# Patient Record
Sex: FEMALE | Race: WHITE | Employment: OTHER | URBAN - NONMETROPOLITAN AREA
[De-identification: names, ages, dates, MRNs, and addresses within clinical notes are randomized per-mention and may not be internally consistent; named-entity substitution may affect disease eponyms.]

---

## 2017-02-06 ENCOUNTER — OFFICE VISIT (OUTPATIENT)
Dept: PHYSICAL MEDICINE AND REHAB | Age: 62
End: 2017-02-06

## 2017-02-06 VITALS
WEIGHT: 187 LBS | DIASTOLIC BLOOD PRESSURE: 85 MMHG | BODY MASS INDEX: 31.92 KG/M2 | HEART RATE: 76 BPM | SYSTOLIC BLOOD PRESSURE: 134 MMHG | HEIGHT: 64 IN

## 2017-02-06 DIAGNOSIS — R41.3 MEMORY PROBLEM: Primary | ICD-10-CM

## 2017-02-06 PROCEDURE — 99213 OFFICE O/P EST LOW 20 MIN: CPT | Performed by: PSYCHIATRY & NEUROLOGY

## 2017-02-06 RX ORDER — CYCLOBENZAPRINE HCL 5 MG
5 TABLET ORAL 2 TIMES DAILY PRN
COMMUNITY
End: 2017-03-07 | Stop reason: ALTCHOICE

## 2017-02-06 RX ORDER — METRONIDAZOLE 500 MG/1
500 TABLET ORAL 2 TIMES DAILY
COMMUNITY
End: 2017-08-10 | Stop reason: ALTCHOICE

## 2017-02-06 RX ORDER — HYDROCHLOROTHIAZIDE 25 MG/1
25 TABLET ORAL 2 TIMES DAILY
Status: ON HOLD | COMMUNITY
End: 2017-11-18 | Stop reason: HOSPADM

## 2017-02-06 RX ORDER — CETIRIZINE HYDROCHLORIDE 10 MG/1
10 TABLET ORAL DAILY
COMMUNITY
End: 2017-05-01

## 2017-02-06 RX ORDER — MELOXICAM 7.5 MG/1
7.5 TABLET ORAL DAILY
COMMUNITY
End: 2017-08-10 | Stop reason: ALTCHOICE

## 2017-02-07 LAB — VITAMIN B-12: 529 PG/ML (ref 211–911)

## 2017-02-09 ENCOUNTER — TELEPHONE (OUTPATIENT)
Dept: PHYSICAL MEDICINE AND REHAB | Age: 62
End: 2017-02-09

## 2017-02-09 LAB — VITAMIN B6: 237 NMOL/L (ref 20–125)

## 2017-03-07 ENCOUNTER — OFFICE VISIT (OUTPATIENT)
Dept: CARDIOLOGY | Age: 62
End: 2017-03-07

## 2017-03-07 VITALS
HEIGHT: 64 IN | SYSTOLIC BLOOD PRESSURE: 138 MMHG | WEIGHT: 182 LBS | HEART RATE: 80 BPM | DIASTOLIC BLOOD PRESSURE: 70 MMHG | BODY MASS INDEX: 31.07 KG/M2

## 2017-03-07 DIAGNOSIS — I10 ESSENTIAL HYPERTENSION: ICD-10-CM

## 2017-03-07 DIAGNOSIS — F17.200 SMOKER: ICD-10-CM

## 2017-03-07 DIAGNOSIS — R07.82 INTERCOSTAL PAIN: Primary | ICD-10-CM

## 2017-03-07 PROCEDURE — 99213 OFFICE O/P EST LOW 20 MIN: CPT | Performed by: INTERNAL MEDICINE

## 2017-03-07 RX ORDER — CHOLECALCIFEROL (VITAMIN D3) 125 MCG
500 CAPSULE ORAL DAILY
Status: ON HOLD | COMMUNITY
End: 2018-02-18

## 2017-03-07 RX ORDER — ATORVASTATIN CALCIUM 40 MG/1
40 TABLET, FILM COATED ORAL NIGHTLY
Status: ON HOLD | COMMUNITY
End: 2018-02-18 | Stop reason: ALTCHOICE

## 2017-03-07 RX ORDER — DOXEPIN HYDROCHLORIDE 100 MG/1
100 CAPSULE ORAL NIGHTLY
Status: ON HOLD | COMMUNITY
End: 2018-01-12 | Stop reason: ALTCHOICE

## 2017-03-07 RX ORDER — ISOSORBIDE DINITRATE 10 MG/1
10 TABLET ORAL 3 TIMES DAILY
Qty: 90 TABLET | Refills: 3 | Status: SHIPPED | OUTPATIENT
Start: 2017-03-07 | End: 2017-08-10 | Stop reason: SDUPTHER

## 2017-03-07 RX ORDER — FERROUS SULFATE 325(65) MG
325 TABLET ORAL
Status: ON HOLD | COMMUNITY
End: 2019-05-14 | Stop reason: ALTCHOICE

## 2017-03-07 RX ORDER — ISOSORBIDE DINITRATE 10 MG/1
10 TABLET ORAL 3 TIMES DAILY
COMMUNITY
End: 2017-03-07 | Stop reason: SDUPTHER

## 2017-05-01 ENCOUNTER — OFFICE VISIT (OUTPATIENT)
Dept: INTERNAL MEDICINE | Age: 62
End: 2017-05-01

## 2017-05-01 VITALS
BODY MASS INDEX: 29.71 KG/M2 | WEIGHT: 174 LBS | SYSTOLIC BLOOD PRESSURE: 114 MMHG | DIASTOLIC BLOOD PRESSURE: 70 MMHG | HEIGHT: 64 IN | HEART RATE: 76 BPM

## 2017-05-01 DIAGNOSIS — F17.200 TOBACCO USE DISORDER: ICD-10-CM

## 2017-05-01 DIAGNOSIS — I10 HTN (HYPERTENSION), BENIGN: ICD-10-CM

## 2017-05-01 DIAGNOSIS — M23.91 INTERNAL DERANGEMENT OF RIGHT KNEE: ICD-10-CM

## 2017-05-01 DIAGNOSIS — I51.89 DIASTOLIC DYSFUNCTION: ICD-10-CM

## 2017-05-01 DIAGNOSIS — J44.9 CHRONIC OBSTRUCTIVE PULMONARY DISEASE, UNSPECIFIED COPD TYPE (HCC): ICD-10-CM

## 2017-05-01 DIAGNOSIS — F14.10 COCAINE ABUSE (HCC): ICD-10-CM

## 2017-05-01 DIAGNOSIS — Z86.718 HISTORY OF DVT (DEEP VEIN THROMBOSIS): ICD-10-CM

## 2017-05-01 DIAGNOSIS — F12.10 CANNABIS ABUSE: ICD-10-CM

## 2017-05-01 DIAGNOSIS — E03.9 ACQUIRED HYPOTHYROIDISM: ICD-10-CM

## 2017-05-01 DIAGNOSIS — Z01.818 PREOP EXAM FOR INTERNAL MEDICINE: Primary | ICD-10-CM

## 2017-05-01 DIAGNOSIS — E87.6 HYPOKALEMIA: ICD-10-CM

## 2017-05-01 DIAGNOSIS — E16.2 HYPOGLYCEMIA: ICD-10-CM

## 2017-05-01 PROCEDURE — 99244 OFF/OP CNSLTJ NEW/EST MOD 40: CPT | Performed by: INTERNAL MEDICINE

## 2017-05-01 RX ORDER — TRIAMCINOLONE ACETONIDE 1 MG/ML
LOTION TOPICAL PRN
COMMUNITY
End: 2017-09-05 | Stop reason: ALTCHOICE

## 2017-05-01 RX ORDER — ACLIDINIUM BROMIDE 400 UG/1
1 POWDER, METERED RESPIRATORY (INHALATION) 2 TIMES DAILY PRN
Refills: 0 | COMMUNITY
Start: 2017-04-14 | End: 2019-08-08 | Stop reason: SDUPTHER

## 2017-05-01 RX ORDER — LISINOPRIL 2.5 MG/1
2.5 TABLET ORAL DAILY
Status: ON HOLD | COMMUNITY
End: 2019-03-19 | Stop reason: HOSPADM

## 2017-05-01 RX ORDER — POTASSIUM CHLORIDE 20 MEQ/1
20 TABLET, EXTENDED RELEASE ORAL DAILY
Qty: 30 TABLET | Refills: 0 | Status: ON HOLD | OUTPATIENT
Start: 2017-05-01 | End: 2017-11-18 | Stop reason: HOSPADM

## 2017-05-01 RX ORDER — FLUTICASONE PROPIONATE 50 MCG
1 SPRAY, SUSPENSION (ML) NASAL DAILY
COMMUNITY
End: 2018-07-23 | Stop reason: SDUPTHER

## 2017-05-10 ENCOUNTER — TELEPHONE (OUTPATIENT)
Dept: INTERNAL MEDICINE | Age: 62
End: 2017-05-10

## 2017-05-16 ENCOUNTER — TELEPHONE (OUTPATIENT)
Dept: INTERNAL MEDICINE | Age: 62
End: 2017-05-16

## 2017-08-10 ENCOUNTER — OFFICE VISIT (OUTPATIENT)
Dept: CARDIOLOGY CLINIC | Age: 62
End: 2017-08-10
Payer: COMMERCIAL

## 2017-08-10 VITALS
DIASTOLIC BLOOD PRESSURE: 66 MMHG | BODY MASS INDEX: 28.48 KG/M2 | HEART RATE: 68 BPM | HEIGHT: 64 IN | SYSTOLIC BLOOD PRESSURE: 108 MMHG | WEIGHT: 166.8 LBS

## 2017-08-10 DIAGNOSIS — R07.2 PRECORDIAL PAIN: ICD-10-CM

## 2017-08-10 DIAGNOSIS — I51.89 DIASTOLIC DYSFUNCTION: Primary | ICD-10-CM

## 2017-08-10 PROCEDURE — 99213 OFFICE O/P EST LOW 20 MIN: CPT | Performed by: INTERNAL MEDICINE

## 2017-08-10 RX ORDER — SUCRALFATE 1 G/1
1 TABLET ORAL 4 TIMES DAILY
Status: ON HOLD | COMMUNITY
End: 2018-01-12

## 2017-08-10 RX ORDER — LOPERAMIDE HYDROCHLORIDE 2 MG/1
2 TABLET ORAL PRN
Status: ON HOLD | COMMUNITY
End: 2018-02-18 | Stop reason: ALTCHOICE

## 2017-08-10 RX ORDER — ESCITALOPRAM OXALATE 10 MG/1
10 TABLET ORAL DAILY
COMMUNITY
End: 2017-09-05 | Stop reason: ALTCHOICE

## 2017-08-10 RX ORDER — ISOSORBIDE DINITRATE 10 MG/1
10 TABLET ORAL 3 TIMES DAILY
Qty: 90 TABLET | Refills: 3 | Status: SHIPPED | OUTPATIENT
Start: 2017-08-10 | End: 2017-12-07 | Stop reason: SDUPTHER

## 2017-08-14 ENCOUNTER — TELEPHONE (OUTPATIENT)
Dept: CARDIOLOGY CLINIC | Age: 62
End: 2017-08-14

## 2017-08-14 DIAGNOSIS — R01.1 SYSTOLIC MURMUR: Primary | ICD-10-CM

## 2017-08-23 ENCOUNTER — TELEPHONE (OUTPATIENT)
Dept: ADMINISTRATIVE | Age: 62
End: 2017-08-23

## 2017-09-05 ENCOUNTER — PREP FOR PROCEDURE (OUTPATIENT)
Dept: CARDIOLOGY | Age: 62
End: 2017-09-05

## 2017-09-05 RX ORDER — CEPHALEXIN 500 MG/1
500 CAPSULE ORAL 2 TIMES DAILY
COMMUNITY
End: 2017-11-13 | Stop reason: ALTCHOICE

## 2017-09-05 RX ORDER — DIAZEPAM 5 MG/1
5 TABLET ORAL
Status: CANCELLED | OUTPATIENT
Start: 2017-09-05 | End: 2017-09-05

## 2017-09-05 RX ORDER — SODIUM CHLORIDE 0.9 % (FLUSH) 0.9 %
10 SYRINGE (ML) INJECTION EVERY 12 HOURS SCHEDULED
Status: CANCELLED | OUTPATIENT
Start: 2017-09-05

## 2017-09-05 RX ORDER — SODIUM CHLORIDE 9 MG/ML
INJECTION, SOLUTION INTRAVENOUS CONTINUOUS
Status: CANCELLED | OUTPATIENT
Start: 2017-09-05

## 2017-09-05 RX ORDER — SODIUM CHLORIDE 0.9 % (FLUSH) 0.9 %
10 SYRINGE (ML) INJECTION PRN
Status: CANCELLED | OUTPATIENT
Start: 2017-09-05

## 2017-09-05 RX ORDER — ASPIRIN 325 MG
325 TABLET ORAL ONCE
Status: ON HOLD | COMMUNITY
Start: 2017-09-06 | End: 2017-09-06

## 2017-09-05 RX ORDER — METRONIDAZOLE 250 MG/1
250 TABLET ORAL 3 TIMES DAILY
COMMUNITY
End: 2017-11-13 | Stop reason: ALTCHOICE

## 2017-09-06 ENCOUNTER — TELEPHONE (OUTPATIENT)
Dept: CARDIOLOGY CLINIC | Age: 62
End: 2017-09-06

## 2017-09-06 ENCOUNTER — APPOINTMENT (OUTPATIENT)
Dept: CARDIAC CATH/INVASIVE PROCEDURES | Age: 62
End: 2017-09-06
Attending: INTERNAL MEDICINE
Payer: COMMERCIAL

## 2017-09-06 ENCOUNTER — HOSPITAL ENCOUNTER (OUTPATIENT)
Dept: INPATIENT UNIT | Age: 62
Discharge: HOME | End: 2017-09-06
Attending: INTERNAL MEDICINE | Admitting: INTERNAL MEDICINE
Payer: COMMERCIAL

## 2017-09-06 VITALS
BODY MASS INDEX: 27.66 KG/M2 | WEIGHT: 162 LBS | OXYGEN SATURATION: 96 % | HEART RATE: 76 BPM | SYSTOLIC BLOOD PRESSURE: 110 MMHG | TEMPERATURE: 97.9 F | RESPIRATION RATE: 16 BRPM | DIASTOLIC BLOOD PRESSURE: 88 MMHG | HEIGHT: 64 IN

## 2017-09-06 LAB
ABO: NORMAL
ANION GAP SERPL CALCULATED.3IONS-SCNC: 14 MEQ/L (ref 8–16)
ANTIBODY SCREEN: NORMAL
BUN BLDV-MCNC: 7 MG/DL (ref 7–22)
CALCIUM SERPL-MCNC: 10 MG/DL (ref 8.5–10.5)
CHLORIDE BLD-SCNC: 98 MEQ/L (ref 98–111)
CHOLESTEROL, TOTAL: 217 MG/DL (ref 100–199)
CO2: 27 MEQ/L (ref 23–33)
CREAT SERPL-MCNC: 0.6 MG/DL (ref 0.4–1.2)
GFR SERPL CREATININE-BSD FRML MDRD: > 90 ML/MIN/1.73M2
GLUCOSE BLD-MCNC: 118 MG/DL (ref 70–108)
HCT VFR BLD CALC: 41.4 % (ref 37–47)
HDLC SERPL-MCNC: 35 MG/DL
HEMOGLOBIN: 13.8 GM/DL (ref 12–16)
LDL CHOLESTEROL CALCULATED: ABNORMAL MG/DL
MCH RBC QN AUTO: 25.3 PG (ref 27–31)
MCHC RBC AUTO-ENTMCNC: 33.4 GM/DL (ref 33–37)
MCV RBC AUTO: 75.6 FL (ref 81–99)
PDW BLD-RTO: 16.1 % (ref 11.5–14.5)
PLATELET # BLD: 280 THOU/MM3 (ref 130–400)
PMV BLD AUTO: 8.3 MCM (ref 7.4–10.4)
POTASSIUM SERPL-SCNC: 3.4 MEQ/L (ref 3.5–5.2)
RBC # BLD: 5.48 MILL/MM3 (ref 4.2–5.4)
RH FACTOR: NORMAL
SODIUM BLD-SCNC: 139 MEQ/L (ref 135–145)
TRIGL SERPL-MCNC: 670 MG/DL (ref 0–199)
WBC # BLD: 8.1 THOU/MM3 (ref 4.8–10.8)

## 2017-09-06 PROCEDURE — A6258 TRANSPARENT FILM >16<=48 IN: HCPCS

## 2017-09-06 PROCEDURE — 6370000000 HC RX 637 (ALT 250 FOR IP)

## 2017-09-06 PROCEDURE — 80061 LIPID PANEL: CPT

## 2017-09-06 PROCEDURE — 93458 L HRT ARTERY/VENTRICLE ANGIO: CPT | Performed by: INTERNAL MEDICINE

## 2017-09-06 PROCEDURE — 2780000010 HC IMPLANT OTHER

## 2017-09-06 PROCEDURE — C1894 INTRO/SHEATH, NON-LASER: HCPCS

## 2017-09-06 PROCEDURE — 2720000010 HC SURG SUPPLY STERILE

## 2017-09-06 PROCEDURE — 86901 BLOOD TYPING SEROLOGIC RH(D): CPT

## 2017-09-06 PROCEDURE — 86900 BLOOD TYPING SEROLOGIC ABO: CPT

## 2017-09-06 PROCEDURE — 93005 ELECTROCARDIOGRAM TRACING: CPT

## 2017-09-06 PROCEDURE — 2580000003 HC RX 258: Performed by: PHYSICIAN ASSISTANT

## 2017-09-06 PROCEDURE — 86850 RBC ANTIBODY SCREEN: CPT

## 2017-09-06 PROCEDURE — 6370000000 HC RX 637 (ALT 250 FOR IP): Performed by: INTERNAL MEDICINE

## 2017-09-06 PROCEDURE — 85027 COMPLETE CBC AUTOMATED: CPT

## 2017-09-06 PROCEDURE — C1725 CATH, TRANSLUMIN NON-LASER: HCPCS

## 2017-09-06 PROCEDURE — 80048 BASIC METABOLIC PNL TOTAL CA: CPT

## 2017-09-06 PROCEDURE — 2500000003 HC RX 250 WO HCPCS

## 2017-09-06 PROCEDURE — 36415 COLL VENOUS BLD VENIPUNCTURE: CPT

## 2017-09-06 PROCEDURE — C1769 GUIDE WIRE: HCPCS

## 2017-09-06 PROCEDURE — 6360000002 HC RX W HCPCS

## 2017-09-06 RX ORDER — SODIUM CHLORIDE 0.9 % (FLUSH) 0.9 %
10 SYRINGE (ML) INJECTION EVERY 12 HOURS SCHEDULED
Status: DISCONTINUED | OUTPATIENT
Start: 2017-09-06 | End: 2017-09-06 | Stop reason: SDUPTHER

## 2017-09-06 RX ORDER — SODIUM CHLORIDE 0.9 % (FLUSH) 0.9 %
10 SYRINGE (ML) INJECTION PRN
Status: DISCONTINUED | OUTPATIENT
Start: 2017-09-06 | End: 2017-09-06 | Stop reason: SDUPTHER

## 2017-09-06 RX ORDER — MORPHINE SULFATE 2 MG/ML
2 INJECTION, SOLUTION INTRAMUSCULAR; INTRAVENOUS
Status: DISCONTINUED | OUTPATIENT
Start: 2017-09-06 | End: 2017-09-06 | Stop reason: HOSPADM

## 2017-09-06 RX ORDER — SODIUM CHLORIDE 0.9 % (FLUSH) 0.9 %
10 SYRINGE (ML) INJECTION EVERY 12 HOURS SCHEDULED
Status: DISCONTINUED | OUTPATIENT
Start: 2017-09-06 | End: 2017-09-06 | Stop reason: HOSPADM

## 2017-09-06 RX ORDER — SODIUM CHLORIDE 9 MG/ML
INJECTION, SOLUTION INTRAVENOUS CONTINUOUS
Status: DISCONTINUED | OUTPATIENT
Start: 2017-09-06 | End: 2017-09-06 | Stop reason: HOSPADM

## 2017-09-06 RX ORDER — DIAZEPAM 5 MG/1
5 TABLET ORAL
Status: DISCONTINUED | OUTPATIENT
Start: 2017-09-06 | End: 2017-09-06 | Stop reason: HOSPADM

## 2017-09-06 RX ORDER — ACETAMINOPHEN 325 MG/1
650 TABLET ORAL EVERY 4 HOURS PRN
Status: DISCONTINUED | OUTPATIENT
Start: 2017-09-06 | End: 2017-09-06 | Stop reason: HOSPADM

## 2017-09-06 RX ORDER — ONDANSETRON 2 MG/ML
4 INJECTION INTRAMUSCULAR; INTRAVENOUS EVERY 6 HOURS PRN
Status: DISCONTINUED | OUTPATIENT
Start: 2017-09-06 | End: 2017-09-06 | Stop reason: HOSPADM

## 2017-09-06 RX ORDER — SODIUM CHLORIDE 0.9 % (FLUSH) 0.9 %
10 SYRINGE (ML) INJECTION PRN
Status: DISCONTINUED | OUTPATIENT
Start: 2017-09-06 | End: 2017-09-06 | Stop reason: HOSPADM

## 2017-09-06 RX ORDER — CLOPIDOGREL BISULFATE 75 MG/1
75 TABLET ORAL DAILY
Qty: 30 TABLET | Refills: 3 | Status: SHIPPED | OUTPATIENT
Start: 2017-09-06 | End: 2017-12-13 | Stop reason: SDUPTHER

## 2017-09-06 RX ORDER — ATROPINE SULFATE 0.4 MG/ML
0.5 AMPUL (ML) INJECTION
Status: DISCONTINUED | OUTPATIENT
Start: 2017-09-06 | End: 2017-09-06 | Stop reason: HOSPADM

## 2017-09-06 RX ORDER — POTASSIUM CHLORIDE 20MEQ/15ML
40 LIQUID (ML) ORAL ONCE
Status: COMPLETED | OUTPATIENT
Start: 2017-09-06 | End: 2017-09-06

## 2017-09-06 RX ADMIN — POTASSIUM CHLORIDE 40 MEQ: 1.5 SOLUTION ORAL at 09:02

## 2017-09-06 RX ADMIN — SODIUM CHLORIDE: 9 INJECTION, SOLUTION INTRAVENOUS at 07:35

## 2017-09-07 LAB
EKG ATRIAL RATE: 66 BPM
EKG P AXIS: 46 DEGREES
EKG P-R INTERVAL: 138 MS
EKG Q-T INTERVAL: 452 MS
EKG QRS DURATION: 84 MS
EKG QTC CALCULATION (BAZETT): 473 MS
EKG R AXIS: -3 DEGREES
EKG T AXIS: 87 DEGREES
EKG VENTRICULAR RATE: 66 BPM

## 2017-09-11 ENCOUNTER — OFFICE VISIT (OUTPATIENT)
Dept: NEUROLOGY | Age: 62
End: 2017-09-11
Payer: COMMERCIAL

## 2017-09-11 ENCOUNTER — HOSPITAL ENCOUNTER (OUTPATIENT)
Age: 62
Discharge: HOME | End: 2017-09-11
Payer: COMMERCIAL

## 2017-09-11 VITALS
DIASTOLIC BLOOD PRESSURE: 75 MMHG | WEIGHT: 164 LBS | HEIGHT: 64 IN | SYSTOLIC BLOOD PRESSURE: 120 MMHG | BODY MASS INDEX: 28 KG/M2 | HEART RATE: 81 BPM

## 2017-09-11 DIAGNOSIS — R41.3 MEMORY PROBLEM: Primary | ICD-10-CM

## 2017-09-11 DIAGNOSIS — R41.3 MEMORY PROBLEM: ICD-10-CM

## 2017-09-11 LAB
ANION GAP SERPL CALCULATED.3IONS-SCNC: 16 MEQ/L (ref 8–16)
BUN BLDV-MCNC: 10 MG/DL (ref 7–22)
CALCIUM SERPL-MCNC: 10.8 MG/DL (ref 8.5–10.5)
CHLORIDE BLD-SCNC: 94 MEQ/L (ref 98–111)
CHOLESTEROL, TOTAL: 270 MG/DL (ref 100–199)
CO2: 28 MEQ/L (ref 23–33)
CREAT SERPL-MCNC: 0.6 MG/DL (ref 0.4–1.2)
GFR SERPL CREATININE-BSD FRML MDRD: > 90 ML/MIN/1.73M2
GLUCOSE BLD-MCNC: 112 MG/DL (ref 70–108)
HDLC SERPL-MCNC: 40 MG/DL
LDL CHOLESTEROL CALCULATED: ABNORMAL MG/DL
POTASSIUM SERPL-SCNC: 4.2 MEQ/L (ref 3.5–5.2)
SODIUM BLD-SCNC: 138 MEQ/L (ref 135–145)
TRIGL SERPL-MCNC: 591 MG/DL (ref 0–199)
TSH SERPL DL<=0.05 MIU/L-ACNC: 2.07 UIU/ML (ref 0.4–4.2)
VITAMIN B-12: 667 PG/ML (ref 211–911)

## 2017-09-11 PROCEDURE — 36415 COLL VENOUS BLD VENIPUNCTURE: CPT

## 2017-09-11 PROCEDURE — 99213 OFFICE O/P EST LOW 20 MIN: CPT | Performed by: PSYCHIATRY & NEUROLOGY

## 2017-09-11 PROCEDURE — 80048 BASIC METABOLIC PNL TOTAL CA: CPT

## 2017-09-11 PROCEDURE — 80061 LIPID PANEL: CPT

## 2017-09-11 PROCEDURE — 82607 VITAMIN B-12: CPT

## 2017-09-11 PROCEDURE — 84443 ASSAY THYROID STIM HORMONE: CPT

## 2017-09-26 ENCOUNTER — TELEPHONE (OUTPATIENT)
Dept: CARDIOLOGY CLINIC | Age: 62
End: 2017-09-26

## 2017-11-13 ENCOUNTER — OFFICE VISIT (OUTPATIENT)
Dept: CARDIOLOGY CLINIC | Age: 62
End: 2017-11-13
Payer: COMMERCIAL

## 2017-11-13 VITALS
HEART RATE: 66 BPM | BODY MASS INDEX: 28.17 KG/M2 | WEIGHT: 165 LBS | SYSTOLIC BLOOD PRESSURE: 122 MMHG | DIASTOLIC BLOOD PRESSURE: 74 MMHG | HEIGHT: 64 IN

## 2017-11-13 DIAGNOSIS — I10 ESSENTIAL HYPERTENSION: ICD-10-CM

## 2017-11-13 DIAGNOSIS — I25.10 CORONARY ARTERY DISEASE INVOLVING NATIVE CORONARY ARTERY OF NATIVE HEART WITHOUT ANGINA PECTORIS: Primary | ICD-10-CM

## 2017-11-13 DIAGNOSIS — F17.200 TOBACCO USE DISORDER: ICD-10-CM

## 2017-11-13 DIAGNOSIS — E78.5 DYSLIPIDEMIA: ICD-10-CM

## 2017-11-13 PROCEDURE — G8598 ASA/ANTIPLAT THER USED: HCPCS | Performed by: PHYSICIAN ASSISTANT

## 2017-11-13 PROCEDURE — 4004F PT TOBACCO SCREEN RCVD TLK: CPT | Performed by: PHYSICIAN ASSISTANT

## 2017-11-13 PROCEDURE — G8427 DOCREV CUR MEDS BY ELIG CLIN: HCPCS | Performed by: PHYSICIAN ASSISTANT

## 2017-11-13 PROCEDURE — 3017F COLORECTAL CA SCREEN DOC REV: CPT | Performed by: PHYSICIAN ASSISTANT

## 2017-11-13 PROCEDURE — 99213 OFFICE O/P EST LOW 20 MIN: CPT | Performed by: PHYSICIAN ASSISTANT

## 2017-11-13 PROCEDURE — 3014F SCREEN MAMMO DOC REV: CPT | Performed by: PHYSICIAN ASSISTANT

## 2017-11-13 PROCEDURE — G8484 FLU IMMUNIZE NO ADMIN: HCPCS | Performed by: PHYSICIAN ASSISTANT

## 2017-11-13 PROCEDURE — G8419 CALC BMI OUT NRM PARAM NOF/U: HCPCS | Performed by: PHYSICIAN ASSISTANT

## 2017-11-13 RX ORDER — ESCITALOPRAM OXALATE 10 MG/1
10 TABLET ORAL DAILY
Status: ON HOLD | COMMUNITY
End: 2017-11-17 | Stop reason: DRUGHIGH

## 2017-11-13 RX ORDER — NYSTATIN 100000 [USP'U]/G
POWDER TOPICAL 4 TIMES DAILY PRN
COMMUNITY
End: 2018-07-23

## 2017-11-13 NOTE — PROGRESS NOTES
daily      nystatin (NYSTATIN) 123768 UNIT/GM powder Apply topically daily as needed Apply topically 4 times daily.  clopidogrel (PLAVIX) 75 MG tablet Take 1 tablet by mouth daily 30 tablet 3    loperamide (LOPERAMIDE A-D) 2 MG tablet Take 2 mg by mouth daily      sucralfate (CARAFATE) 1 GM tablet Take 1 g by mouth 4 times daily      isosorbide dinitrate (ISORDIL) 10 MG tablet Take 1 tablet by mouth 3 times daily 90 tablet 3    TUDORZA PRESSAIR 400 MCG/ACT AEPB inhaler 1 puff 2 times daily   0    lisinopril (PRINIVIL;ZESTRIL) 2.5 MG tablet Take 2.5 mg by mouth daily      fluticasone (FLONASE) 50 MCG/ACT nasal spray 1 spray by Nasal route daily      potassium chloride (KLOR-CON M) 20 MEQ extended release tablet Take 1 tablet by mouth daily (Patient taking differently: Take 99 mEq by mouth daily ) 30 tablet 0    ferrous sulfate 325 (65 FE) MG tablet Take 325 mg by mouth every other day       vitamin B-12 (CYANOCOBALAMIN) 500 MCG tablet Take 500 mcg by mouth daily      doxepin (SINEQUAN) 50 MG capsule Take 100 mg by mouth nightly       atorvastatin (LIPITOR) 20 MG tablet Take 40 mg by mouth daily       hydrochlorothiazide (HYDRODIURIL) 25 MG tablet Take 25 mg by mouth 2 times daily      metFORMIN (GLUCOPHAGE) 500 MG tablet Take 500 mg by mouth 2 times daily (with meals)       QUEtiapine (SEROQUEL XR) 400 MG extended release tablet Take 25 mg by mouth nightly       levothyroxine (SYNTHROID) 75 MCG tablet Take 75 mcg by mouth Daily       hydrOXYzine (ATARAX) 50 MG tablet Take 10 mg by mouth 3 times daily as needed for Itching or Anxiety       pantoprazole (PROTONIX) 40 MG tablet Take 1 tablet by mouth daily Indications: Gastroesophageal Reflux Disease 10 tablet 0    albuterol (PROVENTIL HFA;VENTOLIN HFA) 108 (90 BASE) MCG/ACT inhaler Inhale 2 puffs into the lungs every 6 hours as needed for Wheezing. No current facility-administered medications for this visit.         Social History Social History    Marital status:      Spouse name: N/A    Number of children: 3    Years of education: 12     Occupational History    on disability      [employer]     Social History Main Topics    Smoking status: Current Every Day Smoker     Packs/day: 1.00     Years: 30.00     Types: Cigarettes     Start date: 4/22/1977    Smokeless tobacco: Never Used    Alcohol use No      Comment: none for 2 years    Drug use:      Frequency: 1.0 time per week     Types: Cocaine      Comment: last August 2017    Sexual activity: No     Other Topics Concern    None     Social History Narrative    None       Family History   Problem Relation Age of Onset    Cancer Mother     Heart Disease Mother     High Blood Pressure Mother     High Cholesterol Mother     Cancer Father      brain    Depression Father     Heart Disease Father     High Cholesterol Father     Mental Illness Father     Cancer Sister     Heart Attack Sister     Heart Disease Maternal Uncle     High Cholesterol Maternal Uncle     Diabetes Maternal Grandmother     Heart Disease Maternal Grandmother     High Cholesterol Maternal Grandmother     Early Death Maternal Grandfather     Heart Disease Maternal Grandfather     High Cholesterol Maternal Grandfather     Stroke Maternal Grandfather     Heart Disease Paternal Grandmother     High Cholesterol Paternal Grandmother     Heart Disease Paternal Grandfather     High Cholesterol Paternal Grandfather        Blood pressure 122/74, pulse 66, height 5' 4\" (1.626 m), weight 165 lb (74.8 kg). General:   Well developed, well nourished  Lungs:   Clear to auscultation  Heart:    Normal S1 S2, No murmur, rubs, or gallops  Abdomen:   Soft, non tender, no organomegalies, positive bowel sounds  Extremities:   No edema, no cyanosis, good peripheral pulses  Neurological:   Awake, alert, oriented.  No obvious focal deficits  Musculoskeletal:  No obvious deformities        Cardiac catheterization 9/6/2017  CORONARY ANGIOGRAPHY:  The RCA is a dominant vessel, which gives rise  to PDA and PLV, which are both large caliber vessels. The PDA is a  large vessel without occlusive disease. PLV is a large caliber vessel  without occlusive disease. The RCA has about 50% mid-lesion.     The left main is a long large caliber vessel, which gives rise to  circumflex and LAD.     The circumflex is a large vessel and it has 25% mid-lesion.     LAD:  LAD is a large vessel which wraps around the apex, and I do  believe the LAD in the very proximal portion has about 25% lesion.     CONCLUSION:  1. Left ventriculography ejection fraction 60%. No hemodynamics. No  pullback. LVEDP moderately impaired relaxation of ventricle. 2.  Left main is a large vessel, widely patent. 3.  Circumflex is a large vessel with about 25% mid-lesion. 4. LAD is large vessel with about 25% very proximal lesion. 5.  RCA is a large vessel with about 50% lesion. No complication  occurred. Continue with aggressive risk factor modification. 1. Coronary artery disease involving native coronary artery of native heart without angina pectoris     2. Dyslipidemia     3. Essential hypertension     4. Tobacco use disorder         Continue Dr Maty Mosquera current treatment plan    Discussed smoking cessation in this symptomatic patient for 5 minutes    Continue same current medications and with constant vigilance to changes in symptoms and also any potential side effects. Return for care if become worse or seek medical attention immediately. The patient is educated on symptoms of heart disease that include chest pain and passing out, dizziness, etc and to report them if there is any change or go to emergency room.         Follow up with Dr Deangelo Chandler as scheduled or sooner if needed  (Please note that portions of this note were completed with a voice recognition program.  Efforts were made to edit the dictation but

## 2017-11-16 ENCOUNTER — HOSPITAL ENCOUNTER (INPATIENT)
Age: 62
LOS: 1 days | Discharge: ANOTHER HOSPITAL | DRG: 191 | End: 2017-11-18
Attending: ANESTHESIOLOGY | Admitting: ANESTHESIOLOGY
Payer: COMMERCIAL

## 2017-11-16 ENCOUNTER — APPOINTMENT (OUTPATIENT)
Dept: GENERAL RADIOLOGY | Age: 62
DRG: 191 | End: 2017-11-16
Payer: COMMERCIAL

## 2017-11-16 ENCOUNTER — APPOINTMENT (OUTPATIENT)
Dept: CT IMAGING | Age: 62
DRG: 191 | End: 2017-11-16
Payer: COMMERCIAL

## 2017-11-16 DIAGNOSIS — F14.10 NONDEPENDENT COCAINE ABUSE (HCC): ICD-10-CM

## 2017-11-16 DIAGNOSIS — F11.10 OPIOID ABUSE (HCC): ICD-10-CM

## 2017-11-16 DIAGNOSIS — R07.89 OTHER CHEST PAIN: ICD-10-CM

## 2017-11-16 DIAGNOSIS — R07.9 CHEST PAIN, UNSPECIFIED TYPE: Primary | ICD-10-CM

## 2017-11-16 DIAGNOSIS — R06.02 SOB (SHORTNESS OF BREATH): ICD-10-CM

## 2017-11-16 LAB
ALBUMIN SERPL-MCNC: 4.4 G/DL (ref 3.5–5.1)
ALP BLD-CCNC: 87 U/L (ref 38–126)
ALT SERPL-CCNC: 29 U/L (ref 11–66)
AMPHETAMINE+METHAMPHETAMINE URINE SCREEN: NEGATIVE
AMYLASE: 84 U/L (ref 20–104)
ANION GAP SERPL CALCULATED.3IONS-SCNC: 16 MEQ/L (ref 8–16)
ANISOCYTOSIS: ABNORMAL
AST SERPL-CCNC: 26 U/L (ref 5–40)
BARBITURATE QUANTITATIVE URINE: NEGATIVE
BASOPHILS # BLD: 0.9 %
BASOPHILS ABSOLUTE: 0.1 THOU/MM3 (ref 0–0.1)
BENZODIAZEPINE QUANTITATIVE URINE: NEGATIVE
BILIRUB SERPL-MCNC: 0.2 MG/DL (ref 0.3–1.2)
BILIRUBIN DIRECT: < 0.2 MG/DL (ref 0–0.3)
BUN BLDV-MCNC: 8 MG/DL (ref 7–22)
CALCIUM SERPL-MCNC: 9.7 MG/DL (ref 8.5–10.5)
CANNABINOID QUANTITATIVE URINE: NEGATIVE
CHLORIDE BLD-SCNC: 96 MEQ/L (ref 98–111)
CO2: 26 MEQ/L (ref 23–33)
COCAINE METABOLITE QUANTITATIVE URINE: POSITIVE
CREAT SERPL-MCNC: 0.6 MG/DL (ref 0.4–1.2)
EOSINOPHIL # BLD: 0.6 %
EOSINOPHILS ABSOLUTE: 0 THOU/MM3 (ref 0–0.4)
GFR SERPL CREATININE-BSD FRML MDRD: > 90 ML/MIN/1.73M2
GLUCOSE BLD-MCNC: 96 MG/DL (ref 70–108)
HCT VFR BLD CALC: 40 % (ref 37–47)
HEMOGLOBIN: 13.7 GM/DL (ref 12–16)
LACTIC ACID: 2.5 MMOL/L (ref 0.5–2.2)
LIPASE: 53.8 U/L (ref 5.6–51.3)
LYMPHOCYTES # BLD: 46.3 %
LYMPHOCYTES ABSOLUTE: 3.7 THOU/MM3 (ref 1–4.8)
MAGNESIUM: 1.4 MG/DL (ref 1.6–2.4)
MCH RBC QN AUTO: 27.4 PG (ref 27–31)
MCHC RBC AUTO-ENTMCNC: 34.3 GM/DL (ref 33–37)
MCV RBC AUTO: 80 FL (ref 81–99)
MICROCYTES: ABNORMAL
MONOCYTES # BLD: 5.9 %
MONOCYTES ABSOLUTE: 0.5 THOU/MM3 (ref 0.4–1.3)
NUCLEATED RED BLOOD CELLS: 0 /100 WBC
OPIATES, URINE: POSITIVE
OSMOLALITY CALCULATION: 273.9 MOSMOL/KG (ref 275–300)
OXYCODONE: NEGATIVE
PDW BLD-RTO: 16 % (ref 11.5–14.5)
PHENCYCLIDINE QUANTITATIVE URINE: NEGATIVE
PLATELET # BLD: 294 THOU/MM3 (ref 130–400)
PMV BLD AUTO: 7.8 MCM (ref 7.4–10.4)
POTASSIUM SERPL-SCNC: 3.3 MEQ/L (ref 3.5–5.2)
PRO-BNP: 61.5 PG/ML (ref 0–900)
RBC # BLD: 5 MILL/MM3 (ref 4.2–5.4)
SEG NEUTROPHILS: 46.3 %
SEGMENTED NEUTROPHILS ABSOLUTE COUNT: 3.7 THOU/MM3 (ref 1.8–7.7)
SODIUM BLD-SCNC: 138 MEQ/L (ref 135–145)
TOTAL PROTEIN: 6.6 G/DL (ref 6.1–8)
TROPONIN T: < 0.01 NG/ML
TROPONIN T: < 0.01 NG/ML
TSH SERPL DL<=0.05 MIU/L-ACNC: 1.61 UIU/ML (ref 0.4–4.2)
WBC # BLD: 7.9 THOU/MM3 (ref 4.8–10.8)

## 2017-11-16 PROCEDURE — 96361 HYDRATE IV INFUSION ADD-ON: CPT

## 2017-11-16 PROCEDURE — 85025 COMPLETE CBC W/AUTO DIFF WBC: CPT

## 2017-11-16 PROCEDURE — 83605 ASSAY OF LACTIC ACID: CPT

## 2017-11-16 PROCEDURE — 71275 CT ANGIOGRAPHY CHEST: CPT

## 2017-11-16 PROCEDURE — 81001 URINALYSIS AUTO W/SCOPE: CPT

## 2017-11-16 PROCEDURE — B4201ZZ COMPUTERIZED TOMOGRAPHY (CT SCAN) OF ABDOMINAL AORTA USING LOW OSMOLAR CONTRAST: ICD-10-PCS | Performed by: RADIOLOGY

## 2017-11-16 PROCEDURE — B32S1ZZ COMPUTERIZED TOMOGRAPHY (CT SCAN) OF RIGHT PULMONARY ARTERY USING LOW OSMOLAR CONTRAST: ICD-10-PCS | Performed by: RADIOLOGY

## 2017-11-16 PROCEDURE — 6360000002 HC RX W HCPCS: Performed by: PHYSICIAN ASSISTANT

## 2017-11-16 PROCEDURE — 96374 THER/PROPH/DIAG INJ IV PUSH: CPT

## 2017-11-16 PROCEDURE — 82150 ASSAY OF AMYLASE: CPT

## 2017-11-16 PROCEDURE — 6370000000 HC RX 637 (ALT 250 FOR IP): Performed by: PHYSICIAN ASSISTANT

## 2017-11-16 PROCEDURE — 80307 DRUG TEST PRSMV CHEM ANLYZR: CPT

## 2017-11-16 PROCEDURE — 84484 ASSAY OF TROPONIN QUANT: CPT

## 2017-11-16 PROCEDURE — 82248 BILIRUBIN DIRECT: CPT

## 2017-11-16 PROCEDURE — 83880 ASSAY OF NATRIURETIC PEPTIDE: CPT

## 2017-11-16 PROCEDURE — 84443 ASSAY THYROID STIM HORMONE: CPT

## 2017-11-16 PROCEDURE — 83735 ASSAY OF MAGNESIUM: CPT

## 2017-11-16 PROCEDURE — 74174 CTA ABD&PLVS W/CONTRAST: CPT

## 2017-11-16 PROCEDURE — 36415 COLL VENOUS BLD VENIPUNCTURE: CPT

## 2017-11-16 PROCEDURE — 93005 ELECTROCARDIOGRAM TRACING: CPT

## 2017-11-16 PROCEDURE — 83690 ASSAY OF LIPASE: CPT

## 2017-11-16 PROCEDURE — 2580000003 HC RX 258: Performed by: PHYSICIAN ASSISTANT

## 2017-11-16 PROCEDURE — 6360000004 HC RX CONTRAST MEDICATION: Performed by: PHYSICIAN ASSISTANT

## 2017-11-16 PROCEDURE — 87086 URINE CULTURE/COLONY COUNT: CPT

## 2017-11-16 PROCEDURE — 80053 COMPREHEN METABOLIC PANEL: CPT

## 2017-11-16 PROCEDURE — B32T1ZZ COMPUTERIZED TOMOGRAPHY (CT SCAN) OF LEFT PULMONARY ARTERY USING LOW OSMOLAR CONTRAST: ICD-10-PCS | Performed by: RADIOLOGY

## 2017-11-16 PROCEDURE — 71020 XR CHEST STANDARD TWO VW: CPT

## 2017-11-16 PROCEDURE — 99285 EMERGENCY DEPT VISIT HI MDM: CPT

## 2017-11-16 RX ORDER — ASPIRIN 81 MG/1
324 TABLET, CHEWABLE ORAL ONCE
Status: COMPLETED | OUTPATIENT
Start: 2017-11-16 | End: 2017-11-16

## 2017-11-16 RX ORDER — NITROGLYCERIN 0.4 MG/1
0.4 TABLET SUBLINGUAL EVERY 5 MIN PRN
Status: DISCONTINUED | OUTPATIENT
Start: 2017-11-16 | End: 2017-11-17 | Stop reason: SDUPTHER

## 2017-11-16 RX ORDER — MORPHINE SULFATE 2 MG/ML
2 INJECTION, SOLUTION INTRAMUSCULAR; INTRAVENOUS ONCE
Status: COMPLETED | OUTPATIENT
Start: 2017-11-16 | End: 2017-11-16

## 2017-11-16 RX ORDER — 0.9 % SODIUM CHLORIDE 0.9 %
1000 INTRAVENOUS SOLUTION INTRAVENOUS ONCE
Status: COMPLETED | OUTPATIENT
Start: 2017-11-16 | End: 2017-11-17

## 2017-11-16 RX ADMIN — SODIUM CHLORIDE 1000 ML: 9 INJECTION, SOLUTION INTRAVENOUS at 22:28

## 2017-11-16 RX ADMIN — NITROGLYCERIN 0.4 MG: 0.4 TABLET SUBLINGUAL at 22:28

## 2017-11-16 RX ADMIN — IOPAMIDOL 80 ML: 755 INJECTION, SOLUTION INTRAVENOUS at 22:49

## 2017-11-16 RX ADMIN — MORPHINE SULFATE 2 MG: 2 INJECTION, SOLUTION INTRAMUSCULAR; INTRAVENOUS at 22:28

## 2017-11-16 RX ADMIN — ASPIRIN 81 MG 324 MG: 81 TABLET ORAL at 22:28

## 2017-11-16 RX ADMIN — NITROGLYCERIN 0.4 MG: 0.4 TABLET SUBLINGUAL at 23:09

## 2017-11-16 ASSESSMENT — ENCOUNTER SYMPTOMS
EYE REDNESS: 0
COLOR CHANGE: 0
COUGH: 0
RHINORRHEA: 0
CONSTIPATION: 0
ABDOMINAL PAIN: 1
NAUSEA: 0
SHORTNESS OF BREATH: 1
EYE DISCHARGE: 0
WHEEZING: 0
SORE THROAT: 0
BACK PAIN: 1
DIARRHEA: 0
VOMITING: 0

## 2017-11-16 ASSESSMENT — PAIN DESCRIPTION - DESCRIPTORS: DESCRIPTORS: PRESSURE

## 2017-11-16 ASSESSMENT — PAIN DESCRIPTION - FREQUENCY: FREQUENCY: CONTINUOUS

## 2017-11-16 ASSESSMENT — PAIN DESCRIPTION - LOCATION: LOCATION: CHEST

## 2017-11-16 ASSESSMENT — PAIN SCALES - GENERAL
PAINLEVEL_OUTOF10: 7

## 2017-11-16 ASSESSMENT — PAIN DESCRIPTION - PAIN TYPE
TYPE: ACUTE PAIN
TYPE: ACUTE PAIN

## 2017-11-16 ASSESSMENT — HEART SCORE: ECG: 1

## 2017-11-16 ASSESSMENT — PAIN DESCRIPTION - ORIENTATION: ORIENTATION: MID;LEFT

## 2017-11-17 ENCOUNTER — APPOINTMENT (OUTPATIENT)
Dept: CARDIAC CATH/INVASIVE PROCEDURES | Age: 62
DRG: 191 | End: 2017-11-17
Payer: COMMERCIAL

## 2017-11-17 ENCOUNTER — APPOINTMENT (OUTPATIENT)
Dept: CT IMAGING | Age: 62
DRG: 191 | End: 2017-11-17
Payer: COMMERCIAL

## 2017-11-17 ENCOUNTER — PREP FOR PROCEDURE (OUTPATIENT)
Dept: CARDIOLOGY CLINIC | Age: 62
End: 2017-11-17

## 2017-11-17 LAB
ABO: NORMAL
ANTIBODY SCREEN: NORMAL
APTT: 31.9 SECONDS (ref 22–38)
BACTERIA: ABNORMAL /HPF
BILIRUBIN URINE: NEGATIVE
BLOOD, URINE: NEGATIVE
CASTS 2: ABNORMAL /LPF
CASTS UA: ABNORMAL /LPF
CHARACTER, URINE: CLEAR
CHOLESTEROL, TOTAL: 181 MG/DL (ref 100–199)
COLOR: YELLOW
CRYSTALS, UA: ABNORMAL
EKG ATRIAL RATE: 64 BPM
EKG ATRIAL RATE: 76 BPM
EKG P AXIS: 45 DEGREES
EKG P AXIS: 54 DEGREES
EKG P-R INTERVAL: 134 MS
EKG P-R INTERVAL: 138 MS
EKG Q-T INTERVAL: 438 MS
EKG Q-T INTERVAL: 458 MS
EKG QRS DURATION: 78 MS
EKG QRS DURATION: 84 MS
EKG QTC CALCULATION (BAZETT): 472 MS
EKG QTC CALCULATION (BAZETT): 492 MS
EKG R AXIS: 1 DEGREES
EKG R AXIS: 4 DEGREES
EKG T AXIS: 68 DEGREES
EKG T AXIS: 95 DEGREES
EKG VENTRICULAR RATE: 64 BPM
EKG VENTRICULAR RATE: 76 BPM
EPITHELIAL CELLS, UA: ABNORMAL /HPF
FERRITIN: 95 NG/ML (ref 10–291)
GLUCOSE BLD-MCNC: 175 MG/DL (ref 70–108)
GLUCOSE BLD-MCNC: 99 MG/DL (ref 70–108)
GLUCOSE URINE: NEGATIVE MG/DL
HCT VFR BLD CALC: 36.6 % (ref 37–47)
HDLC SERPL-MCNC: 34 MG/DL
HEMOGLOBIN: 12.1 GM/DL (ref 12–16)
IRON SATURATION: 31 % (ref 20–50)
IRON: 78 UG/DL (ref 50–170)
KETONES, URINE: ABNORMAL
LDL CHOLESTEROL CALCULATED: ABNORMAL MG/DL
LEUKOCYTE ESTERASE, URINE: ABNORMAL
LV EF: 60 %
LVEF MODALITY: NORMAL
MCH RBC QN AUTO: 26.8 PG (ref 27–31)
MCHC RBC AUTO-ENTMCNC: 33.1 GM/DL (ref 33–37)
MCV RBC AUTO: 81 FL (ref 81–99)
MISCELLANEOUS 2: ABNORMAL
MRSA SCREEN RT-PCR: NEGATIVE
NITRITE, URINE: NEGATIVE
PDW BLD-RTO: 16.6 % (ref 11.5–14.5)
PH UA: 6.5
PHOSPHORUS: 3.2 MG/DL (ref 2.4–4.7)
PLATELET # BLD: 269 THOU/MM3 (ref 130–400)
PMV BLD AUTO: 7.6 MCM (ref 7.4–10.4)
PROTEIN UA: NEGATIVE
RBC # BLD: 4.51 MILL/MM3 (ref 4.2–5.4)
RBC URINE: ABNORMAL /HPF
RENAL EPITHELIAL, UA: ABNORMAL
RH FACTOR: NORMAL
SPECIFIC GRAVITY, URINE: > 1.03 (ref 1–1.03)
TOTAL IRON BINDING CAPACITY: 253 UG/DL (ref 171–450)
TRIGL SERPL-MCNC: 468 MG/DL (ref 0–199)
TROPONIN T: < 0.01 NG/ML
UROBILINOGEN, URINE: 0.2 EU/DL
WBC # BLD: 6.2 THOU/MM3 (ref 4.8–10.8)
WBC UA: ABNORMAL /HPF
YEAST: ABNORMAL

## 2017-11-17 PROCEDURE — 99223 1ST HOSP IP/OBS HIGH 75: CPT | Performed by: ANESTHESIOLOGY

## 2017-11-17 PROCEDURE — 93458 L HRT ARTERY/VENTRICLE ANGIO: CPT | Performed by: INTERNAL MEDICINE

## 2017-11-17 PROCEDURE — 93306 TTE W/DOPPLER COMPLETE: CPT

## 2017-11-17 PROCEDURE — 6370000000 HC RX 637 (ALT 250 FOR IP): Performed by: ANESTHESIOLOGY

## 2017-11-17 PROCEDURE — 93005 ELECTROCARDIOGRAM TRACING: CPT

## 2017-11-17 PROCEDURE — 85027 COMPLETE CBC AUTOMATED: CPT

## 2017-11-17 PROCEDURE — C1894 INTRO/SHEATH, NON-LASER: HCPCS

## 2017-11-17 PROCEDURE — 86900 BLOOD TYPING SEROLOGIC ABO: CPT

## 2017-11-17 PROCEDURE — 93000 ELECTROCARDIOGRAM COMPLETE: CPT | Performed by: ANESTHESIOLOGY

## 2017-11-17 PROCEDURE — 6370000000 HC RX 637 (ALT 250 FOR IP): Performed by: INTERNAL MEDICINE

## 2017-11-17 PROCEDURE — C1769 GUIDE WIRE: HCPCS

## 2017-11-17 PROCEDURE — 84100 ASSAY OF PHOSPHORUS: CPT

## 2017-11-17 PROCEDURE — 36415 COLL VENOUS BLD VENIPUNCTURE: CPT

## 2017-11-17 PROCEDURE — 99223 1ST HOSP IP/OBS HIGH 75: CPT | Performed by: INTERNAL MEDICINE

## 2017-11-17 PROCEDURE — 86850 RBC ANTIBODY SCREEN: CPT

## 2017-11-17 PROCEDURE — 87641 MR-STAPH DNA AMP PROBE: CPT

## 2017-11-17 PROCEDURE — 83550 IRON BINDING TEST: CPT

## 2017-11-17 PROCEDURE — 2500000003 HC RX 250 WO HCPCS

## 2017-11-17 PROCEDURE — 93458 L HRT ARTERY/VENTRICLE ANGIO: CPT

## 2017-11-17 PROCEDURE — 96361 HYDRATE IV INFUSION ADD-ON: CPT

## 2017-11-17 PROCEDURE — 2720000010 HC SURG SUPPLY STERILE

## 2017-11-17 PROCEDURE — A6258 TRANSPARENT FILM >16<=48 IN: HCPCS

## 2017-11-17 PROCEDURE — 84484 ASSAY OF TROPONIN QUANT: CPT

## 2017-11-17 PROCEDURE — 4A023N7 MEASUREMENT OF CARDIAC SAMPLING AND PRESSURE, LEFT HEART, PERCUTANEOUS APPROACH: ICD-10-PCS | Performed by: INTERNAL MEDICINE

## 2017-11-17 PROCEDURE — A6212 FOAM DRG <=16 SQ IN W/BORDER: HCPCS

## 2017-11-17 PROCEDURE — 80061 LIPID PANEL: CPT

## 2017-11-17 PROCEDURE — 6360000002 HC RX W HCPCS

## 2017-11-17 PROCEDURE — 85730 THROMBOPLASTIN TIME PARTIAL: CPT

## 2017-11-17 PROCEDURE — 2580000003 HC RX 258: Performed by: INTERNAL MEDICINE

## 2017-11-17 PROCEDURE — 1200000003 HC TELEMETRY R&B

## 2017-11-17 PROCEDURE — B2111ZZ FLUOROSCOPY OF MULTIPLE CORONARY ARTERIES USING LOW OSMOLAR CONTRAST: ICD-10-PCS | Performed by: INTERNAL MEDICINE

## 2017-11-17 PROCEDURE — 2580000003 HC RX 258: Performed by: ANESTHESIOLOGY

## 2017-11-17 PROCEDURE — 83540 ASSAY OF IRON: CPT

## 2017-11-17 PROCEDURE — 2780000010 HC IMPLANT OTHER

## 2017-11-17 PROCEDURE — 82728 ASSAY OF FERRITIN: CPT

## 2017-11-17 PROCEDURE — 6360000002 HC RX W HCPCS: Performed by: ANESTHESIOLOGY

## 2017-11-17 PROCEDURE — 99222 1ST HOSP IP/OBS MODERATE 55: CPT | Performed by: INTERNAL MEDICINE

## 2017-11-17 PROCEDURE — 86901 BLOOD TYPING SEROLOGIC RH(D): CPT

## 2017-11-17 PROCEDURE — 70450 CT HEAD/BRAIN W/O DYE: CPT

## 2017-11-17 PROCEDURE — 87081 CULTURE SCREEN ONLY: CPT

## 2017-11-17 PROCEDURE — 82948 REAGENT STRIP/BLOOD GLUCOSE: CPT

## 2017-11-17 PROCEDURE — 2500000003 HC RX 250 WO HCPCS: Performed by: ANESTHESIOLOGY

## 2017-11-17 PROCEDURE — B2151ZZ FLUOROSCOPY OF LEFT HEART USING LOW OSMOLAR CONTRAST: ICD-10-PCS | Performed by: INTERNAL MEDICINE

## 2017-11-17 RX ORDER — ASPIRIN 81 MG/1
324 TABLET, CHEWABLE ORAL ONCE
Status: COMPLETED | OUTPATIENT
Start: 2017-11-17 | End: 2017-11-17

## 2017-11-17 RX ORDER — LISINOPRIL 2.5 MG/1
2.5 TABLET ORAL DAILY
Status: DISCONTINUED | OUTPATIENT
Start: 2017-11-17 | End: 2017-11-18 | Stop reason: HOSPADM

## 2017-11-17 RX ORDER — QUETIAPINE 300 MG/1
300 TABLET, FILM COATED, EXTENDED RELEASE ORAL NIGHTLY
Status: DISCONTINUED | OUTPATIENT
Start: 2017-11-17 | End: 2017-11-18 | Stop reason: HOSPADM

## 2017-11-17 RX ORDER — LEVOTHYROXINE SODIUM 0.07 MG/1
75 TABLET ORAL DAILY
Status: DISCONTINUED | OUTPATIENT
Start: 2017-11-17 | End: 2017-11-18 | Stop reason: HOSPADM

## 2017-11-17 RX ORDER — DEXTROSE MONOHYDRATE 50 MG/ML
100 INJECTION, SOLUTION INTRAVENOUS PRN
Status: DISCONTINUED | OUTPATIENT
Start: 2017-11-17 | End: 2017-11-18 | Stop reason: HOSPADM

## 2017-11-17 RX ORDER — ISOSORBIDE DINITRATE 10 MG/1
10 TABLET ORAL 3 TIMES DAILY
Status: DISCONTINUED | OUTPATIENT
Start: 2017-11-17 | End: 2017-11-18 | Stop reason: HOSPADM

## 2017-11-17 RX ORDER — SUCRALFATE 1 G/1
1 TABLET ORAL 4 TIMES DAILY
Status: DISCONTINUED | OUTPATIENT
Start: 2017-11-17 | End: 2017-11-18 | Stop reason: HOSPADM

## 2017-11-17 RX ORDER — CIPROFLOXACIN 500 MG/1
500 TABLET, FILM COATED ORAL EVERY 12 HOURS SCHEDULED
Status: DISCONTINUED | OUTPATIENT
Start: 2017-11-17 | End: 2017-11-18

## 2017-11-17 RX ORDER — ATORVASTATIN CALCIUM 40 MG/1
40 TABLET, FILM COATED ORAL DAILY
Status: DISCONTINUED | OUTPATIENT
Start: 2017-11-17 | End: 2017-11-18 | Stop reason: HOSPADM

## 2017-11-17 RX ORDER — NICOTINE POLACRILEX 4 MG
15 LOZENGE BUCCAL PRN
Status: DISCONTINUED | OUTPATIENT
Start: 2017-11-17 | End: 2017-11-18 | Stop reason: HOSPADM

## 2017-11-17 RX ORDER — HYDROCODONE BITARTRATE AND ACETAMINOPHEN 5; 325 MG/1; MG/1
2 TABLET ORAL EVERY 4 HOURS PRN
Status: DISCONTINUED | OUTPATIENT
Start: 2017-11-17 | End: 2017-11-17

## 2017-11-17 RX ORDER — TRAZODONE HYDROCHLORIDE 50 MG/1
50 TABLET ORAL NIGHTLY PRN
Status: DISCONTINUED | OUTPATIENT
Start: 2017-11-17 | End: 2017-11-18 | Stop reason: HOSPADM

## 2017-11-17 RX ORDER — NITROGLYCERIN 20 MG/100ML
5 INJECTION INTRAVENOUS CONTINUOUS
Status: DISCONTINUED | OUTPATIENT
Start: 2017-11-17 | End: 2017-11-17

## 2017-11-17 RX ORDER — NITROGLYCERIN 0.4 MG/1
0.4 TABLET SUBLINGUAL EVERY 5 MIN PRN
Status: DISCONTINUED | OUTPATIENT
Start: 2017-11-17 | End: 2017-11-18 | Stop reason: HOSPADM

## 2017-11-17 RX ORDER — ESCITALOPRAM OXALATE 20 MG/1
20 TABLET ORAL EVERY MORNING
Status: DISCONTINUED | OUTPATIENT
Start: 2017-11-18 | End: 2017-11-18 | Stop reason: HOSPADM

## 2017-11-17 RX ORDER — MAGNESIUM SULFATE IN WATER 40 MG/ML
2 INJECTION, SOLUTION INTRAVENOUS ONCE
Status: COMPLETED | OUTPATIENT
Start: 2017-11-17 | End: 2017-11-17

## 2017-11-17 RX ORDER — ACETAMINOPHEN 325 MG/1
650 TABLET ORAL EVERY 4 HOURS PRN
Status: DISCONTINUED | OUTPATIENT
Start: 2017-11-17 | End: 2017-11-17 | Stop reason: SDUPTHER

## 2017-11-17 RX ORDER — CIPROFLOXACIN 2 MG/ML
400 INJECTION, SOLUTION INTRAVENOUS EVERY 12 HOURS
Status: DISCONTINUED | OUTPATIENT
Start: 2017-11-17 | End: 2017-11-17 | Stop reason: ALTCHOICE

## 2017-11-17 RX ORDER — POTASSIUM CHLORIDE 20 MEQ/1
40 TABLET, EXTENDED RELEASE ORAL
Status: DISCONTINUED | OUTPATIENT
Start: 2017-11-17 | End: 2017-11-17 | Stop reason: ALTCHOICE

## 2017-11-17 RX ORDER — SODIUM CHLORIDE 9 MG/ML
INJECTION, SOLUTION INTRAVENOUS CONTINUOUS
Status: DISCONTINUED | OUTPATIENT
Start: 2017-11-17 | End: 2017-11-18

## 2017-11-17 RX ORDER — FERROUS SULFATE 325(65) MG
325 TABLET ORAL EVERY OTHER DAY
Status: DISCONTINUED | OUTPATIENT
Start: 2017-11-17 | End: 2017-11-18 | Stop reason: HOSPADM

## 2017-11-17 RX ORDER — ESCITALOPRAM OXALATE 20 MG/1
20 TABLET ORAL EVERY MORNING
Status: DISCONTINUED | OUTPATIENT
Start: 2017-11-17 | End: 2017-11-17 | Stop reason: SDUPTHER

## 2017-11-17 RX ORDER — HYDROXYZINE HYDROCHLORIDE 10 MG/1
10 TABLET, FILM COATED ORAL 3 TIMES DAILY PRN
Status: DISCONTINUED | OUTPATIENT
Start: 2017-11-17 | End: 2017-11-18 | Stop reason: HOSPADM

## 2017-11-17 RX ORDER — LOPERAMIDE HYDROCHLORIDE 2 MG/1
2 CAPSULE ORAL DAILY
Status: DISCONTINUED | OUTPATIENT
Start: 2017-11-17 | End: 2017-11-18 | Stop reason: HOSPADM

## 2017-11-17 RX ORDER — FLUTICASONE PROPIONATE 50 MCG
1 SPRAY, SUSPENSION (ML) NASAL DAILY
Status: DISCONTINUED | OUTPATIENT
Start: 2017-11-17 | End: 2017-11-18 | Stop reason: HOSPADM

## 2017-11-17 RX ORDER — PANTOPRAZOLE SODIUM 40 MG/1
40 TABLET, DELAYED RELEASE ORAL DAILY
Status: DISCONTINUED | OUTPATIENT
Start: 2017-11-17 | End: 2017-11-18 | Stop reason: HOSPADM

## 2017-11-17 RX ORDER — TRAZODONE HYDROCHLORIDE 100 MG/1
200 TABLET ORAL NIGHTLY PRN
Status: ON HOLD | COMMUNITY
End: 2018-10-31

## 2017-11-17 RX ORDER — FAMOTIDINE 20 MG/1
20 TABLET, FILM COATED ORAL 2 TIMES DAILY
Status: CANCELLED | OUTPATIENT
Start: 2017-11-17

## 2017-11-17 RX ORDER — SODIUM CHLORIDE 0.9 % (FLUSH) 0.9 %
10 SYRINGE (ML) INJECTION EVERY 12 HOURS SCHEDULED
Status: DISCONTINUED | OUTPATIENT
Start: 2017-11-17 | End: 2017-11-17 | Stop reason: SDUPTHER

## 2017-11-17 RX ORDER — ATROPINE SULFATE 0.4 MG/ML
0.5 AMPUL (ML) INJECTION
Status: ACTIVE | OUTPATIENT
Start: 2017-11-17 | End: 2017-11-17

## 2017-11-17 RX ORDER — CLOBETASOL PROPIONATE 0.5 MG/G
CREAM TOPICAL 2 TIMES DAILY
COMMUNITY
End: 2019-02-21

## 2017-11-17 RX ORDER — HYDROCODONE BITARTRATE AND ACETAMINOPHEN 5; 325 MG/1; MG/1
1 TABLET ORAL EVERY 4 HOURS PRN
Status: DISCONTINUED | OUTPATIENT
Start: 2017-11-17 | End: 2017-11-18 | Stop reason: HOSPADM

## 2017-11-17 RX ORDER — SODIUM CHLORIDE 9 MG/ML
INJECTION, SOLUTION INTRAVENOUS CONTINUOUS
Status: DISCONTINUED | OUTPATIENT
Start: 2017-11-17 | End: 2017-11-17 | Stop reason: SDUPTHER

## 2017-11-17 RX ORDER — ACETAMINOPHEN 325 MG/1
650 TABLET ORAL EVERY 4 HOURS PRN
Status: DISCONTINUED | OUTPATIENT
Start: 2017-11-17 | End: 2017-11-18 | Stop reason: HOSPADM

## 2017-11-17 RX ORDER — CLOPIDOGREL BISULFATE 75 MG/1
75 TABLET ORAL DAILY
Status: DISCONTINUED | OUTPATIENT
Start: 2017-11-17 | End: 2017-11-18 | Stop reason: HOSPADM

## 2017-11-17 RX ORDER — MORPHINE SULFATE 4 MG/ML
4 INJECTION, SOLUTION INTRAMUSCULAR; INTRAVENOUS
Status: DISCONTINUED | OUTPATIENT
Start: 2017-11-17 | End: 2017-11-17

## 2017-11-17 RX ORDER — ONDANSETRON 2 MG/ML
4 INJECTION INTRAMUSCULAR; INTRAVENOUS EVERY 6 HOURS PRN
Status: DISCONTINUED | OUTPATIENT
Start: 2017-11-17 | End: 2017-11-17 | Stop reason: SDUPTHER

## 2017-11-17 RX ORDER — SODIUM CHLORIDE 0.9 % (FLUSH) 0.9 %
10 SYRINGE (ML) INJECTION EVERY 12 HOURS SCHEDULED
Status: DISCONTINUED | OUTPATIENT
Start: 2017-11-17 | End: 2017-11-18 | Stop reason: HOSPADM

## 2017-11-17 RX ORDER — SODIUM CHLORIDE 0.9 % (FLUSH) 0.9 %
10 SYRINGE (ML) INJECTION PRN
Status: DISCONTINUED | OUTPATIENT
Start: 2017-11-17 | End: 2017-11-17 | Stop reason: SDUPTHER

## 2017-11-17 RX ORDER — HEPARIN SODIUM 10000 [USP'U]/100ML
12 INJECTION, SOLUTION INTRAVENOUS CONTINUOUS
Status: DISCONTINUED | OUTPATIENT
Start: 2017-11-17 | End: 2017-11-17

## 2017-11-17 RX ORDER — ESCITALOPRAM OXALATE 20 MG/1
30 TABLET ORAL EVERY MORNING
Status: ON HOLD | COMMUNITY
End: 2018-10-31

## 2017-11-17 RX ORDER — DEXTROSE MONOHYDRATE 25 G/50ML
12.5 INJECTION, SOLUTION INTRAVENOUS PRN
Status: DISCONTINUED | OUTPATIENT
Start: 2017-11-17 | End: 2017-11-18 | Stop reason: HOSPADM

## 2017-11-17 RX ORDER — SODIUM CHLORIDE 0.9 % (FLUSH) 0.9 %
10 SYRINGE (ML) INJECTION PRN
Status: DISCONTINUED | OUTPATIENT
Start: 2017-11-17 | End: 2017-11-18 | Stop reason: HOSPADM

## 2017-11-17 RX ORDER — POTASSIUM CHLORIDE 20 MEQ/1
40 TABLET, EXTENDED RELEASE ORAL ONCE
Status: COMPLETED | OUTPATIENT
Start: 2017-11-17 | End: 2017-11-17

## 2017-11-17 RX ORDER — ALBUTEROL SULFATE 90 UG/1
2 AEROSOL, METERED RESPIRATORY (INHALATION) EVERY 6 HOURS PRN
Status: DISCONTINUED | OUTPATIENT
Start: 2017-11-17 | End: 2017-11-18 | Stop reason: HOSPADM

## 2017-11-17 RX ORDER — ONDANSETRON 2 MG/ML
4 INJECTION INTRAMUSCULAR; INTRAVENOUS EVERY 6 HOURS PRN
Status: DISCONTINUED | OUTPATIENT
Start: 2017-11-17 | End: 2017-11-18 | Stop reason: HOSPADM

## 2017-11-17 RX ORDER — HYDROCHLOROTHIAZIDE 25 MG/1
25 TABLET ORAL 2 TIMES DAILY
Status: DISCONTINUED | OUTPATIENT
Start: 2017-11-17 | End: 2017-11-18

## 2017-11-17 RX ORDER — HYDROCODONE BITARTRATE AND ACETAMINOPHEN 5; 325 MG/1; MG/1
1 TABLET ORAL EVERY 4 HOURS PRN
Status: DISCONTINUED | OUTPATIENT
Start: 2017-11-17 | End: 2017-11-17

## 2017-11-17 RX ORDER — LANOLIN ALCOHOL/MO/W.PET/CERES
500 CREAM (GRAM) TOPICAL DAILY
Status: DISCONTINUED | OUTPATIENT
Start: 2017-11-17 | End: 2017-11-18 | Stop reason: HOSPADM

## 2017-11-17 RX ORDER — DOXEPIN HYDROCHLORIDE 50 MG/1
100 CAPSULE ORAL NIGHTLY
Status: DISCONTINUED | OUTPATIENT
Start: 2017-11-17 | End: 2017-11-18 | Stop reason: HOSPADM

## 2017-11-17 RX ORDER — ASPIRIN 81 MG/1
162 TABLET ORAL DAILY
Status: DISCONTINUED | OUTPATIENT
Start: 2017-11-18 | End: 2017-11-18

## 2017-11-17 RX ORDER — MORPHINE SULFATE 2 MG/ML
2 INJECTION, SOLUTION INTRAMUSCULAR; INTRAVENOUS
Status: DISCONTINUED | OUTPATIENT
Start: 2017-11-17 | End: 2017-11-17

## 2017-11-17 RX ORDER — NICOTINE 21 MG/24HR
1 PATCH, TRANSDERMAL 24 HOURS TRANSDERMAL DAILY
Status: DISCONTINUED | OUTPATIENT
Start: 2017-11-17 | End: 2017-11-18 | Stop reason: HOSPADM

## 2017-11-17 RX ADMIN — ASPIRIN 81 MG 324 MG: 81 TABLET ORAL at 05:27

## 2017-11-17 RX ADMIN — HYDROCODONE BITARTRATE AND ACETAMINOPHEN 1 TABLET: 5; 325 TABLET ORAL at 04:59

## 2017-11-17 RX ADMIN — HYDROCODONE BITARTRATE AND ACETAMINOPHEN 1 TABLET: 5; 325 TABLET ORAL at 11:11

## 2017-11-17 RX ADMIN — POTASSIUM CHLORIDE 40 MEQ: 1500 TABLET, EXTENDED RELEASE ORAL at 05:29

## 2017-11-17 RX ADMIN — PANTOPRAZOLE SODIUM 40 MG: 40 TABLET, DELAYED RELEASE ORAL at 06:48

## 2017-11-17 RX ADMIN — METOPROLOL TARTRATE 25 MG: 25 TABLET ORAL at 09:35

## 2017-11-17 RX ADMIN — SUCRALFATE 1 G: 1 TABLET ORAL at 22:24

## 2017-11-17 RX ADMIN — NITROGLYCERIN 5 MCG/MIN: 20 INJECTION INTRAVENOUS at 05:27

## 2017-11-17 RX ADMIN — FERROUS SULFATE TAB 325 MG (65 MG ELEMENTAL FE) 325 MG: 325 (65 FE) TAB at 09:34

## 2017-11-17 RX ADMIN — LISINOPRIL 2.5 MG: 2.5 TABLET ORAL at 09:35

## 2017-11-17 RX ADMIN — Medication 2 UNITS: at 12:03

## 2017-11-17 RX ADMIN — HYDROCODONE BITARTRATE AND ACETAMINOPHEN 1 TABLET: 5; 325 TABLET ORAL at 22:24

## 2017-11-17 RX ADMIN — ISOSORBIDE DINITRATE 10 MG: 10 TABLET ORAL at 22:23

## 2017-11-17 RX ADMIN — FLUTICASONE PROPIONATE 1 SPRAY: 50 SPRAY, METERED NASAL at 10:00

## 2017-11-17 RX ADMIN — CIPROFLOXACIN 400 MG: 2 INJECTION, SOLUTION INTRAVENOUS at 09:36

## 2017-11-17 RX ADMIN — CYANOCOBALAMIN TAB 1000 MCG 500 MCG: 1000 TAB at 09:35

## 2017-11-17 RX ADMIN — MAGNESIUM SULFATE HEPTAHYDRATE 2 G: 40 INJECTION, SOLUTION INTRAVENOUS at 07:38

## 2017-11-17 RX ADMIN — QUETIAPINE FUMARATE 300 MG: 300 TABLET, EXTENDED RELEASE ORAL at 22:23

## 2017-11-17 RX ADMIN — LEVOTHYROXINE SODIUM 75 MCG: 75 TABLET ORAL at 06:48

## 2017-11-17 RX ADMIN — HYDROXYZINE HYDROCHLORIDE 10 MG: 10 TABLET, FILM COATED ORAL at 09:35

## 2017-11-17 RX ADMIN — SUCRALFATE 1 G: 1 TABLET ORAL at 09:35

## 2017-11-17 RX ADMIN — HYDROCHLOROTHIAZIDE 25 MG: 25 TABLET ORAL at 09:48

## 2017-11-17 RX ADMIN — CIPROFLOXACIN 500 MG: 500 TABLET, FILM COATED ORAL at 22:24

## 2017-11-17 RX ADMIN — SODIUM CHLORIDE: 9 INJECTION, SOLUTION INTRAVENOUS at 12:30

## 2017-11-17 RX ADMIN — TRAZODONE HYDROCHLORIDE 50 MG: 50 TABLET ORAL at 22:23

## 2017-11-17 RX ADMIN — LOPERAMIDE HYDROCHLORIDE 2 MG: 2 CAPSULE ORAL at 09:35

## 2017-11-17 RX ADMIN — TIOTROPIUM BROMIDE 18 MCG: 18 CAPSULE ORAL; RESPIRATORY (INHALATION) at 11:08

## 2017-11-17 RX ADMIN — CLOPIDOGREL 75 MG: 75 TABLET, FILM COATED ORAL at 09:34

## 2017-11-17 RX ADMIN — Medication 10 ML: at 09:51

## 2017-11-17 RX ADMIN — SODIUM CHLORIDE: 9 INJECTION, SOLUTION INTRAVENOUS at 19:17

## 2017-11-17 ASSESSMENT — PAIN DESCRIPTION - DESCRIPTORS
DESCRIPTORS: PRESSURE
DESCRIPTORS: PRESSURE

## 2017-11-17 ASSESSMENT — ENCOUNTER SYMPTOMS
PHOTOPHOBIA: 0
ORTHOPNEA: 0
COUGH: 0
DOUBLE VISION: 0
SHORTNESS OF BREATH: 0
SPUTUM PRODUCTION: 0
BLURRED VISION: 0
VOMITING: 0
BACK PAIN: 0
HEARTBURN: 0
NAUSEA: 0
ABDOMINAL PAIN: 0

## 2017-11-17 ASSESSMENT — PAIN DESCRIPTION - LOCATION
LOCATION: BACK
LOCATION: CHEST
LOCATION: NECK
LOCATION: CHEST
LOCATION: CHEST
LOCATION: BACK

## 2017-11-17 ASSESSMENT — PAIN DESCRIPTION - PAIN TYPE
TYPE: CHRONIC PAIN
TYPE: ACUTE PAIN
TYPE: CHRONIC PAIN
TYPE: ACUTE PAIN
TYPE: CHRONIC PAIN

## 2017-11-17 ASSESSMENT — PAIN SCALES - GENERAL
PAINLEVEL_OUTOF10: 7
PAINLEVEL_OUTOF10: 6
PAINLEVEL_OUTOF10: 4
PAINLEVEL_OUTOF10: 7
PAINLEVEL_OUTOF10: 4
PAINLEVEL_OUTOF10: 7
PAINLEVEL_OUTOF10: 7
PAINLEVEL_OUTOF10: 0
PAINLEVEL_OUTOF10: 0
PAINLEVEL_OUTOF10: 10
PAINLEVEL_OUTOF10: 6
PAINLEVEL_OUTOF10: 5
PAINLEVEL_OUTOF10: 4

## 2017-11-17 ASSESSMENT — PAIN DESCRIPTION - ORIENTATION: ORIENTATION: LOWER

## 2017-11-17 NOTE — H&P
as of 11/17/2017 - Review Complete 11/16/2017   Allergen Reaction Noted    Seasonal Other (See Comments) 06/25/2014     Additional information:       MEDICATIONS   Aspirin  [x] 81 mg  [] 325 mg  [] None  Antiplatelet drug therapy use last 5 days  []No [x]Yes  Coumadin Use Last 5 Days [x]No []Yes  Other anticoagulant use last 5 days  [x]No []Yes  No current outpatient prescriptions on file. Prior to Admission medications    Medication Sig Start Date End Date Taking? Authorizing Provider   escitalopram (LEXAPRO) 20 MG tablet Take 20 mg by mouth every morning    Historical Provider, MD   traZODone (DESYREL) 50 MG tablet Take 50 mg by mouth nightly as needed for Sleep    Historical Provider, MD   clobetasol (TEMOVATE) 0.05 % cream Apply topically 2 times daily Apply topically to corners of mouth 2 times daily. Historical Provider, MD   nystatin (NYSTATIN) 077099 UNIT/GM powder Apply topically daily as needed Apply topically 4 times daily.     Historical Provider, MD   clopidogrel (PLAVIX) 75 MG tablet Take 1 tablet by mouth daily 9/6/17   Governor Meter, DO   loperamide (LOPERAMIDE A-D) 2 MG tablet Take 2 mg by mouth daily    Historical Provider, MD   sucralfate (CARAFATE) 1 GM tablet Take 1 g by mouth 4 times daily    Historical Provider, MD   isosorbide dinitrate (ISORDIL) 10 MG tablet Take 1 tablet by mouth 3 times daily 8/10/17   Governor Meter, DO   TUDORZA PRESSAIR 400 MCG/ACT AEPB inhaler 1 puff 2 times daily  4/14/17   Historical Provider, MD   lisinopril (PRINIVIL;ZESTRIL) 2.5 MG tablet Take 2.5 mg by mouth daily    Historical Provider, MD   fluticasone (FLONASE) 50 MCG/ACT nasal spray 1 spray by Nasal route daily Each nostril    Historical Provider, MD   potassium chloride (KLOR-CON M) 20 MEQ extended release tablet Take 1 tablet by mouth daily  Patient taking differently: Take 99 mEq by mouth daily  5/1/17   Kayy Ghosh MD   ferrous sulfate 325 (65 FE) MG tablet Take 325 mg by mouth daily (with breakfast)     Historical Provider, MD   vitamin B-12 (CYANOCOBALAMIN) 500 MCG tablet Take 500 mcg by mouth daily    Historical Provider, MD   doxepin (SINEQUAN) 100 MG capsule Take 100 mg by mouth nightly     Historical Provider, MD   atorvastatin (LIPITOR) 20 MG tablet Take 20 mg by mouth daily     Historical Provider, MD   hydrochlorothiazide (HYDRODIURIL) 25 MG tablet Take 25 mg by mouth 2 times daily    Historical Provider, MD   metFORMIN (GLUCOPHAGE) 500 MG tablet Take 500 mg by mouth 2 times daily (with meals)     Historical Provider, MD   QUEtiapine (SEROQUEL XR) 300 MG extended release tablet Take 300 mg by mouth nightly     Historical Provider, MD   levothyroxine (SYNTHROID) 75 MCG tablet Take 75 mcg by mouth Daily     Historical Provider, MD   hydrOXYzine (ATARAX) 50 MG tablet Take 50 mg by mouth 3 times daily as needed for Anxiety     Historical Provider, MD   pantoprazole (PROTONIX) 40 MG tablet Take 1 tablet by mouth daily Indications: Gastroesophageal Reflux Disease 4/15/16   Armando Dorman MD   albuterol (PROVENTIL HFA;VENTOLIN HFA) 108 (90 BASE) MCG/ACT inhaler Inhale 2 puffs into the lungs every 6 hours as needed for Wheezing. Historical Provider, MD     Additional information:       VITAL SIGNS   There were no vitals filed for this visit.     PHYSICAL:   General: No acute distress  HEENT:  Unremarkable for age  Neck: without increased JVD, carotid pulses 2+ bilaterally without bruits  Heart: RRR, S1 & S2 WNL, S4 gallop, without murmurs or rubs    Lungs: Clear to auscultation    Abdomen: BS present, without HSM, masses, or tenderness    Extremities: without C,C,E.  Pulses 2+ bilaterally  Mental Status: Alert & Oriented        PLANNED PROCEDURE   [x]Cath  [x]PCI                []Pacemaker/AICD  []CLAUDIA             []Cardioversion []Peripheral angiography/PTA  []Other:      SEDATION  Planned agent:[x]Midazolam []Meperidine [x]Sublimaze []Morphine  []Diazepam  []Other:     ASA Classification:  []1

## 2017-11-17 NOTE — CONSULTS
Labs      11/16/17   2112  11/17/17   0801   NA  138   --    K  3.3*   --    CL  96*   --    CO2  26   --    PHOS   --   3.2   BUN  8   --    CREATININE  0.6   --        Lab Results   Component Value Date    ALKPHOS 87 11/16/2017    ALT 29 11/16/2017    AST 26 11/16/2017    PROT 6.6 11/16/2017    BILITOT 0.2 11/16/2017    BILIDIR <0.2 11/16/2017    LABALBU 4.4 11/16/2017    LABALBU 4.3 04/19/2012       Lab Results   Component Value Date    LABA1C 7.2 03/22/2017       Lab Results   Component Value Date    TRIG 468 11/17/2017    HDL 34 11/17/2017    LDLCALC SEE BELOW 11/17/2017    LDLDIRECT 93.00 07/23/2011       Lab Results   Component Value Date    TSH 1.610 11/16/2017         Xr Chest Standard (2 Vw)    Result Date: 11/16/2017  PROCEDURE: XR CHEST STANDARD TWO VW CLINICAL INFORMATION: chest pain, . COMPARISON: Chest x-ray dated 5/22/2017 TECHNIQUE: PA and lateral views the chest. FINDINGS: Lungs: No pneumonia, pulmonary edema, or obvious mass. Pleura: No pleural effusion. No pneumothorax. HEART: Unremarkable. No cardiomegaly. Mediastinum/edouard: Unremarkable. No obvious mass or adenopathy. Skeleton: Unremarkable. No significant bone or joint abnormality. No acute cardiopulmonary disease. **This report has been created using voice recognition software. It may contain minor errors which are inherent in voice recognition technology. ** Final report electronically signed by Dr. Chetan Xiong on 11/16/2017 9:19 PM    Cta Chest W Wo Contrast    Result Date: 11/16/2017  PROCEDURE: CTA CHEST W WO CONTRAST CLINICAL INFORMATION: Chest and back pain, history of PEs. Attention aortic dissection. COMPARISON: Chest x-ray obtained today and the prior chest CTA dated 1/23/2017. TECHNIQUE: 1.5 mm axial images were obtained through the chest after the administration of IV contrast.  A non-contrast localizer was obtained.   3D reconstructions were performed on the scanner to include sagittal and coronal images through the chest. 80 dose to as low as reasonably achievable. FINDINGS: Lung bases, cardiomediastinum, and chest wall: See the accompanying chest CT report Liver: There is fatty infiltration of the liver. . No biliary dilatation. No change in the approximately 1.5 x 1.3 cm nonenhancing hypodense lesion in the anterior aspect lateral segment left lobe of the liver, possibly a cyst. Consider ultrasound for further evaluation. Gallbladder, bile ducts: Patient is status post cholecystectomy with clips in the gallbladder fossa. There is mild dilatation of the common duct, within normal limits postcholecystectomy. Spleen: Unremarkable Pancreas: Unremarkable. No mass or ductal dilatation. Adrenal glands: Unremarkable Kidneys and ureters: Unremarkable. No hydroureteronephrosis, mass, or cyst. No renal or ureteral calculi. Bladder: Unremarkable. No wall thickening, obvious mass, or calculi. Reproductive: The uterus is absent consistent with hysterectomy. The adnexal regions are unremarkable. There is an enhancing subcentimeter nodular focus in the right vaginal cuff, etiology and significance uncertain. Stomach, small bowel, colon:? The stomach and duodenum are grossly normal. There is a moderate to large amount of stool throughout the colon consistent with constipation. Small bowel and colon are otherwise grossly normal. No bowel wall thickening or evidence of bowel obstruction. Appendix: The appendix is visualized and appears normal without evidence of periappendiceal inflammatory changes. Omentum and mesentery: Unremarkable. Peritoneum:  There is no ascites, abscess, adenopathy, or mass. No pneumoperitoneum. Abdominal and pelvic body wall soft tissues: There is a small fat-containing umbilical hernia. . Musculoskeletal:  Unremarkable Aorta and iliac arteries: No evidence of an aortic aneurysm or dissection. There is no hemodynamically significant stenosis or occlusion in the common, internal, or external iliac arteries.  Mesenteric Arteries:

## 2017-11-17 NOTE — ED PROVIDER NOTES
Care of this patient was assumed patient still states that she has a small amount of substernal chest pain. States the pain does radiate through to her back. States significantly improved with the nitroglycerin that had been provided by EMS. Patient states that this time that she has no other complaints. Physical examination was unremarkable. Patient labs and x-rays were reviewed of interest patient is positive for cocaine and for opiates. Patient CTA of the chest demonstrates no PE or other acute abnormalities. Labs were otherwise reassuring. Discussed the patient's condition with the hospitalist service who agreed to admit the patient to their service for further evaluation and workup.      JENN Sanchez  11/17/17 0308
complaints at this time. REVIEW OF SYSTEMS     Review of Systems   Constitutional: Negative for chills, fatigue and fever. HENT: Negative for congestion, ear pain, rhinorrhea and sore throat. Eyes: Negative for discharge and redness. Respiratory: Positive for shortness of breath. Negative for cough and wheezing. Cardiovascular: Positive for chest pain. Negative for palpitations and leg swelling. Gastrointestinal: Positive for abdominal pain. Negative for constipation, diarrhea, nausea and vomiting. Genitourinary: Negative for difficulty urinating, dysuria and vaginal discharge. Musculoskeletal: Positive for back pain. Negative for arthralgias, myalgias, neck pain and neck stiffness. Skin: Negative for color change, pallor and rash. Neurological: Negative for dizziness, syncope, weakness, light-headedness, numbness and headaches. Hematological: Negative for adenopathy. Psychiatric/Behavioral: Negative for agitation, confusion, dysphoric mood and suicidal ideas. The patient is not nervous/anxious. Heart Score for chest pain patients  History: Slightly Suspicious  ECG: Non-Specifc repolarization disturbance/LBTB/PM  Patient Age: > 39 and < 65 years  *Risk factors for Atherosclerotic disease: Cigarette smoking, Diabetes Mellitus, Obesity, Coronary Artery Disease, Hypertension  Risk Factors: > 3 Risk factors or history of atherosclerotic disease*  Troponin: < 1X normal limit  Heart Score Total: 4     PAST MEDICAL HISTORY    has a past medical history of Allergic rhinitis; Arthritis; Bipolar disorder ([facility] [address] Cancer ([facility] [address] Colon polyps; COPD (chronic obstructive pulmonary disease) ([facility] [address] Depression; Diabetes ([facility] [address] Diverticulitis of colon; GERD (gastroesophageal reflux disease); Hiatal hernia; History of colonoscopy; History of kidney stones; Hx of blood clots; Hyperlipidemia; Hypertension; Liver disease; Pneumonia; Suicidal thoughts; Thyroid disease; and Vomiting.     SURGICAL HISTORY

## 2017-11-17 NOTE — CONSULTS
Pulmonary & Critical Care Consultation Note   Andressa Duff MD         REASON FOR CONSULTATION/CC: assistance with critical care management    Consult at request of  Lori Goodpasture, MD  PCP: Adolfo Wan CNP    HISTORY OF PRESENT ILLNESS:   63y.o. year old female presented with an over two day history of progressively worsening chest pain, described as a constant substernal pressure radiating to her left upper extremity. She did have associated shortness of breath that was triggered with any exertion. Known history of CAD with recent cardiac cath two months ago. Workup in the ED included a tox screen that was positive for cocaine, an ECG which showed changes concerning for an anterior infarct. She was admitted to the ICU and placed on a nitro gtt which did partly relieve her symptoms.        Past Medical History:   Diagnosis Date    Allergic rhinitis     Arthritis     spine    Bipolar disorder ([address]     Cancer ([facility] [address] 2011    cancerous polyps removed - Dr. Vamsi Villalta Colon polyps     COPD (chronic obstructive pulmonary disease) ([address] 7/24/2014    Depression     Diabetes ([address]     HgA1c 7.2 3/2017 - started Metformin and FSBS <150    Diverticulitis of colon     GERD (gastroesophageal reflux disease)     Hiatal hernia     History of colonoscopy 2002    History of kidney stones     Hx of blood clots 6/17/2014    PE and collar bone area after shoulder surgery    Hyperlipidemia     Hypertension     Liver disease     enlarged liver - damaged with alcohol in past but no cirrhosis per patient    Pneumonia 7/24/2014    Suicidal thoughts     2015 [facility]    Thyroid disease     Vomiting        Past Surgical History:   Procedure Laterality Date    ABDOMEN SURGERY      x4 removed cysts and ovary removed    CARPAL TUNNEL RELEASE Bilateral 2016    CHOLECYSTECTOMY, LAPAROSCOPIC  6/12/15    Dr. Candis Maldonado, ESOPHAGUS     [address] Right Daily    metoprolol tartrate  25 mg Oral BID    enoxaparin  1 mg/kg Subcutaneous Q12H    phosphorus replacement protocol   Other RX Placeholder    magnesium replacement protocol   Other RX Placeholder    potassium (CARDIAC) replacement protocol   Other RX Placeholder    insulin lispro  0-12 Units Subcutaneous TID WC    insulin lispro  0-6 Units Subcutaneous Nightly    ciprofloxacin  400 mg Intravenous Q12H       Continuous Infusions:   nitroGLYCERIN 15 mcg/min (11/17/17 5135)    dextrose         PRN Meds:  albuterol sulfate HFA, hydrOXYzine, sodium chloride flush, acetaminophen, magnesium hydroxide, ondansetron, HYDROcodone 5 mg - acetaminophen **OR** HYDROcodone 5 mg - acetaminophen, morphine **OR** morphine, nitroGLYCERIN, glucose, dextrose, glucagon (rDNA), dextrose    ALLERGIES:  Patient is allergic to seasonal.    REVIEW OF SYSTEMS:  Review of Systems   Constitutional: Negative for chills, fever and weight loss. HENT: Negative for hearing loss and nosebleeds. Eyes: Negative for blurred vision, double vision and photophobia. Respiratory: Negative for cough, sputum production and shortness of breath. Cardiovascular: Positive for chest pain. Negative for palpitations and orthopnea. Gastrointestinal: Negative for abdominal pain, heartburn, nausea and vomiting. Genitourinary: Negative for dysuria, frequency and urgency. Musculoskeletal: Negative for back pain, myalgias and neck pain. Neurological: Negative for dizziness, focal weakness, loss of consciousness and headaches. Endo/Heme/Allergies: Negative for environmental allergies and polydipsia. Does not bruise/bleed easily. Objective:   PHYSICAL EXAM:  /73   Pulse 70   Temp 97.7 °F (36.5 °C) (Oral)   Resp 21   Ht 5' 4\" (1.626 m)   Wt 165 lb (74.8 kg)   SpO2 95%   BMI 28.32 kg/m²    Physical Exam   Constitutional: She appears well-developed and well-nourished. No distress. HENT:   Head: Normocephalic and atraumatic. Mouth/Throat: Oropharynx is clear and moist. No oropharyngeal exudate. Eyes: EOM are normal. Pupils are equal, round, and reactive to light. Neck: Neck supple. No JVD present. Cardiovascular: Normal heart sounds. Exam reveals no gallop and no friction rub. No murmur heard. Pulmonary/Chest: Effort normal. She has no wheezes. She has no rales. Equal chest rise and expansion bilaterally   Abdominal: Soft. Bowel sounds are normal. She exhibits no distension. There is no tenderness. Musculoskeletal: Normal range of motion. She exhibits no edema. Lymphadenopathy:     She has no cervical adenopathy. Neurological: She is alert. No cranial nerve deficit. CN 2-12 grossly intact   Skin: Skin is warm and dry. No rash noted. She is not diaphoretic. Data Reviewed:   LABS:  CBC:   Recent Labs      11/16/17 2112   WBC  7.9   HGB  13.7   HCT  40.0   MCV  80.0*   PLT  294     BMP:   Recent Labs      11/16/17 2112 11/17/17   0801   NA  138   --    K  3.3*   --    CL  96*   --    CO2  26   --    PHOS   --   3.2   BUN  8   --    CREATININE  0.6   --      LIVER PROFILE:   Recent Labs      11/16/17 2112   AST  26   ALT  29   LIPASE  53.8*   BILIDIR  <0.2   BILITOT  0.2*   ALKPHOS  87     PT/INR: No results for input(s): PROTIME, INR in the last 72 hours. APTT: No results for input(s): APTT in the last 72 hours. UA:  Recent Labs      11/16/17   2308   COLORU  YELLOW   PHUR  6.5   WBCUA  5-10   RBCUA  3-5   YEAST  NONE SEEN   BACTERIA  FEW   LEUKOCYTESUR  MODERATE*   UROBILINOGEN  0.2   BILIRUBINUR  NEGATIVE   BLOODU  NEGATIVE   GLUCOSEU  NEGATIVE     No results for input(s): PHART, FHH6EMI, PO2ART in the last 72 hours. CT chest personally reviewed, no PE, no acute lung parenchymal process          Assessment:     1. Chest pain with suspected ACS  2. Cocaine use  3. COPD without acute exac  4. CAD, HTN  5. DM2  6.  GERD    Plan:      -Nitro gtt which we will try to wean offf, aspirin, lovenox.   -Already on ACEI, plavix, Statin, and isordil  -Consult cardiology.   -PRN nebs and Spiriva  -Sliding scale insulin  -Protonix  -Possible transfer out of ICU pending further cardiac input      Val Barcenas MD

## 2017-11-17 NOTE — ED NOTES
Patient resting in bed. Respirations easy and unlabored. No distress noted. Call light within reach.         Guilherme Bejarano RN  11/16/17 5489

## 2017-11-17 NOTE — ED NOTES
Pt sleeping in room when nurse enters, when awoken pt states that she is not having any chest pain but complains of 9/10 back pain, respirs are easy and unlabored, denies any other needs     Nilda Huynh RN  11/17/17 2183

## 2017-11-17 NOTE — H&P
reviewed and are negative, except as mentioned above  or in HPI. The HeartScore is 4. PAST MEDICAL HISTORY:  She has had vomiting. She has hypothyroidism. She  has had some suicidal thoughts in 2015 and was admitted for that because  she was depressed. History of pneumonia in 2014. History of liver disease  with enlarged liver. Hypertension. Hyperlipidemia. History of blood  clots including a PE twice. History of kidney stones. History of  colonoscopy. Hiatal hernia. Gastroesophageal reflux disease. Diverticulitis of the colon. Diabetes. Depression. COPD. Colon polyps. Cancerous polyps removed from the colon. Bipolar disorder. Arthritis. Allergic rhinitis. PAST SURGICAL HISTORY:  1.  Skin biopsy under left eye in 2006.  2.  Shoulder surgery with a rotator cuff repair in 2004 and 2014 on the  right shoulder. 3.  Mandibular fracture surgery in 1984. 4.  Hysterectomy in 1998.  5.  Elbow surgery on the right in 2004. 6.  Elbow surgery bilaterally with decompression. 7.  Dilation of the esophagus. 8.  Colonoscopy. 9.  Laparoscopic cholecystectomy in 2015. 10.  Carpal tunnel release bilaterally in 2016. 11.  Abdominal surgery. DIAGNOSTIC CARDIAC LAB PROCEDURE:  Showed left ventriculography, ejection  fraction 60%. LVEDP showed moderately impaired relaxation of the  ventricle. Left main is a large vessel, widely patent. Circumflex is a  large vessel with about 25% mid-lesion stenosis. LAD is a large vessel  about 25% very proximal lesion. RCA is a large vessel about 50% lesion. No complications occurred. Continue with aggressive risk factor  modification. PAST FAMILY MEDICAL HISTORY:  Unspecified cancer, heart disease, high blood  pressure, high cholesterol, depression, mental illness, heart attack, early  death, stroke. SOCIAL HISTORY:  Still a smoker at 1 pack a day and has done so for 30  years. Never used smokeless tobacco.  Does not drink alcohol.   Does use  some 5 feet 4 inches tall. GENERAL:  Alert and oriented x3, late middle-aged  female,  apparently in no distress even though she says her chest pain is still  7/10. Well developed, well nourished. Somewhat obese. Again, in no  distress despite the chest pain, she says is 7/10. HEENT:  Normocephalic, atraumatic. NECK:  Supple. Free range of motion. No meningismus or nuchal rigidity. No vertebral tenderness. No JVD noted. Carotid pulses present. No bruits  present. Trachea midline and mobile. No thyromegaly noted. CHEST:  Somewhat barrel-chested. No other deformities or trauma. Equal  excursion, bilateral respiratory effort. No use of accessory muscles of  respiration. No respiratory distress. LUNGS:  Clear to auscultation, except for some crackles in the bases about  a fifth of the way up from the base on both sides. HEART:  PMI is displaced to the left of left midclavicular line slightly  and slightly enlarged. Normal rate, regular rhythm. Normal S1. Normal  S2. Systolic murmur grade 3/6 is heard in the upper right sternal border. Heart sounds are distant due to COPD. No S3. No S4. No murmurs, gallops,  rubs, or clicks heard. ABDOMEN:  Soft. Nondistended. No tenderness. No peritoneal signs. No  mass. Bowel sounds normoactive in all 4 quadrants. BACK:  No spinal tenderness to percussion, although she did have chronic  back pain. Mainly on the lower lumbar right side, she has the pain. No  costovertebral angle tenderness bilaterally. EXTREMITIES:  No cyanosis, clubbing, or edema. Radial pulses are 4+/4+  bilaterally. Pedal pulses are 2+/4+ bilaterally. There is no pedal edema. No ankle edema. No calf tenderness. Negative Homans' sign. No evidence  of DVT. The joints have free range of motion. No deformities or trauma. LYMPHATICS:  She has no cervical lymphadenopathy. NEUROLOGIC:  Alert and oriented x3. GCS 15. Cranial nerves II through XII  are intact.   Motor 0.2 and lipase is up a little bit  at 53.8. TSH is normal at 1.610. Drug screen is negative for everything,  except positive for opiates and positive for cocaine. Negative for  oxycodone. CBC showed white blood cell count 7.9 with a normal  differential.  H and H is normal at 13.7 and 40.0. RBC indices are within  normal limits, except MCV which is at 80.0. RDW is at 16.0, which is above  normal.  There is 1+ anisocytosis and 1+ macrocytosis present on the RBC  morphology. Platelet count is 558. Urine culture pending. Urinalysis showed clear yellow urine, pH 6.5, specific gravity greater than  1.030. There is moderate leukocyte esterase, negative nitrite, negative  protein, negative blood, trace ketones, negative bilirubin, negative  glucose, no casts seen, 5 to 10 white blood cells per high-power field, 3  to 5 red blood cells per high-power field. No renal epithelial cells seen. Few bacteria seen. No yeast seen. No crystals seen. ASSESSMENT AND PLAN:  1. Chest pain. It is kind of unusual and I am sort of surprised since the  last time she used cocaine was Tuesday and her drug screen is positive for  cocaine. You know, cocaine can cause spasms of the coronary arteries and  cause chest pain, so that is not so unusual, but it should have worn off by  now, and I am surprised it is still in the drug screen. She has known  coronary artery disease but minor on the cardiac cath. She still smokes,  which is a risk factor, and she is still a bit obese, which is a risk  factor. A little hyperlipidemic. It is another risk factor. I gave her  aspirin 324 mg once and put her on 162 mg of enteric-coated aspirin daily. Put her on Plavix 75 mg, continue that daily. Continue her isosorbide  dinitrate. Continue her metoprolol tartrate. 2.  COPD. Continue her tiotropium. 3.  Hyperlipidemia. Continue her atorvastatin or Lipitor. 4.  Mild urinary tract infection. Put her on Cipro.   5.  Continue her

## 2017-11-17 NOTE — ED NOTES
Patient given oral hydration and nutrition per Geraldo BARAJAS. Denies other needs. Call light within reach.         Jose Kumar RN  11/17/17 [id number]

## 2017-11-17 NOTE — FLOWSHEET NOTE
11/17/17 1525   Encounter Summary   Services provided to: Patient   Referral/Consult From: [facility] Family members   Place of Church STRZ None   Continue Visiting Yes  (11/17/17 continue support)   Complexity of Encounter Moderate   Length of Encounter 15 minutes   Spiritual Assessment Completed Yes   Routine   Type Initial   Assessment Approachable;Coping   Intervention Nurtured hope;Prayer   Outcome Expressed gratitude   Spiritual/Jain   Type Spiritual support     During encounter with this patient, this staff found patient lying in bed but awake. There were no other family member in the room at this time. Patient said that her sister was in earlier visiting her but had to leave because she was getting tire. Patient also said that she will be going in for hear Cath to get her ready for stent procedure. Patient stated that she has no Hinduism home. She asked for prayer for strength as she go for her stent procedure. Prayer was offered. This staff also offered words of hope and encouragement to patient and provided additional emotional support. Assessment: Patient appeared anxious. Care Plan: It will be helpful for Spiritual Care department to follow up with this patient for post procedure emotional and spiritual support.

## 2017-11-17 NOTE — PROGRESS NOTES
1815 returned to room from cath lab. Report from cath lab personnel. They report pt unable to move , feel rt leg. pt able to wiggle toes and has touch ensation on lt. [address] here, examined pt pt trying to move rt leg and toes but unable. stopped ntg drip  1830 pt can lightly flex rt knee. Slight movement of rt 23rd toe. Does not have touch sensation. Alice Due states will moniter. He will speak to neuro md.  1850 to ct scan per bed. 1910 returned to room after ct scan. Assessment unchanged. 1930 report to oncoming nurse. Viewed ct scan results, no acute findings. Pt still has no sensation of rt leg. Pt cannot move leg.  1950 Alice Due in updated, states aware of ct results. Will plan to start heparin drip.

## 2017-11-18 VITALS
RESPIRATION RATE: 18 BRPM | BODY MASS INDEX: 28.17 KG/M2 | TEMPERATURE: 97.5 F | DIASTOLIC BLOOD PRESSURE: 75 MMHG | HEART RATE: 68 BPM | HEIGHT: 64 IN | OXYGEN SATURATION: 95 % | SYSTOLIC BLOOD PRESSURE: 121 MMHG | WEIGHT: 165 LBS

## 2017-11-18 LAB
ALBUMIN SERPL-MCNC: 3.7 G/DL (ref 3.5–5.1)
ALP BLD-CCNC: 76 U/L (ref 38–126)
ALT SERPL-CCNC: 26 U/L (ref 11–66)
ANION GAP SERPL CALCULATED.3IONS-SCNC: 11 MEQ/L (ref 8–16)
ANISOCYTOSIS: ABNORMAL
APTT: 27 SECONDS (ref 22–38)
AST SERPL-CCNC: 17 U/L (ref 5–40)
BASOPHILS # BLD: 0.4 %
BASOPHILS ABSOLUTE: 0 THOU/MM3 (ref 0–0.1)
BILIRUB SERPL-MCNC: 0.2 MG/DL (ref 0.3–1.2)
BUN BLDV-MCNC: 9 MG/DL (ref 7–22)
CALCIUM SERPL-MCNC: 9.8 MG/DL (ref 8.5–10.5)
CHLORIDE BLD-SCNC: 96 MEQ/L (ref 98–111)
CO2: 29 MEQ/L (ref 23–33)
CREAT SERPL-MCNC: 0.5 MG/DL (ref 0.4–1.2)
EOSINOPHIL # BLD: 0.6 %
EOSINOPHILS ABSOLUTE: 0 THOU/MM3 (ref 0–0.4)
GFR SERPL CREATININE-BSD FRML MDRD: > 90 ML/MIN/1.73M2
GLUCOSE BLD-MCNC: 116 MG/DL (ref 70–108)
GLUCOSE BLD-MCNC: 158 MG/DL (ref 70–108)
GLUCOSE BLD-MCNC: 208 MG/DL (ref 70–108)
HCT VFR BLD CALC: 36.7 % (ref 37–47)
HEMOGLOBIN: 12.2 GM/DL (ref 12–16)
HYPOCHROMIA: ABNORMAL
INR BLD: 0.97 (ref 0.85–1.13)
LYMPHOCYTES # BLD: 44.5 %
LYMPHOCYTES ABSOLUTE: 3 THOU/MM3 (ref 1–4.8)
MAGNESIUM: 1.5 MG/DL (ref 1.6–2.4)
MCH RBC QN AUTO: 26.9 PG (ref 27–31)
MCHC RBC AUTO-ENTMCNC: 33.2 GM/DL (ref 33–37)
MCV RBC AUTO: 81.1 FL (ref 81–99)
MONOCYTES # BLD: 5.5 %
MONOCYTES ABSOLUTE: 0.4 THOU/MM3 (ref 0.4–1.3)
NUCLEATED RED BLOOD CELLS: 0 /100 WBC
ORGANISM: ABNORMAL
PDW BLD-RTO: 16.6 % (ref 11.5–14.5)
PLATELET # BLD: 260 THOU/MM3 (ref 130–400)
PMV BLD AUTO: 7.9 MCM (ref 7.4–10.4)
POTASSIUM SERPL-SCNC: 3.5 MEQ/L (ref 3.5–5.2)
RBC # BLD: 4.52 MILL/MM3 (ref 4.2–5.4)
SEG NEUTROPHILS: 49 %
SEGMENTED NEUTROPHILS ABSOLUTE COUNT: 3.3 THOU/MM3 (ref 1.8–7.7)
SODIUM BLD-SCNC: 136 MEQ/L (ref 135–145)
TOTAL PROTEIN: 5.9 G/DL (ref 6.1–8)
URINE CULTURE REFLEX: ABNORMAL
VRE CULTURE: NORMAL
WBC # BLD: 6.7 THOU/MM3 (ref 4.8–10.8)

## 2017-11-18 PROCEDURE — 82948 REAGENT STRIP/BLOOD GLUCOSE: CPT

## 2017-11-18 PROCEDURE — 6370000000 HC RX 637 (ALT 250 FOR IP): Performed by: INTERNAL MEDICINE

## 2017-11-18 PROCEDURE — 94640 AIRWAY INHALATION TREATMENT: CPT

## 2017-11-18 PROCEDURE — 99239 HOSP IP/OBS DSCHRG MGMT >30: CPT | Performed by: INTERNAL MEDICINE

## 2017-11-18 PROCEDURE — 80053 COMPREHEN METABOLIC PANEL: CPT

## 2017-11-18 PROCEDURE — 83735 ASSAY OF MAGNESIUM: CPT

## 2017-11-18 PROCEDURE — 6360000002 HC RX W HCPCS: Performed by: INTERNAL MEDICINE

## 2017-11-18 PROCEDURE — 6370000000 HC RX 637 (ALT 250 FOR IP): Performed by: ANESTHESIOLOGY

## 2017-11-18 PROCEDURE — 36415 COLL VENOUS BLD VENIPUNCTURE: CPT

## 2017-11-18 PROCEDURE — 85025 COMPLETE CBC W/AUTO DIFF WBC: CPT

## 2017-11-18 PROCEDURE — 85730 THROMBOPLASTIN TIME PARTIAL: CPT

## 2017-11-18 PROCEDURE — 2580000003 HC RX 258: Performed by: INTERNAL MEDICINE

## 2017-11-18 PROCEDURE — 85610 PROTHROMBIN TIME: CPT

## 2017-11-18 RX ORDER — MAGNESIUM SULFATE IN WATER 40 MG/ML
2 INJECTION, SOLUTION INTRAVENOUS ONCE
Status: COMPLETED | OUTPATIENT
Start: 2017-11-18 | End: 2017-11-18

## 2017-11-18 RX ORDER — CARVEDILOL 3.12 MG/1
3.12 TABLET ORAL 2 TIMES DAILY WITH MEALS
Status: DISCONTINUED | OUTPATIENT
Start: 2017-11-18 | End: 2017-11-18 | Stop reason: HOSPADM

## 2017-11-18 RX ORDER — POTASSIUM CHLORIDE 20 MEQ/1
40 TABLET, EXTENDED RELEASE ORAL ONCE
Status: DISCONTINUED | OUTPATIENT
Start: 2017-11-18 | End: 2017-11-18

## 2017-11-18 RX ORDER — ASPIRIN 81 MG/1
81 TABLET ORAL DAILY
Status: DISCONTINUED | OUTPATIENT
Start: 2017-11-18 | End: 2017-11-18 | Stop reason: HOSPADM

## 2017-11-18 RX ORDER — ASPIRIN 81 MG/1
81 TABLET ORAL DAILY
Qty: 30 TABLET | Refills: 0 | Status: SHIPPED | OUTPATIENT
Start: 2017-11-18 | End: 2017-12-07 | Stop reason: SDUPTHER

## 2017-11-18 RX ORDER — HYDROXYZINE HYDROCHLORIDE 10 MG/1
10 TABLET, FILM COATED ORAL 3 TIMES DAILY PRN
Qty: 30 TABLET | Refills: 0 | Status: ON HOLD | OUTPATIENT
Start: 2017-11-18 | End: 2018-01-12

## 2017-11-18 RX ORDER — CARVEDILOL 3.12 MG/1
3.12 TABLET ORAL 2 TIMES DAILY WITH MEALS
Qty: 60 TABLET | Refills: 0 | Status: SHIPPED | OUTPATIENT
Start: 2017-11-18 | End: 2017-12-07 | Stop reason: SDUPTHER

## 2017-11-18 RX ORDER — NICOTINE 21 MG/24HR
1 PATCH, TRANSDERMAL 24 HOURS TRANSDERMAL DAILY
Qty: 14 PATCH | Refills: 0 | Status: ON HOLD | OUTPATIENT
Start: 2017-11-19 | End: 2018-01-11

## 2017-11-18 RX ORDER — HYDROCHLOROTHIAZIDE 25 MG/1
25 TABLET ORAL DAILY
Status: DISCONTINUED | OUTPATIENT
Start: 2017-11-19 | End: 2017-11-18

## 2017-11-18 RX ADMIN — SUCRALFATE 1 G: 1 TABLET ORAL at 08:39

## 2017-11-18 RX ADMIN — HYDROCODONE BITARTRATE AND ACETAMINOPHEN 1 TABLET: 5; 325 TABLET ORAL at 05:31

## 2017-11-18 RX ADMIN — ESCITALOPRAM 20 MG: 20 TABLET, FILM COATED ORAL at 08:39

## 2017-11-18 RX ADMIN — Medication 800 MG: at 10:04

## 2017-11-18 RX ADMIN — ACETAMINOPHEN 650 MG: 325 TABLET ORAL at 03:05

## 2017-11-18 RX ADMIN — Medication 4 UNITS: at 12:38

## 2017-11-18 RX ADMIN — TIOTROPIUM BROMIDE 18 MCG: 18 CAPSULE ORAL; RESPIRATORY (INHALATION) at 08:53

## 2017-11-18 RX ADMIN — CARVEDILOL 3.12 MG: 3.12 TABLET, FILM COATED ORAL at 10:07

## 2017-11-18 RX ADMIN — LISINOPRIL 2.5 MG: 2.5 TABLET ORAL at 08:45

## 2017-11-18 RX ADMIN — ATORVASTATIN CALCIUM 40 MG: 40 TABLET, FILM COATED ORAL at 00:35

## 2017-11-18 RX ADMIN — MAGNESIUM SULFATE HEPTAHYDRATE 2 G: 40 INJECTION, SOLUTION INTRAVENOUS at 08:26

## 2017-11-18 RX ADMIN — SUCRALFATE 1 G: 1 TABLET ORAL at 12:40

## 2017-11-18 RX ADMIN — LEVOTHYROXINE SODIUM 75 MCG: 75 TABLET ORAL at 05:30

## 2017-11-18 RX ADMIN — CYANOCOBALAMIN TAB 1000 MCG 500 MCG: 1000 TAB at 08:42

## 2017-11-18 RX ADMIN — FLUTICASONE PROPIONATE 1 SPRAY: 50 SPRAY, METERED NASAL at 08:46

## 2017-11-18 RX ADMIN — DOXEPIN HYDROCHLORIDE 100 MG: 50 CAPSULE ORAL at 00:34

## 2017-11-18 RX ADMIN — HYDROCHLOROTHIAZIDE 25 MG: 25 TABLET ORAL at 00:35

## 2017-11-18 RX ADMIN — PANTOPRAZOLE SODIUM 40 MG: 40 TABLET, DELAYED RELEASE ORAL at 05:31

## 2017-11-18 RX ADMIN — CLOPIDOGREL 75 MG: 75 TABLET, FILM COATED ORAL at 08:39

## 2017-11-18 RX ADMIN — Medication 10 ML: at 08:40

## 2017-11-18 RX ADMIN — ASPIRIN 81 MG: 81 TABLET ORAL at 08:49

## 2017-11-18 ASSESSMENT — PAIN DESCRIPTION - FREQUENCY: FREQUENCY: CONTINUOUS

## 2017-11-18 ASSESSMENT — PAIN SCALES - GENERAL
PAINLEVEL_OUTOF10: 0
PAINLEVEL_OUTOF10: 8
PAINLEVEL_OUTOF10: 8
PAINLEVEL_OUTOF10: 0

## 2017-11-18 ASSESSMENT — PAIN DESCRIPTION - PAIN TYPE: TYPE: ACUTE PAIN;CHRONIC PAIN

## 2017-11-18 ASSESSMENT — PAIN DESCRIPTION - LOCATION: LOCATION: BACK;GROIN

## 2017-11-18 NOTE — PLAN OF CARE
stable. Comments: Care plan reviewed with patient. Patient verbalize understanding of the plan of care and contribute to goal setting.

## 2017-11-18 NOTE — DISCHARGE INSTR - DIET

## 2017-11-18 NOTE — DISCHARGE SUMMARY
m)   Wt 165 lb (74.8 kg)   SpO2 92%   BMI 28.32 kg/m²   Gen/overall appearance: Not in acute distress. Alert. Head: Normocephalic, atraumatic  Eyes: EOMI, good acuity  ENT:- Oral mucosa moist  Neck: No JVD, thyromegaly  CVS: Nml S1S2, no MRG, RRR  Pulm: Clear bilaterally. No crackles/wheezes  Gastrointestinal: Soft, NT/ND, +BS  Musculoskeletal: No edema. Warm  Neuro: No focal deficit. Moves extremity spontaneously. Psychiatry: Appropriate affect. Not agitated. Skin: Warm, dry with normal turgor. No rash        Significant diagnostic studies that may require follow up:  Xr Chest Standard (2 Vw)    Result Date: 11/16/2017  PROCEDURE: XR CHEST STANDARD TWO VW CLINICAL INFORMATION: chest pain, . COMPARISON: Chest x-ray dated 5/22/2017 TECHNIQUE: PA and lateral views the chest. FINDINGS: Lungs: No pneumonia, pulmonary edema, or obvious mass. Pleura: No pleural effusion. No pneumothorax. HEART: Unremarkable. No cardiomegaly. Mediastinum/edouard: Unremarkable. No obvious mass or adenopathy. Skeleton: Unremarkable. No significant bone or joint abnormality. No acute cardiopulmonary disease. **This report has been created using voice recognition software. It may contain minor errors which are inherent in voice recognition technology. ** Final report electronically signed by Dr. Lilli Mariano on 11/16/2017 9:19 PM    Ct Head Wo Contrast    Result Date: 11/17/2017  PROCEDURE: NONCONTRAST CT BRAIN CLINICAL INFORMATION: FOCAL NEURO DEFICIT, NEW, FIXED OR WORSENING, 3-24 HOURS,. Right leg weakness. COMPARISON: 6/24/2013 TECHNIQUE: Multiple axial 5 mm images of the brain were obtained without administration of intravenous contrast material. ALL CT SCANS AT THIS FACILITY use dose modulation, iterative reconstruction, and/or weight-based dosing when appropriate to reduce radiation dose to as low as reasonably achievable. FINDINGS: Lateral, third, fourth ventricles: Normal in size, contour, position. ..  Parenchyma:  No acute tablet by mouth daily Indications: Gastroesophageal Reflux Disease     SEROQUEL  MG extended release tablet  Generic drug:  QUEtiapine     sucralfate 1 GM tablet  Commonly known as:  CARAFATE     traZODone 50 MG tablet  Commonly known as:  DESYREL     TUDORZA PRESSAIR 400 MCG/ACT Aepb inhaler  Generic drug:  aclidinium     vitamin B-12 500 MCG tablet  Commonly known as:  CYANOCOBALAMIN        STOP taking these medications    hydrochlorothiazide 25 MG tablet  Commonly known as:  HYDRODIURIL     potassium chloride 20 MEQ extended release tablet  Commonly known as:  KLOR-CON M           Where to Get Your Medications      You can get these medications from any pharmacy    Bring a paper prescription for each of these medications  · aspirin 81 MG EC tablet  · carvedilol 3.125 MG tablet  · hydrOXYzine 10 MG tablet  · nicotine 21 MG/24HR         Activity: activity as tolerated  Diet: DIET CARDIAC; Disposition: home  Discharged Condition: Stable  Follow Up:   Deisy Lopez [address] [place] [id number]  [phone]    Schedule an appointment as soon as possible for a visit in 1 week      Josué Maria MD  [address] [place] [id number]  [phone]    Schedule an appointment as soon as possible for a visit in 1 week      Code status:  Full Code     Discharge Medications for PCI/MI (performed or attempted):   - includes aspirin, plavix, BB, statin and ACEi. Did not undergo PCI. Total time spent on discharge, finalizing medications, referrals and arranging outpatient follow up was more than 30 minutes      Thank you Dr. Deisy Lopez CNP for the opportunity to be involved in this patients care.

## 2017-11-18 NOTE — PLAN OF CARE
Problem: Falls - Risk of  Goal: Absence of falls  Outcome: Completed Date Met: 11/18/17  None this shift  - pt aware of safety needs . Problem: Pain:  Goal: Pain level will decrease  Pain level will decrease   Outcome: Ongoing  denies pain at present - right groin cath site tender to touch   Goal: Control of acute pain  Control of acute pain   Outcome: Ongoing  Denies at present   Goal: Control of chronic pain  Control of chronic pain   Outcome: Ongoing  Denies at present     Problem: Discharge Planning:  Goal: Participates in care planning  Participates in care planning   Outcome: Met This Shift  Pt active participant with care planning . Goal: Discharged to appropriate level of care  Discharged to appropriate level of care   Outcome: Met This Shift  Pt discharged today - will be going to [facility] at discharge     Problem: Anxiety/Stress:  Goal: Level of anxiety will decrease  Level of anxiety will decrease   Outcome: Completed Date Met: 11/18/17  Pt calm ,cooperative     Problem:  Bowel Function - Altered:  Goal: Bowel elimination is within specified parameters  Bowel elimination is within specified parameters   Outcome: Completed Date Met: 11/18/17  Bowel function within normal for pt     Problem: Nutrition Deficit:  Goal: Ability to achieve adequate nutritional intake will improve  Ability to achieve adequate nutritional intake will improve   Outcome: Completed Date Met: 11/18/17  Good appetite - tolerating po      Problem: Pain:  Goal: Recognizes and communicates pain  Recognizes and communicates pain   Outcome: Completed Date Met: 53/47/33  duplicate   Goal: Control of acute pain  Control of acute pain   Outcome: Completed Date Met: 58/88/84  Duplicate   Goal: Control of chronic pain  Control of chronic pain   Outcome: Completed Date Met: 42/59/67  Duplicate     Problem: Serum Glucose Level - Abnormal:  Goal: Ability to maintain appropriate glucose levels will improve to within specified parameters  Ability to maintain appropriate glucose levels will improve to within specified parameters   Outcome: Completed Date Met: 11/18/17  glucose with normal limits at this time     Problem: Sleep Pattern Disturbance:  Goal: Appears well-rested  Appears well-rested   Outcome: Completed Date Met: 11/18/17  No sleep disturbance noted     Problem: Tissue Perfusion - Cardiopulmonary, Altered:  Goal: Absence of angina  Absence of angina   Outcome: Completed Date Met: 11/18/17  Denies any chest pain or shortness of breath   Goal: Hemodynamic stability will improve  Hemodynamic stability will improve   Outcome: Ongoing   Stable at present     Comments: .Care plan reviewed with patient and family. Patient and family  verbalize understanding of the plan of care and contribute to goal setting.

## 2017-11-19 LAB — MRSA SCREEN: NORMAL

## 2017-11-20 NOTE — PROCEDURES
[address] [id number]                              CARDIAC CATHETERIZATION    PATIENT NAME: Viola Calvo                  :        1955  MED REC NO:   [id number]                           ROOM:  ACCOUNT NO:   [de-identified]                           ADMIT DATE: 2017  PROVIDER:     Michelle Fulton    DATE OF PROCEDURE:  2017    INDICATION:  Unstable angina with history of multivessel PCI. DESCRIPTION OF PROCEDURE:  After informed consent was obtained from the  patient, she was brought to the cardiac catheterization laboratory and  prepped in sterile fashion. Right femoral artery was chosen as the primary  point of access. Pre-procedure time-out was completed. After infiltration  of the right inguinal region with 2% lidocaine, using a micropuncture  technique, modified Seldinger technique, and fluoroscopic guidance, we  inserted a 6-[place] sheath into the right femoral artery. Standard JL4 and  JR4 diagnostic catheters were used. We performed coronary angiography  across the aortic valve with JR4 catheter. CORONARY ANGIOGRAM:    LEFT MAIN:  The left main is free of any significant disease. LAD:  The LAD has mild luminal irregularities, so it is free of any  significant obstructive disease. There are two small diagonal branches  without any significant obstructive disease. LEFT CIRCUMFLEX:  Left circumflex has mild luminal irregularities, but it  is otherwise free of any significant obstructive disease. Gives rise to  small AV groove branch that bifurcates into a large OM1 branch, but again  it is free of any significant disease. RCA:  The RCA is dominant and has mild luminal irregularities, but is  otherwise free of any significant obstructive disease. LV:  The LV systolic pressure is 236 with left ventricular end diastolic  pressure of 21.   There is no significant LV to AO systolic

## 2017-11-21 ENCOUNTER — TELEPHONE (OUTPATIENT)
Dept: CARDIOLOGY CLINIC | Age: 62
End: 2017-11-21

## 2017-12-04 ENCOUNTER — HOSPITAL ENCOUNTER (EMERGENCY)
Age: 62
Discharge: HOME | End: 2017-12-04
Payer: COMMERCIAL

## 2017-12-04 ENCOUNTER — HOSPITAL ENCOUNTER (OUTPATIENT)
Age: 62
Discharge: HOME | End: 2017-12-04
Payer: COMMERCIAL

## 2017-12-04 ENCOUNTER — HOSPITAL ENCOUNTER (OUTPATIENT)
Dept: GENERAL RADIOLOGY | Age: 62
Discharge: HOME | End: 2017-12-04
Payer: COMMERCIAL

## 2017-12-04 VITALS
OXYGEN SATURATION: 95 % | HEART RATE: 84 BPM | SYSTOLIC BLOOD PRESSURE: 138 MMHG | HEIGHT: 64 IN | BODY MASS INDEX: 28.17 KG/M2 | WEIGHT: 165 LBS | TEMPERATURE: 98.4 F | DIASTOLIC BLOOD PRESSURE: 96 MMHG | RESPIRATION RATE: 16 BRPM

## 2017-12-04 DIAGNOSIS — M54.32 SCIATICA OF LEFT SIDE: ICD-10-CM

## 2017-12-04 DIAGNOSIS — E03.4 ACQUIRED ATROPHY OF THYROID: ICD-10-CM

## 2017-12-04 DIAGNOSIS — M54.50 ACUTE EXACERBATION OF CHRONIC LOW BACK PAIN: Primary | ICD-10-CM

## 2017-12-04 DIAGNOSIS — G89.29 ACUTE EXACERBATION OF CHRONIC LOW BACK PAIN: Primary | ICD-10-CM

## 2017-12-04 LAB
ALBUMIN SERPL-MCNC: 5 G/DL (ref 3.5–5.1)
ALP BLD-CCNC: 103 U/L (ref 38–126)
ALT SERPL-CCNC: 25 U/L (ref 11–66)
ANION GAP SERPL CALCULATED.3IONS-SCNC: 15 MEQ/L (ref 8–16)
AST SERPL-CCNC: 17 U/L (ref 5–40)
BILIRUB SERPL-MCNC: 0.4 MG/DL (ref 0.3–1.2)
BUN BLDV-MCNC: 8 MG/DL (ref 7–22)
CALCIUM SERPL-MCNC: 10.7 MG/DL (ref 8.5–10.5)
CHLORIDE BLD-SCNC: 97 MEQ/L (ref 98–111)
CO2: 28 MEQ/L (ref 23–33)
CREAT SERPL-MCNC: 0.7 MG/DL (ref 0.4–1.2)
GFR SERPL CREATININE-BSD FRML MDRD: 85 ML/MIN/1.73M2
GLUCOSE BLD-MCNC: 118 MG/DL (ref 70–108)
POTASSIUM SERPL-SCNC: 4.4 MEQ/L (ref 3.5–5.2)
SODIUM BLD-SCNC: 140 MEQ/L (ref 135–145)
TOTAL PROTEIN: 8.1 G/DL (ref 6.1–8)
TSH SERPL DL<=0.05 MIU/L-ACNC: 1 UIU/ML (ref 0.4–4.2)

## 2017-12-04 PROCEDURE — 80053 COMPREHEN METABOLIC PANEL: CPT

## 2017-12-04 PROCEDURE — 84443 ASSAY THYROID STIM HORMONE: CPT

## 2017-12-04 PROCEDURE — 86800 THYROGLOBULIN ANTIBODY: CPT

## 2017-12-04 PROCEDURE — 72040 X-RAY EXAM NECK SPINE 2-3 VW: CPT

## 2017-12-04 PROCEDURE — 36415 COLL VENOUS BLD VENIPUNCTURE: CPT

## 2017-12-04 PROCEDURE — 6360000002 HC RX W HCPCS: Performed by: PHYSICIAN ASSISTANT

## 2017-12-04 PROCEDURE — 99282 EMERGENCY DEPT VISIT SF MDM: CPT

## 2017-12-04 PROCEDURE — 96372 THER/PROPH/DIAG INJ SC/IM: CPT

## 2017-12-04 RX ORDER — PREDNISONE 20 MG/1
40 TABLET ORAL DAILY
Qty: 10 TABLET | Refills: 0 | Status: SHIPPED | OUTPATIENT
Start: 2017-12-04 | End: 2017-12-09

## 2017-12-04 RX ORDER — ORPHENADRINE CITRATE 30 MG/ML
60 INJECTION INTRAMUSCULAR; INTRAVENOUS ONCE
Status: COMPLETED | OUTPATIENT
Start: 2017-12-04 | End: 2017-12-04

## 2017-12-04 RX ORDER — ORPHENADRINE CITRATE 100 MG/1
100 TABLET, EXTENDED RELEASE ORAL 2 TIMES DAILY
Qty: 14 TABLET | Refills: 0 | Status: ON HOLD | OUTPATIENT
Start: 2017-12-04 | End: 2018-01-12 | Stop reason: ALTCHOICE

## 2017-12-04 RX ADMIN — ORPHENADRINE CITRATE 60 MG: 30 INJECTION INTRAMUSCULAR; INTRAVENOUS at 16:54

## 2017-12-04 ASSESSMENT — PAIN DESCRIPTION - LOCATION: LOCATION: BACK;LEG

## 2017-12-04 ASSESSMENT — ENCOUNTER SYMPTOMS
RHINORRHEA: 0
BACK PAIN: 1
EYE PAIN: 0
COUGH: 0
VOMITING: 0
DIARRHEA: 0
PHOTOPHOBIA: 0
SORE THROAT: 0
EYE DISCHARGE: 0
WHEEZING: 0
ABDOMINAL PAIN: 0
SHORTNESS OF BREATH: 0
NAUSEA: 0

## 2017-12-04 ASSESSMENT — PAIN DESCRIPTION - ORIENTATION: ORIENTATION: LEFT

## 2017-12-04 ASSESSMENT — PAIN DESCRIPTION - PAIN TYPE: TYPE: ACUTE PAIN

## 2017-12-04 ASSESSMENT — PAIN DESCRIPTION - DESCRIPTORS: DESCRIPTORS: BURNING

## 2017-12-04 ASSESSMENT — PAIN SCALES - GENERAL: PAINLEVEL_OUTOF10: 9

## 2017-12-04 NOTE — ED PROVIDER NOTES
aspirin 81 MG EC tablet Take 1 tablet by mouth daily, Disp-30 tablet, R-0Print      escitalopram (LEXAPRO) 20 MG tablet Take 20 mg by mouth every morningHistorical Med      traZODone (DESYREL) 50 MG tablet Take 50 mg by mouth nightly as needed for SleepHistorical Med      clobetasol (TEMOVATE) 0.05 % cream Apply topically 2 times daily Apply topically to corners of mouth 2 times daily. , Topical, 2 TIMES DAILY, Historical Med      nystatin (NYSTATIN) 558423 UNIT/GM powder Apply topically daily as needed Apply topically 4 times daily. , Topical, DAILY PRN, Historical Med      clopidogrel (PLAVIX) 75 MG tablet Take 1 tablet by mouth daily, Disp-30 tablet, R-3Print      loperamide (LOPERAMIDE A-D) 2 MG tablet Take 2 mg by mouth dailyHistorical Med      sucralfate (CARAFATE) 1 GM tablet Take 1 g by mouth 4 times dailyHistorical Med      isosorbide dinitrate (ISORDIL) 10 MG tablet Take 1 tablet by mouth 3 times daily, Disp-90 tablet, R-3Normal      TUDORZA PRESSAIR 400 MCG/ACT AEPB inhaler 1 puff 2 times daily , R-0, DAWHistorical Med      lisinopril (PRINIVIL;ZESTRIL) 2.5 MG tablet Take 2.5 mg by mouth dailyHistorical Med      fluticasone (FLONASE) 50 MCG/ACT nasal spray 1 spray by Nasal route daily Each nostrilHistorical Med      ferrous sulfate 325 (65 FE) MG tablet Take 325 mg by mouth daily (with breakfast) Historical Med      vitamin B-12 (CYANOCOBALAMIN) 500 MCG tablet Take 500 mcg by mouth dailyHistorical Med      doxepin (SINEQUAN) 100 MG capsule Take 100 mg by mouth nightly Historical Med      atorvastatin (LIPITOR) 40 MG tablet Take 40 mg by mouth nightly Historical Med      metFORMIN (GLUCOPHAGE) 500 MG tablet Take 500 mg by mouth 2 times daily (with meals) Historical Med      QUEtiapine (SEROQUEL XR) 300 MG extended release tablet Take 300 mg by mouth nightly Historical Med      levothyroxine (SYNTHROID) 75 MCG tablet Take 75 mcg by mouth Daily 150 mcg on SundaysHistorical Med      pantoprazole (PROTONIX) 40 MG tablet Take 1 tablet by mouth daily Indications: Gastroesophageal Reflux Disease, Disp-10 tablet, R-0Print      albuterol (PROVENTIL HFA;VENTOLIN HFA) 108 (90 BASE) MCG/ACT inhaler Inhale 2 puffs into the lungs every 6 hours as needed for Wheezing. ALLERGIES     is allergic to seasonal.    FAMILY HISTORY     indicated that her mother is . She indicated that her father is . She indicated that her sister is alive. She indicated that her brother is alive. She indicated that the status of her maternal grandmother is unknown. She indicated that the status of her maternal grandfather is unknown. She indicated that the status of her paternal grandmother is unknown. She indicated that the status of her paternal grandfather is unknown. She indicated that the status of her maternal uncle is unknown.    family history includes Cancer in her father, mother, and sister; Depression in her father; Diabetes in her maternal grandmother; Early Death in her maternal grandfather; Heart Attack in her sister; Heart Disease in her father, maternal grandfather, maternal grandmother, maternal uncle, mother, paternal grandfather, and paternal grandmother; High Blood Pressure in her mother; High Cholesterol in her father, maternal grandfather, maternal grandmother, maternal uncle, mother, paternal grandfather, and paternal grandmother; Mental Illness in her father; Stroke in her maternal grandfather. SOCIAL HISTORY      reports that she has been smoking Cigarettes. She started smoking about 40 years ago. She has a 30.00 pack-year smoking history. She has never used smokeless tobacco. She reports that she uses drugs, including Cocaine, about 1 time per week. She reports that she does not drink alcohol. PHYSICAL EXAM     INITIAL VITALS:  height is 5' 4\" (1.626 m) and weight is 165 lb (74.8 kg). Her oral temperature is 98.4 °F (36.9 °C). Her blood pressure is 138/96 (abnormal) and her pulse is 84.  Her respiration is 16 and oxygen saturation is 95%. Physical Exam   Constitutional: Vital signs are normal. She appears well-developed and well-nourished. She is active and cooperative. Non-toxic appearance. No distress. HENT:   Head: Normocephalic and atraumatic. Right Ear: Hearing normal.   Left Ear: Hearing normal.   Nose: Nose normal. No rhinorrhea. No epistaxis. Mouth/Throat: Uvula is midline, oropharynx is clear and moist and mucous membranes are normal.   Eyes: Conjunctivae and EOM are normal. Pupils are equal, round, and reactive to light. Neck: No JVD present. No neck rigidity. No tracheal deviation present. Cardiovascular: Normal rate and regular rhythm. Murmur heard. Pulses:       Dorsalis pedis pulses are 2+ on the right side, and 2+ on the left side. Posterior tibial pulses are 2+ on the right side, and 2+ on the left side. Pulmonary/Chest: Effort normal and breath sounds normal. No respiratory distress. She has no decreased breath sounds. She has no wheezes. Abdominal: Normal appearance. She exhibits no distension and no pulsatile midline mass. There is no tenderness. There is no rigidity, no rebound, no guarding and no CVA tenderness. No hernia. Musculoskeletal: Normal range of motion. Lumbar back: She exhibits tenderness and bony tenderness. Well perfused; good strength appreciated in all muscle groups; there is good plantar and dorsiflexion of the feet and toes. There is tenderness to the left paraspinal and midline lumbar region. Bilateral SI joint tenderness with left worse than the right. Positive SLR on the left in the modified position. Lymphadenopathy:     She has no cervical adenopathy. Neurological: She is alert. She has normal strength. No sensory deficit. Coordination and gait normal. GCS eye subscore is 4. GCS verbal subscore is 5. GCS motor subscore is 6. There is no saddle anesthesia. Skin: Skin is warm, dry and intact. No rash noted.  She is not diaphoretic. No pallor. Psychiatric: She has a normal mood and affect. Her speech is normal and behavior is normal. Thought content normal. Cognition and memory are normal.   Nursing note and vitals reviewed. DIFFERENTIAL DIAGNOSIS:   Including but not limited to: chronic pain exacerbation, sciatica, lumbar strain, AAA and kidney stone less likely but considered     DIAGNOSTIC RESULTS     EKG: All EKG's are interpreted by the Emergency Department Physician who either signs or Co-signs this chart in the absence of a cardiologist.  None     RADIOLOGY: non-plain film images(s) such as CT, Ultrasound and MRI are read by the radiologist.  Plain radiographic images are visualized and preliminarily interpreted by the emergency physician unless otherwise stated below. No orders to display       LABS:   Labs Reviewed - No data to display    EMERGENCY DEPARTMENT COURSE:   Vitals:    Vitals:    12/04/17 1606   BP: (!) 138/96   Pulse: 84   Resp: 16   Temp: 98.4 °F (36.9 °C)   TempSrc: Oral   SpO2: 95%   Weight: 165 lb (74.8 kg)   Height: 5' 4\" (1.626 m)     The patient was seen and evaluated in the ED for back pain. She presented to the ED in no acute distress. There was tenderness to the left lumbar paraspinal and midline regions with tenderness to the left SI joint. Imaging was not felt necessary at this time as the patient's symptoms are similar to her chronic pain and she has not had any injury prior to the current exacerbation. In addition the patient had a CTA of her abdomen and pelvis on November 16 of this year which showed no evidence for AAA. Patient also had an MRI of her lumbar spine June 29 of this year which showed mild degenerative disc disease. This was discussed with the patient who was amenable with this plan. She was norflex while in the ED with improvement in her symptoms.  The patient was discharged home in stable condition with instructions to follow up with her pain management specialist for

## 2017-12-05 ENCOUNTER — APPOINTMENT (OUTPATIENT)
Dept: GENERAL RADIOLOGY | Age: 62
End: 2017-12-05
Payer: COMMERCIAL

## 2017-12-05 ENCOUNTER — HOSPITAL ENCOUNTER (EMERGENCY)
Age: 62
Discharge: HOME | End: 2017-12-05
Attending: FAMILY MEDICINE
Payer: COMMERCIAL

## 2017-12-05 VITALS
SYSTOLIC BLOOD PRESSURE: 124 MMHG | DIASTOLIC BLOOD PRESSURE: 79 MMHG | RESPIRATION RATE: 20 BRPM | HEIGHT: 64 IN | OXYGEN SATURATION: 94 % | WEIGHT: 165 LBS | HEART RATE: 88 BPM | TEMPERATURE: 97.5 F | BODY MASS INDEX: 28.17 KG/M2

## 2017-12-05 DIAGNOSIS — J40 BRONCHITIS: Primary | ICD-10-CM

## 2017-12-05 DIAGNOSIS — M54.5 CHRONIC MIDLINE LOW BACK PAIN, WITH SCIATICA PRESENCE UNSPECIFIED: ICD-10-CM

## 2017-12-05 DIAGNOSIS — G89.29 CHRONIC MIDLINE LOW BACK PAIN, WITH SCIATICA PRESENCE UNSPECIFIED: ICD-10-CM

## 2017-12-05 LAB
ANION GAP SERPL CALCULATED.3IONS-SCNC: 18 MEQ/L (ref 8–16)
ANISOCYTOSIS: ABNORMAL
BASOPHILS # BLD: 0.4 %
BASOPHILS ABSOLUTE: 0 THOU/MM3 (ref 0–0.1)
BUN BLDV-MCNC: 14 MG/DL (ref 7–22)
CALCIUM SERPL-MCNC: 10.2 MG/DL (ref 8.5–10.5)
CHLORIDE BLD-SCNC: 93 MEQ/L (ref 98–111)
CO2: 21 MEQ/L (ref 23–33)
CREAT SERPL-MCNC: 0.8 MG/DL (ref 0.4–1.2)
EKG ATRIAL RATE: 84 BPM
EKG P AXIS: 42 DEGREES
EKG P-R INTERVAL: 140 MS
EKG Q-T INTERVAL: 406 MS
EKG QRS DURATION: 84 MS
EKG QTC CALCULATION (BAZETT): 479 MS
EKG R AXIS: -2 DEGREES
EKG T AXIS: 43 DEGREES
EKG VENTRICULAR RATE: 84 BPM
EOSINOPHIL # BLD: 0 %
EOSINOPHILS ABSOLUTE: 0 THOU/MM3 (ref 0–0.4)
GFR SERPL CREATININE-BSD FRML MDRD: 73 ML/MIN/1.73M2
GLUCOSE BLD-MCNC: 308 MG/DL (ref 70–108)
HCT VFR BLD CALC: 42.3 % (ref 37–47)
HEMOGLOBIN: 14.1 GM/DL (ref 12–16)
LYMPHOCYTES # BLD: 18.5 %
LYMPHOCYTES ABSOLUTE: 1.8 THOU/MM3 (ref 1–4.8)
MCH RBC QN AUTO: 27.7 PG (ref 27–31)
MCHC RBC AUTO-ENTMCNC: 33.5 GM/DL (ref 33–37)
MCV RBC AUTO: 82.9 FL (ref 81–99)
MONOCYTES # BLD: 2.1 %
MONOCYTES ABSOLUTE: 0.2 THOU/MM3 (ref 0.4–1.3)
NUCLEATED RED BLOOD CELLS: 0 /100 WBC
OSMOLALITY CALCULATION: 276.6 MOSMOL/KG (ref 275–300)
PDW BLD-RTO: 16.3 % (ref 11.5–14.5)
PLATELET # BLD: 334 THOU/MM3 (ref 130–400)
PMV BLD AUTO: 7.8 MCM (ref 7.4–10.4)
POTASSIUM SERPL-SCNC: 4.2 MEQ/L (ref 3.5–5.2)
RBC # BLD: 5.1 MILL/MM3 (ref 4.2–5.4)
SEG NEUTROPHILS: 79 %
SEGMENTED NEUTROPHILS ABSOLUTE COUNT: 7.6 THOU/MM3 (ref 1.8–7.7)
SODIUM BLD-SCNC: 132 MEQ/L (ref 135–145)
WBC # BLD: 9.6 THOU/MM3 (ref 4.8–10.8)

## 2017-12-05 PROCEDURE — 85025 COMPLETE CBC W/AUTO DIFF WBC: CPT

## 2017-12-05 PROCEDURE — 6370000000 HC RX 637 (ALT 250 FOR IP): Performed by: FAMILY MEDICINE

## 2017-12-05 PROCEDURE — 80048 BASIC METABOLIC PNL TOTAL CA: CPT

## 2017-12-05 PROCEDURE — 93005 ELECTROCARDIOGRAM TRACING: CPT

## 2017-12-05 PROCEDURE — 71010 XR CHEST PORTABLE: CPT

## 2017-12-05 PROCEDURE — 36415 COLL VENOUS BLD VENIPUNCTURE: CPT

## 2017-12-05 PROCEDURE — 99285 EMERGENCY DEPT VISIT HI MDM: CPT

## 2017-12-05 PROCEDURE — 94640 AIRWAY INHALATION TREATMENT: CPT

## 2017-12-05 RX ORDER — AMOXICILLIN 500 MG/1
500 CAPSULE ORAL 3 TIMES DAILY
Qty: 30 CAPSULE | Refills: 0 | Status: SHIPPED | OUTPATIENT
Start: 2017-12-05 | End: 2017-12-15

## 2017-12-05 RX ORDER — TRAMADOL HYDROCHLORIDE 50 MG/1
50 TABLET ORAL ONCE
Status: COMPLETED | OUTPATIENT
Start: 2017-12-05 | End: 2017-12-05

## 2017-12-05 RX ORDER — IPRATROPIUM BROMIDE AND ALBUTEROL SULFATE 2.5; .5 MG/3ML; MG/3ML
1 SOLUTION RESPIRATORY (INHALATION) ONCE
Status: COMPLETED | OUTPATIENT
Start: 2017-12-05 | End: 2017-12-05

## 2017-12-05 RX ADMIN — IPRATROPIUM BROMIDE AND ALBUTEROL SULFATE 1 AMPULE: .5; 3 SOLUTION RESPIRATORY (INHALATION) at 17:55

## 2017-12-05 RX ADMIN — TRAMADOL HYDROCHLORIDE 50 MG: 50 TABLET, FILM COATED ORAL at 17:38

## 2017-12-05 ASSESSMENT — ENCOUNTER SYMPTOMS
VOMITING: 0
EYE DISCHARGE: 0
SHORTNESS OF BREATH: 1
ABDOMINAL PAIN: 0
COUGH: 1
NAUSEA: 0
BACK PAIN: 1

## 2017-12-05 ASSESSMENT — PAIN DESCRIPTION - ORIENTATION: ORIENTATION: RIGHT;LEFT

## 2017-12-05 ASSESSMENT — PAIN DESCRIPTION - LOCATION: LOCATION: BACK;LEG

## 2017-12-05 ASSESSMENT — PAIN DESCRIPTION - FREQUENCY: FREQUENCY: CONTINUOUS

## 2017-12-05 ASSESSMENT — PAIN SCALES - GENERAL
PAINLEVEL_OUTOF10: 10
PAINLEVEL_OUTOF10: 10

## 2017-12-05 ASSESSMENT — PAIN DESCRIPTION - PAIN TYPE: TYPE: CHRONIC PAIN

## 2017-12-05 NOTE — ED TRIAGE NOTES
Pt presents to ED with lower back pain. Pt states she had a nerve block in Nov 1st, but needs the other side done as well. Pt states she is waiting on insurance approval. Pt states she is also having a cough x 3 days and SOB. EKG complete. Pt denies CP.

## 2017-12-05 NOTE — ED PROVIDER NOTES
[facility]  eMERGENCY dEPARTMENT eNCOUnter          [address]       Chief Complaint   Patient presents with    Back Pain     radiates down legs    Cough    Shortness of Breath       Nurses Notes reviewed and I agree except as noted in the HPI. HISTORY OF PRESENT ILLNESS    Tello Penny is a 63 y.o. female who presents to the Emergency Department for the evaluation of cough and shortness of breath. Patient states this began 3 days ago. Patient reports her cough has been productive. patient reports associated wheezing. Patient states she smokes approximately a half a pack a day. Patient reports she has been using her inhalers with no relief. Patient denies any chest pain. Patient also reports lower left back pain. Patient states she was seen here yesterday for this complaint and discharged with Prednisone, Norflex, and Voltaren. Patient denies any relief. Patient states the pain radiates down her legs. She is followed by Dr. Dusty Barreto (Pain management) for management of her back pain. The patient had a right sided lumbar nerve ablation on 11/1 and is awaiting insurance approval for an ablation on the left. She denies any new injury or exacerbating factors prior to her current pain exacerbation. The pain is exacerbated with movement and standing. She denies any alleviating factors. The patient has been taking tylenol with little to no improvement in her pain. She denies any incontinence, numbness, tingling, or weakness. Location/Symptom: Shortness of breath, cough, back pain  Timing/Onset: 3 days ago, chronic back pain  Context/Setting: Chronic back pain  Quality: Achy  Duration: Ongoing  Modifying Factors: Back pain worsens with movement  Severity: Moderate    The HPI was provided by the patient. REVIEW OF SYSTEMS     Review of Systems   Constitutional: Negative for chills and fever. HENT: Positive for congestion. Eyes: Negative for discharge.    Respiratory: Positive for All other components within normal limits   BASIC METABOLIC PANEL - Abnormal; Notable for the following:     Sodium 132 (*)     Chloride 93 (*)     CO2 21 (*)     Glucose 308 (*)     All other components within normal limits   ANION GAP - Abnormal; Notable for the following: Anion Gap 18.0 (*)     All other components within normal limits   GLOMERULAR FILTRATION RATE, ESTIMATED - Abnormal; Notable for the following:     Est, Glom Filt Rate 73 (*)     All other components within normal limits   OSMOLALITY       EMERGENCY DEPARTMENT COURSE:   Vitals:    Vitals:    12/05/17 1648 12/05/17 1739 12/05/17 1757   BP: 132/72 124/79    Pulse: 93 88    Resp: 20 20    Temp: 97.5 °F (36.4 °C)     TempSrc: Oral     SpO2: 99% 95% 94%   Weight: 165 lb (74.8 kg)     Height: 5' 4\" (1.626 m)         5:17 PM: The patient was seen and evaluated. Nursing notes reviewed     DuoNeb aerosol    Chest x-ray is negative    Lungs are now clear     Given 1 dose of tramadol for pain    Patient did request pain medicine for home use however I declined to prescribe narcotics. She can follow with her pain doctor for medication    Discharge home        CRITICAL CARE:   None. CONSULTS:  None. PROCEDURES:  None. FINAL IMPRESSION      1. Bronchitis    2.  Chronic midline low back pain, with sciatica presence unspecified          DISPOSITION/PLAN   Treat for bronchitis     Follow-up pain management for her back pain    PATIENT REFERRED TO:  Nano Manzanares [address] [facility]    In 1 week      Sera Stevens [address] [place] [address] A  [facility] [address]  [phone]    Schedule an appointment as soon as possible for a visit   to treat the low back pain      DISCHARGE MEDICATIONS:  New Prescriptions    AMOXICILLIN (AMOXIL) 500 MG CAPSULE    Take 1 capsule by mouth 3 times daily for 10 days       (Please note that portions of this note were completed with a voice recognition program.  Efforts were

## 2017-12-06 LAB — THYROGLOBULIN AB: < 0.9 IU/ML (ref 0–4)

## 2017-12-07 RX ORDER — CARVEDILOL 3.12 MG/1
3.12 TABLET ORAL 2 TIMES DAILY WITH MEALS
Qty: 60 TABLET | Refills: 0 | Status: SHIPPED | OUTPATIENT
Start: 2017-12-07 | End: 2018-01-30 | Stop reason: SDUPTHER

## 2017-12-07 RX ORDER — ASPIRIN 81 MG/1
81 TABLET ORAL DAILY
Qty: 30 TABLET | Refills: 0 | Status: SHIPPED | OUTPATIENT
Start: 2017-12-07 | End: 2018-10-25 | Stop reason: ALTCHOICE

## 2017-12-07 RX ORDER — ISOSORBIDE DINITRATE 10 MG/1
10 TABLET ORAL 3 TIMES DAILY
Qty: 90 TABLET | Refills: 0 | Status: SHIPPED | OUTPATIENT
Start: 2017-12-07 | End: 2018-01-30 | Stop reason: SDUPTHER

## 2017-12-07 NOTE — TELEPHONE ENCOUNTER
Delta Napoleon called requesting a refill on the following medications:  Requested Prescriptions     Pending Prescriptions Disp Refills    carvedilol (COREG) 3.125 MG tablet 60 tablet 0     Sig: Take 1 tablet by mouth 2 times daily (with meals)    isosorbide dinitrate (ISORDIL) 10 MG tablet 90 tablet 3     Sig: Take 1 tablet by mouth 3 times daily    aspirin 81 MG EC tablet 30 tablet 0     Sig: Take 1 tablet by mouth daily     Pharm[facility] verified:  [facility]      Date of last visit:  11/13/2017  Date of next visit (if applicable): 3/6/5642

## 2017-12-13 RX ORDER — CLOPIDOGREL BISULFATE 75 MG/1
75 TABLET ORAL DAILY
Qty: 30 TABLET | Refills: 1 | Status: SHIPPED | OUTPATIENT
Start: 2017-12-13 | End: 2018-02-12 | Stop reason: SDUPTHER

## 2017-12-17 ENCOUNTER — HOSPITAL ENCOUNTER (EMERGENCY)
Age: 62
Discharge: HOME | End: 2017-12-17
Payer: COMMERCIAL

## 2017-12-17 ENCOUNTER — APPOINTMENT (OUTPATIENT)
Dept: CT IMAGING | Age: 62
End: 2017-12-17
Payer: COMMERCIAL

## 2017-12-17 VITALS
SYSTOLIC BLOOD PRESSURE: 132 MMHG | HEIGHT: 64 IN | DIASTOLIC BLOOD PRESSURE: 82 MMHG | OXYGEN SATURATION: 92 % | WEIGHT: 168 LBS | TEMPERATURE: 97.3 F | HEART RATE: 77 BPM | BODY MASS INDEX: 28.68 KG/M2 | RESPIRATION RATE: 16 BRPM

## 2017-12-17 DIAGNOSIS — G44.209 ACUTE NON INTRACTABLE TENSION-TYPE HEADACHE: ICD-10-CM

## 2017-12-17 DIAGNOSIS — J34.89 NOSE PAIN: ICD-10-CM

## 2017-12-17 DIAGNOSIS — R04.0 EPISTAXIS: Primary | ICD-10-CM

## 2017-12-17 LAB
AMPHETAMINE+METHAMPHETAMINE URINE SCREEN: NEGATIVE
ANION GAP SERPL CALCULATED.3IONS-SCNC: 14 MEQ/L (ref 8–16)
ANISOCYTOSIS: ABNORMAL
BACTERIA: ABNORMAL /HPF
BARBITURATE QUANTITATIVE URINE: NEGATIVE
BASOPHILS # BLD: 0.6 %
BASOPHILS ABSOLUTE: 0.1 THOU/MM3 (ref 0–0.1)
BENZODIAZEPINE QUANTITATIVE URINE: NEGATIVE
BILIRUBIN URINE: NEGATIVE
BLOOD, URINE: NEGATIVE
BUN BLDV-MCNC: 9 MG/DL (ref 7–22)
CALCIUM SERPL-MCNC: 10.4 MG/DL (ref 8.5–10.5)
CANNABINOID QUANTITATIVE URINE: NEGATIVE
CASTS 2: ABNORMAL /LPF
CASTS UA: ABNORMAL /LPF
CHARACTER, URINE: CLEAR
CHLORIDE BLD-SCNC: 94 MEQ/L (ref 98–111)
CO2: 29 MEQ/L (ref 23–33)
COCAINE METABOLITE QUANTITATIVE URINE: POSITIVE
COLOR: YELLOW
CREAT SERPL-MCNC: 0.6 MG/DL (ref 0.4–1.2)
CRYSTALS, UA: ABNORMAL
EOSINOPHIL # BLD: 0.4 %
EOSINOPHILS ABSOLUTE: 0 THOU/MM3 (ref 0–0.4)
EPITHELIAL CELLS, UA: ABNORMAL /HPF
GFR SERPL CREATININE-BSD FRML MDRD: > 90 ML/MIN/1.73M2
GLUCOSE BLD-MCNC: 154 MG/DL (ref 70–108)
GLUCOSE URINE: NEGATIVE MG/DL
HCT VFR BLD CALC: 40.4 % (ref 37–47)
HEMOGLOBIN: 13.8 GM/DL (ref 12–16)
KETONES, URINE: NEGATIVE
LEUKOCYTE ESTERASE, URINE: ABNORMAL
LYMPHOCYTES # BLD: 38.9 %
LYMPHOCYTES ABSOLUTE: 3.6 THOU/MM3 (ref 1–4.8)
MCH RBC QN AUTO: 28.1 PG (ref 27–31)
MCHC RBC AUTO-ENTMCNC: 34.2 GM/DL (ref 33–37)
MCV RBC AUTO: 82.2 FL (ref 81–99)
MISCELLANEOUS 2: ABNORMAL
MONOCYTES # BLD: 5.6 %
MONOCYTES ABSOLUTE: 0.5 THOU/MM3 (ref 0.4–1.3)
NITRITE, URINE: NEGATIVE
NUCLEATED RED BLOOD CELLS: 0 /100 WBC
OPIATES, URINE: NEGATIVE
OSMOLALITY CALCULATION: 275.6 MOSMOL/KG (ref 275–300)
OXYCODONE: NEGATIVE
PDW BLD-RTO: 14.9 % (ref 11.5–14.5)
PH UA: 5.5
PHENCYCLIDINE QUANTITATIVE URINE: NEGATIVE
PLATELET # BLD: 205 THOU/MM3 (ref 130–400)
PMV BLD AUTO: 8.1 MCM (ref 7.4–10.4)
POTASSIUM SERPL-SCNC: 3.5 MEQ/L (ref 3.5–5.2)
PROTEIN UA: NEGATIVE
RBC # BLD: 4.91 MILL/MM3 (ref 4.2–5.4)
RBC URINE: ABNORMAL /HPF
RENAL EPITHELIAL, UA: ABNORMAL
SEG NEUTROPHILS: 54.5 %
SEGMENTED NEUTROPHILS ABSOLUTE COUNT: 5.1 THOU/MM3 (ref 1.8–7.7)
SODIUM BLD-SCNC: 137 MEQ/L (ref 135–145)
SPECIFIC GRAVITY, URINE: 1.01 (ref 1–1.03)
UROBILINOGEN, URINE: 0.2 EU/DL
WBC # BLD: 9.3 THOU/MM3 (ref 4.8–10.8)
WBC UA: ABNORMAL /HPF
YEAST: ABNORMAL

## 2017-12-17 PROCEDURE — 99284 EMERGENCY DEPT VISIT MOD MDM: CPT

## 2017-12-17 PROCEDURE — 70496 CT ANGIOGRAPHY HEAD: CPT

## 2017-12-17 PROCEDURE — 6370000000 HC RX 637 (ALT 250 FOR IP): Performed by: PHYSICIAN ASSISTANT

## 2017-12-17 PROCEDURE — 6360000004 HC RX CONTRAST MEDICATION: Performed by: PHYSICIAN ASSISTANT

## 2017-12-17 PROCEDURE — 6360000002 HC RX W HCPCS: Performed by: PHYSICIAN ASSISTANT

## 2017-12-17 PROCEDURE — 87086 URINE CULTURE/COLONY COUNT: CPT

## 2017-12-17 PROCEDURE — 80307 DRUG TEST PRSMV CHEM ANLYZR: CPT

## 2017-12-17 PROCEDURE — 81001 URINALYSIS AUTO W/SCOPE: CPT

## 2017-12-17 PROCEDURE — 36415 COLL VENOUS BLD VENIPUNCTURE: CPT

## 2017-12-17 PROCEDURE — 80048 BASIC METABOLIC PNL TOTAL CA: CPT

## 2017-12-17 PROCEDURE — 70450 CT HEAD/BRAIN W/O DYE: CPT

## 2017-12-17 PROCEDURE — 85025 COMPLETE CBC W/AUTO DIFF WBC: CPT

## 2017-12-17 RX ORDER — ACETAMINOPHEN 500 MG
500 TABLET ORAL EVERY 6 HOURS PRN
Qty: 120 TABLET | Refills: 0 | Status: SHIPPED | OUTPATIENT
Start: 2017-12-17 | End: 2018-07-24 | Stop reason: ALTCHOICE

## 2017-12-17 RX ORDER — HYDROCODONE BITARTRATE AND ACETAMINOPHEN 5; 325 MG/1; MG/1
1 TABLET ORAL ONCE
Status: COMPLETED | OUTPATIENT
Start: 2017-12-17 | End: 2017-12-17

## 2017-12-17 RX ORDER — ONDANSETRON 4 MG/1
4 TABLET, ORALLY DISINTEGRATING ORAL EVERY 8 HOURS PRN
Qty: 20 TABLET | Refills: 0 | Status: SHIPPED | OUTPATIENT
Start: 2017-12-17 | End: 2019-02-21

## 2017-12-17 RX ORDER — ACETAMINOPHEN 500 MG
1000 TABLET ORAL ONCE
Status: COMPLETED | OUTPATIENT
Start: 2017-12-17 | End: 2017-12-17

## 2017-12-17 RX ORDER — ONDANSETRON 4 MG/1
4 TABLET, ORALLY DISINTEGRATING ORAL ONCE
Status: COMPLETED | OUTPATIENT
Start: 2017-12-17 | End: 2017-12-17

## 2017-12-17 RX ADMIN — ONDANSETRON 4 MG: 4 TABLET, ORALLY DISINTEGRATING ORAL at 18:18

## 2017-12-17 RX ADMIN — HYDROCODONE BITARTRATE AND ACETAMINOPHEN 1 TABLET: 5; 325 TABLET ORAL at 18:18

## 2017-12-17 RX ADMIN — IOPAMIDOL 80 ML: 755 INJECTION, SOLUTION INTRAVENOUS at 16:49

## 2017-12-17 RX ADMIN — ACETAMINOPHEN 1000 MG: 500 TABLET ORAL at 15:57

## 2017-12-17 ASSESSMENT — PAIN SCALES - GENERAL
PAINLEVEL_OUTOF10: 5
PAINLEVEL_OUTOF10: 8
PAINLEVEL_OUTOF10: 8
PAINLEVEL_OUTOF10: 10
PAINLEVEL_OUTOF10: 5

## 2017-12-17 ASSESSMENT — ENCOUNTER SYMPTOMS
EYE DISCHARGE: 0
ABDOMINAL PAIN: 0
NAUSEA: 0
RHINORRHEA: 0
COLOR CHANGE: 0
SORE THROAT: 0
BACK PAIN: 0
VOMITING: 0
EYE PAIN: 1
CONSTIPATION: 0
EYE REDNESS: 0
COUGH: 0
PHOTOPHOBIA: 0
SHORTNESS OF BREATH: 0
WHEEZING: 0
DIARRHEA: 0

## 2017-12-17 ASSESSMENT — VISUAL ACUITY
OS: 20/50
OU: 20/40
OD: 20/40

## 2017-12-17 ASSESSMENT — PAIN DESCRIPTION - DESCRIPTORS: DESCRIPTORS: HEADACHE

## 2017-12-17 ASSESSMENT — PAIN DESCRIPTION - ORIENTATION
ORIENTATION: LEFT
ORIENTATION: LEFT

## 2017-12-17 ASSESSMENT — PAIN DESCRIPTION - PAIN TYPE: TYPE: ACUTE PAIN

## 2017-12-17 ASSESSMENT — PAIN DESCRIPTION - LOCATION: LOCATION: NOSE;EYE
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
## 2017-12-17 NOTE — ED NOTES
Patient back from CT, stated, \"head still hurting and she is nauseated\"     Choco Diallo, RN  12/17/17 5656

## 2017-12-17 NOTE — ED PROVIDER NOTES
[address]       Chief Complaint   Patient presents with    Epistaxis       Nurses Notes reviewed and I agree except as noted in the HPI. HISTORY OF PRESENT ILLNESS    Seth Shahid is a 63 y.o. female who presents to the ED for the evaluation of a nosebleed. The patient states that she was walking down her stairs shortly before arrival when she felt a \"pop\" in the left side of her nose. She states that she had immediate pain on the left side of her nose that radiated around the medial side of her left eye. She states that she had a nosebleed but that it has since resolved with application of pressure. She reports continued 8/10 pain and also reports mildly blurry vision in her left eye. She does report having an intermittent headache for the past 2 days but denies any neck pain or stiffness. She denies any dizziness but does report lightheadedness and chills. She denies dizziness, chest pain, SOB, weakness, numbness, tingling, ringing in her ears, or fever. The patient takes aspirin and Plavix but is otherwise anticoagulated. The patient states that she had a nasal fracture that occurred in 1991 and has not had a nosebleed since that time. The patient has no other complaints at this time. REVIEW OF SYSTEMS     Review of Systems   Constitutional: Positive for chills. Negative for fatigue and fever. HENT: Positive for nosebleeds. Negative for congestion, ear pain, rhinorrhea and sore throat. Eyes: Positive for pain (left) and visual disturbance (blurry vision in left eye). Negative for photophobia, discharge and redness. Respiratory: Negative for cough, shortness of breath and wheezing. Cardiovascular: Negative for chest pain and palpitations. Gastrointestinal: Negative for abdominal pain, constipation, diarrhea, nausea and vomiting. Genitourinary: Negative for difficulty urinating, dysuria and vaginal discharge.    Musculoskeletal: Negative for arthralgias, back pain, myalgias, neck pain and neck stiffness. Skin: Negative for color change, pallor and rash. Neurological: Positive for light-headedness and headaches. Negative for dizziness, syncope, weakness and numbness. Hematological: Negative for adenopathy. Psychiatric/Behavioral: Negative for agitation, confusion, dysphoric mood and suicidal ideas. The patient is not nervous/anxious. PAST MEDICAL HISTORY    has a past medical history of Allergic rhinitis; Arthritis; Bipolar disorder ([facility] [address] Cancer ([facility] [address] Colon polyps; COPD (chronic obstructive pulmonary disease) ([facility] [address] Depression; Diabetes ([facility] [address] Diverticulitis of colon; GERD (gastroesophageal reflux disease); Hiatal hernia; History of colonoscopy; History of kidney stones; Hx of blood clots; Hyperlipidemia; Hypertension; Liver disease; Pneumonia; Suicidal thoughts; Thyroid disease; and Vomiting. SURGICAL HISTORY      has a past surgical history that includes Mandible fracture surgery (1984); shoulder surgery (2014); Colonoscopy (2011); Dilatation, esophagus; Elbow surgery (Right, 10/7/2004); Rotator cuff repair (2004, 2014); skin biopsy (2006); Cholecystectomy, laparoscopic (6/12/15); Elbow surgery (Bilateral, 2016); Carpal tunnel release (Bilateral, 2016); Abdomen surgery; and Hysterectomy (1998). CURRENT MEDICATIONS       Previous Medications    ALBUTEROL (PROVENTIL HFA;VENTOLIN HFA) 108 (90 BASE) MCG/ACT INHALER    Inhale 2 puffs into the lungs every 6 hours as needed for Wheezing. ASPIRIN 81 MG EC TABLET    Take 1 tablet by mouth daily    ATORVASTATIN (LIPITOR) 40 MG TABLET    Take 40 mg by mouth nightly     CARVEDILOL (COREG) 3.125 MG TABLET    Take 1 tablet by mouth 2 times daily (with meals)    CLOBETASOL (TEMOVATE) 0.05 % CREAM    Apply topically 2 times daily Apply topically to corners of mouth 2 times daily.     CLOPIDOGREL (PLAVIX) 75 MG TABLET    TAKE 1 TABLET BY MOUTH DAILY    DICLOFENAC (VOLTAREN) 50 MG EC TABLET place, and time. No cranial nerve deficit or sensory deficit. She exhibits normal muscle tone. She displays no seizure activity. Coordination normal. GCS eye subscore is 4. GCS verbal subscore is 5. GCS motor subscore is 6. There were no noted cranial nerve or focal neurologic deficits. Cranial nerves II through XII are grossly intact. Skin: Skin is warm and dry. No rash noted. She is not diaphoretic. No erythema. No pallor. Psychiatric: She has a normal mood and affect. Her behavior is normal. Thought content normal.   Nursing note and vitals reviewed. NIH Stroke Scale  NIH Stroke Scale Assessed: No    DIFFERENTIAL DIAGNOSIS:   Including but not limited to: Epistaxis. Rule out intracranial hemorrhage or CVA. DIAGNOSTIC RESULTS     EKG: All EKG's are interpreted by the Emergency Department Physician who either signs or Co-signs this chart in the absence of a cardiologist.  None    RADIOLOGY: non-plain film images(s) such as CT, Ultrasound and MRI are read by the radiologist.  Plain radiographic images are visualized and preliminarily interpreted by the emergency physician unless otherwise stated below. CTA HEAD W WO CONTRAST         CT HEAD WO CONTRAST   Final Result   1. No acute intracranial hemorrhage or mass effect. **This report has been created using voice recognition software. It may contain minor errors which are inherent in voice recognition technology. **      Final report electronically signed by Dr. Andi Rodriguez on 12/17/2017 5:04 PM          LABS:   Labs Reviewed   CBC WITH AUTO DIFFERENTIAL - Abnormal; Notable for the following:        Result Value    RDW 14.9 (*)     All other components within normal limits   BASIC METABOLIC PANEL - Abnormal; Notable for the following:     Chloride 94 (*)     Glucose 154 (*)     All other components within normal limits   URINE WITH REFLEXED MICRO - Abnormal; Notable for the following:     Leukocyte Esterase, Urine SMALL (*)     All other components within normal limits   URINE CULTURE, REFLEXED    Narrative:     Source: urine, clean catch       Site: clean void          Current Antibiotics: none   URINE DRUG SCREEN   ANION GAP   GLOMERULAR FILTRATION RATE, ESTIMATED   OSMOLALITY       EMERGENCY DEPARTMENT COURSE:   Vitals:    Vitals:    12/17/17 1459   BP: 131/89   Pulse: 86   Resp: 18   Temp: 98.1 °F (36.7 °C)   TempSrc: Oral   SpO2: 95%   Weight: 168 lb (76.2 kg)   Height: 5' 4\" (1.626 m)     The patient was seen and evaluated within the ED today with a nosebleed with a headache as well as left-sided nose and eye pain. Within the department, I observed the patient's vital signs to be within acceptable range. Patient was hypertensive during her stay, but this was believed to be secondary to pain and her history of HTN. She will need to follow up with her PCP for further BP monitoring. On exam, I appreciated tenderness of the left lateral aspect of the patient's nose. There is no active bleeding or blood in the left nasal passage. There is no edema or bruising noted. There is mild tenderness about the left eye. There was no edema or erythema surrounding the left eye. There is no proptosis or surrounding tenderness. There are no signs of preseptal or orbital cellulitis. The conjunctivae are clear without injection. There is no active tearing or drainage from the eye. There is no hyphema. There are no obvious corneal abrasions or ulcerations or retained foreign bodies on visual inspection. There were no noted cranial nerve or focal neurologic deficits. Cranial nerves II through XII are grossly intact. 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 Imaging was otherwise unremarkable. Laboratory work was reassuring. UDS was positive for cocaine. Within the department, the patient was treated with Tylenol followed by Norco for pain. She was also given Zofran for later-reported nausea.  I observed the REFERRED TO:  Yesica Rodrigues, KESHAV  [address]  [phone]    Call in 3 days  As needed, If symptoms worsen    Milana Lainez MD  [address] [facility] [address] [facility] [address] [facility].    Call in 1 day  For nose bleed re-check      DISCHARGE MEDICATIONS:  New Prescriptions    ACETAMINOPHEN (APAP EXTRA STRENGTH) 500 MG TABLET    Take 1 tablet by mouth every 6 hours as needed for Pain    ONDANSETRON (ZOFRAN ODT) 4 MG DISINTEGRATING TABLET    Take 1 tablet by mouth every 8 hours as needed for Nausea       (Please note that portions of this note were completed with a voice recognition program.  Efforts were made to edit the dictations but occasionally words are mis-transcribed.)    Scribe:  Chana Bustamante 12/17/17 3:22 PM Scribing for and in the presence of Kishor Dejesus PA-C. Signed by: Hector Whitfield, 12/17/17 6:02 PM    Provider:  I personally performed the services described in the documentation, reviewed and edited the documentation which was dictated to the scribe in my presence, and it accurately records my words and actions.     Kishor Dejesus PA-C 12/17/17 6:02 PM    MIKEY Lerma PA-C  12/17/17 4641

## 2017-12-19 LAB
ORGANISM: ABNORMAL
URINE CULTURE REFLEX: ABNORMAL

## 2018-01-11 ENCOUNTER — APPOINTMENT (OUTPATIENT)
Dept: GENERAL RADIOLOGY | Age: 63
End: 2018-01-11
Payer: COMMERCIAL

## 2018-01-11 ENCOUNTER — HOSPITAL ENCOUNTER (OUTPATIENT)
Age: 63
Setting detail: OBSERVATION
Discharge: HOME | End: 2018-01-13
Attending: FAMILY MEDICINE | Admitting: OPHTHALMOLOGY
Payer: COMMERCIAL

## 2018-01-11 DIAGNOSIS — R07.89 OTHER CHEST PAIN: Primary | ICD-10-CM

## 2018-01-11 LAB
ALBUMIN SERPL-MCNC: 4.5 G/DL (ref 3.5–5.1)
ALP BLD-CCNC: 103 U/L (ref 38–126)
ALT SERPL-CCNC: 29 U/L (ref 11–66)
AMPHETAMINE+METHAMPHETAMINE URINE SCREEN: NEGATIVE
ANION GAP SERPL CALCULATED.3IONS-SCNC: 17 MEQ/L (ref 8–16)
ANISOCYTOSIS: ABNORMAL
AST SERPL-CCNC: 22 U/L (ref 5–40)
BACTERIA: ABNORMAL /HPF
BARBITURATE QUANTITATIVE URINE: NEGATIVE
BASOPHILS # BLD: 0.5 %
BASOPHILS ABSOLUTE: 0.1 THOU/MM3 (ref 0–0.1)
BENZODIAZEPINE QUANTITATIVE URINE: NEGATIVE
BILIRUB SERPL-MCNC: 0.2 MG/DL (ref 0.3–1.2)
BILIRUBIN URINE: ABNORMAL
BLOOD, URINE: ABNORMAL
BUN BLDV-MCNC: 9 MG/DL (ref 7–22)
CALCIUM SERPL-MCNC: 10.2 MG/DL (ref 8.5–10.5)
CANNABINOID QUANTITATIVE URINE: NEGATIVE
CASTS 2: ABNORMAL /LPF
CASTS UA: ABNORMAL /LPF
CHARACTER, URINE: ABNORMAL
CHLORIDE BLD-SCNC: 97 MEQ/L (ref 98–111)
CO2: 25 MEQ/L (ref 23–33)
COCAINE METABOLITE QUANTITATIVE URINE: POSITIVE
COLOR: YELLOW
CREAT SERPL-MCNC: 0.8 MG/DL (ref 0.4–1.2)
CRYSTALS, UA: ABNORMAL
D-DIMER QUANTITATIVE: < 215 NG/ML FEU (ref 0–500)
EKG ATRIAL RATE: 82 BPM
EKG P AXIS: 45 DEGREES
EKG P-R INTERVAL: 144 MS
EKG Q-T INTERVAL: 402 MS
EKG QRS DURATION: 84 MS
EKG QTC CALCULATION (BAZETT): 469 MS
EKG R AXIS: 8 DEGREES
EKG T AXIS: 43 DEGREES
EKG VENTRICULAR RATE: 82 BPM
EOSINOPHIL # BLD: 0.5 %
EOSINOPHILS ABSOLUTE: 0.1 THOU/MM3 (ref 0–0.4)
EPITHELIAL CELLS, UA: ABNORMAL /HPF
GFR SERPL CREATININE-BSD FRML MDRD: 73 ML/MIN/1.73M2
GLUCOSE BLD-MCNC: 91 MG/DL (ref 70–108)
GLUCOSE URINE: NEGATIVE MG/DL
HCT VFR BLD CALC: 41.5 % (ref 37–47)
HEMOGLOBIN: 14.2 GM/DL (ref 12–16)
ICTOTEST: NEGATIVE
KETONES, URINE: NEGATIVE
LEUKOCYTE ESTERASE, URINE: ABNORMAL
LYMPHOCYTES # BLD: 39.3 %
LYMPHOCYTES ABSOLUTE: 3.9 THOU/MM3 (ref 1–4.8)
MAGNESIUM: 1.3 MG/DL (ref 1.6–2.4)
MCH RBC QN AUTO: 28.6 PG (ref 27–31)
MCHC RBC AUTO-ENTMCNC: 34.1 GM/DL (ref 33–37)
MCV RBC AUTO: 83.8 FL (ref 81–99)
MISCELLANEOUS 2: ABNORMAL
MONOCYTES # BLD: 6.7 %
MONOCYTES ABSOLUTE: 0.7 THOU/MM3 (ref 0.4–1.3)
NITRITE, URINE: NEGATIVE
NUCLEATED RED BLOOD CELLS: 0 /100 WBC
OPIATES, URINE: POSITIVE
OSMOLALITY CALCULATION: 275.8 MOSMOL/KG (ref 275–300)
OXYCODONE: NEGATIVE
PDW BLD-RTO: 15.1 % (ref 11.5–14.5)
PH UA: 6
PHENCYCLIDINE QUANTITATIVE URINE: NEGATIVE
PLATELET # BLD: 246 THOU/MM3 (ref 130–400)
PMV BLD AUTO: 8.2 MCM (ref 7.4–10.4)
POTASSIUM SERPL-SCNC: 3.8 MEQ/L (ref 3.5–5.2)
PRO-BNP: 81.5 PG/ML (ref 0–900)
PROTEIN UA: NEGATIVE
RBC # BLD: 4.95 MILL/MM3 (ref 4.2–5.4)
RBC URINE: ABNORMAL /HPF
RENAL EPITHELIAL, UA: ABNORMAL
SEG NEUTROPHILS: 53 %
SEGMENTED NEUTROPHILS ABSOLUTE COUNT: 5.3 THOU/MM3 (ref 1.8–7.7)
SODIUM BLD-SCNC: 139 MEQ/L (ref 135–145)
SPECIFIC GRAVITY, URINE: 1.02 (ref 1–1.03)
TOTAL PROTEIN: 6.6 G/DL (ref 6.1–8)
TROPONIN T: < 0.01 NG/ML
TROPONIN T: < 0.01 NG/ML
TSH SERPL DL<=0.05 MIU/L-ACNC: 7.6 UIU/ML (ref 0.4–4.2)
UROBILINOGEN, URINE: 0.2 EU/DL
WBC # BLD: 10 THOU/MM3 (ref 4.8–10.8)
WBC UA: ABNORMAL /HPF
YEAST: ABNORMAL

## 2018-01-11 PROCEDURE — 85025 COMPLETE CBC W/AUTO DIFF WBC: CPT

## 2018-01-11 PROCEDURE — 83880 ASSAY OF NATRIURETIC PEPTIDE: CPT

## 2018-01-11 PROCEDURE — G0378 HOSPITAL OBSERVATION PER HR: HCPCS

## 2018-01-11 PROCEDURE — 6370000000 HC RX 637 (ALT 250 FOR IP): Performed by: FAMILY MEDICINE

## 2018-01-11 PROCEDURE — 6360000002 HC RX W HCPCS

## 2018-01-11 PROCEDURE — 84484 ASSAY OF TROPONIN QUANT: CPT

## 2018-01-11 PROCEDURE — 80307 DRUG TEST PRSMV CHEM ANLYZR: CPT

## 2018-01-11 PROCEDURE — 71045 X-RAY EXAM CHEST 1 VIEW: CPT

## 2018-01-11 PROCEDURE — 84439 ASSAY OF FREE THYROXINE: CPT

## 2018-01-11 PROCEDURE — 96374 THER/PROPH/DIAG INJ IV PUSH: CPT

## 2018-01-11 PROCEDURE — 85379 FIBRIN DEGRADATION QUANT: CPT

## 2018-01-11 PROCEDURE — 83735 ASSAY OF MAGNESIUM: CPT

## 2018-01-11 PROCEDURE — 99285 EMERGENCY DEPT VISIT HI MDM: CPT

## 2018-01-11 PROCEDURE — 87086 URINE CULTURE/COLONY COUNT: CPT

## 2018-01-11 PROCEDURE — 84443 ASSAY THYROID STIM HORMONE: CPT

## 2018-01-11 PROCEDURE — 99219 PR INITIAL OBSERVATION CARE/DAY 50 MINUTES: CPT | Performed by: OPHTHALMOLOGY

## 2018-01-11 PROCEDURE — 93005 ELECTROCARDIOGRAM TRACING: CPT

## 2018-01-11 PROCEDURE — 6370000000 HC RX 637 (ALT 250 FOR IP): Performed by: OPHTHALMOLOGY

## 2018-01-11 PROCEDURE — 81001 URINALYSIS AUTO W/SCOPE: CPT

## 2018-01-11 PROCEDURE — 36415 COLL VENOUS BLD VENIPUNCTURE: CPT

## 2018-01-11 PROCEDURE — 80053 COMPREHEN METABOLIC PANEL: CPT

## 2018-01-11 RX ORDER — IBUPROFEN 600 MG/1
600 TABLET ORAL
Status: DISCONTINUED | OUTPATIENT
Start: 2018-01-12 | End: 2018-01-12 | Stop reason: CLARIF

## 2018-01-11 RX ORDER — HYDROXYZINE HYDROCHLORIDE 10 MG/1
10 TABLET, FILM COATED ORAL 3 TIMES DAILY PRN
Status: DISCONTINUED | OUTPATIENT
Start: 2018-01-11 | End: 2018-01-12 | Stop reason: CLARIF

## 2018-01-11 RX ORDER — PROMETHAZINE HYDROCHLORIDE AND CODEINE PHOSPHATE 6.25; 1 MG/5ML; MG/5ML
5 SYRUP ORAL 4 TIMES DAILY PRN
Status: ON HOLD | COMMUNITY
End: 2018-01-12 | Stop reason: ALTCHOICE

## 2018-01-11 RX ORDER — ACETAMINOPHEN 500 MG
500 TABLET ORAL EVERY 6 HOURS PRN
Status: DISCONTINUED | OUTPATIENT
Start: 2018-01-11 | End: 2018-01-13 | Stop reason: HOSPADM

## 2018-01-11 RX ORDER — ONDANSETRON 4 MG/1
4 TABLET, ORALLY DISINTEGRATING ORAL EVERY 8 HOURS PRN
Status: DISCONTINUED | OUTPATIENT
Start: 2018-01-11 | End: 2018-01-13 | Stop reason: HOSPADM

## 2018-01-11 RX ORDER — NICOTINE 21 MG/24HR
1 PATCH, TRANSDERMAL 24 HOURS TRANSDERMAL DAILY
Status: DISCONTINUED | OUTPATIENT
Start: 2018-01-12 | End: 2018-01-13 | Stop reason: HOSPADM

## 2018-01-11 RX ORDER — MORPHINE SULFATE 2 MG/ML
2 INJECTION, SOLUTION INTRAMUSCULAR; INTRAVENOUS ONCE
Status: COMPLETED | OUTPATIENT
Start: 2018-01-11 | End: 2018-01-11

## 2018-01-11 RX ORDER — METOCLOPRAMIDE 10 MG/1
5 TABLET ORAL 3 TIMES DAILY
COMMUNITY
End: 2018-03-14 | Stop reason: ALTCHOICE

## 2018-01-11 RX ORDER — MORPHINE SULFATE 2 MG/ML
2 INJECTION, SOLUTION INTRAMUSCULAR; INTRAVENOUS EVERY 4 HOURS PRN
Status: DISCONTINUED | OUTPATIENT
Start: 2018-01-11 | End: 2018-01-12

## 2018-01-11 RX ORDER — CLOPIDOGREL BISULFATE 75 MG/1
75 TABLET ORAL DAILY
Status: DISCONTINUED | OUTPATIENT
Start: 2018-01-12 | End: 2018-01-13 | Stop reason: HOSPADM

## 2018-01-11 RX ORDER — PANTOPRAZOLE SODIUM 40 MG/1
40 TABLET, DELAYED RELEASE ORAL
Status: DISCONTINUED | OUTPATIENT
Start: 2018-01-12 | End: 2018-01-13 | Stop reason: HOSPADM

## 2018-01-11 RX ORDER — ATORVASTATIN CALCIUM 40 MG/1
40 TABLET, FILM COATED ORAL NIGHTLY
Status: DISCONTINUED | OUTPATIENT
Start: 2018-01-12 | End: 2018-01-13 | Stop reason: HOSPADM

## 2018-01-11 RX ORDER — PREDNISONE 10 MG/1
10 TABLET ORAL DAILY
Status: DISCONTINUED | OUTPATIENT
Start: 2018-01-12 | End: 2018-01-12 | Stop reason: CLARIF

## 2018-01-11 RX ORDER — QUETIAPINE 300 MG/1
300 TABLET, FILM COATED, EXTENDED RELEASE ORAL NIGHTLY
Status: DISCONTINUED | OUTPATIENT
Start: 2018-01-12 | End: 2018-01-13 | Stop reason: HOSPADM

## 2018-01-11 RX ORDER — ORPHENADRINE CITRATE 100 MG/1
100 TABLET, EXTENDED RELEASE ORAL 2 TIMES DAILY
Status: DISCONTINUED | OUTPATIENT
Start: 2018-01-12 | End: 2018-01-12 | Stop reason: CLARIF

## 2018-01-11 RX ORDER — PREDNISONE 10 MG/1
10 TABLET ORAL DAILY
Status: ON HOLD | COMMUNITY
End: 2018-01-12 | Stop reason: ALTCHOICE

## 2018-01-11 RX ORDER — ISOSORBIDE DINITRATE 10 MG/1
10 TABLET ORAL 3 TIMES DAILY
Status: DISCONTINUED | OUTPATIENT
Start: 2018-01-12 | End: 2018-01-13 | Stop reason: HOSPADM

## 2018-01-11 RX ORDER — CARVEDILOL 3.12 MG/1
3.12 TABLET ORAL 2 TIMES DAILY WITH MEALS
Status: DISCONTINUED | OUTPATIENT
Start: 2018-01-12 | End: 2018-01-13 | Stop reason: HOSPADM

## 2018-01-11 RX ORDER — FLUTICASONE PROPIONATE 50 MCG
1 SPRAY, SUSPENSION (ML) NASAL DAILY
Status: DISCONTINUED | OUTPATIENT
Start: 2018-01-12 | End: 2018-01-13 | Stop reason: HOSPADM

## 2018-01-11 RX ORDER — ASPIRIN 81 MG/1
81 TABLET ORAL DAILY
Status: DISCONTINUED | OUTPATIENT
Start: 2018-01-12 | End: 2018-01-13 | Stop reason: HOSPADM

## 2018-01-11 RX ORDER — ONDANSETRON 2 MG/ML
4 INJECTION INTRAMUSCULAR; INTRAVENOUS EVERY 6 HOURS PRN
Status: DISCONTINUED | OUTPATIENT
Start: 2018-01-11 | End: 2018-01-13 | Stop reason: HOSPADM

## 2018-01-11 RX ORDER — TRAZODONE HYDROCHLORIDE 50 MG/1
50 TABLET ORAL NIGHTLY PRN
Status: DISCONTINUED | OUTPATIENT
Start: 2018-01-11 | End: 2018-01-12

## 2018-01-11 RX ORDER — TRAMADOL HYDROCHLORIDE 50 MG/1
50 TABLET ORAL ONCE
Status: COMPLETED | OUTPATIENT
Start: 2018-01-11 | End: 2018-01-11

## 2018-01-11 RX ORDER — PROMETHAZINE HYDROCHLORIDE AND CODEINE PHOSPHATE 6.25; 1 MG/5ML; MG/5ML
5 SYRUP ORAL 4 TIMES DAILY PRN
Status: DISCONTINUED | OUTPATIENT
Start: 2018-01-11 | End: 2018-01-12 | Stop reason: CLARIF

## 2018-01-11 RX ORDER — METOCLOPRAMIDE 10 MG/1
10 TABLET ORAL 4 TIMES DAILY
Status: DISCONTINUED | OUTPATIENT
Start: 2018-01-12 | End: 2018-01-12

## 2018-01-11 RX ORDER — LANOLIN ALCOHOL/MO/W.PET/CERES
500 CREAM (GRAM) TOPICAL DAILY
Status: DISCONTINUED | OUTPATIENT
Start: 2018-01-12 | End: 2018-01-13 | Stop reason: HOSPADM

## 2018-01-11 RX ORDER — ALBUTEROL SULFATE 90 UG/1
2 AEROSOL, METERED RESPIRATORY (INHALATION) EVERY 6 HOURS PRN
Status: DISCONTINUED | OUTPATIENT
Start: 2018-01-11 | End: 2018-01-13 | Stop reason: HOSPADM

## 2018-01-11 RX ORDER — LOPERAMIDE HYDROCHLORIDE 2 MG/1
2 CAPSULE ORAL DAILY
Status: DISCONTINUED | OUTPATIENT
Start: 2018-01-12 | End: 2018-01-12

## 2018-01-11 RX ORDER — LEVOTHYROXINE SODIUM 0.07 MG/1
75 TABLET ORAL DAILY
Status: DISCONTINUED | OUTPATIENT
Start: 2018-01-12 | End: 2018-01-12

## 2018-01-11 RX ORDER — MORPHINE SULFATE 2 MG/ML
INJECTION, SOLUTION INTRAMUSCULAR; INTRAVENOUS
Status: COMPLETED
Start: 2018-01-11 | End: 2018-01-11

## 2018-01-11 RX ORDER — LISINOPRIL 2.5 MG/1
2.5 TABLET ORAL DAILY
Status: DISCONTINUED | OUTPATIENT
Start: 2018-01-12 | End: 2018-01-13 | Stop reason: HOSPADM

## 2018-01-11 RX ORDER — ESCITALOPRAM OXALATE 20 MG/1
20 TABLET ORAL EVERY MORNING
Status: DISCONTINUED | OUTPATIENT
Start: 2018-01-12 | End: 2018-01-13 | Stop reason: HOSPADM

## 2018-01-11 RX ORDER — FERROUS SULFATE 325(65) MG
325 TABLET ORAL
Status: DISCONTINUED | OUTPATIENT
Start: 2018-01-12 | End: 2018-01-13 | Stop reason: HOSPADM

## 2018-01-11 RX ORDER — NITROGLYCERIN 0.4 MG/1
0.4 TABLET SUBLINGUAL EVERY 5 MIN PRN
Status: DISCONTINUED | OUTPATIENT
Start: 2018-01-11 | End: 2018-01-13 | Stop reason: HOSPADM

## 2018-01-11 RX ORDER — ESOMEPRAZOLE MAGNESIUM 20 MG/1
20 FOR SUSPENSION ORAL DAILY
Status: ON HOLD | COMMUNITY
End: 2018-01-11 | Stop reason: ALTCHOICE

## 2018-01-11 RX ORDER — SUCRALFATE 1 G/1
1 TABLET ORAL 4 TIMES DAILY
Status: DISCONTINUED | OUTPATIENT
Start: 2018-01-12 | End: 2018-01-13 | Stop reason: HOSPADM

## 2018-01-11 RX ORDER — DOXEPIN HYDROCHLORIDE 50 MG/1
100 CAPSULE ORAL NIGHTLY
Status: DISCONTINUED | OUTPATIENT
Start: 2018-01-12 | End: 2018-01-12 | Stop reason: CLARIF

## 2018-01-11 RX ORDER — ESOMEPRAZOLE MAGNESIUM 20 MG/1
20 FOR SUSPENSION ORAL DAILY
Status: DISCONTINUED | OUTPATIENT
Start: 2018-01-12 | End: 2018-01-11 | Stop reason: SDUPTHER

## 2018-01-11 RX ADMIN — NITROGLYCERIN 0.4 MG: 0.4 TABLET SUBLINGUAL at 14:16

## 2018-01-11 RX ADMIN — MORPHINE SULFATE 2 MG: 2 INJECTION, SOLUTION INTRAMUSCULAR; INTRAVENOUS at 17:59

## 2018-01-11 RX ADMIN — TRAMADOL HYDROCHLORIDE 50 MG: 50 TABLET, FILM COATED ORAL at 20:24

## 2018-01-11 ASSESSMENT — PAIN DESCRIPTION - ONSET
ONSET: ON-GOING

## 2018-01-11 ASSESSMENT — PAIN DESCRIPTION - LOCATION
LOCATION: CHEST

## 2018-01-11 ASSESSMENT — PAIN DESCRIPTION - PAIN TYPE
TYPE: ACUTE PAIN

## 2018-01-11 ASSESSMENT — PAIN DESCRIPTION - DESCRIPTORS
DESCRIPTORS: PRESSURE

## 2018-01-11 ASSESSMENT — ENCOUNTER SYMPTOMS
APNEA: 0
VOMITING: 0
SHORTNESS OF BREATH: 0
TROUBLE SWALLOWING: 0
COUGH: 0
STRIDOR: 0
WHEEZING: 0
BACK PAIN: 0
SORE THROAT: 0
COLOR CHANGE: 0
ABDOMINAL PAIN: 0
DIARRHEA: 0
CHOKING: 0
CHEST TIGHTNESS: 1
NAUSEA: 0

## 2018-01-11 ASSESSMENT — PAIN SCALES - GENERAL
PAINLEVEL_OUTOF10: 7
PAINLEVEL_OUTOF10: 8
PAINLEVEL_OUTOF10: 7
PAINLEVEL_OUTOF10: 8
PAINLEVEL_OUTOF10: 8
PAINLEVEL_OUTOF10: 7

## 2018-01-11 ASSESSMENT — PAIN DESCRIPTION - ORIENTATION
ORIENTATION: MID
ORIENTATION: MID
ORIENTATION: LEFT
ORIENTATION: MID

## 2018-01-11 ASSESSMENT — PAIN DESCRIPTION - PROGRESSION
CLINICAL_PROGRESSION: NOT CHANGED
CLINICAL_PROGRESSION: GRADUALLY WORSENING
CLINICAL_PROGRESSION: NOT CHANGED
CLINICAL_PROGRESSION: GRADUALLY WORSENING
CLINICAL_PROGRESSION: NOT CHANGED
CLINICAL_PROGRESSION: NOT CHANGED
CLINICAL_PROGRESSION: GRADUALLY WORSENING
CLINICAL_PROGRESSION: NOT CHANGED

## 2018-01-11 ASSESSMENT — PAIN DESCRIPTION - FREQUENCY
FREQUENCY: CONTINUOUS

## 2018-01-11 ASSESSMENT — HEART SCORE: ECG: 0

## 2018-01-11 NOTE — H&P
History & Physical        Patient:  Chio January  YOB: 1955    MRN: [id number]   Acct:  [de-identified]   Primary Care Physician: Jolynn Garcia CNP       Chief Complaint:  Chest pain    History of Present Illness:   History obtained from chart review and the patient. The patient is a 63 y.o. female who presented to [facility] with chest pain. Started today: severe, midsternal, no radiation: no assciated with SOB or diaphoresis: similar to prior attacks. She has history of drug abuse (cocaine): she denies any recent use: she was in retirement and just came out this week (Monday)  She saw her GI doctor today: Plan for EGD in the near future. She was at ENT office today when she was started to have the chest pain. NTG did no help.        Past Medical History:        Diagnosis Date    Allergic rhinitis     Arthritis     spine    Bipolar disorder ([address]     Cancer ([facility] 2011    cancerous polyps removed - Dr. Pablo De La Cruz Colon polyps     COPD (chronic obstructive pulmonary disease) ([address] 7/24/2014    Depression     Diabetes ([facility]     HgA1c 7.2 3/2017 - started Metformin and FSBS <150    Diverticulitis of colon     GERD (gastroesophageal reflux disease)     Hiatal hernia     History of colonoscopy 2002    History of kidney stones     Hx of blood clots 6/17/2014    PE and collar bone area after shoulder surgery    Hyperlipidemia     Hypertension     Liver disease     enlarged liver - damaged with alcohol in past but no cirrhosis per patient    Pneumonia 7/24/2014    Suicidal thoughts     2015 admitted to  [facility]    Thyroid disease     Vomiting        Past Surgical History:        Procedure Laterality Date    ABDOMEN SURGERY      x4 removed cysts and ovary removed    CARPAL TUNNEL RELEASE Bilateral 2016    CHOLECYSTECTOMY, LAPAROSCOPIC  6/12/15    Dr. Lum Meigs, ESOPHAGUS     [address] [place] [address] Right 10/7/2004    Dr. Edwina Morales 22   ALT  29   BILITOT  0.2*   ALKPHOS  103     Lab Results   Component Value Date    AMYLASE 84 11/16/2017     No results for input(s): INR in the last 72 hours. Recent Labs      01/11/18   1428   TROPONINT  < 0.010     No results for input(s): BNP in the last 72 hours. No results for input(s): LACTA in the last 72 hours. No results found for: PROCAL  Lab Results   Component Value Date    LABA1C 7.2 03/22/2017     Lab Results   Component Value Date    TSH 0.999 12/04/2017       Urinalysis:   Lab Results   Component Value Date    NITRU NEGATIVE 01/11/2018    WBCUA 50-75 01/11/2018    WBCUA 0-2 04/19/2012    BACTERIA NONE 01/11/2018    RBCUA 0-2 01/11/2018    BLOODU TRACE 01/11/2018    SPECGRAV 1.026 06/07/2016    GLUCOSEU NEGATIVE 01/11/2018     Radiology:   Reviewed: see report for details  CXR: I have reviewed the report  EKG:  No acute changes:   XR CHEST PORTABLE   Final Result   No acute disease            **This report has been created using voice recognition software. It may contain minor errors which are inherent in voice recognition technology. **      Final report electronically signed by Dr. Luke Boothe on 1/11/2018 2:33 PM        Cardiac Cath: 1/11/17  CORONARY ANGIOGRAM:     LEFT MAIN:  The left main is free of any significant disease.     LAD:  The LAD has mild luminal irregularities, so it is free of any  significant obstructive disease. There are two small diagonal branches  without any significant obstructive disease.     LEFT CIRCUMFLEX:  Left circumflex has mild luminal irregularities, but it  is otherwise free of any significant obstructive disease.   Gives rise to  small AV groove branch that bifurcates into a large OM1 branch, but again  it is free of any significant disease.     RCA:  The RCA is dominant and has mild luminal irregularities, but is  otherwise free of any significant obstructive disease.     LV:  The LV systolic pressure is 298 with left ventricular end diastolic  pressure of

## 2018-01-12 LAB
T4 FREE: 1.22 NG/DL (ref 0.93–1.76)
TROPONIN T: < 0.01 NG/ML
TROPONIN T: < 0.01 NG/ML

## 2018-01-12 PROCEDURE — 96372 THER/PROPH/DIAG INJ SC/IM: CPT

## 2018-01-12 PROCEDURE — G0378 HOSPITAL OBSERVATION PER HR: HCPCS

## 2018-01-12 PROCEDURE — 99225 PR SBSQ OBSERVATION CARE/DAY 25 MINUTES: CPT | Performed by: INTERNAL MEDICINE

## 2018-01-12 PROCEDURE — 36415 COLL VENOUS BLD VENIPUNCTURE: CPT

## 2018-01-12 PROCEDURE — 6370000000 HC RX 637 (ALT 250 FOR IP): Performed by: OPHTHALMOLOGY

## 2018-01-12 PROCEDURE — 94640 AIRWAY INHALATION TREATMENT: CPT

## 2018-01-12 PROCEDURE — 84484 ASSAY OF TROPONIN QUANT: CPT

## 2018-01-12 PROCEDURE — 6360000002 HC RX W HCPCS: Performed by: OPHTHALMOLOGY

## 2018-01-12 PROCEDURE — 96376 TX/PRO/DX INJ SAME DRUG ADON: CPT

## 2018-01-12 RX ORDER — MORPHINE SULFATE 2 MG/ML
2 INJECTION, SOLUTION INTRAMUSCULAR; INTRAVENOUS EVERY 4 HOURS PRN
Status: DISCONTINUED | OUTPATIENT
Start: 2018-01-12 | End: 2018-01-13 | Stop reason: HOSPADM

## 2018-01-12 RX ORDER — HYDROXYZINE PAMOATE 50 MG/1
50 CAPSULE ORAL 3 TIMES DAILY PRN
Status: DISCONTINUED | OUTPATIENT
Start: 2018-01-12 | End: 2018-01-13 | Stop reason: HOSPADM

## 2018-01-12 RX ORDER — LEVOTHYROXINE SODIUM 0.15 MG/1
150 TABLET ORAL
Status: DISCONTINUED | OUTPATIENT
Start: 2018-01-14 | End: 2018-01-13 | Stop reason: HOSPADM

## 2018-01-12 RX ORDER — HYDROXYZINE PAMOATE 50 MG/1
50 CAPSULE ORAL 3 TIMES DAILY PRN
Status: ON HOLD | COMMUNITY
End: 2018-10-31

## 2018-01-12 RX ORDER — DOCUSATE SODIUM 100 MG/1
100 CAPSULE, LIQUID FILLED ORAL 2 TIMES DAILY
Status: DISCONTINUED | OUTPATIENT
Start: 2018-01-12 | End: 2018-01-13 | Stop reason: HOSPADM

## 2018-01-12 RX ORDER — SUCRALFATE ORAL 1 G/10ML
1 SUSPENSION ORAL 4 TIMES DAILY
COMMUNITY
End: 2018-10-25 | Stop reason: ALTCHOICE

## 2018-01-12 RX ORDER — HYDROCHLOROTHIAZIDE 25 MG/1
TABLET ORAL 2 TIMES DAILY
COMMUNITY
End: 2018-08-02 | Stop reason: SDUPTHER

## 2018-01-12 RX ORDER — TRAZODONE HYDROCHLORIDE 100 MG/1
100 TABLET ORAL NIGHTLY PRN
Status: DISCONTINUED | OUTPATIENT
Start: 2018-01-12 | End: 2018-01-13 | Stop reason: HOSPADM

## 2018-01-12 RX ORDER — LOPERAMIDE HYDROCHLORIDE 2 MG/1
2 CAPSULE ORAL DAILY PRN
Status: DISCONTINUED | OUTPATIENT
Start: 2018-01-12 | End: 2018-01-13 | Stop reason: HOSPADM

## 2018-01-12 RX ORDER — LEVOTHYROXINE SODIUM 0.07 MG/1
75 TABLET ORAL
Status: DISCONTINUED | OUTPATIENT
Start: 2018-01-13 | End: 2018-01-13 | Stop reason: HOSPADM

## 2018-01-12 RX ORDER — METOCLOPRAMIDE 10 MG/1
5 TABLET ORAL
Status: DISCONTINUED | OUTPATIENT
Start: 2018-01-12 | End: 2018-01-13 | Stop reason: HOSPADM

## 2018-01-12 RX ADMIN — ENOXAPARIN SODIUM 40 MG: 40 INJECTION SUBCUTANEOUS at 08:58

## 2018-01-12 RX ADMIN — MORPHINE SULFATE 2 MG: 2 INJECTION, SOLUTION INTRAMUSCULAR; INTRAVENOUS at 08:48

## 2018-01-12 RX ADMIN — SUCRALFATE 1 G: 1 TABLET ORAL at 20:41

## 2018-01-12 RX ADMIN — MORPHINE SULFATE 2 MG: 2 INJECTION, SOLUTION INTRAMUSCULAR; INTRAVENOUS at 12:50

## 2018-01-12 RX ADMIN — Medication 2 PUFF: at 11:58

## 2018-01-12 RX ADMIN — MORPHINE SULFATE 2 MG: 2 INJECTION, SOLUTION INTRAMUSCULAR; INTRAVENOUS at 20:40

## 2018-01-12 RX ADMIN — SUCRALFATE 1 G: 1 TABLET ORAL at 18:19

## 2018-01-12 RX ADMIN — MORPHINE SULFATE 2 MG: 2 INJECTION, SOLUTION INTRAMUSCULAR; INTRAVENOUS at 00:55

## 2018-01-12 RX ADMIN — Medication 2 PUFF: at 07:48

## 2018-01-12 RX ADMIN — Medication 2 PUFF: at 17:14

## 2018-01-12 ASSESSMENT — PAIN SCALES - GENERAL
PAINLEVEL_OUTOF10: 5
PAINLEVEL_OUTOF10: 8
PAINLEVEL_OUTOF10: 8
PAINLEVEL_OUTOF10: 4
PAINLEVEL_OUTOF10: 5
PAINLEVEL_OUTOF10: 3
PAINLEVEL_OUTOF10: 8
PAINLEVEL_OUTOF10: 0
PAINLEVEL_OUTOF10: 6

## 2018-01-12 ASSESSMENT — PAIN DESCRIPTION - ORIENTATION: ORIENTATION: LEFT

## 2018-01-12 ASSESSMENT — PAIN DESCRIPTION - LOCATION: LOCATION: CHEST

## 2018-01-12 ASSESSMENT — PAIN DESCRIPTION - PAIN TYPE
TYPE: ACUTE PAIN
TYPE: ACUTE PAIN

## 2018-01-12 NOTE — ED NOTES
Patient to the floor via cart with tech. Vitals stable. No needs voiced at this time.       Rc Arana RN  01/11/18 2035

## 2018-01-12 NOTE — ED NOTES
Patient resting in bed. Alert and oriented with easy respirations. Patient stated the pain was still there. Patient stated the Morphine did not help. Dr. Byers Safe informed and new orders as noted. Updated on plan of care. Side rails up x2. Call light in reach.         Perfecto Lugo, INDY  01/11/18 2017

## 2018-01-12 NOTE — CARE COORDINATION
smokeless tobacco.   Influenza Vaccination Screening Completed: yes  Pneumonia Vaccination Screening Completed: yes  Core measures: vte  PCP: Marissa Contreras CNP  Readmission:   No   Risk Score: 28.75     Discharge Planning  Current Residence:  Group Home  Living Arrangements:  Other (Comment)   Support Systems:  Family Members  Current Services PTA:     Potential Assistance Needed:  N/A  Potential Assistance Purchasing Medications:  No  Does patient want to participate in local refill/ meds to beds program?  N/A  Type of Home Care Services:  Nursing Services  Patient expects to be discharged to:  home   Expected Discharge date:  01/13/18  Follow Up Appointment: Best Day/ Time: Monday PM    Discharge Plan: Return to [facility].       Evaluation: yes

## 2018-01-13 VITALS
BODY MASS INDEX: 29.16 KG/M2 | OXYGEN SATURATION: 95 % | RESPIRATION RATE: 18 BRPM | HEIGHT: 64 IN | HEART RATE: 71 BPM | DIASTOLIC BLOOD PRESSURE: 73 MMHG | TEMPERATURE: 98.7 F | SYSTOLIC BLOOD PRESSURE: 135 MMHG | WEIGHT: 170.8 LBS

## 2018-01-13 LAB
ORGANISM: ABNORMAL
URINE CULTURE REFLEX: ABNORMAL

## 2018-01-13 PROCEDURE — 96374 THER/PROPH/DIAG INJ IV PUSH: CPT

## 2018-01-13 PROCEDURE — 6370000000 HC RX 637 (ALT 250 FOR IP): Performed by: OPHTHALMOLOGY

## 2018-01-13 PROCEDURE — 99217 PR OBSERVATION CARE DISCHARGE MANAGEMENT: CPT | Performed by: INTERNAL MEDICINE

## 2018-01-13 PROCEDURE — 6360000002 HC RX W HCPCS: Performed by: OPHTHALMOLOGY

## 2018-01-13 PROCEDURE — G0378 HOSPITAL OBSERVATION PER HR: HCPCS

## 2018-01-13 PROCEDURE — 96372 THER/PROPH/DIAG INJ SC/IM: CPT

## 2018-01-13 PROCEDURE — 96376 TX/PRO/DX INJ SAME DRUG ADON: CPT

## 2018-01-13 PROCEDURE — 94640 AIRWAY INHALATION TREATMENT: CPT

## 2018-01-13 PROCEDURE — 6370000000 HC RX 637 (ALT 250 FOR IP): Performed by: INTERNAL MEDICINE

## 2018-01-13 RX ORDER — PSEUDOEPHEDRINE HCL 30 MG
100 TABLET ORAL 2 TIMES DAILY
Qty: 30 CAPSULE | Refills: 0 | Status: SHIPPED | OUTPATIENT
Start: 2018-01-13 | End: 2018-04-10 | Stop reason: CLARIF

## 2018-01-13 RX ADMIN — SUCRALFATE 1 G: 1 TABLET ORAL at 09:00

## 2018-01-13 RX ADMIN — ONDANSETRON 4 MG: 4 TABLET, ORALLY DISINTEGRATING ORAL at 00:48

## 2018-01-13 RX ADMIN — MORPHINE SULFATE 2 MG: 2 INJECTION, SOLUTION INTRAMUSCULAR; INTRAVENOUS at 06:34

## 2018-01-13 RX ADMIN — Medication 2 PUFF: at 08:06

## 2018-01-13 RX ADMIN — MORPHINE SULFATE 2 MG: 2 INJECTION, SOLUTION INTRAMUSCULAR; INTRAVENOUS at 14:03

## 2018-01-13 RX ADMIN — CARVEDILOL 3.12 MG: 3.12 TABLET, FILM COATED ORAL at 09:13

## 2018-01-13 RX ADMIN — Medication 2 PUFF: at 14:38

## 2018-01-13 RX ADMIN — MORPHINE SULFATE 2 MG: 2 INJECTION, SOLUTION INTRAMUSCULAR; INTRAVENOUS at 00:51

## 2018-01-13 RX ADMIN — ENOXAPARIN SODIUM 40 MG: 40 INJECTION SUBCUTANEOUS at 09:10

## 2018-01-13 RX ADMIN — LEVOTHYROXINE SODIUM 75 MCG: 75 TABLET ORAL at 06:34

## 2018-01-13 RX ADMIN — SUCRALFATE 1 G: 1 TABLET ORAL at 13:00

## 2018-01-13 RX ADMIN — METOCLOPRAMIDE 5 MG: 10 TABLET ORAL at 06:34

## 2018-01-13 RX ADMIN — PANTOPRAZOLE SODIUM 40 MG: 40 TABLET, DELAYED RELEASE ORAL at 06:34

## 2018-01-13 ASSESSMENT — PAIN SCALES - GENERAL
PAINLEVEL_OUTOF10: 7
PAINLEVEL_OUTOF10: 5
PAINLEVEL_OUTOF10: 5
PAINLEVEL_OUTOF10: 8
PAINLEVEL_OUTOF10: 6
PAINLEVEL_OUTOF10: 8
PAINLEVEL_OUTOF10: 6

## 2018-01-13 NOTE — PROGRESS NOTES
Hospitalist Progress Note    Patient:  Triindad Sheriff      Unit/Bed:6K-10/010-A    YOB: 1955    MRN: [id number]       Acct: [de-identified]     PCP: Ramiro Quigley CNP    Date of Admission: 1/11/2018    Chief Complaint: chest pain    Hospital Course: Pt is a 64 y/o admitted with chest pain. Trops negative, cocaine positive. Counseled. Just got out of nursing home    1/13/18: Pt doing ok, d/c,    Subjective: Pt c/o constipation. Medications:  Reviewed    Infusion Medications    Scheduled Medications    levothyroxine  75 mcg Oral Once per day on Mon Tue Wed Thu Fri Sat    metoclopramide  5 mg Oral TID AC    [START ON 1/14/2018] levothyroxine  150 mcg Oral Once per day on Sun    docusate sodium  100 mg Oral BID    pantoprazole  40 mg Oral QAM AC    QUEtiapine  300 mg Oral Nightly    metFORMIN  500 mg Oral BID WC    ferrous sulfate  325 mg Oral Daily with breakfast    vitamin B-12  500 mcg Oral Daily    atorvastatin  40 mg Oral Nightly    ipratropium  2 puff Inhalation 4x daily    lisinopril  2.5 mg Oral Daily    fluticasone  1 spray Nasal Daily    sucralfate  1 g Oral 4x Daily    escitalopram  20 mg Oral QAM    nicotine  1 patch Transdermal Daily    carvedilol  3.125 mg Oral BID WC    isosorbide dinitrate  10 mg Oral TID    aspirin  81 mg Oral Daily    clopidogrel  75 mg Oral Daily    enoxaparin  40 mg Subcutaneous Daily     PRN Meds: morphine, loperamide, traZODone, hydrOXYzine, nitroGLYCERIN, albuterol sulfate HFA, acetaminophen, ondansetron, ondansetron      Intake/Output Summary (Last 24 hours) at 01/13/18 1603  Last data filed at 01/13/18 1132   Gross per 24 hour   Intake             2085 ml   Output             1100 ml   Net              985 ml       Diet:  DIET CARDIAC;     Exam:  /73   Pulse 71   Temp 98.7 °F (37.1 °C) (Oral)   Resp 18   Ht 5' 4\" (1.626 m)   Wt 170 lb 12.8 oz (77.5 kg)   SpO2 95%   BMI 29.32 kg/m²     General appearance: No apparent distress, appears stated age and cooperative. HEENT:NC/AT. Conjunctivae/corneas clear. Neck: Supple, with full range of motion. No jugular venous distention. Trachea midline. Respiratory:  Clear to auscultation  Cardiovascular: Regular rate and rhythm with normal S1/S2  Abdomen: Soft, non-tender, non-distended with normal bowel sounds. Musculoskeletal:  Full range of motion without deformity. Skin: Skin color, texture, turgor normal.  No rashes or lesions. Neurologic: Grossly non-focal.  Psychiatric: Alert and oriented, thought content appropriate, normal insight  Capillary Refill: Brisk,< 3 seconds   Peripheral Pulses: +2 palpable, equal bilaterally       Labs:   Recent Labs      01/11/18   1355   WBC  10.0   HGB  14.2   HCT  41.5   PLT  246     Recent Labs      01/11/18   1428   NA  139   K  3.8   CL  97*   CO2  25   BUN  9   CREATININE  0.8   CALCIUM  10.2     Recent Labs      01/11/18   1428   AST  22   ALT  29   BILITOT  0.2*   ALKPHOS  103     No results for input(s): INR in the last 72 hours. No results for input(s): Sabino Height in the last 72 hours. Urinalysis:      Lab Results   Component Value Date    NITRU NEGATIVE 01/11/2018    WBCUA 50-75 01/11/2018    WBCUA 0-2 04/19/2012    BACTERIA NONE 01/11/2018    RBCUA 0-2 01/11/2018    BLOODU TRACE 01/11/2018    SPECGRAV 1.026 06/07/2016    GLUCOSEU NEGATIVE 01/11/2018       Radiology:  XR CHEST PORTABLE   Final Result   No acute disease            **This report has been created using voice recognition software. It may contain minor errors which are inherent in voice recognition technology. **      Final report electronically signed by Dr. Jeff Burrell on 1/11/2018 2:33 PM          Diet: DIET CARDIAC;    DVT prophylaxis: [x] Lovenox                                 [] SCDs                                 [] SQ Heparin                                 [] Encourage ambulation           [] Already on Anticoagulation     Disposition:    [x] Home       [] TCU [] Rehab       [] Psych       [] SNF       [] [place]       [] Other-    Code Status: Prior    PT/OT Eval Status:     Assessment/Plan:    Anticipated Discharge in : 1-2 days    Active Hospital Problems    Diagnosis Date Noted    Hypothyroidism [E03.9]     Chest pain [R07.9] 10/06/2015    HTN (hypertension), benign [I10]     Cocaine abuse [F14.10]     [facility] [F31.9]     Tobacco use disorder [F17.200] 07/26/2014    COPD (chronic obstructive pulmonary disease) ([facility] [J44.9] 07/24/2014       1. Counseled. Chest pain likely related to Cocaine. She has had 2 caths within the year  2. Continue synthroid  3. D/c with stool softener  4.  Continue all other mgt        Electronically signed by Primus Buerger, MD on 1/13/2018 at 4:03 PM

## 2018-01-13 NOTE — DISCHARGE SUMMARY
Hospital Medicine Discharge Summary      Patient Identification:   Chio Segal   : 1955  MRN: [id number]   Account: [de-identified]      Patient's PCP: Jolynn Garcia CNP    Admit Date: 2018     Discharge Date: 2018      Admitting Physician: Dallis Olszewski, MD     Discharge Physician: Jamie Dixon MD     Discharge Diagnoses: Active Hospital Problems    Diagnosis Date Noted    Hypothyroidism [E03.9]     Chest pain [R07.9] 10/06/2015    HTN (hypertension), benign [I10]     Cocaine abuse [F14.10]     B[facility] disorder ([facility] [F31.9]     Tobacco use disorder [F17.200] 2014    COPD (chronic obstructive pulmonary disease) ([facility] [J44.9] 2014       The patient was seen and examined on day of discharge and this discharge summary is in conjunction with any daily progress note from day of discharge. Hospital Course:   Chio Segal is a 63 y.o. female admitted to [facility] on 2018 for chest pain. Was found to have cocaine in the system. Pt was stabilized, monitored, symptoms resolved. She has had 2 cardiac caths recently. Pt was counseled. Pt appears motivated  Chronic conditions were monitored and treated  Exam:     Vitals:  Vitals:    18 2022 18 0421 18 0908 18 1132   BP: (!) 144/77 (!) 114/56 126/70 135/73   Pulse: 70 69 78 71   Resp:    Temp: 97.8 °F (36.6 °C) 98.1 °F (36.7 °C) 98.4 °F (36.9 °C) 98.7 °F (37.1 °C)   TempSrc: Oral Oral Oral Oral   SpO2: 95% 94% 93% 95%   Weight:       Height:         Weight: Weight: 170 lb 12.8 oz (77.5 kg)     24 hour intake/output:  Intake/Output Summary (Last 24 hours) at 18 1607  Last data filed at 18 1132   Gross per 24 hour   Intake             2085 ml   Output             1100 ml   Net              985 ml              Labs:  For convenience and continuity at follow-up the following most recent labs are provided:      CBC:    Lab Results   Component Value clobetasol (TEMOVATE) 0.05 % cream  Apply topically 2 times daily to corners of mouth as needed             clopidogrel (PLAVIX) 75 MG tablet  TAKE 1 TABLET BY MOUTH DAILY             docusate sodium (COLACE, DULCOLAX) 100 MG CAPS  Take 100 mg by mouth 2 times daily             escitalopram (LEXAPRO) 20 MG tablet  Take 20 mg by mouth every morning             ferrous sulfate 325 (65 FE) MG tablet  Take 325 mg by mouth daily (with breakfast)              fluticasone (FLONASE) 50 MCG/ACT nasal spray  1 spray by Nasal route daily Each nostril             hydrochlorothiazide (HYDRODIURIL) 25 MG tablet  Take 50 mg by mouth daily             hydrOXYzine (VISTARIL) 50 MG capsule  Take 50 mg by mouth 3 times daily as needed for Anxiety             isosorbide dinitrate (ISORDIL) 10 MG tablet  Take 1 tablet by mouth 3 times daily             levothyroxine (SYNTHROID) 75 MCG tablet  Take 75 mcg by mouth Daily              lisinopril (PRINIVIL;ZESTRIL) 2.5 MG tablet  Take 2.5 mg by mouth daily             loperamide (LOPERAMIDE A-D) 2 MG tablet  Take 2 mg by mouth as needed for Diarrhea              metFORMIN (GLUCOPHAGE) 500 MG tablet  Take 500 mg by mouth 2 times daily (with meals)              metoclopramide (REGLAN) 10 MG tablet  Take 5 mg by mouth 3 times daily              nystatin (NYSTATIN) 848785 UNIT/GM powder  Apply topically 4 times daily as needed              ondansetron (ZOFRAN ODT) 4 MG disintegrating tablet  Take 1 tablet by mouth every 8 hours as needed for Nausea             pantoprazole (PROTONIX) 40 MG tablet  Take 1 tablet by mouth daily Indications: Gastroesophageal Reflux Disease             QUEtiapine (SEROQUEL XR) 300 MG extended release tablet  Take 300 mg by mouth nightly              sucralfate (CARAFATE) 1 GM/10ML suspension  Take 1 g by mouth 4 times daily             traZODone (DESYREL) 100 MG tablet  Take 100 mg by mouth nightly as needed for Sleep              TUDORZA PRESSAIR 400 MCG/ACT AEPB inhaler  1 puff 2 times daily              vitamin B-12 (CYANOCOBALAMIN) 500 MCG tablet  Take 500 mcg by mouth daily                 Time Spent on discharge is more than 35 minutes in the examination, evaluation, counseling and review of medications and discharge plan. Signed: Thank you Ha Duran CNP for the opportunity to be involved in this patient's care.     Electronically signed by Shmuel Abraham MD on 1/13/2018 at 4:07 PM

## 2018-01-13 NOTE — PROGRESS NOTES
Discharge teaching and instructions for diagnosis/procedure of chest pain completed with patient using teachback method. AVS reviewed. Printed prescriptions given to patient. Patient voiced understanding regarding prescriptions, follow up appointments, and care of self at home.  Discharged ambulatory to [facility]  per friend

## 2018-01-15 ENCOUNTER — TELEPHONE (OUTPATIENT)
Dept: ADMINISTRATIVE | Age: 63
End: 2018-01-15

## 2018-01-18 ENCOUNTER — HOSPITAL ENCOUNTER (OUTPATIENT)
Dept: CT IMAGING | Age: 63
Discharge: HOME | End: 2018-01-18
Payer: COMMERCIAL

## 2018-01-18 DIAGNOSIS — R10.33 ABDOMINAL CRAMPING, PERIUMBILICAL: ICD-10-CM

## 2018-01-18 PROCEDURE — 6360000004 HC RX CONTRAST MEDICATION: Performed by: INTERNAL MEDICINE

## 2018-01-18 PROCEDURE — 74177 CT ABD & PELVIS W/CONTRAST: CPT

## 2018-01-18 RX ADMIN — IOPAMIDOL 80 ML: 755 INJECTION, SOLUTION INTRAVENOUS at 13:47

## 2018-01-24 ENCOUNTER — HOSPITAL ENCOUNTER (EMERGENCY)
Age: 63
Discharge: HOME | End: 2018-01-24
Payer: COMMERCIAL

## 2018-01-24 VITALS
BODY MASS INDEX: 29.37 KG/M2 | SYSTOLIC BLOOD PRESSURE: 142 MMHG | OXYGEN SATURATION: 94 % | HEART RATE: 80 BPM | WEIGHT: 172 LBS | DIASTOLIC BLOOD PRESSURE: 90 MMHG | HEIGHT: 64 IN | TEMPERATURE: 98.3 F | RESPIRATION RATE: 20 BRPM

## 2018-01-24 DIAGNOSIS — M54.42 CHRONIC LEFT-SIDED LOW BACK PAIN WITH LEFT-SIDED SCIATICA: Primary | ICD-10-CM

## 2018-01-24 DIAGNOSIS — G89.29 CHRONIC LEFT-SIDED LOW BACK PAIN WITH LEFT-SIDED SCIATICA: Primary | ICD-10-CM

## 2018-01-24 PROCEDURE — 6360000002 HC RX W HCPCS: Performed by: NURSE PRACTITIONER

## 2018-01-24 PROCEDURE — 96374 THER/PROPH/DIAG INJ IV PUSH: CPT

## 2018-01-24 PROCEDURE — 96372 THER/PROPH/DIAG INJ SC/IM: CPT

## 2018-01-24 PROCEDURE — 6370000000 HC RX 637 (ALT 250 FOR IP): Performed by: NURSE PRACTITIONER

## 2018-01-24 PROCEDURE — 99282 EMERGENCY DEPT VISIT SF MDM: CPT

## 2018-01-24 RX ORDER — ORPHENADRINE CITRATE 30 MG/ML
60 INJECTION INTRAMUSCULAR; INTRAVENOUS ONCE
Status: COMPLETED | OUTPATIENT
Start: 2018-01-24 | End: 2018-01-24

## 2018-01-24 RX ORDER — LIDOCAINE 50 MG/G
1 PATCH TOPICAL DAILY
Status: DISCONTINUED | OUTPATIENT
Start: 2018-01-24 | End: 2018-01-24 | Stop reason: HOSPADM

## 2018-01-24 RX ORDER — KETOROLAC TROMETHAMINE 30 MG/ML
30 INJECTION, SOLUTION INTRAMUSCULAR; INTRAVENOUS ONCE
Status: COMPLETED | OUTPATIENT
Start: 2018-01-24 | End: 2018-01-24

## 2018-01-24 RX ADMIN — KETOROLAC TROMETHAMINE 30 MG: 30 INJECTION, SOLUTION INTRAMUSCULAR at 11:56

## 2018-01-24 RX ADMIN — ORPHENADRINE CITRATE 60 MG: 30 INJECTION INTRAMUSCULAR; INTRAVENOUS at 11:56

## 2018-01-24 ASSESSMENT — PAIN DESCRIPTION - LOCATION: LOCATION: BACK

## 2018-01-24 ASSESSMENT — ENCOUNTER SYMPTOMS
PHOTOPHOBIA: 0
BLOOD IN STOOL: 0
EYE REDNESS: 0
SHORTNESS OF BREATH: 0
COLOR CHANGE: 0
ABDOMINAL PAIN: 0
SINUS PRESSURE: 0
VOMITING: 0
CONSTIPATION: 0
DIARRHEA: 0
VOICE CHANGE: 0
NAUSEA: 0
SORE THROAT: 0
COUGH: 0
CHEST TIGHTNESS: 0
ABDOMINAL DISTENTION: 0
RHINORRHEA: 0
WHEEZING: 0
BACK PAIN: 1

## 2018-01-24 ASSESSMENT — PAIN SCALES - GENERAL
PAINLEVEL_OUTOF10: 10
PAINLEVEL_OUTOF10: 10

## 2018-01-24 ASSESSMENT — PAIN DESCRIPTION - PAIN TYPE: TYPE: ACUTE PAIN

## 2018-01-24 ASSESSMENT — PAIN DESCRIPTION - FREQUENCY: FREQUENCY: CONTINUOUS

## 2018-01-24 NOTE — ED PROVIDER NOTES
EXAM     INITIAL VITALS:  height is 5' 4\" (1.626 m) and weight is 172 lb (78 kg). Her oral temperature is 98.3 °F (36.8 °C). Her blood pressure is 142/90 (abnormal) and her pulse is 80. Her respiration is 20 and oxygen saturation is 94%. Physical Exam   Constitutional: She is oriented to person, place, and time. She appears well-developed and well-nourished. HENT:   Head: Normocephalic. Right Ear: External ear normal.   Left Ear: External ear normal.   Mouth/Throat: Uvula is midline and oropharynx is clear and moist.   Eyes: Conjunctivae and EOM are normal. Pupils are equal, round, and reactive to light. Neck: Normal range of motion. Neck supple. Cardiovascular: Normal rate, regular rhythm, S1 normal, S2 normal, normal heart sounds and intact distal pulses. Pulmonary/Chest: Effort normal and breath sounds normal. No respiratory distress. She exhibits no tenderness. Abdominal: Soft. Normal appearance and bowel sounds are normal. She exhibits no distension. There is no tenderness. Musculoskeletal: Normal range of motion. Lumbar back: She exhibits tenderness and bony tenderness (left SI joint tenderness ). Lymphadenopathy:     She has no cervical adenopathy. Neurological: She is alert and oriented to person, place, and time. Skin: Skin is warm, dry and intact. Psychiatric: She has a normal mood and affect. Her speech is normal and behavior is normal. Thought content normal.   Nursing note and vitals reviewed.       DIFFERENTIAL DIAGNOSIS:   Muscle strain, chronic back pain exacerbation     DIAGNOSTIC RESULTS     EKG: All EKG's are interpreted by the Emergency Department Physician who either signs or Co-signs this chart in the absence of a cardiologist.    None     RADIOLOGY: non-plain film images(s) such as CT, Ultrasound and MRI are read by the radiologist.  Plain radiographic images are visualized and preliminarily interpreted by the emergency physician unless otherwise stated below. No orders to display         LABS:   Labs Reviewed - No data to display    EMERGENCY DEPARTMENT COURSE:   Vitals:    Vitals:    01/24/18 1125   BP: (!) 142/90   Pulse: 80   Resp: 20   Temp: 98.3 °F (36.8 °C)   TempSrc: Oral   SpO2: 94%   Weight: 172 lb (78 kg)   Height: 5' 4\" (1.626 m)       MDM    Medications   lidocaine (LIDODERM) 5 % 1 patch (1 patch Transdermal Patch Applied 1/24/18 1156)   ketorolac (TORADOL) injection 30 mg (30 mg Intramuscular Given 1/24/18 1156)   orphenadrine (NORFLEX) injection 60 mg (60 mg Intramuscular Given 1/24/18 1156)         The patient was seen and evaluated in the ED for back pain. She presented to the ED in no acute distress. There was left sided SI joint tenderness on exam, consistent with sciatica. As the patient did not have any injury prior to onset of her pain and the pain is similar to her chronic pain, imaging was not felt necessary at this time. This was discussed with the patient who was amenable with this decision. The patient was given Toradol, Norflex, and a Lidocaine patch while in the ED with improvement in her pain. She was discharged home in stable condition with instructions to follow up with her pain management specialist for further management of her back pain. The patient was amenable with all discharge and follow up instructions. All questions were addressed and answered. Return precautions were given for new or worsening symptoms. Patient was seen independently by myself. The patient's final impression and disposition and plan was determined by myself. CRITICAL CARE:   None    CONSULTS:  None    PROCEDURES:  None    FINAL IMPRESSION      1. Chronic left-sided low back pain with left-sided sciatica          DISPOSITION/PLAN   I have given the patient strict written and verbal instructions about care at home, follow-up, and signs and symptoms of worsening of condition and they did verbalize understanding.      PATIENT REFERRED TO:  Michelle Brenner

## 2018-01-30 ENCOUNTER — OFFICE VISIT (OUTPATIENT)
Dept: ENT CLINIC | Age: 63
End: 2018-01-30
Payer: COMMERCIAL

## 2018-01-30 ENCOUNTER — OFFICE VISIT (OUTPATIENT)
Dept: CARDIOLOGY CLINIC | Age: 63
End: 2018-01-30
Payer: COMMERCIAL

## 2018-01-30 VITALS
HEART RATE: 71 BPM | BODY MASS INDEX: 29.19 KG/M2 | WEIGHT: 171 LBS | HEIGHT: 64 IN | SYSTOLIC BLOOD PRESSURE: 122 MMHG | DIASTOLIC BLOOD PRESSURE: 70 MMHG

## 2018-01-30 VITALS
TEMPERATURE: 97.9 F | BODY MASS INDEX: 29.19 KG/M2 | RESPIRATION RATE: 14 BRPM | HEART RATE: 88 BPM | HEIGHT: 64 IN | WEIGHT: 171 LBS | SYSTOLIC BLOOD PRESSURE: 118 MMHG | DIASTOLIC BLOOD PRESSURE: 66 MMHG

## 2018-01-30 DIAGNOSIS — I10 ESSENTIAL HYPERTENSION: ICD-10-CM

## 2018-01-30 DIAGNOSIS — J34.89 SINUS PRESSURE: ICD-10-CM

## 2018-01-30 DIAGNOSIS — R09.81 NASAL CONGESTION: Primary | ICD-10-CM

## 2018-01-30 DIAGNOSIS — I51.89 DIASTOLIC DYSFUNCTION: ICD-10-CM

## 2018-01-30 DIAGNOSIS — J34.89 NASAL OBSTRUCTION: ICD-10-CM

## 2018-01-30 DIAGNOSIS — J32.9 CHRONIC SINUSITIS, UNSPECIFIED LOCATION: ICD-10-CM

## 2018-01-30 DIAGNOSIS — Z87.898 H/O EPISTAXIS: ICD-10-CM

## 2018-01-30 DIAGNOSIS — R07.89 OTHER CHEST PAIN: Primary | ICD-10-CM

## 2018-01-30 PROCEDURE — 3014F SCREEN MAMMO DOC REV: CPT | Performed by: INTERNAL MEDICINE

## 2018-01-30 PROCEDURE — G8484 FLU IMMUNIZE NO ADMIN: HCPCS | Performed by: PHYSICIAN ASSISTANT

## 2018-01-30 PROCEDURE — 3014F SCREEN MAMMO DOC REV: CPT | Performed by: PHYSICIAN ASSISTANT

## 2018-01-30 PROCEDURE — G8419 CALC BMI OUT NRM PARAM NOF/U: HCPCS | Performed by: INTERNAL MEDICINE

## 2018-01-30 PROCEDURE — 93000 ELECTROCARDIOGRAM COMPLETE: CPT | Performed by: INTERNAL MEDICINE

## 2018-01-30 PROCEDURE — 4004F PT TOBACCO SCREEN RCVD TLK: CPT | Performed by: PHYSICIAN ASSISTANT

## 2018-01-30 PROCEDURE — 3017F COLORECTAL CA SCREEN DOC REV: CPT | Performed by: PHYSICIAN ASSISTANT

## 2018-01-30 PROCEDURE — G8598 ASA/ANTIPLAT THER USED: HCPCS | Performed by: PHYSICIAN ASSISTANT

## 2018-01-30 PROCEDURE — G8419 CALC BMI OUT NRM PARAM NOF/U: HCPCS | Performed by: PHYSICIAN ASSISTANT

## 2018-01-30 PROCEDURE — 4004F PT TOBACCO SCREEN RCVD TLK: CPT | Performed by: INTERNAL MEDICINE

## 2018-01-30 PROCEDURE — 99213 OFFICE O/P EST LOW 20 MIN: CPT | Performed by: INTERNAL MEDICINE

## 2018-01-30 PROCEDURE — G8427 DOCREV CUR MEDS BY ELIG CLIN: HCPCS | Performed by: INTERNAL MEDICINE

## 2018-01-30 PROCEDURE — G8598 ASA/ANTIPLAT THER USED: HCPCS | Performed by: INTERNAL MEDICINE

## 2018-01-30 PROCEDURE — G8484 FLU IMMUNIZE NO ADMIN: HCPCS | Performed by: INTERNAL MEDICINE

## 2018-01-30 PROCEDURE — 99203 OFFICE O/P NEW LOW 30 MIN: CPT | Performed by: PHYSICIAN ASSISTANT

## 2018-01-30 PROCEDURE — G8427 DOCREV CUR MEDS BY ELIG CLIN: HCPCS | Performed by: PHYSICIAN ASSISTANT

## 2018-01-30 PROCEDURE — 3017F COLORECTAL CA SCREEN DOC REV: CPT | Performed by: INTERNAL MEDICINE

## 2018-01-30 RX ORDER — AMOXICILLIN AND CLAVULANATE POTASSIUM 875; 125 MG/1; MG/1
1 TABLET, FILM COATED ORAL 2 TIMES DAILY
Qty: 56 TABLET | Refills: 0 | Status: ON HOLD | OUTPATIENT
Start: 2018-01-30 | End: 2018-02-18 | Stop reason: ALTCHOICE

## 2018-01-30 RX ORDER — CARVEDILOL 3.12 MG/1
3.12 TABLET ORAL 2 TIMES DAILY WITH MEALS
Qty: 180 TABLET | Refills: 1 | Status: SHIPPED | OUTPATIENT
Start: 2018-01-30 | End: 2018-03-14 | Stop reason: SDUPTHER

## 2018-01-30 RX ORDER — ISOSORBIDE DINITRATE 10 MG/1
10 TABLET ORAL 3 TIMES DAILY
Qty: 270 TABLET | Refills: 1 | Status: SHIPPED | OUTPATIENT
Start: 2018-01-30 | End: 2018-03-14 | Stop reason: SDUPTHER

## 2018-01-30 RX ORDER — FLUTICASONE PROPIONATE 50 MCG
1 SPRAY, SUSPENSION (ML) NASAL DAILY
Qty: 1 BOTTLE | Refills: 3 | Status: SHIPPED | OUTPATIENT
Start: 2018-01-30 | End: 2018-10-25 | Stop reason: ALTCHOICE

## 2018-01-30 ASSESSMENT — ENCOUNTER SYMPTOMS
NAUSEA: 1
EYE PAIN: 0
COLOR CHANGE: 0
WHEEZING: 1
BACK PAIN: 1
SINUS PRESSURE: 1
EYE DISCHARGE: 0
EYE REDNESS: 0
COUGH: 1
CONSTIPATION: 1
RECTAL PAIN: 0
STRIDOR: 0
EYE ITCHING: 0
VOMITING: 1
VOICE CHANGE: 0
ANAL BLEEDING: 0
ABDOMINAL PAIN: 1
SORE THROAT: 1
TROUBLE SWALLOWING: 1
BLOOD IN STOOL: 0
ABDOMINAL DISTENTION: 0
CHOKING: 0
DIARRHEA: 1
FACIAL SWELLING: 0
CHEST TIGHTNESS: 1
PHOTOPHOBIA: 0
SINUS PAIN: 1
APNEA: 0
RHINORRHEA: 1
SHORTNESS OF BREATH: 1

## 2018-01-30 NOTE — PROGRESS NOTES
clopidogrel (PLAVIX) 75 MG tablet TAKE 1 TABLET BY MOUTH DAILY 30 tablet 1    carvedilol (COREG) 3.125 MG tablet Take 1 tablet by mouth 2 times daily (with meals) 60 tablet 0    isosorbide dinitrate (ISORDIL) 10 MG tablet Take 1 tablet by mouth 3 times daily 90 tablet 0    aspirin 81 MG EC tablet Take 1 tablet by mouth daily 30 tablet 0    escitalopram (LEXAPRO) 20 MG tablet Take 20 mg by mouth every morning      traZODone (DESYREL) 100 MG tablet Take 100 mg by mouth nightly as needed for Sleep       clobetasol (TEMOVATE) 0.05 % cream Apply topically 2 times daily to corners of mouth as needed      nystatin (NYSTATIN) 053815 UNIT/GM powder Apply topically 4 times daily as needed       loperamide (LOPERAMIDE A-D) 2 MG tablet Take 2 mg by mouth as needed for Diarrhea       TUDORZA PRESSAIR 400 MCG/ACT AEPB inhaler 1 puff 2 times daily   0    lisinopril (PRINIVIL;ZESTRIL) 2.5 MG tablet Take 2.5 mg by mouth daily      fluticasone (FLONASE) 50 MCG/ACT nasal spray 1 spray by Nasal route daily Each nostril      ferrous sulfate 325 (65 FE) MG tablet Take 325 mg by mouth daily (with breakfast)       vitamin B-12 (CYANOCOBALAMIN) 500 MCG tablet Take 500 mcg by mouth daily      atorvastatin (LIPITOR) 40 MG tablet Take 40 mg by mouth nightly       metFORMIN (GLUCOPHAGE) 500 MG tablet Take 500 mg by mouth 2 times daily (with meals)       QUEtiapine (SEROQUEL XR) 300 MG extended release tablet Take 300 mg by mouth nightly       levothyroxine (SYNTHROID) 75 MCG tablet Take 75 mcg by mouth Daily       pantoprazole (PROTONIX) 40 MG tablet Take 1 tablet by mouth daily Indications: Gastroesophageal Reflux Disease 10 tablet 0    albuterol (PROVENTIL HFA;VENTOLIN HFA) 108 (90 BASE) MCG/ACT inhaler Inhale 2 puffs into the lungs every 6 hours as needed for Wheezing. No current facility-administered medications for this visit.         REVIEW OF SYSTEM  Constitutional  symptoms such as fever chills and malaise and weakness  Are negative   HE ENT negative  HEART all negative  LUNGS all negative   ABDOMEN all negative  GENITAL URINARY all within normal limits  NEUROLOGY all negative  NECK all negative   SKIN all negative  MUSCULOSKELETAL negative  HEMATOLOGICAL negative  PSYCHIATRIC  negative    Vitals:    01/30/18 1128   BP: 122/70   Pulse: 71   Weight: 171 lb (77.6 kg)   Height: 5' 4\" (1.626 m)       EXAMINATION   Gen.  Appearance is reflective of nutritional normal nutrition and well-groomed   Appears  stated age    Carotid the amplitude of  carotid artery  And up stroke are present or diminished and bruits are absent   Thyroid palpation appears to be  Normal    MADHU  And evaluated and within normal limits  And size of pupils is normal  HEENT  teeth appears to be symmetrical without poor dentition and gums  and palate appears to be healthy and  head is normal cephalic  Without signs of trauma and eyes are without trauma no conjunctiva and Iids retracting and not retracted ptosis and no ptosis and without  erythematous changes and  Nose is symmetrical  without drainage  and ears are  without tinnitus  and throat is clear   JUGULAR VEINS  are not elevated and tongue is mid line no masses presence and no paula A waves present   LYMPHATICS are without adenopathies at least on exam   CHEST  No biventricular heaves and thrills and no right ventricular heaves and examination without signs of trauma and lesions  HEART not distant and regular rate and rhythm without   gallop signs with murmurs and systolic crescendo  Decrescendo  4/6 normal s1 and s2 sounds and no s3 and s4 split   Point of maximum intensity of the heartbeat  is at or displaced from the fifth intercostal space  LUNGS no   presence of intercostal retractions and no  use of the accessory muscles with a diaphragmatic movement present and not present pectus and pigeon chest and without wheezes and rhonchi and non diminished in all posterior lungs  and without rales  ABDOMEN abdominal bruit not present  And  No  Presency of  morbid obesity and with no    non distended without organomegaly and no rebound tenderness and bowel sound are present  all quadrants   NEUROLOGY all the cranial nerves are intact and no motor deficits  and no sensory deficits  MUSCULAR SKELETAL without signs of trauma and kyphosis and scoliosis and normal range of motion for all extremities  INTEGUMENTARY without tenting and skin rashes indentation and  dryness  NECK without lymph adenopathy   NEUROPSYCHIATRIC has no anxiety, no mood swings and depression  VASCULAR EXAM for carotid ,radial femoral arteries are  steady  and not diminished   Extremities no evidence of peripheral edema  And pulses are  normal    Assessment and plans    1. Other chest pain cath showed mild cad  EKG 12 Lead   2. Essential hypertension  EKG 12 Lead   3. Diastolic dysfunction         Follow dr Nancy Parekh as he did  Cardiac cath on her    patient was advised of the side effects of the medications and watch for any change in medical status if any of those side effects develop to call immediately and may consider  discontinuing the medicine after talking to us and  depending on the clinical scenario      We Re assured  And educated the  patient  On their  medical status  And the treatment plans  And the patient  is willing  to be complaint with care  And follow up and  All their question  answered to their  satisfaction    Patient is a advised to have their lipids and liver panel and TSH checked through their family doctors and the therapeutic values that need to be met are also presented to the patient and need to achieve them is emphasized and patient agrees and accepts.     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     Electronically signed by Alexy Joseph DO on 1/30/2018 at 12:19 PM

## 2018-01-30 NOTE — PROGRESS NOTES
esophagitis    Hypokalemia    HTN (hypertension), benign    Bipolar 1 disorder (HCC)    Chronic pain    Hiatal hernia    Chest pain    Vomiting    Erosive gastritis    H pylori ulcer    Hematemesis    History of Helicobacter pylori infection    Intractable abdominal pain    Intractable vomiting with nausea    Epigastric abdominal pain    Acute blood loss anemia    Chronic chest pain    Dehydration    Hyponatremia    Metabolic acidosis    Essential hypertension    Chronic obstructive pulmonary disease (HCC)    Hypothyroidism    History of DVT (deep vein thrombosis)    Depression    Tobacco abuse    Hematemesis with nausea    Hypoxia    Pleural effusion, bilateral    Suicidal ideation    Bipolar disorder, in partial remission, most recent episode depressed (HCC)    Bipolar II disorder ([address]    Diastolic dysfunction    Angina, class II (HCC)    Dyslipidemia     Allergies   Allergen Reactions    Seasonal Other (See Comments)     sneezing     Past Medical History:   Diagnosis Date    Allergic rhinitis     Arthritis     spine    Bipolar disorder ([address]     Cancer ([address] 2011    cancerous polyps removed - Dr. Josh Burleson Colon polyps     COPD (chronic obstructive [facility] disease) [facility] 7/24/2014    Depression     Diabetes ([facility]     HgA1c 7.2 3/2017 - started Metformin and FSBS <150    Diverticulitis of colon     GERD (gastroesophageal reflux disease)     Hiatal hernia     History of colonoscopy 2002    History of kidney stones     Hx of blood clots 6/17/2014    PE and collar bone area after shoulder surgery    Hyperlipidemia     Hypertension     Liver disease     enlarged liver - damaged with alcohol in past but no cirrhosis per patient    Pneumonia 7/24/2014    Suicidal thoughts     2015 admitted to  from [facility]    Thyroid disease     Vomiting      Past Surgical History:   Procedure Laterality Date    ABDOMEN SURGERY      x4 removed cysts and ovary removed    CARDIAC SURGERY      heart caths, no stents    CARPAL TUNNEL RELEASE Bilateral 2016    CHOLECYSTECTOMY, LAPAROSCOPIC  6/12/15    Dr. Lennox Conine, ESOPHAGUS      ELBOW SURGERY Right 10/7/2004    Dr. Sukhjinder Elena Bilateral 2016    decompression   Sarita Du    Dr. Jeramy Montesinos [facility]    ROTATOR CUFF REPAIR  2004, 2014    right shoulder    SHOULDER SURGERY  2014    right    SKIN BIOPSY  2006    under left eye         Subjective:      Review of Systems   Constitutional: Positive for fatigue. Negative for activity change, appetite change, chills, diaphoresis, fever and unexpected weight change. HENT: Positive for congestion, mouth sores, nosebleeds, rhinorrhea, sinus pain, sinus pressure, sneezing, sore throat, tinnitus and trouble swallowing. Negative for dental problem, drooling, ear discharge, ear pain, facial swelling, hearing loss, postnasal drip and voice change. Eyes: Negative for photophobia, pain, discharge, redness, itching and visual disturbance. Respiratory: Positive for cough, chest tightness, shortness of breath and wheezing. Negative for apnea, choking and stridor. Cardiovascular: Negative for chest pain, palpitations and leg swelling. Gastrointestinal: Positive for abdominal pain, constipation, diarrhea, nausea and vomiting. Negative for abdominal distention, anal bleeding, blood in stool and rectal pain. Endocrine: Positive for polydipsia and polyphagia. Negative for cold intolerance, heat intolerance and polyuria. Genitourinary: Positive for enuresis. Negative for decreased urine volume, difficulty urinating, dyspareunia, dysuria, flank pain, frequency, genital sores, hematuria, menstrual problem, pelvic pain, urgency, vaginal bleeding, vaginal discharge and vaginal pain. Musculoskeletal: Positive for arthralgias, back pain, joint swelling, neck pain and neck stiffness.  Negative for gait problem and myalgias. Skin: Negative for color change, pallor, rash and wound. Allergic/Immunologic: Positive for environmental allergies. Negative for food allergies and immunocompromised state. Neurological: Positive for dizziness, syncope, weakness, light-headedness, numbness and headaches. Negative for tremors, seizures, facial asymmetry and speech difficulty. Hematological: Negative for adenopathy. Bruises/bleeds easily. Psychiatric/Behavioral: Positive for behavioral problems, confusion, decreased concentration, self-injury and sleep disturbance. Negative for agitation, dysphoric mood, hallucinations and suicidal ideas. The patient is nervous/anxious. The patient is not hyperactive. Objective:     Vitals:    01/30/18 1259   BP: 118/66   Site: Left Arm   Position: Sitting   Pulse: 88   Resp: 14   Temp: 97.9 °F (36.6 °C)   TempSrc: Oral   Weight: 171 lb (77.6 kg)   Height: 5' 4\" (1.626 m)       Physical Exam   Physical Exam    Head is normocephalic. CYNDEE, EOM full, no diplopia, no nystagmus. Conjunctivae pink, moist, no discharge. Pinnae are WNL  R External auditory canal clear and free of any pathology  L External auditory canal clear and free of any pathology   Tympanic membranes:  R normal  L normal    Nasal bones: intact  Septum:  deviated  Mucosa: clear  Turbinates: red and swollen   Discharge: yellow    Lips, tongue and oral cavity show tongue is midline, mobile, no lesions. Dentition: fair, no malocclusion  Oral mucosa: normal  Tonsils: normal  Oropharynx: normal-appearing mucosa and no pharyngitis, no exudate  Uvula midline. Gag reflex present  Nasopharynx: not seen    No facial redness, swelling or tenderness. Salivary glands not enlarged.     Neck supple  Cervical adenopathy: no palpable lymphadenopathy    Trachea midline  Thyroid not enlarged, no palpable nodules or masses    Chest equal and symmetrical expansion, no retractions    Extremities: no clubbing, cyanosis or edema  Gait steady  Skin: normal exposed surfaces  Cranial nerves grossly intact      Data:  All of the past medical history, past surgical history, family history, social history, allergies and current medications were reviewed. Assessment:   Assessment   1. Nasal congestion  CT FACIAL BONES WO CONTRAST   2. Nasal obstruction  CT FACIAL BONES WO CONTRAST   3. Sinus pressure  CT FACIAL BONES WO CONTRAST   4. Chronic sinusitis, unspecified location  CT FACIAL BONES WO CONTRAST   5. H/O epistaxis            Plan:      Questions answered and findings explained. The rationale for prolonged antibiotics for sinusitis was discussed including the importance of recovery of the mucociliary transport mechanism to prevent reinfection from retained secretions. The patient will be given a four-week prescription for broad-spectrum antibiotics, will use topical steroid nasal spray, and perform bid sinus irrigations. Then a CT of the paranasal sinuses without contrast will be obtained, and the patient will return for reevaluation on the same day. Medications Ordered:  Orders Placed This Encounter   Medications    amoxicillin-clavulanate (AUGMENTIN) 875-125 MG per tablet     Sig: Take 1 tablet by mouth 2 times daily for 28 days     Dispense:  56 tablet     Refill:  0    fluticasone (FLONASE) 50 MCG/ACT nasal spray     Si spray by Nasal route daily     Dispense:  1 Bottle     Refill:  3       Orders Placed:  Orders Placed This Encounter   Procedures    CT FACIAL BONES WO CONTRAST     Standing Status:   Future     Standing Expiration Date:   2019     Scheduling Instructions: With IGS protocol     Order Specific Question:   Reason for exam:     Answer:   nasal congestion, nasal obstruction, chronic sinus pressure, chronic sinusitis       Return in about 1 month (around 2018) for follow-up.          Electronically signed by JENN Dewey on 2018 at 2:35 PM

## 2018-01-30 NOTE — PROGRESS NOTES
Pt here for f/u [address] [facility] cath no stent    Pt continues with chest pain, describes as stabbing pain at times, pain radiates to left leg, rates pain 3-4/10, has chest pain now , heart palpitations,     Pt continues with dizziness at times, swelling in bilateral ankles and fingers at times, sob on exertion and at rest.    EKG done today

## 2018-02-09 ENCOUNTER — APPOINTMENT (OUTPATIENT)
Dept: GENERAL RADIOLOGY | Age: 63
End: 2018-02-09
Payer: COMMERCIAL

## 2018-02-09 ENCOUNTER — HOSPITAL ENCOUNTER (EMERGENCY)
Age: 63
Discharge: HOME | End: 2018-02-09
Payer: COMMERCIAL

## 2018-02-09 VITALS
OXYGEN SATURATION: 97 % | RESPIRATION RATE: 18 BRPM | DIASTOLIC BLOOD PRESSURE: 69 MMHG | SYSTOLIC BLOOD PRESSURE: 110 MMHG | HEART RATE: 73 BPM | BODY MASS INDEX: 29.37 KG/M2 | HEIGHT: 64 IN | TEMPERATURE: 97.9 F | WEIGHT: 172 LBS

## 2018-02-09 DIAGNOSIS — G89.29 ACUTE EXACERBATION OF CHRONIC LOW BACK PAIN: ICD-10-CM

## 2018-02-09 DIAGNOSIS — S80.01XA CONTUSION OF RIGHT KNEE, INITIAL ENCOUNTER: ICD-10-CM

## 2018-02-09 DIAGNOSIS — S63.502A SPRAIN OF LEFT WRIST, INITIAL ENCOUNTER: Primary | ICD-10-CM

## 2018-02-09 DIAGNOSIS — M54.50 ACUTE EXACERBATION OF CHRONIC LOW BACK PAIN: ICD-10-CM

## 2018-02-09 LAB
FLU A ANTIGEN: NEGATIVE
FLU B ANTIGEN: NEGATIVE

## 2018-02-09 PROCEDURE — 71046 X-RAY EXAM CHEST 2 VIEWS: CPT

## 2018-02-09 PROCEDURE — 73564 X-RAY EXAM KNEE 4 OR MORE: CPT

## 2018-02-09 PROCEDURE — 73110 X-RAY EXAM OF WRIST: CPT

## 2018-02-09 PROCEDURE — 87804 INFLUENZA ASSAY W/OPTIC: CPT

## 2018-02-09 PROCEDURE — 72110 X-RAY EXAM L-2 SPINE 4/>VWS: CPT

## 2018-02-09 PROCEDURE — 96375 TX/PRO/DX INJ NEW DRUG ADDON: CPT

## 2018-02-09 PROCEDURE — 96374 THER/PROPH/DIAG INJ IV PUSH: CPT

## 2018-02-09 PROCEDURE — 6360000002 HC RX W HCPCS: Performed by: STUDENT IN AN ORGANIZED HEALTH CARE EDUCATION/TRAINING PROGRAM

## 2018-02-09 PROCEDURE — 6370000000 HC RX 637 (ALT 250 FOR IP): Performed by: STUDENT IN AN ORGANIZED HEALTH CARE EDUCATION/TRAINING PROGRAM

## 2018-02-09 PROCEDURE — 99283 EMERGENCY DEPT VISIT LOW MDM: CPT

## 2018-02-09 RX ORDER — ACETAMINOPHEN 325 MG/1
650 TABLET ORAL ONCE
Status: COMPLETED | OUTPATIENT
Start: 2018-02-09 | End: 2018-02-09

## 2018-02-09 RX ORDER — KETOROLAC TROMETHAMINE 30 MG/ML
30 INJECTION, SOLUTION INTRAMUSCULAR; INTRAVENOUS ONCE
Status: COMPLETED | OUTPATIENT
Start: 2018-02-09 | End: 2018-02-09

## 2018-02-09 RX ORDER — NAPROXEN 500 MG/1
500 TABLET ORAL 2 TIMES DAILY WITH MEALS
Qty: 30 TABLET | Refills: 0 | Status: ON HOLD | OUTPATIENT
Start: 2018-02-09 | End: 2018-02-18

## 2018-02-09 RX ORDER — ORPHENADRINE CITRATE 30 MG/ML
60 INJECTION INTRAMUSCULAR; INTRAVENOUS ONCE
Status: COMPLETED | OUTPATIENT
Start: 2018-02-09 | End: 2018-02-09

## 2018-02-09 RX ADMIN — ORPHENADRINE CITRATE 60 MG: 30 INJECTION INTRAMUSCULAR; INTRAVENOUS at 15:08

## 2018-02-09 RX ADMIN — KETOROLAC TROMETHAMINE 30 MG: 30 INJECTION, SOLUTION INTRAMUSCULAR at 15:09

## 2018-02-09 RX ADMIN — ACETAMINOPHEN 650 MG: 325 TABLET ORAL at 17:52

## 2018-02-09 ASSESSMENT — ENCOUNTER SYMPTOMS
SHORTNESS OF BREATH: 0
SORE THROAT: 0
COUGH: 1
WHEEZING: 0
RHINORRHEA: 1
EYE PAIN: 0
BACK PAIN: 1
NAUSEA: 0
VOMITING: 0
DIARRHEA: 0
ABDOMINAL PAIN: 0
EYE DISCHARGE: 0

## 2018-02-09 ASSESSMENT — PAIN SCALES - GENERAL
PAINLEVEL_OUTOF10: 10

## 2018-02-09 ASSESSMENT — PAIN DESCRIPTION - LOCATION: LOCATION: GENERALIZED

## 2018-02-09 ASSESSMENT — PAIN DESCRIPTION - PAIN TYPE: TYPE: ACUTE PAIN

## 2018-02-09 NOTE — ED PROVIDER NOTES
swelling. Gastrointestinal: Negative for abdominal pain, diarrhea, nausea and vomiting. Genitourinary: Negative for difficulty urinating, dysuria and vaginal discharge. Musculoskeletal: Positive for arthralgias, back pain, gait problem and myalgias. Negative for joint swelling and neck pain. Skin: Negative for pallor and rash. Neurological: Negative for dizziness, syncope, weakness, light-headedness and headaches. Hematological: Negative for adenopathy. Psychiatric/Behavioral: Negative for confusion, dysphoric mood and suicidal ideas. The patient is not nervous/anxious. PAST MEDICAL HISTORY    has a past medical history of Allergic rhinitis; Arthritis; Bipolar disorder ([address] Cancer ([facility] Colon polyps; COPD (chronic obstructive pulmonary disease) ([facility] [facility] Depression; Diabetes ([facility] [facility] Diverticulitis of colon; GERD (gastroesophageal reflux disease); Hiatal hernia; History of colonoscopy; History of kidney stones; Hx of blood clots; Hyperlipidemia; Hypertension; Liver disease; Pneumonia; Suicidal thoughts; Thyroid disease; and Vomiting. SURGICAL HISTORY      has a past surgical history that includes Mandible fracture surgery (1984); shoulder surgery (2014); Colonoscopy (2011); Dilatation, esophagus; Elbow surgery (Right, 10/7/2004); Rotator cuff repair (2004, 2014); skin biopsy (2006); Cholecystectomy, laparoscopic (6/12/15); Elbow surgery (Bilateral, 2016); Carpal tunnel release (Bilateral, 2016); Abdomen surgery; Hysterectomy (1998); and Cardiac surgery.     CURRENT MEDICATIONS       Discharge Medication List as of 2/9/2018  5:12 PM      CONTINUE these medications which have NOT CHANGED    Details   isosorbide dinitrate (ISORDIL) 10 MG tablet Take 1 tablet by mouth 3 times daily, Disp-270 tablet, R-1Normal      carvedilol (COREG) 3.125 MG tablet Take 1 tablet by mouth 2 times daily (with meals), Disp-180 tablet, R-1Normal      amoxicillin-clavulanate (AUGMENTIN) 875-125 MG per tablet Take 1 tablet by mouth 2 times daily for 28 days, Disp-56 tablet, R-0Normal      !! fluticasone (FLONASE) 50 MCG/ACT nasal spray 1 spray by Nasal route daily, Disp-1 Bottle, R-3Normal      docusate sodium (COLACE, DULCOLAX) 100 MG CAPS Take 100 mg by mouth 2 times daily, Disp-30 capsule, R-0Normal      hydrochlorothiazide (HYDRODIURIL) 25 MG tablet Take 50 mg by mouth dailyHistorical Med      sucralfate (CARAFATE) 1 GM/10ML suspension Take 1 g by mouth 4 times dailyHistorical Med      hydrOXYzine (VISTARIL) 50 MG capsule Take 50 mg by mouth 3 times daily as needed for AnxietyHistorical Med      metoclopramide (REGLAN) 10 MG tablet Take 5 mg by mouth 3 times daily Historical Med      acetaminophen (APAP EXTRA STRENGTH) 500 MG tablet Take 1 tablet by mouth every 6 hours as needed for Pain, Disp-120 tablet, R-0Print      ondansetron (ZOFRAN ODT) 4 MG disintegrating tablet Take 1 tablet by mouth every 8 hours as needed for Nausea, Disp-20 tablet, R-0Print      clopidogrel (PLAVIX) 75 MG tablet TAKE 1 TABLET BY MOUTH DAILY, Disp-30 tablet, R-1Maximum Refills ReachedNormal      aspirin 81 MG EC tablet Take 1 tablet by mouth daily, Disp-30 tablet, R-0Normal      escitalopram (LEXAPRO) 20 MG tablet Take 20 mg by mouth every morningHistorical Med      traZODone (DESYREL) 100 MG tablet Take 100 mg by mouth nightly as needed for Sleep Historical Med      clobetasol (TEMOVATE) 0.05 % cream Apply topically 2 times daily to corners of mouth as needed, Topical, 2 TIMES DAILY, Historical Med      nystatin (NYSTATIN) 311247 UNIT/GM powder Apply topically 4 times daily as needed , Topical, 4 TIMES DAILY PRN, Historical Med      loperamide (LOPERAMIDE A-D) 2 MG tablet Take 2 mg by mouth as needed for Diarrhea Historical Med      TUDORZA PRESSAIR 400 MCG/ACT AEPB inhaler 1 puff 2 times daily , R-0, DAWHistorical Med      lisinopril (PRINIVIL;ZESTRIL) 2.5 MG tablet Take 2.5 mg by mouth dailyHistorical Med      !! fluticasone (FLONASE) 50 MCG/ACT nasal spray 1 spray by Nasal route daily Each nostrilHistorical Med      ferrous sulfate 325 (65 FE) MG tablet Take 325 mg by mouth daily (with breakfast) Historical Med      vitamin B-12 (CYANOCOBALAMIN) 500 MCG tablet Take 500 mcg by mouth dailyHistorical Med      atorvastatin (LIPITOR) 40 MG tablet Take 40 mg by mouth nightly Historical Med      metFORMIN (GLUCOPHAGE) 500 MG tablet Take 500 mg by mouth 2 times daily (with meals) Historical Med      QUEtiapine (SEROQUEL XR) 300 MG extended release tablet Take 300 mg by mouth nightly Historical Med      levothyroxine (SYNTHROID) 75 MCG tablet Take 75 mcg by mouth Daily Historical Med      pantoprazole (PROTONIX) 40 MG tablet Take 1 tablet by mouth daily Indications: Gastroesophageal Reflux Disease, Disp-10 tablet, R-0Print      albuterol (PROVENTIL HFA;VENTOLIN HFA) 108 (90 BASE) MCG/ACT inhaler Inhale 2 puffs into the lungs every 6 hours as needed for Wheezing. !! - Potential duplicate medications found. Please discuss with provider. ALLERGIES     is allergic to seasonal.    FAMILY HISTORY     indicated that her mother is . She indicated that her father is . She indicated that her sister is alive. She indicated that her brother is alive. She indicated that the status of her maternal grandmother is unknown. She indicated that the status of her maternal grandfather is unknown. She indicated that the status of her paternal grandmother is unknown. She indicated that the status of her paternal grandfather is unknown. She indicated that the status of her maternal uncle is unknown.    family history includes Cancer in her father, mother, and sister; Depression in her father; Diabetes in her maternal grandmother; Early Death in her maternal grandfather; Heart Attack in her sister;  Heart Disease in her father, maternal grandfather, maternal grandmother, maternal uncle, mother, paternal grandfather, and paternal grandmother; High Blood Left hand: She exhibits bony tenderness (distal ulnar). She exhibits normal range of motion, no deformity and no swelling. Decreased sensation noted. Decreased strength noted. She exhibits finger abduction. She exhibits no thumb/finger opposition. No scaphoid tenderness. Neurological: She is alert and oriented to person, place, and time. Skin: Skin is warm and dry. No rash noted. Nursing note and vitals reviewed. DIFFERENTIAL DIAGNOSIS:   Lumbar strain vs fracture vs contusion, right knee sprain vs contusion, left wrist fracture vs sprain, influenza, bronchitis, pneumonia. DIAGNOSTIC RESULTS     EKG: All EKG's are interpreted by the Emergency Department Physician who either signs or Co-signs this chart in the absence of a cardiologist.    None     RADIOLOGY: non-plain film images(s) such as CT, Ultrasound and MRI are read by the radiologist.    XR CHEST STANDARD (2 VW)   Final Result   1. No acute intrathoracic process. 2. Findings suggestive of chronic lung disease. **This report has been created using voice recognition software. It may contain minor errors which are inherent in voice recognition technology. **      Final report electronically signed by Dr. Nash Sosa on 2/9/2018 3:25 PM      XR LUMBAR SPINE (MIN 4 VIEWS)   Final Result   No fracture or spondylolisthesis of the lumbar spine. Final report electronically signed by Dr. Nash Sosa on 2/9/2018 3:00 PM      XR WRIST LEFT (MIN 3 VIEWS)   Final Result   No fracture or dislocation. Final report electronically signed by Dr. Nash Sosa on 2/9/2018 2:59 PM      XR KNEE RIGHT (MIN 4 VIEWS)   Final Result   No fracture or dislocation.       Final report electronically signed by Dr. Nash Sosa on 2/9/2018 2:58 PM           LABS:     Labs Reviewed   RAPID INFLUENZA A/B ANTIGENS       EMERGENCY DEPARTMENT COURSE:   Vitals:    Vitals:    02/09/18 1359   BP: 110/69   Pulse: 73   Resp: 18   Temp: 97.9 °F (36.6 °C)   TempSrc: Oral   SpO2: 97%   Weight: 172 lb (78 kg)   Height: 5' 4\" (1.626 m)       2:44 PM: The patient was seen and evaluated. Labs and imaging will be completed. The patient will be given Norflex and Toradol while in the ED for her symptoms. MDM:  The patient was seen and evaluated in the ED following a fall. She presented to the ED in no acute distress, was ambulatory on arrival. There was mild tenderness and swelling to the left distal ulnar, range of motion was intact. There was mild right knee lateral joint line tenderness, no joint laxity present. The patient also had midline lumbar spine tenderness at L4-L5 and S1 on exam. Imaging was completed. The patient's CXR did not show any signs of pneumonia. There as no bony abnormalities present on her x-ray results. Influenza screen was negative. Her pain was improved with Toradol and Norflex while in the ED. The patient was discharged home in stable condition with instructions to follow up with her PCP as needed for recheck. The patient was provided with prescription for Naproxen to be used as needed for management of her symptoms, the patient was also instructed to use ice compresses for additional pain relief. The patient was amenable with all discharge and follow up instructions. All questions were addressed and answered. Return precautions were given for new or worsening symptoms. CRITICAL CARE:   None      CONSULTS:  None     PROCEDURES:  None     FINAL IMPRESSION      1. Sprain of left wrist, initial encounter    2. Acute exacerbation of chronic low back pain    3.  Contusion of right knee, initial encounter          DISPOSITION/PLAN   Discharge     PATIENT REFERRED TO:  [address] [place] [address]  [phone]  Schedule an appointment as soon as possible for a visit   if symptoms continue or worsen      DISCHARGE MEDICATIONS:  Discharge Medication List as of 2/9/2018  5:12 PM      START taking these

## 2018-02-12 RX ORDER — CLOPIDOGREL BISULFATE 75 MG/1
75 TABLET ORAL DAILY
Qty: 30 TABLET | Refills: 11 | Status: SHIPPED | OUTPATIENT
Start: 2018-02-12 | End: 2019-03-08 | Stop reason: SDUPTHER

## 2018-02-17 ENCOUNTER — HOSPITAL ENCOUNTER (INPATIENT)
Age: 63
LOS: 1 days | Discharge: HOME | DRG: 140 | End: 2018-02-19
Attending: EMERGENCY MEDICINE | Admitting: INTERNAL MEDICINE
Payer: COMMERCIAL

## 2018-02-17 ENCOUNTER — APPOINTMENT (OUTPATIENT)
Dept: GENERAL RADIOLOGY | Age: 63
DRG: 140 | End: 2018-02-17
Payer: COMMERCIAL

## 2018-02-17 DIAGNOSIS — J44.9 CHRONIC OBSTRUCTIVE PULMONARY DISEASE, UNSPECIFIED COPD TYPE (HCC): ICD-10-CM

## 2018-02-17 DIAGNOSIS — E83.42 HYPOMAGNESEMIA: ICD-10-CM

## 2018-02-17 DIAGNOSIS — E87.1 HYPONATREMIA: ICD-10-CM

## 2018-02-17 DIAGNOSIS — E87.8 ELECTROLYTE ABNORMALITY: Primary | ICD-10-CM

## 2018-02-17 LAB
ANION GAP SERPL CALCULATED.3IONS-SCNC: 13 MEQ/L (ref 8–16)
ANISOCYTOSIS: ABNORMAL
BASOPHILS # BLD: 0.6 %
BASOPHILS ABSOLUTE: 0 THOU/MM3 (ref 0–0.1)
BUN BLDV-MCNC: 6 MG/DL (ref 7–22)
CALCIUM SERPL-MCNC: 9.5 MG/DL (ref 8.5–10.5)
CHLORIDE BLD-SCNC: 91 MEQ/L (ref 98–111)
CO2: 28 MEQ/L (ref 23–33)
CREAT SERPL-MCNC: 0.6 MG/DL (ref 0.4–1.2)
EOSINOPHIL # BLD: 0.6 %
EOSINOPHILS ABSOLUTE: 0 THOU/MM3 (ref 0–0.4)
FLU A ANTIGEN: NEGATIVE
FLU B ANTIGEN: NEGATIVE
GFR SERPL CREATININE-BSD FRML MDRD: > 90 ML/MIN/1.73M2
GLUCOSE BLD-MCNC: 98 MG/DL (ref 70–108)
HCT VFR BLD CALC: 37.1 % (ref 37–47)
HEMOGLOBIN: 12.7 GM/DL (ref 12–16)
LYMPHOCYTES # BLD: 21.1 %
LYMPHOCYTES ABSOLUTE: 1.6 THOU/MM3 (ref 1–4.8)
MAGNESIUM: 1 MG/DL (ref 1.6–2.4)
MCH RBC QN AUTO: 28.7 PG (ref 27–31)
MCHC RBC AUTO-ENTMCNC: 34.2 GM/DL (ref 33–37)
MCV RBC AUTO: 83.9 FL (ref 81–99)
MONOCYTES # BLD: 8.5 %
MONOCYTES ABSOLUTE: 0.6 THOU/MM3 (ref 0.4–1.3)
NUCLEATED RED BLOOD CELLS: 0 /100 WBC
OSMOLALITY CALCULATION: 262.1 MOSMOL/KG (ref 275–300)
PDW BLD-RTO: 16 % (ref 11.5–14.5)
PLATELET # BLD: 224 THOU/MM3 (ref 130–400)
PMV BLD AUTO: 8.3 FL (ref 7.4–10.4)
POTASSIUM REFLEX MAGNESIUM: 3.5 MEQ/L (ref 3.5–5.2)
PRO-BNP: 263.4 PG/ML (ref 0–900)
RBC # BLD: 4.42 MILL/MM3 (ref 4.2–5.4)
SEG NEUTROPHILS: 69.2 %
SEGMENTED NEUTROPHILS ABSOLUTE COUNT: 5.1 THOU/MM3 (ref 1.8–7.7)
SODIUM BLD-SCNC: 132 MEQ/L (ref 135–145)
TROPONIN T: < 0.01 NG/ML
WBC # BLD: 7.4 THOU/MM3 (ref 4.8–10.8)

## 2018-02-17 PROCEDURE — 36415 COLL VENOUS BLD VENIPUNCTURE: CPT

## 2018-02-17 PROCEDURE — 71046 X-RAY EXAM CHEST 2 VIEWS: CPT

## 2018-02-17 PROCEDURE — 93005 ELECTROCARDIOGRAM TRACING: CPT | Performed by: EMERGENCY MEDICINE

## 2018-02-17 PROCEDURE — 83880 ASSAY OF NATRIURETIC PEPTIDE: CPT

## 2018-02-17 PROCEDURE — 2580000003 HC RX 258: Performed by: EMERGENCY MEDICINE

## 2018-02-17 PROCEDURE — 6360000002 HC RX W HCPCS: Performed by: EMERGENCY MEDICINE

## 2018-02-17 PROCEDURE — 84484 ASSAY OF TROPONIN QUANT: CPT

## 2018-02-17 PROCEDURE — 83735 ASSAY OF MAGNESIUM: CPT

## 2018-02-17 PROCEDURE — 80048 BASIC METABOLIC PNL TOTAL CA: CPT

## 2018-02-17 PROCEDURE — 6370000000 HC RX 637 (ALT 250 FOR IP): Performed by: EMERGENCY MEDICINE

## 2018-02-17 PROCEDURE — 96365 THER/PROPH/DIAG IV INF INIT: CPT

## 2018-02-17 PROCEDURE — 94640 AIRWAY INHALATION TREATMENT: CPT

## 2018-02-17 PROCEDURE — 99285 EMERGENCY DEPT VISIT HI MDM: CPT

## 2018-02-17 PROCEDURE — 85025 COMPLETE CBC W/AUTO DIFF WBC: CPT

## 2018-02-17 PROCEDURE — 96375 TX/PRO/DX INJ NEW DRUG ADDON: CPT

## 2018-02-17 PROCEDURE — 99223 1ST HOSP IP/OBS HIGH 75: CPT | Performed by: INTERNAL MEDICINE

## 2018-02-17 PROCEDURE — 87804 INFLUENZA ASSAY W/OPTIC: CPT

## 2018-02-17 RX ORDER — 0.9 % SODIUM CHLORIDE 0.9 %
1000 INTRAVENOUS SOLUTION INTRAVENOUS ONCE
Status: COMPLETED | OUTPATIENT
Start: 2018-02-17 | End: 2018-02-18

## 2018-02-17 RX ORDER — MORPHINE SULFATE 2 MG/ML
2 INJECTION, SOLUTION INTRAMUSCULAR; INTRAVENOUS
Status: DISCONTINUED | OUTPATIENT
Start: 2018-02-17 | End: 2018-02-18 | Stop reason: DRUGHIGH

## 2018-02-17 RX ORDER — MAGNESIUM SULFATE IN WATER 40 MG/ML
4 INJECTION, SOLUTION INTRAVENOUS ONCE
Status: COMPLETED | OUTPATIENT
Start: 2018-02-18 | End: 2018-02-18

## 2018-02-17 RX ORDER — IPRATROPIUM BROMIDE AND ALBUTEROL SULFATE 2.5; .5 MG/3ML; MG/3ML
1 SOLUTION RESPIRATORY (INHALATION) ONCE
Status: COMPLETED | OUTPATIENT
Start: 2018-02-17 | End: 2018-02-17

## 2018-02-17 RX ORDER — SODIUM CHLORIDE 9 MG/ML
INJECTION, SOLUTION INTRAVENOUS CONTINUOUS
Status: DISCONTINUED | OUTPATIENT
Start: 2018-02-17 | End: 2018-02-18 | Stop reason: DRUGHIGH

## 2018-02-17 RX ORDER — ONDANSETRON 2 MG/ML
4 INJECTION INTRAMUSCULAR; INTRAVENOUS EVERY 30 MIN PRN
Status: DISCONTINUED | OUTPATIENT
Start: 2018-02-17 | End: 2018-02-18 | Stop reason: DRUGHIGH

## 2018-02-17 RX ADMIN — ONDANSETRON HYDROCHLORIDE 4 MG: 2 INJECTION, SOLUTION INTRAVENOUS at 23:03

## 2018-02-17 RX ADMIN — MORPHINE SULFATE 2 MG: 2 INJECTION, SOLUTION INTRAMUSCULAR; INTRAVENOUS at 23:02

## 2018-02-17 RX ADMIN — MAGNESIUM SULFATE HEPTAHYDRATE 4 G: 40 INJECTION, SOLUTION INTRAVENOUS at 23:46

## 2018-02-17 RX ADMIN — IPRATROPIUM BROMIDE AND ALBUTEROL SULFATE 1 AMPULE: .5; 3 SOLUTION RESPIRATORY (INHALATION) at 21:37

## 2018-02-17 RX ADMIN — SODIUM CHLORIDE 1000 ML: 9 INJECTION, SOLUTION INTRAVENOUS at 23:40

## 2018-02-17 ASSESSMENT — ENCOUNTER SYMPTOMS
VOMITING: 1
SORE THROAT: 1
BACK PAIN: 1
COUGH: 1
ABDOMINAL PAIN: 0
BLOOD IN STOOL: 0
SHORTNESS OF BREATH: 1
DIARRHEA: 0
WHEEZING: 1
NAUSEA: 1

## 2018-02-17 ASSESSMENT — PAIN DESCRIPTION - LOCATION: LOCATION: HEAD

## 2018-02-17 ASSESSMENT — PAIN SCALES - GENERAL
PAINLEVEL_OUTOF10: 7
PAINLEVEL_OUTOF10: 8

## 2018-02-18 PROBLEM — J44.1 COPD WITH ACUTE EXACERBATION (HCC): Status: ACTIVE | Noted: 2018-02-18

## 2018-02-18 PROBLEM — F17.200 TOBACCO USE DISORDER: Status: ACTIVE | Noted: 2018-02-18

## 2018-02-18 PROBLEM — I50.32 CHRONIC DIASTOLIC HEART FAILURE (HCC): Status: ACTIVE | Noted: 2018-02-18

## 2018-02-18 PROBLEM — R06.89 ACUTE RESPIRATORY INSUFFICIENCY: Status: ACTIVE | Noted: 2018-02-18

## 2018-02-18 PROBLEM — E87.8 ELECTROLYTE ABNORMALITY: Status: ACTIVE | Noted: 2018-02-18

## 2018-02-18 PROBLEM — E87.1 HYPO-OSMOLALITY AND HYPONATREMIA (CODE): Status: ACTIVE | Noted: 2018-02-18

## 2018-02-18 LAB
AMPHETAMINE+METHAMPHETAMINE URINE SCREEN: NEGATIVE
ANION GAP SERPL CALCULATED.3IONS-SCNC: 15 MEQ/L (ref 8–16)
BARBITURATE QUANTITATIVE URINE: NEGATIVE
BENZODIAZEPINE QUANTITATIVE URINE: NEGATIVE
BUN BLDV-MCNC: 10 MG/DL (ref 7–22)
CALCIUM SERPL-MCNC: 9.2 MG/DL (ref 8.5–10.5)
CANNABINOID QUANTITATIVE URINE: NEGATIVE
CHLORIDE BLD-SCNC: 93 MEQ/L (ref 98–111)
CO2: 26 MEQ/L (ref 23–33)
COCAINE METABOLITE QUANTITATIVE URINE: POSITIVE
CREAT SERPL-MCNC: 0.7 MG/DL (ref 0.4–1.2)
D-DIMER QUANTITATIVE: < 215 NG/ML FEU (ref 0–500)
EKG ATRIAL RATE: 76 BPM
EKG ATRIAL RATE: 95 BPM
EKG P AXIS: 47 DEGREES
EKG P AXIS: 54 DEGREES
EKG P-R INTERVAL: 126 MS
EKG P-R INTERVAL: 150 MS
EKG Q-T INTERVAL: 392 MS
EKG Q-T INTERVAL: 412 MS
EKG QRS DURATION: 82 MS
EKG QRS DURATION: 86 MS
EKG QTC CALCULATION (BAZETT): 463 MS
EKG QTC CALCULATION (BAZETT): 492 MS
EKG R AXIS: -2 DEGREES
EKG R AXIS: 6 DEGREES
EKG T AXIS: 35 DEGREES
EKG T AXIS: 62 DEGREES
EKG VENTRICULAR RATE: 76 BPM
EKG VENTRICULAR RATE: 95 BPM
GFR SERPL CREATININE-BSD FRML MDRD: 85 ML/MIN/1.73M2
GLUCOSE BLD-MCNC: 371 MG/DL (ref 70–108)
MAGNESIUM: 1.6 MG/DL (ref 1.6–2.4)
OPIATES, URINE: POSITIVE
OXYCODONE: NEGATIVE
PHENCYCLIDINE QUANTITATIVE URINE: NEGATIVE
POTASSIUM REFLEX MAGNESIUM: 3.8 MEQ/L (ref 3.5–5.2)
PROCALCITONIN: 0.08 NG/ML (ref 0.01–0.09)
SODIUM BLD-SCNC: 134 MEQ/L (ref 135–145)
TROPONIN T: < 0.01 NG/ML
TROPONIN T: < 0.01 NG/ML

## 2018-02-18 PROCEDURE — 85379 FIBRIN DEGRADATION QUANT: CPT

## 2018-02-18 PROCEDURE — 84484 ASSAY OF TROPONIN QUANT: CPT

## 2018-02-18 PROCEDURE — 93010 ELECTROCARDIOGRAM REPORT: CPT | Performed by: INTERNAL MEDICINE

## 2018-02-18 PROCEDURE — G0378 HOSPITAL OBSERVATION PER HR: HCPCS

## 2018-02-18 PROCEDURE — 96365 THER/PROPH/DIAG IV INF INIT: CPT

## 2018-02-18 PROCEDURE — 36415 COLL VENOUS BLD VENIPUNCTURE: CPT

## 2018-02-18 PROCEDURE — 2580000003 HC RX 258: Performed by: EMERGENCY MEDICINE

## 2018-02-18 PROCEDURE — 96372 THER/PROPH/DIAG INJ SC/IM: CPT

## 2018-02-18 PROCEDURE — 99232 SBSQ HOSP IP/OBS MODERATE 35: CPT | Performed by: INTERNAL MEDICINE

## 2018-02-18 PROCEDURE — 96376 TX/PRO/DX INJ SAME DRUG ADON: CPT

## 2018-02-18 PROCEDURE — 6360000002 HC RX W HCPCS: Performed by: INTERNAL MEDICINE

## 2018-02-18 PROCEDURE — 96375 TX/PRO/DX INJ NEW DRUG ADDON: CPT

## 2018-02-18 PROCEDURE — 6370000000 HC RX 637 (ALT 250 FOR IP): Performed by: INTERNAL MEDICINE

## 2018-02-18 PROCEDURE — 96367 TX/PROPH/DG ADDL SEQ IV INF: CPT

## 2018-02-18 PROCEDURE — 83735 ASSAY OF MAGNESIUM: CPT

## 2018-02-18 PROCEDURE — 96366 THER/PROPH/DIAG IV INF ADDON: CPT

## 2018-02-18 PROCEDURE — 2500000003 HC RX 250 WO HCPCS: Performed by: INTERNAL MEDICINE

## 2018-02-18 PROCEDURE — 94640 AIRWAY INHALATION TREATMENT: CPT

## 2018-02-18 PROCEDURE — 80048 BASIC METABOLIC PNL TOTAL CA: CPT

## 2018-02-18 PROCEDURE — 1200000003 HC TELEMETRY R&B

## 2018-02-18 PROCEDURE — 84145 PROCALCITONIN (PCT): CPT

## 2018-02-18 PROCEDURE — 2580000003 HC RX 258: Performed by: INTERNAL MEDICINE

## 2018-02-18 PROCEDURE — 93005 ELECTROCARDIOGRAM TRACING: CPT | Performed by: INTERNAL MEDICINE

## 2018-02-18 PROCEDURE — 80307 DRUG TEST PRSMV CHEM ANLYZR: CPT

## 2018-02-18 PROCEDURE — 87205 SMEAR GRAM STAIN: CPT

## 2018-02-18 PROCEDURE — G0480 DRUG TEST DEF 1-7 CLASSES: HCPCS

## 2018-02-18 RX ORDER — MORPHINE SULFATE 2 MG/ML
2 INJECTION, SOLUTION INTRAMUSCULAR; INTRAVENOUS
Status: DISPENSED | OUTPATIENT
Start: 2018-02-18 | End: 2018-02-19

## 2018-02-18 RX ORDER — NICOTINE 21 MG/24HR
1 PATCH, TRANSDERMAL 24 HOURS TRANSDERMAL DAILY
Status: DISCONTINUED | OUTPATIENT
Start: 2018-02-18 | End: 2018-02-19 | Stop reason: HOSPADM

## 2018-02-18 RX ORDER — HYDROCODONE BITARTRATE AND ACETAMINOPHEN 5; 325 MG/1; MG/1
2 TABLET ORAL EVERY 4 HOURS PRN
Status: DISCONTINUED | OUTPATIENT
Start: 2018-02-18 | End: 2018-02-18

## 2018-02-18 RX ORDER — BENZONATATE 100 MG/1
100 CAPSULE ORAL 3 TIMES DAILY PRN
Status: DISCONTINUED | OUTPATIENT
Start: 2018-02-18 | End: 2018-02-19 | Stop reason: HOSPADM

## 2018-02-18 RX ORDER — POTASSIUM CHLORIDE 20MEQ/15ML
40 LIQUID (ML) ORAL PRN
Status: DISCONTINUED | OUTPATIENT
Start: 2018-02-18 | End: 2018-02-19 | Stop reason: HOSPADM

## 2018-02-18 RX ORDER — NITROGLYCERIN 0.4 MG/1
0.4 TABLET SUBLINGUAL EVERY 5 MIN PRN
Status: DISCONTINUED | OUTPATIENT
Start: 2018-02-18 | End: 2018-02-18

## 2018-02-18 RX ORDER — MORPHINE SULFATE 2 MG/ML
4 INJECTION, SOLUTION INTRAMUSCULAR; INTRAVENOUS
Status: DISPENSED | OUTPATIENT
Start: 2018-02-18 | End: 2018-02-19

## 2018-02-18 RX ORDER — SODIUM CHLORIDE 9 MG/ML
INJECTION, SOLUTION INTRAVENOUS ONCE
Status: COMPLETED | OUTPATIENT
Start: 2018-02-18 | End: 2018-02-18

## 2018-02-18 RX ORDER — ACETAMINOPHEN 325 MG/1
650 TABLET ORAL EVERY 4 HOURS PRN
Status: DISCONTINUED | OUTPATIENT
Start: 2018-02-18 | End: 2018-02-19 | Stop reason: HOSPADM

## 2018-02-18 RX ORDER — IPRATROPIUM BROMIDE AND ALBUTEROL SULFATE 2.5; .5 MG/3ML; MG/3ML
1 SOLUTION RESPIRATORY (INHALATION)
Status: DISCONTINUED | OUTPATIENT
Start: 2018-02-18 | End: 2018-02-19 | Stop reason: HOSPADM

## 2018-02-18 RX ORDER — ACETAMINOPHEN 325 MG/1
650 TABLET ORAL EVERY 4 HOURS PRN
Status: DISCONTINUED | OUTPATIENT
Start: 2018-02-18 | End: 2018-02-18 | Stop reason: SDUPTHER

## 2018-02-18 RX ORDER — SODIUM CHLORIDE 0.9 % (FLUSH) 0.9 %
10 SYRINGE (ML) INJECTION PRN
Status: DISCONTINUED | OUTPATIENT
Start: 2018-02-18 | End: 2018-02-19 | Stop reason: HOSPADM

## 2018-02-18 RX ORDER — POTASSIUM CHLORIDE 20 MEQ/1
40 TABLET, EXTENDED RELEASE ORAL PRN
Status: DISCONTINUED | OUTPATIENT
Start: 2018-02-18 | End: 2018-02-19 | Stop reason: HOSPADM

## 2018-02-18 RX ORDER — METHYLPREDNISOLONE SODIUM SUCCINATE 40 MG/ML
40 INJECTION, POWDER, LYOPHILIZED, FOR SOLUTION INTRAMUSCULAR; INTRAVENOUS EVERY 8 HOURS
Status: DISCONTINUED | OUTPATIENT
Start: 2018-02-18 | End: 2018-02-19

## 2018-02-18 RX ORDER — DILTIAZEM HYDROCHLORIDE 5 MG/ML
10 INJECTION INTRAVENOUS EVERY 6 HOURS
Status: DISCONTINUED | OUTPATIENT
Start: 2018-02-18 | End: 2018-02-19

## 2018-02-18 RX ORDER — POTASSIUM CHLORIDE 7.45 MG/ML
10 INJECTION INTRAVENOUS PRN
Status: DISCONTINUED | OUTPATIENT
Start: 2018-02-18 | End: 2018-02-19 | Stop reason: HOSPADM

## 2018-02-18 RX ORDER — ONDANSETRON 2 MG/ML
4 INJECTION INTRAMUSCULAR; INTRAVENOUS EVERY 6 HOURS PRN
Status: DISCONTINUED | OUTPATIENT
Start: 2018-02-18 | End: 2018-02-19 | Stop reason: HOSPADM

## 2018-02-18 RX ORDER — HYDROCODONE BITARTRATE AND ACETAMINOPHEN 5; 325 MG/1; MG/1
1 TABLET ORAL EVERY 4 HOURS PRN
Status: DISCONTINUED | OUTPATIENT
Start: 2018-02-18 | End: 2018-02-18

## 2018-02-18 RX ORDER — MAGNESIUM SULFATE IN WATER 40 MG/ML
2 INJECTION, SOLUTION INTRAVENOUS ONCE
Status: COMPLETED | OUTPATIENT
Start: 2018-02-18 | End: 2018-02-18

## 2018-02-18 RX ORDER — LEVOFLOXACIN 5 MG/ML
750 INJECTION, SOLUTION INTRAVENOUS EVERY 24 HOURS
Status: DISCONTINUED | OUTPATIENT
Start: 2018-02-18 | End: 2018-02-19

## 2018-02-18 RX ORDER — SODIUM CHLORIDE 0.9 % (FLUSH) 0.9 %
10 SYRINGE (ML) INJECTION EVERY 12 HOURS SCHEDULED
Status: DISCONTINUED | OUTPATIENT
Start: 2018-02-18 | End: 2018-02-19 | Stop reason: HOSPADM

## 2018-02-18 RX ORDER — SODIUM CHLORIDE 0.9 % (FLUSH) 0.9 %
10 SYRINGE (ML) INJECTION PRN
Status: DISCONTINUED | OUTPATIENT
Start: 2018-02-18 | End: 2018-02-18 | Stop reason: SDUPTHER

## 2018-02-18 RX ORDER — SODIUM CHLORIDE 0.9 % (FLUSH) 0.9 %
10 SYRINGE (ML) INJECTION EVERY 12 HOURS SCHEDULED
Status: DISCONTINUED | OUTPATIENT
Start: 2018-02-18 | End: 2018-02-18 | Stop reason: SDUPTHER

## 2018-02-18 RX ADMIN — IPRATROPIUM BROMIDE AND ALBUTEROL SULFATE 1 AMPULE: .5; 3 SOLUTION RESPIRATORY (INHALATION) at 12:54

## 2018-02-18 RX ADMIN — ENOXAPARIN SODIUM 40 MG: 40 INJECTION SUBCUTANEOUS at 09:41

## 2018-02-18 RX ADMIN — MORPHINE SULFATE 2 MG: 2 INJECTION, SOLUTION INTRAMUSCULAR; INTRAVENOUS at 14:16

## 2018-02-18 RX ADMIN — ASPIRIN 325 MG: 325 TABLET, DELAYED RELEASE ORAL at 19:35

## 2018-02-18 RX ADMIN — ENOXAPARIN SODIUM 80 MG: 80 INJECTION SUBCUTANEOUS at 19:36

## 2018-02-18 RX ADMIN — METHYLPREDNISOLONE SODIUM SUCCINATE 40 MG: 40 INJECTION, POWDER, FOR SOLUTION INTRAMUSCULAR; INTRAVENOUS at 05:21

## 2018-02-18 RX ADMIN — IPRATROPIUM BROMIDE AND ALBUTEROL SULFATE 1 AMPULE: .5; 3 SOLUTION RESPIRATORY (INHALATION) at 16:53

## 2018-02-18 RX ADMIN — IPRATROPIUM BROMIDE AND ALBUTEROL SULFATE 1 AMPULE: .5; 3 SOLUTION RESPIRATORY (INHALATION) at 21:20

## 2018-02-18 RX ADMIN — MORPHINE SULFATE 2 MG: 2 INJECTION, SOLUTION INTRAMUSCULAR; INTRAVENOUS at 19:38

## 2018-02-18 RX ADMIN — NITROGLYCERIN 0.5 INCH: 20 OINTMENT TOPICAL at 19:36

## 2018-02-18 RX ADMIN — METHYLPREDNISOLONE SODIUM SUCCINATE 40 MG: 40 INJECTION, POWDER, FOR SOLUTION INTRAMUSCULAR; INTRAVENOUS at 11:52

## 2018-02-18 RX ADMIN — Medication 10 ML: at 19:37

## 2018-02-18 RX ADMIN — SODIUM CHLORIDE: 9 INJECTION, SOLUTION INTRAVENOUS at 04:00

## 2018-02-18 RX ADMIN — ACETAMINOPHEN 650 MG: 325 TABLET ORAL at 01:35

## 2018-02-18 RX ADMIN — BENZONATATE 100 MG: 100 CAPSULE ORAL at 05:21

## 2018-02-18 RX ADMIN — METHYLPREDNISOLONE SODIUM SUCCINATE 40 MG: 40 INJECTION, POWDER, FOR SOLUTION INTRAMUSCULAR; INTRAVENOUS at 18:09

## 2018-02-18 RX ADMIN — HYDROCODONE BITARTRATE AND ACETAMINOPHEN 1 TABLET: 5; 325 TABLET ORAL at 04:00

## 2018-02-18 RX ADMIN — NITROGLYCERIN 0.5 INCH: 20 OINTMENT TOPICAL at 22:56

## 2018-02-18 RX ADMIN — SODIUM CHLORIDE: 9 INJECTION, SOLUTION INTRAVENOUS at 01:35

## 2018-02-18 RX ADMIN — LEVOFLOXACIN 750 MG: 5 INJECTION, SOLUTION INTRAVENOUS at 04:01

## 2018-02-18 RX ADMIN — MAGNESIUM SULFATE HEPTAHYDRATE 2 G: 40 INJECTION, SOLUTION INTRAVENOUS at 14:18

## 2018-02-18 RX ADMIN — HYDROCODONE BITARTRATE AND ACETAMINOPHEN 1 TABLET: 5; 325 TABLET ORAL at 10:13

## 2018-02-18 RX ADMIN — BENZONATATE 100 MG: 100 CAPSULE ORAL at 15:07

## 2018-02-18 RX ADMIN — MORPHINE SULFATE 4 MG: 2 INJECTION, SOLUTION INTRAMUSCULAR; INTRAVENOUS at 21:56

## 2018-02-18 RX ADMIN — BENZONATATE 100 MG: 100 CAPSULE ORAL at 21:52

## 2018-02-18 RX ADMIN — DILTIAZEM HYDROCHLORIDE 10 MG: 5 INJECTION INTRAVENOUS at 22:56

## 2018-02-18 RX ADMIN — IPRATROPIUM BROMIDE AND ALBUTEROL SULFATE 1 AMPULE: .5; 3 SOLUTION RESPIRATORY (INHALATION) at 09:23

## 2018-02-18 RX ADMIN — NITROGLYCERIN 0.4 MG: 0.4 TABLET SUBLINGUAL at 17:07

## 2018-02-18 ASSESSMENT — ENCOUNTER SYMPTOMS
VOICE CHANGE: 0
ANAL BLEEDING: 0
RHINORRHEA: 0
NAUSEA: 1
COUGH: 1
EYE ITCHING: 0
CONSTIPATION: 0
STRIDOR: 0
APNEA: 0
EYE REDNESS: 0
COLOR CHANGE: 0
VOMITING: 1
CHOKING: 0
ABDOMINAL DISTENTION: 0
WHEEZING: 0
RECTAL PAIN: 0
SHORTNESS OF BREATH: 1

## 2018-02-18 ASSESSMENT — PAIN DESCRIPTION - LOCATION
LOCATION: CHEST
LOCATION: THROAT
LOCATION: THROAT;HEAD
LOCATION: CHEST

## 2018-02-18 ASSESSMENT — PAIN DESCRIPTION - PAIN TYPE
TYPE: ACUTE PAIN

## 2018-02-18 ASSESSMENT — PAIN SCALES - GENERAL
PAINLEVEL_OUTOF10: 4
PAINLEVEL_OUTOF10: 6
PAINLEVEL_OUTOF10: 7
PAINLEVEL_OUTOF10: 6
PAINLEVEL_OUTOF10: 7
PAINLEVEL_OUTOF10: 7
PAINLEVEL_OUTOF10: 6
PAINLEVEL_OUTOF10: 6
PAINLEVEL_OUTOF10: 7
PAINLEVEL_OUTOF10: 7
PAINLEVEL_OUTOF10: 4
PAINLEVEL_OUTOF10: 6

## 2018-02-18 ASSESSMENT — PAIN DESCRIPTION - DESCRIPTORS: DESCRIPTORS: THROBBING

## 2018-02-18 NOTE — PROGRESS NOTES
Patient c/o chest pain/pressure 8/10. EKG obtained. Vitals obtained. Dr David Degroot updated. New orders received. Morphine and nitro administered. Patient voiced some relief. Pain rated 7/10. Updated Dr David Degroot again. Will continue to monitor.

## 2018-02-18 NOTE — PROGRESS NOTES
Pt admitted to  6K7 from ED. Complaints: cough/headache. IV normal saline infusing into the wrist left, condition patent and no redness at a rate of 100 mls/ hour with about 300 mls in the bag still. IV site free of s/s of infection or infiltration. Vital signs obtained. Assessment and data collection initiated. Oriented to room. Policies and procedures for  explained All questions answered with no further questions at this time. Fall prevention and safety brochure discussed with patient.

## 2018-02-18 NOTE — H&P
History & Physical        Patient:  Jean Pettit  YOB: 1955    MRN: [id number]     Acct: [de-identified]    PCP: Ha Duran CNP    Date of Admission: 2/17/2018    Date of Service: Pt seen/examined on 2/17/18 and Admitted to Inpatient with expected LOS greater than two midnights due to medical therapy. Chief Complaint:  Sob and cough      History Of Present Illness: For evaluation of a cough and cold symptoms for 2 days. She has myalgias headache and earache from coughing. She took some Phenergan at home prior to arrival because when she coughs it makes her vomit. She reports a fever of 101 at home. She denies any chest pain or heart problems today. She is short of breath and wheezing.    63 y.o. female who presented to [facility]     Past Medical History:          Diagnosis Date    Allergic rhinitis     Arthritis     spine    B[facility] [address]     Cancer ([facility] [address] [facility] 2011    cancerous polyps removed - Dr. Godwin Bonilla Colon polyps     COPD (chronic obstructive pulmonary disease) ([facility] [address] [facility] 7/24/2014    Depression     Diabetes ([facility] [address] [facility]     HgA1c 7.2 3/2017 - started Metformin and FSBS <150    Diverticulitis of colon     GERD (gastroesophageal reflux disease)     Hiatal hernia     History of colonoscopy 2002    History of kidney stones     Hx of blood clots 6/17/2014    PE and collar bone area after shoulder surgery    Hyperlipidemia     Hypertension     Liver disease     enlarged liver - damaged with alcohol in past but no cirrhosis per patient    Pneumonia 7/24/2014    Suicidal thoughts     2015 admitted to  from [facility]    Thyroid disease     Vomiting        Past Surgical History:          Procedure Laterality Date    ABDOMEN SURGERY      x4 removed cysts and ovary removed    CARDIAC SURGERY      heart caths, no stents    CARPAL TUNNEL RELEASE Bilateral 2016    CHOLECYSTECTOMY, LAPAROSCOPIC  6/12/15    Dr. Susi Otero oriented, thought content appropriate, normal insight  Capillary Refill: Brisk,< 3 seconds   Peripheral Pulses: +2 palpable, equal bilaterally       Labs:     Recent Labs      02/17/18 2129   WBC  7.4   HGB  12.7   HCT  37.1   PLT  224     Recent Labs      02/17/18 2129   NA  132*   K  3.5   CL  91*   CO2  28   BUN  6*   CREATININE  0.6   CALCIUM  9.5     No results for input(s): AST, ALT, BILIDIR, BILITOT, ALKPHOS in the last 72 hours. No results for input(s): INR in the last 72 hours. No results for input(s): Marcia Fine in the last 72 hours. Urinalysis:      Lab Results   Component Value Date    NITRU NEGATIVE 01/11/2018    WBCUA 50-75 01/11/2018    WBCUA 0-2 04/19/2012    BACTERIA NONE 01/11/2018    RBCUA 0-2 01/11/2018    BLOODU TRACE 01/11/2018    SPECGRAV 1.026 06/07/2016    GLUCOSEU NEGATIVE 01/11/2018       Radiology:     CXR: I have reviewed the CXR   EKG:  I have reviewed the EKG  XR CHEST STANDARD (2 VW)   Final Result    Stable radiographic appearance of the chest. No evidence of an acute process. **This report has been created using voice recognition software. It may contain minor errors which are inherent in voice recognition technology. **      Final report electronically signed by Dr. Cyril Ceja on 2/17/2018 10:23 PM               DVT prophylaxis: [x] Lovenox                                 [x] SCDs                                 [] SQ Heparin                                 [] Encourage ambulation           [] Already on Anticoagulation    Code Status: Full Code      PT/OT Eval Status:     Disposition:    [] Home       [] TCU       [] Rehab       [] Psych       [] SNF       [] [place]       [] Other-    ASSESSMENT:    C/Leslie Love 1106 Problems    Diagnosis Date Noted    Electrolyte abnormality [E87.8] 02/18/2018     Priority: High    COPD with acute exacerbation ([facility] [address] [J44.1] 02/18/2018     Priority: High    GERD (gastroesophageal reflux disease)

## 2018-02-18 NOTE — FLOWSHEET NOTE
02/18/18 0118   Provider Notification   Reason for Communication Evaluate   Provider Name Dr. Rubin Butcher   Provider Notification Physician   Method of Communication Call   Response Other (Comment)  (updated on patient's arrival to floor)   Notification Time [phone]

## 2018-02-18 NOTE — ED PROVIDER NOTES
[facility]     eMERGENCY dEPARTMENT eNCOUnter         Pt Name: Koko Sheikh  MRN: [id number]  [place] 1955  Date of evaluation: 2/17/2018  Provider: Tamara Amaya MD    CHIEF COMPLAINT       Chief Complaint   Patient presents with    Cough    Otalgia       Nurses Notes reviewed and I agree except as noted in the HPI. HISTORY OF PRESENT ILLNESS    Koko Sheikh is a [age] [facility] y.o. female who presents For evaluation of a cough and cold symptoms for 2 days. She has myalgias headache and earache from coughing. She took some Phenergan at home prior to arrival because when she coughs it makes her vomit. She reports a fever of 101 at home. She denies any chest pain or heart problems today. She is short of breath and wheezing. This HPI was provided by the patient. REVIEW OF SYSTEMS     Review of Systems   Constitutional: Positive for chills and fever. Negative for unexpected weight change. HENT: Positive for congestion, ear pain and sore throat. Eyes: Negative for visual disturbance. Respiratory: Positive for cough, shortness of breath and wheezing. Cardiovascular: Negative for chest pain, palpitations and leg swelling. Gastrointestinal: Positive for nausea and vomiting. Negative for abdominal pain, blood in stool and diarrhea. Genitourinary: Negative for dysuria and hematuria. Musculoskeletal: Positive for back pain and myalgias. Negative for arthralgias. Skin: Negative for rash. Allergic/Immunologic: Positive for environmental allergies. Neurological: Positive for dizziness, weakness, light-headedness and headaches. Negative for syncope and numbness. Hematological: Bruises/bleeds easily. Psychiatric/Behavioral: Positive for dysphoric mood. The patient is nervous/anxious. All other systems reviewed and are negative. PAST MEDICAL HISTORY    has a past medical history of Allergic rhinitis; Arthritis; Bipolar disorder ([address]  Cancer ([facility] [address] that the status of her paternal grandmother is unknown. She indicated that the status of her paternal grandfather is unknown. She indicated that the status of her maternal uncle is unknown.    family history includes Cancer in her father, mother, and sister; Depression in her father; Diabetes in her maternal grandmother; Early Death in her maternal grandfather; Heart Attack in her sister; Heart Disease in her father, maternal grandfather, maternal grandmother, maternal uncle, mother, paternal grandfather, and paternal grandmother; High Blood Pressure in her mother; High Cholesterol in her father, maternal grandfather, maternal grandmother, maternal uncle, mother, paternal grandfather, and paternal grandmother; Mental Illness in her father; Stroke in her maternal grandfather. SOCIAL HISTORY      reports that she has been smoking Cigarettes. She started smoking about 40 years ago. She has a 30.00 pack-year smoking history. She has never used smokeless tobacco. She reports that she uses drugs, including Cocaine, about 1 time per week. She reports that she does not drink alcohol. PHYSICAL EXAM       ED Triage Vitals   BP Temp Temp Source Pulse Resp SpO2 Height Weight   02/17/18 2056 02/17/18 2111 02/17/18 2111 02/17/18 2056 02/17/18 2056 02/17/18 2056 -- --   118/72 98.8 °F (37.1 °C) Oral 82 20 94 %        Physical Exam   Constitutional: She is oriented to person, place, and time. Vital signs are normal. She appears well-developed and well-nourished. She is cooperative. Non-toxic appearance. She appears ill. HENT:   Head: Normocephalic. Right Ear: External ear normal. No drainage. Left Ear: External ear normal. No drainage. Nose: Nose normal. No epistaxis. Mouth/Throat: Mucous membranes are not dry and not cyanotic. Eyes: Conjunctivae and EOM are normal. Right eye exhibits no discharge. Left eye exhibits no discharge. No scleral icterus.    Neck: Trachea normal, normal range of motion and phonation protocol and I recommended consideration for admission. I contacted the hospitalist service, Dr. Roxanna Barlow, and he will admit and assume further care and orders for patient at this time. CRITICAL CARE:  There was a high probability of clinically significant/life threatening deterioration in this patient's condition which required my urgent intervention. Total critical care time was 30 minutes. This excludes any time for separately reportable procedures. CONSULTS:  Hospitalist service, Dr. Roxanna Barlow, admitting. PROCEDURES:  None. FINAL IMPRESSION      1. Electrolyte abnormality    2. Hypomagnesemia    3.  Hyponatremia          DISPOSITION/PLAN   DISPOSITION Admitted 02/18/2018 12:13:12 AM    PATIENT REFERRED TO:  David Zhang MD  [address] [place] [address]  [phone]          Eva Stevens, [address] [place] [address]  [phone]        Dr. Marquez Alicea M.D 2/17/18 1:25 AM              Marquez Alicea MD  02/18/18 5276

## 2018-02-19 VITALS
WEIGHT: 176.4 LBS | SYSTOLIC BLOOD PRESSURE: 120 MMHG | DIASTOLIC BLOOD PRESSURE: 84 MMHG | BODY MASS INDEX: 30.11 KG/M2 | HEIGHT: 64 IN | RESPIRATION RATE: 18 BRPM | TEMPERATURE: 98.2 F | OXYGEN SATURATION: 96 % | HEART RATE: 80 BPM

## 2018-02-19 LAB
HCT VFR BLD CALC: 34.4 % (ref 37–47)
HEMOGLOBIN: 11.8 GM/DL (ref 12–16)
MCH RBC QN AUTO: 28.8 PG (ref 27–31)
MCHC RBC AUTO-ENTMCNC: 34.3 GM/DL (ref 33–37)
MCV RBC AUTO: 84 FL (ref 81–99)
PDW BLD-RTO: 15.6 % (ref 11.5–14.5)
PLATELET # BLD: 234 THOU/MM3 (ref 130–400)
PMV BLD AUTO: 8.5 FL (ref 7.4–10.4)
RBC # BLD: 4.09 MILL/MM3 (ref 4.2–5.4)
WBC # BLD: 7.7 THOU/MM3 (ref 4.8–10.8)

## 2018-02-19 PROCEDURE — APPSS60 APP SPLIT SHARED TIME 46-60 MINUTES: Performed by: NURSE PRACTITIONER

## 2018-02-19 PROCEDURE — 6370000000 HC RX 637 (ALT 250 FOR IP): Performed by: INTERNAL MEDICINE

## 2018-02-19 PROCEDURE — 6360000002 HC RX W HCPCS: Performed by: INTERNAL MEDICINE

## 2018-02-19 PROCEDURE — 99239 HOSP IP/OBS DSCHRG MGMT >30: CPT | Performed by: INTERNAL MEDICINE

## 2018-02-19 PROCEDURE — 96366 THER/PROPH/DIAG IV INF ADDON: CPT

## 2018-02-19 PROCEDURE — 96376 TX/PRO/DX INJ SAME DRUG ADON: CPT

## 2018-02-19 PROCEDURE — 36415 COLL VENOUS BLD VENIPUNCTURE: CPT

## 2018-02-19 PROCEDURE — 99223 1ST HOSP IP/OBS HIGH 75: CPT | Performed by: INTERNAL MEDICINE

## 2018-02-19 PROCEDURE — G0378 HOSPITAL OBSERVATION PER HR: HCPCS

## 2018-02-19 PROCEDURE — 94640 AIRWAY INHALATION TREATMENT: CPT

## 2018-02-19 PROCEDURE — 85027 COMPLETE CBC AUTOMATED: CPT

## 2018-02-19 PROCEDURE — G8979 MOBILITY GOAL STATUS: HCPCS

## 2018-02-19 PROCEDURE — 99232 SBSQ HOSP IP/OBS MODERATE 35: CPT | Performed by: INTERNAL MEDICINE

## 2018-02-19 PROCEDURE — 2500000003 HC RX 250 WO HCPCS: Performed by: INTERNAL MEDICINE

## 2018-02-19 PROCEDURE — 97161 PT EVAL LOW COMPLEX 20 MIN: CPT

## 2018-02-19 PROCEDURE — 96372 THER/PROPH/DIAG INJ SC/IM: CPT

## 2018-02-19 PROCEDURE — 2580000003 HC RX 258: Performed by: INTERNAL MEDICINE

## 2018-02-19 PROCEDURE — G8978 MOBILITY CURRENT STATUS: HCPCS

## 2018-02-19 RX ORDER — ESCITALOPRAM OXALATE 20 MG/1
20 TABLET ORAL EVERY MORNING
Status: DISCONTINUED | OUTPATIENT
Start: 2018-02-19 | End: 2018-02-19 | Stop reason: HOSPADM

## 2018-02-19 RX ORDER — PREDNISONE 20 MG/1
40 TABLET ORAL DAILY
Qty: 10 TABLET | Refills: 0 | Status: SHIPPED | OUTPATIENT
Start: 2018-02-20 | End: 2018-02-25

## 2018-02-19 RX ORDER — PREDNISONE 20 MG/1
40 TABLET ORAL DAILY
Status: DISCONTINUED | OUTPATIENT
Start: 2018-02-20 | End: 2018-02-19 | Stop reason: HOSPADM

## 2018-02-19 RX ORDER — AMOXICILLIN AND CLAVULANATE POTASSIUM 875; 125 MG/1; MG/1
1 TABLET, FILM COATED ORAL EVERY 12 HOURS SCHEDULED
Status: DISCONTINUED | OUTPATIENT
Start: 2018-02-19 | End: 2018-02-19 | Stop reason: HOSPADM

## 2018-02-19 RX ORDER — PREDNISONE 20 MG/1
40 TABLET ORAL DAILY
Status: DISCONTINUED | OUTPATIENT
Start: 2018-02-19 | End: 2018-02-19

## 2018-02-19 RX ORDER — HYDROXYZINE PAMOATE 50 MG/1
50 CAPSULE ORAL 3 TIMES DAILY PRN
Status: DISCONTINUED | OUTPATIENT
Start: 2018-02-19 | End: 2018-02-19 | Stop reason: HOSPADM

## 2018-02-19 RX ORDER — BENZONATATE 100 MG/1
100 CAPSULE ORAL 3 TIMES DAILY PRN
Qty: 21 CAPSULE | Refills: 0 | Status: SHIPPED | OUTPATIENT
Start: 2018-02-19 | End: 2018-02-26

## 2018-02-19 RX ORDER — TRAZODONE HYDROCHLORIDE 100 MG/1
100 TABLET ORAL NIGHTLY PRN
Status: DISCONTINUED | OUTPATIENT
Start: 2018-02-19 | End: 2018-02-19 | Stop reason: HOSPADM

## 2018-02-19 RX ORDER — QUETIAPINE 300 MG/1
300 TABLET, FILM COATED, EXTENDED RELEASE ORAL NIGHTLY
Status: DISCONTINUED | OUTPATIENT
Start: 2018-02-19 | End: 2018-02-19 | Stop reason: HOSPADM

## 2018-02-19 RX ORDER — LEVOFLOXACIN 750 MG/1
750 TABLET ORAL NIGHTLY
Status: DISCONTINUED | OUTPATIENT
Start: 2018-02-19 | End: 2018-02-19

## 2018-02-19 RX ADMIN — ACETAMINOPHEN 650 MG: 325 TABLET ORAL at 04:41

## 2018-02-19 RX ADMIN — BENZONATATE 100 MG: 100 CAPSULE ORAL at 04:34

## 2018-02-19 RX ADMIN — IPRATROPIUM BROMIDE AND ALBUTEROL SULFATE 1 AMPULE: .5; 3 SOLUTION RESPIRATORY (INHALATION) at 01:34

## 2018-02-19 RX ADMIN — MORPHINE SULFATE 4 MG: 2 INJECTION, SOLUTION INTRAMUSCULAR; INTRAVENOUS at 01:30

## 2018-02-19 RX ADMIN — BENZONATATE 100 MG: 100 CAPSULE ORAL at 15:02

## 2018-02-19 RX ADMIN — METHYLPREDNISOLONE SODIUM SUCCINATE 40 MG: 40 INJECTION, POWDER, FOR SOLUTION INTRAMUSCULAR; INTRAVENOUS at 11:20

## 2018-02-19 RX ADMIN — NITROGLYCERIN 0.5 INCH: 20 OINTMENT TOPICAL at 04:35

## 2018-02-19 RX ADMIN — ACETAMINOPHEN 650 MG: 325 TABLET ORAL at 10:24

## 2018-02-19 RX ADMIN — LEVOFLOXACIN 750 MG: 5 INJECTION, SOLUTION INTRAVENOUS at 01:30

## 2018-02-19 RX ADMIN — ASPIRIN 325 MG: 325 TABLET, DELAYED RELEASE ORAL at 08:07

## 2018-02-19 RX ADMIN — ENOXAPARIN SODIUM 80 MG: 80 INJECTION SUBCUTANEOUS at 08:07

## 2018-02-19 RX ADMIN — METHYLPREDNISOLONE SODIUM SUCCINATE 40 MG: 40 INJECTION, POWDER, FOR SOLUTION INTRAMUSCULAR; INTRAVENOUS at 01:31

## 2018-02-19 RX ADMIN — DILTIAZEM HYDROCHLORIDE 10 MG: 5 INJECTION INTRAVENOUS at 06:33

## 2018-02-19 RX ADMIN — IPRATROPIUM BROMIDE AND ALBUTEROL SULFATE 1 AMPULE: .5; 3 SOLUTION RESPIRATORY (INHALATION) at 13:05

## 2018-02-19 RX ADMIN — Medication 10 ML: at 08:08

## 2018-02-19 RX ADMIN — IPRATROPIUM BROMIDE AND ALBUTEROL SULFATE 1 AMPULE: .5; 3 SOLUTION RESPIRATORY (INHALATION) at 09:45

## 2018-02-19 ASSESSMENT — PAIN SCALES - GENERAL
PAINLEVEL_OUTOF10: 6
PAINLEVEL_OUTOF10: 7
PAINLEVEL_OUTOF10: 6

## 2018-02-19 ASSESSMENT — PAIN DESCRIPTION - DESCRIPTORS: DESCRIPTORS: HEADACHE

## 2018-02-19 ASSESSMENT — PAIN DESCRIPTION - LOCATION
LOCATION: CHEST;RIB CAGE
LOCATION: CHEST;RIB CAGE

## 2018-02-19 ASSESSMENT — PAIN DESCRIPTION - PAIN TYPE
TYPE: ACUTE PAIN
TYPE: ACUTE PAIN

## 2018-02-19 NOTE — PROGRESS NOTES
[address]  OCCUPATIONAL THERAPY MISSED TREATMENT NOTE  [facility] RENAL TELEMETRY 6K  6K-07/[id number]-A      Date: 2018  Patient Name: Enrique Blanco        CSN: [id number]   : 1955  (63 y.o.)  Gender: female                REASON FOR MISSED TREATMENT:  Per PT, pt getting up on own in room, has no current OT needs at this time. Will defer OT eval, please reorder if change in status.

## 2018-02-19 NOTE — CONSULTS
Colon polyps     COPD (chronic obstructive pulmonary disease) ([facility] [address] 7/24/2014    Depression     Diabetes ([facility] [address] [facility]     HgA1c 7.2 3/2017 - started Metformin and FSBS <150    Diverticulitis of colon     GERD (gastroesophageal reflux disease)     Hiatal hernia     History of colonoscopy 2002    History of kidney stones     Hx of blood clots 6/17/2014    PE and collar bone area after shoulder surgery    Hyperlipidemia     Hypertension     Liver disease     enlarged liver - damaged with alcohol in past but no cirrhosis per patient    Pneumonia 7/24/2014    Suicidal thoughts     2015 admitted to  from [facility]    Thyroid disease     Vomiting       Past Surgical History           Procedure Laterality Date    ABDOMEN SURGERY      x4 removed cysts and ovary removed    CARDIAC SURGERY      heart caths, no stents    CARPAL TUNNEL RELEASE Bilateral 2016    CHOLECYSTECTOMY, LAPAROSCOPIC  6/12/15    Dr. Rolan Valdes, ESOPHAGUS      ELBOW SURGERY Right 10/7/2004    Dr. Del Cid Ours Bilateral 2016    decompression   [facility]    Dr. Roldan Riser -[address] [place] [address] with [address] CUFF REPAIR  2004, 2014    right shoulder    SHOULDER SURGERY  2014    right    SKIN BIOPSY  2006    under left eye     Diet    DIET GENERAL;  Allergies    Seasonal  Social History     Social History     Social History    Marital status:      Spouse name: N/A    Number of children: 3    Years of education: 12     Occupational History    on disability      [employer]     Social History Main Topics    Smoking status: Current Every Day Smoker     Packs/day: 1.00     Years: 30.00     Types: Cigarettes     Start date: 4/22/1977    Smokeless tobacco: Never Used    Alcohol use No      Comment: none for 2 years    Drug use: Yes     Frequency: 1.0 time per week     Types: Cocaine      Comment: last use Nov 2017    Sexual activity: 83.9  84.0   MCH  28.7  28.8   MCHC  34.2  34.3   RDW  16.0*  15.6*   PLT  224  234   MPV  8.3  8.5      BMP  Recent Labs      02/17/18   2129  02/18/18   1017  02/18/18   1025   NA  132*  134*   --    K  3.5  3.8   --    CL  91*  93*   --    CO2  28  26   --    BUN  6*  10   --    CREATININE  0.6  0.7   --    GLUCOSE  98  371*   --    MG  1.0*   --   1.6   CALCIUM  9.5  9.2   --      LFT  No results for input(s): AST, ALT, ALB, BILITOT, ALKPHOS, LIPASE in the last 72 hours. Invalid input(s): AMYLASE  TROP  Lab Results   Component Value Date    TROPONINT < 0.010 02/18/2018    TROPONINT < 0.010 02/18/2018    TROPONINT < 0.010 02/17/2018     BNP  Lab Results   Component Value Date    PROBNP 263.4 02/17/2018    PROBNP 81.5 01/11/2018    PROBNP 61.5 11/16/2017     D-Dimer  Lab Results   Component Value Date    DDIMER < 215.00 02/18/2018     Lactic Acid  No results for input(s): LACTA in the last 72 hours. INR  No results for input(s): INR, PROTIME in the last 72 hours. PTT  No results for input(s): APTT in the last 72 hours. Glucose  No results for input(s): POCGLU in the last 72 hours. UA No results for input(s): SPECGRAV, PHUR, COLORU, CLARITYU, MUCUS, PROTEINU, BLOODU, RBCUA, WBCUA, BACTERIA, NITRU, GLUCOSEU, BILIRUBINUR, UROBILINOGEN, KETUA, LABCAST, LABCASTTY, AMORPHOS in the last 72 hours. Invalid input(s): CRYSTALS. PFTs   None in epic   Echo    11/17/2017  Summary   Normal left ventricle size and systolic function. Ejection fraction was   estimated at 60%.  Doppler parameters were consistent with abnormal left ventricular   relaxation (grade 1 diastolic dysfunction).     Cultures    Procalcitonin  Lab Results   Component Value Date    PROCAL 0.08 02/18/2018     Respiratory culture- pending  Flu A & B - neg   Radiology    CXR 2/17/18  Stable radiographic appearance of the chest. No evidence of an acute process. CT Scans     CT abdomen/pelvis 1/18/18  1.  No acute intra-abdominal or intrapelvic

## 2018-02-19 NOTE — PROGRESS NOTES
Pt amb with decreased jay and stride length, steady without LOB. Distance: 200 feet  Pt refuses stair negotiation, states she'll be fine when she gets home. Activity Tolerance:  Activity Tolerance: Patient Tolerated treatment well    Assessment: Body structures, Functions, Activity limitations: Decreased endurance, Decreased strength  Assessment: Pt tolerates session well today, amb in hallway without AD SBA, is up in room Independently. Pt refuses to perform stair negotiation, stating she'll be fine when she goes home. Pt is currently at baseline, no further PT services warranted. Prognosis: Excellent    Clinical Presentation: Low - Stable and Uncomplicated: Pt tolerates session well today, amb in hallway without AD SBA, is up in room Independently. Pt refuses to perform stair negotiation, stating she'll be fine when she goes home. Pt is currently at baseline, no further PT services warranted. Decision Making: High Complexitybased on patient assessment and decision making process of determining plan of care and establishing reasonable expectations for measurable functional outcomes    REQUIRES PT FOLLOW UP: No  Discharge Recommendations: Home with assist PRN    Patient Education:  Patient Education: POC    Equipment Recommendations:  Equipment Needed: No    Safety:  Type of devices: Call light within reach, Left in bed  Restraints  Initially in place: No    Plan:  Times per week: NA    Goals:  Patient goals : To go home later today. Evaluation Complexity: Based on the findings of patient history, examination, clinical presentation, and decision making during this evaluation, the evaluation of Siobhan Clemente  is of low complexity. PT G-Codes  Functional Limitation: Mobility: Walking and moving around  Mobility: Walking and Moving Around Current Status ():  At least 1 percent but less than 20 percent impaired, limited or restricted  Mobility: Walking and Moving Around Goal Status ():  At least 1 percent but less than 20 percent impaired, limited or restricted     AM-PAC Inpatient Mobility without Stair Climbing Raw Score : 19  AM-PAC Inpatient without Stair Climbing T-Scale Score : 56.04  Mobility Inpatient CMS 0-100% Score: 12.25  Mobility Inpatient without Stair CMS G-Code Modifier : CI

## 2018-02-19 NOTE — PLAN OF CARE
Problem: Pain:  Goal: Pain level will decrease  Pain level will decrease   Outcome: Ongoing  Pt complains of chest pain/rib cage pain that remained above a 6 throughout the night. PRN morphine given throughout night. PRN tylenol remains on MAR. Problem: Falls - Risk of  Goal: Absence of falls  Outcome: Ongoing  No falls this shift. Patient educated on not getting up without help. Falling star protocol in place. Call light in reach. Bed in lowest position. Side rails up x2. Nonskid footwear on. Bed alarm set. Patient visually checked on hourly rounds. Problem: Discharge Planning:  Goal: Discharged to appropriate level of care  Discharged to appropriate level of care   Outcome: Ongoing  Pt plans on returning to group home when medically stable. Problem: Airway Clearance - Ineffective:  Goal: Ability to maintain a clear airway will improve  Ability to maintain a clear airway will improve   Outcome: Ongoing  Pt able to cough and clear secretions. Problem: Cardiac Output - Decreased:  Goal: Hemodynamic stability will improve  Hemodynamic stability will improve   Outcome: Ongoing  Monitoring vital signs. VSS this shift. Comments: Care plan reviewed with patient. Patient verbalizes understanding of the plan of care and contribute to goal setting.

## 2018-02-19 NOTE — CONSULTS
[facility]    Patient's Name/Date of Birth: Cecile Quezada / 1955 (38 y.o.)    Date: February 19, 2018     Referring Provider: Per Garcia MD    Reason for Consultation:   Chief Complaint   Patient presents with    Cough    Otalgia       CHIEF COMPLAINT:  Cough      HPI: This is a pleasant 63 y.o. female presents with cough and ear ache. She has post-tussive nausea and vomiting. Fever to 101F. She is sob and wheezing as well. Hx of COPD. Hx of PE as well in the past post shoulder surgery in 2014. ECG showing NSR with non-specific T-wave abnormality. 11/2017 underwent left heart cath showing no significant CAD. She has negative troponins. UDS came back positive for cocaine.       All labs, EKG's, diagnostic testing and images as well as cardiac cath, stress testing were reviewed during this encounter    Past Medical History:   Diagnosis Date    Allergic rhinitis     Arthritis     spine    Bipolar disorder ([address]     Cancer ([address] 2011    cancerous polyps removed - Dr. Sunitha Adams Colon polyps     COPD (chronic obstructive pulmonary disease) ([address] 7/24/2014    Depression     Diabetes ([address]     HgA1c 7.2 3/2017 - started Metformin and FSBS <150    Diverticulitis of colon     GERD (gastroesophageal reflux disease)     Hiatal hernia     History of colonoscopy 2002    History of kidney stones     Hx of blood clots 6/17/2014    PE and collar bone area after shoulder surgery    Hyperlipidemia     Hypertension     Liver disease     enlarged liver - damaged with alcohol in past but no cirrhosis per patient    Pneumonia 7/24/2014    Suicidal thoughts     2015 admitted to  from [facility]    Thyroid disease     Vomiting      Past Surgical History:   Procedure Laterality Date    ABDOMEN SURGERY      x4 removed cysts and ovary removed    CARDIAC SURGERY      heart caths, no stents    CARPAL TUNNEL RELEASE Bilateral 2016    CHOLECYSTECTOMY, LAPAROSCOPIC Take 1 tablet by mouth every 8 hours as needed for Nausea 12/17/17  Yes Wendy Marquez PA-C   aspirin 81 MG EC tablet Take 1 tablet by mouth daily 12/7/17  Yes Geovanny Narayan MD   escitalopram (LEXAPRO) 20 MG tablet Take 20 mg by mouth every morning   Yes Historical Provider, MD   traZODone (DESYREL) 100 MG tablet Take 100 mg by mouth nightly as needed for Sleep    Yes Historical Provider, MD   clobetasol (TEMOVATE) 0.05 % cream Apply topically 2 times daily to corners of mouth as needed   Yes Historical Provider, MD   nystatin (NYSTATIN) 121314 UNIT/GM powder Apply topically 4 times daily as needed    Yes Historical Provider, MD   TUDOWIL PRESSAIR 400 MCG/ACT AEPB inhaler 1 puff 2 times daily  4/14/17  Yes Historical Provider, MD   lisinopril (PRINIVIL;ZESTRIL) 2.5 MG tablet Take 2.5 mg by mouth daily   Yes Historical Provider, MD   fluticasone (FLONASE) 50 MCG/ACT nasal spray 1 spray by Nasal route daily Each nostril   Yes Historical Provider, MD   ferrous sulfate 325 (65 FE) MG tablet Take 325 mg by mouth daily (with breakfast)    Yes Historical Provider, MD   metFORMIN (GLUCOPHAGE) 500 MG tablet Take 500 mg by mouth 2 times daily (with meals)    Yes Historical Provider, MD   QUEtiapine (SEROQUEL XR) 300 MG extended release tablet Take 300 mg by mouth nightly    Yes Historical Provider, MD   levothyroxine (SYNTHROID) 75 MCG tablet Take 75 mcg by mouth Daily    Yes Historical Provider, MD   pantoprazole (PROTONIX) 40 MG tablet Take 1 tablet by mouth daily Indications: Gastroesophageal Reflux Disease 4/15/16  Yes Amarilis White MD   albuterol (PROVENTIL HFA;VENTOLIN HFA) 108 (90 BASE) MCG/ACT inhaler Inhale 2 puffs into the lungs every 6 hours as needed for Wheezing.    Yes Historical Provider, MD   fluticasone Sherjunior Fields) 50 MCG/ACT nasal spray 1 spray by Nasal route daily 1/30/18   JENN Camacho   Scheduled Meds:   levofloxacin  750 mg Oral Nightly    sodium chloride flush  10 mL Intravenous 2 times per day    ipratropium-albuterol  1 ampule Inhalation Q4H WA    methylPREDNISolone  40 mg Intravenous Q8H    nicotine  1 patch Transdermal Daily    aspirin  325 mg Oral Daily    diltiazem  10 mg Intravenous Q6H    nitroglycerin  0.5 inch Topical 4 times per day    enoxaparin  1 mg/kg Subcutaneous BID    magnesium replacement protocol   Other RX Placeholder     Continuous Infusions:   PRN Meds:.sodium chloride flush, acetaminophen, magnesium hydroxide, ondansetron, magnesium sulfate, potassium chloride **OR** potassium chloride **OR** potassium chloride, ipratropium-albuterol, benzonatate    Allergies   Allergen Reactions    Seasonal Other (See Comments)     sneezing     Family History   Problem Relation Age of Onset    Cancer Mother     Heart Disease Mother     High Blood Pressure Mother     High Cholesterol Mother     Cancer Father      brain    Depression Father     Heart Disease Father     High Cholesterol Father     Mental Illness Father     Cancer Sister     Heart Attack Sister     Heart Disease Maternal Uncle     High Cholesterol Maternal Uncle     Diabetes Maternal Grandmother     Heart Disease Maternal Grandmother     High Cholesterol Maternal Grandmother     Early Death Maternal Grandfather     Heart Disease Maternal Grandfather     High Cholesterol Maternal Grandfather     Stroke Maternal Grandfather     Heart Disease Paternal Grandmother     High Cholesterol Paternal Grandmother     Heart Disease Paternal Grandfather     High Cholesterol Paternal Grandfather      Social History     Social History    Marital status:      Spouse name: N/A    Number of children: 3    Years of education: 12     Occupational History    on disability      [employer]     Social History Main Topics    Smoking status: Current Every Day Smoker     Packs/day: 1.00     Years: 30.00     Types: Cigarettes     Start date: 4/22/1977    Smokeless tobacco: Never Used   Burak Dynálvaro dysfunction). Right Atrium   Right atrial size was normal.      Right Ventricle   The right ventricular size was normal.      Pericardial Effusion   The pericardium was normal in appearance with no evidence of a pericardial   effusion. Pleural Effusion   No evidence of pleural effusion. Aorta / Great Vessels   Aortic root dimension within normal limits.      M-Mode/2D Measurements & Calculations      LV Diastolic    LV Systolic Dimension: 2.4  AV Cusp Separation: 1.9 cmLA   Dimension: 3.8  cm                          Dimension: 3.4 cmAO Root   cm              LV Volume Diastolic: 62 ml  Dimension: 3.1 cm   LV FS:36.8 %    LV Volume Systolic: 96.2 ml   LV PW           LV EDV/LV EDV Index: 62   Diastolic: 1.2  QW/58 I^5EL ESV/LV ESV   cm              Index: 20.2 ml/11 m^2       RV Diastolic Dimension: 3.5 cm   Septum          EF Calculated: 41.5 %   Diastolic: 1.2                              LA/Aorta: 1.1   cm     Doppler Measurements & Calculations      MV Peak E-Wave: 79 cm/s     AV Peak Velocity: 145   MV Peak A-Wave: 87.4 cm/s   cm/s   MV E/A Ratio: 0.9           AV Peak Gradient: 8.41 TV Peak E-Wave: 34.6   MV Peak Gradient: 2.5 mmHg  mmHg                   cm/s                               AV Mean Velocity: 109  TV Peak A-Wave: 36.5   MV Deceleration Time: 275   cm/s                   cm/s   msec                        AV Mean Gradient: 6                               mmHg                   TV Peak Gradient: 0.48                               AV VTI: 31.1 cm        mmHg   MV E' Septal Velocity: 5.56                        TR Velocity:200 cm/s   cm/s                                               TR Gradient:16 mmHg   MV A' Septal Velocity: 9.16                        PV Peak Velocity: 106   cm/s                        IVRT: 88 msec          cm/s   MV E' Lateral Velocity:                            PV Peak Gradient: 4.49   8.09 cm/s                                          mmHg   MV A' Lateral

## 2018-03-07 ENCOUNTER — HOSPITAL ENCOUNTER (OUTPATIENT)
Dept: PULMONOLOGY | Age: 63
Discharge: HOME | End: 2018-03-07
Payer: COMMERCIAL

## 2018-03-07 DIAGNOSIS — J44.9 CHRONIC OBSTRUCTIVE PULMONARY DISEASE, UNSPECIFIED COPD TYPE (HCC): ICD-10-CM

## 2018-03-07 PROCEDURE — 94726 PLETHYSMOGRAPHY LUNG VOLUMES: CPT

## 2018-03-07 PROCEDURE — 94729 DIFFUSING CAPACITY: CPT

## 2018-03-07 PROCEDURE — 94010 BREATHING CAPACITY TEST: CPT

## 2018-03-12 ENCOUNTER — TELEPHONE (OUTPATIENT)
Dept: NEUROLOGY | Age: 63
End: 2018-03-12

## 2018-03-12 NOTE — TELEPHONE ENCOUNTER
Lm to r/s no show 3/12/18 [address]   OPERATIVE REPORT       Name:  Rayna Zuñiga   MR#:  [id number]   :  1945   Account #:  [de-identified]   Date of Adm:  2017       DATE OF PROCEDURE: 2017    PREOPERATIVE DIAGNOSIS: Right lower extremity peripheral   arterial disease with gangrene of right foot. POSTOPERATIVE DIAGNOSIS: Right lower extremity peripheral   arterial disease with gangrene of right foot. PROCEDURE: Right lower extremity arteriogram with third order   select catheterization, ultrasound-guided access. : Arsenio Be MD    ANESTHESIA: MAC with local.    ESTIMATED BLOOD LOSS: Less than 10 mL. STATEMENT OF MEDICAL NECESSITY: The patient is a 62-year-old man   with chronic kidney disease and peripheral arterial disease who   presented with an ischemic right toe that required amputation by   the general surgery service. The wound is not healing properly   and appears to be ischemic. Preprocedural Doppler studies   confirm that he has significant peripheral arterial disease and   the toe pressures were marginal. Since the healing is not   proceeding adequately, an arteriogram and possible intervention   are planned. DESCRIPTION OF PROCEDURE: The patient was taken to the angio   suite in supine position. Monitored anesthesia care was   provided. The groins were prepped and draped in the usual   sterile fashion. The left common femoral artery was identified   with a combination of fluoroscopic marking of the left femoral   head, as well as direct ultrasound-guided access. The overlying   skin at the left groin was anesthetized with 1% Xylocaine local.   Using a micropuncture needle under direct ultrasound guidance,   the left common femoral artery was entered. The micropuncture   wire was advanced under fluoroscopic guidance and advanced   easily into the distal abdominal aorta.  The micropuncture   catheter was placed and used to place a 0.035 Bentson wire into   the abdominal aorta. A 6-[place] Islander Matteo sheath was then placed and   positioned at the level of the left common iliac artery. A 5-  [place] Island[place] Omni Flush catheter was then used to select the right   common iliac artery, and a hand injection arteriogram of the   right iliac and right femoral bifurcation was performed. This   was done with a right anterior oblique projection. A 0.035 Glidewire was then advanced easily under fluoroscopic   guidance through the catheter and into the right superficial   femoral artery. The catheter was removed and a 5-[place] Islander straight   Christal Atkinson was then placed over the wire into the right   superficial femoral artery. The sheath was then advanced   carefully over the Glidecatheter and into the distal right   external iliac artery. The catheter was removed, leaving the wire in place, and a   series of arteriograms was performed of the right lower   extremity using digital subtraction technique and half-strength   Visipaque contrast to the level of the knee. Next, the catheter   was reintroduced and positioned approximately at the level of   the knee joint, so that direct injection of the tibial arteries   could be performed. This was then done in stations all the way   through the foot. For the reasons anatomically that will   be outlined below, no intervention was performed. Essentially,   there was severe nonreconstructable distal tibial disease of the   right foot. The catheter and sheath were pulled back to the left   side. A 0.035 Bentson wire was then replaced into the abdominal   aorta. The longer sheath was removed and a short 6-[place] Islander sheath   was put in its place. The short [place] sheath was then pulled   with manual compression, held for hemostasis. ANGIOGRAPHIC FINDINGS   1. The right common, external and internal iliac arteries are   widely patent without stenosis. There is a moderate degree of   tortuosity of the mid right external iliac artery, but no   stenosis.    2. The right common femoral, profunda femoris, and superficial   femoral arteries are all normal without atherosclerotic changes   or stenosis. 3. Tibial station runoff shows first of all a patent popliteal   artery, even below the knee. Tibial vessels, however, are   severely diseased. The proximal peroneal and posterior tibial   arteries are occluded. The proximal anterior tibial artery is   patent, but there is an approximately 60% focal stenosis in the   mid portion of the anterior tibial artery. Distally, things are   much worse. The anterior tibial artery occludes well above the   ankle and reconstitutes a very severely diseased small pruned   dorsalis pedis artery with multiple significant atherosclerotic   lesions and no reconstitution of a pedal arch. There are   otherwise only unnamed collaterals providing blood flow to the   right foot. Because of these findings, no intervention could be   performed with any reasonable expectation of success. The   procedure was then terminated as outlined above. FINAL IMPRESSION: Severe distal tibial disease, not amenable to   revascularization.         MD CLAIRE Gomez / VEENA   D:  01/18/2017   16:45   T:  01/19/2017   06:20   Job #:  [id number]

## 2018-03-14 ENCOUNTER — OFFICE VISIT (OUTPATIENT)
Dept: CARDIOLOGY CLINIC | Age: 63
End: 2018-03-14
Payer: COMMERCIAL

## 2018-03-14 VITALS
HEIGHT: 64 IN | DIASTOLIC BLOOD PRESSURE: 76 MMHG | BODY MASS INDEX: 29.28 KG/M2 | SYSTOLIC BLOOD PRESSURE: 134 MMHG | WEIGHT: 171.5 LBS | HEART RATE: 81 BPM

## 2018-03-14 DIAGNOSIS — E78.5 DYSLIPIDEMIA: ICD-10-CM

## 2018-03-14 DIAGNOSIS — F17.200 TOBACCO USE DISORDER: ICD-10-CM

## 2018-03-14 DIAGNOSIS — I10 ESSENTIAL HYPERTENSION: ICD-10-CM

## 2018-03-14 DIAGNOSIS — I25.10 CORONARY ARTERY DISEASE INVOLVING NATIVE CORONARY ARTERY OF NATIVE HEART WITHOUT ANGINA PECTORIS: Primary | ICD-10-CM

## 2018-03-14 PROCEDURE — 99213 OFFICE O/P EST LOW 20 MIN: CPT | Performed by: PHYSICIAN ASSISTANT

## 2018-03-14 PROCEDURE — 1111F DSCHRG MED/CURRENT MED MERGE: CPT | Performed by: PHYSICIAN ASSISTANT

## 2018-03-14 PROCEDURE — 3014F SCREEN MAMMO DOC REV: CPT | Performed by: PHYSICIAN ASSISTANT

## 2018-03-14 PROCEDURE — G8484 FLU IMMUNIZE NO ADMIN: HCPCS | Performed by: PHYSICIAN ASSISTANT

## 2018-03-14 PROCEDURE — 3017F COLORECTAL CA SCREEN DOC REV: CPT | Performed by: PHYSICIAN ASSISTANT

## 2018-03-14 PROCEDURE — G8419 CALC BMI OUT NRM PARAM NOF/U: HCPCS | Performed by: PHYSICIAN ASSISTANT

## 2018-03-14 PROCEDURE — G8598 ASA/ANTIPLAT THER USED: HCPCS | Performed by: PHYSICIAN ASSISTANT

## 2018-03-14 PROCEDURE — 4004F PT TOBACCO SCREEN RCVD TLK: CPT | Performed by: PHYSICIAN ASSISTANT

## 2018-03-14 PROCEDURE — G8427 DOCREV CUR MEDS BY ELIG CLIN: HCPCS | Performed by: PHYSICIAN ASSISTANT

## 2018-03-14 RX ORDER — BENZONATATE 100 MG/1
100 CAPSULE ORAL 3 TIMES DAILY PRN
COMMUNITY
End: 2018-03-14

## 2018-03-14 RX ORDER — ISOSORBIDE DINITRATE 10 MG/1
10 TABLET ORAL 3 TIMES DAILY
Qty: 270 TABLET | Refills: 1 | Status: SHIPPED | OUTPATIENT
Start: 2018-03-14 | End: 2018-09-21 | Stop reason: SDUPTHER

## 2018-03-14 RX ORDER — CARVEDILOL 3.12 MG/1
3.12 TABLET ORAL 2 TIMES DAILY WITH MEALS
Qty: 180 TABLET | Refills: 1 | Status: SHIPPED | OUTPATIENT
Start: 2018-03-14 | End: 2018-09-21 | Stop reason: SDUPTHER

## 2018-03-14 NOTE — PROGRESS NOTES
Allergies   Allergen Reactions    Seasonal Other (See Comments)     sneezing       Current Outpatient Prescriptions   Medication Sig Dispense Refill    carvedilol (COREG) 3.125 MG tablet Take 1 tablet by mouth 2 times daily (with meals) 180 tablet 1    isosorbide dinitrate (ISORDIL) 10 MG tablet Take 1 tablet by mouth 3 times daily 270 tablet 1    clopidogrel (PLAVIX) 75 MG tablet TAKE 1 TABLET BY MOUTH DAILY 30 tablet 11    fluticasone (FLONASE) 50 MCG/ACT nasal spray 1 spray by Nasal route daily 1 Bottle 3    docusate sodium (COLACE, DULCOLAX) 100 MG CAPS Take 100 mg by mouth 2 times daily 30 capsule 0    hydrochlorothiazide (HYDRODIURIL) 25 MG tablet Take 50 mg by mouth daily      hydrOXYzine (VISTARIL) 50 MG capsule Take 50 mg by mouth 3 times daily as needed for Anxiety      acetaminophen (APAP EXTRA STRENGTH) 500 MG tablet Take 1 tablet by mouth every 6 hours as needed for Pain 120 tablet 0    ondansetron (ZOFRAN ODT) 4 MG disintegrating tablet Take 1 tablet by mouth every 8 hours as needed for Nausea 20 tablet 0    aspirin 81 MG EC tablet Take 1 tablet by mouth daily 30 tablet 0    escitalopram (LEXAPRO) 20 MG tablet Take 20 mg by mouth every morning      traZODone (DESYREL) 100 MG tablet Take 100 mg by mouth nightly as needed for Sleep       clobetasol (TEMOVATE) 0.05 % cream Apply topically 2 times daily to corners of mouth as needed      nystatin (NYSTATIN) 747342 UNIT/GM powder Apply topically 4 times daily as needed       TUDORZA PRESSAIR 400 MCG/ACT AEPB inhaler 1 puff 2 times daily   0    lisinopril (PRINIVIL;ZESTRIL) 2.5 MG tablet Take 2.5 mg by mouth daily      fluticasone (FLONASE) 50 MCG/ACT nasal spray 1 spray by Nasal route daily Each nostril      ferrous sulfate 325 (65 FE) MG tablet Take 325 mg by mouth daily (with breakfast)       metFORMIN (GLUCOPHAGE) 500 MG tablet Take 500 mg by mouth 2 times daily (with meals)       QUEtiapine (SEROQUEL XR) 300 MG extended with her GI doctor in regards to her epigastric pain. Continue same current medications and with constant vigilance to changes in symptoms and also any potential side effects. Return for care if become worse or seek medical attention immediately. The patient is educated on symptoms of heart disease that include chest pain and passing out, dizziness, etc and to report them if there is any change or go to emergency room.        Follow up with Dr Lito Jordan as scheduled or sooner if needed  (Please note that portions of this note were completed with a voice recognition program.  Efforts were made to edit the dictation but occasionally words are mis-transcribed.)      Ja Betancourt PA-C

## 2018-03-15 ENCOUNTER — HOSPITAL ENCOUNTER (OUTPATIENT)
Dept: WOMENS IMAGING | Age: 63
Discharge: HOME | End: 2018-03-15
Payer: COMMERCIAL

## 2018-03-15 ENCOUNTER — HOSPITAL ENCOUNTER (OUTPATIENT)
Dept: CT IMAGING | Age: 63
Discharge: HOME | End: 2018-03-15
Payer: COMMERCIAL

## 2018-03-15 DIAGNOSIS — Z13.9 VISIT FOR SCREENING: ICD-10-CM

## 2018-03-15 DIAGNOSIS — J32.9 CHRONIC SINUSITIS, UNSPECIFIED LOCATION: ICD-10-CM

## 2018-03-15 DIAGNOSIS — J34.89 NASAL OBSTRUCTION: ICD-10-CM

## 2018-03-15 DIAGNOSIS — R09.81 NASAL CONGESTION: ICD-10-CM

## 2018-03-15 DIAGNOSIS — J34.89 SINUS PRESSURE: ICD-10-CM

## 2018-03-15 PROCEDURE — 77063 BREAST TOMOSYNTHESIS BI: CPT

## 2018-03-15 PROCEDURE — 70486 CT MAXILLOFACIAL W/O DYE: CPT

## 2018-03-20 ENCOUNTER — OFFICE VISIT (OUTPATIENT)
Dept: ENT CLINIC | Age: 63
End: 2018-03-20
Payer: COMMERCIAL

## 2018-03-20 VITALS
HEIGHT: 64 IN | WEIGHT: 172.8 LBS | RESPIRATION RATE: 12 BRPM | BODY MASS INDEX: 29.5 KG/M2 | TEMPERATURE: 97.9 F | SYSTOLIC BLOOD PRESSURE: 124 MMHG | HEART RATE: 88 BPM | DIASTOLIC BLOOD PRESSURE: 66 MMHG

## 2018-03-20 DIAGNOSIS — J34.3 HYPERTROPHY OF INFERIOR NASAL TURBINATE: ICD-10-CM

## 2018-03-20 DIAGNOSIS — J34.2 NASAL SEPTAL DEVIATION: Primary | ICD-10-CM

## 2018-03-20 DIAGNOSIS — H90.3 ASYMMETRICAL SENSORINEURAL HEARING LOSS: ICD-10-CM

## 2018-03-20 DIAGNOSIS — H93.12 TINNITUS, LEFT EAR: ICD-10-CM

## 2018-03-20 DIAGNOSIS — R51.9 RHINOGENIC HEADACHE: ICD-10-CM

## 2018-03-20 PROCEDURE — G8419 CALC BMI OUT NRM PARAM NOF/U: HCPCS | Performed by: OTOLARYNGOLOGY

## 2018-03-20 PROCEDURE — 99214 OFFICE O/P EST MOD 30 MIN: CPT | Performed by: OTOLARYNGOLOGY

## 2018-03-20 PROCEDURE — 1111F DSCHRG MED/CURRENT MED MERGE: CPT | Performed by: OTOLARYNGOLOGY

## 2018-03-20 PROCEDURE — G8427 DOCREV CUR MEDS BY ELIG CLIN: HCPCS | Performed by: OTOLARYNGOLOGY

## 2018-03-20 PROCEDURE — 4004F PT TOBACCO SCREEN RCVD TLK: CPT | Performed by: OTOLARYNGOLOGY

## 2018-03-20 PROCEDURE — 3017F COLORECTAL CA SCREEN DOC REV: CPT | Performed by: OTOLARYNGOLOGY

## 2018-03-20 PROCEDURE — G8484 FLU IMMUNIZE NO ADMIN: HCPCS | Performed by: OTOLARYNGOLOGY

## 2018-03-20 PROCEDURE — 3014F SCREEN MAMMO DOC REV: CPT | Performed by: OTOLARYNGOLOGY

## 2018-03-20 PROCEDURE — G8598 ASA/ANTIPLAT THER USED: HCPCS | Performed by: OTOLARYNGOLOGY

## 2018-03-20 ASSESSMENT — ENCOUNTER SYMPTOMS
COUGH: 0
TROUBLE SWALLOWING: 0
WHEEZING: 0
STRIDOR: 0
APNEA: 0
SORE THROAT: 0
VOMITING: 0
DIARRHEA: 0
NAUSEA: 0
RHINORRHEA: 0
FACIAL SWELLING: 0
COLOR CHANGE: 0
VOICE CHANGE: 0
ABDOMINAL PAIN: 0
SHORTNESS OF BREATH: 0
CHOKING: 0
SINUS PRESSURE: 0
CHEST TIGHTNESS: 0

## 2018-03-20 NOTE — LETTER
[facility]  [facility]  [id number] [facility], located within [facility]  Phone: [phone]  Fax: [phone]    Marc Freeman MD        March 20, 2018    KESH[facility] [place]  [place] [address] [place] [address]    Patient: Allison Isabel   MR Number: [id number]   YOB: 1955   Date of Visit: 3/20/2018     Dear Surya Schwartz,    I recently saw your patient, Allison Isabel, regarding Her sinus CT results. She has a deviated septum spur impacting her turbinates causing both nasal obstruction and rhinogenic headache. She would like this repaired. Below are the relevant portions of my assessment and plan of care. Assessment & Plan   Diagnoses and all orders for this visit:    1. Nasal septal deviation  MD REPAIR OF NASAL SEPTUM   2. Hypertrophy of left inferior nasal turbinate  MD EXCISION TURBINATE,SUBMUCOUS   3. Rhinogenic headache  MD REPAIR OF NASAL SEPTUM   4. Asymmetrical sensorineural hearing loss  Audiometry with tympanometry   5. Tinnitus, left ear  Audiometry with tympanometry       The findings were explained and her questions were answered. Reviewed the CT with the patient explained the findings and other related to her symptoms. She wishes to proceed with surgical correction. Recommended surgical procedures are:  Septoplasty  Partial submucous resection right inferior turbinate  Time estimate 1.5 hours    Informed consent: Septoplasty complications that may occur were discussed including postoperative bleeding (usually easy to control), infection, nasal crusting, a hole in the septum (perforation), failure to completely improve breathing, persistent septal deflection resulting in the need for revision (uncommon), and very rarely, a change in appearance. They understand that no guarantees are made regarding either outcome or potential complications. They read the patient information sheet and were given a copy to take with them. All of their questions were answered.  They

## 2018-03-20 NOTE — PROGRESS NOTES
[facility]  [facility]  [address] [address]  Dept: [phone]  Dept Fax: [phone]  Loc: [phone]    Josh Salgado is a 63 y.o. female who was referred by No ref. provider found for:  Chief Complaint   Patient presents with    Follow-up     Patient is here for a follow up after CT scan. Daniella Garza HPI:     Josh Salgado is a 63 y.o. female who presents today for Review of her CT scan. She has had chronic mid face headaches for years aggravated by nasal congestion. She has completed a month of antibiotics irrigations with no improvement. CT did not show any sinusitis of any significance and also reviewed head CT from last year that has similar findings. When she does have is a septal deviation with spur impacting the right middle turbinate, hypertrophy left inferior turbinate, along with some impactions posteriorly and high from the ethmoids. This could combined to cause nasal congestion and the headaches that she describes. .    She also complains of severe ringing in her left ear she has known sensorineural hearing loss. Last audio was 2 years ago. History:      Allergies   Allergen Reactions    Seasonal Other (See Comments)     sneezing     Current Outpatient Prescriptions   Medication Sig Dispense Refill    carvedilol (COREG) 3.125 MG tablet Take 1 tablet by mouth 2 times daily (with meals) 180 tablet 1    isosorbide dinitrate (ISORDIL) 10 MG tablet Take 1 tablet by mouth 3 times daily 270 tablet 1    clopidogrel (PLAVIX) 75 MG tablet TAKE 1 TABLET BY MOUTH DAILY 30 tablet 11    fluticasone (FLONASE) 50 MCG/ACT nasal spray 1 spray by Nasal route daily 1 Bottle 3    docusate sodium (COLACE, DULCOLAX) 100 MG CAPS Take 100 mg by mouth 2 times daily 30 capsule 0    hydrochlorothiazide (HYDRODIURIL) 25 MG tablet Take 50 mg by mouth daily      sucralfate (CARAFATE) 1 GM/10ML suspension Take 1 g by mouth 4 times daily      Neurological: She is alert and oriented to person, place, and time. No cranial nerve deficit (VIIth N function intact bilat). Psychiatric: She has a normal mood and affect. Nursing note and vitals reviewed. Data:  All of the past medical history, past surgical history, family history, social history, allergies and current medications were reviewed with the patient. Assessment & Plan   Diagnoses and all orders for this visit:    1. Nasal septal deviation  MI REPAIR OF NASAL SEPTUM   2. Hypertrophy of left inferior nasal turbinate  MI EXCISION TURBINATE,SUBMUCOUS   3. Rhinogenic headache  MI REPAIR OF NASAL SEPTUM   4. Asymmetrical sensorineural hearing loss  Audiometry with tympanometry   5. Tinnitus, left ear  Audiometry with tympanometry       The findings were explained and her questions were answered. Reviewed the CT with the patient explained the findings and other related to her symptoms. She wishes to proceed with surgical correction. Recommended surgical procedures are:  Septoplasty  Partial submucous resection right inferior turbinate  Time estimate 1.5 hours    Informed consent: Septoplasty complications that may occur were discussed including postoperative bleeding (usually easy to control), infection, nasal crusting, a hole in the septum (perforation), failure to completely improve breathing, persistent septal deflection resulting in the need for revision (uncommon), and very rarely, a change in appearance. They understand that no guarantees are made regarding either outcome or potential complications. They read the patient information sheet and were given a copy to take with them. All of their questions were answered. They requested we proceed. Patient will need to hold the metformin and lisinopril for 2 days. She needs to be off aspirin and Plavix for one week.   She'll need medical clearances including PCP and cardiology, especially to be off anticoagulants    Repeat audiogram is

## 2018-03-21 ENCOUNTER — TELEPHONE (OUTPATIENT)
Dept: CARDIOLOGY CLINIC | Age: 63
End: 2018-03-21

## 2018-03-27 ENCOUNTER — HOSPITAL ENCOUNTER (OUTPATIENT)
Dept: WOMENS IMAGING | Age: 63
Discharge: HOME | End: 2018-03-27
Payer: COMMERCIAL

## 2018-03-27 DIAGNOSIS — R92.2 BREAST DENSITY: ICD-10-CM

## 2018-03-27 PROCEDURE — 76642 ULTRASOUND BREAST LIMITED: CPT

## 2018-03-27 PROCEDURE — G0279 TOMOSYNTHESIS, MAMMO: HCPCS

## 2018-04-10 ENCOUNTER — OFFICE VISIT (OUTPATIENT)
Dept: CARDIOLOGY CLINIC | Age: 63
End: 2018-04-10
Payer: COMMERCIAL

## 2018-04-10 VITALS
HEART RATE: 79 BPM | DIASTOLIC BLOOD PRESSURE: 84 MMHG | HEIGHT: 64 IN | SYSTOLIC BLOOD PRESSURE: 132 MMHG | BODY MASS INDEX: 29.88 KG/M2 | WEIGHT: 175 LBS

## 2018-04-10 DIAGNOSIS — Z01.810 PREOPERATIVE CARDIOVASCULAR EXAMINATION: ICD-10-CM

## 2018-04-10 DIAGNOSIS — I25.10 CORONARY ARTERY DISEASE INVOLVING NATIVE CORONARY ARTERY OF NATIVE HEART WITHOUT ANGINA PECTORIS: Primary | ICD-10-CM

## 2018-04-10 PROCEDURE — G8598 ASA/ANTIPLAT THER USED: HCPCS | Performed by: INTERNAL MEDICINE

## 2018-04-10 PROCEDURE — 3017F COLORECTAL CA SCREEN DOC REV: CPT | Performed by: INTERNAL MEDICINE

## 2018-04-10 PROCEDURE — 3014F SCREEN MAMMO DOC REV: CPT | Performed by: INTERNAL MEDICINE

## 2018-04-10 PROCEDURE — G8417 CALC BMI ABV UP PARAM F/U: HCPCS | Performed by: INTERNAL MEDICINE

## 2018-04-10 PROCEDURE — 99213 OFFICE O/P EST LOW 20 MIN: CPT | Performed by: INTERNAL MEDICINE

## 2018-04-10 PROCEDURE — G8427 DOCREV CUR MEDS BY ELIG CLIN: HCPCS | Performed by: INTERNAL MEDICINE

## 2018-04-10 PROCEDURE — 4004F PT TOBACCO SCREEN RCVD TLK: CPT | Performed by: INTERNAL MEDICINE

## 2018-04-10 ASSESSMENT — ENCOUNTER SYMPTOMS
NAUSEA: 0
APNEA: 0
EYE PAIN: 0
SORE THROAT: 0
ABDOMINAL DISTENTION: 0
EYE ITCHING: 0
CHEST TIGHTNESS: 0
DIARRHEA: 0
COUGH: 0
SINUS PRESSURE: 0
BACK PAIN: 0
ABDOMINAL PAIN: 0
EYE REDNESS: 0
CONSTIPATION: 0
EYE DISCHARGE: 0
BLOOD IN STOOL: 0
SHORTNESS OF BREATH: 0

## 2018-04-11 ENCOUNTER — HOSPITAL ENCOUNTER (OUTPATIENT)
Dept: PHYSICAL THERAPY | Age: 63
Setting detail: THERAPIES SERIES
Discharge: HOME | End: 2018-04-11
Payer: COMMERCIAL

## 2018-04-11 PROCEDURE — 97162 PT EVAL MOD COMPLEX 30 MIN: CPT

## 2018-04-11 PROCEDURE — 97110 THERAPEUTIC EXERCISES: CPT

## 2018-04-11 ASSESSMENT — PAIN DESCRIPTION - FREQUENCY: FREQUENCY: CONTINUOUS

## 2018-04-11 ASSESSMENT — PAIN SCALES - GENERAL: PAINLEVEL_OUTOF10: 7

## 2018-04-11 ASSESSMENT — PAIN DESCRIPTION - DESCRIPTORS: DESCRIPTORS: BURNING

## 2018-04-11 ASSESSMENT — PAIN DESCRIPTION - LOCATION: LOCATION: BACK;PELVIS

## 2018-04-11 ASSESSMENT — PAIN DESCRIPTION - PROGRESSION: CLINICAL_PROGRESSION: GRADUALLY WORSENING

## 2018-04-11 ASSESSMENT — PAIN DESCRIPTION - ORIENTATION: ORIENTATION: RIGHT;LEFT

## 2018-04-11 ASSESSMENT — PAIN DESCRIPTION - PAIN TYPE: TYPE: CHRONIC PAIN

## 2018-04-16 ENCOUNTER — HOSPITAL ENCOUNTER (OUTPATIENT)
Dept: PHYSICAL THERAPY | Age: 63
Setting detail: THERAPIES SERIES
End: 2018-04-16
Payer: COMMERCIAL

## 2018-04-19 ENCOUNTER — HOSPITAL ENCOUNTER (OUTPATIENT)
Dept: PHYSICAL THERAPY | Age: 63
Setting detail: THERAPIES SERIES
Discharge: HOME | End: 2018-04-19
Payer: COMMERCIAL

## 2018-04-19 ENCOUNTER — HOSPITAL ENCOUNTER (OUTPATIENT)
Age: 63
Discharge: HOME | End: 2018-04-19
Payer: COMMERCIAL

## 2018-04-19 LAB
ALBUMIN SERPL-MCNC: 4.1 G/DL (ref 3.5–5.1)
ALP BLD-CCNC: 83 U/L (ref 38–126)
ALT SERPL-CCNC: 28 U/L (ref 11–66)
ANION GAP SERPL CALCULATED.3IONS-SCNC: 15 MEQ/L (ref 8–16)
ANISOCYTOSIS: ABNORMAL
AST SERPL-CCNC: 23 U/L (ref 5–40)
AVERAGE GLUCOSE: 135 MG/DL (ref 70–126)
BASOPHILS # BLD: 0.8 %
BASOPHILS ABSOLUTE: 0.1 THOU/MM3 (ref 0–0.1)
BILIRUB SERPL-MCNC: 0.2 MG/DL (ref 0.3–1.2)
BUN BLDV-MCNC: 6 MG/DL (ref 7–22)
CALCIUM SERPL-MCNC: 10.5 MG/DL (ref 8.5–10.5)
CHLORIDE BLD-SCNC: 101 MEQ/L (ref 98–111)
CO2: 23 MEQ/L (ref 23–33)
CREAT SERPL-MCNC: 0.7 MG/DL (ref 0.4–1.2)
EOSINOPHIL # BLD: 0.6 %
EOSINOPHILS ABSOLUTE: 0 THOU/MM3 (ref 0–0.4)
GFR SERPL CREATININE-BSD FRML MDRD: 85 ML/MIN/1.73M2
GLUCOSE BLD-MCNC: 119 MG/DL (ref 70–108)
HBA1C MFR BLD: 6.5 % (ref 4.4–6.4)
HCT VFR BLD CALC: 40.9 % (ref 37–47)
HEMOGLOBIN: 13.8 GM/DL (ref 12–16)
LYMPHOCYTES # BLD: 36.6 %
LYMPHOCYTES ABSOLUTE: 2.5 THOU/MM3 (ref 1–4.8)
MCH RBC QN AUTO: 28.5 PG (ref 27–31)
MCHC RBC AUTO-ENTMCNC: 33.7 GM/DL (ref 33–37)
MCV RBC AUTO: 84.4 FL (ref 81–99)
MONOCYTES # BLD: 4.7 %
MONOCYTES ABSOLUTE: 0.3 THOU/MM3 (ref 0.4–1.3)
NUCLEATED RED BLOOD CELLS: 0 /100 WBC
PDW BLD-RTO: 14.6 % (ref 11.5–14.5)
PLATELET # BLD: 321 THOU/MM3 (ref 130–400)
PMV BLD AUTO: 7.8 FL (ref 7.4–10.4)
POTASSIUM SERPL-SCNC: 4.7 MEQ/L (ref 3.5–5.2)
RBC # BLD: 4.84 MILL/MM3 (ref 4.2–5.4)
SEG NEUTROPHILS: 57.3 %
SEGMENTED NEUTROPHILS ABSOLUTE COUNT: 3.8 THOU/MM3 (ref 1.8–7.7)
SODIUM BLD-SCNC: 139 MEQ/L (ref 135–145)
TOTAL PROTEIN: 7.3 G/DL (ref 6.1–8)
WBC # BLD: 6.7 THOU/MM3 (ref 4.8–10.8)

## 2018-04-19 PROCEDURE — 80053 COMPREHEN METABOLIC PANEL: CPT

## 2018-04-19 PROCEDURE — 97110 THERAPEUTIC EXERCISES: CPT

## 2018-04-19 PROCEDURE — 36415 COLL VENOUS BLD VENIPUNCTURE: CPT

## 2018-04-19 PROCEDURE — 85025 COMPLETE CBC W/AUTO DIFF WBC: CPT

## 2018-04-19 PROCEDURE — 83036 HEMOGLOBIN GLYCOSYLATED A1C: CPT

## 2018-04-19 ASSESSMENT — PAIN SCALES - GENERAL: PAINLEVEL_OUTOF10: 5

## 2018-04-23 ENCOUNTER — APPOINTMENT (OUTPATIENT)
Dept: GENERAL RADIOLOGY | Age: 63
End: 2018-04-23
Payer: COMMERCIAL

## 2018-04-23 ENCOUNTER — HOSPITAL ENCOUNTER (OUTPATIENT)
Dept: PHYSICAL THERAPY | Age: 63
Setting detail: THERAPIES SERIES
Discharge: HOME | End: 2018-04-23
Payer: COMMERCIAL

## 2018-04-23 ENCOUNTER — HOSPITAL ENCOUNTER (EMERGENCY)
Age: 63
Discharge: HOME | End: 2018-04-23
Attending: EMERGENCY MEDICINE
Payer: COMMERCIAL

## 2018-04-23 VITALS
WEIGHT: 174 LBS | HEART RATE: 77 BPM | HEIGHT: 64 IN | SYSTOLIC BLOOD PRESSURE: 152 MMHG | RESPIRATION RATE: 16 BRPM | DIASTOLIC BLOOD PRESSURE: 78 MMHG | TEMPERATURE: 98.7 F | OXYGEN SATURATION: 96 % | BODY MASS INDEX: 29.71 KG/M2

## 2018-04-23 DIAGNOSIS — M54.2 CERVICAL PAIN (NECK): ICD-10-CM

## 2018-04-23 DIAGNOSIS — R07.9 CHEST PAIN, UNSPECIFIED TYPE: Primary | ICD-10-CM

## 2018-04-23 LAB
ALBUMIN SERPL-MCNC: 4.2 G/DL (ref 3.5–5.1)
ALP BLD-CCNC: 89 U/L (ref 38–126)
ALT SERPL-CCNC: 21 U/L (ref 11–66)
ANION GAP SERPL CALCULATED.3IONS-SCNC: 10 MEQ/L (ref 8–16)
AST SERPL-CCNC: 15 U/L (ref 5–40)
BASOPHILS # BLD: 0.6 %
BASOPHILS ABSOLUTE: 0 THOU/MM3 (ref 0–0.1)
BILIRUB SERPL-MCNC: 0.2 MG/DL (ref 0.3–1.2)
BUN BLDV-MCNC: 5 MG/DL (ref 7–22)
CALCIUM SERPL-MCNC: 10.5 MG/DL (ref 8.5–10.5)
CHLORIDE BLD-SCNC: 101 MEQ/L (ref 98–111)
CO2: 26 MEQ/L (ref 23–33)
CREAT SERPL-MCNC: 0.5 MG/DL (ref 0.4–1.2)
EKG ATRIAL RATE: 78 BPM
EKG P AXIS: 34 DEGREES
EKG P-R INTERVAL: 130 MS
EKG Q-T INTERVAL: 414 MS
EKG QRS DURATION: 78 MS
EKG QTC CALCULATION (BAZETT): 471 MS
EKG T AXIS: 54 DEGREES
EKG VENTRICULAR RATE: 78 BPM
EOSINOPHIL # BLD: 0.8 %
EOSINOPHILS ABSOLUTE: 0.1 THOU/MM3 (ref 0–0.4)
GFR SERPL CREATININE-BSD FRML MDRD: > 90 ML/MIN/1.73M2
GLUCOSE BLD-MCNC: 82 MG/DL (ref 70–108)
HCT VFR BLD CALC: 39.2 % (ref 37–47)
HEMOGLOBIN: 13.4 GM/DL (ref 12–16)
LYMPHOCYTES # BLD: 43.8 %
LYMPHOCYTES ABSOLUTE: 3 THOU/MM3 (ref 1–4.8)
MCH RBC QN AUTO: 28.5 PG (ref 27–31)
MCHC RBC AUTO-ENTMCNC: 34.2 GM/DL (ref 33–37)
MCV RBC AUTO: 83.2 FL (ref 81–99)
MONOCYTES # BLD: 5.4 %
MONOCYTES ABSOLUTE: 0.4 THOU/MM3 (ref 0.4–1.3)
NUCLEATED RED BLOOD CELLS: 0 /100 WBC
OSMOLALITY CALCULATION: 270.2 MOSMOL/KG (ref 275–300)
PDW BLD-RTO: 14 % (ref 11.5–14.5)
PLATELET # BLD: 321 THOU/MM3 (ref 130–400)
PMV BLD AUTO: 7.7 FL (ref 7.4–10.4)
POTASSIUM SERPL-SCNC: 4.1 MEQ/L (ref 3.5–5.2)
PRO-BNP: 98.3 PG/ML (ref 0–900)
RBC # BLD: 4.71 MILL/MM3 (ref 4.2–5.4)
SEG NEUTROPHILS: 49.4 %
SEGMENTED NEUTROPHILS ABSOLUTE COUNT: 3.4 THOU/MM3 (ref 1.8–7.7)
SODIUM BLD-SCNC: 137 MEQ/L (ref 135–145)
TOTAL PROTEIN: 7.2 G/DL (ref 6.1–8)
TROPONIN T: < 0.01 NG/ML
TROPONIN T: < 0.01 NG/ML
WBC # BLD: 6.9 THOU/MM3 (ref 4.8–10.8)

## 2018-04-23 PROCEDURE — 85025 COMPLETE CBC W/AUTO DIFF WBC: CPT

## 2018-04-23 PROCEDURE — 93005 ELECTROCARDIOGRAM TRACING: CPT | Performed by: NURSE PRACTITIONER

## 2018-04-23 PROCEDURE — 71046 X-RAY EXAM CHEST 2 VIEWS: CPT

## 2018-04-23 PROCEDURE — 96372 THER/PROPH/DIAG INJ SC/IM: CPT

## 2018-04-23 PROCEDURE — 97110 THERAPEUTIC EXERCISES: CPT

## 2018-04-23 PROCEDURE — 6360000002 HC RX W HCPCS: Performed by: EMERGENCY MEDICINE

## 2018-04-23 PROCEDURE — 6370000000 HC RX 637 (ALT 250 FOR IP): Performed by: EMERGENCY MEDICINE

## 2018-04-23 PROCEDURE — 93010 ELECTROCARDIOGRAM REPORT: CPT | Performed by: INTERNAL MEDICINE

## 2018-04-23 PROCEDURE — 72040 X-RAY EXAM NECK SPINE 2-3 VW: CPT

## 2018-04-23 PROCEDURE — 83880 ASSAY OF NATRIURETIC PEPTIDE: CPT

## 2018-04-23 PROCEDURE — 36415 COLL VENOUS BLD VENIPUNCTURE: CPT

## 2018-04-23 PROCEDURE — 99285 EMERGENCY DEPT VISIT HI MDM: CPT

## 2018-04-23 PROCEDURE — 80053 COMPREHEN METABOLIC PANEL: CPT

## 2018-04-23 PROCEDURE — 84484 ASSAY OF TROPONIN QUANT: CPT

## 2018-04-23 RX ORDER — HYDROCODONE BITARTRATE AND ACETAMINOPHEN 5; 325 MG/1; MG/1
1 TABLET ORAL EVERY 6 HOURS PRN
Qty: 12 TABLET | Refills: 0 | Status: SHIPPED | OUTPATIENT
Start: 2018-04-23 | End: 2018-04-26 | Stop reason: ALTCHOICE

## 2018-04-23 RX ORDER — CYCLOBENZAPRINE HCL 10 MG
10 TABLET ORAL ONCE
Status: COMPLETED | OUTPATIENT
Start: 2018-04-23 | End: 2018-04-23

## 2018-04-23 RX ORDER — KETOROLAC TROMETHAMINE 30 MG/ML
30 INJECTION, SOLUTION INTRAMUSCULAR; INTRAVENOUS ONCE
Status: COMPLETED | OUTPATIENT
Start: 2018-04-23 | End: 2018-04-23

## 2018-04-23 RX ORDER — NAPROXEN 500 MG/1
500 TABLET ORAL 2 TIMES DAILY WITH MEALS
Qty: 20 TABLET | Refills: 0 | Status: SHIPPED | OUTPATIENT
Start: 2018-04-23 | End: 2018-06-23 | Stop reason: ALTCHOICE

## 2018-04-23 RX ORDER — HYDROCODONE BITARTRATE AND ACETAMINOPHEN 5; 325 MG/1; MG/1
1 TABLET ORAL ONCE
Status: COMPLETED | OUTPATIENT
Start: 2018-04-23 | End: 2018-04-23

## 2018-04-23 RX ADMIN — CYCLOBENZAPRINE HYDROCHLORIDE 10 MG: 10 TABLET, FILM COATED ORAL at 16:41

## 2018-04-23 RX ADMIN — KETOROLAC TROMETHAMINE 30 MG: 30 INJECTION, SOLUTION INTRAMUSCULAR at 16:41

## 2018-04-23 RX ADMIN — HYDROCODONE BITARTRATE AND ACETAMINOPHEN 1 TABLET: 5; 325 TABLET ORAL at 18:21

## 2018-04-23 ASSESSMENT — PAIN DESCRIPTION - LOCATION
LOCATION: BACK;PELVIS
LOCATION: CHEST

## 2018-04-23 ASSESSMENT — PAIN SCALES - GENERAL
PAINLEVEL_OUTOF10: 8
PAINLEVEL_OUTOF10: 5
PAINLEVEL_OUTOF10: 7
PAINLEVEL_OUTOF10: 7
PAINLEVEL_OUTOF10: 8
PAINLEVEL_OUTOF10: 8

## 2018-04-23 ASSESSMENT — PAIN DESCRIPTION - DESCRIPTORS: DESCRIPTORS: ACHING

## 2018-04-23 ASSESSMENT — HEART SCORE: ECG: 0

## 2018-04-23 ASSESSMENT — ENCOUNTER SYMPTOMS
NAUSEA: 0
WHEEZING: 0
SORE THROAT: 0
BACK PAIN: 1
EYE PAIN: 0
COUGH: 0
ABDOMINAL PAIN: 0
DIARRHEA: 0
SHORTNESS OF BREATH: 0
VOMITING: 0
RHINORRHEA: 0
EYE DISCHARGE: 0

## 2018-04-23 ASSESSMENT — PAIN DESCRIPTION - PAIN TYPE
TYPE: ACUTE PAIN
TYPE: CHRONIC PAIN

## 2018-04-23 ASSESSMENT — PAIN DESCRIPTION - FREQUENCY: FREQUENCY: CONTINUOUS

## 2018-04-23 ASSESSMENT — PAIN DESCRIPTION - ORIENTATION: ORIENTATION: RIGHT;LEFT

## 2018-04-26 ENCOUNTER — TELEPHONE (OUTPATIENT)
Dept: CARDIOLOGY CLINIC | Age: 63
End: 2018-04-26

## 2018-04-26 ENCOUNTER — APPOINTMENT (OUTPATIENT)
Dept: PHYSICAL THERAPY | Age: 63
End: 2018-04-26
Payer: COMMERCIAL

## 2018-04-26 ENCOUNTER — OFFICE VISIT (OUTPATIENT)
Dept: ENT CLINIC | Age: 63
End: 2018-04-26

## 2018-04-26 VITALS
RESPIRATION RATE: 18 BRPM | SYSTOLIC BLOOD PRESSURE: 128 MMHG | DIASTOLIC BLOOD PRESSURE: 80 MMHG | BODY MASS INDEX: 30.39 KG/M2 | HEIGHT: 64 IN | TEMPERATURE: 97.7 F | HEART RATE: 80 BPM | WEIGHT: 178 LBS

## 2018-04-26 DIAGNOSIS — R51.9 RHINOGENIC HEADACHE: ICD-10-CM

## 2018-04-26 DIAGNOSIS — J34.3 HYPERTROPHY OF INFERIOR NASAL TURBINATE: ICD-10-CM

## 2018-04-26 DIAGNOSIS — J34.2 DEVIATED NASAL SEPTUM: Primary | ICD-10-CM

## 2018-04-26 PROCEDURE — PREOPEXAM PRE-OP EXAM: Performed by: NURSE PRACTITIONER

## 2018-04-26 ASSESSMENT — ENCOUNTER SYMPTOMS
NAUSEA: 0
EYE DISCHARGE: 0
EYE PAIN: 0
PHOTOPHOBIA: 0
FACIAL SWELLING: 0
BLOOD IN STOOL: 0
DIARRHEA: 0
WHEEZING: 0
RECTAL PAIN: 0
VOICE CHANGE: 0
CONSTIPATION: 0
ABDOMINAL DISTENTION: 0
ANAL BLEEDING: 0
APNEA: 0
BACK PAIN: 0
CHOKING: 0
CHEST TIGHTNESS: 0
EYE ITCHING: 0
SHORTNESS OF BREATH: 0
VOMITING: 0
RHINORRHEA: 0
EYE REDNESS: 0
COLOR CHANGE: 0
COUGH: 0
SORE THROAT: 0
ABDOMINAL PAIN: 0
STRIDOR: 0
SINUS PRESSURE: 0
TROUBLE SWALLOWING: 0

## 2018-04-30 ENCOUNTER — TELEPHONE (OUTPATIENT)
Dept: ENT CLINIC | Age: 63
End: 2018-04-30

## 2018-04-30 ENCOUNTER — HOSPITAL ENCOUNTER (OUTPATIENT)
Dept: PHYSICAL THERAPY | Age: 63
Setting detail: THERAPIES SERIES
Discharge: HOME | End: 2018-04-30
Payer: COMMERCIAL

## 2018-05-02 ENCOUNTER — TELEPHONE (OUTPATIENT)
Dept: CARDIOLOGY CLINIC | Age: 63
End: 2018-05-02

## 2018-05-05 ENCOUNTER — HOSPITAL ENCOUNTER (EMERGENCY)
Age: 63
Discharge: HOME | End: 2018-05-05
Attending: FAMILY MEDICINE
Payer: COMMERCIAL

## 2018-05-05 ENCOUNTER — APPOINTMENT (OUTPATIENT)
Dept: GENERAL RADIOLOGY | Age: 63
End: 2018-05-05
Payer: COMMERCIAL

## 2018-05-05 VITALS
SYSTOLIC BLOOD PRESSURE: 143 MMHG | HEART RATE: 72 BPM | BODY MASS INDEX: 29.88 KG/M2 | DIASTOLIC BLOOD PRESSURE: 90 MMHG | RESPIRATION RATE: 18 BRPM | HEIGHT: 64 IN | WEIGHT: 175 LBS | TEMPERATURE: 97.9 F | OXYGEN SATURATION: 97 %

## 2018-05-05 DIAGNOSIS — M25.511 ACUTE PAIN OF RIGHT SHOULDER: ICD-10-CM

## 2018-05-05 DIAGNOSIS — S43.491A SPRAIN OF OTHER PART OF RIGHT SHOULDER REGION, INITIAL ENCOUNTER: Primary | ICD-10-CM

## 2018-05-05 DIAGNOSIS — S49.91XA INJURY OF RIGHT SHOULDER, INITIAL ENCOUNTER: ICD-10-CM

## 2018-05-05 LAB
ANION GAP SERPL CALCULATED.3IONS-SCNC: 15 MEQ/L (ref 8–16)
BASOPHILS # BLD: 0.5 %
BASOPHILS ABSOLUTE: 0 THOU/MM3 (ref 0–0.1)
BUN BLDV-MCNC: 7 MG/DL (ref 7–22)
CALCIUM SERPL-MCNC: 9.9 MG/DL (ref 8.5–10.5)
CHLORIDE BLD-SCNC: 102 MEQ/L (ref 98–111)
CO2: 20 MEQ/L (ref 23–33)
CREAT SERPL-MCNC: 0.6 MG/DL (ref 0.4–1.2)
EOSINOPHIL # BLD: 0.7 %
EOSINOPHILS ABSOLUTE: 0 THOU/MM3 (ref 0–0.4)
GFR SERPL CREATININE-BSD FRML MDRD: > 90 ML/MIN/1.73M2
GLUCOSE BLD-MCNC: 159 MG/DL (ref 70–108)
HCT VFR BLD CALC: 40.2 % (ref 37–47)
HEMOGLOBIN: 13.5 GM/DL (ref 12–16)
LYMPHOCYTES # BLD: 37.9 %
LYMPHOCYTES ABSOLUTE: 2.4 THOU/MM3 (ref 1–4.8)
MCH RBC QN AUTO: 28.5 PG (ref 27–31)
MCHC RBC AUTO-ENTMCNC: 33.6 GM/DL (ref 33–37)
MCV RBC AUTO: 84.8 FL (ref 81–99)
MONOCYTES # BLD: 4 %
MONOCYTES ABSOLUTE: 0.3 THOU/MM3 (ref 0.4–1.3)
NUCLEATED RED BLOOD CELLS: 0 /100 WBC
OSMOLALITY CALCULATION: 275.2 MOSMOL/KG (ref 275–300)
PDW BLD-RTO: 13.8 % (ref 11.5–14.5)
PLATELET # BLD: 283 THOU/MM3 (ref 130–400)
PMV BLD AUTO: 7.7 FL (ref 7.4–10.4)
POTASSIUM SERPL-SCNC: 3.9 MEQ/L (ref 3.5–5.2)
RBC # BLD: 4.74 MILL/MM3 (ref 4.2–5.4)
SEG NEUTROPHILS: 56.9 %
SEGMENTED NEUTROPHILS ABSOLUTE COUNT: 3.6 THOU/MM3 (ref 1.8–7.7)
SODIUM BLD-SCNC: 137 MEQ/L (ref 135–145)
TROPONIN T: < 0.01 NG/ML
WBC # BLD: 6.4 THOU/MM3 (ref 4.8–10.8)

## 2018-05-05 PROCEDURE — 36415 COLL VENOUS BLD VENIPUNCTURE: CPT

## 2018-05-05 PROCEDURE — 85025 COMPLETE CBC W/AUTO DIFF WBC: CPT

## 2018-05-05 PROCEDURE — 84484 ASSAY OF TROPONIN QUANT: CPT

## 2018-05-05 PROCEDURE — 80048 BASIC METABOLIC PNL TOTAL CA: CPT

## 2018-05-05 PROCEDURE — 99283 EMERGENCY DEPT VISIT LOW MDM: CPT

## 2018-05-05 PROCEDURE — 96372 THER/PROPH/DIAG INJ SC/IM: CPT

## 2018-05-05 PROCEDURE — 6360000002 HC RX W HCPCS: Performed by: PHYSICIAN ASSISTANT

## 2018-05-05 PROCEDURE — 73030 X-RAY EXAM OF SHOULDER: CPT

## 2018-05-05 RX ORDER — ACETAMINOPHEN AND CODEINE PHOSPHATE 300; 30 MG/1; MG/1
1 TABLET ORAL EVERY 4 HOURS PRN
Qty: 20 TABLET | Refills: 0 | Status: SHIPPED | OUTPATIENT
Start: 2018-05-05 | End: 2018-05-10

## 2018-05-05 RX ORDER — ORPHENADRINE CITRATE 30 MG/ML
60 INJECTION INTRAMUSCULAR; INTRAVENOUS ONCE
Status: COMPLETED | OUTPATIENT
Start: 2018-05-05 | End: 2018-05-05

## 2018-05-05 RX ADMIN — ORPHENADRINE CITRATE 60 MG: 30 INJECTION INTRAMUSCULAR; INTRAVENOUS at 13:40

## 2018-05-05 ASSESSMENT — ENCOUNTER SYMPTOMS
SHORTNESS OF BREATH: 0
ABDOMINAL PAIN: 0
BACK PAIN: 0
RHINORRHEA: 0
WHEEZING: 0
BACK PAIN: 1
SORE THROAT: 0
DIARRHEA: 0
EYE PAIN: 0
COUGH: 0
VOMITING: 0
EYE DISCHARGE: 0
NAUSEA: 0

## 2018-05-05 ASSESSMENT — PAIN DESCRIPTION - PROGRESSION: CLINICAL_PROGRESSION: GRADUALLY WORSENING

## 2018-05-05 ASSESSMENT — PAIN DESCRIPTION - DESCRIPTORS: DESCRIPTORS: CONSTANT

## 2018-05-05 ASSESSMENT — PAIN SCALES - GENERAL: PAINLEVEL_OUTOF10: 10

## 2018-05-05 ASSESSMENT — PAIN DESCRIPTION - ORIENTATION: ORIENTATION: RIGHT

## 2018-05-05 ASSESSMENT — PAIN DESCRIPTION - LOCATION: LOCATION: ARM;SHOULDER;NECK

## 2018-05-05 ASSESSMENT — PAIN DESCRIPTION - FREQUENCY: FREQUENCY: CONTINUOUS

## 2018-05-05 ASSESSMENT — PAIN DESCRIPTION - PAIN TYPE: TYPE: ACUTE PAIN

## 2018-05-08 PROBLEM — J34.2 NASAL SEPTAL DEVIATION: Status: ACTIVE | Noted: 2018-05-08

## 2018-05-08 PROBLEM — H90.3 ASYMMETRICAL SENSORINEURAL HEARING LOSS: Status: ACTIVE | Noted: 2018-05-08

## 2018-05-08 PROBLEM — J34.3 HYPERTROPHY OF INFERIOR NASAL TURBINATE: Status: ACTIVE | Noted: 2018-05-08

## 2018-05-08 PROBLEM — R51.9 RHINOGENIC HEADACHE: Status: ACTIVE | Noted: 2018-05-08

## 2018-05-08 PROBLEM — H93.12 TINNITUS, LEFT EAR: Status: ACTIVE | Noted: 2018-05-08

## 2018-05-08 RX ORDER — HYDROCODONE BITARTRATE AND ACETAMINOPHEN 7.5; 325 MG/1; MG/1
1 TABLET ORAL EVERY 6 HOURS PRN
Qty: 20 TABLET | Refills: 0 | OUTPATIENT
Start: 2018-05-08 | End: 2018-05-13

## 2018-05-08 RX ORDER — PSEUDOEPHEDRINE HYDROCHLORIDE 30 MG/1
30 TABLET ORAL EVERY 6 HOURS
Qty: 56 TABLET | Refills: 1 | OUTPATIENT
Start: 2018-05-08 | End: 2018-05-22

## 2018-05-08 RX ORDER — PREDNISONE 20 MG/1
20 TABLET ORAL DAILY
Qty: 4 TABLET | Refills: 0 | OUTPATIENT
Start: 2018-05-08 | End: 2018-05-11

## 2018-05-09 ENCOUNTER — HOSPITAL ENCOUNTER (OUTPATIENT)
Age: 63
Setting detail: OUTPATIENT SURGERY
Discharge: HOME | End: 2018-05-09
Attending: OTOLARYNGOLOGY | Admitting: OTOLARYNGOLOGY
Payer: COMMERCIAL

## 2018-05-09 VITALS
DIASTOLIC BLOOD PRESSURE: 88 MMHG | SYSTOLIC BLOOD PRESSURE: 186 MMHG | RESPIRATION RATE: 18 BRPM | TEMPERATURE: 97.5 F | OXYGEN SATURATION: 95 % | HEART RATE: 83 BPM

## 2018-05-09 DIAGNOSIS — J34.3 HYPERTROPHY OF INFERIOR NASAL TURBINATE: ICD-10-CM

## 2018-05-09 DIAGNOSIS — J34.2 NASAL SEPTAL DEVIATION: Primary | ICD-10-CM

## 2018-05-09 DIAGNOSIS — R51.9 RHINOGENIC HEADACHE: ICD-10-CM

## 2018-05-09 LAB
AMPHETAMINE+METHAMPHETAMINE URINE SCREEN: NEGATIVE
BARBITURATE QUANTITATIVE URINE: NEGATIVE
BENZODIAZEPINE QUANTITATIVE URINE: NEGATIVE
CANNABINOID QUANTITATIVE URINE: NEGATIVE
COCAINE METABOLITE QUANTITATIVE URINE: NORMAL
OPIATES, URINE: NEGATIVE
OXYCODONE: NEGATIVE
PHENCYCLIDINE QUANTITATIVE URINE: NEGATIVE

## 2018-05-09 PROCEDURE — 80305 DRUG TEST PRSMV DIR OPT OBS: CPT

## 2018-05-09 RX ORDER — DEXAMETHASONE SODIUM PHOSPHATE 4 MG/ML
8 INJECTION, SOLUTION INTRA-ARTICULAR; INTRALESIONAL; INTRAMUSCULAR; INTRAVENOUS; SOFT TISSUE
Status: DISCONTINUED | OUTPATIENT
Start: 2018-05-09 | End: 2018-05-09 | Stop reason: HOSPADM

## 2018-05-09 ASSESSMENT — PAIN SCALES - GENERAL: PAINLEVEL_OUTOF10: 5

## 2018-05-10 ENCOUNTER — TELEPHONE (OUTPATIENT)
Dept: ENT CLINIC | Age: 63
End: 2018-05-10

## 2018-05-18 ENCOUNTER — HOSPITAL ENCOUNTER (OUTPATIENT)
Dept: PHYSICAL THERAPY | Age: 63
Setting detail: THERAPIES SERIES
Discharge: HOME | End: 2018-05-18
Payer: COMMERCIAL

## 2018-05-28 ENCOUNTER — APPOINTMENT (OUTPATIENT)
Dept: CT IMAGING | Age: 63
End: 2018-05-28
Payer: COMMERCIAL

## 2018-05-28 ENCOUNTER — HOSPITAL ENCOUNTER (EMERGENCY)
Age: 63
Discharge: HOME | End: 2018-05-28
Payer: COMMERCIAL

## 2018-05-28 VITALS
DIASTOLIC BLOOD PRESSURE: 99 MMHG | BODY MASS INDEX: 29.88 KG/M2 | RESPIRATION RATE: 16 BRPM | HEART RATE: 84 BPM | SYSTOLIC BLOOD PRESSURE: 151 MMHG | HEIGHT: 64 IN | WEIGHT: 175 LBS | TEMPERATURE: 98.1 F | OXYGEN SATURATION: 97 %

## 2018-05-28 DIAGNOSIS — G89.29 CHRONIC RIGHT SHOULDER PAIN: Primary | ICD-10-CM

## 2018-05-28 DIAGNOSIS — M25.511 CHRONIC RIGHT SHOULDER PAIN: Primary | ICD-10-CM

## 2018-05-28 LAB
ANION GAP SERPL CALCULATED.3IONS-SCNC: 13 MEQ/L (ref 8–16)
ANISOCYTOSIS: ABNORMAL
BASOPHILS # BLD: 0.8 %
BASOPHILS ABSOLUTE: 0.1 THOU/MM3 (ref 0–0.1)
BUN BLDV-MCNC: 8 MG/DL (ref 7–22)
CALCIUM SERPL-MCNC: 10 MG/DL (ref 8.5–10.5)
CHLORIDE BLD-SCNC: 101 MEQ/L (ref 98–111)
CO2: 24 MEQ/L (ref 23–33)
CREAT SERPL-MCNC: 0.7 MG/DL (ref 0.4–1.2)
EKG ATRIAL RATE: 67 BPM
EKG P AXIS: 62 DEGREES
EKG P-R INTERVAL: 136 MS
EKG Q-T INTERVAL: 424 MS
EKG QRS DURATION: 86 MS
EKG QTC CALCULATION (BAZETT): 448 MS
EKG R AXIS: 5 DEGREES
EKG T AXIS: 55 DEGREES
EKG VENTRICULAR RATE: 67 BPM
EOSINOPHIL # BLD: 0.7 %
EOSINOPHILS ABSOLUTE: 0 THOU/MM3 (ref 0–0.4)
GFR SERPL CREATININE-BSD FRML MDRD: 85 ML/MIN/1.73M2
GLUCOSE BLD-MCNC: 91 MG/DL (ref 70–108)
HCT VFR BLD CALC: 43.6 % (ref 37–47)
HEMOGLOBIN: 14.7 GM/DL (ref 12–16)
LYMPHOCYTES # BLD: 53.1 %
LYMPHOCYTES ABSOLUTE: 3.5 THOU/MM3 (ref 1–4.8)
MCH RBC QN AUTO: 28.2 PG (ref 27–31)
MCHC RBC AUTO-ENTMCNC: 33.8 GM/DL (ref 33–37)
MCV RBC AUTO: 83.4 FL (ref 81–99)
MONOCYTES # BLD: 5.8 %
MONOCYTES ABSOLUTE: 0.4 THOU/MM3 (ref 0.4–1.3)
NUCLEATED RED BLOOD CELLS: 0 /100 WBC
OSMOLALITY CALCULATION: 273.6 MOSMOL/KG (ref 275–300)
PDW BLD-RTO: 14.7 % (ref 11.5–14.5)
PLATELET # BLD: 286 THOU/MM3 (ref 130–400)
PMV BLD AUTO: 8.6 FL (ref 7.4–10.4)
POTASSIUM SERPL-SCNC: 4 MEQ/L (ref 3.5–5.2)
RBC # BLD: 5.23 MILL/MM3 (ref 4.2–5.4)
SEG NEUTROPHILS: 39.6 %
SEGMENTED NEUTROPHILS ABSOLUTE COUNT: 2.6 THOU/MM3 (ref 1.8–7.7)
SODIUM BLD-SCNC: 138 MEQ/L (ref 135–145)
TROPONIN T: < 0.01 NG/ML
WBC # BLD: 6.6 THOU/MM3 (ref 4.8–10.8)

## 2018-05-28 PROCEDURE — 71275 CT ANGIOGRAPHY CHEST: CPT

## 2018-05-28 PROCEDURE — 36415 COLL VENOUS BLD VENIPUNCTURE: CPT

## 2018-05-28 PROCEDURE — 99283 EMERGENCY DEPT VISIT LOW MDM: CPT

## 2018-05-28 PROCEDURE — 6360000002 HC RX W HCPCS: Performed by: NURSE PRACTITIONER

## 2018-05-28 PROCEDURE — 96372 THER/PROPH/DIAG INJ SC/IM: CPT

## 2018-05-28 PROCEDURE — 85025 COMPLETE CBC W/AUTO DIFF WBC: CPT

## 2018-05-28 PROCEDURE — 6360000004 HC RX CONTRAST MEDICATION: Performed by: NURSE PRACTITIONER

## 2018-05-28 PROCEDURE — 93005 ELECTROCARDIOGRAM TRACING: CPT | Performed by: NURSE PRACTITIONER

## 2018-05-28 PROCEDURE — 84484 ASSAY OF TROPONIN QUANT: CPT

## 2018-05-28 PROCEDURE — 6370000000 HC RX 637 (ALT 250 FOR IP): Performed by: NURSE PRACTITIONER

## 2018-05-28 PROCEDURE — 80048 BASIC METABOLIC PNL TOTAL CA: CPT

## 2018-05-28 RX ORDER — KETOROLAC TROMETHAMINE 30 MG/ML
30 INJECTION, SOLUTION INTRAMUSCULAR; INTRAVENOUS ONCE
Status: COMPLETED | OUTPATIENT
Start: 2018-05-28 | End: 2018-05-28

## 2018-05-28 RX ORDER — LIDOCAINE 50 MG/G
1 PATCH TOPICAL DAILY
Qty: 30 PATCH | Refills: 0 | Status: SHIPPED | OUTPATIENT
Start: 2018-05-28 | End: 2018-07-24 | Stop reason: ALTCHOICE

## 2018-05-28 RX ORDER — ORPHENADRINE CITRATE 30 MG/ML
60 INJECTION INTRAMUSCULAR; INTRAVENOUS ONCE
Status: COMPLETED | OUTPATIENT
Start: 2018-05-28 | End: 2018-05-28

## 2018-05-28 RX ORDER — ORPHENADRINE CITRATE 100 MG/1
100 TABLET, EXTENDED RELEASE ORAL 2 TIMES DAILY
Qty: 20 TABLET | Refills: 0 | Status: SHIPPED | OUTPATIENT
Start: 2018-05-28 | End: 2018-06-07

## 2018-05-28 RX ORDER — LIDOCAINE 50 MG/G
1 PATCH TOPICAL DAILY
Status: DISCONTINUED | OUTPATIENT
Start: 2018-05-28 | End: 2018-05-29 | Stop reason: HOSPADM

## 2018-05-28 RX ORDER — METHYLPREDNISOLONE SODIUM SUCCINATE 125 MG/2ML
80 INJECTION, POWDER, LYOPHILIZED, FOR SOLUTION INTRAMUSCULAR; INTRAVENOUS ONCE
Status: COMPLETED | OUTPATIENT
Start: 2018-05-28 | End: 2018-05-28

## 2018-05-28 RX ADMIN — METHYLPREDNISOLONE SODIUM SUCCINATE 80 MG: 125 INJECTION, POWDER, FOR SOLUTION INTRAMUSCULAR; INTRAVENOUS at 20:05

## 2018-05-28 RX ADMIN — KETOROLAC TROMETHAMINE 30 MG: 30 INJECTION, SOLUTION INTRAMUSCULAR at 18:31

## 2018-05-28 RX ADMIN — IOPAMIDOL 80 ML: 755 INJECTION, SOLUTION INTRAVENOUS at 21:13

## 2018-05-28 RX ADMIN — ORPHENADRINE CITRATE 60 MG: 30 INJECTION INTRAMUSCULAR; INTRAVENOUS at 18:31

## 2018-05-28 ASSESSMENT — PAIN DESCRIPTION - LOCATION
LOCATION: SHOULDER
LOCATION: SHOULDER

## 2018-05-28 ASSESSMENT — PAIN DESCRIPTION - PAIN TYPE
TYPE: ACUTE PAIN
TYPE: ACUTE PAIN

## 2018-05-28 ASSESSMENT — PAIN DESCRIPTION - ONSET: ONSET: ON-GOING

## 2018-05-28 ASSESSMENT — PAIN DESCRIPTION - DESCRIPTORS
DESCRIPTORS: SHARP
DESCRIPTORS: ACHING;SHARP

## 2018-05-28 ASSESSMENT — PAIN DESCRIPTION - FREQUENCY
FREQUENCY: CONTINUOUS
FREQUENCY: CONTINUOUS

## 2018-05-28 ASSESSMENT — ENCOUNTER SYMPTOMS
WHEEZING: 0
BACK PAIN: 0
EYE DISCHARGE: 0
RHINORRHEA: 0
DIARRHEA: 0
SORE THROAT: 0
VOMITING: 0
SHORTNESS OF BREATH: 0
ABDOMINAL PAIN: 0
COUGH: 0
NAUSEA: 0
EYE PAIN: 0

## 2018-05-28 ASSESSMENT — PAIN DESCRIPTION - PROGRESSION: CLINICAL_PROGRESSION: NOT CHANGED

## 2018-05-28 ASSESSMENT — PAIN SCALES - GENERAL
PAINLEVEL_OUTOF10: 8
PAINLEVEL_OUTOF10: 10

## 2018-05-28 ASSESSMENT — PAIN DESCRIPTION - ORIENTATION
ORIENTATION: RIGHT
ORIENTATION: RIGHT

## 2018-06-04 ENCOUNTER — HOSPITAL ENCOUNTER (OUTPATIENT)
Dept: GENERAL RADIOLOGY | Age: 63
Discharge: HOME | End: 2018-06-04
Payer: COMMERCIAL

## 2018-06-04 ENCOUNTER — HOSPITAL ENCOUNTER (OUTPATIENT)
Age: 63
Discharge: HOME | End: 2018-06-04
Payer: COMMERCIAL

## 2018-06-04 DIAGNOSIS — M25.511 RIGHT SHOULDER PAIN, UNSPECIFIED CHRONICITY: ICD-10-CM

## 2018-06-04 PROCEDURE — 73030 X-RAY EXAM OF SHOULDER: CPT

## 2018-06-12 ENCOUNTER — TELEPHONE (OUTPATIENT)
Dept: ENT CLINIC | Age: 63
End: 2018-06-12

## 2018-06-22 ENCOUNTER — HOSPITAL ENCOUNTER (EMERGENCY)
Age: 63
Discharge: HOME | End: 2018-06-23
Payer: COMMERCIAL

## 2018-06-22 VITALS
SYSTOLIC BLOOD PRESSURE: 151 MMHG | HEART RATE: 66 BPM | TEMPERATURE: 98.1 F | WEIGHT: 167 LBS | OXYGEN SATURATION: 96 % | BODY MASS INDEX: 28.67 KG/M2 | RESPIRATION RATE: 18 BRPM | DIASTOLIC BLOOD PRESSURE: 93 MMHG

## 2018-06-22 DIAGNOSIS — G89.29 CHRONIC RIGHT SHOULDER PAIN: Primary | ICD-10-CM

## 2018-06-22 DIAGNOSIS — M25.511 CHRONIC RIGHT SHOULDER PAIN: Primary | ICD-10-CM

## 2018-06-22 DIAGNOSIS — T39.1X1A TYLENOL OVERDOSE, ACCIDENTAL OR UNINTENTIONAL, INITIAL ENCOUNTER: ICD-10-CM

## 2018-06-22 LAB
ANISOCYTOSIS: ABNORMAL
BASOPHILS # BLD: 1 %
BASOPHILS ABSOLUTE: 0.1 THOU/MM3 (ref 0–0.1)
EOSINOPHIL # BLD: 0.5 %
EOSINOPHILS ABSOLUTE: 0.1 THOU/MM3 (ref 0–0.4)
HCT VFR BLD CALC: 41.9 % (ref 37–47)
HEMOGLOBIN: 14 GM/DL (ref 12–16)
LYMPHOCYTES # BLD: 50.4 %
LYMPHOCYTES ABSOLUTE: 5.5 THOU/MM3 (ref 1–4.8)
MCH RBC QN AUTO: 27.7 PG (ref 27–31)
MCHC RBC AUTO-ENTMCNC: 33.5 GM/DL (ref 33–37)
MCV RBC AUTO: 82.8 FL (ref 81–99)
MONOCYTES # BLD: 5.8 %
MONOCYTES ABSOLUTE: 0.6 THOU/MM3 (ref 0.4–1.3)
NUCLEATED RED BLOOD CELLS: 0 /100 WBC
PDW BLD-RTO: 14.6 % (ref 11.5–14.5)
PLATELET # BLD: 321 THOU/MM3 (ref 130–400)
PMV BLD AUTO: 8.3 FL (ref 7.4–10.4)
RBC # BLD: 5.06 MILL/MM3 (ref 4.2–5.4)
SEG NEUTROPHILS: 42.3 %
SEGMENTED NEUTROPHILS ABSOLUTE COUNT: 4.7 THOU/MM3 (ref 1.8–7.7)
WBC # BLD: 11 THOU/MM3 (ref 4.8–10.8)

## 2018-06-22 PROCEDURE — 96372 THER/PROPH/DIAG INJ SC/IM: CPT

## 2018-06-22 PROCEDURE — 99283 EMERGENCY DEPT VISIT LOW MDM: CPT

## 2018-06-22 PROCEDURE — 80048 BASIC METABOLIC PNL TOTAL CA: CPT

## 2018-06-22 PROCEDURE — G0480 DRUG TEST DEF 1-7 CLASSES: HCPCS

## 2018-06-22 PROCEDURE — 36415 COLL VENOUS BLD VENIPUNCTURE: CPT

## 2018-06-22 PROCEDURE — 85025 COMPLETE CBC W/AUTO DIFF WBC: CPT

## 2018-06-22 PROCEDURE — 6360000002 HC RX W HCPCS: Performed by: NURSE PRACTITIONER

## 2018-06-22 RX ORDER — ORPHENADRINE CITRATE 30 MG/ML
60 INJECTION INTRAMUSCULAR; INTRAVENOUS ONCE
Status: COMPLETED | OUTPATIENT
Start: 2018-06-23 | End: 2018-06-22

## 2018-06-22 RX ORDER — KETOROLAC TROMETHAMINE 30 MG/ML
30 INJECTION, SOLUTION INTRAMUSCULAR; INTRAVENOUS ONCE
Status: COMPLETED | OUTPATIENT
Start: 2018-06-23 | End: 2018-06-22

## 2018-06-22 RX ADMIN — ORPHENADRINE CITRATE 60 MG: 30 INJECTION INTRAMUSCULAR; INTRAVENOUS at 23:40

## 2018-06-22 RX ADMIN — KETOROLAC TROMETHAMINE 30 MG: 30 INJECTION, SOLUTION INTRAMUSCULAR at 23:40

## 2018-06-22 ASSESSMENT — PAIN SCALES - GENERAL
PAINLEVEL_OUTOF10: 10

## 2018-06-22 ASSESSMENT — PAIN DESCRIPTION - LOCATION
LOCATION: SHOULDER;BACK
LOCATION: SHOULDER

## 2018-06-22 ASSESSMENT — PAIN DESCRIPTION - PAIN TYPE: TYPE: ACUTE PAIN

## 2018-06-22 ASSESSMENT — PAIN DESCRIPTION - ORIENTATION
ORIENTATION: RIGHT
ORIENTATION: RIGHT

## 2018-06-23 LAB
ACETAMINOPHEN LEVEL: 10.7 UG/ML (ref 0–20)
ACETAMINOPHEN LEVEL: 24.3 UG/ML (ref 0–20)
ALBUMIN SERPL-MCNC: 3.6 G/DL (ref 3.5–5.1)
ALP BLD-CCNC: 82 U/L (ref 38–126)
ALT SERPL-CCNC: 21 U/L (ref 11–66)
ANION GAP SERPL CALCULATED.3IONS-SCNC: 16 MEQ/L (ref 8–16)
AST SERPL-CCNC: 23 U/L (ref 5–40)
BILIRUB SERPL-MCNC: 0.2 MG/DL (ref 0.3–1.2)
BILIRUBIN DIRECT: < 0.2 MG/DL (ref 0–0.3)
BUN BLDV-MCNC: 12 MG/DL (ref 7–22)
CALCIUM SERPL-MCNC: 10.2 MG/DL (ref 8.5–10.5)
CHLORIDE BLD-SCNC: 103 MEQ/L (ref 98–111)
CO2: 20 MEQ/L (ref 23–33)
CREAT SERPL-MCNC: 0.7 MG/DL (ref 0.4–1.2)
GFR SERPL CREATININE-BSD FRML MDRD: 85 ML/MIN/1.73M2
GLUCOSE BLD-MCNC: 100 MG/DL (ref 70–108)
OSMOLALITY CALCULATION: 277.4 MOSMOL/KG (ref 275–300)
POTASSIUM SERPL-SCNC: 3.6 MEQ/L (ref 3.5–5.2)
SODIUM BLD-SCNC: 139 MEQ/L (ref 135–145)
TOTAL PROTEIN: 6.1 G/DL (ref 6.1–8)

## 2018-06-23 PROCEDURE — 6370000000 HC RX 637 (ALT 250 FOR IP): Performed by: NURSE PRACTITIONER

## 2018-06-23 PROCEDURE — 80076 HEPATIC FUNCTION PANEL: CPT

## 2018-06-23 PROCEDURE — G0480 DRUG TEST DEF 1-7 CLASSES: HCPCS

## 2018-06-23 PROCEDURE — 36415 COLL VENOUS BLD VENIPUNCTURE: CPT

## 2018-06-23 RX ORDER — HYDROCODONE BITARTRATE AND ACETAMINOPHEN 5; 325 MG/1; MG/1
1 TABLET ORAL ONCE
Status: COMPLETED | OUTPATIENT
Start: 2018-06-23 | End: 2018-06-23

## 2018-06-23 RX ADMIN — HYDROCODONE BITARTRATE AND ACETAMINOPHEN 1 TABLET: 5; 325 TABLET ORAL at 00:30

## 2018-06-23 ASSESSMENT — PAIN SCALES - GENERAL: PAINLEVEL_OUTOF10: 10

## 2018-06-23 ASSESSMENT — ENCOUNTER SYMPTOMS
SORE THROAT: 0
EYE DISCHARGE: 0
SHORTNESS OF BREATH: 0
ABDOMINAL PAIN: 0
EYE PAIN: 0
WHEEZING: 0
NAUSEA: 0
DIARRHEA: 0
VOMITING: 0
BACK PAIN: 0
RHINORRHEA: 0
COUGH: 0

## 2018-07-04 ENCOUNTER — HOSPITAL ENCOUNTER (EMERGENCY)
Age: 63
Discharge: HOME | End: 2018-07-05
Attending: EMERGENCY MEDICINE
Payer: COMMERCIAL

## 2018-07-04 DIAGNOSIS — R19.7 DIARRHEA, UNSPECIFIED TYPE: ICD-10-CM

## 2018-07-04 DIAGNOSIS — R07.89 CHEST WALL PAIN: Primary | ICD-10-CM

## 2018-07-04 LAB
EKG ATRIAL RATE: 78 BPM
EKG P AXIS: 43 DEGREES
EKG P-R INTERVAL: 138 MS
EKG Q-T INTERVAL: 420 MS
EKG QRS DURATION: 84 MS
EKG QTC CALCULATION (BAZETT): 478 MS
EKG R AXIS: -2 DEGREES
EKG T AXIS: 42 DEGREES
EKG VENTRICULAR RATE: 78 BPM

## 2018-07-04 PROCEDURE — 93005 ELECTROCARDIOGRAM TRACING: CPT | Performed by: EMERGENCY MEDICINE

## 2018-07-04 PROCEDURE — 99285 EMERGENCY DEPT VISIT HI MDM: CPT

## 2018-07-04 ASSESSMENT — PAIN DESCRIPTION - PAIN TYPE: TYPE: ACUTE PAIN

## 2018-07-04 ASSESSMENT — PAIN DESCRIPTION - LOCATION: LOCATION: HEAD

## 2018-07-04 ASSESSMENT — PAIN SCALES - GENERAL: PAINLEVEL_OUTOF10: 8

## 2018-07-05 ENCOUNTER — APPOINTMENT (OUTPATIENT)
Dept: GENERAL RADIOLOGY | Age: 63
End: 2018-07-05
Payer: COMMERCIAL

## 2018-07-05 VITALS
BODY MASS INDEX: 27.83 KG/M2 | DIASTOLIC BLOOD PRESSURE: 67 MMHG | OXYGEN SATURATION: 96 % | RESPIRATION RATE: 17 BRPM | HEIGHT: 64 IN | TEMPERATURE: 97.6 F | SYSTOLIC BLOOD PRESSURE: 112 MMHG | WEIGHT: 163 LBS | HEART RATE: 65 BPM

## 2018-07-05 LAB
ANION GAP SERPL CALCULATED.3IONS-SCNC: 18 MEQ/L (ref 8–16)
BASOPHILS # BLD: 0.5 %
BASOPHILS ABSOLUTE: 0 THOU/MM3 (ref 0–0.1)
BUN BLDV-MCNC: 10 MG/DL (ref 7–22)
CALCIUM SERPL-MCNC: 10.6 MG/DL (ref 8.5–10.5)
CHLORIDE BLD-SCNC: 105 MEQ/L (ref 98–111)
CO2: 20 MEQ/L (ref 23–33)
CREAT SERPL-MCNC: 0.9 MG/DL (ref 0.4–1.2)
EOSINOPHIL # BLD: 0.3 %
EOSINOPHILS ABSOLUTE: 0 THOU/MM3 (ref 0–0.4)
ERYTHROCYTE [DISTWIDTH] IN BLOOD BY AUTOMATED COUNT: 14.6 % (ref 11.5–14.5)
ERYTHROCYTE [DISTWIDTH] IN BLOOD BY AUTOMATED COUNT: 44.4 FL (ref 35–45)
GFR SERPL CREATININE-BSD FRML MDRD: 64 ML/MIN/1.73M2
GLUCOSE BLD-MCNC: 130 MG/DL (ref 70–108)
HCT VFR BLD CALC: 40.5 % (ref 37–47)
HEMOGLOBIN: 13.3 GM/DL (ref 12–16)
IMMATURE GRANS (ABS): 0.02 THOU/MM3 (ref 0–0.07)
IMMATURE GRANULOCYTES: 0.2 %
LYMPHOCYTES # BLD: 36.6 %
LYMPHOCYTES ABSOLUTE: 3.2 THOU/MM3 (ref 1–4.8)
MAGNESIUM: 1.7 MG/DL (ref 1.6–2.4)
MCH RBC QN AUTO: 27.7 PG (ref 26–33)
MCHC RBC AUTO-ENTMCNC: 32.8 GM/DL (ref 32.2–35.5)
MCV RBC AUTO: 84.2 FL (ref 81–99)
MONOCYTES # BLD: 6.8 %
MONOCYTES ABSOLUTE: 0.6 THOU/MM3 (ref 0.4–1.3)
NUCLEATED RED BLOOD CELLS: 0 /100 WBC
OSMOLALITY CALCULATION: 285.8 MOSMOL/KG (ref 275–300)
PLATELET # BLD: 265 THOU/MM3 (ref 130–400)
PMV BLD AUTO: 9.2 FL (ref 9.4–12.4)
POTASSIUM REFLEX MAGNESIUM: 3.3 MEQ/L (ref 3.5–5.2)
PRO-BNP: 208.1 PG/ML (ref 0–900)
RBC # BLD: 4.81 MILL/MM3 (ref 4.2–5.4)
SEG NEUTROPHILS: 55.6 %
SEGMENTED NEUTROPHILS ABSOLUTE COUNT: 4.9 THOU/MM3 (ref 1.8–7.7)
SODIUM BLD-SCNC: 143 MEQ/L (ref 135–145)
TROPONIN T: < 0.01 NG/ML
WBC # BLD: 8.8 THOU/MM3 (ref 4.8–10.8)

## 2018-07-05 PROCEDURE — 83880 ASSAY OF NATRIURETIC PEPTIDE: CPT

## 2018-07-05 PROCEDURE — 93010 ELECTROCARDIOGRAM REPORT: CPT | Performed by: INTERNAL MEDICINE

## 2018-07-05 PROCEDURE — 83735 ASSAY OF MAGNESIUM: CPT

## 2018-07-05 PROCEDURE — 36415 COLL VENOUS BLD VENIPUNCTURE: CPT

## 2018-07-05 PROCEDURE — 80048 BASIC METABOLIC PNL TOTAL CA: CPT

## 2018-07-05 PROCEDURE — 6360000002 HC RX W HCPCS: Performed by: EMERGENCY MEDICINE

## 2018-07-05 PROCEDURE — 96372 THER/PROPH/DIAG INJ SC/IM: CPT

## 2018-07-05 PROCEDURE — 96374 THER/PROPH/DIAG INJ IV PUSH: CPT

## 2018-07-05 PROCEDURE — 84484 ASSAY OF TROPONIN QUANT: CPT

## 2018-07-05 PROCEDURE — 71045 X-RAY EXAM CHEST 1 VIEW: CPT

## 2018-07-05 PROCEDURE — 85025 COMPLETE CBC W/AUTO DIFF WBC: CPT

## 2018-07-05 RX ORDER — KETOROLAC TROMETHAMINE 30 MG/ML
INJECTION, SOLUTION INTRAMUSCULAR; INTRAVENOUS
Status: DISCONTINUED
Start: 2018-07-05 | End: 2018-07-05 | Stop reason: HOSPADM

## 2018-07-05 RX ORDER — KETOROLAC TROMETHAMINE 30 MG/ML
30 INJECTION, SOLUTION INTRAMUSCULAR; INTRAVENOUS ONCE
Status: COMPLETED | OUTPATIENT
Start: 2018-07-05 | End: 2018-07-05

## 2018-07-05 RX ORDER — DICYCLOMINE HYDROCHLORIDE 10 MG/ML
20 INJECTION INTRAMUSCULAR ONCE
Status: COMPLETED | OUTPATIENT
Start: 2018-07-05 | End: 2018-07-05

## 2018-07-05 RX ORDER — DIPHENOXYLATE HYDROCHLORIDE AND ATROPINE SULFATE 2.5; .025 MG/1; MG/1
1 TABLET ORAL 4 TIMES DAILY PRN
Qty: 30 TABLET | Refills: 0 | Status: SHIPPED | OUTPATIENT
Start: 2018-07-05 | End: 2018-07-12

## 2018-07-05 RX ADMIN — DICYCLOMINE HYDROCHLORIDE 20 MG: 20 INJECTION, SOLUTION INTRAMUSCULAR at 02:34

## 2018-07-05 RX ADMIN — KETOROLAC TROMETHAMINE 30 MG: 30 INJECTION, SOLUTION INTRAMUSCULAR at 01:46

## 2018-07-05 ASSESSMENT — ENCOUNTER SYMPTOMS
WHEEZING: 0
SHORTNESS OF BREATH: 0
NAUSEA: 0
COUGH: 0
ABDOMINAL PAIN: 0
RHINORRHEA: 0
EYE PAIN: 0
VOMITING: 0
EYE DISCHARGE: 0
BACK PAIN: 0
SORE THROAT: 0
DIARRHEA: 1

## 2018-07-05 ASSESSMENT — PAIN SCALES - GENERAL: PAINLEVEL_OUTOF10: 8

## 2018-07-05 NOTE — ED NOTES
Pt updated on POC and medicated per orders. Pt voiced concern about being admitted and then stated \"Do you think they can at least keep me the night? My air conditioning is not working and it would be nice to stay. \"  I then educated pt that it would be up to Dr Darrick Mendoza and the hospitalist whether she met admission criteria and pt stated \"Well I hope they do because I don't want to stay in the heat again. \" Vitals obtained, pt appears in no obvious distress at this time. Pt requesting something to drink which was given to pt.        Mera Blackburn, RN  07/05/18 [id number]

## 2018-07-05 NOTE — ED PROVIDER NOTES
meq/L    Glucose 130 (H) 70 - 108 mg/dL    BUN 10 7 - 22 mg/dL    CREATININE 0.9 0.4 - 1.2 mg/dL    Calcium 10.6 (H) 8.5 - 10.5 mg/dL   Brain Natriuretic Peptide   Result Value Ref Range    Pro-.1 0.0 - 900.0 pg/mL   CBC Auto Differential   Result Value Ref Range    WBC 8.8 4.8 - 10.8 thou/mm3    RBC 4.81 4.20 - 5.40 mill/mm3    Hemoglobin 13.3 12.0 - 16.0 gm/dl    Hematocrit 40.5 37.0 - 47.0 %    MCV 84.2 81.0 - 99.0 fL    MCH 27.7 26.0 - 33.0 pg    MCHC 32.8 32.2 - 35.5 gm/dl    RDW-CV 14.6 (H) 11.5 - 14.5 %    RDW-SD 44.4 35.0 - 45.0 fL    Platelets 262 757 - 903 thou/mm3    MPV 9.2 (L) 9.4 - 12.4 fL    Seg Neutrophils 55.6 %    Lymphocytes 36.6 %    Monocytes 6.8 %    Eosinophils 0.3 %    Basophils 0.5 %    Immature Granulocytes 0.2 %    Segs Absolute 4.9 1.8 - 7.7 thou/mm3    Lymphocytes # 3.2 1.0 - 4.8 thou/mm3    Monocytes # 0.6 0.4 - 1.3 thou/mm3    Eosinophils # 0.0 0.0 - 0.4 thou/mm3    Basophils # 0.0 0.0 - 0.1 thou/mm3    Immature Grans (Abs) 0.02 0.00 - 0.07 thou/mm3    nRBC 0 /100 wbc   Troponin   Result Value Ref Range    Troponin T < 0.010 ng/ml   Anion Gap   Result Value Ref Range    Anion Gap 18.0 (H) 8.0 - 16.0 meq/L   Osmolality   Result Value Ref Range    Osmolality Calc 285.8 275.0 - 300 mOsmol/kg   Glomerular Filtration Rate, Estimated   Result Value Ref Range    Est, Glom Filt Rate 64 (A) ml/min/1.73m2   Magnesium   Result Value Ref Range    Magnesium 1.7 1.6 - 2.4 mg/dL   EKG 12 Lead   Result Value Ref Range    Ventricular Rate 78 BPM    Atrial Rate 78 BPM    P-R Interval 138 ms    QRS Duration 84 ms    Q-T Interval 420 ms    QTc Calculation (Bazett) 478 ms    P Axis 43 degrees    R Axis -2 degrees    T Axis 42 degrees       EMERGENCY DEPARTMENT COURSE:   Vitals:    Vitals:    07/04/18 2334 07/05/18 0148   BP: 101/77 112/67   Pulse: 78 65   Resp: 19 17   Temp: 97.6 °F (36.4 °C)    TempSrc: Oral    SpO2: 96% 96%   Weight: 163 lb (73.9 kg)    Height: 5' 4\" (1.626 m)        1:01 PM    Patient is seen and evaluated in a timely fashion. Medical Decision Making    She has left side chest wall tenderness for at least six hours with normal troponin and stable EKG. No evidence of ACS. She is given Toradol 30 mg IV and pain slightly improves. She also states she has diarrhea and she wants Lomotil. Other labs are reassuring. She is discharged with PCP follow up in 3-5 days. CRITICAL CARE:   None    CONSULTS:  None    PROCEDURES:  None    FINAL IMPRESSION      1. Chest wall pain    2. Diarrhea, unspecified type          DISPOSITION/PLAN   Home    PATIENT REFERRED TO:  BA Nayak - CNP  [address]  [phone]    In 3 days        DISCHARGE MEDICATIONS:  New Prescriptions    DIPHENOXYLATE-ATROPINE (LOMOTIL) 2.5-0.025 MG PER TABLET    Take 1 tablet by mouth 4 times daily as needed for Diarrhea for up to 7 days. .       (Please note that portions of this note were completed with a voice recognition program.  Efforts were made to edit the dictations but occasionally words are mis-transcribed.)    Scribe:  Morelia Mendoza 7/5/18 1:52 AM Scribing for and in the presence of Per Skinner MD.    Signed by: Hector Solomon, 7/5/18 2:35 AM    Provider:  I personally performed the services described in the documentation, reviewed and edited the documentation which was dictated to the scribe in my presence, and it accurately records my words and actions.     Per Skinner MD 07/05/18 2:35 AM     MD Per Marino MD  07/05/18 5839

## 2018-07-05 NOTE — ED NOTES
Pt complaining that her chest pain has now moved and is radiating to the right side of her chest, up into her neck and upper back and that the left sided chest pain that she presented with originally is now in her left elbow and abdomen. Vitals obtained, pt appears in no obvious distress at this time. No other concerns voiced.       Karla Moctezuma RN  07/05/18 9353

## 2018-07-05 NOTE — ED TRIAGE NOTES
Pt presents to the ED by EMS with complaints of chest pain and a headache. Pt appears diaphoretic upon first arrival and is sweating. Pt complains of left sided chest pain and a headache on her right side of her head. Pt states that she has had a heart cath in the past and it revealed a 50% blockage. Pt denies any shortness of breath, but has a history of COPD. Pt placed on 2L O2 for comfort. Pt is also slightly hypotension at 101/77 and EMS reported similar readings en route. EKG done. Pt received x4 81mg ASA enroute. Pt admits to taking cocaine this morning.

## 2018-07-05 NOTE — ED NOTES
Bed: 015A  Expected date: 7/4/18  Expected time:   Means of arrival: [employer] Dept  Comments:      Fernando Gonsalez RN  07/04/18 6710

## 2018-07-23 NOTE — PROGRESS NOTES
NPO after midnight  [employer] and drivers license  Wear comfortable clean clothing  Do not bring jewelry   Shower night before and morning of surgery with a liquid antibacterial soap  Bring medications in original bottles  Follow all instructions given by your physician   needed at discharge  Call [phone] for any questions

## 2018-07-23 NOTE — PROGRESS NOTES
In preparation for their surgical procedure above patient was screened for Obstructive Sleep Apnea (MELISSA) using the STOP-Bang Questionnaire by the Pre-Admission Testing department. This is a pre-surgical screening tool for patient safety and serves as a recommendation, this WILL NOT cause cancellation of surgery. STOP-Bang Questionnaire  * Do you currently see a pulmonologist?  No     If yes STOP, do not complete. Patient follows with  .    1. Do you snore loudly (able to be heard in the next room)? Yes    2. Do you often feel tired or sleepy during the daytime? No       3. Has anyone ever told you that you stop breathing during your sleep? Yes    4. Do you have or are you being treated for high blood pressure? Yes      5. BMI more than 35? BMI (Calculated): 28        No    6. Age over 48 years? 66 y.o. Yes    7. Neck Circumference greater than 17 inches for male or 16 inches for female? Measured           (visits only)            Not Applicable    8. Gender Male? No      TOTAL SCORE: 4    MELISSA - Low Risk : Yes to 0 - 2 questions  MELISSA - Intermediate Risk : Yes to 3 - 4 questions  MELISSA - High Risk : Yes to 5 - 8 questions    Adapted from:   STOP Questionnaire: A Tool to Screen Patients for Obstructive Sleep Apnea   FORREST Balderrama.C.P.C., Marlen Carter M.B.B.S., Bakari Urias M.D., [facility]. Joaquín Galindo, Ph.D., Raghav Fields M.B.B.S., Yary Hernandez, M.Sc., Cynthia Gilbert M.D., Delmi Duncan. ELIF PressleyC.P.C.    Anesthesiology 2008; 447:805-21 Copyright 2008, the [address] of Anesthesiologists, [facility].   ----------------------------------------------------------------------------------------------------------------

## 2018-07-24 ENCOUNTER — OFFICE VISIT (OUTPATIENT)
Dept: ENT CLINIC | Age: 63
End: 2018-07-24

## 2018-07-24 VITALS
TEMPERATURE: 98.9 F | WEIGHT: 163 LBS | HEIGHT: 64 IN | RESPIRATION RATE: 12 BRPM | BODY MASS INDEX: 27.83 KG/M2 | SYSTOLIC BLOOD PRESSURE: 110 MMHG | DIASTOLIC BLOOD PRESSURE: 68 MMHG | HEART RATE: 72 BPM

## 2018-07-24 DIAGNOSIS — J34.2 DEVIATED NASAL SEPTUM: Primary | ICD-10-CM

## 2018-07-24 DIAGNOSIS — F14.10 COCAINE ABUSE (HCC): ICD-10-CM

## 2018-07-24 DIAGNOSIS — R51.9 RHINOGENIC HEADACHE: ICD-10-CM

## 2018-07-24 DIAGNOSIS — J34.3 HYPERTROPHY OF INFERIOR NASAL TURBINATE: ICD-10-CM

## 2018-07-24 DIAGNOSIS — R68.89 FORGETFULNESS: ICD-10-CM

## 2018-07-24 DIAGNOSIS — G93.9 RIGHT FRONTAL LOBE LESION: ICD-10-CM

## 2018-07-24 PROCEDURE — PREOPEXAM PRE-OP EXAM: Performed by: OTOLARYNGOLOGY

## 2018-07-24 RX ORDER — ATORVASTATIN CALCIUM 40 MG/1
40 TABLET, FILM COATED ORAL DAILY
COMMUNITY
End: 2018-10-25 | Stop reason: ALTCHOICE

## 2018-07-24 ASSESSMENT — ENCOUNTER SYMPTOMS
COLOR CHANGE: 0
CHOKING: 0
SORE THROAT: 0
TROUBLE SWALLOWING: 0
STRIDOR: 0
SHORTNESS OF BREATH: 0
WHEEZING: 0
FACIAL SWELLING: 0
VOICE CHANGE: 0
NAUSEA: 0
COUGH: 0
SINUS PRESSURE: 0
APNEA: 0
RHINORRHEA: 0
ABDOMINAL PAIN: 0
CHEST TIGHTNESS: 0
DIARRHEA: 0
VOMITING: 0

## 2018-07-29 RX ORDER — PREDNISONE 20 MG/1
20 TABLET ORAL DAILY
Qty: 4 TABLET | Refills: 0 | OUTPATIENT
Start: 2018-07-29 | End: 2018-08-01

## 2018-07-29 RX ORDER — HYDROCODONE BITARTRATE AND ACETAMINOPHEN 7.5; 325 MG/1; MG/1
1 TABLET ORAL EVERY 6 HOURS PRN
Qty: 20 TABLET | Refills: 0 | OUTPATIENT
Start: 2018-07-29 | End: 2018-08-03

## 2018-07-29 NOTE — H&P
Cassandra Puckett is a 66 y.o. female who was referred by No ref. provider found for:  Chief Complaint   Patient presents with    Pre-op Exam       Patient is here pre-op septoplasty/turbinates 7/30/18   .     HPI:      Cassandra Puckett is a 66 y.o. female who presents today for  pre-op visit.  She had been scheduled for septoplasty and partial submucous resection right inferior turbinate 5/9/2018 with Dr Star Ash. This was canceled because of a positive cocaine toxicology screen. Patient now states that she is completely clean and that is not an issue.   She understands that this is the last time I will schedule her   Patient has chronic mid-facial pain and nasal congestion.  She had CT facial bones 3/15/2018 after completing 28 days of Augmentin and nasal saline irrigations without improvement-  Showed no evidence of significant sinusitis.  She does have is a septal deviation with spur impacting the right middle turbinate, hypertrophy left inferior turbinate, along with some impactions posteriorly and high from the ethmoids.  This combined can cause nasal congestion and the headaches that she describes.     Patient has history of coronary artery disease on ASA and Plavix- followed by Dr Merlinda Howard. Danisha Negrete pre-op clearance from Dr Merlinda Howard.  .     History:            Allergies   Allergen Reactions    Seasonal Other (See Comments)       sneezing      Current Facility-Administered Medications          Current Outpatient Prescriptions   Medication Sig Dispense Refill    hyoscyamine  MCG TBCR extended release tablet Take by mouth 2 times daily        atorvastatin (LIPITOR) 40 MG tablet Take 40 mg by mouth daily        carvedilol (COREG) 3.125 MG tablet Take 1 tablet by mouth 2 times daily (with meals) 180 tablet 1    isosorbide dinitrate (ISORDIL) 10 MG tablet Take 1 tablet by mouth 3 times daily 270 tablet 1    clopidogrel (PLAVIX) 75 MG tablet TAKE 1 TABLET BY MOUTH DAILY 30 tablet 11    hydrochlorothiazide  Depression      Diabetes ([facility] [address]       HgA1c 7.2 3/2017 - started Metformin and FSBS <150    Diverticulitis of colon      GERD (gastroesophageal reflux disease)      Hiatal hernia      History of colonoscopy 2002    History of kidney stones      Hx of blood clots 6/17/2014     PE and collar bone area after shoulder surgery    Hyperlipidemia      Hypertension      Liver disease       enlarged liver - damaged with alcohol in past but no cirrhosis per patient    Pneumonia 7/24/2014    Suicidal thoughts       2015 [facility]    Thyroid disease      Vomiting      Wears dentures      Wears glasses           Past Surgical History         Past Surgical History:   Procedure Laterality Date    ABDOMEN SURGERY         x4 removed cysts and ovary removed    CARDIAC SURGERY         heart caths, no stents    CARPAL TUNNEL RELEASE Bilateral 2016    CHOLECYSTECTOMY, LAPAROSCOPIC   6/12/15     Dr. Lillie Pham    COLONOSCOPY   2011    DILATATION, ESOPHAGUS        ELBOW SURGERY Right 10/7/2004     Dr. Eugenie Corona Bilateral 2016     decompression   [place] Dr. Maria E Wilson with [address]   2004, 2014     right shoulder    SHOULDER SURGERY   2014     right    SKIN BIOPSY   2006     under left eye         Family History         Family History   Problem Relation Age of Onset    Cancer Mother      Heart Disease Mother      High Blood Pressure Mother      High Cholesterol Mother      Cancer Father           brain    Depression Father      Heart Disease Father      High Cholesterol Father      Mental Illness Father      Cancer Sister      Heart Attack Sister      Heart Disease Maternal Uncle      High Cholesterol Maternal Uncle      Diabetes Maternal Grandmother      Heart Disease Maternal Grandmother      High Cholesterol Maternal Grandmother      Early Death Maternal Grandfather      Heart Disease Maternal Grandfather      High Cholesterol Maternal Grandfather      Stroke Maternal Grandfather      Heart Disease Paternal Grandmother      High Cholesterol Paternal Grandmother      Heart Disease Paternal Grandfather      High Cholesterol Paternal Grandfather                  Social History   Substance Use Topics    Smoking status: Current Every Day Smoker       Packs/day: 0.50       Years: 30.00       Types: Cigarettes       Start date: 4/22/1977    Smokeless tobacco: Never Used         Comment: 4-5 a day 7/24/18    Alcohol use No         Comment: none for 2 years         Subjective:      Review of Systems   Constitutional: Negative for activity change, appetite change, chills, diaphoresis, fatigue, fever and unexpected weight change. HENT: Negative for congestion, dental problem, ear discharge, ear pain, facial swelling, hearing loss, mouth sores, nosebleeds, postnasal drip, rhinorrhea, sinus pressure, sneezing, sore throat, tinnitus, trouble swallowing and voice change. Eyes: Negative for visual disturbance. Respiratory: Negative for apnea, cough, choking, chest tightness, shortness of breath, wheezing and stridor. Cardiovascular: Negative for chest pain, palpitations and leg swelling. Gastrointestinal: Negative for abdominal pain, diarrhea, nausea and vomiting. Endocrine: Negative for cold intolerance, heat intolerance, polydipsia and polyuria. Genitourinary: Negative for dysuria, enuresis and hematuria. Musculoskeletal: Negative for arthralgias, gait problem, neck pain and neck stiffness. Skin: Negative for color change and rash. Allergic/Immunologic: Negative for environmental allergies, food allergies and immunocompromised state. Neurological: Negative for dizziness, syncope, facial asymmetry, speech difficulty, light-headedness and headaches. Hematological: Negative for adenopathy. Does not bruise/bleed easily.    Psychiatric/Behavioral: Negative for confusion and sleep palpation     Cardiovascular: Normal rate and regular rhythm. No murmur heard. Pulmonary/Chest: Effort normal and breath sounds normal. No stridor. Chest wall is not dull to percussion. Neurological: She is alert and oriented to person, place, and time. No cranial nerve deficit (VIIth N function intact bilat). Psychiatric: She has a normal mood and affect. Nursing note and vitals reviewed.        Data:  All of the past medical history, past surgical history, family history, social history, allergies and current medications were reviewed with the patient. Assessment & Plan   Diagnoses and all orders for this visit:       Diagnosis Orders   1. Deviated nasal septum      2. Hypertrophy of inferior nasal turbinate, left      3. Rhinogenic headache      4. Right frontal lobe lesion      5. Forgetfulness      6. Cocaine abuse            The findings were explained and her questions were answered. She is well aware of the benefits and risks of the procedure she had full informed consent before as well.     Plan surgical procedure:  Septoplasty  Partial SMR left inferior turbinate  Time estimate 1.5  hour        She is to hold her ASA and Plavix for 7 days, lisinopril and metformin for 2 days, and hold the trazodone and Lexapro the night before surgery. She should go ahead and take her inhalers, her Protonix,     Return in about 7 days (around 7/31/2018).       Aron Rush MD     **This report has been created using voice recognition software. It may contain minor errors which are inherent in voice recognition technology. **

## 2018-07-30 ENCOUNTER — HOSPITAL ENCOUNTER (OUTPATIENT)
Age: 63
Setting detail: OUTPATIENT SURGERY
Discharge: HOME | End: 2018-07-30
Attending: OTOLARYNGOLOGY | Admitting: OTOLARYNGOLOGY
Payer: COMMERCIAL

## 2018-07-30 ENCOUNTER — TELEPHONE (OUTPATIENT)
Dept: ENT CLINIC | Age: 63
End: 2018-07-30

## 2018-07-30 VITALS
DIASTOLIC BLOOD PRESSURE: 85 MMHG | TEMPERATURE: 96.4 F | RESPIRATION RATE: 20 BRPM | OXYGEN SATURATION: 94 % | BODY MASS INDEX: 28.27 KG/M2 | HEART RATE: 81 BPM | WEIGHT: 165.6 LBS | HEIGHT: 64 IN | SYSTOLIC BLOOD PRESSURE: 156 MMHG

## 2018-07-30 DIAGNOSIS — R51.9 RHINOGENIC HEADACHE: ICD-10-CM

## 2018-07-30 DIAGNOSIS — J34.3 HYPERTROPHY OF INFERIOR NASAL TURBINATE: ICD-10-CM

## 2018-07-30 DIAGNOSIS — J34.2 NASAL SEPTAL DEVIATION: Primary | ICD-10-CM

## 2018-07-30 LAB
AMPHETAMINE+METHAMPHETAMINE URINE SCREEN: POSITIVE
AMPHETAMINE+METHAMPHETAMINE URINE SCREEN: POSITIVE
BARBITURATE QUANTITATIVE URINE: NEGATIVE
BARBITURATE QUANTITATIVE URINE: NEGATIVE
BENZODIAZEPINE QUANTITATIVE URINE: NEGATIVE
BENZODIAZEPINE QUANTITATIVE URINE: NEGATIVE
CANNABINOID QUANTITATIVE URINE: NEGATIVE
CANNABINOID QUANTITATIVE URINE: NEGATIVE
COCAINE METABOLITE QUANTITATIVE URINE: POSITIVE
COCAINE METABOLITE QUANTITATIVE URINE: POSITIVE
GLUCOSE BLD-MCNC: 132 MG/DL (ref 70–108)
OPIATES, URINE: NEGATIVE
OPIATES, URINE: NEGATIVE
OXYCODONE: NEGATIVE
OXYCODONE: NEGATIVE
PHENCYCLIDINE QUANTITATIVE URINE: NEGATIVE
PHENCYCLIDINE QUANTITATIVE URINE: NEGATIVE
POTASSIUM SERPL-SCNC: 3.8 MEQ/L (ref 3.5–5.2)

## 2018-07-30 PROCEDURE — 84132 ASSAY OF SERUM POTASSIUM: CPT

## 2018-07-30 PROCEDURE — 36415 COLL VENOUS BLD VENIPUNCTURE: CPT

## 2018-07-30 PROCEDURE — 82948 REAGENT STRIP/BLOOD GLUCOSE: CPT

## 2018-07-30 PROCEDURE — 2580000003 HC RX 258: Performed by: OTOLARYNGOLOGY

## 2018-07-30 PROCEDURE — 80307 DRUG TEST PRSMV CHEM ANLYZR: CPT

## 2018-07-30 RX ORDER — SODIUM CHLORIDE 9 MG/ML
INJECTION, SOLUTION INTRAVENOUS ONCE
Status: COMPLETED | OUTPATIENT
Start: 2018-07-30 | End: 2018-07-30

## 2018-07-30 RX ORDER — DEXAMETHASONE SODIUM PHOSPHATE 4 MG/ML
12 INJECTION, SOLUTION INTRA-ARTICULAR; INTRALESIONAL; INTRAMUSCULAR; INTRAVENOUS; SOFT TISSUE
Status: DISCONTINUED | OUTPATIENT
Start: 2018-07-30 | End: 2018-07-30 | Stop reason: HOSPADM

## 2018-07-30 RX ADMIN — SODIUM CHLORIDE: 9 INJECTION, SOLUTION INTRAVENOUS at 08:19

## 2018-07-30 ASSESSMENT — PAIN - FUNCTIONAL ASSESSMENT: PAIN_FUNCTIONAL_ASSESSMENT: 0-10

## 2018-08-02 RX ORDER — HYDROCHLOROTHIAZIDE 25 MG/1
TABLET ORAL
Qty: 60 TABLET | Refills: 3 | Status: SHIPPED | OUTPATIENT
Start: 2018-08-02 | End: 2018-12-18 | Stop reason: SDUPTHER

## 2018-09-04 ENCOUNTER — HOSPITAL ENCOUNTER (EMERGENCY)
Age: 63
Discharge: HOME | End: 2018-09-04
Payer: COMMERCIAL

## 2018-09-04 VITALS
DIASTOLIC BLOOD PRESSURE: 89 MMHG | BODY MASS INDEX: 27.83 KG/M2 | RESPIRATION RATE: 18 BRPM | SYSTOLIC BLOOD PRESSURE: 140 MMHG | HEIGHT: 64 IN | TEMPERATURE: 98.4 F | WEIGHT: 163 LBS | HEART RATE: 74 BPM | OXYGEN SATURATION: 99 %

## 2018-09-04 DIAGNOSIS — F32.A DEPRESSION, UNSPECIFIED DEPRESSION TYPE: Primary | ICD-10-CM

## 2018-09-04 LAB
ALBUMIN SERPL-MCNC: 4.5 G/DL (ref 3.5–5.1)
ALP BLD-CCNC: 97 U/L (ref 38–126)
ALT SERPL-CCNC: 19 U/L (ref 11–66)
AMORPHOUS: ABNORMAL
AMPHETAMINE+METHAMPHETAMINE URINE SCREEN: POSITIVE
ANION GAP SERPL CALCULATED.3IONS-SCNC: 14 MEQ/L (ref 8–16)
AST SERPL-CCNC: 15 U/L (ref 5–40)
BACTERIA: ABNORMAL /HPF
BARBITURATE QUANTITATIVE URINE: NEGATIVE
BASOPHILS # BLD: 0.6 %
BASOPHILS ABSOLUTE: 0 THOU/MM3 (ref 0–0.1)
BENZODIAZEPINE QUANTITATIVE URINE: NEGATIVE
BILIRUB SERPL-MCNC: 0.4 MG/DL (ref 0.3–1.2)
BILIRUBIN DIRECT: < 0.2 MG/DL (ref 0–0.3)
BILIRUBIN URINE: ABNORMAL
BLOOD, URINE: NEGATIVE
BUN BLDV-MCNC: 5 MG/DL (ref 7–22)
CALCIUM SERPL-MCNC: 10.5 MG/DL (ref 8.5–10.5)
CANNABINOID QUANTITATIVE URINE: NEGATIVE
CASTS UA: ABNORMAL /LPF
CHARACTER, URINE: ABNORMAL
CHLORIDE BLD-SCNC: 100 MEQ/L (ref 98–111)
CO2: 26 MEQ/L (ref 23–33)
COCAINE METABOLITE QUANTITATIVE URINE: POSITIVE
COLOR: ABNORMAL
CREAT SERPL-MCNC: 0.6 MG/DL (ref 0.4–1.2)
CRYSTALS, UA: ABNORMAL
EKG ATRIAL RATE: 66 BPM
EKG P AXIS: 58 DEGREES
EKG P-R INTERVAL: 138 MS
EKG Q-T INTERVAL: 452 MS
EKG QRS DURATION: 84 MS
EKG QTC CALCULATION (BAZETT): 473 MS
EKG R AXIS: 1 DEGREES
EKG T AXIS: 73 DEGREES
EKG VENTRICULAR RATE: 66 BPM
EOSINOPHIL # BLD: 0.8 %
EOSINOPHILS ABSOLUTE: 0.1 THOU/MM3 (ref 0–0.4)
EPITHELIAL CELLS, UA: ABNORMAL /HPF
ERYTHROCYTE [DISTWIDTH] IN BLOOD BY AUTOMATED COUNT: 13.3 % (ref 11.5–14.5)
ERYTHROCYTE [DISTWIDTH] IN BLOOD BY AUTOMATED COUNT: 39.2 FL (ref 35–45)
ETHYL ALCOHOL, SERUM: < 0.01 %
GFR SERPL CREATININE-BSD FRML MDRD: > 90 ML/MIN/1.73M2
GLUCOSE BLD-MCNC: 173 MG/DL (ref 70–108)
GLUCOSE URINE: NEGATIVE MG/DL
HCT VFR BLD CALC: 43.8 % (ref 37–47)
HEMOGLOBIN: 14.7 GM/DL (ref 12–16)
ICTOTEST: NEGATIVE
IMMATURE GRANS (ABS): 0.01 THOU/MM3 (ref 0–0.07)
IMMATURE GRANULOCYTES: 0.2 %
KETONES, URINE: ABNORMAL
LEUKOCYTE ESTERASE, URINE: ABNORMAL
LYMPHOCYTES # BLD: 47.5 %
LYMPHOCYTES ABSOLUTE: 3 THOU/MM3 (ref 1–4.8)
MCH RBC QN AUTO: 27.5 PG (ref 26–33)
MCHC RBC AUTO-ENTMCNC: 33.6 GM/DL (ref 32.2–35.5)
MCV RBC AUTO: 82 FL (ref 81–99)
MONOCYTES # BLD: 5 %
MONOCYTES ABSOLUTE: 0.3 THOU/MM3 (ref 0.4–1.3)
NITRITE, URINE: NEGATIVE
NUCLEATED RED BLOOD CELLS: 0 /100 WBC
OPIATES, URINE: NEGATIVE
OSMOLALITY CALCULATION: 280.8 MOSMOL/KG (ref 275–300)
OXYCODONE: NEGATIVE
PH UA: 6
PHENCYCLIDINE QUANTITATIVE URINE: NEGATIVE
PLATELET # BLD: 274 THOU/MM3 (ref 130–400)
PMV BLD AUTO: 9.4 FL (ref 9.4–12.4)
POTASSIUM SERPL-SCNC: 3.4 MEQ/L (ref 3.5–5.2)
PROTEIN UA: 30
RBC # BLD: 5.34 MILL/MM3 (ref 4.2–5.4)
RBC URINE: ABNORMAL /HPF
SEG NEUTROPHILS: 45.9 %
SEGMENTED NEUTROPHILS ABSOLUTE COUNT: 2.9 THOU/MM3 (ref 1.8–7.7)
SODIUM BLD-SCNC: 140 MEQ/L (ref 135–145)
SPECIFIC GRAVITY, URINE: 1.03 (ref 1–1.03)
TOTAL PROTEIN: 7.3 G/DL (ref 6.1–8)
TSH SERPL DL<=0.05 MIU/L-ACNC: 1.34 UIU/ML (ref 0.4–4.2)
UROBILINOGEN, URINE: 1 EU/DL
WBC # BLD: 6.4 THOU/MM3 (ref 4.8–10.8)
WBC UA: ABNORMAL /HPF

## 2018-09-04 PROCEDURE — 84443 ASSAY THYROID STIM HORMONE: CPT

## 2018-09-04 PROCEDURE — 85025 COMPLETE CBC W/AUTO DIFF WBC: CPT

## 2018-09-04 PROCEDURE — G0480 DRUG TEST DEF 1-7 CLASSES: HCPCS

## 2018-09-04 PROCEDURE — 36415 COLL VENOUS BLD VENIPUNCTURE: CPT

## 2018-09-04 PROCEDURE — 87086 URINE CULTURE/COLONY COUNT: CPT

## 2018-09-04 PROCEDURE — 99285 EMERGENCY DEPT VISIT HI MDM: CPT

## 2018-09-04 PROCEDURE — 6370000000 HC RX 637 (ALT 250 FOR IP): Performed by: PHYSICIAN ASSISTANT

## 2018-09-04 PROCEDURE — 80053 COMPREHEN METABOLIC PANEL: CPT

## 2018-09-04 PROCEDURE — 93005 ELECTROCARDIOGRAM TRACING: CPT | Performed by: PHYSICIAN ASSISTANT

## 2018-09-04 PROCEDURE — 81001 URINALYSIS AUTO W/SCOPE: CPT

## 2018-09-04 PROCEDURE — 6360000002 HC RX W HCPCS: Performed by: PHYSICIAN ASSISTANT

## 2018-09-04 PROCEDURE — 80307 DRUG TEST PRSMV CHEM ANLYZR: CPT

## 2018-09-04 PROCEDURE — 82248 BILIRUBIN DIRECT: CPT

## 2018-09-04 RX ORDER — ACETAMINOPHEN 500 MG
1000 TABLET ORAL ONCE
Status: COMPLETED | OUTPATIENT
Start: 2018-09-04 | End: 2018-09-04

## 2018-09-04 RX ORDER — ONDANSETRON 4 MG/1
4 TABLET, ORALLY DISINTEGRATING ORAL ONCE
Status: COMPLETED | OUTPATIENT
Start: 2018-09-04 | End: 2018-09-04

## 2018-09-04 RX ORDER — IBUPROFEN 800 MG/1
800 TABLET ORAL ONCE
Status: COMPLETED | OUTPATIENT
Start: 2018-09-04 | End: 2018-09-04

## 2018-09-04 RX ORDER — HYDROXYZINE PAMOATE 25 MG/1
50 CAPSULE ORAL ONCE
Status: COMPLETED | OUTPATIENT
Start: 2018-09-04 | End: 2018-09-04

## 2018-09-04 RX ADMIN — IBUPROFEN 800 MG: 800 TABLET ORAL at 19:23

## 2018-09-04 RX ADMIN — ONDANSETRON 4 MG: 4 TABLET, ORALLY DISINTEGRATING ORAL at 19:23

## 2018-09-04 RX ADMIN — ACETAMINOPHEN 1000 MG: 500 TABLET, FILM COATED ORAL at 17:23

## 2018-09-04 RX ADMIN — HYDROXYZINE PAMOATE 50 MG: 25 CAPSULE ORAL at 17:23

## 2018-09-04 ASSESSMENT — ENCOUNTER SYMPTOMS
DIARRHEA: 0
RHINORRHEA: 0
NAUSEA: 0
SHORTNESS OF BREATH: 0
EYE PAIN: 0
COUGH: 0
EYE DISCHARGE: 0
BACK PAIN: 0
WHEEZING: 0
VOMITING: 0
ABDOMINAL PAIN: 0
SORE THROAT: 0

## 2018-09-04 ASSESSMENT — SLEEP AND FATIGUE QUESTIONNAIRES
DIFFICULTY FALLING ASLEEP: NO
DIFFICULTY ARISING: NO
DO YOU USE A SLEEP AID: YES
AVERAGE NUMBER OF SLEEP HOURS: 4
DIFFICULTY STAYING ASLEEP: YES
RESTFUL SLEEP: NO
DO YOU HAVE DIFFICULTY SLEEPING: YES

## 2018-09-04 ASSESSMENT — PAIN SCALES - GENERAL
PAINLEVEL_OUTOF10: 8

## 2018-09-04 ASSESSMENT — PAIN DESCRIPTION - LOCATION: LOCATION: HEAD

## 2018-09-04 ASSESSMENT — PATIENT HEALTH QUESTIONNAIRE - PHQ9
SUM OF ALL RESPONSES TO PHQ QUESTIONS 1-9: 22
SUM OF ALL RESPONSES TO PHQ QUESTIONS 1-9: 27

## 2018-09-04 ASSESSMENT — PAIN DESCRIPTION - PAIN TYPE
TYPE: ACUTE PAIN
TYPE: ACUTE PAIN

## 2018-09-04 ASSESSMENT — LIFESTYLE VARIABLES: HISTORY_ALCOHOL_USE: YES

## 2018-09-04 ASSESSMENT — PAIN DESCRIPTION - FREQUENCY: FREQUENCY: CONTINUOUS

## 2018-09-04 ASSESSMENT — PAIN DESCRIPTION - DESCRIPTORS: DESCRIPTORS: HEADACHE
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
## 2018-09-04 NOTE — ED PROVIDER NOTES
[facility]  eMERGENCY dEPARTMENT eNCOUnter          CHIEF COMPLAINT       Chief Complaint   Patient presents with    Addiction Problem     cocaine, alcohol       Nurses Notes reviewed and I agree except as noted in the HPI. HISTORY OF PRESENT ILLNESS    Randall Morton is a 66 y.o. female who presents to the Emergency Department for the evaluation of an addiction problem. The patient states she is a cocaine addict, and started drinking alcohol 2 weeks ago. She states her cat  last week, which made her drink more, and now she needs help with her addiction. Patient reports associated anxiety, and denies suicidal ideation now. The patient goes to [facility] for a history of suicidal ideation,  Bipolar disorder and Schizophrenia. She states she has been out of her medications for 4 days. No further complaints at the time of the initial encounter. The HPI was provided by the patient. REVIEW OF SYSTEMS     Review of Systems   Constitutional: Negative for appetite change, chills, fatigue and fever. HENT: Negative for congestion, ear pain, rhinorrhea and sore throat. Eyes: Negative for pain, discharge and visual disturbance. Respiratory: Negative for cough, shortness of breath and wheezing. Cardiovascular: Negative for chest pain, palpitations and leg swelling. Gastrointestinal: Negative for abdominal pain, diarrhea, nausea and vomiting. Genitourinary: Negative for difficulty urinating, dysuria, hematuria and vaginal discharge. Musculoskeletal: Negative for arthralgias, back pain, joint swelling and neck pain. Skin: Negative for pallor and rash. Neurological: Negative for dizziness, syncope, weakness, light-headedness, numbness and headaches. Hematological: Negative for adenopathy. Psychiatric/Behavioral: Negative for confusion and suicidal ideas. The patient is nervous/anxious. PAST MEDICAL HISTORY    has a past medical history of Allergic rhinitis;  Arthritis; ISOSORBIDE DINITRATE (ISORDIL) 10 MG TABLET    Take 1 tablet by mouth 3 times daily    LEVOTHYROXINE (SYNTHROID) 75 MCG TABLET    Take 75 mcg by mouth Daily     LISINOPRIL (PRINIVIL;ZESTRIL) 2.5 MG TABLET    Take 2.5 mg by mouth daily    METFORMIN (GLUCOPHAGE) 500 MG TABLET    Take 500 mg by mouth 2 times daily (with meals)     ONDANSETRON (ZOFRAN ODT) 4 MG DISINTEGRATING TABLET    Take 1 tablet by mouth every 8 hours as needed for Nausea    PANTOPRAZOLE (PROTONIX) 40 MG TABLET    Take 1 tablet by mouth daily Indications: Gastroesophageal Reflux Disease    QUETIAPINE (SEROQUEL XR) 300 MG EXTENDED RELEASE TABLET    Take 400 mg by mouth nightly     SUCRALFATE (CARAFATE) 1 GM/10ML SUSPENSION    Take 1 g by mouth 4 times daily    TRAZODONE (DESYREL) 100 MG TABLET    Take 100 mg by mouth nightly as needed for Sleep     TUDORZA PRESSAIR 400 MCG/ACT AEPB INHALER    1 puff 2 times daily        ALLERGIES     is allergic to seasonal.    FAMILY HISTORY     indicated that her mother is . She indicated that her father is . She indicated that her sister is alive. She indicated that her brother is alive. She indicated that the status of her maternal grandmother is unknown. She indicated that the status of her maternal grandfather is unknown. She indicated that the status of her paternal grandmother is unknown. She indicated that the status of her paternal grandfather is unknown. She indicated that the status of her maternal uncle is unknown.    family history includes Cancer in her father, mother, and sister; Depression in her father; Diabetes in her maternal grandmother; Early Death in her maternal grandfather; Heart Attack in her sister;  Heart Disease in her father, maternal grandfather, maternal grandmother, maternal uncle, mother, paternal grandfather, and paternal grandmother; High Blood Pressure in her mother; High Cholesterol in her father, maternal grandfather, maternal grandmother, maternal uncle, or Co-signs this chart in the absence of a cardiologist.    None    RADIOLOGY: non-plain film images(s) such as CT, Ultrasound and MRI are read by the radiologist.    None    LABS:     Labs Reviewed   CBC WITH AUTO DIFFERENTIAL - Abnormal; Notable for the following:        Result Value    Monocytes # 0.3 (*)     All other components within normal limits   BASIC METABOLIC PANEL - Abnormal; Notable for the following:     Potassium 3.4 (*)     Glucose 173 (*)     BUN 5 (*)     All other components within normal limits   URINE WITH REFLEXED MICRO - Abnormal; Notable for the following:     Bilirubin Urine SMALL (*)     Ketones, Urine TRACE (*)     Protein, UA 30 (*)     Leukocyte Esterase, Urine MODERATE (*)     Color, UA DK YELLOW (*)     Character, Urine CLOUDY (*)     All other components within normal limits   URINE CULTURE, REFLEXED    Narrative:     Source: urine, clean catch       Site:           Current Antibiotics: not stated   HEPATIC FUNCTION PANEL   ETHANOL   URINE DRUG SCREEN   TSH WITH REFLEX   ANION GAP   GLOMERULAR FILTRATION RATE, ESTIMATED   OSMOLALITY   BILE ACIDS, TOTAL       EMERGENCY DEPARTMENT COURSE:   Vitals:    Vitals:    09/04/18 1510 09/04/18 1708 09/04/18 1857   BP: (!) 143/91 (!) 145/89 (!) 145/84   Pulse: 67 71 78   Resp: 18 18 18   Temp: 98.4 °F (36.9 °C)     TempSrc: Oral     SpO2: 98% 94% 98%   Weight: 163 lb (73.9 kg)     Height: 5' 4\" (1.626 m)         3:07 PM: The patient was seen and evaluated. The patient was medically cleared. The patient did talk to psych and will go to the crisis stabilization unit. We'll discharge from here to go there. CRITICAL CARE:   None     CONSULTS:  Behavior access team    PROCEDURES:  None    FINAL IMPRESSION      1. Depression, unspecified depression type          DISPOSITION/PLAN   Discharged with instruction to go to [facility] now To the CSU    PATIENT REFERRED TO:  [place] [address]  [address] [place] [address]

## 2018-09-04 NOTE — PROGRESS NOTES
1920  Call from [facility] inquiring if pt can obtain her medical mediation from home if admitted to the [facility]. Clinician consulted with pt who reportedly receives all of her medication from [facility], medical and psychotropic, however recently missed her appointment. [address] [place] [address] pt missed her appointment on 8/30. [address] [place] [address] will attempt to obtain pts medication and follow up with Clinician.   (Pt informed)

## 2018-09-04 NOTE — PROGRESS NOTES
[address]  Clinician Summary and labs faxed to Martin Tang. [phone] faxed to [facility]. 1828  Call to [facility], consulted with Sissy Johnson who received the information via fax, will staff it with her supervisor and follow up with Clinician.

## 2018-09-04 NOTE — ED NOTES
Patient resting quietly in cot showing no signs of distress. Rates pain in head 10/10 and is requesting pain and anxiety medication. [place], [place] notified. Will continue to monitor.       Marvel Hou RN  09/04/18 2335

## 2018-09-05 PROCEDURE — 93010 ELECTROCARDIOGRAM REPORT: CPT | Performed by: NUCLEAR MEDICINE

## 2018-09-06 LAB
ORGANISM: ABNORMAL
URINE CULTURE REFLEX: ABNORMAL

## 2018-09-11 ENCOUNTER — TELEPHONE (OUTPATIENT)
Dept: ENT CLINIC | Age: 63
End: 2018-09-11

## 2018-09-21 RX ORDER — ISOSORBIDE DINITRATE 10 MG/1
10 TABLET ORAL 3 TIMES DAILY
Qty: 270 TABLET | Refills: 3 | Status: SHIPPED | OUTPATIENT
Start: 2018-09-21 | End: 2021-02-20

## 2018-09-21 RX ORDER — CARVEDILOL 3.12 MG/1
3.12 TABLET ORAL 2 TIMES DAILY WITH MEALS
Qty: 180 TABLET | Refills: 3 | Status: ON HOLD | OUTPATIENT
Start: 2018-09-21 | End: 2021-02-22 | Stop reason: HOSPADM

## 2018-10-04 ENCOUNTER — HOSPITAL ENCOUNTER (OUTPATIENT)
Dept: WOMENS IMAGING | Age: 63
Discharge: HOME | End: 2018-10-04
Payer: COMMERCIAL

## 2018-10-04 DIAGNOSIS — R92.2 BREAST DENSITY: ICD-10-CM

## 2018-10-04 DIAGNOSIS — Z09 FOLLOW-UP EXAM: ICD-10-CM

## 2018-10-04 PROCEDURE — G0279 TOMOSYNTHESIS, MAMMO: HCPCS

## 2018-10-09 ENCOUNTER — TELEPHONE (OUTPATIENT)
Dept: ENT CLINIC | Age: 63
End: 2018-10-09

## 2018-10-09 NOTE — TELEPHONE ENCOUNTER
Patient called and left a message that she would like to rescheduled sx, as she is \"clear\" now. I checked to see if dismissal letter had been mailed yet, and it was sent out 10/2. I called patient back and asked her if she received her letter yet, and she said no, is he denying me? Before I could explain anything, I had stated yes and she just hung up on me. I did verify her address when I first called, so she should be receiving letter.

## 2018-10-17 ENCOUNTER — HOSPITAL ENCOUNTER (EMERGENCY)
Age: 63
Discharge: HOME | End: 2018-10-18
Attending: FAMILY MEDICINE
Payer: COMMERCIAL

## 2018-10-17 DIAGNOSIS — G43.011 INTRACTABLE MIGRAINE WITHOUT AURA AND WITH STATUS MIGRAINOSUS: Primary | ICD-10-CM

## 2018-10-17 PROCEDURE — 99283 EMERGENCY DEPT VISIT LOW MDM: CPT

## 2018-10-17 RX ORDER — SUMATRIPTAN 6 MG/.5ML
6 INJECTION, SOLUTION SUBCUTANEOUS ONCE
Status: COMPLETED | OUTPATIENT
Start: 2018-10-18 | End: 2018-10-18

## 2018-10-17 RX ORDER — 0.9 % SODIUM CHLORIDE 0.9 %
1000 INTRAVENOUS SOLUTION INTRAVENOUS ONCE
Status: COMPLETED | OUTPATIENT
Start: 2018-10-18 | End: 2018-10-18

## 2018-10-17 RX ORDER — ONDANSETRON 2 MG/ML
4 INJECTION INTRAMUSCULAR; INTRAVENOUS ONCE
Status: COMPLETED | OUTPATIENT
Start: 2018-10-18 | End: 2018-10-18

## 2018-10-17 RX ORDER — FENTANYL CITRATE 50 UG/ML
100 INJECTION, SOLUTION INTRAMUSCULAR; INTRAVENOUS
Status: DISCONTINUED | OUTPATIENT
Start: 2018-10-17 | End: 2018-10-18 | Stop reason: HOSPADM

## 2018-10-17 ASSESSMENT — PAIN DESCRIPTION - LOCATION: LOCATION: HEAD

## 2018-10-17 ASSESSMENT — PAIN SCALES - GENERAL: PAINLEVEL_OUTOF10: 10

## 2018-10-17 ASSESSMENT — PAIN DESCRIPTION - DESCRIPTORS: DESCRIPTORS: ACHING

## 2018-10-17 ASSESSMENT — PAIN DESCRIPTION - PAIN TYPE: TYPE: ACUTE PAIN

## 2018-10-18 VITALS
OXYGEN SATURATION: 96 % | DIASTOLIC BLOOD PRESSURE: 69 MMHG | HEART RATE: 65 BPM | HEIGHT: 64 IN | TEMPERATURE: 97.6 F | RESPIRATION RATE: 17 BRPM | BODY MASS INDEX: 27.83 KG/M2 | SYSTOLIC BLOOD PRESSURE: 108 MMHG | WEIGHT: 163 LBS

## 2018-10-18 LAB
ALBUMIN SERPL-MCNC: 4 G/DL (ref 3.5–5.1)
ALP BLD-CCNC: 78 U/L (ref 38–126)
ALT SERPL-CCNC: 12 U/L (ref 11–66)
ANION GAP SERPL CALCULATED.3IONS-SCNC: 13 MEQ/L (ref 8–16)
APTT: 39 SECONDS (ref 22–38)
AST SERPL-CCNC: 10 U/L (ref 5–40)
BASOPHILS # BLD: 0.5 %
BASOPHILS ABSOLUTE: 0 THOU/MM3 (ref 0–0.1)
BILIRUB SERPL-MCNC: 0.3 MG/DL (ref 0.3–1.2)
BUN BLDV-MCNC: 9 MG/DL (ref 7–22)
CALCIUM SERPL-MCNC: 9.7 MG/DL (ref 8.5–10.5)
CHLORIDE BLD-SCNC: 99 MEQ/L (ref 98–111)
CO2: 26 MEQ/L (ref 23–33)
CREAT SERPL-MCNC: 0.7 MG/DL (ref 0.4–1.2)
EOSINOPHIL # BLD: 0.9 %
EOSINOPHILS ABSOLUTE: 0.1 THOU/MM3 (ref 0–0.4)
ERYTHROCYTE [DISTWIDTH] IN BLOOD BY AUTOMATED COUNT: 13.2 % (ref 11.5–14.5)
ERYTHROCYTE [DISTWIDTH] IN BLOOD BY AUTOMATED COUNT: 37.3 FL (ref 35–45)
GFR SERPL CREATININE-BSD FRML MDRD: 85 ML/MIN/1.73M2
GLUCOSE BLD-MCNC: 105 MG/DL (ref 70–108)
HCT VFR BLD CALC: 40.1 % (ref 37–47)
HEMOGLOBIN: 14 GM/DL (ref 12–16)
IMMATURE GRANS (ABS): 0.02 THOU/MM3 (ref 0–0.07)
IMMATURE GRANULOCYTES: 0.3 %
INR BLD: 1.07 (ref 0.85–1.13)
LYMPHOCYTES # BLD: 50.6 %
LYMPHOCYTES ABSOLUTE: 3.7 THOU/MM3 (ref 1–4.8)
MCH RBC QN AUTO: 27.7 PG (ref 26–33)
MCHC RBC AUTO-ENTMCNC: 34.9 GM/DL (ref 32.2–35.5)
MCV RBC AUTO: 79.2 FL (ref 81–99)
MONOCYTES # BLD: 6.5 %
MONOCYTES ABSOLUTE: 0.5 THOU/MM3 (ref 0.4–1.3)
NUCLEATED RED BLOOD CELLS: 0 /100 WBC
OSMOLALITY CALCULATION: 274.7 MOSMOL/KG (ref 275–300)
PLATELET # BLD: 289 THOU/MM3 (ref 130–400)
PMV BLD AUTO: 9 FL (ref 9.4–12.4)
POTASSIUM REFLEX MAGNESIUM: 3.6 MEQ/L (ref 3.5–5.2)
RBC # BLD: 5.06 MILL/MM3 (ref 4.2–5.4)
SEDIMENTATION RATE, ERYTHROCYTE: 17 MM/HR (ref 0–20)
SEG NEUTROPHILS: 41.2 %
SEGMENTED NEUTROPHILS ABSOLUTE COUNT: 3 THOU/MM3 (ref 1.8–7.7)
SODIUM BLD-SCNC: 138 MEQ/L (ref 135–145)
TOTAL PROTEIN: 6.9 G/DL (ref 6.1–8)
WBC # BLD: 7.4 THOU/MM3 (ref 4.8–10.8)

## 2018-10-18 PROCEDURE — 85651 RBC SED RATE NONAUTOMATED: CPT

## 2018-10-18 PROCEDURE — 96374 THER/PROPH/DIAG INJ IV PUSH: CPT

## 2018-10-18 PROCEDURE — 85025 COMPLETE CBC W/AUTO DIFF WBC: CPT

## 2018-10-18 PROCEDURE — 96376 TX/PRO/DX INJ SAME DRUG ADON: CPT

## 2018-10-18 PROCEDURE — 6360000002 HC RX W HCPCS: Performed by: FAMILY MEDICINE

## 2018-10-18 PROCEDURE — 96375 TX/PRO/DX INJ NEW DRUG ADDON: CPT

## 2018-10-18 PROCEDURE — 36415 COLL VENOUS BLD VENIPUNCTURE: CPT

## 2018-10-18 PROCEDURE — 2580000003 HC RX 258: Performed by: FAMILY MEDICINE

## 2018-10-18 PROCEDURE — 85730 THROMBOPLASTIN TIME PARTIAL: CPT

## 2018-10-18 PROCEDURE — 80053 COMPREHEN METABOLIC PANEL: CPT

## 2018-10-18 PROCEDURE — 85610 PROTHROMBIN TIME: CPT

## 2018-10-18 PROCEDURE — 96372 THER/PROPH/DIAG INJ SC/IM: CPT

## 2018-10-18 RX ORDER — BUTALBITAL, ACETAMINOPHEN, CAFFEINE AND CODEINE PHOSPHATE 50; 325; 40; 30 MG/1; MG/1; MG/1; MG/1
1 CAPSULE ORAL EVERY 4 HOURS PRN
Qty: 30 CAPSULE | Refills: 0 | Status: SHIPPED | OUTPATIENT
Start: 2018-10-18 | End: 2018-10-19

## 2018-10-18 RX ADMIN — SUMATRIPTAN 6 MG: 6 INJECTION, SOLUTION SUBCUTANEOUS at 01:00

## 2018-10-18 RX ADMIN — ONDANSETRON HYDROCHLORIDE 4 MG: 4 INJECTION, SOLUTION INTRAMUSCULAR; INTRAVENOUS at 00:37

## 2018-10-18 RX ADMIN — FENTANYL CITRATE 100 MCG: 50 INJECTION INTRAMUSCULAR; INTRAVENOUS at 00:37

## 2018-10-18 RX ADMIN — SODIUM CHLORIDE 1000 ML: 9 INJECTION, SOLUTION INTRAVENOUS at 00:37

## 2018-10-18 RX ADMIN — FENTANYL CITRATE 100 MCG: 50 INJECTION INTRAMUSCULAR; INTRAVENOUS at 02:38

## 2018-10-18 ASSESSMENT — ENCOUNTER SYMPTOMS
TROUBLE SWALLOWING: 0
WHEEZING: 0
NAUSEA: 0
PHOTOPHOBIA: 0
RESPIRATORY NEGATIVE: 1
COUGH: 0
GASTROINTESTINAL NEGATIVE: 1
ABDOMINAL PAIN: 0
SORE THROAT: 0
VOICE CHANGE: 0
BACK PAIN: 0
VOMITING: 0
EYE PAIN: 0

## 2018-10-18 ASSESSMENT — PAIN SCALES - GENERAL
PAINLEVEL_OUTOF10: 10
PAINLEVEL_OUTOF10: 7

## 2018-10-18 ASSESSMENT — PAIN DESCRIPTION - PROGRESSION: CLINICAL_PROGRESSION: NOT CHANGED

## 2018-10-18 NOTE — ED PROVIDER NOTES
HYDROXYZINE (VISTARIL) 50 MG CAPSULE    Take 50 mg by mouth 3 times daily as needed for Anxiety    HYOSCYAMINE  MCG TBCR EXTENDED RELEASE TABLET    Take by mouth 2 times daily    ISOSORBIDE DINITRATE (ISORDIL) 10 MG TABLET    TAKE 1 TABLET BY MOUTH 3 TIMES DAILY    LEVOTHYROXINE (SYNTHROID) 75 MCG TABLET    Take 75 mcg by mouth Daily     LISINOPRIL (PRINIVIL;ZESTRIL) 2.5 MG TABLET    Take 2.5 mg by mouth daily    METFORMIN (GLUCOPHAGE) 500 MG TABLET    Take 500 mg by mouth 2 times daily (with meals)     ONDANSETRON (ZOFRAN ODT) 4 MG DISINTEGRATING TABLET    Take 1 tablet by mouth every 8 hours as needed for Nausea    PANTOPRAZOLE (PROTONIX) 40 MG TABLET    Take 1 tablet by mouth daily Indications: Gastroesophageal Reflux Disease    QUETIAPINE (SEROQUEL XR) 300 MG EXTENDED RELEASE TABLET    Take 400 mg by mouth nightly     SUCRALFATE (CARAFATE) 1 GM/10ML SUSPENSION    Take 1 g by mouth 4 times daily    TRAZODONE (DESYREL) 100 MG TABLET    Take 100 mg by mouth nightly as needed for Sleep     TUDORZA PRESSAIR 400 MCG/ACT AEPB INHALER    1 puff 2 times daily        ALLERGIES     is allergic to seasonal.    FAMILY HISTORY     indicated that her mother is . She indicated that her father is . She indicated that her sister is alive. She indicated that her brother is alive. She indicated that the status of her maternal grandmother is unknown. She indicated that the status of her maternal grandfather is unknown. She indicated that the status of her paternal grandmother is unknown. She indicated that the status of her paternal grandfather is unknown. She indicated that the status of her maternal uncle is unknown.    family history includes Cancer in her father, mother, and sister; Depression in her father; Diabetes in her maternal grandmother; Early Death in her maternal grandfather; Heart Attack in her sister;  Heart Disease in her father, maternal grandfather, maternal grandmother, maternal uncle,

## 2018-10-19 ENCOUNTER — APPOINTMENT (OUTPATIENT)
Dept: CT IMAGING | Age: 63
End: 2018-10-19
Payer: COMMERCIAL

## 2018-10-19 ENCOUNTER — HOSPITAL ENCOUNTER (EMERGENCY)
Age: 63
Discharge: HOME | End: 2018-10-19
Payer: COMMERCIAL

## 2018-10-19 VITALS
TEMPERATURE: 98.5 F | HEIGHT: 64 IN | BODY MASS INDEX: 26.12 KG/M2 | WEIGHT: 153 LBS | DIASTOLIC BLOOD PRESSURE: 90 MMHG | OXYGEN SATURATION: 98 % | HEART RATE: 97 BPM | RESPIRATION RATE: 16 BRPM | SYSTOLIC BLOOD PRESSURE: 145 MMHG

## 2018-10-19 DIAGNOSIS — G43.709 CHRONIC MIGRAINE WITHOUT AURA WITHOUT STATUS MIGRAINOSUS, NOT INTRACTABLE: Primary | ICD-10-CM

## 2018-10-19 LAB
ALBUMIN SERPL-MCNC: 4.2 G/DL (ref 3.5–5.1)
ALP BLD-CCNC: 122 U/L (ref 38–126)
ALT SERPL-CCNC: 66 U/L (ref 11–66)
ANION GAP SERPL CALCULATED.3IONS-SCNC: 14 MEQ/L (ref 8–16)
AST SERPL-CCNC: 109 U/L (ref 5–40)
BASOPHILS # BLD: 0.6 %
BASOPHILS ABSOLUTE: 0 THOU/MM3 (ref 0–0.1)
BILIRUB SERPL-MCNC: 0.4 MG/DL (ref 0.3–1.2)
BUN BLDV-MCNC: 7 MG/DL (ref 7–22)
CALCIUM SERPL-MCNC: 9.8 MG/DL (ref 8.5–10.5)
CHLORIDE BLD-SCNC: 100 MEQ/L (ref 98–111)
CO2: 25 MEQ/L (ref 23–33)
CREAT SERPL-MCNC: 0.6 MG/DL (ref 0.4–1.2)
EOSINOPHIL # BLD: 0.6 %
EOSINOPHILS ABSOLUTE: 0 THOU/MM3 (ref 0–0.4)
ERYTHROCYTE [DISTWIDTH] IN BLOOD BY AUTOMATED COUNT: 13.3 % (ref 11.5–14.5)
ERYTHROCYTE [DISTWIDTH] IN BLOOD BY AUTOMATED COUNT: 38.8 FL (ref 35–45)
GFR SERPL CREATININE-BSD FRML MDRD: > 90 ML/MIN/1.73M2
GLUCOSE BLD-MCNC: 100 MG/DL (ref 70–108)
HCT VFR BLD CALC: 41.2 % (ref 37–47)
HEMOGLOBIN: 13.9 GM/DL (ref 12–16)
IMMATURE GRANS (ABS): 0.01 THOU/MM3 (ref 0–0.07)
IMMATURE GRANULOCYTES: 0.2 %
LYMPHOCYTES # BLD: 46.2 %
LYMPHOCYTES ABSOLUTE: 3 THOU/MM3 (ref 1–4.8)
MAGNESIUM: 1.7 MG/DL (ref 1.6–2.4)
MCH RBC QN AUTO: 27.4 PG (ref 26–33)
MCHC RBC AUTO-ENTMCNC: 33.7 GM/DL (ref 32.2–35.5)
MCV RBC AUTO: 81.3 FL (ref 81–99)
MONOCYTES # BLD: 6.6 %
MONOCYTES ABSOLUTE: 0.4 THOU/MM3 (ref 0.4–1.3)
NUCLEATED RED BLOOD CELLS: 0 /100 WBC
OSMOLALITY CALCULATION: 275.6 MOSMOL/KG (ref 275–300)
PLATELET # BLD: 274 THOU/MM3 (ref 130–400)
PMV BLD AUTO: 9.3 FL (ref 9.4–12.4)
POTASSIUM SERPL-SCNC: 3.6 MEQ/L (ref 3.5–5.2)
RBC # BLD: 5.07 MILL/MM3 (ref 4.2–5.4)
SEG NEUTROPHILS: 45.8 %
SEGMENTED NEUTROPHILS ABSOLUTE COUNT: 2.9 THOU/MM3 (ref 1.8–7.7)
SODIUM BLD-SCNC: 139 MEQ/L (ref 135–145)
TOTAL PROTEIN: 6.9 G/DL (ref 6.1–8)
TSH SERPL DL<=0.05 MIU/L-ACNC: 2.14 UIU/ML (ref 0.4–4.2)
WBC # BLD: 6.4 THOU/MM3 (ref 4.8–10.8)

## 2018-10-19 PROCEDURE — 80053 COMPREHEN METABOLIC PANEL: CPT

## 2018-10-19 PROCEDURE — 84443 ASSAY THYROID STIM HORMONE: CPT

## 2018-10-19 PROCEDURE — 96374 THER/PROPH/DIAG INJ IV PUSH: CPT

## 2018-10-19 PROCEDURE — 6360000002 HC RX W HCPCS: Performed by: NURSE PRACTITIONER

## 2018-10-19 PROCEDURE — 85025 COMPLETE CBC W/AUTO DIFF WBC: CPT

## 2018-10-19 PROCEDURE — 83735 ASSAY OF MAGNESIUM: CPT

## 2018-10-19 PROCEDURE — 99284 EMERGENCY DEPT VISIT MOD MDM: CPT

## 2018-10-19 PROCEDURE — 96375 TX/PRO/DX INJ NEW DRUG ADDON: CPT

## 2018-10-19 PROCEDURE — 2580000003 HC RX 258: Performed by: NURSE PRACTITIONER

## 2018-10-19 PROCEDURE — 36415 COLL VENOUS BLD VENIPUNCTURE: CPT

## 2018-10-19 PROCEDURE — 70450 CT HEAD/BRAIN W/O DYE: CPT

## 2018-10-19 RX ORDER — BUTALBITAL, ACETAMINOPHEN AND CAFFEINE 300; 40; 50 MG/1; MG/1; MG/1
1 CAPSULE ORAL EVERY 4 HOURS PRN
Qty: 20 CAPSULE | Refills: 0 | Status: ON HOLD | OUTPATIENT
Start: 2018-10-19 | End: 2018-10-31 | Stop reason: HOSPADM

## 2018-10-19 RX ORDER — DEXAMETHASONE SODIUM PHOSPHATE 4 MG/ML
4 INJECTION, SOLUTION INTRA-ARTICULAR; INTRALESIONAL; INTRAMUSCULAR; INTRAVENOUS; SOFT TISSUE ONCE
Status: COMPLETED | OUTPATIENT
Start: 2018-10-19 | End: 2018-10-19

## 2018-10-19 RX ORDER — KETOROLAC TROMETHAMINE 30 MG/ML
30 INJECTION, SOLUTION INTRAMUSCULAR; INTRAVENOUS ONCE
Status: COMPLETED | OUTPATIENT
Start: 2018-10-19 | End: 2018-10-19

## 2018-10-19 RX ORDER — DIPHENHYDRAMINE HYDROCHLORIDE 50 MG/ML
12.5 INJECTION INTRAMUSCULAR; INTRAVENOUS ONCE
Status: COMPLETED | OUTPATIENT
Start: 2018-10-19 | End: 2018-10-19

## 2018-10-19 RX ORDER — 0.9 % SODIUM CHLORIDE 0.9 %
1000 INTRAVENOUS SOLUTION INTRAVENOUS ONCE
Status: COMPLETED | OUTPATIENT
Start: 2018-10-19 | End: 2018-10-19

## 2018-10-19 RX ADMIN — SODIUM CHLORIDE 1000 ML: 9 INJECTION, SOLUTION INTRAVENOUS at 10:01

## 2018-10-19 RX ADMIN — DIPHENHYDRAMINE HYDROCHLORIDE 12.5 MG: 50 INJECTION, SOLUTION INTRAMUSCULAR; INTRAVENOUS at 10:05

## 2018-10-19 RX ADMIN — DEXAMETHASONE SODIUM PHOSPHATE 4 MG: 4 INJECTION, SOLUTION INTRAMUSCULAR; INTRAVENOUS at 11:08

## 2018-10-19 RX ADMIN — PROCHLORPERAZINE EDISYLATE 10 MG: 5 INJECTION INTRAMUSCULAR; INTRAVENOUS at 10:05

## 2018-10-19 RX ADMIN — KETOROLAC TROMETHAMINE 30 MG: 30 INJECTION, SOLUTION INTRAMUSCULAR at 10:05

## 2018-10-19 ASSESSMENT — ENCOUNTER SYMPTOMS
ABDOMINAL DISTENTION: 0
NAUSEA: 0
VOMITING: 0
VOICE CHANGE: 0
CONSTIPATION: 0
ABDOMINAL PAIN: 0
COLOR CHANGE: 0
WHEEZING: 0
SHORTNESS OF BREATH: 0
EYE REDNESS: 0
PHOTOPHOBIA: 1
SORE THROAT: 0
SINUS PRESSURE: 0
RHINORRHEA: 1
BACK PAIN: 0
DIARRHEA: 0
COUGH: 0
BLOOD IN STOOL: 0
CHEST TIGHTNESS: 0

## 2018-10-19 ASSESSMENT — PAIN DESCRIPTION - PAIN TYPE: TYPE: ACUTE PAIN

## 2018-10-19 ASSESSMENT — PAIN DESCRIPTION - LOCATION: LOCATION: HEAD

## 2018-10-19 ASSESSMENT — PAIN SCALES - GENERAL: PAINLEVEL_OUTOF10: 10

## 2018-10-25 ENCOUNTER — APPOINTMENT (OUTPATIENT)
Dept: CT IMAGING | Age: 63
DRG: 773 | End: 2018-10-25
Payer: COMMERCIAL

## 2018-10-25 ENCOUNTER — HOSPITAL ENCOUNTER (INPATIENT)
Age: 63
LOS: 6 days | Discharge: HOME | DRG: 773 | End: 2018-10-31
Attending: FAMILY MEDICINE | Admitting: PSYCHIATRY & NEUROLOGY
Payer: COMMERCIAL

## 2018-10-25 DIAGNOSIS — F33.9 EPISODE OF RECURRENT MAJOR DEPRESSIVE DISORDER, UNSPECIFIED DEPRESSION EPISODE SEVERITY (HCC): Primary | ICD-10-CM

## 2018-10-25 DIAGNOSIS — F19.10 POLYSUBSTANCE ABUSE (HCC): ICD-10-CM

## 2018-10-25 DIAGNOSIS — R45.851 SUICIDAL IDEATION: ICD-10-CM

## 2018-10-25 DIAGNOSIS — N39.0 URINARY TRACT INFECTION IN FEMALE: ICD-10-CM

## 2018-10-25 PROBLEM — F29 PSYCHOSIS (HCC): Status: ACTIVE | Noted: 2018-10-25

## 2018-10-25 LAB
ALBUMIN SERPL-MCNC: 4.3 G/DL (ref 3.5–5.1)
ALP BLD-CCNC: 99 U/L (ref 38–126)
ALT SERPL-CCNC: 24 U/L (ref 11–66)
AMPHETAMINE+METHAMPHETAMINE URINE SCREEN: POSITIVE
ANION GAP SERPL CALCULATED.3IONS-SCNC: 15 MEQ/L (ref 8–16)
APTT: 36.9 SECONDS (ref 22–38)
AST SERPL-CCNC: 21 U/L (ref 5–40)
BACTERIA: ABNORMAL /HPF
BARBITURATE QUANTITATIVE URINE: POSITIVE
BASOPHILS # BLD: 0.4 %
BASOPHILS ABSOLUTE: 0 THOU/MM3 (ref 0–0.1)
BENZODIAZEPINE QUANTITATIVE URINE: NEGATIVE
BILIRUB SERPL-MCNC: 0.3 MG/DL (ref 0.3–1.2)
BILIRUBIN URINE: ABNORMAL
BLOOD, URINE: NEGATIVE
BUN BLDV-MCNC: 7 MG/DL (ref 7–22)
CALCIUM SERPL-MCNC: 10.4 MG/DL (ref 8.5–10.5)
CANNABINOID QUANTITATIVE URINE: NEGATIVE
CASTS 2: ABNORMAL /LPF
CASTS UA: ABNORMAL /LPF
CHARACTER, URINE: ABNORMAL
CHLORIDE BLD-SCNC: 96 MEQ/L (ref 98–111)
CO2: 25 MEQ/L (ref 23–33)
COCAINE METABOLITE QUANTITATIVE URINE: POSITIVE
COLOR: YELLOW
CREAT SERPL-MCNC: 0.7 MG/DL (ref 0.4–1.2)
CRYSTALS, UA: ABNORMAL
EOSINOPHIL # BLD: 0.5 %
EOSINOPHILS ABSOLUTE: 0 THOU/MM3 (ref 0–0.4)
EPITHELIAL CELLS, UA: ABNORMAL /HPF
ERYTHROCYTE [DISTWIDTH] IN BLOOD BY AUTOMATED COUNT: 13.4 % (ref 11.5–14.5)
ERYTHROCYTE [DISTWIDTH] IN BLOOD BY AUTOMATED COUNT: 38.2 FL (ref 35–45)
ETHYL ALCOHOL, SERUM: < 0.01 %
GFR SERPL CREATININE-BSD FRML MDRD: 85 ML/MIN/1.73M2
GLUCOSE BLD-MCNC: 123 MG/DL (ref 70–108)
GLUCOSE URINE: NEGATIVE MG/DL
HCT VFR BLD CALC: 44.6 % (ref 37–47)
HEMOGLOBIN: 15.1 GM/DL (ref 12–16)
ICTOTEST: NEGATIVE
IMMATURE GRANS (ABS): 0.02 THOU/MM3 (ref 0–0.07)
IMMATURE GRANULOCYTES: 0.2 %
INR BLD: 1.02 (ref 0.85–1.13)
KETONES, URINE: NEGATIVE
LEUKOCYTE ESTERASE, URINE: ABNORMAL
LIPASE: 15 U/L (ref 5.6–51.3)
LYMPHOCYTES # BLD: 44.3 %
LYMPHOCYTES ABSOLUTE: 4.3 THOU/MM3 (ref 1–4.8)
MCH RBC QN AUTO: 27 PG (ref 26–33)
MCHC RBC AUTO-ENTMCNC: 33.9 GM/DL (ref 32.2–35.5)
MCV RBC AUTO: 79.8 FL (ref 81–99)
MISCELLANEOUS 2: ABNORMAL
MONOCYTES # BLD: 4.3 %
MONOCYTES ABSOLUTE: 0.4 THOU/MM3 (ref 0.4–1.3)
NITRITE, URINE: NEGATIVE
NUCLEATED RED BLOOD CELLS: 0 /100 WBC
OPIATES, URINE: POSITIVE
OSMOLALITY CALCULATION: 271.3 MOSMOL/KG (ref 275–300)
OXYCODONE: NEGATIVE
PH UA: 6.5
PHENCYCLIDINE QUANTITATIVE URINE: NEGATIVE
PLATELET # BLD: 284 THOU/MM3 (ref 130–400)
PMV BLD AUTO: 9.6 FL (ref 9.4–12.4)
POTASSIUM REFLEX MAGNESIUM: 3.8 MEQ/L (ref 3.5–5.2)
PROTEIN UA: NEGATIVE
RBC # BLD: 5.59 MILL/MM3 (ref 4.2–5.4)
RBC URINE: ABNORMAL /HPF
RENAL EPITHELIAL, UA: ABNORMAL
SEG NEUTROPHILS: 50.3 %
SEGMENTED NEUTROPHILS ABSOLUTE COUNT: 4.9 THOU/MM3 (ref 1.8–7.7)
SODIUM BLD-SCNC: 136 MEQ/L (ref 135–145)
SPECIFIC GRAVITY, URINE: 1.02 (ref 1–1.03)
TOTAL PROTEIN: 7.3 G/DL (ref 6.1–8)
TROPONIN T: < 0.01 NG/ML
TSH SERPL DL<=0.05 MIU/L-ACNC: 3.1 UIU/ML (ref 0.4–4.2)
UROBILINOGEN, URINE: 1 EU/DL
WBC # BLD: 9.7 THOU/MM3 (ref 4.8–10.8)
WBC UA: ABNORMAL /HPF
YEAST: ABNORMAL

## 2018-10-25 PROCEDURE — 83690 ASSAY OF LIPASE: CPT

## 2018-10-25 PROCEDURE — 85610 PROTHROMBIN TIME: CPT

## 2018-10-25 PROCEDURE — 80307 DRUG TEST PRSMV CHEM ANLYZR: CPT

## 2018-10-25 PROCEDURE — 80053 COMPREHEN METABOLIC PANEL: CPT

## 2018-10-25 PROCEDURE — 2709999900 HC NON-CHARGEABLE SUPPLY

## 2018-10-25 PROCEDURE — 6370000000 HC RX 637 (ALT 250 FOR IP): Performed by: PHYSICIAN ASSISTANT

## 2018-10-25 PROCEDURE — 70450 CT HEAD/BRAIN W/O DYE: CPT

## 2018-10-25 PROCEDURE — 81001 URINALYSIS AUTO W/SCOPE: CPT

## 2018-10-25 PROCEDURE — 6360000002 HC RX W HCPCS: Performed by: PSYCHIATRY & NEUROLOGY

## 2018-10-25 PROCEDURE — 6370000000 HC RX 637 (ALT 250 FOR IP): Performed by: PSYCHIATRY & NEUROLOGY

## 2018-10-25 PROCEDURE — 84484 ASSAY OF TROPONIN QUANT: CPT

## 2018-10-25 PROCEDURE — 84443 ASSAY THYROID STIM HORMONE: CPT

## 2018-10-25 PROCEDURE — 99285 EMERGENCY DEPT VISIT HI MDM: CPT

## 2018-10-25 PROCEDURE — 99222 1ST HOSP IP/OBS MODERATE 55: CPT | Performed by: PSYCHIATRY & NEUROLOGY

## 2018-10-25 PROCEDURE — 6370000000 HC RX 637 (ALT 250 FOR IP): Performed by: FAMILY MEDICINE

## 2018-10-25 PROCEDURE — 85025 COMPLETE CBC W/AUTO DIFF WBC: CPT

## 2018-10-25 PROCEDURE — 36415 COLL VENOUS BLD VENIPUNCTURE: CPT

## 2018-10-25 PROCEDURE — APPSS180 APP SPLIT SHARED TIME > 60 MINUTES: Performed by: PHYSICIAN ASSISTANT

## 2018-10-25 PROCEDURE — 85730 THROMBOPLASTIN TIME PARTIAL: CPT

## 2018-10-25 PROCEDURE — G0480 DRUG TEST DEF 1-7 CLASSES: HCPCS

## 2018-10-25 PROCEDURE — 1240000000 HC EMOTIONAL WELLNESS R&B

## 2018-10-25 PROCEDURE — 6370000000 HC RX 637 (ALT 250 FOR IP): Performed by: EMERGENCY MEDICINE

## 2018-10-25 PROCEDURE — 87086 URINE CULTURE/COLONY COUNT: CPT

## 2018-10-25 RX ORDER — ALBUTEROL SULFATE 90 UG/1
2 AEROSOL, METERED RESPIRATORY (INHALATION) EVERY 6 HOURS PRN
Status: DISCONTINUED | OUTPATIENT
Start: 2018-10-25 | End: 2018-10-31 | Stop reason: HOSPADM

## 2018-10-25 RX ORDER — PANTOPRAZOLE SODIUM 40 MG/1
40 TABLET, DELAYED RELEASE ORAL DAILY
Status: DISCONTINUED | OUTPATIENT
Start: 2018-10-25 | End: 2018-10-31 | Stop reason: HOSPADM

## 2018-10-25 RX ORDER — HYDROXYZINE PAMOATE 50 MG/1
50 CAPSULE ORAL 3 TIMES DAILY PRN
Status: DISCONTINUED | OUTPATIENT
Start: 2018-10-25 | End: 2018-10-31 | Stop reason: HOSPADM

## 2018-10-25 RX ORDER — HYOSCYAMINE SULFATE EXTENDED-RELEASE 0.38 MG/1
375 TABLET ORAL 2 TIMES DAILY
Status: DISCONTINUED | OUTPATIENT
Start: 2018-10-25 | End: 2018-10-31 | Stop reason: HOSPADM

## 2018-10-25 RX ORDER — BUTALBITAL, ACETAMINOPHEN AND CAFFEINE 50; 325; 40 MG/1; MG/1; MG/1
1 TABLET ORAL EVERY 4 HOURS PRN
Status: DISCONTINUED | OUTPATIENT
Start: 2018-10-25 | End: 2018-10-31 | Stop reason: HOSPADM

## 2018-10-25 RX ORDER — LEVOTHYROXINE SODIUM 0.15 MG/1
150 TABLET ORAL WEEKLY
Status: DISCONTINUED | OUTPATIENT
Start: 2018-10-25 | End: 2018-10-26 | Stop reason: CLARIF

## 2018-10-25 RX ORDER — LEVOTHYROXINE SODIUM 0.15 MG/1
150 TABLET ORAL WEEKLY
COMMUNITY
End: 2019-06-05 | Stop reason: DRUGHIGH

## 2018-10-25 RX ORDER — HYDROCHLOROTHIAZIDE 25 MG/1
25 TABLET ORAL 2 TIMES DAILY
Status: DISCONTINUED | OUTPATIENT
Start: 2018-10-25 | End: 2018-10-31 | Stop reason: HOSPADM

## 2018-10-25 RX ORDER — NICOTINE 21 MG/24HR
1 PATCH, TRANSDERMAL 24 HOURS TRANSDERMAL DAILY
Status: DISCONTINUED | OUTPATIENT
Start: 2018-10-25 | End: 2018-10-31 | Stop reason: HOSPADM

## 2018-10-25 RX ORDER — CIPROFLOXACIN 500 MG/1
500 TABLET, FILM COATED ORAL EVERY 12 HOURS SCHEDULED
Status: COMPLETED | OUTPATIENT
Start: 2018-10-25 | End: 2018-10-27

## 2018-10-25 RX ORDER — ISOSORBIDE DINITRATE 10 MG/1
10 TABLET ORAL 3 TIMES DAILY
Status: DISCONTINUED | OUTPATIENT
Start: 2018-10-25 | End: 2018-10-31 | Stop reason: HOSPADM

## 2018-10-25 RX ORDER — CLOPIDOGREL BISULFATE 75 MG/1
75 TABLET ORAL DAILY
Status: DISCONTINUED | OUTPATIENT
Start: 2018-10-25 | End: 2018-10-31 | Stop reason: HOSPADM

## 2018-10-25 RX ORDER — ALBUTEROL SULFATE 90 UG/1
2 AEROSOL, METERED RESPIRATORY (INHALATION) 2 TIMES DAILY
Status: DISCONTINUED | OUTPATIENT
Start: 2018-10-25 | End: 2018-10-31 | Stop reason: HOSPADM

## 2018-10-25 RX ORDER — ACETAMINOPHEN 325 MG/1
650 TABLET ORAL ONCE
Status: COMPLETED | OUTPATIENT
Start: 2018-10-25 | End: 2018-10-25

## 2018-10-25 RX ORDER — QUETIAPINE 200 MG/1
400 TABLET, FILM COATED, EXTENDED RELEASE ORAL NIGHTLY
Status: DISCONTINUED | OUTPATIENT
Start: 2018-10-25 | End: 2018-10-26

## 2018-10-25 RX ORDER — ALOSETRON HYDROCHLORIDE 0.5 MG/1
0.5 TABLET, FILM COATED ORAL 2 TIMES DAILY
COMMUNITY
End: 2019-02-21

## 2018-10-25 RX ORDER — TRAZODONE HYDROCHLORIDE 100 MG/1
200 TABLET ORAL NIGHTLY PRN
Status: DISCONTINUED | OUTPATIENT
Start: 2018-10-25 | End: 2018-10-31 | Stop reason: HOSPADM

## 2018-10-25 RX ORDER — ONDANSETRON 4 MG/1
4 TABLET, ORALLY DISINTEGRATING ORAL EVERY 12 HOURS PRN
Status: DISCONTINUED | OUTPATIENT
Start: 2018-10-25 | End: 2018-10-27

## 2018-10-25 RX ORDER — HYDROXYZINE HYDROCHLORIDE 25 MG/1
50 TABLET, FILM COATED ORAL ONCE
Status: COMPLETED | OUTPATIENT
Start: 2018-10-25 | End: 2018-10-25

## 2018-10-25 RX ORDER — ACETAMINOPHEN 325 MG/1
650 TABLET ORAL EVERY 4 HOURS PRN
Status: DISCONTINUED | OUTPATIENT
Start: 2018-10-25 | End: 2018-10-31 | Stop reason: HOSPADM

## 2018-10-25 RX ORDER — CARVEDILOL 3.12 MG/1
3.12 TABLET ORAL 2 TIMES DAILY WITH MEALS
Status: DISCONTINUED | OUTPATIENT
Start: 2018-10-25 | End: 2018-10-31 | Stop reason: HOSPADM

## 2018-10-25 RX ORDER — LEVOTHYROXINE SODIUM 0.07 MG/1
75 TABLET ORAL
Status: DISCONTINUED | OUTPATIENT
Start: 2018-10-25 | End: 2018-10-31 | Stop reason: HOSPADM

## 2018-10-25 RX ORDER — LISINOPRIL 2.5 MG/1
2.5 TABLET ORAL DAILY
Status: DISCONTINUED | OUTPATIENT
Start: 2018-10-25 | End: 2018-10-31 | Stop reason: HOSPADM

## 2018-10-25 RX ORDER — ALOSETRON HYDROCHLORIDE 0.5 MG/1
0.5 TABLET, FILM COATED ORAL 2 TIMES DAILY
Status: DISCONTINUED | OUTPATIENT
Start: 2018-10-25 | End: 2018-10-25 | Stop reason: CLARIF

## 2018-10-25 RX ORDER — FERROUS SULFATE 325(65) MG
325 TABLET ORAL
Status: DISCONTINUED | OUTPATIENT
Start: 2018-10-25 | End: 2018-10-31 | Stop reason: HOSPADM

## 2018-10-25 RX ADMIN — HYOSCYAMINE SULFATE 375 MCG: 0.38 TABLET, EXTENDED RELEASE ORAL at 21:08

## 2018-10-25 RX ADMIN — FERROUS SULFATE TAB 325 MG (65 MG ELEMENTAL FE) 325 MG: 325 (65 FE) TAB at 08:59

## 2018-10-25 RX ADMIN — LEVOTHYROXINE SODIUM 150 MCG: 150 TABLET ORAL at 09:05

## 2018-10-25 RX ADMIN — BUTALBITAL, ACETAMINOPHEN, AND CAFFEINE 1 TABLET: 50; 325; 40 TABLET ORAL at 15:34

## 2018-10-25 RX ADMIN — ISOSORBIDE DINITRATE 10 MG: 10 TABLET ORAL at 14:54

## 2018-10-25 RX ADMIN — LISINOPRIL 2.5 MG: 2.5 TABLET ORAL at 08:59

## 2018-10-25 RX ADMIN — ONDANSETRON 4 MG: 4 TABLET, ORALLY DISINTEGRATING ORAL at 19:50

## 2018-10-25 RX ADMIN — CARVEDILOL 3.12 MG: 3.12 TABLET, FILM COATED ORAL at 16:23

## 2018-10-25 RX ADMIN — CLOPIDOGREL BISULFATE 75 MG: 75 TABLET, FILM COATED ORAL at 08:59

## 2018-10-25 RX ADMIN — HYDROCHLOROTHIAZIDE 25 MG: 25 TABLET ORAL at 08:58

## 2018-10-25 RX ADMIN — CIPROFLOXACIN 500 MG: 500 TABLET, FILM COATED ORAL at 21:08

## 2018-10-25 RX ADMIN — PANTOPRAZOLE SODIUM 40 MG: 40 TABLET, DELAYED RELEASE ORAL at 08:59

## 2018-10-25 RX ADMIN — HYDROCHLOROTHIAZIDE 25 MG: 25 TABLET ORAL at 16:23

## 2018-10-25 RX ADMIN — HYOSCYAMINE SULFATE 375 MCG: 0.38 TABLET, EXTENDED RELEASE ORAL at 10:49

## 2018-10-25 RX ADMIN — ESCITALOPRAM 30 MG: 20 TABLET, FILM COATED ORAL at 08:59

## 2018-10-25 RX ADMIN — CARVEDILOL 3.12 MG: 3.12 TABLET, FILM COATED ORAL at 08:59

## 2018-10-25 RX ADMIN — ISOSORBIDE DINITRATE 10 MG: 10 TABLET ORAL at 21:08

## 2018-10-25 RX ADMIN — HYDROXYZINE PAMOATE 50 MG: 50 CAPSULE ORAL at 19:50

## 2018-10-25 RX ADMIN — ACETAMINOPHEN 650 MG: 325 TABLET ORAL at 03:23

## 2018-10-25 RX ADMIN — HYDROXYZINE HYDROCHLORIDE 50 MG: 25 TABLET, FILM COATED ORAL at 04:07

## 2018-10-25 RX ADMIN — BUTALBITAL, ACETAMINOPHEN, AND CAFFEINE 1 TABLET: 50; 325; 40 TABLET ORAL at 10:49

## 2018-10-25 RX ADMIN — TRAZODONE HYDROCHLORIDE 200 MG: 100 TABLET ORAL at 21:08

## 2018-10-25 RX ADMIN — ONDANSETRON 4 MG: 4 TABLET, ORALLY DISINTEGRATING ORAL at 08:59

## 2018-10-25 RX ADMIN — BUTALBITAL, ACETAMINOPHEN, AND CAFFEINE 1 TABLET: 50; 325; 40 TABLET ORAL at 21:53

## 2018-10-25 RX ADMIN — CIPROFLOXACIN 500 MG: 500 TABLET, FILM COATED ORAL at 05:29

## 2018-10-25 RX ADMIN — QUETIAPINE FUMARATE 400 MG: 200 TABLET, EXTENDED RELEASE ORAL at 21:08

## 2018-10-25 RX ADMIN — ISOSORBIDE DINITRATE 10 MG: 10 TABLET ORAL at 08:59

## 2018-10-25 ASSESSMENT — PAIN SCALES - GENERAL
PAINLEVEL_OUTOF10: 8
PAINLEVEL_OUTOF10: 10
PAINLEVEL_OUTOF10: 10
PAINLEVEL_OUTOF10: 4
PAINLEVEL_OUTOF10: 10
PAINLEVEL_OUTOF10: 5
PAINLEVEL_OUTOF10: 9
PAINLEVEL_OUTOF10: 9

## 2018-10-25 ASSESSMENT — SLEEP AND FATIGUE QUESTIONNAIRES
SLEEP PATTERN: DIFFICULTY FALLING ASLEEP
DO YOU USE A SLEEP AID: NO
AVERAGE NUMBER OF SLEEP HOURS: 4
DO YOU HAVE DIFFICULTY SLEEPING: YES
AVERAGE NUMBER OF SLEEP HOURS: 4
RESTFUL SLEEP: NO
DIFFICULTY STAYING ASLEEP: YES
DIFFICULTY FALLING ASLEEP: YES
SLEEP PATTERN: DIFFICULTY FALLING ASLEEP
RESTFUL SLEEP: NO
DO YOU HAVE DIFFICULTY SLEEPING: YES
DIFFICULTY STAYING ASLEEP: YES
DIFFICULTY ARISING: NO
DO YOU USE A SLEEP AID: NO
DIFFICULTY FALLING ASLEEP: YES

## 2018-10-25 ASSESSMENT — PAIN DESCRIPTION - PAIN TYPE
TYPE: ACUTE PAIN

## 2018-10-25 ASSESSMENT — LIFESTYLE VARIABLES
HISTORY_ALCOHOL_USE: NO
HISTORY_ALCOHOL_USE: NO

## 2018-10-25 ASSESSMENT — PAIN DESCRIPTION - PROGRESSION: CLINICAL_PROGRESSION: NOT CHANGED

## 2018-10-25 ASSESSMENT — PAIN DESCRIPTION - FREQUENCY: FREQUENCY: CONTINUOUS

## 2018-10-25 ASSESSMENT — PAIN DESCRIPTION - DESCRIPTORS
DESCRIPTORS: POUNDING;HEADACHE
DESCRIPTORS: POUNDING;HEADACHE

## 2018-10-25 ASSESSMENT — PAIN DESCRIPTION - LOCATION
LOCATION: HEAD

## 2018-10-25 ASSESSMENT — PATIENT HEALTH QUESTIONNAIRE - PHQ9: SUM OF ALL RESPONSES TO PHQ QUESTIONS 1-9: 16

## 2018-10-25 ASSESSMENT — PAIN DESCRIPTION - ONSET: ONSET: ON-GOING

## 2018-10-25 ASSESSMENT — PAIN DESCRIPTION - ORIENTATION: ORIENTATION: MID

## 2018-10-25 NOTE — H&P
tablet, Take 200 mg by mouth nightly as needed for Sleep   TUDORZA PRESSAIR 400 MCG/ACT AEPB inhaler, 1 puff 2 times daily   lisinopril (PRINIVIL;ZESTRIL) 2.5 MG tablet, Take 2.5 mg by mouth daily  ferrous sulfate 325 (65 FE) MG tablet, Take 325 mg by mouth daily (with breakfast)   QUEtiapine (SEROQUEL XR) 300 MG extended release tablet, Take 400 mg by mouth nightly   levothyroxine (SYNTHROID) 75 MCG tablet, Take 75 mcg by mouth Six times weekly everyday except Sunday take 150 mcg.   pantoprazole (PROTONIX) 40 MG tablet, Take 1 tablet by mouth daily Indications: Gastroesophageal Reflux Disease  albuterol (PROVENTIL HFA;VENTOLIN HFA) 108 (90 BASE) MCG/ACT inhaler, Inhale 2 puffs into the lungs every 6 hours as needed for Wheezing   butalbital-APAP-caffeine (FIORICET) -40 MG CAPS per capsule, Take 1 capsule by mouth every 4 hours as needed for Headaches or Migraine  ondansetron (ZOFRAN ODT) 4 MG disintegrating tablet, Take 1 tablet by mouth every 8 hours as needed for Nausea (Patient taking differently: Take 4 mg by mouth every 12 hours as needed for Nausea or Vomiting )  clobetasol (TEMOVATE) 0.05 % cream, Apply topically 2 times daily to corners of mouth as needed  metFORMIN (GLUCOPHAGE) 500 MG tablet, Take 500 mg by mouth 2 times daily (with meals)   Allergies:  Seasonal    Social History:  :   X4  Children:  3 children  Lives: in [facility]    Family History:   Family History   Problem Relation Age of Onset    Cancer Mother     Heart Disease Mother     High Blood Pressure Mother     High Cholesterol Mother     Cancer Father         brain    Depression Father     Heart Disease Father     High Cholesterol Father     Mental Illness Father     Cancer Sister     Heart Attack Sister     Heart Disease Maternal Uncle     High Cholesterol Maternal Uncle     Diabetes Maternal Grandmother     Heart Disease Maternal Grandmother     High Cholesterol Maternal Grandmother     Early Death Maternal

## 2018-10-25 NOTE — ED PROVIDER NOTES
Urine LARGE (A) NEGATIVE    Color, UA YELLOW STRAW-YELL    Character, Urine CLOUDY (A) CLEAR-SL C    RBC, UA 3-5 0-2/hpf /hpf    WBC, UA 25-50 0-4/hpf /hpf    Epi Cells 5-10 3-5/hpf /hpf    Bacteria, UA FEW FEW/NONE S /hpf    Casts UA >15 HYALINE NONE SEEN /lpf    Crystals NONE SEEN NONE SEEN    Renal Epithelial, Urine NONE SEEN NONE SEEN    Yeast, UA NONE SEEN NONE SEEN    CASTS 2 NONE SEEN NONE SEEN /lpf    MISCELLANEOUS 2 NONE SEEN    Bile Acids, Total   Result Value Ref Range    Ictotest NEGATIVE NEGATIVE       Imaging Studies  CT Head WO Contrast   Final Result   1. Left posterior parietal scalp changes. 2. Cerebral atrophy without acute pathology. **This report has been created using voice recognition software. It may contain minor errors which are inherent in voice recognition technology. **      Final report electronically signed by Dr. Elba Crabtree on 10/25/2018 2:11 AM            IMPRESSION  1. Episode of recurrent major depressive disorder, unspecified depression episode severity ([facility] [address]    2. Suicidal ideation    3. Self-inflicted injury    4. Urinary tract infection in female    5.  Polysubstance abuse ([address]        DISPOSITION and PLAN  Admit     Ugo Matos MD  10/25/18 7947

## 2018-10-26 ENCOUNTER — TELEPHONE (OUTPATIENT)
Dept: CARDIOLOGY CLINIC | Age: 63
End: 2018-10-26

## 2018-10-26 PROBLEM — F19.959 SUBSTANCE-INDUCED PSYCHOTIC DISORDER (HCC): Status: ACTIVE | Noted: 2018-10-26

## 2018-10-26 LAB
HCT VFR BLD CALC: 48.7 % (ref 37–47)
HEMOGLOBIN: 16.7 GM/DL (ref 12–16)
ORGANISM: ABNORMAL
URINE CULTURE REFLEX: ABNORMAL

## 2018-10-26 PROCEDURE — 6370000000 HC RX 637 (ALT 250 FOR IP): Performed by: EMERGENCY MEDICINE

## 2018-10-26 PROCEDURE — 6360000002 HC RX W HCPCS: Performed by: PSYCHIATRY & NEUROLOGY

## 2018-10-26 PROCEDURE — 6370000000 HC RX 637 (ALT 250 FOR IP): Performed by: PSYCHIATRY & NEUROLOGY

## 2018-10-26 PROCEDURE — 6370000000 HC RX 637 (ALT 250 FOR IP): Performed by: PHYSICIAN ASSISTANT

## 2018-10-26 PROCEDURE — 36415 COLL VENOUS BLD VENIPUNCTURE: CPT

## 2018-10-26 PROCEDURE — 85018 HEMOGLOBIN: CPT

## 2018-10-26 PROCEDURE — 94760 N-INVAS EAR/PLS OXIMETRY 1: CPT

## 2018-10-26 PROCEDURE — 99252 IP/OBS CONSLTJ NEW/EST SF 35: CPT | Performed by: PHYSICIAN ASSISTANT

## 2018-10-26 PROCEDURE — 1240000000 HC EMOTIONAL WELLNESS R&B

## 2018-10-26 PROCEDURE — 94640 AIRWAY INHALATION TREATMENT: CPT

## 2018-10-26 PROCEDURE — 85014 HEMATOCRIT: CPT

## 2018-10-26 RX ORDER — LEVOTHYROXINE SODIUM 0.15 MG/1
150 TABLET ORAL WEEKLY
Status: DISCONTINUED | OUTPATIENT
Start: 2018-10-28 | End: 2018-10-31 | Stop reason: HOSPADM

## 2018-10-26 RX ADMIN — CARVEDILOL 3.12 MG: 3.12 TABLET, FILM COATED ORAL at 16:33

## 2018-10-26 RX ADMIN — HYDROCHLOROTHIAZIDE 25 MG: 25 TABLET ORAL at 08:02

## 2018-10-26 RX ADMIN — BUTALBITAL, ACETAMINOPHEN, AND CAFFEINE 1 TABLET: 50; 325; 40 TABLET ORAL at 08:03

## 2018-10-26 RX ADMIN — CARVEDILOL 3.12 MG: 3.12 TABLET, FILM COATED ORAL at 08:02

## 2018-10-26 RX ADMIN — HYDROCHLOROTHIAZIDE 25 MG: 25 TABLET ORAL at 16:32

## 2018-10-26 RX ADMIN — CIPROFLOXACIN 500 MG: 500 TABLET, FILM COATED ORAL at 08:02

## 2018-10-26 RX ADMIN — CIPROFLOXACIN 500 MG: 500 TABLET, FILM COATED ORAL at 21:26

## 2018-10-26 RX ADMIN — HYOSCYAMINE SULFATE 375 MCG: 0.38 TABLET, EXTENDED RELEASE ORAL at 21:26

## 2018-10-26 RX ADMIN — Medication 2 PUFF: at 19:43

## 2018-10-26 RX ADMIN — ISOSORBIDE DINITRATE 10 MG: 10 TABLET ORAL at 21:26

## 2018-10-26 RX ADMIN — Medication 2 PUFF: at 19:42

## 2018-10-26 RX ADMIN — ESCITALOPRAM 30 MG: 20 TABLET, FILM COATED ORAL at 08:01

## 2018-10-26 RX ADMIN — ISOSORBIDE DINITRATE 10 MG: 10 TABLET ORAL at 08:01

## 2018-10-26 RX ADMIN — Medication 2 PUFF: at 09:58

## 2018-10-26 RX ADMIN — TRAZODONE HYDROCHLORIDE 200 MG: 100 TABLET ORAL at 21:27

## 2018-10-26 RX ADMIN — BUTALBITAL, ACETAMINOPHEN, AND CAFFEINE 1 TABLET: 50; 325; 40 TABLET ORAL at 21:26

## 2018-10-26 RX ADMIN — ISOSORBIDE DINITRATE 10 MG: 10 TABLET ORAL at 13:25

## 2018-10-26 RX ADMIN — Medication 2 PUFF: at 09:57

## 2018-10-26 RX ADMIN — PANTOPRAZOLE SODIUM 40 MG: 40 TABLET, DELAYED RELEASE ORAL at 08:02

## 2018-10-26 RX ADMIN — HYOSCYAMINE SULFATE 375 MCG: 0.38 TABLET, EXTENDED RELEASE ORAL at 08:01

## 2018-10-26 RX ADMIN — LISINOPRIL 2.5 MG: 2.5 TABLET ORAL at 08:01

## 2018-10-26 RX ADMIN — Medication 2 PUFF: at 15:42

## 2018-10-26 RX ADMIN — ONDANSETRON 4 MG: 4 TABLET, ORALLY DISINTEGRATING ORAL at 10:19

## 2018-10-26 RX ADMIN — QUETIAPINE FUMARATE 500 MG: 300 TABLET, EXTENDED RELEASE ORAL at 21:26

## 2018-10-26 RX ADMIN — BUTALBITAL, ACETAMINOPHEN, AND CAFFEINE 1 TABLET: 50; 325; 40 TABLET ORAL at 15:06

## 2018-10-26 RX ADMIN — FERROUS SULFATE TAB 325 MG (65 MG ELEMENTAL FE) 325 MG: 325 (65 FE) TAB at 08:02

## 2018-10-26 RX ADMIN — LEVOTHYROXINE SODIUM 75 MCG: 75 TABLET ORAL at 08:02

## 2018-10-26 RX ADMIN — CLOPIDOGREL BISULFATE 75 MG: 75 TABLET, FILM COATED ORAL at 08:02

## 2018-10-26 ASSESSMENT — PAIN SCALES - GENERAL
PAINLEVEL_OUTOF10: 6
PAINLEVEL_OUTOF10: 1
PAINLEVEL_OUTOF10: 5
PAINLEVEL_OUTOF10: 3
PAINLEVEL_OUTOF10: 5

## 2018-10-26 ASSESSMENT — PAIN DESCRIPTION - LOCATION
LOCATION: HEAD
LOCATION: HEAD

## 2018-10-26 ASSESSMENT — PAIN DESCRIPTION - ONSET: ONSET: ON-GOING

## 2018-10-26 ASSESSMENT — PAIN DESCRIPTION - DESCRIPTORS: DESCRIPTORS: HEADACHE

## 2018-10-26 ASSESSMENT — PAIN DESCRIPTION - PAIN TYPE: TYPE: ACUTE PAIN

## 2018-10-26 ASSESSMENT — PAIN DESCRIPTION - PROGRESSION: CLINICAL_PROGRESSION: NOT CHANGED

## 2018-10-26 ASSESSMENT — PAIN DESCRIPTION - FREQUENCY: FREQUENCY: CONTINUOUS

## 2018-10-26 NOTE — CONSULTS
mg by mouth daily   Yes Historical Provider, MD   ferrous sulfate 325 (65 FE) MG tablet Take 325 mg by mouth daily (with breakfast)    Yes Historical Provider, MD   QUEtiapine (SEROQUEL XR) 300 MG extended release tablet Take 400 mg by mouth nightly    Yes Historical Provider, MD   levothyroxine (SYNTHROID) 75 MCG tablet Take 75 mcg by mouth Six times weekly everyday except Sunday take 150 mcg. Yes Historical Provider, MD   pantoprazole (PROTONIX) 40 MG tablet Take 1 tablet by mouth daily Indications: Gastroesophageal Reflux Disease 4/15/16  Yes Loki Cadena MD   albuterol (PROVENTIL HFA;VENTOLIN HFA) 108 (90 BASE) MCG/ACT inhaler Inhale 2 puffs into the lungs every 6 hours as needed for Wheezing    Yes Historical Provider, MD   butalbital-APAP-caffeine (FIORICET) -40 MG CAPS per capsule Take 1 capsule by mouth every 4 hours as needed for Headaches or Migraine 10/19/18   Abhishek Minor Counts, APRN - CNP   ondansetron (ZOFRAN ODT) 4 MG disintegrating tablet Take 1 tablet by mouth every 8 hours as needed for Nausea  Patient taking differently: Take 4 mg by mouth every 12 hours as needed for Nausea or Vomiting  12/17/17   Christen Ngo PA-C   clobetasol (TEMOVATE) 0.05 % cream Apply topically 2 times daily to corners of mouth as needed    Historical Provider, MD   metFORMIN (GLUCOPHAGE) 500 MG tablet Take 500 mg by mouth 2 times daily (with meals)     Historical Provider, MD       Allergies:  Seasonal    Social History:      The patient currently lives at home     TOBACCO:   reports that she has been smoking Cigarettes. She started smoking about 41 years ago. She has a 15.00 pack-year smoking history. She has never used smokeless tobacco.  ETOH:   reports that she does not drink alcohol. Family History:     Reviewed in detail and negative for DM, CAD, Cancer, CVA.  Positive as follows:    Family History   Problem Relation Age of Onset    Cancer Mother     Heart Disease Mother     High Blood Pressure sounds. Musculoskeletal: No clubbing, cyanosis or edema bilaterally. Skin: Skin color, texture, turgor normal.  No rashes or lesions. No signs of blood in mouth. Neurologic:  Neurovascularly intact without any focal sensory/motor deficits. Cranial nerves: II-XII intact, grossly non-focal.  Psychiatric: Alert and oriented  Capillary Refill: Brisk,< 3 seconds   Peripheral Pulses: +2 palpable, equal bilaterally     Labs:     Recent Labs      10/25/18   0128  10/26/18   1223   WBC  9.7   --    HGB  15.1  16.7*   HCT  44.6  48.7*   PLT  284   --      Recent Labs      10/25/18   0128   NA  136   K  3.8   CL  96*   CO2  25   BUN  7   CREATININE  0.7   CALCIUM  10.4     Recent Labs      10/25/18   0128   AST  21   ALT  24   BILITOT  0.3   ALKPHOS  99     Recent Labs      10/25/18   0128   INR  1.02     No results for input(s): Lalaa Revel in the last 72 hours. Urinalysis:    Lab Results   Component Value Date    NITRU NEGATIVE 10/25/2018    WBCUA 25-50 10/25/2018    WBCUA 0-2 04/19/2012    BACTERIA FEW 10/25/2018    RBCUA 3-5 10/25/2018    BLOODU NEGATIVE 10/25/2018    SPECGRAV 1.026 06/07/2016    GLUCOSEU NEGATIVE 10/25/2018       Radiology: I have reviewed the radiology reports with the following interpretation:     CT Head WO Contrast   Final Result   1. Left posterior parietal scalp changes. 2. Cerebral atrophy without acute pathology. **This report has been created using voice recognition software. It may contain minor errors which are inherent in voice recognition technology. **      Final report electronically signed by Dr. Kirk Prior on 10/25/2018 2:11 AM          DVT prophylaxis: [] Lovenox                                 [] SCDs                                 [] SQ Heparin                                 [x] Encourage ambulation           [] Already on Anticoagulation      Code Status: Full Code    PT/OT Eval Status: None at this time       ASSESSMENT:    [address] [place] [address] Diagnosis Date Noted    Psychosis ([facility] [address] Adamaris Sims 10/25/2018       PLAN:    1. Hematemesis    - On admission, there was no mention of N/V. Pt has WNL BP and HR.    - She has lost 5 pounds since being in the hospital probably 2/2 dehydration.    - H&H today (10/26) to check Hgb which was higher than when she was admitted at 16.7     - I believe this increase is 2/2 dehydration     2. Bipolar Disorder, Type 1    - Pt is currently in the Psych Unit 2/2 suicidal ideation.    - Psych following     3. Substance Abuse Disorder    - Pt was positive for Cocaine, Cannabis, Amphetamines, Barbiturates & Opiates on her arrival to the ED     Internal Medicine will sign off 2/2 normal Hgb. Pt was encouraged to see GI for these issues as an outpatient. Thank you for the consultation.     Electronically signed by JENN Linares on 10/26/2018 at 11:21 AM

## 2018-10-26 NOTE — BH NOTE
PLAN OF CARE:     Start Time: 0900  End Time:  0930    Group Topic:  Daily Goals    Group Type:   Goal Group    Intervention/Goal:  To increase support and identify daily goals    Attendance: pt was not in group attendance but participated in 1:1    Affect: flat    Behavior: pt was cooperative during 1:1    Response: pt responded by identifying a daily goal    Daily Goal: \"Get rid of my headache\"    Progress: ongoing.

## 2018-10-27 LAB — GLUCOSE BLD-MCNC: 145 MG/DL (ref 70–108)

## 2018-10-27 PROCEDURE — 6360000002 HC RX W HCPCS: Performed by: PSYCHIATRY & NEUROLOGY

## 2018-10-27 PROCEDURE — 6370000000 HC RX 637 (ALT 250 FOR IP): Performed by: PHYSICIAN ASSISTANT

## 2018-10-27 PROCEDURE — 6370000000 HC RX 637 (ALT 250 FOR IP): Performed by: EMERGENCY MEDICINE

## 2018-10-27 PROCEDURE — 99999 PR OFFICE/OUTPT VISIT,PROCEDURE ONLY: CPT | Performed by: PSYCHIATRY & NEUROLOGY

## 2018-10-27 PROCEDURE — 99231 SBSQ HOSP IP/OBS SF/LOW 25: CPT | Performed by: NURSE PRACTITIONER

## 2018-10-27 PROCEDURE — 94640 AIRWAY INHALATION TREATMENT: CPT

## 2018-10-27 PROCEDURE — 82948 REAGENT STRIP/BLOOD GLUCOSE: CPT

## 2018-10-27 PROCEDURE — 6370000000 HC RX 637 (ALT 250 FOR IP): Performed by: PSYCHIATRY & NEUROLOGY

## 2018-10-27 PROCEDURE — 1240000000 HC EMOTIONAL WELLNESS R&B

## 2018-10-27 RX ORDER — ONDANSETRON 4 MG/1
4 TABLET, ORALLY DISINTEGRATING ORAL EVERY 6 HOURS PRN
Status: DISCONTINUED | OUTPATIENT
Start: 2018-10-27 | End: 2018-10-31 | Stop reason: HOSPADM

## 2018-10-27 RX ADMIN — ISOSORBIDE DINITRATE 10 MG: 10 TABLET ORAL at 08:33

## 2018-10-27 RX ADMIN — Medication 2 PUFF: at 21:27

## 2018-10-27 RX ADMIN — TRAZODONE HYDROCHLORIDE 200 MG: 100 TABLET ORAL at 21:58

## 2018-10-27 RX ADMIN — CARVEDILOL 3.12 MG: 3.12 TABLET, FILM COATED ORAL at 08:33

## 2018-10-27 RX ADMIN — LISINOPRIL 2.5 MG: 2.5 TABLET ORAL at 08:33

## 2018-10-27 RX ADMIN — Medication 2 PUFF: at 08:46

## 2018-10-27 RX ADMIN — HYDROCHLOROTHIAZIDE 25 MG: 25 TABLET ORAL at 17:21

## 2018-10-27 RX ADMIN — ESCITALOPRAM 30 MG: 20 TABLET, FILM COATED ORAL at 08:33

## 2018-10-27 RX ADMIN — ONDANSETRON 4 MG: 4 TABLET, ORALLY DISINTEGRATING ORAL at 08:35

## 2018-10-27 RX ADMIN — CARVEDILOL 3.12 MG: 3.12 TABLET, FILM COATED ORAL at 17:21

## 2018-10-27 RX ADMIN — CIPROFLOXACIN 500 MG: 500 TABLET, FILM COATED ORAL at 08:32

## 2018-10-27 RX ADMIN — LEVOTHYROXINE SODIUM 75 MCG: 75 TABLET ORAL at 08:32

## 2018-10-27 RX ADMIN — ISOSORBIDE DINITRATE 10 MG: 10 TABLET ORAL at 21:55

## 2018-10-27 RX ADMIN — PANTOPRAZOLE SODIUM 40 MG: 40 TABLET, DELAYED RELEASE ORAL at 08:05

## 2018-10-27 RX ADMIN — FERROUS SULFATE TAB 325 MG (65 MG ELEMENTAL FE) 325 MG: 325 (65 FE) TAB at 08:32

## 2018-10-27 RX ADMIN — BUTALBITAL, ACETAMINOPHEN, AND CAFFEINE 1 TABLET: 50; 325; 40 TABLET ORAL at 08:35

## 2018-10-27 RX ADMIN — HYOSCYAMINE SULFATE 375 MCG: 0.38 TABLET, EXTENDED RELEASE ORAL at 21:55

## 2018-10-27 RX ADMIN — HYOSCYAMINE SULFATE 375 MCG: 0.38 TABLET, EXTENDED RELEASE ORAL at 08:33

## 2018-10-27 RX ADMIN — Medication 2 PUFF: at 18:06

## 2018-10-27 RX ADMIN — BUTALBITAL, ACETAMINOPHEN, AND CAFFEINE 1 TABLET: 50; 325; 40 TABLET ORAL at 19:16

## 2018-10-27 RX ADMIN — HYDROCHLOROTHIAZIDE 25 MG: 25 TABLET ORAL at 08:33

## 2018-10-27 RX ADMIN — HYDROXYZINE PAMOATE 50 MG: 50 CAPSULE ORAL at 21:58

## 2018-10-27 RX ADMIN — ONDANSETRON 4 MG: 4 TABLET, ORALLY DISINTEGRATING ORAL at 15:24

## 2018-10-27 RX ADMIN — Medication 2 PUFF: at 13:06

## 2018-10-27 RX ADMIN — CLOPIDOGREL BISULFATE 75 MG: 75 TABLET, FILM COATED ORAL at 08:35

## 2018-10-27 RX ADMIN — QUETIAPINE FUMARATE 500 MG: 300 TABLET, EXTENDED RELEASE ORAL at 21:55

## 2018-10-27 RX ADMIN — HYDROXYZINE PAMOATE 50 MG: 50 CAPSULE ORAL at 15:24

## 2018-10-27 RX ADMIN — CIPROFLOXACIN 500 MG: 500 TABLET, FILM COATED ORAL at 21:55

## 2018-10-27 RX ADMIN — ISOSORBIDE DINITRATE 10 MG: 10 TABLET ORAL at 14:09

## 2018-10-27 ASSESSMENT — PAIN DESCRIPTION - ONSET: ONSET: ON-GOING

## 2018-10-27 ASSESSMENT — PAIN DESCRIPTION - PAIN TYPE: TYPE: ACUTE PAIN

## 2018-10-27 ASSESSMENT — PAIN SCALES - GENERAL
PAINLEVEL_OUTOF10: 7

## 2018-10-27 ASSESSMENT — PAIN DESCRIPTION - FREQUENCY: FREQUENCY: CONTINUOUS

## 2018-10-27 ASSESSMENT — PAIN DESCRIPTION - PROGRESSION: CLINICAL_PROGRESSION: NOT CHANGED

## 2018-10-27 ASSESSMENT — PAIN DESCRIPTION - LOCATION: LOCATION: HEAD

## 2018-10-27 ASSESSMENT — PAIN DESCRIPTION - DESCRIPTORS: DESCRIPTORS: HEADACHE

## 2018-10-27 ASSESSMENT — PAIN DESCRIPTION - ORIENTATION: ORIENTATION: MID

## 2018-10-27 NOTE — FLOWSHEET NOTE
10/27/18 1514   Provider Notification   Reason for Communication New orders; Review case   Provider Name Tuan Joseph   Provider Notification Physician   Method of Communication Call   Response See orders   Notification Time 1510   Pt feeling emesis,  vomited requested changed in frequency for Zofran from every 12 hrs to 1 tablet every 6 hrs prn

## 2018-10-28 PROCEDURE — 99231 SBSQ HOSP IP/OBS SF/LOW 25: CPT | Performed by: NURSE PRACTITIONER

## 2018-10-28 PROCEDURE — 1240000000 HC EMOTIONAL WELLNESS R&B

## 2018-10-28 PROCEDURE — 6370000000 HC RX 637 (ALT 250 FOR IP): Performed by: PSYCHIATRY & NEUROLOGY

## 2018-10-28 PROCEDURE — 94640 AIRWAY INHALATION TREATMENT: CPT

## 2018-10-28 PROCEDURE — 6370000000 HC RX 637 (ALT 250 FOR IP): Performed by: PHYSICIAN ASSISTANT

## 2018-10-28 PROCEDURE — 6360000002 HC RX W HCPCS: Performed by: PSYCHIATRY & NEUROLOGY

## 2018-10-28 RX ADMIN — HYOSCYAMINE SULFATE 375 MCG: 0.38 TABLET, EXTENDED RELEASE ORAL at 21:02

## 2018-10-28 RX ADMIN — BUTALBITAL, ACETAMINOPHEN, AND CAFFEINE 1 TABLET: 50; 325; 40 TABLET ORAL at 00:50

## 2018-10-28 RX ADMIN — Medication 2 PUFF: at 16:14

## 2018-10-28 RX ADMIN — Medication 2 PUFF: at 07:40

## 2018-10-28 RX ADMIN — ONDANSETRON 4 MG: 4 TABLET, ORALLY DISINTEGRATING ORAL at 00:51

## 2018-10-28 RX ADMIN — CLOPIDOGREL BISULFATE 75 MG: 75 TABLET, FILM COATED ORAL at 09:25

## 2018-10-28 RX ADMIN — HYDROXYZINE PAMOATE 50 MG: 50 CAPSULE ORAL at 21:01

## 2018-10-28 RX ADMIN — QUETIAPINE FUMARATE 500 MG: 300 TABLET, EXTENDED RELEASE ORAL at 21:01

## 2018-10-28 RX ADMIN — HYOSCYAMINE SULFATE 375 MCG: 0.38 TABLET, EXTENDED RELEASE ORAL at 09:23

## 2018-10-28 RX ADMIN — Medication 2 PUFF: at 21:42

## 2018-10-28 RX ADMIN — TRAZODONE HYDROCHLORIDE 200 MG: 100 TABLET ORAL at 21:01

## 2018-10-28 RX ADMIN — PANTOPRAZOLE SODIUM 40 MG: 40 TABLET, DELAYED RELEASE ORAL at 09:25

## 2018-10-28 RX ADMIN — ESCITALOPRAM 30 MG: 20 TABLET, FILM COATED ORAL at 09:23

## 2018-10-28 RX ADMIN — HYDROXYZINE PAMOATE 50 MG: 50 CAPSULE ORAL at 09:25

## 2018-10-28 RX ADMIN — BUTALBITAL, ACETAMINOPHEN, AND CAFFEINE 1 TABLET: 50; 325; 40 TABLET ORAL at 09:24

## 2018-10-28 RX ADMIN — LISINOPRIL 2.5 MG: 2.5 TABLET ORAL at 09:23

## 2018-10-28 RX ADMIN — BUTALBITAL, ACETAMINOPHEN, AND CAFFEINE 1 TABLET: 50; 325; 40 TABLET ORAL at 21:02

## 2018-10-28 RX ADMIN — LEVOTHYROXINE SODIUM 150 MCG: 150 TABLET ORAL at 05:48

## 2018-10-28 RX ADMIN — CARVEDILOL 3.12 MG: 3.12 TABLET, FILM COATED ORAL at 09:23

## 2018-10-28 RX ADMIN — FERROUS SULFATE TAB 325 MG (65 MG ELEMENTAL FE) 325 MG: 325 (65 FE) TAB at 09:23

## 2018-10-28 RX ADMIN — HYDROCHLOROTHIAZIDE 25 MG: 25 TABLET ORAL at 09:23

## 2018-10-28 RX ADMIN — Medication 2 PUFF: at 11:37

## 2018-10-28 RX ADMIN — ISOSORBIDE DINITRATE 10 MG: 10 TABLET ORAL at 09:23

## 2018-10-28 RX ADMIN — ISOSORBIDE DINITRATE 10 MG: 10 TABLET ORAL at 21:01

## 2018-10-28 RX ADMIN — BUTALBITAL, ACETAMINOPHEN, AND CAFFEINE 1 TABLET: 50; 325; 40 TABLET ORAL at 13:25

## 2018-10-28 RX ADMIN — Medication 2 PUFF: at 21:43

## 2018-10-28 ASSESSMENT — PAIN SCALES - GENERAL
PAINLEVEL_OUTOF10: 10
PAINLEVEL_OUTOF10: 10
PAINLEVEL_OUTOF10: 4
PAINLEVEL_OUTOF10: 4
PAINLEVEL_OUTOF10: 7
PAINLEVEL_OUTOF10: 9
PAINLEVEL_OUTOF10: 10
PAINLEVEL_OUTOF10: 5
PAINLEVEL_OUTOF10: 9

## 2018-10-28 ASSESSMENT — PAIN DESCRIPTION - LOCATION
LOCATION: HEAD
LOCATION: CHEST;HEAD
LOCATION: HEAD

## 2018-10-28 ASSESSMENT — PAIN DESCRIPTION - FREQUENCY
FREQUENCY: INTERMITTENT
FREQUENCY: INTERMITTENT

## 2018-10-28 ASSESSMENT — PAIN DESCRIPTION - PAIN TYPE
TYPE: CHRONIC PAIN

## 2018-10-28 ASSESSMENT — PAIN DESCRIPTION - DESCRIPTORS
DESCRIPTORS: ACHING;HEADACHE
DESCRIPTORS: ACHING;CONSTANT

## 2018-10-28 ASSESSMENT — PAIN DESCRIPTION - ORIENTATION: ORIENTATION: MID;POSTERIOR

## 2018-10-28 ASSESSMENT — PAIN DESCRIPTION - ONSET
ONSET: ON-GOING
ONSET: ON-GOING

## 2018-10-28 ASSESSMENT — PAIN DESCRIPTION - PROGRESSION
CLINICAL_PROGRESSION: NOT CHANGED
CLINICAL_PROGRESSION: NOT CHANGED

## 2018-10-28 ASSESSMENT — ACTIVITIES OF DAILY LIVING (ADL): EFFECT OF PAIN ON DAILY ACTIVITIES: MINIMAL

## 2018-10-29 PROCEDURE — 99232 SBSQ HOSP IP/OBS MODERATE 35: CPT | Performed by: PSYCHIATRY & NEUROLOGY

## 2018-10-29 PROCEDURE — 6370000000 HC RX 637 (ALT 250 FOR IP): Performed by: PHYSICIAN ASSISTANT

## 2018-10-29 PROCEDURE — 1240000000 HC EMOTIONAL WELLNESS R&B

## 2018-10-29 PROCEDURE — 6370000000 HC RX 637 (ALT 250 FOR IP): Performed by: PSYCHIATRY & NEUROLOGY

## 2018-10-29 PROCEDURE — 94640 AIRWAY INHALATION TREATMENT: CPT

## 2018-10-29 RX ORDER — ESCITALOPRAM OXALATE 20 MG/1
20 TABLET ORAL EVERY MORNING
Status: DISCONTINUED | OUTPATIENT
Start: 2018-10-30 | End: 2018-10-31 | Stop reason: HOSPADM

## 2018-10-29 RX ORDER — MIRTAZAPINE 15 MG/1
7.5 TABLET, FILM COATED ORAL NIGHTLY
Status: DISCONTINUED | OUTPATIENT
Start: 2018-10-29 | End: 2018-10-31 | Stop reason: HOSPADM

## 2018-10-29 RX ADMIN — QUETIAPINE FUMARATE 500 MG: 300 TABLET, EXTENDED RELEASE ORAL at 20:33

## 2018-10-29 RX ADMIN — HYOSCYAMINE SULFATE 375 MCG: 0.38 TABLET, EXTENDED RELEASE ORAL at 07:58

## 2018-10-29 RX ADMIN — CLOPIDOGREL BISULFATE 75 MG: 75 TABLET, FILM COATED ORAL at 07:59

## 2018-10-29 RX ADMIN — ESCITALOPRAM 30 MG: 20 TABLET, FILM COATED ORAL at 07:59

## 2018-10-29 RX ADMIN — LEVOTHYROXINE SODIUM 75 MCG: 75 TABLET ORAL at 08:00

## 2018-10-29 RX ADMIN — Medication 2 PUFF: at 18:16

## 2018-10-29 RX ADMIN — PANTOPRAZOLE SODIUM 40 MG: 40 TABLET, DELAYED RELEASE ORAL at 07:59

## 2018-10-29 RX ADMIN — FERROUS SULFATE TAB 325 MG (65 MG ELEMENTAL FE) 325 MG: 325 (65 FE) TAB at 08:00

## 2018-10-29 RX ADMIN — BUTALBITAL, ACETAMINOPHEN, AND CAFFEINE 1 TABLET: 50; 325; 40 TABLET ORAL at 13:40

## 2018-10-29 RX ADMIN — TRAZODONE HYDROCHLORIDE 200 MG: 100 TABLET ORAL at 20:33

## 2018-10-29 RX ADMIN — Medication 2 PUFF: at 22:26

## 2018-10-29 RX ADMIN — Medication 2 PUFF: at 22:28

## 2018-10-29 RX ADMIN — HYDROXYZINE PAMOATE 50 MG: 50 CAPSULE ORAL at 20:33

## 2018-10-29 RX ADMIN — ISOSORBIDE DINITRATE 10 MG: 10 TABLET ORAL at 20:34

## 2018-10-29 RX ADMIN — HYOSCYAMINE SULFATE 375 MCG: 0.38 TABLET, EXTENDED RELEASE ORAL at 20:33

## 2018-10-29 RX ADMIN — MIRTAZAPINE 7.5 MG: 15 TABLET, FILM COATED ORAL at 20:35

## 2018-10-29 RX ADMIN — BUTALBITAL, ACETAMINOPHEN, AND CAFFEINE 1 TABLET: 50; 325; 40 TABLET ORAL at 08:00

## 2018-10-29 RX ADMIN — ACETAMINOPHEN 650 MG: 325 TABLET ORAL at 20:34

## 2018-10-29 RX ADMIN — Medication 2 PUFF: at 14:12

## 2018-10-29 ASSESSMENT — PAIN SCALES - GENERAL
PAINLEVEL_OUTOF10: 5
PAINLEVEL_OUTOF10: 0
PAINLEVEL_OUTOF10: 10
PAINLEVEL_OUTOF10: 1
PAINLEVEL_OUTOF10: 3
PAINLEVEL_OUTOF10: 5
PAINLEVEL_OUTOF10: 3

## 2018-10-29 ASSESSMENT — PAIN DESCRIPTION - ONSET: ONSET: ON-GOING

## 2018-10-29 ASSESSMENT — PAIN DESCRIPTION - FREQUENCY
FREQUENCY: INTERMITTENT
FREQUENCY: INTERMITTENT

## 2018-10-29 ASSESSMENT — PAIN DESCRIPTION - PAIN TYPE
TYPE: CHRONIC PAIN
TYPE: CHRONIC PAIN

## 2018-10-29 ASSESSMENT — PAIN DESCRIPTION - ORIENTATION: ORIENTATION: RIGHT;UPPER

## 2018-10-29 ASSESSMENT — PAIN DESCRIPTION - DESCRIPTORS
DESCRIPTORS: ACHING;STABBING
DESCRIPTORS: ACHING

## 2018-10-29 ASSESSMENT — PAIN DESCRIPTION - LOCATION
LOCATION: HEAD
LOCATION: CHEST

## 2018-10-29 ASSESSMENT — PAIN DESCRIPTION - PROGRESSION: CLINICAL_PROGRESSION: NOT CHANGED

## 2018-10-29 NOTE — BH NOTE
Patient was sitting in the recreation group and was going to attempt participation but then left group due to nausea.

## 2018-10-29 NOTE — PROGRESS NOTES
24 hour chart review completed
24 hour chart review completed
BHI Biopsychosocial Assessment    Current Level of Psychosocial Functioning     Independent XXX  Dependent    Minimal Assist     Comments:      Psychosocial High Risk Factors (check all that apply)    Unable to obtain meds   Chronic illness/pain    Substance abuse XXX  Lack of Family Support   Financial stress   Isolation   Inadequate Community Resources   Suicide attempt(s)  Not taking medications   Victim of crime   Developmental Delay  Unable to manage personal needs    Age 74 or older   Homeless  No transportation   Readmission within 30 days  Unemployment  Traumatic Event    Comments:   Sexual Orientation:  Heterosexual    Patient Strengths: Connected To Outpatient Provider    Patient Barriers: Substance Abuse, Poor Coping Skills    Plan of Care   Medication management, group/individual therapies, family meetings, psycho -education, treatment team meetings to assist with stabilization    Initial Discharge Plan:  Patient is currently living here in [facility] with a friend. Patient plans to return to friend's home upon discharge. Patient is currently connected with [facility], for counseling, medication management, and case management. Clinical Summary: This is a 66year old, female who is admitted for self-injury and suicidal ideation. Patient is currently living in [facility] with a friend. Patient is currently connected with [facility], for counseling, medication management, and case management. Upon admission patient was positive amphetamines, barbiturates,cocaines and opiates. SW will continue to discharge plan.      REYES Delarosa
Encouraged patient to attend 1:00 group. Patient declined, stating she was not feeling well. 1:1 offered.     Jeff Duran LPC
Pt had large emesis. Emesis noted to have dark colored fluid resembling old blood. Pt said that she noted blood when wiping her Mouth after vomiting. Pt given Zofan per order. Dr Aidan Rojo notied and order received for hospitalist. Elias sent to DR Paulino Vazquez to notify him of consult. Pt weight taken . Weight today 155# .  Pt said that she was weighed on admit and was 160#
Pt. Refused to attend evening wellness group.
This RN has reviewed and agrees with MANUEL Baer's data collection and has collaborated with this LPN regarding the patient's care plan.
This RN has reviewed and agrees with Nick Abdalla LPN's data collection and has collaborated with this LPN regarding the patient's care plan.
Time Spent: 35 minutes    Electronically signed by Selena Clark MD on 10/29/18 at 9:46 AM
Was given tylenol in the ED without relief. Affect flat and sad. Complains of anxiety and depression. Pt is resting in her room at this time.            Cassandra Rudd RN

## 2018-10-30 PROCEDURE — 94640 AIRWAY INHALATION TREATMENT: CPT

## 2018-10-30 PROCEDURE — 6370000000 HC RX 637 (ALT 250 FOR IP): Performed by: PHYSICIAN ASSISTANT

## 2018-10-30 PROCEDURE — 6370000000 HC RX 637 (ALT 250 FOR IP): Performed by: PSYCHIATRY & NEUROLOGY

## 2018-10-30 PROCEDURE — 1240000000 HC EMOTIONAL WELLNESS R&B

## 2018-10-30 PROCEDURE — 99231 SBSQ HOSP IP/OBS SF/LOW 25: CPT | Performed by: PSYCHIATRY & NEUROLOGY

## 2018-10-30 RX ADMIN — Medication 2 PUFF: at 14:34

## 2018-10-30 RX ADMIN — HYOSCYAMINE SULFATE 375 MCG: 0.38 TABLET, EXTENDED RELEASE ORAL at 08:55

## 2018-10-30 RX ADMIN — TRAZODONE HYDROCHLORIDE 200 MG: 100 TABLET ORAL at 20:51

## 2018-10-30 RX ADMIN — Medication 2 PUFF: at 19:00

## 2018-10-30 RX ADMIN — QUETIAPINE FUMARATE 500 MG: 300 TABLET, EXTENDED RELEASE ORAL at 20:51

## 2018-10-30 RX ADMIN — HYDROXYZINE PAMOATE 50 MG: 50 CAPSULE ORAL at 08:44

## 2018-10-30 RX ADMIN — ISOSORBIDE DINITRATE 10 MG: 10 TABLET ORAL at 20:50

## 2018-10-30 RX ADMIN — HYDROXYZINE PAMOATE 50 MG: 50 CAPSULE ORAL at 17:48

## 2018-10-30 RX ADMIN — ISOSORBIDE DINITRATE 10 MG: 10 TABLET ORAL at 14:01

## 2018-10-30 RX ADMIN — FERROUS SULFATE TAB 325 MG (65 MG ELEMENTAL FE) 325 MG: 325 (65 FE) TAB at 08:44

## 2018-10-30 RX ADMIN — HYOSCYAMINE SULFATE 375 MCG: 0.38 TABLET, EXTENDED RELEASE ORAL at 20:51

## 2018-10-30 RX ADMIN — CLOPIDOGREL BISULFATE 75 MG: 75 TABLET, FILM COATED ORAL at 08:44

## 2018-10-30 RX ADMIN — ACETAMINOPHEN 650 MG: 325 TABLET ORAL at 20:51

## 2018-10-30 RX ADMIN — MIRTAZAPINE 7.5 MG: 15 TABLET, FILM COATED ORAL at 20:50

## 2018-10-30 RX ADMIN — LEVOTHYROXINE SODIUM 75 MCG: 75 TABLET ORAL at 08:45

## 2018-10-30 RX ADMIN — ESCITALOPRAM 20 MG: 20 TABLET, FILM COATED ORAL at 08:44

## 2018-10-30 RX ADMIN — ACETAMINOPHEN 650 MG: 325 TABLET ORAL at 08:44

## 2018-10-30 RX ADMIN — PANTOPRAZOLE SODIUM 40 MG: 40 TABLET, DELAYED RELEASE ORAL at 08:44

## 2018-10-30 RX ADMIN — BUTALBITAL, ACETAMINOPHEN, AND CAFFEINE 1 TABLET: 50; 325; 40 TABLET ORAL at 14:04

## 2018-10-30 ASSESSMENT — PAIN DESCRIPTION - PROGRESSION: CLINICAL_PROGRESSION: NOT CHANGED

## 2018-10-30 ASSESSMENT — PAIN SCALES - GENERAL
PAINLEVEL_OUTOF10: 8
PAINLEVEL_OUTOF10: 3
PAINLEVEL_OUTOF10: 3
PAINLEVEL_OUTOF10: 7
PAINLEVEL_OUTOF10: 0

## 2018-10-30 ASSESSMENT — PAIN DESCRIPTION - DESCRIPTORS: DESCRIPTORS: ACHING

## 2018-10-30 ASSESSMENT — PAIN DESCRIPTION - PAIN TYPE
TYPE: CHRONIC PAIN
TYPE: CHRONIC PAIN

## 2018-10-30 ASSESSMENT — PAIN DESCRIPTION - LOCATION
LOCATION: LEG
LOCATION: CHEST

## 2018-10-30 ASSESSMENT — PAIN DESCRIPTION - ORIENTATION: ORIENTATION: RIGHT;UPPER

## 2018-10-30 ASSESSMENT — PAIN DESCRIPTION - ONSET: ONSET: ON-GOING

## 2018-10-30 ASSESSMENT — PAIN DESCRIPTION - FREQUENCY: FREQUENCY: INTERMITTENT

## 2018-10-30 NOTE — PLAN OF CARE
Problem: Impaired respiratory status  Goal: Clear lung sounds  Outcome: Ongoing
Problem: Impaired respiratory status  Goal: Clear lung sounds  Outcome: Ongoing  Pt continues on MDI's to help clear breath sounds.
Problem: Social interaction  Goal: Increasing Social Interaction  Outcome: Not Met This Shift  Patient has not attended any groups this shift and has not been out for socialization with others so she has not met her socialization goal for today. Patient will be encouraged to attend all groups on the unit daily and to come out of her room for socialization with others.
Problem: Social interaction  Goal: Increasing Social Interaction  Outcome: Ongoing  Patient participated in the goal group today and has been out of her room this afternoon to watch TV with another patient. Patient did not attend any of the other groups that were offered. Patient will be encouraged to attend all groups on the unit daily during the rest of her hospital stay.
decrease  Pain level will decrease   Outcome: Ongoing  No pain voiced this shift    Problem: Nausea/Vomiting:  Goal: Absence of nausea/vomiting  Absence of nausea/vomiting   Outcome: Ongoing  States she has some nausea. Able to eat a snack. Zofran given for nausea    Problem: Activity:  Goal: Sleeping patterns will improve  Sleeping patterns will improve   Outcome: Ongoing  Trazodone given as ordered to help sleep. Sleeping when checked at 15 minute rounds during the night. Problem: Skin Integrity:  Goal: Will show no infection signs and symptoms  Will show no infection signs and symptoms. Outcome: Ongoing  Has some scabbed areas on skin. No increase in redness at areas  Goal: Absence of new skin breakdown  Absence of new skin breakdown   Outcome: Ongoing  No new skin breakdown noted    Problem: Nutrition  Goal: Optimal nutrition therapy  Outcome: Ongoing  Ate a snack. Food and fluids taken and tolerated well    Problem: Airway Clearance - Ineffective:  Goal: Ability to maintain a clear airway will improve  Ability to maintain a clear airway will improve   Outcome: Met This Shift  Airway remains clear. Respirations easy and regular    Problem: Cardiac Output - Decreased:  Goal: Hemodynamic stability will improve  Hemodynamic stability will improve   Outcome: Ongoing  Vital signs within normal limits. See flowsheet for vital signs    Problem: Bleeding:  Goal: Will show no signs and symptoms of excessive bleeding  Will show no signs and symptoms of excessive bleeding   Outcome: Ongoing  No signs of excessive bleeding reported or observed    Comments: Care plan reviewed with patient. Patient verbalizes understanding of the plan of care and contributes to goal setting.
understanding of the plan of care and does not contribute to goal setting

## 2018-10-31 VITALS
HEART RATE: 73 BPM | OXYGEN SATURATION: 96 % | HEIGHT: 64 IN | DIASTOLIC BLOOD PRESSURE: 57 MMHG | SYSTOLIC BLOOD PRESSURE: 118 MMHG | WEIGHT: 155 LBS | BODY MASS INDEX: 26.46 KG/M2 | TEMPERATURE: 97.2 F | RESPIRATION RATE: 18 BRPM

## 2018-10-31 PROCEDURE — 5130000000 HC BRIDGE APPOINTMENT

## 2018-10-31 PROCEDURE — 94640 AIRWAY INHALATION TREATMENT: CPT

## 2018-10-31 PROCEDURE — 99239 HOSP IP/OBS DSCHRG MGMT >30: CPT | Performed by: PSYCHIATRY & NEUROLOGY

## 2018-10-31 PROCEDURE — 94760 N-INVAS EAR/PLS OXIMETRY 1: CPT

## 2018-10-31 PROCEDURE — 6370000000 HC RX 637 (ALT 250 FOR IP): Performed by: PSYCHIATRY & NEUROLOGY

## 2018-10-31 PROCEDURE — 6370000000 HC RX 637 (ALT 250 FOR IP): Performed by: PHYSICIAN ASSISTANT

## 2018-10-31 RX ORDER — QUETIAPINE 300 MG/1
500 TABLET, FILM COATED, EXTENDED RELEASE ORAL NIGHTLY
Qty: 30 TABLET | Refills: 3 | Status: SHIPPED | OUTPATIENT
Start: 2018-10-31 | End: 2018-10-31

## 2018-10-31 RX ORDER — ESCITALOPRAM OXALATE 20 MG/1
20 TABLET ORAL EVERY MORNING
Qty: 30 TABLET | Refills: 0 | Status: SHIPPED | OUTPATIENT
Start: 2018-10-31 | End: 2018-10-31

## 2018-10-31 RX ORDER — HYDROXYZINE PAMOATE 50 MG/1
50 CAPSULE ORAL 3 TIMES DAILY PRN
Qty: 30 CAPSULE | Refills: 0 | Status: SHIPPED | OUTPATIENT
Start: 2018-10-31 | End: 2018-10-31

## 2018-10-31 RX ORDER — MIRTAZAPINE 7.5 MG/1
7.5 TABLET, FILM COATED ORAL NIGHTLY
Qty: 30 TABLET | Refills: 0 | Status: ON HOLD | OUTPATIENT
Start: 2018-10-31 | End: 2019-03-19 | Stop reason: HOSPADM

## 2018-10-31 RX ORDER — QUETIAPINE 300 MG/1
500 TABLET, FILM COATED, EXTENDED RELEASE ORAL NIGHTLY
Qty: 30 TABLET | Refills: 0 | Status: SHIPPED | OUTPATIENT
Start: 2018-10-31 | End: 2021-12-02 | Stop reason: DRUGHIGH

## 2018-10-31 RX ORDER — MIRTAZAPINE 7.5 MG/1
7.5 TABLET, FILM COATED ORAL NIGHTLY
Qty: 30 TABLET | Refills: 0 | Status: SHIPPED | OUTPATIENT
Start: 2018-10-31 | End: 2018-10-31

## 2018-10-31 RX ORDER — TRAZODONE HYDROCHLORIDE 100 MG/1
200 TABLET ORAL NIGHTLY
Qty: 30 TABLET | Refills: 0 | Status: SHIPPED | OUTPATIENT
Start: 2018-10-31

## 2018-10-31 RX ORDER — HYDROXYZINE PAMOATE 50 MG/1
50 CAPSULE ORAL 3 TIMES DAILY PRN
Qty: 30 CAPSULE | Refills: 0 | Status: SHIPPED | OUTPATIENT
Start: 2018-10-31 | End: 2018-11-10

## 2018-10-31 RX ORDER — ESCITALOPRAM OXALATE 20 MG/1
20 TABLET ORAL EVERY MORNING
Qty: 30 TABLET | Refills: 0 | Status: SHIPPED | OUTPATIENT
Start: 2018-10-31 | End: 2019-02-21

## 2018-10-31 RX ADMIN — Medication 2 PUFF: at 09:39

## 2018-10-31 RX ADMIN — BUTALBITAL, ACETAMINOPHEN, AND CAFFEINE 1 TABLET: 50; 325; 40 TABLET ORAL at 12:51

## 2018-10-31 RX ADMIN — LEVOTHYROXINE SODIUM 75 MCG: 75 TABLET ORAL at 09:30

## 2018-10-31 RX ADMIN — Medication 2 PUFF: at 13:37

## 2018-10-31 RX ADMIN — PANTOPRAZOLE SODIUM 40 MG: 40 TABLET, DELAYED RELEASE ORAL at 09:33

## 2018-10-31 RX ADMIN — HYOSCYAMINE SULFATE 375 MCG: 0.38 TABLET, EXTENDED RELEASE ORAL at 09:32

## 2018-10-31 RX ADMIN — CARVEDILOL 3.12 MG: 3.12 TABLET, FILM COATED ORAL at 09:15

## 2018-10-31 RX ADMIN — BUTALBITAL, ACETAMINOPHEN, AND CAFFEINE 1 TABLET: 50; 325; 40 TABLET ORAL at 04:43

## 2018-10-31 RX ADMIN — ESCITALOPRAM 20 MG: 20 TABLET, FILM COATED ORAL at 09:32

## 2018-10-31 RX ADMIN — CLOPIDOGREL BISULFATE 75 MG: 75 TABLET, FILM COATED ORAL at 09:31

## 2018-10-31 RX ADMIN — FERROUS SULFATE TAB 325 MG (65 MG ELEMENTAL FE) 325 MG: 325 (65 FE) TAB at 09:32

## 2018-10-31 RX ADMIN — Medication 2 PUFF: at 09:40

## 2018-10-31 ASSESSMENT — PAIN DESCRIPTION - ONSET: ONSET: ON-GOING

## 2018-10-31 ASSESSMENT — PAIN SCALES - GENERAL
PAINLEVEL_OUTOF10: 8
PAINLEVEL_OUTOF10: 0
PAINLEVEL_OUTOF10: 10

## 2018-10-31 ASSESSMENT — PAIN DESCRIPTION - LOCATION: LOCATION: HEAD

## 2018-10-31 ASSESSMENT — PAIN DESCRIPTION - FREQUENCY: FREQUENCY: CONTINUOUS

## 2018-10-31 ASSESSMENT — PAIN DESCRIPTION - PROGRESSION: CLINICAL_PROGRESSION: NOT CHANGED

## 2018-10-31 ASSESSMENT — PAIN DESCRIPTION - PAIN TYPE: TYPE: CHRONIC PAIN

## 2018-10-31 ASSESSMENT — PAIN DESCRIPTION - DESCRIPTORS: DESCRIPTORS: ACHING;DISCOMFORT;DULL

## 2018-10-31 NOTE — DISCHARGE SUMMARY
significantly. Depression lifted, thoughts to harm self ceased. Sleep improved, appetite was good. On morning rounds 10/31/2018, Rosalinda Samano  endorses feeling ready for discharge. Patient denies suicidal or homicidal ideations, denies hallucinations or delusions. Denies SE's from meds. It was decided that maximum benefit from hospital care had been achieved and patient can be discharged. Consults:   Hospitalist    Significant Diagnostic Studies: Routine labs and diagnostics    Treatments: Psychotropic medications, therapy with group, milieu, and 1:1 with nurses, social workers, PA-C/CNP, and Attending physician. Discharge Medications:  Current Discharge Medication List      START taking these medications    Details   mirtazapine (REMERON) 7.5 MG tablet Take 1 tablet by mouth nightly  Qty: 30 tablet, Refills: 0         CONTINUE these medications which have CHANGED    Details   traZODone (DESYREL) 100 MG tablet Take 2 tablets by mouth nightly  Qty: 30 tablet, Refills: 0      hydrOXYzine (VISTARIL) 50 MG capsule Take 1 capsule by mouth 3 times daily as needed for Anxiety  Qty: 30 capsule, Refills: 0      escitalopram (LEXAPRO) 20 MG tablet Take 1 tablet by mouth every morning  Qty: 30 tablet, Refills: 0      QUEtiapine (SEROQUEL XR) 300 MG extended release tablet Take 2 tablets by mouth nightly  Qty: 30 tablet, Refills: 0         CONTINUE these medications which have NOT CHANGED    Details   !! levothyroxine (SYNTHROID) 150 MCG tablet Take 150 mcg by mouth once a week Weekly on Sunday      alosetron (LOTRONEX) 0.5 MG tablet Take 0.5 mg by mouth 2 times daily      RaNITidine HCl (RANITIDINE 75 PO) Take 150 mg by mouth 2 times daily      ! !  Albuterol Sulfate (VENTOLIN HFA IN) Inhale 90 mg into the lungs 2 times daily      isosorbide dinitrate (ISORDIL) 10 MG tablet TAKE 1 TABLET BY MOUTH 3 TIMES DAILY  Qty: 270 tablet, Refills: 3    Comments: Maximum Refills Reached      carvedilol (COREG) 3.125 MG

## 2018-11-01 ENCOUNTER — NURSE TRIAGE (OUTPATIENT)
Dept: ADMINISTRATIVE | Age: 63
End: 2018-11-01

## 2018-11-28 ENCOUNTER — HOSPITAL ENCOUNTER (OUTPATIENT)
Dept: GENERAL RADIOLOGY | Age: 63
Discharge: HOME | End: 2018-11-28
Payer: COMMERCIAL

## 2018-11-28 DIAGNOSIS — R13.10 PROBLEMS WITH SWALLOWING AND MASTICATION: ICD-10-CM

## 2018-11-28 PROCEDURE — A4641 RADIOPHARM DX AGENT NOC: HCPCS | Performed by: INTERNAL MEDICINE

## 2018-11-28 PROCEDURE — 2500000003 HC RX 250 WO HCPCS: Performed by: INTERNAL MEDICINE

## 2018-11-28 PROCEDURE — 6360000004 HC RX CONTRAST MEDICATION: Performed by: INTERNAL MEDICINE

## 2018-11-28 PROCEDURE — 74220 X-RAY XM ESOPHAGUS 1CNTRST: CPT

## 2018-11-28 PROCEDURE — 6370000000 HC RX 637 (ALT 250 FOR IP): Performed by: INTERNAL MEDICINE

## 2018-11-28 RX ADMIN — BARIUM SULFATE 100 ML: 0.6 SUSPENSION ORAL at 08:18

## 2018-11-28 RX ADMIN — BARIUM SULFATE 140 ML: 980 POWDER, FOR SUSPENSION ORAL at 08:17

## 2018-11-28 RX ADMIN — ANTACID/ANTIFLATULENT 1 EACH: 380; 550; 10; 10 GRANULE, EFFERVESCENT ORAL at 08:18

## 2018-11-29 ENCOUNTER — OFFICE VISIT (OUTPATIENT)
Dept: CARDIOLOGY CLINIC | Age: 63
End: 2018-11-29
Payer: COMMERCIAL

## 2018-11-29 VITALS
BODY MASS INDEX: 27.72 KG/M2 | DIASTOLIC BLOOD PRESSURE: 85 MMHG | WEIGHT: 162.4 LBS | HEART RATE: 81 BPM | HEIGHT: 64 IN | SYSTOLIC BLOOD PRESSURE: 132 MMHG

## 2018-11-29 DIAGNOSIS — I25.10 CORONARY ARTERY DISEASE INVOLVING NATIVE CORONARY ARTERY OF NATIVE HEART WITHOUT ANGINA PECTORIS: ICD-10-CM

## 2018-11-29 DIAGNOSIS — E78.5 DYSLIPIDEMIA: Primary | ICD-10-CM

## 2018-11-29 DIAGNOSIS — I10 ESSENTIAL HYPERTENSION: ICD-10-CM

## 2018-11-29 DIAGNOSIS — I51.89 DIASTOLIC DYSFUNCTION: ICD-10-CM

## 2018-11-29 PROCEDURE — G8484 FLU IMMUNIZE NO ADMIN: HCPCS | Performed by: PHYSICIAN ASSISTANT

## 2018-11-29 PROCEDURE — G8427 DOCREV CUR MEDS BY ELIG CLIN: HCPCS | Performed by: PHYSICIAN ASSISTANT

## 2018-11-29 PROCEDURE — 99213 OFFICE O/P EST LOW 20 MIN: CPT | Performed by: PHYSICIAN ASSISTANT

## 2018-11-29 PROCEDURE — 3017F COLORECTAL CA SCREEN DOC REV: CPT | Performed by: PHYSICIAN ASSISTANT

## 2018-11-29 PROCEDURE — G8598 ASA/ANTIPLAT THER USED: HCPCS | Performed by: PHYSICIAN ASSISTANT

## 2018-11-29 PROCEDURE — G8417 CALC BMI ABV UP PARAM F/U: HCPCS | Performed by: PHYSICIAN ASSISTANT

## 2018-11-29 PROCEDURE — 4004F PT TOBACCO SCREEN RCVD TLK: CPT | Performed by: PHYSICIAN ASSISTANT

## 2018-11-29 PROCEDURE — 1111F DSCHRG MED/CURRENT MED MERGE: CPT | Performed by: PHYSICIAN ASSISTANT

## 2018-11-29 RX ORDER — HYDROXYZINE PAMOATE 50 MG/1
50 CAPSULE ORAL 3 TIMES DAILY PRN
COMMUNITY
End: 2021-02-20

## 2018-11-29 NOTE — PROGRESS NOTES
of mouth as needed      TUDORZA PRESSAIR 400 MCG/ACT AEPB inhaler 1 puff 2 times daily   0    lisinopril (PRINIVIL;ZESTRIL) 2.5 MG tablet Take 2.5 mg by mouth daily      ferrous sulfate 325 (65 FE) MG tablet Take 325 mg by mouth daily (with breakfast)       levothyroxine (SYNTHROID) 75 MCG tablet Take 75 mcg by mouth Six times weekly everyday except Sunday take 150 mcg.  pantoprazole (PROTONIX) 40 MG tablet Take 1 tablet by mouth daily Indications: Gastroesophageal Reflux Disease 10 tablet 0    albuterol (PROVENTIL HFA;VENTOLIN HFA) 108 (90 BASE) MCG/ACT inhaler Inhale 2 puffs into the lungs every 6 hours as needed for Wheezing        No current facility-administered medications for this visit.         Social History     Social History    Marital status:      Spouse name: N/A    Number of children: 3    Years of education: 12     Occupational History    on disability      [employer]     Social History Main Topics    Smoking status: Current Every Day Smoker     Packs/day: 0.50     Years: 30.00     Types: Cigarettes     Start date: 4/22/1977    Smokeless tobacco: Never Used    Alcohol use No      Comment: none for 2 years    Drug use: Yes     Frequency: 1.0 time per week     Types: Cocaine      Comment: 10/24/18    Sexual activity: No     Other Topics Concern    None     Social History Narrative    None       Family History   Problem Relation Age of Onset    Cancer Mother     Heart Disease Mother     High Blood Pressure Mother     High Cholesterol Mother     Cancer Father         brain    Depression Father     Heart Disease Father     High Cholesterol Father     Mental Illness Father     Cancer Sister     Heart Attack Sister     Heart Disease Maternal Uncle     High Cholesterol Maternal Uncle     Diabetes Maternal Grandmother     Heart Disease Maternal Grandmother     High Cholesterol Maternal Grandmother     Early Death Maternal Grandfather     Dr Tommy Guerra as scheduled or sooner if needed  (Please note that portions of this note were completed with a voice recognition program.  Efforts were made to edit the dictation but occasionally words are mis-transcribed.)      Jay Oquendo PA-C

## 2018-12-19 RX ORDER — HYDROCHLOROTHIAZIDE 25 MG/1
TABLET ORAL
Qty: 60 TABLET | Refills: 0 | Status: SHIPPED | OUTPATIENT
Start: 2018-12-19 | End: 2019-01-10 | Stop reason: SDUPTHER

## 2019-01-01 ENCOUNTER — APPOINTMENT (OUTPATIENT)
Dept: GENERAL RADIOLOGY | Age: 64
End: 2019-01-01
Payer: COMMERCIAL

## 2019-01-01 ENCOUNTER — HOSPITAL ENCOUNTER (EMERGENCY)
Age: 64
Discharge: HOME | End: 2019-01-01
Payer: COMMERCIAL

## 2019-01-01 VITALS
TEMPERATURE: 98.3 F | OXYGEN SATURATION: 97 % | SYSTOLIC BLOOD PRESSURE: 109 MMHG | RESPIRATION RATE: 24 BRPM | HEART RATE: 70 BPM | DIASTOLIC BLOOD PRESSURE: 63 MMHG

## 2019-01-01 DIAGNOSIS — S20.211A CONTUSION OF RIGHT CHEST WALL, INITIAL ENCOUNTER: ICD-10-CM

## 2019-01-01 DIAGNOSIS — S46.911A STRAIN OF RIGHT SHOULDER, INITIAL ENCOUNTER: ICD-10-CM

## 2019-01-01 DIAGNOSIS — S16.1XXA STRAIN OF NECK MUSCLE, INITIAL ENCOUNTER: ICD-10-CM
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
## 2019-01-01 LAB
ANION GAP SERPL CALCULATED.3IONS-SCNC: 13 MEQ/L (ref 8–16)
BASOPHILS # BLD: 0.4 %
BASOPHILS ABSOLUTE: 0 THOU/MM3 (ref 0–0.1)
BUN BLDV-MCNC: 12 MG/DL (ref 7–22)
CALCIUM SERPL-MCNC: 10.3 MG/DL (ref 8.5–10.5)
CHLORIDE BLD-SCNC: 98 MEQ/L (ref 98–111)
CO2: 26 MEQ/L (ref 23–33)
CREAT SERPL-MCNC: 0.6 MG/DL (ref 0.4–1.2)
EKG ATRIAL RATE: 81 BPM
EKG P AXIS: 53 DEGREES
EKG P-R INTERVAL: 146 MS
EKG Q-T INTERVAL: 406 MS
EKG QRS DURATION: 84 MS
EKG QTC CALCULATION (BAZETT): 471 MS
EKG R AXIS: 2 DEGREES
EKG T AXIS: 75 DEGREES
EKG VENTRICULAR RATE: 81 BPM
EOSINOPHIL # BLD: 0.9 %
EOSINOPHILS ABSOLUTE: 0.1 THOU/MM3 (ref 0–0.4)
ERYTHROCYTE [DISTWIDTH] IN BLOOD BY AUTOMATED COUNT: 14.2 % (ref 11.5–14.5)
ERYTHROCYTE [DISTWIDTH] IN BLOOD BY AUTOMATED COUNT: 43.2 FL (ref 35–45)
FLU A ANTIGEN: NEGATIVE
FLU B ANTIGEN: NEGATIVE
GFR SERPL CREATININE-BSD FRML MDRD: > 90 ML/MIN/1.73M2
GLUCOSE BLD-MCNC: 125 MG/DL (ref 70–108)
HCT VFR BLD CALC: 40.2 % (ref 37–47)
HEMOGLOBIN: 13.5 GM/DL (ref 12–16)
IMMATURE GRANS (ABS): 0.03 THOU/MM3 (ref 0–0.07)
IMMATURE GRANULOCYTES: 0.4 %
LYMPHOCYTES # BLD: 44.5 %
LYMPHOCYTES ABSOLUTE: 3 THOU/MM3 (ref 1–4.8)
MCH RBC QN AUTO: 28.2 PG (ref 26–33)
MCHC RBC AUTO-ENTMCNC: 33.6 GM/DL (ref 32.2–35.5)
MCV RBC AUTO: 83.9 FL (ref 81–99)
MONOCYTES # BLD: 7.2 %
MONOCYTES ABSOLUTE: 0.5 THOU/MM3 (ref 0.4–1.3)
NUCLEATED RED BLOOD CELLS: 0 /100 WBC
OSMOLALITY CALCULATION: 275.1 MOSMOL/KG (ref 275–300)
PLATELET # BLD: 250 THOU/MM3 (ref 130–400)
PMV BLD AUTO: 9.4 FL (ref 9.4–12.4)
POTASSIUM SERPL-SCNC: 3.8 MEQ/L (ref 3.5–5.2)
RBC # BLD: 4.79 MILL/MM3 (ref 4.2–5.4)
SEG NEUTROPHILS: 46.6 %
SEGMENTED NEUTROPHILS ABSOLUTE COUNT: 3.1 THOU/MM3 (ref 1.8–7.7)
SODIUM BLD-SCNC: 137 MEQ/L (ref 135–145)
TROPONIN T: < 0.01 NG/ML
WBC # BLD: 6.7 THOU/MM3 (ref 4.8–10.8)

## 2019-01-01 PROCEDURE — 72040 X-RAY EXAM NECK SPINE 2-3 VW: CPT

## 2019-01-01 PROCEDURE — 84484 ASSAY OF TROPONIN QUANT: CPT

## 2019-01-01 PROCEDURE — 73030 X-RAY EXAM OF SHOULDER: CPT

## 2019-01-01 PROCEDURE — 80048 BASIC METABOLIC PNL TOTAL CA: CPT

## 2019-01-01 PROCEDURE — 85025 COMPLETE CBC W/AUTO DIFF WBC: CPT

## 2019-01-01 PROCEDURE — 99284 EMERGENCY DEPT VISIT MOD MDM: CPT

## 2019-01-01 PROCEDURE — 36415 COLL VENOUS BLD VENIPUNCTURE: CPT

## 2019-01-01 PROCEDURE — 6370000000 HC RX 637 (ALT 250 FOR IP): Performed by: NURSE PRACTITIONER

## 2019-01-01 PROCEDURE — 93010 ELECTROCARDIOGRAM REPORT: CPT | Performed by: INTERNAL MEDICINE

## 2019-01-01 PROCEDURE — 93005 ELECTROCARDIOGRAM TRACING: CPT | Performed by: NURSE PRACTITIONER

## 2019-01-01 PROCEDURE — 71101 X-RAY EXAM UNILAT RIBS/CHEST: CPT

## 2019-01-01 PROCEDURE — 87804 INFLUENZA ASSAY W/OPTIC: CPT

## 2019-01-01 RX ORDER — NAPROXEN 500 MG/1
500 TABLET ORAL 2 TIMES DAILY WITH MEALS
Qty: 30 TABLET | Refills: 0 | Status: SHIPPED | OUTPATIENT
Start: 2019-01-01 | End: 2019-01-22

## 2019-01-01 RX ORDER — NAPROXEN 250 MG/1
250 TABLET ORAL ONCE
Status: COMPLETED | OUTPATIENT
Start: 2019-01-01 | End: 2019-01-01

## 2019-01-01 RX ORDER — HYDROCODONE BITARTRATE AND ACETAMINOPHEN 5; 325 MG/1; MG/1
1 TABLET ORAL ONCE
Status: COMPLETED | OUTPATIENT
Start: 2019-01-01 | End: 2019-01-01

## 2019-01-01 RX ADMIN — NAPROXEN 250 MG: 250 TABLET ORAL at 19:30

## 2019-01-01 RX ADMIN — HYDROCODONE BITARTRATE AND ACETAMINOPHEN 1 TABLET: 5; 325 TABLET ORAL at 17:22

## 2019-01-01 ASSESSMENT — ENCOUNTER SYMPTOMS
RHINORRHEA: 0
EYE REDNESS: 0
BACK PAIN: 1
COUGH: 1
VOICE CHANGE: 0
VOMITING: 0
PHOTOPHOBIA: 0
ABDOMINAL PAIN: 0
SINUS PRESSURE: 0
WHEEZING: 0
SHORTNESS OF BREATH: 1
CHEST TIGHTNESS: 0
NAUSEA: 0
DIARRHEA: 0
CONSTIPATION: 0
COLOR CHANGE: 0
ABDOMINAL DISTENTION: 0
SORE THROAT: 1
BLOOD IN STOOL: 0

## 2019-01-01 ASSESSMENT — PAIN DESCRIPTION - PROGRESSION: CLINICAL_PROGRESSION: GRADUALLY WORSENING

## 2019-01-01 ASSESSMENT — PAIN SCALES - GENERAL
PAINLEVEL_OUTOF10: 10
PAINLEVEL_OUTOF10: 8
PAINLEVEL_OUTOF10: 10
PAINLEVEL_OUTOF10: 9

## 2019-01-10 RX ORDER — HYDROCHLOROTHIAZIDE 25 MG/1
TABLET ORAL
Qty: 30 TABLET | Refills: 6 | Status: SHIPPED | OUTPATIENT
Start: 2019-01-10 | End: 2019-05-01 | Stop reason: SDUPTHER

## 2019-01-22 ENCOUNTER — HOSPITAL ENCOUNTER (EMERGENCY)
Age: 64
Discharge: HOME | End: 2019-01-22
Attending: EMERGENCY MEDICINE
Payer: COMMERCIAL

## 2019-01-22 ENCOUNTER — APPOINTMENT (OUTPATIENT)
Dept: GENERAL RADIOLOGY | Age: 64
End: 2019-01-22
Payer: COMMERCIAL

## 2019-01-22 ENCOUNTER — APPOINTMENT (OUTPATIENT)
Dept: CT IMAGING | Age: 64
End: 2019-01-22
Payer: COMMERCIAL

## 2019-01-22 VITALS
BODY MASS INDEX: 27.49 KG/M2 | SYSTOLIC BLOOD PRESSURE: 110 MMHG | OXYGEN SATURATION: 96 % | WEIGHT: 161 LBS | TEMPERATURE: 98.1 F | HEART RATE: 74 BPM | HEIGHT: 64 IN | RESPIRATION RATE: 22 BRPM | DIASTOLIC BLOOD PRESSURE: 71 MMHG

## 2019-01-22 DIAGNOSIS — J40 BRONCHITIS: Primary | ICD-10-CM

## 2019-01-22 DIAGNOSIS — R10.84 GENERALIZED ABDOMINAL PAIN: ICD-10-CM

## 2019-01-22 DIAGNOSIS — F14.10 NONDEPENDENT COCAINE ABUSE (HCC): ICD-10-CM

## 2019-01-22 LAB
ALBUMIN SERPL-MCNC: 4.4 G/DL (ref 3.5–5.1)
ALP BLD-CCNC: 83 U/L (ref 38–126)
ALT SERPL-CCNC: 19 U/L (ref 11–66)
AMPHETAMINE+METHAMPHETAMINE URINE SCREEN: POSITIVE
ANION GAP SERPL CALCULATED.3IONS-SCNC: 16 MEQ/L (ref 8–16)
AST SERPL-CCNC: 14 U/L (ref 5–40)
BACTERIA: ABNORMAL /HPF
BARBITURATE QUANTITATIVE URINE: NEGATIVE
BASOPHILS # BLD: 0.7 %
BASOPHILS ABSOLUTE: 0.1 THOU/MM3 (ref 0–0.1)
BENZODIAZEPINE QUANTITATIVE URINE: NEGATIVE
BILIRUB SERPL-MCNC: 0.2 MG/DL (ref 0.3–1.2)
BILIRUBIN URINE: NEGATIVE
BLOOD, URINE: ABNORMAL
BUN BLDV-MCNC: 12 MG/DL (ref 7–22)
CALCIUM SERPL-MCNC: 10.2 MG/DL (ref 8.5–10.5)
CANNABINOID QUANTITATIVE URINE: NEGATIVE
CASTS 2: ABNORMAL /LPF
CASTS UA: ABNORMAL /LPF
CHARACTER, URINE: CLEAR
CHLORIDE BLD-SCNC: 95 MEQ/L (ref 98–111)
CO2: 24 MEQ/L (ref 23–33)
COCAINE METABOLITE QUANTITATIVE URINE: POSITIVE
COLOR: YELLOW
CREAT SERPL-MCNC: 0.7 MG/DL (ref 0.4–1.2)
CRYSTALS, UA: ABNORMAL
EKG ATRIAL RATE: 82 BPM
EKG P AXIS: 55 DEGREES
EKG P-R INTERVAL: 144 MS
EKG Q-T INTERVAL: 422 MS
EKG QRS DURATION: 88 MS
EKG QTC CALCULATION (BAZETT): 493 MS
EKG R AXIS: -6 DEGREES
EKG T AXIS: 49 DEGREES
EKG VENTRICULAR RATE: 82 BPM
EOSINOPHIL # BLD: 1.9 %
EOSINOPHILS ABSOLUTE: 0.2 THOU/MM3 (ref 0–0.4)
EPITHELIAL CELLS, UA: ABNORMAL /HPF
ERYTHROCYTE [DISTWIDTH] IN BLOOD BY AUTOMATED COUNT: 12.9 % (ref 11.5–14.5)
ERYTHROCYTE [DISTWIDTH] IN BLOOD BY AUTOMATED COUNT: 37.3 FL (ref 35–45)
ETHYL ALCOHOL, SERUM: < 0.01 %
GFR SERPL CREATININE-BSD FRML MDRD: 85 ML/MIN/1.73M2
GLUCOSE BLD-MCNC: 125 MG/DL (ref 70–108)
GLUCOSE URINE: NEGATIVE MG/DL
HCT VFR BLD CALC: 44.1 % (ref 37–47)
HEMOGLOBIN: 15.1 GM/DL (ref 12–16)
IMMATURE GRANS (ABS): 0.04 THOU/MM3 (ref 0–0.07)
IMMATURE GRANULOCYTES: 0.5 %
KETONES, URINE: NEGATIVE
LEUKOCYTE ESTERASE, URINE: ABNORMAL
LYMPHOCYTES # BLD: 42.2 %
LYMPHOCYTES ABSOLUTE: 3.5 THOU/MM3 (ref 1–4.8)
MAGNESIUM: 1.5 MG/DL (ref 1.6–2.4)
MCH RBC QN AUTO: 27.7 PG (ref 26–33)
MCHC RBC AUTO-ENTMCNC: 34.2 GM/DL (ref 32.2–35.5)
MCV RBC AUTO: 80.9 FL (ref 81–99)
MISCELLANEOUS 2: ABNORMAL
MONOCYTES # BLD: 7.2 %
MONOCYTES ABSOLUTE: 0.6 THOU/MM3 (ref 0.4–1.3)
NITRITE, URINE: NEGATIVE
NUCLEATED RED BLOOD CELLS: 0 /100 WBC
OPIATES, URINE: NEGATIVE
OSMOLALITY CALCULATION: 271.3 MOSMOL/KG (ref 275–300)
OXYCODONE: NEGATIVE
PH UA: 5.5
PHENCYCLIDINE QUANTITATIVE URINE: NEGATIVE
PLATELET # BLD: 236 THOU/MM3 (ref 130–400)
PMV BLD AUTO: 8.8 FL (ref 9.4–12.4)
POTASSIUM REFLEX MAGNESIUM: 3.4 MEQ/L (ref 3.5–5.2)
PROTEIN UA: NEGATIVE
RBC # BLD: 5.45 MILL/MM3 (ref 4.2–5.4)
RBC URINE: ABNORMAL /HPF
RENAL EPITHELIAL, UA: ABNORMAL
SEG NEUTROPHILS: 47.5 %
SEGMENTED NEUTROPHILS ABSOLUTE COUNT: 3.9 THOU/MM3 (ref 1.8–7.7)
SODIUM BLD-SCNC: 135 MEQ/L (ref 135–145)
SPECIFIC GRAVITY, URINE: 1.01 (ref 1–1.03)
TOTAL PROTEIN: 7.1 G/DL (ref 6.1–8)
TROPONIN T: < 0.01 NG/ML
UROBILINOGEN, URINE: 0.2 EU/DL
WBC # BLD: 8.3 THOU/MM3 (ref 4.8–10.8)
WBC UA: ABNORMAL /HPF
YEAST: ABNORMAL

## 2019-01-22 PROCEDURE — 94640 AIRWAY INHALATION TREATMENT: CPT

## 2019-01-22 PROCEDURE — 93005 ELECTROCARDIOGRAM TRACING: CPT | Performed by: EMERGENCY MEDICINE

## 2019-01-22 PROCEDURE — 36415 COLL VENOUS BLD VENIPUNCTURE: CPT

## 2019-01-22 PROCEDURE — 87086 URINE CULTURE/COLONY COUNT: CPT

## 2019-01-22 PROCEDURE — G0480 DRUG TEST DEF 1-7 CLASSES: HCPCS

## 2019-01-22 PROCEDURE — 71046 X-RAY EXAM CHEST 2 VIEWS: CPT

## 2019-01-22 PROCEDURE — 96372 THER/PROPH/DIAG INJ SC/IM: CPT

## 2019-01-22 PROCEDURE — 80053 COMPREHEN METABOLIC PANEL: CPT

## 2019-01-22 PROCEDURE — 6370000000 HC RX 637 (ALT 250 FOR IP): Performed by: EMERGENCY MEDICINE

## 2019-01-22 PROCEDURE — 6370000000 HC RX 637 (ALT 250 FOR IP): Performed by: FAMILY MEDICINE

## 2019-01-22 PROCEDURE — 83735 ASSAY OF MAGNESIUM: CPT

## 2019-01-22 PROCEDURE — 6360000002 HC RX W HCPCS: Performed by: EMERGENCY MEDICINE

## 2019-01-22 PROCEDURE — 99284 EMERGENCY DEPT VISIT MOD MDM: CPT

## 2019-01-22 PROCEDURE — 84484 ASSAY OF TROPONIN QUANT: CPT

## 2019-01-22 PROCEDURE — 80307 DRUG TEST PRSMV CHEM ANLYZR: CPT

## 2019-01-22 PROCEDURE — 81001 URINALYSIS AUTO W/SCOPE: CPT

## 2019-01-22 PROCEDURE — 6360000002 HC RX W HCPCS: Performed by: FAMILY MEDICINE

## 2019-01-22 PROCEDURE — 85025 COMPLETE CBC W/AUTO DIFF WBC: CPT

## 2019-01-22 PROCEDURE — 93010 ELECTROCARDIOGRAM REPORT: CPT | Performed by: INTERNAL MEDICINE

## 2019-01-22 PROCEDURE — 74176 CT ABD & PELVIS W/O CONTRAST: CPT

## 2019-01-22 RX ORDER — ONDANSETRON 4 MG/1
4 TABLET, ORALLY DISINTEGRATING ORAL ONCE
Status: COMPLETED | OUTPATIENT
Start: 2019-01-22 | End: 2019-01-22

## 2019-01-22 RX ORDER — ORPHENADRINE CITRATE 30 MG/ML
60 INJECTION INTRAMUSCULAR; INTRAVENOUS ONCE
Status: COMPLETED | OUTPATIENT
Start: 2019-01-22 | End: 2019-01-22

## 2019-01-22 RX ORDER — ALBUTEROL SULFATE 90 UG/1
2 AEROSOL, METERED RESPIRATORY (INHALATION) EVERY 6 HOURS PRN
Status: DISCONTINUED | OUTPATIENT
Start: 2019-01-22 | End: 2019-01-22 | Stop reason: HOSPADM

## 2019-01-22 RX ORDER — HYDROXYZINE HYDROCHLORIDE 50 MG/ML
75 INJECTION, SOLUTION INTRAMUSCULAR ONCE
Status: COMPLETED | OUTPATIENT
Start: 2019-01-22 | End: 2019-01-22

## 2019-01-22 RX ORDER — PANTOPRAZOLE SODIUM 40 MG/1
40 TABLET, DELAYED RELEASE ORAL ONCE
Status: COMPLETED | OUTPATIENT
Start: 2019-01-22 | End: 2019-01-22

## 2019-01-22 RX ORDER — NAPROXEN 500 MG/1
500 TABLET ORAL 2 TIMES DAILY WITH MEALS
Qty: 30 TABLET | Refills: 0 | Status: SHIPPED | OUTPATIENT
Start: 2019-01-22 | End: 2019-04-01

## 2019-01-22 RX ORDER — IPRATROPIUM BROMIDE AND ALBUTEROL SULFATE 2.5; .5 MG/3ML; MG/3ML
1 SOLUTION RESPIRATORY (INHALATION) ONCE
Status: COMPLETED | OUTPATIENT
Start: 2019-01-22 | End: 2019-01-22

## 2019-01-22 RX ORDER — SUCRALFATE 1 G/1
1 TABLET ORAL ONCE
Status: COMPLETED | OUTPATIENT
Start: 2019-01-22 | End: 2019-01-22

## 2019-01-22 RX ORDER — CIPROFLOXACIN 500 MG/1
500 TABLET, FILM COATED ORAL 2 TIMES DAILY
Qty: 14 TABLET | Refills: 0 | Status: SHIPPED | OUTPATIENT
Start: 2019-01-22 | End: 2019-01-29

## 2019-01-22 RX ADMIN — PANTOPRAZOLE SODIUM 40 MG: 40 TABLET, DELAYED RELEASE ORAL at 16:26

## 2019-01-22 RX ADMIN — ORPHENADRINE CITRATE 60 MG: 30 INJECTION INTRAMUSCULAR; INTRAVENOUS at 19:00

## 2019-01-22 RX ADMIN — SUCRALFATE 1 G: 1 TABLET ORAL at 16:26

## 2019-01-22 RX ADMIN — ONDANSETRON 4 MG: 4 TABLET, ORALLY DISINTEGRATING ORAL at 16:26

## 2019-01-22 RX ADMIN — MAGNESIUM GLUCONATE 500 MG ORAL TABLET 400 MG: 500 TABLET ORAL at 18:59

## 2019-01-22 RX ADMIN — HYDROXYZINE HYDROCHLORIDE 75 MG: 50 INJECTION, SOLUTION INTRAMUSCULAR at 17:22

## 2019-01-22 RX ADMIN — IPRATROPIUM BROMIDE AND ALBUTEROL SULFATE 1 AMPULE: .5; 3 SOLUTION RESPIRATORY (INHALATION) at 17:05

## 2019-01-22 ASSESSMENT — ENCOUNTER SYMPTOMS
RHINORRHEA: 0
PHOTOPHOBIA: 0
NAUSEA: 1
SHORTNESS OF BREATH: 1
COUGH: 0
EYE ITCHING: 0
TROUBLE SWALLOWING: 0
EYE DISCHARGE: 0
VOMITING: 1
CONSTIPATION: 0
EYE REDNESS: 0
ABDOMINAL PAIN: 0
CHEST TIGHTNESS: 0
CHOKING: 0
BACK PAIN: 1
VOICE CHANGE: 0
SINUS PRESSURE: 0
DIARRHEA: 0
SORE THROAT: 0
BLOOD IN STOOL: 0
ABDOMINAL DISTENTION: 0
WHEEZING: 0
EYE PAIN: 0

## 2019-01-23 LAB
ORGANISM: ABNORMAL
URINE CULTURE REFLEX: ABNORMAL

## 2019-02-11 ENCOUNTER — APPOINTMENT (OUTPATIENT)
Dept: CT IMAGING | Age: 64
End: 2019-02-11
Payer: COMMERCIAL

## 2019-02-11 ENCOUNTER — APPOINTMENT (OUTPATIENT)
Dept: GENERAL RADIOLOGY | Age: 64
End: 2019-02-11
Payer: COMMERCIAL

## 2019-02-11 ENCOUNTER — HOSPITAL ENCOUNTER (EMERGENCY)
Age: 64
Discharge: HOME | End: 2019-02-11
Attending: EMERGENCY MEDICINE
Payer: COMMERCIAL

## 2019-02-11 VITALS
SYSTOLIC BLOOD PRESSURE: 124 MMHG | HEIGHT: 64 IN | RESPIRATION RATE: 22 BRPM | BODY MASS INDEX: 28.51 KG/M2 | DIASTOLIC BLOOD PRESSURE: 79 MMHG | HEART RATE: 70 BPM | TEMPERATURE: 98.2 F | OXYGEN SATURATION: 95 % | WEIGHT: 167 LBS

## 2019-02-11 DIAGNOSIS — J01.10 ACUTE FRONTAL SINUSITIS, RECURRENCE NOT SPECIFIED: ICD-10-CM

## 2019-02-11 DIAGNOSIS — J20.9 ACUTE BRONCHITIS, UNSPECIFIED ORGANISM: Primary | ICD-10-CM

## 2019-02-11 LAB
ALBUMIN SERPL-MCNC: 4.1 G/DL (ref 3.5–5.1)
ALP BLD-CCNC: 70 U/L (ref 38–126)
ALT SERPL-CCNC: 22 U/L (ref 11–66)
ANION GAP SERPL CALCULATED.3IONS-SCNC: 19 MEQ/L (ref 8–16)
AST SERPL-CCNC: 18 U/L (ref 5–40)
BASOPHILS # BLD: 0.8 %
BASOPHILS ABSOLUTE: 0 THOU/MM3 (ref 0–0.1)
BILIRUB SERPL-MCNC: 0.4 MG/DL (ref 0.3–1.2)
BILIRUBIN DIRECT: < 0.2 MG/DL (ref 0–0.3)
BUN BLDV-MCNC: 6 MG/DL (ref 7–22)
CALCIUM SERPL-MCNC: 10.3 MG/DL (ref 8.5–10.5)
CHLORIDE BLD-SCNC: 99 MEQ/L (ref 98–111)
CO2: 22 MEQ/L (ref 23–33)
CREAT SERPL-MCNC: 0.7 MG/DL (ref 0.4–1.2)
EKG ATRIAL RATE: 77 BPM
EKG P AXIS: 52 DEGREES
EKG P-R INTERVAL: 144 MS
EKG Q-T INTERVAL: 410 MS
EKG QRS DURATION: 84 MS
EKG QTC CALCULATION (BAZETT): 463 MS
EKG R AXIS: 4 DEGREES
EKG T AXIS: 82 DEGREES
EKG VENTRICULAR RATE: 77 BPM
EOSINOPHIL # BLD: 1 %
EOSINOPHILS ABSOLUTE: 0.1 THOU/MM3 (ref 0–0.4)
ERYTHROCYTE [DISTWIDTH] IN BLOOD BY AUTOMATED COUNT: 12.6 % (ref 11.5–14.5)
ERYTHROCYTE [DISTWIDTH] IN BLOOD BY AUTOMATED COUNT: 37.5 FL (ref 35–45)
GFR SERPL CREATININE-BSD FRML MDRD: 85 ML/MIN/1.73M2
GLUCOSE BLD-MCNC: 131 MG/DL (ref 70–108)
HCT VFR BLD CALC: 40 % (ref 37–47)
HEMOGLOBIN: 12.9 GM/DL (ref 12–16)
IMMATURE GRANS (ABS): 0.01 THOU/MM3 (ref 0–0.07)
IMMATURE GRANULOCYTES: 0.2 %
LIPASE: 17.3 U/L (ref 5.6–51.3)
LYMPHOCYTES # BLD: 38.1 %
LYMPHOCYTES ABSOLUTE: 2.4 THOU/MM3 (ref 1–4.8)
MAGNESIUM: 1.3 MG/DL (ref 1.6–2.4)
MCH RBC QN AUTO: 26.4 PG (ref 26–33)
MCHC RBC AUTO-ENTMCNC: 32.3 GM/DL (ref 32.2–35.5)
MCV RBC AUTO: 82 FL (ref 81–99)
MONOCYTES # BLD: 5.6 %
MONOCYTES ABSOLUTE: 0.3 THOU/MM3 (ref 0.4–1.3)
NUCLEATED RED BLOOD CELLS: 0 /100 WBC
OSMOLALITY CALCULATION: 278.8 MOSMOL/KG (ref 275–300)
PLATELET # BLD: 262 THOU/MM3 (ref 130–400)
PMV BLD AUTO: 8.6 FL (ref 9.4–12.4)
POTASSIUM SERPL-SCNC: 4 MEQ/L (ref 3.5–5.2)
PRO-BNP: 37.7 PG/ML (ref 0–900)
RBC # BLD: 4.88 MILL/MM3 (ref 4.2–5.4)
SEG NEUTROPHILS: 54.3 %
SEGMENTED NEUTROPHILS ABSOLUTE COUNT: 3.4 THOU/MM3 (ref 1.8–7.7)
SODIUM BLD-SCNC: 140 MEQ/L (ref 135–145)
TOTAL PROTEIN: 6.8 G/DL (ref 6.1–8)
TROPONIN T: < 0.01 NG/ML
WBC # BLD: 6.2 THOU/MM3 (ref 4.8–10.8)

## 2019-02-11 PROCEDURE — 84484 ASSAY OF TROPONIN QUANT: CPT

## 2019-02-11 PROCEDURE — 82248 BILIRUBIN DIRECT: CPT

## 2019-02-11 PROCEDURE — 36415 COLL VENOUS BLD VENIPUNCTURE: CPT

## 2019-02-11 PROCEDURE — 71046 X-RAY EXAM CHEST 2 VIEWS: CPT

## 2019-02-11 PROCEDURE — 99284 EMERGENCY DEPT VISIT MOD MDM: CPT

## 2019-02-11 PROCEDURE — 80053 COMPREHEN METABOLIC PANEL: CPT

## 2019-02-11 PROCEDURE — 6370000000 HC RX 637 (ALT 250 FOR IP): Performed by: EMERGENCY MEDICINE

## 2019-02-11 PROCEDURE — 83880 ASSAY OF NATRIURETIC PEPTIDE: CPT

## 2019-02-11 PROCEDURE — 85025 COMPLETE CBC W/AUTO DIFF WBC: CPT

## 2019-02-11 PROCEDURE — 83690 ASSAY OF LIPASE: CPT

## 2019-02-11 PROCEDURE — 93005 ELECTROCARDIOGRAM TRACING: CPT | Performed by: EMERGENCY MEDICINE

## 2019-02-11 PROCEDURE — 83735 ASSAY OF MAGNESIUM: CPT

## 2019-02-11 PROCEDURE — 70450 CT HEAD/BRAIN W/O DYE: CPT

## 2019-02-11 RX ORDER — LEVOFLOXACIN 500 MG/1
500 TABLET, FILM COATED ORAL DAILY
Qty: 10 TABLET | Refills: 0 | Status: SHIPPED | OUTPATIENT
Start: 2019-02-11 | End: 2019-02-21

## 2019-02-11 RX ORDER — CETIRIZINE HYDROCHLORIDE 10 MG/1
10 TABLET ORAL DAILY
Qty: 30 TABLET | Refills: 0 | Status: ON HOLD | OUTPATIENT
Start: 2019-02-11 | End: 2019-03-19 | Stop reason: HOSPADM

## 2019-02-11 RX ORDER — ALBUTEROL SULFATE 90 UG/1
2 AEROSOL, METERED RESPIRATORY (INHALATION) EVERY 6 HOURS PRN
Status: DISCONTINUED | OUTPATIENT
Start: 2019-02-11 | End: 2019-02-11 | Stop reason: HOSPADM

## 2019-02-11 RX ORDER — FLUTICASONE PROPIONATE 50 MCG
1 SPRAY, SUSPENSION (ML) NASAL DAILY
Qty: 1 BOTTLE | Refills: 0 | Status: ON HOLD | OUTPATIENT
Start: 2019-02-11 | End: 2019-05-14 | Stop reason: ALTCHOICE

## 2019-02-11 RX ORDER — PSEUDOEPHEDRINE HYDROCHLORIDE 30 MG/1
30 TABLET ORAL ONCE
Status: COMPLETED | OUTPATIENT
Start: 2019-02-11 | End: 2019-02-11

## 2019-02-11 RX ADMIN — PSEUDOEPHEDRINE HCL 30 MG: 30 TABLET, FILM COATED ORAL at 14:22

## 2019-02-11 ASSESSMENT — ENCOUNTER SYMPTOMS
EYE ITCHING: 0
VOMITING: 0
ABDOMINAL DISTENTION: 0
NAUSEA: 0
WHEEZING: 0
PHOTOPHOBIA: 0
BLOOD IN STOOL: 0
CONSTIPATION: 0
EYE PAIN: 0
TROUBLE SWALLOWING: 0
VOICE CHANGE: 0
SHORTNESS OF BREATH: 0
RHINORRHEA: 0
CHEST TIGHTNESS: 0
DIARRHEA: 0
ABDOMINAL PAIN: 0
EYE DISCHARGE: 0
CHOKING: 0
COUGH: 1
BACK PAIN: 0
SORE THROAT: 0
EYE REDNESS: 0
SINUS PRESSURE: 0

## 2019-02-11 ASSESSMENT — PAIN DESCRIPTION - LOCATION: LOCATION: CHEST;HEAD

## 2019-02-11 ASSESSMENT — PAIN DESCRIPTION - DESCRIPTORS: DESCRIPTORS: ACHING

## 2019-02-11 ASSESSMENT — PAIN DESCRIPTION - FREQUENCY: FREQUENCY: CONTINUOUS

## 2019-02-11 ASSESSMENT — PAIN DESCRIPTION - PAIN TYPE: TYPE: ACUTE PAIN

## 2019-02-12 PROCEDURE — 93010 ELECTROCARDIOGRAM REPORT: CPT | Performed by: INTERNAL MEDICINE

## 2019-02-21 ENCOUNTER — HOSPITAL ENCOUNTER (EMERGENCY)
Age: 64
Discharge: HOME | End: 2019-02-21
Attending: FAMILY MEDICINE
Payer: COMMERCIAL

## 2019-02-21 ENCOUNTER — APPOINTMENT (OUTPATIENT)
Dept: CT IMAGING | Age: 64
End: 2019-02-21
Payer: COMMERCIAL

## 2019-02-21 ENCOUNTER — APPOINTMENT (OUTPATIENT)
Dept: GENERAL RADIOLOGY | Age: 64
End: 2019-02-21
Payer: COMMERCIAL

## 2019-02-21 VITALS
RESPIRATION RATE: 25 BRPM | WEIGHT: 176 LBS | OXYGEN SATURATION: 95 % | SYSTOLIC BLOOD PRESSURE: 108 MMHG | DIASTOLIC BLOOD PRESSURE: 66 MMHG | HEIGHT: 64 IN | TEMPERATURE: 98.2 F | HEART RATE: 69 BPM | BODY MASS INDEX: 30.05 KG/M2

## 2019-02-21 DIAGNOSIS — L30.4 INTERTRIGO: ICD-10-CM

## 2019-02-21 DIAGNOSIS — S00.03XA CONTUSION OF SCALP, INITIAL ENCOUNTER: Primary | ICD-10-CM

## 2019-02-21 DIAGNOSIS — S80.00XA CONTUSION OF KNEE, UNSPECIFIED LATERALITY, INITIAL ENCOUNTER: ICD-10-CM

## 2019-02-21 DIAGNOSIS — M54.50 LOW BACK PAIN AT MULTIPLE SITES: ICD-10-CM

## 2019-02-21 DIAGNOSIS — N39.0 URINARY TRACT INFECTION WITHOUT HEMATURIA, SITE UNSPECIFIED: ICD-10-CM

## 2019-02-21 LAB
ALBUMIN SERPL-MCNC: 4.2 G/DL (ref 3.5–5.1)
ALP BLD-CCNC: 78 U/L (ref 38–126)
ALT SERPL-CCNC: 15 U/L (ref 11–66)
AMPHETAMINE+METHAMPHETAMINE URINE SCREEN: NEGATIVE
ANION GAP SERPL CALCULATED.3IONS-SCNC: 15 MEQ/L (ref 8–16)
APTT: 37.3 SECONDS (ref 22–38)
AST SERPL-CCNC: 14 U/L (ref 5–40)
BACTERIA: ABNORMAL /HPF
BARBITURATE QUANTITATIVE URINE: NEGATIVE
BASOPHILS # BLD: 0.5 %
BASOPHILS ABSOLUTE: 0 THOU/MM3 (ref 0–0.1)
BENZODIAZEPINE QUANTITATIVE URINE: NEGATIVE
BILIRUB SERPL-MCNC: 0.2 MG/DL (ref 0.3–1.2)
BILIRUBIN URINE: NEGATIVE
BLOOD, URINE: ABNORMAL
BUN BLDV-MCNC: 12 MG/DL (ref 7–22)
CALCIUM SERPL-MCNC: 10.3 MG/DL (ref 8.5–10.5)
CANNABINOID QUANTITATIVE URINE: NEGATIVE
CASTS 2: ABNORMAL /LPF
CASTS UA: ABNORMAL /LPF
CHARACTER, URINE: CLEAR
CHLORIDE BLD-SCNC: 90 MEQ/L (ref 98–111)
CO2: 26 MEQ/L (ref 23–33)
COCAINE METABOLITE QUANTITATIVE URINE: NEGATIVE
COLOR: YELLOW
CREAT SERPL-MCNC: 0.6 MG/DL (ref 0.4–1.2)
CRYSTALS, UA: ABNORMAL
EOSINOPHIL # BLD: 0.9 %
EOSINOPHILS ABSOLUTE: 0.1 THOU/MM3 (ref 0–0.4)
EPITHELIAL CELLS, UA: ABNORMAL /HPF
ERYTHROCYTE [DISTWIDTH] IN BLOOD BY AUTOMATED COUNT: 12.4 % (ref 11.5–14.5)
ERYTHROCYTE [DISTWIDTH] IN BLOOD BY AUTOMATED COUNT: 35.9 FL (ref 35–45)
GFR SERPL CREATININE-BSD FRML MDRD: > 90 ML/MIN/1.73M2
GLUCOSE BLD-MCNC: 115 MG/DL (ref 70–108)
GLUCOSE URINE: NEGATIVE MG/DL
HCT VFR BLD CALC: 41.8 % (ref 37–47)
HEMOGLOBIN: 13.8 GM/DL (ref 12–16)
IMMATURE GRANS (ABS): 0.04 THOU/MM3 (ref 0–0.07)
IMMATURE GRANULOCYTES: 0.4 %
INR BLD: 0.99 (ref 0.85–1.13)
KETONES, URINE: NEGATIVE
LEUKOCYTE ESTERASE, URINE: ABNORMAL
LYMPHOCYTES # BLD: 42.3 %
LYMPHOCYTES ABSOLUTE: 3.8 THOU/MM3 (ref 1–4.8)
MCH RBC QN AUTO: 26.8 PG (ref 26–33)
MCHC RBC AUTO-ENTMCNC: 33 GM/DL (ref 32.2–35.5)
MCV RBC AUTO: 81.2 FL (ref 81–99)
MISCELLANEOUS 2: ABNORMAL
MONOCYTES # BLD: 6.7 %
MONOCYTES ABSOLUTE: 0.6 THOU/MM3 (ref 0.4–1.3)
NITRITE, URINE: NEGATIVE
NUCLEATED RED BLOOD CELLS: 0 /100 WBC
OPIATES, URINE: NEGATIVE
OSMOLALITY CALCULATION: 263.3 MOSMOL/KG (ref 275–300)
OXYCODONE: NEGATIVE
PH UA: 6
PHENCYCLIDINE QUANTITATIVE URINE: NEGATIVE
PLATELET # BLD: 258 THOU/MM3 (ref 130–400)
PMV BLD AUTO: 8.7 FL (ref 9.4–12.4)
POTASSIUM REFLEX MAGNESIUM: 3.9 MEQ/L (ref 3.5–5.2)
PROTEIN UA: NEGATIVE
RBC # BLD: 5.15 MILL/MM3 (ref 4.2–5.4)
RBC URINE: ABNORMAL /HPF
RENAL EPITHELIAL, UA: ABNORMAL
SEG NEUTROPHILS: 49.2 %
SEGMENTED NEUTROPHILS ABSOLUTE COUNT: 4.5 THOU/MM3 (ref 1.8–7.7)
SODIUM BLD-SCNC: 131 MEQ/L (ref 135–145)
SPECIFIC GRAVITY, URINE: 1.01 (ref 1–1.03)
TOTAL PROTEIN: 6.8 G/DL (ref 6.1–8)
UROBILINOGEN, URINE: 0.2 EU/DL
WBC # BLD: 9.1 THOU/MM3 (ref 4.8–10.8)
WBC UA: ABNORMAL /HPF
YEAST: ABNORMAL

## 2019-02-21 PROCEDURE — 36415 COLL VENOUS BLD VENIPUNCTURE: CPT

## 2019-02-21 PROCEDURE — 80307 DRUG TEST PRSMV CHEM ANLYZR: CPT

## 2019-02-21 PROCEDURE — 80053 COMPREHEN METABOLIC PANEL: CPT

## 2019-02-21 PROCEDURE — 99284 EMERGENCY DEPT VISIT MOD MDM: CPT

## 2019-02-21 PROCEDURE — 6370000000 HC RX 637 (ALT 250 FOR IP): Performed by: FAMILY MEDICINE

## 2019-02-21 PROCEDURE — 85025 COMPLETE CBC W/AUTO DIFF WBC: CPT

## 2019-02-21 PROCEDURE — 87077 CULTURE AEROBIC IDENTIFY: CPT

## 2019-02-21 PROCEDURE — 85730 THROMBOPLASTIN TIME PARTIAL: CPT

## 2019-02-21 PROCEDURE — 81001 URINALYSIS AUTO W/SCOPE: CPT

## 2019-02-21 PROCEDURE — 85610 PROTHROMBIN TIME: CPT

## 2019-02-21 PROCEDURE — 72125 CT NECK SPINE W/O DYE: CPT

## 2019-02-21 PROCEDURE — 72131 CT LUMBAR SPINE W/O DYE: CPT

## 2019-02-21 PROCEDURE — 87086 URINE CULTURE/COLONY COUNT: CPT

## 2019-02-21 PROCEDURE — 94761 N-INVAS EAR/PLS OXIMETRY MLT: CPT

## 2019-02-21 PROCEDURE — 94640 AIRWAY INHALATION TREATMENT: CPT

## 2019-02-21 PROCEDURE — 87186 SC STD MICRODIL/AGAR DIL: CPT

## 2019-02-21 PROCEDURE — 73564 X-RAY EXAM KNEE 4 OR MORE: CPT

## 2019-02-21 PROCEDURE — 70450 CT HEAD/BRAIN W/O DYE: CPT

## 2019-02-21 PROCEDURE — 87184 SC STD DISK METHOD PER PLATE: CPT

## 2019-02-21 RX ORDER — OXYCODONE HYDROCHLORIDE AND ACETAMINOPHEN 5; 325 MG/1; MG/1
1-2 TABLET ORAL EVERY 6 HOURS PRN
Qty: 10 TABLET | Refills: 0 | Status: SHIPPED | OUTPATIENT
Start: 2019-02-21 | End: 2019-02-24

## 2019-02-21 RX ORDER — DOXYCYCLINE 100 MG/1
100 TABLET ORAL 2 TIMES DAILY
Qty: 20 TABLET | Refills: 0 | Status: SHIPPED | OUTPATIENT
Start: 2019-02-21 | End: 2019-03-03

## 2019-02-21 RX ORDER — IPRATROPIUM BROMIDE AND ALBUTEROL SULFATE 2.5; .5 MG/3ML; MG/3ML
2 SOLUTION RESPIRATORY (INHALATION) ONCE
Status: COMPLETED | OUTPATIENT
Start: 2019-02-21 | End: 2019-02-21

## 2019-02-21 RX ORDER — OXYCODONE HYDROCHLORIDE AND ACETAMINOPHEN 5; 325 MG/1; MG/1
1 TABLET ORAL ONCE
Status: COMPLETED | OUTPATIENT
Start: 2019-02-21 | End: 2019-02-21

## 2019-02-21 RX ADMIN — IPRATROPIUM BROMIDE AND ALBUTEROL SULFATE 2 AMPULE: .5; 3 SOLUTION RESPIRATORY (INHALATION) at 20:05

## 2019-02-21 RX ADMIN — OXYCODONE AND ACETAMINOPHEN 1 TABLET: 5; 325 TABLET ORAL at 19:41

## 2019-02-21 ASSESSMENT — ENCOUNTER SYMPTOMS
WHEEZING: 0
EYE DISCHARGE: 0
SHORTNESS OF BREATH: 0
BACK PAIN: 1
RHINORRHEA: 0
NAUSEA: 0
SORE THROAT: 0
ABDOMINAL PAIN: 0
COUGH: 1
VOMITING: 0
DIARRHEA: 0
EYE PAIN: 0

## 2019-02-21 ASSESSMENT — PAIN DESCRIPTION - DESCRIPTORS: DESCRIPTORS: ACHING

## 2019-02-21 ASSESSMENT — PAIN DESCRIPTION - FREQUENCY: FREQUENCY: CONTINUOUS

## 2019-02-21 ASSESSMENT — PAIN SCALES - GENERAL
PAINLEVEL_OUTOF10: 8
PAINLEVEL_OUTOF10: 10
PAINLEVEL_OUTOF10: 10

## 2019-02-24 LAB
ORGANISM: ABNORMAL
URINE CULTURE REFLEX: ABNORMAL

## 2019-02-25 ENCOUNTER — HOSPITAL ENCOUNTER (OUTPATIENT)
Age: 64
Setting detail: SPECIMEN
Discharge: HOME | End: 2019-02-25
Payer: COMMERCIAL

## 2019-02-25 LAB
ABSOLUTE EOS #: 0.06 K/UL (ref 0–0.44)
ABSOLUTE IMMATURE GRANULOCYTE: 0.04 K/UL (ref 0–0.3)
ABSOLUTE LYMPH #: 3.25 K/UL (ref 1.1–3.7)
ABSOLUTE MONO #: 0.57 K/UL (ref 0.1–1.2)
ALBUMIN SERPL-MCNC: 4.7 G/DL (ref 3.5–5.2)
ALBUMIN/GLOBULIN RATIO: 2 (ref 1–2.5)
ALP BLD-CCNC: 87 U/L (ref 35–104)
ALT SERPL-CCNC: 17 U/L (ref 5–33)
ANION GAP SERPL CALCULATED.3IONS-SCNC: 16 MMOL/L (ref 9–17)
AST SERPL-CCNC: 14 U/L
BASOPHILS # BLD: 0 % (ref 0–2)
BASOPHILS ABSOLUTE: 0.03 K/UL (ref 0–0.2)
BILIRUB SERPL-MCNC: 0.36 MG/DL (ref 0.3–1.2)
BUN BLDV-MCNC: 10 MG/DL (ref 8–23)
BUN/CREAT BLD: ABNORMAL (ref 9–20)
CALCIUM SERPL-MCNC: 10.8 MG/DL (ref 8.6–10.4)
CHLORIDE BLD-SCNC: 96 MMOL/L (ref 98–107)
CHOLESTEROL/HDL RATIO: 7.9
CHOLESTEROL: 286 MG/DL
CO2: 29 MMOL/L (ref 20–31)
CREAT SERPL-MCNC: 0.61 MG/DL (ref 0.5–0.9)
DIFFERENTIAL TYPE: ABNORMAL
EOSINOPHILS RELATIVE PERCENT: 1 % (ref 1–4)
GFR AFRICAN AMERICAN: >60 ML/MIN
GFR NON-AFRICAN AMERICAN: >60 ML/MIN
GFR SERPL CREATININE-BSD FRML MDRD: ABNORMAL ML/MIN/{1.73_M2}
GFR SERPL CREATININE-BSD FRML MDRD: ABNORMAL ML/MIN/{1.73_M2}
GLUCOSE BLD-MCNC: 141 MG/DL (ref 70–99)
HCT VFR BLD CALC: 42.9 % (ref 36.3–47.1)
HDLC SERPL-MCNC: 36 MG/DL
HEMOGLOBIN: 14.3 G/DL (ref 11.9–15.1)
IMMATURE GRANULOCYTES: 1 %
LDL CHOLESTEROL: ABNORMAL MG/DL (ref 0–130)
LYMPHOCYTES # BLD: 44 % (ref 24–43)
MCH RBC QN AUTO: 27.4 PG (ref 25.2–33.5)
MCHC RBC AUTO-ENTMCNC: 33.3 G/DL (ref 28.4–34.8)
MCV RBC AUTO: 82.2 FL (ref 82.6–102.9)
MONOCYTES # BLD: 8 % (ref 3–12)
NRBC AUTOMATED: 0 PER 100 WBC
PDW BLD-RTO: 12.2 % (ref 11.8–14.4)
PLATELET # BLD: 279 K/UL (ref 138–453)
PLATELET ESTIMATE: ABNORMAL
PMV BLD AUTO: 9.5 FL (ref 8.1–13.5)
POTASSIUM SERPL-SCNC: 3.8 MMOL/L (ref 3.7–5.3)
RBC # BLD: 5.22 M/UL (ref 3.95–5.11)
RBC # BLD: ABNORMAL 10*6/UL
SEG NEUTROPHILS: 46 % (ref 36–65)
SEGMENTED NEUTROPHILS ABSOLUTE COUNT: 3.43 K/UL (ref 1.5–8.1)
SODIUM BLD-SCNC: 141 MMOL/L (ref 135–144)
TOTAL PROTEIN: 7 G/DL (ref 6.4–8.3)
TRIGL SERPL-MCNC: 740 MG/DL
TSH SERPL DL<=0.05 MIU/L-ACNC: 2.08 MIU/L (ref 0.3–5)
VLDLC SERPL CALC-MCNC: ABNORMAL MG/DL (ref 1–30)
WBC # BLD: 7.4 K/UL (ref 3.5–11.3)
WBC # BLD: ABNORMAL 10*3/UL

## 2019-02-27 LAB — LDL CHOLESTEROL DIRECT: 137 MG/DL

## 2019-03-03 ENCOUNTER — APPOINTMENT (OUTPATIENT)
Dept: GENERAL RADIOLOGY | Age: 64
End: 2019-03-03
Payer: COMMERCIAL

## 2019-03-03 ENCOUNTER — APPOINTMENT (OUTPATIENT)
Dept: CT IMAGING | Age: 64
End: 2019-03-03
Payer: COMMERCIAL

## 2019-03-03 ENCOUNTER — HOSPITAL ENCOUNTER (EMERGENCY)
Age: 64
Discharge: HOME | End: 2019-03-03
Payer: COMMERCIAL

## 2019-03-03 VITALS
BODY MASS INDEX: 29.02 KG/M2 | TEMPERATURE: 97.7 F | WEIGHT: 170 LBS | RESPIRATION RATE: 18 BRPM | HEIGHT: 64 IN | HEART RATE: 68 BPM | DIASTOLIC BLOOD PRESSURE: 70 MMHG | OXYGEN SATURATION: 97 % | SYSTOLIC BLOOD PRESSURE: 112 MMHG

## 2019-03-03 DIAGNOSIS — J44.1 COPD EXACERBATION (HCC): Primary | ICD-10-CM

## 2019-03-03 DIAGNOSIS — F14.90 COCAINE USE: ICD-10-CM

## 2019-03-03 DIAGNOSIS — R23.8 SKIN IRRITATION: ICD-10-CM

## 2019-03-03 DIAGNOSIS — K59.00 CONSTIPATION, UNSPECIFIED CONSTIPATION TYPE: ICD-10-CM

## 2019-03-03 LAB
ALBUMIN SERPL-MCNC: 4.3 G/DL (ref 3.5–5.1)
ALP BLD-CCNC: 76 U/L (ref 38–126)
ALT SERPL-CCNC: 17 U/L (ref 11–66)
AMPHETAMINE+METHAMPHETAMINE URINE SCREEN: NEGATIVE
ANION GAP SERPL CALCULATED.3IONS-SCNC: 16 MEQ/L (ref 8–16)
AST SERPL-CCNC: 13 U/L (ref 5–40)
BACTERIA: ABNORMAL /HPF
BARBITURATE QUANTITATIVE URINE: NEGATIVE
BASOPHILS # BLD: 0.5 %
BASOPHILS ABSOLUTE: 0 THOU/MM3 (ref 0–0.1)
BENZODIAZEPINE QUANTITATIVE URINE: NEGATIVE
BILIRUB SERPL-MCNC: 0.2 MG/DL (ref 0.3–1.2)
BILIRUBIN DIRECT: < 0.2 MG/DL (ref 0–0.3)
BILIRUBIN URINE: NEGATIVE
BLOOD, URINE: NEGATIVE
BUN BLDV-MCNC: 7 MG/DL (ref 7–22)
CALCIUM SERPL-MCNC: 9.4 MG/DL (ref 8.5–10.5)
CANNABINOID QUANTITATIVE URINE: NEGATIVE
CASTS 2: ABNORMAL /LPF
CASTS UA: ABNORMAL /LPF
CHARACTER, URINE: ABNORMAL
CHLORIDE BLD-SCNC: 94 MEQ/L (ref 98–111)
CO2: 24 MEQ/L (ref 23–33)
COCAINE METABOLITE QUANTITATIVE URINE: POSITIVE
COLOR: YELLOW
CREAT SERPL-MCNC: 0.6 MG/DL (ref 0.4–1.2)
CRYSTALS, UA: ABNORMAL
EKG ATRIAL RATE: 68 BPM
EKG P AXIS: 53 DEGREES
EKG P-R INTERVAL: 148 MS
EKG Q-T INTERVAL: 464 MS
EKG QRS DURATION: 84 MS
EKG QTC CALCULATION (BAZETT): 493 MS
EKG R AXIS: -6 DEGREES
EKG T AXIS: 66 DEGREES
EKG VENTRICULAR RATE: 68 BPM
EOSINOPHIL # BLD: 0.9 %
EOSINOPHILS ABSOLUTE: 0.1 THOU/MM3 (ref 0–0.4)
EPITHELIAL CELLS, UA: ABNORMAL /HPF
ERYTHROCYTE [DISTWIDTH] IN BLOOD BY AUTOMATED COUNT: 12.2 % (ref 11.5–14.5)
ERYTHROCYTE [DISTWIDTH] IN BLOOD BY AUTOMATED COUNT: 35.1 FL (ref 35–45)
FLU A ANTIGEN: NEGATIVE
FLU B ANTIGEN: NEGATIVE
GFR SERPL CREATININE-BSD FRML MDRD: > 90 ML/MIN/1.73M2
GLUCOSE BLD-MCNC: 124 MG/DL (ref 70–108)
GLUCOSE URINE: NEGATIVE MG/DL
HCT VFR BLD CALC: 40.9 % (ref 37–47)
HEMOGLOBIN: 13.4 GM/DL (ref 12–16)
IMMATURE GRANS (ABS): 0.03 THOU/MM3 (ref 0–0.07)
IMMATURE GRANULOCYTES: 0.4 %
KETONES, URINE: NEGATIVE
LEUKOCYTE ESTERASE, URINE: ABNORMAL
LIPASE: 34.5 U/L (ref 5.6–51.3)
LYMPHOCYTES # BLD: 55.2 %
LYMPHOCYTES ABSOLUTE: 4.3 THOU/MM3 (ref 1–4.8)
MCH RBC QN AUTO: 26.3 PG (ref 26–33)
MCHC RBC AUTO-ENTMCNC: 32.8 GM/DL (ref 32.2–35.5)
MCV RBC AUTO: 80.2 FL (ref 81–99)
MISCELLANEOUS 2: ABNORMAL
MONOCYTES # BLD: 6.9 %
MONOCYTES ABSOLUTE: 0.5 THOU/MM3 (ref 0.4–1.3)
NITRITE, URINE: NEGATIVE
NUCLEATED RED BLOOD CELLS: 0 /100 WBC
OPIATES, URINE: NEGATIVE
OSMOLALITY CALCULATION: 267.6 MOSMOL/KG (ref 275–300)
OXYCODONE: NEGATIVE
PH UA: 6 (ref 5–9)
PHENCYCLIDINE QUANTITATIVE URINE: NEGATIVE
PLATELET # BLD: 267 THOU/MM3 (ref 130–400)
PMV BLD AUTO: 8.6 FL (ref 9.4–12.4)
POTASSIUM SERPL-SCNC: 3.9 MEQ/L (ref 3.5–5.2)
PROTEIN UA: NEGATIVE
RBC # BLD: 5.1 MILL/MM3 (ref 4.2–5.4)
RBC URINE: ABNORMAL /HPF
RENAL EPITHELIAL, UA: ABNORMAL
SEG NEUTROPHILS: 36.1 %
SEGMENTED NEUTROPHILS ABSOLUTE COUNT: 2.8 THOU/MM3 (ref 1.8–7.7)
SODIUM BLD-SCNC: 134 MEQ/L (ref 135–145)
SPECIFIC GRAVITY, URINE: 1.01 (ref 1–1.03)
TOTAL PROTEIN: 6.7 G/DL (ref 6.1–8)
TROPONIN T: < 0.01 NG/ML
UROBILINOGEN, URINE: 0.2 EU/DL (ref 0–1)
WBC # BLD: 7.8 THOU/MM3 (ref 4.8–10.8)
WBC UA: ABNORMAL /HPF
YEAST: ABNORMAL

## 2019-03-03 PROCEDURE — 74177 CT ABD & PELVIS W/CONTRAST: CPT

## 2019-03-03 PROCEDURE — 94640 AIRWAY INHALATION TREATMENT: CPT

## 2019-03-03 PROCEDURE — 6360000004 HC RX CONTRAST MEDICATION: Performed by: PHYSICIAN ASSISTANT

## 2019-03-03 PROCEDURE — 6370000000 HC RX 637 (ALT 250 FOR IP): Performed by: PHYSICIAN ASSISTANT

## 2019-03-03 PROCEDURE — 36415 COLL VENOUS BLD VENIPUNCTURE: CPT

## 2019-03-03 PROCEDURE — 85025 COMPLETE CBC W/AUTO DIFF WBC: CPT

## 2019-03-03 PROCEDURE — 83690 ASSAY OF LIPASE: CPT

## 2019-03-03 PROCEDURE — 71045 X-RAY EXAM CHEST 1 VIEW: CPT

## 2019-03-03 PROCEDURE — 2580000003 HC RX 258: Performed by: PHYSICIAN ASSISTANT

## 2019-03-03 PROCEDURE — 80307 DRUG TEST PRSMV CHEM ANLYZR: CPT

## 2019-03-03 PROCEDURE — 6360000002 HC RX W HCPCS: Performed by: PHYSICIAN ASSISTANT

## 2019-03-03 PROCEDURE — 99284 EMERGENCY DEPT VISIT MOD MDM: CPT

## 2019-03-03 PROCEDURE — 93005 ELECTROCARDIOGRAM TRACING: CPT | Performed by: PHYSICIAN ASSISTANT

## 2019-03-03 PROCEDURE — 80053 COMPREHEN METABOLIC PANEL: CPT

## 2019-03-03 PROCEDURE — 87804 INFLUENZA ASSAY W/OPTIC: CPT

## 2019-03-03 PROCEDURE — 96374 THER/PROPH/DIAG INJ IV PUSH: CPT

## 2019-03-03 PROCEDURE — 84484 ASSAY OF TROPONIN QUANT: CPT

## 2019-03-03 PROCEDURE — 96375 TX/PRO/DX INJ NEW DRUG ADDON: CPT

## 2019-03-03 PROCEDURE — 82248 BILIRUBIN DIRECT: CPT

## 2019-03-03 PROCEDURE — 2709999900 HC NON-CHARGEABLE SUPPLY

## 2019-03-03 PROCEDURE — 81001 URINALYSIS AUTO W/SCOPE: CPT

## 2019-03-03 RX ORDER — IPRATROPIUM BROMIDE AND ALBUTEROL SULFATE 2.5; .5 MG/3ML; MG/3ML
1 SOLUTION RESPIRATORY (INHALATION) ONCE
Status: COMPLETED | OUTPATIENT
Start: 2019-03-03 | End: 2019-03-03

## 2019-03-03 RX ORDER — ONDANSETRON 2 MG/ML
4 INJECTION INTRAMUSCULAR; INTRAVENOUS ONCE
Status: COMPLETED | OUTPATIENT
Start: 2019-03-03 | End: 2019-03-03

## 2019-03-03 RX ORDER — 0.9 % SODIUM CHLORIDE 0.9 %
1000 INTRAVENOUS SOLUTION INTRAVENOUS ONCE
Status: COMPLETED | OUTPATIENT
Start: 2019-03-03 | End: 2019-03-03

## 2019-03-03 RX ORDER — KETOROLAC TROMETHAMINE 30 MG/ML
30 INJECTION, SOLUTION INTRAMUSCULAR; INTRAVENOUS ONCE
Status: COMPLETED | OUTPATIENT
Start: 2019-03-03 | End: 2019-03-03

## 2019-03-03 RX ORDER — ACETAMINOPHEN 325 MG/1
650 TABLET ORAL ONCE
Status: COMPLETED | OUTPATIENT
Start: 2019-03-03 | End: 2019-03-03

## 2019-03-03 RX ORDER — PREDNISONE 20 MG/1
40 TABLET ORAL DAILY
Qty: 10 TABLET | Refills: 0 | Status: SHIPPED | OUTPATIENT
Start: 2019-03-03 | End: 2019-03-08

## 2019-03-03 RX ORDER — METHYLPREDNISOLONE SODIUM SUCCINATE 125 MG/2ML
80 INJECTION, POWDER, LYOPHILIZED, FOR SOLUTION INTRAMUSCULAR; INTRAVENOUS ONCE
Status: COMPLETED | OUTPATIENT
Start: 2019-03-03 | End: 2019-03-03

## 2019-03-03 RX ADMIN — METHYLPREDNISOLONE SODIUM SUCCINATE 80 MG: 125 INJECTION, POWDER, FOR SOLUTION INTRAMUSCULAR; INTRAVENOUS at 21:29

## 2019-03-03 RX ADMIN — IPRATROPIUM BROMIDE AND ALBUTEROL SULFATE 1 AMPULE: .5; 3 SOLUTION RESPIRATORY (INHALATION) at 21:22

## 2019-03-03 RX ADMIN — IOPAMIDOL 85 ML: 755 INJECTION, SOLUTION INTRAVENOUS at 22:07

## 2019-03-03 RX ADMIN — ACETAMINOPHEN 650 MG: 325 TABLET ORAL at 22:42

## 2019-03-03 RX ADMIN — SODIUM CHLORIDE 1000 ML: 9 INJECTION, SOLUTION INTRAVENOUS at 21:34

## 2019-03-03 RX ADMIN — KETOROLAC TROMETHAMINE 30 MG: 30 INJECTION, SOLUTION INTRAMUSCULAR at 21:28

## 2019-03-03 RX ADMIN — ONDANSETRON 4 MG: 2 INJECTION INTRAMUSCULAR; INTRAVENOUS at 21:29

## 2019-03-03 ASSESSMENT — ENCOUNTER SYMPTOMS
SHORTNESS OF BREATH: 1
VOICE CHANGE: 0
BACK PAIN: 0
TROUBLE SWALLOWING: 0
VOMITING: 0
SINUS PRESSURE: 0
COUGH: 1
CONSTIPATION: 1
CHOKING: 0
EYE PAIN: 0
WHEEZING: 0
BLOOD IN STOOL: 1
ABDOMINAL DISTENTION: 1
ABDOMINAL PAIN: 1
SINUS PAIN: 0
EYE DISCHARGE: 0
NAUSEA: 1
DIARRHEA: 0
RHINORRHEA: 0
STRIDOR: 0
SORE THROAT: 0
ANAL BLEEDING: 1

## 2019-03-03 ASSESSMENT — PAIN DESCRIPTION - DESCRIPTORS: DESCRIPTORS: OTHER (COMMENT)

## 2019-03-03 ASSESSMENT — PAIN DESCRIPTION - PAIN TYPE
TYPE: ACUTE PAIN
TYPE: ACUTE PAIN

## 2019-03-03 ASSESSMENT — PAIN DESCRIPTION - FREQUENCY
FREQUENCY: CONTINUOUS
FREQUENCY: CONTINUOUS

## 2019-03-03 ASSESSMENT — PAIN DESCRIPTION - ORIENTATION
ORIENTATION: LOWER
ORIENTATION: LOWER

## 2019-03-03 ASSESSMENT — PAIN DESCRIPTION - LOCATION
LOCATION: ABDOMEN
LOCATION: ABDOMEN

## 2019-03-03 ASSESSMENT — PAIN DESCRIPTION - PROGRESSION
CLINICAL_PROGRESSION: NOT CHANGED
CLINICAL_PROGRESSION: NOT CHANGED

## 2019-03-03 ASSESSMENT — PAIN SCALES - GENERAL
PAINLEVEL_OUTOF10: 9
PAINLEVEL_OUTOF10: 8

## 2019-03-03 ASSESSMENT — PAIN DESCRIPTION - ONSET
ONSET: ON-GOING
ONSET: ON-GOING

## 2019-03-04 PROCEDURE — 93010 ELECTROCARDIOGRAM REPORT: CPT | Performed by: NUCLEAR MEDICINE

## 2019-03-08 RX ORDER — CLOPIDOGREL BISULFATE 75 MG/1
75 TABLET ORAL DAILY
Qty: 28 TABLET | Refills: 3 | Status: ON HOLD | OUTPATIENT
Start: 2019-03-08 | End: 2020-07-13 | Stop reason: HOSPADM

## 2019-03-11 ENCOUNTER — HOSPITAL ENCOUNTER (INPATIENT)
Age: 64
LOS: 8 days | Discharge: HOME | DRG: 751 | End: 2019-03-19
Attending: FAMILY MEDICINE | Admitting: PSYCHIATRY & NEUROLOGY
Payer: COMMERCIAL

## 2019-03-11 DIAGNOSIS — F32.A DEPRESSION, UNSPECIFIED DEPRESSION TYPE: Primary | ICD-10-CM

## 2019-03-11 PROBLEM — F33.9 MAJOR DEPRESSIVE DISORDER, RECURRENT (HCC): Status: ACTIVE | Noted: 2019-03-11

## 2019-03-11 LAB
ACETAMINOPHEN LEVEL: < 5 UG/ML (ref 0–20)
ALBUMIN SERPL-MCNC: 4.7 G/DL (ref 3.5–5.1)
ALP BLD-CCNC: 72 U/L (ref 38–126)
ALT SERPL-CCNC: 19 U/L (ref 11–66)
AMPHETAMINE+METHAMPHETAMINE URINE SCREEN: NEGATIVE
ANION GAP SERPL CALCULATED.3IONS-SCNC: 22 MEQ/L (ref 8–16)
AST SERPL-CCNC: 23 U/L (ref 5–40)
BACTERIA: ABNORMAL /HPF
BARBITURATE QUANTITATIVE URINE: NEGATIVE
BASOPHILS # BLD: 0.5 %
BASOPHILS ABSOLUTE: 0.1 THOU/MM3 (ref 0–0.1)
BENZODIAZEPINE QUANTITATIVE URINE: NEGATIVE
BILIRUB SERPL-MCNC: 0.4 MG/DL (ref 0.3–1.2)
BILIRUBIN DIRECT: < 0.2 MG/DL (ref 0–0.3)
BILIRUBIN URINE: NEGATIVE
BLOOD, URINE: ABNORMAL
BUN BLDV-MCNC: 9 MG/DL (ref 7–22)
CALCIUM SERPL-MCNC: 10.1 MG/DL (ref 8.5–10.5)
CANNABINOID QUANTITATIVE URINE: NEGATIVE
CASTS 2: ABNORMAL /LPF
CASTS UA: ABNORMAL /LPF
CHARACTER, URINE: CLEAR
CHLORIDE BLD-SCNC: 92 MEQ/L (ref 98–111)
CO2: 21 MEQ/L (ref 23–33)
COCAINE METABOLITE QUANTITATIVE URINE: POSITIVE
COLOR: YELLOW
CREAT SERPL-MCNC: 0.7 MG/DL (ref 0.4–1.2)
CRYSTALS, UA: ABNORMAL
EOSINOPHIL # BLD: 0.5 %
EOSINOPHILS ABSOLUTE: 0.1 THOU/MM3 (ref 0–0.4)
EPITHELIAL CELLS, UA: ABNORMAL /HPF
ERYTHROCYTE [DISTWIDTH] IN BLOOD BY AUTOMATED COUNT: 12.4 % (ref 11.5–14.5)
ERYTHROCYTE [DISTWIDTH] IN BLOOD BY AUTOMATED COUNT: 35.5 FL (ref 35–45)
ETHYL ALCOHOL, SERUM: < 0.01 %
GFR SERPL CREATININE-BSD FRML MDRD: 85 ML/MIN/1.73M2
GLUCOSE BLD-MCNC: 140 MG/DL (ref 70–108)
GLUCOSE URINE: NEGATIVE MG/DL
HCT VFR BLD CALC: 41.6 % (ref 37–47)
HEMOGLOBIN: 13.4 GM/DL (ref 12–16)
IMMATURE GRANS (ABS): 0.08 THOU/MM3 (ref 0–0.07)
IMMATURE GRANULOCYTES: 0.7 %
KETONES, URINE: NEGATIVE
LEUKOCYTE ESTERASE, URINE: ABNORMAL
LYMPHOCYTES # BLD: 31.9 %
LYMPHOCYTES ABSOLUTE: 3.4 THOU/MM3 (ref 1–4.8)
MCH RBC QN AUTO: 25.6 PG (ref 26–33)
MCHC RBC AUTO-ENTMCNC: 32.2 GM/DL (ref 32.2–35.5)
MCV RBC AUTO: 79.5 FL (ref 81–99)
MISCELLANEOUS 2: ABNORMAL
MONOCYTES # BLD: 6.4 %
MONOCYTES ABSOLUTE: 0.7 THOU/MM3 (ref 0.4–1.3)
MUCUS: ABNORMAL
NITRITE, URINE: NEGATIVE
NUCLEATED RED BLOOD CELLS: 0 /100 WBC
OPIATES, URINE: NEGATIVE
OSMOLALITY CALCULATION: 271.1 MOSMOL/KG (ref 275–300)
OXYCODONE: NEGATIVE
PH UA: 6.5 (ref 5–9)
PHENCYCLIDINE QUANTITATIVE URINE: NEGATIVE
PLATELET # BLD: 249 THOU/MM3 (ref 130–400)
PMV BLD AUTO: 8.9 FL (ref 9.4–12.4)
POTASSIUM SERPL-SCNC: 3.5 MEQ/L (ref 3.5–5.2)
PROTEIN UA: NEGATIVE
RBC # BLD: 5.23 MILL/MM3 (ref 4.2–5.4)
RBC URINE: ABNORMAL /HPF
RENAL EPITHELIAL, UA: ABNORMAL
SALICYLATE, SERUM: < 0.3 MG/DL (ref 2–10)
SEG NEUTROPHILS: 60 %
SEGMENTED NEUTROPHILS ABSOLUTE COUNT: 6.4 THOU/MM3 (ref 1.8–7.7)
SODIUM BLD-SCNC: 135 MEQ/L (ref 135–145)
SPECIFIC GRAVITY, URINE: 1.01 (ref 1–1.03)
TOTAL PROTEIN: 7.5 G/DL (ref 6.1–8)
TSH SERPL DL<=0.05 MIU/L-ACNC: 2.19 UIU/ML (ref 0.4–4.2)
UROBILINOGEN, URINE: 0.2 EU/DL (ref 0–1)
WBC # BLD: 10.7 THOU/MM3 (ref 4.8–10.8)
WBC UA: ABNORMAL /HPF
YEAST: ABNORMAL

## 2019-03-11 PROCEDURE — 82248 BILIRUBIN DIRECT: CPT

## 2019-03-11 PROCEDURE — 36415 COLL VENOUS BLD VENIPUNCTURE: CPT

## 2019-03-11 PROCEDURE — G0480 DRUG TEST DEF 1-7 CLASSES: HCPCS

## 2019-03-11 PROCEDURE — 84443 ASSAY THYROID STIM HORMONE: CPT

## 2019-03-11 PROCEDURE — 85025 COMPLETE CBC W/AUTO DIFF WBC: CPT

## 2019-03-11 PROCEDURE — 6370000000 HC RX 637 (ALT 250 FOR IP): Performed by: FAMILY MEDICINE

## 2019-03-11 PROCEDURE — 81001 URINALYSIS AUTO W/SCOPE: CPT

## 2019-03-11 PROCEDURE — 1240000000 HC EMOTIONAL WELLNESS R&B

## 2019-03-11 PROCEDURE — 99285 EMERGENCY DEPT VISIT HI MDM: CPT

## 2019-03-11 PROCEDURE — 80307 DRUG TEST PRSMV CHEM ANLYZR: CPT

## 2019-03-11 PROCEDURE — 80053 COMPREHEN METABOLIC PANEL: CPT

## 2019-03-11 RX ORDER — HYDROXYZINE PAMOATE 25 MG/1
25 CAPSULE ORAL 3 TIMES DAILY PRN
Status: DISCONTINUED | OUTPATIENT
Start: 2019-03-11 | End: 2019-03-19 | Stop reason: HOSPADM

## 2019-03-11 RX ORDER — ACETAMINOPHEN 325 MG/1
650 TABLET ORAL ONCE
Status: COMPLETED | OUTPATIENT
Start: 2019-03-11 | End: 2019-03-11

## 2019-03-11 RX ORDER — NICOTINE 21 MG/24HR
1 PATCH, TRANSDERMAL 24 HOURS TRANSDERMAL DAILY
Status: DISCONTINUED | OUTPATIENT
Start: 2019-03-12 | End: 2019-03-19 | Stop reason: HOSPADM

## 2019-03-11 RX ORDER — TRAZODONE HYDROCHLORIDE 50 MG/1
50 TABLET ORAL NIGHTLY PRN
Status: DISCONTINUED | OUTPATIENT
Start: 2019-03-12 | End: 2019-03-11 | Stop reason: SDUPTHER

## 2019-03-11 RX ORDER — MAGNESIUM HYDROXIDE/ALUMINUM HYDROXICE/SIMETHICONE 120; 1200; 1200 MG/30ML; MG/30ML; MG/30ML
30 SUSPENSION ORAL PRN
Status: DISCONTINUED | OUTPATIENT
Start: 2019-03-11 | End: 2019-03-19 | Stop reason: HOSPADM

## 2019-03-11 RX ORDER — ACETAMINOPHEN 325 MG/1
650 TABLET ORAL EVERY 4 HOURS PRN
Status: DISCONTINUED | OUTPATIENT
Start: 2019-03-11 | End: 2019-03-12

## 2019-03-11 RX ORDER — TRAZODONE HYDROCHLORIDE 50 MG/1
50 TABLET ORAL NIGHTLY PRN
Status: DISCONTINUED | OUTPATIENT
Start: 2019-03-11 | End: 2019-03-19 | Stop reason: HOSPADM

## 2019-03-11 RX ORDER — NYSTATIN 10B UNIT
1 POWDER (EA) MISCELLANEOUS 2 TIMES DAILY
COMMUNITY
End: 2021-02-20

## 2019-03-11 RX ADMIN — ACETAMINOPHEN 650 MG: 325 TABLET ORAL at 21:18

## 2019-03-11 ASSESSMENT — PAIN DESCRIPTION - PROGRESSION
CLINICAL_PROGRESSION_3: NOT CHANGED
CLINICAL_PROGRESSION_2: NOT CHANGED
CLINICAL_PROGRESSION: NOT CHANGED

## 2019-03-11 ASSESSMENT — SLEEP AND FATIGUE QUESTIONNAIRES
AVERAGE NUMBER OF SLEEP HOURS: 3
DIFFICULTY ARISING: YES
DIFFICULTY STAYING ASLEEP: YES
DIFFICULTY ARISING: NO
DIFFICULTY FALLING ASLEEP: YES
DIFFICULTY FALLING ASLEEP: YES
RESTFUL SLEEP: NO
AVERAGE NUMBER OF SLEEP HOURS: 3
DO YOU HAVE DIFFICULTY SLEEPING: YES
DO YOU USE A SLEEP AID: YES
SLEEP PATTERN: DIFFICULTY FALLING ASLEEP;DISTURBED/INTERRUPTED SLEEP
DIFFICULTY STAYING ASLEEP: YES
DO YOU USE A SLEEP AID: COMMENT
RESTFUL SLEEP: NO
DO YOU HAVE DIFFICULTY SLEEPING: YES

## 2019-03-11 ASSESSMENT — ENCOUNTER SYMPTOMS
BACK PAIN: 1
ABDOMINAL PAIN: 0
RHINORRHEA: 0
VOMITING: 0
COUGH: 1
EYE PAIN: 0
SORE THROAT: 0
NAUSEA: 0
DIARRHEA: 0
WHEEZING: 0
SHORTNESS OF BREATH: 0
EYE DISCHARGE: 0

## 2019-03-11 ASSESSMENT — PAIN DESCRIPTION - PAIN TYPE
TYPE_2: CHRONIC PAIN
TYPE: CHRONIC PAIN
TYPE: CHRONIC PAIN

## 2019-03-11 ASSESSMENT — PAIN DESCRIPTION - DESCRIPTORS
DESCRIPTORS_3: ACHING;DISCOMFORT
DESCRIPTORS: ACHING;DISCOMFORT
DESCRIPTORS_2: ACHING;DISCOMFORT

## 2019-03-11 ASSESSMENT — PAIN DESCRIPTION - ONSET
ONSET_3: ON-GOING
ONSET_2: ON-GOING
ONSET: ON-GOING

## 2019-03-11 ASSESSMENT — PAIN DESCRIPTION - ORIENTATION
ORIENTATION_3: OUTER;LEFT
ORIENTATION_2: RIGHT
ORIENTATION: LOWER;UPPER

## 2019-03-11 ASSESSMENT — PAIN DESCRIPTION - DURATION
DURATION_3: CONTINUOUS
DURATION_2: CONTINUOUS

## 2019-03-11 ASSESSMENT — LIFESTYLE VARIABLES
HISTORY_ALCOHOL_USE: NO
HISTORY_ALCOHOL_USE: NO

## 2019-03-11 ASSESSMENT — PAIN DESCRIPTION - LOCATION
LOCATION_3: NECK
LOCATION: BACK
LOCATION: BACK;NECK
LOCATION_2: SHOULDER

## 2019-03-11 ASSESSMENT — PAIN DESCRIPTION - INTENSITY: RATING_2: 7

## 2019-03-11 ASSESSMENT — PATIENT HEALTH QUESTIONNAIRE - PHQ9
SUM OF ALL RESPONSES TO PHQ QUESTIONS 1-9: 23
SUM OF ALL RESPONSES TO PHQ QUESTIONS 1-9: 25

## 2019-03-11 ASSESSMENT — PAIN DESCRIPTION - FREQUENCY: FREQUENCY: CONTINUOUS

## 2019-03-11 ASSESSMENT — PAIN SCALES - GENERAL
PAINLEVEL_OUTOF10: 10
PAINLEVEL_OUTOF10: 10

## 2019-03-11 ASSESSMENT — PAIN - FUNCTIONAL ASSESSMENT: PAIN_FUNCTIONAL_ASSESSMENT: PREVENTS OR INTERFERES SOME ACTIVE ACTIVITIES AND ADLS

## 2019-03-12 PROCEDURE — 6370000000 HC RX 637 (ALT 250 FOR IP): Performed by: PSYCHIATRY & NEUROLOGY

## 2019-03-12 PROCEDURE — 6370000000 HC RX 637 (ALT 250 FOR IP): Performed by: PHYSICIAN ASSISTANT

## 2019-03-12 PROCEDURE — 99222 1ST HOSP IP/OBS MODERATE 55: CPT | Performed by: PSYCHIATRY & NEUROLOGY

## 2019-03-12 PROCEDURE — 2709999900 HC NON-CHARGEABLE SUPPLY

## 2019-03-12 PROCEDURE — 94640 AIRWAY INHALATION TREATMENT: CPT

## 2019-03-12 PROCEDURE — 1240000000 HC EMOTIONAL WELLNESS R&B

## 2019-03-12 PROCEDURE — 2500000003 HC RX 250 WO HCPCS: Performed by: PHYSICIAN ASSISTANT

## 2019-03-12 RX ORDER — ALBUTEROL SULFATE 90 UG/1
2 AEROSOL, METERED RESPIRATORY (INHALATION) EVERY 6 HOURS PRN
Status: DISCONTINUED | OUTPATIENT
Start: 2019-03-12 | End: 2019-03-19 | Stop reason: HOSPADM

## 2019-03-12 RX ORDER — LEVOTHYROXINE SODIUM 0.07 MG/1
75 TABLET ORAL
Status: DISCONTINUED | OUTPATIENT
Start: 2019-03-12 | End: 2019-03-19 | Stop reason: HOSPADM

## 2019-03-12 RX ORDER — LUBIPROSTONE 24 UG/1
24 CAPSULE, GELATIN COATED ORAL 2 TIMES DAILY WITH MEALS
Status: DISCONTINUED | OUTPATIENT
Start: 2019-03-12 | End: 2019-03-19 | Stop reason: HOSPADM

## 2019-03-12 RX ORDER — LISINOPRIL 2.5 MG/1
2.5 TABLET ORAL DAILY
Status: DISCONTINUED | OUTPATIENT
Start: 2019-03-12 | End: 2019-03-19 | Stop reason: HOSPADM

## 2019-03-12 RX ORDER — LEVOTHYROXINE SODIUM 0.15 MG/1
150 TABLET ORAL WEEKLY
Status: DISCONTINUED | OUTPATIENT
Start: 2019-03-17 | End: 2019-03-19 | Stop reason: HOSPADM

## 2019-03-12 RX ORDER — FLUTICASONE PROPIONATE 50 MCG
1 SPRAY, SUSPENSION (ML) NASAL DAILY
Status: DISCONTINUED | OUTPATIENT
Start: 2019-03-12 | End: 2019-03-19 | Stop reason: HOSPADM

## 2019-03-12 RX ORDER — CLOPIDOGREL BISULFATE 75 MG/1
75 TABLET ORAL DAILY
Status: DISCONTINUED | OUTPATIENT
Start: 2019-03-12 | End: 2019-03-19 | Stop reason: HOSPADM

## 2019-03-12 RX ORDER — BENZONATATE 100 MG/1
100 CAPSULE ORAL 3 TIMES DAILY
Status: DISCONTINUED | OUTPATIENT
Start: 2019-03-12 | End: 2019-03-19 | Stop reason: HOSPADM

## 2019-03-12 RX ORDER — OXYCODONE HYDROCHLORIDE AND ACETAMINOPHEN 5; 325 MG/1; MG/1
1 TABLET ORAL EVERY 6 HOURS PRN
Status: DISCONTINUED | OUTPATIENT
Start: 2019-03-12 | End: 2019-03-19 | Stop reason: HOSPADM

## 2019-03-12 RX ORDER — ESCITALOPRAM OXALATE 20 MG/1
20 TABLET ORAL DAILY
Status: ON HOLD | COMMUNITY
End: 2019-03-19 | Stop reason: HOSPADM

## 2019-03-12 RX ORDER — ARIPIPRAZOLE 2 MG/1
2 TABLET ORAL DAILY
Status: DISCONTINUED | OUTPATIENT
Start: 2019-03-12 | End: 2019-03-19 | Stop reason: HOSPADM

## 2019-03-12 RX ORDER — CETIRIZINE HYDROCHLORIDE 10 MG/1
10 TABLET ORAL DAILY
Status: DISCONTINUED | OUTPATIENT
Start: 2019-03-12 | End: 2019-03-19 | Stop reason: HOSPADM

## 2019-03-12 RX ORDER — ALOSETRON HYDROCHLORIDE 0.5 MG/1
0.5 TABLET, FILM COATED ORAL 2 TIMES DAILY
Status: DISCONTINUED | OUTPATIENT
Start: 2019-03-12 | End: 2019-03-19 | Stop reason: HOSPADM

## 2019-03-12 RX ORDER — ESCITALOPRAM OXALATE 20 MG/1
20 TABLET ORAL DAILY
Status: DISCONTINUED | OUTPATIENT
Start: 2019-03-12 | End: 2019-03-19 | Stop reason: HOSPADM

## 2019-03-12 RX ORDER — HYDROCHLOROTHIAZIDE 25 MG/1
25 TABLET ORAL 2 TIMES DAILY
Status: DISCONTINUED | OUTPATIENT
Start: 2019-03-12 | End: 2019-03-19 | Stop reason: HOSPADM

## 2019-03-12 RX ORDER — ALOSETRON HYDROCHLORIDE 0.5 MG/1
0.5 TABLET, FILM COATED ORAL 2 TIMES DAILY
COMMUNITY
End: 2019-12-11

## 2019-03-12 RX ORDER — ALBUTEROL SULFATE 90 UG/1
2 AEROSOL, METERED RESPIRATORY (INHALATION) 2 TIMES DAILY
Status: DISCONTINUED | OUTPATIENT
Start: 2019-03-12 | End: 2019-03-19 | Stop reason: HOSPADM

## 2019-03-12 RX ORDER — MIRTAZAPINE 15 MG/1
7.5 TABLET, FILM COATED ORAL NIGHTLY
Status: DISCONTINUED | OUTPATIENT
Start: 2019-03-12 | End: 2019-03-12

## 2019-03-12 RX ORDER — ISOSORBIDE DINITRATE 10 MG/1
10 TABLET ORAL 3 TIMES DAILY
Status: DISCONTINUED | OUTPATIENT
Start: 2019-03-12 | End: 2019-03-19 | Stop reason: HOSPADM

## 2019-03-12 RX ORDER — PANTOPRAZOLE SODIUM 40 MG/1
40 TABLET, DELAYED RELEASE ORAL DAILY
Status: DISCONTINUED | OUTPATIENT
Start: 2019-03-12 | End: 2019-03-19 | Stop reason: HOSPADM

## 2019-03-12 RX ORDER — BENZONATATE 100 MG/1
100 CAPSULE ORAL 3 TIMES DAILY
COMMUNITY
End: 2019-12-11

## 2019-03-12 RX ORDER — LUBIPROSTONE 24 UG/1
24 CAPSULE, GELATIN COATED ORAL 2 TIMES DAILY WITH MEALS
COMMUNITY
End: 2019-06-05 | Stop reason: ALTCHOICE

## 2019-03-12 RX ORDER — CARVEDILOL 3.12 MG/1
3.12 TABLET ORAL 2 TIMES DAILY WITH MEALS
Status: DISCONTINUED | OUTPATIENT
Start: 2019-03-12 | End: 2019-03-19 | Stop reason: HOSPADM

## 2019-03-12 RX ORDER — FERROUS SULFATE 325(65) MG
325 TABLET ORAL
Status: DISCONTINUED | OUTPATIENT
Start: 2019-03-13 | End: 2019-03-19 | Stop reason: HOSPADM

## 2019-03-12 RX ORDER — MIRTAZAPINE 15 MG/1
15 TABLET, FILM COATED ORAL NIGHTLY
Status: DISCONTINUED | OUTPATIENT
Start: 2019-03-12 | End: 2019-03-19 | Stop reason: HOSPADM

## 2019-03-12 RX ADMIN — CETIRIZINE HYDROCHLORIDE 10 MG: 10 TABLET, FILM COATED ORAL at 20:52

## 2019-03-12 RX ADMIN — QUETIAPINE FUMARATE 500 MG: 300 TABLET, EXTENDED RELEASE ORAL at 20:51

## 2019-03-12 RX ADMIN — BENZONATATE 100 MG: 100 CAPSULE ORAL at 20:52

## 2019-03-12 RX ADMIN — ARIPIPRAZOLE 2 MG: 2 TABLET ORAL at 16:33

## 2019-03-12 RX ADMIN — TRAZODONE HYDROCHLORIDE 50 MG: 50 TABLET ORAL at 00:00

## 2019-03-12 RX ADMIN — TRAZODONE HYDROCHLORIDE 50 MG: 50 TABLET ORAL at 20:52

## 2019-03-12 RX ADMIN — CARVEDILOL 3.12 MG: 3.12 TABLET, FILM COATED ORAL at 20:52

## 2019-03-12 RX ADMIN — PANTOPRAZOLE SODIUM 40 MG: 40 TABLET, DELAYED RELEASE ORAL at 20:51

## 2019-03-12 RX ADMIN — FLUTICASONE PROPIONATE 1 SPRAY: 50 SPRAY, METERED NASAL at 13:42

## 2019-03-12 RX ADMIN — ESCITALOPRAM OXALATE 20 MG: 20 TABLET, FILM COATED ORAL at 20:52

## 2019-03-12 RX ADMIN — OXYCODONE AND ACETAMINOPHEN 1 TABLET: 5; 325 TABLET ORAL at 21:47

## 2019-03-12 RX ADMIN — HYDROXYZINE PAMOATE 25 MG: 25 CAPSULE ORAL at 00:00

## 2019-03-12 RX ADMIN — CLOPIDOGREL BISULFATE 75 MG: 75 TABLET, FILM COATED ORAL at 21:39

## 2019-03-12 RX ADMIN — OXYCODONE AND ACETAMINOPHEN 1 TABLET: 5; 325 TABLET ORAL at 15:46

## 2019-03-12 RX ADMIN — MIRTAZAPINE 15 MG: 15 TABLET, FILM COATED ORAL at 20:54

## 2019-03-12 RX ADMIN — LISINOPRIL 2.5 MG: 2.5 TABLET ORAL at 20:54

## 2019-03-12 RX ADMIN — MICONAZOLE NITRATE: 2 POWDER TOPICAL at 20:57

## 2019-03-12 RX ADMIN — Medication 2 PUFF: at 14:57

## 2019-03-12 RX ADMIN — PANCRELIPASE LIPASE, PANCRELIPASE PROTEASE, PANCRELIPASE AMYLASE 1 CAPSULE: 10000; 32000; 42000 CAPSULE, DELAYED RELEASE ORAL at 20:52

## 2019-03-12 RX ADMIN — ACETAMINOPHEN 650 MG: 325 TABLET ORAL at 08:08

## 2019-03-12 RX ADMIN — MICONAZOLE NITRATE: 2 POWDER TOPICAL at 11:34

## 2019-03-12 RX ADMIN — ISOSORBIDE DINITRATE 10 MG: 10 TABLET ORAL at 20:52

## 2019-03-12 ASSESSMENT — PAIN SCALES - GENERAL
PAINLEVEL_OUTOF10: 10
PAINLEVEL_OUTOF10: 2
PAINLEVEL_OUTOF10: 10
PAINLEVEL_OUTOF10: 8
PAINLEVEL_OUTOF10: 10
PAINLEVEL_OUTOF10: 7

## 2019-03-12 ASSESSMENT — SLEEP AND FATIGUE QUESTIONNAIRES
DO YOU HAVE DIFFICULTY SLEEPING: YES
RESTFUL SLEEP: NO
DIFFICULTY STAYING ASLEEP: YES
DIFFICULTY FALLING ASLEEP: YES
DO YOU USE A SLEEP AID: YES
DIFFICULTY ARISING: YES
AVERAGE NUMBER OF SLEEP HOURS: 3

## 2019-03-12 ASSESSMENT — PAIN DESCRIPTION - PAIN TYPE: TYPE: CHRONIC PAIN

## 2019-03-12 ASSESSMENT — LIFESTYLE VARIABLES: HISTORY_ALCOHOL_USE: NO

## 2019-03-12 ASSESSMENT — PAIN DESCRIPTION - ORIENTATION: ORIENTATION: LOWER

## 2019-03-12 ASSESSMENT — PAIN DESCRIPTION - ONSET: ONSET: ON-GOING

## 2019-03-12 ASSESSMENT — PAIN DESCRIPTION - FREQUENCY: FREQUENCY: CONTINUOUS

## 2019-03-12 ASSESSMENT — PATIENT HEALTH QUESTIONNAIRE - PHQ9: SUM OF ALL RESPONSES TO PHQ QUESTIONS 1-9: 25

## 2019-03-12 ASSESSMENT — PAIN DESCRIPTION - DESCRIPTORS: DESCRIPTORS: ACHING

## 2019-03-12 ASSESSMENT — PAIN DESCRIPTION - LOCATION: LOCATION: BACK

## 2019-03-12 ASSESSMENT — PAIN - FUNCTIONAL ASSESSMENT: PAIN_FUNCTIONAL_ASSESSMENT: ACTIVITIES ARE NOT PREVENTED

## 2019-03-12 ASSESSMENT — PAIN DESCRIPTION - PROGRESSION: CLINICAL_PROGRESSION: NOT CHANGED

## 2019-03-13 PROCEDURE — 1240000000 HC EMOTIONAL WELLNESS R&B

## 2019-03-13 PROCEDURE — 6370000000 HC RX 637 (ALT 250 FOR IP): Performed by: PHYSICIAN ASSISTANT

## 2019-03-13 PROCEDURE — 99232 SBSQ HOSP IP/OBS MODERATE 35: CPT | Performed by: PSYCHIATRY & NEUROLOGY

## 2019-03-13 PROCEDURE — 6370000000 HC RX 637 (ALT 250 FOR IP): Performed by: PSYCHIATRY & NEUROLOGY

## 2019-03-13 PROCEDURE — 94640 AIRWAY INHALATION TREATMENT: CPT

## 2019-03-13 RX ADMIN — OXYCODONE AND ACETAMINOPHEN 1 TABLET: 5; 325 TABLET ORAL at 19:12

## 2019-03-13 RX ADMIN — TRAZODONE HYDROCHLORIDE 50 MG: 50 TABLET ORAL at 20:27

## 2019-03-13 RX ADMIN — LISINOPRIL 2.5 MG: 2.5 TABLET ORAL at 08:32

## 2019-03-13 RX ADMIN — MICONAZOLE NITRATE: 2 POWDER TOPICAL at 09:21

## 2019-03-13 RX ADMIN — OXYCODONE AND ACETAMINOPHEN 1 TABLET: 5; 325 TABLET ORAL at 12:48

## 2019-03-13 RX ADMIN — ARIPIPRAZOLE 2 MG: 2 TABLET ORAL at 08:32

## 2019-03-13 RX ADMIN — Medication 2 PUFF: at 08:52

## 2019-03-13 RX ADMIN — CARVEDILOL 3.12 MG: 3.12 TABLET, FILM COATED ORAL at 16:54

## 2019-03-13 RX ADMIN — PANTOPRAZOLE SODIUM 40 MG: 40 TABLET, DELAYED RELEASE ORAL at 06:18

## 2019-03-13 RX ADMIN — PANCRELIPASE LIPASE, PANCRELIPASE PROTEASE, PANCRELIPASE AMYLASE 1 CAPSULE: 10000; 32000; 42000 CAPSULE, DELAYED RELEASE ORAL at 08:33

## 2019-03-13 RX ADMIN — ISOSORBIDE DINITRATE 10 MG: 10 TABLET ORAL at 13:25

## 2019-03-13 RX ADMIN — Medication 2 PUFF: at 19:52

## 2019-03-13 RX ADMIN — HYDROCHLOROTHIAZIDE 25 MG: 25 TABLET ORAL at 08:32

## 2019-03-13 RX ADMIN — FERROUS SULFATE TAB 325 MG (65 MG ELEMENTAL FE) 325 MG: 325 (65 FE) TAB at 08:33

## 2019-03-13 RX ADMIN — ESCITALOPRAM OXALATE 20 MG: 20 TABLET, FILM COATED ORAL at 08:33

## 2019-03-13 RX ADMIN — CETIRIZINE HYDROCHLORIDE 10 MG: 10 TABLET, FILM COATED ORAL at 08:32

## 2019-03-13 RX ADMIN — QUETIAPINE FUMARATE 500 MG: 300 TABLET, EXTENDED RELEASE ORAL at 20:26

## 2019-03-13 RX ADMIN — LEVOTHYROXINE SODIUM 75 MCG: 75 TABLET ORAL at 06:19

## 2019-03-13 RX ADMIN — MIRTAZAPINE 15 MG: 15 TABLET, FILM COATED ORAL at 20:26

## 2019-03-13 RX ADMIN — BENZONATATE 100 MG: 100 CAPSULE ORAL at 20:26

## 2019-03-13 RX ADMIN — BENZONATATE 100 MG: 100 CAPSULE ORAL at 13:25

## 2019-03-13 RX ADMIN — PANCRELIPASE LIPASE, PANCRELIPASE PROTEASE, PANCRELIPASE AMYLASE 1 CAPSULE: 10000; 32000; 42000 CAPSULE, DELAYED RELEASE ORAL at 12:45

## 2019-03-13 RX ADMIN — ISOSORBIDE DINITRATE 10 MG: 10 TABLET ORAL at 20:26

## 2019-03-13 RX ADMIN — HYDROCHLOROTHIAZIDE 25 MG: 25 TABLET ORAL at 20:26

## 2019-03-13 RX ADMIN — FLUTICASONE PROPIONATE 1 SPRAY: 50 SPRAY, METERED NASAL at 08:32

## 2019-03-13 RX ADMIN — TIOTROPIUM BROMIDE 18 MCG: 18 CAPSULE ORAL; RESPIRATORY (INHALATION) at 08:52

## 2019-03-13 RX ADMIN — MICONAZOLE NITRATE: 2 POWDER TOPICAL at 20:27

## 2019-03-13 RX ADMIN — CLOPIDOGREL BISULFATE 75 MG: 75 TABLET, FILM COATED ORAL at 08:33

## 2019-03-13 RX ADMIN — CARVEDILOL 3.12 MG: 3.12 TABLET, FILM COATED ORAL at 08:33

## 2019-03-13 RX ADMIN — PANCRELIPASE LIPASE, PANCRELIPASE PROTEASE, PANCRELIPASE AMYLASE 1 CAPSULE: 10000; 32000; 42000 CAPSULE, DELAYED RELEASE ORAL at 16:54

## 2019-03-13 RX ADMIN — ISOSORBIDE DINITRATE 10 MG: 10 TABLET ORAL at 08:32

## 2019-03-13 RX ADMIN — OXYCODONE AND ACETAMINOPHEN 1 TABLET: 5; 325 TABLET ORAL at 06:19

## 2019-03-13 RX ADMIN — BENZONATATE 100 MG: 100 CAPSULE ORAL at 08:32

## 2019-03-13 ASSESSMENT — PAIN DESCRIPTION - PROGRESSION
CLINICAL_PROGRESSION: NOT CHANGED
CLINICAL_PROGRESSION: NOT CHANGED

## 2019-03-13 ASSESSMENT — PAIN - FUNCTIONAL ASSESSMENT
PAIN_FUNCTIONAL_ASSESSMENT: ACTIVITIES ARE NOT PREVENTED
PAIN_FUNCTIONAL_ASSESSMENT: ACTIVITIES ARE NOT PREVENTED

## 2019-03-13 ASSESSMENT — PAIN SCALES - GENERAL
PAINLEVEL_OUTOF10: 7
PAINLEVEL_OUTOF10: 7
PAINLEVEL_OUTOF10: 5
PAINLEVEL_OUTOF10: 7
PAINLEVEL_OUTOF10: 7
PAINLEVEL_OUTOF10: 8

## 2019-03-13 ASSESSMENT — PAIN DESCRIPTION - PAIN TYPE
TYPE: CHRONIC PAIN
TYPE: CHRONIC PAIN

## 2019-03-13 ASSESSMENT — PAIN DESCRIPTION - ORIENTATION
ORIENTATION: UPPER
ORIENTATION: UPPER

## 2019-03-13 ASSESSMENT — PAIN DESCRIPTION - ONSET
ONSET: ON-GOING
ONSET: ON-GOING

## 2019-03-13 ASSESSMENT — PAIN DESCRIPTION - DIRECTION
RADIATING_TOWARDS: NECK
RADIATING_TOWARDS: NECK

## 2019-03-13 ASSESSMENT — PAIN DESCRIPTION - LOCATION
LOCATION: BACK
LOCATION: BACK

## 2019-03-13 ASSESSMENT — PAIN DESCRIPTION - FREQUENCY
FREQUENCY: CONTINUOUS
FREQUENCY: CONTINUOUS

## 2019-03-13 ASSESSMENT — PAIN DESCRIPTION - DESCRIPTORS
DESCRIPTORS: ACHING
DESCRIPTORS: ACHING

## 2019-03-14 PROCEDURE — 99232 SBSQ HOSP IP/OBS MODERATE 35: CPT | Performed by: PSYCHIATRY & NEUROLOGY

## 2019-03-14 PROCEDURE — 6370000000 HC RX 637 (ALT 250 FOR IP): Performed by: PSYCHIATRY & NEUROLOGY

## 2019-03-14 PROCEDURE — 1240000000 HC EMOTIONAL WELLNESS R&B

## 2019-03-14 PROCEDURE — 94640 AIRWAY INHALATION TREATMENT: CPT

## 2019-03-14 RX ADMIN — HYDROXYZINE PAMOATE 25 MG: 25 CAPSULE ORAL at 17:16

## 2019-03-14 RX ADMIN — OXYCODONE AND ACETAMINOPHEN 1 TABLET: 5; 325 TABLET ORAL at 04:09

## 2019-03-14 RX ADMIN — FERROUS SULFATE TAB 325 MG (65 MG ELEMENTAL FE) 325 MG: 325 (65 FE) TAB at 08:44

## 2019-03-14 RX ADMIN — TIOTROPIUM BROMIDE 18 MCG: 18 CAPSULE ORAL; RESPIRATORY (INHALATION) at 07:46

## 2019-03-14 RX ADMIN — MIRTAZAPINE 15 MG: 15 TABLET, FILM COATED ORAL at 21:09

## 2019-03-14 RX ADMIN — CETIRIZINE HYDROCHLORIDE 10 MG: 10 TABLET, FILM COATED ORAL at 08:44

## 2019-03-14 RX ADMIN — CLOPIDOGREL BISULFATE 75 MG: 75 TABLET, FILM COATED ORAL at 08:47

## 2019-03-14 RX ADMIN — BENZONATATE 100 MG: 100 CAPSULE ORAL at 08:45

## 2019-03-14 RX ADMIN — ESCITALOPRAM OXALATE 20 MG: 20 TABLET, FILM COATED ORAL at 08:44

## 2019-03-14 RX ADMIN — QUETIAPINE FUMARATE 500 MG: 300 TABLET, EXTENDED RELEASE ORAL at 21:09

## 2019-03-14 RX ADMIN — Medication 2 PUFF: at 22:01

## 2019-03-14 RX ADMIN — PANTOPRAZOLE SODIUM 40 MG: 40 TABLET, DELAYED RELEASE ORAL at 06:50

## 2019-03-14 RX ADMIN — ISOSORBIDE DINITRATE 10 MG: 10 TABLET ORAL at 08:44

## 2019-03-14 RX ADMIN — BENZONATATE 100 MG: 100 CAPSULE ORAL at 14:11

## 2019-03-14 RX ADMIN — ARIPIPRAZOLE 2 MG: 2 TABLET ORAL at 08:45

## 2019-03-14 RX ADMIN — HYDROCHLOROTHIAZIDE 25 MG: 25 TABLET ORAL at 21:09

## 2019-03-14 RX ADMIN — BENZONATATE 100 MG: 100 CAPSULE ORAL at 21:09

## 2019-03-14 RX ADMIN — CARVEDILOL 3.12 MG: 3.12 TABLET, FILM COATED ORAL at 17:18

## 2019-03-14 RX ADMIN — FLUTICASONE PROPIONATE 1 SPRAY: 50 SPRAY, METERED NASAL at 08:45

## 2019-03-14 RX ADMIN — MICONAZOLE NITRATE: 2 POWDER TOPICAL at 08:45

## 2019-03-14 RX ADMIN — MICONAZOLE NITRATE: 2 POWDER TOPICAL at 21:10

## 2019-03-14 RX ADMIN — TRAZODONE HYDROCHLORIDE 50 MG: 50 TABLET ORAL at 21:09

## 2019-03-14 RX ADMIN — OXYCODONE AND ACETAMINOPHEN 1 TABLET: 5; 325 TABLET ORAL at 18:23

## 2019-03-14 RX ADMIN — LISINOPRIL 2.5 MG: 2.5 TABLET ORAL at 08:44

## 2019-03-14 RX ADMIN — CARVEDILOL 3.12 MG: 3.12 TABLET, FILM COATED ORAL at 08:44

## 2019-03-14 RX ADMIN — PANCRELIPASE LIPASE, PANCRELIPASE PROTEASE, PANCRELIPASE AMYLASE 1 CAPSULE: 10000; 32000; 42000 CAPSULE, DELAYED RELEASE ORAL at 17:18

## 2019-03-14 RX ADMIN — ISOSORBIDE DINITRATE 10 MG: 10 TABLET ORAL at 14:11

## 2019-03-14 RX ADMIN — HYDROXYZINE PAMOATE 25 MG: 25 CAPSULE ORAL at 08:48

## 2019-03-14 RX ADMIN — PANCRELIPASE LIPASE, PANCRELIPASE PROTEASE, PANCRELIPASE AMYLASE 1 CAPSULE: 10000; 32000; 42000 CAPSULE, DELAYED RELEASE ORAL at 08:44

## 2019-03-14 RX ADMIN — LEVOTHYROXINE SODIUM 75 MCG: 75 TABLET ORAL at 06:49

## 2019-03-14 RX ADMIN — ISOSORBIDE DINITRATE 10 MG: 10 TABLET ORAL at 21:09

## 2019-03-14 RX ADMIN — PANCRELIPASE LIPASE, PANCRELIPASE PROTEASE, PANCRELIPASE AMYLASE 1 CAPSULE: 10000; 32000; 42000 CAPSULE, DELAYED RELEASE ORAL at 12:07

## 2019-03-14 RX ADMIN — OXYCODONE AND ACETAMINOPHEN 1 TABLET: 5; 325 TABLET ORAL at 12:11

## 2019-03-14 RX ADMIN — HYDROCHLOROTHIAZIDE 25 MG: 25 TABLET ORAL at 08:44

## 2019-03-14 RX ADMIN — Medication 2 PUFF: at 07:47

## 2019-03-14 ASSESSMENT — PAIN DESCRIPTION - DESCRIPTORS: DESCRIPTORS: ACHING

## 2019-03-14 ASSESSMENT — PAIN DESCRIPTION - FREQUENCY: FREQUENCY: CONTINUOUS

## 2019-03-14 ASSESSMENT — PAIN DESCRIPTION - PAIN TYPE: TYPE: CHRONIC PAIN

## 2019-03-14 ASSESSMENT — PAIN SCALES - GENERAL
PAINLEVEL_OUTOF10: 8
PAINLEVEL_OUTOF10: 0
PAINLEVEL_OUTOF10: 7
PAINLEVEL_OUTOF10: 8
PAINLEVEL_OUTOF10: 0
PAINLEVEL_OUTOF10: 8
PAINLEVEL_OUTOF10: 8
PAINLEVEL_OUTOF10: 6

## 2019-03-14 ASSESSMENT — PAIN DESCRIPTION - ONSET: ONSET: ON-GOING

## 2019-03-14 ASSESSMENT — PAIN DESCRIPTION - LOCATION: LOCATION: BACK

## 2019-03-14 ASSESSMENT — PAIN DESCRIPTION - INTENSITY
RATING_2: 0
RATING_3: 0

## 2019-03-14 ASSESSMENT — PAIN - FUNCTIONAL ASSESSMENT: PAIN_FUNCTIONAL_ASSESSMENT: ACTIVITIES ARE NOT PREVENTED

## 2019-03-14 ASSESSMENT — PAIN DESCRIPTION - DIRECTION: RADIATING_TOWARDS: NECK

## 2019-03-14 ASSESSMENT — PAIN DESCRIPTION - PROGRESSION: CLINICAL_PROGRESSION: NOT CHANGED

## 2019-03-14 ASSESSMENT — PAIN DESCRIPTION - ORIENTATION: ORIENTATION: UPPER

## 2019-03-15 PROCEDURE — 99231 SBSQ HOSP IP/OBS SF/LOW 25: CPT | Performed by: PHYSICIAN ASSISTANT

## 2019-03-15 PROCEDURE — 1240000000 HC EMOTIONAL WELLNESS R&B

## 2019-03-15 PROCEDURE — 6370000000 HC RX 637 (ALT 250 FOR IP): Performed by: PSYCHIATRY & NEUROLOGY

## 2019-03-15 PROCEDURE — 94640 AIRWAY INHALATION TREATMENT: CPT

## 2019-03-15 RX ORDER — GUAIFENESIN/DEXTROMETHORPHAN 100-10MG/5
5 SYRUP ORAL EVERY 4 HOURS PRN
Status: DISCONTINUED | OUTPATIENT
Start: 2019-03-15 | End: 2019-03-19 | Stop reason: HOSPADM

## 2019-03-15 RX ADMIN — CARVEDILOL 3.12 MG: 3.12 TABLET, FILM COATED ORAL at 08:32

## 2019-03-15 RX ADMIN — MIRTAZAPINE 15 MG: 15 TABLET, FILM COATED ORAL at 21:19

## 2019-03-15 RX ADMIN — BENZONATATE 100 MG: 100 CAPSULE ORAL at 21:19

## 2019-03-15 RX ADMIN — CARVEDILOL 3.12 MG: 3.12 TABLET, FILM COATED ORAL at 17:07

## 2019-03-15 RX ADMIN — TIOTROPIUM BROMIDE 18 MCG: 18 CAPSULE ORAL; RESPIRATORY (INHALATION) at 08:57

## 2019-03-15 RX ADMIN — BENZONATATE 100 MG: 100 CAPSULE ORAL at 13:30

## 2019-03-15 RX ADMIN — PANTOPRAZOLE SODIUM 40 MG: 40 TABLET, DELAYED RELEASE ORAL at 06:27

## 2019-03-15 RX ADMIN — ISOSORBIDE DINITRATE 10 MG: 10 TABLET ORAL at 13:30

## 2019-03-15 RX ADMIN — QUETIAPINE FUMARATE 500 MG: 300 TABLET, EXTENDED RELEASE ORAL at 21:19

## 2019-03-15 RX ADMIN — ARIPIPRAZOLE 2 MG: 2 TABLET ORAL at 08:33

## 2019-03-15 RX ADMIN — HYDROXYZINE PAMOATE 25 MG: 25 CAPSULE ORAL at 08:32

## 2019-03-15 RX ADMIN — Medication 2 PUFF: at 18:27

## 2019-03-15 RX ADMIN — PANCRELIPASE LIPASE, PANCRELIPASE PROTEASE, PANCRELIPASE AMYLASE 1 CAPSULE: 10000; 32000; 42000 CAPSULE, DELAYED RELEASE ORAL at 13:29

## 2019-03-15 RX ADMIN — OXYCODONE AND ACETAMINOPHEN 1 TABLET: 5; 325 TABLET ORAL at 08:32

## 2019-03-15 RX ADMIN — TRAZODONE HYDROCHLORIDE 50 MG: 50 TABLET ORAL at 21:19

## 2019-03-15 RX ADMIN — LISINOPRIL 2.5 MG: 2.5 TABLET ORAL at 08:32

## 2019-03-15 RX ADMIN — OXYCODONE AND ACETAMINOPHEN 1 TABLET: 5; 325 TABLET ORAL at 21:23

## 2019-03-15 RX ADMIN — HYDROCHLOROTHIAZIDE 25 MG: 25 TABLET ORAL at 08:32

## 2019-03-15 RX ADMIN — MICONAZOLE NITRATE: 2 POWDER TOPICAL at 22:00

## 2019-03-15 RX ADMIN — LEVOTHYROXINE SODIUM 75 MCG: 75 TABLET ORAL at 06:27

## 2019-03-15 RX ADMIN — ISOSORBIDE DINITRATE 10 MG: 10 TABLET ORAL at 08:32

## 2019-03-15 RX ADMIN — OXYCODONE AND ACETAMINOPHEN 1 TABLET: 5; 325 TABLET ORAL at 14:41

## 2019-03-15 RX ADMIN — CLOPIDOGREL BISULFATE 75 MG: 75 TABLET, FILM COATED ORAL at 08:32

## 2019-03-15 RX ADMIN — HYDROCHLOROTHIAZIDE 25 MG: 25 TABLET ORAL at 21:19

## 2019-03-15 RX ADMIN — FLUTICASONE PROPIONATE 1 SPRAY: 50 SPRAY, METERED NASAL at 08:32

## 2019-03-15 RX ADMIN — BENZONATATE 100 MG: 100 CAPSULE ORAL at 08:32

## 2019-03-15 RX ADMIN — ESCITALOPRAM OXALATE 20 MG: 20 TABLET, FILM COATED ORAL at 08:32

## 2019-03-15 RX ADMIN — CETIRIZINE HYDROCHLORIDE 10 MG: 10 TABLET, FILM COATED ORAL at 08:32

## 2019-03-15 RX ADMIN — FERROUS SULFATE TAB 325 MG (65 MG ELEMENTAL FE) 325 MG: 325 (65 FE) TAB at 08:32

## 2019-03-15 RX ADMIN — GUAIFENESIN AND DEXTROMETHORPHAN 5 ML: 100; 10 SYRUP ORAL at 23:24

## 2019-03-15 RX ADMIN — MICONAZOLE NITRATE: 2 POWDER TOPICAL at 08:32

## 2019-03-15 RX ADMIN — ISOSORBIDE DINITRATE 10 MG: 10 TABLET ORAL at 21:19

## 2019-03-15 RX ADMIN — PANCRELIPASE LIPASE, PANCRELIPASE PROTEASE, PANCRELIPASE AMYLASE 1 CAPSULE: 10000; 32000; 42000 CAPSULE, DELAYED RELEASE ORAL at 08:32

## 2019-03-15 RX ADMIN — Medication 2 PUFF: at 09:00

## 2019-03-15 RX ADMIN — OXYCODONE AND ACETAMINOPHEN 1 TABLET: 5; 325 TABLET ORAL at 01:54

## 2019-03-15 RX ADMIN — PANCRELIPASE LIPASE, PANCRELIPASE PROTEASE, PANCRELIPASE AMYLASE 1 CAPSULE: 10000; 32000; 42000 CAPSULE, DELAYED RELEASE ORAL at 17:08

## 2019-03-15 ASSESSMENT — PAIN DESCRIPTION - DESCRIPTORS
DESCRIPTORS: ACHING
DESCRIPTORS: ACHING;DISCOMFORT
DESCRIPTORS: ACHING;DISCOMFORT
DESCRIPTORS: ACHING
DESCRIPTORS_2: ACHING

## 2019-03-15 ASSESSMENT — PAIN DESCRIPTION - ORIENTATION
ORIENTATION_2: LEFT;RIGHT
ORIENTATION: LOWER

## 2019-03-15 ASSESSMENT — PAIN DESCRIPTION - ONSET
ONSET: AWAKENED FROM SLEEP
ONSET: ON-GOING
ONSET_2: ON-GOING

## 2019-03-15 ASSESSMENT — PAIN DESCRIPTION - INTENSITY: RATING_2: 7

## 2019-03-15 ASSESSMENT — PAIN DESCRIPTION - LOCATION
LOCATION: BACK
LOCATION_2: NECK
LOCATION: BACK

## 2019-03-15 ASSESSMENT — PAIN DESCRIPTION - FREQUENCY
FREQUENCY: CONTINUOUS

## 2019-03-15 ASSESSMENT — PAIN SCALES - GENERAL
PAINLEVEL_OUTOF10: 7
PAINLEVEL_OUTOF10: 0
PAINLEVEL_OUTOF10: 7
PAINLEVEL_OUTOF10: 8
PAINLEVEL_OUTOF10: 5
PAINLEVEL_OUTOF10: 0
PAINLEVEL_OUTOF10: 8
PAINLEVEL_OUTOF10: 5
PAINLEVEL_OUTOF10: 4

## 2019-03-15 ASSESSMENT — PAIN DESCRIPTION - PROGRESSION
CLINICAL_PROGRESSION_2: NOT CHANGED
CLINICAL_PROGRESSION: NOT CHANGED

## 2019-03-15 ASSESSMENT — PAIN DESCRIPTION - PAIN TYPE
TYPE: CHRONIC PAIN
TYPE: CHRONIC PAIN
TYPE_2: CHRONIC PAIN
TYPE: CHRONIC PAIN
TYPE: CHRONIC PAIN

## 2019-03-15 ASSESSMENT — PAIN - FUNCTIONAL ASSESSMENT
PAIN_FUNCTIONAL_ASSESSMENT: ACTIVITIES ARE NOT PREVENTED

## 2019-03-15 ASSESSMENT — PAIN DESCRIPTION - DURATION: DURATION_2: CONTINUOUS

## 2019-03-16 PROCEDURE — 94640 AIRWAY INHALATION TREATMENT: CPT

## 2019-03-16 PROCEDURE — 6370000000 HC RX 637 (ALT 250 FOR IP): Performed by: PSYCHIATRY & NEUROLOGY

## 2019-03-16 PROCEDURE — 1240000000 HC EMOTIONAL WELLNESS R&B

## 2019-03-16 PROCEDURE — 99231 SBSQ HOSP IP/OBS SF/LOW 25: CPT | Performed by: NURSE PRACTITIONER

## 2019-03-16 RX ADMIN — PANCRELIPASE LIPASE, PANCRELIPASE PROTEASE, PANCRELIPASE AMYLASE 1 CAPSULE: 10000; 32000; 42000 CAPSULE, DELAYED RELEASE ORAL at 12:24

## 2019-03-16 RX ADMIN — MIRTAZAPINE 15 MG: 15 TABLET, FILM COATED ORAL at 21:12

## 2019-03-16 RX ADMIN — BENZONATATE 100 MG: 100 CAPSULE ORAL at 15:19

## 2019-03-16 RX ADMIN — HYDROCHLOROTHIAZIDE 25 MG: 25 TABLET ORAL at 08:29

## 2019-03-16 RX ADMIN — BENZONATATE 100 MG: 100 CAPSULE ORAL at 21:13

## 2019-03-16 RX ADMIN — BENZONATATE 100 MG: 100 CAPSULE ORAL at 08:30

## 2019-03-16 RX ADMIN — ISOSORBIDE DINITRATE 10 MG: 10 TABLET ORAL at 08:29

## 2019-03-16 RX ADMIN — PANCRELIPASE LIPASE, PANCRELIPASE PROTEASE, PANCRELIPASE AMYLASE 1 CAPSULE: 10000; 32000; 42000 CAPSULE, DELAYED RELEASE ORAL at 17:54

## 2019-03-16 RX ADMIN — ESCITALOPRAM OXALATE 20 MG: 20 TABLET, FILM COATED ORAL at 08:30

## 2019-03-16 RX ADMIN — OXYCODONE AND ACETAMINOPHEN 1 TABLET: 5; 325 TABLET ORAL at 12:24

## 2019-03-16 RX ADMIN — QUETIAPINE FUMARATE 500 MG: 300 TABLET, EXTENDED RELEASE ORAL at 21:13

## 2019-03-16 RX ADMIN — HYDROXYZINE PAMOATE 25 MG: 25 CAPSULE ORAL at 20:13

## 2019-03-16 RX ADMIN — OXYCODONE AND ACETAMINOPHEN 1 TABLET: 5; 325 TABLET ORAL at 20:13

## 2019-03-16 RX ADMIN — LEVOTHYROXINE SODIUM 75 MCG: 75 TABLET ORAL at 04:53

## 2019-03-16 RX ADMIN — ISOSORBIDE DINITRATE 10 MG: 10 TABLET ORAL at 21:13

## 2019-03-16 RX ADMIN — PANCRELIPASE LIPASE, PANCRELIPASE PROTEASE, PANCRELIPASE AMYLASE 1 CAPSULE: 10000; 32000; 42000 CAPSULE, DELAYED RELEASE ORAL at 08:42

## 2019-03-16 RX ADMIN — MICONAZOLE NITRATE: 2 POWDER TOPICAL at 21:13

## 2019-03-16 RX ADMIN — TRAZODONE HYDROCHLORIDE 50 MG: 50 TABLET ORAL at 21:13

## 2019-03-16 RX ADMIN — ISOSORBIDE DINITRATE 10 MG: 10 TABLET ORAL at 15:19

## 2019-03-16 RX ADMIN — LISINOPRIL 2.5 MG: 2.5 TABLET ORAL at 08:36

## 2019-03-16 RX ADMIN — CARVEDILOL 3.12 MG: 3.12 TABLET, FILM COATED ORAL at 08:36

## 2019-03-16 RX ADMIN — CLOPIDOGREL BISULFATE 75 MG: 75 TABLET, FILM COATED ORAL at 08:27

## 2019-03-16 RX ADMIN — CETIRIZINE HYDROCHLORIDE 10 MG: 10 TABLET, FILM COATED ORAL at 08:30

## 2019-03-16 RX ADMIN — CARVEDILOL 3.12 MG: 3.12 TABLET, FILM COATED ORAL at 17:54

## 2019-03-16 RX ADMIN — MICONAZOLE NITRATE: 2 POWDER TOPICAL at 08:26

## 2019-03-16 RX ADMIN — PANTOPRAZOLE SODIUM 40 MG: 40 TABLET, DELAYED RELEASE ORAL at 08:27

## 2019-03-16 RX ADMIN — HYDROCHLOROTHIAZIDE 25 MG: 25 TABLET ORAL at 21:13

## 2019-03-16 RX ADMIN — FLUTICASONE PROPIONATE 1 SPRAY: 50 SPRAY, METERED NASAL at 08:26

## 2019-03-16 RX ADMIN — OXYCODONE AND ACETAMINOPHEN 1 TABLET: 5; 325 TABLET ORAL at 04:53

## 2019-03-16 RX ADMIN — Medication 2 PUFF: at 19:29

## 2019-03-16 RX ADMIN — GUAIFENESIN AND DEXTROMETHORPHAN 5 ML: 100; 10 SYRUP ORAL at 08:26

## 2019-03-16 RX ADMIN — FERROUS SULFATE TAB 325 MG (65 MG ELEMENTAL FE) 325 MG: 325 (65 FE) TAB at 08:30

## 2019-03-16 RX ADMIN — ARIPIPRAZOLE 2 MG: 2 TABLET ORAL at 08:30

## 2019-03-16 ASSESSMENT — PAIN DESCRIPTION - LOCATION
LOCATION: BACK
LOCATION_2: NECK
LOCATION: BACK

## 2019-03-16 ASSESSMENT — PAIN DESCRIPTION - FREQUENCY
FREQUENCY: CONTINUOUS
FREQUENCY: INTERMITTENT

## 2019-03-16 ASSESSMENT — PAIN DESCRIPTION - PROGRESSION
CLINICAL_PROGRESSION_2: NOT CHANGED
CLINICAL_PROGRESSION: NOT CHANGED
CLINICAL_PROGRESSION: NOT CHANGED

## 2019-03-16 ASSESSMENT — PAIN DESCRIPTION - ONSET
ONSET: ON-GOING
ONSET_2: ON-GOING
ONSET: ON-GOING

## 2019-03-16 ASSESSMENT — PAIN DESCRIPTION - PAIN TYPE
TYPE_2: CHRONIC PAIN
TYPE: CHRONIC PAIN
TYPE: CHRONIC PAIN

## 2019-03-16 ASSESSMENT — PAIN DESCRIPTION - INTENSITY: RATING_2: 7

## 2019-03-16 ASSESSMENT — PAIN DESCRIPTION - ORIENTATION
ORIENTATION: LOWER
ORIENTATION: MID
ORIENTATION_2: LEFT;RIGHT

## 2019-03-16 ASSESSMENT — PAIN SCALES - GENERAL
PAINLEVEL_OUTOF10: 7
PAINLEVEL_OUTOF10: 7
PAINLEVEL_OUTOF10: 5
PAINLEVEL_OUTOF10: 7
PAINLEVEL_OUTOF10: 0
PAINLEVEL_OUTOF10: 3
PAINLEVEL_OUTOF10: 7

## 2019-03-16 ASSESSMENT — PAIN DESCRIPTION - DESCRIPTORS
DESCRIPTORS: ACHING
DESCRIPTORS_2: ACHING
DESCRIPTORS: ACHING

## 2019-03-16 ASSESSMENT — PAIN DESCRIPTION - DURATION: DURATION_2: CONTINUOUS

## 2019-03-17 PROCEDURE — 99231 SBSQ HOSP IP/OBS SF/LOW 25: CPT | Performed by: NURSE PRACTITIONER

## 2019-03-17 PROCEDURE — 6370000000 HC RX 637 (ALT 250 FOR IP): Performed by: PSYCHIATRY & NEUROLOGY

## 2019-03-17 PROCEDURE — 94640 AIRWAY INHALATION TREATMENT: CPT

## 2019-03-17 PROCEDURE — 1240000000 HC EMOTIONAL WELLNESS R&B

## 2019-03-17 RX ADMIN — HYDROXYZINE PAMOATE 25 MG: 25 CAPSULE ORAL at 08:54

## 2019-03-17 RX ADMIN — TRAZODONE HYDROCHLORIDE 50 MG: 50 TABLET ORAL at 20:47

## 2019-03-17 RX ADMIN — TIOTROPIUM BROMIDE 18 MCG: 18 CAPSULE ORAL; RESPIRATORY (INHALATION) at 07:31

## 2019-03-17 RX ADMIN — CLOPIDOGREL BISULFATE 75 MG: 75 TABLET, FILM COATED ORAL at 08:54

## 2019-03-17 RX ADMIN — MICONAZOLE NITRATE: 2 POWDER TOPICAL at 12:05

## 2019-03-17 RX ADMIN — FLUTICASONE PROPIONATE 1 SPRAY: 50 SPRAY, METERED NASAL at 08:54

## 2019-03-17 RX ADMIN — PANCRELIPASE LIPASE, PANCRELIPASE PROTEASE, PANCRELIPASE AMYLASE 1 CAPSULE: 10000; 32000; 42000 CAPSULE, DELAYED RELEASE ORAL at 12:05

## 2019-03-17 RX ADMIN — CETIRIZINE HYDROCHLORIDE 10 MG: 10 TABLET, FILM COATED ORAL at 08:55

## 2019-03-17 RX ADMIN — ISOSORBIDE DINITRATE 10 MG: 10 TABLET ORAL at 14:30

## 2019-03-17 RX ADMIN — PANTOPRAZOLE SODIUM 40 MG: 40 TABLET, DELAYED RELEASE ORAL at 08:54

## 2019-03-17 RX ADMIN — OXYCODONE AND ACETAMINOPHEN 1 TABLET: 5; 325 TABLET ORAL at 15:37

## 2019-03-17 RX ADMIN — ISOSORBIDE DINITRATE 10 MG: 10 TABLET ORAL at 20:47

## 2019-03-17 RX ADMIN — PANCRELIPASE LIPASE, PANCRELIPASE PROTEASE, PANCRELIPASE AMYLASE 1 CAPSULE: 10000; 32000; 42000 CAPSULE, DELAYED RELEASE ORAL at 08:54

## 2019-03-17 RX ADMIN — BENZONATATE 100 MG: 100 CAPSULE ORAL at 08:55

## 2019-03-17 RX ADMIN — GUAIFENESIN AND DEXTROMETHORPHAN 5 ML: 100; 10 SYRUP ORAL at 19:43

## 2019-03-17 RX ADMIN — LISINOPRIL 2.5 MG: 2.5 TABLET ORAL at 08:55

## 2019-03-17 RX ADMIN — MICONAZOLE NITRATE: 2 POWDER TOPICAL at 15:37

## 2019-03-17 RX ADMIN — CARVEDILOL 3.12 MG: 3.12 TABLET, FILM COATED ORAL at 08:54

## 2019-03-17 RX ADMIN — HYDROCHLOROTHIAZIDE 25 MG: 25 TABLET ORAL at 08:55

## 2019-03-17 RX ADMIN — OXYCODONE AND ACETAMINOPHEN 1 TABLET: 5; 325 TABLET ORAL at 08:54

## 2019-03-17 RX ADMIN — BENZONATATE 100 MG: 100 CAPSULE ORAL at 20:47

## 2019-03-17 RX ADMIN — Medication 2 PUFF: at 07:32

## 2019-03-17 RX ADMIN — HYDROXYZINE PAMOATE 25 MG: 25 CAPSULE ORAL at 20:47

## 2019-03-17 RX ADMIN — MIRTAZAPINE 15 MG: 15 TABLET, FILM COATED ORAL at 20:47

## 2019-03-17 RX ADMIN — ESCITALOPRAM OXALATE 20 MG: 20 TABLET, FILM COATED ORAL at 08:55

## 2019-03-17 RX ADMIN — MICONAZOLE NITRATE: 2 POWDER TOPICAL at 20:48

## 2019-03-17 RX ADMIN — ISOSORBIDE DINITRATE 10 MG: 10 TABLET ORAL at 08:55

## 2019-03-17 RX ADMIN — PANCRELIPASE LIPASE, PANCRELIPASE PROTEASE, PANCRELIPASE AMYLASE 1 CAPSULE: 10000; 32000; 42000 CAPSULE, DELAYED RELEASE ORAL at 17:17

## 2019-03-17 RX ADMIN — LEVOTHYROXINE SODIUM 150 MCG: 150 TABLET ORAL at 06:16

## 2019-03-17 RX ADMIN — OXYCODONE AND ACETAMINOPHEN 1 TABLET: 5; 325 TABLET ORAL at 21:55

## 2019-03-17 RX ADMIN — CARVEDILOL 3.12 MG: 3.12 TABLET, FILM COATED ORAL at 17:17

## 2019-03-17 RX ADMIN — BENZONATATE 100 MG: 100 CAPSULE ORAL at 14:30

## 2019-03-17 RX ADMIN — QUETIAPINE FUMARATE 500 MG: 300 TABLET, EXTENDED RELEASE ORAL at 20:47

## 2019-03-17 RX ADMIN — HYDROCHLOROTHIAZIDE 25 MG: 25 TABLET ORAL at 20:47

## 2019-03-17 RX ADMIN — ARIPIPRAZOLE 2 MG: 2 TABLET ORAL at 08:54

## 2019-03-17 ASSESSMENT — PAIN DESCRIPTION - LOCATION: LOCATION: BACK;NECK

## 2019-03-17 ASSESSMENT — PAIN SCALES - GENERAL
PAINLEVEL_OUTOF10: 0
PAINLEVEL_OUTOF10: 8
PAINLEVEL_OUTOF10: 4

## 2019-03-17 ASSESSMENT — PAIN DESCRIPTION - FREQUENCY: FREQUENCY: CONTINUOUS

## 2019-03-17 ASSESSMENT — PAIN - FUNCTIONAL ASSESSMENT: PAIN_FUNCTIONAL_ASSESSMENT: ACTIVITIES ARE NOT PREVENTED

## 2019-03-17 ASSESSMENT — PAIN DESCRIPTION - PROGRESSION: CLINICAL_PROGRESSION: NOT CHANGED

## 2019-03-17 ASSESSMENT — PAIN DESCRIPTION - ONSET: ONSET: ON-GOING

## 2019-03-17 ASSESSMENT — PAIN DESCRIPTION - DESCRIPTORS: DESCRIPTORS: ACHING

## 2019-03-17 ASSESSMENT — PAIN DESCRIPTION - PAIN TYPE: TYPE: CHRONIC PAIN

## 2019-03-17 ASSESSMENT — PAIN DESCRIPTION - ORIENTATION: ORIENTATION: LOWER

## 2019-03-18 PROCEDURE — 90833 PSYTX W PT W E/M 30 MIN: CPT | Performed by: PSYCHIATRY & NEUROLOGY

## 2019-03-18 PROCEDURE — 94640 AIRWAY INHALATION TREATMENT: CPT

## 2019-03-18 PROCEDURE — 99231 SBSQ HOSP IP/OBS SF/LOW 25: CPT | Performed by: PSYCHIATRY & NEUROLOGY

## 2019-03-18 PROCEDURE — 6370000000 HC RX 637 (ALT 250 FOR IP): Performed by: PSYCHIATRY & NEUROLOGY

## 2019-03-18 PROCEDURE — 1240000000 HC EMOTIONAL WELLNESS R&B

## 2019-03-18 RX ADMIN — ISOSORBIDE DINITRATE 10 MG: 10 TABLET ORAL at 20:57

## 2019-03-18 RX ADMIN — PANCRELIPASE LIPASE, PANCRELIPASE PROTEASE, PANCRELIPASE AMYLASE 1 CAPSULE: 10000; 32000; 42000 CAPSULE, DELAYED RELEASE ORAL at 12:41

## 2019-03-18 RX ADMIN — ISOSORBIDE DINITRATE 10 MG: 10 TABLET ORAL at 08:45

## 2019-03-18 RX ADMIN — OXYCODONE AND ACETAMINOPHEN 1 TABLET: 5; 325 TABLET ORAL at 14:53

## 2019-03-18 RX ADMIN — ISOSORBIDE DINITRATE 10 MG: 10 TABLET ORAL at 14:53

## 2019-03-18 RX ADMIN — MIRTAZAPINE 15 MG: 15 TABLET, FILM COATED ORAL at 20:57

## 2019-03-18 RX ADMIN — FERROUS SULFATE TAB 325 MG (65 MG ELEMENTAL FE) 325 MG: 325 (65 FE) TAB at 08:45

## 2019-03-18 RX ADMIN — PANCRELIPASE LIPASE, PANCRELIPASE PROTEASE, PANCRELIPASE AMYLASE 1 CAPSULE: 10000; 32000; 42000 CAPSULE, DELAYED RELEASE ORAL at 08:45

## 2019-03-18 RX ADMIN — FLUTICASONE PROPIONATE 1 SPRAY: 50 SPRAY, METERED NASAL at 08:45

## 2019-03-18 RX ADMIN — PANTOPRAZOLE SODIUM 40 MG: 40 TABLET, DELAYED RELEASE ORAL at 08:47

## 2019-03-18 RX ADMIN — CARVEDILOL 3.12 MG: 3.12 TABLET, FILM COATED ORAL at 08:45

## 2019-03-18 RX ADMIN — BENZONATATE 100 MG: 100 CAPSULE ORAL at 14:53

## 2019-03-18 RX ADMIN — BENZONATATE 100 MG: 100 CAPSULE ORAL at 20:57

## 2019-03-18 RX ADMIN — OXYCODONE AND ACETAMINOPHEN 1 TABLET: 5; 325 TABLET ORAL at 08:47

## 2019-03-18 RX ADMIN — LEVOTHYROXINE SODIUM 75 MCG: 75 TABLET ORAL at 08:45

## 2019-03-18 RX ADMIN — HYDROXYZINE PAMOATE 25 MG: 25 CAPSULE ORAL at 20:04

## 2019-03-18 RX ADMIN — CETIRIZINE HYDROCHLORIDE 10 MG: 10 TABLET, FILM COATED ORAL at 08:45

## 2019-03-18 RX ADMIN — MICONAZOLE NITRATE: 2 POWDER TOPICAL at 20:57

## 2019-03-18 RX ADMIN — CLOPIDOGREL BISULFATE 75 MG: 75 TABLET, FILM COATED ORAL at 08:47

## 2019-03-18 RX ADMIN — Medication 2 PUFF: at 09:22

## 2019-03-18 RX ADMIN — HYDROCHLOROTHIAZIDE 25 MG: 25 TABLET ORAL at 08:45

## 2019-03-18 RX ADMIN — ESCITALOPRAM OXALATE 20 MG: 20 TABLET, FILM COATED ORAL at 08:45

## 2019-03-18 RX ADMIN — CARVEDILOL 3.12 MG: 3.12 TABLET, FILM COATED ORAL at 18:51

## 2019-03-18 RX ADMIN — BENZONATATE 100 MG: 100 CAPSULE ORAL at 08:45

## 2019-03-18 RX ADMIN — QUETIAPINE FUMARATE 500 MG: 300 TABLET, EXTENDED RELEASE ORAL at 20:57

## 2019-03-18 RX ADMIN — HYDROCHLOROTHIAZIDE 25 MG: 25 TABLET ORAL at 20:57

## 2019-03-18 RX ADMIN — LISINOPRIL 2.5 MG: 2.5 TABLET ORAL at 08:45

## 2019-03-18 RX ADMIN — GUAIFENESIN AND DEXTROMETHORPHAN 5 ML: 100; 10 SYRUP ORAL at 08:47

## 2019-03-18 RX ADMIN — Medication 2 PUFF: at 21:04

## 2019-03-18 RX ADMIN — Medication 2 PUFF: at 13:04

## 2019-03-18 RX ADMIN — ARIPIPRAZOLE 2 MG: 2 TABLET ORAL at 08:45

## 2019-03-18 RX ADMIN — TIOTROPIUM BROMIDE 18 MCG: 18 CAPSULE ORAL; RESPIRATORY (INHALATION) at 09:22

## 2019-03-18 RX ADMIN — PANCRELIPASE LIPASE, PANCRELIPASE PROTEASE, PANCRELIPASE AMYLASE 1 CAPSULE: 10000; 32000; 42000 CAPSULE, DELAYED RELEASE ORAL at 18:54

## 2019-03-18 RX ADMIN — MICONAZOLE NITRATE: 2 POWDER TOPICAL at 08:54

## 2019-03-18 RX ADMIN — OXYCODONE AND ACETAMINOPHEN 1 TABLET: 5; 325 TABLET ORAL at 20:59

## 2019-03-18 RX ADMIN — HYDROXYZINE PAMOATE 25 MG: 25 CAPSULE ORAL at 08:47

## 2019-03-18 RX ADMIN — TRAZODONE HYDROCHLORIDE 50 MG: 50 TABLET ORAL at 20:59

## 2019-03-18 ASSESSMENT — PAIN DESCRIPTION - ORIENTATION
ORIENTATION_2: RIGHT;LEFT
ORIENTATION: UPPER;MID
ORIENTATION: LOWER

## 2019-03-18 ASSESSMENT — PAIN SCALES - GENERAL
PAINLEVEL_OUTOF10: 0
PAINLEVEL_OUTOF10: 8
PAINLEVEL_OUTOF10: 9
PAINLEVEL_OUTOF10: 9
PAINLEVEL_OUTOF10: 0
PAINLEVEL_OUTOF10: 5

## 2019-03-18 ASSESSMENT — PAIN DESCRIPTION - ONSET
ONSET: ON-GOING
ONSET_2: ON-GOING
ONSET: ON-GOING

## 2019-03-18 ASSESSMENT — PAIN DESCRIPTION - PAIN TYPE
TYPE: CHRONIC PAIN
TYPE_2: CHRONIC PAIN
TYPE: CHRONIC PAIN

## 2019-03-18 ASSESSMENT — PAIN DESCRIPTION - LOCATION
LOCATION_2: LEG
LOCATION: BACK;NECK
LOCATION: BACK

## 2019-03-18 ASSESSMENT — PAIN DESCRIPTION - DESCRIPTORS
DESCRIPTORS: ACHING
DESCRIPTORS_2: ACHING;BURNING
DESCRIPTORS: ACHING

## 2019-03-18 ASSESSMENT — PAIN DESCRIPTION - FREQUENCY
FREQUENCY: CONTINUOUS
FREQUENCY: CONTINUOUS

## 2019-03-18 ASSESSMENT — PAIN DESCRIPTION - INTENSITY: RATING_2: 8

## 2019-03-18 ASSESSMENT — PAIN DESCRIPTION - DURATION: DURATION_2: CONTINUOUS

## 2019-03-18 ASSESSMENT — PAIN - FUNCTIONAL ASSESSMENT
PAIN_FUNCTIONAL_ASSESSMENT: PREVENTS OR INTERFERES SOME ACTIVE ACTIVITIES AND ADLS
PAIN_FUNCTIONAL_ASSESSMENT: ACTIVITIES ARE NOT PREVENTED

## 2019-03-18 ASSESSMENT — PAIN DESCRIPTION - DIRECTION: RADIATING_TOWARDS: NECK

## 2019-03-19 VITALS
HEIGHT: 64 IN | RESPIRATION RATE: 16 BRPM | HEART RATE: 104 BPM | DIASTOLIC BLOOD PRESSURE: 83 MMHG | TEMPERATURE: 97 F | BODY MASS INDEX: 29.02 KG/M2 | SYSTOLIC BLOOD PRESSURE: 129 MMHG | WEIGHT: 170 LBS | OXYGEN SATURATION: 94 %

## 2019-03-19 PROCEDURE — 6370000000 HC RX 637 (ALT 250 FOR IP): Performed by: PHYSICIAN ASSISTANT

## 2019-03-19 PROCEDURE — 94640 AIRWAY INHALATION TREATMENT: CPT

## 2019-03-19 PROCEDURE — 99239 HOSP IP/OBS DSCHRG MGMT >30: CPT | Performed by: PSYCHIATRY & NEUROLOGY

## 2019-03-19 PROCEDURE — 5130000000 HC BRIDGE APPOINTMENT

## 2019-03-19 PROCEDURE — 6370000000 HC RX 637 (ALT 250 FOR IP): Performed by: PSYCHIATRY & NEUROLOGY

## 2019-03-19 RX ORDER — MIRTAZAPINE 15 MG/1
15 TABLET, FILM COATED ORAL NIGHTLY
Qty: 30 TABLET | Refills: 0 | Status: ON HOLD | OUTPATIENT
Start: 2019-03-19 | End: 2019-05-23 | Stop reason: HOSPADM

## 2019-03-19 RX ORDER — ARIPIPRAZOLE 2 MG/1
2 TABLET ORAL DAILY
Qty: 30 TABLET | Refills: 0 | Status: ON HOLD | OUTPATIENT
Start: 2019-03-19 | End: 2019-05-23 | Stop reason: HOSPADM

## 2019-03-19 RX ADMIN — LEVOTHYROXINE SODIUM 75 MCG: 75 TABLET ORAL at 06:10

## 2019-03-19 RX ADMIN — BENZONATATE 100 MG: 100 CAPSULE ORAL at 08:49

## 2019-03-19 RX ADMIN — ISOSORBIDE DINITRATE 10 MG: 10 TABLET ORAL at 08:49

## 2019-03-19 RX ADMIN — FLUTICASONE PROPIONATE 1 SPRAY: 50 SPRAY, METERED NASAL at 08:49

## 2019-03-19 RX ADMIN — OXYCODONE AND ACETAMINOPHEN 1 TABLET: 5; 325 TABLET ORAL at 12:14

## 2019-03-19 RX ADMIN — HYDROCHLOROTHIAZIDE 25 MG: 25 TABLET ORAL at 08:49

## 2019-03-19 RX ADMIN — CLOPIDOGREL BISULFATE 75 MG: 75 TABLET, FILM COATED ORAL at 08:49

## 2019-03-19 RX ADMIN — PANTOPRAZOLE SODIUM 40 MG: 40 TABLET, DELAYED RELEASE ORAL at 06:11

## 2019-03-19 RX ADMIN — MICONAZOLE NITRATE: 2 POWDER TOPICAL at 08:49

## 2019-03-19 RX ADMIN — TIOTROPIUM BROMIDE 18 MCG: 18 CAPSULE ORAL; RESPIRATORY (INHALATION) at 10:24

## 2019-03-19 RX ADMIN — CARVEDILOL 3.12 MG: 3.12 TABLET, FILM COATED ORAL at 08:49

## 2019-03-19 RX ADMIN — PANCRELIPASE LIPASE, PANCRELIPASE PROTEASE, PANCRELIPASE AMYLASE 1 CAPSULE: 10000; 32000; 42000 CAPSULE, DELAYED RELEASE ORAL at 08:49

## 2019-03-19 RX ADMIN — CETIRIZINE HYDROCHLORIDE 10 MG: 10 TABLET, FILM COATED ORAL at 08:49

## 2019-03-19 RX ADMIN — LISINOPRIL 2.5 MG: 2.5 TABLET ORAL at 08:49

## 2019-03-19 RX ADMIN — OXYCODONE AND ACETAMINOPHEN 1 TABLET: 5; 325 TABLET ORAL at 06:13

## 2019-03-19 RX ADMIN — GUAIFENESIN AND DEXTROMETHORPHAN 5 ML: 100; 10 SYRUP ORAL at 08:49

## 2019-03-19 RX ADMIN — Medication 2 PUFF: at 10:23

## 2019-03-19 RX ADMIN — ESCITALOPRAM OXALATE 20 MG: 20 TABLET, FILM COATED ORAL at 08:49

## 2019-03-19 RX ADMIN — ARIPIPRAZOLE 2 MG: 2 TABLET ORAL at 08:49

## 2019-03-19 RX ADMIN — HYDROXYZINE PAMOATE 25 MG: 25 CAPSULE ORAL at 08:49

## 2019-03-19 RX ADMIN — PANCRELIPASE LIPASE, PANCRELIPASE PROTEASE, PANCRELIPASE AMYLASE 1 CAPSULE: 10000; 32000; 42000 CAPSULE, DELAYED RELEASE ORAL at 12:12

## 2019-03-19 ASSESSMENT — PAIN DESCRIPTION - ORIENTATION: ORIENTATION: UPPER;MID

## 2019-03-19 ASSESSMENT — PAIN SCALES - GENERAL
PAINLEVEL_OUTOF10: 8
PAINLEVEL_OUTOF10: 4

## 2019-03-19 ASSESSMENT — PAIN DESCRIPTION - LOCATION: LOCATION: BACK

## 2019-03-19 ASSESSMENT — PAIN DESCRIPTION - PROGRESSION: CLINICAL_PROGRESSION: NOT CHANGED

## 2019-03-19 ASSESSMENT — PAIN DESCRIPTION - ONSET: ONSET: ON-GOING

## 2019-03-19 ASSESSMENT — PAIN DESCRIPTION - DESCRIPTORS: DESCRIPTORS: ACHING

## 2019-03-19 ASSESSMENT — PAIN DESCRIPTION - FREQUENCY: FREQUENCY: CONTINUOUS

## 2019-03-19 ASSESSMENT — PAIN - FUNCTIONAL ASSESSMENT: PAIN_FUNCTIONAL_ASSESSMENT: ACTIVITIES ARE NOT PREVENTED

## 2019-03-19 ASSESSMENT — PAIN DESCRIPTION - DIRECTION: RADIATING_TOWARDS: NECK

## 2019-03-19 ASSESSMENT — PAIN DESCRIPTION - PAIN TYPE: TYPE: CHRONIC PAIN

## 2019-03-20 ENCOUNTER — TELEPHONE (OUTPATIENT)
Dept: ADMINISTRATIVE | Age: 64
End: 2019-03-20

## 2019-04-01 ENCOUNTER — APPOINTMENT (OUTPATIENT)
Dept: GENERAL RADIOLOGY | Age: 64
End: 2019-04-01
Payer: COMMERCIAL

## 2019-04-01 ENCOUNTER — APPOINTMENT (OUTPATIENT)
Dept: CT IMAGING | Age: 64
End: 2019-04-01
Payer: COMMERCIAL

## 2019-04-01 ENCOUNTER — HOSPITAL ENCOUNTER (EMERGENCY)
Age: 64
Discharge: HOME | End: 2019-04-01
Attending: EMERGENCY MEDICINE
Payer: COMMERCIAL

## 2019-04-01 VITALS
BODY MASS INDEX: 29.02 KG/M2 | TEMPERATURE: 98.1 F | SYSTOLIC BLOOD PRESSURE: 122 MMHG | DIASTOLIC BLOOD PRESSURE: 76 MMHG | RESPIRATION RATE: 18 BRPM | HEIGHT: 64 IN | OXYGEN SATURATION: 96 % | HEART RATE: 71 BPM | WEIGHT: 170 LBS

## 2019-04-01 DIAGNOSIS — S40.011A CONTUSION OF RIGHT SCAPULA, INITIAL ENCOUNTER: ICD-10-CM

## 2019-04-01 DIAGNOSIS — S20.211A RIB CONTUSION, RIGHT, INITIAL ENCOUNTER: ICD-10-CM
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
## 2019-04-01 DIAGNOSIS — S46.911A STRAIN OF RIGHT SHOULDER, INITIAL ENCOUNTER: ICD-10-CM

## 2019-04-01 DIAGNOSIS — J44.9 CHRONIC OBSTRUCTIVE PULMONARY DISEASE, UNSPECIFIED COPD TYPE (HCC): ICD-10-CM

## 2019-04-01 PROCEDURE — 71100 X-RAY EXAM RIBS UNI 2 VIEWS: CPT

## 2019-04-01 PROCEDURE — 6370000000 HC RX 637 (ALT 250 FOR IP)

## 2019-04-01 PROCEDURE — 2709999900 HC NON-CHARGEABLE SUPPLY

## 2019-04-01 PROCEDURE — 94640 AIRWAY INHALATION TREATMENT: CPT

## 2019-04-01 PROCEDURE — 99284 EMERGENCY DEPT VISIT MOD MDM: CPT

## 2019-04-01 PROCEDURE — 72125 CT NECK SPINE W/O DYE: CPT

## 2019-04-01 PROCEDURE — 6370000000 HC RX 637 (ALT 250 FOR IP): Performed by: EMERGENCY MEDICINE

## 2019-04-01 PROCEDURE — 73030 X-RAY EXAM OF SHOULDER: CPT

## 2019-04-01 RX ORDER — HYDROCODONE BITARTRATE AND ACETAMINOPHEN 5; 325 MG/1; MG/1
1 TABLET ORAL ONCE
Status: COMPLETED | OUTPATIENT
Start: 2019-04-01 | End: 2019-04-01

## 2019-04-01 RX ORDER — NABUMETONE 500 MG/1
500 TABLET, FILM COATED ORAL 2 TIMES DAILY
Qty: 30 TABLET | Refills: 0 | Status: SHIPPED | OUTPATIENT
Start: 2019-04-01 | End: 2019-12-11

## 2019-04-01 RX ORDER — HYDROCODONE BITARTRATE AND ACETAMINOPHEN 5; 325 MG/1; MG/1
TABLET ORAL
Status: COMPLETED
Start: 2019-04-01 | End: 2019-04-01

## 2019-04-01 RX ORDER — IPRATROPIUM BROMIDE AND ALBUTEROL SULFATE 2.5; .5 MG/3ML; MG/3ML
1 SOLUTION RESPIRATORY (INHALATION) ONCE
Status: COMPLETED | OUTPATIENT
Start: 2019-04-01 | End: 2019-04-01

## 2019-04-01 RX ADMIN — IPRATROPIUM BROMIDE AND ALBUTEROL SULFATE 1 AMPULE: .5; 3 SOLUTION RESPIRATORY (INHALATION) at 20:05

## 2019-04-01 RX ADMIN — HYDROCODONE BITARTRATE AND ACETAMINOPHEN 1 TABLET: 5; 325 TABLET ORAL at 19:26

## 2019-04-01 ASSESSMENT — PAIN DESCRIPTION - LOCATION: LOCATION: HEAD;NECK

## 2019-04-01 ASSESSMENT — ENCOUNTER SYMPTOMS
SORE THROAT: 0
EYE DISCHARGE: 0
COUGH: 0
EYE PAIN: 0
BACK PAIN: 0
SHORTNESS OF BREATH: 0
NAUSEA: 0
WHEEZING: 0
DIARRHEA: 0
VOMITING: 0
ABDOMINAL PAIN: 0
RHINORRHEA: 0

## 2019-04-01 ASSESSMENT — PAIN DESCRIPTION - DESCRIPTORS: DESCRIPTORS: ACHING

## 2019-04-01 ASSESSMENT — PAIN DESCRIPTION - PAIN TYPE: TYPE: ACUTE PAIN

## 2019-04-01 ASSESSMENT — PAIN SCALES - GENERAL
PAINLEVEL_OUTOF10: 10
PAINLEVEL_OUTOF10: 10

## 2019-04-01 ASSESSMENT — PAIN DESCRIPTION - ORIENTATION: ORIENTATION: RIGHT

## 2019-04-01 NOTE — ED PROVIDER NOTES
[facility]  eMERGENCY dEPARTMENTeNCOUnter        CHIEF COMPLAINT    Chief Complaint   Patient presents with   [address] Head Injury       Nurses Notes reviewed and I agree except as noted in the HPI. HPI    Debora Zhang is a 66 y.o. female who presents for evaluation of fall 2 days ago. Patient states that she missed a step and fell down 8 steps landing on her right shoulder. She states that she has fallen down these stairs three other times previously. No loss of consciousness. Patient reports right shoulder pain, headache, and neck pain. Patient denies fever, chills, visual changes, weakness, or chest pain. No further complaints at initial evaluation. REVIEW OF SYSTEMS    Review of Systems   Constitutional: Negative for appetite change, chills, fatigue and fever. HENT: Negative for congestion, ear pain, rhinorrhea and sore throat. Eyes: Negative for pain, discharge and visual disturbance. Respiratory: Negative for cough, shortness of breath and wheezing. Cardiovascular: Negative for chest pain, palpitations and leg swelling. Gastrointestinal: Negative for abdominal pain, diarrhea, nausea and vomiting. Genitourinary: Negative for difficulty urinating, dysuria, hematuria and vaginal discharge. Musculoskeletal: Positive for arthralgias, myalgias and neck pain. Negative for back pain and joint swelling. Skin: Negative for pallor and rash. Neurological: Positive for headaches. Negative for dizziness, syncope, weakness, light-headedness and numbness. Hematological: Negative for adenopathy. Psychiatric/Behavioral: Negative for confusion and suicidal ideas. The patient is not nervous/anxious.         PAST MEDICAL HISTORY     has a past medical history of Allergic rhinitis, Arthritis, Bipolar disorder ([address] Cancer ([address] Colon polyps, COPD (chronic obstructive pulmonary disease) ([address] Depression, Diabetes ([address] Diverticulitis of colon, GERD R-0Print      hydrochlorothiazide (HYDRODIURIL) 25 MG tablet TAKE 1 TABLET BY MOUTH TWICE A DAY, Disp-30 tablet, R-6Maximum Refills ReachedNormal      hydrOXYzine (VISTARIL) 50 MG capsule Take 50 mg by mouth 3 times daily as needed for Itching or Anxiety Historical Med      traZODone (DESYREL) 100 MG tablet Take 2 tablets by mouth nightly, Disp-30 tablet, R-0Normal      QUEtiapine (SEROQUEL XR) 300 MG extended release tablet Take 2 tablets by mouth nightly, Disp-30 tablet, R-0Normal      !! levothyroxine (SYNTHROID) 150 MCG tablet Take 150 mcg by mouth once a week Weekly on SundayHistorical Med      RaNITidine HCl (RANITIDINE 75 PO) Take 150 mg by mouth 2 times dailyHistorical Med      !! Albuterol Sulfate (VENTOLIN HFA IN) Inhale 90 mg into the lungs 2 times dailyHistorical Med      isosorbide dinitrate (ISORDIL) 10 MG tablet TAKE 1 TABLET BY MOUTH 3 TIMES DAILY, Disp-270 tablet, R-3Maximum Refills ReachedNormal      carvedilol (COREG) 3.125 MG tablet TAKE 1 TABLET BY MOUTH 2 TIMES DAILY (WITH MEALS), Disp-180 tablet, R-3Maximum Refills ReachedNormal      hyoscyamine  MCG TBCR extended release tablet Take by mouth 2 times dailyHistorical Med      TUDORZA PRESSAIR 400 MCG/ACT AEPB inhaler 1 puff 2 times daily , R-0, DAWHistorical Med      ferrous sulfate 325 (65 FE) MG tablet Take 325 mg by mouth daily (with breakfast) Historical Med      !! levothyroxine (SYNTHROID) 75 MCG tablet Take 75 mcg by mouth Six times weekly everyday except Sunday take 150 mcg. Historical Med      pantoprazole (PROTONIX) 40 MG tablet Take 1 tablet by mouth daily Indications: Gastroesophageal Reflux Disease, Disp-10 tablet, R-0Print      !! albuterol (PROVENTIL HFA;VENTOLIN HFA) 108 (90 BASE) MCG/ACT inhaler Inhale 2 puffs into the lungs every 6 hours as needed for Wheezing Historical Med       !! - Potential duplicate medications found. Please discuss with provider.           ALLERGIES    is allergic to seasonal.    FAMILY HISTORY report electronically signed by Dr. Cristopher Hastings on 4/1/2019 7:34 PM      XR RIBS RIGHT (2 VIEWS)   Final Result   No acute rib fracture seen. **This report has been created using voice recognition software. It may contain minor errors which are inherent in voice recognition technology. **      Final report electronically signed by Dr. Cristopher Hastings on 4/1/2019 7:11 PM      XR SHOULDER RIGHT (MIN 2 VIEWS)   Final Result   No acute findings. Chronic widening right AC joint. **This report has been created using voice recognition software. It may contain minor errors which are inherent in voice recognition technology. **      Final report electronically signed by Dr. Cristopher Hastings on 4/1/2019 7:14 PM          Vitals:  Vitals:    04/01/19 1813 04/01/19 1954   BP: (!) 115/56 122/76   Pulse: 69 71   Resp: 17 18   Temp: 98.1 °F (36.7 °C)    SpO2: 96% 96%   Weight: 170 lb (77.1 kg)    Height: 5' 4\" (1.626 m)        EMERGENCY DEPARTMENT COURSE:    Medications   ipratropium-albuterol (DUONEB) nebulizer solution 1 ampule (1 ampule Inhalation Given 4/1/19 2005)   HYDROcodone-acetaminophen (NORCO) 5-325 MG per tablet 1 tablet (1 tablet Oral Given 4/1/19 1926)       The pt was seen and evaluated by me. Within the department, Iobserved the pt's vital signs to be within acceptable range. Radiological studies were performed, results were reviewed with the patient. Within the department, the pt was treated with Norco and Duoneb. I observed thept's condition to improve during the duration of their stay. I explained my proposed course of treatment to the pt, and they were amenable to my decision. They were discharged home, and they will return to the ED if theirsymptoms become more severe in nature, or otherwise change. CRITICAL CARE:   None. CONSULTS:  None    PROCEDURES:  None. FINAL IMPRESSION       1. Fall with no significant injury, initial encounter    2.  Contusion of right scapula, initial encounter    3. Rib contusion, right, initial encounter    4. Strain of right shoulder, initial encounter    5. Chronic obstructive pulmonary disease, unspecified COPD type [facility])          DISPOSITION/PLAN  Discharge  PATIENTREFERRED TO:  Sy Perry APRNILSON - [facility]  [address] [place]  [phone]    Schedule an appointment as soon as possible for a visit in 1 week      [facility] [address] [address] EMERGENCY DEPT  [address]    As needed    DISCHARGE MEDICATIONS:  Discharge Medication List as of 4/1/2019  8:07 PM      START taking these medications    Details   nabumetone (RELAFEN) 500 MG tablet Take 1 tablet by mouth 2 times daily, Disp-30 tablet, R-0Print             (Please note that portions of this note were completed with a voicerecognition program.  Efforts were made to edit the dictations but occasionally words are mis-transcribed.)      Scribe:  Ailyn De La Garza 04/01/19 6:36 PM Scribing for and in the presence of Belle Duffy M.D. Signed by: Osei Erazo 04/01/19 9:15 PM    Provider:  I personally performed the services described in the documentation, reviewed and edited thedocumentation which was dictated to the scribe in my presence, and it accurately records my words and actions.      04/01/19 9:15 PM      Catracho Pineda MD      Emergency room physician                Catracho Pineda MD  04/03/19 4990

## 2019-04-01 NOTE — ED TRIAGE NOTES
PT to ED with c/o a fall 2 days ago. PT stated fell approximately 8 steps where she hit her head. No LOC. Pt stated she is currently on Plavix. PT stated head and neck pain have increased since the fall.

## 2019-04-09 ENCOUNTER — HOSPITAL ENCOUNTER (EMERGENCY)
Age: 64
Discharge: HOME | End: 2019-04-09
Attending: EMERGENCY MEDICINE
Payer: COMMERCIAL

## 2019-04-09 ENCOUNTER — APPOINTMENT (OUTPATIENT)
Dept: GENERAL RADIOLOGY | Age: 64
End: 2019-04-09
Payer: COMMERCIAL

## 2019-04-09 VITALS
OXYGEN SATURATION: 96 % | HEART RATE: 70 BPM | SYSTOLIC BLOOD PRESSURE: 158 MMHG | WEIGHT: 171 LBS | BODY MASS INDEX: 29.19 KG/M2 | RESPIRATION RATE: 25 BRPM | TEMPERATURE: 97.9 F | DIASTOLIC BLOOD PRESSURE: 88 MMHG | HEIGHT: 64 IN

## 2019-04-09 DIAGNOSIS — M25.511 ACUTE PAIN OF RIGHT SHOULDER: ICD-10-CM

## 2019-04-09 DIAGNOSIS — S46.911A STRAIN OF RIGHT SHOULDER, INITIAL ENCOUNTER: Primary | ICD-10-CM

## 2019-04-09 LAB
ANION GAP SERPL CALCULATED.3IONS-SCNC: 13 MEQ/L (ref 8–16)
BASOPHILS # BLD: 0.7 %
BASOPHILS ABSOLUTE: 0 THOU/MM3 (ref 0–0.1)
BUN BLDV-MCNC: 14 MG/DL (ref 7–22)
CALCIUM SERPL-MCNC: 9.9 MG/DL (ref 8.5–10.5)
CHLORIDE BLD-SCNC: 96 MEQ/L (ref 98–111)
CO2: 23 MEQ/L (ref 23–33)
CREAT SERPL-MCNC: 0.9 MG/DL (ref 0.4–1.2)
EOSINOPHIL # BLD: 0.7 %
EOSINOPHILS ABSOLUTE: 0 THOU/MM3 (ref 0–0.4)
ERYTHROCYTE [DISTWIDTH] IN BLOOD BY AUTOMATED COUNT: 13.2 % (ref 11.5–14.5)
ERYTHROCYTE [DISTWIDTH] IN BLOOD BY AUTOMATED COUNT: 36.4 FL (ref 35–45)
GFR SERPL CREATININE-BSD FRML MDRD: 63 ML/MIN/1.73M2
GLUCOSE BLD-MCNC: 130 MG/DL (ref 70–108)
HCT VFR BLD CALC: 38.4 % (ref 37–47)
HEMOGLOBIN: 12.3 GM/DL (ref 12–16)
IMMATURE GRANS (ABS): 0.02 THOU/MM3 (ref 0–0.07)
IMMATURE GRANULOCYTES: 0.3 %
LYMPHOCYTES # BLD: 48.3 %
LYMPHOCYTES ABSOLUTE: 2.8 THOU/MM3 (ref 1–4.8)
MCH RBC QN AUTO: 24.6 PG (ref 26–33)
MCHC RBC AUTO-ENTMCNC: 32 GM/DL (ref 32.2–35.5)
MCV RBC AUTO: 77 FL (ref 81–99)
MONOCYTES # BLD: 5.5 %
MONOCYTES ABSOLUTE: 0.3 THOU/MM3 (ref 0.4–1.3)
NUCLEATED RED BLOOD CELLS: 0 /100 WBC
OSMOLALITY CALCULATION: 266.7 MOSMOL/KG (ref 275–300)
PLATELET # BLD: 250 THOU/MM3 (ref 130–400)
PMV BLD AUTO: 8.9 FL (ref 9.4–12.4)
POTASSIUM REFLEX MAGNESIUM: 4.1 MEQ/L (ref 3.5–5.2)
RBC # BLD: 4.99 MILL/MM3 (ref 4.2–5.4)
SEG NEUTROPHILS: 44.5 %
SEGMENTED NEUTROPHILS ABSOLUTE COUNT: 2.6 THOU/MM3 (ref 1.8–7.7)
SODIUM BLD-SCNC: 132 MEQ/L (ref 135–145)
TROPONIN T: < 0.01 NG/ML
WBC # BLD: 5.8 THOU/MM3 (ref 4.8–10.8)

## 2019-04-09 PROCEDURE — 96372 THER/PROPH/DIAG INJ SC/IM: CPT

## 2019-04-09 PROCEDURE — 6360000002 HC RX W HCPCS: Performed by: EMERGENCY MEDICINE

## 2019-04-09 PROCEDURE — 36415 COLL VENOUS BLD VENIPUNCTURE: CPT

## 2019-04-09 PROCEDURE — 84484 ASSAY OF TROPONIN QUANT: CPT

## 2019-04-09 PROCEDURE — 99284 EMERGENCY DEPT VISIT MOD MDM: CPT

## 2019-04-09 PROCEDURE — 80048 BASIC METABOLIC PNL TOTAL CA: CPT

## 2019-04-09 PROCEDURE — 6370000000 HC RX 637 (ALT 250 FOR IP): Performed by: EMERGENCY MEDICINE

## 2019-04-09 PROCEDURE — 85025 COMPLETE CBC W/AUTO DIFF WBC: CPT

## 2019-04-09 PROCEDURE — 71046 X-RAY EXAM CHEST 2 VIEWS: CPT

## 2019-04-09 RX ORDER — KETOROLAC TROMETHAMINE 30 MG/ML
15 INJECTION, SOLUTION INTRAMUSCULAR; INTRAVENOUS ONCE
Status: COMPLETED | OUTPATIENT
Start: 2019-04-09 | End: 2019-04-09

## 2019-04-09 RX ORDER — ORPHENADRINE CITRATE 30 MG/ML
60 INJECTION INTRAMUSCULAR; INTRAVENOUS ONCE
Status: DISCONTINUED | OUTPATIENT
Start: 2019-04-09 | End: 2019-04-09

## 2019-04-09 RX ORDER — MORPHINE SULFATE 2 MG/ML
2 INJECTION, SOLUTION INTRAMUSCULAR; INTRAVENOUS ONCE
Status: COMPLETED | OUTPATIENT
Start: 2019-04-09 | End: 2019-04-09

## 2019-04-09 RX ORDER — CYCLOBENZAPRINE HCL 10 MG
10 TABLET ORAL ONCE
Status: COMPLETED | OUTPATIENT
Start: 2019-04-09 | End: 2019-04-09

## 2019-04-09 RX ADMIN — KETOROLAC TROMETHAMINE 15 MG: 30 INJECTION, SOLUTION INTRAMUSCULAR; INTRAVENOUS at 16:59

## 2019-04-09 RX ADMIN — MORPHINE SULFATE 2 MG: 2 INJECTION, SOLUTION INTRAMUSCULAR; INTRAVENOUS at 18:39

## 2019-04-09 RX ADMIN — CYCLOBENZAPRINE HYDROCHLORIDE 10 MG: 10 TABLET, FILM COATED ORAL at 16:59

## 2019-04-09 ASSESSMENT — PAIN SCALES - GENERAL
PAINLEVEL_OUTOF10: 10
PAINLEVEL_OUTOF10: 10

## 2019-04-09 ASSESSMENT — ENCOUNTER SYMPTOMS
BACK PAIN: 1
WHEEZING: 0
NAUSEA: 0
ABDOMINAL PAIN: 0
SORE THROAT: 0
SHORTNESS OF BREATH: 0
COUGH: 0
DIARRHEA: 0
EYE PAIN: 0
EYE DISCHARGE: 0
VOMITING: 0
RHINORRHEA: 0

## 2019-04-09 ASSESSMENT — PAIN DESCRIPTION - DESCRIPTORS: DESCRIPTORS: ACHING

## 2019-04-09 ASSESSMENT — PAIN DESCRIPTION - PAIN TYPE: TYPE: ACUTE PAIN

## 2019-04-09 ASSESSMENT — PAIN DESCRIPTION - ORIENTATION: ORIENTATION: RIGHT

## 2019-04-09 NOTE — ED NOTES
Pt comes to the ED room 7 with a chief complaint of rt shoulder/trapezius/rt neck/rt back/pain. States has been bothering since having a fall about 2 mo ago. Denies SOB/nausea/vomit. Patient states taking Tylenol earlier today but did not help. Good radial pulse noted with good color and cap refill. Pt is alert and oriented. Dr. Victory Bumpers at the bedside with assessment and plan of care.      Shawn Carcamo RN  04/09/19 9981

## 2019-04-09 NOTE — ED PROVIDER NOTES
[facility]  eMERGENCY dEPARTMENT eNCOUnter          CHIEF COMPLAINT       Chief Complaint   Patient presents with    Shoulder Pain     right       Nurses Notes reviewed and I agree except as noted in the HPI. HISTORY OF PRESENT ILLNESS    Ian Barnhart is a 66 y.o. female who presents to the Emergency Department for the evaluation of right shoulder pain. The patient committed a fall on 3-30-19 where she fell down 8 steps injuring her head but states that no LOC occurred. Patient was seen and evaluated within the ED on 4-1-19 where a negative CT cervical spine, negative right rib, and negative right shoulder XR were completed. Patient states that she has been experiencing continual right shoulder pain which radiates to her right upper back and to the right sided of her neck causing her to return to the ED for further evaluation and work up. In addition, she states that her shoulder pain radiates distally down her RUE stopping of her right elbow. She rates her current pain as a 10/10 in severity and aching in character. She reports that her pain is exacerbated secondary to movement of the RUE. She is currently anticoagulated with Plavix. The patient reports no additional symptoms or complaints at this time. The HPI was provided by the patient. REVIEW OF SYSTEMS     Review of Systems   Constitutional: Negative for appetite change, chills, fatigue and fever. HENT: Negative for congestion, ear pain, rhinorrhea and sore throat. Eyes: Negative for pain, discharge and visual disturbance. Respiratory: Negative for cough, shortness of breath and wheezing. Cardiovascular: Negative for chest pain, palpitations and leg swelling. Gastrointestinal: Negative for abdominal pain, diarrhea, nausea and vomiting. Genitourinary: Negative for difficulty urinating, dysuria, hematuria and vaginal discharge.    Musculoskeletal: Positive for arthralgias (right shoulder), back pain (radicular pain of the right upper back), myalgias (radicular pain extending down the RUE to the right elbow) and neck pain (right sided radicular pain ). Negative for joint swelling. Skin: Negative for pallor and rash. Neurological: Negative for dizziness, syncope, weakness, light-headedness, numbness and headaches. Hematological: Negative for adenopathy. Psychiatric/Behavioral: Negative for confusion and suicidal ideas. The patient is not nervous/anxious. PAST MEDICAL HISTORY    has a past medical history of Allergic rhinitis, Arthritis, Bipolar disorder ([address] Cancer ([address] Colon polyps, COPD (chronic obstructive pulmonary disease) ([address] [facility] [address] Depression, Diabetes ([address] Diverticulitis of colon, GERD (gastroesophageal reflux disease), Hiatal hernia, History of colonoscopy, History of kidney stones, Hx of blood clots, Hyperlipidemia, Hypertension, Liver disease, Pneumonia, Suicidal thoughts, Thyroid disease, Vomiting, Wears dentures, and Wears glasses. SURGICAL HISTORY      has a past surgical history that includes Mandible fracture surgery (1984); shoulder surgery (2014); Colonoscopy (2011); Dilatation, esophagus; Elbow surgery (Right, 10/7/2004); Rotator cuff repair (2004, 2014); skin biopsy (2006); Cholecystectomy, laparoscopic (6/12/15); Elbow surgery (Bilateral, 2016); Carpal tunnel release (Bilateral, 2016); Abdomen surgery; Hysterectomy (1998); and Cardiac surgery.     CURRENT MEDICATIONS       Discharge Medication List as of 4/9/2019  6:38 PM      CONTINUE these medications which have NOT CHANGED    Details   nabumetone (RELAFEN) 500 MG tablet Take 1 tablet by mouth 2 times daily, Disp-30 tablet, R-0Print      mirtazapine (REMERON) 15 MG tablet Take 1 tablet by mouth nightly, Disp-30 tablet, R-0Normal      ARIPiprazole (ABILIFY) 2 MG tablet Take 1 tablet by mouth daily, Disp-30 tablet, R-0Normal      alosetron (LOTRONEX) 0.5 MG tablet Take 0.5 mg by mouth 2 times dailyHistorical Med lipase-protease-amylase (ZENPEP) 5000 units delayed release capsule Take 2 capsules by mouth 3 times daily (with meals) And 1 capsule with snacks, Oral, 3 TIMES DAILY WITH MEALS, Historical Med      lubiprostone (AMITIZA) 24 MCG capsule Take 24 mcg by mouth 2 times daily (with meals)Historical Med      benzonatate (TESSALON PERLES) 100 MG capsule Take 100 mg by mouth 3 times dailyHistorical Med      nystatin (MYCOSTATIN) POWD powder Apply 1 each topically 2 times dailyHistorical Med      clopidogrel (PLAVIX) 75 MG tablet TAKE 1 TABLET BY MOUTH DAILY, Disp-28 tablet, R-3Maximum Refills ReachedNormal      nystatin-triamcinolone (MYCOLOG II) 744416-7.1 UNIT/GM-% cream Apply topically 4 times daily. , Disp-2 Tube, R-1, Print      fluticasone (FLONASE) 50 MCG/ACT nasal spray 1 spray by Each Nare route daily, Disp-1 Bottle, R-0Print      hydrochlorothiazide (HYDRODIURIL) 25 MG tablet TAKE 1 TABLET BY MOUTH TWICE A DAY, Disp-30 tablet, R-6Maximum Refills ReachedNormal      hydrOXYzine (VISTARIL) 50 MG capsule Take 50 mg by mouth 3 times daily as needed for Itching or Anxiety Historical Med      traZODone (DESYREL) 100 MG tablet Take 2 tablets by mouth nightly, Disp-30 tablet, R-0Normal      QUEtiapine (SEROQUEL XR) 300 MG extended release tablet Take 2 tablets by mouth nightly, Disp-30 tablet, R-0Normal      !! levothyroxine (SYNTHROID) 150 MCG tablet Take 150 mcg by mouth once a week Weekly on SundayHistorical Med      RaNITidine HCl (RANITIDINE 75 PO) Take 150 mg by mouth 2 times dailyHistorical Med      !!  Albuterol Sulfate (VENTOLIN HFA IN) Inhale 90 mg into the lungs 2 times dailyHistorical Med      isosorbide dinitrate (ISORDIL) 10 MG tablet TAKE 1 TABLET BY MOUTH 3 TIMES DAILY, Disp-270 tablet, R-3Maximum Refills ReachedNormal      carvedilol (COREG) 3.125 MG tablet TAKE 1 TABLET BY MOUTH 2 TIMES DAILY (WITH MEALS), Disp-180 tablet, R-3Maximum Refills ReachedNormal      hyoscyamine  MCG TBCR extended release Right upper arm: She exhibits tenderness. She exhibits no bony tenderness, no swelling and no edema. Right forearm: Normal. She exhibits no tenderness. Patient has right trapezius tenderness secondary to palpation. Neurological: She is alert and oriented to person, place, and time. She exhibits normal muscle tone. Coordination normal.   Skin: Skin is warm and dry. No rash noted. She is not diaphoretic. Nursing note and vitals reviewed. DIFFERENTIALDIAGNOSIS:   Include and are not limited to: Fracture, Dislocation, Sprain, Strain    DIAGNOSTICRESULTS     EKG: All EKG's are interpreted by the Emergency Department Physician who either signs or Co-signs this chart in the absence of acardiologist.  EKG interpreted by Christen Croft DO:    Paramjit. Rate: 71 bpm  AZ interval: 158 ms  QRS duration: 86 ms  QTc: 449 ms  P-R-T axes: 67, 32, 62  No STEMI. EKG gives impression of NSR    Compared to old EKG on 3-Mar-2019    RADIOLOGY: non-plain film images(s) such as CT, Ultrasound and MRI are read by the radiologist.    XR CHEST STANDARD (2 VW)   Final Result      Possible trace right pleural effusion. No lobar consolidation. **This report has been created using voice recognition software. It may contain minor errors which are inherent in voice recognition technology. **      Final report electronically signed by Dr. Van Diez on 4/9/2019 4:57 PM          LABS:   Labs Reviewed   CBC WITH AUTO DIFFERENTIAL - Abnormal; Notable for the following components:       Result Value    MCV 77.0 (*)     MCH 24.6 (*)     MCHC 32.0 (*)     MPV 8.9 (*)     Monocytes # 0.3 (*)     All other components within normal limits   BASIC METABOLIC PANEL W/ REFLEX TO MG FOR LOW K - Abnormal; Notable for the following components:    Sodium 132 (*)     Chloride 96 (*)     Glucose 130 (*)     All other components within normal limits   GLOMERULAR FILTRATION RATE, ESTIMATED - Abnormal; Notable for the following components:    Est, Glom Filt Rate 63 (*)     All other components within normal limits   OSMOLALITY - Abnormal; Notable for the following components:    Osmolality Calc 266.7 (*)     All other components within normal limits   TROPONIN   ANION GAP       EMERGENCY DEPARTMENT COURSE:   Vitals:    Vitals:    04/09/19 1630 04/09/19 1650 04/09/19 1709   BP:  (!) 140/85 (!) 158/88   Pulse: 68 66 70   Resp:  18 25   Temp:  97.9 °F (36.6 °C)    TempSrc:  Oral    SpO2:  96% 96%   Weight:  171 lb (77.6 kg)    Height:  5' 4\" (1.626 m)        4:43 PM: The patient was seen and evaluated. MDM:  The patient was seen and evaluated within the ED today for the evaluation of right shoulder pain and radicular pain of the right upper back and RUE. The patient presented in no acute distress. Physical exam revealed diffuse tenderness along with right upper extremity and throughout the trapezius muscle. Troponin is negative. Remaining labs were reassuring. Standard chest XR reveals a possible trace right sided pleural effusion but no lobar consolidations are noted. The patient was treated with morphine, Toradol, and Flexeril while in the ED. I observed the patient's condition to remain stable during the duration of her stay. She was amenable to my decision for discharge and was sent home in stable condition. I discussed return precautions and she verbalized understanding. I recommended that she f/u with her pcp in 3-5 days for further evaluation and work up if their symptoms do not improve. I advised that she f/u with [facility] as needed. I recommended that she use OTC medications for symptomatic pain relief. All questions and concerns were addressed. CRITICAL CARE:   None     CONSULTS:  None    PROCEDURES:  None     FINAL IMPRESSION      1. Strain of right shoulder, initial encounter    2.  Acute pain of right shoulder          DISPOSITION/PLAN   Discharged    PATIENT REFERRED TO:  BA Mccoy - CNP  [address]

## 2019-04-09 NOTE — ED NOTES
Pt provided medication per order with education. Verbal understanding returned. Up to restroom without difficulty or concern. Report to Tj Watters RN at bedside.      Can Dudley RN  04/09/19 7501

## 2019-04-30 NOTE — TELEPHONE ENCOUNTER
Patient needs clearance for biolife. Forms up in Dr. Ravindra Tello box. Last OV 4/10/2018  No scheduled appt in future. Can she be cleared?
What is this?
stated

## 2019-05-01 RX ORDER — HYDROCHLOROTHIAZIDE 25 MG/1
TABLET ORAL
Qty: 60 TABLET | Refills: 0 | Status: SHIPPED | OUTPATIENT
Start: 2019-05-01 | End: 2019-05-31 | Stop reason: SDUPTHER

## 2019-05-03 ENCOUNTER — HOSPITAL ENCOUNTER (OUTPATIENT)
Dept: MRI IMAGING | Age: 64
Discharge: HOME | End: 2019-05-03
Payer: COMMERCIAL

## 2019-05-03 DIAGNOSIS — N81.6 RECTOCELE: ICD-10-CM

## 2019-05-03 DIAGNOSIS — R19.4 CHANGE IN BOWEL HABIT: ICD-10-CM

## 2019-05-03 PROCEDURE — 72195 MRI PELVIS W/O DYE: CPT

## 2019-05-05 ENCOUNTER — APPOINTMENT (OUTPATIENT)
Dept: CT IMAGING | Age: 64
End: 2019-05-05
Payer: COMMERCIAL

## 2019-05-05 ENCOUNTER — APPOINTMENT (OUTPATIENT)
Dept: GENERAL RADIOLOGY | Age: 64
End: 2019-05-05
Payer: COMMERCIAL

## 2019-05-05 ENCOUNTER — HOSPITAL ENCOUNTER (OUTPATIENT)
Age: 64
Setting detail: OBSERVATION
Discharge: HOME | End: 2019-05-06
Attending: FAMILY MEDICINE | Admitting: INTERNAL MEDICINE
Payer: COMMERCIAL

## 2019-05-05 DIAGNOSIS — R20.2 PARESTHESIAS: ICD-10-CM

## 2019-05-05 DIAGNOSIS — F05 ACUTE CONFUSIONAL STATE: Primary | ICD-10-CM

## 2019-05-05 DIAGNOSIS — N30.00 ACUTE CYSTITIS WITHOUT HEMATURIA: ICD-10-CM

## 2019-05-05 DIAGNOSIS — Z91.81 HISTORY OF FALL: ICD-10-CM

## 2019-05-05 PROBLEM — R29.90 STROKE-LIKE SYMPTOM: Status: ACTIVE | Noted: 2019-05-05

## 2019-05-05 LAB
ALBUMIN SERPL-MCNC: 4 G/DL (ref 3.5–5.1)
ALP BLD-CCNC: 46 U/L (ref 38–126)
ALT SERPL-CCNC: 21 U/L (ref 11–66)
AMPHETAMINE+METHAMPHETAMINE URINE SCREEN: NEGATIVE
ANION GAP SERPL CALCULATED.3IONS-SCNC: 12 MEQ/L (ref 8–16)
APTT: 41.4 SECONDS (ref 22–38)
AST SERPL-CCNC: 24 U/L (ref 5–40)
BACTERIA: ABNORMAL
BARBITURATE QUANTITATIVE URINE: NEGATIVE
BASOPHILS # BLD: 0.6 %
BASOPHILS ABSOLUTE: 0.1 THOU/MM3 (ref 0–0.1)
BENZODIAZEPINE QUANTITATIVE URINE: NEGATIVE
BILIRUB SERPL-MCNC: 0.3 MG/DL (ref 0.3–1.2)
BILIRUBIN URINE: NEGATIVE
BLOOD, URINE: NEGATIVE
BUN BLDV-MCNC: 20 MG/DL (ref 7–22)
C-REACTIVE PROTEIN: 1.89 MG/DL (ref 0–1)
CALCIUM SERPL-MCNC: 9.7 MG/DL (ref 8.5–10.5)
CANNABINOID QUANTITATIVE URINE: NEGATIVE
CASTS: ABNORMAL /LPF
CASTS: ABNORMAL /LPF
CHARACTER, URINE: ABNORMAL
CHLORIDE BLD-SCNC: 100 MEQ/L (ref 98–111)
CO2: 25 MEQ/L (ref 23–33)
COCAINE METABOLITE QUANTITATIVE URINE: POSITIVE
COLOR: YELLOW
CREAT SERPL-MCNC: 1.1 MG/DL (ref 0.4–1.2)
CRYSTALS: ABNORMAL
EKG ATRIAL RATE: 62 BPM
EKG P AXIS: 65 DEGREES
EKG P-R INTERVAL: 150 MS
EKG Q-T INTERVAL: 434 MS
EKG QRS DURATION: 86 MS
EKG QTC CALCULATION (BAZETT): 440 MS
EKG R AXIS: 23 DEGREES
EKG T AXIS: 35 DEGREES
EKG VENTRICULAR RATE: 62 BPM
EOSINOPHIL # BLD: 1.1 %
EOSINOPHILS ABSOLUTE: 0.1 THOU/MM3 (ref 0–0.4)
EPITHELIAL CELLS, UA: ABNORMAL /HPF
ERYTHROCYTE [DISTWIDTH] IN BLOOD BY AUTOMATED COUNT: 14.4 % (ref 11.5–14.5)
ERYTHROCYTE [DISTWIDTH] IN BLOOD BY AUTOMATED COUNT: 40.2 FL (ref 35–45)
GFR SERPL CREATININE-BSD FRML MDRD: 50 ML/MIN/1.73M2
GLUCOSE BLD-MCNC: 91 MG/DL (ref 70–108)
GLUCOSE, URINE: NEGATIVE MG/DL
HCT VFR BLD CALC: 37.3 % (ref 37–47)
HEMOGLOBIN: 11.8 GM/DL (ref 12–16)
IMMATURE GRANS (ABS): 0.05 THOU/MM3 (ref 0–0.07)
IMMATURE GRANULOCYTES: 0.6 %
INR BLD: 1.13 (ref 0.85–1.13)
KETONES, URINE: NEGATIVE
LEUKOCYTE EST, POC: ABNORMAL
LIPASE: 34.1 U/L (ref 5.6–51.3)
LYMPHOCYTES # BLD: 36.3 %
LYMPHOCYTES ABSOLUTE: 3.2 THOU/MM3 (ref 1–4.8)
MCH RBC QN AUTO: 24.6 PG (ref 26–33)
MCHC RBC AUTO-ENTMCNC: 31.6 GM/DL (ref 32.2–35.5)
MCV RBC AUTO: 77.7 FL (ref 81–99)
MISCELLANEOUS LAB TEST RESULT: ABNORMAL
MONOCYTES # BLD: 6.6 %
MONOCYTES ABSOLUTE: 0.6 THOU/MM3 (ref 0.4–1.3)
NITRITE, URINE: NEGATIVE
NUCLEATED RED BLOOD CELLS: 0 /100 WBC
OPIATES, URINE: NEGATIVE
OSMOLALITY CALCULATION: 276 MOSMOL/KG (ref 275–300)
OXYCODONE: NEGATIVE
PH UA: 7 (ref 5–9)
PHENCYCLIDINE QUANTITATIVE URINE: NEGATIVE
PLATELET # BLD: 361 THOU/MM3 (ref 130–400)
PMV BLD AUTO: 8.4 FL (ref 9.4–12.4)
POTASSIUM REFLEX MAGNESIUM: 4.4 MEQ/L (ref 3.5–5.2)
PRO-BNP: 77 PG/ML (ref 0–900)
PROTEIN UA: NEGATIVE MG/DL
RBC # BLD: 4.8 MILL/MM3 (ref 4.2–5.4)
RBC URINE: ABNORMAL /HPF
RENAL EPITHELIAL, UA: ABNORMAL
SEG NEUTROPHILS: 54.8 %
SEGMENTED NEUTROPHILS ABSOLUTE COUNT: 4.9 THOU/MM3 (ref 1.8–7.7)
SODIUM BLD-SCNC: 137 MEQ/L (ref 135–145)
SPECIFIC GRAVITY UA: 1.02 (ref 1–1.03)
TOTAL PROTEIN: 6.8 G/DL (ref 6.1–8)
TROPONIN T: < 0.01 NG/ML
TSH SERPL DL<=0.05 MIU/L-ACNC: 3.58 UIU/ML (ref 0.4–4.2)
UROBILINOGEN, URINE: 1 EU/DL (ref 0–1)
WBC # BLD: 8.9 THOU/MM3 (ref 4.8–10.8)
WBC UA: ABNORMAL /HPF
YEAST: ABNORMAL

## 2019-05-05 PROCEDURE — 6360000002 HC RX W HCPCS: Performed by: FAMILY MEDICINE

## 2019-05-05 PROCEDURE — 36415 COLL VENOUS BLD VENIPUNCTURE: CPT

## 2019-05-05 PROCEDURE — 96365 THER/PROPH/DIAG IV INF INIT: CPT

## 2019-05-05 PROCEDURE — 81001 URINALYSIS AUTO W/SCOPE: CPT

## 2019-05-05 PROCEDURE — 72125 CT NECK SPINE W/O DYE: CPT

## 2019-05-05 PROCEDURE — 99220 PR INITIAL OBSERVATION CARE/DAY 70 MINUTES: CPT | Performed by: INTERNAL MEDICINE

## 2019-05-05 PROCEDURE — 85730 THROMBOPLASTIN TIME PARTIAL: CPT

## 2019-05-05 PROCEDURE — 2580000003 HC RX 258: Performed by: FAMILY MEDICINE

## 2019-05-05 PROCEDURE — 80053 COMPREHEN METABOLIC PANEL: CPT

## 2019-05-05 PROCEDURE — 83880 ASSAY OF NATRIURETIC PEPTIDE: CPT

## 2019-05-05 PROCEDURE — 85025 COMPLETE CBC W/AUTO DIFF WBC: CPT

## 2019-05-05 PROCEDURE — G0378 HOSPITAL OBSERVATION PER HR: HCPCS

## 2019-05-05 PROCEDURE — 2709999900 HC NON-CHARGEABLE SUPPLY

## 2019-05-05 PROCEDURE — 93005 ELECTROCARDIOGRAM TRACING: CPT | Performed by: FAMILY MEDICINE

## 2019-05-05 PROCEDURE — 84443 ASSAY THYROID STIM HORMONE: CPT

## 2019-05-05 PROCEDURE — 96372 THER/PROPH/DIAG INJ SC/IM: CPT

## 2019-05-05 PROCEDURE — 70450 CT HEAD/BRAIN W/O DYE: CPT

## 2019-05-05 PROCEDURE — 93010 ELECTROCARDIOGRAM REPORT: CPT | Performed by: NUCLEAR MEDICINE

## 2019-05-05 PROCEDURE — 83690 ASSAY OF LIPASE: CPT

## 2019-05-05 PROCEDURE — 71046 X-RAY EXAM CHEST 2 VIEWS: CPT

## 2019-05-05 PROCEDURE — 99285 EMERGENCY DEPT VISIT HI MDM: CPT

## 2019-05-05 PROCEDURE — 86140 C-REACTIVE PROTEIN: CPT

## 2019-05-05 PROCEDURE — 6370000000 HC RX 637 (ALT 250 FOR IP): Performed by: FAMILY MEDICINE

## 2019-05-05 PROCEDURE — 84484 ASSAY OF TROPONIN QUANT: CPT

## 2019-05-05 PROCEDURE — 80307 DRUG TEST PRSMV CHEM ANLYZR: CPT

## 2019-05-05 PROCEDURE — 85610 PROTHROMBIN TIME: CPT

## 2019-05-05 RX ORDER — ORPHENADRINE CITRATE 100 MG/1
100 TABLET, EXTENDED RELEASE ORAL 2 TIMES DAILY
Status: DISCONTINUED | OUTPATIENT
Start: 2019-05-05 | End: 2019-05-06 | Stop reason: HOSPADM

## 2019-05-05 RX ORDER — ARIPIPRAZOLE 2 MG/1
2 TABLET ORAL DAILY
Status: DISCONTINUED | OUTPATIENT
Start: 2019-05-06 | End: 2019-05-06 | Stop reason: HOSPADM

## 2019-05-05 RX ORDER — LUBIPROSTONE 24 UG/1
24 CAPSULE, GELATIN COATED ORAL 2 TIMES DAILY WITH MEALS
Status: DISCONTINUED | OUTPATIENT
Start: 2019-05-06 | End: 2019-05-06 | Stop reason: HOSPADM

## 2019-05-05 RX ORDER — ONDANSETRON 2 MG/ML
4 INJECTION INTRAMUSCULAR; INTRAVENOUS EVERY 6 HOURS PRN
Status: DISCONTINUED | OUTPATIENT
Start: 2019-05-05 | End: 2019-05-06 | Stop reason: HOSPADM

## 2019-05-05 RX ORDER — CARVEDILOL 3.12 MG/1
3.12 TABLET ORAL 2 TIMES DAILY WITH MEALS
Status: DISCONTINUED | OUTPATIENT
Start: 2019-05-06 | End: 2019-05-06 | Stop reason: HOSPADM

## 2019-05-05 RX ORDER — ISOSORBIDE DINITRATE 10 MG/1
10 TABLET ORAL 3 TIMES DAILY
Status: DISCONTINUED | OUTPATIENT
Start: 2019-05-06 | End: 2019-05-06 | Stop reason: HOSPADM

## 2019-05-05 RX ORDER — ACETAMINOPHEN 325 MG/1
650 TABLET ORAL EVERY 4 HOURS PRN
Status: DISCONTINUED | OUTPATIENT
Start: 2019-05-05 | End: 2019-05-06 | Stop reason: HOSPADM

## 2019-05-05 RX ORDER — SODIUM CHLORIDE 0.9 % (FLUSH) 0.9 %
10 SYRINGE (ML) INJECTION PRN
Status: DISCONTINUED | OUTPATIENT
Start: 2019-05-05 | End: 2019-05-06 | Stop reason: HOSPADM

## 2019-05-05 RX ORDER — SODIUM CHLORIDE 9 MG/ML
INJECTION, SOLUTION INTRAVENOUS CONTINUOUS
Status: DISCONTINUED | OUTPATIENT
Start: 2019-05-06 | End: 2019-05-06

## 2019-05-05 RX ORDER — LEVOTHYROXINE SODIUM 0.07 MG/1
75 TABLET ORAL DAILY
Status: DISCONTINUED | OUTPATIENT
Start: 2019-05-06 | End: 2019-05-06 | Stop reason: HOSPADM

## 2019-05-05 RX ORDER — PANTOPRAZOLE SODIUM 40 MG/1
40 TABLET, DELAYED RELEASE ORAL DAILY
Status: DISCONTINUED | OUTPATIENT
Start: 2019-05-06 | End: 2019-05-06 | Stop reason: HOSPADM

## 2019-05-05 RX ORDER — ASPIRIN 81 MG/1
81 TABLET, CHEWABLE ORAL DAILY
Status: DISCONTINUED | OUTPATIENT
Start: 2019-05-06 | End: 2019-05-06 | Stop reason: HOSPADM

## 2019-05-05 RX ORDER — IPRATROPIUM BROMIDE AND ALBUTEROL SULFATE 2.5; .5 MG/3ML; MG/3ML
1 SOLUTION RESPIRATORY (INHALATION)
Status: DISCONTINUED | OUTPATIENT
Start: 2019-05-06 | End: 2019-05-06 | Stop reason: HOSPADM

## 2019-05-05 RX ORDER — 0.9 % SODIUM CHLORIDE 0.9 %
1000 INTRAVENOUS SOLUTION INTRAVENOUS ONCE
Status: COMPLETED | OUTPATIENT
Start: 2019-05-05 | End: 2019-05-05

## 2019-05-05 RX ORDER — ASPIRIN 81 MG/1
81 TABLET ORAL DAILY
Status: DISCONTINUED | OUTPATIENT
Start: 2019-05-06 | End: 2019-05-06 | Stop reason: SDUPTHER

## 2019-05-05 RX ORDER — MIRTAZAPINE 15 MG/1
15 TABLET, FILM COATED ORAL NIGHTLY
Status: DISCONTINUED | OUTPATIENT
Start: 2019-05-06 | End: 2019-05-06 | Stop reason: HOSPADM

## 2019-05-05 RX ORDER — KETOROLAC TROMETHAMINE 30 MG/ML
30 INJECTION, SOLUTION INTRAMUSCULAR; INTRAVENOUS ONCE
Status: COMPLETED | OUTPATIENT
Start: 2019-05-05 | End: 2019-05-05

## 2019-05-05 RX ORDER — ORPHENADRINE CITRATE 30 MG/ML
60 INJECTION INTRAMUSCULAR; INTRAVENOUS ONCE
Status: DISCONTINUED | OUTPATIENT
Start: 2019-05-05 | End: 2019-05-05

## 2019-05-05 RX ORDER — SODIUM CHLORIDE 0.9 % (FLUSH) 0.9 %
10 SYRINGE (ML) INJECTION EVERY 12 HOURS SCHEDULED
Status: DISCONTINUED | OUTPATIENT
Start: 2019-05-06 | End: 2019-05-06 | Stop reason: HOSPADM

## 2019-05-05 RX ORDER — CLOPIDOGREL BISULFATE 75 MG/1
75 TABLET ORAL DAILY
Status: DISCONTINUED | OUTPATIENT
Start: 2019-05-06 | End: 2019-05-06 | Stop reason: HOSPADM

## 2019-05-05 RX ADMIN — KETOROLAC TROMETHAMINE 30 MG: 30 INJECTION, SOLUTION INTRAMUSCULAR at 19:12

## 2019-05-05 RX ADMIN — CEFTRIAXONE SODIUM 1 G: 1 INJECTION, POWDER, FOR SOLUTION INTRAMUSCULAR; INTRAVENOUS at 21:22

## 2019-05-05 RX ADMIN — ORPHENADRINE CITRATE 100 MG: 100 TABLET, EXTENDED RELEASE ORAL at 22:20

## 2019-05-05 RX ADMIN — SODIUM CHLORIDE 1000 ML: 9 INJECTION, SOLUTION INTRAVENOUS at 19:25

## 2019-05-05 ASSESSMENT — PAIN DESCRIPTION - DESCRIPTORS
DESCRIPTORS: CONSTANT

## 2019-05-05 ASSESSMENT — PAIN DESCRIPTION - ORIENTATION
ORIENTATION: RIGHT

## 2019-05-05 ASSESSMENT — PAIN DESCRIPTION - ONSET
ONSET: ON-GOING

## 2019-05-05 ASSESSMENT — ENCOUNTER SYMPTOMS
EYE DISCHARGE: 0
BACK PAIN: 0
DIARRHEA: 0
VOMITING: 0
EYE PAIN: 0
NAUSEA: 0
WHEEZING: 0
COUGH: 0
SHORTNESS OF BREATH: 0
SORE THROAT: 0
ABDOMINAL PAIN: 0
RHINORRHEA: 0

## 2019-05-05 ASSESSMENT — PAIN SCALES - GENERAL
PAINLEVEL_OUTOF10: 9

## 2019-05-05 ASSESSMENT — PAIN DESCRIPTION - PAIN TYPE
TYPE: ACUTE PAIN

## 2019-05-05 ASSESSMENT — PAIN DESCRIPTION - LOCATION
LOCATION: ARM
LOCATION: HAND
LOCATION: ARM

## 2019-05-05 ASSESSMENT — PAIN - FUNCTIONAL ASSESSMENT: PAIN_FUNCTIONAL_ASSESSMENT: PREVENTS OR INTERFERES SOME ACTIVE ACTIVITIES AND ADLS

## 2019-05-05 ASSESSMENT — PAIN DESCRIPTION - PROGRESSION
CLINICAL_PROGRESSION: NOT CHANGED

## 2019-05-05 ASSESSMENT — PAIN DESCRIPTION - FREQUENCY
FREQUENCY: CONTINUOUS

## 2019-05-05 NOTE — ED PROVIDER NOTES
[facility]  eMERGENCY dEPARTMENT eNCOUnter          [address] [facility]       Chief Complaint   Patient presents with    Tingling     right arm    Altered Mental Status    Aphasia       Nurses Notes reviewed and I agree except as noted inthe HPI. HISTORY OF PRESENT ILLNESS    Anita Robledo is a 66 y.o. female who presents to the Emergency Department for the evaluation of numbness/tingling to her right arm as well as speech change which is difficult for us to assess for she sounds normal but insists that her boyfriend states it is different . The patient has a history of diabetes, hypertension, hyperlipidemia, COPD, GERD, liver disease, thyroid disease, PE, and polysubstance abuse. Patient admits to cocaine use. She is also a smoker. The patient had a fall 3 days ago in which she tripped over unlevel steps. She is unsure as to whether or not she hit her head or had LOC. She has no clinical evidence of trauma at this time    Patient takes Plavix. Since then, she has developed the numbness/tingling of the right upper extremity. Patient has a roommate who told her today that her speech seemed ? ? slurred. The patient has a history of shoulder surgery x 2 to this right shoulder as well as nerve decompression procedure of this elbow. Patient was here on 04/09 and was diagnosed with a right shoulder strain. Patient denies shoulder or elbow pain, numbness, or tingling. She denies headache or neck pain at this time    She denies injury to the abdomen or chest or any of the extremities. Patient reports memory loss and confusion since the fall. She is alert and oriented, answering questions appropriately. No focal deficits and has a normal CNS exam at this time. Speech seems to be normal at this time. Patient also reports hitting her left knee in the duration of this fall and presents with scabbed abrasions. She denies nausea or vomiting.  No  or GI symptoms skin biopsy (2006); Cholecystectomy, laparoscopic (6/12/15); Elbow surgery (Bilateral, 2016); Carpal tunnel release (Bilateral, 2016); Abdomen surgery; Hysterectomy (1998); and Cardiac surgery. CURRENT MEDICATIONS       Previous Medications    ALBUTEROL (PROVENTIL HFA;VENTOLIN HFA) 108 (90 BASE) MCG/ACT INHALER    Inhale 2 puffs into the lungs every 6 hours as needed for Wheezing     ALBUTEROL SULFATE (VENTOLIN HFA IN)    Inhale 90 mg into the lungs 2 times daily    ALOSETRON (LOTRONEX) 0.5 MG TABLET    Take 0.5 mg by mouth 2 times daily    ARIPIPRAZOLE (ABILIFY) 2 MG TABLET    Take 1 tablet by mouth daily    BENZONATATE (TESSALON PERLES) 100 MG CAPSULE    Take 100 mg by mouth 3 times daily    CARVEDILOL (COREG) 3.125 MG TABLET    TAKE 1 TABLET BY MOUTH 2 TIMES DAILY (WITH MEALS)    CLOPIDOGREL (PLAVIX) 75 MG TABLET    TAKE 1 TABLET BY MOUTH DAILY    FERROUS SULFATE 325 (65 FE) MG TABLET    Take 325 mg by mouth daily (with breakfast)     FLUTICASONE (FLONASE) 50 MCG/ACT NASAL SPRAY    1 spray by Each Nare route daily    HYDROCHLOROTHIAZIDE (HYDRODIURIL) 25 MG TABLET    TAKE 1 TABLET BY MOUTH TWICE A DAY    HYDROXYZINE (VISTARIL) 50 MG CAPSULE    Take 50 mg by mouth 3 times daily as needed for Itching or Anxiety     HYOSCYAMINE  MCG TBCR EXTENDED RELEASE TABLET    Take by mouth 2 times daily    ISOSORBIDE DINITRATE (ISORDIL) 10 MG TABLET    TAKE 1 TABLET BY MOUTH 3 TIMES DAILY    LEVOTHYROXINE (SYNTHROID) 150 MCG TABLET    Take 150 mcg by mouth once a week Weekly on Sunday    LEVOTHYROXINE (SYNTHROID) 75 MCG TABLET    Take 75 mcg by mouth Six times weekly everyday except Sunday take 150 mcg.     LIPASE-PROTEASE-AMYLASE (ZENPEP) 5000 UNITS DELAYED RELEASE CAPSULE    Take 2 capsules by mouth 3 times daily (with meals) And 1 capsule with snacks    LUBIPROSTONE (AMITIZA) 24 MCG CAPSULE    Take 24 mcg by mouth 2 times daily (with meals)    MIRTAZAPINE (REMERON) 15 MG TABLET    Take 1 tablet by mouth nightly NABUMETONE (RELAFEN) 500 MG TABLET    Take 1 tablet by mouth 2 times daily    NYSTATIN (MYCOSTATIN) POWD POWDER    Apply 1 each topically 2 times daily    NYSTATIN-TRIAMCINOLONE (MYCOLOG II) 405825-5.1 UNIT/GM-% CREAM    Apply topically 4 times daily. PANTOPRAZOLE (PROTONIX) 40 MG TABLET    Take 1 tablet by mouth daily Indications: Gastroesophageal Reflux Disease    QUETIAPINE (SEROQUEL XR) 300 MG EXTENDED RELEASE TABLET    Take 2 tablets by mouth nightly    RANITIDINE HCL (RANITIDINE 75 PO)    Take 150 mg by mouth 2 times daily    TRAZODONE (DESYREL) 100 MG TABLET    Take 2 tablets by mouth nightly    TUDORZA PRESSAIR 400 MCG/ACT AEPB INHALER    1 puff 2 times daily        ALLERGIES     is allergic to seasonal.    FAMILY HISTORY     indicated that her mother is . She indicated that her father is . She indicated that her sister is alive. She indicated that her brother is alive. She indicated that the status of her maternal grandmother is unknown. She indicated that the status of her maternal grandfather is unknown. She indicated that the status of her paternal grandmother is unknown. She indicated that the status of her paternal grandfather is unknown. She indicated that the status of her maternal uncle is unknown.   family history includes Cancer in her father, mother, and sister; Depression in her father; Diabetes in her maternal grandmother; Early Death in her maternal grandfather; Heart Attack in her sister; Heart Disease in her father, maternal grandfather, maternal grandmother, maternal uncle, mother, paternal grandfather, and paternal grandmother; High Blood Pressure in her mother; High Cholesterol in her father, maternal grandfather, maternal grandmother, maternal uncle, mother, paternal grandfather, and paternal grandmother; Mental Illness in her father; Stroke in her maternal grandfather. SOCIAL HISTORY      reports that she has been smoking cigarettes.   She started smoking about 42 years ago. She has a 15.00 pack-year smoking history. She has never used smokeless tobacco. She reports that she has current or past drug history. Drug: Cocaine. Frequency: 1.00 time per week. She reports that she does not drink alcohol. PHYSICAL EXAM     INITIAL VITALS:  height is 5' 4\" (1.626 m) and weight is 160 lb (72.6 kg). Her oral temperature is 97.8 °F (36.6 °C). Her blood pressure is 127/97 (abnormal) and her pulse is 57. Her respiration is 17 and oxygen saturation is 94%. Physical Exam   Constitutional: She is oriented to person, place, and time. She appears well-developed and well-nourished. HENT:   Head: Normocephalic and atraumatic. Right Ear: External ear normal.   Left Ear: External ear normal.   Eyes: Conjunctivae are normal. Right eye exhibits no discharge. Left eye exhibits no discharge. No scleral icterus. Neck: Normal range of motion. Neck supple. No JVD present. Cardiovascular: Normal rate and regular rhythm. Pulmonary/Chest: Effort normal. No stridor. No respiratory distress. Abdominal: Soft. She exhibits no distension. Musculoskeletal: Normal range of motion. She exhibits no edema. Right shoulder: Normal.        Right elbow: Normal.       Right upper arm: Normal.   Neurological: She is alert and oriented to person, place, and time. No cranial nerve deficit or sensory deficit. She exhibits normal muscle tone. GCS eye subscore is 4. GCS verbal subscore is 5. GCS motor subscore is 6. No facial asymmetry. Normal finger to nose. Normal heel to shin. Skin: Skin is warm and dry. She is not diaphoretic. No erythema. Psychiatric: She has a normal mood and affect. Her behavior is normal.   Nursing note and vitals reviewed. INITIAL NIH STROKE SCALE    Time Performed:  1800    1a. Level of consciousness:  0 - alert; keenly responsive  1b. Level of consciousness questions:  0 - answers both questions correctly  1c.   Level of consciousness questions:  0 - performs both tasks correctly  2. Best Gaze:  0 - normal  3. Visual:  0 - no visual loss  4. Facial Palsy:  0 - normal symmetric movement  5a. Motor left arm:  0 - no drift, limb holds 90 (or 45) degrees for full 10 seconds  5b. Motor right arm:  0 - no drift, limb holds 90 (or 45) degrees for full 10 seconds  6a. Motor left le - no drift; leg holds 30 degree position for full 5 seconds  6b. Motor right le - no drift; leg holds 30 degree position for full 5 seconds  7. Limb Ataxia:  0 - absent  8. Sensory:  0 - normal; no sensory loss  9. Best Language:  0 - no aphasia, normal  10. Dysarthria:  0 - normal  11. Extinction and Inattention:  0 - no abnormality    TOTAL:  0    DIFFERENTIAL DIAGNOSIS:         DIAGNOSTIC RESULTS     EKG: All EKG's are interpreted by the Emergency Department Physician who either signs or Co-signs this chart in the absence of acardiologist.    Vent. Rate: 62 bpm  GA interval: 150 ms  QRS duration:86 ms  QTc: 440 ms  P-R-T axes: 65, 23, 35  Normal sinus rhythm. Inferior infarct. Abnormal ECG. No STEMI  Compared to old EKG on 03-Mar-2019      RADIOLOGY: non-plain film images(s) such as CT, Ultrasound and MRI are read by the radiologist.    CT Head WO Contrast   Final Result   1. No acute intracranial hemorrhage or mass effect. **This report has been created using voice recognition software. It may contain minor errors which are inherent in voice recognition technology. **         Final report electronically signed by Dr. Gold Flores on 2019 6:44 PM      CT Cervical Spine WO Contrast   Final Result   1. No acute fracture or malalignment. **This report has been created using voice recognition software. It may contain minor errors which are inherent in voice recognition technology. **      Final report electronically signed by Dr. Gold Flores on 2019 6:49 PM      XR CHEST STANDARD (2 VW)   Final Result   1. No acute cardiopulmonary disease. **This report has been created using voice recognition software. It may contain minor errors which are inherent in voice recognition technology. **      Final report electronically signed by Dr. Ester Spangler on 5/5/2019 6:25 PM          LABS:   Labs Reviewed   CBC WITH AUTO DIFFERENTIAL - Abnormal; Notable for the following components:       Result Value    Hemoglobin 11.8 (*)     MCV 77.7 (*)     MCH 24.6 (*)     MCHC 31.6 (*)     MPV 8.4 (*)     All other components within normal limits   APTT - Abnormal; Notable for the following components:    aPTT 41.4 (*)     All other components within normal limits   C-REACTIVE PROTEIN - Abnormal; Notable for the following components:    CRP 1.89 (*)     All other components within normal limits   GLOMERULAR FILTRATION RATE, ESTIMATED - Abnormal; Notable for the following components:    Est, Glom Filt Rate 50 (*)     All other components within normal limits   MICROSCOPIC URINALYSIS - Abnormal; Notable for the following components:    Leukocytes, UA LARGE (*)     Character, Urine CLOUDY (*)     All other components within normal limits   COMPREHENSIVE METABOLIC PANEL W/ REFLEX TO MG FOR LOW K   LIPASE   TROPONIN   BRAIN NATRIURETIC PEPTIDE   PROTIME-INR   TSH WITHOUT REFLEX   ANION GAP   OSMOLALITY   URINE DRUG SCREEN       EMERGENCY DEPARTMENT COURSE:   Vitals:    Vitals:    05/05/19 1802 05/05/19 1917 05/05/19 2018 05/05/19 2100   BP: 136/79 (!) 105/52 110/65 (!) 127/97   Pulse: 59 59 56 57   Resp: 20 18 16 17   Temp: 97.8 °F (36.6 °C)      TempSrc: Oral      SpO2: 94% 93% 94% 94%   Weight: 160 lb (72.6 kg)      Height: 5' 4\" (1.626 m)        MDM    Patient presented to the emergency department with acute confusional state numbness tingling of upper extremity and subjective/speech. Clinical exam is normal.  Drug screen positive for cocaine abuse. Leukocyte positive. Nitrate negative WBC count 25-50. We will treat her for UTI.   CBC BUN/creatinine and x-rays a

## 2019-05-05 NOTE — ED NOTES
Pt resting quietly in room no needs expressed. Side rails up x2 with call light in reach. Will continue to monitor. Pt provided new depends and repositioned in bed.         Gordo Caba, [address] [facility]  05/05/19 1205

## 2019-05-06 ENCOUNTER — APPOINTMENT (OUTPATIENT)
Dept: MRI IMAGING | Age: 64
End: 2019-05-06
Payer: COMMERCIAL

## 2019-05-06 VITALS
BODY MASS INDEX: 27.71 KG/M2 | HEIGHT: 64 IN | HEART RATE: 63 BPM | WEIGHT: 162.31 LBS | SYSTOLIC BLOOD PRESSURE: 139 MMHG | TEMPERATURE: 98.6 F | DIASTOLIC BLOOD PRESSURE: 83 MMHG | RESPIRATION RATE: 16 BRPM | OXYGEN SATURATION: 94 %

## 2019-05-06 PROBLEM — R29.898 RIGHT ARM WEAKNESS: Status: ACTIVE | Noted: 2019-05-06

## 2019-05-06 LAB
CHOLESTEROL, TOTAL: 186 MG/DL (ref 100–199)
FOLATE: 3 NG/ML (ref 4.8–24.2)
HDLC SERPL-MCNC: 26 MG/DL
IRON: 79 UG/DL (ref 50–170)
LDL CHOLESTEROL CALCULATED: 81 MG/DL
TRIGL SERPL-MCNC: 395 MG/DL (ref 0–199)
VITAMIN B-12: 277 PG/ML (ref 211–911)

## 2019-05-06 PROCEDURE — 70551 MRI BRAIN STEM W/O DYE: CPT

## 2019-05-06 PROCEDURE — 6360000002 HC RX W HCPCS: Performed by: INTERNAL MEDICINE

## 2019-05-06 PROCEDURE — G0378 HOSPITAL OBSERVATION PER HR: HCPCS

## 2019-05-06 PROCEDURE — 70549 MR ANGIOGRAPH NECK W/O&W/DYE: CPT

## 2019-05-06 PROCEDURE — 6360000002 HC RX W HCPCS: Performed by: PSYCHIATRY & NEUROLOGY

## 2019-05-06 PROCEDURE — 97535 SELF CARE MNGMENT TRAINING: CPT

## 2019-05-06 PROCEDURE — 83540 ASSAY OF IRON: CPT

## 2019-05-06 PROCEDURE — 97165 OT EVAL LOW COMPLEX 30 MIN: CPT

## 2019-05-06 PROCEDURE — 6370000000 HC RX 637 (ALT 250 FOR IP): Performed by: FAMILY MEDICINE

## 2019-05-06 PROCEDURE — 94640 AIRWAY INHALATION TREATMENT: CPT

## 2019-05-06 PROCEDURE — 6370000000 HC RX 637 (ALT 250 FOR IP): Performed by: INTERNAL MEDICINE

## 2019-05-06 PROCEDURE — 96372 THER/PROPH/DIAG INJ SC/IM: CPT

## 2019-05-06 PROCEDURE — 2580000003 HC RX 258: Performed by: INTERNAL MEDICINE

## 2019-05-06 PROCEDURE — 82607 VITAMIN B-12: CPT

## 2019-05-06 PROCEDURE — 2709999900 HC NON-CHARGEABLE SUPPLY

## 2019-05-06 PROCEDURE — A9579 GAD-BASE MR CONTRAST NOS,1ML: HCPCS | Performed by: INTERNAL MEDICINE

## 2019-05-06 PROCEDURE — 80061 LIPID PANEL: CPT

## 2019-05-06 PROCEDURE — 99220 PR INITIAL OBSERVATION CARE/DAY 70 MINUTES: CPT | Performed by: PSYCHIATRY & NEUROLOGY

## 2019-05-06 PROCEDURE — 82746 ASSAY OF FOLIC ACID SERUM: CPT

## 2019-05-06 PROCEDURE — 36415 COLL VENOUS BLD VENIPUNCTURE: CPT

## 2019-05-06 PROCEDURE — 6360000004 HC RX CONTRAST MEDICATION: Performed by: INTERNAL MEDICINE

## 2019-05-06 PROCEDURE — 99217 PR OBSERVATION CARE DISCHARGE MANAGEMENT: CPT | Performed by: INTERNAL MEDICINE

## 2019-05-06 PROCEDURE — 95819 EEG AWAKE AND ASLEEP: CPT

## 2019-05-06 PROCEDURE — 70544 MR ANGIOGRAPHY HEAD W/O DYE: CPT

## 2019-05-06 RX ORDER — QUETIAPINE 300 MG/1
600 TABLET, FILM COATED, EXTENDED RELEASE ORAL NIGHTLY
Status: DISCONTINUED | OUTPATIENT
Start: 2019-05-06 | End: 2019-05-06 | Stop reason: HOSPADM

## 2019-05-06 RX ORDER — LORAZEPAM 1 MG/1
1 TABLET ORAL EVERY 4 HOURS PRN
Status: DISCONTINUED | OUTPATIENT
Start: 2019-05-06 | End: 2019-05-06 | Stop reason: HOSPADM

## 2019-05-06 RX ORDER — BIOTIN 10 MG
1 TABLET ORAL DAILY
Qty: 30 TABLET | Refills: 2 | Status: ON HOLD | OUTPATIENT
Start: 2019-05-06 | End: 2019-05-14 | Stop reason: ALTCHOICE

## 2019-05-06 RX ORDER — FOLIC ACID 1 MG/1
1 TABLET ORAL DAILY
Qty: 30 TABLET | Refills: 3 | Status: SHIPPED | OUTPATIENT
Start: 2019-05-07 | End: 2019-12-11

## 2019-05-06 RX ORDER — TRAMADOL HYDROCHLORIDE 50 MG/1
50 TABLET ORAL EVERY 6 HOURS PRN
Status: DISCONTINUED | OUTPATIENT
Start: 2019-05-06 | End: 2019-05-06 | Stop reason: HOSPADM

## 2019-05-06 RX ORDER — CYANOCOBALAMIN 1000 UG/ML
1000 INJECTION INTRAMUSCULAR; SUBCUTANEOUS ONCE
Status: COMPLETED | OUTPATIENT
Start: 2019-05-06 | End: 2019-05-06

## 2019-05-06 RX ORDER — FOLIC ACID 1 MG/1
1 TABLET ORAL DAILY
Status: DISCONTINUED | OUTPATIENT
Start: 2019-05-06 | End: 2019-05-06 | Stop reason: HOSPADM

## 2019-05-06 RX ORDER — ASPIRIN 81 MG/1
81 TABLET, CHEWABLE ORAL DAILY
Qty: 30 TABLET | Refills: 3 | Status: ON HOLD | COMMUNITY
Start: 2019-05-07 | End: 2020-07-13 | Stop reason: HOSPADM

## 2019-05-06 RX ORDER — TRAMADOL HYDROCHLORIDE 50 MG/1
50 TABLET ORAL EVERY 6 HOURS PRN
Qty: 12 TABLET | Refills: 0 | Status: SHIPPED | OUTPATIENT
Start: 2019-05-06 | End: 2019-05-09

## 2019-05-06 RX ADMIN — IPRATROPIUM BROMIDE AND ALBUTEROL SULFATE 1 AMPULE: .5; 3 SOLUTION RESPIRATORY (INHALATION) at 08:47

## 2019-05-06 RX ADMIN — ACETAMINOPHEN 650 MG: 325 TABLET ORAL at 15:55

## 2019-05-06 RX ADMIN — GADOTERIDOL 15 ML: 279.3 INJECTION, SOLUTION INTRAVENOUS at 13:21

## 2019-05-06 RX ADMIN — PANTOPRAZOLE SODIUM 40 MG: 40 TABLET, DELAYED RELEASE ORAL at 06:35

## 2019-05-06 RX ADMIN — ISOSORBIDE DINITRATE 10 MG: 10 TABLET ORAL at 15:55

## 2019-05-06 RX ADMIN — LORAZEPAM 1 MG: 1 TABLET ORAL at 11:15

## 2019-05-06 RX ADMIN — IPRATROPIUM BROMIDE AND ALBUTEROL SULFATE 1 AMPULE: .5; 3 SOLUTION RESPIRATORY (INHALATION) at 15:34

## 2019-05-06 RX ADMIN — ORPHENADRINE CITRATE 100 MG: 100 TABLET, EXTENDED RELEASE ORAL at 11:15

## 2019-05-06 RX ADMIN — ISOSORBIDE DINITRATE 10 MG: 10 TABLET ORAL at 08:40

## 2019-05-06 RX ADMIN — SODIUM CHLORIDE: 9 INJECTION, SOLUTION INTRAVENOUS at 01:42

## 2019-05-06 RX ADMIN — ARIPIPRAZOLE 2 MG: 2 TABLET ORAL at 08:40

## 2019-05-06 RX ADMIN — CLOPIDOGREL BISULFATE 75 MG: 75 TABLET, FILM COATED ORAL at 08:40

## 2019-05-06 RX ADMIN — LEVOTHYROXINE SODIUM 75 MCG: 75 TABLET ORAL at 06:35

## 2019-05-06 RX ADMIN — ASPIRIN 81 MG 81 MG: 81 TABLET ORAL at 08:40

## 2019-05-06 RX ADMIN — ENOXAPARIN SODIUM 40 MG: 40 INJECTION SUBCUTANEOUS at 08:39

## 2019-05-06 RX ADMIN — CARVEDILOL 3.12 MG: 3.12 TABLET, FILM COATED ORAL at 08:40

## 2019-05-06 RX ADMIN — CARVEDILOL 3.12 MG: 3.12 TABLET, FILM COATED ORAL at 15:55

## 2019-05-06 RX ADMIN — MIRTAZAPINE 15 MG: 15 TABLET, FILM COATED ORAL at 01:41

## 2019-05-06 RX ADMIN — ACETAMINOPHEN 650 MG: 325 TABLET ORAL at 03:39

## 2019-05-06 RX ADMIN — CYANOCOBALAMIN 1000 MCG: 1000 INJECTION, SOLUTION INTRAMUSCULAR; SUBCUTANEOUS at 15:54

## 2019-05-06 RX ADMIN — PANCRELIPASE LIPASE, PANCRELIPASE PROTEASE, PANCRELIPASE AMYLASE 1 CAPSULE: 10000; 32000; 42000 CAPSULE, DELAYED RELEASE ORAL at 11:15

## 2019-05-06 RX ADMIN — PANCRELIPASE LIPASE, PANCRELIPASE PROTEASE, PANCRELIPASE AMYLASE 1 CAPSULE: 10000; 32000; 42000 CAPSULE, DELAYED RELEASE ORAL at 15:56

## 2019-05-06 RX ADMIN — PANCRELIPASE LIPASE, PANCRELIPASE PROTEASE, PANCRELIPASE AMYLASE 1 CAPSULE: 10000; 32000; 42000 CAPSULE, DELAYED RELEASE ORAL at 08:40

## 2019-05-06 RX ADMIN — FOLIC ACID 1 MG: 1 TABLET ORAL at 15:54

## 2019-05-06 ASSESSMENT — PAIN DESCRIPTION - LOCATION
LOCATION: HAND

## 2019-05-06 ASSESSMENT — PAIN DESCRIPTION - PAIN TYPE
TYPE: ACUTE PAIN

## 2019-05-06 ASSESSMENT — PAIN DESCRIPTION - ONSET
ONSET: ON-GOING

## 2019-05-06 ASSESSMENT — PAIN DESCRIPTION - DESCRIPTORS
DESCRIPTORS: BURNING
DESCRIPTORS: CONSTANT

## 2019-05-06 ASSESSMENT — PAIN DESCRIPTION - FREQUENCY
FREQUENCY: CONTINUOUS

## 2019-05-06 ASSESSMENT — PAIN DESCRIPTION - PROGRESSION
CLINICAL_PROGRESSION: NOT CHANGED

## 2019-05-06 ASSESSMENT — PAIN SCALES - GENERAL
PAINLEVEL_OUTOF10: 9
PAINLEVEL_OUTOF10: 9
PAINLEVEL_OUTOF10: 10
PAINLEVEL_OUTOF10: 9

## 2019-05-06 ASSESSMENT — PAIN - FUNCTIONAL ASSESSMENT: PAIN_FUNCTIONAL_ASSESSMENT: PREVENTS OR INTERFERES SOME ACTIVE ACTIVITIES AND ADLS

## 2019-05-06 ASSESSMENT — PAIN DESCRIPTION - ORIENTATION
ORIENTATION: RIGHT

## 2019-05-06 NOTE — H&P
History & Physical        Patient:  Darline Lora  YOB: 1955    MRN: [id number]     Acct: [de-identified]    PCP: BA Guerrero CNP    Date of Admission: 5/5/2019    Date of Service: Pt seen/examined on 5/5/2019 and Placed in Observation. Chief Complaint:    Chief Complaint   Patient presents with    Tingling     right arm    Altered Mental Status    Aphasia         History Of Present Illness:      66 y.o. female who presented to [facility] [place] with \"evaluation of numbness/tingling to her right arm as well as speech change which is difficult for us to assess for she sounds normal but insists that her boyfriend states it is different . The patient has a history of diabetes, hypertension, hyperlipidemia, COPD, GERD, liver disease, thyroid disease, PE, and polysubstance abuse. Patient admits to cocaine use. She is also a smoker. The patient had a fall 3 days ago in which she tripped over unlevel steps. She is unsure as to whether or not she hit her head or had LOC. She has no clinical evidence of trauma at this time. Patient takes Plavix. Since then, she has developed the numbness/tingling of the right upper extremity. Patient has a roommate who told her today that her speech seemed ? ? slurred. The patient has a history of shoulder surgery x 2 to this right shoulder as well as nerve decompression procedure of this elbow. Patient was here on 04/09 and was diagnosed with a right shoulder strain.  Ap Bob denies shoulder or elbow pain, numbness, or tingling. She denies headache or neck pain at this time   She denies injury to the abdomen or chest or any of the extremities. Patient reports memory loss and confusion since the fall. She is alert and oriented, answering questions appropriately. No focal deficits and has a normal CNS exam at this time. speech seems to be normal at this time.  Patient also reports hitting her left knee in the duration of this fall and presents Take 1 tablet by mouth 2 times daily 4/1/19   Addison Colmenares MD   mirtazapine (REMERON) 15 MG tablet Take 1 tablet by mouth nightly 3/19/19   Noble Rojas MD   ARIPiprazole (ABILIFY) 2 MG tablet Take 1 tablet by mouth daily 3/19/19   Noble Rojas MD   alosetron (LOTRONEX) 0.5 MG tablet Take 0.5 mg by mouth 2 times daily    Historical Provider, MD   lipase-protease-amylase (ZENPEP) 5000 units delayed release capsule Take 2 capsules by mouth 3 times daily (with meals) And 1 capsule with snacks    Historical Provider, MD   lubiprostone (AMITIZA) 24 MCG capsule Take 24 mcg by mouth 2 times daily (with meals)    Historical Provider, MD   benzonatate (TESSALON PERLES) 100 MG capsule Take 100 mg by mouth 3 times daily    Historical Provider, MD   nystatin (MYCOSTATIN) POWD powder Apply 1 each topically 2 times daily    Historical Provider, MD   clopidogrel (PLAVIX) 75 MG tablet TAKE 1 TABLET BY MOUTH DAILY 3/8/19   Darrell Oviedo PA-C   nystatin-triamcinolone (MYCOLOG II) 022447-1.6 UNIT/GM-% cream Apply topically 4 times daily.  2/21/19   Jenna Fisher MD   fluticasone [place]) 50 MCG/ACT nasal spray 1 spray by Each Nare route daily 2/11/19   [facility],    hydrOXYzine (VISTARIL) 50 MG capsule Take 50 mg by mouth 3 times daily as needed for Itching or Anxiety     Historical Provider, MD   traZODone (DESYREL) 100 MG tablet Take 2 tablets by mouth nightly 10/31/18   Noble Rojas MD   QUEtiapine (SEROQUEL XR) 300 MG extended release tablet Take 2 tablets by mouth nightly 10/31/18   Noble Rojas MD   levothyroxine (SYNTHROID) 150 MCG tablet Take 150 mcg by mouth once a week Weekly on Sunday    Historical Provider, MD   RaNITidine HCl (RANITIDINE 75 PO) Take 150 mg by mouth 2 times daily    Historical Provider, MD   Albuterol Sulfate (VENTOLIN HFA IN) Inhale 90 mg into the lungs 2 times daily    Historical Provider, MD   isosorbide dinitrate (ISORDIL) 10 MG tablet TAKE 1 Maternal Grandfather     High Cholesterol Maternal Grandfather     Stroke Maternal Grandfather     Heart Disease Paternal Grandmother     High Cholesterol Paternal Grandmother     Heart Disease Paternal Grandfather     High Cholesterol Paternal Grandfather        Diet:  No diet orders on file    REVIEW OF SYSTEMS:   Pertinent positives as noted in the HPI. All other systems reviewed and negative. PHYSICAL EXAM:    BP (!) 127/97   Pulse 57   Temp 97.8 °F (36.6 °C) (Oral)   Resp 17   Ht 5' 4\" (1.626 m)   Wt 160 lb (72.6 kg)   SpO2 94%   BMI 27.46 kg/m²     General appearance:  No apparent distress, appears stated age and cooperative. HEENT:  Normal cephalic, atraumatic without obvious deformity. Pupils equal, round, and reactive to light. Extra ocular muscles intact. Conjunctivae/corneas clear. Speech is slightly sluured  Neck: Supple, with full range of motion. no jugular venous distention. Trachea midline. no carotid bruits  Respiratory:  Normal respiratory effort. Clear to auscultation, bilaterally without Rales/Wheezes/Rhonchi. Breath sounds equal bilaterally  Cardiovascular:  Regular rate and rhythm with normal S1/S2 without murmurs, rubs or gallops. PMI non displaced  Abdomen: Soft, non-tender, non-distended with normal bowel sounds. No guarding, rebound. Musculoskeletal:  No clubbing, cyanosis or edema bilaterally. Full range of motion without deformity. Skin: Skin color, texture, turgor normal.  No rashes or lesions, or suspicious lesions.   Neurologic:  l arm decrease two point, cn intact  Psychiatric:  Alert and oriented, thought content appropriate, normal insight  Capillary Refill: Brisk,< 2 seconds   Peripheral Pulses: +2 palpable, equal bilaterally upper and lower extremities  Lymphatics: no lymphadenopathy      Labs:     Recent Labs     05/05/19  1812   WBC 8.9   HGB 11.8*   HCT 37.3        Recent Labs     05/05/19  1811      K 4.4      CO2 25   BUN 20   CREATININE 1. 1   CALCIUM 9.7     Recent Labs     05/05/19  1811   AST 24   ALT 21   BILITOT 0.3   ALKPHOS 46     Recent Labs     05/05/19  1812   INR 1.13     No results for input(s): Courtney Adames in the last 72 hours. Urinalysis:      Lab Results   Component Value Date    NITRU NEGATIVE 05/05/2019    WBCUA 25-50 05/05/2019    WBCUA 0-2 04/19/2012    BACTERIA NONE 05/05/2019    RBCUA 3-5 05/05/2019    BLOODU NEGATIVE 05/05/2019    SPECGRAV 1.017 05/05/2019    GLUCOSEU NEGATIVE 03/11/2019       Radiology:     CXR: I have reviewed the CXR with the following interpretation:   EKG:  I have reviewed the EKG with the following interpretation:     Xr Chest Standard (2 Vw)    Result Date: 5/5/2019  PROCEDURE: XR CHEST (2 VW) CLINICAL INFORMATION: confusion/fall COMPARISON: 4/9/2019 TECHNIQUE:  PA and lateral chest x-ray  FINDINGS: The cardiomediastinal silhouette appears within normal limits. No focal consolidation, pleural effusion or pneumothorax is seen. No acute osseous abnormalities are demonstrated. There is an azygos fissure demonstrated. There is diffuse osteopenia noted. 1. No acute cardiopulmonary disease. **This report has been created using voice recognition software. It may contain minor errors which are inherent in voice recognition technology. ** Final report electronically signed by Dr. Jennifer Perdomo on 5/5/2019 6:25 PM    Xr Chest Standard (2 Vw)    Result Date: 4/9/2019  PROCEDURE: XR CHEST (2 VW) CLINICAL INFORMATION: chest pain. COMPARISON: March 3, 2019. TECHNIQUE: PA and lateral views the chest. FINDINGS: Minimal atelectasis within the lung bases. Mild blunting/pleural fusion of the right costophrenic angle. No discrete lobar consolidation. No cardiomegaly. No acute osseous findings. Possible trace right pleural effusion. No lobar consolidation. **This report has been created using voice recognition software. It may contain minor errors which are inherent in voice recognition technology. ** Final report electronically signed by Dr. Rebel Scherer on 4/9/2019 4:57 PM    Ct Head Wo Contrast    Result Date: 5/5/2019  CT SCAN OF THE BRAIN WITHOUT CONTRAST CLINICAL INFORMATION: Fall COMPARISON: 2/21/2019 TECHNIQUE:  Scans were obtained from the base of the skull to the vertex without IV contrast. All CT scans at this facility use dose modulation, iterative reconstruction, and/or weight-based dosing when appropriate to reduce radiation dose to as low as reasonably achievable. FINDINGS:  No acute intracranial hemorrhage or mass effect is seen. There is no midline shift. The lateral ventricles and cerebral sulci appear normal in size and configuration. There is normal differentiation of the gray-white matter junction. The basal cisterns appear within normal limits. The posterior fossa appears normal. No extra-axial fluid collections are seen. The visualized globes and orbits appear grossly intact. No abnormalities of the calvarium are identified. The visualized paranasal sinuses and mastoid air cells appear clear. 1. No acute intracranial hemorrhage or mass effect. **This report has been created using voice recognition software. It may contain minor errors which are inherent in voice recognition technology. ** Final report electronically signed by Dr. Nicanor Felix on 5/5/2019 6:44 PM    Ct Cervical Spine Wo Contrast    Result Date: 5/5/2019  PROCEDURE: CT CERVICAL SPINE WO CONTRAST CLINICAL INFORMATION: Fall COMPARISON: 4/1/2019 TECHNIQUE:  Axial CT images were obtained through the cervical spine without contrast. Coronal and sagittal reformatted images were rendered. All CT scans at this facility use dose modulation, iterative reconstruction, and/or weight-based dosing when appropriate to reduce radiation dose to as low as reasonably achievable. FINDINGS: There is normal alignment at the craniocervical junction. The facets align normally. The spinous processes appear intact.  There is no prevertebral soft tissue swelling. The dens interval appears within normal limits. The dens appears intact. No loss of vertebral body height is seen. No significant AP canal stenosis or neural foraminal narrowing is seen. The visualized lung apices appear unremarkable. 1. No acute fracture or malalignment. **This report has been created using voice recognition software. It may contain minor errors which are inherent in voice recognition technology. ** Final report electronically signed by Dr. Singh Noble on 5/5/2019 6:49 PM    Mri Pelvis Wo Contrast    Result Date: 5/3/2019  PROCEDURE: MRI PELVIS WO CONTRAST CLINICAL INFORMATION: Change in bowel habits and rectocele; technologist notes state diarrhea and irritable bowel. COMPARISON: No prior study. TECHNIQUE: 120 ml of ultrasound gel was instilled into the rectum and 60 ml of ultrasound get were instilled into the vagina. The patient was placed in a supine position on the MRI gantry. Thin section T2WI of the pelvis was performed in axial, coronal and sagittal planes. Midline sagittal True FISP cine imaging of the pelvis was then performed during rest, Kegel (squeeze) maneuver, Valsalva (strain), and defecation. FINDINGS: THIN SECTION T2WI PELVIS: 1. The bowel gas pattern is nonobstructive. There is no bowel wall thickening or mass. 2. There is underdistention of the urinary bladder however there is no wall thickening or mass lesion. 3. There is no pathologically enlarged lymphadenopathy. 4. Hysterectomy. 5. There is no free air free fluid. There is no inflammatory process. 6. There is a small amount of mesenteric fat extending into the umbilicus. 7. There is hemopoietic marrow signal. No acute musculoskeletal abnormality is seen. CINE-REST: 1. The anorectal angle is 5.1 cm inferior to the pubococcygeal line (moderate descent). 2. The vagina is 2.9 cm inferior to the pubococcygeal line (borderline moderate descent). 3. Normal position of the urinary bladder and normal urethral axis.  4. Normal anorectal angle measuring 113 degrees. CINE KEGEL (SQUEEZE): 1. There is mild physiologic elevation of the pelvic floor and physiologically narrowing of the anorectal angle during the Kegel maneuver. CINE VALSALVA (STRAIN): 1. The anorectal junction is 5.2 cm inferior to the pubococcygeal line (moderate descent). 2. The vagina is 2.9 cm inferior to the pubococcygeal line (borderline moderate descent). 3. Normal positioning of the urinary bladder and urethral axis. 4. There is physiologic widening of the anorectal angle measuring 120 degrees. 5. There is an anterior rectocele measuring 2.8 x 1.6 cm in longitudinal and AP dimensions (small). CINE-DEFECATION: 1. There is physiologic widening of the anorectal angle measuring 130 degrees. 2. The anorectal junction is 6.4 cm inferior to the pubococcygeal line (severe descent). 3. The vagina is 4.3 cm inferior to the pubococcygeal line (moderate descent). 4. The urinary bladder is 1.0 cm inferior to the pubococcygeal line (mild descent). 5. There is an anterior rectocele measuring 3.8 x 2.9 cm in longitudinal and AP dimensions (moderate). 6. There is complete evacuation of the rectum during the evacuation phase. 1. Anorectal descent measuring 5.1 cm at rest (moderate), 5.2 cm during Valsalva (moderate) and 6.4 cm during evacuation (severe). 2. Vaginal descent measuring 2.9 cm at rest (borderline moderate), 2.9 cm during Valsalva (borderline moderate) and cysts 4.3 cm during defecation (moderate). 3. Urinary bladder descent measuring 1.0 cm during defecation (mild descent) 4. Anterior rectocele measuring 2.8 x 1.6 cm in longitudinal and AP dimensions during Valsalva (small) and 3.8 x 2.9 cm in longitudinal and AP dimensions during defecation (moderate). **This report has been created using voice recognition software. It may contain minor errors which are inherent in voice recognition technology. ** Final report electronically signed by Dr. Stephanie Cotton on 5/3/2019 2:30 PM           DVT prophylaxis: [x] Lovenox                                 [] SCDs                                 [] SQ Heparin                                 [] Encourage ambulation           [] Already on Anticoagulation    Code Status: Prior      PT/OT Eval Status:   Disposition:    [x] Home       [] TCU       [] Rehab       [] Psych       [] SNF       [] [place]       [] Other-    ASSESSMENT:    Active Hospital Problems    Diagnosis Date Noted    Stroke-like symptom [R29.90] 05/05/2019    Cocaine abuse ([facility] [address] [F14.10]        PLAN:    1. Observation  2. ivf  3. Mri brain w/o  4. Neuro consulted  5. Stop cocaine        Thank you BA Gutiérrez - KESHAV for the opportunity to be involved in this patient's care.     Electronically signed by Farhat Villela DO on 5/5/2019 at 9:29 PM

## 2019-05-06 NOTE — PROGRESS NOTES
Hospitalist Progress Note    Patient:  Monica Muniz      Unit/Bed:8B-35/035-A    YOB: 1955    MRN: [id number]       Acct: [de-identified]     PCP: BA Henriquez CNP    Date of Admission: 5/5/2019    Reason for admission: shaking extremities, slurred speech ( she did not mention tingling right arm as she did in ER). Hx of ?seizure. Cocaine use    Expected discharge date:  24 h     Disposition: Home    Hospital Course: Just arrived. Presented to Er with tingling right arm, slurred speech; she also mentoined to me jerking of her arms. Duration unclear. CT neg to date. Neuro consulted. In the Last 24 hours: No new events in the past 24 hours.       Medications:  Reviewed    Infusion Medications   Scheduled Medications    cyanocobalamin  1,000 mcg Intramuscular Once    folic acid  1 mg Oral Daily    ipratropium-albuterol  1 ampule Inhalation Q4H WA    ARIPiprazole  2 mg Oral Daily    carvedilol  3.125 mg Oral BID WC    clopidogrel  75 mg Oral Daily    isosorbide dinitrate  10 mg Oral TID    levothyroxine  75 mcg Oral Daily    mirtazapine  15 mg Oral Nightly    pantoprazole  40 mg Oral Daily    lubiprostone  24 mcg Oral BID WC    QUEtiapine  500 mg Oral Nightly    aspirin  81 mg Oral Daily    sodium chloride flush  10 mL Intravenous 2 times per day    enoxaparin  40 mg Subcutaneous Q24H    orphenadrine  100 mg Oral BID    lipase-protease-amylase  1 capsule Oral TID WC     PRN Meds: LORazepam, sodium chloride flush, magnesium hydroxide, ondansetron, acetaminophen      Intake/Output Summary (Last 24 hours) at 5/6/2019 1022  Last data filed at 5/5/2019 2152  Gross per 24 hour   Intake 1050 ml   Output --   Net 1050 ml       Diet:  DIET GENERAL;    Exam:  /83   Pulse 63   Temp 97.7 °F (36.5 °C) (Oral)   Resp 18   Ht 5' 4\" (1.626 m)   Wt 162 lb 5 oz (73.6 kg)   SpO2 92%   BMI 27.86 kg/m²     Physical Examination: General appearance - alert, well appearing, and in no distress  Mental status - alert, oriented to person, place, and time  Neck - supple, no significant adenopathy, no JVD, or carotid bruits  Chest - clear to auscultation, no wheezes, rales or rhonchi, symmetric air entry  Heart - normal rate, regular rhythm, normal S1, S2, no murmurs, rubs, clicks or gallops  Abdomen - soft, nontender, nondistended, no masses or organomegaly  Neurological - alert, oriented, normal speech, no focal findings or movement disorder noted, tends to pastpoint with both ues but smooth  Musculoskeletal - no muscular tenderness noted  Extremities - no pedal edema noted, feet cracked  Skin - See above      Labs:   Recent Labs     05/05/19  1812   WBC 8.9   HGB 11.8*   HCT 37.3        Recent Labs     05/05/19  1811      K 4.4      CO2 25   BUN 20   CREATININE 1.1   CALCIUM 9.7     Recent Labs     05/05/19  1811   AST 24   ALT 21   BILITOT 0.3   ALKPHOS 46     Recent Labs     05/05/19  1812   INR 1.13     No results for input(s): CKTOTAL, TROPONINI in the last 72 hours. Microbiology:      Urinalysis:      Lab Results   Component Value Date    NITRU NEGATIVE 05/05/2019    WBCUA 25-50 05/05/2019    WBCUA 0-2 04/19/2012    BACTERIA NONE 05/05/2019    RBCUA 3-5 05/05/2019    BLOODU NEGATIVE 05/05/2019    SPECGRAV 1.017 05/05/2019    GLUCOSEU NEGATIVE 03/11/2019       Radiology:  Xr Chest Standard (2 Vw)    Result Date: 5/5/2019  PROCEDURE: XR CHEST (2 VW) CLINICAL INFORMATION: confusion/fall COMPARISON: 4/9/2019 TECHNIQUE:  PA and lateral chest x-ray  FINDINGS: The cardiomediastinal silhouette appears within normal limits. No focal consolidation, pleural effusion or pneumothorax is seen. No acute osseous abnormalities are demonstrated. There is an azygos fissure demonstrated. There is diffuse osteopenia noted. 1. No acute cardiopulmonary disease. **This report has been created using voice recognition software.   It may contain minor errors which are inherent in voice recognition technology. ** Final report electronically signed by Dr. Audrey Echavarria on 5/5/2019 6:25 PM    Xr Chest Standard (2 Vw)    Result Date: 4/9/2019  PROCEDURE: XR CHEST (2 VW) CLINICAL INFORMATION: chest pain. COMPARISON: March 3, 2019. TECHNIQUE: PA and lateral views the chest. FINDINGS: Minimal atelectasis within the lung bases. Mild blunting/pleural fusion of the right costophrenic angle. No discrete lobar consolidation. No cardiomegaly. No acute osseous findings. Possible trace right pleural effusion. No lobar consolidation. **This report has been created using voice recognition software. It may contain minor errors which are inherent in voice recognition technology. ** Final report electronically signed by Dr. Ernie Blackmon on 4/9/2019 4:57 PM    Ct Head Wo Contrast    Result Date: 5/5/2019  CT SCAN OF THE BRAIN WITHOUT CONTRAST CLINICAL INFORMATION: Fall COMPARISON: 2/21/2019 TECHNIQUE:  Scans were obtained from the base of the skull to the vertex without IV contrast. All CT scans at this facility use dose modulation, iterative reconstruction, and/or weight-based dosing when appropriate to reduce radiation dose to as low as reasonably achievable. FINDINGS:  No acute intracranial hemorrhage or mass effect is seen. There is no midline shift. The lateral ventricles and cerebral sulci appear normal in size and configuration. There is normal differentiation of the gray-white matter junction. The basal cisterns appear within normal limits. The posterior fossa appears normal. No extra-axial fluid collections are seen. The visualized globes and orbits appear grossly intact. No abnormalities of the calvarium are identified. The visualized paranasal sinuses and mastoid air cells appear clear. 1. No acute intracranial hemorrhage or mass effect. **This report has been created using voice recognition software. It may contain minor errors which are inherent in voice recognition technology. ** Final report electronically signed by Dr. Mayelin Hamm on 5/5/2019 6:44 PM    Ct Cervical Spine Wo Contrast    Result Date: 5/5/2019  PROCEDURE: CT CERVICAL SPINE WO CONTRAST CLINICAL INFORMATION: Fall COMPARISON: 4/1/2019 TECHNIQUE:  Axial CT images were obtained through the cervical spine without contrast. Coronal and sagittal reformatted images were rendered. All CT scans at this facility use dose modulation, iterative reconstruction, and/or weight-based dosing when appropriate to reduce radiation dose to as low as reasonably achievable. FINDINGS: There is normal alignment at the craniocervical junction. The facets align normally. The spinous processes appear intact. There is no prevertebral soft tissue swelling. The dens interval appears within normal limits. The dens appears intact. No loss of vertebral body height is seen. No significant AP canal stenosis or neural foraminal narrowing is seen. The visualized lung apices appear unremarkable. 1. No acute fracture or malalignment. **This report has been created using voice recognition software. It may contain minor errors which are inherent in voice recognition technology. ** Final report electronically signed by Dr. Mayelin Hamm on 5/5/2019 6:49 PM    Mri Pelvis Wo Contrast    Result Date: 5/3/2019  PROCEDURE: MRI PELVIS WO CONTRAST CLINICAL INFORMATION: Change in bowel habits and rectocele; technologist notes state diarrhea and irritable bowel. COMPARISON: No prior study. TECHNIQUE: 120 ml of ultrasound gel was instilled into the rectum and 60 ml of ultrasound get were instilled into the vagina. The patient was placed in a supine position on the MRI gantry. Thin section T2WI of the pelvis was performed in axial, coronal and sagittal planes. Midline sagittal True FISP cine imaging of the pelvis was then performed during rest, Kegel (squeeze) maneuver, Valsalva (strain), and defecation. FINDINGS: THIN SECTION T2WI PELVIS: 1.  The bowel gas pattern is nonobstructive. There is no bowel wall thickening or mass. 2. There is underdistention of the urinary bladder however there is no wall thickening or mass lesion. 3. There is no pathologically enlarged lymphadenopathy. 4. Hysterectomy. 5. There is no free air free fluid. There is no inflammatory process. 6. There is a small amount of mesenteric fat extending into the umbilicus. 7. There is hemopoietic marrow signal. No acute musculoskeletal abnormality is seen. CINE-REST: 1. The anorectal angle is 5.1 cm inferior to the pubococcygeal line (moderate descent). 2. The vagina is 2.9 cm inferior to the pubococcygeal line (borderline moderate descent). 3. Normal position of the urinary bladder and normal urethral axis. 4. Normal anorectal angle measuring 113 degrees. CINE KEGEL (SQUEEZE): 1. There is mild physiologic elevation of the pelvic floor and physiologically narrowing of the anorectal angle during the Kegel maneuver. CINE VALSALVA (STRAIN): 1. The anorectal junction is 5.2 cm inferior to the pubococcygeal line (moderate descent). 2. The vagina is 2.9 cm inferior to the pubococcygeal line (borderline moderate descent). 3. Normal positioning of the urinary bladder and urethral axis. 4. There is physiologic widening of the anorectal angle measuring 120 degrees. 5. There is an anterior rectocele measuring 2.8 x 1.6 cm in longitudinal and AP dimensions (small). CINE-DEFECATION: 1. There is physiologic widening of the anorectal angle measuring 130 degrees. 2. The anorectal junction is 6.4 cm inferior to the pubococcygeal line (severe descent). 3. The vagina is 4.3 cm inferior to the pubococcygeal line (moderate descent). 4. The urinary bladder is 1.0 cm inferior to the pubococcygeal line (mild descent). 5. There is an anterior rectocele measuring 3.8 x 2.9 cm in longitudinal and AP dimensions (moderate). 6. There is complete evacuation of the rectum during the evacuation phase.      1. Anorectal descent measuring 5.1 cm

## 2019-05-06 NOTE — FLOWSHEET NOTE
[address] [place] [address]  PHYSICAL THERAPY MISSED TREATMENT NOTE  ACUTE CARE  [facility] MED SURG 8B              Missed Treatment  PT holding until MRI has been completed and read and Neurology has seen pt. PT will re-attempt as time allows and pt is appropriate.

## 2019-05-06 NOTE — ED NOTES
Pt resting quietly in room no needs expressed. Side rails up x2 with call light in reach. Will continue to monitor.        [facility]Con[facility]  05/05/19 2100

## 2019-05-06 NOTE — PROGRESS NOTES
1838: AVS discussed with patient. Patient had no questions or concerns at this time. Per patient request prescriptions were transferred to [address] [place] [address] on [facility]. Patient discharged in stable condition.

## 2019-05-06 NOTE — PROGRESS NOTES
[address]  INPATIENT OCCUPATIONAL THERAPY  [facility] MED SURG 8B  EVALUATION    Time:  Time In: 1505  Time Out: 1535  Timed Code Treatment Minutes: 12 Minutes    Date: 2019  Patient Name: Cherry Leyva,   Gender: female      MRN: [id number]  : 1955  (66 y.o.)  Referring Practitioner: Dr. Jadyn Farmer   Diagnosis: stroke like symptoms  Additional Pertinent Hx: 66 y.o. female who presents to the Emergency Department 19 for the evaluation of numbness/tingling to her right arm as well as speech change which is difficult for us to assess for she sounds normal but insists that her boyfriend states it is different. Pt with a fall 3 days ago. The patient has a history of diabetes, hypertension, hyperlipidemia, COPD, GERD, liver disease, thyroid disease, PE, and polysubstance abuse. Patient admits to cocaine use and tested postive. Pts MRI of brain negative at this time. Restrictions/Precautions:  Fall Risk                            Subjective  Chart Reviewed: Yes  Patient assessed for rehabilitation services?: Yes    Subjective: Pt lying in bed and agreeable to OT session this pm. Pt had MRI of head completed with results back. Pain:  Pain Level: 10  Pain Type: Acute pain  Pain Location: Hand  Pain Orientation: Right    Social/Functional History:  Lives With: Friend(s)  Type of Home: House  Home Layout: Two level, Bed/Bath upstairs(Pt has no bathroom on 1st floor. )  Home Access: Stairs to enter with rails   Bathroom Shower/Tub: Tub/Shower unit  Bathroom Toilet: Standard  Bathroom Accessibility: Accessible  IADL Comments: Pt stating she is able to take care of herself and do what she needs to do.         ADL Assistance: Independent  Homemaking Assistance: Independent  Ambulation Assistance: Independent  Transfer Assistance: Independent          Additional Comments: Pt reporting being indep with ADL tasks and mobility with no AD     Cognition/Orientation:     Overall Cognitive Status: WNL    ADL;s:  Grooming: Stand by assistance(washing hands at sink )  LE Dressing: Moderate assistance  Toileting: Stand by assistance    Mobility:  Bed mobility  Supine to Sit: Modified independent  Sit to Supine: Modified independent    Functional Mobility  Functional - Mobility Device: No device  Activity: To/from bathroom  Assist Level: Contact guard assistance  Functional Mobility Comments: into hallways with no LOB during. Pt with slight increased in SOB during. Pt gaurded with right UE during holding it on abdomen with left d/t pain. RN notified. Transfers:  Sit to stand: Stand by assistance(from EOB )  Stand to sit: Stand by assistance    Upper Extremity Assessment:Hand Dominance: Right  LUE AROM : WNL  RUE AROM : WNL  RUE General AROM: {Pt very gaurded with movements at this time d/t increased pain     LUE Strength  Gross LUE Strength: WFL  RUE Strength  RUE Strength Comment: expect WFL/ NT d/t pt being very gaurded at this time d/t increased pain    Sensation  Overall Sensation Status: (Pt reporting having a burning sensation in right ulnar nerve distribution going down into R5. Pt has a h/p ulnar never decompression several years ago. )  RUE Tone: Normotonic  LUE Tone: Normotonic       Assessment:  Assessment: Pt admitted with stroke like symptoms. Pt demo decreased ability to complete her ADL asks at [facility]. Pt would benefit from skilled OT Services to increase her indep and safety with ADL tasks and simple IADL tasks to reutn home. Performance deficits / Impairments: Decreased ADL status, Decreased safe awareness  Prognosis: Good  REQUIRES OT FOLLOW UP: Yes  Decision Making: Low Complexity       Discharge Recommendations:  Discharge Recommendations: Home with assist PRN    Patient Education:  Patient Education: OT POC  Barriers to Learning: none     Equipment Recommendations:  Equipment Needed: No    Safety:  Safety Devices in place: Yes  Type of devices:  All fall risk precautions in place, Nurse notified, Gait belt, Call light within reach, Left in bed    Plan:  Times per week: 3-5x   Current Treatment Recommendations: Patient/Caregiver Education & Training, Balance Training, Self-Care / ADL, Safety Education & Training    Goals:  Patient goals : go home   Short term goals  Time Frame for Short term goals: 2 weeks   Short term goal 1: Pt to complete BADL routine with SBA and novcs for safety   Short term goal 2: pt to demo dynamic standing > 4 min wth no UE suppoet and SBA in prep for simple homemaking tasks   Long term goals  Time Frame for Long term goals : not est d/t ELOS

## 2019-05-06 NOTE — PROGRESS NOTES
[facility] Laboratory Technician Worksheet      EEG Date: 2019    Name: Aleksandr Emerson   : 1955   Age: 66 y.o.   SEX: female    ROOM: [facility] MRN: [id number]           CSN: [id number]      Ordering Provider: leopold np  EEG Number: [id number] of Test:  0267    Hand: Right   Sedation: no    H.V. Done: No not done Photic: Yes    Sleep: Yes  Drowsy: Yes   Sleep Deprived: No    Seizures observed: no    Technician Impression:3    Mentality: alert      Clinical History:tingling rt arm, slurred speech, jerking arms    Past Medical History:       Diagnosis Date    Allergic rhinitis     Arthritis     spine    [facility]     Blood circulation, collateral     Cancer ([facility]     cancerous polyps removed - Dr. Phoebe Anderson Colon polyps     COPD (chronic obstructive pulmonary disease) ([facility] 2014    Depression     Diabetes ([facility]     HgA1c 7.2 3/2017 - started Metformin and FSBS <150    Diverticulitis of colon     GERD (gastroesophageal reflux disease)     Hiatal hernia     History of colonoscopy     History of kidney stones     Hx of blood clots 2014    PE and collar bone area after shoulder surgery    Hyperlipidemia     Hypertension     Liver disease     enlarged liver - damaged with alcohol in past but no cirrhosis per patient    Pneumonia 2014    Suicidal thoughts      admitted to  from [facility]    Thyroid disease     Vomiting     Wears dentures     Wears glasses        Scheduled Meds:   cyanocobalamin  1,000 mcg Intramuscular Once    folic acid  1 mg Oral Daily    QUEtiapine  600 mg Oral Nightly    ipratropium-albuterol  1 ampule Inhalation Q4H WA    ARIPiprazole  2 mg Oral Daily    carvedilol  3.125 mg Oral BID WC    clopidogrel  75 mg Oral Daily    isosorbide dinitrate  10 mg Oral TID    levothyroxine  75 mcg Oral Daily    mirtazapine  15 mg Oral Nightly    pantoprazole  40 mg Oral Daily    lubiprostone  24 mcg

## 2019-05-06 NOTE — CONSULTS
Consult to Neurology  Consult performed by: Kris Fry MD  Consult ordered by: Dewey Rueda DO          NEUROLOGY CONSULT NOTE      Requesting Physician: Chanel Dowling MD    Reason for Consult:  Evaluate for right side weakness and jerking. History of Present Illness:  Eboni Woodruff is a 66 y.o. female admitted to [facility] on 5/5/2019. She presents?to the Emergency Department for the evaluation of numbness/tingling to her right arm, jerking of her right arm, and slurred speech. ?She reports she was watching television when her symptoms occurred. Symptoms were severe and constant. Onset was sudden. Modifiers are unknown. She reports intermittent right arm jerking for the past week. Duration of symptoms lasted approximately 2 hours and then resolved. She feels back to her normal self. She denies any history of seizure. The patient has a history of diabetes, hypertension, hyperlipidemia, COPD, GERD, liver disease, thyroid disease, PE, and polysubstance abuse. Patient admits to cocaine use. She is also a smoker. She is on Plavix. Reports no chest pain. No shortness of breath with exertion. No vision changes. No dysphagia. No fever. No rash. No weight loss. CT head done 5/5/19 showed No acute intracranial hemorrhage or mass effect. History provided by patient and review of medical record.          Past Medical History:        Diagnosis Date    Allergic rhinitis     Arthritis     spine    Bipolar disorder ([address]     Blood circulation, collateral     Cancer ([address] 2011    cancerous polyps removed - Dr. Alvarado Friend Colon polyps     COPD (chronic obstructive pulmonary disease) ([facility] 7/24/2014    Depression     Diabetes ([facility]     HgA1c 7.2 3/2017 - started Metformin and FSBS <150    Diverticulitis of colon     GERD (gastroesophageal reflux disease)     Hiatal hernia     History of colonoscopy 2002    History of kidney stones     Hx of blood clots 6/17/2014    PE and collar bone tablet 650 mg Q4H PRN   orphenadrine (NORFLEX) extended release tablet 100 mg BID   lipase-protease-amylase (ZENPEP) 88277-02931 units delayed release capsule 1 capsule TID WC        Social History:  Social History     Tobacco Use   Smoking Status Current Every Day Smoker    Packs/day: 0.50    Years: 30.00    Pack years: 15.00    Types: Cigarettes    Start date: 4/22/1977   Smokeless Tobacco Never Used     Social History     Substance and Sexual Activity   Alcohol Use No    Comment: none for 2 years     Social History     Substance and Sexual Activity   Drug Use Yes    Frequency: 1.0 times per week    Types: Cocaine    Comment: 4 days ago smoked crack cocaine         Family History:       Problem Relation Age of Onset    Cancer Mother     Heart Disease Mother     High Blood Pressure Mother     High Cholesterol Mother     Cancer Father         brain    Depression Father     Heart Disease Father     High Cholesterol Father     Mental Illness Father     Cancer Sister     Heart Attack Sister     Heart Disease Maternal Uncle     High Cholesterol Maternal Uncle     Diabetes Maternal Grandmother     Heart Disease Maternal Grandmother     High Cholesterol Maternal Grandmother     Early Death Maternal Grandfather     Heart Disease Maternal Grandfather     High Cholesterol Maternal Grandfather     Stroke Maternal Grandfather     Heart Disease Paternal Grandmother     High Cholesterol Paternal Grandmother     Heart Disease Paternal Grandfather     High Cholesterol Paternal Grandfather        Review of Systems:  All systems reviewed are negative except what is mentioned in history of present illness. Physical Exam:  /83   Pulse 63   Temp 97.7 °F (36.5 °C) (Oral)   Resp 18   Ht 5' 4\" (1.626 m)   Wt 162 lb 5 oz (73.6 kg)   SpO2 92%   BMI 27.86 kg/m²  I Body mass index is 27.86 kg/m².  I Wt Readings from Last 1 Encounters:   05/05/19 162 lb 5 oz (73.6 kg)           General appearance - alert, well appearing, and in no distress, oriented to person, place, and time and overweight  Mental status -Level of Alertness: awake, laying in bed on her left side watching television  Orientation: person, place, time  Memory: normal  Fund of Knowledge: normal  Attention/Concentration: normal  Language: normal. Mood is normal  Neck - supple, no significant adenopathy, carotids upstroke normal bilaterally, no carotid bruits. There is no LAP in the neck. There is no thyroid enlargement. Neurological -   Cranial Yjetgo-JQ-CHG:   Cranial nerve II: Normal. There is full visual fields  Cranial nerve III: Pupils: equal, round, reactive to light   Cranial nerves III, IV, VI: Extraocular Movements: intact   Cranial nerve V: Facial sensation: intact   Cranial nerve VII:Facial strength: intact   Cranial nerve VIII: Hearing: intact   Cranial nerve IX: Palate Elevation intact bilaterally  Cranial nerve XI: Shoulder shrug intact bilaterally  Cranial nerve XII: Tongue midline   neck supple without rigidity  DTR's are decreased distal and symmetric  Babinski sign is negative on bilaterally. Motor exam is 5/5 in the upper and lower extremities. Normal muscle tone. There is no muscle atrophy. There is no muscle fasciculation. Drift No  Sensory is intact forlight touch . Coordination: finger to nose intact  Gait and station not tested,   Abnormal movement none. vibration normal, proprioception normal   Skin - no rashes, abrasion to left knee  Superficial temporal artery pulses are normal.   Musculoskeletal: Has no hand arthritis, no limitation of ROM in any of the four extremities. no joint tenderness, deformity or swelling. There is no leg edema. The Heart was regular in rate and rhythm.  No heart murmur  Chest- Clear         Labs:    CBC: Recent Labs     05/05/19  1812   WBC 8.9   HGB 11.8*      MCV 77.7*   MCH 24.6*   MCHC 31.6*     CMP:  Recent Labs     05/05/19  1811      K 4.4      CO2 signal.         Imaged portions of the posterior fossa parenchyma as well appear grossly       acceptable. Foramen magnum is widely patent. C2-3 canal is grossly patent as are the foramina. There are right more       than left mild facet degenerative changes. Minimal disc degenerative       change. At C3-4, slight bulging disc and endplate osteophytes as well as small       uncovertebral osteophytes are present. Minimal central canal stenosis and       very minimal foraminal stenosis evident. Mild facet degenerative changes       evident. Page 1 of 2              Print date/time:7/19/2014 2:53 PM              [facility]          Patient Name: Prema Briggs        Patient Type: Jarocho Solis           MRN:  [id number]        Gender:  Female                   Account Number:  [de-identified]        Francesca Lion:  Angela Pineda M.D. YOB: 1955                                          Pt Loc:  Outpatient                             R a d i o l o g y   R e p o r t         Accession Number:  Procedure Name:             Exam Date/Time:       [id number]      MRI Spine Cervical Canal WO 7/17/2014 1:39:38 PM                          Contr         C4-5 canal is mildly narrowed due to bilateral uncovertebral osteophytes. There are minimal endplate osteophytes. No focal disc material.  Minimal       bulging disc material if any. There is bilateral mild foraminal stenosis,       right more than left. Mild facet degenerative changes also present. Endplate osteophytes and uncovertebral osteophytes are also present at the       C5-6 level. Mild central canal stenosis results. Right more than left       mild foraminal stenosis also. There are mild facet degenerative changes       bilaterally. C6-7 canal is grossly patent. Degenerative endplate changes are evident,       and there is right more than left uncovertebral osteophyte formation.        Minimal right foraminal stenosis, less impressive canal and left foraminal       stenosis. Very mild facet degenerative changes bilaterally. C7-T1 canal is widely patent. There are moderate facet degenerative       changes bilaterally. The foramina are grossly patent. There is very       minimal desiccation of the disc material and no significant disc protrusion       or bulging. Surrounding tissues show no concerning findings. IMPRESSION:  Degenerative disc and facet changes evident producing mild       stenosis of the canal and foramina. However, areas of stenosis are       essentially confined to the canal and foramina at C4-5 and C5-6 with only       mild stenosis resulting as discussed. No evidence of acute abnormality or       myelopathic change. Dictated By :  Constantino Duffy M.D. Certified Electronic Signature       [facility].       Dictated Date and Time:  17-JUL-2014 14:42       Transcribed By:  RONI       Transcribed Date and Time:  18-JUL-2014 22:28       Approved By:  Constantino Duffy M.D. Approve Date and Time:  19-JUL-2014 14:52         Technologist:  Tommy HU  RT(R)(MR)           Page 2 of 2              Print date/time:7/19/2014 2:53 PM       Results for orders placed during the hospital encounter of 12/17/17   CTA HEAD W WO CONTRAST    Narrative PROCEDURE: CTA HEAD W WO CONTRAST    CLINICAL INFORMATION: \"pop\" in nose, nosebleed and bluury vision, headache    COMPARISON: No prior cerebral vascular imaging for comparison. Limited comparison is made to the CT of the brain, performed at the same time. TECHNIQUE: With the patient in supine position, axial images were obtained, extending from the superior neck to the calvarial vertex. IV contrast (Isovue-370, 80 mL) was administered for maximal arterial opacification. Images were reconstructed in the   axial plane at 3 mm thickness, relative to the CT gantry.  Multiple two-dimensional and three-dimensional reconstructed images were also performed for CT angiographic purposes. All CT scans at this facility use dose modulation, iterative reconstruction,   and/or weight-based dosing when appropriate to reduce the radiation dose to as low as reasonably achievable. FINDINGS: Limited detail of the cervical internal carotid arteries is within acceptable limits. Petrous and cavernous internal carotid arteries have mild atherosclerotic characteristics without resultant hemodynamically significant stenosis. There is mild atherosclerotic disease of the paraclinoid left internal carotid artery producing approximately 50% stenosis. The left carotid terminus and the left A1 and M1 segments are within acceptable limits. Minimal amounts of atherosclerotic disease of the paraclinoid right internal carotid artery. Right carotid terminus is satisfactory. Right A1 and M1 segments are also within acceptable limits. Anterior communicating artery region is within normal limits. A2 segments of both anterior cerebral arteries are satisfactory. The limited detail of the M2 and M3 segments of both middle cerebral arteries show no concerning findings. Limited detail of the V3 segments of both vertebral arteries have no concerning findings. There is a dominant right vertebral artery. There is mild atherosclerotic disease in the V4 segments of each vertebral artery V4 segment, without resultant   hemodynamically significant stenosis. Posterior inferior cerebellar artery origins are satisfactory. Basilar artery caliber is within acceptable limits throughout. Superior cerebellar artery origins are grossly satisfactory. Normal appearance of the   basilar tip and the P1 segments bilaterally. The P2 segments of both posterior cerebral arteries are patent, though there is evidence of estimated 40-50% stenosis in the left posterior cerebral artery P2 segment.     The cortical arterial structures of the brain show no concerning findings. Major dural venous sinuses are patent. Incidental venous angioma the right frontal lobe. The limited detail of the soft tissues of the nasopharyngeal regions reveal no evidence of vascular abnormality. No structural abnormality to indicate explanation for epistaxis. Incidentally, there are prominent calibers of the nasal turbinates with a   deviated nasal septum, inferiorly deviated left to right, severely deviated right than left, possibly related to the stated complaints. The patient is edentulous with secondary volume loss of the maxillary and mandibular alveolus implied, incompletely   imaged. Mastoid air cells and middle ear cavities are clear. Impression NO EVIDENCE OF ACUTE ARTERIAL ABNORMALITY. TO THE LIMITS OF CTA OF THE BRAIN, NO STRUCTURAL ABNORMALITY TO INDICATE EXPLANATION FOR EPISTAXIS. EVIDENCE OF INTRACRANIAL ARTHROSCLEROSIS INCLUDING STENOSIS, POORLY DEFINED, IN THE LEFT POSTERIOR   CEREBRAL ARTERY P2 SEGMENT, WHICH COULD BE RELATED TO THE COMPLAINTS OF VISION DISTURBANCE AND HEADACHE. CLINICAL CORRELATION NECESSARY AS THIS IS A NONSPECIFIC FINDING. **This report has been created using voice recognition software. It may contain minor errors which are inherent in voice recognition technology. **        Final report electronically signed by Dr. Elizabeth Platt on 12/17/2017 10:14 PM       Results for orders placed during the hospital encounter of 05/06/16   MRI Brain W WO Contrast    Narrative PROCEDURE: MRI BRAIN W WO CONTRAST    CLINICAL INFORMATIONSNHL (sensory-neural hearing loss), asymmetrical, Presbycusis, bilateral. Bilateral ear ringing. Sensorineural healing loss. Unsteady gait. Visible lump on her head. COMPARISON: Brain MRI 10/23/2013.     TECHNIQUE: Multiplanar and multiple spin echo T1 and T2-weighted images were obtained through the brain before and after the administration of intravenous contrast. Dedicated images were obtained through the IACs including high-resolution T2-weighted   images and pre-and postcontrast T1-weighted images with fat saturation. FINDINGS:           The diffusion-weighted images are normal. The brain volume is normal.  There are no intra-or extra-axial collections. There is no hydrocephalus, midline shift or mass effect. On the FLAIR and T2-weighted sequences, there is a minimal amount of signal hyperintensity in the white matter of the brain. This is most likely due to minimal severity chronic small vessel ischemic changes. This is stable. On the gradient echo T2-weighted images, there is mineralization in the medial aspects of the basal ganglia. No other areas of susceptibility artifact are present. There is no abnormal enhancement in the brain. There is a stable small incidental developmental venous anomaly in the right frontal lobe. There is a left parietal scalp subcutaneous lesion, most likely a sebaceous cyst. This does not enhance. The major   intracranial vascular flow voids are present. The midline craniocervical junction structures are normal.  The brainstem and pituitary gland are normal.    There is mild mucosal thickening in the left maxillary sinus. Dedicated images through the IACs demonstrate a normal appearance to the cerebellopontine angles. The cerebellum appears normal. The cochlea and semicircular canals have normal signal. There is no fluid signal in the mastoid air cells. The trigeminal   nerves and Meckel's cave are normal. There is no abnormal enhancement. The cavernous sinuses are normal. There is a small area of mucosal thickening in the sphenoid sinus. Impression IMPRESSION:     1. Normal-appearing IACs. 2. Stable minimal severity chronic small vessel ischemic changes          **This report has been created using voice recognition software. It may contain minor errors which are inherent in voice recognition technology. **        Final report electronically signed by Dr. Dae Hdz on 5/6/2016 4:48 PM       Reviewed   Results for orders placed during the hospital encounter of 05/05/19   CT Head WO Contrast    Narrative CT SCAN OF THE BRAIN WITHOUT CONTRAST    CLINICAL INFORMATION: Fall    COMPARISON: 2/21/2019    TECHNIQUE:  Scans were obtained from the base of the skull to the vertex without IV contrast. All CT scans at this facility use dose modulation, iterative reconstruction, and/or weight-based dosing when appropriate to reduce radiation dose to as low as   reasonably achievable. FINDINGS:      No acute intracranial hemorrhage or mass effect is seen. There is no midline shift. The lateral ventricles and cerebral sulci appear normal in size and configuration. There is normal differentiation of the gray-white matter junction. The basal cisterns   appear within normal limits. The posterior fossa appears normal. No extra-axial fluid collections are seen. The visualized globes and orbits appear grossly intact. No abnormalities of the calvarium are identified. The visualized paranasal sinuses and   mastoid air cells appear clear. Impression 1. No acute intracranial hemorrhage or mass effect. **This report has been created using voice recognition software. It may contain minor errors which are inherent in voice recognition technology. **      Final report electronically signed by Dr. Fenton Boeck on 5/5/2019 6:44 PM         We reviewed the patient records and available information in the EHR       Impression:    Principal Problem:    Stroke-like symptom  Active Problems:    Cocaine abuse ([facility] [address]    Right arm weakness  Resolved Problems:    * No resolved hospital problems. *  This is a 66year old female who presents with an episode of right arm numbness and jerking and slurred speech. Symptoms lasted approximately 2 hours and then resolved.  She feels she is back to her baseline, her exam is non focal. Of note, her urine drug screen was positive for cocaine. We will arrange for her to undergo MRI brain to evaluate for stroke. We will also order EEG to complete work up from neurology standpoint. After detailed discussion with patient we agreed on the following plan      Recommendations:                                              Antiplatelets/anticoagulation. MRI brain without contrast with diffusion sequence. Fasting lipid profile/ start Lipid lowering medication. Physical therapy, Occupational Therapy and Speech Therapy for Dysphagia/Languge  Modify Risk factors (example Hypertension, Diabetes, hyperlipidemia, Smoking Cessation)  CTA head & Neck. EEG  B12, Folate. Permissive hypertension for up to one week. DVT prophylaxis. Homocysteine Level, Start Folic acid 1 mg Daily until results obtained. Patient and family questions were answered. Will follow please call if any questions. Case was discussed with primary service. All questions were answered. It was my pleasure to evaluate Shari Brought today. Please call with questions.       Electronically signed by Luzma Weems MD on 5/6/2019 at 9:36 AM

## 2019-05-06 NOTE — CARE COORDINATION
5/6/19, 3:15 PM    DISCHARGE BARRIERS      SW consult for \"Drug Abuse\", full consult deferred. Spoke with patient, she admits to using cocaine occasionally when a friend will visit and brings cocaine. Carlos Brady states she does not think it is a problem and does not need help. Carlos Brady stated she has been using occasionally for 40 years. Encouraged her to discuss with Doctor is her cocaine use could contribute to why she is hospitalized as she does not associate cocaine use with possible medical problems. SW will follow if there are further needs.

## 2019-05-06 NOTE — PLAN OF CARE
Problem: Impaired respiratory status  Goal: Clear lung sounds  Outcome: Ongoing  Note:   Pt started on aerosols with Duoneb to clear lung sounds/improve aeration and for maintenance of COPD.

## 2019-05-06 NOTE — CARE COORDINATION
CASE MANAGEMENT OBSERVATION NOTE       5/6/19, 9:07 AM    Ulysses Rote       Admitted from: ER 5/5/2019/ 1752   Location: [address] [place] [address]- Reason for admit: Stroke-like symptom [R29.90]   Admit order signed?: yes    Procedure: No    Pertinent Info/Orders/Treatment Plan:  Telemetry. PT/OT. Consult to Neuro. Stroke education. PCP: BA Singh - CNP    Discharge Plan: Met with pt today. Dozes off to sleep quickly during conversation. She is from home with a rik friend. The friend drives. Pt has no current services. She does have a nebulizer at home. She has a PCP and no problem getting her medications. No discharge needs voiced.

## 2019-05-06 NOTE — ED NOTES
Pt resting quietly in room no needs expressed. Side rails up x2 with call light in reach. Will continue to monitor.        Eva Wallace [facility]  05/05/19 2018

## 2019-05-07 ENCOUNTER — TELEPHONE (OUTPATIENT)
Dept: NEUROLOGY | Age: 64
End: 2019-05-07

## 2019-05-07 NOTE — TELEPHONE ENCOUNTER
Patient was discharged on 5/6/19 after hours. Pre service received a call from Kenyetta Verma at [facility] to schedule a follow up with Dr Nuria Kothari in 1 month for stroke like symptoms. PLEASE contact the patient with an appointment. Thank you!

## 2019-05-08 NOTE — PROCEDURES
[address] [id number]                          ELECTROENCEPHALOGRAM REPORT    PATIENT NAME: Nikki Singletary                  :        1955  MED REC NO:   [id number]                           ROOM:       1093  ACCOUNT NO:   [de-identified]                           ADMIT DATE: 2019  PROVIDER:     Courtney Cassidy. Keyonna Campos MD    DATE OF EE2019    REFERRING PROVIDER:  Kin Guzman CNP. CLINICAL HISTORY:  This is a 72-year-old female with right arm tingling,  jerking and slurred speech. This is a 17-channel EEG performed without sleep deprivation. Hyperventilation was not performed. The patient is described as alert. The background activity noted to be slowed, poorly organized in the  theta, and delta range activity. Bifrontal delta activity was observed  periodically during the recording. Hyperventilation was not performed. Light stages of sleep are seen during the recording. Frontal  intermittent delta activity was noted suggestive of a paradoxical  cortical dysfunction. Clinical correlation is recommended. Photic  stimulation was performed with no driving seen. IMPRESSION:  This an abnormal EEG due to the presence of frontal  intermittent delta activity suggestive of a paradoxical cortical  dysfunction. Clinical correlation is recommended. In addition,  excessive slowing was seen in theta and delta range activity with a  failure to achieve a satisfactory background rhythm suggestive of  cortical dysfunction such as seen with encephalopathies. There was no  evidence of epileptiform activity appreciated.         David Nunez MD    D: 2019 19:59:45       T: 2019 20:04:36     MARIA [email or web address]  Job#: [id number]     Doc#: [id number]    CC:

## 2019-05-08 NOTE — DISCHARGE SUMMARY
Hospital Medicine Discharge Summary      Patient Identification:   Eboni Woodruff   : 1955  MRN: [id number]   Account: [de-identified]      Patient's PCP: BA Pérez NP    Admit Date: 2019     Discharge Date: 2019     Admitting Physician: Dewey Rueda DO     Discharge Physician: Chanel Dowling MD     Discharge Diagnoses: Active Hospital Problems    Diagnosis Date Noted    Right arm weakness [R29.898] 2019    Acute confusional state [F05]     Paresthesias [R20.2]     Stroke-like symptom [R29.90] 2019    Cocaine abuse ([facility] [address] [F14.10]        The patient was seen and examined on day of discharge and this discharge summary is in conjunction with any daily progress note from day of discharge. Hospital Course:   Eboni Woodruff is a 66 y.o. female admitted to [address] [place] [address] on 2019 for slurred speech, numbness/tingling of her right arm. This was of uncertain duration. She came to ER and was admitted. She also complained of pain in the right arm. She has a hx of substance abuse. She underwent neuro workup including MRI brain, MRA head and neck which were unremarkable. Lord Amador She was seen by Dr Ruben Haider whose note is incomplete but did mention strokelike sxs. He will follow up with the pt    There being no cva on MRI and no significant vascular stenosis, she was discharged. She did request pain medication for pain in her right arm which was not explainable. Her PDMP was reviewed which only occasional opioid use. She was issued a small prescription of tramadol. She did have a c spine MRI in  showing no significant disease.            Exam:     Vitals:  Vitals:    19 0330 19 0826 19 1119 19 1613   BP: 131/70 129/83 120/72 139/83   Pulse: 61 63 56 63   Resp: 18 18 18 16   Temp: 98 °F (36.7 °C) 97.7 °F (36.5 °C) 97.8 °F (36.6 °C) 98.6 °F (37 °C)   TempSrc: Oral Oral Oral    SpO2: 93% 92% 94% 94%   Weight: Height:         Weight: Weight: 162 lb 5 oz (73.6 kg)     24 hour intake/output:No intake or output data in the 24 hours ending 05/08/19 0719      General appearance:  No apparent distress, appears stated age and cooperative. HEENT:  Normal cephalic, atraumatic without obvious deformity. Pupils equal, round, and reactive to light. Extra ocular muscles intact. Conjunctivae/corneas clear. Neck: Supple, with full range of motion. No jugular venous distention. Trachea midline. Respiratory:  Normal respiratory effort. Clear to auscultation, bilaterally without Rales/Wheezes/Rhonchi. Cardiovascular:  Regular rate and rhythm with normal S1/S2 without murmurs, rubs or gallops. Abdomen: Soft, non-tender, non-distended with normal bowel sounds. Musculoskeletal:  No clubbing, cyanosis or edema bilaterally. Full range of motion without deformity. Skin: Skin color, texture, turgor normal.  No rashes or lesions. Neurologic:  Neurovascularly intact without any focal sensory/motor deficits. Cranial nerves: II-XII intact, grossly non-focal.  Psychiatric:  Alert and oriented, thought content appropriate, normal insight  Capillary Refill: Brisk,< 3 seconds   Peripheral Pulses: +2 palpable, equal bilaterally       Labs: For convenience and continuity at follow-up the following most recent labs are provided:      CBC:    Lab Results   Component Value Date    WBC 8.9 05/05/2019    HGB 11.8 05/05/2019    HCT 37.3 05/05/2019     05/05/2019       Renal:    Lab Results   Component Value Date     05/05/2019    K 4.4 05/05/2019     05/05/2019    CO2 25 05/05/2019    BUN 20 05/05/2019    CREATININE 1.1 05/05/2019    CALCIUM 9.7 05/05/2019    PHOS 3.2 11/17/2017         Significant Diagnostic Studies    Radiology:   RADIOLOGY REPORT   Final Result      MRA Neck W WO Contrast   Final Result    Normal MRA of the neck. **This report has been created using voice recognition software.  It may contain minor errors which are inherent in voice recognition technology. **      Final report electronically signed by Dr. Garrett Shearer on 5/6/2019 2:39 PM      MRI brain without contrast   Final Result       1. No evidence of an acute infarct. 2. Mild signal hyperintensity in the corticospinal tracts bilaterally. This can be seen in normal healthy patients. This can also be seen in amyotrophic lateral sclerosis. Clinical correlation is recommended. **This report has been created using voice recognition software. It may contain minor errors which are inherent in voice recognition technology. **      Final report electronically signed by Dr. Garrett Shearer on 5/6/2019 2:20 PM      MRA Head WO Contrast   Final Result    Normal MRA of the brain. **This report has been created using voice recognition software. It may contain minor errors which are inherent in voice recognition technology. **      Final report electronically signed by Dr. Garrett Shearer on 5/6/2019 2:22 PM      CT Head WO Contrast   Final Result   1. No acute intracranial hemorrhage or mass effect. **This report has been created using voice recognition software. It may contain minor errors which are inherent in voice recognition technology. **         Final report electronically signed by Dr. Ada Gillis on 5/5/2019 6:44 PM      CT Cervical Spine WO Contrast   Final Result   1. No acute fracture or malalignment. **This report has been created using voice recognition software. It may contain minor errors which are inherent in voice recognition technology. **      Final report electronically signed by Dr. Ada Gillis on 5/5/2019 6:49 PM      XR CHEST STANDARD (2 VW)   Final Result   1. No acute cardiopulmonary disease. **This report has been created using voice recognition software. It may contain minor errors which are inherent in voice recognition technology. **      Final report electronically signed by Dr. Zachary Mckay on 5/5/2019 6:25 PM             Consults:     IP CONSULT TO NEUROLOGY  IP CONSULT TO SOCIAL WORK    Disposition: Home  Condition at Discharge: Stable    Code Status:  Prior     Patient Instructions:    Discharge lab work: Activity: activity as tolerated  Diet: No diet orders on file      Follow-up visits:   Priscilla Aragon, BA - CNP  [address] [place]  [phone]      The office will call you with a follow up appointment. If they do not call you within 2 days call and make a follow up within a week please. MD Tere Hines [facility]  [phone]      The office will call you with a follow up appointment. If you have not heard from them in next 2 days give them a call.           Discharge Medications:      Jae Rivera"   Home Medication Instructions [id number]    Printed on:05/08/19 0719   Medication Information                      albuterol (PROVENTIL HFA;VENTOLIN HFA) 108 (90 BASE) MCG/ACT inhaler  Inhale 2 puffs into the lungs every 6 hours as needed for Wheezing              Albuterol Sulfate (VENTOLIN HFA IN)  Inhale 90 mg into the lungs 2 times daily             alosetron (LOTRONEX) 0.5 MG tablet  Take 0.5 mg by mouth 2 times daily             ARIPiprazole (ABILIFY) 2 MG tablet  Take 1 tablet by mouth daily             aspirin 81 MG chewable tablet  Take 1 tablet by mouth daily             benzonatate (TESSALON PERLES) 100 MG capsule  Take 100 mg by mouth 3 times daily             carvedilol (COREG) 3.125 MG tablet  TAKE 1 TABLET BY MOUTH 2 TIMES DAILY (WITH MEALS)             clopidogrel (PLAVIX) 75 MG tablet  TAKE 1 TABLET BY MOUTH DAILY             Cyanocobalamin (VITAMIN B-12) 3000 MCG SUBL  Place 1 tablet under the tongue Daily             ferrous sulfate 325 (65 FE) MG tablet  Take 325 mg by mouth daily (with breakfast)              fluticasone (FLONASE) 50 MCG/ACT nasal spray  1 spray by Each Nare route daily             folic acid (FOLVITE) 1 MG tablet  Take 1 tablet by mouth daily             hydrochlorothiazide (HYDRODIURIL) 25 MG tablet  TAKE 1 TABLET BY MOUTH TWICE A DAY             hydrOXYzine (VISTARIL) 50 MG capsule  Take 50 mg by mouth 3 times daily as needed for Itching or Anxiety              hyoscyamine  MCG TBCR extended release tablet  Take by mouth 2 times daily             isosorbide dinitrate (ISORDIL) 10 MG tablet  TAKE 1 TABLET BY MOUTH 3 TIMES DAILY             levothyroxine (SYNTHROID) 150 MCG tablet  Take 150 mcg by mouth once a week Weekly on Sunday             levothyroxine (SYNTHROID) 75 MCG tablet  Take 75 mcg by mouth Six times weekly everyday except Sunday take 150 mcg.             lipase-protease-amylase (ZENPEP) 5000 units delayed release capsule  Take 2 capsules by mouth 3 times daily (with meals) And 1 capsule with snacks             lubiprostone (AMITIZA) 24 MCG capsule  Take 24 mcg by mouth 2 times daily (with meals)             mirtazapine (REMERON) 15 MG tablet  Take 1 tablet by mouth nightly             nabumetone (RELAFEN) 500 MG tablet  Take 1 tablet by mouth 2 times daily             nystatin (MYCOSTATIN) POWD powder  Apply 1 each topically 2 times daily             nystatin-triamcinolone (MYCOLOG II) 255047-3.1 UNIT/GM-% cream  Apply topically 4 times daily. pantoprazole (PROTONIX) 40 MG tablet  Take 1 tablet by mouth daily Indications: Gastroesophageal Reflux Disease             QUEtiapine (SEROQUEL XR) 300 MG extended release tablet  Take 2 tablets by mouth nightly             RaNITidine HCl (RANITIDINE 75 PO)  Take 150 mg by mouth 2 times daily             traMADol (ULTRAM) 50 MG tablet  Take 1 tablet by mouth every 6 hours as needed for Pain for up to 3 days.              traZODone (DESYREL) 100 MG tablet  Take 2 tablets by mouth nightly             TUDORZA PRESSAIR 400 MCG/ACT AEPB inhaler  1 puff 2 times daily                  Time Spent on discharge is more than 20 minutes in the examination, evaluation, counseling and review of medications and discharge plan. Signed: Thank you BA Ontiveros NP for the opportunity to be involved in this patient's care.     Electronically signed by Hanna Barragan MD on 5/8/2019 at 7:19 AM

## 2019-05-13 ENCOUNTER — HOSPITAL ENCOUNTER (OUTPATIENT)
Age: 64
Setting detail: OBSERVATION
Discharge: HOME | End: 2019-05-14
Attending: EMERGENCY MEDICINE | Admitting: INTERNAL MEDICINE
Payer: COMMERCIAL

## 2019-05-13 ENCOUNTER — APPOINTMENT (OUTPATIENT)
Dept: CT IMAGING | Age: 64
End: 2019-05-13
Payer: COMMERCIAL

## 2019-05-13 ENCOUNTER — APPOINTMENT (OUTPATIENT)
Dept: GENERAL RADIOLOGY | Age: 64
End: 2019-05-13
Payer: COMMERCIAL

## 2019-05-13 DIAGNOSIS — F05 ACUTE CONFUSIONAL STATE: ICD-10-CM

## 2019-05-13 DIAGNOSIS — F14.10 NONDEPENDENT COCAINE ABUSE (HCC): ICD-10-CM

## 2019-05-13 DIAGNOSIS — R09.02 HYPOXIA: ICD-10-CM

## 2019-05-13 DIAGNOSIS — J44.1 COPD EXACERBATION (HCC): Primary | ICD-10-CM

## 2019-05-13 DIAGNOSIS — N39.0 URINARY TRACT INFECTION WITHOUT HEMATURIA, SITE UNSPECIFIED: ICD-10-CM

## 2019-05-13 LAB
ALBUMIN SERPL-MCNC: 4 G/DL (ref 3.5–5.1)
ALP BLD-CCNC: 59 U/L (ref 38–126)
ALT SERPL-CCNC: 26 U/L (ref 11–66)
ANION GAP SERPL CALCULATED.3IONS-SCNC: 14 MEQ/L (ref 8–16)
AST SERPL-CCNC: 27 U/L (ref 5–40)
ATYPICAL LYMPHOCYTES: ABNORMAL %
BASOPHILIA: ABNORMAL
BASOPHILS # BLD: 0.7 %
BASOPHILS ABSOLUTE: 0 THOU/MM3 (ref 0–0.1)
BILIRUB SERPL-MCNC: 0.2 MG/DL (ref 0.3–1.2)
BUN BLDV-MCNC: 14 MG/DL (ref 7–22)
CALCIUM SERPL-MCNC: 10.8 MG/DL (ref 8.5–10.5)
CHLORIDE BLD-SCNC: 93 MEQ/L (ref 98–111)
CO2: 29 MEQ/L (ref 23–33)
CREAT SERPL-MCNC: 0.9 MG/DL (ref 0.4–1.2)
EKG ATRIAL RATE: 65 BPM
EKG P AXIS: 36 DEGREES
EKG P-R INTERVAL: 150 MS
EKG Q-T INTERVAL: 466 MS
EKG QRS DURATION: 92 MS
EKG QTC CALCULATION (BAZETT): 484 MS
EKG T AXIS: 63 DEGREES
EKG VENTRICULAR RATE: 65 BPM
EOSINOPHIL # BLD: 1 %
EOSINOPHILS ABSOLUTE: 0.1 THOU/MM3 (ref 0–0.4)
ERYTHROCYTE [DISTWIDTH] IN BLOOD BY AUTOMATED COUNT: 14 % (ref 11.5–14.5)
ERYTHROCYTE [DISTWIDTH] IN BLOOD BY AUTOMATED COUNT: 38.1 FL (ref 35–45)
GFR SERPL CREATININE-BSD FRML MDRD: 63 ML/MIN/1.73M2
GLUCOSE BLD-MCNC: 114 MG/DL (ref 70–108)
HCT VFR BLD CALC: 36.9 % (ref 37–47)
HEMOGLOBIN: 11.6 GM/DL (ref 12–16)
IMMATURE GRANS (ABS): 0.03 THOU/MM3 (ref 0–0.07)
IMMATURE GRANULOCYTES: 0.4 %
LYMPHOCYTES # BLD: 47.5 %
LYMPHOCYTES ABSOLUTE: 3.3 THOU/MM3 (ref 1–4.8)
MAGNESIUM: 1.7 MG/DL (ref 1.6–2.4)
MCH RBC QN AUTO: 24 PG (ref 26–33)
MCHC RBC AUTO-ENTMCNC: 31.4 GM/DL (ref 32.2–35.5)
MCV RBC AUTO: 76.4 FL (ref 81–99)
MONOCYTES # BLD: 8.2 %
MONOCYTES ABSOLUTE: 0.6 THOU/MM3 (ref 0.4–1.3)
NUCLEATED RED BLOOD CELLS: 0 /100 WBC
OSMOLALITY CALCULATION: 273.3 MOSMOL/KG (ref 275–300)
PLATELET # BLD: 370 THOU/MM3 (ref 130–400)
PMV BLD AUTO: 8.5 FL (ref 9.4–12.4)
POIKILOCYTES: ABNORMAL
POTASSIUM SERPL-SCNC: 3.9 MEQ/L (ref 3.5–5.2)
RBC # BLD: 4.83 MILL/MM3 (ref 4.2–5.4)
SCAN OF BLOOD SMEAR: NORMAL
SEG NEUTROPHILS: 42.2 %
SEGMENTED NEUTROPHILS ABSOLUTE COUNT: 2.9 THOU/MM3 (ref 1.8–7.7)
SODIUM BLD-SCNC: 136 MEQ/L (ref 135–145)
TOTAL PROTEIN: 7.4 G/DL (ref 6.1–8)
TROPONIN T: < 0.01 NG/ML
TSH SERPL DL<=0.05 MIU/L-ACNC: 2.44 UIU/ML (ref 0.4–4.2)
WBC # BLD: 6.9 THOU/MM3 (ref 4.8–10.8)

## 2019-05-13 PROCEDURE — 36415 COLL VENOUS BLD VENIPUNCTURE: CPT

## 2019-05-13 PROCEDURE — 99219 PR INITIAL OBSERVATION CARE/DAY 50 MINUTES: CPT | Performed by: INTERNAL MEDICINE

## 2019-05-13 PROCEDURE — 93010 ELECTROCARDIOGRAM REPORT: CPT | Performed by: INTERNAL MEDICINE

## 2019-05-13 PROCEDURE — 71046 X-RAY EXAM CHEST 2 VIEWS: CPT

## 2019-05-13 PROCEDURE — 80053 COMPREHEN METABOLIC PANEL: CPT

## 2019-05-13 PROCEDURE — 84484 ASSAY OF TROPONIN QUANT: CPT

## 2019-05-13 PROCEDURE — 83735 ASSAY OF MAGNESIUM: CPT

## 2019-05-13 PROCEDURE — 70450 CT HEAD/BRAIN W/O DYE: CPT

## 2019-05-13 PROCEDURE — 84443 ASSAY THYROID STIM HORMONE: CPT

## 2019-05-13 PROCEDURE — 6370000000 HC RX 637 (ALT 250 FOR IP): Performed by: EMERGENCY MEDICINE

## 2019-05-13 PROCEDURE — 94640 AIRWAY INHALATION TREATMENT: CPT

## 2019-05-13 PROCEDURE — 93005 ELECTROCARDIOGRAM TRACING: CPT | Performed by: EMERGENCY MEDICINE

## 2019-05-13 PROCEDURE — 99285 EMERGENCY DEPT VISIT HI MDM: CPT

## 2019-05-13 PROCEDURE — 85025 COMPLETE CBC W/AUTO DIFF WBC: CPT

## 2019-05-13 RX ORDER — IPRATROPIUM BROMIDE AND ALBUTEROL SULFATE 2.5; .5 MG/3ML; MG/3ML
1 SOLUTION RESPIRATORY (INHALATION) ONCE
Status: COMPLETED | OUTPATIENT
Start: 2019-05-14 | End: 2019-05-13

## 2019-05-13 RX ORDER — PSEUDOEPHEDRINE HYDROCHLORIDE 30 MG/1
30 TABLET ORAL ONCE
Status: COMPLETED | OUTPATIENT
Start: 2019-05-14 | End: 2019-05-14

## 2019-05-13 RX ADMIN — IPRATROPIUM BROMIDE AND ALBUTEROL SULFATE 1 AMPULE: .5; 3 SOLUTION RESPIRATORY (INHALATION) at 23:59

## 2019-05-13 ASSESSMENT — PAIN DESCRIPTION - LOCATION: LOCATION: CHEST;HEAD

## 2019-05-13 ASSESSMENT — PAIN SCALES - GENERAL: PAINLEVEL_OUTOF10: 10

## 2019-05-13 ASSESSMENT — PAIN DESCRIPTION - DESCRIPTORS: DESCRIPTORS: HEADACHE;TIGHTNESS

## 2019-05-13 ASSESSMENT — PAIN DESCRIPTION - PAIN TYPE: TYPE: ACUTE PAIN

## 2019-05-13 ASSESSMENT — PAIN DESCRIPTION - ORIENTATION: ORIENTATION: RIGHT

## 2019-05-13 NOTE — ED TRIAGE NOTES
Pt comes to the ED with SOB and cough for the past couple of weeks. Pt has a hx of COPD. Pt states her inhalers haven't been working.

## 2019-05-14 VITALS
OXYGEN SATURATION: 92 % | HEIGHT: 64 IN | SYSTOLIC BLOOD PRESSURE: 113 MMHG | TEMPERATURE: 97.5 F | HEART RATE: 55 BPM | WEIGHT: 160 LBS | BODY MASS INDEX: 27.31 KG/M2 | RESPIRATION RATE: 20 BRPM | DIASTOLIC BLOOD PRESSURE: 60 MMHG

## 2019-05-14 LAB
ALLEN TEST: POSITIVE
AMPHETAMINE+METHAMPHETAMINE URINE SCREEN: NEGATIVE
BACTERIA: ABNORMAL
BARBITURATE QUANTITATIVE URINE: NEGATIVE
BASE EXCESS (CALCULATED): 4.6 MMOL/L (ref -2.5–2.5)
BENZODIAZEPINE QUANTITATIVE URINE: NEGATIVE
BILIRUBIN URINE: NEGATIVE
BLOOD, URINE: ABNORMAL
CANNABINOID QUANTITATIVE URINE: NEGATIVE
CASTS: ABNORMAL /LPF
CASTS: ABNORMAL /LPF
CHARACTER, URINE: ABNORMAL
COCAINE METABOLITE QUANTITATIVE URINE: POSITIVE
COLLECTED BY:: ABNORMAL
COLOR: YELLOW
CRYSTALS: ABNORMAL
DEVICE: ABNORMAL
EPITHELIAL CELLS, UA: ABNORMAL /HPF
GLUCOSE, URINE: NEGATIVE MG/DL
HCO3: 29 MMOL/L (ref 23–28)
IFIO2: 2
KETONES, URINE: NEGATIVE
LEUKOCYTE ESTERASE, URINE: ABNORMAL
MISCELLANEOUS LAB TEST RESULT: ABNORMAL
NITRITE, URINE: NEGATIVE
O2 SATURATION: 95 %
OPIATES, URINE: NEGATIVE
OXYCODONE: NEGATIVE
PCO2: 42 MMHG (ref 35–45)
PH BLOOD GAS: 7.45 (ref 7.35–7.45)
PH UA: 6 (ref 5–9)
PHENCYCLIDINE QUANTITATIVE URINE: NEGATIVE
PO2: 70 MMHG (ref 71–104)
PROTEIN UA: NEGATIVE MG/DL
RBC URINE: ABNORMAL /HPF
REASON FOR REJECTION: NORMAL
REJECTED TEST: NORMAL
RENAL EPITHELIAL, UA: ABNORMAL
SOURCE, BLOOD GAS: ABNORMAL
SPECIFIC GRAVITY UA: 1.02 (ref 1–1.03)
TROPONIN T: < 0.01 NG/ML
UROBILINOGEN, URINE: 0.2 EU/DL (ref 0–1)
WBC UA: > 200 /HPF
YEAST: ABNORMAL

## 2019-05-14 PROCEDURE — G0378 HOSPITAL OBSERVATION PER HR: HCPCS

## 2019-05-14 PROCEDURE — 6370000000 HC RX 637 (ALT 250 FOR IP): Performed by: INTERNAL MEDICINE

## 2019-05-14 PROCEDURE — 36415 COLL VENOUS BLD VENIPUNCTURE: CPT

## 2019-05-14 PROCEDURE — 6360000002 HC RX W HCPCS: Performed by: EMERGENCY MEDICINE

## 2019-05-14 PROCEDURE — 99217 PR OBSERVATION CARE DISCHARGE MANAGEMENT: CPT | Performed by: HOSPITALIST

## 2019-05-14 PROCEDURE — 2580000003 HC RX 258: Performed by: EMERGENCY MEDICINE

## 2019-05-14 PROCEDURE — 94760 N-INVAS EAR/PLS OXIMETRY 1: CPT

## 2019-05-14 PROCEDURE — 2500000003 HC RX 250 WO HCPCS: Performed by: INTERNAL MEDICINE

## 2019-05-14 PROCEDURE — 6360000002 HC RX W HCPCS: Performed by: INTERNAL MEDICINE

## 2019-05-14 PROCEDURE — 84484 ASSAY OF TROPONIN QUANT: CPT

## 2019-05-14 PROCEDURE — 6370000000 HC RX 637 (ALT 250 FOR IP): Performed by: EMERGENCY MEDICINE

## 2019-05-14 PROCEDURE — 80307 DRUG TEST PRSMV CHEM ANLYZR: CPT

## 2019-05-14 PROCEDURE — 96372 THER/PROPH/DIAG INJ SC/IM: CPT

## 2019-05-14 PROCEDURE — 81001 URINALYSIS AUTO W/SCOPE: CPT

## 2019-05-14 PROCEDURE — 36600 WITHDRAWAL OF ARTERIAL BLOOD: CPT

## 2019-05-14 PROCEDURE — 96365 THER/PROPH/DIAG IV INF INIT: CPT

## 2019-05-14 PROCEDURE — 82803 BLOOD GASES ANY COMBINATION: CPT

## 2019-05-14 PROCEDURE — 94640 AIRWAY INHALATION TREATMENT: CPT

## 2019-05-14 PROCEDURE — 2709999900 HC NON-CHARGEABLE SUPPLY

## 2019-05-14 PROCEDURE — 2580000003 HC RX 258: Performed by: INTERNAL MEDICINE

## 2019-05-14 RX ORDER — ACETAMINOPHEN 325 MG/1
650 TABLET ORAL EVERY 4 HOURS PRN
Status: DISCONTINUED | OUTPATIENT
Start: 2019-05-14 | End: 2019-05-14 | Stop reason: HOSPADM

## 2019-05-14 RX ORDER — ONDANSETRON 2 MG/ML
4 INJECTION INTRAMUSCULAR; INTRAVENOUS EVERY 6 HOURS PRN
Status: DISCONTINUED | OUTPATIENT
Start: 2019-05-14 | End: 2019-05-14 | Stop reason: HOSPADM

## 2019-05-14 RX ORDER — SODIUM CHLORIDE 0.9 % (FLUSH) 0.9 %
10 SYRINGE (ML) INJECTION PRN
Status: DISCONTINUED | OUTPATIENT
Start: 2019-05-14 | End: 2019-05-14 | Stop reason: HOSPADM

## 2019-05-14 RX ORDER — MIRTAZAPINE 15 MG/1
15 TABLET, FILM COATED ORAL NIGHTLY
Status: DISCONTINUED | OUTPATIENT
Start: 2019-05-14 | End: 2019-05-14 | Stop reason: HOSPADM

## 2019-05-14 RX ORDER — BIOTIN 10 MG
1 TABLET ORAL DAILY
Qty: 30 TABLET | Refills: 2 | Status: SHIPPED | OUTPATIENT
Start: 2019-05-14 | End: 2019-12-11

## 2019-05-14 RX ORDER — FAMOTIDINE 20 MG/1
20 TABLET, FILM COATED ORAL 2 TIMES DAILY
Status: DISCONTINUED | OUTPATIENT
Start: 2019-05-14 | End: 2019-05-14 | Stop reason: HOSPADM

## 2019-05-14 RX ORDER — HYOSCYAMINE SULFATE EXTENDED-RELEASE 0.38 MG/1
375 TABLET ORAL 2 TIMES DAILY
Status: DISCONTINUED | OUTPATIENT
Start: 2019-05-14 | End: 2019-05-14 | Stop reason: HOSPADM

## 2019-05-14 RX ORDER — ALOSETRON HYDROCHLORIDE 0.5 MG/1
0.5 TABLET, FILM COATED ORAL 2 TIMES DAILY
Status: DISCONTINUED | OUTPATIENT
Start: 2019-05-14 | End: 2019-05-14 | Stop reason: HOSPADM

## 2019-05-14 RX ORDER — ALBUTEROL SULFATE 2.5 MG/3ML
2.5 SOLUTION RESPIRATORY (INHALATION)
Status: DISCONTINUED | OUTPATIENT
Start: 2019-05-14 | End: 2019-05-14 | Stop reason: HOSPADM

## 2019-05-14 RX ORDER — CARVEDILOL 3.12 MG/1
3.12 TABLET ORAL 2 TIMES DAILY WITH MEALS
Status: DISCONTINUED | OUTPATIENT
Start: 2019-05-14 | End: 2019-05-14 | Stop reason: HOSPADM

## 2019-05-14 RX ORDER — CELECOXIB 200 MG/1
200 CAPSULE ORAL DAILY
Status: DISCONTINUED | OUTPATIENT
Start: 2019-05-14 | End: 2019-05-14 | Stop reason: HOSPADM

## 2019-05-14 RX ORDER — BUTALBITAL, ACETAMINOPHEN AND CAFFEINE 50; 325; 40 MG/1; MG/1; MG/1
1 TABLET ORAL EVERY 4 HOURS PRN
Status: DISCONTINUED | OUTPATIENT
Start: 2019-05-14 | End: 2019-05-14 | Stop reason: HOSPADM

## 2019-05-14 RX ORDER — PANTOPRAZOLE SODIUM 40 MG/1
40 TABLET, DELAYED RELEASE ORAL DAILY
Status: DISCONTINUED | OUTPATIENT
Start: 2019-05-14 | End: 2019-05-14 | Stop reason: HOSPADM

## 2019-05-14 RX ORDER — SODIUM CHLORIDE 0.9 % (FLUSH) 0.9 %
10 SYRINGE (ML) INJECTION EVERY 12 HOURS SCHEDULED
Status: DISCONTINUED | OUTPATIENT
Start: 2019-05-14 | End: 2019-05-14 | Stop reason: HOSPADM

## 2019-05-14 RX ORDER — FOLIC ACID 1 MG/1
1 TABLET ORAL DAILY
Status: DISCONTINUED | OUTPATIENT
Start: 2019-05-14 | End: 2019-05-14 | Stop reason: HOSPADM

## 2019-05-14 RX ORDER — HYDROXYZINE PAMOATE 50 MG/1
50 CAPSULE ORAL 3 TIMES DAILY PRN
Status: DISCONTINUED | OUTPATIENT
Start: 2019-05-14 | End: 2019-05-14 | Stop reason: HOSPADM

## 2019-05-14 RX ORDER — ASPIRIN 81 MG/1
81 TABLET, CHEWABLE ORAL DAILY
Status: DISCONTINUED | OUTPATIENT
Start: 2019-05-14 | End: 2019-05-14 | Stop reason: HOSPADM

## 2019-05-14 RX ORDER — TRAZODONE HYDROCHLORIDE 100 MG/1
200 TABLET ORAL NIGHTLY
Status: DISCONTINUED | OUTPATIENT
Start: 2019-05-14 | End: 2019-05-14 | Stop reason: HOSPADM

## 2019-05-14 RX ORDER — ALBUTEROL SULFATE 90 UG/1
2 AEROSOL, METERED RESPIRATORY (INHALATION) EVERY 6 HOURS PRN
Status: DISCONTINUED | OUTPATIENT
Start: 2019-05-14 | End: 2019-05-14 | Stop reason: HOSPADM

## 2019-05-14 RX ORDER — LUBIPROSTONE 24 UG/1
24 CAPSULE, GELATIN COATED ORAL 2 TIMES DAILY WITH MEALS
Status: DISCONTINUED | OUTPATIENT
Start: 2019-05-14 | End: 2019-05-14 | Stop reason: HOSPADM

## 2019-05-14 RX ORDER — CLOPIDOGREL BISULFATE 75 MG/1
75 TABLET ORAL DAILY
Status: DISCONTINUED | OUTPATIENT
Start: 2019-05-14 | End: 2019-05-14 | Stop reason: HOSPADM

## 2019-05-14 RX ORDER — BIOTIN 10 MG
1 TABLET ORAL DAILY
Status: DISCONTINUED | OUTPATIENT
Start: 2019-05-14 | End: 2019-05-14 | Stop reason: CLARIF

## 2019-05-14 RX ORDER — BENZONATATE 100 MG/1
100 CAPSULE ORAL 3 TIMES DAILY
Status: DISCONTINUED | OUTPATIENT
Start: 2019-05-14 | End: 2019-05-14 | Stop reason: HOSPADM

## 2019-05-14 RX ORDER — FLUTICASONE PROPIONATE 50 MCG
1 SPRAY, SUSPENSION (ML) NASAL DAILY
Status: DISCONTINUED | OUTPATIENT
Start: 2019-05-14 | End: 2019-05-14 | Stop reason: HOSPADM

## 2019-05-14 RX ORDER — PREDNISONE 20 MG/1
40 TABLET ORAL DAILY
Status: DISCONTINUED | OUTPATIENT
Start: 2019-05-14 | End: 2019-05-14 | Stop reason: HOSPADM

## 2019-05-14 RX ORDER — QUETIAPINE 300 MG/1
600 TABLET, FILM COATED, EXTENDED RELEASE ORAL NIGHTLY
Status: DISCONTINUED | OUTPATIENT
Start: 2019-05-14 | End: 2019-05-14 | Stop reason: HOSPADM

## 2019-05-14 RX ORDER — ARIPIPRAZOLE 2 MG/1
2 TABLET ORAL DAILY
Status: DISCONTINUED | OUTPATIENT
Start: 2019-05-14 | End: 2019-05-14 | Stop reason: HOSPADM

## 2019-05-14 RX ORDER — PREDNISONE 20 MG/1
40 TABLET ORAL DAILY
Qty: 4 TABLET | Refills: 0 | Status: ON HOLD | OUTPATIENT
Start: 2019-05-15 | End: 2019-05-23 | Stop reason: HOSPADM

## 2019-05-14 RX ORDER — LANOLIN ALCOHOL/MO/W.PET/CERES
1000 CREAM (GRAM) TOPICAL DAILY
Status: DISCONTINUED | OUTPATIENT
Start: 2019-05-14 | End: 2019-05-14 | Stop reason: HOSPADM

## 2019-05-14 RX ORDER — ISOSORBIDE DINITRATE 10 MG/1
10 TABLET ORAL 3 TIMES DAILY
Status: DISCONTINUED | OUTPATIENT
Start: 2019-05-14 | End: 2019-05-14 | Stop reason: HOSPADM

## 2019-05-14 RX ORDER — LEVOTHYROXINE SODIUM 0.07 MG/1
75 TABLET ORAL DAILY
Status: DISCONTINUED | OUTPATIENT
Start: 2019-05-14 | End: 2019-05-14 | Stop reason: HOSPADM

## 2019-05-14 RX ORDER — FERROUS SULFATE 325(65) MG
325 TABLET ORAL
Status: DISCONTINUED | OUTPATIENT
Start: 2019-05-14 | End: 2019-05-14 | Stop reason: HOSPADM

## 2019-05-14 RX ORDER — HYDROCHLOROTHIAZIDE 25 MG/1
25 TABLET ORAL 2 TIMES DAILY
Status: DISCONTINUED | OUTPATIENT
Start: 2019-05-14 | End: 2019-05-14 | Stop reason: HOSPADM

## 2019-05-14 RX ADMIN — PREDNISONE 40 MG: 20 TABLET ORAL at 09:08

## 2019-05-14 RX ADMIN — BENZONATATE 100 MG: 100 CAPSULE ORAL at 09:08

## 2019-05-14 RX ADMIN — ASPIRIN 81 MG 81 MG: 81 TABLET ORAL at 09:09

## 2019-05-14 RX ADMIN — CARVEDILOL 3.12 MG: 3.12 TABLET, FILM COATED ORAL at 09:09

## 2019-05-14 RX ADMIN — LEVOTHYROXINE SODIUM 75 MCG: 75 TABLET ORAL at 06:54

## 2019-05-14 RX ADMIN — FOLIC ACID 1 MG: 1 TABLET ORAL at 09:08

## 2019-05-14 RX ADMIN — ARIPIPRAZOLE 2 MG: 2 TABLET ORAL at 09:08

## 2019-05-14 RX ADMIN — NYSTATIN AND TRIAMCINOLONE ACETONIDE: 100000; 1 CREAM TOPICAL at 09:08

## 2019-05-14 RX ADMIN — PANCRELIPASE LIPASE, PANCRELIPASE PROTEASE, PANCRELIPASE AMYLASE 1 CAPSULE: 10000; 32000; 42000 CAPSULE, DELAYED RELEASE ORAL at 09:16

## 2019-05-14 RX ADMIN — PANCRELIPASE LIPASE, PANCRELIPASE PROTEASE, PANCRELIPASE AMYLASE 1 CAPSULE: 10000; 32000; 42000 CAPSULE, DELAYED RELEASE ORAL at 12:16

## 2019-05-14 RX ADMIN — NYSTATIN AND TRIAMCINOLONE ACETONIDE: 100000; 1 CREAM TOPICAL at 13:00

## 2019-05-14 RX ADMIN — PSEUDOEPHEDRINE HCL 30 MG: 30 TABLET, FILM COATED ORAL at 01:07

## 2019-05-14 RX ADMIN — ALBUTEROL SULFATE 2.5 MG: 2.5 SOLUTION RESPIRATORY (INHALATION) at 08:15

## 2019-05-14 RX ADMIN — BUTALBITAL, ACETAMINOPHEN, AND CAFFEINE 1 TABLET: 50; 325; 40 TABLET ORAL at 09:17

## 2019-05-14 RX ADMIN — ISOSORBIDE DINITRATE 10 MG: 10 TABLET ORAL at 09:08

## 2019-05-14 RX ADMIN — SODIUM CHLORIDE, PRESERVATIVE FREE 10 ML: 5 INJECTION INTRAVENOUS at 10:00

## 2019-05-14 RX ADMIN — PANTOPRAZOLE SODIUM 40 MG: 40 TABLET, DELAYED RELEASE ORAL at 06:55

## 2019-05-14 RX ADMIN — Medication 1000 MCG: at 06:54

## 2019-05-14 RX ADMIN — ENOXAPARIN SODIUM 40 MG: 40 INJECTION SUBCUTANEOUS at 09:09

## 2019-05-14 RX ADMIN — CELECOXIB 200 MG: 200 CAPSULE ORAL at 11:30

## 2019-05-14 RX ADMIN — ALBUTEROL SULFATE 2.5 MG: 2.5 SOLUTION RESPIRATORY (INHALATION) at 14:28

## 2019-05-14 RX ADMIN — FERROUS SULFATE TAB 325 MG (65 MG ELEMENTAL FE) 325 MG: 325 (65 FE) TAB at 09:08

## 2019-05-14 RX ADMIN — HYDROCHLOROTHIAZIDE 25 MG: 25 TABLET ORAL at 09:08

## 2019-05-14 RX ADMIN — TIOTROPIUM BROMIDE 18 MCG: 18 CAPSULE ORAL; RESPIRATORY (INHALATION) at 08:17

## 2019-05-14 RX ADMIN — ISOSORBIDE DINITRATE 10 MG: 10 TABLET ORAL at 14:21

## 2019-05-14 RX ADMIN — CLOPIDOGREL BISULFATE 75 MG: 75 TABLET ORAL at 09:09

## 2019-05-14 RX ADMIN — FAMOTIDINE 20 MG: 20 TABLET ORAL at 09:09

## 2019-05-14 RX ADMIN — MICONAZOLE NITRATE: 20 POWDER TOPICAL at 09:09

## 2019-05-14 RX ADMIN — CEFTRIAXONE SODIUM 1 G: 1 INJECTION, POWDER, FOR SOLUTION INTRAMUSCULAR; INTRAVENOUS at 04:32

## 2019-05-14 RX ADMIN — BENZONATATE 100 MG: 100 CAPSULE ORAL at 14:21

## 2019-05-14 RX ADMIN — HYOSCYAMINE SULFATE 375 MCG: 0.38 TABLET, EXTENDED RELEASE ORAL at 09:08

## 2019-05-14 ASSESSMENT — PAIN DESCRIPTION - LOCATION
LOCATION: HEAD
LOCATION: HEAD
LOCATION: CHEST

## 2019-05-14 ASSESSMENT — ENCOUNTER SYMPTOMS
EYE DISCHARGE: 0
EYE ITCHING: 0
COUGH: 1
SINUS PRESSURE: 0
NAUSEA: 0
SHORTNESS OF BREATH: 1
WHEEZING: 0
SORE THROAT: 0
EYE PAIN: 0
VOMITING: 0
PHOTOPHOBIA: 0
BACK PAIN: 0
ABDOMINAL DISTENTION: 0
TROUBLE SWALLOWING: 0
BLOOD IN STOOL: 0
DIARRHEA: 0
ABDOMINAL PAIN: 0
CONSTIPATION: 0
RHINORRHEA: 0
CHEST TIGHTNESS: 0
VOICE CHANGE: 0
EYE REDNESS: 0
CHOKING: 0

## 2019-05-14 ASSESSMENT — PAIN SCALES - GENERAL
PAINLEVEL_OUTOF10: 9
PAINLEVEL_OUTOF10: 7
PAINLEVEL_OUTOF10: 10
PAINLEVEL_OUTOF10: 2
PAINLEVEL_OUTOF10: 10

## 2019-05-14 ASSESSMENT — PAIN DESCRIPTION - DESCRIPTORS
DESCRIPTORS: DISCOMFORT
DESCRIPTORS: DISCOMFORT;PRESSURE

## 2019-05-14 ASSESSMENT — PAIN DESCRIPTION - PROGRESSION
CLINICAL_PROGRESSION: NOT CHANGED
CLINICAL_PROGRESSION: NOT CHANGED

## 2019-05-14 ASSESSMENT — PAIN DESCRIPTION - ORIENTATION: ORIENTATION: UPPER

## 2019-05-14 ASSESSMENT — PAIN - FUNCTIONAL ASSESSMENT
PAIN_FUNCTIONAL_ASSESSMENT: ACTIVITIES ARE NOT PREVENTED
PAIN_FUNCTIONAL_ASSESSMENT: PREVENTS OR INTERFERES SOME ACTIVE ACTIVITIES AND ADLS

## 2019-05-14 ASSESSMENT — PAIN DESCRIPTION - PAIN TYPE
TYPE: ACUTE PAIN

## 2019-05-14 ASSESSMENT — PAIN DESCRIPTION - FREQUENCY
FREQUENCY: CONTINUOUS
FREQUENCY: CONTINUOUS

## 2019-05-14 ASSESSMENT — PAIN DESCRIPTION - ONSET
ONSET: ON-GOING
ONSET: ON-GOING

## 2019-05-14 NOTE — ED NOTES
Pt to CT scan. Pt states breathing has improved.  Pt drowsy and falling asleep easily     Raoul Dover RN  05/14/19 5994

## 2019-05-14 NOTE — ED NOTES
Pt remains sleeping in bed, easily awoken, denies any needs, denies any SOB or pain at this time     Jamie Anthony, INDY  05/14/19 0706

## 2019-05-14 NOTE — ED NOTES
Pt was taken off of oxygen and pt's  Pulse ox would drop to 88-89%, pt placed back on oxygen     Kvng Rosas, RN  05/14/19 3138

## 2019-05-14 NOTE — ED NOTES
Pt sleeping in bed, no signs of distress, respirs easy and unlabored, side rails upx2, will continue to monitor     Roman Almendarez RN  05/14/19 8630

## 2019-05-14 NOTE — DISCHARGE INSTR - DIET

## 2019-05-14 NOTE — FLOWSHEET NOTE
Pt is a 65yo woman. Pt was laying in bed and stated at the beginning of the visit that she couldn't stay awake. Pt fell asleep twice early in the visit. Spiritual care to continue to offer pt support.      05/14/19 1315   Encounter Summary   Services provided to: Patient   Referral/Consult From: [facility] Unknown   Continue Visiting Yes  (5/14)   Complexity of Encounter Low   Length of Encounter 15 minutes   Routine   Type Initial   Assessment Approachable;Passive   Intervention Active listening;Sustaining presence/ Ministry of presence   Outcome Engaged in conversation

## 2019-05-14 NOTE — DISCHARGE SUMMARY
°F (35.9 °C) 97.5 °F (36.4 °C)   TempSrc: Oral  Axillary Axillary   SpO2: 94% 96% 91% 92%   Weight:       Height: 5' 4\" (1.626 m)        Weight: Weight: 160 lb (72.6 kg)     24 hour intake/output:    Intake/Output Summary (Last 24 hours) at 5/14/2019 1259  Last data filed at 5/14/2019 1000  Gross per 24 hour   Intake 110 ml   Output --   Net 110 ml           General appearance: A&O x3, Not ill or toxic, in no apparent distress  HEENT:  MADHU  EOM intact. Neck: Supple, with full range of motion. No jugular venous distention. Trachea midline. Respiratory:   NL A/E bilat with no adventitious sounds   Cardiovascular:  normal S1/S2 with no murmurs/gallops  Abdomen: Soft, non-tender, non-distended, no rigidity or peritoneal signs  Musculoskeletal: NL symmetrical A/PROM bilat U/L extremities. + Tinel's sign over R cubital tunnel  Skin: No rashes. No edema  Neurologic:  CN II-XII intact. NL symmetrical reflexes. NL gait and stance. NL Cerebellar exam. Power 5/5 all muscle groups U/L extremities. Toes downgoing  Capillary Refill: Brisk,< 3 seconds   Peripheral Pulses: +2 palpable, equal bilaterally              Labs: For convenience and continuity at follow-up the following most recent labs are provided:      CBC:    Lab Results   Component Value Date    WBC 6.9 05/13/2019    HGB 11.6 05/13/2019    HCT 36.9 05/13/2019     05/13/2019       Renal:    Lab Results   Component Value Date     05/13/2019    K 3.9 05/13/2019    K 4.4 05/05/2019    CL 93 05/13/2019    CO2 29 05/13/2019    BUN 14 05/13/2019    CREATININE 0.9 05/13/2019    CALCIUM 10.8 05/13/2019    PHOS 3.2 11/17/2017         Significant Diagnostic Studies    Radiology:   CT HEAD WO CONTRAST   Final Result      No acute ischemic infarct, hemorrhage, or mass effect. **This report has been created using voice recognition software. It may contain minor errors which are inherent in voice recognition technology. **      Final report electronically signed by Dr. Bay Casas on 5/14/2019 2:08 AM      XR CHEST STANDARD (2 VW)   Final Result   Stable radiographic appearance of the chest. No evidence of an acute process. **This report has been created using voice recognition software. It may contain minor errors which are inherent in voice recognition technology. **      Final report electronically signed by Dr. Ovidio Caldwell on 5/13/2019 7:43 PM             Consults:     None    Disposition:    [x] Home       [] TCU       [] Rehab       [] Psych       [] SNF       [] [place]       [] Other-    Condition at Discharge: Stable    Code Status:  Full Code     Patient Instructions:    Discharge lab work: N/A  Activity: activity as tolerated  Diet: DIET CARDIAC; Low Fat      Follow-up visits:   BA Morrison - CNP  [address]               Discharge Medications:      Radha Blanco"   Home Medication Instructions [id number]    Printed on:05/14/19 8045   Medication Information                      albuterol (PROVENTIL HFA;VENTOLIN HFA) 108 (90 BASE) MCG/ACT inhaler  Inhale 2 puffs into the lungs every 6 hours as needed for Wheezing              Albuterol Sulfate (VENTOLIN HFA IN)  Inhale 90 mg into the lungs 2 times daily as needed              alosetron (LOTRONEX) 0.5 MG tablet  Take 0.5 mg by mouth 2 times daily             ARIPiprazole (ABILIFY) 2 MG tablet  Take 1 tablet by mouth daily             aspirin 81 MG chewable tablet  Take 1 tablet by mouth daily             benzonatate (TESSALON PERLES) 100 MG capsule  Take 100 mg by mouth 3 times daily             carvedilol (COREG) 3.125 MG tablet  TAKE 1 TABLET BY MOUTH 2 TIMES DAILY (WITH MEALS)             clopidogrel (PLAVIX) 75 MG tablet  TAKE 1 TABLET BY MOUTH DAILY             folic acid (FOLVITE) 1 MG tablet  Take 1 tablet by mouth daily             hydrochlorothiazide (HYDRODIURIL) 25 MG tablet  TAKE 1 TABLET BY MOUTH TWICE A DAY             hydrOXYzine (VISTARIL) 50 MG capsule  Take 50 mg by mouth 3 times daily as needed for Itching or Anxiety              hyoscyamine  MCG TBCR extended release tablet  Take by mouth 2 times daily             isosorbide dinitrate (ISORDIL) 10 MG tablet  TAKE 1 TABLET BY MOUTH 3 TIMES DAILY             levothyroxine (SYNTHROID) 150 MCG tablet  Take 150 mcg by mouth once a week Weekly on Sunday             levothyroxine (SYNTHROID) 75 MCG tablet  Take 75 mcg by mouth Six times weekly everyday except Sunday take 150 mcg.             lipase-protease-amylase (ZENPEP) 5000 units delayed release capsule  Take 2 capsules by mouth 3 times daily (with meals) And 1 capsule with snacks             lubiprostone (AMITIZA) 24 MCG capsule  Take 24 mcg by mouth 2 times daily (with meals)             mirtazapine (REMERON) 15 MG tablet  Take 1 tablet by mouth nightly             nabumetone (RELAFEN) 500 MG tablet  Take 1 tablet by mouth 2 times daily             nystatin (MYCOSTATIN) POWD powder  Apply 1 each topically 2 times daily             nystatin-triamcinolone (MYCOLOG II) 581895-9.1 UNIT/GM-% cream  Apply topically 4 times daily. pantoprazole (PROTONIX) 40 MG tablet  Take 1 tablet by mouth daily Indications: Gastroesophageal Reflux Disease             QUEtiapine (SEROQUEL XR) 300 MG extended release tablet  Take 2 tablets by mouth nightly             traZODone (DESYREL) 100 MG tablet  Take 2 tablets by mouth nightly             TUDORZA PRESSAIR 400 MCG/ACT AEPB inhaler  1 puff 2 times daily as needed (wheezing/SOB)                  Time Spent on discharge is more than 20 minutes in the examination, evaluation, counseling and review of medications and discharge plan. With RN in room, patient was updated about the treatment plan, all the questions and concerns were addressed. Alarming signs and symptoms to return to ED were explained in length. Signed: Thank you BA Stein CNP for the opportunity to be involved in this patient's care.     Electronically signed by Rowena Lucio MD on 5/14/2019 at 12:59 PM

## 2019-05-14 NOTE — CARE COORDINATION
B-12  1,000 mcg Oral Daily     Continuous Infusions:   Pertinent Info/Orders/Treatment Plan: Hospitalist following. Nebs. Inhaler. Room air-94%. Negative troponin. + cocaine. Diet: DIET CARDIAC; Low Fat   Smoking status:  reports that she has been smoking cigarettes. She started smoking about 42 years ago. She has a 15.00 pack-year smoking history. She has never used smokeless tobacco.   PCP: BA Van NP  Readmission: no  Readmission Risk Score: 0%    Discharge Planning  Current Residence:  Private Residence  Living Arrangements:  Family Members(male friend)   Support Systems:  Family Members, Friends/Neighbors  Current Services PTA:     Potential Assistance Needed:  N/A  Potential Assistance Purchasing Medications:  No  Does patient want to participate in local refill/ meds to beds program?  No  Type of Home Care Services:  None  Patient expects to be discharged to:  [de-identified] with male friend  Expected Discharge date:  05/17/19  Follow Up Appointment: Best Day/ Time:      Discharge Plan: Spoke with patient, plans to return home with friend. Reports she does not use dme. Does not use services and does not feel HH will be needed. She has transportation to [facility] and follows with Shan Meraz CNP. Denies discharge needs. Potential discharge later today per hospitalist note.       Evaluation: no

## 2019-05-14 NOTE — PROGRESS NOTES
Arrived to 5E-65 from ER. Patient came to ED because she was having difficulty breathing. Significant HX of COPD & smoking x 30 years. Ambulated to bathroom and back with staff assistance. Mildly dyspneic with exertion. SPO2 94% on room air. Lungs clear with in upper lobes, no wheezes hear. Bilat bases with rhonchi. Non-productive moist cough. Patient states she's been coughing up dark yellow, gray sputum. On breathing TX scheduled & PRN. None needed at this time. Per physician order target SPO2 is 89% or >. Oriented to room and call light system. Lying in bed with bed alarm on and call light in reach.

## 2019-05-14 NOTE — ED NOTES
Pt reassessed. Brought back to room 12, resting on cot, respirations even and unlabored. States pain on the left side of her chest 10/10 and a headaches also. Updated on POC, no complaints. SR up x2, call light in reach, telemetry continued, will continue to monitor.      Loan Tony RN  05/13/19 3896

## 2019-05-14 NOTE — ED PROVIDER NOTES
Neurological: Negative for dizziness, tremors, seizures, syncope, facial asymmetry, weakness, light-headedness, numbness and headaches. Hematological: Negative for adenopathy. Does not bruise/bleed easily. Psychiatric/Behavioral: Negative for agitation, hallucinations and suicidal ideas. The patient is not nervous/anxious. PAST MEDICAL HISTORY    has a past medical history of Allergic rhinitis, Arthritis, Bipolar disorder ([address] Blood circulation, collateral, Cancer ([address] Colon polyps, COPD (chronic obstructive pulmonary disease) ([address] Depression, Diabetes ([address] Diverticulitis of colon, GERD (gastroesophageal reflux disease), Hiatal hernia, History of colonoscopy, History of kidney stones, Hx of blood clots, Hyperlipidemia, Hypertension, Liver disease, Pneumonia, Suicidal thoughts, Thyroid disease, Vomiting, Wears dentures, and Wears glasses. SURGICAL HISTORY      has a past surgical history that includes Mandible fracture surgery (1984); shoulder surgery (2014); Colonoscopy (2011); Dilatation, esophagus; Elbow surgery (Right, 10/7/2004); Rotator cuff repair (2004, 2014); skin biopsy (2006); Cholecystectomy, laparoscopic (6/12/15); Elbow surgery (Bilateral, 2016); Carpal tunnel release (Bilateral, 2016); Abdomen surgery; Hysterectomy (1998); and Cardiac surgery.     CURRENT MEDICATIONS       Previous Medications    ALBUTEROL (PROVENTIL HFA;VENTOLIN HFA) 108 (90 BASE) MCG/ACT INHALER    Inhale 2 puffs into the lungs every 6 hours as needed for Wheezing     ALBUTEROL SULFATE (VENTOLIN HFA IN)    Inhale 90 mg into the lungs 2 times daily    ALOSETRON (LOTRONEX) 0.5 MG TABLET    Take 0.5 mg by mouth 2 times daily    ARIPIPRAZOLE (ABILIFY) 2 MG TABLET    Take 1 tablet by mouth daily    ASPIRIN 81 MG CHEWABLE TABLET    Take 1 tablet by mouth daily    BENZONATATE (TESSALON PERLES) 100 MG CAPSULE    Take 100 mg by mouth 3 times daily    CARVEDILOL (COREG) 3.125 MG TABLET    TAKE 1 TABLET BY MOUTH 2 TIMES DAILY (WITH MEALS)    CLOPIDOGREL (PLAVIX) 75 MG TABLET    TAKE 1 TABLET BY MOUTH DAILY    CYANOCOBALAMIN (VITAMIN B-12) 3000 MCG SUBL    Place 1 tablet under the tongue Daily    FERROUS SULFATE 325 (65 FE) MG TABLET    Take 325 mg by mouth daily (with breakfast)     FLUTICASONE (FLONASE) 50 MCG/ACT NASAL SPRAY    1 spray by Each Nare route daily    FOLIC ACID (FOLVITE) 1 MG TABLET    Take 1 tablet by mouth daily    HYDROCHLOROTHIAZIDE (HYDRODIURIL) 25 MG TABLET    TAKE 1 TABLET BY MOUTH TWICE A DAY    HYDROXYZINE (VISTARIL) 50 MG CAPSULE    Take 50 mg by mouth 3 times daily as needed for Itching or Anxiety     HYOSCYAMINE  MCG TBCR EXTENDED RELEASE TABLET    Take by mouth 2 times daily    ISOSORBIDE DINITRATE (ISORDIL) 10 MG TABLET    TAKE 1 TABLET BY MOUTH 3 TIMES DAILY    LEVOTHYROXINE (SYNTHROID) 150 MCG TABLET    Take 150 mcg by mouth once a week Weekly on Sunday    LEVOTHYROXINE (SYNTHROID) 75 MCG TABLET    Take 75 mcg by mouth Six times weekly everyday except Sunday take 150 mcg. LIPASE-PROTEASE-AMYLASE (ZENPEP) 5000 UNITS DELAYED RELEASE CAPSULE    Take 2 capsules by mouth 3 times daily (with meals) And 1 capsule with snacks    LUBIPROSTONE (AMITIZA) 24 MCG CAPSULE    Take 24 mcg by mouth 2 times daily (with meals)    MIRTAZAPINE (REMERON) 15 MG TABLET    Take 1 tablet by mouth nightly    NABUMETONE (RELAFEN) 500 MG TABLET    Take 1 tablet by mouth 2 times daily    NYSTATIN (MYCOSTATIN) POWD POWDER    Apply 1 each topically 2 times daily    NYSTATIN-TRIAMCINOLONE (MYCOLOG II) 150427-2.1 UNIT/GM-% CREAM    Apply topically 4 times daily.     PANTOPRAZOLE (PROTONIX) 40 MG TABLET    Take 1 tablet by mouth daily Indications: Gastroesophageal Reflux Disease    QUETIAPINE (SEROQUEL XR) 300 MG EXTENDED RELEASE TABLET    Take 2 tablets by mouth nightly    RANITIDINE HCL (RANITIDINE 75 PO)    Take 150 mg by mouth 2 times daily    TRAZODONE (DESYREL) 100 MG TABLET    Take 2 tablets by mouth nightly    TUDORZA PRESSAIR 400 MCG/ACT AEPB INHALER    1 puff 2 times daily        ALLERGIES     is allergic to seasonal.    FAMILY HISTORY     indicated that her mother is . She indicated that her father is . She indicated that her sister is alive. She indicated that her brother is alive. She indicated that the status of her maternal grandmother is unknown. She indicated that the status of her maternal grandfather is unknown. She indicated that the status of her paternal grandmother is unknown. She indicated that the status of her paternal grandfather is unknown. She indicated that the status of her maternal uncle is unknown.   family history includes Cancer in her father, mother, and sister; Depression in her father; Diabetes in her maternal grandmother; Early Death in her maternal grandfather; Heart Attack in her sister; Heart Disease in her father, maternal grandfather, maternal grandmother, maternal uncle, mother, paternal grandfather, and paternal grandmother; High Blood Pressure in her mother; High Cholesterol in her father, maternal grandfather, maternal grandmother, maternal uncle, mother, paternal grandfather, and paternal grandmother; Mental Illness in her father; Stroke in her maternal grandfather. SOCIAL HISTORY    reports that she has been smoking cigarettes. She started smoking about 42 years ago. She has a 15.00 pack-year smoking history. She has never used smokeless tobacco. She reports that she has current or past drug history. Drug: Cocaine. Frequency: 1.00 time per week. She reports that she does not drink alcohol. PHYSICAL EXAM     INITIAL VITALS:  height is 5' 4\" (1.626 m) and weight is 160 lb (72.6 kg). Her oral temperature is 97.6 °F (36.4 °C). Her blood pressure is 105/58 (abnormal) and her pulse is 57. Her respiration is 18 and oxygen saturation is 92%. Physical Exam   Constitutional: She is oriented to person, place, and time. She appears well-developed and well-nourished.  No distress. HENT:   Head: Normocephalic and atraumatic. Right Ear: External ear normal.   Left Ear: External ear normal.   Nose: Right sinus exhibits frontal sinus tenderness. Right sinus exhibits no maxillary sinus tenderness. Left sinus exhibits frontal sinus tenderness. Left sinus exhibits no maxillary sinus tenderness. Mouth/Throat: Oropharynx is clear and moist. No oropharyngeal exudate. Eyes: Pupils are equal, round, and reactive to light. Conjunctivae and EOM are normal. Right eye exhibits no discharge. Left eye exhibits no discharge. No scleral icterus. Neck: Normal range of motion. Neck supple. No JVD present. No tracheal deviation present. Cardiovascular: Normal rate, regular rhythm, normal heart sounds and intact distal pulses. Exam reveals no gallop and no friction rub. No murmur heard. Pulmonary/Chest: Effort normal and breath sounds normal. She has no wheezes. She has no rales. Abdominal: Soft. Bowel sounds are normal. She exhibits no mass. There is no tenderness. There is no rebound and no guarding. Musculoskeletal: Normal range of motion. She exhibits no edema or tenderness. Lymphadenopathy:     She has no cervical adenopathy. Neurological: She is alert and oriented to person, place, and time. She has normal reflexes. She displays normal reflexes. No cranial nerve deficit. She exhibits normal muscle tone. Coordination normal.   Skin: Skin is warm and dry. No rash noted. She is not diaphoretic. Psychiatric: She has a normal mood and affect. Her behavior is normal. Judgment and thought content normal.   Nursing note and vitals reviewed.         DIFFERENTIAL DIAGNOSIS:   Differential diagnoses were discussed extensively with the patient and family including but no limited to COPD exacerbation, pneumonia, bronchitis, sinusitis,     DIAGNOSTIC RESULTS     EKG: All EKG's are interpreted by the Emergency Department Physician who either signs or Co-signs this chart in the absence of a cardiologist.  EKG interpreted by Maurizio Whiteside DO:    EKG read and interpreted by myself with comparison to 05/05/2019 gives impression of normal sinus rhythm with heart rate of 65; interval 150; QRS 92;QTc 484; axis 36 -1 63. Normal sinus rhythm, Cannot rule out inferior infarct, age undetermined, No STEMI    RADIOLOGY: non-plain film images(s) such as CT, Ultrasound and MRI are read by the radiologist.    [address]   Final Result      No acute ischemic infarct, hemorrhage, or mass effect. **This report has been created using voice recognition software. It may contain minor errors which are inherent in voice recognition technology. **      Final report electronically signed by Dr. Kevan Duque on 5/14/2019 2:08 AM      XR CHEST STANDARD (2 VW)   Final Result   Stable radiographic appearance of the chest. No evidence of an acute process. **This report has been created using voice recognition software. It may contain minor errors which are inherent in voice recognition technology. **      Final report electronically signed by Dr. Alicja Ugarte on 5/13/2019 7:43 PM          LABS:   Labs Reviewed   CBC WITH AUTO DIFFERENTIAL - Abnormal; Notable for the following components:       Result Value    Hemoglobin 11.6 (*)     Hematocrit 36.9 (*)     MCV 76.4 (*)     MCH 24.0 (*)     MCHC 31.4 (*)     MPV 8.5 (*)     All other components within normal limits   COMPREHENSIVE METABOLIC PANEL - Abnormal; Notable for the following components:    Glucose 114 (*)     Chloride 93 (*)     Calcium 10.8 (*)     Total Bilirubin 0.2 (*)     All other components within normal limits   GLOMERULAR FILTRATION RATE, ESTIMATED - Abnormal; Notable for the following components:    Est, Glom Filt Rate 63 (*)     All other components within normal limits   OSMOLALITY - Abnormal; Notable for the following components:    Osmolality Calc 273.3 (*)     All other components within normal limits   BLOOD GAS, COPD exacerbation and urinary tract infection, and her cocaine abuse. At this point I called and spoke with Dr. Terrence Peres and discussed case with him. He graciously accepted the admission. Patient is admitted in stable condition pending further evaluation and treatment. CRITICALCARE:   None    CONSULTS:  Hospitalist    PROCEDURES:  None    FINAL IMPRESSION      1. COPD exacerbation ([facility]    2. Urinary tract infection without hematuria, site unspecified    3. Nondependent cocaine abuse ([facility]    4. Acute confusional state    5. Hypoxia          DISPOSITION/PLAN   Admission    PATIENT REFERRED TO:  No follow-up provider specified. DISCHARGE MEDICATIONS:  New Prescriptions    No medications on file       (Please note that portions of this note were completed with a voice recognition program.  Efforts weremade to edit the dictations but occasionally words are mis-transcribed.)    Scribe:  Niraj Renee 5/13/19 11:44 PM Scribing for and in the presence ofRobert Harper DO. Scribe: Niraj Renee 5/13/19 11:44 PM    Provider:  I personally performed the services described in the documentation, reviewed and edited the documentation which was dictated to the scribe inmy presence, and it accurately records my words and actions.     Nika Kothari DO 5/13/19 4:15 AM      Nika Kothari DO  05/14/19 [address], DO  05/14/19 [id number]

## 2019-05-14 NOTE — ED NOTES
Pt sleeping in room, no signs of distress, respirs easy and unlabored     Sera Acosta RN  05/14/19 [id number]

## 2019-05-14 NOTE — H&P
History & Physical      PCP: BA Groves NP    Date of Admission: 5/13/2019    Date of Service: 5/14/19    Chief Complaint:  Headache, throat and chest pain    History Of Present Illness:    History obtained from chart review and the patient. 66 y.o. female who presented to [address] [place] [address] with complaint of SOB and cough. She told the ED that these symptoms have been present for two weeks now, but was admitted last week for stroke like symptoms and didn't have any documented complaint similar at that time. Patient cites compliance with her breathing tx at home. When seen patient complains of left frontal headache, then throat pain, then chest pain. When prompted about SOB she cites her chest pain and cough.     Past Medical History:          Diagnosis Date    Allergic rhinitis     Arthritis     spine    Bipolar disorder ([address]     Blood circulation, collateral     Cancer ([address] 2011    cancerous polyps removed - Dr. Phoebe Anderson Colon polyps     COPD (chronic obstructive pulmonary disease) ([address] 7/24/2014    Depression     Diabetes ([address]     HgA1c 7.2 3/2017 - started Metformin and FSBS <150    Diverticulitis of colon     GERD (gastroesophageal reflux disease)     Hiatal hernia     History of colonoscopy 2002    History of kidney stones     Hx of blood clots 6/17/2014    PE and collar bone area after shoulder surgery    Hyperlipidemia     Hypertension     Liver disease     enlarged liver - damaged with alcohol in past but no cirrhosis per patient    Pneumonia 7/24/2014    Suicidal thoughts     2015 [facility]  from [facility]    Thyroid disease     Vomiting     Wears dentures     Wears glasses        Past Surgical History:          Procedure Laterality Date    ABDOMEN SURGERY      x4 removed cysts and ovary removed    CARDIAC SURGERY      heart caths, no stents    CARPAL TUNNEL RELEASE Bilateral 2016    CHOLECYSTECTOMY, LAPAROSCOPIC  6/12/15    Dr. Nereida Aldana II) 046341-1.1 UNIT/GM-% cream Apply topically 4 times daily. 2/21/19   Júnior He MD   fluticasone [facility]) 50 MCG/ACT nasal spray 1 spray by Each Nare route daily 2/11/19   Katherine Collins DO   hydrOXYzine (VISTARIL) 50 MG capsule Take 50 mg by mouth 3 times daily as needed for Itching or Anxiety     Historical Provider, MD   traZODone (DESYREL) 100 MG tablet Take 2 tablets by mouth nightly 10/31/18   Refugio Wells MD   QUEtiapine (SEROQUEL XR) 300 MG extended release tablet Take 2 tablets by mouth nightly  Patient taking differently: Take 600 mg by mouth nightly  10/31/18   Refugio Wells MD   levothyroxine (SYNTHROID) 150 MCG tablet Take 150 mcg by mouth once a week Weekly on Sunday    Historical Provider, MD   RaNITidine HCl (RANITIDINE 75 PO) Take 150 mg by mouth 2 times daily    Historical Provider, MD   Albuterol Sulfate (VENTOLIN HFA IN) Inhale 90 mg into the lungs 2 times daily    Historical Provider, MD   isosorbide dinitrate (ISORDIL) 10 MG tablet TAKE 1 TABLET BY MOUTH 3 TIMES DAILY 9/21/18   Beth Mckinney PA-C   carvedilol (COREG) 3.125 MG tablet TAKE 1 TABLET BY MOUTH 2 TIMES DAILY (WITH MEALS) 9/21/18   Matt Carey PA-C   hyoscyamine  MCG TBCR extended release tablet Take by mouth 2 times daily    Historical Provider, MD POOL PRESSAIR 400 MCG/ACT AEPB inhaler 1 puff 2 times daily  4/14/17   Historical Provider, MD   ferrous sulfate 325 (65 FE) MG tablet Take 325 mg by mouth daily (with breakfast)     Historical Provider, MD   levothyroxine (SYNTHROID) 75 MCG tablet Take 75 mcg by mouth Six times weekly everyday except Sunday take 150 mcg.     Historical Provider, MD   pantoprazole (PROTONIX) 40 MG tablet Take 1 tablet by mouth daily Indications: Gastroesophageal Reflux Disease 4/15/16   Ozzy Zazueta MD   albuterol (PROVENTIL HFA;VENTOLIN HFA) 108 (90 BASE) MCG/ACT inhaler Inhale 2 puffs into the lungs every 6 hours as needed for Wheezing     Historical Provider, MD       Allergies:  Seasonal    Social History:      The patient currently lives at home    TOBACCO:   reports that she has been smoking cigarettes. She started smoking about 42 years ago. She has a 15.00 pack-year smoking history. She has never used smokeless tobacco.  ETOH:   reports that she does not drink alcohol. Family History:      Reviewed in detail. Positive as follows:        Problem Relation Age of Onset    Cancer Mother     Heart Disease Mother     High Blood Pressure Mother     High Cholesterol Mother     Cancer Father         brain    Depression Father     Heart Disease Father     High Cholesterol Father     Mental Illness Father     Cancer Sister     Heart Attack Sister     Heart Disease Maternal Uncle     High Cholesterol Maternal Uncle     Diabetes Maternal Grandmother     Heart Disease Maternal Grandmother     High Cholesterol Maternal Grandmother     Early Death Maternal Grandfather     Heart Disease Maternal Grandfather     High Cholesterol Maternal Grandfather     Stroke Maternal Grandfather     Heart Disease Paternal Grandmother     High Cholesterol Paternal Grandmother     Heart Disease Paternal Grandfather     High Cholesterol Paternal Grandfather        REVIEW OF SYSTEMS:   14 point review of system performed, pertinent positives as noted in the HPI, all other systems negative/at baseline. PHYSICAL EXAM:    BP (!) 105/58   Pulse 57   Temp 97.6 °F (36.4 °C) (Oral)   Resp 18   Ht 5' 4\" (1.626 m)   Wt 160 lb (72.6 kg)   SpO2 92%   BMI 27.46 kg/m²       General appearance:  No apparent distress, appears older than stated age and cooperative. HEENT:  Normal cephalic, atraumatic without obvious deformity. Pupils equal, round, and reactive to light. Extra ocular muscles intact. Edentulous. Conjunctivae/corneas clear. Oropharynx without obvious lesions on direct observation  Neck: Supple, with full range of motion. No jugular venous distention. Trachea midline. Respiratory:  Normal respiratory effort. Clear to auscultation, bilaterally without Rales/Wheezes/Rhonchi. Cardiovascular:  Regular rate and rhythm with normal S1/S2 without murmurs, rubs or gallops. Abdomen: Soft, non-tender, non-distended with normal bowel sounds. Musculoskeletal:  No clubbing, cyanosis or edema bilaterally. Full range of motion without deformity. Skin: Skin color, texture, turgor normal.  No rashes or lesions. Neurologic:  Neurovascularly intact without any focal sensory/motor deficits. Cranial nerves: II-XII intact, grossly non-focal.  Psychiatric:  Alert and oriented, thought content appropriate, normal insight  Capillary Refill: Brisk,< 3 seconds   Peripheral Pulses: +2 palpable, equal bilaterally       Labs:     Recent Labs     05/13/19 1924   WBC 6.9   HGB 11.6*   HCT 36.9*        Recent Labs     05/13/19 1924      K 3.9   CL 93*   CO2 29   BUN 14   CREATININE 0.9   CALCIUM 10.8*     Recent Labs     05/13/19 1924   AST 27   ALT 26   BILITOT 0.2*   ALKPHOS 59     No results for input(s): INR in the last 72 hours. No results for input(s): Victorine Chihuahua in the last 72 hours. Urinalysis:      Lab Results   Component Value Date    NITRU NEGATIVE 05/14/2019    WBCUA > 200 05/14/2019    WBCUA 0-2 04/19/2012    BACTERIA MODERATE 05/14/2019    RBCUA 3-5 05/14/2019    BLOODU TRACE 05/14/2019    SPECGRAV 1.017 05/14/2019    GLUCOSEU NEGATIVE 03/11/2019       Radiology:   I have reviewed the imaging studies with the following interpretation:  CT HEAD WO CONTRAST   Final Result      No acute ischemic infarct, hemorrhage, or mass effect. **This report has been created using voice recognition software. It may contain minor errors which are inherent in voice recognition technology. **      Final report electronically signed by Dr. Anjel Robbins on 5/14/2019 2:08 AM      XR CHEST STANDARD (2 VW)   Final Result   Stable radiographic appearance of the chest. No evidence of an acute process. **This report has been created using voice recognition software. It may contain minor errors which are inherent in voice recognition technology. **      Final report electronically signed by Dr. Alicja Ugarte on 5/13/2019 7:43 PM          EKG:  I have reviewed the EKG with the following interpretation: Normal sinus rhythm  Cannot rule out Inferior infarct (cited on or before 23-APR-2018)  Abnormal ECG  When compared with ECG of 05-MAY-2019 17:57,  Serial changes of Inferior infarct Present  Confirmed by García Hopkins ([id number]) on 5/13/2019 8:38:35 PM    Diet:  No diet orders on file    DVT prophylaxis: [x] Lovenox                                 [] SCDs                                 [] SQ Heparin                                 [] Encourage ambulation           [] Already on Anticoagulation    Code Status: Prior      PT/OT Eval Status: no    Disposition:    [x] Home       [] TCU       [] Rehab       [] Psych       [] SNF       [] [place]       [] Other-       Assessment and Plan:    Principal Problem:    Hypoxia  Active Problems:    COPD (chronic obstructive pulmonary disease) ([facility]    Tobacco use disorder    Cocaine abuse ([facility]    COPD exacerbation ([facility]    Bipolar 1 disorder, depressed ([facility]    Polysubstance abuse ([facility]    Major depressive disorder, recurrent ([facility]  Resolved Problems:    * No resolved hospital problems. *        · Observation for SOB and hypoxia  · Wean O2 to RA, check ambulatory pulse ox  · Duonebs, prn albuterol nebs, daily prednisone 40mg x3 days  · Continue home medications  · Upon arrival to the floor she is not hypoxic on room air, main complaints of headache, throat pain, chest pain; will treat with fioricet, magic mouthwash and anticipate discharge today. Thank you BA Pérez - NP for the opportunity to be involved in this patient's care.     Electronically signed by Hayes Dan DO on 5/14/2019 at

## 2019-05-17 ENCOUNTER — HOSPITAL ENCOUNTER (INPATIENT)
Age: 64
LOS: 6 days | Discharge: HOME | DRG: 751 | End: 2019-05-23
Attending: EMERGENCY MEDICINE | Admitting: PSYCHIATRY & NEUROLOGY
Payer: COMMERCIAL

## 2019-05-17 DIAGNOSIS — R45.851 DEPRESSION WITH SUICIDAL IDEATION: Primary | ICD-10-CM

## 2019-05-17 DIAGNOSIS — F32.A DEPRESSION WITH SUICIDAL IDEATION: Primary | ICD-10-CM

## 2019-05-17 PROBLEM — F33.9 DEPRESSION, MAJOR, RECURRENT (HCC): Status: ACTIVE | Noted: 2019-05-17

## 2019-05-17 LAB
ACETAMINOPHEN LEVEL: < 5 UG/ML (ref 0–20)
ALBUMIN SERPL-MCNC: 4.5 G/DL (ref 3.5–5.1)
ALP BLD-CCNC: 59 U/L (ref 38–126)
ALT SERPL-CCNC: 26 U/L (ref 11–66)
AMPHETAMINE+METHAMPHETAMINE URINE SCREEN: NEGATIVE
ANION GAP SERPL CALCULATED.3IONS-SCNC: 18 MEQ/L (ref 8–16)
AST SERPL-CCNC: 20 U/L (ref 5–40)
BACTERIA: ABNORMAL /HPF
BARBITURATE QUANTITATIVE URINE: POSITIVE
BASOPHILS # BLD: 0.5 %
BASOPHILS ABSOLUTE: 0 THOU/MM3 (ref 0–0.1)
BENZODIAZEPINE QUANTITATIVE URINE: NEGATIVE
BILIRUB SERPL-MCNC: 0.3 MG/DL (ref 0.3–1.2)
BILIRUBIN URINE: NEGATIVE
BLOOD, URINE: NEGATIVE
BUN BLDV-MCNC: 16 MG/DL (ref 7–22)
CALCIUM SERPL-MCNC: 10.2 MG/DL (ref 8.5–10.5)
CANNABINOID QUANTITATIVE URINE: NEGATIVE
CASTS 2: ABNORMAL /LPF
CASTS UA: ABNORMAL /LPF
CHARACTER, URINE: CLEAR
CHLORIDE BLD-SCNC: 96 MEQ/L (ref 98–111)
CO2: 23 MEQ/L (ref 23–33)
COCAINE METABOLITE QUANTITATIVE URINE: POSITIVE
COLOR: YELLOW
CREAT SERPL-MCNC: 1.1 MG/DL (ref 0.4–1.2)
CRYSTALS, UA: ABNORMAL
EOSINOPHIL # BLD: 0.2 %
EOSINOPHILS ABSOLUTE: 0 THOU/MM3 (ref 0–0.4)
EPITHELIAL CELLS, UA: ABNORMAL /HPF
ERYTHROCYTE [DISTWIDTH] IN BLOOD BY AUTOMATED COUNT: 14.3 % (ref 11.5–14.5)
ERYTHROCYTE [DISTWIDTH] IN BLOOD BY AUTOMATED COUNT: 38.9 FL (ref 35–45)
ETHYL ALCOHOL, SERUM: < 0.01 %
GFR SERPL CREATININE-BSD FRML MDRD: 50 ML/MIN/1.73M2
GLUCOSE BLD-MCNC: 192 MG/DL (ref 70–108)
GLUCOSE URINE: NEGATIVE MG/DL
HCT VFR BLD CALC: 38.5 % (ref 37–47)
HEMOGLOBIN: 12.2 GM/DL (ref 12–16)
IMMATURE GRANS (ABS): 0.04 THOU/MM3 (ref 0–0.07)
IMMATURE GRANULOCYTES: 0.7 %
KETONES, URINE: NEGATIVE
LEUKOCYTE ESTERASE, URINE: ABNORMAL
LYMPHOCYTES # BLD: 21.5 %
LYMPHOCYTES ABSOLUTE: 1.2 THOU/MM3 (ref 1–4.8)
MCH RBC QN AUTO: 24.3 PG (ref 26–33)
MCHC RBC AUTO-ENTMCNC: 31.7 GM/DL (ref 32.2–35.5)
MCV RBC AUTO: 76.5 FL (ref 81–99)
MISCELLANEOUS 2: ABNORMAL
MONOCYTES # BLD: 1.2 %
MONOCYTES ABSOLUTE: 0.1 THOU/MM3 (ref 0.4–1.3)
NITRITE, URINE: NEGATIVE
NUCLEATED RED BLOOD CELLS: 0 /100 WBC
OPIATES, URINE: NEGATIVE
OSMOLALITY CALCULATION: 280.2 MOSMOL/KG (ref 275–300)
OXYCODONE: NEGATIVE
PH UA: 7.5 (ref 5–9)
PHENCYCLIDINE QUANTITATIVE URINE: NEGATIVE
PLATELET # BLD: 365 THOU/MM3 (ref 130–400)
PMV BLD AUTO: 8.4 FL (ref 9.4–12.4)
POTASSIUM SERPL-SCNC: 4.1 MEQ/L (ref 3.5–5.2)
PROTEIN UA: NEGATIVE
RBC # BLD: 5.03 MILL/MM3 (ref 4.2–5.4)
RBC URINE: ABNORMAL /HPF
RENAL EPITHELIAL, UA: ABNORMAL
SALICYLATE, SERUM: < 0.3 MG/DL (ref 2–10)
SEG NEUTROPHILS: 75.9 %
SEGMENTED NEUTROPHILS ABSOLUTE COUNT: 4.4 THOU/MM3 (ref 1.8–7.7)
SODIUM BLD-SCNC: 137 MEQ/L (ref 135–145)
SPECIFIC GRAVITY, URINE: 1.02 (ref 1–1.03)
TOTAL PROTEIN: 7.3 G/DL (ref 6.1–8)
TROPONIN T: < 0.01 NG/ML
TSH SERPL DL<=0.05 MIU/L-ACNC: 1.42 UIU/ML (ref 0.4–4.2)
UROBILINOGEN, URINE: 0.2 EU/DL (ref 0–1)
WBC # BLD: 5.8 THOU/MM3 (ref 4.8–10.8)
WBC UA: ABNORMAL /HPF
YEAST: ABNORMAL

## 2019-05-17 PROCEDURE — 84443 ASSAY THYROID STIM HORMONE: CPT

## 2019-05-17 PROCEDURE — 84484 ASSAY OF TROPONIN QUANT: CPT

## 2019-05-17 PROCEDURE — G0480 DRUG TEST DEF 1-7 CLASSES: HCPCS

## 2019-05-17 PROCEDURE — 1240000000 HC EMOTIONAL WELLNESS R&B

## 2019-05-17 PROCEDURE — 81001 URINALYSIS AUTO W/SCOPE: CPT

## 2019-05-17 PROCEDURE — 85025 COMPLETE CBC W/AUTO DIFF WBC: CPT

## 2019-05-17 PROCEDURE — 99285 EMERGENCY DEPT VISIT HI MDM: CPT

## 2019-05-17 PROCEDURE — 87086 URINE CULTURE/COLONY COUNT: CPT

## 2019-05-17 PROCEDURE — 80053 COMPREHEN METABOLIC PANEL: CPT

## 2019-05-17 PROCEDURE — 80307 DRUG TEST PRSMV CHEM ANLYZR: CPT

## 2019-05-17 PROCEDURE — 36415 COLL VENOUS BLD VENIPUNCTURE: CPT

## 2019-05-17 RX ORDER — TRAZODONE HYDROCHLORIDE 50 MG/1
50 TABLET ORAL NIGHTLY PRN
Status: DISCONTINUED | OUTPATIENT
Start: 2019-05-17 | End: 2019-05-23 | Stop reason: HOSPADM

## 2019-05-17 RX ORDER — HYDROXYZINE HYDROCHLORIDE 25 MG/1
25 TABLET, FILM COATED ORAL 3 TIMES DAILY PRN
Status: DISCONTINUED | OUTPATIENT
Start: 2019-05-17 | End: 2019-05-23 | Stop reason: HOSPADM

## 2019-05-17 RX ORDER — ACETAMINOPHEN 325 MG/1
650 TABLET ORAL EVERY 4 HOURS PRN
Status: DISCONTINUED | OUTPATIENT
Start: 2019-05-17 | End: 2019-05-18

## 2019-05-17 RX ORDER — HYDROXYZINE PAMOATE 25 MG/1
25 CAPSULE ORAL 3 TIMES DAILY PRN
Status: DISCONTINUED | OUTPATIENT
Start: 2019-05-17 | End: 2019-05-17 | Stop reason: CLARIF

## 2019-05-17 RX ORDER — ACETAMINOPHEN 500 MG
1000 TABLET ORAL ONCE
Status: COMPLETED | OUTPATIENT
Start: 2019-05-17 | End: 2019-05-18

## 2019-05-17 ASSESSMENT — PAIN SCALES - GENERAL
PAINLEVEL_OUTOF10: 10
PAINLEVEL_OUTOF10: 7

## 2019-05-17 ASSESSMENT — PAIN - FUNCTIONAL ASSESSMENT: PAIN_FUNCTIONAL_ASSESSMENT: ACTIVITIES ARE NOT PREVENTED

## 2019-05-17 ASSESSMENT — PAIN DESCRIPTION - ONSET
ONSET_2: ON-GOING
ONSET: ON-GOING

## 2019-05-17 ASSESSMENT — ENCOUNTER SYMPTOMS
SHORTNESS OF BREATH: 0
COUGH: 0
DIARRHEA: 0
VOMITING: 0
BACK PAIN: 0
RHINORRHEA: 0
EYE DISCHARGE: 0
SORE THROAT: 0
WHEEZING: 0
NAUSEA: 0
ABDOMINAL PAIN: 0
EYE PAIN: 0

## 2019-05-17 ASSESSMENT — PAIN DESCRIPTION - DESCRIPTORS
DESCRIPTORS: HEADACHE
DESCRIPTORS_2: ACHING

## 2019-05-17 ASSESSMENT — PAIN DESCRIPTION - PAIN TYPE
TYPE: ACUTE PAIN
TYPE: ACUTE PAIN

## 2019-05-17 ASSESSMENT — PAIN DESCRIPTION - PROGRESSION
CLINICAL_PROGRESSION_2: NOT CHANGED
CLINICAL_PROGRESSION: NOT CHANGED

## 2019-05-17 ASSESSMENT — SLEEP AND FATIGUE QUESTIONNAIRES
AVERAGE NUMBER OF SLEEP HOURS: 4
DIFFICULTY ARISING: NO
SLEEP PATTERN: DIFFICULTY FALLING ASLEEP;DISTURBED/INTERRUPTED SLEEP;RESTLESSNESS
DO YOU USE A SLEEP AID: YES
DO YOU USE A SLEEP AID: YES
DIFFICULTY FALLING ASLEEP: YES
RESTFUL SLEEP: NO
AVERAGE NUMBER OF SLEEP HOURS: 4
DIFFICULTY STAYING ASLEEP: YES
DO YOU HAVE DIFFICULTY SLEEPING: YES
DIFFICULTY FALLING ASLEEP: YES
DO YOU HAVE DIFFICULTY SLEEPING: YES
RESTFUL SLEEP: NO
DIFFICULTY ARISING: YES
DIFFICULTY STAYING ASLEEP: YES
SLEEP PATTERN: DIFFICULTY FALLING ASLEEP;DISTURBED/INTERRUPTED SLEEP;DIFFICULTY ARISING

## 2019-05-17 ASSESSMENT — LIFESTYLE VARIABLES: HISTORY_ALCOHOL_USE: NO

## 2019-05-17 ASSESSMENT — PATIENT HEALTH QUESTIONNAIRE - PHQ9: SUM OF ALL RESPONSES TO PHQ QUESTIONS 1-9: 15

## 2019-05-17 ASSESSMENT — PAIN DESCRIPTION - ORIENTATION
ORIENTATION_2: LOWER
ORIENTATION: MID

## 2019-05-17 ASSESSMENT — PAIN DESCRIPTION - FREQUENCY: FREQUENCY: CONTINUOUS

## 2019-05-17 ASSESSMENT — PAIN DESCRIPTION - LOCATION
LOCATION: HEAD
LOCATION_2: BACK
LOCATION: HEAD

## 2019-05-17 ASSESSMENT — PAIN DESCRIPTION - INTENSITY: RATING_2: 10

## 2019-05-17 ASSESSMENT — PAIN DESCRIPTION - DURATION: DURATION_2: CONTINUOUS

## 2019-05-18 PROCEDURE — 6370000000 HC RX 637 (ALT 250 FOR IP): Performed by: EMERGENCY MEDICINE

## 2019-05-18 PROCEDURE — 1240000000 HC EMOTIONAL WELLNESS R&B

## 2019-05-18 PROCEDURE — 6370000000 HC RX 637 (ALT 250 FOR IP): Performed by: PSYCHIATRY & NEUROLOGY

## 2019-05-18 PROCEDURE — 94640 AIRWAY INHALATION TREATMENT: CPT

## 2019-05-18 PROCEDURE — 99222 1ST HOSP IP/OBS MODERATE 55: CPT | Performed by: PSYCHIATRY & NEUROLOGY

## 2019-05-18 RX ORDER — LEVOTHYROXINE SODIUM 0.07 MG/1
75 TABLET ORAL
Status: DISCONTINUED | OUTPATIENT
Start: 2019-05-18 | End: 2019-05-23 | Stop reason: HOSPADM

## 2019-05-18 RX ORDER — CLOPIDOGREL BISULFATE 75 MG/1
75 TABLET ORAL DAILY
Status: DISCONTINUED | OUTPATIENT
Start: 2019-05-18 | End: 2019-05-23 | Stop reason: HOSPADM

## 2019-05-18 RX ORDER — ISOSORBIDE DINITRATE 10 MG/1
10 TABLET ORAL 3 TIMES DAILY
Status: DISCONTINUED | OUTPATIENT
Start: 2019-05-18 | End: 2019-05-23 | Stop reason: HOSPADM

## 2019-05-18 RX ORDER — HYDROCHLOROTHIAZIDE 25 MG/1
25 TABLET ORAL 2 TIMES DAILY
Status: DISCONTINUED | OUTPATIENT
Start: 2019-05-18 | End: 2019-05-23 | Stop reason: HOSPADM

## 2019-05-18 RX ORDER — MIRTAZAPINE 15 MG/1
15 TABLET, FILM COATED ORAL NIGHTLY
Status: DISCONTINUED | OUTPATIENT
Start: 2019-05-18 | End: 2019-05-20

## 2019-05-18 RX ORDER — LANOLIN ALCOHOL/MO/W.PET/CERES
3000 CREAM (GRAM) TOPICAL DAILY
Status: DISCONTINUED | OUTPATIENT
Start: 2019-05-18 | End: 2019-05-23 | Stop reason: HOSPADM

## 2019-05-18 RX ORDER — ASPIRIN 81 MG/1
81 TABLET, CHEWABLE ORAL DAILY
Status: DISCONTINUED | OUTPATIENT
Start: 2019-05-18 | End: 2019-05-23 | Stop reason: HOSPADM

## 2019-05-18 RX ORDER — ALOSETRON HYDROCHLORIDE 0.5 MG/1
0.5 TABLET, FILM COATED ORAL 2 TIMES DAILY
Status: DISCONTINUED | OUTPATIENT
Start: 2019-05-18 | End: 2019-05-23 | Stop reason: HOSPADM

## 2019-05-18 RX ORDER — PANTOPRAZOLE SODIUM 40 MG/1
40 TABLET, DELAYED RELEASE ORAL DAILY
Status: DISCONTINUED | OUTPATIENT
Start: 2019-05-18 | End: 2019-05-23 | Stop reason: HOSPADM

## 2019-05-18 RX ORDER — QUETIAPINE 300 MG/1
600 TABLET, FILM COATED, EXTENDED RELEASE ORAL NIGHTLY
Status: DISCONTINUED | OUTPATIENT
Start: 2019-05-18 | End: 2019-05-23 | Stop reason: HOSPADM

## 2019-05-18 RX ORDER — ALBUTEROL SULFATE 90 UG/1
2 AEROSOL, METERED RESPIRATORY (INHALATION) EVERY 6 HOURS PRN
Status: DISCONTINUED | OUTPATIENT
Start: 2019-05-18 | End: 2019-05-23 | Stop reason: HOSPADM

## 2019-05-18 RX ORDER — ARIPIPRAZOLE 2 MG/1
2 TABLET ORAL DAILY
Status: DISCONTINUED | OUTPATIENT
Start: 2019-05-18 | End: 2019-05-22

## 2019-05-18 RX ORDER — LUBIPROSTONE 24 UG/1
24 CAPSULE, GELATIN COATED ORAL 2 TIMES DAILY WITH MEALS
Status: DISCONTINUED | OUTPATIENT
Start: 2019-05-18 | End: 2019-05-23 | Stop reason: HOSPADM

## 2019-05-18 RX ORDER — LEVOTHYROXINE SODIUM 0.15 MG/1
150 TABLET ORAL WEEKLY
Status: DISCONTINUED | OUTPATIENT
Start: 2019-05-19 | End: 2019-05-23 | Stop reason: HOSPADM

## 2019-05-18 RX ORDER — FOLIC ACID 1 MG/1
1 TABLET ORAL DAILY
Status: DISCONTINUED | OUTPATIENT
Start: 2019-05-18 | End: 2019-05-23 | Stop reason: HOSPADM

## 2019-05-18 RX ORDER — CELECOXIB 200 MG/1
200 CAPSULE ORAL DAILY
Status: DISCONTINUED | OUTPATIENT
Start: 2019-05-18 | End: 2019-05-23 | Stop reason: HOSPADM

## 2019-05-18 RX ORDER — NICOTINE 21 MG/24HR
1 PATCH, TRANSDERMAL 24 HOURS TRANSDERMAL DAILY
Status: DISCONTINUED | OUTPATIENT
Start: 2019-05-18 | End: 2019-05-23 | Stop reason: HOSPADM

## 2019-05-18 RX ORDER — HYOSCYAMINE SULFATE EXTENDED-RELEASE 0.38 MG/1
375 TABLET ORAL 2 TIMES DAILY
Status: DISCONTINUED | OUTPATIENT
Start: 2019-05-18 | End: 2019-05-23 | Stop reason: HOSPADM

## 2019-05-18 RX ORDER — BENZONATATE 100 MG/1
100 CAPSULE ORAL 3 TIMES DAILY
Status: DISCONTINUED | OUTPATIENT
Start: 2019-05-18 | End: 2019-05-23 | Stop reason: HOSPADM

## 2019-05-18 RX ORDER — CARVEDILOL 3.12 MG/1
3.12 TABLET ORAL 2 TIMES DAILY WITH MEALS
Status: DISCONTINUED | OUTPATIENT
Start: 2019-05-18 | End: 2019-05-23 | Stop reason: HOSPADM

## 2019-05-18 RX ORDER — TRAMADOL HYDROCHLORIDE 50 MG/1
50 TABLET ORAL EVERY 6 HOURS PRN
Status: DISCONTINUED | OUTPATIENT
Start: 2019-05-18 | End: 2019-05-23 | Stop reason: HOSPADM

## 2019-05-18 RX ADMIN — TRAMADOL HYDROCHLORIDE 50 MG: 50 TABLET, FILM COATED ORAL at 18:17

## 2019-05-18 RX ADMIN — HYDROXYZINE HYDROCHLORIDE 25 MG: 25 TABLET ORAL at 13:22

## 2019-05-18 RX ADMIN — ISOSORBIDE DINITRATE 10 MG: 10 TABLET ORAL at 21:33

## 2019-05-18 RX ADMIN — FOLIC ACID 1 MG: 1 TABLET ORAL at 09:05

## 2019-05-18 RX ADMIN — TRAZODONE HYDROCHLORIDE 50 MG: 50 TABLET ORAL at 00:28

## 2019-05-18 RX ADMIN — HYOSCYAMINE SULFATE 375 MCG: 0.38 TABLET, EXTENDED RELEASE ORAL at 09:05

## 2019-05-18 RX ADMIN — PANCRELIPASE LIPASE, PANCRELIPASE PROTEASE, PANCRELIPASE AMYLASE 1 CAPSULE: 10000; 32000; 42000 CAPSULE, DELAYED RELEASE ORAL at 13:14

## 2019-05-18 RX ADMIN — ACETAMINOPHEN 1000 MG: 500 TABLET, FILM COATED ORAL at 00:28

## 2019-05-18 RX ADMIN — HYDROCHLOROTHIAZIDE 25 MG: 25 TABLET ORAL at 09:05

## 2019-05-18 RX ADMIN — HYOSCYAMINE SULFATE 375 MCG: 0.38 TABLET, EXTENDED RELEASE ORAL at 21:33

## 2019-05-18 RX ADMIN — ACETAMINOPHEN 650 MG: 325 TABLET ORAL at 06:40

## 2019-05-18 RX ADMIN — QUETIAPINE FUMARATE 600 MG: 300 TABLET, EXTENDED RELEASE ORAL at 21:33

## 2019-05-18 RX ADMIN — BENZONATATE 100 MG: 100 CAPSULE ORAL at 09:05

## 2019-05-18 RX ADMIN — PANCRELIPASE LIPASE, PANCRELIPASE PROTEASE, PANCRELIPASE AMYLASE 1 CAPSULE: 10000; 32000; 42000 CAPSULE, DELAYED RELEASE ORAL at 09:06

## 2019-05-18 RX ADMIN — CARVEDILOL 3.12 MG: 3.12 TABLET, FILM COATED ORAL at 18:11

## 2019-05-18 RX ADMIN — TRAZODONE HYDROCHLORIDE 50 MG: 50 TABLET ORAL at 21:33

## 2019-05-18 RX ADMIN — HYDROXYZINE HYDROCHLORIDE 25 MG: 25 TABLET ORAL at 21:33

## 2019-05-18 RX ADMIN — PANTOPRAZOLE SODIUM 40 MG: 40 TABLET, DELAYED RELEASE ORAL at 09:05

## 2019-05-18 RX ADMIN — CLOPIDOGREL BISULFATE 75 MG: 75 TABLET ORAL at 09:05

## 2019-05-18 RX ADMIN — BENZONATATE 100 MG: 100 CAPSULE ORAL at 21:33

## 2019-05-18 RX ADMIN — HYDROXYZINE HYDROCHLORIDE 25 MG: 25 TABLET ORAL at 00:28

## 2019-05-18 RX ADMIN — CELECOXIB 200 MG: 200 CAPSULE ORAL at 09:05

## 2019-05-18 RX ADMIN — ACETAMINOPHEN 650 MG: 325 TABLET ORAL at 13:22

## 2019-05-18 RX ADMIN — PANCRELIPASE LIPASE, PANCRELIPASE PROTEASE, PANCRELIPASE AMYLASE 1 CAPSULE: 10000; 32000; 42000 CAPSULE, DELAYED RELEASE ORAL at 18:17

## 2019-05-18 RX ADMIN — Medication 3000 MCG: at 09:06

## 2019-05-18 RX ADMIN — Medication 2 PUFF: at 09:43

## 2019-05-18 RX ADMIN — LEVOTHYROXINE SODIUM 75 MCG: 75 TABLET ORAL at 06:35

## 2019-05-18 RX ADMIN — HYDROCHLOROTHIAZIDE 25 MG: 25 TABLET ORAL at 21:33

## 2019-05-18 RX ADMIN — CARVEDILOL 3.12 MG: 3.12 TABLET, FILM COATED ORAL at 09:05

## 2019-05-18 RX ADMIN — ARIPIPRAZOLE 2 MG: 2 TABLET ORAL at 09:05

## 2019-05-18 RX ADMIN — ASPIRIN 81 MG 81 MG: 81 TABLET ORAL at 09:05

## 2019-05-18 RX ADMIN — MIRTAZAPINE 15 MG: 15 TABLET, FILM COATED ORAL at 21:33

## 2019-05-18 RX ADMIN — ISOSORBIDE DINITRATE 10 MG: 10 TABLET ORAL at 09:05

## 2019-05-18 RX ADMIN — BENZONATATE 100 MG: 100 CAPSULE ORAL at 13:15

## 2019-05-18 RX ADMIN — ISOSORBIDE DINITRATE 10 MG: 10 TABLET ORAL at 13:15

## 2019-05-18 ASSESSMENT — PAIN DESCRIPTION - INTENSITY: RATING_2: 9

## 2019-05-18 ASSESSMENT — PAIN DESCRIPTION - DESCRIPTORS
DESCRIPTORS_2: ACHING
DESCRIPTORS: ACHING
DESCRIPTORS: ACHING

## 2019-05-18 ASSESSMENT — PATIENT HEALTH QUESTIONNAIRE - PHQ9: SUM OF ALL RESPONSES TO PHQ QUESTIONS 1-9: 15

## 2019-05-18 ASSESSMENT — LIFESTYLE VARIABLES: HISTORY_ALCOHOL_USE: NO

## 2019-05-18 ASSESSMENT — PAIN DESCRIPTION - ORIENTATION: ORIENTATION_2: LOWER

## 2019-05-18 ASSESSMENT — PAIN SCALES - GENERAL
PAINLEVEL_OUTOF10: 0
PAINLEVEL_OUTOF10: 8
PAINLEVEL_OUTOF10: 9
PAINLEVEL_OUTOF10: 6
PAINLEVEL_OUTOF10: 9
PAINLEVEL_OUTOF10: 10
PAINLEVEL_OUTOF10: 8

## 2019-05-18 ASSESSMENT — SLEEP AND FATIGUE QUESTIONNAIRES
AVERAGE NUMBER OF SLEEP HOURS: 4
DO YOU HAVE DIFFICULTY SLEEPING: YES
RESTFUL SLEEP: NO
DIFFICULTY STAYING ASLEEP: YES
DO YOU USE A SLEEP AID: YES
SLEEP PATTERN: DIFFICULTY FALLING ASLEEP;DISTURBED/INTERRUPTED SLEEP;DIFFICULTY ARISING
DIFFICULTY FALLING ASLEEP: YES
DIFFICULTY ARISING: YES

## 2019-05-18 ASSESSMENT — PAIN DESCRIPTION - LOCATION
LOCATION: GENERALIZED
LOCATION_2: BACK
LOCATION: ELBOW

## 2019-05-18 ASSESSMENT — PAIN DESCRIPTION - FREQUENCY
FREQUENCY: CONTINUOUS
FREQUENCY: CONTINUOUS

## 2019-05-18 ASSESSMENT — PAIN DESCRIPTION - PROGRESSION
CLINICAL_PROGRESSION: NOT CHANGED
CLINICAL_PROGRESSION_2: NOT CHANGED

## 2019-05-18 ASSESSMENT — PAIN DESCRIPTION - ONSET
ONSET: ON-GOING
ONSET_2: ON-GOING

## 2019-05-18 ASSESSMENT — PAIN DESCRIPTION - PAIN TYPE
TYPE: ACUTE PAIN
TYPE: CHRONIC PAIN

## 2019-05-18 ASSESSMENT — PAIN DESCRIPTION - DURATION: DURATION_2: CONTINUOUS

## 2019-05-18 NOTE — PROGRESS NOTES
5 [facility]  Initial Interdisciplinary Treatment Plan NOTE    Review Date & Time: 5/18/19 1356    Patient was in treatment team    Admission Type:   Admission Type: Voluntary    Reason for admission:  Reason for Admission: Suicidal thoughts      Estimated Length of Stay Update:  3-5 day  Estimated Discharge Date Update: 5/22/19    PATIENT STRENGTHS:  Patient Strengths Strengths: Communication  Patient Strengths and Limitations:Limitations: Difficult relationships / poor social skills  Addictive Behavior:Addictive Behavior  In the past 3 months, have you felt or has someone told you that you have a problem with:  : None  Do you have a history of Chemical Use?: No  Do you have a history of Alcohol Use?: No  Do you have a history of Street Drug Abuse?: Yes  Histroy of Prescripton Drug Abuse?: No  Medical Problems:  Past Medical History:   Diagnosis Date    Allergic rhinitis     Arthritis     spine    B[address] disorder ([address]     Blood circulation, collateral     Cancer ([address] 2011    cancerous polyps removed - Dr. Mireya Gant Colon polyps     COPD (chronic obstructive pulmonary disease) ([address] 7/24/2014    Depression     Diverticulitis of colon     GERD (gastroesophageal reflux disease)     Hiatal hernia     History of colonoscopy 2002    History of kidney stones     Hx of blood clots 6/17/2014    PE and collar bone area after shoulder surgery    Hyperlipidemia     Hypertension     Liver disease     enlarged liver - damaged with alcohol in past but no cirrhosis per patient    Pneumonia 7/24/2014    Suicidal thoughts     [facility]    Thyroid disease     Vomiting     Wears dentures     Wears glasses        EDUCATION:   Learner Progress Toward Treatment Goals: Reviewed results and recommendations of this team, Reviewed group plan and strategies, Reviewed signs, symptoms and risk of self harm and violent behavior and Reviewed goals and plan of care    Method: Small group    Outcome: Verbalized understanding and Demonstrated Understanding    PATIENT GOALS: \"Get support\"    PLAN/TREATMENT RECOMMENDATIONS UPDATE: medication management, supportive therapy, discharge planning    GOALS UPDATE: 3 day  Time frame for Short-Term Goals: daily/ongoing  1. What is the most important thing that you need help with while you are here? \"get support\"  2. Who is your support system? \"limited, some family\"  3. Do you have follow-up providers? Yes, Demond  4. Do you have the ability to pay for your medications? Yes, insurance  5. Where will you be residing when you leave the hospital? Plans to return home  6. What is a phone # we may contact you at?  See patient chart        Rose Marie Hinton [facility]

## 2019-05-18 NOTE — H&P
Department of Psychiatry  Attending History and Physical - Adult         CHIEF COMPLAINT:  Suicidal ideation     History obtained from:  patient    HISTORY OF PRESENT ILLNESS:          The patient is a 66 y.o. female with significant past medical history of depression who presents with worsening depression and suicidal ideation with a plan to cut her wrist. She had argument with her son who wanted her to go assisted living facility. PSYCHIATRIC HISTORY:      The patient is currently receiving care for the above psychiatric illness.     Past mental health outpatient care includes:  1-5 treatment centers    Past mental health hospitalizations: 1-5 admissions    Lifetime Psychiatric Review of Systems         Radha or Hypomania:  yes     Panic Attacks:  yes -      Phobias:  no     Obsessions and Compulsions:  no     Body or Vocal Tics:  no     Hallucinations:  yes     Delusions:  no    Past psychiatric medications include:  seroquel    Adverse reactions from psychotropic medications:  none    Past Medical History:        Diagnosis Date    Allergic rhinitis     Arthritis     spine    [facility]     Blood circulation, collateral     Cancer ([facility] 2011    cancerous polyps removed - Dr. Mireya Gant Colon polyps     COPD (chronic obstructive pulmonary disease) ([facility] 7/24/2014    Depression     Diverticulitis of colon     GERD (gastroesophageal reflux disease)     Hiatal hernia     History of colonoscopy 2002    History of kidney stones     Hx of blood clots 6/17/2014    PE and collar bone area after shoulder surgery    Hyperlipidemia     Hypertension     Liver disease     enlarged liver - damaged with alcohol in past but no cirrhosis per patient    Pneumonia 7/24/2014    Suicidal thoughts     [facility]    Thyroid disease     Vomiting     Wears dentures     Wears glasses      Past Surgical History:        Procedure Laterality Date    ABDOMEN SURGERY      x4 removed cysts and ovary removed    CARDIAC SURGERY      heart caths, no stents    CARPAL TUNNEL RELEASE Bilateral 2016    CHOLECYSTECTOMY, LAPAROSCOPIC  6/12/15    Dr. Isa Rajan, ESOPHAGUS      ELBOW SURGERY Right 10/7/2004    Dr. Enriquez January Bilateral 2016    decompression   Adrianna Stern [facility]    ROTATOR CUFF REPAIR  2004, 2014    right shoulder    SHOULDER SURGERY  2014    right    SKIN BIOPSY  2006    under left eye     Medications Prior to Admission:   Medications Prior to Admission: Cyanocobalamin (VITAMIN B-12) 3000 MCG SUBL, Place 1 tablet under the tongue Daily  folic acid (FOLVITE) 1 MG tablet, Take 1 tablet by mouth daily  aspirin 81 MG chewable tablet, Take 1 tablet by mouth daily  hydrochlorothiazide (HYDRODIURIL) 25 MG tablet, TAKE 1 TABLET BY MOUTH TWICE A DAY  nabumetone (RELAFEN) 500 MG tablet, Take 1 tablet by mouth 2 times daily  mirtazapine (REMERON) 15 MG tablet, Take 1 tablet by mouth nightly  ARIPiprazole (ABILIFY) 2 MG tablet, Take 1 tablet by mouth daily  alosetron (LOTRONEX) 0.5 MG tablet, Take 0.5 mg by mouth 2 times daily  lubiprostone (AMITIZA) 24 MCG capsule, Take 24 mcg by mouth 2 times daily (with meals)  benzonatate (TESSALON PERLES) 100 MG capsule, Take 100 mg by mouth 3 times daily  nystatin (MYCOSTATIN) POWD powder, Apply 1 each topically 2 times daily  clopidogrel (PLAVIX) 75 MG tablet, TAKE 1 TABLET BY MOUTH DAILY  hydrOXYzine (VISTARIL) 50 MG capsule, Take 50 mg by mouth 3 times daily as needed for Itching or Anxiety   traZODone (DESYREL) 100 MG tablet, Take 2 tablets by mouth nightly  QUEtiapine (SEROQUEL XR) 300 MG extended release tablet, Take 2 tablets by mouth nightly (Patient taking differently: Take 600 mg by mouth nightly )  levothyroxine (SYNTHROID) 150 MCG tablet, Take 150 mcg by mouth once a week Weekly on Sunday  Albuterol Sulfate (VENTOLIN HFA IN), Inhale 90 mg into the lungs 2 times daily as needed (2 puffs)   isosorbide dinitrate (ISORDIL) 10 MG tablet, TAKE 1 TABLET BY MOUTH 3 TIMES DAILY  carvedilol (COREG) 3.125 MG tablet, TAKE 1 TABLET BY MOUTH 2 TIMES DAILY (WITH MEALS)  hyoscyamine  MCG TBCR extended release tablet, Take by mouth 2 times daily  TUDORZA PRESSAIR 400 MCG/ACT AEPB inhaler, 1 puff 2 times daily as needed (wheezing/SOB)   levothyroxine (SYNTHROID) 75 MCG tablet, Take 75 mcg by mouth Six times weekly everyday except  take 150 mcg. pantoprazole (PROTONIX) 40 MG tablet, Take 1 tablet by mouth daily Indications: Gastroesophageal Reflux Disease  albuterol (PROVENTIL HFA;VENTOLIN HFA) 108 (90 BASE) MCG/ACT inhaler, Inhale 2 puffs into the lungs every 6 hours as needed for Wheezing   [] predniSONE (DELTASONE) 20 MG tablet, Take 2 tablets by mouth daily for 2 days  lipase-protease-amylase (ZENPEP) 5000 units delayed release capsule, Take 2 capsules by mouth 3 times daily (with meals) And 1 capsule with snacks  nystatin-triamcinolone (MYCOLOG II) 013856-6.1 UNIT/GM-% cream, Apply topically 4 times daily.   Allergies:  Seasonal      Family History:       Problem Relation Age of Onset    Cancer Mother     Heart Disease Mother     High Blood Pressure Mother     High Cholesterol Mother     Cancer Father         brain    Depression Father     Heart Disease Father     High Cholesterol Father     Mental Illness Father     Cancer Sister     Heart Attack Sister     Heart Disease Maternal Uncle     High Cholesterol Maternal Uncle     Diabetes Maternal Grandmother     Heart Disease Maternal Grandmother     High Cholesterol Maternal Grandmother     Early Death Maternal Grandfather     Heart Disease Maternal Grandfather     High Cholesterol Maternal Grandfather     Stroke Maternal Grandfather     Heart Disease Paternal Grandmother     High Cholesterol Paternal Grandmother     Heart Disease Paternal Grandfather     High Cholesterol Paternal Grandfather      Psychiatric Family History  No family history    REVIEW OF SYSTEMS:  CONSTITUTIONAL:  negative  EYES:  negative  HEENT:  negative  RESPIRATORY:  negative  CARDIOVASCULAR:  negative  GASTROINTESTINAL:  negative  GENITOURINARY:  negative  INTEGUMENT/BREAST:  negative  HEMATOLOGIC/LYMPHATIC:  negative  ALLERGIC/IMMUNOLOGIC:  negative  ENDOCRINE:  negative    PHYSICAL EXAM:    Vitals:  BP (!) 108/58   Pulse 68   Temp 97.2 °F (36.2 °C) (Oral)   Resp 18   Ht 5' 4\" (1.626 m)   Wt 157 lb (71.2 kg)   SpO2 95%   BMI 26.95 kg/m²     Mental Status Examination:  Level of consciousness:  Mild dysattention (reduced clarity of awareness with impaired ability to focus, sustain, or shift attention)  Appearance:  ill-appearing  Behavior/Motor:  psychomotor retardation  Attitude toward examiner:  cooperative, attentive and poor eye contact  Speech:  slow  Mood:  depressed  Affect:  mood congruent  Thought processes:  slow  Thought content:  Homocidal ideation denies  Suicidal Ideation:  active  Delusions:  no evidence of delusions  Perceptual Disturbance:  denies any perceptual disturbance  Cognition:  oriented to person, place, and time  Concentration succeeded  Memory intact  Insight:  poor  Judgment:  poor          DSM-IV DIAGNOSIS:    Impression    (Axis I):        Bipolar II disorder; depressed episode    ASSESSMENT AND PLAN:    Admit to psych unit  Labs and EKG  Resume and adjust medications accordingly  Milieu therapy

## 2019-05-18 NOTE — PROGRESS NOTES
partial compliance with taking prescribed psychotropic medication. Pt denies alcohol use, report a 40 year history of crack cocaine use. Pt reportedly use crack cocaine \"every once in a great while\", last time was yesterday. Pt denies violence, denies access to guns, report a legal history of three felony convictions (drug trafficking) and was in [facility] 8584-7301. Pt is oriented x4, good insight, impaired judgment, good eye contact, cooperative, linear thought. Pt endorse feelings of depression. Report loss of sleep, normal appetite. Level of Care Disposition:        Pt medically cleared by Dr. Sharmaine Burleson.    200  Consult with Dr. Saloni Dietrich who authorized admission to . (Pt and Dr. Sharmaine Burleson informed)    [phone]  Pt reviewed and signed the Voluntary Admission Form. 7262  Report called to . Pt to be admitted to [facility].   (ED Charge Nurse [facility] informed)

## 2019-05-18 NOTE — GROUP NOTE
Group Therapy Note    Date: May 18    Group Start Time: 0930  Group End Time: 0945  Group Topic: Community Meeting    [facility] Adult Psych 4E    Merna Raymond         PLAN OF CARE:     Start Time: 0915  End Time:  0945    Group Topic:  Daily Goals    Group Type:   Goal Group    Intervention/Goal:  To increase support and identify daily goals    Attendance: pt was in attendance for goal group and community. Affect: pt displayed a congruent affect    Behavior: pt was cooperative and pleasant during group. Pt was attentive and interactive. Pt had active participation during session. Response: pt responded appropriately and identified a daily goal.    Daily Goal: \"Figure things out for after discharge. \"    Progress: pt progress is ongoing.

## 2019-05-18 NOTE — ED PROVIDER NOTES
[facility]  eMERGENCY dEPARTMENT eNCOUnter          CHIEF COMPLAINT       Chief Complaint   Patient presents with    Suicidal       Nurses Notes reviewed and I agreeexcept as noted in the HPI. HISTORY OF PRESENT ILLNESS    Anita Robledo is a 66 y.o. female who presents to Emergency Department with complaints or suicidal ideation and suicidal plan. Patient states that she got into an argument with her son who visited her yesterday and threatened to become her power of  so that he could put her in an assisted living facility. Patient also reports that she got into an argument with her neighbor and reports that nobody cares about her. Patient states that she had suicidal ideation and headache. She states that she has suicidal plan to cut her wrist with a razor blade and she has cut her wrists many times in the past 10 years. Patient has history of suicidal ideation and currently is a client of [facility]. She was diagnosed with schizoaffective disorder and bipolar disorder. She states that she is compliant with her medication but has not taken them recently because she did not go to refill them. Patient is also complaining of a headache which radiates to her left ear. She also reports a \"cold\" chest pain. Patient denies any alcohol use today. She reports 40 year history of cocaine use. Patient states that the last time she used was yesterday. All questions addressed and no further complaints or concerns at this time. HPI provided by the patient. REVIEW OF SYSTEMS     Review of Systems   Constitutional: Negative for appetite change, chills, fatigue and fever. HENT: Negative for congestion, ear pain, rhinorrhea and sore throat. Eyes: Negative for pain, discharge and visual disturbance. Respiratory: Negative for cough, shortness of breath and wheezing. Cardiovascular: Positive for chest pain. Negative for palpitations and leg swelling.    Gastrointestinal: Negative for abdominal pain, diarrhea, nausea and vomiting. Genitourinary: Negative for difficulty urinating, dysuria, hematuria and vaginal discharge. Musculoskeletal: Negative for arthralgias, back pain, joint swelling and neck pain. Skin: Negative for pallor and rash. Neurological: Positive for headaches. Negative for dizziness, syncope, weakness, light-headedness and numbness. Hematological: Negative for adenopathy. Psychiatric/Behavioral: Positive for dysphoric mood, self-injury and suicidal ideas. Negative for confusion. The patient is not nervous/anxious. PAST MEDICAL HISTORY    has a past medical history of Allergic rhinitis, Arthritis, Bipolar disorder ([facility] Blood circulation, collateral, Cancer ([facility] Colon polyps, COPD (chronic obstructive pulmonary disease) (HCC), Depression, Diverticulitis of colon, GERD (gastroesophageal reflux disease), Hiatal hernia, History of colonoscopy, History of kidney stones, Hx of blood clots, Hyperlipidemia, Hypertension, Liver disease, Pneumonia, Suicidal thoughts, Thyroid disease, Vomiting, Wears dentures, and Wears glasses. SURGICAL HISTORY      has a past surgical history that includes Mandible fracture surgery (1984); shoulder surgery (2014); Colonoscopy (2011); Dilatation, esophagus; Elbow surgery (Right, 10/7/2004); Rotator cuff repair (2004, 2014); skin biopsy (2006); Cholecystectomy, laparoscopic (6/12/15); Elbow surgery (Bilateral, 2016); Carpal tunnel release (Bilateral, 2016); Hysterectomy (1998); Cardiac surgery; and Abdomen surgery.     CURRENT MEDICATIONS       Current Discharge Medication List      CONTINUE these medications which have NOT CHANGED    Details   Cyanocobalamin (VITAMIN B-12) 3000 MCG SUBL Place 1 tablet under the tongue Daily  Qty: 30 tablet, Refills: 2      folic acid (FOLVITE) 1 MG tablet Take 1 tablet by mouth daily  Qty: 30 tablet, Refills: 3      aspirin 81 MG chewable tablet Take 1 tablet by mouth daily  Qty: 30 tablet, Refills: 0      !! levothyroxine (SYNTHROID) 75 MCG tablet Take 75 mcg by mouth Six times weekly everyday except  take 150 mcg. pantoprazole (PROTONIX) 40 MG tablet Take 1 tablet by mouth daily Indications: Gastroesophageal Reflux Disease  Qty: 10 tablet, Refills: 0      !! albuterol (PROVENTIL HFA;VENTOLIN HFA) 108 (90 BASE) MCG/ACT inhaler Inhale 2 puffs into the lungs every 6 hours as needed for Wheezing       lipase-protease-amylase (ZENPEP) 5000 units delayed release capsule Take 2 capsules by mouth 3 times daily (with meals) And 1 capsule with snacks      nystatin-triamcinolone (MYCOLOG II) 886678-8.1 UNIT/GM-% cream Apply topically 4 times daily. Qty: 2 Tube, Refills: 1       !! - Potential duplicate medications found. Please discuss with provider. ALLERGIES     is allergic to seasonal.    FAMILY HISTORY     indicated that her mother is . She indicated that her father is . She indicated that her sister is alive. She indicated that her brother is alive. She indicated that the status of her maternal grandmother is unknown. She indicated that the status of her maternal grandfather is unknown. She indicated that the status of her paternal grandmother is unknown. She indicated that the status of her paternal grandfather is unknown. She indicated that the status of her maternal uncle is unknown.   family history includes Cancer in her father, mother, and sister; Depression in her father; Diabetes in her maternal grandmother; Early Death in her maternal grandfather; Heart Attack in her sister;  Heart Disease in her father, maternal grandfather, maternal grandmother, maternal uncle, mother, paternal grandfather, and paternal grandmother; High Blood Pressure in her mother; High Cholesterol in her father, maternal grandfather, maternal grandmother, maternal uncle, mother, paternal grandfather, and paternal grandmother; Mental Illness in her father; Stroke in her maternal grandfather. SOCIAL HISTORY      reports that she has been smoking cigarettes. She started smoking about 42 years ago. She has a 15.00 pack-year smoking history. She has never used smokeless tobacco. She reports that she has current or past drug history. Drug: Cocaine. Frequency: 1.00 time per week. She reports that she does not drink alcohol. PHYSICAL EXAM     INITIAL VITALS:  height is 5' 4\" (1.626 m) and weight is 157 lb (71.2 kg). Her oral temperature is 98.6 °F (37 °C). Her blood pressure is 108/64 and her pulse is 67. Her respiration is 16 and oxygen saturation is 95%. Physical Exam   Constitutional: She is oriented to person, place, and time. She appears well-developed and well-nourished. Non-toxic appearance. HENT:   Head: Normocephalic and atraumatic. Right Ear: Tympanic membrane and external ear normal.   Left Ear: Tympanic membrane and external ear normal.   Nose: Nose normal.   Mouth/Throat: Oropharynx is clear and moist and mucous membranes are normal. No oropharyngeal exudate, posterior oropharyngeal edema or posterior oropharyngeal erythema. Eyes: Conjunctivae and EOM are normal.   Neck: Normal range of motion. Neck supple. No JVD present. Cardiovascular: Normal rate, regular rhythm, normal heart sounds, intact distal pulses and normal pulses. Exam reveals no gallop and no friction rub. No murmur heard. Pulmonary/Chest: Effort normal and breath sounds normal. No respiratory distress. She has no decreased breath sounds. She has no wheezes. She has no rhonchi. She has no rales. Abdominal: Soft. Bowel sounds are normal. She exhibits no distension. There is no tenderness. There is no rebound, no guarding and no CVA tenderness. Musculoskeletal: Normal range of motion. She exhibits no edema. Neurological: She is alert and oriented to person, place, and time. She exhibits normal muscle tone. Coordination normal.   Skin: Skin is warm and dry. No rash noted. She is not diaphoretic. 0.3 0.3 - 1.2 mg/dL    ALT 26 11 - 66 U/L   Troponin   Result Value Ref Range    Troponin T < 0.010 ng/ml   Acetaminophen level   Result Value Ref Range    Acetaminophen Level < 5.0 0.0 - 91.7 ug/mL   Salicylate Level   Result Value Ref Range    Salicylate, Serum < 0.3 (L) 2.0 - 10.0 mg/dL   Ethanol   Result Value Ref Range    ETHYL ALCOHOL, SERUM < 0.01 0.00 %   TSH with Reflex   Result Value Ref Range    TSH 1.420 0.400 - 4.20 uIU/mL   Urine Drug Screen   Result Value Ref Range    AMPHETAMINE+METHAMPHETAMINE URINE SCREEN Negative NEGATIVE    Barbiturate Quant, Ur POSITIVE NEGATIVE    Benzodiazepine Quant, Ur Negative NEGATIVE    Cannabinoid Quant, Ur Negative NEGATIVE    Cocaine Metab Quant, Ur POSITIVE NEGATIVE    Opiates, Urine Negative NEGATIVE    Oxycodone Negative NEGATIVE    PCP Quant, Ur Negative NEGATIVE   Urine with Reflexed Micro   Result Value Ref Range    Glucose, Ur NEGATIVE NEGATIVE mg/dl    Bilirubin Urine NEGATIVE NEGATIVE    Ketones, Urine NEGATIVE NEGATIVE    Specific Gravity, Urine 1.017 1.002 - 1.03    Blood, Urine NEGATIVE NEGATIVE    pH, UA 7.5 5.0 - 9.0    Protein, UA NEGATIVE NEGATIVE    Urobilinogen, Urine 0.2 0.0 - 1.0 eu/dl    Nitrite, Urine NEGATIVE NEGATIVE    Leukocyte Esterase, Urine MODERATE (A) NEGATIVE    Color, UA YELLOW STRAW-YELL    Character, Urine CLEAR CLEAR-SL C    RBC, UA 0-2 0-2/hpf /hpf    WBC, UA 5-10 0-4/hpf /hpf    Epi Cells 3-5 3-5/hpf /hpf    Bacteria, UA NONE FEW/NONE S /hpf    Casts UA NONE SEEN NONE SEEN /lpf    Crystals NONE SEEN NONE SEEN    Renal Epithelial, Urine NONE SEEN NONE SEEN    Yeast, UA NONE SEEN NONE SEEN    CASTS 2 NONE SEEN NONE SEEN /lpf    MISCELLANEOUS 2 NONE SEEN    Anion Gap   Result Value Ref Range    Anion Gap 18.0 (H) 8.0 - 16.0 meq/L   Glomerular Filtration Rate, Estimated   Result Value Ref Range    Est, Glom Filt Rate 50 (A) ml/min/1.73m2   Osmolality   Result Value Ref Range    Osmolality Calc 280.2 275.0 - 300 mOsmol/kg EMERGENCY DEPARTMENT COURSE:   Vitals:    Vitals:    05/17/19 2101 05/17/19 2310 05/17/19 2333   BP: (!) 116/53 108/64    Pulse: 82 67    Resp: 20 16    Temp: 98.5 °F (36.9 °C) 98.6 °F (37 °C)    TempSrc: Oral Oral    SpO2: 98% 95%    Weight: 157 lb (71.2 kg)  157 lb (71.2 kg)   Height: 5' 4\" (1.626 m)  5' 4\" (1.626 m)       21:05    Patient is seen and evaluated in a timely fashion. MedicalDecision Making    Medically cleared after labs are reviewed. BAC sees him and discusses with on call psychiatrist, admitted to . CRITICAL CARE:   None    CONSULTS:  BAC and on call psychiatrist    PROCEDURES:  None    FINAL IMPRESSION      1. Depression with suicidal ideation          DISPOSITION/PLAN   Admit    PATIENT REFERRED TO:  No follow-up provider specified. DISCHARGE MEDICATIONS:  Current Discharge Medication List          (Please note that portions of this note were completed with a voice recognition program.  Efforts were made to edit the dictations but occasionally words are mis-transcribed.)    The patient was given an opportunity to see the Emergency Attending. The patient voiced understanding that I was a Mid-Level Provider and was in agreement with being seen independently by myself. Scribe:  Daniel Mata 5/17/19 10:05 PM Scribing for and in the presence of Frankie Albarado MD.    Provider:  I personally performed the services described in the documentation, reviewed and edited the documentation which was dictated to the scribe in my presence, and it accurately records my words and actions.     Frankie Albarado MD 5/17/19 3:11 AM      MD Frankie Hannah MD  05/18/19 0953

## 2019-05-18 NOTE — BH NOTE
INPATIENT RECREATIONAL THERAPY  ADULT BEHAVIORAL SERVICES  EVALUATION    REFERRING PHYSICIAN:  Dr. Dorothy Nieves  DIAGNOSIS:   Depression, major, recurrent  PRECAUTIONS:   Suicide precautions    HISTORY OF PRESENT ILLNESS/INJURY: pt was admitted to  for suicidal ideations and a suicidal plan. Pt reported a recent fight with her son where he threatened to become POA so he could put her in an assisted living facility. Pt reports an argument with her neighbor as well as nobody caring about her. Pt stated that she had a plan to cut her wrists with a razor, and has cut her wrists many times in the past 10 years. PMH:  Please see medical chart for prior medical history, allergies, and medications    HISTORY OF PSYCHIATRIC TREATMENT:  Pt has been admitted to  in the past, with the last admission being 3/12/2019. Pt receives OP services at [facility]. YOB: 1955  GENDER:  Female    MARITAL STATUS:   with 3 children  EMPLOYMENT STATUS:  unemployed  LIVING SITUATION/SUPPORT:  lives with sister  EDUCATIONAL LEVEL:  graduate  MEDICATION/DRUG USE: pt stated she is compliant with medications, however she has not been on them recently because she did not refill them. Pt has a 40 year hx of cocaine abuse-- last use being yesterday, hx alcohol abuse, and hx overdose. LEISURE INTERESTS:  crocheting, playing cards/ games, cooking, walking, watching TV/ Movies, word searches, pet care, computer activities, arts and crafts, activities with friends, listening to music. ACTIVITY PREFERENCE: no preference  ACTIVITY TYPES:  passive, active, indoor, outdoor  COGNITION: A&Ox4    COPING: poor  ATTENTION:  fair  RELAXATION: reports anxiety  SELF-ESTEEM: poor  MOTIVATION:  fair    SOCIAL SKILLS:  good  FRUSTRATION TOLERANCE:  Hx of cutting. Not hx of violence  ATTENTION SEEKING: hx cutting.   COOPERATION: cooperative and pleasant  AFFECT: flat  APPEARANCE: pt displayed fair grooming and hygiene and was dressed in blue scrub attire    HEARING:  No impairments noted  VISION:  Corrective lenses- glasses  VERBAL COMMUNICATION:  No impairments noted  WRITTEN COMMUNICATION:  No impairments noted    COORDINATION: no impairments noted  MOBILITY: pt ambulates independently  GOALS: pt to increase socialization and knowledge of coping skills through participation of group therapy sessions following encouragement from staff.

## 2019-05-18 NOTE — BH NOTE
Behavioral Health   Admission Note     Admission Type:   Admission Type: Voluntary    Reason for admission:  Reason for Admission: Suicidal thoughts    PATIENT STRENGTHS:  Strengths: Communication    Patient Strengths and Limitations:  Limitations: Difficult relationships / poor social skills    Addictive Behavior:   Addictive Behavior  In the past 3 months, have you felt or has someone told you that you have a problem with:  : None  Do you have a history of Chemical Use?: No  Do you have a history of Alcohol Use?: No  Do you have a history of Street Drug Abuse?: Yes  Histroy of Prescripton Drug Abuse?: No    Medical Problems:   Past Medical History:   Diagnosis Date    Allergic rhinitis     Arthritis     spine    B[facility] disorder ([facility]     Blood circulation, collateral     [facility] 2011    cancerous polyps removed - Dr. Martha Montalvo Colon polyps     COPD (chronic obstructive pulmonary disease) ([facility] 7/24/2014    Depression     Diverticulitis of colon     GERD (gastroesophageal reflux disease)     Hiatal hernia     History of colonoscopy 2002    History of kidney stones     Hx of blood clots 6/17/2014    PE and collar bone area after shoulder surgery    Hyperlipidemia     Hypertension     Liver disease     enlarged liver - damaged with alcohol in past but no cirrhosis per patient    Pneumonia 7/24/2014    Suicidal thoughts     2015 admitted to  from [facility]    Thyroid disease     Vomiting     Wears dentures     Wears glasses        Status EXAM:  Status and Exam  Normal: No  Facial Expression: Worried  Affect: Blunt  Level of Consciousness: Alert  Mood:Normal: No  Mood: Depressed, Anxious  Motor Activity:Normal: No  Motor Activity: Decreased  Interview Behavior: Cooperative  Preception: [place] to Person, [place] to Time, [place] to Place, [place] to Situation  Attention:Normal: No  Attention: Distractible  Thought Processes: Circumstantial  Thought Content:Normal: No  Thought Content: Paranoia  Hallucinations: Auditory (Comment)  Delusions: No  Memory:Normal: No  Memory: Poor Recent, Poor Remote  Insight and Judgment: No  Insight and Judgment: Poor Judgment, Poor Insight  Present Suicidal Ideation: No  Present Homicidal Ideation: No    Pt admitted with followings belongings:  Dentures: Lowers, Uppers  Vision - Corrective Lenses: Glasses  Hearing Aid: None  Jewelry: None  Body Piercings Removed: N/A  Clothing: Footwear, Pants, Shirt, Socks, Undergarments (Comment)  Were All Patient Medications Collected?: Yes  Other Valuables: Cell phone, [employer]     Admission order obtained yes. Valuables sent home with no. Valuables placed in safe in security envelope, number:  [id number]. Patient's home medications were locked in the narcotic cupboard. Patient oriented to surroundings and program expectations and copy of patient rights given. Received admission packet:  yes. Consents reviewed, signed yes. \"An Important Message from [employer] About Your Rights\" form reviewed, signed na. Refused no. Patient verbalize understanding:  yes. Patient education on precautions: yes           Patient screened positive for suicide risk on CSSR-S (\"yes\" to question #4, 5, OR 6)  Yes. Physician notified of risk score. Orders received N . Provided pt with inContact Online handout entitled \"Quitting Smoking. \"  Reviewed handout with pt addressing dangers of smoking, developing coping skills, and providing basic information about quitting. Pt response to counseling:  Yes. Patient admitted from the ER per wheelchair. Patient alert and oriented times 4. Patient was cooperative with the admission assessment. Patient states suicidal thoughts for the past 2 days to cut herself. Patient states hearing a voice calling her name and feels there is someone in her home. Patient states feeling anxious and depressed for years but worse recently.  Patient currently denies feeling suicidal or homicidal. Patient states anxiety, poor sleep and feeling generally weak at times. Patient states she feels upset that her son is trying to force her into assisted living.    Hernandez Hampton RN

## 2019-05-18 NOTE — PLAN OF CARE
Problem: Altered Mood, Depressive Behavior:  Goal: Ability to disclose and discuss suicidal ideas will improve  Description  Ability to disclose and discuss suicidal ideas will improve  Outcome: Met This Shift  Note:   Patient denies suicidal ideations, no plan or intent to harm self. Patient remains on suicidal precautions 15 checks for safety. Instructed to seek staff as needed for thoughts of self harm. Goal: Able to verbalize support systems  Description  Able to verbalize support systems  Outcome: Met This Shift  Note:   Patient reports son as their support system. Goal: Absence of self-harm  Description  Absence of self-harm  Outcome: Met This Shift  Note:   No self harm behaviors were observed or reported so far this shift. Remains on every 15 minutes precautions for safety. Goal: Participates in care planning  Description  Participates in care planning  Outcome: Met This Shift  Note:   This patient participates in care planning      Problem: Suicide risk  Goal: Provide patient with safe environment  Description  Provide patient with safe environment  Outcome: Met This Shift  Note:   Maintained in safe and secure environment. Patient did not fill out safety plan this shift. Problem: Falls - Risk of:  Goal: Will remain free from falls  Description  Will remain free from falls  Outcome: Met This Shift  Note:   No falls were observed or reported so far this shift, gait steady when ambulating and wears non-skid slippers socks. Encourage patient to wear shower shoes while in the shower. Remains on fall precautions. Goal: Absence of physical injury  Description  Absence of physical injury  Outcome: Met This Shift  Note:   No falls were observed or reported so far this shift, gait steady when ambulating and wears non-skid slippers socks. Encourage patient to wear shower shoes while in the shower. Remains on fall precautions.       Problem: KNOWLEDGE DEFICIT,EDUCATION,DISCHARGE PLAN  Goal: Knowledge - personal safety  Outcome: Ongoing  Note:   Maintained in safe and secure environment. Patient did not fill out safety plan this shift. Problem: Altered Mood, Depressive Behavior:  Goal: Able to verbalize acceptance of life and situations over which he or she has no control  Description  Able to verbalize acceptance of life and situations over which he or she has no control  Outcome: Ongoing  Note:   Patient verbalizes little acceptance of situations over which she has no control. Encouraged patient to attend group therapy. Goal: Able to verbalize and/or display a decrease in depressive symptoms  Description  Able to verbalize and/or display a decrease in depressive symptoms  Outcome: Ongoing  Note:   Patient reports mood less than normal. Has flat affect. Speech clear. good eye contact. Reports hope for future and identifies son as their support system. Goal: Patient specific goal  Description  Patient specific goal  Outcome: Ongoing  Note:   Figure things out after discharge - ongoing     Problem: Pain:  Goal: Pain level will decrease  Description  Pain level will decrease  Outcome: Ongoing  Note:   Patient reports elbow and back pain 8 out of 10. Patient taking tylenol for pain. Patient reports same pain after taking pain medications. Goal: Control of acute pain  Description  Control of acute pain  Outcome: Ongoing  Note:   Patient reports elbow and back pain 8 out of 10. Patient taking tylenol for pain. Patient reports same pain after taking pain medications. Goal: Control of chronic pain  Description  Control of chronic pain  Outcome: Ongoing  Note:   Patient reports elbow and back pain 8 out of 10. Patient taking tylenol for pain. Patient reports same pain after taking pain medications. Problem:  Activity:  Goal: Sleeping patterns will improve  Description  Sleeping patterns will improve  Outcome: Ongoing  Note:   Pt slept for 5.5 hours last night     Problem: Anxiety:  Goal: Level of anxiety will decrease  Description  Level of anxiety will decrease  Outcome: Ongoing  Note:   Patient reports high anxiety. Pt taking vistaril prn. Verbalized medication was somewhat effective. Problem: Coping - Ineffective, Individual:  Goal: Ability to identify and develop effective coping behavior will improve  Description  Ability to identify and develop effective coping behavior will improve  Outcome: Ongoing  Note:   Pt has poor coping     Problem: Coping:  Goal: Ability to identify problematic behaviors that deter socialization will improve  Description  Ability to identify problematic behaviors that deter socialization will improve  5/18/2019 1511 by Tripp Pressley RN  Outcome: Ongoing  Note:   Pt continues to work on socialization problems  5/18/2019 1455 by Jessica To  Outcome: Ongoing     Problem: Role Relationship:  Goal: Ability to demonstrate positive changes in social behaviors and relationships will improve  Description  Ability to demonstrate positive changes in social behaviors and relationships will improve  5/18/2019 1511 by Tripp Pressley RN  Outcome: Ongoing  Note:   Pt continues to work on behavior problems  5/18/2019 1455 by Jessica To  Outcome: Ongoing     Problem: Discharge Planning:  Goal: Discharged to appropriate level of care  Description  Discharged to appropriate level of care  Outcome: Not Met This Shift  Note:   Discharge planners working with patient to achieve optimal discharge plan, specific to the needs of this patient. Care plan reviewed with patient.   Patient does verbalize understanding of the plan of care and does contribute to goal setting

## 2019-05-18 NOTE — PROGRESS NOTES
[facility] Biopsychosocial Assessment    Current Level of Psychosocial Functioning     Independent XX  Dependent    Minimal Assist     Comments:      Psychosocial High Risk Factors (check all that apply)    Unable to obtain meds   Chronic illness/pain    Substance abuse XX  Lack of Family Support   Financial stress   Isolation   Inadequate Community Resources  Suicide attempt(s) XX  Not taking medications XX   Victim of crime   Developmental Delay  Unable to manage personal needs    Age 72 or older   Homeless  No transportation   Readmission within 30 days  Unemployment   Traumatic Event    Comments:   Sexual Orientation:  Did not say    Patient Strengths: communication    Patient Barriers: potentially harmful leisure activities, somewhat limited support    Plan of Care     medication management, group/individual therapies, family meetings, psycho -education, treatment team meetings to assist with stabilization    Initial Discharge Plan:  Patient plans to return home. Follow up with Edith Stafford when patient is established client      Clinical Summary:  Patient is a 66year old  female who presents voluntarily experiencing suicidal ideation and thoughts of cutting. Patient reports a history of cutting behaviors and suicide attempts. Patient endorses a 40 year addiction to crack cocaine with last use being 5/17/19. Patient reports a legal history of 3 felonies and long term from [id number]. Patient lives in her own home with a friend.     Maria Elena Palencia, LPC

## 2019-05-18 NOTE — PLAN OF CARE
Pt has attended one group that has been offered on the unit this shift. Pt has been out on the unit today, socializing with peers. Pt will be encouraged to attend the remainder of the groups that are offered on the unit today. Pt will be encouraged to continue interaction with peers while out on the unit during the day. Pt progress for daily socialization goal is ongoing.

## 2019-05-19 LAB
ORGANISM: ABNORMAL
URINE CULTURE REFLEX: ABNORMAL

## 2019-05-19 PROCEDURE — 6370000000 HC RX 637 (ALT 250 FOR IP): Performed by: PSYCHIATRY & NEUROLOGY

## 2019-05-19 PROCEDURE — 1240000000 HC EMOTIONAL WELLNESS R&B

## 2019-05-19 PROCEDURE — 99231 SBSQ HOSP IP/OBS SF/LOW 25: CPT | Performed by: NURSE PRACTITIONER

## 2019-05-19 RX ADMIN — CELECOXIB 200 MG: 200 CAPSULE ORAL at 08:11

## 2019-05-19 RX ADMIN — HYDROXYZINE HYDROCHLORIDE 25 MG: 25 TABLET ORAL at 08:12

## 2019-05-19 RX ADMIN — HYOSCYAMINE SULFATE 375 MCG: 0.38 TABLET, EXTENDED RELEASE ORAL at 21:26

## 2019-05-19 RX ADMIN — Medication 3000 MCG: at 08:11

## 2019-05-19 RX ADMIN — BENZONATATE 100 MG: 100 CAPSULE ORAL at 21:27

## 2019-05-19 RX ADMIN — MIRTAZAPINE 15 MG: 15 TABLET, FILM COATED ORAL at 21:26

## 2019-05-19 RX ADMIN — BENZONATATE 100 MG: 100 CAPSULE ORAL at 08:11

## 2019-05-19 RX ADMIN — TRAMADOL HYDROCHLORIDE 50 MG: 50 TABLET, FILM COATED ORAL at 21:26

## 2019-05-19 RX ADMIN — TRAZODONE HYDROCHLORIDE 50 MG: 50 TABLET ORAL at 21:26

## 2019-05-19 RX ADMIN — PANCRELIPASE LIPASE, PANCRELIPASE PROTEASE, PANCRELIPASE AMYLASE 1 CAPSULE: 10000; 32000; 42000 CAPSULE, DELAYED RELEASE ORAL at 13:22

## 2019-05-19 RX ADMIN — HYOSCYAMINE SULFATE 375 MCG: 0.38 TABLET, EXTENDED RELEASE ORAL at 08:11

## 2019-05-19 RX ADMIN — ARIPIPRAZOLE 2 MG: 2 TABLET ORAL at 08:11

## 2019-05-19 RX ADMIN — ISOSORBIDE DINITRATE 10 MG: 10 TABLET ORAL at 13:22

## 2019-05-19 RX ADMIN — BENZONATATE 100 MG: 100 CAPSULE ORAL at 13:22

## 2019-05-19 RX ADMIN — TRAMADOL HYDROCHLORIDE 50 MG: 50 TABLET, FILM COATED ORAL at 08:11

## 2019-05-19 RX ADMIN — QUETIAPINE FUMARATE 600 MG: 300 TABLET, EXTENDED RELEASE ORAL at 21:26

## 2019-05-19 RX ADMIN — PANCRELIPASE LIPASE, PANCRELIPASE PROTEASE, PANCRELIPASE AMYLASE 1 CAPSULE: 10000; 32000; 42000 CAPSULE, DELAYED RELEASE ORAL at 08:11

## 2019-05-19 RX ADMIN — CARVEDILOL 3.12 MG: 3.12 TABLET, FILM COATED ORAL at 17:21

## 2019-05-19 RX ADMIN — FOLIC ACID 1 MG: 1 TABLET ORAL at 08:11

## 2019-05-19 RX ADMIN — TRAMADOL HYDROCHLORIDE 50 MG: 50 TABLET, FILM COATED ORAL at 14:37

## 2019-05-19 RX ADMIN — HYDROXYZINE HYDROCHLORIDE 25 MG: 25 TABLET ORAL at 21:26

## 2019-05-19 RX ADMIN — ISOSORBIDE DINITRATE 10 MG: 10 TABLET ORAL at 08:11

## 2019-05-19 RX ADMIN — HYDROCHLOROTHIAZIDE 25 MG: 25 TABLET ORAL at 08:11

## 2019-05-19 RX ADMIN — ISOSORBIDE DINITRATE 10 MG: 10 TABLET ORAL at 21:26

## 2019-05-19 RX ADMIN — LEVOTHYROXINE SODIUM 150 MCG: 150 TABLET ORAL at 06:17

## 2019-05-19 RX ADMIN — HYDROCHLOROTHIAZIDE 25 MG: 25 TABLET ORAL at 21:26

## 2019-05-19 RX ADMIN — CARVEDILOL 3.12 MG: 3.12 TABLET, FILM COATED ORAL at 08:11

## 2019-05-19 RX ADMIN — ASPIRIN 81 MG 81 MG: 81 TABLET ORAL at 08:12

## 2019-05-19 RX ADMIN — CLOPIDOGREL BISULFATE 75 MG: 75 TABLET ORAL at 08:12

## 2019-05-19 RX ADMIN — PANTOPRAZOLE SODIUM 40 MG: 40 TABLET, DELAYED RELEASE ORAL at 08:11

## 2019-05-19 RX ADMIN — PANCRELIPASE LIPASE, PANCRELIPASE PROTEASE, PANCRELIPASE AMYLASE 1 CAPSULE: 10000; 32000; 42000 CAPSULE, DELAYED RELEASE ORAL at 17:21

## 2019-05-19 ASSESSMENT — PAIN DESCRIPTION - FREQUENCY: FREQUENCY: CONTINUOUS

## 2019-05-19 ASSESSMENT — PAIN DESCRIPTION - PROGRESSION
CLINICAL_PROGRESSION_2: NOT CHANGED
CLINICAL_PROGRESSION: NOT CHANGED

## 2019-05-19 ASSESSMENT — PAIN DESCRIPTION - LOCATION
LOCATION: GENERALIZED
LOCATION: GENERALIZED

## 2019-05-19 ASSESSMENT — PAIN SCALES - GENERAL
PAINLEVEL_OUTOF10: 6
PAINLEVEL_OUTOF10: 7
PAINLEVEL_OUTOF10: 6
PAINLEVEL_OUTOF10: 0
PAINLEVEL_OUTOF10: 8
PAINLEVEL_OUTOF10: 8

## 2019-05-19 ASSESSMENT — PAIN DESCRIPTION - DESCRIPTORS
DESCRIPTORS: ACHING
DESCRIPTORS_2: ACHING

## 2019-05-19 ASSESSMENT — PAIN DESCRIPTION - PAIN TYPE
TYPE: CHRONIC PAIN
TYPE: CHRONIC PAIN

## 2019-05-19 ASSESSMENT — PAIN DESCRIPTION - DURATION: DURATION_2: CONTINUOUS

## 2019-05-19 ASSESSMENT — PAIN DESCRIPTION - ONSET
ONSET: ON-GOING
ONSET_2: ON-GOING

## 2019-05-19 ASSESSMENT — PAIN - FUNCTIONAL ASSESSMENT: PAIN_FUNCTIONAL_ASSESSMENT: ACTIVITIES ARE NOT PREVENTED

## 2019-05-19 NOTE — PROGRESS NOTES
Psychiatry Progress Note        5-    CC: Bipolar II D/O Depressed                     HPI:  The patient is a 66 y.o. female with significant past medical history of depression who presents with worsening depression and suicidal ideation with a plan to cut her wrist. She had argument with her son who wanted her to go assisted living facility.     Progress:  José Miguel Harrell reports feeling about the same. Reports still has depression. Denies feelings of self harm. Meds were restarted yesterday. Reports had been off of them for 2 days. Reports appetite is good and slept well last night. Verified slept 8.5 hours continuous. Denies going to groups. Denies getting any visits. States she really doesn't have any support systems. Reports does have casemanager at [facility]. MSE:  Level of consciousness: Alert  Appearance: hospital attire, in chair and fair grooming   Behavior/Motor: no abnormalities noted   Attitude toward examiner: cooperative   Speech: Normal volume, goal directed, NRR  Mood: Euthymic  Affect: Reactive  Thought processes: Linear and goal directed   Suicidal Ideation: Denies suicidal ideations  Homicidal ideation: Denies homicidal ideations  Delusions: No evidence of delusions is observed  Perceptual Disturbance: Denies AH/VH;  No evidence of psychosis is observed. Cognition: Oriented to person, place, time and situation   Concentration fair   Memory intact   Insight: Poor  Judgment: Poor    Assessment:  Bipolar II D/O Depressed    Plan:  Continue current meds as ordered  Continue to encourage group attendance.       Connee Siemens, KESHAV  4-

## 2019-05-19 NOTE — BH NOTE
Pt did not attend [address] [place] [address] and Goal group therapy session. Pt provided maximum verbal encouragement to attend, pt remained resting in bed during time of group therapy session. No daily goal was obtained.

## 2019-05-19 NOTE — PLAN OF CARE
Problem: Social interaction  Goal: Increased social interaction  Outcome: Not Met This Shift  Intervention: Encourage increased socialization through group therapy  Note:   Pt has not attended any group therapy sessions offered thus far today. Pt has been resting in her room the majority of the day but has not been seen out on the unit engaging in socialization with peers. Pt will continue to be encouraged to attend group therapy sessions and participate appropriately. Pt will continue to be encouraged to socialize with peers throughout the day.  Pt will continue to progress toward social interaction goal.

## 2019-05-19 NOTE — PLAN OF CARE
Problem: Altered Mood, Depressive Behavior:  Goal: Ability to disclose and discuss suicidal ideas will improve  Description  Ability to disclose and discuss suicidal ideas will improve  5/19/2019 1053 by Jennifer Rankin RN  Outcome: Met This Shift  Note:   Patient denies suicidal ideations, no plan or intent to harm self. Patient remains on suicidal precautions 15 checks for safety. Instructed to seek staff as needed for thoughts of self harm. 5/19/2019 0059 by Zhang Dugan RN  Outcome: Ongoing  Note:   Patient denies any suicidal thoughts so far this shift. Goal: Absence of self-harm  Description  Absence of self-harm  5/19/2019 1053 by Jennifer Rankin RN  Outcome: Met This Shift  Note:   No self harm behaviors were observed or reported so far this shift. Remains on every 15 minutes precautions for safety. 5/19/2019 0059 by Zhang Dugan RN  Outcome: Ongoing  Note:   No self harm noted so far this shift. Goal: Participates in care planning  Description  Participates in care planning  5/19/2019 1053 by Jennifer Rankin RN  Outcome: Met This Shift  Note:   This patient participates in care planning   5/19/2019 0059 by Zhang Dugan RN  Outcome: Ongoing  Note:   Care plan reviewed with patient and fair understanding verbalized. Problem: Suicide risk  Goal: Provide patient with safe environment  Description  Provide patient with safe environment  5/19/2019 1053 by Jennifer Rankin RN  Outcome: Met This Shift  Note:   Maintained in safe and secure environment. Patient did not fill out safety plan this shift. 5/19/2019 0059 by Zhang Dugan RN  Outcome: Ongoing  Note:   Patient maintained in a safe environment. 15 minute visual checks continued.      Problem: Falls - Risk of:  Goal: Will remain free from falls  Description  Will remain free from falls  5/19/2019 1053 by Jennifer Rankin RN  Outcome: Met This Shift  Note:   No falls were observed or reported so far this shift, gait steady when ambulating and wears non-skid slippers socks. Encourage patient to wear shower shoes while in the shower. Remains on fall precautions. 5/19/2019 0059 by Rica Stewart RN  Outcome: Ongoing  Note:   No falls noted so far this shift. Goal: Absence of physical injury  Description  Absence of physical injury  5/19/2019 1053 by Rachid Delgado RN  Outcome: Met This Shift  Note:   Free from injury at this time  5/19/2019 0059 by Rica Stewart RN  Outcome: Ongoing  Note:   None noted so far this shift. Problem: Activity:  Goal: Sleeping patterns will improve  Description  Sleeping patterns will improve  5/19/2019 1053 by Rachid Delgado RN  Outcome: Met This Shift  Note:   Patient slept 8.5 hours last night, reports they slept well. Encourage patient not to take naps during the day, relax several hours before bed and to take prescribed sleep meds as ordered. Patient verbalized understanding. 5/19/2019 0059 by Rica Stewart RN  Outcome: Ongoing  Note:   Patient states she feels her sleep pattern is starting to improve. Problem: KNOWLEDGE DEFICIT,EDUCATION,DISCHARGE PLAN  Goal: Knowledge - personal safety  5/19/2019 1053 by Rachid Delgado RN  Outcome: Ongoing  Note:   Maintained in safe and secure environment. Patient did not fill out safety plan this shift. 5/19/2019 0059 by Rica Stewart RN  Outcome: Ongoing  Note:   Patient verbalizes fair knowledge of current safety measures. Problem: Altered Mood, Depressive Behavior:  Goal: Able to verbalize acceptance of life and situations over which he or she has no control  Description  Able to verbalize acceptance of life and situations over which he or she has no control  5/19/2019 1053 by Rachid Delgado RN  Outcome: Ongoing  Note:   Patient verbalizes little acceptance of situations over which she has no control. Encouraged patient to attend group therapy.     5/19/2019 0059 by Riac Stewart RN  Outcome: Ongoing  Note:   Patient verbalizes fair acceptance of situations over which she has no control. Goal: Able to verbalize and/or display a decrease in depressive symptoms  Description  Able to verbalize and/or display a decrease in depressive symptoms  5/19/2019 1053 by Braden Delgado RN  Outcome: Ongoing  Note:   Pt states that she is depressed, she is isolative and does not attend groups  5/19/2019 0059 by Trupti Estrada RN  Outcome: Ongoing  Note:   Patient noted with a blunt affect and brightens slightly with interaction. Goal: Patient specific goal  Description  Patient specific goal  5/19/2019 1053 by Braden Delgado RN  Outcome: Ongoing  Note:   Pt set no goal and refused group  5/19/2019 0059 by Trupti Estrada RN  Outcome: Ongoing  Note:   Patient did not state a goal this shift. Problem: Pain:  Goal: Pain level will decrease  Description  Pain level will decrease  5/19/2019 1053 by Braden Delgado RN  Outcome: Ongoing  Note:   Pt C/O pain in his back, legs, rt elbow, shoulder, states it is chronic, asks for pain medication as often at possible and activities are not prevented  5/19/2019 0059 by Trupti Estrada RN  Outcome: Ongoing  Note:   Patient states her current pain level is 8. Goal: Control of acute pain  Description  Control of acute pain  5/19/2019 1053 by Braden Delgado RN  Outcome: Ongoing  Note:   Pt C/O pain in his back, legs, rt elbow, shoulder, states it is chronic, asks for pain medication as often at possible and activities are not prevented  5/19/2019 0059 by Trupti Estrada RN  Outcome: Ongoing  Note:   None verbalized this shift.   Goal: Control of chronic pain  Description  Control of chronic pain  5/19/2019 1053 by Braden Delgado RN  Outcome: Ongoing  Note:   Pt C/O pain in his back, legs, rt elbow, shoulder, states it is chronic, asks for pain medication as often at possible and activities are not prevented  5/19/2019 0059 by Trupti Estrada RN  Outcome: Ongoing  Note:   Patient states she continues with

## 2019-05-19 NOTE — PLAN OF CARE
Problem: Discharge Planning:  Goal: Discharged to appropriate level of care  Description  Discharged to appropriate level of care  5/19/2019 0059 by Zechariah Castro RN  Outcome: Ongoing  Note:   Patient states she is hopeful to return to her apartment at discharge. 5/18/2019 1511 by Ervin Garza RN  Outcome: Not Met This Shift  Note:   Discharge planners working with patient to achieve optimal discharge plan, specific to the needs of this patient. Problem: Suicide risk  Goal: Provide patient with safe environment  Description  Provide patient with safe environment  5/19/2019 0059 by Zechariah Castro RN  Outcome: Ongoing  Note:   Patient maintained in a safe environment. 15 minute visual checks continued. 5/18/2019 1511 by Ervin Garza RN  Outcome: Met This Shift  Note:   Maintained in safe and secure environment. Patient did not fill out safety plan this shift. Problem: Pain:  Goal: Pain level will decrease  Description  Pain level will decrease  5/19/2019 0059 by Zechariah Castro RN  Outcome: Ongoing  Note:   Patient states her current pain level is 8.  5/18/2019 1511 by Ervin Garza RN  Outcome: Ongoing  Note:   Patient reports elbow and back pain 8 out of 10. Patient taking tylenol for pain. Patient reports same pain after taking pain medications. Goal: Control of acute pain  Description  Control of acute pain  5/19/2019 0059 by Zechariah Castro RN  Outcome: Ongoing  Note:   None verbalized this shift. 5/18/2019 1511 by Ervin Garza RN  Outcome: Ongoing  Note:   Patient reports elbow and back pain 8 out of 10. Patient taking tylenol for pain. Patient reports same pain after taking pain medications. Goal: Control of chronic pain  Description  Control of chronic pain  5/19/2019 0059 by Zechariah Castro RN  Outcome: Ongoing  Note:   Patient states she continues with chronic generalized pain.   5/18/2019 1511 by Ervin Garza RN  Outcome: Ongoing  Note:   Patient reports elbow and back pain 8 out of 10. Patient taking tylenol for pain. Patient reports same pain after taking pain medications. Problem: Falls - Risk of:  Goal: Will remain free from falls  Description  Will remain free from falls  5/19/2019 0059 by Raji Alcazar RN  Outcome: Ongoing  Note:   No falls noted so far this shift. 5/18/2019 1511 by Zakia Morales RN  Outcome: Met This Shift  Note:   No falls were observed or reported so far this shift, gait steady when ambulating and wears non-skid slippers socks. Encourage patient to wear shower shoes while in the shower. Remains on fall precautions. Goal: Absence of physical injury  Description  Absence of physical injury  5/19/2019 0059 by Raji Alcazar RN  Outcome: Ongoing  Note:   None noted so far this shift. 5/18/2019 1511 by Zakia Morales RN  Outcome: Met This Shift  Note:   No falls were observed or reported so far this shift, gait steady when ambulating and wears non-skid slippers socks. Encourage patient to wear shower shoes while in the shower. Remains on fall precautions. Problem: Activity:  Goal: Sleeping patterns will improve  Description  Sleeping patterns will improve  5/19/2019 0059 by Raji Alcazar RN  Outcome: Ongoing  Note:   Patient states she feels her sleep pattern is starting to improve. 5/18/2019 1511 by Zakia Morales RN  Outcome: Ongoing  Note:   Pt slept for 5.5 hours last night     Problem: Anxiety:  Goal: Level of anxiety will decrease  Description  Level of anxiety will decrease  5/19/2019 0059 by Raji Alcazar RN  Outcome: Ongoing  Note:   Patient states she continues to feel moderately anxious this shift. 5/18/2019 1511 by Zakia Morales RN  Outcome: Ongoing  Note:   Patient reports high anxiety. Pt taking vistaril prn. Verbalized medication was somewhat effective.       Problem: Coping - Ineffective, Individual:  Goal: Ability to identify and develop effective coping behavior will improve  Description  Ability to identify and develop effective coping behavior will improve  5/19/2019 0059 by Mark Moore RN  Outcome: Ongoing  Note:   Ongoing. 5/18/2019 1511 by Curt Yanez RN  Outcome: Ongoing  Note:   Pt has poor coping     Problem: Coping:  Goal: Ability to identify problematic behaviors that deter socialization will improve  Description  Ability to identify problematic behaviors that deter socialization will improve  5/19/2019 0059 by Mark Moore RN  Outcome: Ongoing  Note:   Ongoing. 5/18/2019 1511 by Curt Yanez RN  Outcome: Ongoing  Note:   Pt continues to work on socialization problems  5/18/2019 1455 by Yuniel Velasquez  Outcome: Ongoing     Problem: Role Relationship:  Goal: Ability to demonstrate positive changes in social behaviors and relationships will improve  Description  Ability to demonstrate positive changes in social behaviors and relationships will improve  5/19/2019 0059 by Mark Moore RN  Outcome: Ongoing  Note:   Ongoing. 5/18/2019 1511 by Curt Yanez RN  Outcome: Ongoing  Note:   Pt continues to work on behavior problems  5/18/2019 1455 by Yuniel Velasquez  Outcome: Ongoing     Problem: KNOWLEDGE DEFICIT,EDUCATION,DISCHARGE PLAN  Goal: Knowledge - personal safety  5/19/2019 0059 by Mark Moore RN  Outcome: Ongoing  Note:   Patient verbalizes fair knowledge of current safety measures. 5/18/2019 1511 by Curt Yanez RN  Outcome: Ongoing  Note:   Maintained in safe and secure environment. Patient did not fill out safety plan this shift. Care plan reviewed with patient.   Patient does verbalize understanding of the plan of care and does not contribute to goal setting

## 2019-05-20 PROCEDURE — 6370000000 HC RX 637 (ALT 250 FOR IP): Performed by: PHYSICIAN ASSISTANT

## 2019-05-20 PROCEDURE — APPSS30 APP SPLIT SHARED TIME 16-30 MINUTES: Performed by: PHYSICIAN ASSISTANT

## 2019-05-20 PROCEDURE — 99232 SBSQ HOSP IP/OBS MODERATE 35: CPT | Performed by: PSYCHIATRY & NEUROLOGY

## 2019-05-20 PROCEDURE — 1240000000 HC EMOTIONAL WELLNESS R&B

## 2019-05-20 PROCEDURE — 90833 PSYTX W PT W E/M 30 MIN: CPT | Performed by: PSYCHIATRY & NEUROLOGY

## 2019-05-20 PROCEDURE — 94640 AIRWAY INHALATION TREATMENT: CPT

## 2019-05-20 PROCEDURE — 6370000000 HC RX 637 (ALT 250 FOR IP): Performed by: PSYCHIATRY & NEUROLOGY

## 2019-05-20 RX ORDER — MIRTAZAPINE 30 MG/1
30 TABLET, FILM COATED ORAL NIGHTLY
Status: DISCONTINUED | OUTPATIENT
Start: 2019-05-20 | End: 2019-05-23 | Stop reason: HOSPADM

## 2019-05-20 RX ADMIN — HYDROCHLOROTHIAZIDE 25 MG: 25 TABLET ORAL at 21:16

## 2019-05-20 RX ADMIN — ASPIRIN 81 MG 81 MG: 81 TABLET ORAL at 09:10

## 2019-05-20 RX ADMIN — QUETIAPINE FUMARATE 600 MG: 300 TABLET, EXTENDED RELEASE ORAL at 21:15

## 2019-05-20 RX ADMIN — BENZONATATE 100 MG: 100 CAPSULE ORAL at 09:10

## 2019-05-20 RX ADMIN — PANTOPRAZOLE SODIUM 40 MG: 40 TABLET, DELAYED RELEASE ORAL at 09:10

## 2019-05-20 RX ADMIN — HYOSCYAMINE SULFATE 375 MCG: 0.38 TABLET, EXTENDED RELEASE ORAL at 09:10

## 2019-05-20 RX ADMIN — ISOSORBIDE DINITRATE 10 MG: 10 TABLET ORAL at 09:11

## 2019-05-20 RX ADMIN — TRAMADOL HYDROCHLORIDE 50 MG: 50 TABLET, FILM COATED ORAL at 16:11

## 2019-05-20 RX ADMIN — CELECOXIB 200 MG: 200 CAPSULE ORAL at 09:10

## 2019-05-20 RX ADMIN — HYOSCYAMINE SULFATE 375 MCG: 0.38 TABLET, EXTENDED RELEASE ORAL at 21:15

## 2019-05-20 RX ADMIN — TRAMADOL HYDROCHLORIDE 50 MG: 50 TABLET, FILM COATED ORAL at 09:11

## 2019-05-20 RX ADMIN — HYDROXYZINE HYDROCHLORIDE 25 MG: 25 TABLET ORAL at 21:15

## 2019-05-20 RX ADMIN — LEVOTHYROXINE SODIUM 75 MCG: 75 TABLET ORAL at 06:17

## 2019-05-20 RX ADMIN — PANCRELIPASE LIPASE, PANCRELIPASE PROTEASE, PANCRELIPASE AMYLASE 1 CAPSULE: 10000; 32000; 42000 CAPSULE, DELAYED RELEASE ORAL at 09:10

## 2019-05-20 RX ADMIN — ARIPIPRAZOLE 2 MG: 2 TABLET ORAL at 09:10

## 2019-05-20 RX ADMIN — FOLIC ACID 1 MG: 1 TABLET ORAL at 09:10

## 2019-05-20 RX ADMIN — PANCRELIPASE LIPASE, PANCRELIPASE PROTEASE, PANCRELIPASE AMYLASE 1 CAPSULE: 10000; 32000; 42000 CAPSULE, DELAYED RELEASE ORAL at 13:30

## 2019-05-20 RX ADMIN — HYDROCHLOROTHIAZIDE 25 MG: 25 TABLET ORAL at 09:10

## 2019-05-20 RX ADMIN — ISOSORBIDE DINITRATE 10 MG: 10 TABLET ORAL at 21:15

## 2019-05-20 RX ADMIN — BENZONATATE 100 MG: 100 CAPSULE ORAL at 13:30

## 2019-05-20 RX ADMIN — MIRTAZAPINE 30 MG: 30 TABLET, FILM COATED ORAL at 21:15

## 2019-05-20 RX ADMIN — Medication 2 PUFF: at 09:52

## 2019-05-20 RX ADMIN — Medication 3000 MCG: at 09:10

## 2019-05-20 RX ADMIN — CARVEDILOL 3.12 MG: 3.12 TABLET, FILM COATED ORAL at 18:12

## 2019-05-20 RX ADMIN — TRAZODONE HYDROCHLORIDE 50 MG: 50 TABLET ORAL at 21:15

## 2019-05-20 RX ADMIN — PANCRELIPASE LIPASE, PANCRELIPASE PROTEASE, PANCRELIPASE AMYLASE 1 CAPSULE: 10000; 32000; 42000 CAPSULE, DELAYED RELEASE ORAL at 18:12

## 2019-05-20 RX ADMIN — CARVEDILOL 3.12 MG: 3.12 TABLET, FILM COATED ORAL at 09:10

## 2019-05-20 RX ADMIN — BENZONATATE 100 MG: 100 CAPSULE ORAL at 21:15

## 2019-05-20 RX ADMIN — CLOPIDOGREL BISULFATE 75 MG: 75 TABLET ORAL at 09:10

## 2019-05-20 ASSESSMENT — PAIN SCALES - GENERAL
PAINLEVEL_OUTOF10: 6
PAINLEVEL_OUTOF10: 8
PAINLEVEL_OUTOF10: 5

## 2019-05-20 ASSESSMENT — PAIN DESCRIPTION - ORIENTATION
ORIENTATION: MID
ORIENTATION: MID

## 2019-05-20 ASSESSMENT — PAIN DESCRIPTION - PAIN TYPE: TYPE: CHRONIC PAIN

## 2019-05-20 ASSESSMENT — PAIN DESCRIPTION - PROGRESSION
CLINICAL_PROGRESSION: NOT CHANGED
CLINICAL_PROGRESSION: NOT CHANGED

## 2019-05-20 ASSESSMENT — PAIN DESCRIPTION - ONSET
ONSET: ON-GOING
ONSET: ON-GOING

## 2019-05-20 ASSESSMENT — PAIN DESCRIPTION - DESCRIPTORS
DESCRIPTORS: ACHING
DESCRIPTORS: ACHING

## 2019-05-20 ASSESSMENT — PAIN DESCRIPTION - FREQUENCY
FREQUENCY: CONTINUOUS
FREQUENCY: CONTINUOUS

## 2019-05-20 ASSESSMENT — PAIN DESCRIPTION - LOCATION
LOCATION: BACK
LOCATION: BACK

## 2019-05-20 ASSESSMENT — PAIN DESCRIPTION - DIRECTION: RADIATING_TOWARDS: NON

## 2019-05-20 NOTE — GROUP NOTE
Group Therapy Note    Date: May 20    Group Start Time: 0900  Group End Time: 0930  Group Topic: [address] [facility]    Denae Kimbrough             Patient's Goal:   To talk to the doctor today.      Notes:   Goal met    Status After Intervention:  Improved    Participation Level: Interactive    Participation Quality: Appropriate, Attentive and Sharing      Speech:  normal      Thought Process/Content: Logical      Affective Functioning: Congruent      Mood: euthymic      Level of consciousness:  Oriented x4      Response to Learning: Able to verbalize current knowledge/experience, Capable of insight and Progressing to goal      Endings: None Reported    Modes of Intervention: Education, Support, Socialization and Activity      Discipline Responsible: Psychoeducational Specialist      Signature:  Denae Kimbrough

## 2019-05-20 NOTE — PLAN OF CARE
Problem: Discharge Planning:  Goal: Discharged to appropriate level of care  Description  Discharged to appropriate level of care  Outcome: Ongoing  Note:   Patient voices no needs before discharge. Discharge planner working with patient to achieve optimal discharge plans, specific to individual needs. Problem: Altered Mood, Depressive Behavior:  Goal: Able to verbalize acceptance of life and situations over which he or she has no control  Description  Able to verbalize acceptance of life and situations over which he or she has no control  Outcome: Ongoing  Note:   Patient verbalizes working on acceptance of situations over which she has no control. Encouraged patient to attend group therapy. Goal: Able to verbalize and/or display a decrease in depressive symptoms  Description  Able to verbalize and/or display a decrease in depressive symptoms  Outcome: Ongoing  Note:   Patient reports mood 8/10 with 10 being normal. Has blunt affect. Speech clear and relevant. Poor eye contact. Reports \"I don't know\" for hope for future and identifies no one as their support system. Goal: Ability to disclose and discuss suicidal ideas will improve  Description  Ability to disclose and discuss suicidal ideas will improve  Outcome: Ongoing  Note:   Patient denies suicidal ideations, no plan or intent to harm self. Patient remains on suicidal precautions 15 checks for safety. Instructed to seek staff as needed for thoughts of self harm. Goal: Able to verbalize support systems  Description  Able to verbalize support systems  Outcome: Ongoing  Note:   Patient reports no one as their support system. Goal: Absence of self-harm  Description  Absence of self-harm  Outcome: Ongoing  Note:   No self harm behaviors were observed or reported so far this shift. Remains on every 15 minutes precautions for safety.     Goal: Patient specific goal  Description  Patient specific goal  Outcome: Ongoing  Note:   Patient reports goal for today is to \"try to go to group\". This was met because patient did attend AM group. Goal: Participates in care planning  Description  Participates in care planning  Outcome: Ongoing  Note:   Patient discussed treatment plan with physician/medical staff, attending group, and compliant with medications. Problem: Pain:  Goal: Pain level will decrease  Description  Pain level will decrease  Outcome: Ongoing  Note:   Patient reports right shoulder and back pain 8 out of 10. Patient taking Tramadol for pain. Patient reports minor relief from pain after taking pain medications. Goal: Control of acute pain  Description  Control of acute pain  Outcome: Ongoing  Note:   Patient reports right shoulder and back pain 8 out of 10. Patient taking Tramadol for pain. Patient reports minor relief from pain after taking pain medications. Goal: Control of chronic pain  Description  Control of chronic pain  Outcome: Ongoing  Note:   Patient reports right shoulder and back pain 8 out of 10. Patient taking Tramadol for pain. Patient reports minor relief from pain after taking pain medications. Problem: Falls - Risk of:  Goal: Will remain free from falls  Description  Will remain free from falls  Outcome: Ongoing  Note:   No falls were observed or reported so far this shift, gait steady when ambulating and wears non-skid slippers socks. Encourage patient to wear shower shoes while in the shower. Remains on fall precautions. Goal: Absence of physical injury  Description  Absence of physical injury  Outcome: Ongoing  Note:   No sign of physical injury noted. Problem: Activity:  Goal: Sleeping patterns will improve  Description  Sleeping patterns will improve  Outcome: Ongoing  Note:   Patient slept 8.5 hours last night, reports they did sleep well. Encourage patient not to take naps during the day, relax several hours before bed and to take prescribed sleep meds as ordered.  Patient verbalized understanding. Problem: Anxiety:  Goal: Level of anxiety will decrease  Description  Level of anxiety will decrease  Outcome: Ongoing  Note:   Patient reports anxiety. Patient declined need for prn medication. Problem: Coping:  Goal: Ability to identify problematic behaviors that deter socialization will improve  Description  Ability to identify problematic behaviors that deter socialization will improve  Outcome: Ongoing  Note:   Patient working on behaviors to promote socialization. Group encouraged to gain insight. Problem: Role Relationship:  Goal: Ability to demonstrate positive changes in social behaviors and relationships will improve  Description  Ability to demonstrate positive changes in social behaviors and relationships will improve  Outcome: Ongoing  Note:   Patient working on behaviors to promote socialization. Group encouraged to gain insight. Problem: KNOWLEDGE DEFICIT,EDUCATION,DISCHARGE PLAN  Goal: Knowledge - personal safety  Outcome: Ongoing  Note:   Maintained in safe and secure environment. Patient did not fill out safety plan this shift. Care plan reviewed with patient. Patient verbalized understanding of the plan of care and contribute to goal setting.

## 2019-05-20 NOTE — BH NOTE
Group Therapy Note    Date: 5/19/2019  Start Time: 2000  End Time:  2020  Number of Participants: 1    Type of Group: Wrap-Up/Relaxation    Wellness Binder Information  Module Name:  None  Session Number:  None    Patient's Goal:  None    Notes:  None    Status After Intervention:  Unchanged    Participation Level: Interactive    Participation Quality: Appropriate      Speech:  normal      Thought Process/Content: Logical      Affective Functioning: Blunted      Mood: depressed      Level of consciousness:  Oriented x4      Response to Learning: Capable of insight      Endings: None Reported    Modes of Intervention: Education      Discipline Responsible: Registered Nurse      Signature:   Trupti Estrada RN

## 2019-05-20 NOTE — PLAN OF CARE
Problem: Discharge Planning:  Goal: Discharged to appropriate level of care  Description  Discharged to appropriate level of care  5/19/2019 2249 by Mark Moore RN  Outcome: Ongoing  Note:   Patient states she is hopeful to return to her apartment at discharge. 5/19/2019 1053 by Curt Yanez RN  Outcome: Not Met This Shift  Note:   Discharge planners working with patient to achieve optimal discharge plan, specific to the needs of this patient. Problem: Altered Mood, Depressive Behavior:  Goal: Able to verbalize acceptance of life and situations over which he or she has no control  Description  Able to verbalize acceptance of life and situations over which he or she has no control  5/19/2019 2249 by Mark Moore RN  Outcome: Ongoing  Note:   Patient verbalizes limited acceptance of situations over which she has no control. 5/19/2019 1053 by Curt Yanez RN  Outcome: Ongoing  Note:   Patient verbalizes little acceptance of situations over which she has no control. Encouraged patient to attend group therapy. Goal: Able to verbalize and/or display a decrease in depressive symptoms  Description  Able to verbalize and/or display a decrease in depressive symptoms  5/19/2019 2249 by Mark Moore RN  Outcome: Ongoing  Note:   Patient noted with a blunt affect and brightens at times with interaction. 5/19/2019 1053 by Curt Yanez RN  Outcome: Ongoing  Note:   Pt states that she is depressed, she is isolative and does not attend groups  Goal: Ability to disclose and discuss suicidal ideas will improve  Description  Ability to disclose and discuss suicidal ideas will improve  5/19/2019 2249 by Mark Moore RN  Outcome: Ongoing  Note:   Patient denies any suicidal thoughts so far this shift. 5/19/2019 1053 by Curt Yanez RN  Outcome: Met This Shift  Note:   Patient denies suicidal ideations, no plan or intent to harm self. Patient remains on suicidal precautions 15 checks for safety. Instructed to seek staff as needed for thoughts of self harm. Goal: Able to verbalize support systems  Description  Able to verbalize support systems  5/19/2019 2249 by Zhang Dugan RN  Outcome: Ongoing  Note:   Patient states she currently has no support system. 5/19/2019 1053 by Jennifer Rankin RN  Outcome: Not Met This Shift  Note:   Patient reports no one as their support system. Goal: Absence of self-harm  Description  Absence of self-harm  5/19/2019 2249 by Zhang Dugan RN  Outcome: Ongoing  Note:   No self harm noted so far this shift. 5/19/2019 1053 by Jennifer Rankin RN  Outcome: Met This Shift  Note:   No self harm behaviors were observed or reported so far this shift. Remains on every 15 minutes precautions for safety. Goal: Patient specific goal  Description  Patient specific goal  5/19/2019 2249 by Zhang Dugan RN  Outcome: Ongoing  Note:   Patient did not state a goal today. 5/19/2019 1053 by Jennifer Rankin RN  Outcome: Ongoing  Note:   Pt set no goal and refused group  Goal: Participates in care planning  Description  Participates in care planning  5/19/2019 2249 by Zhang Dugan RN  Outcome: Ongoing  Note:   Care plan reviewed with patient and fair understanding verbalized. 5/19/2019 1053 by Jennifer Rankin RN  Outcome: Met This Shift  Note:   This patient participates in care planning      Problem: Suicide risk  Goal: Provide patient with safe environment  Description  Provide patient with safe environment  5/19/2019 2249 by Zhang Dugan RN  Outcome: Ongoing  Note:   Patient maintained in a safe environment. 15 minute visual checks continued. 5/19/2019 1053 by Jennifer Rankni RN  Outcome: Met This Shift  Note:   Maintained in safe and secure environment. Patient did not fill out safety plan this shift.       Problem: Pain:  Goal: Pain level will decrease  Description  Pain level will decrease  5/19/2019 2249 by Zhang Dugan RN  Outcome: Ongoing  Note:   Patient states a current pain level of a 7.  5/19/2019 1053 by Ang Cleary RN  Outcome: Ongoing  Note:   Pt C/O pain in his back, legs, rt elbow, shoulder, states it is chronic, asks for pain medication as often at possible and activities are not prevented  Goal: Control of acute pain  Description  Control of acute pain  5/19/2019 2249 by Kris Cook RN  Outcome: Ongoing  Note:   None verbalized this shift. 5/19/2019 1053 by Ang Cleary RN  Outcome: Ongoing  Note:   Pt C/O pain in his back, legs, rt elbow, shoulder, states it is chronic, asks for pain medication as often at possible and activities are not prevented  Goal: Control of chronic pain  Description  Control of chronic pain  5/19/2019 2249 by Kris Cook RN  Outcome: Ongoing  Note:   Patient states she continues with chronic generalized pain. 5/19/2019 1053 by Ang Cleary RN  Outcome: Ongoing  Note:   Pt C/O pain in his back, legs, rt elbow, shoulder, states it is chronic, asks for pain medication as often at possible and activities are not prevented     Problem: Falls - Risk of:  Goal: Will remain free from falls  Description  Will remain free from falls  5/19/2019 2249 by Kris Cook RN  Outcome: Ongoing  Note:   No falls noted so far this shift. 5/19/2019 1053 by Ang Cleary RN  Outcome: Met This Shift  Note:   No falls were observed or reported so far this shift, gait steady when ambulating and wears non-skid slippers socks. Encourage patient to wear shower shoes while in the shower. Remains on fall precautions. Goal: Absence of physical injury  Description  Absence of physical injury  5/19/2019 2249 by Kris Cook RN  Outcome: Ongoing  Note:   No injury noted so far this shift. 5/19/2019 1053 by Ang Cleary RN  Outcome: Met This Shift  Note:   Free from injury at this time     Problem:  Activity:  Goal: Sleeping patterns will improve  Description  Sleeping patterns will improve  5/19/2019 2249 by Kris Cook RN  Outcome: Ongoing  Note:   Patient states she feels her sleep pattern is improving.  5/19/2019 1053 by Maura Dueñas RN  Outcome: Met This Shift  Note:   Patient slept 8.5 hours last night, reports they slept well. Encourage patient not to take naps during the day, relax several hours before bed and to take prescribed sleep meds as ordered. Patient verbalized understanding. Problem: Anxiety:  Goal: Level of anxiety will decrease  Description  Level of anxiety will decrease  5/19/2019 2249 by Fallon Acosta RN  Outcome: Ongoing  Note:   Patient states she continues with moderate anxiety this shift. 5/19/2019 1053 by Maura Dueñas RN  Outcome: Ongoing  Note:   Patient reports anxiety. Pt taking vistaril prn. Verbalized medication was effective. Problem: Coping - Ineffective, Individual:  Goal: Ability to identify and develop effective coping behavior will improve  Description  Ability to identify and develop effective coping behavior will improve  5/19/2019 2249 by Fallon Acosta RN  Outcome: Ongoing  Note:   Ongoing. 5/19/2019 1053 by Maura Dueñas RN  Outcome: Ongoing  Note:   Pt has poor coping skills, pt isolative and refuses group to learn new skills     Problem: Coping:  Goal: Ability to identify problematic behaviors that deter socialization will improve  Description  Ability to identify problematic behaviors that deter socialization will improve  5/19/2019 2249 by Fallon Acosta RN  Outcome: Ongoing  Note:   Ongoing. 5/19/2019 1053 by Maura Dueñas RN  Outcome: Ongoing  Note:   Pt has poor coping skills, pt isolative and refuses group to learn new skills     Problem: Role Relationship:  Goal: Ability to demonstrate positive changes in social behaviors and relationships will improve  Description  Ability to demonstrate positive changes in social behaviors and relationships will improve  5/19/2019 2249 by Fallon Acosta RN  Outcome: Ongoing  Note:   Ongoing.   5/19/2019 1053 by Ang Cleary RN  Outcome: Ongoing  Note:   Pt has poor coping skills, pt isolative and refuses group to learn new skills     Problem: KNOWLEDGE DEFICIT,EDUCATION,DISCHARGE PLAN  Goal: Knowledge - personal safety  5/19/2019 2249 by Kris Cook RN  Outcome: Ongoing  Note:   Patient verbalizes fair understanding of current safety precautions. 5/19/2019 1053 by Ang Cleary RN  Outcome: Ongoing  Note:   Maintained in safe and secure environment. Patient did not fill out safety plan this shift. Problem: Social interaction  Goal: Increased social interaction  5/19/2019 1505 by LA Parra  Outcome: Not Met This Shift  Intervention: Encourage increased socialization through group therapy  5/19/2019 1505 by LA Parra  Note:   Pt has not attended any group therapy sessions offered thus far today. Pt has been resting in her room the majority of the day but has not been seen out on the unit engaging in socialization with peers. Pt will continue to be encouraged to attend group therapy sessions and participate appropriately. Pt will continue to be encouraged to socialize with peers throughout the day. Pt will continue to progress toward social interaction goal.   Care plan reviewed with patient.   Patient does verbalize understanding of the plan of care and does not contribute to goal setting

## 2019-05-20 NOTE — PROGRESS NOTES
Department of Psychiatry  Progress Note     Chief Complaint:  Depression, major, recurrent ([address]     SUBJECTIVE:      Patient is feeling better in her mood over all since 4E admission. She is quite somatically preoccupied, asking for adjustments to her pain meds for various aches. She has a contract with pain management, so really these cannot be modified otherwise she will be in violation of that contract. Suicidal ideations: denies suicidal ideation.     Compliance with medications: good  Medication side effects: absent  ROS: Patient has new complaints:  yes - pain - as above - all chronicnight  Sleep quality: fair  Attending groups: Yes      OBJECTIVE      Medications  Current Facility-Administered Medications: albuterol sulfate  (90 Base) MCG/ACT inhaler 2 puff, 2 puff, Inhalation, Q6H PRN  albuterol sulfate  (90 Base) MCG/ACT inhaler 2 puff, 2 puff, Inhalation, Q6H PRN  alosetron (LOTRONEX) tablet 0.5 mg, 0.5 mg, Oral, BID  ARIPiprazole (ABILIFY) tablet 2 mg, 2 mg, Oral, Daily  aspirin chewable tablet 81 mg, 81 mg, Oral, Daily  benzonatate (TESSALON) capsule 100 mg, 100 mg, Oral, TID  carvedilol (COREG) tablet 3.125 mg, 3.125 mg, Oral, BID WC  clopidogrel (PLAVIX) tablet 75 mg, 75 mg, Oral, Daily  folic acid (FOLVITE) tablet 1 mg, 1 mg, Oral, Daily  hydrochlorothiazide (HYDRODIURIL) tablet 25 mg, 25 mg, Oral, BID  hyoscyamine (LEVBID) extended release tablet 375 mcg, 375 mcg, Oral, BID  vitamin B-12 (CYANOCOBALAMIN) tablet 3,000 mcg, 3,000 mcg, Oral, Daily  isosorbide dinitrate (ISORDIL) tablet 10 mg, 10 mg, Oral, TID  levothyroxine (SYNTHROID) tablet 150 mcg, 150 mcg, Oral, Weekly  levothyroxine (SYNTHROID) tablet 75 mcg, 75 mcg, Oral, Once per day on Mon Tue Wed Thu Fri Sat  lipase-protease-amylase (ZENPEP) 00574-47182 units delayed release capsule 1 capsule, 1 capsule, Oral, TID   lubiprostone (AMITIZA) capsule 24 mcg, 24 mcg, Oral, BID WC  mirtazapine (REMERON) tablet 15 mg, 15 mg, Oral, Nightly  celecoxib (CELEBREX) capsule 200 mg, 200 mg, Oral, Daily  pantoprazole (PROTONIX) tablet 40 mg, 40 mg, Oral, Daily  aclidinium (TUDORZA PRESSAIR) 400 MCG/ACT inhaler 1 puff, 1 puff, Inhalation, BID PRN  QUEtiapine (SEROQUEL XR) extended release tablet 600 mg, 600 mg, Oral, Nightly  nicotine (NICODERM CQ) 14 MG/24HR 1 patch, 1 patch, Transdermal, Daily  traMADol (ULTRAM) tablet 50 mg, 50 mg, Oral, Q6H PRN  traZODone (DESYREL) tablet 50 mg, 50 mg, Oral, Nightly PRN  magnesium hydroxide (MILK OF MAGNESIA) 400 MG/5ML suspension 30 mL, 30 mL, Oral, Daily PRN  hydrOXYzine (ATARAX) tablet 25 mg, 25 mg, Oral, TID PRN     Physical     height is 5' 4\" (1.626 m) and weight is 157 lb (71.2 kg). Her oral temperature is 96.7 °F (35.9 °C). Her blood pressure is 118/57 (abnormal) and her pulse is 62. Her respiration is 14 and oxygen saturation is 93%. Lab Results   Component Value Date    WBC 5.8 05/17/2019    HGB 12.2 05/17/2019    HCT 38.5 05/17/2019     05/17/2019    CHOL 186 05/06/2019    TRIG 395 (H) 05/06/2019    HDL 26 05/06/2019    LDLDIRECT 137 (H) 02/25/2019    ALT 26 05/17/2019    AST 20 05/17/2019     05/17/2019    K 4.1 05/17/2019    CL 96 (L) 05/17/2019    CREATININE 1.1 05/17/2019    BUN 16 05/17/2019    CO2 23 05/17/2019    TSH 1.420 05/17/2019    INR 1.13 05/05/2019    LABA1C 6.5 (H) 04/19/2018          Mental Status Examination:    Level of consciousness:  Within normal limits  Appearance: seated in chair, good grooming  Behavior/Motor: No abnormal movements, tics or mannerisms  Gait: no abnormalities noted. Attitude toward examiner: cooperative, attentive, good eye contact.   Speech:  Spontaneous, normal rate and volume  Mood: dysthymic  Affect: reactive  Thought processes: linear, goal directed, coherent  Thought content: Suicidal ideation:  denies         Homicidal ideations: denies            Hallucinations: denies         Delusions: denies  Cognition:  intact  Memory: in tact  Insight today to help with her mood and anxiety. She understood and agreed to the plan. Patient was having some fleeting suicidal thoughts with no intent or plan. She contracts to safety here on the unit. Case discussed with staff and the treatment team this morning. More than 16 mins of the session was spent doing Supportive psychotherapy. Session started at 8:30am and ended at 9:00am.     Electronically signed by Refugio Wells MD on 5/20/19 at 6:40 PM    **This report has been created using voice recognition software. It may contain minor errors which are inherent in voice recognition technology. **

## 2019-05-20 NOTE — PLAN OF CARE
Patient has attended all of the groups so far today and has also been out of her room for socialization with others so she has met her socialization goal for this shift.

## 2019-05-20 NOTE — PROGRESS NOTES
[facility]  Day 3 Interdisciplinary Treatment Plan NOTE    Review Date & Time: 05/20/19 1400    Patient was in treatment team    Admission Type:   Admission Type: Voluntary    Reason for admission:  Reason for Admission: Suicidal thoughts  Estimated Length of Stay Update:  05/24/19  Estimated Discharge Date Update: 05/22/19    PATIENT STRENGTHS:  Patient Strengths Strengths: Communication  Patient Strengths and Limitations:Limitations: Inappropriate/potentially harmful leisure interests  Addictive Behavior:Addictive Behavior  In the past 3 months, have you felt or has someone told you that you have a problem with:  : None  Do you have a history of Chemical Use?: No  Do you have a history of Alcohol Use?: No  Do you have a history of Street Drug Abuse?: Yes  Histroy of Prescripton Drug Abuse?: No  Medical Problems:  Past Medical History:   Diagnosis Date    Allergic rhinitis     Arthritis     spine    [facility]     Blood circulation, collateral     Cancer ([facility] 2011    cancerous polyps removed - Dr. Dakota Pearl Colon polyps     COPD (chronic obstructive pulmonary disease) ([facility] 7/24/2014    Depression     Diverticulitis of colon     GERD (gastroesophageal reflux disease)     Hiatal hernia     History of colonoscopy 2002    History of kidney stones     Hx of blood clots 6/17/2014    PE and collar bone area after shoulder surgery    Hyperlipidemia     Hypertension     Liver disease     enlarged liver - damaged with alcohol in past but no cirrhosis per patient    Pneumonia 7/24/2014    Suicidal thoughts     2015 admitted to  from [facility]    Thyroid disease     Vomiting     Wears dentures     Wears glasses        Risk:  Fall RiskTotal: 91  Juan Pablo Scale Juan Pablo Scale Score: 20  BVC Total: 0      Status EXAM:   Status and Exam  Normal: No  Facial Expression: Flat  Affect: Blunt  Level of Consciousness: Alert  Mood:Normal: No  Mood: Depressed, Anxious  Motor Activity:Normal: No  Motor Activity: Decreased  Interview Behavior: Cooperative  Preception: [place] to Person, [place] to Time, [place] to Place, [place] to Situation  Attention:Normal: Yes  Attention: Distractible  Thought Processes: Circumstantial  Thought Content:Normal: Yes  Thought Content: Paranoia  Hallucinations: Auditory (Comment)(\"ringing in ears like locus\")  Delusions: No  Memory:Normal: No  Memory: Poor Recent, Poor Remote  Insight and Judgment: No  Insight and Judgment: Poor Judgment, Poor Insight  Present Suicidal Ideation: No  Present Homicidal Ideation: No    Daily Assessment Last Entry:   Daily Sleep (WDL): Within Defined Limits         Patient Currently in Pain: Yes  Daily Nutrition (WDL): Within Defined Limits    Patient Monitoring:  Frequency of Checks: 4 times per hour, close    Psychiatric Symptoms:   Depression Symptoms  Depression Symptoms: Feelings of helplessness, Feelings of hopelessess, Feelings of worthlessness  Anxiety Symptoms  Anxiety Symptoms: Generalized  Radha Symptoms  Radha Symptoms: No problems reported or observed. Psychosis Symptoms  Delusion Type: No problems reported or observed. Suicide Risk CSSR-S:  1) Within the past month, have you wished you were dead or wished you could go to sleep and not wake up? : Yes  2) Have you actually had any thoughts of killing yourself? : Yes  3) Have you been thinking about how you might kill yourself? : Yes  5) Have you started to work out or worked out the details of how to kill yourself?  Do you intend to carry out this plan? : Yes  6) Have you ever done anything, started to do anything, or prepared to do anything to end your life?: Yes        EDUCATION:   Learner Progress Toward Treatment Goals: Reviewed results and recommendations of this team, Reviewed group plan and strategies, Reviewed signs, symptoms and risk of self harm and violent behavior and Reviewed goals and plan of care    Method: Individual    Outcome: Needs reinforcement, No evidence of Learning and Refused Education    PATIENT GOALS: To get support. PLAN/TREATMENT RECOMMENDATIONS UPDATE:  1. How are you progressing toward meeting your main treatment goal? Pt refused participation. Remained in bed. 2.  Are there discharge barriers/lingering problems that need to be addressed? Minimizes substance use. 3.  Do you have the ability to pay for your medications? [address]     4. How is your group participation?   Not attending    GOALS UPDATE:   Time frame for Short-Term Goals: Daily      REYES Moser

## 2019-05-20 NOTE — GROUP NOTE
Group Therapy Note    Date: May 20    Group Start Time: 1000  Group End Time: 1050  Group Topic: Recreational    [facility] 4E    Elver Moe               Patient's Goal:  Increase socialization and concentration. Notes:   Patient observed others only. Patient was social with others. Status After Intervention:  Improved    Participation Level:  Active Listener    Participation Quality: Attentive      Speech:  normal      Thought Process/Content: Logical      Affective Functioning: Congruent      Mood: euthymic      Level of consciousness:  Oriented x4      Response to Learning: Able to verbalize current knowledge/experience, Capable of insight and Progressing to goal      Endings: None Reported    Modes of Intervention: Socialization and Activity      Discipline Responsible: Psychoeducational Specialist      Signature:  Elver Moe

## 2019-05-21 PROCEDURE — 1240000000 HC EMOTIONAL WELLNESS R&B

## 2019-05-21 PROCEDURE — 99231 SBSQ HOSP IP/OBS SF/LOW 25: CPT | Performed by: PSYCHIATRY & NEUROLOGY

## 2019-05-21 PROCEDURE — 90833 PSYTX W PT W E/M 30 MIN: CPT | Performed by: PSYCHIATRY & NEUROLOGY

## 2019-05-21 PROCEDURE — APPSS30 APP SPLIT SHARED TIME 16-30 MINUTES: Performed by: PHYSICIAN ASSISTANT

## 2019-05-21 PROCEDURE — 94640 AIRWAY INHALATION TREATMENT: CPT

## 2019-05-21 PROCEDURE — 6370000000 HC RX 637 (ALT 250 FOR IP): Performed by: PHYSICIAN ASSISTANT

## 2019-05-21 PROCEDURE — 6370000000 HC RX 637 (ALT 250 FOR IP): Performed by: PSYCHIATRY & NEUROLOGY

## 2019-05-21 RX ADMIN — MIRTAZAPINE 30 MG: 30 TABLET, FILM COATED ORAL at 20:58

## 2019-05-21 RX ADMIN — PANTOPRAZOLE SODIUM 40 MG: 40 TABLET, DELAYED RELEASE ORAL at 08:09

## 2019-05-21 RX ADMIN — Medication 3000 MCG: at 08:12

## 2019-05-21 RX ADMIN — FOLIC ACID 1 MG: 1 TABLET ORAL at 08:09

## 2019-05-21 RX ADMIN — CLOPIDOGREL BISULFATE 75 MG: 75 TABLET ORAL at 08:09

## 2019-05-21 RX ADMIN — Medication 2 PUFF: at 11:43

## 2019-05-21 RX ADMIN — TRAMADOL HYDROCHLORIDE 50 MG: 50 TABLET, FILM COATED ORAL at 06:23

## 2019-05-21 RX ADMIN — BENZONATATE 100 MG: 100 CAPSULE ORAL at 20:58

## 2019-05-21 RX ADMIN — ISOSORBIDE DINITRATE 10 MG: 10 TABLET ORAL at 14:07

## 2019-05-21 RX ADMIN — TRAMADOL HYDROCHLORIDE 50 MG: 50 TABLET, FILM COATED ORAL at 12:41

## 2019-05-21 RX ADMIN — QUETIAPINE FUMARATE 600 MG: 300 TABLET, EXTENDED RELEASE ORAL at 20:58

## 2019-05-21 RX ADMIN — PANCRELIPASE LIPASE, PANCRELIPASE PROTEASE, PANCRELIPASE AMYLASE 1 CAPSULE: 10000; 32000; 42000 CAPSULE, DELAYED RELEASE ORAL at 08:09

## 2019-05-21 RX ADMIN — HYDROXYZINE HYDROCHLORIDE 25 MG: 25 TABLET ORAL at 20:59

## 2019-05-21 RX ADMIN — LEVOTHYROXINE SODIUM 75 MCG: 75 TABLET ORAL at 06:18

## 2019-05-21 RX ADMIN — BENZONATATE 100 MG: 100 CAPSULE ORAL at 14:07

## 2019-05-21 RX ADMIN — TRAMADOL HYDROCHLORIDE 50 MG: 50 TABLET, FILM COATED ORAL at 00:16

## 2019-05-21 RX ADMIN — HYDROXYZINE HYDROCHLORIDE 25 MG: 25 TABLET ORAL at 08:09

## 2019-05-21 RX ADMIN — HYDROCHLOROTHIAZIDE 25 MG: 25 TABLET ORAL at 20:58

## 2019-05-21 RX ADMIN — HYOSCYAMINE SULFATE 375 MCG: 0.38 TABLET, EXTENDED RELEASE ORAL at 20:59

## 2019-05-21 RX ADMIN — ISOSORBIDE DINITRATE 10 MG: 10 TABLET ORAL at 20:58

## 2019-05-21 RX ADMIN — ISOSORBIDE DINITRATE 10 MG: 10 TABLET ORAL at 09:20

## 2019-05-21 RX ADMIN — HYDROCHLOROTHIAZIDE 25 MG: 25 TABLET ORAL at 08:09

## 2019-05-21 RX ADMIN — HYOSCYAMINE SULFATE 375 MCG: 0.38 TABLET, EXTENDED RELEASE ORAL at 08:09

## 2019-05-21 RX ADMIN — PANCRELIPASE LIPASE, PANCRELIPASE PROTEASE, PANCRELIPASE AMYLASE 1 CAPSULE: 10000; 32000; 42000 CAPSULE, DELAYED RELEASE ORAL at 12:41

## 2019-05-21 RX ADMIN — PANCRELIPASE LIPASE, PANCRELIPASE PROTEASE, PANCRELIPASE AMYLASE 1 CAPSULE: 10000; 32000; 42000 CAPSULE, DELAYED RELEASE ORAL at 17:03

## 2019-05-21 RX ADMIN — BENZONATATE 100 MG: 100 CAPSULE ORAL at 08:09

## 2019-05-21 RX ADMIN — TRAMADOL HYDROCHLORIDE 50 MG: 50 TABLET, FILM COATED ORAL at 20:59

## 2019-05-21 RX ADMIN — CELECOXIB 200 MG: 200 CAPSULE ORAL at 08:09

## 2019-05-21 RX ADMIN — ASPIRIN 81 MG 81 MG: 81 TABLET ORAL at 08:09

## 2019-05-21 RX ADMIN — ARIPIPRAZOLE 2 MG: 2 TABLET ORAL at 08:09

## 2019-05-21 RX ADMIN — CARVEDILOL 3.12 MG: 3.12 TABLET, FILM COATED ORAL at 17:03

## 2019-05-21 RX ADMIN — TRAZODONE HYDROCHLORIDE 50 MG: 50 TABLET ORAL at 20:59

## 2019-05-21 RX ADMIN — CARVEDILOL 3.12 MG: 3.12 TABLET, FILM COATED ORAL at 09:19

## 2019-05-21 ASSESSMENT — PAIN SCALES - GENERAL
PAINLEVEL_OUTOF10: 8
PAINLEVEL_OUTOF10: 10
PAINLEVEL_OUTOF10: 6
PAINLEVEL_OUTOF10: 0
PAINLEVEL_OUTOF10: 7
PAINLEVEL_OUTOF10: 8
PAINLEVEL_OUTOF10: 10
PAINLEVEL_OUTOF10: 0
PAINLEVEL_OUTOF10: 0

## 2019-05-21 ASSESSMENT — PAIN DESCRIPTION - PAIN TYPE
TYPE: CHRONIC PAIN
TYPE_2: CHRONIC PAIN
TYPE: CHRONIC PAIN

## 2019-05-21 ASSESSMENT — PAIN DESCRIPTION - LOCATION
LOCATION: GENERALIZED
LOCATION: BACK
LOCATION_2: ELBOW
LOCATION: BACK

## 2019-05-21 ASSESSMENT — PAIN - FUNCTIONAL ASSESSMENT
PAIN_FUNCTIONAL_ASSESSMENT: ACTIVITIES ARE NOT PREVENTED

## 2019-05-21 ASSESSMENT — PAIN DESCRIPTION - PROGRESSION
CLINICAL_PROGRESSION_2: NOT CHANGED
CLINICAL_PROGRESSION: NOT CHANGED

## 2019-05-21 ASSESSMENT — PAIN DESCRIPTION - DESCRIPTORS
DESCRIPTORS: ACHING
DESCRIPTORS_2: ACHING
DESCRIPTORS: ACHING

## 2019-05-21 ASSESSMENT — PAIN DESCRIPTION - ONSET
ONSET: ON-GOING
ONSET_2: ON-GOING

## 2019-05-21 ASSESSMENT — PAIN DESCRIPTION - ORIENTATION
ORIENTATION: LOWER
ORIENTATION: LOWER
ORIENTATION_2: RIGHT

## 2019-05-21 ASSESSMENT — PAIN DESCRIPTION - FREQUENCY
FREQUENCY: CONTINUOUS

## 2019-05-21 ASSESSMENT — PAIN DESCRIPTION - DURATION: DURATION_2: CONTINUOUS

## 2019-05-21 ASSESSMENT — PAIN DESCRIPTION - INTENSITY: RATING_2: 8

## 2019-05-21 NOTE — GROUP NOTE
Group Therapy Note    Date: May 21    Group Start Time: 1630  Group End Time: 1700  Group Topic: Healthy Living/Wellness    [facility] 4[facility]    Paco Parsons RN             Notes:  Handouts given on Relaxation tips and stress management    Status After Intervention:  Unchanged    Participation Level:  Active Listener    Participation Quality: Attentive      Speech:  normal      Level of consciousness:  Alert      Modes of Intervention: Education and Activity      Discipline Responsible: Registered Nurse      Signature:  Paco Parsons RN

## 2019-05-21 NOTE — PLAN OF CARE
Problem: Altered Mood, Depressive Behavior:  Goal: Ability to disclose and discuss suicidal ideas will improve  Description  Ability to disclose and discuss suicidal ideas will improve  5/21/2019 1100 by Rachid Delgado RN  Outcome: Met This Shift  Note:   Patient denies suicidal ideations, no plan or intent to harm self. Patient remains on suicidal precautions 15 checks for safety. Instructed to seek staff as needed for thoughts of self harm. 5/21/2019 0111 by Alvin Hubbard RN  Outcome: Met This Shift  Note:   Denies suicidal ideations. Goal: Absence of self-harm  Description  Absence of self-harm  5/21/2019 1100 by Rachid Delgado RN  Outcome: Met This Shift  Note:   No self harm behaviors were observed or reported so far this shift. Remains on every 15 minutes precautions for safety. 5/21/2019 0111 by Alvin Hubbard RN  Outcome: Met This Shift  Note:   Pt remains safe and free from harm. Goal: Patient specific goal  Description  Patient specific goal  5/21/2019 1100 by Rachid Delgado RN  Outcome: Met This Shift  Note:   Come to meetings, talk to dr and talk about when I'm going home - met  5/21/2019 0111 by Alvin Hubbard RN  Outcome: Met This Shift  Note:   Try to attend groups. Goal: Participates in care planning  Description  Participates in care planning  5/21/2019 1100 by Rachid Delgado RN  Outcome: Met This Shift  Note:   This patient participates in care planning   5/21/2019 0111 by Alvin Hubbard RN  Outcome: Met This Shift  Note:   Care plan reviewed with patient and verbalize understanding of the plan of care and contribute to goal setting. Problem: KNOWLEDGE DEFICIT,EDUCATION,DISCHARGE PLAN  Goal: Knowledge - personal safety  5/21/2019 1100 by Rachid Delgado RN  Outcome: Ongoing  Note:   Maintained in safe and secure environment.  Patient did not fill out safety plan this shift.   5/21/2019 0111 by Alvin Hubbard RN  Outcome: Not Met This Shift  Note:   Encouraged to complete safety plan before discharge. Problem: Altered Mood, Depressive Behavior:  Goal: Able to verbalize acceptance of life and situations over which he or she has no control  Description  Able to verbalize acceptance of life and situations over which he or she has no control  5/21/2019 1100 by Nithya Kramer RN  Outcome: Ongoing  Note:   Patient verbalizes little acceptance of situations over which she has no control. Encouraged patient to attend group therapy. 5/21/2019 0111 by Lynn Mendoza RN  Outcome: Not Met This Shift  Note:   Does not accept this shift. Upset because son wants her to move into assisted living when she turns 60years old but then states she is having financial difficulty with paying her rent. Goal: Able to verbalize and/or display a decrease in depressive symptoms  Description  Able to verbalize and/or display a decrease in depressive symptoms  5/21/2019 1100 by Nithya Kramer RN  Outcome: Ongoing  Note:   Pt's mood is flat, brightens a little, isolates, attends some groups  5/21/2019 0111 by Lynn Mendoza RN  Outcome: Met This Shift  Note:   States \"kind of\" has depression. States it is better since admission. Rates her mood as \"alright\". Affect flat, brightens with interaction. Good eye contact. States she is \"unsure\" if she hope for the future. Good peer interaction. Problem: Pain:  Goal: Pain level will decrease  Description  Pain level will decrease  5/21/2019 1100 by Nithya Kramer RN  Outcome: Ongoing  Note:   Patient reports generalized pain 7 out of 10. Patient taking ultram for pain. Patient reports slightly lessened pain after taking pain medications. 5/21/2019 0111 by Lynn Mendoza RN  Outcome: Not Met This Shift  Note:   Complained of an lower back pain and right elbow pain which radiates to upper right shoulder and down to right hand. Rates her pain an 8 out of 10 with 10 being the worst. Was given tramadol with some relief. Problem:  Activity:  Goal: Sleeping Plans to return back to private residence. Problem: Altered Mood, Depressive Behavior:  Goal: Able to verbalize support systems  Description  Able to verbalize support systems  5/21/2019 1100 by Arnulfo Mora RN  Outcome: Not Met This Shift  Note:   Patient reports no as their support system. Has wrap around services  5/21/2019 0111 by Marsha Turk RN  Outcome: Not Met This Shift  Note:   Denies a support system this shift. Problem: Pain:  Goal: Control of acute pain  Description  Control of acute pain  5/21/2019 0111 by Marsha Turk RN  Outcome: Completed  Goal: Control of chronic pain  Description  Control of chronic pain  5/21/2019 0111 by Marsha Turk RN  Outcome: Completed     Problem: Falls - Risk of:  Goal: Will remain free from falls  Description  Will remain free from falls  5/21/2019 0111 by Marsha Turk RN  Outcome: Completed  Note:   Remained free from falls this shift. Goal: Absence of physical injury  Description  Absence of physical injury  5/21/2019 0111 by Marsha Turk RN  Outcome: Completed  Note:   Pt remains safe and free from harm. Care plan reviewed with patient.   Patient does verbalize understanding of the plan of care and does contribute to goal setting

## 2019-05-21 NOTE — FLOWSHEET NOTE
Spiritual Support Group Note    Number of Participants in Group: 6                              Time: 15:30    Goal: Relief from isolation and loneliness             Jayde Sharing             Self-understanding and gain insight              Acceptance and belonging            Recognize they are not alone                Socialization             Empowerment       Encouragement    Topic:  [x] Spiritual Wellness and Self Care                  [x] Hope                     [x] Connecting with Divine/Others        [x] Thankfulness and Gratitude               [x]  Meaningfulness and Purpose               [x] Forgiveness               [x] Peace               [x] Connect to [employer]     [] Other:    Participation Level:   [x] Active Listener   [] Minimal   [] Monopolizing   [] Interactive   [] No Participation   []  Other:     Attention:   [x] Alert   [] Distractible   [] Drowsy   [] Poor   [] Other:    Manner:   [x] Cooperative   [] Suspicious   [] Withdrawn   [] Guarded   [] Irritable   [] Inhospitable   [] Other:     Others Comments from Group:   During the Spiritual Support group, the patient actively participated as they discussed their plans, purpose and positive choices in life.

## 2019-05-21 NOTE — PROGRESS NOTES
Group Therapy Note    Date: 05/20/19  Start Time: 2000  End Time:  2020  Number of Participants:     Type of Group: Wrap-Up    Wellness Binder Information  Module Name:    Session Number:      Patient's Goal:  To try to attend groups     Notes:  Goal met     Status After Intervention:  Unchanged    Participation Level:  Active Listener and Interactive    Participation Quality: Appropriate      Speech:  normal      Thought Process/Content: Logical      Affective Functioning: Flat      Mood: anxious and depressed      Level of consciousness:  Alert      Response to Learning: Able to verbalize current knowledge/experience, Able to verbalize/acknowledge new learning and Able to retain information      Endings: None Reported    Modes of Intervention: Support and Socialization      Discipline Responsible: Registered Nurse      Signature:  Emma Barfield RN

## 2019-05-21 NOTE — BH NOTE
Group Therapy Note    Date: 5/21/2019  Start Time: 5788  End Time:  0920  Number of Participants:  12    Type of Group: Community Meeting/Goal Group    Patient's Goal:   Come to the meetings, talk to the doctor and talk about when I'm going home.      Notes:   Progressing to goal    Status After Intervention:  Improved    Participation Level: Interactive    Participation Quality: Appropriate, Attentive and Sharing      Speech:  normal      Thought Process/Content: Logical      Affective Functioning: Congruent      Mood: euthymic      Level of consciousness:  Oriented x4      Response to Learning: Able to verbalize current knowledge/experience, Capable of insight and Progressing to goal      Endings: None Reported    Modes of Intervention: Support, Socialization and Activity      Discipline Responsible: Psychoeducational Specialist      Signature:  Jj Sprague

## 2019-05-21 NOTE — PROGRESS NOTES
Department of Psychiatry  Progress Note     Chief Complaint:  Depression, major, recurrent ([address]     SUBJECTIVE:      Patient is feeling better in her mood, \"not bad\". Suicidal ideations: denies suicidal ideation. Compliance with medications: good  Medication side effects: absent  ROS: Patient has new complaints:  yes - pain - as above - all chronicnight  Sleep quality: good; feels rested today.   Attending groups: Yes      OBJECTIVE      Medications  Current Facility-Administered Medications: mirtazapine (REMERON) tablet 30 mg, 30 mg, Oral, Nightly  albuterol sulfate  (90 Base) MCG/ACT inhaler 2 puff, 2 puff, Inhalation, Q6H PRN  albuterol sulfate  (90 Base) MCG/ACT inhaler 2 puff, 2 puff, Inhalation, Q6H PRN  alosetron (LOTRONEX) tablet 0.5 mg, 0.5 mg, Oral, BID  ARIPiprazole (ABILIFY) tablet 2 mg, 2 mg, Oral, Daily  aspirin chewable tablet 81 mg, 81 mg, Oral, Daily  benzonatate (TESSALON) capsule 100 mg, 100 mg, Oral, TID  carvedilol (COREG) tablet 3.125 mg, 3.125 mg, Oral, BID WC  clopidogrel (PLAVIX) tablet 75 mg, 75 mg, Oral, Daily  folic acid (FOLVITE) tablet 1 mg, 1 mg, Oral, Daily  hydrochlorothiazide (HYDRODIURIL) tablet 25 mg, 25 mg, Oral, BID  hyoscyamine (LEVBID) extended release tablet 375 mcg, 375 mcg, Oral, BID  vitamin B-12 (CYANOCOBALAMIN) tablet 3,000 mcg, 3,000 mcg, Oral, Daily  isosorbide dinitrate (ISORDIL) tablet 10 mg, 10 mg, Oral, TID  levothyroxine (SYNTHROID) tablet 150 mcg, 150 mcg, Oral, Weekly  levothyroxine (SYNTHROID) tablet 75 mcg, 75 mcg, Oral, Once per day on Mon Tue Wed Thu Fri Sat  lipase-protease-amylase (ZENPEP) 00388-71934 units delayed release capsule 1 capsule, 1 capsule, Oral, TID WC  lubiprostone (AMITIZA) capsule 24 mcg, 24 mcg, Oral, BID   celecoxib (CELEBREX) capsule 200 mg, 200 mg, Oral, Daily  pantoprazole (PROTONIX) tablet 40 mg, 40 mg, Oral, Daily  aclidinium (TUDORZA PRESSAIR) 400 MCG/ACT inhaler 1 puff, 1 puff, Inhalation, BID PRN  QUEtiapine (SEROQUEL XR) extended release tablet 600 mg, 600 mg, Oral, Nightly  nicotine (NICODERM CQ) 14 MG/24HR 1 patch, 1 patch, Transdermal, Daily  traMADol (ULTRAM) tablet 50 mg, 50 mg, Oral, Q6H PRN  traZODone (DESYREL) tablet 50 mg, 50 mg, Oral, Nightly PRN  magnesium hydroxide (MILK OF MAGNESIA) 400 MG/5ML suspension 30 mL, 30 mL, Oral, Daily PRN  hydrOXYzine (ATARAX) tablet 25 mg, 25 mg, Oral, TID PRN     Physical     height is 5' 4\" (1.626 m) and weight is 157 lb (71.2 kg). Her oral temperature is 96.9 °F (36.1 °C). Her blood pressure is 107/57 (abnormal) and her pulse is 65. Her respiration is 16 and oxygen saturation is 94%. Lab Results   Component Value Date    WBC 5.8 05/17/2019    HGB 12.2 05/17/2019    HCT 38.5 05/17/2019     05/17/2019    CHOL 186 05/06/2019    TRIG 395 (H) 05/06/2019    HDL 26 05/06/2019    LDLDIRECT 137 (H) 02/25/2019    ALT 26 05/17/2019    AST 20 05/17/2019     05/17/2019    K 4.1 05/17/2019    CL 96 (L) 05/17/2019    CREATININE 1.1 05/17/2019    BUN 16 05/17/2019    CO2 23 05/17/2019    TSH 1.420 05/17/2019    INR 1.13 05/05/2019    LABA1C 6.5 (H) 04/19/2018          Mental Status Examination:    Level of consciousness:  Within normal limits  Appearance: seated in chair, good grooming  Behavior/Motor: No abnormal movements, tics or mannerisms  Gait: no abnormalities noted. Attitude toward examiner: cooperative, attentive, good eye contact. Speech:  Spontaneous, normal rate and volume  Mood: \"not bad\"  Affect: reactive  Thought processes: linear, goal directed, coherent  Thought content: Suicidal ideation:  denies         Homicidal ideations: denies            Hallucinations: denies         Delusions: denies  Cognition:  intact  Memory: in tact  Insight and Judgement are improving  Attention and concentration are improving      ASSESSMENT     Depression, major, recurrent (HCC)     PLAN  Patient's symptoms are improving   Medication adjustments: None.  Continue current medications. OT evaluation with potential for home health referral pending. Attempt to develop insight, psycho-education and supportive therapy conducted. Discussed ways to benefit from group therapy    Probable discharge: 1-2d  Follow up: [address]     Electronically signed by Jeremy Real PA-C on 5/21/2019 at 9:00 AM Reviewed patient's current plan of care and vital signs with nursing staff. Psychiatry Attending Attestation     I assessed this patient and [facility] I have reviewed the above documentation and I agree with the findings and treatment plan with the following updates. Patient feels better than before.  Mood and affect are better. Patient reports fleeting suicidal thoughts with no intent or plan. Patient notes that these thoughts are occurring less frequently. She notes pain brigs her mood down. Denies any homicidal thoughts, that was explored with the patient. Oriented to time place and person.  Recent and remote memory is intact. Patient feels hopeful.  Sleep and appetite is good. No side effect from medication reported.  Side-effect of medication were discussed with the patient . Patient is responding to current treatment.  Discharge soon, if patient continues to show improvement.  Case discussed with the staff. More than 16 mins of the session was spent doing Supportive psychotherapy. Session started at 9:00am and ended at 9:30am.     Electronically signed by Giovana Simons MD on 5/21/19 at 6:53 PM    **This report has been created using voice recognition software. It may contain minor errors which are inherent in voice recognition technology. **

## 2019-05-21 NOTE — PLAN OF CARE
Problem: Altered Mood, Depressive Behavior:  Goal: Able to verbalize and/or display a decrease in depressive symptoms  Description  Able to verbalize and/or display a decrease in depressive symptoms  5/21/2019 0111 by Ja Jose RN  Outcome: Met This Shift  Note:   States \"kind of\" has depression. States it is better since admission. Rates her mood as \"alright\". Affect flat, brightens with interaction. Good eye contact. States she is \"unsure\" if she hope for the future. Good peer interaction. 5/20/2019 1349 by Devin Pritchard RN  Outcome: Ongoing  Note:   Patient reports mood 8/10 with 10 being normal. Has blunt affect. Speech clear and relevant. Poor eye contact. Reports \"I don't know\" for hope for future and identifies no one as their support system. Goal: Ability to disclose and discuss suicidal ideas will improve  Description  Ability to disclose and discuss suicidal ideas will improve  5/21/2019 0111 by Ja Jose RN  Outcome: Met This Shift  Note:   Denies suicidal ideations. 5/20/2019 1349 by Devin Pritchard RN  Outcome: Ongoing  Note:   Patient denies suicidal ideations, no plan or intent to harm self. Patient remains on suicidal precautions 15 checks for safety. Instructed to seek staff as needed for thoughts of self harm. Goal: Absence of self-harm  Description  Absence of self-harm  5/21/2019 0111 by Ja Jose RN  Outcome: Met This Shift  Note:   Pt remains safe and free from harm. 5/20/2019 1349 by Devin Pritchard RN  Outcome: Ongoing  Note:   No self harm behaviors were observed or reported so far this shift. Remains on every 15 minutes precautions for safety. Goal: Patient specific goal  Description  Patient specific goal  5/21/2019 0111 by Ja Jose RN  Outcome: Met This Shift  Note:   Try to attend groups. 5/20/2019 1349 by Devin Pritchard RN  Outcome: Ongoing  Note:   Patient reports goal for today is to \"try to go to group\".  This was met because patient did attend AM group. Goal: Participates in care planning  Description  Participates in care planning  5/21/2019 0111 by Benigno Vidal RN  Outcome: Met This Shift  Note:   Care plan reviewed with patient and verbalize understanding of the plan of care and contribute to goal setting.     5/20/2019 1349 by Loren Acosta RN  Outcome: Ongoing  Note:   Patient discussed treatment plan with physician/medical staff, attending group, and compliant with medications. Problem: Discharge Planning:  Goal: Discharged to appropriate level of care  Description  Discharged to appropriate level of care  5/21/2019 0111 by Benigno Vidal RN  Outcome: Ongoing  Note:   Discharge planning in progress. Plans to return back to private residence. 5/20/2019 1349 by Loren Acosta RN  Outcome: Ongoing  Note:   Patient voices no needs before discharge. Discharge planner working with patient to achieve optimal discharge plans, specific to individual needs. Problem: Activity:  Goal: Sleeping patterns will improve  Description  Sleeping patterns will improve  5/21/2019 0111 by Benigno Vidal RN  Outcome: Ongoing  Note:   Slept 8.5 hours last evening. Requested trazodone for sleep. 5/20/2019 1349 by Loren Acosta RN  Outcome: Ongoing  Note:   Patient slept 8.5 hours last night, reports they did sleep well. Encourage patient not to take naps during the day, relax several hours before bed and to take prescribed sleep meds as ordered. Patient verbalized understanding. Problem: Coping:  Goal: Ability to identify problematic behaviors that deter socialization will improve  Description  Ability to identify problematic behaviors that deter socialization will improve  5/21/2019 0111 by Benigno Vidal RN  Outcome: Ongoing  5/20/2019 1349 by Loren Acosta RN  Outcome: Ongoing  Note:   Patient working on behaviors to promote socialization. Group encouraged to gain insight.       Problem: Role Relationship:  Goal: Ability to demonstrate positive changes in social behaviors and relationships will improve  Description  Ability to demonstrate positive changes in social behaviors and relationships will improve  5/21/2019 0111 by Marsha Turk RN  Outcome: Ongoing  5/20/2019 1349 by Brittany Mccormick RN  Outcome: Ongoing  Note:   Patient working on behaviors to promote socialization. Group encouraged to gain insight. Problem: KNOWLEDGE DEFICIT,EDUCATION,DISCHARGE PLAN  Goal: Knowledge - personal safety  5/21/2019 0111 by Marsha Turk RN  Outcome: Not Met This Shift  Note:   Encouraged to complete safety plan before discharge. 5/20/2019 1349 by Brittany Mccormick RN  Outcome: Ongoing  Note:   Maintained in safe and secure environment. Patient did not fill out safety plan this shift. Problem: Altered Mood, Depressive Behavior:  Goal: Able to verbalize acceptance of life and situations over which he or she has no control  Description  Able to verbalize acceptance of life and situations over which he or she has no control  5/21/2019 0111 by Marsha Turk RN  Outcome: Not Met This Shift  Note:   Does not accept this shift. Upset because son wants her to move into assisted living when she turns 60years old but then states she is having financial difficulty with paying her rent. 5/20/2019 1349 by Brittany Mccormick RN  Outcome: Ongoing  Note:   Patient verbalizes working on acceptance of situations over which she has no control. Encouraged patient to attend group therapy. Goal: Able to verbalize support systems  Description  Able to verbalize support systems  5/21/2019 0111 by Marsha Turk RN  Outcome: Not Met This Shift  Note:   Denies a support system this shift. 5/20/2019 1349 by Brittany Mccormick RN  Outcome: Ongoing  Note:   Patient reports no one as their support system.        Problem: Pain:  Goal: Pain level will decrease  Description  Pain level will decrease  5/21/2019 0111 by Lynn Mendoza RN  Outcome: Not Met This Shift  Note:   Complained of an lower back pain and right elbow pain which radiates to upper right shoulder and down to right hand. Rates her pain an 8 out of 10 with 10 being the worst. Was given tramadol with some relief.   5/20/2019 1349 by Teofilo Essex, RN  Outcome: Ongoing  Note:   Patient reports right shoulder and back pain 8 out of 10. Patient taking Tramadol for pain. Patient reports minor relief from pain after taking pain medications. Problem: Anxiety:  Goal: Level of anxiety will decrease  Description  Level of anxiety will decrease  5/21/2019 0111 by Lynn Mendoza RN  Outcome: Not Met This Shift  Note:   Continues to be anxious requested atarax with relief.   5/20/2019 1349 by Teofilo Essex, RN  Outcome: Ongoing  Note:   Patient reports anxiety. Patient declined need for prn medication. Problem: Pain:  Goal: Control of acute pain  Description  Control of acute pain  5/21/2019 0111 by Lynn Mendoza RN  Outcome: Completed  5/20/2019 1349 by Teofilo Essex, RN  Outcome: Ongoing  Note:   Patient reports right shoulder and back pain 8 out of 10. Patient taking Tramadol for pain. Patient reports minor relief from pain after taking pain medications. Goal: Control of chronic pain  Description  Control of chronic pain  5/21/2019 0111 by Lynn Mendoza RN  Outcome: Completed  5/20/2019 1349 by Teofilo Essex, RN  Outcome: Ongoing  Note:   Patient reports right shoulder and back pain 8 out of 10. Patient taking Tramadol for pain. Patient reports minor relief from pain after taking pain medications. Problem: Falls - Risk of:  Goal: Will remain free from falls  Description  Will remain free from falls  5/21/2019 0111 by Lynn Mendoza RN  Outcome: Completed  Note:   Remained free from falls this shift.    5/20/2019 1349 by Teofilo Essex, RN  Outcome: Ongoing  Note:   No falls were observed or reported so far this shift, gait steady when ambulating and wears non-skid slippers socks. Encourage patient to wear shower shoes while in the shower. Remains on fall precautions. Goal: Absence of physical injury  Description  Absence of physical injury  5/21/2019 0111 by Samantha Lim RN  Outcome: Completed  Note:   Pt remains safe and free from harm. 5/20/2019 1349 by Andi Rodrigues RN  Outcome: Ongoing  Note:   No sign of physical injury noted.

## 2019-05-22 PROCEDURE — 6370000000 HC RX 637 (ALT 250 FOR IP): Performed by: PSYCHIATRY & NEUROLOGY

## 2019-05-22 PROCEDURE — 90833 PSYTX W PT W E/M 30 MIN: CPT | Performed by: PSYCHIATRY & NEUROLOGY

## 2019-05-22 PROCEDURE — 97165 OT EVAL LOW COMPLEX 30 MIN: CPT

## 2019-05-22 PROCEDURE — 6370000000 HC RX 637 (ALT 250 FOR IP): Performed by: PHYSICIAN ASSISTANT

## 2019-05-22 PROCEDURE — 99232 SBSQ HOSP IP/OBS MODERATE 35: CPT | Performed by: PSYCHIATRY & NEUROLOGY

## 2019-05-22 PROCEDURE — 1240000000 HC EMOTIONAL WELLNESS R&B

## 2019-05-22 PROCEDURE — APPSS30 APP SPLIT SHARED TIME 16-30 MINUTES: Performed by: PHYSICIAN ASSISTANT

## 2019-05-22 RX ORDER — ARIPIPRAZOLE 5 MG/1
5 TABLET ORAL DAILY
Status: DISCONTINUED | OUTPATIENT
Start: 2019-05-23 | End: 2019-05-23 | Stop reason: HOSPADM

## 2019-05-22 RX ADMIN — PANCRELIPASE LIPASE, PANCRELIPASE PROTEASE, PANCRELIPASE AMYLASE 1 CAPSULE: 10000; 32000; 42000 CAPSULE, DELAYED RELEASE ORAL at 18:00

## 2019-05-22 RX ADMIN — QUETIAPINE FUMARATE 600 MG: 300 TABLET, EXTENDED RELEASE ORAL at 20:49

## 2019-05-22 RX ADMIN — HYDROCHLOROTHIAZIDE 25 MG: 25 TABLET ORAL at 20:49

## 2019-05-22 RX ADMIN — FOLIC ACID 1 MG: 1 TABLET ORAL at 09:34

## 2019-05-22 RX ADMIN — Medication 3000 MCG: at 09:33

## 2019-05-22 RX ADMIN — TRAZODONE HYDROCHLORIDE 50 MG: 50 TABLET ORAL at 20:48

## 2019-05-22 RX ADMIN — CELECOXIB 200 MG: 200 CAPSULE ORAL at 09:33

## 2019-05-22 RX ADMIN — HYDROXYZINE HYDROCHLORIDE 25 MG: 25 TABLET ORAL at 09:34

## 2019-05-22 RX ADMIN — BENZONATATE 100 MG: 100 CAPSULE ORAL at 16:03

## 2019-05-22 RX ADMIN — ASPIRIN 81 MG 81 MG: 81 TABLET ORAL at 09:34

## 2019-05-22 RX ADMIN — LEVOTHYROXINE SODIUM 75 MCG: 75 TABLET ORAL at 05:38

## 2019-05-22 RX ADMIN — ISOSORBIDE DINITRATE 10 MG: 10 TABLET ORAL at 20:49

## 2019-05-22 RX ADMIN — BENZONATATE 100 MG: 100 CAPSULE ORAL at 20:49

## 2019-05-22 RX ADMIN — HYOSCYAMINE SULFATE 375 MCG: 0.38 TABLET, EXTENDED RELEASE ORAL at 09:34

## 2019-05-22 RX ADMIN — BENZONATATE 100 MG: 100 CAPSULE ORAL at 09:33

## 2019-05-22 RX ADMIN — ARIPIPRAZOLE 2 MG: 2 TABLET ORAL at 09:34

## 2019-05-22 RX ADMIN — PANTOPRAZOLE SODIUM 40 MG: 40 TABLET, DELAYED RELEASE ORAL at 09:34

## 2019-05-22 RX ADMIN — TRAMADOL HYDROCHLORIDE 50 MG: 50 TABLET, FILM COATED ORAL at 05:38

## 2019-05-22 RX ADMIN — CLOPIDOGREL BISULFATE 75 MG: 75 TABLET ORAL at 09:34

## 2019-05-22 RX ADMIN — TRAMADOL HYDROCHLORIDE 50 MG: 50 TABLET, FILM COATED ORAL at 13:01

## 2019-05-22 RX ADMIN — PANCRELIPASE LIPASE, PANCRELIPASE PROTEASE, PANCRELIPASE AMYLASE 1 CAPSULE: 10000; 32000; 42000 CAPSULE, DELAYED RELEASE ORAL at 09:34

## 2019-05-22 RX ADMIN — PANCRELIPASE LIPASE, PANCRELIPASE PROTEASE, PANCRELIPASE AMYLASE 1 CAPSULE: 10000; 32000; 42000 CAPSULE, DELAYED RELEASE ORAL at 12:57

## 2019-05-22 RX ADMIN — CARVEDILOL 3.12 MG: 3.12 TABLET, FILM COATED ORAL at 09:34

## 2019-05-22 RX ADMIN — HYDROCHLOROTHIAZIDE 25 MG: 25 TABLET ORAL at 09:34

## 2019-05-22 RX ADMIN — HYDROXYZINE HYDROCHLORIDE 25 MG: 25 TABLET ORAL at 20:48

## 2019-05-22 RX ADMIN — ISOSORBIDE DINITRATE 10 MG: 10 TABLET ORAL at 09:34

## 2019-05-22 RX ADMIN — MIRTAZAPINE 30 MG: 30 TABLET, FILM COATED ORAL at 20:49

## 2019-05-22 RX ADMIN — TRAMADOL HYDROCHLORIDE 50 MG: 50 TABLET, FILM COATED ORAL at 20:48

## 2019-05-22 RX ADMIN — HYOSCYAMINE SULFATE 375 MCG: 0.38 TABLET, EXTENDED RELEASE ORAL at 20:49

## 2019-05-22 ASSESSMENT — PAIN DESCRIPTION - DESCRIPTORS
DESCRIPTORS: ACHING
DESCRIPTORS: ACHING

## 2019-05-22 ASSESSMENT — PAIN DESCRIPTION - ONSET
ONSET: ON-GOING
ONSET: ON-GOING

## 2019-05-22 ASSESSMENT — PAIN SCALES - GENERAL
PAINLEVEL_OUTOF10: 7
PAINLEVEL_OUTOF10: 7
PAINLEVEL_OUTOF10: 0
PAINLEVEL_OUTOF10: 0
PAINLEVEL_OUTOF10: 8
PAINLEVEL_OUTOF10: 8
PAINLEVEL_OUTOF10: 7
PAINLEVEL_OUTOF10: 6

## 2019-05-22 ASSESSMENT — PAIN DESCRIPTION - FREQUENCY
FREQUENCY: CONTINUOUS
FREQUENCY: CONTINUOUS

## 2019-05-22 ASSESSMENT — PAIN DESCRIPTION - LOCATION
LOCATION: GENERALIZED
LOCATION: GENERALIZED

## 2019-05-22 ASSESSMENT — PAIN DESCRIPTION - PROGRESSION
CLINICAL_PROGRESSION: NOT CHANGED
CLINICAL_PROGRESSION: NOT CHANGED

## 2019-05-22 ASSESSMENT — PAIN DESCRIPTION - PAIN TYPE: TYPE: CHRONIC PAIN

## 2019-05-22 NOTE — GROUP NOTE
Group Therapy Note    Date: May 22    Group Start Time: 1000  Group End Time: 1100  Group Topic: Recreational    [facility] Adult [facility] 4E    Angie Ma             Patient's Goal:   Increase socialization and concentration. Notes:   Patient was social with others. Patient demonstrated fair concentration while participating in craft activities.      Status After Intervention:  Improved    Participation Level: Interactive    Participation Quality: Appropriate, Attentive and Sharing      Speech:  normal      Thought Process/Content: Logical      Affective Functioning: Congruent      Mood: euthymic      Level of consciousness:  Oriented x4      Response to Learning: Able to verbalize current knowledge/experience, Capable of insight and Progressing to goal      Endings: None Reported    Modes of Intervention: Socialization and Activity      Discipline Responsible: Psychoeducational Specialist      Signature:  Angie Ma

## 2019-05-22 NOTE — BH NOTE
Group Therapy Note    Date: 5/21/2019  Start Time: 2000  End Time:  2020  Number of Participants: 1    Type of Group: Wrap-Up/Relaxation  Wellness Binder Information  Module Name:  None  Session Number:  None    Patient's Goal:  To go to groups    Notes:  Met    Status After Intervention:  Unchanged    Participation Level: Interactive    Participation Quality: Appropriate      Speech:  normal      Thought Process/Content: Logical      Affective Functioning: Blunted      Mood: depressed      Level of consciousness:  Oriented x4      Response to Learning: Capable of insight      Endings: Suicidality    Modes of Intervention: Education      Discipline Responsible: Registered Nurse      Signature:   Farooq Farooq RN

## 2019-05-22 NOTE — PROGRESS NOTES
[address]  INPATIENT OCCUPATIONAL THERAPY  [facility] ADULT [facility] 4E  EVALUATION    Time:  Time In: 9518  Time Out: 1411  Timed Code Treatment Minutes: 0 Minutes  Minutes: 16          Date: 2019  Patient Name: Rosemary Marte,   Gender: female      MRN: [id number]  : 1955  (66 y.o.)  Referring Practitioner: Rob Slater PA-C  Diagnosis: Depression major, recurrent  Additional Pertinent Hx: Per ER note on 19: 66 y.o. female who presents to Emergency Department with complaints or suicidal ideation and suicidal plan. Patient states that she got into an argument with her son who visited her yesterday and threatened to become her power of  so that he could put her in an assisted living facility. Patient also reports that she got into an argument with her neighbor and reports that nobody cares about her. Patient states that she had suicidal ideation and headache. She states that she has suicidal plan to cut her wrist with a razor blade and she has cut her wrists many times in the past 10 years. Patient has history of suicidal ideation and currently is a client of [employer]. Restrictions/Precautions:          Other position/activity restrictions: suicide precautions       Past Medical History:   Diagnosis Date    Allergic rhinitis     Arthritis     spine    [facility]     Blood circulation, collateral     Cancer ([facility]     cancerous polyps removed - Dr. Irma Salcedo Colon polyps     COPD (chronic obstructive pulmonary disease) ([facility] 2014    Depression     Diverticulitis of colon     GERD (gastroesophageal reflux disease)     Hiatal hernia     History of colonoscopy     History of kidney stones     Hx of blood clots 2014    PE and collar bone area after shoulder surgery    Hyperlipidemia     Hypertension     Liver disease     enlarged liver - damaged with alcohol in past but no cirrhosis per patient    Pneumonia 2014    Suicidal thoughts 2015 admitted to  from [address] Thyroid disease     Vomiting     Wears dentures     Wears glasses      Past Surgical History:   Procedure Laterality Date    ABDOMEN SURGERY      x4 removed cysts and ovary removed    CARDIAC SURGERY      heart caths, no stents    CARPAL TUNNEL RELEASE Bilateral 2016    CHOLECYSTECTOMY, LAPAROSCOPIC  6/12/15    Dr. Edwina Vázquez, ESOPHAGUS      ELBOW SURGERY Right 10/7/2004    Dr. Seng Romano Bilateral 2016    decompression   Dario Creed    [facility]    ROTATOR CUFF REPAIR  2004, 2014    right shoulder    SHOULDER SURGERY  2014    right    SKIN BIOPSY  2006    under left eye           Subjective  Chart Reviewed: Yes(orders, progress notes)  Patient assessed for rehabilitation services?: Yes  Family / Caregiver Present: No    Subjective: Pt up walking in hallway unassisted upon arrival of therapist    General:                 Pain: Pt reports chronic back pain-not limiting mobility during this eval           Social/Functional History:  Lives With: Friend(s)  Type of Home: Apartment  Home Layout: Two level(Pt reports she stays on the 1st level)  Home Access: Stairs to enter with rails(6)     Bathroom Shower/Tub: Tub/Shower unit  Bathroom Toilet: Standard       ADL Assistance: Independent  Homemaking Assistance: Independent       Ambulation Assistance: Independent  Transfer Assistance: Independent          Additional Comments: Pt was fully indep PLOF, without AD. Reports that she walks to the grocery store or friend or sister take her.      Objective      Overall Cognitive Status: Exceptions(Pt appears to be at baseline, though multiple c/o medical issues-chronic back & RUE issues)       Sensation  Overall Sensation Status: Impaired(reports numbness in R hand digits 4 & 5)             LUE AROM (degrees)  LUE AROM : WF          RUE AROM (degrees)  RUE AROM : [facility] LUE Strength  Gross LUE Strength: WFL     RUE Strength  Gross RUE Strength: WFL           ADL  LE Dressing: Supervision(slip on sandles)          Transfers  Sit to stand: Independent  Stand to sit: Independent    Balance  Sitting Balance: Independent  Standing Balance: Independent           Functional Mobility  Functional - Mobility Device: No device  Activity: Other(around 4E hallways)  Assist Level: Independent  Functional Mobility Comments: steady without LOB               Activity Tolerance:  Activity Tolerance: Patient Tolerated treatment well    Treatment Initiated:  S.W. Reports to this therapist that Pt uses her money for drugs at home, then does not have money for other needs. Therapist briefly discussed with this Pt importance of taking prescription medications for mood stabilization. Per ER note Pt has a 40 year history of cocaine use, recommend f/u with Lilia Tinoco at discharge. Assessment:  Assessment: Pt appears at baseline, indep with ADLs & functional mobility. Prognosis: Good  No Skilled OT: Independent with ADL's, At baseline function, Independent with functional mobility    Clinical Decision Making: Clinical Decision making was of Low Complexity as the result of analysis of data from a problem focused assessment, a consideration of a limited number of treatment options, no significant comorbidities affecting the plan of care and no modification or assistance required to complete the evaluation. Discharge Recommendations:       Patient Education:  Patient Education: OT role  Barriers to Learning: none    Equipment Recommendations:  Equipment Needed: No    Safety:  Safety Devices in place: Yes  Type of devices: (Pt left in 4E common room as Pt was prior to therapist's arrival)       Plan:  Times per week: No further OT indicated at this time.                Evaluation Complexity: Based on the findings of patient history, examination, clinical presentation, and decision making during this evaluation, this patient is of low complexity.

## 2019-05-22 NOTE — PLAN OF CARE
Problem: Discharge Planning:  Goal: Discharged to appropriate level of care  Description  Discharged to appropriate level of care  Outcome: Ongoing  Note:   Patient states she plans to return to her apartment at discharge. Problem: Altered Mood, Depressive Behavior:  Goal: Able to verbalize acceptance of life and situations over which he or she has no control  Description  Able to verbalize acceptance of life and situations over which he or she has no control  Outcome: Ongoing  Note:   Patient verbalizes fair acceptance of situations over which she has no control. Goal: Able to verbalize and/or display a decrease in depressive symptoms  Description  Able to verbalize and/or display a decrease in depressive symptoms  Outcome: Ongoing  Note:   Patient noted with a blunt affect and brightens slightly with interaction. Goal: Ability to disclose and discuss suicidal ideas will improve  Description  Ability to disclose and discuss suicidal ideas will improve  Outcome: Ongoing  Note:   Patient states suicidal thoughts this shift with no current plan. Patient contracts with staff for safety. Goal: Able to verbalize support systems  Description  Able to verbalize support systems  Outcome: Ongoing  Note:   Patient states she currently has no support system. Goal: Absence of self-harm  Description  Absence of self-harm  Outcome: Ongoing  Note:   No self harm noted so far this shift. Goal: Patient specific goal  Description  Patient specific goal  Outcome: Ongoing  Note:   Patient stated her goal was to go to groups. Patient stated she met this goal.  Goal: Participates in care planning  Description  Participates in care planning  Outcome: Ongoing  Note:   Care plan reviewed with patient and fair understanding verbalized. Problem: Pain:  Goal: Pain level will decrease  Description  Pain level will decrease  Outcome: Ongoing  Note:   Patient states she continues with chronic generalized pain of a 10.      Problem: Activity:  Goal: Sleeping patterns will improve  Description  Sleeping patterns will improve  Outcome: Ongoing  Note:   Patient states she feels her sleep patten has improved. Problem: Anxiety:  Goal: Level of anxiety will decrease  Description  Level of anxiety will decrease  Outcome: Ongoing  Note:   Patient states she continues to feel moderately anxious this shift. Problem: Coping:  Goal: Ability to identify problematic behaviors that deter socialization will improve  Description  Ability to identify problematic behaviors that deter socialization will improve  Outcome: Ongoing  Note:   Ongoing. Problem: Role Relationship:  Goal: Ability to demonstrate positive changes in social behaviors and relationships will improve  Description  Ability to demonstrate positive changes in social behaviors and relationships will improve  Outcome: Ongoing  Note:   Ongoing. Problem: KNOWLEDGE DEFICIT,EDUCATION,DISCHARGE PLAN  Goal: Knowledge - personal safety  Outcome: Ongoing  Note:   Patient verbalizes fair understanding of current safety precautions. 15 minute visual checks continued. Care plan reviewed with patient.   Patient does verbalize understanding of the plan of care and does contribute to goal setting

## 2019-05-22 NOTE — PLAN OF CARE
Problem: Altered Mood, Depressive Behavior:  Goal: Ability to disclose and discuss suicidal ideas will improve  Description  Ability to disclose and discuss suicidal ideas will improve  5/22/2019 1220 by Abdullahi Rodriguez RN  Outcome: Met This Shift  Note:   Patient denies suicidal ideations, no plan or intent to harm self. Patient remains on suicidal precautions 15 checks for safety. Instructed to seek staff as needed for thoughts of self harm. She shrugged and denied being suicidal  5/22/2019 0113 by Kavya Georges RN  Outcome: Ongoing  Note:   Patient states suicidal thoughts this shift with no current plan. Patient contracts with staff for safety. Goal: Absence of self-harm  Description  Absence of self-harm  5/22/2019 1220 by Abdullahi Rodriguez RN  Outcome: Met This Shift  Note:   No self harm behaviors were observed or reported so far this shift. Remains on every 15 minutes precautions for safety. 5/22/2019 0113 by Kavya Georges RN  Outcome: Ongoing  Note:   No self harm noted so far this shift. Goal: Participates in care planning  Description  Participates in care planning  5/22/2019 1220 by Abdullahi Rodriguez RN  Outcome: Met This Shift  Note:   This patient participates in care planning   5/22/2019 0113 by Kavya Georges RN  Outcome: Ongoing  Note:   Care plan reviewed with patient and fair understanding verbalized. Problem: Activity:  Goal: Sleeping patterns will improve  Description  Sleeping patterns will improve  5/22/2019 1220 by Abdullahi Rodriguez RN  Outcome: Met This Shift  Note:   Patient slept 6.5 hours last night, reports they  slept okay. Encourage patient not to take naps during the day, relax several hours before bed and to take prescribed sleep meds as ordered. Patient verbalized little understanding.    5/22/2019 0113 by Kavya Georges RN  Outcome: Ongoing  Note:   Patient states she feels her sleep patten has improved.      Problem: KNOWLEDGE DEFICIT,EDUCATION,DISCHARGE PLAN  Goal: Knowledge - personal safety  5/22/2019 1220 by Onetha Rita, RN  Outcome: Ongoing  Note:   Maintained in safe and secure environment. Patient did not fill out safety plan this shift.   5/22/2019 0113 by Haseeb Reynolds RN  Outcome: Ongoing  Note:   Patient verbalizes fair understanding of current safety precautions. 15 minute visual checks continued. Problem: Altered Mood, Depressive Behavior:  Goal: Patient specific goal  Description  Patient specific goal  5/22/2019 1220 by Onetha Rita, RN  Outcome: Ongoing  Note:   Come to groups today - ongoing - attends some  5/22/2019 0113 by Haseeb Reynolds RN  Outcome: Ongoing  Note:   Patient stated her goal was to go to groups. Patient stated she met this goal.     Problem: Anxiety:  Goal: Level of anxiety will decrease  Description  Level of anxiety will decrease  5/22/2019 1220 by Onetha Rita, RN  Outcome: Ongoing  Note:   Patient reports anxiety. Pt taking vistaril prn. Verbalized medication was somewhat effective. 5/22/2019 0113 by Haseeb Reynolds RN  Outcome: Ongoing  Note:   Patient states she continues to feel moderately anxious this shift. Problem: Coping:  Goal: Ability to identify problematic behaviors that deter socialization will improve  Description  Ability to identify problematic behaviors that deter socialization will improve  5/22/2019 1220 by Onetha Rita, RN  Outcome: Ongoing  Note:   Pt out on unit at times, isolative at times  5/22/2019 0113 by Haseeb Reynolds RN  Outcome: Ongoing  Note:   Ongoing. Problem: Role Relationship:  Goal: Ability to demonstrate positive changes in social behaviors and relationships will improve  Description  Ability to demonstrate positive changes in social behaviors and relationships will improve  5/22/2019 1220 by Onetha Rita, RN  Outcome: Ongoing  Note:   Pt out on unit at times, isolative at times  5/22/2019 0113 by Haseeb Reynolds RN  Outcome: Ongoing  Note:   Ongoing.      Problem: Discharge Planning:  Goal: Discharged to appropriate level of care  Description  Discharged to appropriate level of care  5/22/2019 1220 by Jeanie Flores RN  Outcome: Not Met This Shift  Note:   Discharge planners working with patient to achieve optimal discharge plan, specific to the needs of this patient. 5/22/2019 0113 by Eduardo Stokes RN  Outcome: Ongoing  Note:   Patient states she plans to return to her apartment at discharge. Problem: Altered Mood, Depressive Behavior:  Goal: Able to verbalize acceptance of life and situations over which he or she has no control  Description  Able to verbalize acceptance of life and situations over which he or she has no control  5/22/2019 1220 by Jeanie Flores RN  Outcome: Not Met This Shift  Note:   Patient verbalizes very little acceptance of situations over which she has no control. Encouraged patient to attend group therapy. 5/22/2019 0113 by Eduardo Stokes RN  Outcome: Ongoing  Note:   Patient verbalizes fair acceptance of situations over which she has no control. Goal: Able to verbalize and/or display a decrease in depressive symptoms  Description  Able to verbalize and/or display a decrease in depressive symptoms  5/22/2019 1220 by Jeanie Flores RN  Outcome: Not Met This Shift  Note:   Pt is flat sad and depressed, pt does not want to be discharged  5/22/2019 0113 by Eduardo Stokes RN  Outcome: Ongoing  Note:   Patient noted with a blunt affect and brightens slightly with interaction. Goal: Able to verbalize support systems  Description  Able to verbalize support systems  5/22/2019 1220 by Jeanie Flores RN  Outcome: Not Met This Shift  Note:   Patient reports no one as their support system. 5/22/2019 0113 by Eduardo Stokes RN  Outcome: Ongoing  Note:   Patient states she currently has no support system.      Problem: Pain:  Goal: Pain level will decrease  Description  Pain level will decrease  5/22/2019 1220 by Jeanie Flores RN  Outcome: Not Met This Shift  Note:   Patient reports generalized pain 8 out of 10. Patient taking Ultram for pain. Patient reports slightly lessened pain after taking pain medications. Asks for pain med as soon as she can have it  5/22/2019 0113 by Lakisha Ayala RN  Outcome: Ongoing  Note:   Patient states she continues with chronic generalized pain of a 10. Care plan reviewed with patient.   Patient does verbalize understanding of the plan of care and does contribute to goal setting

## 2019-05-22 NOTE — PROGRESS NOTES
Department of Psychiatry  Progress Note     Chief Complaint:  Depression, major, recurrent ([address]     SUBJECTIVE:      Shivani Shane is not feeling as good today as she was yesterday. She c/o pain yet, asks for pain meds (Tramadol) despite them being ordered here for her already. She feels depressed, denies suicidal ideations to me but did endorse having them to other staff members. She doesn't feel ready for discharge today, c/o feeling like she is outside looking down on her life and those in it. Suicidal ideations: denies suicidal ideation. Compliance with medications: good  Medication side effects: absent  ROS: Patient has new complaints:  No  Sleep quality: good; feels rested today.   Attending groups: Yes      OBJECTIVE      Medications  Current Facility-Administered Medications: mirtazapine (REMERON) tablet 30 mg, 30 mg, Oral, Nightly  albuterol sulfate  (90 Base) MCG/ACT inhaler 2 puff, 2 puff, Inhalation, Q6H PRN  albuterol sulfate  (90 Base) MCG/ACT inhaler 2 puff, 2 puff, Inhalation, Q6H PRN  alosetron (LOTRONEX) tablet 0.5 mg, 0.5 mg, Oral, BID  ARIPiprazole (ABILIFY) tablet 2 mg, 2 mg, Oral, Daily  aspirin chewable tablet 81 mg, 81 mg, Oral, Daily  benzonatate (TESSALON) capsule 100 mg, 100 mg, Oral, TID  carvedilol (COREG) tablet 3.125 mg, 3.125 mg, Oral, BID WC  clopidogrel (PLAVIX) tablet 75 mg, 75 mg, Oral, Daily  folic acid (FOLVITE) tablet 1 mg, 1 mg, Oral, Daily  hydrochlorothiazide (HYDRODIURIL) tablet 25 mg, 25 mg, Oral, BID  hyoscyamine (LEVBID) extended release tablet 375 mcg, 375 mcg, Oral, BID  vitamin B-12 (CYANOCOBALAMIN) tablet 3,000 mcg, 3,000 mcg, Oral, Daily  isosorbide dinitrate (ISORDIL) tablet 10 mg, 10 mg, Oral, TID  levothyroxine (SYNTHROID) tablet 150 mcg, 150 mcg, Oral, Weekly  levothyroxine (SYNTHROID) tablet 75 mcg, 75 mcg, Oral, Once per day on Mon Tue Wed Thu Fri Sat  lipase-protease-amylase (ZENPEP) 93805-59718 units delayed release capsule 1 capsule, 1 capsule, denies  Cognition:  intact  Memory:impaired recent memory  Insight and Judgement are improving  Attention and concentration are improving      ASSESSMENT     Depression, major, recurrent (HCC)   Cocaine abuse     PLAN    I spoke with Mercedes's son, Eduarda Mitchell, at length today. He is concerned about worsening cognitive impairment over the last few years, more significant x 3 weeks. He recalls that she was repeateign the same questions just a few minutes apart. She was not paying bills correctly and was facing shut off notices. She worries that she is not doing her ADL's at home. she has a 'big bag' of medications from multiple providers, he suspects she is not taking them correctly. She has a history of going to many different doctors \"she's a hypochondriac\". He has an appointment Friday at 10am for area on [facility] (I believe) to meet with her and help seek assisted living. As of now, she does not have any home health or other support at home. Of note, she told him on the phone yesterday that she was looking around the unit for a razor so she could attempt suicide. Patient's symptoms seem to have increased today, cause unknown at this time. Attempt to develop insight, psycho-education and supportive therapy conducted. Occupational therapy consult pending, need dispo recommendations in regards to her level of care needs and ability to care for herself independently or with home health. Look into if she can step down to [facility]    Probable discharge: TBD  Follow up: [facility]       Electronically signed by Chuck Xiong PA-C on 5/22/2019 at 9:21 AM Reviewed patient's current plan of care and vital signs with nursing staff. Psychiatry Attending Attestation     I assessed this patient and [facility]   I have reviewed the above documentation and I agree with the findings and treatment plan with the following updates. Lynn Miller is seen on the unit along with the medical student today. Patient states that she has been feeling terrible today. Patient reports that her mood is down and sad. She endorses poor motivation and anhedonia. Patient notes that severe pain is bringing her mood down. She reports having some trouble falling asleep and staying asleep. She reports very poor energy and concentration today. She is actively trying to participate in groups here. Discussed with her about making some medication adjustments to help with her mood today. She understood and agreed to the plan. Case discussed with staff and the treatment team this morning. More than 16 mins of the session was spent doing Supportive psychotherapy. Session started at 8:00am and ended at 8:30am.     Electronically signed by Sulema Conway MD on 5/22/19 at 3:31 PM    **This report has been created using voice recognition software. It may contain minor errors which are inherent in voice recognition technology. **

## 2019-05-22 NOTE — PROGRESS NOTES
Topic Depression    Start 1630    End 1700    Participation active listening      Response Listened to presentation      Behavior appropriate .  Sat quietly

## 2019-05-23 VITALS
OXYGEN SATURATION: 95 % | BODY MASS INDEX: 26.8 KG/M2 | HEIGHT: 64 IN | HEART RATE: 75 BPM | TEMPERATURE: 97.8 F | WEIGHT: 157 LBS | SYSTOLIC BLOOD PRESSURE: 118 MMHG | DIASTOLIC BLOOD PRESSURE: 56 MMHG | RESPIRATION RATE: 16 BRPM

## 2019-05-23 PROCEDURE — 99239 HOSP IP/OBS DSCHRG MGMT >30: CPT | Performed by: PSYCHIATRY & NEUROLOGY

## 2019-05-23 PROCEDURE — 6370000000 HC RX 637 (ALT 250 FOR IP): Performed by: PSYCHIATRY & NEUROLOGY

## 2019-05-23 PROCEDURE — 5130000000 HC BRIDGE APPOINTMENT

## 2019-05-23 RX ORDER — HYDROXYZINE HYDROCHLORIDE 25 MG/1
25 TABLET, FILM COATED ORAL 3 TIMES DAILY PRN
Qty: 45 TABLET | Refills: 0 | Status: SHIPPED | OUTPATIENT
Start: 2019-05-23 | End: 2019-06-07

## 2019-05-23 RX ORDER — ARIPIPRAZOLE 5 MG/1
5 TABLET ORAL DAILY
Qty: 30 TABLET | Refills: 0 | Status: ON HOLD | OUTPATIENT
Start: 2019-05-24 | End: 2019-12-25 | Stop reason: HOSPADM

## 2019-05-23 RX ORDER — MIRTAZAPINE 30 MG/1
30 TABLET, FILM COATED ORAL NIGHTLY
Qty: 30 TABLET | Refills: 0 | Status: SHIPPED | OUTPATIENT
Start: 2019-05-23 | End: 2019-12-11

## 2019-05-23 RX ADMIN — LEVOTHYROXINE SODIUM 75 MCG: 75 TABLET ORAL at 07:03

## 2019-05-23 RX ADMIN — CLOPIDOGREL BISULFATE 75 MG: 75 TABLET ORAL at 08:34

## 2019-05-23 RX ADMIN — HYDROCHLOROTHIAZIDE 25 MG: 25 TABLET ORAL at 08:34

## 2019-05-23 RX ADMIN — TRAMADOL HYDROCHLORIDE 50 MG: 50 TABLET, FILM COATED ORAL at 10:39

## 2019-05-23 RX ADMIN — TRAMADOL HYDROCHLORIDE 50 MG: 50 TABLET, FILM COATED ORAL at 04:50

## 2019-05-23 RX ADMIN — PANTOPRAZOLE SODIUM 40 MG: 40 TABLET, DELAYED RELEASE ORAL at 08:34

## 2019-05-23 RX ADMIN — HYDROXYZINE HYDROCHLORIDE 25 MG: 25 TABLET ORAL at 08:33

## 2019-05-23 RX ADMIN — HYOSCYAMINE SULFATE 375 MCG: 0.38 TABLET, EXTENDED RELEASE ORAL at 08:34

## 2019-05-23 RX ADMIN — BENZONATATE 100 MG: 100 CAPSULE ORAL at 08:34

## 2019-05-23 RX ADMIN — ARIPIPRAZOLE 5 MG: 5 TABLET ORAL at 08:34

## 2019-05-23 RX ADMIN — PANCRELIPASE LIPASE, PANCRELIPASE PROTEASE, PANCRELIPASE AMYLASE 1 CAPSULE: 10000; 32000; 42000 CAPSULE, DELAYED RELEASE ORAL at 08:34

## 2019-05-23 RX ADMIN — CELECOXIB 200 MG: 200 CAPSULE ORAL at 08:34

## 2019-05-23 RX ADMIN — CARVEDILOL 3.12 MG: 3.12 TABLET, FILM COATED ORAL at 08:34

## 2019-05-23 RX ADMIN — ASPIRIN 81 MG 81 MG: 81 TABLET ORAL at 08:34

## 2019-05-23 RX ADMIN — Medication 3000 MCG: at 08:34

## 2019-05-23 RX ADMIN — FOLIC ACID 1 MG: 1 TABLET ORAL at 08:34

## 2019-05-23 RX ADMIN — ISOSORBIDE DINITRATE 10 MG: 10 TABLET ORAL at 08:34

## 2019-05-23 ASSESSMENT — PAIN DESCRIPTION - FREQUENCY: FREQUENCY: CONTINUOUS

## 2019-05-23 ASSESSMENT — PAIN SCALES - GENERAL
PAINLEVEL_OUTOF10: 8
PAINLEVEL_OUTOF10: 8
PAINLEVEL_OUTOF10: 3
PAINLEVEL_OUTOF10: 8

## 2019-05-23 ASSESSMENT — PAIN DESCRIPTION - DESCRIPTORS: DESCRIPTORS: ACHING

## 2019-05-23 ASSESSMENT — PAIN DESCRIPTION - LOCATION: LOCATION: GENERALIZED

## 2019-05-23 ASSESSMENT — PAIN DESCRIPTION - PAIN TYPE: TYPE: CHRONIC PAIN

## 2019-05-23 ASSESSMENT — PAIN DESCRIPTION - ONSET: ONSET: ON-GOING

## 2019-05-23 ASSESSMENT — PAIN DESCRIPTION - PROGRESSION: CLINICAL_PROGRESSION: NOT CHANGED

## 2019-05-23 NOTE — PLAN OF CARE
harm self. Patient remains on suicidal precautions 15 checks for safety. Instructed to seek staff as needed for thoughts of self harm. She shrugged and denied being suicidal  Goal: Able to verbalize support systems  Description  Able to verbalize support systems  5/22/2019 2247 by Zhang Dugan RN  Outcome: Ongoing  Note:   Patient states she currently has no support system. 5/22/2019 1220 by Jennifer Rankin RN  Outcome: Not Met This Shift  Note:   Patient reports no one as their support system. Goal: Absence of self-harm  Description  Absence of self-harm  5/22/2019 2247 by Zhang Dugan RN  Outcome: Ongoing  Note:   No self harm noted so far this shift.  5/22/2019 1220 by Jennifer Rankin RN  Outcome: Met This Shift  Note:   No self harm behaviors were observed or reported so far this shift. Remains on every 15 minutes precautions for safety. Goal: Patient specific goal  Description  Patient specific goal  5/22/2019 2247 by Zhang Dugan RN  Outcome: Ongoing  Note:   Patient stated her goal was to go to groups. Patient stated she met this goal.  5/22/2019 1220 by Jennifer Rankin RN  Outcome: Ongoing  Note:   Come to groups today - ongoing - attends some  Goal: Participates in care planning  Description  Participates in care planning  5/22/2019 2247 by Zhang Dugan RN  Outcome: Ongoing  Note:   Care plan reviewed with patient and fair understanding verbalized. 5/22/2019 1220 by Jennifer Rankin RN  Outcome: Met This Shift  Note:   This patient participates in care planning      Problem: Pain:  Goal: Pain level will decrease  Description  Pain level will decrease  5/22/2019 2247 by Zhang Dugan RN  Outcome: Ongoing  Note:   Patient states she continues with chronic generalized pain of a 7.  5/22/2019 1220 by Jennifer Rankin RN  Outcome: Not Met This Shift  Note:   Patient reports generalized pain 8 out of 10. Patient taking Ultram for pain.   Patient reports slightly lessened pain after taking pain medications. Asks for pain med as soon as she can have it     Problem: Activity:  Goal: Sleeping patterns will improve  Description  Sleeping patterns will improve  5/22/2019 2247 by Norris Tidwell RN  Outcome: Ongoing  Note:   Patient states she feels her sleep pattern is starting to improve. 5/22/2019 1220 by Donaciano Lesches, RN  Outcome: Met This Shift  Note:   Patient slept 6.5 hours last night, reports they  slept okay. Encourage patient not to take naps during the day, relax several hours before bed and to take prescribed sleep meds as ordered. Patient verbalized little understanding. Problem: Anxiety:  Goal: Level of anxiety will decrease  Description  Level of anxiety will decrease  5/22/2019 2247 by Norris Tidwell RN  Outcome: Ongoing  Note:   Patient states she continues to feel moderately anxious. 5/22/2019 1220 by Donaciano Lesches, RN  Outcome: Ongoing  Note:   Patient reports anxiety. Pt taking vistaril prn. Verbalized medication was somewhat effective.       Problem: Coping:  Goal: Ability to identify problematic behaviors that deter socialization will improve  Description  Ability to identify problematic behaviors that deter socialization will improve  5/22/2019 2247 by Norris Tidwell RN  Outcome: Ongoing  Note:   Ongoing.  5/22/2019 1220 by Donaciano Lesches, RN  Outcome: Ongoing  Note:   Pt out on unit at times, isolative at times     Problem: Role Relationship:  Goal: Ability to demonstrate positive changes in social behaviors and relationships will improve  Description  Ability to demonstrate positive changes in social behaviors and relationships will improve  5/22/2019 2247 by Norris Tidwell RN  Outcome: Ongoing  Note:   Ongoing.  5/22/2019 1220 by Donaciano Lesches, RN  Outcome: Ongoing  Note:   Pt out on unit at times, isolative at times     Problem: KNOWLEDGE DEFICIT,EDUCATION,DISCHARGE PLAN  Goal: Knowledge - personal safety  5/22/2019 2247 by Norris Tidwell RN  Outcome: Ongoing  Note:   Patient verbalizes fair understanding of safety precautions. 5/22/2019 1220 by Jennifer Rankin RN  Outcome: Ongoing  Note:   Maintained in safe and secure environment. Patient did not fill out safety plan this shift. Care plan reviewed with patient.   Patient does verbalize understanding of the plan of care and does contribute to goal setting

## 2019-05-23 NOTE — DISCHARGE SUMMARY
Provider Discharge Summary     Patient ID:  Eboni Woodruff  [id number]  7 y.o.  1955    Admit date: 5/17/2019    Discharge date and time: 5/23/2019  10:23 AM     Admitting Physician: Meagan Real MD     Discharge Physician: Meagan Real MD    Admission Diagnoses: Depression, major, recurrent ([facility] [F33.9]    Discharge Diagnoses:      Depression, major, recurrent ([facility]     Patient Active Problem List   Diagnosis Code    GERD (gastroesophageal reflux disease) K21.9    Colon polyps K63.5    Chest pain, atypical R07.89    Gastroenteritis K52.9    Pneumonia J18.9    Shoulder pain M25.519    History of pulmonary embolism Z86.711    COPD (chronic obstructive pulmonary disease) ([facility] J44.9    Hypoglycemia E16.2    Tobacco use disorder F17.200    Anticoagulated on Coumadin Z51.81, Z79.01    Bipolar disorder ([facility] F31.9    Cannabis abuse F12.10    Cocaine abuse ([facility] F14.10    Alcohol dependence in remission ([facility] F10.21    Cholecystitis with cholelithiasis K80.10    GI bleed K92.2    Vomiting R11.10    Erosive esophagitis K22.10    Hypokalemia E87.6    HTN (hypertension), benign I10    Bipolar 1 disorder ([facility] F31.9    Chronic pain G89.29    Hiatal hernia K44.9    Chest pain R07.9    Vomiting R11.10    Erosive gastritis K29.60    H pylori ulcer K27.9, B96.81    Hematemesis K92.0    History of Helicobacter pylori infection Z86.19    Intractable abdominal pain R10.9    Intractable vomiting with nausea R11.2    Epigastric abdominal pain R10.13    Acute blood loss anemia D62    Chronic chest pain R07.9, G89.29    Hyponatremia B98.1    Metabolic acidosis T53.5    Essential hypertension I10    COPD with acute exacerbation (HCC) J44.1    Hypothyroidism E03.9    History of DVT (deep vein thrombosis) Z86.718    Depression F32.9    Tobacco abuse Z72.0    Hematemesis with nausea K92.0    Hypoxia R09.02    Pleural effusion, bilateral J90    Suicidal ideation R45.851    Bipolar disorder, in partial remission, most recent episode depressed ([address] F31.75    Bipolar II disorder ([address] D88.70    Diastolic dysfunction C37.2    Angina, class II ([address] I20.9    Unstable angina ([facility]) I20.0    Dyslipidemia E78.5    Electrolyte abnormality E87.8    COPD exacerbation ([facility]) J44.1    Acute respiratory insufficiency R06.89    Chronic diastolic heart failure ([facility]) I50.32    Tobacco use disorder F17.200    Nasal septal deviation J34.2    Hypertrophy of left inferior nasal turbinate J34.3    Rhinogenic headache R51    Asymmetrical sensorineural hearing loss H90.5    Tinnitus, left ear H93.12    Psychosis ([facility]) F29    Substance-induced psychotic disorder ([facility]) F19.959    Bipolar 1 disorder, depressed ([facility]) F31.9    Polysubstance abuse ([facility] F19.10    Major depressive disorder, recurrent ([facility]) F33.9    Stroke-like symptom R29.90    Right arm weakness R29.898    Acute confusional state F05    Paresthesias R20.2    Depression, major, recurrent ([facility]) F33.9    Depression with suicidal ideation F32.9, R45.851        Admission Condition: poor    Discharged Condition: stable    Indication for Admission: threat to self    History of Present Illnes (present tense wording is of findings from admission exam and are not necessarily indicative of current findings): The patient is a 66 y.o. female with significant past medical history of depression who presents with worsening depression and suicidal ideation with a plan to cut her wrist. She had argument with her son who wanted her to go assisted living facility.     Hospital Course:   Upon admission, Shari Alex was provided a safe secure environment, introduced to unit milieu. Patient participated in groups and individual therapies. Meds were adjusted as noted below. After few days of hospital care, patient began to feel improvement. Depression lifted, thoughts to harm self ceased. Sleep improved, appetite was good. On morning rounds 5/23/2019, Aleksandr Emerson  endorses feeling ready for discharge. Patient denies suicidal or homicidal ideations, denies hallucinations or delusions. Denies SE's from meds. It was decided that maximum benefit from hospital care had been achieved and patient can be discharged. Consults:   None    Significant Diagnostic Studies: Routine labs and diagnostics    Treatments: Psychotropic medications, therapy with group, milieu, and 1:1 with nurses, social workers, PA-C/CNP, and Attending physician.       Discharge Medications:  Current Discharge Medication List      START taking these medications    Details   hydrOXYzine (ATARAX) 25 MG tablet Take 1 tablet by mouth 3 times daily as needed for Anxiety  Qty: 45 tablet, Refills: 0         CONTINUE these medications which have CHANGED    Details   mirtazapine (REMERON) 30 MG tablet Take 1 tablet by mouth nightly  Qty: 30 tablet, Refills: 0      ARIPiprazole (ABILIFY) 5 MG tablet Take 1 tablet by mouth daily  Qty: 30 tablet, Refills: 0         CONTINUE these medications which have NOT CHANGED    Details   Cyanocobalamin (VITAMIN B-12) 3000 MCG SUBL Place 1 tablet under the tongue Daily  Qty: 30 tablet, Refills: 2      folic acid (FOLVITE) 1 MG tablet Take 1 tablet by mouth daily  Qty: 30 tablet, Refills: 3      aspirin 81 MG chewable tablet Take 1 tablet by mouth daily  Qty: 30 tablet, Refills: 3      hydrochlorothiazide (HYDRODIURIL) 25 MG tablet TAKE 1 TABLET BY MOUTH TWICE A DAY  Qty: 60 tablet, Refills: 0    Comments: Maximum Refills Reached      nabumetone (RELAFEN) 500 MG tablet Take 1 tablet by mouth 2 times daily  Qty: 30 tablet, Refills: 0      alosetron (LOTRONEX) 0.5 MG tablet Take 0.5 mg by mouth 2 times daily      lubiprostone (AMITIZA) 24 MCG capsule Take 24 mcg by mouth 2 times daily (with meals)      benzonatate (TESSALON PERLES) 100 MG capsule Take 100 mg by mouth 3 times daily      nystatin (MYCOSTATIN) POWD powder Apply 1 each topically 2 times daily      clopidogrel (PLAVIX) 75 MG tablet TAKE 1 TABLET BY MOUTH DAILY  Qty: 28 tablet, Refills: 3    Comments: Maximum Refills Reached      hydrOXYzine (VISTARIL) 50 MG capsule Take 50 mg by mouth 3 times daily as needed for Itching or Anxiety       traZODone (DESYREL) 100 MG tablet Take 2 tablets by mouth nightly  Qty: 30 tablet, Refills: 0      QUEtiapine (SEROQUEL XR) 300 MG extended release tablet Take 2 tablets by mouth nightly  Qty: 30 tablet, Refills: 0      !! levothyroxine (SYNTHROID) 150 MCG tablet Take 150 mcg by mouth once a week Weekly on Sunday      !! Albuterol Sulfate (VENTOLIN HFA IN) Inhale 90 mg into the lungs 2 times daily as needed (2 puffs)       isosorbide dinitrate (ISORDIL) 10 MG tablet TAKE 1 TABLET BY MOUTH 3 TIMES DAILY  Qty: 270 tablet, Refills: 3    Comments: Maximum Refills Reached      carvedilol (COREG) 3.125 MG tablet TAKE 1 TABLET BY MOUTH 2 TIMES DAILY (WITH MEALS)  Qty: 180 tablet, Refills: 3    Comments: Maximum Refills Reached      hyoscyamine  MCG TBCR extended release tablet Take by mouth 2 times daily      TUDORZA PRESSAIR 400 MCG/ACT AEPB inhaler 1 puff 2 times daily as needed (wheezing/SOB)   Refills: 0      !! levothyroxine (SYNTHROID) 75 MCG tablet Take 75 mcg by mouth Six times weekly everyday except Sunday take 150 mcg. pantoprazole (PROTONIX) 40 MG tablet Take 1 tablet by mouth daily Indications: Gastroesophageal Reflux Disease  Qty: 10 tablet, Refills: 0      !! albuterol (PROVENTIL HFA;VENTOLIN HFA) 108 (90 BASE) MCG/ACT inhaler Inhale 2 puffs into the lungs every 6 hours as needed for Wheezing       lipase-protease-amylase (ZENPEP) 5000 units delayed release capsule Take 2 capsules by mouth 3 times daily (with meals) And 1 capsule with snacks      nystatin-triamcinolone (MYCOLOG II) 873715-2.1 UNIT/GM-% cream Apply topically 4 times daily. Qty: 2 Tube, Refills: 1       !! - Potential duplicate medications found.  Please discuss with provider. STOP taking these medications       predniSONE (DELTASONE) 20 MG tablet Comments:   Reason for Stopping:              patient discharged on two or more antipsychotics because: Documented hx of three failed mono therapies. Discharge Exam:  Level of consciousness:  Within normal limits  Appearance: Street clothes, seated, with good grooming  Behavior/Motor: No abnormalities noted  Attitude toward examiner:  Cooperative, attentive, good eye contact  Speech:  spontaneous, normal rate, normal volume and well articulated  Mood:  euthymic  Affect:  Full range  Thought processes:  linear, goal directed and coherent  Thought content:  denies homicidal ideation  Suicidal Ideation:  denies suicidal ideation  Delusions:  no evidence of delusions  Perceptual Disturbance:  denies any perceptual disturbance  Cognition:  Intact  Memory: age appropriate  Insight & Judgement: fair  Medication side effects: denies     Disposition: home    Patient Instructions: Activity: activity as tolerated  1. Patient instructed to take medications regularly and follow up with outpatient appointments. Follow-up as scheduled with [facility] services       Signed:    Electronically signed by Sulema Conway MD on 5/23/19 at 10:23 AM    Time Spent on discharge is more than 35 minutes in the examination, evaluation, counseling and review of medications and discharge plan.

## 2019-05-23 NOTE — BH NOTE
Group Therapy Note    Date: 5/22/2019  Start Time: 2000  End Time:  2020  Number of Participants: 1    Type of Group: Wrap-Up/Relaxation    Wellness Binder Information  Module Name:  None  Session Number:  None    Patient's Goal:  To go to groups    Notes:  Met    Status After Intervention:  Unchanged    Participation Level: Interactive    Participation Quality: Appropriate      Speech:  normal      Thought Process/Content: Logical      Affective Functioning: Blunted      Mood: depressed      Level of consciousness:  Oriented x4      Response to Learning: Capable of insight      Endings: None Reported    Modes of Intervention: Education      Discipline Responsible: Registered Nurse      Signature:   Brandin Tovar RN

## 2019-05-23 NOTE — PLAN OF CARE
Problem: Discharge Planning:  Goal: Discharged to appropriate level of care  Description  Discharged to appropriate level of care  5/23/2019 1036 by Ching Vuong LPN  Outcome: Completed  5/22/2019 2247 by Audra Gutierrez RN  Outcome: Ongoing  Note:   Patient states she is hopeful to return to her apartment at discharge. Problem: KNOWLEDGE DEFICIT,EDUCATION,DISCHARGE PLAN  Goal: Knowledge - personal safety  5/23/2019 1117 by Ching Vuong LPN  Outcome: Completed  Note: 1. What are the warning signs when you begin thinking suicide or when you feel very distressed? Pace the floors  2. What can you do by yourself to take your mind off of the problem? Play on computer, eat  3. If you are unable to deal with your distressed mood alone, contact trusted family members or friends. List several people in case your first choices are not available. Lela Elliott, [facility]  4. Contact local professionals or emergency services if you continue to have suicidal thoughts or serious distress. [address] [place] [address] PHONE NUMBERS:        [phone]        [phone]        [phone] (for deaf or hearing impaired)    5/22/2019 2247 by Audra Gutierrez RN  Outcome: Ongoing  Note:   Patient verbalizes fair understanding of safety precautions.      Problem: Pain:  Goal: Pain level will decrease  Description  Pain level will decrease  5/23/2019 1036 by Ching Vuong LPN  Outcome: Completed  5/22/2019 2247 by Audra Gutierrez RN  Outcome: Ongoing  Note:   Patient states she continues with chronic generalized pain of a 7.  Goal: Control of acute pain  Description  Control of acute pain  Outcome: Completed  Goal: Control of chronic pain  Description  Control of chronic pain  Outcome: Completed     Problem: Falls - Risk of:  Goal: Will remain free from falls  Description  Will remain free from falls  Outcome: Completed  Goal: Absence of physical injury  Description  Absence of physical injury  Outcome: Completed     Problem: Activity:  Goal: Sleeping patterns will improve  Description  Sleeping patterns will improve  5/23/2019 1037 by Rivka Sethi LPN  Outcome: Completed  5/22/2019 2247 by Narayan Li RN  Outcome: Ongoing  Note:   Patient states she feels her sleep pattern is starting to improve. Problem: Anxiety:  Goal: Level of anxiety will decrease  Description  Level of anxiety will decrease  5/23/2019 1037 by Rivka Sethi LPN  Outcome: Completed  5/22/2019 2247 by Narayan Li RN  Outcome: Ongoing  Note:   Patient states she continues to feel moderately anxious. Problem: Coping:  Goal: Ability to identify problematic behaviors that deter socialization will improve  Description  Ability to identify problematic behaviors that deter socialization will improve  5/23/2019 1037 by Rivka Sethi LPN  Outcome: Completed  5/22/2019 2247 by Narayan Li RN  Outcome: Ongoing  Note:   Ongoing. Problem: Role Relationship:  Goal: Ability to demonstrate positive changes in social behaviors and relationships will improve  Description  Ability to demonstrate positive changes in social behaviors and relationships will improve  5/23/2019 1037 by Rivka Sethi LPN  Outcome: Completed  5/22/2019 2247 by Narayan Li RN  Outcome: Ongoing  Note:   Ongoing.      Problem: Social interaction  Goal: Increased social interaction  5/23/2019 1058 by Dona Perdomo  Outcome: Completed

## 2019-05-23 NOTE — PROGRESS NOTES
Discharge planning 1043-I spoke with Pt Son, Adry Heredia [phone]. We [facility]. Adry Heredia will be coming to [facility] tomorrow to help facilitate the assessment which is scheduled with [facility] [place] on the aging. The goal is to help pt receive home health care with the possibility of assisted living.

## 2019-05-23 NOTE — PROGRESS NOTES
Behavioral Health   Discharge Note    Pt discharged with followings belongings:   Dentures: Lowers, Uppers  Vision - Corrective Lenses: Glasses  Hearing Aid: None  Jewelry: None  Body Piercings Removed: N/A  Clothing: Footwear, Pants, Shirt, Socks, Undergarments (Comment)  Were All Patient Medications Collected?: Yes  Other Valuables: Cell phone, Jermaine De La Garza sent home with patient. Valuables retrieved from safe, Security envelope number:  [id number] and returned to patient. Patient left department with Departure Mode: With friend via Mobility at Departure: Ambulatory, discharged to Discharged to: Private Residence. \"An Important Message from [employer] About Your Rights\" form reviewed, signed n/a. Patient education on aftercare instructions: yes  Bridge Appointment completed: Reviewed Discharge Instructions with patient. Patient verbalizes understanding and agreement with the discharge plan using the teachback method. Patient verbalize understanding of AVS:  yes.     Status EXAM upon discharge:  Status and Exam  Normal: No  Facial Expression: Brightened  Affect: Blunt  Level of Consciousness: Alert  Mood:Normal: No  Mood: Anxious, Depressed  Motor Activity:Normal: Yes  Motor Activity: Decreased  Interview Behavior: Cooperative  Preception: [place] to Person, [place] to Time, [place] to Place, [place] to Situation  Attention:Normal: No  Attention: Distractible  Thought Processes: Circumstantial  Thought Content:Normal: Yes  Thought Content: Paranoia  Hallucinations: None  Delusions: No  Memory:Normal: No  Memory: Poor Recent  Insight and Judgment: No  Insight and Judgment: Poor Judgment, Poor Insight  Present Suicidal Ideation: No  Present Homicidal Ideation: No    Ana Chilel, MANUEL

## 2019-05-24 ENCOUNTER — TELEPHONE (OUTPATIENT)
Dept: ADMINISTRATIVE | Age: 64
End: 2019-05-24

## 2019-06-03 ENCOUNTER — HOSPITAL ENCOUNTER (OUTPATIENT)
Dept: AUDIOLOGY | Age: 64
Discharge: HOME | End: 2019-06-03
Payer: COMMERCIAL

## 2019-06-03 PROCEDURE — 92567 TYMPANOMETRY: CPT | Performed by: AUDIOLOGIST

## 2019-06-03 PROCEDURE — 92557 COMPREHENSIVE HEARING TEST: CPT | Performed by: AUDIOLOGIST

## 2019-06-03 NOTE — PROGRESS NOTES
ACCOUNT #: [de-identified]      AUDIOLOGICAL EVALUATION      REASON FOR TESTING:  The patient is stating increasing hearing loss and tinnitus in both ears but more so in her left ear. Her left ear also feels plugged. She continues to experience dizziness. OTOSCOPY: Unremarkable in both ears. AUDIOGRAM      Reliability: Good  Audiometer Used:  GSI-61    PURE TONES     RE    LE     [x]   [] WNL        [x]   [x] Mild    [x]   [x] Moderate       [x]   [] Mod-Severe   []   [x] Severe    []   [] Profound    TYPE     RE    LE    [x]   [x] SNHL    []   []  Conductive HL    []   [] Mixed HL      CONFIGURATION    RE    LE    []   [] Essentially Flat     [x]   [x]  Sloping  []   [] Steeply Sloping  []   []  Rising  []   [] Cookie Bite    SPEECH AUDIOMETRY   Right Left Sound Field Aided   PTA 31 dB 46 dB     SRT 25 dB 35 dB     SAT       MASKING       % WRS   QUIET 88% 88%      40 dBSL 40 dBSL     %WRS   NOISE              [facility]            Live Voice  [x]     Recorded  []     List   []     WORD RECOGNITION   RE    LE  []   []  Excellent    [x]   [x]  Good  []   [] Fair  []   [] Poor  []   [] Very Poor    TYMPANOGRAMS  RE    LE  [x]   [x]  Normal compliance    []   []  Reduced mobility  []   [] Hyper mobility  []   [] Normal middle ear pressure  []   [] Negative middle ear pressure  []   [] Positive middle ear pressure  []   [] Flat w/normal ECV  []   [] Flat w/large ECV  []   [] Patent PE tube  []   [] Non-Patent PE tube  []   [] Could Not Test    DISTORTION PRODUCT OTOACOUSTIC EMISSIONS SCREENING    Right Ear     [] Passed     [] Refer     [x] Did Not Test  Left Ear        [] Passed     [] Refer     [x] Did Not Test      COMMENTS: Normal hearing sloping to a moderately severe sensorineural hearing loss in the right ear and a mild sloping to severe sensorineural hearing loss in the left ear. Good word recognition ability in each ear. Normal middle ear function, bilaterally.   Essentially stable thresholds relative to previous audiogram from 01/27/2016. RECOMMENDATION(S): Further evaluation of the asymmetrical sensorineural hearing loss and dizziness is recommended. Bilateral amplification is recommended pending medical clearance. The patient would like to pursue behind the ear hearing aids. A prior authorization request for bilateral hearing aids will be submitted to the patient's insurance pending medical clearance.

## 2019-06-05 ENCOUNTER — OFFICE VISIT (OUTPATIENT)
Dept: CARDIOLOGY CLINIC | Age: 64
End: 2019-06-05
Payer: COMMERCIAL

## 2019-06-05 VITALS
HEART RATE: 68 BPM | WEIGHT: 155 LBS | BODY MASS INDEX: 26.46 KG/M2 | HEIGHT: 64 IN | SYSTOLIC BLOOD PRESSURE: 130 MMHG | DIASTOLIC BLOOD PRESSURE: 72 MMHG

## 2019-06-05 DIAGNOSIS — Z01.810 PREOPERATIVE CARDIOVASCULAR EXAMINATION: Primary | ICD-10-CM

## 2019-06-05 DIAGNOSIS — I10 ESSENTIAL HYPERTENSION: ICD-10-CM

## 2019-06-05 DIAGNOSIS — E78.5 HYPERLIPIDEMIA, UNSPECIFIED HYPERLIPIDEMIA TYPE: ICD-10-CM

## 2019-06-05 PROCEDURE — 1111F DSCHRG MED/CURRENT MED MERGE: CPT | Performed by: INTERNAL MEDICINE

## 2019-06-05 PROCEDURE — G8598 ASA/ANTIPLAT THER USED: HCPCS | Performed by: INTERNAL MEDICINE

## 2019-06-05 PROCEDURE — 99213 OFFICE O/P EST LOW 20 MIN: CPT | Performed by: INTERNAL MEDICINE

## 2019-06-05 PROCEDURE — 4004F PT TOBACCO SCREEN RCVD TLK: CPT | Performed by: INTERNAL MEDICINE

## 2019-06-05 PROCEDURE — 3017F COLORECTAL CA SCREEN DOC REV: CPT | Performed by: INTERNAL MEDICINE

## 2019-06-05 PROCEDURE — G8427 DOCREV CUR MEDS BY ELIG CLIN: HCPCS | Performed by: INTERNAL MEDICINE

## 2019-06-05 PROCEDURE — G8417 CALC BMI ABV UP PARAM F/U: HCPCS | Performed by: INTERNAL MEDICINE

## 2019-06-05 RX ORDER — ESCITALOPRAM OXALATE 10 MG/1
10 TABLET ORAL DAILY
Status: ON HOLD | COMMUNITY
End: 2021-02-22 | Stop reason: HOSPADM

## 2019-06-05 RX ORDER — ESCITALOPRAM OXALATE 20 MG/1
30 TABLET ORAL DAILY
COMMUNITY

## 2019-06-05 RX ORDER — RANITIDINE 150 MG/1
150 CAPSULE ORAL DAILY
COMMUNITY
End: 2019-12-11

## 2019-06-05 RX ORDER — FENOFIBRATE 160 MG/1
160 TABLET ORAL DAILY
COMMUNITY

## 2019-06-05 RX ORDER — HYDROCHLOROTHIAZIDE 25 MG/1
TABLET ORAL
Qty: 60 TABLET | Refills: 0 | Status: SHIPPED | OUTPATIENT
Start: 2019-06-05 | End: 2019-10-21 | Stop reason: SDUPTHER

## 2019-06-05 NOTE — PROGRESS NOTES
oDmitila Daniel  CARDIOLOGY  [address]  [address] [place] [address] Road [facility]  Dept: [phone]  Dept Fax: [phone]  Loc: [phone]    Visit Date: 6/5/2019    Ms. Reyna Vaughan is a 66 y.o. female  who presented for:  Chief Complaint   Patient presents with    6 Month Follow-Up    Coronary Artery Disease       HPI:   HPI   65 yo hx of UA/RCA 50% on DAPT on OMT who presents for follow-up. No definite CVA, CKD, DM II/Insulin use/CHF. EF 60%. Compliant with medications. No further drugs. Cr 0.7. [facility] 2017 without obstructive disease. No issues with a FOS or ADLs. ECG without Q-waves. No chest pain, angina, FONTAINE, orthopnea, PND, sob at rest, palpitations, LE edema, or syncope.           Current Outpatient Medications:     escitalopram (LEXAPRO) 20 MG tablet, Take 20 mg by mouth daily, Disp: , Rfl:     escitalopram (LEXAPRO) 10 MG tablet, Take 10 mg by mouth daily, Disp: , Rfl:     ranitidine (ZANTAC) 150 MG capsule, Take 150 mg by mouth daily, Disp: , Rfl:     fenofibrate 160 MG tablet, Take 160 mg by mouth daily, Disp: , Rfl:     hydrOXYzine (ATARAX) 25 MG tablet, Take 1 tablet by mouth 3 times daily as needed for Anxiety, Disp: 45 tablet, Rfl: 0    mirtazapine (REMERON) 30 MG tablet, Take 1 tablet by mouth nightly, Disp: 30 tablet, Rfl: 0    ARIPiprazole (ABILIFY) 5 MG tablet, Take 1 tablet by mouth daily, Disp: 30 tablet, Rfl: 0    Cyanocobalamin (VITAMIN B-12) 3000 MCG SUBL, Place 1 tablet under the tongue Daily, Disp: 30 tablet, Rfl: 2    folic acid (FOLVITE) 1 MG tablet, Take 1 tablet by mouth daily, Disp: 30 tablet, Rfl: 3    aspirin 81 MG chewable tablet, Take 1 tablet by mouth daily, Disp: 30 tablet, Rfl: 3    hydrochlorothiazide (HYDRODIURIL) 25 MG tablet, TAKE 1 TABLET BY MOUTH TWICE A DAY, Disp: 60 tablet, Rfl: 0    nabumetone (RELAFEN) 500 MG tablet, Take 1 tablet by mouth 2 times daily, Disp: 30 tablet, Rfl: 0    alosetron (LOTRONEX) 0.5 MG tablet, Take 0.5 mg by mouth 2 times daily, Disp: , Rfl:     lipase-protease-amylase (ZENPEP) 5000 units delayed release capsule, Take 2 capsules by mouth 3 times daily (with meals) And 1 capsule with snacks, Disp: , Rfl:     benzonatate (TESSALON PERLES) 100 MG capsule, Take 100 mg by mouth 3 times daily, Disp: , Rfl:     nystatin (MYCOSTATIN) POWD powder, Apply 1 each topically 2 times daily, Disp: , Rfl:     clopidogrel (PLAVIX) 75 MG tablet, TAKE 1 TABLET BY MOUTH DAILY, Disp: 28 tablet, Rfl: 3    nystatin-triamcinolone (MYCOLOG II) 925507-9.1 UNIT/GM-% cream, Apply topically 4 times daily. , Disp: 2 Tube, Rfl: 1    hydrOXYzine (VISTARIL) 50 MG capsule, Take 50 mg by mouth 3 times daily as needed for Itching or Anxiety , Disp: , Rfl:     traZODone (DESYREL) 100 MG tablet, Take 2 tablets by mouth nightly, Disp: 30 tablet, Rfl: 0    QUEtiapine (SEROQUEL XR) 300 MG extended release tablet, Take 2 tablets by mouth nightly (Patient taking differently: Take 600 mg by mouth nightly ), Disp: 30 tablet, Rfl: 0    Albuterol Sulfate (VENTOLIN HFA IN), Inhale 90 mg into the lungs 2 times daily as needed (2 puffs) , Disp: , Rfl:     isosorbide dinitrate (ISORDIL) 10 MG tablet, TAKE 1 TABLET BY MOUTH 3 TIMES DAILY, Disp: 270 tablet, Rfl: 3    carvedilol (COREG) 3.125 MG tablet, TAKE 1 TABLET BY MOUTH 2 TIMES DAILY (WITH MEALS), Disp: 180 tablet, Rfl: 3    TUDORZA PRESSAIR 400 MCG/ACT AEPB inhaler, 1 puff 2 times daily as needed (wheezing/SOB) , Disp: , Rfl: 0    levothyroxine (SYNTHROID) 75 MCG tablet, Take 75 mcg by mouth Six times weekly everyday except Sunday take 150 mcg., Disp: , Rfl:     pantoprazole (PROTONIX) 40 MG tablet, Take 1 tablet by mouth daily Indications: Gastroesophageal Reflux Disease, Disp: 10 tablet, Rfl: 0    albuterol (PROVENTIL HFA;VENTOLIN HFA) 108 (90 BASE) MCG/ACT inhaler, Inhale 2 puffs into the lungs every 6 hours as needed for Wheezing , Disp: , Rfl:     Past Medical History  Mauricio Albrecht SURGERY Right 10/7/2004    Dr. Seng Romano Bilateral 2016    decompression   [address]    Dr. Loretta Moran with [facility]    ROTATOR CUFF REPAIR  2004, 2014    right shoulder    SHOULDER SURGERY  2014    right    SKIN BIOPSY  2006    under left eye       Review of Systems   Constitutional: Negative for chills and fever  HENT: Negative for congestion, sinus pressure, sneezing and sore throat. Eyes: Negative for pain, discharge, redness and itching. Respiratory: Negative for apnea, cough  Gastrointestinal: Negative for blood in stool, constipation, diarrhea   Endocrine: Negative for cold intolerance, heat intolerance, polydipsia. Genitourinary: Negative for dysuria, enuresis, flank pain and hematuria. Musculoskeletal: Negative for arthralgias, joint swelling and neck pain. Neurological: Negative for numbness and headaches. Psychiatric/Behavioral: Negative for agitation, confusion, decreased concentration and dysphoric mood. Objective:     /72   Pulse 68   Ht 5' 4\" (1.626 m)   Wt 155 lb (70.3 kg)   BMI 26.61 kg/m²     Wt Readings from Last 3 Encounters:   06/05/19 155 lb (70.3 kg)   05/13/19 160 lb (72.6 kg)   05/05/19 162 lb 5 oz (73.6 kg)     BP Readings from Last 3 Encounters:   06/05/19 130/72   05/14/19 113/60   05/06/19 139/83       Nursing note and vitals reviewed. Physical Exam   Constitutional: Oriented to person, place, and time. Appears well-developed and well-nourished. HENT:   Head: Normocephalic and atraumatic. Eyes: EOM are normal. Pupils are equal, round, and reactive to light. Neck: Normal range of motion. Neck supple. No JVD present. Cardiovascular: Normal rate, regular rhythm, normal heart sounds and intact distal pulses. No murmur heard. Pulmonary/Chest: Effort normal and breath sounds normal. No respiratory distress. No wheezes. No rales. Abdominal: Soft. Bowel sounds are normal. No distension.  There is Demographics      Patient Name   Baby Boor Gender              Female                  L      MR #           [id number]       Race                                                     Ethnicity      Account #      [de-identified]       Room Number         0006      Accession      [id number]       Date of Study       11/17/2017   Number      Date of Birth  1955      Referring Physician MD Ismael Collier      Age            63 year(s)      Tamara Henderson,                                                      [facility]                                     Interpreting        Cayla Iqbal MD                                  Physician     Procedure    Type of Study      TTE procedure:ECHOCARDIOGRAM COMPLETE 2D W DOPPLER W COLOR. Procedure Date  Date: 11/17/2017 Start: 09:51 AM    Study Location: Bedside  Technical Quality: Limited visualization due to poor acoustical window. Indications:Chest pain and Murmur. Additional Medical History:Smoker, GERD, history of pulmonary embolism,  COPD, hypertension, history of alcohol and cocaine abuse    Patient Status: Routine    Height: 63.78 inches Weight: 163.16 pounds BSA: 1.79 m^2 BMI: 28.2 kg/m^2    BP: 130/77 mmHg    Allergies    - See Epic.      - No Known Allergies. Conclusions      Summary   Normal left ventricle size and systolic function. Ejection fraction was   estimated at 60%. Doppler parameters were consistent with abnormal left ventricular   relaxation (grade 1 diastolic dysfunction). Signature      ----------------------------------------------------------------   Electronically signed by Cayla Iqbal MD (Interpreting   physician) on 11/17/2017 at 06:24 PM   ----------------------------------------------------------------      Findings      Mitral Valve   The mitral valve structure was normal with normal leaflet separation.    DOPPLER: The transmitral velocity was within the normal range with no   evidence for mitral stenosis. There was trivial mitral regurgitation. Aortic Valve   The aortic valve was trileaflet with normal thickness and cuspal   separation. DOPPLER: Transaortic velocity was within the normal range with   no evidence of aortic stenosis. There was no evidence of aortic   regurgitation. Tricuspid Valve   The tricuspid valve structure was normal with normal leaflet separation. DOPPLER: There was no evidence of tricuspid stenosis. There was trivial   tricuspid regurgitation. Pulmonic Valve   The pulmonic valve was not well visualized . Left Atrium   Left atrial size was normal.      Left Ventricle   Normal left ventricle size and systolic function. Ejection fraction was   estimated at 60%. Doppler parameters were consistent with abnormal left ventricular   relaxation (grade 1 diastolic dysfunction). Right Atrium   Right atrial size was normal.      Right Ventricle   The right ventricular size was normal.      Pericardial Effusion   The pericardium was normal in appearance with no evidence of a pericardial   effusion. Pleural Effusion   No evidence of pleural effusion. Aorta / Great Vessels   Aortic root dimension within normal limits.      M-Mode/2D Measurements & Calculations      LV Diastolic    LV Systolic Dimension: 2.4  AV Cusp Separation: 1.9 cmLA   Dimension: 3.8  cm                          Dimension: 3.4 cmAO Root   cm              LV Volume Diastolic: 62 ml  Dimension: 3.1 cm   LV FS:36.8 %    LV Volume Systolic: 50.8 ml   LV PW           LV EDV/LV EDV Index: 62   Diastolic: 1.2  PI/18 L^9LD ESV/LV ESV   cm              Index: 20.2 ml/11 m^2       RV Diastolic Dimension: 3.5 cm   Septum          EF Calculated: 26.4 %   Diastolic: 1.2                              LA/Aorta: 1.1   cm     Doppler Measurements & Calculations      MV Peak E-Wave: 79 cm/s     AV Peak Velocity: 145   MV Peak A-Wave: 87.4 cm/s   cm/s   MV E/A Ratio: 0.9           AV Peak Gradient: 8.41 TV Peak E-Wave: 34.6   MV Peak Gradient: 2.5 mmHg  mmHg                   cm/s                               AV Mean Velocity: 109  TV Peak A-Wave: 36.5   MV Deceleration Time: 275   cm/s                   cm/s   msec                        AV Mean Gradient: 6                               mmHg                   TV Peak Gradient: 0.48                               AV VTI: 31.1 cm        mmHg   MV E' Septal Velocity: 5.56                        TR Velocity:200 cm/s   cm/s                                               TR Gradient:16 mmHg   MV A' Septal Velocity: 9.16                        PV Peak Velocity: 106   cm/s                        IVRT: 88 msec          cm/s   MV E' Lateral Velocity:                            PV Peak Gradient: 4.49   8.09 cm/s                                          mmHg   MV A' Lateral Velocity:   11.4 cm/s   E/E' septal: 14.21   E/E' lateral: 9.77     [email or web address] [id number]%3d        Assessment/Plan   Pre-operative CV exam  Possible upcoming Orthopedic and Gyn Surgery, unclear date (Intermediate to high risk surgery)  RCRI = 1 (Low to intermediate risk patient)  No active cardiac complaints. No cardiac issues on exams. Able to do > 4 METS. Patient accepts the risk of the planned procedure and understands the possibility of mariluz-operative cardiac event, including but not limited to MI, VT/VF, cardiac arrest, and death, and wishes to proceed with the procedure. No further cardiac testing prior to upcoming surgery.     Disposition:    6 months         Electronically signed by Jermaine De Dios MD   6/5/2019 at 3:12 PM

## 2019-06-17 ENCOUNTER — OFFICE VISIT (OUTPATIENT)
Dept: NEUROLOGY | Age: 64
End: 2019-06-17
Payer: MEDICAID

## 2019-06-17 ENCOUNTER — HOSPITAL ENCOUNTER (OUTPATIENT)
Age: 64
Discharge: HOME | End: 2019-06-17
Payer: COMMERCIAL

## 2019-06-17 VITALS
WEIGHT: 155 LBS | SYSTOLIC BLOOD PRESSURE: 126 MMHG | DIASTOLIC BLOOD PRESSURE: 72 MMHG | HEART RATE: 64 BPM | HEIGHT: 64 IN | BODY MASS INDEX: 26.46 KG/M2

## 2019-06-17 DIAGNOSIS — R29.90 STROKE-LIKE SYMPTOMS: ICD-10-CM

## 2019-06-17 DIAGNOSIS — R29.90 STROKE-LIKE SYMPTOMS: Primary | ICD-10-CM

## 2019-06-17 LAB — SEDIMENTATION RATE, ERYTHROCYTE: 5 MM/HR (ref 0–20)

## 2019-06-17 PROCEDURE — 4004F PT TOBACCO SCREEN RCVD TLK: CPT | Performed by: PSYCHIATRY & NEUROLOGY

## 2019-06-17 PROCEDURE — G8417 CALC BMI ABV UP PARAM F/U: HCPCS | Performed by: PSYCHIATRY & NEUROLOGY

## 2019-06-17 PROCEDURE — 1111F DSCHRG MED/CURRENT MED MERGE: CPT | Performed by: PSYCHIATRY & NEUROLOGY

## 2019-06-17 PROCEDURE — 3017F COLORECTAL CA SCREEN DOC REV: CPT | Performed by: PSYCHIATRY & NEUROLOGY

## 2019-06-17 PROCEDURE — G8598 ASA/ANTIPLAT THER USED: HCPCS | Performed by: PSYCHIATRY & NEUROLOGY

## 2019-06-17 PROCEDURE — 99213 OFFICE O/P EST LOW 20 MIN: CPT | Performed by: PSYCHIATRY & NEUROLOGY

## 2019-06-17 PROCEDURE — G8427 DOCREV CUR MEDS BY ELIG CLIN: HCPCS | Performed by: PSYCHIATRY & NEUROLOGY

## 2019-06-17 PROCEDURE — 36415 COLL VENOUS BLD VENIPUNCTURE: CPT

## 2019-06-17 PROCEDURE — 85651 RBC SED RATE NONAUTOMATED: CPT

## 2019-06-17 NOTE — PATIENT INSTRUCTIONS
1. Sedrate. 2. Continue with antiplatelets. 3.  Follow up in 3 months or sooner if needed. 4. Call if any questions or concerns.

## 2019-06-17 NOTE — PROGRESS NOTES
Rfl:     clopidogrel (PLAVIX) 75 MG tablet, TAKE 1 TABLET BY MOUTH DAILY, Disp: 28 tablet, Rfl: 3    nystatin-triamcinolone (MYCOLOG II) 812950-7.1 UNIT/GM-% cream, Apply topically 4 times daily. , Disp: 2 Tube, Rfl: 1    hydrOXYzine (VISTARIL) 50 MG capsule, Take 50 mg by mouth 3 times daily as needed for Itching or Anxiety , Disp: , Rfl:     traZODone (DESYREL) 100 MG tablet, Take 2 tablets by mouth nightly, Disp: 30 tablet, Rfl: 0    QUEtiapine (SEROQUEL XR) 300 MG extended release tablet, Take 2 tablets by mouth nightly (Patient taking differently: Take 600 mg by mouth nightly ), Disp: 30 tablet, Rfl: 0    Albuterol Sulfate (VENTOLIN HFA IN), Inhale 90 mg into the lungs 2 times daily as needed (2 puffs) , Disp: , Rfl:     isosorbide dinitrate (ISORDIL) 10 MG tablet, TAKE 1 TABLET BY MOUTH 3 TIMES DAILY, Disp: 270 tablet, Rfl: 3    carvedilol (COREG) 3.125 MG tablet, TAKE 1 TABLET BY MOUTH 2 TIMES DAILY (WITH MEALS), Disp: 180 tablet, Rfl: 3    TUDORZA PRESSAIR 400 MCG/ACT AEPB inhaler, 1 puff 2 times daily as needed (wheezing/SOB) , Disp: , Rfl: 0    levothyroxine (SYNTHROID) 75 MCG tablet, Take 75 mcg by mouth Six times weekly everyday except Sunday take 150 mcg., Disp: , Rfl:     pantoprazole (PROTONIX) 40 MG tablet, Take 1 tablet by mouth daily Indications: Gastroesophageal Reflux Disease, Disp: 10 tablet, Rfl: 0    albuterol (PROVENTIL HFA;VENTOLIN HFA) 108 (90 BASE) MCG/ACT inhaler, Inhale 2 puffs into the lungs every 6 hours as needed for Wheezing , Disp: , Rfl:     benzonatate (TESSALON PERLES) 100 MG capsule, Take 100 mg by mouth 3 times daily, Disp: , Rfl:       PE:   Vitals:    06/17/19 1413   BP: 126/72   Site: Left Upper Arm   Position: Sitting   Cuff Size: Medium Adult   Pulse: 64   Weight: 155 lb (70.3 kg)   Height: 5' 4\" (1.626 m)     General Appearance:  alert and cooperative  Skin:  Skin color, texture, turgor normal. No rashes or lesions. Gen: NAD, Language is Intact.   Head: no rash, no icterus  Neck: There is no carotid bruits. The Neck is supple. Neuro: CN 2-12 grossly intact with no focal deficits. Power 5/5 Throughout symmetric, Reflexes are decreased and symmetric. Long tracts are intact. Cerebellar exam is Intact. Sensory exam is intact to light touch. Gait is intact. Musculoskeletal:  Has no hand arthritis, no limitation of ROM in any of the four extremities.   Lower extremities no edema        DATA:  Results for orders placed or performed during the hospital encounter of 05/13/19   CBC auto differential   Result Value Ref Range    WBC 6.9 4.8 - 10.8 thou/mm3    RBC 4.83 4.20 - 5.40 mill/mm3    Hemoglobin 11.6 (L) 12.0 - 16.0 gm/dl    Hematocrit 36.9 (L) 37.0 - 47.0 %    MCV 76.4 (L) 81.0 - 99.0 fL    MCH 24.0 (L) 26.0 - 33.0 pg    MCHC 31.4 (L) 32.2 - 35.5 gm/dl    RDW-CV 14.0 11.5 - 14.5 %    RDW-SD 38.1 35.0 - 45.0 fL    Platelets 275 742 - 891 thou/mm3    MPV 8.5 (L) 9.4 - 12.4 fL    Seg Neutrophils 42.2 %    Lymphocytes 47.5 %    Monocytes 8.2 %    Eosinophils 1.0 %    Basophils 0.7 %    Immature Granulocytes 0.4 %    Atypical Lymphocytes FEW %    Segs Absolute 2.9 1.8 - 7.7 thou/mm3    Lymphocytes # 3.3 1.0 - 4.8 thou/mm3    Monocytes # 0.6 0.4 - 1.3 thou/mm3    Eosinophils # 0.1 0.0 - 0.4 thou/mm3    Basophils # 0.0 0.0 - 0.1 thou/mm3    Immature Grans (Abs) 0.03 0.00 - 0.07 thou/mm3    nRBC 0 /100 wbc    BASOPHILIA 1+ Absent    Poikilocytes 1+ Absent   Comprehensive Metabolic Panel   Result Value Ref Range    Glucose 114 (H) 70 - 108 mg/dL    CREATININE 0.9 0.4 - 1.2 mg/dL    BUN 14 7 - 22 mg/dL    Sodium 136 135 - 145 meq/L    Potassium 3.9 3.5 - 5.2 meq/L    Chloride 93 (L) 98 - 111 meq/L    CO2 29 23 - 33 meq/L    Calcium 10.8 (H) 8.5 - 10.5 mg/dL    AST 27 5 - 40 U/L    Alkaline Phosphatase 59 38 - 126 U/L    Total Protein 7.4 6.1 - 8.0 g/dL    Alb 4.0 3.5 - 5.1 g/dL    Total Bilirubin 0.2 (L) 0.3 - 1.2 mg/dL    ALT 26 11 - 66 U/L   Troponin   Result Value Ref Range the       C5-6 level. Mild central canal stenosis results. Right more than left       mild foraminal stenosis also. There are mild facet degenerative changes       bilaterally. C6-7 canal is grossly patent. Degenerative endplate changes are evident,       and there is right more than left uncovertebral osteophyte formation. Minimal right foraminal stenosis, less impressive canal and left foraminal       stenosis. Very mild facet degenerative changes bilaterally. C7-T1 canal is widely patent. There are moderate facet degenerative       changes bilaterally. The foramina are grossly patent. There is very       minimal desiccation of the disc material and no significant disc protrusion       or bulging. Surrounding tissues show no concerning findings. IMPRESSION:  Degenerative disc and facet changes evident producing mild       stenosis of the canal and foramina. However, areas of stenosis are       essentially confined to the canal and foramina at C4-5 and C5-6 with only       mild stenosis resulting as discussed. No evidence of acute abnormality or       myelopathic change. **FINAL REPORT**       Dictated By :  Rhiannon Metcalf M.D. Certified Electronic Signature       [address] [place] [address].       Dictated Date and Time:  17-JUL-2014 14:42       Transcribed By:  RONI       Transcribed Date and Time:  18-JUL-2014 22:28       Approved By:  Rhiannon Metcalf M.D. Approve Date and Time:  19-JUL-2014 14:52         Technologist:  Amalia HU  RT(R)(MR)           Page 2 of 2              Print date/time:7/19/2014 2:53 PM     Results for orders placed during the hospital encounter of 05/05/19   MRA Head WO Contrast    Narrative PROCEDURE: MRA HEAD WO CONTRAST    CLINICAL INFORMATION: tingling right arm, slurred speech. Slurred speech and tingling in the right arm since yesterday. COMPARISON: Brain MRI 5/6/2019.     TECHNIQUE: 3-D indicate explanation for epistaxis. Incidentally, there are prominent calibers of the nasal turbinates with a   deviated nasal septum, inferiorly deviated left to right, severely deviated right than left, possibly related to the stated complaints. The patient is edentulous with secondary volume loss of the maxillary and mandibular alveolus implied, incompletely   imaged. Mastoid air cells and middle ear cavities are clear. Impression NO EVIDENCE OF ACUTE ARTERIAL ABNORMALITY. TO THE LIMITS OF CTA OF THE BRAIN, NO STRUCTURAL ABNORMALITY TO INDICATE EXPLANATION FOR EPISTAXIS. EVIDENCE OF INTRACRANIAL ARTHROSCLEROSIS INCLUDING STENOSIS, POORLY DEFINED, IN THE LEFT POSTERIOR   CEREBRAL ARTERY P2 SEGMENT, WHICH COULD BE RELATED TO THE COMPLAINTS OF VISION DISTURBANCE AND HEADACHE. CLINICAL CORRELATION NECESSARY AS THIS IS A NONSPECIFIC FINDING. **This report has been created using voice recognition software. It may contain minor errors which are inherent in voice recognition technology. **        Final report electronically signed by Dr. Asad Anglin on 12/17/2017 10:14 PM      I reviewed the MRI  brain and agree with interpretation. Results for orders placed during the hospital encounter of 05/05/19   MRI brain without contrast    Narrative PROCEDURE: MRI BRAIN WO CONTRAST    CLINICAL INFORMATION STROKE. Some slurred speech and right arm tingling since yesterday. COMPARISON: Head CT 5/5/2019. TECHNIQUE: Multiplanar and multiple spin echo MRI images were obtained of the brain without contrast.    FINDINGS:        The diffusion-weighted images are normal.  The brain volume is at the lower limits of normal. There is symmetric mild high signal along the corticospinal tracts bilaterally. This is seen on the FLAIR and T2-weighted images. There are no other areas of   abnormal signal.    There are no intra-or extra-axial collections.   There is no hydrocephalus, midline shift or mass

## 2019-07-01 ENCOUNTER — TELEPHONE (OUTPATIENT)
Dept: AUDIOLOGY | Age: 64
End: 2019-07-01

## 2019-07-01 NOTE — TELEPHONE ENCOUNTER
Please call patient to schedule a hearing aid fitting appointment. Her hearing aids are here. Please register with [facility]. No prior authorization is needed.

## 2019-07-10 ENCOUNTER — HOSPITAL ENCOUNTER (OUTPATIENT)
Dept: AUDIOLOGY | Age: 64
Discharge: HOME | End: 2019-07-10
Payer: MEDICAID

## 2019-07-10 PROCEDURE — V5257 HEARING AID, DIGIT, MON, BTE: HCPCS | Performed by: AUDIOLOGIST

## 2019-07-10 PROCEDURE — V5261 HEARING AID, DIGIT, BIN, BTE: HCPCS | Performed by: AUDIOLOGIST

## 2019-07-10 PROCEDURE — V5160 DISPENSING FEE BINAURAL: HCPCS | Performed by: AUDIOLOGIST

## 2019-07-10 NOTE — PROGRESS NOTES
[facility]#: [id number]   ACCT#: [de-identified]    DIAGNOSIS: H90.3    NEW HEARING AID FITTING: Dispensed Phonak RallyOn B-30P BTE hearing aid(s) for the right and left ear(s). Explained care, use and insertion. Programmed. Hearing aid fitting  recheck scheduled for 07/25/2019.

## 2019-07-22 ENCOUNTER — APPOINTMENT (OUTPATIENT)
Dept: GENERAL RADIOLOGY | Age: 64
End: 2019-07-22
Payer: MEDICAID

## 2019-07-22 ENCOUNTER — APPOINTMENT (OUTPATIENT)
Dept: CT IMAGING | Age: 64
End: 2019-07-22
Payer: MEDICAID

## 2019-07-22 ENCOUNTER — HOSPITAL ENCOUNTER (EMERGENCY)
Age: 64
Discharge: HOME | End: 2019-07-22
Attending: EMERGENCY MEDICINE
Payer: MEDICAID

## 2019-07-22 VITALS
DIASTOLIC BLOOD PRESSURE: 75 MMHG | SYSTOLIC BLOOD PRESSURE: 128 MMHG | OXYGEN SATURATION: 97 % | BODY MASS INDEX: 28.34 KG/M2 | HEART RATE: 70 BPM | WEIGHT: 166 LBS | HEIGHT: 64 IN | RESPIRATION RATE: 20 BRPM | TEMPERATURE: 98 F

## 2019-07-22 DIAGNOSIS — R07.89 CHEST WALL PAIN: Primary | ICD-10-CM

## 2019-07-22 DIAGNOSIS — R10.9 ABDOMINAL PAIN IN FEMALE: ICD-10-CM

## 2019-07-22 LAB
ALBUMIN SERPL-MCNC: 4.6 G/DL (ref 3.5–5.1)
ALP BLD-CCNC: 42 U/L (ref 38–126)
ALT SERPL-CCNC: 30 U/L (ref 11–66)
ANION GAP SERPL CALCULATED.3IONS-SCNC: 15 MEQ/L (ref 8–16)
APTT: 35.1 SECONDS (ref 22–38)
AST SERPL-CCNC: 31 U/L (ref 5–40)
BASOPHILS # BLD: 0.5 %
BASOPHILS ABSOLUTE: 0 THOU/MM3 (ref 0–0.1)
BILIRUB SERPL-MCNC: 0.4 MG/DL (ref 0.3–1.2)
BUN BLDV-MCNC: 13 MG/DL (ref 7–22)
CALCIUM SERPL-MCNC: 10.7 MG/DL (ref 8.5–10.5)
CHLORIDE BLD-SCNC: 94 MEQ/L (ref 98–111)
CO2: 28 MEQ/L (ref 23–33)
CREAT SERPL-MCNC: 0.7 MG/DL (ref 0.4–1.2)
EKG ATRIAL RATE: 66 BPM
EKG P AXIS: 63 DEGREES
EKG P-R INTERVAL: 150 MS
EKG Q-T INTERVAL: 466 MS
EKG QRS DURATION: 88 MS
EKG QTC CALCULATION (BAZETT): 488 MS
EKG R AXIS: 35 DEGREES
EKG T AXIS: 21 DEGREES
EKG VENTRICULAR RATE: 66 BPM
EOSINOPHIL # BLD: 1.2 %
EOSINOPHILS ABSOLUTE: 0.1 THOU/MM3 (ref 0–0.4)
ERYTHROCYTE [DISTWIDTH] IN BLOOD BY AUTOMATED COUNT: 15.7 % (ref 11.5–14.5)
ERYTHROCYTE [DISTWIDTH] IN BLOOD BY AUTOMATED COUNT: 45.8 FL (ref 35–45)
GFR SERPL CREATININE-BSD FRML MDRD: 85 ML/MIN/1.73M2
GLUCOSE BLD-MCNC: 135 MG/DL (ref 70–108)
HCT VFR BLD CALC: 42.5 % (ref 37–47)
HEMOGLOBIN: 13.6 GM/DL (ref 12–16)
IMMATURE GRANS (ABS): 0.04 THOU/MM3 (ref 0–0.07)
IMMATURE GRANULOCYTES: 0.5 %
INR BLD: 1.09 (ref 0.85–1.13)
LIPASE: 32.6 U/L (ref 5.6–51.3)
LYMPHOCYTES # BLD: 41.5 %
LYMPHOCYTES ABSOLUTE: 3.1 THOU/MM3 (ref 1–4.8)
MCH RBC QN AUTO: 25.9 PG (ref 26–33)
MCHC RBC AUTO-ENTMCNC: 32 GM/DL (ref 32.2–35.5)
MCV RBC AUTO: 80.8 FL (ref 81–99)
MONOCYTES # BLD: 6.9 %
MONOCYTES ABSOLUTE: 0.5 THOU/MM3 (ref 0.4–1.3)
NUCLEATED RED BLOOD CELLS: 0 /100 WBC
OSMOLALITY CALCULATION: 276 MOSMOL/KG (ref 275–300)
PLATELET # BLD: 258 THOU/MM3 (ref 130–400)
PMV BLD AUTO: 9.3 FL (ref 9.4–12.4)
POTASSIUM SERPL-SCNC: 3.9 MEQ/L (ref 3.5–5.2)
PRO-BNP: 81.7 PG/ML (ref 0–900)
RBC # BLD: 5.26 MILL/MM3 (ref 4.2–5.4)
SEG NEUTROPHILS: 49.4 %
SEGMENTED NEUTROPHILS ABSOLUTE COUNT: 3.7 THOU/MM3 (ref 1.8–7.7)
SODIUM BLD-SCNC: 137 MEQ/L (ref 135–145)
TOTAL PROTEIN: 6.9 G/DL (ref 6.1–8)
TROPONIN T: < 0.01 NG/ML
TROPONIN T: < 0.01 NG/ML
WBC # BLD: 7.4 THOU/MM3 (ref 4.8–10.8)

## 2019-07-22 PROCEDURE — 6360000004 HC RX CONTRAST MEDICATION: Performed by: EMERGENCY MEDICINE

## 2019-07-22 PROCEDURE — 83690 ASSAY OF LIPASE: CPT

## 2019-07-22 PROCEDURE — 80053 COMPREHEN METABOLIC PANEL: CPT

## 2019-07-22 PROCEDURE — 93005 ELECTROCARDIOGRAM TRACING: CPT | Performed by: EMERGENCY MEDICINE

## 2019-07-22 PROCEDURE — 36415 COLL VENOUS BLD VENIPUNCTURE: CPT

## 2019-07-22 PROCEDURE — 85730 THROMBOPLASTIN TIME PARTIAL: CPT

## 2019-07-22 PROCEDURE — 85025 COMPLETE CBC W/AUTO DIFF WBC: CPT

## 2019-07-22 PROCEDURE — 83880 ASSAY OF NATRIURETIC PEPTIDE: CPT

## 2019-07-22 PROCEDURE — 85610 PROTHROMBIN TIME: CPT

## 2019-07-22 PROCEDURE — 84484 ASSAY OF TROPONIN QUANT: CPT

## 2019-07-22 PROCEDURE — 74177 CT ABD & PELVIS W/CONTRAST: CPT

## 2019-07-22 PROCEDURE — 71046 X-RAY EXAM CHEST 2 VIEWS: CPT

## 2019-07-22 PROCEDURE — 99285 EMERGENCY DEPT VISIT HI MDM: CPT

## 2019-07-22 RX ADMIN — IOPAMIDOL 80 ML: 755 INJECTION, SOLUTION INTRAVENOUS at 09:11

## 2019-07-22 ASSESSMENT — ENCOUNTER SYMPTOMS
ABDOMINAL DISTENTION: 0
NAUSEA: 1
SHORTNESS OF BREATH: 0
DIARRHEA: 1
SINUS PRESSURE: 0
BLOOD IN STOOL: 0
SINUS PAIN: 0
CHOKING: 0
VOMITING: 0
COUGH: 1
CONSTIPATION: 0
SHORTNESS OF BREATH: 1
CHEST TIGHTNESS: 0
BACK PAIN: 0
RECTAL PAIN: 0
EYES NEGATIVE: 1
APNEA: 0
CHEST TIGHTNESS: 1
STRIDOR: 0
ABDOMINAL PAIN: 1
EYE PAIN: 0
COUGH: 0
WHEEZING: 0
ANAL BLEEDING: 0

## 2019-07-22 ASSESSMENT — PAIN DESCRIPTION - PAIN TYPE
TYPE: ACUTE PAIN
TYPE: ACUTE PAIN

## 2019-07-22 ASSESSMENT — PAIN DESCRIPTION - LOCATION
LOCATION: CHEST;ABDOMEN;BACK
LOCATION: ABDOMEN;CHEST

## 2019-07-22 ASSESSMENT — PAIN DESCRIPTION - DESCRIPTORS: DESCRIPTORS: SHARP

## 2019-07-22 ASSESSMENT — PAIN SCALES - GENERAL
PAINLEVEL_OUTOF10: 9
PAINLEVEL_OUTOF10: 8

## 2019-07-22 ASSESSMENT — PAIN DESCRIPTION - FREQUENCY
FREQUENCY: CONTINUOUS
FREQUENCY: CONTINUOUS

## 2019-07-22 ASSESSMENT — PAIN DESCRIPTION - PROGRESSION: CLINICAL_PROGRESSION: NOT CHANGED

## 2019-07-22 ASSESSMENT — PAIN DESCRIPTION - ONSET: ONSET: ON-GOING

## 2019-07-22 ASSESSMENT — PAIN DESCRIPTION - ORIENTATION
ORIENTATION: LEFT
ORIENTATION: LEFT

## 2019-07-22 NOTE — ED PROVIDER NOTES
Chief Complaint   Patient presents with    Chest Pain     History obtained from the patient, medical student presentation and chart review. Casey Haney is a 60 y.o. female, presents to the ED from [facility] assisted living via EMS who c/o left sided chest pain onset this morning. The patient rates her pain as an 8/10 in severity and describes it as pressure in character. She states her pain is constant. She reports shortness of breath. Patient states for the past 2 days she has had diarrhea, nausea, chills and left sided abdominal pain. Patient is on Plavix. The patient has a past medical history of MI, stroke x3, COPD, diverticulitis, GERD, HLD, HTN and PE. No further complaints at the time of the initial encounter. Review of Systems   Constitutional: Positive for chills. Negative for fever and unexpected weight change. HENT: Negative for congestion, ear pain, nosebleeds, sinus pressure and sinus pain. Eyes: Negative for pain. Respiratory: Negative for cough, chest tightness and shortness of breath. Cardiovascular: Positive for chest pain. Gastrointestinal: Positive for abdominal pain, diarrhea and nausea. Negative for blood in stool, constipation, rectal pain and vomiting. Endocrine: Negative for polydipsia and polyuria. Genitourinary: Negative for dysuria and flank pain. Musculoskeletal: Negative for arthralgias, back pain, myalgias and neck pain. Skin: Negative for rash. Neurological: Negative for tremors, seizures, weakness and headaches. All other systems reviewed and are negative.           Past Medical History:   Diagnosis Date    Allergic rhinitis     Arthritis     spine    B[facility] disorder ([facility]     Blood circulation, collateral     Cancer ([address] 2011    cancerous polyps removed - Dr. Di Nunez Colon polyps     COPD (chronic obstructive pulmonary disease) ([facility] [address] 7/24/2014    Depression     Diverticulitis of colon     GERD (gastroesophageal reflux (PROTONIX) 40 MG tablet, Take 1 tablet by mouth daily Indications: Gastroesophageal Reflux Disease, Disp: 10 tablet, Rfl: 0    albuterol (PROVENTIL HFA;VENTOLIN HFA) 108 (90 BASE) MCG/ACT inhaler, Inhale 2 puffs into the lungs every 6 hours as needed for Wheezing , Disp: , Rfl:       Social History     Social History Narrative    Not on file     Social History     Tobacco Use    Smoking status: Current Every Day Smoker     Packs/day: 0.50     Years: 30.00     Pack years: 15.00     Types: Cigarettes     Start date: 4/22/1977    Smokeless tobacco: Never Used   Substance Use Topics    Alcohol use: No     Comment: none for 2 years    Drug use: Yes     Frequency: 1.0 times per week     Types: Cocaine     Comment: smokes cocaine daily - per patient as of 5/14/2019     Allergies   Allergen Reactions    Seasonal Other (See Comments)     sneezing           Family History   Problem Relation Age of Onset    Cancer Mother     Heart Disease Mother     High Blood Pressure Mother     High Cholesterol Mother     Cancer Father         brain    Depression Father     Heart Disease Father     High Cholesterol Father     Mental Illness Father     Cancer Sister     Heart Attack Sister     Heart Disease Maternal Uncle     High Cholesterol Maternal Uncle     Diabetes Maternal Grandmother     Heart Disease Maternal Grandmother     High Cholesterol Maternal Grandmother     Early Death Maternal Grandfather     Heart Disease Maternal Grandfather     High Cholesterol Maternal Grandfather     Stroke Maternal Grandfather     Heart Disease Paternal Grandmother     High Cholesterol Paternal Grandmother     Heart Disease Paternal Grandfather     High Cholesterol Paternal Grandfather            Previous records reviewed: The patient was in the ED on 05/13/19 for COPD exacerbation.  .      Vitals:    07/22/19 1059   BP: 128/75   Pulse: 70   Resp: 20   Temp:    SpO2: 97%     Vital signs and nursing assessment reviewed and

## 2019-07-22 NOTE — ED TRIAGE NOTES
Pt presents to ED via EMS from [facility] with c/o chest pain/SOB. Per report from [facility] after pt woke up she had c/o chest pain, left arm and RUQ pain. Per EMS pt was given Nitro x2 with little relief. See ambulance run sheet. Upon arrival pt sitting up on cot, respirations easy and unlabored. Pt is alert and oriented x4. Pt rates chest pain 8/10 at this time. EKG completed.

## 2019-07-22 NOTE — ED NOTES
bleeding, blood in stool, constipation, rectal pain and vomiting. Genitourinary: Negative. Musculoskeletal: Negative. Skin: Negative. Neurological: Negative. Past Medical History:   Diagnosis Date    Allergic rhinitis     Arthritis     spine    [facility] [address]     Blood circulation, collateral     Cancer ([facility] [address] 2011    cancerous polyps removed - Dr. Pb Gonzalez Colon polyps     COPD (chronic obstructive pulmonary disease) ([facility] [address] 7/24/2014    Depression     Diverticulitis of colon     GERD (gastroesophageal reflux disease)     Hiatal hernia     History of colonoscopy 2002    History of kidney stones     Hx of blood clots 6/17/2014    PE and collar bone area after shoulder surgery    Hyperlipidemia     Hypertension     Liver disease     enlarged liver - damaged with alcohol in past but no cirrhosis per patient    Pneumonia 7/24/2014    Suicidal thoughts     2015 admitted to  from [facility]    Thyroid disease     Vomiting     Wears dentures     Wears glasses          Past Surgical History:   Procedure Laterality Date    ABDOMEN SURGERY      x4 removed cysts and ovary removed    CARDIAC SURGERY      heart caths, no stents    CARPAL TUNNEL RELEASE Bilateral 2016    CHOLECYSTECTOMY, LAPAROSCOPIC  6/12/15    Dr. Ronda Hudson, ESOPHAGUS      ELBOW SURGERY Right 10/7/2004    Dr. Jordin Esqueda Bilateral 2016    decompression   Stefani Bun     [employer] -[address] [place] [address] with [address]    ROTATOR CUFF REPAIR  2004, 2014    right shoulder    SHOULDER SURGERY  2014    right    SKIN BIOPSY  2006    under left eye           No current facility-administered medications for this encounter.      Current Outpatient Medications:     hydrochlorothiazide (HYDRODIURIL) 25 MG tablet, TAKE 1 TABLET BY MOUTH TWICE A DAY, Disp: 60 tablet, Rfl: 0    escitalopram (LEXAPRO) 20 MG tablet, Take 20 mg by mouth daily, Disp: , Rfl:     escitalopram (LEXAPRO) 10 MG tablet, Take 10 mg by mouth daily, Disp: , Rfl:     ranitidine (ZANTAC) 150 MG capsule, Take 150 mg by mouth daily, Disp: , Rfl:     fenofibrate 160 MG tablet, Take 160 mg by mouth daily, Disp: , Rfl:     mirtazapine (REMERON) 30 MG tablet, Take 1 tablet by mouth nightly, Disp: 30 tablet, Rfl: 0    ARIPiprazole (ABILIFY) 5 MG tablet, Take 1 tablet by mouth daily, Disp: 30 tablet, Rfl: 0    Cyanocobalamin (VITAMIN B-12) 3000 MCG SUBL, Place 1 tablet under the tongue Daily, Disp: 30 tablet, Rfl: 2    folic acid (FOLVITE) 1 MG tablet, Take 1 tablet by mouth daily, Disp: 30 tablet, Rfl: 3    aspirin 81 MG chewable tablet, Take 1 tablet by mouth daily, Disp: 30 tablet, Rfl: 3    nabumetone (RELAFEN) 500 MG tablet, Take 1 tablet by mouth 2 times daily, Disp: 30 tablet, Rfl: 0    alosetron (LOTRONEX) 0.5 MG tablet, Take 0.5 mg by mouth 2 times daily, Disp: , Rfl:     lipase-protease-amylase (ZENPEP) 5000 units delayed release capsule, Take 2 capsules by mouth 3 times daily (with meals) And 1 capsule with snacks, Disp: , Rfl:     benzonatate (TESSALON PERLES) 100 MG capsule, Take 100 mg by mouth 3 times daily, Disp: , Rfl:     nystatin (MYCOSTATIN) POWD powder, Apply 1 each topically 2 times daily, Disp: , Rfl:     clopidogrel (PLAVIX) 75 MG tablet, TAKE 1 TABLET BY MOUTH DAILY, Disp: 28 tablet, Rfl: 3    nystatin-triamcinolone (MYCOLOG II) 367130-7.1 UNIT/GM-% cream, Apply topically 4 times daily. , Disp: 2 Tube, Rfl: 1    hydrOXYzine (VISTARIL) 50 MG capsule, Take 50 mg by mouth 3 times daily as needed for Itching or Anxiety , Disp: , Rfl:     traZODone (DESYREL) 100 MG tablet, Take 2 tablets by mouth nightly, Disp: 30 tablet, Rfl: 0    QUEtiapine (SEROQUEL XR) 300 MG extended release tablet, Take 2 tablets by mouth nightly (Patient taking differently: Take 600 mg by mouth nightly ), Disp: 30 tablet, Rfl: 0    Albuterol Sulfate (VENTOLIN HFA IN), Inhale 90 mg into

## 2019-07-23 PROCEDURE — 93010 ELECTROCARDIOGRAM REPORT: CPT | Performed by: INTERNAL MEDICINE

## 2019-07-25 ENCOUNTER — HOSPITAL ENCOUNTER (OUTPATIENT)
Dept: AUDIOLOGY | Age: 64
Discharge: HOME | End: 2019-07-25
Payer: MEDICAID

## 2019-07-25 PROCEDURE — V5266 BATTERY FOR HEARING DEVICE: HCPCS | Performed by: AUDIOLOGIST

## 2019-07-31 LAB
BILIRUBIN URINE: ABNORMAL
BLOOD, URINE: ABNORMAL
CLARITY: ABNORMAL
COLOR: YELLOW
GLUCOSE URINE: NEGATIVE
KETONES, URINE: NEGATIVE
LEUKOCYTE ESTERASE, URINE: ABNORMAL
NITRITE, URINE: NEGATIVE
PH UA: 6 (ref 4.5–8)
PROTEIN UA: ABNORMAL
SPECIFIC GRAVITY UA: 1.02 (ref 1–1.03)
UROBILINOGEN, URINE: NORMAL

## 2019-08-08 ENCOUNTER — OFFICE VISIT (OUTPATIENT)
Dept: PULMONOLOGY | Age: 64
End: 2019-08-08
Payer: MEDICAID

## 2019-08-08 ENCOUNTER — TELEPHONE (OUTPATIENT)
Dept: PULMONOLOGY | Age: 64
End: 2019-08-08

## 2019-08-08 VITALS
HEART RATE: 67 BPM | BODY MASS INDEX: 28.85 KG/M2 | OXYGEN SATURATION: 95 % | HEIGHT: 64 IN | WEIGHT: 169 LBS | DIASTOLIC BLOOD PRESSURE: 78 MMHG | SYSTOLIC BLOOD PRESSURE: 133 MMHG

## 2019-08-08 DIAGNOSIS — J90 PLEURAL EFFUSION: ICD-10-CM

## 2019-08-08 DIAGNOSIS — J44.9 STAGE 1 MILD COPD BY GOLD CLASSIFICATION (HCC): ICD-10-CM

## 2019-08-08 DIAGNOSIS — F17.200 SMOKER UNMOTIVATED TO QUIT: ICD-10-CM

## 2019-08-08 DIAGNOSIS — I50.32 CHRONIC HEART FAILURE WITH PRESERVED EJECTION FRACTION (HCC): Primary | ICD-10-CM

## 2019-08-08 PROCEDURE — 99204 OFFICE O/P NEW MOD 45 MIN: CPT | Performed by: INTERNAL MEDICINE

## 2019-08-08 RX ORDER — ACLIDINIUM BROMIDE 400 UG/1
1 POWDER, METERED RESPIRATORY (INHALATION) 2 TIMES DAILY
Qty: 1 EACH | Refills: 5 | Status: SHIPPED | OUTPATIENT
Start: 2019-08-08 | End: 2020-02-10

## 2019-08-08 RX ORDER — ALBUTEROL SULFATE 90 UG/1
2 AEROSOL, METERED RESPIRATORY (INHALATION) EVERY 6 HOURS PRN
Qty: 1 INHALER | Refills: 5 | Status: ON HOLD | OUTPATIENT
Start: 2019-08-08 | End: 2019-12-26 | Stop reason: HOSPADM

## 2019-08-08 RX ORDER — NICOTINE 21 MG/24HR
1 PATCH, TRANSDERMAL 24 HOURS TRANSDERMAL DAILY
Qty: 42 PATCH | Refills: 3 | Status: SHIPPED | OUTPATIENT
Start: 2019-08-08 | End: 2019-12-11

## 2019-08-08 ASSESSMENT — ENCOUNTER SYMPTOMS
CONSTIPATION: 0
ABDOMINAL PAIN: 0
TROUBLE SWALLOWING: 1
CHOKING: 0
BLOOD IN STOOL: 0
COUGH: 1
STRIDOR: 0
ABDOMINAL DISTENTION: 0
EYES NEGATIVE: 1
APNEA: 0
CHEST TIGHTNESS: 1
SHORTNESS OF BREATH: 1
VOICE CHANGE: 1
WHEEZING: 1

## 2019-08-08 NOTE — PROGRESS NOTES
rales. She exhibits no tenderness. Abdominal: Soft. Bowel sounds are normal.   Musculoskeletal: Normal range of motion. She exhibits no edema. Neurological: She is alert and oriented to person, place, and time. Skin: Skin is warm and dry. Nursing note and vitals reviewed.          All other systems reviewed and are negative    Lung Nodule Screening     [] Qualifies    [x] Does not qualify   [] Declined   [] Completed  Past Medical History:   Diagnosis Date    Allergic rhinitis     Arthritis     spine    [facility] disorder ([facility] [address]     Blood circulation, collateral     Cancer ([facility] [address] 2011    cancerous polyps removed - Dr. Kevon Bailey Colon polyps     COPD (chronic obstructive pulmonary disease) ([facility] [address] 7/24/2014    Depression     Diverticulitis of colon     GERD (gastroesophageal reflux disease)     Hiatal hernia     History of colonoscopy 2002    History of kidney stones     Hx of blood clots 6/17/2014    PE and collar bone area after shoulder surgery    Hyperlipidemia     Hypertension     Liver disease     enlarged liver - damaged with alcohol in past but no cirrhosis per patient    Pneumonia 7/24/2014    Suicidal thoughts     2015 [facility]    Thyroid disease     Vomiting     Wears dentures     Wears glasses      Past Surgical History:   Procedure Laterality Date    ABDOMEN SURGERY      x4 removed cysts and ovary removed    CARDIAC SURGERY      heart caths, no stents    CARPAL TUNNEL RELEASE Bilateral 2016    CHOLECYSTECTOMY, LAPAROSCOPIC  6/12/15    Dr. Iraj Martin, ESOPHAGUS      ELBOW SURGERY Right 10/7/2004    Dr. Grace Salmeron Bilateral 2016    decompression   [address]    Dr. Enoc Duran [facility] with [address] [facility] [address] REPAIR  2004, 2014    right shoulder    SHOULDER SURGERY  2014    right    SKIN BIOPSY  2006    under left eye     Social History     Tobacco Use    Smoking lungs 2 times daily     Dispense:  1 each     Refill:  5    albuterol sulfate  (90 Base) MCG/ACT inhaler     Sig: Inhale 2 puffs into the lungs every 6 hours as needed for Wheezing     Dispense:  1 Inhaler     Refill:  5    nicotine (NICODERM CQ) 21 MG/24HR     Sig: Place 1 patch onto the skin daily     Dispense:  42 patch     Refill:  3             Patient was counseled on tobacco cessation. Based upon patient's motivation to change her behavior, the following plan was agreed upon: patient will try the following tobacco cessation strategies:  nicotine replacement: nicotine patches, risks and benefits of this medication discussed- patient will call for any significant adverse effects. She was provided with a list of local tobacco cessation resources. Provider spent 10 minutes counseling patient.    RTC 1 year

## 2019-09-04 ENCOUNTER — HOSPITAL ENCOUNTER (OUTPATIENT)
Dept: NON INVASIVE DIAGNOSTICS | Age: 64
Discharge: HOME | End: 2019-09-04
Payer: MEDICAID

## 2019-09-04 VITALS — HEIGHT: 64 IN | BODY MASS INDEX: 29.02 KG/M2 | WEIGHT: 170 LBS

## 2019-09-04 PROCEDURE — 93018 CV STRESS TEST I&R ONLY: CPT | Performed by: INTERNAL MEDICINE

## 2019-09-04 PROCEDURE — 93016 CV STRESS TEST SUPVJ ONLY: CPT | Performed by: INTERNAL MEDICINE

## 2019-09-04 PROCEDURE — 2709999900 HC NON-CHARGEABLE SUPPLY

## 2019-09-04 PROCEDURE — 78452 HT MUSCLE IMAGE SPECT MULT: CPT

## 2019-09-04 PROCEDURE — A9500 TC99M SESTAMIBI: HCPCS | Performed by: INTERNAL MEDICINE

## 2019-09-04 PROCEDURE — 93017 CV STRESS TEST TRACING ONLY: CPT | Performed by: INTERNAL MEDICINE

## 2019-09-04 PROCEDURE — 6360000002 HC RX W HCPCS

## 2019-09-04 PROCEDURE — 3430000000 HC RX DIAGNOSTIC RADIOPHARMACEUTICAL: Performed by: INTERNAL MEDICINE

## 2019-09-04 PROCEDURE — 78452 HT MUSCLE IMAGE SPECT MULT: CPT | Performed by: INTERNAL MEDICINE

## 2019-09-04 RX ADMIN — Medication 30.2 MILLICURIE: at 10:55

## 2019-09-04 RX ADMIN — Medication 9.3 MILLICURIE: at 09:52

## 2019-09-19 ENCOUNTER — APPOINTMENT (OUTPATIENT)
Dept: GENERAL RADIOLOGY | Age: 64
End: 2019-09-19
Payer: MEDICAID

## 2019-09-19 ENCOUNTER — HOSPITAL ENCOUNTER (EMERGENCY)
Age: 64
Discharge: HOME | End: 2019-09-19
Payer: MEDICAID

## 2019-09-19 VITALS
SYSTOLIC BLOOD PRESSURE: 132 MMHG | BODY MASS INDEX: 29.19 KG/M2 | HEIGHT: 64 IN | OXYGEN SATURATION: 100 % | RESPIRATION RATE: 16 BRPM | HEART RATE: 70 BPM | DIASTOLIC BLOOD PRESSURE: 73 MMHG | TEMPERATURE: 98 F | WEIGHT: 171 LBS

## 2019-09-19 DIAGNOSIS — S93.601A FOOT SPRAIN, RIGHT, INITIAL ENCOUNTER: Primary | ICD-10-CM

## 2019-09-19 PROCEDURE — 73630 X-RAY EXAM OF FOOT: CPT

## 2019-09-19 PROCEDURE — 99283 EMERGENCY DEPT VISIT LOW MDM: CPT

## 2019-09-19 PROCEDURE — 6370000000 HC RX 637 (ALT 250 FOR IP)

## 2019-09-19 PROCEDURE — 2709999900 HC NON-CHARGEABLE SUPPLY

## 2019-09-19 RX ORDER — LIDOCAINE 50 MG/G
OINTMENT TOPICAL
Qty: 30 G | Refills: 0 | Status: SHIPPED | OUTPATIENT
Start: 2019-09-19 | End: 2019-12-11

## 2019-09-19 RX ORDER — LIDOCAINE 4 G/G
1 PATCH TOPICAL ONCE
Status: DISCONTINUED | OUTPATIENT
Start: 2019-09-19 | End: 2019-09-19 | Stop reason: HOSPADM

## 2019-09-19 RX ORDER — LIDOCAINE 4 G/G
PATCH TOPICAL
Status: DISCONTINUED
Start: 2019-09-19 | End: 2019-09-19 | Stop reason: HOSPADM

## 2019-09-19 ASSESSMENT — PAIN DESCRIPTION - ORIENTATION: ORIENTATION: LEFT

## 2019-09-19 ASSESSMENT — PAIN SCALES - GENERAL: PAINLEVEL_OUTOF10: 9

## 2019-09-19 ASSESSMENT — PAIN DESCRIPTION - LOCATION: LOCATION: FOOT

## 2019-09-19 ASSESSMENT — PAIN DESCRIPTION - ONSET: ONSET: ON-GOING

## 2019-09-19 ASSESSMENT — ENCOUNTER SYMPTOMS
BACK PAIN: 0
COLOR CHANGE: 0

## 2019-09-19 ASSESSMENT — PAIN DESCRIPTION - PAIN TYPE: TYPE: ACUTE PAIN

## 2019-09-19 ASSESSMENT — PAIN DESCRIPTION - FREQUENCY: FREQUENCY: CONTINUOUS

## 2019-09-19 ASSESSMENT — PAIN DESCRIPTION - DESCRIPTORS: DESCRIPTORS: ACHING

## 2019-09-20 ENCOUNTER — TELEPHONE (OUTPATIENT)
Dept: CARDIOLOGY CLINIC | Age: 64
End: 2019-09-20

## 2019-09-23 ENCOUNTER — HOSPITAL ENCOUNTER (OUTPATIENT)
Dept: AUDIOLOGY | Age: 64
Discharge: HOME | End: 2019-09-23

## 2019-09-23 ENCOUNTER — OFFICE VISIT (OUTPATIENT)
Dept: NEUROLOGY | Age: 64
End: 2019-09-23
Payer: MEDICAID

## 2019-09-23 VITALS
HEIGHT: 64 IN | HEART RATE: 68 BPM | BODY MASS INDEX: 29.71 KG/M2 | WEIGHT: 174 LBS | DIASTOLIC BLOOD PRESSURE: 74 MMHG | SYSTOLIC BLOOD PRESSURE: 128 MMHG

## 2019-09-23 DIAGNOSIS — R29.90 STROKE-LIKE SYMPTOMS: Primary | ICD-10-CM

## 2019-09-23 DIAGNOSIS — G44.89 OTHER HEADACHE SYNDROME: ICD-10-CM

## 2019-09-23 PROCEDURE — 9990000010 HC NO CHARGE VISIT: Performed by: AUDIOLOGIST

## 2019-09-23 PROCEDURE — 99213 OFFICE O/P EST LOW 20 MIN: CPT | Performed by: NURSE PRACTITIONER

## 2019-09-23 RX ORDER — TOPIRAMATE 50 MG/1
TABLET, FILM COATED ORAL
Qty: 30 TABLET | Refills: 3 | Status: SHIPPED | OUTPATIENT
Start: 2019-09-23 | End: 2019-12-30

## 2019-09-23 RX ORDER — PANCRELIPASE LIPASE, PANCRELIPASE PROTEASE, PANCRELIPASE AMYLASE 40000; 126000; 168000 [USP'U]/1; [USP'U]/1; [USP'U]/1
CAPSULE, DELAYED RELEASE ORAL
Refills: 2 | COMMUNITY
Start: 2019-09-03 | End: 2019-09-23

## 2019-09-23 RX ORDER — TIOTROPIUM BROMIDE 18 UG/1
CAPSULE ORAL; RESPIRATORY (INHALATION) DAILY
Refills: 0 | COMMUNITY
Start: 2019-09-13 | End: 2020-06-30

## 2019-09-23 NOTE — PROGRESS NOTES
NEUROLOGY OUT PATIENT FOLLOW UP NOTE:  9/05/55449:17 PM    Leonela Hanley is here for follow up for truck-like symptoms. She denies any new numbness, or weakness. She can have occasional headache. She reports history of migraine headache. She is here to discuss medication options and the plan of care going forward. ROS:  Respiratory : no cough, no shortness of breath  Cardiac: no chest pain. No palpitations. Renal : no flank pain, no hematuria    Skin: no rash      Allergies   Allergen Reactions    Seasonal Other (See Comments)     sneezing       Current Outpatient Medications:     SPIRIVA HANDIHALER 18 MCG inhalation capsule, , Disp: , Rfl: 0    lidocaine (XYLOCAINE) 5 % ointment, Apply topically as needed. , Disp: 30 g, Rfl: 0    hydrochlorothiazide (HYDRODIURIL) 25 MG tablet, TAKE 1 TABLET BY MOUTH TWICE A DAY, Disp: 60 tablet, Rfl: 0    escitalopram (LEXAPRO) 20 MG tablet, Take 20 mg by mouth daily, Disp: , Rfl:     escitalopram (LEXAPRO) 10 MG tablet, Take 10 mg by mouth daily, Disp: , Rfl:     ranitidine (ZANTAC) 150 MG capsule, Take 150 mg by mouth daily, Disp: , Rfl:     fenofibrate 160 MG tablet, Take 160 mg by mouth daily, Disp: , Rfl:     ARIPiprazole (ABILIFY) 5 MG tablet, Take 1 tablet by mouth daily, Disp: 30 tablet, Rfl: 0    folic acid (FOLVITE) 1 MG tablet, Take 1 tablet by mouth daily, Disp: 30 tablet, Rfl: 3    aspirin 81 MG chewable tablet, Take 1 tablet by mouth daily, Disp: 30 tablet, Rfl: 3    lipase-protease-amylase (ZENPEP) 5000 units delayed release capsule, Take 2 capsules by mouth 3 times daily (with meals) And 1 capsule with snacks, Disp: , Rfl:     nystatin (MYCOSTATIN) POWD powder, Apply 1 each topically 2 times daily, Disp: , Rfl:     clopidogrel (PLAVIX) 75 MG tablet, TAKE 1 TABLET BY MOUTH DAILY, Disp: 28 tablet, Rfl: 3    nystatin-triamcinolone (MYCOLOG II) 549180-1.1 UNIT/GM-% cream, Apply topically 4 times daily. , Disp: 2 Tube, Rfl: 1   hydrOXYzine (VISTARIL) 50 MG capsule, Take 50 mg by mouth 3 times daily as needed for Itching or Anxiety , Disp: , Rfl:     traZODone (DESYREL) 100 MG tablet, Take 2 tablets by mouth nightly, Disp: 30 tablet, Rfl: 0    QUEtiapine (SEROQUEL XR) 300 MG extended release tablet, Take 2 tablets by mouth nightly (Patient taking differently: Take 600 mg by mouth nightly ), Disp: 30 tablet, Rfl: 0    isosorbide dinitrate (ISORDIL) 10 MG tablet, TAKE 1 TABLET BY MOUTH 3 TIMES DAILY, Disp: 270 tablet, Rfl: 3    carvedilol (COREG) 3.125 MG tablet, TAKE 1 TABLET BY MOUTH 2 TIMES DAILY (WITH MEALS), Disp: 180 tablet, Rfl: 3    levothyroxine (SYNTHROID) 75 MCG tablet, Take 75 mcg by mouth Six times weekly everyday except Sunday take 150 mcg., Disp: , Rfl:     pantoprazole (PROTONIX) 40 MG tablet, Take 1 tablet by mouth daily Indications: Gastroesophageal Reflux Disease, Disp: 10 tablet, Rfl: 0    TUDORZA PRESSAIR 400 MCG/ACT AEPB inhaler, Inhale 1 puff into the lungs 2 times daily, Disp: 1 each, Rfl: 5    albuterol sulfate  (90 Base) MCG/ACT inhaler, Inhale 2 puffs into the lungs every 6 hours as needed for Wheezing, Disp: 1 Inhaler, Rfl: 5    nicotine (NICODERM CQ) 21 MG/24HR, Place 1 patch onto the skin daily, Disp: 42 patch, Rfl: 3    mirtazapine (REMERON) 30 MG tablet, Take 1 tablet by mouth nightly (Patient not taking: Reported on 9/23/2019), Disp: 30 tablet, Rfl: 0    Cyanocobalamin (VITAMIN B-12) 3000 MCG SUBL, Place 1 tablet under the tongue Daily (Patient not taking: Reported on 9/23/2019), Disp: 30 tablet, Rfl: 2    nabumetone (RELAFEN) 500 MG tablet, Take 1 tablet by mouth 2 times daily (Patient not taking: Reported on 9/23/2019), Disp: 30 tablet, Rfl: 0    alosetron (LOTRONEX) 0.5 MG tablet, Take 0.5 mg by mouth 2 times daily, Disp: , Rfl:     benzonatate (TESSALON PERLES) 100 MG capsule, Take 100 mg by mouth 3 times daily, Disp: , Rfl:     Albuterol Sulfate (VENTOLIN HFA IN), Inhale 90 mg into Result Value Ref Range    Glucose 135 (H) 70 - 108 mg/dL    CREATININE 0.7 0.4 - 1.2 mg/dL    BUN 13 7 - 22 mg/dL    Sodium 137 135 - 145 meq/L    Potassium 3.9 3.5 - 5.2 meq/L    Chloride 94 (L) 98 - 111 meq/L    CO2 28 23 - 33 meq/L    Calcium 10.7 (H) 8.5 - 10.5 mg/dL    AST 31 5 - 40 U/L    Alkaline Phosphatase 42 38 - 126 U/L    Total Protein 6.9 6.1 - 8.0 g/dL    Alb 4.6 3.5 - 5.1 g/dL    Total Bilirubin 0.4 0.3 - 1.2 mg/dL    ALT 30 11 - 66 U/L   Protime-INR   Result Value Ref Range    INR 1.09 0.85 - 1.13   APTT   Result Value Ref Range    aPTT 35.1 22.0 - 38.0 seconds   Brain Natriuretic Peptide   Result Value Ref Range    Pro-BNP 81.7 0.0 - 900.0 pg/mL   Osmolality   Result Value Ref Range    Osmolality Calc 276.0 275.0 - 300 mOsmol/kg   Anion Gap   Result Value Ref Range    Anion Gap 15.0 8.0 - 16.0 meq/L   Glomerular Filtration Rate, Estimated   Result Value Ref Range    Est, Glom Filt Rate 85 (A) ml/min/1.73m2   Troponin   Result Value Ref Range    Troponin T < 0.010 ng/ml   EKG Chest Pain   Result Value Ref Range    Ventricular Rate 66 BPM    Atrial Rate 66 BPM    P-R Interval 150 ms    QRS Duration 88 ms    Q-T Interval 466 ms    QTc Calculation (Bazett) 488 ms    P Axis 63 degrees    R Axis 35 degrees    T Axis 21 degrees          Results for orders placed during the hospital encounter of 07/17/14   MRI Cervical Spine WO Contrast    Narrative       [facility]          Patient Name: Viridiana Ferreira        Patient Type: Ced Solis           MRN:  [id number]        Gender:  Female                   Account Number:  [de-identified]        [address] [facility] Phys:  Dahlia Tovar M.D.     YOB: 1955                                          Pt Loc:  Outpatient                             R a d i o l o g y   R e p o r t         Accession Number:  Procedure Name:             Exam Date/Time:       [id number]      MRI Spine Cervical Canal WO 7/17/2014 1:39:38 PM                          Contr MRI brain without contrast    Narrative PROCEDURE: MRI BRAIN WO CONTRAST    CLINICAL INFORMATION STROKE. Some slurred speech and right arm tingling since yesterday. COMPARISON: Head CT 5/5/2019. TECHNIQUE: Multiplanar and multiple spin echo MRI images were obtained of the brain without contrast.    FINDINGS:        The diffusion-weighted images are normal.  The brain volume is at the lower limits of normal. There is symmetric mild high signal along the corticospinal tracts bilaterally. This is seen on the FLAIR and T2-weighted images. There are no other areas of   abnormal signal.    There are no intra-or extra-axial collections. There is no hydrocephalus, midline shift or mass effect. There is no susceptibility artifact in the brain. The major intracranial vascular flow voids are present. The midline craniocervical junction structures are normal.  The pituitary gland and brainstem are normal.            Impression    1. No evidence of an acute infarct. 2. Mild signal hyperintensity in the corticospinal tracts bilaterally. This can be seen in normal healthy patients. This can also be seen in amyotrophic lateral sclerosis. Clinical correlation is recommended. **This report has been created using voice recognition software. It may contain minor errors which are inherent in voice recognition technology. **    Final report electronically signed by Dr. Jazz Velasquez on 5/6/2019 2:20 PM     Results for orders placed during the hospital encounter of 05/06/16   MRI Brain W WO Contrast    Narrative PROCEDURE: MRI BRAIN W WO CONTRAST    CLINICAL INFORMATIONSNHL (sensory-neural hearing loss), asymmetrical, Presbycusis, bilateral. Bilateral ear ringing. Sensorineural healing loss. Unsteady gait. Visible lump on her head. COMPARISON: Brain MRI 10/23/2013.     TECHNIQUE: Multiplanar and multiple spin echo T1 and T2-weighted images were obtained through the brain before and after the administration of recognition technology. **        Final report electronically signed by Dr. Frankie Coyle on 5/6/2016 4:48 PM       No results found for this or any previous visit. Results for orders placed during the hospital encounter of 05/13/19   CT HEAD WO CONTRAST    Narrative PROCEDURE: CT HEAD WO CONTRAST    CLINICAL INFORMATION: Headache dizziness. COMPARISON: Noncontrast brain CT dated 5/5/2019    TECHNIQUE: Noncontrast 5 mm axial images were obtained through the brain. Sagittal and coronal reconstructions were obtained and reviewed. All CT scans at this facility use dose modulation, iterative reconstruction, and/or weight-based dosing when appropriate to reduce radiation dose to as low as reasonably achievable. FINDINGS:    Brain: There is no acute ischemic infarct, hemorrhage, midline shift, mass, or mass effect. There is no focal abnormality of brain parenchymal attenuation. Ventricles/basal cisterns: The ventricles and cisterns are of appropriate size and configuration for the patient's age. No evidence of obstructive hydrocephalus. Skull base/calvarium: Unremarkable  Soft tissues: Unremarkable  Intraorbital contents: Unremarkable  Sinuses: Unremarkable  Mastoids: Unremarkable        Impression No acute ischemic infarct, hemorrhage, or mass effect. **This report has been created using voice recognition software. It may contain minor errors which are inherent in voice recognition technology. **    Final report electronically signed by Dr. Latisha Brito on 5/14/2019 2:08 AM   6/17/2019  4:48 PM - Cassandra Valentine Incoming Lab Results From Soft     Component Value Ref Range & Units Status Collected Lab   Sed Rate 5  0 - 20 mm/hr Final 06/17/2019  3:16 PM  - [facility]            Assessment:     Diagnosis Orders   1. Stroke-like symptoms     2. Other headache syndrome          She is doing well. She denies any new symptoms. No numbness, no weakness. She can have headache that is occurring daily.  She

## 2019-09-23 NOTE — PROGRESS NOTES
Hearing Aid Check:  Patient reporting that she hears sounds far away better than those sounds that are near. Increased MPO. Patient could not tolerate increasing user level to 90%. Encouraged consistent use of both hearing aids not just the left. She will try to increase use. Follow up as needed.   Patient added to ACCESS

## 2019-09-24 ENCOUNTER — HOSPITAL ENCOUNTER (OUTPATIENT)
Age: 64
Discharge: HOME | End: 2019-09-24
Payer: MEDICAID

## 2019-09-24 LAB
ANION GAP SERPL CALCULATED.3IONS-SCNC: 15 MEQ/L (ref 8–16)
AVERAGE GLUCOSE: 165 MG/DL (ref 70–126)
BASOPHILS # BLD: 0.6 %
BASOPHILS ABSOLUTE: 0 THOU/MM3 (ref 0–0.1)
BUN BLDV-MCNC: 14 MG/DL (ref 7–22)
CHLORIDE BLD-SCNC: 93 MEQ/L (ref 98–111)
CO2: 29 MEQ/L (ref 23–33)
CREAT SERPL-MCNC: 0.7 MG/DL (ref 0.4–1.2)
EKG ATRIAL RATE: 64 BPM
EKG P AXIS: 47 DEGREES
EKG P-R INTERVAL: 150 MS
EKG Q-T INTERVAL: 476 MS
EKG QRS DURATION: 88 MS
EKG QTC CALCULATION (BAZETT): 491 MS
EKG R AXIS: -6 DEGREES
EKG T AXIS: 62 DEGREES
EKG VENTRICULAR RATE: 64 BPM
EOSINOPHIL # BLD: 0.8 %
EOSINOPHILS ABSOLUTE: 0.1 THOU/MM3 (ref 0–0.4)
ERYTHROCYTE [DISTWIDTH] IN BLOOD BY AUTOMATED COUNT: 13.7 % (ref 11.5–14.5)
ERYTHROCYTE [DISTWIDTH] IN BLOOD BY AUTOMATED COUNT: 13.7 % (ref 11.5–14.5)
ERYTHROCYTE [DISTWIDTH] IN BLOOD BY AUTOMATED COUNT: 41 FL (ref 35–45)
ERYTHROCYTE [DISTWIDTH] IN BLOOD BY AUTOMATED COUNT: 41 FL (ref 35–45)
GFR SERPL CREATININE-BSD FRML MDRD: 85 ML/MIN/1.73M2
GLUCOSE BLD-MCNC: 174 MG/DL (ref 70–108)
HBA1C MFR BLD: 7.5 % (ref 4.4–6.4)
HCT VFR BLD CALC: 42.1 % (ref 37–47)
HCT VFR BLD CALC: 42.1 % (ref 37–47)
HEMOGLOBIN: 13.5 GM/DL (ref 12–16)
HEMOGLOBIN: 13.5 GM/DL (ref 12–16)
IMMATURE GRANS (ABS): 0.05 THOU/MM3 (ref 0–0.07)
IMMATURE GRANULOCYTES: 0.6 %
LYMPHOCYTES # BLD: 35.1 %
LYMPHOCYTES ABSOLUTE: 2.8 THOU/MM3 (ref 1–4.8)
MCH RBC QN AUTO: 26.7 PG (ref 26–33)
MCH RBC QN AUTO: 26.7 PG (ref 26–33)
MCHC RBC AUTO-ENTMCNC: 32.1 GM/DL (ref 32.2–35.5)
MCHC RBC AUTO-ENTMCNC: 32.1 GM/DL (ref 32.2–35.5)
MCV RBC AUTO: 83.4 FL (ref 81–99)
MCV RBC AUTO: 83.4 FL (ref 81–99)
MONOCYTES # BLD: 6.5 %
MONOCYTES ABSOLUTE: 0.5 THOU/MM3 (ref 0.4–1.3)
NUCLEATED RED BLOOD CELLS: 0 /100 WBC
PLATELET # BLD: 256 THOU/MM3 (ref 130–400)
PLATELET # BLD: 256 THOU/MM3 (ref 130–400)
PMV BLD AUTO: 9.7 FL (ref 9.4–12.4)
PMV BLD AUTO: 9.7 FL (ref 9.4–12.4)
POTASSIUM SERPL-SCNC: 3.6 MEQ/L (ref 3.5–5.2)
RBC # BLD: 5.05 MILL/MM3 (ref 4.2–5.4)
RBC # BLD: 5.05 MILL/MM3 (ref 4.2–5.4)
SEG NEUTROPHILS: 56.4 %
SEGMENTED NEUTROPHILS ABSOLUTE COUNT: 4.5 THOU/MM3 (ref 1.8–7.7)
SODIUM BLD-SCNC: 137 MEQ/L (ref 135–145)
WBC # BLD: 8 THOU/MM3 (ref 4.8–10.8)
WBC # BLD: 8 THOU/MM3 (ref 4.8–10.8)

## 2019-09-24 PROCEDURE — 85027 COMPLETE CBC AUTOMATED: CPT

## 2019-09-24 PROCEDURE — 80051 ELECTROLYTE PANEL: CPT

## 2019-09-24 PROCEDURE — 84520 ASSAY OF UREA NITROGEN: CPT

## 2019-09-24 PROCEDURE — 83036 HEMOGLOBIN GLYCOSYLATED A1C: CPT

## 2019-09-24 PROCEDURE — 93010 ELECTROCARDIOGRAM REPORT: CPT | Performed by: INTERNAL MEDICINE

## 2019-09-24 PROCEDURE — 82565 ASSAY OF CREATININE: CPT

## 2019-09-24 PROCEDURE — 36415 COLL VENOUS BLD VENIPUNCTURE: CPT

## 2019-09-24 PROCEDURE — 93005 ELECTROCARDIOGRAM TRACING: CPT | Performed by: ORTHOPAEDIC SURGERY

## 2019-09-24 PROCEDURE — 82947 ASSAY GLUCOSE BLOOD QUANT: CPT

## 2019-10-21 RX ORDER — HYDROCHLOROTHIAZIDE 25 MG/1
TABLET ORAL
Qty: 60 TABLET | Refills: 2 | Status: SHIPPED | OUTPATIENT
Start: 2019-10-21 | End: 2020-01-20

## 2019-11-08 LAB
BILIRUBIN URINE: NORMAL
BLOOD, URINE: NORMAL
CLARITY: CLEAR
COLOR: YELLOW
GLUCOSE URINE: NEGATIVE
KETONES, URINE: NEGATIVE
LEUKOCYTE ESTERASE, URINE: NORMAL
NITRITE, URINE: NEGATIVE
PH UA: 7 (ref 4.5–8)
PROTEIN UA: NEGATIVE
SPECIFIC GRAVITY UA: 1.01 (ref 1–1.03)
UROBILINOGEN, URINE: NORMAL

## 2019-12-11 ENCOUNTER — OFFICE VISIT (OUTPATIENT)
Dept: CARDIOLOGY CLINIC | Age: 64
End: 2019-12-11
Payer: MEDICAID

## 2019-12-11 VITALS
BODY MASS INDEX: 28.51 KG/M2 | WEIGHT: 167 LBS | SYSTOLIC BLOOD PRESSURE: 118 MMHG | DIASTOLIC BLOOD PRESSURE: 72 MMHG | HEIGHT: 64 IN | HEART RATE: 76 BPM

## 2019-12-11 DIAGNOSIS — I50.32 CHRONIC DIASTOLIC HEART FAILURE (HCC): Primary | ICD-10-CM

## 2019-12-11 DIAGNOSIS — I10 ESSENTIAL HYPERTENSION: ICD-10-CM

## 2019-12-11 PROBLEM — R16.0 LARGE LIVER: Status: ACTIVE | Noted: 2019-12-11

## 2019-12-11 PROBLEM — M23.90 DERANGEMENT OF KNEE: Status: ACTIVE | Noted: 2019-12-11

## 2019-12-11 PROBLEM — E11.9 TYPE 2 DIABETES MELLITUS WITHOUT COMPLICATION, WITHOUT LONG-TERM CURRENT USE OF INSULIN (HCC): Status: ACTIVE | Noted: 2019-07-20

## 2019-12-11 PROBLEM — K86.89 PANCREATIC INSUFFICIENCY: Status: ACTIVE | Noted: 2019-07-20

## 2019-12-11 PROBLEM — M22.40: Status: ACTIVE | Noted: 2019-12-11

## 2019-12-11 PROBLEM — R20.9 DISTURBANCE OF SKIN SENSATION: Status: ACTIVE | Noted: 2019-12-11

## 2019-12-11 PROBLEM — Z86.73 HISTORY OF ARTERIAL ISCHEMIC STROKE: Status: ACTIVE | Noted: 2019-07-20

## 2019-12-11 PROBLEM — R09.81 NASAL CONGESTION: Status: ACTIVE | Noted: 2019-07-20

## 2019-12-11 PROBLEM — G89.29 CHRONIC MIDLINE LOW BACK PAIN WITHOUT SCIATICA: Status: ACTIVE | Noted: 2019-07-20

## 2019-12-11 PROBLEM — Z48.89 ENCOUNTER FOR SURGICAL FOLLOW-UP CARE: Status: ACTIVE | Noted: 2019-12-11

## 2019-12-11 PROBLEM — J30.89 ENVIRONMENTAL AND SEASONAL ALLERGIES: Status: ACTIVE | Noted: 2019-07-20

## 2019-12-11 PROBLEM — I20.1 CORONARY VASOSPASM (HCC): Status: ACTIVE | Noted: 2019-08-17

## 2019-12-11 PROBLEM — E78.2 MIXED HYPERLIPIDEMIA: Status: ACTIVE | Noted: 2019-07-20

## 2019-12-11 PROBLEM — N80.9 ENDOMETRIOSIS: Status: ACTIVE | Noted: 2019-12-11

## 2019-12-11 PROBLEM — M15.0 PRIMARY OSTEOARTHRITIS INVOLVING MULTIPLE JOINTS: Status: ACTIVE | Noted: 2019-07-20

## 2019-12-11 PROBLEM — M54.50 CHRONIC MIDLINE LOW BACK PAIN WITHOUT SCIATICA: Status: ACTIVE | Noted: 2019-07-20

## 2019-12-11 PROBLEM — M15.9 PRIMARY OSTEOARTHRITIS INVOLVING MULTIPLE JOINTS: Status: ACTIVE | Noted: 2019-07-20

## 2019-12-11 PROBLEM — N39.490 URINARY INCONTINENCE, OVERFLOW: Status: ACTIVE | Noted: 2019-07-20

## 2019-12-11 PROBLEM — H40.009 GLAUCOMA SUSPECT: Status: ACTIVE | Noted: 2019-12-11

## 2019-12-11 PROBLEM — G56.02 CARPAL TUNNEL SYNDROME OF LEFT WRIST: Status: ACTIVE | Noted: 2019-12-11

## 2019-12-11 PROBLEM — G56.20 LESION OF ULNAR NERVE: Status: ACTIVE | Noted: 2019-12-11

## 2019-12-11 PROBLEM — R41.3 AMNESIA: Status: ACTIVE | Noted: 2019-12-11

## 2019-12-11 PROBLEM — S93.601A SPRAIN OF RIGHT FOOT: Status: ACTIVE | Noted: 2019-12-11

## 2019-12-11 PROCEDURE — 99213 OFFICE O/P EST LOW 20 MIN: CPT | Performed by: PHYSICIAN ASSISTANT

## 2019-12-11 RX ORDER — FLUTICASONE PROPIONATE 50 MCG
1 SPRAY, SUSPENSION (ML) NASAL DAILY
Status: ON HOLD | COMMUNITY
End: 2019-12-26 | Stop reason: SDUPTHER

## 2019-12-11 RX ORDER — ATORVASTATIN CALCIUM 40 MG/1
40 TABLET, FILM COATED ORAL NIGHTLY
COMMUNITY

## 2019-12-11 RX ORDER — FERROUS SULFATE 325(65) MG
325 TABLET ORAL
Status: ON HOLD | COMMUNITY
End: 2019-12-26 | Stop reason: SDUPTHER

## 2019-12-11 RX ORDER — FAMOTIDINE 40 MG/1
40 TABLET, FILM COATED ORAL DAILY
COMMUNITY
End: 2020-07-08

## 2019-12-21 ENCOUNTER — APPOINTMENT (OUTPATIENT)
Dept: GENERAL RADIOLOGY | Age: 64
DRG: 720 | End: 2019-12-21
Payer: MEDICAID

## 2019-12-21 ENCOUNTER — APPOINTMENT (OUTPATIENT)
Dept: CT IMAGING | Age: 64
DRG: 720 | End: 2019-12-21
Payer: MEDICAID

## 2019-12-21 ENCOUNTER — HOSPITAL ENCOUNTER (INPATIENT)
Age: 64
LOS: 5 days | Discharge: HOME | DRG: 720 | End: 2019-12-26
Attending: EMERGENCY MEDICINE | Admitting: INTERNAL MEDICINE
Payer: MEDICAID

## 2019-12-21 PROBLEM — N12 PYELONEPHRITIS OF LEFT KIDNEY: Status: ACTIVE | Noted: 2019-12-21

## 2019-12-21 PROBLEM — R79.89 ELEVATED LACTIC ACID LEVEL: Status: ACTIVE | Noted: 2019-12-21

## 2019-12-21 PROBLEM — N12 PYELONEPHRITIS: Status: ACTIVE | Noted: 2019-12-21

## 2019-12-21 PROBLEM — N17.1 ACUTE RENAL FAILURE WITH ACUTE CORTICAL NECROSIS (HCC): Status: ACTIVE | Noted: 2019-12-21

## 2019-12-21 PROBLEM — A41.9 SEPSIS (HCC): Status: ACTIVE | Noted: 2019-12-21

## 2019-12-21 LAB
ALBUMIN SERPL-MCNC: 3.8 G/DL (ref 3.5–5.1)
ALLEN TEST: ABNORMAL
ALP BLD-CCNC: 42 U/L (ref 38–126)
ALT SERPL-CCNC: 17 U/L (ref 11–66)
AMMONIA: 40 UMOL/L (ref 11–60)
AMORPHOUS: ABNORMAL
AMYLASE: 19 U/L (ref 20–104)
ANION GAP SERPL CALCULATED.3IONS-SCNC: 18 MEQ/L (ref 8–16)
APTT: 39 SECONDS (ref 22–38)
AST SERPL-CCNC: 22 U/L (ref 5–40)
BACTERIA: ABNORMAL /HPF
BASE EXCESS (CALCULATED): -2.9 MMOL/L (ref -2.5–2.5)
BASOPHILS # BLD: 0.3 %
BASOPHILS ABSOLUTE: 0.1 THOU/MM3 (ref 0–0.1)
BILIRUB SERPL-MCNC: 0.6 MG/DL (ref 0.3–1.2)
BILIRUBIN DIRECT: 0.4 MG/DL (ref 0–0.3)
BILIRUBIN URINE: NEGATIVE
BLOOD, URINE: NEGATIVE
BUN BLDV-MCNC: 23 MG/DL (ref 7–22)
C-REACTIVE PROTEIN: 37.05 MG/DL (ref 0–1)
CALCIUM SERPL-MCNC: 9.9 MG/DL (ref 8.5–10.5)
CASTS 2: ABNORMAL /LPF
CASTS UA: ABNORMAL /LPF
CHARACTER, URINE: CLEAR
CHLORIDE BLD-SCNC: 94 MEQ/L (ref 98–111)
CO2: 19 MEQ/L (ref 23–33)
COLLECTED BY:: ABNORMAL
COLOR: ABNORMAL
CREAT SERPL-MCNC: 1.4 MG/DL (ref 0.4–1.2)
CRYSTALS, UA: ABNORMAL
DEVICE: ABNORMAL
EOSINOPHIL # BLD: 0.2 %
EOSINOPHILS ABSOLUTE: 0.1 THOU/MM3 (ref 0–0.4)
EPITHELIAL CELLS, UA: ABNORMAL /HPF
ERYTHROCYTE [DISTWIDTH] IN BLOOD BY AUTOMATED COUNT: 14.4 % (ref 11.5–14.5)
ERYTHROCYTE [DISTWIDTH] IN BLOOD BY AUTOMATED COUNT: 43.1 FL (ref 35–45)
FLU A ANTIGEN: NEGATIVE
FLU B ANTIGEN: NEGATIVE
GFR SERPL CREATININE-BSD FRML MDRD: 38 ML/MIN/1.73M2
GLUCOSE BLD-MCNC: 163 MG/DL (ref 70–108)
GLUCOSE BLD-MCNC: 168 MG/DL (ref 70–108)
GLUCOSE BLD-MCNC: 170 MG/DL (ref 70–108)
GLUCOSE URINE: NEGATIVE MG/DL
HCO3: 20 MMOL/L (ref 23–28)
HCT VFR BLD CALC: 36.1 % (ref 37–47)
HEMOGLOBIN: 11.8 GM/DL (ref 12–16)
IMMATURE GRANS (ABS): 0.33 THOU/MM3 (ref 0–0.07)
IMMATURE GRANULOCYTES: 1.3 %
INR BLD: 1.62 (ref 0.85–1.13)
KETONES, URINE: NEGATIVE
LACTIC ACID: 1.5 MMOL/L (ref 0.5–2.2)
LACTIC ACID: 1.6 MMOL/L (ref 0.5–2.2)
LACTIC ACID: 2.4 MMOL/L (ref 0.5–2.2)
LEUKOCYTE ESTERASE, URINE: ABNORMAL
LIPASE: 6.5 U/L (ref 5.6–51.3)
LYMPHOCYTES # BLD: 3.2 %
LYMPHOCYTES ABSOLUTE: 0.8 THOU/MM3 (ref 1–4.8)
MCH RBC QN AUTO: 27 PG (ref 26–33)
MCHC RBC AUTO-ENTMCNC: 32.7 GM/DL (ref 32.2–35.5)
MCV RBC AUTO: 82.6 FL (ref 81–99)
MISCELLANEOUS 2: ABNORMAL
MONOCYTES # BLD: 4.6 %
MONOCYTES ABSOLUTE: 1.2 THOU/MM3 (ref 0.4–1.3)
MRSA SCREEN RT-PCR: NEGATIVE
NITRITE, URINE: NEGATIVE
NUCLEATED RED BLOOD CELLS: 0 /100 WBC
O2 SATURATION: 96 %
OSMOLALITY CALCULATION: 269.9 MOSMOL/KG (ref 275–300)
PCO2: 27 MMHG (ref 35–45)
PH BLOOD GAS: 7.47 (ref 7.35–7.45)
PH UA: 5.5 (ref 5–9)
PLATELET # BLD: 308 THOU/MM3 (ref 130–400)
PLATELET ESTIMATE: ADEQUATE
PMV BLD AUTO: 8.7 FL (ref 9.4–12.4)
PO2: 74 MMHG (ref 71–104)
POTASSIUM REFLEX MAGNESIUM: 3.7 MEQ/L (ref 3.5–5.2)
PRO-BNP: 4025 PG/ML (ref 0–900)
PROCALCITONIN: 6.21 NG/ML (ref 0.01–0.09)
PROTEIN UA: 30
RBC # BLD: 4.37 MILL/MM3 (ref 4.2–5.4)
RBC URINE: ABNORMAL /HPF
RENAL EPITHELIAL, UA: ABNORMAL
SCAN OF BLOOD SMEAR: NORMAL
SEDIMENTATION RATE, ERYTHROCYTE: 50 MM/HR (ref 0–20)
SEG NEUTROPHILS: 90.4 %
SEGMENTED NEUTROPHILS ABSOLUTE COUNT: 23.2 THOU/MM3 (ref 1.8–7.7)
SODIUM BLD-SCNC: 131 MEQ/L (ref 135–145)
SOURCE, BLOOD GAS: ABNORMAL
SPECIFIC GRAVITY, URINE: 1.02 (ref 1–1.03)
TOTAL PROTEIN: 7 G/DL (ref 6.1–8)
TROPONIN T: < 0.01 NG/ML
UROBILINOGEN, URINE: 1 EU/DL (ref 0–1)
VANCOMYCIN RESISTANT ENTEROCOCCUS: NEGATIVE
WBC # BLD: 25.7 THOU/MM3 (ref 4.8–10.8)
WBC UA: ABNORMAL /HPF
YEAST: ABNORMAL

## 2019-12-21 PROCEDURE — 83605 ASSAY OF LACTIC ACID: CPT

## 2019-12-21 PROCEDURE — 70450 CT HEAD/BRAIN W/O DYE: CPT

## 2019-12-21 PROCEDURE — 99222 1ST HOSP IP/OBS MODERATE 55: CPT | Performed by: OPHTHALMOLOGY

## 2019-12-21 PROCEDURE — 6370000000 HC RX 637 (ALT 250 FOR IP): Performed by: OPHTHALMOLOGY

## 2019-12-21 PROCEDURE — 87641 MR-STAPH DNA AMP PROBE: CPT

## 2019-12-21 PROCEDURE — 2580000003 HC RX 258: Performed by: OPHTHALMOLOGY

## 2019-12-21 PROCEDURE — 36415 COLL VENOUS BLD VENIPUNCTURE: CPT

## 2019-12-21 PROCEDURE — 84484 ASSAY OF TROPONIN QUANT: CPT

## 2019-12-21 PROCEDURE — 6360000004 HC RX CONTRAST MEDICATION: Performed by: EMERGENCY MEDICINE

## 2019-12-21 PROCEDURE — 87804 INFLUENZA ASSAY W/OPTIC: CPT

## 2019-12-21 PROCEDURE — 36600 WITHDRAWAL OF ARTERIAL BLOOD: CPT

## 2019-12-21 PROCEDURE — 71045 X-RAY EXAM CHEST 1 VIEW: CPT

## 2019-12-21 PROCEDURE — 51701 INSERT BLADDER CATHETER: CPT

## 2019-12-21 PROCEDURE — 80076 HEPATIC FUNCTION PANEL: CPT

## 2019-12-21 PROCEDURE — 2140000000 HC CCU INTERMEDIATE R&B

## 2019-12-21 PROCEDURE — 6360000002 HC RX W HCPCS: Performed by: OPHTHALMOLOGY

## 2019-12-21 PROCEDURE — 94761 N-INVAS EAR/PLS OXIMETRY MLT: CPT

## 2019-12-21 PROCEDURE — 6370000000 HC RX 637 (ALT 250 FOR IP): Performed by: EMERGENCY MEDICINE

## 2019-12-21 PROCEDURE — 82140 ASSAY OF AMMONIA: CPT

## 2019-12-21 PROCEDURE — 87040 BLOOD CULTURE FOR BACTERIA: CPT

## 2019-12-21 PROCEDURE — 83880 ASSAY OF NATRIURETIC PEPTIDE: CPT

## 2019-12-21 PROCEDURE — 2709999900 HC NON-CHARGEABLE SUPPLY

## 2019-12-21 PROCEDURE — 99254 IP/OBS CNSLTJ NEW/EST MOD 60: CPT | Performed by: INTERNAL MEDICINE

## 2019-12-21 PROCEDURE — 99285 EMERGENCY DEPT VISIT HI MDM: CPT

## 2019-12-21 PROCEDURE — 82803 BLOOD GASES ANY COMBINATION: CPT

## 2019-12-21 PROCEDURE — 83690 ASSAY OF LIPASE: CPT

## 2019-12-21 PROCEDURE — 84145 PROCALCITONIN (PCT): CPT

## 2019-12-21 PROCEDURE — 81001 URINALYSIS AUTO W/SCOPE: CPT

## 2019-12-21 PROCEDURE — 87500 VANOMYCIN DNA AMP PROBE: CPT

## 2019-12-21 PROCEDURE — 80048 BASIC METABOLIC PNL TOTAL CA: CPT

## 2019-12-21 PROCEDURE — 82948 REAGENT STRIP/BLOOD GLUCOSE: CPT

## 2019-12-21 PROCEDURE — 6360000002 HC RX W HCPCS: Performed by: EMERGENCY MEDICINE

## 2019-12-21 PROCEDURE — 2500000003 HC RX 250 WO HCPCS: Performed by: EMERGENCY MEDICINE

## 2019-12-21 PROCEDURE — 2580000003 HC RX 258: Performed by: EMERGENCY MEDICINE

## 2019-12-21 PROCEDURE — 85025 COMPLETE CBC W/AUTO DIFF WBC: CPT

## 2019-12-21 PROCEDURE — 74177 CT ABD & PELVIS W/CONTRAST: CPT

## 2019-12-21 PROCEDURE — 71260 CT THORAX DX C+: CPT

## 2019-12-21 PROCEDURE — 82150 ASSAY OF AMYLASE: CPT

## 2019-12-21 PROCEDURE — 86140 C-REACTIVE PROTEIN: CPT

## 2019-12-21 PROCEDURE — 6360000002 HC RX W HCPCS

## 2019-12-21 PROCEDURE — 6370000000 HC RX 637 (ALT 250 FOR IP)

## 2019-12-21 PROCEDURE — 85651 RBC SED RATE NONAUTOMATED: CPT

## 2019-12-21 PROCEDURE — 02HV33Z INSERTION OF INFUSION DEVICE INTO SUPERIOR VENA CAVA, PERCUTANEOUS APPROACH: ICD-10-PCS | Performed by: EMERGENCY MEDICINE

## 2019-12-21 PROCEDURE — 85610 PROTHROMBIN TIME: CPT

## 2019-12-21 PROCEDURE — 85730 THROMBOPLASTIN TIME PARTIAL: CPT

## 2019-12-21 PROCEDURE — 87081 CULTURE SCREEN ONLY: CPT

## 2019-12-21 RX ORDER — ARIPIPRAZOLE 5 MG/1
5 TABLET ORAL DAILY
Status: DISCONTINUED | OUTPATIENT
Start: 2019-12-21 | End: 2019-12-26 | Stop reason: HOSPADM

## 2019-12-21 RX ORDER — PANTOPRAZOLE SODIUM 40 MG/1
40 TABLET, DELAYED RELEASE ORAL DAILY
Status: DISCONTINUED | OUTPATIENT
Start: 2019-12-21 | End: 2019-12-26 | Stop reason: HOSPADM

## 2019-12-21 RX ORDER — LEVOTHYROXINE SODIUM 0.07 MG/1
75 TABLET ORAL DAILY
Status: DISCONTINUED | OUTPATIENT
Start: 2019-12-21 | End: 2019-12-26 | Stop reason: HOSPADM

## 2019-12-21 RX ORDER — ISOSORBIDE DINITRATE 10 MG/1
10 TABLET ORAL 3 TIMES DAILY
Status: DISCONTINUED | OUTPATIENT
Start: 2019-12-21 | End: 2019-12-26 | Stop reason: HOSPADM

## 2019-12-21 RX ORDER — ASPIRIN 81 MG/1
81 TABLET, CHEWABLE ORAL DAILY
Status: DISCONTINUED | OUTPATIENT
Start: 2019-12-21 | End: 2019-12-26 | Stop reason: HOSPADM

## 2019-12-21 RX ORDER — CLOPIDOGREL BISULFATE 75 MG/1
75 TABLET ORAL DAILY
Status: DISCONTINUED | OUTPATIENT
Start: 2019-12-21 | End: 2019-12-26 | Stop reason: HOSPADM

## 2019-12-21 RX ORDER — HEPARIN SODIUM 5000 [USP'U]/ML
5000 INJECTION, SOLUTION INTRAVENOUS; SUBCUTANEOUS EVERY 8 HOURS SCHEDULED
Status: DISCONTINUED | OUTPATIENT
Start: 2019-12-21 | End: 2019-12-26 | Stop reason: HOSPADM

## 2019-12-21 RX ORDER — ALBUTEROL SULFATE 90 UG/1
1 AEROSOL, METERED RESPIRATORY (INHALATION) EVERY 6 HOURS PRN
Status: DISCONTINUED | OUTPATIENT
Start: 2019-12-21 | End: 2019-12-26 | Stop reason: HOSPADM

## 2019-12-21 RX ORDER — 0.9 % SODIUM CHLORIDE 0.9 %
30 INTRAVENOUS SOLUTION INTRAVENOUS ONCE
Status: COMPLETED | OUTPATIENT
Start: 2019-12-21 | End: 2019-12-21

## 2019-12-21 RX ORDER — MIDAZOLAM HYDROCHLORIDE 5 MG/ML
INJECTION INTRAMUSCULAR; INTRAVENOUS
Status: COMPLETED
Start: 2019-12-21 | End: 2019-12-21

## 2019-12-21 RX ORDER — SODIUM CHLORIDE 9 MG/ML
INJECTION, SOLUTION INTRAVENOUS CONTINUOUS
Status: ACTIVE | OUTPATIENT
Start: 2019-12-21 | End: 2019-12-23

## 2019-12-21 RX ORDER — ACETAMINOPHEN 325 MG/1
650 TABLET ORAL ONCE
Status: COMPLETED | OUTPATIENT
Start: 2019-12-21 | End: 2019-12-21

## 2019-12-21 RX ORDER — CARVEDILOL 3.12 MG/1
3.12 TABLET ORAL 2 TIMES DAILY WITH MEALS
Status: DISCONTINUED | OUTPATIENT
Start: 2019-12-21 | End: 2019-12-26 | Stop reason: HOSPADM

## 2019-12-21 RX ORDER — ACETAMINOPHEN 325 MG/1
650 TABLET ORAL EVERY 4 HOURS PRN
Status: DISCONTINUED | OUTPATIENT
Start: 2019-12-21 | End: 2019-12-26 | Stop reason: HOSPADM

## 2019-12-21 RX ORDER — ONDANSETRON 2 MG/ML
4 INJECTION INTRAMUSCULAR; INTRAVENOUS EVERY 6 HOURS PRN
Status: DISCONTINUED | OUTPATIENT
Start: 2019-12-21 | End: 2019-12-26 | Stop reason: HOSPADM

## 2019-12-21 RX ORDER — ESCITALOPRAM OXALATE 10 MG/1
10 TABLET ORAL DAILY
Status: DISCONTINUED | OUTPATIENT
Start: 2019-12-21 | End: 2019-12-26 | Stop reason: HOSPADM

## 2019-12-21 RX ORDER — ATORVASTATIN CALCIUM 40 MG/1
40 TABLET, FILM COATED ORAL DAILY
Status: DISCONTINUED | OUTPATIENT
Start: 2019-12-21 | End: 2019-12-26 | Stop reason: HOSPADM

## 2019-12-21 RX ORDER — MIDAZOLAM HYDROCHLORIDE 5 MG/ML
10 INJECTION INTRAMUSCULAR; INTRAVENOUS ONCE
Status: COMPLETED | OUTPATIENT
Start: 2019-12-21 | End: 2019-12-21

## 2019-12-21 RX ORDER — ACETAMINOPHEN 325 MG/1
TABLET ORAL
Status: COMPLETED
Start: 2019-12-21 | End: 2019-12-21

## 2019-12-21 RX ADMIN — CLOPIDOGREL BISULFATE 75 MG: 75 TABLET ORAL at 14:30

## 2019-12-21 RX ADMIN — VANCOMYCIN HYDROCHLORIDE 1000 MG: 1 INJECTION, POWDER, LYOPHILIZED, FOR SOLUTION INTRAVENOUS at 02:58

## 2019-12-21 RX ADMIN — MIDAZOLAM HYDROCHLORIDE 10 MG: 5 INJECTION, SOLUTION INTRAMUSCULAR; INTRAVENOUS at 08:35

## 2019-12-21 RX ADMIN — ISOSORBIDE DINITRATE 10 MG: 10 TABLET ORAL at 22:21

## 2019-12-21 RX ADMIN — LEVOTHYROXINE SODIUM 75 MCG: 75 TABLET ORAL at 15:36

## 2019-12-21 RX ADMIN — ACETAMINOPHEN 650 MG: 325 TABLET ORAL at 22:20

## 2019-12-21 RX ADMIN — ARIPIPRAZOLE 5 MG: 5 TABLET ORAL at 15:37

## 2019-12-21 RX ADMIN — PANTOPRAZOLE SODIUM 40 MG: 40 TABLET, DELAYED RELEASE ORAL at 14:30

## 2019-12-21 RX ADMIN — ACETAMINOPHEN 650 MG: 325 TABLET ORAL at 11:06

## 2019-12-21 RX ADMIN — PIPERACILLIN AND TAZOBACTAM 3.38 G: 3; .375 INJECTION, POWDER, LYOPHILIZED, FOR SOLUTION INTRAVENOUS at 02:58

## 2019-12-21 RX ADMIN — ACETAMINOPHEN 650 MG: 325 TABLET ORAL at 02:58

## 2019-12-21 RX ADMIN — MIDAZOLAM HYDROCHLORIDE 10 MG: 5 INJECTION INTRAMUSCULAR; INTRAVENOUS at 08:35

## 2019-12-21 RX ADMIN — ESCITALOPRAM 10 MG: 10 TABLET, FILM COATED ORAL at 15:37

## 2019-12-21 RX ADMIN — HEPARIN SODIUM 5000 UNITS: 5000 INJECTION INTRAVENOUS; SUBCUTANEOUS at 22:21

## 2019-12-21 RX ADMIN — ATORVASTATIN CALCIUM 40 MG: 40 TABLET, FILM COATED ORAL at 15:37

## 2019-12-21 RX ADMIN — SODIUM CHLORIDE 2274 ML: 9 INJECTION, SOLUTION INTRAVENOUS at 02:10

## 2019-12-21 RX ADMIN — Medication 10 MCG/MIN: at 09:16

## 2019-12-21 RX ADMIN — SODIUM CHLORIDE: 9 INJECTION, SOLUTION INTRAVENOUS at 10:37

## 2019-12-21 RX ADMIN — PIPERACILLIN AND TAZOBACTAM 3.38 G: 3; .375 INJECTION, POWDER, LYOPHILIZED, FOR SOLUTION INTRAVENOUS at 15:37

## 2019-12-21 RX ADMIN — IOPAMIDOL 80 ML: 755 INJECTION, SOLUTION INTRAVENOUS at 04:48

## 2019-12-21 RX ADMIN — HEPARIN SODIUM 5000 UNITS: 5000 INJECTION INTRAVENOUS; SUBCUTANEOUS at 15:38

## 2019-12-21 RX ADMIN — QUETIAPINE FUMARATE 500 MG: 300 TABLET, EXTENDED RELEASE ORAL at 22:20

## 2019-12-21 RX ADMIN — SODIUM CHLORIDE: 9 INJECTION, SOLUTION INTRAVENOUS at 23:27

## 2019-12-21 RX ADMIN — ASPIRIN 81 MG 81 MG: 81 TABLET ORAL at 14:30

## 2019-12-21 RX ADMIN — PIPERACILLIN AND TAZOBACTAM 3.38 G: 3; .375 INJECTION, POWDER, LYOPHILIZED, FOR SOLUTION INTRAVENOUS at 23:41

## 2019-12-21 ASSESSMENT — PAIN SCALES - GENERAL
PAINLEVEL_OUTOF10: 0
PAINLEVEL_OUTOF10: 0

## 2019-12-21 ASSESSMENT — PAIN SCALES - WONG BAKER: WONGBAKER_NUMERICALRESPONSE: 4

## 2019-12-21 NOTE — H&P
[facility]    Patient - Max Pinto   MRN -  [id number]   [facility] [place] [facility] - [de-identified]   - 1955      Date of Admission -  2019 12:54 AM  Date of evaluation -  2019  Room - 4D[facility]   Hospital Day - 0  Consulting - Marta Ch MD Primary Care Physician - Josse Jasmine MD   Chief Complaint   AMS and Hypotension   Active Hospital Problem List      Active Hospital Problems    Diagnosis Date Noted    Elevated lactic acid level [R79.89] 2019    Acute renal failure with acute cortical necrosis ([address] [N17.1] 2019    Pyelonephritis of left kidney [N12] 2019    Pyelonephritis [N12] 2019    Sepsis ([address] [A41.9] 2019    Delirium [R41.0]      HPI   Max Pinto is a 60 y.o. female who presents to Emergency Department with altered mental status.     Patient was confused in the ER and hypotensive, didn't respond to fluid bolus, right SC line was placed and she was started on Levophed briefly which was weaned down once she came to the ICU. She was later transferred to the floor. Per EMS note, assisted-living staff states that patient was not acting right and vomiting yesterday. Patient's Accu-Chek was 168 on arrival.  Patient was febrile with axillary temperature 101.2. Patient was obtunded in the ER. Labs showed elevated WBC 25, lactic acid 2.4, and procal at 6.  Also mildly elevated creatinine   CXR no infiltrates  UA negative   No LP was done due to elevated INR  She was started on Zosyn          Past Medical History         Diagnosis Date    Acute renal failure with acute cortical necrosis (HCC) 2019    Allergic rhinitis     Arthritis     spine    Bipolar disorder ([address]     Blood circulation, collateral     Cancer ([address]     cancerous polyps removed - Dr. Selwyn Colon Colon polyps     COPD (chronic obstructive pulmonary disease) ([address] 2014    Coronary vasospasm ([address] 2019    Depression Smoker     Packs/day: 0.50     Years: 30.00     Pack years: 15.00     Types: Cigarettes     Start date: 4/22/1977    Smokeless tobacco: Never Used   Substance and Sexual Activity    Alcohol use: No     Comment: none for 2 years    Drug use: Yes     Frequency: 1.0 times per week     Types: Cocaine     Comment: smokes cocaine daily - per patient as of 5/14/2019    Sexual activity: Not Currently   Lifestyle    Physical activity:     Days per week: Not on file     Minutes per session: Not on file    Stress: Not on file   Relationships    Social connections:     Talks on phone: Not on file     Gets together: Not on file     Attends Mu-ism service: Not on file     Active member of club or organization: Not on file     Attends meetings of clubs or organizations: Not on file     Relationship status: Not on file    Intimate partner violence:     Fear of current or ex partner: Not on file     Emotionally abused: Not on file     Physically abused: Not on file     Forced sexual activity: Not on file   Other Topics Concern    Not on file   Social History Narrative    Not on file     Family History          Problem Relation Age of Onset    Cancer Mother     Heart Disease Mother     High Blood Pressure Mother     High Cholesterol Mother     Cancer Father         brain    Depression Father     Heart Disease Father     High Cholesterol Father     Mental Illness Father     Cancer Sister     Heart Attack Sister     Heart Disease Maternal Uncle     High Cholesterol Maternal Uncle     Diabetes Maternal Grandmother     Heart Disease Maternal Grandmother     High Cholesterol Maternal Grandmother     Early Death Maternal Grandfather     Heart Disease Maternal Grandfather     High Cholesterol Maternal Grandfather     Stroke Maternal Grandfather     Heart Disease Paternal Grandmother     High Cholesterol Paternal Grandmother     Heart Disease Paternal Grandfather     High Cholesterol Paternal Grandfather ROS    Unobtainable due to AMS  Meds    Current Medications    ARIPiprazole  5 mg Oral Daily    aspirin  81 mg Oral Daily    atorvastatin  40 mg Oral Daily    carvedilol  3.125 mg Oral BID WC    clopidogrel  75 mg Oral Daily    escitalopram  10 mg Oral Daily    isosorbide dinitrate  10 mg Oral TID    levothyroxine  75 mcg Oral Daily    pantoprazole  40 mg Oral Daily    QUEtiapine  500 mg Oral Nightly    piperacillin-tazobactam  3.375 g Intravenous Q8H    heparin (porcine)  5,000 Units Subcutaneous 3 times per day    tiotropium  18 mcg Inhalation Daily     albuterol sulfate HFA, ondansetron, acetaminophen  IV Drips/Infusions   sodium chloride 100 mL/hr at 12/21/19 1037    norepinephrine Stopped (12/21/19 1000)     Vitals    Vitals    height is 5' 4\" (1.626 m) and weight is 167 lb (75.8 kg). Her oral temperature is 99.2 °F (37.3 °C). Her blood pressure is 105/59 (abnormal) and her pulse is 95. Her respiration is 29 and oxygen saturation is 95%. I/O    Intake/Output Summary (Last 24 hours) at 12/21/2019 1356  Last data filed at 12/21/2019 1300  Gross per 24 hour   Intake 610 ml   Output --   Net 610 ml     Patient Vitals for the past 96 hrs (Last 3 readings):   Weight   12/21/19 0108 167 lb (75.8 kg)     Exam   Constitutional: Patient appears chroncially ill  Head: Normocephalic and atraumatic. Mouth/Throat: Oropharynx is clear and moist.  No oral thrush. Eyes: Conjunctivae are normal. Pupils are equal, round, and reactive to light. No scleral icterus. Neck: Neck supple. No JVD or tracheal deviation present. Cardiovascular: Regular rate, regular rhythm, S1 and S2 with no murmur. No peripheral edema  Pulmonary/Chest: Normal effort with clear breath sounds. No stridor. No respiratory distress. Abdominal: Soft. Bowel sounds audible. No distension or tenderness to palp  Musculoskeletal: Moves all extremities  Lymphadenopathy:  No cervical adenopathy.    Neurological: Patient is confused, agitated. Skin: Skin is warm and dry. Labs   ABG  Lab Results   Component Value Date    PH 7.47 12/21/2019    PO2 74 12/21/2019    PCO2 27 12/21/2019    HCO3 20 12/21/2019    O2SAT 96 12/21/2019     Lab Results   Component Value Date    IFIO2 2 05/14/2019     CBC  Recent Labs     12/21/19 0139   WBC 25.7*   RBC 4.37   HGB 11.8*   HCT 36.1*   MCV 82.6   MCH 27.0   MCHC 32.7      MPV 8.7*      BMP  Recent Labs     12/21/19 0139   *   K 3.7   CL 94*   CO2 19*   BUN 23*   CREATININE 1.4*   GLUCOSE 163*   CALCIUM 9.9     LFT  Recent Labs     12/21/19 0139   AST 22   ALT 17   BILITOT 0.6   ALKPHOS 42   LIPASE 6.5     TROP  Lab Results   Component Value Date    TROPONINT < 0.010 12/21/2019    TROPONINT < 0.010 07/22/2019    TROPONINT < 0.010 07/22/2019     BNP  Lab Results   Component Value Date    PROBNP 4025.0 12/21/2019    PROBNP 81.7 07/22/2019    PROBNP 77.0 05/05/2019     D-Dimer  Lab Results   Component Value Date    DDIMER < 215.00 02/18/2018     Lactic Acid  Recent Labs     12/21/19  0139 12/21/19  0650 12/21/19  1115   LACTA 2.4* 1.6 1.5     INR  Recent Labs     12/21/19 0139   INR 1.62*     PTT  Recent Labs     12/21/19 0139   APTT 39.0*     Glucose  Recent Labs     12/21/19  0122   POCGLU 168*     UA   Recent Labs     12/21/19  0316   PHUR 5.5   COLORU DK YELLOW*   PROTEINU 30*   BLOODU NEGATIVE   RBCUA 0-2   WBCUA 5-10   BACTERIA NONE SEEN   NITRU NEGATIVE   GLUCOSEU NEGATIVE   BILIRUBINUR NEGATIVE   UROBILINOGEN 1.0   KETUA NEGATIVE     Echo        Summary   Normal left ventricle size and systolic function. Ejection fraction was   estimated at 60%. Doppler parameters were consistent with abnormal left ventricular   relaxation (grade 1 diastolic dysfunction).   Cultures    Procalcitonin  Lab Results   Component Value Date    PROCAL 6.21 12/21/2019    PROCAL 0.08 02/18/2018        Radiology    CXR    CT Scans    (See actual reports for details)    Assessment   AMS  Sepsis

## 2019-12-21 NOTE — H&P
History & Physical        Patient:  Jose Llanos  YOB: 1955    MRN: [id number]   Acct:  [de-identified]   Primary Care Physician: Angelica Medrano MD       Chief Complaint:  Confusion. History of Present Illness:   History obtained from chart review and unobtainable from patient due to mental status. The patient is a 60 y.o. female who presented to [address] [place] [address] with confusion. She came from [facility] assisted living. Patient had fever and vomiting. Fever is up to 101.2 .          Past Medical History:        Diagnosis Date    Acute renal failure with acute cortical necrosis (HCC) 12/21/2019    Allergic rhinitis     Arthritis     spine    Bipolar disorder ([address]     Blood circulation, collateral     Cancer ([address] 2011    cancerous polyps removed - Dr. Brink Sic Colon polyps     COPD (chronic obstructive pulmonary disease) ([address] 7/24/2014    Coronary vasospasm ([address] 8/17/2019    Depression     Diverticulitis of colon     GERD (gastroesophageal reflux disease)     Hiatal hernia     History of arterial ischemic stroke 7/20/2019    History of colonoscopy 2002    History of kidney stones     Hx of blood clots 6/17/2014    PE and collar bone area after shoulder surgery    Hyperlipidemia     Hypertension     Liver disease     enlarged liver - damaged with alcohol in past but no cirrhosis per patient    Pneumonia 7/24/2014    Suicidal thoughts     2015 admitted to  from [facility]    Thyroid disease     Type 2 diabetes mellitus without complication, without long-term current use of insulin ([address] 7/20/2019    Vomiting     Wears dentures     Wears glasses        Past Surgical History:        Procedure Laterality Date    ABDOMEN SURGERY      x4 removed cysts and ovary removed    CARDIAC SURGERY      heart caths, no stents    CARPAL TUNNEL RELEASE Bilateral 2016    CHOLECYSTECTOMY, LAPAROSCOPIC  6/12/15    Dr. David Cruz, Marianela Tanner MD   lipase-protease-amylase (ZENPEP) 5000 units delayed release capsule Take 2 capsules by mouth 3 times daily (with meals) And 1 capsule with snacks    Historical Provider, MD   nystatin (MYCOSTATIN) POWD powder Apply 1 each topically 2 times daily    Historical Provider, MD   clopidogrel (PLAVIX) 75 MG tablet TAKE 1 TABLET BY MOUTH DAILY 3/8/19   Paola Avalos PA-C   hydrOXYzine (VISTARIL) 50 MG capsule Take 50 mg by mouth 3 times daily as needed for Itching or Anxiety     Historical Provider, MD   traZODone (DESYREL) 100 MG tablet Take 2 tablets by mouth nightly 10/31/18   Sim Seip, MD   QUEtiapine (SEROQUEL XR) 300 MG extended release tablet Take 2 tablets by mouth nightly  Patient taking differently: Take 600 mg by mouth nightly  10/31/18   Sim Seip, MD   Albuterol Sulfate (VENTOLIN HFA IN) Inhale 90 mg into the lungs 2 times daily as needed (2 puffs)     Historical Provider, MD   isosorbide dinitrate (ISORDIL) 10 MG tablet TAKE 1 TABLET BY MOUTH 3 TIMES DAILY 9/21/18   Baron Shanna Mckinney PA-C   carvedilol (COREG) 3.125 MG tablet TAKE 1 TABLET BY MOUTH 2 TIMES DAILY (WITH MEALS) 9/21/18   Kathy Wen PA-C   levothyroxine (SYNTHROID) 75 MCG tablet Take 75 mcg by mouth Six times weekly everyday except Sunday take 150 mcg. Historical Provider, MD   pantoprazole (PROTONIX) 40 MG tablet Take 1 tablet by mouth daily Indications: Gastroesophageal Reflux Disease 4/15/16   Moni Molina MD     Allergies:  Seasonal    Social History:    reports that she has been smoking cigarettes. She started smoking about 42 years ago. She has a 15.00 pack-year smoking history. She has never used smokeless tobacco. She reports current drug use. Frequency: 1.00 time per week. Drug: Cocaine. She reports that she does not drink alcohol.     Family History:   Reviewed:       Problem Relation Age of Onset    Cancer Mother     Heart Disease Mother     High Blood Pressure Mother     High Cholesterol Mother     Cancer Father         brain    Depression Father     Heart Disease Father     High Cholesterol Father     Mental Illness Father     Cancer Sister     Heart Attack Sister     Heart Disease Maternal Uncle     High Cholesterol Maternal Uncle     Diabetes Maternal Grandmother     Heart Disease Maternal Grandmother     High Cholesterol Maternal Grandmother     Early Death Maternal Grandfather     Heart Disease Maternal Grandfather     High Cholesterol Maternal Grandfather     Stroke Maternal Grandfather     Heart Disease Paternal Grandmother     High Cholesterol Paternal Grandmother     Heart Disease Paternal Grandfather     High Cholesterol Paternal Grandfather        Review of Systems:    Pertinent items are noted in HPI. Not obtainable sec to onusion. Physical Exam:  /70   Pulse 92   Temp 100.2 °F (37.9 °C) (Axillary)   Resp 22   Ht 5' 4\" (1.626 m)   Wt 167 lb (75.8 kg)   SpO2 98%   BMI 28.67 kg/m²   General appearance:  No apparent distress, appears stated age and cooperative. HEENT:  Normal cephalic, atraumatic without obvious deformity. Pupils equal, round, and reactive to light. Extra ocular muscles intact. Conjunctivae/corneas clear. Neck: Supple, with full range of motion. No jugular venous distention. Trachea midline. Respiratory:  Normal respiratory effort. Decreased BS at bases bilaterally  without Rales/Wheezes/Rhonchi. Cardiovascular:  Regular rate and regular regular rhythm with normal S1/S2 without murmurs, rubs or gallops. Abdomen: Soft, non-tender, non-distended with normal bowel sounds. Musculoskeletal:  No clubbing, cyanosis. No edema bilaterally. Full range of motion without deformity. Skin: Skin color, texture, turgor normal.  No rashes or lesions. Neurologic:  Neurovascularly intact without any focal sensory/motor deficits.   grossly non-focal.  Psychiatric:  Alert but confused  Capillary Refill: Brisk,< 3 seconds Peripheral Pulses: +2 palpable, equal bilaterally     Review of Labs and Diagnostic Testing:  Labs:     Recent Labs     12/21/19 0139   WBC 25.7*   HGB 11.8*   HCT 36.1*        Recent Labs     12/21/19 0139   *   K 3.7   CL 94*   CO2 19*   BUN 23*   CREATININE 1.4*   CALCIUM 9.9     Recent Labs     12/21/19 0139   AST 22   ALT 17   BILIDIR 0.4*   BILITOT 0.6   ALKPHOS 42     Lab Results   Component Value Date    AMYLASE 19 12/21/2019     Recent Labs     12/21/19 0139   INR 1.62*     Recent Labs     12/21/19 0139   TROPONINT < 0.010     No results for input(s): BNP in the last 72 hours. Recent Labs     12/21/19 0139   LACTA 2.4*     Lab Results   Component Value Date    PROCAL 6.21 12/21/2019    PROCAL 0.08 02/18/2018     Lab Results   Component Value Date    LABA1C 7.5 09/24/2019     Lab Results   Component Value Date    TSH 1.420 05/17/2019       Urinalysis:   Lab Results   Component Value Date    NITRU NEGATIVE 12/21/2019    WBCUA 5-10 12/21/2019    WBCUA 0-2 04/19/2012    BACTERIA NONE SEEN 12/21/2019    RBCUA 0-2 12/21/2019    BLOODU NEGATIVE 12/21/2019    SPECGRAV 1.017 05/14/2019    GLUCOSEU NEGATIVE 12/21/2019     Radiology:   Reviewed: see report for details  CXR: I have reviewed the report  EKG:  No acute changes:   CT Chest W Contrast   Final Result   1. Minimal basilar atelectasis changes. No other acute abnormality of the chest present. **This report has been created using voice recognition software. It may contain minor errors which are inherent in voice recognition technology. **      Final report electronically signed by Dr. Elidia Dakins on 12/21/2019 5:40 AM      CT ABDOMEN PELVIS W IV CONTRAST Additional Contrast? None   Final Result   1. Heterogeneous enhancement left kidney with perinephric stranding. Findings are compatible with nephritis. 2. Small cortical cystic changes of the kidney. 3. Stable fatty liver changes.       **This report has been created using voice recognition software. It may contain minor errors which are inherent in voice recognition technology. **      Final report electronically signed by Dr. Ana Gay on 12/21/2019 5:43 AM      CT head without contrast   Final Result   . Negative CT of the brain. **This report has been created using voice recognition software. It may contain minor errors which are inherent in voice recognition technology. **      Final report electronically signed by Dr. Ana Gay on 12/21/2019 3:24 AM      XR CHEST PORTABLE   Final Result   . Rotated patient positioning. No acute appearing pathology of the chest otherwise suspected. **This report has been created using voice recognition software. It may contain minor errors which are inherent in voice recognition technology. **      Final report electronically signed by Dr. Ana Gay on 12/21/2019 2:14 AM          Code Status: Prior    Assessment/Plan:  Principal Problem:    Pyelonephritis of left kidney  Active Problems:    Delirium    Elevated lactic acid level    Acute renal failure with acute cortical necrosis (HCC)  Resolved Problems:    * No resolved hospital problems. *  62 year old female with h/o COPD, HTN, psychiatric disorder  who presented with febrile illness and confusion. Testing consistent with left pyelonephritis.    IVF  IV Abx  Hold diuretics  BP meds with parameters         DVT prophylaxis: [] Lovenox                                 [] SCDs                                 [x] SQ Heparin                                 [] Encourage ambulation           [] Already on Anticoagulation    Padmini Wynne MD on 12/21/2019 at [address] [place] [address] [id number]

## 2019-12-21 NOTE — ED PROVIDER NOTES
[facility]  eMERGENCY dEPARTMENT eNCOUnter          CHIEF COMPLAINT       Chief Complaint   Patient presents with    Altered Mental Status       Nurses Notes reviewed and I agreeexcept as noted in the HPI. HISTORY OF PRESENT ILLNESS    Hilario Willson is a 60 y.o. female who presents to Emergency Department with altered mental status. Patient is confused. I was not able to reach [facility] assisted living where patient is residing now. EMS reports shows patient has been confused since last night. Per EMS note, assisted-living staff states that patient was not acting right and vomiting yesterday. Patient's Accu-Chek was 168 on arrival.  Patient was febrile with axillary temperature 101.2. Patient was alert, obtunded, disoriented, answering every question with \"yes\". No focal deficits. Limited history due to patient's altered mental status, no family member at bedside, and no response from assisted living when I called. REVIEW OF SYSTEMS     Review of Systems   Unable to perform ROS: Mental status change          PAST MEDICAL HISTORY    has a past medical history of Acute renal failure with acute cortical necrosis ([address] Allergic rhinitis, Arthritis, Bipolar disorder ([address] Blood circulation, collateral, Cancer ([address] Colon polyps, COPD (chronic obstructive pulmonary disease) ([address] Coronary vasospasm (HCC), Depression, Diverticulitis of colon, GERD (gastroesophageal reflux disease), Hiatal hernia, History of arterial ischemic stroke, History of colonoscopy, History of kidney stones, Hx of blood clots, Hyperlipidemia, Hypertension, Liver disease, Pneumonia, Suicidal thoughts, Thyroid disease, Type 2 diabetes mellitus without complication, without long-term current use of insulin ([address] Vomiting, Wears dentures, and Wears glasses. SURGICAL HISTORY      has a past surgical history that includes Mandible fracture surgery (1984); shoulder surgery (2014);  Colonoscopy (MYCOSTATIN) POWD POWDER    Apply 1 each topically 2 times daily    PANTOPRAZOLE (PROTONIX) 40 MG TABLET    Take 1 tablet by mouth daily Indications: Gastroesophageal Reflux Disease    QUETIAPINE (SEROQUEL XR) 300 MG EXTENDED RELEASE TABLET    Take 2 tablets by mouth nightly    SPIRIVA HANDIHALER 18 MCG INHALATION CAPSULE        TOPIRAMATE (TOPAMAX) 50 MG TABLET    Topamax 25 mg tablet daily for one week then 50 mg daily there after. Take with plenty of fluids. TRAZODONE (DESYREL) 100 MG TABLET    Take 2 tablets by mouth nightly    TUDORZA PRESSAIR 400 MCG/ACT AEPB INHALER    Inhale 1 puff into the lungs 2 times daily       ALLERGIES     is allergic to seasonal.    FAMILY HISTORY     She indicated that her mother is . She indicated that her father is . She indicated that her sister is alive. She indicated that her brother is alive. She indicated that the status of her maternal grandmother is unknown. She indicated that the status of her maternal grandfather is unknown. She indicated that the status of her paternal grandmother is unknown. She indicated that the status of her paternal grandfather is unknown. She indicated that the status of her maternal uncle is unknown.   family history includes Cancer in her father, mother, and sister; Depression in her father; Diabetes in her maternal grandmother; Early Death in her maternal grandfather; Heart Attack in her sister; Heart Disease in her father, maternal grandfather, maternal grandmother, maternal uncle, mother, paternal grandfather, and paternal grandmother; High Blood Pressure in her mother; High Cholesterol in her father, maternal grandfather, maternal grandmother, maternal uncle, mother, paternal grandfather, and paternal grandmother; Mental Illness in her father; Stroke in her maternal grandfather. SOCIAL HISTORY      reports that she has been smoking cigarettes. She started smoking about 42 years ago.  She has a 15.00 pack-year smoking history. She has never used smokeless tobacco. She reports current drug use. Frequency: 1.00 time per week. Drug: Cocaine. She reports that she does not drink alcohol. PHYSICAL EXAM     INITIAL VITALS:  height is 5' 4\" (1.626 m) and weight is 167 lb (75.8 kg). Her axillary temperature is 98.6 °F (37 °C). Her blood pressure is 78/48 (abnormal) and her pulse is 100. Her respiration is 19 and oxygen saturation is 95%. Physical Exam  Vitals signs and nursing note reviewed. Constitutional:       Appearance: She is well-developed. She is not diaphoretic. Comments: Confused, dry, and sick looking   HENT:      Head: Normocephalic and atraumatic. Nose: Nose normal.   Eyes:      General: No scleral icterus. Right eye: No discharge. Left eye: No discharge. Conjunctiva/sclera: Conjunctivae normal.      Pupils: Pupils are equal, round, and reactive to light. Neck:      Musculoskeletal: Normal range of motion and neck supple. Vascular: No JVD. Trachea: No tracheal deviation. Cardiovascular:      Rate and Rhythm: Normal rate and regular rhythm. Heart sounds: Normal heart sounds. No murmur. No friction rub. No gallop. Pulmonary:      Effort: Pulmonary effort is normal. No respiratory distress. Breath sounds: Normal breath sounds. No stridor. No wheezing or rales. Chest:      Chest wall: No tenderness. Abdominal:      General: Bowel sounds are normal. There is no distension. Palpations: Abdomen is soft. There is no mass. Tenderness: There is no tenderness. There is no guarding or rebound. Hernia: No hernia is present. Musculoskeletal:         General: No tenderness or deformity. Lymphadenopathy:      Cervical: No cervical adenopathy. Skin:     General: Skin is dry. Capillary Refill: Capillary refill takes less than 2 seconds. Coloration: Skin is not pale. Findings: No erythema or rash.    Neurological:      Mental Status: She is disoriented. Cranial Nerves: No cranial nerve deficit. Sensory: No sensory deficit. Motor: No abnormal muscle tone. Coordination: Coordination normal.      Deep Tendon Reflexes: Reflexes normal.           DIFFERENTIAL DIAGNOSIS:   Sepsis, UTI, pneumonia, dehydration, AMI, electrolyte imbalance, CVA    DIAGNOSTIC RESULTS     EKG: All EKG's are interpreted by the Emergency Department Physician who either signs or Co-signsthis chart in the absence of a cardiologist.  Ordered and pending. RADIOLOGY: non-plain film images(s) such as CT, Ultrasound and MRI are read by the radiologist.    XR CHEST PORTABLE   Final Result   1. Right jugular line has been inserted with catheter tip in superior vena cava. 2. Normal heart size. No effusion. 3. Mild left basilar atelectasis/pneumonia. **This report has been created using voice recognition software. It may contain minor errors which are inherent in voice recognition technology. **      Final report electronically signed by Dr. Yamila Castaneda on 12/21/2019 8:53 AM      CT Chest W Contrast   Final Result   1. Minimal basilar atelectasis changes. No other acute abnormality of the chest present. **This report has been created using voice recognition software. It may contain minor errors which are inherent in voice recognition technology. **      Final report electronically signed by Dr. Lai Hewitt on 12/21/2019 5:40 AM      CT ABDOMEN PELVIS W IV CONTRAST Additional Contrast? None   Final Result   1. Heterogeneous enhancement left kidney with perinephric stranding. Findings are compatible with nephritis. 2. Small cortical cystic changes of the kidney. 3. Stable fatty liver changes. **This report has been created using voice recognition software. It may contain minor errors which are inherent in voice recognition technology. **      Final report electronically signed by Dr. Lai Hewitt on 12/21/2019 5:43 AM      CT head without contrast   Final Result   . Negative CT of the brain. **This report has been created using voice recognition software. It may contain minor errors which are inherent in voice recognition technology. **      Final report electronically signed by Dr. Glorya Habermann on 12/21/2019 3:24 AM      XR CHEST PORTABLE   Final Result   . Rotated patient positioning. No acute appearing pathology of the chest otherwise suspected. **This report has been created using voice recognition software. It may contain minor errors which are inherent in voice recognition technology. **      Final report electronically signed by Dr. Glorya Habermann on 12/21/2019 2:14 AM          []Visualized and interpreted by me   [] Radiologist's Wet Read Report Reviewed   [] Discussed with Radiologist.    Ann Vasquez:   Results for orders placed or performed during the hospital encounter of 12/21/19   Rapid influenza A/B antigens   Result Value Ref Range    Flu A Antigen NEGATIVE NEGATIVE    Flu B Antigen NEGATIVE NEGATIVE   CBC Auto Differential   Result Value Ref Range    WBC 25.7 (H) 4.8 - 10.8 thou/mm3    RBC 4.37 4.20 - 5.40 mill/mm3    Hemoglobin 11.8 (L) 12.0 - 16.0 gm/dl    Hematocrit 36.1 (L) 37.0 - 47.0 %    MCV 82.6 81.0 - 99.0 fL    MCH 27.0 26.0 - 33.0 pg    MCHC 32.7 32.2 - 35.5 gm/dl    RDW-CV 14.4 11.5 - 14.5 %    RDW-SD 43.1 35.0 - 45.0 fL    Platelets 924 183 - 913 thou/mm3    MPV 8.7 (L) 9.4 - 12.4 fL    Seg Neutrophils 90.4 %    Lymphocytes 3.2 %    Monocytes 4.6 %    Eosinophils 0.2 %    Basophils 0.3 %    Immature Granulocytes 1.3 %    Platelet Estimate ADEQUATE Adequate    Segs Absolute 23.2 (H) 1.8 - 7.7 thou/mm3    Lymphocytes Absolute 0.8 (L) 1.0 - 4.8 thou/mm3    Monocytes Absolute 1.2 0.4 - 1.3 thou/mm3    Eosinophils Absolute 0.1 0.0 - 0.4 thou/mm3    Basophils Absolute 0.1 0.0 - 0.1 thou/mm3    Immature Grans (Abs) 0.33 (H) 0.00 - 0.07 thou/mm3    nRBC 0 /100 wbc   Basic Metabolic Panel w/ Reflex to MG   Result Value Ref Range    Sodium 131 (L) 135 - 145 meq/L    Potassium reflex Magnesium 3.7 3.5 - 5.2 meq/L    Chloride 94 (L) 98 - 111 meq/L    CO2 19 (L) 23 - 33 meq/L    Glucose 163 (H) 70 - 108 mg/dL    BUN 23 (H) 7 - 22 mg/dL    CREATININE 1.4 (H) 0.4 - 1.2 mg/dL    Calcium 9.9 8.5 - 10.5 mg/dL   Hepatic Function Panel   Result Value Ref Range    Alb 3.8 3.5 - 5.1 g/dL    Total Bilirubin 0.6 0.3 - 1.2 mg/dL    Bilirubin, Direct 0.4 (H) 0.0 - 0.3 mg/dL    Alkaline Phosphatase 42 38 - 126 U/L    AST 22 5 - 40 U/L    ALT 17 11 - 66 U/L    Total Protein 7.0 6.1 - 8.0 g/dL   Lipase   Result Value Ref Range    Lipase 6.5 5.6 - 51.3 U/L   Amylase   Result Value Ref Range    Amylase 19 (L) 20 - 104 U/L   Troponin   Result Value Ref Range    Troponin T < 0.010 ng/ml   Brain Natriuretic Peptide   Result Value Ref Range    Pro-BNP 4025.0 (H) 0.0 - 900.0 pg/mL   Protime-INR   Result Value Ref Range    INR 1.62 (H) 0.85 - 1.13   APTT   Result Value Ref Range    aPTT 39.0 (H) 22.0 - 38.0 seconds   C-Reactive Protein   Result Value Ref Range    CRP 37.05 (H) 0.00 - 1.00 mg/dl   Sedimentation Rate   Result Value Ref Range    Sed Rate 50 (H) 0 - 20 mm/hr   Blood Gas, Arterial   Result Value Ref Range    pH, Blood Gas 7.47 (H) 7.35 - 7.45    PCO2 27 (L) 35 - 45 mmhg    PO2 74 71 - 104 mmhg    HCO3 20 (L) 23 - 28 mmol/l    Base Excess (Calculated) -2.9 (L) -2.5 - 2.5 mmol/l    O2 Sat 96 %    DEVICE Room Air     Miki Test N/A     Source: R Nereida     COLLECTED BY: [id number]    Lactic Acid, Plasma   Result Value Ref Range    Lactic Acid 2.4 (H) 0.5 - 2.2 mmol/L   Ammonia   Result Value Ref Range    Ammonia 40 11 - 60 umol/L   Lactic acid, plasma   Result Value Ref Range    Lactic Acid 1.6 0.5 - 2.2 mmol/L   Procalcitonin   Result Value Ref Range    Procalcitonin 6.21 (H) 0.01 - 0.09 ng/mL   Anion Gap   Result Value Ref Range    Anion Gap 18.0 (H) 8.0 - 16.0 meq/L   Glomerular Filtration Rate, Estimated   Result Value Ref Range    Est, Glom Filt Rate 38 (A) ml/min/1.73m2   Osmolality   Result Value Ref Range    Osmolality Calc 269.9 (L) 275.0 - 300 mOsmol/kg   Scan of Blood Smear   Result Value Ref Range    SCAN OF BLOOD SMEAR see below    Urine with Reflexed Micro   Result Value Ref Range    Glucose, Ur NEGATIVE NEGATIVE mg/dl    Bilirubin Urine NEGATIVE NEGATIVE    Ketones, Urine NEGATIVE NEGATIVE    Specific Gravity, Urine 1.018 1.002 - 1.03    Blood, Urine NEGATIVE NEGATIVE    pH, UA 5.5 5.0 - 9.0    Protein, UA 30 (A) NEGATIVE    Urobilinogen, Urine 1.0 0.0 - 1.0 eu/dl    Nitrite, Urine NEGATIVE NEGATIVE    Leukocyte Esterase, Urine SMALL (A) NEGATIVE    Color, UA DK YELLOW (A) STRAW-YELL    Character, Urine CLEAR CLEAR-SL C    RBC, UA 0-2 0-2/hpf /hpf    WBC, UA 5-10 0-4/hpf /hpf    Epi Cells 3-5 3-5/hpf /hpf    Amorphous, UA URATES NONE SEEN    Bacteria, UA NONE SEEN FEW/NONE S /hpf    Casts UA 0-4 HYALINE NONE SEEN /lpf    Crystals NONE SEEN NONE SEEN    Renal Epithelial, Urine 0-2 NONE SEEN    Yeast, UA NONE SEEN NONE SEEN    CASTS 2 0-4 FINE GRAN NONE SEEN /lpf    MISCELLANEOUS 2 NONE SEEN    POCT Glucose   Result Value Ref Range    POC Glucose 168 (H) 70 - 108 mg/dl       EMERGENCY DEPARTMENT COURSE:   Vitals:    Vitals:    12/21/19 0256 12/21/19 0628 12/21/19 0837 12/21/19 0917   BP: 106/70 (!) 91/59 (!) 91/58 (!) 78/48   Pulse: 92 83 73 100   Resp: 22 25 18 19   Temp: 100.2 °F (37.9 °C) 98.6 °F (37 °C)     TempSrc: Axillary Axillary     SpO2: 98% 96% 95% 95%   Weight:       Height:           1:15    Patient is seen and evaluated in a timely fashion. MedicalDecision Making    Actions:    Patent airway, no respiratory distress, patient's initial BP is 94/67, patient looks dry, highly suspect this is sepsis with a possible severe sepsis or septic shock. Large-bore IV, normal saline bolus 30 ml/kg. EKG, chest x-ray, and a CT head. Labs including CBC, CMP, troponin, blood culture x2, lactic acid, procalcitonin, PT/INR PTT, ammonia, and urinalysis. Reassessment:    She responded well to saline bolus and IV antibiotics of Zosyn and vancomycin. She was admitted by hospitalist service with stable hemodynamics. No source of infection can be identified. LP cannot be done due to INR is 1.62. She was waiting in ED for about one hour before she was transported to floor but nurse reports that patient was hypotensive again and patient seems more confused. At this time, ICU admission is warranted and I discussed the patient is admitted by Dr. Hayley Cohen. Right IJ CVC is placed and Levophed is started. CRITICAL CARE:   CRITICAL CARE: There was a high probability of clinically significant/life threatening deterioration in this patient's condition which required my urgent intervention. Total critical care time was 60 minutes. This excludes any time for separately reportable procedures. CONSULTS:  Dr. Nick Hardin:  Central Line Placement Procedure Note    Indication: central venous monitoring and need for frequent blood draws    Consent: The patient provided verbal consent for this procedure. Procedure: The patient was positioned appropriately and the skin over the right internal jugular vein was prepped with Chloraprep. Local anesthesia was obtained by infiltration using 1% Lidocaine without epinephrine. A large bore needle was used to identify the vein. A guide wire was then inserted into the vein through the needle. A triple lumen catheter was then inserted into the vessel over the guide wire using the Seldinger technique. All ports showed good, free flowing blood return and were flushed with saline solution. The catheter was then securely fastened to the skin with sutures and covered with a sterile dressing. A post procedure X-ray was ordered and showed no evidence of pneumothorax. The patient tolerated the procedure well.     Complications: multiple attempts due to patient's being agitated          FINAL IMPRESSION      1. Septicemia ([facility] [address]    2.  Altered mental status, unspecified altered mental status type          DISPOSITION/PLAN   Admit    PATIENT REFERRED TO:  Margaret Ibrahim MD  [facility], [address] [place] [address]  [phone]            DISCHARGE MEDICATIONS:  New Prescriptions    No medications on file       (Please note that portions of this note were completed with a voice recognition program.  Efforts were made to edit the dictations but occasionally words aremis-transcribed.)    MD Vinicio Solano MD  12/21/19 5957

## 2019-12-21 NOTE — ED NOTES
Patient central line placed. Patient difficult placement. Patient uncooperative during procedure.  Patient given 10mg versed per verbal order of Dr. Charleen Holstein, RN  12/21/19 3702

## 2019-12-21 NOTE — ED TRIAGE NOTES
Pt presents to the ED via EMS with c/o altered mental status. Pt comes from [facility]. Staff from [facility] states that pt became increasingly confused today, staff also state that she isnt acting right and vomited today. Pt refused her noon and night medications. Pt is febrile upon arrival. VSS, respirations even and unlabored.

## 2019-12-21 NOTE — FLOWSHEET NOTE
Continues to rested well in bed. Disoriented, impulsive, cooperative. Levophed remains off since 1000. Orders received to transfer to 3B. Report called to 3B RN.

## 2019-12-21 NOTE — FLOWSHEET NOTE
1330  Ate lunch, tolerated well. Avila pain. Calm and cooperative but confused. Levo remains off. Son Peter called in for update.

## 2019-12-21 NOTE — ED NOTES
Report called from [facility], Pt is on her way for not acting right and increased confusion. The patient vomited once today and she refused her noon and night time meds.       Nghia Nuñez RN  12/21/19 3681

## 2019-12-22 LAB
ANION GAP SERPL CALCULATED.3IONS-SCNC: 13 MEQ/L (ref 8–16)
BUN BLDV-MCNC: 20 MG/DL (ref 7–22)
C DIFF TOXIN/ANTIGEN: NEGATIVE
CALCIUM SERPL-MCNC: 8.6 MG/DL (ref 8.5–10.5)
CHLORIDE BLD-SCNC: 101 MEQ/L (ref 98–111)
CO2: 20 MEQ/L (ref 23–33)
CREAT SERPL-MCNC: 1.1 MG/DL (ref 0.4–1.2)
EKG ATRIAL RATE: 90 BPM
EKG P AXIS: 60 DEGREES
EKG P-R INTERVAL: 144 MS
EKG Q-T INTERVAL: 394 MS
EKG QRS DURATION: 98 MS
EKG QTC CALCULATION (BAZETT): 481 MS
EKG R AXIS: 2 DEGREES
EKG T AXIS: 37 DEGREES
EKG VENTRICULAR RATE: 90 BPM
ERYTHROCYTE [DISTWIDTH] IN BLOOD BY AUTOMATED COUNT: 14.7 % (ref 11.5–14.5)
ERYTHROCYTE [DISTWIDTH] IN BLOOD BY AUTOMATED COUNT: 44.9 FL (ref 35–45)
GFR SERPL CREATININE-BSD FRML MDRD: 50 ML/MIN/1.73M2
GLUCOSE BLD-MCNC: 111 MG/DL (ref 70–108)
GLUCOSE BLD-MCNC: 118 MG/DL (ref 70–108)
GLUCOSE BLD-MCNC: 119 MG/DL (ref 70–108)
GLUCOSE BLD-MCNC: 136 MG/DL (ref 70–108)
GLUCOSE BLD-MCNC: 138 MG/DL (ref 70–108)
HCT VFR BLD CALC: 28.7 % (ref 37–47)
HEMOGLOBIN: 9.5 GM/DL (ref 12–16)
INR BLD: 1.43 (ref 0.85–1.13)
LACTIC ACID: 1 MMOL/L (ref 0.5–2.2)
MCH RBC QN AUTO: 27.8 PG (ref 26–33)
MCHC RBC AUTO-ENTMCNC: 33.1 GM/DL (ref 32.2–35.5)
MCV RBC AUTO: 83.9 FL (ref 81–99)
PLATELET # BLD: 222 THOU/MM3 (ref 130–400)
PMV BLD AUTO: 8.9 FL (ref 9.4–12.4)
POTASSIUM SERPL-SCNC: 3.1 MEQ/L (ref 3.5–5.2)
RBC # BLD: 3.42 MILL/MM3 (ref 4.2–5.4)
SODIUM BLD-SCNC: 134 MEQ/L (ref 135–145)
TROPONIN T: < 0.01 NG/ML
WBC # BLD: 15.8 THOU/MM3 (ref 4.8–10.8)

## 2019-12-22 PROCEDURE — 84484 ASSAY OF TROPONIN QUANT: CPT

## 2019-12-22 PROCEDURE — 2709999900 HC NON-CHARGEABLE SUPPLY

## 2019-12-22 PROCEDURE — 85610 PROTHROMBIN TIME: CPT

## 2019-12-22 PROCEDURE — 93010 ELECTROCARDIOGRAM REPORT: CPT | Performed by: INTERNAL MEDICINE

## 2019-12-22 PROCEDURE — 80048 BASIC METABOLIC PNL TOTAL CA: CPT

## 2019-12-22 PROCEDURE — 99233 SBSQ HOSP IP/OBS HIGH 50: CPT | Performed by: INTERNAL MEDICINE

## 2019-12-22 PROCEDURE — 94640 AIRWAY INHALATION TREATMENT: CPT

## 2019-12-22 PROCEDURE — 6370000000 HC RX 637 (ALT 250 FOR IP): Performed by: INTERNAL MEDICINE

## 2019-12-22 PROCEDURE — 85027 COMPLETE CBC AUTOMATED: CPT

## 2019-12-22 PROCEDURE — 83605 ASSAY OF LACTIC ACID: CPT

## 2019-12-22 PROCEDURE — 6360000002 HC RX W HCPCS: Performed by: OPHTHALMOLOGY

## 2019-12-22 PROCEDURE — 2580000003 HC RX 258: Performed by: INTERNAL MEDICINE

## 2019-12-22 PROCEDURE — 6370000000 HC RX 637 (ALT 250 FOR IP): Performed by: PHYSICIAN ASSISTANT

## 2019-12-22 PROCEDURE — 94760 N-INVAS EAR/PLS OXIMETRY 1: CPT

## 2019-12-22 PROCEDURE — 36415 COLL VENOUS BLD VENIPUNCTURE: CPT

## 2019-12-22 PROCEDURE — 6370000000 HC RX 637 (ALT 250 FOR IP): Performed by: OPHTHALMOLOGY

## 2019-12-22 PROCEDURE — 82948 REAGENT STRIP/BLOOD GLUCOSE: CPT

## 2019-12-22 PROCEDURE — 93005 ELECTROCARDIOGRAM TRACING: CPT | Performed by: PHYSICIAN ASSISTANT

## 2019-12-22 PROCEDURE — 2140000000 HC CCU INTERMEDIATE R&B

## 2019-12-22 PROCEDURE — 2580000003 HC RX 258: Performed by: OPHTHALMOLOGY

## 2019-12-22 PROCEDURE — 0107U C DIFF TOX AG DETCJ IA STOOL: CPT

## 2019-12-22 RX ORDER — POTASSIUM CHLORIDE 20 MEQ/1
40 TABLET, EXTENDED RELEASE ORAL ONCE
Status: COMPLETED | OUTPATIENT
Start: 2019-12-22 | End: 2019-12-22

## 2019-12-22 RX ORDER — 0.9 % SODIUM CHLORIDE 0.9 %
500 INTRAVENOUS SOLUTION INTRAVENOUS ONCE
Status: DISCONTINUED | OUTPATIENT
Start: 2019-12-22 | End: 2019-12-26 | Stop reason: HOSPADM

## 2019-12-22 RX ADMIN — ESCITALOPRAM 10 MG: 10 TABLET, FILM COATED ORAL at 08:56

## 2019-12-22 RX ADMIN — ARIPIPRAZOLE 5 MG: 5 TABLET ORAL at 08:52

## 2019-12-22 RX ADMIN — CARVEDILOL 3.12 MG: 3.12 TABLET, FILM COATED ORAL at 08:52

## 2019-12-22 RX ADMIN — HEPARIN SODIUM 5000 UNITS: 5000 INJECTION INTRAVENOUS; SUBCUTANEOUS at 23:05

## 2019-12-22 RX ADMIN — HEPARIN SODIUM 5000 UNITS: 5000 INJECTION INTRAVENOUS; SUBCUTANEOUS at 06:20

## 2019-12-22 RX ADMIN — ACETAMINOPHEN 650 MG: 325 TABLET ORAL at 17:04

## 2019-12-22 RX ADMIN — PANTOPRAZOLE SODIUM 40 MG: 40 TABLET, DELAYED RELEASE ORAL at 08:52

## 2019-12-22 RX ADMIN — PIPERACILLIN AND TAZOBACTAM 3.38 G: 3; .375 INJECTION, POWDER, LYOPHILIZED, FOR SOLUTION INTRAVENOUS at 08:52

## 2019-12-22 RX ADMIN — CLOPIDOGREL BISULFATE 75 MG: 75 TABLET ORAL at 08:52

## 2019-12-22 RX ADMIN — ISOSORBIDE DINITRATE 10 MG: 10 TABLET ORAL at 19:37

## 2019-12-22 RX ADMIN — QUETIAPINE FUMARATE 500 MG: 300 TABLET, EXTENDED RELEASE ORAL at 19:38

## 2019-12-22 RX ADMIN — ATORVASTATIN CALCIUM 40 MG: 40 TABLET, FILM COATED ORAL at 08:52

## 2019-12-22 RX ADMIN — PIPERACILLIN AND TAZOBACTAM 3.38 G: 3; .375 INJECTION, POWDER, LYOPHILIZED, FOR SOLUTION INTRAVENOUS at 16:13

## 2019-12-22 RX ADMIN — ONDANSETRON 4 MG: 2 INJECTION INTRAMUSCULAR; INTRAVENOUS at 16:30

## 2019-12-22 RX ADMIN — ISOSORBIDE DINITRATE 10 MG: 10 TABLET ORAL at 08:52

## 2019-12-22 RX ADMIN — TIOTROPIUM BROMIDE 18 MCG: 18 CAPSULE ORAL; RESPIRATORY (INHALATION) at 09:28

## 2019-12-22 RX ADMIN — LEVOTHYROXINE SODIUM 75 MCG: 75 TABLET ORAL at 06:20

## 2019-12-22 RX ADMIN — Medication 1 PUFF: at 09:28

## 2019-12-22 RX ADMIN — POTASSIUM CHLORIDE 40 MEQ: 1500 TABLET, EXTENDED RELEASE ORAL at 08:52

## 2019-12-22 RX ADMIN — SODIUM CHLORIDE: 9 INJECTION, SOLUTION INTRAVENOUS at 16:13

## 2019-12-22 RX ADMIN — HEPARIN SODIUM 5000 UNITS: 5000 INJECTION INTRAVENOUS; SUBCUTANEOUS at 15:39

## 2019-12-22 RX ADMIN — ASPIRIN 81 MG 81 MG: 81 TABLET ORAL at 08:52

## 2019-12-22 NOTE — FLOWSHEET NOTE
12/22/19 0009   Provider Notification   Reason for Communication Evaluate  (chest pain, diaphoretic )   Provider Name Alice Palma    Provider Notification Physician Assistant   Method of Communication Secure Message   Response See orders   Notification Time 0009 0009 3B 23 admitted for severe sepsis and hypotension just started complaining of chest pain and is very diaphoretic. BP 95/51 HR 90. EKG ordered STAT.

## 2019-12-22 NOTE — PROGRESS NOTES
[facility]  OCCUPATIONAL THERAPY MISSED TREATMENT NOTE  [facility] CCU-STEPDOWN 3B  3B-23/023-A      Date: 2019  Patient Name: Birdie Roland        CSN: [id number]   : 1955  (60 y.o.)  Gender: female                REASON FOR MISSED TREATMENT: Nurse reports that Pt is having some low BP & is getting IV fluids no therapy at that this time. Will check back 19.

## 2019-12-22 NOTE — PROGRESS NOTES
mg Oral Daily    atorvastatin  40 mg Oral Daily    carvedilol  3.125 mg Oral BID WC    clopidogrel  75 mg Oral Daily    escitalopram  10 mg Oral Daily    isosorbide dinitrate  10 mg Oral TID    levothyroxine  75 mcg Oral Daily    pantoprazole  40 mg Oral Daily    QUEtiapine  500 mg Oral Nightly    piperacillin-tazobactam  3.375 g Intravenous Q8H    heparin (porcine)  5,000 Units Subcutaneous 3 times per day    tiotropium  18 mcg Inhalation Daily     PRN Meds: albuterol sulfate HFA, ondansetron, acetaminophen      Intake/Output Summary (Last 24 hours) at 12/22/2019 1729  Last data filed at 12/22/2019 1727  Gross per 24 hour   Intake 5042.83 ml   Output --   Net 5042.83 ml       Diet:  DIET CARDIAC; Exam:  BP (!) 92/52   Pulse 96   Temp 101.5 °F (38.6 °C) (Oral)   Resp 26   Ht 5' 4\" (1.626 m)   Wt 167 lb (75.8 kg)   SpO2 95%   BMI 28.67 kg/m²     General appearance: No apparent distress, appears stated age and cooperative. HEENT: Pupils equal, round, and reactive to light. Conjunctivae/corneas clear. Neck: Supple, with full range of motion. No jugular venous distention. Trachea midline. Respiratory:  Normal respiratory effort. Clear to auscultation, bilaterally without Rales/Wheezes/Rhonchi. Cardiovascular: Regular rate and rhythm with normal S1/S2 without murmurs, rubs or gallops. Abdomen: Soft, non-tender, non-distended with normal bowel sounds. Musculoskeletal: passive and active ROM x 4 extremities. Skin: Skin color, texture, turgor normal.    Neurologic:  Neurovascularly intact without any focal sensory/motor deficits.  Cranial nerves: II-XII intact, grossly non-focal.  Psychiatric: Alert and oriented, thought content appropriate  Capillary Refill: Brisk,< 3 seconds   Peripheral Pulses: +2 palpable, equal bilaterally       Labs:   Recent Labs     12/21/19 0139 12/22/19  0545   WBC 25.7* 15.8*   HGB 11.8* 9.5*   HCT 36.1* 28.7*    222     Recent Labs     12/21/19 0139 Dr. Crystal Chopra on 12/21/2019 5:43 AM      CT head without contrast   Final Result   . Negative CT of the brain. **This report has been created using voice recognition software. It may contain minor errors which are inherent in voice recognition technology. **      Final report electronically signed by Dr. Crystal Chopra on 12/21/2019 3:24 AM      XR CHEST PORTABLE   Final Result   . Rotated patient positioning. No acute appearing pathology of the chest otherwise suspected. **This report has been created using voice recognition software. It may contain minor errors which are inherent in voice recognition technology. **      Final report electronically signed by Dr. Crystal Chopra on 12/21/2019 2:14 AM          DVT prophylaxis: [] Lovenox                                 [] SCDs                                 [] SQ Heparin                                 [] Encourage ambulation           [] Already on Anticoagulation     Code Status: Prior    Tele:   [] yes             [] no    Active Hospital Problems    Diagnosis Date Noted    Elevated lactic acid level [R79.89] 12/21/2019    Acute renal failure with acute cortical necrosis ([address] [N17.1] 12/21/2019    Pyelonephritis of left kidney [N12] 12/21/2019    Pyelonephritis [N12] 12/21/2019    Septicemia ([address] [A41.9] 12/21/2019    Delirium [R41.0]        Electronically signed by Lolis Bliss DO on 12/22/2019 at 5:29 PM

## 2019-12-23 LAB
ANION GAP SERPL CALCULATED.3IONS-SCNC: 10 MEQ/L (ref 8–16)
BUN BLDV-MCNC: 11 MG/DL (ref 7–22)
CALCIUM SERPL-MCNC: 8.5 MG/DL (ref 8.5–10.5)
CHLORIDE BLD-SCNC: 102 MEQ/L (ref 98–111)
CO2: 17 MEQ/L (ref 23–33)
CREAT SERPL-MCNC: 0.8 MG/DL (ref 0.4–1.2)
ERYTHROCYTE [DISTWIDTH] IN BLOOD BY AUTOMATED COUNT: 14.9 % (ref 11.5–14.5)
ERYTHROCYTE [DISTWIDTH] IN BLOOD BY AUTOMATED COUNT: 46 FL (ref 35–45)
GFR SERPL CREATININE-BSD FRML MDRD: 73 ML/MIN/1.73M2
GLUCOSE BLD-MCNC: 103 MG/DL (ref 70–108)
GLUCOSE BLD-MCNC: 113 MG/DL (ref 70–108)
GLUCOSE BLD-MCNC: 115 MG/DL (ref 70–108)
GLUCOSE BLD-MCNC: 119 MG/DL (ref 70–108)
GLUCOSE BLD-MCNC: 133 MG/DL (ref 70–108)
HCT VFR BLD CALC: 27.3 % (ref 37–47)
HEMOGLOBIN: 9 GM/DL (ref 12–16)
MCH RBC QN AUTO: 27.9 PG (ref 26–33)
MCHC RBC AUTO-ENTMCNC: 33 GM/DL (ref 32.2–35.5)
MCV RBC AUTO: 84.5 FL (ref 81–99)
MRSA SCREEN: NORMAL
PLATELET # BLD: 183 THOU/MM3 (ref 130–400)
PMV BLD AUTO: 8.9 FL (ref 9.4–12.4)
POTASSIUM SERPL-SCNC: 3.2 MEQ/L (ref 3.5–5.2)
PROCALCITONIN: 4.94 NG/ML (ref 0.01–0.09)
RBC # BLD: 3.23 MILL/MM3 (ref 4.2–5.4)
SODIUM BLD-SCNC: 129 MEQ/L (ref 135–145)
WBC # BLD: 10.7 THOU/MM3 (ref 4.8–10.8)

## 2019-12-23 PROCEDURE — 99232 SBSQ HOSP IP/OBS MODERATE 35: CPT | Performed by: INTERNAL MEDICINE

## 2019-12-23 PROCEDURE — 80048 BASIC METABOLIC PNL TOTAL CA: CPT

## 2019-12-23 PROCEDURE — 97163 PT EVAL HIGH COMPLEX 45 MIN: CPT

## 2019-12-23 PROCEDURE — 94760 N-INVAS EAR/PLS OXIMETRY 1: CPT

## 2019-12-23 PROCEDURE — 84145 PROCALCITONIN (PCT): CPT

## 2019-12-23 PROCEDURE — 6370000000 HC RX 637 (ALT 250 FOR IP): Performed by: INTERNAL MEDICINE

## 2019-12-23 PROCEDURE — 6370000000 HC RX 637 (ALT 250 FOR IP): Performed by: PHYSICIAN ASSISTANT

## 2019-12-23 PROCEDURE — 94640 AIRWAY INHALATION TREATMENT: CPT

## 2019-12-23 PROCEDURE — 1200000000 HC SEMI PRIVATE

## 2019-12-23 PROCEDURE — 97530 THERAPEUTIC ACTIVITIES: CPT

## 2019-12-23 PROCEDURE — 2709999900 HC NON-CHARGEABLE SUPPLY

## 2019-12-23 PROCEDURE — 36415 COLL VENOUS BLD VENIPUNCTURE: CPT

## 2019-12-23 PROCEDURE — 97165 OT EVAL LOW COMPLEX 30 MIN: CPT

## 2019-12-23 PROCEDURE — 6370000000 HC RX 637 (ALT 250 FOR IP): Performed by: OPHTHALMOLOGY

## 2019-12-23 PROCEDURE — 6360000002 HC RX W HCPCS: Performed by: OPHTHALMOLOGY

## 2019-12-23 PROCEDURE — 2580000003 HC RX 258: Performed by: OPHTHALMOLOGY

## 2019-12-23 PROCEDURE — 97116 GAIT TRAINING THERAPY: CPT

## 2019-12-23 PROCEDURE — 2580000003 HC RX 258: Performed by: INTERNAL MEDICINE

## 2019-12-23 PROCEDURE — 85027 COMPLETE CBC AUTOMATED: CPT

## 2019-12-23 PROCEDURE — 82948 REAGENT STRIP/BLOOD GLUCOSE: CPT

## 2019-12-23 RX ORDER — POTASSIUM CHLORIDE 20 MEQ/1
20 TABLET, EXTENDED RELEASE ORAL
Status: COMPLETED | OUTPATIENT
Start: 2019-12-23 | End: 2019-12-24

## 2019-12-23 RX ORDER — POTASSIUM CHLORIDE 20 MEQ/1
40 TABLET, EXTENDED RELEASE ORAL ONCE
Status: COMPLETED | OUTPATIENT
Start: 2019-12-23 | End: 2019-12-23

## 2019-12-23 RX ADMIN — PIPERACILLIN AND TAZOBACTAM 3.38 G: 3; .375 INJECTION, POWDER, LYOPHILIZED, FOR SOLUTION INTRAVENOUS at 09:23

## 2019-12-23 RX ADMIN — ISOSORBIDE DINITRATE 10 MG: 10 TABLET ORAL at 16:07

## 2019-12-23 RX ADMIN — Medication 1 PUFF: at 23:08

## 2019-12-23 RX ADMIN — ATORVASTATIN CALCIUM 40 MG: 40 TABLET, FILM COATED ORAL at 09:23

## 2019-12-23 RX ADMIN — PIPERACILLIN AND TAZOBACTAM 3.38 G: 3; .375 INJECTION, POWDER, LYOPHILIZED, FOR SOLUTION INTRAVENOUS at 02:39

## 2019-12-23 RX ADMIN — POTASSIUM CHLORIDE 40 MEQ: 1500 TABLET, EXTENDED RELEASE ORAL at 06:07

## 2019-12-23 RX ADMIN — ISOSORBIDE DINITRATE 10 MG: 10 TABLET ORAL at 22:03

## 2019-12-23 RX ADMIN — ARIPIPRAZOLE 5 MG: 5 TABLET ORAL at 09:23

## 2019-12-23 RX ADMIN — ASPIRIN 81 MG 81 MG: 81 TABLET ORAL at 09:23

## 2019-12-23 RX ADMIN — QUETIAPINE FUMARATE 500 MG: 300 TABLET, EXTENDED RELEASE ORAL at 22:03

## 2019-12-23 RX ADMIN — TIOTROPIUM BROMIDE 18 MCG: 18 CAPSULE ORAL; RESPIRATORY (INHALATION) at 10:36

## 2019-12-23 RX ADMIN — CLOPIDOGREL BISULFATE 75 MG: 75 TABLET ORAL at 09:23

## 2019-12-23 RX ADMIN — ISOSORBIDE DINITRATE 10 MG: 10 TABLET ORAL at 09:23

## 2019-12-23 RX ADMIN — Medication 1 PUFF: at 03:11

## 2019-12-23 RX ADMIN — HEPARIN SODIUM 5000 UNITS: 5000 INJECTION INTRAVENOUS; SUBCUTANEOUS at 16:07

## 2019-12-23 RX ADMIN — POTASSIUM CHLORIDE 20 MEQ: 1500 TABLET, EXTENDED RELEASE ORAL at 16:16

## 2019-12-23 RX ADMIN — CARVEDILOL 3.12 MG: 3.12 TABLET, FILM COATED ORAL at 16:07

## 2019-12-23 RX ADMIN — HEPARIN SODIUM 5000 UNITS: 5000 INJECTION INTRAVENOUS; SUBCUTANEOUS at 06:10

## 2019-12-23 RX ADMIN — PANTOPRAZOLE SODIUM 40 MG: 40 TABLET, DELAYED RELEASE ORAL at 09:23

## 2019-12-23 RX ADMIN — CARVEDILOL 3.12 MG: 3.12 TABLET, FILM COATED ORAL at 09:23

## 2019-12-23 RX ADMIN — LEVOTHYROXINE SODIUM 75 MCG: 75 TABLET ORAL at 06:07

## 2019-12-23 RX ADMIN — PIPERACILLIN AND TAZOBACTAM 3.38 G: 3; .375 INJECTION, POWDER, LYOPHILIZED, FOR SOLUTION INTRAVENOUS at 16:08

## 2019-12-23 RX ADMIN — SODIUM CHLORIDE: 9 INJECTION, SOLUTION INTRAVENOUS at 02:38

## 2019-12-23 RX ADMIN — ESCITALOPRAM 10 MG: 10 TABLET, FILM COATED ORAL at 09:23

## 2019-12-23 RX ADMIN — HEPARIN SODIUM 5000 UNITS: 5000 INJECTION INTRAVENOUS; SUBCUTANEOUS at 22:03

## 2019-12-23 ASSESSMENT — PAIN DESCRIPTION - ONSET: ONSET: ON-GOING

## 2019-12-23 ASSESSMENT — PAIN DESCRIPTION - DESCRIPTORS: DESCRIPTORS: ACHING;SHARP

## 2019-12-23 ASSESSMENT — PAIN SCALES - GENERAL
PAINLEVEL_OUTOF10: 0
PAINLEVEL_OUTOF10: 3
PAINLEVEL_OUTOF10: 0

## 2019-12-23 ASSESSMENT — PAIN DESCRIPTION - ORIENTATION: ORIENTATION: ANTERIOR

## 2019-12-23 ASSESSMENT — PAIN DESCRIPTION - FREQUENCY: FREQUENCY: CONTINUOUS

## 2019-12-23 ASSESSMENT — PAIN DESCRIPTION - PROGRESSION: CLINICAL_PROGRESSION: NOT CHANGED

## 2019-12-23 ASSESSMENT — PAIN DESCRIPTION - PAIN TYPE: TYPE: CHRONIC PAIN

## 2019-12-23 ASSESSMENT — PAIN - FUNCTIONAL ASSESSMENT: PAIN_FUNCTIONAL_ASSESSMENT: ACTIVITIES ARE NOT PREVENTED

## 2019-12-23 ASSESSMENT — PAIN DESCRIPTION - LOCATION: LOCATION: HEAD

## 2019-12-23 NOTE — PROGRESS NOTES
[address]  INPATIENT OCCUPATIONAL THERAPY  [facility] CC[facility]-STEPDOWN 3B  EVALUATION    Time:   Time In: 1010  Time Out: 1040  Timed Code Treatment Minutes: 15 Minutes  Minutes: 30          Date: 2019  Patient Name: Leandra Uriostegui,   Gender: female      MRN: [id number]  : 1955  (60 y.o.)  Referring Practitioner: Dr. Rachelle Macias MD  Diagnosis: Pyelonephritis  Additional Pertinent Hx: Pt admitted with fever and vomiting. She had confusion. Pt is a resident of [facility]. Restrictions/Precautions:  Restrictions/Precautions: Fall Risk    Subjective  Chart Reviewed: Yes, Orders, History and Physical  Patient assessed for rehabilitation services?: Yes  Response to previous treatment: Patient with no complaints from previous session  Family / Caregiver Present: No    Subjective: Pleasant and cooperative  Comments: RN approved session. Pt asked to go to the bathroom. She had some diahrea this AM, per staff member. She reported having mild headache. She did ask for some tylenol and her nurse as notified. Pain:  Pain Assessment  Patient Currently in Pain: Yes  Pain Assessment: 0-10  Pain Level: 3  Pain Type: Chronic pain  Pain Location: Head  Pain Orientation: Anterior  Pain Descriptors: Aching; Sharp  Pain Frequency: Continuous  Clinical Progression: Not changed  Patient's Stated Pain Goal: No pain  Response to Pain Intervention: Patient Satisfied  Multiple Pain Sites: No    Social/Functional History:  Lives With: Alone  Type of Home: Assisted living  Home Layout: One level  Home Access: Level entry   Bathroom Shower/Tub: Shower chair with back, Walk-in shower  Bathroom Toilet: Standard  Bathroom Equipment: Grab bars in shower  Bathroom Accessibility: Accessible    Receives Help From: Family, Other (comment)  ADL Assistance: [address] [place] [address] Avenue: Needs assistance  Homemaking Responsibilities: Yes  Ambulation Assistance: Independent  Transfer Assistance: Impairments: Decreased functional mobility , Decreased endurance, Decreased cognition  Prognosis: Good  REQUIRES OT FOLLOW UP: Yes  Decision Making: Low Complexity  Safety Devices in place: Yes  Type of devices: Left in bed, Nurse notified, Call light within reach, Gait belt, Patient at risk for falls(Sitter in place)    Treatment Initiated: Treatment and education initiated within context of evaluation. Evaluation time included review of current medical information, gathering information related to past medical, social and functional history, completion of standardized testing, formal and informal observation of tasks, assessment of data and development of plan of care and goals. Treatment time included skilled education and facilitation of tasks to increase safety and independence with ADL's for improved functional independence and quality of life. Pt was able to orient to place with min cues but needed a reminder later in the session about the name of the facility. She could problem solve needing to request a family member to get her some pants so she can wear those when she is discharged. It was pointed out to her that she had some bowel on her hospital gown and she did agreed that she needed to change this. She agreed to work with therapy even when returning to her assisted living apartment. Discharge Recommendations:  Continue to assess pending progress, Patient would benefit from continued therapy after discharge    Patient Education:  OT Education: OT Role, Plan of Care, Orientation    Equipment Recommendations:  Equipment Needed: No    Plan:  Times per week: 3-5x  Current Treatment Recommendations: Cognitive Reorientation, Endurance Training, Self-Care / ADL, Functional Mobility Training  Plan Comment: Pt would benefit from continued OT when medically stable and discharged from Acute. OT recommended while pt is at Assisted Living.   Specific instructions for Next Treatment: Functional mobility; ADLs and standing endurance; upper body exercises    Goals:  Patient goals : \"I want to get back to the assisted living and do things on my own. \" pt states. Short term goals  Time Frame for Short term goals: By discharge  Short term goal 1: Pt will complete simple ADLs while standing for over 5 minute duration with supervision to increase her endurance for ease of bathing or dressing. Short term goal 2: Pt will complete BUE ROM/moderate resistance exercises with any handout and verbal cues for slow breathing as needed to increase her endurance for ease od doing self care or going shopping. Short term goal 3: Pt will demonstrate functional mobility walking in the hallway around obstacles while  using the least restrictive AD with supervision to prepare for doing any shopping. Short term goal 4: Pt will be oriented daily to situation, place and her goals with verbal cues as needed to increase her safety and facilitate progress toward her goals. Long term goals  Time Frame for Long term goals : None secondary to short estimated length of stay. Following session, patient left in safe position with all fall risk precautions in place.

## 2019-12-23 NOTE — PROGRESS NOTES
home      Medications:  Reviewed    Infusion Medications     Scheduled Medications    potassium chloride  20 mEq Oral Dinner    potassium replacement protocol   Other RX Placeholder    sodium chloride  500 mL Intravenous Once    ARIPiprazole  5 mg Oral Daily    aspirin  81 mg Oral Daily    atorvastatin  40 mg Oral Daily    carvedilol  3.125 mg Oral BID WC    clopidogrel  75 mg Oral Daily    escitalopram  10 mg Oral Daily    isosorbide dinitrate  10 mg Oral TID    levothyroxine  75 mcg Oral Daily    pantoprazole  40 mg Oral Daily    QUEtiapine  500 mg Oral Nightly    piperacillin-tazobactam  3.375 g Intravenous Q8H    heparin (porcine)  5,000 Units Subcutaneous 3 times per day    tiotropium  18 mcg Inhalation Daily     PRN Meds: albuterol sulfate HFA, ondansetron, acetaminophen      Intake/Output Summary (Last 24 hours) at 12/23/2019 1651  Last data filed at 12/23/2019 1341  Gross per 24 hour   Intake 5091.17 ml   Output --   Net 5091.17 ml       Diet:  DIET CARDIAC; Exam:  /63   Pulse 81   Temp 99 °F (37.2 °C) (Oral)   Resp 18   Ht 5' 4\" (1.626 m)   Wt 170 lb 4.8 oz (77.2 kg)   SpO2 93%   BMI 29.23 kg/m²     General appearance: No apparent distress, appears stated age and cooperative. HEENT: Pupils equal, round, and reactive to light. Conjunctivae/corneas clear. Neck: Supple, with full range of motion. No jugular venous distention. Trachea midline. Respiratory:  Normal respiratory effort. Clear to auscultation, bilaterally without Rales/Wheezes/Rhonchi. Cardiovascular: Regular rate and rhythm with normal S1/S2 without murmurs, rubs or gallops. Abdomen: Soft, non-tender, non-distended with normal bowel sounds. Musculoskeletal: passive and active ROM x 4 extremities. Skin: Skin color, texture, turgor normal.    Neurologic:  Neurovascularly intact without any focal sensory/motor deficits.  Cranial nerves: II-XII intact, grossly non-focal.  Psychiatric: Alert and oriented, thought

## 2019-12-23 NOTE — CARE COORDINATION
19, 7:02 AM  DISCHARGE PLANNING EVALUATION:    Trang Bhandari       Admitted from: ED 2019/ 0054 Hospital day: 2   Location: --A Reason for admit: Pyelonephritis [N12]  Sepsis ([address] [A41.9] Status: IP  Admit order signed?: yes  PMH:  has a past medical history of Acute renal failure with acute cortical necrosis ([address] Allergic rhinitis, Arthritis, Bipolar disorder ([address] Blood circulation, collateral, Cancer ([address] Colon polyps, COPD (chronic obstructive pulmonary disease) ([address] Coronary vasospasm ([address] Depression, Diverticulitis of colon, GERD (gastroesophageal reflux disease), Hiatal hernia, History of arterial ischemic stroke, History of colonoscopy, History of kidney stones, Hx of blood clots, Hyperlipidemia, Hypertension, Liver disease, Pneumonia, Suicidal thoughts, Thyroid disease, Type 2 diabetes mellitus without complication, without long-term current use of insulin ([address] Vomiting, Wears dentures, and Wears glasses. Procedure: None  Pertinent abnormal Imagin/21 CT Abd - compatible with nephritis.  CXR - mild left basilar atelectasis/pneumonia. Medications:  Scheduled Meds:   potassium replacement protocol   Other RX Placeholder    sodium chloride  500 mL Intravenous Once    ARIPiprazole  5 mg Oral Daily    aspirin  81 mg Oral Daily    atorvastatin  40 mg Oral Daily    carvedilol  3.125 mg Oral BID WC    clopidogrel  75 mg Oral Daily    escitalopram  10 mg Oral Daily    isosorbide dinitrate  10 mg Oral TID    levothyroxine  75 mcg Oral Daily    pantoprazole  40 mg Oral Daily    QUEtiapine  500 mg Oral Nightly    piperacillin-tazobactam  3.375 g Intravenous Q8H    heparin (porcine)  5,000 Units Subcutaneous 3 times per day    tiotropium  18 mcg Inhalation Daily     Continuous Infusions:   sodium chloride 100 mL/hr at 19 [id number]      Pertinent Info/Orders/Treatment Plan:  Pt admitted through ED with confusion, hypotension.  Pt was given fluid bolus and

## 2019-12-23 NOTE — PROGRESS NOTES
least every week or more. She did not use any AD for ambulating-discussed getting AD and pt stated she wouldn't use it     OBJECTIVE:  Range of Motion:  Bilateral Lower Extremity: WFL    Strength:  Bilateral Lower Extremity: WFL , generalized weakness    Balance:  Static Sitting Balance:  Modified Independent  Dynamic Sitting Balance: Modified Independent, reaching knee to shoulder level  Static Standing Balance: Supervision  Dynamic Standing Balance: Stand By Assistance, reaching waist to shoulder level OBOS    Bed Mobility:  Rolling to Left: Modified Independent   Rolling to Right: Modified Independent   Supine to Sit: Modified Independent  Sit to Supine: Modified Independent   Scooting: Modified Independent    Transfers:  Sit to Stand: Supervision  Stand to Sit:Supervision    Ambulation:  Stand By Assistance  Distance: 160'x1, 10'x1  Surface: Level Tile  Device:No Device  Gait Deviations:  Slow Marisela and min WBOS, min unsteady but no LOB, pt SOB after amb, pt occasionally reaching in hallway for 1UE support-per pt as PTA and refused use of AD        Functional Outcome Measures: Completed  -PAC Inpatient Mobility without Stair Climbing Raw Score : 17  AM-PAC Inpatient without Stair Climbing T-Scale Score : 48.47    ASSESSMENT:  Activity Tolerance:  Patient tolerance of  treatment: good. Treatment Initiated: Treatment and education initiated within context of evaluation. Evaluation time included review of current medical information, gathering information related to past medical, social and functional history, completion of standardized testing, formal and informal observation of tasks, assessment of data and development of plan of care and goals. Treatment time included skilled education and facilitation of tasks to increase safety and independence with functional mobility for improved independence and quality of life. Assessment:   Body structures, Functions, Activity limitations: Decreased functional mobility , Decreased endurance, Decreased strength, Decreased balance  Assessment: pt tolerated fair, pt easily SOB with inc distance amb, min generalized weakness compared to baseline, IV lines, heart monitor, inc assist for safe mobility, recommend cont PT to inc pt I with functional mobility  Prognosis: Good    REQUIRES PT FOLLOW UP: Yes    Discharge Recommendations:  Discharge Recommendations: Continue to assess pending progress(plans return to assisted living with PT as PTA)    Patient Education:  PT Education: Plan of Care, Goals, Functional Mobility Training    Equipment Recommendations:  Equipment Needed: No  Other: pt refused use of cane or walker    Plan:  Times per week: 3-5X GM  Times per day: Daily  Specific instructions for Next Treatment: therex and mobility    Goals:  Patient goals : return to assisted living  Short term goals  Time Frame for Short term goals: by discharge  Short term goal 1: transfer with MOD I  Short term goal 2: amb 150'x1 without AD and MOD I to walk safely to dining brito at [facility]. Long term goals  Time Frame for Long term goals : no LTGs set secondary to short ELOS    Following session, patient left in safe position with all fall risk precautions in place.

## 2019-12-24 LAB
ANION GAP SERPL CALCULATED.3IONS-SCNC: 14 MEQ/L (ref 8–16)
BUN BLDV-MCNC: 7 MG/DL (ref 7–22)
CALCIUM SERPL-MCNC: 9 MG/DL (ref 8.5–10.5)
CHLORIDE BLD-SCNC: 102 MEQ/L (ref 98–111)
CO2: 17 MEQ/L (ref 23–33)
CREAT SERPL-MCNC: 0.9 MG/DL (ref 0.4–1.2)
ERYTHROCYTE [DISTWIDTH] IN BLOOD BY AUTOMATED COUNT: 15.4 % (ref 11.5–14.5)
ERYTHROCYTE [DISTWIDTH] IN BLOOD BY AUTOMATED COUNT: 47.7 FL (ref 35–45)
GFR SERPL CREATININE-BSD FRML MDRD: 63 ML/MIN/1.73M2
GLUCOSE BLD-MCNC: 122 MG/DL (ref 70–108)
GLUCOSE BLD-MCNC: 92 MG/DL (ref 70–108)
HCT VFR BLD CALC: 29.1 % (ref 37–47)
HEMOGLOBIN: 9.3 GM/DL (ref 12–16)
MAGNESIUM: 1.4 MG/DL (ref 1.6–2.4)
MCH RBC QN AUTO: 27.1 PG (ref 26–33)
MCHC RBC AUTO-ENTMCNC: 32 GM/DL (ref 32.2–35.5)
MCV RBC AUTO: 84.8 FL (ref 81–99)
PLATELET # BLD: 230 THOU/MM3 (ref 130–400)
PMV BLD AUTO: 9.1 FL (ref 9.4–12.4)
POTASSIUM SERPL-SCNC: 3.6 MEQ/L (ref 3.5–5.2)
RBC # BLD: 3.43 MILL/MM3 (ref 4.2–5.4)
SODIUM BLD-SCNC: 133 MEQ/L (ref 135–145)
WBC # BLD: 10.7 THOU/MM3 (ref 4.8–10.8)

## 2019-12-24 PROCEDURE — 6370000000 HC RX 637 (ALT 250 FOR IP): Performed by: FAMILY MEDICINE

## 2019-12-24 PROCEDURE — 83735 ASSAY OF MAGNESIUM: CPT

## 2019-12-24 PROCEDURE — 2580000003 HC RX 258: Performed by: OPHTHALMOLOGY

## 2019-12-24 PROCEDURE — 97116 GAIT TRAINING THERAPY: CPT

## 2019-12-24 PROCEDURE — 6370000000 HC RX 637 (ALT 250 FOR IP): Performed by: OPHTHALMOLOGY

## 2019-12-24 PROCEDURE — 82948 REAGENT STRIP/BLOOD GLUCOSE: CPT

## 2019-12-24 PROCEDURE — 1200000000 HC SEMI PRIVATE

## 2019-12-24 PROCEDURE — 6360000002 HC RX W HCPCS: Performed by: OPHTHALMOLOGY

## 2019-12-24 PROCEDURE — 36415 COLL VENOUS BLD VENIPUNCTURE: CPT

## 2019-12-24 PROCEDURE — 85027 COMPLETE CBC AUTOMATED: CPT

## 2019-12-24 PROCEDURE — 99239 HOSP IP/OBS DSCHRG MGMT >30: CPT | Performed by: INTERNAL MEDICINE

## 2019-12-24 PROCEDURE — 94640 AIRWAY INHALATION TREATMENT: CPT

## 2019-12-24 PROCEDURE — 6370000000 HC RX 637 (ALT 250 FOR IP): Performed by: INTERNAL MEDICINE

## 2019-12-24 PROCEDURE — 80048 BASIC METABOLIC PNL TOTAL CA: CPT

## 2019-12-24 RX ORDER — SULFAMETHOXAZOLE AND TRIMETHOPRIM 800; 160 MG/1; MG/1
1 TABLET ORAL 2 TIMES DAILY
Qty: 8 TABLET | Refills: 0 | Status: SHIPPED | OUTPATIENT
Start: 2019-12-24 | End: 2019-12-28

## 2019-12-24 RX ORDER — FOLIC ACID 1 MG/1
1 TABLET ORAL DAILY
COMMUNITY

## 2019-12-24 RX ORDER — LANOLIN ALCOHOL/MO/W.PET/CERES
500 CREAM (GRAM) TOPICAL NIGHTLY
Status: ON HOLD | COMMUNITY
End: 2019-12-25 | Stop reason: HOSPADM

## 2019-12-24 RX ORDER — ALBUTEROL SULFATE 2.5 MG/3ML
2.5 SOLUTION RESPIRATORY (INHALATION) EVERY 6 HOURS PRN
Status: ON HOLD | COMMUNITY
End: 2019-12-25 | Stop reason: HOSPADM

## 2019-12-24 RX ORDER — ARIPIPRAZOLE 2 MG/1
2 TABLET ORAL DAILY
COMMUNITY

## 2019-12-24 RX ORDER — NAPROXEN 500 MG/1
500 TABLET ORAL 2 TIMES DAILY WITH MEALS
Status: ON HOLD | COMMUNITY
End: 2019-12-25 | Stop reason: HOSPADM

## 2019-12-24 RX ORDER — HYDROXYZINE PAMOATE 50 MG/1
50 CAPSULE ORAL 3 TIMES DAILY PRN
Status: DISCONTINUED | OUTPATIENT
Start: 2019-12-24 | End: 2019-12-26 | Stop reason: HOSPADM

## 2019-12-24 RX ADMIN — CLOPIDOGREL BISULFATE 75 MG: 75 TABLET ORAL at 08:32

## 2019-12-24 RX ADMIN — PANTOPRAZOLE SODIUM 40 MG: 40 TABLET, DELAYED RELEASE ORAL at 08:32

## 2019-12-24 RX ADMIN — HEPARIN SODIUM 5000 UNITS: 5000 INJECTION INTRAVENOUS; SUBCUTANEOUS at 21:23

## 2019-12-24 RX ADMIN — PIPERACILLIN AND TAZOBACTAM 3.38 G: 3; .375 INJECTION, POWDER, LYOPHILIZED, FOR SOLUTION INTRAVENOUS at 00:00

## 2019-12-24 RX ADMIN — ACETAMINOPHEN 650 MG: 325 TABLET ORAL at 08:32

## 2019-12-24 RX ADMIN — CARVEDILOL 3.12 MG: 3.12 TABLET, FILM COATED ORAL at 17:29

## 2019-12-24 RX ADMIN — TIOTROPIUM BROMIDE 18 MCG: 18 CAPSULE ORAL; RESPIRATORY (INHALATION) at 07:43

## 2019-12-24 RX ADMIN — ARIPIPRAZOLE 5 MG: 5 TABLET ORAL at 08:34

## 2019-12-24 RX ADMIN — PIPERACILLIN AND TAZOBACTAM 3.38 G: 3; .375 INJECTION, POWDER, LYOPHILIZED, FOR SOLUTION INTRAVENOUS at 17:29

## 2019-12-24 RX ADMIN — HEPARIN SODIUM 5000 UNITS: 5000 INJECTION INTRAVENOUS; SUBCUTANEOUS at 17:29

## 2019-12-24 RX ADMIN — ASPIRIN 81 MG 81 MG: 81 TABLET ORAL at 08:32

## 2019-12-24 RX ADMIN — POTASSIUM CHLORIDE 20 MEQ: 1500 TABLET, EXTENDED RELEASE ORAL at 17:37

## 2019-12-24 RX ADMIN — QUETIAPINE FUMARATE 500 MG: 300 TABLET, EXTENDED RELEASE ORAL at 21:31

## 2019-12-24 RX ADMIN — ATORVASTATIN CALCIUM 40 MG: 40 TABLET, FILM COATED ORAL at 08:35

## 2019-12-24 RX ADMIN — CARVEDILOL 3.12 MG: 3.12 TABLET, FILM COATED ORAL at 08:34

## 2019-12-24 RX ADMIN — ISOSORBIDE DINITRATE 10 MG: 10 TABLET ORAL at 20:38

## 2019-12-24 RX ADMIN — ESCITALOPRAM 10 MG: 10 TABLET, FILM COATED ORAL at 08:34

## 2019-12-24 RX ADMIN — LEVOTHYROXINE SODIUM 75 MCG: 75 TABLET ORAL at 05:39

## 2019-12-24 RX ADMIN — HEPARIN SODIUM 5000 UNITS: 5000 INJECTION INTRAVENOUS; SUBCUTANEOUS at 05:38

## 2019-12-24 RX ADMIN — HYDROXYZINE PAMOATE 50 MG: 50 CAPSULE ORAL at 22:42

## 2019-12-24 RX ADMIN — ISOSORBIDE DINITRATE 10 MG: 10 TABLET ORAL at 08:34

## 2019-12-24 RX ADMIN — PIPERACILLIN AND TAZOBACTAM 3.38 G: 3; .375 INJECTION, POWDER, LYOPHILIZED, FOR SOLUTION INTRAVENOUS at 08:33

## 2019-12-24 RX ADMIN — ISOSORBIDE DINITRATE 10 MG: 10 TABLET ORAL at 17:29

## 2019-12-24 ASSESSMENT — PAIN SCALES - GENERAL
PAINLEVEL_OUTOF10: 0

## 2019-12-24 NOTE — DISCHARGE SUMMARY
Discharge Summary     Patient Identification:  Laine Meier  : 1955  MRN: [id number]   Account: [de-identified]     Admit date: 2019  Discharge date: 19   Attending provider: Karena Villatoro DO        Primary care provider: Marissa Gutierrez MD     Discharge Diagnoses:   Principal Problem:    Pyelonephritis of left kidney  Active Problems:    Delirium    Elevated lactic acid level    Acute renal failure with acute cortical necrosis (HCC)    Pyelonephritis    Septicemia ([address]  Resolved Problems:    * No resolved hospital problems. *    Assessment/Plan:     1. Sepsis due to pyelonephritis of the left kidney - briefly required pressor support but now stable off; c/w zosyn for now and follow cultures; recheck PCT is only slightly better - given pyelonephritis and severity of infection patient will require longer IV antibiotics; we can consider ID consultation tomorrow to help determine duration of antibiotics  2. HTN - with recent hypotension due to above, holding parameters on medications  3. COPD - c/w home spiriva  4. Depression - c/w home meds  5. DM2 - c/w home meds and monitor           Expected discharge date:  ?     Disposition:      [x]? Home                             []? TCU                             []? Rehab                             []? Psych                             []? SNF                             []? [facility]                             []? Other-     Chief Complaint: AMS, hypotension     Hospital Course: from prior note: \"Elli Coulter is a 60 y. o. female who presents to Emergency Department with altered mental status.     Patient was confused in the ER and hypotensive, didn't respond to fluid bolus, right SC line was placed and she was started on Levophed briefly which was weaned down once she came to the ICU. She was later transferred to the floor.  Per EMS note, assisted-living staff states that patient was not acting right and vomiting yesterday. spray by Each Nostril route daily             hydrochlorothiazide (HYDRODIURIL) 25 MG tablet  TAKE 1 TABLET BY MOUTH TWICE DAILY             hydrOXYzine (VISTARIL) 50 MG capsule  Take 50 mg by mouth 3 times daily as needed for Itching or Anxiety              isosorbide dinitrate (ISORDIL) 10 MG tablet  TAKE 1 TABLET BY MOUTH 3 TIMES DAILY             levothyroxine (SYNTHROID) 75 MCG tablet  Take 75 mcg by mouth Six times weekly everyday except Sunday take 150 mcg.             lipase-protease-amylase (ZENPEP) 5000 units delayed release capsule  Take 2 capsules by mouth 3 times daily (with meals) And 1 capsule with snacks             nystatin (MYCOSTATIN) POWD powder  Apply 1 each topically 2 times daily             pantoprazole (PROTONIX) 40 MG tablet  Take 1 tablet by mouth daily Indications: Gastroesophageal Reflux Disease             QUEtiapine (SEROQUEL XR) 300 MG extended release tablet  Take 2 tablets by mouth nightly             SPIRIVA HANDIHALER 18 MCG inhalation capsule               sulfamethoxazole-trimethoprim (BACTRIM DS;SEPTRA DS) 800-160 MG per tablet  Take 1 tablet by mouth 2 times daily for 4 days             topiramate (TOPAMAX) 50 MG tablet  Topamax 25 mg tablet daily for one week then 50 mg daily there after. Take with plenty of fluids.              traZODone (DESYREL) 100 MG tablet  Take 2 tablets by mouth nightly             TUDORZA PRESSAIR 400 MCG/ACT AEPB inhaler  Inhale 1 puff into the lungs 2 times daily                 Patient Instructions:    Discharge lab work: no  Activity: activity as tolerated  Diet: DIET CARDIAC;    Code Status: Prior    Follow-up visits:   Jenna Zuniga MD  [facility], [address] [place] [address]  [phone]             Procedures: no    Consults:   IP CONSULT TO SOCIAL WORK  IP CONSULT TO INFECTIOUS DISEASES    Examination:  Vitals:  Vitals:    12/23/19 1544 12/23/19 2145 12/23/19 2252 12/24/19 0815   BP: 117/63 (!) 153/71 124/78 (!) 138/98   Pulse: 81 95 86 92 electronically signed by Dr. Corona Issa on 12/21/2019 3:24 AM    Ct Chest W Contrast    Result Date: 12/21/2019  PROCEDURE: CT CHEST W CONTRAST CLINICAL INFORMATION: fever, sepsis, Trauma. COMPARISON: May 28, 2018 TECHNIQUE: 5 mm axial images were obtained through the chest after the administration of intravenous contrast. Sagittal and coronal reconstructions were obtained. All CT scans at this facility use dose modulation, iterative reconstruction, and/or weight-based dosing when appropriate to reduce radiation dose to as low as reasonably achievable. FINDINGS: The aortic arch and great vessel configuration are within normal limits. The mediastinum is negative for pathologic adenopathy changes. There is an azygos fissure present. There is minimal subsegmental atelectasis in the posterior lung bases. There is minimal left effusion. Degenerative changes of the spine are present. The proximal abdominal soft tissues demonstrate a small hiatal hernia. There is a ill-defined hypodensity in the lateral left lobe the liver measures 10 mm. A small cyst is suspected. 1. Minimal basilar atelectasis changes. No other acute abnormality of the chest present. **This report has been created using voice recognition software. It may contain minor errors which are inherent in voice recognition technology. ** Final report electronically signed by Dr. Corona Issa on 12/21/2019 5:40 AM    Ct Abdomen Pelvis W Iv Contrast Additional Contrast? None    Result Date: 12/21/2019  PROCEDURE: CT ABDOMEN PELVIS W IV CONTRAST CLINICAL INFORMATION: sepsis . COMPARISON: July 22, 2019 TECHNIQUE: 5 mm axial CT images were obtained through the abdomen and pelvis after the administration of intravenous and oral contrast. Coronal and sagittal reconstructions were obtained.  All CT scans at this facility use dose modulation, iterative reconstruction, and/or weight-based dosing when appropriate to reduce radiation dose to as low as reasonably Result Value Ref Range    Procalcitonin 4.94 (H) 0.01 - 0.09 ng/mL   Anion Gap    Collection Time: 12/23/19  3:47 AM   Result Value Ref Range    Anion Gap 10.0 8.0 - 16.0 meq/L   Glomerular Filtration Rate, Estimated    Collection Time: 12/23/19  3:47 AM   Result Value Ref Range    Est, Glom Filt Rate 73 (A) ml/min/1.73m2   POCT Glucose    Collection Time: 12/23/19  5:37 AM   Result Value Ref Range    POC Glucose 115 (H) 70 - 108 mg/dl   POCT Glucose    Collection Time: 12/23/19 11:08 AM   Result Value Ref Range    POC Glucose 103 70 - 108 mg/dl   POCT Glucose    Collection Time: 12/23/19  3:43 PM   Result Value Ref Range    POC Glucose 113 (H) 70 - 108 mg/dl   POCT Glucose    Collection Time: 12/23/19  7:45 PM   Result Value Ref Range    POC Glucose 133 (H) 70 - 108 mg/dl   Basic Metabolic Panel    Collection Time: 12/24/19  5:53 AM   Result Value Ref Range    Sodium 133 (L) 135 - 145 meq/L    Potassium 3.6 3.5 - 5.2 meq/L    Chloride 102 98 - 111 meq/L    CO2 17 (L) 23 - 33 meq/L    Glucose 92 70 - 108 mg/dL    BUN 7 7 - 22 mg/dL    CREATININE 0.9 0.4 - 1.2 mg/dL    Calcium 9.0 8.5 - 10.5 mg/dL   CBC    Collection Time: 12/24/19  5:53 AM   Result Value Ref Range    WBC 10.7 4.8 - 10.8 thou/mm3    RBC 3.43 (L) 4.20 - 5.40 mill/mm3    Hemoglobin 9.3 (L) 12.0 - 16.0 gm/dl    Hematocrit 29.1 (L) 37.0 - 47.0 %    MCV 84.8 81.0 - 99.0 fL    MCH 27.1 26.0 - 33.0 pg    MCHC 32.0 (L) 32.2 - 35.5 gm/dl    RDW-CV 15.4 (H) 11.5 - 14.5 %    RDW-SD 47.7 (H) 35.0 - 45.0 fL    Platelets 056 006 - 530 thou/mm3    MPV 9.1 (L) 9.4 - 12.4 fL   Magnesium    Collection Time: 12/24/19  5:53 AM   Result Value Ref Range    Magnesium 1.4 (L) 1.6 - 2.4 mg/dL   Anion Gap    Collection Time: 12/24/19  5:53 AM   Result Value Ref Range    Anion Gap 14.0 8.0 - 16.0 meq/L   Glomerular Filtration Rate, Estimated    Collection Time: 12/24/19  5:53 AM   Result Value Ref Range    Est, Glom Filt Rate 63 (A) ml/min/1.73m2   POCT Glucose    Collection Time: 12/24/19  7:46 AM   Result Value Ref Range    POC Glucose 122 (H) 70 - 108 mg/dl       Discharge condition: good  Disposition: Home  Time spent on discharge: 40 minutes    Electronically signed by Raghu Michaels DO on 12/24/2019 at 12:39 PM

## 2019-12-24 NOTE — CONSULTS
CONSULTATION NOTE :ID       Patient - Patricia Dickinson,  Age - 60 y.o.    - 1955      Room Number - 7K-25/025-A   MRN -  [id number]   Acct # - [de-identified]  Date of Admission -  2019 12:54 AM  Patient's PCP: Rachell Wyman MD     Requesting Physician: Gaurang Delgado DO    REASON FOR CONSULTATION   Pyelonephritis assist with antibiotics. HISTORY OF PRESENT ILLNESS       This is a very pleasant 60 y.o. female who was admitted to the hospital with a chief complaints of change in mental state has been on treatment for sepsis related to pyelonephritis. On admission she had hypotension required pressors briefly and was started on IV Zosyn. I was asked to assist with her treatment. She had previous history of urinary tract infection. Her CT scan of the abdomen and pelvis shows pyelonephritis, there is no obstruction or stone. She is from assisted living she had fever on admission as high as 101.2 with leukocytosis lactic acidosis and dehydration. When I saw her she is feeling better. She denies any cough or chest pain, she has no any abdominal pain no hematuria.     PAST MEDICAL  HISTORY       Past Medical History:   Diagnosis Date    Acute renal failure with acute cortical necrosis (HCC) 2019    Allergic rhinitis     Arthritis     spine    Bipolar disorder ([address]     Blood circulation, collateral     Cancer ([address]     cancerous polyps removed - Dr. Max Young Colon polyps     COPD (chronic obstructive pulmonary disease) ([address] 2014    Coronary vasospasm ([address] 2019    Depression     Diverticulitis of colon     GERD (gastroesophageal reflux disease)     Hiatal hernia     History of arterial ischemic stroke 2019    History of colonoscopy 2002    History of kidney stones     Hx of blood clots 2014    PE and collar bone area after shoulder surgery    Hyperlipidemia     Hypertension     Liver disease     enlarged liver - damaged with alcohol in past but no cirrhosis per patient    Pneumonia 7/24/2014    Suicidal thoughts     2015 admitted to [address]E from [address] Thyroid disease     Type 2 diabetes mellitus without complication, without long-term current use of insulin ([facility] 7/20/2019    Vomiting     Wears dentures     Wears glasses        PAST SURGICAL HISTORY     Past Surgical History:   Procedure Laterality Date    ABDOMEN SURGERY      x4 removed cysts and ovary removed    CARDIAC SURGERY      heart caths, no stents    CARPAL TUNNEL RELEASE Bilateral 2016    CHOLECYSTECTOMY, LAPAROSCOPIC  6/12/15    Dr. Dorene Manzanares, ESOPHAGUS      ELBOW SURGERY Right 10/7/2004    Dr. Bill Mckeon Bilateral 2016    decompression   [address]    Dr. Isac Back with [facility]    ROTATOR CUFF REPAIR  2004, 2014    right shoulder    SHOULDER SURGERY  2014    right    SKIN BIOPSY  2006    under left eye         MEDICATIONS:       Scheduled Meds:   potassium chloride  20 mEq Oral Dinner    potassium replacement protocol   Other RX Placeholder    sodium chloride  500 mL Intravenous Once    ARIPiprazole  5 mg Oral Daily    aspirin  81 mg Oral Daily    atorvastatin  40 mg Oral Daily    carvedilol  3.125 mg Oral BID WC    clopidogrel  75 mg Oral Daily    escitalopram  10 mg Oral Daily    isosorbide dinitrate  10 mg Oral TID    levothyroxine  75 mcg Oral Daily    pantoprazole  40 mg Oral Daily    QUEtiapine  500 mg Oral Nightly    piperacillin-tazobactam  3.375 g Intravenous Q8H    heparin (porcine)  5,000 Units Subcutaneous 3 times per day    tiotropium  18 mcg Inhalation Daily     Continuous Infusions:  PRN Meds:albuterol sulfate HFA, ondansetron, acetaminophen  Allergies:   ALLERGIES:    Seasonal        SOCIAL HISTORY:     TOBACCO:   reports that she has been smoking cigarettes. She started smoking about 42 years ago.  She has a 15.00 ([facility] J44.9    Hypoglycemia E16.2    Tobacco use disorder F17.200    Anticoagulated on Coumadin Z79.01    Bipolar disorder ([facility]) F31.9    Cannabis abuse F12.10    Cocaine abuse ([facility]) F14.10    Alcohol dependence in remission ([facility] F10.21    Cholecystitis with cholelithiasis K80.10    GI bleed K92.2    Vomiting R11.10    Erosive esophagitis K22.10    Hypokalemia E87.6    HTN (hypertension), benign I10    Bipolar 1 disorder ([facility]) F31.9    Chronic pain G89.29    Hiatal hernia K44.9    Chest pain R07.9    Vomiting R11.10    Erosive gastritis K29.60    H pylori ulcer K27.9, B96.81    Hematemesis K92.0    History of Helicobacter pylori infection Z86.19    Intractable abdominal pain R10.9    Intractable vomiting with nausea R11.2    Epigastric abdominal pain R10.13    Acute blood loss anemia D62    Chronic chest pain R07.9, G89.29    Hyponatremia R05.8    Metabolic acidosis S85.7    Essential hypertension I10    COPD with acute exacerbation ([facility]) J44.1    Hypothyroidism E03.9    History of DVT (deep vein thrombosis) Z86.718    Depression F32.9    Tobacco abuse Z72.0    Hematemesis with nausea K92.0    Hypoxia R09.02    Pleural effusion, bilateral J90    Suicidal ideation R45.851    Bipolar disorder, in partial remission, most recent episode depressed ([facility]) F31.75    Bipolar II disorder ([facility] V29.62    Diastolic dysfunction K42.71    Angina, class II ([facility]) I20.9    Unstable angina ([facility]) I20.0    Dyslipidemia E78.5    Electrolyte abnormality E87.8    COPD exacerbation ([facility]) J44.1    Acute respiratory insufficiency R06.89    Chronic diastolic heart failure ([facility]) I50.32    Tobacco use disorder F17.200    Nasal septal deviation J34.2    Hypertrophy of left inferior nasal turbinate J34.3    Rhinogenic headache R51    Asymmetrical sensorineural hearing loss H90.5    Tinnitus, left ear H93.12    Psychosis ([facility]) F29    Substance-induced psychotic disorder ([facility]) F19.959    Bipolar 1 disorder, depressed ([facility] F31.9    Polysubstance abuse ([facility] F19.10    Major depressive disorder, recurrent ([facility] F33.9    Stroke-like symptom R29.90    Right arm weakness R29.898    Delirium R41.0    Paresthesias R20.2    Depression, major, recurrent ([facility]) F33.9    Depression with suicidal ideation F32.9, R45.851    Amnesia R41.3    Bipolar disorder, current episode mixed, moderate ([facility]) F31.62    Moidkusrd-Kinhiue-Cqjswd disease M22.40    Carpal tunnel syndrome of left wrist G56.02    Change of, skin texture R23.4    Chronic midline low back pain without sciatica M54.5, G89.29    Coronary vasospasm ([facility]) I20.1    Derangement of knee M23.90    Disorder associated with Helicobacter species R93.08    Disturbance of skin sensation R20.9    Encounter for surgical follow-up care Z48.89    Endometriosis N80.9    Environmental and seasonal allergies J30.89    Glaucoma suspect H40.009    History of arterial ischemic stroke Z86.73    Mixed hyperlipidemia E78.2    Large liver R16.0    Lesion of ulnar nerve G56.20    Nasal congestion R09.81    Nicotine dependence F17.200    Pancreatic insufficiency K86.89    Primary osteoarthritis involving multiple joints M15.0    Sciatica M54.30    Sprain of right foot S93.601A    Preoperative state [id number]    Type 2 diabetes mellitus without complication, without long-term current use of insulin ([facility]) E11.9    Urinary incontinence, overflow N39.490    Elevated lactic acid level R79.89    Acute renal failure with acute cortical necrosis ([facility]) N17.1    Pyelonephritis of left kidney N12    Pyelonephritis N12    Septicemia ([facility]) A41.9           Impression and Recommendation:   Sepsis due to pyelonephritis: Clinically she is improving. Her blood culture is negative. She had previous history of UTI due to E. coli which was resistant to Cipro.   Patient can be discharged with oral Bactrim for 4 days to complete a one-week treatment    Infection

## 2019-12-24 NOTE — PROGRESS NOTES
If patient gets discharged on 12/25/19Harry Matos needs called at [phone] so he can go in to do her admission back.

## 2019-12-25 LAB
GLUCOSE BLD-MCNC: 90 MG/DL (ref 70–108)
PROCALCITONIN: 1.53 NG/ML (ref 0.01–0.09)

## 2019-12-25 PROCEDURE — 6360000002 HC RX W HCPCS: Performed by: OPHTHALMOLOGY

## 2019-12-25 PROCEDURE — 2580000003 HC RX 258: Performed by: OPHTHALMOLOGY

## 2019-12-25 PROCEDURE — 1200000000 HC SEMI PRIVATE

## 2019-12-25 PROCEDURE — 6370000000 HC RX 637 (ALT 250 FOR IP): Performed by: NURSE PRACTITIONER

## 2019-12-25 PROCEDURE — 94640 AIRWAY INHALATION TREATMENT: CPT

## 2019-12-25 PROCEDURE — 36415 COLL VENOUS BLD VENIPUNCTURE: CPT

## 2019-12-25 PROCEDURE — 82948 REAGENT STRIP/BLOOD GLUCOSE: CPT

## 2019-12-25 PROCEDURE — 99232 SBSQ HOSP IP/OBS MODERATE 35: CPT | Performed by: NURSE PRACTITIONER

## 2019-12-25 PROCEDURE — 6370000000 HC RX 637 (ALT 250 FOR IP): Performed by: FAMILY MEDICINE

## 2019-12-25 PROCEDURE — 94760 N-INVAS EAR/PLS OXIMETRY 1: CPT

## 2019-12-25 PROCEDURE — 6370000000 HC RX 637 (ALT 250 FOR IP): Performed by: OPHTHALMOLOGY

## 2019-12-25 PROCEDURE — 84145 PROCALCITONIN (PCT): CPT

## 2019-12-25 RX ORDER — SULFAMETHOXAZOLE AND TRIMETHOPRIM 800; 160 MG/1; MG/1
1 TABLET ORAL EVERY 12 HOURS SCHEDULED
Status: DISCONTINUED | OUTPATIENT
Start: 2019-12-25 | End: 2019-12-26 | Stop reason: HOSPADM

## 2019-12-25 RX ADMIN — Medication 1 PUFF: at 12:30

## 2019-12-25 RX ADMIN — PIPERACILLIN AND TAZOBACTAM 3.38 G: 3; .375 INJECTION, POWDER, LYOPHILIZED, FOR SOLUTION INTRAVENOUS at 00:08

## 2019-12-25 RX ADMIN — PIPERACILLIN AND TAZOBACTAM 3.38 G: 3; .375 INJECTION, POWDER, LYOPHILIZED, FOR SOLUTION INTRAVENOUS at 10:22

## 2019-12-25 RX ADMIN — ISOSORBIDE DINITRATE 10 MG: 10 TABLET ORAL at 13:42

## 2019-12-25 RX ADMIN — LEVOTHYROXINE SODIUM 75 MCG: 75 TABLET ORAL at 06:16

## 2019-12-25 RX ADMIN — ACETAMINOPHEN 650 MG: 325 TABLET ORAL at 17:57

## 2019-12-25 RX ADMIN — SULFAMETHOXAZOLE AND TRIMETHOPRIM 1 TABLET: 800; 160 TABLET ORAL at 20:51

## 2019-12-25 RX ADMIN — CARVEDILOL 3.12 MG: 3.12 TABLET, FILM COATED ORAL at 10:23

## 2019-12-25 RX ADMIN — HYDROXYZINE PAMOATE 50 MG: 50 CAPSULE ORAL at 20:52

## 2019-12-25 RX ADMIN — HEPARIN SODIUM 5000 UNITS: 5000 INJECTION INTRAVENOUS; SUBCUTANEOUS at 06:07

## 2019-12-25 RX ADMIN — PANTOPRAZOLE SODIUM 40 MG: 40 TABLET, DELAYED RELEASE ORAL at 10:22

## 2019-12-25 RX ADMIN — HYDROXYZINE PAMOATE 50 MG: 50 CAPSULE ORAL at 12:04

## 2019-12-25 RX ADMIN — ACETAMINOPHEN 650 MG: 325 TABLET ORAL at 03:15

## 2019-12-25 RX ADMIN — ARIPIPRAZOLE 5 MG: 5 TABLET ORAL at 10:23

## 2019-12-25 RX ADMIN — ISOSORBIDE DINITRATE 10 MG: 10 TABLET ORAL at 10:22

## 2019-12-25 RX ADMIN — TIOTROPIUM BROMIDE 18 MCG: 18 CAPSULE ORAL; RESPIRATORY (INHALATION) at 06:23

## 2019-12-25 RX ADMIN — ATORVASTATIN CALCIUM 40 MG: 40 TABLET, FILM COATED ORAL at 10:23

## 2019-12-25 RX ADMIN — ESCITALOPRAM 10 MG: 10 TABLET, FILM COATED ORAL at 10:23

## 2019-12-25 RX ADMIN — HEPARIN SODIUM 5000 UNITS: 5000 INJECTION INTRAVENOUS; SUBCUTANEOUS at 13:41

## 2019-12-25 RX ADMIN — QUETIAPINE FUMARATE 500 MG: 300 TABLET, EXTENDED RELEASE ORAL at 20:51

## 2019-12-25 RX ADMIN — ISOSORBIDE DINITRATE 10 MG: 10 TABLET ORAL at 20:52

## 2019-12-25 RX ADMIN — ASPIRIN 81 MG 81 MG: 81 TABLET ORAL at 10:22

## 2019-12-25 RX ADMIN — CLOPIDOGREL BISULFATE 75 MG: 75 TABLET ORAL at 10:22

## 2019-12-25 RX ADMIN — CARVEDILOL 3.12 MG: 3.12 TABLET, FILM COATED ORAL at 17:56

## 2019-12-25 RX ADMIN — HEPARIN SODIUM 5000 UNITS: 5000 INJECTION INTRAVENOUS; SUBCUTANEOUS at 20:52

## 2019-12-25 ASSESSMENT — PAIN DESCRIPTION - PAIN TYPE: TYPE: ACUTE PAIN

## 2019-12-25 ASSESSMENT — PAIN DESCRIPTION - ORIENTATION: ORIENTATION: LEFT;RIGHT

## 2019-12-25 ASSESSMENT — PAIN SCALES - GENERAL
PAINLEVEL_OUTOF10: 3
PAINLEVEL_OUTOF10: 3
PAINLEVEL_OUTOF10: 0
PAINLEVEL_OUTOF10: 3

## 2019-12-25 ASSESSMENT — PAIN DESCRIPTION - FREQUENCY: FREQUENCY: INTERMITTENT

## 2019-12-25 ASSESSMENT — PAIN - FUNCTIONAL ASSESSMENT
PAIN_FUNCTIONAL_ASSESSMENT: ACTIVITIES ARE NOT PREVENTED
PAIN_FUNCTIONAL_ASSESSMENT: 0-10
PAIN_FUNCTIONAL_ASSESSMENT: ACTIVITIES ARE NOT PREVENTED

## 2019-12-25 ASSESSMENT — PAIN DESCRIPTION - LOCATION: LOCATION: RIB CAGE

## 2019-12-25 ASSESSMENT — PAIN DESCRIPTION - PROGRESSION: CLINICAL_PROGRESSION: NOT CHANGED

## 2019-12-25 ASSESSMENT — PAIN DESCRIPTION - DESCRIPTORS
DESCRIPTORS: STABBING
DESCRIPTORS: ACHING
DESCRIPTORS: ACHING

## 2019-12-25 ASSESSMENT — PAIN DESCRIPTION - ONSET: ONSET: ON-GOING

## 2019-12-25 NOTE — PROGRESS NOTES
II-XII intact, grossly non-focal.  Psychiatric: Alert and oriented, thought content appropriate  Capillary Refill: Brisk,< 3 seconds   Peripheral Pulses: +2 palpable, equal bilaterally       Labs:   Recent Labs     12/23/19  0347 12/24/19  0553   WBC 10.7 10.7   HGB 9.0* 9.3*   HCT 27.3* 29.1*    230     Recent Labs     12/23/19  0347 12/24/19  0553   * 133*   K 3.2* 3.6    102   CO2 17* 17*   BUN 11 7   CREATININE 0.8 0.9   CALCIUM 8.5 9.0     No results for input(s): AST, ALT, BILIDIR, BILITOT, ALKPHOS in the last 72 hours. No results for input(s): INR in the last 72 hours. No results for input(s): [place] in the last 72 hours. Microbiology:      Urinalysis:      Lab Results   Component Value Date    NITRU NEGATIVE 12/21/2019    WBCUA 5-10 12/21/2019    WBCUA 0-2 04/19/2012    BACTERIA NONE SEEN 12/21/2019    RBCUA 0-2 12/21/2019    BLOODU NEGATIVE 12/21/2019    SPECGRAV 1.017 05/14/2019    GLUCOSEU NEGATIVE 12/21/2019       Radiology:  XR CHEST PORTABLE   Final Result   1. Right jugular line has been inserted with catheter tip in superior vena cava. 2. Normal heart size. No effusion. 3. Mild left basilar atelectasis/pneumonia. **This report has been created using voice recognition software. It may contain minor errors which are inherent in voice recognition technology. **      Final report electronically signed by Dr. Raghav Kim on 12/21/2019 8:53 AM      CT Chest W Contrast   Final Result   1. Minimal basilar atelectasis changes. No other acute abnormality of the chest present. **This report has been created using voice recognition software. It may contain minor errors which are inherent in voice recognition technology. **      Final report electronically signed by Dr. Elver Mcarthur on 12/21/2019 5:40 AM      CT ABDOMEN PELVIS W IV CONTRAST Additional Contrast? None   Final Result   1. Heterogeneous enhancement left kidney with perinephric stranding. Findings are compatible with nephritis. 2. Small cortical cystic changes of the kidney. 3. Stable fatty liver changes. **This report has been created using voice recognition software. It may contain minor errors which are inherent in voice recognition technology. **      Final report electronically signed by Dr. Bertha Mckeon on 12/21/2019 5:43 AM      CT head without contrast   Final Result   . Negative CT of the brain. **This report has been created using voice recognition software. It may contain minor errors which are inherent in voice recognition technology. **      Final report electronically signed by Dr. Bertha Mckeon on 12/21/2019 3:24 AM      XR CHEST PORTABLE   Final Result   . Rotated patient positioning. No acute appearing pathology of the chest otherwise suspected. **This report has been created using voice recognition software. It may contain minor errors which are inherent in voice recognition technology. **      Final report electronically signed by Dr. Bertha Mckeon on 12/21/2019 2:14 AM              Electronically signed by BA Herrmann CNP on 12/25/2019 at 2:12 PM

## 2019-12-26 VITALS
SYSTOLIC BLOOD PRESSURE: 98 MMHG | HEIGHT: 64 IN | OXYGEN SATURATION: 94 % | HEART RATE: 62 BPM | RESPIRATION RATE: 18 BRPM | WEIGHT: 170.3 LBS | TEMPERATURE: 97.4 F | BODY MASS INDEX: 29.08 KG/M2 | DIASTOLIC BLOOD PRESSURE: 59 MMHG

## 2019-12-26 LAB
BLOOD CULTURE, ROUTINE: NORMAL
BLOOD CULTURE, ROUTINE: NORMAL

## 2019-12-26 PROCEDURE — 6370000000 HC RX 637 (ALT 250 FOR IP): Performed by: FAMILY MEDICINE

## 2019-12-26 PROCEDURE — 99239 HOSP IP/OBS DSCHRG MGMT >30: CPT | Performed by: NURSE PRACTITIONER

## 2019-12-26 PROCEDURE — 97110 THERAPEUTIC EXERCISES: CPT

## 2019-12-26 PROCEDURE — 94760 N-INVAS EAR/PLS OXIMETRY 1: CPT

## 2019-12-26 PROCEDURE — 6370000000 HC RX 637 (ALT 250 FOR IP): Performed by: OPHTHALMOLOGY

## 2019-12-26 PROCEDURE — 6360000002 HC RX W HCPCS: Performed by: OPHTHALMOLOGY

## 2019-12-26 PROCEDURE — 97116 GAIT TRAINING THERAPY: CPT

## 2019-12-26 PROCEDURE — 6370000000 HC RX 637 (ALT 250 FOR IP): Performed by: NURSE PRACTITIONER

## 2019-12-26 PROCEDURE — 94640 AIRWAY INHALATION TREATMENT: CPT

## 2019-12-26 RX ORDER — FERROUS SULFATE 325(65) MG
325 TABLET ORAL 2 TIMES DAILY
Qty: 30 TABLET | Refills: 0
Start: 2019-12-26

## 2019-12-26 RX ORDER — LANOLIN ALCOHOL/MO/W.PET/CERES
500 CREAM (GRAM) TOPICAL NIGHTLY
Qty: 30 CAPSULE | Refills: 0
Start: 2019-12-26 | End: 2021-02-20

## 2019-12-26 RX ORDER — ALBUTEROL SULFATE 90 UG/1
2 AEROSOL, METERED RESPIRATORY (INHALATION) EVERY 6 HOURS PRN
Qty: 1 INHALER | Refills: 0 | Status: ON HOLD
Start: 2019-12-26 | End: 2020-11-04 | Stop reason: HOSPADM

## 2019-12-26 RX ORDER — ALBUTEROL SULFATE 2.5 MG/3ML
2.5 SOLUTION RESPIRATORY (INHALATION) EVERY 6 HOURS PRN
Qty: 120 EACH | Refills: 0
Start: 2019-12-26 | End: 2020-08-26 | Stop reason: SDUPTHER

## 2019-12-26 RX ORDER — LEVOTHYROXINE SODIUM 0.07 MG/1
75 TABLET ORAL DAILY
Qty: 30 TABLET | Refills: 0
Start: 2019-12-26

## 2019-12-26 RX ORDER — FLUTICASONE PROPIONATE 50 MCG
1 SPRAY, SUSPENSION (ML) NASAL 2 TIMES DAILY
Qty: 1 BOTTLE | Refills: 0
Start: 2019-12-26

## 2019-12-26 RX ADMIN — HEPARIN SODIUM 5000 UNITS: 5000 INJECTION INTRAVENOUS; SUBCUTANEOUS at 05:29

## 2019-12-26 RX ADMIN — HEPARIN SODIUM 5000 UNITS: 5000 INJECTION INTRAVENOUS; SUBCUTANEOUS at 14:23

## 2019-12-26 RX ADMIN — ARIPIPRAZOLE 5 MG: 5 TABLET ORAL at 08:22

## 2019-12-26 RX ADMIN — TIOTROPIUM BROMIDE 18 MCG: 18 CAPSULE ORAL; RESPIRATORY (INHALATION) at 07:28

## 2019-12-26 RX ADMIN — ISOSORBIDE DINITRATE 10 MG: 10 TABLET ORAL at 08:21

## 2019-12-26 RX ADMIN — ASPIRIN 81 MG 81 MG: 81 TABLET ORAL at 08:22

## 2019-12-26 RX ADMIN — SULFAMETHOXAZOLE AND TRIMETHOPRIM 1 TABLET: 800; 160 TABLET ORAL at 08:21

## 2019-12-26 RX ADMIN — CARVEDILOL 3.12 MG: 3.12 TABLET, FILM COATED ORAL at 08:21

## 2019-12-26 RX ADMIN — HYDROXYZINE PAMOATE 50 MG: 50 CAPSULE ORAL at 08:21

## 2019-12-26 RX ADMIN — LEVOTHYROXINE SODIUM 75 MCG: 75 TABLET ORAL at 05:31

## 2019-12-26 RX ADMIN — Medication 1 PUFF: at 02:45

## 2019-12-26 RX ADMIN — ATORVASTATIN CALCIUM 40 MG: 40 TABLET, FILM COATED ORAL at 08:22

## 2019-12-26 RX ADMIN — CLOPIDOGREL BISULFATE 75 MG: 75 TABLET ORAL at 08:21

## 2019-12-26 RX ADMIN — PANTOPRAZOLE SODIUM 40 MG: 40 TABLET, DELAYED RELEASE ORAL at 08:21

## 2019-12-26 RX ADMIN — ACETAMINOPHEN 650 MG: 325 TABLET ORAL at 05:29

## 2019-12-26 RX ADMIN — ACETAMINOPHEN 650 MG: 325 TABLET ORAL at 11:21

## 2019-12-26 RX ADMIN — ESCITALOPRAM 10 MG: 10 TABLET, FILM COATED ORAL at 08:21

## 2019-12-26 ASSESSMENT — PAIN SCALES - GENERAL
PAINLEVEL_OUTOF10: 5
PAINLEVEL_OUTOF10: 5
PAINLEVEL_OUTOF10: 3

## 2019-12-26 ASSESSMENT — PAIN DESCRIPTION - PAIN TYPE: TYPE: ACUTE PAIN

## 2019-12-26 NOTE — FLOWSHEET NOTE
Ad consult from Regina Landa ()    Patient alert and orientedx4 (person, place, situation, time)  Advance Directive Consult: Advance Directive education provided and forms were completed. Copies placed in chart and original returned to patient. Physician notified. Copy sent to Medical Records. 12/26/19 8909   Encounter Summary   Services provided to: Patient   Referral/Consult From: Other disciplines  (Arminda Mcgill)   Support System Children;Friends/neighbors   Continue Visiting Yes  (12/26)   Complexity of Encounter Moderate   Length of Encounter 30 minutes   Advance Care Planning Yes   Advance Directives (For Healthcare)   Healthcare Directive Yes, patient has an advance directive for healthcare treatment   Type of Healthcare Directive Durable power of  for health care   Copy in Chart Yes, copy in chart   Chart Copy Status : Active;Current; Reviewed   Date Reviewed and Current: 12/26/19   Healthcare Agent Appointed Adult [address] Agent's Name [address] [facility] Agent's Phone Number [phone]

## 2019-12-26 NOTE — DISCHARGE SUMMARY
General appearance:  No apparent distress, appears stated age and cooperative. HEENT:  Normal cephalic, atraumatic without obvious deformity. Pupils equal, round, and reactive to light. Extra ocular muscles intact. Conjunctivae/corneas clear. Neck: Supple, with full range of motion. No jugular venous distention. Trachea midline. Respiratory:  Normal respiratory effort. Clear to auscultation, bilaterally without Rales/Wheezes/Rhonchi. Cardiovascular:  Regular rate and rhythm with normal S1/S2 without murmurs, rubs or gallops. Abdomen: Soft, obese, Lower quad tenderness, non-distended with normal bowel sounds. Musculoskeletal:  No clubbing, cyanosis or edema bilaterally. Full range of motion without deformity. Skin: Skin color, texture, turgor normal.  No rashes or lesions. Neurologic:  Neurovascularly intact without any focal sensory/motor deficits. Cranial nerves: II-XII intact, grossly non-focal.  Psychiatric:  Alert and oriented, thought content appropriate, normal insight  Capillary Refill: Brisk,< 3 seconds   Peripheral Pulses: +2 palpable, equal bilaterally       Labs: For convenience and continuity at follow-up the following most recent labs are provided:      CBC:    Lab Results   Component Value Date    WBC 10.7 12/24/2019    HGB 9.3 12/24/2019    HCT 29.1 12/24/2019     12/24/2019       Renal:    Lab Results   Component Value Date     12/24/2019    K 3.6 12/24/2019    K 3.7 12/21/2019     12/24/2019    CO2 17 12/24/2019    BUN 7 12/24/2019    CREATININE 0.9 12/24/2019    CALCIUM 9.0 12/24/2019    PHOS 3.2 11/17/2017         Significant Diagnostic Studies    Radiology:   XR CHEST PORTABLE   Final Result   1. Right jugular line has been inserted with catheter tip in superior vena cava. 2. Normal heart size. No effusion. 3. Mild left basilar atelectasis/pneumonia. **This report has been created using voice recognition software.   It may contain minor errors CARDIAC;       Follow-up visits:   Sheila Beebe MD  [address] [place] [address]  [phone]    Schedule an appointment as soon as possible for a visit in 1 week           Discharge Medications:      Carlos Christine"   Home Medication Instructions BRANDEN:[id number]    Printed on:12/26/19 8008   Medication Information                      albuterol (PROVENTIL) (2.5 MG/3ML) 0.083% nebulizer solution  Take 3 mLs by nebulization every 6 hours as needed for Wheezing             Albuterol Sulfate (VENTOLIN HFA IN)  Inhale 90 mg into the lungs 2 times daily as needed              albuterol sulfate  (90 Base) MCG/ACT inhaler  Inhale 2 puffs into the lungs every 6 hours as needed for Wheezing             ARIPiprazole (ABILIFY) 2 MG tablet  Take 2 mg by mouth daily             aspirin 81 MG chewable tablet  Take 1 tablet by mouth daily             atorvastatin (LIPITOR) 40 MG tablet  Take 40 mg by mouth daily             carvedilol (COREG) 3.125 MG tablet  TAKE 1 TABLET BY MOUTH 2 TIMES DAILY (WITH MEALS)             clopidogrel (PLAVIX) 75 MG tablet  TAKE 1 TABLET BY MOUTH DAILY             escitalopram (LEXAPRO) 10 MG tablet  Take 10 mg by mouth daily             escitalopram (LEXAPRO) 20 MG tablet  Take 20 mg by mouth daily             famotidine (PEPCID) 40 MG tablet  Take 40 mg by mouth daily             fenofibrate 160 MG tablet  Take 160 mg by mouth daily             ferrous sulfate 325 (65 Fe) MG tablet  Take 325 mg by mouth daily (with breakfast)             fluticasone (FLONASE) 50 MCG/ACT nasal spray  1 spray by Each Nostril route daily             folic acid (FOLVITE) 1 MG tablet  Take 1 mg by mouth daily             hydrochlorothiazide (HYDRODIURIL) 25 MG tablet  TAKE 1 TABLET BY MOUTH TWICE DAILY             hydrOXYzine (VISTARIL) 50 MG capsule  Take 50 mg by mouth 3 times daily as needed for Itching or Anxiety              isosorbide dinitrate (ISORDIL) 10 MG tablet  TAKE 1 TABLET

## 2019-12-26 NOTE — PLAN OF CARE
Problem: Falls - Risk of:  Goal: Will remain free from falls  Description  Will remain free from falls  12/24/2019 2329 by Bryant Carr RN  Outcome: Ongoing  Note:   Pt free from fall this shift. Fall prevention measures and hourly rounding in place. Problem: Discharge Planning:  Goal: Discharged to appropriate level of care  Description  Discharged to appropriate level of care  Outcome: Ongoing  Note:   Pt will be discharged to [facility] when time is appropriate. Problem: Gas Exchange - Impaired:  Goal: Levels of oxygenation will improve  Description  Levels of oxygenation will improve  Outcome: Ongoing  Note:   Pt SpO2 at 95% on room air. Pt having expiratory and inspiratory wheezing. Problem: Infection, Septic Shock:  Goal: Will show no infection signs and symptoms  Description  Will show no infection signs and symptoms  Outcome: Ongoing  Note:   No fevers, tachycardia, hypotension noted. Pt denies any complaints    Care plan reviewed with patient. Patient  verbalizes understanding of the plan of care and contribute to goal setting.
Problem: Gas Exchange - Impaired:  Goal: Levels of oxygenation will improve  Description  Levels of oxygenation will improve  Outcome: Ongoing
Problem: Impaired respiratory status  Goal: Clear lung sounds  Description  Clear lung sounds     Outcome: Ongoing    Improve breath sounds, increase aeration and decrease WOB.
This Shift  Note:   No change in peripheral pulses, no pain in legs nor change in temp or coloration of limb; patient changes position frequently.   Goal: Absence of signs or symptoms of impaired coagulation  Description  Absence of signs or symptoms of impaired coagulation  Outcome: Met This Shift  Note:   No change noted

## 2019-12-26 NOTE — PROGRESS NOTES
1 Pedro Wilder Notified Rom rAana at [facility] that patient was being discharged today. Bette Rueda informed writer that he was unable to take patient at facility today and that he should be able to take tomorrow. Morgan Mo NP that facility was unable to accept patient today.

## 2019-12-26 NOTE — CARE COORDINATION
12/26/19, 11:52 AM    DISCHARGE PLANNING EVALUATION       AVS has been faxed to [facility] for review. Waiting decision from [place]. Spoke with Brenda Cam and her son, Juma Newman. Juma Newman needs to leave soon and will not be able to transport. Discussed ambulette transport, and he is in agreement with this and with cost of transport.

## 2019-12-26 NOTE — CARE COORDINATION
12/26/19, 12:37 PM    DISCHARGE PLANNING EVALUATION      Spoke with [facility]. Facility is able to accept back today. Transfer packet on chart for discharge. Discussed with Daksha Myers, who is in agreement. Transport request for ambulette faxed to [facility].       12/26/19, 12:39 PM    Patient goals/plan/ treatment preferences discussed by  and . Patient goals/plan/ treatment preferences reviewed with patient/ family. Patient/ family verbalize understanding of discharge plan and are in agreement with goal/plan/treatment preferences. Understanding was demonstrated using the teach back method. AVS provided by RN at time of discharge, which includes all necessary medical information pertaining to the patients current course of illness, treatment, post-discharge goals of care, and treatment preferences.

## 2019-12-26 NOTE — PROGRESS NOTES
Progress note: Infectious diseases    Patient - Farhan Rodrigues,  Age - 60 y.o.    - 1955      Room Number - 7K-25/025-A   MRN -  [id number]   Acct # - [de-identified]  Date of Admission -  2019 12:54 AM    SUBJECTIVE:   No new issues. She wants to be discharged  OBJECTIVE   VITALS    height is 5' 4\" (1.626 m) and weight is 170 lb 4.8 oz (77.2 kg). Her oral temperature is 97.4 °F (36.3 °C). Her blood pressure is 128/79 and her pulse is 74. Her respiration is 18 and oxygen saturation is 94%. Wt Readings from Last 3 Encounters:   19 170 lb 4.8 oz (77.2 kg)   19 167 lb (75.8 kg)   19 174 lb (78.9 kg)       I/O (24 Hours)    Intake/Output Summary (Last 24 hours) at 2019 1027  Last data filed at 2019 0519  Gross per 24 hour   Intake 550 ml   Output 500 ml   Net 50 ml       General Appearance  Awake, alert, oriented,  not  In acute distress  HEENT - normocephalic, atraumatic, pale conjunctiva,  anicteric sclera  Neck - Supple, no mass  Lungs -  Bilateral good air entry, no rhonchi, no wheeze  Cardiovascular - Heart sounds are normal.  Regular rate and rhythm without murmur, gallop or rub.   Abdomen - soft, not distended, nontender,   Neurologic -oriented to name and place  Skin - No bruising or bleeding  Extremities - No edema, no cyanosis, clubbing     MEDICATIONS:      sulfamethoxazole-trimethoprim  1 tablet Oral 2 times per day    potassium replacement protocol   Other RX Placeholder    sodium chloride  500 mL Intravenous Once    ARIPiprazole  5 mg Oral Daily    aspirin  81 mg Oral Daily    atorvastatin  40 mg Oral Daily    carvedilol  3.125 mg Oral BID WC    clopidogrel  75 mg Oral Daily    escitalopram  10 mg Oral Daily    isosorbide dinitrate  10 mg Oral TID    levothyroxine  75 mcg Oral Daily    pantoprazole  40 mg Oral Daily    QUEtiapine  500 mg Oral Nightly    Substance-induced psychotic disorder ([facility] 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.959    Bipolar 1 disorder, depressed ([facility] F31.9    Polysubstance abuse ([facility] F19.10    Major depressive disorder, recurrent [facility] F33.9    Stroke-like symptom R29.90    Right arm weakness R29.898    Delirium R41.0    Paresthesias R20.2    Depression, major, recurrent ([facility] F33.9    Depression with suicidal ideation F32.9, R45.851    Amnesia R41.3    Bipolar disorder, current episode mixed, moderate ([facility]) F31.62    Vxhdyigmy-Hagbdqx-Thbxua disease M22.40    Carpal tunnel syndrome of left wrist G56.02    Change of, skin texture R23.4    Chronic midline low back pain without sciatica M54.5, G89.29    Coronary vasospasm ([facility]) I20.1    Derangement of knee M23.90    Disorder associated with Helicobacter species M74.93    Disturbance of skin sensation R20.9    Encounter for surgical follow-up care Z48.89    Endometriosis N80.9    Environmental and seasonal allergies J30.89    Glaucoma suspect H40.009    History of arterial ischemic stroke Z86.73    Mixed hyperlipidemia E78.2    Large liver R16.0    Lesion of ulnar nerve G56.20    Nasal congestion R09.81    Nicotine dependence F17.200    Pancreatic insufficiency K86.89    Primary osteoarthritis involving multiple joints M15.0    Sciatica M54.30    Sprain of right foot S93.601A    Preoperative state [id number]    Type 2 diabetes mellitus without complication, without long-term current use of insulin (HCC) E11.9    Urinary incontinence, overflow N39.490    Elevated lactic acid level R79.89    Acute renal failure with acute cortical necrosis (HCC) N17.1    Pyelonephritis of left kidney N12    Pyelonephritis N12    Septicemia ([facility]) A41.9         ASSESSMENT/PLAN   UTI: clinically better: ok with discharge plan. Will scribe oral bactrim  DM  Follow up as needed.       Micky Burleson MD, FACP 12/26/2019 10:27 AM

## 2019-12-26 NOTE — CARE COORDINATION
12/26/19, 10:31 AM    DISCHARGE PLANNING EVALUATION      Spoke with Bhavin Loya. The facility was unable to accept on 12/24 as medications on the AVS were not correct, and then their pharmacy closed for the holiday 12/24-12/25. They are willing to accept today, if medications are corrected or if physician will write orders to disregard medications and return to assisted living meds. Discussed with INDY Valerio, who will discuss with CNP when she rounds today, to have AVS medications corrected.

## 2019-12-26 NOTE — PROGRESS NOTES
sway in standing    Balance:  Dynamic Standing Balance: Stand By Assistance, standing at sink   Static standing balance: SBA, patient leaning against wall using BUE on door frame when talking to another patient in brito    Exercise:  None this session, patient reported LE fatigue from ambulation, okayed UE exercises with OT following PT therapy. Functional Outcome Measures: Completed  AM-PAC Inpatient Mobility without Stair Climbing Raw Score : 18  AM-PAC Inpatient without Stair Climbing T-Scale Score : 51.97    ASSESSMENT:  Assessment: Patient progressing toward established goals. and Noted unsteadiness when up this date. Activity Tolerance:  Patient tolerance of  treatment: good. Equipment Recommendations:Equipment Needed: No  Other: pt refused use of cane or walker  Discharge Recommendations:  Continue to assess pending progress(plans return to assisted living with PT as PTA)    Plan: Times per week: 3-5X GM  Times per day: Daily  Specific instructions for Next Treatment: therex and mobility    Patient Education  Patient Education: Plan of Care, Bed Mobility, Transfers, Gait    Goals:  Patient goals : return to assisted living  Short term goals  Time Frame for Short term goals: by discharge  Short term goal 1: transfer with MOD I  Short term goal 2: amb 150'x1 without AD and MOD I to walk safely to dining brito at [place]. Long term goals  Time Frame for Long term goals : no LTGs set secondary to short ELOS    Following session, patient left in safe position with all fall risk precautions in place.

## 2019-12-26 NOTE — DISCHARGE INSTR - COC
Continuity of Care Form    Patient Name: Yossi Islas   :  1955  MRN:  [id number]    Admit date:  2019  Discharge date:  19    Code Status Order: Prior   Advance Directives:     Admitting Physician:  Peter Sanchez MD  PCP: Jyoti Leone MD    Discharging Nurse: [place] Unit/Room#: 7K-25/025-A  Discharging Unit Phone Number: [phone]    Emergency Contact:   Extended Emergency Contact Information  Primary Emergency Contact: Tomasa Craft Phone: [phone]  Relation: Child  Secondary Emergency Contact: Mary Jane Reeves [address] Phone: [phone]  Mobile Phone: [phone]  Relation: Brother/Sister  Hearing or visual needs: None  Other needs: None  Preferred language: English   needed?  No    Past Surgical History:  Past Surgical History:   Procedure Laterality Date    ABDOMEN SURGERY      x4 removed cysts and ovary removed    CARDIAC SURGERY      heart caths, no stents    CARPAL TUNNEL RELEASE Bilateral 2016    CHOLECYSTECTOMY, LAPAROSCOPIC  6/12/15    Dr. Caitlin Chaney, ESOPHAGUS      ELBOW SURGERY Right 10/7/2004    Dr. John Llanos Bilateral 2016    decompression   [place] [facility]    Dr. Akins [facility]    ROTATOR CUFF REPAIR  ,     right shoulder    SHOULDER SURGERY  2014    right    SKIN BIOPSY  2006    under left eye       Immunization History:   Immunization History   Administered Date(s) Administered    Influenza Virus Vaccine 2012, 10/10/2012, 10/14/2013, 2014, 10/11/2015, 10/27/2016, 10/08/2018    Influenza, MDCK Quadv, with preserv IM (Flucelvax 4 yrs and older) 10/01/2019    Influenza, Quadv, IM, PF (6 mo and older Fluzone, Flulaval, Fluarix, and 3 yrs and older Afluria) 10/27/2016, 10/08/2018    Pneumococcal Polysaccharide (Nuioliryj07) 2012, 10/11/2015    Tdap (Boostrix, Adacel) 06/24/2013, 04/22/2016, 06/11/2019       Active Problems:  Patient Active Problem List   Diagnosis Code    GERD (gastroesophageal reflux disease) K21.9    Colon polyps K63.5    Chest pain, atypical R07.89    Gastroenteritis K52.9    Pneumonia J18.9    Shoulder pain M25.519    History of pulmonary embolism Z86.711    COPD (chronic obstructive pulmonary disease) ([facility]) J44.9    Hypoglycemia E16.2    Tobacco use disorder F17.200    Anticoagulated on Coumadin Z79.01    Bipolar disorder ([facility]) F31.9    Cannabis abuse F12.10    Cocaine abuse ([facility] F14.10    Alcohol dependence in remission ([facility] F10.21    Cholecystitis with cholelithiasis K80.10    GI bleed K92.2    Vomiting R11.10    Erosive esophagitis K22.10    Hypokalemia E87.6    HTN (hypertension), benign I10    Bipolar 1 disorder ([facility]) F31.9    Chronic pain G89.29    Hiatal hernia K44.9    Chest pain R07.9    Vomiting R11.10    Erosive gastritis K29.60    H pylori ulcer K27.9, B96.81    Hematemesis K92.0    History of Helicobacter pylori infection Z86.19    Intractable abdominal pain R10.9    Intractable vomiting with nausea R11.2    Epigastric abdominal pain R10.13    Acute blood loss anemia D62    Chronic chest pain R07.9, G89.29    Hyponatremia U19.5    Metabolic acidosis L24.9    Essential hypertension I10    COPD with acute exacerbation ([facility]) J44.1    Hypothyroidism E03.9    History of DVT (deep vein thrombosis) Z86.718    Depression F32.9    Tobacco abuse Z72.0    Hematemesis with nausea K92.0    Hypoxia R09.02    Pleural effusion, bilateral J90    Suicidal ideation R45.851    Bipolar disorder, in partial remission, most recent episode depressed ([facility] F31.75    Bipolar II disorder ([facility] G03.80    Diastolic dysfunction U61.26    Angina, class II ([facility]) I20.9    Unstable angina ([facility]) I20.0    Dyslipidemia E78.5    Electrolyte abnormality E87.8    COPD exacerbation ([facility]) J44.1    Acute respiratory insufficiency R06.89    Chronic diastolic heart failure ([facility]) I50.32    Tobacco use disorder F17.200    Nasal septal deviation J34.2    Hypertrophy of left inferior nasal turbinate J34.3    Rhinogenic headache R51    Asymmetrical sensorineural hearing loss H90.5    Tinnitus, left ear H93.12    Psychosis (HCC) F29    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    Bipolar 1 disorder, depressed ([facility]) F31.9    Polysubstance abuse ([address] F19.10    Major depressive disorder, recurrent ([facility]) F33.9    Stroke-like symptom R29.90    Right arm weakness R29.898    Delirium R41.0    Paresthesias R20.2    Depression, major, recurrent ([facility]) F33.9    Depression with suicidal ideation F32.9, R45.851    Amnesia R41.3    Bipolar disorder, current episode mixed, moderate ([facility]) F31.62    Ohyxokicp-Vqdlubk-Uflaze disease M22.40    Carpal tunnel syndrome of left wrist G56.02    Change of, skin texture R23.4    Chronic midline low back pain without sciatica M54.5, G89.29    Coronary vasospasm ([facility]) I20.1    Derangement of knee M23.90    Disorder associated with Helicobacter species J48.16    Disturbance of skin sensation R20.9    Encounter for surgical follow-up care Z48.89    Endometriosis N80.9    Environmental and seasonal allergies J30.89    Glaucoma suspect H40.009    History of arterial ischemic stroke Z86.73    Mixed hyperlipidemia E78.2    Large liver R16.0    Lesion of ulnar nerve G56.20    Nasal congestion R09.81    Nicotine dependence F17.200    Pancreatic insufficiency K86.89    Primary osteoarthritis involving multiple joints M15.0    Sciatica M54.30    Sprain of right foot S93.601A    Preoperative state [id number]    Type 2 diabetes mellitus without complication, without long-term current use of insulin ([facility]) E11.9    Urinary incontinence, overflow N39.490    Elevated lactic acid level R79.89    Acute renal failure with acute cortical necrosis ([facility]) N17.1    Pyelonephritis of left kidney

## 2019-12-26 NOTE — PROGRESS NOTES
[facility]  Occupational Therapy  Daily Note  Time:   Time In: 0900  Time Out: 0914  Timed Code Treatment Minutes: 14 Minutes  Minutes: 14          Date: 2019  Patient Name: Wanda Romero,   Gender: female      Room: -25/025-A  MRN: [id number]  : 1955  (60 y.o.)  Referring Practitioner: Dr. Delano Campos MD  Diagnosis: Pyelonephritis  Additional Pertinent Hx: Pt admitted with fever and vomiting. She had confusion. Pt is a resident of [facility]. Restrictions/Precautions:  Restrictions/Precautions: Fall Risk    SUBJECTIVE: Patient in bed after just finishing activity with Physical therapy  Patient reports feeling a little out of it and wanting to relax. patient OK with ARM exercises. RN oked treatment. Patient talking with slightly slow speech with some sluring of words    PAIN: 8/10:  Left side in hip area    ADL:   Grooming: Stand By Assistance and with set-up. washing hands and face. Patient declined OOB activity or sitting EOB at thsi time. ADDITIONAL ACTIVITIES:  Pt completed RUE strengthening exercises x10 reps x1 set  And LUE in pain tolerance due to patient reporting that she cannot do too many. Gentle exercisers this date with AROM in all joints and all planes in order to increase strength and improve activity tolerance required for functional toilet & shower transfers and ADL routine. Pt tolerated  well, requiring  rest breaks throughout task. Education on pain free range and form and technique. ASSESSMENT:     Activity Tolerance:  Patient tolerance of  treatment: good.        Discharge Recommendations: Continue to assess pending progress, Patient would benefit from continued therapy after discharge  Equipment Recommendations: Equipment Needed: No  Plan: Times per week: 3-5x  Specific instructions for Next Treatment: Functional mobility; ADLs and standing endurance; upper body exercises  Current Treatment Recommendations: Cognitive Reorientation, Endurance Training, Self-Care / ADL, Functional Mobility Training  Plan Comment: Pt would benefit from continued OT when medically stable and discharged from Acute. OT recommended while pt is at Assisted Living. Patient Education  Patient Education: Plan of Care and Home Exercise Program    Goals  Short term goals  Time Frame for Short term goals: By discharge  Short term goal 1: Pt will complete simple ADLs while standing for over 5 minute duration with supervision to increase her endurance for ease of bathing or dressing. Short term goal 2: Pt will complete BUE ROM/moderate resistance exercises with any handout and verbal cues for slow breathing as needed to increase her endurance for ease od doing self care or going shopping. Short term goal 3: Pt will demonstrate functional mobility walking in the hallway around obstacles while  using the least restrictive AD with supervision to prepare for doing any shopping. Short term goal 4: Pt will be oriented daily to situation, place and her goals with verbal cues as needed to increase her safety and facilitate progress toward her goals. Long term goals  Time Frame for Long term goals : None secondary to short estimated length of stay. Following session, patient left in safe position with all fall risk precautions in place.

## 2019-12-26 NOTE — PROGRESS NOTES
Report called to Panchito Browne LPN at [facility].  Patient discharged with all belongings

## 2019-12-30 ENCOUNTER — OFFICE VISIT (OUTPATIENT)
Dept: NEUROLOGY | Age: 64
End: 2019-12-30
Payer: MEDICAID

## 2019-12-30 VITALS
HEIGHT: 64 IN | WEIGHT: 160.4 LBS | HEART RATE: 64 BPM | DIASTOLIC BLOOD PRESSURE: 70 MMHG | SYSTOLIC BLOOD PRESSURE: 122 MMHG | BODY MASS INDEX: 27.39 KG/M2

## 2019-12-30 DIAGNOSIS — R29.90 STROKE-LIKE SYMPTOMS: ICD-10-CM

## 2019-12-30 DIAGNOSIS — G44.89 OTHER HEADACHE SYNDROME: Primary | ICD-10-CM

## 2019-12-30 PROCEDURE — 99213 OFFICE O/P EST LOW 20 MIN: CPT | Performed by: PSYCHIATRY & NEUROLOGY

## 2019-12-30 RX ORDER — NAPROXEN 500 MG/1
500 TABLET ORAL 2 TIMES DAILY WITH MEALS
COMMUNITY
End: 2019-12-30

## 2019-12-30 RX ORDER — ACETAMINOPHEN 325 MG/1
650 TABLET ORAL EVERY 6 HOURS PRN
COMMUNITY
End: 2020-06-30

## 2019-12-30 RX ORDER — TOPIRAMATE 50 MG/1
50 TABLET, FILM COATED ORAL DAILY
Qty: 30 TABLET | Refills: 3 | Status: SHIPPED | OUTPATIENT
Start: 2019-12-30 | End: 2020-04-21 | Stop reason: SDUPTHER

## 2020-01-14 ENCOUNTER — OFFICE VISIT (OUTPATIENT)
Dept: UROLOGY | Age: 65
End: 2020-01-14
Payer: MEDICAID

## 2020-01-14 VITALS
BODY MASS INDEX: 27.06 KG/M2 | SYSTOLIC BLOOD PRESSURE: 110 MMHG | HEIGHT: 64 IN | DIASTOLIC BLOOD PRESSURE: 60 MMHG | WEIGHT: 158.5 LBS

## 2020-01-14 LAB
BILIRUBIN URINE: NEGATIVE
BLOOD URINE, POC: NEGATIVE
CHARACTER, URINE: CLEAR
COLOR, URINE: YELLOW
GLUCOSE URINE: NEGATIVE MG/DL
KETONES, URINE: NEGATIVE
LEUKOCYTE CLUMPS, URINE: ABNORMAL
NITRITE, URINE: NEGATIVE
PH, URINE: 6.5 (ref 5–9)
PROTEIN, URINE: NEGATIVE MG/DL
SPECIFIC GRAVITY, URINE: 1.01 (ref 1–1.03)
UROBILINOGEN, URINE: 0.2 EU/DL (ref 0–1)

## 2020-01-14 PROCEDURE — 81003 URINALYSIS AUTO W/O SCOPE: CPT | Performed by: UROLOGY

## 2020-01-14 PROCEDURE — 99203 OFFICE O/P NEW LOW 30 MIN: CPT | Performed by: UROLOGY

## 2020-01-14 RX ORDER — NITROFURANTOIN MACROCRYSTALS 50 MG/1
50 CAPSULE ORAL
Qty: 30 CAPSULE | Refills: 0 | Status: SHIPPED | OUTPATIENT
Start: 2020-01-14 | End: 2020-01-24

## 2020-01-14 ASSESSMENT — ENCOUNTER SYMPTOMS
NAUSEA: 0
BACK PAIN: 1
SHORTNESS OF BREATH: 1
FACIAL SWELLING: 0
COLOR CHANGE: 0
BLOOD IN STOOL: 0
EYE REDNESS: 0
EYE PAIN: 0
COUGH: 1

## 2020-01-14 NOTE — PROGRESS NOTES
Mother     High Cholesterol Mother     Cancer Father         brain    Depression Father     Heart Disease Father     High Cholesterol Father     Mental Illness Father     Cancer Sister     Heart Attack Sister     Heart Disease Maternal Uncle     High Cholesterol Maternal Uncle     Diabetes Maternal Grandmother     Heart Disease Maternal Grandmother     High Cholesterol Maternal Grandmother     Early Death Maternal Grandfather     Heart Disease Maternal Grandfather     High Cholesterol Maternal Grandfather     Stroke Maternal Grandfather     Heart Disease Paternal Grandmother     High Cholesterol Paternal Grandmother     Heart Disease Paternal Grandfather     High Cholesterol Paternal Grandfather        Past Surgical History:   Procedure Laterality Date    ABDOMEN SURGERY      x4 removed cysts and ovary removed    CARDIAC SURGERY      heart caths, no stents    CARPAL TUNNEL RELEASE Bilateral 2016    CHOLECYSTECTOMY, LAPAROSCOPIC  6/12/15    Dr. Tim Tran    COLONOSCOPY  2011   [address], ESOPHAGUS      ELBOW SURGERY Right 10/7/2004    Dr. Miguel Pulido Bilateral 2016    decompression   [facility] [address] [facility]    ROTATOR CUFF REPAIR  2004, 2014    right shoulder    SHOULDER SURGERY  2014    right    SKIN BIOPSY  2006    under left eye       Allergies   Allergen Reactions    Seasonal Other (See Comments)     sneezing         Current Outpatient Medications:     nitrofurantoin (MACRODANTIN) 50 MG capsule, Take 1 capsule by mouth daily (with breakfast) for 10 days, Disp: 30 capsule, Rfl: 0    metFORMIN (GLUCOPHAGE) 1000 MG tablet, Take 1,000 mg by mouth 2 times daily (with meals), Disp: , Rfl:     acetaminophen (TYLENOL) 325 MG tablet, Take 650 mg by mouth every 6 hours as needed for Pain, Disp: , Rfl:     topiramate (TOPAMAX) 50 MG tablet, Take 1 tablet by mouth daily, Disp: 30 tablet, Rfl: 3    Pancrelipase, Lip-Prot-Amyl, (ZENPEP) 17038-853326 units CPEP, 2 caps 8/12/1600 1 cap at HS, Disp: 120 capsule, Rfl: 0    niacin 250 MG extended release capsule, Take 2 capsules by mouth nightly, Disp: 30 capsule, Rfl: 0    albuterol (PROVENTIL) (2.5 MG/3ML) 0.083% nebulizer solution, Take 3 mLs by nebulization every 6 hours as needed for Wheezing, Disp: 120 each, Rfl: 0    levothyroxine (SYNTHROID) 75 MCG tablet, Take 1 tablet by mouth daily everyday except Sunday take 150 mcg., Disp: 30 tablet, Rfl: 0    fluticasone (FLONASE) 50 MCG/ACT nasal spray, 1 spray by Each Nostril route 2 times daily, Disp: 1 Bottle, Rfl: 0    ferrous sulfate 325 (65 Fe) MG tablet, Take 1 tablet by mouth 2 times daily, Disp: 30 tablet, Rfl: 0    albuterol sulfate HFA (VENTOLIN HFA) 108 (90 Base) MCG/ACT inhaler, Inhale 2 puffs into the lungs every 6 hours as needed for Wheezing, Disp: 1 Inhaler, Rfl: 0    folic acid (FOLVITE) 1 MG tablet, Take 1 mg by mouth daily, Disp: , Rfl:     ARIPiprazole (ABILIFY) 2 MG tablet, Take 2 mg by mouth daily, Disp: , Rfl:     famotidine (PEPCID) 40 MG tablet, Take 40 mg by mouth daily, Disp: , Rfl:     atorvastatin (LIPITOR) 40 MG tablet, Take 40 mg by mouth daily, Disp: , Rfl:     hydrochlorothiazide (HYDRODIURIL) 25 MG tablet, TAKE 1 TABLET BY MOUTH TWICE DAILY, Disp: 60 tablet, Rfl: 2    SPIRIVA HANDIHALER 18 MCG inhalation capsule, , Disp: , Rfl: 0    escitalopram (LEXAPRO) 20 MG tablet, Take 20 mg by mouth daily, Disp: , Rfl:     escitalopram (LEXAPRO) 10 MG tablet, Take 10 mg by mouth daily, Disp: , Rfl:     fenofibrate 160 MG tablet, Take 160 mg by mouth daily, Disp: , Rfl:     clopidogrel (PLAVIX) 75 MG tablet, TAKE 1 TABLET BY MOUTH DAILY, Disp: 28 tablet, Rfl: 3    hydrOXYzine (VISTARIL) 50 MG capsule, Take 50 mg by mouth 3 times daily as needed for Itching or Anxiety , Disp: , Rfl:     traZODone (DESYREL) 100 MG tablet, Take 2 tablets by mouth nightly (Patient taking differently: Take 300 mg well-developed. HENT:      Head: Normocephalic and atraumatic. Eyes:      Pupils: Pupils are equal, round, and reactive to light. Abdominal:      General: Bowel sounds are normal. There is no distension. Palpations: Abdomen is soft. There is no mass. Tenderness: There is tenderness. There is right CVA tenderness and left CVA tenderness. There is no guarding. Hernia: No hernia is present. Genitourinary:     Vagina: Normal.   Skin:     General: Skin is warm and dry. Neurological:      Mental Status: She is alert and oriented to person, place, and time. Assessment:       Diagnosis Orders   1. Kidney stone  LITHOLINK   2. Gross hematuria     3. Urge incontinence of urine     4. Incontinence of feces, unspecified fecal incontinence type     5. Chronic cystitis         Ms. Coulterpresents today in consultation for Kidney stone [N20.0]. The patient has never had an evaluation for kidney stones. The patient reports she has had at least 3-4 bladder infections yearly. She requires pull-ups several times a day for her urinary and fecal incontinence. The possibilities for her gross hematuria include kidney stones and anticoagulation therapy. Plan:        I have given her a requisition for a 24-hour urine. Have sent an electronic prescription for Macrodantin 50 mg daily. She will will take 1 capsule every morning with food. She will return for cystoscopy as well as PNE testing. I have discussed PNE testing in detail. I have discussed the success rate for this for you urinary and fecal incontinence. He would like to proceed.

## 2020-01-14 NOTE — PATIENT INSTRUCTIONS
You may receive a survey regarding the care you received during your visit. Your input is valuable to us. We encourage you to complete and return your survey. We hope you will choose us in the future for your healthcare needs. Patient Education        Stopping Smoking: Care Instructions  Your Care Instructions  Cigarette smokers crave the nicotine in cigarettes. Giving it up is much harder than simply changing a habit. Your body has to stop craving the nicotine. It is hard to quit, but you can do it. There are many tools that people use to quit smoking. You may find that combining tools works best for you. There are several steps to quitting. First you get ready to quit. Then you get support to help you. After that, you learn new skills and behaviors to become a nonsmoker. For many people, a necessary step is getting and using medicine. Your doctor will help you set up the plan that best meets your needs. You may want to attend a smoking cessation program to help you quit smoking. When you choose a program, look for one that has proven success. Ask your doctor for ideas. You will greatly increase your chances of success if you take medicine as well as get counseling or join a cessation program.  Some of the changes you feel when you first quit tobacco are uncomfortable. Your body will miss the nicotine at first, and you may feel short-tempered and grumpy. You may have trouble sleeping or concentrating. Medicine can help you deal with these symptoms. You may struggle with changing your smoking habits and rituals. The last step is the tricky one: Be prepared for the smoking urge to continue for a time. This is a lot to deal with, but keep at it. You will feel better. Follow-up care is a key part of your treatment and safety. Be sure to make and go to all appointments, and call your doctor if you are having problems.  It's also a good idea to know your test results and keep a list of the medicines you take.  How can you care for yourself at home? · Ask your family, friends, and coworkers for support. You have a better chance of quitting if you have help and support. · Join a support group, such as Nicotine Anonymous, for people who are trying to quit smoking. · Consider signing up for a smoking cessation program, such as the American Lung Association's Freedom from Smoking program.  · Get text messaging support. Go to the website at www.smokefree. gov to sign up for the [facility] program.  · Set a quit date. Pick your date carefully so that it is not right in the middle of a big deadline or stressful time. Once you quit, do not even take a puff. Get rid of all ashtrays and lighters after your last cigarette. Clean your house and your clothes so that they do not smell of smoke. · Learn how to be a nonsmoker. Think about ways you can avoid those things that make you reach for a cigarette. ? Avoid situations that put you at greatest risk for smoking. For some people, it is hard to have a drink with friends without smoking. For others, they might skip a coffee break with coworkers who smoke. ? Change your daily routine. Take a different route to work or eat a meal in a different place. · Cut down on stress. Calm yourself or release tension by doing an activity you enjoy, such as reading a book, taking a hot bath, or gardening. · Talk to your doctor or pharmacist about nicotine replacement therapy, which replaces the nicotine in your body. You still get nicotine but you do not use tobacco. Nicotine replacement products help you slowly reduce the amount of nicotine you need. These products come in several forms, many of them available over-the-counter:  ? Nicotine patches  ? Nicotine gum and lozenges  ? Nicotine inhaler  · Ask your doctor about bupropion (Wellbutrin) or varenicline (Chantix), which are prescription medicines. They do not contain nicotine.  They help you by reducing withdrawal symptoms, such as

## 2020-01-20 ENCOUNTER — HOSPITAL ENCOUNTER (OUTPATIENT)
Dept: MAMMOGRAPHY | Age: 65
Discharge: HOME | End: 2020-01-20
Payer: MEDICAID

## 2020-01-20 PROCEDURE — 77063 BREAST TOMOSYNTHESIS BI: CPT

## 2020-01-20 RX ORDER — HYDROCHLOROTHIAZIDE 25 MG/1
TABLET ORAL
Qty: 60 TABLET | Refills: 5 | Status: SHIPPED | OUTPATIENT
Start: 2020-01-20 | End: 2020-04-20

## 2020-01-24 ENCOUNTER — HOSPITAL ENCOUNTER (OUTPATIENT)
Dept: WOMENS IMAGING | Age: 65
Discharge: HOME | End: 2020-01-24
Payer: MEDICAID

## 2020-01-24 PROCEDURE — 76642 ULTRASOUND BREAST LIMITED: CPT

## 2020-01-24 PROCEDURE — G0279 TOMOSYNTHESIS, MAMMO: HCPCS

## 2020-02-05 LAB
AVERAGE GLUCOSE: 154
CHOLESTEROL, TOTAL: 179 MG/DL
CHOLESTEROL/HDL RATIO: ABNORMAL
HBA1C MFR BLD: 7 %
HDLC SERPL-MCNC: 30 MG/DL (ref 35–70)
LDL CHOLESTEROL CALCULATED: ABNORMAL
TRIGL SERPL-MCNC: 494 MG/DL
VLDLC SERPL CALC-MCNC: ABNORMAL MG/DL

## 2020-02-10 ENCOUNTER — OFFICE VISIT (OUTPATIENT)
Dept: PULMONOLOGY | Age: 65
End: 2020-02-10
Payer: MEDICAID

## 2020-02-10 VITALS
HEART RATE: 71 BPM | SYSTOLIC BLOOD PRESSURE: 122 MMHG | BODY MASS INDEX: 25.3 KG/M2 | WEIGHT: 161.2 LBS | HEIGHT: 67 IN | TEMPERATURE: 97.8 F | OXYGEN SATURATION: 94 % | DIASTOLIC BLOOD PRESSURE: 68 MMHG

## 2020-02-10 PROCEDURE — 99214 OFFICE O/P EST MOD 30 MIN: CPT | Performed by: NURSE PRACTITIONER

## 2020-02-10 ASSESSMENT — ENCOUNTER SYMPTOMS
WHEEZING: 0
VOMITING: 0
ABDOMINAL PAIN: 0
COUGH: 1
NAUSEA: 0
CHEST TIGHTNESS: 0
SHORTNESS OF BREATH: 0
DIARRHEA: 0
EYES NEGATIVE: 1

## 2020-02-10 NOTE — PROGRESS NOTES
collar bone area after shoulder surgery    Hyperlipidemia     Hypertension     Liver disease     enlarged liver - damaged with alcohol in past but no cirrhosis per patient    Pneumonia 7/24/2014    Suicidal thoughts     2015 admitted to  from [facility]    Thyroid disease     Type 2 diabetes mellitus without complication, without long-term current use of insulin ([facility] [address] 7/20/2019    Vomiting     Wears dentures     Wears glasses      SURGICAL HISTORY:  Past Surgical History:   Procedure Laterality Date    ABDOMEN SURGERY      x4 removed cysts and ovary removed    CARDIAC SURGERY      heart caths, no stents    CARPAL TUNNEL RELEASE Bilateral 2016    CHOLECYSTECTOMY, LAPAROSCOPIC  6/12/15    Dr. Malathi Robison, ESOPHAGUS      ELBOW SURGERY Right 10/7/2004    Dr. Julio Anthony Bilateral 2016    decompression   Jerome Trujillo TORITO with [address] [facility]    ROTATOR CUFF REPAIR  2004, 2014    right shoulder    SHOULDER SURGERY  2014    right    SKIN BIOPSY  2006    under left eye     SOCIAL HISTORY:  Social History     Tobacco Use    Smoking status: Current Every Day Smoker     Packs/day: 0.25     Years: 30.00     Pack years: 7.50     Types: Cigarettes     Start date: 4/22/1977    Smokeless tobacco: Never Used   Substance Use Topics    Alcohol use: No     Comment: none for 2 years    Drug use: Not Currently     Frequency: 1.0 times per week     Types: Cocaine     Comment: smokes cocaine daily - per patient as of 5/14/2019     ALLERGIES:  Allergies   Allergen Reactions    Seasonal Other (See Comments)     sneezing     FAMILY HISTORY:  Family History   Problem Relation Age of Onset    Cancer Mother     Heart Disease Mother     High Blood Pressure Mother     High Cholesterol Mother     Cancer Father         brain    Depression Father     Heart Disease Father     High Cholesterol Father     Mental Illness Father  Cancer Sister     Heart Attack Sister     Heart Disease Maternal Uncle     High Cholesterol Maternal Uncle     Diabetes Maternal Grandmother     Heart Disease Maternal Grandmother     High Cholesterol Maternal Grandmother     Early Death Maternal Grandfather     Heart Disease Maternal Grandfather     High Cholesterol Maternal Grandfather     Stroke Maternal Grandfather     Heart Disease Paternal Grandmother     High Cholesterol Paternal Grandmother     Heart Disease Paternal Grandfather     High Cholesterol Paternal Grandfather      CURRENT MEDICATIONS:  Current Outpatient Medications   Medication Sig Dispense Refill    hydrochlorothiazide (HYDRODIURIL) 25 MG tablet TAKE 1 TABLET BY MOUTH TWICE DAILY 60 tablet 5    metFORMIN (GLUCOPHAGE) 1000 MG tablet Take 1,000 mg by mouth 2 times daily (with meals)      acetaminophen (TYLENOL) 325 MG tablet Take 650 mg by mouth every 6 hours as needed for Pain      topiramate (TOPAMAX) 50 MG tablet Take 1 tablet by mouth daily 30 tablet 3    Pancrelipase, Lip-Prot-Amyl, (ZENPEP) 40944-532645 units CPEP 2 caps 8/12/1600 1 cap at  capsule 0    niacin 250 MG extended release capsule Take 2 capsules by mouth nightly 30 capsule 0    albuterol (PROVENTIL) (2.5 MG/3ML) 0.083% nebulizer solution Take 3 mLs by nebulization every 6 hours as needed for Wheezing 120 each 0    levothyroxine (SYNTHROID) 75 MCG tablet Take 1 tablet by mouth daily everyday except Sunday take 150 mcg.  30 tablet 0    fluticasone (FLONASE) 50 MCG/ACT nasal spray 1 spray by Each Nostril route 2 times daily 1 Bottle 0    ferrous sulfate 325 (65 Fe) MG tablet Take 1 tablet by mouth 2 times daily 30 tablet 0    albuterol sulfate HFA (VENTOLIN HFA) 108 (90 Base) MCG/ACT inhaler Inhale 2 puffs into the lungs every 6 hours as needed for Wheezing 1 Inhaler 0    folic acid (FOLVITE) 1 MG tablet Take 1 mg by mouth daily      ARIPiprazole (ABILIFY) 2 MG tablet Take 2 mg by mouth daily  famotidine (PEPCID) 40 MG tablet Take 40 mg by mouth daily      atorvastatin (LIPITOR) 40 MG tablet Take 40 mg by mouth daily      SPIRIVA HANDIHALER 18 MCG inhalation capsule   0    escitalopram (LEXAPRO) 20 MG tablet Take 20 mg by mouth daily      escitalopram (LEXAPRO) 10 MG tablet Take 10 mg by mouth daily      fenofibrate 160 MG tablet Take 160 mg by mouth daily      aspirin 81 MG chewable tablet Take 1 tablet by mouth daily 30 tablet 3    nystatin (MYCOSTATIN) POWD powder Apply 1 each topically 2 times daily      clopidogrel (PLAVIX) 75 MG tablet TAKE 1 TABLET BY MOUTH DAILY 28 tablet 3    hydrOXYzine (VISTARIL) 50 MG capsule Take 50 mg by mouth 3 times daily as needed for Itching or Anxiety       traZODone (DESYREL) 100 MG tablet Take 2 tablets by mouth nightly (Patient taking differently: Take 300 mg by mouth nightly ) 30 tablet 0    QUEtiapine (SEROQUEL XR) 300 MG extended release tablet Take 2 tablets by mouth nightly (Patient taking differently: Take 600 mg by mouth nightly ) 30 tablet 0    isosorbide dinitrate (ISORDIL) 10 MG tablet TAKE 1 TABLET BY MOUTH 3 TIMES DAILY 270 tablet 3    carvedilol (COREG) 3.125 MG tablet TAKE 1 TABLET BY MOUTH 2 TIMES DAILY (WITH MEALS) 180 tablet 3    pantoprazole (PROTONIX) 40 MG tablet Take 1 tablet by mouth daily Indications: Gastroesophageal Reflux Disease 10 tablet 0     No current facility-administered medications for this visit. Tuan AUGUSTINE   Review of Systems   Constitutional: Negative for activity change, chills and fever. HENT: Positive for congestion. Eyes: Negative. Respiratory: Positive for cough. Negative for chest tightness, shortness of breath and wheezing. Cardiovascular: Negative for chest pain, palpitations and leg swelling. Gastrointestinal: Negative for abdominal pain, diarrhea, nausea and vomiting. Genitourinary: Negative. Musculoskeletal: Positive for gait problem. Skin: Negative.     Hematological: Does not which are inherent in voice recognition technology. **       Final report electronically signed by Dr. Jose Branham on 12/21/2019 5:40 AM                   Assessment      Diagnosis Orders   1. Stage 1 mild COPD by GOLD classification ([facility] [address]     2. Smoker unmotivated to quit     3.  Impaired memory        Plan   Continue daily Spiriva  PRN albuterol   Encouraged to stay active  Recommend stop smoking , unfortunately no desire to quit  Qualifies for annual LDCT lung screen - can not be done until after 12/21/2020 ( will order at follow up )     Will see Barbara Paris in: 6 months    Electronically signed by BA Teixeira CNP on 2/10/2020 at 12:48 PM

## 2020-02-12 LAB
BILIRUBIN URINE: NORMAL
BLOOD, URINE: NORMAL
CHOLESTEROL, TOTAL: 187 MG/DL
CHOLESTEROL/HDL RATIO: ABNORMAL
CLARITY: NORMAL
COLOR: YELLOW
GLUCOSE URINE: NEGATIVE
HDLC SERPL-MCNC: 29 MG/DL (ref 35–70)
KETONES, URINE: NEGATIVE
LDL CHOLESTEROL CALCULATED: ABNORMAL
LEUKOCYTE ESTERASE, URINE: NORMAL
NITRITE, URINE: NEGATIVE
PH UA: 7 (ref 4.5–8)
PROTEIN UA: NEGATIVE
SPECIFIC GRAVITY UA: 1.01 (ref 1–1.03)
TRIGL SERPL-MCNC: 650 MG/DL
UROBILINOGEN, URINE: NORMAL
VLDLC SERPL CALC-MCNC: ABNORMAL MG/DL

## 2020-02-16 ENCOUNTER — TELEPHONE (OUTPATIENT)
Dept: UROLOGY | Age: 65
End: 2020-02-16

## 2020-02-16 NOTE — TELEPHONE ENCOUNTER
24-hour urine results show elevated calcium, oxalate, sodium, uric acid, and low citrate. This will be discussed with her at her next visit.

## 2020-02-20 ENCOUNTER — TELEPHONE (OUTPATIENT)
Dept: UROLOGY | Age: 65
End: 2020-02-20

## 2020-02-20 NOTE — TELEPHONE ENCOUNTER
Patient's appointment cancelled for today as Dr Jad Rodriguez was called out to do surgery. Spoke with Bubba Mcburney at [facility] and let him know that we would call back with the reschedule date after talking to Alvarez Dias about a date.

## 2020-02-24 ENCOUNTER — TELEPHONE (OUTPATIENT)
Dept: UROLOGY | Age: 65
End: 2020-02-24

## 2020-02-24 NOTE — TELEPHONE ENCOUNTER
Patient rescheduled for InterStim Trial on 03-. Ivis Morales at [facility] assisted living notified. Order for UA given. Wilfred notified.

## 2020-03-12 ENCOUNTER — PROCEDURE VISIT (OUTPATIENT)
Dept: UROLOGY | Age: 65
End: 2020-03-12
Payer: MEDICAID

## 2020-03-12 VITALS
DIASTOLIC BLOOD PRESSURE: 78 MMHG | WEIGHT: 163.1 LBS | SYSTOLIC BLOOD PRESSURE: 122 MMHG | BODY MASS INDEX: 27.84 KG/M2 | HEIGHT: 64 IN

## 2020-03-12 LAB
BILIRUBIN URINE: NEGATIVE
BILIRUBIN URINE: NORMAL
BLOOD URINE, POC: ABNORMAL
BLOOD, URINE: NEGATIVE
CHARACTER, URINE: CLEAR
CLARITY: CLEAR
COLOR, URINE: YELLOW
COLOR: YELLOW
GLUCOSE URINE: 100 MG/DL
GLUCOSE URINE: NORMAL
KETONES, URINE: NEGATIVE
KETONES, URINE: NEGATIVE
LEUKOCYTE CLUMPS, URINE: ABNORMAL
LEUKOCYTE ESTERASE, URINE: NORMAL
NITRITE, URINE: NEGATIVE
NITRITE, URINE: NEGATIVE
PH UA: 6 (ref 4.5–8)
PH, URINE: 5.5 (ref 5–9)
PROTEIN UA: NEGATIVE
PROTEIN, URINE: NEGATIVE MG/DL
SPECIFIC GRAVITY UA: 1.01 (ref 1–1.03)
SPECIFIC GRAVITY, URINE: 1.02 (ref 1–1.03)
UROBILINOGEN, URINE: 0.2 EU/DL (ref 0–1)
UROBILINOGEN, URINE: NORMAL

## 2020-03-12 PROCEDURE — 81003 URINALYSIS AUTO W/O SCOPE: CPT | Performed by: UROLOGY

## 2020-03-12 PROCEDURE — 64561 IMPLANT NEUROELECTRODES: CPT | Performed by: UROLOGY

## 2020-03-12 NOTE — PROGRESS NOTES
26-year-old white female returns today for PNE testing. She also has a history of recurrent nephrolithiasis. Her 24-hour urine shows multiple abnormalities which will be discussed in a follow-up visit. Procedure note   After explaining what we would be doing, Donnie Aj was placed in prone position on the procedure table. Her lower back and upper gluteal region was prepped and she was draped in the standard fashion for surgery. Using sterile technique the position of needle placement was marked out and then the area where the needles would be advanced was instilled with local anesthetic. The location of needle placement was determined by using the standard length needle as a guide and then going 2 cm lateral to the midline on either side. Once we instilled local anesthetic I began advancing the standard needles on a 60° angle toward the S3 foramen. As the needle was initially advanced I bumped into bone and the needle was carefully pulled back and reinserted several times making very small adjustments in order to probe to find the S3 foramen. Once I was able to get into the S3 foramen on one side I then used this angulation as a guide in order to advance the needle on the opposite side. Once we were into the S3 foramen testing was performed and a good S3 response found. This was repeated on both sides with both needles and once we were comfortable that both needles were in the S3 foramen in good position we turned our attention to advancement of the leads. The leads were removed from their sheaths and one at a time were passed through the needle after removing the needle obturator. After advancing to the jared on the lead the lead was held in place as the needle was carefully removed leaving the lead in position through the S3 foramen. The lead obturator was then removed and the needle completely removed. This was repeated on both sides leaving the leads in good position.  Sterile dressings were then applied as per 's direction and the temporary device attached and instructions given. The temporary device will be tested over the next few days working with the representative from the company to make sure that we have adequate response before decision is made on implantation of the permanent device.

## 2020-03-12 NOTE — PATIENT INSTRUCTIONS
You may receive a survey regarding the care you received during your visit. Your input is valuable to us. We encourage you to complete and return your survey. We hope you will choose us in the future for your healthcare needs. Patient Education        Stopping Smoking: Care Instructions  Your Care Instructions  Cigarette smokers crave the nicotine in cigarettes. Giving it up is much harder than simply changing a habit. Your body has to stop craving the nicotine. It is hard to quit, but you can do it. There are many tools that people use to quit smoking. You may find that combining tools works best for you. There are several steps to quitting. First you get ready to quit. Then you get support to help you. After that, you learn new skills and behaviors to become a nonsmoker. For many people, a necessary step is getting and using medicine. Your doctor will help you set up the plan that best meets your needs. You may want to attend a smoking cessation program to help you quit smoking. When you choose a program, look for one that has proven success. Ask your doctor for ideas. You will greatly increase your chances of success if you take medicine as well as get counseling or join a cessation program.  Some of the changes you feel when you first quit tobacco are uncomfortable. Your body will miss the nicotine at first, and you may feel short-tempered and grumpy. You may have trouble sleeping or concentrating. Medicine can help you deal with these symptoms. You may struggle with changing your smoking habits and rituals. The last step is the tricky one: Be prepared for the smoking urge to continue for a time. This is a lot to deal with, but keep at it. You will feel better. Follow-up care is a key part of your treatment and safety. Be sure to make and go to all appointments, and call your doctor if you are having problems.  It's also a good idea to know your test results and keep a list of the medicines you take.  How can you care for yourself at home? · Ask your family, friends, and coworkers for support. You have a better chance of quitting if you have help and support. · Join a support group, such as Nicotine Anonymous, for people who are trying to quit smoking. · Consider signing up for a smoking cessation program, such as the American Lung Association's Freedom from Smoking program.  · Get text messaging support. Go to the website at www.smokefree. gov to sign up for the [facility] program.  · Set a quit date. Pick your date carefully so that it is not right in the middle of a big deadline or stressful time. Once you quit, do not even take a puff. Get rid of all ashtrays and lighters after your last cigarette. Clean your house and your clothes so that they do not smell of smoke. · Learn how to be a nonsmoker. Think about ways you can avoid those things that make you reach for a cigarette. ? Avoid situations that put you at greatest risk for smoking. For some people, it is hard to have a drink with friends without smoking. For others, they might skip a coffee break with coworkers who smoke. ? Change your daily routine. Take a different route to work or eat a meal in a different place. · Cut down on stress. Calm yourself or release tension by doing an activity you enjoy, such as reading a book, taking a hot bath, or gardening. · Talk to your doctor or pharmacist about nicotine replacement therapy, which replaces the nicotine in your body. You still get nicotine but you do not use tobacco. Nicotine replacement products help you slowly reduce the amount of nicotine you need. These products come in several forms, many of them available over-the-counter:  ? Nicotine patches  ? Nicotine gum and lozenges  ? Nicotine inhaler  · Ask your doctor about bupropion (Wellbutrin) or varenicline (Chantix), which are prescription medicines. They do not contain nicotine.  They help you by reducing withdrawal symptoms, such as

## 2020-03-13 ENCOUNTER — TELEPHONE (OUTPATIENT)
Dept: UROLOGY | Age: 65
End: 2020-03-13

## 2020-03-13 NOTE — TELEPHONE ENCOUNTER
Bairon Harrison from [facility] called asking about the patient. She thought she was supposed to have a fasting lab done because of what it said on the AVS.  I assured her there was no lab work that needed to be done. She then asked if the interstim was supposed to be plugged in? I told her YES. She said they had called Wilfred early this morning and not heard back.

## 2020-03-15 ENCOUNTER — TELEPHONE (OUTPATIENT)
Dept: UROLOGY | Age: 65
End: 2020-03-15

## 2020-04-20 RX ORDER — HYDROCHLOROTHIAZIDE 25 MG/1
TABLET ORAL
Qty: 60 TABLET | Refills: 1 | Status: SHIPPED | OUTPATIENT
Start: 2020-04-20 | End: 2021-02-20

## 2020-04-21 ENCOUNTER — VIRTUAL VISIT (OUTPATIENT)
Dept: NEUROLOGY | Age: 65
End: 2020-04-21
Payer: MEDICAID

## 2020-04-21 PROCEDURE — 99213 OFFICE O/P EST LOW 20 MIN: CPT | Performed by: NURSE PRACTITIONER

## 2020-04-21 RX ORDER — TOPIRAMATE 50 MG/1
50 TABLET, FILM COATED ORAL 2 TIMES DAILY
Qty: 60 TABLET | Refills: 3 | Status: SHIPPED | OUTPATIENT
Start: 2020-04-21 | End: 2021-02-20

## 2020-04-21 ASSESSMENT — ENCOUNTER SYMPTOMS
EYES NEGATIVE: 1
RESPIRATORY NEGATIVE: 1

## 2020-04-21 NOTE — PROGRESS NOTES
Reflux Disease  Jennifer Morrison MD       Social History     Tobacco Use    Smoking status: Current Every Day Smoker     Packs/day: 0.25     Years: 30.00     Pack years: 7.50     Types: Cigarettes     Start date: 4/22/1977    Smokeless tobacco: Never Used   Substance Use Topics    Alcohol use: No     Comment: none for 2 years    Drug use: Not Currently     Frequency: 1.0 times per week     Types: Cocaine     Comment: smokes cocaine daily - per patient as of 5/14/2019        Past Medical History:   Diagnosis Date    Acute renal failure with acute cortical necrosis (HCC) 12/21/2019    Allergic rhinitis     Arthritis     spine    Bipolar disorder ([address]     Blood circulation, collateral     Cancer ([address] 2011    cancerous polyps removed - Dr. Kishan Riley Colon polyps     COPD (chronic obstructive pulmonary disease) ([address] 7/24/2014    Coronary vasospasm ([facility] [address] 8/17/2019    Depression     Diverticulitis of colon     GERD (gastroesophageal reflux disease)     Hiatal hernia     History of arterial ischemic stroke 7/20/2019    History of colonoscopy 2002    History of kidney stones     Hx of blood clots 6/17/2014    PE and collar bone area after shoulder surgery    Hyperlipidemia     Hypertension     Liver disease     enlarged liver - damaged with alcohol in past but no cirrhosis per patient    Pneumonia 7/24/2014    Suicidal thoughts     2015 admitted to  from [facility]    Thyroid disease     Type 2 diabetes mellitus without complication, without long-term current use of insulin ([facility] [address] 7/20/2019    Vomiting     Wears dentures     Wears glasses    ,   Past Surgical History:   Procedure Laterality Date    ABDOMEN SURGERY      x4 removed cysts and ovary removed    CARDIAC SURGERY      heart caths, no stents    CARPAL TUNNEL RELEASE Bilateral 2016    CHOLECYSTECTOMY, LAPAROSCOPIC  6/12/15    Dr. Brody Charles, ESOPHAGUS      ELBOW SURGERY Right 10/7/2004     apparent distress      [] Abnormal-   Mental status  [x] Alert and awake  [x] Oriented to person/place/time [x]Able to follow commands      Eyes:  EOM    [x]  Normal  [] Abnormal-  Sclera  [x]  Normal  [] Abnormal -         Discharge [x]  None visible  [] Abnormal -    HENT:   [x] Normocephalic, atraumatic. [] Abnormal   [x] Mouth/Throat: Mucous membranes are moist.     External Ears [x] Normal  [] Abnormal-     Neck: [x] No visualized mass     Pulmonary/Chest: [x] Respiratory effort normal.  [x] No visualized signs of difficulty breathing or respiratory distress        [] Abnormal-      Musculoskeletal:   [x] Normal gait with no signs of ataxia         [x] Normal range of motion of neck        [] Abnormal-       Neurological:        [x] No Facial Asymmetry (Cranial nerve 7 motor function) (limited exam to video visit)          [x] No gaze palsy        [] Abnormal-         Skin:        [x] No significant exanthematous lesions or discoloration noted on facial skin         [] Abnormal-            Psychiatric:       [x] Normal Affect [x] No Hallucinations        [] Abnormal-     Other pertinent observable physical exam findings-     ASSESSMENT/PLAN:  1. Other headache syndrome    Her headaches are some better. She has noticed some improvement in the headache. She is on topamax. She denies any side effects to the medication. She denies any more stroke like symptoms. She reports that she slipped and fell approximately 3 weeks ago and hit her head. She reports her head is tender to the touch where she fell. We will arrange for her to undergo CT head WO contrast.  After detailed discussion with patient we agreed on the following plan. Plan:  1. CT head WO contrast  2. Change Topamax to 50 mg tablet two times a day. Take with plenty of fluids. 3. Continue with antiplatelets. 4. Modify risk factors for stroke (blood pressure, cholesterol, diabetes, smoking cessation etc.. Control)   5.  Report any new symptoms

## 2020-05-01 ENCOUNTER — APPOINTMENT (OUTPATIENT)
Dept: CT IMAGING | Age: 65
End: 2020-05-01
Payer: MEDICAID

## 2020-05-01 ENCOUNTER — HOSPITAL ENCOUNTER (OUTPATIENT)
Dept: CT IMAGING | Age: 65
Discharge: HOME | End: 2020-05-01
Payer: MEDICAID

## 2020-05-01 ENCOUNTER — TELEPHONE (OUTPATIENT)
Dept: NEUROLOGY | Age: 65
End: 2020-05-01

## 2020-05-01 PROCEDURE — 70450 CT HEAD/BRAIN W/O DYE: CPT

## 2020-05-01 PROCEDURE — G0297 LDCT FOR LUNG CA SCREEN: HCPCS

## 2020-05-12 ENCOUNTER — VIRTUAL VISIT (OUTPATIENT)
Dept: UROLOGY | Age: 65
End: 2020-05-12
Payer: MEDICAID

## 2020-05-12 PROCEDURE — 99214 OFFICE O/P EST MOD 30 MIN: CPT | Performed by: UROLOGY

## 2020-05-12 RX ORDER — PHENAZOPYRIDINE HYDROCHLORIDE 100 MG/1
200 TABLET, FILM COATED ORAL EVERY 8 HOURS PRN
Qty: 12 TABLET | Refills: 1 | Status: SHIPPED | OUTPATIENT
Start: 2020-05-12 | End: 2020-06-15

## 2020-05-12 ASSESSMENT — ENCOUNTER SYMPTOMS
CHEST TIGHTNESS: 0
COLOR CHANGE: 0
EYE REDNESS: 0
VOMITING: 0
EYE PAIN: 0
SHORTNESS OF BREATH: 0
ABDOMINAL PAIN: 0
FACIAL SWELLING: 0

## 2020-05-12 NOTE — PROGRESS NOTES
Jae Garcia is a 60 y.o. female evaluated via telephone on 5/12/2020. Consent:  She and/or health care decision maker is aware that that she may receive a bill for this telephone service, depending on her insurance coverage, and has provided verbal consent to proceed: Yes      Documentation:  I communicated with the patient and/or health care decision maker about urinary incontinence, nephrolithiasis, gross hematuria. .   Details of this discussion including any medical advice provided: 63-year-old white female who has the above urologic problems. She underwent PNE placement and testing in the office 03/12/2020. However she dislodged the lead and as such did not get an appropriate length of time to see if her urinary symptoms would improve. She continues to have intermittent gross hematuria. A CT scan of the abdomen pelvis with IV contrast had been previously obtained on 12/21/2019 which showed small stones in her left kidney without obstruction. Currently, she complains of dysuria. She did have a positive Proteus UTI in April, 2020, treated with appropriate antibiotic by her PCP. As far as the nephrolithiasis, her 24-hour urine results show a urinary calcium of 248 mg/day, urinary oxalate at 41 mg/day, both of which are elevated; her urinary citrate is abnormally low at 335 mg/day. Finally, her urinary sodium is elevated at 204. I need to give her a written copy of these results and treatment protocol for these problems. Impression: Gross hematuria. 2.  Nephrolithiasis. 3.  Severe urgency frequency and urge incontinence. 4.  Dysuria. Plan of care: 1. She agrees to come into the clinic for diagnostic cystoscopy. 2.  I will review her 24-hour urine results and treatment options during that office visit. 3.  She will need to have formal stage I sacral neurostimulator placement/testing in the operating room under IV sedation or general anesthesia.   Again this will be discussed during her next office visit. 4.  A urine analysis/reflex urine culture/sensitivity will be obtained from assisted living. I am faxing a written prescription for Pyridium 200 mg p.o. 3 times daily as needed until the culture results are available. I affirm this is a Patient Initiated Episode with a Patient who has not had a related appointment within my department in the past 7 days or scheduled within the next 24 hours.     Patient identification was verified at the start of the visit: Yes    Total Time: minutes: 21-30 minutes    Note: not billable if this call serves to triage the patient into an appointment for the relevant concern      [facility]

## 2020-05-17 ENCOUNTER — APPOINTMENT (OUTPATIENT)
Dept: CT IMAGING | Age: 65
End: 2020-05-17
Payer: MEDICAID

## 2020-05-17 ENCOUNTER — HOSPITAL ENCOUNTER (EMERGENCY)
Age: 65
Discharge: HOME | End: 2020-05-18
Attending: EMERGENCY MEDICINE
Payer: MEDICAID

## 2020-05-17 LAB
BASOPHILS # BLD: 0.6 %
BASOPHILS ABSOLUTE: 0 THOU/MM3 (ref 0–0.1)
EKG ATRIAL RATE: 68 BPM
EKG P AXIS: 47 DEGREES
EKG P-R INTERVAL: 142 MS
EKG Q-T INTERVAL: 474 MS
EKG QRS DURATION: 92 MS
EKG QTC CALCULATION (BAZETT): 504 MS
EKG R AXIS: 0 DEGREES
EKG T AXIS: 65 DEGREES
EKG VENTRICULAR RATE: 68 BPM
EOSINOPHIL # BLD: 1.1 %
EOSINOPHILS ABSOLUTE: 0.1 THOU/MM3 (ref 0–0.4)
ERYTHROCYTE [DISTWIDTH] IN BLOOD BY AUTOMATED COUNT: 13.5 % (ref 11.5–14.5)
ERYTHROCYTE [DISTWIDTH] IN BLOOD BY AUTOMATED COUNT: 38.4 FL (ref 35–45)
HCT VFR BLD CALC: 39.3 % (ref 37–47)
HEMOGLOBIN: 12.9 GM/DL (ref 12–16)
IMMATURE GRANS (ABS): 0.03 THOU/MM3 (ref 0–0.07)
IMMATURE GRANULOCYTES: 0.4 %
LYMPHOCYTES # BLD: 47.6 %
LYMPHOCYTES ABSOLUTE: 3.4 THOU/MM3 (ref 1–4.8)
MCH RBC QN AUTO: 26.2 PG (ref 26–33)
MCHC RBC AUTO-ENTMCNC: 32.8 GM/DL (ref 32.2–35.5)
MCV RBC AUTO: 79.7 FL (ref 81–99)
MONOCYTES # BLD: 6.9 %
MONOCYTES ABSOLUTE: 0.5 THOU/MM3 (ref 0.4–1.3)
NUCLEATED RED BLOOD CELLS: 0 /100 WBC
PLATELET # BLD: 280 THOU/MM3 (ref 130–400)
PMV BLD AUTO: 8.8 FL (ref 9.4–12.4)
RBC # BLD: 4.93 MILL/MM3 (ref 4.2–5.4)
SEG NEUTROPHILS: 43.4 %
SEGMENTED NEUTROPHILS ABSOLUTE COUNT: 3.1 THOU/MM3 (ref 1.8–7.7)
WBC # BLD: 7.2 THOU/MM3 (ref 4.8–10.8)

## 2020-05-17 PROCEDURE — 83690 ASSAY OF LIPASE: CPT

## 2020-05-17 PROCEDURE — 83735 ASSAY OF MAGNESIUM: CPT

## 2020-05-17 PROCEDURE — 36415 COLL VENOUS BLD VENIPUNCTURE: CPT

## 2020-05-17 PROCEDURE — 84484 ASSAY OF TROPONIN QUANT: CPT

## 2020-05-17 PROCEDURE — 85610 PROTHROMBIN TIME: CPT

## 2020-05-17 PROCEDURE — 80076 HEPATIC FUNCTION PANEL: CPT

## 2020-05-17 PROCEDURE — 99285 EMERGENCY DEPT VISIT HI MDM: CPT

## 2020-05-17 PROCEDURE — 74176 CT ABD & PELVIS W/O CONTRAST: CPT

## 2020-05-17 PROCEDURE — 96365 THER/PROPH/DIAG IV INF INIT: CPT

## 2020-05-17 PROCEDURE — 85025 COMPLETE CBC W/AUTO DIFF WBC: CPT

## 2020-05-17 PROCEDURE — 93005 ELECTROCARDIOGRAM TRACING: CPT | Performed by: EMERGENCY MEDICINE

## 2020-05-17 PROCEDURE — 80048 BASIC METABOLIC PNL TOTAL CA: CPT

## 2020-05-17 ASSESSMENT — PAIN SCALES - GENERAL: PAINLEVEL_OUTOF10: 7

## 2020-05-17 ASSESSMENT — PAIN DESCRIPTION - LOCATION: LOCATION: ABDOMEN

## 2020-05-18 VITALS
OXYGEN SATURATION: 95 % | HEART RATE: 64 BPM | DIASTOLIC BLOOD PRESSURE: 56 MMHG | RESPIRATION RATE: 16 BRPM | HEIGHT: 64 IN | WEIGHT: 157 LBS | TEMPERATURE: 98 F | BODY MASS INDEX: 26.8 KG/M2 | SYSTOLIC BLOOD PRESSURE: 104 MMHG

## 2020-05-18 LAB
ALBUMIN SERPL-MCNC: 4.4 G/DL (ref 3.5–5.1)
ALP BLD-CCNC: 35 U/L (ref 38–126)
ALT SERPL-CCNC: 24 U/L (ref 11–66)
ANION GAP SERPL CALCULATED.3IONS-SCNC: 14 MEQ/L (ref 8–16)
AST SERPL-CCNC: 19 U/L (ref 5–40)
BACTERIA: ABNORMAL
BILIRUB SERPL-MCNC: 0.3 MG/DL (ref 0.3–1.2)
BILIRUBIN DIRECT: < 0.2 MG/DL (ref 0–0.3)
BILIRUBIN URINE: NEGATIVE
BLOOD, URINE: NEGATIVE
BUN BLDV-MCNC: 14 MG/DL (ref 7–22)
CALCIUM SERPL-MCNC: 10.6 MG/DL (ref 8.5–10.5)
CASTS: ABNORMAL /LPF
CASTS: ABNORMAL /LPF
CHARACTER, URINE: ABNORMAL
CHLORIDE BLD-SCNC: 93 MEQ/L (ref 98–111)
CO2: 26 MEQ/L (ref 23–33)
COLOR: YELLOW
CREAT SERPL-MCNC: 0.7 MG/DL (ref 0.4–1.2)
CRYSTALS: ABNORMAL
EPITHELIAL CELLS, UA: ABNORMAL /HPF
GFR SERPL CREATININE-BSD FRML MDRD: 85 ML/MIN/1.73M2
GLUCOSE BLD-MCNC: 112 MG/DL (ref 70–108)
GLUCOSE, URINE: NEGATIVE MG/DL
HEMOCCULT STL QL: NEGATIVE
INR BLD: 1.08 (ref 0.85–1.13)
KETONES, URINE: NEGATIVE
LEUKOCYTE ESTERASE, URINE: ABNORMAL
LIPASE: 187.8 U/L (ref 5.6–51.3)
MAGNESIUM: 1.3 MG/DL (ref 1.6–2.4)
MISCELLANEOUS LAB TEST RESULT: ABNORMAL
NITRITE, URINE: NEGATIVE
OSMOLALITY CALCULATION: 267.6 MOSMOL/KG (ref 275–300)
PH UA: 7.5 (ref 5–9)
POTASSIUM REFLEX MAGNESIUM: 2.9 MEQ/L (ref 3.5–5.2)
PROTEIN UA: NEGATIVE MG/DL
RBC URINE: ABNORMAL /HPF
RENAL EPITHELIAL, UA: ABNORMAL
SODIUM BLD-SCNC: 133 MEQ/L (ref 135–145)
SPECIFIC GRAVITY UA: 1.01 (ref 1–1.03)
TOTAL PROTEIN: 6.5 G/DL (ref 6.1–8)
TROPONIN T: < 0.01 NG/ML
TROPONIN T: < 0.01 NG/ML
UROBILINOGEN, URINE: 1 EU/DL (ref 0–1)
WBC UA: ABNORMAL /HPF
YEAST: ABNORMAL

## 2020-05-18 PROCEDURE — 2580000003 HC RX 258: Performed by: EMERGENCY MEDICINE

## 2020-05-18 PROCEDURE — 81001 URINALYSIS AUTO W/SCOPE: CPT

## 2020-05-18 PROCEDURE — 84484 ASSAY OF TROPONIN QUANT: CPT

## 2020-05-18 PROCEDURE — 6370000000 HC RX 637 (ALT 250 FOR IP): Performed by: EMERGENCY MEDICINE

## 2020-05-18 PROCEDURE — 93010 ELECTROCARDIOGRAM REPORT: CPT | Performed by: INTERNAL MEDICINE

## 2020-05-18 PROCEDURE — 36415 COLL VENOUS BLD VENIPUNCTURE: CPT

## 2020-05-18 PROCEDURE — 6360000002 HC RX W HCPCS: Performed by: EMERGENCY MEDICINE

## 2020-05-18 PROCEDURE — 82272 OCCULT BLD FECES 1-3 TESTS: CPT

## 2020-05-18 PROCEDURE — 96365 THER/PROPH/DIAG IV INF INIT: CPT

## 2020-05-18 RX ORDER — POTASSIUM CHLORIDE 20 MEQ/1
20 TABLET, EXTENDED RELEASE ORAL 2 TIMES DAILY WITH MEALS
Status: DISCONTINUED | OUTPATIENT
Start: 2020-05-18 | End: 2020-05-18

## 2020-05-18 RX ORDER — 0.9 % SODIUM CHLORIDE 0.9 %
1000 INTRAVENOUS SOLUTION INTRAVENOUS ONCE
Status: COMPLETED | OUTPATIENT
Start: 2020-05-18 | End: 2020-05-18

## 2020-05-18 RX ORDER — DICYCLOMINE HYDROCHLORIDE 10 MG/1
10 CAPSULE ORAL 4 TIMES DAILY
Qty: 30 CAPSULE | Refills: 0 | Status: SHIPPED | OUTPATIENT
Start: 2020-05-18 | End: 2020-06-15

## 2020-05-18 RX ORDER — POTASSIUM CHLORIDE 750 MG/1
40 TABLET, FILM COATED, EXTENDED RELEASE ORAL ONCE
Status: COMPLETED | OUTPATIENT
Start: 2020-05-18 | End: 2020-05-18

## 2020-05-18 RX ADMIN — SODIUM CHLORIDE 1000 ML: 9 INJECTION, SOLUTION INTRAVENOUS at 01:41

## 2020-05-18 RX ADMIN — POTASSIUM CHLORIDE 40 MEQ: 750 TABLET, FILM COATED, EXTENDED RELEASE ORAL at 02:16

## 2020-05-18 RX ADMIN — MAGNESIUM SULFATE HEPTAHYDRATE 1 G: 500 INJECTION, SOLUTION INTRAMUSCULAR; INTRAVENOUS at 01:41

## 2020-05-18 NOTE — ED NOTES
Bed: 009A  Expected date: 5/17/20  Expected time: 11:08 PM  Means of arrival: Shweta  Comments:     Yara Carcamo RN  05/17/20 1958

## 2020-05-18 NOTE — ED PROVIDER NOTES
26 23 - 33 meq/L    Glucose 112 (H) 70 - 108 mg/dL    BUN 14 7 - 22 mg/dL    CREATININE 0.7 0.4 - 1.2 mg/dL    Calcium 10.6 (H) 8.5 - 10.5 mg/dL   Hepatic Function Panel   Result Value Ref Range    Alb 4.4 3.5 - 5.1 g/dL    Total Bilirubin 0.3 0.3 - 1.2 mg/dL    Bilirubin, Direct <0.2 0.0 - 0.3 mg/dL    Alkaline Phosphatase 35 (L) 38 - 126 U/L    AST 19 5 - 40 U/L    ALT 24 11 - 66 U/L    Total Protein 6.5 6.1 - 8.0 g/dL   Lipase   Result Value Ref Range    Lipase 187.8 (H) 5.6 - 51.3 U/L   Protime-INR   Result Value Ref Range    INR 1.08 0.85 - 1.13   Troponin   Result Value Ref Range    Troponin T < 0.010 ng/ml   Anion Gap   Result Value Ref Range    Anion Gap 14.0 8.0 - 16.0 meq/L   Osmolality   Result Value Ref Range    Osmolality Calc 267.6 (L) 275.0 - 300 mOsmol/kg   Glomerular Filtration Rate, Estimated   Result Value Ref Range    Est, Glom Filt Rate 85 (A) ml/min/1.73m2   Magnesium   Result Value Ref Range    Magnesium 1.3 (L) 1.6 - 2.4 mg/dL   Blood occult stool screen #1   Result Value Ref Range    OCCULT BLOOD FECAL Negative    Troponin   Result Value Ref Range    Troponin T < 0.010 ng/ml   EKG 12 Lead   Result Value Ref Range    Ventricular Rate 68 BPM    Atrial Rate 68 BPM    P-R Interval 142 ms    QRS Duration 92 ms    Q-T Interval 474 ms    QTc Calculation (Bazett) 504 ms    P Axis 47 degrees    R Axis 0 degrees    T Axis 65 degrees       IMAGING STUDIES  CT ABDOMEN PELVIS WO CONTRAST Additional Contrast? None   Final Result   No acute inflammatory or infectious process in the abdomen or pelvis. No evidence of bowel obstruction. Moderate amount of stool in the colon. Subcentimeter nonobstructing left renal calculi. No hydroureteronephrosis. Hepatomegaly with hepatic steatosis and focal fatty sparing in the medial segment left lobe. Additional nonemergent findings as detailed above. **This report has been created using voice recognition software.  It may contain minor errors which are

## 2020-05-18 NOTE — ED NOTES
Pt resting with eyes closed. Breathing easy and unlabored. VSS. Will continue to monitor.      Temo Ronquillo RN  05/18/20 [id number]

## 2020-05-19 ENCOUNTER — CARE COORDINATION (OUTPATIENT)
Dept: CARE COORDINATION | Age: 65
End: 2020-05-19

## 2020-05-20 NOTE — CARE COORDINATION
Patient contacted regarding recent discharge and COVID-19 risk   Care Transition Nurse/ Ambulatory Care Manager contacted the patient by telephone to perform post discharge assessment. Patient has following risk factors of: heart failure, COPD and diabetes. CTN/ACM reviewed discharge instructions, medical action plan and red flags related to discharge diagnosis. Reviewed and educated them on any new and changed medications related to discharge diagnosis. Advised obtaining a 90-day supply of all daily and as-needed medications. Education provided regarding infection prevention, and signs and symptoms of COVID-19 and when to seek medical attention with patient who verbalized understanding. Discussed exposure protocols and quarantine from [address] you at higher risk for severe illness 2019 and given an opportunity for questions and concerns. The patient agrees to contact the COVID-19 hotline [phone] or PCP office for questions related to their healthcare. CTN/ACM provided contact information for future reference. From CDC: Are you at higher risk for severe illness?  Wash your hands often.  Avoid close contact (6 feet, which is about two arm lengths) with people who are sick.  Put distance between yourself and other people if COVID-19 is spreading in your community.  Clean and disinfect frequently touched surfaces.  Avoid all cruise travel and non-essential air travel.  Call your healthcare professional if you have concerns about COVID-19 and your underlying condition or if you are sick. Patient unable to complete LOOP enrollment. For more information on steps you can take to protect yourself, see CDC's How to [place] for follow-up call in 7-14 days based on severity of symptoms and risk factors. Patient called for covid-19 f/u s/p recent ED visit for abdominal pain and hypotension.   Patient was noted to have trouble with her phone and difficulty hearing caller so call was kept brief. Patient stated she is feeling better and she denied any questions re: her discharge instructions. Patient does live in a group home/AL facility. Patient denied any new/change/worsneing of symptoms. Covid-19 education was reviewed and patient verbalized understanding. Patient denied any other questions, concerns, or needs.

## 2020-05-30 ENCOUNTER — APPOINTMENT (OUTPATIENT)
Dept: GENERAL RADIOLOGY | Age: 65
End: 2020-05-30
Payer: MEDICAID

## 2020-05-30 ENCOUNTER — HOSPITAL ENCOUNTER (EMERGENCY)
Age: 65
Discharge: HOME | End: 2020-05-31
Attending: EMERGENCY MEDICINE
Payer: MEDICAID

## 2020-05-30 LAB
ALBUMIN SERPL-MCNC: 4.4 G/DL (ref 3.5–5.1)
ALP BLD-CCNC: 38 U/L (ref 38–126)
ALT SERPL-CCNC: 22 U/L (ref 11–66)
AMYLASE: 84 U/L (ref 20–104)
ANION GAP SERPL CALCULATED.3IONS-SCNC: 11 MEQ/L (ref 8–16)
AST SERPL-CCNC: 15 U/L (ref 5–40)
BACTERIA: ABNORMAL /HPF
BASOPHILS # BLD: 0.7 %
BASOPHILS ABSOLUTE: 0.1 THOU/MM3 (ref 0–0.1)
BILIRUB SERPL-MCNC: 0.6 MG/DL (ref 0.3–1.2)
BILIRUBIN URINE: NEGATIVE
BLOOD, URINE: NEGATIVE
BUN BLDV-MCNC: 15 MG/DL (ref 7–22)
CALCIUM SERPL-MCNC: 11.2 MG/DL (ref 8.5–10.5)
CASTS 2: ABNORMAL /LPF
CASTS UA: ABNORMAL /LPF
CHARACTER, URINE: ABNORMAL
CHLORIDE BLD-SCNC: 95 MEQ/L (ref 98–111)
CO2: 25 MEQ/L (ref 23–33)
COLOR: YELLOW
CREAT SERPL-MCNC: 0.8 MG/DL (ref 0.4–1.2)
CRYSTALS, UA: ABNORMAL
EKG ATRIAL RATE: 69 BPM
EKG P AXIS: 63 DEGREES
EKG P-R INTERVAL: 140 MS
EKG Q-T INTERVAL: 428 MS
EKG QRS DURATION: 86 MS
EKG QTC CALCULATION (BAZETT): 458 MS
EKG R AXIS: 8 DEGREES
EKG T AXIS: 60 DEGREES
EKG VENTRICULAR RATE: 69 BPM
EOSINOPHIL # BLD: 1 %
EOSINOPHILS ABSOLUTE: 0.1 THOU/MM3 (ref 0–0.4)
EPITHELIAL CELLS, UA: ABNORMAL /HPF
ERYTHROCYTE [DISTWIDTH] IN BLOOD BY AUTOMATED COUNT: 14.4 % (ref 11.5–14.5)
ERYTHROCYTE [DISTWIDTH] IN BLOOD BY AUTOMATED COUNT: 41.2 FL (ref 35–45)
GFR SERPL CREATININE-BSD FRML MDRD: 72 ML/MIN/1.73M2
GLUCOSE BLD-MCNC: 104 MG/DL (ref 70–108)
GLUCOSE URINE: NEGATIVE MG/DL
HCT VFR BLD CALC: 37.1 % (ref 37–47)
HEMOGLOBIN: 12.2 GM/DL (ref 12–16)
IMMATURE GRANS (ABS): 0.05 THOU/MM3 (ref 0–0.07)
IMMATURE GRANULOCYTES: 0.5 %
KETONES, URINE: NEGATIVE
LEUKOCYTE ESTERASE, URINE: ABNORMAL
LIPASE: 66.1 U/L (ref 5.6–51.3)
LYMPHOCYTES # BLD: 37.6 %
LYMPHOCYTES ABSOLUTE: 3.7 THOU/MM3 (ref 1–4.8)
MAGNESIUM: 1.7 MG/DL (ref 1.6–2.4)
MCH RBC QN AUTO: 26.5 PG (ref 26–33)
MCHC RBC AUTO-ENTMCNC: 32.9 GM/DL (ref 32.2–35.5)
MCV RBC AUTO: 80.5 FL (ref 81–99)
MISCELLANEOUS 2: ABNORMAL
MONOCYTES # BLD: 6.7 %
MONOCYTES ABSOLUTE: 0.7 THOU/MM3 (ref 0.4–1.3)
NITRITE, URINE: NEGATIVE
NUCLEATED RED BLOOD CELLS: 0 /100 WBC
OSMOLALITY CALCULATION: 263.8 MOSMOL/KG (ref 275–300)
PH UA: 7 (ref 5–9)
PLATELET # BLD: 269 THOU/MM3 (ref 130–400)
PMV BLD AUTO: 9.1 FL (ref 9.4–12.4)
POTASSIUM REFLEX MAGNESIUM: 3.3 MEQ/L (ref 3.5–5.2)
PROTEIN UA: ABNORMAL
RBC # BLD: 4.61 MILL/MM3 (ref 4.2–5.4)
RBC URINE: ABNORMAL /HPF
RENAL EPITHELIAL, UA: ABNORMAL
SEG NEUTROPHILS: 53.5 %
SEGMENTED NEUTROPHILS ABSOLUTE COUNT: 5.3 THOU/MM3 (ref 1.8–7.7)
SODIUM BLD-SCNC: 131 MEQ/L (ref 135–145)
SPECIFIC GRAVITY, URINE: 1.02 (ref 1–1.03)
TOTAL PROTEIN: 6.9 G/DL (ref 6.1–8)
TROPONIN T: < 0.01 NG/ML
UROBILINOGEN, URINE: 1 EU/DL (ref 0–1)
WBC # BLD: 9.9 THOU/MM3 (ref 4.8–10.8)
WBC UA: ABNORMAL /HPF
YEAST: ABNORMAL

## 2020-05-30 PROCEDURE — 81001 URINALYSIS AUTO W/SCOPE: CPT

## 2020-05-30 PROCEDURE — 71046 X-RAY EXAM CHEST 2 VIEWS: CPT

## 2020-05-30 PROCEDURE — 87077 CULTURE AEROBIC IDENTIFY: CPT

## 2020-05-30 PROCEDURE — 2580000003 HC RX 258: Performed by: STUDENT IN AN ORGANIZED HEALTH CARE EDUCATION/TRAINING PROGRAM

## 2020-05-30 PROCEDURE — 36415 COLL VENOUS BLD VENIPUNCTURE: CPT

## 2020-05-30 PROCEDURE — 83690 ASSAY OF LIPASE: CPT

## 2020-05-30 PROCEDURE — 6370000000 HC RX 637 (ALT 250 FOR IP): Performed by: STUDENT IN AN ORGANIZED HEALTH CARE EDUCATION/TRAINING PROGRAM

## 2020-05-30 PROCEDURE — 93005 ELECTROCARDIOGRAM TRACING: CPT | Performed by: STUDENT IN AN ORGANIZED HEALTH CARE EDUCATION/TRAINING PROGRAM

## 2020-05-30 PROCEDURE — 87086 URINE CULTURE/COLONY COUNT: CPT

## 2020-05-30 PROCEDURE — 83735 ASSAY OF MAGNESIUM: CPT

## 2020-05-30 PROCEDURE — 80053 COMPREHEN METABOLIC PANEL: CPT

## 2020-05-30 PROCEDURE — 82150 ASSAY OF AMYLASE: CPT

## 2020-05-30 PROCEDURE — 84484 ASSAY OF TROPONIN QUANT: CPT

## 2020-05-30 PROCEDURE — 87186 SC STD MICRODIL/AGAR DIL: CPT

## 2020-05-30 PROCEDURE — 99284 EMERGENCY DEPT VISIT MOD MDM: CPT

## 2020-05-30 PROCEDURE — 85025 COMPLETE CBC W/AUTO DIFF WBC: CPT

## 2020-05-30 RX ORDER — SODIUM CHLORIDE 9 MG/ML
1000 INJECTION, SOLUTION INTRAVENOUS CONTINUOUS
Status: DISCONTINUED | OUTPATIENT
Start: 2020-05-30 | End: 2020-05-31 | Stop reason: HOSPADM

## 2020-05-30 RX ADMIN — SODIUM CHLORIDE 1000 ML: 9 INJECTION, SOLUTION INTRAVENOUS at 22:47

## 2020-05-30 RX ADMIN — LIDOCAINE HYDROCHLORIDE: 20 SOLUTION ORAL; TOPICAL at 23:14

## 2020-05-30 ASSESSMENT — PAIN DESCRIPTION - LOCATION: LOCATION: ABDOMEN

## 2020-05-30 ASSESSMENT — PAIN DESCRIPTION - PAIN TYPE: TYPE: ACUTE PAIN

## 2020-05-30 ASSESSMENT — PAIN DESCRIPTION - PROGRESSION
CLINICAL_PROGRESSION: NOT CHANGED
CLINICAL_PROGRESSION: GRADUALLY IMPROVING

## 2020-05-30 ASSESSMENT — PAIN DESCRIPTION - DESCRIPTORS: DESCRIPTORS: DISCOMFORT

## 2020-05-30 ASSESSMENT — PAIN SCALES - GENERAL
PAINLEVEL_OUTOF10: 7
PAINLEVEL_OUTOF10: 8

## 2020-05-30 ASSESSMENT — PAIN DESCRIPTION - ONSET: ONSET: ON-GOING

## 2020-05-30 ASSESSMENT — PAIN DESCRIPTION - ORIENTATION: ORIENTATION: MID;UPPER

## 2020-05-30 ASSESSMENT — PAIN DESCRIPTION - FREQUENCY: FREQUENCY: CONTINUOUS

## 2020-05-31 VITALS
OXYGEN SATURATION: 96 % | HEIGHT: 62 IN | TEMPERATURE: 98.2 F | BODY MASS INDEX: 28.34 KG/M2 | HEART RATE: 66 BPM | DIASTOLIC BLOOD PRESSURE: 66 MMHG | SYSTOLIC BLOOD PRESSURE: 103 MMHG | WEIGHT: 154 LBS | RESPIRATION RATE: 19 BRPM

## 2020-05-31 PROCEDURE — 93010 ELECTROCARDIOGRAM REPORT: CPT | Performed by: INTERNAL MEDICINE

## 2020-05-31 ASSESSMENT — ENCOUNTER SYMPTOMS
DIARRHEA: 0
CONSTIPATION: 0
RHINORRHEA: 0
VOMITING: 1
NAUSEA: 1
COUGH: 0
EYE PAIN: 0
SHORTNESS OF BREATH: 0
ABDOMINAL PAIN: 1

## 2020-05-31 ASSESSMENT — PAIN SCALES - GENERAL: PAINLEVEL_OUTOF10: 4

## 2020-05-31 ASSESSMENT — PAIN DESCRIPTION - PROGRESSION: CLINICAL_PROGRESSION: GRADUALLY IMPROVING

## 2020-05-31 NOTE — ED PROVIDER NOTES
Dr. Jennifer Henriquez on 5/30/2020 11:37 PM            LABS:   Labs Reviewed   CBC WITH AUTO DIFFERENTIAL - Abnormal; Notable for the following components:       Result Value    MCV 80.5 (*)     MPV 9.1 (*)     All other components within normal limits   COMPREHENSIVE METABOLIC PANEL W/ REFLEX TO MG FOR LOW K - Abnormal; Notable for the following components:    Sodium 131 (*)     Potassium reflex Magnesium 3.3 (*)     Chloride 95 (*)     Calcium 11.2 (*)     All other components within normal limits   LIPASE - Abnormal; Notable for the following components:    Lipase 66.1 (*)     All other components within normal limits   OSMOLALITY - Abnormal; Notable for the following components:    Osmolality Calc 263.8 (*)     All other components within normal limits   GLOMERULAR FILTRATION RATE, ESTIMATED - Abnormal; Notable for the following components:    Est, Glom Filt Rate 72 (*)     All other components within normal limits   URINE WITH REFLEXED MICRO - Abnormal; Notable for the following components:    Protein, UA TRACE (*)     Leukocyte Esterase, Urine LARGE (*)     Character, Urine CLOUDY (*)     All other components within normal limits   CULTURE, REFLEXED, URINE    Narrative:     Source: urine, clean catch       Site:           Current Antibiotics: none   TROPONIN   MAGNESIUM   AMYLASE   ANION GAP       EMERGENCY DEPARTMENT COURSE:   Vitals:    Vitals:    05/30/20 2223 05/30/20 2314 05/31/20 0030   BP: 102/74 99/71 103/66   Pulse: 73 64 66   Resp: 18 20 19   Temp: 98.2 °F (36.8 °C)     TempSrc: Oral     SpO2: 96% 96% 96%   Weight: 154 lb (69.9 kg)     Height: 5' 2\" (1.575 m)         MDM  Patient was seen and evaluated. Patient was in no acute distress. Patient with recent onset nausea and vomiting. Similar symptoms 2 weeks ago, workup showing only electrolyte abnormalities. Appropriate labs and imaging was ordered. Patient given a cocktail for symptoms.  Workup was unremarkable other than mild electrolyte abnormalities,

## 2020-05-31 NOTE — ED TRIAGE NOTES
Patient presents to the ED from [facility] with report of abdominal pain x 2 hours. Patient reports a long history of abdominal pain and hernia. Patient reports increased abdominal pain over the last two hours with N/V. Patient reports blood-tinged emesis. Patient was recently seen in the ED 05/17/2020. Patient reports BM this morning, soft. Patient is alert and oriented. Respirations easy and unlabored. Provider at bedside for patient evaluation.

## 2020-05-31 NOTE — ED NOTES
Bed: 010A  Expected date: 5/30/20  Expected time: 10:07 PM  Means of arrival: Shweta  Comments:  Marissa Wall RN  05/30/20 1633

## 2020-05-31 NOTE — ED NOTES
LACP on location for transport back to [place]. Report called to [facility] with Assisted Living.       Makenzie Mcgrath RN  05/31/20 0111

## 2020-06-01 ENCOUNTER — CARE COORDINATION (OUTPATIENT)
Dept: CARE COORDINATION | Age: 65
End: 2020-06-01

## 2020-06-01 LAB
ORGANISM: ABNORMAL
URINE CULTURE REFLEX: ABNORMAL

## 2020-06-01 NOTE — CARE COORDINATION
Patient contacted regarding recent discharge and COVID-19 risk. Discussed COVID-19 related testing which was not done at this time. Test results were not done. Patient informed of results, if available? N/A     Care Transition Nurse/ Ambulatory Care Manager contacted the patient by telephone to perform post discharge assessment. Verified name and  with patient as identifiers. Patient has following risk factors of: heart failure, asthma and diabetes. CTN/ACM reviewed discharge instructions, medical action plan and red flags related to discharge diagnosis. Reviewed and educated them on any new and changed medications related to discharge diagnosis. Advised obtaining a 90-day supply of all daily and as-needed medications. Education provided regarding infection prevention, and signs and symptoms of COVID-19 and when to seek medical attention with patient who verbalized understanding. Discussed exposure protocols and quarantine from [address] you at higher risk for severe illness  and given an opportunity for questions and concerns. The patient agrees to contact the COVID-19 hotline [phone] or PCP office for questions related to their healthcare. CTN/ACM provided contact information for future reference. From CDC: Are you at higher risk for severe illness?  Wash your hands often.  Avoid close contact (6 feet, which is about two arm lengths) with people who are sick.  Put distance between yourself and other people if COVID-19 is spreading in your community.  Clean and disinfect frequently touched surfaces.  Avoid all cruise travel and non-essential air travel.  Call your healthcare professional if you have concerns about COVID-19 and your underlying condition or if you are sick.     Patient unable to complete LOOP program.     For more information on steps you can take to protect yourself, see CDC's How to [id number] [place] [address] for follow-up call in 7-14 days based on

## 2020-06-02 ENCOUNTER — TELEPHONE (OUTPATIENT)
Dept: PHARMACY | Age: 65
End: 2020-06-02

## 2020-06-15 ENCOUNTER — OFFICE VISIT (OUTPATIENT)
Dept: CARDIOLOGY CLINIC | Age: 65
End: 2020-06-15
Payer: MEDICAID

## 2020-06-15 ENCOUNTER — CARE COORDINATION (OUTPATIENT)
Dept: CARE COORDINATION | Age: 65
End: 2020-06-15

## 2020-06-15 VITALS
SYSTOLIC BLOOD PRESSURE: 108 MMHG | BODY MASS INDEX: 25.95 KG/M2 | HEART RATE: 68 BPM | DIASTOLIC BLOOD PRESSURE: 62 MMHG | HEIGHT: 64 IN | WEIGHT: 152 LBS

## 2020-06-15 PROCEDURE — 99213 OFFICE O/P EST LOW 20 MIN: CPT | Performed by: INTERNAL MEDICINE

## 2020-06-15 RX ORDER — POTASSIUM CHLORIDE 20 MEQ/1
TABLET, EXTENDED RELEASE ORAL DAILY
COMMUNITY
Start: 2020-05-20

## 2020-06-15 RX ORDER — AMMONIUM LACTATE 12 G/100G
CREAM TOPICAL PRN
COMMUNITY
End: 2020-06-30

## 2020-06-15 NOTE — CARE COORDINATION
The Care Transitions episode from 6/1/2020 will auto resolve. Discussed COVID-19 related testing which was not done at this time. Test results were not done. Patient informed of results, if available? N/A     Patient was called for final COVID-19 follow up s/p recent ED visit. Patient was not available at the time of my call and no voicemail option available. No further outreach scheduled with this CTN/ACM. Episode of Care will auto resolve. Patient has this CTN/ACM contact information if future needs arise.

## 2020-06-15 NOTE — PROGRESS NOTES
Pt here for 6 mo check up
grandfather, and paternal grandmother; High Blood Pressure in her mother; High Cholesterol in her father, maternal grandfather, maternal grandmother, maternal uncle, mother, paternal grandfather, and paternal grandmother; Mental Illness in her father; Stroke in her maternal grandfather. There is no family history of bicuspid aortic valve, aneurysms, heart transplant, pacemakers, defibrillators, or sudden cardiac death. Past Surgical History   Past Surgical History:   Procedure Laterality Date    ABDOMEN SURGERY      x4 removed cysts and ovary removed    CARDIAC SURGERY      heart caths, no stents    CARPAL TUNNEL RELEASE Bilateral 2016    CHOLECYSTECTOMY, LAPAROSCOPIC  6/12/15    Dr. Baldemar Schilder, ESOPHAGUS      ELBOW SURGERY Right 10/7/2004    Dr. Cole Quinn Bilateral 2016    decompression   Dorthea Formosa    Dr. Mauricio Funk [facility]    ROTATOR CUFF REPAIR  2004, 2014    right shoulder    SHOULDER SURGERY  2014    right    SKIN BIOPSY  2006    under left eye       Review of Systems   Constitutional: Negative for chills and fever  HENT: Negative for congestion, sinus pressure, sneezing and sore throat. Eyes: Negative for pain, discharge, redness and itching. Respiratory: Negative for apnea, cough  Gastrointestinal: Negative for blood in stool, constipation, diarrhea   Endocrine: Negative for cold intolerance, heat intolerance, polydipsia. Genitourinary: Negative for dysuria, enuresis, flank pain and hematuria. Musculoskeletal: Negative for arthralgias, joint swelling and neck pain. Neurological: Negative for numbness and headaches. Psychiatric/Behavioral: Negative for agitation, confusion, decreased concentration and dysphoric mood.      Objective:     /62   Pulse 68   Ht 5' 4\" (1.626 m)   Wt 152 lb (68.9 kg)   BMI 26.09 kg/m²     Wt Readings from Last 3 Encounters:   06/15/20 152 lb

## 2020-06-16 NOTE — ED PROVIDER NOTES
[facility] EMERGENCY DEPT      CHIEF COMPLAINT       Chief Complaint   Patient presents with    Abdominal Pain    Hematemesis       Nurses Notes reviewed and I agree except as noted in the HPI. HISTORY OF PRESENT ILLNESS    Marianna Kendrick is a 60 y.o. female who presents abd pain, hematemesis. Patient reporting dark stool. Onset: Acute  Duration: Prior to arrival  Timing: Intermittent  Location of Pain: Abdominal  Intesity/severity: Mild  Modifying Factors: Apparent  Relieved by;  Previous Episodes; Tx Before arrival: None  REVIEW OF SYSTEMS      Review of Systems   Constitutional: Negative for fever, chills, diaphoresis and fatigue. HENT: Negative for congestion, drooling, facial swelling and sore throat. Eyes: Negative for photophobia, pain and discharge. Respiratory: Negative for cough, shortness of breath, wheezing and stridor. Cardiovascular: Negative for chest pain, palpitations and leg swelling. Gastrointestinal: Positive for abdominal pain, blood in stool and abdominal distention. Genitourinary: Negative for dysuria, urgency, hematuria and difficulty urinating. Musculoskeletal: Negative for gait problem, neck pain and neck stiffness. Skin; No rash, No itching  Neurological: Negative for seizures, weakness and numbness. Hematological: Negative for adenopathy. Does not bruise/bleed easily. Psychiatric/Behavioral: Negative for hallucinations, confusion and agitation.      PAST MEDICAL HISTORY    has a past medical history of Acute renal failure with acute cortical necrosis ([address] Allergic rhinitis, Arthritis, Bipolar disorder ([address] Blood circulation, collateral, Cancer ([address] Colon polyps, COPD (chronic obstructive pulmonary disease) ([address] Coronary vasospasm (HCC), Depression, Diverticulitis of colon, GERD (gastroesophageal reflux disease), Hiatal hernia, History of arterial ischemic stroke, History of colonoscopy, History of kidney stones, Hx of blood clots, Hyperlipidemia, Hypertension, Liver disease, Pneumonia, Suicidal thoughts, Thyroid disease, Type 2 diabetes mellitus without complication, without long-term current use of insulin ([address] Vomiting, Wears dentures, and Wears glasses. SURGICAL HISTORY      has a past surgical history that includes Mandible fracture surgery (1984); shoulder surgery (2014); Colonoscopy (2011); Dilatation, esophagus; Elbow surgery (Right, 10/7/2004); Rotator cuff repair (2004, 2014); skin biopsy (2006); Cholecystectomy, laparoscopic (6/12/15); Elbow surgery (Bilateral, 2016); Carpal tunnel release (Bilateral, 2016); Hysterectomy (1998); Cardiac surgery; and Abdomen surgery.     CURRENT MEDICATIONS       Discharge Medication List as of 5/18/2020  2:46 AM      CONTINUE these medications which have NOT CHANGED    Details   fluticasone-salmeterol (ADVAIR) 250-50 MCG/DOSE AEPB Historical Med      phenazopyridine (PYRIDIUM) 100 MG tablet Take 2 tablets by mouth every 8 hours as needed for Pain, Disp-12 tablet, R-1Print      topiramate (TOPAMAX) 50 MG tablet Take 1 tablet by mouth 2 times daily, Disp-60 tablet, R-3Normal      hydroCHLOROthiazide (HYDRODIURIL) 25 MG tablet TAKE 1 TABLET BY MOUTH TWICE DAILY, Disp-60 tablet, R-1Normal      acetaminophen (TYLENOL) 325 MG tablet Take 650 mg by mouth every 6 hours as needed for PainHistorical Med      Pancrelipase, Lip-Prot-Amyl, (ZENPEP) 58816-375005 units CPEP 2 caps 8/12/1600 1 cap at HS, Disp-120 capsule, R-0Print      niacin 250 MG extended release capsule Take 2 capsules by mouth nightly, Disp-30 capsule, R-0NO PRINT      albuterol (PROVENTIL) (2.5 MG/3ML) 0.083% nebulizer solution Take 3 mLs by nebulization every 6 hours as needed for Wheezing, Disp-120 each, R-0NO PRINT      levothyroxine (SYNTHROID) 75 MCG tablet Take 1 tablet by mouth daily everyday except Sunday take 150 mcg., Disp-30 tablet, R-0NO PRINT      fluticasone (FLONASE) 50 MCG/ACT nasal spray 1 spray by Each Nostril route 2 Gastroesophageal Reflux Disease, Disp-10 tablet, R-0Print       !! - Potential duplicate medications found. Please discuss with provider. ALLERGIES     is allergic to seasonal.    FAMILY HISTORY     She indicated that her mother is . She indicated that her father is . She indicated that her sister is alive. She indicated that her brother is alive. She indicated that the status of her maternal grandmother is unknown. She indicated that the status of her maternal grandfather is unknown. She indicated that the status of her paternal grandmother is unknown. She indicated that the status of her paternal grandfather is unknown. She indicated that the status of her maternal uncle is unknown.   family history includes Cancer in her father, mother, and sister; Depression in her father; Diabetes in her maternal grandmother; Early Death in her maternal grandfather; Heart Attack in her sister; Heart Disease in her father, maternal grandfather, maternal grandmother, maternal uncle, mother, paternal grandfather, and paternal grandmother; High Blood Pressure in her mother; High Cholesterol in her father, maternal grandfather, maternal grandmother, maternal uncle, mother, paternal grandfather, and paternal grandmother; Mental Illness in her father; Stroke in her maternal grandfather. SOCIAL HISTORY      reports that she has been smoking cigarettes. She started smoking about 43 years ago. She has a 7.50 pack-year smoking history. She has never used smokeless tobacco. She reports previous drug use. Frequency: 1.00 time per week. Drug: Cocaine. She reports that she does not drink alcohol. PHYSICAL EXAM     INITIAL VITALS:  height is 5' 4\" (1.626 m) and weight is 157 lb (71.2 kg). Her oral temperature is 98 °F (36.7 °C). Her blood pressure is 104/56 (abnormal) and her pulse is 64. Her respiration is 16 and oxygen saturation is 95%. Physical Exam   Constitutional:  well-developed and well-nourished. HENT: Head: Normocephalic, atraumatic, Bilateral external ears normal, Oropharynx mosit, No oral exudates, Nose normal.   Eyes: PERRL, EOMI, Conjunctiva normal, No discharge. No scleral icterus  Neck: Normal range of motion, No tenderness, Supple  Cardiovascular: Normal rate, regular rhythm, S1 normal and S2 normal.  Exam reveals no gallop. Pulmonary/Chest: Effort normal and breath sounds normal. No accessory muscle usage or stridor. No respiratory distress. no wheezes. has no rales. exhibits no tenderness. Abdominal: Soft. Bowel sounds are normal.  exhibits no distension. There is diffuse tenderness, nonacute abd. There is no rebound and no guarding. Negative rectal exam, no bleeding noted. Occult testing done, negative exam.  Extremities: No edema, no tenderness, no cyanosis, no clubbing. Musculoskeletal: Good range of motion in major joints is observed. No major deformities noted. Neurological: Alert and oriented ×3, normal motor function, normal sensory function, no focal deficits. GCS 15  Skin: Skin is warm, dry and intact. No rash noted. No erythema. Psychiatric: Affect normal, judgment normal, mood normal.  DIFFERENTIAL DIAGNOSIS:       DIAGNOSTIC RESULTS     EKG: All EKG's are interpreted by the Emergency Department Physician who either signs or Co-signs this chart in the absence of a cardiologist.      RADIOLOGY: non-plain film images(s) such as CT, Ultrasound and MRI are read by the radiologist.  Plain radiographic images are visualized and preliminarily interpreted by the emergency physician unless otherwise stated below.       LABS:   Labs Reviewed   CBC WITH AUTO DIFFERENTIAL - Abnormal; Notable for the following components:       Result Value    MCV 79.7 (*)     MPV 8.8 (*)     All other components within normal limits   BASIC METABOLIC PANEL W/ REFLEX TO MG FOR LOW K - Abnormal; Notable for the following components:    Sodium 133 (*)     Potassium reflex Magnesium 2.9 (*)     Chloride

## 2020-06-22 RX ORDER — PHENAZOPYRIDINE HYDROCHLORIDE 100 MG/1
TABLET, FILM COATED ORAL
Qty: 12 TABLET | Refills: 1 | Status: SHIPPED | OUTPATIENT
Start: 2020-06-22 | End: 2020-06-30 | Stop reason: ALTCHOICE

## 2020-06-30 ENCOUNTER — PROCEDURE VISIT (OUTPATIENT)
Dept: UROLOGY | Age: 65
End: 2020-06-30
Payer: MEDICAID

## 2020-06-30 ENCOUNTER — TELEPHONE (OUTPATIENT)
Dept: UROLOGY | Age: 65
End: 2020-06-30

## 2020-06-30 VITALS — BODY MASS INDEX: 25.78 KG/M2 | HEIGHT: 64 IN | WEIGHT: 151 LBS | TEMPERATURE: 96.9 F

## 2020-06-30 LAB
BILIRUBIN URINE: NEGATIVE
BLOOD URINE, POC: ABNORMAL
CHARACTER, URINE: CLEAR
COLOR, URINE: YELLOW
GLUCOSE URINE: NEGATIVE MG/DL
KETONES, URINE: NEGATIVE
LEUKOCYTE CLUMPS, URINE: ABNORMAL
NITRITE, URINE: NEGATIVE
PH, URINE: 6.5 (ref 5–9)
PROTEIN, URINE: 30 MG/DL
SPECIFIC GRAVITY, URINE: 1.02 (ref 1–1.03)
UROBILINOGEN, URINE: 1 EU/DL (ref 0–1)

## 2020-06-30 PROCEDURE — 52000 CYSTOURETHROSCOPY: CPT | Performed by: UROLOGY

## 2020-06-30 PROCEDURE — 99215 OFFICE O/P EST HI 40 MIN: CPT | Performed by: UROLOGY

## 2020-06-30 PROCEDURE — 81003 URINALYSIS AUTO W/O SCOPE: CPT | Performed by: UROLOGY

## 2020-06-30 NOTE — TELEPHONE ENCOUNTER
Dr. Patrick Mcgowan please see message below. Patient was just cleared for EGD with Dr. Tita Joseph.

## 2020-06-30 NOTE — PROGRESS NOTES
1 cipro given prior to cystoscopy. 10ml of 2%Lidocaine urojet inserted into the urethra and sterile swab was inserted for 5 minutes before cystoscopy procedure. Wing Craig

## 2020-06-30 NOTE — PATIENT INSTRUCTIONS
take.  How can you care for yourself at home? · Ask your family, friends, and coworkers for support. You have a better chance of quitting if you have help and support. · Join a support group, such as Nicotine Anonymous, for people who are trying to quit smoking. · Consider signing up for a smoking cessation program, such as the American Lung Association's Freedom from Smoking program.  · Get text messaging support. Go to the website at www.smokefree. gov to sign up for the [facility] program.  · Set a quit date. Pick your date carefully so that it is not right in the middle of a big deadline or stressful time. Once you quit, do not even take a puff. Get rid of all ashtrays and lighters after your last cigarette. Clean your house and your clothes so that they do not smell of smoke. · Learn how to be a nonsmoker. Think about ways you can avoid those things that make you reach for a cigarette. ? Avoid situations that put you at greatest risk for smoking. For some people, it is hard to have a drink with friends without smoking. For others, they might skip a coffee break with coworkers who smoke. ? Change your daily routine. Take a different route to work or eat a meal in a different place. · Cut down on stress. Calm yourself or release tension by doing an activity you enjoy, such as reading a book, taking a hot bath, or gardening. · Talk to your doctor or pharmacist about nicotine replacement therapy, which replaces the nicotine in your body. You still get nicotine but you do not use tobacco. Nicotine replacement products help you slowly reduce the amount of nicotine you need. These products come in several forms, many of them available over-the-counter:  ? Nicotine patches  ? Nicotine gum and lozenges  ? Nicotine inhaler  · Ask your doctor about bupropion (Wellbutrin) or varenicline (Chantix), which are prescription medicines. They do not contain nicotine.  They help you by reducing withdrawal symptoms, such as stress and anxiety. · Some people find hypnosis, acupuncture, and massage helpful for ending the smoking habit. · Eat a healthy diet and get regular exercise. Having healthy habits will help your body move past its craving for nicotine. · Be prepared to keep trying. Most people are not successful the first few times they try to quit. Do not get mad at yourself if you smoke again. Make a list of things you learned and think about when you want to try again, such as next week, next month, or next year. Where can you learn more? Go to https://Audicustroyeb.TRELYS. org and sign in to your [employer] account. Enter K431 in the [employer] box to learn more about \"Stopping Smoking: Care Instructions. \"     If you do not have an account, please click on the \"Sign Up Now\" link. Current as of: March 12, 2020               Content Version: 12.5  © 2974-4226 Healthwise, Incorporated. Care instructions adapted under license by [facility] [address] If you have questions about a medical condition or this instruction, always ask your healthcare professional. [address] any warranty or liability for your use of this information.

## 2020-07-02 NOTE — TELEPHONE ENCOUNTER
Form in Dr. Dave Yip box to sign. Just clarifying, did you want Plavix to be stopped indefinitely and for pt to only remain on ASA 81?

## 2020-07-07 ENCOUNTER — HOSPITAL ENCOUNTER (OUTPATIENT)
Age: 65
Discharge: HOME | End: 2020-07-07
Payer: MEDICAID

## 2020-07-07 PROCEDURE — U0002 COVID-19 LAB TEST NON-CDC: HCPCS

## 2020-07-07 NOTE — PROGRESS NOTES
Arrival time: 10am  Directions given:yes  Who will be transporting:  on aging  Who will be coming with patient:  on aging   Need to have someone with patient to sign post-op instruction sheet if MAC or    General anesthesia  NPO: take heart and BP medications am of surgery with small sip of water  Is patient oriented: YES  Does patient have POA: YES SON BUT HE IS NOT AVAILABLE   If patient has POA need to bring POA papers  Is patient ambulatory: YES    Does patient use assistive devices:NO  Is patient non-weight bearing:YES    How many people does it take to move patient:NONE  Height: 5' 6\"  Weight 150LB  Fax: MAR, allergy list, medical/surgical history    General instructions:  NPO after midnight  [employer] and drivers license  Wear comfortable clean clothing  Do not bring jewelry   Shower night before and morning of surgery with a liquid antibacterial soap  Follow all instructions given by your physician   needed at discharge  Name of nurse you spoke with from nursing home: [facility]

## 2020-07-08 LAB
PERFORMING LAB: NORMAL
REPORT: NORMAL
SARS-COV-2: NOT DETECTED

## 2020-07-08 RX ORDER — AMMONIUM LACTATE 12 G/100G
CREAM TOPICAL PRN
COMMUNITY
End: 2020-09-14

## 2020-07-08 RX ORDER — OXYMETAZOLINE HCL 0.05 G/100ML
2 SPRAY NASAL 2 TIMES DAILY
Status: ON HOLD | COMMUNITY
End: 2020-11-04

## 2020-07-08 RX ORDER — ANTACID TABLETS 500 MG/1
TABLET, CHEWABLE ORAL
COMMUNITY
End: 2021-02-20

## 2020-07-08 RX ORDER — ACETAMINOPHEN 325 MG/1
TABLET ORAL EVERY 6 HOURS PRN
COMMUNITY

## 2020-07-08 RX ORDER — LIDOCAINE 50 MG/G
OINTMENT TOPICAL PRN
COMMUNITY
End: 2020-09-14

## 2020-07-08 RX ORDER — PHENAZOPYRIDINE HYDROCHLORIDE 100 MG/1
100 TABLET, FILM COATED ORAL 3 TIMES DAILY PRN
COMMUNITY
End: 2020-09-14

## 2020-07-08 NOTE — TELEPHONE ENCOUNTER
Patient's nurse Niki Bray called from assisted living asking if patient can hold Plavix 3 days after procedure.     Notified her that Plavix is to be stopped indefinitely

## 2020-07-09 ENCOUNTER — PREP FOR PROCEDURE (OUTPATIENT)
Dept: UROLOGY | Age: 65
End: 2020-07-09

## 2020-07-09 RX ORDER — SODIUM CHLORIDE 9 MG/ML
INJECTION, SOLUTION INTRAVENOUS CONTINUOUS
Status: CANCELLED | OUTPATIENT
Start: 2020-07-13

## 2020-07-13 ENCOUNTER — ANESTHESIA (OUTPATIENT)
Dept: OPERATING ROOM | Age: 65
End: 2020-07-13
Payer: MEDICAID

## 2020-07-13 ENCOUNTER — HOSPITAL ENCOUNTER (OUTPATIENT)
Age: 65
Setting detail: OUTPATIENT SURGERY
Discharge: HOME | End: 2020-07-13
Attending: UROLOGY | Admitting: UROLOGY
Payer: MEDICAID

## 2020-07-13 ENCOUNTER — ANESTHESIA EVENT (OUTPATIENT)
Dept: OPERATING ROOM | Age: 65
End: 2020-07-13
Payer: MEDICAID

## 2020-07-13 VITALS
DIASTOLIC BLOOD PRESSURE: 76 MMHG | OXYGEN SATURATION: 96 % | RESPIRATION RATE: 18 BRPM | SYSTOLIC BLOOD PRESSURE: 139 MMHG | BODY MASS INDEX: 25.64 KG/M2 | TEMPERATURE: 98 F | HEART RATE: 83 BPM | WEIGHT: 150.2 LBS | HEIGHT: 64 IN

## 2020-07-13 VITALS — OXYGEN SATURATION: 100 % | SYSTOLIC BLOOD PRESSURE: 120 MMHG | DIASTOLIC BLOOD PRESSURE: 63 MMHG

## 2020-07-13 LAB
GLUCOSE BLD-MCNC: 104 MG/DL (ref 70–108)
GLUCOSE BLD-MCNC: 123 MG/DL (ref 70–108)

## 2020-07-13 PROCEDURE — 3700000000 HC ANESTHESIA ATTENDED CARE: Performed by: UROLOGY

## 2020-07-13 PROCEDURE — 2580000003 HC RX 258

## 2020-07-13 PROCEDURE — 7100000010 HC PHASE II RECOVERY - FIRST 15 MIN: Performed by: UROLOGY

## 2020-07-13 PROCEDURE — 3600000013 HC SURGERY LEVEL 3 ADDTL 15MIN: Performed by: UROLOGY

## 2020-07-13 PROCEDURE — 7100000011 HC PHASE II RECOVERY - ADDTL 15 MIN: Performed by: UROLOGY

## 2020-07-13 PROCEDURE — 6360000002 HC RX W HCPCS

## 2020-07-13 PROCEDURE — 2709999900 HC NON-CHARGEABLE SUPPLY: Performed by: UROLOGY

## 2020-07-13 PROCEDURE — 52204 CYSTOSCOPY W/BIOPSY(S): CPT | Performed by: UROLOGY

## 2020-07-13 PROCEDURE — 7100000001 HC PACU RECOVERY - ADDTL 15 MIN: Performed by: UROLOGY

## 2020-07-13 PROCEDURE — 2720000010 HC SURG SUPPLY STERILE: Performed by: UROLOGY

## 2020-07-13 PROCEDURE — 88305 TISSUE EXAM BY PATHOLOGIST: CPT

## 2020-07-13 PROCEDURE — 2500000003 HC RX 250 WO HCPCS

## 2020-07-13 PROCEDURE — 3600000003 HC SURGERY LEVEL 3 BASE: Performed by: UROLOGY

## 2020-07-13 PROCEDURE — 82948 REAGENT STRIP/BLOOD GLUCOSE: CPT

## 2020-07-13 PROCEDURE — 3700000001 HC ADD 15 MINUTES (ANESTHESIA): Performed by: UROLOGY

## 2020-07-13 PROCEDURE — 6370000000 HC RX 637 (ALT 250 FOR IP): Performed by: UROLOGY

## 2020-07-13 PROCEDURE — 6360000002 HC RX W HCPCS: Performed by: ANESTHESIOLOGY

## 2020-07-13 PROCEDURE — 7100000000 HC PACU RECOVERY - FIRST 15 MIN: Performed by: UROLOGY

## 2020-07-13 RX ORDER — LIDOCAINE HCL/PF 100 MG/5ML
SYRINGE (ML) INJECTION PRN
Status: DISCONTINUED | OUTPATIENT
Start: 2020-07-13 | End: 2020-07-13 | Stop reason: SDUPTHER

## 2020-07-13 RX ORDER — MEPERIDINE HYDROCHLORIDE 25 MG/ML
12.5 INJECTION INTRAMUSCULAR; INTRAVENOUS; SUBCUTANEOUS EVERY 5 MIN PRN
Status: DISCONTINUED | OUTPATIENT
Start: 2020-07-13 | End: 2020-07-13 | Stop reason: HOSPADM

## 2020-07-13 RX ORDER — ONDANSETRON 2 MG/ML
4 INJECTION INTRAMUSCULAR; INTRAVENOUS
Status: DISCONTINUED | OUTPATIENT
Start: 2020-07-13 | End: 2020-07-13 | Stop reason: HOSPADM

## 2020-07-13 RX ORDER — HYDROCODONE BITARTRATE AND ACETAMINOPHEN 5; 325 MG/1; MG/1
2 TABLET ORAL ONCE
Status: COMPLETED | OUTPATIENT
Start: 2020-07-13 | End: 2020-07-13

## 2020-07-13 RX ORDER — LABETALOL 20 MG/4 ML (5 MG/ML) INTRAVENOUS SYRINGE
5 EVERY 10 MIN PRN
Status: DISCONTINUED | OUTPATIENT
Start: 2020-07-13 | End: 2020-07-13 | Stop reason: HOSPADM

## 2020-07-13 RX ORDER — FENTANYL CITRATE 50 UG/ML
25 INJECTION, SOLUTION INTRAMUSCULAR; INTRAVENOUS EVERY 5 MIN PRN
Status: DISCONTINUED | OUTPATIENT
Start: 2020-07-13 | End: 2020-07-13 | Stop reason: HOSPADM

## 2020-07-13 RX ORDER — DEXAMETHASONE SODIUM PHOSPHATE 4 MG/ML
INJECTION, SOLUTION INTRA-ARTICULAR; INTRALESIONAL; INTRAMUSCULAR; INTRAVENOUS; SOFT TISSUE PRN
Status: DISCONTINUED | OUTPATIENT
Start: 2020-07-13 | End: 2020-07-13 | Stop reason: SDUPTHER

## 2020-07-13 RX ORDER — PROPOFOL 10 MG/ML
INJECTION, EMULSION INTRAVENOUS PRN
Status: DISCONTINUED | OUTPATIENT
Start: 2020-07-13 | End: 2020-07-13 | Stop reason: SDUPTHER

## 2020-07-13 RX ORDER — FENTANYL CITRATE 50 UG/ML
INJECTION, SOLUTION INTRAMUSCULAR; INTRAVENOUS PRN
Status: DISCONTINUED | OUTPATIENT
Start: 2020-07-13 | End: 2020-07-13 | Stop reason: SDUPTHER

## 2020-07-13 RX ORDER — CEFUROXIME AXETIL 250 MG/1
250 TABLET ORAL 2 TIMES DAILY
Qty: 4 TABLET | Refills: 0 | Status: SHIPPED | OUTPATIENT
Start: 2020-07-13 | End: 2020-07-15

## 2020-07-13 RX ORDER — SODIUM CHLORIDE 9 MG/ML
INJECTION, SOLUTION INTRAVENOUS CONTINUOUS
Status: DISCONTINUED | OUTPATIENT
Start: 2020-07-13 | End: 2020-07-13 | Stop reason: HOSPADM

## 2020-07-13 RX ORDER — FENTANYL CITRATE 50 UG/ML
50 INJECTION, SOLUTION INTRAMUSCULAR; INTRAVENOUS EVERY 5 MIN PRN
Status: DISCONTINUED | OUTPATIENT
Start: 2020-07-13 | End: 2020-07-13 | Stop reason: HOSPADM

## 2020-07-13 RX ORDER — ONDANSETRON 2 MG/ML
INJECTION INTRAMUSCULAR; INTRAVENOUS PRN
Status: DISCONTINUED | OUTPATIENT
Start: 2020-07-13 | End: 2020-07-13 | Stop reason: SDUPTHER

## 2020-07-13 RX ORDER — PROMETHAZINE HYDROCHLORIDE 25 MG/ML
6.25 INJECTION, SOLUTION INTRAMUSCULAR; INTRAVENOUS
Status: DISCONTINUED | OUTPATIENT
Start: 2020-07-13 | End: 2020-07-13 | Stop reason: HOSPADM

## 2020-07-13 RX ADMIN — CEFAZOLIN 2 G: 10 INJECTION, POWDER, FOR SOLUTION INTRAVENOUS at 13:00

## 2020-07-13 RX ADMIN — ONDANSETRON HYDROCHLORIDE 4 MG: 4 INJECTION, SOLUTION INTRAMUSCULAR; INTRAVENOUS at 13:17

## 2020-07-13 RX ADMIN — PROPOFOL 150 MG: 10 INJECTION, EMULSION INTRAVENOUS at 12:57

## 2020-07-13 RX ADMIN — Medication 60 MG: at 12:57

## 2020-07-13 RX ADMIN — DEXAMETHASONE SODIUM PHOSPHATE 10 MG: 4 INJECTION, SOLUTION INTRAMUSCULAR; INTRAVENOUS at 13:09

## 2020-07-13 RX ADMIN — SODIUM CHLORIDE: 9 INJECTION, SOLUTION INTRAVENOUS at 10:39

## 2020-07-13 RX ADMIN — FENTANYL CITRATE 50 MCG: 50 INJECTION INTRAMUSCULAR; INTRAVENOUS at 13:36

## 2020-07-13 RX ADMIN — HYDROCODONE BITARTRATE AND ACETAMINOPHEN 2 TABLET: 5; 325 TABLET ORAL at 14:33

## 2020-07-13 RX ADMIN — FENTANYL CITRATE 50 MCG: 50 INJECTION, SOLUTION INTRAMUSCULAR; INTRAVENOUS at 12:57

## 2020-07-13 ASSESSMENT — PULMONARY FUNCTION TESTS
PIF_VALUE: 1
PIF_VALUE: 2
PIF_VALUE: 3
PIF_VALUE: 1
PIF_VALUE: 2
PIF_VALUE: 4
PIF_VALUE: 2
PIF_VALUE: 21
PIF_VALUE: 0
PIF_VALUE: 3
PIF_VALUE: 2
PIF_VALUE: 3
PIF_VALUE: 3
PIF_VALUE: 2
PIF_VALUE: 2
PIF_VALUE: 21
PIF_VALUE: 3
PIF_VALUE: 2
PIF_VALUE: 4
PIF_VALUE: 0
PIF_VALUE: 18
PIF_VALUE: 2

## 2020-07-13 ASSESSMENT — PAIN DESCRIPTION - LOCATION: LOCATION: ABDOMEN

## 2020-07-13 ASSESSMENT — PAIN SCALES - GENERAL
PAINLEVEL_OUTOF10: 10
PAINLEVEL_OUTOF10: 10
PAINLEVEL_OUTOF10: 8
PAINLEVEL_OUTOF10: 10
PAINLEVEL_OUTOF10: 8
PAINLEVEL_OUTOF10: 8

## 2020-07-13 ASSESSMENT — PAIN DESCRIPTION - ORIENTATION: ORIENTATION: MID

## 2020-07-13 ASSESSMENT — PAIN DESCRIPTION - DESCRIPTORS: DESCRIPTORS: ACHING

## 2020-07-13 ASSESSMENT — PAIN - FUNCTIONAL ASSESSMENT: PAIN_FUNCTIONAL_ASSESSMENT: 0-10

## 2020-07-13 ASSESSMENT — PAIN DESCRIPTION - FREQUENCY: FREQUENCY: CONTINUOUS

## 2020-07-13 ASSESSMENT — PAIN DESCRIPTION - PAIN TYPE: TYPE: SURGICAL PAIN

## 2020-07-13 NOTE — PROGRESS NOTES
Pt returned to [facility] room 9. Vitals and assessment as charted. 0.9 infusing, @400ml to count from PACU. Pt has ice cream and pop. Pt calling her sister to update her. Pt verbalized understanding of discharge criteria and call light use. Call light in reach.

## 2020-07-13 NOTE — ANESTHESIA POSTPROCEDURE EVALUATION
Department of Anesthesiology  Postprocedure Note    Patient: Lisa Yang  MRN: [id number]  YOB: 1955  Date of evaluation: 7/13/2020  Time:  2:28 PM     Procedure Summary     Date:  07/13/20 [facility]    Anesthesia Start:  1253 Anesthesia Stop:  [phone]    Procedure:  CYSTOSCOPY WITH BLADDER BIOPSIES (N/A ) Diagnosis:  (ABORMAL BLADDER MUCOSA)    Surgeon:  Julito West MD Responsible Provider:  Kajal Santamaria MD    Anesthesia Type:  general ASA Status:  3          Anesthesia Type: general    Merline Phase I: Merline Score: 9    Merline Phase II: Merline Score: 10    Last vitals: Reviewed and per EMR flowsheets.        Anesthesia Post Evaluation    Patient location during evaluation: PACU  Patient participation: complete - patient participated  Level of consciousness: awake and alert  Airway patency: patent  Nausea & Vomiting: no nausea  Complications: no  Cardiovascular status: blood pressure returned to baseline and hemodynamically stable  Respiratory status: acceptable and spontaneous ventilation  Hydration status: euvolemic

## 2020-07-13 NOTE — ANESTHESIA PRE PROCEDURE
Department of Anesthesiology  Preprocedure Note       Name:  Shahida Hudson   Age:  63 y.o.  :  1955                                          MRN:  [id number]         Date:  2020      Surgeon: Marivel Lincoln):  Mateo Anderson MD    Procedure: Procedure(s):  CYSTOSCOPY WITH BLADDER BIOPSIES    Medications prior to admission:   Prior to Admission medications    Medication Sig Start Date End Date Taking? Authorizing Provider   Roflumilast (DALIRESP) 500 MCG tablet Take 500 mcg by mouth daily   Yes Historical Provider, MD   Tiotropium Bromide Monohydrate (SPIRIVA HANDIHALER IN) Inhale 2 puffs into the lungs daily   Yes Historical Provider, MD   ammonium lactate (AMLACTIN) 12 % cream Apply topically as needed for Dry Skin Apply topically as needed. Yes Historical Provider, MD   Calcium Carbonate 500 MG CHEW Take by mouth 2 tabs as needed   Yes Historical Provider, MD   oxymetazoline (GNP NASAL SPRAY EXTRA MOIST) 0.05 % nasal spray 2 sprays by Nasal route 2 times daily   Yes Historical Provider, MD   lidocaine (XYLOCAINE) 5 % ointment Apply topically as needed for Pain Apply topically as needed.    Yes Historical Provider, MD   phenazopyridine (PYRIDIUM) 100 MG tablet Take 100 mg by mouth 3 times daily as needed for Pain 2 tabs   Yes Historical Provider, MD   potassium chloride (KLOR-CON M) 20 MEQ extended release tablet Take 40 mEq by mouth daily  20  Yes Historical Provider, MD   metFORMIN (GLUCOPHAGE) 1000 MG tablet Take 1,000 mg by mouth 2 times daily (with meals)   Yes Historical Provider, MD   topiramate (TOPAMAX) 50 MG tablet Take 1 tablet by mouth 2 times daily 20  Yes BA Pastrana - KESHAV   hydroCHLOROthiazide (HYDRODIURIL) 25 MG tablet TAKE 1 TABLET BY MOUTH TWICE DAILY  Patient taking differently: Take 25 mg by mouth daily  20  Yes Diamond Joseph MD   Pancrelipase, Lip-Prot-Amyl, (ZENPEP) 16424-477179 units CPEP 2 caps 1600 1 cap at HS 19  Yes Dennie Service N BA Patel CNP   niacin 250 MG extended release capsule Take 2 capsules by mouth nightly  Patient taking differently: Take 500 mg by mouth nightly Currently on 750mg tablet at bedtime 12/26/19 12/25/20 Yes BA Diaz CNP   albuterol (PROVENTIL) (2.5 MG/3ML) 0.083% nebulizer solution Take 3 mLs by nebulization every 6 hours as needed for Wheezing 12/26/19  Yes BA Diaz CNP   levothyroxine (SYNTHROID) 75 MCG tablet Take 1 tablet by mouth daily everyday except Sunday take 150 mcg. 12/26/19  Yes BA Diaz CNP   fluticasone (FLONASE) 50 MCG/ACT nasal spray 1 spray by Each Nostril route 2 times daily 12/26/19  Yes BA Diaz CNP   ferrous sulfate 325 (65 Fe) MG tablet Take 1 tablet by mouth 2 times daily 12/26/19  Yes BA Diaz CNP   albuterol sulfate HFA (VENTOLIN HFA) 108 (90 Base) MCG/ACT inhaler Inhale 2 puffs into the lungs every 6 hours as needed for Wheezing 12/26/19  Yes BA Diaz CNP   folic acid (FOLVITE) 1 MG tablet Take 1 mg by mouth daily   Yes Historical Provider, MD   ARIPiprazole (ABILIFY) 2 MG tablet Take 2 mg by mouth daily   Yes Historical Provider, MD   atorvastatin (LIPITOR) 40 MG tablet Take 40 mg by mouth nightly    Yes Historical Provider, MD   escitalopram (LEXAPRO) 20 MG tablet Take 20 mg by mouth daily   Yes Historical Provider, MD   escitalopram (LEXAPRO) 10 MG tablet Take 10 mg by mouth daily   Yes Historical Provider, MD   fenofibrate 160 MG tablet Take 145 mg by mouth daily    Yes Historical Provider, MD   aspirin 81 MG chewable tablet Take 1 tablet by mouth daily 5/7/19  Yes Josephus Habermann, MD   nystatin (MYCOSTATIN) POWD powder Apply 1 each topically 2 times daily   Yes Historical Provider, MD   clopidogrel (PLAVIX) 75 MG tablet TAKE 1 TABLET BY MOUTH DAILY 3/8/19  Yes Roena Solomon, PA-C   hydrOXYzine (VISTARIL) 50 MG capsule Take 50 mg by mouth 3 times with cholelithiasis K80.10    GI bleed K92.2    Erosive esophagitis K22.10    Hypokalemia E87.6    HTN (hypertension), benign I10    Bipolar 1 disorder ([facility]) F31.9    Chronic pain G89.29    Hiatal hernia K44.9    Chest pain R07.9    Vomiting R11.10    Erosive gastritis K29.60    H pylori ulcer K27.9, B96.81    Hematemesis K92.0    History of Helicobacter pylori infection Z86.19    Intractable abdominal pain R10.9    Intractable vomiting with nausea R11.2    Epigastric abdominal pain R10.13    Acute blood loss anemia D62    Chronic chest pain R07.9, G89.29    Hyponatremia T14.4    Metabolic acidosis S02.6    Essential hypertension I10    COPD with acute exacerbation ([facility]) J44.1    Hypothyroidism E03.9    History of DVT (deep vein thrombosis) Z86.718    Depression F32.9    Tobacco abuse Z72.0    Hematemesis with nausea K92.0    Hypoxia R09.02    Pleural effusion, bilateral J90    Suicidal ideation R45.851    Bipolar disorder, in partial remission, most recent episode depressed ([facility]) F31.75    Bipolar II disorder ([email or web address] O64.60    Diastolic dysfunction Q06.02    Angina, class II ([facility]) I20.9    Unstable angina ([facility]) I20.0    Dyslipidemia E78.5    Electrolyte abnormality E87.8    COPD exacerbation ([facility]) J44.1    Acute respiratory insufficiency R06.89    Chronic diastolic heart failure ([facility]) I50.32    Tobacco use disorder F17.200    Nasal septal deviation J34.2    Hypertrophy of left inferior nasal turbinate J34.3    Rhinogenic headache R51    Asymmetrical sensorineural hearing loss H90.5    Tinnitus, left ear H93.12    Psychosis ([facility]) F29    Substance-induced psychotic disorder ([facility]) F19.959    Bipolar 1 disorder, depressed ([facility]) F31.9    Polysubstance abuse [facility] F19.10    Major depressive disorder, recurrent ([email or web address] F33.9    Stroke-like symptom R29.90    Right arm weakness R29.898    Delirium R41.0    Paresthesias R20.2    Depression, major, recurrent ([facility]) F33.9    7/20/2019    History of colonoscopy 2002    History of kidney stones     Hx of blood clots 6/17/2014    PE and collar bone area after shoulder surgery    Hyperlipidemia     Hypertension     Liver disease     enlarged liver - damaged with alcohol in past but no cirrhosis per patient    Pneumonia 7/24/2014    Suicidal thoughts     2015 [facility]    Thyroid disease     Type 2 diabetes mellitus without complication, without long-term current use of insulin ([address] 7/20/2019    Vomiting     Wears dentures     Wears glasses        Past Surgical History:        Procedure Laterality Date    ABDOMEN SURGERY      x4 removed cysts and ovary removed    CARDIAC SURGERY      heart caths, no stents    CARPAL TUNNEL RELEASE Bilateral 2016    CHOLECYSTECTOMY, LAPAROSCOPIC  6/12/15    Dr. Derrek Osgood, ESOPHAGUS      ELBOW SURGERY Right 10/7/2004    Dr. Merissa Redding Bilateral 2016    decompression   [facility]    Dr[facility] Suazo [facility] [address] [facility]    ROTATOR CUFF REPAIR  2004, 2014    right shoulder    SHOULDER SURGERY  2014    right    SKIN BIOPSY  2006    under left eye       Social History:    Social History     Tobacco Use    Smoking status: Current Every Day Smoker     Packs/day: 1.00     Years: 30.00     Pack years: 30.00     Types: Cigarettes     Start date: 4/22/1977    Smokeless tobacco: Never Used   Substance Use Topics    Alcohol use: No     Comment: none for 2 years                                Ready to quit: Not Answered  Counseling given: Not Answered      Vital Signs (Current):   Vitals:    07/13/20 1013   BP: 108/66   Pulse: 68   Resp: 16   Temp: 97.7 °F (36.5 °C)   TempSrc: Temporal   SpO2: 95%   Weight: 150 lb 3.2 oz (68.1 kg)   Height: 5' 4\" (1.626 m)                                              BP Readings from Last 3 Encounters:   07/13/20 108/66   06/15/20 108/62   05/31/20 103/66       NPO Status: Time of last liquid consumption: 2230                        Time of last solid consumption: 2230                        Date of last liquid consumption: 07/12/20                        Date of last solid food consumption: 07/12/20    BMI:   Wt Readings from Last 3 Encounters:   07/13/20 150 lb 3.2 oz (68.1 kg)   06/30/20 151 lb (68.5 kg)   06/15/20 152 lb (68.9 kg)     Body mass index is 25.78 kg/m².     CBC:   Lab Results   Component Value Date    WBC 9.9 05/30/2020    RBC 4.61 05/30/2020    RBC 4.41 04/19/2012    HGB 12.2 05/30/2020    HCT 37.1 05/30/2020    MCV 80.5 05/30/2020    RDW 14.5 12/12/2019     05/30/2020       CMP:   Lab Results   Component Value Date     05/30/2020    K 3.3 05/30/2020    CL 95 05/30/2020    CO2 25 05/30/2020    BUN 15 05/30/2020    CREATININE 0.8 05/30/2020    GFRAA >60 02/25/2019    LABGLOM 72 05/30/2020    GLUCOSE 104 05/30/2020    GLUCOSE 155 12/12/2019    PROT 6.9 05/30/2020    CALCIUM 11.2 05/30/2020    BILITOT 0.6 05/30/2020    ALKPHOS 38 05/30/2020    AST 15 05/30/2020    ALT 22 05/30/2020       POC Tests:   Recent Labs     07/13/20  1048   POCGLU 123*       Coags:   Lab Results   Component Value Date    PROTIME 14.6 12/12/2019    INR 1.08 05/17/2020    APTT 39.0 12/21/2019       HCG (If Applicable): No results found for: PREGTESTUR, PREGSERUM, HCG, HCGQUANT     ABGs: No results found for: PHART, PO2ART, CEM9JQK, VRR8HKC, BEART, I7YCLDMQ     Type & Screen (If Applicable):  Lab Results   Component Value Date    LABRH POS 11/17/2017       Drug/Infectious Status (If Applicable):  Lab Results   Component Value Date    HEPCAB Negative 10/12/2015       COVID-19 Screening (If Applicable):   Lab Results   Component Value Date    COVID19 NOT DETECTED 07/07/2020         Anesthesia Evaluation   no history of anesthetic complications:   Airway: Mallampati: II  TM distance: >3 FB   Neck ROM: full  Mouth opening: > = 3 FB Dental:          Pulmonary:normal exam    (+) COPD:            Patient did not smoke on day of surgery. Cardiovascular:    (+) hypertension:, CAD:,                   Neuro/Psych:   (+) CVA:, headaches:, psychiatric history:            GI/Hepatic/Renal:   (+) GERD:,           Endo/Other:    (+) Diabetes, hypothyroidism::., .          Pt had no PAT visit       Abdominal:           Vascular:                                        Anesthesia Plan      general     ASA 3       Induction: intravenous. MIPS: Postoperative opioids intended and Prophylactic antiemetics administered. Anesthetic plan and risks discussed with patient. Plan discussed with CRNA.                   Mellisa Cross MD   7/13/2020

## 2020-07-13 NOTE — PROGRESS NOTES
Pt has met discharge criteria and states she is ready for discharge to home. IV removed, gauze and tape applied. Dressed in own clothes and personal belongings gathered. Discharge instructions given to pt; pt verbalized understanding of discharge instructions, prescriptions x1 and follow up appointments. Pt transported to discharge lobby by [facility] staff.

## 2020-07-13 NOTE — H&P
Yvrose Polk MD  History and Physical    Patient:  Lakisha Farley  MRN: [id number]  YOB: 1955    HISTORY OF PRESENT ILLNESS:     The patient is a 63 y.o. female who presents with abnormal bladder mucosa and gross hematuria. Here for procedure. Patient's old records, notes and chart reviewed and summarized above. Yvrose Polk MD independently reviewed the images and verified the radiology reports from:    No results found.       Past Medical History:    Past Medical History:   Diagnosis Date    Acute renal failure with acute cortical necrosis (HCC) 12/21/2019    Allergic rhinitis     Arthritis     spine    Bipolar 1 disorder (HCC)     Bipolar disorder ([address]     Blood circulation, collateral     Cancer ([address] 2011    cancerous polyps removed - Dr. Barrios Larger Colon polyps     COPD (chronic obstructive pulmonary disease) ([address] 7/24/2014    Coronary vasospasm ([address] 8/17/2019    Depression     Diverticulitis of colon     GERD (gastroesophageal reflux disease)     Hiatal hernia     History of arterial ischemic stroke 7/20/2019    History of colonoscopy 2002    History of kidney stones     Hx of blood clots 6/17/2014    PE and collar bone area after shoulder surgery    Hyperlipidemia     Hypertension     Liver disease     enlarged liver - damaged with alcohol in past but no cirrhosis per patient    Pneumonia 7/24/2014    Suicidal thoughts     [facility]    Thyroid disease     Type 2 diabetes mellitus without complication, without long-term current use of insulin ([address] 7/20/2019    Vomiting     Wears dentures     Wears glasses        Past Surgical History:    Past Surgical History:   Procedure Laterality Date    ABDOMEN SURGERY      x4 removed cysts and ovary removed    CARDIAC SURGERY      heart caths, no stents    CARPAL TUNNEL RELEASE Bilateral 2016    CHOLECYSTECTOMY, LAPAROSCOPIC  6/12/15    Dr. Tracy Gallegos COLONOSCOPY  2011    DILATATION, ESOPHAGUS      ELBOW SURGERY Right 10/7/2004    Dr. Austen Rojas Bilateral 2016    decompression   [address]    [address] Jermaine Monroe with [address] [facility] [address]    ROTATOR CUFF REPAIR  2004, 2014    right shoulder    SHOULDER SURGERY  2014    right    SKIN BIOPSY  2006    under left eye       Medications Prior to Admission:    Prior to Admission medications    Medication Sig Start Date End Date Taking? Authorizing Provider   Roflumilast (DALIRESP) 500 MCG tablet Take 500 mcg by mouth daily   Yes Historical Provider, MD   Tiotropium Bromide Monohydrate (SPIRIVA HANDIHALER IN) Inhale 2 puffs into the lungs daily   Yes Historical Provider, MD   ammonium lactate (AMLACTIN) 12 % cream Apply topically as needed for Dry Skin Apply topically as needed. Yes Historical Provider, MD   Calcium Carbonate 500 MG CHEW Take by mouth 2 tabs as needed   Yes Historical Provider, MD   oxymetazoline (GNP NASAL SPRAY EXTRA MOIST) 0.05 % nasal spray 2 sprays by Nasal route 2 times daily   Yes Historical Provider, MD   lidocaine (XYLOCAINE) 5 % ointment Apply topically as needed for Pain Apply topically as needed.    Yes Historical Provider, MD   acetaminophen (MAPAP) 325 MG tablet Take 650 mg by mouth every 6 hours as needed for Pain   Yes Historical Provider, MD   phenazopyridine (PYRIDIUM) 100 MG tablet Take 100 mg by mouth 3 times daily as needed for Pain 2 tabs   Yes Historical Provider, MD   potassium chloride (KLOR-CON M) 20 MEQ extended release tablet Take 40 mEq by mouth daily  5/20/20  Yes Historical Provider, MD   metFORMIN (GLUCOPHAGE) 1000 MG tablet Take 1,000 mg by mouth 2 times daily (with meals)   Yes Historical Provider, MD   topiramate (TOPAMAX) 50 MG tablet Take 1 tablet by mouth 2 times daily 4/21/20  Yes BA Hansen CNP   hydroCHLOROthiazide (HYDRODIURIL) 25 MG tablet TAKE 1 TABLET BY MOUTH TWICE DAILY  Patient taking differently: Take 25 mg by mouth daily 4/20/20  Yes Claudette Specter, MD   Pancrelipase, Lip-Prot-Amyl, (ZENPEP) 57866-653558 units CPEP 2 caps 8/12/1600 1 cap at HS 12/26/19  Yes BA Diaz CNP   niacin 250 MG extended release capsule Take 2 capsules by mouth nightly  Patient taking differently: Take 500 mg by mouth nightly Currently on 750mg tablet at bedtime 12/26/19 12/25/20 Yes BA Diaz CNP   albuterol (PROVENTIL) (2.5 MG/3ML) 0.083% nebulizer solution Take 3 mLs by nebulization every 6 hours as needed for Wheezing 12/26/19  Yes BA Diaz CNP   levothyroxine (SYNTHROID) 75 MCG tablet Take 1 tablet by mouth daily everyday except Sunday take 150 mcg. 12/26/19  Yes BA Diaz CNP   fluticasone (FLONASE) 50 MCG/ACT nasal spray 1 spray by Each Nostril route 2 times daily 12/26/19  Yes BA Diaz CNP   ferrous sulfate 325 (65 Fe) MG tablet Take 1 tablet by mouth 2 times daily 12/26/19  Yes BA Diaz CNP   albuterol sulfate HFA (VENTOLIN HFA) 108 (90 Base) MCG/ACT inhaler Inhale 2 puffs into the lungs every 6 hours as needed for Wheezing 12/26/19  Yes BA Diaz CNP   folic acid (FOLVITE) 1 MG tablet Take 1 mg by mouth daily   Yes Historical Provider, MD   ARIPiprazole (ABILIFY) 2 MG tablet Take 2 mg by mouth daily   Yes Historical Provider, MD   atorvastatin (LIPITOR) 40 MG tablet Take 40 mg by mouth nightly    Yes Historical Provider, MD   escitalopram (LEXAPRO) 20 MG tablet Take 20 mg by mouth daily   Yes Historical Provider, MD   escitalopram (LEXAPRO) 10 MG tablet Take 10 mg by mouth daily   Yes Historical Provider, MD   fenofibrate 160 MG tablet Take 145 mg by mouth daily    Yes Historical Provider, MD   nystatin (MYCOSTATIN) POWD powder Apply 1 each topically 2 times daily   Yes Historical Provider, MD   hydrOXYzine (VISTARIL) 50 MG capsule Take 50 mg by mouth 3 times daily as needed for Itching or Anxiety Sexual activity: Not Currently   Lifestyle    Physical activity     Days per week: Not on file     Minutes per session: Not on file    Stress: Not on file   Relationships    Social connections     Talks on phone: Not on file     Gets together: Not on file     Attends [employer] service: Not on file     Active member of club or organization: Not on file     Attends meetings of clubs or organizations: Not on file     Relationship status: Not on file    Intimate partner violence     Fear of current or ex partner: Not on file     Emotionally abused: Not on file     Physically abused: Not on file     Forced sexual activity: Not on file   Other Topics Concern    Not on file   Social History Narrative    Not on file       Family History:    Family History   Problem Relation Age of Onset    Cancer Mother     Heart Disease Mother     High Blood Pressure Mother     High Cholesterol Mother     Cancer Father         brain    Depression Father     Heart Disease Father     High Cholesterol Father     Mental Illness Father     Cancer Sister     Heart Attack Sister     Heart Disease Maternal Uncle     High Cholesterol Maternal Uncle     Diabetes Maternal Grandmother     Heart Disease Maternal Grandmother     High Cholesterol Maternal Grandmother     Early Death Maternal Grandfather     Heart Disease Maternal Grandfather     High Cholesterol Maternal Grandfather     Stroke Maternal Grandfather     Heart Disease Paternal Grandmother     High Cholesterol Paternal Grandmother     Heart Disease Paternal Grandfather     High Cholesterol Paternal Grandfather        REVIEW OF SYSTEMS:  Constitutional: negative  Eyes: negative  Respiratory: negative  Cardiovascular: negative  Gastrointestinal: negative  Genitourinary: no acute issues  Musculoskeletal: negative  Skin: negative   Neurological: negative  Hematological/Lymphatic: negative  Psychological: negative    Physical Exam:      No data found.  Constitutional: Patient in no acute distress; Neuro: alert and oriented to person place and time. Psych: Mood and affect normal.  Skin: Normal  Lungs: Respiratory effort normal, CTA  Cardiovascular:  Normal peripheral pulses; no murmur. Normal rhythm  Abdomen: Soft, non-tender, non-distended with no CVA, flank pain, hepatosplenomegaly or hernia. Kidneys normal.  Bladder non-tender and not distended. LABS:   No results for input(s): WBC, HGB, HCT, MCV, PLT in the last 72 hours. No results for input(s): NA, K, CL, CO2, PHOS, BUN, CREATININE in the last 72 hours. Invalid input(s): CA  No results found for: PSA      Urinalysis: No results for input(s): COLORU, PHUR, LABCAST, WBCUA, RBCUA, MUCUS, TRICHOMONAS, YEAST, BACTERIA, CLARITYU, SPECGRAV, LEUKOCYTESUR, UROBILINOGEN, [place] in the last 72 hours.     Invalid input(s): NITRATE, GLUCOSEUKETONESUAMORPHOUS     -----------------------------------------------------------------      Assessment and Plan     Impression:    Patient Active Problem List   Diagnosis    GERD (gastroesophageal reflux disease)    Colon polyps    Chest pain, atypical    Gastroenteritis    Pneumonia    Shoulder pain    History of pulmonary embolism    COPD (chronic obstructive pulmonary disease) ([facility]    Hypoglycemia    Anticoagulated on Coumadin    B[facility]    Cannabis abuse    Cocaine abuse [facility]    Alcohol dependence in remission ([facility]    Cholecystitis with cholelithiasis    GI bleed    Erosive esophagitis    Hypokalemia    HTN (hypertension), benign    Bipolar 1 disorder (HCC)    Chronic pain    Hiatal hernia    Chest pain    Vomiting    Erosive gastritis    H pylori ulcer    Hematemesis    History of Helicobacter pylori infection    Intractable abdominal pain    Intractable vomiting with nausea    Epigastric abdominal pain    Acute blood loss anemia    Chronic chest pain    Hyponatremia    Metabolic acidosis    Essential hypertension    COPD with acute exacerbation (HCC)    Hypothyroidism    History of DVT (deep vein thrombosis)    Depression    Tobacco abuse    Hematemesis with nausea    Hypoxia    Pleural effusion, bilateral    Suicidal ideation    Bipolar disorder, in partial remission, most recent episode depressed (HCC)    Bipolar II disorder ([address]    Diastolic dysfunction    Angina, class II (HCC)    Unstable angina (HCC)    Dyslipidemia    Electrolyte abnormality    COPD exacerbation (HCC)    Acute respiratory insufficiency    Chronic diastolic heart failure (HCC)    Tobacco use disorder    Nasal septal deviation    Hypertrophy of left inferior nasal turbinate    Rhinogenic headache    Asymmetrical sensorineural hearing loss    Tinnitus, left ear    Psychosis (HCC)    Substance-induced psychotic disorder (HCC)    Bipolar 1 disorder, depressed ([address]    Polysubstance abuse ([address]    [facility] depressive disorder, recurrent ([facility]    Stroke-like symptom    Right arm weakness    Delirium    Paresthesias    Depression, major, recurrent ([address]    Depression with suicidal ideation    Amnesia    Bipolar disorder, current episode mixed, moderate (HCC)    Uqxgsaicn-Ahbxezi-Lmytgl disease    Carpal tunnel syndrome of left wrist    Change of, skin texture    Chronic midline low back pain without sciatica    Coronary vasospasm ([facility])    Derangement of knee    Disorder associated with Helicobacter species    Disturbance of skin sensation    Encounter for surgical follow-up care    Endometriosis    Environmental and seasonal allergies    Glaucoma suspect    History of arterial ischemic stroke    Mixed hyperlipidemia    Large liver    Lesion of ulnar nerve    Nasal congestion    Nicotine dependence    Pancreatic insufficiency    Primary osteoarthritis involving multiple joints    Sciatica    Sprain of right foot    Preoperative state    Type 2 diabetes mellitus without complication, without long-term current use of insulin (HCC)    Urinary incontinence, overflow    Elevated lactic acid level    Acute renal failure with acute cortical necrosis (HCC)    Pyelonephritis of left kidney    Pyelonephritis    Septicemia ([facility]       Plan:     Consent obtained; cystoscopy, bladder biopsies in OR today.     Yari Dykes MD  6:14 AM 7/13/2020

## 2020-07-13 NOTE — PROGRESS NOTES
1331: Patient arrived to PACU. Report received from [facility], BR[facility] and Josiane Box RN. Patient placed on cardiac monitor. Pt states pain level is 8/10 upon arrival. Denies nausea. 1335: Pt pulse ox 90%. Placed on 2L per nc and encouraged to C&DB. 1336: Pt given dose of fentanyl for pain level86/10.   1343: Pt resting with eyes closed. 1352: Pt waking up. States pain is tolerable. 1401: Pt meets pacu discharge criteria. Patient transported to [facility] in stable condition on 2L per nc. Chart and report given to Gavin Solorio, [facility].

## 2020-07-14 ENCOUNTER — TELEPHONE (OUTPATIENT)
Dept: UROLOGY | Age: 65
End: 2020-07-14

## 2020-07-14 RX ORDER — TRAMADOL HYDROCHLORIDE 50 MG/1
50 TABLET ORAL EVERY 4 HOURS PRN
Qty: 30 TABLET | Refills: 0 | Status: SHIPPED | OUTPATIENT
Start: 2020-07-14 | End: 2020-07-19

## 2020-07-14 NOTE — TELEPHONE ENCOUNTER
Let her know the biopsy showed no cancer. Keep the appt. In 2 weeks to discuss her other urologic problems.

## 2020-07-14 NOTE — TELEPHONE ENCOUNTER
Anjel Robbins at [facility] states the patient c/o bladder pain that is not relieved with tylenol rated a 7 on scale of 0-10. She uses [employer]. Patient would like a short supply of pain medication. Please advise. Thank you.

## 2020-07-14 NOTE — TELEPHONE ENCOUNTER
Patient is taking tylenol not helping much. Wanted to know if we could call in a few days worth of pain medication. [address].  Please advise

## 2020-07-14 NOTE — OP NOTE
The abnormal bladder mucosa is identified, but it  seems to be more in the midline today on the posterior bladder wall. Careful inspection with the 70- and 30-degree lenses revealed no other  new lesions. Ureteral orifices were identified and appeared normal.   The trigone appears normal.  The 30-degree lens and cold cup biopsy  forceps were used to obtain two specimens from the raised erythematous  mucosa. These were submitted in a single formalin container,  appropriately labeled. The areas biopsied were then cauterized with a  Bugbee electrode. At the end of the procedure, there was excellent  hemostasis and no perforation of the bladder. The bladder was drained,  the scope and sheath were removed, and this completed the procedure. The patient tolerated the procedure well. No complications were  apparent. Estimated blood loss was minimal.  The patient returned to  PACU.         Hiren Gil M.D.    D: 07/13/2020 13:38:54       T: 07/13/2020 14:32:10     [id number]  Job#: [id number]     Doc#: [id number]    CC:

## 2020-07-29 ENCOUNTER — NURSE ONLY (OUTPATIENT)
Dept: LAB | Age: 65
End: 2020-07-29

## 2020-07-29 ENCOUNTER — OFFICE VISIT (OUTPATIENT)
Dept: UROLOGY | Age: 65
End: 2020-07-29
Payer: MEDICAID

## 2020-07-29 VITALS — BODY MASS INDEX: 25.4 KG/M2 | WEIGHT: 148.8 LBS | TEMPERATURE: 96.8 F | HEIGHT: 64 IN

## 2020-07-29 LAB
ANION GAP SERPL CALCULATED.3IONS-SCNC: 13 MEQ/L (ref 8–16)
BILIRUBIN URINE: NEGATIVE
BLOOD URINE, POC: ABNORMAL
BUN BLDV-MCNC: 10 MG/DL (ref 7–22)
CALCIUM SERPL-MCNC: 10.8 MG/DL (ref 8.5–10.5)
CHARACTER, URINE: CLEAR
CHLORIDE BLD-SCNC: 101 MEQ/L (ref 98–111)
CO2: 24 MEQ/L (ref 23–33)
COLOR, URINE: YELLOW
CREAT SERPL-MCNC: 0.8 MG/DL (ref 0.4–1.2)
GFR SERPL CREATININE-BSD FRML MDRD: 72 ML/MIN/1.73M2
GLUCOSE BLD-MCNC: 109 MG/DL (ref 70–108)
GLUCOSE URINE: NEGATIVE MG/DL
KETONES, URINE: NEGATIVE
LEUKOCYTE CLUMPS, URINE: ABNORMAL
NITRITE, URINE: NEGATIVE
PH, URINE: 8.5 (ref 5–9)
POTASSIUM SERPL-SCNC: 4.4 MEQ/L (ref 3.5–5.2)
PROTEIN, URINE: 30 MG/DL
PTH INTACT: 26.3 PG/ML (ref 15–65)
SODIUM BLD-SCNC: 138 MEQ/L (ref 135–145)
SPECIFIC GRAVITY, URINE: 1.02 (ref 1–1.03)
UROBILINOGEN, URINE: 1 EU/DL (ref 0–1)

## 2020-07-29 PROCEDURE — 99214 OFFICE O/P EST MOD 30 MIN: CPT | Performed by: UROLOGY

## 2020-07-29 RX ORDER — TRAMADOL HYDROCHLORIDE 50 MG/1
50 TABLET ORAL EVERY 6 HOURS PRN
Qty: 20 TABLET | Refills: 0 | Status: SHIPPED | OUTPATIENT
Start: 2020-07-29 | End: 2020-08-03

## 2020-07-29 NOTE — PATIENT INSTRUCTIONS
are trying to quit smoking. · Consider signing up for a smoking cessation program, such as the American Lung Association's Freedom from Smoking program.  · Get text messaging support. Go to the website at www.smokefree. gov to sign up for the [facility] program.  · Set a quit date. Pick your date carefully so that it is not right in the middle of a big deadline or stressful time. Once you quit, do not even take a puff. Get rid of all ashtrays and lighters after your last cigarette. Clean your house and your clothes so that they do not smell of smoke. · Learn how to be a nonsmoker. Think about ways you can avoid those things that make you reach for a cigarette. ? Avoid situations that put you at greatest risk for smoking. For some people, it is hard to have a drink with friends without smoking. For others, they might skip a coffee break with coworkers who smoke. ? Change your daily routine. Take a different route to work or eat a meal in a different place. · Cut down on stress. Calm yourself or release tension by doing an activity you enjoy, such as reading a book, taking a hot bath, or gardening. · Talk to your doctor or pharmacist about nicotine replacement therapy, which replaces the nicotine in your body. You still get nicotine but you do not use tobacco. Nicotine replacement products help you slowly reduce the amount of nicotine you need. These products come in several forms, many of them available over-the-counter:  ? Nicotine patches  ? Nicotine gum and lozenges  ? Nicotine inhaler  · Ask your doctor about bupropion (Wellbutrin) or varenicline (Chantix), which are prescription medicines. They do not contain nicotine. They help you by reducing withdrawal symptoms, such as stress and anxiety. · Some people find hypnosis, acupuncture, and massage helpful for ending the smoking habit. · Eat a healthy diet and get regular exercise.  Having healthy habits will help your body move past its craving for nicotine. · Be prepared to keep trying. Most people are not successful the first few times they try to quit. Do not get mad at yourself if you smoke again. Make a list of things you learned and think about when you want to try again, such as next week, next month, or next year. Where can you learn more? Go to https://Rocketickpepiceweb.[employer]. org and sign in to your [employer] account. Enter D517 in the [employer] box to learn more about \"Stopping Smoking: Care Instructions. \"     If you do not have an account, please click on the \"Sign Up Now\" link. Current as of: March 12, 2020               Content Version: 12.5  © 2006-2020 Healthwise, Incorporated. Care instructions adapted under license by [facility] ([facility]). If you have questions about a medical condition or this instruction, always ask your healthcare professional. [address] any warranty or liability for your use of this information. You may receive a survey regarding the care you received during your visit. Your input is valuable to us. We encourage you to complete and return your survey. We hope you will choose us in the future for your healthcare needs.

## 2020-07-29 NOTE — PROGRESS NOTES
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 She has 2 stones in her left kidney that are intermittently uncomfortable. She was found to have hypercalcemia on a BMP at the end of May, 2020. She has moderately severe lower urinary tract symptoms, especially urge incontinence. She was tried on PNE testing, but unfortunately the leads had to be removed 1 day later. I reviewed the pathology report with her. I discussed the concerns regarding hypercalcemia. She may have hyperparathyroidism. For this, I have ordered a repeat calcium and parathormone levels. If these are abnormal, she will have to be referred to an endocrine surgeon to consider removal of the abnormal parathyroid glands. For her lower urinary tract symptoms, she is willing to try PNE testing once again. For her left renal stones, I have sent in a prescription for Ultram 50 mg 4 times daily as needed. In excess of 25 minutes was spent with the patient discussing their medical history, treatment outcomes and possible treatment related side effects. Greater than 50 per cent of this time was spent in face to face discussion of current disease status and ongoing management. I asked her to stop at the  to get scheduled for her PNE testing.

## 2020-07-30 ENCOUNTER — HOSPITAL ENCOUNTER (OUTPATIENT)
Dept: GENERAL RADIOLOGY | Age: 65
Discharge: HOME | End: 2020-07-30
Payer: MEDICAID

## 2020-07-30 ENCOUNTER — TELEPHONE (OUTPATIENT)
Dept: UROLOGY | Age: 65
End: 2020-07-30

## 2020-07-30 PROCEDURE — 74250 X-RAY XM SM INT 1CNTRST STD: CPT

## 2020-07-30 PROCEDURE — 2500000003 HC RX 250 WO HCPCS: Performed by: INTERNAL MEDICINE

## 2020-07-30 RX ADMIN — BARIUM SULFATE 600 ML: 240 SUSPENSION ORAL at 10:25

## 2020-07-30 NOTE — TELEPHONE ENCOUNTER
Spoke with [address] to let them know the patient is scheduled for an Interstim appointment with Dr Jill Craig on 8/18/20 @ 2:30pm with an urinalysis done 1 week prior. Orders faxed to [phone].

## 2020-07-31 ENCOUNTER — TELEPHONE (OUTPATIENT)
Dept: UROLOGY | Age: 65
End: 2020-07-31

## 2020-08-01 LAB
ORGANISM: ABNORMAL
URINE CULTURE, ROUTINE: ABNORMAL

## 2020-08-02 ENCOUNTER — TELEPHONE (OUTPATIENT)
Dept: UROLOGY | Age: 65
End: 2020-08-02

## 2020-08-02 RX ORDER — AMOXICILLIN AND CLAVULANATE POTASSIUM 875; 125 MG/1; MG/1
1 TABLET, FILM COATED ORAL 2 TIMES DAILY
Qty: 14 TABLET | Refills: 0 | Status: SHIPPED | OUTPATIENT
Start: 2020-08-02 | End: 2020-08-09

## 2020-08-11 LAB
BILIRUBIN URINE: NORMAL
BLOOD, URINE: NEGATIVE
CLARITY: NORMAL
COLOR: YELLOW
GLUCOSE URINE: NEGATIVE
KETONES, URINE: NEGATIVE
LEUKOCYTE ESTERASE, URINE: NEGATIVE
NITRITE, URINE: NEGATIVE
PH UA: 6 (ref 4.5–8)
PROTEIN UA: NEGATIVE
SPECIFIC GRAVITY UA: 1.01 (ref 1–1.03)
UROBILINOGEN, URINE: NORMAL

## 2020-08-18 ENCOUNTER — TELEPHONE (OUTPATIENT)
Dept: UROLOGY | Age: 65
End: 2020-08-18

## 2020-08-18 RX ORDER — CEFDINIR 300 MG/1
300 CAPSULE ORAL 2 TIMES DAILY
Qty: 28 CAPSULE | Refills: 0 | Status: SHIPPED | OUTPATIENT
Start: 2020-08-18 | End: 2020-09-01

## 2020-08-18 RX ORDER — CEFDINIR 300 MG/1
300 CAPSULE ORAL 2 TIMES DAILY
Qty: 14 CAPSULE | Refills: 0 | Status: SHIPPED | OUTPATIENT
Start: 2020-08-18 | End: 2020-08-18

## 2020-08-18 NOTE — TELEPHONE ENCOUNTER
----- Message from BA Marie CNP sent at 8/18/2020  7:47 AM EDT -----  Needs omnicef if she is not on any antibiotics or unless dr Xander Howe is treating this?

## 2020-08-18 NOTE — TELEPHONE ENCOUNTER
The patient states she has trouble voiding. When she gets up she has incontinence. She c/o burning, urgency, and frequency. Spoke to Crystal at [address] [facility]. She finished Augmentin on th 08/10/2020. The patient has not c/o to the staff about trouble voiding or symptoms. I verified the pharmacy. Thank you.

## 2020-08-18 NOTE — TELEPHONE ENCOUNTER
Spoke to New zafar at [facility] today and Henok Argueta is going home on 8/19/2020. Rosa said that she is picking up her medication and taking it to her home tomorrow. She was notified of the script sent in. She is also calling the office back to give us Mercedes's home phone number.

## 2020-08-18 NOTE — TELEPHONE ENCOUNTER
paulo Mcdermott is scheduled for interstim trial on 9-1-2020. Can we send in an extended course of antibiotic so that she doesn't acquire another infection prior to her next interstim trial appt?

## 2020-08-19 ENCOUNTER — TELEPHONE (OUTPATIENT)
Dept: UROLOGY | Age: 65
End: 2020-08-19

## 2020-08-19 NOTE — TELEPHONE ENCOUNTER
Left message for the patient that her appointment is on 9/1/20 @ 3 pm and to make sure she continues on her antibiotics.

## 2020-08-22 ENCOUNTER — APPOINTMENT (OUTPATIENT)
Dept: GENERAL RADIOLOGY | Age: 65
End: 2020-08-22
Payer: MEDICAID

## 2020-08-22 ENCOUNTER — HOSPITAL ENCOUNTER (EMERGENCY)
Age: 65
Discharge: HOME | End: 2020-08-22
Attending: EMERGENCY MEDICINE
Payer: MEDICAID

## 2020-08-22 VITALS
RESPIRATION RATE: 15 BRPM | WEIGHT: 148 LBS | TEMPERATURE: 97.5 F | BODY MASS INDEX: 25.4 KG/M2 | OXYGEN SATURATION: 98 % | HEART RATE: 83 BPM | DIASTOLIC BLOOD PRESSURE: 75 MMHG | SYSTOLIC BLOOD PRESSURE: 131 MMHG

## 2020-08-22 PROCEDURE — 73630 X-RAY EXAM OF FOOT: CPT

## 2020-08-22 PROCEDURE — 99282 EMERGENCY DEPT VISIT SF MDM: CPT

## 2020-08-22 RX ORDER — IBUPROFEN 400 MG/1
400 TABLET ORAL EVERY 6 HOURS PRN
Qty: 120 TABLET | Refills: 0 | Status: SHIPPED | OUTPATIENT
Start: 2020-08-22 | End: 2020-11-10

## 2020-08-22 ASSESSMENT — ENCOUNTER SYMPTOMS
ABDOMINAL PAIN: 0
SHORTNESS OF BREATH: 0
SINUS PAIN: 0
EYE REDNESS: 0
VOMITING: 0
BACK PAIN: 0
NAUSEA: 0
DIARRHEA: 0
SORE THROAT: 0
RHINORRHEA: 0

## 2020-08-22 ASSESSMENT — PAIN DESCRIPTION - ORIENTATION: ORIENTATION: LEFT

## 2020-08-22 ASSESSMENT — PAIN DESCRIPTION - DESCRIPTORS: DESCRIPTORS: ACHING

## 2020-08-22 ASSESSMENT — PAIN DESCRIPTION - LOCATION: LOCATION: FOOT

## 2020-08-22 ASSESSMENT — PAIN SCALES - GENERAL: PAINLEVEL_OUTOF10: 5

## 2020-08-22 ASSESSMENT — PAIN DESCRIPTION - PAIN TYPE: TYPE: ACUTE PAIN

## 2020-08-22 NOTE — ED NOTES
Presents with complaints of dropping a dresser on her left foot.      Evans Schulz RN  08/22/20 [id number]

## 2020-08-22 NOTE — ED PROVIDER NOTES
[facility] ENCOUNTER          Pt Name: Yesi Arias  MRN: [id number]  [place] 1955  Date of evaluation: 8/22/2020  Treating Resident Physician: Ember Jolley MD  Supervising Physician: Dr. Jed Hubbard       Chief Complaint   Patient presents with    Foot Injury     History obtained from the patient. HISTORY OF PRESENT ILLNESS    HPI  Yesi Arias is a 63 y.o. female who presents to the emergency department for evaluation of left foot pain after accidentally dropping a dresser yesterday while moving. Patient states she was lifting a dresser on the back of a [place] with her sister when it slipped off on the left foot. She has been able to ambulate on the foot. States her pain is been consistent over the past day and a half. She denies having any other injuries or trauma. The patient has no other acute complaints at this time. REVIEW OF SYSTEMS   Review of Systems   Constitutional: Negative for chills and fever. HENT: Negative for rhinorrhea, sinus pain and sore throat. Eyes: Negative for redness. Respiratory: Negative for shortness of breath. Cardiovascular: Negative for chest pain. Gastrointestinal: Negative for abdominal pain, diarrhea, nausea and vomiting. Musculoskeletal: Negative for back pain. Left foot pain. Skin: Negative for rash. Neurological: Negative for light-headedness and headaches. Psychiatric/Behavioral: Negative for agitation.          PAST MEDICAL AND SURGICAL HISTORY     Past Medical History:   Diagnosis Date    Acute renal failure with acute cortical necrosis (HCC) 12/21/2019    Allergic rhinitis     Arthritis     spine    Bipolar 1 disorder ([address]     B[facility]     Blood circulation, collateral     Cancer ([facility] 2011    cancerous polyps removed - Dr. Kate Washburn Colon polyps     COPD (chronic obstructive pulmonary disease) ([facility] 7/24/2014    Coronary vasospasm [facility] 8/17/2019    Depression     Diverticulitis of colon     GERD (gastroesophageal reflux disease)     Hiatal hernia     History of arterial ischemic stroke 7/20/2019    History of colonoscopy 2002    History of kidney stones     Hx of blood clots 6/17/2014    PE and collar bone area after shoulder surgery    Hyperlipidemia     Hypertension     Liver disease     enlarged liver - damaged with alcohol in past but no cirrhosis per patient    Pneumonia 7/24/2014    Suicidal thoughts     2015 [facility] to  [facility]    Thyroid disease     Type 2 diabetes mellitus without complication, without long-term current use of insulin ([facility] 7/20/2019    Vomiting     Wears dentures     Wears glasses      Past Surgical History:   Procedure Laterality Date    ABDOMEN SURGERY      x4 removed cysts and ovary removed    CARDIAC SURGERY      heart caths, no stents    CARPAL TUNNEL RELEASE Bilateral 2016    CHOLECYSTECTOMY, LAPAROSCOPIC  6/12/15    Dr. Gene Jacobsen N/A 7/13/2020    CYSTOSCOPY WITH BLADDER BIOPSIES performed by Lee Ferguson MD at [address], [address] Right 10/7/2004    Dr. Kong Jackson Bilateral 2016    decompression   [facility]    Dr. Nava HUTCHINSON with [facility]  2004, 2014    right shoulder    SHOULDER SURGERY  2014    right    SKIN BIOPSY  2006    under left eye         MEDICATIONS   No current facility-administered medications for this encounter.      Current Outpatient Medications:     ibuprofen (IBU) 400 MG tablet, Take 1 tablet by mouth every 6 hours as needed for Pain, Disp: 120 tablet, Rfl: 0    cefdinir (OMNICEF) 300 MG capsule, Take 1 capsule by mouth 2 times daily for 14 days, Disp: 28 capsule, Rfl: 0    Roflumilast (DALIRESP) 500 MCG tablet, Take 500 mcg by mouth daily, Disp: , Rfl:    Tiotropium Bromide Monohydrate (SPIRIVA HANDIHALER IN), Inhale 2 puffs into the lungs daily, Disp: , Rfl:     ammonium lactate (AMLACTIN) 12 % cream, Apply topically as needed for Dry Skin Apply topically as needed. , Disp: , Rfl:     Calcium Carbonate 500 MG CHEW, Take by mouth 2 tabs as needed, Disp: , Rfl:     oxymetazoline (GNP NASAL SPRAY EXTRA MOIST) 0.05 % nasal spray, 2 sprays by Nasal route 2 times daily, Disp: , Rfl:     lidocaine (XYLOCAINE) 5 % ointment, Apply topically as needed for Pain Apply topically as needed. , Disp: , Rfl:     acetaminophen (MAPAP) 325 MG tablet, Take 650 mg by mouth every 6 hours as needed for Pain, Disp: , Rfl:     phenazopyridine (PYRIDIUM) 100 MG tablet, Take 100 mg by mouth 3 times daily as needed for Pain 2 tabs, Disp: , Rfl:     potassium chloride (KLOR-CON M) 20 MEQ extended release tablet, Take 40 mEq by mouth daily , Disp: , Rfl:     metFORMIN (GLUCOPHAGE) 1000 MG tablet, Take 1,000 mg by mouth 2 times daily (with meals), Disp: , Rfl:     topiramate (TOPAMAX) 50 MG tablet, Take 1 tablet by mouth 2 times daily, Disp: 60 tablet, Rfl: 3    hydroCHLOROthiazide (HYDRODIURIL) 25 MG tablet, TAKE 1 TABLET BY MOUTH TWICE DAILY (Patient taking differently: Take 25 mg by mouth daily ), Disp: 60 tablet, Rfl: 1    Pancrelipase, Lip-Prot-Amyl, (ZENPEP) 81353-334783 units CPEP, 2 caps 8/12/1600 1 cap at HS, Disp: 120 capsule, Rfl: 0    niacin 250 MG extended release capsule, Take 2 capsules by mouth nightly (Patient taking differently: Take 500 mg by mouth nightly Currently on 750mg tablet at bedtime), Disp: 30 capsule, Rfl: 0    albuterol (PROVENTIL) (2.5 MG/3ML) 0.083% nebulizer solution, Take 3 mLs by nebulization every 6 hours as needed for Wheezing, Disp: 120 each, Rfl: 0    levothyroxine (SYNTHROID) 75 MCG tablet, Take 1 tablet by mouth daily everyday except Sunday take 150 mcg., Disp: 30 tablet, Rfl: 0    fluticasone (FLONASE) 50 MCG/ACT nasal spray, 1 spray by Each Nostril route 2 times daily, Disp: 1 Bottle, Rfl: 0    ferrous sulfate 325 (65 Fe) MG tablet, Take 1 tablet by mouth 2 times daily, Disp: 30 tablet, Rfl: 0    albuterol sulfate HFA (VENTOLIN HFA) 108 (90 Base) MCG/ACT inhaler, Inhale 2 puffs into the lungs every 6 hours as needed for Wheezing, Disp: 1 Inhaler, Rfl: 0    folic acid (FOLVITE) 1 MG tablet, Take 1 mg by mouth daily, Disp: , Rfl:     ARIPiprazole (ABILIFY) 2 MG tablet, Take 2 mg by mouth daily, Disp: , Rfl:     atorvastatin (LIPITOR) 40 MG tablet, Take 40 mg by mouth nightly , Disp: , Rfl:     escitalopram (LEXAPRO) 20 MG tablet, Take 20 mg by mouth daily, Disp: , Rfl:     escitalopram (LEXAPRO) 10 MG tablet, Take 10 mg by mouth daily, Disp: , Rfl:     fenofibrate 160 MG tablet, Take 145 mg by mouth daily , Disp: , Rfl:     nystatin (MYCOSTATIN) POWD powder, Apply 1 each topically 2 times daily, Disp: , Rfl:     hydrOXYzine (VISTARIL) 50 MG capsule, Take 50 mg by mouth 3 times daily as needed for Itching or Anxiety , Disp: , Rfl:     traZODone (DESYREL) 100 MG tablet, Take 2 tablets by mouth nightly (Patient taking differently: Take 150 mg by mouth nightly 2 tabs prn), Disp: 30 tablet, Rfl: 0    QUEtiapine (SEROQUEL XR) 300 MG extended release tablet, Take 2 tablets by mouth nightly (Patient taking differently: Take 600 mg by mouth nightly ), Disp: 30 tablet, Rfl: 0    isosorbide dinitrate (ISORDIL) 10 MG tablet, TAKE 1 TABLET BY MOUTH 3 TIMES DAILY, Disp: 270 tablet, Rfl: 3    carvedilol (COREG) 3.125 MG tablet, TAKE 1 TABLET BY MOUTH 2 TIMES DAILY (WITH MEALS), Disp: 180 tablet, Rfl: 3    pantoprazole (PROTONIX) 40 MG tablet, Take 1 tablet by mouth daily Indications: Gastroesophageal Reflux Disease, Disp: 10 tablet, Rfl: 0      SOCIAL HISTORY     Social History     Social History Narrative    Not on file     Social History     Tobacco Use    Smoking status: Current Every Day Smoker     Packs/day: 1.00     Years: 30.00     Pack years: 30. 00     Types: Cigarettes     Start date: 4/22/1977    Smokeless tobacco: Never Used   Substance Use Topics    Alcohol use: No     Comment: none for 2 years    Drug use: Not Currently     Frequency: 1.0 times per week     Types: Cocaine     Comment: smokes cocaine daily - per patient as of 5/14/2019         ALLERGIES     Allergies   Allergen Reactions    Seasonal Other (See Comments)     sneezing         FAMILY HISTORY     Family History   Problem Relation Age of Onset    Cancer Mother     Heart Disease Mother     High Blood Pressure Mother     High Cholesterol Mother     Cancer Father         brain    Depression Father     Heart Disease Father     High Cholesterol Father     Mental Illness Father     Cancer Sister     Heart Attack Sister     Heart Disease Maternal Uncle     High Cholesterol Maternal Uncle     Diabetes Maternal Grandmother     Heart Disease Maternal Grandmother     High Cholesterol Maternal Grandmother     Early Death Maternal Grandfather     Heart Disease Maternal Grandfather     High Cholesterol Maternal Grandfather     Stroke Maternal Grandfather     Heart Disease Paternal Grandmother     High Cholesterol Paternal Grandmother     Heart Disease Paternal Grandfather     High Cholesterol Paternal Grandfather          PREVIOUS RECORDS   Previous records reviewed: Patient was seen here on 5/30/2020 for generalized abdominal pain. PHYSICAL EXAM     ED Triage Vitals [08/22/20 1629]   BP Temp Temp Source Pulse Resp SpO2 Height Weight   131/75 97.5 °F (36.4 °C) Oral 83 15 98 % -- 148 lb (67.1 kg)     Initial vital signs and nursing assessment reviewed and normal. Pulsoximetry is normal per my interpretation. Additional Vital Signs:  Vitals:    08/22/20 1629   BP: 131/75   Pulse: 83   Resp: 15   Temp: 97.5 °F (36.4 °C)   SpO2: 98%       Physical Exam  Constitutional:       Appearance: Normal appearance. HENT:      Head: Normocephalic and atraumatic.       Right Ear: External ear normal.      Left Ear: External ear normal.      Nose: Nose normal.      Mouth/Throat:      Mouth: Mucous membranes are moist.      Pharynx: Oropharynx is clear. Eyes:      Conjunctiva/sclera: Conjunctivae normal.   Neck:      Musculoskeletal: Normal range of motion and neck supple. Cardiovascular:      Rate and Rhythm: Normal rate and regular rhythm. Pulses: Normal pulses. Pulmonary:      Effort: Pulmonary effort is normal. No respiratory distress. Musculoskeletal: Normal range of motion. General: Signs of injury present. Comments: Full range of motion of left ankle. Neurovascular intact, cap refill normal.  DP pulse 2+. Some slight tenderness to dorsum of foot with palpation. No medial or lateral malleoli tenderness. Skin:     General: Skin is warm and dry. Capillary Refill: Capillary refill takes less than 2 seconds. Neurological:      General: No focal deficit present. Mental Status: She is alert and oriented to person, place, and time. Psychiatric:         Mood and Affect: Mood normal.         Behavior: Behavior normal.             MEDICAL DECISION MAKING   Initial Assessment: This is a 29-year-old female who accidentally dropped a dresser on her left foot yesterday while moving it out of the back of the [place]. She presents with left foot pain for the past day and a half but has been able to ambulate. Differential Diagnosis: Fracture, contusion, dislocation sprain    Plan: X-ray of left foot was obtained. X-ray was normal.  Will give patient prescription for Motrin for pain control and discharge home. ED RESULTS   Laboratory results:  Labs Reviewed - No data to display    Radiologic studies results:  XR FOOT LEFT (MIN 3 VIEWS)   Final Result      No fracture or dislocation.       Final report electronically signed by Dr. Hawa Perdomo on 8/22/2020 4:53 PM          ED Medications administered this visit: Medications - No data to display      ED COURSE      Strict return precautions and follow up instructions were discussed with the patient prior to discharge, with which the patient agrees. MEDICATION CHANGES     New Prescriptions    IBUPROFEN (IBU) 400 MG TABLET    Take 1 tablet by mouth every 6 hours as needed for Pain         FINAL DISPOSITION     Final diagnoses:   Contusion of left foot, initial encounter     Condition: condition: good  Dispo: Discharge to home      This transcription was electronically signed. Parts of this transcriptions may have been dictated by use of voice recognition software and electronically transcribed, and parts may have been transcribed with the assistance of an ED scribe. The transcription may contain errors not detected in proofreading. Please refer to my supervising physician's documentation if my documentation differs.     Electronically Signed: Sheldon Pollard, 08/22/20, 5:18 PM         Sheldon Pollard MD  Resident  08/22/20 [id number]

## 2020-08-22 NOTE — ED PROVIDER NOTES
[address] [facility]  EMERGENCY MEDICINE ATTENDING ATTESTATION      Evaluation of Yesi Arias. Case discussed and care plan developed with resident physician. I agree with the note and plan as documented by him, except if my documentation differs. Patient seen and examined by me. I reviewed the medical, surgical, family and social history, medications and allergies. I have reviewed the nursing documentation. I have reviewed the patient's vital signs and are normal per my interpretation. Pulsoxymetry is normal per my interpretation. Brief H&P   Patient c/o pain to the left foot after she accidentally bumped part of a dresser while trying to move it at home yesterday. Patient has been ambulatory on it but wanted to be checked today. Has not taken anything for pain. Physical exam is notable for well appearing, nonfocal exam except for minimal swelling on the dorsum of the left foot. Medical Decision Making   MDM: Foot injury, rule out fracture. Plan: Analgesia if needed, x-rays, observation. Likely discharge home. Please see the resident physician completed note for final disposition except as documented on this attestation. Diagnosis, treatment and disposition plans were discussed and agreed upon by patient. This transcription was electronically signed. It was dictated by use of voice recognition software and electronically transcribed. The transcription may contain errors not detected in proofreading.        Electronically signed by Eli Ferris MD on 8/22/20 at 5:29 PM EDT        Eli Ferris MD  08/22/20 [id number]

## 2020-08-26 ENCOUNTER — OFFICE VISIT (OUTPATIENT)
Dept: PULMONOLOGY | Age: 65
End: 2020-08-26
Payer: MEDICAID

## 2020-08-26 VITALS
HEIGHT: 64 IN | OXYGEN SATURATION: 98 % | TEMPERATURE: 97.3 F | HEART RATE: 77 BPM | DIASTOLIC BLOOD PRESSURE: 70 MMHG | SYSTOLIC BLOOD PRESSURE: 116 MMHG | WEIGHT: 146.6 LBS | BODY MASS INDEX: 25.03 KG/M2

## 2020-08-26 LAB
EXPIRATORY TIME: NORMAL
FEF 25-75% %PRED-PRE: NORMAL
FEF 25-75% PRED: NORMAL
FEF 25-75%-PRE: NORMAL
FEV1 %PRED-PRE: 1.49 %
FEV1 PRED: NORMAL
FEV1/FVC %PRED-PRE: NORMAL
FEV1/FVC PRED: NORMAL
FEV1/FVC: 73 %
FEV1: NORMAL
FVC %PRED-PRE: NORMAL
FVC PRED: NORMAL
FVC: NORMAL
PEF %PRED-PRE: NORMAL
PEF PRED: NORMAL
PEF-PRE: NORMAL

## 2020-08-26 PROCEDURE — 99213 OFFICE O/P EST LOW 20 MIN: CPT | Performed by: NURSE PRACTITIONER

## 2020-08-26 PROCEDURE — 94664 DEMO&/EVAL PT USE INHALER: CPT | Performed by: NURSE PRACTITIONER

## 2020-08-26 RX ORDER — NEBULIZER ACCESSORIES
1 KIT MISCELLANEOUS EVERY 6 HOURS PRN
Qty: 1 KIT | Refills: 1 | Status: SHIPPED | OUTPATIENT
Start: 2020-08-26 | End: 2021-12-02

## 2020-08-26 RX ORDER — ALBUTEROL SULFATE 2.5 MG/3ML
2.5 SOLUTION RESPIRATORY (INHALATION) EVERY 6 HOURS PRN
Qty: 120 EACH | Refills: 0 | Status: SHIPPED | OUTPATIENT
Start: 2020-08-26

## 2020-08-26 ASSESSMENT — PULMONARY FUNCTION TESTS
FEV1_PERCENT_PREDICTED_PRE: 1.49
FEV1/FVC: 73

## 2020-08-26 ASSESSMENT — ENCOUNTER SYMPTOMS
DIARRHEA: 0
VOMITING: 0
CHEST TIGHTNESS: 0
SHORTNESS OF BREATH: 1
EYES NEGATIVE: 1
NAUSEA: 0
COUGH: 1
ABDOMINAL PAIN: 0
WHEEZING: 0

## 2020-08-26 NOTE — PROGRESS NOTES
[facility]     Patient: Chuy Simental, 63 y.o.   : 1955    Pt of Dr. Julia Almendarez   Patient presents with    Follow-up     COPD 6 month pulmonary follow up with no testing        HPI  Lissa Ornelas is here for 6 month follow up for COPD  Moved over the weekend, new place does not have A/C , breathing has been worse. Has not used her Spiriva/ Advair in past 2 days due to meds being in all over the place. Current every day smoker not as much , down to 1 pack every 3 days. Trying to quit . Last catrina 3/2018  No recent oral atb or steroids. Has neb machine, but can not find her tubing/ mouthpiece and needs albuterol med refills   No ER visits, no new medical issues.    Past Medical hx   PMH:  Past Medical History:   Diagnosis Date    Acute renal failure with acute cortical necrosis (HCC) 2019    Allergic rhinitis     Arthritis     spine    Bipolar 1 disorder (HCC)     Bipolar disorder ([address]     Blood circulation, collateral     Cancer ([address]     cancerous polyps removed - Dr. Kinza Gordon Colon polyps     COPD (chronic obstructive pulmonary disease) ([address] 2014    Coronary vasospasm ([address] 2019    Depression     Diverticulitis of colon     GERD (gastroesophageal reflux disease)     Hiatal hernia     History of arterial ischemic stroke 2019    History of colonoscopy 2002    History of kidney stones     Hx of blood clots 2014    PE and collar bone area after shoulder surgery    Hyperlipidemia     Hypertension     Liver disease     enlarged liver - damaged with alcohol in past but no cirrhosis per patient    Pneumonia 2014    Suicidal thoughts      admitted to  from [facility]    Thyroid disease     Type 2 diabetes mellitus without complication, without long-term current use of insulin ([address] 2019    Vomiting     Wears dentures     Wears glasses      SURGICAL HISTORY:  Past Surgical History:   Procedure Laterality Date    ABDOMEN SURGERY      x4 removed cysts and ovary removed    CARDIAC SURGERY      heart caths, no stents    CARPAL TUNNEL RELEASE Bilateral 2016    CHOLECYSTECTOMY, LAPAROSCOPIC  6/12/15    Dr. Leah Arreguin N/A 7/13/2020    CYSTOSCOPY WITH BLADDER BIOPSIES performed by Toi Siddiqi MD at [facility], [address] Right 10/7/2004    Dr. Mayra Terry Bilateral 2016    decompression   Azael Tsang [facility] with [facility]    ROTATOR CUFF REPAIR  2004, 2014    right shoulder    SHOULDER SURGERY  2014    right    SKIN BIOPSY  2006    under left eye     SOCIAL HISTORY:  Social History     Tobacco Use    Smoking status: Current Every Day Smoker     Packs/day: 1.00     Years: 30.00     Pack years: 30.00     Types: Cigarettes     Start date: 4/22/1977    Smokeless tobacco: Never Used   Substance Use Topics    Alcohol use: No     Comment: none for 2 years    Drug use: Not Currently     Frequency: 1.0 times per week     Types: Cocaine     Comment: smokes cocaine daily - per patient as of 5/14/2019     ALLERGIES:  Allergies   Allergen Reactions    Seasonal Other (See Comments)     sneezing     FAMILY HISTORY:  Family History   Problem Relation Age of Onset    Cancer Mother     Heart Disease Mother     High Blood Pressure Mother     High Cholesterol Mother     Cancer Father         brain    Depression Father     Heart Disease Father     High Cholesterol Father     Mental Illness Father     Cancer Sister     Heart Attack Sister     Heart Disease Maternal Uncle     High Cholesterol Maternal Uncle     Diabetes Maternal Grandmother     Heart Disease Maternal Grandmother     High Cholesterol Maternal Grandmother     Early Death Maternal Grandfather     Heart Disease Maternal Grandfather     High (Patient taking differently: Take 25 mg by mouth daily ) 60 tablet 1    Pancrelipase, Lip-Prot-Amyl, (ZENPEP) 66583-655855 units CPEP 2 caps 8/12/1600 1 cap at  capsule 0    niacin 250 MG extended release capsule Take 2 capsules by mouth nightly (Patient taking differently: Take 500 mg by mouth nightly Currently on 750mg tablet at bedtime) 30 capsule 0    levothyroxine (SYNTHROID) 75 MCG tablet Take 1 tablet by mouth daily everyday except Sunday take 150 mcg.  30 tablet 0    fluticasone (FLONASE) 50 MCG/ACT nasal spray 1 spray by Each Nostril route 2 times daily 1 Bottle 0    ferrous sulfate 325 (65 Fe) MG tablet Take 1 tablet by mouth 2 times daily 30 tablet 0    albuterol sulfate HFA (VENTOLIN HFA) 108 (90 Base) MCG/ACT inhaler Inhale 2 puffs into the lungs every 6 hours as needed for Wheezing 1 Inhaler 0    folic acid (FOLVITE) 1 MG tablet Take 1 mg by mouth daily      ARIPiprazole (ABILIFY) 2 MG tablet Take 2 mg by mouth daily      atorvastatin (LIPITOR) 40 MG tablet Take 40 mg by mouth nightly       escitalopram (LEXAPRO) 20 MG tablet Take 20 mg by mouth daily      escitalopram (LEXAPRO) 10 MG tablet Take 10 mg by mouth daily      fenofibrate 160 MG tablet Take 145 mg by mouth daily       nystatin (MYCOSTATIN) POWD powder Apply 1 each topically 2 times daily      hydrOXYzine (VISTARIL) 50 MG capsule Take 50 mg by mouth 3 times daily as needed for Itching or Anxiety       traZODone (DESYREL) 100 MG tablet Take 2 tablets by mouth nightly (Patient taking differently: Take 150 mg by mouth nightly 2 tabs prn) 30 tablet 0    QUEtiapine (SEROQUEL XR) 300 MG extended release tablet Take 2 tablets by mouth nightly (Patient taking differently: Take 600 mg by mouth nightly ) 30 tablet 0    isosorbide dinitrate (ISORDIL) 10 MG tablet TAKE 1 TABLET BY MOUTH 3 TIMES DAILY 270 tablet 3    carvedilol (COREG) 3.125 MG tablet TAKE 1 TABLET BY MOUTH 2 TIMES DAILY (WITH MEALS) 180 tablet 3    pantoprazole (PROTONIX) 40 MG tablet Take 1 tablet by mouth daily Indications: Gastroesophageal Reflux Disease 10 tablet 0     No current facility-administered medications for this visit. Millicent AUGUSTINE   Review of Systems   Constitutional: Negative for activity change, appetite change, chills and fever. HENT: Negative. Eyes: Negative. Respiratory: Positive for cough and shortness of breath. Negative for chest tightness and wheezing. Cardiovascular: Negative for chest pain, palpitations and leg swelling. Gastrointestinal: Negative for abdominal pain, diarrhea, nausea and vomiting. Genitourinary: Negative. Musculoskeletal: Negative. Skin: Negative. Neurological: Negative. Hematological: Does not bruise/bleed easily. Psychiatric/Behavioral: Negative for suicidal ideas. The patient is nervous/anxious (about moving ). Physical exam   /70 (Site: Right Upper Arm, Position: Sitting, Cuff Size: Medium Adult)   Pulse 77   Temp 97.3 °F (36.3 °C)   Ht 5' 4\" (1.626 m)   Wt 146 lb 9.6 oz (66.5 kg)   SpO2 98% Comment: on room air at rest  BMI 25.16 kg/m²        Physical Exam  Vitals signs and nursing note reviewed. Constitutional:       General: She is not in acute distress. Appearance: Normal appearance. She is well-developed. HENT:      Head:      Comments: Raspy voice      Mouth/Throat:      Lips: Pink. Mouth: Mucous membranes are moist.      Pharynx: Oropharynx is clear. No oropharyngeal exudate or posterior oropharyngeal erythema. Eyes:      Conjunctiva/sclera: Conjunctivae normal.   Neck:      Vascular: No JVD. Cardiovascular:      Rate and Rhythm: Normal rate and regular rhythm. Heart sounds: No murmur. No friction rub. Pulmonary:      Effort: Pulmonary effort is normal. No accessory muscle usage or respiratory distress. Breath sounds: Normal breath sounds. No wheezing, rhonchi or rales. Chest:      Chest wall: No tenderness.    Musculoskeletal:      Right lower leg: No edema. Left lower leg: No edema. Skin:     General: Skin is warm and dry. Capillary Refill: Capillary refill takes less than 2 seconds. Nails: There is no clubbing. Neurological:      Mental Status: She is alert. Psychiatric:         Mood and Affect: Mood normal.         Behavior: Behavior normal.         Thought Content: Thought content normal.         Judgment: Judgment normal.          Test results   Lung Nodule Screening     [x] Qualifies    []Does not qualify   [] Declined    [x] Completed 5/1/2020     ct lung screen 5/1/2020- ordered by PCP      Impression    1. There are no suspicious masses or nodules within the lung fields. There is moderate atelectatic/fibrotic stranding in the lingula and left lung base and mild atelectatic/fibrotic stranding right lung base. No effusion is seen. 2. There are no pathologically enlarged lymph nodes. There are a few tiny postinflammatory lymph nodes in mediastinum and both axillary regions. Small pericardial effusion. 3. LUNGRADS ASSESSMENT VALUE: 2,                Last spirometry done in 2018- mild obstruction - attempted IQ spirometry in office today, results were not reliable. Could not get a match , pt coughed through entire procedure and request to stop the test.           SARS-CoV-2  NOT DETECTED  NOT DETECT  Final  07/07/2020  1:46 PM  [address] [facility]       Assessment      Diagnosis Orders   1. Stage 1 mild COPD by GOLD classification ([facility])  Spirometry Without Bronchodilator   2. Cigarette nicotine dependence without complication        Plan   -stressed the importance of getting back on her inhalers; resume Spiriva/ Advair/ Ventolin  -script printed to get new nebulizer supplies  -order escribed for albuterol nebs. -sHe was instructed to use Albuterol HFA  inhaler 2 puffs Q6h prn or Albuterol 2.5mg nebs Q6h prn (the nebulizer) at one time as rescue medication not both at the same time.  sHe verbalizes understanding.   -recommend flu vaccine this fall  -proceed with annual low dose Ct screenings ( due after 5/1/2021)   -encouragement to continue working on smoking cessation, was given sample of nicorette lozenges and instructed on proper use.  -she decline PFT at hospital, due to need for COVID testing, she has already been testing twice now     Will see Louisa Teresa in: 6 months    Electronically signed by BA Jimenez CNP on 8/26/2020 at 4:41 PM

## 2020-09-01 ENCOUNTER — PROCEDURE VISIT (OUTPATIENT)
Dept: UROLOGY | Age: 65
End: 2020-09-01
Payer: MEDICAID

## 2020-09-01 VITALS — TEMPERATURE: 96.5 F | BODY MASS INDEX: 25.06 KG/M2 | HEIGHT: 64 IN | WEIGHT: 146.8 LBS

## 2020-09-01 LAB
BILIRUBIN URINE: NEGATIVE
BLOOD URINE, POC: ABNORMAL
CHARACTER, URINE: CLEAR
COLOR, URINE: YELLOW
GLUCOSE URINE: NEGATIVE MG/DL
KETONES, URINE: NEGATIVE
LEUKOCYTE CLUMPS, URINE: ABNORMAL
NITRITE, URINE: NEGATIVE
PH, URINE: 6.5 (ref 5–9)
PROTEIN, URINE: NEGATIVE MG/DL
SPECIFIC GRAVITY, URINE: 1.01 (ref 1–1.03)
UROBILINOGEN, URINE: 0.2 EU/DL (ref 0–1)

## 2020-09-01 PROCEDURE — 81003 URINALYSIS AUTO W/O SCOPE: CPT | Performed by: UROLOGY

## 2020-09-01 PROCEDURE — 64561 IMPLANT NEUROELECTRODES: CPT | Performed by: UROLOGY

## 2020-09-01 NOTE — PATIENT INSTRUCTIONS
You may receive a survey regarding the care you received during your visit. Your input is valuable to us. We encourage you to complete and return your survey. We hope you will choose us in the future for your healthcare needs. Patient Education        Stopping Smoking: Care Instructions  Your Care Instructions     Cigarette smokers crave the nicotine in cigarettes. Giving it up is much harder than simply changing a habit. Your body has to stop craving the nicotine. It is hard to quit, but you can do it. There are many tools that people use to quit smoking. You may find that combining tools works best for you. There are several steps to quitting. First you get ready to quit. Then you get support to help you. After that, you learn new skills and behaviors to become a nonsmoker. For many people, a necessary step is getting and using medicine. Your doctor will help you set up the plan that best meets your needs. You may want to attend a smoking cessation program to help you quit smoking. When you choose a program, look for one that has proven success. Ask your doctor for ideas. You will greatly increase your chances of success if you take medicine as well as get counseling or join a cessation program.  Some of the changes you feel when you first quit tobacco are uncomfortable. Your body will miss the nicotine at first, and you may feel short-tempered and grumpy. You may have trouble sleeping or concentrating. Medicine can help you deal with these symptoms. You may struggle with changing your smoking habits and rituals. The last step is the tricky one: Be prepared for the smoking urge to continue for a time. This is a lot to deal with, but keep at it. You will feel better. Follow-up care is a key part of your treatment and safety. Be sure to make and go to all appointments, and call your doctor if you are having problems.  It's also a good idea to know your test results and keep a list of the medicines you take.  How can you care for yourself at home? · Ask your family, friends, and coworkers for support. You have a better chance of quitting if you have help and support. · Join a support group, such as Nicotine Anonymous, for people who are trying to quit smoking. · Consider signing up for a smoking cessation program, such as the American Lung Association's Freedom from Smoking program.  · Get text messaging support. Go to the website at www.smokefree. gov to sign up for the [facility] program.  · Set a quit date. Pick your date carefully so that it is not right in the middle of a big deadline or stressful time. Once you quit, do not even take a puff. Get rid of all ashtrays and lighters after your last cigarette. Clean your house and your clothes so that they do not smell of smoke. · Learn how to be a nonsmoker. Think about ways you can avoid those things that make you reach for a cigarette. ? Avoid situations that put you at greatest risk for smoking. For some people, it is hard to have a drink with friends without smoking. For others, they might skip a coffee break with coworkers who smoke. ? Change your daily routine. Take a different route to work or eat a meal in a different place. · Cut down on stress. Calm yourself or release tension by doing an activity you enjoy, such as reading a book, taking a hot bath, or gardening. · Talk to your doctor or pharmacist about nicotine replacement therapy, which replaces the nicotine in your body. You still get nicotine but you do not use tobacco. Nicotine replacement products help you slowly reduce the amount of nicotine you need. These products come in several forms, many of them available over-the-counter:  ? Nicotine patches  ? Nicotine gum and lozenges  ? Nicotine inhaler  · Ask your doctor about bupropion (Wellbutrin) or varenicline (Chantix), which are prescription medicines. They do not contain nicotine.  They help you by reducing withdrawal symptoms, such as stress and anxiety. · Some people find hypnosis, acupuncture, and massage helpful for ending the smoking habit. · Eat a healthy diet and get regular exercise. Having healthy habits will help your body move past its craving for nicotine. · Be prepared to keep trying. Most people are not successful the first few times they try to quit. Do not get mad at yourself if you smoke again. Make a list of things you learned and think about when you want to try again, such as next week, next month, or next year. Where can you learn more? Go to https://PrintToPeertroyeb.ChannelEyes. org and sign in to your [employer] account. Enter O704 in the PopUp box to learn more about \"Stopping Smoking: Care Instructions. \"     If you do not have an account, please click on the \"Sign Up Now\" link. Current as of: March 12, 2020               Content Version: 12.5  © 9215-0298 Healthwise, Incorporated. Care instructions adapted under license by [facility] [address] If you have questions about a medical condition or this instruction, always ask your healthcare professional. [address] any warranty or liability for your use of this information.

## 2020-09-01 NOTE — PROGRESS NOTES
Procedure note   After explaining what we would be doing, Stacy Villagran was placed in prone position on the procedure table. Her lower back and upper gluteal region was prepped and she was draped in the standard fashion for surgery. Using sterile technique the position of needle placement was marked out and then the area where the needles would be advanced was instilled with local anesthetic. The location of needle placement was determined by using the standard length needle as a guide and then going 2 cm lateral to the midline on either side. Once we instilled local anesthetic I began advancing the standard needles on a 60° angle toward the S3 foramen. As the needle was initially advanced I bumped into bone and the needle was carefully pulled back and reinserted several times making very small adjustments in order to probe to find the S3 foramen. Once I was able to get into the S3 foramen on one side I then used this angulation as a guide in order to advance the needle on the opposite side. Once we were into the S3 foramen testing was performed and a good S3 response found. This was repeated on both sides with both needles and once we were comfortable that both needles were in the S3 foramen in good position we turned our attention to advancement of the leads. The leads were removed from their sheaths and one at a time were passed through the needle after removing the needle obturator. After advancing to the jared on the lead the lead was held in place as the needle was carefully removed leaving the lead in position through the S3 foramen. The lead obturator was then removed and the needle completely removed. This was repeated on both sides leaving the leads in good position. Sterile dressings were then applied as per 's direction and the temporary device attached and instructions given.  The temporary device will be tested over the next few days working with the representative from the company to make sure that we have adequate response before decision is made on implantation of the permanent device.

## 2020-09-01 NOTE — PROGRESS NOTES
Unable to review medications, patients states they have not changed. Patient does not know her medications. She was reminded to bring medication list with her.

## 2020-09-04 ENCOUNTER — NURSE ONLY (OUTPATIENT)
Dept: UROLOGY | Age: 65
End: 2020-09-04

## 2020-09-04 ENCOUNTER — TELEPHONE (OUTPATIENT)
Dept: UROLOGY | Age: 65
End: 2020-09-04

## 2020-09-04 VITALS — TEMPERATURE: 97 F

## 2020-09-04 NOTE — TELEPHONE ENCOUNTER
Patient was seen today for Interstim trial lead removal. She states she felt like her OAB was worse with the leads in. She is wanting to know what is next for her. When would you like to see her back in the office? Please advise, thank you.

## 2020-09-04 NOTE — TELEPHONE ENCOUNTER
Send a note or call Wilfred. See what he says. My advise is to try a formal Stage 1 in OR and see if that improves her symptoms.

## 2020-09-09 ENCOUNTER — TELEPHONE (OUTPATIENT)
Dept: UROLOGY | Age: 65
End: 2020-09-09

## 2020-09-09 NOTE — TELEPHONE ENCOUNTER
Patient scheduled for surgery with Dr Joy Joyce on 9/21/20. Surgery consent on arrival. Surgery instructions done verbally. Patient will do pre op CBC,urinalysis and Covid-19 on 9/14/20. Dr Bradford Masker to clear.

## 2020-09-09 NOTE — TELEPHONE ENCOUNTER
Patient scheduled for placement of interstim under MAC with Dr Catina Fairbanks on 9/21/20. We are asking for clearance.

## 2020-09-09 NOTE — TELEPHONE ENCOUNTER
SURGERY [address]  [facility] [address] [facility] [address] [place], [address]      Phone *[phone] *[phone]   Surgical Scheduling Direct Line Phone *[phone] Fax *[phone]      Gabrielle Yasir 1955 female    [address]   Marital Status:          Home Phone: [phone]      Cell Phone:    Telephone Information:   Mobile [phone]          Surgeon: Dr. Jules Gooden  Surgery Date: 9/21/20   Time: 8:30 am    Procedure: Stage 1 Interstim placement    Diagnosis: Urge Inconinence    Important Medical History: In Epic    Special Inst/Equip: Regular Room    CPT Codes:    99174  Latex Allergy:  No     Cardiac Device:  No     Anesthesia:  MAC          Admission Type:  Same Day                             Admit Prior to Day of Surgery: No    Case Location:  Main OR           Preadmission Testing:Phone Call              PAT Date and Time:______________________________________________________    PAT Confirmation #: ______________________________________________________    Post Op Visit: ___________________________________________________________    Need Preop Cardiac Clearance: Yes    Does Patient have Cardiologist/physician?      Dr. Aye Mclaughlin    Surgery Confirmation #: __________________________________________________    [facility] Backer: ________________________   Date: __________________________     [employer] Name: Medicaid

## 2020-09-14 ENCOUNTER — HOSPITAL ENCOUNTER (OUTPATIENT)
Age: 65
Discharge: HOME | End: 2020-09-14
Payer: MEDICAID

## 2020-09-14 LAB
BACTERIA: ABNORMAL /HPF
BASOPHILS # BLD: 0.6 %
BASOPHILS ABSOLUTE: 0.1 THOU/MM3 (ref 0–0.1)
BILIRUBIN URINE: NEGATIVE
BLOOD, URINE: ABNORMAL
CASTS 2: ABNORMAL /LPF
CASTS UA: ABNORMAL /LPF
CHARACTER, URINE: ABNORMAL
COLOR: YELLOW
CRYSTALS, UA: ABNORMAL
EOSINOPHIL # BLD: 1.2 %
EOSINOPHILS ABSOLUTE: 0.1 THOU/MM3 (ref 0–0.4)
EPITHELIAL CELLS, UA: ABNORMAL /HPF
ERYTHROCYTE [DISTWIDTH] IN BLOOD BY AUTOMATED COUNT: 13.2 % (ref 11.5–14.5)
ERYTHROCYTE [DISTWIDTH] IN BLOOD BY AUTOMATED COUNT: 42.5 FL (ref 35–45)
GLUCOSE URINE: NEGATIVE MG/DL
HCT VFR BLD CALC: 42 % (ref 37–47)
HEMOGLOBIN: 13.3 GM/DL (ref 12–16)
IMMATURE GRANS (ABS): 0.02 THOU/MM3 (ref 0–0.07)
IMMATURE GRANULOCYTES: 0.2 %
KETONES, URINE: NEGATIVE
LEUKOCYTE ESTERASE, URINE: ABNORMAL
LYMPHOCYTES # BLD: 37.9 %
LYMPHOCYTES ABSOLUTE: 3.2 THOU/MM3 (ref 1–4.8)
MCH RBC QN AUTO: 28.4 PG (ref 26–33)
MCHC RBC AUTO-ENTMCNC: 31.7 GM/DL (ref 32.2–35.5)
MCV RBC AUTO: 89.6 FL (ref 81–99)
MISCELLANEOUS 2: ABNORMAL
MONOCYTES # BLD: 5.6 %
MONOCYTES ABSOLUTE: 0.5 THOU/MM3 (ref 0.4–1.3)
NITRITE, URINE: NEGATIVE
NUCLEATED RED BLOOD CELLS: 0 /100 WBC
PH UA: 7 (ref 5–9)
PLATELET # BLD: 303 THOU/MM3 (ref 130–400)
PMV BLD AUTO: 10.1 FL (ref 9.4–12.4)
PROTEIN UA: NEGATIVE
RBC # BLD: 4.69 MILL/MM3 (ref 4.2–5.4)
RBC URINE: ABNORMAL /HPF
RENAL EPITHELIAL, UA: ABNORMAL
SEG NEUTROPHILS: 54.5 %
SEGMENTED NEUTROPHILS ABSOLUTE COUNT: 4.6 THOU/MM3 (ref 1.8–7.7)
SPECIFIC GRAVITY, URINE: 1.01 (ref 1–1.03)
UROBILINOGEN, URINE: 1 EU/DL (ref 0–1)
WBC # BLD: 8.5 THOU/MM3 (ref 4.8–10.8)
WBC UA: > 100 /HPF
YEAST: ABNORMAL

## 2020-09-14 PROCEDURE — 87086 URINE CULTURE/COLONY COUNT: CPT

## 2020-09-14 PROCEDURE — 85025 COMPLETE CBC W/AUTO DIFF WBC: CPT

## 2020-09-14 PROCEDURE — 87077 CULTURE AEROBIC IDENTIFY: CPT

## 2020-09-14 PROCEDURE — 81001 URINALYSIS AUTO W/SCOPE: CPT

## 2020-09-14 PROCEDURE — U0003 INFECTIOUS AGENT DETECTION BY NUCLEIC ACID (DNA OR RNA); SEVERE ACUTE RESPIRATORY SYNDROME CORONAVIRUS 2 (SARS-COV-2) (CORONAVIRUS DISEASE [COVID-19]), AMPLIFIED PROBE TECHNIQUE, MAKING USE OF HIGH THROUGHPUT TECHNOLOGIES AS DESCRIBED BY CMS-2020-01-R: HCPCS

## 2020-09-14 PROCEDURE — 36415 COLL VENOUS BLD VENIPUNCTURE: CPT

## 2020-09-14 PROCEDURE — 87186 SC STD MICRODIL/AGAR DIL: CPT

## 2020-09-15 ENCOUNTER — PREP FOR PROCEDURE (OUTPATIENT)
Dept: UROLOGY | Age: 65
End: 2020-09-15

## 2020-09-15 LAB
ORGANISM: ABNORMAL
SARS-COV-2: NOT DETECTED
URINE CULTURE REFLEX: ABNORMAL

## 2020-09-15 RX ORDER — SODIUM CHLORIDE 9 MG/ML
INJECTION, SOLUTION INTRAVENOUS CONTINUOUS
Status: CANCELLED | OUTPATIENT
Start: 2020-09-21

## 2020-09-16 RX ORDER — AMOXICILLIN AND CLAVULANATE POTASSIUM 875; 125 MG/1; MG/1
1 TABLET, FILM COATED ORAL 2 TIMES DAILY
Qty: 14 TABLET | Refills: 0 | Status: SHIPPED | OUTPATIENT
Start: 2020-09-16 | End: 2020-09-23

## 2020-09-25 ENCOUNTER — HOSPITAL ENCOUNTER (OUTPATIENT)
Dept: WOMENS IMAGING | Age: 65
Discharge: HOME | End: 2020-09-25
Payer: MEDICAID

## 2020-09-25 PROCEDURE — G0279 TOMOSYNTHESIS, MAMMO: HCPCS

## 2020-09-29 ENCOUNTER — TELEPHONE (OUTPATIENT)
Dept: UROLOGY | Age: 65
End: 2020-09-29

## 2020-09-29 NOTE — TELEPHONE ENCOUNTER
Tried to reach out to the patient to reschedule her surgery but her voice mail is full. Reached out to her emergency contact and she will try to get her to call the office. Patient scheduled for surgery on 10/9/20 with Dr Dayna Orellana. Surgery consent on arrival. Patient still has the surgery instructions from cancelled 9/21/20 surgery. Patient will do urinalysis and Covid 19 on 10/2/20.

## 2020-09-29 NOTE — TELEPHONE ENCOUNTER
SURGERY [address]  [facility] [address] [facility] [address] [place] [address]      Phone *[phone] *[phone]   Surgical Scheduling Direct Line Phone *[phone] Fax *[phone]      Yesi Arias 1955 female    [address] [facility] [address]   Marital Status:          Home Phone: [phone]      Cell Phone:    Telephone Information:   Mobile [phone]          Surgeon: Dr. Joy Joyce  Surgery Date: 10/9/20   Time: 8:30 am    Procedure: Stage 1 Interstim placement    Diagnosis: Urge Incontinence     Important Medical History: In Epic    Special Inst/Equip: Regular Room    CPT Codes:    81601  Latex Allergy:  No     Cardiac Device:  No     Anesthesia:  MAC          Admission Type:  Same Day                             Admit Prior to Day of Surgery: No    Case Location:  Main OR           Preadmission Testing:Phone Call              PAT Date and Time:______________________________________________________    PAT Confirmation #: ______________________________________________________    Post Op Visit: ___________________________________________________________    Need Preop Cardiac Clearance: Yes    Does Patient have Cardiologist/physician?      Dr Suzette Aragon cleared     Surgery Confirmation #: __________________________________________________    May Poplin: ________________________   Date: __________________________     [employer] Name: Medicaid

## 2020-10-02 ENCOUNTER — HOSPITAL ENCOUNTER (OUTPATIENT)
Age: 65
Discharge: HOME | End: 2020-10-02
Payer: MEDICAID

## 2020-10-02 PROCEDURE — U0003 INFECTIOUS AGENT DETECTION BY NUCLEIC ACID (DNA OR RNA); SEVERE ACUTE RESPIRATORY SYNDROME CORONAVIRUS 2 (SARS-COV-2) (CORONAVIRUS DISEASE [COVID-19]), AMPLIFIED PROBE TECHNIQUE, MAKING USE OF HIGH THROUGHPUT TECHNOLOGIES AS DESCRIBED BY CMS-2020-01-R: HCPCS

## 2020-10-03 LAB — SARS-COV-2: NOT DETECTED

## 2020-10-05 ENCOUNTER — PREP FOR PROCEDURE (OUTPATIENT)
Dept: UROLOGY | Age: 65
End: 2020-10-05

## 2020-10-05 RX ORDER — SODIUM CHLORIDE 9 MG/ML
INJECTION, SOLUTION INTRAVENOUS CONTINUOUS
Status: CANCELLED | OUTPATIENT
Start: 2020-10-09

## 2020-10-08 NOTE — H&P
Whit Sun MD  History and Physical    Patient:  Kayli Almeida  MRN: [id number]  YOB: 1955    HISTORY OF PRESENT ILLNESS:     The patient is a 63 y.o. female who presents with urge incontinence not controlled with conservative measures. Here for procedure. Patient's old records, notes and chart reviewed and summarized above. Whit Sun MD independently reviewed the images and verified the radiology reports from:    No results found.       Past Medical History:    Past Medical History:   Diagnosis Date    Acute renal failure with acute cortical necrosis (HCC) 12/21/2019    Allergic rhinitis     Arthritis     spine    Bipolar 1 disorder (HCC)     Bipolar disorder ([address]     Blood circulation, collateral     Cancer ([address] 2011    cancerous polyps removed - Dr. Barbara Bardales Colon polyps     COPD (chronic obstructive pulmonary disease) ([address] 7/24/2014    Coronary vasospasm ([address] 8/17/2019    Depression     Diverticulitis of colon     GERD (gastroesophageal reflux disease)     Hiatal hernia     History of arterial ischemic stroke 7/20/2019    History of colonoscopy 2002    History of kidney stones     Hx of blood clots 6/17/2014    PE and collar bone area after shoulder surgery    Hyperlipidemia     Hypertension     Liver disease     enlarged liver - damaged with alcohol in past but no cirrhosis per patient    Pneumonia 7/24/2014    Suicidal thoughts     2015 admitted to  from [facility]    Thyroid disease     Type 2 diabetes mellitus without complication, without long-term current use of insulin ([address] 7/20/2019    Vomiting     Wears dentures     Wears glasses        Past Surgical History:    Past Surgical History:   Procedure Laterality Date    ABDOMEN SURGERY      x4 removed cysts and ovary removed    CARDIAC SURGERY      heart caths, no stents    CARPAL TUNNEL RELEASE Bilateral 2016    CHOLECYSTECTOMY, LAPAROSCOPIC  6/12/15    Dr. Dylan Thomas COLONOSCOPY  2011    CYSTOSCOPY W BIOPSY OF BLADDER N/A 7/13/2020    CYSTOSCOPY WITH BLADDER BIOPSIES performed by Lolita Cota MD at [address], [address] Right 10/7/2004    Dr. Kathy Tran Bilateral 2016    decompression   Cathleen Conor    Dr. Ta Swain -[facility] with [address] [facility]    ROTATOR CUFF REPAIR  2004, 2014    right shoulder    SHOULDER SURGERY  2014    right    SKIN BIOPSY  2006    under left eye       Medications Prior to Admission:    Prior to Admission medications    Medication Sig Start Date End Date Taking?  Authorizing Provider   Respiratory Therapy Supplies (NEBULIZER/TUBING/MOUTHPIECE) KIT 1 kit by Does not apply route every 6 hours as needed (shortness of breath) 8/26/20 8/26/21  BA Worley CNP   albuterol (PROVENTIL) (2.5 MG/3ML) 0.083% nebulizer solution Take 3 mLs by nebulization every 6 hours as needed for Wheezing 8/26/20   BA Fierro CNP   ibuprofen (IBU) 400 MG tablet Take 1 tablet by mouth every 6 hours as needed for Pain 8/22/20   Vinicio Larios MD   Roflumilast (DALIRESP) 500 MCG tablet Take 500 mcg by mouth daily    Historical Provider, MD   Tiotropium Bromide Monohydrate (SPIRIVA HANDIHALER IN) Inhale 2 puffs into the lungs daily    Historical Provider, MD   Calcium Carbonate 500 MG CHEW Take by mouth 2 tabs as needed    Historical Provider, MD   oxymetazoline (GNP NASAL SPRAY EXTRA MOIST) 0.05 % nasal spray 2 sprays by Nasal route 2 times daily    Historical Provider, MD   acetaminophen (MAPAP) 325 MG tablet Take 650 mg by mouth every 6 hours as needed for Pain    Historical Provider, MD   potassium chloride (KLOR-CON M) 20 MEQ extended release tablet Take 40 mEq by mouth daily  5/20/20   Historical Provider, MD   metFORMIN (GLUCOPHAGE) 1000 MG tablet Take 1,000 mg by mouth 2 times daily (with meals)    Historical Provider, MD   topiramate (TOPAMAX) 50 MG tablet Take 1 tablet by mouth 2 times daily 4/21/20   BA Fuller CNP   hydroCHLOROthiazide (HYDRODIURIL) 25 MG tablet TAKE 1 TABLET BY MOUTH TWICE DAILY  Patient taking differently: Take 25 mg by mouth daily  4/20/20   Evi Li MD   Pancrelipase, Lip-Prot-Amyl, (ZENPEP) 89993-364256 units CPEP 2 caps 8/12/1600 1 cap at [facility] 12/26/19   BA Diaz CNP   niacin 250 MG extended release capsule Take 2 capsules by mouth nightly  Patient taking differently: Take 500 mg by mouth nightly Currently on 750mg tablet at bedtime 12/26/19 12/25/20  BA Calvillo CNP   levothyroxine (SYNTHROID) 75 MCG tablet Take 1 tablet by mouth daily everyday except Sunday take 150 mcg. 12/26/19   BA Calvillo CNP   fluticasone (FLONASE) 50 MCG/ACT nasal spray 1 spray by Each Nostril route 2 times daily 12/26/19   BA Calvillo CNP   ferrous sulfate 325 (65 Fe) MG tablet Take 1 tablet by mouth 2 times daily 12/26/19   BA Calvillo CNP   albuterol sulfate HFA (VENTOLIN HFA) 108 (90 Base) MCG/ACT inhaler Inhale 2 puffs into the lungs every 6 hours as needed for Wheezing 12/26/19   BA Diaz CNP   folic acid (FOLVITE) 1 MG tablet Take 1 mg by mouth daily    Historical Provider, MD   ARIPiprazole (ABILIFY) 2 MG tablet Take 2 mg by mouth daily    Historical Provider, MD   atorvastatin (LIPITOR) 40 MG tablet Take 40 mg by mouth nightly     Historical Provider, MD   escitalopram (LEXAPRO) 20 MG tablet Take 20 mg by mouth daily    Historical Provider, MD   escitalopram (LEXAPRO) 10 MG tablet Take 10 mg by mouth daily    Historical Provider, MD   fenofibrate 160 MG tablet Take 145 mg by mouth daily     Historical Provider, MD   nystatin (MYCOSTATIN) POWD powder Apply 1 each topically 2 times daily    Historical Provider, MD   hydrOXYzine (VISTARIL) 50 MG capsule Take 50 mg by mouth 3 times daily as needed for Itching or Anxiety     Historical Provider, MD   traZODone (DESYREL) 100 MG tablet Take 2 tablets by mouth nightly  Patient taking differently: Take 150 mg by mouth nightly 2 tabs prn 10/31/18   Meaghan Dennison MD   QUEtiapine (SEROQUEL XR) 300 MG extended release tablet Take 2 tablets by mouth nightly  Patient taking differently: Take 600 mg by mouth nightly  10/31/18   Meaghan Dennison MD   isosorbide dinitrate (ISORDIL) 10 MG tablet TAKE 1 TABLET BY MOUTH 3 TIMES DAILY 9/21/18   Seleta Goldberg Buettner, PA-C   carvedilol (COREG) 3.125 MG tablet TAKE 1 TABLET BY MOUTH 2 TIMES DAILY (WITH MEALS) 9/21/18   Seleta Goldberg Buettner, PA-C   pantoprazole (PROTONIX) 40 MG tablet Take 1 tablet by mouth daily Indications: Gastroesophageal Reflux Disease 4/15/16   Eva Collins MD       Allergies:  Seasonal    Social History:    Social History     Socioeconomic History    Marital status:      Spouse name: Not on file    Number of children: 3    Years of education: 15    Highest education level: Not on file   Occupational History    Occupation: on disability     Employer: [employer] and [address] resource strain: Not on file    Food insecurity     Worry: Not on file     Inability: Not on file   [employer] needs     Medical: Not on file     Non-medical: Not on file   Tobacco Use    Smoking status: Current Every Day Smoker     Packs/day: 1.00     Years: 30.00     Pack years: 30.00     Types: Cigarettes     Start date: 4/22/1977    Smokeless tobacco: Never Used   Substance and Sexual Activity    Alcohol use: No     Comment: none for 2 years    Drug use: Yes     Frequency: 1.0 times per week     Types: Cocaine     Comment: 5/8/20    Sexual activity: Not Currently   Lifestyle    Physical activity     Days per week: Not on file     Minutes per session: Not on file    Stress: Not on file   Relationships    Social connections     Talks on phone: Not on file     Gets together: Not on file     Attends Episcopal service: Not on file     Active member of club or organization: Not on file     Attends meetings of clubs or organizations: Not on file     Relationship status: Not on file    Intimate partner violence     Fear of current or ex partner: Not on file     Emotionally abused: Not on file     Physically abused: Not on file     Forced sexual activity: Not on file   Other Topics Concern    Not on file   Social History Narrative    Not on file       Family History:    Family History   Problem Relation Age of Onset    Cancer Mother     Heart Disease Mother     High Blood Pressure Mother     High Cholesterol Mother     Cancer Father         brain    Depression Father     Heart Disease Father     High Cholesterol Father     Mental Illness Father     Cancer Sister     Heart Attack Sister     Heart Disease Maternal Uncle     High Cholesterol Maternal Uncle     Diabetes Maternal Grandmother     Heart Disease Maternal Grandmother     High Cholesterol Maternal Grandmother     Early Death Maternal Grandfather     Heart Disease Maternal Grandfather     High Cholesterol Maternal Grandfather     Stroke Maternal Grandfather     Heart Disease Paternal Grandmother     High Cholesterol Paternal Grandmother     Heart Disease Paternal Grandfather     High Cholesterol Paternal Grandfather        REVIEW OF SYSTEMS:  Constitutional: negative  Eyes: negative  Respiratory: negative  Cardiovascular: negative  Gastrointestinal: negative  Genitourinary: no acute issues  Musculoskeletal: negative  Skin: negative   Neurological: negative  Hematological/Lymphatic: negative  Psychological: negative    Physical Exam:      No data found. Constitutional: Patient in no acute distress; Neuro: alert and oriented to person place and time. Psych: Mood and affect normal.  Skin: Normal  Lungs: Respiratory effort normal, CTA  Cardiovascular:  Normal peripheral pulses; no murmur.  Normal rhythm  Abdomen: Soft, non-tender, non-distended with no CVA, flank pain, hepatosplenomegaly or hernia. Kidneys normal.  Bladder non-tender and not distended. LABS:   No results for input(s): WBC, HGB, HCT, MCV, PLT in the last 72 hours. No results for input(s): NA, K, CL, CO2, PHOS, BUN, CREATININE in the last 72 hours. Invalid input(s): CA  No results found for: PSA      Urinalysis: No results for input(s): COLORU, PHUR, LABCAST, WBCUA, RBCUA, MUCUS, TRICHOMONAS, YEAST, BACTERIA, CLARITYU, SPECGRAV, LEUKOCYTESUR, UROBILINOGEN, [place] Mean in the last 72 hours.     Invalid input(s): NITRATE, GLUCOSEUKETONESUAMORPHOUS     -----------------------------------------------------------------      Assessment and Plan     Impression:    Patient Active Problem List   Diagnosis    GERD (gastroesophageal reflux disease)    Colon polyps    Chest pain, atypical    Gastroenteritis    Pneumonia    Shoulder pain    History of pulmonary embolism    [facility]) [facility]    Hypoglycemia    Anticoagulated on Coumadin    Bipolar disorder ([facility]    Cannabis abuse    Cocaine abuse ([facility]    Alcohol dependence in remission ([facility]    Cholecystitis with cholelithiasis    GI bleed    Erosive esophagitis    Hypokalemia    HTN (hypertension), benign    Bipolar 1 disorder (HCC)    Chronic pain    Hiatal hernia    Chest pain    Vomiting    Erosive gastritis    H pylori ulcer    Hematemesis    History of Helicobacter pylori infection    Intractable abdominal pain    Intractable vomiting with nausea    Epigastric abdominal pain    Acute blood loss anemia    Chronic chest pain    Hyponatremia    Metabolic acidosis    Essential hypertension    COPD with acute exacerbation (HCC)    Hypothyroidism    History of DVT (deep vein thrombosis)    Depression    Tobacco abuse    Hematemesis with nausea    Hypoxia    Pleural effusion, bilateral    Suicidal ideation    Bipolar disorder, in partial remission, most recent episode depressed ([address]    Bipolar II disorder ([address]    Diastolic dysfunction    Angina, class II ([address]    Unstable angina ([address]    Dyslipidemia    Electrolyte abnormality    COPD exacerbation (HCC)    Acute respiratory insufficiency    Chronic diastolic heart failure (HCC)    Tobacco use disorder    Nasal septal deviation    Hypertrophy of left inferior nasal turbinate    Rhinogenic headache    Asymmetrical sensorineural hearing loss    Tinnitus, left ear    Psychosis (HCC)    Substance-induced psychotic disorder (HCC)    Bipolar 1 disorder, depressed ([address]    Polysubstance abuse ([address]    Major depressive disorder, recurrent (HCC)    Stroke-like symptom    Right arm weakness    Delirium    Paresthesias    Depression, major, recurrent ([address]    Depression with suicidal ideation    Amnesia    Bipolar disorder, current episode mixed, moderate (HCC)    Ywiodkxkk-Wmackyj-Bmbszq disease    Carpal tunnel syndrome of left wrist    Change of, skin texture    Chronic midline low back pain without sciatica    Coronary vasospasm (HCC)    Derangement of knee    Disorder associated with Helicobacter species    Disturbance of skin sensation    Encounter for surgical follow-up care    Endometriosis    Environmental and seasonal allergies    Glaucoma suspect    History of arterial ischemic stroke    Mixed hyperlipidemia    Large liver    Lesion of ulnar nerve    Nasal congestion    Nicotine dependence    Pancreatic insufficiency    Primary osteoarthritis involving multiple joints    Sciatica    Sprain of right foot    Preoperative state    Type 2 diabetes mellitus without complication, without long-term current use of insulin (HCC)    Urinary incontinence, overflow    Elevated lactic acid level    Acute renal failure with acute cortical necrosis (HCC)    Pyelonephritis of left kidney    Pyelonephritis    Septicemia ([address]       Plan:     Consent obtained; Stage 1 SNS placement  in OR 10/09/2020.     Lluvia Johnson MD  6:27 PM 10/8/2020

## 2020-10-09 ENCOUNTER — ANESTHESIA EVENT (OUTPATIENT)
Dept: OPERATING ROOM | Age: 65
End: 2020-10-09
Payer: MEDICAID

## 2020-10-09 ENCOUNTER — HOSPITAL ENCOUNTER (OUTPATIENT)
Age: 65
Setting detail: OUTPATIENT SURGERY
Discharge: HOME | End: 2020-10-09
Attending: UROLOGY | Admitting: UROLOGY
Payer: MEDICAID

## 2020-10-09 ENCOUNTER — ANESTHESIA (OUTPATIENT)
Dept: OPERATING ROOM | Age: 65
End: 2020-10-09
Payer: MEDICAID

## 2020-10-09 VITALS
WEIGHT: 149 LBS | SYSTOLIC BLOOD PRESSURE: 102 MMHG | DIASTOLIC BLOOD PRESSURE: 56 MMHG | RESPIRATION RATE: 16 BRPM | HEIGHT: 64 IN | TEMPERATURE: 97.6 F | OXYGEN SATURATION: 96 % | HEART RATE: 86 BPM | BODY MASS INDEX: 25.44 KG/M2

## 2020-10-09 LAB
AMPHETAMINE+METHAMPHETAMINE URINE SCREEN: NORMAL
BARBITURATE QUANTITATIVE URINE: NEGATIVE
BENZODIAZEPINE QUANTITATIVE URINE: NEGATIVE
CANNABINOID QUANTITATIVE URINE: NEGATIVE
COCAINE METABOLITE QUANTITATIVE URINE: NORMAL
GLUCOSE BLD-MCNC: 138 MG/DL (ref 70–108)
OPIATES, URINE: NEGATIVE
OXYCODONE: NEGATIVE
PHENCYCLIDINE QUANTITATIVE URINE: NEGATIVE
POTASSIUM SERPL-SCNC: 4 MEQ/L (ref 3.5–5.2)

## 2020-10-09 PROCEDURE — 36415 COLL VENOUS BLD VENIPUNCTURE: CPT

## 2020-10-09 PROCEDURE — 82948 REAGENT STRIP/BLOOD GLUCOSE: CPT

## 2020-10-09 PROCEDURE — 2580000003 HC RX 258: Performed by: UROLOGY

## 2020-10-09 PROCEDURE — 80305 DRUG TEST PRSMV DIR OPT OBS: CPT

## 2020-10-09 PROCEDURE — 84132 ASSAY OF SERUM POTASSIUM: CPT

## 2020-10-09 PROCEDURE — 99212 OFFICE O/P EST SF 10 MIN: CPT | Performed by: UROLOGY

## 2020-10-09 RX ORDER — SODIUM CHLORIDE 9 MG/ML
INJECTION, SOLUTION INTRAVENOUS CONTINUOUS
Status: DISCONTINUED | OUTPATIENT
Start: 2020-10-09 | End: 2020-10-12 | Stop reason: HOSPADM

## 2020-10-09 RX ADMIN — SODIUM CHLORIDE: 9 INJECTION, SOLUTION INTRAVENOUS at 07:21

## 2020-10-09 ASSESSMENT — PAIN - FUNCTIONAL ASSESSMENT: PAIN_FUNCTIONAL_ASSESSMENT: 0-10

## 2020-10-09 ASSESSMENT — PAIN DESCRIPTION - DESCRIPTORS: DESCRIPTORS: ACHING;DISCOMFORT

## 2020-10-09 NOTE — PROGRESS NOTES
45-year-old white female came in today for stage I sacral neurostimulator nerve placement. Patient has a history of a heart murmur. Anesthesiologist ordered a urine screening test and it was positive for cocaine. Patient was asked about this. She does admit to using cocaine in the last week. I told her the reasons why the procedure had to be canceled. I asked her to call the office when she has things under better control regarding her drug use.

## 2020-10-09 NOTE — PROGRESS NOTES
Oriented to sds       8  Refuses flu and pneumonia vaccine. Family updated to stay in room. Informed family to take belonging with them if they leave. Keep nothing of value in room unattended. Medication given back to family. Family is taking them with them. Ipt was asked and agreed to first name and last initial being put on white boards. Allergy and fall risks applied. SCD Applied to patient. Warming blanket applied to patient. Pt denies any abuse or thoughts of suicide.

## 2020-10-15 ENCOUNTER — TELEPHONE (OUTPATIENT)
Dept: UROLOGY | Age: 65
End: 2020-10-15

## 2020-10-15 NOTE — TELEPHONE ENCOUNTER
Patient's surgery for 10/9/20 was cancelled due to the patient testing positive for cocaine. I spoke to the patient today and told her she will need to not use the cocaine at least 7 days prior to the procedure. Patient voiced understanding. Will contact Wilfred and reschedule.

## 2020-10-16 NOTE — TELEPHONE ENCOUNTER
SURGERY [address]  [facility] [address] [place], [address]      Phone *[phone] *[phone]   Surgical Scheduling Direct Line Phone *[phone] Fax *[phone]      Isdira Richie 1955 female    [address] [facility] [address]   Marital Status:          Home Phone: [phone]      Cell Phone:    Telephone Information:   Mobile [phone]          Surgeon: Dr. Gato Melendez  Surgery Date: 11/4/20   Time: 1:00 pm    Procedure: Stage 1 Interstim placement    Diagnosis: Urge Incontinence     Important Medical History: In Epic    Special Inst/Equip:Regular Room    CPT Codes:    81773  Latex Allergy:  No     Cardiac Device:  No     Anesthesia:  MAC          Admission Type:  Same Day                             Admit Prior to Day of Surgery: No    Case Location:  Main OR           Preadmission Testing:Phone Call              PAT Date and Time:______________________________________________________    PAT Confirmation #: ______________________________________________________    Post Op Visit: ___________________________________________________________    Need Preop Cardiac Clearance: Yes    Does Patient have Cardiologist/physician?      Dr Nely Duran cleared 9/9/20    Surgery Confirmation #: __________________________________________________    [employer]: ________________________   Date: __________________________     [employer] Name: Medicaid

## 2020-10-16 NOTE — TELEPHONE ENCOUNTER
Patient scheduled for surgery with Dr Regla Nuñez on 11/4/20. Surgery consent on arrival. Patient will do urinalysis and lab work on 10/23/20. Covid on 10/28/20. Patient has surgery instructions. She was also instructed not to use any illegal drugs 7 days prior to the surgery date. Patient voiced understanding.

## 2020-10-23 ENCOUNTER — HOSPITAL ENCOUNTER (OUTPATIENT)
Age: 65
Discharge: HOME | End: 2020-10-23
Payer: MEDICAID

## 2020-10-23 LAB
ANION GAP SERPL CALCULATED.3IONS-SCNC: 11 MEQ/L (ref 8–16)
BACTERIA: ABNORMAL /HPF
BILIRUBIN URINE: NEGATIVE
BLOOD, URINE: NEGATIVE
BUN BLDV-MCNC: 12 MG/DL (ref 7–22)
CALCIUM SERPL-MCNC: 10.8 MG/DL (ref 8.5–10.5)
CASTS 2: ABNORMAL /LPF
CASTS UA: ABNORMAL /LPF
CHARACTER, URINE: CLEAR
CHLORIDE BLD-SCNC: 106 MEQ/L (ref 98–111)
CO2: 24 MEQ/L (ref 23–33)
COLOR: YELLOW
CREAT SERPL-MCNC: 1 MG/DL (ref 0.4–1.2)
CRYSTALS, UA: ABNORMAL
EPITHELIAL CELLS, UA: ABNORMAL /HPF
GFR SERPL CREATININE-BSD FRML MDRD: 56 ML/MIN/1.73M2
GLUCOSE BLD-MCNC: 128 MG/DL (ref 70–108)
GLUCOSE URINE: NEGATIVE MG/DL
KETONES, URINE: NEGATIVE
LEUKOCYTE ESTERASE, URINE: ABNORMAL
MISCELLANEOUS 2: ABNORMAL
NITRITE, URINE: NEGATIVE
PH UA: 6 (ref 5–9)
POTASSIUM SERPL-SCNC: 4.8 MEQ/L (ref 3.5–5.2)
PROTEIN UA: NEGATIVE
RBC URINE: ABNORMAL /HPF
RENAL EPITHELIAL, UA: ABNORMAL
SODIUM BLD-SCNC: 141 MEQ/L (ref 135–145)
SPECIFIC GRAVITY, URINE: 1.01 (ref 1–1.03)
UROBILINOGEN, URINE: 1 EU/DL (ref 0–1)
WBC UA: ABNORMAL /HPF
YEAST: ABNORMAL

## 2020-10-23 PROCEDURE — 87077 CULTURE AEROBIC IDENTIFY: CPT

## 2020-10-23 PROCEDURE — 87186 SC STD MICRODIL/AGAR DIL: CPT

## 2020-10-23 PROCEDURE — 36415 COLL VENOUS BLD VENIPUNCTURE: CPT

## 2020-10-23 PROCEDURE — 81001 URINALYSIS AUTO W/SCOPE: CPT

## 2020-10-23 PROCEDURE — 80048 BASIC METABOLIC PNL TOTAL CA: CPT

## 2020-10-23 PROCEDURE — 87086 URINE CULTURE/COLONY COUNT: CPT

## 2020-10-26 LAB
ORGANISM: ABNORMAL
URINE CULTURE REFLEX: ABNORMAL

## 2020-10-27 ENCOUNTER — TELEPHONE (OUTPATIENT)
Dept: UROLOGY | Age: 65
End: 2020-10-27

## 2020-10-27 RX ORDER — AMOXICILLIN AND CLAVULANATE POTASSIUM 875; 125 MG/1; MG/1
1 TABLET, FILM COATED ORAL 2 TIMES DAILY
Qty: 28 TABLET | Refills: 0 | Status: ON HOLD | OUTPATIENT
Start: 2020-10-27 | End: 2020-11-04

## 2020-10-27 RX ORDER — FLUCONAZOLE 150 MG/1
150 TABLET ORAL ONCE
Qty: 1 TABLET | Refills: 1 | Status: SHIPPED | OUTPATIENT
Start: 2020-10-27 | End: 2020-10-27

## 2020-10-27 NOTE — TELEPHONE ENCOUNTER
I called and notified the patient that  she has a UTI. Dr. Oracio Solis is sending in Augmentin for the next 2 weeks. I am also prescribing Diflucan in case she gets a vaginal yeast infection. Both are being sent to [facility]. Patient voiced understanding.

## 2020-10-28 ENCOUNTER — PREP FOR PROCEDURE (OUTPATIENT)
Dept: UROLOGY | Age: 65
End: 2020-10-28

## 2020-10-28 ENCOUNTER — HOSPITAL ENCOUNTER (OUTPATIENT)
Age: 65
Discharge: HOME | End: 2020-10-28
Payer: MEDICAID

## 2020-10-28 PROCEDURE — U0003 INFECTIOUS AGENT DETECTION BY NUCLEIC ACID (DNA OR RNA); SEVERE ACUTE RESPIRATORY SYNDROME CORONAVIRUS 2 (SARS-COV-2) (CORONAVIRUS DISEASE [COVID-19]), AMPLIFIED PROBE TECHNIQUE, MAKING USE OF HIGH THROUGHPUT TECHNOLOGIES AS DESCRIBED BY CMS-2020-01-R: HCPCS

## 2020-10-28 RX ORDER — SODIUM CHLORIDE 9 MG/ML
INJECTION, SOLUTION INTRAVENOUS CONTINUOUS
Status: CANCELLED | OUTPATIENT
Start: 2020-11-04

## 2020-10-30 ENCOUNTER — TELEPHONE (OUTPATIENT)
Dept: UROLOGY | Age: 65
End: 2020-10-30

## 2020-10-30 LAB — SARS-COV-2: NOT DETECTED

## 2020-10-30 NOTE — TELEPHONE ENCOUNTER
[address] [facility] Preop Check Off    Preop testing- done 2 weeks prior and covid testing 1 week prior to surgery date. Continue to give arrival times and inform patients time is subject to change that day as it gets closer to the day of surgery.     PREOP check list      Surgeon Dr Chelsie Ferrer        Patient Name   Tabby Gorman      1955  MRN   [id number]    DOS    20   Diagnosis/Indication for Surgery  Urge incontinence  Procedure Stage 1 interstim placement    Allergies   Seasonal  UA  10/23/20   Leukocyte Large  Urine Culture  10/26/20  Abnormal   Patient started on Augmentin by Dr Chelsie Ferrer  Blood thinners  No     EKG. 5/3/20  Abnormal  CXR- 20  Normal    COVID  10/28/20  Negative    Clearance:  Cardiac- Dr Nena Hester to surgery- No

## 2020-10-31 ENCOUNTER — APPOINTMENT (OUTPATIENT)
Dept: GENERAL RADIOLOGY | Age: 65
End: 2020-10-31
Payer: MEDICAID

## 2020-10-31 ENCOUNTER — HOSPITAL ENCOUNTER (EMERGENCY)
Age: 65
Discharge: HOME | End: 2020-10-31
Payer: MEDICAID

## 2020-10-31 VITALS
HEART RATE: 66 BPM | SYSTOLIC BLOOD PRESSURE: 127 MMHG | RESPIRATION RATE: 16 BRPM | TEMPERATURE: 97.8 F | DIASTOLIC BLOOD PRESSURE: 76 MMHG | OXYGEN SATURATION: 95 %

## 2020-10-31 LAB
ALBUMIN SERPL-MCNC: 3.9 G/DL (ref 3.5–5.1)
ALP BLD-CCNC: 58 U/L (ref 38–126)
ALT SERPL-CCNC: 24 U/L (ref 11–66)
ANION GAP SERPL CALCULATED.3IONS-SCNC: 11 MEQ/L (ref 8–16)
AST SERPL-CCNC: 20 U/L (ref 5–40)
BACTERIA: ABNORMAL /HPF
BASOPHILS # BLD: 0.5 %
BASOPHILS ABSOLUTE: 0 THOU/MM3 (ref 0–0.1)
BILIRUB SERPL-MCNC: 0.2 MG/DL (ref 0.3–1.2)
BILIRUBIN URINE: NEGATIVE
BLOOD, URINE: NEGATIVE
BUN BLDV-MCNC: 11 MG/DL (ref 7–22)
CALCIUM SERPL-MCNC: 10.7 MG/DL (ref 8.5–10.5)
CASTS 2: ABNORMAL /LPF
CASTS UA: ABNORMAL /LPF
CHARACTER, URINE: CLEAR
CHLORIDE BLD-SCNC: 102 MEQ/L (ref 98–111)
CO2: 22 MEQ/L (ref 23–33)
COLOR: YELLOW
CREAT SERPL-MCNC: 0.8 MG/DL (ref 0.4–1.2)
CRYSTALS, UA: ABNORMAL
EOSINOPHIL # BLD: 1.2 %
EOSINOPHILS ABSOLUTE: 0.1 THOU/MM3 (ref 0–0.4)
EPITHELIAL CELLS, UA: ABNORMAL /HPF
ERYTHROCYTE [DISTWIDTH] IN BLOOD BY AUTOMATED COUNT: 13.5 % (ref 11.5–14.5)
ERYTHROCYTE [DISTWIDTH] IN BLOOD BY AUTOMATED COUNT: 44.3 FL (ref 35–45)
GFR SERPL CREATININE-BSD FRML MDRD: 72 ML/MIN/1.73M2
GLUCOSE BLD-MCNC: 111 MG/DL (ref 70–108)
GLUCOSE URINE: NEGATIVE MG/DL
HCT VFR BLD CALC: 39.5 % (ref 37–47)
HEMOGLOBIN: 12.6 GM/DL (ref 12–16)
IMMATURE GRANS (ABS): 0.02 THOU/MM3 (ref 0–0.07)
IMMATURE GRANULOCYTES: 0.3 %
KETONES, URINE: NEGATIVE
LEUKOCYTE ESTERASE, URINE: ABNORMAL
LYMPHOCYTES # BLD: 41.8 %
LYMPHOCYTES ABSOLUTE: 3.3 THOU/MM3 (ref 1–4.8)
MCH RBC QN AUTO: 28.6 PG (ref 26–33)
MCHC RBC AUTO-ENTMCNC: 31.9 GM/DL (ref 32.2–35.5)
MCV RBC AUTO: 89.8 FL (ref 81–99)
MISCELLANEOUS 2: ABNORMAL
MONOCYTES # BLD: 5 %
MONOCYTES ABSOLUTE: 0.4 THOU/MM3 (ref 0.4–1.3)
NITRITE, URINE: NEGATIVE
NUCLEATED RED BLOOD CELLS: 0 /100 WBC
OSMOLALITY CALCULATION: 270.2 MOSMOL/KG (ref 275–300)
PH UA: 5 (ref 5–9)
PLATELET # BLD: 358 THOU/MM3 (ref 130–400)
PMV BLD AUTO: 9.5 FL (ref 9.4–12.4)
POTASSIUM REFLEX MAGNESIUM: 4.5 MEQ/L (ref 3.5–5.2)
PROTEIN UA: NEGATIVE
RBC # BLD: 4.4 MILL/MM3 (ref 4.2–5.4)
RBC URINE: ABNORMAL /HPF
RENAL EPITHELIAL, UA: ABNORMAL
SEG NEUTROPHILS: 51.2 %
SEGMENTED NEUTROPHILS ABSOLUTE COUNT: 4 THOU/MM3 (ref 1.8–7.7)
SODIUM BLD-SCNC: 135 MEQ/L (ref 135–145)
SPECIFIC GRAVITY, URINE: 1.02 (ref 1–1.03)
TOTAL PROTEIN: 6.8 G/DL (ref 6.1–8)
UROBILINOGEN, URINE: 0.2 EU/DL (ref 0–1)
WBC # BLD: 7.8 THOU/MM3 (ref 4.8–10.8)
WBC UA: ABNORMAL /HPF
YEAST: ABNORMAL

## 2020-10-31 PROCEDURE — 6370000000 HC RX 637 (ALT 250 FOR IP): Performed by: EMERGENCY MEDICINE

## 2020-10-31 PROCEDURE — 81001 URINALYSIS AUTO W/SCOPE: CPT

## 2020-10-31 PROCEDURE — 87086 URINE CULTURE/COLONY COUNT: CPT

## 2020-10-31 PROCEDURE — 80053 COMPREHEN METABOLIC PANEL: CPT

## 2020-10-31 PROCEDURE — 6360000002 HC RX W HCPCS: Performed by: EMERGENCY MEDICINE

## 2020-10-31 PROCEDURE — 85025 COMPLETE CBC W/AUTO DIFF WBC: CPT

## 2020-10-31 PROCEDURE — 72100 X-RAY EXAM L-S SPINE 2/3 VWS: CPT

## 2020-10-31 PROCEDURE — 96374 THER/PROPH/DIAG INJ IV PUSH: CPT

## 2020-10-31 PROCEDURE — 99282 EMERGENCY DEPT VISIT SF MDM: CPT

## 2020-10-31 PROCEDURE — 36415 COLL VENOUS BLD VENIPUNCTURE: CPT

## 2020-10-31 RX ORDER — ONDANSETRON 2 MG/ML
4 INJECTION INTRAMUSCULAR; INTRAVENOUS ONCE
Status: COMPLETED | OUTPATIENT
Start: 2020-10-31 | End: 2020-10-31

## 2020-10-31 RX ORDER — HYDROCODONE BITARTRATE AND ACETAMINOPHEN 5; 325 MG/1; MG/1
1 TABLET ORAL ONCE
Status: COMPLETED | OUTPATIENT
Start: 2020-10-31 | End: 2020-10-31

## 2020-10-31 RX ADMIN — HYDROCODONE BITARTRATE AND ACETAMINOPHEN 1 TABLET: 5; 325 TABLET ORAL at 17:45

## 2020-10-31 RX ADMIN — ONDANSETRON 4 MG: 2 INJECTION INTRAMUSCULAR; INTRAVENOUS at 17:45

## 2020-10-31 ASSESSMENT — PAIN SCALES - GENERAL
PAINLEVEL_OUTOF10: 10
PAINLEVEL_OUTOF10: 10

## 2020-10-31 ASSESSMENT — ENCOUNTER SYMPTOMS
SHORTNESS OF BREATH: 0
VOMITING: 0
BACK PAIN: 1
NAUSEA: 1
COUGH: 0
COLOR CHANGE: 0

## 2020-10-31 ASSESSMENT — PAIN DESCRIPTION - ORIENTATION: ORIENTATION: LEFT;RIGHT;LOWER

## 2020-10-31 ASSESSMENT — PAIN DESCRIPTION - LOCATION: LOCATION: BACK

## 2020-10-31 ASSESSMENT — PAIN DESCRIPTION - PAIN TYPE: TYPE: ACUTE PAIN

## 2020-10-31 NOTE — ED PROVIDER NOTES
[address]       Chief Complaint   Patient presents with    Back Pain     radiates down bilat legs    Nausea       Nurses Notes reviewed and I agree except as noted in the HPI. HISTORY OF PRESENT ILLNESS    Safia Murillo is a 63 y.o. female who presents to the Emergency Department for the evaluation of low back pain with no known injury. Nausea. States pain sometimes shoots down her left leg. Patient does report that she is being evaluated for recurrent urinary tract infections. She has had pyelonephritis. She was recently on Augmentin for urinary tract infection. She is scheduled to have a neurostimulator placed for the bladder in the near future. Patient believes that her back pain is likely from her back and not from urinary origin. The HPI was provided by the patient. REVIEW OF SYSTEMS     Review of Systems   Constitutional: Positive for fatigue. Negative for fever. Respiratory: Negative for cough and shortness of breath. Gastrointestinal: Positive for nausea. Negative for vomiting. Genitourinary: Positive for difficulty urinating. Negative for dysuria, flank pain and frequency. Musculoskeletal: Positive for back pain. Negative for neck pain and neck stiffness. Skin: Negative for color change. Neurological: Negative for dizziness and light-headedness. Psychiatric/Behavioral: Negative for behavioral problems.        PAST MEDICAL HISTORY    has a past medical history of Acute renal failure with acute cortical necrosis ([address] Allergic rhinitis, Arthritis, Bipolar 1 disorder ([address] Bipolar disorder ([address] Blood circulation, collateral, Cancer ([address] Colon polyps, COPD (chronic obstructive pulmonary disease) ([address] Coronary vasospasm (HCC), Depression, Diverticulitis of colon, GERD (gastroesophageal reflux disease), Hiatal hernia, History of arterial ischemic stroke, History of colonoscopy, History of kidney stones, Hx of blood clots, Hyperlipidemia, Hypertension, Liver disease, Pneumonia, Suicidal thoughts, Thyroid disease, Type 2 diabetes mellitus without complication, without long-term current use of insulin ([address] Vomiting, Wears dentures, and Wears glasses. SURGICAL HISTORY      has a past surgical history that includes Mandible fracture surgery (1984); shoulder surgery (2014); Colonoscopy (2011); Dilatation, esophagus; Elbow surgery (Right, 10/7/2004); Rotator cuff repair (2004, 2014); skin biopsy (2006); Cholecystectomy, laparoscopic (6/12/15); Elbow surgery (Bilateral, 2016); Carpal tunnel release (Bilateral, 2016); Hysterectomy (1998); Cardiac surgery; Abdomen surgery; and cystoscopy w biopsy of bladder (N/A, 7/13/2020).     CURRENT MEDICATIONS       Discharge Medication List as of 10/31/2020  7:10 PM      CONTINUE these medications which have NOT CHANGED    Details   amoxicillin-clavulanate (AUGMENTIN) 875-125 MG per tablet Take 1 tablet by mouth 2 times daily for 14 days, Disp-28 tablet,R-0Normal      Respiratory Therapy Supplies (NEBULIZER/TUBING/MOUTHPIECE) KIT EVERY 6 HOURS PRN Starting Wed 8/26/2020, Until Thu 8/26/2021, For 365 days, Disp-1 kit,R-1, Print      albuterol (PROVENTIL) (2.5 MG/3ML) 0.083% nebulizer solution Take 3 mLs by nebulization every 6 hours as needed for Wheezing, Disp-120 each,R-0Normal      ibuprofen (IBU) 400 MG tablet Take 1 tablet by mouth every 6 hours as needed for Pain, Disp-120 tablet,R-0Print      Roflumilast (DALIRESP) 500 MCG tablet Take 500 mcg by mouth dailyHistorical Med      Tiotropium Bromide Monohydrate (SPIRIVA HANDIHALER IN) Inhale 2 puffs into the lungs dailyHistorical Med      Calcium Carbonate 500 MG CHEW Take by mouth 2 tabs as neededHistorical Med      oxymetazoline (GNP NASAL SPRAY EXTRA MOIST) 0.05 % nasal spray 2 sprays by Nasal route 2 times dailyHistorical Med      acetaminophen (MAPAP) 325 MG tablet Take 650 mg by mouth every 6 hours as needed for PainHistorical Med      potassium chloride (KLOR-CON M) 20 MEQ extended release tablet Take 40 mEq by mouth daily Historical Med      metFORMIN (GLUCOPHAGE) 1000 MG tablet Take 1,000 mg by mouth 2 times daily (with meals)Historical Med      topiramate (TOPAMAX) 50 MG tablet Take 1 tablet by mouth 2 times daily, Disp-60 tablet, R-3Normal      hydroCHLOROthiazide (HYDRODIURIL) 25 MG tablet TAKE 1 TABLET BY MOUTH TWICE DAILY, Disp-60 tablet, R-1Normal      Pancrelipase, Lip-Prot-Amyl, (ZENPEP) 97565-363005 units CPEP 2 caps 8/12/1600 1 cap at HS, Disp-120 capsule, R-0Print      niacin 250 MG extended release capsule Take 2 capsules by mouth nightly, Disp-30 capsule, R-0NO PRINT      levothyroxine (SYNTHROID) 75 MCG tablet Take 1 tablet by mouth daily everyday except Sunday take 150 mcg., Disp-30 tablet, R-0NO PRINT      fluticasone (FLONASE) 50 MCG/ACT nasal spray 1 spray by Each Nostril route 2 times daily, Disp-1 Bottle, R-0NO PRINT      ferrous sulfate 325 (65 Fe) MG tablet Take 1 tablet by mouth 2 times daily, Disp-30 tablet, R-0NO PRINT      albuterol sulfate HFA (VENTOLIN HFA) 108 (90 Base) MCG/ACT inhaler Inhale 2 puffs into the lungs every 6 hours as needed for Wheezing, Disp-1 Inhaler, U-9PG PRINT      folic acid (FOLVITE) 1 MG tablet Take 1 mg by mouth dailyHistorical Med      ARIPiprazole (ABILIFY) 2 MG tablet Take 2 mg by mouth dailyHistorical Med      atorvastatin (LIPITOR) 40 MG tablet Take 40 mg by mouth nightly Historical Med      !! escitalopram (LEXAPRO) 20 MG tablet Take 20 mg by mouth dailyHistorical Med      !! escitalopram (LEXAPRO) 10 MG tablet Take 10 mg by mouth dailyHistorical Med      fenofibrate 160 MG tablet Take 145 mg by mouth daily Historical Med      nystatin (MYCOSTATIN) POWD powder Apply 1 each topically 2 times dailyHistorical Med      hydrOXYzine (VISTARIL) 50 MG capsule Take 50 mg by mouth 3 times daily as needed for Itching or Anxiety Historical Med      traZODone (DESYREL) 100 MG tablet Take 2 tablets by mouth nightly, Disp-30 tablet, R-0Normal      QUEtiapine (SEROQUEL XR) 300 MG extended release tablet Take 2 tablets by mouth nightly, Disp-30 tablet, R-0Normal      isosorbide dinitrate (ISORDIL) 10 MG tablet TAKE 1 TABLET BY MOUTH 3 TIMES DAILY, Disp-270 tablet, R-3Maximum Refills ReachedNormal      carvedilol (COREG) 3.125 MG tablet TAKE 1 TABLET BY MOUTH 2 TIMES DAILY (WITH MEALS), Disp-180 tablet, R-3Maximum Refills ReachedNormal      pantoprazole (PROTONIX) 40 MG tablet Take 1 tablet by mouth daily Indications: Gastroesophageal Reflux Disease, Disp-10 tablet, R-0Print       !! - Potential duplicate medications found. Please discuss with provider. ALLERGIES     is allergic to seasonal.    FAMILY HISTORY     She indicated that her mother is . She indicated that her father is . She indicated that her sister is alive. She indicated that her brother is alive. She indicated that the status of her maternal grandmother is unknown. She indicated that the status of her maternal grandfather is unknown. She indicated that the status of her paternal grandmother is unknown. She indicated that the status of her paternal grandfather is unknown. She indicated that the status of her maternal uncle is unknown.   family history includes Cancer in her father, mother, and sister; Depression in her father; Diabetes in her maternal grandmother; Early Death in her maternal grandfather; Heart Attack in her sister; Heart Disease in her father, maternal grandfather, maternal grandmother, maternal uncle, mother, paternal grandfather, and paternal grandmother; High Blood Pressure in her mother; High Cholesterol in her father, maternal grandfather, maternal grandmother, maternal uncle, mother, paternal grandfather, and paternal grandmother; Mental Illness in her father; Stroke in her maternal grandfather. SOCIAL HISTORY      reports that she has been smoking cigarettes.  She started smoking about 43 years ago. She has a 30.00 pack-year smoking history. She has never used smokeless tobacco. She reports current drug use. Frequency: 1.00 time per week. Drug: Cocaine. She reports that she does not drink alcohol. PHYSICAL EXAM     INITIAL VITALS:  oral temperature is 97.8 °F (36.6 °C). Her blood pressure is 127/76 and her pulse is 66. Her respiration is 16 and oxygen saturation is 95%. Physical Exam  Constitutional:       General: She is not in acute distress. Appearance: She is normal weight. She is not ill-appearing. HENT:      Head: Normocephalic. Nose: Nose normal.      Mouth/Throat:      Mouth: Mucous membranes are moist.   Cardiovascular:      Rate and Rhythm: Normal rate and regular rhythm. Pulses: Normal pulses. Heart sounds: Normal heart sounds. Pulmonary:      Effort: Pulmonary effort is normal.   Abdominal:      General: Abdomen is flat. Bowel sounds are normal. There is no distension. Tenderness: There is no abdominal tenderness. Musculoskeletal:      Lumbar back: She exhibits tenderness. Skin:     General: Skin is warm. Capillary Refill: Capillary refill takes less than 2 seconds. Neurological:      General: No focal deficit present. Mental Status: She is alert. Psychiatric:         Mood and Affect: Mood normal.         Behavior: Behavior normal.          DIFFERENTIAL DIAGNOSIS:   Lumbar strain, UTI, acute kidney injury    DIAGNOSTIC RESULTS     EKG: All EKG's are interpreted by the Emergency Department Physician who either signs or Co-signs this chart in the absence of a cardiologist.    None    RADIOLOGY: non-plainfilm images(s) such as CT, Ultrasound and MRI are read by the radiologist.    XR LUMBAR SPINE (2-3 VIEWS)   Final Result   No acute abnormality            **This report has been created using voice recognition software. It may contain minor errors which are inherent in voice recognition technology. **      Final report electronically signed by Dr. Boston Burns on 10/31/2020 5:47 PM          LABS:     Labs Reviewed   CBC WITH AUTO DIFFERENTIAL - Abnormal; Notable for the following components:       Result Value    MCHC 31.9 (*)     All other components within normal limits   COMPREHENSIVE METABOLIC PANEL W/ REFLEX TO MG FOR LOW K - Abnormal; Notable for the following components:    Glucose 111 (*)     CO2 22 (*)     Calcium 10.7 (*)     Total Bilirubin 0.2 (*)     All other components within normal limits   GLOMERULAR FILTRATION RATE, ESTIMATED - Abnormal; Notable for the following components:    Est, Glom Filt Rate 72 (*)     All other components within normal limits   OSMOLALITY - Abnormal; Notable for the following components:    Osmolality Calc 270.2 (*)     All other components within normal limits   URINE WITH REFLEXED MICRO - Abnormal; Notable for the following components:    Leukocyte Esterase, Urine SMALL (*)     All other components within normal limits   CULTURE, REFLEXED, URINE    Narrative:     Source: urine, clean catch       Site:           Current Antibiotics: not stated   ANION GAP       EMERGENCY DEPARTMENT COURSE:   Vitals:    Vitals:    10/31/20 1515   BP: 127/76   Pulse: 66   Resp: 16   Temp: 97.8 °F (36.6 °C)   TempSrc: Oral   SpO2: 95%       6:26 PM EDT: The patient was seen and evaluated. Patient's labs are reassuring. Her kidney function is essentially normal.  Electrolytes normal.  Liver enzymes normal.  White blood cell count within normal limits. H&H remained stable. Urinalysis has small amount of leukocyte esterase, otherwise no obvious infection that would cause her symptoms. MDM:  The patient likely has acute exacerbation of chronic back pain. She was treated with Norco and Zofran in the ED but is advised to continue her ibuprofen that she has at home upon discharge. Follow-up primary care provider for reevaluation.   Follow-up with Dr. Torrie Jacinto regarding urinary issues in nerve stimulator that is already planned. Return for severe worsening pain, temperature 100.5 or greater, increase in urinary symptoms, new concerns. CRITICAL CARE:   None    CONSULTS:  None    PROCEDURES:  None    FINAL IMPRESSION      1. Lumbar back pain with radiculopathy affecting left lower extremity          DISPOSITION/PLAN   discharge    PATIENT REFERRED TO:  family physician    In 2 days        DISCHARGE MEDICATIONS:  Discharge Medication List as of 10/31/2020  7:10 PM          (Please note that portions of this note were completed with a voice recognition program.  Efforts were made to edit the dictations but occasionally words are mis-transcribed.)    The patient was given an opportunity to see the Emergency Attending. The patient voiced understanding that I was a Mid-LevelProvider and was in agreement with being seen independently by myself.           Lidya Clark, BA - CNP  10/31/20 1939

## 2020-10-31 NOTE — ED TRIAGE NOTES
Pt to the ED with lower back pain that shoots down her legs. Noted since she moved x 2 weeks ago and was moving boxes that she had the low back pain. Pain is uncontrolled at home by ibuprofen. Reports some nausea as well.

## 2020-11-02 LAB — URINE CULTURE REFLEX: NORMAL

## 2020-11-03 NOTE — H&P
Nicho Mcrae MD  History and Physical    Patient:  Evita Kothari  MRN: [id number]  YOB: 1955    HISTORY OF PRESENT ILLNESS:     The patient is a 63 y.o. female who presents with urgency, frequency and urge incontinence. Here for procedure. Patient's old records, notes and chart reviewed and summarized above. Nicho Mcrae MD independently reviewed the images and verified the radiology reports from:    Xr Lumbar Spine (2-3 Views)    Result Date: 10/31/2020  PROCEDURE: XR LUMBAR SPINE (2-3 VIEWS) CLINICAL INFORMATION: lumbar back pain, no injury . COMPARISON: 2/9/2018 TECHNIQUE: AP and lateral FINDINGS: No acute fracture or subluxation. There is probable L5-S1 spondylolysis without associated anterolisthesis. There is stable loss of vertebral body height of T12 and T11. Pedicles are intact. Vertebral bodies are normally aligned. Disc spaces are generally preserved. Bones are osteopenic. No acute abnormality **This report has been created using voice recognition software. It may contain minor errors which are inherent in voice recognition technology. ** Final report electronically signed by Dr. Julia Campos on 10/31/2020 5:47 PM        Past Medical History:    Past Medical History:   Diagnosis Date    Acute renal failure with acute cortical necrosis ([address] 12/21/2019    Allergic rhinitis     Arthritis     spine    Bipolar 1 disorder ([address]     Bipolar disorder ([address]     Blood circulation, collateral     Cancer ([address] 2011    cancerous polyps removed - Dr. Lion Figueroa Colon polyps     COPD (chronic obstructive pulmonary disease) ([address] 7/24/2014    Coronary vasospasm ([address] 8/17/2019    Depression     Diverticulitis of colon     GERD (gastroesophageal reflux disease)     Hiatal hernia     History of arterial ischemic stroke 7/20/2019    History of colonoscopy 2002    History of kidney stones     Hx of blood clots 6/17/2014    PE and collar bone area after shoulder surgery  Hyperlipidemia     Hypertension     Liver disease     enlarged liver - damaged with alcohol in past but no cirrhosis per patient    Pneumonia 7/24/2014    Suicidal thoughts     2015 admitted to  from [address] Thyroid disease     Type 2 diabetes mellitus without complication, without long-term current use of insulin ([facility] 7/20/2019    Vomiting     Wears dentures     Wears glasses        Past Surgical History:    Past Surgical History:   Procedure Laterality Date    ABDOMEN SURGERY      x4 removed cysts and ovary removed    CARDIAC SURGERY      heart caths, no stents    CARPAL TUNNEL RELEASE Bilateral 2016    CHOLECYSTECTOMY, LAPAROSCOPIC  6/12/15    Dr. Maribeth Gallagher N/A 7/13/2020    CYSTOSCOPY WITH BLADDER BIOPSIES performed by Anitha Huertas MD at [facility], [address] Right 10/7/2004    Dr. Moreno Postal Bilateral 2016    decompression   Carlos Jackielars PABONH with [facility]    ROTATOR CUFF REPAIR  2004, 2014    right shoulder    SHOULDER SURGERY  2014    right    SKIN BIOPSY  2006    under left eye       Medications Prior to Admission:    Prior to Admission medications    Medication Sig Start Date End Date Taking?  Authorizing Provider   amoxicillin-clavulanate (AUGMENTIN) 875-125 MG per tablet Take 1 tablet by mouth 2 times daily for 14 days 10/27/20 11/10/20  Anitha Huertas MD   Respiratory Therapy Supplies (NEBULIZER/TUBING/MOUTHPIECE) KIT 1 kit by Does not apply route every 6 hours as needed (shortness of breath) 8/26/20 8/26/21  BA Worley - CNP   albuterol (PROVENTIL) (2.5 MG/3ML) 0.083% nebulizer solution Take 3 mLs by nebulization every 6 hours as needed for Wheezing 8/26/20   BA Wong CNP   ibuprofen (IBU) 400 MG tablet Take 1 tablet by mouth every 6 hours as needed for Pain 8/22/20   Kirsten Bowers MD Roflumilast (DALIRESP) 500 MCG tablet Take 500 mcg by mouth daily    Historical Provider, MD   Tiotropium Bromide Monohydrate (SPIRIVA HANDIHALER IN) Inhale 2 puffs into the lungs daily    Historical Provider, MD   Calcium Carbonate 500 MG CHEW Take by mouth 2 tabs as needed    Historical Provider, MD   oxymetazoline (GNP NASAL SPRAY EXTRA MOIST) 0.05 % nasal spray 2 sprays by Nasal route 2 times daily    Historical Provider, MD   acetaminophen (MAPAP) 325 MG tablet Take 650 mg by mouth every 6 hours as needed for Pain    Historical Provider, MD   potassium chloride (KLOR-CON M) 20 MEQ extended release tablet Take 40 mEq by mouth daily  5/20/20   Historical Provider, MD   metFORMIN (GLUCOPHAGE) 1000 MG tablet Take 1,000 mg by mouth 2 times daily (with meals)    Historical Provider, MD   topiramate (TOPAMAX) 50 MG tablet Take 1 tablet by mouth 2 times daily 4/21/20   BA Wills CNP   hydroCHLOROthiazide (HYDRODIURIL) 25 MG tablet TAKE 1 TABLET BY MOUTH TWICE DAILY  Patient taking differently: Take 25 mg by mouth daily  4/20/20   Yoon Araya MD   Pancrelipase, Lip-Prot-Amyl, (ZENPEP) 38415-706698 units CPEP 2 caps 8/12/1600 1 cap at [facility] 12/26/19   BA Diaz CNP   niacin 250 MG extended release capsule Take 2 capsules by mouth nightly  Patient taking differently: Take 500 mg by mouth nightly Currently on 750mg tablet at bedtime 12/26/19 12/25/20  BA Amado CNP   levothyroxine (SYNTHROID) 75 MCG tablet Take 1 tablet by mouth daily everyday except Sunday take 150 mcg. 12/26/19   BA Amado CNP   fluticasone (FLONASE) 50 MCG/ACT nasal spray 1 spray by Each Nostril route 2 times daily 12/26/19   BA Amado CNP   ferrous sulfate 325 (65 Fe) MG tablet Take 1 tablet by mouth 2 times daily 12/26/19   BA Diaz CNP   albuterol sulfate HFA (VENTOLIN HFA) 108 (90 Base) MCG/ACT inhaler Inhale 2 puffs into the lungs every 6 hours as needed for Wheezing 12/26/19   Birdie Spray BA Patel - CNP   folic acid (FOLVITE) 1 MG tablet Take 1 mg by mouth daily    Historical Provider, MD   ARIPiprazole (ABILIFY) 2 MG tablet Take 2 mg by mouth daily    Historical Provider, MD   atorvastatin (LIPITOR) 40 MG tablet Take 40 mg by mouth nightly     Historical Provider, MD   escitalopram (LEXAPRO) 20 MG tablet Take 20 mg by mouth daily    Historical Provider, MD   escitalopram (LEXAPRO) 10 MG tablet Take 10 mg by mouth daily    Historical Provider, MD   fenofibrate 160 MG tablet Take 145 mg by mouth daily     Historical Provider, MD   nystatin (MYCOSTATIN) POWD powder Apply 1 each topically 2 times daily    Historical Provider, MD   hydrOXYzine (VISTARIL) 50 MG capsule Take 50 mg by mouth 3 times daily as needed for Itching or Anxiety     Historical Provider, MD   traZODone (DESYREL) 100 MG tablet Take 2 tablets by mouth nightly  Patient taking differently: Take 150 mg by mouth nightly 2 tabs prn 10/31/18   Cyndi Salinas MD   QUEtiapine (SEROQUEL XR) 300 MG extended release tablet Take 2 tablets by mouth nightly  Patient taking differently: Take 600 mg by mouth nightly  10/31/18   Cyndi Salinas MD   isosorbide dinitrate (ISORDIL) 10 MG tablet TAKE 1 TABLET BY MOUTH 3 TIMES DAILY 9/21/18   Renita Mckinney PA-C   carvedilol (COREG) 3.125 MG tablet TAKE 1 TABLET BY MOUTH 2 TIMES DAILY (WITH MEALS) 9/21/18   Renita Mckinney PA-C   pantoprazole (PROTONIX) 40 MG tablet Take 1 tablet by mouth daily Indications: Gastroesophageal Reflux Disease 4/15/16   Mera Collins MD       Allergies:  Seasonal    Social History:    Social History     Socioeconomic History    Marital status:      Spouse name: Not on file    Number of children: 3    Years of education: 15    Highest education level: Not on file   Occupational History    Occupation: on disability     Employer: [employer] Ne[employer]    Financial resource strain: Not on file    Food insecurity     Worry: Not on file     Inability: Not on file    Transportation needs     Medical: Not on file     Non-medical: Not on file   Tobacco Use    Smoking status: Current Every Day Smoker     Packs/day: 1.00     Years: 30.00     Pack years: 30.00     Types: Cigarettes     Start date: 4/22/1977    Smokeless tobacco: Never Used   Substance and Sexual Activity    Alcohol use: No     Comment: none for 2 years    Drug use: Yes     Frequency: 1.0 times per week     Types: Cocaine     Comment: [id number]    Sexual activity: Not Currently   Lifestyle    Physical activity     Days per week: Not on file     Minutes per session: Not on file    Stress: Not on file   Relationships    Social connections     Talks on phone: Not on file     Gets together: Not on file     Attends Oriental orthodox service: Not on file     Active member of club or organization: Not on file     Attends meetings of clubs or organizations: Not on file     Relationship status: Not on file    Intimate partner violence     Fear of current or ex partner: Not on file     Emotionally abused: Not on file     Physically abused: Not on file     Forced sexual activity: Not on file   Other Topics Concern    Not on file   Social History Narrative    Not on file       Family History:    Family History   Problem Relation Age of Onset    Cancer Mother     Heart Disease Mother     High Blood Pressure Mother     High Cholesterol Mother     Cancer Father         brain    Depression Father     Heart Disease Father     High Cholesterol Father     Mental Illness Father     Cancer Sister     Heart Attack Sister     Heart Disease Maternal Uncle     High Cholesterol Maternal Uncle     Diabetes Maternal Grandmother     Heart Disease Maternal Grandmother     High Cholesterol Maternal Grandmother     Early Death Maternal Grandfather     Heart Disease Maternal Grandfather     High Cholesterol Maternal Grandfather     Stroke Maternal Grandfather     Heart Disease Paternal Grandmother     High Cholesterol Paternal Grandmother     Heart Disease Paternal Grandfather     High Cholesterol Paternal Grandfather        REVIEW OF SYSTEMS:  Constitutional: negative  Eyes: negative  Respiratory: negative  Cardiovascular: negative  Gastrointestinal: negative  Genitourinary: no acute issues  Musculoskeletal: negative  Skin: negative   Neurological: negative  Hematological/Lymphatic: negative  Psychological: negative    Physical Exam:      No data found. Constitutional: Patient in no acute distress; Neuro: alert and oriented to person place and time. Psych: Mood and affect normal.  Skin: Normal  Lungs: Respiratory effort normal, CTA  Cardiovascular:  Normal peripheral pulses; no murmur. Normal rhythm  Abdomen: Soft, non-tender, non-distended with no CVA, flank pain, hepatosplenomegaly or hernia. Kidneys normal.  Bladder non-tender and not distended.       LABS:   Recent Labs     10/31/20  1737   WBC 7.8   HGB 12.6   HCT 39.5   MCV 89.8        Recent Labs     10/31/20  1737      K 4.5      CO2 22*   BUN 11   CREATININE 0.8     No results found for: PSA      Urinalysis:   Recent Labs     10/31/20  1836   COLORU YELLOW   PHUR 5.0   WBCUA 15-25   RBCUA 3-5   YEAST NONE SEEN   BACTERIA NONE SEEN   LEUKOCYTESUR SMALL*   UROBILINOGEN 0.2   BILIRUBINUR NEGATIVE   BLOODU NEGATIVE        -----------------------------------------------------------------      Assessment and Plan     Impression:    Patient Active Problem List   Diagnosis    GERD (gastroesophageal reflux disease)    Colon polyps    Chest pain, atypical    Gastroenteritis    Pneumonia    Shoulder pain    History of pulmonary embolism    COPD (chronic obstructive pulmonary disease) (HCC)    Hypoglycemia    Anticoagulated on Coumadin    Bipolar disorder (HCC)    Cannabis abuse    Cocaine abuse ([facility]    Alcohol dependence in remission ([address]    Cholecystitis with cholelithiasis    GI bleed    Erosive esophagitis    Hypokalemia    HTN (hypertension), benign    Bipolar 1 disorder (HCC)    Chronic pain    Hiatal hernia    Chest pain    Vomiting    Erosive gastritis    H pylori ulcer    Hematemesis    History of Helicobacter pylori infection    Intractable abdominal pain    Intractable vomiting with nausea    Epigastric abdominal pain    Acute blood loss anemia    Chronic chest pain    Hyponatremia    Metabolic acidosis    Essential hypertension    COPD with acute exacerbation (HCC)    Hypothyroidism    History of DVT (deep vein thrombosis)    Depression    Tobacco abuse    Hematemesis with nausea    Hypoxia    Pleural effusion, bilateral    Suicidal ideation    Bipolar disorder, in partial remission, most recent episode depressed (HCC)    Bipolar II disorder ([address]    Diastolic dysfunction    Angina, class II ([facility]    Unstable angina (HCC)    Dyslipidemia    Electrolyte abnormality    COPD exacerbation (HCC)    Acute respiratory insufficiency    Chronic diastolic heart failure ([facility]    Tobacco use disorder    Nasal septal deviation    Hypertrophy of left inferior nasal turbinate    Rhinogenic headache    Asymmetrical sensorineural hearing loss    Tinnitus, left ear    Psychosis ([address]    Substance-induced psychotic disorder (HCC)    Bipolar 1 disorder, depressed ([address]    Polysubstance abuse ([address]    Major depressive disorder, recurrent (HCC)    Stroke-like symptom    Right arm weakness    Delirium    Paresthesias    Depression, major, recurrent ([address]    Depression with suicidal ideation    Amnesia    Bipolar disorder, current episode mixed, moderate (HCC)    Orhnrslam-Odewdqi-Ncqnss disease    Carpal tunnel syndrome of left wrist    Change of, skin texture    Chronic midline low back pain without sciatica    Coronary vasospasm (HCC)    Derangement of knee    Disorder associated with Helicobacter species    Disturbance of skin sensation    Encounter for surgical follow-up care    Endometriosis    Environmental and seasonal allergies    Glaucoma suspect    History of arterial ischemic stroke    Mixed hyperlipidemia    Large liver    Lesion of ulnar nerve    Nasal congestion    Nicotine dependence    Pancreatic insufficiency    Primary osteoarthritis involving multiple joints    Sciatica    Sprain of right foot    Preoperative state    Type 2 diabetes mellitus without complication, without long-term current use of insulin (HCC)    Urinary incontinence, overflow    Elevated lactic acid level    Acute renal failure with acute cortical necrosis (HCC)    Pyelonephritis of left kidney    Pyelonephritis    Septicemia ([facility]       Plan:     Consent obtained; stage I sacral neurostimulator placement in OR 11/04/2020.     Melia Phillip MD  2:43 PM 11/3/2020

## 2020-11-04 ENCOUNTER — APPOINTMENT (OUTPATIENT)
Dept: GENERAL RADIOLOGY | Age: 65
End: 2020-11-04
Attending: UROLOGY
Payer: MEDICAID

## 2020-11-04 ENCOUNTER — ANESTHESIA EVENT (OUTPATIENT)
Dept: OPERATING ROOM | Age: 65
End: 2020-11-04
Payer: MEDICAID

## 2020-11-04 ENCOUNTER — ANESTHESIA (OUTPATIENT)
Dept: OPERATING ROOM | Age: 65
End: 2020-11-04
Payer: MEDICAID

## 2020-11-04 ENCOUNTER — HOSPITAL ENCOUNTER (OUTPATIENT)
Age: 65
Setting detail: OUTPATIENT SURGERY
Discharge: HOME | End: 2020-11-04
Attending: UROLOGY | Admitting: UROLOGY
Payer: MEDICAID

## 2020-11-04 VITALS
HEIGHT: 64 IN | OXYGEN SATURATION: 97 % | SYSTOLIC BLOOD PRESSURE: 135 MMHG | RESPIRATION RATE: 16 BRPM | HEART RATE: 72 BPM | TEMPERATURE: 97.2 F | BODY MASS INDEX: 25.44 KG/M2 | WEIGHT: 149 LBS | DIASTOLIC BLOOD PRESSURE: 63 MMHG

## 2020-11-04 VITALS — DIASTOLIC BLOOD PRESSURE: 70 MMHG | OXYGEN SATURATION: 99 % | SYSTOLIC BLOOD PRESSURE: 118 MMHG

## 2020-11-04 LAB
AMPHETAMINE+METHAMPHETAMINE URINE SCREEN: NEGATIVE
BARBITURATE QUANTITATIVE URINE: NEGATIVE
BENZODIAZEPINE QUANTITATIVE URINE: NEGATIVE
CANNABINOID QUANTITATIVE URINE: NEGATIVE
COCAINE METABOLITE QUANTITATIVE URINE: NEGATIVE
GLUCOSE BLD-MCNC: 118 MG/DL (ref 70–108)
OPIATES, URINE: NEGATIVE
OXYCODONE: NEGATIVE
PHENCYCLIDINE QUANTITATIVE URINE: NEGATIVE

## 2020-11-04 PROCEDURE — 3700000000 HC ANESTHESIA ATTENDED CARE: Performed by: UROLOGY

## 2020-11-04 PROCEDURE — 7100000010 HC PHASE II RECOVERY - FIRST 15 MIN: Performed by: UROLOGY

## 2020-11-04 PROCEDURE — 3209999900 FLUORO FOR SURGICAL PROCEDURES

## 2020-11-04 PROCEDURE — 82948 REAGENT STRIP/BLOOD GLUCOSE: CPT

## 2020-11-04 PROCEDURE — 7100000011 HC PHASE II RECOVERY - ADDTL 15 MIN: Performed by: UROLOGY

## 2020-11-04 PROCEDURE — 6360000002 HC RX W HCPCS: Performed by: REGISTERED NURSE

## 2020-11-04 PROCEDURE — 64581 OPN IMPLTJ NEA SACRAL NERVE: CPT | Performed by: UROLOGY

## 2020-11-04 PROCEDURE — 6360000002 HC RX W HCPCS: Performed by: UROLOGY

## 2020-11-04 PROCEDURE — 76000 FLUOROSCOPY <1 HR PHYS/QHP: CPT | Performed by: UROLOGY

## 2020-11-04 PROCEDURE — 3600000013 HC SURGERY LEVEL 3 ADDTL 15MIN: Performed by: UROLOGY

## 2020-11-04 PROCEDURE — 2500000003 HC RX 250 WO HCPCS: Performed by: UROLOGY

## 2020-11-04 PROCEDURE — 3700000001 HC ADD 15 MINUTES (ANESTHESIA): Performed by: UROLOGY

## 2020-11-04 PROCEDURE — 3600000003 HC SURGERY LEVEL 3 BASE: Performed by: UROLOGY

## 2020-11-04 PROCEDURE — 72220 X-RAY EXAM SACRUM TAILBONE: CPT

## 2020-11-04 PROCEDURE — 2709999900 HC NON-CHARGEABLE SUPPLY: Performed by: UROLOGY

## 2020-11-04 PROCEDURE — C1883 ADAPT/EXT, PACING/NEURO LEAD: HCPCS | Performed by: UROLOGY

## 2020-11-04 PROCEDURE — C1778 LEAD, NEUROSTIMULATOR: HCPCS | Performed by: UROLOGY

## 2020-11-04 PROCEDURE — 6360000002 HC RX W HCPCS

## 2020-11-04 PROCEDURE — 2580000003 HC RX 258: Performed by: UROLOGY

## 2020-11-04 PROCEDURE — 80307 DRUG TEST PRSMV CHEM ANLYZR: CPT

## 2020-11-04 DEVICE — KIT LEAD SURE SCAN INTERSTIM MRI
Type: IMPLANTABLE DEVICE | Site: BACK | Status: NON-FUNCTIONAL
Removed: 2022-04-14

## 2020-11-04 DEVICE — EXTENSION LD PERC INTRSTM SCAN SURE MRI: Type: IMPLANTABLE DEVICE | Status: FUNCTIONAL

## 2020-11-04 RX ORDER — HYDROCODONE BITARTRATE AND ACETAMINOPHEN 5; 325 MG/1; MG/1
1 TABLET ORAL EVERY 4 HOURS PRN
Qty: 30 TABLET | Refills: 0 | Status: SHIPPED | OUTPATIENT
Start: 2020-11-04 | End: 2020-11-09

## 2020-11-04 RX ORDER — FENTANYL CITRATE 50 UG/ML
INJECTION, SOLUTION INTRAMUSCULAR; INTRAVENOUS PRN
Status: DISCONTINUED | OUTPATIENT
Start: 2020-11-04 | End: 2020-11-04 | Stop reason: SDUPTHER

## 2020-11-04 RX ORDER — BUPIVACAINE HYDROCHLORIDE 5 MG/ML
INJECTION, SOLUTION EPIDURAL; INTRACAUDAL PRN
Status: DISCONTINUED | OUTPATIENT
Start: 2020-11-04 | End: 2020-11-04 | Stop reason: ALTCHOICE

## 2020-11-04 RX ORDER — MIDAZOLAM HYDROCHLORIDE 1 MG/ML
INJECTION INTRAMUSCULAR; INTRAVENOUS PRN
Status: DISCONTINUED | OUTPATIENT
Start: 2020-11-04 | End: 2020-11-04 | Stop reason: SDUPTHER

## 2020-11-04 RX ORDER — SODIUM CHLORIDE 9 MG/ML
INJECTION, SOLUTION INTRAVENOUS CONTINUOUS
Status: DISCONTINUED | OUTPATIENT
Start: 2020-11-04 | End: 2020-11-04 | Stop reason: HOSPADM

## 2020-11-04 RX ORDER — PROPOFOL 10 MG/ML
INJECTION, EMULSION INTRAVENOUS CONTINUOUS PRN
Status: DISCONTINUED | OUTPATIENT
Start: 2020-11-04 | End: 2020-11-04 | Stop reason: SDUPTHER

## 2020-11-04 RX ORDER — KETOROLAC TROMETHAMINE 30 MG/ML
INJECTION, SOLUTION INTRAMUSCULAR; INTRAVENOUS
Status: COMPLETED
Start: 2020-11-04 | End: 2020-11-04

## 2020-11-04 RX ORDER — KETOROLAC TROMETHAMINE 30 MG/ML
15 INJECTION, SOLUTION INTRAMUSCULAR; INTRAVENOUS ONCE
Status: COMPLETED | OUTPATIENT
Start: 2020-11-04 | End: 2020-11-04

## 2020-11-04 RX ADMIN — PROPOFOL 75 MCG/KG/MIN: 10 INJECTION, EMULSION INTRAVENOUS at 13:18

## 2020-11-04 RX ADMIN — CEFAZOLIN 2 G: 10 INJECTION, POWDER, FOR SOLUTION INTRAVENOUS at 13:20

## 2020-11-04 RX ADMIN — SODIUM CHLORIDE: 9 INJECTION, SOLUTION INTRAVENOUS at 10:24

## 2020-11-04 RX ADMIN — KETOROLAC TROMETHAMINE 15 MG: 30 INJECTION, SOLUTION INTRAMUSCULAR; INTRAVENOUS at 15:09

## 2020-11-04 RX ADMIN — MIDAZOLAM HYDROCHLORIDE 2 MG: 1 INJECTION, SOLUTION INTRAMUSCULAR; INTRAVENOUS at 13:12

## 2020-11-04 RX ADMIN — FENTANYL CITRATE 100 MCG: 50 INJECTION, SOLUTION INTRAMUSCULAR; INTRAVENOUS at 13:12

## 2020-11-04 ASSESSMENT — PULMONARY FUNCTION TESTS
PIF_VALUE: 0
PIF_VALUE: 1
PIF_VALUE: 0

## 2020-11-04 ASSESSMENT — PAIN SCALES - GENERAL
PAINLEVEL_OUTOF10: 4
PAINLEVEL_OUTOF10: 8
PAINLEVEL_OUTOF10: 9
PAINLEVEL_OUTOF10: 9

## 2020-11-04 ASSESSMENT — PAIN - FUNCTIONAL ASSESSMENT: PAIN_FUNCTIONAL_ASSESSMENT: 0-10

## 2020-11-04 ASSESSMENT — PAIN DESCRIPTION - DESCRIPTORS: DESCRIPTORS: CONSTANT

## 2020-11-04 NOTE — ANESTHESIA POSTPROCEDURE EVALUATION
Department of Anesthesiology  Postprocedure Note    Patient: Salina Savage  MRN: [id number]  YOB: 1955  Date of evaluation: 11/4/2020  Time:  5:49 PM     Procedure Summary     Date:  11/04/20 Room / Location:  [address]    Anesthesia Start:  2352 Anesthesia Stop:  1774    Procedure:  STAGE 1 INTERSTIM PLACEMENT (N/A Back) Diagnosis:  (Luis Nightingale)    Surgeon:  Annette Green MD Responsible Provider:  Gabriela Melgar MD    Anesthesia Type:  general ASA Status:  4          Anesthesia Type: general    Merline Phase I: Merline Score: 10    Merline Phase II: Merline Score: 10    Last vitals: Reviewed and per EMR flowsheets. Anesthesia Post Evaluation    Patient location during evaluation: PACU  Patient participation: complete - patient participated  Level of consciousness: awake and alert  Airway patency: patent  Nausea & Vomiting: no nausea and no vomiting  Complications: no  Cardiovascular status: hemodynamically stable  Respiratory status: acceptable  Hydration status: euvolemic      [facility]  POST-ANESTHESIA NOTE       Name:  Salina Savage                                         Age:  63 y.o.   MRN:  [id number]      Last Vitals:  /63   Pulse 72   Temp 97.2 °F (36.2 °C) (Temporal)   Resp 16   Ht 5' 4\" (1.626 m)   Wt 149 lb (67.6 kg)   SpO2 97%   BMI 25.58 kg/m²   Patient Vitals for the past 4 hrs:   BP Temp Temp src Pulse Resp SpO2   11/04/20 1542 135/63 -- -- 72 16 97 %   11/04/20 1508 114/68 -- -- 71 16 98 %   11/04/20 1426 (!) 97/58 97.2 °F (36.2 °C) Temporal 69 16 95 %       Level of Consciousness:  Awake    Respiratory:  Stable    Oxygen Saturation:  Stable    Cardiovascular:  Stable    Hydration:  Adequate    PONV:  Stable    Post-op Pain:  Adequate analgesia    Post-op Assessment:  No apparent anesthetic complications    Additional Follow-Up / Treatment / Comment:  None    Earlene Alonso MD  November 4, 2020   5:49 PM

## 2020-11-04 NOTE — ANESTHESIA PRE PROCEDURE
mLs by nebulization every 6 hours as needed for Wheezing 8/26/20   BA Barillas CNP   acetaminophen (MAPAP) 325 MG tablet Take 650 mg by mouth every 6 hours as needed for Pain    Historical Provider, MD   albuterol sulfate HFA (VENTOLIN HFA) 108 (90 Base) MCG/ACT inhaler Inhale 2 puffs into the lungs every 6 hours as needed for Wheezing 12/26/19   BA Cardoso CNP   nystatin (MYCOSTATIN) POWD powder Apply 1 each topically 2 times daily    Historical Provider, MD       Current medications:    Current Facility-Administered Medications   Medication Dose Route Frequency Provider Last Rate Last Dose    0.9 % sodium chloride infusion   Intravenous Continuous Miko Cruz  mL/hr at 11/04/20 1024      ceFAZolin (ANCEF) 2 g in dextrose 5 % 50 mL IVPB  2 g Intravenous 969 Hector Haro MD           Allergies:     Allergies   Allergen Reactions    Seasonal Other (See Comments)     sneezing       Problem List:    Patient Active Problem List   Diagnosis Code    GERD (gastroesophageal reflux disease) K21.9    Colon polyps K63.5    Chest pain, atypical R07.89    Gastroenteritis K52.9    Pneumonia J18.9    Shoulder pain M25.519    History of pulmonary embolism Z86.711    COPD (chronic obstructive pulmonary disease) ([facility]) J44.9    Hypoglycemia E16.2    Anticoagulated on Coumadin Z79.01    Bipolar disorder ([facility]) F31.9    Cannabis abuse F12.10    Cocaine abuse ([facility] F14.10    Alcohol dependence in remission ([facility] F10.21    Cholecystitis with cholelithiasis K80.10    GI bleed K92.2    Erosive esophagitis K22.10    Hypokalemia E87.6    HTN (hypertension), benign I10    Bipolar 1 disorder ([facility]) F31.9    Chronic pain G89.29    Hiatal hernia K44.9    Chest pain R07.9    Vomiting R11.10    Erosive gastritis K29.60    H pylori ulcer K27.9, B96.81    Hematemesis K92.0    History of Helicobacter pylori infection Z86.19    Intractable abdominal pain R10.9    Intractable vomiting with nausea R11.2    Epigastric abdominal pain R10.13    Acute blood loss anemia D62    Chronic chest pain R07.9, G89.29    Hyponatremia P52.5    Metabolic acidosis R12.6    Essential hypertension I10    COPD with acute exacerbation ([facility]) J44.1    Hypothyroidism E03.9    History of DVT (deep vein thrombosis) Z86.718    Depression F32.9    Tobacco abuse Z72.0    Hematemesis with nausea K92.0    Hypoxia R09.02    Pleural effusion, bilateral J90    Suicidal ideation R45.851    Bipolar disorder, in partial remission, most recent episode depressed ([facility]) F31.75    [facility]ipolar II disorder [facility] M94.65    Diastolic dysfunction B87.05    Angina, class II ([facility]) I20.9    Unstable angina ([facility]) I20.0    Dyslipidemia E78.5    Electrolyte abnormality E87.8    COPD exacerbation ([facility]) J44.1    Acute respiratory insufficiency R06.89    Chronic diastolic heart failure ([facility]) I50.32    Tobacco use disorder F17.200    Nasal septal deviation J34.2    Hypertrophy of left inferior nasal turbinate J34.3    Rhinogenic headache R51.9    Asymmetrical sensorineural hearing loss H90.5    Tinnitus, left ear H93.12    Psychosis ([facility]) F29    Substance-induced psychotic disorder ([facility]) F19.959    Bipolar 1 disorder, depressed ([facility]) F31.9    Polysubstance abuse ([facility] F19.10    Major depressive disorder, recurrent ([facility] F33.9    Stroke-like symptom R29.90    Right arm weakness R29.898    Delirium R41.0    Paresthesias R20.2    Depression, major, recurrent ([facility]) F33.9    Depression with suicidal ideation F32.9, R45.851    Amnesia R41.3    Bipolar disorder, current episode mixed, moderate ([facility]) F31.62    Btvndzpsu-Focwepo-Szixtq disease M22.40    Carpal tunnel syndrome of left wrist G56.02    Change of, skin texture R23.4    Chronic midline low back pain without sciatica M54.5, G89.29    Coronary vasospasm ([facility]) I20.1    Derangement of knee M23.90    Disorder associated with Helicobacter species B96.81    Disturbance of skin sensation R20.9    Encounter for surgical follow-up care Z48.89    Endometriosis N80.9    Environmental and seasonal allergies J30.89    Glaucoma suspect H40.009    History of arterial ischemic stroke Z86.73    Mixed hyperlipidemia E78.2    Large liver R16.0    Lesion of ulnar nerve G56.20    Nasal congestion R09.81    Nicotine dependence F17.200    Pancreatic insufficiency K86.89    Primary osteoarthritis involving multiple joints M89.49    Sciatica M54.30    Sprain of right foot S93.601A    Preoperative state [id number]    Type 2 diabetes mellitus without complication, without long-term current use of insulin (HCC) E11.9    Urinary incontinence, overflow N39.490    Elevated lactic acid level R79.89    Acute renal failure with acute cortical necrosis (HCC) N17.1    Pyelonephritis of left kidney N12    Pyelonephritis N12    Septicemia (HCC) A41.9       Past Medical History:        Diagnosis Date    Acute renal failure with acute cortical necrosis (HCC) 12/21/2019    Allergic rhinitis     Arthritis     spine    Bipolar 1 disorder (HCC)     B[facility] disorder ([facility]     Blood circulation, collateral     Cancer ([facility] 2011    cancerous polyps removed - Dr. Kim Saenz Colon polyps     COPD (chronic obstructive pulmonary disease) ([facility] 7/24/2014    Coronary vasospasm (HCC) 8/17/2019    Depression     Diverticulitis of colon     GERD (gastroesophageal reflux disease)     Hiatal hernia     History of arterial ischemic stroke 7/20/2019    History of colonoscopy 2002    History of kidney stones     Hx of blood clots 6/17/2014    PE and collar bone area after shoulder surgery    Hyperlipidemia     Hypertension     Liver disease     enlarged liver - damaged with alcohol in past but no cirrhosis per patient    Pneumonia 7/24/2014    Suicidal thoughts     2015 admitted to [facility] from [facility]    Thyroid disease     Type 2 diabetes mellitus without complication, without long-term current use of insulin ([address] 7/20/2019    Vomiting     Wears dentures     Wears glasses        Past Surgical History:        Procedure Laterality Date    ABDOMEN SURGERY      x4 removed cysts and ovary removed    CARDIAC SURGERY      heart caths, no stents    CARPAL TUNNEL RELEASE Bilateral 2016    CHOLECYSTECTOMY, LAPAROSCOPIC  6/12/15    Dr. Crystal Hinkle N/A 7/13/2020    CYSTOSCOPY WITH BLADDER BIOPSIES performed by Lui Chirinos MD at [address], [address] Right 10/7/2004    Dr. Anthony Ulloa Bilateral 2016    decompression   [address]    Dr. Yanet Nathan TORITO with [address]    ROTATOR CUFF REPAIR  2004, 2014    right shoulder    SHOULDER SURGERY  2014    right    SKIN BIOPSY  2006    under left eye       Social History:    Social History     Tobacco Use    Smoking status: Current Every Day Smoker     Packs/day: 1.00     Years: 30.00     Pack years: 30.00     Types: Cigarettes     Start date: 4/22/1977    Smokeless tobacco: Never Used   Substance Use Topics    Alcohol use: No     Comment: none for 2 years                                Ready to quit: Not Answered  Counseling given: Not Answered      Vital Signs (Current):   Vitals:    11/04/20 0920 11/04/20 0937   BP: 125/61    Pulse: 67    Temp: 97.6 °F (36.4 °C)    SpO2: 97%    Weight:  149 lb (67.6 kg)   Height:  5' 4\" (1.626 m)                                              BP Readings from Last 3 Encounters:   11/04/20 125/61   10/31/20 127/76   10/09/20 (!) 102/56       NPO Status: Time of last liquid consumption: 2200                        Time of last solid consumption: 2200                        Date of last liquid consumption: 11/03/20                        Date of last solid food consumption: 11/03/20    BMI:   Wt Readings from Last 3 Encounters:   11/04/20 149 lb (67.6 kg)   10/09/20 149 lb (67.6 kg)   09/01/20 146 lb 12.8 oz (66.6 kg)     Body mass index is 25.58 kg/m².     CBC:   Lab Results   Component Value Date    WBC 7.8 10/31/2020    RBC 4.40 10/31/2020    RBC 4.41 04/19/2012    HGB 12.6 10/31/2020    HCT 39.5 10/31/2020    MCV 89.8 10/31/2020    RDW 14.5 12/12/2019     10/31/2020       CMP:   Lab Results   Component Value Date     10/31/2020    K 4.5 10/31/2020     10/31/2020    CO2 22 10/31/2020    BUN 11 10/31/2020    CREATININE 0.8 10/31/2020    GFRAA >60 02/25/2019    LABGLOM 72 10/31/2020    GLUCOSE 111 10/31/2020    GLUCOSE 155 12/12/2019    PROT 6.8 10/31/2020    CALCIUM 10.7 10/31/2020    BILITOT 0.2 10/31/2020    ALKPHOS 58 10/31/2020    AST 20 10/31/2020    ALT 24 10/31/2020       POC Tests:   Recent Labs     11/04/20  1023   POCGLU 118*       Coags:   Lab Results   Component Value Date    PROTIME 14.6 12/12/2019    INR 1.08 05/17/2020    APTT 39.0 12/21/2019       HCG (If Applicable): No results found for: PREGTESTUR, PREGSERUM, HCG, HCGQUANT     ABGs: No results found for: PHART, PO2ART, IXY2WTO, NKQ1DVM, BEART, X6ZYPAWK     Type & Screen (If Applicable):  Lab Results   Component Value Date    LABRH POS 11/17/2017       Drug/Infectious Status (If Applicable):  Lab Results   Component Value Date    HEPCAB Negative 10/12/2015       COVID-19 Screening (If Applicable):   Lab Results   Component Value Date    COVID19 Not Detected 10/28/2020         Anesthesia Evaluation    Airway: Mallampati: II  TM distance: >3 FB   Neck ROM: full  Mouth opening: > = 3 FB Dental:          Pulmonary:   (+) COPD:  decreased breath sounds,                             Cardiovascular:    (+) hypertension:, CAD:,         Rhythm: regular                      Neuro/Psych:   (+) CVA:, headaches:,             GI/Hepatic/Renal:   (+) hiatal hernia, GERD:, liver disease:,           Endo/Other:    (+) Diabetes, hypothyroidism::., .                 Abdominal: Vascular:                                        Anesthesia Plan      general     ASA 4       Induction: intravenous. MIPS: Postoperative opioids intended and Prophylactic antiemetics administered. Anesthetic plan and risks discussed with patient. Plan discussed with CRNA.                   Abby Hutson MD   11/4/2020

## 2020-11-04 NOTE — BRIEF OP NOTE
Brief Postoperative Note      Patient: Sharad Tolbert  YOB: 1955  MRN: [id number]    Date of Procedure: 11/4/2020    Pre-Op Diagnosis: URGE INCONTINENCE    Post-Op Diagnosis: Same       Procedure(s):  STAGE 1 INTERSTIM PLACEMENT    Surgeon(s):  Valeria Lim MD    Assistant:layton      Anesthesia: general    Estimated Blood Loss (mL): Minimal    Complications: None    Specimens:   * No specimens in log *    Implants:  Implant Name Type Inv.  Item Serial No.  Lot No. LRB No. Used Action   EXTENSION LD PERC INTRSTM SCAN SURE MRI Spine:Stimulator EXTENSION LD PERC INTRSTM SCAN SURE MRI  [employer] [id number] N/A 1 Implanted   KIT LEAD SURE SCAN INTERSTIM MRI Spine:Stimulator KIT LEAD SURE SCAN INTERSTIM MRI  [employer] [id number] N/A 1 Implanted         Drains: * No LDAs found *    Findings: Good position of stage I sacral neurostimulator    Electronically signed by Jerri Vilchis MD on 11/4/2020 at 2:07 PM

## 2020-11-04 NOTE — PROGRESS NOTES
Dr. Ailyn Gerardo contacted regarding patients pain and dressing that needs changed in mid back. Orders given for pain medication. States that layton from surgery can come change dressing due to having wires exposed on back. Surgery contacted and states they will be over to change dressing.

## 2020-11-04 NOTE — PROGRESS NOTES
ADMITTED TO [facility] AND ORIENTED TO UNIT. SCDS ON. FALL BAND ON. PT VERBALIZED APPROVAL FOR FIRST NAME, LAST INITIAL AND PHYSICIAN NAME ON UNIT WHITEBOARD. Sister, Bailey with the patient.

## 2020-11-04 NOTE — PROGRESS NOTES
Abelardo Fairbanks from surgery in room to do dressing change. States that incision has significant drainage. Dr. Kamini Kennedy contacted.

## 2020-11-05 NOTE — OP NOTE
[address] [id number]                                OPERATIVE REPORT    PATIENT NAME: Deja Robertson                  :        1955  MED REC NO:   [id number]                           ROOM:  ACCOUNT NO:   [de-identified]                           ADMIT DATE: 2020  PROVIDER:     [facility], M.D.    DATE OF PROCEDURE:  2020    PREOPERATIVE DIAGNOSES:  Urgency, frequency, urge incontinence. POSTOPERATIVE DIAGNOSES:  Urgency, frequency, urge incontinence. OPERATION PERFORMED:  Placement of stage I sacral nerve stimulator. SURGEON:  [facility], MD    ANESTHESIA:  MAC and local.    INDICATIONS:  The patient is a 61-year-old white female with severe  lower urinary tract symptoms, not controlled with conservative measures. She is now at the point where she needs a level-3 option for her  uncontrolled voiding problems. She has elected to try a sacral nerve  stimulator. PNE was unsuccessful in the office, however, the leads were  pulled prematurely. As such she now comes in for definitive stage I  sacral nerve stimulator placement. She understands the procedures,  success rate, common side effects, potential complications and followup. OPERATIVE PROCEDURE:  After informed consent was signed and the patient  properly identified, the patient was taken to the operating room, placed  on the operating room table in the prone position where she was given  monitored anesthetic care. Once she was heavily sedated, the low lumbar  and sacral areas were prepped and draped in the usual sterile manner. Fluoroscopy was positioned over the patient. The position of needle  placement was marked out using the plastic covering for the needle. Appropriate horizontal and vertical lines were then placed and marked. Fluoroscopy confirmed proper markings.   I then injected 1% plain  lidocaine into the areas of needle placement. Once the areas were  anesthetized, I advanced the standard needle at a 60-degree angle  towards the S3 foramen. I fortunately found the foramen on the right  side very expeditiously. The needle was placed at proper depth based on  fluoroscopy. Testing was then performed and excellent toe curl and anal  sphincter contraction was noted. As such, the introducer of the needle  was removed and the long stiff guidewire was placed at the appropriate  depth inside the S3 foramen. The needle was removed. Over this stiff  guidewire, a fascial dilator was placed at the appropriate depth. The  introducer of the dilator was removed as well as the guidewire, and  through the fascial dilator the lead was positioned in a typical lead  pointing laterally. Proper depth of the lead was placed and then  testing was done. Positions of the lead at position 0, 1, 2, and 3.  0,  1, and 2 gave excellent anal sphincter contractions and toe curl. With  live fluoroscopy, the lead was held in place and the fascial dilator was  removed carefully. Next, a 0.5% bupivacaine was injected from the exit  point percutaneously from the lead down to the right aspect of the  gluteus/buttocks. An approximate 7 cm incision was made over the right  buttocks with a #15 blade. Bovie was used for hemostasis and incising  the subcutaneous tissue. A nice pocket was created. A tunneling device  was then placed from the lead exit site subcutaneously to the incision I  just mentioned. The tunneling device was removed with the plastic  sheath in place. The lead was placed through the plastic sheath and  exited to the right incision. The lead was then connected to  appropriate extension wiring. This was then secured with the special  Manuel Jake wrench and tightened with two clicks of the Floyd Early device.    Further 0.5% bupivacaine was then injected subcutaneously from the  incision slightly superior to the previous tunnel and just lateral to  the midline. A 15-blade was used to make a small nick for the tunneling  device and this was brought up percutaneously. The extension wiring was  then placed through the plastic sheath once again and brought up  percutaneously just to the left of the midline. The right buttock  incision was closed with two layers of 3-0 Vicryl and Monocryl for the  skin in simple interrupted suture fashion. Appropriate dressings were  placed above and below the extension wiring and stabilized with  Tegaderm. A spot film AP and lateral showed excellent positioning of  the lead through the S3 foramen in the appropriate depth. This  completed the procedures. The patient tolerated the procedures well. No complications were apparent. Minimal blood loss was noted. The  patient returned to her room.         Fátima Benz M.D.    D: 11/05/2020 11:03:41       T: 11/05/2020 11:49:33     [email or web address]  Job#: [id number]     Doc#: [id number]    CC:

## 2020-11-09 ENCOUNTER — HOSPITAL ENCOUNTER (EMERGENCY)
Age: 65
Discharge: HOME | End: 2020-11-10
Attending: EMERGENCY MEDICINE
Payer: MEDICAID

## 2020-11-09 LAB
BACTERIA: ABNORMAL /HPF
BILIRUBIN URINE: NEGATIVE
BLOOD, URINE: NEGATIVE
CASTS 2: ABNORMAL /LPF
CASTS UA: ABNORMAL /LPF
CHARACTER, URINE: ABNORMAL
COLOR: YELLOW
CRYSTALS, UA: ABNORMAL
EKG ATRIAL RATE: 55 BPM
EKG P AXIS: 64 DEGREES
EKG P-R INTERVAL: 144 MS
EKG Q-T INTERVAL: 460 MS
EKG QRS DURATION: 84 MS
EKG QTC CALCULATION (BAZETT): 440 MS
EKG R AXIS: 10 DEGREES
EKG T AXIS: 54 DEGREES
EKG VENTRICULAR RATE: 55 BPM
EPITHELIAL CELLS, UA: ABNORMAL /HPF
GLUCOSE URINE: NEGATIVE MG/DL
KETONES, URINE: NEGATIVE
LEUKOCYTE ESTERASE, URINE: ABNORMAL
MISCELLANEOUS 2: ABNORMAL
NITRITE, URINE: NEGATIVE
PH UA: 6.5 (ref 5–9)
PROTEIN UA: NEGATIVE
RBC URINE: ABNORMAL /HPF
RENAL EPITHELIAL, UA: ABNORMAL
SPECIFIC GRAVITY, URINE: < 1.005 (ref 1–1.03)
UROBILINOGEN, URINE: 0.2 EU/DL (ref 0–1)
WBC UA: ABNORMAL /HPF
YEAST: ABNORMAL

## 2020-11-09 PROCEDURE — 87186 SC STD MICRODIL/AGAR DIL: CPT

## 2020-11-09 PROCEDURE — 96372 THER/PROPH/DIAG INJ SC/IM: CPT

## 2020-11-09 PROCEDURE — 99285 EMERGENCY DEPT VISIT HI MDM: CPT

## 2020-11-09 PROCEDURE — 84484 ASSAY OF TROPONIN QUANT: CPT

## 2020-11-09 PROCEDURE — 93005 ELECTROCARDIOGRAM TRACING: CPT

## 2020-11-09 PROCEDURE — G0480 DRUG TEST DEF 1-7 CLASSES: HCPCS

## 2020-11-09 PROCEDURE — 84443 ASSAY THYROID STIM HORMONE: CPT

## 2020-11-09 PROCEDURE — 85025 COMPLETE CBC W/AUTO DIFF WBC: CPT

## 2020-11-09 PROCEDURE — 83735 ASSAY OF MAGNESIUM: CPT

## 2020-11-09 PROCEDURE — 93005 ELECTROCARDIOGRAM TRACING: CPT | Performed by: EMERGENCY MEDICINE

## 2020-11-09 PROCEDURE — 81001 URINALYSIS AUTO W/SCOPE: CPT

## 2020-11-09 PROCEDURE — 6360000002 HC RX W HCPCS: Performed by: EMERGENCY MEDICINE

## 2020-11-09 PROCEDURE — 87086 URINE CULTURE/COLONY COUNT: CPT

## 2020-11-09 PROCEDURE — 80048 BASIC METABOLIC PNL TOTAL CA: CPT

## 2020-11-09 PROCEDURE — 87077 CULTURE AEROBIC IDENTIFY: CPT

## 2020-11-09 PROCEDURE — 36415 COLL VENOUS BLD VENIPUNCTURE: CPT

## 2020-11-09 PROCEDURE — 80307 DRUG TEST PRSMV CHEM ANLYZR: CPT

## 2020-11-09 RX ORDER — METHYLPREDNISOLONE SODIUM SUCCINATE 125 MG/2ML
60 INJECTION, POWDER, LYOPHILIZED, FOR SOLUTION INTRAMUSCULAR; INTRAVENOUS ONCE
Status: COMPLETED | OUTPATIENT
Start: 2020-11-09 | End: 2020-11-09

## 2020-11-09 RX ORDER — KETOROLAC TROMETHAMINE 30 MG/ML
30 INJECTION, SOLUTION INTRAMUSCULAR; INTRAVENOUS ONCE
Status: COMPLETED | OUTPATIENT
Start: 2020-11-09 | End: 2020-11-09

## 2020-11-09 RX ORDER — ORPHENADRINE CITRATE 30 MG/ML
60 INJECTION INTRAMUSCULAR; INTRAVENOUS ONCE
Status: COMPLETED | OUTPATIENT
Start: 2020-11-09 | End: 2020-11-09

## 2020-11-09 RX ADMIN — KETOROLAC TROMETHAMINE 30 MG: 30 INJECTION, SOLUTION INTRAMUSCULAR; INTRAVENOUS at 23:01

## 2020-11-09 RX ADMIN — METHYLPREDNISOLONE SODIUM SUCCINATE 60 MG: 125 INJECTION, POWDER, FOR SOLUTION INTRAMUSCULAR; INTRAVENOUS at 23:00

## 2020-11-09 RX ADMIN — ORPHENADRINE CITRATE 60 MG: 30 INJECTION INTRAMUSCULAR; INTRAVENOUS at 23:01

## 2020-11-09 ASSESSMENT — PAIN DESCRIPTION - PAIN TYPE
TYPE: ACUTE PAIN

## 2020-11-09 ASSESSMENT — PAIN DESCRIPTION - LOCATION
LOCATION: ABDOMEN;BACK
LOCATION: BACK
LOCATION: ABDOMEN;BACK
LOCATION: ABDOMEN;BACK

## 2020-11-09 ASSESSMENT — PAIN SCALES - GENERAL
PAINLEVEL_OUTOF10: 10

## 2020-11-09 ASSESSMENT — PAIN DESCRIPTION - ONSET
ONSET: ON-GOING

## 2020-11-09 ASSESSMENT — PAIN DESCRIPTION - FREQUENCY
FREQUENCY: CONTINUOUS

## 2020-11-09 ASSESSMENT — PAIN DESCRIPTION - DESCRIPTORS
DESCRIPTORS: CONSTANT

## 2020-11-09 ASSESSMENT — PAIN DESCRIPTION - PROGRESSION
CLINICAL_PROGRESSION: NOT CHANGED

## 2020-11-09 ASSESSMENT — PAIN DESCRIPTION - ORIENTATION
ORIENTATION: LOWER

## 2020-11-10 VITALS
OXYGEN SATURATION: 98 % | BODY MASS INDEX: 25.58 KG/M2 | WEIGHT: 149 LBS | DIASTOLIC BLOOD PRESSURE: 72 MMHG | HEART RATE: 67 BPM | TEMPERATURE: 98.2 F | SYSTOLIC BLOOD PRESSURE: 128 MMHG | RESPIRATION RATE: 18 BRPM

## 2020-11-10 LAB
AMPHETAMINE+METHAMPHETAMINE URINE SCREEN: NEGATIVE
ANION GAP SERPL CALCULATED.3IONS-SCNC: 12 MEQ/L (ref 8–16)
BARBITURATE QUANTITATIVE URINE: NEGATIVE
BASOPHILS # BLD: 0.6 %
BASOPHILS ABSOLUTE: 0 THOU/MM3 (ref 0–0.1)
BENZODIAZEPINE QUANTITATIVE URINE: NEGATIVE
BUN BLDV-MCNC: 7 MG/DL (ref 7–22)
CALCIUM SERPL-MCNC: 10.1 MG/DL (ref 8.5–10.5)
CANNABINOID QUANTITATIVE URINE: NEGATIVE
CHLORIDE BLD-SCNC: 104 MEQ/L (ref 98–111)
CO2: 20 MEQ/L (ref 23–33)
COCAINE METABOLITE QUANTITATIVE URINE: POSITIVE
CREAT SERPL-MCNC: 0.6 MG/DL (ref 0.4–1.2)
EOSINOPHIL # BLD: 3.3 %
EOSINOPHILS ABSOLUTE: 0.2 THOU/MM3 (ref 0–0.4)
ERYTHROCYTE [DISTWIDTH] IN BLOOD BY AUTOMATED COUNT: 13.3 % (ref 11.5–14.5)
ERYTHROCYTE [DISTWIDTH] IN BLOOD BY AUTOMATED COUNT: 43.1 FL (ref 35–45)
ETHYL ALCOHOL, SERUM: < 0.01 %
GFR SERPL CREATININE-BSD FRML MDRD: > 90 ML/MIN/1.73M2
GLUCOSE BLD-MCNC: 113 MG/DL (ref 70–108)
HCT VFR BLD CALC: 35.2 % (ref 37–47)
HEMOGLOBIN: 11.2 GM/DL (ref 12–16)
IMMATURE GRANS (ABS): 0.03 THOU/MM3 (ref 0–0.07)
IMMATURE GRANULOCYTES: 0.5 %
LYMPHOCYTES # BLD: 51.4 %
LYMPHOCYTES ABSOLUTE: 3.3 THOU/MM3 (ref 1–4.8)
MAGNESIUM: 1.7 MG/DL (ref 1.6–2.4)
MCH RBC QN AUTO: 28.1 PG (ref 26–33)
MCHC RBC AUTO-ENTMCNC: 31.8 GM/DL (ref 32.2–35.5)
MCV RBC AUTO: 88.4 FL (ref 81–99)
MONOCYTES # BLD: 6.7 %
MONOCYTES ABSOLUTE: 0.4 THOU/MM3 (ref 0.4–1.3)
NUCLEATED RED BLOOD CELLS: 0 /100 WBC
OPIATES, URINE: NEGATIVE
OSMOLALITY CALCULATION: 270.7 MOSMOL/KG (ref 275–300)
OXYCODONE: NEGATIVE
PHENCYCLIDINE QUANTITATIVE URINE: NEGATIVE
PLATELET # BLD: 310 THOU/MM3 (ref 130–400)
PMV BLD AUTO: 9.8 FL (ref 9.4–12.4)
POTASSIUM SERPL-SCNC: 4 MEQ/L (ref 3.5–5.2)
RBC # BLD: 3.98 MILL/MM3 (ref 4.2–5.4)
SEG NEUTROPHILS: 37.5 %
SEGMENTED NEUTROPHILS ABSOLUTE COUNT: 2.4 THOU/MM3 (ref 1.8–7.7)
SODIUM BLD-SCNC: 136 MEQ/L (ref 135–145)
TROPONIN T: < 0.01 NG/ML
TSH SERPL DL<=0.05 MIU/L-ACNC: 5.14 UIU/ML (ref 0.4–4.2)
WBC # BLD: 6.5 THOU/MM3 (ref 4.8–10.8)

## 2020-11-10 PROCEDURE — 93010 ELECTROCARDIOGRAM REPORT: CPT | Performed by: NUCLEAR MEDICINE

## 2020-11-10 PROCEDURE — 93005 ELECTROCARDIOGRAM TRACING: CPT

## 2020-11-10 RX ORDER — CYCLOBENZAPRINE HCL 10 MG
10 TABLET ORAL 3 TIMES DAILY PRN
Qty: 21 TABLET | Refills: 0 | Status: SHIPPED | OUTPATIENT
Start: 2020-11-10 | End: 2020-11-20

## 2020-11-10 RX ORDER — IBUPROFEN 600 MG/1
600 TABLET ORAL EVERY 6 HOURS PRN
Qty: 40 TABLET | Refills: 0 | Status: SHIPPED | OUTPATIENT
Start: 2020-11-10 | End: 2021-05-06 | Stop reason: SDUPTHER

## 2020-11-10 ASSESSMENT — ENCOUNTER SYMPTOMS
CONSTIPATION: 0
ABDOMINAL DISTENTION: 0
BLOOD IN STOOL: 0
BACK PAIN: 1
EYE PAIN: 0
EYE REDNESS: 0
ABDOMINAL PAIN: 0
SINUS PRESSURE: 0
EYE DISCHARGE: 0
COUGH: 0
CHEST TIGHTNESS: 0
VOICE CHANGE: 0
SHORTNESS OF BREATH: 0
WHEEZING: 0
TROUBLE SWALLOWING: 0
EYE ITCHING: 0
CHOKING: 0
VOMITING: 0
SORE THROAT: 0
RHINORRHEA: 0
NAUSEA: 0
PHOTOPHOBIA: 0
DIARRHEA: 0

## 2020-11-10 ASSESSMENT — PAIN DESCRIPTION - FREQUENCY: FREQUENCY: CONTINUOUS

## 2020-11-10 ASSESSMENT — PAIN DESCRIPTION - PROGRESSION: CLINICAL_PROGRESSION: NOT CHANGED

## 2020-11-10 ASSESSMENT — PAIN DESCRIPTION - PAIN TYPE: TYPE: ACUTE PAIN

## 2020-11-10 ASSESSMENT — PAIN DESCRIPTION - DESCRIPTORS: DESCRIPTORS: CONSTANT

## 2020-11-10 ASSESSMENT — PAIN DESCRIPTION - ORIENTATION: ORIENTATION: LOWER

## 2020-11-10 ASSESSMENT — PAIN DESCRIPTION - ONSET: ONSET: ON-GOING

## 2020-11-10 ASSESSMENT — PAIN SCALES - GENERAL: PAINLEVEL_OUTOF10: 8

## 2020-11-10 ASSESSMENT — PAIN DESCRIPTION - LOCATION: LOCATION: ABDOMEN;BACK

## 2020-11-10 NOTE — ED NOTES
Pt resting quietly in room no needs expressed. Side rails up x2 with call light in reach. Will continue to monitor.        Nathanael [facility]  11/09/20 [id number]

## 2020-11-10 NOTE — PROGRESS NOTES
Checked in on patient. Resting. Patient reports use of crack cocaine. Patient does report withdrawal symptoms at this time. Patient reports previous detox from [facility]. Patient denies follow up outpatient for mental health treatment. Provided patient with resources for outpatient treatment and inpatient detox per provider request. Patient receptive to plan.

## 2020-11-10 NOTE — ED TRIAGE NOTES
Pt presents to ED via triage for c/o back pain and wanting detox from cocaine. Pt states she had a bladder stimulator placed on 2020 by Dr. Ruthie Phoenix. Surgery went well with no complications. Pt states the batteries have  in the stimulator and can't take the pain. Pt states in order to control the pain, she has been using crack cocaine with last use 4hrs ago. Pt denies taking anything else for pain at this time.

## 2020-11-10 NOTE — ED PROVIDER NOTES
[facility]  eMERGENCY dEPARTMENT eNCOUnter          CHIEF COMPLAINT       Chief Complaint   Patient presents with    Other     Issues with bladder stimulator    Addiction Problem     Cocaine       Nurses Notes reviewed and I agree except as noted in the HPI. HISTORY OF PRESENT ILLNESS    Ever Mccall is a 63 y.o. female who presents for addiction problem. Patient states that she has problems with crack cocaine. As she is come here for help. FRANKY has been consulted. Patient states she did not do any other drugs or alcohol. Patient states she has chronic back pain. She has not been using her Norco even though she has a prescription for it. She states it is because of the crack cocaine. Currently the patient is resting comfortably on the cot no apparent distress. Behavioral health has been consulted. REVIEW OF SYSTEMS     Review of Systems   Constitutional: Negative for activity change, appetite change, diaphoresis, fatigue and unexpected weight change. HENT: Negative for congestion, ear discharge, ear pain, hearing loss, rhinorrhea, sinus pressure, sore throat, trouble swallowing and voice change. Eyes: Negative for photophobia, pain, discharge, redness and itching. Respiratory: Negative for cough, choking, chest tightness, shortness of breath and wheezing. Cardiovascular: Negative for chest pain, palpitations and leg swelling. Gastrointestinal: Negative for abdominal distention, abdominal pain, blood in stool, constipation, diarrhea, nausea and vomiting. Endocrine: Negative for polydipsia, polyphagia and polyuria. Genitourinary: Negative for decreased urine volume, difficulty urinating, dysuria, enuresis, frequency, hematuria and urgency. Musculoskeletal: Positive for back pain, gait problem and myalgias. Negative for arthralgias, neck pain and neck stiffness. Skin: Negative for pallor and rash. Allergic/Immunologic: Negative for immunocompromised state. Neurological: Negative for dizziness, tremors, seizures, syncope, facial asymmetry, weakness, light-headedness, numbness and headaches. Hematological: Negative for adenopathy. Does not bruise/bleed easily. Psychiatric/Behavioral: Positive for sleep disturbance. Negative for agitation, hallucinations, self-injury and suicidal ideas. The patient is nervous/anxious. PAST MEDICAL HISTORY    has a past medical history of Acute renal failure with acute cortical necrosis ([address] Allergic rhinitis, Arthritis, Bipolar 1 disorder ([address] Bipolar disorder ([address] Blood circulation, collateral, Cancer ([address] Colon polyps, COPD (chronic obstructive pulmonary disease) ([address] Coronary vasospasm (HCC), Depression, Diverticulitis of colon, GERD (gastroesophageal reflux disease), Hiatal hernia, History of arterial ischemic stroke, History of colonoscopy, History of kidney stones, Hx of blood clots, Hyperlipidemia, Hypertension, Liver disease, Pneumonia, Suicidal thoughts, Thyroid disease, Type 2 diabetes mellitus without complication, without long-term current use of insulin ([address] Vomiting, Wears dentures, and Wears glasses. SURGICAL HISTORY      has a past surgical history that includes Mandible fracture surgery (1984); shoulder surgery (2014); Colonoscopy (2011); Dilatation, esophagus; Elbow surgery (Right, 10/7/2004); Rotator cuff repair (2004, 2014); skin biopsy (2006); Cholecystectomy, laparoscopic (6/12/15); Elbow surgery (Bilateral, 2016); Carpal tunnel release (Bilateral, 2016); Hysterectomy (1998); Cardiac surgery; Abdomen surgery; cystoscopy w biopsy of bladder (N/A, 7/13/2020); and Stimulator Surgery (N/A, 11/4/2020).     CURRENT MEDICATIONS       Discharge Medication List as of 11/10/2020 12:58 AM      CONTINUE these medications which have NOT CHANGED    Details   Respiratory Therapy Supplies (NEBULIZER/TUBING/MOUTHPIECE) KIT EVERY 6 HOURS PRN Starting Wed 8/26/2020, Until Thu 8/26/2021, For 365 days, Disp-1 kit,R-1, Print      albuterol (PROVENTIL) (2.5 MG/3ML) 0.083% nebulizer solution Take 3 mLs by nebulization every 6 hours as needed for Wheezing, Disp-120 each,R-0Normal      Roflumilast (DALIRESP) 500 MCG tablet Take 500 mcg by mouth dailyHistorical Med      Tiotropium Bromide Monohydrate (SPIRIVA HANDIHALER IN) Inhale 2 puffs into the lungs dailyHistorical Med      Calcium Carbonate 500 MG CHEW Take by mouth 2 tabs as neededHistorical Med      acetaminophen (MAPAP) 325 MG tablet Take 650 mg by mouth every 6 hours as needed for PainHistorical Med      potassium chloride (KLOR-CON M) 20 MEQ extended release tablet Take 40 mEq by mouth daily Historical Med      metFORMIN (GLUCOPHAGE) 1000 MG tablet Take 1,000 mg by mouth 2 times daily (with meals)Historical Med      topiramate (TOPAMAX) 50 MG tablet Take 1 tablet by mouth 2 times daily, Disp-60 tablet, R-3Normal      hydroCHLOROthiazide (HYDRODIURIL) 25 MG tablet TAKE 1 TABLET BY MOUTH TWICE DAILY, Disp-60 tablet, R-1Normal      Pancrelipase, Lip-Prot-Amyl, (ZENPEP) 96914-052943 units CPEP 2 caps 8/12/1600 1 cap at HS, Disp-120 capsule, R-0Print      niacin 250 MG extended release capsule Take 2 capsules by mouth nightly, Disp-30 capsule, R-0NO PRINT      levothyroxine (SYNTHROID) 75 MCG tablet Take 1 tablet by mouth daily everyday except Sunday take 150 mcg., Disp-30 tablet, R-0NO PRINT      fluticasone (FLONASE) 50 MCG/ACT nasal spray 1 spray by Each Nostril route 2 times daily, Disp-1 Bottle, R-0NO PRINT      ferrous sulfate 325 (65 Fe) MG tablet Take 1 tablet by mouth 2 times daily, Disp-30 tablet, O-3SR PRINT      folic acid (FOLVITE) 1 MG tablet Take 1 mg by mouth dailyHistorical Med      ARIPiprazole (ABILIFY) 2 MG tablet Take 2 mg by mouth dailyHistorical Med      atorvastatin (LIPITOR) 40 MG tablet Take 40 mg by mouth nightly Historical Med      !! escitalopram (LEXAPRO) 20 MG tablet Take 20 mg by mouth dailyHistorical Med      !! escitalopram Abdominal:      General: Bowel sounds are normal. There is no distension. Palpations: Abdomen is soft. There is no mass. Tenderness: There is no abdominal tenderness. There is no guarding or rebound. Hernia: No hernia is present. Musculoskeletal: Normal range of motion. General: No tenderness. Lymphadenopathy:      Cervical: No cervical adenopathy. Skin:     General: Skin is warm and dry. Findings: No bruising, ecchymosis, lesion or rash. Neurological:      General: No focal deficit present. Mental Status: She is alert and oriented to person, place, and time. Cranial Nerves: No cranial nerve deficit. Sensory: No sensory deficit. Motor: No weakness. Coordination: Coordination normal.      Gait: Gait normal.      Deep Tendon Reflexes: Reflexes are normal and symmetric. Reflexes normal.   Psychiatric:         Mood and Affect: Mood normal.         Speech: Speech normal.         Behavior: Behavior normal.         Thought Content:  Thought content normal.         Judgment: Judgment normal.           DIFFERENTIAL DIAGNOSIS:   Differential diagnosis discussed extensively at bedside with the patient including but not limited to cocaine abuse, polysubstance abuse, chronic back pain    DIAGNOSTIC RESULTS     EKG: All EKG's are interpreted by the Emergency Department Physician who either signs or Co-signs this chart in the absence of a cardiologist.  None    RADIOLOGY: non-plain film images(s) such as CT, Ultrasound and MRI are read by the radiologist.  None    LABS:   Labs Reviewed   CBC WITH AUTO DIFFERENTIAL - Abnormal; Notable for the following components:       Result Value    RBC 3.98 (*)     Hemoglobin 11.2 (*)     Hematocrit 35.2 (*)     MCHC 31.8 (*)     All other components within normal limits   BASIC METABOLIC PANEL - Abnormal; Notable for the following components:    CO2 20 (*)     Glucose 113 (*)     All other components within normal limits   URINE WITH REFLEXED MICRO - Abnormal; Notable for the following components:    Leukocyte Esterase, Urine MODERATE (*)     Character, Urine CLOUDY (*)     All other components within normal limits   TSH WITHOUT REFLEX - Abnormal; Notable for the following components:    TSH 5.140 (*)     All other components within normal limits   OSMOLALITY - Abnormal; Notable for the following components:    Osmolality Calc 270.7 (*)     All other components within normal limits   CULTURE, REFLEXED, URINE    Narrative:     Source: urine, clean catch       Site: clean void          Current Antibiotics: not stated   MAGNESIUM   TROPONIN   ETHANOL   URINE DRUG SCREEN   ANION GAP   GLOMERULAR FILTRATION RATE, ESTIMATED       EMERGENCY DEPARTMENT COURSE:   Vitals:    Vitals:    11/09/20 2106 11/09/20 2210 11/09/20 2331 11/10/20 0026   BP: 129/76 126/80 130/72 128/72   Pulse: 63 53 62 67   Resp: 18 17 15 18   Temp: 98.2 °F (36.8 °C)      TempSrc: Oral      SpO2: 97% 98% 99% 98%   Weight: 149 lb (67.6 kg)        Patient was assessed at bedside appropriate labs were ordered. Patient was given Toradol Norflex and Solu-Medrol shot for her chronic back pain. Behavioral health was at bedside consulting the patient and providing resources. I reviewed all labs and imaging went back to reassess the patient. She had improvement of her symptoms. At this point the patient can be safely discharged home. She is instructed to follow-up with resources as provided by behavioral health. She is instructed to follow-up with the pain management doctor as provided by myself. She has been given anti-inflammatories and muscle relaxers at home to augment her current prescription for Norco.  Patient understood and agreed with the plan. Patient is subsequently discharged home in good condition. Patient is here for cocaine abuse. Patient has been provided with resources by behavioral health she is instructed to call them in follow-up.   Patient also has chronic low back pain issues. She has been provided with a pain management physician she is instructed to follow-up with them and call for an appointment within the next 1 to 2 days. Patient has Hermon Pelt at home she is instructed to use that for any pain or problems. She has been provided with NSAIDs and muscle relaxers to help her with this. Patient is instructed to return to the nearest emergency room immediately for any new or worsening complaints. CRITICAL CARE:   None    CONSULTS:  Behavioral health    PROCEDURES:  None    FINAL IMPRESSION      1. Nondependent cocaine abuse ([facility]    2.  Acute exacerbation of chronic low back pain          DISPOSITION/PLAN   Discharge    PATIENT REFERRED TO:  Resources as provided by behavioral health    Call in 2 days      Iona Officer, MD  [address] [facility] [address] [phone]    Call in 1 day  For chronic pain management    [address]  Call in 2 days  For further evaluation by primary care      DISCHARGE MEDICATIONS:  Discharge Medication List as of 11/10/2020 12:58 AM      START taking these medications    Details   cyclobenzaprine (FLEXERIL) 10 MG tablet Take 1 tablet by mouth 3 times daily as needed for Muscle spasms, Disp-21 tablet,R-0Print             (Please note that portions of this note were completed with a voice recognition program.  Efforts were made to edit the dictations but occasionally words are mis-transcribed.)    Adarsh Mcgregor, [facility], DO  11/10/20 2528

## 2020-11-11 LAB
ORGANISM: ABNORMAL
URINE CULTURE REFLEX: ABNORMAL
URINE CULTURE REFLEX: ABNORMAL

## 2020-11-14 ENCOUNTER — TELEPHONE (OUTPATIENT)
Dept: UROLOGY | Age: 65
End: 2020-11-14

## 2020-11-14 NOTE — PROGRESS NOTES
[facility], we need to call Colin Cleveland and see if she is ready to do stage II sacral nerve stimulator

## 2020-11-14 NOTE — PROGRESS NOTES
Pharmacy Note  ED Culture Follow-up    Nicole Pritchard is a 63 y.o. female. Allergies: Seasonal     Labs:  Lab Results   Component Value Date    BUN 7 11/09/2020    CREATININE 0.6 11/09/2020    WBC 6.5 11/09/2020     CrCl cannot be calculated (Unknown ideal weight.). Current antimicrobials:   None    ASSESSMENT:  Micro results:   Urine culture: positive for Proteus mirabilis (<10k)     PLAN:  Need for intervention: No 2/2 lack of UTI symptoms in ED. Discussed with: Milagro Stewart PA-C  Chosen treatment:    No treatment indicated    Patient response:   No need to contact patient    Called/sent in prescription to: Not applicable    Please call with any questions.  Chaya Gottron, PharmD 12:15 PM 11/14/2020

## 2020-11-16 ENCOUNTER — OFFICE VISIT (OUTPATIENT)
Dept: UROLOGY | Age: 65
End: 2020-11-16

## 2020-11-16 ENCOUNTER — TELEPHONE (OUTPATIENT)
Dept: UROLOGY | Age: 65
End: 2020-11-16

## 2020-11-16 ENCOUNTER — HOSPITAL ENCOUNTER (OUTPATIENT)
Age: 65
Setting detail: SPECIMEN
Discharge: HOME | End: 2020-11-16
Payer: MEDICAID

## 2020-11-16 ENCOUNTER — TELEPHONE (OUTPATIENT)
Dept: NEUROSURGERY | Age: 65
End: 2020-11-16

## 2020-11-16 VITALS — TEMPERATURE: 97 F | HEIGHT: 64 IN | WEIGHT: 149 LBS | BODY MASS INDEX: 25.44 KG/M2

## 2020-11-16 PROCEDURE — 99024 POSTOP FOLLOW-UP VISIT: CPT | Performed by: UROLOGY

## 2020-11-16 PROCEDURE — U0003 INFECTIOUS AGENT DETECTION BY NUCLEIC ACID (DNA OR RNA); SEVERE ACUTE RESPIRATORY SYNDROME CORONAVIRUS 2 (SARS-COV-2) (CORONAVIRUS DISEASE [COVID-19]), AMPLIFIED PROBE TECHNIQUE, MAKING USE OF HIGH THROUGHPUT TECHNOLOGIES AS DESCRIBED BY CMS-2020-01-R: HCPCS

## 2020-11-16 RX ORDER — HYDROCODONE BITARTRATE AND ACETAMINOPHEN 5; 325 MG/1; MG/1
1 TABLET ORAL EVERY 4 HOURS PRN
Qty: 42 TABLET | Refills: 0 | Status: SHIPPED | OUTPATIENT
Start: 2020-11-16 | End: 2020-11-23

## 2020-11-16 RX ORDER — ONDANSETRON 4 MG/1
4 TABLET, ORALLY DISINTEGRATING ORAL EVERY 8 HOURS PRN
Qty: 21 TABLET | Refills: 1 | Status: SHIPPED | OUTPATIENT
Start: 2020-11-16 | End: 2020-11-30

## 2020-11-16 NOTE — PROGRESS NOTES
Patient returns today for examination. She is having some postop incisional pain, as expected. She has not kept her diary as she was asked to do. Exam: The incision over the right buttocks is clean dry and intact with no erythema  The skin site for the percutaneous lead extension is also clean dry and intact. New dressings were applied to both areas. She was told she has to keep a voiding diary for the next few days or we will not be able to do the stage II sacral neurostimulator. Also, if she does not improve by 50% we cannot do the stage II sacral neurostimulator. Nevertheless, we will put her on the schedule for 11/23/2020.

## 2020-11-16 NOTE — TELEPHONE ENCOUNTER
Patient scheduled for surgery with Dr. Johnny Bardales on 11- at 7:30am with an arrival of 6:00am.  Preop orders and instructions given to patient. Surgery consent signed. Wilfred from [employer] notified.

## 2020-11-16 NOTE — TELEPHONE ENCOUNTER
Patient informed. She is meeting Wilfred at the hospital? Patient reminded to call and speak with wilfred, she says she put batteries in remote.

## 2020-11-16 NOTE — TELEPHONE ENCOUNTER
She needs to talk to [facility] and decide if the testing is helped her or not.   Her battery is not dead, because we did not place a battery

## 2020-11-16 NOTE — TELEPHONE ENCOUNTER
DO NOT TAKE ASPIRIN, IBUPROFEN, MOTRIN-LIKE DRUGS AND ANY MULTIVITAMINS OR OVER THE COUNTER SUPPLEMENTS 5 DAYS PRIOR TO SURGERY AND 3 DAYS FOLLOWING     Miguel Brooke 1955 Diagnosis: Urge incontinence    Surgical Physician: Dr. Mateo Umaña have been scheduled for the procedure marked below:      Surgery: Placement of stage II InterStim           Date: 11-     Anesthesia: Anesthesiologist (General/Spinal)     Place of Service: [facility] Second Floor Same Day Surgery       Arrive to same day surgery by:  6:00am   (Surgery time is subject to change)      INSTRUCTIONS AS MARKED BELOW:    1. DO NOT eat or drink anything after midnight before surgery. 2. We prefer you shower or bathe with an antibacterial soap (Dial) the morning of surgery. 3.  Please ensure to have a  with you to transport you home. 4.  Please bring a current medication list, photo ID and insurance card(s) with you  5. Okay to take Tylenol  6. If you take Glucophage, Metformin or Janumet, hold 48-hours prior to surgery  7. Take blood pressure or heart medication as directed, if taken in the morning take with a small sip of water  8. The office will call you in 1-2 days after your procedure to schedule a follow up.       (Covid-19 screening is date sensitive and can only be done 5 to 7 days prior to your procedure)    Date: 11/16/2020

## 2020-11-16 NOTE — TELEPHONE ENCOUNTER
Pt called stating Dr Tru Melgar inserted something in her back and it is not working, thinks batteries are bad.       Also asking for more Norco.

## 2020-11-16 NOTE — TELEPHONE ENCOUNTER
Patient has interstim. She believes the batteries are dead. She is going to call [address] [place] [address]. Patient is having pain where wires for her interstim are. She rates her pain at a 8-9. Patients interstim was placed on 11-04-20. Patient is asking for more Norco for pain. Please advise. Also when would you like patient to follow up?

## 2020-11-16 NOTE — TELEPHONE ENCOUNTER
SURGERY [address]  [facility] [address] [facility] [address] [place], [address]      Phone *[phone] *[phone]   Surgical Scheduling Direct Line Phone *[phone] Fax *[phone]      Sabrina Oakes 1955 female    [address] [facility] [address]   Marital Status:          Home Phone: [phone]      Cell Phone:    Telephone Information:   Mobile [phone]          Surgeon: Dr. Chelsie Ferrer  Surgery Date: 11-   Time: 7:30am    Procedure: Placement of Stage II InterStim    Diagnosis: Urge urinary incontinence     Important Medical History: In [facility]    Special Inst/Equip: Regular Room   Wilfred from [employer] notified. CPT Codes:    75427  Latex Allergy:  No     Cardiac Device:  No     Anesthesia:  MAC          Admission Type:  Same Day                             Admit Prior to Day of Surgery: No    Case Location:  Main OR           Preadmission Testing:Phone Call              PAT Date and Time:______________________________________________________    PAT Confirmation #: ______________________________________________________    Post Op Visit: ___________________________________________________________    Need Preop Cardiac Clearance: Yes    Does Patient have Cardiologist/physician?      Dr. Tata Stoll cleared    Surgery Confirmation #: __________________________________________________    Breann Tanoba: ________________________   Date: __________________________     [employer] Name: Medicaid

## 2020-11-17 ENCOUNTER — TELEPHONE (OUTPATIENT)
Dept: UROLOGY | Age: 65
End: 2020-11-17

## 2020-11-17 ENCOUNTER — PREP FOR PROCEDURE (OUTPATIENT)
Dept: UROLOGY | Age: 65
End: 2020-11-17

## 2020-11-17 RX ORDER — SODIUM CHLORIDE 9 MG/ML
INJECTION, SOLUTION INTRAVENOUS CONTINUOUS
Status: CANCELLED | OUTPATIENT
Start: 2020-11-23

## 2020-11-17 NOTE — TELEPHONE ENCOUNTER
[address] [facility] Urology Surgery Preop Check Off    Preop testing- done 2 weeks prior and covid testing 1 week prior to surgery date. Continue to give arrival times and inform patients time is subject to change that day as it gets closer to the day of surgery.     PREOP check list      Surgeon Dr Stepan Olvera   Patient Name   Jhon GUTIERREZ  1955  MRN   [id number]    DOS    20  Diagnosis/Indication for Surgery  Urinary Incontinence   Procedure Stage 2  Interstim    Allergies   Seasonal  UA  20       Leukocyte- Moderate , Character - Cloudy  Urine Culture  None  Blood thinners  No     EKG. 5/3/20  Abnormal  CXR- 20  Normal    COVID  20   Not Detected    Clearance:  Cardiac- Dr Giovanna Harris cleared for 20 stage 1  Medical -       Pre-Admission to surgery- No

## 2020-11-19 LAB — SARS-COV-2: NOT DETECTED

## 2020-11-20 NOTE — PROGRESS NOTES
Patient contacted regarding COVID-19 screen. Following questions asked: In the last month, have you been in contact with someone who was confirmed or suspected to have Coronavirus/COVID-19:  Patient stated NO      Pt was informed can be a visitor allowed. Please bring masks. Do you or family members have any of the following symptoms:  Cough-no   Muscle pain-no   Shortness of breath-no   Fever-no   Weakness-no  Severe headache-no   Sore throat-no   Respiratory symptoms-no    Have you traveled internationally in the last month?  No     Have you been to the emergency room recently-couple weeks ago

## 2020-11-22 NOTE — H&P
Higinio Carmona MD  History and Physical    Patient:  Samantha Diaz  MRN: [id number]  YOB: 1955    HISTORY OF PRESENT ILLNESS:     The patient is a 63 y.o. female who presents with urge incontinence. Here for procedure. Patient's old records, notes and chart reviewed and summarized above. Higinio Carmona MD independently reviewed the images and verified the radiology reports from:    No results found.       Past Medical History:    Past Medical History:   Diagnosis Date    Acute renal failure with acute cortical necrosis (HCC) 12/21/2019    Allergic rhinitis     Arthritis     spine    Bipolar 1 disorder (HCC)     Bipolar disorder ([address]     Blood circulation, collateral     Cancer ([address] 2011    cancerous polyps removed - Dr. Jaquan Fuentes Colon polyps     COPD (chronic obstructive pulmonary disease) ([address] 7/24/2014    Coronary vasospasm ([address] 8/17/2019    Depression     Diverticulitis of colon     GERD (gastroesophageal reflux disease)     Hiatal hernia     History of arterial ischemic stroke 7/20/2019    History of colonoscopy 2002    History of kidney stones     Hx of blood clots 6/17/2014    PE and collar bone area after shoulder surgery    Hyperlipidemia     Hypertension     Liver disease     enlarged liver - damaged with alcohol in past but no cirrhosis per patient    Pneumonia 7/24/2014    Suicidal thoughts     2015 admitted to  from [facility]    Thyroid disease     Type 2 diabetes mellitus without complication, without long-term current use of insulin ([address] 7/20/2019    Vomiting     Wears dentures     Wears glasses        Past Surgical History:    Past Surgical History:   Procedure Laterality Date    ABDOMEN SURGERY      x4 removed cysts and ovary removed    CARDIAC SURGERY      heart caths, no stents    CARPAL TUNNEL RELEASE Bilateral 2016    CHOLECYSTECTOMY, LAPAROSCOPIC  6/12/15    Dr. Cipriano Youngblood    COLONOSCOPY  2011   [address] N/A 7/13/2020    CYSTOSCOPY WITH BLADDER BIOPSIES performed by Elayne Ruiz MD at [address], [address] Right 10/7/2004    Dr. Ayush Jin Bilateral 2016    decompression   [address]    Dr. Keena Chatman -[facility]    ROTATOR CUFF REPAIR  2004, 2014    right shoulder    SHOULDER SURGERY  2014    right    SKIN BIOPSY  2006    under left eye    STIMULATOR SURGERY N/A 11/4/2020    STAGE 1 INTERSTIM PLACEMENT performed by Elayne Ruiz MD at [facility]       Medications Prior to Admission:    Prior to Admission medications    Medication Sig Start Date End Date Taking? Authorizing Provider   HYDROcodone-acetaminophen (NORCO) 5-325 MG per tablet Take 1 tablet by mouth every 4 hours as needed for Pain for up to 7 days. Intended supply: 3 days.  Take lowest dose possible to manage pain 11/16/20 11/23/20  Elayne Ruiz MD   ondansetron (ZOFRAN ODT) 4 MG disintegrating tablet Take 1 tablet by mouth every 8 hours as needed for Nausea or Vomiting 11/16/20 11/30/20  Elayne Ruiz MD   ibuprofen (IBU) 600 MG tablet Take 1 tablet by mouth every 6 hours as needed for Pain 11/10/20 11/20/20  Nathan Mcclendon DO   Respiratory Therapy Supplies (NEBULIZER/TUBING/MOUTHPIECE) KIT 1 kit by Does not apply route every 6 hours as needed (shortness of breath) 8/26/20 8/26/21  BA Worley CNP   albuterol (PROVENTIL) (2.5 MG/3ML) 0.083% nebulizer solution Take 3 mLs by nebulization every 6 hours as needed for Wheezing 8/26/20   BA Worley CNP   Roflumilast (DALIRESP) 500 MCG tablet Take 500 mcg by mouth daily    Historical Provider, MD   Tiotropium Bromide Monohydrate (SPIRIVA HANDIHALER IN) Inhale 2 puffs into the lungs daily    Historical Provider, MD   Calcium Carbonate 500 MG CHEW Take by mouth 2 tabs as needed    Historical Provider, MD   acetaminophen (MAPAP) 325 MG tablet Take 650 mg by mouth every 6 hours as needed for Pain    Historical Provider, MD   potassium chloride (KLOR-CON M) 20 MEQ extended release tablet Take 40 mEq by mouth daily  5/20/20   Historical Provider, MD   metFORMIN (GLUCOPHAGE) 1000 MG tablet Take 1,000 mg by mouth 2 times daily (with meals)    Historical Provider, MD   topiramate (TOPAMAX) 50 MG tablet Take 1 tablet by mouth 2 times daily 4/21/20   BA Francis CNP   hydroCHLOROthiazide (HYDRODIURIL) 25 MG tablet TAKE 1 TABLET BY MOUTH TWICE DAILY  Patient taking differently: Take 25 mg by mouth daily  4/20/20   Yvette Mccarthy MD   Pancrelipase, Lip-Prot-Amyl, (ZENPEP) 84317-272858 units CPEP 2 caps 8/12/1600 1 cap at [facility] 12/26/19   BA Diaz CNP   niacin 250 MG extended release capsule Take 2 capsules by mouth nightly  Patient taking differently: Take 500 mg by mouth nightly Currently on 750mg tablet at bedtime 12/26/19 12/25/20  BA Ovalles CNP   levothyroxine (SYNTHROID) 75 MCG tablet Take 1 tablet by mouth daily everyday except Sunday take 150 mcg. 12/26/19   BA Ovalles CNP   fluticasone (FLONASE) 50 MCG/ACT nasal spray 1 spray by Each Nostril route 2 times daily 12/26/19   BA Ovalles CNP   ferrous sulfate 325 (65 Fe) MG tablet Take 1 tablet by mouth 2 times daily 12/26/19   BA Ovalles CNP   folic acid (FOLVITE) 1 MG tablet Take 1 mg by mouth daily    Historical Provider, MD   ARIPiprazole (ABILIFY) 2 MG tablet Take 2 mg by mouth daily    Historical Provider, MD   atorvastatin (LIPITOR) 40 MG tablet Take 40 mg by mouth nightly     Historical Provider, MD   escitalopram (LEXAPRO) 20 MG tablet Take 20 mg by mouth daily    Historical Provider, MD   escitalopram (LEXAPRO) 10 MG tablet Take 10 mg by mouth daily    Historical Provider, MD   fenofibrate 160 MG tablet Take 145 mg by mouth daily     Historical Provider, MD   nystatin (MYCOSTATIN) POWD powder Apply 1 each topically 2 times daily per session: Not on file    Stress: Not on file   Relationships    Social connections     Talks on phone: Not on file     Gets together: Not on file     Attends Yazidism service: Not on file     Active member of club or organization: Not on file     Attends meetings of clubs or organizations: Not on file     Relationship status: Not on file    Intimate partner violence     Fear of current or ex partner: Not on file     Emotionally abused: Not on file     Physically abused: Not on file     Forced sexual activity: Not on file   Other Topics Concern    Not on file   Social History Narrative    Not on file       Family History:    Family History   Problem Relation Age of Onset    Cancer Mother     Heart Disease Mother     High Blood Pressure Mother     High Cholesterol Mother     Cancer Father         brain    Depression Father     Heart Disease Father     High Cholesterol Father     Mental Illness Father     Cancer Sister     Heart Attack Sister     Heart Disease Maternal Uncle     High Cholesterol Maternal Uncle     Diabetes Maternal Grandmother     Heart Disease Maternal Grandmother     High Cholesterol Maternal Grandmother     Early Death Maternal Grandfather     Heart Disease Maternal Grandfather     High Cholesterol Maternal Grandfather     Stroke Maternal Grandfather     Heart Disease Paternal Grandmother     High Cholesterol Paternal Grandmother     Heart Disease Paternal Grandfather     High Cholesterol Paternal Grandfather        REVIEW OF SYSTEMS:  Constitutional: negative  Eyes: negative  Respiratory: negative  Cardiovascular: negative  Gastrointestinal: negative  Genitourinary: no acute issues  Musculoskeletal: negative  Skin: negative   Neurological: negative  Hematological/Lymphatic: negative  Psychological: negative    Physical Exam:      No data found. Constitutional: Patient in no acute distress; Neuro: alert and oriented to person place and time.     Psych: Mood and affect normal.  Skin: Normal  Lungs: Respiratory effort normal, CTA  Cardiovascular:  Normal peripheral pulses; no murmur. Normal rhythm  Abdomen: Soft, non-tender, non-distended with no CVA, flank pain, hepatosplenomegaly or hernia. Kidneys normal.  Bladder non-tender and not distended. LABS:   No results for input(s): WBC, HGB, HCT, MCV, PLT in the last 72 hours. No results for input(s): NA, K, CL, CO2, PHOS, BUN, CREATININE in the last 72 hours. Invalid input(s): CA  No results found for: PSA      Urinalysis: No results for input(s): COLORU, PHUR, LABCAST, WBCUA, RBCUA, MUCUS, TRICHOMONAS, YEAST, BACTERIA, CLARITYU, SPECGRAV, LEUKOCYTESUR, UROBILINOGEN, Tala Slimmer in the last 72 hours.     Invalid input(s): NITRATE, GLUCOSEUKETONESUAMORPHOUS     -----------------------------------------------------------------      Assessment and Plan     Impression:    Patient Active Problem List   Diagnosis    GERD (gastroesophageal reflux disease)    Colon polyps    Chest pain, atypical    Gastroenteritis    Pneumonia    Shoulder pain    History of pulmonary embolism    [facility]    Hypoglycemia    Anticoagulated on Coumadin    Bipolar disorder ([facility]    Cannabis abuse    Cocaine abuse ([facility]    Alcohol dependence in remission ([facility]    Cholecystitis with cholelithiasis    GI bleed    Erosive esophagitis    Hypokalemia    HTN (hypertension), benign    Bipolar 1 disorder (HCC)    Chronic pain    Hiatal hernia    Chest pain    Vomiting    Erosive gastritis    H pylori ulcer    Hematemesis    History of Helicobacter pylori infection    Intractable abdominal pain    Intractable vomiting with nausea    Epigastric abdominal pain    Acute blood loss anemia    Chronic chest pain    Hyponatremia    Metabolic acidosis    Essential hypertension    COPD with acute exacerbation ([facility]    Hypothyroidism    History of DVT (deep vein thrombosis)    Depression    Tobacco abuse    Hematemesis with nausea    Hypoxia    Pleural effusion, bilateral    Suicidal ideation    Bipolar disorder, in partial remission, most recent episode depressed (HCC)    Bipolar II disorder ([address]    Diastolic dysfunction    Angina, class II (HCC)    Unstable angina (HCC)    Dyslipidemia    Electrolyte abnormality    COPD exacerbation (HCC)    Acute respiratory insufficiency    Chronic diastolic heart failure (HCC)    Tobacco use disorder    Nasal septal deviation    Hypertrophy of left inferior nasal turbinate    Rhinogenic headache    Asymmetrical sensorineural hearing loss    Tinnitus, left ear    Psychosis (HCC)    Substance-induced psychotic disorder (HCC)    Bipolar 1 disorder, depressed ([address]    Polysubstance abuse ([address]    Major depressive disorder, recurrent ([facility]    Stroke-like symptom    Right arm weakness    Delirium    Paresthesias    Depression, major, recurrent ([address]    Depression with suicidal ideation    Amnesia    Bipolar disorder, current episode mixed, moderate ([facility])    Xvssyfbph-[id number]-Mhpomc disease    Carpal tunnel syndrome of left wrist    Change of, skin texture    Chronic midline low back pain without sciatica    Coronary vasospasm ([facility])    Derangement of knee    Disorder associated with Helicobacter species    Disturbance of skin sensation    Encounter for surgical follow-up care    Endometriosis    Environmental and seasonal allergies    Glaucoma suspect    History of arterial ischemic stroke    Mixed hyperlipidemia    Large liver    Lesion of ulnar nerve    Nasal congestion    Nicotine dependence    Pancreatic insufficiency    Primary osteoarthritis involving multiple joints    Sciatica    Sprain of right foot    Preoperative state    Type 2 diabetes mellitus without complication, without long-term current use of insulin ([facility])    Urinary incontinence, overflow    Elevated lactic acid level    Acute renal failure with acute cortical necrosis (HCC)    Pyelonephritis of left kidney    Pyelonephritis    Septicemia ([facility]       Plan:     Consent obtained; placement of stage II SNS in OR 11/23/2020.     Star Ireland MD  3:56 PM 11/22/2020

## 2020-11-23 ENCOUNTER — ANESTHESIA EVENT (OUTPATIENT)
Dept: OPERATING ROOM | Age: 65
End: 2020-11-23
Payer: MEDICAID

## 2020-11-23 ENCOUNTER — ANESTHESIA (OUTPATIENT)
Dept: OPERATING ROOM | Age: 65
End: 2020-11-23
Payer: MEDICAID

## 2020-11-23 ENCOUNTER — HOSPITAL ENCOUNTER (OUTPATIENT)
Age: 65
Setting detail: OUTPATIENT SURGERY
Discharge: HOME | End: 2020-11-23
Attending: UROLOGY | Admitting: UROLOGY
Payer: MEDICAID

## 2020-11-23 VITALS
HEART RATE: 65 BPM | DIASTOLIC BLOOD PRESSURE: 76 MMHG | RESPIRATION RATE: 18 BRPM | HEIGHT: 60 IN | SYSTOLIC BLOOD PRESSURE: 182 MMHG | OXYGEN SATURATION: 96 % | BODY MASS INDEX: 29.57 KG/M2 | WEIGHT: 150.6 LBS | TEMPERATURE: 98.4 F

## 2020-11-23 VITALS — TEMPERATURE: 98.2 F | SYSTOLIC BLOOD PRESSURE: 106 MMHG | DIASTOLIC BLOOD PRESSURE: 54 MMHG | OXYGEN SATURATION: 100 %

## 2020-11-23 LAB
AMPHETAMINE+METHAMPHETAMINE URINE SCREEN: POSITIVE
BARBITURATE QUANTITATIVE URINE: NEGATIVE
BENZODIAZEPINE QUANTITATIVE URINE: NEGATIVE
CANNABINOID QUANTITATIVE URINE: NEGATIVE
COCAINE METABOLITE QUANTITATIVE URINE: POSITIVE
GLUCOSE BLD-MCNC: 105 MG/DL (ref 70–108)
OPIATES, URINE: NEGATIVE
OXYCODONE: NEGATIVE
PHENCYCLIDINE QUANTITATIVE URINE: NEGATIVE

## 2020-11-23 PROCEDURE — 64590 INS/RPL PRPH SAC/GSTR NPG/R: CPT | Performed by: UROLOGY

## 2020-11-23 PROCEDURE — 2580000003 HC RX 258: Performed by: UROLOGY

## 2020-11-23 PROCEDURE — 2709999900 HC NON-CHARGEABLE SUPPLY: Performed by: UROLOGY

## 2020-11-23 PROCEDURE — 2720000010 HC SURG SUPPLY STERILE: Performed by: UROLOGY

## 2020-11-23 PROCEDURE — 3600000012 HC SURGERY LEVEL 2 ADDTL 15MIN: Performed by: UROLOGY

## 2020-11-23 PROCEDURE — 6370000000 HC RX 637 (ALT 250 FOR IP): Performed by: UROLOGY

## 2020-11-23 PROCEDURE — 3700000001 HC ADD 15 MINUTES (ANESTHESIA): Performed by: UROLOGY

## 2020-11-23 PROCEDURE — 95972 ALYS CPLX SP/PN NPGT W/PRGRM: CPT | Performed by: UROLOGY

## 2020-11-23 PROCEDURE — 6360000002 HC RX W HCPCS: Performed by: UROLOGY

## 2020-11-23 PROCEDURE — 7100000011 HC PHASE II RECOVERY - ADDTL 15 MIN: Performed by: UROLOGY

## 2020-11-23 PROCEDURE — 3600000002 HC SURGERY LEVEL 2 BASE: Performed by: UROLOGY

## 2020-11-23 PROCEDURE — 80307 DRUG TEST PRSMV CHEM ANLYZR: CPT

## 2020-11-23 PROCEDURE — 82948 REAGENT STRIP/BLOOD GLUCOSE: CPT

## 2020-11-23 PROCEDURE — 2500000003 HC RX 250 WO HCPCS: Performed by: UROLOGY

## 2020-11-23 PROCEDURE — 7100000010 HC PHASE II RECOVERY - FIRST 15 MIN: Performed by: UROLOGY

## 2020-11-23 PROCEDURE — C1767 GENERATOR, NEURO NON-RECHARG: HCPCS | Performed by: UROLOGY

## 2020-11-23 PROCEDURE — 6360000002 HC RX W HCPCS: Performed by: NURSE ANESTHETIST, CERTIFIED REGISTERED

## 2020-11-23 PROCEDURE — 3700000000 HC ANESTHESIA ATTENDED CARE: Performed by: UROLOGY

## 2020-11-23 DEVICE — Z DUP USE 2628873 GENERATOR NEUROSTIMULATOR H1.7XL2IN THK3IN TORQ WRNCH PROD: Type: IMPLANTABLE DEVICE | Site: BACK | Status: FUNCTIONAL

## 2020-11-23 RX ORDER — FENTANYL CITRATE 50 UG/ML
25 INJECTION, SOLUTION INTRAMUSCULAR; INTRAVENOUS EVERY 5 MIN PRN
Status: DISCONTINUED | OUTPATIENT
Start: 2020-11-23 | End: 2020-11-23 | Stop reason: HOSPADM

## 2020-11-23 RX ORDER — PROMETHAZINE HYDROCHLORIDE 25 MG/ML
12.5 INJECTION, SOLUTION INTRAMUSCULAR; INTRAVENOUS
Status: DISCONTINUED | OUTPATIENT
Start: 2020-11-23 | End: 2020-11-23 | Stop reason: HOSPADM

## 2020-11-23 RX ORDER — PROPOFOL 10 MG/ML
INJECTION, EMULSION INTRAVENOUS CONTINUOUS PRN
Status: DISCONTINUED | OUTPATIENT
Start: 2020-11-23 | End: 2020-11-23 | Stop reason: SDUPTHER

## 2020-11-23 RX ORDER — CLOPIDOGREL BISULFATE 75 MG/1
75 TABLET ORAL DAILY
COMMUNITY

## 2020-11-23 RX ORDER — LIDOCAINE HYDROCHLORIDE AND EPINEPHRINE 10; 10 MG/ML; UG/ML
INJECTION, SOLUTION INFILTRATION; PERINEURAL PRN
Status: DISCONTINUED | OUTPATIENT
Start: 2020-11-23 | End: 2020-11-23 | Stop reason: ALTCHOICE

## 2020-11-23 RX ORDER — FENTANYL CITRATE 50 UG/ML
INJECTION, SOLUTION INTRAMUSCULAR; INTRAVENOUS PRN
Status: DISCONTINUED | OUTPATIENT
Start: 2020-11-23 | End: 2020-11-23 | Stop reason: SDUPTHER

## 2020-11-23 RX ORDER — FAMOTIDINE 20 MG/1
20 TABLET, FILM COATED ORAL 2 TIMES DAILY
Status: ON HOLD | COMMUNITY
End: 2021-02-22 | Stop reason: HOSPADM

## 2020-11-23 RX ORDER — SODIUM CHLORIDE 9 MG/ML
INJECTION, SOLUTION INTRAVENOUS CONTINUOUS
Status: DISCONTINUED | OUTPATIENT
Start: 2020-11-23 | End: 2020-11-23 | Stop reason: HOSPADM

## 2020-11-23 RX ORDER — ASPIRIN 81 MG/1
81 TABLET ORAL DAILY
Status: ON HOLD | COMMUNITY
End: 2021-02-22 | Stop reason: HOSPADM

## 2020-11-23 RX ORDER — PROPOFOL 10 MG/ML
INJECTION, EMULSION INTRAVENOUS PRN
Status: DISCONTINUED | OUTPATIENT
Start: 2020-11-23 | End: 2020-11-23 | Stop reason: SDUPTHER

## 2020-11-23 RX ORDER — MIDAZOLAM HYDROCHLORIDE 1 MG/ML
INJECTION INTRAMUSCULAR; INTRAVENOUS PRN
Status: DISCONTINUED | OUTPATIENT
Start: 2020-11-23 | End: 2020-11-23 | Stop reason: SDUPTHER

## 2020-11-23 RX ORDER — FENTANYL CITRATE 50 UG/ML
50 INJECTION, SOLUTION INTRAMUSCULAR; INTRAVENOUS EVERY 5 MIN PRN
Status: DISCONTINUED | OUTPATIENT
Start: 2020-11-23 | End: 2020-11-23 | Stop reason: HOSPADM

## 2020-11-23 RX ORDER — HYDROCODONE BITARTRATE AND ACETAMINOPHEN 5; 325 MG/1; MG/1
1 TABLET ORAL ONCE
Status: COMPLETED | OUTPATIENT
Start: 2020-11-23 | End: 2020-11-23

## 2020-11-23 RX ORDER — LABETALOL 20 MG/4 ML (5 MG/ML) INTRAVENOUS SYRINGE
10 EVERY 10 MIN PRN
Status: DISCONTINUED | OUTPATIENT
Start: 2020-11-23 | End: 2020-11-23 | Stop reason: HOSPADM

## 2020-11-23 RX ADMIN — FENTANYL CITRATE 50 MCG: 50 INJECTION, SOLUTION INTRAMUSCULAR; INTRAVENOUS at 12:45

## 2020-11-23 RX ADMIN — FENTANYL CITRATE 50 MCG: 50 INJECTION, SOLUTION INTRAMUSCULAR; INTRAVENOUS at 13:07

## 2020-11-23 RX ADMIN — MIDAZOLAM HYDROCHLORIDE 2 MG: 1 INJECTION, SOLUTION INTRAMUSCULAR; INTRAVENOUS at 12:44

## 2020-11-23 RX ADMIN — CEFAZOLIN 2 G: 10 INJECTION, POWDER, FOR SOLUTION INTRAVENOUS at 12:44

## 2020-11-23 RX ADMIN — SODIUM CHLORIDE: 9 INJECTION, SOLUTION INTRAVENOUS at 11:56

## 2020-11-23 RX ADMIN — PROPOFOL 70 MG: 10 INJECTION, EMULSION INTRAVENOUS at 12:45

## 2020-11-23 RX ADMIN — PROPOFOL 40 MG: 10 INJECTION, EMULSION INTRAVENOUS at 13:06

## 2020-11-23 RX ADMIN — PROPOFOL 100 MCG/KG/MIN: 10 INJECTION, EMULSION INTRAVENOUS at 12:45

## 2020-11-23 RX ADMIN — HYDROCODONE BITARTRATE AND ACETAMINOPHEN 1 TABLET: 5; 325 TABLET ORAL at 14:48

## 2020-11-23 ASSESSMENT — PULMONARY FUNCTION TESTS
PIF_VALUE: 1
PIF_VALUE: 0
PIF_VALUE: 1
PIF_VALUE: 0
PIF_VALUE: 1
PIF_VALUE: 0
PIF_VALUE: 1
PIF_VALUE: 0
PIF_VALUE: 1
PIF_VALUE: 0

## 2020-11-23 ASSESSMENT — PAIN SCALES - GENERAL
PAINLEVEL_OUTOF10: 9
PAINLEVEL_OUTOF10: 8
PAINLEVEL_OUTOF10: 9
PAINLEVEL_OUTOF10: 9

## 2020-11-23 NOTE — PROGRESS NOTES
ADMITTED TO SDS AND ORIENTED TO UNIT. SCDS ON. FALL BAND ON. PT VERBALIZED APPROVAL FOR FIRST NAME, LAST INITIAL AND PHYSICIAN NAME ON UNIT WHITEBOARD. Patient to call for a ride home.

## 2020-11-23 NOTE — PROGRESS NOTES
Pt returned to [facility] room 5. Vitals and assessment as charted. 0.9 infusing, IV patent. Pt has [employer]. Call light in reach.

## 2020-11-23 NOTE — ANESTHESIA PRE PROCEDURE
Department of Anesthesiology  Preprocedure Note       Name:  Sabrina Oakes   Age:  63 y.o.  :  1955                                          MRN:  [id number]         Date:  2020      Surgeon: Irwin Major):  Nick Zhou MD    Procedure: Procedure(s):  PLACEMENT OF STAGE II INTERSTIM    Medications prior to admission:   Prior to Admission medications    Medication Sig Start Date End Date Taking?  Authorizing Provider   famotidine (PEPCID) 20 MG tablet Take 20 mg by mouth 2 times daily   Yes Historical Provider, MD   aspirin 81 MG EC tablet Take 81 mg by mouth daily   Yes Historical Provider, MD   clopidogrel (PLAVIX) 75 MG tablet Take 75 mg by mouth daily   Yes Historical Provider, MD   ondansetron (ZOFRAN ODT) 4 MG disintegrating tablet Take 1 tablet by mouth every 8 hours as needed for Nausea or Vomiting 20 Yes Nick Zhou MD   Roflumilast (DALIRESP) 500 MCG tablet Take 500 mcg by mouth daily   Yes Historical Provider, MD   Tiotropium Bromide Monohydrate (SPIRIVA HANDIHALER IN) Inhale 2 puffs into the lungs daily   Yes Historical Provider, MD   potassium chloride (KLOR-CON M) 20 MEQ extended release tablet Take 40 mEq by mouth daily  20  Yes Historical Provider, MD   hydroCHLOROthiazide (HYDRODIURIL) 25 MG tablet TAKE 1 TABLET BY MOUTH TWICE DAILY  Patient taking differently: Take 25 mg by mouth daily  20  Yes William Skinner MD   levothyroxine (SYNTHROID) 75 MCG tablet Take 1 tablet by mouth daily everyday except  take 150 mcg. 19  Yes BA Pimentel CNP   fluticasone (FLONASE) 50 MCG/ACT nasal spray 1 spray by Each Nostril route 2 times daily 19  Yes BA Diaz CNP   ferrous sulfate 325 (65 Fe) MG tablet Take 1 tablet by mouth 2 times daily 19  Yes BA Diaz CNP   folic acid (FOLVITE) 1 MG tablet Take 1 mg by mouth daily   Yes Historical Provider, MD   ARIPiprazole (ABILIFY) 2 MG tablet Take 2 mg by mouth daily   Yes Historical Provider, MD   atorvastatin (LIPITOR) 40 MG tablet Take 40 mg by mouth nightly    Yes Historical Provider, MD   escitalopram (LEXAPRO) 20 MG tablet Take 20 mg by mouth daily   Yes Historical Provider, MD   escitalopram (LEXAPRO) 10 MG tablet Take 10 mg by mouth daily   Yes Historical Provider, MD   fenofibrate 160 MG tablet Take 145 mg by mouth daily    Yes Historical Provider, MD   traZODone (DESYREL) 100 MG tablet Take 2 tablets by mouth nightly  Patient taking differently: Take 150 mg by mouth nightly 2 tabs prn 10/31/18  Yes Giovanna Braswell MD   QUEtiapine (SEROQUEL XR) 300 MG extended release tablet Take 2 tablets by mouth nightly  Patient taking differently: Take 600 mg by mouth nightly  10/31/18  Yes Giovanna Braswell MD   carvedilol (COREG) 3.125 MG tablet TAKE 1 TABLET BY MOUTH 2 TIMES DAILY (WITH MEALS) 9/21/18  Yes Vahe Mckinney PA-C   HYDROcodone-acetaminophen (NORCO) 5-325 MG per tablet Take 1 tablet by mouth every 4 hours as needed for Pain for up to 7 days. Intended supply: 3 days.  Take lowest dose possible to manage pain 11/16/20 11/23/20  Katy Young MD   ibuprofen (IBU) 600 MG tablet Take 1 tablet by mouth every 6 hours as needed for Pain 11/10/20 11/20/20  Sonya Srivastava,    Respiratory Therapy Supplies (NEBULIZER/TUBING/MOUTHPIECE) KIT 1 kit by Does not apply route every 6 hours as needed (shortness of breath) 8/26/20 8/26/21  BA Worley CNP   albuterol (PROVENTIL) (2.5 MG/3ML) 0.083% nebulizer solution Take 3 mLs by nebulization every 6 hours as needed for Wheezing 8/26/20   BA Graff CNP   Calcium Carbonate 500 MG CHEW Take by mouth 2 tabs as needed    Historical Provider, MD   acetaminophen (MAPAP) 325 MG tablet Take 650 mg by mouth every 6 hours as needed for Pain    Historical Provider, MD   metFORMIN (GLUCOPHAGE) 1000 MG tablet Take 1,000 mg by mouth 2 times daily (with meals)    Historical Provider, MD topiramate (TOPAMAX) 50 MG tablet Take 1 tablet by mouth 2 times daily 4/21/20   BA Meadows CNP   Pancrelipase, Lip-Prot-Amyl, (ZENPEP) 68576-004499 units CPEP 2 caps 8/12/1600 1 cap at [facility] 12/26/19   BA Diaz CNP   niacin 250 MG extended release capsule Take 2 capsules by mouth nightly  Patient taking differently: Take 500 mg by mouth nightly Currently on 750mg tablet at bedtime 12/26/19 12/25/20  Lei BA Ta CNP   nystatin (MYCOSTATIN) POWD powder Apply 1 each topically 2 times daily    Historical Provider, MD   hydrOXYzine (VISTARIL) 50 MG capsule Take 50 mg by mouth 3 times daily as needed for Itching or Anxiety     Historical Provider, MD   isosorbide dinitrate (ISORDIL) 10 MG tablet TAKE 1 TABLET BY MOUTH 3 TIMES DAILY 9/21/18   Rere Garcia PA-C       Current medications:    Current Facility-Administered Medications   Medication Dose Route Frequency Provider Last Rate Last Dose    0.9 % sodium chloride infusion   Intravenous Continuous Brett Jauregui MD        ceFAZolin (ANCEF) 2 g in dextrose 5 % 50 mL IVPB  2 g Intravenous [facility] Brett Jauregui MD           Allergies:     Allergies   Allergen Reactions    Seasonal Other (See Comments)     sneezing       Problem List:    Patient Active Problem List   Diagnosis Code    GERD (gastroesophageal reflux disease) K21.9    Colon polyps K63.5    Chest pain, atypical R07.89    Gastroenteritis K52.9    Pneumonia J18.9    Shoulder pain M25.519    History of pulmonary embolism Z86.711    COPD (chronic obstructive pulmonary disease) ([facility] J44.9    Hypoglycemia E16.2    Anticoagulated on Coumadin Z79.01    Bipolar disorder (HCC) F31.9    Cannabis abuse F12.10    Cocaine abuse ([facility] F14.10    Alcohol dependence in remission ([facility] F10.21    Cholecystitis with cholelithiasis K80.10    GI bleed K92.2    Erosive esophagitis K22.10    Hypokalemia E87.6    HTN (hypertension), benign I10    Bipolar 1 disorder ([facility]) F31.9    Chronic pain G89.29    Hiatal hernia K44.9    Chest pain R07.9    Vomiting R11.10    Erosive gastritis K29.60    H pylori ulcer K27.9, B96.81    Hematemesis K92.0    History of Helicobacter pylori infection Z86.19    Intractable abdominal pain R10.9    Intractable vomiting with nausea R11.2    Epigastric abdominal pain R10.13    Acute blood loss anemia D62    Chronic chest pain R07.9, G89.29    Hyponatremia Z52.5    Metabolic acidosis C14.5    Essential hypertension I10    COPD with acute exacerbation ([facility]) J44.1    Hypothyroidism E03.9    History of DVT (deep vein thrombosis) Z86.718    Depression F32.9    Tobacco abuse Z72.0    Hematemesis with nausea K92.0    Hypoxia R09.02    Pleural effusion, bilateral J90    Suicidal ideation R45.851    Bipolar disorder, in partial remission, most recent episode depressed ([facility]) F31.75    Bipolar II disorder ([facility] Y65.35    Diastolic dysfunction N03.57    Angina, class II ([facility]) I20.9    Unstable angina ([facility]) I20.0    Dyslipidemia E78.5    Electrolyte abnormality E87.8    COPD exacerbation ([facility]) J44.1    Acute respiratory insufficiency R06.89    Chronic diastolic heart failure ([facility]) I50.32    Tobacco use disorder F17.200    Nasal septal deviation J34.2    Hypertrophy of left inferior nasal turbinate J34.3    Rhinogenic headache R51.9    Asymmetrical sensorineural hearing loss H90.5    Tinnitus, left ear H93.12    Psychosis ([facility]) F29    Substance-induced psychotic disorder ([facility]) F19.959    Bipolar 1 disorder, depressed ([facility]) F31.9    Polysubstance abuse ([facility] F19.10    Major depressive disorder, recurrent [facility] F33.9    Stroke-like symptom R29.90    Right arm weakness R29.898    Delirium R41.0    Paresthesias R20.2    Depression, major, recurrent ([facility]) F33.9    Depression with suicidal ideation F32.9, R39.200    Amnesia R41.3    Bipolar disorder, current episode mixed, moderate ([facility]) F31.62 shoulder surgery    Hyperlipidemia     Hypertension     Liver disease     enlarged liver - damaged with alcohol in past but no cirrhosis per patient    Pneumonia 7/24/2014    Suicidal thoughts     2015 admitted to [facility] from [facility]    Thyroid disease     Type 2 diabetes mellitus without complication, without long-term current use of insulin ([facility] 7/20/2019    Vomiting     Wears dentures     Wears glasses        Past Surgical History:        Procedure Laterality Date    ABDOMEN SURGERY      x4 removed cysts and ovary removed    CARDIAC SURGERY      heart caths, no stents    CARPAL TUNNEL RELEASE Bilateral 2016    CHOLECYSTECTOMY, LAPAROSCOPIC  6/12/15    Dr. Rica Santiago N/A 7/13/2020    CYSTOSCOPY WITH BLADDER BIOPSIES performed by Abrahan Mccurdy MD at [address], [address] Right 10/7/2004    Dr. Christopher Crawford Bilateral 2016    decompression   Frances Dibbles    Dr. Maura oCllier -[address] [place] [address] with [address] CUFF REPAIR  2004, 2014    right shoulder    SHOULDER SURGERY  2014    right    SKIN BIOPSY  2006    under left eye    STIMULATOR SURGERY N/A 11/4/2020    STAGE 1 INTERSTIM PLACEMENT performed by Abrahan Mccurdy MD at [facility] History:    Social History     Tobacco Use    Smoking status: Current Every Day Smoker     Packs/day: 1.00     Years: 30.00     Pack years: 30.00     Types: Cigarettes     Start date: 4/22/1977    Smokeless tobacco: Never Used   Substance Use Topics    Alcohol use: No     Comment: none for 2 years                                Ready to quit: Not Answered  Counseling given: Not Answered      Vital Signs (Current):   Vitals:    11/23/20 1006   BP: 139/64   Pulse: 65   Resp: 12   Temp: 96.7 °F (35.9 °C)   TempSrc: Temporal   SpO2: 99%   Weight: 150 lb 9.6 oz (68.3 kg)   Height: 5' (1.524 m) BP Readings from Last 3 Encounters:   11/23/20 139/64   11/10/20 128/72   11/04/20 135/63       NPO Status:                                                                                 BMI:   Wt Readings from Last 3 Encounters:   11/23/20 150 lb 9.6 oz (68.3 kg)   11/16/20 149 lb (67.6 kg)   11/09/20 149 lb (67.6 kg)     Body mass index is 29.41 kg/m². CBC:   Lab Results   Component Value Date    WBC 6.5 11/09/2020    RBC 3.98 11/09/2020    RBC 4.41 04/19/2012    HGB 11.2 11/09/2020    HCT 35.2 11/09/2020    MCV 88.4 11/09/2020    RDW 14.5 12/12/2019     11/09/2020       CMP:   Lab Results   Component Value Date     11/09/2020    K 4.0 11/09/2020    K 4.5 10/31/2020     11/09/2020    CO2 20 11/09/2020    BUN 7 11/09/2020    CREATININE 0.6 11/09/2020    GFRAA >60 02/25/2019    LABGLOM >90 11/09/2020    GLUCOSE 113 11/09/2020    GLUCOSE 155 12/12/2019    PROT 6.8 10/31/2020    CALCIUM 10.1 11/09/2020    BILITOT 0.2 10/31/2020    ALKPHOS 58 10/31/2020    AST 20 10/31/2020    ALT 24 10/31/2020       POC Tests: No results for input(s): POCGLU, POCNA, POCK, POCCL, POCBUN, POCHEMO, POCHCT in the last 72 hours.     Coags:   Lab Results   Component Value Date    PROTIME 14.6 12/12/2019    INR 1.08 05/17/2020    APTT 39.0 12/21/2019       HCG (If Applicable): No results found for: PREGTESTUR, PREGSERUM, HCG, HCGQUANT     ABGs: No results found for: PHART, PO2ART, EAH0EMM, KHK5BOU, BEART, B5DNKBGK     Type & Screen (If Applicable):  Lab Results   Component Value Date    LABRH POS 11/17/2017       Drug/Infectious Status (If Applicable):  Lab Results   Component Value Date    HEPCAB Negative 10/12/2015       COVID-19 Screening (If Applicable):   Lab Results   Component Value Date    COVID19 Not Detected 11/16/2020         Anesthesia Evaluation  Patient summary reviewed  Airway: Mallampati: III  TM distance: >3 FB   Neck ROM: full  Mouth opening: > = 3 FB Dental:    (+) edentulous      Pulmonary:   (+)

## 2020-11-23 NOTE — PROGRESS NOTES
Dr Pablito Rome and Dr Raul Yeboah updated that the patient has a positive drug toxicity screen. No orders received.

## 2020-11-24 ENCOUNTER — TELEPHONE (OUTPATIENT)
Dept: UROLOGY | Age: 65
End: 2020-11-24

## 2020-11-24 RX ORDER — HYDROCODONE BITARTRATE AND ACETAMINOPHEN 5; 325 MG/1; MG/1
1 TABLET ORAL EVERY 6 HOURS PRN
Qty: 28 TABLET | Refills: 0 | Status: SHIPPED | OUTPATIENT
Start: 2020-11-24 | End: 2020-12-01 | Stop reason: SDUPTHER

## 2020-11-24 NOTE — TELEPHONE ENCOUNTER
Patient would like something ordered for pain. She only has 2 norco left. She rates the pain 8-9 on scale of 0-10 where the interstim is placed. Please advise. Thank you.

## 2020-11-24 NOTE — OP NOTE
[address] [place] [address]                                OPERATIVE REPORT    PATIENT NAME: Jessica Shanks                  :        1955  MED REC NO:   [id number]                           ROOM:  ACCOUNT NO:   [de-identified]                           ADMIT DATE: 2020  PROVIDER:     Brett Jauregui M.D.    DATE OF PROCEDURE:  2020    PREOPERATIVE DIAGNOSES:  Urgency, frequency, urge incontinence. POSTOPERATIVE DIAGNOSES:  Urgency, frequency, urge incontinence. PROCEDURES PERFORMED:  Placement of sacral nerve stimulator battery and  programming of sacral nerve stimulator. ANESTHESIA:  MAC and local.    SURGEON:  Brett Jauregui MD    INDICATIONS:  This is a 51-year-old white female who has moderately  severe urgency, frequency, and urge incontinence. She has failed all  types of conservative therapy. She underwent placement of a stage I  sacral nerve stimulator on 2020. Monitoring for frequency,  urgency, urinary incontinent episodes, number of pads used, etc., all  improved by much grater than 50%. As such, she wanted to proceed with a  permanent sacral nerve stimulator connection to a battery and  programming as soon as possible. She now comes in for this procedure. OPERATIVE NOTE:  After informed consent was signed and the patient  properly identified, the patient was taken to the operating room, placed  on the operating room table in the prone position where she was given  monitored anesthetic care. Once she was heavily sedated, the low lumbar  and sacral areas and both buttocks regions were prepped and draped in  the usual sterile manner. The percutaneous lead extension wiring was  noted. The interrupted simple sutures of Monocryl had been removed  prior to prepping and draping. 1% plain lidocaine with epinephrine was  used over the previous incision.   Once there was adequate local  anesthesia, a #15 blade was used to make the incision over the prior  incision. Bovie was used for hemostasis and incising the subcutaneous  tissue to where the lead and connection of the lead and percutaneous  extension were noted. The lead extension wiring was disconnected from  the lead itself. From under the draped side, extension wire was removed  from the patient. Water was used for irrigation of the subcutaneous  pocket. I made the pocket deeper in the subcutaneous side of the right  buttock. After noting excellent hemostasis, the battery was connected  to the lead and secured with the special Miki wrench. Battery was then  positioned in the patient's subcutaneous pocket. Impedance was then  tested and found to be 100% for all four lead positions. The incision  was then closed in two layers with a 2-0 Vicryl for the deeper layer in  a running simple fashion and a 4-0 Monocryl was used for a subcuticular  closer of the skin. After this, Benzoin, Steri-Strips, 4x4, and  Tegaderm were placed over the incision. This completed the procedure. The patient tolerated the procedure well. No apparent complications. Estimated blood loss was minimal.  9 milliliters of lidocaine with  epinephrine were used. The patient returned to her room.         Nicho Mcrae M.D.    D: 11/23/2020 13:56:48       T: 11/23/2020 22:13:21     [email or web address]  Job#: [id number]     Doc#: [id number]    CC:

## 2020-11-30 NOTE — TELEPHONE ENCOUNTER
Lala Thomason called requesting a refill on the following medications:  Requested Prescriptions     Pending Prescriptions Disp Refills    HYDROcodone-acetaminophen (NORCO) 5-325 MG per tablet 28 tablet 0     Sig: Take 1 tablet by mouth every 6 hours as needed for Pain for up to 7 days. Intended supply: 7 days.  Take lowest dose possible to manage pain     Pharmacy verified:  [facility]      Date of last visit: 11-16-20   Date of next visit (if applicable): 48/10/2079

## 2020-12-01 RX ORDER — HYDROCODONE BITARTRATE AND ACETAMINOPHEN 5; 325 MG/1; MG/1
1 TABLET ORAL EVERY 6 HOURS PRN
Qty: 28 TABLET | Refills: 0 | Status: SHIPPED | OUTPATIENT
Start: 2020-12-01 | End: 2020-12-08

## 2020-12-17 ENCOUNTER — HOSPITAL ENCOUNTER (OUTPATIENT)
Dept: GENERAL RADIOLOGY | Age: 65
Discharge: HOME | End: 2020-12-17
Payer: MEDICAID

## 2020-12-17 ENCOUNTER — TELEPHONE (OUTPATIENT)
Dept: UROLOGY | Age: 65
End: 2020-12-17

## 2020-12-17 ENCOUNTER — HOSPITAL ENCOUNTER (OUTPATIENT)
Age: 65
Discharge: HOME | End: 2020-12-17
Payer: MEDICAID

## 2020-12-17 ENCOUNTER — OFFICE VISIT (OUTPATIENT)
Dept: UROLOGY | Age: 65
End: 2020-12-17

## 2020-12-17 VITALS — WEIGHT: 149 LBS | TEMPERATURE: 95.5 F | BODY MASS INDEX: 29.25 KG/M2 | HEIGHT: 60 IN

## 2020-12-17 PROCEDURE — 99024 POSTOP FOLLOW-UP VISIT: CPT | Performed by: UROLOGY

## 2020-12-17 PROCEDURE — 72220 X-RAY EXAM SACRUM TAILBONE: CPT

## 2020-12-17 RX ORDER — HYDROCODONE BITARTRATE AND ACETAMINOPHEN 5; 325 MG/1; MG/1
1 TABLET ORAL EVERY 6 HOURS PRN
Qty: 20 TABLET | Refills: 0 | Status: SHIPPED | OUTPATIENT
Start: 2020-12-17 | End: 2020-12-22

## 2020-12-17 NOTE — TELEPHONE ENCOUNTER
Let her know the wire is in good position.  She needs to talk to [facility] about adjustments to the devise

## 2020-12-18 NOTE — TELEPHONE ENCOUNTER
Patient advised the wire is in good position and needs to call Wilfred. She voiced understanding and stated she has his number.

## 2020-12-28 NOTE — TELEPHONE ENCOUNTER
Juanita Cordero called requesting a refill on the following medications:  Requested Prescriptions     Pending Prescriptions Disp Refills    HYDROcodone-acetaminophen (NORCO) 5-325 MG per tablet 20 tablet 0     Sig: Take 1 tablet by mouth every 6 hours as needed for Pain for up to 5 days. Intended supply: 7 days.  Take lowest dose possible to manage pain     [facility]  [facility]    Date of last visit: 12/17/2020  Date of next visit (if applicable): 21/91/3607

## 2020-12-30 RX ORDER — HYDROCODONE BITARTRATE AND ACETAMINOPHEN 5; 325 MG/1; MG/1
1 TABLET ORAL EVERY 6 HOURS PRN
Qty: 20 TABLET | Refills: 0 | OUTPATIENT
Start: 2020-12-30 | End: 2021-01-04

## 2020-12-30 NOTE — TELEPHONE ENCOUNTER
Let her know I cannot continue to prescribe narcotics. She needs to see a pain specialist or PCP if she wants to get narcotics.

## 2021-01-02 ENCOUNTER — HOSPITAL ENCOUNTER (EMERGENCY)
Age: 66
Discharge: HOME | End: 2021-01-02
Attending: EMERGENCY MEDICINE
Payer: MEDICAID

## 2021-01-02 VITALS
SYSTOLIC BLOOD PRESSURE: 161 MMHG | TEMPERATURE: 97.6 F | HEART RATE: 70 BPM | HEIGHT: 60 IN | OXYGEN SATURATION: 96 % | WEIGHT: 149 LBS | DIASTOLIC BLOOD PRESSURE: 90 MMHG | RESPIRATION RATE: 18 BRPM | BODY MASS INDEX: 29.25 KG/M2

## 2021-01-02 DIAGNOSIS — F19.94 SUBSTANCE INDUCED MOOD DISORDER (HCC): Primary | ICD-10-CM

## 2021-01-02 DIAGNOSIS — F14.10 NONDEPENDENT COCAINE ABUSE (HCC): ICD-10-CM

## 2021-01-02 LAB
ACETAMINOPHEN LEVEL: < 5 UG/ML (ref 0–20)
ALBUMIN SERPL-MCNC: 4.9 G/DL (ref 3.5–5.1)
ALP BLD-CCNC: 69 U/L (ref 38–126)
ALT SERPL-CCNC: 25 U/L (ref 11–66)
AMPHETAMINE+METHAMPHETAMINE URINE SCREEN: NEGATIVE
ANION GAP SERPL CALCULATED.3IONS-SCNC: 16 MEQ/L (ref 8–16)
AST SERPL-CCNC: 20 U/L (ref 5–40)
BARBITURATE QUANTITATIVE URINE: NEGATIVE
BASOPHILS # BLD: 0.5 %
BASOPHILS ABSOLUTE: 0.1 THOU/MM3 (ref 0–0.1)
BENZODIAZEPINE QUANTITATIVE URINE: NEGATIVE
BILIRUB SERPL-MCNC: 0.4 MG/DL (ref 0.3–1.2)
BUN BLDV-MCNC: 7 MG/DL (ref 7–22)
CALCIUM SERPL-MCNC: 11.2 MG/DL (ref 8.5–10.5)
CANNABINOID QUANTITATIVE URINE: NEGATIVE
CHLORIDE BLD-SCNC: 99 MEQ/L (ref 98–111)
CO2: 22 MEQ/L (ref 23–33)
COCAINE METABOLITE QUANTITATIVE URINE: POSITIVE
CREAT SERPL-MCNC: 0.8 MG/DL (ref 0.4–1.2)
EOSINOPHIL # BLD: 0.5 %
EOSINOPHILS ABSOLUTE: 0.1 THOU/MM3 (ref 0–0.4)
ERYTHROCYTE [DISTWIDTH] IN BLOOD BY AUTOMATED COUNT: 13.8 % (ref 11.5–14.5)
ERYTHROCYTE [DISTWIDTH] IN BLOOD BY AUTOMATED COUNT: 43.9 FL (ref 35–45)
ETHYL ALCOHOL, SERUM: < 0.01 %
GFR SERPL CREATININE-BSD FRML MDRD: 72 ML/MIN/1.73M2
GLUCOSE BLD-MCNC: 108 MG/DL (ref 70–108)
HCT VFR BLD CALC: 43.9 % (ref 37–47)
HEMOGLOBIN: 14.6 GM/DL (ref 12–16)
IMMATURE GRANS (ABS): 0.03 THOU/MM3 (ref 0–0.07)
IMMATURE GRANULOCYTES: 0.3 %
LYMPHOCYTES # BLD: 45.2 %
LYMPHOCYTES ABSOLUTE: 4.9 THOU/MM3 (ref 1–4.8)
MCH RBC QN AUTO: 29.3 PG (ref 26–33)
MCHC RBC AUTO-ENTMCNC: 33.3 GM/DL (ref 32.2–35.5)
MCV RBC AUTO: 88 FL (ref 81–99)
MONOCYTES # BLD: 5.9 %
MONOCYTES ABSOLUTE: 0.6 THOU/MM3 (ref 0.4–1.3)
NUCLEATED RED BLOOD CELLS: 0 /100 WBC
OPIATES, URINE: NEGATIVE
OSMOLALITY CALCULATION: 272.3 MOSMOL/KG (ref 275–300)
OXYCODONE: NEGATIVE
PHENCYCLIDINE QUANTITATIVE URINE: NEGATIVE
PLATELET # BLD: 366 THOU/MM3 (ref 130–400)
PMV BLD AUTO: 9.6 FL (ref 9.4–12.4)
POTASSIUM SERPL-SCNC: 3.7 MEQ/L (ref 3.5–5.2)
RBC # BLD: 4.99 MILL/MM3 (ref 4.2–5.4)
SALICYLATE, SERUM: < 0.3 MG/DL (ref 2–10)
SEG NEUTROPHILS: 47.6 %
SEGMENTED NEUTROPHILS ABSOLUTE COUNT: 5.2 THOU/MM3 (ref 1.8–7.7)
SODIUM BLD-SCNC: 137 MEQ/L (ref 135–145)
T4 FREE: 1.7 NG/DL (ref 0.93–1.76)
TOTAL PROTEIN: 8.3 G/DL (ref 6.1–8)
TROPONIN T: < 0.01 NG/ML
TSH SERPL DL<=0.05 MIU/L-ACNC: 5.67 UIU/ML (ref 0.4–4.2)
WBC # BLD: 10.9 THOU/MM3 (ref 4.8–10.8)

## 2021-01-02 PROCEDURE — 99284 EMERGENCY DEPT VISIT MOD MDM: CPT

## 2021-01-02 PROCEDURE — 80053 COMPREHEN METABOLIC PANEL: CPT

## 2021-01-02 PROCEDURE — 84439 ASSAY OF FREE THYROXINE: CPT

## 2021-01-02 PROCEDURE — G0480 DRUG TEST DEF 1-7 CLASSES: HCPCS

## 2021-01-02 PROCEDURE — 80307 DRUG TEST PRSMV CHEM ANLYZR: CPT

## 2021-01-02 PROCEDURE — 84484 ASSAY OF TROPONIN QUANT: CPT

## 2021-01-02 PROCEDURE — 84443 ASSAY THYROID STIM HORMONE: CPT

## 2021-01-02 PROCEDURE — 85025 COMPLETE CBC W/AUTO DIFF WBC: CPT

## 2021-01-02 PROCEDURE — 36415 COLL VENOUS BLD VENIPUNCTURE: CPT

## 2021-01-02 PROCEDURE — 6370000000 HC RX 637 (ALT 250 FOR IP): Performed by: EMERGENCY MEDICINE

## 2021-01-02 RX ORDER — HYDROXYZINE PAMOATE 25 MG/1
50 CAPSULE ORAL ONCE
Status: COMPLETED | OUTPATIENT
Start: 2021-01-02 | End: 2021-01-02

## 2021-01-02 RX ORDER — HYDROXYZINE PAMOATE 25 MG/1
50 CAPSULE ORAL ONCE
Status: DISCONTINUED | OUTPATIENT
Start: 2021-01-02 | End: 2021-01-02

## 2021-01-02 RX ADMIN — HYDROXYZINE PAMOATE 50 MG: 25 CAPSULE ORAL at 05:56

## 2021-01-02 ASSESSMENT — ENCOUNTER SYMPTOMS
ABDOMINAL PAIN: 0
CHEST TIGHTNESS: 0
RHINORRHEA: 0
EYE REDNESS: 0
WHEEZING: 0
STRIDOR: 0
NAUSEA: 0
EYE ITCHING: 0
EYE DISCHARGE: 0
SORE THROAT: 0
ABDOMINAL DISTENTION: 0
EYE PAIN: 0
COUGH: 0
VOMITING: 0
SHORTNESS OF BREATH: 0
BACK PAIN: 0
CONSTIPATION: 0
DIARRHEA: 0
PHOTOPHOBIA: 0

## 2021-01-02 NOTE — PROGRESS NOTES
03:51 Call received from [facility] with [facility] stating this patient has been assessed and request medical clearance [facility]. [facility] and may look for placement. 05:25 Labs are faxed to [facility] at number provided. [phone]  06:09 Spoke with [facility] at [facility] . Selam CHAMBERS requests patient be safely transported to [facility]. ER staff updated on plan of care.

## 2021-01-02 NOTE — ED NOTES
Pt arrives to ED from [address] c/o crack use this evening. Pt states that she wanted more crack and \"felt useless and needed to stop\" and thought that \" not being alive would make her stop\" pt states she is seeking help to stop using crack. Pt denies any homicidal or suicidal thoughts at this time. Pt states that she is anxious. Pt shows no signs of distress. Pt labs drawn at this time. Patient placed in safe room that is ligature resistant with continuous monitoring in place. Provider notified, requested an assessment by behavioral health . Patient belongings secured in a locked lockers outside of the room. Explained suicide prevention precautions to the patient including constant observer.       Ada Johnson RN  01/02/21 1914

## 2021-01-02 NOTE — ED NOTES
Bed: 025A  Expected date: 1/2/21  Expected time: 3:58 AM  Means of arrival: [facility]  Comments:     Marla Contreras RN  01/02/21 0828

## 2021-01-02 NOTE — ED PROVIDER NOTES
Allergic/Immunologic: Negative for environmental allergies and food allergies. Neurological: Negative for dizziness, tremors, syncope, weakness and headaches. Psychiatric/Behavioral: Positive for decreased concentration, dysphoric mood and sleep disturbance. Negative for agitation, behavioral problems, confusion, self-injury and suicidal ideas. The patient is nervous/anxious.          PAST MEDICAL HISTORY     Past Medical History:   Diagnosis Date    Acute renal failure with acute cortical necrosis (HCC) 12/21/2019    Allergic rhinitis     Arthritis     spine    Bipolar 1 disorder ([address]     Bipolar disorder ([address]     Blood circulation, collateral     Cancer ([address] 2011    cancerous polyps removed - Dr. Codi Teixeira Colon polyps     COPD (chronic obstructive pulmonary disease) ([address] 7/24/2014    Coronary vasospasm ([address] 8/17/2019    Depression     Diverticulitis of colon     GERD (gastroesophageal reflux disease)     Hiatal hernia     History of arterial ischemic stroke 7/20/2019    History of colonoscopy 2002    History of kidney stones     Hx of blood clots 6/17/2014    PE and collar bone area after shoulder surgery    Hyperlipidemia     Hypertension     Liver disease     enlarged liver - damaged with alcohol in past but no cirrhosis per patient    Pneumonia 7/24/2014    Suicidal thoughts     2015 admitted to [facility]E from [facility]    Thyroid disease     Type 2 diabetes mellitus without complication, without long-term current use of insulin ([address] 7/20/2019    Vomiting     Wears dentures     Wears glasses        SURGICAL HISTORY       Past Surgical History:   Procedure Laterality Date    ABDOMEN SURGERY      x4 removed cysts and ovary removed    CARDIAC SURGERY      heart caths, no stents    CARPAL TUNNEL RELEASE Bilateral 2016    CHOLECYSTECTOMY, LAPAROSCOPIC  6/12/15    Dr. Lucio Force    COLONOSCOPY  2011   [address] N/A 7/13/2020    CYSTOSCOPY WITH BLADDER BIOPSIES performed by Lynnwood Holstein, MD at [address], [address] Right 10/7/2004    Dr. Kaylene Freeman Bilateral 2016    decompression   [facility] [address] [place] [address] with [facility]    ROTATOR CUFF REPAIR  2004, 2014    right shoulder    SHOULDER SURGERY  2014    right    SKIN BIOPSY  2006    under left eye    STIMULATOR SURGERY N/A 11/4/2020    STAGE 1 INTERSTIM PLACEMENT performed by Lynnwood Holstein, MD at [place] [address] 11/23/2020    PLACEMENT OF STAGE II INTERSTIM performed by Lynnwood Holstein, MD at [address] [facility]       Previous Medications    ACETAMINOPHEN (MAPAP) 325 MG TABLET    Take 650 mg by mouth every 6 hours as needed for Pain    ALBUTEROL (PROVENTIL) (2.5 MG/3ML) 0.083% NEBULIZER SOLUTION    Take 3 mLs by nebulization every 6 hours as needed for Wheezing    ARIPIPRAZOLE (ABILIFY) 2 MG TABLET    Take 2 mg by mouth daily    ASPIRIN 81 MG EC TABLET    Take 81 mg by mouth daily    ATORVASTATIN (LIPITOR) 40 MG TABLET    Take 40 mg by mouth nightly     CALCIUM CARBONATE 500 MG CHEW    Take by mouth 2 tabs as needed    CARVEDILOL (COREG) 3.125 MG TABLET    TAKE 1 TABLET BY MOUTH 2 TIMES DAILY (WITH MEALS)    CLOPIDOGREL (PLAVIX) 75 MG TABLET    Take 75 mg by mouth daily    ESCITALOPRAM (LEXAPRO) 10 MG TABLET    Take 10 mg by mouth daily    ESCITALOPRAM (LEXAPRO) 20 MG TABLET    Take 20 mg by mouth daily    FAMOTIDINE (PEPCID) 20 MG TABLET    Take 20 mg by mouth 2 times daily    FENOFIBRATE 160 MG TABLET    Take 145 mg by mouth daily     FERROUS SULFATE 325 (65 FE) MG TABLET    Take 1 tablet by mouth 2 times daily    FLUTICASONE (FLONASE) 50 MCG/ACT NASAL SPRAY    1 spray by Each Nostril route 2 times daily    FOLIC ACID (FOLVITE) 1 MG TABLET    Take 1 mg by mouth daily    HYDROCHLOROTHIAZIDE (HYDRODIURIL) 25 MG TABLET    TAKE 1 TABLET BY MOUTH TWICE DAILY    HYDROXYZINE (VISTARIL) 50 MG CAPSULE    Take 50 mg by mouth 3 times daily as needed for Itching or Anxiety     IBUPROFEN (IBU) 600 MG TABLET    Take 1 tablet by mouth every 6 hours as needed for Pain    ISOSORBIDE DINITRATE (ISORDIL) 10 MG TABLET    TAKE 1 TABLET BY MOUTH 3 TIMES DAILY    LEVOTHYROXINE (SYNTHROID) 75 MCG TABLET    Take 1 tablet by mouth daily everyday except  take 150 mcg. METFORMIN (GLUCOPHAGE) 1000 MG TABLET    Take 1,000 mg by mouth 2 times daily (with meals)    NIACIN 250 MG EXTENDED RELEASE CAPSULE    Take 2 capsules by mouth nightly    NYSTATIN (MYCOSTATIN) POWD POWDER    Apply 1 each topically 2 times daily    PANCRELIPASE, LIP-PROT-AMYL, (ZENPEP) [id number] UNITS CPEP    2 caps 1600 1 cap at HS    POTASSIUM CHLORIDE (KLOR-CON M) 20 MEQ EXTENDED RELEASE TABLET    Take 40 mEq by mouth daily     QUETIAPINE (SEROQUEL XR) 300 MG EXTENDED RELEASE TABLET    Take 2 tablets by mouth nightly    RESPIRATORY THERAPY SUPPLIES (NEBULIZER/TUBING/MOUTHPIECE) KIT    1 kit by Does not apply route every 6 hours as needed (shortness of breath)    ROFLUMILAST (DALIRESP) 500 MCG TABLET    Take 500 mcg by mouth daily    TIOTROPIUM BROMIDE MONOHYDRATE (SPIRIVA HANDIHALER IN)    Inhale 2 puffs into the lungs daily    TOPIRAMATE (TOPAMAX) 50 MG TABLET    Take 1 tablet by mouth 2 times daily    TRAZODONE (DESYREL) 100 MG TABLET    Take 2 tablets by mouth nightly       ALLERGIES     Seasonal    FAMILY HISTORY     She indicated that her mother is . She indicated that her father is . She indicated that her sister is alive. She indicated that her brother is alive. She indicated that the status of her maternal grandmother is unknown. She indicated that the status of her maternal grandfather is unknown. She indicated that the status of her paternal grandmother is unknown. She indicated that the status of her paternal grandfather is unknown.  She indicated that the status of her maternal uncle is unknown.   family history or rales. Chest:      Chest wall: No tenderness. Abdominal:      General: Bowel sounds are normal. There is no distension. Palpations: Abdomen is soft. There is no mass. Tenderness: There is no abdominal tenderness. There is no guarding or rebound. Hernia: No hernia is present. Musculoskeletal:         General: No tenderness or deformity. Lymphadenopathy:      Cervical: No cervical adenopathy. Skin:     General: Skin is warm and dry. Capillary Refill: Capillary refill takes less than 2 seconds. Coloration: Skin is not pale. Findings: No erythema or rash. Neurological:      Mental Status: She is alert and oriented to person, place, and time. Cranial Nerves: No cranial nerve deficit. Sensory: No sensory deficit. Motor: No abnormal muscle tone. Coordination: Coordination normal.      Deep Tendon Reflexes: Reflexes normal.   Psychiatric:         Thought Content: Thought content normal.         Judgment: Judgment normal.      Comments: Depressed mood.           DIFFERENTIAL DIAGNOSIS:   Anxiety, depression, bipolar disorder, substance induced mood disorder    DIAGNOSTIC RESULTS   EKG: All EKG's are interpreted by the Emergency Department Physician who either signs or Co-signsthis chart in the absence of a cardiologist.  Interpreted by me  None    RADIOLOGY: non-plain film images(s) such as CT, Ultrasound and MRI are read by the radiologist.  No orders to display       LABS:   Results for orders placed or performed during the hospital encounter of 01/02/21   CBC Auto Differential   Result Value Ref Range    WBC 10.9 (H) 4.8 - 10.8 thou/mm3    RBC 4.99 4.20 - 5.40 mill/mm3    Hemoglobin 14.6 12.0 - 16.0 gm/dl    Hematocrit 43.9 37.0 - 47.0 %    MCV 88.0 81.0 - 99.0 fL    MCH 29.3 26.0 - 33.0 pg    MCHC 33.3 32.2 - 35.5 gm/dl    RDW-CV 13.8 11.5 - 14.5 %    RDW-SD 43.9 35.0 - 45.0 fL    Platelets 659 629 - 886 thou/mm3    MPV 9.6 9.4 - 12.4 fL    Seg Neutrophils 47.6 %    Lymphocytes 45.2 %    Monocytes 5.9 %    Eosinophils 0.5 %    Basophils 0.5 %    Immature Granulocytes 0.3 %    Segs Absolute 5.2 1.8 - 7.7 thou/mm3    Lymphocytes Absolute 4.9 (H) 1.0 - 4.8 thou/mm3    Monocytes Absolute 0.6 0.4 - 1.3 thou/mm3    Eosinophils Absolute 0.1 0.0 - 0.4 thou/mm3    Basophils Absolute 0.1 0.0 - 0.1 thou/mm3    Immature Grans (Abs) 0.03 0.00 - 0.07 thou/mm3    nRBC 0 /100 wbc   Comprehensive Metabolic Panel   Result Value Ref Range    Glucose 108 70 - 108 mg/dL    CREATININE 0.8 0.4 - 1.2 mg/dL    BUN 7 7 - 22 mg/dL    Sodium 137 135 - 145 meq/L    Potassium 3.7 3.5 - 5.2 meq/L    Chloride 99 98 - 111 meq/L    CO2 22 (L) 23 - 33 meq/L    Calcium 11.2 (H) 8.5 - 10.5 mg/dL    AST 20 5 - 40 U/L    Alkaline Phosphatase 69 38 - 126 U/L    Total Protein 8.3 (H) 6.1 - 8.0 g/dL    Alb 4.9 3.5 - 5.1 g/dL    Total Bilirubin 0.4 0.3 - 1.2 mg/dL    ALT 25 11 - 66 U/L   TSH with Reflex   Result Value Ref Range    TSH 5.670 (H) 0.400 - 4.200 uIU/mL   Ethanol   Result Value Ref Range    ETHYL ALCOHOL, SERUM < 0.01 0.00 %   Troponin   Result Value Ref Range    Troponin T < 0.010 ng/ml   Urine Drug Screen   Result Value Ref Range    AMPHETAMINE+METHAMPHETAMINE URINE SCREEN Negative NEGATIVE    Barbiturate Quant, Ur Negative NEGATIVE    Benzodiazepine Quant, Ur Negative NEGATIVE    Cannabinoid Quant, Ur Negative NEGATIVE    Cocaine Metab Quant, Ur POSITIVE NEGATIVE    Opiates, Urine Negative NEGATIVE    Oxycodone Negative NEGATIVE    PCP Quant, Ur Negative NEGATIVE   Salicylate Level   Result Value Ref Range    Salicylate, Serum < 0.3 (L) 2.0 - 10.0 mg/dL   Acetaminophen level   Result Value Ref Range    Acetaminophen Level < 5.0 0.0 - 20.0 ug/mL   Anion Gap   Result Value Ref Range    Anion Gap 16.0 8.0 - 16.0 meq/L   Glomerular Filtration Rate, Estimated   Result Value Ref Range    Est, Glom Filt Rate 72 (A) ml/min/1.73m2   Osmolality   Result Value Ref Range    Osmolality Calc 272.3 (L) 275.0 - 300.0 mOsmol/kg   T4, Free   Result Value Ref Range    T4 Free 1.70 0.93 - 1.76 ng/dL       EMERGENCY DEPARTMENT COURSE:   Vitals:    Vitals:    01/02/21 0415   Pulse: 70   Resp: 18   Temp: 97.6 °F (36.4 °C)   TempSrc: Oral   SpO2: 96%   Weight: 149 lb (67.6 kg)   Height: 5' (1.524 m)     4:16 AM: Patient is seen and evaluated in a timely fashion. ACTIONS:    Labs Reviewed   CBC WITH AUTO DIFFERENTIAL   COMPREHENSIVE METABOLIC PANEL   TSH WITH REFLEX   ETHANOL   TROPONIN   URINE DRUG SCREEN   SALICYLATE LEVEL   ACETAMINOPHEN LEVEL     Medications   hydrOXYzine (VISTARIL) capsule 50 mg (50 mg Oral Given 1/2/21 0556)       MEDICAL DECISION MAKINGS:    Medically cleared. Discharged back to [facility]. CRITICAL CARE:   None    CONSULTS:  None    PROCEDURES:  None    FINAL IMPRESSION      1. Substance induced mood disorder ([facility]    2. Nondependent cocaine abuse [facility])          DISPOSITION/PLAN   Home    PATIENT REFERRED TO:  David Brower  [address] [facility] [address]  [phone]            DISCHARGE MEDICATIONS:  New Prescriptions    No medications on file       (Please note that portions of this note were completed with a voice recognition program.  Efforts were made to edit the dictations but occasionally words aremis-transcribed.)    MD Lucio Choi MD  01/02/21 2867

## 2021-01-02 NOTE — ED NOTES
Pt medicated per MAR. Pt tolerated well. Respirations unlabored.       Liliane Johnson RN  01/02/21 0244

## 2021-01-21 ENCOUNTER — HOSPITAL ENCOUNTER (OUTPATIENT)
Age: 66
Discharge: HOME | End: 2021-01-21
Payer: MEDICAID

## 2021-01-21 LAB
ERYTHROCYTE [DISTWIDTH] IN BLOOD BY AUTOMATED COUNT: 13.2 % (ref 11.5–14.5)
ERYTHROCYTE [DISTWIDTH] IN BLOOD BY AUTOMATED COUNT: 43.5 FL (ref 35–45)
HCT VFR BLD CALC: 42.2 % (ref 37–47)
HEMOGLOBIN: 13.6 GM/DL (ref 12–16)
MCH RBC QN AUTO: 28.9 PG (ref 26–33)
MCHC RBC AUTO-ENTMCNC: 32.2 GM/DL (ref 32.2–35.5)
MCV RBC AUTO: 89.8 FL (ref 81–99)
PLATELET # BLD: 321 THOU/MM3 (ref 130–400)
PMV BLD AUTO: 10.1 FL (ref 9.4–12.4)
RBC # BLD: 4.7 MILL/MM3 (ref 4.2–5.4)
WBC # BLD: 6.4 THOU/MM3 (ref 4.8–10.8)

## 2021-01-21 PROCEDURE — 36415 COLL VENOUS BLD VENIPUNCTURE: CPT

## 2021-01-21 PROCEDURE — 85027 COMPLETE CBC AUTOMATED: CPT

## 2021-02-20 ENCOUNTER — HOSPITAL ENCOUNTER (INPATIENT)
Age: 66
LOS: 2 days | Discharge: HOME | DRG: 816 | End: 2021-02-22
Attending: FAMILY MEDICINE | Admitting: STUDENT IN AN ORGANIZED HEALTH CARE EDUCATION/TRAINING PROGRAM
Payer: MEDICAID

## 2021-02-20 ENCOUNTER — APPOINTMENT (OUTPATIENT)
Dept: GENERAL RADIOLOGY | Age: 66
DRG: 816 | End: 2021-02-20
Payer: MEDICAID

## 2021-02-20 DIAGNOSIS — R29.90 STROKE-LIKE SYMPTOMS: ICD-10-CM

## 2021-02-20 DIAGNOSIS — F14.10 NONDEPENDENT COCAINE ABUSE (HCC): ICD-10-CM

## 2021-02-20 DIAGNOSIS — G44.89 OTHER HEADACHE SYNDROME: ICD-10-CM

## 2021-02-20 DIAGNOSIS — R07.9 CHEST PAIN, UNSPECIFIED TYPE: Primary | ICD-10-CM

## 2021-02-20 LAB
ALBUMIN SERPL-MCNC: 3.7 G/DL (ref 3.5–5.1)
ALP BLD-CCNC: 54 U/L (ref 38–126)
ALT SERPL-CCNC: 12 U/L (ref 11–66)
AMPHETAMINE+METHAMPHETAMINE URINE SCREEN: NEGATIVE
ANION GAP SERPL CALCULATED.3IONS-SCNC: 6 MEQ/L (ref 8–16)
APTT: 32.9 SECONDS (ref 22–38)
APTT: 33 SECONDS (ref 22–38)
AST SERPL-CCNC: 13 U/L (ref 5–40)
BACTERIA: ABNORMAL
BARBITURATE QUANTITATIVE URINE: NEGATIVE
BASOPHILS # BLD: 0.7 %
BASOPHILS ABSOLUTE: 0 THOU/MM3 (ref 0–0.1)
BENZODIAZEPINE QUANTITATIVE URINE: NEGATIVE
BILIRUB SERPL-MCNC: 0.4 MG/DL (ref 0.3–1.2)
BILIRUBIN URINE: NEGATIVE
BLOOD, URINE: NEGATIVE
BUN BLDV-MCNC: 9 MG/DL (ref 7–22)
CALCIUM SERPL-MCNC: 9.5 MG/DL (ref 8.5–10.5)
CANNABINOID QUANTITATIVE URINE: NEGATIVE
CASTS: ABNORMAL /LPF
CASTS: ABNORMAL /LPF
CHARACTER, URINE: CLEAR
CHLORIDE BLD-SCNC: 104 MEQ/L (ref 98–111)
CO2: 25 MEQ/L (ref 23–33)
COCAINE METABOLITE QUANTITATIVE URINE: POSITIVE
COLOR: YELLOW
CREAT SERPL-MCNC: 0.9 MG/DL (ref 0.4–1.2)
CRYSTALS: ABNORMAL
EKG ATRIAL RATE: 69 BPM
EKG P AXIS: 59 DEGREES
EKG P-R INTERVAL: 136 MS
EKG Q-T INTERVAL: 414 MS
EKG QRS DURATION: 80 MS
EKG QTC CALCULATION (BAZETT): 443 MS
EKG R AXIS: 16 DEGREES
EKG T AXIS: 61 DEGREES
EKG VENTRICULAR RATE: 69 BPM
EOSINOPHIL # BLD: 0.5 %
EOSINOPHILS ABSOLUTE: 0 THOU/MM3 (ref 0–0.4)
EPITHELIAL CELLS, UA: ABNORMAL /HPF
ERYTHROCYTE [DISTWIDTH] IN BLOOD BY AUTOMATED COUNT: 13.2 % (ref 11.5–14.5)
ERYTHROCYTE [DISTWIDTH] IN BLOOD BY AUTOMATED COUNT: 43.1 FL (ref 35–45)
GFR SERPL CREATININE-BSD FRML MDRD: 63 ML/MIN/1.73M2
GLUCOSE BLD-MCNC: 91 MG/DL (ref 70–108)
GLUCOSE, URINE: NEGATIVE MG/DL
HCT VFR BLD CALC: 37.1 % (ref 37–47)
HEMOCCULT STL QL: NEGATIVE
HEMOGLOBIN: 11.8 GM/DL (ref 12–16)
IMMATURE GRANS (ABS): 0.03 THOU/MM3 (ref 0–0.07)
IMMATURE GRANULOCYTES: 0.5 %
INR BLD: 1.12 (ref 0.85–1.13)
KETONES, URINE: NEGATIVE
LEUKOCYTE EST, POC: ABNORMAL
LYMPHOCYTES # BLD: 32.2 %
LYMPHOCYTES ABSOLUTE: 1.9 THOU/MM3 (ref 1–4.8)
MCH RBC QN AUTO: 28.7 PG (ref 26–33)
MCHC RBC AUTO-ENTMCNC: 31.8 GM/DL (ref 32.2–35.5)
MCV RBC AUTO: 90.3 FL (ref 81–99)
MISCELLANEOUS LAB TEST RESULT: ABNORMAL
MONOCYTES # BLD: 9 %
MONOCYTES ABSOLUTE: 0.5 THOU/MM3 (ref 0.4–1.3)
NITRITE, URINE: NEGATIVE
NUCLEATED RED BLOOD CELLS: 0 /100 WBC
OPIATES, URINE: NEGATIVE
OSMOLALITY CALCULATION: 268.4 MOSMOL/KG (ref 275–300)
OXYCODONE: NEGATIVE
PH UA: 6.5 (ref 5–9)
PHENCYCLIDINE QUANTITATIVE URINE: NEGATIVE
PLATELET # BLD: 227 THOU/MM3 (ref 130–400)
PMV BLD AUTO: 10 FL (ref 9.4–12.4)
POTASSIUM REFLEX MAGNESIUM: 4.1 MEQ/L (ref 3.5–5.2)
PRO-BNP: 440.1 PG/ML (ref 0–900)
PROTEIN UA: NEGATIVE MG/DL
RBC # BLD: 4.11 MILL/MM3 (ref 4.2–5.4)
RBC URINE: ABNORMAL /HPF
RENAL EPITHELIAL, UA: ABNORMAL
SARS-COV-2, NAAT: NOT DETECTED
SEG NEUTROPHILS: 57.1 %
SEGMENTED NEUTROPHILS ABSOLUTE COUNT: 3.3 THOU/MM3 (ref 1.8–7.7)
SODIUM BLD-SCNC: 135 MEQ/L (ref 135–145)
SPECIFIC GRAVITY UA: 1.01 (ref 1–1.03)
TOTAL PROTEIN: 6.4 G/DL (ref 6.1–8)
TROPONIN T: < 0.01 NG/ML
TROPONIN T: < 0.01 NG/ML
UROBILINOGEN, URINE: 1 EU/DL (ref 0–1)
WBC # BLD: 5.8 THOU/MM3 (ref 4.8–10.8)
WBC UA: ABNORMAL /HPF
YEAST: ABNORMAL

## 2021-02-20 PROCEDURE — 73110 X-RAY EXAM OF WRIST: CPT

## 2021-02-20 PROCEDURE — 99223 1ST HOSP IP/OBS HIGH 75: CPT | Performed by: PHYSICIAN ASSISTANT

## 2021-02-20 PROCEDURE — 81001 URINALYSIS AUTO W/SCOPE: CPT

## 2021-02-20 PROCEDURE — 2580000003 HC RX 258: Performed by: STUDENT IN AN ORGANIZED HEALTH CARE EDUCATION/TRAINING PROGRAM

## 2021-02-20 PROCEDURE — 82272 OCCULT BLD FECES 1-3 TESTS: CPT

## 2021-02-20 PROCEDURE — 96375 TX/PRO/DX INJ NEW DRUG ADDON: CPT

## 2021-02-20 PROCEDURE — 36415 COLL VENOUS BLD VENIPUNCTURE: CPT

## 2021-02-20 PROCEDURE — 6370000000 HC RX 637 (ALT 250 FOR IP): Performed by: STUDENT IN AN ORGANIZED HEALTH CARE EDUCATION/TRAINING PROGRAM

## 2021-02-20 PROCEDURE — 84484 ASSAY OF TROPONIN QUANT: CPT

## 2021-02-20 PROCEDURE — 2060000000 HC ICU INTERMEDIATE R&B

## 2021-02-20 PROCEDURE — 73130 X-RAY EXAM OF HAND: CPT

## 2021-02-20 PROCEDURE — 71046 X-RAY EXAM CHEST 2 VIEWS: CPT

## 2021-02-20 PROCEDURE — 85610 PROTHROMBIN TIME: CPT

## 2021-02-20 PROCEDURE — 80053 COMPREHEN METABOLIC PANEL: CPT

## 2021-02-20 PROCEDURE — 80307 DRUG TEST PRSMV CHEM ANLYZR: CPT

## 2021-02-20 PROCEDURE — 6360000002 HC RX W HCPCS: Performed by: STUDENT IN AN ORGANIZED HEALTH CARE EDUCATION/TRAINING PROGRAM

## 2021-02-20 PROCEDURE — 93005 ELECTROCARDIOGRAM TRACING: CPT | Performed by: STUDENT IN AN ORGANIZED HEALTH CARE EDUCATION/TRAINING PROGRAM

## 2021-02-20 PROCEDURE — 85025 COMPLETE CBC W/AUTO DIFF WBC: CPT

## 2021-02-20 PROCEDURE — 85730 THROMBOPLASTIN TIME PARTIAL: CPT

## 2021-02-20 PROCEDURE — 83880 ASSAY OF NATRIURETIC PEPTIDE: CPT

## 2021-02-20 PROCEDURE — 2580000003 HC RX 258: Performed by: PHYSICIAN ASSISTANT

## 2021-02-20 PROCEDURE — 99285 EMERGENCY DEPT VISIT HI MDM: CPT

## 2021-02-20 PROCEDURE — 96374 THER/PROPH/DIAG INJ IV PUSH: CPT

## 2021-02-20 PROCEDURE — 87635 SARS-COV-2 COVID-19 AMP PRB: CPT

## 2021-02-20 PROCEDURE — 93010 ELECTROCARDIOGRAM REPORT: CPT | Performed by: INTERNAL MEDICINE

## 2021-02-20 RX ORDER — FAMOTIDINE 20 MG/1
20 TABLET, FILM COATED ORAL 2 TIMES DAILY
Status: DISCONTINUED | OUTPATIENT
Start: 2021-02-20 | End: 2021-02-20

## 2021-02-20 RX ORDER — CARVEDILOL 3.12 MG/1
3.12 TABLET ORAL 2 TIMES DAILY WITH MEALS
Status: DISCONTINUED | OUTPATIENT
Start: 2021-02-21 | End: 2021-02-21

## 2021-02-20 RX ORDER — ATORVASTATIN CALCIUM 40 MG/1
40 TABLET, FILM COATED ORAL NIGHTLY
Status: DISCONTINUED | OUTPATIENT
Start: 2021-02-21 | End: 2021-02-22 | Stop reason: HOSPADM

## 2021-02-20 RX ORDER — LORAZEPAM 2 MG/ML
0.5 INJECTION INTRAMUSCULAR ONCE
Status: COMPLETED | OUTPATIENT
Start: 2021-02-20 | End: 2021-02-20

## 2021-02-20 RX ORDER — PROMETHAZINE HYDROCHLORIDE 25 MG/1
12.5 TABLET ORAL EVERY 6 HOURS PRN
Status: DISCONTINUED | OUTPATIENT
Start: 2021-02-20 | End: 2021-02-22 | Stop reason: HOSPADM

## 2021-02-20 RX ORDER — MORPHINE SULFATE 2 MG/ML
2 INJECTION, SOLUTION INTRAMUSCULAR; INTRAVENOUS ONCE
Status: COMPLETED | OUTPATIENT
Start: 2021-02-20 | End: 2021-02-20

## 2021-02-20 RX ORDER — SODIUM CHLORIDE 0.9 % (FLUSH) 0.9 %
10 SYRINGE (ML) INJECTION PRN
Status: DISCONTINUED | OUTPATIENT
Start: 2021-02-20 | End: 2021-02-22 | Stop reason: HOSPADM

## 2021-02-20 RX ORDER — SODIUM CHLORIDE 9 MG/ML
INJECTION, SOLUTION INTRAVENOUS CONTINUOUS
Status: DISCONTINUED | OUTPATIENT
Start: 2021-02-20 | End: 2021-02-22 | Stop reason: HOSPADM

## 2021-02-20 RX ORDER — 0.9 % SODIUM CHLORIDE 0.9 %
1000 INTRAVENOUS SOLUTION INTRAVENOUS ONCE
Status: COMPLETED | OUTPATIENT
Start: 2021-02-20 | End: 2021-02-20

## 2021-02-20 RX ORDER — ASPIRIN 81 MG/1
81 TABLET ORAL DAILY
Status: DISCONTINUED | OUTPATIENT
Start: 2021-02-21 | End: 2021-02-22 | Stop reason: HOSPADM

## 2021-02-20 RX ORDER — FENOFIBRATE 160 MG/1
160 TABLET ORAL DAILY
Status: DISCONTINUED | OUTPATIENT
Start: 2021-02-21 | End: 2021-02-22 | Stop reason: HOSPADM

## 2021-02-20 RX ORDER — ALBUTEROL SULFATE 2.5 MG/3ML
2.5 SOLUTION RESPIRATORY (INHALATION) EVERY 6 HOURS PRN
Status: DISCONTINUED | OUTPATIENT
Start: 2021-02-20 | End: 2021-02-22 | Stop reason: HOSPADM

## 2021-02-20 RX ORDER — FERROUS SULFATE 325(65) MG
325 TABLET ORAL 2 TIMES DAILY
Status: DISCONTINUED | OUTPATIENT
Start: 2021-02-20 | End: 2021-02-22 | Stop reason: HOSPADM

## 2021-02-20 RX ORDER — QUETIAPINE 300 MG/1
600 TABLET, FILM COATED, EXTENDED RELEASE ORAL NIGHTLY
Status: DISCONTINUED | OUTPATIENT
Start: 2021-02-21 | End: 2021-02-22 | Stop reason: HOSPADM

## 2021-02-20 RX ORDER — ASPIRIN 81 MG/1
324 TABLET, CHEWABLE ORAL ONCE
Status: COMPLETED | OUTPATIENT
Start: 2021-02-20 | End: 2021-02-20

## 2021-02-20 RX ORDER — ESCITALOPRAM OXALATE 20 MG/1
20 TABLET ORAL DAILY
Status: DISCONTINUED | OUTPATIENT
Start: 2021-02-21 | End: 2021-02-22 | Stop reason: HOSPADM

## 2021-02-20 RX ORDER — ACETAMINOPHEN 650 MG/1
650 SUPPOSITORY RECTAL EVERY 6 HOURS PRN
Status: DISCONTINUED | OUTPATIENT
Start: 2021-02-20 | End: 2021-02-22 | Stop reason: HOSPADM

## 2021-02-20 RX ORDER — ARIPIPRAZOLE 2 MG/1
2 TABLET ORAL DAILY
Status: DISCONTINUED | OUTPATIENT
Start: 2021-02-21 | End: 2021-02-22 | Stop reason: HOSPADM

## 2021-02-20 RX ORDER — ACETAMINOPHEN 325 MG/1
650 TABLET ORAL ONCE
Status: COMPLETED | OUTPATIENT
Start: 2021-02-20 | End: 2021-02-20

## 2021-02-20 RX ORDER — FOLIC ACID 1 MG/1
1 TABLET ORAL DAILY
Status: DISCONTINUED | OUTPATIENT
Start: 2021-02-21 | End: 2021-02-22 | Stop reason: HOSPADM

## 2021-02-20 RX ORDER — ONDANSETRON 2 MG/ML
4 INJECTION INTRAMUSCULAR; INTRAVENOUS EVERY 6 HOURS PRN
Status: DISCONTINUED | OUTPATIENT
Start: 2021-02-20 | End: 2021-02-22 | Stop reason: HOSPADM

## 2021-02-20 RX ORDER — CLOPIDOGREL BISULFATE 75 MG/1
75 TABLET ORAL DAILY
Status: DISCONTINUED | OUTPATIENT
Start: 2021-02-21 | End: 2021-02-22 | Stop reason: HOSPADM

## 2021-02-20 RX ORDER — NITROGLYCERIN 0.4 MG/1
0.4 TABLET SUBLINGUAL EVERY 5 MIN PRN
Status: DISCONTINUED | OUTPATIENT
Start: 2021-02-20 | End: 2021-02-22 | Stop reason: HOSPADM

## 2021-02-20 RX ORDER — ACETAMINOPHEN 325 MG/1
650 TABLET ORAL EVERY 6 HOURS PRN
Status: DISCONTINUED | OUTPATIENT
Start: 2021-02-20 | End: 2021-02-22 | Stop reason: HOSPADM

## 2021-02-20 RX ORDER — LEVOTHYROXINE SODIUM 0.07 MG/1
75 TABLET ORAL DAILY
Status: DISCONTINUED | OUTPATIENT
Start: 2021-02-21 | End: 2021-02-22 | Stop reason: HOSPADM

## 2021-02-20 RX ORDER — FLUTICASONE PROPIONATE 50 MCG
1 SPRAY, SUSPENSION (ML) NASAL 2 TIMES DAILY
Status: DISCONTINUED | OUTPATIENT
Start: 2021-02-21 | End: 2021-02-22 | Stop reason: HOSPADM

## 2021-02-20 RX ORDER — SODIUM CHLORIDE 0.9 % (FLUSH) 0.9 %
10 SYRINGE (ML) INJECTION EVERY 12 HOURS SCHEDULED
Status: DISCONTINUED | OUTPATIENT
Start: 2021-02-20 | End: 2021-02-22 | Stop reason: HOSPADM

## 2021-02-20 RX ADMIN — NITROGLYCERIN 0.4 MG: 0.4 TABLET, ORALLY DISINTEGRATING SUBLINGUAL at 17:24

## 2021-02-20 RX ADMIN — SODIUM CHLORIDE: 9 INJECTION, SOLUTION INTRAVENOUS at 23:43

## 2021-02-20 RX ADMIN — ASPIRIN 324 MG: 81 TABLET, CHEWABLE ORAL at 17:01

## 2021-02-20 RX ADMIN — ACETAMINOPHEN 650 MG: 325 TABLET ORAL at 18:43

## 2021-02-20 RX ADMIN — MORPHINE SULFATE 2 MG: 2 INJECTION, SOLUTION INTRAMUSCULAR; INTRAVENOUS at 20:46

## 2021-02-20 RX ADMIN — LORAZEPAM 0.5 MG: 2 INJECTION, SOLUTION INTRAMUSCULAR; INTRAVENOUS at 17:02

## 2021-02-20 RX ADMIN — NITROGLYCERIN 0.4 MG: 0.4 TABLET, ORALLY DISINTEGRATING SUBLINGUAL at 17:29

## 2021-02-20 RX ADMIN — NITROGLYCERIN 0.4 MG: 0.4 TABLET, ORALLY DISINTEGRATING SUBLINGUAL at 17:19

## 2021-02-20 RX ADMIN — SODIUM CHLORIDE 1000 ML: 9 INJECTION, SOLUTION INTRAVENOUS at 17:01

## 2021-02-20 ASSESSMENT — ENCOUNTER SYMPTOMS
NAUSEA: 1
VOMITING: 1
COLOR CHANGE: 0
ABDOMINAL DISTENTION: 0
CONSTIPATION: 1
EYE REDNESS: 0
ABDOMINAL PAIN: 0
EYE DISCHARGE: 0
CHEST TIGHTNESS: 0
SINUS PRESSURE: 0
SINUS PAIN: 0
DIARRHEA: 1
EYE ITCHING: 0
BLOOD IN STOOL: 1
SHORTNESS OF BREATH: 0
RHINORRHEA: 0

## 2021-02-20 ASSESSMENT — PAIN SCALES - GENERAL: PAINLEVEL_OUTOF10: 7

## 2021-02-20 ASSESSMENT — PAIN DESCRIPTION - LOCATION: LOCATION: CHEST

## 2021-02-20 ASSESSMENT — PAIN DESCRIPTION - FREQUENCY: FREQUENCY: CONTINUOUS

## 2021-02-20 ASSESSMENT — PAIN DESCRIPTION - ORIENTATION
ORIENTATION: MID
ORIENTATION: ANTERIOR;LEFT

## 2021-02-20 ASSESSMENT — PAIN DESCRIPTION - PAIN TYPE
TYPE: ACUTE PAIN
TYPE: ACUTE PAIN

## 2021-02-20 ASSESSMENT — PAIN DESCRIPTION - ONSET: ONSET: ON-GOING

## 2021-02-20 NOTE — ED PROVIDER NOTES
[place] ENCOUNTER          Pt Name: Kisha Samuels  MRN: [id number]  [place] 1955  Date of evaluation: 2/20/2021  Treating Resident Physician: Idalia Velásquez DO  Supervising Physician: Marlen Urias MD    CHIEF COMPLAINT       Chief Complaint   Patient presents with    Cough    Hematemesis    Nausea    Abdominal Pain     epigastric     History obtained from the patient. HISTORY OF PRESENT ILLNESS    Kisha Samuels is a 63 y.o. female who presents to the emergency department for evaluation of chest pain. She has a history of hypertension, COPD, diabetes, and cocaine use. She takes aspirin and Plavix. The patient states that her chest pain started approximately 2 weeks ago and is also reporting associated symptoms over the same duration of a cough productive of black/clear/mucopurulent sputum, and nausea and vomiting which is getting worse with some episodes of hematemesis. She has a history of GI bleeding and follows with Dr. Jose Collier. The patient is also reporting chills, shakes, and feeling clammy. She reports that due to her nausea and vomiting she has been noncompliant with her medications for the past 2 days. She additionally reports that she has noticed some blood in her stool and questionably had one melanotic stool over the past 2 weeks and states that she has a history of irritable bowel syndrome and has had episodes of diarrhea and constipation. The patient reports that she has used cocaine several times over the past 2 weeks, most recently yesterday, and that she smokes cocaine. She also reports that she injured her hand over the past two weeks after it got stuck in her chair at home and is complaining of pain, bruising, and decreased range of motion to her left hand.  She also reports falling onto her right knee and having right knee pain, however that she is still able to ambulate and her knee pain is similar to her chronic knee pain. The patient has no other acute complaints at this time. REVIEW OF SYSTEMS   Review of Systems   Constitutional: Negative for fever. HENT: Negative for rhinorrhea, sinus pressure and sinus pain. Eyes: Negative for discharge, redness and itching. Respiratory: Negative for chest tightness and shortness of breath. Cardiovascular: Positive for chest pain. Gastrointestinal: Positive for blood in stool, constipation, diarrhea, nausea and vomiting. Negative for abdominal distention and abdominal pain. Genitourinary: Negative for difficulty urinating, dysuria, frequency, hematuria and urgency. Musculoskeletal: Negative for arthralgias. Skin: Negative for color change and pallor. Neurological: Negative for dizziness, light-headedness and headaches.          PAST MEDICAL AND SURGICAL HISTORY     Past Medical History:   Diagnosis Date    Acute renal failure with acute cortical necrosis (HCC) 12/21/2019    Allergic rhinitis     Arthritis     spine    Bipolar 1 disorder ([address]     Bipolar disorder ([address]     Blood circulation, collateral     Cancer ([address] 2011    cancerous polyps removed - Dr. Cortes January Colon polyps     COPD (chronic obstructive pulmonary disease) ([address] 7/24/2014    Coronary vasospasm ([address] 8/17/2019    Depression     Diverticulitis of colon     GERD (gastroesophageal reflux disease)     Hiatal hernia     History of arterial ischemic stroke 7/20/2019    History of colonoscopy 2002    History of kidney stones     Hx of blood clots 6/17/2014    PE and collar bone area after shoulder surgery    Hyperlipidemia     Hypertension     Liver disease     enlarged liver - damaged with alcohol in past but no cirrhosis per patient    Pneumonia 7/24/2014    Suicidal thoughts     2015 admitted to  from [facility]    Thyroid disease     Type 2 diabetes mellitus without complication, without long-term current use of insulin (HCC) 7/20/2019    Vomiting     Types: Cocaine     Comment: crack today         ALLERGIES     Allergies   Allergen Reactions    Seasonal Other (See Comments)     sneezing         FAMILY HISTORY     Family History   Problem Relation Age of Onset    Cancer Mother     Heart Disease Mother     High Blood Pressure Mother     High Cholesterol Mother     Cancer Father         brain    Depression Father     Heart Disease Father     High Cholesterol Father     Mental Illness Father     Cancer Sister     Heart Attack Sister     Heart Disease Maternal Uncle     High Cholesterol Maternal Uncle     Diabetes Maternal Grandmother     Heart Disease Maternal Grandmother     High Cholesterol Maternal Grandmother     Early Death Maternal Grandfather     Heart Disease Maternal Grandfather     High Cholesterol Maternal Grandfather     Stroke Maternal Grandfather     Heart Disease Paternal Grandmother     High Cholesterol Paternal Grandmother     Heart Disease Paternal Grandfather     High Cholesterol Paternal Grandfather          PREVIOUS RECORDS   Previous records reviewed: she had a cardiac catheterization in 2017 which was negative for CAD and a stress test in 2019 which was negative for significant perfusion abnormalities. PHYSICAL EXAM     ED Triage Vitals   BP Temp Temp src Pulse Resp SpO2 Height Weight   123/90 99F -- 68 18 94% 5'4'' 148lb     Initial vital signs and nursing assessment reviewed and normal. Body mass index is 25.4 kg/m². Pulsoximetry is normal per my interpretation. Additional Vital Signs:  Vitals:    02/20/21 2330   BP: 95/60   Pulse: 62   Resp: 19   Temp: 98.7 °F (37.1 °C)   SpO2: 95%       Physical Exam  Vitals signs and nursing note reviewed. Constitutional:       Appearance: Normal appearance. She is normal weight. She is not ill-appearing. HENT:      Head: Normocephalic and atraumatic. Nose: Nose normal. No congestion or rhinorrhea.       Mouth/Throat:      Mouth: Mucous membranes are moist. Pharynx: Oropharynx is clear. No oropharyngeal exudate or posterior oropharyngeal erythema. Eyes:      Extraocular Movements: Extraocular movements intact. Pupils: Pupils are equal, round, and reactive to light. Neck:      Musculoskeletal: Normal range of motion and neck supple. Cardiovascular:      Rate and Rhythm: Normal rate and regular rhythm. Pulses: Normal pulses. Heart sounds: Murmur present. Comments: Systolic murmur present  Pulmonary:      Effort: Pulmonary effort is normal.      Breath sounds: Normal breath sounds. Abdominal:      General: Abdomen is flat. There is no distension. Palpations: Abdomen is soft. Tenderness: There is abdominal tenderness. Comments: Abdomen exhibits mild tenderness to palpation in the epigastric area and left upper quadrant with no peritoneal signs. Genitourinary:     Rectum: Normal.      Comments: Rectal exam performed to obtain occult blood stool screen. No rectal vault tenderness or palpable hemorrhoids. Brown stool noted. Exam performed with the patient's nurse, Tereso Butterfield, at bedside as chaperone. Musculoskeletal:         General: Signs of injury present. No swelling or tenderness. Comments: There is bruising, tenderness, and decreased range of motion to the left hand and wrist joint due to pain. The right knee exhibits full range of motion and no joint tenderness or laxity. Skin:     General: Skin is warm and dry. Neurological:      General: No focal deficit present. Mental Status: She is alert and oriented to person, place, and time. Mental status is at baseline.    Psychiatric:         Mood and Affect: Mood normal.         Behavior: Behavior normal.         EKG Interpretation: 2/20/2021 1613    Interpreted by emergency department physician    Rhythm: normal sinus   Rate: 69  Axis: normal  Ectopy: none  Conduction: normal  ST Segments: normal  T Waves: normal  Q Waves: lateral leads and inferior leads    Clinical Impression: NSR at a rate of [address]     EKG Interpretation: 2/20/2021 2053    Interpreted by emergency department physician    Rhythm: normal sinus   Rate: 66  Axis: left  Ectopy: none  Conduction: normal  ST Segments: normal  T Waves: no acute change  Q Waves: lateral leads and inferior leads    Clinical Impression: no acute changes    Hieu Ortiz        MEDICAL DECISION MAKING   Initial Assessment:   1. Chest pain, high risk with recent cocaine use  2. Hematemesis. Plan:    Labs   EKG, troponin   IV, 1L NS bolus   ASA, NTG, Ativan for chest pain   Occult blood stool screen.         ED RESULTS   Laboratory results:  Labs Reviewed   CBC WITH AUTO DIFFERENTIAL - Abnormal; Notable for the following components:       Result Value    RBC 4.11 (*)     Hemoglobin 11.8 (*)     MCHC 31.8 (*)     All other components within normal limits   ANION GAP - Abnormal; Notable for the following components:    Anion Gap 6.0 (*)     All other components within normal limits   OSMOLALITY - Abnormal; Notable for the following components:    Osmolality Calc 268.4 (*)     All other components within normal limits   GLOMERULAR FILTRATION RATE, ESTIMATED - Abnormal; Notable for the following components:    Est, Glom Filt Rate 63 (*)     All other components within normal limits   MICROSCOPIC URINALYSIS - Abnormal; Notable for the following components:    Leukocytes, UA SMALL (*)     All other components within normal limits   PROCALCITONIN - Abnormal; Notable for the following components:    Procalcitonin 0.10 (*)     All other components within normal limits   COVID-19, RAPID   CULTURE, MRSA, SCREENING   VRE SCREEN BY PCR   COMPREHENSIVE METABOLIC PANEL W/ REFLEX TO MG FOR LOW K   TROPONIN   BRAIN NATRIURETIC PEPTIDE   BLOOD OCCULT STOOL SCREEN #1   PROTIME-INR   URINE DRUG SCREEN   APTT   TROPONIN   APTT   TROPONIN   HEMOGLOBIN AND HEMATOCRIT, BLOOD   TROPONIN   TROPONIN   MRSA BY PCR   CBC WITH AUTO DIFFERENTIAL       Radiologic studies results:  XR WRIST LEFT (MIN 3 VIEWS)   Final Result   Soft tissue swelling no fracture            **This report has been created using voice recognition software. It may contain minor errors which are inherent in voice recognition technology. **      Final report electronically signed by Dr. Ade Bell on 2/20/2021 5:37 PM      XR HAND LEFT (MIN 3 VIEWS)   Final Result   No acute osseous abnormality            **This report has been created using voice recognition software. It may contain minor errors which are inherent in voice recognition technology. **      Final report electronically signed by Dr. Ade Bell on 2/20/2021 5:36 PM      XR CHEST (2 VW)   Final Result   Stable radiographic appearance of the chest. No evidence of an acute process. **This report has been created using voice recognition software. It may contain minor errors which are inherent in voice recognition technology. **      Final report electronically signed by Dr. Ade Bell on 2/20/2021 5:38 PM          ED Medications administered this visit:   Medications   nitroGLYCERIN (NITROSTAT) SL tablet 0.4 mg (0.4 mg Sublingual Given 2/20/21 1729)   0.9 % sodium chloride infusion ( Intravenous New Bag 2/20/21 2343)   sodium chloride flush 0.9 % injection 10 mL (0 mLs Intravenous Held 2/21/21 0050)   sodium chloride flush 0.9 % injection 10 mL (has no administration in time range)   promethazine (PHENERGAN) tablet 12.5 mg (has no administration in time range)     Or   ondansetron (ZOFRAN) injection 4 mg (has no administration in time range)   acetaminophen (TYLENOL) tablet 650 mg (has no administration in time range)     Or   acetaminophen (TYLENOL) suppository 650 mg (has no administration in time range)   albuterol (PROVENTIL) nebulizer solution 2.5 mg (has no administration in time range)   ARIPiprazole (ABILIFY) tablet 2 mg (has no administration in time range)   aspirin EC tablet 81 mg ( Oral Automatically Held 2/24/21 0900)   atorvastatin (LIPITOR) tablet 40 mg (40 mg Oral Not Given 2/21/21 0136)   clopidogrel (PLAVIX) tablet 75 mg ( Oral Automatically Held 2/24/21 0900)   escitalopram (LEXAPRO) tablet 20 mg (has no administration in time range)   fenofibrate (TRIGLIDE) tablet 160 mg (has no administration in time range)   ferrous sulfate (IRON 325) tablet 325 mg ( Oral Automatically Held 2/26/21 2100)   fluticasone (FLONASE) 50 MCG/ACT nasal spray 1 spray (1 spray Each Nostril Not Given 9/12/81 1690)   folic acid (FOLVITE) tablet 1 mg (has no administration in time range)   levothyroxine (SYNTHROID) tablet 75 mcg (has no administration in time range)   QUEtiapine (SEROQUEL XR) extended release tablet 600 mg (600 mg Oral Not Given 2/21/21 0136)   Roflumilast (DALIRESP) tablet 500 mcg (has no administration in time range)   tiotropium (SPIRIVA RESPIMAT) 2.5 MCG/ACT inhaler 1 puff (has no administration in time range)   traZODone (DESYREL) tablet 150 mg (150 mg Oral Not Given 2/21/21 0136)   pantoprazole (PROTONIX) 80 mg in sodium chloride 0.9 % 100 mL infusion (8 mg/hr Intravenous New Bag 2/21/21 0020)   0.9 % sodium chloride bolus (0 mLs Intravenous Stopped 2/20/21 1730)   aspirin chewable tablet 324 mg (324 mg Oral Given 2/20/21 1701)   LORazepam (ATIVAN) injection 0.5 mg (0.5 mg Intravenous Given 2/20/21 1702)   acetaminophen (TYLENOL) tablet 650 mg (650 mg Oral Given 2/20/21 1843)   morphine (PF) injection 2 mg (2 mg Intravenous Given 2/20/21 2046)   aluminum & magnesium hydroxide-simethicone (MAALOX) 30 mL, lidocaine viscous hcl (XYLOCAINE) 5 mL (GI COCKTAIL) ( Oral Given 2/21/21 0016)   pantoprazole (PROTONIX) 80 mg in sodium chloride 0.9 % 50 mL bolus (0 mg Intravenous Stopped 2/21/21 0051)         ED COURSE     ED Course as of Feb 21 0235   Sat Feb 20, 2021   1851 Patient reporting that chest pain did not improve after ASA, NTG, and ativan.     [DO]      ED Course User Index  [DO] Litzy Vu Nora Landa,          MEDICATION CHANGES     Current Discharge Medication List            FINAL DISPOSITION   Work-up in the emergency department remarkable for a CBC and CMP which were grossly within normal limits and an EKG and troponin which were not suggestive of ACS. Patient had a repeat EKG and troponin III hours apart which were also negative. COVID-19 test negative. Cocaine positive on UDS. Blood occult stool screen negative. The patient was given a full dose aspirin, nitroglycerin as needed, and Ativan in the emergency department with no improvement in her chest pain. The patient was observed to have nausea or vomiting in the emergency department. The patient was subsequently given 2 mg of morphine and provided with a box lunch. Upon reassessment after box lunch the patient was observed to be sleeping in bed and in no acute distress. Upon waking the patient stated that she still felt chest pain. Given the patient's history of cocaine use will plan to admit the patient to the hospital for further evaluation of her chest pain. I discussed the test results and the treatment plan with the patient who verbalized understanding and was agreeable for admission to the hospital.      This case was discussed with the admitting hospitalist, Dr. Savanna Martinez, who agreed to accept the patient for admission to the hospital and recommended giving the patient a GI cocktail and to start the patient on a Protonix bolus and infusion. GI cocktail was given but Protonix was deferred in the emergency department. The patient was subsequently admitted to the hospital.      Final diagnoses:   Chest pain, unspecified type   Nondependent cocaine abuse ([facility]     Condition: condition: stable  Dispo: Admit to telemetry      This transcription was electronically signed.  Parts of this transcriptions may have been dictated by use of voice recognition software and electronically transcribed, and parts may have been transcribed with

## 2021-02-20 NOTE — ED NOTES
Upon first contact with patient this RN receives bedside shift report by Trish Teixeira. Pt updated on plan of care, denies need to urinate at this time. Will continue to monitor.      Dinah Mondragon RN  02/20/21 [id number]

## 2021-02-20 NOTE — ED NOTES
Patient presents to ED via EMS with complaint of cough, vomiting blood and epigastric pain. Patient ambulates from EMS cart to ED cart. Patient gowned, EKG obtained. Dr. George Lockett at bedside.      Ramana Michelle RN  02/20/21 [id number]

## 2021-02-20 NOTE — ED NOTES
Dr. Marlene Nissen and Dr. Davie Angela at bedside for ultrasound.      Adarsh Dover RN  02/20/21 [id number]

## 2021-02-20 NOTE — ED NOTES
Pt produced urine sample. Pt resting in bed watching television. Pt denies need at this time.    Juliane Ta  02/20/21 Elie Flores RN  02/20/21 Elie Flores RN  02/20/21 [id number]

## 2021-02-20 NOTE — ED NOTES
Bed: 030A  Expected date: 2/20/21  Expected time: 4:01 PM  Means of arrival: [facility]  Comments:     Cynthia Adams RN  02/20/21 [id number]

## 2021-02-21 LAB
BASOPHILS # BLD: 0.7 %
BASOPHILS ABSOLUTE: 0 THOU/MM3 (ref 0–0.1)
EKG ATRIAL RATE: 66 BPM
EKG P AXIS: 62 DEGREES
EKG P-R INTERVAL: 144 MS
EKG Q-T INTERVAL: 424 MS
EKG QRS DURATION: 86 MS
EKG QTC CALCULATION (BAZETT): 444 MS
EKG R AXIS: 6 DEGREES
EKG T AXIS: 32 DEGREES
EKG VENTRICULAR RATE: 66 BPM
EOSINOPHIL # BLD: 0.7 %
EOSINOPHILS ABSOLUTE: 0 THOU/MM3 (ref 0–0.4)
ERYTHROCYTE [DISTWIDTH] IN BLOOD BY AUTOMATED COUNT: 13.1 % (ref 11.5–14.5)
ERYTHROCYTE [DISTWIDTH] IN BLOOD BY AUTOMATED COUNT: 42.7 FL (ref 35–45)
FERRITIN: 309 NG/ML (ref 10–291)
FOLATE: > 20 NG/ML (ref 4.8–24.2)
HCT VFR BLD CALC: 34.3 % (ref 37–47)
HCT VFR BLD CALC: 35.1 % (ref 37–47)
HCT VFR BLD CALC: 35.5 % (ref 37–47)
HEMOGLOBIN: 10.8 GM/DL (ref 12–16)
HEMOGLOBIN: 11 GM/DL (ref 12–16)
HEMOGLOBIN: 11.4 GM/DL (ref 12–16)
IMMATURE GRANS (ABS): 0.01 THOU/MM3 (ref 0–0.07)
IMMATURE GRANULOCYTES: 0.2 %
IRON SATURATION: 12 % (ref 20–50)
IRON: 31 UG/DL (ref 50–170)
LYMPHOCYTES # BLD: 40.6 %
LYMPHOCYTES ABSOLUTE: 2.2 THOU/MM3 (ref 1–4.8)
MCH RBC QN AUTO: 28.4 PG (ref 26–33)
MCHC RBC AUTO-ENTMCNC: 31.3 GM/DL (ref 32.2–35.5)
MCV RBC AUTO: 90.5 FL (ref 81–99)
MONOCYTES # BLD: 9.2 %
MONOCYTES ABSOLUTE: 0.5 THOU/MM3 (ref 0.4–1.3)
MRSA SCREEN RT-PCR: POSITIVE
NUCLEATED RED BLOOD CELLS: 0 /100 WBC
PLATELET # BLD: 217 THOU/MM3 (ref 130–400)
PMV BLD AUTO: 10 FL (ref 9.4–12.4)
PROCALCITONIN: 0.1 NG/ML (ref 0.01–0.09)
RBC # BLD: 3.88 MILL/MM3 (ref 4.2–5.4)
SEG NEUTROPHILS: 48.6 %
SEGMENTED NEUTROPHILS ABSOLUTE COUNT: 2.6 THOU/MM3 (ref 1.8–7.7)
TOTAL IRON BINDING CAPACITY: 263 UG/DL (ref 171–450)
TROPONIN T: < 0.01 NG/ML
VANCOMYCIN RESISTANT ENTEROCOCCUS: NEGATIVE
VITAMIN B-12: 225 PG/ML (ref 211–911)
WBC # BLD: 5.4 THOU/MM3 (ref 4.8–10.8)

## 2021-02-21 PROCEDURE — 85025 COMPLETE CBC W/AUTO DIFF WBC: CPT

## 2021-02-21 PROCEDURE — 36415 COLL VENOUS BLD VENIPUNCTURE: CPT

## 2021-02-21 PROCEDURE — 85014 HEMATOCRIT: CPT

## 2021-02-21 PROCEDURE — C9113 INJ PANTOPRAZOLE SODIUM, VIA: HCPCS | Performed by: NURSE PRACTITIONER

## 2021-02-21 PROCEDURE — 87500 VANOMYCIN DNA AMP PROBE: CPT

## 2021-02-21 PROCEDURE — 93010 ELECTROCARDIOGRAM REPORT: CPT | Performed by: INTERNAL MEDICINE

## 2021-02-21 PROCEDURE — 84145 PROCALCITONIN (PCT): CPT

## 2021-02-21 PROCEDURE — 6370000000 HC RX 637 (ALT 250 FOR IP): Performed by: PHYSICIAN ASSISTANT

## 2021-02-21 PROCEDURE — 2580000003 HC RX 258: Performed by: PHYSICIAN ASSISTANT

## 2021-02-21 PROCEDURE — 1200000003 HC TELEMETRY R&B

## 2021-02-21 PROCEDURE — 83550 IRON BINDING TEST: CPT

## 2021-02-21 PROCEDURE — 84484 ASSAY OF TROPONIN QUANT: CPT

## 2021-02-21 PROCEDURE — 83540 ASSAY OF IRON: CPT

## 2021-02-21 PROCEDURE — 82728 ASSAY OF FERRITIN: CPT

## 2021-02-21 PROCEDURE — C9113 INJ PANTOPRAZOLE SODIUM, VIA: HCPCS | Performed by: PHYSICIAN ASSISTANT

## 2021-02-21 PROCEDURE — 82607 VITAMIN B-12: CPT

## 2021-02-21 PROCEDURE — 94760 N-INVAS EAR/PLS OXIMETRY 1: CPT

## 2021-02-21 PROCEDURE — 87641 MR-STAPH DNA AMP PROBE: CPT

## 2021-02-21 PROCEDURE — 99232 SBSQ HOSP IP/OBS MODERATE 35: CPT | Performed by: INTERNAL MEDICINE

## 2021-02-21 PROCEDURE — 94640 AIRWAY INHALATION TREATMENT: CPT

## 2021-02-21 PROCEDURE — 85018 HEMOGLOBIN: CPT

## 2021-02-21 PROCEDURE — 82746 ASSAY OF FOLIC ACID SERUM: CPT

## 2021-02-21 PROCEDURE — 6360000002 HC RX W HCPCS: Performed by: NURSE PRACTITIONER

## 2021-02-21 PROCEDURE — 87081 CULTURE SCREEN ONLY: CPT

## 2021-02-21 PROCEDURE — 6360000002 HC RX W HCPCS: Performed by: PHYSICIAN ASSISTANT

## 2021-02-21 PROCEDURE — 6370000000 HC RX 637 (ALT 250 FOR IP): Performed by: STUDENT IN AN ORGANIZED HEALTH CARE EDUCATION/TRAINING PROGRAM

## 2021-02-21 RX ORDER — QUETIAPINE FUMARATE 200 MG/1
200 TABLET, FILM COATED ORAL DAILY
COMMUNITY
End: 2022-05-09

## 2021-02-21 RX ORDER — PANTOPRAZOLE SODIUM 40 MG/10ML
40 INJECTION, POWDER, LYOPHILIZED, FOR SOLUTION INTRAVENOUS 2 TIMES DAILY
Status: DISCONTINUED | OUTPATIENT
Start: 2021-02-21 | End: 2021-02-22 | Stop reason: HOSPADM

## 2021-02-21 RX ADMIN — FLUTICASONE PROPIONATE 1 SPRAY: 50 SPRAY, METERED NASAL at 23:20

## 2021-02-21 RX ADMIN — TRAZODONE HYDROCHLORIDE 150 MG: 50 TABLET ORAL at 21:34

## 2021-02-21 RX ADMIN — SODIUM CHLORIDE, PRESERVATIVE FREE 10 ML: 5 INJECTION INTRAVENOUS at 21:33

## 2021-02-21 RX ADMIN — SODIUM CHLORIDE 8 MG/HR: 9 INJECTION, SOLUTION INTRAVENOUS at 00:20

## 2021-02-21 RX ADMIN — FENOFIBRATE 160 MG: 160 TABLET ORAL at 08:31

## 2021-02-21 RX ADMIN — LEVOTHYROXINE SODIUM 75 MCG: 0.07 TABLET ORAL at 08:30

## 2021-02-21 RX ADMIN — ESCITALOPRAM 20 MG: 20 TABLET, FILM COATED ORAL at 08:30

## 2021-02-21 RX ADMIN — PANTOPRAZOLE SODIUM 40 MG: 40 INJECTION, POWDER, FOR SOLUTION INTRAVENOUS at 21:34

## 2021-02-21 RX ADMIN — ARIPIPRAZOLE 2 MG: 2 TABLET ORAL at 08:30

## 2021-02-21 RX ADMIN — ROFLUMILAST 500 MCG: 500 TABLET ORAL at 08:30

## 2021-02-21 RX ADMIN — TIOTROPIUM BROMIDE INHALATION SPRAY 1 PUFF: 3.12 SPRAY, METERED RESPIRATORY (INHALATION) at 07:24

## 2021-02-21 RX ADMIN — SODIUM CHLORIDE: 9 INJECTION, SOLUTION INTRAVENOUS at 12:02

## 2021-02-21 RX ADMIN — ACETAMINOPHEN 650 MG: 325 TABLET ORAL at 17:23

## 2021-02-21 RX ADMIN — ACETAMINOPHEN 650 MG: 325 TABLET ORAL at 23:20

## 2021-02-21 RX ADMIN — QUETIAPINE FUMARATE 600 MG: 300 TABLET, EXTENDED RELEASE ORAL at 23:20

## 2021-02-21 RX ADMIN — LIDOCAINE HYDROCHLORIDE: 20 SOLUTION ORAL; TOPICAL at 00:16

## 2021-02-21 RX ADMIN — ACETAMINOPHEN 650 MG: 325 TABLET ORAL at 11:20

## 2021-02-21 RX ADMIN — ATORVASTATIN CALCIUM 40 MG: 40 TABLET, FILM COATED ORAL at 23:20

## 2021-02-21 RX ADMIN — ACETAMINOPHEN 650 MG: 325 TABLET ORAL at 03:12

## 2021-02-21 RX ADMIN — FOLIC ACID 1 MG: 1 TABLET ORAL at 08:30

## 2021-02-21 RX ADMIN — TRAZODONE HYDROCHLORIDE 150 MG: 50 TABLET ORAL at 03:16

## 2021-02-21 RX ADMIN — SODIUM CHLORIDE 80 MG: 9 INJECTION, SOLUTION INTRAVENOUS at 00:21

## 2021-02-21 ASSESSMENT — PAIN DESCRIPTION - ONSET: ONSET: ON-GOING

## 2021-02-21 ASSESSMENT — PAIN SCALES - GENERAL
PAINLEVEL_OUTOF10: 1
PAINLEVEL_OUTOF10: 0
PAINLEVEL_OUTOF10: 8
PAINLEVEL_OUTOF10: 8

## 2021-02-21 ASSESSMENT — PAIN - FUNCTIONAL ASSESSMENT: PAIN_FUNCTIONAL_ASSESSMENT: ACTIVITIES ARE NOT PREVENTED

## 2021-02-21 ASSESSMENT — PAIN DESCRIPTION - DESCRIPTORS: DESCRIPTORS: ACHING

## 2021-02-21 ASSESSMENT — PAIN DESCRIPTION - LOCATION
LOCATION: ABDOMEN
LOCATION: UMBILICUS

## 2021-02-21 ASSESSMENT — PAIN SCALES - WONG BAKER: WONGBAKER_NUMERICALRESPONSE: 0

## 2021-02-21 ASSESSMENT — PAIN DESCRIPTION - PROGRESSION: CLINICAL_PROGRESSION: NOT CHANGED

## 2021-02-21 NOTE — PROGRESS NOTES
Hospitalist Progress Note      Patient:  Elizabeth Bernstein    Unit/Bed:4K-13/013-A  YOB: 1955  MRN: [id number]   Acct: [de-identified]   PCP: No primary care provider on file. Date of Admission: 2/20/2021    Assessment and Plan:        1. Hematemesis / Melena / Hx of UGIB: differential: PUD/GERD, fahad-kay tear  a. EGD 2019 with esophageal dilation for stricture. -SLP to see  b. PTT/INR wnl. hgb 11.8 (baseline 14), MCV 90, plt 227.   c. Received 325mg ASA by ED provider. d. PPI bolus and drip. IVF. hgb q6 prn, transfuse for hgb < 7. Hold DAPT. Telemetry. Consult GI.  NPO. Pending SLP eval.  Document all vomit/BM for blood. 2. Acute Chest Pain / Hx of MI (cocaine-induced) / Nonobstructive CAD (per [facility] 2017): likely 2/2 GERD/hematemesis/ ? PUD            a. Chest pain burning in nature. Not improved with nitro in the ED, not worsened by exertion. b. Troponin neg x2. EKG shows NSR with (old) q-waves in II/III/aVF, V5-V6    c. Received 324 mg ASA in the ED. AVOID BB with chronic cocaine use  d. Trend x1 to r/o ACS with recent cocaine use yesterday. -Thus far negative. 3. Hx GERD: noted. PPI as above. 4. Essential Hypertension, controlled: Hold/discontinue BB due to chronic (since 1980) cocaine use. Will start alternative anti-hypertensive if needed. 5. Hypothyroidism: resume synthroid. 6. Bipolar I / Insomnia: resume lexapro, abilify, seroquel (unclear why on 2 antipsychotics), trazodone   7. COPD (without baseline supplemental O2 use), without exacerbation: resume daliresp, tiotropium, PRN albuterol. 8. Hx CVA (2014): hold DAPT as above. 9. Hx provoked PE (2014): noted. Hold DAPT as above. 10. NIDDM2, controlled: hold metformin. Monitor daily. Goal -180 while hospitalized. Consider SSI if hyperglycemic  11. HLD: resume statin / Fenofibrate.    12. Chronic Cocaine Use: consult addiction services as patient has an interest in quitting. Avoid BB    Chief Complaint: Hematemesis    Initial H and P:-    \" Jonah Wray is a 63year old white female smoker with PMH of cocaine use, bipolar 1, COPD, GERD, CVA (2019), provoked PE (2014) after shoulder surgery, HLD, HTN, NIDDM2, hypothyroidism who presented to [facility] ED on 2/20/21 c/o nonbilious vomit containing food for 2 weeks. She notes that at the end of the day she has been having significant hematemesis of dark red blood when there is nothing left in her stomach left to vomit. She notes one day of melena with formed stool. She notes 6/10 burning epigastric and midline chest pain that worsens with vomiting, nonexertional, did not improve with nitro, alleviated with morphine. She states that coughing fits have been what has been precipitating her vomiting. She has COPD and has been coughing until she gags. She denies increased sputum production or inc SOB, fever, chills.      In the ED, vitals showed: HR 68, /90, RR 18, 94% on RA and afebrile with Tmax 99. Labs showed: GFR 63, troponin neg x2, BNP wnl, LFTs wnl, Urine tox positive for cocaine, hgb 11.8, MCV 90.3, plt 227, wbc 5.8, INR 1.12, PTT 32.9. UA clean. CXR shows no acute process. \"    Subjective (past 24 hours):   Patient's nausea and vomiting currently controlled. No issues overnight. Rapid Covid testing is negative. Transfer orders out of stepdown/4K unit placed for continued care on [facility]. Chest pain is resolved since patient's nausea has been controlled. Serial troponins negative      Past medical history, family history, social history and allergies reviewed again and is unchanged since admission. ROS (12 point review of systems completed. Pertinent positives noted.  Otherwise ROS is negative)     Medications:  Reviewed    Infusion Medications    sodium chloride 75 mL/hr at 02/20/21 2343    pantoprozole (PROTONIX) infusion 8 mg/hr (02/21/21 0020)     Scheduled Medications    sodium chloride flush  10 mL Intravenous 2 times per day    ARIPiprazole  2 mg Oral Daily    [Held by provider] aspirin  81 mg Oral Daily    atorvastatin  40 mg Oral Nightly    [Held by provider] clopidogrel  75 mg Oral Daily    escitalopram  20 mg Oral Daily    fenofibrate  160 mg Oral Daily    [Held by provider] ferrous sulfate  325 mg Oral BID    fluticasone  1 spray Each Nostril BID    folic acid  1 mg Oral Daily    levothyroxine  75 mcg Oral Daily    QUEtiapine  600 mg Oral Nightly    Roflumilast  500 mcg Oral Daily    tiotropium  1 puff Inhalation Daily    traZODone  150 mg Oral Nightly     PRN Meds: nitroGLYCERIN, sodium chloride flush, promethazine **OR** ondansetron, acetaminophen **OR** acetaminophen, albuterol      Intake/Output Summary (Last 24 hours) at 2/21/2021 4476  Last data filed at 2/21/2021 0302  Gross per 24 hour   Intake 364.69 ml   Output --   Net 364.69 ml       Diet:  Diet NPO Effective Now    Exam:  BP 95/60   Pulse 67   Temp 98 °F (36.7 °C) (Oral)   Resp 16   Ht 5' 4\" (1.626 m)   Wt 151 lb 3.2 oz (68.6 kg)   SpO2 92%   BMI 25.95 kg/m²   General appearance: No apparent distress, appears stated age and cooperative. HEENT: Pupils equal, round, and reactive to light. Conjunctivae/corneas clear. Neck: Supple, with full range of motion. No jugular venous distention. Trachea midline. Respiratory:  Normal respiratory effort. Clear to auscultation, bilaterally without Rales/Wheezes/Rhonchi. Cardiovascular: Regular rate and rhythm with normal S1/S2 without murmurs, rubs or gallops. Abdomen: Soft, non-tender, non-distended with normal bowel sounds. Musculoskeletal: passive and active ROM x 4 extremities. Skin: Skin color, texture, turgor normal.  No rashes or lesions. Neurologic:  Neurovascularly intact without any focal sensory/motor deficits.  Cranial nerves: II-XII intact, grossly non-focal.  Psychiatric: Alert and oriented, thought content appropriate, normal insight  Capillary Refill: Brisk,< 3 seconds   Peripheral Pulses: +2 palpable, equal bilaterally     Labs:   Recent Labs     02/20/21  1725   WBC 5.8   HGB 11.8*   HCT 37.1        Recent Labs     02/20/21  1725      K 4.1      CO2 25   BUN 9   CREATININE 0.9   CALCIUM 9.5     Recent Labs     02/20/21  1725   AST 13   ALT 12   BILITOT 0.4   ALKPHOS 54     Recent Labs     02/20/21  1725   INR 1.12     No results for input(s): Seng Beltrán in the last 72 hours. Microbiology:    Blood culture #1:   Lab Results   Component Value Date    BC No growth-preliminary No growth  12/21/2019       Blood culture #2:No results found for: Lisbethmiguel Victoriano    Organism:  Lab Results   Component Value Date    ORG Proteus mirabilis 11/09/2020         Lab Results   Component Value Date    LABGRAM  04/22/2016     No segmented neutrophils observed. Rare epithelial cells observed. No organisms observed. MRSA culture only:No results found for: [facility]    Urine culture:   Lab Results   Component Value Date    LABURIN [place] count: 10,000-50,000 CFU/mL  07/29/2020       Respiratory culture: No results found for: CULTRESP    Aerobic and Anaerobic :  Lab Results   Component Value Date    LABAERO  04/22/2016     Culture yielded moderate growth of Staphylococcus (coagulase  negative). Growth appears consistent with surface yu when viewed  together with direct gram stain smear showing no segmented  neutrophils. No results found for: LABANAE    Urinalysis:      Lab Results   Component Value Date    NITRU NEGATIVE 02/20/2021    WBCUA 15-25 02/20/2021    WBCUA 0-2 04/19/2012    BACTERIA NONE SEEN 02/20/2021    RBCUA 3-5 02/20/2021    BLOODU NEGATIVE 02/20/2021    SPECGRAV 1.012 02/20/2021    GLUCOSEU NEGATIVE 11/09/2020       Radiology:  XR WRIST LEFT (MIN 3 VIEWS)   Final Result   Soft tissue swelling no fracture            **This report has been created using voice recognition software.   It may contain minor errors which are inherent in voice recognition technology. **      Final report electronically signed by Dr. Narayan Campos on 2/20/2021 5:37 PM      XR HAND LEFT (MIN 3 VIEWS)   Final Result   No acute osseous abnormality            **This report has been created using voice recognition software. It may contain minor errors which are inherent in voice recognition technology. **      Final report electronically signed by Dr. Narayan Campos on 2/20/2021 5:36 PM      XR CHEST (2 VW)   Final Result   Stable radiographic appearance of the chest. No evidence of an acute process. **This report has been created using voice recognition software. It may contain minor errors which are inherent in voice recognition technology. **      Final report electronically signed by Dr. Narayan Campos on 2/20/2021 5:38 PM        Xr Chest (2 Vw)    Result Date: 2/20/2021  PROCEDURE: XR CHEST (2 VW) CLINICAL INFORMATION: Chest pain. COMPARISON: 5/30/2020 TECHNIQUE: PA and lateral views the chest. FINDINGS: The heart size is normal.  The mediastinum is not widened. No infiltrates or effusions. Multiple stable calcified granulomas. Azygos lobe normal variant. The pulmonary vascularity is normal. Bones are generally moderately osteopenic. EKG leads overlie the chest.     Stable radiographic appearance of the chest. No evidence of an acute process. **This report has been created using voice recognition software. It may contain minor errors which are inherent in voice recognition technology. ** Final report electronically signed by Dr. Narayan Campos on 2/20/2021 5:38 PM    Xr Wrist Left (min 3 Views)    Result Date: 2/20/2021  PROCEDURE: XR WRIST LEFT (MIN 3 VIEWS) CLINICAL INFORMATION: wrist injury, bruising . COMPARISON: 2/9/2018 TECHNIQUE: 4 projections FINDINGS: IV tubing overlying the dorsal distal radius and wrist. This does obscure some detail. There is no acute fracture or dislocation identified. Joint spaces preserved. Bone density is slightly diminished.  There is soft tissue swelling over the dorsum of the wrist.     Soft tissue swelling no fracture **This report has been created using voice recognition software. It may contain minor errors which are inherent in voice recognition technology. ** Final report electronically signed by Dr. Almaguer Credit on 2/20/2021 5:37 PM    Xr Hand Left (min 3 Views)    Result Date: 2/20/2021  PROCEDURE: XR HAND LEFT (MIN 3 VIEWS) CLINICAL INFORMATION: hand injury, bruising . COMPARISON: No prior study. TECHNIQUE: 3 projections FINDINGS: No acute fracture or dislocation. Joint spaces are preserved. Bone density is diminished. No soft tissue abnormality is seen other than presence of an IV at the distal dorsal wrist     No acute osseous abnormality **This report has been created using voice recognition software. It may contain minor errors which are inherent in voice recognition technology. ** Final report electronically signed by Dr. Almaguer Credit on 2/20/2021 5:36 PM      Electronically signed by Lee Davila MD on 2/21/2021 at 7:22 AM

## 2021-02-21 NOTE — ED NOTES
ED to inpatient nurses report    Chief Complaint   Patient presents with    Cough    Hematemesis    Nausea    Abdominal Pain     epigastric      Present to ED from home  LOC: alert and orientated to name, place, date  Vital signs   Vitals:    02/20/21 1835 02/20/21 1935 02/20/21 2043 02/20/21 2152   BP: 114/61 (!) 102/54 122/65 (!) 96/55   Pulse: 66 68 70 72   Resp: 17 15 18 17   Temp:       TempSrc:       SpO2: 99% 98% 96% 96%   Weight:       Height:          Oxygen Baseline room air    Current needs required none Bipap/Cpap No  LDAs:   Peripheral IV 02/20/21 Left Wrist (Active)   Site Assessment Clean;Dry; Intact 02/20/21 2153   Line Status Normal saline locked 02/20/21 2153   Dressing Status Clean;Dry; Intact 02/20/21 2153     Mobility: Requires assistance * 1  Pending ED orders: none  Present condition: stable    Electronically signed by Nolan Adrian RN on 2/20/2021 at 10:37 PM     Nolan Adrian [facility]  02/20/21 6472

## 2021-02-21 NOTE — CONSULTS
Consult History & Physical      Patient:  Safia Zhou  YOB: 1955  MRN: [id number]     Acct: [de-identified]    Chief Complaint:    Chief Complaint   Patient presents with    Cough    Hematemesis    Nausea    Abdominal Pain     epigastric       Date of Service: Pt seen/examined in consultation on 2/21/2021    History Of Present Illness:      63 y.o. female who we are asked to see/evaluate by Analilia Carr MD for medical management of hematemesis, epigastric pain. Presented to ED 2/20/21 for N/V of food x2 weeks with progression to vomiting \"dark red\" contents sometimes. Reported one dark solid stool PTA to ED provider. C/o burning moderate epigastric pain for a few days. Admits cocaine use 2/19/21, states this was her first and only use in a long time. Prior rehabilitation stay. PMHx includes cocaine abuse, bipolar disorder, GERD, COPD, CVA (2019), PE 2/2 shoulder sx, HTN, DM, Hypothyroidism. She has had reported hematemesis in the past, as recent as June/July 2020. Was scheduled for SBCE November 2020 after fairly unremarkable EGD and colonoscopy however she did not show to that appointment. EGD/Dil 6/17/20 with Dr. Christiano Leavitt revealed 3 cm hiatal hernia, Schatzki's ring, tortuous distal esophagus, increased sputum production, dilated to #51 & #54 Fr dilators. Biopsies were negative for EoE, celiac sprue, H. Pylori. Colonoscopy 3/5/2019 with Dr. Christiano Leavitt with findings of grade 1 internal hemorrhoids, hyperplastic polyp, random colon biopsies negative for microscopic colitis. Hgb this a.m. 11.0. BUN 9. Negative Troponin x 4. HFP WNL. UDS + cocaine. She also reports chest pain which is reproducible. Endorses productive cough and \"chest cold\" at the moment.          Past Medical History:    Past Medical History:   Diagnosis Date    Acute renal failure with acute cortical necrosis (HCC) 12/21/2019    Allergic rhinitis     Arthritis     spine    Bipolar 1 disorder (HCC)     [facility] disorder ([facility]     Blood circulation, collateral     Cancer ([facility] 2011    cancerous polyps removed - Dr. Jonathan Vera Colon polyps     COPD (chronic obstructive pulmonary disease) ([facility] 7/24/2014    Coronary vasospasm ([facility] 8/17/2019    Depression     Diverticulitis of colon     GERD (gastroesophageal reflux disease)     Hiatal hernia     History of arterial ischemic stroke 7/20/2019    History of colonoscopy 2002    History of kidney stones     Hx of blood clots 6/17/2014    PE and collar bone area after shoulder surgery    Hyperlipidemia     Hypertension     Liver disease     enlarged liver - damaged with alcohol in past but no cirrhosis per patient    Pneumonia 7/24/2014    Suicidal thoughts     2015 admitted to  from [facility]    Thyroid disease     Type 2 diabetes mellitus without complication, without long-term current use of insulin ([facility] 7/20/2019    Vomiting     Wears dentures     Wears glasses        Home Medications:  Prior to Admission medications    Medication Sig Start Date End Date Taking?  Authorizing Provider   QUEtiapine (SEROQUEL) 200 MG tablet Take 200 mg by mouth daily PM   Yes Historical Provider, MD   famotidine (PEPCID) 20 MG tablet Take 20 mg by mouth 2 times daily   Yes Historical Provider, MD   aspirin 81 MG EC tablet Take 81 mg by mouth daily   Yes Historical Provider, MD   clopidogrel (PLAVIX) 75 MG tablet Take 75 mg by mouth daily   Yes Historical Provider, MD   albuterol (PROVENTIL) (2.5 MG/3ML) 0.083% nebulizer solution Take 3 mLs by nebulization every 6 hours as needed for Wheezing 8/26/20  Yes BA Worley - CNP   Roflumilast (DALIRESP) 500 MCG tablet Take 500 mcg by mouth daily   Yes Historical Provider, MD   Tiotropium Bromide Monohydrate (SPIRIVA HANDIHALER IN) Inhale 2 puffs into the lungs daily   Yes Historical Provider, MD   acetaminophen (MAPAP) 325 MG tablet Take 650 mg by mouth every 6 hours as needed for Pain   Yes Historical Provider, MD   potassium chloride (KLOR-CON M) 20 MEQ extended release tablet Take 40 mEq by mouth daily  5/20/20  Yes Historical Provider, MD   levothyroxine (SYNTHROID) 75 MCG tablet Take 1 tablet by mouth daily everyday except Sunday take 150 mcg. 12/26/19  Yes BA Camarillo CNP   fluticasone (FLONASE) 50 MCG/ACT nasal spray 1 spray by Each Nostril route 2 times daily 12/26/19  Yes BA Diaz CNP   ferrous sulfate 325 (65 Fe) MG tablet Take 1 tablet by mouth 2 times daily 12/26/19  Yes BA Diaz CNP   folic acid (FOLVITE) 1 MG tablet Take 1 mg by mouth daily   Yes Historical Provider, MD   ARIPiprazole (ABILIFY) 2 MG tablet Take 2 mg by mouth daily   Yes Historical Provider, MD   atorvastatin (LIPITOR) 40 MG tablet Take 40 mg by mouth nightly    Yes Historical Provider, MD   escitalopram (LEXAPRO) 20 MG tablet Take 20 mg by mouth daily   Yes Historical Provider, MD   escitalopram (LEXAPRO) 10 MG tablet Take 10 mg by mouth daily   Yes Historical Provider, MD   fenofibrate 160 MG tablet Take 145 mg by mouth daily    Yes Historical Provider, MD   traZODone (DESYREL) 100 MG tablet Take 2 tablets by mouth nightly  Patient taking differently: Take 150 mg by mouth nightly 2 tabs prn 10/31/18  Yes Polina Sutton MD   QUEtiapine (SEROQUEL XR) 300 MG extended release tablet Take 2 tablets by mouth nightly  Patient taking differently: Take 400 mg by mouth daily At 4 pm 10/31/18  Yes Polina Sutton MD   carvedilol (COREG) 3.125 MG tablet TAKE 1 TABLET BY MOUTH 2 TIMES DAILY (WITH MEALS) 9/21/18  Yes Gulshan Mckinney PA-C   ibuprofen (IBU) 600 MG tablet Take 1 tablet by mouth every 6 hours as needed for Pain 11/10/20 11/20/20  Keith Angeles,    Respiratory Therapy Supplies (NEBULIZER/TUBING/MOUTHPIECE) KIT 1 kit by Does not apply route every 6 hours as needed (shortness of breath) 8/26/20 8/26/21  BA Dailey CNP       Surgical History:  Past Surgical History:   Procedure Laterality Date    ABDOMEN SURGERY      x4 removed cysts and ovary removed    CARDIAC SURGERY      heart caths, no stents    CARPAL TUNNEL RELEASE Bilateral 2016    CHOLECYSTECTOMY, LAPAROSCOPIC  6/12/15    Dr. Fidel Malloy N/A 7/13/2020    CYSTOSCOPY WITH BLADDER BIOPSIES performed by Dani Altamirano MD at [address], [address] Right 10/7/2004    Dr. Rom He Bilateral 2016    decompression   Elmarie Sloop    Dr. Veta Mcardle with [address] [facility]    ROTATOR CUFF REPAIR  2004, 2014    right shoulder    SHOULDER SURGERY  2014    right    SKIN BIOPSY  2006    under left eye    STIMULATOR SURGERY N/A 11/4/2020    STAGE 1 INTERSTIM PLACEMENT performed by Dani Altamirano MD at [place] [address] 11/23/2020    PLACEMENT OF STAGE II INTERSTIM performed by Dani Altamirano MD at [place] [id number] History:  Family History   Problem Relation Age of Onset    Cancer Mother     Heart Disease Mother     High Blood Pressure Mother     High Cholesterol Mother     Cancer Father         brain    Depression Father     Heart Disease Father     High Cholesterol Father     Mental Illness Father     Cancer Sister     Heart Attack Sister     Heart Disease Maternal Uncle     High Cholesterol Maternal Uncle     Diabetes Maternal Grandmother     Heart Disease Maternal Grandmother     High Cholesterol Maternal Grandmother     Early Death Maternal Grandfather     Heart Disease Maternal Grandfather     High Cholesterol Maternal Grandfather     Stroke Maternal Grandfather     Heart Disease Paternal Grandmother     High Cholesterol Paternal Grandmother     Heart Disease Paternal Grandfather     High Cholesterol Paternal Grandfather      Family hx of GI cancer None    Past GI History:  Hiatal Hernia, Schatzki's ring, GERD, Colon polyp  EGD x 2020  Colonoscopy 2019  SBCE scheduled, patient No-show    Allergies:  Seasonal    Social History:   TOBACCO:   reports that she has been smoking cigarettes. She started smoking about 43 years ago. She has a 15.00 pack-year smoking history. She has never used smokeless tobacco.  ETOH:   reports no history of alcohol use. Review Of Systems  GENERAL: No fever, chills or weight loss. EYES:  No  blurred vision, double vision   CARDIOVASCULAR: +chest pain. No palpitations. RESPIRATORY:  No dyspnea. + chronic cough. GI:  See HPI  MUSCULOSKELETAL: No new painful or swollen joints or myalgias. :   No dysuria or hematuria. SKIN:  No rashes or jaundice. NEUROLOGIC:  No headaches or seizures, numbness or tingling of arms, or legs. PSYCH:  No anxiety or depression. +substance abuse    ENDOCRINE:  No polyuria or polydipsia. BLOOD:  No anemia, bleeding disorder, blood or blood product transfusion. PHYSICAL EXAM:  /72   Pulse 61   Temp 98.2 °F (36.8 °C) (Oral)   Resp 19   Ht 5' 4\" (1.626 m)   Wt 151 lb 3.2 oz (68.6 kg)   SpO2 94%   BMI 25.95 kg/m²     General appearance: No apparent distress, appears stated age and cooperative. HEENT: Normal cephalic, atraumatic without obvious deformity. Pupils equal, round, and reactive to light. Neck: Supple, with full range of motion. No jugular venous distention. Trachea midline. Respiratory:  Normal respiratory effort. Clear to auscultation, bilaterally without Rales/Wheezes/Rhonchi. Cardiovascular: Regular rate and rhythm without murmurs, rubs or gallops. Abdomen: Soft, mild epigastric pain with palpation, non-distended with active bowel sounds. Musculoskeletal: No clubbing, cyanosis or edema bilaterally. Skin: Pink, warm, dry. No rashes or lesions.   Psychiatric: Alert and oriented, thought content appropriate, normal insight    Labs:   Recent Labs     02/21/21  0723   WBC 5.4   HGB 11.0*   HCT 35.1*        Recent Labs     02/20/21  1725    K 4.1      CO2 25   BUN 9   CREATININE 0.9   CALCIUM 9.5     Recent Labs     02/20/21  1725   AST 13   ALT 12   BILITOT 0.4   ALKPHOS 54     Recent Labs     02/20/21  1725   INR 1.12       Radiology:     Chest XR 2/20/21     Impression   Stable radiographic appearance of the chest. No evidence of an acute process. Code Status: Full Code    ASSESSMENT:    1. Anemia- normocytic  2. Epigastric pain  3. Chest pain  4. Cocaine abuse  5. GERD, hx of   6. HTN  7. Hypothyroidism  8. Bipolar disorder  9. COPD  10. Hx of CVA  11. Hx of PE 2/2 surgery    PLAN:     Monitor H&H, transfuse PRN   Nursing to monitor stool and emesis, please document   IVP PPI BID   NPO   Anemia labs   Discussed need for illicit substance avoidance   EGD June 2020 for similar complaint with no acute findings, Hgb stable, no signs of active bleeding upon admission, I.e. hematemeses, melena, hematochezia. VSS. No plan for emergent endoscopy at this time.  Could be post nasal from cocaine use and evidence of large amounts of nasal drainage on last EGD with report of chronic cough that precipitates the emesis. Will follow    Case reviewed and impression/plan reviewed in collaboration with Dr. Aguilar Headings  Electronically signed by BA Boyd CNP on 2/21/2021 at 11:28 AM    [facility]  Thank you for the consultation.

## 2021-02-21 NOTE — PROGRESS NOTES
Pt arrived on unit via wheelchair accompanied by LPN. Pt A/O x 4, VS remained stable. Skin assessment completed by Primary RN and Charge nurse Silvia Razo at the pts bedside. Provider notified of patient complaining of intermit. Left anterior chest pain and Epigastric pain. Pt reports this is not new for her and remains intermit. Provider evaluated pt a the bedside please see orders.

## 2021-02-21 NOTE — ED NOTES
Pt medicated per MAR. EKG performed. Pt denies need at this time.       Ashlee Rodriguez  02/20/21 2045

## 2021-02-21 NOTE — H&P
[facility] Hospitalist History & Physical   2/20/2021  4:07 PM   Assessment and Plan:        1. Hematemesis / Melena / Hx of UGIB: differential: PUD/GERD, fahad-kay tear  a. EGD 2019 with esophageal dilation for stricture. b. PTT/INR wnl. hgb 11.8 (baseline 14), MCV 90, plt 227.   c. Received 325mg ASA by ED provider. d. PPI bolus and drip. IVF. hgb q6 prn, transfuse for hgb < 7. Hold DAPT. IVF. Telemetry. Consult GI.  NPO. Document all vomit/BM for blood. 2. Acute Chest Pain / Hx of MI (cocaine-induced) / Nonobstructive CAD (per [facility] 2017): likely 2/2 GERD/hematemesis/ ? PUD   a. Chest pain burning in nature. Not improved with nitro in the ED, not worsened by exertion. b. Troponin neg x2. EKG shows NSR with (old) q-waves in II/III/aVF, V5-V6    c. Received 324 mg ASA in the ED. AVOID BB with chronic cocaine use  d. Trend x1 to r/o ACS with recent cocaine use yesterday. 3. Hx GERD: noted. PPI as above. 4. Essential Hypertension, controlled: Hold/discontinue BB due to chronic (since 1980) cocaine use. Will start alternative anti-hypertensive if needed. 5. Hypothyroidism: resume synthroid. 6. Bipolar I / Insomnia: resume lexapro, abilify, seroquel (unclear why on 2 antipsychotics), trazodone   7. COPD (without baseline supplemental O2 use), without exacerbation: resume daliresp, tiotropium, PRN albuterol. 8. Hx CVA (2014): hold DAPT as above. 9. Hx provoked PE (2014): noted. Hold DAPT as above. 10. NIDDM2, controlled: hold metformin. Monitor daily. Goal -180 while hospitalized. Consider SSI if hyperglycemic  11. HLD: resume statin / Fenofibrate. 12. Chronic Cocaine Use: consult addiction services as patient has an interest in quitting.  Avoid BB        CC: Hematemesis  HPI: Hannah Maki is a 63year old white female smoker with PMH of cocaine use, bipolar 1, COPD, GERD, CVA (2019), provoked PE (2014) after shoulder surgery, HLD, HTN, NIDDM2, hypothyroidism who presented to [facility] ED on 2/20/21 c/o nonbilious vomit containing food for 2 weeks. She notes that at the end of the day she has been having significant hematemesis of dark red blood when there is nothing left in her stomach left to vomit. She notes one day of melena with formed stool. She notes 6/10 burning epigastric and midline chest pain that worsens with vomiting, nonexertional, did not improve with nitro, alleviated with morphine. She states that coughing fits have been what has been precipitating her vomiting. She has COPD and has been coughing until she gags. She denies increased sputum production or inc SOB, fever, chills. In the ED, vitals showed: HR 68, /90, RR 18, 94% on RA and afebrile with Tmax 99. Labs showed: GFR 63, troponin neg x2, BNP wnl, LFTs wnl, Urine tox positive for cocaine, hgb 11.8, MCV 90.3, plt 227, wbc 5.8, INR 1.12, PTT 32.9. UA clean. CXR shows no acute process. ROS: A 14 point ROS was performed and was negative other than the pertinent positives noted in the HPI  PMH:  Per HPI  SHX:  Current 0.5 PPD smoker with 15 pack year history. Denies EtOH use. Current crack cocaine use ~1 time per week   FHX: Mother: CA, Heart disease, HLD. Father: Depression, heart disease, HLD, mental illness. Sister: CA, MI. Other: CVA, DM. Allergies: NKDA.  Seasonal.   Medications:     sodium chloride 75 mL/hr at 02/20/21 2343    pantoprozole (PROTONIX) infusion 8 mg/hr (02/21/21 0020)      sodium chloride flush  10 mL Intravenous 2 times per day    ARIPiprazole  2 mg Oral Daily    [Held by provider] aspirin  81 mg Oral Daily    atorvastatin  40 mg Oral Nightly    [Held by provider] clopidogrel  75 mg Oral Daily    escitalopram  20 mg Oral Daily    fenofibrate  160 mg Oral Daily    [Held by provider] ferrous sulfate  325 mg Oral BID    fluticasone  1 spray Each Nostril BID    folic acid  1 mg Oral Daily    levothyroxine  75 mcg Oral Daily    QUEtiapine  600 mg Oral Nightly    Roflumilast  500 mcg Oral Daily    tiotropium  1 puff Inhalation Daily    traZODone  150 mg Oral Nightly       Vital Signs:   BP 95/60   Pulse 62   Temp 98.7 °F (37.1 °C) (Oral)   Resp 19   Ht 5' 4\" (1.626 m)   Wt 148 lb (67.1 kg)   SpO2 95%   BMI 25.40 kg/m²      Intake/Output Summary (Last 24 hours) at 2/21/2021 0128  Last data filed at 2/20/2021 2330  Gross per 24 hour   Intake 0 ml   Output --   Net 0 ml        General:  White female well groomed, well-nourished, well-developed who appears stated age, in no acute distress lying in bed. Head: Normocephalic and atraumatic. EENT: No exophthalmos noted. No scleral or conjunctiva icterus, injection or pallor noted. Neck: Supple. Trachea midline. No thyromegaly. Thorax/Lungs: Thorax is symmetrical with good expansion. Breath sounds CTA and equal b/l without rales, wheezing, or rhonchi. No retractions or use of abdominal muscles. Cardiac: S1, S2, RRR with 3/6 murmur best heard at 4th intercostal midsternal line, rub, or gallop. No JVD  Abdomen: Abdomen soft, tender to palpation of epigastric region, without guarding or rigidity. Normoactive BS. Peripheral Vasculature: Extremities warm, dry without edema, no varicosities or stasis changes, DP  pulses 2+ b/l. Brisk capillary refill. Skin:  Skin warm and dry. No lesions, rash. Psych:  Alert and oriented x3. Affect appropriate  Neurologic: No focal deficits. No Seizures.      Data:   Labs:   Results for orders placed or performed during the hospital encounter of 02/20/21   COVID-19, Rapid   Result Value Ref Range    SARS-CoV-2, NAAT NOT DETECTED NOT DETECTED   CBC Auto Differential   Result Value Ref Range    WBC 5.8 4.8 - 10.8 thou/mm3    RBC 4.11 (L) 4.20 - 5.40 mill/mm3    Hemoglobin 11.8 (L) 12.0 - 16.0 gm/dl    Hematocrit 37.1 37.0 - 47.0 %    MCV 90.3 81.0 - 99.0 fL    MCH 28.7 26.0 - 33.0 pg    MCHC 31.8 (L) 32.2 - 35.5 gm/dl    RDW-CV 13.2 11.5 - 14.5 %    RDW-SD 43.1 35.0 - 45.0 fL    Platelets 625 466 - 534 thou/mm3 MPV 10.0 9.4 - 12.4 fL    Seg Neutrophils 57.1 %    Lymphocytes 32.2 %    Monocytes 9.0 %    Eosinophils 0.5 %    Basophils 0.7 %    Immature Granulocytes 0.5 %    Segs Absolute 3.3 1.8 - 7.7 thou/mm3    Lymphocytes Absolute 1.9 1.0 - 4.8 thou/mm3    Monocytes Absolute 0.5 0.4 - 1.3 thou/mm3    Eosinophils Absolute 0.0 0.0 - 0.4 thou/mm3    Basophils Absolute 0.0 0.0 - 0.1 thou/mm3    Immature Grans (Abs) 0.03 0.00 - 0.07 thou/mm3    nRBC 0 /100 wbc   Comprehensive Metabolic Panel w/ Reflex to MG   Result Value Ref Range    Glucose 91 70 - 108 mg/dL    CREATININE 0.9 0.4 - 1.2 mg/dL    BUN 9 7 - 22 mg/dL    Sodium 135 135 - 145 meq/L    Potassium reflex Magnesium 4.1 3.5 - 5.2 meq/L    Chloride 104 98 - 111 meq/L    CO2 25 23 - 33 meq/L    Calcium 9.5 8.5 - 10.5 mg/dL    AST 13 5 - 40 U/L    Alkaline Phosphatase 54 38 - 126 U/L    Total Protein 6.4 6.1 - 8.0 g/dL    Albumin 3.7 3.5 - 5.1 g/dL    Total Bilirubin 0.4 0.3 - 1.2 mg/dL    ALT 12 11 - 66 U/L   Troponin   Result Value Ref Range    Troponin T < 0.010 ng/ml   Brain Natriuretic Peptide   Result Value Ref Range    Pro-.1 0.0 - 900.0 pg/mL   Blood occult stool screen #1   Result Value Ref Range    OCCULT BLOOD FECAL Negative    Protime-INR   Result Value Ref Range    INR 1.12 0.85 - 1.13   Urine Drug Screen   Result Value Ref Range    AMPHETAMINE+METHAMPHETAMINE URINE SCREEN Negative NEGATIVE    Barbiturate Quant, Ur Negative NEGATIVE    Benzodiazepine Quant, Ur Negative NEGATIVE    Cannabinoid Quant, Ur Negative NEGATIVE    Cocaine Metab Quant, Ur POSITIVE NEGATIVE    Opiates, Urine Negative NEGATIVE    Oxycodone Negative NEGATIVE    PCP Quant, Ur Negative NEGATIVE   APTT   Result Value Ref Range    aPTT 32.9 22.0 - 38.0 seconds   Anion Gap   Result Value Ref Range    Anion Gap 6.0 (L) 8.0 - 16.0 meq/L   Osmolality   Result Value Ref Range    Osmolality Calc 268.4 (L) 275.0 - 300.0 mOsmol/kg   Glomerular Filtration Rate, Estimated   Result Value Ref Range    Est, Glom Filt Rate 63 (A) ml/min/1.73m2   Microscopic Urinalysis   Result Value Ref Range    Glucose, Urine NEGATIVE NEGATIVE mg/dl    Bilirubin Urine NEGATIVE NEGATIVE    Ketones, Urine NEGATIVE NEGATIVE    Specific Gravity, UA 1.012 1.002 - 1.030    Blood, Urine NEGATIVE NEGATIVE    pH, UA 6.5 5.0 - 9.0    Protein, UA NEGATIVE NEGATIVE mg/dl    Urobilinogen, Urine 1.0 0.0 - 1.0 eu/dl    Nitrite, Urine NEGATIVE NEGATIVE    Leukocytes, UA SMALL (A) NEGATIVE    Color, UA YELLOW YELLOW-STRAW    Character, Urine CLEAR [id number].[id number]    RBC, UA 3-5 0-2/hpf /hpf    WBC, UA 15-25 0-4/hpf /hpf    Epithelial Cells, UA 3-5 3-5/hpf /hpf    Bacteria, UA NONE SEEN FEW/NONE SEEN    Casts NONE SEEN NONE SEEN /lpf    Crystals NONE SEEN NONE SEEN    Renal Epithelial, UA NONE SEEN NONE SEEN    Yeast, UA NONE SEEN NONE SEEN    Casts NONE SEEN /lpf    Miscellaneous Lab Test Result NONE SEEN    Troponin   Result Value Ref Range    Troponin T < 0.010 ng/ml   APTT   Result Value Ref Range    aPTT 33.0 22.0 - 38.0 seconds   Troponin   Result Value Ref Range    Troponin T < 0.010 ng/ml   EKG 12 Lead   Result Value Ref Range    Ventricular Rate 69 BPM    Atrial Rate 69 BPM    P-R Interval 136 ms    QRS Duration 80 ms    Q-T Interval 414 ms    QTc Calculation (Bazett) 443 ms    P Axis 59 degrees    R Axis 16 degrees    T Axis 61 degrees   EKG Emergency   Result Value Ref Range    Ventricular Rate 66 BPM    Atrial Rate 66 BPM    P-R Interval 144 ms    QRS Duration 86 ms    Q-T Interval 424 ms    QTc Calculation (Bazett) 444 ms    P Axis 62 degrees    R Axis 6 degrees    T Axis 32 degrees       EKG / Radiology:     EKG:  Reviewed by me: NSR with (old) q-waves in II/III/aVF, V5-V6      CXR:   Reviewed by me: no acute findings, hyperinflated lungs. Xr Chest (2 Vw)    Result Date: 2/20/2021  PROCEDURE: XR CHEST (2 VW) CLINICAL INFORMATION: Chest pain.  COMPARISON: 5/30/2020 TECHNIQUE: PA and lateral views the chest. FINDINGS: The heart discussed with Wilmer Cooley MD  Electronically signed by Guilherme Blair on 2/21/2021 at 1:28 AM

## 2021-02-21 NOTE — CONSULTS
Patient transferred GI care to [facility] , followed by Dr. Chuck Allison , please consult Dr. Chuck Allison

## 2021-02-21 NOTE — ED NOTES
Patient transferred to [facility] room 13. Nurse notified.       Alia Willson  02/20/21 8933

## 2021-02-21 NOTE — ED NOTES
Pt resting in bed with eyes closed, respirations easy and unlabored. Call light remains within reach.      Sherrie Knight RN  02/20/21 1625

## 2021-02-22 VITALS
HEART RATE: 74 BPM | RESPIRATION RATE: 16 BRPM | BODY MASS INDEX: 25.54 KG/M2 | TEMPERATURE: 97.6 F | DIASTOLIC BLOOD PRESSURE: 67 MMHG | WEIGHT: 149.6 LBS | OXYGEN SATURATION: 94 % | SYSTOLIC BLOOD PRESSURE: 134 MMHG | HEIGHT: 64 IN

## 2021-02-22 LAB
ANION GAP SERPL CALCULATED.3IONS-SCNC: 8 MEQ/L (ref 8–16)
BASOPHILS # BLD: 0.8 %
BASOPHILS ABSOLUTE: 0 THOU/MM3 (ref 0–0.1)
BUN BLDV-MCNC: 8 MG/DL (ref 7–22)
CALCIUM SERPL-MCNC: 9.7 MG/DL (ref 8.5–10.5)
CHLORIDE BLD-SCNC: 107 MEQ/L (ref 98–111)
CO2: 21 MEQ/L (ref 23–33)
CREAT SERPL-MCNC: 0.7 MG/DL (ref 0.4–1.2)
EOSINOPHIL # BLD: 1 %
EOSINOPHILS ABSOLUTE: 0.1 THOU/MM3 (ref 0–0.4)
ERYTHROCYTE [DISTWIDTH] IN BLOOD BY AUTOMATED COUNT: 12.9 % (ref 11.5–14.5)
ERYTHROCYTE [DISTWIDTH] IN BLOOD BY AUTOMATED COUNT: 43 FL (ref 35–45)
GFR SERPL CREATININE-BSD FRML MDRD: 84 ML/MIN/1.73M2
GLUCOSE BLD-MCNC: 79 MG/DL (ref 70–108)
HCT VFR BLD CALC: 35.7 % (ref 37–47)
HEMOGLOBIN: 10.9 GM/DL (ref 12–16)
IMMATURE GRANS (ABS): 0.01 THOU/MM3 (ref 0–0.07)
IMMATURE GRANULOCYTES: 0.2 %
LYMPHOCYTES # BLD: 46.2 %
LYMPHOCYTES ABSOLUTE: 2.4 THOU/MM3 (ref 1–4.8)
MAGNESIUM: 1.8 MG/DL (ref 1.6–2.4)
MCH RBC QN AUTO: 28.2 PG (ref 26–33)
MCHC RBC AUTO-ENTMCNC: 30.5 GM/DL (ref 32.2–35.5)
MCV RBC AUTO: 92.2 FL (ref 81–99)
MONOCYTES # BLD: 7.8 %
MONOCYTES ABSOLUTE: 0.4 THOU/MM3 (ref 0.4–1.3)
NUCLEATED RED BLOOD CELLS: 0 /100 WBC
PLATELET # BLD: 224 THOU/MM3 (ref 130–400)
PMV BLD AUTO: 9.6 FL (ref 9.4–12.4)
POTASSIUM REFLEX MAGNESIUM: 3.6 MEQ/L (ref 3.5–5.2)
RBC # BLD: 3.87 MILL/MM3 (ref 4.2–5.4)
SEG NEUTROPHILS: 44 %
SEGMENTED NEUTROPHILS ABSOLUTE COUNT: 2.3 THOU/MM3 (ref 1.8–7.7)
SODIUM BLD-SCNC: 136 MEQ/L (ref 135–145)
TROPONIN T: < 0.01 NG/ML
TROPONIN T: < 0.01 NG/ML
WBC # BLD: 5.3 THOU/MM3 (ref 4.8–10.8)

## 2021-02-22 PROCEDURE — 2580000003 HC RX 258: Performed by: NURSE PRACTITIONER

## 2021-02-22 PROCEDURE — 99239 HOSP IP/OBS DSCHRG MGMT >30: CPT | Performed by: INTERNAL MEDICINE

## 2021-02-22 PROCEDURE — 36415 COLL VENOUS BLD VENIPUNCTURE: CPT

## 2021-02-22 PROCEDURE — 6360000002 HC RX W HCPCS: Performed by: NURSE PRACTITIONER

## 2021-02-22 PROCEDURE — 80048 BASIC METABOLIC PNL TOTAL CA: CPT

## 2021-02-22 PROCEDURE — 84484 ASSAY OF TROPONIN QUANT: CPT

## 2021-02-22 PROCEDURE — C9113 INJ PANTOPRAZOLE SODIUM, VIA: HCPCS | Performed by: NURSE PRACTITIONER

## 2021-02-22 PROCEDURE — 83735 ASSAY OF MAGNESIUM: CPT

## 2021-02-22 PROCEDURE — 6370000000 HC RX 637 (ALT 250 FOR IP): Performed by: PHYSICIAN ASSISTANT

## 2021-02-22 PROCEDURE — 2580000003 HC RX 258: Performed by: PHYSICIAN ASSISTANT

## 2021-02-22 PROCEDURE — 85025 COMPLETE CBC W/AUTO DIFF WBC: CPT

## 2021-02-22 PROCEDURE — 94640 AIRWAY INHALATION TREATMENT: CPT

## 2021-02-22 PROCEDURE — 6370000000 HC RX 637 (ALT 250 FOR IP): Performed by: NURSE PRACTITIONER

## 2021-02-22 PROCEDURE — 92610 EVALUATE SWALLOWING FUNCTION: CPT

## 2021-02-22 RX ORDER — PANTOPRAZOLE SODIUM 40 MG/1
40 TABLET, DELAYED RELEASE ORAL
Qty: 60 TABLET | Refills: 0 | Status: CANCELLED | OUTPATIENT
Start: 2021-02-22

## 2021-02-22 RX ORDER — PANTOPRAZOLE SODIUM 40 MG/1
40 TABLET, DELAYED RELEASE ORAL 2 TIMES DAILY
Qty: 60 TABLET | Refills: 2 | Status: SHIPPED | OUTPATIENT
Start: 2021-02-22 | End: 2021-06-16

## 2021-02-22 RX ORDER — SUCRALFATE 1 G/1
1 TABLET ORAL 4 TIMES DAILY
Qty: 120 TABLET | Refills: 3 | Status: ON HOLD | OUTPATIENT
Start: 2021-02-22

## 2021-02-22 RX ORDER — ASPIRIN 81 MG/1
81 TABLET ORAL DAILY
Qty: 30 TABLET | Refills: 3 | Status: ON HOLD | OUTPATIENT
Start: 2021-02-22

## 2021-02-22 RX ORDER — HYDROXYZINE PAMOATE 50 MG/1
50 CAPSULE ORAL 3 TIMES DAILY PRN
Qty: 90 CAPSULE | Refills: 0 | Status: ON HOLD | OUTPATIENT
Start: 2021-02-22

## 2021-02-22 RX ORDER — NITROGLYCERIN 0.4 MG/1
TABLET SUBLINGUAL
Qty: 25 TABLET | Refills: 3 | Status: ON HOLD | OUTPATIENT
Start: 2021-02-22

## 2021-02-22 RX ORDER — TOPIRAMATE 50 MG/1
50 TABLET, FILM COATED ORAL 2 TIMES DAILY
Qty: 60 TABLET | Refills: 3 | Status: ON HOLD | OUTPATIENT
Start: 2021-02-22

## 2021-02-22 RX ADMIN — FENOFIBRATE 160 MG: 160 TABLET ORAL at 09:43

## 2021-02-22 RX ADMIN — LEVOTHYROXINE SODIUM 75 MCG: 0.07 TABLET ORAL at 09:43

## 2021-02-22 RX ADMIN — IRON SUCROSE 300 MG: 20 INJECTION, SOLUTION INTRAVENOUS at 11:08

## 2021-02-22 RX ADMIN — ROFLUMILAST 500 MCG: 500 TABLET ORAL at 09:43

## 2021-02-22 RX ADMIN — SODIUM CHLORIDE: 9 INJECTION, SOLUTION INTRAVENOUS at 01:13

## 2021-02-22 RX ADMIN — PANTOPRAZOLE SODIUM 40 MG: 40 INJECTION, POWDER, FOR SOLUTION INTRAVENOUS at 09:43

## 2021-02-22 RX ADMIN — ARIPIPRAZOLE 2 MG: 2 TABLET ORAL at 09:43

## 2021-02-22 RX ADMIN — MAGNESIUM HYDROXIDE 30 ML: 2400 SUSPENSION ORAL at 11:08

## 2021-02-22 RX ADMIN — FLUTICASONE PROPIONATE 1 SPRAY: 50 SPRAY, METERED NASAL at 09:44

## 2021-02-22 RX ADMIN — SODIUM CHLORIDE, PRESERVATIVE FREE 10 ML: 5 INJECTION INTRAVENOUS at 09:44

## 2021-02-22 RX ADMIN — ESCITALOPRAM 20 MG: 20 TABLET, FILM COATED ORAL at 09:43

## 2021-02-22 RX ADMIN — TIOTROPIUM BROMIDE INHALATION SPRAY 1 PUFF: 3.12 SPRAY, METERED RESPIRATORY (INHALATION) at 09:58

## 2021-02-22 RX ADMIN — FOLIC ACID 1 MG: 1 TABLET ORAL at 09:43

## 2021-02-22 NOTE — PROGRESS NOTES
[address] [address]  SPEECH THERAPY  [facility] 6K  Bedside Swallowing Evaluation      SLP Individual Minutes  Time In: 1045  Time Out: 7889  Minutes: 8  Timed Code Treatment Minutes: 0 Minutes       Date: 2021  Patient Name: Sue Ruiz      CSN: [id number]   : 1955  (63 y.o.)  Gender: female   Referring Physician:  Dr. Vahid Vargas  Diagnosis: Hematemesis   Secondary Diagnosis: Dysphagia  History of Present Illness/Injury: Patient admitted to [facility] with above dx. See physician H&P for full report. Per chart review, 'patient with hx of CVA in .' ST consulted d/t concerns for aspiration/swallow concerns. ST to complete BSE and determine safety of PO diet and further POC. Past Medical History:   Diagnosis Date    Acute renal failure with acute cortical necrosis (HCC) 2019    Allergic rhinitis     Arthritis     spine    Bipolar 1 disorder (HCC)     Bipolar disorder ([address]     Blood circulation, collateral     Cancer ([address]     cancerous polyps removed - Dr. Che Gonzalez Colon polyps     COPD (chronic obstructive pulmonary disease) ([address] 2014    Coronary vasospasm ([address] 2019    Depression     Diverticulitis of colon     GERD (gastroesophageal reflux disease)     Hiatal hernia     History of arterial ischemic stroke 2019    History of colonoscopy 2002    History of kidney stones     Hx of blood clots 2014    PE and collar bone area after shoulder surgery    Hyperlipidemia     Hypertension     Liver disease     enlarged liver - damaged with alcohol in past but no cirrhosis per patient    Pneumonia 2014    Suicidal thoughts      ad[facility]  from [facility]    Thyroid disease     Type 2 diabetes mellitus without complication, without long-term current use of insulin ([address] 2019    Vomiting     Wears dentures     Wears glasses        SUBJECTIVE:  Patient seen at bedside; alert and pleasant.  No family present good    EDUCATION:  Learner: Patient  Education:  Reviewed results and recommendations of this evaluation, Reviewed diet and strategies, Reviewed signs, symptoms and risks of aspiration, Reviewed ST goals and Plan of Care, Reviewed recommendations for follow-up and Education Related to Potential Risks and Complications Due to Impairment/Illness/Injury  Evaluation of Education: Verbalizes understanding, Demonstrates with assistance and Family not present    PLAN:  No further speech therapy services indicated.     PATIENT GOAL:    \"go home\"        YANCI Perez 23

## 2021-02-22 NOTE — PROGRESS NOTES
Gastroenterology Progress Note:     Patient Name:  Teresa Lancaster   MRN: [id number]  [id number]  YOB: 1955  Admit Date: 2/20/2021  4:07 PM  Primary Care Physician: No primary care provider on file. [id number]     Patient seen and examined. 24 hours events and chart reviewed. Subjective: Patient resting in bed. Denies abdominal pain, nausea, and vomiting. She states she feels like she needs to [facility] a bowel movement. \" She had a bowel movement yesterday, but does not feel like she emptied all the way. Hgb 10.9 Noted to be iron deficient on labs. Objective:  /61   Pulse 64   Temp 97.7 °F (36.5 °C) (Oral)   Resp 16   Ht 5' 4\" (1.626 m)   Wt 149 lb 9.6 oz (67.9 kg)   SpO2 94%   BMI 25.68 kg/m²     Physical Exam:    General:  Nourished in no distress  HEENT: Atraumatic, normocephalic. Moist oral mucous membranes. Neck: Supple without adenopathy, JVD, thyromegaly or masses. Trachea midline. CV: Heart RRR, no murmurs, rubs, gallops. Resp: Even, easy without cough or accessory use. Lungs clear to ascultation bilaterally. Abd: Round, soft, nontender. No hepatosplenomegaly or mass present. Active bowel sounds heard. No distention noted. Ext:  Without cyanosis, clubbing, edema. Skin: Pink, warm, dry  Neuro:  Alert, oriented x 3 with no obvious deficits.        Rectal: deferred    Labs:   CBC:   Lab Results   Component Value Date    WBC 5.3 02/22/2021    HGB 10.9 02/22/2021    HCT 35.7 02/22/2021    MCV 92.2 02/22/2021     02/22/2021     BMP:   Lab Results   Component Value Date     02/22/2021    K 3.6 02/22/2021     02/22/2021    CO2 21 02/22/2021    PHOS 3.2 11/17/2017    BUN 8 02/22/2021    CREATININE 0.7 02/22/2021    CALCIUM 9.7 02/22/2021     PT/INR:   Lab Results   Component Value Date    PROTIME 14.6 12/12/2019    INR 1.12 02/20/2021     Lipids:   Lab Results   Component Value Date    ALKPHOS 54 02/20/2021    ALT 12 02/20/2021    AST 13 02/20/2021 BILITOT 0.4 02/20/2021    BILIDIR <0.2 05/17/2020    LABALBU 3.7 02/20/2021    LABALBU 4.3 04/19/2012    AMYLASE 84 05/30/2020    LIPASE 66.1 05/30/2020      Ref. Range 2/21/2021 00:18   Ferritin Latest Ref Range: 10 - 291 ng/mL 309 (H)   Iron Latest Ref Range: 50 - 170 ug/dL 31 (L)   Iron Saturation Latest Ref Range: 20 - 50 % 12 (L)   TIBC Latest Ref Range: 171 - 450 ug/dL 263      Ref. Range 2/21/2021 12:05   Folate Latest Ref Range: 4.8 - 24.2 ng/mL > 20.0   Vitamin B-12 Latest Ref Range: 211 - 911 pg/mL 225     Current Meds:  Scheduled Meds:   magnesium hydroxide  30 mL Oral Once    pantoprazole  40 mg Intravenous BID    sodium chloride flush  10 mL Intravenous 2 times per day    ARIPiprazole  2 mg Oral Daily    [Held by provider] aspirin  81 mg Oral Daily    atorvastatin  40 mg Oral Nightly    [Held by provider] clopidogrel  75 mg Oral Daily    escitalopram  20 mg Oral Daily    fenofibrate  160 mg Oral Daily    [Held by provider] ferrous sulfate  325 mg Oral BID    fluticasone  1 spray Each Nostril BID    folic acid  1 mg Oral Daily    levothyroxine  75 mcg Oral Daily    QUEtiapine  600 mg Oral Nightly    Roflumilast  500 mcg Oral Daily    tiotropium  1 puff Inhalation Daily    traZODone  150 mg Oral Nightly     Continuous Infusions:   sodium chloride 75 mL/hr at 02/22/21 0113       Assessment:  62 yo F admitted 02/20/21 hematemesis, epigastric pain. Endorsed 1 dark stool PTA, dark red in the emesis. Initial Hgb 11.0. H/O iron deficiency anemia, on PO iron. On aspirin & Plavix for history of stroke, PE, blood clots. Urine drug screen positive for cocaine. EGD 06/2020, colon 03/2019. No showed for [facility] appointment 11/2020. Endorses chest pain, cardiac evaluation negative. 1. Normocytic anemia  2. Iron deficiency anemia- on PO iron  3. Epigastric pain- resolved  4. Chest pain- work-up negative- resolved  5. Hematemesis- none since admission  6. GERD  7. H/O HTN  8.  Cocaine abuse- positive urine drug screen  9. COPD, not exacerbated  10. Hypothyroidism  11. Bipolar disorder  12. H/O CVA- on aspirin & Plavix  13. H/O PE & blood clots- on aspirin     Plan:   Monitor H & H, transfuse prn   Nursing to monitor stool output & document   IV Venofer once   Continue IVP PPI BID   Clear liquid diet, nothing red   Discussed need for illicit substance avoidance   MOM once   No plan for emergent endoscopy at this time given recent EGD 06/2020 for similar complaints which was negative. If Hgb significantly trends down and/or overt GI bleeding noted, will consider endoscopy.    Supportive care per primary team  Will follow    Case reviewed and impression/plan reviewed in collaboration with Dr. Jaylin Soto  Electronically signed by BA Aldridge CNP on 2/22/2021 at 10:09 AM    [facility]

## 2021-02-22 NOTE — FLOWSHEET NOTE
02/22/21 1418   Encounter Summary   Services provided to: Patient   Referral/Consult From: [facility] Family members   Complexity of Encounter Low   Length of Encounter 15 minutes   Routine   Type Initial   Assessment Calm; Approachable   Intervention Active listening;Nurtured hope;Prayer

## 2021-02-22 NOTE — DISCHARGE INSTR - COC
Continuity of Care Form    Patient Name: Tereas Lancaster   :  1955  MRN:  [id number]    Admit date:  2021  Discharge date:  ***    Code Status Order: Full Code   Advance Directives:   Advance Care Flowsheet Documentation       Date/Time Healthcare Directive Type of Healthcare Directive Copy in [address] Agent's Name Healthcare Agent's Phone Number    [phone]  No, patient does not have an advance directive for healthcare treatment -- -- -- -- --            Admitting Physician:  Wilmer Cooley MD  PCP: No primary care provider on file. Discharging Nurse: [facility] Unit/Room#: 6K-28/028-A  Discharging Unit Phone Number: ***    Emergency Contact:   Extended Emergency Contact Information  [facility] [address] Phone: [phone]  Mobile Phone: [phone]  Relation: Brother/Sister  Hearing or visual needs: None  Other needs: None  Preferred language: English   needed? No  Secondary Emergency Contact: Ruben Ortiz  Mobile Phone: [phone]  Relation: Brother/Sister  Preferred language: English   needed?  No    Past Surgical History:  Past Surgical History:   Procedure Laterality Date    ABDOMEN SURGERY      x4 removed cysts and ovary removed    CARDIAC SURGERY      heart caths, no stents    CARPAL TUNNEL RELEASE Bilateral 2016    CHOLECYSTECTOMY, LAPAROSCOPIC  6/12/15    Dr. Reuben Ozuna    COLONOSCOPY  2011    CYSTOSCOPY W BIOPSY OF BLADDER N/A 2020    CYSTOSCOPY WITH BLADDER BIOPSIES performed by Wilma Maxwell MD at [address], [address] Right 10/7/2004    Dr. Elfego Berrios Bilateral 2016    decompression    Cisse Ege    Dr. Hawthorne Nurse with [facility]  ,     right shoulder    SHOULDER SURGERY  2014    right    SKIN BIOPSY  2006    under left eye STIMULATOR SURGERY N/A 11/4/2020    STAGE 1 INTERSTIM PLACEMENT performed by Valentino Knock, MD at [address] [place] [address] 11/23/2020    PLACEMENT OF STAGE II INTERSTIM [facility]       Immunization History:   Immunization History   Administered Date(s) Administered    Influenza Virus Vaccine 02/16/2012, 10/10/2012, 10/14/2013, 09/12/2014, 10/11/2015, 10/27/2016, 10/08/2018    Influenza, MDCK Tosha Sabins, with preserv IM (Flucelvax 4 yrs and older) 10/01/2019    Influenza, Quadv, IM, PF (6 mo and older Fluzone, Flulaval, Fluarix, and 3 yrs and older Afluria) 10/27/2016, 10/08/2018    Pneumococcal Polysaccharide (Vemozubbg46) 02/16/2012, 10/11/2015    Tdap (Boostrix, Adacel) 06/24/2013, 04/22/2016, 06/11/2019       Active Problems:  Patient Active Problem List   Diagnosis Code    GERD (gastroesophageal reflux disease) K21.9    Colon polyps K63.5    Chest pain, atypical R07.89    Gastroenteritis K52.9    Pneumonia J18.9    Shoulder pain M25.519    History of pulmonary embolism Z86.711    COPD (chronic obstructive pulmonary disease) ([facility] J44.9    Hypoglycemia E16.2    Anticoagulated on Coumadin Z79.01    Bipolar disorder ([facility] F31.9    Cannabis abuse F12.10    Cocaine abuse ([address] F14.10    Alcohol dependence in remission ([facility] F10.21    Cholecystitis with cholelithiasis K80.10    GI bleed K92.2    Erosive esophagitis K22.10    Hypokalemia E87.6    HTN (hypertension), benign I10    Bipolar 1 disorder ([address] F31.9    Chronic pain G89.29    Hiatal hernia K44.9    Chest pain R07.9    Vomiting R11.10    Erosive gastritis K29.60    H pylori ulcer K27.9, B96.81    Hematemesis K92.0    History of Helicobacter pylori infection Z86.19    Intractable abdominal pain R10.9    Intractable vomiting with nausea R11.2    Epigastric abdominal pain R10.13    Acute blood loss anemia D62    Chronic chest pain R07.9, G89.29    Hyponatremia Q32.3    Metabolic acidosis P80.9    Essential hypertension I10 E78.2    Large liver R16.0    Lesion of ulnar nerve G56.20    Nasal congestion R09.81    Nicotine dependence F17.200    Pancreatic insufficiency K86.89    Primary osteoarthritis involving multiple joints M89.49    Sciatica M54.30    Sprain of right foot S93.601A    Preoperative state [id number]    Type 2 diabetes mellitus without complication, without long-term current use of insulin (HCC) E11.9    Urinary incontinence, overflow N39.490    Elevated lactic acid level R79.89    Acute renal failure with acute cortical necrosis (HCC) N17.1    Pyelonephritis of left kidney N12    Pyelonephritis N12    Septicemia (HCC) A41.9       Isolation/Infection:   Isolation            Contact          Patient Infection Status       Infection Onset Added Last Indicated Last Indicated By Review Planned Expiration Resolved Resolved By    MRSA 02/21/21 02/21/21 02/21/21 MRSA by PCR        Nares 2/2021    Resolved    COVID-19 Rule Out 02/20/21 02/20/21 02/20/21 COVID-19, Rapid (Ordered)   02/20/21 Rule-Out Test Resulted    COVID-19 Rule Out 11/16/20 11/16/20 11/16/20 Covid-19 Ambulatory (Ordered)   11/19/20 Rule-Out Test Resulted    COVID-19 Rule Out 10/23/20 10/23/20 10/28/20 Covid-19 Ambulatory (Ordered)   10/30/20 Rule-Out Test Resulted    COVID-19 Rule Out 10/02/20 10/02/20 10/02/20 Covid-19 Ambulatory (Ordered)   10/03/20 Rule-Out Test Resulted    COVID-19 Rule Out 09/14/20 09/14/20 09/14/20 Covid-19 Ambulatory (Ordered)   09/15/20 Rule-Out Test Resulted    COVID-19 Rule Out 07/07/20 07/07/20 07/07/20 COVID-19 (Ordered)   07/08/20 Rule-Out Test Resulted    C-diff Rule Out  12/22/19 12/22/19 Clostridium Difficile Toxin/Antigen (Ordered)   12/22/19 Rule-Out Test Resulted            Nurse Assessment:  Last Vital Signs: /67   Pulse 74   Temp 97.6 °F (36.4 °C) (Oral)   Resp 16   Ht 5' 4\" (1.626 m)   Wt 149 lb 9.6 oz (67.9 kg)   SpO2 94%   BMI 25.68 kg/m²     Last documented pain score (0-10 scale): Pain Level: 0  Last Weight: Wt Readings from Last 1 Encounters:   02/22/21 149 lb 9.6 oz (67.9 kg)     Mental Status:  {IP PT MENTAL STATUS:20030:::0}    IV Access:  { DIANA IV ACCESS:025080929:::0}    Nursing Mobility/ADLs:  Walking   {CHP DME ADLs:117150120:::0}  Transfer  {CHP DME ADLs:290999367:::0}  Bathing  {CHP DME ADLs:379386979:::0}  Dressing  {CHP DME ADLs:496781603:::0}  Toileting  {CHP DME ADLs:205747288:::0}  Feeding  {CHP DME ADLs:585345790:::0}  Med Admin  {CHP DME ADLs:606814985:::0}  Med Delivery   { DIANA MED Delivery:341883007:::0}    Wound Care Documentation and Therapy:  Wound 02/20/21 Coccyx Discoloration, Pink, blanchable (Active)   Wound Assessment Erythema;White Salmon/red;Superficial 02/21/21 1420   Drainage Amount None 02/21/21 1420   Odor None 02/21/21 1420   Berkley-wound Assessment Blanchable erythema 02/21/21 1420   Number of days: 2        Elimination:  Continence: Bowel: {YES / ZI:16705}  Bladder: {YES / UQ:69041}  Urinary Catheter: {Urinary Catheter:286443623:::0}   Colostomy/Ileostomy/Ileal Conduit: {YES / ME:73266}       Date of Last BM: ***    Intake/Output Summary (Last 24 hours) at 2/22/2021 1404  Last data filed at 2/22/2021 0400  Gross per 24 hour   Intake 1939.95 ml   Output 0 ml   Net 1939.95 ml     I/O last 3 completed shifts:   In: 1940 [I.V.:1940]  Out: 500 [Urine:500]    Safety Concerns:     [employer] Safety Concerns:882758818:::0}    Impairments/Disabilities:      [employer] Impairments/Disabilities:964630908:::0}    Nutrition Therapy:  Current Nutrition Therapy:   [employer] Diet List:633684479:::0}    Routes of Feeding: {CHP DME Other Feedings:867692214:::0}  Liquids: {Slp liquid thickness:31615}  Daily Fluid Restriction: {CHP DME Yes amt example:577237986:::0}  Last Modified Barium Swallow with Video (Video Swallowing Test): {Done Not Done OBKN:988484464:::8}    Treatments at the Time of Hospital Discharge:   Respiratory Treatments: ***  Oxygen Therapy:  {Therapy; copd oxygen:98782:::0}  Ventilator:    { CC Vent List:110002218:::0}    Rehab Therapies: {THERAPEUTIC INTERVENTION:6467663261}  Weight Bearing Status/Restrictions: {MH CC Weight Bearin:::0}  Other Medical Equipment (for information only, NOT a DME order):  {EQUIPMENT:568415334}  Other Treatments: ***    Patient's personal belongings (please select all that are sent with patient):  {CHP DME Belongings:333018887:::0}    RN SIGNATURE:  {Esignature:814120068:::0}    CASE MANAGEMENT/SOCIAL WORK SECTION    Inpatient Status Date: ***    Readmission Risk Assessment Score:  Readmission Risk              Risk of Unplanned Readmission:        29           Discharging to Facility/ Agency   Name:   Address:  Phone:  Fax:    Dialysis Facility (if applicable)   Name:  Address:  Dialysis Schedule:  Phone:  Fax:    / signature: {Esignature:61957:::0}    PHYSICIAN SECTION    Prognosis: {Prognosis:3260344039:::0}    Condition at Discharge: [address] [place] [address] Patient Condition:548117181:::0}    Rehab Potential (if transferring to Rehab): {Prognosis:3844499422:::0}    Recommended Labs or Other Treatments After Discharge: ***    Physician Certification: I certify the above information and transfer of Shruthi Kumar  is necessary for the continuing treatment of the diagnosis listed and that she requires {Admit to Appropriate Level of Care:27367:::0} for {GREATER/LESS:670591077} 30 days.      Update Admission H&P: {CHP DME Changes in HandP:972802244:::0}    PHYSICIAN SIGNATURE:  {Esignature:544243184:::0}

## 2021-02-22 NOTE — DISCHARGE SUMMARY
Hospital Medicine Discharge Summary      Patient Identification:   Mariano Molina   : 1955  MRN: [id number]   Account: [de-identified]      Patient's PCP: No primary care provider on file. Admit Date: 2021     Discharge Date: 2021      Admitting Physician: Esther White MD     Discharge Physician: Rand Lowe MD     Discharge Diagnoses: The patient was seen and examined on day of discharge and this discharge summary is in conjunction with any daily progress note from day of discharge. Assessment and Plan:        1. Hematemesis Amarilis Lull / Hx of UGIB due to most likely Wendy-Bejarano tear: Resolved. Triggered due to bouts of severe coughing smoking-related, patient was counseled on quitting tobacco use, referrals given for tobacco cessation clinic. At least 15 minutes were spent on counseling. Differential: PUD/GERD  a. EGD 2019 with esophageal dilation for stricture. -SLP to see  b. PTT/INR wnl. hgb 11.8 (baseline 14), MCV 90, plt 227.   c. Received 325mg ASA by ED provider. d. PPI bolus and drip. IVF. hgb q6 prn, transfuse for hgb < 7. Hold DAPT. Telemetry. Consult GI.  NPO. Pending SLP eval.  Document all vomit/BM for blood. 2. Acute Chest Pain / Hx of MI (cocaine-induced) / Nonobstructive CAD (per [facility] 2017): likely 2/2 GERD/hematemesis/ ? FMA            a. Chest pain burning in nature. Not improved with nitro in the ED, not worsened by exertion. b. Troponin neg x2. EKG shows NSR with (old) q-waves in II/III/aVF, V5-V6    c. Received 324 mg ASA in the ED. AVOID BB with chronic cocaine use  d. Trend x1 to r/o ACS with recent cocaine use yesterday. -Thus far negative. Discontinued beta-blockers on discharge. 3. Hx GERD: noted. PPI as above.   4. Essential Hypertension, controlled: Hold/discontinue BB due to chronic (since ) cocaine use.  Will start alternative anti-hypertensive if needed.   5. Hypothyroidism: resume synthroid.   6. Bipolar I / Insomnia: resume lexapro, abilify, seroquel (unclear why on 2 antipsychotics), trazodone   7. COPD (without baseline supplemental O2 use), without exacerbation: resume daliresp, tiotropium, PRN albuterol.   8. Hx CVA (2014): hold DAPT as above.   9. Hx provoked PE (2014): noted. Hold DAPT as above. 10. NIDDM2, controlled: hold metformin. Monitor daily. Goal -180 while hospitalized. Consider SSI if hyperglycemic  11. HLD: resume statin / Fenofibrate.   12. Chronic Cocaine Use: consult addiction services as patient has an interest in quitting. Avoid BB  Hospital Course:   Berlin Bergeron is a 63 y.o. female admitted to [facility] on 2/20/2021 for hematemesis. \" Elli Lebron is a 63year old white female smoker with PMH of cocaine use, bipolar 1, COPD, GERD, CVA (2019), provoked PE (2014) after shoulder surgery, HLD, HTN, NIDDM2, hypothyroidism who presented to [facility] ED on 2/20/21 c/o nonbilious vomit containing food for 2 weeks. She notes that at the end of the day she has been having significant hematemesis of dark red blood when there is nothing left in her stomach left to vomit. She notes one day of melena with formed stool. She notes 6/10 burning epigastric and midline chest pain that worsens with vomiting, nonexertional, did not improve with nitro, alleviated with morphine. She states that coughing fits have been what has been precipitating her vomiting. She has COPD and has been coughing until she gags. She denies increased sputum production or inc SOB, fever, chills.      In the ED, vitals showed: HR 68, /90, RR 18, 94% on RA and afebrile with Tmax 99. Labs showed: GFR 63, troponin neg x2, BNP wnl, LFTs wnl, Urine tox positive for cocaine, hgb 11.8, MCV 90.3, plt 227, wbc 5.8, INR 1.12, PTT 32.9. UA clean. CXR shows no acute process.  \"    During this hospital stay, patient was evaluated by gastroenterology who confirmed that recent EGD 6/20/2020 for similar complaints were negative. Patient's symptoms are resolved with supportive care, she was deemed stable for discharge with outpatient follow-up as needed. Patient was counseled on tobacco cessation which is the main trigger for coughing-induced vomiting. Patient was also made aware that she should not take beta-blockers while she is using cocaine, was discharged with discontinuing beta-blockers that she had on board PTA. Diet was advanced as tolerated, with continued GI PPI prophylaxis. Patient tolerated well. On discharge patient symptoms have been resolved, she no longer experienced any nausea or vomiting, and her hemoglobin was stable. Exam:     Vitals:  Vitals:    02/21/21 2315 02/22/21 0400 02/22/21 0945 02/22/21 1115   BP: (!) 141/102 (!) 108/58 127/61 134/67   Pulse: 65 59 64 74   Resp: 18 16 16 16   Temp: 97.9 °F (36.6 °C) 97.6 °F (36.4 °C) 97.7 °F (36.5 °C) 97.6 °F (36.4 °C)   TempSrc: Oral Oral Oral Oral   SpO2: 94% 93% 94% 94%   Weight:  149 lb 9.6 oz (67.9 kg)     Height:         Weight: Weight: 149 lb 9.6 oz (67.9 kg)     24 hour intake/output:    Intake/Output Summary (Last 24 hours) at 2/22/2021 2045  Last data filed at 2/22/2021 0400  Gross per 24 hour   Intake 950.24 ml   Output 0 ml   Net 950.24 ml         General appearance:  No apparent distress, appears stated age and cooperative. HEENT:  Normal cephalic, atraumatic without obvious deformity. Pupils equal, round, and reactive to light. Extra ocular muscles intact. Conjunctivae/corneas clear. Neck: Supple, with full range of motion. No jugular venous distention. Trachea midline. Respiratory:  Normal respiratory effort. Clear to auscultation, bilaterally without Rales/Wheezes/Rhonchi. Cardiovascular:  Regular rate and rhythm with normal S1/S2 without murmurs, rubs or gallops. Abdomen: Soft, non-tender, non-distended with normal bowel sounds. Musculoskeletal:  No clubbing, cyanosis or edema bilaterally.   Full range of motion without deformity. Skin: Skin color, texture, turgor normal.  No rashes or lesions. Neurologic:  Neurovascularly intact without any focal sensory/motor deficits. Cranial nerves: II-XII intact, grossly non-focal.  Psychiatric:  Alert and oriented, thought content appropriate, normal insight  Capillary Refill: Brisk,< 3 seconds   Peripheral Pulses: +2 palpable, equal bilaterally       Labs: For convenience and continuity at follow-up the following most recent labs are provided:      CBC:    Lab Results   Component Value Date    WBC 5.3 02/22/2021    HGB 10.9 02/22/2021    HCT 35.7 02/22/2021     02/22/2021       Renal:    Lab Results   Component Value Date     02/22/2021    K 3.6 02/22/2021     02/22/2021    CO2 21 02/22/2021    BUN 8 02/22/2021    CREATININE 0.7 02/22/2021    CALCIUM 9.7 02/22/2021    PHOS 3.2 11/17/2017         Significant Diagnostic Studies    Radiology:   XR WRIST LEFT (MIN 3 VIEWS)   Final Result   Soft tissue swelling no fracture            **This report has been created using voice recognition software. It may contain minor errors which are inherent in voice recognition technology. **      Final report electronically signed by Dr. Narayan Campos on 2/20/2021 5:37 PM      XR HAND LEFT (MIN 3 VIEWS)   Final Result   No acute osseous abnormality            **This report has been created using voice recognition software. It may contain minor errors which are inherent in voice recognition technology. **      Final report electronically signed by Dr. Narayan Campos on 2/20/2021 5:36 PM      XR CHEST (2 VW)   Final Result   Stable radiographic appearance of the chest. No evidence of an acute process. **This report has been created using voice recognition software. It may contain minor errors which are inherent in voice recognition technology. **      Final report electronically signed by Dr. Narayan Campos on 2/20/2021 5:38 PM             Consults:     [address] [place] [address] SERVICES  IP CONSULT TO GI  IP CONSULT TO HOME CARE NEEDS    Disposition: Home  Condition at Discharge: Stable    Code Status:  Prior     Patient Instructions: Activity: activity as tolerated  Diet: No diet orders on file      Follow-up visits:   BA Patino CNP  [address] [facility] [address] [place] [address]  [phone]      appt time     wear mask, photo ID, medicine list         Discharge Medications:      Levbrielle Cardoso"   Home Medication Instructions [id number]    Printed on:02/22/21 2045   Medication Information                      acetaminophen (MAPAP) 325 MG tablet  Take 650 mg by mouth every 6 hours as needed for Pain             albuterol (PROVENTIL) (2.5 MG/3ML) 0.083% nebulizer solution  Take 3 mLs by nebulization every 6 hours as needed for Wheezing             ARIPiprazole (ABILIFY) 2 MG tablet  Take 2 mg by mouth daily             aspirin 81 MG EC tablet  Take 1 tablet by mouth daily             atorvastatin (LIPITOR) 40 MG tablet  Take 40 mg by mouth nightly              clopidogrel (PLAVIX) 75 MG tablet  Take 75 mg by mouth daily             escitalopram (LEXAPRO) 20 MG tablet  Take 20 mg by mouth daily             fenofibrate 160 MG tablet  Take 145 mg by mouth daily              ferrous sulfate 325 (65 Fe) MG tablet  Take 1 tablet by mouth 2 times daily             fluticasone (FLONASE) 50 MCG/ACT nasal spray  1 spray by Each Nostril route 2 times daily             folic acid (FOLVITE) 1 MG tablet  Take 1 mg by mouth daily             hydrOXYzine (VISTARIL) 50 MG capsule  Take 1 capsule by mouth 3 times daily as needed for Itching or Anxiety             ibuprofen (IBU) 600 MG tablet  Take 1 tablet by mouth every 6 hours as needed for Pain             levothyroxine (SYNTHROID) 75 MCG tablet  Take 1 tablet by mouth daily everyday except Sunday take 150 mcg. nitroGLYCERIN (NITROSTAT) 0.4 MG SL tablet  up to max of 3 total doses.  If no relief after 1 dose, call 911.             pantoprazole (PROTONIX) 40 MG tablet  Take 1 tablet by mouth 2 times daily             potassium chloride (KLOR-CON M) 20 MEQ extended release tablet  Take 40 mEq by mouth daily              QUEtiapine (SEROQUEL XR) 300 MG extended release tablet  Take 2 tablets by mouth nightly             QUEtiapine (SEROQUEL) 200 MG tablet  Take 200 mg by mouth daily PM             Respiratory Therapy Supplies (NEBULIZER/TUBING/MOUTHPIECE) KIT  1 kit by Does not apply route every 6 hours as needed (shortness of breath)             Roflumilast (DALIRESP) 500 MCG tablet  Take 500 mcg by mouth daily             sucralfate (CARAFATE) 1 GM tablet  Take 1 tablet by mouth 4 times daily             Tiotropium Bromide Monohydrate (SPIRIVA HANDIHALER IN)  Inhale 2 puffs into the lungs daily             topiramate (TOPAMAX) 50 MG tablet  Take 1 tablet by mouth 2 times daily             traZODone (DESYREL) 100 MG tablet  Take 2 tablets by mouth nightly                 Time Spent on discharge is more than 45 minutes in the examination, evaluation, counseling and review of medications and discharge plan. Signed: Thank you No primary care provider on file. for the opportunity to be involved in this patient's care.     Electronically signed by Sadie Loya MD on 2/22/2021 at 8:45 PM

## 2021-02-22 NOTE — PLAN OF CARE
Problem: Pain:  Goal: Pain level will decrease  Description: Pain level will decrease  2/22/2021 0258 by Clara King RN  Outcome: Ongoing  2/22/2021 0253 by Clara King RN  Outcome: Ongoing  Goal: Control of acute pain  Description: Pt states pain is a 8/10 in her right abdomen. Pain is described as an ache and intermittent. Pain medication given per MAR. Pt encouraged to reposition and rest. Will continue to reassess. 2/22/2021 0258 by Clara King RN  Outcome: Ongoing  2/22/2021 0253 by Clara King RN  Outcome: Ongoing  Goal: Control of chronic pain  Description: Control of chronic pain  2/22/2021 0258 by Clara King RN  Outcome: Ongoing  2/22/2021 0253 by Clara King RN  Outcome: Ongoing     Problem: Falls - Risk of:  Goal: Will remain free from falls  Description: No falls noted this shift. Falling star protocol in place and call light within reach. Bed alarm active and nonskid socks on. Patient utilizes call light appropriately       2/22/2021 0258 by Clara King RN  Outcome: Ongoing  2/22/2021 0253 by Clara King RN  Outcome: Ongoing  Goal: Absence of physical injury  Description: Absence of physical injury  2/22/2021 0258 by Clara King RN  Outcome: Ongoing  2/22/2021 0253 by Clara King RN  Outcome: Ongoing     Problem: Bowel Function - Altered:  Goal: Bowel elimination is within specified parameters  Description: No BM this shift. Will continue to monitor. Outcome: Ongoing     Problem: Discharge Planning:  Goal: Participates in care planning  Description: Participates in care planning  Outcome: Ongoing  Goal: Discharged to appropriate level of care  Description: Pt plans to return home when medically stable. Outcome: Ongoing     Problem: Nutrition Deficit:  Goal: Ability to achieve adequate nutritional intake will improve  Description: Pt NPO. 0.9% NS running at 75. Will continue to monitor.    Outcome: Ongoing     Problem: Skin Integrity - Impaired:  Goal: Will show no infection signs and symptoms  Description: Will show no infection signs and symptoms  Outcome: Ongoing  Goal: Absence of new skin breakdown  Description: No new skin breakdown noted this shift. Patient encouraged to turn and make frequent positional changes. Will continue to monitor. Outcome: Ongoing      Care plan reviewed with patient. Patient  verbalize understanding of the plan of care and contribute to goal setting.

## 2021-02-22 NOTE — CONSULTS
Brief Intervention and Referral to Treatment Summary    Patient was provided PHQ-9, AUDIT and DAST Screening:      PHQ-9 Score: See Below   AUDIT Score:  0  DAST Score:  4    Patients substance use is considered     Substantial Risk    Patients depression is considered:     Patient stated yes to recent feelings of depression/hopelessness/feeling down. Attempted to complete PHQ-9 screening, however, pt lethargic, unable to stay awake. Brief Education Was Provided    Patient was receptive    Brief Intervention Is Provided (Only for AUDIT or DAST)     Patient reports readiness to decrease and/or stop use and a plan was discussed     Recommendations/Referrals for Brief and/or Specialized Treatment Provided to Patient     Pt current client of [facility]. Plans to continue. Also provided information on [facility]. Provided additional resources as well.

## 2021-02-22 NOTE — PROGRESS NOTES
Physician Progress Note      Esdars Clark  CSN #:                  [id number]  :                       1955  ADMIT DATE:       2021 4:07 PM  [address] [place] DATE:  RESPONDING  PROVIDER #:        Anthony Farmer MD          QUERY TEXT:    Pt admitted with CP. Pt noted to have + urine drug screen for Cocaine. If   possible, please respond below and document in your progress notes and   discharge summary if you are evaluating and /or treating any of the following: The medical record reflects the following:  Risk Factors: Hx of MI (cocaine-induced)  Clinical Indicators: Chest pain and high risk with recent cocaine use  Treatment: Troponin monitoring, Received 324 mg ASA in the ED. AVOID BB with   chronic cocaine use. Thank you. Please call if you have any questions. P) [phone]. Signed   by Kobe Moore RN Clinical , CRCR  Options provided:  -- Cocaine induced Chest pain  -- Chest pain due to CAD with unstable angina  -- Chest pain not related to Cocaine use  -- Other - I will add my own diagnosis  -- Disagree - Not applicable / Not valid  -- Disagree - Clinically unable to determine / Unknown  -- Refer to Clinical Documentation Reviewer    PROVIDER RESPONSE TEXT:    This patient has chest pain related to use of Cocaine.     Query created by: Demario Weinstein on 2021 11:52 AM      Electronically signed by:  Anthony Farmer MD 2021   11:55 AM

## 2021-02-23 LAB — MRSA SCREEN: NORMAL

## 2021-02-25 ENCOUNTER — TELEPHONE (OUTPATIENT)
Dept: PULMONOLOGY | Age: 66
End: 2021-02-25

## 2021-02-25 NOTE — TELEPHONE ENCOUNTER
Pt was a NO SHOW to appt today. I called her and she said she is sick. She will call back to REGGIE Purdy

## 2021-05-06 ENCOUNTER — HOSPITAL ENCOUNTER (EMERGENCY)
Age: 66
Discharge: HOME | End: 2021-05-06
Attending: EMERGENCY MEDICINE
Payer: MEDICAID

## 2021-05-06 ENCOUNTER — APPOINTMENT (OUTPATIENT)
Dept: GENERAL RADIOLOGY | Age: 66
End: 2021-05-06
Payer: MEDICAID

## 2021-05-06 VITALS
DIASTOLIC BLOOD PRESSURE: 70 MMHG | SYSTOLIC BLOOD PRESSURE: 128 MMHG | TEMPERATURE: 98.5 F | BODY MASS INDEX: 23.56 KG/M2 | RESPIRATION RATE: 16 BRPM | OXYGEN SATURATION: 97 % | HEIGHT: 64 IN | WEIGHT: 138 LBS | HEART RATE: 64 BPM

## 2021-05-06 DIAGNOSIS — S93.401A SPRAIN OF RIGHT ANKLE, UNSPECIFIED LIGAMENT, INITIAL ENCOUNTER: Primary | ICD-10-CM

## 2021-05-06 PROCEDURE — 73630 X-RAY EXAM OF FOOT: CPT

## 2021-05-06 PROCEDURE — 6370000000 HC RX 637 (ALT 250 FOR IP): Performed by: EMERGENCY MEDICINE

## 2021-05-06 PROCEDURE — 99282 EMERGENCY DEPT VISIT SF MDM: CPT

## 2021-05-06 PROCEDURE — 73610 X-RAY EXAM OF ANKLE: CPT

## 2021-05-06 RX ORDER — IBUPROFEN 600 MG/1
600 TABLET ORAL EVERY 6 HOURS PRN
Qty: 40 TABLET | Refills: 0 | Status: SHIPPED | OUTPATIENT
Start: 2021-05-06 | End: 2021-12-02 | Stop reason: SDUPTHER

## 2021-05-06 RX ORDER — ACETAMINOPHEN 500 MG
1000 TABLET ORAL ONCE
Status: COMPLETED | OUTPATIENT
Start: 2021-05-06 | End: 2021-05-06

## 2021-05-06 RX ADMIN — ACETAMINOPHEN 1000 MG: 500 TABLET ORAL at 13:31

## 2021-05-06 ASSESSMENT — PAIN DESCRIPTION - ORIENTATION: ORIENTATION: LEFT

## 2021-05-06 NOTE — ED PROVIDER NOTES
[place] ENCOUNTER          Pt Name: Pradeep Pichardo  MRN: [id number]  [place] 1955  Date of evaluation: 5/6/2021  Emergency Physician: Megan Wright DO    CHIEF COMPLAINT       Chief Complaint   Patient presents with    Ankle Injury     left     History obtained from the patient. HISTORY OF PRESENT ILLNESS    HPI  Pradeep Pichardo is a 63 y.o. female who presents to the emergency department for evaluation of ankle pain. Patient states she was out in the garden. She states she was doing a lot of yard work. She states her flip-flop got caught and she twisted her right ankle. She states she did not fall. She did not hit her head. No loss of consciousness. On aspirin and Plavix. The patient has no other acute complaints at this time. REVIEW OF SYSTEMS   Review of Systems   Musculoskeletal: Positive for arthralgias and gait problem (pain with ambulation). Negative for joint swelling.          PAST MEDICAL AND SURGICAL HISTORY     Past Medical History:   Diagnosis Date    Acute renal failure with acute cortical necrosis (HCC) 12/21/2019    Allergic rhinitis     Arthritis     spine    Bipolar 1 disorder ([address]     Bipolar disorder ([address]     Blood circulation, collateral     Cancer ([address] 2011    cancerous polyps removed - Dr. Jose Duke Colon polyps     COPD (chronic obstructive pulmonary disease) ([address] 7/24/2014    Coronary vasospasm ([address] 8/17/2019    Depression     Diverticulitis of colon     GERD (gastroesophageal reflux disease)     Hiatal hernia     History of arterial ischemic stroke 7/20/2019    History of colonoscopy 2002    History of kidney stones     Hx of blood clots 6/17/2014    PE and collar bone area after shoulder surgery    Hyperlipidemia     Hypertension     Liver disease     enlarged liver - damaged with alcohol in past but no cirrhosis per patient    Pneumonia 7/24/2014    Suicidal thoughts 2015 admitted to  from [address] Thyroid disease     Type 2 diabetes mellitus without complication, without long-term current use of insulin ([facility] 7/20/2019    Vomiting     Wears dentures     Wears glasses      Past Surgical History:   Procedure Laterality Date    ABDOMEN SURGERY      x4 removed cysts and ovary removed    CARDIAC SURGERY      heart caths, no stents    CARPAL TUNNEL RELEASE Bilateral 2016    CHOLECYSTECTOMY, LAPAROSCOPIC  6/12/15    Dr. Jc Ty N/A 7/13/2020    CYSTOSCOPY WITH BLADDER BIOPSIES performed by Erika Zee MD at [address], [address] Right 10/7/2004    Dr. Anthony Etienne Bilateral 2016    decompression   [facility]    Dr. Tono Sanders [facility] with [facility]    ROTATOR CUFF REPAIR  2004, 2014    right shoulder    SHOULDER SURGERY  2014    right    SKIN BIOPSY  2006    under left eye    STIMULATOR SURGERY N/A 11/4/2020    STAGE 1 INTERSTIM PLACEMENT performed by Erika Zee MD at [place] [address] 11/23/2020    PLACEMENT OF STAGE II INTERSTIM performed by Erika Zee MD at [facility]   No current facility-administered medications for this encounter. Current Outpatient Medications:     ibuprofen (IBU) 600 MG tablet, Take 1 tablet by mouth every 6 hours as needed for Pain, Disp: 40 tablet, Rfl: 0    aspirin 81 MG EC tablet, Take 1 tablet by mouth daily, Disp: 30 tablet, Rfl: 3    nitroGLYCERIN (NITROSTAT) 0.4 MG SL tablet, up to max of 3 total doses.  If no relief after 1 dose, call 911., Disp: 25 tablet, Rfl: 3    hydrOXYzine (VISTARIL) 50 MG capsule, Take 1 capsule by mouth 3 times daily as needed for Itching or Anxiety, Disp: 90 capsule, Rfl: 0    topiramate (TOPAMAX) 50 MG tablet, Take 1 tablet by mouth 2 times daily, Disp: 60 tablet, Rfl: 3    pantoprazole (PROTONIX) 40 MG tablet, Take 1 tablet by mouth 2 times daily, Disp: 60 tablet, Rfl: 2    sucralfate (CARAFATE) 1 GM tablet, Take 1 tablet by mouth 4 times daily, Disp: 120 tablet, Rfl: 3    QUEtiapine (SEROQUEL) 200 MG tablet, Take 200 mg by mouth daily PM, Disp: , Rfl:     clopidogrel (PLAVIX) 75 MG tablet, Take 75 mg by mouth daily, Disp: , Rfl:     Respiratory Therapy Supplies (NEBULIZER/TUBING/MOUTHPIECE) KIT, 1 kit by Does not apply route every 6 hours as needed (shortness of breath), Disp: 1 kit, Rfl: 1    albuterol (PROVENTIL) (2.5 MG/3ML) 0.083% nebulizer solution, Take 3 mLs by nebulization every 6 hours as needed for Wheezing, Disp: 120 each, Rfl: 0    Roflumilast (DALIRESP) 500 MCG tablet, Take 500 mcg by mouth daily, Disp: , Rfl:     Tiotropium Bromide Monohydrate (SPIRIVA HANDIHALER IN), Inhale 2 puffs into the lungs daily, Disp: , Rfl:     acetaminophen (MAPAP) 325 MG tablet, Take 650 mg by mouth every 6 hours as needed for Pain, Disp: , Rfl:     potassium chloride (KLOR-CON M) 20 MEQ extended release tablet, Take 40 mEq by mouth daily , Disp: , Rfl:     levothyroxine (SYNTHROID) 75 MCG tablet, Take 1 tablet by mouth daily everyday except Sunday take 150 mcg., Disp: 30 tablet, Rfl: 0    fluticasone (FLONASE) 50 MCG/ACT nasal spray, 1 spray by Each Nostril route 2 times daily, Disp: 1 Bottle, Rfl: 0    ferrous sulfate 325 (65 Fe) MG tablet, Take 1 tablet by mouth 2 times daily, Disp: 30 tablet, Rfl: 0    folic acid (FOLVITE) 1 MG tablet, Take 1 mg by mouth daily, Disp: , Rfl:     ARIPiprazole (ABILIFY) 2 MG tablet, Take 2 mg by mouth daily, Disp: , Rfl:     atorvastatin (LIPITOR) 40 MG tablet, Take 40 mg by mouth nightly , Disp: , Rfl:     escitalopram (LEXAPRO) 20 MG tablet, Take 20 mg by mouth daily, Disp: , Rfl:     fenofibrate 160 MG tablet, Take 145 mg by mouth daily , Disp: , Rfl:     traZODone (DESYREL) 100 MG tablet, Take 2 tablets by mouth nightly (Patient taking differently: Take 150 mg by mouth nightly 2 tabs prn), Disp: 30 tablet, Rfl: 0    QUEtiapine (SEROQUEL XR) 300 MG extended release tablet, Take 2 tablets by mouth nightly, Disp: 30 tablet, Rfl: 0      SOCIAL HISTORY     Social History     Social History Narrative    Not on file     Social History     Tobacco Use    Smoking status: Current Every Day Smoker     Packs/day: 0.50     Years: 30.00     Pack years: 15.00     Types: Cigarettes     Start date: 4/22/1977    Smokeless tobacco: Never Used   Substance Use Topics    Alcohol use: No     Comment: none for 2 years    Drug use: Yes     Frequency: 1.0 times per week     Types: Cocaine     Comment: crack today         ALLERGIES     Allergies   Allergen Reactions    Seasonal Other (See Comments)     sneezing         FAMILY HISTORY     Family History   Problem Relation Age of Onset    Cancer Mother     Heart Disease Mother     High Blood Pressure Mother     High Cholesterol Mother     Cancer Father         brain    Depression Father     Heart Disease Father     High Cholesterol Father     Mental Illness Father     Cancer Sister     Heart Attack Sister     Heart Disease Maternal Uncle     High Cholesterol Maternal Uncle     Diabetes Maternal Grandmother     Heart Disease Maternal Grandmother     High Cholesterol Maternal Grandmother     Early Death Maternal Grandfather     Heart Disease Maternal Grandfather     High Cholesterol Maternal Grandfather     Stroke Maternal Grandfather     Heart Disease Paternal Grandmother     High Cholesterol Paternal Grandmother     Heart Disease Paternal Grandfather     High Cholesterol Paternal Grandfather          PHYSICAL EXAM     ED Triage Vitals [05/06/21 1223]   BP Temp Temp Source Pulse Resp SpO2 Height Weight   128/70 98.5 °F (36.9 °C) Oral 64 16 97 % 5' 4\" (1.626 m) 138 lb (62.6 kg)         Additional Vital Signs:  Vitals:    05/06/21 1223   BP: 128/70   Pulse: 64   Resp: 16   Temp: 98.5 °F (36.9 °C)   SpO2: 97%       Physical Exam  Vitals signs and nursing note reviewed. Constitutional:       General: She is not in acute distress. Appearance: She is not ill-appearing. HENT:      Head: Normocephalic and atraumatic. Nose: No congestion or rhinorrhea. Mouth/Throat:      Mouth: Mucous membranes are moist.   Cardiovascular:      Rate and Rhythm: Normal rate and regular rhythm. Pulses: Normal pulses. Heart sounds: Normal heart sounds. Pulmonary:      Effort: Pulmonary effort is normal.      Breath sounds: Normal breath sounds. Musculoskeletal:      Right ankle: Tenderness. Lateral malleolus tenderness found. No medial malleolus, no AITFL, no CF ligament and no head of 5th metatarsal tenderness found. Right foot: Normal range of motion. No tenderness, bony tenderness or swelling. Neurological:      Mental Status: She is alert. Psychiatric:         Attention and Perception: Attention normal.         Mood and Affect: Affect normal.         Initial vital signs and nursing assessment reviewed and normal.   Pulsoximetry is normal per my interpretation. MEDICAL DECISION MAKING   Initial Assessment: Given the patient's above chief complaint and findings on history and physical examination, I thought it was appropriate to consider the following emergency medical conditions:ankle sprain, fracture, head injury, foot frx Although some of these diagnoses are unlikely they were considered in my medical decision making. Plan: Symptomatic treatment with tylenol, ICE, Bracing and reassess         ED RESULTS   Laboratory results:  Labs Reviewed - No data to display    Radiologic studies results:  XR ANKLE RIGHT (MIN 3 VIEWS)   Final Result   Mild soft tissue swelling. Otherwise normal foot and ankle. No fracture is seen. **This report has been created using voice recognition software. It may contain minor errors which are inherent in voice recognition technology. **      Final report electronically signed by  Qing Parra on 5/6/2021 1:20 PM      XR FOOT RIGHT (MIN 3 VIEWS)   Final Result   Mild soft tissue swelling. Otherwise normal foot and ankle. No fracture is seen. **This report has been created using voice recognition software. It may contain minor errors which are inherent in voice recognition technology. **      Final report electronically signed by Dr. Qing Parra on 5/6/2021 1:20 PM          ED Medications administered this visit:   Medications   acetaminophen (TYLENOL) tablet 1,000 mg (1,000 mg Oral Given 5/6/21 1331)         ED COURSE       I estimate there is LOW risk for COMPARTMENT SYNDROME, DEEP VENOUS THROMBOSIS, SEPTIC ARTHRITIS, TENDON OR NEUROVASCULAR INJURY, thus I consider the discharge disposition reasonable. The patient and/or family and I have discussed the diagnosis and risks, and we agree with discharging home to follow-up with their primary doctor or the referral orthopedist. We also discussed returning to the Emergency Department immediately if new or worsening symptoms occur. We have discussed the symptoms which are most concerning (e.g., changing or worsening pain, numbness, weakness) that necessitate immediate return. MEDICATION CHANGES     DISCHARGE MEDICATIONS:  Discharge Medication List as of 5/6/2021  1:43 PM               FINAL DISPOSITION     Final diagnoses:   Sprain of right ankle, unspecified ligament, initial encounter     Condition: condition: good  Dispo: Discharge to home    PATIENT REFERRED TO:  Martha Adams DPM  [address] [facility] [address]  [phone]    Schedule an appointment as soon as possible for a visit in 2 days        Critical Care Time   None      This transcription was electronically signed. Parts of this transcriptions may have been dictated by use of voice recognition software and electronically transcribed, and parts may have been transcribed with the assistance of an ED scribe.  The transcription may contain errors not detected in

## 2021-05-06 NOTE — ED TRIAGE NOTES
Pt presents to the ED via ED lobby with c/o left foot and ankle injury. Pt states she was doing yard work and tripped and twisted her foot.  Pt states she is able to bear some weight on affected foot

## 2021-06-16 ENCOUNTER — OFFICE VISIT (OUTPATIENT)
Dept: CARDIOLOGY CLINIC | Age: 66
End: 2021-06-16
Payer: MEDICAID

## 2021-06-16 VITALS
BODY MASS INDEX: 23.66 KG/M2 | HEART RATE: 61 BPM | HEIGHT: 64 IN | DIASTOLIC BLOOD PRESSURE: 66 MMHG | WEIGHT: 138.6 LBS | SYSTOLIC BLOOD PRESSURE: 118 MMHG

## 2021-06-16 DIAGNOSIS — R01.1 SYSTOLIC MURMUR: ICD-10-CM

## 2021-06-16 DIAGNOSIS — R07.9 CHEST PAIN, UNSPECIFIED TYPE: Primary | ICD-10-CM

## 2021-06-16 PROCEDURE — 99213 OFFICE O/P EST LOW 20 MIN: CPT | Performed by: INTERNAL MEDICINE

## 2021-06-16 RX ORDER — CARVEDILOL 3.12 MG/1
TABLET ORAL
Status: ON HOLD | COMMUNITY
Start: 2020-07-01

## 2021-06-16 RX ORDER — FAMOTIDINE 20 MG/1
20 TABLET, FILM COATED ORAL DAILY
Status: ON HOLD | COMMUNITY

## 2021-06-16 NOTE — PROGRESS NOTES
[address] [facility] [address]  [place] [address]  Dept: [phone]  Dept Fax: [phone]  Loc: [phone]    Visit Date: 6/16/2021    Ms. Vianca Cooley is a 63 y.o. female  who presented for:  Chief Complaint   Patient presents with    1 Year Follow Up    Coronary Artery Disease       HPI:   HPI   59 yo F RCA 50% on DAPT with OMT, follows up. She has been having SSCP, left arm discomfort. Was supposed to get EGD, but has not followed-up. No hx of PCI. 10/10, 3x/day  Pain at any time, she has hold her bra to get better and she has to rest. She is on ASA. Lasts seconds to minutes. No NTG SL prn use. Last stress negative 2019. Current Outpatient Medications:     carvedilol (COREG) 3.125 MG tablet, TAKE 1 TABLET BY MOUTH TWICE DAILY, Disp: , Rfl:     famotidine (PEPCID) 20 MG tablet, Take 20 mg by mouth daily, Disp: , Rfl:     aspirin 81 MG EC tablet, Take 1 tablet by mouth daily, Disp: 30 tablet, Rfl: 3    nitroGLYCERIN (NITROSTAT) 0.4 MG SL tablet, up to max of 3 total doses.  If no relief after 1 dose, call 911., Disp: 25 tablet, Rfl: 3    hydrOXYzine (VISTARIL) 50 MG capsule, Take 1 capsule by mouth 3 times daily as needed for Itching or Anxiety, Disp: 90 capsule, Rfl: 0    topiramate (TOPAMAX) 50 MG tablet, Take 1 tablet by mouth 2 times daily, Disp: 60 tablet, Rfl: 3    sucralfate (CARAFATE) 1 GM tablet, Take 1 tablet by mouth 4 times daily, Disp: 120 tablet, Rfl: 3    QUEtiapine (SEROQUEL) 200 MG tablet, Take 200 mg by mouth daily PM, Disp: , Rfl:     clopidogrel (PLAVIX) 75 MG tablet, Take 75 mg by mouth daily, Disp: , Rfl:     Respiratory Therapy Supplies (NEBULIZER/TUBING/MOUTHPIECE) KIT, 1 kit by Does not apply route every 6 hours as needed (shortness of breath), Disp: 1 kit, Rfl: 1    albuterol (PROVENTIL) (2.5 MG/3ML) 0.083% nebulizer solution, Take 3 mLs by nebulization every 6 hours as needed for Wheezing, Disp: 120 each, Rfl: 0    Roflumilast (DALIRESP) 500 MCG tablet, Take 500 mcg by mouth daily, Disp: , Rfl:     Tiotropium Bromide Monohydrate (SPIRIVA HANDIHALER IN), Inhale 2 puffs into the lungs daily, Disp: , Rfl:     acetaminophen (MAPAP) 325 MG tablet, Take 650 mg by mouth every 6 hours as needed for Pain, Disp: , Rfl:     potassium chloride (KLOR-CON M) 20 MEQ extended release tablet, Take 40 mEq by mouth daily , Disp: , Rfl:     levothyroxine (SYNTHROID) 75 MCG tablet, Take 1 tablet by mouth daily everyday except Sunday take 150 mcg., Disp: 30 tablet, Rfl: 0    fluticasone (FLONASE) 50 MCG/ACT nasal spray, 1 spray by Each Nostril route 2 times daily, Disp: 1 Bottle, Rfl: 0    ferrous sulfate 325 (65 Fe) MG tablet, Take 1 tablet by mouth 2 times daily, Disp: 30 tablet, Rfl: 0    folic acid (FOLVITE) 1 MG tablet, Take 1 mg by mouth daily, Disp: , Rfl:     ARIPiprazole (ABILIFY) 2 MG tablet, Take 2 mg by mouth daily, Disp: , Rfl:     atorvastatin (LIPITOR) 40 MG tablet, Take 40 mg by mouth nightly , Disp: , Rfl:     escitalopram (LEXAPRO) 20 MG tablet, Take 30 mg by mouth daily , Disp: , Rfl:     fenofibrate 160 MG tablet, Take 145 mg by mouth daily , Disp: , Rfl:     traZODone (DESYREL) 100 MG tablet, Take 2 tablets by mouth nightly (Patient taking differently: Take 150 mg by mouth nightly 2 tabs prn), Disp: 30 tablet, Rfl: 0    QUEtiapine (SEROQUEL XR) 300 MG extended release tablet, Take 2 tablets by mouth nightly (Patient taking differently: Take 400 mg by mouth nightly ), Disp: 30 tablet, Rfl: 0    ibuprofen (IBU) 600 MG tablet, Take 1 tablet by mouth every 6 hours as needed for Pain, Disp: 40 tablet, Rfl: 0    Past Medical History  David Solis  has a past medical history of Acute renal failure with acute cortical necrosis (HCC), Allergic rhinitis, Arthritis, Bipolar 1 disorder ([facility] Bipolar disorder ([facility] Blood circulation, collateral, Cancer ([facility] Colon polyps, COPD (chronic obstructive pulmonary disease) ([facility] Coronary vasospasm (HCC), Depression, Diverticulitis of colon, GERD (gastroesophageal reflux disease), Hiatal hernia, History of arterial ischemic stroke, History of colonoscopy, History of kidney stones, Hx of blood clots, Hyperlipidemia, Hypertension, Liver disease, Pneumonia, Suicidal thoughts, Thyroid disease, Type 2 diabetes mellitus without complication, without long-term current use of insulin ([facility] Vomiting, Wears dentures, and Wears glasses. Social History  Ronell Sicard  reports that she has been smoking cigarettes. She started smoking about 44 years ago. She has a 30.00 pack-year smoking history. She has never used smokeless tobacco. She reports current drug use. Frequency: 1.00 time per week. Drug: Cocaine. She reports that she does not drink alcohol. Family History  Ronell Sicard family history includes Cancer in her father, mother, and sister; Depression in her father; Diabetes in her maternal grandmother; Early Death in her maternal grandfather; Heart Attack in her sister; Heart Disease in her father, maternal grandfather, maternal grandmother, maternal uncle, mother, paternal grandfather, and paternal grandmother; High Blood Pressure in her mother; High Cholesterol in her father, maternal grandfather, maternal grandmother, maternal uncle, mother, paternal grandfather, and paternal grandmother; Mental Illness in her father; Stroke in her maternal grandfather. There is no family history of bicuspid aortic valve, aneurysms, heart transplant, pacemakers, defibrillators, or sudden cardiac death.       Past Surgical History   Past Surgical History:   Procedure Laterality Date    ABDOMEN SURGERY      x4 removed cysts and ovary removed    CARDIAC SURGERY      heart caths, no stents    CARPAL TUNNEL RELEASE Bilateral 2016    CHOLECYSTECTOMY, LAPAROSCOPIC  6/12/15    Dr. Gaston Hui    COLONOSCOPY  2011    CYSTOSCOPY W BIOPSY OF BLADDER N/A 7/13/2020    CYSTOSCOPY WITH BLADDER BIOPSIES performed by Jamison Moss MD at [address], [address] Right 10/7/2004    Dr. Ricky Muñoz Bilateral 2016    decompression   [facility]    Dr. Yulia Perdomo [facility] with [address] [facility]    ROTATOR CUFF REPAIR  2004, 2014    right shoulder    SHOULDER SURGERY  2014    right    SKIN BIOPSY  2006    under left eye    STIMULATOR SURGERY N/A 11/4/2020    STAGE 1 INTERSTIM PLACEMENT performed by Jamison Moss MD at [place] [address] 11/23/2020    PLACEMENT OF STAGE II INTERSTIM performed by Jamison Moss MD at [address]   Constitutional: Negative for chills and fever  HENT: Negative for congestion, sinus pressure, sneezing and sore throat. Eyes: Negative for pain, discharge, redness and itching. Respiratory: Negative for apnea, cough  Gastrointestinal: Negative for blood in stool, constipation, diarrhea   Endocrine: Negative for cold intolerance, heat intolerance, polydipsia. Genitourinary: Negative for dysuria, enuresis, flank pain and hematuria. Musculoskeletal: Negative for arthralgias, joint swelling and neck pain. Neurological: Negative for numbness and headaches. Psychiatric/Behavioral: Negative for agitation, confusion, decreased concentration and dysphoric mood. Objective:     /66   Pulse 61   Ht 5' 4\" (1.626 m)   Wt 138 lb 9.6 oz (62.9 kg)   BMI 23.79 kg/m²     Wt Readings from Last 3 Encounters:   06/16/21 138 lb 9.6 oz (62.9 kg)   05/06/21 138 lb (62.6 kg)   02/22/21 149 lb 9.6 oz (67.9 kg)     BP Readings from Last 3 Encounters:   06/16/21 118/66   05/06/21 128/70   02/22/21 134/67       Nursing note and vitals reviewed. Physical Exam   Constitutional: Oriented to person, place, and time. Appears well-developed and well-nourished. HENT:   Head: Normocephalic and atraumatic. Eyes: EOM are normal. Pupils are equal, round, and reactive to light. Valve  The mitral valve structure was normal with normal leaflet separation. DOPPLER: The transmitral velocity was within the normal range with no  evidence for mitral stenosis. There was trivial mitral regurgitation. Aortic Valve  The aortic valve was trileaflet with normal thickness and cuspal  separation. DOPPLER: Transaortic velocity was within the normal range with  no evidence of aortic stenosis. There was no evidence of aortic  regurgitation. Tricuspid Valve  The tricuspid valve structure was normal with normal leaflet separation. DOPPLER: There was no evidence of tricuspid stenosis. There was trivial  tricuspid regurgitation. Pulmonic Valve  The pulmonic valve was not well visualized . Left Atrium  Left atrial size was normal.    Left Ventricle  Normal left ventricle size and systolic function. Ejection fraction was  estimated at 60%. Doppler parameters were consistent with abnormal left ventricular  relaxation (grade 1 diastolic dysfunction). Right Atrium  Right atrial size was normal.    Right Ventricle  The right ventricular size was normal.    Pericardial Effusion  The pericardium was normal in appearance with no evidence of a pericardial  effusion. Pleural Effusion  No evidence of pleural effusion. Aorta / Great Vessels  Aortic root dimension within normal limits.     M-Mode/2D Measurements & Calculations    LV Diastolic    LV Systolic Dimension: 2.4  AV Cusp Separation: 1.9 cmLA  Dimension: 3.8  cm                          Dimension: 3.4 cmAO Root  cm              LV Volume Diastolic: 62 ml  Dimension: 3.1 cm  LV FS:36.8 %    LV Volume Systolic: 22.4 ml  LV PW           LV EDV/LV EDV Index: 62  Diastolic: 1.2  CP/09 B^8MS ESV/LV ESV  cm              Index: 20.2 ml/11 m^2       RV Diastolic Dimension: 3.5 cm  Septum          EF Calculated: 16.1 %  Diastolic: 1.2                              LA/Aorta: 1.1  cm    Doppler Measurements & Calculations    MV Peak E-Wave: 79 cm/s     AV

## 2021-06-17 ENCOUNTER — OFFICE VISIT (OUTPATIENT)
Dept: UROLOGY | Age: 66
End: 2021-06-17
Payer: MEDICAID

## 2021-06-17 VITALS — WEIGHT: 138 LBS | BODY MASS INDEX: 23.56 KG/M2 | RESPIRATION RATE: 15 BRPM | HEIGHT: 64 IN

## 2021-06-17 DIAGNOSIS — N39.41 URGE INCONTINENCE OF URINE: Primary | ICD-10-CM

## 2021-06-17 DIAGNOSIS — N20.0 NEPHROLITHIASIS: ICD-10-CM

## 2021-06-17 DIAGNOSIS — R35.0 URINARY FREQUENCY: ICD-10-CM

## 2021-06-17 DIAGNOSIS — N39.3 STRESS INCONTINENCE IN FEMALE: ICD-10-CM

## 2021-06-17 LAB
BILIRUBIN URINE: NEGATIVE
BLOOD URINE, POC: ABNORMAL
CHARACTER, URINE: CLEAR
COLOR, URINE: YELLOW
GLUCOSE URINE: NEGATIVE MG/DL
KETONES, URINE: NEGATIVE
LEUKOCYTE CLUMPS, URINE: ABNORMAL
NITRITE, URINE: NEGATIVE
PH, URINE: 7.5 (ref 5–9)
PROTEIN, URINE: NEGATIVE MG/DL
SPECIFIC GRAVITY, URINE: 1.02 (ref 1–1.03)
UROBILINOGEN, URINE: 0.2 EU/DL (ref 0–1)

## 2021-06-17 PROCEDURE — 81003 URINALYSIS AUTO W/O SCOPE: CPT | Performed by: UROLOGY

## 2021-06-17 PROCEDURE — 99214 OFFICE O/P EST MOD 30 MIN: CPT | Performed by: UROLOGY

## 2021-06-17 RX ORDER — SOLIFENACIN SUCCINATE 10 MG/1
10 TABLET, FILM COATED ORAL DAILY
Qty: 30 TABLET | Refills: 5 | Status: SHIPPED | OUTPATIENT
Start: 2021-06-17 | End: 2021-10-08 | Stop reason: SDUPTHER

## 2021-06-17 NOTE — PROGRESS NOTES
Narinder Valdes MD  Urology Clinic office visit    Patient:  Zully Willett  YOB: 1955  Date: 6/17/2021    HISTORY OF PRESENT ILLNESS:   The patient is a 63 y.o. female who presents today for follow-up for the following problem(s):      1. Urge incontinence of urine    2. Nephrolithiasis    3. Urinary frequency    4. Stress incontinence in female         Overall the problem(s) : are worsening. Associated Symptoms: No dysuria, gross hematuria. Pain Severity:      Summary of old records: 65-year-old white female returns today after undergoing permanent sacral nerve stimulator placement on 11/23/2020. Had a fall post operatively  Additional History:   Does not think device is working  She reports urgency, urge incontinence  3 pads a day  Also JEFF, mixed    Imaging Reviewed during this Office Visit:   (results were independently reviewed by physician and radiology report verified)  I independently reviewed and verified the images and reports from:    XR ANKLE RIGHT (MIN 3 VIEWS)    Result Date: 5/6/2021  PROCEDURE: XR FOOT RIGHT (MIN 3 VIEWS), XR ANKLE RIGHT (MIN 3 VIEWS) CLINICAL INFORMATION: Trauma by fall. Twisted foot and ankle. COMPARISON: No prior study. TECHNIQUE: 4 views of the right foot and 3 standard views of the right ankle were obtained. Were obtained. FINDINGS: RIGHT FOOT: No fracture or dislocation is seen. There is a small plantar calcaneal spur. RIGHT ANKLE: No fracture or dislocation is seen. The ankle mortise is intact. There is mild soft tissue swelling anteriorly. Mild soft tissue swelling. Otherwise normal foot and ankle. No fracture is seen. **This report has been created using voice recognition software. It may contain minor errors which are inherent in voice recognition technology. ** Final report electronically signed by Dr. Twan Marquez on 5/6/2021 1:20 PM    XR FOOT RIGHT (MIN 3 VIEWS)    Result Date: 5/6/2021  PROCEDURE: XR FOOT RIGHT (MIN 3 VIEWS), XR ANKLE RIGHT (MIN 3 VIEWS) CLINICAL INFORMATION: Trauma by fall. Twisted foot and ankle. COMPARISON: No prior study. TECHNIQUE: 4 views of the right foot and 3 standard views of the right ankle were obtained. Were obtained. FINDINGS: RIGHT FOOT: No fracture or dislocation is seen. There is a small plantar calcaneal spur. RIGHT ANKLE: No fracture or dislocation is seen. The ankle mortise is intact. There is mild soft tissue swelling anteriorly. Mild soft tissue swelling. Otherwise normal foot and ankle. No fracture is seen. **This report has been created using voice recognition software. It may contain minor errors which are inherent in voice recognition technology. ** Final report electronically signed by Dr. Stephanie Contreras on 5/6/2021 1:20 PM      Urinalysis today:  Results for POC orders placed in visit on 06/17/21   POCT Urinalysis No Micro (Auto)   Result Value Ref Range    Glucose, Ur Negative NEGATIVE mg/dl    Bilirubin Urine Negative     Ketones, Urine Negative NEGATIVE    Specific Gravity, Urine 1.020 1.002 - 1.030    Blood, UA POC Trace-intact NEGATIVE    pH, Urine 7.50 5.0 - 9.0    Protein, Urine Negative NEGATIVE mg/dl    Urobilinogen, Urine 0.20 0.0 - 1.0 eu/dl    Nitrite, Urine Negative NEGATIVE    Leukocyte Clumps, Urine Moderate (A) NEGATIVE    Color, Urine Yellow [id number]    Character, Urine Clear [id number].[id number]       Last BUN and creatinine:  Lab Results   Component Value Date    BUN 8 02/22/2021     Lab Results   Component Value Date    CREATININE 0.7 02/22/2021       PAST MEDICAL, FAMILY AND SOCIAL HISTORY UPDATE:  Past Medical History:   Diagnosis Date    Acute renal failure with acute cortical necrosis (HCC) 12/21/2019    Allergic rhinitis     Arthritis     spine    Bipolar 1 disorder (HCC)     [facility] [address]     Blood circulation, collateral     Cancer ([facility] [address] 2011    cancerous polyps removed - Dr. Diego Jones Colon polyps     COPD (chronic obstructive pulmonary disease) ([address]  Heart Disease Father     High Cholesterol Father     Mental Illness Father     Cancer Sister     Heart Attack Sister     Heart Disease Maternal Uncle     High Cholesterol Maternal Uncle     Diabetes Maternal Grandmother     Heart Disease Maternal Grandmother     High Cholesterol Maternal Grandmother     Early Death Maternal Grandfather     Heart Disease Maternal Grandfather     High Cholesterol Maternal Grandfather     Stroke Maternal Grandfather     Heart Disease Paternal Grandmother     High Cholesterol Paternal Grandmother     Heart Disease Paternal Grandfather     High Cholesterol Paternal Grandfather      Outpatient Medications Marked as Taking for the 6/17/21 encounter (Office Visit) with Elizabeth Fonseca MD   Medication Sig Dispense Refill    solifenacin (VESICARE) 10 MG tablet Take 1 tablet by mouth daily 30 tablet 5    carvedilol (COREG) 3.125 MG tablet TAKE 1 TABLET BY MOUTH TWICE DAILY      famotidine (PEPCID) 20 MG tablet Take 20 mg by mouth daily      aspirin 81 MG EC tablet Take 1 tablet by mouth daily 30 tablet 3    nitroGLYCERIN (NITROSTAT) 0.4 MG SL tablet up to max of 3 total doses.  If no relief after 1 dose, call 911. 25 tablet 3    hydrOXYzine (VISTARIL) 50 MG capsule Take 1 capsule by mouth 3 times daily as needed for Itching or Anxiety 90 capsule 0    topiramate (TOPAMAX) 50 MG tablet Take 1 tablet by mouth 2 times daily 60 tablet 3    sucralfate (CARAFATE) 1 GM tablet Take 1 tablet by mouth 4 times daily 120 tablet 3    QUEtiapine (SEROQUEL) 200 MG tablet Take 200 mg by mouth daily PM      clopidogrel (PLAVIX) 75 MG tablet Take 75 mg by mouth daily      Respiratory Therapy Supplies (NEBULIZER/TUBING/MOUTHPIECE) KIT 1 kit by Does not apply route every 6 hours as needed (shortness of breath) 1 kit 1    albuterol (PROVENTIL) (2.5 MG/3ML) 0.083% nebulizer solution Take 3 mLs by nebulization every 6 hours as needed for Wheezing 120 each 0    Roflumilast (DALIRESP) 500 MCG tablet Take 500 mcg by mouth daily      Tiotropium Bromide Monohydrate (SPIRIVA HANDIHALER IN) Inhale 2 puffs into the lungs daily      acetaminophen (MAPAP) 325 MG tablet Take 650 mg by mouth every 6 hours as needed for Pain      potassium chloride (KLOR-CON M) 20 MEQ extended release tablet Take 40 mEq by mouth daily       levothyroxine (SYNTHROID) 75 MCG tablet Take 1 tablet by mouth daily everyday except Sunday take 150 mcg.  30 tablet 0    fluticasone (FLONASE) 50 MCG/ACT nasal spray 1 spray by Each Nostril route 2 times daily 1 Bottle 0    ferrous sulfate 325 (65 Fe) MG tablet Take 1 tablet by mouth 2 times daily 30 tablet 0    folic acid (FOLVITE) 1 MG tablet Take 1 mg by mouth daily      ARIPiprazole (ABILIFY) 2 MG tablet Take 2 mg by mouth daily      atorvastatin (LIPITOR) 40 MG tablet Take 40 mg by mouth nightly       escitalopram (LEXAPRO) 20 MG tablet Take 30 mg by mouth daily       fenofibrate 160 MG tablet Take 145 mg by mouth daily       traZODone (DESYREL) 100 MG tablet Take 2 tablets by mouth nightly (Patient taking differently: Take 150 mg by mouth nightly 2 tabs prn) 30 tablet 0    QUEtiapine (SEROQUEL XR) 300 MG extended release tablet Take 2 tablets by mouth nightly (Patient taking differently: Take 400 mg by mouth nightly ) 30 tablet 0       Seasonal  Social History     Tobacco Use   Smoking Status Current Every Day Smoker    Packs/day: 1.00    Years: 30.00    Pack years: 30.00    Types: Cigarettes    Start date: 4/22/1977   Smokeless Tobacco Never Used       Social History     Substance and Sexual Activity   Alcohol Use No    Comment: none for 2 years       REVIEW OF SYSTEMS:  Constitutional: negative  Eyes: negative  Respiratory: negative  Cardiovascular: negative  Gastrointestinal: negative  Genitourinary: negative except for what is in HPI  Musculoskeletal: negative  Skin: negative   Neurological: negative  Hematological/Lymphatic: negative  Psychological: negative    Physical Exam:      Vitals:    06/17/21 1407   Resp: 15     Patient is a 63 y.o. female in no acute distress and alert and oriented to person, place and time. NAD, Alert  Non labored respiration  Normal peripheral pulses  Soft, non tender  Skin-warm and dry  Psych- normal mood and affect    Assessment and Plan      1. Urge incontinence of urine    2. Nephrolithiasis    3. Urinary frequency    4.  Stress incontinence in female           Plan:       Arrange for [employer] interrogation of device  Will arrange for cystoscopy and pelvic exam to eval JEFF more  Vesicare in the meantime           Prescriptions Ordered:  Orders Placed This Encounter   Medications    solifenacin (VESICARE) 10 MG tablet     Sig: Take 1 tablet by mouth daily     Dispense:  30 tablet     Refill:  5      Orders Placed:  Orders Placed This Encounter   Procedures    POCT Urinalysis No Micro (Auto)            MD Priscilla Nicolas M.D, MD [facility]

## 2021-07-05 NOTE — PROGRESS NOTES
I have personally verified, reviewed, released, signed, authenticated, authorized, confirmed,finalized, and approved the actions of the [facility]. Hx of urgency, frequency, urge incontinence. Also has stress urinary incontinence. Pt is s/p interstim placement by Dr. Regla Nuñez on 11/23/2020. She had a fall in December with XR reportedly showing good lead placement. Pt is here today for evaluation of her interstim device to assure its function is working at maximum potential. Augustine Grya started 6/17/21. Scheduled for cystoscopy and pelvic exam on 7/9/21 with Dr. Nolvia Perez. See note from [address] [place] [address]. No changes made--see Alvaro in one month.

## 2021-07-06 ENCOUNTER — NURSE ONLY (OUTPATIENT)
Dept: UROLOGY | Age: 66
End: 2021-07-06

## 2021-07-06 DIAGNOSIS — N32.81 OAB (OVERACTIVE BLADDER): Primary | ICD-10-CM

## 2021-07-06 PROCEDURE — 99999 PR OFFICE/OUTPT VISIT,PROCEDURE ONLY: CPT | Performed by: NURSE PRACTITIONER

## 2021-07-06 NOTE — PROGRESS NOTES
Per Balwinder Toribio, [employer] Rep, \" no changes made. Pt didn't have  charged. Pt to see Balwinder Toribio back in 1 month with 2-day voiding diary.

## 2021-07-07 ENCOUNTER — HOSPITAL ENCOUNTER (OUTPATIENT)
Dept: NON INVASIVE DIAGNOSTICS | Age: 66
Discharge: HOME | End: 2021-07-07
Payer: MEDICAID

## 2021-07-07 VITALS — WEIGHT: 135 LBS | BODY MASS INDEX: 23.05 KG/M2 | HEIGHT: 64 IN

## 2021-07-07 DIAGNOSIS — R01.1 SYSTOLIC MURMUR: ICD-10-CM

## 2021-07-07 DIAGNOSIS — R07.9 CHEST PAIN, UNSPECIFIED TYPE: ICD-10-CM

## 2021-07-07 LAB
LV EF: 63 %
LVEF MODALITY: NORMAL

## 2021-07-07 PROCEDURE — 78452 HT MUSCLE IMAGE SPECT MULT: CPT

## 2021-07-07 PROCEDURE — 6360000002 HC RX W HCPCS

## 2021-07-07 PROCEDURE — 3430000000 HC RX DIAGNOSTIC RADIOPHARMACEUTICAL: Performed by: INTERNAL MEDICINE

## 2021-07-07 PROCEDURE — 93306 TTE W/DOPPLER COMPLETE: CPT

## 2021-07-07 PROCEDURE — A9500 TC99M SESTAMIBI: HCPCS | Performed by: INTERNAL MEDICINE

## 2021-07-07 PROCEDURE — 93017 CV STRESS TEST TRACING ONLY: CPT | Performed by: INTERNAL MEDICINE

## 2021-07-07 RX ADMIN — Medication 31.4 MILLICURIE: at 11:32

## 2021-07-07 RX ADMIN — Medication 9.1 MILLICURIE: at 10:30

## 2021-07-08 ENCOUNTER — TELEPHONE (OUTPATIENT)
Dept: CARDIOLOGY CLINIC | Age: 66
End: 2021-07-08

## 2021-07-08 NOTE — TELEPHONE ENCOUNTER
Results of echo  Normal left ventricular size and systolic function. There were no regional wall motion abnormalities. Wall thickness was within normal limits. Ejection fraction was estimated at 60-65%. Doppler parameters were consistent with abnormal left ventricular   relaxation (grade 1 diastolic dysfunction). Severe LVOT obstruction of ~ 5.0 m/s. There was mild aortic regurgitation. The mitral valve structure demonstrated calcification of the posterior   leaflet. No results of stress test yet.

## 2021-07-09 PROCEDURE — 93018 CV STRESS TEST I&R ONLY: CPT | Performed by: INTERNAL MEDICINE

## 2021-07-09 PROCEDURE — 78452 HT MUSCLE IMAGE SPECT MULT: CPT | Performed by: INTERNAL MEDICINE

## 2021-07-09 PROCEDURE — 93016 CV STRESS TEST SUPVJ ONLY: CPT | Performed by: INTERNAL MEDICINE

## 2021-07-10 NOTE — TELEPHONE ENCOUNTER
Tell her to stay hydrated  If she is not on BB, add Toprol 25 mg XL  Findings are not likely cause of pain, and stress is negative  Would f/u PCP for further evaluation

## 2021-08-06 ENCOUNTER — NURSE ONLY (OUTPATIENT)
Dept: UROLOGY | Age: 66
End: 2021-08-06

## 2021-08-06 PROCEDURE — 99999 PR OFFICE/OUTPT VISIT,PROCEDURE ONLY: CPT | Performed by: UROLOGY

## 2021-08-06 NOTE — PROGRESS NOTES
Per Zeyad Caldwell, Interstim Rep, \" Moved patient from program 6 to program 3 @ 2.2. Pt to f/u in 1 month w/2 day voiding diary. \"

## 2021-08-17 ENCOUNTER — HOSPITAL ENCOUNTER (OUTPATIENT)
Dept: CT IMAGING | Age: 66
Discharge: HOME | End: 2021-08-17
Payer: MEDICAID

## 2021-08-17 DIAGNOSIS — F17.200 TOBACCO USE DISORDER: ICD-10-CM

## 2021-08-17 PROCEDURE — 71271 CT THORAX LUNG CANCER SCR C-: CPT

## 2021-09-03 ENCOUNTER — NURSE ONLY (OUTPATIENT)
Dept: UROLOGY | Age: 66
End: 2021-09-03

## 2021-09-03 DIAGNOSIS — N39.490 URINARY INCONTINENCE, OVERFLOW: ICD-10-CM

## 2021-09-03 PROCEDURE — 99999 PR OFFICE/OUTPT VISIT,PROCEDURE ONLY: CPT | Performed by: UROLOGY

## 2021-09-03 NOTE — PROGRESS NOTES
Patient is here today for a visit with João Banda the interstim representative. Initial program patient is on P3 at 2.2V. Patient says device has been mostly off. Voiding about 8 times a day with leaks about 1-2 times a day. Most all programming options stimulated the right toe for the patient. If amp is strong enough, stimulation of vaginal area is achieved, but then right toe is too stimulated. Left program on [employer] with a lower pulse width. Will discuss with Dr. Arabella Lund. Recommend to follow up appointment with Dr. Arabella Lund. Patient scheduled for repeat cysto and pelvic exam on October 08,2021 with Dr. Arabella Lund.

## 2021-10-08 ENCOUNTER — PROCEDURE VISIT (OUTPATIENT)
Dept: UROLOGY | Age: 66
End: 2021-10-08
Payer: MEDICAID

## 2021-10-08 VITALS — RESPIRATION RATE: 14 BRPM | BODY MASS INDEX: 23.05 KG/M2 | HEIGHT: 64 IN | WEIGHT: 135 LBS

## 2021-10-08 DIAGNOSIS — N39.3 STRESS INCONTINENCE IN FEMALE: ICD-10-CM

## 2021-10-08 DIAGNOSIS — N39.41 URGE INCONTINENCE OF URINE: ICD-10-CM

## 2021-10-08 DIAGNOSIS — N20.0 NEPHROLITHIASIS: ICD-10-CM

## 2021-10-08 DIAGNOSIS — N39.490 URINARY INCONTINENCE, OVERFLOW: Primary | ICD-10-CM

## 2021-10-08 DIAGNOSIS — R35.0 URINARY FREQUENCY: ICD-10-CM

## 2021-10-08 DIAGNOSIS — N32.81 OAB (OVERACTIVE BLADDER): ICD-10-CM

## 2021-10-08 PROCEDURE — 52000 CYSTOURETHROSCOPY: CPT | Performed by: UROLOGY

## 2021-10-08 RX ORDER — SOLIFENACIN SUCCINATE 10 MG/1
10 TABLET, FILM COATED ORAL 2 TIMES DAILY
Qty: 30 TABLET | Refills: 5 | Status: SHIPPED | OUTPATIENT
Start: 2021-10-08 | End: 2021-10-14 | Stop reason: SDUPTHER

## 2021-10-08 NOTE — PATIENT INSTRUCTIONS
Patient Education        Stopping Smoking: Care Instructions  Your Care Instructions     Cigarette smokers crave the nicotine in cigarettes. Giving it up is much harder than simply changing a habit. Your body has to stop craving the nicotine. It is hard to quit, but you can do it. There are many tools that people use to quit smoking. You may find that combining tools works best for you. There are several steps to quitting. First you get ready to quit. Then you get support to help you. After that, you learn new skills and behaviors to become a nonsmoker. For many people, a necessary step is getting and using medicine. Your doctor will help you set up the plan that best meets your needs. You may want to attend a smoking cessation program to help you quit smoking. When you choose a program, look for one that has proven success. Ask your doctor for ideas. You will greatly increase your chances of success if you take medicine as well as get counseling or join a cessation program.  Some of the changes you feel when you first quit tobacco are uncomfortable. Your body will miss the nicotine at first, and you may feel short-tempered and grumpy. You may have trouble sleeping or concentrating. Medicine can help you deal with these symptoms. You may struggle with changing your smoking habits and rituals. The last step is the tricky one: Be prepared for the smoking urge to continue for a time. This is a lot to deal with, but keep at it. You will feel better. Follow-up care is a key part of your treatment and safety. Be sure to make and go to all appointments, and call your doctor if you are having problems. It's also a good idea to know your test results and keep a list of the medicines you take. How can you care for yourself at home? · Ask your family, friends, and coworkers for support. You have a better chance of quitting if you have help and support.   · Join a support group, such as Nicotine Anonymous, for people who are trying to quit smoking. · Consider signing up for a smoking cessation program, such as the American Lung Association's Freedom from Smoking program.  · Get text messaging support. Go to the website at www.smokefree. gov to sign up for the [facility] program.  · Set a quit date. Pick your date carefully so that it is not right in the middle of a big deadline or stressful time. Once you quit, do not even take a puff. Get rid of all ashtrays and lighters after your last cigarette. Clean your house and your clothes so that they do not smell of smoke. · Learn how to be a nonsmoker. Think about ways you can avoid those things that make you reach for a cigarette. ? Avoid situations that put you at greatest risk for smoking. For some people, it is hard to have a drink with friends without smoking. For others, they might skip a coffee break with coworkers who smoke. ? Change your daily routine. Take a different route to work or eat a meal in a different place. · Cut down on stress. Calm yourself or release tension by doing an activity you enjoy, such as reading a book, taking a hot bath, or gardening. · Talk to your doctor or pharmacist about nicotine replacement therapy, which replaces the nicotine in your body. You still get nicotine but you do not use tobacco. Nicotine replacement products help you slowly reduce the amount of nicotine you need. These products come in several forms, many of them available over-the-counter:  ? Nicotine patches  ? Nicotine gum and lozenges  ? Nicotine inhaler  · Ask your doctor about bupropion (Wellbutrin) or varenicline (Chantix), which are prescription medicines. They do not contain nicotine. They help you by reducing withdrawal symptoms, such as stress and anxiety. · Some people find hypnosis, acupuncture, and massage helpful for ending the smoking habit. · Eat a healthy diet and get regular exercise.  Having healthy habits will help your body move past its craving for nicotine. · Be prepared to keep trying. Most people are not successful the first few times they try to quit. Do not get mad at yourself if you smoke again. Make a list of things you learned and think about when you want to try again, such as next week, next month, or next year. Where can you learn more? Go to https://[employer]. org and sign in to your [employer] account. Enter J307 in the [employer] box to learn more about \"Stopping Smoking: Care Instructions. \"     If you do not have an account, please click on the \"Sign Up Now\" link. Current as of: February 11, 2021               Content Version: 13.0  © 2006-2021 Healthwise[employer] Inc[employer]. Care instructions adapted under license by [facility] If you have questions about a medical condition or this instruction, always ask your healthcare professional. [address] any warranty or liability for your use of this information.

## 2021-10-08 NOTE — PROGRESS NOTES
Liliam Mcadams MD  Urology Clinic office visit    Patient:  Carl Sherwood  YOB: 1955  Date: 10/8/2021    HISTORY OF PRESENT ILLNESS:   The patient is a 61 y.o. female who presents today for follow-up for the following problem(s):      1. Urinary incontinence, overflow    2. OAB (overactive bladder)    3. Stress incontinence in female    4. Nephrolithiasis    5. Urge incontinence of urine    6. Urinary frequency         Overall the problem(s) : are worsening. Associated Symptoms: No dysuria, gross hematuria. Pain Severity:      Summary of old records: 28-year-old white female returns today after undergoing permanent sacral nerve stimulator placement on 11/23/2020. Had a fall post operatively  Additional History:   Does not think device is working  She reports urgency, urge incontinence  3 pads a day  Also JEFF, mixed    Medtronic interrogation  Patient says device has been mostly off. Voiding about 8 times a day with leaks about 1-2 times a day. Most all programming options stimulated the right toe for the patient. If amp is strong enough, stimulation of vaginal area is achieved, but then right toe is too stimulated. Left program on [email or web address] with a lower pulse width    Imaging Reviewed during this Office Visit:   (results were independently reviewed by physician and radiology report verified)  I independently reviewed and verified the images and reports from:    XR ANKLE RIGHT (MIN 3 VIEWS)    Result Date: 5/6/2021  PROCEDURE: XR FOOT RIGHT (MIN 3 VIEWS), XR ANKLE RIGHT (MIN 3 VIEWS) CLINICAL INFORMATION: Trauma by fall. Twisted foot and ankle. COMPARISON: No prior study. TECHNIQUE: 4 views of the right foot and 3 standard views of the right ankle were obtained. Were obtained. FINDINGS: RIGHT FOOT: No fracture or dislocation is seen. There is a small plantar calcaneal spur. RIGHT ANKLE: No fracture or dislocation is seen. The ankle mortise is intact.  There is mild soft tissue swelling anteriorly. Mild soft tissue swelling. Otherwise normal foot and ankle. No fracture is seen. **This report has been created using voice recognition software. It may contain minor errors which are inherent in voice recognition technology. ** Final report electronically signed by Dr. Javon Tejada on 5/6/2021 1:20 PM    XR FOOT RIGHT (MIN 3 VIEWS)    Result Date: 5/6/2021  PROCEDURE: XR FOOT RIGHT (MIN 3 VIEWS), XR ANKLE RIGHT (MIN 3 VIEWS) CLINICAL INFORMATION: Trauma by fall. Twisted foot and ankle. COMPARISON: No prior study. TECHNIQUE: 4 views of the right foot and 3 standard views of the right ankle were obtained. Were obtained. FINDINGS: RIGHT FOOT: No fracture or dislocation is seen. There is a small plantar calcaneal spur. RIGHT ANKLE: No fracture or dislocation is seen. The ankle mortise is intact. There is mild soft tissue swelling anteriorly. Mild soft tissue swelling. Otherwise normal foot and ankle. No fracture is seen. **This report has been created using voice recognition software. It may contain minor errors which are inherent in voice recognition technology. ** Final report electronically signed by Dr. Javon Tejada on 5/6/2021 1:20 PM      Urinalysis today:  No results found for this visit on 10/08/21.     Last BUN and creatinine:  Lab Results   Component Value Date    BUN 8 02/22/2021     Lab Results   Component Value Date    CREATININE 0.7 02/22/2021       PAST MEDICAL, FAMILY AND SOCIAL HISTORY UPDATE:  Past Medical History:   Diagnosis Date    Acute renal failure with acute cortical necrosis (HCC) 12/21/2019    Allergic rhinitis     Arthritis     spine    Bipolar 1 disorder ([address]     Bipolar disorder ([address]     Blood circulation, collateral     Cancer ([address] 2011    cancerous polyps removed - Dr. Brittany Fontanez Colon polyps     COPD (chronic obstructive pulmonary disease) ([address] 7/24/2014    Coronary vasospasm ([address] 8/17/2019    Depression     Diverticulitis of colon     GERD (gastroesophageal reflux disease)     Hiatal hernia     History of arterial ischemic stroke 7/20/2019    History of colonoscopy 2002    History of kidney stones     Hx of blood clots 6/17/2014    PE and collar bone area after shoulder surgery    Hyperlipidemia     Hypertension     Liver disease     enlarged liver - damaged with alcohol in past but no cirrhosis per patient    Pneumonia 7/24/2014    Suicidal thoughts     [facility]    Thyroid disease     Type 2 diabetes mellitus without complication, without long-term current use of insulin ([address] 7/20/2019    Vomiting     Wears dentures     Wears glasses      Past Surgical History:   Procedure Laterality Date    ABDOMEN SURGERY      x4 removed cysts and ovary removed    CARDIAC SURGERY      heart caths, no stents    CARPAL TUNNEL RELEASE Bilateral 2016    CHOLECYSTECTOMY, LAPAROSCOPIC  6/12/15    Dr. [facility]    COLONOSCOPY  2011   [facility] N/A 7/13/2020    CYSTOSCOPY WITH BLADDER BIOPSIES performed by Anamika Hart MD at [facility], [address] Right 10/7/2004    Dr. Kelly Emery Bilateral 2016    decompression   [facility]    Dr. Luz Maria PABONH with [facility]  2004, 2014    right shoulder    SHOULDER SURGERY  2014    right    SKIN BIOPSY  2006    under left eye    STIMULATOR SURGERY N/A 11/4/2020    STAGE 1 INTERSTIM PLACEMENT performed by Anamika Hart MD at [place] [address] 11/23/2020    PLACEMENT OF STAGE II INTERSTIM performed by Anamika Hart MD at [address] [place] [address] History   Problem Relation Age of Onset    Cancer Mother     Heart Disease Mother     High Blood Pressure Mother     High Cholesterol Mother     Cancer Father         brain    Depression Father     Heart Disease Father     High Cholesterol Father     Mental Illness Father     Cancer Sister  Heart Attack Sister     Heart Disease Maternal Uncle     High Cholesterol Maternal Uncle     Diabetes Maternal Grandmother     Heart Disease Maternal Grandmother     High Cholesterol Maternal Grandmother     Early Death Maternal Grandfather     Heart Disease Maternal Grandfather     High Cholesterol Maternal Grandfather     Stroke Maternal Grandfather     Heart Disease Paternal Grandmother     High Cholesterol Paternal Grandmother     Heart Disease Paternal Grandfather     High Cholesterol Paternal Grandfather      Outpatient Medications Marked as Taking for the 10/8/21 encounter (Procedure visit) with Brynn Greenberg MD   Medication Sig Dispense Refill    solifenacin (VESICARE) 10 MG tablet Take 1 tablet by mouth 2 times daily 30 tablet 5    carvedilol (COREG) 3.125 MG tablet TAKE 1 TABLET BY MOUTH TWICE DAILY      famotidine (PEPCID) 20 MG tablet Take 20 mg by mouth daily      aspirin 81 MG EC tablet Take 1 tablet by mouth daily 30 tablet 3    nitroGLYCERIN (NITROSTAT) 0.4 MG SL tablet up to max of 3 total doses.  If no relief after 1 dose, call 911. 25 tablet 3    hydrOXYzine (VISTARIL) 50 MG capsule Take 1 capsule by mouth 3 times daily as needed for Itching or Anxiety 90 capsule 0    topiramate (TOPAMAX) 50 MG tablet Take 1 tablet by mouth 2 times daily 60 tablet 3    sucralfate (CARAFATE) 1 GM tablet Take 1 tablet by mouth 4 times daily 120 tablet 3    QUEtiapine (SEROQUEL) 200 MG tablet Take 200 mg by mouth daily PM      clopidogrel (PLAVIX) 75 MG tablet Take 75 mg by mouth daily      albuterol (PROVENTIL) (2.5 MG/3ML) 0.083% nebulizer solution Take 3 mLs by nebulization every 6 hours as needed for Wheezing 120 each 0    Roflumilast (DALIRESP) 500 MCG tablet Take 500 mcg by mouth daily      Tiotropium Bromide Monohydrate (SPIRIVA HANDIHALER IN) Inhale 2 puffs into the lungs daily      acetaminophen (MAPAP) 325 MG tablet Take 650 mg by mouth every 6 hours as needed for Pain      potassium chloride (KLOR-CON M) 20 MEQ extended release tablet Take 40 mEq by mouth daily       levothyroxine (SYNTHROID) 75 MCG tablet Take 1 tablet by mouth daily everyday except Sunday take 150 mcg. 30 tablet 0    fluticasone (FLONASE) 50 MCG/ACT nasal spray 1 spray by Each Nostril route 2 times daily 1 Bottle 0    ferrous sulfate 325 (65 Fe) MG tablet Take 1 tablet by mouth 2 times daily 30 tablet 0    folic acid (FOLVITE) 1 MG tablet Take 1 mg by mouth daily      ARIPiprazole (ABILIFY) 2 MG tablet Take 2 mg by mouth daily      atorvastatin (LIPITOR) 40 MG tablet Take 40 mg by mouth nightly       escitalopram (LEXAPRO) 20 MG tablet Take 30 mg by mouth daily       fenofibrate 160 MG tablet Take 145 mg by mouth daily       traZODone (DESYREL) 100 MG tablet Take 2 tablets by mouth nightly (Patient taking differently: Take 150 mg by mouth nightly 2 tabs prn) 30 tablet 0    QUEtiapine (SEROQUEL XR) 300 MG extended release tablet Take 2 tablets by mouth nightly (Patient taking differently: Take 400 mg by mouth nightly ) 30 tablet 0       Seasonal  Social History     Tobacco Use   Smoking Status Current Every Day Smoker    Packs/day: 1.00    Years: 30.00    Pack years: 30.00    Types: Cigarettes    Start date: 4/22/1977   Smokeless Tobacco Never Used       Social History     Substance and Sexual Activity   Alcohol Use No    Comment: none for 2 years       REVIEW OF SYSTEMS:  Constitutional: negative  Eyes: negative  Respiratory: negative  Cardiovascular: negative  Gastrointestinal: negative  Genitourinary: negative except for what is in HPI  Musculoskeletal: negative  Skin: negative   Neurological: negative  Hematological/Lymphatic: negative  Psychological: negative    Physical Exam:      Vitals:    10/08/21 1100   Resp: 14     Patient is a 61 y.o. female in no acute distress and alert and oriented to person, place and time.     NAD, Alert  Non labored respiration      Cystoscopy Operative Note  Surgeon: Pablo Hogan M.D, MD     Findings:   The patient was prepped and draped in the usual sterile fashion. The flexible cystoscope was advanced through the urethra and into the bladder. The bladder was thoroughly inspected and the following was noted:    Vagina: normal appearing vagina with normal color and discharge, no lesions  No much prolapse  Rectal vault with stool  Rectocele noted  Urethra: normal appearing urethra with no masses, tenderness or lesions  No JEFF noted with cough  Bladder: No tumors or CIS noted. No bladder diverticulum. moderate trabeculation noted. Ureters:  Orifices with normal configuration and location. The cystoscope was removed. The patient tolerated the procedure well. Assessment and Plan      1. Urinary incontinence, overflow    2. OAB (overactive bladder)    3. Stress incontinence in female    4. Nephrolithiasis    5. Urge incontinence of urine    6. Urinary frequency           Plan:       Medtr[employer] interrogation notes noted    Vesicare Trial improved some, but not sure.  Will increase to BID  Rx for laxatives    Follow up 6-8 weeks to reassess symptoms  May need SNS removal and replacement/revision of lead           Prescriptions Ordered:  Orders Placed This Encounter   Medications    solifenacin (VESICARE) 10 MG tablet     Sig: Take 1 tablet by mouth 2 times daily     Dispense:  30 tablet     Refill:  5      Orders Placed:  Orders Placed This Encounter   Procedures   MD Mónica Golden M.D, MD [facility]

## 2021-10-14 NOTE — TELEPHONE ENCOUNTER
Prior Auth initiated for Solifenacin . PA sent to [facility]. Should receive response in 3-5 days.

## 2021-10-18 RX ORDER — SOLIFENACIN SUCCINATE 10 MG/1
10 TABLET, FILM COATED ORAL DAILY
Qty: 30 TABLET | Refills: 5 | Status: SHIPPED | OUTPATIENT
Start: 2021-10-18 | End: 2021-11-11

## 2021-11-03 ENCOUNTER — APPOINTMENT (OUTPATIENT)
Dept: GENERAL RADIOLOGY | Age: 66
End: 2021-11-03
Payer: MEDICAID

## 2021-11-03 ENCOUNTER — HOSPITAL ENCOUNTER (EMERGENCY)
Age: 66
Discharge: HOME | End: 2021-11-03
Attending: EMERGENCY MEDICINE
Payer: MEDICAID

## 2021-11-03 VITALS
HEIGHT: 64 IN | SYSTOLIC BLOOD PRESSURE: 134 MMHG | WEIGHT: 140 LBS | TEMPERATURE: 97.6 F | DIASTOLIC BLOOD PRESSURE: 70 MMHG | HEART RATE: 71 BPM | BODY MASS INDEX: 23.9 KG/M2 | RESPIRATION RATE: 24 BRPM | OXYGEN SATURATION: 96 %

## 2021-11-03 DIAGNOSIS — J40 BRONCHITIS: Primary | ICD-10-CM

## 2021-11-03 LAB
ANION GAP SERPL CALCULATED.3IONS-SCNC: 12 MEQ/L (ref 8–16)
BASOPHILS # BLD: 0.5 %
BASOPHILS ABSOLUTE: 0 THOU/MM3 (ref 0–0.1)
BUN BLDV-MCNC: 10 MG/DL (ref 7–22)
CALCIUM SERPL-MCNC: 10.4 MG/DL (ref 8.5–10.5)
CHLORIDE BLD-SCNC: 102 MEQ/L (ref 98–111)
CO2: 24 MEQ/L (ref 23–33)
CREAT SERPL-MCNC: 0.8 MG/DL (ref 0.4–1.2)
EKG ATRIAL RATE: 70 BPM
EKG P AXIS: 63 DEGREES
EKG P-R INTERVAL: 152 MS
EKG Q-T INTERVAL: 400 MS
EKG QRS DURATION: 84 MS
EKG QTC CALCULATION (BAZETT): 432 MS
EKG R AXIS: 18 DEGREES
EKG T AXIS: 85 DEGREES
EKG VENTRICULAR RATE: 70 BPM
EOSINOPHIL # BLD: 1.9 %
EOSINOPHILS ABSOLUTE: 0.1 THOU/MM3 (ref 0–0.4)
ERYTHROCYTE [DISTWIDTH] IN BLOOD BY AUTOMATED COUNT: 13.8 % (ref 11.5–14.5)
ERYTHROCYTE [DISTWIDTH] IN BLOOD BY AUTOMATED COUNT: 44.4 FL (ref 35–45)
GFR SERPL CREATININE-BSD FRML MDRD: 72 ML/MIN/1.73M2
GLUCOSE BLD-MCNC: 104 MG/DL (ref 70–108)
HCT VFR BLD CALC: 42.1 % (ref 37–47)
HEMOGLOBIN: 13.7 GM/DL (ref 12–16)
IMMATURE GRANS (ABS): 0.02 THOU/MM3 (ref 0–0.07)
IMMATURE GRANULOCYTES: 0.3 %
LYMPHOCYTES # BLD: 42.3 %
LYMPHOCYTES ABSOLUTE: 3.1 THOU/MM3 (ref 1–4.8)
MCH RBC QN AUTO: 28.6 PG (ref 26–33)
MCHC RBC AUTO-ENTMCNC: 32.5 GM/DL (ref 32.2–35.5)
MCV RBC AUTO: 87.9 FL (ref 81–99)
MONOCYTES # BLD: 6.9 %
MONOCYTES ABSOLUTE: 0.5 THOU/MM3 (ref 0.4–1.3)
NUCLEATED RED BLOOD CELLS: 0 /100 WBC
OSMOLALITY CALCULATION: 275 MOSMOL/KG (ref 275–300)
PLATELET # BLD: 285 THOU/MM3 (ref 130–400)
PMV BLD AUTO: 10.1 FL (ref 9.4–12.4)
POTASSIUM REFLEX MAGNESIUM: 3.6 MEQ/L (ref 3.5–5.2)
PRO-BNP: 526.4 PG/ML (ref 0–900)
RBC # BLD: 4.79 MILL/MM3 (ref 4.2–5.4)
SARS-COV-2, NAAT: NOT  DETECTED
SEG NEUTROPHILS: 48.1 %
SEGMENTED NEUTROPHILS ABSOLUTE COUNT: 3.6 THOU/MM3 (ref 1.8–7.7)
SODIUM BLD-SCNC: 138 MEQ/L (ref 135–145)
TROPONIN T: < 0.01 NG/ML
WBC # BLD: 7.4 THOU/MM3 (ref 4.8–10.8)

## 2021-11-03 PROCEDURE — 94640 AIRWAY INHALATION TREATMENT: CPT

## 2021-11-03 PROCEDURE — 94760 N-INVAS EAR/PLS OXIMETRY 1: CPT

## 2021-11-03 PROCEDURE — 83880 ASSAY OF NATRIURETIC PEPTIDE: CPT

## 2021-11-03 PROCEDURE — 87635 SARS-COV-2 COVID-19 AMP PRB: CPT

## 2021-11-03 PROCEDURE — 93005 ELECTROCARDIOGRAM TRACING: CPT | Performed by: EMERGENCY MEDICINE

## 2021-11-03 PROCEDURE — 85025 COMPLETE CBC W/AUTO DIFF WBC: CPT

## 2021-11-03 PROCEDURE — 36415 COLL VENOUS BLD VENIPUNCTURE: CPT

## 2021-11-03 PROCEDURE — 80048 BASIC METABOLIC PNL TOTAL CA: CPT

## 2021-11-03 PROCEDURE — 84484 ASSAY OF TROPONIN QUANT: CPT

## 2021-11-03 PROCEDURE — 71046 X-RAY EXAM CHEST 2 VIEWS: CPT

## 2021-11-03 PROCEDURE — 99284 EMERGENCY DEPT VISIT MOD MDM: CPT

## 2021-11-03 PROCEDURE — 6370000000 HC RX 637 (ALT 250 FOR IP): Performed by: EMERGENCY MEDICINE

## 2021-11-03 RX ORDER — IPRATROPIUM BROMIDE AND ALBUTEROL SULFATE 2.5; .5 MG/3ML; MG/3ML
1 SOLUTION RESPIRATORY (INHALATION) ONCE
Status: COMPLETED | OUTPATIENT
Start: 2021-11-03 | End: 2021-11-03

## 2021-11-03 RX ORDER — PREDNISONE 20 MG/1
20 TABLET ORAL 2 TIMES DAILY
Qty: 10 TABLET | Refills: 0 | Status: SHIPPED | OUTPATIENT
Start: 2021-11-03 | End: 2021-11-08

## 2021-11-03 RX ADMIN — IPRATROPIUM BROMIDE AND ALBUTEROL SULFATE 1 AMPULE: .5; 3 SOLUTION RESPIRATORY (INHALATION) at 07:59

## 2021-11-03 ASSESSMENT — PAIN DESCRIPTION - DESCRIPTORS: DESCRIPTORS: SHARP

## 2021-11-03 ASSESSMENT — PAIN SCALES - GENERAL: PAINLEVEL_OUTOF10: 6

## 2021-11-03 ASSESSMENT — PAIN DESCRIPTION - FREQUENCY: FREQUENCY: CONTINUOUS

## 2021-11-03 ASSESSMENT — PAIN DESCRIPTION - PROGRESSION: CLINICAL_PROGRESSION: GRADUALLY WORSENING

## 2021-11-03 ASSESSMENT — PAIN DESCRIPTION - LOCATION: LOCATION: CHEST

## 2021-11-03 ASSESSMENT — PAIN DESCRIPTION - ONSET: ONSET: GRADUAL

## 2021-11-03 NOTE — ED PROVIDER NOTES
[facility] EMERGENCY DEPT      CHIEF COMPLAINT       Chief Complaint   Patient presents with    Cough    Shortness of Breath       Nurses Notes reviewed and I agree except as noted in the HPI. HISTORY OF PRESENT ILLNESS    Siddhartha Hernandez is a 61 y.o. female who presents patient with complaint of cough, shortness of breath. Patient has had a cough for the past 2 to 3 days. No fevers or chills. History of emphysema, smokes 1 pack/day. Onset: Acute  Duration: 2 to 3 days  Timing: Persistent  Location of Pain: Chest pain with coughing  Intesity/severity: Mild  Modifying Factors: Coughing makes the pain worse  Relieved by;  Previous Episodes; Tx Before arrival: None, patient has bronchodilators but did not use as directed  REVIEW OF SYSTEMS      Review of Systems   Constitutional: Negative for fever, chills, diaphoresis and fatigue. HENT: Negative for congestion, drooling, facial swelling and sore throat. Eyes: Negative for photophobia, pain and discharge. Respiratory: Positive for cough, shortness of breath, wheezing. Positive for chest wall pain with coughing. Cardiovascular: Negative for chest pain, palpitations and leg swelling. Gastrointestinal: Negative for abdominal pain, blood in stool and abdominal distention. Endocrine: Negative for cold intolerance, heat intolerance, polydipsia and polyuria. Genitourinary: Negative for dysuria, urgency, hematuria and difficulty urinating. Musculoskeletal: Negative for gait problem, neck pain and neck stiffness. Skin; No rash, No itching  Neurological: Negative for seizures, weakness and numbness. Hematological: Negative for adenopathy. Does not bruise/bleed easily.      PAST MEDICAL HISTORY    has a past medical history of Acute renal failure with acute cortical necrosis ([address] Allergic rhinitis, Arthritis, Bipolar 1 disorder ([address] Bipolar disorder ([address] Blood circulation, collateral, Cancer ([facility] Colon polyps, COPD (chronic obstructive pulmonary disease) ([facility] Coronary vasospasm (HCC), Depression, Diverticulitis of colon, GERD (gastroesophageal reflux disease), Hiatal hernia, History of arterial ischemic stroke, History of colonoscopy, History of kidney stones, Hx of blood clots, Hyperlipidemia, Hypertension, Liver disease, Pneumonia, Suicidal thoughts, Thyroid disease, Type 2 diabetes mellitus without complication, without long-term current use of insulin ([facility] Vomiting, Wears dentures, and Wears glasses. SURGICAL HISTORY      has a past surgical history that includes Mandible fracture surgery (1984); shoulder surgery (2014); Colonoscopy (2011); Dilatation, esophagus; Elbow surgery (Right, 10/7/2004); Rotator cuff repair (2004, 2014); skin biopsy (2006); Cholecystectomy, laparoscopic (6/12/15); Elbow surgery (Bilateral, 2016); Carpal tunnel release (Bilateral, 2016); Hysterectomy (1998); Cardiac surgery; Abdomen surgery; cystoscopy w biopsy of bladder (N/A, 7/13/2020); Stimulator Surgery (N/A, 11/4/2020); and Stimulator Surgery (N/A, 11/23/2020). CURRENT MEDICATIONS       Discharge Medication List as of 11/3/2021  9:28 AM      CONTINUE these medications which have NOT CHANGED    Details   solifenacin (VESICARE) 10 MG tablet Take 1 tablet by mouth daily, Disp-30 tablet, R-5Normal      carvedilol (COREG) 3.125 MG tablet TAKE 1 TABLET BY MOUTH TWICE DAILYHistorical Med      famotidine (PEPCID) 20 MG tablet Take 20 mg by mouth dailyHistorical Med      ibuprofen (IBU) 600 MG tablet Take 1 tablet by mouth every 6 hours as needed for Pain, Disp-40 tablet, R-0Normal      aspirin 81 MG EC tablet Take 1 tablet by mouth daily, Disp-30 tablet, R-3Normal      nitroGLYCERIN (NITROSTAT) 0.4 MG SL tablet up to max of 3 total doses.  If no relief after 1 dose, call 911., Disp-25 tablet, R-3Normal      hydrOXYzine (VISTARIL) 50 MG capsule Take 1 capsule by mouth 3 times daily as needed for Itching or Anxiety, Disp-90 capsule, R-0Normal      topiramate (TOPAMAX) 50 MG tablet Take 1 tablet by mouth 2 times daily, Disp-60 tablet, R-3Normal      sucralfate (CARAFATE) 1 GM tablet Take 1 tablet by mouth 4 times daily, Disp-120 tablet, R-3Normal      QUEtiapine (SEROQUEL) 200 MG tablet Take 200 mg by mouth daily PMHistorical Med      clopidogrel (PLAVIX) 75 MG tablet Take 75 mg by mouth dailyHistorical Med      Respiratory Therapy Supplies (NEBULIZER/TUBING/MOUTHPIECE) KIT EVERY 6 HOURS PRN Starting Wed 8/26/2020, Until Thu 8/26/2021, For 365 days, Disp-1 kit,R-1, Print      albuterol (PROVENTIL) (2.5 MG/3ML) 0.083% nebulizer solution Take 3 mLs by nebulization every 6 hours as needed for Wheezing, Disp-120 each,R-0Normal      Roflumilast (DALIRESP) 500 MCG tablet Take 500 mcg by mouth dailyHistorical Med      Tiotropium Bromide Monohydrate (SPIRIVA HANDIHALER IN) Inhale 2 puffs into the lungs dailyHistorical Med      acetaminophen (MAPAP) 325 MG tablet Take 650 mg by mouth every 6 hours as needed for PainHistorical Med      potassium chloride (KLOR-CON M) 20 MEQ extended release tablet Take 40 mEq by mouth daily Historical Med      levothyroxine (SYNTHROID) 75 MCG tablet Take 1 tablet by mouth daily everyday except Sunday take 150 mcg., Disp-30 tablet, R-0NO PRINT      fluticasone (FLONASE) 50 MCG/ACT nasal spray 1 spray by Each Nostril route 2 times daily, Disp-1 Bottle, R-0NO PRINT      ferrous sulfate 325 (65 Fe) MG tablet Take 1 tablet by mouth 2 times daily, Disp-30 tablet, M-2SP PRINT      folic acid (FOLVITE) 1 MG tablet Take 1 mg by mouth dailyHistorical Med      ARIPiprazole (ABILIFY) 2 MG tablet Take 2 mg by mouth dailyHistorical Med      atorvastatin (LIPITOR) 40 MG tablet Take 40 mg by mouth nightly Historical Med      escitalopram (LEXAPRO) 20 MG tablet Take 30 mg by mouth daily Historical Med      fenofibrate 160 MG tablet Take 145 mg by mouth daily Historical Med      traZODone (DESYREL) 100 MG tablet Take 2 tablets by mouth nightly, Disp-30 tablet, R-0Normal      QUEtiapine (SEROQUEL XR) 300 MG extended release tablet Take 2 tablets by mouth nightly, Disp-30 tablet, R-0Normal             ALLERGIES     is allergic to seasonal.    FAMILY HISTORY     She indicated that her mother is . She indicated that her father is . She indicated that her sister is alive. She indicated that her brother is alive. She indicated that the status of her maternal grandmother is unknown. She indicated that the status of her maternal grandfather is unknown. She indicated that the status of her paternal grandmother is unknown. She indicated that the status of her paternal grandfather is unknown. She indicated that the status of her maternal uncle is unknown.   family history includes Cancer in her father, mother, and sister; Depression in her father; Diabetes in her maternal grandmother; Early Death in her maternal grandfather; Heart Attack in her sister; Heart Disease in her father, maternal grandfather, maternal grandmother, maternal uncle, mother, paternal grandfather, and paternal grandmother; High Blood Pressure in her mother; High Cholesterol in her father, maternal grandfather, maternal grandmother, maternal uncle, mother, paternal grandfather, and paternal grandmother; Mental Illness in her father; Stroke in her maternal grandfather. SOCIAL HISTORY      reports that she has been smoking cigarettes. She started smoking about 44 years ago. She has a 30.00 pack-year smoking history. She has never used smokeless tobacco. She reports current drug use. Frequency: 1.00 time per week. Drug: Cocaine. She reports that she does not drink alcohol. PHYSICAL EXAM     INITIAL VITALS:  height is 5' 4\" (1.626 m) and weight is 140 lb (63.5 kg). Her oral temperature is 97.6 °F (36.4 °C). Her blood pressure is 134/70 and her pulse is 71. Her respiration is 24 and oxygen saturation is 96%. Physical Exam   Constitutional:  well-developed and well-nourished.    HENT: Head: Normocephalic, atraumatic, Bilateral external ears normal, Oropharynx mosit, No oral exudates, Nose normal.   Eyes: PERRL, EOMI, Conjunctiva normal, No discharge. No scleral icterus  Neck: Normal range of motion, No tenderness, Supple  Cardiovascular: Normal rate, regular rhythm, S1 normal and S2 normal.  Exam reveals no gallop. Pulmonary/Chest: Effort normal and breath sounds normal. No accessory muscle usage or stridor. No respiratory distress. no wheezes. has no rales. exhibits no tenderness. Abdominal: Soft. Bowel sounds are normal.  exhibits no distension. There is no tenderness. There is no rebound and no guarding. Extremities: No edema, no tenderness, no cyanosis, no clubbing. Musculoskeletal: Good range of motion in major joints is observed. No major deformities noted. Neurological: Alert and oriented ×3, normal motor function, normal sensory function, no focal deficits. GCS 15  Skin: Skin is warm, dry and intact. No rash noted. No erythema. DIFFERENTIAL DIAGNOSIS:       DIAGNOSTIC RESULTS     EKG: All EKG's are interpreted by the Emergency Department Physician who either signs or Co-signs this chart in the absence of a cardiologist.      RADIOLOGY: non-plain film images(s) such as CT, Ultrasound and MRI are read by the radiologist.  Plain radiographic images are visualized and preliminarily interpreted by the emergency physician unless otherwise stated below.       LABS:   Labs Reviewed   GLOMERULAR FILTRATION RATE, ESTIMATED - Abnormal; Notable for the following components:       Result Value    Est, Glom Filt Rate 72 (*)     All other components within normal limits   COVID-19, RAPID   CBC WITH AUTO DIFFERENTIAL   BASIC METABOLIC PANEL W/ REFLEX TO MG FOR LOW K   BRAIN NATRIURETIC PEPTIDE   TROPONIN   ANION GAP   OSMOLALITY       EMERGENCY DEPARTMENT COURSE:   Vitals:    Vitals:    11/03/21 0737 11/03/21 0739 11/03/21 0759 11/03/21 0901   BP:  133/78  134/70   Pulse:  72  71   Resp:  16 16 24   Temp: 97.9 °F (36.6 °C) 97.6 °F (36.4 °C)     TempSrc: Oral Oral     SpO2:  97% 96%    Weight:       Height:         Patient presenting with complaint of cough, congestion, mild wheezing on auscultation, otherwise no work of breathing/fever. Chest x-ray stable, no acute findings. Covid negative. Patient given DuoNeb treatment, said that she feels better. Will discharge patient home with instructions to use her bronchodilators as directed by her primary care physician. CRITICAL CARE:       CONSULTS:  None    PROCEDURES:  None    FINAL IMPRESSION      1. Bronchitis          DISPOSITION/PLAN   Decision To Discharge    PATIENT REFERRED TO:  No follow-up provider specified.     DISCHARGE MEDICATIONS:  Discharge Medication List as of 11/3/2021  9:28 AM      START taking these medications    Details   predniSONE (DELTASONE) 20 MG tablet Take 1 tablet by mouth 2 times daily for 5 days, Disp-10 tablet, R-0Normal             (Please note that portions of this note were completed with a voice recognition program.  Efforts were made to edit the dictations but occasionally words are mis-transcribed.)    Courtney Ireland, [address] [place] [address],   11/04/21 1291

## 2021-11-04 ENCOUNTER — TELEPHONE (OUTPATIENT)
Dept: UROLOGY | Age: 66
End: 2021-11-04

## 2021-11-04 ENCOUNTER — OFFICE VISIT (OUTPATIENT)
Dept: UROLOGY | Age: 66
End: 2021-11-04
Payer: MEDICAID

## 2021-11-04 VITALS — BODY MASS INDEX: 25.95 KG/M2 | WEIGHT: 152 LBS | HEIGHT: 64 IN | TEMPERATURE: 97.1 F

## 2021-11-04 DIAGNOSIS — N20.0 NEPHROLITHIASIS: ICD-10-CM

## 2021-11-04 DIAGNOSIS — N39.3 STRESS INCONTINENCE IN FEMALE: ICD-10-CM

## 2021-11-04 DIAGNOSIS — N39.41 URGE INCONTINENCE OF URINE: ICD-10-CM

## 2021-11-04 DIAGNOSIS — R35.0 URINARY FREQUENCY: ICD-10-CM

## 2021-11-04 DIAGNOSIS — N32.81 OAB (OVERACTIVE BLADDER): ICD-10-CM

## 2021-11-04 DIAGNOSIS — N39.490 URINARY INCONTINENCE, OVERFLOW: Primary | ICD-10-CM

## 2021-11-04 PROCEDURE — 99214 OFFICE O/P EST MOD 30 MIN: CPT | Performed by: UROLOGY

## 2021-11-04 NOTE — TELEPHONE ENCOUNTER
DO NOT TAKE ASPIRIN, PLAVIX, FISH OIL, COUMADIN, IBUPROFEN, MOTRIN-LIKE DRUGS AND ANY MULTIVITAMINS OR OVER THE COUNTER SUPPLEMENTS 5 DAYS PRIOR TO SURGERY. Morris Dejesus 1955 Diagnosis:    Surgical Physician: Dr. Ravindra Jason have been scheduled for the procedure marked below:      Surgery: Cystoscopy, Bladder Botox 100 uints         Date: 12/9/21     Anesthesia: MAC     Place of Service: [address] [place] [address] [address] Same Day Surgery         Arrive to same day surgery by:  10:00 am  (Surgery time is subject to change)      INSTRUCTIONS AS MARKED BELOW:    1.  DO NOT eat or drink anything after midnight before surgery. 2.  We prefer you shower or bathe with an antibacterial soap (Dial) the morning of surgery. 3.  Please ensure to have a  with you to transport you home. 4.  Please bring a current medication list, photo ID and insurance card(s) with you  5. Okay to take Tylenol  6. If you take Glucophage, Metformin or Janumet, hold 48-hours prior to surgery  7. Take blood pressure or heart medication as directed, if taken in the morning take with a small sip of water  8. The office will call you in 1-2 days after your procedure to schedule a follow up. DATE SENSITIVE TESTING *WALK IN *NO APPOINTMENT    Do the pre op urine culture on 11/24/21. Order given.         Date: 11/4/2021

## 2021-11-04 NOTE — TELEPHONE ENCOUNTER
Patient scheduled for surgery with Dr Linda Jason on 12/9/21. Surgery consent on arrival. Patient to do pre op urine culture on 11/24/21. Dr Pippa Clinton to clear. Surgery instructions mailed to the patient.

## 2021-11-04 NOTE — PROGRESS NOTES
Chloe Martini MD  Urology Clinic office visit    Patient:  Robert Ulloa  YOB: 1955  Date: 11/4/2021    HISTORY OF PRESENT ILLNESS:   The patient is a 61 y.o. female who presents today for follow-up for the following problem(s):      1. Urinary incontinence, overflow    2. OAB (overactive bladder)    3. Stress incontinence in female    4. Nephrolithiasis    5. Urinary frequency    6. Urge incontinence of urine         Overall the problem(s) : are persistent  Associated Symptoms: No dysuria, gross hematuria. Pain Severity:      Summary of old records: 53-year-old white female returns today after undergoing permanent sacral nerve stimulator placement on 11/23/2020. Had a fall post operatively  Additional History:   Does not think device is working  She reports urgency, urge incontinence  3 pads a day  Also JEFF, mixed    [employer] interrogation  Patient says device has been mostly off. Voiding about 8 times a day with leaks about 1-2 times a day. Most all programming options stimulated the right toe for the patient. If amp is strong enough, stimulation of vaginal area is achieved, but then right toe is too stimulated. Left program on [employer] with a lower pulse width        10/8/2021 cysto and PE  Vagina: normal appearing vagina with normal color and discharge, no lesions  No much prolapse  Rectal vault with stool  Rectocele noted  Urethra: normal appearing urethra with no masses, tenderness or lesions  No JEFF noted with cough  Bladder: No tumors or CIS noted. No bladder diverticulum. moderate trabeculation noted. Imaging Reviewed during this Office Visit:   (results were independently reviewed by physician and radiology report verified)    Urinalysis today:  No results found for this visit on 11/04/21.     Last BUN and creatinine:  Lab Results   Component Value Date    BUN 10 11/03/2021     Lab Results   Component Value Date    CREATININE 0.8 11/03/2021       PAST MEDICAL, FAMILY AND SOCIAL HISTORY UPDATE:  Past Medical History:   Diagnosis Date    Acute renal failure with acute cortical necrosis ([address] 12/21/2019    Allergic rhinitis     Arthritis     spine    Bipolar 1 disorder ([address]     Bipolar disorder ([address]     Blood circulation, collateral     Cancer ([address] 2011    cancerous polyps removed - Dr. Courtney Gross Colon polyps     COPD (chronic obstructive pulmonary disease) ([address] 7/24/2014    Coronary vasospasm ([address] 8/17/2019    Depression     Diverticulitis of colon     GERD (gastroesophageal reflux disease)     Hiatal hernia     History of arterial ischemic stroke 7/20/2019    History of colonoscopy 2002    History of kidney stones     Hx of blood clots 6/17/2014    PE and collar bone area after shoulder surgery    Hyperlipidemia     Hypertension     Liver disease     enlarged liver - damaged with alcohol in past but no cirrhosis per patient    Pneumonia 7/24/2014    Suicidal thoughts     2015 admitted to  from [facility]    Thyroid disease     Type 2 diabetes mellitus without complication, without long-term current use of insulin ([address] 7/20/2019    Vomiting     Wears dentures     Wears glasses      Past Surgical History:   Procedure Laterality Date    ABDOMEN SURGERY      x4 removed cysts and ovary removed    CARDIAC SURGERY      heart caths, no stents    CARPAL TUNNEL RELEASE Bilateral 2016    CHOLECYSTECTOMY, LAPAROSCOPIC  6/12/15    Dr. Flower Shall N/A 7/13/2020    CYSTOSCOPY WITH BLADDER BIOPSIES performed by Wayne Trotter MD at [facility], [address] Right 10/7/2004    Dr. Nathan Her Bilateral 2016    decompression   Mukesh Bart    Dr. Jyoti Valdes -[facility] with [address]  2004, 2014    right shoulder    SHOULDER SURGERY  2014    right    SKIN BIOPSY  2006    under left eye    STIMULATOR SURGERY N/A 11/4/2020    STAGE 1 INTERSTIM PLACEMENT performed by Jerel English MD at [address] N/A 11/23/2020    PLACEMENT OF STAGE II INTERSTIM performed by Jerel English MD at [facility] History   Problem Relation Age of Onset    Cancer Mother     Heart Disease Mother     High Blood Pressure Mother     High Cholesterol Mother     Cancer Father         brain    Depression Father     Heart Disease Father     High Cholesterol Father     Mental Illness Father     Cancer Sister     Heart Attack Sister     Heart Disease Maternal Uncle     High Cholesterol Maternal Uncle     Diabetes Maternal Grandmother     Heart Disease Maternal Grandmother     High Cholesterol Maternal Grandmother     Early Death Maternal Grandfather     Heart Disease Maternal Grandfather     High Cholesterol Maternal Grandfather     Stroke Maternal Grandfather     Heart Disease Paternal Grandmother     High Cholesterol Paternal Grandmother     Heart Disease Paternal Grandfather     High Cholesterol Paternal Grandfather      Outpatient Medications Marked as Taking for the 11/4/21 encounter (Office Visit) with Rosa Erazo MD   Medication Sig Dispense Refill    predniSONE (DELTASONE) 20 MG tablet Take 1 tablet by mouth 2 times daily for 5 days 10 tablet 0    carvedilol (COREG) 3.125 MG tablet TAKE 1 TABLET BY MOUTH TWICE DAILY      famotidine (PEPCID) 20 MG tablet Take 20 mg by mouth daily      aspirin 81 MG EC tablet Take 1 tablet by mouth daily 30 tablet 3    nitroGLYCERIN (NITROSTAT) 0.4 MG SL tablet up to max of 3 total doses.  If no relief after 1 dose, call 911. 25 tablet 3    hydrOXYzine (VISTARIL) 50 MG capsule Take 1 capsule by mouth 3 times daily as needed for Itching or Anxiety 90 capsule 0    topiramate (TOPAMAX) 50 MG tablet Take 1 tablet by mouth 2 times daily 60 tablet 3    sucralfate (CARAFATE) 1 GM tablet Take 1 tablet by mouth 4 times daily 120 tablet 3    QUEtiapine (SEROQUEL) 200 MG tablet Take 200 mg by mouth daily PM      clopidogrel (PLAVIX) 75 MG tablet Take 75 mg by mouth daily      albuterol (PROVENTIL) (2.5 MG/3ML) 0.083% nebulizer solution Take 3 mLs by nebulization every 6 hours as needed for Wheezing 120 each 0    Roflumilast (DALIRESP) 500 MCG tablet Take 500 mcg by mouth daily      Tiotropium Bromide Monohydrate (SPIRIVA HANDIHALER IN) Inhale 2 puffs into the lungs daily      acetaminophen (MAPAP) 325 MG tablet Take 650 mg by mouth every 6 hours as needed for Pain      potassium chloride (KLOR-CON M) 20 MEQ extended release tablet Take 40 mEq by mouth daily       levothyroxine (SYNTHROID) 75 MCG tablet Take 1 tablet by mouth daily everyday except Sunday take 150 mcg.  30 tablet 0    fluticasone (FLONASE) 50 MCG/ACT nasal spray 1 spray by Each Nostril route 2 times daily 1 Bottle 0    ferrous sulfate 325 (65 Fe) MG tablet Take 1 tablet by mouth 2 times daily 30 tablet 0    folic acid (FOLVITE) 1 MG tablet Take 1 mg by mouth daily      ARIPiprazole (ABILIFY) 2 MG tablet Take 2 mg by mouth daily      atorvastatin (LIPITOR) 40 MG tablet Take 40 mg by mouth nightly       escitalopram (LEXAPRO) 20 MG tablet Take 30 mg by mouth daily       fenofibrate 160 MG tablet Take 145 mg by mouth daily       traZODone (DESYREL) 100 MG tablet Take 2 tablets by mouth nightly (Patient taking differently: Take 150 mg by mouth nightly 2 tabs prn) 30 tablet 0    QUEtiapine (SEROQUEL XR) 300 MG extended release tablet Take 2 tablets by mouth nightly (Patient taking differently: Take 400 mg by mouth nightly ) 30 tablet 0       Seasonal  Social History     Tobacco Use   Smoking Status Current Every Day Smoker    Packs/day: 1.00    Years: 30.00    Pack years: 30.00    Types: Cigarettes    Start date: 4/22/1977   Smokeless Tobacco Never Used       Social History     Substance and Sexual Activity   Alcohol Use No    Comment: none for 2 years       REVIEW OF SYSTEMS:  Constitutional: negative  Eyes: negative  Respiratory: negative  Cardiovascular: negative  Gastrointestinal: negative  Genitourinary: negative except for what is in HPI  Musculoskeletal: negative  Skin: negative   Neurological: negative  Hematological/Lymphatic: negative  Psychological: negative    Physical Exam:      Vitals:    11/04/21 1132   Temp: 97.1 °F (36.2 °C)     Patient is a 61 y.o. female in no acute distress and alert and oriented to person, place and time. NAD, Alert  Non labored respiration        Assessment and Plan      1. Urinary incontinence, overflow    2. OAB (overactive bladder)    3. Stress incontinence in female    4. Nephrolithiasis    5. Urinary frequency    6. Urge incontinence of urine           Plan:       [employer] interrogation notes noted  OAB still bothersome, still having incontinence  No UTIs  Vesicare Trial improved some, but not at goal. Did not fill the Vesicare BID due to insurance  Rx for laxatives    Discussed cystoscopy Botox 100 u injection  She is agreeable  Risks: I discussed all the risks, benefits, alternatives and possible complications or surgery as well as expectations and post-op recovery. Prescriptions Ordered:  No orders of the defined types were placed in this encounter. Orders Placed:  Orders Placed This Encounter   Procedures    Culture, Urine     Standing Status:   Future     Standing Expiration Date:   11/4/2022     Order Specific Question:   Specify (ex-cath, midstream, cysto, etc)?      Answer:   MD Rom Peoples M.D, MD  [facility]

## 2021-11-04 NOTE — TELEPHONE ENCOUNTER
Pre op Risk Assessment    Procedure Cystoscopy bladder botox gen mac  Physician DR. Ryan Hickman   Date of surgery/procedure 12/9/2021    Last OV 6-  Last Stress 7/7/21  Last Echo 7/7/2021  Last Cath   Last Stent   Is patient on blood thinners 81, plavix   Hold Meds/how many days

## 2021-11-04 NOTE — TELEPHONE ENCOUNTER
[facility]  [address] [place] [address] [address] [place], [address]      Phone *[phone]   Surgical Scheduling Direct Line Phone *[phone] Fax *[phone]      Amarilys Natalie 1955 female    [address]  [address] [address]   Marital Status:          Home Phone: [phone]      Cell Phone:    Telephone Information:   Mobile [phone]          Surgeon: Dr. Praful Adams Surgery Date: 12/9/21   Time: 12:00 pm    Procedure: Cystoscopy, Bladder Botox 100 units    Diagnosis: Urinary Incontinence    Important Medical History: In Epic    Special Inst/Equip:     CPT Codes:    09012,   Latex Allergy: No     Cardiac Device:  No    Anesthesia:  MAC          Admission Type:  Same Day                        Admit Prior to Day of Surgery: No    Case Location:  Main OR            Preadmission Testing:  Phone Call          PAT Date and Time:______________________________________________________    PAT Confirmation #: ______________________________________________________    Post Op Visit: ___________________________________________________________    Need Preop Cardiac Clearance: Yes    Does Patient have Cardiologist/physician?      Dr Gisela Holguin Confirmation #: __________________________________________________    Jaswinder Foley: ________________________   Date: __________________________     [employer] Name: Medicaid

## 2021-11-04 NOTE — TELEPHONE ENCOUNTER
Patient scheduled for a Cystoscopy, Bladder Botox 100 units under MAC with Dr Gray Kuo on 12/9/21.  We are asking for clearance

## 2021-11-07 NOTE — TELEPHONE ENCOUNTER
Intermediate risk  Proceed  Continue ASA as tolerated  Make sure well hydrated due to LVOT obstruction

## 2021-11-12 ENCOUNTER — PREP FOR PROCEDURE (OUTPATIENT)
Dept: UROLOGY | Age: 66
End: 2021-11-12

## 2021-11-15 RX ORDER — SODIUM CHLORIDE 9 MG/ML
INJECTION, SOLUTION INTRAVENOUS CONTINUOUS
Status: CANCELLED | OUTPATIENT
Start: 2021-12-09

## 2021-11-24 ENCOUNTER — HOSPITAL ENCOUNTER (OUTPATIENT)
Age: 66
Discharge: HOME | End: 2021-11-24
Payer: MEDICAID

## 2021-11-24 DIAGNOSIS — N32.81 OAB (OVERACTIVE BLADDER): ICD-10-CM

## 2021-11-24 DIAGNOSIS — N39.490 URINARY INCONTINENCE, OVERFLOW: ICD-10-CM

## 2021-11-24 DIAGNOSIS — R35.0 URINARY FREQUENCY: ICD-10-CM

## 2021-11-24 DIAGNOSIS — N39.41 URGE INCONTINENCE OF URINE: ICD-10-CM

## 2021-11-24 PROCEDURE — 87086 URINE CULTURE/COLONY COUNT: CPT

## 2021-11-24 PROCEDURE — 87077 CULTURE AEROBIC IDENTIFY: CPT

## 2021-11-24 PROCEDURE — 87186 SC STD MICRODIL/AGAR DIL: CPT

## 2021-11-27 LAB
ORGANISM: ABNORMAL
URINE CULTURE, ROUTINE: ABNORMAL

## 2021-11-29 ENCOUNTER — TELEPHONE (OUTPATIENT)
Dept: UROLOGY | Age: 66
End: 2021-11-29

## 2021-11-29 RX ORDER — AMOXICILLIN AND CLAVULANATE POTASSIUM 875; 125 MG/1; MG/1
1 TABLET, FILM COATED ORAL 2 TIMES DAILY
Qty: 14 TABLET | Refills: 0 | Status: SHIPPED | OUTPATIENT
Start: 2021-11-29 | End: 2021-12-06

## 2021-12-01 NOTE — TELEPHONE ENCOUNTER
Have left 3 message to make sure she has picked up the script with instructions to call back to confirm.  Patient has not called back as of this date

## 2021-12-06 ENCOUNTER — TELEPHONE (OUTPATIENT)
Dept: UROLOGY | Age: 66
End: 2021-12-06

## 2021-12-08 ENCOUNTER — TELEPHONE (OUTPATIENT)
Dept: UROLOGY | Age: 66
End: 2021-12-08

## 2021-12-14 ENCOUNTER — OFFICE VISIT (OUTPATIENT)
Dept: CARDIOLOGY CLINIC | Age: 66
End: 2021-12-14
Payer: MEDICAID

## 2021-12-14 VITALS
HEIGHT: 64 IN | DIASTOLIC BLOOD PRESSURE: 78 MMHG | WEIGHT: 153.8 LBS | BODY MASS INDEX: 26.26 KG/M2 | SYSTOLIC BLOOD PRESSURE: 115 MMHG | HEART RATE: 92 BPM

## 2021-12-14 DIAGNOSIS — E78.5 DYSLIPIDEMIA: Primary | ICD-10-CM

## 2021-12-14 DIAGNOSIS — R25.2 MUSCLE CRAMPS: ICD-10-CM

## 2021-12-14 DIAGNOSIS — I10 ESSENTIAL HYPERTENSION: ICD-10-CM

## 2021-12-14 PROCEDURE — 99213 OFFICE O/P EST LOW 20 MIN: CPT | Performed by: NURSE PRACTITIONER

## 2021-12-14 RX ORDER — AMOXICILLIN 875 MG/1
875 TABLET, COATED ORAL 2 TIMES DAILY
COMMUNITY
End: 2022-01-14

## 2021-12-14 NOTE — PATIENT INSTRUCTIONS
Call your PCP to let him know cough is on-going - you may need another round of steroids. Continue medications as prescribed. Continue to cut down on your smoking. See Dr. Andrea Stevenson in one year or sooner if need.

## 2021-12-14 NOTE — PROGRESS NOTES
[facility] PROFESSIONAL SERVICES  [facility]   [address] [facility]   [address] [address]   Dept: [phone]   Dept Fax: [phone]: [phone]      Chief Complaint   Patient presents with    6 Month Follow-Up     Chief complaint: \"Doing ok except for this cough I can't seem to get rid of\". Cardiologist:  Dr. Daphne Carter 11/3/21 for bronchitis. Continues with cough. Continues to smoke. Has not had follow-up with PCP. Last seen in office on 6/16/21 in follow-up for CAD. Atypical chest pain  Sister with recent CABG  50% RCA hx  Bipolar disorder  EF 60%  3/6 ARRON  She is on DAPT, no bleeding, BP and HR well controlled. Was supposed to get GI work-up. Check Lexiscan first, then proceed GI. Check TTE as well. Discussed symptoms needing emergency care. Discussed diet/exercise/BP/weight loss/health lifestyle choices/lipids; the patient understands the goals and will try to comply.   Disposition:  1 year if no findings  Lexiscan stress 7/21; no ischemic findings    Today's visit:   Subjective:  Coughing continues  Continues to smoke 3/4 pack per day  Cramps in hands and feet for no reason at all  Some chest pressure - not sure if chest discomfort is from coughing  No lightheadedness or dizziness  Some swelling in feet and ankles - not so much today      General:   No fever, no chills, No fatigue or weight loss/gain  Pulmonary:    No dyspnea, no wheezing  Cardiac:    Denies recent chest discomfort or palpitations  GI:     No nausea or vomiting, no abdominal pain, no black or tarry stools, no blood in stools  Neuro:    No dizziness or light headedness  Musculoskeletal:  No recent active issues, no new musculoskeletal pain  Extremities:   No swelling or claudication symptoms      Past Medical History:   Diagnosis Date    Acute renal failure with acute cortical necrosis (HCC) 12/21/2019    Allergic rhinitis     Arthritis     spine    [facility]     Blood circulation, collateral     Cancer ([facility] 2011    cancerous polyps removed - Dr. Timothy Simon Colon polyps     COPD (chronic obstructive pulmonary disease) ([facility] 7/24/2014    [facility] 8/17/2019    Depression     Diverticulitis of colon     GERD (gastroesophageal reflux disease)     Hiatal hernia     History of arterial ischemic stroke 7/20/2019    History of colonoscopy 2002    History of kidney stones     Hx of blood clots 6/17/2014    PE and collar bone area after shoulder surgery    Hyperlipidemia     Hypertension     Liver disease     enlarged liver - damaged with alcohol in past but no cirrhosis per patient    Pneumonia 7/24/2014    Suicidal thoughts     2015 [facility]    Thyroid disease     Vomiting     Wears dentures     Wears glasses        Allergies   Allergen Reactions    Seasonal Other (See Comments)     sneezing       Current Outpatient Medications   Medication Sig Dispense Refill    amoxicillin (AMOXIL) 875 MG tablet Take 875 mg by mouth 2 times daily For 7 days      ibuprofen (ADVIL;MOTRIN) 600 MG tablet Take 600 mg by mouth every 6 hours as needed for Pain      QUEtiapine (SEROQUEL) 300 MG tablet Take 300 mg by mouth nightly 2 tab      carvedilol (COREG) 3.125 MG tablet TAKE 1 TABLET BY MOUTH TWICE DAILY      famotidine (PEPCID) 20 MG tablet Take 20 mg by mouth daily      aspirin 81 MG EC tablet Take 1 tablet by mouth daily 30 tablet 3    nitroGLYCERIN (NITROSTAT) 0.4 MG SL tablet up to max of 3 total doses.  If no relief after 1 dose, call 911. 25 tablet 3    hydrOXYzine (VISTARIL) 50 MG capsule Take 1 capsule by mouth 3 times daily as needed for Itching or Anxiety 90 capsule 0    topiramate (TOPAMAX) 50 MG tablet Take 1 tablet by mouth 2 times daily 60 tablet 3    sucralfate (CARAFATE) 1 GM tablet Take 1 tablet by mouth 4 times daily 120 tablet 3    QUEtiapine (SEROQUEL) 200 MG tablet Take 200 mg by mouth daily PM      clopidogrel (PLAVIX) 75 MG tablet Take 75 mg by mouth daily      albuterol (PROVENTIL) (2.5 MG/3ML) 0.083% nebulizer solution Take 3 mLs by nebulization every 6 hours as needed for Wheezing 120 each 0    Roflumilast (DALIRESP) 500 MCG tablet Take 500 mcg by mouth daily      Tiotropium Bromide Monohydrate (SPIRIVA HANDIHALER IN) Inhale 2 puffs into the lungs daily      acetaminophen (MAPAP) 325 MG tablet Take by mouth every 6 hours as needed for Pain 2 tab      potassium chloride (KLOR-CON M) 20 MEQ extended release tablet Take by mouth daily 2 tab      levothyroxine (SYNTHROID) 75 MCG tablet Take 1 tablet by mouth daily everyday except Sunday take 150 mcg. 30 tablet 0    fluticasone (FLONASE) 50 MCG/ACT nasal spray 1 spray by Each Nostril route 2 times daily 1 Bottle 0    ferrous sulfate 325 (65 Fe) MG tablet Take 1 tablet by mouth 2 times daily 30 tablet 0    folic acid (FOLVITE) 1 MG tablet Take 1 mg by mouth daily      ARIPiprazole (ABILIFY) 2 MG tablet Take 2 mg by mouth daily      atorvastatin (LIPITOR) 40 MG tablet Take 40 mg by mouth nightly       escitalopram (LEXAPRO) 20 MG tablet Take 30 mg by mouth daily       fenofibrate 160 MG tablet Take 160 mg by mouth daily       traZODone (DESYREL) 100 MG tablet Take 2 tablets by mouth nightly (Patient taking differently: Take 150 mg by mouth nightly 2 tabs prn) 30 tablet 0     No current facility-administered medications for this visit.        Social History     Socioeconomic History    Marital status:      Spouse name: None    Number of children: 3    Years of education: 15    Highest education level: None   Occupational History    Occupation: on disability     Employer: [employer]   Tobacco Use    Smoking status: Current Every Day Smoker     Packs/day: 0.50     Years: 30.00     Pack years: 15.00     Types: Cigarettes     Start date: 4/22/1977    Smokeless tobacco: Never Used   Vaping Use    Vaping Use: Never used   Substance and Sexual Activity    Alcohol use: No     Comment: none for 2 years    Drug use: Not Currently     Frequency: 1.0 times per week     Types: Cocaine     Comment: m-1    Sexual activity: Not Currently   Other Topics Concern    None   Social History Narrative    None     Social Determinants of Health     Financial Resource Strain:     Difficulty of Paying Living Expenses: Not on file   Food Insecurity:     Worried About Running Out of Food in the Last Year: Not on file    Kirstin of Food in the Last Year: Not on file   Transportation Needs:     Lack of Transportation (Medical): Not on file    Lack of Transportation (Non-Medical):  Not on file   Physical Activity:     Days of Exercise per Week: Not on file    Minutes of Exercise per Session: Not on file   Stress:     Feeling of Stress : Not on file   Social Connections:     Frequency of Communication with Friends and Family: Not on file    Frequency of Social Gatherings with Friends and Family: Not on file    Attends Rastafarian Services: Not on file    Active Member of [address] [place] [address] "2,10E+07" or Organizations: Not on file    Attends Club or Organization Meetings: Not on file    Marital Status: Not on file   Intimate Partner Violence:     Fear of Current or Ex-Partner: Not on file    Emotionally Abused: Not on file    Physically Abused: Not on file    Sexually Abused: Not on file   Housing Stability:     Unable to Pay for Housing in the Last Year: Not on file    Number of [place] in the Last Year: Not on file    Unstable Housing in the Last Year: Not on file       Family History   Problem Relation Age of Onset    Cancer Mother     Heart Disease Mother     High Blood Pressure Mother     High Cholesterol Mother     Cancer Father         brain    Depression Father     Heart Disease Father     High Cholesterol Father     Mental Illness Father     Cancer Sister     Heart Attack Sister     Heart Disease Maternal Uncle     High Cholesterol Maternal Uncle     Diabetes Maternal Grandmother     Heart Disease Maternal Grandmother     High Cholesterol Maternal Grandmother     Early Death Maternal Grandfather     Heart Disease Maternal Grandfather     High Cholesterol Maternal Grandfather     Stroke Maternal Grandfather     Heart Disease Paternal Grandmother     High Cholesterol Paternal Grandmother     Heart Disease Paternal Grandfather     High Cholesterol Paternal Grandfather        Blood pressure 115/78, pulse 92, height 5' 4\" (1.626 m), weight 153 lb 12.8 oz (69.8 kg). General:   Well developed, well nourished  Lungs:   Clear to auscultation  Heart:    Normal S1 S2, No murmur, rubs, or gallops  Abdomen:   Soft, non tender, no organomegalies, positive bowel sounds  Extremities:   No edema, no cyanosis, good peripheral pulses  Neurological:   Awake, alert, oriented. No obvious focal deficits  Musculoskeletal:  No obvious deformities    EKG:     Echo: 7/7/21  Summary   Normal left ventricular size and systolic function. There were no regional wall motion abnormalities. Wall thickness was within normal limits. Ejection fraction was estimated at 60-65%. Doppler parameters were consistent with abnormal left ventricular   relaxation (grade 1 diastolic dysfunction). Severe LVOT obstruction of ~ 5.0 m/s. There was mild aortic regurgitation. The mitral valve structure demonstrated calcification of the posterior   leaflet. Signature    ----------------------------------------------------------------   Electronically signed by Polina Luna MD (Interpreting   physician) on 07/07/2021 at 09:57 AM    11/17/2017: Echo   Summary   Normal left ventricle size and systolic function. Ejection fraction was   estimated at 60%. Doppler parameters were consistent with abnormal left ventricular   relaxation (grade 1 diastolic dysfunction).     Signature    ----------------------------------------------------------------   Electronically signed by Ham Kathleen MD (Interpreting   physician) on 2017 at 06:24 PM    Kelly Renuka 21  Summary   Myocardial perfusion imaging was normal at rest and with stress. Left ventricular systolic function was normal.      Recommendation   Clinical correlation is recommended. Aggressive risk factor management. Signatures      ----------------------------------------------------------------   Electronically signed by Jagdeep Morris MD (Interpreting   Cardiologist) on 2021 at 14:25      Cardiac Cath: 2017  CARDIAC CATHETERIZATION     PATIENT NAME: Mckenzie Holland                  :        1955  MED REC NO:   [id number]                           ROOM:  ACCOUNT NO:   [de-identified]                           ADMIT DATE: 2017  PROVIDER:     Harsha Martin     DATE OF PROCEDURE:  2017     INDICATION:  Unstable angina with history of multivessel PCI.     DESCRIPTION OF PROCEDURE:  After informed consent was obtained from the  patient, she was brought to the cardiac catheterization laboratory and  prepped in sterile fashion. Right femoral artery was chosen as the primary  point of access. Pre-procedure time-out was completed. After infiltration  of the right inguinal region with 2% lidocaine, using a micropuncture  technique, modified Seldinger technique, and fluoroscopic guidance, we  inserted a 6-Tamazight sheath into the right femoral artery. Standard JL4 and  JR4 diagnostic catheters were used. We performed coronary angiography  across the aortic valve with JR4 catheter.     CORONARY ANGIOGRAM:     LEFT MAIN:  The left main is free of any significant disease.     LAD:  The LAD has mild luminal irregularities, so it is free of any  significant obstructive disease. There are two small diagonal branches  without any significant obstructive disease.     LEFT CIRCUMFLEX:  Left circumflex has mild luminal irregularities, but it  is otherwise free of any significant obstructive disease. Gives rise to  small AV groove branch that bifurcates into a large OM1 branch, but again  it is free of any significant disease.     RCA:  The RCA is dominant and has mild luminal irregularities, but is  otherwise free of any significant obstructive disease.     LV:  The LV systolic pressure is 233 with left ventricular end diastolic  pressure of 21. There is no significant LV to AO systolic gradient noted.     FEMORAL ANGIOGRAM:  The femoral angiogram demonstrates common femoral  artery puncture without any significant complications. However, there is  mild disease since the artery is quite small, therefore we elected for   Manual hemostasis.     SUMMARY:  No significant obstructive coronary artery disease. LVEDP of 21.     PLAN:     1.  Optimal medical therapy. 2.  Risk factor management. 3.  Bed rest/groin care  3. Follow up with primary cardiologist or myself in two weeks post  procedure.     All the above was explained to the patient and the patient's family. They  were agreeable and amenable to the plan.   ASHLEY HARRIS   D: 2017 11:22:40      Cath: 2017  CARDIAC CATHETERIZATION     PATIENT NAME: Candelaria Mchugh                             :       1955  MED REC NO:   [id number]                                      ROOM:      0017  ACCOUNT NO:   [de-identified]                                      ADMISSION  DATE:  2017  PROVIDER:     FRITZ Ferrer     DATE OF PROCEDURE:  2017     INDICATION FOR PROCEDURE:  Presentation with chest discomfort, which  is likely to point towards non-Q-wave class 2 angina.     ROUTE:  Via right groin.   We attempted right radial, but we were not  able to advance the wire because of spasm and also the patient's hand  was very, very skinny and very small wrist setting ground for spasm.     HEMODYNAMICS:  Aortic opening pressure 113/68, /10, LVEDP was 23  indicative of moderate impaired relaxation of ventricle.     Consent was obtained from the patient after discussing the risks of  the procedure, not limited to less than 1% possibility of death,  stroke, heart attack, bleeding, dissection, pseudoaneurysm, limb loss,  and neurological injury were discussed with the patient. Despite the  risks, the patient agreed to undergo cardiac catheterization.     MEDICATIONS:  Fentanyl and Versed.     COMPLICATIONS:  None.     CATHETERS:  JR4, 3DRC, Mark right catheter, a JL4 catheter,  pigtail catheter, 5-[place]omb[place] sheath.     DESCRIPTION OF PROCEDURE:  The patient was brought to cardiac  catheterization lab and draped in the usual fashion. The right wrist  was numbed with 2% viscous lidocaine. An attempt to gain access was  made, but due to the patient's small wrist and hand, we were unable to  get access there and the sheath was not flushed. Next, the right  groin was then attempted and we first got an imaging of the groin  utilizing a _____ following the micropuncture, modified Seldinger  technique, we were able to get access. The sheath was flushed with  heparinized solution, so then over the J-wire, we mounted a JR4  catheter, ascended over the ascending aorta and descending aorta, and  pressures were stable. Catheter was flushed. We then attempted to  cannulate the right coronary system, which was not successful. This  was exchanged for the [place] right catheter which was utilized  successfully and we were able to cannulate the right coronary system. This then was exchanged for the [address] catheter, again similar finding,  and we were able to cannulate the left coronary system. Multiple  views of this were obtained. Satisfied with the views, this catheter  was disengaged. Next catheter introduced was a pigtail catheter into  the mid-LV cavity. Hemodynamic pressures were obtained.   Left  ventriculography was performed.     LEFT VENTRICULOGRAPHY:  Systolic ejection fraction was 67% and no  complication occurred.     CORONARY ANGIOGRAPHY:  The RCA is a dominant vessel, which gives rise  to PDA and PLV, which are both large caliber vessels. The PDA is a  large vessel without occlusive disease. PLV is a large caliber vessel  without occlusive disease. The RCA has about 50% mid-lesion.     The left main is a long large caliber vessel, which gives rise to  circumflex and LAD.     The circumflex is a large vessel and it has 25% mid-lesion.     LAD:  LAD is a large vessel which wraps around the apex, and I do  believe the LAD in the very proximal portion has about 25% lesion.     CONCLUSION:  1. Left ventriculography ejection fraction 60%. No hemodynamics. No  pullback. LVEDP moderately impaired relaxation of ventricle. 2.  Left main is a large vessel, widely patent. 3.  Circumflex is a large vessel with about 25% mid-lesion. 4. LAD is large vessel with about 25% very proximal lesion. 5.  RCA is a large vessel with about 50% lesion. No complication  occurred. Continue with aggressive risk factor modification.   No driving for the next couple of days.  Jacobo Fletcher D.O.     D: 09/06/2017 10:44:18   Diagnosis Orders   1. Dyslipidemia  Lipid, Fasting   2. Muscle cramps     3. Essential hypertension         Orders Placed This Encounter   Procedures    Lipid, Fasting     Standing Status:   Future     Standing Expiration Date:   12/14/2022     Continue Dr. Sabrina Crowder current treatment plan. Patient Instructions   Call your PCP to let him know cough is on-going - you may need another round of steroids. Continue medications as prescribed. Continue to cut down on your smoking. See Dr. Nikhil Strong in one year or sooner if need. Return in about 1 year (around 12/14/2022), or if symptoms worsen or fail to improve, for Dr. Nikhil Strong.       Mariah Neff, APRN - CNP

## 2021-12-15 ENCOUNTER — TELEPHONE (OUTPATIENT)
Dept: UROLOGY | Age: 66
End: 2021-12-15

## 2021-12-15 DIAGNOSIS — Z01.818 PRE-OP TESTING: ICD-10-CM

## 2021-12-15 DIAGNOSIS — N39.490 URINARY INCONTINENCE, OVERFLOW: ICD-10-CM

## 2021-12-15 DIAGNOSIS — N32.81 OAB (OVERACTIVE BLADDER): Primary | ICD-10-CM

## 2021-12-15 NOTE — TELEPHONE ENCOUNTER
SURGERY [address] [place], [address]                            Phone *[phone] *[phone]              Surgical Scheduling Direct Line Phone *[phone] Fax *[phone]        Robert Ulloa     1955          female     [address] [place] [address]              Marital Status:                                                      Home Phone: [phone]      Cell Phone:    Telephone Information:   Mobile [phone]                                            Surgeon: Dr. Shira Velasco            Surgery Date: 1/25/22                    Time: 12:30 pm     Procedure: Cystoscopy, Bladder Botox 100 units     Diagnosis: Urinary Incontinence     Important Medical History: In Epic     Special Inst/Equip:      CPT Codes:    68275,   Latex Allergy: No     Cardiac Device:  No     Anesthesia:    MAC                     Admission Type:  Same Day                        Admit Prior to Day of Surgery: No     Case Location:  Main OR            Preadmission Testing:  Phone Call                     PAT Date and Time:______________________________________________________     PAT Confirmation #: ______________________________________________________     Post Op Visit: ___________________________________________________________     Need Preop Cardiac Clearance: Yes     Does Patient have Cardiologist/physician?      Dr Joaquin Oneill     Surgery Confirmation #: __________________________________________________     : ________________________   Date: __________________________   [facility] Name: Medicaid

## 2021-12-15 NOTE — TELEPHONE ENCOUNTER
Patient's surgery rescheduled to 1/25/22 with Dr Wandy Diaz. Surgery consent on arrival. Patient to do pre op urine culture on 1/11/22. Dr Radha Jacobson cleared patient for 12/9/21 surgery. Patient is continue asa. Surgery instructions mailed to the patient.

## 2021-12-15 NOTE — TELEPHONE ENCOUNTER
Maggie Ramirez ( [id number]) scheduled for Botox. Ok to stay on 81 mg asa?   Read 12/15/2021 1:13 PM  12/15/2021 1:13 PM  Yes  12/15/2021 1:45 PM  Thanks

## 2022-01-03 ENCOUNTER — PREP FOR PROCEDURE (OUTPATIENT)
Dept: UROLOGY | Age: 67
End: 2022-01-03

## 2022-01-03 RX ORDER — SODIUM CHLORIDE 9 MG/ML
INJECTION, SOLUTION INTRAVENOUS CONTINUOUS
Status: CANCELLED | OUTPATIENT
Start: 2022-01-25

## 2022-01-11 ENCOUNTER — HOSPITAL ENCOUNTER (OUTPATIENT)
Age: 67
Discharge: HOME | End: 2022-01-11
Payer: MEDICAID

## 2022-01-11 LAB
ALBUMIN SERPL-MCNC: 4.3 G/DL (ref 3.5–5.1)
ALP BLD-CCNC: 95 U/L (ref 38–126)
ALT SERPL-CCNC: 19 U/L (ref 11–66)
ANION GAP SERPL CALCULATED.3IONS-SCNC: 11 MEQ/L (ref 8–16)
AST SERPL-CCNC: 19 U/L (ref 5–40)
AVERAGE GLUCOSE: 132 MG/DL (ref 70–126)
BASOPHILS # BLD: 0.5 %
BASOPHILS ABSOLUTE: 0 THOU/MM3 (ref 0–0.1)
BILIRUB SERPL-MCNC: 0.2 MG/DL (ref 0.3–1.2)
BUN BLDV-MCNC: 12 MG/DL (ref 7–22)
CALCIUM SERPL-MCNC: 10.6 MG/DL (ref 8.5–10.5)
CHLORIDE BLD-SCNC: 107 MEQ/L (ref 98–111)
CHOLESTEROL, TOTAL: 190 MG/DL (ref 100–199)
CO2: 26 MEQ/L (ref 23–33)
CREAT SERPL-MCNC: 0.9 MG/DL (ref 0.4–1.2)
CREATININE, URINE: 250.3 MG/DL
EOSINOPHIL # BLD: 1.1 %
EOSINOPHILS ABSOLUTE: 0.1 THOU/MM3 (ref 0–0.4)
ERYTHROCYTE [DISTWIDTH] IN BLOOD BY AUTOMATED COUNT: 13.8 % (ref 11.5–14.5)
ERYTHROCYTE [DISTWIDTH] IN BLOOD BY AUTOMATED COUNT: 45.4 FL (ref 35–45)
GFR SERPL CREATININE-BSD FRML MDRD: 63 ML/MIN/1.73M2
GLUCOSE BLD-MCNC: 134 MG/DL (ref 70–108)
HBA1C MFR BLD: 6.4 % (ref 4.4–6.4)
HCT VFR BLD CALC: 46.4 % (ref 37–47)
HDLC SERPL-MCNC: 35 MG/DL
HEMOGLOBIN: 14.6 GM/DL (ref 12–16)
IMMATURE GRANS (ABS): 0.05 THOU/MM3 (ref 0–0.07)
IMMATURE GRANULOCYTES: 0.5 %
LDL CHOLESTEROL CALCULATED: 113 MG/DL
LYMPHOCYTES # BLD: 32 %
LYMPHOCYTES ABSOLUTE: 3 THOU/MM3 (ref 1–4.8)
MCH RBC QN AUTO: 28.5 PG (ref 26–33)
MCHC RBC AUTO-ENTMCNC: 31.5 GM/DL (ref 32.2–35.5)
MCV RBC AUTO: 90.4 FL (ref 81–99)
MICROALBUMIN UR-MCNC: 12.89 MG/DL
MICROALBUMIN/CREAT UR-RTO: 51 MG/G (ref 0–30)
MONOCYTES # BLD: 7.4 %
MONOCYTES ABSOLUTE: 0.7 THOU/MM3 (ref 0.4–1.3)
NUCLEATED RED BLOOD CELLS: 0 /100 WBC
PLATELET # BLD: 307 THOU/MM3 (ref 130–400)
PMV BLD AUTO: 10.6 FL (ref 9.4–12.4)
POTASSIUM SERPL-SCNC: 4.4 MEQ/L (ref 3.5–5.2)
RBC # BLD: 5.13 MILL/MM3 (ref 4.2–5.4)
SEG NEUTROPHILS: 58.5 %
SEGMENTED NEUTROPHILS ABSOLUTE COUNT: 5.4 THOU/MM3 (ref 1.8–7.7)
SODIUM BLD-SCNC: 144 MEQ/L (ref 135–145)
TOTAL PROTEIN: 7.2 G/DL (ref 6.1–8)
TRIGL SERPL-MCNC: 208 MG/DL (ref 0–199)
TSH SERPL DL<=0.05 MIU/L-ACNC: 3.63 UIU/ML (ref 0.4–4.2)
WBC # BLD: 9.3 THOU/MM3 (ref 4.8–10.8)

## 2022-01-11 PROCEDURE — 87077 CULTURE AEROBIC IDENTIFY: CPT

## 2022-01-11 PROCEDURE — 87186 SC STD MICRODIL/AGAR DIL: CPT

## 2022-01-11 PROCEDURE — 83036 HEMOGLOBIN GLYCOSYLATED A1C: CPT

## 2022-01-11 PROCEDURE — 80061 LIPID PANEL: CPT

## 2022-01-11 PROCEDURE — 85025 COMPLETE CBC W/AUTO DIFF WBC: CPT

## 2022-01-11 PROCEDURE — 84443 ASSAY THYROID STIM HORMONE: CPT

## 2022-01-11 PROCEDURE — 80053 COMPREHEN METABOLIC PANEL: CPT

## 2022-01-11 PROCEDURE — 82043 UR ALBUMIN QUANTITATIVE: CPT

## 2022-01-11 PROCEDURE — 87086 URINE CULTURE/COLONY COUNT: CPT

## 2022-01-11 PROCEDURE — 36415 COLL VENOUS BLD VENIPUNCTURE: CPT

## 2022-01-13 LAB
ORGANISM: ABNORMAL
URINE CULTURE, ROUTINE: ABNORMAL

## 2022-01-14 ENCOUNTER — TELEPHONE (OUTPATIENT)
Dept: CARDIOLOGY CLINIC | Age: 67
End: 2022-01-14

## 2022-01-14 ENCOUNTER — TELEPHONE (OUTPATIENT)
Dept: UROLOGY | Age: 67
End: 2022-01-14

## 2022-01-14 DIAGNOSIS — Z51.81 ENCOUNTER FOR MONITORING STATIN THERAPY: Primary | ICD-10-CM

## 2022-01-14 DIAGNOSIS — Z79.899 ENCOUNTER FOR MONITORING STATIN THERAPY: Primary | ICD-10-CM

## 2022-01-14 DIAGNOSIS — E78.5 DYSLIPIDEMIA: ICD-10-CM

## 2022-01-14 RX ORDER — AMOXICILLIN AND CLAVULANATE POTASSIUM 875; 125 MG/1; MG/1
1 TABLET, FILM COATED ORAL 2 TIMES DAILY
Qty: 20 TABLET | Refills: 0 | Status: SHIPPED | OUTPATIENT
Start: 2022-01-14 | End: 2022-01-24

## 2022-01-14 NOTE — TELEPHONE ENCOUNTER
---- Message from BA Hodge - CNP sent at 1/14/2022  7:59 AM EST -----  Please let patient know to continue atorvastatin at current dose. Her triglycerides have improved but her LDL is above 100 - want less than 70 - will recheck labs in 3 months to see how she is doing.    Thank you,   Mariah

## 2022-01-24 ENCOUNTER — TELEPHONE (OUTPATIENT)
Dept: UROLOGY | Age: 67
End: 2022-01-24

## 2022-01-24 DIAGNOSIS — N32.81 OAB (OVERACTIVE BLADDER): Primary | ICD-10-CM

## 2022-01-24 DIAGNOSIS — Z01.818 PRE-OP TESTING: ICD-10-CM

## 2022-01-24 NOTE — TELEPHONE ENCOUNTER
Patient rescheduled her surgery with Dr Juan Suazo from 1/25/22 to 2/10/22. Patient is ill. Surgery notified.  Patient to repeat urine culture on 1/27/22

## 2022-02-03 ENCOUNTER — HOSPITAL ENCOUNTER (OUTPATIENT)
Age: 67
Discharge: HOME | End: 2022-02-03
Payer: MEDICAID

## 2022-02-03 DIAGNOSIS — N32.81 OAB (OVERACTIVE BLADDER): ICD-10-CM

## 2022-02-03 DIAGNOSIS — Z01.818 PRE-OP TESTING: ICD-10-CM

## 2022-02-03 PROCEDURE — 87086 URINE CULTURE/COLONY COUNT: CPT

## 2022-02-03 PROCEDURE — 87077 CULTURE AEROBIC IDENTIFY: CPT

## 2022-02-04 LAB
ORGANISM: ABNORMAL
URINE CULTURE, ROUTINE: ABNORMAL

## 2022-02-07 ENCOUNTER — TELEPHONE (OUTPATIENT)
Dept: UROLOGY | Age: 67
End: 2022-02-07

## 2022-02-07 RX ORDER — AMOXICILLIN AND CLAVULANATE POTASSIUM 875; 125 MG/1; MG/1
1 TABLET, FILM COATED ORAL 2 TIMES DAILY
Qty: 14 TABLET | Refills: 0 | Status: SHIPPED | OUTPATIENT
Start: 2022-02-07 | End: 2022-02-14

## 2022-02-10 ENCOUNTER — HOSPITAL ENCOUNTER (OUTPATIENT)
Age: 67
Setting detail: OUTPATIENT SURGERY
Discharge: HOME | End: 2022-02-10
Attending: UROLOGY | Admitting: UROLOGY
Payer: MEDICAID

## 2022-02-10 ENCOUNTER — ANESTHESIA EVENT (OUTPATIENT)
Dept: OPERATING ROOM | Age: 67
End: 2022-02-10
Payer: MEDICAID

## 2022-02-10 ENCOUNTER — ANESTHESIA (OUTPATIENT)
Dept: OPERATING ROOM | Age: 67
End: 2022-02-10
Payer: MEDICAID

## 2022-02-10 VITALS
SYSTOLIC BLOOD PRESSURE: 123 MMHG | DIASTOLIC BLOOD PRESSURE: 71 MMHG | RESPIRATION RATE: 16 BRPM | HEART RATE: 78 BPM | TEMPERATURE: 96.2 F | OXYGEN SATURATION: 99 %

## 2022-02-10 VITALS
DIASTOLIC BLOOD PRESSURE: 59 MMHG | OXYGEN SATURATION: 98 % | SYSTOLIC BLOOD PRESSURE: 103 MMHG | RESPIRATION RATE: 26 BRPM

## 2022-02-10 LAB — INR BLD: 1.06 (ref 0.85–1.13)

## 2022-02-10 PROCEDURE — 2580000003 HC RX 258: Performed by: UROLOGY

## 2022-02-10 PROCEDURE — 6360000002 HC RX W HCPCS: Performed by: UROLOGY

## 2022-02-10 PROCEDURE — 3600000002 HC SURGERY LEVEL 2 BASE: Performed by: UROLOGY

## 2022-02-10 PROCEDURE — 7100000011 HC PHASE II RECOVERY - ADDTL 15 MIN: Performed by: UROLOGY

## 2022-02-10 PROCEDURE — 3700000001 HC ADD 15 MINUTES (ANESTHESIA): Performed by: UROLOGY

## 2022-02-10 PROCEDURE — 6360000002 HC RX W HCPCS: Performed by: NURSE ANESTHETIST, CERTIFIED REGISTERED

## 2022-02-10 PROCEDURE — 3600000012 HC SURGERY LEVEL 2 ADDTL 15MIN: Performed by: UROLOGY

## 2022-02-10 PROCEDURE — 7100000010 HC PHASE II RECOVERY - FIRST 15 MIN: Performed by: UROLOGY

## 2022-02-10 PROCEDURE — 2709999900 HC NON-CHARGEABLE SUPPLY: Performed by: UROLOGY

## 2022-02-10 PROCEDURE — 85610 PROTHROMBIN TIME: CPT

## 2022-02-10 PROCEDURE — 6370000000 HC RX 637 (ALT 250 FOR IP)

## 2022-02-10 PROCEDURE — 3700000000 HC ANESTHESIA ATTENDED CARE: Performed by: UROLOGY

## 2022-02-10 PROCEDURE — 36415 COLL VENOUS BLD VENIPUNCTURE: CPT

## 2022-02-10 RX ORDER — HYDROCODONE BITARTRATE AND ACETAMINOPHEN 5; 325 MG/1; MG/1
1 TABLET ORAL ONCE
Status: COMPLETED | OUTPATIENT
Start: 2022-02-10 | End: 2022-02-10

## 2022-02-10 RX ORDER — PROPOFOL 10 MG/ML
INJECTION, EMULSION INTRAVENOUS PRN
Status: DISCONTINUED | OUTPATIENT
Start: 2022-02-10 | End: 2022-02-10 | Stop reason: SDUPTHER

## 2022-02-10 RX ORDER — MIDAZOLAM HYDROCHLORIDE 1 MG/ML
INJECTION INTRAMUSCULAR; INTRAVENOUS PRN
Status: DISCONTINUED | OUTPATIENT
Start: 2022-02-10 | End: 2022-02-10 | Stop reason: SDUPTHER

## 2022-02-10 RX ORDER — PHENAZOPYRIDINE HYDROCHLORIDE 200 MG/1
200 TABLET, FILM COATED ORAL 3 TIMES DAILY PRN
Qty: 9 TABLET | Refills: 0 | Status: ON HOLD | OUTPATIENT
Start: 2022-02-10

## 2022-02-10 RX ORDER — CIPROFLOXACIN 500 MG/1
500 TABLET, FILM COATED ORAL 2 TIMES DAILY
Qty: 10 TABLET | Refills: 0 | Status: SHIPPED | OUTPATIENT
Start: 2022-02-10 | End: 2022-02-15

## 2022-02-10 RX ORDER — HYDROCODONE BITARTRATE AND ACETAMINOPHEN 5; 325 MG/1; MG/1
TABLET ORAL
Status: COMPLETED
Start: 2022-02-10 | End: 2022-02-10

## 2022-02-10 RX ORDER — SODIUM CHLORIDE 9 MG/ML
INJECTION, SOLUTION INTRAVENOUS CONTINUOUS
Status: DISCONTINUED | OUTPATIENT
Start: 2022-02-10 | End: 2022-02-10 | Stop reason: HOSPADM

## 2022-02-10 RX ADMIN — PROPOFOL 50 MG: 10 INJECTION, EMULSION INTRAVENOUS at 15:34

## 2022-02-10 RX ADMIN — PROPOFOL 50 MG: 10 INJECTION, EMULSION INTRAVENOUS at 15:26

## 2022-02-10 RX ADMIN — MIDAZOLAM 2 MG: 1 INJECTION INTRAMUSCULAR; INTRAVENOUS at 15:35

## 2022-02-10 RX ADMIN — CEFAZOLIN 2000 MG: 10 INJECTION, POWDER, FOR SOLUTION INTRAVENOUS at 15:27

## 2022-02-10 RX ADMIN — HYDROCODONE BITARTRATE AND ACETAMINOPHEN 1 TABLET: 5; 325 TABLET ORAL at 16:00

## 2022-02-10 RX ADMIN — PROPOFOL 20 MG: 10 INJECTION, EMULSION INTRAVENOUS at 15:29

## 2022-02-10 RX ADMIN — PROPOFOL 30 MG: 10 INJECTION, EMULSION INTRAVENOUS at 15:31

## 2022-02-10 RX ADMIN — PROPOFOL 50 MG: 10 INJECTION, EMULSION INTRAVENOUS at 15:36

## 2022-02-10 RX ADMIN — SODIUM CHLORIDE: 9 INJECTION, SOLUTION INTRAVENOUS at 13:53

## 2022-02-10 ASSESSMENT — PULMONARY FUNCTION TESTS
PIF_VALUE: 0

## 2022-02-10 ASSESSMENT — PAIN SCALES - GENERAL
PAINLEVEL_OUTOF10: 10
PAINLEVEL_OUTOF10: 10
PAINLEVEL_OUTOF10: 8
PAINLEVEL_OUTOF10: 8

## 2022-02-10 ASSESSMENT — PAIN - FUNCTIONAL ASSESSMENT: PAIN_FUNCTIONAL_ASSESSMENT: 0-10

## 2022-02-10 NOTE — ANESTHESIA PRE PROCEDURE
Department of Anesthesiology  Preprocedure Note       Name:  Branden Garner   Age:  61 y.o.  :  1955                                          MRN:  [id number]         Date:  2/10/2022      Surgeon: Robert Jewell):  Kimmy Clinton MD    Procedure: Procedure(s):  CYSTOSCOPY URETHRAL IMPLANT INJECTION    Medications prior to admission:   Prior to Admission medications    Medication Sig Start Date End Date Taking?  Authorizing Provider   amoxicillin-clavulanate (AUGMENTIN) 875-125 MG per tablet Take 1 tablet by mouth 2 times daily for 7 days 22 Yes BA Tompkins - CNP   carvedilol (COREG) 3.125 MG tablet TAKE 1 TABLET BY MOUTH TWICE DAILY 20  Yes Historical Provider, MD   famotidine (PEPCID) 20 MG tablet Take 20 mg by mouth daily   Yes Historical Provider, MD   hydrOXYzine (VISTARIL) 50 MG capsule Take 1 capsule by mouth 3 times daily as needed for Itching or Anxiety 21  Yes Mychal Miller MD   topiramate (TOPAMAX) 50 MG tablet Take 1 tablet by mouth 2 times daily 21  Yes Mychal Miller MD   sucralfate (CARAFATE) 1 GM tablet Take 1 tablet by mouth 4 times daily 21  Yes Mychal Miller MD   QUEtiapine (SEROQUEL) 200 MG tablet Take 200 mg by mouth daily PM   Yes Historical Provider, MD   clopidogrel (PLAVIX) 75 MG tablet Take 75 mg by mouth daily   Yes Historical Provider, MD   albuterol (PROVENTIL) (2.5 MG/3ML) 0.083% nebulizer solution Take 3 mLs by nebulization every 6 hours as needed for Wheezing 20  Yes Jennifer Ortega APRN - CNP   Roflumilast (DALIRESP) 500 MCG tablet Take 500 mcg by mouth daily   Yes Historical Provider, MD   Tiotropium Bromide Monohydrate (SPIRIVA HANDIHALER IN) Inhale 2 puffs into the lungs daily   Yes Historical Provider, MD   acetaminophen (MAPAP) 325 MG tablet Take by mouth every 6 hours as needed for Pain 2 tab   Yes Historical Provider, MD   potassium chloride (KLOR-CON M) 20 MEQ extended release tablet Take by mouth daily 2 tab 20  Yes Historical Provider, MD   levothyroxine (SYNTHROID) 75 MCG tablet Take 1 tablet by mouth daily everyday except Sunday take 150 mcg. 12/26/19  Yes BA Wheeler CNP   fluticasone (FLONASE) 50 MCG/ACT nasal spray 1 spray by Each Nostril route 2 times daily 12/26/19  Yes BA Diaz CNP   ferrous sulfate 325 (65 Fe) MG tablet Take 1 tablet by mouth 2 times daily 12/26/19  Yes BA Diaz CNP   folic acid (FOLVITE) 1 MG tablet Take 1 mg by mouth daily   Yes Historical Provider, MD   ARIPiprazole (ABILIFY) 2 MG tablet Take 2 mg by mouth daily   Yes Historical Provider, MD   atorvastatin (LIPITOR) 40 MG tablet Take 40 mg by mouth nightly    Yes Historical Provider, MD   escitalopram (LEXAPRO) 20 MG tablet Take 30 mg by mouth daily    Yes Historical Provider, MD   fenofibrate 160 MG tablet Take 160 mg by mouth daily    Yes Historical Provider, MD   traZODone (DESYREL) 100 MG tablet Take 2 tablets by mouth nightly  Patient taking differently: Take 150 mg by mouth nightly 2 tabs prn 10/31/18  Yes Lois Gillis MD   ibuprofen (ADVIL;MOTRIN) 600 MG tablet Take 600 mg by mouth every 6 hours as needed for Pain    Historical Provider, MD   QUEtiapine (SEROQUEL) 300 MG tablet Take 300 mg by mouth nightly 2 tab    Historical Provider, MD   aspirin 81 MG EC tablet Take 1 tablet by mouth daily 2/22/21   Mychal Miller MD   nitroGLYCERIN (NITROSTAT) 0.4 MG SL tablet up to max of 3 total doses.  If no relief after 1 dose, call 911. 2/22/21   Mychal Miller MD       Current medications:    Current Facility-Administered Medications   Medication Dose Route Frequency Provider Last Rate Last Admin    0.9 % sodium chloride infusion   IntraVENous Continuous Sami Lanier  mL/hr at 02/10/22 1353 New Bag at 02/10/22 1353    ceFAZolin (ANCEF) 2000 mg in dextrose 5 % 50 mL IVPB  2,000 mg IntraVENous 30 Min Pre-Op Kimmy Clinton MD        onabotulinumtoxin A (BOTOX) injection 100 Units  100 Units IntraMUSCular Once Tyrese Cervantes MD           Allergies: Allergies   Allergen Reactions    Seasonal Other (See Comments)     sneezing       Problem List:    Patient Active Problem List   Diagnosis Code    GERD (gastroesophageal reflux disease) K21.9    Colon polyps K63.5    Chest pain, atypical R07.89    Gastroenteritis K52.9    Pneumonia J18.9    Shoulder pain M25.519    History of pulmonary embolism Z86.711    COPD (chronic obstructive pulmonary disease) ([facility] J44.9    Hypoglycemia E16.2    Anticoagulated on Coumadin Z79.01    Bipolar disorder ([facility]) F31.9    Cannabis abuse F12.10    Cocaine abuse ([facility] F14.10    Alcohol dependence in remission ([facility] F10.21    Cholecystitis with cholelithiasis K80.10    GI bleed K92.2    Erosive esophagitis K22.10    Hypokalemia E87.6    HTN (hypertension), benign I10    Bipolar 1 disorder ([facility]) F31.9    Chronic pain G89.29    Hiatal hernia K44.9    Chest pain R07.9    Vomiting R11.10    Erosive gastritis K29.60    H pylori ulcer K27.9, B96.81    Hematemesis K92.0    History of Helicobacter pylori infection Z86.19    Intractable abdominal pain R10.9    Intractable vomiting with nausea R11.2    Epigastric abdominal pain R10.13    Acute blood loss anemia D62    Chronic chest pain R07.9, G89.29    Hyponatremia F29.1    Metabolic acidosis W48.3    Essential hypertension I10    COPD with acute exacerbation ([facility]) J44.1    Hypothyroidism E03.9    History of DVT (deep vein thrombosis) Z86.718    Depression F32. A    Tobacco abuse Z72.0    Hematemesis with nausea K92.0    Hypoxia R09.02    Pleural effusion, bilateral J90    Suicidal ideation R45.851    Bipolar disorder, in partial remission, most recent episode depressed ([facility]) F31.75    Bipolar II disorder ([facility] O24.08    Diastolic dysfunction N97.41    Angina, class II ([facility] I20.9    Unstable angina ([facility]) I20.0    Dyslipidemia E78.5    Electrolyte abnormality E87.8    COPD exacerbation ([facility]) J44.1    Acute respiratory insufficiency R06.89    Chronic diastolic heart failure ([facility]) I50.32    Tobacco use disorder F17.200    Nasal septal deviation J34.2    Hypertrophy of left inferior nasal turbinate J34.3    Rhinogenic headache R51.9    Asymmetrical sensorineural hearing loss H90.3    Tinnitus, left ear H93.12    Psychosis ([facility]) F29    Substance-induced psychotic disorder ([facility]) F19.959    Bipolar 1 disorder, depressed ([facility]) F31.9    Polysubstance abuse ([facility] F19.10    Major depressive disorder, recurrent ([facility]) F33.9    Stroke-like symptom R29.90    Right arm weakness R29.898    Delirium R41.0    Paresthesias R20.2    Depression, major, recurrent ([facility]) F33.9    Depression with suicidal ideation F32. A, R45.851    Amnesia R41.3    Bipolar disorder, current episode mixed, moderate ([facility]) F31.62    Tjhaekfzd-Jtaaoed-Iccwbv disease M22.40    Carpal tunnel syndrome of left wrist G56.02    Change of, skin texture R23.4    Chronic midline low back pain without sciatica M54.50, G89.29    Coronary vasospasm ([facility]) I20.1    Derangement of knee M23.90    Disorder associated with Helicobacter species O74.20    Disturbance of skin sensation R20.9    Encounter for surgical follow-up care Z48.89    Endometriosis N80.9    Environmental and seasonal allergies J30.89    Glaucoma suspect H40.009    History of arterial ischemic stroke Z86.73    Mixed hyperlipidemia E78.2    Large liver R16.0    Lesion of ulnar nerve G56.20    Nasal congestion R09.81    Nicotine dependence F17.200    Pancreatic insufficiency K86.89    Primary osteoarthritis involving multiple joints M89.49    Sciatica M54.30    Sprain of right foot S93.601A    Preoperative state [id number]    Type 2 diabetes mellitus without complication, without long-term current use of insulin ([facility]) E11.9    Urinary incontinence, overflow N39.490    Elevated lactic acid level R79.89    Acute renal failure with acute cortical necrosis (HCC) N17.1    Pyelonephritis of left kidney N12    Pyelonephritis N12    Septicemia ([address] A41.9       Past Medical History:        Diagnosis Date    Acute renal failure with acute cortical necrosis (HCC) 12/21/2019    Allergic rhinitis     Arthritis     spine    Bipolar 1 disorder ([address]     Blood circulation, collateral     Cancer ([address] 2011    cancerous polyps removed - Dr. Luis Peraza Colon polyps     COPD (chronic obstructive pulmonary disease) ([address] 7/24/2014    Coronary vasospasm ([address] 8/17/2019    Depression     Diverticulitis of colon     GERD (gastroesophageal reflux disease)     Hiatal hernia     History of arterial ischemic stroke 7/20/2019    History of colonoscopy 2002    History of kidney stones     Hx of blood clots 6/17/2014    PE and collar bone area after shoulder surgery    Hyperlipidemia     Hypertension     Liver disease     enlarged liver - damaged with alcohol in past but no cirrhosis per patient    Pneumonia 7/24/2014    Suicidal thoughts     2015 [facility] to  [facility]    Thyroid disease     Vomiting     Wears dentures     Wears glasses        Past Surgical History:        Procedure Laterality Date    ABDOMEN SURGERY      x4 removed cysts and ovary removed    CARDIAC SURGERY      heart caths, no stents    CARPAL TUNNEL RELEASE Bilateral 2016    CHOLECYSTECTOMY, LAPAROSCOPIC  6/12/15    Dr. Paris Nuñez N/A 7/13/2020    CYSTOSCOPY WITH BLADDER BIOPSIES performed by Ej Heredia MD at [facility], [address] [facility] Right 10/7/2004    Dr. Radha Virk Bilateral 2016    decompression   Jony Elaine Montesinos -TORITO with [address]  2004, 2014    right shoulder    SHOULDER SURGERY  2014    right    SKIN BIOPSY  2006    under left eye    STIMULATOR SURGERY N/A 11/4/2020    STAGE 1 INTERSTIM PLACEMENT performed by Anna Sharma MD at [facility] 11/23/2020    PLACEMENT OF STAGE II INTERSTIM performed by Anna Sharma MD at [address] [place] [address] History:    Social History     Tobacco Use    Smoking status: Current Every Day Smoker     Packs/day: 0.50     Years: 30.00     Pack years: 15.00     Types: Cigarettes     Start date: 4/22/1977    Smokeless tobacco: Never Used   Substance Use Topics    Alcohol use: No     Comment: none for 2 years                                Ready to quit: Not Answered  Counseling given: Not Answered      Vital Signs (Current):   Vitals:    02/10/22 1326   BP: (!) 140/75   Pulse: 92   Resp: 20   Temp: 98.4 °F (36.9 °C)   TempSrc: Tympanic   SpO2: 96%                                              BP Readings from Last 3 Encounters:   02/10/22 (!) 140/75   12/14/21 115/78   11/03/21 134/70       NPO Status: Time of last liquid consumption: 2300                        Time of last solid consumption: 2300                        Date of last liquid consumption: 02/09/22                        Date of last solid food consumption: 02/09/22    BMI:   Wt Readings from Last 3 Encounters:   12/14/21 153 lb 12.8 oz (69.8 kg)   11/04/21 152 lb (68.9 kg)   11/03/21 140 lb (63.5 kg)     There is no height or weight on file to calculate BMI.    CBC:   Lab Results   Component Value Date    WBC 9.3 01/11/2022    RBC 5.13 01/11/2022    RBC 4.41 04/19/2012    HGB 14.6 01/11/2022    HCT 46.4 01/11/2022    MCV 90.4 01/11/2022    RDW 14.5 12/12/2019     01/11/2022       CMP:   Lab Results   Component Value Date     01/11/2022    K 4.4 01/11/2022    K 3.6 11/03/2021     01/11/2022    CO2 26 01/11/2022    BUN 12 01/11/2022    CREATININE 0.9 01/11/2022    GFRAA >60 02/25/2019    LABGLOM 63 01/11/2022    GLUCOSE 134 01/11/2022    GLUCOSE 155 12/12/2019    PROT 7.2 01/11/2022    CALCIUM 10.6 01/11/2022    BILITOT 0.2 01/11/2022    ALKPHOS 95 01/11/2022    AST 19 01/11/2022    ALT 19 01/11/2022       POC Tests: No results for input(s): POCGLU, POCNA, POCK, POCCL, POCBUN, POCHEMO, POCHCT in the last 72 hours. Coags:   Lab Results   Component Value Date    PROTIME 14.6 12/12/2019    INR 1.12 02/20/2021    APTT 33.0 02/20/2021       HCG (If Applicable): No results found for: PREGTESTUR, PREGSERUM, HCG, HCGQUANT     ABGs: No results found for: PHART, PO2ART, IWI1LGC, FSY2MDB, BEART, H4RNBEFU     Type & Screen (If Applicable):  Lab Results   Component Value Date    LABRH POS 11/17/2017       Drug/Infectious Status (If Applicable):  Lab Results   Component Value Date    HEPCAB Negative 10/12/2015       COVID-19 Screening (If Applicable):   Lab Results   Component Value Date    COVID19 NOT  DETECTED 11/03/2021    COVID19 Not Detected 11/16/2020           Anesthesia Evaluation    Airway: Mallampati: II  TM distance: >3 FB   Neck ROM: full  Mouth opening: > = 3 FB Dental:          Pulmonary: breath sounds clear to auscultation  (+) COPD:                             Cardiovascular:    (+) hypertension:, CAD:,         Rhythm: regular                      Neuro/Psych:   (+) CVA:, psychiatric history:            GI/Hepatic/Renal:   (+) hiatal hernia, GERD:, PUD, liver disease:,           Endo/Other:    (+) Diabetes, hypothyroidism::., .                 Abdominal:             Vascular: Other Findings:             Anesthesia Plan      MAC     ASA 4     (Possible GA)  Induction: intravenous. MIPS: Prophylactic antiemetics administered. Anesthetic plan and risks discussed with patient and sibling. Plan discussed with CRNA.                   Mario Nicholas MD   2/10/2022

## 2022-02-10 NOTE — H&P
History and Physical    Patient:  Hayley Enriquez  MRN: [id number]  YOB: 1955    CHIEF COMPLAINT:  OAB, incontinence    HISTORY OF PRESENT ILLNESS:   The patient is a 61 y.o. female who presents with as above, here for surgery    Patient's old records, notes and chart reviewed and summarized above.      Past Medical History:    Past Medical History:   Diagnosis Date    Acute renal failure with acute cortical necrosis (HCC) 12/21/2019    Allergic rhinitis     Arthritis     spine    Bipolar 1 disorder ([address]     Blood circulation, collateral     Cancer ([address] 2011    cancerous polyps removed - Dr. Shellie Orellana Colon polyps     COPD (chronic obstructive pulmonary disease) [facility] 7/24/2014    Coronary vasospasm ([facility] 8/17/2019    Depression     Diverticulitis of colon     GERD (gastroesophageal reflux disease)     Hiatal hernia     History of arterial ischemic stroke 7/20/2019    History of colonoscopy 2002    History of kidney stones     Hx of blood clots 6/17/2014    PE and collar bone area after shoulder surgery    Hyperlipidemia     Hypertension     Liver disease     enlarged liver - damaged with alcohol in past but no cirrhosis per patient    Pneumonia 7/24/2014    Suicidal thoughts     2015 admitted to  from [facility]    Thyroid disease     Vomiting     Wears dentures     Wears glasses        Past Surgical History:    Past Surgical History:   Procedure Laterality Date    ABDOMEN SURGERY      x4 removed cysts and ovary removed    CARDIAC SURGERY      heart caths, no stents    CARPAL TUNNEL RELEASE Bilateral 2016    CHOLECYSTECTOMY, LAPAROSCOPIC  6/12/15    Dr. Ngozi Monteiro    COLONOSCOPY  2011    CYSTOSCOPY W BIOPSY OF BLADDER N/A 7/13/2020    CYSTOSCOPY WITH BLADDER BIOPSIES performed by Pop Jesus MD at [address], ESOPHAGUS      ELBOW SURGERY Right 10/7/2004    Dr. Martinez Gear Bilateral 2016    decompression   Verónica Lute     [address] with [facility]    ROTATOR CUFF REPAIR  2004, 2014    right shoulder    SHOULDER SURGERY  2014    right    SKIN BIOPSY  2006    under left eye    STIMULATOR SURGERY N/A 11/4/2020    STAGE 1 INTERSTIM PLACEMENT performed by Etelvina Alexandra MD at [place] [address] 11/23/2020    PLACEMENT OF STAGE II INTERSTIM performed by Etelvina Alexandra MD at [place] [facility]     Medications Prior to Admission:    Prior to Admission medications    Medication Sig Start Date End Date Taking?  Authorizing Provider   amoxicillin-clavulanate (AUGMENTIN) 875-125 MG per tablet Take 1 tablet by mouth 2 times daily for 7 days 2/7/22 2/14/22 Yes BA Tompkins - CNP   carvedilol (COREG) 3.125 MG tablet TAKE 1 TABLET BY MOUTH TWICE DAILY 7/1/20  Yes Historical Provider, MD   famotidine (PEPCID) 20 MG tablet Take 20 mg by mouth daily   Yes Historical Provider, MD   hydrOXYzine (VISTARIL) 50 MG capsule Take 1 capsule by mouth 3 times daily as needed for Itching or Anxiety 2/22/21  Yes Jimy Newsome MD   topiramate (TOPAMAX) 50 MG tablet Take 1 tablet by mouth 2 times daily 2/22/21  Yes Jimy Newsome MD   sucralfate (CARAFATE) 1 GM tablet Take 1 tablet by mouth 4 times daily 2/22/21  Yes Jimy Newsome MD   QUEtiapine (SEROQUEL) 200 MG tablet Take 200 mg by mouth daily PM   Yes Historical Provider, MD   clopidogrel (PLAVIX) 75 MG tablet Take 75 mg by mouth daily   Yes Historical Provider, MD   albuterol (PROVENTIL) (2.5 MG/3ML) 0.083% nebulizer solution Take 3 mLs by nebulization every 6 hours as needed for Wheezing 8/26/20  Yes Jennifer Ortega APRN - CNP   Roflumilast (DALIRESP) 500 MCG tablet Take 500 mcg by mouth daily   Yes Historical Provider, MD   Tiotropium Bromide Monohydrate (SPIRIVA HANDIHALER IN) Inhale 2 puffs into the lungs daily   Yes Historical Provider, MD   acetaminophen (MAPAP) 325 MG tablet Take by mouth every 6 hours as needed for Pain 2 tab   Yes Historical Provider, MD potassium chloride (KLOR-CON M) 20 MEQ extended release tablet Take by mouth daily 2 tab 5/20/20  Yes Historical Provider, MD   levothyroxine (SYNTHROID) 75 MCG tablet Take 1 tablet by mouth daily everyday except Sunday take 150 mcg. 12/26/19  Yes BA Paiz CNP   fluticasone (FLONASE) 50 MCG/ACT nasal spray 1 spray by Each Nostril route 2 times daily 12/26/19  Yes BA Diaz CNP   ferrous sulfate 325 (65 Fe) MG tablet Take 1 tablet by mouth 2 times daily 12/26/19  Yes BA Diaz - CNP   folic acid (FOLVITE) 1 MG tablet Take 1 mg by mouth daily   Yes Historical Provider, MD   ARIPiprazole (ABILIFY) 2 MG tablet Take 2 mg by mouth daily   Yes Historical Provider, MD   atorvastatin (LIPITOR) 40 MG tablet Take 40 mg by mouth nightly    Yes Historical Provider, MD   escitalopram (LEXAPRO) 20 MG tablet Take 30 mg by mouth daily    Yes Historical Provider, MD   fenofibrate 160 MG tablet Take 160 mg by mouth daily    Yes Historical Provider, MD   traZODone (DESYREL) 100 MG tablet Take 2 tablets by mouth nightly  Patient taking differently: Take 150 mg by mouth nightly 2 tabs prn 10/31/18  Yes Merna Fletcher MD   ibuprofen (ADVIL;MOTRIN) 600 MG tablet Take 600 mg by mouth every 6 hours as needed for Pain    Historical Provider, MD   QUEtiapine (SEROQUEL) 300 MG tablet Take 300 mg by mouth nightly 2 tab    Historical Provider, MD   aspirin 81 MG EC tablet Take 1 tablet by mouth daily 2/22/21   Sruthi Arango MD   nitroGLYCERIN (NITROSTAT) 0.4 MG SL tablet up to max of 3 total doses.  If no relief after 1 dose, call 911. 2/22/21   Sruthi Arango MD       Allergies:  Seasonal    Social History:    Social History     Socioeconomic History    Marital status:      Spouse name: Not on file    Number of children: 3    Years of education: 15    Highest education level: Not on file   Occupational History    Occupation: on disability     Employer: [employer]   Tobacco Use    Smoking status: Current Every Day Smoker     Packs/day: 0.50     Years: 30.00     Pack years: 15.00     Types: Cigarettes     Start date: 4/22/1977    Smokeless tobacco: Never Used   Vaping Use    Vaping Use: Never used   Substance and Sexual Activity    Alcohol use: No     Comment: none for 2 years    Drug use: Not Currently     Frequency: 1.0 times per week     Types: Cocaine     Comment: m-1    Sexual activity: Not Currently   Other Topics Concern    Not on file   Social History Narrative    Not on file     Social Determinants of Health     Financial Resource Strain:     Difficulty of Paying Living Expenses: Not on file   Food Insecurity:     Worried About Running Out of Food in the Last Year: Not on file    Kirstin of Food in the Last Year: Not on file   Transportation Needs:     Lack of Transportation (Medical): Not on file    Lack of Transportation (Non-Medical):  Not on file   Physical Activity:     Days of Exercise per Week: Not on file    Minutes of Exercise per Session: Not on file   Stress:     Feeling of Stress : Not on file   Social Connections:     Frequency of Communication with Friends and Family: Not on file    Frequency of Social Gatherings with Friends and Family: Not on file    Attends [employer] Services: Not on file    Active Member of [address] [place] [address] [employer] or Organizations: Not on file    Attends Club or Organization Meetings: Not on file    Marital Status: Not on file   Intimate Partner Violence:     Fear of Current or Ex-Partner: Not on file    Emotionally Abused: Not on file    Physically Abused: Not on file    Sexually Abused: Not on file   Housing Stability:     Unable to Pay for Housing in the Last Year: Not on file    Number of [place] in the Last Year: Not on file    Unstable Housing in the Last Year: Not on file       Family History:    Family History   Problem Relation Age of Onset    Cancer Mother     Heart Disease Mother  High Blood Pressure Mother     High Cholesterol Mother     Cancer Father         brain    Depression Father     Heart Disease Father     High Cholesterol Father     Mental Illness Father     Cancer Sister     Heart Attack Sister     Heart Disease Maternal Uncle     High Cholesterol Maternal Uncle     Diabetes Maternal Grandmother     Heart Disease Maternal Grandmother     High Cholesterol Maternal Grandmother     Early Death Maternal Grandfather     Heart Disease Maternal Grandfather     High Cholesterol Maternal Grandfather     Stroke Maternal Grandfather     Heart Disease Paternal Grandmother     High Cholesterol Paternal Grandmother     Heart Disease Paternal Grandfather     High Cholesterol Paternal Grandfather        REVIEW OF SYSTEMS:  Constitutional: negative  Eyes: negative  Respiratory: negative  Cardiovascular: negative  Gastrointestinal: negative  Genitourinary: see HPI  Musculoskeletal: negative  Skin: negative   Neurological: negative  Hematological/Lymphatic: negative  Psychological: negative    Physical Exam:      Patient Vitals for the past 24 hrs:   BP Temp Temp src Pulse Resp SpO2   02/10/22 1326 (!) 140/75 98.4 °F (36.9 °C) Tympanic 92 20 96 %     Constitutional: Patient in no acute distress; Neuro: alert and oriented to person place and time. Psych: Mood and affect normal.  Skin: Normal  Lungs: Respiratory effort normal, CTA  Cardiovascular:  Normal peripheral pulses; no murmur  Abdomen: Soft, non-tender, non-distended with no CVA, flank pain, hepatosplenomegaly or hernia. Kidneys normal.  Bladder non-tender and not distended. LABS:   No results for input(s): WBC, HGB, HCT, MCV, PLT in the last 72 hours. No results for input(s): NA, K, CL, CO2, PHOS, BUN, CREATININE, CA in the last 72 hours.   No results found for: PSA        Urinalysis: No results for input(s): COLORU, PHUR, LABCAST, WBCUA, RBCUA, MUCUS, TRICHOMONAS, YEAST, BACTERIA, CLARITYU, SPECGRAV, LEUKOCYTESUR, UROBILINOGEN, Arlester Casie in the last 72 hours.     Invalid input(s): NITRATE, GLUCOSEUKETONESUAMORPHOUS     -----------------------------------------------------------------      Assessment and Plan   Impression:    Patient Active Problem List   Diagnosis    GERD (gastroesophageal reflux disease)    Colon polyps    Chest pain, atypical    Gastroenteritis    Pneumonia    Shoulder pain    History of pulmonary embolism    COPD (chronic obstructive pulmonary disease) ([address]    Hypoglycemia    Anticoagulated on Coumadin    Bipolar disorder ([address]    Cannabis abuse    Cocaine abuse ([address]    Alcohol dependence in remission ([address]    Cholecystitis with cholelithiasis    GI bleed    Erosive esophagitis    Hypokalemia    HTN (hypertension), benign    Bipolar 1 disorder (HCC)    Chronic pain    Hiatal hernia    Chest pain    Vomiting    Erosive gastritis    H pylori ulcer    Hematemesis    History of Helicobacter pylori infection    Intractable abdominal pain    Intractable vomiting with nausea    Epigastric abdominal pain    Acute blood loss anemia    Chronic chest pain    Hyponatremia    Metabolic acidosis    Essential hypertension    COPD with acute exacerbation (HCC)    Hypothyroidism    History of DVT (deep vein thrombosis)    Depression    Tobacco abuse    Hematemesis with nausea    Hypoxia    Pleural effusion, bilateral    Suicidal ideation    Bipolar disorder, in partial remission, most recent episode depressed ([address]    Bipolar II disorder ([address]    Diastolic dysfunction    Angina, class II ([address]    Unstable angina ([address]    Dyslipidemia    Electrolyte abnormality    COPD exacerbation (HCC)    Acute respiratory insufficiency    Chronic diastolic heart failure (HCC)    Tobacco use disorder    Nasal septal deviation    Hypertrophy of left inferior nasal turbinate    Rhinogenic headache    Asymmetrical sensorineural hearing loss    Tinnitus, left ear    Psychosis ([address]    Substance-induced psychotic disorder (HCC)    Bipolar 1 disorder, depressed ([address]    Polysubstance abuse ([address]    Major depressive disorder, recurrent ([address]    Stroke-like symptom    Right arm weakness    Delirium    Paresthesias    Depression, major, recurrent ([address]    Depression with suicidal ideation    Amnesia    Bipolar disorder, current episode mixed, moderate (HCC)    Tlyuhvphk-Nxddveo-Affmyp disease    Carpal tunnel syndrome of left wrist    Change of, skin texture    Chronic midline low back pain without sciatica    Coronary vasospasm (HCC)    Derangement of knee    Disorder associated with Helicobacter species    Disturbance of skin sensation    Encounter for surgical follow-up care    Endometriosis    Environmental and seasonal allergies    Glaucoma suspect    History of arterial ischemic stroke    Mixed hyperlipidemia    Large liver    Lesion of ulnar nerve    Nasal congestion    Nicotine dependence    Pancreatic insufficiency    Primary osteoarthritis involving multiple joints    Sciatica    Sprain of right foot    Preoperative state    Type 2 diabetes mellitus without complication, without long-term current use of insulin (HCC)    Urinary incontinence, overflow    Elevated lactic acid level    Acute renal failure with acute cortical necrosis (HCC)    Pyelonephritis of left kidney    Pyelonephritis    Septicemia ([address]       Plan:   Risks: I discussed all the risks, benefits, alternatives and possible complications or surgery as well as expectations and post-op recovery.       Consent obtained; Cysto Botox 100u, MAC in OR today    Kathy Golden M.D, MD  2:04 PM 2/10/2022

## 2022-02-10 NOTE — PROGRESS NOTES
Pt admitted to [facility] room 19 and oriented to unit. SCD sleeves applied. Nares swabbed. Pt verbalized permission for first name, last initial and physicians name on white board. SDS board and discharge criteria explained, pt and family verbalized understanding. Pt denies thoughts of harming self or others. Call light in reach. Family at the bedside.

## 2022-02-11 NOTE — ANESTHESIA POSTPROCEDURE EVALUATION
Department of Anesthesiology  Postprocedure Note    Patient: Anjum Thomas  MRN: [id number]  YOB: 1955  Date of evaluation: 2/11/2022  Time:  10:02 AM     Procedure Summary     Date: 02/10/22 Room / Location: [facility] / Maura Villalba    Anesthesia Start: 3446 Anesthesia Stop: 2919    Procedure: CYSTOSCOPY URETHRAL IMPLANT INJECTION (N/A Bladder) Diagnosis: (URINARY INCONTINENCE)    Surgeons: Judy Galan MD Responsible Provider: Mario Nicholas MD    Anesthesia Type: MAC ASA Status: 4          Anesthesia Type: MAC    Merline Phase I: Merline Score: 10    Merline Phase II: Merline Score: 10    Last vitals: Reviewed and per EMR flowsheets.        Anesthesia Post Evaluation    Patient location during evaluation: bedside  Patient participation: complete - patient participated  Level of consciousness: awake  Airway patency: patent  Nausea & Vomiting: no vomiting and no nausea  Complications: no  Cardiovascular status: hemodynamically stable  Respiratory status: acceptable  Hydration status: stable

## 2022-02-13 NOTE — OP NOTE
Mari Farley  1955  [id number]    DATE:  2/10/2022  SURGEON:    Dr. Calros Bridges M.D, MD WHITE Preoperative diagnosis: overactive bladder  Postoperative diagnosis: Same  Procedure: Cystoscopy Botox injection (100 units)  Anesthesia: MAC  Specimen: none  Drains: None  EBL: 0 cc  Follow-up: 4-6 weeks     Indications: Mari Farley is a 61 y.o. female with history of overactive bladder. The patient is here for Cystoscopy Botox injection, and has not had botox before. Risks benefits alternatives goals and possible complications of the procedure were explained to the patient and informed consent was obtained he elected to proceed. Details of procedure: The patient was brought back to the operating room. They were  laid in the supine position and induced under MAC anesthesia. The genitals were prepped and draped in usual sterile surgical fashion after being placed in dorsal lithotomy position. A timeout was taken per protocol with everyone in agreement. Rigid cystoscope was assembled using a 30 degree lens and passed per the urethra. The urethra was normal without any lesions. The bladder was entered with ease and inspected thoroughly and systematically which did not show any lesions or tumors, stone, fistulous tracts, diverticula. Both ureteral orifices were normal with clear efflux of urine. At this time we mixed 100 units of botox, and injected the solution into the bladder wall with 0.5 ml increments at each site along the posterior bladder wall. Hemostasis was persistent. The trigone and ureteral orifices were avoided. The patient tolerated the procedure well. The scope was removed intact and without any issues. This concluded the case. The patient was taken to recovery period and discharged home in stable condition. Plan:   They will be asked to void before discharge  Follow up in 4-6 weeks

## 2022-02-19 ENCOUNTER — APPOINTMENT (OUTPATIENT)
Dept: GENERAL RADIOLOGY | Age: 67
End: 2022-02-19
Payer: COMMERCIAL

## 2022-02-19 ENCOUNTER — HOSPITAL ENCOUNTER (EMERGENCY)
Age: 67
Discharge: HOME | End: 2022-02-19
Payer: COMMERCIAL

## 2022-02-19 VITALS
TEMPERATURE: 97.7 F | WEIGHT: 152 LBS | DIASTOLIC BLOOD PRESSURE: 78 MMHG | RESPIRATION RATE: 17 BRPM | SYSTOLIC BLOOD PRESSURE: 146 MMHG | BODY MASS INDEX: 25.95 KG/M2 | OXYGEN SATURATION: 99 % | HEART RATE: 74 BPM | HEIGHT: 64 IN

## 2022-02-19 DIAGNOSIS — S30.0XXA CONTUSION OF LOWER BACK, INITIAL ENCOUNTER: Primary | ICD-10-CM

## 2022-02-19 DIAGNOSIS — S80.01XA CONTUSION OF RIGHT KNEE, INITIAL ENCOUNTER: ICD-10-CM

## 2022-02-19 DIAGNOSIS — S80.02XA CONTUSION OF LEFT KNEE, INITIAL ENCOUNTER: ICD-10-CM

## 2022-02-19 PROCEDURE — 6360000002 HC RX W HCPCS: Performed by: PHYSICIAN ASSISTANT

## 2022-02-19 PROCEDURE — 73564 X-RAY EXAM KNEE 4 OR MORE: CPT

## 2022-02-19 PROCEDURE — 96372 THER/PROPH/DIAG INJ SC/IM: CPT

## 2022-02-19 PROCEDURE — 6370000000 HC RX 637 (ALT 250 FOR IP): Performed by: PHYSICIAN ASSISTANT

## 2022-02-19 PROCEDURE — 72100 X-RAY EXAM L-S SPINE 2/3 VWS: CPT

## 2022-02-19 PROCEDURE — 99284 EMERGENCY DEPT VISIT MOD MDM: CPT

## 2022-02-19 RX ORDER — HYDROCODONE BITARTRATE AND ACETAMINOPHEN 5; 325 MG/1; MG/1
1 TABLET ORAL EVERY 6 HOURS PRN
Qty: 12 TABLET | Refills: 0 | Status: SHIPPED | OUTPATIENT
Start: 2022-02-19 | End: 2022-02-22

## 2022-02-19 RX ORDER — LIDOCAINE HYDROCHLORIDE 20 MG/ML
SOLUTION OROPHARYNGEAL
Status: DISCONTINUED
Start: 2022-02-19 | End: 2022-02-19 | Stop reason: HOSPADM

## 2022-02-19 RX ORDER — MORPHINE SULFATE 4 MG/ML
4 INJECTION, SOLUTION INTRAMUSCULAR; INTRAVENOUS ONCE
Status: COMPLETED | OUTPATIENT
Start: 2022-02-19 | End: 2022-02-19

## 2022-02-19 RX ORDER — ONDANSETRON 4 MG/1
4 TABLET, ORALLY DISINTEGRATING ORAL ONCE
Status: COMPLETED | OUTPATIENT
Start: 2022-02-19 | End: 2022-02-19

## 2022-02-19 RX ORDER — MAGNESIUM HYDROXIDE/ALUMINUM HYDROXICE/SIMETHICONE 120; 1200; 1200 MG/30ML; MG/30ML; MG/30ML
SUSPENSION ORAL
Status: DISCONTINUED
Start: 2022-02-19 | End: 2022-02-19 | Stop reason: HOSPADM

## 2022-02-19 RX ADMIN — LIDOCAINE HYDROCHLORIDE: 20 SOLUTION ORAL; TOPICAL at 16:40

## 2022-02-19 RX ADMIN — MORPHINE SULFATE 4 MG: 4 INJECTION, SOLUTION INTRAMUSCULAR; INTRAVENOUS at 14:54

## 2022-02-19 RX ADMIN — ONDANSETRON 4 MG: 4 TABLET, ORALLY DISINTEGRATING ORAL at 14:54

## 2022-02-19 ASSESSMENT — PAIN SCALES - GENERAL
PAINLEVEL_OUTOF10: 10

## 2022-02-19 ASSESSMENT — ENCOUNTER SYMPTOMS
COLOR CHANGE: 0
NAUSEA: 0
SHORTNESS OF BREATH: 0
DIARRHEA: 0
ABDOMINAL PAIN: 0
EYE DISCHARGE: 0
EYE PAIN: 0
WHEEZING: 0
BACK PAIN: 1
COUGH: 0
RHINORRHEA: 0
VOMITING: 0
EYE ITCHING: 0
SORE THROAT: 0

## 2022-02-19 ASSESSMENT — PAIN DESCRIPTION - LOCATION: LOCATION: BACK;KNEE

## 2022-02-19 ASSESSMENT — PAIN DESCRIPTION - ORIENTATION: ORIENTATION: LOWER

## 2022-02-19 ASSESSMENT — PAIN - FUNCTIONAL ASSESSMENT: PAIN_FUNCTIONAL_ASSESSMENT: 0-10

## 2022-02-19 ASSESSMENT — PAIN DESCRIPTION - PAIN TYPE: TYPE: ACUTE PAIN

## 2022-02-19 NOTE — ED PROVIDER NOTES
[facility] 65 22 COMPLAINT       Chief Complaint   Patient presents with    Knee Pain    Back Pain       Nurses Notes reviewed and I agree except as notedin the HPI. HISTORY OF PRESENT ILLNESS    Annetta Torres is a 61 y.o. female who presents states she slipped and fell on the ice yesterday. Given her knees. She had no loss consciousness. She did not hit her head. She complains of bilateral knee and low back pain. She denies any abdominal pain. She is on Plavix. She denies any other symptoms. REVIEW OF SYSTEMS     Review of Systems   Constitutional: Negative for activity change, appetite change, chills and fever. HENT: Negative for congestion, ear pain, rhinorrhea and sore throat. Eyes: Negative for pain, discharge and itching. Respiratory: Negative for cough, shortness of breath and wheezing. Cardiovascular: Negative for chest pain. Gastrointestinal: Negative for abdominal pain, diarrhea, nausea and vomiting. Genitourinary: Negative for difficulty urinating and dysuria. Musculoskeletal: Positive for back pain. Negative for arthralgias and myalgias. Bilateral knee pain   Skin: Negative for color change and rash. Neurological: Negative for dizziness, seizures, light-headedness and headaches. Psychiatric/Behavioral: Negative for agitation, confusion, self-injury and suicidal ideas. All other systems reviewed and are negative.        PAST MEDICAL HISTORY    has a past medical history of Acute renal failure with acute cortical necrosis ([address] Allergic rhinitis, Arthritis, Bipolar 1 disorder ([address] Blood circulation, collateral, Cancer ([address] Colon polyps, COPD (chronic obstructive pulmonary disease) (HCC), Coronary vasospasm (HCC), Depression, Diverticulitis of colon, GERD (gastroesophageal reflux disease), Hiatal hernia, History of arterial ischemic stroke, History of colonoscopy, History of kidney stones, Hx of blood clots, Hyperlipidemia, Hypertension, Liver disease, Pneumonia, Suicidal thoughts, Thyroid disease, Vomiting, Wears dentures, and Wears glasses. SURGICAL HISTORY      has a past surgical history that includes Mandible fracture surgery (1984); shoulder surgery (2014); Colonoscopy (2011); Dilatation, esophagus; Elbow surgery (Right, 10/7/2004); Rotator cuff repair (2004, 2014); skin biopsy (2006); Cholecystectomy, laparoscopic (6/12/15); Elbow surgery (Bilateral, 2016); Carpal tunnel release (Bilateral, 2016); Hysterectomy (1998); Cardiac surgery; cystoscopy w biopsy of bladder (N/A, 7/13/2020); Stimulator Surgery (N/A, 11/4/2020); Stimulator Surgery (N/A, 11/23/2020); Abdomen surgery; and Cystoscopy (N/A, 2/10/2022). CURRENT MEDICATIONS       Discharge Medication List as of 2/19/2022  4:45 PM      CONTINUE these medications which have NOT CHANGED    Details   phenazopyridine (PYRIDIUM) 200 MG tablet Take 1 tablet by mouth 3 times daily as needed for Pain, Disp-9 tablet, R-0Normal      ibuprofen (ADVIL;MOTRIN) 600 MG tablet Take 600 mg by mouth every 6 hours as needed for PainHistorical Med      !! QUEtiapine (SEROQUEL) 300 MG tablet Take 300 mg by mouth nightly 2 tabHistorical Med      carvedilol (COREG) 3.125 MG tablet TAKE 1 TABLET BY MOUTH TWICE DAILYHistorical Med      famotidine (PEPCID) 20 MG tablet Take 20 mg by mouth dailyHistorical Med      aspirin 81 MG EC tablet Take 1 tablet by mouth daily, Disp-30 tablet, R-3Normal      nitroGLYCERIN (NITROSTAT) 0.4 MG SL tablet up to max of 3 total doses.  If no relief after 1 dose, call 911., Disp-25 tablet, R-3Normal      hydrOXYzine (VISTARIL) 50 MG capsule Take 1 capsule by mouth 3 times daily as needed for Itching or Anxiety, Disp-90 capsule, R-0Normal      topiramate (TOPAMAX) 50 MG tablet Take 1 tablet by mouth 2 times daily, Disp-60 tablet, R-3Normal      sucralfate (CARAFATE) 1 GM tablet Take 1 tablet by mouth 4 times daily, Disp-120 tablet, R-3Normal !! QUEtiapine (SEROQUEL) 200 MG tablet Take 200 mg by mouth daily PMHistorical Med      clopidogrel (PLAVIX) 75 MG tablet Take 75 mg by mouth dailyHistorical Med      albuterol (PROVENTIL) (2.5 MG/3ML) 0.083% nebulizer solution Take 3 mLs by nebulization every 6 hours as needed for Wheezing, Disp-120 each, R-0Normal      Roflumilast (DALIRESP) 500 MCG tablet Take 500 mcg by mouth dailyHistorical Med      Tiotropium Bromide Monohydrate (SPIRIVA HANDIHALER IN) Inhale 2 puffs into the lungs dailyHistorical Med      acetaminophen (MAPAP) 325 MG tablet Take by mouth every 6 hours as needed for Pain 2 tabHistorical Med      potassium chloride (KLOR-CON M) 20 MEQ extended release tablet Take by mouth daily 2 tabHistorical Med      levothyroxine (SYNTHROID) 75 MCG tablet Take 1 tablet by mouth daily everyday except  take 150 mcg., Disp-30 tablet, R-0NO PRINT      fluticasone (FLONASE) 50 MCG/ACT nasal spray 1 spray by Each Nostril route 2 times daily, Disp-1 Bottle, R-0NO PRINT      ferrous sulfate 325 (65 Fe) MG tablet Take 1 tablet by mouth 2 times daily, Disp-30 tablet, B-[id number] PRINT      folic acid (FOLVITE) 1 MG tablet Take 1 mg by mouth dailyHistorical Med      ARIPiprazole (ABILIFY) 2 MG tablet Take 2 mg by mouth dailyHistorical Med      atorvastatin (LIPITOR) 40 MG tablet Take 40 mg by mouth nightly Historical Med      escitalopram (LEXAPRO) 20 MG tablet Take 30 mg by mouth daily Historical Med      fenofibrate 160 MG tablet Take 160 mg by mouth daily Historical Med      traZODone (DESYREL) 100 MG tablet Take 2 tablets by mouth nightly, Disp-30 tablet, R-0Normal       !! - Potential duplicate medications found. Please discuss with provider. ALLERGIES     is allergic to seasonal.    HISTORY     She indicated that her mother is . She indicated that her father is . She indicated that her sister is alive. She indicated that her brother is alive.  She indicated that the status of her maternal grandmother is unknown. She indicated that the status of her maternal grandfather is unknown. She indicated that the status of her paternal grandmother is unknown. She indicated that the status of her paternal grandfather is unknown. She indicated that the status of her maternal uncle is unknown.   family history includes Cancer in her father, mother, and sister; Depression in her father; Diabetes in her maternal grandmother; Early Death in her maternal grandfather; Heart Attack in her sister; Heart Disease in her father, maternal grandfather, maternal grandmother, maternal uncle, mother, paternal grandfather, and paternal grandmother; High Blood Pressure in her mother; High Cholesterol in her father, maternal grandfather, maternal grandmother, maternal uncle, mother, paternal grandfather, and paternal grandmother; Mental Illness in her father; Stroke in her maternal grandfather. SOCIALHISTORY      reports that she has been smoking cigarettes. She started smoking about 44 years ago. She has a 15.00 pack-year smoking history. She has never used smokeless tobacco. She reports previous drug use. Frequency: 1.00 time per week. Drug: Cocaine. She reports that she does not drink alcohol. PHYSICAL EXAM     INITIAL VITALS:  height is 5' 4\" (1.626 m) and weight is 152 lb (68.9 kg). Her oral temperature is 97.7 °F (36.5 °C). Her blood pressure is 146/78 (abnormal) and her pulse is 74. Her respiration is 17 and oxygen saturation is 99%. Physical Exam  Vitals and nursing note reviewed. Constitutional:       Comments: Well Developed Well Nourished Appearing     HENT:      Head: Normocephalic and atraumatic. Eyes:      Pupils: Pupils are equal, round, and reactive to light. Cardiovascular:      Rate and Rhythm: Normal rate and regular rhythm. Heart sounds: Normal heart sounds. Pulmonary:      Effort: Pulmonary effort is normal. No respiratory distress. Breath sounds: Normal breath sounds.  No wheezing. Abdominal:      General: Bowel sounds are normal. There is no distension. Palpations: Abdomen is soft. Genitourinary:     Vagina: No vaginal discharge. Musculoskeletal:      Cervical back: Normal range of motion and neck supple. Comments: Tender anterior knees bilaterally. Milligrams stability no effusion. Abrasion on the left. She does have some tenderness in her lumbar spine and the paraspinous muscles no lumbar midline tenderness. Good strength and sensation lower extremities         DIFFERENTIAL DIAGNOSIS:   Fall multiple contusion. Rule out fracture    DIAGNOSTIC RESULTS     EKG: All EKG's are interpreted by the Emergency Department Physician who either signs or Co-signs this chart in the absence of a cardiologist.      RADIOLOGY: non-plain film images(s) such as CT, Ultrasound and MRI are read by the radiologist.  XR KNEE LEFT (MIN 4 VIEWS)   Final Result   1. No acute fracture, malalignment or effusion. **This report has been created using voice recognition software. It may contain minor errors which are inherent in voice recognition technology. **      Final report electronically signed by Dr. Dominik Parada on 2/19/2022 3:35 PM      XR LUMBAR SPINE (2-3 VIEWS)   Final Result   1. No acute fracture or malalignment. 2. Mild lower lumbar facet arthrosis is seen at L5-S1. **This report has been created using voice recognition software. It may contain minor errors which are inherent in voice recognition technology. **      Final report electronically signed by Dr. Dominik Parada on 2/19/2022 3:37 PM      XR KNEE RIGHT (MIN 4 VIEWS)   Final Result   1. No acute fracture or malalignment. No effusion. **This report has been created using voice recognition software. It may contain minor errors which are inherent in voice recognition technology. **      Final report electronically signed by Dr. Dominik Parada on 2/19/2022 3:38 PM            LABS:   Labs Reviewed - No data to display    EMERGENCY DEPARTMENT COURSE:   :    Vitals:    02/19/22 1419 02/19/22 1507 02/19/22 1639   BP: 126/85 136/75 (!) 146/78   Pulse: 74 72 74   Resp: 17 17 17   Temp: 97.7 °F (36.5 °C)     TempSrc: Oral     SpO2: 98% 99% 99%   Weight: 152 lb (68.9 kg)     Height: 5' 4\" (1.626 m)       Patient was seen history physical exam was performed. Patient given morphine and Zofran. Patient was given a GI cocktail also because she got nauseated after the morphine and Zofran. See disposition below    CRITICAL CARE:  None    CONSULTS:  None    PROCEDURES:  None    FINAL IMPRESSION      1. Contusion of lower back, initial encounter    2. Contusion of right knee, initial encounter    3. Contusion of left knee, initial encounter          DISPOSITION/PLAN   Discharge    PATIENT REFERRED TO:  Konnie Landau, APRN [facility]  [address]  [phone]    In 2 days        DISCHARGE MEDICATIONS:  Discharge Medication List as of 2/19/2022  4:45 PM      START taking these medications    Details   HYDROcodone-acetaminophen (NORCO) 5-325 MG per tablet Take 1 tablet by mouth every 6 hours as needed for Pain for up to 3 days. Intended supply: 3 days.  Take lowest dose possible to manage pain, Disp-12 tablet, R-0Print             (Please note that portions of this note were completed with a voice recognitionprogram.  Efforts were made to edit the dictations but occasionally words are mis-transcribed.)    JENN Orosco [place]  02/19/22 8216

## 2022-02-19 NOTE — ED NOTES
Pt resting in bed watching television, respirations unlabored.      Maryann Mcguire RN  02/19/22 1640

## 2022-02-19 NOTE — ED NOTES
Presents to ER with complaints of bilateral knee and back pain. Pt states she fell from a standing position on Thursday and had back and knee pain. Pt states yesterday a door hit her left knee and she has had increased pain. PT ambulated from wheelchair to bed independently, tolerated well.      Maryann Mcguire RN  02/19/22 [id number]

## 2022-03-31 ENCOUNTER — OFFICE VISIT (OUTPATIENT)
Dept: UROLOGY | Age: 67
End: 2022-03-31
Payer: COMMERCIAL

## 2022-03-31 ENCOUNTER — TELEPHONE (OUTPATIENT)
Dept: RHEUMATOLOGY | Age: 67
End: 2022-03-31

## 2022-03-31 ENCOUNTER — OFFICE VISIT (OUTPATIENT)
Dept: PULMONOLOGY | Age: 67
End: 2022-03-31
Payer: COMMERCIAL

## 2022-03-31 VITALS
DIASTOLIC BLOOD PRESSURE: 75 MMHG | WEIGHT: 156.4 LBS | OXYGEN SATURATION: 98 % | SYSTOLIC BLOOD PRESSURE: 124 MMHG | BODY MASS INDEX: 26.7 KG/M2 | TEMPERATURE: 97.5 F | HEIGHT: 64 IN | HEART RATE: 71 BPM

## 2022-03-31 VITALS
SYSTOLIC BLOOD PRESSURE: 124 MMHG | HEIGHT: 64 IN | DIASTOLIC BLOOD PRESSURE: 72 MMHG | WEIGHT: 156 LBS | BODY MASS INDEX: 26.63 KG/M2

## 2022-03-31 DIAGNOSIS — N32.81 OAB (OVERACTIVE BLADDER): ICD-10-CM

## 2022-03-31 DIAGNOSIS — J41.1 MUCOPURULENT CHRONIC BRONCHITIS (HCC): Primary | ICD-10-CM

## 2022-03-31 DIAGNOSIS — F17.200 SMOKER: ICD-10-CM

## 2022-03-31 DIAGNOSIS — N39.3 STRESS INCONTINENCE IN FEMALE: ICD-10-CM

## 2022-03-31 DIAGNOSIS — J44.9 CHRONIC OBSTRUCTIVE PULMONARY DISEASE, UNSPECIFIED COPD TYPE (HCC): ICD-10-CM

## 2022-03-31 DIAGNOSIS — N20.0 NEPHROLITHIASIS: ICD-10-CM

## 2022-03-31 DIAGNOSIS — R35.0 URINARY FREQUENCY: ICD-10-CM

## 2022-03-31 DIAGNOSIS — J41.1 MUCOPURULENT CHRONIC BRONCHITIS (HCC): ICD-10-CM

## 2022-03-31 DIAGNOSIS — N39.41 URGE INCONTINENCE OF URINE: ICD-10-CM

## 2022-03-31 DIAGNOSIS — N39.490 URINARY INCONTINENCE, OVERFLOW: Primary | ICD-10-CM

## 2022-03-31 LAB
BILIRUBIN URINE: NEGATIVE
BLOOD URINE, POC: ABNORMAL
CHARACTER, URINE: CLEAR
COLOR, URINE: YELLOW
GLUCOSE URINE: NEGATIVE MG/DL
KETONES, URINE: NEGATIVE
LEUKOCYTE CLUMPS, URINE: ABNORMAL
NITRITE, URINE: NEGATIVE
PH, URINE: 7 (ref 5–9)
POST VOID RESIDUAL (PVR): 61 ML
PROTEIN, URINE: NEGATIVE MG/DL
SPECIFIC GRAVITY, URINE: 1.02 (ref 1–1.03)
UROBILINOGEN, URINE: 0.2 EU/DL (ref 0–1)

## 2022-03-31 PROCEDURE — 81003 URINALYSIS AUTO W/O SCOPE: CPT | Performed by: UROLOGY

## 2022-03-31 PROCEDURE — 4004F PT TOBACCO SCREEN RCVD TLK: CPT | Performed by: INTERNAL MEDICINE

## 2022-03-31 PROCEDURE — G8484 FLU IMMUNIZE NO ADMIN: HCPCS | Performed by: INTERNAL MEDICINE

## 2022-03-31 PROCEDURE — 99214 OFFICE O/P EST MOD 30 MIN: CPT | Performed by: UROLOGY

## 2022-03-31 PROCEDURE — 4004F PT TOBACCO SCREEN RCVD TLK: CPT | Performed by: UROLOGY

## 2022-03-31 PROCEDURE — 51798 US URINE CAPACITY MEASURE: CPT | Performed by: UROLOGY

## 2022-03-31 PROCEDURE — G8419 CALC BMI OUT NRM PARAM NOF/U: HCPCS | Performed by: UROLOGY

## 2022-03-31 PROCEDURE — G8484 FLU IMMUNIZE NO ADMIN: HCPCS | Performed by: UROLOGY

## 2022-03-31 PROCEDURE — G8427 DOCREV CUR MEDS BY ELIG CLIN: HCPCS | Performed by: INTERNAL MEDICINE

## 2022-03-31 PROCEDURE — 3017F COLORECTAL CA SCREEN DOC REV: CPT | Performed by: INTERNAL MEDICINE

## 2022-03-31 PROCEDURE — G8427 DOCREV CUR MEDS BY ELIG CLIN: HCPCS | Performed by: UROLOGY

## 2022-03-31 PROCEDURE — 3017F COLORECTAL CA SCREEN DOC REV: CPT | Performed by: UROLOGY

## 2022-03-31 PROCEDURE — 99215 OFFICE O/P EST HI 40 MIN: CPT | Performed by: INTERNAL MEDICINE

## 2022-03-31 PROCEDURE — G8419 CALC BMI OUT NRM PARAM NOF/U: HCPCS | Performed by: INTERNAL MEDICINE

## 2022-03-31 PROCEDURE — 3023F SPIROM DOC REV: CPT | Performed by: INTERNAL MEDICINE

## 2022-03-31 RX ORDER — PREDNISONE 50 MG/1
50 TABLET ORAL DAILY
Qty: 7 TABLET | Refills: 0 | Status: SHIPPED | OUTPATIENT
Start: 2022-03-31 | End: 2022-04-05

## 2022-03-31 RX ORDER — DOXYCYCLINE HYCLATE 100 MG
100 TABLET ORAL 2 TIMES DAILY
Qty: 14 TABLET | Refills: 0 | Status: SHIPPED | OUTPATIENT
Start: 2022-03-31 | End: 2022-04-07

## 2022-03-31 RX ORDER — FLUTICASONE FUROATE, UMECLIDINIUM BROMIDE AND VILANTEROL TRIFENATATE 100; 62.5; 25 UG/1; UG/1; UG/1
1 POWDER RESPIRATORY (INHALATION) DAILY
Qty: 60 EACH | Refills: 5 | Status: ON HOLD | OUTPATIENT
Start: 2022-03-31

## 2022-03-31 ASSESSMENT — ENCOUNTER SYMPTOMS
VOICE CHANGE: 1
COUGH: 1
CHEST TIGHTNESS: 1
WHEEZING: 1
SHORTNESS OF BREATH: 1

## 2022-03-31 NOTE — TELEPHONE ENCOUNTER
Dr. Abbey Rojo would you like this patient to take prednisone for 5 days or 7 days? The one you sent in says to take for five days but you sent in 7 tabs. Thanks!

## 2022-03-31 NOTE — PATIENT INSTRUCTIONS
Patient Education        Stopping Smoking: Care Instructions  Your Care Instructions     Cigarette smokers crave the nicotine in cigarettes. Giving it up is much harder than simply changing a habit. Your body has to stop craving the nicotine. It is hard to quit, but you can do it. There are many tools that people use to quitsmoking. You may find that combining tools works best for you. There are several steps to quitting. First you get ready to quit. Then you get support to help you. After that, you learn new skills and behaviors to become anonsmoker. For many people, a necessary step is getting and using medicine. Your doctor will help you set up the plan that best meets your needs. You may want to attend a smoking cessation program to help you quit smoking. When you choose a program, look for one that has proven success. Ask your doctor for ideas. You will greatly increase your chances of success if you take medicineas well as get counseling or join a cessation program.  Some of the changes you feel when you first quit tobacco are uncomfortable. Your body will miss the nicotine at first, and you may feel short-tempered and grumpy. You may have trouble sleeping or concentrating. Medicine can help you deal with these symptoms. You may struggle with changing your smoking habits and rituals. The last step is the tricky one: Be prepared for the smoking urge to continue for a time. This is a lot to deal with, but keep at it. You willfeel better. Follow-up care is a key part of your treatment and safety. Be sure to make and go to all appointments, and call your doctor if you are having problems. It's also a good idea to know your test results and keep alist of the medicines you take. How can you care for yourself at home?  Ask your family, friends, and coworkers for support. You have a better chance of quitting if you have help and support.    Join a support group, such as Nicotine Anonymous, for people who are trying to quit smoking.  Consider signing up for a smoking cessation program, such as the American Lung Association's Freedom from Smoking program.   Get text messaging support. Go to the website at www.smokefree. gov to sign up for the [facility] program.   Set a quit date. Pick your date carefully so that it is not right in the middle of a big deadline or stressful time. Once you quit, do not even take a puff. Get rid of all ashtrays and lighters after your last cigarette. Clean your house and your clothes so that they do not smell of smoke.  Learn how to be a nonsmoker. Think about ways you can avoid those things that make you reach for a cigarette. ? Avoid situations that put you at greatest risk for smoking. For some people, it is hard to have a drink with friends without smoking. For others, they might skip a coffee break with coworkers who smoke. ? Change your daily routine. Take a different route to work or eat a meal in a different place.  Cut down on stress. Calm yourself or release tension by doing an activity you enjoy, such as reading a book, taking a hot bath, or gardening.  Talk to your doctor or pharmacist about nicotine replacement therapy, which replaces the nicotine in your body. You still get nicotine but you do not use tobacco. Nicotine replacement products help you slowly reduce the amount of nicotine you need. These products come in several forms, many of them available over-the-counter:  ? Nicotine patches  ? Nicotine gum and lozenges  ? Nicotine inhaler   Ask your doctor about bupropion (Wellbutrin) or varenicline (Chantix), which are prescription medicines. They do not contain nicotine. They help you by reducing withdrawal symptoms, such as stress and anxiety.  Some people find hypnosis, acupuncture, and massage helpful for ending the smoking habit.  Eat a healthy diet and get regular exercise.  Having healthy habits will help your body move past its craving for nicotine.  Be prepared to keep trying. Most people are not successful the first few times they try to quit. Do not get mad at yourself if you smoke again. Make a list of things you learned and think about when you want to try again, such as next week, next month, or next year. Where can you learn more? Go to https://Toodalupejose luisewena.[employer]chanto. org and sign in to your [employer] account. Enter R049 in the "[employer] box to learn more about \"Stopping Smoking: Care Instructions. \"     If you do not have an account, please click on the \"Sign Up Now\" link. Current as of: October 28, 2021               Content Version: 13.2  © 2006-2022 [employer]. Care instructions adapted under license by [facility] ([facility]). If you have questions about a medical condition or this instruction, always ask your healthcare professional. [address] any warranty or liability for your use of this information.

## 2022-03-31 NOTE — PROGRESS NOTES
Alyssia Cotter MD  Urology Clinic office visit    Patient:  Connie Shin  YOB: 1955  Date: 3/31/2022    HISTORY OF PRESENT ILLNESS:   The patient is a 61 y.o. female who presents today for follow-up for the following problem(s):      1. Urinary incontinence, overflow    2. OAB (overactive bladder)    3. Stress incontinence in female    4. Nephrolithiasis    5. Urge incontinence of urine    6. Urinary frequency         Overall the problem(s) : are persistent  Associated Symptoms: No dysuria, gross hematuria. Pain Severity:      Summary of old records: 72-year-old white female returns today after undergoing permanent sacral nerve stimulator placement on 11/23/2020. Had a fall post operatively  Additional History:   Does not think device is working  She reports urgency, urge incontinence  3 pads a day  Also JEFF, mixed    [employer] interrogation  Patient says device has been mostly off. Voiding about 8 times a day with leaks about 1-2 times a day. Most all programming options stimulated the right toe for the patient. If amp is strong enough, stimulation of vaginal area is achieved, but then right toe is too stimulated. Left program on [employer] with a lower pulse width      10/8/2021 cysto and PE  Vagina: normal appearing vagina with normal color and discharge, no lesions  No much prolapse  Rectal vault with stool  Rectocele noted  Urethra: normal appearing urethra with no masses, tenderness or lesions  No JEFF noted with cough  Bladder: No tumors or CIS noted. No bladder diverticulum. moderate trabeculation noted.     Cystoscopy [id number] 1 month ago 2/2022  Had troubles with starting stream and having to push  PVR today 61 cc  Still reports frequency  Reports 20% improvement  Some JEFF    Imaging Reviewed during this Office Visit:   (results were independently reviewed by physician and radiology report verified)    Urinalysis today:  Results for POC orders placed in visit on 03/31/22   POCT Urinalysis No Micro (Auto)   Result Value Ref Range    Glucose, Ur Negative NEGATIVE mg/dl    Bilirubin Urine Negative     Ketones, Urine Negative NEGATIVE    Specific Gravity, Urine 1.020 1.002 - 1.030    Blood, UA POC Trace-lysed NEGATIVE    pH, Urine 7.00 5.0 - 9.0    Protein, Urine Negative NEGATIVE mg/dl    Urobilinogen, Urine 0.20 0.0 - 1.0 eu/dl    Nitrite, Urine Negative NEGATIVE    Leukocyte Clumps, Urine Moderate (A) NEGATIVE    Color, Urine Yellow [id number]    Character, Urine Clear [id number].[id number]   poct post void residual   Result Value Ref Range    post void residual 61 ml       Last BUN and creatinine:  Lab Results   Component Value Date    BUN 12 01/11/2022     Lab Results   Component Value Date    CREATININE 0.9 01/11/2022       PAST MEDICAL, FAMILY AND SOCIAL HISTORY UPDATE:  Past Medical History:   Diagnosis Date    Acute renal failure with acute cortical necrosis (HCC) 12/21/2019    Allergic rhinitis     Arthritis     spine    [facility]     Blood circulation, collateral     Cancer ([facility] 2011    cancerous polyps removed - Dr. Radha Marie Colon polyps     COPD (chronic obstructive pulmonary disease) ([facility] 7/24/2014    Coronary vasospasm (HCC) 8/17/2019    Depression     Diverticulitis of colon     GERD (gastroesophageal reflux disease)     Hiatal hernia     History of arterial ischemic stroke 7/20/2019    History of colonoscopy 2002    History of kidney stones     Hx of blood clots 6/17/2014    PE and collar bone area after shoulder surgery    Hyperlipidemia     Hypertension     Liver disease     enlarged liver - damaged with alcohol in past but no cirrhosis per patient    Pneumonia 7/24/2014    Suicidal thoughts     [facility]    Thyroid disease     Vomiting     Wears dentures     Wears glasses      Past Surgical History:   Procedure Laterality Date    ABDOMEN SURGERY      x4 removed cysts and ovary removed    CARDIAC SURGERY      heart caths, no stents    CARPAL TUNNEL RELEASE Bilateral 2016    CHOLECYSTECTOMY, LAPAROSCOPIC  6/12/15    Dr. Jackeline Hearn N/A 2/10/2022    CYSTOSCOPY URETHRAL IMPLANT INJECTION performed by Ayesha Guan MD at [address] N/A 7/13/2020    CYSTOSCOPY WITH BLADDER BIOPSIES performed by Joanna Tellez MD at [address], [address] Right 10/7/2004    Dr. Deven Andrews Bilateral 2016    decompression   Achilles Flatness    Dr. Criss Child with [facility]    ROTATOR CUFF REPAIR  2004, 2014    right shoulder    SHOULDER SURGERY  2014    right    SKIN BIOPSY  2006    under left eye    STIMULATOR SURGERY N/A 11/4/2020    STAGE 1 INTERSTIM PLACEMENT performed by Joanna Tellez MD at [place] [address] 11/23/2020    PLACEMENT OF STAGE II INTERSTIM performed by Joanna Tellez MD at [facility] History   Problem Relation Age of Onset    Cancer Mother     Heart Disease Mother     High Blood Pressure Mother     High Cholesterol Mother     Cancer Father         brain    Depression Father     Heart Disease Father     High Cholesterol Father     Mental Illness Father     Cancer Sister     Heart Attack Sister     Heart Disease Maternal Uncle     High Cholesterol Maternal Uncle     Diabetes Maternal Grandmother     Heart Disease Maternal Grandmother     High Cholesterol Maternal Grandmother     Early Death Maternal Grandfather     Heart Disease Maternal Grandfather     High Cholesterol Maternal Grandfather     Stroke Maternal Grandfather     Heart Disease Paternal Grandmother     High Cholesterol Paternal Grandmother     Heart Disease Paternal Grandfather     High Cholesterol Paternal Grandfather      No outpatient medications have been marked as taking for the 3/31/22 encounter (Office Visit) with Aeysha Guan MD.       [facility]  Social History     Tobacco Use   Smoking Status Current Every Day Smoker    Packs/day: 0.50    Years: 30.00    Pack years: 15.00    Types: Cigarettes    Start date: 4/22/1977   Smokeless Tobacco Never Used       Social History     Substance and Sexual Activity   Alcohol Use No    Comment: none for 2 years       REVIEW OF SYSTEMS:  Constitutional: negative  Eyes: negative  Respiratory: negative  Cardiovascular: negative  Gastrointestinal: negative  Genitourinary: negative except for what is in HPI  Musculoskeletal: negative  Skin: negative   Neurological: negative  Hematological/Lymphatic: negative  Psychological: negative    Physical Exam:      Vitals:    03/31/22 1402   BP: 124/72     Patient is a 61 y.o. female in no acute distress and alert and oriented to person, place and time. NAD, Alert  Non labored respiration        Assessment and Plan      1. Urinary incontinence, overflow    2. OAB (overactive bladder)    3. Stress incontinence in female    4. Nephrolithiasis    5. Urge incontinence of urine    6. Urinary frequency           Plan:       [employer] interrogation notes noted  OAB still bothersome, still having incontinence  No UTIs  Vesicare Trial improved some, but not at goal. Did not fill the Vesicare BID due to insurance  Rx for laxatives    Only 20% improvement with cystoscopy Botox 100 u injection  Discussed can go up to 200 U    Patient would like to have interstim removed  Will arrange      Will then arrange for botox 200u in future                 Prescriptions Ordered:  No orders of the defined types were placed in this encounter.      Orders Placed:  Orders Placed This Encounter   Procedures    POCT Urinalysis No Micro (Auto)    poct post void residual     Bladder scan            MD Blank Reynolds M.D, MD  [facility]

## 2022-03-31 NOTE — TELEPHONE ENCOUNTER
Idris Half with [facility] Pharmacy called stating the patients prednisone prescription states to be taken for 5 days but written for 7days.  Please advise Deisi/General Pharmacy [phone]

## 2022-03-31 NOTE — PROGRESS NOTES
Subjective:      Patient ID: Nguyen Mcqueen is a 61 y.o. female. CC: COPD    HPI     Brought as a new patient to see me but we have seen Avila Jiménez in the past for COPD. Unfortunately continues smoking 1/2 to 1 ppd and her disease is progressing, c/o chronic bronchitis with \"wet cough\", purulent sputum production, SOB, chest tightness. Started smoking at age 28 y/o average 1 ppd, lives with her sister and a friend all smoke and quitting is not a thing that they discuss. Last seen in pulm by Elbert Garcia CNP 8/2020  Bipolar and uses tobacco to relax  Last screening CT chest 8/2021 RADS 2    Review of Systems   Constitutional: Positive for fatigue. HENT: Positive for voice change. Respiratory: Positive for cough, chest tightness, shortness of breath and wheezing. Endocrine: Negative. Musculoskeletal: Positive for arthralgias. Psychiatric/Behavioral: The patient is nervous/anxious.            All other systems reviewed and are negative    Lung Nodule Screening     [x] Qualifies    [] Does not qualify   [] Declined   [] Completed  Past Medical History:   Diagnosis Date    Acute renal failure with acute cortical necrosis (HCC) 12/21/2019    Allergic rhinitis     Arthritis     spine    Bipolar 1 disorder ([address]     Blood circulation, collateral     Cancer ([address] 2011    cancerous polyps removed - Dr. Eddie Dean Colon polyps     COPD (chronic obstructive pulmonary disease) ([address] 7/24/2014    Coronary vasospasm ([address] 8/17/2019    Depression     Diverticulitis of colon     GERD (gastroesophageal reflux disease)     Hiatal hernia     History of arterial ischemic stroke 7/20/2019    History of colonoscopy 2002    History of kidney stones     Hx of blood clots 6/17/2014    PE and collar bone area after shoulder surgery    Hyperlipidemia     Hypertension     Liver disease     enlarged liver - damaged with alcohol in past but no cirrhosis per patient    Pneumonia 7/24/2014    Suicidal thoughts 2015 admitted to  from [address] Thyroid disease     Vomiting     Wears dentures     Wears glasses      Past Surgical History:   Procedure Laterality Date    ABDOMEN SURGERY      x4 removed cysts and ovary removed    CARDIAC SURGERY      heart caths, no stents    CARPAL TUNNEL RELEASE Bilateral 2016    CHOLECYSTECTOMY, LAPAROSCOPIC  6/12/15    Dr. Nena Simmons N/A 2/10/2022    CYSTOSCOPY URETHRAL IMPLANT INJECTION performed by Omid Blanchard MD at [address] N/A 7/13/2020    CYSTOSCOPY WITH BLADDER BIOPSIES performed by Anette Burger MD at [address], [address] Right 10/7/2004    Dr. Luma Shah Bilateral 2016    decompression   [facility]    ROTATOR CUFF REPAIR  2004, 2014    right shoulder    SHOULDER SURGERY  2014    right    SKIN BIOPSY  2006    under left eye    STIMULATOR SURGERY N/A 11/4/2020    STAGE 1 INTERSTIM PLACEMENT performed by Anette Bugrer MD at [address] N/A 11/23/2020    PLACEMENT OF STAGE II INTERSTIM performed by Anette Burger MD at [address] History     Tobacco Use    Smoking status: Current Every Day Smoker     Packs/day: 0.50     Years: 30.00     Pack years: 15.00     Types: Cigarettes     Start date: 4/22/1977    Smokeless tobacco: Never Used   Vaping Use    Vaping Use: Never used   Substance Use Topics    Alcohol use: No     Comment: none for 2 years    Drug use: Not Currently     Frequency: 1.0 times per week     Types: Cocaine     Comment: m-1      Allergies   Allergen Reactions    Seasonal Other (See Comments)     sneezing      Family History   Problem Relation Age of Onset    Cancer Mother     Heart Disease Mother     High Blood Pressure Mother     High Cholesterol Mother     Cancer Father         brain    Depression Father     Heart Disease Father     High Cholesterol Father     Mental Illness Father     Cancer Sister     Heart Attack Sister     Heart Disease Maternal Uncle     High Cholesterol Maternal Uncle     Diabetes Maternal Grandmother     Heart Disease Maternal Grandmother     High Cholesterol Maternal Grandmother     Early Death Maternal Grandfather     Heart Disease Maternal Grandfather     High Cholesterol Maternal Grandfather     Stroke Maternal Grandfather     Heart Disease Paternal Grandmother     High Cholesterol Paternal Grandmother     Heart Disease Paternal Grandfather     High Cholesterol Paternal Grandfather      Current Outpatient Medications   Medication Sig Dispense Refill    phenazopyridine (PYRIDIUM) 200 MG tablet Take 1 tablet by mouth 3 times daily as needed for Pain 9 tablet 0    ibuprofen (ADVIL;MOTRIN) 600 MG tablet Take 600 mg by mouth every 6 hours as needed for Pain      QUEtiapine (SEROQUEL) 300 MG tablet Take 300 mg by mouth nightly 2 tab      carvedilol (COREG) 3.125 MG tablet TAKE 1 TABLET BY MOUTH TWICE DAILY      famotidine (PEPCID) 20 MG tablet Take 20 mg by mouth daily      aspirin 81 MG EC tablet Take 1 tablet by mouth daily 30 tablet 3    nitroGLYCERIN (NITROSTAT) 0.4 MG SL tablet up to max of 3 total doses.  If no relief after 1 dose, call 911. 25 tablet 3    hydrOXYzine (VISTARIL) 50 MG capsule Take 1 capsule by mouth 3 times daily as needed for Itching or Anxiety 90 capsule 0    topiramate (TOPAMAX) 50 MG tablet Take 1 tablet by mouth 2 times daily 60 tablet 3    sucralfate (CARAFATE) 1 GM tablet Take 1 tablet by mouth 4 times daily 120 tablet 3    QUEtiapine (SEROQUEL) 200 MG tablet Take 200 mg by mouth daily PM      clopidogrel (PLAVIX) 75 MG tablet Take 75 mg by mouth daily      albuterol (PROVENTIL) (2.5 MG/3ML) 0.083% nebulizer solution Take 3 mLs by nebulization every 6 hours as needed for Wheezing 120 each 0    Roflumilast (DALIRESP) 500 MCG tablet Take 500 mcg by mouth daily      Tiotropium Bromide Monohydrate (SPIRIVA HANDIHALER IN) Inhale 2 puffs into the lungs daily      acetaminophen (MAPAP) 325 MG tablet Take by mouth every 6 hours as needed for Pain 2 tab      potassium chloride (KLOR-CON M) 20 MEQ extended release tablet Take by mouth daily 2 tab      levothyroxine (SYNTHROID) 75 MCG tablet Take 1 tablet by mouth daily everyday except Sunday take 150 mcg. 30 tablet 0    fluticasone (FLONASE) 50 MCG/ACT nasal spray 1 spray by Each Nostril route 2 times daily 1 Bottle 0    ferrous sulfate 325 (65 Fe) MG tablet Take 1 tablet by mouth 2 times daily 30 tablet 0    folic acid (FOLVITE) 1 MG tablet Take 1 mg by mouth daily      ARIPiprazole (ABILIFY) 2 MG tablet Take 2 mg by mouth daily      atorvastatin (LIPITOR) 40 MG tablet Take 40 mg by mouth nightly       escitalopram (LEXAPRO) 20 MG tablet Take 30 mg by mouth daily       fenofibrate 160 MG tablet Take 160 mg by mouth daily       traZODone (DESYREL) 100 MG tablet Take 2 tablets by mouth nightly (Patient taking differently: Take 150 mg by mouth nightly 2 tabs prn) 30 tablet 0     No current facility-administered medications for this visit. LABS - none   There were no vitals taken for this visit. Wt Readings from Last 3 Encounters:   02/19/22 152 lb (68.9 kg)   12/14/21 153 lb 12.8 oz (69.8 kg)   11/04/21 152 lb (68.9 kg)     Neck Circumference - 15.75 in; Mallampati Score - 4      Objective:   Physical Exam  Constitutional:       Appearance: She is normal weight. Comments: Older than stated age   HENT:      Head: Atraumatic. Nose: Nose normal.      Mouth/Throat:      Comments: Raspy voice  Cardiovascular:      Rate and Rhythm: Normal rate and regular rhythm. Pulses: Normal pulses. Heart sounds: Normal heart sounds. Pulmonary:      Effort: Pulmonary effort is normal.      Breath sounds: Wheezing and rhonchi present. Abdominal:      Palpations: Abdomen is soft. Musculoskeletal:      Cervical back: Normal range of motion. Skin:     General: Skin is warm. Six Minute Walk Test  Socorro Pimentel 1955    Six minute walk test done in my office today by my medical assistant. Elli's oxygen saturation at rest on room air was 96%. Her oxygen saturation dropped to 97% on room air with exertion after walking 972 feet and within 6 minutes. Resting Dyspnea/Snady score was 3  and 8  upon completion of the walk. Resting heart rate was  68 bpm and 97 bpm upon completing the walk. Assessment:       Diagnosis Orders   1. Mucopurulent chronic bronchitis ([facility]  6 Minute Walk Test    Alpha-1-Antitrypsin    [facility] (Smoking Cessation - Clinical Pharmacy) - [place]    Culture, Respiratory   2. Chronic obstructive pulmonary disease, unspecified COPD type ([facility]  6 Minute Walk Test    Alpha-1-Antitrypsin    [facility] (Smoking Cessation - Clinical Pharmacy) - [facility]    Culture, Respiratory   3. Smoker  6 Minute Walk Test    Alpha-1-Antitrypsin    [facility]) Medication Mgmt (Smoking Cessation - Clinical Pharmacy) - [facility]    Culture, Respiratory           Plan:      Orders Placed This Encounter   Procedures    Culture, Respiratory    Alpha-1-Antitrypsin     Standing Status:   Future     Standing Expiration Date:   3/31/2023   [address] Medication Mgmt (Smoking Cessation - Clinical Pharmacy) - [facility]     Referral Priority:   Routine     Referral Type:    Other     Referral Reason:   Specialty Services Required     Requested Specialty:   Pharmacist     Number of Visits Requested:   1     Expiration Date:   3/31/2024    6 Minute Walk Test     Standing Status:   Future     Standing Expiration Date:   3/31/2023      Orders Placed This Encounter   Medications    predniSONE (DELTASONE) 50 MG tablet     Sig: Take 1 tablet by mouth daily for 5 days     Dispense:  7 tablet     Refill:  0    doxycycline hyclate (VIBRA-TABS) 100 MG tablet     Sig: Take 1 tablet by mouth 2 times daily for 7 days     Dispense:  14 tablet     Refill:  0    fluticasone-umeclidin-vilant (TRELEGY ELLIPTA) 100-62.5-25 MCG/INH AEPB     Sig: Inhale 1 puff into the lungs daily     Dispense:  60 each     Refill:  5      RTC 6 weeks    Patient was counseled on tobacco cessation. Based upon patient's motivation to change her behavior, the following plan was agreed upon: patient will try the following tobacco cessation strategies:  tobacco cessation classes/support groups. She was provided with a list of local tobacco cessation resources. Provider spent 5 minutes counseling patient.

## 2022-04-04 ENCOUNTER — TELEPHONE (OUTPATIENT)
Dept: UROLOGY | Age: 67
End: 2022-04-04

## 2022-04-04 DIAGNOSIS — Z01.818 PRE-OP TESTING: ICD-10-CM

## 2022-04-04 DIAGNOSIS — N39.490 URINARY INCONTINENCE, OVERFLOW: Primary | ICD-10-CM

## 2022-04-04 NOTE — TELEPHONE ENCOUNTER
Patient scheduled for Removal of Interstim under MAC with Dr Lisa Chatman on 4/14/22.  We are asking for clearance

## 2022-04-04 NOTE — TELEPHONE ENCOUNTER
SURGERY 8[address]  [address] [place] [address] [address] [facility] [address] [facility], [address]      Phone *[phone] *[phone]   Surgical Scheduling Direct Line Phone *[phone] Fax *[phone]      Arleth Watson 1955 female    [address]  [address] [facility]   Marital Status:          Home Phone: [phone]      Cell Phone:    Telephone Information:   Mobile [phone]          Surgeon: Dr. Nolvia Perez Surgery Date: 4/14/22   Time: 10:30 am    Procedure: Removal of Interstim    Diagnosis: Urinary Incontinence    Important Medical History:  In Epic    Special Inst/Equip: Regular                                 Crystal notified    CPT Codes:    80014  Latex Allergy: No     Cardiac Device:  No    Anesthesia:  MAC          Admission Type:  Same Day                        Admit Prior to Day of Surgery: No    Case Location:  Main OR            Preadmission Testing:  Phone Call          PAT Date and Time:______________________________________________________    PAT Confirmation #: ______________________________________________________    Post Op Visit: ___________________________________________________________    Need Preop Cardiac Clearance: Yes    Does Patient have Cardiologist/physician?      Dr Kavitha Sanford Confirmation #: __________________________________________________    Roberta Fear: ________________________   Date: __________________________     [employer] Name: Maverick

## 2022-04-04 NOTE — TELEPHONE ENCOUNTER
Patient scheduled for surgery with Dr Usama Brink on 4/14/22. Surgery consent on arrival. Patient to do pre op urine culture on 4/5/22. Dr Felicia Marquez to clear.  Surgery instructions picked up by the patient on 4/5/22      [facility] notified and added to her calendar

## 2022-04-04 NOTE — TELEPHONE ENCOUNTER
DO NOT TAKE ASPIRIN,  FISH OIL, COUMADIN, IBUPROFEN, MOTRIN-LIKE DRUGS AND ANY MULTIVITAMINS OR OVER THE COUNTER SUPPLEMENTS 14 DAYS PRIOR TO SURGERY. HOLD THE PLAVIX 5 DAYS PRIOR    MUST HAVE AN ADULT OVER THE AGE OF 18 WITH YOU AT THE TIME OF THE PROCEDURE AND WITH YOU AT HOME AFTER THE PROCEDURE FOR 24 HOURS          Jannet Sinclair 1955 Diagnosis:     Surgical Physician: Dr. Birdie Michaels have been scheduled for the procedure marked below:      Surgery: Removal of Interstim         Date: 4/14/22     Anesthesia: MAC     Place of Service: [address] [address] Second Floor Same Day Surgery         Arrive to same day surgery by:  8:30 am  (Surgery time is subject to change)      INSTRUCTIONS AS MARKED BELOW:    1.  DO NOT eat or drink anything after midnight before surgery. 2.  We prefer you shower or bathe with an antibacterial soap (Dial) the morning of surgery. 3.  Please ensure to have a  with you to transport you home. 4.  Please bring a current medication list, photo ID and insurance card(s) with you  5. Okay to take Tylenol  6. If you take Glucophage, Metformin or Janumet, hold 48-hours prior to surgery  7. Take blood pressure or heart medication as directed, if taken in the morning take with a small sip of water  8. The office will call you in 1-2 days after your procedure to schedule a follow up. DATE SENSITIVE TESTING-DO ON THE DATE LISTED *WALK IN *NO APPOINTMENT    1. DO THE PRE OP URINE CULTURE ON 4/5/22.  ORDER INCLUDED        Date: 4/4/2022

## 2022-04-04 NOTE — TELEPHONE ENCOUNTER
Pre op Risk Assessment    Procedure Interstim Removal  Physician Dr. Rica Frank  Date of surgery/procedure 4-14-22    Last OV 12-14-21 Toribio Read)  6-16-21 Barbie Rivera)  Last Stress 7-7-21  Last Echo 7-7-21  Last Cath 11-17-17  Last Stent None in Epic  Is patient on blood thinners Plavix  Hold Meds/how many days ?

## 2022-04-05 ENCOUNTER — HOSPITAL ENCOUNTER (OUTPATIENT)
Dept: PULMONOLOGY | Age: 67
Discharge: HOME | End: 2022-04-05
Payer: COMMERCIAL

## 2022-04-05 ENCOUNTER — HOSPITAL ENCOUNTER (OUTPATIENT)
Age: 67
Discharge: HOME | End: 2022-04-05
Payer: COMMERCIAL

## 2022-04-05 DIAGNOSIS — Z01.818 PRE-OP TESTING: ICD-10-CM

## 2022-04-05 DIAGNOSIS — J43.9 PULMONARY EMPHYSEMA, UNSPECIFIED EMPHYSEMA TYPE (HCC): ICD-10-CM

## 2022-04-05 DIAGNOSIS — N39.490 URINARY INCONTINENCE, OVERFLOW: ICD-10-CM

## 2022-04-05 PROCEDURE — 94060 EVALUATION OF WHEEZING: CPT

## 2022-04-05 PROCEDURE — 87086 URINE CULTURE/COLONY COUNT: CPT

## 2022-04-05 PROCEDURE — 94726 PLETHYSMOGRAPHY LUNG VOLUMES: CPT

## 2022-04-05 PROCEDURE — 87077 CULTURE AEROBIC IDENTIFY: CPT

## 2022-04-05 PROCEDURE — 87186 SC STD MICRODIL/AGAR DIL: CPT

## 2022-04-05 PROCEDURE — 94729 DIFFUSING CAPACITY: CPT

## 2022-04-05 NOTE — PROGRESS NOTES
Follow all instructions given by your physician    NPO after midnight   Sips of water am of surgery with allowed medications  Bring insurance info and 's license  Wear comfortable clean clothing  No jewelry or contact lenses to be worn day of surgery  No glue on dentures morning of surgery;you will be asked to remove them for surgery. Case for glasses. Shower night before and morning of surgery with a liquid antibacterial soap, dry with fresh clean towel; no lotions, creams or powder. Clean sheets and pillow case on bed night before surgery  Bring medications in original bottles     needed at discharge and someone over 18 to stay with you for 24 hours overnight (surgery may be cancelled if you don't have this)  [facility] 2nd floor  If you would become ill prior to surgery, please call the surgeon  May have a visitor with you, we request that you limit to 2 visitors in pre-op area  Please bring and wear mask  Call [phone] for any questions  Covid questionnaire Complete; Patient negative for symptoms or exposure. See documentation.

## 2022-04-06 ENCOUNTER — TELEPHONE (OUTPATIENT)
Dept: PHARMACY | Age: 67
End: 2022-04-06

## 2022-04-06 LAB
ORGANISM: ABNORMAL
URINE CULTURE, ROUTINE: ABNORMAL

## 2022-04-06 NOTE — TELEPHONE ENCOUNTER
Referral to [facility]. [facility] received from Dr. Louisa Pineda for Smoking Cessation Counseling and Medication Management per Consult Agreement. I spoke with pt about our smoking cessation program.  Pt states she is not interested at this time and asked if we could call back in 1-2 mths and I told her we would call back.

## 2022-04-07 ENCOUNTER — TELEPHONE (OUTPATIENT)
Dept: UROLOGY | Age: 67
End: 2022-04-07

## 2022-04-07 ENCOUNTER — PREP FOR PROCEDURE (OUTPATIENT)
Dept: UROLOGY | Age: 67
End: 2022-04-07

## 2022-04-07 RX ORDER — AMOXICILLIN AND CLAVULANATE POTASSIUM 875; 125 MG/1; MG/1
1 TABLET, FILM COATED ORAL 2 TIMES DAILY
Qty: 14 TABLET | Refills: 0 | Status: SHIPPED | OUTPATIENT
Start: 2022-04-07 | End: 2022-04-14

## 2022-04-07 RX ORDER — SODIUM CHLORIDE 9 MG/ML
INJECTION, SOLUTION INTRAVENOUS CONTINUOUS
Status: CANCELLED | OUTPATIENT
Start: 2022-04-14

## 2022-04-07 NOTE — TELEPHONE ENCOUNTER
Please review urine culture on 4/5/22. Surgery with Enoc Larson on 4/14/22 for a Removal of Interstim Thanks.

## 2022-04-14 ENCOUNTER — ANESTHESIA EVENT (OUTPATIENT)
Dept: OPERATING ROOM | Age: 67
End: 2022-04-14
Payer: COMMERCIAL

## 2022-04-14 ENCOUNTER — ANESTHESIA (OUTPATIENT)
Dept: OPERATING ROOM | Age: 67
End: 2022-04-14
Payer: COMMERCIAL

## 2022-04-14 ENCOUNTER — HOSPITAL ENCOUNTER (OUTPATIENT)
Age: 67
Setting detail: OUTPATIENT SURGERY
Discharge: HOME | End: 2022-04-14
Attending: UROLOGY | Admitting: UROLOGY
Payer: COMMERCIAL

## 2022-04-14 VITALS
SYSTOLIC BLOOD PRESSURE: 136 MMHG | WEIGHT: 158.8 LBS | HEIGHT: 64 IN | HEART RATE: 75 BPM | TEMPERATURE: 97.3 F | BODY MASS INDEX: 27.11 KG/M2 | OXYGEN SATURATION: 97 % | RESPIRATION RATE: 18 BRPM | DIASTOLIC BLOOD PRESSURE: 72 MMHG

## 2022-04-14 VITALS — DIASTOLIC BLOOD PRESSURE: 74 MMHG | SYSTOLIC BLOOD PRESSURE: 143 MMHG | OXYGEN SATURATION: 96 %

## 2022-04-14 DIAGNOSIS — G89.18 POST-OP PAIN: Primary | ICD-10-CM

## 2022-04-14 LAB — INR BLD: 0.99 (ref 0.85–1.13)

## 2022-04-14 PROCEDURE — 6370000000 HC RX 637 (ALT 250 FOR IP): Performed by: UROLOGY

## 2022-04-14 PROCEDURE — 3700000000 HC ANESTHESIA ATTENDED CARE: Performed by: UROLOGY

## 2022-04-14 PROCEDURE — 3600000012 HC SURGERY LEVEL 2 ADDTL 15MIN: Performed by: UROLOGY

## 2022-04-14 PROCEDURE — 3600000002 HC SURGERY LEVEL 2 BASE: Performed by: UROLOGY

## 2022-04-14 PROCEDURE — 2580000003 HC RX 258: Performed by: UROLOGY

## 2022-04-14 PROCEDURE — 36415 COLL VENOUS BLD VENIPUNCTURE: CPT

## 2022-04-14 PROCEDURE — 6360000002 HC RX W HCPCS

## 2022-04-14 PROCEDURE — 6360000002 HC RX W HCPCS: Performed by: UROLOGY

## 2022-04-14 PROCEDURE — 85610 PROTHROMBIN TIME: CPT

## 2022-04-14 PROCEDURE — 7100000010 HC PHASE II RECOVERY - FIRST 15 MIN: Performed by: UROLOGY

## 2022-04-14 PROCEDURE — 2500000003 HC RX 250 WO HCPCS: Performed by: UROLOGY

## 2022-04-14 PROCEDURE — 2709999900 HC NON-CHARGEABLE SUPPLY: Performed by: UROLOGY

## 2022-04-14 PROCEDURE — 3700000001 HC ADD 15 MINUTES (ANESTHESIA): Performed by: UROLOGY

## 2022-04-14 PROCEDURE — 7100000011 HC PHASE II RECOVERY - ADDTL 15 MIN: Performed by: UROLOGY

## 2022-04-14 RX ORDER — OXYCODONE HYDROCHLORIDE AND ACETAMINOPHEN 5; 325 MG/1; MG/1
1 TABLET ORAL ONCE
Status: COMPLETED | OUTPATIENT
Start: 2022-04-14 | End: 2022-04-14

## 2022-04-14 RX ORDER — OXYCODONE HYDROCHLORIDE AND ACETAMINOPHEN 5; 325 MG/1; MG/1
1 TABLET ORAL EVERY 6 HOURS PRN
Qty: 28 TABLET | Refills: 0 | Status: SHIPPED | OUTPATIENT
Start: 2022-04-14 | End: 2022-04-21

## 2022-04-14 RX ORDER — LIDOCAINE HYDROCHLORIDE 10 MG/ML
INJECTION, SOLUTION EPIDURAL; INFILTRATION; INTRACAUDAL; PERINEURAL PRN
Status: DISCONTINUED | OUTPATIENT
Start: 2022-04-14 | End: 2022-04-14 | Stop reason: ALTCHOICE

## 2022-04-14 RX ORDER — SODIUM CHLORIDE 9 MG/ML
INJECTION, SOLUTION INTRAVENOUS CONTINUOUS
Status: DISCONTINUED | OUTPATIENT
Start: 2022-04-14 | End: 2022-04-14 | Stop reason: HOSPADM

## 2022-04-14 RX ORDER — FENTANYL CITRATE 50 UG/ML
INJECTION, SOLUTION INTRAMUSCULAR; INTRAVENOUS PRN
Status: DISCONTINUED | OUTPATIENT
Start: 2022-04-14 | End: 2022-04-14 | Stop reason: SDUPTHER

## 2022-04-14 RX ORDER — DOXYCYCLINE HYCLATE 100 MG
100 TABLET ORAL 2 TIMES DAILY
Qty: 14 TABLET | Refills: 0 | Status: SHIPPED | OUTPATIENT
Start: 2022-04-14 | End: 2022-04-21

## 2022-04-14 RX ORDER — PROPOFOL 10 MG/ML
INJECTION, EMULSION INTRAVENOUS PRN
Status: DISCONTINUED | OUTPATIENT
Start: 2022-04-14 | End: 2022-04-14 | Stop reason: SDUPTHER

## 2022-04-14 RX ORDER — PROPOFOL 10 MG/ML
INJECTION, EMULSION INTRAVENOUS CONTINUOUS PRN
Status: DISCONTINUED | OUTPATIENT
Start: 2022-04-14 | End: 2022-04-14 | Stop reason: SDUPTHER

## 2022-04-14 RX ADMIN — PROPOFOL 150 MCG/KG/MIN: 10 INJECTION, EMULSION INTRAVENOUS at 10:49

## 2022-04-14 RX ADMIN — FENTANYL CITRATE 50 MCG: 50 INJECTION, SOLUTION INTRAMUSCULAR; INTRAVENOUS at 10:54

## 2022-04-14 RX ADMIN — SODIUM CHLORIDE: 9 INJECTION, SOLUTION INTRAVENOUS at 08:54

## 2022-04-14 RX ADMIN — Medication 2000 MG: at 10:49

## 2022-04-14 RX ADMIN — Medication 40 MG: at 10:43

## 2022-04-14 RX ADMIN — PROPOFOL 80 MG: 10 INJECTION, EMULSION INTRAVENOUS at 10:43

## 2022-04-14 RX ADMIN — OXYCODONE AND ACETAMINOPHEN 1 TABLET: 5; 325 TABLET ORAL at 12:02

## 2022-04-14 ASSESSMENT — PULMONARY FUNCTION TESTS
PIF_VALUE: 0
PIF_VALUE: 1
PIF_VALUE: 0
PIF_VALUE: 1
PIF_VALUE: 1
PIF_VALUE: 0
PIF_VALUE: 0
PIF_VALUE: 1
PIF_VALUE: 1
PIF_VALUE: 0
PIF_VALUE: 1
PIF_VALUE: 1
PIF_VALUE: 2
PIF_VALUE: 0
PIF_VALUE: 1
PIF_VALUE: 0
PIF_VALUE: 1
PIF_VALUE: 3
PIF_VALUE: 0

## 2022-04-14 ASSESSMENT — PAIN SCALES - GENERAL
PAINLEVEL_OUTOF10: 8
PAINLEVEL_OUTOF10: 5

## 2022-04-14 ASSESSMENT — PAIN DESCRIPTION - DESCRIPTORS: DESCRIPTORS: ACHING

## 2022-04-14 ASSESSMENT — PAIN - FUNCTIONAL ASSESSMENT: PAIN_FUNCTIONAL_ASSESSMENT: 0-10

## 2022-04-14 NOTE — OP NOTE
Operative Note      Patient: Johnnie Kruse  YOB: 1955  MRN: [id number]    Date of Procedure: 4/14/2022    Pre-Op Diagnosis: Urinary incontinence; poor response to sacral nerve stimulation    Post-Op Diagnosis: Same       Procedure(s):  Removal of Interstim battery and lead    Surgeon(s):  Albert Braga MD    Assistant:   * No surgical staff found *    Anesthesia: Monitor Anesthesia Care    Estimated Blood Loss (mL): Minimal    Complications: None    Specimens:   * No specimens in log *    Implants:  Implant Name Type Inv. Item Serial No.  Lot No. LRB No. Used Action   KIT LEAD SURE SCAN INTERSTIM MRI Spine:Stimulator KIT LEAD SURE SCAN INTERSTIM MRI  [employer] [id number] N/A 1 Explanted         Drains: * No LDAs found *    Findings: Intact battery and lead    Detailed Description of Procedure:   Patient was brought back to the operating room laid in supine position. She was positioned prone and MAC anesthesia was inducted. Timeout was performed. Her buttocks in the area of the InterStim was prepped and draped in sterile fashion. I infiltrated the old incision with 1% lidocaine. I made an incision and dissected down to the pocket and was able to visualize the battery and remove this from the pocket. I traced the lead to the more medial aspect where it was inserting and made a counterincision there. I dissected down the subcutaneous tissue and was able to find the lead. I was able to grasp the lead and remove it from the S3 foramen intact without any issues. The device was passed off. At this time the patient started coughing and started vomiting and her airway was in question. We had to expedite the closure. At this time I used staples to close the skin of both the main incision and the counterincision. We then placed 4 x 4 gauze and Tegaderm. The patient was then flipped prone and her airway was secured and she recovered well.       Patient will follow up in 10 to 12 days for a MA visit to remove the staples.   Will follow-up in 3 to 4 months in the office      Electronically signed by Grace Barreto M.D, MD on 4/14/2022 at 11:13 AM

## 2022-04-14 NOTE — PROGRESS NOTES
Pt has met discharge criteria and states she is ready for discharge to home. IV removed, gauze and tape applied. Dressed in own clothes and personal belongings gathered. Discharge instructions (with opioid medication education information) given to pt and family; pt and family verbalized understanding of discharge instructions, prescriptions and follow up appointments. Pt transported to discharge lobby by [facility] staff.

## 2022-04-14 NOTE — PROGRESS NOTES
Pt returned to [facility] room 18. Vitals and assessment as charted. 0.9 infusing, @150ml to count from PACU. Pt has boxed lunch and pepsi. Family at the bedside. Pt and family verbalized understanding of discharge criteria and call light use. Call light in reach.

## 2022-04-14 NOTE — PROGRESS NOTES
Pt admitted to [facility] room 18 and oriented to unit. SCD sleeves applied. Nares swabbed. Pt verbalized permission for first name, last initial and physicians name on white board. SDS board and discharge criteria explained, pt and family verbalized understanding. Pt denies thoughts of harming self or others. Call light in reach. Sister at the bedside.

## 2022-04-14 NOTE — H&P
History and Physical    Patient:  Tra Lares  MRN: [id number]  YOB: 1955    CHIEF COMPLAINT:  OAB, poor response to interstim treatment    HISTORY OF PRESENT ILLNESS:   The patient is a 61 y.o. female who presents with as above, here for surgery    Patient's old records, notes and chart reviewed and summarized above.      Past Medical History:    Past Medical History:   Diagnosis Date    Acute renal failure with acute cortical necrosis (HCC) 12/21/2019    Allergic rhinitis     Arthritis     spine    Bipolar 1 disorder ([address]     Blood circulation, collateral     Cancer ([address] 2011    cancerous polyps removed - Dr. Danika Miles Colon polyps     COPD (chronic obstructive pulmonary disease) ([address] 7/24/2014    Coronary vasospasm ([address] 8/17/2019    Depression     Diverticulitis of colon     GERD (gastroesophageal reflux disease)     Hiatal hernia     History of arterial ischemic stroke 7/20/2019    History of colonoscopy 2002    History of kidney stones     Hx of blood clots 6/17/2014    PE and collar bone area after shoulder surgery    Hyperlipidemia     Hypertension     Liver disease     enlarged liver - damaged with alcohol in past but no cirrhosis per patient    Pneumonia 7/24/2014    Suicidal thoughts     2015 [facility]    Thyroid disease     Vomiting     Wears dentures     Wears glasses        Past Surgical History:    Past Surgical History:   Procedure Laterality Date    ABDOMEN SURGERY      x4 removed cysts and ovary removed    CARDIAC SURGERY      heart caths, no stents    CARPAL TUNNEL RELEASE Bilateral 2016    CHOLECYSTECTOMY, LAPAROSCOPIC  6/12/15    Dr. Billie Anne    COLONOSCOPY  2011    CYSTOSCOPY N/A 2/10/2022    CYSTOSCOPY URETHRAL IMPLANT INJECTION performed by Negar Caruso MD at [address] N/A 7/13/2020    CYSTOSCOPY WITH BLADDER BIOPSIES performed by Obed Miranda MD at [address] [place] [address] ESOPHAGUS      ELBOW SURGERY Right 10/7/2004    Dr. Mamadou Lazo Bilateral 2016    decompression   Leslie Strafford    Dr. Ball Gear with [address]    ROTATOR CUFF REPAIR  2004, 2014    right shoulder    SHOULDER SURGERY  2014    right    SKIN BIOPSY  2006    under left eye    STIMULATOR SURGERY N/A 11/4/2020    STAGE 1 INTERSTIM PLACEMENT performed by Mona Heath MD at [place] [address] 11/23/2020    [facility] STAGE II [facility]     Medications Prior to Admission:    Prior to Admission medications    Medication Sig Start Date End Date Taking? Authorizing Provider   amoxicillin-clavulanate (AUGMENTIN) 875-125 MG per tablet Take 1 tablet by mouth 2 times daily for 7 days 4/7/22 4/14/22  BA Hawley - CNP   Pantoprazole Sodium (PROTONIX PO) Take 400 mg by mouth    Historical Provider, MD   fluticasone-umeclidin-vilant (TRELEGY ELLIPTA) 100-62.5-25 MCG/INH AEPB Inhale 1 puff into the lungs daily 3/31/22   Valerie Shrestha MD   phenazopyridine (PYRIDIUM) 200 MG tablet Take 1 tablet by mouth 3 times daily as needed for Pain 2/10/22   Braxton Pastor MD   ibuprofen (ADVIL;MOTRIN) 600 MG tablet Take 600 mg by mouth every 6 hours as needed for Pain    Historical Provider, MD   QUEtiapine (SEROQUEL) 300 MG tablet Take 300 mg by mouth nightly 2 tab    Historical Provider, MD   carvedilol (COREG) 3.125 MG tablet TAKE 1 TABLET BY MOUTH TWICE DAILY 7/1/20   Historical Provider, MD   famotidine (PEPCID) 20 MG tablet Take 20 mg by mouth daily    Historical Provider, MD   aspirin 81 MG EC tablet Take 1 tablet by mouth daily 2/22/21   Lena Weir MD   nitroGLYCERIN (NITROSTAT) 0.4 MG SL tablet up to max of 3 total doses.  If no relief after 1 dose, call 911. 2/22/21   Lena Weir MD   hydrOXYzine (VISTARIL) 50 MG capsule Take 1 capsule by mouth 3 times daily as needed for Itching or Anxiety 2/22/21   Cecile Centeno MD   topiramate (TOPAMAX) 50 MG tablet Take 1 tablet by mouth 2 times daily 2/22/21   Cecile Centeno MD   sucralfate (CARAFATE) 1 GM tablet Take 1 tablet by mouth 4 times daily 2/22/21   Cecile Centeno MD   QUEtiapine (SEROQUEL) 200 MG tablet Take 200 mg by mouth daily PM    Historical Provider, MD   clopidogrel (PLAVIX) 75 MG tablet Take 75 mg by mouth daily    Historical Provider, MD   albuterol (PROVENTIL) (2.5 MG/3ML) 0.083% nebulizer solution Take 3 mLs by nebulization every 6 hours as needed for Wheezing 8/26/20   BA Worley CNP   Roflumilast (DALIRESP) 500 MCG tablet Take 500 mcg by mouth daily    Historical Provider, MD   Tiotropium Bromide Monohydrate (SPIRIVA HANDIHALER IN) Inhale 2 puffs into the lungs daily    Historical Provider, MD   acetaminophen (MAPAP) 325 MG tablet Take by mouth every 6 hours as needed for Pain 2 tab    Historical Provider, MD   potassium chloride (KLOR-CON M) 20 MEQ extended release tablet Take by mouth daily 2 tab 5/20/20   Historical Provider, MD   levothyroxine (SYNTHROID) 75 MCG tablet Take 1 tablet by mouth daily everyday except Sunday take 150 mcg. 12/26/19   BA Toney CNP   fluticasone (FLONASE) 50 MCG/ACT nasal spray 1 spray by Each Nostril route 2 times daily 12/26/19   BA Toney CNP   ferrous sulfate 325 (65 Fe) MG tablet Take 1 tablet by mouth 2 times daily 12/26/19   BA Toney CNP   folic acid (FOLVITE) 1 MG tablet Take 1 mg by mouth daily    Historical Provider, MD   ARIPiprazole (ABILIFY) 2 MG tablet Take 2 mg by mouth daily    Historical Provider, MD   atorvastatin (LIPITOR) 40 MG tablet Take 40 mg by mouth nightly     Historical Provider, MD   escitalopram (LEXAPRO) 20 MG tablet Take 30 mg by mouth daily     Historical Provider, MD   fenofibrate 160 MG tablet Take 160 mg by mouth daily     Historical Provider, MD   traZODone (DESYREL) 100 MG tablet Take 2 tablets by mouth nightly  Patient taking differently: Take 150 mg by mouth nightly 2 tabs prn 10/31/18   Nadeen Hsu MD       Allergies:  Seasonal    Social History:    Social History     Socioeconomic History    Marital status:      Spouse name: Not on file    Number of children: 3    Years of education: 15    Highest education level: Not on file   Occupational History    Occupation: on disability     Employer: [employer]   Tobacco Use    Smoking status: Current Every Day Smoker     Packs/day: 0.50     Years: 30.00     Pack years: 15.00     Types: Cigarettes     Start date: 4/22/1977    Smokeless tobacco: Never Used   Vaping Use    Vaping Use: Never used   Substance and Sexual Activity    Alcohol use: No     Comment: none for 2 years    Drug use: Not Currently     Frequency: 1.0 times per week     Types: Cocaine     Comment: m-1    Sexual activity: Not Currently   Other Topics Concern    Not on file   Social History Narrative    Not on file     Social Determinants of Health     Financial Resource Strain:     Difficulty of Paying Living Expenses: Not on file   Food Insecurity:     Worried About Running Out of Food in the Last Year: Not on file    Kirstin of Food in the Last Year: Not on file   Transportation Needs:     Lack of Transportation (Medical): Not on file    Lack of Transportation (Non-Medical):  Not on file   Physical Activity:     Days of Exercise per Week: Not on file    Minutes of Exercise per Session: Not on file   Stress:     Feeling of Stress : Not on file   Social Connections:     Frequency of Communication with Friends and Family: Not on file    Frequency of Social Gatherings with Friends and Family: Not on file    Attends [employer] Services: Not on file    Active Member of Clubs or Organizations: Not on file    Attends Club or Organization Meetings: Not on file    Marital Status: Not on file   Intimate Partner Violence:     Fear of Current or Ex-Partner: Not on file    Emotionally Abused: Not on file    Physically Abused: Not on file    Sexually Abused: Not on file   Housing Stability:     Unable to Pay for Housing in the Last Year: Not on file    Number of [place] in the Last Year: Not on file    Unstable Housing in the Last Year: Not on file       Family History:    Family History   Problem Relation Age of Onset    Cancer Mother     Heart Disease Mother     High Blood Pressure Mother     High Cholesterol Mother     Cancer Father         brain    Depression Father     Heart Disease Father     High Cholesterol Father     Mental Illness Father     Cancer Sister     Heart Attack Sister     Heart Disease Maternal Uncle     High Cholesterol Maternal Uncle     Diabetes Maternal Grandmother     Heart Disease Maternal Grandmother     High Cholesterol Maternal Grandmother     Early Death Maternal Grandfather     Heart Disease Maternal Grandfather     High Cholesterol Maternal Grandfather     Stroke Maternal Grandfather     Heart Disease Paternal Grandmother     High Cholesterol Paternal Grandmother     Heart Disease Paternal Grandfather     High Cholesterol Paternal Grandfather        REVIEW OF SYSTEMS:  Constitutional: negative  Eyes: negative  Respiratory: negative  Cardiovascular: negative  Gastrointestinal: negative  Genitourinary: see HPI  Musculoskeletal: negative  Skin: negative   Neurological: negative  Hematological/Lymphatic: negative  Psychological: negative    Physical Exam:      Patient Vitals for the past 24 hrs:   BP Temp Temp src Pulse Resp SpO2 Height Weight   04/14/22 0821 -- -- -- -- -- -- 5' 4\" (1.626 m) 158 lb 12.8 oz (72 kg)   04/14/22 0813 138/67 96.9 °F (36.1 °C) Temporal 75 20 95 % -- --     Constitutional: Patient in no acute distress; Neuro: alert and oriented to person place and time.     Psych: Mood and affect normal.  Skin: Normal  Lungs: Respiratory effort normal, CTA  Cardiovascular: Normal peripheral pulses; no murmur  Abdomen: Soft, non-tender, non-distended with no CVA, flank pain, hepatosplenomegaly or hernia. Kidneys normal.  Bladder non-tender and not distended. LABS:   No results for input(s): WBC, HGB, HCT, MCV, PLT in the last 72 hours. No results for input(s): NA, K, CL, CO2, PHOS, BUN, CREATININE, CA in the last 72 hours. No results found for: PSA        Urinalysis: No results for input(s): COLORU, PHUR, LABCAST, WBCUA, RBCUA, MUCUS, TRICHOMONAS, YEAST, BACTERIA, CLARITYU, SPECGRAV, LEUKOCYTESUR, UROBILINOGEN, [place] Mean in the last 72 hours.     Invalid input(s): NITRATE, GLUCOSEUKETONESUAMORPHOUS     -----------------------------------------------------------------      Assessment and Plan   Impression:    Patient Active Problem List   Diagnosis    GERD (gastroesophageal reflux disease)    Colon polyps    Chest pain, atypical    Gastroenteritis    Pneumonia    Shoulder pain    History of pulmonary embolism    COPD [facility]ch[facility] disease) [facility]    Hypoglycemia    Anticoagulated on Coumadin    Bipolar disorder ([facility]    Cannabis abuse    Cocaine abuse ([facility]    Alcohol dependence in remission ([facility]    Cholecystitis with cholelithiasis    GI bleed    Erosive esophagitis    Hypokalemia    HTN (hypertension), benign    Bipolar 1 disorder (HCC)    Chronic pain    Hiatal hernia    Chest pain    Vomiting    Erosive gastritis    H pylori ulcer    Hematemesis    History of Helicobacter pylori infection    Intractable abdominal pain    Intractable vomiting with nausea    Epigastric abdominal pain    Acute blood loss anemia    Chronic chest pain    Hyponatremia    Metabolic acidosis    Essential hypertension    COPD with acute exacerbation (HCC)    Hypothyroidism    History of DVT (deep vein thrombosis)    Depression    Tobacco abuse    Hematemesis with nausea    Hypoxia    Pleural effusion, bilateral    Suicidal ideation    Bipolar disorder, in partial remission, most recent episode depressed ([address]    Bipolar II disorder ([address]    Diastolic dysfunction    Angina, class II ([address]    Unstable angina ([address]    Dyslipidemia    Electrolyte abnormality    COPD exacerbation (HCC)    Acute respiratory insufficiency    Chronic diastolic heart failure (HCC)    Tobacco use disorder    Nasal septal deviation    Hypertrophy of left inferior nasal turbinate    Rhinogenic headache    Asymmetrical sensorineural hearing loss    Tinnitus, left ear    Psychosis (HCC)    Substance-induced psychotic disorder (HCC)    Bipolar 1 disorder, depressed ([address]    Polysubstance abuse ([address]    Major depressive disorder, recurrent (HCC)    Stroke-like symptom    Right arm weakness    Delirium    Paresthesias    Depression, major, recurrent ([address]    Depression with suicidal ideation    Amnesia    Bipolar disorder, current episode mixed, moderate (HCC)    Llfkbmjrw-Foxvafu-Thvufh disease    Carpal tunnel syndrome of left wrist    Change of, skin texture    Chronic midline low back pain without sciatica    Coronary vasospasm (HCC)    Derangement of knee    Disorder associated with Helicobacter species    Disturbance of skin sensation    Encounter for surgical follow-up care    Endometriosis    Environmental and seasonal allergies    Glaucoma suspect    History of arterial ischemic stroke    Mixed hyperlipidemia    Large liver    Lesion of ulnar nerve    Nasal congestion    Nicotine dependence    Pancreatic insufficiency    Primary osteoarthritis involving multiple joints    Sciatica    Sprain of right foot    Preoperative state    Type 2 diabetes mellitus without complication, without long-term current use of insulin (HCC)    Urinary incontinence, overflow    Elevated lactic acid level    Acute renal failure with acute cortical necrosis (HCC)    Pyelonephritis of left kidney    Pyelonephritis    Septicemia ([facility]       Plan:   Risks: I discussed all the risks, benefits, alternatives and possible complications or surgery as well as expectations and post-op recovery.       Consent obtained; interstim removal in OR today    Ann Marrero M.D, MD  8:28 AM 4/14/2022

## 2022-04-14 NOTE — ANESTHESIA PRE PROCEDURE
Department of Anesthesiology  Preprocedure Note       Name:  Scott April   Age:  61 y.o.  :  1955                                          MRN:  [id number]         Date:  2022      Surgeon: Dinah Treadwellor):  Darion Valencia MD    Procedure: Procedure(s):  Removal of Interstim    Medications prior to admission:   Prior to Admission medications    Medication Sig Start Date End Date Taking? Authorizing Provider   amoxicillin-clavulanate (AUGMENTIN) 875-125 MG per tablet Take 1 tablet by mouth 2 times daily for 7 days 22  BA Huitron - CNP   Pantoprazole Sodium (PROTONIX PO) Take 400 mg by mouth    Historical Provider, MD   fluticasone-umeclidin-vilant (TRELEGY ELLIPTA) 100-62.5-25 MCG/INH AEPB Inhale 1 puff into the lungs daily 3/31/22   Ronda Moore MD   phenazopyridine (PYRIDIUM) 200 MG tablet Take 1 tablet by mouth 3 times daily as needed for Pain 2/10/22   Darion Valencia MD   ibuprofen (ADVIL;MOTRIN) 600 MG tablet Take 600 mg by mouth every 6 hours as needed for Pain    Historical Provider, MD   QUEtiapine (SEROQUEL) 300 MG tablet Take 300 mg by mouth nightly 2 tab    Historical Provider, MD   carvedilol (COREG) 3.125 MG tablet TAKE 1 TABLET BY MOUTH TWICE DAILY 20   Historical Provider, MD   famotidine (PEPCID) 20 MG tablet Take 20 mg by mouth daily    Historical Provider, MD   aspirin 81 MG EC tablet Take 1 tablet by mouth daily 21   Tiffanie Razo MD   nitroGLYCERIN (NITROSTAT) 0.4 MG SL tablet up to max of 3 total doses.  If no relief after 1 dose, call 911. 21   Tiffanie Razo MD   hydrOXYzine (VISTARIL) 50 MG capsule Take 1 capsule by mouth 3 times daily as needed for Itching or Anxiety 21   Tiffanie Razo MD   topiramate (TOPAMAX) 50 MG tablet Take 1 tablet by mouth 2 times daily 21   Tiffanie Razo MD   sucralfate (CARAFATE) 1 GM tablet Take 1 tablet by mouth 4 times daily 21   Tiffanie Razo MD   QUEtiapine (SEROQUEL) 200 MG tablet Take 200 mg by mouth daily PM    Historical Provider, MD   clopidogrel (PLAVIX) 75 MG tablet Take 75 mg by mouth daily    Historical Provider, MD   albuterol (PROVENTIL) (2.5 MG/3ML) 0.083% nebulizer solution Take 3 mLs by nebulization every 6 hours as needed for Wheezing 8/26/20   BA Worley CNP   Roflumilast (DALIRESP) 500 MCG tablet Take 500 mcg by mouth daily    Historical Provider, MD   Tiotropium Bromide Monohydrate (SPIRIVA HANDIHALER IN) Inhale 2 puffs into the lungs daily    Historical Provider, MD   acetaminophen (MAPAP) 325 MG tablet Take by mouth every 6 hours as needed for Pain 2 tab    Historical Provider, MD   potassium chloride (KLOR-CON M) 20 MEQ extended release tablet Take by mouth daily 2 tab 5/20/20   Historical Provider, MD   levothyroxine (SYNTHROID) 75 MCG tablet Take 1 tablet by mouth daily everyday except Sunday take 150 mcg. 12/26/19   BA Jacinto CNP   fluticasone (FLONASE) 50 MCG/ACT nasal spray 1 spray by Each Nostril route 2 times daily 12/26/19   BA Jacinto CNP   ferrous sulfate 325 (65 Fe) MG tablet Take 1 tablet by mouth 2 times daily 12/26/19   BA Jacinto CNP   folic acid (FOLVITE) 1 MG tablet Take 1 mg by mouth daily    Historical Provider, MD   ARIPiprazole (ABILIFY) 2 MG tablet Take 2 mg by mouth daily    Historical Provider, MD   atorvastatin (LIPITOR) 40 MG tablet Take 40 mg by mouth nightly     Historical Provider, MD   escitalopram (LEXAPRO) 20 MG tablet Take 30 mg by mouth daily     Historical Provider, MD   fenofibrate 160 MG tablet Take 160 mg by mouth daily     Historical Provider, MD   traZODone (DESYREL) 100 MG tablet Take 2 tablets by mouth nightly  Patient taking differently: Take 150 mg by mouth nightly 2 tabs prn 10/31/18   Jessica Smith MD       Current medications:    Current Facility-Administered Medications   Medication Dose Route Frequency Provider Last Rate Last Admin    0.9 % sodium chloride infusion   IntraVENous Continuous Braxton Pastor MD        ceFAZolin (ANCEF) 2000 mg in sterile water 20 mL IV syringe  2,000 mg IntraVENous MD Lavell           Allergies: Allergies   Allergen Reactions    Seasonal Other (See Comments)     sneezing       Problem List:    Patient Active Problem List   Diagnosis Code    GERD (gastroesophageal reflux disease) K21.9    Colon polyps K63.5    Chest pain, atypical R07.89    Gastroenteritis K52.9    Pneumonia J18.9    Shoulder pain M25.519    History of pulmonary embolism Z86.711    COPD (chronic obstructive [facility] disease) ([facility] J44.9    Hypoglycemia E16.2    Anticoagulated on Coumadin Z79.01    Bipolar disorder ([facility]) F31.9    Cannabis abuse F12.10    Cocaine abuse ([facility] F14.10    Alcohol dependence in remission ([facility] F10.21    Cholecystitis with cholelithiasis K80.10    GI bleed K92.2    Erosive esophagitis K22.10    Hypokalemia E87.6    HTN (hypertension), benign I10    Bipolar 1 disorder ([facility]) F31.9    Chronic pain G89.29    Hiatal hernia K44.9    Chest pain R07.9    Vomiting R11.10    Erosive gastritis K29.60    H pylori ulcer K27.9, B96.81    Hematemesis K92.0    History of Helicobacter pylori infection Z86.19    Intractable abdominal pain R10.9    Intractable vomiting with nausea R11.2    Epigastric abdominal pain R10.13    Acute blood loss anemia D62    Chronic chest pain R07.9, G89.29    Hyponatremia Z53.8    Metabolic acidosis R66.5    Essential hypertension I10    COPD with acute exacerbation ([facility]) J44.1    Hypothyroidism E03.9    History of DVT (deep vein thrombosis) Z86.718    Depression F32. A    Tobacco abuse Z72.0    Hematemesis with nausea K92.0    Hypoxia R09.02    Pleural effusion, bilateral J90    Suicidal ideation R45.851    Bipolar disorder, in partial remission, most recent episode depressed ([facility]) F31.75    Bipolar II disorder ([facility] Z74.02    Diastolic dysfunction I51.89    Angina, class II ([facility]) I20.9    Unstable angina ([facility]) I20.0    Dyslipidemia E78.5    Electrolyte abnormality E87.8    COPD exacerbation ([facility]) J44.1    Acute respiratory insufficiency R06.89    Chronic diastolic heart failure ([facility]) I50.32    Tobacco use disorder F17.200    Nasal septal deviation J34.2    Hypertrophy of left inferior nasal turbinate J34.3    Rhinogenic headache R51.9    Asymmetrical sensorineural hearing loss H90.3    Tinnitus, left ear H93.12    Psychosis ([facility]) F29    Substance-induced psychotic disorder ([facility]) F19.959    Bipolar 1 disorder, depressed ([facility]) F31.9    Polysubstance abuse ([facility] F19.10    Major depressive disorder, recurrent ([facility] F33.9    Stroke-like symptom R29.90    Right arm weakness R29.898    Delirium R41.0    Paresthesias R20.2    Depression, major, recurrent ([facility]) F33.9    Depression with suicidal ideation F32. A, R45.851    Amnesia R41.3    Bipolar disorder, current episode mixed, moderate ([facility]) F31.62    Qfjbwceht-Tlboyaz-Tryzbg disease M22.40    Carpal tunnel syndrome of left wrist G56.02    Change of, skin texture R23.4    Chronic midline low back pain without sciatica M54.50, G89.29    Coronary vasospasm ([facility]) I20.1    Derangement of knee M23.90    Disorder associated with Helicobacter species E18.44    Disturbance of skin sensation R20.9    Encounter for surgical follow-up care Z48.89    Endometriosis N80.9    Environmental and seasonal allergies J30.89    Glaucoma suspect H40.009    History of arterial ischemic stroke Z86.73    Mixed hyperlipidemia E78.2    Large liver R16.0    Lesion of ulnar nerve G56.20    Nasal congestion R09.81    Nicotine dependence F17.200    Pancreatic insufficiency K86.89    Primary osteoarthritis involving multiple joints M89.49    Sciatica M54.30    Sprain of right foot S93.601A    Preoperative state [id number]    Type 2 diabetes mellitus without complication, without long-term current use of insulin (HCC) E11.9    Urinary incontinence, overflow N39.490    Elevated lactic acid level R79.89    Acute renal failure with acute cortical necrosis (HCC) N17.1    Pyelonephritis of left kidney N12    Pyelonephritis N12    Septicemia ([address] A41.9       Past Medical History:        Diagnosis Date    Acute renal failure with acute cortical necrosis (HCC) 12/21/2019    Allergic rhinitis     Arthritis     spine    Bipolar 1 disorder ([address]     Blood circulation, collateral     Cancer ([address] 2011    cancerous polyps removed - Dr. Partida Calkin Colon polyps     COPD (chronic obstructive pulmonary disease) ([address] 7/24/2014    Coronary vasospasm ([facility] 8/17/2019    Depression     Diverticulitis of colon     GERD (gastroesophageal reflux disease)     Hiatal hernia     History of arterial ischemic stroke 7/20/2019    History of colonoscopy 2002    History of kidney stones     Hx of blood clots 6/17/2014    PE and collar bone area after shoulder surgery    Hyperlipidemia     Hypertension     Liver disease     enlarged liver - damaged with alcohol in past but no cirrhosis per patient    Pneumonia 7/24/2014    Suicidal thoughts     2015 admitted to  from [facility]    Thyroid disease     Vomiting     Wears dentures     Wears glasses        Past Surgical History:        Procedure Laterality Date    ABDOMEN SURGERY      x4 removed cysts and ovary removed    CARDIAC SURGERY      heart caths, no stents    CARPAL TUNNEL RELEASE Bilateral 2016    CHOLECYSTECTOMY, LAPAROSCOPIC  6/12/15    Dr. C[facility]    COLONOSCOPY  2011    CYSTOSCOPY N/A 2/10/2022    CYSTOSCOPY URETHRAL IMPLANT INJECTION performed by Gloria Bosworth, MD at [address] N/A 7/13/2020    CYSTOSCOPY WITH BLADDER BIOPSIES performed by Denzel Hickey MD at [facility], [address] Right 10/7/2004    Dr. John Sharif Bilateral 2016    decompression    Clive HUTCHINSON with [facility]    ROTATOR CUFF REPAIR  2004, 2014    right shoulder    SHOULDER SURGERY  2014    right    SKIN BIOPSY  2006    under left eye    STIMULATOR SURGERY N/A 11/4/2020    STAGE 1 INTERSTIM PLACEMENT performed by Fam Jose MD at [address] N/A 11/23/2020    PLACEMENT OF STAGE II INTERSTIM performed by Fam Jose MD at [facility] History:    Social History     Tobacco Use    Smoking status: Current Every Day Smoker     Packs/day: 0.50     Years: 30.00     Pack years: 15.00     Types: Cigarettes     Start date: 4/22/1977    Smokeless tobacco: Never Used   Substance Use Topics    Alcohol use: No     Comment: none for 2 years                                Ready to quit: Not Answered  Counseling given: Not Answered      Vital Signs (Current):   Vitals:    04/05/22 1606 04/14/22 0813   BP:  138/67   Pulse:  75   Resp:  20   Temp:  96.9 °F (36.1 °C)   TempSrc:  Temporal   SpO2:  95%   Weight: 156 lb (70.8 kg)    Height: 5' 4\" (1.626 m)                                               BP Readings from Last 3 Encounters:   04/14/22 138/67   03/31/22 124/72   03/31/22 124/75       NPO Status:                                                                                 BMI:   Wt Readings from Last 3 Encounters:   04/05/22 156 lb (70.8 kg)   03/31/22 156 lb (70.8 kg)   03/31/22 156 lb 6.4 oz (70.9 kg)     Body mass index is 26.78 kg/m².     CBC:   Lab Results   Component Value Date    WBC 9.3 01/11/2022    RBC 5.13 01/11/2022    RBC 4.41 04/19/2012    HGB 14.6 01/11/2022    HCT 46.4 01/11/2022    MCV 90.4 01/11/2022    RDW 14.5 12/12/2019     01/11/2022       CMP:   Lab Results   Component Value Date     01/11/2022    K 4.4 01/11/2022    K 3.6 11/03/2021     01/11/2022    CO2 26 01/11/2022    BUN 12 01/11/2022    CREATININE 0.9 01/11/2022    GFRAA >60 02/25/2019    LABGLOM 63 01/11/2022    GLUCOSE 134 01/11/2022    GLUCOSE 155 12/12/2019    PROT 7.2 01/11/2022    CALCIUM 10.6 01/11/2022    BILITOT 0.2 01/11/2022    ALKPHOS 95 01/11/2022    AST 19 01/11/2022    ALT 19 01/11/2022       POC Tests: No results for input(s): POCGLU, POCNA, POCK, POCCL, POCBUN, POCHEMO, POCHCT in the last 72 hours. Coags:   Lab Results   Component Value Date    PROTIME 14.6 12/12/2019    INR 1.06 02/10/2022    APTT 33.0 02/20/2021       HCG (If Applicable): No results found for: PREGTESTUR, PREGSERUM, HCG, HCGQUANT     ABGs: No results found for: PHART, PO2ART, FCC5DNX, IBF4WWW, BEART, X0UTWIIR     Type & Screen (If Applicable):  Lab Results   Component Value Date    LABRH POS 11/17/2017       Drug/Infectious Status (If Applicable):  Lab Results   Component Value Date    HEPCAB Negative 10/12/2015       COVID-19 Screening (If Applicable):   Lab Results   Component Value Date    COVID19 NOT  DETECTED 11/03/2021    COVID19 Not Detected 11/16/2020           Anesthesia Evaluation    Airway: Mallampati: II  TM distance: >3 FB   Neck ROM: full  Mouth opening: > = 3 FB Dental:          Pulmonary:   (+) COPD:  decreased breath sounds,                             Cardiovascular:    (+) hypertension:, CAD:,         Rhythm: regular                      Neuro/Psych:   (+) CVA:, psychiatric history:            GI/Hepatic/Renal:   (+) hiatal hernia, GERD:, PUD, liver disease:,           Endo/Other:    (+) Diabetes, hypothyroidism::., .                 Abdominal:             Vascular: Other Findings:             Anesthesia Plan      MAC     ASA 4       Induction: intravenous. Anesthetic plan and risks discussed with patient. Plan discussed with CRNA.                   Bladimir Guadarrama MD   4/14/2022

## 2022-04-26 ENCOUNTER — HOSPITAL ENCOUNTER (EMERGENCY)
Age: 67
Discharge: HOME | End: 2022-04-26
Attending: EMERGENCY MEDICINE
Payer: COMMERCIAL

## 2022-04-26 ENCOUNTER — APPOINTMENT (OUTPATIENT)
Dept: GENERAL RADIOLOGY | Age: 67
End: 2022-04-26
Payer: COMMERCIAL

## 2022-04-26 ENCOUNTER — NURSE ONLY (OUTPATIENT)
Dept: UROLOGY | Age: 67
End: 2022-04-26

## 2022-04-26 VITALS
RESPIRATION RATE: 20 BRPM | HEART RATE: 73 BPM | OXYGEN SATURATION: 98 % | DIASTOLIC BLOOD PRESSURE: 72 MMHG | BODY MASS INDEX: 27.14 KG/M2 | TEMPERATURE: 98.2 F | WEIGHT: 159 LBS | HEIGHT: 64 IN | SYSTOLIC BLOOD PRESSURE: 146 MMHG

## 2022-04-26 DIAGNOSIS — R07.89 ATYPICAL CHEST PAIN: Primary | ICD-10-CM

## 2022-04-26 DIAGNOSIS — K21.9 GASTROESOPHAGEAL REFLUX DISEASE WITHOUT ESOPHAGITIS: ICD-10-CM

## 2022-04-26 DIAGNOSIS — N39.490 URINARY INCONTINENCE, OVERFLOW: Primary | ICD-10-CM

## 2022-04-26 DIAGNOSIS — N39.0 URINARY TRACT INFECTION WITHOUT HEMATURIA, SITE UNSPECIFIED: ICD-10-CM

## 2022-04-26 LAB
ALBUMIN SERPL-MCNC: 4.2 G/DL (ref 3.5–5.1)
ALP BLD-CCNC: 97 U/L (ref 38–126)
ALT SERPL-CCNC: 18 U/L (ref 11–66)
AMPHETAMINE+METHAMPHETAMINE URINE SCREEN: NEGATIVE
ANION GAP SERPL CALCULATED.3IONS-SCNC: 12 MEQ/L (ref 8–16)
APTT: 34.5 SECONDS (ref 22–38)
AST SERPL-CCNC: 15 U/L (ref 5–40)
BACTERIA: ABNORMAL /HPF
BARBITURATE QUANTITATIVE URINE: NEGATIVE
BASOPHILS # BLD: 0.6 %
BASOPHILS ABSOLUTE: 0 THOU/MM3 (ref 0–0.1)
BENZODIAZEPINE QUANTITATIVE URINE: NEGATIVE
BILIRUB SERPL-MCNC: 0.3 MG/DL (ref 0.3–1.2)
BILIRUBIN DIRECT: < 0.2 MG/DL (ref 0–0.3)
BILIRUBIN URINE: NEGATIVE
BLOOD, URINE: ABNORMAL
BUN BLDV-MCNC: 16 MG/DL (ref 7–22)
CALCIUM SERPL-MCNC: 10.5 MG/DL (ref 8.5–10.5)
CANNABINOID QUANTITATIVE URINE: NEGATIVE
CASTS 2: ABNORMAL /LPF
CASTS UA: ABNORMAL /LPF
CHARACTER, URINE: CLEAR
CHLORIDE BLD-SCNC: 101 MEQ/L (ref 98–111)
CO2: 24 MEQ/L (ref 23–33)
COCAINE METABOLITE QUANTITATIVE URINE: POSITIVE
COLOR: YELLOW
CREAT SERPL-MCNC: 0.8 MG/DL (ref 0.4–1.2)
CRYSTALS, UA: ABNORMAL
EKG ATRIAL RATE: 75 BPM
EKG P AXIS: 52 DEGREES
EKG P-R INTERVAL: 156 MS
EKG Q-T INTERVAL: 386 MS
EKG QRS DURATION: 82 MS
EKG QTC CALCULATION (BAZETT): 431 MS
EKG R AXIS: -4 DEGREES
EKG T AXIS: 84 DEGREES
EKG VENTRICULAR RATE: 75 BPM
EOSINOPHIL # BLD: 1.7 %
EOSINOPHILS ABSOLUTE: 0.1 THOU/MM3 (ref 0–0.4)
EPITHELIAL CELLS, UA: ABNORMAL /HPF
ERYTHROCYTE [DISTWIDTH] IN BLOOD BY AUTOMATED COUNT: 14 % (ref 11.5–14.5)
ERYTHROCYTE [DISTWIDTH] IN BLOOD BY AUTOMATED COUNT: 45.1 FL (ref 35–45)
GFR SERPL CREATININE-BSD FRML MDRD: 72 ML/MIN/1.73M2
GLUCOSE BLD-MCNC: 103 MG/DL (ref 70–108)
GLUCOSE URINE: NEGATIVE MG/DL
HCT VFR BLD CALC: 42.2 % (ref 37–47)
HEMOGLOBIN: 13.8 GM/DL (ref 12–16)
IMMATURE GRANS (ABS): 0.03 THOU/MM3 (ref 0–0.07)
IMMATURE GRANULOCYTES: 0.4 %
INR BLD: 1.12 (ref 0.85–1.13)
KETONES, URINE: NEGATIVE
LEUKOCYTE ESTERASE, URINE: ABNORMAL
LIPASE: 161.5 U/L (ref 5.6–51.3)
LYMPHOCYTES # BLD: 39.4 %
LYMPHOCYTES ABSOLUTE: 3.1 THOU/MM3 (ref 1–4.8)
MAGNESIUM: 1.7 MG/DL (ref 1.6–2.4)
MCH RBC QN AUTO: 29.4 PG (ref 26–33)
MCHC RBC AUTO-ENTMCNC: 32.7 GM/DL (ref 32.2–35.5)
MCV RBC AUTO: 89.8 FL (ref 81–99)
MISCELLANEOUS 2: ABNORMAL
MONOCYTES # BLD: 7 %
MONOCYTES ABSOLUTE: 0.5 THOU/MM3 (ref 0.4–1.3)
NITRITE, URINE: NEGATIVE
NUCLEATED RED BLOOD CELLS: 0 /100 WBC
OPIATES, URINE: NEGATIVE
OSMOLALITY CALCULATION: 275.3 MOSMOL/KG (ref 275–300)
OXYCODONE: NEGATIVE
PH UA: 6 (ref 5–9)
PHENCYCLIDINE QUANTITATIVE URINE: NEGATIVE
PLATELET # BLD: 247 THOU/MM3 (ref 130–400)
PMV BLD AUTO: 10.3 FL (ref 9.4–12.4)
POTASSIUM SERPL-SCNC: 3.5 MEQ/L (ref 3.5–5.2)
PRO-BNP: 93.1 PG/ML (ref 0–900)
PROTEIN UA: NEGATIVE
RBC # BLD: 4.7 MILL/MM3 (ref 4.2–5.4)
RBC URINE: ABNORMAL /HPF
RENAL EPITHELIAL, UA: ABNORMAL
SARS-COV-2, NAAT: NOT  DETECTED
SEG NEUTROPHILS: 50.9 %
SEGMENTED NEUTROPHILS ABSOLUTE COUNT: 4 THOU/MM3 (ref 1.8–7.7)
SODIUM BLD-SCNC: 137 MEQ/L (ref 135–145)
SPECIFIC GRAVITY, URINE: 1.02 (ref 1–1.03)
TOTAL PROTEIN: 7.1 G/DL (ref 6.1–8)
TROPONIN T: < 0.01 NG/ML
TROPONIN T: < 0.01 NG/ML
UROBILINOGEN, URINE: 0.2 EU/DL (ref 0–1)
WBC # BLD: 7.8 THOU/MM3 (ref 4.8–10.8)
WBC UA: > 100 /HPF
YEAST: ABNORMAL

## 2022-04-26 PROCEDURE — 87635 SARS-COV-2 COVID-19 AMP PRB: CPT

## 2022-04-26 PROCEDURE — 93005 ELECTROCARDIOGRAM TRACING: CPT | Performed by: EMERGENCY MEDICINE

## 2022-04-26 PROCEDURE — 6360000002 HC RX W HCPCS: Performed by: EMERGENCY MEDICINE

## 2022-04-26 PROCEDURE — 83735 ASSAY OF MAGNESIUM: CPT

## 2022-04-26 PROCEDURE — 87077 CULTURE AEROBIC IDENTIFY: CPT

## 2022-04-26 PROCEDURE — 96374 THER/PROPH/DIAG INJ IV PUSH: CPT

## 2022-04-26 PROCEDURE — 82248 BILIRUBIN DIRECT: CPT

## 2022-04-26 PROCEDURE — 84484 ASSAY OF TROPONIN QUANT: CPT

## 2022-04-26 PROCEDURE — 80053 COMPREHEN METABOLIC PANEL: CPT

## 2022-04-26 PROCEDURE — 99999 PR OFFICE/OUTPT VISIT,PROCEDURE ONLY: CPT | Performed by: NURSE PRACTITIONER

## 2022-04-26 PROCEDURE — 83690 ASSAY OF LIPASE: CPT

## 2022-04-26 PROCEDURE — 81001 URINALYSIS AUTO W/SCOPE: CPT

## 2022-04-26 PROCEDURE — 87186 SC STD MICRODIL/AGAR DIL: CPT

## 2022-04-26 PROCEDURE — 83880 ASSAY OF NATRIURETIC PEPTIDE: CPT

## 2022-04-26 PROCEDURE — 80307 DRUG TEST PRSMV CHEM ANLYZR: CPT

## 2022-04-26 PROCEDURE — 87086 URINE CULTURE/COLONY COUNT: CPT

## 2022-04-26 PROCEDURE — 6370000000 HC RX 637 (ALT 250 FOR IP): Performed by: EMERGENCY MEDICINE

## 2022-04-26 PROCEDURE — 85730 THROMBOPLASTIN TIME PARTIAL: CPT

## 2022-04-26 PROCEDURE — 99285 EMERGENCY DEPT VISIT HI MDM: CPT

## 2022-04-26 PROCEDURE — 85610 PROTHROMBIN TIME: CPT

## 2022-04-26 PROCEDURE — 93010 ELECTROCARDIOGRAM REPORT: CPT | Performed by: INTERNAL MEDICINE

## 2022-04-26 PROCEDURE — 71045 X-RAY EXAM CHEST 1 VIEW: CPT

## 2022-04-26 PROCEDURE — 85025 COMPLETE CBC W/AUTO DIFF WBC: CPT

## 2022-04-26 PROCEDURE — 96372 THER/PROPH/DIAG INJ SC/IM: CPT

## 2022-04-26 RX ORDER — KETOROLAC TROMETHAMINE 30 MG/ML
15 INJECTION, SOLUTION INTRAMUSCULAR; INTRAVENOUS ONCE
Status: COMPLETED | OUTPATIENT
Start: 2022-04-26 | End: 2022-04-26

## 2022-04-26 RX ORDER — PANTOPRAZOLE SODIUM 40 MG/1
40 TABLET, DELAYED RELEASE ORAL ONCE
Status: COMPLETED | OUTPATIENT
Start: 2022-04-26 | End: 2022-04-26

## 2022-04-26 RX ORDER — ONDANSETRON 4 MG/1
4 TABLET, ORALLY DISINTEGRATING ORAL ONCE
Status: COMPLETED | OUTPATIENT
Start: 2022-04-26 | End: 2022-04-26

## 2022-04-26 RX ORDER — GRANULES FOR ORAL 3 G/1
3 POWDER ORAL ONCE
Status: COMPLETED | OUTPATIENT
Start: 2022-04-26 | End: 2022-04-26

## 2022-04-26 RX ORDER — DROPERIDOL 2.5 MG/ML
2.5 INJECTION, SOLUTION INTRAMUSCULAR; INTRAVENOUS ONCE
Status: COMPLETED | OUTPATIENT
Start: 2022-04-26 | End: 2022-04-26

## 2022-04-26 RX ORDER — IBUPROFEN 600 MG/1
600 TABLET ORAL EVERY 6 HOURS PRN
Qty: 40 TABLET | Refills: 0 | Status: ON HOLD | OUTPATIENT
Start: 2022-04-26 | End: 2022-05-09

## 2022-04-26 RX ORDER — SULFAMETHOXAZOLE AND TRIMETHOPRIM 800; 160 MG/1; MG/1
1 TABLET ORAL 2 TIMES DAILY
Qty: 20 TABLET | Refills: 0 | Status: SHIPPED | OUTPATIENT
Start: 2022-04-26 | End: 2022-05-06

## 2022-04-26 RX ORDER — OMEPRAZOLE 40 MG/1
40 CAPSULE, DELAYED RELEASE ORAL
Qty: 30 CAPSULE | Refills: 0 | Status: ON HOLD | OUTPATIENT
Start: 2022-04-26

## 2022-04-26 RX ADMIN — ONDANSETRON 4 MG: 4 TABLET, ORALLY DISINTEGRATING ORAL at 02:48

## 2022-04-26 RX ADMIN — GRANULES FOR ORAL SOLUTION 1 PACKET: 3 POWDER ORAL at 05:44

## 2022-04-26 RX ADMIN — DROPERIDOL 2.5 MG: 2.5 INJECTION, SOLUTION INTRAMUSCULAR; INTRAVENOUS at 05:44

## 2022-04-26 RX ADMIN — LIDOCAINE HYDROCHLORIDE: 20 SOLUTION ORAL; TOPICAL at 02:48

## 2022-04-26 RX ADMIN — KETOROLAC TROMETHAMINE 15 MG: 30 INJECTION, SOLUTION INTRAMUSCULAR; INTRAVENOUS at 03:42

## 2022-04-26 RX ADMIN — PANTOPRAZOLE SODIUM 40 MG: 40 TABLET, DELAYED RELEASE ORAL at 02:48

## 2022-04-26 ASSESSMENT — ENCOUNTER SYMPTOMS
CHOKING: 0
TROUBLE SWALLOWING: 0
PHOTOPHOBIA: 0
CHEST TIGHTNESS: 0
BLOOD IN STOOL: 0
NAUSEA: 0
RHINORRHEA: 0
DIARRHEA: 0
EYE DISCHARGE: 0
ABDOMINAL DISTENTION: 0
VOICE CHANGE: 0
SHORTNESS OF BREATH: 0
CONSTIPATION: 0
COUGH: 0
WHEEZING: 0
EYE ITCHING: 0
VOMITING: 0
BACK PAIN: 0
SINUS PRESSURE: 0
EYE REDNESS: 0
SORE THROAT: 0
EYE PAIN: 0
ABDOMINAL PAIN: 0

## 2022-04-26 ASSESSMENT — PAIN SCALES - GENERAL
PAINLEVEL_OUTOF10: 5
PAINLEVEL_OUTOF10: 7

## 2022-04-26 ASSESSMENT — PAIN - FUNCTIONAL ASSESSMENT
PAIN_FUNCTIONAL_ASSESSMENT: 0-10
PAIN_FUNCTIONAL_ASSESSMENT: 0-10
PAIN_FUNCTIONAL_ASSESSMENT: NONE - DENIES PAIN
PAIN_FUNCTIONAL_ASSESSMENT: 0-10

## 2022-04-26 ASSESSMENT — PAIN DESCRIPTION - LOCATION: LOCATION: CHEST

## 2022-04-26 NOTE — ED NOTES
Pt requesting pain medication,  [address] informed. Pt medicated per [facility], informed on need for urine specimen.  [address] [facility], [address], [facility]  04/26/22 8876

## 2022-04-26 NOTE — ED PROVIDER NOTES
[facility]  eMERGENCY dEPARTMENT eNCOUnter          CHIEF COMPLAINT       Chief Complaint   Patient presents with    Chest Pain       Nurses Notes reviewed and I agree except as noted in the HPI. HISTORY OF PRESENT ILLNESS    Scott Soto is a 61 y.o. female who presents for chest pain. Apparently the patient does have history of some cardiac disease. Last cardiac catheterization showed minimal disease. Patient states she had chest pain starting approximately hour prior to arrival.  Patient was given nitro. States did not help. Patient also has a history of esophagitis and gastritis she also has a history of GERD and an H. pylori infection. Patient states she is on Protonix and she thinks she has been taking it. Patient states that the patient's pain is centrally located without radiation or referral.  She rates it currently a 7 out of 10. Patient is also complaining about pain where staples are out she had a bladder stimulator in which was removed. She is due to see her urologist tomorrow. Patient is otherwise resting comfortably on the cot no apparent distress no other physical complaints at this time. Patient had already been given 4 baby aspirin and 3 nitro. Patient also has a history of drug abuse. Specifically cocaine. I questioned her closely about this she denies using cocaine today. REVIEW OF SYSTEMS     Review of Systems   Constitutional: Negative for activity change, appetite change, diaphoresis, fatigue and unexpected weight change. HENT: Negative for congestion, ear discharge, ear pain, hearing loss, rhinorrhea, sinus pressure, sore throat, trouble swallowing and voice change. Eyes: Negative for photophobia, pain, discharge, redness and itching. Respiratory: Negative for cough, choking, chest tightness, shortness of breath and wheezing. Cardiovascular: Positive for chest pain. Negative for palpitations and leg swelling.    Gastrointestinal: Negative for abdominal distention, abdominal pain, blood in stool, constipation, diarrhea, nausea and vomiting. Endocrine: Negative for polydipsia, polyphagia and polyuria. Genitourinary: Negative for decreased urine volume, difficulty urinating, dysuria, enuresis, frequency, hematuria and urgency. Musculoskeletal: Negative for arthralgias, back pain, gait problem, myalgias, neck pain and neck stiffness. Skin: Negative for pallor and rash. Allergic/Immunologic: Negative for immunocompromised state. Neurological: Negative for dizziness, tremors, seizures, syncope, facial asymmetry, weakness, light-headedness, numbness and headaches. Hematological: Negative for adenopathy. Does not bruise/bleed easily. Psychiatric/Behavioral: Negative for agitation, hallucinations and suicidal ideas. The patient is not nervous/anxious. PAST MEDICAL HISTORY    has a past medical history of Acute renal failure with acute cortical necrosis ([facility] [address] Allergic rhinitis, Arthritis, Bipolar 1 disorder ([facility] [address] Blood circulation, collateral, Cancer ([facility] [address] Colon polyps, COPD (chronic obstructive pulmonary disease) (HCC), Coronary vasospasm (HCC), Depression, Diverticulitis of colon, GERD (gastroesophageal reflux disease), Hiatal hernia, History of arterial ischemic stroke, History of colonoscopy, History of kidney stones, Hx of blood clots, Hyperlipidemia, Hypertension, Liver disease, Pneumonia, Suicidal thoughts, Thyroid disease, Vomiting, Wears dentures, and Wears glasses. SURGICAL HISTORY      has a past surgical history that includes Mandible fracture surgery (1984); shoulder surgery (2014); Colonoscopy (2011); Dilatation, esophagus; Elbow surgery (Right, 10/7/2004); Rotator cuff repair (2004, 2014); skin biopsy (2006); Cholecystectomy, laparoscopic (6/12/15); Elbow surgery (Bilateral, 2016); Carpal tunnel release (Bilateral, 2016); Hysterectomy (1998); cystoscopy w biopsy of bladder (N/A, 7/13/2020);  Stimulator Surgery (N/A, 11/4/2020); Stimulator Surgery (N/A, 11/23/2020); Abdomen surgery; Cystoscopy (N/A, 2/10/2022); Cardiac surgery; and Stimulator Surgery (N/A, 4/14/2022). CURRENT MEDICATIONS       Discharge Medication List as of 4/26/2022  5:38 AM      CONTINUE these medications which have NOT CHANGED    Details   Pantoprazole Sodium (PROTONIX PO) Take 400 mg by mouthHistorical Med      fluticasone-umeclidin-vilant (TRELEGY ELLIPTA) 100-62.5-25 MCG/INH AEPB Inhale 1 puff into the lungs daily, Disp-60 each, R-5Normal      phenazopyridine (PYRIDIUM) 200 MG tablet Take 1 tablet by mouth 3 times daily as needed for Pain, Disp-9 tablet, R-0Normal      !! QUEtiapine (SEROQUEL) 300 MG tablet Take 300 mg by mouth nightly 2 tabHistorical Med      carvedilol (COREG) 3.125 MG tablet TAKE 1 TABLET BY MOUTH TWICE DAILYHistorical Med      famotidine (PEPCID) 20 MG tablet Take 20 mg by mouth dailyHistorical Med      aspirin 81 MG EC tablet Take 1 tablet by mouth daily, Disp-30 tablet, R-3Normal      nitroGLYCERIN (NITROSTAT) 0.4 MG SL tablet up to max of 3 total doses. If no relief after 1 dose, call 911., Disp-25 tablet, R-3Normal      hydrOXYzine (VISTARIL) 50 MG capsule Take 1 capsule by mouth 3 times daily as needed for Itching or Anxiety, Disp-90 capsule, R-0Normal      topiramate (TOPAMAX) 50 MG tablet Take 1 tablet by mouth 2 times daily, Disp-60 tablet, R-3Normal      sucralfate (CARAFATE) 1 GM tablet Take 1 tablet by mouth 4 times daily, Disp-120 tablet, R-3Normal      !!  QUEtiapine (SEROQUEL) 200 MG tablet Take 200 mg by mouth daily PMHistorical Med      clopidogrel (PLAVIX) 75 MG tablet Take 75 mg by mouth dailyHistorical Med      albuterol (PROVENTIL) (2.5 MG/3ML) 0.083% nebulizer solution Take 3 mLs by nebulization every 6 hours as needed for Wheezing, Disp-120 each, R-0Normal      Roflumilast (DALIRESP) 500 MCG tablet Take 500 mcg by mouth dailyHistorical Med      Tiotropium Bromide Monohydrate (SPIRIVA HANDIHALER IN) Inhale 2 puffs into the lungs dailyHistorical Med      acetaminophen (MAPAP) 325 MG tablet Take by mouth every 6 hours as needed for Pain 2 tabHistorical Med      potassium chloride (KLOR-CON M) 20 MEQ extended release tablet Take by mouth daily 2 tabHistorical Med      levothyroxine (SYNTHROID) 75 MCG tablet Take 1 tablet by mouth daily everyday except  take 150 mcg., Disp-30 tablet, R-0NO PRINT      fluticasone (FLONASE) 50 MCG/ACT nasal spray 1 spray by Each Nostril route 2 times daily, Disp-1 Bottle, R-0NO PRINT      ferrous sulfate 325 (65 Fe) MG tablet Take 1 tablet by mouth 2 times daily, Disp-30 tablet, L-9YG PRINT      folic acid (FOLVITE) 1 MG tablet Take 1 mg by mouth dailyHistorical Med      ARIPiprazole (ABILIFY) 2 MG tablet Take 2 mg by mouth dailyHistorical Med      atorvastatin (LIPITOR) 40 MG tablet Take 40 mg by mouth nightly Historical Med      escitalopram (LEXAPRO) 20 MG tablet Take 30 mg by mouth daily Historical Med      fenofibrate 160 MG tablet Take 160 mg by mouth daily Historical Med      traZODone (DESYREL) 100 MG tablet Take 2 tablets by mouth nightly, Disp-30 tablet, R-0Normal       !! - Potential duplicate medications found. Please discuss with provider. ALLERGIES     is allergic to seasonal.    FAMILY HISTORY     She indicated that her mother is . She indicated that her father is . She indicated that her sister is alive. She indicated that her brother is alive. She indicated that the status of her maternal grandmother is unknown. She indicated that the status of her maternal grandfather is unknown. She indicated that the status of her paternal grandmother is unknown. She indicated that the status of her paternal grandfather is unknown.  She indicated that the status of her maternal uncle is unknown.   family history includes Cancer in her father, mother, and sister; Depression in her father; Diabetes in her maternal grandmother; Early Death in her maternal grandfather; Heart Attack in her sister; Heart Disease in her father, maternal grandfather, maternal grandmother, maternal uncle, mother, paternal grandfather, and paternal grandmother; High Blood Pressure in her mother; High Cholesterol in her father, maternal grandfather, maternal grandmother, maternal uncle, mother, paternal grandfather, and paternal grandmother; Mental Illness in her father; Stroke in her maternal grandfather. SOCIAL HISTORY      reports that she has been smoking cigarettes. She started smoking about 45 years ago. She has a 15.00 pack-year smoking history. She has never used smokeless tobacco. She reports previous drug use. Frequency: 1.00 time per week. Drug: Cocaine. She reports that she does not drink alcohol. PHYSICAL EXAM     INITIAL VITALS:  height is 5' 4\" (1.626 m) and weight is 159 lb (72.1 kg). Her oral temperature is 98.2 °F (36.8 °C). Her blood pressure is 146/72 (abnormal) and her pulse is 73. Her respiration is 20 and oxygen saturation is 98%. Physical Exam  Vitals and nursing note reviewed. Constitutional:       General: She is not in acute distress. Appearance: She is well-developed. She is not diaphoretic. HENT:      Head: Normocephalic and atraumatic. Right Ear: External ear normal.      Left Ear: External ear normal.      Nose: Nose normal.      Mouth/Throat:      Pharynx: No oropharyngeal exudate. Eyes:      General: No scleral icterus. Right eye: No discharge. Left eye: No discharge. Conjunctiva/sclera: Conjunctivae normal.      Pupils: Pupils are equal, round, and reactive to light. Neck:      Thyroid: No thyromegaly. Vascular: No JVD. Trachea: No tracheal deviation. Cardiovascular:      Rate and Rhythm: Normal rate and regular rhythm. Pulses:           Carotid pulses are 2+ on the right side and 2+ on the left side. Radial pulses are 2+ on the right side and 2+ on the left side.         Femoral pulses are 2+ on the right side and 2+ on the left side. Popliteal pulses are 2+ on the right side and 2+ on the left side. Dorsalis pedis pulses are 2+ on the right side and 2+ on the left side. Posterior tibial pulses are 2+ on the right side and 2+ on the left side. Heart sounds: Normal heart sounds, S1 normal and S2 normal. No murmur heard. No friction rub. No gallop. Pulmonary:      Effort: Pulmonary effort is normal.      Breath sounds: Normal breath sounds. No stridor. No decreased breath sounds, wheezing, rhonchi or rales. Chest:      Chest wall: No tenderness. Abdominal:      General: Bowel sounds are normal. There is no distension. Palpations: Abdomen is soft. There is no mass. Tenderness: There is abdominal tenderness in the epigastric area. There is no guarding or rebound. Hernia: No hernia is present. Musculoskeletal:         General: No tenderness. Normal range of motion. Cervical back: Normal range of motion and neck supple. Right lower leg: No edema. Left lower leg: No edema. Lymphadenopathy:      Cervical: No cervical adenopathy. Skin:     General: Skin is warm and dry. Capillary Refill: Capillary refill takes less than 2 seconds. Findings: No bruising, ecchymosis, lesion or rash. Neurological:      General: No focal deficit present. Mental Status: She is alert and oriented to person, place, and time. Cranial Nerves: No cranial nerve deficit. Sensory: No sensory deficit. Motor: No weakness. Coordination: Coordination normal.      Gait: Gait normal.      Deep Tendon Reflexes: Reflexes are normal and symmetric. Reflexes normal.   Psychiatric:         Mood and Affect: Mood normal.         Speech: Speech normal.         Behavior: Behavior normal.         Thought Content:  Thought content normal.         Judgment: Judgment normal.           DIFFERENTIAL DIAGNOSIS:   GERD gastritis gastroenteritis less likely ACS    DIAGNOSTIC RESULTS     EKG: All EKG's are interpreted by the Emergency Department Physician who either signs or Co-signs this chart in the absence of a cardiologist.  Normal sinus rhythm, left axis deviation, ventricular rate of 75 SC interval 156 QRS duration of 82 QT intervals 386 QTC of 431    RADIOLOGY: non-plain film images(s) such as CT, Ultrasound and MRI are read by the radiologist.  XR CHEST PORTABLE   Final Result   Impression:   No acute pathology. This document has been electronically signed by: Kevin Summers MD on    04/26/2022 03:18 AM            LABS:   Labs Reviewed   CBC WITH AUTO DIFFERENTIAL - Abnormal; Notable for the following components:       Result Value    RDW-SD 45.1 (*)     All other components within normal limits   LIPASE - Abnormal; Notable for the following components:    Lipase 161.5 (*)     All other components within normal limits   GLOMERULAR FILTRATION RATE, ESTIMATED - Abnormal; Notable for the following components:    Est, Glom Filt Rate 72 (*)     All other components within normal limits   URINE WITH REFLEXED MICRO - Abnormal; Notable for the following components:    Blood, Urine TRACE (*)     Leukocyte Esterase, Urine MODERATE (*)     All other components within normal limits   COVID-19, RAPID   CULTURE, REFLEXED, URINE    Narrative:     Source: urine, clean catch       Site: clean void          Current Antibiotics: not stated   PROTIME-INR   APTT   BRAIN NATRIURETIC PEPTIDE   BASIC METABOLIC PANEL   HEPATIC FUNCTION PANEL   TROPONIN   MAGNESIUM   ANION GAP   OSMOLALITY   URINE DRUG SCREEN   TROPONIN       EMERGENCY DEPARTMENT COURSE:   Vitals:    Vitals:    04/26/22 0231 04/26/22 0302 04/26/22 0344 04/26/22 0432   BP:  117/70 119/75 (!) 146/72   Pulse:  67 69 73   Resp:  16 18 20   Temp:       TempSrc:       SpO2: 94% 96% 98% 98%   Weight:       Height:         Patient was assessed at bedside labs and imaging were ordered.   Patient was given Zofran Protonix and GI cocktail at bedside. I feel that this is most likely representative of gastritis or GERD. Here today I reviewed the patient's labs. Including 2 sets of negative troponins all within normal limits except for the fact that the patient has what appears to be a urinary tract infection. She was given fosfomycin here. She will be given Bactrim for home. Patient's chest x-ray is negative. 2 negative troponins. At this point I feel it has to do with GERD or gastritis. Although I will schedule the patient for follow-up with the heart clinic here on the second floor on 4/27/2022 at 9 AM.  Patient was given droperidol here for her continued pain. This improved everything. At this point feel the patient be safely discharged home. Patient is discharged in stable condition with instructions for follow-up and return to the emergency room immediately for any new or worsening complaints. Patient has what appears to be atypical chest pain and GERD. Patient is instructed to use the reflux medications as prescribed. She is also instructed to use Tylenol Motrin for pain. Patient also has a urinary tract infection she is instructed to take the antibiotic as prescribed. Patient has been scheduled for follow-up with the chest pain clinic here on the second floor on 4/27/2022 at 9 AM she is instructed to keep this appointment. Patient is instructed to also follow-up with a primary care physician to do so within the next 1 to 2 days. Patient is instructed return to the nearest emergency room immediately for any new or worsening complaints. CRITICAL CARE:   None    CONSULTS:  None    PROCEDURES:  None    FINAL IMPRESSION      1. Atypical chest pain    2. Gastroesophageal reflux disease without esophagitis    3.  Urinary tract infection without hematuria, site unspecified          DISPOSITION/PLAN   Discharge    PATIENT REFERRED TO:  [facility] [id number]  [phone]  Go in 1 day  Keep scheduled appointment on second floor here at hospital at [address], [address]  [address] [id number] [phone]    Call in 2 days        DISCHARGE MEDICATIONS:  Discharge Medication List as of 4/26/2022  5:38 AM      START taking these medications    Details   omeprazole (PRILOSEC) 40 MG delayed release capsule Take 1 capsule by mouth every morning (before breakfast), Disp-30 capsule, R-0Print      sulfamethoxazole-trimethoprim (BACTRIM DS) 800-160 MG per tablet Take 1 tablet by mouth 2 times daily for 10 days, Disp-20 tablet, R-0Print             (Please note that portions of this note were completed with a voice recognition program.  Efforts were made to edit the dictations but occasionally words are mis-transcribed.)    Misael Hussein, [address] [place] [address],   04/26/22 7832

## 2022-04-26 NOTE — PROGRESS NOTES
Staples removed without difficulty. Checked twice to ensure all staples were removed. No incisional gap noted. Steri strips applied. Pt advised leave steri strips on until they fall off. Ok to shower with them on. F/u as scheduled.

## 2022-04-26 NOTE — ED NOTES
Pt medicated per MAR. Updated on POC. No needs expressed at this time.  [facility]  04/26/22 0082

## 2022-04-26 NOTE — ED NOTES
Urine specimen obtained. Pt's O2 weaned to RA, currently 98%. No needs expressed at this time.  [facility]  04/26/22 7952

## 2022-04-26 NOTE — ED TRIAGE NOTES
Pt presents to the ER from home with c/o chest pain. Pt states it started about 2 hours ago. Pt states the pain radiated to her left shoulder. Pt states she had an MI a couple years ago and HTN. Pt received 4 baby aspirin, 1 nitro and zofran in route. Pt states the Nitro helped with her chest pain. Pt was 90% on RA, placed on 2L now 94% Pt is alert and oriented, respirations even and unlabored.  VSS

## 2022-04-29 LAB
ORGANISM: ABNORMAL
URINE CULTURE REFLEX: ABNORMAL

## 2022-05-19 ENCOUNTER — OFFICE VISIT (OUTPATIENT)
Dept: PULMONOLOGY | Age: 67
End: 2022-05-19
Payer: COMMERCIAL

## 2022-05-19 VITALS
TEMPERATURE: 96.9 F | WEIGHT: 165.2 LBS | OXYGEN SATURATION: 98 % | SYSTOLIC BLOOD PRESSURE: 118 MMHG | DIASTOLIC BLOOD PRESSURE: 72 MMHG | HEART RATE: 78 BPM | BODY MASS INDEX: 28.2 KG/M2 | HEIGHT: 64 IN

## 2022-05-19 DIAGNOSIS — J44.9 STAGE 2 MODERATE COPD BY GOLD CLASSIFICATION (HCC): ICD-10-CM

## 2022-05-19 DIAGNOSIS — R06.81 WITNESSED APNEIC SPELLS: ICD-10-CM

## 2022-05-19 DIAGNOSIS — R06.83 SNORING: Primary | ICD-10-CM

## 2022-05-19 DIAGNOSIS — J41.1 BRONCHITIS, CHRONIC, MUCOPURULENT (HCC): ICD-10-CM

## 2022-05-19 DIAGNOSIS — R05.3 CHRONIC COUGH: ICD-10-CM

## 2022-05-19 PROCEDURE — 3023F SPIROM DOC REV: CPT | Performed by: INTERNAL MEDICINE

## 2022-05-19 PROCEDURE — G8419 CALC BMI OUT NRM PARAM NOF/U: HCPCS | Performed by: INTERNAL MEDICINE

## 2022-05-19 PROCEDURE — 4004F PT TOBACCO SCREEN RCVD TLK: CPT | Performed by: INTERNAL MEDICINE

## 2022-05-19 PROCEDURE — 3017F COLORECTAL CA SCREEN DOC REV: CPT | Performed by: INTERNAL MEDICINE

## 2022-05-19 PROCEDURE — 99214 OFFICE O/P EST MOD 30 MIN: CPT | Performed by: INTERNAL MEDICINE

## 2022-05-19 PROCEDURE — G8427 DOCREV CUR MEDS BY ELIG CLIN: HCPCS | Performed by: INTERNAL MEDICINE

## 2022-05-19 RX ORDER — ALBUTEROL SULFATE 90 UG/1
2 AEROSOL, METERED RESPIRATORY (INHALATION) EVERY 6 HOURS PRN
Qty: 18 G | Refills: 3 | Status: ON HOLD | OUTPATIENT
Start: 2022-05-19

## 2022-05-19 ASSESSMENT — ENCOUNTER SYMPTOMS
WHEEZING: 1
COUGH: 1
SHORTNESS OF BREATH: 1
CHEST TIGHTNESS: 0

## 2022-05-19 NOTE — PROGRESS NOTES
Neck Circumference - 15.75    Mallampati - 4    Lung Nodule Screening     [x] Qualifies    [] Does not qualify   [] Declined    [] Completed

## 2022-05-19 NOTE — PATIENT INSTRUCTIONS
Patient Education        Stopping Smoking: Care Instructions  Your Care Instructions     Cigarette smokers crave the nicotine in cigarettes. Giving it up is much harder than simply changing a habit. Your body has to stop craving the nicotine. It is hard to quit, but you can do it. There are many tools that people use to quitsmoking. You may find that combining tools works best for you. There are several steps to quitting. First you get ready to quit. Then you get support to help you. After that, you learn new skills and behaviors to become anonsmoker. For many people, a necessary step is getting and using medicine. Your doctor will help you set up the plan that best meets your needs. You may want to attend a smoking cessation program to help you quit smoking. When you choose a program, look for one that has proven success. Ask your doctor for ideas. You will greatly increase your chances of success if you take medicineas well as get counseling or join a cessation program.  Some of the changes you feel when you first quit tobacco are uncomfortable. Your body will miss the nicotine at first, and you may feel short-tempered and grumpy. You may have trouble sleeping or concentrating. Medicine can help you deal with these symptoms. You may struggle with changing your smoking habits and rituals. The last step is the tricky one: Be prepared for the smoking urge to continue for a time. This is a lot to deal with, but keep at it. You willfeel better. Follow-up care is a key part of your treatment and safety. Be sure to make and go to all appointments, and call your doctor if you are having problems. It's also a good idea to know your test results and keep alist of the medicines you take. How can you care for yourself at home?  Ask your family, friends, and coworkers for support. You have a better chance of quitting if you have help and support.    Join a support group, such as Nicotine Anonymous, for people who are nicotine.  Be prepared to keep trying. Most people are not successful the first few times they try to quit. Do not get mad at yourself if you smoke again. Make a list of things you learned and think about when you want to try again, such as next week, next month, or next year. Where can you learn more? Go to https://[employer] Devicespepiceweb.[employer]. org and sign in to your [employer] account. Enter H589 in the [employer] box to learn more about \"Stopping Smoking: Care Instructions. \"     If you do not have an account, please click on the \"Sign Up Now\" link. Current as of: October 28, 2021               Content Version: 13.2  © 2006-2022 [employer]. Care instructions adapted under license by [facility] ([facility]). If you have questions about a medical condition or this instruction, always ask your healthcare professional. [address] any warranty or liability for your use of this information.

## 2022-05-19 NOTE — PROGRESS NOTES
Subjective:      Patient ID: Arleth Watson is a 61 y.o. female. CC: COPD    HPI     No changes in condition, smoking 1 ppd  Chronic bronchitis   On Trelegy, Daliresp, Albuterol  Comes with spirometry which reveals moderate COPD   Need to quit smoking discussed  Reports that her sister has noted that she stops breathing during her sleep, also that she snores louldy, Jordy Pace reports chronic fatigue       Previous notes  Brought as a new patient to see me but we have seen Jordy Pace in the past for COPD. Unfortunately continues smoking 1/2 to 1 ppd and her disease is progressing, c/o chronic bronchitis with \"wet cough\", purulent sputum production, SOB, chest tightness. Started smoking at age 28 y/o average 1 ppd, lives with her sister and a friend all smoke and quitting is not a thing that they discuss. Last seen in pulm by Huma Bautista CNP 8/2020  Bipolar and uses tobacco to relax  Last screening CT chest 8/2021 RADS 2    Review of Systems   Constitutional: Positive for fatigue. Respiratory: Positive for cough, shortness of breath and wheezing. Negative for chest tightness. Endocrine: Negative. Musculoskeletal: Positive for arthralgias. Psychiatric/Behavioral: The patient is nervous/anxious.            All other systems reviewed and are negative    Lung Nodule Screening     [x] Qualifies    [] Does not qualify   [] Declined   [] Completed  Past Medical History:   Diagnosis Date    Acute renal failure with acute cortical necrosis ([address] 12/21/2019    Allergic rhinitis     Arthritis     spine    Bipolar 1 disorder ([address]     Blood circulation, collateral     Cancer ([address] 2011    cancerous polyps removed - Dr. Leslie Herndon Cataract     Colon polyps     COPD (chronic obstructive pulmonary disease) ([address] 7/24/2014    COPD (chronic obstructive pulmonary disease) (HCC)     Coronary vasospasm ([address] 8/17/2019    Depression     Diverticulitis of colon     GERD (gastroesophageal reflux disease)     Glaucoma  Hearing loss     Hiatal hernia     History of arterial ischemic stroke 7/20/2019    History of colonoscopy 2002    History of kidney stones     Hx of blood clots 6/17/2014    PE and collar bone area after shoulder surgery    Hyperlipidemia     Hypertension     Liver disease     enlarged liver - damaged with alcohol in past but no cirrhosis per patient    Pneumonia 7/24/2014    Sexual problems     Suicidal thoughts     2015 admitted to [facility] from [facility]    Thyroid disease     Urinary incontinence     Vomiting     Wears dentures     Wears glasses      Past Surgical History:   Procedure Laterality Date    ABDOMEN SURGERY      x4 removed cysts and ovary removed    CARDIAC SURGERY      heart caths, no stents    CARPAL TUNNEL RELEASE Bilateral 2016    CHOLECYSTECTOMY, LAPAROSCOPIC  6/12/15    Dr. Jessika Monroy N/A 2/10/2022    CYSTOSCOPY URETHRAL IMPLANT INJECTION performed by Dima Al MD at [address] N/A 7/13/2020    CYSTOSCOPY WITH BLADDER BIOPSIES performed by West Billy MD at [facility], ESOPHAGUS      EGD     [address] Right 10/7/2004    Dr. Suki Reardon Bilateral 2016    decompression   Chana PABONH with [address]    ROTATOR CUFF REPAIR  2004, 2014    right shoulder    SHOULDER SURGERY  2014    right    SKIN BIOPSY  2006    under left eye    STIMULATOR SURGERY N/A 11/4/2020    STAGE 1 INTERSTIM PLACEMENT performed by West Billy MD at [place] [address] 11/23/2020    PLACEMENT OF STAGE II INTERSTIM performed by West Billy MD at [address] N/A 4/14/2022    Removal of Interstim performed by Dima Al MD at [facility] History     Tobacco Use    Smoking status: Current Every Day Smoker     Packs/day: 0.50     Years: 30.00     Pack years: 15.00     Types: Cigarettes Start date: 4/22/1977    Smokeless tobacco: Never Used    Tobacco comment: not ready to quit 5/19/22   Vaping Use    Vaping Use: Never used   Substance Use Topics    Alcohol use: No     Comment: none for 2 years    Drug use: Not Currently     Frequency: 1.0 times per week     Types: Cocaine      Allergies   Allergen Reactions    Seasonal Other (See Comments)     sneezing      Family History   Problem Relation Age of Onset    Cancer Mother     Heart Disease Mother     High Blood Pressure Mother     High Cholesterol Mother     Cancer Father         brain    Depression Father     Heart Disease Father     High Cholesterol Father     Mental Illness Father     Cancer Sister     Heart Attack Sister     Heart Disease Maternal Uncle     High Cholesterol Maternal Uncle     Diabetes Maternal Grandmother     Heart Disease Maternal Grandmother     High Cholesterol Maternal Grandmother     Early Death Maternal Grandfather     Heart Disease Maternal Grandfather     High Cholesterol Maternal Grandfather     Stroke Maternal Grandfather     Heart Disease Paternal Grandmother     High Cholesterol Paternal Grandmother     Heart Disease Paternal Grandfather     High Cholesterol Paternal Grandfather     Colon Cancer Neg Hx     Inflam Bowel Dis Neg Hx      Current Outpatient Medications   Medication Sig Dispense Refill    nabumetone (RELAFEN) 500 MG tablet Take 500 mg by mouth 2 times daily      polyethylene glycol (GLYCOLAX) 17 GM/SCOOP powder Colonoscopy Prep Dispense 238 Gram Bottle.   Use as Directed 238 g 0    omeprazole (PRILOSEC) 40 MG delayed release capsule Take 1 capsule by mouth every morning (before breakfast) 30 capsule 0    Pantoprazole Sodium (PROTONIX PO) Take 40 mg by mouth       fluticasone-umeclidin-vilant (TRELEGY ELLIPTA) 100-62.5-25 MCG/INH AEPB Inhale 1 puff into the lungs daily 60 each 5    phenazopyridine (PYRIDIUM) 200 MG tablet Take 1 tablet by mouth 3 times daily as needed for Pain 9 tablet 0    QUEtiapine (SEROQUEL) 300 MG tablet Take 300 mg by mouth nightly 2 tab      carvedilol (COREG) 3.125 MG tablet TAKE 1 TABLET BY MOUTH TWICE DAILY      famotidine (PEPCID) 20 MG tablet Take 20 mg by mouth daily      aspirin 81 MG EC tablet Take 1 tablet by mouth daily 30 tablet 3    nitroGLYCERIN (NITROSTAT) 0.4 MG SL tablet up to max of 3 total doses. If no relief after 1 dose, call 911. 25 tablet 3    hydrOXYzine (VISTARIL) 50 MG capsule Take 1 capsule by mouth 3 times daily as needed for Itching or Anxiety 90 capsule 0    topiramate (TOPAMAX) 50 MG tablet Take 1 tablet by mouth 2 times daily 60 tablet 3    sucralfate (CARAFATE) 1 GM tablet Take 1 tablet by mouth 4 times daily 120 tablet 3    clopidogrel (PLAVIX) 75 MG tablet Take 75 mg by mouth daily      albuterol (PROVENTIL) (2.5 MG/3ML) 0.083% nebulizer solution Take 3 mLs by nebulization every 6 hours as needed for Wheezing 120 each 0    Roflumilast (DALIRESP) 500 MCG tablet Take 500 mcg by mouth daily      Tiotropium Bromide Monohydrate (SPIRIVA HANDIHALER IN) Inhale 2 puffs into the lungs daily       acetaminophen (MAPAP) 325 MG tablet Take by mouth every 6 hours as needed for Pain 2 tab      potassium chloride (KLOR-CON M) 20 MEQ extended release tablet Take by mouth daily 2 tab      levothyroxine (SYNTHROID) 75 MCG tablet Take 1 tablet by mouth daily everyday except Sunday take 150 mcg.  30 tablet 0    fluticasone (FLONASE) 50 MCG/ACT nasal spray 1 spray by Each Nostril route 2 times daily 1 Bottle 0    ferrous sulfate 325 (65 Fe) MG tablet Take 1 tablet by mouth 2 times daily 30 tablet 0    folic acid (FOLVITE) 1 MG tablet Take 1 mg by mouth daily      ARIPiprazole (ABILIFY) 2 MG tablet Take 2 mg by mouth daily      atorvastatin (LIPITOR) 40 MG tablet Take 40 mg by mouth nightly       escitalopram (LEXAPRO) 20 MG tablet Take 30 mg by mouth daily       fenofibrate 160 MG tablet Take 160 mg by mouth daily       traZODone (DESYREL) 100 MG tablet Take 2 tablets by mouth nightly (Patient taking differently: Take 150 mg by mouth nightly 2 tabs prn) 30 tablet 0    ibuprofen (IBU) 600 MG tablet Take 1 tablet by mouth every 6 hours as needed for Pain 40 tablet 0     No current facility-administered medications for this visit. LABS - none   /72 (Site: Left Upper Arm, Position: Sitting)   Pulse 78   Temp 96.9 °F (36.1 °C)   Ht 5' 4\" (1.626 m)   Wt 165 lb 3.2 oz (74.9 kg)   SpO2 98%   BMI 28.36 kg/m²    Wt Readings from Last 3 Encounters:   05/19/22 165 lb 3.2 oz (74.9 kg)   05/09/22 161 lb (73 kg)   04/26/22 159 lb (72.1 kg)     Neck Circumference - 15.75 in; Mallampati Score - 2      Objective:   Physical Exam  Constitutional:       Appearance: She is normal weight. Comments: Older than stated age   HENT:      Head: Atraumatic. Nose: Nose normal.      Mouth/Throat:      Comments: Raspy voice  Cardiovascular:      Rate and Rhythm: Normal rate and regular rhythm. Pulses: Normal pulses. Heart sounds: Normal heart sounds. Pulmonary:      Effort: Pulmonary effort is normal.      Breath sounds: Wheezing and rhonchi present. Abdominal:      Palpations: Abdomen is soft. Musculoskeletal:      Cervical back: Normal range of motion. Skin:     General: Skin is warm. PFTs:            Assessment:       Diagnosis Orders   1. Snoring  [address] [place] [address]   2. Witnessed apneic spells  [address] [place] [address]   3. Stage 2 moderate COPD by GOLD classification ([address]  [address] [place] [address]   4. Bronchitis, chronic, mucopurulent ([address]  [address] [place] [address]   5.  Chronic cough  [address]:      Orders Placed This Encounter   Procedures   [address] [place] [address]     Referral Priority:   Routine     Referral Type:   Eval and Treat     Referral Reason:   Specialty Services Required     Requested Specialty:   Sleep Center     Number of Visits Requested:   1      Orders Placed This Encounter   Medications    Roflumilast (DALIRESP) 500 MCG tablet     Sig: Take 1 tablet by mouth daily     Dispense:  30 tablet     Refill:  5    albuterol sulfate HFA (PROVENTIL HFA) 108 (90 Base) MCG/ACT inhaler     Sig: Inhale 2 puffs into the lungs every 6 hours as needed for Wheezing     Dispense:  18 g     Refill:  3          Patient was counseled on tobacco cessation. Based upon patient's motivation to change her behavior, the following plan was agreed upon: patient will try the following tobacco cessation strategies:  tobacco cessation classes/support groups. She was provided with a list of local tobacco cessation resources. Provider spent 5 minutes counseling patient.    RTC 6 mos

## 2022-05-25 NOTE — TELEPHONE ENCOUNTER
Calling pt back at this time like she requested for smoking cessation program.  Pt states she is still not interested at this time. I asked pt if she just wants to call us when ready/interested and pt stated \"can you call me back in 1 month\". I told pt we could call her back in 1 month like she requests.

## 2022-06-16 ENCOUNTER — INITIAL CONSULT (OUTPATIENT)
Dept: PULMONOLOGY | Age: 67
End: 2022-06-16
Payer: COMMERCIAL

## 2022-06-16 VITALS
HEIGHT: 64 IN | HEART RATE: 75 BPM | BODY MASS INDEX: 27.66 KG/M2 | TEMPERATURE: 97.8 F | WEIGHT: 162 LBS | SYSTOLIC BLOOD PRESSURE: 118 MMHG | DIASTOLIC BLOOD PRESSURE: 68 MMHG | OXYGEN SATURATION: 98 %

## 2022-06-16 DIAGNOSIS — R53.82 CHRONIC FATIGUE: ICD-10-CM

## 2022-06-16 DIAGNOSIS — R06.83 SNORING: Primary | ICD-10-CM

## 2022-06-16 DIAGNOSIS — R29.818 SUSPECTED SLEEP APNEA: ICD-10-CM

## 2022-06-16 DIAGNOSIS — R40.0 DAYTIME SLEEPINESS: ICD-10-CM

## 2022-06-16 DIAGNOSIS — R06.81 WITNESSED APNEIC SPELLS: ICD-10-CM

## 2022-06-16 PROCEDURE — 99214 OFFICE O/P EST MOD 30 MIN: CPT | Performed by: NURSE PRACTITIONER

## 2022-06-16 PROCEDURE — G8419 CALC BMI OUT NRM PARAM NOF/U: HCPCS | Performed by: NURSE PRACTITIONER

## 2022-06-16 PROCEDURE — 3017F COLORECTAL CA SCREEN DOC REV: CPT | Performed by: NURSE PRACTITIONER

## 2022-06-16 PROCEDURE — 4004F PT TOBACCO SCREEN RCVD TLK: CPT | Performed by: NURSE PRACTITIONER

## 2022-06-16 PROCEDURE — G8427 DOCREV CUR MEDS BY ELIG CLIN: HCPCS | Performed by: NURSE PRACTITIONER

## 2022-06-16 ASSESSMENT — ENCOUNTER SYMPTOMS
ALLERGIC/IMMUNOLOGIC NEGATIVE: 1
SHORTNESS OF BREATH: 0
COUGH: 0
WHEEZING: 0
EYES NEGATIVE: 1
GASTROINTESTINAL NEGATIVE: 1
RESPIRATORY NEGATIVE: 1

## 2022-06-16 NOTE — PROGRESS NOTES
[facility]    Patient: Reinaldo Rhodes, 61 y.o.   : 1955    Pt of Dr. Bam Mariscal   Patient presents with    New Patient     New Sleep patient, Ref by Dr. Sukumar Ferris, no prior sleep studies         HPI  Javi Perez is here for ongoing sleep issues. Symptoms include snoring. Javi Perez does not have improvement in symptoms. Last office visit with Dr. Sukumar Ferris regarding her COPD her sister had noted that Rhunette Samples snores very loudly and that she stops breathing during her sleep. Patient has woken herself up gasping and choking   Rhunette Samples reports chronic fatigue   Newer Moderate COPD dx  Continues to smoke   Patient on Trazodone 150 mg po nightly   Seroquel 300 mg PO bedtime   No home oxygen supplementation   Sleeps in a recliner at home     Obstructive Sleep Apnea Review Of Systems:     Sleep History:  Dryness of mouth in the morning:Yes  Falls asleep in 10- 15 minutes. Any awakenings? Yes  Difficulty Falling back to sleep? sometimes  Symptoms began:  several years ago. Time in Bed:   Bedtime: 9p.m. to 3 AM  Awakens  7 a.m. - 11 AM    Congress Sleepiness Score: 0-3  Sitting and reading: 3  Watching TV: 3  Sitting inactive in a public place: 1  Being a passenger in a motor vehicle for an hour or more: 3  Lying down in the afternoon: 2  Sitting and talking to someone: 2  Sitting quietly after lunch (no alcohol): 2  Stopped for a few minutes in traffic while driving;0  Total Score:   16    Naps:  Any naps? Yes and are they helpful sometimes    Dreams:  Any recurring dreams? Yes  Hallucinations? Yes  Sleep Paralysis? Yes  Symptoms of Cataplexy? Sometimes     Bruxism: Yes- even with her dentures   History of head injury: Yes- roller skating accident as a child     Driving History:  Do you have a CDL or drive long distances for work? No  Any driving accidents in the past year? No  Any sleepiness while driving?  No    Weight:  Any change in weight over the past year? Yes   How about past 5 years? Yes  How much? 30 lbs     Sleep Hx     Sleep symptoms:  Yes No  Additional Comments   Snoring [x] []    Witness Apneas [x] []        Waking snorting/gasping [x] []     Nocturia [x]    []     Legs kicking at night [x] []     AM Headaches [x] []  at times with hx of migraines   Non-restorative sleep [x] []     Daytime sleepiness/fatigue [x] []     Daytime napping [x] []  at times, sometimes feels rested. Around 15-60 minutes    Drowsiness while driving [] [x]  patient does not drive    Memory issues [x] []     Decreased concentration [x] []     Cataplexy [x] []     Hypnogogic hallucinations [x] []  feels like something is watching her or things move    Sleep paralysis [x] []     Other [] []        Symptoms began roughly 20 years ago       Previous evaluation and treatment has included-none      DOT/CDL - no  FAA/'s license - no     Previous Report(s) Reviewed: historical medical records, office notes and referral letter/letters      [place] -  20  [place] - 52  Caffeine Intake - does drink much caffeine. When she does it is usually 1500mL of tea a day (in small [facility] 500mL blue pitcher)  3-4 cans of pop per day.       Download     -No downloads      Neck Size: 16.0  Mallampati Mallampati 3  ESS:  20  SAQLI: 52      Past Medical hx     PMH:  Past Medical History:   Diagnosis Date    Acute renal failure with acute cortical necrosis (HCC) 12/21/2019    Allergic rhinitis     Arthritis     spine    Bipolar 1 disorder ([address]     Blood circulation, collateral     Cancer ([facility] 2011    cancerous polyps removed - Dr. Lion Figueroa Cataract     Colon polyps     COPD (chronic obstructive pulmonary disease) ([address] 7/24/2014    COPD (chronic obstructive pulmonary disease) (HCC)     Coronary vasospasm ([address] 8/17/2019    Depression     Diverticulitis of colon     GERD (gastroesophageal reflux disease)     Glaucoma     Hearing loss     Hiatal hernia     History of arterial ischemic stroke 7/20/2019    History of colonoscopy 2002    History of kidney stones     Hx of blood clots 6/17/2014    PE and collar bone area after shoulder surgery    Hyperlipidemia     Hypertension     Liver disease     enlarged liver - damaged with alcohol in past but no cirrhosis per patient    Pneumonia 7/24/2014    Sexual problems     Suicidal thoughts     2015 [facility]    Thyroid disease     Urinary incontinence     Vomiting     Wears dentures     Wears glasses      SURGICAL HISTORY:  Past Surgical History:   Procedure Laterality Date    ABDOMEN SURGERY      x4 removed cysts and ovary removed    CARDIAC SURGERY      heart caths, no stents    CARPAL TUNNEL RELEASE Bilateral 2016    CHOLECYSTECTOMY, LAPAROSCOPIC  6/12/15    Dr. Almita Palencia N/A 2/10/2022    CYSTOSCOPY URETHRAL IMPLANT INJECTION performed by Ozzy Carpio MD at [address] N/A 7/13/2020    CYSTOSCOPY WITH BLADDER BIOPSIES performed by Garland Ruiz MD at [address] [place] [address], ESOPHAGUS      EGD     [address] Right 10/7/2004    Dr. Siena Hopper Bilateral 2016    decompression    HYSTERECTOMY ([address])  1998    Dr. Tara Hurst with [facility]    ROTATOR CUFF REPAIR  2004, 2014    right shoulder    SHOULDER SURGERY  2014    right    SKIN BIOPSY  2006    under left eye    STIMULATOR SURGERY N/A 11/4/2020    STAGE 1 INTERSTIM PLACEMENT performed by Garland Ruiz MD at [place] [address] 11/23/2020    PLACEMENT OF STAGE II INTERSTIM performed by Garland Ruiz MD at [address] N/A 4/14/2022    Removal of Interstim performed by Ozzy Carpio MD at [address] [place] [address] Road:  Social History     Tobacco Use    Smoking status: Current Every Day Smoker     Packs/day: 0.50     Years: 30.00     Pack years: 15.00     Types: Cigarettes Start date: 4/22/1977    Smokeless tobacco: Never Used    Tobacco comment: not ready to quit 5/19/22   Vaping Use    Vaping Use: Never used   Substance Use Topics    Alcohol use: No     Comment: none for 2 years    Drug use: Not Currently     Frequency: 1.0 times per week     Types: Cocaine     ALLERGIES:  Allergies   Allergen Reactions    Seasonal Other (See Comments)     sneezing     FAMILY HISTORY:  Family History   Problem Relation Age of Onset    Cancer Mother     Heart Disease Mother     High Blood Pressure Mother     High Cholesterol Mother     Cancer Father         brain    Depression Father     Heart Disease Father     High Cholesterol Father     Mental Illness Father     Cancer Sister     Heart Attack Sister     Heart Disease Maternal Uncle     High Cholesterol Maternal Uncle     Diabetes Maternal Grandmother     Heart Disease Maternal Grandmother     High Cholesterol Maternal Grandmother     Early Death Maternal Grandfather     Heart Disease Maternal Grandfather     High Cholesterol Maternal Grandfather     Stroke Maternal Grandfather     Heart Disease Paternal Grandmother     High Cholesterol Paternal Grandmother     Heart Disease Paternal Grandfather     High Cholesterol Paternal Grandfather     Colon Cancer Neg Hx     Inflam Bowel Dis Neg Hx      CURRENT MEDICATIONS:  Current Outpatient Medications   Medication Sig Dispense Refill    Roflumilast (DALIRESP) 500 MCG tablet Take 1 tablet by mouth daily 30 tablet 5    albuterol sulfate HFA (PROVENTIL HFA) 108 (90 Base) MCG/ACT inhaler Inhale 2 puffs into the lungs every 6 hours as needed for Wheezing 18 g 3    nabumetone (RELAFEN) 500 MG tablet Take 500 mg by mouth 2 times daily      polyethylene glycol (GLYCOLAX) 17 GM/SCOOP powder Colonoscopy Prep Dispense 238 Gram Bottle.   Use as Directed 238 g 0    omeprazole (PRILOSEC) 40 MG delayed release capsule Take 1 capsule by mouth every morning (before breakfast) 30 capsule 0    Pantoprazole Sodium (PROTONIX PO) Take 40 mg by mouth       fluticasone-umeclidin-vilant (TRELEGY ELLIPTA) 100-62.5-25 MCG/INH AEPB Inhale 1 puff into the lungs daily 60 each 5    phenazopyridine (PYRIDIUM) 200 MG tablet Take 1 tablet by mouth 3 times daily as needed for Pain 9 tablet 0    QUEtiapine (SEROQUEL) 300 MG tablet Take 300 mg by mouth nightly 2 tab      carvedilol (COREG) 3.125 MG tablet TAKE 1 TABLET BY MOUTH TWICE DAILY      famotidine (PEPCID) 20 MG tablet Take 20 mg by mouth daily      aspirin 81 MG EC tablet Take 1 tablet by mouth daily 30 tablet 3    nitroGLYCERIN (NITROSTAT) 0.4 MG SL tablet up to max of 3 total doses. If no relief after 1 dose, call 911. 25 tablet 3    hydrOXYzine (VISTARIL) 50 MG capsule Take 1 capsule by mouth 3 times daily as needed for Itching or Anxiety 90 capsule 0    topiramate (TOPAMAX) 50 MG tablet Take 1 tablet by mouth 2 times daily 60 tablet 3    sucralfate (CARAFATE) 1 GM tablet Take 1 tablet by mouth 4 times daily 120 tablet 3    clopidogrel (PLAVIX) 75 MG tablet Take 75 mg by mouth daily      albuterol (PROVENTIL) (2.5 MG/3ML) 0.083% nebulizer solution Take 3 mLs by nebulization every 6 hours as needed for Wheezing 120 each 0    acetaminophen (MAPAP) 325 MG tablet Take by mouth every 6 hours as needed for Pain 2 tab      potassium chloride (KLOR-CON M) 20 MEQ extended release tablet Take by mouth daily 2 tab      levothyroxine (SYNTHROID) 75 MCG tablet Take 1 tablet by mouth daily everyday except Sunday take 150 mcg.  30 tablet 0    fluticasone (FLONASE) 50 MCG/ACT nasal spray 1 spray by Each Nostril route 2 times daily 1 Bottle 0    ferrous sulfate 325 (65 Fe) MG tablet Take 1 tablet by mouth 2 times daily 30 tablet 0    folic acid (FOLVITE) 1 MG tablet Take 1 mg by mouth daily      ARIPiprazole (ABILIFY) 2 MG tablet Take 2 mg by mouth daily      atorvastatin (LIPITOR) 40 MG tablet Take 40 mg by mouth nightly       escitalopram (LEXAPRO) 20 MG tablet Take 30 mg by mouth daily       fenofibrate 160 MG tablet Take 160 mg by mouth daily       traZODone (DESYREL) 100 MG tablet Take 2 tablets by mouth nightly (Patient taking differently: Take 150 mg by mouth nightly 2 tabs prn) 30 tablet 0    ibuprofen (IBU) 600 MG tablet Take 1 tablet by mouth every 6 hours as needed for Pain 40 tablet 0     No current facility-administered medications for this visit. Bertram AUGUSTINE   Review of Systems   Constitutional: Positive for fatigue. Negative for chills and fever. HENT: Negative. Negative for congestion. Eyes: Negative. Respiratory: Negative. Negative for cough, shortness of breath and wheezing. Cardiovascular: Negative. Negative for chest pain and leg swelling. Gastrointestinal: Negative. Endocrine: Negative. Genitourinary: Negative. Musculoskeletal: Negative. Allergic/Immunologic: Negative. Neurological: Negative. Hematological: Negative. Psychiatric/Behavioral: Positive for sleep disturbance. Physical exam   /68   Pulse 75   Temp 97.8 °F (36.6 °C)   Ht 5' 4\" (1.626 m)   Wt 162 lb (73.5 kg)   SpO2 98% Comment: r/a  BMI 27.81 kg/m²      Physical Exam  Vitals and nursing note reviewed. Constitutional:       Appearance: She is well-developed. HENT:      Head: Normocephalic and atraumatic. Mouth/Throat:      Comments: MP 3  Eyes:      Conjunctiva/sclera: Conjunctivae normal.      Pupils: Pupils are equal, round, and reactive to light. Neck:      Vascular: No JVD. Cardiovascular:      Rate and Rhythm: Normal rate and regular rhythm. Heart sounds: Normal heart sounds. No murmur heard. No friction rub. No gallop. Pulmonary:      Effort: Pulmonary effort is normal. No respiratory distress. Breath sounds: Normal breath sounds. No wheezing or rales. Abdominal:      General: Bowel sounds are normal.      Palpations: Abdomen is soft.    Musculoskeletal:         General: Normal range of motion. Cervical back: Normal range of motion and neck supple. Skin:     General: Skin is warm and dry. Capillary Refill: Capillary refill takes less than 2 seconds. Neurological:      Mental Status: She is alert and oriented to person, place, and time. Psychiatric:         Behavior: Behavior normal.         Thought Content: Thought content normal.         Cognition and Memory: Memory is impaired. Judgment: Judgment normal.          Sleep Study   -NA    ECHO   07/07/2021   ECHOCARDIOGRAM COMPLETE 2D W DOPPLER W COLOR. Conclusions      Summary   Normal left ventricular size and systolic function. There were no regional wall motion abnormalities. Wall thickness was within normal limits. Ejection fraction was estimated at 60-65%. Doppler parameters were consistent with abnormal left ventricular   relaxation (grade 1 diastolic dysfunction). Severe LVOT obstruction of ~ 5.0 m/s. There was mild aortic regurgitation. The mitral valve structure demonstrated calcification of the posterior   leaflet. Signature      ----------------------------------------------------------------   Electronically signed by Nile Meier MD (Interpreting   physician) on 07/07/2021 at 09:57 AM    Assessment      Diagnosis Orders   1. Snoring  Baseline Diagnostic Sleep Study   2. Witnessed apneic spells  Baseline Diagnostic Sleep Study   3. Daytime sleepiness  Baseline Diagnostic Sleep Study   4. Chronic fatigue  Baseline Diagnostic Sleep Study   5. Suspected sleep apnea  Baseline Diagnostic Sleep Study        Plan   -Will schedule  for nocturnal polysomnogram (Sleep study) with baseline protocol at [facility] sleep lab after discharge from the current hospitalization. Patient to follow with Ms. Wendy Holguin CNP at the sleep clinic at [facility] for Pulmonary disease in 1 week after the sleep study to go over the study reports and further management options.  -Sleep questions completed today Electronically signed by Calton Ahumada, APRN - CNP on 6/16/2022 at 12:25 PM

## 2022-06-29 NOTE — TELEPHONE ENCOUNTER
Calling pt back at this time like she requested for the smoking cessation program.        I spoke with pt and explained our 12 week smoking cessation program and pt is interested in starting the program.  We set up patients first appointment for July 20th at 130pm to be done in the office. Pt notified to come to the Medication Management Department located in [facility] on the first floor of [address] [address]. Pt had no questions at this time.

## 2022-07-20 ENCOUNTER — HOSPITAL ENCOUNTER (OUTPATIENT)
Dept: PHARMACY | Age: 67
Setting detail: THERAPIES SERIES
Discharge: HOME | End: 2022-07-20
Payer: MEDICARE

## 2022-07-20 PROCEDURE — 99212 OFFICE O/P EST SF 10 MIN: CPT

## 2022-07-20 NOTE — PROGRESS NOTES
How soon after you wake up do you smoke your first cigarette?   6-30\"(2)   Do you find it difficult to refrain from smoking in places where it is forbidden, e.g., in [employer], [facility], cinema? No (1 if yes)   Which cigarette would you hate to most give up? all others(0)     How many cigarettes per day do you smoke? 11-20(1)       Do you smoke more frequently during the first hours of waking than during the rest of the day? No (1 if yes)   Do you smoke if you are so ill that you are in bed most of the day? No (1 if yes)    Classification of Dependence:  0-2 Very Low  3-4 Low  5    Moderate  6-7 High  8-10 Very High   Score: 3, I went over the results with the pt. Tobacco use:  Current use:  Type: Regular, #/packs per day: average 12 cigarettes a day,  Age started: 24   Years used: 40  Pack years: 36  How many times in the past have you made a serious attempt to quit smoking? 0, has never tried to quit  The pt has used the following medications in the past to assist with tobacco cessation. Nicotine Patch - Yes, only when in hospital   Nicotine Gum - No   Nicotine Inhaler - No   Nicotine Lozenge - No   Zyban - No   Chantix - No    Second hand smoke exposure:  Where are you exposed to second hand smoke? (x) home, () work, () social events, () vehicle, (x) live with another smoker, () not exposed, () other -   Pt lives with her sister and she smokes. If yes, are they interested in quitting smoking? No  Are they supportive of your quitting smoking? Yes      ASSESSMENT/PLAN:  Patient is ready and motivated to quit smoking. Will start counseling on Wednesday, August 3rd  Quit date set? No. If no, I explained to pt that it is OK to not have a quit date set yet and that it is good to gather information and get a plan together and then set a quit date.      Reviewed options for pharmacological therapy including directions for use, benefits, and possible side effects  Chantix   Bupropion SR      OTC Nicotine replacement patches-   >10 cigarettes/day  - 21 mg patch QAM x 6 weeks  - 14 mg patch QAM x 2 weeks  - 7 mg patch QAM x 2 weeks  - Side effects: sleep disturbance, HA, local skin reaction  -Do not cut patch  -May combine with short acting NRT for breakthrough urges    OTC Nicotine Lozenge  - 4 mg for those who smoke first cigarette within 30 minutes of waking, otherwise 2 mg  - Dissolve 1 lozenge every 1-2 hours when urge to smoke occurs; Max 20/day   - Do not chew or swallow; allow to dissolve slowly (~20 to 30 minutes); minimize swallowing and occasionally move lozenge from one side of the mouth to the other until completely dissolved. - - Do not eat or drink 15 minutes before using or while lozenge is in mouth. Following actions suggested:   Start to identify triggers  Look over pages 1-7 in smoking cessation booklet. Notes/goals for the next week are to look over the information I gave her today. After some discussion the pt is wanting to use nicotine patch and lozenges. The pt would be on 21mg patch and 4mg lozenges. The pts pharmacy is at [facility], and pt states is called [facility]. I told pt the pharmacist will call in pxs for NRT. I told pt if they are covered by her insurance to  the NRT and if not covered by her insurance I told pt we can give her 6 weeks of free patches and for the lozenges she would have to pay out of pocket or call the 1-800-QUIT-NOW line and do their phone counseling to get free lozenges. Pt understood. Pt knows that she will not start the patches till her quit date and pt does not have a quit date set yet. PT did not have any questions at this time. CO not checked due to pt states her ride is waiting and she had to get going. Discussed the following:  Nicotine replacement and pharmacological options      Standard written patient education provided to pt:  Pt given Smoking Cessation education booklet? Yes  Pt given a \"1-800-QUIT-NOW\" card?  Yes  Information sheets on nicotine patch and lozenges      Next appointment: Wednesday, August 3rd    Helena Gravely verbalized understanding of the above information and denied further questions or concerns. Charges dropped? Yes      The total time spent with the pt was 28 minutes.

## 2022-07-21 ENCOUNTER — APPOINTMENT (OUTPATIENT)
Dept: CT IMAGING | Age: 67
End: 2022-07-21
Payer: MEDICARE

## 2022-07-21 ENCOUNTER — APPOINTMENT (OUTPATIENT)
Dept: GENERAL RADIOLOGY | Age: 67
End: 2022-07-21
Payer: MEDICARE

## 2022-07-21 ENCOUNTER — HOSPITAL ENCOUNTER (EMERGENCY)
Age: 67
Discharge: HOME | End: 2022-07-21
Payer: MEDICARE

## 2022-07-21 VITALS
OXYGEN SATURATION: 97 % | HEART RATE: 60 BPM | DIASTOLIC BLOOD PRESSURE: 73 MMHG | BODY MASS INDEX: 26.63 KG/M2 | TEMPERATURE: 97.6 F | RESPIRATION RATE: 16 BRPM | WEIGHT: 156 LBS | HEIGHT: 64 IN | SYSTOLIC BLOOD PRESSURE: 143 MMHG

## 2022-07-21 DIAGNOSIS — N20.1 URETEROLITHIASIS: Primary | ICD-10-CM

## 2022-07-21 DIAGNOSIS — R10.11 ABDOMINAL PAIN, RIGHT UPPER QUADRANT: ICD-10-CM

## 2022-07-21 LAB
ALBUMIN SERPL-MCNC: 4.2 G/DL (ref 3.5–5.1)
ALP BLD-CCNC: 114 U/L (ref 38–126)
ALT SERPL-CCNC: 23 U/L (ref 11–66)
ANION GAP SERPL CALCULATED.3IONS-SCNC: 12 MEQ/L (ref 8–16)
AST SERPL-CCNC: 20 U/L (ref 5–40)
BACTERIA: ABNORMAL /HPF
BASOPHILS # BLD: 0.5 %
BASOPHILS ABSOLUTE: 0 THOU/MM3 (ref 0–0.1)
BILIRUB SERPL-MCNC: 0.5 MG/DL (ref 0.3–1.2)
BILIRUBIN DIRECT: < 0.2 MG/DL (ref 0–0.3)
BILIRUBIN URINE: NEGATIVE
BLOOD, URINE: ABNORMAL
BUN BLDV-MCNC: 12 MG/DL (ref 7–22)
CALCIUM SERPL-MCNC: 10.7 MG/DL (ref 8.5–10.5)
CASTS 2: ABNORMAL /LPF
CASTS UA: ABNORMAL /LPF
CHARACTER, URINE: CLEAR
CHLORIDE BLD-SCNC: 104 MEQ/L (ref 98–111)
CO2: 22 MEQ/L (ref 23–33)
COLOR: YELLOW
CREAT SERPL-MCNC: 0.8 MG/DL (ref 0.4–1.2)
CRYSTALS, UA: ABNORMAL
EKG ATRIAL RATE: 59 BPM
EKG P AXIS: 57 DEGREES
EKG P-R INTERVAL: 154 MS
EKG Q-T INTERVAL: 446 MS
EKG QRS DURATION: 80 MS
EKG QTC CALCULATION (BAZETT): 441 MS
EKG R AXIS: 1 DEGREES
EKG T AXIS: 79 DEGREES
EKG VENTRICULAR RATE: 59 BPM
EOSINOPHIL # BLD: 1.6 %
EOSINOPHILS ABSOLUTE: 0.1 THOU/MM3 (ref 0–0.4)
EPITHELIAL CELLS, UA: ABNORMAL /HPF
ERYTHROCYTE [DISTWIDTH] IN BLOOD BY AUTOMATED COUNT: 13.4 % (ref 11.5–14.5)
ERYTHROCYTE [DISTWIDTH] IN BLOOD BY AUTOMATED COUNT: 43.9 FL (ref 35–45)
GFR SERPL CREATININE-BSD FRML MDRD: 72 ML/MIN/1.73M2
GLUCOSE BLD-MCNC: 133 MG/DL (ref 70–108)
GLUCOSE URINE: NEGATIVE MG/DL
HCT VFR BLD CALC: 45.1 % (ref 37–47)
HEMOGLOBIN: 14.9 GM/DL (ref 12–16)
IMMATURE GRANS (ABS): 0.02 THOU/MM3 (ref 0–0.07)
IMMATURE GRANULOCYTES: 0.3 %
KETONES, URINE: NEGATIVE
LEUKOCYTE ESTERASE, URINE: ABNORMAL
LIPASE: 31.1 U/L (ref 5.6–51.3)
LYMPHOCYTES # BLD: 26.9 %
LYMPHOCYTES ABSOLUTE: 2 THOU/MM3 (ref 1–4.8)
MCH RBC QN AUTO: 29.7 PG (ref 26–33)
MCHC RBC AUTO-ENTMCNC: 33 GM/DL (ref 32.2–35.5)
MCV RBC AUTO: 89.8 FL (ref 81–99)
MISCELLANEOUS 2: ABNORMAL
MONOCYTES # BLD: 6 %
MONOCYTES ABSOLUTE: 0.5 THOU/MM3 (ref 0.4–1.3)
NITRITE, URINE: NEGATIVE
NUCLEATED RED BLOOD CELLS: 0 /100 WBC
OSMOLALITY CALCULATION: 277.4 MOSMOL/KG (ref 275–300)
PH UA: 6 (ref 5–9)
PLATELET # BLD: 287 THOU/MM3 (ref 130–400)
PMV BLD AUTO: 9.7 FL (ref 9.4–12.4)
POTASSIUM REFLEX MAGNESIUM: 4.6 MEQ/L (ref 3.5–5.2)
PRO-BNP: 241 PG/ML (ref 0–900)
PRO-BNP: 241.5 PG/ML (ref 0–900)
PROTEIN UA: NEGATIVE
RBC # BLD: 5.02 MILL/MM3 (ref 4.2–5.4)
RBC URINE: ABNORMAL /HPF
RENAL EPITHELIAL, UA: ABNORMAL
SARS-COV-2, NAAT: NOT  DETECTED
SEG NEUTROPHILS: 64.7 %
SEGMENTED NEUTROPHILS ABSOLUTE COUNT: 4.9 THOU/MM3 (ref 1.8–7.7)
SODIUM BLD-SCNC: 138 MEQ/L (ref 135–145)
SPECIFIC GRAVITY, URINE: > 1.03 (ref 1–1.03)
TOTAL PROTEIN: 7.2 G/DL (ref 6.1–8)
TROPONIN T: < 0.01 NG/ML
UROBILINOGEN, URINE: 0.2 EU/DL (ref 0–1)
WBC # BLD: 7.6 THOU/MM3 (ref 4.8–10.8)
WBC UA: ABNORMAL /HPF
YEAST: ABNORMAL

## 2022-07-21 PROCEDURE — 80048 BASIC METABOLIC PNL TOTAL CA: CPT

## 2022-07-21 PROCEDURE — 6360000002 HC RX W HCPCS: Performed by: PHYSICIAN ASSISTANT

## 2022-07-21 PROCEDURE — 74177 CT ABD & PELVIS W/CONTRAST: CPT

## 2022-07-21 PROCEDURE — 93010 ELECTROCARDIOGRAM REPORT: CPT | Performed by: INTERNAL MEDICINE

## 2022-07-21 PROCEDURE — 87186 SC STD MICRODIL/AGAR DIL: CPT

## 2022-07-21 PROCEDURE — 87086 URINE CULTURE/COLONY COUNT: CPT

## 2022-07-21 PROCEDURE — 80076 HEPATIC FUNCTION PANEL: CPT

## 2022-07-21 PROCEDURE — 84484 ASSAY OF TROPONIN QUANT: CPT

## 2022-07-21 PROCEDURE — 83690 ASSAY OF LIPASE: CPT

## 2022-07-21 PROCEDURE — 96374 THER/PROPH/DIAG INJ IV PUSH: CPT

## 2022-07-21 PROCEDURE — 99285 EMERGENCY DEPT VISIT HI MDM: CPT

## 2022-07-21 PROCEDURE — 87635 SARS-COV-2 COVID-19 AMP PRB: CPT

## 2022-07-21 PROCEDURE — 93005 ELECTROCARDIOGRAM TRACING: CPT | Performed by: PHYSICIAN ASSISTANT

## 2022-07-21 PROCEDURE — 36415 COLL VENOUS BLD VENIPUNCTURE: CPT

## 2022-07-21 PROCEDURE — 83880 ASSAY OF NATRIURETIC PEPTIDE: CPT

## 2022-07-21 PROCEDURE — 96372 THER/PROPH/DIAG INJ SC/IM: CPT

## 2022-07-21 PROCEDURE — 96375 TX/PRO/DX INJ NEW DRUG ADDON: CPT

## 2022-07-21 PROCEDURE — 85025 COMPLETE CBC W/AUTO DIFF WBC: CPT

## 2022-07-21 PROCEDURE — 6360000004 HC RX CONTRAST MEDICATION: Performed by: PHYSICIAN ASSISTANT

## 2022-07-21 PROCEDURE — 81001 URINALYSIS AUTO W/SCOPE: CPT

## 2022-07-21 PROCEDURE — 71045 X-RAY EXAM CHEST 1 VIEW: CPT

## 2022-07-21 PROCEDURE — 87077 CULTURE AEROBIC IDENTIFY: CPT

## 2022-07-21 RX ORDER — ONDANSETRON 2 MG/ML
4 INJECTION INTRAMUSCULAR; INTRAVENOUS ONCE
Status: COMPLETED | OUTPATIENT
Start: 2022-07-21 | End: 2022-07-21

## 2022-07-21 RX ORDER — MORPHINE SULFATE 2 MG/ML
2 INJECTION, SOLUTION INTRAMUSCULAR; INTRAVENOUS
Status: DISCONTINUED | OUTPATIENT
Start: 2022-07-21 | End: 2022-07-21 | Stop reason: HOSPADM

## 2022-07-21 RX ORDER — DICYCLOMINE HYDROCHLORIDE 10 MG/ML
20 INJECTION INTRAMUSCULAR ONCE
Status: COMPLETED | OUTPATIENT
Start: 2022-07-21 | End: 2022-07-21

## 2022-07-21 RX ORDER — HYDROCODONE BITARTRATE AND ACETAMINOPHEN 5; 325 MG/1; MG/1
1 TABLET ORAL EVERY 6 HOURS PRN
Qty: 6 TABLET | Refills: 0 | Status: SHIPPED | OUTPATIENT
Start: 2022-07-21 | End: 2022-07-24

## 2022-07-21 RX ORDER — ONDANSETRON 4 MG/1
4 TABLET, ORALLY DISINTEGRATING ORAL EVERY 8 HOURS PRN
Qty: 10 TABLET | Refills: 0 | Status: SHIPPED | OUTPATIENT
Start: 2022-07-21 | End: 2022-07-26

## 2022-07-21 RX ADMIN — DICYCLOMINE HYDROCHLORIDE 20 MG: 20 INJECTION INTRAMUSCULAR at 14:59

## 2022-07-21 RX ADMIN — ONDANSETRON 4 MG: 2 INJECTION INTRAMUSCULAR; INTRAVENOUS at 13:06

## 2022-07-21 RX ADMIN — MORPHINE SULFATE 2 MG: 2 INJECTION, SOLUTION INTRAMUSCULAR; INTRAVENOUS at 13:06

## 2022-07-21 RX ADMIN — IOPAMIDOL 80 ML: 755 INJECTION, SOLUTION INTRAVENOUS at 13:32

## 2022-07-21 ASSESSMENT — PAIN SCALES - GENERAL: PAINLEVEL_OUTOF10: 9

## 2022-07-21 ASSESSMENT — ENCOUNTER SYMPTOMS
EYES NEGATIVE: 1
RECTAL PAIN: 0
ABDOMINAL PAIN: 1
COUGH: 0
VOMITING: 1
DIARRHEA: 1
NAUSEA: 1
CONSTIPATION: 0
SHORTNESS OF BREATH: 0
ABDOMINAL DISTENTION: 0
SORE THROAT: 0

## 2022-07-21 ASSESSMENT — PAIN - FUNCTIONAL ASSESSMENT: PAIN_FUNCTIONAL_ASSESSMENT: 0-10

## 2022-07-21 ASSESSMENT — PAIN DESCRIPTION - PAIN TYPE: TYPE: ACUTE PAIN

## 2022-07-21 ASSESSMENT — PAIN DESCRIPTION - LOCATION: LOCATION: ABDOMEN

## 2022-07-21 ASSESSMENT — PAIN DESCRIPTION - ORIENTATION: ORIENTATION: RIGHT;LOWER

## 2022-07-21 ASSESSMENT — PAIN DESCRIPTION - DESCRIPTORS: DESCRIPTORS: STABBING;SHARP

## 2022-07-21 NOTE — DISCHARGE INSTRUCTIONS
Pain medicine, zofran for nausea, drink plenty of clear fluids. Follow up with urologist tomorrow for re-evaluation. If worsening symptoms of intractable pain, vomiting, follow up to ED for further evaluation.

## 2022-07-21 NOTE — ED TRIAGE NOTES
Right lower stomach pain started this AM, has had 2 episodes diarrhea and 1 vomiting in last 24 hours. Has a productive cough that has been ongoing.

## 2022-07-21 NOTE — ED PROVIDER NOTES
[address] \[facility]\"" [address] [address] EMERGENCY DEPT    EMERGENCY MEDICINE     Pt Name: Estrella Figueroa  MRN: [id number]  [place] 1955  Date of evaluation: 7/21/2022  Provider: Perfecto Angeles PA-C    CHIEF COMPLAINT     No chief complaint on file. HISTORY OF PRESENT ILLNESS    Estrella Figueroa is a pleasant 74 y.o. female who presents to the emergency department for right-sided abdominal pain. Patient states that around 7:30 AM this morning she woke up with right-sided abdominal pain. She states the pain feels lower, upper right side as well as right flank pain. She says that she has had associated nausea and vomited 1 time this morning secondary to the pain. She also had an episode of diarrhea this morning but states that she does have IBS and this is not uncommon. She has no noticeable blood in vomit, diarrhea. No current issues urinary with dysuria, hematuria. Patient rates her pain 9/10 dull ache with occasional sharp stabbing. Has history of cholecystectomy and hysterectomy. History of COPD, IBS, type 2 diabetes, CHF, CAD. Is a chronic smoker but no current alcohol use. Patient states that she used to drink heavily and then her liver was enlarged so she stopped drinking entirely and has not drank for years. She is currently on Plavix. No other concerns or complaints at this time. Triage notes and Nursing notes were reviewed by myself. Any discrepancies are addressed above.     PAST MEDICAL HISTORY     Past Medical History:   Diagnosis Date    Acute renal failure with acute cortical necrosis (HCC) 12/21/2019    Allergic rhinitis     Arthritis     spine    Bipolar 1 disorder (HCC)     Blood circulation, collateral     Cancer ([address] 2011    cancerous polyps removed - Dr. SANDRA SOLOMON    Cataract     Colon polyps     COPD (chronic obstructive pulmonary disease) ([address] 7/24/2014    COPD (chronic obstructive pulmonary disease) ([address]     Coronary vasospasm ([address] 8/17/2019    Depression     Diverticulitis of colon GERD (gastroesophageal reflux disease)     Glaucoma     Hearing loss     Hiatal hernia     History of arterial ischemic stroke 7/20/2019    History of colonoscopy 2002    History of kidney stones     Hx of blood clots 6/17/2014    PE and collar bone area after shoulder surgery    Hyperlipidemia     Hypertension     Liver disease     enlarged liver - damaged with alcohol in past but no cirrhosis per patient    Pneumonia 7/24/2014    Sexual problems     Suicidal thoughts     2015 admitted to 4E from [facility]    Thyroid disease     Urinary incontinence     Vomiting     Wears dentures     Wears glasses        SURGICAL HISTORY       Past Surgical History:   Procedure Laterality Date    ABDOMEN SURGERY      x4 removed cysts and ovary removed    CARDIAC SURGERY      heart caths, no stents    CARPAL TUNNEL RELEASE Bilateral 2016    CHOLECYSTECTOMY, LAPAROSCOPIC  6/12/15    Dr. Bing Joseph N/A 2/10/2022    CYSTOSCOPY URETHRAL IMPLANT INJECTION performed by Anibal Pickard MD at [address] N/A 7/13/2020    CYSTOSCOPY WITH BLADDER BIOPSIES performed by Edgardo Patrick MD at [facility], ESOPHAGUS      EGD      [facility] Right 10/7/2004    Dr. Liya Heredia Bilateral 2016    decompression    HYSTERECTOMY ([facility])  1998    Dr. Toney Chan with [facility] [address]  2004, 2014    right shoulder    SHOULDER SURGERY  2014    right    SKIN BIOPSY  2006    under left eye    STIMULATOR SURGERY N/A 11/4/2020    STAGE 1 INTERSTIM PLACEMENT performed by Edgardo Patrick MD at [address] [place] [address] 11/23/2020    PLACEMENT OF STAGE II INTERSTIM performed by Edgardo Patrick MD at [address] [place] [address] 4/14/2022    Removal of Interstim performed by Anibal Pickard MD at [address] [facility]       Discharge Medication List as of 7/21/2022  3:27 PM CONTINUE these medications which have NOT CHANGED    Details   Roflumilast (DALIRESP) 500 MCG tablet Take 1 tablet by mouth daily, Disp-30 tablet, R-5Normal      albuterol sulfate HFA (PROVENTIL HFA) 108 (90 Base) MCG/ACT inhaler Inhale 2 puffs into the lungs every 6 hours as needed for Wheezing, Disp-18 g, R-3Normal      nabumetone (RELAFEN) 500 MG tablet Take 500 mg by mouth 2 times dailyHistorical Med      polyethylene glycol (GLYCOLAX) 17 GM/SCOOP powder Colonoscopy Prep Dispense 238 Gram Bottle. Use as Directed, Disp-238 g, R-0Normal      omeprazole (PRILOSEC) 40 MG delayed release capsule Take 1 capsule by mouth every morning (before breakfast), Disp-30 capsule, R-0Print      ibuprofen (IBU) 600 MG tablet Take 1 tablet by mouth every 6 hours as needed for Pain, Disp-40 tablet, R-0Print      Pantoprazole Sodium (PROTONIX PO) Take 40 mg by mouth Historical Med      fluticasone-umeclidin-vilant (TRELEGY ELLIPTA) 100-62.5-25 MCG/INH AEPB Inhale 1 puff into the lungs daily, Disp-60 each, R-5Normal      phenazopyridine (PYRIDIUM) 200 MG tablet Take 1 tablet by mouth 3 times daily as needed for Pain, Disp-9 tablet, R-0Normal      QUEtiapine (SEROQUEL) 300 MG tablet Take 300 mg by mouth nightly 2 tabHistorical Med      carvedilol (COREG) 3.125 MG tablet TAKE 1 TABLET BY MOUTH TWICE DAILYHistorical Med      famotidine (PEPCID) 20 MG tablet Take 20 mg by mouth dailyHistorical Med      aspirin 81 MG EC tablet Take 1 tablet by mouth daily, Disp-30 tablet, R-3Normal      nitroGLYCERIN (NITROSTAT) 0.4 MG SL tablet up to max of 3 total doses.  If no relief after 1 dose, call 911., Disp-25 tablet, R-3Normal      hydrOXYzine (VISTARIL) 50 MG capsule Take 1 capsule by mouth 3 times daily as needed for Itching or Anxiety, Disp-90 capsule, R-0Normal      topiramate (TOPAMAX) 50 MG tablet Take 1 tablet by mouth 2 times daily, Disp-60 tablet, R-3Normal      sucralfate (CARAFATE) 1 GM tablet Take 1 tablet by mouth 4 times daily, Disp-120 tablet, R-3Normal      clopidogrel (PLAVIX) 75 MG tablet Take 75 mg by mouth dailyHistorical Med      albuterol (PROVENTIL) (2.5 MG/3ML) 0.083% nebulizer solution Take 3 mLs by nebulization every 6 hours as needed for Wheezing, Disp-120 each, R-0Normal      acetaminophen (MAPAP) 325 MG tablet Take by mouth every 6 hours as needed for Pain 2 tabHistorical Med      potassium chloride (KLOR-CON M) 20 MEQ extended release tablet Take by mouth daily 2 tabHistorical Med      levothyroxine (SYNTHROID) 75 MCG tablet Take 1 tablet by mouth daily everyday except Sunday take 150 mcg., Disp-30 tablet, R-0NO PRINT      fluticasone (FLONASE) 50 MCG/ACT nasal spray 1 spray by Each Nostril route 2 times daily, Disp-1 Bottle, R-0NO PRINT      ferrous sulfate 325 (65 Fe) MG tablet Take 1 tablet by mouth 2 times daily, Disp-30 tablet, O-8QQ PRINT      folic acid (FOLVITE) 1 MG tablet Take 1 mg by mouth dailyHistorical Med      ARIPiprazole (ABILIFY) 2 MG tablet Take 2 mg by mouth dailyHistorical Med      atorvastatin (LIPITOR) 40 MG tablet Take 40 mg by mouth nightly Historical Med      escitalopram (LEXAPRO) 20 MG tablet Take 30 mg by mouth daily Historical Med      fenofibrate 160 MG tablet Take 160 mg by mouth daily Historical Med      traZODone (DESYREL) 100 MG tablet Take 2 tablets by mouth nightly, Disp-30 tablet, R-0Normal             ALLERGIES     Seasonal    FAMILY HISTORY       Family History   Problem Relation Age of Onset    Cancer Mother     Heart Disease Mother     High Blood Pressure Mother     High Cholesterol Mother     Cancer Father         brain    Depression Father     Heart Disease Father     High Cholesterol Father     Mental Illness Father     Cancer Sister     Heart Attack Sister     Heart Disease Maternal Uncle     High Cholesterol Maternal Uncle     Diabetes Maternal Grandmother     Heart Disease Maternal Grandmother     High Cholesterol Maternal Grandmother     Early Death Maternal Grandfather PHYSICAL EXAM     INITIAL VITALS:  height is 5' 4\" (1.626 m) and weight is 156 lb (70.8 kg). Her oral temperature is 97.6 °F (36.4 °C). Her blood pressure is 143/73 (abnormal) and her pulse is 60. Her respiration is 16 and oxygen saturation is 97%. Physical Exam  Vitals and nursing note reviewed. Exam conducted with a chaperone present. Constitutional:       General: She is in acute distress. Appearance: Normal appearance. She is normal weight. She is not ill-appearing, toxic-appearing or diaphoretic. HENT:      Head: Normocephalic and atraumatic. Right Ear: External ear normal.      Left Ear: External ear normal.      Nose: Nose normal.      Mouth/Throat:      Mouth: Mucous membranes are moist.   Eyes:      Extraocular Movements: Extraocular movements intact. Pupils: Pupils are equal, round, and reactive to light. Cardiovascular:      Rate and Rhythm: Normal rate and regular rhythm. Pulses: Normal pulses. Heart sounds: Normal heart sounds. No murmur heard. Pulmonary:      Effort: Pulmonary effort is normal. No respiratory distress. Breath sounds: Normal breath sounds. Abdominal:      General: Bowel sounds are normal. There is no distension. Palpations: Abdomen is soft. There is no mass. Tenderness: There is abdominal tenderness in the right upper quadrant and right lower quadrant. There is right CVA tenderness. There is no left CVA tenderness, guarding or rebound. Positive signs include McBurney's sign. Negative signs include Chaidez's sign, Rovsing's sign, psoas sign and obturator sign. Hernia: No hernia is present. Musculoskeletal:         General: Normal range of motion. Cervical back: Normal range of motion and neck supple. Skin:     General: Skin is warm and dry. Capillary Refill: Capillary refill takes less than 2 seconds. Neurological:      Mental Status: She is alert and oriented to person, place, and time.       GCS: GCS eye subscore is 4. GCS verbal subscore is 5. GCS motor subscore is 6. Psychiatric:         Mood and Affect: Mood normal.         Behavior: Behavior normal.         Thought Content: Thought content normal.       DIFFERENTIAL DIAGNOSIS:   Differential diagnoses are discussed    DIAGNOSTIC RESULTS     EKG:(none if blank)  All EKGs are interpreted by the Emergency Department Physician who either signs or Co-signs this chart in the absence of a cardiologist.    0 Result Notes    Component Ref Range & Units 7/21/22 1304 4/26/22 0215 11/3/21 0730 2/20/21 2053 2/20/21 1613 11/9/20 2150 5/30/20 2235   Ventricular Rate BPM 59  75  70  66  69  55  69    Atrial Rate BPM 59  75  70  66  69  55  69    P-R Interval ms 154  156  152  144  136  144  140    QRS Duration ms 80  82  84  86  80  84  86    Q-T Interval ms 446  386  400  424  414  460  428    QTc Calculation (Bazett) ms 441  431  432  444  443  440  458    P Axis degrees 57  52  63  62  59  64  63    R Axis degrees 1  -4  18  6  16  10  8    T Axis degrees 79  84  85  32  61  54  60    Resulting Agency  [address]             Narrative & Impression    Sinus bradycardia  Possible Left atrial enlargement  Cannot rule out Anterior infarct (cited on or before 23-APR-2018)  Inferior infarct , age undetermined  Abnormal ECG  When compared with ECG of 26-APR-2022 02:15,  No significant change               RADIOLOGY: (none if blank)   I directly visualized the following images and reviewed the radiologist interpretations. Interpretation per the Radiologist below, if available at the time of this note:  CT ABDOMEN PELVIS W IV CONTRAST Additional Contrast? None   Final Result   1. Normal appendix. No bowel obstruction. No acute findings. 2. Multiple stable chronic findings are discussed. **This report has been created using voice recognition software.   It may contain minor errors which are inherent in voice recognition technology. **      Final report electronically signed by Dr Aleyda Melara on 7/21/2022 1:56 PM      XR CHEST PORTABLE   Final Result   1. Normal heart size. Accessory azygos fissure right apex. 2. No acute findings. No infiltrates or effusions are seen. **This report has been created using voice recognition software. It may contain minor errors which are inherent in voice recognition technology. **      Final report electronically signed by Dr. Eder Garcia on 7/21/2022 10:41 AM          LABS:   Labs Reviewed   CULTURE, REFLEXED, URINE - Abnormal; Notable for the following components:       Result Value    Organism Proteus species (*)     All other components within normal limits    Narrative:     Source: urine, clean catch       Site: clean void          Current Antibiotics: not stated   BASIC METABOLIC PANEL W/ REFLEX TO MG FOR LOW K - Abnormal; Notable for the following components:    CO2 22 (*)     Glucose 133 (*)     Calcium 10.7 (*)     All other components within normal limits   GLOMERULAR FILTRATION RATE, ESTIMATED - Abnormal; Notable for the following components:    Est, Glom Filt Rate 72 (*)     All other components within normal limits   URINE WITH REFLEXED MICRO - Abnormal; Notable for the following components:    Specific Gravity, Urine > 1.030 (*)     Blood, Urine SMALL (*)     Leukocyte Esterase, Urine MODERATE (*)     All other components within normal limits   COVID-19, RAPID   CBC WITH AUTO DIFFERENTIAL   HEPATIC FUNCTION PANEL   LIPASE   TROPONIN   BRAIN NATRIURETIC PEPTIDE   BRAIN NATRIURETIC PEPTIDE   ANION GAP   OSMOLALITY       All other labs were within normal range or not returned as of this dictation. Please note, any cultures that may have been sent were not resulted at the time of this patient visit.     EMERGENCY DEPARTMENT COURSE:   Vitals:    Vitals:    07/21/22 1008   BP: (!) 143/73   Pulse: 60   Resp: 16   Temp: 97.6 °F (36.4 °C)   TempSrc: Oral   SpO2: 97%   Weight: 156 lb (70.8 kg)   Height: 5' 4\" (1.626 m)     10:50 AM EDT: The patient was seen and evaluated. PROCEDURES: (None if blank)  Procedures         ED Medications administered this visit:    Medications   ondansetron (ZOFRAN) injection 4 mg (4 mg IntraVENous Given 7/21/22 1306)   iopamidol (ISOVUE-370) 76 % injection 80 mL (80 mLs IntraVENous Given 7/21/22 1332)   dicyclomine (BENTYL) injection 20 mg (20 mg IntraMUSCular Given 7/21/22 2959)       MDM:  Patient is 62-year-old female who came to the ED to be evaluated for right sided abdominal and flank pain. Appropriate testing/imaging of CBC, BMP, hepatic, lipase, troponin, BNP, urinalysis, EKG, chest x-ray, rapid COVID was done based on the patient's initial complaints, history, and physical exam.   Pertinent results were none. Moderate leukocytes noted in urinalysis but no bacteria. Negative nitrites. Will follow urine culture for possible infection and treat as needed based on findings. Discussed findings with patient. Patient was given medication for nausea and pain which did mildly improve patient's symptoms. Recommend at this point CT abdomen/pelvis with and without Discharge hometrast to rule out kidney stone versus appendicitis. CT results show no acute findings. Case was discussed with ED attending Dr. Brandon Banda who after reviewing the CT personally noted a small 2 mm ureterolithiasis right side. This would match up with patient's acute pain and symptoms. Discussed findings with patient. At this point patient may be discharged home from the ED with pain and symptoms tolerable. Will prescribe pain medicine and Zofran to pharmacy which patient will  today. Recommend follow-up with patient's urologist tomorrow for reevaluation of the kidney stone pain. If worsening symptoms of abdominal pain, fevers, fatigue may follow-up to the ED for reevaluation discuss possible.      Patient was seen independently by myself. The patient's final impression and disposition and plan was determined by myself. Strict return precautions and follow up instructions were discussed with the patient prior to discharge, with which the patient agrees. Physical assessment findings, diagnostic testing(s) if applicable, and vital signs reviewed with patient/patient representative. Questions answered. Medications as directed, including OTC medications for supportive care. Education provided on medications. Differential diagnosis(s) discussed with patient/patient representative. Home care/self care instructions reviewed with patient/patient representative. Patient is to go to the emergency department if symptoms worsen. Patient/patient representative is aware of care plan, questions answered, verbalizes understanding and is in agreement. CRITICAL CARE:   None    CONSULTS:  None    PROCEDURES:  None    FINAL IMPRESSION      1. Ureterolithiasis    2. Abdominal pain, right upper quadrant          DISPOSITION/PLAN   Discharge home    PATIENT REFERRED TO:  Antonieta Alvarado MD  [address] [facility] [place] [id number] [phone]    In 1 day  For re-evaluation    [facility] [address] EMERGENCY DEPT  [address]  [phone]  In 2 days  If symptoms worsen    DISCHARGEMEDICATIONS:  Discharge Medication List as of 7/21/2022  3:27 PM        START taking these medications    Details   HYDROcodone-acetaminophen (NORCO) 5-325 MG per tablet Take 1 tablet by mouth every 6 hours as needed for Pain for up to 3 days. Intended supply: 3 days.  Take lowest dose possible to manage pain, Disp-6 tablet, R-0Normal      ondansetron (ZOFRAN ODT) 4 MG disintegrating tablet Take 1 tablet by mouth every 8 hours as needed for Nausea, Disp-10 tablet, R-0Normal                  (Please note that portions of this note were completed with a voice recognition program.  Efforts were made to edit the dictations but occasionallywords are mis-transcribed.)      Matias Uriarte PA-C(electronically signed)  Physician Associate, Emergency Department        Matias Uriarte PA-C  07/22/22 3232

## 2022-07-22 RX ORDER — NITROFURANTOIN 25; 75 MG/1; MG/1
100 CAPSULE ORAL 2 TIMES DAILY
Qty: 10 CAPSULE | Refills: 0 | Status: SHIPPED | OUTPATIENT
Start: 2022-07-22 | End: 2022-07-27

## 2022-07-22 RX ORDER — POLYETHYLENE GLYCOL 3350 17 G
4 POWDER IN PACKET (EA) ORAL PRN
Qty: 100 EACH | Refills: 3 | Status: ON HOLD | OUTPATIENT
Start: 2022-07-22

## 2022-07-22 RX ORDER — NICOTINE 21 MG/24HR
1 PATCH, TRANSDERMAL 24 HOURS TRANSDERMAL DAILY
Qty: 42 PATCH | Refills: 0 | Status: ON HOLD | OUTPATIENT
Start: 2022-07-22 | End: 2022-09-02

## 2022-07-22 NOTE — PROGRESS NOTES
Chart reviewed. Rx for Nicotine patch and lozenge sent to pharmacy.     Casimiro Rodriguez, PharmD, BCPS, CACP, CTTS 7/22/2022 12:17 PM

## 2022-07-23 LAB
ORGANISM: ABNORMAL
URINE CULTURE REFLEX: ABNORMAL

## 2022-07-25 ENCOUNTER — TELEPHONE (OUTPATIENT)
Dept: PHARMACY | Age: 67
End: 2022-07-25

## 2022-07-25 NOTE — TELEPHONE ENCOUNTER
Pharmacy Note  ED Culture Follow-up    Mahsa Zambrano is a 74 y.o. female. Allergies: Seasonal     Labs:  Lab Results   Component Value Date    BUN 12 07/21/2022    CREATININE 0.8 07/21/2022    WBC 7.6 07/21/2022     Estimated Creatinine Clearance: 68 mL/min (based on SCr of 0.8 mg/dL). Current antimicrobials:   Macrobid    ASSESSMENT:  Micro results:   Urine culture: positive for Proteus mirabilis     PLAN:  Need for intervention: Yes  Discussed with: MIKEY Fernandez  Chosen treatment:    Patient will be treated by specialist    Patient response:   Called and spoke with patient. Informed her that her antibiotic will not cover the bug that is growing in her urine and informed her I could call in a different antibiotic that would treat the bacteria in her urine. She states she has an appointment with urology tomorrow and would like urology to handle her antibiotics. Will defer to urology for treatment    Called/sent in prescription to: Not applicable    Please call with any questions.  Anthony Jarrett, [address] [place] [address], PharmD 5:32 PM 7/25/2022

## 2022-07-26 ENCOUNTER — OFFICE VISIT (OUTPATIENT)
Dept: UROLOGY | Age: 67
End: 2022-07-26
Payer: MEDICARE

## 2022-07-26 VITALS
WEIGHT: 147 LBS | HEIGHT: 64 IN | BODY MASS INDEX: 25.1 KG/M2 | SYSTOLIC BLOOD PRESSURE: 130 MMHG | DIASTOLIC BLOOD PRESSURE: 72 MMHG

## 2022-07-26 DIAGNOSIS — N12 PYELONEPHRITIS: ICD-10-CM

## 2022-07-26 DIAGNOSIS — N39.0 URINARY TRACT INFECTION WITHOUT HEMATURIA, SITE UNSPECIFIED: ICD-10-CM

## 2022-07-26 DIAGNOSIS — N39.490 URINARY INCONTINENCE, OVERFLOW: Primary | ICD-10-CM

## 2022-07-26 DIAGNOSIS — N20.0 NEPHROLITHIASIS: ICD-10-CM

## 2022-07-26 LAB
BILIRUBIN URINE: NEGATIVE
BLOOD URINE, POC: ABNORMAL
CHARACTER, URINE: CLEAR
COLOR, URINE: YELLOW
GLUCOSE URINE: NEGATIVE MG/DL
KETONES, URINE: NEGATIVE
LEUKOCYTE CLUMPS, URINE: ABNORMAL
NITRITE, URINE: NEGATIVE
PH, URINE: 7 (ref 5–9)
PROTEIN, URINE: NEGATIVE MG/DL
SPECIFIC GRAVITY, URINE: 1.01 (ref 1–1.03)
UROBILINOGEN, URINE: 0.2 EU/DL (ref 0–1)

## 2022-07-26 PROCEDURE — 99214 OFFICE O/P EST MOD 30 MIN: CPT | Performed by: UROLOGY

## 2022-07-26 PROCEDURE — 81003 URINALYSIS AUTO W/O SCOPE: CPT | Performed by: UROLOGY

## 2022-07-26 PROCEDURE — 0509F URINE INCON PLAN DOCD: CPT | Performed by: UROLOGY

## 2022-07-26 PROCEDURE — G8428 CUR MEDS NOT DOCUMENT: HCPCS | Performed by: UROLOGY

## 2022-07-26 PROCEDURE — 1090F PRES/ABSN URINE INCON ASSESS: CPT | Performed by: UROLOGY

## 2022-07-26 PROCEDURE — 4004F PT TOBACCO SCREEN RCVD TLK: CPT | Performed by: UROLOGY

## 2022-07-26 PROCEDURE — 3017F COLORECTAL CA SCREEN DOC REV: CPT | Performed by: UROLOGY

## 2022-07-26 PROCEDURE — G8400 PT W/DXA NO RESULTS DOC: HCPCS | Performed by: UROLOGY

## 2022-07-26 PROCEDURE — 1123F ACP DISCUSS/DSCN MKR DOCD: CPT | Performed by: UROLOGY

## 2022-07-26 PROCEDURE — G8417 CALC BMI ABV UP PARAM F/U: HCPCS | Performed by: UROLOGY

## 2022-07-26 RX ORDER — TRAMADOL HYDROCHLORIDE 50 MG/1
50 TABLET ORAL EVERY 6 HOURS PRN
Qty: 12 TABLET | Refills: 0 | Status: SHIPPED | OUTPATIENT
Start: 2022-07-26 | End: 2022-07-29

## 2022-07-26 RX ORDER — CEFDINIR 300 MG/1
300 CAPSULE ORAL 2 TIMES DAILY
Qty: 28 CAPSULE | Refills: 0 | Status: SHIPPED | OUTPATIENT
Start: 2022-07-26 | End: 2022-08-09

## 2022-07-26 NOTE — PROGRESS NOTES
[address]  [address]  [facility] [address]  Dept: [phone]  Dept Fax: [phone]  Loc: [address] [facility] Urology Office Note -     Patient:  Jolly Rice  YOB: 1955    The patient is a 74 y.o. female who presents today for evaluation of the following problems:   Chief Complaint   Patient presents with    Urinary Tract Infection    Nephrolithiasis     [facility] f/u        History of Present Illness:    Kidney stone  + UTI  Right flank pain with NO ureteral stone  Has left kidney stone    Incontinence  Had interstim removed by anoop  Botox 100 u was only 20 percent helpful  Has discussed 200 u in past with anoop        Requested/reviewed records from [facility], [facility] office and/or outside [de-identified]    (Patient's old records have been requested, reviewed and pertinent findings summarized in today's note.)    Procedures Today: N/A    Last several PSA's:  No results found for: PSA    Last total testosterone:  No results found for: TESTOSTERONE    Urinalysis today:  Results for POC orders placed in visit on 07/26/22   POCT Urinalysis No Micro (Auto)   Result Value Ref Range    Glucose, Ur Negative NEGATIVE mg/dl    Bilirubin Urine Negative     Ketones, Urine Negative NEGATIVE    Specific Gravity, Urine 1.015 1.002 - 1.030    Blood, UA POC Small (A) NEGATIVE    pH, Urine 7.00 5.0 - 9.0    Protein, Urine Negative NEGATIVE mg/dl    Urobilinogen, Urine 0.20 0.0 - 1.0 eu/dl    Nitrite, Urine Negative NEGATIVE    Leukocyte Clumps, Urine Moderate (A) NEGATIVE    Color, Urine Yellow YELLOW-STRAW    Character, Urine Clear [id number].[id number]       Last BUN and creatinine:  Lab Results   Component Value Date    BUN 12 07/21/2022     Lab Results   Component Value Date    CREATININE 0.8 07/21/2022       Imaging Reviewed during this Office Visit:   Damaris Kahn MD independently reviewed the images and verified the radiology reports from:    CT ABDOMEN PELVIS W IV CONTRAST Additional Contrast? None    Result Date: 7/21/2022  PROCEDURE: CT ABDOMEN PELVIS W IV CONTRAST CLINICAL INFORMATION: Right upper quadrant and right lower quadrant abdominal pain. COMPARISON: CT abdomen and pelvis 5/18/2020 and 12/21/2019. TECHNIQUE: Axial 5 mm CT images were obtained through the abdomen and pelvis after the administration of 80  cc Isovue 370 intravenous contrast. Coronal and sagittal reconstructions were obtained. All CT scans at this facility use dose modulation, iterative reconstruction, and/or weight-based dosing when appropriate to reduce radiation dose to as low as reasonably achievable. FINDINGS: Lung bases: Dependent atelectasis is noted at the lung bases. Liver/gallbladder/bilary tree: Hepatomegaly and hepatic steatosis are stable. A 14 mm hypoattenuating lesion in the left hepatic lobe is unchanged. The gallbladder is surgically absent. No biliary ductal dilatation is observed. Pancreas: Normal. Spleen : Normal. Adrenal glands: Normal. Kidneys/ ureters/ bladder: A nonobstructing calculus in the lower pole of the left kidney measures 8 x 13 mm (series 2, image 31). A simple cyst in the lower pole of the left kidney measuring 9 mm is stable. No hydronephrosis or hydroureter is present. The urinary bladder is unremarkable. Gastrointestinal:  The appendix is normal. No bowel obstruction, free fluid, fluid collection, or free air is visualized. A small sliding-type hiatal hernia is stable. A 19 mm fat-containing umbilical hernia is stable. Retroperitoneum / lymph nodes: The aorta is not dilated. No lymphadenopathy is present. Pelvis: The uterus is surgically absent. No adnexal lesions are identified. Musculoskeletal: Diffuse osteopenia is present. Mild chronic arthritic changes are noted in both hips. Multilevel degenerative disc disease is noted in the thoracic and lumbar spine.  The visualized skeletal structures appear intact. 1. Normal appendix. No bowel obstruction. No acute findings. 2. Multiple stable chronic findings are discussed. **This report has been created using voice recognition software. It may contain minor errors which are inherent in voice recognition technology. ** Final report electronically signed by Dr Lexie Quintana on 7/21/2022 1:56 PM    XR CHEST PORTABLE    Result Date: 7/21/2022  PROCEDURE: XR CHEST PORTABLE CLINICAL INFORMATION: cough COMPARISON: 4/26/2022 TECHNIQUE: A single mobile view of the chest was obtained. 1. Normal heart size. Accessory azygos fissure right apex. 2. No acute findings. No infiltrates or effusions are seen. **This report has been created using voice recognition software. It may contain minor errors which are inherent in voice recognition technology. ** Final report electronically signed by Dr. Sol Clalahan on 7/21/2022 10:41 AM      PAST MEDICAL, FAMILY AND SOCIAL HISTORY:  Past Medical History:   Diagnosis Date    Acute renal failure with acute cortical necrosis ([address] 12/21/2019    Allergic rhinitis     Arthritis     spine    Bipolar 1 disorder (HCC)     Blood circulation, collateral     Cancer ([address] 2011    cancerous polyps removed - Dr. Huong Ly    Cataract     Colon polyps     COPD (chronic obstructive pulmonary disease) ([address] 7/24/2014    COPD (chronic obstructive pulmonary disease) ([address]     Coronary vasospasm ([address] 8/17/2019    Depression     Diverticulitis of colon     GERD (gastroesophageal reflux disease)     Glaucoma     Hearing loss     Hiatal hernia     History of arterial ischemic stroke 7/20/2019    History of colonoscopy 2002    History of kidney stones     Hx of blood clots 6/17/2014    PE and collar bone area after shoulder surgery    Hyperlipidemia     Hypertension     Liver disease     enlarged liver - damaged with alcohol in past but no cirrhosis per patient    Pneumonia 7/24/2014    Sexual problems     Suicidal thoughts     2015 admitted to [facility] from [facility]    Thyroid disease     Urinary incontinence     Vomiting     Wears dentures     Wears glasses      Past Surgical History:   Procedure Laterality Date    ABDOMEN SURGERY      x4 removed cysts and ovary removed    CARDIAC SURGERY      heart caths, no stents    CARPAL TUNNEL RELEASE Bilateral 2016    CHOLECYSTECTOMY, LAPAROSCOPIC  6/12/15    Dr. Forrest Ann N/A 2/10/2022    CYSTOSCOPY URETHRAL IMPLANT INJECTION performed by Garrett Erazo MD at [address] N/A 7/13/2020    CYSTOSCOPY WITH BLADDER BIOPSIES performed by Isis Burnett MD at [facility], ESOPHAGUS      EGD      [facility] Right 10/7/2004    Dr. Bennett Moon Bilateral 2016    decompression    HYSTERECTOMY ([facility])  1998    Dr. Hermilo Echols with [facility] [address]  2004, 2014    right shoulder    SHOULDER SURGERY  2014    right    SKIN BIOPSY  2006    under left eye    STIMULATOR SURGERY N/A 11/4/2020    STAGE 1 INTERSTIM PLACEMENT performed by Isis Burnett MD at [address] [facility] 11/23/2020    PLACEMENT OF STAGE II INTERSTIM performed by Isis Burnett MD at [address] [facility] 4/14/2022    Removal of Interstim performed by Garrett Erazo MD at [facility] History   Problem Relation Age of Onset    Cancer Mother     Heart Disease Mother     High Blood Pressure Mother     High Cholesterol Mother     Cancer Father         brain    Depression Father     Heart Disease Father     High Cholesterol Father     Mental Illness Father     Cancer Sister     Heart Attack Sister     Heart Disease Maternal Uncle     High Cholesterol Maternal Uncle     Diabetes Maternal Grandmother     Heart Disease Maternal Grandmother     High Cholesterol Maternal Grandmother     Early Death Maternal Grandfather     Heart Disease Maternal Grandfather     High Cholesterol Maternal Grandfather     Stroke Maternal Grandfather     Heart Disease Paternal Grandmother     High Cholesterol Paternal Grandmother     Heart Disease Paternal Grandfather     High Cholesterol Paternal Grandfather     Colon Cancer Neg Hx     Inflam Bowel Dis Neg Hx      No outpatient medications have been marked as taking for the 7/26/22 encounter (Office Visit) with Tato Talbert MD.       Seasonal  Social History     Tobacco Use   Smoking Status Every Day    Packs/day: 0.50    Years: 30.00    Pack years: 15.00    Types: Cigarettes    Start date: 4/22/1977   Smokeless Tobacco Never   Tobacco Comments    Trying to quit      (If patient a smoker, smoking cessation counseling offered)   Social History     Substance and Sexual Activity   Alcohol Use No    Comment: none for 2 years       REVIEW OF SYSTEMS:  Constitutional: negative  Eyes: negative  Respiratory: negative  Cardiovascular: negative  Gastrointestinal: negative  Genitourinary: see HPI  Musculoskeletal: negative  Skin: negative   Neurological: negative  Hematological/Lymphatic: negative  Psychological: negative        Physical Exam:    This a 74 y.o. female  Vitals:    07/26/22 0815   BP: 130/72     Body mass index is 25.23 kg/m². Constitutional: Patient in no acute distress;         Assessment and Plan        1. Urinary incontinence, overflow    2. Nephrolithiasis    3. Urinary tract infection without hematuria, site unspecified               Plan:      Incontinence stable- may need botox 200 in future  Cefdinir 14 days for right pyelonephritis- no obstructing stone  Left sided stone will need addressed as outpatient but not contributing to clinical picture at this time  Keep appointment with anoop next month. Kub prior. May consider ESWL. Prescriptions Ordered:  No orders of the defined types were placed in this encounter.      Orders Placed:  Orders Placed This Encounter   Procedures    POCT Urinalysis No Micro (Auto)            PETE HEIN [facility], MD

## 2022-07-27 ENCOUNTER — HOSPITAL ENCOUNTER (EMERGENCY)
Age: 67
Discharge: HOME | End: 2022-07-28
Attending: EMERGENCY MEDICINE
Payer: MEDICARE

## 2022-07-27 ENCOUNTER — APPOINTMENT (OUTPATIENT)
Dept: CT IMAGING | Age: 67
End: 2022-07-27
Payer: MEDICARE

## 2022-07-27 DIAGNOSIS — F14.10 NONDEPENDENT COCAINE ABUSE (HCC): ICD-10-CM

## 2022-07-27 DIAGNOSIS — K21.9 GASTROESOPHAGEAL REFLUX DISEASE WITHOUT ESOPHAGITIS: Primary | ICD-10-CM

## 2022-07-27 LAB
ALBUMIN SERPL-MCNC: 4.1 G/DL (ref 3.5–5.1)
ALP BLD-CCNC: 98 U/L (ref 38–126)
ALT SERPL-CCNC: 19 U/L (ref 11–66)
ANION GAP SERPL CALCULATED.3IONS-SCNC: 13 MEQ/L (ref 8–16)
AST SERPL-CCNC: 14 U/L (ref 5–40)
BASOPHILS # BLD: 0.5 %
BASOPHILS ABSOLUTE: 0 THOU/MM3 (ref 0–0.1)
BILIRUB SERPL-MCNC: 0.4 MG/DL (ref 0.3–1.2)
BILIRUBIN DIRECT: < 0.2 MG/DL (ref 0–0.3)
BUN BLDV-MCNC: 12 MG/DL (ref 7–22)
CALCIUM SERPL-MCNC: 10 MG/DL (ref 8.5–10.5)
CHLORIDE BLD-SCNC: 101 MEQ/L (ref 98–111)
CO2: 21 MEQ/L (ref 23–33)
CREAT SERPL-MCNC: 0.9 MG/DL (ref 0.4–1.2)
EOSINOPHIL # BLD: 1.3 %
EOSINOPHILS ABSOLUTE: 0.1 THOU/MM3 (ref 0–0.4)
ERYTHROCYTE [DISTWIDTH] IN BLOOD BY AUTOMATED COUNT: 13 % (ref 11.5–14.5)
ERYTHROCYTE [DISTWIDTH] IN BLOOD BY AUTOMATED COUNT: 43.5 FL (ref 35–45)
ETHYL ALCOHOL, SERUM: < 0.01 %
GFR SERPL CREATININE-BSD FRML MDRD: 63 ML/MIN/1.73M2
GLUCOSE BLD-MCNC: 108 MG/DL (ref 70–108)
HCT VFR BLD CALC: 43.9 % (ref 37–47)
HEMOGLOBIN: 14.4 GM/DL (ref 12–16)
IMMATURE GRANS (ABS): 0.02 THOU/MM3 (ref 0–0.07)
IMMATURE GRANULOCYTES: 0.3 %
LIPASE: 28.7 U/L (ref 5.6–51.3)
LYMPHOCYTES # BLD: 41.5 %
LYMPHOCYTES ABSOLUTE: 3.1 THOU/MM3 (ref 1–4.8)
MAGNESIUM: 1.8 MG/DL (ref 1.6–2.4)
MCH RBC QN AUTO: 29.9 PG (ref 26–33)
MCHC RBC AUTO-ENTMCNC: 32.8 GM/DL (ref 32.2–35.5)
MCV RBC AUTO: 91.3 FL (ref 81–99)
MONOCYTES # BLD: 6.3 %
MONOCYTES ABSOLUTE: 0.5 THOU/MM3 (ref 0.4–1.3)
NUCLEATED RED BLOOD CELLS: 0 /100 WBC
OSMOLALITY CALCULATION: 270.4 MOSMOL/KG (ref 275–300)
PLATELET # BLD: 244 THOU/MM3 (ref 130–400)
PMV BLD AUTO: 10.1 FL (ref 9.4–12.4)
POTASSIUM SERPL-SCNC: 4 MEQ/L (ref 3.5–5.2)
RBC # BLD: 4.81 MILL/MM3 (ref 4.2–5.4)
SEG NEUTROPHILS: 50.1 %
SEGMENTED NEUTROPHILS ABSOLUTE COUNT: 3.8 THOU/MM3 (ref 1.8–7.7)
SODIUM BLD-SCNC: 135 MEQ/L (ref 135–145)
TOTAL PROTEIN: 7 G/DL (ref 6.1–8)
TROPONIN T: < 0.01 NG/ML
WBC # BLD: 7.5 THOU/MM3 (ref 4.8–10.8)

## 2022-07-27 PROCEDURE — 82077 ASSAY SPEC XCP UR&BREATH IA: CPT

## 2022-07-27 PROCEDURE — 99284 EMERGENCY DEPT VISIT MOD MDM: CPT

## 2022-07-27 PROCEDURE — 96361 HYDRATE IV INFUSION ADD-ON: CPT

## 2022-07-27 PROCEDURE — 83690 ASSAY OF LIPASE: CPT

## 2022-07-27 PROCEDURE — 85025 COMPLETE CBC W/AUTO DIFF WBC: CPT

## 2022-07-27 PROCEDURE — 96374 THER/PROPH/DIAG INJ IV PUSH: CPT

## 2022-07-27 PROCEDURE — 74176 CT ABD & PELVIS W/O CONTRAST: CPT

## 2022-07-27 PROCEDURE — 36415 COLL VENOUS BLD VENIPUNCTURE: CPT

## 2022-07-27 PROCEDURE — 82248 BILIRUBIN DIRECT: CPT

## 2022-07-27 PROCEDURE — 6360000002 HC RX W HCPCS: Performed by: EMERGENCY MEDICINE

## 2022-07-27 PROCEDURE — 6370000000 HC RX 637 (ALT 250 FOR IP): Performed by: EMERGENCY MEDICINE

## 2022-07-27 PROCEDURE — 84484 ASSAY OF TROPONIN QUANT: CPT

## 2022-07-27 PROCEDURE — 96375 TX/PRO/DX INJ NEW DRUG ADDON: CPT

## 2022-07-27 PROCEDURE — 80053 COMPREHEN METABOLIC PANEL: CPT

## 2022-07-27 PROCEDURE — 93005 ELECTROCARDIOGRAM TRACING: CPT | Performed by: EMERGENCY MEDICINE

## 2022-07-27 PROCEDURE — 83735 ASSAY OF MAGNESIUM: CPT

## 2022-07-27 PROCEDURE — 2580000003 HC RX 258: Performed by: EMERGENCY MEDICINE

## 2022-07-27 RX ORDER — 0.9 % SODIUM CHLORIDE 0.9 %
1000 INTRAVENOUS SOLUTION INTRAVENOUS ONCE
Status: COMPLETED | OUTPATIENT
Start: 2022-07-27 | End: 2022-07-27

## 2022-07-27 RX ORDER — IPRATROPIUM BROMIDE AND ALBUTEROL SULFATE 2.5; .5 MG/3ML; MG/3ML
1 SOLUTION RESPIRATORY (INHALATION) ONCE
Status: DISCONTINUED | OUTPATIENT
Start: 2022-07-27 | End: 2022-07-27

## 2022-07-27 RX ORDER — KETOROLAC TROMETHAMINE 30 MG/ML
15 INJECTION, SOLUTION INTRAMUSCULAR; INTRAVENOUS ONCE
Status: COMPLETED | OUTPATIENT
Start: 2022-07-27 | End: 2022-07-27

## 2022-07-27 RX ORDER — ONDANSETRON 2 MG/ML
4 INJECTION INTRAMUSCULAR; INTRAVENOUS ONCE
Status: COMPLETED | OUTPATIENT
Start: 2022-07-27 | End: 2022-07-27

## 2022-07-27 RX ORDER — DEXAMETHASONE SODIUM PHOSPHATE 4 MG/ML
4 INJECTION, SOLUTION INTRA-ARTICULAR; INTRALESIONAL; INTRAMUSCULAR; INTRAVENOUS; SOFT TISSUE ONCE
Status: DISCONTINUED | OUTPATIENT
Start: 2022-07-27 | End: 2022-07-27

## 2022-07-27 RX ORDER — PANTOPRAZOLE SODIUM 40 MG/1
40 TABLET, DELAYED RELEASE ORAL ONCE
Status: COMPLETED | OUTPATIENT
Start: 2022-07-27 | End: 2022-07-27

## 2022-07-27 RX ORDER — PSEUDOEPHEDRINE HYDROCHLORIDE 30 MG/1
30 TABLET ORAL ONCE
Status: DISCONTINUED | OUTPATIENT
Start: 2022-07-27 | End: 2022-07-27

## 2022-07-27 RX ADMIN — SODIUM CHLORIDE 1000 ML: 9 INJECTION, SOLUTION INTRAVENOUS at 22:45

## 2022-07-27 RX ADMIN — KETOROLAC TROMETHAMINE 15 MG: 30 INJECTION, SOLUTION INTRAMUSCULAR; INTRAVENOUS at 22:45

## 2022-07-27 RX ADMIN — LIDOCAINE HYDROCHLORIDE: 20 SOLUTION ORAL; TOPICAL at 22:45

## 2022-07-27 RX ADMIN — PANTOPRAZOLE SODIUM 40 MG: 40 TABLET, DELAYED RELEASE ORAL at 22:46

## 2022-07-27 RX ADMIN — ONDANSETRON 4 MG: 2 INJECTION INTRAMUSCULAR; INTRAVENOUS at 22:46

## 2022-07-27 ASSESSMENT — ENCOUNTER SYMPTOMS
COUGH: 0
EYE PAIN: 0
WHEEZING: 0
BACK PAIN: 0
SINUS PRESSURE: 0
VOICE CHANGE: 0
SORE THROAT: 0
EYE DISCHARGE: 0
EYE ITCHING: 0
ABDOMINAL PAIN: 1
VOMITING: 1
RHINORRHEA: 0
TROUBLE SWALLOWING: 0
SHORTNESS OF BREATH: 0
CONSTIPATION: 0
CHOKING: 0
BLOOD IN STOOL: 0
PHOTOPHOBIA: 0
EYE REDNESS: 0
NAUSEA: 1
DIARRHEA: 0
ABDOMINAL DISTENTION: 0
CHEST TIGHTNESS: 0

## 2022-07-27 ASSESSMENT — PAIN SCALES - GENERAL: PAINLEVEL_OUTOF10: 7

## 2022-07-28 VITALS
HEART RATE: 65 BPM | SYSTOLIC BLOOD PRESSURE: 136 MMHG | TEMPERATURE: 98.5 F | OXYGEN SATURATION: 97 % | RESPIRATION RATE: 18 BRPM | DIASTOLIC BLOOD PRESSURE: 78 MMHG

## 2022-07-28 LAB
AMPHETAMINE+METHAMPHETAMINE URINE SCREEN: NEGATIVE
BACTERIA: ABNORMAL /HPF
BARBITURATE QUANTITATIVE URINE: NEGATIVE
BENZODIAZEPINE QUANTITATIVE URINE: NEGATIVE
BILIRUBIN URINE: NEGATIVE
BLOOD, URINE: ABNORMAL
CANNABINOID QUANTITATIVE URINE: NEGATIVE
CASTS 2: ABNORMAL /LPF
CASTS UA: ABNORMAL /LPF
CHARACTER, URINE: CLEAR
COCAINE METABOLITE QUANTITATIVE URINE: POSITIVE
COLOR: YELLOW
CRYSTALS, UA: ABNORMAL
EKG ATRIAL RATE: 54 BPM
EKG P AXIS: 45 DEGREES
EKG P-R INTERVAL: 156 MS
EKG Q-T INTERVAL: 456 MS
EKG QRS DURATION: 80 MS
EKG QTC CALCULATION (BAZETT): 432 MS
EKG R AXIS: -11 DEGREES
EKG T AXIS: 72 DEGREES
EKG VENTRICULAR RATE: 54 BPM
EPITHELIAL CELLS, UA: ABNORMAL /HPF
GLUCOSE URINE: NEGATIVE MG/DL
KETONES, URINE: NEGATIVE
LEUKOCYTE ESTERASE, URINE: ABNORMAL
MISCELLANEOUS 2: ABNORMAL
NITRITE, URINE: NEGATIVE
OPIATES, URINE: NEGATIVE
OXYCODONE: NEGATIVE
PH UA: 6 (ref 5–9)
PHENCYCLIDINE QUANTITATIVE URINE: NEGATIVE
PROTEIN UA: NEGATIVE
RBC URINE: ABNORMAL /HPF
RENAL EPITHELIAL, UA: ABNORMAL
SPECIFIC GRAVITY, URINE: 1.01 (ref 1–1.03)
UROBILINOGEN, URINE: 0.2 EU/DL (ref 0–1)
WBC UA: ABNORMAL /HPF
YEAST: ABNORMAL

## 2022-07-28 PROCEDURE — 93010 ELECTROCARDIOGRAM REPORT: CPT | Performed by: INTERNAL MEDICINE

## 2022-07-28 PROCEDURE — 80307 DRUG TEST PRSMV CHEM ANLYZR: CPT

## 2022-07-28 PROCEDURE — 87086 URINE CULTURE/COLONY COUNT: CPT

## 2022-07-28 PROCEDURE — 81001 URINALYSIS AUTO W/SCOPE: CPT

## 2022-07-28 RX ORDER — ONDANSETRON 4 MG/1
4 TABLET, ORALLY DISINTEGRATING ORAL 3 TIMES DAILY PRN
Qty: 21 TABLET | Refills: 0 | Status: ON HOLD | OUTPATIENT
Start: 2022-07-28

## 2022-07-28 RX ORDER — CALCIUM CARBONATE 200(500)MG
1 TABLET,CHEWABLE ORAL DAILY
Qty: 30 TABLET | Refills: 0 | Status: SHIPPED | OUTPATIENT
Start: 2022-07-28 | End: 2022-08-27

## 2022-07-28 RX ORDER — PANTOPRAZOLE SODIUM 40 MG/1
40 TABLET, DELAYED RELEASE ORAL DAILY
Qty: 30 TABLET | Refills: 0 | Status: ON HOLD | OUTPATIENT
Start: 2022-07-28 | End: 2022-08-27

## 2022-07-28 NOTE — ED NOTES
Patient resting in bed. Respirations easy and unlabored. No distress noted. Call light within reach.        Dominik Arroyo RN  07/28/22 [id number]

## 2022-07-28 NOTE — ED NOTES
ED nurse-to-nurse bedside report    Chief Complaint   Patient presents with    Nephrolithiasis      LOC: alert and orientated to name, place, date  Vital signs   Vitals:    07/27/22 2214 07/28/22 0038   BP: (!) 141/72 136/78   Pulse: 64 65   Resp: 18 18   Temp: 98.5 °F (36.9 °C)    SpO2: 95% 97%      Pain:    Pain Interventions: mar  Pain Goal: 0  Oxygen: No    Current needs required RA   Telemetry: Yes  LDAs:   Peripheral IV 07/27/22 Right Forearm (Active)     Continuous Infusions:   Mobility: Independent  Sánchez Fall Risk Score: No flowsheet data found.   Fall Interventions: socks call light   Report given to:         Nana Bernheim, RN  07/28/22 1789

## 2022-07-28 NOTE — DISCHARGE INSTRUCTIONS
Patient has what appears to be GERD and gastritis. Patient has been given medication for this she is instructed to take it as prescribed. Patient also is instructed to discontinue use of cocaine. She is instructed to follow-up with her primary care physician to do so within the next 1 to 2 days. She is instructed return to the nearest emergency room immediately for any new or worsening complaints.

## 2022-07-28 NOTE — ED PROVIDER NOTES
[facility]  eMERGENCY dEPARTMENT eNCOUnter          CHIEF COMPLAINT       Chief Complaint   Patient presents with    Nephrolithiasis       Nurses Notes reviewed and I agree except as noted in the HPI. HISTORY OF PRESENT ILLNESS    Julia Knight is a 74 y.o. female who presents right-sided flank pain abdominal pain which started several weeks ago. She was recently here and diagnosed with a kidney stone. She was sent to urology with whom she follows up they did not do any procedures. They have placed the patient on antibiotics tramadol and Flomax. They have a scheduled upcoming appointment. Patient states that the pain in the right flank worsened. She states she also has pain in her epigastric area but she does have GERD. She had nausea and vomiting today. No blood in the urine no blood in the stool no blood in the vomitus. Patient is otherwise resting comfortably on the cot no apparent distress mild to moderate amount of pain. No other physical complaints at this time. REVIEW OF SYSTEMS     Review of Systems   Constitutional:  Negative for activity change, appetite change, diaphoresis, fatigue and unexpected weight change. HENT:  Negative for congestion, ear discharge, ear pain, hearing loss, rhinorrhea, sinus pressure, sore throat, trouble swallowing and voice change. Eyes:  Negative for photophobia, pain, discharge, redness and itching. Respiratory:  Negative for cough, choking, chest tightness, shortness of breath and wheezing. Cardiovascular:  Negative for chest pain, palpitations and leg swelling. Gastrointestinal:  Positive for abdominal pain, nausea and vomiting. Negative for abdominal distention, blood in stool, constipation and diarrhea. Endocrine: Negative for polydipsia, polyphagia and polyuria. Genitourinary:  Positive for flank pain.  Negative for decreased urine volume, difficulty urinating, dysuria, enuresis, frequency, hematuria, menstrual problem, urgency, vaginal bleeding, vaginal discharge and vaginal pain. Musculoskeletal:  Negative for arthralgias, back pain, gait problem, myalgias, neck pain and neck stiffness. Skin:  Negative for pallor and rash. Allergic/Immunologic: Negative for immunocompromised state. Neurological:  Negative for dizziness, tremors, seizures, syncope, facial asymmetry, weakness, light-headedness, numbness and headaches. Hematological:  Negative for adenopathy. Does not bruise/bleed easily. Psychiatric/Behavioral:  Negative for agitation, hallucinations and suicidal ideas. The patient is not nervous/anxious. PAST MEDICAL HISTORY    has a past medical history of Acute renal failure with acute cortical necrosis ([address] Allergic rhinitis, Arthritis, Bipolar 1 disorder ([facility] [address] Blood circulation, collateral, Cancer ([facility] [address] Cataract, Colon polyps, COPD (chronic obstructive pulmonary disease) (HCC), COPD (chronic obstructive pulmonary disease) ([facility] [address] Coronary vasospasm (HCC), Depression, Diverticulitis of colon, GERD (gastroesophageal reflux disease), Glaucoma, Hearing loss, Hiatal hernia, History of arterial ischemic stroke, History of colonoscopy, History of kidney stones, Hx of blood clots, Hyperlipidemia, Hypertension, Liver disease, Pneumonia, Sexual problems, Suicidal thoughts, Thyroid disease, Urinary incontinence, Vomiting, Wears dentures, and Wears glasses. SURGICAL HISTORY      has a past surgical history that includes Mandible fracture surgery (1984); shoulder surgery (2014); Colonoscopy (2011); Dilatation, esophagus; Elbow surgery (Right, 10/7/2004); Rotator cuff repair (2004, 2014); skin biopsy (2006); Cholecystectomy, laparoscopic (6/12/15); Elbow surgery (Bilateral, 2016); Carpal tunnel release (Bilateral, 2016); Hysterectomy (1998); cystoscopy w biopsy of bladder (N/A, 7/13/2020); Stimulator Surgery (N/A, 11/4/2020); Stimulator Surgery (N/A, 11/23/2020); Abdomen surgery;  Cystoscopy (N/A, 2/10/2022); Cardiac surgery; Stimulator Surgery (N/A, 4/14/2022); and EGD. CURRENT MEDICATIONS       Previous Medications    ACETAMINOPHEN (MAPAP) 325 MG TABLET    Take by mouth every 6 hours as needed for Pain 2 tab    ALBUTEROL (PROVENTIL) (2.5 MG/3ML) 0.083% NEBULIZER SOLUTION    Take 3 mLs by nebulization every 6 hours as needed for Wheezing    ALBUTEROL SULFATE HFA (PROVENTIL HFA) 108 (90 BASE) MCG/ACT INHALER    Inhale 2 puffs into the lungs every 6 hours as needed for Wheezing    ARIPIPRAZOLE (ABILIFY) 2 MG TABLET    Take 2 mg by mouth daily    ASPIRIN 81 MG EC TABLET    Take 1 tablet by mouth daily    ATORVASTATIN (LIPITOR) 40 MG TABLET    Take 40 mg by mouth nightly     CARVEDILOL (COREG) 3.125 MG TABLET    TAKE 1 TABLET BY MOUTH TWICE DAILY    CEFDINIR (OMNICEF) 300 MG CAPSULE    Take 1 capsule by mouth in the morning and 1 capsule before bedtime. Do all this for 14 days. CLOPIDOGREL (PLAVIX) 75 MG TABLET    Take 75 mg by mouth daily    ESCITALOPRAM (LEXAPRO) 20 MG TABLET    Take 30 mg by mouth daily     FAMOTIDINE (PEPCID) 20 MG TABLET    Take 20 mg by mouth daily    FENOFIBRATE 160 MG TABLET    Take 160 mg by mouth daily     FERROUS SULFATE 325 (65 FE) MG TABLET    Take 1 tablet by mouth 2 times daily    FLUTICASONE (FLONASE) 50 MCG/ACT NASAL SPRAY    1 spray by Each Nostril route 2 times daily    FLUTICASONE-UMECLIDIN-VILANT (TRELEGY ELLIPTA) 100-62.5-25 MCG/INH AEPB    Inhale 1 puff into the lungs daily    FOLIC ACID (FOLVITE) 1 MG TABLET    Take 1 mg by mouth daily    HYDROXYZINE (VISTARIL) 50 MG CAPSULE    Take 1 capsule by mouth 3 times daily as needed for Itching or Anxiety    IBUPROFEN (IBU) 600 MG TABLET    Take 1 tablet by mouth every 6 hours as needed for Pain    LEVOTHYROXINE (SYNTHROID) 75 MCG TABLET    Take 1 tablet by mouth daily everyday except Sunday take 150 mcg.     NABUMETONE (RELAFEN) 500 MG TABLET    Take 500 mg by mouth 2 times daily    NICOTINE (NICODERM CQ) 21 MG/24HR    Place 1 patch onto the skin in the morning. NICOTINE POLACRILEX (COMMIT) 4 MG LOZENGE    Take 1 lozenge by mouth as needed for Smoking cessation (max 20 per day)    NITROGLYCERIN (NITROSTAT) 0.4 MG SL TABLET    up to max of 3 total doses. If no relief after 1 dose, call 911. OMEPRAZOLE (PRILOSEC) 40 MG DELAYED RELEASE CAPSULE    Take 1 capsule by mouth every morning (before breakfast)    PHENAZOPYRIDINE (PYRIDIUM) 200 MG TABLET    Take 1 tablet by mouth 3 times daily as needed for Pain    POLYETHYLENE GLYCOL (GLYCOLAX) 17 GM/SCOOP POWDER    Colonoscopy Prep Dispense 238 Gram Bottle. Use as Directed    POTASSIUM CHLORIDE (KLOR-CON M) 20 MEQ EXTENDED RELEASE TABLET    Take by mouth daily 2 tab    QUETIAPINE (SEROQUEL) 300 MG TABLET    Take 300 mg by mouth nightly 2 tab    ROFLUMILAST (DALIRESP) 500 MCG TABLET    Take 1 tablet by mouth daily    SUCRALFATE (CARAFATE) 1 GM TABLET    Take 1 tablet by mouth 4 times daily    TOPIRAMATE (TOPAMAX) 50 MG TABLET    Take 1 tablet by mouth 2 times daily    TRAMADOL (ULTRAM) 50 MG TABLET    Take 1 tablet by mouth every 6 hours as needed for Pain for up to 3 days. Intended supply: 3 days. Take lowest dose possible to manage pain    TRAZODONE (DESYREL) 100 MG TABLET    Take 2 tablets by mouth nightly       ALLERGIES     is allergic to seasonal.    FAMILY HISTORY     She indicated that her mother is . She indicated that her father is . She indicated that her sister is alive. She indicated that her brother is alive. She indicated that the status of her maternal grandmother is unknown. She indicated that the status of her maternal grandfather is unknown. She indicated that the status of her paternal grandmother is unknown. She indicated that the status of her paternal grandfather is unknown. She indicated that the status of her maternal uncle is unknown.  She indicated that the status of her neg hx is unknown.   family history includes Cancer in her father, mother, and sister; Depression in her father; Diabetes in her maternal grandmother; Early Death in her maternal grandfather; Heart Attack in her sister; Heart Disease in her father, maternal grandfather, maternal grandmother, maternal uncle, mother, paternal grandfather, and paternal grandmother; High Blood Pressure in her mother; High Cholesterol in her father, maternal grandfather, maternal grandmother, maternal uncle, mother, paternal grandfather, and paternal grandmother; Mental Illness in her father; Stroke in her maternal grandfather. SOCIAL HISTORY      reports that she has been smoking cigarettes. She started smoking about 45 years ago. She has a 15.00 pack-year smoking history. She has never used smokeless tobacco. She reports that she does not currently use drugs after having used the following drugs: Cocaine. Frequency: 1.00 time per week. She reports that she does not drink alcohol. PHYSICAL EXAM     INITIAL VITALS:  temperature is 98.5 °F (36.9 °C). Her blood pressure is 136/78 and her pulse is 65. Her respiration is 18 and oxygen saturation is 97%. Physical Exam  Vitals and nursing note reviewed. Constitutional:       General: She is not in acute distress. Appearance: She is well-developed. She is not diaphoretic. HENT:      Head: Normocephalic and atraumatic. Right Ear: External ear normal.      Left Ear: External ear normal.      Nose: Nose normal.      Mouth/Throat:      Pharynx: No oropharyngeal exudate. Eyes:      General: No scleral icterus. Right eye: No discharge. Left eye: No discharge. Conjunctiva/sclera: Conjunctivae normal.      Pupils: Pupils are equal, round, and reactive to light. Neck:      Thyroid: No thyromegaly. Vascular: No JVD. Trachea: No tracheal deviation. Cardiovascular:      Rate and Rhythm: Normal rate and regular rhythm. Pulses:           Carotid pulses are 2+ on the right side and 2+ on the left side.        Radial pulses are 2+ on the right side and 2+ on the left side. Femoral pulses are 2+ on the right side and 2+ on the left side. Popliteal pulses are 2+ on the right side and 2+ on the left side. Dorsalis pedis pulses are 2+ on the right side and 2+ on the left side. Posterior tibial pulses are 2+ on the right side and 2+ on the left side. Heart sounds: Normal heart sounds, S1 normal and S2 normal. No murmur heard. No friction rub. No gallop. Pulmonary:      Effort: Pulmonary effort is normal.      Breath sounds: Normal breath sounds. No stridor. No decreased breath sounds, wheezing, rhonchi or rales. Chest:      Chest wall: No tenderness. Abdominal:      General: Bowel sounds are normal. There is no distension. Palpations: Abdomen is soft. There is no mass. Tenderness: abdominal tenderness in the epigastric area There is right CVA tenderness. There is no left CVA tenderness, guarding or rebound. Negative signs include Chaidez's sign, Rovsing's sign, McBurney's sign and obturator sign. Hernia: No hernia is present. Musculoskeletal:         General: No tenderness. Normal range of motion. Cervical back: Normal range of motion and neck supple. Right lower leg: No edema. Left lower leg: No edema. Lymphadenopathy:      Cervical: No cervical adenopathy. Skin:     General: Skin is warm and dry. Findings: No bruising, ecchymosis, lesion or rash. Neurological:      Mental Status: She is alert and oriented to person, place, and time. Cranial Nerves: No cranial nerve deficit. Coordination: Coordination normal.      Deep Tendon Reflexes: Reflexes are normal and symmetric. Psychiatric:         Speech: Speech normal.         Behavior: Behavior normal.         Thought Content:  Thought content normal.         Judgment: Judgment normal.         DIFFERENTIAL DIAGNOSIS:   GERD gastritis gastroenteritis kidney stone urinary tract infection less likely small bowel obstruction    DIAGNOSTIC RESULTS     EKG: All EKG's are interpreted by the Emergency Department Physician who either signs or Co-signs this chart in the absence of a cardiologist.  Bradycardia, normal axis, ventricular rate of 54 IL interval 156 QRS duration 80 QT interval 456 QTC of 432    RADIOLOGY: non-plain film images(s) such as CT, Ultrasound and MRI are read by the radiologist.  CT ABDOMEN PELVIS WO CONTRAST Additional Contrast? None   Final Result   Impression:   1. Nonobstructing left nephrolithiasis. 2. Negative for right nephrolithiasis. 3. Normal appendix. 4. Other chronic findings as above. This document has been electronically signed by: Shari Torres MD on    07/27/2022 11:26 PM      All CTs at this facility use dose modulation techniques and iterative    reconstructions, and/or weight-based dosing   when appropriate to reduce radiation to a low as reasonably achievable.             LABS:   Labs Reviewed   BASIC METABOLIC PANEL - Abnormal; Notable for the following components:       Result Value    CO2 21 (*)     All other components within normal limits   GLOMERULAR FILTRATION RATE, ESTIMATED - Abnormal; Notable for the following components:    Est, Glom Filt Rate 63 (*)     All other components within normal limits   OSMOLALITY - Abnormal; Notable for the following components:    Osmolality Calc 270.4 (*)     All other components within normal limits   URINE WITH REFLEXED MICRO - Abnormal; Notable for the following components:    Blood, Urine TRACE (*)     Leukocyte Esterase, Urine LARGE (*)     All other components within normal limits   CULTURE, REFLEXED, URINE    Narrative:     Source: urine, clean catch       Site: clean void          Current Antibiotics: not stated   CBC WITH AUTO DIFFERENTIAL   HEPATIC FUNCTION PANEL   LIPASE   TROPONIN   MAGNESIUM   ETHANOL   URINE DRUG SCREEN   ANION GAP       EMERGENCY DEPARTMENT COURSE:   Vitals:    Vitals:    07/27/22 2214 07/28/22 0038   BP: (!) 141/72 136/78   Pulse: 64 65   Resp: 18 18   Temp: 98.5 °F (36.9 °C)    SpO2: 95% 97%     Patient was assessed at bedside labs and imaging were ordered. Patient was given Zofran Protonix GI cocktail and Toradol. I reviewed all labs and imaging with him and reassessed the patient. Patient is feeling much better after the medications. At this point if the patient is safe for discharged home. She is instructed to follow-up with a primary care physician to do so within the next 1 to 2 days. She is instructed to take all medications as prescribed. She is instructed to return to the nearest emergency room immediately for any new or worsening complaints. I discussed this extensively at bedside with the patient who understood and agreed with the plan. Patient is subsequently discharged home in stable condition. Patient has what appears to be GERD and gastritis. Patient has been given medication for this she is instructed to take it as prescribed. Patient also is instructed to discontinue use of cocaine. She is instructed to follow-up with her primary care physician to do so within the next 1 to 2 days. She is instructed return to the nearest emergency room immediately for any new or worsening complaints. CRITICAL CARE:   None    CONSULTS:  None    PROCEDURES:  None    FINAL IMPRESSION      1. Gastroesophageal reflux disease without esophagitis    2. Nondependent cocaine abuse [facility])          DISPOSITION/PLAN   Discharge    PATIENT REFERRED TO:  BA Paulson - CNP  [address] [facility], [place]  [phone]    Call in 1 day      DISCHARGE MEDICATIONS:  New Prescriptions    CALCIUM CARBONATE (ANTACID) 500 MG CHEWABLE TABLET    Take 1 tablet by mouth in the morning.     ONDANSETRON (ZOFRAN-ODT) 4 MG DISINTEGRATING TABLET    Take 1 tablet by mouth 3 times daily as needed for Nausea or Vomiting       (Please note that portions of this note were completed with a voice recognition program.  Efforts were made to edit the dictations but occasionally words are mis-transcribed.)    Sascha Pineda, [address],   08/01/22 4367

## 2022-07-28 NOTE — ED TRIAGE NOTES
Comes in to the er with c/o a kidney stone that was dx on Monday here, states pain is not getting any better.

## 2022-07-28 NOTE — ED NOTES
ED nurse-to-nurse bedside report    Chief Complaint   Patient presents with    Nephrolithiasis      LOC: alert and orientated to name, place, date  Vital signs   Vitals:    07/27/22 2214   BP: (!) 141/72   Pulse: 64   Resp: 18   Temp: 98.5 °F (36.9 °C)   SpO2: 95%      Pain:    Pain Interventions: Toradol  Pain Goal: 2  Oxygen: No    Current needs required ra   Telemetry: No  LDAs:   Peripheral IV 07/27/22 Right Forearm (Active)     Continuous Infusions:   Mobility: Independent  Sánchez Fall Risk Score: No flowsheet data found.   Fall Interventions: side rails and call light  Report given to: Krishna Crowder, [address], RN  07/27/22 4909

## 2022-07-30 LAB
ORGANISM: ABNORMAL
URINE CULTURE REFLEX: ABNORMAL

## 2022-08-24 ENCOUNTER — TELEPHONE (OUTPATIENT)
Dept: PHARMACY | Age: 67
End: 2022-08-24

## 2022-08-24 NOTE — TELEPHONE ENCOUNTER
Calling pt to see if she wants to reschedule/continue with smoking cessation program.  PT canceled her appt last week on 8/17 and said she would call us to reschedule. Since we had not heard from pt I called her at this time. PT states she does not want to reschedule at this time. Pt states she will call us if/when she needs help or is ready to do the smoking cessation program.  Referral closed.

## 2022-09-26 ENCOUNTER — HOSPITAL ENCOUNTER (OUTPATIENT)
Age: 67
Discharge: HOME | End: 2022-09-26
Payer: MEDICARE

## 2022-09-26 ENCOUNTER — HOSPITAL ENCOUNTER (OUTPATIENT)
Dept: GENERAL RADIOLOGY | Age: 67
Discharge: HOME | End: 2022-09-26
Payer: MEDICARE

## 2022-09-26 DIAGNOSIS — N20.0 NEPHROLITHIASIS: ICD-10-CM

## 2022-09-26 LAB
ALBUMIN SERPL-MCNC: 4.4 G/DL (ref 3.5–5.1)
ALP BLD-CCNC: 95 U/L (ref 38–126)
ALT SERPL-CCNC: 19 U/L (ref 11–66)
ANION GAP SERPL CALCULATED.3IONS-SCNC: 12 MEQ/L (ref 8–16)
AST SERPL-CCNC: 17 U/L (ref 5–40)
BILIRUB SERPL-MCNC: 0.4 MG/DL (ref 0.3–1.2)
BUN BLDV-MCNC: 16 MG/DL (ref 7–22)
CALCIUM SERPL-MCNC: 10.7 MG/DL (ref 8.5–10.5)
CHLORIDE BLD-SCNC: 104 MEQ/L (ref 98–111)
CHOLESTEROL, FASTING: 166 MG/DL (ref 100–199)
CO2: 22 MEQ/L (ref 23–33)
CREAT SERPL-MCNC: 0.8 MG/DL (ref 0.4–1.2)
ERYTHROCYTE [DISTWIDTH] IN BLOOD BY AUTOMATED COUNT: 12.9 % (ref 11.5–14.5)
ERYTHROCYTE [DISTWIDTH] IN BLOOD BY AUTOMATED COUNT: 41.9 FL (ref 35–45)
GFR SERPL CREATININE-BSD FRML MDRD: 72 ML/MIN/1.73M2
GLUCOSE BLD-MCNC: 126 MG/DL (ref 70–108)
HCT VFR BLD CALC: 41.8 % (ref 37–47)
HDLC SERPL-MCNC: 36 MG/DL
HEMOGLOBIN: 14.2 GM/DL (ref 12–16)
LDL CHOLESTEROL CALCULATED: 88 MG/DL
MCH RBC QN AUTO: 29.9 PG (ref 26–33)
MCHC RBC AUTO-ENTMCNC: 34 GM/DL (ref 32.2–35.5)
MCV RBC AUTO: 88 FL (ref 81–99)
PLATELET # BLD: 279 THOU/MM3 (ref 130–400)
PMV BLD AUTO: 9.7 FL (ref 9.4–12.4)
POTASSIUM SERPL-SCNC: 4.2 MEQ/L (ref 3.5–5.2)
RBC # BLD: 4.75 MILL/MM3 (ref 4.2–5.4)
SODIUM BLD-SCNC: 138 MEQ/L (ref 135–145)
T4 FREE: 1.12 NG/DL (ref 0.93–1.76)
TOTAL PROTEIN: 6.8 G/DL (ref 6.1–8)
TRIGLYCERIDE, FASTING: 211 MG/DL (ref 0–199)
TSH SERPL DL<=0.05 MIU/L-ACNC: 5.63 UIU/ML (ref 0.4–4.2)
WBC # BLD: 6.3 THOU/MM3 (ref 4.8–10.8)

## 2022-09-26 PROCEDURE — 80053 COMPREHEN METABOLIC PANEL: CPT

## 2022-09-26 PROCEDURE — 85027 COMPLETE CBC AUTOMATED: CPT

## 2022-09-26 PROCEDURE — 74018 RADEX ABDOMEN 1 VIEW: CPT

## 2022-09-26 PROCEDURE — 84443 ASSAY THYROID STIM HORMONE: CPT

## 2022-09-26 PROCEDURE — 80061 LIPID PANEL: CPT

## 2022-09-26 PROCEDURE — 84439 ASSAY OF FREE THYROXINE: CPT

## 2022-09-26 PROCEDURE — 36415 COLL VENOUS BLD VENIPUNCTURE: CPT

## 2022-09-28 ENCOUNTER — APPOINTMENT (OUTPATIENT)
Dept: GENERAL RADIOLOGY | Age: 67
End: 2022-09-28
Payer: MEDICARE

## 2022-09-28 ENCOUNTER — HOSPITAL ENCOUNTER (EMERGENCY)
Age: 67
Discharge: HOME | End: 2022-09-28
Attending: STUDENT IN AN ORGANIZED HEALTH CARE EDUCATION/TRAINING PROGRAM
Payer: MEDICARE

## 2022-09-28 ENCOUNTER — APPOINTMENT (OUTPATIENT)
Dept: CT IMAGING | Age: 67
End: 2022-09-28
Payer: MEDICARE

## 2022-09-28 VITALS
BODY MASS INDEX: 25.95 KG/M2 | OXYGEN SATURATION: 97 % | HEIGHT: 64 IN | RESPIRATION RATE: 18 BRPM | HEART RATE: 74 BPM | TEMPERATURE: 97.8 F | DIASTOLIC BLOOD PRESSURE: 77 MMHG | WEIGHT: 152 LBS | SYSTOLIC BLOOD PRESSURE: 151 MMHG

## 2022-09-28 DIAGNOSIS — M25.552 LEFT HIP PAIN: Primary | ICD-10-CM

## 2022-09-28 DIAGNOSIS — M87.052 AVASCULAR NECROSIS OF HIP, LEFT (HCC): ICD-10-CM

## 2022-09-28 LAB
ALBUMIN SERPL-MCNC: 3.6 G/DL (ref 3.5–5.1)
ALP BLD-CCNC: 89 U/L (ref 38–126)
ALT SERPL-CCNC: 15 U/L (ref 11–66)
ANION GAP SERPL CALCULATED.3IONS-SCNC: 8 MEQ/L (ref 8–16)
APTT: 36.9 SECONDS (ref 22–38)
AST SERPL-CCNC: 15 U/L (ref 5–40)
BASOPHILS # BLD: 0.5 %
BASOPHILS ABSOLUTE: 0 THOU/MM3 (ref 0–0.1)
BILIRUB SERPL-MCNC: 0.2 MG/DL (ref 0.3–1.2)
BUN BLDV-MCNC: 11 MG/DL (ref 7–22)
CALCIUM SERPL-MCNC: 9.9 MG/DL (ref 8.5–10.5)
CHLORIDE BLD-SCNC: 107 MEQ/L (ref 98–111)
CO2: 23 MEQ/L (ref 23–33)
CREAT SERPL-MCNC: 0.9 MG/DL (ref 0.4–1.2)
EOSINOPHIL # BLD: 1.3 %
EOSINOPHILS ABSOLUTE: 0.1 THOU/MM3 (ref 0–0.4)
ERYTHROCYTE [DISTWIDTH] IN BLOOD BY AUTOMATED COUNT: 13.1 % (ref 11.5–14.5)
ERYTHROCYTE [DISTWIDTH] IN BLOOD BY AUTOMATED COUNT: 43.7 FL (ref 35–45)
GFR SERPL CREATININE-BSD FRML MDRD: 63 ML/MIN/1.73M2
GLUCOSE BLD-MCNC: 98 MG/DL (ref 70–108)
HCT VFR BLD CALC: 38.8 % (ref 37–47)
HEMOGLOBIN: 12.6 GM/DL (ref 12–16)
IMMATURE GRANS (ABS): 0.02 THOU/MM3 (ref 0–0.07)
IMMATURE GRANULOCYTES: 0.3 %
INR BLD: 1.09 (ref 0.85–1.13)
LYMPHOCYTES # BLD: 31 %
LYMPHOCYTES ABSOLUTE: 2 THOU/MM3 (ref 1–4.8)
MCH RBC QN AUTO: 29.6 PG (ref 26–33)
MCHC RBC AUTO-ENTMCNC: 32.5 GM/DL (ref 32.2–35.5)
MCV RBC AUTO: 91.3 FL (ref 81–99)
MONOCYTES # BLD: 8.8 %
MONOCYTES ABSOLUTE: 0.6 THOU/MM3 (ref 0.4–1.3)
NUCLEATED RED BLOOD CELLS: 0 /100 WBC
OSMOLALITY CALCULATION: 275.1 MOSMOL/KG (ref 275–300)
PLATELET # BLD: 253 THOU/MM3 (ref 130–400)
PMV BLD AUTO: 9.5 FL (ref 9.4–12.4)
POTASSIUM REFLEX MAGNESIUM: 4.1 MEQ/L (ref 3.5–5.2)
RBC # BLD: 4.25 MILL/MM3 (ref 4.2–5.4)
SEG NEUTROPHILS: 58.1 %
SEGMENTED NEUTROPHILS ABSOLUTE COUNT: 3.7 THOU/MM3 (ref 1.8–7.7)
SODIUM BLD-SCNC: 138 MEQ/L (ref 135–145)
TOTAL PROTEIN: 6.7 G/DL (ref 6.1–8)
TROPONIN T: < 0.01 NG/ML
WBC # BLD: 6.3 THOU/MM3 (ref 4.8–10.8)

## 2022-09-28 PROCEDURE — 99285 EMERGENCY DEPT VISIT HI MDM: CPT

## 2022-09-28 PROCEDURE — 93005 ELECTROCARDIOGRAM TRACING: CPT | Performed by: STUDENT IN AN ORGANIZED HEALTH CARE EDUCATION/TRAINING PROGRAM

## 2022-09-28 PROCEDURE — 85025 COMPLETE CBC W/AUTO DIFF WBC: CPT

## 2022-09-28 PROCEDURE — 85610 PROTHROMBIN TIME: CPT

## 2022-09-28 PROCEDURE — 70450 CT HEAD/BRAIN W/O DYE: CPT

## 2022-09-28 PROCEDURE — 85730 THROMBOPLASTIN TIME PARTIAL: CPT

## 2022-09-28 PROCEDURE — 73502 X-RAY EXAM HIP UNI 2-3 VIEWS: CPT

## 2022-09-28 PROCEDURE — 36415 COLL VENOUS BLD VENIPUNCTURE: CPT

## 2022-09-28 PROCEDURE — 80053 COMPREHEN METABOLIC PANEL: CPT

## 2022-09-28 PROCEDURE — 6370000000 HC RX 637 (ALT 250 FOR IP): Performed by: STUDENT IN AN ORGANIZED HEALTH CARE EDUCATION/TRAINING PROGRAM

## 2022-09-28 PROCEDURE — 72125 CT NECK SPINE W/O DYE: CPT

## 2022-09-28 PROCEDURE — 84484 ASSAY OF TROPONIN QUANT: CPT

## 2022-09-28 PROCEDURE — 71045 X-RAY EXAM CHEST 1 VIEW: CPT

## 2022-09-28 RX ORDER — ONDANSETRON 4 MG/1
4 TABLET, ORALLY DISINTEGRATING ORAL ONCE
Status: COMPLETED | OUTPATIENT
Start: 2022-09-28 | End: 2022-09-28

## 2022-09-28 RX ORDER — HYDROCODONE BITARTRATE AND ACETAMINOPHEN 5; 325 MG/1; MG/1
1 TABLET ORAL ONCE
Status: COMPLETED | OUTPATIENT
Start: 2022-09-28 | End: 2022-09-28

## 2022-09-28 RX ADMIN — ONDANSETRON 4 MG: 4 TABLET, ORALLY DISINTEGRATING ORAL at 20:17

## 2022-09-28 RX ADMIN — HYDROCODONE BITARTRATE AND ACETAMINOPHEN 1 TABLET: 5; 325 TABLET ORAL at 20:25

## 2022-09-28 ASSESSMENT — ENCOUNTER SYMPTOMS
SHORTNESS OF BREATH: 0
SINUS PAIN: 0
RHINORRHEA: 0
SORE THROAT: 0
DIARRHEA: 0
NAUSEA: 1
BACK PAIN: 1
EYE REDNESS: 0
COUGH: 0
VOMITING: 0
ABDOMINAL PAIN: 1

## 2022-09-28 ASSESSMENT — PAIN SCALES - GENERAL: PAINLEVEL_OUTOF10: 10

## 2022-09-28 ASSESSMENT — PAIN - FUNCTIONAL ASSESSMENT: PAIN_FUNCTIONAL_ASSESSMENT: 0-10

## 2022-09-28 ASSESSMENT — PAIN DESCRIPTION - LOCATION: LOCATION: HIP

## 2022-09-28 ASSESSMENT — HEART SCORE: ECG: 0

## 2022-09-28 ASSESSMENT — PAIN DESCRIPTION - ORIENTATION: ORIENTATION: LEFT

## 2022-09-28 NOTE — ED TRIAGE NOTES
Pt presents to the ER from home with c/o left-sided hip pain that started a few days ago. Pt denies injury or fall. Pt has been taking tylenol. Pt ambulated to room.  VSS

## 2022-09-28 NOTE — ED PROVIDER NOTES
[place] ENCOUNTER          Pt Name: Zachary Apgar  MRN: [id number]  [place] 1955  Date of evaluation: 2022  Treating Resident Physician: Rachana Nelson MD  Supervising Physician: Dr Ajay Martinez       Chief Complaint   Patient presents with    Hip Pain     Left     History obtained from the patient. HISTORY OF PRESENT ILLNESS    HPI  Zachary Apgar is a 74 y.o. female with past medical history of GERD, hypertension, liver disease, COPD, bipolar 1 disorder who presents to the emergency department for evaluation of left hip pain over the last week. She also describes having a fall couple weeks ago when she was walking in her kitchen and cannot recall the entirety of the event is not sure she had a syncopal episodes if she tripped over something. Does remember or believes she remembers hitting her head she is unsure. She also complains of some intermittent left upper abdominal pain/chest wall pain has been present over the last few days is nonexertional nonpleuritic nature with no associate diaphoresis or shortness of breath. The patient has no other acute complaints at this time. REVIEW OF SYSTEMS   Review of Systems   Constitutional:  Negative for chills and fever. HENT:  Negative for rhinorrhea, sinus pain and sore throat. Eyes:  Negative for redness. Respiratory:  Negative for cough and shortness of breath. Cardiovascular:  Positive for chest pain. Gastrointestinal:  Positive for abdominal pain and nausea. Negative for diarrhea and vomiting. Genitourinary:  Negative for dysuria. Musculoskeletal:  Positive for back pain. Left hip pain     Skin:  Negative for rash. Neurological:  Negative for light-headedness and headaches. Psychiatric/Behavioral:  Negative for agitation.         PAST MEDICAL AND SURGICAL HISTORY     Past Medical History:   Diagnosis Date    Acute renal failure with acute cortical necrosis ([address] 12/21/2019    Allergic rhinitis     Arthritis     spine    Bipolar 1 disorder (HCC)     Blood circulation, collateral     Cancer ([address] 2011    cancerous polyps removed - Dr. Joaquin Lewis    Cataract     Colon polyps     COPD (chronic obstructive pulmonary disease) ([address] 7/24/2014    COPD (chronic obstructive pulmonary disease) (HCC)     Coronary vasospasm ([address] 8/17/2019    Depression     Diverticulitis of colon     GERD (gastroesophageal reflux disease)     Glaucoma     Hearing loss     Hiatal hernia     History of arterial ischemic stroke 7/20/2019    History of colonoscopy 2002    History of kidney stones     Hx of blood clots 6/17/2014    PE and collar bone area after shoulder surgery    Hyperlipidemia     Hypertension     Liver disease     enlarged liver - damaged with alcohol in past but no cirrhosis per patient    Pneumonia 7/24/2014    Sexual problems     Suicidal thoughts     2015 admitted to  from [facility]    Thyroid disease     Urinary incontinence     Vomiting     Wears dentures     Wears glasses      Past Surgical History:   Procedure Laterality Date    ABDOMEN SURGERY      x4 removed cysts and ovary removed    CARDIAC SURGERY      heart caths, no stents    CARPAL TUNNEL RELEASE Bilateral 2016    CHOLECYSTECTOMY, LAPAROSCOPIC  6/12/15    Dr. Malaika Bowling N/A 2/10/2022    CYSTOSCOPY URETHRAL IMPLANT INJECTION performed by Rosa Erazo MD at [facility] N/A 7/13/2020    CYSTOSCOPY WITH BLADDER BIOPSIES performed by Jerel English MD at [facility], ESOPHAGUS      EGD      ELBOW SURGERY Right 10/7/2004    Dr. Jonathan Ahmadi Bilateral 2016    decompression    HYSTERECTOMY ([facility]  1998    Dr. Prachi Fall with [facility], [address]  2004, 2014    right shoulder    SHOULDER SURGERY  2014    right    SKIN BIOPSY  2006    under left eye    STIMULATOR SURGERY N/A 11/4/2020    STAGE 1 INTERSTIM PLACEMENT performed by Vidhi Carmona MD at [address] [place] [address] 11/23/2020    PLACEMENT OF STAGE II INTERSTIM performed by Vidhi Carmona MD at [address] [place] [address] 4/14/2022    Removal of Interstim performed by Triston Harris MD at [address]   No current facility-administered medications for this encounter. Current Outpatient Medications:     pantoprazole (PROTONIX) 40 MG tablet, Take 1 tablet by mouth in the morning., Disp: 30 tablet, Rfl: 0    ondansetron (ZOFRAN-ODT) 4 MG disintegrating tablet, Take 1 tablet by mouth 3 times daily as needed for Nausea or Vomiting, Disp: 21 tablet, Rfl: 0    nicotine polacrilex (COMMIT) 4 MG lozenge, Take 1 lozenge by mouth as needed for Smoking cessation (max 20 per day), Disp: 100 each, Rfl: 3    nicotine (NICODERM CQ) 21 MG/24HR, Place 1 patch onto the skin in the morning., Disp: 42 patch, Rfl: 0    Roflumilast (DALIRESP) 500 MCG tablet, Take 1 tablet by mouth daily, Disp: 30 tablet, Rfl: 5    albuterol sulfate HFA (PROVENTIL HFA) 108 (90 Base) MCG/ACT inhaler, Inhale 2 puffs into the lungs every 6 hours as needed for Wheezing, Disp: 18 g, Rfl: 3    nabumetone (RELAFEN) 500 MG tablet, Take 500 mg by mouth 2 times daily, Disp: , Rfl:     polyethylene glycol (GLYCOLAX) 17 GM/SCOOP powder, Colonoscopy Prep Dispense 238 Gram Bottle.   Use as Directed, Disp: 238 g, Rfl: 0    omeprazole (PRILOSEC) 40 MG delayed release capsule, Take 1 capsule by mouth every morning (before breakfast), Disp: 30 capsule, Rfl: 0    ibuprofen (IBU) 600 MG tablet, Take 1 tablet by mouth every 6 hours as needed for Pain, Disp: 40 tablet, Rfl: 0    fluticasone-umeclidin-vilant (TRELEGY ELLIPTA) 100-62.5-25 MCG/INH AEPB, Inhale 1 puff into the lungs daily, Disp: 60 each, Rfl: 5    phenazopyridine (PYRIDIUM) 200 MG tablet, Take 1 tablet by mouth 3 times daily as needed for Pain, Disp: 9 tablet, Rfl: 0    QUEtiapine (SEROQUEL) 300 MG tablet, Take 300 mg by mouth nightly 2 tab, Disp: , Rfl:     carvedilol (COREG) 3.125 MG tablet, TAKE 1 TABLET BY MOUTH TWICE DAILY, Disp: , Rfl:     famotidine (PEPCID) 20 MG tablet, Take 20 mg by mouth daily, Disp: , Rfl:     aspirin 81 MG EC tablet, Take 1 tablet by mouth daily, Disp: 30 tablet, Rfl: 3    nitroGLYCERIN (NITROSTAT) 0.4 MG SL tablet, up to max of 3 total doses.  If no relief after 1 dose, call 911., Disp: 25 tablet, Rfl: 3    hydrOXYzine (VISTARIL) 50 MG capsule, Take 1 capsule by mouth 3 times daily as needed for Itching or Anxiety, Disp: 90 capsule, Rfl: 0    topiramate (TOPAMAX) 50 MG tablet, Take 1 tablet by mouth 2 times daily, Disp: 60 tablet, Rfl: 3    sucralfate (CARAFATE) 1 GM tablet, Take 1 tablet by mouth 4 times daily, Disp: 120 tablet, Rfl: 3    clopidogrel (PLAVIX) 75 MG tablet, Take 75 mg by mouth daily, Disp: , Rfl:     albuterol (PROVENTIL) (2.5 MG/3ML) 0.083% nebulizer solution, Take 3 mLs by nebulization every 6 hours as needed for Wheezing, Disp: 120 each, Rfl: 0    acetaminophen (MAPAP) 325 MG tablet, Take by mouth every 6 hours as needed for Pain 2 tab, Disp: , Rfl:     potassium chloride (KLOR-CON M) 20 MEQ extended release tablet, Take by mouth daily 2 tab, Disp: , Rfl:     levothyroxine (SYNTHROID) 75 MCG tablet, Take 1 tablet by mouth daily everyday except Sunday take 150 mcg., Disp: 30 tablet, Rfl: 0    fluticasone (FLONASE) 50 MCG/ACT nasal spray, 1 spray by Each Nostril route 2 times daily, Disp: 1 Bottle, Rfl: 0    ferrous sulfate 325 (65 Fe) MG tablet, Take 1 tablet by mouth 2 times daily, Disp: 30 tablet, Rfl: 0    folic acid (FOLVITE) 1 MG tablet, Take 1 mg by mouth daily, Disp: , Rfl:     ARIPiprazole (ABILIFY) 2 MG tablet, Take 2 mg by mouth daily, Disp: , Rfl:     atorvastatin (LIPITOR) 40 MG tablet, Take 40 mg by mouth nightly , Disp: , Rfl:     escitalopram (LEXAPRO) 20 MG tablet, Take 30 mg by mouth daily , Disp: , Rfl: fenofibrate 160 MG tablet, Take 160 mg by mouth daily , Disp: , Rfl:     traZODone (DESYREL) 100 MG tablet, Take 2 tablets by mouth nightly (Patient taking differently: Take 150 mg by mouth nightly 2 tabs prn), Disp: 30 tablet, Rfl: 0      SOCIAL HISTORY     Social History     Social History Narrative    Not on file     Social History     Tobacco Use    Smoking status: Every Day     Packs/day: 0.50     Years: 30.00     Pack years: 15.00     Types: Cigarettes     Start date: 4/22/1977    Smokeless tobacco: Never    Tobacco comments:     Trying to quit   Vaping Use    Vaping Use: Never used   Substance Use Topics    Alcohol use: No     Comment: none for 2 years    Drug use: Not Currently     Frequency: 1.0 times per week     Types: Cocaine         ALLERGIES     Allergies   Allergen Reactions    Seasonal Other (See Comments)     sneezing         FAMILY HISTORY     Family History   Problem Relation Age of Onset    Cancer Mother     Heart Disease Mother     High Blood Pressure Mother     High Cholesterol Mother     Cancer Father         brain    Depression Father     Heart Disease Father     High Cholesterol Father     Mental Illness Father     Cancer Sister     Heart Attack Sister     Heart Disease Maternal Uncle     High Cholesterol Maternal Uncle     Diabetes Maternal Grandmother     Heart Disease Maternal Grandmother     High Cholesterol Maternal Grandmother     Early Death Maternal Grandfather     Heart Disease Maternal Grandfather     High Cholesterol Maternal Grandfather     Stroke Maternal Grandfather     Heart Disease Paternal Grandmother     High Cholesterol Paternal Grandmother     Heart Disease Paternal Grandfather     High Cholesterol Paternal Grandfather     Colon Cancer Neg Hx     Inflam Bowel Dis Neg Hx          PREVIOUS RECORDS   Previous records reviewed: Patient was seen on 7/27/2020 for gerd.        PHYSICAL EXAM     ED Triage Vitals [09/28/22 1823]   BP Temp Temp Source Heart Rate Resp SpO2 Height Weight   (!) 151/77 97.8 °F (36.6 °C) Oral 74 18 97 % 5' 4\" (1.626 m) 152 lb (68.9 kg)     Initial vital signs and nursing assessment reviewed and normal. Pulsoximetry is normal per my interpretation. Additional Vital Signs:  Vitals:    09/28/22 1823   BP: (!) 151/77   Pulse: 74   Resp: 18   Temp: 97.8 °F (36.6 °C)   SpO2: 97%       Physical Exam  Vitals and nursing note reviewed. Constitutional:       Appearance: She is obese. She is not toxic-appearing or diaphoretic. HENT:      Head: Normocephalic and atraumatic. Right Ear: External ear normal.      Left Ear: External ear normal.      Nose: Nose normal.      Mouth/Throat:      Mouth: Mucous membranes are moist.      Pharynx: Oropharynx is clear. Eyes:      General: No scleral icterus. Conjunctiva/sclera: Conjunctivae normal.   Cardiovascular:      Rate and Rhythm: Normal rate and regular rhythm. Pulses: Normal pulses. Heart sounds: Normal heart sounds. No murmur heard. Pulmonary:      Effort: Pulmonary effort is normal. No respiratory distress. Breath sounds: Normal breath sounds. No wheezing. Abdominal:      General: Abdomen is flat. There is no distension. Palpations: Abdomen is soft. Tenderness: There is no abdominal tenderness. There is no right CVA tenderness, left CVA tenderness, guarding or rebound. Musculoskeletal:         General: Tenderness present. No swelling or deformity. Normal range of motion. Cervical back: Normal range of motion and neck supple. No rigidity. No muscular tenderness. Comments: Left hip tenderness to palpation. DP pulses 2+ bilaterally PT pulses 2+ bilaterally good capillary refill normal sensation throughout the left lower extremity. Normal flexion and extension of the left lower extremity. Lymphadenopathy:      Cervical: No cervical adenopathy. Skin:     General: Skin is warm and dry. Capillary Refill: Capillary refill takes less than 2 seconds.       Coloration: Skin is not jaundiced. Neurological:      General: No focal deficit present. Mental Status: She is alert and oriented to person, place, and time. Sensory: No sensory deficit. Motor: No weakness. Psychiatric:         Mood and Affect: Mood normal.         Behavior: Behavior normal.         MEDICAL DECISION MAKING      Differential Diagnosis Considered but not limited to:   Fracture, sprain, dislocation, nephrolithiasis, intracranial bleed, metabolic derangement, electrolyte derangement,     Work up performed: CBC, APTT, INR, troponin, CMP, UA, CT head without contrast, CT cervical spine without contrast, x-ray of the left hip. Assessment:   Patient CT cervical spine is negative for any acute fracture CT head is negative for any acute cranial bleed, chest x-ray shows no acute pathology. Left hip x-ray shows avascular necrosis. She is able to ambulate at her typical baseline patient be discharged follow-up with orthopedist in the next few days. No electrolyte derangements or metabolic derangements. Troponin is negative. Heart Score    Heart Score for chest pain patients  History: Slightly Suspicious  ECG: Normal  Patient Age: > 39 and < 65 years  *Risk factors for Atherosclerotic disease: Hypertension, Hypercholesterolemia, Obesity  Risk Factors: > 3 Risk factors or history of atherosclerotic disease*  Troponin: < 1X normal limit  Heart Score Total: 3    HEART Score recommendations:  Score 0 - 3:   <1.7%  risk of MACE over next 6 wks = Outpatient workup  Score 4 - 6: 12-16% risk of MACE over next 6 wks = Admission for observation  Score 7- 10: 50-65% risk of MACE over next 6 wks = Early invasive strategy    MACE: Major Acute Cardiac Event (All Cause Mortality, AMI or revascularization)  Chest Pain in the Emergency Room: A Multicenter Validation of the [address]. Breanna BE, Keith AJ, Quentin MIRLANDE, Mast TP, [employer], Mast EG, Dale SH, [facility], [place] [address] Naty Wilder.  Crit Pathw Cardiol. 2010 Sep; 9(3): 164-169. \"A prospective validation of the HEART score for chest pain patients at the emergency department. \" Int J Cardiol. 2013 Oct 3;168(3):2153-8. doi: 10.1016/j.ijcard. 2013.01.255. Epub 2013 Mar 7. ED RESULTS   Laboratory results:  Labs Reviewed   COMPREHENSIVE METABOLIC PANEL W/ REFLEX TO MG FOR LOW K - Abnormal; Notable for the following components:       Result Value    Total Bilirubin 0.2 (*)     All other components within normal limits   GLOMERULAR FILTRATION RATE, ESTIMATED - Abnormal; Notable for the following components:    Est, Glom Filt Rate 63 (*)     All other components within normal limits   CBC WITH AUTO DIFFERENTIAL   TROPONIN   PROTIME-INR   APTT   ANION GAP   OSMOLALITY   URINALYSIS WITH REFLEX TO CULTURE       Radiologic studies results:  XR CHEST PORTABLE   Final Result   Impression:   No acute abnormalities. This document has been electronically signed by: Tamiko Howe MD on    09/28/2022 07:54 PM      CT Head WO Contrast   Final Result   Impression:   No acute hemorrhage. This document has been electronically signed by: Tamiko Howe MD on    09/28/2022 07:47 PM      All CTs at this facility use dose modulation techniques and iterative    reconstructions, and/or weight-based dosing   when appropriate to reduce radiation to a low as reasonably achievable. CT Cervical Spine WO Contrast   Final Result   Impression:   No acute fractures. This document has been electronically signed by: Tamiko Howe MD on    09/28/2022 07:51 PM      All CTs at this facility use dose modulation techniques and iterative    reconstructions, and/or weight-based dosing   when appropriate to reduce radiation to a low as reasonably achievable. XR HIP 2-3 VW W PELVIS LEFT   Final Result   Impression:   AVN of the femoral heads.       This document has been electronically signed by: Tamiko Howe MD on    09/28/2022 07:23 PM          ED Medications administered this visit:   Medications   HYDROcodone-acetaminophen (NORCO) 5-325 MG per tablet 1 tablet (1 tablet Oral Given 9/28/22 2025)   ondansetron (ZOFRAN-ODT) disintegrating tablet 4 mg (4 mg Oral Given 9/28/22 2017)         ED COURSE     ED Course as of 09/28/22 2025   Wed Sep 28, 2022   1958 XR HIP 2-3 VW W PELVIS LEFT  \"IMPRESSION:  Impression:  AVN of the femoral heads. \" [AL]   1958 CT Head WO Contrast  \"IMPRESSION:  Impression:  No acute hemorrhage. \" [AL]   1958 CT Cervical Spine WO Contrast  \"IMPRESSION:  Impression:  No acute fractures. \" [AL]   1958 XR CHEST PORTABLE  \"IMPRESSION:  Impression:  No acute abnormalities. \" [AL]   2014 Patient was able to ambulate however has become nauseous secondary to the pain she states. She is still not received her pain medication at this time. [AL]   2024 Patient was advised of her lab results and imaging results. She stated that she is no longer having abdominal pain or chest pain she was only having her left hip pain. [AL]      ED Course User Index  [AL] Jim Whitlock MD     Strict return precautions and follow up instructions were discussed with the patient prior to discharge, with which the patient agrees. MEDICATION CHANGES     New Prescriptions    No medications on file         FINAL DISPOSITION     Final diagnoses:   Left hip pain   Avascular necrosis of hip, left (HCC)     Condition: condition: good  Dispo: Dr Renata Scott will be taking over patient's care      This transcription was electronically signed. Parts of this transcriptions may have been dictated by use of voice recognition software and electronically transcribed, and parts may have been transcribed with the assistance of an ED scribe. The transcription may contain errors not detected in proofreading. Please refer to my supervising physician's documentation if my documentation differs.     Electronically Signed: Jim Whitlock MD, 09/28/22, 8:25 PM          Jim Whitlock MD  Resident  09/28/22 2014       Ainsley Carmona MD  Resident  09/28/22 [id number]       Ainsley Carmona MD  Resident  09/28/22 2025

## 2022-09-29 ENCOUNTER — OFFICE VISIT (OUTPATIENT)
Dept: UROLOGY | Age: 67
End: 2022-09-29
Payer: MEDICARE

## 2022-09-29 VITALS
DIASTOLIC BLOOD PRESSURE: 82 MMHG | HEIGHT: 64 IN | WEIGHT: 146 LBS | BODY MASS INDEX: 24.92 KG/M2 | SYSTOLIC BLOOD PRESSURE: 138 MMHG

## 2022-09-29 DIAGNOSIS — N39.490 URINARY INCONTINENCE, OVERFLOW: Primary | ICD-10-CM

## 2022-09-29 DIAGNOSIS — R35.0 URINARY FREQUENCY: ICD-10-CM

## 2022-09-29 DIAGNOSIS — N39.41 URGE INCONTINENCE OF URINE: ICD-10-CM

## 2022-09-29 DIAGNOSIS — N32.81 OAB (OVERACTIVE BLADDER): ICD-10-CM

## 2022-09-29 DIAGNOSIS — N39.3 STRESS INCONTINENCE IN FEMALE: ICD-10-CM

## 2022-09-29 DIAGNOSIS — N20.0 NEPHROLITHIASIS: ICD-10-CM

## 2022-09-29 LAB
EKG ATRIAL RATE: 72 BPM
EKG P AXIS: 60 DEGREES
EKG P-R INTERVAL: 162 MS
EKG Q-T INTERVAL: 408 MS
EKG QRS DURATION: 80 MS
EKG QTC CALCULATION (BAZETT): 446 MS
EKG R AXIS: 6 DEGREES
EKG T AXIS: 52 DEGREES
EKG VENTRICULAR RATE: 72 BPM

## 2022-09-29 PROCEDURE — 99214 OFFICE O/P EST MOD 30 MIN: CPT | Performed by: UROLOGY

## 2022-09-29 PROCEDURE — 93010 ELECTROCARDIOGRAM REPORT: CPT | Performed by: NUCLEAR MEDICINE

## 2022-09-29 PROCEDURE — G8400 PT W/DXA NO RESULTS DOC: HCPCS | Performed by: UROLOGY

## 2022-09-29 PROCEDURE — 3017F COLORECTAL CA SCREEN DOC REV: CPT | Performed by: UROLOGY

## 2022-09-29 PROCEDURE — 4004F PT TOBACCO SCREEN RCVD TLK: CPT | Performed by: UROLOGY

## 2022-09-29 PROCEDURE — G8427 DOCREV CUR MEDS BY ELIG CLIN: HCPCS | Performed by: UROLOGY

## 2022-09-29 PROCEDURE — 1123F ACP DISCUSS/DSCN MKR DOCD: CPT | Performed by: UROLOGY

## 2022-09-29 PROCEDURE — 0509F URINE INCON PLAN DOCD: CPT | Performed by: UROLOGY

## 2022-09-29 PROCEDURE — G8417 CALC BMI ABV UP PARAM F/U: HCPCS | Performed by: UROLOGY

## 2022-09-29 PROCEDURE — 1090F PRES/ABSN URINE INCON ASSESS: CPT | Performed by: UROLOGY

## 2022-09-29 RX ORDER — CIPROFLOXACIN 500 MG/1
500 TABLET, FILM COATED ORAL 2 TIMES DAILY
Qty: 14 TABLET | Refills: 0 | Status: ON HOLD | OUTPATIENT
Start: 2022-09-29

## 2022-09-29 NOTE — DISCHARGE INSTRUCTIONS
Follow-up with [facility] in the next 2 days for further evaluation of her symptoms. Return to the emergency room for any new or worsening symptoms. You may take Motrin and Tylenol as needed for pain control.

## 2022-09-29 NOTE — PROGRESS NOTES
[id number] [address] [facility]  [address]  [facility] [address]  Dept: [phone]  Dept Fax: [phone]  Loc: [address] [facility] Urology Office Note -     Patient:  Mahsa Zambrano  YOB: 1955    The patient is a 74 y.o. female who presents today for evaluation of the following problems:   Chief Complaint   Patient presents with    Follow-up    Nephrolithiasis    Incontinence        History of Present Illness:    Kidney stone  + UTI- resolved  Right flank pain with NO ureteral stone  Has left kidney stone    Incontinence  Had interstim removed by [facility]  Botox 100 u 4/2022 was only 20 percent helpful      CT 7/2022 A nonobstructing calculus in the lower pole of the left kidney measures 8 x 13 mm (series 2, image 31). A simple cyst in the lower pole of the left kidney measuring 9 mm is stable. Requested/reviewed records from [facility], [facility] office and/or outside physician/EMR    (Patient's old records have been requested, reviewed and pertinent findings summarized in today's note.)    Procedures Today: N/A    Last several PSA's:  No results found for: PSA    Last total testosterone:  No results found for: TESTOSTERONE    Urinalysis today:  No results found for this visit on 09/29/22. Last BUN and creatinine:  Lab Results   Component Value Date    BUN 11 09/28/2022     Lab Results   Component Value Date    CREATININE 0.9 09/28/2022       Imaging Reviewed during this Office Visit:   Elizabeth Ferris M.D, MD independently reviewed the images and verified the radiology reports from:    CT ABDOMEN PELVIS W IV CONTRAST Additional Contrast? None    Result Date: 7/21/2022  PROCEDURE: CT ABDOMEN PELVIS W IV CONTRAST CLINICAL INFORMATION: Right upper quadrant and right lower quadrant abdominal pain. COMPARISON: CT abdomen and pelvis 5/18/2020 and 12/21/2019.  TECHNIQUE: Axial 5 mm CT images were obtained through the abdomen and pelvis after the administration of 80  cc Isovue 370 intravenous contrast. Coronal and sagittal reconstructions were obtained. All CT scans at this facility use dose modulation, iterative reconstruction, and/or weight-based dosing when appropriate to reduce radiation dose to as low as reasonably achievable. FINDINGS: Lung bases: Dependent atelectasis is noted at the lung bases. Liver/gallbladder/bilary tree: Hepatomegaly and hepatic steatosis are stable. A 14 mm hypoattenuating lesion in the left hepatic lobe is unchanged. The gallbladder is surgically absent. No biliary ductal dilatation is observed. Pancreas: Normal. Spleen : Normal. Adrenal glands: Normal. Kidneys/ ureters/ bladder: A nonobstructing calculus in the lower pole of the left kidney measures 8 x 13 mm (series 2, image 31). A simple cyst in the lower pole of the left kidney measuring 9 mm is stable. No hydronephrosis or hydroureter is present. The urinary bladder is unremarkable. Gastrointestinal:  The appendix is normal. No bowel obstruction, free fluid, fluid collection, or free air is visualized. A small sliding-type hiatal hernia is stable. A 19 mm fat-containing umbilical hernia is stable. Retroperitoneum / lymph nodes: The aorta is not dilated. No lymphadenopathy is present. Pelvis: The uterus is surgically absent. No adnexal lesions are identified. Musculoskeletal: Diffuse osteopenia is present. Mild chronic arthritic changes are noted in both hips. Multilevel degenerative disc disease is noted in the thoracic and lumbar spine. The visualized skeletal structures appear intact. 1. Normal appendix. No bowel obstruction. No acute findings. 2. Multiple stable chronic findings are discussed. XR CHEST PORTABLE    Result Date: 7/21/2022  PROCEDURE: XR CHEST PORTABLE CLINICAL INFORMATION: cough COMPARISON: 4/26/2022 TECHNIQUE: A single mobile view of the chest was obtained. 1. Normal heart size.  Accessory azygos fissure right apex. 2. No acute findings. No infiltrates or effusions are seen. **This report has been created using voice recognition software. It may contain minor errors which are inherent in voice recognition technology. ** Final report electronically signed by Dr. Elizabeth Loo on 7/21/2022 10:41 AM      PAST MEDICAL, FAMILY AND SOCIAL HISTORY:  Past Medical History:   Diagnosis Date    Acute renal failure with acute cortical necrosis ([address] 12/21/2019    Allergic rhinitis     Arthritis     spine    Bipolar 1 disorder (HCC)     Blood circulation, collateral     Cancer ([address] 2011    cancerous polyps removed - Dr. Bibiana Walden    Cataract     Colon polyps     COPD (chronic obstructive pulmonary disease) ([address] 7/24/2014    COPD (chronic obstructive pulmonary disease) (HCC)     Coronary vasospasm ([facility] 8/17/2019    Depression     Diverticulitis of colon     GERD (gastroesophageal reflux disease)     Glaucoma     Hearing loss     Hiatal hernia     History of arterial ischemic stroke 7/20/2019    History of colonoscopy 2002    History of kidney stones     Hx of blood clots 6/17/2014    PE and collar bone area after shoulder surgery    Hyperlipidemia     Hypertension     Liver disease     enlarged liver - damaged with alcohol in past but no cirrhosis per patient    Pneumonia 7/24/2014    Sexual problems     Suicidal thoughts     2015 admitted to  from [facility]    Thyroid disease     Urinary incontinence     Vomiting     Wears dentures     Wears glasses      Past Surgical History:   Procedure Laterality Date    ABDOMEN SURGERY      x4 removed cysts and ovary removed    CARDIAC SURGERY      heart caths, no stents    CARPAL TUNNEL RELEASE Bilateral 2016    CHOLECYSTECTOMY, LAPAROSCOPIC  6/12/15    Dr. Melva Severe  2011    CYSTOSCOPY N/A 2/10/2022    CYSTOSCOPY URETHRAL IMPLANT INJECTION performed by Marla Mora MD at [address] [place] [address] N/A 7/13/2020    CYSTOSCOPY WITH BLADDER BIOPSIES performed by Veronica Hess MD at [address] [facility], ESOPHAGUS      EGD      [facility] Right 10/7/2004    Dr. Jose Sandoval Bilateral 2016    decompression    HYSTERECTOMY ([facility])  1998    Dr. Corey Salas with [facility] REPAIR  2004, 2014    right shoulder    SHOULDER SURGERY  2014    right    SKIN BIOPSY  2006    under left eye    STIMULATOR SURGERY N/A 11/4/2020    STAGE 1 INTERSTIM PLACEMENT performed by Veronica Hess MD at [facility] 11/23/2020    PLACEMENT OF STAGE II INTERSTIM [facility] N/A 4/14/2022    Removal of Interstim performed by Elicia Aragon MD at [address] History   Problem Relation Age of Onset    Cancer Mother     Heart Disease Mother     High Blood Pressure Mother     High Cholesterol Mother     Cancer Father         brain    Depression Father     Heart Disease Father     High Cholesterol Father     Mental Illness Father     Cancer Sister     Heart Attack Sister     Heart Disease Maternal Uncle     High Cholesterol Maternal Uncle     Diabetes Maternal Grandmother     Heart Disease Maternal Grandmother     High Cholesterol Maternal Grandmother     Early Death Maternal Grandfather     Heart Disease Maternal Grandfather     High Cholesterol Maternal Grandfather     Stroke Maternal Grandfather     Heart Disease Paternal Grandmother     High Cholesterol Paternal Grandmother     Heart Disease Paternal Grandfather     High Cholesterol Paternal Grandfather     Colon Cancer Neg Hx     Inflam Bowel Dis Neg Hx      Outpatient Medications Marked as Taking for the 9/29/22 encounter (Office Visit) with Elicia Aragon MD   Medication Sig Dispense Refill    ciprofloxacin (CIPRO) 500 MG tablet Take 1 tablet by mouth 2 times daily 14 tablet 0       Seasonal  Social History     Tobacco Use   Smoking Status Every Day    Packs/day: 0.50    Years: 30.00    Pack years: 15.00    Types: Cigarettes    Start date: 4/22/1977   Smokeless Tobacco Never   Tobacco Comments    Trying to quit      (If patient a smoker, smoking cessation counseling offered)   Social History     Substance and Sexual Activity   Alcohol Use No    Comment: none for 2 years       REVIEW OF SYSTEMS:  Constitutional: negative  Eyes: negative  Respiratory: negative  Cardiovascular: negative  Gastrointestinal: negative  Genitourinary: see HPI  Musculoskeletal: negative  Skin: negative   Neurological: negative  Hematological/Lymphatic: negative  Psychological: negative        Physical Exam:    This a 74 y.o. female  Vitals:    09/29/22 1113   BP: 138/82     Body mass index is 25.06 kg/m². Constitutional: Patient in no acute distress;         Assessment and Plan        1. Urinary incontinence, overflow    2. Nephrolithiasis    3. OAB (overactive bladder)    4. Urge incontinence of urine    5. Stress incontinence in female    6. Urinary frequency               Plan:      Incontinence stable- bothersome  She would like to get botox redone  Will arrange cystoscopy Botox injection 200u, under MAC  Pre op Ucx  Cipro 1 week prior   Left sided stone will need addressed as outpatient but not contributing to clinical picture at this time  9/2026 KUB 2.7 x 0.9 cm calcification in the left renal pelvis  Will do left ESWL and left stent same time      Prescriptions Ordered:  Orders Placed This Encounter   Medications    ciprofloxacin (CIPRO) 500 MG tablet     Sig: Take 1 tablet by mouth 2 times daily     Dispense:  14 tablet     Refill:  0        Orders Placed:  No orders of the defined types were placed in this encounter.            Erin Coburn M.D, MD

## 2022-11-04 ENCOUNTER — APPOINTMENT (OUTPATIENT)
Dept: GENERAL RADIOLOGY | Age: 67
DRG: 177 | End: 2022-11-04
Payer: MEDICARE

## 2022-11-04 ENCOUNTER — HOSPITAL ENCOUNTER (INPATIENT)
Age: 67
LOS: 4 days | Discharge: HOME | DRG: 177 | End: 2022-11-08
Attending: FAMILY MEDICINE
Payer: MEDICARE

## 2022-11-04 DIAGNOSIS — J44.1 ACUTE EXACERBATION OF CHRONIC OBSTRUCTIVE PULMONARY DISEASE (COPD) (HCC): ICD-10-CM

## 2022-11-04 DIAGNOSIS — J44.1 COPD EXACERBATION (HCC): Primary | ICD-10-CM

## 2022-11-04 LAB
ACINETOBACTER CALCOACETICUS-BAUMANNII BY PCR: NOT DETECTED
ADENOVIRUS BY PCR: NOT DETECTED
ANION GAP SERPL CALCULATED.3IONS-SCNC: 13 MEQ/L (ref 8–16)
BASOPHILS # BLD: 0.3 %
BASOPHILS ABSOLUTE: 0 THOU/MM3 (ref 0–0.1)
BUN BLDV-MCNC: 15 MG/DL (ref 7–22)
CALCIUM SERPL-MCNC: 10 MG/DL (ref 8.5–10.5)
CHLAMYDIA PNEUMONIAE BY PCR: NOT DETECTED
CHLORIDE BLD-SCNC: 101 MEQ/L (ref 98–111)
CO2: 22 MEQ/L (ref 23–33)
CREAT SERPL-MCNC: 0.7 MG/DL (ref 0.4–1.2)
ENTEROBACTER CLOACAE COMPLEX BY PCR: NOT DETECTED
EOSINOPHIL # BLD: 0.4 %
EOSINOPHILS ABSOLUTE: 0.1 THOU/MM3 (ref 0–0.4)
ERYTHROCYTE [DISTWIDTH] IN BLOOD BY AUTOMATED COUNT: 13.7 % (ref 11.5–14.5)
ERYTHROCYTE [DISTWIDTH] IN BLOOD BY AUTOMATED COUNT: 46.5 FL (ref 35–45)
ESCHERICHIA COLI BY PCR: NOT DETECTED
GFR SERPL CREATININE-BSD FRML MDRD: > 60 ML/MIN/1.73M2
GLUCOSE BLD-MCNC: 115 MG/DL (ref 70–108)
HAEMOPHILUS INFLUENZAE BY PCR: NOT DETECTED
HCT VFR BLD CALC: 42.8 % (ref 37–47)
HEMOGLOBIN: 13.7 GM/DL (ref 12–16)
IMMATURE GRANS (ABS): 0.06 THOU/MM3 (ref 0–0.07)
IMMATURE GRANULOCYTES: 0.4 %
INFLUENZA A BY PCR: NOT DETECTED
INFLUENZA A: NOT DETECTED
INFLUENZA B BY PCR: NOT DETECTED
INFLUENZA B: NOT DETECTED
KLEBSIELLA AEROGENES BY PCR: NOT DETECTED
KLEBSIELLA OXYTOCA BY PCR: NOT DETECTED
KLEBSIELLA PNEUMONIAE GROUP BY PCR: NOT DETECTED
LACTIC ACID: 0.7 MMOL/L (ref 0.5–2)
LEGIONELLA PNEUMOPHILIA BY PCR: NOT DETECTED
LYMPHOCYTES # BLD: 11.1 %
LYMPHOCYTES ABSOLUTE: 1.6 THOU/MM3 (ref 1–4.8)
MCH RBC QN AUTO: 29.5 PG (ref 26–33)
MCHC RBC AUTO-ENTMCNC: 32 GM/DL (ref 32.2–35.5)
MCV RBC AUTO: 92.2 FL (ref 81–99)
METAPNEUMOVIRUS BY PCR: NOT DETECTED
MONOCYTES # BLD: 6.5 %
MONOCYTES ABSOLUTE: 0.9 THOU/MM3 (ref 0.4–1.3)
MORAXELLA CATARRHALIS BY PCR: DETECTED
MYCOPLASMA PNEUMONIAE BY PCR: NOT DETECTED
NON-SARS CORONAVIRUS: NOT DETECTED
NUCLEATED RED BLOOD CELLS: 0 /100 WBC
PARAINFLUENZA VIRUS BY PCR: NOT DETECTED
PLATELET # BLD: 273 THOU/MM3 (ref 130–400)
PMV BLD AUTO: 9.5 FL (ref 9.4–12.4)
POTASSIUM REFLEX MAGNESIUM: 4 MEQ/L (ref 3.5–5.2)
PRO-BNP: 1110 PG/ML (ref 0–900)
PROCALCITONIN: 0.11 NG/ML (ref 0.01–0.09)
PROTEUS SPECIES BY PCR: NOT DETECTED
PSEUDOMONAS AERUGINOSA BY PCR: NOT DETECTED
RBC # BLD: 4.64 MILL/MM3 (ref 4.2–5.4)
RESISTANT GENE CTX-M BY PCR: ABNORMAL
RESISTANT GENE IMP BY PCR: ABNORMAL
RESISTANT GENE KPC BY PCR: ABNORMAL
RESISTANT GENE MECA/C & MREJ BY PCR: NOT DETECTED
RESISTANT GENE NDM BY PCR: ABNORMAL
RESISTANT GENE OXA-48-LIKE BY PCR: ABNORMAL
RESISTANT GENE VIM BY PCR: ABNORMAL
RESPIRATORY SYNCYTIAL VIRUS BY PCR: NOT DETECTED
RHINOVIRUS ENTEROVIRUS PCR: NOT DETECTED
SARS-COV-2 RNA, RT PCR: NOT DETECTED
SEG NEUTROPHILS: 81.3 %
SEGMENTED NEUTROPHILS ABSOLUTE COUNT: 11.9 THOU/MM3 (ref 1.8–7.7)
SERRATIA MARCESCENS BY PCR: NOT DETECTED
SODIUM BLD-SCNC: 136 MEQ/L (ref 135–145)
SOURCE: ABNORMAL
SPECIMEN ACCEPTABILITY: ABNORMAL
STAPH AUREUS BY PCR: DETECTED
STREP AGALACTIAE BY PCR: NOT DETECTED
STREP PNEUMONIAE BY PCR: NOT DETECTED
STREP PYOGENES BY PCR: NOT DETECTED
TROPONIN T: 0.01 NG/ML
TROPONIN T: 0.03 NG/ML
WBC # BLD: 14.6 THOU/MM3 (ref 4.8–10.8)

## 2022-11-04 PROCEDURE — 94760 N-INVAS EAR/PLS OXIMETRY 1: CPT

## 2022-11-04 PROCEDURE — 36415 COLL VENOUS BLD VENIPUNCTURE: CPT

## 2022-11-04 PROCEDURE — 96374 THER/PROPH/DIAG INJ IV PUSH: CPT

## 2022-11-04 PROCEDURE — 6360000002 HC RX W HCPCS: Performed by: EMERGENCY MEDICINE

## 2022-11-04 PROCEDURE — 6370000000 HC RX 637 (ALT 250 FOR IP)

## 2022-11-04 PROCEDURE — 93005 ELECTROCARDIOGRAM TRACING: CPT | Performed by: EMERGENCY MEDICINE

## 2022-11-04 PROCEDURE — 99285 EMERGENCY DEPT VISIT HI MDM: CPT

## 2022-11-04 PROCEDURE — 99223 1ST HOSP IP/OBS HIGH 75: CPT

## 2022-11-04 PROCEDURE — 85025 COMPLETE CBC W/AUTO DIFF WBC: CPT

## 2022-11-04 PROCEDURE — 6370000000 HC RX 637 (ALT 250 FOR IP): Performed by: EMERGENCY MEDICINE

## 2022-11-04 PROCEDURE — 83605 ASSAY OF LACTIC ACID: CPT

## 2022-11-04 PROCEDURE — 2580000003 HC RX 258: Performed by: EMERGENCY MEDICINE

## 2022-11-04 PROCEDURE — 84145 PROCALCITONIN (PCT): CPT

## 2022-11-04 PROCEDURE — 2700000000 HC OXYGEN THERAPY PER DAY

## 2022-11-04 PROCEDURE — 87150 DNA/RNA AMPLIFIED PROBE: CPT

## 2022-11-04 PROCEDURE — 83880 ASSAY OF NATRIURETIC PEPTIDE: CPT

## 2022-11-04 PROCEDURE — 87581 M.PNEUMON DNA AMP PROBE: CPT

## 2022-11-04 PROCEDURE — 87541 LEGION PNEUMO DNA AMP PROB: CPT

## 2022-11-04 PROCEDURE — 87636 SARSCOV2 & INF A&B AMP PRB: CPT

## 2022-11-04 PROCEDURE — 2580000003 HC RX 258

## 2022-11-04 PROCEDURE — 94640 AIRWAY INHALATION TREATMENT: CPT

## 2022-11-04 PROCEDURE — 84484 ASSAY OF TROPONIN QUANT: CPT

## 2022-11-04 PROCEDURE — 71045 X-RAY EXAM CHEST 1 VIEW: CPT

## 2022-11-04 PROCEDURE — 1200000003 HC TELEMETRY R&B

## 2022-11-04 PROCEDURE — 87486 CHLMYD PNEUM DNA AMP PROBE: CPT

## 2022-11-04 PROCEDURE — 80048 BASIC METABOLIC PNL TOTAL CA: CPT

## 2022-11-04 PROCEDURE — 87798 DETECT AGENT NOS DNA AMP: CPT

## 2022-11-04 PROCEDURE — 87631 RESP VIRUS 3-5 TARGETS: CPT

## 2022-11-04 RX ORDER — METHYLPREDNISOLONE SODIUM SUCCINATE 125 MG/2ML
80 INJECTION, POWDER, LYOPHILIZED, FOR SOLUTION INTRAMUSCULAR; INTRAVENOUS ONCE
Status: COMPLETED | OUTPATIENT
Start: 2022-11-04 | End: 2022-11-04

## 2022-11-04 RX ORDER — ENOXAPARIN SODIUM 100 MG/ML
40 INJECTION SUBCUTANEOUS EVERY 24 HOURS
Status: DISCONTINUED | OUTPATIENT
Start: 2022-11-05 | End: 2022-11-08 | Stop reason: HOSPADM

## 2022-11-04 RX ORDER — CARVEDILOL 3.12 MG/1
3.12 TABLET ORAL 2 TIMES DAILY
Status: DISCONTINUED | OUTPATIENT
Start: 2022-11-04 | End: 2022-11-08 | Stop reason: HOSPADM

## 2022-11-04 RX ORDER — ASPIRIN 81 MG/1
81 TABLET ORAL DAILY
Status: DISCONTINUED | OUTPATIENT
Start: 2022-11-04 | End: 2022-11-08 | Stop reason: HOSPADM

## 2022-11-04 RX ORDER — SODIUM CHLORIDE 0.9 % (FLUSH) 0.9 %
10 SYRINGE (ML) INJECTION EVERY 12 HOURS SCHEDULED
Status: DISCONTINUED | OUTPATIENT
Start: 2022-11-04 | End: 2022-11-08 | Stop reason: HOSPADM

## 2022-11-04 RX ORDER — MAGNESIUM SULFATE IN WATER 40 MG/ML
2000 INJECTION, SOLUTION INTRAVENOUS PRN
Status: DISCONTINUED | OUTPATIENT
Start: 2022-11-04 | End: 2022-11-08 | Stop reason: HOSPADM

## 2022-11-04 RX ORDER — NICOTINE 21 MG/24HR
1 PATCH, TRANSDERMAL 24 HOURS TRANSDERMAL DAILY
Status: DISCONTINUED | OUTPATIENT
Start: 2022-11-04 | End: 2022-11-08 | Stop reason: HOSPADM

## 2022-11-04 RX ORDER — ALBUTEROL SULFATE 90 UG/1
2 AEROSOL, METERED RESPIRATORY (INHALATION) EVERY 4 HOURS PRN
Status: DISCONTINUED | OUTPATIENT
Start: 2022-11-04 | End: 2022-11-08 | Stop reason: HOSPADM

## 2022-11-04 RX ORDER — TOPIRAMATE 50 MG/1
50 TABLET, FILM COATED ORAL 2 TIMES DAILY
Status: DISCONTINUED | OUTPATIENT
Start: 2022-11-04 | End: 2022-11-05

## 2022-11-04 RX ORDER — IPRATROPIUM BROMIDE AND ALBUTEROL SULFATE 2.5; .5 MG/3ML; MG/3ML
1 SOLUTION RESPIRATORY (INHALATION) 3 TIMES DAILY
Status: DISCONTINUED | OUTPATIENT
Start: 2022-11-05 | End: 2022-11-04 | Stop reason: CLARIF

## 2022-11-04 RX ORDER — PANTOPRAZOLE SODIUM 40 MG/1
40 TABLET, DELAYED RELEASE ORAL
Status: DISCONTINUED | OUTPATIENT
Start: 2022-11-05 | End: 2022-11-08 | Stop reason: HOSPADM

## 2022-11-04 RX ORDER — QUETIAPINE FUMARATE 100 MG/1
300 TABLET, FILM COATED ORAL NIGHTLY
Status: DISCONTINUED | OUTPATIENT
Start: 2022-11-04 | End: 2022-11-08 | Stop reason: HOSPADM

## 2022-11-04 RX ORDER — ONDANSETRON 4 MG/1
4 TABLET, ORALLY DISINTEGRATING ORAL EVERY 8 HOURS PRN
Status: DISCONTINUED | OUTPATIENT
Start: 2022-11-04 | End: 2022-11-08 | Stop reason: HOSPADM

## 2022-11-04 RX ORDER — POTASSIUM CHLORIDE 7.45 MG/ML
10 INJECTION INTRAVENOUS PRN
Status: DISCONTINUED | OUTPATIENT
Start: 2022-11-04 | End: 2022-11-08 | Stop reason: HOSPADM

## 2022-11-04 RX ORDER — ONDANSETRON 2 MG/ML
4 INJECTION INTRAMUSCULAR; INTRAVENOUS EVERY 6 HOURS PRN
Status: DISCONTINUED | OUTPATIENT
Start: 2022-11-04 | End: 2022-11-08 | Stop reason: HOSPADM

## 2022-11-04 RX ORDER — ARIPIPRAZOLE 2 MG/1
2 TABLET ORAL DAILY
Status: DISCONTINUED | OUTPATIENT
Start: 2022-11-04 | End: 2022-11-08 | Stop reason: HOSPADM

## 2022-11-04 RX ORDER — LEVOTHYROXINE SODIUM 0.07 MG/1
75 TABLET ORAL
Status: DISCONTINUED | OUTPATIENT
Start: 2022-11-05 | End: 2022-11-08 | Stop reason: HOSPADM

## 2022-11-04 RX ORDER — FAMOTIDINE 20 MG/1
20 TABLET, FILM COATED ORAL DAILY
Status: DISCONTINUED | OUTPATIENT
Start: 2022-11-04 | End: 2022-11-08 | Stop reason: HOSPADM

## 2022-11-04 RX ORDER — IPRATROPIUM BROMIDE AND ALBUTEROL SULFATE 2.5; .5 MG/3ML; MG/3ML
1 SOLUTION RESPIRATORY (INHALATION)
Status: DISCONTINUED | OUTPATIENT
Start: 2022-11-04 | End: 2022-11-04

## 2022-11-04 RX ORDER — FOLIC ACID 1 MG/1
1 TABLET ORAL DAILY
Status: DISCONTINUED | OUTPATIENT
Start: 2022-11-04 | End: 2022-11-08 | Stop reason: HOSPADM

## 2022-11-04 RX ORDER — ACETAMINOPHEN 325 MG/1
650 TABLET ORAL EVERY 6 HOURS PRN
Status: DISCONTINUED | OUTPATIENT
Start: 2022-11-04 | End: 2022-11-08 | Stop reason: HOSPADM

## 2022-11-04 RX ORDER — IPRATROPIUM BROMIDE AND ALBUTEROL SULFATE 2.5; .5 MG/3ML; MG/3ML
1 SOLUTION RESPIRATORY (INHALATION) ONCE
Status: COMPLETED | OUTPATIENT
Start: 2022-11-04 | End: 2022-11-04

## 2022-11-04 RX ORDER — FERROUS SULFATE 325(65) MG
325 TABLET ORAL 2 TIMES DAILY
Status: DISCONTINUED | OUTPATIENT
Start: 2022-11-04 | End: 2022-11-08 | Stop reason: HOSPADM

## 2022-11-04 RX ORDER — ALBUTEROL SULFATE 90 UG/1
2 AEROSOL, METERED RESPIRATORY (INHALATION) EVERY 6 HOURS PRN
Status: DISCONTINUED | OUTPATIENT
Start: 2022-11-04 | End: 2022-11-04

## 2022-11-04 RX ORDER — PHENAZOPYRIDINE HYDROCHLORIDE 200 MG/1
200 TABLET, FILM COATED ORAL 3 TIMES DAILY PRN
Status: DISCONTINUED | OUTPATIENT
Start: 2022-11-04 | End: 2022-11-08 | Stop reason: HOSPADM

## 2022-11-04 RX ORDER — SODIUM CHLORIDE 0.9 % (FLUSH) 0.9 %
10 SYRINGE (ML) INJECTION PRN
Status: DISCONTINUED | OUTPATIENT
Start: 2022-11-04 | End: 2022-11-08 | Stop reason: HOSPADM

## 2022-11-04 RX ORDER — SODIUM CHLORIDE 9 MG/ML
INJECTION, SOLUTION INTRAVENOUS PRN
Status: DISCONTINUED | OUTPATIENT
Start: 2022-11-04 | End: 2022-11-08 | Stop reason: HOSPADM

## 2022-11-04 RX ORDER — SODIUM CHLORIDE, SODIUM LACTATE, POTASSIUM CHLORIDE, CALCIUM CHLORIDE 600; 310; 30; 20 MG/100ML; MG/100ML; MG/100ML; MG/100ML
INJECTION, SOLUTION INTRAVENOUS ONCE
Status: COMPLETED | OUTPATIENT
Start: 2022-11-04 | End: 2022-11-04

## 2022-11-04 RX ORDER — ROFLUMILAST 500 UG/1
500 TABLET ORAL DAILY
Status: DISCONTINUED | OUTPATIENT
Start: 2022-11-04 | End: 2022-11-08 | Stop reason: HOSPADM

## 2022-11-04 RX ORDER — POTASSIUM CHLORIDE 20 MEQ/1
40 TABLET, EXTENDED RELEASE ORAL PRN
Status: DISCONTINUED | OUTPATIENT
Start: 2022-11-04 | End: 2022-11-08 | Stop reason: HOSPADM

## 2022-11-04 RX ORDER — POLYETHYLENE GLYCOL 3350 17 G/17G
17 POWDER, FOR SOLUTION ORAL DAILY PRN
Status: DISCONTINUED | OUTPATIENT
Start: 2022-11-04 | End: 2022-11-08 | Stop reason: HOSPADM

## 2022-11-04 RX ORDER — FENOFIBRATE 160 MG/1
160 TABLET ORAL DAILY
Status: DISCONTINUED | OUTPATIENT
Start: 2022-11-04 | End: 2022-11-08 | Stop reason: HOSPADM

## 2022-11-04 RX ORDER — CLOPIDOGREL BISULFATE 75 MG/1
75 TABLET ORAL DAILY
Status: DISCONTINUED | OUTPATIENT
Start: 2022-11-04 | End: 2022-11-08 | Stop reason: HOSPADM

## 2022-11-04 RX ORDER — GUAIFENESIN 600 MG/1
600 TABLET, EXTENDED RELEASE ORAL 2 TIMES DAILY
Status: DISCONTINUED | OUTPATIENT
Start: 2022-11-04 | End: 2022-11-08 | Stop reason: HOSPADM

## 2022-11-04 RX ORDER — SUCRALFATE 1 G/1
1 TABLET ORAL 4 TIMES DAILY
Status: DISCONTINUED | OUTPATIENT
Start: 2022-11-04 | End: 2022-11-08 | Stop reason: HOSPADM

## 2022-11-04 RX ORDER — ATORVASTATIN CALCIUM 40 MG/1
40 TABLET, FILM COATED ORAL NIGHTLY
Status: DISCONTINUED | OUTPATIENT
Start: 2022-11-04 | End: 2022-11-08 | Stop reason: HOSPADM

## 2022-11-04 RX ORDER — ALBUTEROL SULFATE 2.5 MG/3ML
2.5 SOLUTION RESPIRATORY (INHALATION) 3 TIMES DAILY
Status: DISCONTINUED | OUTPATIENT
Start: 2022-11-04 | End: 2022-11-05

## 2022-11-04 RX ORDER — ACETAMINOPHEN 650 MG/1
650 SUPPOSITORY RECTAL EVERY 6 HOURS PRN
Status: DISCONTINUED | OUTPATIENT
Start: 2022-11-04 | End: 2022-11-08 | Stop reason: HOSPADM

## 2022-11-04 RX ORDER — PREDNISONE 20 MG/1
40 TABLET ORAL DAILY
Status: DISCONTINUED | OUTPATIENT
Start: 2022-11-05 | End: 2022-11-08 | Stop reason: HOSPADM

## 2022-11-04 RX ADMIN — SUCRALFATE 1 G: 1 TABLET ORAL at 22:28

## 2022-11-04 RX ADMIN — FENOFIBRATE 160 MG: 160 TABLET ORAL at 22:29

## 2022-11-04 RX ADMIN — FOLIC ACID 1 MG: 1 TABLET ORAL at 22:29

## 2022-11-04 RX ADMIN — SODIUM CHLORIDE, PRESERVATIVE FREE 10 ML: 5 INJECTION INTRAVENOUS at 22:29

## 2022-11-04 RX ADMIN — ARIPIPRAZOLE 2 MG: 2 TABLET ORAL at 22:27

## 2022-11-04 RX ADMIN — FERROUS SULFATE TAB 325 MG (65 MG ELEMENTAL FE) 325 MG: 325 (65 FE) TAB at 22:29

## 2022-11-04 RX ADMIN — METHYLPREDNISOLONE SODIUM SUCCINATE 80 MG: 125 INJECTION, POWDER, FOR SOLUTION INTRAMUSCULAR; INTRAVENOUS at 16:54

## 2022-11-04 RX ADMIN — IPRATROPIUM BROMIDE AND ALBUTEROL SULFATE 1 AMPULE: .5; 3 SOLUTION RESPIRATORY (INHALATION) at 21:33

## 2022-11-04 RX ADMIN — ASPIRIN 81 MG: 81 TABLET, COATED ORAL at 22:35

## 2022-11-04 RX ADMIN — GUAIFENESIN 600 MG: 600 TABLET, EXTENDED RELEASE ORAL at 22:28

## 2022-11-04 RX ADMIN — IPRATROPIUM BROMIDE AND ALBUTEROL SULFATE 1 AMPULE: .5; 3 SOLUTION RESPIRATORY (INHALATION) at 17:01

## 2022-11-04 RX ADMIN — ROFLUMILAST 500 MCG: 500 TABLET ORAL at 22:28

## 2022-11-04 RX ADMIN — ATORVASTATIN CALCIUM 40 MG: 40 TABLET, FILM COATED ORAL at 22:26

## 2022-11-04 RX ADMIN — CARVEDILOL 3.12 MG: 3.12 TABLET, FILM COATED ORAL at 22:29

## 2022-11-04 RX ADMIN — SODIUM CHLORIDE, POTASSIUM CHLORIDE, SODIUM LACTATE AND CALCIUM CHLORIDE: 600; 310; 30; 20 INJECTION, SOLUTION INTRAVENOUS at 17:08

## 2022-11-04 RX ADMIN — CLOPIDOGREL BISULFATE 75 MG: 75 TABLET ORAL at 22:35

## 2022-11-04 RX ADMIN — TRAZODONE HYDROCHLORIDE 150 MG: 100 TABLET ORAL at 22:35

## 2022-11-04 RX ADMIN — FAMOTIDINE 20 MG: 20 TABLET ORAL at 22:35

## 2022-11-04 ASSESSMENT — ENCOUNTER SYMPTOMS
NAUSEA: 0
WHEEZING: 1
CONSTIPATION: 0
COLOR CHANGE: 0
CHEST TIGHTNESS: 0
ABDOMINAL PAIN: 0
SORE THROAT: 0
SHORTNESS OF BREATH: 1
RHINORRHEA: 0
COUGH: 1
DIARRHEA: 0
SORE THROAT: 1
CHEST TIGHTNESS: 1
VOMITING: 0

## 2022-11-04 ASSESSMENT — PAIN SCALES - GENERAL
PAINLEVEL_OUTOF10: 8
PAINLEVEL_OUTOF10: 9

## 2022-11-04 ASSESSMENT — PAIN - FUNCTIONAL ASSESSMENT: PAIN_FUNCTIONAL_ASSESSMENT: 0-10

## 2022-11-04 ASSESSMENT — PAIN DESCRIPTION - LOCATION: LOCATION: HEAD

## 2022-11-04 NOTE — ED NOTES
ED to inpatient nurses report    Chief Complaint   Patient presents with    Cough    Shortness of Breath      Present to ED from home  LOC: alert and orientated to name, place, date  Vital signs   Vitals:    11/04/22 1606 11/04/22 1653   BP: 117/66 (!) 119/56   Pulse: 81 76   Resp: 26 25   Temp: 99.5 °F (37.5 °C)    TempSrc: Oral    SpO2: 93% 97%   Weight: 164 lb (74.4 kg)    Height: 5' 4\" (1.626 m)       Oxygen Baseline RA    Current needs required 3 L   LDAs:   Peripheral IV 11/04/22 Left Forearm (Active)   Site Assessment Clean, dry & intact 11/04/22 1653   Line Status Normal saline locked; Flushed 11/04/22 1653   Phlebitis Assessment No symptoms 11/04/22 1653   Infiltration Assessment 0 11/04/22 1653     Mobility: Requires assistance * 1  Pending ED orders: Nonr  Present condition: stable      C-SSRS Risk of Suicide: No Risk  Swallow Screening    Preferred Language: English     Electronically signed by Branden Raymond RN on 11/4/2022 at [address] [place] Enid Merritt RN  11/04/22 3519

## 2022-11-04 NOTE — H&P
Hospitalist - History & Physical      Patient: Yael Carrero    Unit/Bed:03/003A  YOB: 1955  MRN: [id number]   Acct: [de-identified]   PCP: BA Becker CNP    Date of Service: Pt seen/examined on 11/04/22  and Admitted to Inpatient with expected LOS greater than two midnights due to medical therapy. Chief Complaint:  Shortness of breath    Assessment and Plan:  Acute COPD exacerbation:    Pt on 2-3L O2 NC, up from baseline of RA with no documented hypoxia. Afebrile today, however ill appearing. Labs show WBC 14.6, Procal negative, elevated trop and BNP. Covid, Flu A/B negative. PNA panel ordered from ED pending. CXR show bilateral infiltrates. Given Solumedrol 125mg, duoneb, gentle IVF in ED. Respiratory culture ordered. Continue Prednisone 40mg x 5 days, gentle IVF, duonebs, home inhalers ordered. Repeat BMP, CBC in the AM.     Elevated Troponin:    Trop 0.029, elevated BNP today. Pt denies chest pain. EKG shows NSR without acute ischemic changes. Possible secondary to #1 and demand ischemia. Continue to trend x 2 more occurances. Monitor symptoms closely. Hx of HFpEF:    Last ECHO 7/7/2021 reveals EF 60-65% with grade 1 diastolic dysfunction. Pt appears euvolemic today, however BNP elevated (no prior lab values to compare). Continue to monitor for now with daily weights, I&Os, telemetry. Hypothyroidism:    Continue synthroid. Hx of tobacco abuse:    Daily smoker. Educated on smoking cessation. Nicotine patch ordered. Hx of urinary overflow incontinence: Follows with Dr. Demetria Lujan outpatient. History Of Present Illness:    Allison Oquendo is a 75 y/o  female with a PMHx of COPD, urinary incontinence who presents to [facility] ED today for the evaluation of shortness of breath. Pt states her symptoms started about 4 days ago, and has progressively worsened.  She endorses associated body aches, headache, fatigue, lightheadedness, decreased appetite and PO intake x 24 hours, and productive cough. She states she did not take any of her medications yesterday, and has not taken anything to treat her symptoms. Pt  denies fever, chills, chest pain, abdominal pain, dizziness, changes in bowel or urinary habits.        Past Medical History:        Diagnosis Date    Acute renal failure with acute cortical necrosis (HCC) 12/21/2019    Allergic rhinitis     Arthritis     spine    Bipolar 1 disorder (HCC)     Blood circulation, collateral     Cancer ([facility] [address] 2011    cancerous polyps removed - Dr. Joe Caceres    Cataract     Colon polyps     COPD (chronic obstructive pulmonary disease) ([address] 7/24/2014    COPD (chronic obstructive pulmonary disease) (HCC)     Coronary vasospasm ([facility] [address] 8/17/2019    Depression     Diverticulitis of colon     GERD (gastroesophageal reflux disease)     Glaucoma     Hearing loss     Hiatal hernia     History of arterial ischemic stroke 7/20/2019    History of colonoscopy 2002    History of kidney stones     Hx of blood clots 6/17/2014    PE and collar bone area after shoulder surgery    Hyperlipidemia     Hypertension     Liver disease     enlarged liver - damaged with alcohol in past but no cirrhosis per patient    Pneumonia 7/24/2014    Sexual problems     Suicidal thoughts     2015 admitted to [facility] [place]    Thyroid disease     Urinary incontinence     Vomiting     Wears dentures     Wears glasses        Past Surgical History:        Procedure Laterality Date    ABDOMEN SURGERY      x4 removed cysts and ovary removed    CARDIAC SURGERY      heart caths, no stents    CARPAL TUNNEL RELEASE Bilateral 2016    CHOLECYSTECTOMY, LAPAROSCOPIC  6/12/15    Dr. Isac Ramirez N/A 2/10/2022    CYSTOSCOPY URETHRAL IMPLANT INJECTION performed by Lilliam Weaver MD at [address] N/A 7/13/2020    CYSTOSCOPY WITH BLADDER BIOPSIES performed by Lina Anderson MD at [address] [facility] SURGERY Right 10/7/2004    Dr. Joaquín Jason Bilateral 2016    decompression    HYSTERECTOMY ([address] [facility] [address])  1998    Dr. Michael Osman with [address] CUFF REPAIR  2004, 2014    right shoulder    SHOULDER SURGERY  2014    right    SKIN BIOPSY  2006    under left eye    STIMULATOR SURGERY N/A 11/4/2020    STAGE 1 INTERSTIM PLACEMENT performed by Celeste Minaya MD at [facility] 11/23/2020    PLACEMENT OF STAGE II INTERSTIM performed by Celeste Minaya MD at [facility] 4/14/2022    Removal of Interstim performed by Liliane rBoussard MD at [address] [facility] Medications:   No current facility-administered medications on file prior to encounter. Current Outpatient Medications on File Prior to Encounter   Medication Sig Dispense Refill    ciprofloxacin (CIPRO) 500 MG tablet Take 1 tablet by mouth 2 times daily 14 tablet 0    pantoprazole (PROTONIX) 40 MG tablet Take 1 tablet by mouth in the morning. 30 tablet 0    ondansetron (ZOFRAN-ODT) 4 MG disintegrating tablet Take 1 tablet by mouth 3 times daily as needed for Nausea or Vomiting 21 tablet 0    nicotine polacrilex (COMMIT) 4 MG lozenge Take 1 lozenge by mouth as needed for Smoking cessation (max 20 per day) 100 each 3    nicotine (NICODERM CQ) 21 MG/24HR Place 1 patch onto the skin in the morning. 42 patch 0    Roflumilast (DALIRESP) 500 MCG tablet Take 1 tablet by mouth daily 30 tablet 5    albuterol sulfate HFA (PROVENTIL HFA) 108 (90 Base) MCG/ACT inhaler Inhale 2 puffs into the lungs every 6 hours as needed for Wheezing 18 g 3    nabumetone (RELAFEN) 500 MG tablet Take 500 mg by mouth 2 times daily      polyethylene glycol (GLYCOLAX) 17 GM/SCOOP powder Colonoscopy Prep Dispense 238 Gram Bottle.   Use as Directed 238 g 0    omeprazole (PRILOSEC) 40 MG delayed release capsule Take 1 capsule by mouth every morning (before breakfast) 30 capsule 0    ibuprofen (IBU) 600 MG tablet Take 1 tablet by mouth every 6 hours as needed for Pain 40 tablet 0    fluticasone-umeclidin-vilant (TRELEGY ELLIPTA) 100-62.5-25 MCG/INH AEPB Inhale 1 puff into the lungs daily 60 each 5    phenazopyridine (PYRIDIUM) 200 MG tablet Take 1 tablet by mouth 3 times daily as needed for Pain 9 tablet 0    QUEtiapine (SEROQUEL) 300 MG tablet Take 300 mg by mouth nightly 2 tab      carvedilol (COREG) 3.125 MG tablet TAKE 1 TABLET BY MOUTH TWICE DAILY      famotidine (PEPCID) 20 MG tablet Take 20 mg by mouth daily      aspirin 81 MG EC tablet Take 1 tablet by mouth daily 30 tablet 3    nitroGLYCERIN (NITROSTAT) 0.4 MG SL tablet up to max of 3 total doses. If no relief after 1 dose, call 911. 25 tablet 3    hydrOXYzine (VISTARIL) 50 MG capsule Take 1 capsule by mouth 3 times daily as needed for Itching or Anxiety 90 capsule 0    topiramate (TOPAMAX) 50 MG tablet Take 1 tablet by mouth 2 times daily 60 tablet 3    sucralfate (CARAFATE) 1 GM tablet Take 1 tablet by mouth 4 times daily 120 tablet 3    clopidogrel (PLAVIX) 75 MG tablet Take 75 mg by mouth daily      albuterol (PROVENTIL) (2.5 MG/3ML) 0.083% nebulizer solution Take 3 mLs by nebulization every 6 hours as needed for Wheezing 120 each 0    acetaminophen (MAPAP) 325 MG tablet Take by mouth every 6 hours as needed for Pain 2 tab      potassium chloride (KLOR-CON M) 20 MEQ extended release tablet Take by mouth daily 2 tab      levothyroxine (SYNTHROID) 75 MCG tablet Take 1 tablet by mouth daily everyday except Sunday take 150 mcg.  30 tablet 0    fluticasone (FLONASE) 50 MCG/ACT nasal spray 1 spray by Each Nostril route 2 times daily 1 Bottle 0    ferrous sulfate 325 (65 Fe) MG tablet Take 1 tablet by mouth 2 times daily 30 tablet 0    folic acid (FOLVITE) 1 MG tablet Take 1 mg by mouth daily      ARIPiprazole (ABILIFY) 2 MG tablet Take 2 mg by mouth daily      atorvastatin (LIPITOR) 40 MG tablet Take 40 mg by mouth nightly       escitalopram (LEXAPRO) 20 MG tablet Take 30 mg by mouth daily       fenofibrate 160 MG tablet Take 160 mg by mouth daily       traZODone (DESYREL) 100 MG tablet Take 2 tablets by mouth nightly (Patient taking differently: Take 150 mg by mouth nightly 2 tabs prn) 30 tablet 0       Allergies:    Seasonal    Social History:    reports that she has been smoking cigarettes. She started smoking about 45 years ago. She has a 15.00 pack-year smoking history. She has never used smokeless tobacco. She reports that she does not currently use drugs after having used the following drugs: Cocaine. Frequency: 1.00 time per week. She reports that she does not drink alcohol. Family History:       Problem Relation Age of Onset    Cancer Mother     Heart Disease Mother     High Blood Pressure Mother     High Cholesterol Mother     Cancer Father         brain    Depression Father     Heart Disease Father     High Cholesterol Father     Mental Illness Father     Cancer Sister     Heart Attack Sister     Heart Disease Maternal Uncle     High Cholesterol Maternal Uncle     Diabetes Maternal Grandmother     Heart Disease Maternal Grandmother     High Cholesterol Maternal Grandmother     Early Death Maternal Grandfather     Heart Disease Maternal Grandfather     High Cholesterol Maternal Grandfather     Stroke Maternal Grandfather     Heart Disease Paternal Grandmother     High Cholesterol Paternal Grandmother     Heart Disease Paternal Grandfather     High Cholesterol Paternal Grandfather     Colon Cancer Neg Hx     Inflam Bowel Dis Neg Hx        Diet:  No diet orders on file    Review of systems:     Review of Systems   Constitutional:  Positive for activity change and fatigue. Negative for appetite change, chills and fever. HENT:  Negative for congestion, rhinorrhea, sneezing and sore throat. Eyes:  Negative for visual disturbance. Respiratory:  Positive for cough and shortness of breath. Negative for chest tightness.     Cardiovascular:  Negative for chest pain and palpitations. Gastrointestinal:  Negative for abdominal pain, constipation, diarrhea, nausea and vomiting. Genitourinary:  Negative for difficulty urinating, frequency and urgency. Musculoskeletal:  Positive for arthralgias and myalgias. Skin:  Negative for color change and rash. Neurological:  Positive for weakness, light-headedness and headaches. Negative for dizziness. PHYSICAL EXAM:  BP (!) 119/56   Pulse 76   Temp 99.5 °F (37.5 °C) (Oral)   Resp 25   Ht 5' 4\" (1.626 m)   Wt 164 lb (74.4 kg)   SpO2 97%   BMI 28.15 kg/m²   General appearance: Acutely ill appearing. No apparent distress. Appears stated age and cooperative. Skin: Skin color, texture, turgor normal.  No rashes or lesions. HEENT: Normal cephalic, atraumatic without obvious deformity. Pupils equal, round, and reactive to light. Extra-ocular muscles intact. Conjunctivae/corneas clear. Neck: Trachea midline. Supple, with full range of motion. Cardiovascular: Regular rate and rhythm with normal S1/S2. No murmurs, rubs or gallops. Respiratory: On 2L O2 NC. Slightly increased respiratory effort. Inspiratory rhonchi, cleared with cough appreciated in upper and middle lung fields. Expiratory wheeze noted in lung fields bilaterally. Abdomen: Soft, non-tender, non-distended. Normal bowel sounds. Musculoskeletal:  No weakness or instability noted. No edema, erythema, or gross deformity noted. Vascular:  Pulses +2 palpable, equal bilaterally. Neurologic:  Neurovascularly intact without any focal sensory/motor deficits. Cranial nerves: II-XII grossly intact.    Psychiatric: Alert and oriented, thought content appropriate, normal insight      Labs:   Recent Labs     11/04/22  1631   WBC 14.6*   HGB 13.7   HCT 42.8        Recent Labs     11/04/22  1631         CO2 22*   BUN 15   CREATININE 0.7   CALCIUM 10.0     No results for input(s): AST, ALT, BILIDIR, BILITOT, ALKPHOS in the last 72 hours. No results for input(s): INR in the last 72 hours. No results for input(s): Hamp Memory in the last 72 hours. Urinalysis:    Lab Results   Component Value Date/Time    NITRU NEGATIVE 07/28/2022 12:01 AM    WBCUA  07/28/2022 12:01 AM    WBCUA 0-2 04/19/2012 10:10 AM    BACTERIA NONE SEEN 07/28/2022 12:01 AM    RBCUA 0-2 07/28/2022 12:01 AM    BLOODU TRACE 07/28/2022 12:01 AM    SPECGRAV 1.012 02/20/2021 06:30 PM    GLUCOSEU NEGATIVE 07/28/2022 12:01 AM       Radiology:   XR CHEST PORTABLE   Final Result   Prominent interstitial infiltrates are present throughout the lungs on both sides. **This report has been created using voice recognition software. It may contain minor errors which are inherent in voice recognition technology. **      Final report electronically signed by Dr Yvette Torres on 11/4/2022 4:34 PM        XR CHEST PORTABLE    Result Date: 11/4/2022  PROCEDURE: XR CHEST PORTABLE CLINICAL INFORMATION: 47-year-old female with cough and shortness of breath. COMPARISON: Radiographs 9/20/2022. TECHNIQUE: AP upright view of the chest was obtained. FINDINGS: Interstitial opacities are present throughout both lungs. The cardiac silhouette and pulmonary vasculature are within normal limits. There is no significant pleural effusion or pneumothorax. Visualized portions of the upper abdomen are within normal limits. The osseous structures are intact. No acute fractures or suspicious osseous lesions. Prominent interstitial infiltrates are present throughout the lungs on both sides. **This report has been created using voice recognition software. It may contain minor errors which are inherent in voice recognition technology. ** Final report electronically signed by Dr Yvette Torres on 11/4/2022 4:34 PM        EKG:  NSR    Electronically signed by Robert Sorto PA-C on 11/4/2022 at 6:14 PM

## 2022-11-04 NOTE — ED NOTES
PT resting in bed. PT updated on POC. Reparations easy and unlabored. PT is 96% on 3 L NC. PT and VS assessed at this time. PT stable at this time.      Yolanda Braden RN  11/04/22 [id number]

## 2022-11-04 NOTE — ED NOTES
PT resting in bed. PT updated on POC. Respirations slightly labored. SpO2 95 % on 3L NC. Pt denies any needs at this time. PT and VS assessed.  PT stable at this time     Wang Oneil RN  11/04/22 [id number]

## 2022-11-04 NOTE — Clinical Note
Discharge Plan[de-identified] Other/[facility])   Telemetry/Cardiac Monitoring Required?: Yes

## 2022-11-04 NOTE — ED NOTES
PT resting in bed. Respiratory at bedside with breathing treatment. PT medicated per MAR. Warm blanket provided per request. VS and PT assessed at this time. PT stable at this time.      Nicanor Brock RN  11/04/22 [id number]

## 2022-11-04 NOTE — ED TRIAGE NOTES
PT to the ED via [address]. PT states for the last four days he has been coughing up \"green mucous\". PT states she has also had a head ache rated 8/10 and has been feeling short of breath. PT respirations of labored upon arrival and was 88% on room air and PT states she does not wear oxygen at home. PT placed on 3 L and is up to 94%. PT placed on cardiac monitor.

## 2022-11-04 NOTE — ED NOTES
[facility] ENCOUNTER          Pt Name: Kajal Cavanaugh  MRN: [id number]  [place] 1955  Date of evaluation: 11/4/2022  Treating Resident Physician: Zoey Null MD  Supervising Physician: Dr. Jonathan Rothman     History obtained from the patient. CHIEF COMPLAINT       Chief Complaint   Patient presents with    Cough    Shortness of Breath           HISTORY OF PRESENT ILLNESS    HPI  Kajal Cavanaugh is a 74 y.o. female who presents to the emergency department for evaluation of cough and shortness of breath. The patient presented with a cough productive of yellow sputum. She produces approximately 2 teaspoons of sputum when she has coughing episode. She has been coughing for 4 days. She states that no one at home has been sick, and she is also experiencing headache, shortness of breath, dry mouth, wheezing, and chest pain when she coughs. The patient also experiences some lightheadedness and dizziness when she is coughing and short of breath. The patient has no other acute complaints at this time. REVIEW OF SYSTEMS   Review of Systems   Constitutional:  Positive for activity change and fatigue. Negative for chills and fever. HENT:  Positive for postnasal drip and sore throat. Eyes:  Negative for visual disturbance. Respiratory:  Positive for cough, chest tightness, shortness of breath and wheezing. Cardiovascular:  Positive for chest pain. Gastrointestinal:  Negative for abdominal pain, constipation, diarrhea, nausea and vomiting. Endocrine: Negative for polyuria. Genitourinary:  Negative for difficulty urinating, dysuria, hematuria and urgency. Musculoskeletal:  Negative for neck pain and neck stiffness. Skin:  Negative for color change. Neurological:  Positive for dizziness, light-headedness and headaches. Negative for syncope. Psychiatric/Behavioral:  Negative for agitation.         PAST MEDICAL AND SURGICAL HISTORY     Past Medical History:   Diagnosis Date    Acute renal failure with acute cortical necrosis ([address] 12/21/2019    Allergic rhinitis     Arthritis     spine    Bipolar 1 disorder (HCC)     Blood circulation, collateral     Cancer ([address] 2011    cancerous polyps removed - Dr. Elda Macario    Cataract     Colon polyps     COPD (chronic obstructive pulmonary disease) ([address] 7/24/2014    COPD (chronic obstructive pulmonary disease) (HCC)     Coronary vasospasm ([address] 8/17/2019    Depression     Diverticulitis of colon     GERD (gastroesophageal reflux disease)     Glaucoma     Hearing loss     Hiatal hernia     History of arterial ischemic stroke 7/20/2019    History of colonoscopy 2002    History of kidney stones     Hx of blood clots 6/17/2014    PE and collar bone area after shoulder surgery    Hyperlipidemia     Hypertension     Liver disease     enlarged liver - damaged with alcohol in past but no cirrhosis per patient    Pneumonia 7/24/2014    Sexual problems     Suicidal thoughts     2015 admitted to [facility] from [facility]    Thyroid disease     Urinary incontinence     Vomiting     Wears dentures     Wears glasses      Past Surgical History:   Procedure Laterality Date    ABDOMEN SURGERY      x4 removed cysts and ovary removed    CARDIAC SURGERY      heart caths, no stents    CARPAL TUNNEL RELEASE Bilateral 2016    CHOLECYSTECTOMY, LAPAROSCOPIC  6/12/15    Dr. Vincenzo Matias N/A 2/10/2022    CYSTOSCOPY URETHRAL IMPLANT INJECTION performed by Arnol Azul MD at [facility] N/A 7/13/2020    CYSTOSCOPY WITH BLADDER BIOPSIES performed by Ayo Mccormack MD at [address] [place] [address], ESOPHAGUS      EGD      ELBOW SURGERY Right 10/7/2004    Dr. Blanca Sprague Bilateral 2016    decompression    HYSTERECTOMY ([facility])  1998    Dr. Chip Cavazos with [facility]  2004, 2014    right shoulder    SHOULDER SURGERY  2014    right    SKIN BIOPSY  2006    under left eye    STIMULATOR SURGERY N/A 11/4/2020    STAGE 1 INTERSTIM PLACEMENT performed by Becki Lennon MD at [address] [place] [address] 11/23/2020    PLACEMENT OF STAGE II INTERSTIM performed by Becki Lennon MD at [address] [place] [address] 4/14/2022    Removal of Interstim performed by Rudi Vargas MD at [address]   No current facility-administered medications for this encounter. Current Outpatient Medications:     ciprofloxacin (CIPRO) 500 MG tablet, Take 1 tablet by mouth 2 times daily, Disp: 14 tablet, Rfl: 0    pantoprazole (PROTONIX) 40 MG tablet, Take 1 tablet by mouth in the morning., Disp: 30 tablet, Rfl: 0    ondansetron (ZOFRAN-ODT) 4 MG disintegrating tablet, Take 1 tablet by mouth 3 times daily as needed for Nausea or Vomiting, Disp: 21 tablet, Rfl: 0    nicotine polacrilex (COMMIT) 4 MG lozenge, Take 1 lozenge by mouth as needed for Smoking cessation (max 20 per day), Disp: 100 each, Rfl: 3    nicotine (NICODERM CQ) 21 MG/24HR, Place 1 patch onto the skin in the morning., Disp: 42 patch, Rfl: 0    Roflumilast (DALIRESP) 500 MCG tablet, Take 1 tablet by mouth daily, Disp: 30 tablet, Rfl: 5    albuterol sulfate HFA (PROVENTIL HFA) 108 (90 Base) MCG/ACT inhaler, Inhale 2 puffs into the lungs every 6 hours as needed for Wheezing, Disp: 18 g, Rfl: 3    nabumetone (RELAFEN) 500 MG tablet, Take 500 mg by mouth 2 times daily, Disp: , Rfl:     polyethylene glycol (GLYCOLAX) 17 GM/SCOOP powder, Colonoscopy Prep Dispense 238 Gram Bottle.   Use as Directed, Disp: 238 g, Rfl: 0    omeprazole (PRILOSEC) 40 MG delayed release capsule, Take 1 capsule by mouth every morning (before breakfast), Disp: 30 capsule, Rfl: 0    ibuprofen (IBU) 600 MG tablet, Take 1 tablet by mouth every 6 hours as needed for Pain, Disp: 40 tablet, Rfl: 0    fluticasone-umeclidin-vilant (TRELEGY ELLIPTA) 100-62.5-25 MCG/INH AEPB, Inhale 1 puff into the lungs daily, Disp: 60 each, Rfl: 5    phenazopyridine (PYRIDIUM) 200 MG tablet, Take 1 tablet by mouth 3 times daily as needed for Pain, Disp: 9 tablet, Rfl: 0    QUEtiapine (SEROQUEL) 300 MG tablet, Take 300 mg by mouth nightly 2 tab, Disp: , Rfl:     carvedilol (COREG) 3.125 MG tablet, TAKE 1 TABLET BY MOUTH TWICE DAILY, Disp: , Rfl:     famotidine (PEPCID) 20 MG tablet, Take 20 mg by mouth daily, Disp: , Rfl:     aspirin 81 MG EC tablet, Take 1 tablet by mouth daily, Disp: 30 tablet, Rfl: 3    nitroGLYCERIN (NITROSTAT) 0.4 MG SL tablet, up to max of 3 total doses.  If no relief after 1 dose, call 911., Disp: 25 tablet, Rfl: 3    hydrOXYzine (VISTARIL) 50 MG capsule, Take 1 capsule by mouth 3 times daily as needed for Itching or Anxiety, Disp: 90 capsule, Rfl: 0    topiramate (TOPAMAX) 50 MG tablet, Take 1 tablet by mouth 2 times daily, Disp: 60 tablet, Rfl: 3    sucralfate (CARAFATE) 1 GM tablet, Take 1 tablet by mouth 4 times daily, Disp: 120 tablet, Rfl: 3    clopidogrel (PLAVIX) 75 MG tablet, Take 75 mg by mouth daily, Disp: , Rfl:     albuterol (PROVENTIL) (2.5 MG/3ML) 0.083% nebulizer solution, Take 3 mLs by nebulization every 6 hours as needed for Wheezing, Disp: 120 each, Rfl: 0    acetaminophen (MAPAP) 325 MG tablet, Take by mouth every 6 hours as needed for Pain 2 tab, Disp: , Rfl:     potassium chloride (KLOR-CON M) 20 MEQ extended release tablet, Take by mouth daily 2 tab, Disp: , Rfl:     levothyroxine (SYNTHROID) 75 MCG tablet, Take 1 tablet by mouth daily everyday except Sunday take 150 mcg., Disp: 30 tablet, Rfl: 0    fluticasone (FLONASE) 50 MCG/ACT nasal spray, 1 spray by Each Nostril route 2 times daily, Disp: 1 Bottle, Rfl: 0    ferrous sulfate 325 (65 Fe) MG tablet, Take 1 tablet by mouth 2 times daily, Disp: 30 tablet, Rfl: 0    folic acid (FOLVITE) 1 MG tablet, Take 1 mg by mouth daily, Disp: , Rfl:     ARIPiprazole (ABILIFY) 2 MG tablet, Take 2 mg by mouth daily, Disp: , Rfl:     atorvastatin (LIPITOR) 40 MG tablet, Take 40 mg by mouth nightly , Disp: , Rfl:     escitalopram (LEXAPRO) 20 MG tablet, Take 30 mg by mouth daily , Disp: , Rfl:     fenofibrate 160 MG tablet, Take 160 mg by mouth daily , Disp: , Rfl:     traZODone (DESYREL) 100 MG tablet, Take 2 tablets by mouth nightly (Patient taking differently: Take 150 mg by mouth nightly 2 tabs prn), Disp: 30 tablet, Rfl: 0      SOCIAL HISTORY     Social History     Social History Narrative    Not on file     Social History     Tobacco Use    Smoking status: Every Day     Packs/day: 0.50     Years: 30.00     Pack years: 15.00     Types: Cigarettes     Start date: 4/22/1977    Smokeless tobacco: Never    Tobacco comments:     Trying to quit   Vaping Use    Vaping Use: Never used   Substance Use Topics    Alcohol use: No     Comment: none for 2 years    Drug use: Not Currently     Frequency: 1.0 times per week     Types: Cocaine         ALLERGIES     Allergies   Allergen Reactions    Seasonal Other (See Comments)     sneezing         FAMILY HISTORY     Family History   Problem Relation Age of Onset    Cancer Mother     Heart Disease Mother     High Blood Pressure Mother     High Cholesterol Mother     Cancer Father         brain    Depression Father     Heart Disease Father     High Cholesterol Father     Mental Illness Father     Cancer Sister     Heart Attack Sister     Heart Disease Maternal Uncle     High Cholesterol Maternal Uncle     Diabetes Maternal Grandmother     Heart Disease Maternal Grandmother     High Cholesterol Maternal Grandmother     Early Death Maternal Grandfather     Heart Disease Maternal Grandfather     High Cholesterol Maternal Grandfather     Stroke Maternal Grandfather     Heart Disease Paternal Grandmother     High Cholesterol Paternal Grandmother     Heart Disease Paternal Grandfather     High Cholesterol Paternal Grandfather     Colon Cancer Neg Hx     Inflam Bowel Dis Neg Hx          PREVIOUS RECORDS   Previous records reviewed: medical hx. PHYSICAL EXAM     ED Triage Vitals [11/04/22 1606]   BP Temp Temp Source Heart Rate Resp SpO2 Height Weight   117/66 99.5 °F (37.5 °C) Oral 81 26 93 % 5' 4\" (1.626 m) 164 lb (74.4 kg)     Initial vital signs and nursing assessment reviewed and normal. Body mass index is 28.15 kg/m². Pulsoximetry is abnormal per my interpretation - patient is on 2L NC. Additional Vital Signs:  Vitals:    11/04/22 1823   BP: 105/74   Pulse: 80   Resp: 24   Temp:    SpO2: 94%       Physical Exam  Constitutional:       Appearance: She is obese. She is ill-appearing. She is not toxic-appearing or diaphoretic. Comments: Supraclavicular and subcostal retractions. HENT:      Mouth/Throat:      Mouth: Mucous membranes are moist.      Pharynx: Oropharynx is clear. Eyes:      Extraocular Movements: Extraocular movements intact. Pupils: Pupils are equal, round, and reactive to light. Cardiovascular:      Rate and Rhythm: Normal rate and regular rhythm. Pulses: Normal pulses. Heart sounds: Normal heart sounds. Pulmonary:      Effort: Tachypnea and respiratory distress present. Breath sounds: Examination of the right-upper field reveals wheezing. Examination of the left-upper field reveals wheezing. Examination of the right-middle field reveals wheezing. Examination of the left-middle field reveals wheezing. Examination of the right-lower field reveals wheezing. Examination of the left-lower field reveals wheezing. Wheezing present. Chest:      Chest wall: Tenderness present. No crepitus. Abdominal:      Palpations: Abdomen is soft. Tenderness: There is no abdominal tenderness. There is no guarding or rebound. Musculoskeletal:      Cervical back: Normal range of motion and neck supple. Skin:     General: Skin is warm and dry. Capillary Refill: Capillary refill takes 2 to 3 seconds.    Neurological:      Mental Status: She is alert and oriented to person, place, and time. Psychiatric:         Mood and Affect: Mood normal.         Behavior: Behavior normal.           MEDICAL DECISION MAKING   Initial Assessment:   The patient is a 53-year-old female with an extensive past medical history, noting COPD, hypertension, CAD, who presents to the emergency department with 4 days of shortness of breath, productive cough, chest pain. The patient is tachypneic in the 30s, in respiratory distress. Afebrile, blood pressure is on the low side considering she is hypertensive normally.       Ddx: COVID-19, influenza, community-acquired pneumonia, COPD exacerbation from respiratory infection, ACS  Plan:   Chest x-ray, troponins, EKG  CBC, BNP, BMP, lactate, Pro-Issac  COVID-19 and influenza rapid tests  IV fluids, duo nebs, Solu-Medrol        ED RESULTS   Laboratory results:  Labs Reviewed   CBC WITH AUTO DIFFERENTIAL - Abnormal; Notable for the following components:       Result Value    WBC 14.6 (*)     MCHC 32.0 (*)     RDW-SD 46.5 (*)     Segs Absolute 11.9 (*)     All other components within normal limits   PROCALCITONIN - Abnormal; Notable for the following components:    Procalcitonin 0.11 (*)     All other components within normal limits   BASIC METABOLIC PANEL W/ REFLEX TO MG FOR LOW K - Abnormal; Notable for the following components:    CO2 22 (*)     Glucose 115 (*)     All other components within normal limits   BRAIN NATRIURETIC PEPTIDE - Abnormal; Notable for the following components:    Pro-BNP 1110.0 (*)     All other components within normal limits   TROPONIN - Abnormal; Notable for the following components:    Troponin T 0.029 (*)     All other components within normal limits   COVID-19 & INFLUENZA COMBO   PNEUMONIA PANEL, MOLECULAR   LACTIC ACID   GLOMERULAR FILTRATION RATE, ESTIMATED   TROPONIN   TROPONIN       Radiologic studies results:  XR CHEST PORTABLE   Final Result   Prominent interstitial infiltrates are present throughout the lungs on both sides. **This report has been created using voice recognition software. It may contain minor errors which are inherent in voice recognition technology. **      Final report electronically signed by Dr Clyde Barlow on 11/4/2022 4:34 PM          ED Medications administered this visit:   Medications   ipratropium-albuterol (DUONEB) nebulizer solution 1 ampule (1 ampule Inhalation Given 11/4/22 1701)   methylPREDNISolone sodium (SOLU-MEDROL) injection 80 mg (80 mg IntraVENous Given 11/4/22 1654)   lactated ringers infusion ( IntraVENous New Bag 11/4/22 1708)         ED COURSE     Patient was in moderate respiratory distress on arrival, tachypneic to the 30s, retractions, complicated medical history,     Condition improved with supplemental oxygen, duo nebs, Solu-Medrol, however, decision was made to admit the patient for overnight observation. Strict return precautions and follow up instructions were discussed with the patient prior to discharge, with which the patient agrees. MEDICATION CHANGES     New Prescriptions    No medications on file         FINAL DISPOSITION     Final diagnoses:   COPD exacerbation ([facility]     Condition: condition: stable  Dispo: Admit to med/surg floor      This transcription was electronically signed. Parts of this transcriptions may have been dictated by use of voice recognition software and electronically transcribed, and parts may have been transcribed with the assistance of an ED scribe. The transcription may contain errors not detected in proofreading. Please refer to my supervising physician's documentation if my documentation differs.     Electronically Signed: Monique Schwab MD, 11/04/22, 6:25 PM         Monique Schwab MD  Resident  11/04/22 [address] [facility] Shelby Hammer MD  Resident  11/04/22 [address] [place] [address] Theodore Dorantes MD  Resident  11/05/22 5045

## 2022-11-05 LAB
ANION GAP SERPL CALCULATED.3IONS-SCNC: 10 MEQ/L (ref 8–16)
AVERAGE GLUCOSE: 135 MG/DL (ref 70–126)
BASOPHILS # BLD: 0.2 %
BASOPHILS ABSOLUTE: 0 THOU/MM3 (ref 0–0.1)
BUN BLDV-MCNC: 20 MG/DL (ref 7–22)
CALCIUM SERPL-MCNC: 9.6 MG/DL (ref 8.5–10.5)
CHLORIDE BLD-SCNC: 100 MEQ/L (ref 98–111)
CO2: 23 MEQ/L (ref 23–33)
CREAT SERPL-MCNC: 0.7 MG/DL (ref 0.4–1.2)
EKG ATRIAL RATE: 82 BPM
EKG P AXIS: 72 DEGREES
EKG P-R INTERVAL: 142 MS
EKG Q-T INTERVAL: 372 MS
EKG QRS DURATION: 82 MS
EKG QTC CALCULATION (BAZETT): 434 MS
EKG R AXIS: 26 DEGREES
EKG T AXIS: 74 DEGREES
EKG VENTRICULAR RATE: 82 BPM
EOSINOPHIL # BLD: 0 %
EOSINOPHILS ABSOLUTE: 0 THOU/MM3 (ref 0–0.4)
ERYTHROCYTE [DISTWIDTH] IN BLOOD BY AUTOMATED COUNT: 13.3 % (ref 11.5–14.5)
ERYTHROCYTE [DISTWIDTH] IN BLOOD BY AUTOMATED COUNT: 44.1 FL (ref 35–45)
GFR SERPL CREATININE-BSD FRML MDRD: > 60 ML/MIN/1.73M2
GLUCOSE BLD-MCNC: 241 MG/DL (ref 70–108)
GLUCOSE BLD-MCNC: 272 MG/DL (ref 70–108)
GLUCOSE BLD-MCNC: 303 MG/DL (ref 70–108)
GLUCOSE BLD-MCNC: 309 MG/DL (ref 70–108)
GLUCOSE BLD-MCNC: 363 MG/DL (ref 70–108)
HBA1C MFR BLD: 6.5 % (ref 4.4–6.4)
HCT VFR BLD CALC: 39.2 % (ref 37–47)
HEMOGLOBIN: 12.7 GM/DL (ref 12–16)
IMMATURE GRANS (ABS): 0.05 THOU/MM3 (ref 0–0.07)
IMMATURE GRANULOCYTES: 0.5 %
LEGIONELLA PNEUMOPHILIA AG, URINE: NEGATIVE
LYMPHOCYTES # BLD: 11.4 %
LYMPHOCYTES ABSOLUTE: 1.2 THOU/MM3 (ref 1–4.8)
MCH RBC QN AUTO: 29.5 PG (ref 26–33)
MCHC RBC AUTO-ENTMCNC: 32.4 GM/DL (ref 32.2–35.5)
MCV RBC AUTO: 91 FL (ref 81–99)
MONOCYTES # BLD: 3.8 %
MONOCYTES ABSOLUTE: 0.4 THOU/MM3 (ref 0.4–1.3)
NUCLEATED RED BLOOD CELLS: 0 /100 WBC
OSMOLALITY CALCULATION: 280.7 MOSMOL/KG (ref 275–300)
PLATELET # BLD: 245 THOU/MM3 (ref 130–400)
PMV BLD AUTO: 9.9 FL (ref 9.4–12.4)
POTASSIUM REFLEX MAGNESIUM: 4.2 MEQ/L (ref 3.5–5.2)
RBC # BLD: 4.31 MILL/MM3 (ref 4.2–5.4)
SEG NEUTROPHILS: 84.1 %
SEGMENTED NEUTROPHILS ABSOLUTE COUNT: 8.6 THOU/MM3 (ref 1.8–7.7)
SODIUM BLD-SCNC: 133 MEQ/L (ref 135–145)
STREP PNEUMO AG, UR: NEGATIVE
TROPONIN T: < 0.01 NG/ML
WBC # BLD: 10.2 THOU/MM3 (ref 4.8–10.8)

## 2022-11-05 PROCEDURE — 94669 MECHANICAL CHEST WALL OSCILL: CPT

## 2022-11-05 PROCEDURE — 87077 CULTURE AEROBIC IDENTIFY: CPT

## 2022-11-05 PROCEDURE — 6360000002 HC RX W HCPCS

## 2022-11-05 PROCEDURE — 87449 NOS EACH ORGANISM AG IA: CPT

## 2022-11-05 PROCEDURE — 36415 COLL VENOUS BLD VENIPUNCTURE: CPT

## 2022-11-05 PROCEDURE — 6360000002 HC RX W HCPCS: Performed by: NURSE PRACTITIONER

## 2022-11-05 PROCEDURE — 6370000000 HC RX 637 (ALT 250 FOR IP)

## 2022-11-05 PROCEDURE — 2580000003 HC RX 258

## 2022-11-05 PROCEDURE — 1200000003 HC TELEMETRY R&B

## 2022-11-05 PROCEDURE — 99233 SBSQ HOSP IP/OBS HIGH 50: CPT | Performed by: NURSE PRACTITIONER

## 2022-11-05 PROCEDURE — 87205 SMEAR GRAM STAIN: CPT

## 2022-11-05 PROCEDURE — 83036 HEMOGLOBIN GLYCOSYLATED A1C: CPT

## 2022-11-05 PROCEDURE — 2700000000 HC OXYGEN THERAPY PER DAY

## 2022-11-05 PROCEDURE — 85025 COMPLETE CBC W/AUTO DIFF WBC: CPT

## 2022-11-05 PROCEDURE — 87070 CULTURE OTHR SPECIMN AEROBIC: CPT

## 2022-11-05 PROCEDURE — 82948 REAGENT STRIP/BLOOD GLUCOSE: CPT

## 2022-11-05 PROCEDURE — 93010 ELECTROCARDIOGRAM REPORT: CPT | Performed by: INTERNAL MEDICINE

## 2022-11-05 PROCEDURE — 6370000000 HC RX 637 (ALT 250 FOR IP): Performed by: NURSE PRACTITIONER

## 2022-11-05 PROCEDURE — 84484 ASSAY OF TROPONIN QUANT: CPT

## 2022-11-05 PROCEDURE — 80048 BASIC METABOLIC PNL TOTAL CA: CPT

## 2022-11-05 PROCEDURE — 94760 N-INVAS EAR/PLS OXIMETRY 1: CPT

## 2022-11-05 PROCEDURE — 2580000003 HC RX 258: Performed by: NURSE PRACTITIONER

## 2022-11-05 PROCEDURE — 87899 AGENT NOS ASSAY W/OPTIC: CPT

## 2022-11-05 PROCEDURE — 94640 AIRWAY INHALATION TREATMENT: CPT

## 2022-11-05 RX ORDER — AZITHROMYCIN 250 MG/1
250 TABLET, FILM COATED ORAL DAILY
Status: DISCONTINUED | OUTPATIENT
Start: 2022-11-05 | End: 2022-11-08 | Stop reason: HOSPADM

## 2022-11-05 RX ORDER — ALPRAZOLAM 0.25 MG/1
0.25 TABLET ORAL 3 TIMES DAILY PRN
Status: DISCONTINUED | OUTPATIENT
Start: 2022-11-05 | End: 2022-11-08 | Stop reason: HOSPADM

## 2022-11-05 RX ORDER — ALBUTEROL SULFATE 2.5 MG/3ML
2.5 SOLUTION RESPIRATORY (INHALATION) 2 TIMES DAILY
Status: DISCONTINUED | OUTPATIENT
Start: 2022-11-05 | End: 2022-11-08 | Stop reason: HOSPADM

## 2022-11-05 RX ORDER — DEXTROSE MONOHYDRATE 100 MG/ML
INJECTION, SOLUTION INTRAVENOUS CONTINUOUS PRN
Status: DISCONTINUED | OUTPATIENT
Start: 2022-11-05 | End: 2022-11-08 | Stop reason: HOSPADM

## 2022-11-05 RX ORDER — INSULIN LISPRO 100 [IU]/ML
0-8 INJECTION, SOLUTION INTRAVENOUS; SUBCUTANEOUS
Status: DISCONTINUED | OUTPATIENT
Start: 2022-11-05 | End: 2022-11-06

## 2022-11-05 RX ORDER — INSULIN LISPRO 100 [IU]/ML
0-4 INJECTION, SOLUTION INTRAVENOUS; SUBCUTANEOUS NIGHTLY
Status: DISCONTINUED | OUTPATIENT
Start: 2022-11-05 | End: 2022-11-06

## 2022-11-05 RX ADMIN — QUETIAPINE FUMARATE 300 MG: 100 TABLET ORAL at 21:56

## 2022-11-05 RX ADMIN — PANTOPRAZOLE SODIUM 40 MG: 40 TABLET, DELAYED RELEASE ORAL at 06:06

## 2022-11-05 RX ADMIN — FENOFIBRATE 160 MG: 160 TABLET ORAL at 09:44

## 2022-11-05 RX ADMIN — SUCRALFATE 1 G: 1 TABLET ORAL at 18:09

## 2022-11-05 RX ADMIN — ASPIRIN 81 MG: 81 TABLET, COATED ORAL at 09:41

## 2022-11-05 RX ADMIN — ENOXAPARIN SODIUM 40 MG: 100 INJECTION SUBCUTANEOUS at 08:37

## 2022-11-05 RX ADMIN — CARVEDILOL 3.12 MG: 3.12 TABLET, FILM COATED ORAL at 09:43

## 2022-11-05 RX ADMIN — MOMETASONE FUROATE AND FORMOTEROL FUMARATE DIHYDRATE 2 PUFF: 200; 5 AEROSOL RESPIRATORY (INHALATION) at 20:39

## 2022-11-05 RX ADMIN — ALBUTEROL SULFATE 2.5 MG: 2.5 SOLUTION RESPIRATORY (INHALATION) at 20:39

## 2022-11-05 RX ADMIN — AZITHROMYCIN DIHYDRATE 250 MG: 500 INJECTION, POWDER, LYOPHILIZED, FOR SOLUTION INTRAVENOUS at 02:58

## 2022-11-05 RX ADMIN — LEVOTHYROXINE SODIUM 75 MCG: 0.07 TABLET ORAL at 06:06

## 2022-11-05 RX ADMIN — SUCRALFATE 1 G: 1 TABLET ORAL at 09:46

## 2022-11-05 RX ADMIN — CARVEDILOL 3.12 MG: 3.12 TABLET, FILM COATED ORAL at 21:12

## 2022-11-05 RX ADMIN — TIOTROPIUM BROMIDE INHALATION SPRAY 2 PUFF: 3.12 SPRAY, METERED RESPIRATORY (INHALATION) at 09:04

## 2022-11-05 RX ADMIN — FOLIC ACID 1 MG: 1 TABLET ORAL at 09:45

## 2022-11-05 RX ADMIN — ARIPIPRAZOLE 2 MG: 2 TABLET ORAL at 09:41

## 2022-11-05 RX ADMIN — ALBUTEROL SULFATE 2.5 MG: 2.5 SOLUTION RESPIRATORY (INHALATION) at 09:04

## 2022-11-05 RX ADMIN — AZITHROMYCIN MONOHYDRATE 250 MG: 250 TABLET ORAL at 09:42

## 2022-11-05 RX ADMIN — INSULIN LISPRO 4 UNITS: 100 INJECTION, SOLUTION INTRAVENOUS; SUBCUTANEOUS at 21:22

## 2022-11-05 RX ADMIN — ALPRAZOLAM 0.25 MG: 0.25 TABLET ORAL at 18:10

## 2022-11-05 RX ADMIN — FERROUS SULFATE TAB 325 MG (65 MG ELEMENTAL FE) 325 MG: 325 (65 FE) TAB at 21:12

## 2022-11-05 RX ADMIN — INSULIN LISPRO 2 UNITS: 100 INJECTION, SOLUTION INTRAVENOUS; SUBCUTANEOUS at 12:04

## 2022-11-05 RX ADMIN — GUAIFENESIN 600 MG: 600 TABLET, EXTENDED RELEASE ORAL at 09:45

## 2022-11-05 RX ADMIN — CEFTRIAXONE SODIUM 1000 MG: 1 INJECTION, POWDER, FOR SOLUTION INTRAMUSCULAR; INTRAVENOUS at 15:34

## 2022-11-05 RX ADMIN — CLOPIDOGREL BISULFATE 75 MG: 75 TABLET ORAL at 09:43

## 2022-11-05 RX ADMIN — FERROUS SULFATE TAB 325 MG (65 MG ELEMENTAL FE) 325 MG: 325 (65 FE) TAB at 09:44

## 2022-11-05 RX ADMIN — PREDNISONE 40 MG: 20 TABLET ORAL at 09:45

## 2022-11-05 RX ADMIN — SUCRALFATE 1 G: 1 TABLET ORAL at 21:10

## 2022-11-05 RX ADMIN — INSULIN LISPRO 8 UNITS: 100 INJECTION, SOLUTION INTRAVENOUS; SUBCUTANEOUS at 18:01

## 2022-11-05 RX ADMIN — VANCOMYCIN HYDROCHLORIDE 1250 MG: 5 INJECTION, POWDER, LYOPHILIZED, FOR SOLUTION INTRAVENOUS at 04:30

## 2022-11-05 RX ADMIN — FAMOTIDINE 20 MG: 20 TABLET ORAL at 09:44

## 2022-11-05 RX ADMIN — ACETAMINOPHEN 650 MG: 325 TABLET ORAL at 13:26

## 2022-11-05 RX ADMIN — TRAZODONE HYDROCHLORIDE 150 MG: 100 TABLET ORAL at 21:10

## 2022-11-05 RX ADMIN — ROFLUMILAST 500 MCG: 500 TABLET ORAL at 09:46

## 2022-11-05 RX ADMIN — GUAIFENESIN 600 MG: 600 TABLET, EXTENDED RELEASE ORAL at 21:12

## 2022-11-05 RX ADMIN — SUCRALFATE 1 G: 1 TABLET ORAL at 13:13

## 2022-11-05 RX ADMIN — SODIUM CHLORIDE, PRESERVATIVE FREE 10 ML: 5 INJECTION INTRAVENOUS at 19:35

## 2022-11-05 RX ADMIN — ATORVASTATIN CALCIUM 40 MG: 40 TABLET, FILM COATED ORAL at 21:12

## 2022-11-05 ASSESSMENT — PAIN DESCRIPTION - LOCATION
LOCATION: HEAD

## 2022-11-05 ASSESSMENT — PAIN SCALES - GENERAL
PAINLEVEL_OUTOF10: 5

## 2022-11-05 ASSESSMENT — PAIN DESCRIPTION - DESCRIPTORS: DESCRIPTORS: SQUEEZING

## 2022-11-05 ASSESSMENT — ENCOUNTER SYMPTOMS: COLOR CHANGE: 0

## 2022-11-05 NOTE — PLAN OF CARE
Problem: Respiratory - Adult  Goal: Clear lung sounds  Description: Clear lung sounds  11/5/2022 0914 by Aissatou Vaughan RCP  Outcome: Progressing  Note: Patient agrees on goals None

## 2022-11-05 NOTE — RT PROTOCOL NOTE
RT Inhaler-Nebulizer Bronchodilator Protocol Note    There is a bronchodilator order in the chart from a provider indicating to follow the RT Bronchodilator Protocol and there is an Initiate RT Inhaler-Nebulizer Bronchodilator Protocol order as well (see protocol at bottom of note). CXR Findings:  XR CHEST PORTABLE    Result Date: 11/4/2022  Prominent interstitial infiltrates are present throughout the lungs on both sides. **This report has been created using voice recognition software. It may contain minor errors which are inherent in voice recognition technology. ** Final report electronically signed by Dr Sedonia Nyhan on 11/4/2022 4:34 PM      The findings from the last RT Protocol Assessment were as follows:   History Pulmonary Disease: Chronic pulmonary disease  Respiratory Pattern: Regular pattern and RR 12-20 bpm  Breath Sounds: Slightly diminished and/or crackles  Cough: Strong, spontaneous, non-productive  Indication for Bronchodilator Therapy: Decreased or absent breath sounds, On home bronchodilators (takes prn at home patient said uses once maybe twice a day treatments)  Bronchodilator Assessment Score: 4    Aerosolized bronchodilator medication orders have been revised according to the RT Inhaler-Nebulizer Bronchodilator Protocol below. Respiratory Therapist to perform RT Therapy Protocol Assessment initially then follow the protocol. Repeat RT Therapy Protocol Assessment PRN for score 0-3 or on second treatment, BID, and PRN for scores above 3. No Indications - adjust the frequency to every 6 hours PRN wheezing or bronchospasm, if no treatments needed after 48 hours then discontinue using Per Protocol order mode. If indication present, adjust the RT bronchodilator orders based on the Bronchodilator Assessment Score as indicated below.   Use Inhaler orders unless patient has one or more of the following: on home nebulizer, not able to hold breath for 10 seconds, is not alert and oriented, cannot activate and use MDI correctly, or respiratory rate 25 breaths per minute or more, then use the equivalent nebulizer order(s) with same Frequency and PRN reasons based on the score. If a patient is on this medication at home then do not decrease Frequency below that used at home. 0-3 - enter or revise RT bronchodilator order(s) to equivalent RT Bronchodilator order with Frequency of every 4 hours PRN for wheezing or increased work of breathing using Per Protocol order mode. 4-6 - enter or revise RT Bronchodilator order(s) to two equivalent RT bronchodilator orders with one order with BID Frequency and one order with Frequency of every 4 hours PRN wheezing or increased work of breathing using Per Protocol order mode. 7-10 - enter or revise RT Bronchodilator order(s) to two equivalent RT bronchodilator orders with one order with TID Frequency and one order with Frequency of every 4 hours PRN wheezing or increased work of breathing using Per Protocol order mode. 11-13 - enter or revise RT Bronchodilator order(s) to one equivalent RT bronchodilator order with QID Frequency and an Albuterol order with Frequency of every 4 hours PRN wheezing or increased work of breathing using Per Protocol order mode. Greater than 13 - enter or revise RT Bronchodilator order(s) to one equivalent RT bronchodilator order with every 4 hours Frequency and an Albuterol order with Frequency of every 2 hours PRN wheezing or increased work of breathing using Per Protocol order mode. RT to enter RT Home Evaluation for COPD & MDI Assessment order using Per Protocol order mode.     Electronically signed by Maia Hsu RCP on 11/5/2022 at 9:12 AM

## 2022-11-05 NOTE — RT PROTOCOL NOTE
RT Inhaler-Nebulizer Bronchodilator Protocol Note    There is a bronchodilator order in the chart from a provider indicating to follow the RT Bronchodilator Protocol and there is an Initiate RT Inhaler-Nebulizer Bronchodilator Protocol order as well (see protocol at bottom of note). CXR Findings:  XR CHEST PORTABLE    Result Date: 11/4/2022  Prominent interstitial infiltrates are present throughout the lungs on both sides. **This report has been created using voice recognition software. It may contain minor errors which are inherent in voice recognition technology. ** Final report electronically signed by Dr Kathryn Beck on 11/4/2022 4:34 PM      The findings from the last RT Protocol Assessment were as follows:   History Pulmonary Disease: Chronic pulmonary disease  Respiratory Pattern: Regular pattern and RR 12-20 bpm  Breath Sounds: Slightly diminished and/or crackles  Cough: Strong, spontaneous, non-productive  Indication for Bronchodilator Therapy: Decreased or absent breath sounds, On home bronchodilators  Bronchodilator Assessment Score: 4    Aerosolized bronchodilator medication orders have been revised according to the RT Inhaler-Nebulizer Bronchodilator Protocol below. Respiratory Therapist to perform RT Therapy Protocol Assessment initially then follow the protocol. Repeat RT Therapy Protocol Assessment PRN for score 0-3 or on second treatment, BID, and PRN for scores above 3. No Indications - adjust the frequency to every 6 hours PRN wheezing or bronchospasm, if no treatments needed after 48 hours then discontinue using Per Protocol order mode. If indication present, adjust the RT bronchodilator orders based on the Bronchodilator Assessment Score as indicated below.   Use Inhaler orders unless patient has one or more of the following: on home nebulizer, not able to hold breath for 10 seconds, is not alert and oriented, cannot activate and use MDI correctly, or respiratory rate 25 breaths per minute or more, then use the equivalent nebulizer order(s) with same Frequency and PRN reasons based on the score. If a patient is on this medication at home then do not decrease Frequency below that used at home. 0-3 - enter or revise RT bronchodilator order(s) to equivalent RT Bronchodilator order with Frequency of every 4 hours PRN for wheezing or increased work of breathing using Per Protocol order mode. 4-6 - enter or revise RT Bronchodilator order(s) to two equivalent RT bronchodilator orders with one order with BID Frequency and one order with Frequency of every 4 hours PRN wheezing or increased work of breathing using Per Protocol order mode. 7-10 - enter or revise RT Bronchodilator order(s) to two equivalent RT bronchodilator orders with one order with TID Frequency and one order with Frequency of every 4 hours PRN wheezing or increased work of breathing using Per Protocol order mode. 11-13 - enter or revise RT Bronchodilator order(s) to one equivalent RT bronchodilator order with QID Frequency and an Albuterol order with Frequency of every 4 hours PRN wheezing or increased work of breathing using Per Protocol order mode. Greater than 13 - enter or revise RT Bronchodilator order(s) to one equivalent RT bronchodilator order with every 4 hours Frequency and an Albuterol order with Frequency of every 2 hours PRN wheezing or increased work of breathing using Per Protocol order mode. RT to enter RT Home Evaluation for COPD & MDI Assessment order using Per Protocol order mode.     Electronically signed by Yaneth Joseph RCP on 11/5/2022 at 9:09 AM

## 2022-11-05 NOTE — PLAN OF CARE
Problem: Respiratory - Adult  Goal: Clear lung sounds  Description: Clear lung sounds  Outcome: Progressing     Problem: Respiratory - Adult  Goal: Levels of oxygenation will improve  Description: Levels of oxygenation will improve  Outcome: Progressing   Patient mutually agrees to goals.

## 2022-11-05 NOTE — PROGRESS NOTES
Hospitalist Progress Note    Patient:  Geri Lopez      Unit/Bed:8A-06/006-A    YOB: 1955    MRN: [id number]       Acct: [de-identified]     PCP: BA Addison CNP    Date of Admission: 11/4/2022    Assessment/Plan:    Acute COPD exacerbation--on Proventil nebulizers twice a day, Spiriva; received Solu-Medrol 125 mg on 11/4 and started on prednisone 40 mg daily from 11/5; attempt to wean oxygen, patient states she quit smoking 4 days ago  Possible bilateral pneumonia (POA), likely secondary to gram-negative bacilli--Zithromax 11/4, will stop vancomycin and add Rocephin from 11/5; has incentive spirometry and Acapella  Elevated troponin--trended down to normal, likely demand mismatch  Acute hyperglycemia--likely secondary to steroids, hemoglobin A1c noted to be 6.5, possibly secondary to steroids; add medium dose Humalog sliding scale along with hypoglycemia protocol, monitor  Leukocytosis--resulted in normal  Essential hypertension, uncontrolled--Coreg, monitor  Chronic diastolic heart failure--on Coreg  Hypothyroidism--treated with Synthroid  Tobacco abuse--cessation counseling; NicoDerm 14 mg topically daily  History of urinary overflow incontinence      Expected discharge date: Approximately 2 days    Disposition:    [x] Home       [] TCU       [] Rehab       [] Psych       [] SNF       [] [place]       [] Other-    Chief Complaint: Shortness of breath    Hospital Course: Per H&P done 11/4/2022: Piyush Salinas is a 73 y/o  female with a PMHx of COPD, urinary incontinence who presents to [facility] ED today for the evaluation of shortness of breath. Pt states her symptoms started about 4 days ago, and has progressively worsened. She endorses associated body aches, headache, fatigue, lightheadedness, decreased appetite and PO intake x 24 hours, and productive cough.  She states she did not take any of her medications yesterday, and has not taken anything to treat her symptoms. Pt  denies fever, chills, chest pain, abdominal pain, dizziness, changes in bowel or urinary habits. \"    11/5--> hemodynamically stable, satting 95% on 3 L of oxygen; sugar was quite high so hemoglobin A1c was checked and noted to be 6.5     Subjective (past 24 hours): States she feels terrible\" rough\", she states she is short of breath, does not use any oxygen at home, states she is lightheaded, states she has productive cough bringing up yellow sputum for the past 2 days, relates to pain across her entire chest with deep inspiration, she denies nausea, she is eating without any issues, she states she quit smoking 4 days ago    Medications:  Reviewed    Infusion Medications    sodium chloride       Scheduled Medications    vancomycin (VANCOCIN) intermittent dosing (placeholder)   Other RX Placeholder    vancomycin  1,250 mg IntraVENous Q18H    ARIPiprazole  2 mg Oral Daily    aspirin  81 mg Oral Daily    atorvastatin  40 mg Oral Nightly    carvedilol  3.125 mg Oral BID    clopidogrel  75 mg Oral Daily    escitalopram  30 mg Oral Daily    famotidine  20 mg Oral Daily    fenofibrate  160 mg Oral Daily    ferrous sulfate  325 mg Oral BID    folic acid  1 mg Oral Daily    levothyroxine  75 mcg Oral QAM AC    pantoprazole  40 mg Oral QAM AC    QUEtiapine  300 mg Oral Nightly    Roflumilast  500 mcg Oral Daily    sucralfate  1 g Oral 4x Daily    topiramate  50 mg Oral BID    traZODone  150 mg Oral Nightly    sodium chloride flush  10 mL IntraVENous 2 times per day    enoxaparin  40 mg SubCUTAneous Q24H    nicotine  1 patch TransDERmal Daily    guaiFENesin  600 mg Oral BID    predniSONE  40 mg Oral Daily    mometasone-formoterol  2 puff Inhalation BID    tiotropium  2 puff Inhalation Daily    albuterol  2.5 mg Nebulization TID    azithromycin  250 mg IntraVENous Q24H     PRN Meds: phenazopyridine, sodium chloride flush, sodium chloride, ondansetron **OR** ondansetron, polyethylene glycol, acetaminophen **OR** acetaminophen, potassium chloride **OR** potassium alternative oral replacement **OR** potassium chloride, magnesium sulfate, albuterol sulfate HFA      Intake/Output Summary (Last 24 hours) at 11/5/2022 9424  Last data filed at 11/4/2022 2116  Gross per 24 hour   Intake 300 ml   Output --   Net 300 ml       Diet:  ADULT DIET; Regular    Exam:  BP (!) 110/52   Pulse 63   Temp 97.7 °F (36.5 °C) (Oral)   Resp 16   Ht 5' 4\" (1.626 m)   Wt 164 lb (74.4 kg)   SpO2 95%   BMI 28.15 kg/m²     General appearance: No apparent distress, appears stated age and cooperative. HEENT: Pupils equal, round, and reactive to light. Conjunctivae/corneas clear. Neck: Supple, with full range of motion. No jugular venous distention. Trachea midline. Respiratory:  Normal respiratory effort. Diminished to auscultation, bilaterally with rhonchi. Speaks in complete sentences  Cardiovascular: Regular rate and rhythm with normal S1/S2 without murmurs, rubs or gallops. Abdomen: Soft, non-tender, non-distended with normal bowel sounds. Musculoskeletal: passive and active ROM x 4 extremities. Skin: Skin color, texture, turgor normal.    Neurologic:  Neurovascularly intact without any focal sensory/motor deficits.  Cranial nerves: II-XII intact, grossly non-focal.  Psychiatric: Alert and oriented, thought content appropriate  Capillary Refill: Brisk,< 3 seconds   Peripheral Pulses: +2 palpable, equal bilaterally       Labs:   Recent Labs     11/04/22  1631 11/05/22  0526   WBC 14.6* 10.2   HGB 13.7 12.7   HCT 42.8 39.2    245     Recent Labs     11/04/22  1631 11/05/22  0526    133*   K 4.0 4.2    100   CO2 22* 23   BUN 15 20   CREATININE 0.7 0.7   CALCIUM 10.0 9.6     Microbiology:    Strep pneumonia is pending  Legionella is pending  Molecular pneumonia panel positive for Moraxella catarrhalis and staph aureus    Urinalysis:      Lab Results   Component Value Date/Time    NITRU NEGATIVE 07/28/2022 12:01 AM WBCUA  07/28/2022 12:01 AM    WBCUA 0-2 04/19/2012 10:10 AM    BACTERIA NONE SEEN 07/28/2022 12:01 AM    RBCUA 0-2 07/28/2022 12:01 AM    BLOODU TRACE 07/28/2022 12:01 AM    SPECGRAV 1.012 02/20/2021 06:30 PM    GLUCOSEU NEGATIVE 07/28/2022 12:01 AM       Radiology:  XR CHEST PORTABLE    Result Date: 11/4/2022  PROCEDURE: XR CHEST PORTABLE CLINICAL INFORMATION: 79-year-old female with cough and shortness of breath. COMPARISON: Radiographs 9/20/2022. TECHNIQUE: AP upright view of the chest was obtained. FINDINGS: Interstitial opacities are present throughout both lungs. The cardiac silhouette and pulmonary vasculature are within normal limits. There is no significant pleural effusion or pneumothorax. Visualized portions of the upper abdomen are within normal limits. The osseous structures are intact. No acute fractures or suspicious osseous lesions. Prominent interstitial infiltrates are present throughout the lungs on both sides. **This report has been created using voice recognition software. It may contain minor errors which are inherent in voice recognition technology. ** Final report electronically signed by Dr Martha Watson on 11/4/2022 4:34 PM      DVT prophylaxis: [x] Lovenox                                 [] SCDs                                 [] SQ Heparin                                 [] Encourage ambulation           [] Already on Anticoagulation     Code Status: Full Code    PT/OT Eval Status: Ambulate    Tele:   [x] Yes sinus rhythm heart rate 66~stop             [] no    Active Hospital Problems    Diagnosis Date Noted    Acute exacerbation of chronic obstructive pulmonary disease (COPD) ([facility] [J44.1] 11/04/2022     Priority: Medium       Electronically signed by BA Frazier CNP on 11/5/2022 at 6:48 AM

## 2022-11-05 NOTE — RT PROTOCOL NOTE
RT Inhaler-Nebulizer Bronchodilator Protocol Note    There is a bronchodilator order in the chart from a provider indicating to follow the RT Bronchodilator Protocol and there is an Initiate RT Inhaler-Nebulizer Bronchodilator Protocol order as well (see protocol at bottom of note). CXR Findings:  XR CHEST PORTABLE    Result Date: 11/4/2022  Prominent interstitial infiltrates are present throughout the lungs on both sides. **This report has been created using voice recognition software. It may contain minor errors which are inherent in voice recognition technology. ** Final report electronically signed by Dr Sedonia Nyhan on 11/4/2022 4:34 PM      The findings from the last RT Protocol Assessment were as follows:   History Pulmonary Disease: Chronic pulmonary disease  Respiratory Pattern: Mild dyspnea at rest, irregular pattern, or RR 21-25 bpm  Breath Sounds: Slightly diminished and/or crackles  Cough: Strong, spontaneous, non-productive  Indication for Bronchodilator Therapy: Decreased or absent breath sounds  Bronchodilator Assessment Score: 8    Aerosolized bronchodilator medication orders have been revised according to the RT Inhaler-Nebulizer Bronchodilator Protocol below. Respiratory Therapist to perform RT Therapy Protocol Assessment initially then follow the protocol. Repeat RT Therapy Protocol Assessment PRN for score 0-3 or on second treatment, BID, and PRN for scores above 3. No Indications - adjust the frequency to every 6 hours PRN wheezing or bronchospasm, if no treatments needed after 48 hours then discontinue using Per Protocol order mode. If indication present, adjust the RT bronchodilator orders based on the Bronchodilator Assessment Score as indicated below.   Use Inhaler orders unless patient has one or more of the following: on home nebulizer, not able to hold breath for 10 seconds, is not alert and oriented, cannot activate and use MDI correctly, or respiratory rate 25 breaths per minute or more, then use the equivalent nebulizer order(s) with same Frequency and PRN reasons based on the score. If a patient is on this medication at home then do not decrease Frequency below that used at home. 0-3 - enter or revise RT bronchodilator order(s) to equivalent RT Bronchodilator order with Frequency of every 4 hours PRN for wheezing or increased work of breathing using Per Protocol order mode. 4-6 - enter or revise RT Bronchodilator order(s) to two equivalent RT bronchodilator orders with one order with BID Frequency and one order with Frequency of every 4 hours PRN wheezing or increased work of breathing using Per Protocol order mode. 7-10 - enter or revise RT Bronchodilator order(s) to two equivalent RT bronchodilator orders with one order with TID Frequency and one order with Frequency of every 4 hours PRN wheezing or increased work of breathing using Per Protocol order mode. 11-13 - enter or revise RT Bronchodilator order(s) to one equivalent RT bronchodilator order with QID Frequency and an Albuterol order with Frequency of every 4 hours PRN wheezing or increased work of breathing using Per Protocol order mode. Greater than 13 - enter or revise RT Bronchodilator order(s) to one equivalent RT bronchodilator order with every 4 hours Frequency and an Albuterol order with Frequency of every 2 hours PRN wheezing or increased work of breathing using Per Protocol order mode. RT to enter RT Home Evaluation for COPD & MDI Assessment order using Per Protocol order mode.     Electronically signed by Shannan Cooley RCP on 11/4/2022 at 9:39 PM

## 2022-11-05 NOTE — PLAN OF CARE
Problem: Discharge Planning  Goal: Discharge to home or other facility with appropriate resources  Outcome: Progressing  Flowsheets (Taken 11/5/2022 1210)  Discharge to home or other facility with appropriate resources: Identify barriers to discharge with patient and caregiver  Note: Patient expected to be discharged to home. Problem: Pain  Goal: Verbalizes/displays adequate comfort level or baseline comfort level  Outcome: Progressing  Flowsheets (Taken 11/5/2022 1210)  Verbalizes/displays adequate comfort level or baseline comfort level:   Encourage patient to monitor pain and request assistance   Assess pain using appropriate pain scale  Note: Patient educated on pain rating scale. Denies pain at this time and states pain goal is to have no pain. Problem: ABCDS Injury Assessment  Goal: Absence of physical injury  Outcome: Progressing  Flowsheets (Taken 11/5/2022 1210)  Absence of Physical Injury: Implement safety measures based on patient assessment  Note: Free from injuries at this time. Falling star program in place. Fall education provided. Call light with patient. Bed/chair alarms on. Bed/wheels locked. Patient verbalizes education provided. Problem: Respiratory - Adult  Goal: Clear lung sounds  Description: Clear lung sounds  11/5/2022 1210 by Kehinde Sim RN  Outcome: Progressing  Note: Crackles noted at lower bases and expiratory wheezing. Dyspnea noted at rest. 2 L O2 via nasal cannula on.       Problem: Respiratory - Adult  Goal: Levels of oxygenation will improve  Description: Levels of oxygenation will improve  Outcome: Progressing     Problem: Chronic Conditions and Co-morbidities  Goal: Patient's chronic conditions and co-morbidity symptoms are monitored and maintained or improved  Outcome: Progressing  Flowsheets (Taken 11/5/2022 1210)  Care Plan - Patient's Chronic Conditions and Co-Morbidity Symptoms are Monitored and Maintained or Improved: Monitor and assess patient's chronic conditions and comorbid symptoms for stability, deterioration, or improvement  Note: Monitoring patient's chronic conditions. New or worsening s/s will be reported to provider. Care plan reviewed with patient . Patient verbalize understanding of the plan of care and contribute to goal setting.

## 2022-11-05 NOTE — PROGRESS NOTES
11/05/22 1416   Encounter Summary   Encounter Overview/Reason  Initial Encounter   Service Provided For: Patient   Referral/Consult From: [address] Family members   Last Encounter  11/05/22   Complexity of Encounter Low   Begin Time 1352   End Time  1400   Total Time Calculated 8 min   Assessment/Intervention/Outcome   Assessment Calm;Coping   Intervention Active listening;Prayer (assurance of)/[place]   Outcome Acceptance    provided spiritual care encounter at bedside with patient. Patient hopeful she is getting better. Prayer offered with patient for healing and comfort. Spiritual care will continue to be available as needed for spiritual and emotional needs.

## 2022-11-05 NOTE — PROGRESS NOTES
[address] Pharmacokinetic Monitoring Service - Vancomycin     Kevin Alcantara is a 74 y.o. female starting on vancomycin therapy for CAP. Pharmacy consulted for monitoring and adjustment. Target Concentration: Goal trough of 10-15 mg/L and AUC/FELICIA <500 mg*hr/L    Additional Antimicrobials: Zithromax    Pertinent Laboratory Values: Wt Readings from Last 1 Encounters:   11/04/22 164 lb (74.4 kg)     Temp Readings from Last 1 Encounters:   11/04/22 97.8 °F (36.6 °C) (Oral)     Estimated Creatinine Clearance: 79 mL/min (based on SCr of 0.7 mg/dL).   Recent Labs     11/04/22  1631   CREATININE 0.7   WBC 14.6*     Procalcitonin: 0.11    Pertinent Cultures:  Culture Date Source Results   11/04/22 [facility]    MRSA Nasal Swab: pneumonia panel ordered    Plan:  Dosing recommendations based on Bayesian software  Start vancomycin 1250 mg iv every 18 hours  Anticipated AUC of 431 and trough concentration of 12.1 at steady state  Renal labs as indicated   Pharmacy will continue to monitor patient and adjust therapy as indicated    Thank you for the consult,  Matthieu Vazquez [facility]  11/5/2022 1:11 AM

## 2022-11-06 LAB
GLUCOSE BLD-MCNC: 197 MG/DL (ref 70–108)
GLUCOSE BLD-MCNC: 234 MG/DL (ref 70–108)
GLUCOSE BLD-MCNC: 327 MG/DL (ref 70–108)
GLUCOSE BLD-MCNC: 430 MG/DL (ref 70–108)

## 2022-11-06 PROCEDURE — 82948 REAGENT STRIP/BLOOD GLUCOSE: CPT

## 2022-11-06 PROCEDURE — 94640 AIRWAY INHALATION TREATMENT: CPT

## 2022-11-06 PROCEDURE — 6370000000 HC RX 637 (ALT 250 FOR IP)

## 2022-11-06 PROCEDURE — 99232 SBSQ HOSP IP/OBS MODERATE 35: CPT | Performed by: NURSE PRACTITIONER

## 2022-11-06 PROCEDURE — 6360000002 HC RX W HCPCS

## 2022-11-06 PROCEDURE — 6360000002 HC RX W HCPCS: Performed by: NURSE PRACTITIONER

## 2022-11-06 PROCEDURE — 2580000003 HC RX 258: Performed by: NURSE PRACTITIONER

## 2022-11-06 PROCEDURE — 6370000000 HC RX 637 (ALT 250 FOR IP): Performed by: NURSE PRACTITIONER

## 2022-11-06 PROCEDURE — 2580000003 HC RX 258

## 2022-11-06 PROCEDURE — 1200000003 HC TELEMETRY R&B

## 2022-11-06 PROCEDURE — 94760 N-INVAS EAR/PLS OXIMETRY 1: CPT

## 2022-11-06 PROCEDURE — 94669 MECHANICAL CHEST WALL OSCILL: CPT

## 2022-11-06 RX ORDER — INSULIN LISPRO 100 [IU]/ML
0-16 INJECTION, SOLUTION INTRAVENOUS; SUBCUTANEOUS
Status: DISCONTINUED | OUTPATIENT
Start: 2022-11-06 | End: 2022-11-08 | Stop reason: HOSPADM

## 2022-11-06 RX ORDER — INSULIN LISPRO 100 [IU]/ML
0-4 INJECTION, SOLUTION INTRAVENOUS; SUBCUTANEOUS NIGHTLY
Status: DISCONTINUED | OUTPATIENT
Start: 2022-11-06 | End: 2022-11-08 | Stop reason: HOSPADM

## 2022-11-06 RX ORDER — KETOROLAC TROMETHAMINE 30 MG/ML
30 INJECTION, SOLUTION INTRAMUSCULAR; INTRAVENOUS EVERY 6 HOURS PRN
Status: DISCONTINUED | OUTPATIENT
Start: 2022-11-06 | End: 2022-11-08 | Stop reason: HOSPADM

## 2022-11-06 RX ADMIN — FENOFIBRATE 160 MG: 160 TABLET ORAL at 09:28

## 2022-11-06 RX ADMIN — SUCRALFATE 1 G: 1 TABLET ORAL at 09:28

## 2022-11-06 RX ADMIN — FOLIC ACID 1 MG: 1 TABLET ORAL at 09:28

## 2022-11-06 RX ADMIN — TRAZODONE HYDROCHLORIDE 150 MG: 100 TABLET ORAL at 23:19

## 2022-11-06 RX ADMIN — PANTOPRAZOLE SODIUM 40 MG: 40 TABLET, DELAYED RELEASE ORAL at 06:06

## 2022-11-06 RX ADMIN — ARIPIPRAZOLE 2 MG: 2 TABLET ORAL at 09:30

## 2022-11-06 RX ADMIN — SODIUM CHLORIDE, PRESERVATIVE FREE 10 ML: 5 INJECTION INTRAVENOUS at 22:12

## 2022-11-06 RX ADMIN — CARVEDILOL 3.12 MG: 3.12 TABLET, FILM COATED ORAL at 22:11

## 2022-11-06 RX ADMIN — ASPIRIN 81 MG: 81 TABLET, COATED ORAL at 09:33

## 2022-11-06 RX ADMIN — ESCITALOPRAM OXALATE 30 MG: 20 TABLET ORAL at 09:28

## 2022-11-06 RX ADMIN — SODIUM CHLORIDE, PRESERVATIVE FREE 10 ML: 5 INJECTION INTRAVENOUS at 13:41

## 2022-11-06 RX ADMIN — INSULIN LISPRO 16 UNITS: 100 INJECTION, SOLUTION INTRAVENOUS; SUBCUTANEOUS at 22:28

## 2022-11-06 RX ADMIN — LEVOTHYROXINE SODIUM 75 MCG: 0.07 TABLET ORAL at 06:08

## 2022-11-06 RX ADMIN — CEFTRIAXONE SODIUM 1000 MG: 1 INJECTION, POWDER, FOR SOLUTION INTRAMUSCULAR; INTRAVENOUS at 13:40

## 2022-11-06 RX ADMIN — ROFLUMILAST 500 MCG: 500 TABLET ORAL at 09:30

## 2022-11-06 RX ADMIN — FAMOTIDINE 20 MG: 20 TABLET ORAL at 09:33

## 2022-11-06 RX ADMIN — ALBUTEROL SULFATE 2.5 MG: 2.5 SOLUTION RESPIRATORY (INHALATION) at 21:49

## 2022-11-06 RX ADMIN — TIOTROPIUM BROMIDE INHALATION SPRAY 2 PUFF: 3.12 SPRAY, METERED RESPIRATORY (INHALATION) at 05:35

## 2022-11-06 RX ADMIN — ENOXAPARIN SODIUM 40 MG: 100 INJECTION SUBCUTANEOUS at 09:33

## 2022-11-06 RX ADMIN — ALBUTEROL SULFATE 2.5 MG: 2.5 SOLUTION RESPIRATORY (INHALATION) at 05:35

## 2022-11-06 RX ADMIN — PREDNISONE 40 MG: 20 TABLET ORAL at 09:27

## 2022-11-06 RX ADMIN — KETOROLAC TROMETHAMINE 30 MG: 30 INJECTION, SOLUTION INTRAMUSCULAR; INTRAVENOUS at 09:33

## 2022-11-06 RX ADMIN — SUCRALFATE 1 G: 1 TABLET ORAL at 18:22

## 2022-11-06 RX ADMIN — FERROUS SULFATE TAB 325 MG (65 MG ELEMENTAL FE) 325 MG: 325 (65 FE) TAB at 22:12

## 2022-11-06 RX ADMIN — GUAIFENESIN 600 MG: 600 TABLET, EXTENDED RELEASE ORAL at 22:12

## 2022-11-06 RX ADMIN — INSULIN LISPRO 4 UNITS: 100 INJECTION, SOLUTION INTRAVENOUS; SUBCUTANEOUS at 13:34

## 2022-11-06 RX ADMIN — CLOPIDOGREL BISULFATE 75 MG: 75 TABLET ORAL at 09:33

## 2022-11-06 RX ADMIN — MOMETASONE FUROATE AND FORMOTEROL FUMARATE DIHYDRATE 2 PUFF: 200; 5 AEROSOL RESPIRATORY (INHALATION) at 21:49

## 2022-11-06 RX ADMIN — SUCRALFATE 1 G: 1 TABLET ORAL at 22:12

## 2022-11-06 RX ADMIN — SUCRALFATE 1 G: 1 TABLET ORAL at 14:29

## 2022-11-06 RX ADMIN — QUETIAPINE FUMARATE 300 MG: 100 TABLET ORAL at 22:10

## 2022-11-06 RX ADMIN — ATORVASTATIN CALCIUM 40 MG: 40 TABLET, FILM COATED ORAL at 22:12

## 2022-11-06 RX ADMIN — AZITHROMYCIN MONOHYDRATE 250 MG: 250 TABLET ORAL at 09:28

## 2022-11-06 RX ADMIN — GUAIFENESIN 600 MG: 600 TABLET, EXTENDED RELEASE ORAL at 09:28

## 2022-11-06 RX ADMIN — KETOROLAC TROMETHAMINE 30 MG: 30 INJECTION, SOLUTION INTRAMUSCULAR; INTRAVENOUS at 18:17

## 2022-11-06 RX ADMIN — INSULIN LISPRO 12 UNITS: 100 INJECTION, SOLUTION INTRAVENOUS; SUBCUTANEOUS at 18:20

## 2022-11-06 RX ADMIN — FERROUS SULFATE TAB 325 MG (65 MG ELEMENTAL FE) 325 MG: 325 (65 FE) TAB at 09:30

## 2022-11-06 ASSESSMENT — PAIN DESCRIPTION - LOCATION
LOCATION: CHEST
LOCATION: CHEST

## 2022-11-06 ASSESSMENT — PAIN DESCRIPTION - DESCRIPTORS
DESCRIPTORS: ACHING
DESCRIPTORS: ACHING

## 2022-11-06 ASSESSMENT — PAIN DESCRIPTION - PAIN TYPE: TYPE: ACUTE PAIN

## 2022-11-06 ASSESSMENT — PAIN DESCRIPTION - FREQUENCY: FREQUENCY: INTERMITTENT

## 2022-11-06 ASSESSMENT — PAIN SCALES - GENERAL
PAINLEVEL_OUTOF10: 2
PAINLEVEL_OUTOF10: 7
PAINLEVEL_OUTOF10: 0
PAINLEVEL_OUTOF10: 8

## 2022-11-06 ASSESSMENT — PAIN DESCRIPTION - ORIENTATION
ORIENTATION: LOWER
ORIENTATION: LOWER

## 2022-11-06 ASSESSMENT — PAIN DESCRIPTION - ONSET: ONSET: ON-GOING

## 2022-11-06 NOTE — PLAN OF CARE
Problem: Discharge Planning  Goal: Discharge to home or other facility with appropriate resources  11/6/2022 0119 by Daryle Berber, RN  Outcome: Progressing  11/5/2022 1210 by Michelle Norton RN  Outcome: Progressing  Flowsheets (Taken 11/5/2022 1210)  Discharge to home or other facility with appropriate resources: Identify barriers to discharge with patient and caregiver  Note: Patient expected to be discharged to home. Problem: Pain  Goal: Verbalizes/displays adequate comfort level or baseline comfort level  11/6/2022 0119 by Daryle Berber, RN  Outcome: Progressing  11/5/2022 1210 by Michelle Norton RN  Outcome: Progressing  Flowsheets (Taken 11/5/2022 1210)  Verbalizes/displays adequate comfort level or baseline comfort level:   Encourage patient to monitor pain and request assistance   Assess pain using appropriate pain scale  Note: Patient educated on pain rating scale. Denies pain at this time and states pain goal is to have no pain. Problem: ABCDS Injury Assessment  Goal: Absence of physical injury  11/6/2022 0119 by Daryle Berber, RN  Outcome: Progressing  11/5/2022 1210 by Michelle Norton RN  Outcome: Progressing  Flowsheets (Taken 11/5/2022 1210)  Absence of Physical Injury: Implement safety measures based on patient assessment  Note: Free from injuries at this time. Falling star program in place. Fall education provided. Call light with patient. Bed/chair alarms on. Bed/wheels locked. Patient verbalizes education provided. Problem: Respiratory - Adult  Goal: Clear lung sounds  Description: Clear lung sounds  11/6/2022 0119 by Daryle Berber, RN  Outcome: Progressing  11/5/2022 2055 by David Padilla RCP  Outcome: Progressing  11/5/2022 1210 by Michelle Norton RN  Outcome: Progressing  Note: Crackles noted at lower bases and expiratory wheezing. Dyspnea noted at rest. 2 L O2 via nasal cannula on.    Goal: Levels of oxygenation will improve  Description: Levels of oxygenation will improve  11/6/2022 0119 by Colleen To RN  Outcome: Progressing  11/5/2022 1210 by Luis Cameron RN  Outcome: Progressing     Problem: Chronic Conditions and Co-morbidities  Goal: Patient's chronic conditions and co-morbidity symptoms are monitored and maintained or improved  11/6/2022 0119 by Colleen To RN  Outcome: Progressing  11/5/2022 1210 by Luis Cameron RN  Outcome: Progressing  Flowsheets (Taken 11/5/2022 1210)  Care Plan - Patient's Chronic Conditions and Co-Morbidity Symptoms are Monitored and Maintained or Improved: Monitor and assess patient's chronic conditions and comorbid symptoms for stability, deterioration, or improvement  Note: Monitoring patient's chronic conditions. New or worsening s/s will be reported to provider. Care plan reviewed with patient. Patient verbalize understanding of the plan of care and contribute to goal setting.

## 2022-11-06 NOTE — RT PROTOCOL NOTE
RT Inhaler-Nebulizer Bronchodilator Protocol Note    There is a bronchodilator order in the chart from a provider indicating to follow the RT Bronchodilator Protocol and there is an Initiate RT Inhaler-Nebulizer Bronchodilator Protocol order as well (see protocol at bottom of note). CXR Findings:  XR CHEST PORTABLE    Result Date: 11/4/2022  Prominent interstitial infiltrates are present throughout the lungs on both sides. **This report has been created using voice recognition software. It may contain minor errors which are inherent in voice recognition technology. ** Final report electronically signed by Dr Tiffany Neal on 11/4/2022 4:34 PM      The findings from the last RT Protocol Assessment were as follows:   History Pulmonary Disease: Chronic pulmonary disease  Respiratory Pattern: Dyspnea on exertion or RR 21-25 bpm  Breath Sounds: Slightly diminished and/or crackles  Cough: Strong, productive  Indication for Bronchodilator Therapy: Decreased or absent breath sounds, On home bronchodilators  Bronchodilator Assessment Score: 7    Pt scores for TID, but pt only wants them BID like she usually takes at home. Aerosolized bronchodilator medication orders have been revised according to the RT Inhaler-Nebulizer Bronchodilator Protocol below. Respiratory Therapist to perform RT Therapy Protocol Assessment initially then follow the protocol. Repeat RT Therapy Protocol Assessment PRN for score 0-3 or on second treatment, BID, and PRN for scores above 3. No Indications - adjust the frequency to every 6 hours PRN wheezing or bronchospasm, if no treatments needed after 48 hours then discontinue using Per Protocol order mode. If indication present, adjust the RT bronchodilator orders based on the Bronchodilator Assessment Score as indicated below.   Use Inhaler orders unless patient has one or more of the following: on home nebulizer, not able to hold breath for 10 seconds, is not alert and oriented, cannot activate and use MDI correctly, or respiratory rate 25 breaths per minute or more, then use the equivalent nebulizer order(s) with same Frequency and PRN reasons based on the score. If a patient is on this medication at home then do not decrease Frequency below that used at home. 0-3 - enter or revise RT bronchodilator order(s) to equivalent RT Bronchodilator order with Frequency of every 4 hours PRN for wheezing or increased work of breathing using Per Protocol order mode. 4-6 - enter or revise RT Bronchodilator order(s) to two equivalent RT bronchodilator orders with one order with BID Frequency and one order with Frequency of every 4 hours PRN wheezing or increased work of breathing using Per Protocol order mode. 7-10 - enter or revise RT Bronchodilator order(s) to two equivalent RT bronchodilator orders with one order with TID Frequency and one order with Frequency of every 4 hours PRN wheezing or increased work of breathing using Per Protocol order mode. 11-13 - enter or revise RT Bronchodilator order(s) to one equivalent RT bronchodilator order with QID Frequency and an Albuterol order with Frequency of every 4 hours PRN wheezing or increased work of breathing using Per Protocol order mode. Greater than 13 - enter or revise RT Bronchodilator order(s) to one equivalent RT bronchodilator order with every 4 hours Frequency and an Albuterol order with Frequency of every 2 hours PRN wheezing or increased work of breathing using Per Protocol order mode. RT to enter RT Home Evaluation for COPD & MDI Assessment order using Per Protocol order mode.     Electronically signed by Ap Ochoa RCP on 11/6/2022 at 5:53 AM

## 2022-11-06 NOTE — PLAN OF CARE
Problem: Respiratory - Adult  Goal: Clear lung sounds  Description: Clear lung sounds  11/5/2022 2055 by Philipp Anaya RCP  Outcome: Progressing   Continue inhaled medication to improve aeration. Patient mutually agrees on goals.

## 2022-11-06 NOTE — RT PROTOCOL NOTE
RT Inhaler-Nebulizer Bronchodilator Protocol Note    There is a bronchodilator order in the chart from a provider indicating to follow the RT Bronchodilator Protocol and there is an Initiate RT Inhaler-Nebulizer Bronchodilator Protocol order as well (see protocol at bottom of note). CXR Findings:  XR CHEST PORTABLE    Result Date: 11/4/2022  Prominent interstitial infiltrates are present throughout the lungs on both sides. **This report has been created using voice recognition software. It may contain minor errors which are inherent in voice recognition technology. ** Final report electronically signed by Dr Donna Barber on 11/4/2022 4:34 PM      The findings from the last RT Protocol Assessment were as follows:   History Pulmonary Disease: Chronic pulmonary disease  Respiratory Pattern: Dyspnea on exertion or RR 21-25 bpm  Breath Sounds: Slightly diminished and/or crackles  Cough: Strong, productive  Indication for Bronchodilator Therapy: Decreased or absent breath sounds, On home bronchodilators (takes prn at home patient said uses once maybe twice a day treatments)  Bronchodilator Assessment Score: 7    Aerosolized bronchodilator medication orders have been revised according to the RT Inhaler-Nebulizer Bronchodilator Protocol below. Respiratory Therapist to perform RT Therapy Protocol Assessment initially then follow the protocol. Repeat RT Therapy Protocol Assessment PRN for score 0-3 or on second treatment, BID, and PRN for scores above 3. No Indications - adjust the frequency to every 6 hours PRN wheezing or bronchospasm, if no treatments needed after 48 hours then discontinue using Per Protocol order mode. If indication present, adjust the RT bronchodilator orders based on the Bronchodilator Assessment Score as indicated below.   Use Inhaler orders unless patient has one or more of the following: on home nebulizer, not able to hold breath for 10 seconds, is not alert and oriented, cannot activate and use MDI correctly, or respiratory rate 25 breaths per minute or more, then use the equivalent nebulizer order(s) with same Frequency and PRN reasons based on the score. If a patient is on this medication at home then do not decrease Frequency below that used at home. 0-3 - enter or revise RT bronchodilator order(s) to equivalent RT Bronchodilator order with Frequency of every 4 hours PRN for wheezing or increased work of breathing using Per Protocol order mode. 4-6 - enter or revise RT Bronchodilator order(s) to two equivalent RT bronchodilator orders with one order with BID Frequency and one order with Frequency of every 4 hours PRN wheezing or increased work of breathing using Per Protocol order mode. 7-10 - enter or revise RT Bronchodilator order(s) to two equivalent RT bronchodilator orders with one order with TID Frequency and one order with Frequency of every 4 hours PRN wheezing or increased work of breathing using Per Protocol order mode. 11-13 - enter or revise RT Bronchodilator order(s) to one equivalent RT bronchodilator order with QID Frequency and an Albuterol order with Frequency of every 4 hours PRN wheezing or increased work of breathing using Per Protocol order mode. Greater than 13 - enter or revise RT Bronchodilator order(s) to one equivalent RT bronchodilator order with every 4 hours Frequency and an Albuterol order with Frequency of every 2 hours PRN wheezing or increased work of breathing using Per Protocol order mode. RT to enter RT Home Evaluation for COPD & MDI Assessment order using Per Protocol order mode.     Electronically signed by Lorna Kehr, RCP on 11/5/2022 at 8:53 PM

## 2022-11-06 NOTE — PLAN OF CARE
Problem: Respiratory - Adult  Goal: Clear lung sounds  Description: Clear lung sounds  11/6/2022 0559 by Parmjit Blackburn RCP  Outcome: Progressing   Continue tx's to improve breath sounds, increase aeration and decrease WOB. Pt mutually agrees with goals.

## 2022-11-07 ENCOUNTER — APPOINTMENT (OUTPATIENT)
Dept: GENERAL RADIOLOGY | Age: 67
DRG: 177 | End: 2022-11-07
Payer: MEDICARE

## 2022-11-07 LAB
GLUCOSE BLD-MCNC: 122 MG/DL (ref 70–108)
GLUCOSE BLD-MCNC: 288 MG/DL (ref 70–108)
GLUCOSE BLD-MCNC: 336 MG/DL (ref 70–108)
GLUCOSE BLD-MCNC: 408 MG/DL (ref 70–108)
PRO-BNP: 1157 PG/ML (ref 0–900)

## 2022-11-07 PROCEDURE — 6370000000 HC RX 637 (ALT 250 FOR IP)

## 2022-11-07 PROCEDURE — 94640 AIRWAY INHALATION TREATMENT: CPT

## 2022-11-07 PROCEDURE — 71046 X-RAY EXAM CHEST 2 VIEWS: CPT

## 2022-11-07 PROCEDURE — 83880 ASSAY OF NATRIURETIC PEPTIDE: CPT

## 2022-11-07 PROCEDURE — 6360000002 HC RX W HCPCS: Performed by: NURSE PRACTITIONER

## 2022-11-07 PROCEDURE — 6370000000 HC RX 637 (ALT 250 FOR IP): Performed by: NURSE PRACTITIONER

## 2022-11-07 PROCEDURE — 1200000003 HC TELEMETRY R&B

## 2022-11-07 PROCEDURE — 99233 SBSQ HOSP IP/OBS HIGH 50: CPT | Performed by: NURSE PRACTITIONER

## 2022-11-07 PROCEDURE — 82948 REAGENT STRIP/BLOOD GLUCOSE: CPT

## 2022-11-07 PROCEDURE — 2580000003 HC RX 258: Performed by: NURSE PRACTITIONER

## 2022-11-07 PROCEDURE — 6360000002 HC RX W HCPCS

## 2022-11-07 PROCEDURE — 36415 COLL VENOUS BLD VENIPUNCTURE: CPT

## 2022-11-07 PROCEDURE — 2580000003 HC RX 258

## 2022-11-07 RX ORDER — FUROSEMIDE 10 MG/ML
20 INJECTION INTRAMUSCULAR; INTRAVENOUS ONCE
Status: COMPLETED | OUTPATIENT
Start: 2022-11-07 | End: 2022-11-07

## 2022-11-07 RX ADMIN — AZITHROMYCIN MONOHYDRATE 250 MG: 250 TABLET ORAL at 08:33

## 2022-11-07 RX ADMIN — ROFLUMILAST 500 MCG: 500 TABLET ORAL at 08:39

## 2022-11-07 RX ADMIN — TIOTROPIUM BROMIDE INHALATION SPRAY 2 PUFF: 3.12 SPRAY, METERED RESPIRATORY (INHALATION) at 07:38

## 2022-11-07 RX ADMIN — MOMETASONE FUROATE AND FORMOTEROL FUMARATE DIHYDRATE 2 PUFF: 200; 5 AEROSOL RESPIRATORY (INHALATION) at 16:51

## 2022-11-07 RX ADMIN — INSULIN LISPRO 12 UNITS: 100 INJECTION, SOLUTION INTRAVENOUS; SUBCUTANEOUS at 17:39

## 2022-11-07 RX ADMIN — TRAZODONE HYDROCHLORIDE 150 MG: 100 TABLET ORAL at 21:17

## 2022-11-07 RX ADMIN — SUCRALFATE 1 G: 1 TABLET ORAL at 12:59

## 2022-11-07 RX ADMIN — INSULIN LISPRO 4 UNITS: 100 INJECTION, SOLUTION INTRAVENOUS; SUBCUTANEOUS at 21:22

## 2022-11-07 RX ADMIN — QUETIAPINE FUMARATE 300 MG: 100 TABLET ORAL at 22:36

## 2022-11-07 RX ADMIN — ARIPIPRAZOLE 2 MG: 2 TABLET ORAL at 08:33

## 2022-11-07 RX ADMIN — CLOPIDOGREL BISULFATE 75 MG: 75 TABLET ORAL at 08:38

## 2022-11-07 RX ADMIN — SODIUM CHLORIDE, PRESERVATIVE FREE 10 ML: 5 INJECTION INTRAVENOUS at 21:28

## 2022-11-07 RX ADMIN — FENOFIBRATE 160 MG: 160 TABLET ORAL at 08:38

## 2022-11-07 RX ADMIN — ASPIRIN 81 MG: 81 TABLET, COATED ORAL at 08:33

## 2022-11-07 RX ADMIN — FUROSEMIDE 20 MG: 10 INJECTION, SOLUTION INTRAMUSCULAR; INTRAVENOUS at 14:03

## 2022-11-07 RX ADMIN — PREDNISONE 40 MG: 20 TABLET ORAL at 08:34

## 2022-11-07 RX ADMIN — MOMETASONE FUROATE AND FORMOTEROL FUMARATE DIHYDRATE 2 PUFF: 200; 5 AEROSOL RESPIRATORY (INHALATION) at 07:37

## 2022-11-07 RX ADMIN — SUCRALFATE 1 G: 1 TABLET ORAL at 08:39

## 2022-11-07 RX ADMIN — PANTOPRAZOLE SODIUM 40 MG: 40 TABLET, DELAYED RELEASE ORAL at 06:15

## 2022-11-07 RX ADMIN — KETOROLAC TROMETHAMINE 30 MG: 30 INJECTION, SOLUTION INTRAMUSCULAR; INTRAVENOUS at 21:17

## 2022-11-07 RX ADMIN — ALBUTEROL SULFATE 2.5 MG: 2.5 SOLUTION RESPIRATORY (INHALATION) at 16:52

## 2022-11-07 RX ADMIN — ENOXAPARIN SODIUM 40 MG: 100 INJECTION SUBCUTANEOUS at 08:38

## 2022-11-07 RX ADMIN — ATORVASTATIN CALCIUM 40 MG: 40 TABLET, FILM COATED ORAL at 22:37

## 2022-11-07 RX ADMIN — CARVEDILOL 3.12 MG: 3.12 TABLET, FILM COATED ORAL at 08:34

## 2022-11-07 RX ADMIN — CARVEDILOL 3.12 MG: 3.12 TABLET, FILM COATED ORAL at 22:37

## 2022-11-07 RX ADMIN — LEVOTHYROXINE SODIUM 75 MCG: 0.07 TABLET ORAL at 06:15

## 2022-11-07 RX ADMIN — FAMOTIDINE 20 MG: 20 TABLET ORAL at 08:34

## 2022-11-07 RX ADMIN — FOLIC ACID 1 MG: 1 TABLET ORAL at 08:39

## 2022-11-07 RX ADMIN — ESCITALOPRAM OXALATE 30 MG: 20 TABLET ORAL at 08:33

## 2022-11-07 RX ADMIN — ALBUTEROL SULFATE 2.5 MG: 2.5 SOLUTION RESPIRATORY (INHALATION) at 07:35

## 2022-11-07 RX ADMIN — GUAIFENESIN 600 MG: 600 TABLET, EXTENDED RELEASE ORAL at 08:38

## 2022-11-07 RX ADMIN — GUAIFENESIN 600 MG: 600 TABLET, EXTENDED RELEASE ORAL at 22:37

## 2022-11-07 RX ADMIN — INSULIN LISPRO 8 UNITS: 100 INJECTION, SOLUTION INTRAVENOUS; SUBCUTANEOUS at 12:12

## 2022-11-07 RX ADMIN — CEFTRIAXONE SODIUM 1000 MG: 1 INJECTION, POWDER, FOR SOLUTION INTRAMUSCULAR; INTRAVENOUS at 14:02

## 2022-11-07 RX ADMIN — KETOROLAC TROMETHAMINE 30 MG: 30 INJECTION, SOLUTION INTRAMUSCULAR; INTRAVENOUS at 13:02

## 2022-11-07 RX ADMIN — FERROUS SULFATE TAB 325 MG (65 MG ELEMENTAL FE) 325 MG: 325 (65 FE) TAB at 22:37

## 2022-11-07 RX ADMIN — SUCRALFATE 1 G: 1 TABLET ORAL at 23:41

## 2022-11-07 RX ADMIN — FERROUS SULFATE TAB 325 MG (65 MG ELEMENTAL FE) 325 MG: 325 (65 FE) TAB at 08:39

## 2022-11-07 ASSESSMENT — PAIN DESCRIPTION - DESCRIPTORS
DESCRIPTORS: ACHING

## 2022-11-07 ASSESSMENT — PAIN DESCRIPTION - ORIENTATION
ORIENTATION: LEFT;LOWER
ORIENTATION: LEFT;LOWER

## 2022-11-07 ASSESSMENT — PAIN DESCRIPTION - FREQUENCY
FREQUENCY: CONTINUOUS
FREQUENCY: CONTINUOUS

## 2022-11-07 ASSESSMENT — PAIN SCALES - GENERAL
PAINLEVEL_OUTOF10: 6
PAINLEVEL_OUTOF10: 0
PAINLEVEL_OUTOF10: 8

## 2022-11-07 ASSESSMENT — PAIN DESCRIPTION - LOCATION
LOCATION: BACK
LOCATION: BACK;CHEST
LOCATION: BACK

## 2022-11-07 ASSESSMENT — PAIN DESCRIPTION - ONSET
ONSET: ON-GOING
ONSET: ON-GOING

## 2022-11-07 ASSESSMENT — PAIN - FUNCTIONAL ASSESSMENT
PAIN_FUNCTIONAL_ASSESSMENT: PREVENTS OR INTERFERES SOME ACTIVE ACTIVITIES AND ADLS
PAIN_FUNCTIONAL_ASSESSMENT: PREVENTS OR INTERFERES SOME ACTIVE ACTIVITIES AND ADLS

## 2022-11-07 ASSESSMENT — PAIN DESCRIPTION - PAIN TYPE
TYPE: CHRONIC PAIN
TYPE: CHRONIC PAIN

## 2022-11-07 NOTE — PROGRESS NOTES
Increased              Hospitalist Progress Note    Patient:  Lei Lilly      Unit/Bed:8A-06/006-A    YOB: 1955    MRN: [id number]       Acct: [de-identified]     PCP: Glenda Burdick, BA - CNP    Date of Admission: 11/4/2022    Assessment/Plan:    Acute COPD exacerbation--on Proventil nebulizers twice a day, Spiriva; received Solu-Medrol 125 mg on 11/4 and started on prednisone 40 mg daily from 11/5; oxygen weaned off, patient states she quit smoking 4 days ago  Possible bilateral pneumonia (POA), likely secondary to gram-negative bacilli--Zithromax 11/4, Rocephin from 11/5; has incentive spirometry and Acapella  Elevated troponin--trended down to normal, likely demand mismatch  Acute hyperglycemia--likely secondary to steroids, hemoglobin A1c noted to be 6.5, on high dose Humalog sliding scale along with hypoglycemia protocol, glucose down to 122 today, will need outpatient follow-up, monitor  Leukocytosis--resulted to normal  Essential hypertension, uncontrolled--Coreg, monitor  Chronic diastolic heart failure--on Coreg  Hypothyroidism--treated with Synthroid  Tobacco abuse--cessation counseling; NicoDerm 14 mg topically daily  History of urinary overflow incontinence      Expected discharge date: Approximately 1 days    Disposition:    [x] Home       [] TCU       [] Rehab       [] Psych       [] SNF       [] [place]       [] Other-    Chief Complaint: Shortness of breath    Hospital Course: Per H&P done 11/4/2022: Domi Oneal is a 73 y/o  female with a PMHx of COPD, urinary incontinence who presents to [facility] ED today for the evaluation of shortness of breath. Pt states her symptoms started about 4 days ago, and has progressively worsened. She endorses associated body aches, headache, fatigue, lightheadedness, decreased appetite and PO intake x 24 hours, and productive cough.  She states she did not take any of her medications yesterday, and has not taken anything to treat her symptoms. Pt  denies fever, chills, chest pain, abdominal pain, dizziness, changes in bowel or urinary habits. \"    11/5--> hemodynamically stable, satting 95% on 3 L of oxygen; sugar was quite high so hemoglobin A1c was checked and noted to be 6.5    11/6--> hemodynamically stable, on room air, much improved aeration today on exam; glucose better on high-dose sliding scale    11/7--> hemodynamically stable, on room air; on exam today lungs with rhonchi so check chest x-ray     Subjective (past 24 hours): States she is tired and that her chest hurts with deep inspiration from coughing, she is eating, she states she feels bloated today    Medications:  Reviewed    Infusion Medications    dextrose      sodium chloride       Scheduled Medications    insulin lispro  0-16 Units SubCUTAneous TID WC    insulin lispro  0-4 Units SubCUTAneous Nightly    azithromycin  250 mg Oral Daily    albuterol  2.5 mg Nebulization BID    cefTRIAXone (ROCEPHIN) IV  1,000 mg IntraVENous Q24H    ARIPiprazole  2 mg Oral Daily    aspirin  81 mg Oral Daily    atorvastatin  40 mg Oral Nightly    carvedilol  3.125 mg Oral BID    clopidogrel  75 mg Oral Daily    escitalopram  30 mg Oral Daily    famotidine  20 mg Oral Daily    fenofibrate  160 mg Oral Daily    ferrous sulfate  325 mg Oral BID    folic acid  1 mg Oral Daily    levothyroxine  75 mcg Oral QAM AC    pantoprazole  40 mg Oral QAM AC    QUEtiapine  300 mg Oral Nightly    Roflumilast  500 mcg Oral Daily    sucralfate  1 g Oral 4x Daily    traZODone  150 mg Oral Nightly    sodium chloride flush  10 mL IntraVENous 2 times per day    enoxaparin  40 mg SubCUTAneous Q24H    nicotine  1 patch TransDERmal Daily    guaiFENesin  600 mg Oral BID    predniSONE  40 mg Oral Daily    mometasone-formoterol  2 puff Inhalation BID    tiotropium  2 puff Inhalation Daily     PRN Meds: ketorolac, glucose, dextrose bolus **OR** dextrose bolus, glucagon (rDNA), dextrose, ALPRAZolam, phenazopyridine, sodium chloride flush, sodium chloride, ondansetron **OR** ondansetron, polyethylene glycol, acetaminophen **OR** acetaminophen, potassium chloride **OR** potassium alternative oral replacement **OR** potassium chloride, magnesium sulfate, albuterol sulfate HFA      Intake/Output Summary (Last 24 hours) at 11/7/2022 0656  Last data filed at 11/6/2022 1341  Gross per 24 hour   Intake 130 ml   Output --   Net 130 ml         Diet:  ADULT DIET; Regular    Exam:  /71   Pulse 55   Temp 97.5 °F (36.4 °C) (Oral)   Resp 18   Ht 5' 4\" (1.626 m)   Wt 161 lb 13.1 oz (73.4 kg)   SpO2 96%   BMI 27.78 kg/m²     General appearance: No apparent distress, appears stated age and cooperative. HEENT: Pupils equal, round, and reactive to light. Conjunctivae/corneas clear. Neck: Supple, with full range of motion. No jugular venous distention. Trachea midline. Respiratory:  Normal respiratory effort. Diminished to auscultation, bilaterally with scattered rhonchi speaks in complete sentences  Cardiovascular: Regular rate and rhythm with normal S1/S2 without murmurs, rubs or gallops. Abdomen: Soft, non-tender, non-distended with normal bowel sounds. Musculoskeletal: passive and active ROM x 4 extremities. Skin: Skin color, texture, turgor normal.    Neurologic:  Neurovascularly intact without any focal sensory/motor deficits.  Cranial nerves: II-XII intact, grossly non-focal.  Psychiatric: Alert and oriented, thought content appropriate  Capillary Refill: Brisk,< 3 seconds   Peripheral Pulses: +2 palpable, equal bilaterally       Labs:   Recent Labs     11/04/22  1631 11/05/22  0526   WBC 14.6* 10.2   HGB 13.7 12.7   HCT 42.8 39.2    245       Recent Labs     11/04/22  1631 11/05/22  0526    133*   K 4.0 4.2    100   CO2 22* 23   BUN 15 20   CREATININE 0.7 0.7   CALCIUM 10.0 9.6       Microbiology:    Strep pneumonia (-)  Legionella is pending (-)  Molecular pneumonia panel positive for Moraxella catarrhalis and staph aureus    Urinalysis:      Lab Results   Component Value Date/Time    NITRU NEGATIVE 07/28/2022 12:01 AM    WBCUA  07/28/2022 12:01 AM    WBCUA 0-2 04/19/2012 10:10 AM    BACTERIA NONE SEEN 07/28/2022 12:01 AM    RBCUA 0-2 07/28/2022 12:01 AM    BLOODU TRACE 07/28/2022 12:01 AM    SPECGRAV 1.012 02/20/2021 06:30 PM    GLUCOSEU NEGATIVE 07/28/2022 12:01 AM       Radiology:  XR CHEST PORTABLE    Result Date: 11/4/2022  PROCEDURE: XR CHEST PORTABLE CLINICAL INFORMATION: 59-year-old female with cough and shortness of breath. COMPARISON: Radiographs 9/20/2022. TECHNIQUE: AP upright view of the chest was obtained. FINDINGS: Interstitial opacities are present throughout both lungs. The cardiac silhouette and pulmonary vasculature are within normal limits. There is no significant pleural effusion or pneumothorax. Visualized portions of the upper abdomen are within normal limits. The osseous structures are intact. No acute fractures or suspicious osseous lesions. Prominent interstitial infiltrates are present throughout the lungs on both sides. **This report has been created using voice recognition software. It may contain minor errors which are inherent in voice recognition technology. ** Final report electronically signed by Dr Yudelka Guadarrama on 11/4/2022 4:34 PM      DVT prophylaxis: [x] Lovenox                                 [] SCDs                                 [] SQ Heparin                                 [] Encourage ambulation           [] Already on Anticoagulation     Code Status: Full Code    PT/OT Eval Status: Ambulate    Tele:   [] Yes              [x] no    Active Hospital Problems    Diagnosis Date Noted    Acute exacerbation of chronic obstructive pulmonary disease (COPD) ([facility] [address] [J44.1] 11/04/2022     Priority: Medium       Electronically signed by BA Zapata CNP on 11/7/2022 at 6:56 AM

## 2022-11-07 NOTE — PLAN OF CARE
Medicare Wellness Visit  Plan for Preventive Care  Thank you for allowing us to participate in your care. Based on your examination today the following recommendations are being made. * Increase Glipizide 10 mg twice a day. * Add Hytrin 5 mg at bedtime. A good way for you to stay healthy is to use preventive care. Medicare covers many services that can help you stay healthy. * The goal of these services is to find any health problems as quickly as possible. Finding problems early can help make them easier to treat. Your personal plan below lists the services you may need and when they are due. Health Maintenance Summary     Topic Due On Due Status Completed On    Immunization - Pneumococcal  Completed Feb 7, 2017    Diabetes Eye Exam Aug 1, 2018 Not Due Aug 1, 2017    Glycohemoglobin A1C  (Diabetes Sugar)  May 12, 2018 Not Due Feb 12, 2018    GFR  (Kidney Function Test)  Feb 12, 2019 Not Due Feb 12, 2018    Diabetes Foot Exam  Feb 7, 2018 Due On Feb 7, 2017    [facility] Wellness Visit Feb 7, 2018 Due On Feb 7, 2017    IMMUNIZATION - DTaP/Tdap/Td Jul 14, 2021 Not Due Jul 14, 2011    Nimco Reynolds  Completed Oct 3, 2017    Depression Screening Feb 23, 2019 Not Due Feb 23, 2018           Preventive Care for Women and Men    Heart Screenings (Cardiovascular):  Â· Blood tests are used to check your cholesterol, lipid and triglyceride levels. High levels can increase your risk for heart disease and stroke. High levels can be treated with medications, diet and exercise. Lowering your levels can help keep your heart and blood vessels healthy. Your provider will order these tests if they are needed. Â· An ultrasound is done to see if you have an abdominal aortic aneurysm (AAA). This is an enlargement of one of the main blood vessels that delivers blood to the body. In the [facility], 9,000 deaths are caused by AAA.   You may not even know you have this problem and as many as 1 in 3 Problem: Respiratory - Adult  Goal: Clear lung sounds  Description: Clear lung sounds  11/7/2022 1655 by Joss Dockery RCP  Outcome: Progressing   Patient mutually agreed on goals. people will have a serious problem if it is not treated. Early diagnosis allows for more effective treatment and cure. If you have a family history of AAA or are a male age 70-76 who has smoked, you are at higher risk of an AAA. Your provider can order this test, if needed. Colorectal Screening:  Â· There are many tests that are used to check for cancer of your colon and rectum. You and your provider should discuss what test is best for you and when to have it done. Options include:  Â· Screening Colonoscopy: exam of the entire colon, seen through a flexible lighted tube. Â· Flexible Sigmoidoscopy: exam of the last third (sigmoid portion) of the colon and rectum, seen through a flexible lighted tube. Â· Cologuard DNA stool test: a sample of your stool is used to screen for cancer and unseen blood in your stool. Â· Fecal Occult Blood Test: a sample of your stool is studied to find any unseen blood    Flu Shot:  Â· An immunization that helps to prevent influenza (the flu). You should get this every year. The best time to get the shot is in the fall. Pneumococcal Shot:  â¢ Vaccines are available that can help prevent pneumococcal disease, which is any type of infection caused by Streptococcus pneumoniae bacteria. Their use can prevent some cases of pneumonia, meningitis, and sepsis. There are two types of pneumococcal vaccines:   o Conjugate vaccines (PCV-13 or Prevnar 13Â®) - helps protect against the 13 types of pneumococcal bacteria that are the most common causes of serious infections in children and adults. o Polysaccharide vaccine (PPSV23 or Fnkoqjxww66Â®) - helps protect against 23 types of pneumococcal bacteria for patients who are recommended to get it. These vaccines should be given at least 12 months apart. A booster is usually not needed. Hepatitis B Shot:  Â· An immunization that helps to protect people from getting Hepatitis B.  Hepatitis B is a virus that spreads through contact with infected blood or body fluids. Many people with the virus do not have symptoms. The virus can lead to serious problems, such as liver disease. Some people are at higher risk than others. Your doctor will tell you if you need this shot. Diabetes Screening:  Â· A test to measure sugar (glucose) in your blood is called a fasting blood sugar. Fasting means you cannot have food or drink for at least 8 hours before the test. This test can detect diabetes long before you may notice symptoms. Glaucoma Screening:  Â· Glaucoma screening is performed by your eye doctor. The test measures the fluid pressure inside your eyes to determine if you have glaucoma. Hepatitis C Screening:  Â· A blood test to see if you have the hepatitis C virus. Hepatitis C attacks the liver and is a major cause of chronic liver disease. Medicare will cover a single screening for all adults born between [facility], or high risk patients (people who have injected illegal drugs or people who have had blood transfusions). High risk patients who continue to inject illegal drugs can be screened for Hepatitis C every year. Smoking and Tobacco-Use Cessation Counseling:  Â· Tobacco is the single greatest cause of disease and early death in our country today. Medication and counseling together can increase a personâs chance of quitting for good. Â· Medicare covers two quitting attempts per year, with four counseling sessions per attempt (eight sessions in a 12 month period)    Preventive Screening tests for Women    Screening Mammograms and Breast Exams:  Â· An x-ray of your breasts to check for breast cancer before you or your doctor may be able to feel it. If breast cancer is found early it can usually be treated with success. Pelvic Exams and Pap Tests:  Â· An exam to check for cervical and vaginal cancer. A Pap test is a lab test in which cells are taken from your cervix and sent to the lab to look for signs of cervical cancer.  If cancer of the cervix is found early, chances for a cure are good. Testing can generally end at age 72, or if a woman has a hysterectomy for a benign condition. Your provider may recommend more frequent testing if certain abnormal results are found. Bone Mass Measurements:  Â· A painless x-ray of your bone density to see if you are at risk for a broken bone. Bone density refers to the thickness of bones or how tightly the bone tissue is packed. Preventive Screening tests for Men    Prostate Screening:  Â· PSA - Prostate Cancer blood test.  Experts do not recommend routine screening of healthy men with no signs or symptoms of prostate disease. However, men should not ignore urinary symptoms, and should discuss their family history with their doctor. *Medicare pays for many preventive services to keep you healthy. For some of these services, you might have to pay a deductible, coinsurance, and / or copayment. The amounts vary depending on the type of services you need and the kind of Medicare health plan you have.             For questions regarding your health or the recommendations that are being made today please contact our office at [phone] or [email or web address].

## 2022-11-07 NOTE — PLAN OF CARE
Problem: Discharge Planning  Goal: Discharge to home or other facility with appropriate resources  11/6/2022 2010 by Nayeli Leiva RN  Outcome: Mandy Guzman (Taken 11/6/2022 2010)  Discharge to home or other facility with appropriate resources: Identify barriers to discharge with patient and caregiver  Note: Patient expected to be discharge to home. Problem: Pain  Goal: Verbalizes/displays adequate comfort level or baseline comfort level  11/6/2022 2010 by Nayeli Leiva RN  Outcome: Progressing  Flowsheets (Taken 11/6/2022 2010)  Verbalizes/displays adequate comfort level or baseline comfort level:   Encourage patient to monitor pain and request assistance   Assess pain using appropriate pain scale  Note: Educated on pain rating scale. States pain 8/10 at lower chest. Patient request prn medication as ordered. States pain goal is to have no pain. Problem: ABCDS Injury Assessment  Goal: Absence of physical injury  11/6/2022 2010 by Nayeli Leiva RN  Outcome: Progressing  Flowsheets (Taken 11/6/2022 2010)  Absence of Physical Injury: Implement safety measures based on patient assessment  Note: No injuries noted at this time. Falling star program in place. Call light in reach. Patient refuses bed alarms. States \" I am walking fine, I can get up on my own to go to bathroom. \"     Problem: Respiratory - Adult  Goal: Clear lung sounds  Description: Clear lung sounds  11/6/2022 2010 by Nayeli Leiva RN  Outcome: Progressing  Note: Lung sounds diminished. On room air. Patient has moist productive cough with thick yellow secretions. Using incentive spirometry and acapella as ordered.       Problem: Respiratory - Adult  Goal: Levels of oxygenation will improve  Description: Levels of oxygenation will improve  11/6/2022 1903 by Nayeli Leiva RN  Outcome: Progressing     Problem: Chronic Conditions and Co-morbidities  Goal: Patient's chronic conditions and co-morbidity symptoms are monitored and maintained or improved  11/6/2022 2010 by Fernanda Caballero RN  Outcome: Progressing  Flowsheets (Taken 11/6/2022 2010)  Care Plan - Patient's Chronic Conditions and Co-Morbidity Symptoms are Monitored and Maintained or Improved: Monitor and assess patient's chronic conditions and comorbid symptoms for stability, deterioration, or improvement  Note: Monitoring chronic conditions as ordered. New or worsening s/s will be reported to provider. Care plan reviewed with patient. Patient verbalize understanding of the plan of care and contribute to goal setting.

## 2022-11-07 NOTE — CARE COORDINATION
Case Management Assessment  Initial Evaluation    Date/Time of Evaluation: 11/7/2022 12:20 PM  Assessment Completed by: Maria E Talbert RN    If patient is discharged prior to next notation, then this note serves as note for discharge by case management. Patient Name: Tunde Enriquez                   YOB: 1955  Diagnosis: Acute exacerbation of chronic obstructive pulmonary disease (COPD) ([facility] [address] [J44.1]  COPD exacerbation ([facility] [address] [J44.1]                   Date / Time: 11/4/2022  3:57 PM    Patient Admission Status: Inpatient     Current PCP: BA Hayden CNP  PCP verified by CM? Yes    Chart Reviewed: Yes      Patient Orientation: Alert and Oriented    Patient Cognition: Alert  History Provided by: Patient    Hospitalization in the last 30 days (Readmission):  No    If yes, Readmission Assessment in CM Navigator will be completed. Advance Directives:     Code Status: Full Code     Secondary Decision Maker (Active): Marcy Muñiz Brother/Sister - [phone]    Discharge Planning  Patient lives with: Spouse/Significant Other Type of Home: House   Primary Caregiver: Self  Patient Support Systems include: Spouse/Significant Other   Current Financial resources: Medicare  Current community resources:    Current services prior to admission: None   Type of Home Care services:  None    ADLS  Prior functional level: Independent in ADLs/IADLs  Current functional level: Independent in ADLs/IADLs      Family can provide assistance at DC: Yes  Would you like Case Management to discuss the discharge plan with any other family members/significant others, and if so, who?     Plans to Return to Present Housing: Yes  Other Identified Issues/Barriers to RETURNING to current housing: medical complications  Potential Assistance needed at discharge: [facility]  Patient expects to discharge to: [facility] for transportation at discharge: [address]: [address] / Plan: [facility] DUAL COMPLETE / Product Type: *No Product type* /     Does insurance require precert for SNF: Yes    Potential assistance Purchasing Medications: No  Meds-to-Beds request: Yes      [address], [address]  [address] [address]  Phone: [phone] Fax: [facility], [address], [address] [place] [phone] Nelly Lebron [phone]  [address]  [address] [facility]  Phone: [phone] Fax: [phone] - [place], [address] [phone] Nelly Lebron [phone]  [address] [address]  Phone: [phone] Fax: [phone]      Factors facilitating achievement of predicted outcomes: Caregiver support    Barriers to discharge: Medical complications    Additional Case Management Notes: From ED, Flu A,B,Covid (-), pneumonia panel (+) staph aureus, Moraxella catarrhalis, diabetes management, Acapella, PT/OT, room air  with saturations at 96%. Albuterol nebs, Asa, Lipitor, Zithromax, IV Rocephin, Lovenox, Pepcid, Protonix, Prednisone, Carafate. Procedure:   11/4 CXR Prominent interstitial infiltrates are present throughout the lungs on both sides. The Plan for Transition of Care is related to the following treatment goals of Acute exacerbation of chronic obstructive pulmonary disease (COPD) ([facility] [address] [J44.1]  COPD exacerbation ([facility] [address] [J44.1]    Patient Goals/Plan/Treatment Preferences: et with Shin Cruz. She currently lives at home with her significant other. She is independent. Plan is to return home at discharge. She denies need for DME but is requesting HH at discharge. SW consult placed. Will follow. Transportation/Food Security/Housekeeping Addressed: No issues identified.      Lkoi Stauffer RN  Case Management Department

## 2022-11-07 NOTE — PLAN OF CARE
Problem: Discharge Planning  Goal: Discharge to home or other facility with appropriate resources  11/7/2022 0525 by Chuy Duke RN  Outcome: Progressing  11/6/2022 2010 by Zakia Rivera RN  Outcome: Emile Burns (Taken 11/6/2022 2010)  Discharge to home or other facility with appropriate resources: Identify barriers to discharge with patient and caregiver  Note: Patient expected to be discharge to home. 11/6/2022 1903 by Zakia Rivera RN  Outcome: Progressing  Flowsheets (Taken 11/6/2022 1903)  Discharge to home or other facility with appropriate resources: Identify barriers to discharge with patient and caregiver  Note: Patient expected to be discharged to home  11/6/2022 1637 by Zakia Rivera RN  Outcome: Progressing  Flowsheets (Taken 11/6/2022 1637)  Discharge to home or other facility with appropriate resources: Identify barriers to discharge with patient and caregiver     Problem: Pain  Goal: Verbalizes/displays adequate comfort level or baseline comfort level  11/7/2022 0525 by Chuy Duke RN  Outcome: Progressing  11/6/2022 2010 by Zakia Rivera RN  Outcome: Progressing  Flowsheets (Taken 11/6/2022 2010)  Verbalizes/displays adequate comfort level or baseline comfort level:   Encourage patient to monitor pain and request assistance   Assess pain using appropriate pain scale  Note: Educated on pain rating scale. States pain 8/10 at lower chest. Patient request prn medication as ordered. States pain goal is to have no pain. 11/6/2022 1903 by Zakia Rivera RN  Outcome: Progressing  Flowsheets (Taken 11/6/2022 1903)  Verbalizes/displays adequate comfort level or baseline comfort level:   Encourage patient to monitor pain and request assistance   Assess pain using appropriate pain scale  Note: Educated on pain rating scale and prn pain medication ordered. States pain 8/10 at lower chest. Patient states \"I hurt due to coughing. \" States pain goal is to have no pain.  11/6/2022 1637 by Kehinde Sim RN  Outcome: Progressing     Problem: ABCDS Injury Assessment  Goal: Absence of physical injury  11/7/2022 0525 by Tiffany Madrigal RN  Outcome: Progressing  11/6/2022 2010 by Kehinde Sim RN  Outcome: Progressing  Flowsheets (Taken 11/6/2022 2010)  Absence of Physical Injury: Implement safety measures based on patient assessment  Note: No injuries noted at this time. Falling star program in place. Call light in reach. Patient refuses bed alarms. States \" I am walking fine, I can get up on my own to go to bathroom. \"  11/6/2022 1903 by Kehinde Sim RN  Outcome: Progressing  Flowsheets (Taken 11/6/2022 1903)  Absence of Physical Injury: Implement safety measures based on patient assessment  Note: No injuries noted at this time. Falling star program in place. Call light in reach. Patient refusing bed alarms. States \"I feel fine. I don't need the bed alarm. \" Patient verbalizes understanding on fall prevention. 11/6/2022 1637 by Kehinde Sim RN  Outcome: Progressing     Problem: Respiratory - Adult  Goal: Clear lung sounds  Description: Clear lung sounds  11/7/2022 0525 by Tiffany Madrigal RN  Outcome: Progressing  11/6/2022 2155 by Gissel Esteves RCP  Outcome: Progressing  11/6/2022 2010 by Kehinde Sim RN  Outcome: Progressing  Note: Lung sounds diminished. On room air. Patient has moist productive cough with thick yellow secretions. Using incentive spirometry and acapella as ordered. 11/6/2022 1903 by Kehinde Sim RN  Outcome: Progressing  Note: Lung sounds diminished. On room air.  Patient using incentive spirometry and ac  11/6/2022 1637 by Kehinde Sim RN  Outcome: Progressing  Goal: Levels of oxygenation will improve  Description: Levels of oxygenation will improve  11/7/2022 0525 by Tiffany Madrigal RN  Outcome: Progressing  11/6/2022 2010 by Kehinde Sim RN  Outcome: Progressing  11/6/2022 1903 by Kehinde Sim RN  Outcome: Progressing  11/6/2022 1637 by Lexy Poon RN  Outcome: Progressing     Problem: Chronic Conditions and Co-morbidities  Goal: Patient's chronic conditions and co-morbidity symptoms are monitored and maintained or improved  11/7/2022 0525 by Alexei Monteiro RN  Outcome: Progressing  11/6/2022 2010 by Lexy Poon RN  Outcome: Progressing  Flowsheets (Taken 11/6/2022 2010)  Care Plan - Patient's Chronic Conditions and Co-Morbidity Symptoms are Monitored and Maintained or Improved: Monitor and assess patient's chronic conditions and comorbid symptoms for stability, deterioration, or improvement  Note: Monitoring chronic conditions as ordered. New or worsening s/s will be reported to provider. 11/6/2022 1903 by Lexy Poon RN  Outcome: Progressing  11/6/2022 1637 by Lexy Poon RN  Outcome: Progressing   Care plan reviewed with patient. Patient verbalize understanding of the plan of care and contribute to goal setting.

## 2022-11-07 NOTE — PLAN OF CARE
Problem: Respiratory - Adult  Goal: Clear lung sounds  Description: Clear lung sounds  11/6/2022 2155 by Regino Jansen RCP  Outcome: Progressing   Continue inhaled medication to improve lung sounds. Patient mutually agrees on goals.

## 2022-11-07 NOTE — PLAN OF CARE
Problem: Respiratory - Adult  Goal: Clear lung sounds  Description: Clear lung sounds  11/7/2022 0739 by Kajal Vazquez RCP  Outcome: Progressing  Note: Maintenance  Patient mutually agreed on goals. 11/7/2022 0525 by Karl Gutierrez RN  Outcome: Progressing  11/6/2022 2155 by Gabriele Nuñez RCP  Outcome: Progressing  11/6/2022 2010 by Roby Dubois RN  Outcome: Progressing  Note: Lung sounds diminished. On room air. Patient has moist productive cough with thick yellow secretions. Using incentive spirometry and acapella as ordered. 11/6/2022 1903 by Roby Dubois RN  Outcome: Progressing  Note: Lung sounds diminished. On room air.  Patient using incentive spirometry and ac  Goal: Levels of oxygenation will improve  Description: Levels of oxygenation will improve  11/7/2022 0525 by Karl Gutierrez RN  Outcome: Progressing  11/6/2022 2010 by Roby Dubois RN  Outcome: Progressing  11/6/2022 1903 by Roby Dubois RN  Outcome: Progressing

## 2022-11-07 NOTE — CARE COORDINATION
DISCHARGE/PLANNING EVALUATION  11/7/22, 11:44 AM EST    Reason for Referral: Pt requesting [facility] at discharge  Mental Status: Patient is alert and oriented  Decision Making: Patient makes own decisions  Family/Social/Home Environment: Spoke with patient, assessment completed. Patient has three friends living with her in a one story home. She has a tub/shower. Is independent with ADL's. Friends do household chores and cooking. Her sister drives her to appointments and will be setting up [place] [facility] for transport as well. Current Services including food security, transportation and housekeeping: no current services. Current Equipment:none  Payment Source:[facility] medicare and Medicaid  Concerns or Barriers to Discharge: Patient is requesting HH at discharge, nursing, PT & OT. Preference is [facility]. [facility]) provider list was provided to patient. Patient was informed of their freedom to choose [facility] provider. Discussed and offered to show the patient the relevant [facility] Providers quality and resource use measures on Medicare Compare web site via computer based on patient's goals of care and treatment preferences. Questions regarding selection process were answered. Patient declined list, preference is [facility]. Teach Back Method used with patient regarding care plan and discharge planning. Patient  verbalize understanding of the plan of care and contribute to goal setting. Patient goals, treatment preferences and discharge plan: Patient plans home with friends and new [facility]. Electronically signed by REYES Lewis on 11/7/2022 at 11:44 AM    1:20 PM  Left message with [facility], referral made.

## 2022-11-07 NOTE — PLAN OF CARE
Chest x-ray reviewed and will give Lasix 20 mg IV push x1 today, reevaluate tomorrow, patient informed and agrees

## 2022-11-07 NOTE — RT PROTOCOL NOTE
RT Inhaler-Nebulizer Bronchodilator Protocol Note    There is a bronchodilator order in the chart from a provider indicating to follow the RT Bronchodilator Protocol and there is an Initiate RT Inhaler-Nebulizer Bronchodilator Protocol order as well (see protocol at bottom of note). CXR Findings:  No results found. The findings from the last RT Protocol Assessment were as follows:   History Pulmonary Disease: Chronic pulmonary disease  Respiratory Pattern: Dyspnea on exertion or RR 21-25 bpm  Breath Sounds: Slightly diminished and/or crackles  Cough: Strong, productive  Indication for Bronchodilator Therapy: Decreased or absent breath sounds, On home bronchodilators  Bronchodilator Assessment Score: 7    Aerosolized bronchodilator medication orders have been revised according to the RT Inhaler-Nebulizer Bronchodilator Protocol below. Respiratory Therapist to perform RT Therapy Protocol Assessment initially then follow the protocol. Repeat RT Therapy Protocol Assessment PRN for score 0-3 or on second treatment, BID, and PRN for scores above 3. No Indications - adjust the frequency to every 6 hours PRN wheezing or bronchospasm, if no treatments needed after 48 hours then discontinue using Per Protocol order mode. If indication present, adjust the RT bronchodilator orders based on the Bronchodilator Assessment Score as indicated below. Use Inhaler orders unless patient has one or more of the following: on home nebulizer, not able to hold breath for 10 seconds, is not alert and oriented, cannot activate and use MDI correctly, or respiratory rate 25 breaths per minute or more, then use the equivalent nebulizer order(s) with same Frequency and PRN reasons based on the score. If a patient is on this medication at home then do not decrease Frequency below that used at home.     0-3 - enter or revise RT bronchodilator order(s) to equivalent RT Bronchodilator order with Frequency of every 4 hours PRN for wheezing or increased work of breathing using Per Protocol order mode. 4-6 - enter or revise RT Bronchodilator order(s) to two equivalent RT bronchodilator orders with one order with BID Frequency and one order with Frequency of every 4 hours PRN wheezing or increased work of breathing using Per Protocol order mode. 7-10 - enter or revise RT Bronchodilator order(s) to two equivalent RT bronchodilator orders with one order with TID Frequency and one order with Frequency of every 4 hours PRN wheezing or increased work of breathing using Per Protocol order mode. 11-13 - enter or revise RT Bronchodilator order(s) to one equivalent RT bronchodilator order with QID Frequency and an Albuterol order with Frequency of every 4 hours PRN wheezing or increased work of breathing using Per Protocol order mode. Greater than 13 - enter or revise RT Bronchodilator order(s) to one equivalent RT bronchodilator order with every 4 hours Frequency and an Albuterol order with Frequency of every 2 hours PRN wheezing or increased work of breathing using Per Protocol order mode. RT to enter RT Home Evaluation for COPD & MDI Assessment order using Per Protocol order mode.     Electronically signed by Rohit Toussaint RCP on 11/6/2022 at 9:54 PM

## 2022-11-07 NOTE — RT PROTOCOL NOTE
RT Inhaler-Nebulizer Bronchodilator Protocol Note    There is a bronchodilator order in the chart from a provider indicating to follow the RT Bronchodilator Protocol and there is an Initiate RT Inhaler-Nebulizer Bronchodilator Protocol order as well (see protocol at bottom of note). CXR Findings:  No results found. The findings from the last RT Protocol Assessment were as follows:   History Pulmonary Disease: Chronic pulmonary disease  Respiratory Pattern: Regular pattern and RR 12-20 bpm  Breath Sounds: Slightly diminished and/or crackles  Cough: Strong, productive  Indication for Bronchodilator Therapy: Decreased or absent breath sounds  Bronchodilator Assessment Score: 5    Aerosolized bronchodilator medication orders have been revised according to the RT Inhaler-Nebulizer Bronchodilator Protocol below. Respiratory Therapist to perform RT Therapy Protocol Assessment initially then follow the protocol. Repeat RT Therapy Protocol Assessment PRN for score 0-3 or on second treatment, BID, and PRN for scores above 3. No Indications - adjust the frequency to every 6 hours PRN wheezing or bronchospasm, if no treatments needed after 48 hours then discontinue using Per Protocol order mode. If indication present, adjust the RT bronchodilator orders based on the Bronchodilator Assessment Score as indicated below. Use Inhaler orders unless patient has one or more of the following: on home nebulizer, not able to hold breath for 10 seconds, is not alert and oriented, cannot activate and use MDI correctly, or respiratory rate 25 breaths per minute or more, then use the equivalent nebulizer order(s) with same Frequency and PRN reasons based on the score. If a patient is on this medication at home then do not decrease Frequency below that used at home.     0-3 - enter or revise RT bronchodilator order(s) to equivalent RT Bronchodilator order with Frequency of every 4 hours PRN for wheezing or increased work of breathing using Per Protocol order mode. 4-6 - enter or revise RT Bronchodilator order(s) to two equivalent RT bronchodilator orders with one order with BID Frequency and one order with Frequency of every 4 hours PRN wheezing or increased work of breathing using Per Protocol order mode. 7-10 - enter or revise RT Bronchodilator order(s) to two equivalent RT bronchodilator orders with one order with TID Frequency and one order with Frequency of every 4 hours PRN wheezing or increased work of breathing using Per Protocol order mode. 11-13 - enter or revise RT Bronchodilator order(s) to one equivalent RT bronchodilator order with QID Frequency and an Albuterol order with Frequency of every 4 hours PRN wheezing or increased work of breathing using Per Protocol order mode. Greater than 13 - enter or revise RT Bronchodilator order(s) to one equivalent RT bronchodilator order with every 4 hours Frequency and an Albuterol order with Frequency of every 2 hours PRN wheezing or increased work of breathing using Per Protocol order mode. RT to enter RT Home Evaluation for COPD & MDI Assessment order using Per Protocol order mode.     Electronically signed by Cassandria Claude, RCP on 11/7/2022 at 7:39 AM

## 2022-11-08 VITALS
OXYGEN SATURATION: 96 % | WEIGHT: 161.82 LBS | RESPIRATION RATE: 18 BRPM | HEART RATE: 70 BPM | BODY MASS INDEX: 27.63 KG/M2 | HEIGHT: 64 IN | TEMPERATURE: 97.8 F | SYSTOLIC BLOOD PRESSURE: 129 MMHG | DIASTOLIC BLOOD PRESSURE: 62 MMHG

## 2022-11-08 LAB
ANION GAP SERPL CALCULATED.3IONS-SCNC: 10 MEQ/L (ref 8–16)
BUN BLDV-MCNC: 20 MG/DL (ref 7–22)
CALCIUM SERPL-MCNC: 9.6 MG/DL (ref 8.5–10.5)
CHLORIDE BLD-SCNC: 101 MEQ/L (ref 98–111)
CO2: 26 MEQ/L (ref 23–33)
CREAT SERPL-MCNC: 0.8 MG/DL (ref 0.4–1.2)
ERYTHROCYTE [DISTWIDTH] IN BLOOD BY AUTOMATED COUNT: 13.1 % (ref 11.5–14.5)
ERYTHROCYTE [DISTWIDTH] IN BLOOD BY AUTOMATED COUNT: 43.4 FL (ref 35–45)
GFR SERPL CREATININE-BSD FRML MDRD: > 60 ML/MIN/1.73M2
GLUCOSE BLD-MCNC: 121 MG/DL (ref 70–108)
GLUCOSE BLD-MCNC: 126 MG/DL (ref 70–108)
GLUCOSE BLD-MCNC: 202 MG/DL (ref 70–108)
GRAM STAIN RESULT: ABNORMAL
HCT VFR BLD CALC: 38.6 % (ref 37–47)
HEMOGLOBIN: 12.4 GM/DL (ref 12–16)
MCH RBC QN AUTO: 28.8 PG (ref 26–33)
MCHC RBC AUTO-ENTMCNC: 32.1 GM/DL (ref 32.2–35.5)
MCV RBC AUTO: 89.6 FL (ref 81–99)
ORGANISM: ABNORMAL
PLATELET # BLD: 323 THOU/MM3 (ref 130–400)
PMV BLD AUTO: 9.6 FL (ref 9.4–12.4)
POTASSIUM REFLEX MAGNESIUM: 4 MEQ/L (ref 3.5–5.2)
RBC # BLD: 4.31 MILL/MM3 (ref 4.2–5.4)
RESPIRATORY CULTURE: ABNORMAL
SODIUM BLD-SCNC: 137 MEQ/L (ref 135–145)
WBC # BLD: 12 THOU/MM3 (ref 4.8–10.8)

## 2022-11-08 PROCEDURE — 94640 AIRWAY INHALATION TREATMENT: CPT

## 2022-11-08 PROCEDURE — 6370000000 HC RX 637 (ALT 250 FOR IP): Performed by: NURSE PRACTITIONER

## 2022-11-08 PROCEDURE — 6370000000 HC RX 637 (ALT 250 FOR IP)

## 2022-11-08 PROCEDURE — 82948 REAGENT STRIP/BLOOD GLUCOSE: CPT

## 2022-11-08 PROCEDURE — 80048 BASIC METABOLIC PNL TOTAL CA: CPT

## 2022-11-08 PROCEDURE — 2580000003 HC RX 258

## 2022-11-08 PROCEDURE — 99239 HOSP IP/OBS DSCHRG MGMT >30: CPT | Performed by: NURSE PRACTITIONER

## 2022-11-08 PROCEDURE — 85027 COMPLETE CBC AUTOMATED: CPT

## 2022-11-08 PROCEDURE — 6360000002 HC RX W HCPCS

## 2022-11-08 PROCEDURE — 36415 COLL VENOUS BLD VENIPUNCTURE: CPT

## 2022-11-08 RX ORDER — AZITHROMYCIN 250 MG/1
250 TABLET, FILM COATED ORAL DAILY
Qty: 1 TABLET | Refills: 0 | Status: SHIPPED | OUTPATIENT
Start: 2022-11-09 | End: 2022-11-10

## 2022-11-08 RX ORDER — PREDNISONE 20 MG/1
40 TABLET ORAL DAILY
Qty: 4 TABLET | Refills: 0 | Status: SHIPPED | OUTPATIENT
Start: 2022-11-08 | End: 2022-11-10

## 2022-11-08 RX ORDER — GUAIFENESIN 600 MG/1
600 TABLET, EXTENDED RELEASE ORAL 2 TIMES DAILY
Qty: 20 TABLET | Refills: 0 | Status: SHIPPED | OUTPATIENT
Start: 2022-11-08

## 2022-11-08 RX ADMIN — LEVOTHYROXINE SODIUM 75 MCG: 0.07 TABLET ORAL at 07:04

## 2022-11-08 RX ADMIN — ESCITALOPRAM OXALATE 30 MG: 20 TABLET ORAL at 09:04

## 2022-11-08 RX ADMIN — INSULIN LISPRO 4 UNITS: 100 INJECTION, SOLUTION INTRAVENOUS; SUBCUTANEOUS at 12:39

## 2022-11-08 RX ADMIN — ENOXAPARIN SODIUM 40 MG: 100 INJECTION SUBCUTANEOUS at 00:57

## 2022-11-08 RX ADMIN — GUAIFENESIN 600 MG: 600 TABLET, EXTENDED RELEASE ORAL at 09:06

## 2022-11-08 RX ADMIN — ROFLUMILAST 500 MCG: 500 TABLET ORAL at 09:07

## 2022-11-08 RX ADMIN — FAMOTIDINE 20 MG: 20 TABLET ORAL at 09:07

## 2022-11-08 RX ADMIN — CLOPIDOGREL BISULFATE 75 MG: 75 TABLET ORAL at 09:07

## 2022-11-08 RX ADMIN — MOMETASONE FUROATE AND FORMOTEROL FUMARATE DIHYDRATE 2 PUFF: 200; 5 AEROSOL RESPIRATORY (INHALATION) at 09:15

## 2022-11-08 RX ADMIN — ALBUTEROL SULFATE 2.5 MG: 2.5 SOLUTION RESPIRATORY (INHALATION) at 09:14

## 2022-11-08 RX ADMIN — AZITHROMYCIN MONOHYDRATE 250 MG: 250 TABLET ORAL at 09:04

## 2022-11-08 RX ADMIN — TIOTROPIUM BROMIDE INHALATION SPRAY 2 PUFF: 3.12 SPRAY, METERED RESPIRATORY (INHALATION) at 09:15

## 2022-11-08 RX ADMIN — FENOFIBRATE 160 MG: 160 TABLET ORAL at 09:06

## 2022-11-08 RX ADMIN — PREDNISONE 40 MG: 20 TABLET ORAL at 11:05

## 2022-11-08 RX ADMIN — FERROUS SULFATE TAB 325 MG (65 MG ELEMENTAL FE) 325 MG: 325 (65 FE) TAB at 09:07

## 2022-11-08 RX ADMIN — SUCRALFATE 1 G: 1 TABLET ORAL at 09:06

## 2022-11-08 RX ADMIN — SUCRALFATE 1 G: 1 TABLET ORAL at 13:54

## 2022-11-08 RX ADMIN — ACETAMINOPHEN 650 MG: 325 TABLET ORAL at 10:20

## 2022-11-08 RX ADMIN — FOLIC ACID 1 MG: 1 TABLET ORAL at 09:06

## 2022-11-08 RX ADMIN — ALPRAZOLAM 0.25 MG: 0.25 TABLET ORAL at 00:58

## 2022-11-08 RX ADMIN — PANTOPRAZOLE SODIUM 40 MG: 40 TABLET, DELAYED RELEASE ORAL at 07:04

## 2022-11-08 RX ADMIN — ACETAMINOPHEN 650 MG: 325 TABLET ORAL at 00:58

## 2022-11-08 RX ADMIN — ALPRAZOLAM 0.25 MG: 0.25 TABLET ORAL at 10:20

## 2022-11-08 RX ADMIN — ARIPIPRAZOLE 2 MG: 2 TABLET ORAL at 09:06

## 2022-11-08 RX ADMIN — ASPIRIN 81 MG: 81 TABLET, COATED ORAL at 09:05

## 2022-11-08 RX ADMIN — SODIUM CHLORIDE, PRESERVATIVE FREE 10 ML: 5 INJECTION INTRAVENOUS at 09:14

## 2022-11-08 ASSESSMENT — PAIN SCALES - GENERAL
PAINLEVEL_OUTOF10: 7
PAINLEVEL_OUTOF10: 5
PAINLEVEL_OUTOF10: 5
PAINLEVEL_OUTOF10: 3
PAINLEVEL_OUTOF10: 6
PAINLEVEL_OUTOF10: 5

## 2022-11-08 ASSESSMENT — PAIN DESCRIPTION - PAIN TYPE
TYPE: CHRONIC PAIN

## 2022-11-08 ASSESSMENT — PAIN DESCRIPTION - ORIENTATION
ORIENTATION: MID;LOWER
ORIENTATION: LOWER;MID
ORIENTATION: LEFT;LOWER

## 2022-11-08 ASSESSMENT — PAIN DESCRIPTION - LOCATION
LOCATION: BACK

## 2022-11-08 ASSESSMENT — PAIN DESCRIPTION - ONSET
ONSET: ON-GOING
ONSET: ON-GOING

## 2022-11-08 ASSESSMENT — PAIN DESCRIPTION - DESCRIPTORS
DESCRIPTORS: ACHING;DISCOMFORT
DESCRIPTORS: ACHING
DESCRIPTORS: ACHING;DISCOMFORT

## 2022-11-08 ASSESSMENT — PAIN SCALES - WONG BAKER
WONGBAKER_NUMERICALRESPONSE: 0

## 2022-11-08 ASSESSMENT — PAIN DESCRIPTION - FREQUENCY: FREQUENCY: CONTINUOUS

## 2022-11-08 NOTE — DISCHARGE SUMMARY
Hospital Medicine Discharge Summary      Patient Identification:   Jae Gary   : 1955  MRN: [id number]   Account: [de-identified]      Patient's PCP: BA Pretty CNP    Admit Date: 2022     Discharge Date:   2022    Admitting Physician: No admitting provider for patient encounter. Discharging Nurse Practitioner: Babetta Prader, APRN - CNP     Discharge Diagnoses with Assessment/Plan:  Acute COPD exacerbation--on Proventil nebulizers twice a day, Spiriva; received Solu-Medrol 125 mg on  and started on prednisone 40 mg daily from 11/5 x 5 days; oxygen weaned off, patient states she quit smoking 4 days ago  Possible bilateral pneumonia (POA), likely secondary to gram-negative bacilli--Zithromax 11/4 x 5 days, Rocephin from -; has incentive spirometry and Acapella  Possible acute on chronic diastolic heart failure--echo from 2021 with EF 60 to 65% and grade 1 diastolic dysfunction; on Coreg; received Lasix 20 mg IV x1 dose yesterday and no documented output; aeration much improved; needs follow-up with PCP regarding possible need for diuretics  Elevated troponin--trended down to normal, likely demand mismatch  Acute hyperglycemia--likely secondary to steroids, hemoglobin A1c noted to be 6.5, on high dose Humalog sliding scale along with hypoglycemia protocol, glucose down to 126 today, will need outpatient follow-up  Leukocytosis--resulted to normal  Essential hypertension, uncontrolled--Coreg  Hypothyroidism--treated with Synthroid  Tobacco abuse--cessation counseling; NicoDerm 14 mg topically daily  History of urinary overflow incontinence     The patient was seen and examined on day of discharge and this discharge summary is in conjunction with any daily progress note from day of discharge.     Hospital Course:   Jae Gary is a 74 y.o. female admitted to [address] [place] [address] on 2022 for shortness of breath; Per H&P done 2022: Tabatha Alejandre is a 75 y/o  female with a PMHx of COPD, urinary incontinence who presents to [facility] ED today for the evaluation of shortness of breath. Pt states her symptoms started about 4 days ago, and has progressively worsened. She endorses associated body aches, headache, fatigue, lightheadedness, decreased appetite and PO intake x 24 hours, and productive cough. She states she did not take any of her medications yesterday, and has not taken anything to treat her symptoms. Pt  denies fever, chills, chest pain, abdominal pain, dizziness, changes in bowel or urinary habits. \"     11/5--> hemodynamically stable, satting 95% on 3 L of oxygen; sugar was quite high so hemoglobin A1c was checked and noted to be 6.5     11/6--> hemodynamically stable, on room air, much improved aeration today on exam; glucose better on high-dose sliding scale     11/7--> hemodynamically stable, on room air; on exam today lungs with rhonchi so check chest x-ray     11/8--> hemodynamically stable, on room air satting 95 to 96%, states she feels much better today, she states her breathing is better along with her cough, she is eating great, she is having some back pain which is chronic for her, she feels comfortable in going home and to follow-up outpatient, she will be discharged home in stable condition. Exam:     Vitals:  Vitals:    11/08/22 0345 11/08/22 0745 11/08/22 0908 11/08/22 1100   BP: (!) 146/67 133/63 133/63 129/62   Pulse: 58 56 55 70   Resp: 20 18  18   Temp: 97.5 °F (36.4 °C) 97.9 °F (36.6 °C)  97.8 °F (36.6 °C)   TempSrc: Oral Oral  Oral   SpO2: 96% 96%  96%   Weight:       Height:         Weight: Weight: 161 lb 13.1 oz (73.4 kg)     24 hour intake/output:  Intake/Output Summary (Last 24 hours) at 11/8/2022 1111  Last data filed at 11/7/2022 1953  Gross per 24 hour   Intake 440 ml   Output --   Net 440 ml         General appearance:  No apparent distress, appears stated age and cooperative.   HEENT:  Normal cephalic, atraumatic without obvious deformity. Pupils equal, round, and reactive to light. Conjunctivae/corneas clear. Neck: Supple, with full range of motion. No jugular venous distention. Trachea midline. Respiratory:  Normal respiratory effort. Diminished to auscultation, bilaterally without Rales/Wheezes/Rhonchi. Cardiovascular:  Regular rate and rhythm with normal S1/S2 without murmurs, rubs or gallops. Abdomen: Soft, non-tender, non-distended with normal bowel sounds. Musculoskeletal:  No clubbing, cyanosis or edema bilaterally. Full range of motion without deformity. Skin: Skin color, texture, turgor normal.    Neurologic:  Neurovascularly intact without any focal sensory/motor deficits. Cranial nerves: II-XII intact, grossly non-focal.  Psychiatric:  Alert and oriented, thought content appropriate  Capillary Refill: Brisk,< 3 seconds   Peripheral Pulses: +2 palpable, equal bilaterally       Labs: For convenience and continuity at follow-up the following most recent labs are provided:      CBC:    Lab Results   Component Value Date/Time    WBC 12.0 11/08/2022 07:48 AM    HGB 12.4 11/08/2022 07:48 AM    HCT 38.6 11/08/2022 07:48 AM     11/08/2022 07:48 AM       Renal:    Lab Results   Component Value Date/Time     11/08/2022 07:48 AM    K 4.0 11/08/2022 07:48 AM     11/08/2022 07:48 AM    CO2 26 11/08/2022 07:48 AM    BUN 20 11/08/2022 07:48 AM    CREATININE 0.8 11/08/2022 07:48 AM    CALCIUM 9.6 11/08/2022 07:48 AM    PHOS 3.2 11/17/2017 08:01 AM       Cardiac: No results for input(s): Yoon Echeverria in the last 72 hours. Significant Diagnostic Studies    Radiology:   XR CHEST (2 VW)   Final Result   Opacities near the left lung base which could represent infiltrates or atelectasis. **This report has been created using voice recognition software. It may contain minor errors which are inherent in voice recognition technology. **      Final report electronically signed by Dr Guanako Caceres on 11/7/2022 1:20 PM      XR CHEST PORTABLE   Final Result   Prominent interstitial infiltrates are present throughout the lungs on both sides. **This report has been created using voice recognition software. It may contain minor errors which are inherent in voice recognition technology. **      Final report electronically signed by Dr Guanako Caceres on 11/4/2022 4:34 PM             Consults:     IP CONSULT TO SOCIAL WORK  IP CONSULT TO HOME CARE NEEDS    Disposition:    [x] Home       [] TCU       [] Rehab       [] Psych       [] SNF       [] [place]       [] Other-    Condition at Discharge: Stable    Code Status:  Full Code     Pending tests at discharge:    none      Patient Instructions:    Discharge lab work: none  Activity: activity as tolerated  Diet: ADULT DIET;  Regular      Follow-up visits:   BA Simmons CNP  [address]  [phone]    Follow up on 11/15/2022  Follow up appointment November 15, 2022 at 2:30pm.    [facility] [address]  [facility] [place] [address]  [phone]               Discharge Medications:        Medication List        START taking these medications      azithromycin 250 MG tablet  Commonly known as: ZITHROMAX  Take 1 tablet by mouth daily for 1 dose  Start taking on: November 9, 2022     guaiFENesin 600 MG extended release tablet  Commonly known as: MUCINEX  Take 1 tablet by mouth 2 times daily     predniSONE 20 MG tablet  Commonly known as: DELTASONE  Take 2 tablets by mouth daily for 2 doses            CHANGE how you take these medications      traZODone 100 MG tablet  Commonly known as: DESYREL  Take 2 tablets by mouth nightly  What changed:   how much to take  additional instructions            CONTINUE taking these medications      acetaminophen 325 MG tablet  Commonly known as: TYLENOL     * albuterol (2.5 MG/3ML) 0.083% nebulizer solution  Commonly known as: PROVENTIL  Take 3 mLs by nebulization every 6 hours as needed for Wheezing     * albuterol sulfate  (90 Base) MCG/ACT inhaler  Commonly known as: Proventil HFA  Inhale 2 puffs into the lungs every 6 hours as needed for Wheezing     ARIPiprazole 2 MG tablet  Commonly known as: ABILIFY     aspirin 81 MG EC tablet  Take 1 tablet by mouth daily     atorvastatin 40 MG tablet  Commonly known as: LIPITOR     carvedilol 3.125 MG tablet  Commonly known as: COREG     escitalopram 20 MG tablet  Commonly known as: LEXAPRO     famotidine 20 MG tablet  Commonly known as: PEPCID     fenofibrate 160 MG tablet  Commonly known as: TRIGLIDE     ferrous sulfate 325 (65 Fe) MG tablet  Commonly known as: IRON 325  Take 1 tablet by mouth 2 times daily     fluticasone 50 MCG/ACT nasal spray  Commonly known as: FLONASE  1 spray by Each Nostril route 2 times daily     folic acid 1 MG tablet  Commonly known as: FOLVITE     hydrOXYzine pamoate 50 MG capsule  Commonly known as: VISTARIL  Take 1 capsule by mouth 3 times daily as needed for Itching or Anxiety     ibuprofen 600 MG tablet  Commonly known as: IBU  Take 1 tablet by mouth every 6 hours as needed for Pain     levothyroxine 75 MCG tablet  Commonly known as: SYNTHROID  Take 1 tablet by mouth daily everyday except Sunday take 150 mcg.     nabumetone 500 MG tablet  Commonly known as: RELAFEN     nicotine 21 MG/24HR  Commonly known as: NICODERM CQ  Place 1 patch onto the skin in the morning.      nicotine polacrilex 4 MG lozenge  Commonly known as: COMMIT  Take 1 lozenge by mouth as needed for Smoking cessation (max 20 per day)     omeprazole 40 MG delayed release capsule  Commonly known as: PRILOSEC  Take 1 capsule by mouth every morning (before breakfast)     ondansetron 4 MG disintegrating tablet  Commonly known as: ZOFRAN-ODT  Take 1 tablet by mouth 3 times daily as needed for Nausea or Vomiting     Plavix 75 MG tablet  Generic drug: clopidogrel     QUEtiapine 300 MG tablet  Commonly known as: SEROQUEL     Roflumilast 500 MCG tablet  Commonly known as: Daliresp  Take 1 tablet by mouth daily     sucralfate 1 GM tablet  Commonly known as: Carafate  Take 1 tablet by mouth 4 times daily     Trelegy Ellipta 100-62.5-25 MCG/INH Aepb  Generic drug: fluticasone-umeclidin-vilant  Inhale 1 puff into the lungs daily           * This list has 2 medication(s) that are the same as other medications prescribed for you. Read the directions carefully, and ask your doctor or other care provider to review them with you. STOP taking these medications      ciprofloxacin 500 MG tablet  Commonly known as: Cipro     pantoprazole 40 MG tablet  Commonly known as: Protonix     phenazopyridine 200 MG tablet  Commonly known as: Pyridium     potassium chloride 20 MEQ extended release tablet  Commonly known as: KLOR-CON M     topiramate 50 MG tablet  Commonly known as: Topamax            ASK your doctor about these medications      nitroGLYCERIN 0.4 MG SL tablet  Commonly known as: NITROSTAT  up to max of 3 total doses. If no relief after 1 dose, call 911. Where to Get Your Medications        These medications were sent to [facility] [address], [address] [facility] [address]      Phone: [phone]   azithromycin 250 MG tablet  guaiFENesin 600 MG extended release tablet  predniSONE 20 MG tablet         Time Spent on discharge is more than 45 minutes in the examination, evaluation, counseling and review of medications and discharge plan. Signed: Thank you BA Doan CNP for the opportunity to be involved in this patient's care.     Electronically signed by BA Davila CNP on 11/8/2022 at 11:11 AM

## 2022-11-08 NOTE — CARE COORDINATION
11/8/22, 2:36 PM EST    Patient goals/plan/ treatment preferences discussed by  and . Patient goals/plan/ treatment preferences reviewed with patient/ family. Patient/ family verbalize understanding of discharge plan and are in agreement with goal/plan/treatment preferences. Understanding was demonstrated using the teach back method. AVS provided by RN at time of discharge, which includes all necessary medical information pertaining to the patients current course of illness, treatment, post-discharge goals of care, and treatment preferences. Services At/After Discharge: Home Health, Nursing service, OT, and PT       IMM Letter  IMM Letter given to Patient/Family/Significant other/Guardian/POA/by[de-identified] INDY Rocha CM  IMM Letter date given[de-identified] 11/06/22  IMM Letter time given[de-identified] 0386     Patient discharged home with friends and new [facility]. Left message with [facility] to notify of discharge.

## 2022-11-08 NOTE — PROGRESS NOTES
Discharge instructions reviewed with patient. Verbalizes 100% of understanding. No questions at this time. To notify staff when sister, who is taking patient home, arrives. IV has been removed and no redness or edema noted to site.

## 2022-11-08 NOTE — PLAN OF CARE
Discharged this afternoon, discharge instructions and medications to be reviewed. Alert and oriented x4 with no barriers noted.

## 2022-11-08 NOTE — PLAN OF CARE
Problem: Respiratory - Adult  Goal: Clear lung sounds  Description: Clear lung sounds  Outcome: Progressing     Problem: Respiratory - Adult  Goal: Levels of oxygenation will improve  Description: Levels of oxygenation will improve  Outcome: Progressing   Continue therapy as ordered to achieve and maintain clear breath sounds and improve aeration.

## 2022-11-08 NOTE — ED PROVIDER NOTES
[place] ENCOUNTER          Pt Name: Adalberto Sales  MRN: [id number]  [place] 1955  Date of evaluation: 11/4/2022  Treating Resident Physician: Haylee Ayon MD  Supervising Physician: Dr. Christine Weinstein    History obtained from the patient. CHIEF COMPLAINT       Chief Complaint   Patient presents with    Cough    Shortness of Breath           HISTORY OF PRESENT ILLNESS    HPI  Adalberto Sales is a 74 y.o. female who presents to the emergency department for evaluation of shortness of breath    The patient presented with a cough productive of yellow sputum. She produces approximately 2 teaspoons of sputum when she has coughing episode. She has been coughing for 4 days. She states that no one at home has been sick, and she is also experiencing headache, shortness of breath, dry mouth, wheezing, and chest pain when she coughs. The patient also experiences some lightheadedness and dizziness when she is coughing and short of breath  The patient has no other acute complaints at this time. REVIEW OF SYSTEMS   Review of Systems   Constitutional:  Positive for activity change and fatigue. Negative for chills and fever. HENT:  Positive for postnasal drip and sore throat. Eyes:  Negative for visual disturbance. Respiratory:  Positive for cough, chest tightness, shortness of breath and wheezing. Cardiovascular:  Positive for chest pain. Gastrointestinal:  Negative for abdominal pain, constipation, diarrhea, nausea and vomiting. Endocrine: Negative for polyuria. Genitourinary:  Negative for difficulty urinating, dysuria, hematuria and urgency. Musculoskeletal:  Negative for neck pain and neck stiffness. Skin:  Negative for color change. Neurological:  Positive for dizziness, light-headedness and headaches. Negative for syncope. Psychiatric/Behavioral:  Negative for agitation.            PAST MEDICAL AND SURGICAL HISTORY Past Medical History:   Diagnosis Date    Acute renal failure with acute cortical necrosis ([address] 12/21/2019    Allergic rhinitis     Arthritis     spine    Bipolar 1 disorder (HCC)     Blood circulation, collateral     Cancer ([address] 2011    cancerous polyps removed - Dr. Gloria Ulloa    Cataract     Colon polyps     COPD (chronic obstructive pulmonary disease) ([address] 7/24/2014    COPD (chronic obstructive pulmonary disease) (HCC)     Coronary vasospasm ([address] 8/17/2019    Depression     Diverticulitis of colon     GERD (gastroesophageal reflux disease)     Glaucoma     Hearing loss     Hiatal hernia     History of arterial ischemic stroke 7/20/2019    History of colonoscopy 2002    History of kidney stones     Hx of blood clots 6/17/2014    PE and collar bone area after shoulder surgery    Hyperlipidemia     Hypertension     Liver disease     enlarged liver - damaged with alcohol in past but no cirrhosis per patient    Pneumonia 7/24/2014    Sexual problems     Suicidal thoughts     2015 admitted to  from [facility]    Thyroid disease     Urinary incontinence     Vomiting     Wears dentures     Wears glasses      Past Surgical History:   Procedure Laterality Date    ABDOMEN SURGERY      x4 removed cysts and ovary removed    CARDIAC SURGERY      heart caths, no stents    CARPAL TUNNEL RELEASE Bilateral 2016    CHOLECYSTECTOMY, LAPAROSCOPIC  6/12/15    Dr. Sandra Ayala N/A 2/10/2022    CYSTOSCOPY URETHRAL IMPLANT INJECTION performed [place]k Combs [place] [facility] [place] 7/13/2020    CYSTOSCOPY WITH BLADDER BIOPSIES performed by Guillermina Melara MD at [facility], ESOPHAGUS      EGD      ELBOW SURGERY Right 10/7/2004    Dr. Eduarda Brower Bilateral 2016    decompression    HYSTERECTOMY ([facility])  1998    Dr. Palma Males with [facility] [address]  2004, 2014    right shoulder    SHOULDER SURGERY  2014    right    SKIN BIOPSY  2006    under left eye    STIMULATOR SURGERY N/A 11/4/2020    STAGE 1 INTERSTIM PLACEMENT performed by Rad Rivera MD at [address] [place] [address] 11/23/2020    PLACEMENT OF STAGE II INTERSTIM performed by Rad Rivera MD at [address] [place] [address] 4/14/2022    Removal of Interstim performed by Antolin Alejo MD at [address]   No current facility-administered medications for this encounter.     Current Outpatient Medications:     predniSONE (DELTASONE) 20 MG tablet, Take 2 tablets by mouth daily for 2 doses, Disp: 4 tablet, Rfl: 0    guaiFENesin (MUCINEX) 600 MG extended release tablet, Take 1 tablet by mouth 2 times daily, Disp: 20 tablet, Rfl: 0    [START ON 11/9/2022] azithromycin (ZITHROMAX) 250 MG tablet, Take 1 tablet by mouth daily for 1 dose, Disp: 1 tablet, Rfl: 0    ondansetron (ZOFRAN-ODT) 4 MG disintegrating tablet, Take 1 tablet by mouth 3 times daily as needed for Nausea or Vomiting, Disp: 21 tablet, Rfl: 0    nicotine polacrilex (COMMIT) 4 MG lozenge, Take 1 lozenge by mouth as needed for Smoking cessation (max 20 per day), Disp: 100 each, Rfl: 3    nicotine (NICODERM CQ) 21 MG/24HR, Place 1 patch onto the skin in the morning., Disp: 42 patch, Rfl: 0    Roflumilast (DALIRESP) 500 MCG tablet, Take 1 tablet by mouth daily, Disp: 30 tablet, Rfl: 5    albuterol sulfate HFA (PROVENTIL HFA) 108 (90 Base) MCG/ACT inhaler, Inhale 2 puffs into the lungs every 6 hours as needed for Wheezing, Disp: 18 g, Rfl: 3    nabumetone (RELAFEN) 500 MG tablet, Take 500 mg by mouth 2 times daily, Disp: , Rfl:     omeprazole (PRILOSEC) 40 MG delayed release capsule, Take 1 capsule by mouth every morning (before breakfast), Disp: 30 capsule, Rfl: 0    ibuprofen (IBU) 600 MG tablet, Take 1 tablet by mouth every 6 hours as needed for Pain, Disp: 40 tablet, Rfl: 0    fluticasone-umeclidin-vilant (TRELEGY ELLIPTA) 100-62.5-25 MCG/INH AEPB, Inhale 1 puff into the lungs daily, Disp: 60 each, Rfl: 5    QUEtiapine (SEROQUEL) 300 MG tablet, Take 300 mg by mouth nightly 2 tab, Disp: , Rfl:     carvedilol (COREG) 3.125 MG tablet, TAKE 1 TABLET BY MOUTH TWICE DAILY, Disp: , Rfl:     famotidine (PEPCID) 20 MG tablet, Take 20 mg by mouth daily, Disp: , Rfl:     aspirin 81 MG EC tablet, Take 1 tablet by mouth daily, Disp: 30 tablet, Rfl: 3    hydrOXYzine (VISTARIL) 50 MG capsule, Take 1 capsule by mouth 3 times daily as needed for Itching or Anxiety, Disp: 90 capsule, Rfl: 0    sucralfate (CARAFATE) 1 GM tablet, Take 1 tablet by mouth 4 times daily, Disp: 120 tablet, Rfl: 3    clopidogrel (PLAVIX) 75 MG tablet, Take 75 mg by mouth daily, Disp: , Rfl:     albuterol (PROVENTIL) (2.5 MG/3ML) 0.083% nebulizer solution, Take 3 mLs by nebulization every 6 hours as needed for Wheezing, Disp: 120 each, Rfl: 0    acetaminophen (TYLENOL) 325 MG tablet, Take by mouth every 6 hours as needed for Pain 2 tab, Disp: , Rfl:     levothyroxine (SYNTHROID) 75 MCG tablet, Take 1 tablet by mouth daily everyday except Sunday take 150 mcg., Disp: 30 tablet, Rfl: 0    fluticasone (FLONASE) 50 MCG/ACT nasal spray, 1 spray by Each Nostril route 2 times daily, Disp: 1 Bottle, Rfl: 0    ferrous sulfate 325 (65 Fe) MG tablet, Take 1 tablet by mouth 2 times daily, Disp: 30 tablet, Rfl: 0    folic acid (FOLVITE) 1 MG tablet, Take 1 mg by mouth daily, Disp: , Rfl:     ARIPiprazole (ABILIFY) 2 MG tablet, Take 2 mg by mouth daily, Disp: , Rfl:     atorvastatin (LIPITOR) 40 MG tablet, Take 40 mg by mouth nightly , Disp: , Rfl:     escitalopram (LEXAPRO) 20 MG tablet, Take 30 mg by mouth daily , Disp: , Rfl:     fenofibrate 160 MG tablet, Take 160 mg by mouth daily , Disp: , Rfl:     traZODone (DESYREL) 100 MG tablet, Take 2 tablets by mouth nightly, Disp: 30 tablet, Rfl: 0      SOCIAL HISTORY     Social History     Social History Narrative    Not on file     Social History     Tobacco Use    Smoking status: Every Day     Packs/day: 0.50     Years: 30.00     Pack years: 15.00     Types: Cigarettes     Start date: 4/22/1977    Smokeless tobacco: Never    Tobacco comments:     Trying to quit   Vaping Use    Vaping Use: Never used   Substance Use Topics    Alcohol use: No     Comment: none for 2 years    Drug use: Not Currently     Frequency: 1.0 times per week     Types: Cocaine         ALLERGIES     Allergies   Allergen Reactions    Seasonal Other (See Comments)     sneezing         FAMILY HISTORY     Family History   Problem Relation Age of Onset    Cancer Mother     Heart Disease Mother     High Blood Pressure Mother     High Cholesterol Mother     Cancer Father         brain    Depression Father     Heart Disease Father     High Cholesterol Father     Mental Illness Father     Cancer Sister     Heart Attack Sister     Heart Disease Maternal Uncle     High Cholesterol Maternal Uncle     Diabetes Maternal Grandmother     Heart Disease Maternal Grandmother     High Cholesterol Maternal Grandmother     Early Death Maternal Grandfather     Heart Disease Maternal Grandfather     High Cholesterol Maternal Grandfather     Stroke Maternal Grandfather     Heart Disease Paternal Grandmother     High Cholesterol Paternal Grandmother     Heart Disease Paternal Grandfather     High Cholesterol Paternal Grandfather     Colon Cancer Neg Hx     Inflam Bowel Dis Neg Hx          PREVIOUS RECORDS   Previous records reviewed:  [facility] . PHYSICAL EXAM     ED Triage Vitals [11/04/22 1606]   BP Temp Temp Source Heart Rate Resp SpO2 Height Weight   117/66 99.5 °F (37.5 °C) Oral 81 26 93 % 5' 4\" (1.626 m) 164 lb (74.4 kg)     Initial vital signs and nursing assessment reviewed and abnormal from hypoxia on arrival . Body mass index is 27.78 kg/m². Pulsoximetry is abnormal per my interpretation.     Additional Vital Signs:  Vitals:    11/08/22 1100   BP: 129/62   Pulse: 70   Resp: 18   Temp: 97.8 °F (36.6 °C)   SpO2: 96% Constitutional:       Appearance: She is obese. She is ill-appearing. She is not toxic-appearing or diaphoretic. Comments: Supraclavicular and subcostal retractions. HENT:      Mouth/Throat:      Mouth: Mucous membranes are moist.      Pharynx: Oropharynx is clear. Eyes:      Extraocular Movements: Extraocular movements intact. Pupils: Pupils are equal, round, and reactive to light. Cardiovascular:      Rate and Rhythm: Normal rate and regular rhythm. Pulses: Normal pulses. Heart sounds: Normal heart sounds. Pulmonary:      Effort: Tachypnea and respiratory distress present. Breath sounds: Examination of the right-upper field reveals wheezing. Examination of the left-upper field reveals wheezing. Examination of the right-middle field reveals wheezing. Examination of the left-middle field reveals wheezing. Examination of the right-lower field reveals wheezing. Examination of the left-lower field reveals wheezing. Wheezing present. Chest:      Chest wall: Tenderness present. No crepitus. Abdominal:      Palpations: Abdomen is soft. Tenderness: There is no abdominal tenderness. There is no guarding or rebound. Musculoskeletal:      Cervical back: Normal range of motion and neck supple. Skin:     General: Skin is warm and dry. Capillary Refill: Capillary refill takes 2 to 3 seconds. Neurological:      Mental Status: She is alert and oriented to person, place, and time. Psychiatric:         Mood and Affect: Mood normal.         Behavior: Behavior normal.       MEDICAL DECISION MAKING   Initial Assessment:   The patient is a 28-year-old female with an extensive past medical history, noting COPD, hypertension, CAD, who presents to the emergency department with 4 days of shortness of breath, productive cough, chest pain. The patient is tachypneic in the 30s, in respiratory distress.   Afebrile, blood pressure is on the low side considering she is hypertensive normally.        Ddx: COVID-19, influenza, community-acquired pneumonia, COPD exacerbation from respiratory infection, ACS  Plan:   Chest x-ray, troponins, EKG  CBC, BNP, BMP, lactate, Pro-Issac  COVID-19 and influenza rapid tests  IV fluids, duo nebs, Solu-Medrol      ED RESULTS   Laboratory results:  Labs Reviewed   PNEUMONIA PANEL, MOLECULAR - Abnormal; Notable for the following components:       Result Value    Moraxella catarrhalis by PCR Detected (*)     Staph aureus by PCR Detected (*)     All other components within normal limits   CULTURE, RESPIRATORY - Abnormal; Notable for the following components:    Organism Moraxella catarrhalis (*)     All other components within normal limits    Narrative:     Source: Expectorated sputum       Site:           Current Antibiotics: Azithromycin   CBC WITH AUTO DIFFERENTIAL - Abnormal; Notable for the following components:    WBC 14.6 (*)     MCHC 32.0 (*)     RDW-SD 46.5 (*)     Segs Absolute 11.9 (*)     All other components within normal limits   PROCALCITONIN - Abnormal; Notable for the following components:    Procalcitonin 0.11 (*)     All other components within normal limits   BASIC METABOLIC PANEL W/ REFLEX TO MG FOR LOW K - Abnormal; Notable for the following components:    CO2 22 (*)     Glucose 115 (*)     All other components within normal limits   BRAIN NATRIURETIC PEPTIDE - Abnormal; Notable for the following components:    Pro-BNP 1110.0 (*)     All other components within normal limits   TROPONIN - Abnormal; Notable for the following components:    Troponin T 0.029 (*)     All other components within normal limits   TROPONIN - Abnormal; Notable for the following components:    Troponin T 0.013 (*)     All other components within normal limits   BASIC METABOLIC PANEL W/ REFLEX TO MG FOR LOW K - Abnormal; Notable for the following components:    Sodium 133 (*)     Glucose 309 (*)     All other components within normal limits   CBC WITH AUTO DIFFERENTIAL - Abnormal; Notable for the following components:    Segs Absolute 8.6 (*)     All other components within normal limits   HEMOGLOBIN A1C - Abnormal; Notable for the following components:    Hemoglobin A1C 6.5 (*)     AVERAGE GLUCOSE 135 (*)     All other components within normal limits   BRAIN NATRIURETIC PEPTIDE - Abnormal; Notable for the following components:    Pro-BNP 1157.0 (*)     All other components within normal limits   CBC - Abnormal; Notable for the following components:    WBC 12.0 (*)     MCHC 32.1 (*)     All other components within normal limits   BASIC METABOLIC PANEL W/ REFLEX TO MG FOR LOW K - Abnormal; Notable for the following components:    Glucose 121 (*)     All other components within normal limits   POCT GLUCOSE - Abnormal; Notable for the following components:    POC Glucose 272 (*)     All other components within normal limits   POCT GLUCOSE - Abnormal; Notable for the following components:    POC Glucose 241 (*)     All other components within normal limits   POCT GLUCOSE - Abnormal; Notable for the following components:    POC Glucose 363 (*)     All other components within normal limits   POCT GLUCOSE - Abnormal; Notable for the following components:    POC Glucose 303 (*)     All other components within normal limits   POCT GLUCOSE - Abnormal; Notable for the following components:    POC Glucose 197 (*)     All other components within normal limits   POCT GLUCOSE - Abnormal; Notable for the following components:    POC Glucose 234 (*)     All other components within normal limits   POCT GLUCOSE - Abnormal; Notable for the following components:    POC Glucose 327 (*)     All other components within normal limits   POCT GLUCOSE - Abnormal; Notable for the following components:    POC Glucose 430 (*)     All other components within normal limits   POCT GLUCOSE - Abnormal; Notable for the following components:    POC Glucose 122 (*)     All other components within normal limits   POCT GLUCOSE - Abnormal; Notable for the following components:    POC Glucose 288 (*)     All other components within normal limits   POCT GLUCOSE - Abnormal; Notable for the following components:    POC Glucose 408 (*)     All other components within normal limits   POCT GLUCOSE - Abnormal; Notable for the following components:    POC Glucose 336 (*)     All other components within normal limits   POCT GLUCOSE - Abnormal; Notable for the following components:    POC Glucose 126 (*)     All other components within normal limits   POCT GLUCOSE - Abnormal; Notable for the following components:    POC Glucose 202 (*)     All other components within normal limits   COVID-19 & INFLUENZA COMBO   STREP PNEUMONIAE ANTIGEN   LEGIONELLA ANTIGEN, URINE   LACTIC ACID   GLOMERULAR FILTRATION RATE, ESTIMATED   TROPONIN   ANION GAP   GLOMERULAR FILTRATION RATE, ESTIMATED   ANION GAP   OSMOLALITY   GLOMERULAR FILTRATION RATE, ESTIMATED   ANION GAP       Radiologic studies results:  XR CHEST (2 VW)   Final Result   Opacities near the left lung base which could represent infiltrates or atelectasis. **This report has been created using voice recognition software. It may contain minor errors which are inherent in voice recognition technology. **      Final report electronically signed by Dr Marci Kuo on 11/7/2022 1:20 PM      XR CHEST PORTABLE   Final Result   Prominent interstitial infiltrates are present throughout the lungs on both sides. **This report has been created using voice recognition software. It may contain minor errors which are inherent in voice recognition technology. **      Final report electronically signed by Dr Marci Kuo on 11/4/2022 4:34 PM          ED Medications administered this visit:   Medications   ipratropium-albuterol (DUONEB) nebulizer solution 1 ampule (1 ampule Inhalation Given 11/4/22 1701)   methylPREDNISolone sodium (SOLU-MEDROL) injection 80 mg (80 mg IntraVENous Given 11/4/22 8694)   lactated ringers infusion ( IntraVENous New Bag 11/4/22 1708)   furosemide (LASIX) injection 20 mg (20 mg IntraVENous Given 11/7/22 1403)         ED COURSE        Patient was in moderate respiratory distress on arrival, tachypneic to the 30s, retractions, complicated medical history,      Condition improved with supplemental oxygen, duo nebs, Solu-Medrol, however, decision was made to admit the patient for overnight observation. MEDICATION CHANGES     Discharge Medication List as of 11/8/2022  2:07 PM        START taking these medications    Details   predniSONE (DELTASONE) 20 MG tablet Take 2 tablets by mouth daily for 2 doses, Disp-4 tablet, R-0Normal      guaiFENesin (MUCINEX) 600 MG extended release tablet Take 1 tablet by mouth 2 times daily, Disp-20 tablet, R-0Normal      azithromycin (ZITHROMAX) 250 MG tablet Take 1 tablet by mouth daily for 1 dose, Disp-1 tablet, R-0Normal               FINAL DISPOSITION     Final diagnoses:   COPD exacerbation (HCC)     Condition: condition: stable  Dispo: Admit to med/surg floor      This transcription was electronically signed. Parts of this transcriptions may have been dictated by use of voice recognition software and electronically transcribed, and parts may have been transcribed with the assistance of an ED scribe. The transcription may contain errors not detected in proofreading. Please refer to my supervising physician's documentation if my documentation differs.     Electronically Signed: Zoey Null MD, 11/08/22, 6:04 PM        Zoey Null MD  Resident  11/08/22 [id number]       Zoey Null MD  Resident  11/08/22 [id number]

## 2022-12-06 ENCOUNTER — TELEPHONE (OUTPATIENT)
Dept: UROLOGY | Age: 67
End: 2022-12-06

## 2022-12-06 DIAGNOSIS — Z01.818 PRE-OP TESTING: ICD-10-CM

## 2022-12-06 DIAGNOSIS — N20.0 NEPHROLITHIASIS: ICD-10-CM

## 2022-12-06 DIAGNOSIS — N39.490 URINARY INCONTINENCE, OVERFLOW: Primary | ICD-10-CM

## 2022-12-06 NOTE — TELEPHONE ENCOUNTER
SURGERY [address] [place] [address] [address] [place], [address]      Phone *[phone] *[phone]   Surgical Scheduling Direct Line Phone *[phone] Fax *[phone]      Cruz Swartz 1955 female    [address]  [place] [address]   Marital Status:          Home Phone: [phone]      Cell Phone:    Telephone Information:   Mobile [phone]          Surgeon: Dr. Sommer [facility] Surgery Date: 12/27/ 22  Time: 10:30 AM    Procedure: Left ESWL (Extracorporeal Shock Wave Lithotripsy), Cystoscopy bladder Botox 200 units, Left stent placement    Diagnosis: Kidney stone     Important Medical History:  In [facility]    Special Inst/Equip:              Next Med [place] [facility] confirmation#[id number]    CPT Codes:    [id number], [id number], [id number], [id number]  Latex Allergy: No     Cardiac Device:  No    Anesthesia:  General          Admission Type:  Same Day                        Admit Prior to Day of Surgery: No    Case Location:  Main OR            Preadmission Testing:  Phone Call          PAT Date and Time:______________________________________________________    PAT Confirmation #: ______________________________________________________    Post Op Visit: ___________________________________________________________    Need Preop Cardiac Clearance: Yes    Does Patient have Cardiologist/physician?      Prabhjot Moore CNP    Surgery Confirmation #: __________________________________________________    : ________________________   Date: __________________________     [employer] Name: [facility] Medicare

## 2022-12-06 NOTE — TELEPHONE ENCOUNTER
Patient is scheduled for surgery with  [facility] on 12/27/22. Surgery consent on arrival. Dylan Cardenas CNP to clear. Patient to do pre op urine culture on 12/13/22. Surgery instructions gone over verbally and mailed to patient. Next Juanjo Arreguin conform#[id number].

## 2022-12-06 NOTE — TELEPHONE ENCOUNTER
DO NOT TAKE  FISH OIL, MOBIC,COUMADIN, IBUPROFEN, MOTRIN-LIKE DRUGS AND ANY MULTIVITAMINS OR OVER THE COUNTER SUPPLEMENTS 14 DAYS PRIOR TO SURGERY. MUST HAVE AN ADULT OVER THE AGE OF 18 WITH YOU AT THE TIME OF THE DISCHARGE AND WITH YOU AT HOME AFTER THE PROCEDURE FOR 24 HOURS     **HOLD ASPIRIN AND PLAVIX ACCORDING TO CARDIOLOGISTRiveth Wheeler 1955 Diagnosis:     Surgical Physician: Dr. Nohelia Rebolledo have been scheduled for the procedure marked below:      Surgery: Left ESWL (Extracorporeal Shock Wave Lithotripsy), Cystoscopy bladder Botox 200 units, Left stent placement         Date: 12/27/22     Anesthesia: Anesthesiologist (General/Spinal)     Place of Service: [facility] Second Floor Same Day Surgery         Arrive to same day surgery by:  8:30 AM  (Surgery time is subject to change)      INSTRUCTIONS AS MARKED BELOW:    1.  DO NOT eat or drink anything after midnight before surgery. 2.  We prefer you shower or bathe with an antibacterial soap (Dial) the morning of surgery. 3  Please bring a current medication list, photo ID and insurance card(s) with you  4. Okay to take Tylenol  5. If you take Glucophage, Metformin or Janumet, hold 48-hours prior to surgery  6  Take blood pressure or heart medication as directed, if taken in the morning take with a small sip of water  7. The office will call you in 1-2 days after your procedure to schedule a follow up. DATE SENSITIVE TESTING-DO ON THE DATE LISTED *WALK IN *NO APPOINTMENT    DO PRE OP URINE CULTURE ON 12/13/22. ORDER INCLUDED.         Date: 12/6/2022

## 2022-12-06 NOTE — TELEPHONE ENCOUNTER
Patient is scheduled for Left ESWL (Extracorporeal Shock Wave Lithotripsy), Cystoscopy bladder Botox 200 units, Left stent placement with Dr Koko Calvo on 12/27/22. We are asking for clearance. Thank you.

## 2022-12-13 ENCOUNTER — PREP FOR PROCEDURE (OUTPATIENT)
Dept: UROLOGY | Age: 67
End: 2022-12-13

## 2022-12-15 RX ORDER — POTASSIUM CHLORIDE 20 MEQ/1
20 TABLET, EXTENDED RELEASE ORAL DAILY
Status: ON HOLD | COMMUNITY
Start: 2022-12-05

## 2022-12-15 NOTE — PROGRESS NOTES
Follow all instructions given by your physician  NPO after midnight   Sips of water am of surgery with allowed medications  Bring insurance info and 's license  Wear comfortable clean, loose fitting clothing  No jewelry or contact lenses to be worn day of surgery  No glue on dentures morning of surgery;you will be asked to remove them for surgery. Case for glasses. Shower night before and morning of surgery with a liquid antibacterial soap, dry with fresh clean towel; no lotions, creams or powder. Clean sheets and pillow case on bed night before surgery  Bring medications in original bottles  Bring CPAP/BIPAP machine if you have one ( you may be charged if one is needed in recovery room )    Please refer to the SSI-Surgical Site Infection Flyer you hopefully received in the mail-together we can prevent infections; signs and symptoms reviewed. When discharged be sure you understand how to care for your wound and that you have the phone # to call if you have any concerns or questions about your wound.  needed at discharge and someone over 18 to stay with you for 24 hours overnight (surgery may be cancelled if you don't have this)  Report to [facility] on 2nd floor  If you would become ill prior to surgery, please call the surgeon  May have a visitor with you, we request that you limit to 2 visitors in pre-op area  Masks are recommended but not required, new masks at entrance desk  Call [phone] for any questions    Covid questionnaire Complete; Patient negative for symptoms or exposure. See documentation.

## 2022-12-22 ENCOUNTER — TELEPHONE (OUTPATIENT)
Dept: UROLOGY | Age: 67
End: 2022-12-22

## 2022-12-22 RX ORDER — SODIUM CHLORIDE 9 MG/ML
INJECTION, SOLUTION INTRAVENOUS PRN
Status: CANCELLED | OUTPATIENT
Start: 2022-12-22

## 2022-12-22 RX ORDER — SODIUM CHLORIDE 0.9 % (FLUSH) 0.9 %
5-40 SYRINGE (ML) INJECTION PRN
Status: CANCELLED | OUTPATIENT
Start: 2022-12-22

## 2022-12-22 RX ORDER — SODIUM CHLORIDE 0.9 % (FLUSH) 0.9 %
5-40 SYRINGE (ML) INJECTION EVERY 12 HOURS SCHEDULED
Status: CANCELLED | OUTPATIENT
Start: 2022-12-22

## 2022-12-22 RX ORDER — IPRATROPIUM BROMIDE AND ALBUTEROL SULFATE 2.5; .5 MG/3ML; MG/3ML
1 SOLUTION RESPIRATORY (INHALATION) ONCE
Status: CANCELLED | OUTPATIENT
Start: 2022-12-22 | End: 2022-12-22

## 2022-12-22 NOTE — TELEPHONE ENCOUNTER
Per Lakshmi Cornejo at [facility] SITE CPT codes 90915, [id number], [id number], [id number] auth# [id number].

## 2022-12-23 DIAGNOSIS — J41.1 MUCOPURULENT CHRONIC BRONCHITIS (HCC): ICD-10-CM

## 2022-12-23 DIAGNOSIS — J44.9 STAGE 2 MODERATE COPD BY GOLD CLASSIFICATION (HCC): Primary | ICD-10-CM

## 2022-12-27 ENCOUNTER — HOSPITAL ENCOUNTER (OUTPATIENT)
Age: 67
Setting detail: OUTPATIENT SURGERY
Discharge: HOME | End: 2022-12-27
Attending: UROLOGY | Admitting: UROLOGY
Payer: MEDICARE

## 2022-12-27 ENCOUNTER — TELEPHONE (OUTPATIENT)
Dept: UROLOGY | Age: 67
End: 2022-12-27

## 2022-12-27 ENCOUNTER — ANESTHESIA EVENT (OUTPATIENT)
Dept: OPERATING ROOM | Age: 67
End: 2022-12-27
Payer: MEDICARE

## 2022-12-27 ENCOUNTER — ANESTHESIA (OUTPATIENT)
Dept: OPERATING ROOM | Age: 67
End: 2022-12-27
Payer: MEDICARE

## 2022-12-27 VITALS
SYSTOLIC BLOOD PRESSURE: 152 MMHG | RESPIRATION RATE: 18 BRPM | BODY MASS INDEX: 26.46 KG/M2 | OXYGEN SATURATION: 96 % | HEIGHT: 64 IN | DIASTOLIC BLOOD PRESSURE: 78 MMHG | TEMPERATURE: 97 F | WEIGHT: 155 LBS | HEART RATE: 65 BPM

## 2022-12-27 DIAGNOSIS — Z01.818 PRE-OP TESTING: ICD-10-CM

## 2022-12-27 DIAGNOSIS — R10.9 FLANK PAIN: Primary | ICD-10-CM

## 2022-12-27 DIAGNOSIS — N20.0 NEPHROLITHIASIS: Primary | ICD-10-CM

## 2022-12-27 PROCEDURE — 3700000000 HC ANESTHESIA ATTENDED CARE: Performed by: UROLOGY

## 2022-12-27 PROCEDURE — 7100000010 HC PHASE II RECOVERY - FIRST 15 MIN: Performed by: UROLOGY

## 2022-12-27 PROCEDURE — 3700000001 HC ADD 15 MINUTES (ANESTHESIA): Performed by: UROLOGY

## 2022-12-27 PROCEDURE — 6360000002 HC RX W HCPCS: Performed by: NURSE ANESTHETIST, CERTIFIED REGISTERED

## 2022-12-27 PROCEDURE — 2709999900 HC NON-CHARGEABLE SUPPLY: Performed by: UROLOGY

## 2022-12-27 PROCEDURE — 3600000012 HC SURGERY LEVEL 2 ADDTL 15MIN: Performed by: UROLOGY

## 2022-12-27 PROCEDURE — 3600000002 HC SURGERY LEVEL 2 BASE: Performed by: UROLOGY

## 2022-12-27 PROCEDURE — 2580000003 HC RX 258: Performed by: UROLOGY

## 2022-12-27 PROCEDURE — C1874 STENT, COATED/COV W/DEL SYS: HCPCS | Performed by: UROLOGY

## 2022-12-27 PROCEDURE — 6360000002 HC RX W HCPCS: Performed by: UROLOGY

## 2022-12-27 PROCEDURE — 87081 CULTURE SCREEN ONLY: CPT

## 2022-12-27 PROCEDURE — 6370000000 HC RX 637 (ALT 250 FOR IP): Performed by: UROLOGY

## 2022-12-27 PROCEDURE — 7100000011 HC PHASE II RECOVERY - ADDTL 15 MIN: Performed by: UROLOGY

## 2022-12-27 DEVICE — BLACK SILICONE FILIFORM DOUBLE PIGTAIL URETERAL STENT SET
Type: IMPLANTABLE DEVICE | Site: URETER | Status: FUNCTIONAL
Brand: COOK

## 2022-12-27 RX ORDER — MIDAZOLAM HYDROCHLORIDE 1 MG/ML
INJECTION INTRAMUSCULAR; INTRAVENOUS PRN
Status: DISCONTINUED | OUTPATIENT
Start: 2022-12-27 | End: 2022-12-27 | Stop reason: SDUPTHER

## 2022-12-27 RX ORDER — PHENAZOPYRIDINE HYDROCHLORIDE 200 MG/1
200 TABLET, FILM COATED ORAL 3 TIMES DAILY PRN
Qty: 9 TABLET | Refills: 0 | Status: ON HOLD | OUTPATIENT
Start: 2022-12-27

## 2022-12-27 RX ORDER — OXYBUTYNIN CHLORIDE 5 MG/1
10 TABLET ORAL ONCE
Status: COMPLETED | OUTPATIENT
Start: 2022-12-27 | End: 2022-12-27

## 2022-12-27 RX ORDER — SODIUM CHLORIDE 9 MG/ML
INJECTION, SOLUTION INTRAVENOUS PRN
Status: DISCONTINUED | OUTPATIENT
Start: 2022-12-27 | End: 2022-12-27 | Stop reason: HOSPADM

## 2022-12-27 RX ORDER — SODIUM CHLORIDE 0.9 % (FLUSH) 0.9 %
5-40 SYRINGE (ML) INJECTION PRN
Status: DISCONTINUED | OUTPATIENT
Start: 2022-12-27 | End: 2022-12-27 | Stop reason: HOSPADM

## 2022-12-27 RX ORDER — OXYCODONE HYDROCHLORIDE AND ACETAMINOPHEN 5; 325 MG/1; MG/1
1 TABLET ORAL EVERY 6 HOURS PRN
Qty: 20 TABLET | Refills: 0 | Status: SHIPPED | OUTPATIENT
Start: 2022-12-27 | End: 2022-12-30

## 2022-12-27 RX ORDER — KETOROLAC TROMETHAMINE 30 MG/ML
30 INJECTION, SOLUTION INTRAMUSCULAR; INTRAVENOUS ONCE
Status: COMPLETED | OUTPATIENT
Start: 2022-12-27 | End: 2022-12-27

## 2022-12-27 RX ORDER — IPRATROPIUM BROMIDE AND ALBUTEROL SULFATE 2.5; .5 MG/3ML; MG/3ML
1 SOLUTION RESPIRATORY (INHALATION) ONCE
Status: DISCONTINUED | OUTPATIENT
Start: 2022-12-27 | End: 2022-12-27 | Stop reason: HOSPADM

## 2022-12-27 RX ORDER — SODIUM CHLORIDE 0.9 % (FLUSH) 0.9 %
5-40 SYRINGE (ML) INJECTION EVERY 12 HOURS SCHEDULED
Status: DISCONTINUED | OUTPATIENT
Start: 2022-12-27 | End: 2022-12-27 | Stop reason: HOSPADM

## 2022-12-27 RX ORDER — PROPOFOL 10 MG/ML
INJECTION, EMULSION INTRAVENOUS PRN
Status: DISCONTINUED | OUTPATIENT
Start: 2022-12-27 | End: 2022-12-27 | Stop reason: SDUPTHER

## 2022-12-27 RX ADMIN — Medication 50 MG: at 12:13

## 2022-12-27 RX ADMIN — SODIUM CHLORIDE: 9 INJECTION, SOLUTION INTRAVENOUS at 10:41

## 2022-12-27 RX ADMIN — HYDROMORPHONE HYDROCHLORIDE 1 MG: 1 INJECTION, SOLUTION INTRAMUSCULAR; INTRAVENOUS; SUBCUTANEOUS at 13:08

## 2022-12-27 RX ADMIN — PROPOFOL 50 MG: 10 INJECTION, EMULSION INTRAVENOUS at 12:13

## 2022-12-27 RX ADMIN — PROPOFOL 50 MG: 10 INJECTION, EMULSION INTRAVENOUS at 12:17

## 2022-12-27 RX ADMIN — KETOROLAC TROMETHAMINE 30 MG: 30 INJECTION, SOLUTION INTRAMUSCULAR; INTRAVENOUS at 13:08

## 2022-12-27 RX ADMIN — PROPOFOL 75 MG: 10 INJECTION, EMULSION INTRAVENOUS at 12:33

## 2022-12-27 RX ADMIN — OXYBUTYNIN CHLORIDE 10 MG: 5 TABLET ORAL at 13:08

## 2022-12-27 RX ADMIN — CEFAZOLIN 2000 MG: 10 INJECTION, POWDER, FOR SOLUTION INTRAVENOUS at 12:21

## 2022-12-27 RX ADMIN — MIDAZOLAM 2 MG: 1 INJECTION INTRAMUSCULAR; INTRAVENOUS at 12:10

## 2022-12-27 ASSESSMENT — PAIN DESCRIPTION - LOCATION: LOCATION: ABDOMEN

## 2022-12-27 ASSESSMENT — PAIN SCALES - GENERAL
PAINLEVEL_OUTOF10: 5
PAINLEVEL_OUTOF10: 10
PAINLEVEL_OUTOF10: 10

## 2022-12-27 ASSESSMENT — PAIN - FUNCTIONAL ASSESSMENT: PAIN_FUNCTIONAL_ASSESSMENT: 0-10

## 2022-12-27 ASSESSMENT — PAIN DESCRIPTION - ORIENTATION: ORIENTATION: LOWER

## 2022-12-27 ASSESSMENT — PAIN DESCRIPTION - DESCRIPTORS: DESCRIPTORS: ACHING

## 2022-12-27 NOTE — H&P
History and Physical    Patient:  Adalberto Sales  MRN: [id number]  YOB: 1955    CHIEF COMPLAINT:  OAb    HISTORY OF PRESENT ILLNESS:   The patient is a 74 y.o. female who presents with as above, here for surgery    Patient's old records, notes and chart reviewed and summarized above.      Past Medical History:    Past Medical History:   Diagnosis Date    Acute renal failure with acute cortical necrosis ([address] 12/21/2019    Allergic rhinitis     Arthritis     back, arms, hips    Bipolar 1 disorder ([address]     Blood circulation, collateral     Cancer ([address] 2011    cancerous polyps removed - Dr. Morris Kyle    Cataract     Colon polyps     COPD (chronic obstructive pulmonary disease) ([address] 07/24/2014    COPD (chronic obstructive pulmonary disease) ([address]     Coronary vasospasm ([address] 08/17/2019    Depression     Diverticulitis of colon     GERD (gastroesophageal reflux disease)     Glaucoma     Hearing loss     Hiatal hernia     History of arterial ischemic stroke 07/20/2019    History of colonoscopy 2002    History of kidney stones     Hx of blood clots 06/17/2014    PE and collar bone area after shoulder surgery    Hyperlipidemia     Hypertension     Liver disease     enlarged liver - damaged with alcohol in past but no cirrhosis per patient    Pneumonia 07/24/2014    Sexual problems     Suicidal thoughts     2015 admitted to 4E from [facility]    Thyroid disease     Urinary incontinence     Vomiting     Wears dentures     Wears glasses        Past Surgical History:    Past Surgical History:   Procedure Laterality Date    ABDOMEN SURGERY      x4 removed cysts and ovary removed    CARDIAC SURGERY      heart caths, no stents    CARPAL TUNNEL RELEASE Bilateral 2016    CHOLECYSTECTOMY, LAPAROSCOPIC  6/12/15    Dr. Lamin Crouch  2011    CYSTOSCOPY N/A 2/10/2022    CYSTOSCOPY URETHRAL IMPLANT INJECTION performed by Bre Mars MD at [address] [place] [address] N/A 7/13/2020 CYSTOSCOPY WITH BLADDER BIOPSIES performed by Thuy Wall MD at [address], ESOPHAGUS      EGD      ELBOW SURGERY Right 10/7/2004    Dr. Sima Barron Bilateral 2016    decompression    HYSTERECTOMY ([address])  1998    Dr. Annamarie Longo with [address] CUFF REPAIR  2004, 2014    right shoulder    SHOULDER SURGERY  2014    right    SKIN BIOPSY  2006    under left eye    STIMULATOR SURGERY N/A 11/4/2020    STAGE 1 INTERSTIM PLACEMENT performed by Thuy Wall MD at [facility] 11/23/2020    PLACEMENT OF STAGE II INTERSTIM performed by Thuy Wall MD at [facility] [address] N/A 4/14/2022    Removal of Interstim performed by Carmine Contreras MD at [facility]     Medications Prior to Admission:    Prior to Admission medications    Medication Sig Start Date End Date Taking?  Authorizing Provider   potassium chloride (KLOR-CON M) 20 MEQ extended release tablet Take 20 mEq by mouth daily 12/5/22   Historical Provider, MD   guaiFENesin (MUCINEX) 600 MG extended release tablet Take 1 tablet by mouth 2 times daily  Patient not taking: Reported on 12/15/2022 11/8/22   BA Davila - CNP   ondansetron (ZOFRAN-ODT) 4 MG disintegrating tablet Take 1 tablet by mouth 3 times daily as needed for Nausea or Vomiting 7/28/22   Carole Christian, DO   omeprazole (PRILOSEC) 40 MG delayed release capsule Take 1 capsule by mouth every morning (before breakfast) 4/26/22   Carole Christian, DO   fluticasone-umeclidin-vilant (TRELEGY ELLIPTA) 100-62.5-25 MCG/INH AEPB Inhale 1 puff into the lungs daily 3/31/22   Keisha English MD   QUEtiapine (SEROQUEL) 300 MG tablet Take 300 mg by mouth nightly 2 tab    Historical Provider, MD   carvedilol (COREG) 3.125 MG tablet TAKE 1 TABLET BY MOUTH TWICE DAILY 7/1/20   Historical Provider, MD   aspirin 81 MG EC tablet Take 1 tablet by mouth daily 2/22/21   Isa Elliott MD   hydrOXYzine (VISTARIL) 50 MG capsule Take 1 capsule by mouth 3 times daily as needed for Itching or Anxiety 2/22/21   Janis Nelson MD   sucralfate (CARAFATE) 1 GM tablet Take 1 tablet by mouth 4 times daily 2/22/21   Janis Nelson MD   clopidogrel (PLAVIX) 75 MG tablet Take 75 mg by mouth daily    Historical Provider, MD   albuterol (PROVENTIL) (2.5 MG/3ML) 0.083% nebulizer solution Take 3 mLs by nebulization every 6 hours as needed for Wheezing 8/26/20   BA Mcadams CNP   acetaminophen (TYLENOL) 325 MG tablet Take by mouth every 6 hours as needed for Pain 2 tab    Historical Provider, MD   levothyroxine (SYNTHROID) 75 MCG tablet Take 1 tablet by mouth daily everyday except Sunday take 150 mcg. 12/26/19   BA Sommer CNP   fluticasone (FLONASE) 50 MCG/ACT nasal spray 1 spray by Each Nostril route 2 times daily 12/26/19   BA Sommer CNP   ferrous sulfate 325 (65 Fe) MG tablet Take 1 tablet by mouth 2 times daily 12/26/19   BA Sommer CNP   folic acid (FOLVITE) 1 MG tablet Take 1 mg by mouth daily    Historical Provider, MD   ARIPiprazole (ABILIFY) 2 MG tablet Take 2 mg by mouth daily    Historical Provider, MD   atorvastatin (LIPITOR) 40 MG tablet Take 40 mg by mouth nightly     Historical Provider, MD   escitalopram (LEXAPRO) 20 MG tablet Take 30 mg by mouth daily     Historical Provider, MD   fenofibrate 160 MG tablet Take 160 mg by mouth daily     Historical Provider, MD   traZODone (DESYREL) 100 MG tablet Take 2 tablets by mouth nightly 10/31/18   Martin Carr MD       Allergies:  Seasonal    Social History:    Social History     Socioeconomic History    Marital status:      Spouse name: Not on file    Number of children: 3    Years of education: 12    Highest education level: Not on file   Occupational History    Occupation: on disability     Employer: [employer]   Tobacco Use    Smoking status: Every Day Packs/day: 0.50     Years: 30.00     Pack years: 15.00     Types: Cigarettes     Start date: 4/22/1977    Smokeless tobacco: Never    Tobacco comments:     Trying to quit   Vaping Use    Vaping Use: Never used   Substance and Sexual Activity    Alcohol use: No     Comment: none for 2 years    Drug use: Not Currently     Frequency: 1.0 times per week     Types: Cocaine    Sexual activity: Not Currently   Other Topics Concern    Not on file   Social History Narrative    Not on file     Social Determinants of Health     Financial Resource Strain: Not on file   Food Insecurity: Not on file   Transportation Needs: Not on file   Physical Activity: Not on file   Stress: Not on file   Social Connections: Not on file   Intimate Partner Violence: Not on file   Housing Stability: Not on file       Family History:    Family History   Problem Relation Age of Onset    Cancer Mother     Heart Disease Mother     High Blood Pressure Mother     High Cholesterol Mother     Cancer Father         brain    Depression Father     Heart Disease Father     High Cholesterol Father     Mental Illness Father     Cancer Sister     Heart Attack Sister     Heart Disease Maternal Uncle     High Cholesterol Maternal Uncle     Diabetes Maternal Grandmother     Heart Disease Maternal Grandmother     High Cholesterol Maternal Grandmother     Early Death Maternal Grandfather     Heart Disease Maternal Grandfather     High Cholesterol Maternal Grandfather     Stroke Maternal Grandfather     Heart Disease Paternal Grandmother     High Cholesterol Paternal Grandmother     Heart Disease Paternal Grandfather     High Cholesterol Paternal Grandfather     Colon Cancer Neg Hx     Inflam Bowel Dis Neg Hx        REVIEW OF SYSTEMS:  Constitutional: negative  Eyes: negative  Respiratory: negative  Cardiovascular: negative  Gastrointestinal: negative  Genitourinary: see HPI  Musculoskeletal: negative  Skin: negative   Neurological: negative  Hematological/Lymphatic: negative  Psychological: negative    Physical Exam:      Patient Vitals for the past 24 hrs:   BP Temp Temp src Pulse Resp SpO2 Height Weight   12/27/22 1031 (!) 108/59 97.1 °F (36.2 °C) Temporal 63 18 95 % 5' 4\" (1.626 m) 155 lb (70.3 kg)     Constitutional: Patient in no acute distress; Neuro: alert and oriented to person place and time. Psych: Mood and affect normal.  Skin: Normal  Lungs: Respiratory effort normal, CTA  Cardiovascular:  Normal peripheral pulses; no murmur  Abdomen: Soft, non-tender, non-distended with no CVA, flank pain, hepatosplenomegaly or hernia. Kidneys normal.  Bladder non-tender and not distended. LABS:   No results for input(s): WBC, HGB, HCT, MCV, PLT in the last 72 hours. No results for input(s): NA, K, CL, CO2, PHOS, BUN, CREATININE, CA in the last 72 hours. No results found for: PSA        Urinalysis: No results for input(s): COLORU, PHUR, LABCAST, WBCUA, RBCUA, MUCUS, TRICHOMONAS, YEAST, BACTERIA, CLARITYU, SPECGRAV, LEUKOCYTESUR, UROBILINOGEN, [place] in the last 72 hours.     Invalid input(s): NITRATE, GLUCOSEUKETONESUAMORPHOUS     -----------------------------------------------------------------      Assessment and Plan   Impression:    Patient Active Problem List   Diagnosis    GERD (gastroesophageal reflux disease)    Colon polyps    Chest pain, atypical    Gastroenteritis    Pneumonia    Shoulder pain    History of pulmonary embolism    COPD (chronic obstructive pulmonary disease) (HCC)    Hypoglycemia    Anticoagulated on Coumadin    Bipolar disorder (HCC)    Cannabis abuse    Cocaine abuse ([facility]    Alcohol dependence in remission ([facility]    Cholecystitis with cholelithiasis    GI bleed    Erosive esophagitis    Hypokalemia    HTN (hypertension), benign    Bipolar 1 disorder (HCC)    Chronic pain    Hiatal hernia    Chest pain    Vomiting    Erosive gastritis    H pylori ulcer    Hematemesis    History of Helicobacter pylori infection    Intractable abdominal pain    Intractable vomiting with nausea    Epigastric abdominal pain    Acute blood loss anemia    Chronic chest pain    Hyponatremia    Metabolic acidosis    Essential hypertension    COPD with acute exacerbation (HCC)    Hypothyroidism    History of DVT (deep vein thrombosis)    Depression    Tobacco abuse    Hematemesis with nausea    Hypoxia    Pleural effusion, bilateral    Suicidal ideation    Bipolar disorder, in partial remission, most recent episode depressed (HCC)    Bipolar II disorder ([address]    Diastolic dysfunction    Angina, class II (HCC)    Unstable angina (HCC)    Dyslipidemia    Electrolyte abnormality    COPD exacerbation (HCC)    Acute respiratory insufficiency    Chronic diastolic heart failure (HCC)    Tobacco use disorder    Nasal septal deviation    Hypertrophy of left inferior nasal turbinate    Rhinogenic headache    Asymmetrical sensorineural hearing loss    Tinnitus, left ear    Psychosis (HCC)    Substance-induced psychotic disorder (HCC)    Bipolar 1 disorder, depressed ([address]    Polysubstance abuse ([address]    Major depressive disorder, recurrent ([facility])    Stroke-like symptom    Right arm weakness    Delirium    Paresthesias    Depression, major, recurrent ([address]    Depression with suicidal ideation    Amnesia    Bipolar disorder, current episode mixed, moderate (HCC)    Otsqwpkmz-Lwctxan-Lizckw disease    Carpal tunnel syndrome of left wrist    Change of, skin texture    Chronic midline low back pain without sciatica    Coronary vasospasm (HCC)    Derangement of knee    Disorder associated with Helicobacter species    Disturbance of skin sensation    Encounter for surgical follow-up care    Endometriosis    Environmental and seasonal allergies    Glaucoma suspect    History of arterial ischemic stroke    Mixed hyperlipidemia    Large liver    Lesion of ulnar nerve    Nasal congestion    Nicotine dependence    Pancreatic insufficiency    Primary osteoarthritis involving multiple joints    Sciatica    Sprain of right foot    Preoperative state    Type 2 diabetes mellitus without complication, without long-term current use of insulin (HCC)    Urinary incontinence, overflow    Elevated lactic acid level    Acute renal failure with acute cortical necrosis (HCC)    Pyelonephritis of left kidney    Pyelonephritis    Septicemia (HCC)    Acute exacerbation of chronic obstructive pulmonary disease (COPD) ([facility]       Plan:   Risks: I discussed all the risks, benefits, alternatives and possible complications or surgery as well as expectations and post-op recovery.       Consent obtained; Cystoscopy botox 200 u in OR today    Teri Hairston M.D, MD  11:52 AM 12/27/2022

## 2022-12-27 NOTE — DISCHARGE INSTRUCTIONS
Ureteral Stent Information  -Ureteral stents are hollow tubes with multiple side holes that coils in your kidney and proceeds down your ureter where it then coils in your bladder                              -Most stents are temporary, if you have a chronic  stent it will need to be exchanged regularly. If left in longer than recommended, serious   complications of severe encrustation, UTI,   and/or obstruction and potential loss of kidney can occur    -Ureteral stents are used to relieve ureteral obstruction and promote ureteral healing following surgery    Why you may have a Stent:  -Ureteral obstruction secondary to kidney stones, ureteral stenosis, ureteral anastomosis, or preventative (prior to ESWL for large stone)    Stent Symptoms  You may not have any symptoms at all   Irritating voiding symptoms; urgency, frequency, feeling of incomplete bladder emptying, burning, blood in urine for up to 1-3 days, straining (all likely due to stent irritating the bladder)  Suprapubic pressure/discomfort  Flank pain    Stent Management  -You will likely be discharged home with:  Pain medication- take as needed for severe pain  Anticholinergic (Oxybutynin, Urispas)- These medications will decrease ureteral and bladder spasms that are likely causing discomfort  Flomax-relaxes ureter  Antibiotic-treats or prevents infection-take medication until completed  *Take all medications as prescribed    *If pain is severe take pain pill, take a hot shower for 10-15 minutes, and then apply heating pad to affected area      -Diet  Normal diet,         Increase water intake!!!! Try to consume at least 2 liters of water a day  AVOID Caffeine-this can make symptoms worse!  -Activity  Avoid strenuous activity!!   Rest when tired  Do not drive if taking narcotic pain medicine  *Call provider if developing symptoms of infection; fever, chills, pus in your urine, new onset body aches    Follow-up is Key part of treatment and safety!!! Cystoscopy stent placement :  You may see blood in the urine after the procedure. This should resolve over the next couple days. Please stay hydrated. You may see intermittent blood in the urine while the stent is in place. This is expected. You may experience flank pain, and/or frequency/urgency of urination while the stent is in place. Pt ok to discharge home in good condition  No heavy lifting, >10 lbs for today  Pt should avoid strenuous activity for today  Pt should walk moderately at home  Pt ok to shower   Pt may resume diet as tolerated  Pt should take Rx as directed  No driving while on narcotics  Please call attending physician or hospital  with questions  Call or Present to ED if fever (> 101F), intractable nausea vomiting or pain.   Rx in chart    Pt should follow up with  [facility], in few weeks, call to confirm appointment

## 2022-12-27 NOTE — PROGRESS NOTES
ADMITTED TO [facility] AND ORIENTED TO UNIT. SCDS ON. FALL BANDS ON. PT VERBALIZED APPROVAL FOR FIRST NAME, LAST INITIAL AND PHYSICIAN NAME ON UNIT WHITEBOARD.

## 2022-12-27 NOTE — ANESTHESIA PRE PROCEDURE
Department of Anesthesiology  Preprocedure Note       Name:  Florian Perez   Age:  74 y.o.  :  1955                                          MRN:  [id number]         Date:  2022      Surgeon: Terrell Goss):  Antolin Alejo MD    Procedure: Procedure(s):  Left ESWL Cystoscopy Bladder Botox 200 units Left Stent Placement    Medications prior to admission:   Prior to Admission medications    Medication Sig Start Date End Date Taking?  Authorizing Provider   potassium chloride (KLOR-CON M) 20 MEQ extended release tablet Take 20 mEq by mouth daily 22   Historical Provider, MD   guaiFENesin (MUCINEX) 600 MG extended release tablet Take 1 tablet by mouth 2 times daily  Patient not taking: Reported on 12/15/2022 11/8/22   BA Velazquez - CNP   ondansetron (ZOFRAN-ODT) 4 MG disintegrating tablet Take 1 tablet by mouth 3 times daily as needed for Nausea or Vomiting 22   Jose Puente DO   omeprazole (PRILOSEC) 40 MG delayed release capsule Take 1 capsule by mouth every morning (before breakfast) 22   Jose Puente DO   fluticasone-umeclidin-vilant (TRELEGY ELLIPTA) 100-62.5-25 MCG/INH AEPB Inhale 1 puff into the lungs daily 3/31/22   Ole Villalobos MD   QUEtiapine (SEROQUEL) 300 MG tablet Take 300 mg by mouth nightly 2 tab    Historical Provider, MD   carvedilol (COREG) 3.125 MG tablet TAKE 1 TABLET BY MOUTH TWICE DAILY 20   Historical Provider, MD   aspirin 81 MG EC tablet Take 1 tablet by mouth daily 21   Khadijah Aragon MD   hydrOXYzine (VISTARIL) 50 MG capsule Take 1 capsule by mouth 3 times daily as needed for Itching or Anxiety 21   Khadijah Aragon MD   sucralfate (CARAFATE) 1 GM tablet Take 1 tablet by mouth 4 times daily 21   Khadijah Aragon MD   clopidogrel (PLAVIX) 75 MG tablet Take 75 mg by mouth daily    Historical Provider, MD   albuterol (PROVENTIL) (2.5 MG/3ML) 0.083% nebulizer solution Take 3 mLs by nebulization every 6 hours as needed for Wheezing 8/26/20   BA Boudreaux CNP   acetaminophen (TYLENOL) 325 MG tablet Take by mouth every 6 hours as needed for Pain 2 tab    Historical Provider, MD   levothyroxine (SYNTHROID) 75 MCG tablet Take 1 tablet by mouth daily everyday except Sunday take 150 mcg. 12/26/19   BA Lazaro CNP   fluticasone (FLONASE) 50 MCG/ACT nasal spray 1 spray by Each Nostril route 2 times daily 12/26/19   BA Lazaro CNP   ferrous sulfate 325 (65 Fe) MG tablet Take 1 tablet by mouth 2 times daily 12/26/19   BA Lazaro CNP   folic acid (FOLVITE) 1 MG tablet Take 1 mg by mouth daily    Historical Provider, MD   ARIPiprazole (ABILIFY) 2 MG tablet Take 2 mg by mouth daily    Historical Provider, MD   atorvastatin (LIPITOR) 40 MG tablet Take 40 mg by mouth nightly     Historical Provider, MD   escitalopram (LEXAPRO) 20 MG tablet Take 30 mg by mouth daily     Historical Provider, MD   fenofibrate 160 MG tablet Take 160 mg by mouth daily     Historical Provider, MD   traZODone (DESYREL) 100 MG tablet Take 2 tablets by mouth nightly 10/31/18   Hannah Clark MD       Current medications:    Current Facility-Administered Medications   Medication Dose Route Frequency Provider Last Rate Last Admin    sodium chloride flush 0.9 % injection 5-40 mL  5-40 mL IntraVENous 2 times per day Nisha Dalton MD        sodium chloride flush 0.9 % injection 5-40 mL  5-40 mL IntraVENous PRN Sami Lanier MD        0.9 % sodium chloride infusion   IntraVENous PRN Nisha Dalton  mL/hr at 12/27/22 1041 New Bag at 12/27/22 1041    ceFAZolin (ANCEF) 2000 mg in dextrose 5 % 50 mL IVPB  2,000 mg IntraVENous On Call to [address] [address], MD        onabotulinumtoxin A (BOTOX) injection 200 Units  200 Units IntraMUSCular Once Nisha Dalton MD        ipratropium-albuterol (DUONEB) nebulizer solution 1 ampule  1 ampule Inhalation Once Nisha Dalton MD           Allergies: Allergies   Allergen Reactions    Seasonal Other (See Comments)     sneezing       Problem List:    Patient Active Problem List   Diagnosis Code    GERD (gastroesophageal reflux disease) K21.9    Colon polyps K63.5    Chest pain, atypical R07.89    Gastroenteritis K52.9    Pneumonia J18.9    Shoulder pain M25.519    History of pulmonary embolism Z86.711    COPD (chronic obstructive pulmonary disease) ([facility] J44.9    Hypoglycemia E16.2    Anticoagulated on Coumadin Z79.01    Bipolar disorder ([facility]) F31.9    Cannabis abuse F12.10    Cocaine abuse ([facility] F14.10    Alcohol dependence in remission ([facility] F10.21    Cholecystitis with cholelithiasis K80.10    GI bleed K92.2    Erosive esophagitis K22.10    Hypokalemia E87.6    HTN (hypertension), benign I10    Bipolar 1 disorder ([facility]) F31.9    Chronic pain G89.29    Hiatal hernia K44.9    Chest pain R07.9    Vomiting R11.10    Erosive gastritis K29.60    H pylori ulcer K27.9, B96.81    Hematemesis K92.0    History of Helicobacter pylori infection Z86.19    Intractable abdominal pain R10.9    Intractable vomiting with nausea R11.2    Epigastric abdominal pain R10.13    Acute blood loss anemia D62    Chronic chest pain R07.9, G89.29    Hyponatremia Y82.2    Metabolic acidosis W89.97    Essential hypertension I10    COPD with acute exacerbation ([facility]) J44.1    Hypothyroidism E03.9    History of DVT (deep vein thrombosis) Z86.718    Depression F32. A    Tobacco abuse Z72.0    Hematemesis with nausea K92.0    Hypoxia R09.02    Pleural effusion, bilateral J90    Suicidal ideation R45.851    Bipolar disorder, in partial remission, most recent episode depressed ([facility]) F31.75    Bipolar II disorder ([facility] Y85.09    Diastolic dysfunction D84.41    Angina, class II ([facility]) I20.9    Unstable angina ([facility]) I20.0    Dyslipidemia E78.5    Electrolyte abnormality E87.8    COPD exacerbation ([facility]) J44.1    Acute respiratory insufficiency R06.89  Chronic diastolic heart failure (HCC) I50.32    Tobacco use disorder F17.200    Nasal septal deviation J34.2    Hypertrophy of left inferior nasal turbinate J34.3    Rhinogenic headache R51.9    Asymmetrical sensorineural hearing loss H90.3    Tinnitus, left ear H93.12    Psychosis (HCC) F29    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 A, R45.851    Amnesia R41.3    Bipolar disorder, current episode mixed, moderate ([facility]) F31.62    Yrlpokkpz-Lxlbtqb-Gelglg disease M22.40    Carpal tunnel syndrome of left wrist G56.02    Change of, skin texture R23.4    Chronic midline low back pain without sciatica M54.50, G89.29    Coronary vasospasm ([facility]) I20.1    Derangement of knee M23.90    Disorder associated with Helicobacter species A19.42    Disturbance of skin sensation R20.9    Encounter for surgical follow-up care Z48.89    Endometriosis N80.9    Environmental and seasonal allergies J30.89    Glaucoma suspect H40.009    History of arterial ischemic stroke Z86.73    Mixed hyperlipidemia E78.2    Large liver R16.0    Lesion of ulnar nerve G56.20    Nasal congestion R09.81    Nicotine dependence F17.200    Pancreatic insufficiency K86.89    Primary osteoarthritis involving multiple joints M15.9    Sciatica M54.30    Sprain of right foot S93.601A    Preoperative state [id number]    Type 2 diabetes mellitus without complication, without long-term current use of insulin ([facility]) E11.9    Urinary incontinence, overflow N39.490    Elevated lactic acid level R79.89    Acute renal failure with acute cortical necrosis ([facility]) N17.1    Pyelonephritis of left kidney N12  Pyelonephritis N12    Septicemia ([address] A41.9    Acute exacerbation of chronic obstructive pulmonary disease (COPD) ([address] J44.1       Past Medical History:        Diagnosis Date    Acute renal failure with acute cortical necrosis (HCC) 12/21/2019    Allergic rhinitis     Arthritis     back, arms, hips    Bipolar 1 disorder ([address]     Blood circulation, collateral     Cancer ([address] 2011    cancerous polyps removed - Dr. Stephanie Suarez Cataract     Colon polyps     COPD (chronic obstructive pulmonary disease) ([address] 07/24/2014    COPD (chronic obstructive pulmonary disease) (HCC)     Coronary vasospasm ([address] 08/17/2019    Depression     Diverticulitis of colon     GERD (gastroesophageal reflux disease)     Glaucoma     Hearing loss     Hiatal hernia     History of arterial ischemic stroke 07/20/2019    History of colonoscopy 2002    History of kidney stones     Hx of blood clots 06/17/2014    PE and collar bone area after shoulder surgery    Hyperlipidemia     Hypertension     Liver disease     enlarged liver - damaged with alcohol in past but no cirrhosis per patient    Pneumonia 07/24/2014    Sexual problems     Suicidal thoughts     2015 admitted to  from [facility]    Thyroid disease     Urinary incontinence     Vomiting     Wears dentures     Wears glasses        Past Surgical History:        Procedure Laterality Date    ABDOMEN SURGERY      x4 removed cysts and ovary removed    CARDIAC SURGERY      heart caths, no stents    CARPAL TUNNEL RELEASE Bilateral 2016    CHOLECYSTECTOMY, LAPAROSCOPIC  6/12/15    Dr. Shannon Fisher    COLONOSCOPY  2011    CYSTOSCOPY N/A 2/10/2022    CYSTOSCOPY URETHRAL IMPLANT INJECTION performed by Juliana Walden MD at [address] N/A 7/13/2020    CYSTOSCOPY WITH BLADDER BIOPSIES performed by Amanda Johnson MD at [address] [place] [address], ESOPHAGUS      EGD     [address] Right 10/7/2004    Dr. Akash Faust Bilateral 2016    decompression    HYSTERECTOMY ([facility])  1998    Dr. Bindu Ty with [facility]    ROTATOR CUFF REPAIR  2004, 2014    right shoulder    SHOULDER SURGERY  2014    right    SKIN BIOPSY  2006    under left eye    STIMULATOR SURGERY N/A 11/4/2020    STAGE 1 INTERSTIM PLACEMENT performed by Amanda Johnson MD at [address] N/A 11/23/2020    PLACEMENT OF STAGE II INTERSTIM performed by Amanda Johnson MD at [address] N/A 4/14/2022    Removal of Interstim performed by Juliana Walden MD at [facility] History:    Social History     Tobacco Use    Smoking status: Every Day     Packs/day: 0.50     Years: 30.00     Pack years: 15.00     Types: Cigarettes     Start date: 4/22/1977    Smokeless tobacco: Never    Tobacco comments:     Trying to quit   Substance Use Topics    Alcohol use: No     Comment: none for 2 years                                Ready to quit: Not Answered  Counseling given: Not Answered  Tobacco comments: Trying to quit      Vital Signs (Current):   Vitals:    12/15/22 1226 12/27/22 1031   BP:  (!) 108/59   Pulse:  63   Resp:  18   Temp:  97.1 °F (36.2 °C)   TempSrc:  Temporal   SpO2:  95%   Weight: 156 lb (70.8 kg) 155 lb (70.3 kg)   Height: 5' 4\" (1.626 m) 5' 4\" (1.626 m)                                              BP Readings from Last 3 Encounters:   12/27/22 (!) 108/59   11/08/22 129/62   09/29/22 138/82       NPO Status: Time of last liquid consumption: 2355                        Time of last solid consumption: 2355                        Date of last liquid consumption: 12/26/22                        Date of last solid food consumption: 12/26/22    BMI:   Wt Readings from Last 3 Encounters:   12/27/22 155 lb (70.3 kg)   11/05/22 161 lb 13.1 oz (73.4 kg)   09/29/22 146 lb (66.2 kg)     Body mass index is 26.61 kg/m².     CBC:   Lab Results   Component Value Date/Time    WBC 12.0 11/08/2022 07:48 AM    RBC 4.31 11/08/2022 07:48 AM    RBC 4.41 04/19/2012 10:25 AM    HGB 12.4 11/08/2022 07:48 AM    HCT 38.6 11/08/2022 07:48 AM    MCV 89.6 11/08/2022 07:48 AM    RDW 14.5 12/12/2019 02:54 PM     11/08/2022 07:48 AM       CMP:   Lab Results   Component Value Date/Time     11/08/2022 07:48 AM    K 4.0 11/08/2022 07:48 AM     11/08/2022 07:48 AM    CO2 26 11/08/2022 07:48 AM    BUN 20 11/08/2022 07:48 AM    CREATININE 0.8 11/08/2022 07:48 AM    GFRAA >60 02/25/2019 08:53 PM    LABGLOM >60 11/08/2022 07:15 AM    GLUCOSE 121 11/08/2022 07:48 AM    GLUCOSE 155 12/12/2019 02:54 PM    PROT 6.7 09/28/2022 07:37 PM    CALCIUM 9.6 11/08/2022 07:48 AM    BILITOT 0.2 09/28/2022 07:37 PM    ALKPHOS 89 09/28/2022 07:37 PM    AST 15 09/28/2022 07:37 PM    ALT 15 09/28/2022 07:37 PM       POC Tests: No results for input(s): POCGLU, POCNA, POCK, POCCL, POCBUN, POCHEMO, POCHCT in the last 72 hours.     Coags:   Lab Results   Component Value Date/Time    PROTIME 14.6 12/12/2019 02:54 PM    INR 1.09 09/28/2022 07:37 PM    APTT 36.9 09/28/2022 07:37 PM       HCG (If Applicable): No results found for: PREGTESTUR, PREGSERUM, HCG, HCGQUANT     ABGs: No results found for: PHART, PO2ART, XFX2QIE, ZOS1ZAY, BEART, J7IYTLIF     Type & Screen (If Applicable):  Lab Results   Component Value Date    LABRH POS 11/17/2017       Drug/Infectious Status (If Applicable):  Lab Results   Component Value Date/Time    HEPCAB Negative 10/12/2015 05:36 AM       COVID-19 Screening (If Applicable):   Lab Results   Component Value Date/Time    COVID19 NOT DETECTED 11/04/2022 04:10 PM           Anesthesia Evaluation  Patient summary reviewed and Nursing notes reviewed no history of anesthetic complications:   Airway: Mallampati: II          Dental:    (+) upper dentures and lower dentures      Pulmonary: breath sounds clear to auscultation  (+) pneumonia:  COPD:                             Cardiovascular:    (+) hypertension:, angina:, CAD:,         Rhythm: regular  Rate: normal                    Neuro/Psych:   (+) neuromuscular disease:, headaches:, psychiatric history:            GI/Hepatic/Renal:   (+) GERD:, PUD, liver disease:,           Endo/Other:    (+) Diabetes, hypothyroidism::., .                 Abdominal:             Vascular: Other Findings:           Anesthesia Plan      MAC     ASA 3       Induction: intravenous. Anesthetic plan and risks discussed with patient. Plan discussed with CRNA.                     [facility],    12/27/2022

## 2022-12-27 NOTE — TELEPHONE ENCOUNTER
Received refill request for Trelegy. Medication was last ordered by [facility]. Medication was last ordered on 03/31/2022 with 5 refills. Patient was last seen in the office 03/31/2022. Patient has a scheduled follow up not currently scheduled, LM for her to call back and schedule follow up    Medication needs to be sent to [facility].

## 2022-12-27 NOTE — TELEPHONE ENCOUNTER
DO NOT TAKE  FISH OIL, MOBIC,COUMADIN, IBUPROFEN, MOTRIN-LIKE DRUGS AND ANY MULTIVITAMINS OR OVER THE COUNTER SUPPLEMENTS 14 DAYS PRIOR TO SURGERY. MUST HAVE AN ADULT OVER THE AGE OF 18 WITH YOU AT THE TIME OF THE DISCHARGE AND WITH YOU AT HOME AFTER THE PROCEDURE FOR 24 HOURS     **HOLD ASPIRIN AND PLAVIX ACCORDING TO CARDIOLOGIST 5 DAYS PRIOR TO PROCEDURE**                 Kevin Alcantara     1955          Diagnosis:      Surgical Physician: Dr. Ana Phan have been scheduled for the procedure marked below:                  Surgery: Left ESWL (Extracorporeal Shock Wave Lithotripsy), Cystoscopy bladder Botox 200 units, Left stent placement                                          Date: 2/28/23                 Anesthesia: Anesthesiologist (General/Spinal)               Place of Service: [facility] Second Floor Same Day Surgery                                                                            Arrive to same day surgery by:  9:30 AM  (Surgery time is subject to change)                             INSTRUCTIONS AS MARKED BELOW:     1.  DO NOT eat or drink anything after midnight before surgery. 2.  We prefer you shower or bathe with an antibacterial soap (Dial) the morning of surgery. 3  Please bring a current medication list, photo ID and insurance card(s) with you  4. Okay to take Tylenol  5. If you take Glucophage, Metformin or Janumet, hold 48-hours prior to surgery  6  Take blood pressure or heart medication as directed, if taken in the morning take with a small sip of water  7. The office will call you in 1-2 days after your procedure to schedule a follow up. DATE SENSITIVE TESTING-DO ON THE DATE LISTED *WALK IN *NO APPOINTMENT     DO PRE OP URINE CULTURE ON 2/14/23. ORDER INCLUDED.

## 2022-12-27 NOTE — ANESTHESIA POSTPROCEDURE EVALUATION
Department of Anesthesiology  Postprocedure Note    Patient: Rah Lynch  MRN: [id number]  YOB: 1955  Date of evaluation: 12/27/2022      Procedure Summary     Date: 12/27/22 Room / Location: [facility]  Davinakema Habermann    Anesthesia Start: 1210 Anesthesia Stop: 6711    Procedure: Cystoscopy Bladder Botox 200 units Left Stent Placement (Bladder) Diagnosis:       Kidney stone      (Kidney stone [N20.0])    Surgeons: Abhishek Combs MD Responsible Provider: Moi Christianson DO    Anesthesia Type: MAC ASA Status: 3          Anesthesia Type: No value filed.     Merline Phase I: Merline Score: 10    Merline Phase II:        Anesthesia Post Evaluation    Patient location during evaluation: PACU  Patient participation: complete - patient participated  Level of consciousness: awake  Airway patency: patent  Nausea & Vomiting: no nausea  Complications: no  Cardiovascular status: hemodynamically stable  Respiratory status: acceptable  Hydration status: stable

## 2022-12-27 NOTE — TELEPHONE ENCOUNTER
SURGERY [address] [place] [place] [address] [facility] [address] [facility], [address]                            Phone *[phone] *[phone]              Surgical Scheduling Direct Line Phone *[phone] Fax *[phone]        Geri Lopez     1955          female     [address] [facility] [address]              Marital Status:                                                      Home Phone: [phone]      Cell Phone:        Telephone Information:   Mobile [phone]                                            Surgeon: Dr. Coby Aiken            Surgery Date: 2/28/ 23         Time: 11:30 AM     Procedure: Left ESWL (Extracorporeal Shock Wave Lithotripsy), left stent placement     Diagnosis: Kidney stone      Important Medical History:  In [facility]     Special Inst/Equip:              Next [facility] Geovanna La confirmation#[id number]     CPT Codes: 85213, 95031  Latex Allergy: No     Cardiac Device:  No     Anesthesia:    General                            Admission Type:  Same Day                        Admit Prior to Day of Surgery: No     Case Location:  Main OR            Preadmission Testing:  Phone Call                     PAT Date and Time:______________________________________________________     PAT Confirmation #: ______________________________________________________     Post Op Visit: ___________________________________________________________     Need Preop Cardiac Clearance: Yes     Does Patient have Cardiologist/physician?      Yenny Neff CNP, already cleared     Surgery Confirmation #: __________________________________________________     : ________________________   Date: __________________________      [employer] Name: [facility] Medicare

## 2022-12-27 NOTE — TELEPHONE ENCOUNTER
Patient is scheduled for surgery with Dr Ramonita Hunt on 2/28/23. Surgery consent on arrival. Patient to do pre op urine culture on 2/14/23. Suregry instructions gone over verbally and mailed to patient. Patient will have an adult over the age of 25 with them at discharge and 24 hours after procedure. Patient is cleared by Dr Arya Velasquez already.

## 2022-12-28 ENCOUNTER — TELEPHONE (OUTPATIENT)
Dept: UROLOGY | Age: 67
End: 2022-12-28

## 2022-12-28 DIAGNOSIS — N20.0 NEPHROLITHIASIS: Primary | ICD-10-CM

## 2022-12-28 DIAGNOSIS — Z01.818 PRE-OP TESTING: ICD-10-CM

## 2022-12-28 LAB — MRSA SCREEN: NORMAL

## 2022-12-28 NOTE — TELEPHONE ENCOUNTER
Patient is scheduled with Dr. Bert Lorenzo on 1/12/23. Surgery consent to be done upon arrival.  Patient cleared by Dr. Nabil Fan on 12/13/22. Patient to do fasting labs and urine culture on 1/3/23; orders in [facility]; patient voiced understanding. Surgery instructions mailed to patient. Patient will have an adult over the age of 25 with them at discharge and 24 hours after procedure.

## 2022-12-28 NOTE — TELEPHONE ENCOUNTER
SURGERY [address] [place] [address] [address] [facility] [address] [facility], [address]      Phone *[phone] *[phone]   Surgical Scheduling Direct Line Phone *[phone] Fax *[phone]      Steve Lopez 1955 female    [address]  [address] [facility] [address]   Marital Status:          Home Phone: [phone]      Cell Phone:    Telephone Information:   Mobile [phone]          Surgeon: Dr. Bruce Paul  Surgery Date: 1/12/23   Time: 11:00am    Procedure: Cystoscopy, left ureteroscopy, laser lithotripsy, basket retrieval of stone fragments, and possible left ureteral stent placement. Diagnosis: kidney stone     Important Medical History:  In [facility]    Special Inst/Equip:     CPT Codes:    86250  Latex Allergy: No     Cardiac Device:  No    Anesthesia:  General          Admission Type:  Same Day                        Admit Prior to Day of Surgery: No    Case Location:  Main OR            Preadmission Testing:  Phone Call          PAT Date and Time:______________________________________________________    PAT Confirmation #: ______________________________________________________    Post Op Visit: ___________________________________________________________    Need Preop Cardiac Clearance: Yes    Does Patient have Cardiologist/physician?      Dr. Bety Mcguire    Surgery Confirmation #: __________________________________________________    [address]: ________________________   Date: __________________________     [employer] Name: [facility]

## 2022-12-28 NOTE — TELEPHONE ENCOUNTER
DO NOT TAKE FISH OIL, IBUPROFEN, MOTRIN-LIKE DRUGS AND ANY MULTIVITAMINS OR OVER THE COUNTER SUPPLEMENTS 14 DAYS PRIOR TO SURGERY. HOLD ASPIRIN AND PLAVIX FOR 5 DAYS PRIOR TO SURGERY. MUST HAVE AN ADULT OVER THE AGE OF 18 WITH YOU AT THE TIME OF THE PROCEDURE AND WITH YOU AT HOME AFTER THE PROCEDURE FOR 24 HOURS       Nedra Perdomo 1955 Diagnosis:     Surgical Physician: Dr. Allyssa Swain have been scheduled for the procedure marked below:      Surgery: Cystoscopy, left ureteroscopy, laser lithotripsy, basket retrieval of stone fragments, and possible left ureteral stent placement. Date: 1/12/2023     Anesthesia: Anesthesiologist (General/Spinal)     Place of Service: [facility] Second Floor Same Day Surgery         Arrive to same day surgery by:  9:00AM  (Surgery time is subject to change)      INSTRUCTIONS AS MARKED BELOW:    1.  DO NOT eat or drink anything after midnight before surgery. 2.  We prefer you shower or bathe with an antibacterial soap (Dial) the morning of surgery. 3  Please bring a current medication list, photo ID and insurance card(s) with you  4. Okay to take Tylenol  5. If you take Glucophage, Metformin or Janumet, hold 48-hours prior to surgery  6. Take blood pressure or heart medication as directed, if taken in the morning take with a small sip of water  7. The office will call you in 1-2 days after your procedure to schedule a follow up.             Date: 12/28/2022

## 2023-01-03 RX ORDER — FLUTICASONE FUROATE, UMECLIDINIUM BROMIDE AND VILANTEROL TRIFENATATE 100; 62.5; 25 UG/1; UG/1; UG/1
POWDER RESPIRATORY (INHALATION)
Qty: 60 EACH | Refills: 0 | Status: SHIPPED | OUTPATIENT
Start: 2023-01-03

## 2023-01-03 NOTE — TELEPHONE ENCOUNTER
Patient missed most recent 6 months follow-up with Betty Landry in November 2022 1 month supply sent to [address] [place] [address] needs to be rescheduled for follow-up for future refills or can contact her PCP for further refills of medication, included Betty Landry on this as she last saw patient in sleep clinic and was a no show for her Pulmonary clinic 11/22/2022, discussed with her in person and updated on situation

## 2023-01-03 NOTE — PROGRESS NOTES
Pt states nothing has new to add to list.  Did go over medication that pt needs to hold before surgery. She said didn't have time to do again.   She is getting labs sometime today

## 2023-01-08 NOTE — OP NOTE
Operative Note      Patient: Florian Perez  YOB: 1955  MRN: [id number]    Date of Procedure: 12/27/2022    Pre-Op Diagnosis: left flank pain, left Kidney stone, OAB with urge incontinence [N20.0]    Post-Op Diagnosis: Same       Procedure(s):  Cystoscopy Bladder Botox 200 units Left Stent Placement    Surgeon(s):  Antolin Alejo MD    Assistant:   * No surgical staff found *    Anesthesia: Monitor Anesthesia Care    Estimated Blood Loss (mL): Minimal    Complications: None    Specimens:   * No specimens in log *    Implants:  Implant Name Type Inv. Item Serial No.  Lot No. LRB No. Used Action   SET URET STNT 6FR L26CM BLACK SACHIN DBL PGTL RADPQ INCL VYN - [id number]  SET URET STNT 6FR L26CM BLACK SACHIN DBL PGTL RADPQ INCL Glorya Ores [id number] Left 1 Implanted         Drains: * No LDAs found *    Findings: left lower pole kidney stone    Patient was scheduled for left ESWL however the machine was nonfunctional for today's scheduled procedure. She continues to complain of left-sided flank pain. I offered her left stent placement. I discussed the risks and benefits and possible complications of this. She elected to proceed with a stent placement to see if it helps the pain. She is also consented for Botox injection at a higher dose of 200 units. Detailed Description of Procedure:        Details of procedure: The patient was brought back to the operating room. They were  laid in the supine position and induced under MAC anesthesia. The genitals were prepped and draped in usual sterile surgical fashion after being placed in dorsal lithotomy position. A timeout was taken per protocol with everyone in agreement. Rigid cystoscope was assembled using a 30 degree lens and passed per the urethra. The urethra was normal without any lesions.  The bladder was entered with ease and inspected thoroughly and systematically which did not show any lesions or tumors, stone, fistulous tracts, diverticula. Both ureteral orifices were normal with clear efflux of urine. At this time we mixed 200 units of botox, and injected the solution into the bladder wall with 0.5 ml increments at each site along the posterior bladder wall. Hemostasis was persistent. The trigone and ureteral orifices were avoided. We then focused our attention on the left ureteral orifice, which we cannulated with our glidewire. Over our glidewire we placed a 6x26 cm double-J ureteral stent and we noted appropriate placement in the upper collecting system using fluoroscopy. We then removed our wire. There was good proximal and distal curl. We did not leave the string at the end of the stent. We then drained the patient's bladder and removed the scope and the procedure was subsequently terminated. Plan:   The patient will be discharged home in good condition.   The patient will need to follow up for definitive stone management ideally with cystoscopy left ureteroscopy, holmium laser lithotripsy and stent exchange with myself or one of my partners in the next few weeks              Electronically signed by Teri Hairston M.D, MD on 1/8/2023 at 6:10 PM

## 2023-01-09 ENCOUNTER — OFFICE VISIT (OUTPATIENT)
Dept: CARDIOLOGY CLINIC | Age: 68
End: 2023-01-09
Payer: MEDICARE

## 2023-01-09 ENCOUNTER — HOSPITAL ENCOUNTER (OUTPATIENT)
Age: 68
Discharge: HOME | End: 2023-01-09
Payer: MEDICARE

## 2023-01-09 VITALS
HEART RATE: 80 BPM | WEIGHT: 157 LBS | HEIGHT: 64 IN | DIASTOLIC BLOOD PRESSURE: 86 MMHG | SYSTOLIC BLOOD PRESSURE: 144 MMHG | BODY MASS INDEX: 26.8 KG/M2

## 2023-01-09 DIAGNOSIS — R01.1 SYSTOLIC MURMUR: Primary | ICD-10-CM

## 2023-01-09 DIAGNOSIS — R07.9 CHEST PAIN, UNSPECIFIED TYPE: ICD-10-CM

## 2023-01-09 DIAGNOSIS — Z01.818 PRE-OP TESTING: ICD-10-CM

## 2023-01-09 DIAGNOSIS — N20.0 NEPHROLITHIASIS: ICD-10-CM

## 2023-01-09 PROCEDURE — 3079F DIAST BP 80-89 MM HG: CPT | Performed by: INTERNAL MEDICINE

## 2023-01-09 PROCEDURE — 99212 OFFICE O/P EST SF 10 MIN: CPT | Performed by: INTERNAL MEDICINE

## 2023-01-09 PROCEDURE — 1123F ACP DISCUSS/DSCN MKR DOCD: CPT | Performed by: INTERNAL MEDICINE

## 2023-01-09 PROCEDURE — G8484 FLU IMMUNIZE NO ADMIN: HCPCS | Performed by: INTERNAL MEDICINE

## 2023-01-09 PROCEDURE — 87077 CULTURE AEROBIC IDENTIFY: CPT

## 2023-01-09 PROCEDURE — 3077F SYST BP >= 140 MM HG: CPT | Performed by: INTERNAL MEDICINE

## 2023-01-09 PROCEDURE — G8427 DOCREV CUR MEDS BY ELIG CLIN: HCPCS | Performed by: INTERNAL MEDICINE

## 2023-01-09 PROCEDURE — 87186 SC STD MICRODIL/AGAR DIL: CPT

## 2023-01-09 PROCEDURE — 87086 URINE CULTURE/COLONY COUNT: CPT

## 2023-01-09 PROCEDURE — 3017F COLORECTAL CA SCREEN DOC REV: CPT | Performed by: INTERNAL MEDICINE

## 2023-01-09 PROCEDURE — 1090F PRES/ABSN URINE INCON ASSESS: CPT | Performed by: INTERNAL MEDICINE

## 2023-01-09 PROCEDURE — G8400 PT W/DXA NO RESULTS DOC: HCPCS | Performed by: INTERNAL MEDICINE

## 2023-01-09 PROCEDURE — G8417 CALC BMI ABV UP PARAM F/U: HCPCS | Performed by: INTERNAL MEDICINE

## 2023-01-09 PROCEDURE — 4004F PT TOBACCO SCREEN RCVD TLK: CPT | Performed by: INTERNAL MEDICINE

## 2023-01-09 RX ORDER — SODIUM CHLORIDE 9 MG/ML
INJECTION, SOLUTION INTRAVENOUS PRN
Status: CANCELLED | OUTPATIENT
Start: 2023-01-09

## 2023-01-09 RX ORDER — SODIUM CHLORIDE 0.9 % (FLUSH) 0.9 %
5-40 SYRINGE (ML) INJECTION EVERY 12 HOURS SCHEDULED
Status: CANCELLED | OUTPATIENT
Start: 2023-01-09

## 2023-01-09 RX ORDER — SODIUM CHLORIDE 0.9 % (FLUSH) 0.9 %
5-40 SYRINGE (ML) INJECTION PRN
Status: CANCELLED | OUTPATIENT
Start: 2023-01-09

## 2023-01-09 NOTE — PROGRESS NOTES
[address] \[facility] [place] [facility]\"" [place] [address]  [place] [address]  Dept: [phone]  Dept Fax: [phone]  Loc: [phone]    Visit Date: 1/9/2023    Ms. Julianne Acosta is a 74 y.o. female  who presented for:  Chief Complaint   Patient presents with    1 Year Follow Up    Check-Up    Congestive Heart Failure       HPI:   HPI   75 yo F RCA 50% on DAPT with OMT, follows up. Needs left sided kidney stone management. Has had intermittent chest discomfort, but this is chronic. Had stress test in the past that was negative with this pain. Medically managed. Some stress as well. Still smoking.         Current Outpatient Medications:     fluticasone-umeclidin-vilant (TRELEGY ELLIPTA) 100-62.5-25 MCG/ACT AEPB inhaler, INHALE 1 PUFF INTO THE LUNGS DAILY, Disp: 60 each, Rfl: 0    phenazopyridine (PYRIDIUM) 200 MG tablet, Take 1 tablet by mouth 3 times daily as needed for Pain, Disp: 9 tablet, Rfl: 0    potassium chloride (KLOR-CON M) 20 MEQ extended release tablet, Take 20 mEq by mouth daily, Disp: , Rfl:     guaiFENesin (MUCINEX) 600 MG extended release tablet, Take 1 tablet by mouth 2 times daily, Disp: 20 tablet, Rfl: 0    ondansetron (ZOFRAN-ODT) 4 MG disintegrating tablet, Take 1 tablet by mouth 3 times daily as needed for Nausea or Vomiting, Disp: 21 tablet, Rfl: 0    omeprazole (PRILOSEC) 40 MG delayed release capsule, Take 1 capsule by mouth every morning (before breakfast), Disp: 30 capsule, Rfl: 0    QUEtiapine (SEROQUEL) 300 MG tablet, Take 300 mg by mouth nightly 2 tab, Disp: , Rfl:     carvedilol (COREG) 3.125 MG tablet, TAKE 1 TABLET BY MOUTH TWICE DAILY, Disp: , Rfl:     aspirin 81 MG EC tablet, Take 1 tablet by mouth daily, Disp: 30 tablet, Rfl: 3    hydrOXYzine (VISTARIL) 50 MG capsule, Take 1 capsule by mouth 3 times daily as needed for Itching or Anxiety, Disp: 90 capsule, Rfl: 0    sucralfate (CARAFATE) 1 GM tablet, Take 1 tablet by mouth 4 times daily, Disp: 120 tablet, Rfl: 3    clopidogrel (PLAVIX) 75 MG tablet, Take 75 mg by mouth daily, Disp: , Rfl:     albuterol (PROVENTIL) (2.5 MG/3ML) 0.083% nebulizer solution, Take 3 mLs by nebulization every 6 hours as needed for Wheezing, Disp: 120 each, Rfl: 0    acetaminophen (TYLENOL) 325 MG tablet, Take by mouth every 6 hours as needed for Pain 2 tab, Disp: , Rfl:     levothyroxine (SYNTHROID) 75 MCG tablet, Take 1 tablet by mouth daily everyday except Sunday take 150 mcg., Disp: 30 tablet, Rfl: 0    fluticasone (FLONASE) 50 MCG/ACT nasal spray, 1 spray by Each Nostril route 2 times daily, Disp: 1 Bottle, Rfl: 0    ferrous sulfate 325 (65 Fe) MG tablet, Take 1 tablet by mouth 2 times daily, Disp: 30 tablet, Rfl: 0    folic acid (FOLVITE) 1 MG tablet, Take 1 mg by mouth daily, Disp: , Rfl:     ARIPiprazole (ABILIFY) 2 MG tablet, Take 2 mg by mouth daily, Disp: , Rfl:     atorvastatin (LIPITOR) 40 MG tablet, Take 40 mg by mouth nightly , Disp: , Rfl:     escitalopram (LEXAPRO) 20 MG tablet, Take 30 mg by mouth daily , Disp: , Rfl:     fenofibrate 160 MG tablet, Take 160 mg by mouth daily , Disp: , Rfl:     traZODone (DESYREL) 100 MG tablet, Take 2 tablets by mouth nightly, Disp: 30 tablet, Rfl: 0    Past Medical History  Elli  has a past medical history of Acute renal failure with acute cortical necrosis (HCC), Allergic rhinitis, Arthritis, Bipolar 1 disorder (HCC), Blood circulation, collateral, Cancer (HCC), Cataract, Colon polyps, COPD (chronic obstructive pulmonary disease) (HCC), COPD (chronic obstructive pulmonary disease) (HCC), Coronary vasospasm (HCC), Depression, Diverticulitis of colon, GERD (gastroesophageal reflux disease), Glaucoma, Hearing loss, Hiatal hernia, History of arterial ischemic stroke, History of colonoscopy, History of kidney stones, Hx of blood clots, Hyperlipidemia, Hypertension, Liver disease, Pneumonia, Sexual problems, Suicidal thoughts, Thyroid disease, Urinary  incontinence, Vomiting, Wears dentures, and Wears glasses. Social History  Harrington Boxer  reports that she has been smoking cigarettes. She started smoking about 45 years ago. She has a 15.00 pack-year smoking history. She has never used smokeless tobacco. She reports that she does not currently use drugs after having used the following drugs: Cocaine. Frequency: 1.00 time per week. She reports that she does not drink alcohol. Family History  Harrington Boxer family history includes Cancer in her father, mother, and sister; Depression in her father; Diabetes in her maternal grandmother; Early Death in her maternal grandfather; Heart Attack in her sister; Heart Disease in her father, maternal grandfather, maternal grandmother, maternal uncle, mother, paternal grandfather, and paternal grandmother; High Blood Pressure in her mother; High Cholesterol in her father, maternal grandfather, maternal grandmother, maternal uncle, mother, paternal grandfather, and paternal grandmother; Mental Illness in her father; Stroke in her maternal grandfather. There is no family history of bicuspid aortic valve, aneurysms, heart transplant, pacemakers, defibrillators, or sudden cardiac death.       Past Surgical History   Past Surgical History:   Procedure Laterality Date    ABDOMEN SURGERY      x4 removed cysts and ovary removed    CARDIAC SURGERY      heart caths, no stents    CARPAL TUNNEL RELEASE Bilateral 2016    CHOLECYSTECTOMY, LAPAROSCOPIC  6/12/15    Dr. Rina Torrez    COLONOSCOPY  2011    CYSTOSCOPY N/A 2/10/2022    CYSTOSCOPY URETHRAL IMPLANT INJECTION performed by Valerie Thurston MD at [address] [facility] N/A 12/27/2022    Cystoscopy Bladder Botox 200 units Left Stent Placement performed by Valerie Thurston MD at [address] N/A 7/13/2020    CYSTOSCOPY WITH BLADDER BIOPSIES performed by Remedios Benitez MD at [facility], ESOPHAGUS      EGD      [facility] Right 10/7/2004    Dr. Kip Zhou ELBOW SURGERY Bilateral 2016    decompression    HYSTERECTOMY ([facility])  1998    Dr. Clara Dorantes with [address] CUFF REPAIR  2004, 2014    right shoulder    SHOULDER SURGERY  2014    right    SKIN BIOPSY  2006    under left eye    STIMULATOR SURGERY N/A 11/4/2020    STAGE 1 INTERSTIM PLACEMENT performed by Jaziel Mclaughlin MD at [facility] 11/23/2020    PLACEMENT OF STAGE II INTERSTIM performed by Jaziel Mclaughlin MD at [facility] [address] N/A 4/14/2022    Removal of Interstim performed by Hollie Howard MD at [address]   Constitutional: Negative for chills and fever  HENT: Negative for congestion, sinus pressure, sneezing and sore throat. Eyes: Negative for pain, discharge, redness and itching. Respiratory: Negative for apnea, cough  Gastrointestinal: Negative for blood in stool, constipation, diarrhea   Endocrine: Negative for cold intolerance, heat intolerance, polydipsia. Genitourinary: Negative for dysuria, enuresis, flank pain and hematuria. Musculoskeletal: Negative for arthralgias, joint swelling and neck pain. Neurological: Negative for numbness and headaches. Psychiatric/Behavioral: Negative for agitation, confusion, decreased concentration and dysphoric mood. Objective:     BP (!) 144/86   Pulse 80   Ht 5' 4\" (1.626 m)   Wt 157 lb (71.2 kg)   BMI 26.95 kg/m²     Wt Readings from Last 3 Encounters:   01/09/23 157 lb (71.2 kg)   12/27/22 155 lb (70.3 kg)   11/05/22 161 lb 13.1 oz (73.4 kg)     BP Readings from Last 3 Encounters:   01/09/23 (!) 144/86   12/27/22 (!) 152/78   11/08/22 129/62       Nursing note and vitals reviewed. Physical Exam   Constitutional: Oriented to person, place, and time. Appears well-developed and well-nourished. HENT:   Head: Normocephalic and atraumatic. Eyes: EOM are normal. Pupils are equal, round, and reactive to light.    Neck: Normal range of motion. Neck supple. No JVD present. Cardiovascular: Normal rate, regular rhythm, normal heart sounds and intact distal pulses. 2/6 ARRON. Pulmonary/Chest: Effort normal and breath sounds normal. No respiratory distress. No wheezes. No rales. Abdominal: Soft. Bowel sounds are normal. No distension. There is no tenderness. Musculoskeletal: Normal range of motion. No edema. Neurological: Alert and oriented to person, place, and time. No cranial nerve deficit. Coordination normal.   Skin: Skin is warm and dry. Psychiatric: Normal mood and affect.        Lab Results   Component Value Date/Time    CKTOTAL 366 07/23/2011 02:00 AM    CKMB 4.2 07/23/2011 02:00 AM       Lab Results   Component Value Date/Time    WBC 12.0 11/08/2022 07:48 AM    RBC 4.31 11/08/2022 07:48 AM    RBC 4.41 04/19/2012 10:25 AM    HGB 12.4 11/08/2022 07:48 AM    HCT 38.6 11/08/2022 07:48 AM    MCV 89.6 11/08/2022 07:48 AM    MCH 28.8 11/08/2022 07:48 AM    MCHC 32.1 11/08/2022 07:48 AM    RDW 14.5 12/12/2019 02:54 PM     11/08/2022 07:48 AM    MPV 9.6 11/08/2022 07:48 AM       Lab Results   Component Value Date/Time     11/08/2022 07:48 AM    K 4.0 11/08/2022 07:48 AM     11/08/2022 07:48 AM    CO2 26 11/08/2022 07:48 AM    BUN 20 11/08/2022 07:48 AM    LABALBU 3.6 09/28/2022 07:37 PM    LABALBU 4.3 04/19/2012 10:25 AM    CREATININE 0.8 11/08/2022 07:48 AM    CALCIUM 9.6 11/08/2022 07:48 AM    GFRAA >60 02/25/2019 08:53 PM    LABGLOM >60 11/08/2022 07:15 AM    GLUCOSE 121 11/08/2022 07:48 AM    GLUCOSE 155 12/12/2019 02:54 PM       Lab Results   Component Value Date/Time    ALKPHOS 89 09/28/2022 07:37 PM    ALT 15 09/28/2022 07:37 PM    AST 15 09/28/2022 07:37 PM    PROT 6.7 09/28/2022 07:37 PM    BILITOT 0.2 09/28/2022 07:37 PM    BILIDIR <0.2 07/27/2022 10:37 PM    LABALBU 3.6 09/28/2022 07:37 PM    LABALBU 4.3 04/19/2012 10:25 AM       Lab Results   Component Value Date/Time    MG 1.8 07/27/2022 10:37 PM Lab Results   Component Value Date    INR 1.09 09/28/2022    INR 1.12 04/26/2022    INR 0.99 04/14/2022    PROTIME 14.6 (H) 12/12/2019         Lab Results   Component Value Date/Time    LABA1C 6.5 11/05/2022 05:26 AM       Lab Results   Component Value Date/Time    TRIG 208 01/11/2022 01:02 PM    HDL 36 09/26/2022 10:54 AM    LDLCALC 88 09/26/2022 10:54 AM    LDLDIRECT 137 02/25/2019 08:53 PM       Lab Results   Component Value Date/Time    TSH 5.630 09/26/2022 10:54 AM         Testing Reviewed:      I have individually reviewed the cardiac test below:    ECHO: No results found for this or any previous visit. Assessment/Plan   Atypical chest pain - chronic  Sister with recent CABG  50% RCA hx  Bipolar disorder  EF 60%  3/6 ARRON - LVOTo - 5 m/s  Increase fluid intake, monitor symptoms, watch for anesthesia induction and avoid significant hypotension. Will continue current meds. Needs to stop smoking. She held Plavix, and will d/c at this point no PCI or CVA. Discussed diet/exercise/BP/weight loss/health lifestyle choices/lipids; the patient understands the goals and will try to comply.     Disposition:  1 year           Electronically signed by Sabrina Baum MD   1/9/2023 at 2:20 PM EST

## 2023-01-10 LAB
ORGANISM: ABNORMAL
URINE CULTURE, ROUTINE: ABNORMAL

## 2023-01-11 ENCOUNTER — PREP FOR PROCEDURE (OUTPATIENT)
Dept: UROLOGY | Age: 68
End: 2023-01-11

## 2023-01-11 ENCOUNTER — TELEPHONE (OUTPATIENT)
Dept: UROLOGY | Age: 68
End: 2023-01-11

## 2023-01-11 RX ORDER — AMOXICILLIN AND CLAVULANATE POTASSIUM 500; 125 MG/1; MG/1
1 TABLET, FILM COATED ORAL 3 TIMES DAILY
Qty: 30 TABLET | Refills: 0 | Status: ON HOLD | OUTPATIENT
Start: 2023-01-11 | End: 2023-01-21

## 2023-01-11 NOTE — TELEPHONE ENCOUNTER
Patient's sensitivities came back now. Please review urine culture on 1/9/23. Surgery with Dr Jimy Hunt on 1/12/23 for a Cystoscopy Bladder Botox 200 units Left Stent Placement Thanks.

## 2023-01-11 NOTE — PROGRESS NOTES
Sent note to [employer] with Dr Vinny Tavera in regards to urine sensitivity is back. Also asking if treating urine?

## 2023-01-12 ENCOUNTER — ANESTHESIA EVENT (OUTPATIENT)
Dept: OPERATING ROOM | Age: 68
End: 2023-01-12
Payer: MEDICARE

## 2023-01-12 ENCOUNTER — ANESTHESIA (OUTPATIENT)
Dept: OPERATING ROOM | Age: 68
End: 2023-01-12
Payer: MEDICARE

## 2023-01-12 ENCOUNTER — HOSPITAL ENCOUNTER (OUTPATIENT)
Age: 68
Setting detail: OUTPATIENT SURGERY
Discharge: HOME | End: 2023-01-12
Attending: UROLOGY | Admitting: UROLOGY
Payer: MEDICARE

## 2023-01-12 VITALS
DIASTOLIC BLOOD PRESSURE: 67 MMHG | HEIGHT: 64 IN | RESPIRATION RATE: 16 BRPM | OXYGEN SATURATION: 94 % | BODY MASS INDEX: 26.12 KG/M2 | HEART RATE: 79 BPM | SYSTOLIC BLOOD PRESSURE: 138 MMHG | TEMPERATURE: 97 F | WEIGHT: 153 LBS

## 2023-01-12 DIAGNOSIS — R10.9 FLANK PAIN: Primary | ICD-10-CM

## 2023-01-12 LAB — INR BLD: 1.03 (ref 0.85–1.13)

## 2023-01-12 PROCEDURE — 7100000001 HC PACU RECOVERY - ADDTL 15 MIN: Performed by: UROLOGY

## 2023-01-12 PROCEDURE — C1758 CATHETER, URETERAL: HCPCS | Performed by: UROLOGY

## 2023-01-12 PROCEDURE — 2720000010 HC SURG SUPPLY STERILE: Performed by: UROLOGY

## 2023-01-12 PROCEDURE — C1747 HC ENDOSCOPE, SINGLE, URINARY TRACT: HCPCS | Performed by: UROLOGY

## 2023-01-12 PROCEDURE — C1769 GUIDE WIRE: HCPCS | Performed by: UROLOGY

## 2023-01-12 PROCEDURE — C2617 STENT, NON-COR, TEM W/O DEL: HCPCS | Performed by: UROLOGY

## 2023-01-12 PROCEDURE — 3600000003 HC SURGERY LEVEL 3 BASE: Performed by: UROLOGY

## 2023-01-12 PROCEDURE — 7100000011 HC PHASE II RECOVERY - ADDTL 15 MIN: Performed by: UROLOGY

## 2023-01-12 PROCEDURE — 3700000000 HC ANESTHESIA ATTENDED CARE: Performed by: UROLOGY

## 2023-01-12 PROCEDURE — 6360000002 HC RX W HCPCS: Performed by: NURSE ANESTHETIST, CERTIFIED REGISTERED

## 2023-01-12 PROCEDURE — 36415 COLL VENOUS BLD VENIPUNCTURE: CPT

## 2023-01-12 PROCEDURE — 3600000013 HC SURGERY LEVEL 3 ADDTL 15MIN: Performed by: UROLOGY

## 2023-01-12 PROCEDURE — 6360000002 HC RX W HCPCS: Performed by: UROLOGY

## 2023-01-12 PROCEDURE — 2709999900 HC NON-CHARGEABLE SUPPLY: Performed by: UROLOGY

## 2023-01-12 PROCEDURE — 7100000010 HC PHASE II RECOVERY - FIRST 15 MIN: Performed by: UROLOGY

## 2023-01-12 PROCEDURE — 6370000000 HC RX 637 (ALT 250 FOR IP): Performed by: UROLOGY

## 2023-01-12 PROCEDURE — 7100000000 HC PACU RECOVERY - FIRST 15 MIN: Performed by: UROLOGY

## 2023-01-12 PROCEDURE — 3700000001 HC ADD 15 MINUTES (ANESTHESIA): Performed by: UROLOGY

## 2023-01-12 PROCEDURE — 85610 PROTHROMBIN TIME: CPT

## 2023-01-12 PROCEDURE — 2580000003 HC RX 258: Performed by: NURSE ANESTHETIST, CERTIFIED REGISTERED

## 2023-01-12 PROCEDURE — 2580000003 HC RX 258: Performed by: UROLOGY

## 2023-01-12 PROCEDURE — 6360000002 HC RX W HCPCS: Performed by: ANESTHESIOLOGY

## 2023-01-12 DEVICE — BLACK SILICONE FILIFORM DOUBLE PIGTAIL URETERAL STENT SET
Type: IMPLANTABLE DEVICE | Status: FUNCTIONAL
Brand: COOK

## 2023-01-12 RX ORDER — FENTANYL CITRATE 50 UG/ML
INJECTION, SOLUTION INTRAMUSCULAR; INTRAVENOUS
Status: DISCONTINUED
Start: 2023-01-12 | End: 2023-01-12 | Stop reason: HOSPADM

## 2023-01-12 RX ORDER — DEXAMETHASONE SODIUM PHOSPHATE 10 MG/ML
INJECTION, EMULSION INTRAMUSCULAR; INTRAVENOUS PRN
Status: DISCONTINUED | OUTPATIENT
Start: 2023-01-12 | End: 2023-01-12 | Stop reason: SDUPTHER

## 2023-01-12 RX ORDER — SODIUM CHLORIDE 0.9 % (FLUSH) 0.9 %
5-40 SYRINGE (ML) INJECTION PRN
Status: DISCONTINUED | OUTPATIENT
Start: 2023-01-12 | End: 2023-01-12 | Stop reason: HOSPADM

## 2023-01-12 RX ORDER — FENTANYL CITRATE 50 UG/ML
50 INJECTION, SOLUTION INTRAMUSCULAR; INTRAVENOUS EVERY 5 MIN PRN
Status: COMPLETED | OUTPATIENT
Start: 2023-01-12 | End: 2023-01-12

## 2023-01-12 RX ORDER — SODIUM CHLORIDE 9 MG/ML
INJECTION, SOLUTION INTRAVENOUS PRN
Status: DISCONTINUED | OUTPATIENT
Start: 2023-01-12 | End: 2023-01-12 | Stop reason: HOSPADM

## 2023-01-12 RX ORDER — FENTANYL CITRATE 50 UG/ML
INJECTION, SOLUTION INTRAMUSCULAR; INTRAVENOUS PRN
Status: DISCONTINUED | OUTPATIENT
Start: 2023-01-12 | End: 2023-01-12 | Stop reason: SDUPTHER

## 2023-01-12 RX ORDER — ONDANSETRON 2 MG/ML
INJECTION INTRAMUSCULAR; INTRAVENOUS PRN
Status: DISCONTINUED | OUTPATIENT
Start: 2023-01-12 | End: 2023-01-12 | Stop reason: SDUPTHER

## 2023-01-12 RX ORDER — PHENYLEPHRINE HYDROCHLORIDE 10 MG/ML
INJECTION INTRAVENOUS PRN
Status: DISCONTINUED | OUTPATIENT
Start: 2023-01-12 | End: 2023-01-12 | Stop reason: SDUPTHER

## 2023-01-12 RX ORDER — PROPOFOL 10 MG/ML
INJECTION, EMULSION INTRAVENOUS PRN
Status: DISCONTINUED | OUTPATIENT
Start: 2023-01-12 | End: 2023-01-12 | Stop reason: SDUPTHER

## 2023-01-12 RX ORDER — PHENAZOPYRIDINE HYDROCHLORIDE 200 MG/1
200 TABLET, FILM COATED ORAL 3 TIMES DAILY PRN
Qty: 9 TABLET | Refills: 0 | Status: ON HOLD | OUTPATIENT
Start: 2023-01-12 | End: 2023-01-17 | Stop reason: HOSPADM

## 2023-01-12 RX ORDER — KETOROLAC TROMETHAMINE 30 MG/ML
30 INJECTION, SOLUTION INTRAMUSCULAR; INTRAVENOUS ONCE
Status: COMPLETED | OUTPATIENT
Start: 2023-01-12 | End: 2023-01-12

## 2023-01-12 RX ORDER — KETOROLAC TROMETHAMINE 30 MG/ML
INJECTION, SOLUTION INTRAMUSCULAR; INTRAVENOUS
Status: DISCONTINUED
Start: 2023-01-12 | End: 2023-01-12 | Stop reason: HOSPADM

## 2023-01-12 RX ORDER — OXYCODONE HYDROCHLORIDE AND ACETAMINOPHEN 5; 325 MG/1; MG/1
1 TABLET ORAL ONCE
Status: COMPLETED | OUTPATIENT
Start: 2023-01-12 | End: 2023-01-12

## 2023-01-12 RX ORDER — SUCCINYLCHOLINE CHLORIDE 20 MG/ML
INJECTION INTRAMUSCULAR; INTRAVENOUS PRN
Status: DISCONTINUED | OUTPATIENT
Start: 2023-01-12 | End: 2023-01-12 | Stop reason: SDUPTHER

## 2023-01-12 RX ORDER — SODIUM CHLORIDE 0.9 % (FLUSH) 0.9 %
5-40 SYRINGE (ML) INJECTION EVERY 12 HOURS SCHEDULED
Status: DISCONTINUED | OUTPATIENT
Start: 2023-01-12 | End: 2023-01-12 | Stop reason: HOSPADM

## 2023-01-12 RX ORDER — SODIUM CHLORIDE 9 MG/ML
INJECTION, SOLUTION INTRAVENOUS CONTINUOUS PRN
Status: DISCONTINUED | OUTPATIENT
Start: 2023-01-12 | End: 2023-01-12 | Stop reason: SDUPTHER

## 2023-01-12 RX ORDER — OXYCODONE HYDROCHLORIDE AND ACETAMINOPHEN 5; 325 MG/1; MG/1
1 TABLET ORAL EVERY 6 HOURS PRN
Qty: 12 TABLET | Refills: 0 | Status: ON HOLD | OUTPATIENT
Start: 2023-01-12 | End: 2023-01-17 | Stop reason: HOSPADM

## 2023-01-12 RX ORDER — CIPROFLOXACIN 500 MG/1
500 TABLET, FILM COATED ORAL 2 TIMES DAILY
Qty: 14 TABLET | Refills: 0 | Status: SHIPPED | OUTPATIENT
Start: 2023-01-12

## 2023-01-12 RX ADMIN — FENTANYL CITRATE 50 MCG: 50 INJECTION, SOLUTION INTRAMUSCULAR; INTRAVENOUS at 13:00

## 2023-01-12 RX ADMIN — OXYCODONE HYDROCHLORIDE AND ACETAMINOPHEN 1 TABLET: 5; 325 TABLET ORAL at 13:10

## 2023-01-12 RX ADMIN — FENTANYL CITRATE 50 MCG: 50 INJECTION, SOLUTION INTRAMUSCULAR; INTRAVENOUS at 12:50

## 2023-01-12 RX ADMIN — HYDROMORPHONE HYDROCHLORIDE 1 MG: 1 INJECTION, SOLUTION INTRAMUSCULAR; INTRAVENOUS; SUBCUTANEOUS at 14:52

## 2023-01-12 RX ADMIN — DEXAMETHASONE SODIUM PHOSPHATE 10 MG: 10 INJECTION, EMULSION INTRAMUSCULAR; INTRAVENOUS at 12:07

## 2023-01-12 RX ADMIN — SODIUM CHLORIDE: 9 INJECTION, SOLUTION INTRAVENOUS at 12:01

## 2023-01-12 RX ADMIN — PROPOFOL 140 MG: 10 INJECTION, EMULSION INTRAVENOUS at 12:04

## 2023-01-12 RX ADMIN — FENTANYL CITRATE 100 MCG: 50 INJECTION, SOLUTION INTRAMUSCULAR; INTRAVENOUS at 12:04

## 2023-01-12 RX ADMIN — KETOROLAC TROMETHAMINE 30 MG: 30 INJECTION, SOLUTION INTRAMUSCULAR; INTRAVENOUS at 13:05

## 2023-01-12 RX ADMIN — CEFAZOLIN 2000 MG: 10 INJECTION, POWDER, FOR SOLUTION INTRAVENOUS at 12:09

## 2023-01-12 RX ADMIN — SODIUM CHLORIDE: 9 INJECTION, SOLUTION INTRAVENOUS at 10:15

## 2023-01-12 RX ADMIN — FENTANYL CITRATE 50 MCG: 50 INJECTION, SOLUTION INTRAMUSCULAR; INTRAVENOUS at 13:15

## 2023-01-12 RX ADMIN — ONDANSETRON 4 MG: 2 INJECTION INTRAMUSCULAR; INTRAVENOUS at 12:07

## 2023-01-12 RX ADMIN — Medication 100 MG: at 12:04

## 2023-01-12 RX ADMIN — FENTANYL CITRATE 50 MCG: 50 INJECTION, SOLUTION INTRAMUSCULAR; INTRAVENOUS at 12:55

## 2023-01-12 RX ADMIN — PHENYLEPHRINE HYDROCHLORIDE 100 MCG: 10 INJECTION INTRAVENOUS at 12:19

## 2023-01-12 RX ADMIN — Medication 20 MG: at 12:29

## 2023-01-12 ASSESSMENT — PAIN SCALES - GENERAL
PAINLEVEL_OUTOF10: 0
PAINLEVEL_OUTOF10: 8
PAINLEVEL_OUTOF10: 9
PAINLEVEL_OUTOF10: 8
PAINLEVEL_OUTOF10: 7
PAINLEVEL_OUTOF10: 10
PAINLEVEL_OUTOF10: 9
PAINLEVEL_OUTOF10: 10
PAINLEVEL_OUTOF10: 10

## 2023-01-12 ASSESSMENT — PAIN DESCRIPTION - DESCRIPTORS: DESCRIPTORS: ACHING

## 2023-01-12 ASSESSMENT — PAIN - FUNCTIONAL ASSESSMENT: PAIN_FUNCTIONAL_ASSESSMENT: NONE - DENIES PAIN

## 2023-01-12 ASSESSMENT — PAIN DESCRIPTION - ORIENTATION
ORIENTATION: LEFT
ORIENTATION: LEFT

## 2023-01-12 ASSESSMENT — PAIN DESCRIPTION - LOCATION
LOCATION: FLANK
LOCATION: FLANK

## 2023-01-12 ASSESSMENT — PAIN DESCRIPTION - PAIN TYPE: TYPE: SURGICAL PAIN

## 2023-01-12 NOTE — ANESTHESIA POSTPROCEDURE EVALUATION
Department of Anesthesiology  Postprocedure Note    Patient: Geri Lopez  MRN: [id number]  YOB: 1955  Date of evaluation: 1/12/2023      Procedure Summary     Date: 01/12/23 [facility] [place]    Anesthesia Start: 3679 Anesthesia Stop: 1250    Procedure: Cystoscopy Left Ureteroscopy Laser Lithotripsy Basket Retrieval of Stone Fragments possible Left Ureteral Stent (Left) Diagnosis:       Kidney stone      (Kidney stone [N20.0])    Surgeons: Rudi Vargas MD Responsible Provider: Dylan Bryant MD    Anesthesia Type: general ASA Status: 3          Anesthesia Type: No value filed.     Merline Phase I: Merline Score: 9    Merline Phase II: Merline Score: 10      Anesthesia Post Evaluation    Complications: no

## 2023-01-12 NOTE — H&P
History and Physical    Patient:  Crissy Wheeler  MRN: [id number]  YOB: 1955    CHIEF COMPLAINT:  left kidney stone    HISTORY OF PRESENT ILLNESS:   The patient is a 74 y.o. female who presents with as above, here for surgery    Patient's old records, notes and chart reviewed and summarized above.      Past Medical History:    Past Medical History:   Diagnosis Date    Acute renal failure with acute cortical necrosis ([address] 12/21/2019    Allergic rhinitis     Arthritis     back, arms, hips    Bipolar 1 disorder ([address]     Blood circulation, collateral     Cancer ([address] 2011    cancerous polyps removed - Dr. Hollie Brown    Cataract     Colon polyps     COPD (chronic obstructive pulmonary disease) ([address] 07/24/2014    COPD (chronic obstructive pulmonary disease) ([address]     Coronary vasospasm ([address] 08/17/2019    Depression     Diverticulitis of colon     GERD (gastroesophageal reflux disease)     Glaucoma     Hearing loss     Hiatal hernia     History of arterial ischemic stroke 07/20/2019    History of colonoscopy 2002    History of kidney stones     Hx of blood clots 06/17/2014    PE and collar bone area after shoulder surgery    Hyperlipidemia     Hypertension     Liver disease     enlarged liver - damaged with alcohol in past but no cirrhosis per patient    Pneumonia 07/24/2014    Sexual problems     Suicidal thoughts     2015 admitted to [facility]    Thyroid disease     Urinary incontinence     Vomiting     Wears dentures     Wears glasses        Past Surgical History:    Past Surgical History:   Procedure Laterality Date    ABDOMEN SURGERY      x4 removed cysts and ovary removed    CARDIAC SURGERY      heart caths, no stents    CARPAL TUNNEL RELEASE Bilateral 2016    CHOLECYSTECTOMY, LAPAROSCOPIC  6/12/15    Dr. Montalvo Pac    COLONOSCOPY  2011    CYSTOSCOPY N/A 2/10/2022    CYSTOSCOPY URETHRAL IMPLANT INJECTION performed by Zeferino Cotter MD at [facility] N/A 12/27/2022    Cystoscopy Bladder Botox 200 units Left Stent Placement performed by Sparkle Jensen MD at [address] N/A 7/13/2020    CYSTOSCOPY WITH BLADDER BIOPSIES performed by Rosalba Gatica MD at [facility], ESOPHAGUS      EGD      [place] Right 10/7/2004    Dr. Hickman Napoleon Bilateral 2016    decompression    HYSTERECTOMY ([address])  1998    Dr. Radha Haney with [address] CUFF REPAIR  2004, 2014    right shoulder    SHOULDER SURGERY  2014    right    SKIN BIOPSY  2006    under left eye    STIMULATOR SURGERY N/A 11/4/2020    STAGE 1 INTERSTIM PLACEMENT performed by Rosalba Gatica MD at [facility] 11/23/2020    PLACEMENT OF STAGE II INTERSTIM performed by Rosalba Gatica MD at [facility] N/A 4/14/2022    Removal of Interstim performed by Sparkle Jensen MD at [facility]     Medications Prior to Admission:    Prior to Admission medications    Medication Sig Start Date End Date Taking?  Authorizing Provider   amoxicillin-clavulanate (AUGMENTIN) 500-125 MG per tablet Take 1 tablet by mouth 3 times daily for 10 days 1/11/23 1/21/23  BA Lopez - KESHAV   fluticasone-umeclidin-vilant (TRELEGY ELLIPTA) 100-62.5-25 MCG/ACT AEPB inhaler INHALE 1 PUFF INTO THE LUNGS DAILY 1/3/23   BA Christianson - CNP   phenazopyridine (PYRIDIUM) 200 MG tablet Take 1 tablet by mouth 3 times daily as needed for Pain 12/27/22   Sparkle Jensen MD   potassium chloride (KLOR-CON M) 20 MEQ extended release tablet Take 20 mEq by mouth daily 12/5/22   Historical Provider, MD   guaiFENesin (MUCINEX) 600 MG extended release tablet Take 1 tablet by mouth 2 times daily 11/8/22   BA Iraheta - CNP   ondansetron (ZOFRAN-ODT) 4 MG disintegrating tablet Take 1 tablet by mouth 3 times daily as needed for Nausea or Vomiting 7/28/22   Brody Hogan DO   omeprazole (PRILOSEC) 40 MG delayed release capsule Take 1 capsule by mouth every morning (before breakfast) 4/26/22   Denece Child, DO   QUEtiapine (SEROQUEL) 300 MG tablet Take 300 mg by mouth nightly 2 tab    Historical Provider, MD   carvedilol (COREG) 3.125 MG tablet TAKE 1 TABLET BY MOUTH TWICE DAILY 7/1/20   Historical Provider, MD   aspirin 81 MG EC tablet Take 1 tablet by mouth daily 2/22/21   Guru Hare MD   hydrOXYzine (VISTARIL) 50 MG capsule Take 1 capsule by mouth 3 times daily as needed for Itching or Anxiety 2/22/21   Guru Hare MD   sucralfate (CARAFATE) 1 GM tablet Take 1 tablet by mouth 4 times daily 2/22/21   Guru Hare MD   albuterol (PROVENTIL) (2.5 MG/3ML) 0.083% nebulizer solution Take 3 mLs by nebulization every 6 hours as needed for Wheezing 8/26/20   Darrold JuliaBA CNP   acetaminophen (TYLENOL) 325 MG tablet Take by mouth every 6 hours as needed for Pain 2 tab    Historical Provider, MD   levothyroxine (SYNTHROID) 75 MCG tablet Take 1 tablet by mouth daily everyday except Sunday take 150 mcg. 12/26/19   BA Andersen CNP   fluticasone (FLONASE) 50 MCG/ACT nasal spray 1 spray by Each Nostril route 2 times daily 12/26/19   BA Andersen CNP   ferrous sulfate 325 (65 Fe) MG tablet Take 1 tablet by mouth 2 times daily 12/26/19   BA Andersen CNP   folic acid (FOLVITE) 1 MG tablet Take 1 mg by mouth daily    Historical Provider, MD   ARIPiprazole (ABILIFY) 2 MG tablet Take 2 mg by mouth daily    Historical Provider, MD   atorvastatin (LIPITOR) 40 MG tablet Take 40 mg by mouth nightly     Historical Provider, MD   escitalopram (LEXAPRO) 20 MG tablet Take 30 mg by mouth daily     Historical Provider, MD   fenofibrate 160 MG tablet Take 160 mg by mouth daily     Historical Provider, MD   traZODone (DESYREL) 100 MG tablet Take 2 tablets by mouth nightly 10/31/18   Rosio Cardenas MD       Allergies:  Seasonal    Social History:    Social History     Socioeconomic History Marital status:      Spouse name: Not on file    Number of children: 3    Years of education: 15    Highest education level: Not on file   Occupational History    Occupation: on disability     Employer: [employer]   Tobacco Use    Smoking status: Every Day     Packs/day: 0.50     Years: 30.00     Pack years: 15.00     Types: Cigarettes     Start date: 4/22/1977    Smokeless tobacco: Never    Tobacco comments:     Trying to quit   Vaping Use    Vaping Use: Never used   Substance and Sexual Activity    Alcohol use: No     Comment: none for 2 years    Drug use: Not Currently     Frequency: 1.0 times per week     Types: Cocaine    Sexual activity: Not Currently   Other Topics Concern    Not on file   Social History Narrative    Not on file     Social Determinants of Health     Financial Resource Strain: Not on file   Food Insecurity: Not on file   Transportation Needs: Not on file   Physical Activity: Not on file   Stress: Not on file   Social Connections: Not on file   Intimate Partner Violence: Not on file   Housing Stability: Not on file       Family History:    Family History   Problem Relation Age of Onset    Cancer Mother     Heart Disease Mother     High Blood Pressure Mother     High Cholesterol Mother     Cancer Father         brain    Depression Father     Heart Disease Father     High Cholesterol Father     Mental Illness Father     Cancer Sister     Heart Attack Sister     Heart Disease Maternal Uncle     High Cholesterol Maternal Uncle     Diabetes Maternal Grandmother     Heart Disease Maternal Grandmother     High Cholesterol Maternal Grandmother     Early Death Maternal Grandfather     Heart Disease Maternal Grandfather     High Cholesterol Maternal Grandfather     Stroke Maternal Grandfather     Heart Disease Paternal Grandmother     High Cholesterol Paternal Grandmother     Heart Disease Paternal Grandfather     High Cholesterol Paternal Grandfather     Colon Cancer Neg Hx Inflam Bowel Dis Neg Hx        REVIEW OF SYSTEMS:  Constitutional: negative  Eyes: negative  Respiratory: negative  Cardiovascular: negative  Gastrointestinal: negative  Genitourinary: see HPI  Musculoskeletal: negative  Skin: negative   Neurological: negative  Hematological/Lymphatic: negative  Psychological: negative    Physical Exam:      Patient Vitals for the past 24 hrs:   BP Temp Temp src Pulse Resp SpO2 Height Weight   01/12/23 0945 -- -- -- -- -- -- 5' 4\" (1.626 m) 153 lb (69.4 kg)   01/12/23 0941 (!) 145/69 (!) 96.3 °F (35.7 °C) Temporal 65 16 95 % -- --     Constitutional: Patient in no acute distress; Neuro: alert and oriented to person place and time. Psych: Mood and affect normal.  Skin: Normal  Lungs: Respiratory effort normal, CTA  Cardiovascular:  Normal peripheral pulses; no murmur  Abdomen: Soft, non-tender, non-distended with no CVA, flank pain, hepatosplenomegaly or hernia. Kidneys normal.  Bladder non-tender and not distended. LABS:   No results for input(s): WBC, HGB, HCT, MCV, PLT in the last 72 hours. No results for input(s): NA, K, CL, CO2, PHOS, BUN, CREATININE, CA in the last 72 hours. No results found for: PSA        Urinalysis: No results for input(s): COLORU, PHUR, LABCAST, WBCUA, RBCUA, MUCUS, TRICHOMONAS, YEAST, BACTERIA, CLARITYU, SPECGRAV, LEUKOCYTESUR, UROBILINOGEN, Ezzie Casandra in the last 72 hours.     Invalid input(s): NITRATE, GLUCOSEUKETONESUAMORPHOUS     -----------------------------------------------------------------      Assessment and Plan   Impression:    Patient Active Problem List   Diagnosis    GERD (gastroesophageal reflux disease)    Colon polyps    Chest pain, atypical    Gastroenteritis    Pneumonia    Shoulder pain    History of pulmonary embolism    COPD (chronic obstructive pulmonary disease) (HCC)    Hypoglycemia    Anticoagulated on Coumadin    Bipolar disorder ([facility]    Cannabis abuse    Cocaine abuse ([facility]    Alcohol dependence in remission ([address]    Cholecystitis with cholelithiasis    GI bleed    Erosive esophagitis    Hypokalemia    HTN (hypertension), benign    Bipolar 1 disorder (HCC)    Chronic pain    Hiatal hernia    Chest pain    Vomiting    Erosive gastritis    H pylori ulcer    Hematemesis    History of Helicobacter pylori infection    Intractable abdominal pain    Intractable vomiting with nausea    Epigastric abdominal pain    Acute blood loss anemia    Chronic chest pain    Hyponatremia    Metabolic acidosis    Essential hypertension    COPD with acute exacerbation (HCC)    Hypothyroidism    History of DVT (deep vein thrombosis)    Depression    Tobacco abuse    Hematemesis with nausea    Hypoxia    Pleural effusion, bilateral    Suicidal ideation    Bipolar disorder, in partial remission, most recent episode depressed ([address]    Bipolar II disorder ([address]    Diastolic dysfunction    Angina, class II (HCC)    Unstable angina (HCC)    Dyslipidemia    Electrolyte abnormality    COPD exacerbation (HCC)    Acute respiratory insufficiency    Chronic diastolic heart failure ([facility])    Tobacco use disorder    Nasal septal deviation    Hypertrophy of left inferior nasal turbinate    Rhinogenic headache    Asymmetrical sensorineural hearing loss    Tinnitus, left ear    Psychosis (HCC)    Substance-induced psychotic disorder ([address]    Bipolar 1 disorder, depressed ([address]    Polysubstance abuse ([address]    Major depressive disorder, recurrent (HCC)    Stroke-like symptom    Right arm weakness    Delirium    Paresthesias    Depression, major, recurrent ([address]    Depression with suicidal ideation    Amnesia    Bipolar disorder, current episode mixed, moderate (HCC)    Ibowabirp-Kwqridh-Mlmwni disease    Carpal tunnel syndrome of left wrist    Change of, skin texture    Chronic midline low back pain without sciatica    Coronary vasospasm (HCC)    Derangement of knee    Disorder associated with Helicobacter species    Disturbance of skin sensation Encounter for surgical follow-up care    Endometriosis    Environmental and seasonal allergies    Glaucoma suspect    History of arterial ischemic stroke    Mixed hyperlipidemia    Large liver    Lesion of ulnar nerve    Nasal congestion    Nicotine dependence    Pancreatic insufficiency    Primary osteoarthritis involving multiple joints    Sciatica    Sprain of right foot    Preoperative state    Type 2 diabetes mellitus without complication, without long-term current use of insulin (HCC)    Urinary incontinence, overflow    Elevated lactic acid level    Acute renal failure with acute cortical necrosis (HCC)    Pyelonephritis of left kidney    Pyelonephritis    Septicemia (HCC)    Acute exacerbation of chronic obstructive pulmonary disease (COPD) ([facility]       Plan:   Risks: I discussed all the risks, benefits, alternatives and possible complications or surgery as well as expectations and post-op recovery.       Consent obtained; Cystoscopy Left Ureteroscopy Laser Lithotripsy Basket Retrieval of Stone Fragments possible Left Ureteral Stent in OR today    Radha Sutton M.D, MD  11:44 AM 1/12/2023

## 2023-01-12 NOTE — DISCHARGE INSTRUCTIONS
Ureteral Stent Information  -Ureteral stents are hollow tubes with multiple side holes that coils in your kidney and proceeds down your ureter where it then coils in your bladder                              -Most stents are temporary, if you have a chronic  stent it will need to be exchanged regularly. If left in longer than recommended, serious   complications of severe encrustation, UTI,   and/or obstruction and potential loss of kidney can occur    -Ureteral stents are used to relieve ureteral obstruction and promote ureteral healing following surgery    Why you may have a Stent:  -Ureteral obstruction secondary to kidney stones, ureteral stenosis, ureteral anastomosis, or preventative (prior to ESWL for large stone)    Stent Symptoms  You may not have any symptoms at all   Irritating voiding symptoms; urgency, frequency, feeling of incomplete bladder emptying, burning, blood in urine for up to 1-3 days, straining (all likely due to stent irritating the bladder)  Suprapubic pressure/discomfort  Flank pain    Stent Management  -You will likely be discharged home with:  Pain medication- take as needed for severe pain  Anticholinergic (Oxybutynin, Urispas)- These medications will decrease ureteral and bladder spasms that are likely causing discomfort  Flomax-relaxes ureter  Antibiotic-treats or prevents infection-take medication until completed  *Take all medications as prescribed    *If pain is severe take pain pill, take a hot shower for 10-15 minutes, and then apply heating pad to affected area      -Diet  Normal diet,         Increase water intake!!!! Try to consume at least 2 liters of water a day  AVOID Caffeine-this can make symptoms worse!  -Activity  Avoid strenuous activity!!   Rest when tired  Do not drive if taking narcotic pain medicine  *Call provider if developing symptoms of infection; fever, chills, pus in your urine, new onset body aches    Follow-up is Key part of treatment and safety!!! Cystoscopy stent placement with string: You may see blood in the urine after the procedure. This should resolve over the next couple days. Please stay hydrated. You may see intermittent blood in the urine while the stent is in place. This is expected. You may experience flank pain, and/or frequency/urgency of urination while the stent is in place. Pt ok to discharge home in good condition  No heavy lifting, >10 lbs for today  Pt should avoid strenuous activity for today  Pt should walk moderately at home  Pt ok to shower   Pt may resume diet as tolerated  Pt should take Rx as directed  No driving while on narcotics  Please call attending physician or hospital  with questions  Call or Present to ED if fever (> 101F), intractable nausea vomiting or pain. Rx sent to pharmacy    Pt should follow up with Dr. Bruna Gray, in 6-8 weeks, call to confirm appointment    Pt should Pull stent in 5 days. There may be some pain associated with the stent removal, which is usually self limiting. We suggest using the pain medication prescribed for you and a nonsteroidal anti-inflammatory such as Ibuprofen, if you are able to take this medication, to control symptoms. Take Ibuprofen as directed for 24 hrs after stent pull. Please stay hydrated. Please call with questions.

## 2023-01-12 NOTE — ANESTHESIA PRE PROCEDURE
Department of Anesthesiology  Preprocedure Note       Name:  Ayden Waite   Age:  74 y.o.  :  1955                                          MRN:  [id number]         Date:  2023      Surgeon: Parker Decker):  Belkis Ashton MD    Procedure: Procedure(s):  Cystoscopy Left Ureteroscopy Laser Lithotripsy Basket Retrieval of Stone Fragments possible Left Ureteral Stent    Medications prior to admission:   Prior to Admission medications    Medication Sig Start Date End Date Taking?  Authorizing Provider   amoxicillin-clavulanate (AUGMENTIN) 500-125 MG per tablet Take 1 tablet by mouth 3 times daily for 10 days 23  Severiano Rice APRN - CNP   fluticasone-umeclidin-vilant (TRELEGY ELLIPTA) 100-62.5-25 MCG/ACT AEPB inhaler INHALE 1 PUFF INTO THE LUNGS DAILY 1/3/23   [place], APRN - CNP   phenazopyridine (PYRIDIUM) 200 MG tablet Take 1 tablet by mouth 3 times daily as needed for Pain 22   Belkis Ashton MD   potassium chloride (KLOR-CON M) 20 MEQ extended release tablet Take 20 mEq by mouth daily 22   Historical Provider, MD   guaiFENesin (MUCINEX) 600 MG extended release tablet Take 1 tablet by mouth 2 times daily 22   BA Cortez - CNP   ondansetron (ZOFRAN-ODT) 4 MG disintegrating tablet Take 1 tablet by mouth 3 times daily as needed for Nausea or Vomiting 22   Renetta Hubbard DO   omeprazole (PRILOSEC) 40 MG delayed release capsule Take 1 capsule by mouth every morning (before breakfast) 22   Renetta Hubbard DO   QUEtiapine (SEROQUEL) 300 MG tablet Take 300 mg by mouth nightly 2 tab    Historical Provider, MD   carvedilol (COREG) 3.125 MG tablet TAKE 1 TABLET BY MOUTH TWICE DAILY 20   Historical Provider, MD   aspirin 81 MG EC tablet Take 1 tablet by mouth daily 21   José Miguel Carolina MD   hydrOXYzine (VISTARIL) 50 MG capsule Take 1 capsule by mouth 3 times daily as needed for Itching or Anxiety 21   José Miguel Carolina MD   sucralfate (CARAFATE) 1 GM tablet Take 1 tablet by mouth 4 times daily 2/22/21   Allan Walters MD   albuterol (PROVENTIL) (2.5 MG/3ML) 0.083% nebulizer solution Take 3 mLs by nebulization every 6 hours as needed for Wheezing 8/26/20   BA Mir CNP   acetaminophen (TYLENOL) 325 MG tablet Take by mouth every 6 hours as needed for Pain 2 tab    Historical Provider, MD   levothyroxine (SYNTHROID) 75 MCG tablet Take 1 tablet by mouth daily everyday except Sunday take 150 mcg. 12/26/19   BA Persaud CNP   fluticasone (FLONASE) 50 MCG/ACT nasal spray 1 spray by Each Nostril route 2 times daily 12/26/19   BA Persaud CNP   ferrous sulfate 325 (65 Fe) MG tablet Take 1 tablet by mouth 2 times daily 12/26/19   BA Persaud CNP   folic acid (FOLVITE) 1 MG tablet Take 1 mg by mouth daily    Historical Provider, MD   ARIPiprazole (ABILIFY) 2 MG tablet Take 2 mg by mouth daily    Historical Provider, MD   atorvastatin (LIPITOR) 40 MG tablet Take 40 mg by mouth nightly     Historical Provider, MD   escitalopram (LEXAPRO) 20 MG tablet Take 30 mg by mouth daily     Historical Provider, MD   fenofibrate 160 MG tablet Take 160 mg by mouth daily     Historical Provider, MD   traZODone (DESYREL) 100 MG tablet Take 2 tablets by mouth nightly 10/31/18   Ari Kim MD       Current medications:    Current Facility-Administered Medications   Medication Dose Route Frequency Provider Last Rate Last Admin    sodium chloride flush 0.9 % injection 5-40 mL  5-40 mL IntraVENous 2 times per day Shaquille Painting MD        sodium chloride flush 0.9 % injection 5-40 mL  5-40 mL IntraVENous PRN Sami Lanier MD        0.9 % sodium chloride infusion   IntraVENous PRN Shaquille Painting  mL/hr at 01/12/23 1015 New Bag at 01/12/23 1015    ceFAZolin (ANCEF) 2000 mg in dextrose 5 % 50 mL IVPB  2,000 mg IntraVENous On Call to [id number] [address], [place]           Allergies:     Allergies   Allergen Reactions    Seasonal Other (See Comments)     sneezing       Problem List:    Patient Active Problem List   Diagnosis Code    GERD (gastroesophageal reflux disease) K21.9    Colon polyps K63.5    Chest pain, atypical R07.89    Gastroenteritis K52.9    Pneumonia J18.9    Shoulder pain M25.519    History of pulmonary embolism Z86.711    COPD (chronic obstructive pulmonary disease) ([facility]) J44.9    Hypoglycemia E16.2    Anticoagulated on Coumadin Z79.01    Bipolar disorder ([facility]) F31.9    Cannabis abuse F12.10    Cocaine abuse ([facility] F14.10    Alcohol dependence in remission ([facility] F10.21    Cholecystitis with cholelithiasis K80.10    GI bleed K92.2    Erosive esophagitis K22.10    Hypokalemia E87.6    HTN (hypertension), benign I10    Bipolar 1 disorder ([facility]) F31.9    Chronic pain G89.29    Hiatal hernia K44.9    Chest pain R07.9    Vomiting R11.10    Erosive gastritis K29.60    H pylori ulcer K27.9, B96.81    Hematemesis K92.0    History of Helicobacter pylori infection Z86.19    Intractable abdominal pain R10.9    Intractable vomiting with nausea R11.2    Epigastric abdominal pain R10.13    Acute blood loss anemia D62    Chronic chest pain R07.9, G89.29    Hyponatremia M74.3    Metabolic acidosis K04.77    Essential hypertension I10    COPD with acute exacerbation ([facility]) J44.1    Hypothyroidism E03.9    History of DVT (deep vein thrombosis) Z86.718    Depression F32. A    Tobacco abuse Z72.0    Hematemesis with nausea K92.0    Hypoxia R09.02    Pleural effusion, bilateral J90    Suicidal ideation R45.851    Bipolar disorder, in partial remission, most recent episode depressed ([facility]) F31.75    Bipolar II disorder ([facility] S61.14    Diastolic dysfunction Z17.44    Angina, class II ([facility]) I20.9    Unstable angina ([facility]) I20.0    Dyslipidemia E78.5    Electrolyte abnormality E87.8    COPD exacerbation ([facility]) J44.1    Acute respiratory insufficiency R06.89    Chronic diastolic heart failure ([facility]) I50.32    Tobacco use disorder F17.200    Nasal septal deviation J34.2    Hypertrophy of left inferior nasal turbinate J34.3    Rhinogenic headache R51.9    Asymmetrical sensorineural hearing loss H90.3    Tinnitus, left ear H93.12    Psychosis (HCC) F29    Substance-induced psychotic disorder ([facility]) F19.959    Bipolar 1 disorder, depressed ([facility]) F31.9    Polysubstance abuse ([address] F19.10    Major depressive disorder, recurrent ([facility]) F33.9    Stroke-like symptom R29.90    Right arm weakness R29.898    Delirium R41.0    Paresthesias R20.2    Depression, major, recurrent ([facility]) F33.9    Depression with suicidal ideation F32. A, R45.851    Amnesia R41.3    Bipolar disorder, current episode mixed, moderate ([facility]) F31.62    Nalxjjdgy-Kntvaxu-Yvalny disease M22.40    Carpal tunnel syndrome of left wrist G56.02    Change of, skin texture R23.4    Chronic midline low back pain without sciatica M54.50, G89.29    Coronary vasospasm ([facility]) I20.1    Derangement of knee M23.90    Disorder associated with Helicobacter species C64.99    Disturbance of skin sensation R20.9    Encounter for surgical follow-up care Z48.89    Endometriosis N80.9    Environmental and seasonal allergies J30.89    Glaucoma suspect H40.009    History of arterial ischemic stroke Z86.73    Mixed hyperlipidemia E78.2    Large liver R16.0    Lesion of ulnar nerve G56.20    Nasal congestion R09.81    Nicotine dependence F17.200    Pancreatic insufficiency K86.89    Primary osteoarthritis involving multiple joints M15.9    Sciatica M54.30    Sprain of right foot S93.601A    Preoperative state [id number]    Type 2 diabetes mellitus without complication, without long-term current use of insulin ([facility]) E11.9    Urinary incontinence, overflow N39.490    Elevated lactic acid level R79.89    Acute renal failure with acute cortical necrosis ([facility]) N17.1    Pyelonephritis of left kidney N12    Pyelonephritis N12  Septicemia ([address] A41.9    Acute exacerbation of chronic obstructive pulmonary disease (COPD) ([address] J44.1       Past Medical History:        Diagnosis Date    Acute renal failure with acute cortical necrosis (HCC) 12/21/2019    Allergic rhinitis     Arthritis     back, arms, hips    Bipolar 1 disorder ([address]     Blood circulation, collateral     Cancer ([address] 2011    cancerous polyps removed - Dr. Rosa Mcconnell Cataract     Colon polyps     COPD (chronic obstructive pulmonary disease) ([address] 07/24/2014    COPD (chronic obstructive pulmonary disease) (HCC)     Coronary vasospasm ([address] 08/17/2019    Depression     Diverticulitis of colon     GERD (gastroesophageal reflux disease)     Glaucoma     Hearing loss     Hiatal hernia     History of arterial ischemic stroke 07/20/2019    History of colonoscopy 2002    History of kidney stones     Hx of blood clots 06/17/2014    PE and collar bone area after shoulder surgery    Hyperlipidemia     Hypertension     Liver disease     enlarged liver - damaged with alcohol in past but no cirrhosis per patient    Pneumonia 07/24/2014    Sexual problems     Suicidal thoughts     2015 [facility]    Thyroid disease     Urinary incontinence     Vomiting     Wears dentures     Wears glasses        Past Surgical History:        Procedure Laterality Date    ABDOMEN SURGERY      x4 removed cysts and ovary removed    CARDIAC SURGERY      heart caths, no stents    CARPAL TUNNEL RELEASE Bilateral 2016    CHOLECYSTECTOMY, LAPAROSCOPIC  6/12/15    Dr. Arpita Broussard    COLONOSCOPY  2011    CYSTOSCOPY N/A 2/10/2022    CYSTOSCOPY URETHRAL IMPLANT INJECTION performed by Abhishek Combs MD at [facility] 12/27/2022    Cystoscopy Bladder Botox 200 units Left Stent Placement performed by Abhishek Combs MD at [address] N/A 7/13/2020    CYSTOSCOPY WITH BLADDER BIOPSIES performed by Guillermina Melara MD at [facility]    DILATATION, ESOPHAGUS      EGD      ELBOW SURGERY Right 10/7/2004    Dr. Enzo Dawkins Bilateral 2016    decompression    HYSTERECTOMY ([facility])  1998    Dr. Moi Guerrero with [address] CUFF REPAIR  2004, 2014    right shoulder    SHOULDER SURGERY  2014    right    SKIN BIOPSY  2006    under left eye    STIMULATOR SURGERY N/A 11/4/2020    STAGE 1 INTERSTIM PLACEMENT performed by Jordin Lockett MD at [facility] 11/23/2020    PLACEMENT OF STAGE II INTERSTIM performed by Jordin Lockett MD at [address] N/A 4/14/2022    Removal of Interstim performed by Luz Elena Bentley MD at [facility] History:    Social History     Tobacco Use    Smoking status: Every Day     Packs/day: 0.50     Years: 30.00     Pack years: 15.00     Types: Cigarettes     Start date: 4/22/1977    Smokeless tobacco: Never    Tobacco comments:     Trying to quit   Substance Use Topics    Alcohol use: No     Comment: none for 2 years                                Ready to quit: Not Answered  Counseling given: Not Answered  Tobacco comments: Trying to quit      Vital Signs (Current):   Vitals:    01/12/23 0941 01/12/23 0945   BP: (!) 145/69    Pulse: 65    Resp: 16    Temp: (!) 96.3 °F (35.7 °C)    TempSrc: Temporal    SpO2: 95%    Weight:  153 lb (69.4 kg)   Height:  5' 4\" (1.626 m)                                              BP Readings from Last 3 Encounters:   01/12/23 (!) 145/69   01/09/23 (!) 144/86   12/27/22 (!) 152/78       NPO Status: Time of last liquid consumption: 2345                        Time of last solid consumption: 2345                        Date of last liquid consumption: 01/11/23                        Date of last solid food consumption: 01/11/23    BMI:   Wt Readings from Last 3 Encounters:   01/12/23 153 lb (69.4 kg)   01/09/23 157 lb (71.2 kg)   12/27/22 155 lb (70.3 kg)     Body mass index is 26.26 kg/m². CBC:   Lab Results   Component Value Date/Time    WBC 12.0 11/08/2022 07:48 AM    RBC 4.31 11/08/2022 07:48 AM    RBC 4.41 04/19/2012 10:25 AM    HGB 12.4 11/08/2022 07:48 AM    HCT 38.6 11/08/2022 07:48 AM    MCV 89.6 11/08/2022 07:48 AM    RDW 14.5 12/12/2019 02:54 PM     11/08/2022 07:48 AM       CMP:   Lab Results   Component Value Date/Time     11/08/2022 07:48 AM    K 4.0 11/08/2022 07:48 AM     11/08/2022 07:48 AM    CO2 26 11/08/2022 07:48 AM    BUN 20 11/08/2022 07:48 AM    CREATININE 0.8 11/08/2022 07:48 AM    GFRAA >60 02/25/2019 08:53 PM    LABGLOM >60 11/08/2022 07:15 AM    GLUCOSE 121 11/08/2022 07:48 AM    GLUCOSE 155 12/12/2019 02:54 PM    PROT 6.7 09/28/2022 07:37 PM    CALCIUM 9.6 11/08/2022 07:48 AM    BILITOT 0.2 09/28/2022 07:37 PM    ALKPHOS 89 09/28/2022 07:37 PM    AST 15 09/28/2022 07:37 PM    ALT 15 09/28/2022 07:37 PM       POC Tests: No results for input(s): POCGLU, POCNA, POCK, POCCL, POCBUN, POCHEMO, POCHCT in the last 72 hours.     Coags:   Lab Results   Component Value Date/Time    PROTIME 14.6 12/12/2019 02:54 PM    INR 1.03 01/12/2023 09:56 AM    APTT 36.9 09/28/2022 07:37 PM       HCG (If Applicable): No results found for: PREGTESTUR, PREGSERUM, HCG, HCGQUANT     ABGs: No results found for: PHART, PO2ART, CDI0AYB, MMO5XPF, BEART, D5CGUIQD     Type & Screen (If Applicable):  Lab Results   Component Value Date    LABRH POS 11/17/2017       Drug/Infectious Status (If Applicable):  Lab Results   Component Value Date/Time    HEPCAB Negative 10/12/2015 05:36 AM       COVID-19 Screening (If Applicable):   Lab Results   Component Value Date/Time    COVID19 NOT DETECTED 11/04/2022 04:10 PM           Anesthesia Evaluation    Airway: Mallampati: II          Dental:          Pulmonary:   (+) COPD:                             Cardiovascular:    (+) hypertension:, angina:, CAD:,       ECG reviewed      Echocardiogram reviewed Neuro/Psych:   (+) neuromuscular disease:, headaches:, psychiatric history:            GI/Hepatic/Renal:   (+) hiatal hernia, GERD:, PUD, liver disease:,           Endo/Other:    (+) Diabetes, hypothyroidism::., .                 Abdominal:             Vascular: Other Findings:           Anesthesia Plan      general     ASA 3       Induction: intravenous. Anesthetic plan and risks discussed with patient.                         Isaías Leon MD   1/12/2023

## 2023-01-12 NOTE — PROGRESS NOTES
1248- pt to pacu, resp easy and unlabored, VSS, pt states she is having pain in left side, pt appears in no acute distress  1250- fentanyl given for pain  1255- fentanyl given for pain, pt tolerating  1300- fentanyl given for pain  1302- Dr. [facility] approached due to increased pain in left side, orders received   1305- Toradol given for pain  1310- percocet tablet given for pain, pt tolerating, pt states pain is not improving  1315- fentanyl given for pain  1325- pt with occasional oxygen desaturations, pt able to independently recover oxygen saturation with little encouragement from pacu nurse  1335- pt remains in stable condition, pt states pain is beginning to improve, pt has kept oxygen levels stable, resp easy and unlabored, VSS, pt appears in no acute distress  1340- pt meets criteria for discharge from pacu, pt transported to [facility]

## 2023-01-15 ENCOUNTER — HOSPITAL ENCOUNTER (INPATIENT)
Age: 68
LOS: 2 days | Discharge: HOME | DRG: 699 | End: 2023-01-17
Attending: EMERGENCY MEDICINE | Admitting: UROLOGY
Payer: MEDICARE

## 2023-01-15 ENCOUNTER — APPOINTMENT (OUTPATIENT)
Dept: CT IMAGING | Age: 68
DRG: 699 | End: 2023-01-15
Payer: MEDICARE

## 2023-01-15 DIAGNOSIS — S37.012A HEMATOMA OF LEFT KIDNEY, INITIAL ENCOUNTER: Primary | ICD-10-CM

## 2023-01-15 DIAGNOSIS — G89.18 POST-OPERATIVE PAIN: ICD-10-CM

## 2023-01-15 LAB
ALBUMIN SERPL-MCNC: 3.8 G/DL (ref 3.5–5.1)
ALP BLD-CCNC: 81 U/L (ref 38–126)
ALT SERPL-CCNC: 15 U/L (ref 11–66)
ANION GAP SERPL CALCULATED.3IONS-SCNC: 13 MEQ/L (ref 8–16)
AST SERPL-CCNC: 23 U/L (ref 5–40)
BACTERIA: ABNORMAL
BASOPHILS # BLD: 0.6 %
BASOPHILS ABSOLUTE: 0 THOU/MM3 (ref 0–0.1)
BILIRUB SERPL-MCNC: 0.5 MG/DL (ref 0.3–1.2)
BILIRUBIN DIRECT: < 0.2 MG/DL (ref 0–0.3)
BILIRUBIN URINE: NEGATIVE
BLOOD, URINE: ABNORMAL
BUN BLDV-MCNC: 12 MG/DL (ref 7–22)
CALCIUM SERPL-MCNC: 9.7 MG/DL (ref 8.5–10.5)
CASTS: ABNORMAL /LPF
CASTS: ABNORMAL /LPF
CHARACTER, URINE: ABNORMAL
CHLORIDE BLD-SCNC: 100 MEQ/L (ref 98–111)
CO2: 21 MEQ/L (ref 23–33)
COLOR: YELLOW
CREAT SERPL-MCNC: 0.7 MG/DL (ref 0.4–1.2)
CRYSTALS: ABNORMAL
EOSINOPHIL # BLD: 2.4 %
EOSINOPHILS ABSOLUTE: 0.2 THOU/MM3 (ref 0–0.4)
EPITHELIAL CELLS, UA: ABNORMAL /HPF
ERYTHROCYTE [DISTWIDTH] IN BLOOD BY AUTOMATED COUNT: 13.2 % (ref 11.5–14.5)
ERYTHROCYTE [DISTWIDTH] IN BLOOD BY AUTOMATED COUNT: 42.5 FL (ref 35–45)
GFR SERPL CREATININE-BSD FRML MDRD: > 60 ML/MIN/1.73M2
GLUCOSE BLD-MCNC: 110 MG/DL (ref 70–108)
GLUCOSE, URINE: NEGATIVE MG/DL
HCT VFR BLD CALC: 33.8 % (ref 37–47)
HCT VFR BLD CALC: 37.5 % (ref 37–47)
HCT VFR BLD CALC: 38.7 % (ref 37–47)
HEMOGLOBIN: 11.1 GM/DL (ref 12–16)
HEMOGLOBIN: 12.3 GM/DL (ref 12–16)
HEMOGLOBIN: 12.9 GM/DL (ref 12–16)
IMMATURE GRANS (ABS): 0.03 THOU/MM3 (ref 0–0.07)
IMMATURE GRANULOCYTES: 0.4 %
KETONES, URINE: NEGATIVE
LEUKOCYTE ESTERASE, URINE: ABNORMAL
LIPASE: 32.2 U/L (ref 5.6–51.3)
LYMPHOCYTES # BLD: 27.9 %
LYMPHOCYTES ABSOLUTE: 2 THOU/MM3 (ref 1–4.8)
MCH RBC QN AUTO: 29.1 PG (ref 26–33)
MCHC RBC AUTO-ENTMCNC: 33.3 GM/DL (ref 32.2–35.5)
MCV RBC AUTO: 87.4 FL (ref 81–99)
MISCELLANEOUS LAB TEST RESULT: ABNORMAL
MONOCYTES # BLD: 10.2 %
MONOCYTES ABSOLUTE: 0.7 THOU/MM3 (ref 0.4–1.3)
NITRITE, URINE: NEGATIVE
NUCLEATED RED BLOOD CELLS: 0 /100 WBC
OSMOLALITY CALCULATION: 268.6 MOSMOL/KG (ref 275–300)
PH UA: 6.5 (ref 5–9)
PLATELET # BLD: 252 THOU/MM3 (ref 130–400)
PMV BLD AUTO: 9.9 FL (ref 9.4–12.4)
POTASSIUM SERPL-SCNC: 4 MEQ/L (ref 3.5–5.2)
PROTEIN UA: 100 MG/DL
RBC # BLD: 4.43 MILL/MM3 (ref 4.2–5.4)
RBC URINE: ABNORMAL /HPF
RENAL EPITHELIAL, UA: ABNORMAL
SEG NEUTROPHILS: 58.5 %
SEGMENTED NEUTROPHILS ABSOLUTE COUNT: 4.2 THOU/MM3 (ref 1.8–7.7)
SODIUM BLD-SCNC: 134 MEQ/L (ref 135–145)
SPECIFIC GRAVITY UA: 1.01 (ref 1–1.03)
TOTAL PROTEIN: 7 G/DL (ref 6.1–8)
UROBILINOGEN, URINE: 1 EU/DL (ref 0–1)
WBC # BLD: 7.1 THOU/MM3 (ref 4.8–10.8)
WBC UA: ABNORMAL /HPF
YEAST: ABNORMAL

## 2023-01-15 PROCEDURE — 6360000002 HC RX W HCPCS: Performed by: EMERGENCY MEDICINE

## 2023-01-15 PROCEDURE — 6370000000 HC RX 637 (ALT 250 FOR IP): Performed by: STUDENT IN AN ORGANIZED HEALTH CARE EDUCATION/TRAINING PROGRAM

## 2023-01-15 PROCEDURE — 36415 COLL VENOUS BLD VENIPUNCTURE: CPT

## 2023-01-15 PROCEDURE — 99285 EMERGENCY DEPT VISIT HI MDM: CPT

## 2023-01-15 PROCEDURE — 85014 HEMATOCRIT: CPT

## 2023-01-15 PROCEDURE — 2580000003 HC RX 258: Performed by: STUDENT IN AN ORGANIZED HEALTH CARE EDUCATION/TRAINING PROGRAM

## 2023-01-15 PROCEDURE — 96375 TX/PRO/DX INJ NEW DRUG ADDON: CPT

## 2023-01-15 PROCEDURE — 83690 ASSAY OF LIPASE: CPT

## 2023-01-15 PROCEDURE — 81001 URINALYSIS AUTO W/SCOPE: CPT

## 2023-01-15 PROCEDURE — 96374 THER/PROPH/DIAG INJ IV PUSH: CPT

## 2023-01-15 PROCEDURE — 6360000002 HC RX W HCPCS

## 2023-01-15 PROCEDURE — 82248 BILIRUBIN DIRECT: CPT

## 2023-01-15 PROCEDURE — 2580000003 HC RX 258

## 2023-01-15 PROCEDURE — 6360000002 HC RX W HCPCS: Performed by: STUDENT IN AN ORGANIZED HEALTH CARE EDUCATION/TRAINING PROGRAM

## 2023-01-15 PROCEDURE — 85018 HEMOGLOBIN: CPT

## 2023-01-15 PROCEDURE — 1200000000 HC SEMI PRIVATE

## 2023-01-15 PROCEDURE — 74176 CT ABD & PELVIS W/O CONTRAST: CPT

## 2023-01-15 PROCEDURE — 6370000000 HC RX 637 (ALT 250 FOR IP)

## 2023-01-15 PROCEDURE — 96361 HYDRATE IV INFUSION ADD-ON: CPT

## 2023-01-15 PROCEDURE — 85025 COMPLETE CBC W/AUTO DIFF WBC: CPT

## 2023-01-15 PROCEDURE — 80053 COMPREHEN METABOLIC PANEL: CPT

## 2023-01-15 RX ORDER — OXYCODONE HYDROCHLORIDE AND ACETAMINOPHEN 5; 325 MG/1; MG/1
1 TABLET ORAL EVERY 4 HOURS PRN
Status: DISCONTINUED | OUTPATIENT
Start: 2023-01-15 | End: 2023-01-17

## 2023-01-15 RX ORDER — MORPHINE SULFATE 4 MG/ML
4 INJECTION, SOLUTION INTRAMUSCULAR; INTRAVENOUS ONCE
Status: COMPLETED | OUTPATIENT
Start: 2023-01-15 | End: 2023-01-15

## 2023-01-15 RX ORDER — LIDOCAINE 4 G/G
1 PATCH TOPICAL DAILY
Status: DISCONTINUED | OUTPATIENT
Start: 2023-01-15 | End: 2023-01-17 | Stop reason: HOSPADM

## 2023-01-15 RX ORDER — SODIUM CHLORIDE 9 MG/ML
INJECTION, SOLUTION INTRAVENOUS CONTINUOUS
Status: DISCONTINUED | OUTPATIENT
Start: 2023-01-15 | End: 2023-01-17

## 2023-01-15 RX ORDER — HYDROCODONE BITARTRATE AND ACETAMINOPHEN 5; 325 MG/1; MG/1
1 TABLET ORAL ONCE
Status: DISCONTINUED | OUTPATIENT
Start: 2023-01-15 | End: 2023-01-15

## 2023-01-15 RX ORDER — KETOROLAC TROMETHAMINE 30 MG/ML
15 INJECTION, SOLUTION INTRAMUSCULAR; INTRAVENOUS ONCE
Status: COMPLETED | OUTPATIENT
Start: 2023-01-15 | End: 2023-01-15

## 2023-01-15 RX ORDER — SODIUM CHLORIDE 0.9 % (FLUSH) 0.9 %
5-40 SYRINGE (ML) INJECTION 2 TIMES DAILY
Status: DISCONTINUED | OUTPATIENT
Start: 2023-01-15 | End: 2023-01-17 | Stop reason: HOSPADM

## 2023-01-15 RX ORDER — 0.9 % SODIUM CHLORIDE 0.9 %
1000 INTRAVENOUS SOLUTION INTRAVENOUS ONCE
Status: COMPLETED | OUTPATIENT
Start: 2023-01-15 | End: 2023-01-15

## 2023-01-15 RX ORDER — PANTOPRAZOLE SODIUM 40 MG/1
40 TABLET, DELAYED RELEASE ORAL
Status: DISCONTINUED | OUTPATIENT
Start: 2023-01-15 | End: 2023-01-17 | Stop reason: HOSPADM

## 2023-01-15 RX ORDER — ONDANSETRON 2 MG/ML
4 INJECTION INTRAMUSCULAR; INTRAVENOUS ONCE
Status: COMPLETED | OUTPATIENT
Start: 2023-01-15 | End: 2023-01-15

## 2023-01-15 RX ADMIN — OXYCODONE AND ACETAMINOPHEN 1 TABLET: 5; 325 TABLET ORAL at 08:12

## 2023-01-15 RX ADMIN — HYDROMORPHONE HYDROCHLORIDE 1 MG: 1 INJECTION, SOLUTION INTRAMUSCULAR; INTRAVENOUS; SUBCUTANEOUS at 21:28

## 2023-01-15 RX ADMIN — SODIUM CHLORIDE: 9 INJECTION, SOLUTION INTRAVENOUS at 21:28

## 2023-01-15 RX ADMIN — HYDROMORPHONE HYDROCHLORIDE 1 MG: 1 INJECTION, SOLUTION INTRAMUSCULAR; INTRAVENOUS; SUBCUTANEOUS at 03:30

## 2023-01-15 RX ADMIN — OXYCODONE AND ACETAMINOPHEN 1 TABLET: 5; 325 TABLET ORAL at 19:32

## 2023-01-15 RX ADMIN — OXYCODONE AND ACETAMINOPHEN 1 TABLET: 5; 325 TABLET ORAL at 15:04

## 2023-01-15 RX ADMIN — KETOROLAC TROMETHAMINE 15 MG: 30 INJECTION, SOLUTION INTRAMUSCULAR; INTRAVENOUS at 02:53

## 2023-01-15 RX ADMIN — MORPHINE SULFATE 4 MG: 4 INJECTION, SOLUTION INTRAMUSCULAR; INTRAVENOUS at 02:10

## 2023-01-15 RX ADMIN — HYDROMORPHONE HYDROCHLORIDE 1 MG: 1 INJECTION, SOLUTION INTRAMUSCULAR; INTRAVENOUS; SUBCUTANEOUS at 11:58

## 2023-01-15 RX ADMIN — PANTOPRAZOLE SODIUM 40 MG: 40 TABLET, DELAYED RELEASE ORAL at 11:53

## 2023-01-15 RX ADMIN — HYDROMORPHONE HYDROCHLORIDE 1 MG: 1 INJECTION, SOLUTION INTRAMUSCULAR; INTRAVENOUS; SUBCUTANEOUS at 07:07

## 2023-01-15 RX ADMIN — SODIUM CHLORIDE: 9 INJECTION, SOLUTION INTRAVENOUS at 11:56

## 2023-01-15 RX ADMIN — SODIUM CHLORIDE 1000 ML: 9 INJECTION, SOLUTION INTRAVENOUS at 02:17

## 2023-01-15 RX ADMIN — ONDANSETRON 4 MG: 2 INJECTION INTRAMUSCULAR; INTRAVENOUS at 02:10

## 2023-01-15 RX ADMIN — HYDROMORPHONE HYDROCHLORIDE 1 MG: 1 INJECTION, SOLUTION INTRAMUSCULAR; INTRAVENOUS; SUBCUTANEOUS at 17:02

## 2023-01-15 ASSESSMENT — PAIN DESCRIPTION - PAIN TYPE
TYPE: ACUTE PAIN;SURGICAL PAIN

## 2023-01-15 ASSESSMENT — PAIN SCALES - GENERAL
PAINLEVEL_OUTOF10: 9
PAINLEVEL_OUTOF10: 9
PAINLEVEL_OUTOF10: 6
PAINLEVEL_OUTOF10: 8
PAINLEVEL_OUTOF10: 7
PAINLEVEL_OUTOF10: 7
PAINLEVEL_OUTOF10: 9
PAINLEVEL_OUTOF10: 8
PAINLEVEL_OUTOF10: 8
PAINLEVEL_OUTOF10: 9
PAINLEVEL_OUTOF10: 10
PAINLEVEL_OUTOF10: 8
PAINLEVEL_OUTOF10: 7
PAINLEVEL_OUTOF10: 8
PAINLEVEL_OUTOF10: 8
PAINLEVEL_OUTOF10: 9
PAINLEVEL_OUTOF10: 9

## 2023-01-15 ASSESSMENT — PAIN DESCRIPTION - ONSET
ONSET: ON-GOING

## 2023-01-15 ASSESSMENT — PAIN DESCRIPTION - FREQUENCY
FREQUENCY: CONTINUOUS

## 2023-01-15 ASSESSMENT — PAIN DESCRIPTION - LOCATION
LOCATION: FLANK

## 2023-01-15 ASSESSMENT — PAIN - FUNCTIONAL ASSESSMENT
PAIN_FUNCTIONAL_ASSESSMENT: ACTIVITIES ARE NOT PREVENTED
PAIN_FUNCTIONAL_ASSESSMENT: PREVENTS OR INTERFERES SOME ACTIVE ACTIVITIES AND ADLS
PAIN_FUNCTIONAL_ASSESSMENT: 0-10
PAIN_FUNCTIONAL_ASSESSMENT: ACTIVITIES ARE NOT PREVENTED
PAIN_FUNCTIONAL_ASSESSMENT: ACTIVITIES ARE NOT PREVENTED
PAIN_FUNCTIONAL_ASSESSMENT: 0-10
PAIN_FUNCTIONAL_ASSESSMENT: ACTIVITIES ARE NOT PREVENTED

## 2023-01-15 ASSESSMENT — PAIN DESCRIPTION - ORIENTATION
ORIENTATION: LEFT

## 2023-01-15 ASSESSMENT — PAIN DESCRIPTION - DESCRIPTORS
DESCRIPTORS: SHARP
DESCRIPTORS: ACHING
DESCRIPTORS: ACHING;TENDER

## 2023-01-15 NOTE — ED PROVIDER NOTES
[facility] [address] [address] EMERGENCY DEPT      EMERGENCY MEDICINE     Pt Name: Evelyne Rojo  MRN: [id number]  [place] 1955  Date of evaluation: 1/15/2023  Provider: Mary Jane Ellington MD    CHIEF COMPLAINT       Chief Complaint   Patient presents with    Flank Pain     HISTORY OF PRESENT ILLNESS   Evelyne Rojo is a pleasant 74 y.o. female who presents to the emergency department from from home, brought in by EMS for evaluation of left flank pain. 3 days ago patient had a cystoscopy, ureteroscopy, and lithotripsy for nephrolithiasis removal.  She was started on ciprofloxacin. Since then she has developed worsening left flank pain despite treatment with Percocet as well as. She is endorsing nausea and vomiting. She has had decreased appetite but is tolerating fluids. Additionally endorses dysuria and hematuria.     PASTMEDICAL HISTORY     Past Medical History:   Diagnosis Date    Acute renal failure with acute cortical necrosis (HCC) 12/21/2019    Allergic rhinitis     Arthritis     back, arms, hips    Bipolar 1 disorder ([address]     Blood circulation, collateral     Cancer ([address] 2011    cancerous polyps removed - Dr. Toby Chan    Cataract     Colon polyps     COPD (chronic obstructive pulmonary disease) ([address] 07/24/2014    COPD (chronic obstructive pulmonary disease) ([address]     Coronary vasospasm ([address] 08/17/2019    Depression     Diverticulitis of colon     GERD (gastroesophageal reflux disease)     Glaucoma     Hearing loss     Hiatal hernia     History of arterial ischemic stroke 07/20/2019    History of colonoscopy 2002    History of kidney stones     Hx of blood clots 06/17/2014    PE and collar bone area after shoulder surgery    Hyperlipidemia     Hypertension     Liver disease     enlarged liver - damaged with alcohol in past but no cirrhosis per patient    Pneumonia 07/24/2014    Sexual problems     Suicidal thoughts     2015 admitted to  from [facility]    Thyroid disease     Urinary incontinence     Vomiting Wears dentures     Wears glasses        Patient Active Problem List   Diagnosis Code    GERD (gastroesophageal reflux disease) K21.9    Colon polyps K63.5    Chest pain, atypical R07.89    Gastroenteritis K52.9    Pneumonia J18.9    Shoulder pain M25.519    History of pulmonary embolism Z86.711    COPD (chronic obstructive pulmonary disease) ([facility]) J44.9    Hypoglycemia E16.2    Anticoagulated on Coumadin Z79.01    Bipolar disorder ([facility]) F31.9    Cannabis abuse F12.10    Cocaine abuse ([facility]) F14.10    Alcohol dependence in remission ([facility] F10.21    Cholecystitis with cholelithiasis K80.10    GI bleed K92.2    Erosive esophagitis K22.10    Hypokalemia E87.6    HTN (hypertension), benign I10    Bipolar 1 disorder ([facility] F31.9    Chronic pain G89.29    Hiatal hernia K44.9    Chest pain R07.9    Vomiting R11.10    Erosive gastritis K29.60    H pylori ulcer K27.9, B96.81    Hematemesis K92.0    History of Helicobacter pylori infection Z86.19    Intractable abdominal pain R10.9    Intractable vomiting with nausea R11.2    Epigastric abdominal pain R10.13    Acute blood loss anemia D62    Chronic chest pain R07.9, G89.29    Hyponatremia Q14.7    Metabolic acidosis I38.33    Essential hypertension I10    COPD with acute exacerbation ([facility]) J44.1    Hypothyroidism E03.9    History of DVT (deep vein thrombosis) Z86.718    Depression F32. A    Tobacco abuse Z72.0    Hematemesis with nausea K92.0    Hypoxia R09.02    Pleural effusion, bilateral J90    Suicidal ideation R45.851    Bipolar disorder, in partial remission, most recent episode depressed ([facility] F31.75    Bipolar II disorder ([facility] X23.06    Diastolic dysfunction T01.20    Angina, class II ([facility]) I20.9    Unstable angina ([facility]) I20.0    Dyslipidemia E78.5    Electrolyte abnormality E87.8    COPD exacerbation ([facility]) J44.1    Acute respiratory insufficiency R06.89    Chronic diastolic heart failure ([facility]) I50.32    Tobacco use disorder F17.200    Nasal septal deviation J34.2 Hypertrophy of left inferior nasal turbinate J34.3    Rhinogenic headache R51.9    Asymmetrical sensorineural hearing loss H90.3    Tinnitus, left ear H93.12    Psychosis ([facility]) F29    Substance-induced psychotic disorder ([facility]) F19.959    Bipolar 1 disorder, depressed ([address] F31.9    Polysubstance abuse ([address] F19.10    Major depressive disorder, recurrent ([address] F33.9    Stroke-like symptom R29.90    Right arm weakness R29.898    Delirium R41.0    Paresthesias R20.2    Depression, major, recurrent ([address] F33.9    Depression with suicidal ideation F32. A, R45.851    Amnesia R41. 3    Bipolar disorder, current episode mixed, moderate ([facility]) F31.62    Zbzwgxuea-Jdeohkx-Iarexn disease M22.40    Carpal tunnel syndrome of left wrist G56.02    Change of, skin texture R23.4    Chronic midline low back pain without sciatica M54.50, G89.29    Coronary vasospasm ([facility]) I20.1    Derangement of knee M23.90    Disorder associated with Helicobacter species C39.67    Disturbance of skin sensation R20.9    Encounter for surgical follow-up care Z48.89    Endometriosis N80.9    Environmental and seasonal allergies J30.89    Glaucoma suspect H40.009    History of arterial ischemic stroke Z86.73    Mixed hyperlipidemia E78.2    Large liver R16.0    Lesion of ulnar nerve G56.20    Nasal congestion R09.81    Nicotine dependence F17.200    Pancreatic insufficiency K86.89    Primary osteoarthritis involving multiple joints M15.9    Sciatica M54.30    Sprain of right foot S93.601A    Preoperative state [id number]    Type 2 diabetes mellitus without complication, without long-term current use of insulin ([facility]) E11.9    Urinary incontinence, overflow N39.490    Elevated lactic acid level R79.89    Acute renal failure with acute cortical necrosis ([facility]) N17.1    Pyelonephritis of left kidney N12    Pyelonephritis N12    Septicemia ([facility]) A41.9    Acute exacerbation of chronic obstructive pulmonary disease (COPD) ([facility]) J44.1     SURGICAL HISTORY       Past Surgical History:   Procedure Laterality Date    ABDOMEN SURGERY      x4 removed cysts and ovary removed    CARDIAC SURGERY      heart caths, no stents    CARPAL TUNNEL RELEASE Bilateral 2016    CHOLECYSTECTOMY, LAPAROSCOPIC  6/12/15    Dr. Chitra Vail N/A 2/10/2022    CYSTOSCOPY URETHRAL IMPLANT INJECTION performed by Susi Jerez MD at [address] N/A 12/27/2022    Cystoscopy Bladder Botox 200 units Left Stent Placement performed by Susi Jerez MD at [address] N/A 7/13/2020    CYSTOSCOPY WITH BLADDER BIOPSIES performed by Nicolasa Monet MD at [address] [facility], ESOPHAGUS      EGD      [facility] Right 10/7/2004    Dr. Leonid Gary Bilateral 2016    decompression    HYSTERECTOMY ([facility])  1998    Dr. Quinn Jones with [address] [address]  2004, 2014    right shoulder    SHOULDER SURGERY  2014    right    SKIN BIOPSY  2006    under left eye    STIMULATOR SURGERY N/A 11/4/2020    STAGE 1 INTERSTIM PLACEMENT performed by Nicolasa Monet MD at [facility] 11/23/2020    PLACEMENT OF STAGE II INTERSTIM performed by Nicolasa Monet MD at [facility] 4/14/2022    Removal of Interstim performed by Susi Jerez MD at [facility] Left 1/12/2023    Cystoscopy Left Ureteroscopy Laser Lithotripsy Basket Retrieval of Stone Fragments possible Left Ureteral Stent performed by Susi Jerez MD at [facility]       Previous Medications    ACETAMINOPHEN (TYLENOL) 325 MG TABLET    Take by mouth every 6 hours as needed for Pain 2 tab    ALBUTEROL (PROVENTIL) (2.5 MG/3ML) 0.083% NEBULIZER SOLUTION    Take 3 mLs by nebulization every 6 hours as needed for Wheezing    AMOXICILLIN-CLAVULANATE (AUGMENTIN) 500-125 MG PER TABLET    Take 1 tablet by mouth 3 times daily for 10 days    ARIPIPRAZOLE (ABILIFY) 2 MG TABLET    Take 2 mg by mouth daily    ASPIRIN 81 MG EC TABLET    Take 1 tablet by mouth daily    ATORVASTATIN (LIPITOR) 40 MG TABLET    Take 40 mg by mouth nightly     CARVEDILOL (COREG) 3.125 MG TABLET    TAKE 1 TABLET BY MOUTH TWICE DAILY    CIPROFLOXACIN (CIPRO) 500 MG TABLET    Take 1 tablet by mouth 2 times daily    ESCITALOPRAM (LEXAPRO) 20 MG TABLET    Take 30 mg by mouth daily     FENOFIBRATE 160 MG TABLET    Take 160 mg by mouth daily     FERROUS SULFATE 325 (65 FE) MG TABLET    Take 1 tablet by mouth 2 times daily    FLUTICASONE (FLONASE) 50 MCG/ACT NASAL SPRAY    1 spray by Each Nostril route 2 times daily    FLUTICASONE-UMECLIDIN-VILANT (TRELEGY ELLIPTA) 100-62.5-25 MCG/ACT AEPB INHALER    INHALE 1 PUFF INTO THE LUNGS DAILY    FOLIC ACID (FOLVITE) 1 MG TABLET    Take 1 mg by mouth daily    GUAIFENESIN (MUCINEX) 600 MG EXTENDED RELEASE TABLET    Take 1 tablet by mouth 2 times daily    HYDROXYZINE (VISTARIL) 50 MG CAPSULE    Take 1 capsule by mouth 3 times daily as needed for Itching or Anxiety    LEVOTHYROXINE (SYNTHROID) 75 MCG TABLET    Take 1 tablet by mouth daily everyday except Sunday take 150 mcg. OMEPRAZOLE (PRILOSEC) 40 MG DELAYED RELEASE CAPSULE    Take 1 capsule by mouth every morning (before breakfast)    ONDANSETRON (ZOFRAN-ODT) 4 MG DISINTEGRATING TABLET    Take 1 tablet by mouth 3 times daily as needed for Nausea or Vomiting    OXYCODONE-ACETAMINOPHEN (PERCOCET) 5-325 MG PER TABLET    Take 1 tablet by mouth every 6 hours as needed for Pain for up to 3 days. Intended supply: 3 days.  Take lowest dose possible to manage pain Max Daily Amount: 4 tablets    PHENAZOPYRIDINE (PYRIDIUM) 200 MG TABLET    Take 1 tablet by mouth 3 times daily as needed for Pain    PHENAZOPYRIDINE (PYRIDIUM) 200 MG TABLET    Take 1 tablet by mouth 3 times daily as needed for Pain    POTASSIUM CHLORIDE (KLOR-CON M) 20 MEQ EXTENDED RELEASE TABLET    Take 20 mEq by mouth daily    QUETIAPINE (SEROQUEL) 300 MG TABLET    Take 300 mg by mouth nightly 2 tab    SUCRALFATE (CARAFATE) 1 GM TABLET    Take 1 tablet by mouth 4 times daily    TRAZODONE (DESYREL) 100 MG TABLET    Take 2 tablets by mouth nightly       ALLERGIES     is allergic to seasonal.    FAMILY HISTORY     She indicated that her mother is . She indicated that her father is . She indicated that her sister is alive. She indicated that her brother is alive. She indicated that the status of her maternal grandmother is unknown. She indicated that the status of her maternal grandfather is unknown. She indicated that the status of her paternal grandmother is unknown. She indicated that the status of her paternal grandfather is unknown. She indicated that the status of her maternal uncle is unknown. She indicated that the status of her neg hx is unknown. SOCIAL HISTORY       Social History     Tobacco Use    Smoking status: Every Day     Packs/day: 0.50     Years: 30.00     Pack years: 15.00     Types: Cigarettes     Start date: 1977    Smokeless tobacco: Never    Tobacco comments:     Trying to quit   Vaping Use    Vaping Use: Never used   Substance Use Topics    Alcohol use: No     Comment: none for 2 years    Drug use: Not Currently     Frequency: 1.0 times per week     Types: Cocaine       PHYSICAL EXAM       ED Triage Vitals   BP Temp Temp Source Heart Rate Resp SpO2 Height Weight   01/15/23 0123 01/15/23 0123 01/15/23 0123 01/15/23 0123 01/15/23 0123 01/15/23 0123 01/15/23 0142 01/15/23 0142   (!) 186/81 98.4 °F (36.9 °C) Oral (!) 101 23 96 % 5' 4\" (1.626 m) 153 lb (69.4 kg)       Additional Vital Signs:  Vitals:    01/15/23 0330   BP: 139/70   Pulse: 66   Resp: 16   Temp:    SpO2: 96%     Physical Exam  Vitals reviewed. Constitutional:       Appearance: She is not ill-appearing or diaphoretic. HENT:      Head: Normocephalic and atraumatic.       Right Ear: External ear normal.      Left Ear: External ear normal.      Nose: Nose normal.      Mouth/Throat:      Mouth: Mucous membranes are moist.      Pharynx: Oropharynx is clear. Eyes:      Conjunctiva/sclera: Conjunctivae normal.   Cardiovascular:      Rate and Rhythm: Normal rate. Pulmonary:      Effort: Pulmonary effort is normal.   Abdominal:      General: Abdomen is flat. Palpations: Abdomen is soft. Tenderness: There is abdominal tenderness in the suprapubic area. There is left CVA tenderness. There is no right CVA tenderness, guarding or rebound. Comments: Surgical site over the mons is clean. Sutures intact. Dressings are dry. Skin:     General: Skin is warm and dry. Neurological:      General: No focal deficit present. Mental Status: She is alert and oriented to person, place, and time. Mental status is at baseline. Psychiatric:         Mood and Affect: Mood normal.         Behavior: Behavior normal.       FORMAL DIAGNOSTIC RESULTS     RADIOLOGY: Interpretation per the Radiologist below, if available at the time of this note (none if blank):    CT ABDOMEN PELVIS WO CONTRAST Additional Contrast? None   Final Result   Impression:   Left renal subcapsular hematoma measuring up to 2.3 cm in thickness,    likely secondary to lithotripsy. Properly positioned left ureteral stent    without hydronephrosis. This document has been electronically signed by: Julianne Zacarias MD    on 01/15/2023 03:16 AM      All CTs at this facility use dose modulation techniques and iterative    reconstructions, and/or weight-based dosing   when appropriate to reduce radiation to a low as reasonably achievable.           LABS: (none if blank)  Labs Reviewed   BASIC METABOLIC PANEL - Abnormal; Notable for the following components:       Result Value    Sodium 134 (*)     CO2 21 (*)     Glucose 110 (*)     All other components within normal limits   URINALYSIS WITH MICROSCOPIC - Abnormal; Notable for the following components:    Blood, Urine LARGE (*)     Protein,  (*)     Leukocyte Esterase, Urine LARGE (*)     Character, Urine CLOUDY (*)     All other components within normal limits   OSMOLALITY - Abnormal; Notable for the following components:    Osmolality Calc 268.6 (*)     All other components within normal limits   CBC WITH AUTO DIFFERENTIAL   LIPASE   HEPATIC FUNCTION PANEL   ANION GAP   GLOMERULAR FILTRATION RATE, ESTIMATED       (Any cultures that may have been sent were not resulted at the time of this patient visit)    [address] / ED COURSE:     1) Number and Complexity of Problems            Problem List This Visit:         Chief Complaint   Patient presents with    Flank Pain            Differential Diagnosis includes (but not limited to): Herniated disc, AAA rupture, pyelonephritis, kidney stone, musculoskeletal pain, retroperitoneal hematoma, pancreatitis, splenic rupture, pneumonia             Pertinent Comorbid Conditions:    Nephrolithiasis, recent lithotripsy    2)  Data Reviewed (none if left blank)          My Independent interpretations:     EKG:          Imaging: CT of the abdomen pelvis demonstrating subcapsular hematoma on the left    Labs:      Hemoglobin within normal limits. No leukocytosis. Decision Rules/Clinical Scores utilized:              External Documentation Reviewed:         Previous patient encounter documents & history available on EMR was reviewed              See Formal Diagnostic Results above for the lab and radiology tests and orders.     3)  Treatment and Disposition         ED Reassessment: See ED course         Case discussed with consulting clinician: Faraz Sweeney, urology team         Shared Decision-Making was performed and disposition discussed with the        Patient/Family and questions answered          Social determinants of health impacting treatment or disposition:           Code Status: Full code      Summary of Patient Presentation:      MDM  Number of Diagnoses or Management Options  Hematoma of left kidney, initial encounter: new, needed workup  Diagnosis management comments: This is a 51-year-old female coming in with worsening left flank pain nausea, vomiting 3 days following lithotripsy for stone removal.  Does have CVA tenderness. CT imaging revealing a subcapsular hematoma on the left likely secondary to the lithotripsy. Hemoglobin is within normal limits and stable. No signs of hemodynamic compromise here. Given the patient is requiring increasing dosages of IV narcotic medication, recommended that patient come in for pain control as well as serial hemoglobin monitoring. I did discuss the case with the urology PA on-call, Stephanie Mojica.  Patient is amenable to the plan of care. Amount and/or Complexity of Data Reviewed  Clinical lab tests: ordered and reviewed  Tests in the radiology section of CPT®: ordered and reviewed  Discussion of test results with the performing providers: no  Obtain history from someone other than the patient: no  Review and summarize past medical records: yes  Discuss the patient with other providers: yes  Independent visualization of images, tracings, or specimens: yes    Risk of Complications, Morbidity, and/or Mortality  Presenting problems: moderate  Diagnostic procedures: moderate  Management options: moderate    Patient Progress  Patient progress: stable  /  ED Course as of 01/15/23 0426   Sun Suresh 15, 2023   0334 Received critical value notification for CT scan result from Charge nurse. [DT]   5822 Spoke with urology team, Stephanie Mojica regarding case. Will need to admit patient for pain control and serial H/H monitoring. Urology to admit. [JT]   [id number] Results discussed with the patient. Did discuss the need for admission for pain control as well as serial hemoglobin monitoring. Patient is amenable to the plan of care. Pain controlled at this time.  [JT]      ED Course User Index  [DT] Tanya Pardo MD  [JT] Lexi Dueñas MD     Vitals Reviewed: Vitals:    01/15/23 0123 01/15/23 0142 01/15/23 0252 01/15/23 0330   BP: (!) 186/81  (!) 169/86 139/70   Pulse: (!) 101  66 66   Resp: 23 16 16   Temp: 98.4 °F (36.9 °C)      TempSrc: Oral      SpO2: 96%  97% 96%   Weight:  153 lb (69.4 kg)     Height:  5' 4\" (1.626 m)         The patient was seen and examined. Appropriate diagnostic testing was performed and results reviewed with the patient. The results of pertinent diagnostic studies and exam findings were discussed. The patients provisional diagnosis and plan of care were discussed with the patient and present family who expressed understanding. Any medications were reviewed and indications and risks of medications were discussed with the patient /family present. ED Medications administered this visit:  (None if blank)  Medications   lidocaine 4 % external patch 1 patch (has no administration in time range)   ondansetron (ZOFRAN) injection 4 mg (4 mg IntraVENous Given 1/15/23 0210)   morphine injection 4 mg (4 mg IntraVENous Given 1/15/23 0210)   0.9 % sodium chloride bolus (0 mLs IntraVENous Stopped 1/15/23 0337)   ketorolac (TORADOL) injection 15 mg (15 mg IntraVENous Given 1/15/23 0253)   HYDROmorphone (DILAUDID) injection 1 mg (1 mg IntraVENous Given 1/15/23 0330)         PROCEDURES: (None if blank)  Procedures:     CRITICAL CARE: (None if blank)      DISCHARGE PRESCRIPTIONS: (None if blank)  New Prescriptions    No medications on file       FINAL IMPRESSION      1. Hematoma of left kidney, initial encounter          DISPOSITION/PLAN   DISPOSITION Decision To Admit 01/15/2023 03:46:18 AM      OUTPATIENT FOLLOW UP THE PATIENT:  No follow-up provider specified.     MD Maria C Briggs MD  Resident  01/15/23 1998

## 2023-01-15 NOTE — PROGRESS NOTES
Pt admitted to  5K28 by wheelchair from  ED . IV none infusing into the forearm left, condition patent and no redness. IV site free of s/s of infection or infiltration. Vital signs obtained. Assessment complete. Oriented to room. All questions answered with no further questions at this time. Two nurse skin assessment performed by Mariah Ruiz and Julia Sharp RN. Oriented to room. Policies and procedures for  explained. A Fall prevention and safety brochure discussed with patient. Bed alarm on. Call light in reach. Christine Alonzo alerted to patient arriving on the unit. Patient complaining of 9/10 pain. Nurse asked for pain meds, and other orders. Will continue to monitor.

## 2023-01-15 NOTE — ED NOTES
Patient reports difficulty emptying her bladder and burning when urinating. Currently wears a pull up due to incontinence from \"dribbling\".      Shmuel De La Vega RN  01/15/23 09

## 2023-01-15 NOTE — ED PROVIDER NOTES
[address] [facility]  EMERGENCY MEDICINE ATTENDING ATTESTATION      Evaluation of Kayli Almeida. Case discussed and care plan developed with resident physician. I agree with the resident physician documentation and plan as documented by him, except if my documentation differs. Patient seen, interviewed and examined by me. I reviewed the medical, surgical, family and social history, medications and allergies. I have reviewed the nursing documentation. Brief H&P   Patient c/o left flank pain. Chest 3 days post lithotripsy with left ureteral stent placement and ureteroscopy. States her pain was not improving with Percocet at home. Physical exam is notable for well appearing, uncomfortable from pain. Left CVA tenderness. Medical Decision Making   MDM:   Persistent pain post hydroxy, may be due to to expected postprocedure pain versus new ureterolithiasis, renal hematoma, pyelonephritis  Plan:   IV line, labs  IV fluids  Imaging  Analgesia  Observation in the ED while awaiting results    Please see the resident physician completed note for final disposition except as documented on this attestation. I have reviewed and interpreted all available lab, radiology and ekg results available at the moment. Diagnosis, treatment and disposition plans were discussed and agreed upon by patient. This transcription was electronically signed. It was dictated by use of voice recognition software and electronically transcribed. The transcription may contain errors not detected in proofreading.      I performed direct supervision and was present for the critical portion following procedures: None  Critical care time on this case: None    Electronically signed by Daniele Martinez MD on 1/15/23 at 2:54 AM EST        Daniele Martinez MD  01/15/23 6538

## 2023-01-15 NOTE — ED NOTES
ED to inpatient nurses report    Chief Complaint   Patient presents with    Flank Pain      Present to ED from home  LOC: alert and orientated to name, place, date  Vital signs   Vitals:    01/15/23 0142 01/15/23 0252 01/15/23 0330 01/15/23 0527   BP:  (!) 169/86 139/70 131/63   Pulse:  66 66 63   Resp:  16 16 16   Temp:       TempSrc:       SpO2:  97% 96% 93%   Weight: 153 lb (69.4 kg)      Height: 5' 4\" (1.626 m)         Oxygen Baseline RA    Current needs required none Bipap/Cpap No  LDAs:   Peripheral IV 01/15/23 Left Forearm (Active)   Site Assessment Clean, dry & intact 01/15/23 0141   Line Status Blood return noted;Capped;Flushed;Normal saline locked 01/15/23 0141   Phlebitis Assessment No symptoms 01/15/23 0141   Infiltration Assessment 0 01/15/23 0141   Alcohol Cap Used No 01/15/23 0141   Dressing Status Clean, dry & intact 01/15/23 0141   Dressing Type Transparent 01/15/23 0141     Mobility: Independent  Pending ED orders: none  Present condition: asleep    C-SSRS Risk of Suicide: No Risk  Swallow Screening    Preferred Language: [place]     Electronically signed by Glenys Daigle RN on 1/15/2023 at 5:32 AM       Glenys Daigle RN  01/15/23 0564

## 2023-01-15 NOTE — ED TRIAGE NOTES
Stents placed in the L kidney 1 week ago r/t stones. Here today with worsening L sided flank pain rated 10/10 described as sharp/stabbing. Home pain meds ineffective.

## 2023-01-15 NOTE — H&P
[facility]  [address] [facility] [address]  Dept: [phone]  Loc: [phone]  Visit Date: 1/15/2023    History and Physical    Chief Complaint: Left Flank Pain    HPI:  Patient presented to the [facility] ED late last evening for severe left flank pain. S/P cystoscopy left ureteroscopy, laser lithotripsy with left ureteral stent placement by Dr. Selam Sutherland on 23. Patient states her pain persisted following surgery despite taking percocet as prescribed. Pain began to worsen over the last 24 hours. Rated as a 10/10 on presentation. Radiates to the LLQ. Denies fever or chills. Taking Ciprofloxacin following surgery. Seated comfortably in bed this AM. Rates her pain as a 3/10. Requiring Dilaudid for pain control.       Pertinent Urology Labs  Hgb- 12.9   WBC- 7.1  Creatinine- 0.7        Vitals:  BP (!) 126/58   Pulse 52   Temp 97.4 °F (36.3 °C) (Oral)   Resp 18   Ht 5' 4\" (1.626 m)   Wt 153 lb (69.4 kg)   SpO2 94%   BMI 26.26 kg/m²   Temp  Av.7 °F (36.5 °C)  Min: 97.3 °F (36.3 °C)  Max: 98.4 °F (36.9 °C)    No intake or output data in the 24 hours ending 01/15/23 1017    Social History     Socioeconomic History    Marital status:      Spouse name: Not on file    Number of children: 3    Years of education: 12    Highest education level: Not on file   Occupational History    Occupation: on disability     Employer: [employer]   Tobacco Use    Smoking status: Every Day     Packs/day: 0.50     Years: 30.00     Pack years: 15.00     Types: Cigarettes     Start date: 1977    Smokeless tobacco: Never    Tobacco comments:     Trying to quit   Vaping Use    Vaping Use: Never used   Substance and Sexual Activity    Alcohol use: No     Comment: none for 2 years    Drug use: Not Currently     Frequency: 1.0 times per week     Types: Cocaine    Sexual activity: Not Currently   Other Topics Concern    Not on file   Social History Narrative    Not on file     Social Determinants of Health     Financial Resource Strain: Not on file   Food Insecurity: Not on file   Transportation Needs: Not on file   Physical Activity: Not on file   Stress: Not on file   Social Connections: Not on file   Intimate Partner Violence: Not on file   Housing Stability: Not on file     Family History   Problem Relation Age of Onset    Cancer Mother     Heart Disease Mother     High Blood Pressure Mother     High Cholesterol Mother     Cancer Father         brain    Depression Father     Heart Disease Father     High Cholesterol Father     Mental Illness Father     Cancer Sister     Heart Attack Sister     Heart Disease Maternal Uncle     High Cholesterol Maternal Uncle     Diabetes Maternal Grandmother     Heart Disease Maternal Grandmother     High Cholesterol Maternal Grandmother     Early Death Maternal Grandfather     Heart Disease Maternal Grandfather     High Cholesterol Maternal Grandfather     Stroke Maternal Grandfather     Heart Disease Paternal Grandmother     High Cholesterol Paternal Grandmother     Heart Disease Paternal Grandfather     High Cholesterol Paternal Grandfather     Colon Cancer Neg Hx     Inflam Bowel Dis Neg Hx      Allergies   Allergen Reactions    Seasonal Other (See Comments)     sneezing       Objective:    Constitutional: Alert and oriented times x3, no acute distress, and cooperative to examination with appropriate mood and affect. Cardiovascular:   Normal rate and regular rhythm. Pulmonary/Chest:  Normal respiratory rate and rhthym. No use of accessory muscles. Abdominal:          Soft. No tenderness. Active bowel sounds. Genitourinary:    Left CVA tenderness. Stent in place, string visible and intact. Neurological:    Alert and oriented.      Labs:  WBC:    Lab Results   Component Value Date/Time    WBC 7.1 01/15/2023 01:35 AM     Hemoglobin/Hematocrit:    Lab Results   Component Value Date/Time    HGB 12.9 01/15/2023 01:35 AM    HCT 38.7 01/15/2023 01:35 AM     BMP:    Lab Results   Component Value Date/Time     01/15/2023 01:35 AM    K 4.0 01/15/2023 01:35 AM    K 4.0 11/08/2022 07:48 AM     01/15/2023 01:35 AM    CO2 21 01/15/2023 01:35 AM    BUN 12 01/15/2023 01:35 AM    LABALBU 3.8 01/15/2023 01:35 AM    LABALBU 4.3 04/19/2012 10:25 AM    CREATININE 0.7 01/15/2023 01:35 AM    CALCIUM 9.7 01/15/2023 01:35 AM    GFRAA >60 02/25/2019 08:53 PM    LABGLOM >60 01/15/2023 01:35 AM       Impression/Plan:  Left Renal Subscapular Hematoma  - S/P Cystoscopy, Left Ureteroscopy, Laser Lithotripsy, Basket Retrieval, Left Stent on 1/12.  - Hematoma measuring 2.3 cm. Patient is hemodynamically stable. Patient admitted for monitoring and pain control.   - Percocet primary pain medication. Dilaudid 1 mg Q4h PRN for breakthrough pain. - Continue Ciprofloxacin.   - Will monitor H/H, BMP, and pain today and evaluate for potential discharge tomorrow.        Tj Alonzo PA-C  01/15/23 10:17 AM  Urology

## 2023-01-15 NOTE — PLAN OF CARE
Problem: Pain  Goal: Verbalizes/displays adequate comfort level or baseline comfort level  Outcome: Progressing  Flowsheets (Taken 1/15/2023 0807)  Verbalizes/displays adequate comfort level or baseline comfort level:   Encourage patient to monitor pain and request assistance   Assess pain using appropriate pain scale   Administer analgesics based on type and severity of pain and evaluate response   Implement non-pharmacological measures as appropriate and evaluate response   Pain Assessment: 0-10  Pain Level: 9   Patient's Stated Pain Goal: 5   Is pain goal met at this time? No     Non-Pharmaceutical Pain Intervention(s): Rest, Repositioned    Problem: ABCDS Injury Assessment  Goal: Absence of physical injury  Outcome: Progressing   Patient remains free from injury. Ambulates with standby assist.    Problem: Discharge Planning  Goal: Discharge to home or other facility with appropriate resources  Outcome: Progressing  Flowsheets (Taken 1/15/2023 0807)  Discharge to home or other facility with appropriate resources:   Identify barriers to discharge with patient and caregiver   Arrange for needed discharge resources and transportation as appropriate   Identify discharge learning needs (meds, wound care, etc)   Discharge plan is home. Care plan reviewed with patient. Patient verbalize understanding of the plan of care and contribute to goal setting.

## 2023-01-15 NOTE — ED NOTES
Patient reports multiple bouts of emesis at home post procedure. Since arrival has vomited an additional 2xs with some intermittent dry heaving.       Ezequiel Sarkar RN  01/15/23 3144

## 2023-01-16 DIAGNOSIS — N20.0 NEPHROLITHIASIS: Primary | ICD-10-CM

## 2023-01-16 LAB
ANION GAP SERPL CALCULATED.3IONS-SCNC: 8 MEQ/L (ref 8–16)
BUN BLDV-MCNC: 11 MG/DL (ref 7–22)
CALCIUM SERPL-MCNC: 8.9 MG/DL (ref 8.5–10.5)
CHLORIDE BLD-SCNC: 102 MEQ/L (ref 98–111)
CO2: 24 MEQ/L (ref 23–33)
CREAT SERPL-MCNC: 0.7 MG/DL (ref 0.4–1.2)
GFR SERPL CREATININE-BSD FRML MDRD: > 60 ML/MIN/1.73M2
GLUCOSE BLD-MCNC: 109 MG/DL (ref 70–108)
HCT VFR BLD CALC: 31.4 % (ref 37–47)
HCT VFR BLD CALC: 33.6 % (ref 37–47)
HCT VFR BLD CALC: 34.1 % (ref 37–47)
HCT VFR BLD CALC: 34.1 % (ref 37–47)
HEMOGLOBIN: 10.4 GM/DL (ref 12–16)
HEMOGLOBIN: 10.8 GM/DL (ref 12–16)
HEMOGLOBIN: 10.9 GM/DL (ref 12–16)
HEMOGLOBIN: 11.2 GM/DL (ref 12–16)
POTASSIUM SERPL-SCNC: 4 MEQ/L (ref 3.5–5.2)
SODIUM BLD-SCNC: 134 MEQ/L (ref 135–145)

## 2023-01-16 PROCEDURE — 85014 HEMATOCRIT: CPT

## 2023-01-16 PROCEDURE — 6370000000 HC RX 637 (ALT 250 FOR IP)

## 2023-01-16 PROCEDURE — 6370000000 HC RX 637 (ALT 250 FOR IP): Performed by: STUDENT IN AN ORGANIZED HEALTH CARE EDUCATION/TRAINING PROGRAM

## 2023-01-16 PROCEDURE — 85018 HEMOGLOBIN: CPT

## 2023-01-16 PROCEDURE — 6360000002 HC RX W HCPCS

## 2023-01-16 PROCEDURE — 51798 US URINE CAPACITY MEASURE: CPT

## 2023-01-16 PROCEDURE — 2580000003 HC RX 258

## 2023-01-16 PROCEDURE — 36415 COLL VENOUS BLD VENIPUNCTURE: CPT

## 2023-01-16 PROCEDURE — 80048 BASIC METABOLIC PNL TOTAL CA: CPT

## 2023-01-16 PROCEDURE — 1200000000 HC SEMI PRIVATE

## 2023-01-16 RX ORDER — CIPROFLOXACIN 500 MG/1
500 TABLET, FILM COATED ORAL 2 TIMES DAILY
Status: DISCONTINUED | OUTPATIENT
Start: 2023-01-16 | End: 2023-01-17

## 2023-01-16 RX ORDER — ONDANSETRON 2 MG/ML
4 INJECTION INTRAMUSCULAR; INTRAVENOUS EVERY 4 HOURS PRN
Status: DISCONTINUED | OUTPATIENT
Start: 2023-01-16 | End: 2023-01-17 | Stop reason: HOSPADM

## 2023-01-16 RX ORDER — POLYETHYLENE GLYCOL 3350 17 G/17G
17 POWDER, FOR SOLUTION ORAL DAILY PRN
Status: DISCONTINUED | OUTPATIENT
Start: 2023-01-16 | End: 2023-01-17 | Stop reason: HOSPADM

## 2023-01-16 RX ORDER — AMOXICILLIN AND CLAVULANATE POTASSIUM 500; 125 MG/1; MG/1
1 TABLET, FILM COATED ORAL EVERY 8 HOURS SCHEDULED
Status: DISCONTINUED | OUTPATIENT
Start: 2023-01-16 | End: 2023-01-17 | Stop reason: HOSPADM

## 2023-01-16 RX ADMIN — AMOXICILLIN AND CLAVULANATE POTASSIUM 1 TABLET: 500; 125 TABLET, FILM COATED ORAL at 06:01

## 2023-01-16 RX ADMIN — HYDROMORPHONE HYDROCHLORIDE 1 MG: 1 INJECTION, SOLUTION INTRAMUSCULAR; INTRAVENOUS; SUBCUTANEOUS at 19:41

## 2023-01-16 RX ADMIN — OXYCODONE AND ACETAMINOPHEN 1 TABLET: 5; 325 TABLET ORAL at 13:05

## 2023-01-16 RX ADMIN — OXYCODONE AND ACETAMINOPHEN 1 TABLET: 5; 325 TABLET ORAL at 04:38

## 2023-01-16 RX ADMIN — AMOXICILLIN AND CLAVULANATE POTASSIUM 1 TABLET: 500; 125 TABLET, FILM COATED ORAL at 21:00

## 2023-01-16 RX ADMIN — POLYETHYLENE GLYCOL 3350 17 G: 17 POWDER, FOR SOLUTION ORAL at 13:05

## 2023-01-16 RX ADMIN — CIPROFLOXACIN 500 MG: 500 TABLET, FILM COATED ORAL at 19:42

## 2023-01-16 RX ADMIN — PANTOPRAZOLE SODIUM 40 MG: 40 TABLET, DELAYED RELEASE ORAL at 06:01

## 2023-01-16 RX ADMIN — CIPROFLOXACIN 500 MG: 500 TABLET, FILM COATED ORAL at 09:05

## 2023-01-16 RX ADMIN — HYDROMORPHONE HYDROCHLORIDE 1 MG: 1 INJECTION, SOLUTION INTRAMUSCULAR; INTRAVENOUS; SUBCUTANEOUS at 01:44

## 2023-01-16 RX ADMIN — OXYCODONE AND ACETAMINOPHEN 1 TABLET: 5; 325 TABLET ORAL at 22:07

## 2023-01-16 RX ADMIN — OXYCODONE AND ACETAMINOPHEN 1 TABLET: 5; 325 TABLET ORAL at 17:40

## 2023-01-16 RX ADMIN — ALUMINUM HYDROXIDE, MAGNESIUM HYDROXIDE, AND SIMETHICONE: 200; 200; 20 SUSPENSION ORAL at 00:54

## 2023-01-16 RX ADMIN — HYDROMORPHONE HYDROCHLORIDE 1 MG: 1 INJECTION, SOLUTION INTRAMUSCULAR; INTRAVENOUS; SUBCUTANEOUS at 15:12

## 2023-01-16 RX ADMIN — OXYCODONE AND ACETAMINOPHEN 1 TABLET: 5; 325 TABLET ORAL at 00:31

## 2023-01-16 RX ADMIN — HYDROMORPHONE HYDROCHLORIDE 1 MG: 1 INJECTION, SOLUTION INTRAMUSCULAR; INTRAVENOUS; SUBCUTANEOUS at 10:39

## 2023-01-16 RX ADMIN — HYDROMORPHONE HYDROCHLORIDE 1 MG: 1 INJECTION, SOLUTION INTRAMUSCULAR; INTRAVENOUS; SUBCUTANEOUS at 23:44

## 2023-01-16 RX ADMIN — SODIUM CHLORIDE: 9 INJECTION, SOLUTION INTRAVENOUS at 07:37

## 2023-01-16 RX ADMIN — AMOXICILLIN AND CLAVULANATE POTASSIUM 1 TABLET: 500; 125 TABLET, FILM COATED ORAL at 13:06

## 2023-01-16 RX ADMIN — HYDROMORPHONE HYDROCHLORIDE 1 MG: 1 INJECTION, SOLUTION INTRAMUSCULAR; INTRAVENOUS; SUBCUTANEOUS at 06:00

## 2023-01-16 RX ADMIN — OXYCODONE AND ACETAMINOPHEN 1 TABLET: 5; 325 TABLET ORAL at 09:05

## 2023-01-16 RX ADMIN — SODIUM CHLORIDE: 9 INJECTION, SOLUTION INTRAVENOUS at 17:51

## 2023-01-16 RX ADMIN — ONDANSETRON 4 MG: 2 INJECTION INTRAMUSCULAR; INTRAVENOUS at 09:05

## 2023-01-16 ASSESSMENT — PAIN DESCRIPTION - DESCRIPTORS
DESCRIPTORS: ACHING;TENDER
DESCRIPTORS: ACHING
DESCRIPTORS: ACHING;TENDER
DESCRIPTORS: ACHING
DESCRIPTORS: ACHING;TENDER
DESCRIPTORS: ACHING;TENDER

## 2023-01-16 ASSESSMENT — PAIN DESCRIPTION - PAIN TYPE
TYPE: ACUTE PAIN

## 2023-01-16 ASSESSMENT — PAIN DESCRIPTION - FREQUENCY
FREQUENCY: CONTINUOUS

## 2023-01-16 ASSESSMENT — PAIN DESCRIPTION - LOCATION
LOCATION: FLANK

## 2023-01-16 ASSESSMENT — PAIN SCALES - GENERAL
PAINLEVEL_OUTOF10: 9
PAINLEVEL_OUTOF10: 9
PAINLEVEL_OUTOF10: 7
PAINLEVEL_OUTOF10: 8
PAINLEVEL_OUTOF10: 5
PAINLEVEL_OUTOF10: 9
PAINLEVEL_OUTOF10: 9
PAINLEVEL_OUTOF10: 7
PAINLEVEL_OUTOF10: 7
PAINLEVEL_OUTOF10: 9
PAINLEVEL_OUTOF10: 9
PAINLEVEL_OUTOF10: 7
PAINLEVEL_OUTOF10: 10
PAINLEVEL_OUTOF10: 7
PAINLEVEL_OUTOF10: 7
PAINLEVEL_OUTOF10: 9
PAINLEVEL_OUTOF10: 6
PAINLEVEL_OUTOF10: 9

## 2023-01-16 ASSESSMENT — PAIN DESCRIPTION - ONSET
ONSET: ON-GOING

## 2023-01-16 ASSESSMENT — PAIN DESCRIPTION - ORIENTATION
ORIENTATION: LEFT

## 2023-01-16 ASSESSMENT — PAIN - FUNCTIONAL ASSESSMENT
PAIN_FUNCTIONAL_ASSESSMENT: ACTIVITIES ARE NOT PREVENTED

## 2023-01-16 NOTE — PROCEDURES
Bladder scan was completed by Doc Fuentes at 9684. The residual amount of 11ml was resulted to St[facility].

## 2023-01-16 NOTE — OP NOTE
Operative Note      Patient: Miguel Cox  YOB: 1955  MRN: [id number]    Date of Procedure: 1/12/2023    Pre-Op Diagnosis: Kidney stone [N20.0]    Post-Op Diagnosis: Same    Procedure: cystoscopy left ureteroscopy, left holmium laser lithotripsy and stent exchange      Surgeon(s):  Joana Espinal MD    Assistant:   * No surgical staff found *    Anesthesia: General    Estimated Blood Loss (mL): Minimal    Complications: None    Specimens:   * No specimens in log *    Implants:  Implant Name Type Inv. Item Serial No.  Lot No. LRB No. Used Action   SET URET STNT 6FR L26CM BLACK SACHIN DBL PGTL RADPQ INCL VYN - [id number]  SET URET STNT 6FR L26CM BLACK SACHIN DBL PGTL RADPQ INCL Stacy Mcbride [id number] Left 1 Implanted         Drains: * No LDAs found *    Findings: left lower pole large stone    Detailed Description of Procedure:        INDICATIONS FOR PROCEDURE:  Miguel Cox is a 74 y.o. female presents for Left sided calculus. After the risks, benefits, alternatives, of the procedure were discussed with the patient, informed consent was obtained. The patient elected to proceed. DETAILS OF THE PROCEDURE:  The patient was brought back from the preoperative holding area to the  operating suite, and was transferred to the operating table where the patient lay in  supine position. EPC's were in place, connected to the machine and the machine was turned on before induction. General endotracheal anesthesia was induced, and patient was prepped and draped in standard surgical fashion after being placed in dorsolithotomy position. A proper timeout was performed, preoperative antibiotics were given. We began by inserting a cystoscope with a 22 Tongan sheath and 30 degree lens into the patient's urethral meatus and advancing into the bladder without complication. A pan cystoscopy was preformed and the bladder appeared unremarkable.   We then focused our attention on the Left ureteral orifice and stent, which we cannulated with our glidewire, advanced up to renal pelvis. Once in good position, we advanced our flexible ureteroscope next to  the working wire to the renal pelvis under direct visualization. We identified the renal calculus and using a 200 micron holmium laser fiber we fragmented the calculus. It was dusted and the fragments appeared to be under 1 millimeter and could pass easily. At this time a complete pyeloscopy was preformed and no other substantial fragments were identified. We withdrew the ureteroscope and visualized the entire ureter. No stone fragments were identified. No damage to the ureter was identified. At this time, over the remaining glidewire, we placed a 6x26 double J ureteral stent in the usual fashion, and we noted appropriate placement in the upper collecting system using fluoroscopy. There was a good curl noted in the bladder. Fluoroscopy imaging at the end of the case showed no radio-opaque areas. We decided to leave a string at the end of the stent, which was attached with benzoin and steri-strips. The patient's bladder was drained and removed the scope and the procedure was subsequently terminated.         Plan:  Discharge home in good condition  The patient can pull the stent in 5 days     Electronically signed by Anahy Pisano M.D, MD on 1/16/2023 at 9:41 AM

## 2023-01-16 NOTE — PLAN OF CARE
Problem: Discharge Planning  Goal: Discharge to home or other facility with appropriate resources  1/16/2023 0217 by Wing Yousif RN  Outcome: Progressing   Discharge date is unknown at this time. Problem: Pain  Goal: Verbalizes/displays adequate comfort level or baseline comfort level  1/16/2023 0217 by Wing Yousif RN  Outcome: Progressing   Pain Assessment: 0-10  Pain Level: 7   Patient's Stated Pain Goal: 5   Is pain goal met at this time? No     Non-Pharmaceutical Pain Intervention(s): Rest    Problem: Skin/Tissue Integrity - Adult  Goal: Skin integrity remains intact  Outcome: Progressing   Skin assessment completed. Patient turned every 2 hours and as needed. No skin breakdown this shift. Problem: Musculoskeletal - Adult  Goal: Return mobility to safest level of function  Outcome: Progressing     Problem: Genitourinary - Adult  Goal: Absence of urinary retention  Outcome: Progressing     Problem: Infection - Adult  Goal: Absence of infection during hospitalization  1/16/2023 0220 by Wing Yousif RN  Outcome: Progressing   Patient starting Cipro and Augmentin today. Problem: ABCDS Injury Assessment  Goal: Absence of physical injury  1/16/2023 0217 by Wing Yousif RN  Outcome: Progressing   All fall precautions in place. Bed in low position, alarm activated and appropriate use of call light. Problem: Metabolic/Fluid and Electrolytes - Adult  Goal: Electrolytes maintained within normal limits  Outcome: Progressing       Care plan reviewed with patient. Patient verbalizes understanding of the plan of care and contributes to goal setting.

## 2023-01-16 NOTE — CARE COORDINATION
Case Management Assessment  Initial Evaluation    Date/Time of Evaluation: 1/16/2023 11:23 AM  Assessment Completed by: Mayte Hercules RN    If patient is discharged prior to next notation, then this note serves as note for discharge by case management. Patient Name: Miguel Cox                   YOB: 1955  Diagnosis: Renal hematoma, left, initial encounter [G68.801O]  Hematoma of left kidney, initial encounter [S37.012A]                   Date / Time: 1/15/2023  1:15 AM  [facility]28/028-A     Patient Admission Status: Inpatient   Readmission Risk (Low < 19, Mod (19-27), High > 27): Readmission Risk Score: 16.2    Current PCP: BA Oliveira CNP  PCP verified by CM? Yes    Chart Reviewed: Yes      History Provided by: Patient  Patient Orientation: Alert and Oriented    Patient Cognition: Alert    Hospitalization in the last 30 days (Readmission):  No    If yes, Readmission Assessment in CM Navigator will be completed. Advance Directives:      Code Status: Prior   Patient's Primary Decision Maker is: Named in [address] [place] [address] (Active): Toni Lundberg Brother/Sister - [phone]    Discharge Planning:    Patient lives with: Family Members Type of Home: House  Primary Care Giver: Self  Patient Support Systems include: Family Members (friends)   Current Financial resources: Medicaid, Medicare  Current community resources: None  Current services prior to admission: None            Current DME:              Type of Home Care services:  None    ADLS  Prior functional level: Independent in ADLs/IADLs  Current functional level: Independent in ADLs/IADLs    Family can provide assistance at DC: Yes  Would you like Case Management to discuss the discharge plan with any other family members/significant others, and if so, who?     Plans to Return to Present Housing: Yes  Other Identified Issues/Barriers to RETURNING to current housing: medical complications  Potential Assistance needed at discharge: N/A            Potential DME:    Patient expects to discharge to: [facility] for transportation at discharge: Self    Financial    Payor: Clarke Wilder / Plan: Brandon Cassidy / Product Type: *No Product type* /     Does insurance require precert for SNF: Yes    Potential assistance Purchasing Medications: No  Meds-to-Beds request: Yes      [address] - [place], [address]  [address] [facility] [address] [address]  Phone: [phone] Fax: [address] [facility] [address]60 - [place], [facility] [place] [address] [facility] [phone] Nikkie Hercules [phone]  [place] [address]  [address] [facility] [address]  Phone: [phone] Fax: Mable Miller [phone] - [place], [address] [phone] Nikkie Hercules [phone]  [address] [facility]  [address] [facility] [address]  Phone: [phone] Fax: [phone]      Notes:    Factors facilitating achievement of predicted outcomes: Family support    Barriers to discharge: Medical complications    Additional Case Management Notes: From ED, S/P lithotripsy, cystoscopy on 1/12. Hgb 10.8, urine (+), pain control, IV fluids, Cipro, Augmentin, Protonix. Procedure:   1/15 CR Abdomen Pelvis WO Contrast Impression:   Left renal subcapsular hematoma measuring up to 2.3 cm in thickness,   likely secondary to lithotripsy. Properly positioned left ureteral stent   without hydronephrosis    The Plan for Transition of Care is related to the following treatment goals of Renal hematoma, left, initial encounter [M18.961Z]  Hematoma of left kidney, initial encounter [O59.747H]    Patient Goals/Plan/Treatment Preferences: Met with Fermin Jones. She currently resides at home with friends. She is independent. Plan is to return home at discharge. She denies need for DME and declines HH at this time. Will monitor. Transportation/Food Security/Housekeeping Addressed: No issues identified. Roney Molina RN  Case Management Department

## 2023-01-16 NOTE — PROGRESS NOTES
Urology Progress Note      Impression/Plan:  Left Renal Subcapsular Hematoma  -s/p cystoscopy bladder botox 200 units left stent placement with Dr. Abdiel Alcazar on 2022. Still required definitive stone treatment  -Will check a bladder scan/PVR.  - S/P Cystoscopy, Left Ureteroscopy, Laser Lithotripsy, Basket Retrieval, Left Stent on .  - Hematoma measuring 2.3 cm. Patient is hemodynamically stable. Patient admitted for monitoring and pain control.   - Percocet primary pain medication. Dilaudid 1 mg Q4h PRN for breakthrough pain.  -Prn Zofran for nausea  -Prn miralax for constipation   - Continue Ciprofloxacin.   - Will monitor H/H, BMP, and pain today and re-evaluate    -Hemoglobin trend 1/15: 12.9, [de-identified]  -Hemoglobin trend : 11.2, 10.8   -Creatinine remains stable at 0.7   -Sodium: 134    -stent to be removed tomorrow (2023)  -case discussed with Dr. Abdiel Alcazar      Chief Complaint: Left Flank Pain    Surgery:   S/P cystoscopy left ureteroscopy, laser lithotripsy with left ureteral stent placement by Dr. Abdiel Alcazar on 23    Subjective: Patient presented to [facility]2023 with concerns of left flank pain. Patient noted pain despite the use of pain medication. At presentation, pain was rated as a 10/10. CT findings 1/15:   Left renal subcapsular hematoma measuring up to 2.3 cm in thickness,    likely secondary to lithotripsy. Properly positioned left ureteral stent    without hydronephrosis. Urine: Denies gross hematuria and incomplete emptying  Pain:  8/10  N/V:  Reports nausea and vomiting. Episode of emesis while in the room. Chest pain:  Denies  SOB:  Denies  Calf pain:  Denies  Flatus/BM: Reports Constipation  Diet:  ADULT DIET;  Regular   Activity:  Ambulate patient         Vitals:  BP (!) 145/67   Pulse 76   Temp 97.9 °F (36.6 °C) (Oral)   Resp 16   Ht 5' 4\" (1.626 m)   Wt 154 lb 6.4 oz (70 kg)   SpO2 92%   BMI 26.50 kg/m²   Temp  Av.6 °F (36.4 °C)  Min: 97.4 °F (36.3 °C)  Max: 97.9 °F (36.6 °C)  No intake or output data in the 24 hours ending 01/16/23 0724    Social History     Socioeconomic History    Marital status:      Spouse name: Not on file    Number of children: 3    Years of education: 12    Highest education level: Not on file   Occupational History    Occupation: on disability     Employer: [employer]   Tobacco Use    Smoking status: Every Day     Packs/day: 0.50     Years: 30.00     Pack years: 15.00     Types: Cigarettes     Start date: 4/22/1977    Smokeless tobacco: Never    Tobacco comments:     Trying to quit   Vaping Use    Vaping Use: Never used   Substance and Sexual Activity    Alcohol use: No     Comment: none for 2 years    Drug use: Not Currently     Frequency: 1.0 times per week     Types: Cocaine    Sexual activity: Not Currently   Other Topics Concern    Not on file   Social History Narrative    Not on file     Social Determinants of Health     Financial Resource Strain: Not on file   Food Insecurity: Not on file   Transportation Needs: Not on file   Physical Activity: Not on file   Stress: Not on file   Social Connections: Not on file   Intimate Partner Violence: Not on file   Housing Stability: Not on file     Family History   Problem Relation Age of Onset    Cancer Mother     Heart Disease Mother     High Blood Pressure Mother     High Cholesterol Mother     Cancer Father         brain    Depression Father     Heart Disease Father     High Cholesterol Father     Mental Illness Father     Cancer Sister     Heart Attack Sister     Heart Disease Maternal Uncle     High Cholesterol Maternal Uncle     Diabetes Maternal Grandmother     Heart Disease Maternal Grandmother     High Cholesterol Maternal Grandmother     Early Death Maternal Grandfather     Heart Disease Maternal Grandfather     High Cholesterol Maternal Grandfather     Stroke Maternal Grandfather     Heart Disease Paternal Grandmother     High Cholesterol Paternal Grandmother     Heart Disease Paternal Grandfather     High Cholesterol Paternal Grandfather     Colon Cancer Neg Hx     Inflam Bowel Dis Neg Hx      Allergies   Allergen Reactions    Seasonal Other (See Comments)     sneezing         Constitutional: Alert and oriented times x3, no acute distress, and cooperative to examination with appropriate mood and affect. HEENT:   Head:         Normocephalic and atraumatic. Mucous membranes are normal.   Eyes:         EOM are normal. No scleral icterus. Nose:    The external appearance of the nose is normal  Ears: The ears appear normal to external inspection. Cardiovascular:       Normal rate, regular rhythm. Pulmonary/Chest:  Normal respiratory rate and rhythm. No use of accessory muscles. Lungs clear bilaterally. Neurological:    Alert and oriented.      Labs:  WBC:    Lab Results   Component Value Date/Time    WBC 7.1 01/15/2023 01:35 AM     Hemoglobin/Hematocrit:    Lab Results   Component Value Date/Time    HGB 10.8 01/16/2023 06:14 AM    HCT 33.6 01/16/2023 06:14 AM     BMP:    Lab Results   Component Value Date/Time     01/16/2023 06:14 AM    K 4.0 01/16/2023 06:14 AM    K 4.0 11/08/2022 07:48 AM     01/16/2023 06:14 AM    CO2 24 01/16/2023 06:14 AM    BUN 11 01/16/2023 06:14 AM    LABALBU 3.8 01/15/2023 01:35 AM    LABALBU 4.3 04/19/2012 10:25 AM    CREATININE 0.7 01/16/2023 06:14 AM    CALCIUM 8.9 01/16/2023 06:14 AM    GFRAA >60 02/25/2019 08:53 PM    LABGLOM >60 01/16/2023 06:14 AM       Abigail Ma PA-C  01/16/23 7:24 AM  Urology

## 2023-01-16 NOTE — PLAN OF CARE
Problem: Pain  Goal: Verbalizes/displays adequate comfort level or baseline comfort level  Outcome: Progressing   Pain Assessment: 0-10  Pain Level: 7   Patient's Stated Pain Goal: 5   Is pain goal met at this time? No     Non-Pharmaceutical Pain Intervention(s): Rest, Repositioned    Problem: ABCDS Injury Assessment  Goal: Absence of physical injury  Outcome: Progressing   Patient remains free from injury. Safety precautions remain in place. Problem: Skin/Tissue Integrity - Adult  Goal: Skin integrity remains intact  Outcome: Progressing  Flowsheets (Taken 1/16/2023 0905)  Skin Integrity Remains Intact: Monitor for areas of redness and/or skin breakdown   No skin breakdown this shift. Patient able to turn per self. Patients states understanding of repositioning every two hours. Problem: Musculoskeletal - Adult  Goal: Return mobility to safest level of function  Outcome: Progressing  Flowsheets (Taken 1/16/2023 0905)  Return Mobility to Safest Level of Function: Assess patient stability and activity tolerance for standing, transferring and ambulating with or without assistive devices   Patient able to walk independently without difficulty. Problem: Genitourinary - Adult  Goal: Absence of urinary retention  Outcome: Progressing  Flowsheets (Taken 1/16/2023 0905)  Absence of urinary retention: Assess patients ability to void and empty bladder   Patient voiding adequate amounts with some discomfort. Bladder scan done per order post void only 11 ml. Problem: Infection - Adult  Goal: Absence of infection during hospitalization  Outcome: Progressing  Flowsheets (Taken 1/16/2023 0905)  Absence of infection during hospitalization:   Assess and monitor for signs and symptoms of infection   Monitor lab/diagnostic results   Administer medications as ordered   Patient remains afebrile. Antibiotics administered per order.     Problem: Infection - Adult  Goal: Absence of infection at discharge  Outcome: Progressing  Flowsheets (Taken 1/16/2023 0905)  Absence of infection at discharge:   Assess and monitor for signs and symptoms of infection   Monitor lab/diagnostic results   Administer medications as ordered     Problem: Infection - Adult  Goal: Absence of fever/infection during anticipated neutropenic period  Outcome: Progressing  Flowsheets (Taken 1/16/2023 0905)  Absence of fever/infection during anticipated neutropenic period: Monitor white blood cell count     Problem: Metabolic/Fluid and Electrolytes - Adult  Goal: Electrolytes maintained within normal limits  Outcome: Progressing  Flowsheets (Taken 1/16/2023 0905)  Electrolytes maintained within normal limits: Monitor labs and assess patient for signs and symptoms of electrolyte imbalances     Problem: Discharge Planning  Goal: Discharge to home or other facility with appropriate resources  Outcome: Progressing  Flowsheets (Taken 1/16/2023 0905)  Discharge to home or other facility with appropriate resources:   Identify barriers to discharge with patient and caregiver   Arrange for needed discharge resources and transportation as appropriate   Discharge plan is home. Problem: Chronic Conditions and Co-morbidities  Goal: Patient's chronic conditions and co-morbidity symptoms are monitored and maintained or improved  Outcome: Progressing  Flowsheets (Taken 1/16/2023 0905)  Care Plan - Patient's Chronic Conditions and Co-Morbidity Symptoms are Monitored and Maintained or Improved: Monitor and assess patient's chronic conditions and comorbid symptoms for stability, deterioration, or improvement     Care plan reviewed with patient. Patient verbalize understanding of the plan of care and contribute to goal setting.

## 2023-01-16 NOTE — FLOWSHEET NOTE
01/16/23 1607   Treatment Team Notification   Reason for Communication Review case   Team Member Name [address] [facility]   Treatment Team Role Physician Assistant   Method of Communication Secure Message   Response Waiting for response   Notification Time [phone]   Notified Bladder scan PVR 11 ml

## 2023-01-17 VITALS
HEART RATE: 64 BPM | RESPIRATION RATE: 22 BRPM | TEMPERATURE: 97.8 F | WEIGHT: 154.32 LBS | OXYGEN SATURATION: 94 % | SYSTOLIC BLOOD PRESSURE: 167 MMHG | DIASTOLIC BLOOD PRESSURE: 72 MMHG | HEIGHT: 64 IN | BODY MASS INDEX: 26.35 KG/M2

## 2023-01-17 LAB
ANION GAP SERPL CALCULATED.3IONS-SCNC: 11 MEQ/L (ref 8–16)
BUN BLDV-MCNC: 5 MG/DL (ref 7–22)
CALCIUM SERPL-MCNC: 8.9 MG/DL (ref 8.5–10.5)
CHLORIDE BLD-SCNC: 103 MEQ/L (ref 98–111)
CO2: 22 MEQ/L (ref 23–33)
CREAT SERPL-MCNC: 0.6 MG/DL (ref 0.4–1.2)
GFR SERPL CREATININE-BSD FRML MDRD: > 60 ML/MIN/1.73M2
GLUCOSE BLD-MCNC: 116 MG/DL (ref 70–108)
HCT VFR BLD CALC: 31 % (ref 37–47)
HCT VFR BLD CALC: 31 % (ref 37–47)
HCT VFR BLD CALC: 31.5 % (ref 37–47)
HEMOGLOBIN: 10.1 GM/DL (ref 12–16)
HEMOGLOBIN: 10.1 GM/DL (ref 12–16)
HEMOGLOBIN: 10.5 GM/DL (ref 12–16)
POTASSIUM SERPL-SCNC: 4 MEQ/L (ref 3.5–5.2)
SODIUM BLD-SCNC: 136 MEQ/L (ref 135–145)

## 2023-01-17 PROCEDURE — 6360000002 HC RX W HCPCS

## 2023-01-17 PROCEDURE — 6370000000 HC RX 637 (ALT 250 FOR IP)

## 2023-01-17 PROCEDURE — 36415 COLL VENOUS BLD VENIPUNCTURE: CPT

## 2023-01-17 PROCEDURE — 6370000000 HC RX 637 (ALT 250 FOR IP): Performed by: UROLOGY

## 2023-01-17 PROCEDURE — 85018 HEMOGLOBIN: CPT

## 2023-01-17 PROCEDURE — 2580000003 HC RX 258

## 2023-01-17 PROCEDURE — 80048 BASIC METABOLIC PNL TOTAL CA: CPT

## 2023-01-17 PROCEDURE — 85014 HEMATOCRIT: CPT

## 2023-01-17 RX ORDER — SENNA AND DOCUSATE SODIUM 50; 8.6 MG/1; MG/1
2 TABLET, FILM COATED ORAL DAILY PRN
Qty: 60 TABLET | Refills: 0 | Status: SHIPPED | OUTPATIENT
Start: 2023-01-17 | End: 2023-02-16

## 2023-01-17 RX ORDER — POLYETHYLENE GLYCOL 3350 17 G/17G
17 POWDER, FOR SOLUTION ORAL DAILY PRN
Qty: 527 G | Refills: 1 | Status: SHIPPED | OUTPATIENT
Start: 2023-01-17 | End: 2023-02-16

## 2023-01-17 RX ORDER — HYDROCODONE BITARTRATE AND ACETAMINOPHEN 5; 325 MG/1; MG/1
1 TABLET ORAL EVERY 6 HOURS PRN
Qty: 12 TABLET | Refills: 0 | Status: SHIPPED | OUTPATIENT
Start: 2023-01-17 | End: 2023-01-20

## 2023-01-17 RX ORDER — HYDROCODONE BITARTRATE AND ACETAMINOPHEN 5; 325 MG/1; MG/1
1 TABLET ORAL EVERY 4 HOURS PRN
Status: DISCONTINUED | OUTPATIENT
Start: 2023-01-17 | End: 2023-01-17 | Stop reason: HOSPADM

## 2023-01-17 RX ORDER — SENNA AND DOCUSATE SODIUM 50; 8.6 MG/1; MG/1
2 TABLET, FILM COATED ORAL DAILY PRN
Status: DISCONTINUED | OUTPATIENT
Start: 2023-01-17 | End: 2023-01-17 | Stop reason: HOSPADM

## 2023-01-17 RX ORDER — HYDROCODONE BITARTRATE AND ACETAMINOPHEN 5; 325 MG/1; MG/1
2 TABLET ORAL EVERY 4 HOURS PRN
Status: DISCONTINUED | OUTPATIENT
Start: 2023-01-17 | End: 2023-01-17 | Stop reason: HOSPADM

## 2023-01-17 RX ADMIN — HYDROCODONE BITARTRATE AND ACETAMINOPHEN 2 TABLET: 5; 325 TABLET ORAL at 11:35

## 2023-01-17 RX ADMIN — AMOXICILLIN AND CLAVULANATE POTASSIUM 1 TABLET: 500; 125 TABLET, FILM COATED ORAL at 05:01

## 2023-01-17 RX ADMIN — AMOXICILLIN AND CLAVULANATE POTASSIUM 1 TABLET: 500; 125 TABLET, FILM COATED ORAL at 16:02

## 2023-01-17 RX ADMIN — PANTOPRAZOLE SODIUM 40 MG: 40 TABLET, DELAYED RELEASE ORAL at 05:01

## 2023-01-17 RX ADMIN — HYDROCODONE BITARTRATE AND ACETAMINOPHEN 2 TABLET: 5; 325 TABLET ORAL at 16:02

## 2023-01-17 RX ADMIN — CIPROFLOXACIN 500 MG: 500 TABLET, FILM COATED ORAL at 09:40

## 2023-01-17 RX ADMIN — HYDROMORPHONE HYDROCHLORIDE 1 MG: 1 INJECTION, SOLUTION INTRAMUSCULAR; INTRAVENOUS; SUBCUTANEOUS at 05:01

## 2023-01-17 RX ADMIN — OXYCODONE AND ACETAMINOPHEN 1 TABLET: 5; 325 TABLET ORAL at 07:49

## 2023-01-17 RX ADMIN — SODIUM CHLORIDE: 9 INJECTION, SOLUTION INTRAVENOUS at 03:07

## 2023-01-17 RX ADMIN — OXYCODONE AND ACETAMINOPHEN 1 TABLET: 5; 325 TABLET ORAL at 03:04

## 2023-01-17 ASSESSMENT — PAIN DESCRIPTION - ORIENTATION
ORIENTATION: LEFT
ORIENTATION: LEFT
ORIENTATION: MID
ORIENTATION: LEFT
ORIENTATION: LOWER

## 2023-01-17 ASSESSMENT — PAIN DESCRIPTION - LOCATION
LOCATION: ABDOMEN
LOCATION: FLANK
LOCATION: ABDOMEN;FLANK
LOCATION: FLANK
LOCATION: ABDOMEN
LOCATION: ABDOMEN;FLANK
LOCATION: FLANK

## 2023-01-17 ASSESSMENT — PAIN SCALES - GENERAL
PAINLEVEL_OUTOF10: 7
PAINLEVEL_OUTOF10: 10
PAINLEVEL_OUTOF10: 7
PAINLEVEL_OUTOF10: 9
PAINLEVEL_OUTOF10: 10
PAINLEVEL_OUTOF10: 9
PAINLEVEL_OUTOF10: 8

## 2023-01-17 ASSESSMENT — PAIN DESCRIPTION - DESCRIPTORS
DESCRIPTORS: ACHING

## 2023-01-17 NOTE — PROGRESS NOTES
Physician Progress Note      Arnoldo Fisher  CSN #:                  [id number]  :                       1955  ADMIT DATE:       1/15/2023 1:15 AM  DISCH DATE:  RESPONDING  PROVIDER #:        Abigail Ruiz          QUERY TEXT:    Pt admitted with  and underwent cystoscopy with stent placement and laser   lithotripsy, noted documentation of L renal subcapsular hematoma on arrival.?   If possible, please document in progress notes and discharge summary the   relationship if any between the L renal hematoma and the surgery: The medical record reflects the following:  Risk Factors: recent urology procedure  Clinical Indicators: having pain uncontrolled by pain medication since   procedure, presents with L subcapsular hematoma  Treatment: Labs, imaging, monitoring, pain control  Options provided:  -- L renal hematoma?is due to the procedure  -- L renal hematoma is not due to the procedure, but is due to other   incidental risk factor, Please specify other incidental risk factor. -- Other - I will add my own diagnosis  -- Disagree - Not applicable / Not valid  -- Disagree - Clinically unable to determine / Unknown  -- Refer to Clinical Documentation Reviewer    PROVIDER RESPONSE TEXT:    Provider is clinically unable to determine a response to this query.     Query created by: Estiven Chan on 2023 12:58 PM      Electronically signed by:  Abigail Ruiz 2023 9:10 AM

## 2023-01-17 NOTE — CARE COORDINATION
1/17/23, 7:54 AM EST    DISCHARGE ON GOING EVALUATION    Tracy Ramsey day: 2  Location: -28/028-A Reason for admit: Renal hematoma, left, initial encounter [S37.012A]  Hematoma of left kidney, initial encounter [V04.388O]   Procedure: 1/15 CR Abdomen Pelvis WO Contrast Impression:   Left renal subcapsular hematoma measuring up to 2.3 cm in thickness,   likely secondary to lithotripsy. Properly positioned left ureteral stent   without hydronephrosis  Barriers to Discharge: Managing hematoma at kidney. IVF at 100/hr. PO Augmentin and Cipro. Percocet for discomfort. Ambulate. Hgb stable at 10.1. PCP: BA Zarate CNP  Readmission Risk Score: 16.7%  Patient Goals/Plan/Treatment Preferences: Joel Plummer plans to return home with friends as prior to admission.

## 2023-01-17 NOTE — DISCHARGE INSTRUCTIONS
Follow up with Dr. Lissa Li  Please go to the ED should you develop fever, chills, nausea, vomiting, chest pain, SOB, calf pain, feelings of incomplete emptying, or should you otherwise feel that you need evaluated. Finish antibiotics     Pt ok to discharge home in good condition  No heavy lifting, >10 lbs for today  Pt should avoid strenuous activity for today  Pt should walk moderately at home  Pt ok to shower   Pt may resume diet as tolerated  Pt should take Rx as directed  No driving while on narcotics  Please call attending physician or hospital  with questions  Call or Present to ED if fever (> 101F), intractable nausea vomiting or pain.   Rx in chart     Pt should follow up with Dr. Lissa Li, call to confirm appointment

## 2023-01-17 NOTE — PROGRESS NOTES
Urology Progress Note      Impression/Plan:  Ok to remove stent  Pt is requesting Dilaudid every 4 hrs, will attempt to decrease IV medication pain usage  Stop IV dilaudid, stop IV fluids  Change Percocet to Norco  Having more pain in bladder, possibly from stent  HGB stable  Tolerating diet, having BM    Left Renal Subcapsular Hematoma  -s/p cystoscopy bladder botox 200 units left stent placement with Dr. Duane Islam on 12/27/2022. Still required definitive stone treatment  -Low PVR  - S/P Cystoscopy, Left Ureteroscopy, Laser Lithotripsy, Basket Retrieval, Left Stent on 1/12.  - Hematoma measuring 2.3 cm. Patient is hemodynamically stable. Patient admitted for monitoring and pain control.   -Prn Zofran for nausea  -Prn miralax for constipation   - stop Cipro, cont Augmentin   - Will monitor H/H, BMP, and pain today and re-evaluate    -Hemoglobin trend 1/15: 12.9, [de-identified]  -Hemoglobin trend 1/16: 11.2, 10.8   -Creatinine remains stable at 0.6   -Sodium: 136    Chief Complaint: Left Flank Pain    Surgery:   S/P cystoscopy left ureteroscopy, laser lithotripsy with left ureteral stent placement by Dr. Duane Islam on 1/12/23    Subjective: Patient [facility] to [facility] with concerns of left flank pain. Patient noted pain despite the use of pain medication. At presentation, pain was rated as a 10/10. CT findings 1/15:   Left renal subcapsular hematoma measuring up to 2.3 cm in thickness,    likely secondary to lithotripsy. Properly positioned left ureteral stent    without hydronephrosis. Urine: Denies gross hematuria and incomplete emptying  Pain:  8/10  N/V:  Reports nausea and vomiting. Episode of emesis while in the room. Chest pain:  Denies  SOB:  Denies  Calf pain:  Denies  Flatus/BM: Reports Constipation  Diet:  ADULT DIET;  Regular   Activity:  Ambulate patient         Vitals:  BP (!) 174/82   Pulse 80   Temp 98 °F (36.7 °C) (Oral)   Resp 16   Ht 5' 4\" (1.626 m)   Wt 154 lb 5.2 oz (70 kg) SpO2 95%   BMI 26.49 kg/m²   Temp  Av.9 °F (36.6 °C)  Min: 97.8 °F (36.6 °C)  Max: 98 °F (36.7 °C)    Intake/Output Summary (Last 24 hours) at 2023 0950  Last data filed at 2023 1300  Gross per 24 hour   Intake 360 ml   Output --   Net 360 ml       Social History     Socioeconomic History    Marital status:      Spouse name: Not on file    Number of children: 3    Years of education: 12    Highest education level: Not on file   Occupational History    Occupation: on disability     Employer: [employer]   Tobacco Use    Smoking status: Every Day     Packs/day: 0.50     Years: 30.00     Pack years: 15.00     Types: Cigarettes     Start date: 1977    Smokeless tobacco: Never    Tobacco comments:     Trying to quit   Vaping Use    Vaping Use: Never used   Substance and Sexual Activity    Alcohol use: No     Comment: none for 2 years    Drug use: Not Currently     Frequency: 1.0 times per week     Types: Cocaine    Sexual activity: Not Currently   Other Topics Concern    Not on file   Social History Narrative    Not on file     Social Determinants of Health     Financial Resource Strain: Not on file   Food Insecurity: Not on file   Transportation Needs: Not on file   Physical Activity: Not on file   Stress: Not on file   Social Connections: Not on file   Intimate Partner Violence: Not on file   Housing Stability: Not on file     Family History   Problem Relation Age of Onset    Cancer Mother     Heart Disease Mother     High Blood Pressure Mother     High Cholesterol Mother     Cancer Father         brain    Depression Father     Heart Disease Father     High Cholesterol Father     Mental Illness Father     Cancer Sister     Heart Attack Sister     Heart Disease Maternal Uncle     High Cholesterol Maternal Uncle     Diabetes Maternal Grandmother     Heart Disease Maternal Grandmother     High Cholesterol Maternal Grandmother     Early Death Maternal Grandfather     Heart  Disease Maternal Grandfather     High Cholesterol Maternal Grandfather     Stroke Maternal Grandfather     Heart Disease Paternal Grandmother     High Cholesterol Paternal Grandmother     Heart Disease Paternal Grandfather     High Cholesterol Paternal Grandfather     Colon Cancer Neg Hx     Inflam Bowel Dis Neg Hx      Allergies   Allergen Reactions    Seasonal Other (See Comments)     sneezing         Constitutional: Alert and oriented times x3, no acute distress, and cooperative to examination with appropriate mood and affect. HEENT:   Head:         Normocephalic and atraumatic. Mucous membranes are normal.   Eyes:         EOM are normal. No scleral icterus. Nose:    The external appearance of the nose is normal  Ears: The ears appear normal to external inspection. Cardiovascular:       Normal rate, regular rhythm. Pulmonary/Chest:  Normal respiratory rate and rhythm. No use of accessory muscles. Lungs clear bilaterally. Neurological:    Alert and oriented.      Labs:  WBC:    Lab Results   Component Value Date/Time    WBC 7.1 01/15/2023 01:35 AM     Hemoglobin/Hematocrit:    Lab Results   Component Value Date/Time    HGB 10.1 01/17/2023 05:51 AM    HCT 31.0 01/17/2023 05:51 AM     BMP:    Lab Results   Component Value Date/Time     01/17/2023 05:51 AM    K 4.0 01/17/2023 05:51 AM    K 4.0 11/08/2022 07:48 AM     01/17/2023 05:51 AM    CO2 22 01/17/2023 05:51 AM    BUN 5 01/17/2023 05:51 AM    LABALBU 3.8 01/15/2023 01:35 AM    LABALBU 4.3 04/19/2012 10:25 AM    CREATININE 0.6 01/17/2023 05:51 AM    CALCIUM 8.9 01/17/2023 05:51 AM    GFRAA >60 02/25/2019 08:53 PM    LABGLOM >60 01/17/2023 05:51 AM       Sol Netters, APRN - CNP  01/17/23 9:50 AM  Urology

## 2023-01-17 NOTE — DISCHARGE SUMMARY
DISCHARGE SUMMARY NOTE:      Patient Identification  Nicole Pritchard is a 74 y.o. female. :  1955  Admit Date:  1/15/2023    Discharge date:   No discharge date for patient encounter.                                    Disposition: Discharge to home in good condition     Discharge Diagnoses:   Patient Active Problem List   Diagnosis    GERD (gastroesophageal reflux disease)    Colon polyps    Chest pain, atypical    Gastroenteritis    Pneumonia    Shoulder pain    History of pulmonary embolism    COPD (chronic obstructive pulmonary disease) (HCC)    Hypoglycemia    Anticoagulated on Coumadin    Bipolar disorder (HCC)    Cannabis abuse    Cocaine abuse ([address]    Alcohol dependence in remission ([address]    Cholecystitis with cholelithiasis    GI bleed    Erosive esophagitis    Hypokalemia    HTN (hypertension), benign    Bipolar 1 disorder (HCC)    Chronic pain    Hiatal hernia    Chest pain    Vomiting    Erosive gastritis    H pylori ulcer    Hematemesis    History of Helicobacter pylori infection    Intractable abdominal pain    Intractable vomiting with nausea    Epigastric abdominal pain    Acute blood loss anemia    Chronic chest pain    Hyponatremia    Metabolic acidosis    Essential hypertension    COPD with acute exacerbation (HCC)    Hypothyroidism    History of DVT (deep vein thrombosis)    Depression    Tobacco abuse    Hematemesis with nausea    Hypoxia    Pleural effusion, bilateral    Suicidal ideation    Bipolar disorder, in partial remission, most recent episode depressed ([address]    Bipolar II disorder ([address]    Diastolic dysfunction    Angina, class II ([address]    Unstable angina (HCC)    Dyslipidemia    Electrolyte abnormality    COPD exacerbation (HCC)    Acute respiratory insufficiency    Chronic diastolic heart failure (HCC)    Tobacco use disorder    Nasal septal deviation    Hypertrophy of left inferior nasal turbinate    Rhinogenic headache    Asymmetrical sensorineural hearing loss Tinnitus, left ear    Psychosis (HCC)    Substance-induced psychotic disorder (HCC)    Bipolar 1 disorder, depressed ([address]    Polysubstance abuse ([address]    Major depressive disorder, recurrent (HCC)    Stroke-like symptom    Right arm weakness    Delirium    Paresthesias    Depression, major, recurrent ([address]    Depression with suicidal ideation    Amnesia    Bipolar disorder, current episode mixed, moderate ([address]    Jfvrbrkjn-Tiftcvo-Epdelj disease    Carpal tunnel syndrome of left wrist    Change of, skin texture    Chronic midline low back pain without sciatica    Coronary vasospasm (HCC)    Derangement of knee    Disorder associated with Helicobacter species    Disturbance of skin sensation    Encounter for surgical follow-up care    Endometriosis    Environmental and seasonal allergies    Glaucoma suspect    History of arterial ischemic stroke    Mixed hyperlipidemia    Large liver    Lesion of ulnar nerve    Nasal congestion    Nicotine dependence    Pancreatic insufficiency    Primary osteoarthritis involving multiple joints    Sciatica    Sprain of right foot    Preoperative state    Type 2 diabetes mellitus without complication, without long-term current use of insulin (HCC)    Urinary incontinence, overflow    Elevated lactic acid level    Acute renal failure with acute cortical necrosis (HCC)    Pyelonephritis of left kidney    Pyelonephritis    Septicemia ([address]    Acute exacerbation of chronic obstructive pulmonary disease (COPD) ([address]    Renal hematoma, left         Consults: none    Surgery: s/p cystoscopy bladder botox 200 units left stent placement with Dr. Vermell Hatchet on 12/27/2022. Still required definitive stone treatment  - S/P Cystoscopy, Left Ureteroscopy, Laser Lithotripsy, Basket Retrieval, Left Stent on 1/12. Patient Instructions:    Activity: no heavy lifting, pushing, pulling for 6 weeks, no driving for 2 weeks or while on analgesics  Diet: As tolerated  Follow-up with  [facility] course: Patient presented with a left 2.3 cm hematoma and flank pain. Her stent has been removed and her hemoglobin remains stable. Patient is tolerating diet, urinating adequately on her own, pain is minimal, ambulating well with assistance.     Time spent for discharge 31 minutes     Sunshine Denton PA-C  1/17/2023 4:20 PM

## 2023-01-17 NOTE — PROGRESS NOTES
Patient pain controlled with PO medications  Ambulating  Tolerating diet  C/o some constipation  Otherwise denies concerns

## 2023-01-17 NOTE — PLAN OF CARE
Problem: Pain  Goal: Verbalizes/displays adequate comfort level or baseline comfort level  Outcome: Adequate for Discharge     Problem: ABCDS Injury Assessment  Goal: Absence of physical injury  Outcome: Adequate for Discharge     Problem: Skin/Tissue Integrity - Adult  Goal: Skin integrity remains intact  Outcome: Adequate for Discharge     Problem: Musculoskeletal - Adult  Goal: Return mobility to safest level of function  Outcome: Adequate for Discharge     Problem: Genitourinary - Adult  Goal: Absence of urinary retention  Outcome: Adequate for Discharge     Problem: Infection - Adult  Goal: Absence of infection during hospitalization  Outcome: Adequate for Discharge     Problem: Infection - Adult  Goal: Absence of infection at discharge  Outcome: Adequate for Discharge     Problem: Infection - Adult  Goal: Absence of fever/infection during anticipated neutropenic period  Outcome: Adequate for Discharge     Problem: Discharge Planning  Goal: Discharge to home or other facility with appropriate resources  Outcome: Adequate for Discharge     Problem: Chronic Conditions and Co-morbidities  Goal: Patient's chronic conditions and co-morbidity symptoms are monitored and maintained or improved  Outcome: Adequate for Discharge

## 2023-01-17 NOTE — PLAN OF CARE
Problem: Discharge Planning  Goal: Discharge to home or other facility with appropriate resources  1/17/2023 0217 by Miracle Reyes RN  Outcome: Progressing   Discharge date is unknown at this time. Problem: Pain  Goal: Verbalizes/displays adequate comfort level or baseline comfort level  1/17/2023 0217 by Miracle Reyes RN  Outcome: Progressing   Pain Assessment: 0-10  Pain Level: 7   Patient's Stated Pain Goal: 5   Is pain goal met at this time? No  Non-Pharmaceutical Pain Intervention(s): Rest     Problem: Skin/Tissue Integrity - Adult  Goal: Skin integrity remains intact  Outcome: Progressing   Skin assessment completed. Patient turned every 2 hours and as needed. No skin breakdown this shift. Problem: Musculoskeletal - Adult  Goal: Return mobility to safest level of function  Outcome: Progressing     Problem: Genitourinary - Adult  Goal: Absence of urinary retention  Outcome: Progressing     Problem: Infection - Adult  Goal: Absence of infection during hospitalization  1/17/2023 0220 by Miracle Reyes RN  Outcome: Progressing   Patient starting Cipro and Augmentin today. Problem: ABCDS Injury Assessment  Goal: Absence of physical injury  1/17/2023 0217 by Miracle Reyes RN  Outcome: Progressing   All fall precautions in place. Bed in low position, alarm activated and appropriate use of call light. Problem: Metabolic/Fluid and Electrolytes - Adult  Goal: Electrolytes maintained within normal limits  Outcome: Progressing        Care plan reviewed with patient. Patient verbalizes understanding of the plan of care and contributes to goal setting.

## 2023-01-19 NOTE — PROGRESS NOTES
Physician Progress Note      PATIENTMaximiano Castleman  CSN #:                  [id number]  :                       1955  ADMIT DATE:       1/15/2023 1:15 AM  DISCH DATE:        2023 7:45 PM  RESPONDING  PROVIDER #:        Ashley Acuna          QUERY TEXT:    Pt admitted with renal hematoma. Pt noted to have drop in hemoglobin. If   possible, please document in the progress notes and discharge summary if you   are evaluating and/or treating any of the following: The medical record reflects the following:  Risk Factors: recent urology procedure  Clinical Indicators: hgb 12.9, hct 38.7 on arrival; dropped to hgb 10.1, hct   31.0  Treatment: Labs, monitoring, IVF  Options provided:  -- Acute blood loss anemia  -- Acute on chronic blood loss anemia  -- Postoperative acute blood loss anemia  -- Dilutional anemia  -- Precipitous drop in Hemoglobin and Hematocrit  -- Other - I will add my own diagnosis  -- Disagree - Not applicable / Not valid  -- Disagree - Clinically unable to determine / Unknown  -- Refer to Clinical Documentation Reviewer    PROVIDER RESPONSE TEXT:    This patient has postoperative acute blood loss anemia.     Query created by: Berlin Bhatti on 2023 7:27 AM      Electronically signed by:  Ashley Acuna 2023 7:32 AM

## 2023-01-20 ENCOUNTER — TELEPHONE (OUTPATIENT)
Dept: UROLOGY | Age: 68
End: 2023-01-20

## 2023-01-20 NOTE — TELEPHONE ENCOUNTER
Will not give more norco.   Will need OV.        The patient should go to the ED should they develop fever, chills, nausea, vomiting, chest pain, SOB, calf pain, feelings of incomplete emptying, or should they otherwise feel they need evaluated

## 2023-01-20 NOTE — TELEPHONE ENCOUNTER
Patient was discharged on Tuesday. She still has a lot of pain on the left side rated 10. Patient ran out of the the norco that was prescribed on Tuesday. She denies fever or chills. Patient underwent cystoscopy left ureteroscopy, left holmium laser lithotripsy and stent exchange 01/12/2023. The stent was removed in the hospital.    She finished the antibiotics. She is having trouble on and off with urination, but can not give more details. Advised if pain is that severe and having problems with urination to present to the ER for evaluation.

## 2023-01-20 NOTE — TELEPHONE ENCOUNTER
Patient stated she is going to the ER since pain medication can not prescribed. After being seen in the ER, she will call for an appointment.

## 2023-02-17 ENCOUNTER — HOSPITAL ENCOUNTER (OUTPATIENT)
Dept: GENERAL RADIOLOGY | Age: 68
Discharge: HOME | End: 2023-02-17
Payer: MEDICARE

## 2023-02-17 ENCOUNTER — HOSPITAL ENCOUNTER (OUTPATIENT)
Age: 68
Discharge: HOME | End: 2023-02-17
Payer: MEDICARE

## 2023-02-17 DIAGNOSIS — N20.0 NEPHROLITHIASIS: ICD-10-CM

## 2023-02-17 DIAGNOSIS — Z01.818 PRE-OP TESTING: ICD-10-CM

## 2023-02-17 LAB
ANION GAP SERPL CALC-SCNC: 10 MEQ/L (ref 8–16)
BASOPHILS ABSOLUTE: 0 THOU/MM3 (ref 0–0.1)
BASOPHILS NFR BLD AUTO: 0.6 %
BUN SERPL-MCNC: 8 MG/DL (ref 7–22)
CALCIUM SERPL-MCNC: 9.7 MG/DL (ref 8.5–10.5)
CHLORIDE SERPL-SCNC: 102 MEQ/L (ref 98–111)
CO2 SERPL-SCNC: 26 MEQ/L (ref 23–33)
CREAT SERPL-MCNC: 0.6 MG/DL (ref 0.4–1.2)
DEPRECATED RDW RBC AUTO: 41.2 FL (ref 35–45)
EOSINOPHIL NFR BLD AUTO: 0.9 %
EOSINOPHILS ABSOLUTE: 0.1 THOU/MM3 (ref 0–0.4)
ERYTHROCYTE [DISTWIDTH] IN BLOOD BY AUTOMATED COUNT: 13.2 % (ref 11.5–14.5)
GFR SERPL CREATININE-BSD FRML MDRD: > 60 ML/MIN/1.73M2
GLUCOSE SERPL-MCNC: 135 MG/DL (ref 70–108)
HCT VFR BLD AUTO: 38.2 % (ref 37–47)
HGB BLD-MCNC: 11.8 GM/DL (ref 12–16)
IMM GRANULOCYTES # BLD AUTO: 0.03 THOU/MM3 (ref 0–0.07)
IMM GRANULOCYTES NFR BLD AUTO: 0.4 %
LYMPHOCYTES ABSOLUTE: 1.5 THOU/MM3 (ref 1–4.8)
LYMPHOCYTES NFR BLD AUTO: 18.4 %
MCH RBC QN AUTO: 26.5 PG (ref 26–33)
MCHC RBC AUTO-ENTMCNC: 30.9 GM/DL (ref 32.2–35.5)
MCV RBC AUTO: 85.8 FL (ref 81–99)
MONOCYTES ABSOLUTE: 0.4 THOU/MM3 (ref 0.4–1.3)
MONOCYTES NFR BLD AUTO: 5.3 %
NEUTROPHILS NFR BLD AUTO: 74.4 %
NRBC BLD AUTO-RTO: 0 /100 WBC
PLATELET # BLD AUTO: 444 THOU/MM3 (ref 130–400)
PMV BLD AUTO: 9.3 FL (ref 9.4–12.4)
POTASSIUM SERPL-SCNC: 4 MEQ/L (ref 3.5–5.2)
RBC # BLD AUTO: 4.45 MILL/MM3 (ref 4.2–5.4)
SEGMENTED NEUTROPHILS ABSOLUTE COUNT: 6.1 THOU/MM3 (ref 1.8–7.7)
SODIUM SERPL-SCNC: 138 MEQ/L (ref 135–145)
WBC # BLD AUTO: 8.2 THOU/MM3 (ref 4.8–10.8)

## 2023-02-17 PROCEDURE — 87186 SC STD MICRODIL/AGAR DIL: CPT

## 2023-02-17 PROCEDURE — 87086 URINE CULTURE/COLONY COUNT: CPT

## 2023-02-17 PROCEDURE — 87077 CULTURE AEROBIC IDENTIFY: CPT

## 2023-02-17 PROCEDURE — 80048 BASIC METABOLIC PNL TOTAL CA: CPT

## 2023-02-17 PROCEDURE — 85025 COMPLETE CBC W/AUTO DIFF WBC: CPT

## 2023-02-17 PROCEDURE — 74018 RADEX ABDOMEN 1 VIEW: CPT

## 2023-02-17 PROCEDURE — 36415 COLL VENOUS BLD VENIPUNCTURE: CPT

## 2023-02-19 LAB
BACTERIA UR CULT: ABNORMAL
ORGANISM: ABNORMAL

## 2023-02-21 DIAGNOSIS — J44.9 STAGE 2 MODERATE COPD BY GOLD CLASSIFICATION (HCC): ICD-10-CM

## 2023-02-21 DIAGNOSIS — J41.1 MUCOPURULENT CHRONIC BRONCHITIS (HCC): ICD-10-CM

## 2023-02-22 RX ORDER — FLUTICASONE FUROATE, UMECLIDINIUM BROMIDE AND VILANTEROL TRIFENATATE 100; 62.5; 25 UG/1; UG/1; UG/1
POWDER RESPIRATORY (INHALATION)
Qty: 60 EACH | Refills: 0 | OUTPATIENT
Start: 2023-02-22

## 2023-03-16 NOTE — PROGRESS NOTES
-- Surgery canceled due to positive drug test.  Dr Tiera Cunningham spoke to justo. Nazanin Dick

## 2023-04-04 ENCOUNTER — HOSPITAL ENCOUNTER (EMERGENCY)
Age: 68
Discharge: HOME | End: 2023-04-04
Payer: MEDICARE

## 2023-04-04 ENCOUNTER — APPOINTMENT (OUTPATIENT)
Dept: CT IMAGING | Age: 68
End: 2023-04-04
Payer: MEDICARE

## 2023-04-04 VITALS
WEIGHT: 147 LBS | OXYGEN SATURATION: 94 % | HEART RATE: 68 BPM | HEIGHT: 64 IN | SYSTOLIC BLOOD PRESSURE: 112 MMHG | RESPIRATION RATE: 16 BRPM | BODY MASS INDEX: 25.1 KG/M2 | TEMPERATURE: 99.1 F | DIASTOLIC BLOOD PRESSURE: 70 MMHG

## 2023-04-04 DIAGNOSIS — S37.019A PERINEPHRIC HEMATOMA: ICD-10-CM

## 2023-04-04 DIAGNOSIS — R10.32 LEFT LOWER QUADRANT ABDOMINAL PAIN: ICD-10-CM

## 2023-04-04 DIAGNOSIS — K59.00 CONSTIPATION, UNSPECIFIED CONSTIPATION TYPE: Primary | ICD-10-CM

## 2023-04-04 LAB
ALBUMIN SERPL BCG-MCNC: 3.8 G/DL (ref 3.5–5.1)
ALP SERPL-CCNC: 91 U/L (ref 38–126)
ALT SERPL W/O P-5'-P-CCNC: 13 U/L (ref 11–66)
ANION GAP SERPL CALC-SCNC: 10 MEQ/L (ref 8–16)
AST SERPL-CCNC: 13 U/L (ref 5–40)
BASOPHILS ABSOLUTE: 0.1 THOU/MM3 (ref 0–0.1)
BASOPHILS NFR BLD AUTO: 0.5 %
BILIRUB SERPL-MCNC: 0.2 MG/DL (ref 0.3–1.2)
BUN SERPL-MCNC: 15 MG/DL (ref 7–22)
CALCIUM SERPL-MCNC: 9.9 MG/DL (ref 8.5–10.5)
CHLORIDE SERPL-SCNC: 101 MEQ/L (ref 98–111)
CO2 SERPL-SCNC: 25 MEQ/L (ref 23–33)
CREAT SERPL-MCNC: 1 MG/DL (ref 0.4–1.2)
DEPRECATED RDW RBC AUTO: 46.5 FL (ref 35–45)
EOSINOPHIL NFR BLD AUTO: 0.9 %
EOSINOPHILS ABSOLUTE: 0.1 THOU/MM3 (ref 0–0.4)
ERYTHROCYTE [DISTWIDTH] IN BLOOD BY AUTOMATED COUNT: 15.4 % (ref 11.5–14.5)
GFR SERPL CREATININE-BSD FRML MDRD: > 60 ML/MIN/1.73M2
GLUCOSE SERPL-MCNC: 85 MG/DL (ref 70–108)
HCT VFR BLD AUTO: 35.6 % (ref 37–47)
HEMOCCULT STL QL: NEGATIVE
HGB BLD-MCNC: 11.2 GM/DL (ref 12–16)
IMM GRANULOCYTES # BLD AUTO: 0.06 THOU/MM3 (ref 0–0.07)
IMM GRANULOCYTES NFR BLD AUTO: 0.6 %
LYMPHOCYTES ABSOLUTE: 2.8 THOU/MM3 (ref 1–4.8)
LYMPHOCYTES NFR BLD AUTO: 26.8 %
MCH RBC QN AUTO: 26.8 PG (ref 26–33)
MCHC RBC AUTO-ENTMCNC: 31.5 GM/DL (ref 32.2–35.5)
MCV RBC AUTO: 85.2 FL (ref 81–99)
MONOCYTES ABSOLUTE: 0.6 THOU/MM3 (ref 0.4–1.3)
MONOCYTES NFR BLD AUTO: 5.7 %
NEUTROPHILS NFR BLD AUTO: 65.5 %
NRBC BLD AUTO-RTO: 0 /100 WBC
OSMOLALITY SERPL CALC.SUM OF ELEC: 272 MOSMOL/KG (ref 275–300)
PLATELET # BLD AUTO: 363 THOU/MM3 (ref 130–400)
PMV BLD AUTO: 9.1 FL (ref 9.4–12.4)
POTASSIUM SERPL-SCNC: 4.8 MEQ/L (ref 3.5–5.2)
PROT SERPL-MCNC: 6.7 G/DL (ref 6.1–8)
RBC # BLD AUTO: 4.18 MILL/MM3 (ref 4.2–5.4)
SEGMENTED NEUTROPHILS ABSOLUTE COUNT: 6.9 THOU/MM3 (ref 1.8–7.7)
SODIUM SERPL-SCNC: 136 MEQ/L (ref 135–145)
WBC # BLD AUTO: 10.6 THOU/MM3 (ref 4.8–10.8)

## 2023-04-04 PROCEDURE — 6370000000 HC RX 637 (ALT 250 FOR IP): Performed by: PHYSICIAN ASSISTANT

## 2023-04-04 PROCEDURE — 85025 COMPLETE CBC W/AUTO DIFF WBC: CPT

## 2023-04-04 PROCEDURE — 82272 OCCULT BLD FECES 1-3 TESTS: CPT

## 2023-04-04 PROCEDURE — 96374 THER/PROPH/DIAG INJ IV PUSH: CPT

## 2023-04-04 PROCEDURE — 74177 CT ABD & PELVIS W/CONTRAST: CPT

## 2023-04-04 PROCEDURE — 36415 COLL VENOUS BLD VENIPUNCTURE: CPT

## 2023-04-04 PROCEDURE — 6360000004 HC RX CONTRAST MEDICATION: Performed by: PHYSICIAN ASSISTANT

## 2023-04-04 PROCEDURE — 99285 EMERGENCY DEPT VISIT HI MDM: CPT

## 2023-04-04 PROCEDURE — 6360000002 HC RX W HCPCS: Performed by: PHYSICIAN ASSISTANT

## 2023-04-04 PROCEDURE — 80053 COMPREHEN METABOLIC PANEL: CPT

## 2023-04-04 RX ORDER — BISACODYL 10 MG
10 SUPPOSITORY, RECTAL RECTAL
Status: COMPLETED | OUTPATIENT
Start: 2023-04-04 | End: 2023-04-04

## 2023-04-04 RX ORDER — ACETAMINOPHEN 325 MG/1
650 TABLET ORAL ONCE
Status: COMPLETED | OUTPATIENT
Start: 2023-04-04 | End: 2023-04-04

## 2023-04-04 RX ORDER — KETOROLAC TROMETHAMINE 30 MG/ML
15 INJECTION, SOLUTION INTRAMUSCULAR; INTRAVENOUS ONCE
Status: COMPLETED | OUTPATIENT
Start: 2023-04-04 | End: 2023-04-04

## 2023-04-04 RX ADMIN — BISACODYL 10 MG: 10 SUPPOSITORY RECTAL at 18:57

## 2023-04-04 RX ADMIN — KETOROLAC TROMETHAMINE 15 MG: 30 INJECTION, SOLUTION INTRAMUSCULAR at 17:49

## 2023-04-04 RX ADMIN — ACETAMINOPHEN 650 MG: 325 TABLET ORAL at 19:43

## 2023-04-04 RX ADMIN — IOPAMIDOL 80 ML: 755 INJECTION, SOLUTION INTRAVENOUS at 17:39

## 2023-04-04 ASSESSMENT — ENCOUNTER SYMPTOMS
ABDOMINAL PAIN: 1
RHINORRHEA: 0
BACK PAIN: 0
PHOTOPHOBIA: 0
CONSTIPATION: 1
DIARRHEA: 0
VOMITING: 1
SHORTNESS OF BREATH: 0
SORE THROAT: 0
NAUSEA: 1
COUGH: 0

## 2023-04-04 ASSESSMENT — PAIN SCALES - GENERAL
PAINLEVEL_OUTOF10: 8
PAINLEVEL_OUTOF10: 8
PAINLEVEL_OUTOF10: 5

## 2023-04-04 ASSESSMENT — PAIN DESCRIPTION - LOCATION
LOCATION: ABDOMEN
LOCATION: ABDOMEN

## 2023-04-04 ASSESSMENT — PAIN - FUNCTIONAL ASSESSMENT
PAIN_FUNCTIONAL_ASSESSMENT: 0-10
PAIN_FUNCTIONAL_ASSESSMENT: 0-10

## 2023-04-04 NOTE — ED NOTES
Patient resting in bed. Respirations easy and unlabored. No distress noted. Call light within reach.        Margaret Taylor RN  04/04/23 2105

## 2023-04-04 NOTE — ED PROVIDER NOTES
quadrant abdominal pain          DISPOSITION/PLAN     1. Constipation, unspecified constipation type    2. Perinephric hematoma    3. Left lower quadrant abdominal pain        PATIENT REFERRED TO:  BA Alvarado - CNP  [address]  [phone]    Schedule an appointment as soon as possible for a visit   For recheck of constipation    [address] [facility]  [address]    On Friday 4-7 at 10:15 AM.      DISCHARGE MEDICATIONS:  New Prescriptions    No medications on file       (Please note that portions of this note were completed with a voice recognition program.  Efforts were made to edit the dictations but occasionally words are mis-transcribed.)    Chema Parson PA-C 04/04/23 8:42 PM    MIKEY England PA-C  04/04/23 0613

## 2023-04-04 NOTE — ED NOTES
Patient resting in bed. Respirations easy and unlabored. No distress noted. Call light within reach.        Aurora Rico RN  04/04/23 6621

## 2023-04-04 NOTE — DISCHARGE INSTRUCTIONS
Please take your MiraLAX twice a day until you start having daily productive bowel movements. You then can decrease the MiraLAX to once a day, but please continue for at least another week.

## 2023-04-04 NOTE — ED NOTES
PT ambulated to and from restroom without complication. PT resting in bed. Respirations even and unlabored. Call light in reach.       Kristina Thompson RN  04/04/23 1935

## 2023-04-05 NOTE — ED NOTES
PT resting in bed. PT and VS assessed. Call light in reach. Lights dimmed for comfort. PT denies further needs at this time.      Wilda Hinkle RN  04/04/23 2025

## 2023-04-05 NOTE — ED NOTES
PT resting in bed. PT and VS assessed. Respirations even and unlabored. PT denies needs at this time.      Wilda Foote RN  04/04/23 0080

## 2023-04-07 ENCOUNTER — OFFICE VISIT (OUTPATIENT)
Dept: UROLOGY | Age: 68
End: 2023-04-07

## 2023-04-07 VITALS
WEIGHT: 147 LBS | SYSTOLIC BLOOD PRESSURE: 116 MMHG | BODY MASS INDEX: 25.1 KG/M2 | DIASTOLIC BLOOD PRESSURE: 70 MMHG | TEMPERATURE: 97.5 F | HEIGHT: 64 IN

## 2023-04-07 DIAGNOSIS — K59.00 CONSTIPATION, UNSPECIFIED CONSTIPATION TYPE: ICD-10-CM

## 2023-04-07 DIAGNOSIS — N39.41 URGE INCONTINENCE OF URINE: ICD-10-CM

## 2023-04-07 DIAGNOSIS — S37.019A PERINEPHRIC HEMATOMA: Primary | ICD-10-CM

## 2023-04-07 DIAGNOSIS — N20.0 NEPHROLITHIASIS: ICD-10-CM

## 2023-04-07 DIAGNOSIS — N39.0 URINARY TRACT INFECTION WITHOUT HEMATURIA, SITE UNSPECIFIED: ICD-10-CM

## 2023-04-07 LAB
BILIRUBIN URINE: NEGATIVE
BLOOD URINE, POC: ABNORMAL
CHARACTER, URINE: ABNORMAL
COLOR, URINE: YELLOW
GLUCOSE URINE: NEGATIVE MG/DL
KETONES, URINE: NEGATIVE
LEUKOCYTE CLUMPS, URINE: ABNORMAL
NITRITE, URINE: POSITIVE
PH, URINE: 6.5 (ref 5–9)
POST VOID RESIDUAL (PVR): 50 ML
PROTEIN, URINE: NEGATIVE MG/DL
SPECIFIC GRAVITY, URINE: 1.02 (ref 1–1.03)
UROBILINOGEN, URINE: 0.2 EU/DL (ref 0–1)

## 2023-04-07 RX ORDER — DOCUSATE SODIUM 100 MG/1
100 CAPSULE, LIQUID FILLED ORAL 2 TIMES DAILY PRN
Qty: 30 CAPSULE | Refills: 1 | Status: SHIPPED | OUTPATIENT
Start: 2023-04-07

## 2023-04-07 RX ORDER — POLYETHYLENE GLYCOL 3350 17 G/17G
17 POWDER, FOR SOLUTION ORAL DAILY PRN
Qty: 1530 G | Refills: 1 | Status: SHIPPED | OUTPATIENT
Start: 2023-04-07 | End: 2023-05-07

## 2023-04-07 RX ORDER — FAMOTIDINE 20 MG/1
20 TABLET, FILM COATED ORAL 2 TIMES DAILY
COMMUNITY

## 2023-04-07 RX ORDER — DOCUSATE SODIUM 100 MG/1
100 CAPSULE, LIQUID FILLED ORAL 2 TIMES DAILY PRN
Qty: 30 CAPSULE | Refills: 1 | Status: SHIPPED | OUTPATIENT
Start: 2023-04-07 | End: 2023-04-07

## 2023-04-07 RX ORDER — CLOPIDOGREL BISULFATE 75 MG/1
TABLET ORAL
COMMUNITY
Start: 2023-04-06

## 2023-04-07 RX ORDER — AMOXICILLIN AND CLAVULANATE POTASSIUM 875; 125 MG/1; MG/1
1 TABLET, FILM COATED ORAL 2 TIMES DAILY
Qty: 20 TABLET | Refills: 0 | Status: SHIPPED | OUTPATIENT
Start: 2023-04-07 | End: 2023-04-17

## 2023-04-07 RX ORDER — BUPROPION HYDROCHLORIDE 150 MG/1
TABLET ORAL
COMMUNITY
Start: 2023-04-06

## 2023-04-07 NOTE — PATIENT INSTRUCTIONS
Medications sent to pharmacy:  Colace and Miralax to promote bowel movements- this is very important! Augmenting sent for urinary tract infection- take with food and drink 50-60oz water daily. Follow-up 6 weeks for CT scan results.      The patient should go to the ED if she develops fever, chills, nausea, vomiting, chest pain, SOB, calf pain, feelings of incomplete emptying, or should they otherwise feel they need evaluated

## 2023-04-07 NOTE — PROGRESS NOTES
1 capsule by mouth every morning (before breakfast) (Patient not taking: Reported on 4/7/2023) 30 capsule 0    hydrOXYzine (VISTARIL) 50 MG capsule Take 1 capsule by mouth 3 times daily as needed for Itching or Anxiety (Patient not taking: Reported on 4/7/2023) 90 capsule 0    fluticasone (FLONASE) 50 MCG/ACT nasal spray 1 spray by Each Nostril route 2 times daily (Patient not taking: Reported on 4/7/2023) 1 Bottle 0    escitalopram (LEXAPRO) 20 MG tablet Take 30 mg by mouth daily  (Patient not taking: Reported on 4/7/2023)       No current facility-administered medications for this visit. Past Medical History  Sandrita Felix  has a past medical history of Acute renal failure with acute cortical necrosis ([address] Allergic rhinitis, Arthritis, Bipolar 1 disorder ([address] Blood circulation, collateral, Cancer ([address] Cataract, Colon polyps, COPD (chronic obstructive pulmonary disease) ([address] COPD (chronic obstructive pulmonary disease) ([address] Coronary vasospasm (HCC), Depression, Diverticulitis of colon, GERD (gastroesophageal reflux disease), Glaucoma, Hearing loss, Hiatal hernia, History of arterial ischemic stroke, History of colonoscopy, History of kidney stones, Hx of blood clots, Hyperlipidemia, Hypertension, Liver disease, Pneumonia, Sexual problems, Suicidal thoughts, Thyroid disease, Urinary incontinence, Vomiting, Wears dentures, and Wears glasses. Past Surgical History  The patient  has a past surgical history that includes Mandible fracture surgery (1984); shoulder surgery (2014); Colonoscopy (2011); Dilatation, esophagus; Elbow surgery (Right, 10/7/2004); Rotator cuff repair (2004, 2014); skin biopsy (2006); Cholecystectomy, laparoscopic (6/12/15); Elbow surgery (Bilateral, 2016); Carpal tunnel release (Bilateral, 2016); Hysterectomy (1998); cystoscopy w biopsy of bladder (N/A, 7/13/2020); Stimulator Surgery (N/A, 11/4/2020); Stimulator Surgery (N/A, 11/23/2020); Abdomen surgery;  Cystoscopy (N/A, 2/10/2022);

## 2023-04-09 LAB
BACTERIA UR CULT: ABNORMAL
ORGANISM: ABNORMAL

## 2023-04-24 ENCOUNTER — HOSPITAL ENCOUNTER (OUTPATIENT)
Age: 68
Setting detail: OBSERVATION
Discharge: OTHER INSTITUTION | End: 2023-04-26
Attending: EMERGENCY MEDICINE | Admitting: INTERNAL MEDICINE
Payer: MEDICARE

## 2023-04-24 ENCOUNTER — APPOINTMENT (OUTPATIENT)
Dept: GENERAL RADIOLOGY | Age: 68
End: 2023-04-24
Payer: MEDICARE

## 2023-04-24 DIAGNOSIS — R07.9 CHEST PAIN, UNSPECIFIED TYPE: Primary | ICD-10-CM

## 2023-04-24 LAB
ALBUMIN SERPL BCG-MCNC: 4 G/DL (ref 3.5–5.1)
ALP SERPL-CCNC: 82 U/L (ref 38–126)
ALT SERPL W/O P-5'-P-CCNC: 15 U/L (ref 11–66)
ANION GAP SERPL CALC-SCNC: 9 MEQ/L (ref 8–16)
AST SERPL-CCNC: 17 U/L (ref 5–40)
BACTERIA URNS QL MICRO: ABNORMAL /HPF
BASOPHILS ABSOLUTE: 0 THOU/MM3 (ref 0–0.1)
BASOPHILS NFR BLD AUTO: 0.5 %
BILIRUB CONJ SERPL-MCNC: < 0.2 MG/DL (ref 0–0.3)
BILIRUB SERPL-MCNC: 0.2 MG/DL (ref 0.3–1.2)
BILIRUB UR QL STRIP.AUTO: NEGATIVE
BUN SERPL-MCNC: 16 MG/DL (ref 7–22)
CALCIUM SERPL-MCNC: 10.4 MG/DL (ref 8.5–10.5)
CASTS #/AREA URNS LPF: ABNORMAL /LPF
CASTS 2: ABNORMAL /LPF
CHARACTER UR: CLEAR
CHLORIDE SERPL-SCNC: 102 MEQ/L (ref 98–111)
CO2 SERPL-SCNC: 25 MEQ/L (ref 23–33)
COLOR: YELLOW
CREAT SERPL-MCNC: 0.8 MG/DL (ref 0.4–1.2)
CRYSTALS URNS MICRO: ABNORMAL
DEPRECATED RDW RBC AUTO: 50.7 FL (ref 35–45)
EOSINOPHIL NFR BLD AUTO: 1.4 %
EOSINOPHILS ABSOLUTE: 0.1 THOU/MM3 (ref 0–0.4)
EPITHELIAL CELLS, UA: ABNORMAL /HPF
ERYTHROCYTE [DISTWIDTH] IN BLOOD BY AUTOMATED COUNT: 16 % (ref 11.5–14.5)
GFR SERPL CREATININE-BSD FRML MDRD: > 60 ML/MIN/1.73M2
GLUCOSE SERPL-MCNC: 94 MG/DL (ref 70–108)
GLUCOSE UR QL STRIP.AUTO: NEGATIVE MG/DL
HCT VFR BLD AUTO: 39.4 % (ref 37–47)
HGB BLD-MCNC: 11.7 GM/DL (ref 12–16)
HGB UR QL STRIP.AUTO: ABNORMAL
IMM GRANULOCYTES # BLD AUTO: 0.04 THOU/MM3 (ref 0–0.07)
IMM GRANULOCYTES NFR BLD AUTO: 0.5 %
KETONES UR QL STRIP.AUTO: NEGATIVE
LYMPHOCYTES ABSOLUTE: 2.9 THOU/MM3 (ref 1–4.8)
LYMPHOCYTES NFR BLD AUTO: 38 %
MCH RBC QN AUTO: 25.8 PG (ref 26–33)
MCHC RBC AUTO-ENTMCNC: 29.7 GM/DL (ref 32.2–35.5)
MCV RBC AUTO: 87 FL (ref 81–99)
MISCELLANEOUS 2: ABNORMAL
MONOCYTES ABSOLUTE: 0.5 THOU/MM3 (ref 0.4–1.3)
MONOCYTES NFR BLD AUTO: 6.2 %
NEUTROPHILS NFR BLD AUTO: 53.4 %
NITRITE UR QL STRIP: NEGATIVE
NRBC BLD AUTO-RTO: 0 /100 WBC
NT-PROBNP SERPL IA-MCNC: 305.7 PG/ML (ref 0–124)
OSMOLALITY SERPL CALC.SUM OF ELEC: 272.9 MOSMOL/KG (ref 275–300)
PH UR STRIP.AUTO: 7 [PH] (ref 5–9)
PLATELET # BLD AUTO: 314 THOU/MM3 (ref 130–400)
PMV BLD AUTO: 9.5 FL (ref 9.4–12.4)
POTASSIUM SERPL-SCNC: 4.7 MEQ/L (ref 3.5–5.2)
PROT SERPL-MCNC: 6.9 G/DL (ref 6.1–8)
PROT UR STRIP.AUTO-MCNC: NEGATIVE MG/DL
RBC # BLD AUTO: 4.53 MILL/MM3 (ref 4.2–5.4)
RBC URINE: ABNORMAL /HPF
RENAL EPI CELLS #/AREA URNS HPF: ABNORMAL /[HPF]
SEGMENTED NEUTROPHILS ABSOLUTE COUNT: 4.1 THOU/MM3 (ref 1.8–7.7)
SODIUM SERPL-SCNC: 136 MEQ/L (ref 135–145)
SP GR UR REFRACT.AUTO: 1.01 (ref 1–1.03)
TROPONIN T: < 0.01 NG/ML
TROPONIN T: < 0.01 NG/ML
UROBILINOGEN, URINE: 0.2 EU/DL (ref 0–1)
WBC # BLD AUTO: 7.7 THOU/MM3 (ref 4.8–10.8)
WBC #/AREA URNS HPF: ABNORMAL /HPF
WBC #/AREA URNS HPF: ABNORMAL /[HPF]
YEAST LIKE FUNGI URNS QL MICRO: ABNORMAL

## 2023-04-24 PROCEDURE — 80048 BASIC METABOLIC PNL TOTAL CA: CPT

## 2023-04-24 PROCEDURE — 99223 1ST HOSP IP/OBS HIGH 75: CPT | Performed by: PHYSICIAN ASSISTANT

## 2023-04-24 PROCEDURE — 85025 COMPLETE CBC W/AUTO DIFF WBC: CPT

## 2023-04-24 PROCEDURE — 36415 COLL VENOUS BLD VENIPUNCTURE: CPT

## 2023-04-24 PROCEDURE — G0378 HOSPITAL OBSERVATION PER HR: HCPCS

## 2023-04-24 PROCEDURE — 6360000002 HC RX W HCPCS

## 2023-04-24 PROCEDURE — 96372 THER/PROPH/DIAG INJ SC/IM: CPT

## 2023-04-24 PROCEDURE — 87086 URINE CULTURE/COLONY COUNT: CPT

## 2023-04-24 PROCEDURE — 83880 ASSAY OF NATRIURETIC PEPTIDE: CPT

## 2023-04-24 PROCEDURE — 6370000000 HC RX 637 (ALT 250 FOR IP): Performed by: PHYSICIAN ASSISTANT

## 2023-04-24 PROCEDURE — 80076 HEPATIC FUNCTION PANEL: CPT

## 2023-04-24 PROCEDURE — 81001 URINALYSIS AUTO W/SCOPE: CPT

## 2023-04-24 PROCEDURE — 99285 EMERGENCY DEPT VISIT HI MDM: CPT

## 2023-04-24 PROCEDURE — 71045 X-RAY EXAM CHEST 1 VIEW: CPT

## 2023-04-24 PROCEDURE — 93005 ELECTROCARDIOGRAM TRACING: CPT | Performed by: EMERGENCY MEDICINE

## 2023-04-24 PROCEDURE — 84484 ASSAY OF TROPONIN QUANT: CPT

## 2023-04-24 PROCEDURE — 87186 SC STD MICRODIL/AGAR DIL: CPT

## 2023-04-24 PROCEDURE — 96374 THER/PROPH/DIAG INJ IV PUSH: CPT

## 2023-04-24 PROCEDURE — 6360000002 HC RX W HCPCS: Performed by: PHYSICIAN ASSISTANT

## 2023-04-24 PROCEDURE — 87077 CULTURE AEROBIC IDENTIFY: CPT

## 2023-04-24 PROCEDURE — 94640 AIRWAY INHALATION TREATMENT: CPT

## 2023-04-24 RX ORDER — ASPIRIN 81 MG/1
81 TABLET ORAL DAILY
Status: DISCONTINUED | OUTPATIENT
Start: 2023-04-25 | End: 2023-04-26 | Stop reason: HOSPADM

## 2023-04-24 RX ORDER — BUPROPION HYDROCHLORIDE 150 MG/1
150 TABLET ORAL DAILY
Status: DISCONTINUED | OUTPATIENT
Start: 2023-04-24 | End: 2023-04-26 | Stop reason: HOSPADM

## 2023-04-24 RX ORDER — SODIUM CHLORIDE 0.9 % (FLUSH) 0.9 %
5-40 SYRINGE (ML) INJECTION EVERY 12 HOURS SCHEDULED
Status: DISCONTINUED | OUTPATIENT
Start: 2023-04-24 | End: 2023-04-26 | Stop reason: HOSPADM

## 2023-04-24 RX ORDER — SODIUM CHLORIDE 9 MG/ML
INJECTION, SOLUTION INTRAVENOUS PRN
Status: DISCONTINUED | OUTPATIENT
Start: 2023-04-24 | End: 2023-04-26 | Stop reason: HOSPADM

## 2023-04-24 RX ORDER — ACETAMINOPHEN 325 MG/1
650 TABLET ORAL EVERY 6 HOURS PRN
Status: DISCONTINUED | OUTPATIENT
Start: 2023-04-24 | End: 2023-04-26 | Stop reason: HOSPADM

## 2023-04-24 RX ORDER — ASPIRIN 81 MG/1
324 TABLET, CHEWABLE ORAL ONCE
Status: DISCONTINUED | OUTPATIENT
Start: 2023-04-24 | End: 2023-04-26 | Stop reason: HOSPADM

## 2023-04-24 RX ORDER — SUCRALFATE 1 G/1
1 TABLET ORAL 4 TIMES DAILY
Status: DISCONTINUED | OUTPATIENT
Start: 2023-04-24 | End: 2023-04-26 | Stop reason: HOSPADM

## 2023-04-24 RX ORDER — MAGNESIUM HYDROXIDE/ALUMINUM HYDROXICE/SIMETHICONE 120; 1200; 1200 MG/30ML; MG/30ML; MG/30ML
30 SUSPENSION ORAL EVERY 6 HOURS PRN
Status: DISCONTINUED | OUTPATIENT
Start: 2023-04-24 | End: 2023-04-26 | Stop reason: HOSPADM

## 2023-04-24 RX ORDER — PANTOPRAZOLE SODIUM 40 MG/10ML
40 INJECTION, POWDER, LYOPHILIZED, FOR SOLUTION INTRAVENOUS 2 TIMES DAILY
Status: DISCONTINUED | OUTPATIENT
Start: 2023-04-24 | End: 2023-04-24

## 2023-04-24 RX ORDER — SODIUM CHLORIDE 0.9 % (FLUSH) 0.9 %
5-40 SYRINGE (ML) INJECTION PRN
Status: DISCONTINUED | OUTPATIENT
Start: 2023-04-24 | End: 2023-04-26 | Stop reason: HOSPADM

## 2023-04-24 RX ORDER — LEVOTHYROXINE SODIUM 0.07 MG/1
75 TABLET ORAL DAILY
Status: DISCONTINUED | OUTPATIENT
Start: 2023-04-25 | End: 2023-04-26 | Stop reason: HOSPADM

## 2023-04-24 RX ORDER — CARVEDILOL 3.12 MG/1
3.12 TABLET ORAL 2 TIMES DAILY
Status: DISCONTINUED | OUTPATIENT
Start: 2023-04-24 | End: 2023-04-26 | Stop reason: HOSPADM

## 2023-04-24 RX ORDER — ATORVASTATIN CALCIUM 40 MG/1
40 TABLET, FILM COATED ORAL NIGHTLY
Status: DISCONTINUED | OUTPATIENT
Start: 2023-04-24 | End: 2023-04-26 | Stop reason: HOSPADM

## 2023-04-24 RX ORDER — FOLIC ACID 1 MG/1
1 TABLET ORAL DAILY
Status: DISCONTINUED | OUTPATIENT
Start: 2023-04-24 | End: 2023-04-26 | Stop reason: HOSPADM

## 2023-04-24 RX ORDER — FERROUS SULFATE 325(65) MG
325 TABLET ORAL 2 TIMES DAILY
Status: DISCONTINUED | OUTPATIENT
Start: 2023-04-24 | End: 2023-04-26 | Stop reason: HOSPADM

## 2023-04-24 RX ORDER — QUETIAPINE FUMARATE 100 MG/1
300 TABLET, FILM COATED ORAL NIGHTLY
Status: DISCONTINUED | OUTPATIENT
Start: 2023-04-24 | End: 2023-04-24

## 2023-04-24 RX ORDER — ARIPIPRAZOLE 2 MG/1
2 TABLET ORAL DAILY
Status: DISCONTINUED | OUTPATIENT
Start: 2023-04-24 | End: 2023-04-26 | Stop reason: HOSPADM

## 2023-04-24 RX ORDER — ESCITALOPRAM OXALATE 20 MG/1
20 TABLET ORAL DAILY
Status: DISCONTINUED | OUTPATIENT
Start: 2023-04-24 | End: 2023-04-26 | Stop reason: HOSPADM

## 2023-04-24 RX ORDER — MORPHINE SULFATE 4 MG/ML
4 INJECTION, SOLUTION INTRAMUSCULAR; INTRAVENOUS ONCE
Status: COMPLETED | OUTPATIENT
Start: 2023-04-24 | End: 2023-04-24

## 2023-04-24 RX ORDER — TRAZODONE HYDROCHLORIDE 100 MG/1
200 TABLET ORAL NIGHTLY
Status: DISCONTINUED | OUTPATIENT
Start: 2023-04-24 | End: 2023-04-24

## 2023-04-24 RX ORDER — POLYETHYLENE GLYCOL 3350 17 G/17G
17 POWDER, FOR SOLUTION ORAL DAILY PRN
Status: DISCONTINUED | OUTPATIENT
Start: 2023-04-24 | End: 2023-04-26 | Stop reason: HOSPADM

## 2023-04-24 RX ORDER — FENOFIBRATE 160 MG/1
160 TABLET ORAL DAILY
Status: DISCONTINUED | OUTPATIENT
Start: 2023-04-24 | End: 2023-04-26 | Stop reason: HOSPADM

## 2023-04-24 RX ORDER — ENOXAPARIN SODIUM 100 MG/ML
40 INJECTION SUBCUTANEOUS DAILY
Status: DISCONTINUED | OUTPATIENT
Start: 2023-04-24 | End: 2023-04-26 | Stop reason: HOSPADM

## 2023-04-24 RX ORDER — ONDANSETRON 4 MG/1
4 TABLET, ORALLY DISINTEGRATING ORAL EVERY 8 HOURS PRN
Status: DISCONTINUED | OUTPATIENT
Start: 2023-04-24 | End: 2023-04-26 | Stop reason: HOSPADM

## 2023-04-24 RX ORDER — TRAZODONE HYDROCHLORIDE 100 MG/1
300 TABLET ORAL NIGHTLY
Status: DISCONTINUED | OUTPATIENT
Start: 2023-04-24 | End: 2023-04-26 | Stop reason: HOSPADM

## 2023-04-24 RX ORDER — PANTOPRAZOLE SODIUM 40 MG/1
40 TABLET, DELAYED RELEASE ORAL
Status: DISCONTINUED | OUTPATIENT
Start: 2023-04-24 | End: 2023-04-26 | Stop reason: HOSPADM

## 2023-04-24 RX ORDER — ACETAMINOPHEN 650 MG/1
650 SUPPOSITORY RECTAL EVERY 6 HOURS PRN
Status: DISCONTINUED | OUTPATIENT
Start: 2023-04-24 | End: 2023-04-26 | Stop reason: HOSPADM

## 2023-04-24 RX ORDER — ONDANSETRON 2 MG/ML
4 INJECTION INTRAMUSCULAR; INTRAVENOUS EVERY 6 HOURS PRN
Status: DISCONTINUED | OUTPATIENT
Start: 2023-04-24 | End: 2023-04-26 | Stop reason: HOSPADM

## 2023-04-24 RX ADMIN — SUCRALFATE 1 G: 1 TABLET ORAL at 20:13

## 2023-04-24 RX ADMIN — BUPROPION HYDROCHLORIDE 150 MG: 150 TABLET, FILM COATED, EXTENDED RELEASE ORAL at 19:19

## 2023-04-24 RX ADMIN — QUETIAPINE FUMARATE 600 MG: 400 TABLET ORAL at 20:13

## 2023-04-24 RX ADMIN — FOLIC ACID 1 MG: 1 TABLET ORAL at 20:13

## 2023-04-24 RX ADMIN — ARIPIPRAZOLE 2 MG: 2 TABLET ORAL at 19:19

## 2023-04-24 RX ADMIN — FERROUS SULFATE TAB 325 MG (65 MG ELEMENTAL FE) 325 MG: 325 (65 FE) TAB at 20:13

## 2023-04-24 RX ADMIN — TRAZODONE HYDROCHLORIDE 300 MG: 100 TABLET ORAL at 20:12

## 2023-04-24 RX ADMIN — ESCITALOPRAM OXALATE 20 MG: 20 TABLET ORAL at 20:13

## 2023-04-24 RX ADMIN — FENOFIBRATE 160 MG: 160 TABLET ORAL at 20:13

## 2023-04-24 RX ADMIN — MORPHINE SULFATE 4 MG: 4 INJECTION, SOLUTION INTRAMUSCULAR; INTRAVENOUS at 15:15

## 2023-04-24 RX ADMIN — ENOXAPARIN SODIUM 40 MG: 100 INJECTION SUBCUTANEOUS at 19:19

## 2023-04-24 RX ADMIN — MOMETASONE FUROATE AND FORMOTEROL FUMARATE DIHYDRATE 2 PUFF: 200; 5 AEROSOL RESPIRATORY (INHALATION) at 20:38

## 2023-04-24 RX ADMIN — PANTOPRAZOLE SODIUM 40 MG: 40 TABLET, DELAYED RELEASE ORAL at 19:19

## 2023-04-24 ASSESSMENT — PAIN DESCRIPTION - ONSET: ONSET: ON-GOING

## 2023-04-24 ASSESSMENT — PAIN SCALES - GENERAL
PAINLEVEL_OUTOF10: 7
PAINLEVEL_OUTOF10: 7
PAINLEVEL_OUTOF10: 8
PAINLEVEL_OUTOF10: 7

## 2023-04-24 ASSESSMENT — PAIN DESCRIPTION - ORIENTATION
ORIENTATION: MID
ORIENTATION: MID

## 2023-04-24 ASSESSMENT — PAIN DESCRIPTION - PAIN TYPE: TYPE: ACUTE PAIN

## 2023-04-24 ASSESSMENT — PAIN DESCRIPTION - LOCATION
LOCATION: CHEST

## 2023-04-24 ASSESSMENT — PAIN - FUNCTIONAL ASSESSMENT
PAIN_FUNCTIONAL_ASSESSMENT: ACTIVITIES ARE NOT PREVENTED
PAIN_FUNCTIONAL_ASSESSMENT: 0-10

## 2023-04-24 ASSESSMENT — PAIN DESCRIPTION - DESCRIPTORS
DESCRIPTORS: ACHING
DESCRIPTORS: ACHING

## 2023-04-24 ASSESSMENT — PAIN DESCRIPTION - FREQUENCY: FREQUENCY: CONTINUOUS

## 2023-04-24 NOTE — ED PROVIDER NOTES
visit)    MEDICAL DECISION MAKING / ED COURSE:   MDM  / Given patient's history and clinical presentation, work-up in the ED included EKG, chest x-ray, troponins, CBC, CMP, BNP, urinalysis. Given likelihood of unstable angina as well as patient's past cardiac history, decision was made to admit patient for further work-up of her chest pain etiology. Pain control with morphine was given in the ED. Vitals Reviewed:    Vitals:    04/24/23 1318 04/24/23 1449 04/24/23 1600 04/24/23 1652   BP: 133/69 134/84 125/70    Pulse: 75 59 57 59   Resp: 17 20 19    Temp: 98.3 °F (36.8 °C)  98.4 °F (36.9 °C)    TempSrc: Oral  Axillary    SpO2: 95% 94% 97%    Weight: 154 lb (69.9 kg)      Height: 5' 4\" (1.626 m)          Differential Diagnosis includes (but not limited to):  ACS, unstable angina, GERD, costochondritis, pericarditis      External Documentation:   Previous patient encounter documents & history available on EMR was reviewed       Pt was reassessed and the results of pertinent diagnostic studies and exam findings were discussed. The patients provisional diagnosis and plan of care were discussed with the patient and present family utilizing shared decision-making. Any medications were reviewed and indications and risks of medications were discussed with the patient /family present. Strict verbal and written return precautions, instructions and appropriate follow-up provided to  the patient .                    ED Medications administered this visit:  (None if blank)  Medications   aspirin chewable tablet 324 mg ( Oral Canceled Entry 4/24/23 1500)   sodium chloride flush 0.9 % injection 5-40 mL (has no administration in time range)   sodium chloride flush 0.9 % injection 5-40 mL (has no administration in time range)   0.9 % sodium chloride infusion (has no administration in time range)   enoxaparin (LOVENOX) injection 40 mg (has no administration in time range)   ondansetron (ZOFRAN-ODT) disintegrating tablet 4 mg (has

## 2023-04-24 NOTE — ED NOTES
ED to inpatient nurses report    Chief Complaint   Patient presents with    Chest Pain      Present to ED from home  LOC: alert and orientated to name, place, date  Vital signs   Vitals:    04/24/23 1318 04/24/23 1449   BP: 133/69 134/84   Pulse: 75 59   Resp: 17 20   Temp: 98.3 °F (36.8 °C)    TempSrc: Oral    SpO2: 95% 94%   Weight: 154 lb (69.9 kg)    Height: 5' 4\" (1.626 m)       Oxygen Baseline room air    Current needs required none Bipap/Cpap No  LDAs:   Peripheral IV 04/24/23 Left Antecubital (Active)   Site Assessment Clean, dry & intact 04/24/23 1323     Mobility: Independent  Pending ED orders: none  Present condition: stable      C-SSRS Risk of Suicide: No Risk  Swallow Screening    Preferred Language: [place]     Electronically signed by Julissa Mcguire RN on 4/24/2023 at 3:51 PM      Maryann BonillaCinthya Mcguire RN  04/24/23 9166 no

## 2023-04-24 NOTE — H&P
she is experiencing today is similar to the pain that she has been having for the last year or 2, but that she felt odd when it happened, so she decided to seek treatment in the emergency department. Cardiology was contacted and recommended admission for further evaluation. Upon speaking with the patient, she reports a burning sensation in the epigastric region. This also radiates to her left lower quadrant and left chest.  She has a history of gastroesophageal reflux disease and states this feels similar to acid reflux. She is unable to sleep lying flat as the burning sensation in her chest worsens when that happens. She also has coughing when she sleeps flat, states she has resorted to sleeping in a recliner which helps her symptoms. She does state that the chest pain is sometimes associated with shortness of breath and activity, although it occurs also at rest.  It often worsens after eating and in the evenings. She reports undergoing an EGD in the past, but is uncertain what the findings were. She denies any associated diaphoresis or palpitations. She had a stress test in 2021 which was negative for ischemia. A cardiac catheterization in 2017 showed no significant obstructive coronary artery disease. The patient is currently taking aspirin. She has no other complaints at this time. She does continue to smoke half a pack per day. Review of Systems: Pertinent positives as noted in the HPI. All other systems reviewed and negative.     Past Medical History:        Diagnosis Date    Acute renal failure with acute cortical necrosis (HCC) 12/21/2019    Allergic rhinitis     Arthritis     back, arms, hips    Bipolar 1 disorder ([address]     Blood circulation, collateral     Cancer ([address] 2011    cancerous polyps removed - Dr. Yevgeniy Emerson    Cataract     Colon polyps     COPD (chronic obstructive pulmonary disease) ([address] 07/24/2014    COPD (chronic obstructive pulmonary disease) ([address]     Coronary vasospasm ([address]

## 2023-04-24 NOTE — ED TRIAGE NOTES
Presents to ED with complaints of chest pain that has been intermittent since this morning. States she has a cardiac history. States she received 1 nitro in route with no relief. Pt also received 324mg aspirin. Currently rates pain 7 out of 10 in severity. EKG completed.

## 2023-04-24 NOTE — ED NOTES
Patient resting in bed. Respirations easy and unlabored. No distress noted. Call light within reach.      Maryann Mcguire RN  04/24/23 5330

## 2023-04-25 ENCOUNTER — APPOINTMENT (OUTPATIENT)
Dept: NON INVASIVE DIAGNOSTICS | Age: 68
End: 2023-04-25
Payer: MEDICARE

## 2023-04-25 LAB
ANION GAP SERPL CALC-SCNC: 9 MEQ/L (ref 8–16)
BACTERIA UR CULT: ABNORMAL
BASOPHILS ABSOLUTE: 0 THOU/MM3 (ref 0–0.1)
BASOPHILS NFR BLD AUTO: 0.5 %
BUN SERPL-MCNC: 17 MG/DL (ref 7–22)
CALCIUM SERPL-MCNC: 10.1 MG/DL (ref 8.5–10.5)
CHLORIDE SERPL-SCNC: 105 MEQ/L (ref 98–111)
CO2 SERPL-SCNC: 26 MEQ/L (ref 23–33)
CREAT SERPL-MCNC: 0.9 MG/DL (ref 0.4–1.2)
DEPRECATED RDW RBC AUTO: 52 FL (ref 35–45)
EOSINOPHIL NFR BLD AUTO: 1.6 %
EOSINOPHILS ABSOLUTE: 0.1 THOU/MM3 (ref 0–0.4)
ERYTHROCYTE [DISTWIDTH] IN BLOOD BY AUTOMATED COUNT: 16.2 % (ref 11.5–14.5)
GFR SERPL CREATININE-BSD FRML MDRD: > 60 ML/MIN/1.73M2
GLUCOSE SERPL-MCNC: 132 MG/DL (ref 70–108)
HCT VFR BLD AUTO: 39.8 % (ref 37–47)
HGB BLD-MCNC: 11.7 GM/DL (ref 12–16)
IMM GRANULOCYTES # BLD AUTO: 0.05 THOU/MM3 (ref 0–0.07)
IMM GRANULOCYTES NFR BLD AUTO: 0.8 %
LV EF: 63 %
LVEF MODALITY: NORMAL
LYMPHOCYTES ABSOLUTE: 2.4 THOU/MM3 (ref 1–4.8)
LYMPHOCYTES NFR BLD AUTO: 37.7 %
MCH RBC QN AUTO: 25.7 PG (ref 26–33)
MCHC RBC AUTO-ENTMCNC: 29.4 GM/DL (ref 32.2–35.5)
MCV RBC AUTO: 87.3 FL (ref 81–99)
MONOCYTES ABSOLUTE: 0.5 THOU/MM3 (ref 0.4–1.3)
MONOCYTES NFR BLD AUTO: 7.5 %
NEUTROPHILS NFR BLD AUTO: 51.9 %
NRBC BLD AUTO-RTO: 0 /100 WBC
ORGANISM: ABNORMAL
PLATELET # BLD AUTO: 281 THOU/MM3 (ref 130–400)
PMV BLD AUTO: 10 FL (ref 9.4–12.4)
POTASSIUM SERPL-SCNC: 4.8 MEQ/L (ref 3.5–5.2)
RBC # BLD AUTO: 4.56 MILL/MM3 (ref 4.2–5.4)
SEGMENTED NEUTROPHILS ABSOLUTE COUNT: 3.3 THOU/MM3 (ref 1.8–7.7)
SODIUM SERPL-SCNC: 140 MEQ/L (ref 135–145)
TROPONIN T: < 0.01 NG/ML
WBC # BLD AUTO: 6.4 THOU/MM3 (ref 4.8–10.8)

## 2023-04-25 PROCEDURE — 36415 COLL VENOUS BLD VENIPUNCTURE: CPT

## 2023-04-25 PROCEDURE — 99239 HOSP IP/OBS DSCHRG MGMT >30: CPT

## 2023-04-25 PROCEDURE — 2580000003 HC RX 258: Performed by: PHYSICIAN ASSISTANT

## 2023-04-25 PROCEDURE — 85025 COMPLETE CBC W/AUTO DIFF WBC: CPT

## 2023-04-25 PROCEDURE — 6370000000 HC RX 637 (ALT 250 FOR IP): Performed by: PHYSICIAN ASSISTANT

## 2023-04-25 PROCEDURE — 93010 ELECTROCARDIOGRAM REPORT: CPT | Performed by: INTERNAL MEDICINE

## 2023-04-25 PROCEDURE — 6360000002 HC RX W HCPCS

## 2023-04-25 PROCEDURE — 99215 OFFICE O/P EST HI 40 MIN: CPT | Performed by: INTERNAL MEDICINE

## 2023-04-25 PROCEDURE — 78452 HT MUSCLE IMAGE SPECT MULT: CPT | Performed by: INTERNAL MEDICINE

## 2023-04-25 PROCEDURE — 93017 CV STRESS TEST TRACING ONLY: CPT | Performed by: INTERNAL MEDICINE

## 2023-04-25 PROCEDURE — 96372 THER/PROPH/DIAG INJ SC/IM: CPT

## 2023-04-25 PROCEDURE — A9500 TC99M SESTAMIBI: HCPCS | Performed by: INTERNAL MEDICINE

## 2023-04-25 PROCEDURE — 94640 AIRWAY INHALATION TREATMENT: CPT

## 2023-04-25 PROCEDURE — 93306 TTE W/DOPPLER COMPLETE: CPT

## 2023-04-25 PROCEDURE — 80048 BASIC METABOLIC PNL TOTAL CA: CPT

## 2023-04-25 PROCEDURE — G0378 HOSPITAL OBSERVATION PER HR: HCPCS

## 2023-04-25 PROCEDURE — 6360000002 HC RX W HCPCS: Performed by: PHYSICIAN ASSISTANT

## 2023-04-25 PROCEDURE — 3430000000 HC RX DIAGNOSTIC RADIOPHARMACEUTICAL: Performed by: INTERNAL MEDICINE

## 2023-04-25 RX ORDER — PANTOPRAZOLE SODIUM 40 MG/1
40 TABLET, DELAYED RELEASE ORAL
Qty: 30 TABLET | Refills: 3 | Status: SHIPPED | OUTPATIENT
Start: 2023-04-25 | End: 2023-04-25 | Stop reason: SDUPTHER

## 2023-04-25 RX ORDER — TETRAKIS(2-METHOXYISOBUTYLISOCYANIDE)COPPER(I) TETRAFLUOROBORATE 1 MG/ML
9.8 INJECTION, POWDER, LYOPHILIZED, FOR SOLUTION INTRAVENOUS
Status: COMPLETED | OUTPATIENT
Start: 2023-04-25 | End: 2023-04-25

## 2023-04-25 RX ORDER — PANTOPRAZOLE SODIUM 40 MG/1
40 TABLET, DELAYED RELEASE ORAL
Qty: 30 TABLET | Refills: 3 | Status: SHIPPED | OUTPATIENT
Start: 2023-04-25

## 2023-04-25 RX ORDER — SUCRALFATE 1 G/1
1 TABLET ORAL 4 TIMES DAILY
Qty: 120 TABLET | Refills: 3 | Status: SHIPPED | OUTPATIENT
Start: 2023-04-25

## 2023-04-25 RX ORDER — TETRAKIS(2-METHOXYISOBUTYLISOCYANIDE)COPPER(I) TETRAFLUOROBORATE 1 MG/ML
34 INJECTION, POWDER, LYOPHILIZED, FOR SOLUTION INTRAVENOUS
Status: COMPLETED | OUTPATIENT
Start: 2023-04-25 | End: 2023-04-25

## 2023-04-25 RX ADMIN — PANTOPRAZOLE SODIUM 40 MG: 40 TABLET, DELAYED RELEASE ORAL at 15:25

## 2023-04-25 RX ADMIN — TIOTROPIUM BROMIDE INHALATION SPRAY 2 PUFF: 3.12 SPRAY, METERED RESPIRATORY (INHALATION) at 08:31

## 2023-04-25 RX ADMIN — FOLIC ACID 1 MG: 1 TABLET ORAL at 11:55

## 2023-04-25 RX ADMIN — LEVOTHYROXINE SODIUM 75 MCG: 0.07 TABLET ORAL at 05:38

## 2023-04-25 RX ADMIN — ARIPIPRAZOLE 2 MG: 2 TABLET ORAL at 11:56

## 2023-04-25 RX ADMIN — SODIUM CHLORIDE, PRESERVATIVE FREE 10 ML: 5 INJECTION INTRAVENOUS at 11:56

## 2023-04-25 RX ADMIN — ESCITALOPRAM OXALATE 20 MG: 20 TABLET ORAL at 19:54

## 2023-04-25 RX ADMIN — MOMETASONE FUROATE AND FORMOTEROL FUMARATE DIHYDRATE 2 PUFF: 200; 5 AEROSOL RESPIRATORY (INHALATION) at 17:49

## 2023-04-25 RX ADMIN — PANTOPRAZOLE SODIUM 40 MG: 40 TABLET, DELAYED RELEASE ORAL at 05:38

## 2023-04-25 RX ADMIN — SUCRALFATE 1 G: 1 TABLET ORAL at 17:14

## 2023-04-25 RX ADMIN — FERROUS SULFATE TAB 325 MG (65 MG ELEMENTAL FE) 325 MG: 325 (65 FE) TAB at 11:56

## 2023-04-25 RX ADMIN — BUPROPION HYDROCHLORIDE 150 MG: 150 TABLET, FILM COATED, EXTENDED RELEASE ORAL at 11:56

## 2023-04-25 RX ADMIN — FENOFIBRATE 160 MG: 160 TABLET ORAL at 11:56

## 2023-04-25 RX ADMIN — ATORVASTATIN CALCIUM 40 MG: 40 TABLET, FILM COATED ORAL at 19:54

## 2023-04-25 RX ADMIN — CARVEDILOL 3.12 MG: 3.12 TABLET, FILM COATED ORAL at 11:55

## 2023-04-25 RX ADMIN — Medication 34 MILLICURIE: at 10:15

## 2023-04-25 RX ADMIN — FERROUS SULFATE TAB 325 MG (65 MG ELEMENTAL FE) 325 MG: 325 (65 FE) TAB at 19:54

## 2023-04-25 RX ADMIN — MOMETASONE FUROATE AND FORMOTEROL FUMARATE DIHYDRATE 2 PUFF: 200; 5 AEROSOL RESPIRATORY (INHALATION) at 08:31

## 2023-04-25 RX ADMIN — SUCRALFATE 1 G: 1 TABLET ORAL at 11:55

## 2023-04-25 RX ADMIN — TRAZODONE HYDROCHLORIDE 300 MG: 100 TABLET ORAL at 19:53

## 2023-04-25 RX ADMIN — SUCRALFATE 1 G: 1 TABLET ORAL at 19:54

## 2023-04-25 RX ADMIN — CARVEDILOL 3.12 MG: 3.12 TABLET, FILM COATED ORAL at 19:54

## 2023-04-25 RX ADMIN — ASPIRIN 81 MG: 81 TABLET ORAL at 11:56

## 2023-04-25 RX ADMIN — QUETIAPINE FUMARATE 600 MG: 400 TABLET ORAL at 19:54

## 2023-04-25 RX ADMIN — Medication 9.8 MILLICURIE: at 09:10

## 2023-04-25 RX ADMIN — ACETAMINOPHEN 650 MG: 325 TABLET ORAL at 15:25

## 2023-04-25 RX ADMIN — ENOXAPARIN SODIUM 40 MG: 100 INJECTION SUBCUTANEOUS at 11:56

## 2023-04-25 ASSESSMENT — PAIN DESCRIPTION - DESCRIPTORS: DESCRIPTORS: DISCOMFORT;CRAMPING

## 2023-04-25 ASSESSMENT — PAIN - FUNCTIONAL ASSESSMENT: PAIN_FUNCTIONAL_ASSESSMENT: ACTIVITIES ARE NOT PREVENTED

## 2023-04-25 ASSESSMENT — PAIN SCALES - GENERAL: PAINLEVEL_OUTOF10: 8

## 2023-04-25 ASSESSMENT — PAIN DESCRIPTION - ORIENTATION: ORIENTATION: LOWER

## 2023-04-25 ASSESSMENT — PAIN DESCRIPTION - LOCATION: LOCATION: ABDOMEN;LEG

## 2023-04-25 NOTE — DISCHARGE SUMMARY
Hospitalist Discharge Summary    Patient: Geri Lopez  YOB: 1955  MRN: [id number]   Acct: [de-identified]    Primary Care Physician: Roberta May MD    Admit date  4/24/2023    Discharge date:      Discharge Assessment and Plan:    Chronic Atypical Chest Pain, resolved: Troponin negative x3. EKG without ischemic changes. Echo 4/25 shows preserved EF and no regional wall abnormality or significant valvular heart disease. Stress test negative for ischemia. Most likely due to acid reflux, treatment below. GERD, uncontrolled: Protonix increased to 40 mg twice daily. Carafate 4 times daily. If no improvement, may need to follow-up with GI outpatient. Pneumonitis: Patient reports cough x3 days. Chest x-ray shows suspicion for pneumonitis and right upper lobe likely due to uncontrolled reflux. Current treatment below. Bipolar 1 disorder: Continue home medication    COPD, no acute exacerbation: Recommend following up to resume Trelegy as patient has not been taking. Continue other home medications. Encourage pulmonary hygiene. Essential hypertension: Continue home medications    Hyperlipidemia: Continue home medication      Chief Complaint on presentation: chest pain    Initial H&P / Hospital Course:    Rodgers Paget is a 74 y.o. female with a history of chronic atypical chest pain, bipolar 1 disorder, COPD, hypertension, hyperlipidemia, and gastroesophageal reflux disease who presented to [facility] with chief complaint of chest pain. The patient states she has been having chest pain for the past year or 2. She follows with cardiology for this. She carries a working diagnosis of atypical chest pain. She states her pain that she is experiencing today is similar to the pain that she has been having for the last year or 2, but that she felt odd when it happened, so she decided to seek treatment in the emergency department.   Cardiology was contacted and recommended admission

## 2023-04-25 NOTE — CARE COORDINATION
4/25/23, 2:41 PM EDT  Discharge Planning Evaluation  Social work consult received, patient from [facility].   Patient/Family preference is to return to [facility], per patient. Would patient be willing to go to a skilled facility if needed: maybe. Is there skilled care available at current facility: no.  Barriers to return to current living situation: none  Spoke with Mague Raymond at the facility. Patient bed hold: yes  Anticipated transport plan: ambulette  Do they require COVID 19 test to return to [facility]:no  Is there a required time frame which which COVID test needs done:n/a  Patient's Healthcare Decision Maker: Named in [facility]  Readmission Risk Low 0-14, Mod 15-19), High > 20: Readmission Risk Score: 17.4    Current PCP: Yuval Khalil MD  PCP verified by CM? Yes    Patient Orientation: Alert and Oriented    Patient Cognition: Alert  History Provided by: Patient    Advance Directives:      Code Status: Full Code   Patient's Primary Decision Maker is: Named in [address] [place] [address] (Active): [place] Brother/Sister - [phone]     Discharge Planning:    Patient lives with:   Type of Home:    Primary Care Giver: Self [facility])  Patient Support Systems include: Family Members, Other (Comment) [facility])   Current Financial resources:    Current community resources:    Current services prior to admission:              Current DME:              Type of Home Care services:       ADLS  Prior functional level: Independent in ADLs/IADLs  Current functional level: Independent in ADLs/IADLs    Family can provide assistance at DC: No  Would you like Case Management to discuss the discharge plan with any other family members/significant others, and if so, who?  No  Plans to Return to Present Housing: Yes  Other Identified Issues/Barriers to RETURNING to current housing: none  Potential Assistance needed at discharge:

## 2023-04-25 NOTE — PLAN OF CARE
Problem: Respiratory - Adult  Goal: Clear lung sounds  Outcome: Progressing   Continue inhaled txs. Patient mutually agrees on goals.

## 2023-04-25 NOTE — CARE COORDINATION
4/25/23, 3:10 PM EDT    DISCHARGE PLANNING EVALUATION    Plan discharge tomorrow 4/26 with transport at 8:30 am.  Malon Client at [facility] informed of discharge and transport.

## 2023-04-25 NOTE — CARE COORDINATION
4/25/23, 12:11 PM EDT      DISCHARGE PLANNING EVALUATION    Rah Lynch  Admitted: 4/24/2023  Hospital Day: 0    Location: -23/023-A Reason for admit: Chest pain [R07.9]    Past Medical History:   Diagnosis Date    Acute renal failure with acute cortical necrosis ([address] 12/21/2019    Allergic rhinitis     Arthritis     back, arms, hips    Bipolar 1 disorder ([address]     Blood circulation, collateral     Cancer ([address] 2011    cancerous polyps removed - Dr. Gloria Ulloa    Cataract     Colon polyps     COPD (chronic obstructive pulmonary disease) ([address] 07/24/2014    [facility] ([facility]ronic obstructive [facility] disease) [facility]     Coronary vasospasm ([facility] 08/17/2019    Depression     Diverticulitis of colon     GERD (gastroesophageal reflux disease)     Glaucoma     Hearing loss     Hiatal hernia     History of arterial ischemic stroke 07/20/2019    History of colonoscopy 2002    History of kidney stones     Hx of blood clots 06/17/2014    PE and collar bone area after shoulder surgery    Hyperlipidemia     Hypertension     Liver disease     enlarged liver - damaged with alcohol in past but no cirrhosis per patient    Pneumonia 07/24/2014    Sexual problems     Suicidal thoughts     2015 admitted to 4E from [facility]    Thyroid disease     Urinary incontinence     Vomiting     Wears dentures     Wears glasses        Procedure: No.  Barriers to Discharge: Admitted from ER with CP. Neck and back. Felt confused and sweating. Noted more than usual SOB recently. Follows with Dr. Gena Darden. Cardiology consulted. Echo and Stress ordered. PCP: Lakeshia Messina MD    Readmission Risk Low 0-14, Mod 15-19), High > 20: Readmission Risk Score: 17.4      Advance Directives:      Code Status: Full Code   Patient's Primary Decision Maker is: Named in [address] [place] [address] (Active): [place] Brother/Sister - [phone]    Patient Goals/Plan/Treatment Preferences: Pt from [facility].  No CM visit today as SW will assess

## 2023-04-25 NOTE — CONSULTS
Temp src Pulse Resp SpO2 Height Weight   04/25/23 1131 119/62 97.8 °F (36.6 °C) Oral 65 16 97 % -- --   04/25/23 0940 -- -- -- -- -- -- 5' 4\" (1.626 m) 156 lb (70.8 kg)   04/25/23 0800 (!) 105/48 98 °F (36.7 °C) Oral -- 16 96 % -- --   04/25/23 0539 (!) 109/58 98.1 °F (36.7 °C) Oral 58 16 -- -- --   04/24/23 2010 115/60 98.5 °F (36.9 °C) Oral 65 19 95 % -- --   04/24/23 1652 -- -- -- 59 -- -- -- --   04/24/23 1600 125/70 98.4 °F (36.9 °C) Axillary 57 19 97 % -- --   04/24/23 1449 134/84 -- -- 59 20 94 % -- --   04/24/23 1318 133/69 98.3 °F (36.8 °C) Oral 75 17 95 % 5' 4\" (1.626 m) 154 lb (69.9 kg)       Last 3 weights: Wt Readings from Last 3 Encounters:   04/25/23 156 lb (70.8 kg)   04/07/23 147 lb (66.7 kg)   04/04/23 147 lb (66.7 kg)     24 hour intake/output:  Intake/Output Summary (Last 24 hours) at 4/25/2023 1220  Last data filed at 4/25/2023 1131  Gross per 24 hour   Intake 0 ml   Output 850 ml   Net -850 ml     BMI:Body mass index is 26.78 kg/m². General Appearance: alert and oriented to person, place and time, well developed and well- nourished, in no acute distress  Skin: warm and dry, no rash or erythema  Eyes: pupils equal, round, and reactive to light, extraocular eye movements intact, conjunctivae normal  Neck: supple and non-tender without mass, no thyromegaly or thyroid nodules, no cervical lymphadenopathy  Pulmonary/Chest: clear to auscultation bilaterally- no wheezes, rales or rhonchi, normal air movement, no respiratory distress  Cardiovascular: normal rate, regular rhythm, normal S1 and S2, no murmur. No rubs, clicks, or gallops, distal pulses intact, no carotid bruits, Negative JVD  Radial Pulses: intact 2+  Abdomen: soft, non-tender, non-distended, normal bowel sounds, no masses or organomegaly  Extremities: no cyanosis, clubbing .  No Edema  Musculoskeletal: normal range of motion, no joint swelling, deformity or tenderness      RADIOLOGY   XR CHEST PORTABLE    Result Date:

## 2023-04-25 NOTE — PLAN OF CARE
Problem: Discharge Planning  Goal: Discharge to home or other facility with appropriate resources  Outcome: Progressing  Flowsheets  Taken 4/24/2023 2010 by Peng Dominguez RN  Discharge to home or other facility with appropriate resources:   Identify barriers to discharge with patient and caregiver   Arrange for needed discharge resources and transportation as appropriate   Identify discharge learning needs (meds, wound care, etc)   Refer to discharge planning if patient needs post-hospital services based on physician order or complex needs related to functional status, cognitive ability or social support system  Taken 4/24/2023 1652 by Blanca Hyde RN  Discharge to home or other facility with appropriate resources: Identify barriers to discharge with patient and caregiver     Problem: Chronic Conditions and Co-morbidities  Goal: Patient's chronic conditions and co-morbidity symptoms are monitored and maintained or improved  Outcome: Progressing  Flowsheets  Taken 4/24/2023 2010 by Peng Dominguez RN  Care Plan - Patient's Chronic Conditions and Co-Morbidity Symptoms are Monitored and Maintained or Improved:   Monitor and assess patient's chronic conditions and comorbid symptoms for stability, deterioration, or improvement   Collaborate with multidisciplinary team to address chronic and comorbid conditions and prevent exacerbation or deterioration   Update acute care plan with appropriate goals if chronic or comorbid symptoms are exacerbated and prevent overall improvement and discharge  Taken 4/24/2023 1652 by Simeon Ocasio 34 - Patient's Chronic Conditions and Co-Morbidity Symptoms are Monitored and Maintained or Improved: Monitor and assess patient's chronic conditions and comorbid symptoms for stability, deterioration, or improvement     Problem: Pain  Goal: Verbalizes/displays adequate comfort level or baseline comfort level  Outcome: Progressing  Flowsheets (Taken 4/24/2023

## 2023-04-26 ENCOUNTER — TELEPHONE (OUTPATIENT)
Dept: CARDIOLOGY CLINIC | Age: 68
End: 2023-04-26

## 2023-04-26 VITALS
OXYGEN SATURATION: 94 % | TEMPERATURE: 98.3 F | HEART RATE: 62 BPM | RESPIRATION RATE: 16 BRPM | HEIGHT: 64 IN | WEIGHT: 156 LBS | BODY MASS INDEX: 26.63 KG/M2 | SYSTOLIC BLOOD PRESSURE: 114 MMHG | DIASTOLIC BLOOD PRESSURE: 80 MMHG

## 2023-04-26 PROCEDURE — G0378 HOSPITAL OBSERVATION PER HR: HCPCS

## 2023-04-26 PROCEDURE — 6370000000 HC RX 637 (ALT 250 FOR IP): Performed by: PHYSICIAN ASSISTANT

## 2023-04-26 PROCEDURE — 94640 AIRWAY INHALATION TREATMENT: CPT

## 2023-04-26 RX ADMIN — ASPIRIN 81 MG: 81 TABLET ORAL at 08:08

## 2023-04-26 RX ADMIN — FOLIC ACID 1 MG: 1 TABLET ORAL at 08:08

## 2023-04-26 RX ADMIN — FENOFIBRATE 160 MG: 160 TABLET ORAL at 08:08

## 2023-04-26 RX ADMIN — PANTOPRAZOLE SODIUM 40 MG: 40 TABLET, DELAYED RELEASE ORAL at 05:29

## 2023-04-26 RX ADMIN — MOMETASONE FUROATE AND FORMOTEROL FUMARATE DIHYDRATE 2 PUFF: 200; 5 AEROSOL RESPIRATORY (INHALATION) at 06:16

## 2023-04-26 RX ADMIN — TIOTROPIUM BROMIDE INHALATION SPRAY 2 PUFF: 3.12 SPRAY, METERED RESPIRATORY (INHALATION) at 06:16

## 2023-04-26 RX ADMIN — CARVEDILOL 3.12 MG: 3.12 TABLET, FILM COATED ORAL at 08:08

## 2023-04-26 RX ADMIN — FERROUS SULFATE TAB 325 MG (65 MG ELEMENTAL FE) 325 MG: 325 (65 FE) TAB at 08:08

## 2023-04-26 RX ADMIN — ARIPIPRAZOLE 2 MG: 2 TABLET ORAL at 08:08

## 2023-04-26 RX ADMIN — LEVOTHYROXINE SODIUM 75 MCG: 0.07 TABLET ORAL at 05:29

## 2023-04-26 RX ADMIN — BUPROPION HYDROCHLORIDE 150 MG: 150 TABLET, FILM COATED, EXTENDED RELEASE ORAL at 08:08

## 2023-04-26 RX ADMIN — SUCRALFATE 1 G: 1 TABLET ORAL at 08:08

## 2023-04-26 NOTE — TELEPHONE ENCOUNTER
Dr Jefe Pratt was consulted on pt 4/24 in [id number] [phone]  States her discharge summary states to stop taking plavix   But she needs an order   I advised nurse Anita Tucker to call the hospital for the D/C order and they told her to contact Dr Kelvin Porter office     I do not see where Dr Jefe Pratt D/C the plavix    Dr Jefe Pratt please advise

## 2023-04-26 NOTE — PLAN OF CARE
Problem: Discharge Planning  Goal: Discharge to home or other facility with appropriate resources  4/25/2023 2028 by Sharon Gomez RN  Outcome: Adequate for Discharge  Flowsheets (Taken 4/25/2023 1945)  Discharge to home or other facility with appropriate resources:   Identify barriers to discharge with patient and caregiver   Arrange for needed discharge resources and transportation as appropriate   Identify discharge learning needs (meds, wound care, etc)   Refer to discharge planning if patient needs post-hospital services based on physician order or complex needs related to functional status, cognitive ability or social support system  4/25/2023 1445 by REYES Fine  Outcome: Progressing     Problem: Chronic Conditions and Co-morbidities  Goal: Patient's chronic conditions and co-morbidity symptoms are monitored and maintained or improved  Outcome: Adequate for Discharge  Flowsheets (Taken 4/25/2023 1945)  Care Plan - Patient's Chronic Conditions and Co-Morbidity Symptoms are Monitored and Maintained or Improved:   Monitor and assess patient's chronic conditions and comorbid symptoms for stability, deterioration, or improvement   Collaborate with multidisciplinary team to address chronic and comorbid conditions and prevent exacerbation or deterioration   Update acute care plan with appropriate goals if chronic or comorbid symptoms are exacerbated and prevent overall improvement and discharge     Problem: Pain  Goal: Verbalizes/displays adequate comfort level or baseline comfort level  Outcome: Adequate for Discharge  Flowsheets (Taken 4/25/2023 1945)  Verbalizes/displays adequate comfort level or baseline comfort level:   Encourage patient to monitor pain and request assistance   Assess pain using appropriate pain scale   Administer analgesics based on type and severity of pain and evaluate response   Implement non-pharmacological measures as appropriate and evaluate response   Consider cultural and

## 2023-04-26 NOTE — CARE COORDINATION
4/26/23, 3:19 PM EDT    Patient goals/plan/ treatment preferences discussed by  and . Patient goals/plan/ treatment preferences reviewed with patient/ family. Patient/ family verbalize understanding of discharge plan and are in agreement with goal/plan/treatment preferences. Understanding was demonstrated using the teach back method. AVS provided by RN at time of discharge, which includes all necessary medical information pertaining to the patients current course of illness, treatment, post-discharge goals of care, and treatment preferences.      Services At/After Discharge: Group home and Aide services, [address]        IMM Letter  Observation Status Letter date given[de-identified] 04/24/23  Observation Status Letter time given[de-identified] [id number]  Observation Status Letter given to Patient/Family/Significant other/Guardian/POA/by[de-identified] Pt Access

## 2023-04-26 NOTE — PLAN OF CARE
Problem: Respiratory - Adult  Goal: Clear lung sounds  Outcome: Progressing   Patient mutually agrees upon goal.

## 2023-04-27 ENCOUNTER — TELEPHONE (OUTPATIENT)
Dept: CARDIOLOGY CLINIC | Age: 68
End: 2023-04-27

## 2023-04-27 NOTE — TELEPHONE ENCOUNTER
OV Note with Dr. Florentin Cannon  Assessment/Plan   Atypical chest pain - chronic  Sister with recent CABG  50% RCA hx  Bipolar disorder  EF 60%  3/6 ARRON - LVOTo - 5 m/s  Increase fluid intake, monitor symptoms, watch for anesthesia induction and avoid significant hypotension. Will continue current meds. Needs to stop smoking. She held Plavix, and will d/c at this point no PCI or CVA. Discussed diet/exercise/BP/weight loss/health lifestyle choices/lipids; the patient understands the goals and will try to comply.      Disposition:  1 year           Electronically signed by Myke Mckeon MD   1/9/2023 at 2:20 PM EST

## 2023-04-27 NOTE — TELEPHONE ENCOUNTER
Spoke with [employer].   Office note and d/c order faxed to [facility] at [phone]

## 2023-04-28 LAB
EKG ATRIAL RATE: 64 BPM
EKG P AXIS: 60 DEGREES
EKG P-R INTERVAL: 148 MS
EKG Q-T INTERVAL: 422 MS
EKG QRS DURATION: 80 MS
EKG QTC CALCULATION (BAZETT): 435 MS
EKG R AXIS: 1 DEGREES
EKG T AXIS: 71 DEGREES
EKG VENTRICULAR RATE: 64 BPM

## 2023-05-11 ENCOUNTER — HOSPITAL ENCOUNTER (OUTPATIENT)
Dept: CT IMAGING | Age: 68
Discharge: HOME | End: 2023-05-11
Payer: MEDICARE

## 2023-05-11 DIAGNOSIS — S37.019A PERINEPHRIC HEMATOMA: ICD-10-CM

## 2023-05-11 PROCEDURE — 6360000004 HC RX CONTRAST MEDICATION: Performed by: NURSE PRACTITIONER

## 2023-05-11 PROCEDURE — 74170 CT ABD WO CNTRST FLWD CNTRST: CPT

## 2023-05-11 RX ADMIN — IOPAMIDOL 80 ML: 755 INJECTION, SOLUTION INTRAVENOUS at 09:49

## 2023-05-18 ENCOUNTER — OFFICE VISIT (OUTPATIENT)
Dept: UROLOGY | Age: 68
End: 2023-05-18
Payer: MEDICARE

## 2023-05-18 VITALS — HEIGHT: 64 IN | BODY MASS INDEX: 26.63 KG/M2 | WEIGHT: 156 LBS | RESPIRATION RATE: 18 BRPM

## 2023-05-18 DIAGNOSIS — N39.0 URINARY TRACT INFECTION WITHOUT HEMATURIA, SITE UNSPECIFIED: Primary | ICD-10-CM

## 2023-05-18 DIAGNOSIS — S37.019A PERINEPHRIC HEMATOMA: Primary | ICD-10-CM

## 2023-05-18 DIAGNOSIS — N39.41 URGE INCONTINENCE OF URINE: ICD-10-CM

## 2023-05-18 PROCEDURE — G8417 CALC BMI ABV UP PARAM F/U: HCPCS | Performed by: UROLOGY

## 2023-05-18 PROCEDURE — 1090F PRES/ABSN URINE INCON ASSESS: CPT | Performed by: UROLOGY

## 2023-05-18 PROCEDURE — 99214 OFFICE O/P EST MOD 30 MIN: CPT | Performed by: UROLOGY

## 2023-05-18 PROCEDURE — G8400 PT W/DXA NO RESULTS DOC: HCPCS | Performed by: UROLOGY

## 2023-05-18 PROCEDURE — 0509F URINE INCON PLAN DOCD: CPT | Performed by: UROLOGY

## 2023-05-18 PROCEDURE — 3017F COLORECTAL CA SCREEN DOC REV: CPT | Performed by: UROLOGY

## 2023-05-18 PROCEDURE — G8427 DOCREV CUR MEDS BY ELIG CLIN: HCPCS | Performed by: UROLOGY

## 2023-05-18 PROCEDURE — 1123F ACP DISCUSS/DSCN MKR DOCD: CPT | Performed by: UROLOGY

## 2023-05-18 PROCEDURE — 4004F PT TOBACCO SCREEN RCVD TLK: CPT | Performed by: UROLOGY

## 2023-05-18 RX ORDER — CIPROFLOXACIN 500 MG/1
500 TABLET, FILM COATED ORAL 2 TIMES DAILY
Qty: 14 TABLET | Refills: 0 | Status: SHIPPED | OUTPATIENT
Start: 2023-05-18

## 2023-05-18 NOTE — PROGRESS NOTES
[place] [address]  [address] [place] [address]  Dept: [phone]  Loc: [phone]    Visit Date: 5/18/2023        HPI:     Geri Lopez is a 74 y.o. female who presents today for:  Chief Complaint   Patient presents with    Results     RV CT scan       HPI  Patients presents to urology clinic for ED follow-up regarding left perinephric hematoma. Reports ongoing constipation since the 4/4/23. No bowel regimen in place. Denies dysuria, back pain, fever or chills or difficulty with urination. Discussed importance of regular bowel movements. Patient with history of following:  Kidney stone  Had cystoscopy left ureteroscopy, left holmium laser lithotripsy and stent exchange 1/12/23 by Dr. Coby Aiken. Nonobstructive nephrolithiasis on the left. Incontinence  Had interstim removed by [facility]  Botox 100 u 4/2022 was only 20 percent helpful  Botox 200 units 12/2022      Perinephric hematoma after recent URS  Decreasing in size  Here for follow up    Interim, b/l flank pain radiating to groin particularly on left, w/  cva tenderness, has some dysuria, noted cloudy urine but no hematuria. Incontinence worsened, increased urinary symptoms, wearing pads, botox lasted approx 5 months per pt. Denies F/C. Pending hip sx. I independently reviewed and verified the images and reports from:    CT ABDOMEN W WO CONTRAST Additional Contrast? None    Result Date: 5/11/2023  PROCEDURE: CT ABDOMEN W WO CONTRAST CLINICAL INFORMATION: 44-year-old female with history of a perinephric hematoma. Follow-up exam . COMPARISON: CT 04/04/2023. TECHNIQUE: Axial 5 mm CT images were obtained through the abdomen before and after the administration of intravenous contrast. Coronal reconstructions were obtained.  All CT scans at this facility use dose modulation, iterative reconstruction, and/or weight-based dosing when appropriate to reduce radiation dose

## 2023-05-20 LAB
BACTERIA UR CULT: ABNORMAL
ORGANISM: ABNORMAL

## 2023-05-22 ENCOUNTER — TELEPHONE (OUTPATIENT)
Dept: UROLOGY | Age: 68
End: 2023-05-22

## 2023-05-23 NOTE — TELEPHONE ENCOUNTER
Pt's urine culture significant for infection sensitive to the cipro she was prescribed by Dr. Hunter Villegas. Continue to completion.

## 2023-05-23 NOTE — TELEPHONE ENCOUNTER
Patient advised of the urine results and to continue the cipro until completed. She voiced understanding.

## 2023-05-25 ENCOUNTER — TELEPHONE (OUTPATIENT)
Dept: UROLOGY | Age: 68
End: 2023-05-25

## 2023-05-25 NOTE — TELEPHONE ENCOUNTER
Per Francy Arteaga at [facility] patient has authorization on file for [id number] and  valid from 12/27/22-12/27/23. Auth# [id number].  Call ref# [id number] (date and time in [facility])

## 2023-06-20 ENCOUNTER — TELEPHONE (OUTPATIENT)
Dept: UROLOGY | Age: 68
End: 2023-06-20

## 2023-06-20 NOTE — TELEPHONE ENCOUNTER
Pt calling for pain in her bladder and vagina area. Pt to have cyto and botox with dr. Ramos Carr. Pt stated she can not wait that long with pain. Pt instructed that pain medication will not be given for this and if pain is severe to go to the er for evaluation. Pt hung up when told she would not be getting pain medication.

## 2023-06-22 NOTE — TELEPHONE ENCOUNTER
Can check urine micro, culture, and renal US. Can also renal US, and hemoglobin/hematocrit, BMP, given hx of hematoma. Recommend she ensure regular BM. The patient should go to the ED should they develop fever, chills, nausea, vomiting, chest pain, SOB, calf pain, feelings of incomplete emptying, or should they otherwise feel they need evaluated    Cannot give pain medications for bladder/vaginal pain.

## 2023-06-23 RX ORDER — CEFDINIR 300 MG/1
300 CAPSULE ORAL 2 TIMES DAILY
Qty: 14 CAPSULE | Refills: 0 | Status: SHIPPED | OUTPATIENT
Start: 2023-06-23 | End: 2023-06-30

## 2023-06-23 NOTE — TELEPHONE ENCOUNTER
Guy Arredondo from [facility] called to let us know that they did do a urine dip stick on Mercedes. It came back 2+blood, 2+ leukocytes and positive for nitrates. She did try to fax over the results but it would not go through. Urine was also sent out for culture. Would you like her to be started on something?

## 2023-06-26 ENCOUNTER — TELEPHONE (OUTPATIENT)
Dept: UROLOGY | Age: 68
End: 2023-06-26

## 2023-06-26 DIAGNOSIS — N39.41 URGE INCONTINENCE OF URINE: Primary | ICD-10-CM

## 2023-06-26 DIAGNOSIS — N32.81 OAB (OVERACTIVE BLADDER): ICD-10-CM

## 2023-06-26 DIAGNOSIS — Z01.818 PRE-OP TESTING: ICD-10-CM

## 2023-06-28 ENCOUNTER — TELEPHONE (OUTPATIENT)
Dept: UROLOGY | Age: 68
End: 2023-06-28

## 2023-07-11 ENCOUNTER — TELEPHONE (OUTPATIENT)
Dept: UROLOGY | Age: 68
End: 2023-07-11

## 2023-07-11 NOTE — TELEPHONE ENCOUNTER
Good Morning. This is Mariah. Regarding patient Khadijah Francisco ( [birth year]) having Botox on 8/10/23. Dr Gustavo Cox wants her to stay on her 81 mg aspirin. Ok with that?   Read 7/11/2023 10:25 AM  7/11/2023 10:25 AM  No problem      Spoke with Enedelia de los santos at [facility] and she voiced understanding

## 2023-07-14 ENCOUNTER — PREP FOR PROCEDURE (OUTPATIENT)
Dept: UROLOGY | Age: 68
End: 2023-07-14

## 2023-07-19 ENCOUNTER — TELEPHONE (OUTPATIENT)
Dept: UROLOGY | Age: 68
End: 2023-07-19

## 2023-07-19 NOTE — TELEPHONE ENCOUNTER
Patient [place] A[place] PELVIS [place]  [place] [facility] [place] ARRIVAL OF 11 AM FIR A 1130 AM SCAN TIME WITH NO PREP.     Order mailed with instructions or given to the patient in the office

## 2023-07-24 RX ORDER — SODIUM CHLORIDE 0.9 % (FLUSH) 0.9 %
5-40 SYRINGE (ML) INJECTION PRN
Status: CANCELLED | OUTPATIENT
Start: 2023-07-24

## 2023-07-24 RX ORDER — SODIUM CHLORIDE 9 MG/ML
INJECTION, SOLUTION INTRAVENOUS PRN
Status: CANCELLED | OUTPATIENT
Start: 2023-07-24

## 2023-07-24 RX ORDER — SODIUM CHLORIDE 0.9 % (FLUSH) 0.9 %
5-40 SYRINGE (ML) INJECTION EVERY 12 HOURS SCHEDULED
Status: CANCELLED | OUTPATIENT
Start: 2023-07-24

## 2023-07-27 ENCOUNTER — NURSE ONLY (OUTPATIENT)
Dept: LAB | Age: 68
End: 2023-07-27

## 2023-07-27 DIAGNOSIS — N39.41 URGE INCONTINENCE OF URINE: ICD-10-CM

## 2023-07-27 DIAGNOSIS — N32.81 OAB (OVERACTIVE BLADDER): ICD-10-CM

## 2023-07-27 DIAGNOSIS — Z01.818 PRE-OP TESTING: ICD-10-CM

## 2023-07-28 LAB
BACTERIA UR CULT: ABNORMAL
ORGANISM: ABNORMAL

## 2023-07-31 ENCOUNTER — TELEPHONE (OUTPATIENT)
Dept: UROLOGY | Age: 68
End: 2023-07-31

## 2023-07-31 RX ORDER — CEPHALEXIN 500 MG/1
500 CAPSULE ORAL 2 TIMES DAILY
Qty: 14 CAPSULE | Refills: 0 | Status: SHIPPED | OUTPATIENT
Start: 2023-07-31 | End: 2023-08-07

## 2023-07-31 NOTE — TELEPHONE ENCOUNTER
Ashish Antunez at [place] advised of the urine results and keflex was sent to the pharmacy.      Spoke to [facility] they [facility]

## 2023-07-31 NOTE — TELEPHONE ENCOUNTER
----- Message from BA Salazar CNP sent at 7/31/2023 12:07 PM EDT -----  Urine culture mixed growth, keflex sent.  Patient at [facility]# [phone]

## 2023-08-07 NOTE — PROGRESS NOTES
Arrival time:per office  Directions given: yes  Who will be transporting? [facility]  Who will be coming with patient? No one   Need to have someone with patient to sign post-op instruction sheet if MAC or    General anesthesia  NPO: take heart and BP medications am of surgery with small sip of water  Is patient oriented? yes  Does patient have POA?  no   If patient has POA need to bring POA papers  Is patient ambulatory? yes   Does patient use assistive devices?  No   Is patient non-weight bearing? no   How many people does it take to move patient?  ______Covid vaccine  ______Covid test    Height  5f4i  Weight 172 lb  Fax: MAR, allergy list, medical/surgical history    General instructions:  NPO after midnight  [employer] and drivers license  Wear comfortable clean clothing  Do not bring jewelry   Shower night before and morning of surgery with a liquid antibacterial soap  Follow all instructions given by your physician   needed at discharge  Send medication list with last dose documented  Name of nurse you spoke with from [facility]

## 2023-08-08 ENCOUNTER — TELEPHONE (OUTPATIENT)
Dept: UROLOGY | Age: 68
End: 2023-08-08

## 2023-08-08 DIAGNOSIS — Z01.818 PRE-OP TESTING: Primary | ICD-10-CM

## 2023-08-08 DIAGNOSIS — N39.41 URGE INCONTINENCE OF URINE: ICD-10-CM

## 2023-08-08 DIAGNOSIS — N32.81 OAB (OVERACTIVE BLADDER): ICD-10-CM

## 2023-08-08 NOTE — TELEPHONE ENCOUNTER
Due to insurance issues patients surgery has been rescheduled from 8/10/22 to 8/22/23. [facility] notified. Patient to do repeat urine culture on 8/10/23. Re sent paper work to the E[facility].

## 2023-08-09 RX ORDER — POLYETHYLENE GLYCOL 3350 17 G/17G
17 POWDER, FOR SOLUTION ORAL DAILY PRN
COMMUNITY

## 2023-08-09 RX ORDER — GUAIFENESIN AND DEXTROMETHORPHAN HYDROBROMIDE 100; 10 MG/5ML; MG/5ML
10 SOLUTION ORAL EVERY 6 HOURS PRN
Status: ON HOLD | COMMUNITY
End: 2023-08-22

## 2023-08-09 RX ORDER — ACETAMINOPHEN 160 MG
1 TABLET,DISINTEGRATING ORAL DAILY
COMMUNITY

## 2023-08-14 ENCOUNTER — TELEPHONE (OUTPATIENT)
Dept: UROLOGY | Age: 68
End: 2023-08-14
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
## 2023-08-14 NOTE — TELEPHONE ENCOUNTER
Auth # A [id number] for CPT [id number] Good from 8/21/23 to 11/19/23         Auth # A [id number] for  Botox 200 units  Good from 8/21/23 to 8/21/24  800 units total

## 2023-08-16 ENCOUNTER — OFFICE VISIT (OUTPATIENT)
Dept: PULMONOLOGY | Age: 68
End: 2023-08-16
Payer: MEDICARE

## 2023-08-16 VITALS
TEMPERATURE: 97.1 F | SYSTOLIC BLOOD PRESSURE: 136 MMHG | DIASTOLIC BLOOD PRESSURE: 74 MMHG | HEIGHT: 64 IN | WEIGHT: 171.8 LBS | OXYGEN SATURATION: 97 % | HEART RATE: 70 BPM | BODY MASS INDEX: 29.33 KG/M2

## 2023-08-16 DIAGNOSIS — J44.9 CHRONIC OBSTRUCTIVE PULMONARY DISEASE, UNSPECIFIED COPD TYPE (HCC): Primary | ICD-10-CM

## 2023-08-16 DIAGNOSIS — F17.200 SMOKER UNMOTIVATED TO QUIT: ICD-10-CM

## 2023-08-16 DIAGNOSIS — Z87.891 PERSONAL HISTORY OF TOBACCO USE: ICD-10-CM

## 2023-08-16 DIAGNOSIS — J42 CHRONIC BRONCHITIS, UNSPECIFIED CHRONIC BRONCHITIS TYPE (HCC): ICD-10-CM

## 2023-08-16 PROCEDURE — G8417 CALC BMI ABV UP PARAM F/U: HCPCS | Performed by: INTERNAL MEDICINE

## 2023-08-16 PROCEDURE — G8427 DOCREV CUR MEDS BY ELIG CLIN: HCPCS | Performed by: INTERNAL MEDICINE

## 2023-08-16 PROCEDURE — 99214 OFFICE O/P EST MOD 30 MIN: CPT | Performed by: INTERNAL MEDICINE

## 2023-08-16 PROCEDURE — G8400 PT W/DXA NO RESULTS DOC: HCPCS | Performed by: INTERNAL MEDICINE

## 2023-08-16 PROCEDURE — G0296 VISIT TO DETERM LDCT ELIG: HCPCS | Performed by: INTERNAL MEDICINE

## 2023-08-16 PROCEDURE — 3075F SYST BP GE 130 - 139MM HG: CPT | Performed by: INTERNAL MEDICINE

## 2023-08-16 PROCEDURE — 3023F SPIROM DOC REV: CPT | Performed by: INTERNAL MEDICINE

## 2023-08-16 PROCEDURE — 3078F DIAST BP <80 MM HG: CPT | Performed by: INTERNAL MEDICINE

## 2023-08-16 PROCEDURE — 4004F PT TOBACCO SCREEN RCVD TLK: CPT | Performed by: INTERNAL MEDICINE

## 2023-08-16 PROCEDURE — 1123F ACP DISCUSS/DSCN MKR DOCD: CPT | Performed by: INTERNAL MEDICINE

## 2023-08-16 PROCEDURE — 3017F COLORECTAL CA SCREEN DOC REV: CPT | Performed by: INTERNAL MEDICINE

## 2023-08-16 PROCEDURE — 1090F PRES/ABSN URINE INCON ASSESS: CPT | Performed by: INTERNAL MEDICINE

## 2023-08-16 ASSESSMENT — ENCOUNTER SYMPTOMS
VOICE CHANGE: 1
WHEEZING: 1
CHEST TIGHTNESS: 1
SHORTNESS OF BREATH: 1
COUGH: 1

## 2023-08-16 NOTE — PROGRESS NOTES
Neck Circumference -   16  Mallampati - 3    Lung Nodule Screening     [x] Qualifies    [] Does not qualify   [] Declined    [] Completed
daily as needed for Constipation      ciprofloxacin (CIPRO) 500 MG tablet Take 1 tablet by mouth 2 times daily (Patient not taking: Reported on 8/9/2023) 14 tablet 0    pantoprazole (PROTONIX) 40 MG tablet Take 1 tablet by mouth 2 times daily (before meals) (Patient not taking: Reported on 5/18/2023) 30 tablet 3    sucralfate (CARAFATE) 1 GM tablet Take 1 tablet by mouth 4 times daily 120 tablet 3    buPROPion (WELLBUTRIN XL) 150 MG extended release tablet Take 1 tablet by mouth every morning      Albuterol (VENTOLIN IN) Inhale 90 mcg into the lungs every 6 hours as needed (wheezing)      docusate sodium (COLACE) 100 MG capsule Take 1 capsule by mouth 2 times daily as needed for Constipation (Patient not taking: Reported on 8/9/2023) 30 capsule 1    ciprofloxacin (CIPRO) 500 MG tablet Take 1 tablet by mouth 2 times daily (Patient not taking: Reported on 4/7/2023) 14 tablet 0    fluticasone-umeclidin-vilant (TRELEGY ELLIPTA) 100-62.5-25 MCG/ACT AEPB inhaler INHALE 1 PUFF INTO THE LUNGS DAILY (Patient not taking: Reported on 4/7/2023) 60 each 0    phenazopyridine (PYRIDIUM) 200 MG tablet Take 1 tablet by mouth 3 times daily as needed for Pain (Patient not taking: Reported on 4/7/2023) 9 tablet 0    potassium chloride (KLOR-CON M) 20 MEQ extended release tablet Take 1 tablet by mouth daily      guaiFENesin (MUCINEX) 600 MG extended release tablet Take 1 tablet by mouth 2 times daily (Patient not taking: Reported on 4/7/2023) 20 tablet 0    ondansetron (ZOFRAN-ODT) 4 MG disintegrating tablet Take 1 tablet by mouth 3 times daily as needed for Nausea or Vomiting (Patient not taking: Reported on 4/7/2023) 21 tablet 0    QUEtiapine (SEROQUEL) 300 MG tablet Take 2 tablets by mouth nightly 2 tab      carvedilol (COREG) 3.125 MG tablet TAKE 1 TABLET BY MOUTH TWICE DAILY      aspirin 81 MG EC tablet Take 1 tablet by mouth daily 30 tablet 3    hydrOXYzine (VISTARIL) 50 MG capsule Take 1 capsule by mouth 3 times daily as needed

## 2023-08-18 ENCOUNTER — TELEPHONE (OUTPATIENT)
Dept: UROLOGY | Age: 68
End: 2023-08-18

## 2023-08-18 NOTE — TELEPHONE ENCOUNTER
She was treated with both Keflex and Omnicef within a month  Can send additional abx if having sx  Can someone reach out?  thanks

## 2023-08-18 NOTE — TELEPHONE ENCOUNTER
Spoke with Carmen Donahue at [place] and she stated the patient does not have any symptoms at this time

## 2023-08-18 NOTE — TELEPHONE ENCOUNTER
No need for additional abx  I have personally verified, reviewed, and approved these actions.    thanks

## 2023-08-18 NOTE — TELEPHONE ENCOUNTER
Please review urine culture on 8/16/23. Surgery with Abhishek Montalvo on 8/22/23 for a Cysto, Bladder Botox Thanks.      PATIENT RESIDES AT [facility]

## 2023-08-22 ENCOUNTER — ANESTHESIA (OUTPATIENT)
Dept: OPERATING ROOM | Age: 68
End: 2023-08-22
Payer: MEDICARE

## 2023-08-22 ENCOUNTER — ANESTHESIA EVENT (OUTPATIENT)
Dept: OPERATING ROOM | Age: 68
End: 2023-08-22
Payer: MEDICARE

## 2023-08-22 ENCOUNTER — HOSPITAL ENCOUNTER (OUTPATIENT)
Age: 68
Setting detail: OUTPATIENT SURGERY
Discharge: HOME | End: 2023-08-22
Attending: UROLOGY | Admitting: UROLOGY
Payer: MEDICARE

## 2023-08-22 VITALS
DIASTOLIC BLOOD PRESSURE: 68 MMHG | BODY MASS INDEX: 29.3 KG/M2 | HEIGHT: 64 IN | SYSTOLIC BLOOD PRESSURE: 139 MMHG | RESPIRATION RATE: 16 BRPM | TEMPERATURE: 97.6 F | WEIGHT: 171.6 LBS | HEART RATE: 75 BPM | OXYGEN SATURATION: 95 %

## 2023-08-22 DIAGNOSIS — G89.18 POST-OP PAIN: Primary | ICD-10-CM

## 2023-08-22 PROCEDURE — 6360000002 HC RX W HCPCS: Performed by: UROLOGY

## 2023-08-22 PROCEDURE — 7100000010 HC PHASE II RECOVERY - FIRST 15 MIN: Performed by: UROLOGY

## 2023-08-22 PROCEDURE — 3700000000 HC ANESTHESIA ATTENDED CARE: Performed by: UROLOGY

## 2023-08-22 PROCEDURE — 3700000001 HC ADD 15 MINUTES (ANESTHESIA): Performed by: UROLOGY

## 2023-08-22 PROCEDURE — 2709999900 HC NON-CHARGEABLE SUPPLY: Performed by: UROLOGY

## 2023-08-22 PROCEDURE — 3600000012 HC SURGERY LEVEL 2 ADDTL 15MIN: Performed by: UROLOGY

## 2023-08-22 PROCEDURE — 6360000002 HC RX W HCPCS: Performed by: NURSE ANESTHETIST, CERTIFIED REGISTERED

## 2023-08-22 PROCEDURE — 2580000003 HC RX 258: Performed by: UROLOGY

## 2023-08-22 PROCEDURE — 6370000000 HC RX 637 (ALT 250 FOR IP): Performed by: UROLOGY

## 2023-08-22 PROCEDURE — 7100000011 HC PHASE II RECOVERY - ADDTL 15 MIN: Performed by: UROLOGY

## 2023-08-22 PROCEDURE — 3600000002 HC SURGERY LEVEL 2 BASE: Performed by: UROLOGY

## 2023-08-22 RX ORDER — FENTANYL CITRATE 50 UG/ML
INJECTION, SOLUTION INTRAMUSCULAR; INTRAVENOUS PRN
Status: DISCONTINUED | OUTPATIENT
Start: 2023-08-22 | End: 2023-08-22 | Stop reason: SDUPTHER

## 2023-08-22 RX ORDER — DOXYCYCLINE 100 MG/1
100 CAPSULE ORAL 2 TIMES DAILY
Qty: 10 CAPSULE | Refills: 0 | Status: SHIPPED | OUTPATIENT
Start: 2023-08-22 | End: 2023-08-27

## 2023-08-22 RX ORDER — HYDROCODONE BITARTRATE AND ACETAMINOPHEN 5; 325 MG/1; MG/1
1 TABLET ORAL EVERY 6 HOURS PRN
Qty: 12 TABLET | Refills: 0 | Status: SHIPPED | OUTPATIENT
Start: 2023-08-22 | End: 2023-08-25

## 2023-08-22 RX ORDER — SODIUM CHLORIDE 9 MG/ML
INJECTION, SOLUTION INTRAVENOUS PRN
Status: DISCONTINUED | OUTPATIENT
Start: 2023-08-22 | End: 2023-08-22 | Stop reason: HOSPADM

## 2023-08-22 RX ORDER — PROPOFOL 10 MG/ML
INJECTION, EMULSION INTRAVENOUS PRN
Status: DISCONTINUED | OUTPATIENT
Start: 2023-08-22 | End: 2023-08-22 | Stop reason: SDUPTHER

## 2023-08-22 RX ORDER — LIDOCAINE HYDROCHLORIDE 20 MG/ML
INJECTION, SOLUTION INTRAVENOUS PRN
Status: DISCONTINUED | OUTPATIENT
Start: 2023-08-22 | End: 2023-08-22 | Stop reason: SDUPTHER

## 2023-08-22 RX ORDER — PHENAZOPYRIDINE HYDROCHLORIDE 200 MG/1
200 TABLET, FILM COATED ORAL 3 TIMES DAILY PRN
Qty: 9 TABLET | Refills: 0 | Status: SHIPPED | OUTPATIENT
Start: 2023-08-22

## 2023-08-22 RX ORDER — MIDAZOLAM HYDROCHLORIDE 1 MG/ML
INJECTION INTRAMUSCULAR; INTRAVENOUS PRN
Status: DISCONTINUED | OUTPATIENT
Start: 2023-08-22 | End: 2023-08-22 | Stop reason: SDUPTHER

## 2023-08-22 RX ORDER — HYDROCODONE BITARTRATE AND ACETAMINOPHEN 5; 325 MG/1; MG/1
1 TABLET ORAL ONCE
Status: COMPLETED | OUTPATIENT
Start: 2023-08-22 | End: 2023-08-22

## 2023-08-22 RX ORDER — SODIUM CHLORIDE 0.9 % (FLUSH) 0.9 %
5-40 SYRINGE (ML) INJECTION PRN
Status: DISCONTINUED | OUTPATIENT
Start: 2023-08-22 | End: 2023-08-22 | Stop reason: HOSPADM

## 2023-08-22 RX ORDER — SODIUM CHLORIDE 0.9 % (FLUSH) 0.9 %
5-40 SYRINGE (ML) INJECTION EVERY 12 HOURS SCHEDULED
Status: DISCONTINUED | OUTPATIENT
Start: 2023-08-22 | End: 2023-08-22 | Stop reason: HOSPADM

## 2023-08-22 RX ADMIN — MIDAZOLAM 2 MG: 1 INJECTION INTRAMUSCULAR; INTRAVENOUS at 13:46

## 2023-08-22 RX ADMIN — PROPOFOL 40 MG: 10 INJECTION, EMULSION INTRAVENOUS at 13:52

## 2023-08-22 RX ADMIN — SODIUM CHLORIDE: 9 INJECTION, SOLUTION INTRAVENOUS at 13:29

## 2023-08-22 RX ADMIN — LIDOCAINE HYDROCHLORIDE 100 MG: 20 INJECTION, SOLUTION INTRAVENOUS at 13:49

## 2023-08-22 RX ADMIN — FENTANYL CITRATE 50 MCG: 50 INJECTION, SOLUTION INTRAMUSCULAR; INTRAVENOUS at 13:47

## 2023-08-22 RX ADMIN — PROPOFOL 40 MG: 10 INJECTION, EMULSION INTRAVENOUS at 13:49

## 2023-08-22 RX ADMIN — Medication 2000 MG: at 13:41

## 2023-08-22 RX ADMIN — HYDROCODONE BITARTRATE AND ACETAMINOPHEN 1 TABLET: 5; 325 TABLET ORAL at 14:48

## 2023-08-22 ASSESSMENT — PAIN DESCRIPTION - DESCRIPTORS: DESCRIPTORS: ACHING

## 2023-08-22 ASSESSMENT — PAIN - FUNCTIONAL ASSESSMENT: PAIN_FUNCTIONAL_ASSESSMENT: 0-10

## 2023-08-22 ASSESSMENT — PAIN SCALES - GENERAL
PAINLEVEL_OUTOF10: 8
PAINLEVEL_OUTOF10: 8
PAINLEVEL_OUTOF10: 9

## 2023-08-22 NOTE — PROGRESS NOTES
Pt has met discharge criteria and states she is ready for discharge to home. IV removed, gauze and tape applied. Dressed in own clothes and personal belongings gathered. Discharge instructions (with opioid medication education information) given to pt and family; pt and family verbalized understanding of discharge instructions, prescriptions and follow up appointments. Pt transported to discharge lobby by [facility] staff.

## 2023-08-22 NOTE — PROGRESS NOTES
Patient oriented to Same Day department and admitted to Same Day Surgery room 4. Patient verbalized approval for first name, last initial with physician name on unit whiteboard. Plan of care reviewed with patient. Patient room whiteboard filled out and discussed with patient and responsible adult. Patient and responsible adult offered Same Day Welcome Packet to review. Call light in reach. Bed in lowest position, locked, with one bed rail up. SCDs and warming blanket in place. Appropriate arm bands on patient. Bathroom offered. All questions and concerns of patient addressed. Meds to Beds:   Patient informed of . [facility] to [facility] program during admission.  Patient has declined use of program.

## 2023-08-22 NOTE — DISCHARGE INSTRUCTIONS
Call your doctor for the following:   Chills   Temperature greater than 101   Pain that is not tolerable despite taking pain medicine as ordered   Inability to urinate >12 hours/ or a non-draining norton         No alcoholic beverages, no driving or operating machinery, no making important decisions for 24 hours. Take your prescribed medications (if any) as instructed. You may have a normal diet but should eat lightly on the day of surgery. Drink plenty of fluids. You may notice some burning or blood with urination, this is normal and should improve over next few days. You may also take motrin/ibuprofen for pain control, you can alternate this with your other pain medications. Be sure to take over the counter stool softeners if you notice constipation or hard stools. Follow up with Dr. Teressa Lew, office will arrange.      Resume blood thinners in 1 days

## 2023-08-22 NOTE — ANESTHESIA PRE PROCEDURE
Department of Anesthesiology  Preprocedure Note       Name:  Vaughn Junior   Age:  79 y.o.  :  1955                                          MRN:  [id number]         Date:  2023      Surgeon: Chey Wilhelm):  Raoul Lynne MD    Procedure: Procedure(s):  CYSTOSCOPY WITH BLADDER BOTOX 200 UNITS    Medications prior to admission:   Prior to Admission medications    Medication Sig Start Date End Date Taking?  Authorizing Provider   fluticasone-umeclidin-vilant (TRELEGY ELLIPTA) 404-69.1-30 MCG/ACT AEPB inhaler Inhale 1 puff into the lungs daily 23  Neto Taylor MD   Cholecalciferol (VITAMIN D3) 50 MCG ( UT) CAPS Take 1 capsule by mouth daily  Patient not taking: Reported on 2023    Historical Provider, MD   magnesium hydroxide (MILK OF MAGNESIA) 400 MG/5ML suspension Take 30 mLs by mouth daily as needed for Constipation    Historical Provider, MD   polyethylene glycol (GLYCOLAX) 17 g packet Take 1 packet by mouth daily as needed for Constipation    Historical Provider, MD   ciprofloxacin (CIPRO) 500 MG tablet Take 1 tablet by mouth 2 times daily  Patient not taking: Reported on 2023   Raoul Lynne MD   pantoprazole (PROTONIX) 40 MG tablet Take 1 tablet by mouth 2 times daily (before meals) 23   Dina Fuentes PA-C   sucralfate (CARAFATE) 1 GM tablet Take 1 tablet by mouth 4 times daily 23   Dina Fuentes PA-C   buPROPion (WELLBUTRIN XL) 150 MG extended release tablet Take 1 tablet by mouth every morning 23   Historical Provider, MD   Albuterol (VENTOLIN IN) Inhale 90 mcg into the lungs every 6 hours as needed (wheezing)    Historical Provider, MD   docusate sodium (COLACE) 100 MG capsule Take 1 capsule by mouth 2 times daily as needed for Constipation  Patient not taking: Reported on 2023   BA Ramirez - CNP   ciprofloxacin (CIPRO) 500 MG tablet Take 1 tablet by mouth 2 times daily  Patient not taking: Reported on

## 2023-08-22 NOTE — ANESTHESIA POSTPROCEDURE EVALUATION
Department of Anesthesiology  Postprocedure Note    Patient: Ivan Eden  MRN: [id number]  YOB: 1955  Date of evaluation: 8/22/2023      Procedure Summary     Date: 08/22/23 Room / Location: [address]    Anesthesia Start: 1343 Anesthesia Stop: 1410    Procedure: CYSTOSCOPY WITH BLADDER BOTOX 200 UNITS (Bladder) Diagnosis:       Urge incontinence of urine      (Urge incontinence of urine [N39.41])    Surgeons: Eloisa Sánchez MD Responsible Provider: Edis Solorio MD    Anesthesia Type: MAC ASA Status: 3          Anesthesia Type: No value filed.     Merline Phase I: Merline Score: 10    Merline Phase II:        Anesthesia Post Evaluation    Patient location during evaluation: PACU  Patient participation: complete - patient participated  Level of consciousness: awake and alert  Airway patency: patent  Nausea & Vomiting: no nausea  Complications: no  Cardiovascular status: blood pressure returned to baseline and hemodynamically stable  Respiratory status: acceptable and spontaneous ventilation  Hydration status: euvolemic  Pain management: adequate

## 2023-08-22 NOTE — H&P
History and Physical    Patient:  Zbigniew Novak  MRN: [id number]  YOB: 1955    CHIEF COMPLAINT:  oab    HISTORY OF PRESENT ILLNESS:   The patient is a 79 y.o. female who presents with as above, here for surgery    Patient's old records, notes and chart reviewed and summarized above.      Past Medical History:    Past Medical History:   Diagnosis Date    Acute renal failure with acute cortical necrosis ([address]) 12/21/2019    Allergic rhinitis     Arthritis     back, arms, hips    Bipolar 1 disorder ([address])     Blood circulation, collateral     Cancer ([address]) 2011    cancerous polyps removed - Dr. Porfirio Jo    Cataract     Colon polyps     COPD (chronic obstructive pulmonary disease) ([address]) 07/24/2014    Coronary vasospasm ([address]) 08/17/2019    Depression     Diverticulitis of colon     GERD (gastroesophageal reflux disease)     Glaucoma     Hearing loss     Hiatal hernia     History of arterial ischemic stroke 07/20/2019    History of colonoscopy 2002    History of kidney stones     Hx of blood clots 06/17/2014    PE and collar bone area after shoulder surgery    Hyperlipidemia     Hypertension     Liver disease     enlarged liver - damaged with alcohol in past but no cirrhosis per patient    Pneumonia 07/24/2014    Seasonal allergies     sneezing    Sexual problems     Suicidal thoughts     2015 admitted to 4E from [facility]    Thyroid disease     Urinary incontinence     Vomiting     Wears dentures     Wears glasses        Past Surgical History:    Past Surgical History:   Procedure Laterality Date    ABDOMEN SURGERY      x4 removed cysts and ovary removed    CARDIAC SURGERY      heart caths, no stents    CARPAL TUNNEL RELEASE Bilateral 2016    CHOLECYSTECTOMY, LAPAROSCOPIC  6/12/15    Dr. Lee Echols    COLONOSCOPY  2011    CYSTOSCOPY N/A 2/10/2022    CYSTOSCOPY URETHRAL IMPLANT INJECTION performed by Nasreen Craig MD at [facility] N/A 12/27/2022    Cystoscopy Bladder Botox 200 units Left Stent

## 2023-08-22 NOTE — PROGRESS NOTES
Pt returned to [facility] room 4. Vitals and assessment as charted. 0.9 infusing, @700ml to count from PACU. Pt has crackers and water. Family at the bedside. Pt and family verbalized understanding of discharge criteria and call light use. Call light in reach.

## 2023-09-11 NOTE — TELEPHONE ENCOUNTER
Pharmacy notified. Quality 130: Documentation Of Current Medications In The Medical Record: Current Medications Documented Detail Level: Detailed Quality 137: Melanoma: Continuity Of Care - Recall System: Patient information entered into a recall system that includes: target date for the next exam specified AND a process to follow up with patients regarding missed or unscheduled appointments Quality 226: Preventive Care And Screening: Tobacco Use: Screening And Cessation Intervention: Patient screened for tobacco use and is an ex/non-smoker Quality 110: Preventive Care And Screening: Influenza Immunization: Influenza Immunization Administered during Influenza season

## 2023-09-12 ENCOUNTER — APPOINTMENT (OUTPATIENT)
Dept: GENERAL RADIOLOGY | Age: 68
End: 2023-09-12
Payer: MEDICARE

## 2023-09-12 ENCOUNTER — APPOINTMENT (OUTPATIENT)
Dept: CT IMAGING | Age: 68
End: 2023-09-12
Payer: MEDICARE

## 2023-09-12 ENCOUNTER — HOSPITAL ENCOUNTER (EMERGENCY)
Age: 68
Discharge: HOME | End: 2023-09-12
Attending: EMERGENCY MEDICINE
Payer: MEDICARE

## 2023-09-12 VITALS
RESPIRATION RATE: 16 BRPM | HEART RATE: 70 BPM | OXYGEN SATURATION: 98 % | TEMPERATURE: 98.5 F | SYSTOLIC BLOOD PRESSURE: 145 MMHG | BODY MASS INDEX: 29.7 KG/M2 | WEIGHT: 173 LBS | DIASTOLIC BLOOD PRESSURE: 75 MMHG

## 2023-09-12 DIAGNOSIS — S37.012A HEMATOMA OF LEFT KIDNEY, INITIAL ENCOUNTER: ICD-10-CM

## 2023-09-12 DIAGNOSIS — N39.0 URINARY TRACT INFECTION WITH HEMATURIA, SITE UNSPECIFIED: Primary | ICD-10-CM

## 2023-09-12 DIAGNOSIS — R31.9 URINARY TRACT INFECTION WITH HEMATURIA, SITE UNSPECIFIED: Primary | ICD-10-CM

## 2023-09-12 DIAGNOSIS — R33.9 URINARY RETENTION: ICD-10-CM

## 2023-09-12 LAB
ALBUMIN SERPL BCG-MCNC: 3.8 G/DL (ref 3.5–5.1)
ALP SERPL-CCNC: 98 U/L (ref 38–126)
ALT SERPL W/O P-5'-P-CCNC: 15 U/L (ref 11–66)
AMORPH SED URNS QL MICRO: ABNORMAL
ANION GAP SERPL CALC-SCNC: 9 MEQ/L (ref 8–16)
AST SERPL-CCNC: 10 U/L (ref 5–40)
BACTERIA URNS QL MICRO: ABNORMAL /HPF
BASOPHILS ABSOLUTE: 0 THOU/MM3 (ref 0–0.1)
BASOPHILS NFR BLD AUTO: 0.3 %
BILIRUB SERPL-MCNC: 0.2 MG/DL (ref 0.3–1.2)
BILIRUB UR QL STRIP.AUTO: NEGATIVE
BUN SERPL-MCNC: 14 MG/DL (ref 7–22)
CALCIUM SERPL-MCNC: 10.2 MG/DL (ref 8.5–10.5)
CASTS #/AREA URNS LPF: ABNORMAL /LPF
CHARACTER UR: ABNORMAL
CHLORIDE SERPL-SCNC: 96 MEQ/L (ref 98–111)
CO2 SERPL-SCNC: 24 MEQ/L (ref 23–33)
COLOR: ABNORMAL
CREAT SERPL-MCNC: 0.7 MG/DL (ref 0.4–1.2)
CRYSTALS URNS MICRO: ABNORMAL
DEPRECATED RDW RBC AUTO: 44.8 FL (ref 35–45)
EOSINOPHIL NFR BLD AUTO: 1 %
EOSINOPHILS ABSOLUTE: 0.1 THOU/MM3 (ref 0–0.4)
EPITHELIAL CELLS, UA: ABNORMAL /HPF
ERYTHROCYTE [DISTWIDTH] IN BLOOD BY AUTOMATED COUNT: 14 % (ref 11.5–14.5)
FLUAV RNA RESP QL NAA+PROBE: NOT DETECTED
FLUBV RNA RESP QL NAA+PROBE: NOT DETECTED
GFR SERPL CREATININE-BSD FRML MDRD: > 60 ML/MIN/1.73M2
GLUCOSE SERPL-MCNC: 339 MG/DL (ref 70–108)
GLUCOSE UR QL STRIP.AUTO: >= 1000 MG/DL
HCT VFR BLD AUTO: 40.6 % (ref 37–47)
HGB BLD-MCNC: 12.7 GM/DL (ref 12–16)
HGB UR QL STRIP.AUTO: ABNORMAL
IMM GRANULOCYTES # BLD AUTO: 0.05 THOU/MM3 (ref 0–0.07)
IMM GRANULOCYTES NFR BLD AUTO: 0.6 %
KETONES UR QL STRIP.AUTO: NEGATIVE
LIPASE SERPL-CCNC: 55.1 U/L (ref 5.6–51.3)
LYMPHOCYTES ABSOLUTE: 2.2 THOU/MM3 (ref 1–4.8)
LYMPHOCYTES NFR BLD AUTO: 24.5 %
MCH RBC QN AUTO: 27.7 PG (ref 26–33)
MCHC RBC AUTO-ENTMCNC: 31.3 GM/DL (ref 32.2–35.5)
MCV RBC AUTO: 88.6 FL (ref 81–99)
MISCELLANEOUS LAB TEST RESULT: ABNORMAL
MONOCYTES ABSOLUTE: 0.5 THOU/MM3 (ref 0.4–1.3)
MONOCYTES NFR BLD AUTO: 5.6 %
MUCOUS THREADS URNS QL MICRO: ABNORMAL
NEUTROPHILS NFR BLD AUTO: 68 %
NITRITE UR QL STRIP: NEGATIVE
NRBC BLD AUTO-RTO: 0 /100 WBC
OSMOLALITY SERPL CALC.SUM OF ELEC: 272.8 MOSMOL/KG (ref 275–300)
PH UR STRIP.AUTO: 8 [PH] (ref 5–9)
PLATELET # BLD AUTO: 340 THOU/MM3 (ref 130–400)
PMV BLD AUTO: 9.5 FL (ref 9.4–12.4)
POTASSIUM SERPL-SCNC: 4.8 MEQ/L (ref 3.5–5.2)
PROT SERPL-MCNC: 6.9 G/DL (ref 6.1–8)
PROT UR STRIP.AUTO-MCNC: ABNORMAL MG/DL
RBC # BLD AUTO: 4.58 MILL/MM3 (ref 4.2–5.4)
RBC URINE: ABNORMAL /HPF
RENAL EPI CELLS #/AREA URNS HPF: ABNORMAL /[HPF]
SARS-COV-2 RNA RESP QL NAA+PROBE: NOT DETECTED
SEGMENTED NEUTROPHILS ABSOLUTE COUNT: 6.1 THOU/MM3 (ref 1.8–7.7)
SODIUM SERPL-SCNC: 129 MEQ/L (ref 135–145)
SP GR UR REFRACT.AUTO: 1.01 (ref 1–1.03)
TROPONIN, HIGH SENSITIVITY: 13 NG/L (ref 0–12)
TROPONIN, HIGH SENSITIVITY: 14 NG/L (ref 0–12)
UROBILINOGEN, URINE: 0.2 EU/DL (ref 0–1)
WBC # BLD AUTO: 8.9 THOU/MM3 (ref 4.8–10.8)
WBC #/AREA URNS HPF: ABNORMAL /HPF
WBC #/AREA URNS HPF: ABNORMAL /[HPF]
YEAST LIKE FUNGI URNS QL MICRO: ABNORMAL

## 2023-09-12 PROCEDURE — 99285 EMERGENCY DEPT VISIT HI MDM: CPT

## 2023-09-12 PROCEDURE — 83690 ASSAY OF LIPASE: CPT

## 2023-09-12 PROCEDURE — 85025 COMPLETE CBC W/AUTO DIFF WBC: CPT

## 2023-09-12 PROCEDURE — 93005 ELECTROCARDIOGRAM TRACING: CPT | Performed by: EMERGENCY MEDICINE

## 2023-09-12 PROCEDURE — 93010 ELECTROCARDIOGRAM REPORT: CPT | Performed by: INTERNAL MEDICINE

## 2023-09-12 PROCEDURE — 6370000000 HC RX 637 (ALT 250 FOR IP): Performed by: EMERGENCY MEDICINE

## 2023-09-12 PROCEDURE — 81001 URINALYSIS AUTO W/SCOPE: CPT

## 2023-09-12 PROCEDURE — 74018 RADEX ABDOMEN 1 VIEW: CPT

## 2023-09-12 PROCEDURE — 80053 COMPREHEN METABOLIC PANEL: CPT

## 2023-09-12 PROCEDURE — 74177 CT ABD & PELVIS W/CONTRAST: CPT

## 2023-09-12 PROCEDURE — 6360000004 HC RX CONTRAST MEDICATION: Performed by: EMERGENCY MEDICINE

## 2023-09-12 PROCEDURE — 51798 US URINE CAPACITY MEASURE: CPT

## 2023-09-12 PROCEDURE — 36415 COLL VENOUS BLD VENIPUNCTURE: CPT

## 2023-09-12 PROCEDURE — 84484 ASSAY OF TROPONIN QUANT: CPT

## 2023-09-12 PROCEDURE — 87636 SARSCOV2 & INF A&B AMP PRB: CPT

## 2023-09-12 PROCEDURE — 87086 URINE CULTURE/COLONY COUNT: CPT

## 2023-09-12 RX ORDER — HYDROCODONE BITARTRATE AND ACETAMINOPHEN 5; 325 MG/1; MG/1
1 TABLET ORAL ONCE
Status: COMPLETED | OUTPATIENT
Start: 2023-09-12 | End: 2023-09-12

## 2023-09-12 RX ORDER — CEPHALEXIN 250 MG/1
500 CAPSULE ORAL ONCE
Status: COMPLETED | OUTPATIENT
Start: 2023-09-12 | End: 2023-09-12

## 2023-09-12 RX ORDER — SENNOSIDES 8.8 MG/5ML
5 LIQUID ORAL NIGHTLY
Status: DISCONTINUED | OUTPATIENT
Start: 2023-09-12 | End: 2023-09-12 | Stop reason: HOSPADM

## 2023-09-12 RX ORDER — BISACODYL 10 MG
10 SUPPOSITORY, RECTAL RECTAL DAILY PRN
Status: DISCONTINUED | OUTPATIENT
Start: 2023-09-12 | End: 2023-09-12 | Stop reason: HOSPADM

## 2023-09-12 RX ADMIN — CEPHALEXIN 500 MG: 250 CAPSULE ORAL at 15:49

## 2023-09-12 RX ADMIN — IOPAMIDOL 80 ML: 755 INJECTION, SOLUTION INTRAVENOUS at 13:23

## 2023-09-12 RX ADMIN — HYDROCODONE BITARTRATE AND ACETAMINOPHEN 1 TABLET: 5; 325 TABLET ORAL at 12:06

## 2023-09-12 ASSESSMENT — PAIN DESCRIPTION - LOCATION: LOCATION: ABDOMEN

## 2023-09-12 ASSESSMENT — PAIN SCALES - GENERAL: PAINLEVEL_OUTOF10: 8

## 2023-09-12 ASSESSMENT — PAIN - FUNCTIONAL ASSESSMENT: PAIN_FUNCTIONAL_ASSESSMENT: 0-10

## 2023-09-12 NOTE — PLAN OF CARE
MARCIA EPSTEIN [facility] Urology via perfect serve for recommendations. Patient's UA with moderate blood, moderate leukocytes, 10-15 RBC, 15-25 WBC, debris, threads of mucous, and many bacteria. Also with moderate budding yeast. Culture pending. Last culture scanned in on 8/16/2023. Remarkable for Proteus <10,000. No sensitivities. Recommend Augmentin. Patient with reported bladder fullness and tenderness with palpation. She is also s/p botox 200 units installation on 8/22/2023. She was able to urinate. PVR of 129. Recommended norton catheter placement if patient was still complaining of suprapubic pain and pressure. Finally, patient with hx of left perinephric hematoma occurring after a URS in 1/2023. Per CT imaging, this is increasing in size. Patient's hemoglobin and kidney function is stable. Recommend close outpatient follow-up. No surgical intervention from a urology standpoint at this time. Recommend patient schedule office visit in the near future for further evaluation.        Case discussed with Dr. Se Allen  Call with any other questions, comments, or concerns

## 2023-09-12 NOTE — ED NOTES
Patient resting in bed. Respirations easy and unlabored. No distress noted. Call light within reach.        Ap Berkowitz RN  09/12/23 [id number]

## 2023-09-12 NOTE — ED NOTES
Patient resting in bed. Respirations easy and unlabored. No distress noted. Call light within reach.        Viola Erazo RN  09/12/23 4082

## 2023-09-12 NOTE — ED PROVIDER NOTES
[facility] ENCOUNTER          Pt Name: Ivan Eden  MRN: [id number]  [address] [place] 1955  Date of evaluation: 9/12/2023  ED Resident: Stephan Adorno MD  ED Attending: Dr. Karthik Erickson       Chief Complaint   Patient presents with    Constipation     History obtained from the patient. HISTORY OF PRESENT ILLNESS    HPI  Ivan Eden is a 79 y.o. female who presents to the emergency department for evaluation of constipation    Patient states that she has only had small, liquidy bowel movements over the past 5 days. She uses MiraLAX as needed and has been trying to use it, also has been eating regularly. No shortness of breath, fever, nausea, vomiting. Patient denies dysuria, but states that she recently had a UTI, also had seen Dr. Lucia Morales with urology and received bladder Botox injections on 22 August.    The patient has no other acute complaints at this time.     PAST MEDICAL AND SURGICAL HISTORY     Past Medical History:   Diagnosis Date    Acute renal failure with acute cortical necrosis ([facility]) 12/21/2019    Allergic rhinitis     Arthritis     back, arms, hips    Bipolar 1 disorder ([address])     Blood circulation, collateral     Cancer ([address]) 2011    cancerous polyps removed - Dr. Italia Leiva    Cataract     Colon polyps     COPD (chronic obstructive pulmonary disease) ([address]) 07/24/2014    Coronary vasospasm ([address]) 08/17/2019    Depression     Diverticulitis of colon     GERD (gastroesophageal reflux disease)     Glaucoma     Hearing loss     Hiatal hernia     History of arterial ischemic stroke 07/20/2019    History of colonoscopy 2002    History of kidney stones     Hx of blood clots 06/17/2014    PE and collar bone area after shoulder surgery    Hyperlipidemia     Hypertension     Liver disease     enlarged liver - damaged with alcohol in past but no cirrhosis per patient    Pneumonia 07/24/2014    Seasonal allergies     sneezing

## 2023-09-12 NOTE — ED NOTES
Bladder scan 432ml. Patient resting in bed. Respirations easy and unlabored. No distress noted. Call light within reach.        Ekaterina Louise RN  09/12/23 1407

## 2023-09-12 NOTE — ED NOTES
Patient resting in bed. Respirations easy and unlabored. No distress noted. Call light within reach.        Hunter Iniguez RN  09/12/23 [id number]

## 2023-09-12 NOTE — ED NOTES
Patient resting in bed. Respirations easy and unlabored. No distress noted. Call light within reach.        Gabo Frias RN  09/12/23 [id number]

## 2023-09-13 ENCOUNTER — OFFICE VISIT (OUTPATIENT)
Dept: UROLOGY | Age: 68
End: 2023-09-13
Payer: MEDICARE

## 2023-09-13 ENCOUNTER — TELEPHONE (OUTPATIENT)
Dept: UROLOGY | Age: 68
End: 2023-09-13

## 2023-09-13 VITALS — WEIGHT: 173 LBS | HEIGHT: 64 IN | TEMPERATURE: 97.2 F | BODY MASS INDEX: 29.53 KG/M2

## 2023-09-13 DIAGNOSIS — S37.019A PERINEPHRIC HEMATOMA: Primary | ICD-10-CM

## 2023-09-13 DIAGNOSIS — K59.00 CONSTIPATION, UNSPECIFIED CONSTIPATION TYPE: ICD-10-CM

## 2023-09-13 DIAGNOSIS — I20.9 ANGINA, CLASS II (HCC): Primary | ICD-10-CM

## 2023-09-13 DIAGNOSIS — I50.32 CHRONIC DIASTOLIC HEART FAILURE (HCC): ICD-10-CM

## 2023-09-13 DIAGNOSIS — N39.0 URINARY TRACT INFECTION WITHOUT HEMATURIA, SITE UNSPECIFIED: ICD-10-CM

## 2023-09-13 DIAGNOSIS — N39.41 URGE INCONTINENCE OF URINE: ICD-10-CM

## 2023-09-13 DIAGNOSIS — N20.0 NEPHROLITHIASIS: ICD-10-CM

## 2023-09-13 LAB
BACTERIA UR CULT: ABNORMAL
ORGANISM: ABNORMAL

## 2023-09-13 PROCEDURE — 1090F PRES/ABSN URINE INCON ASSESS: CPT | Performed by: NURSE PRACTITIONER

## 2023-09-13 PROCEDURE — 0509F URINE INCON PLAN DOCD: CPT | Performed by: NURSE PRACTITIONER

## 2023-09-13 PROCEDURE — 3017F COLORECTAL CA SCREEN DOC REV: CPT | Performed by: NURSE PRACTITIONER

## 2023-09-13 PROCEDURE — G8427 DOCREV CUR MEDS BY ELIG CLIN: HCPCS | Performed by: NURSE PRACTITIONER

## 2023-09-13 PROCEDURE — G8400 PT W/DXA NO RESULTS DOC: HCPCS | Performed by: NURSE PRACTITIONER

## 2023-09-13 PROCEDURE — 1123F ACP DISCUSS/DSCN MKR DOCD: CPT | Performed by: NURSE PRACTITIONER

## 2023-09-13 PROCEDURE — 99214 OFFICE O/P EST MOD 30 MIN: CPT | Performed by: NURSE PRACTITIONER

## 2023-09-13 PROCEDURE — 4004F PT TOBACCO SCREEN RCVD TLK: CPT | Performed by: NURSE PRACTITIONER

## 2023-09-13 PROCEDURE — G8417 CALC BMI ABV UP PARAM F/U: HCPCS | Performed by: NURSE PRACTITIONER

## 2023-09-13 RX ORDER — DOCUSATE SODIUM 100 MG/1
100 CAPSULE, LIQUID FILLED ORAL 2 TIMES DAILY PRN
Qty: 30 CAPSULE | Refills: 2 | Status: SHIPPED | OUTPATIENT
Start: 2023-09-13

## 2023-09-13 RX ORDER — POLYETHYLENE GLYCOL 3350 17 G/17G
17 POWDER, FOR SOLUTION ORAL DAILY PRN
Qty: 527 G | Refills: 2 | Status: SHIPPED | OUTPATIENT
Start: 2023-09-13

## 2023-09-13 RX ORDER — CEPHALEXIN 500 MG/1
500 CAPSULE ORAL 2 TIMES DAILY
Qty: 14 CAPSULE | Refills: 0 | Status: SHIPPED | OUTPATIENT
Start: 2023-09-13 | End: 2023-09-20

## 2023-09-13 RX ORDER — CEPHALEXIN 500 MG/1
500 CAPSULE ORAL 2 TIMES DAILY
COMMUNITY
End: 2023-09-13

## 2023-09-13 NOTE — PATIENT INSTRUCTIONS
Recommend stool softener two times daily and miralax daily. Des Mccabe to remove catheter on Monday, if unable to urinate within 8 hours facility will replace. Follow-up 2-3 weeks for re-evaluation.

## 2023-09-13 NOTE — TELEPHONE ENCOUNTER
If the catheter needs to be replaced [facility] needs orders for supplies for catheter sent to [facility] and a nursing communication order faxed to them [phone]. Tayler Holland is aware keflex was sent to the pharmacy.

## 2023-09-13 NOTE — PROGRESS NOTES
(WELLBUTRIN XL) 150 MG extended release tablet Take 1 tablet by mouth every morning      Albuterol (VENTOLIN IN) Inhale 90 mcg into the lungs every 6 hours as needed (wheezing)      potassium chloride (KLOR-CON M) 20 MEQ extended release tablet Take 1 tablet by mouth daily      QUEtiapine (SEROQUEL) 300 MG tablet Take 2 tablets by mouth nightly 2 tab      carvedilol (COREG) 3.125 MG tablet TAKE 1 TABLET BY MOUTH TWICE DAILY      aspirin 81 MG EC tablet Take 1 tablet by mouth daily 30 tablet 3    albuterol (PROVENTIL) (2.5 MG/3ML) 0.083% nebulizer solution Take 3 mLs by nebulization every 6 hours as needed for Wheezing 120 each 0    acetaminophen (TYLENOL) 325 MG tablet Take by mouth every 6 hours as needed for Pain 2 tab      levothyroxine (SYNTHROID) 75 MCG tablet Take 1 tablet by mouth daily everyday except Sunday take 150 mcg. 30 tablet 0    fluticasone (FLONASE) 50 MCG/ACT nasal spray 1 spray by Each Nostril route 2 times daily 1 Bottle 0    ferrous sulfate 325 (65 Fe) MG tablet Take 1 tablet by mouth 2 times daily 30 tablet 0    folic acid (FOLVITE) 1 MG tablet Take 1 tablet by mouth daily      ARIPiprazole (ABILIFY) 2 MG tablet Take 1 tablet by mouth daily      atorvastatin (LIPITOR) 40 MG tablet Take 1 tablet by mouth nightly      escitalopram (LEXAPRO) 20 MG tablet Take 1 tablet by mouth daily      fenofibrate 160 MG tablet Take 145 mg by mouth daily      traZODone (DESYREL) 100 MG tablet Take 2 tablets by mouth nightly 30 tablet 0     No current facility-administered medications for this visit.        Past Medical History  Alana Seals  has a past medical history of Acute renal failure with acute cortical necrosis ([address]), Allergic rhinitis, Arthritis, Bipolar 1 disorder ([address]), Blood circulation, collateral, Cancer ([address]), Cataract, Colon polyps, COPD (chronic obstructive pulmonary disease) ([address]), Coronary vasospasm ([address]), Depression, Diverticulitis of colon, GERD (gastroesophageal reflux disease), Glaucoma, Hearing

## 2023-09-13 NOTE — TELEPHONE ENCOUNTER
Patient did not receive the prescription from ER for keflex. Do you want this prescribed?     Ugo Guadalupe from [facility] called [phone]

## 2023-09-14 LAB
EKG ATRIAL RATE: 70 BPM
EKG P AXIS: 53 DEGREES
EKG P-R INTERVAL: 162 MS
EKG Q-T INTERVAL: 406 MS
EKG QRS DURATION: 82 MS
EKG QTC CALCULATION (BAZETT): 438 MS
EKG R AXIS: 2 DEGREES
EKG T AXIS: 72 DEGREES
EKG VENTRICULAR RATE: 70 BPM

## 2023-09-15 RX ORDER — IRRIGATION SET
1 TRAY MISCELLANEOUS AS NEEDED
Qty: 16 KIT | Refills: 3 | Status: SHIPPED | OUTPATIENT
Start: 2023-09-15

## 2023-09-15 RX ORDER — DRAINAGE BAG
1 EACH MISCELLANEOUS AS NEEDED
Qty: 1 EACH | Refills: 0 | Status: SHIPPED | OUTPATIENT
Start: 2023-09-15

## 2023-09-15 RX ORDER — MAGNESIUM HYDROXIDE 1200 MG/15ML
LIQUID ORAL
Qty: 500 ML | Refills: 3 | Status: SHIPPED | OUTPATIENT
Start: 2023-09-15

## 2023-09-15 NOTE — TELEPHONE ENCOUNTER
Can the catheter supplies be sent today to [address]? [facility] does not have supplies.

## 2023-09-20 NOTE — ED TRIAGE NOTES
Pt arrive by [facility] with c/o abd pain and bloating and vomiting blood. Pt states she several episodes of emesis with bright red and dark blood. Pt abd distended and tender. Pt states she has had on and off chest pain with some sob. She reports sob is normal for her. Pt reports normal bm and passing gas. assessment complete. NS running from squad. Lab at  Bedside. Will continue to monitor.
4
[facility] through out

## 2023-09-21 ENCOUNTER — APPOINTMENT (OUTPATIENT)
Dept: CT IMAGING | Age: 68
End: 2023-09-21
Payer: MEDICARE

## 2023-09-21 ENCOUNTER — HOSPITAL ENCOUNTER (EMERGENCY)
Age: 68
Discharge: HOME | End: 2023-09-21
Payer: MEDICARE

## 2023-09-21 ENCOUNTER — TELEPHONE (OUTPATIENT)
Dept: UROLOGY | Age: 68
End: 2023-09-21

## 2023-09-21 VITALS
DIASTOLIC BLOOD PRESSURE: 80 MMHG | RESPIRATION RATE: 18 BRPM | OXYGEN SATURATION: 96 % | HEART RATE: 71 BPM | TEMPERATURE: 97.5 F | BODY MASS INDEX: 29.53 KG/M2 | SYSTOLIC BLOOD PRESSURE: 148 MMHG | HEIGHT: 64 IN | WEIGHT: 173 LBS

## 2023-09-21 DIAGNOSIS — N30.01 ACUTE CYSTITIS WITH HEMATURIA: Primary | ICD-10-CM

## 2023-09-21 DIAGNOSIS — R73.9 HYPERGLYCEMIA: ICD-10-CM

## 2023-09-21 DIAGNOSIS — S37.012A HEMATOMA OF LEFT KIDNEY, INITIAL ENCOUNTER: ICD-10-CM

## 2023-09-21 DIAGNOSIS — E86.0 DEHYDRATION: ICD-10-CM

## 2023-09-21 LAB
ALBUMIN SERPL BCG-MCNC: 3.8 G/DL (ref 3.5–5.1)
ALP SERPL-CCNC: 95 U/L (ref 38–126)
ALT SERPL W/O P-5'-P-CCNC: 14 U/L (ref 11–66)
ANION GAP SERPL CALC-SCNC: 10 MEQ/L (ref 8–16)
AST SERPL-CCNC: 11 U/L (ref 5–40)
BACTERIA URNS QL MICRO: ABNORMAL /HPF
BASOPHILS ABSOLUTE: 0 THOU/MM3 (ref 0–0.1)
BASOPHILS NFR BLD AUTO: 0.5 %
BILIRUB CONJ SERPL-MCNC: < 0.2 MG/DL (ref 0–0.3)
BILIRUB SERPL-MCNC: 0.2 MG/DL (ref 0.3–1.2)
BILIRUB UR QL STRIP.AUTO: NEGATIVE
BUN SERPL-MCNC: 14 MG/DL (ref 7–22)
CALCIUM SERPL-MCNC: 10.3 MG/DL (ref 8.5–10.5)
CASTS #/AREA URNS LPF: ABNORMAL /LPF
CASTS 2: ABNORMAL /LPF
CHARACTER UR: CLEAR
CHLORIDE SERPL-SCNC: 96 MEQ/L (ref 98–111)
CO2 SERPL-SCNC: 24 MEQ/L (ref 23–33)
COLOR: YELLOW
CREAT SERPL-MCNC: 0.6 MG/DL (ref 0.4–1.2)
CRYSTALS URNS MICRO: ABNORMAL
DEPRECATED RDW RBC AUTO: 41.5 FL (ref 35–45)
EOSINOPHIL NFR BLD AUTO: 1.2 %
EOSINOPHILS ABSOLUTE: 0.1 THOU/MM3 (ref 0–0.4)
EPITHELIAL CELLS, UA: ABNORMAL /HPF
ERYTHROCYTE [DISTWIDTH] IN BLOOD BY AUTOMATED COUNT: 13.7 % (ref 11.5–14.5)
GFR SERPL CREATININE-BSD FRML MDRD: > 60 ML/MIN/1.73M2
GLUCOSE SERPL-MCNC: 352 MG/DL (ref 70–108)
GLUCOSE UR QL STRIP.AUTO: >= 1000 MG/DL
HCT VFR BLD AUTO: 37.5 % (ref 37–47)
HGB BLD-MCNC: 12.2 GM/DL (ref 12–16)
HGB UR QL STRIP.AUTO: ABNORMAL
IMM GRANULOCYTES # BLD AUTO: 0.04 THOU/MM3 (ref 0–0.07)
IMM GRANULOCYTES NFR BLD AUTO: 0.5 %
KETONES UR QL STRIP.AUTO: NEGATIVE
LIPASE SERPL-CCNC: 57 U/L (ref 5.6–51.3)
LYMPHOCYTES ABSOLUTE: 2.2 THOU/MM3 (ref 1–4.8)
LYMPHOCYTES NFR BLD AUTO: 25.3 %
MCH RBC QN AUTO: 27 PG (ref 26–33)
MCHC RBC AUTO-ENTMCNC: 32.5 GM/DL (ref 32.2–35.5)
MCV RBC AUTO: 83 FL (ref 81–99)
MISCELLANEOUS 2: ABNORMAL
MONOCYTES ABSOLUTE: 0.5 THOU/MM3 (ref 0.4–1.3)
MONOCYTES NFR BLD AUTO: 5.5 %
NEUTROPHILS NFR BLD AUTO: 67 %
NITRITE UR QL STRIP: NEGATIVE
NRBC BLD AUTO-RTO: 0 /100 WBC
OSMOLALITY SERPL CALC.SUM OF ELEC: 275.4 MOSMOL/KG (ref 275–300)
PH UR STRIP.AUTO: 6.5 [PH] (ref 5–9)
PLATELET # BLD AUTO: 322 THOU/MM3 (ref 130–400)
PMV BLD AUTO: 9.1 FL (ref 9.4–12.4)
POTASSIUM SERPL-SCNC: 4.1 MEQ/L (ref 3.5–5.2)
PROT SERPL-MCNC: 7 G/DL (ref 6.1–8)
PROT UR STRIP.AUTO-MCNC: NEGATIVE MG/DL
RBC # BLD AUTO: 4.52 MILL/MM3 (ref 4.2–5.4)
RBC URINE: ABNORMAL /HPF
RENAL EPI CELLS #/AREA URNS HPF: ABNORMAL /[HPF]
SEGMENTED NEUTROPHILS ABSOLUTE COUNT: 5.8 THOU/MM3 (ref 1.8–7.7)
SODIUM SERPL-SCNC: 130 MEQ/L (ref 135–145)
SP GR UR REFRACT.AUTO: 1.02 (ref 1–1.03)
UROBILINOGEN, URINE: 1 EU/DL (ref 0–1)
WBC # BLD AUTO: 8.7 THOU/MM3 (ref 4.8–10.8)
WBC #/AREA URNS HPF: ABNORMAL /HPF
WBC #/AREA URNS HPF: ABNORMAL /[HPF]
YEAST LIKE FUNGI URNS QL MICRO: ABNORMAL

## 2023-09-21 PROCEDURE — 2580000003 HC RX 258: Performed by: PHYSICIAN ASSISTANT

## 2023-09-21 PROCEDURE — 80053 COMPREHEN METABOLIC PANEL: CPT

## 2023-09-21 PROCEDURE — 96360 HYDRATION IV INFUSION INIT: CPT

## 2023-09-21 PROCEDURE — 85025 COMPLETE CBC W/AUTO DIFF WBC: CPT

## 2023-09-21 PROCEDURE — 74176 CT ABD & PELVIS W/O CONTRAST: CPT

## 2023-09-21 PROCEDURE — 99284 EMERGENCY DEPT VISIT MOD MDM: CPT

## 2023-09-21 PROCEDURE — 36415 COLL VENOUS BLD VENIPUNCTURE: CPT

## 2023-09-21 PROCEDURE — 87086 URINE CULTURE/COLONY COUNT: CPT

## 2023-09-21 PROCEDURE — 6370000000 HC RX 637 (ALT 250 FOR IP): Performed by: PHYSICIAN ASSISTANT

## 2023-09-21 PROCEDURE — 81001 URINALYSIS AUTO W/SCOPE: CPT

## 2023-09-21 PROCEDURE — 83690 ASSAY OF LIPASE: CPT

## 2023-09-21 PROCEDURE — 82248 BILIRUBIN DIRECT: CPT

## 2023-09-21 RX ORDER — PHENAZOPYRIDINE HYDROCHLORIDE 100 MG/1
200 TABLET, FILM COATED ORAL
Status: DISCONTINUED | OUTPATIENT
Start: 2023-09-21 | End: 2023-09-21 | Stop reason: HOSPADM

## 2023-09-21 RX ORDER — PHENAZOPYRIDINE HYDROCHLORIDE 200 MG/1
200 TABLET, FILM COATED ORAL 3 TIMES DAILY PRN
Qty: 6 TABLET | Refills: 0 | Status: SHIPPED | OUTPATIENT
Start: 2023-09-21 | End: 2023-09-24

## 2023-09-21 RX ORDER — SULFAMETHOXAZOLE AND TRIMETHOPRIM 800; 160 MG/1; MG/1
1 TABLET ORAL 2 TIMES DAILY
Qty: 20 TABLET | Refills: 0 | Status: SHIPPED | OUTPATIENT
Start: 2023-09-21 | End: 2023-10-01

## 2023-09-21 RX ORDER — SULFAMETHOXAZOLE AND TRIMETHOPRIM 800; 160 MG/1; MG/1
1 TABLET ORAL ONCE
Status: COMPLETED | OUTPATIENT
Start: 2023-09-21 | End: 2023-09-21

## 2023-09-21 RX ORDER — 0.9 % SODIUM CHLORIDE 0.9 %
1000 INTRAVENOUS SOLUTION INTRAVENOUS ONCE
Status: COMPLETED | OUTPATIENT
Start: 2023-09-21 | End: 2023-09-21

## 2023-09-21 RX ADMIN — PHENAZOPYRIDINE HYDROCHLORIDE 200 MG: 100 TABLET ORAL at 17:36

## 2023-09-21 RX ADMIN — SODIUM CHLORIDE 1000 ML: 9 INJECTION, SOLUTION INTRAVENOUS at 17:33

## 2023-09-21 RX ADMIN — SULFAMETHOXAZOLE AND TRIMETHOPRIM 1 TABLET: 800; 160 TABLET ORAL at 17:36

## 2023-09-21 ASSESSMENT — PAIN DESCRIPTION - LOCATION: LOCATION: ABDOMEN

## 2023-09-21 ASSESSMENT — PAIN - FUNCTIONAL ASSESSMENT
PAIN_FUNCTIONAL_ASSESSMENT: 0-10
PAIN_FUNCTIONAL_ASSESSMENT: 0-10

## 2023-09-21 ASSESSMENT — PAIN DESCRIPTION - DESCRIPTORS: DESCRIPTORS: PRESSURE

## 2023-09-21 ASSESSMENT — PAIN SCALES - GENERAL
PAINLEVEL_OUTOF10: 8
PAINLEVEL_OUTOF10: 8

## 2023-09-21 NOTE — TELEPHONE ENCOUNTER
Rachel Jesus at [facility] [phone] stated patient c/o pain in her left side. Patient states she is urinating a little bit but not very well and can not empty the bladder. They do not know her output. She does keep the hat in the toilet. They can not get supplies for catheters since it is assisted living. Advised due to being late in the day and not having transportation, if the patient can not urinate to send to the ER.

## 2023-09-21 NOTE — ED TRIAGE NOTES
Patient presents via EMS from nursing home with complaints of urinary retention that started today. Patient reports having recent catheter in which was removed. Patient reports having Botox instilled in bladder and usually helps with urinary issue, but reports this time has not helped. 454 ml result obtained by bladder scanner.

## 2023-09-22 LAB
BACTERIA UR CULT: ABNORMAL
ORGANISM: ABNORMAL

## 2023-10-01 ENCOUNTER — APPOINTMENT (OUTPATIENT)
Dept: GENERAL RADIOLOGY | Age: 68
End: 2023-10-01
Payer: MEDICARE

## 2023-10-01 ENCOUNTER — HOSPITAL ENCOUNTER (EMERGENCY)
Age: 68
Discharge: HOME | End: 2023-10-01
Attending: EMERGENCY MEDICINE
Payer: MEDICARE

## 2023-10-01 VITALS
HEIGHT: 64 IN | DIASTOLIC BLOOD PRESSURE: 67 MMHG | TEMPERATURE: 97.5 F | SYSTOLIC BLOOD PRESSURE: 128 MMHG | BODY MASS INDEX: 29.53 KG/M2 | WEIGHT: 173 LBS | RESPIRATION RATE: 18 BRPM | HEART RATE: 72 BPM | OXYGEN SATURATION: 96 %

## 2023-10-01 DIAGNOSIS — S93.401A SPRAIN OF RIGHT ANKLE, UNSPECIFIED LIGAMENT, INITIAL ENCOUNTER: Primary | ICD-10-CM

## 2023-10-01 DIAGNOSIS — S93.601A SPRAIN OF RIGHT FOOT, INITIAL ENCOUNTER: ICD-10-CM

## 2023-10-01 PROCEDURE — 6370000000 HC RX 637 (ALT 250 FOR IP): Performed by: EMERGENCY MEDICINE

## 2023-10-01 PROCEDURE — 99283 EMERGENCY DEPT VISIT LOW MDM: CPT

## 2023-10-01 PROCEDURE — 73630 X-RAY EXAM OF FOOT: CPT

## 2023-10-01 PROCEDURE — 73610 X-RAY EXAM OF ANKLE: CPT

## 2023-10-01 RX ORDER — IBUPROFEN 200 MG
400 TABLET ORAL ONCE
Status: COMPLETED | OUTPATIENT
Start: 2023-10-01 | End: 2023-10-01

## 2023-10-01 RX ADMIN — IBUPROFEN 400 MG: 200 TABLET, FILM COATED ORAL at 17:20

## 2023-10-01 ASSESSMENT — PAIN DESCRIPTION - PAIN TYPE
TYPE: ACUTE PAIN
TYPE: ACUTE PAIN

## 2023-10-01 ASSESSMENT — PAIN - FUNCTIONAL ASSESSMENT
PAIN_FUNCTIONAL_ASSESSMENT: 0-10
PAIN_FUNCTIONAL_ASSESSMENT: 0-10

## 2023-10-01 ASSESSMENT — PAIN DESCRIPTION - ONSET: ONSET: SUDDEN

## 2023-10-01 ASSESSMENT — PAIN DESCRIPTION - DESCRIPTORS
DESCRIPTORS: TENDER;THROBBING
DESCRIPTORS: TENDER;THROBBING

## 2023-10-01 ASSESSMENT — PAIN DESCRIPTION - LOCATION
LOCATION: ANKLE
LOCATION: ANKLE

## 2023-10-01 ASSESSMENT — PAIN SCALES - GENERAL
PAINLEVEL_OUTOF10: 9
PAINLEVEL_OUTOF10: 8

## 2023-10-01 ASSESSMENT — PAIN DESCRIPTION - FREQUENCY
FREQUENCY: CONTINUOUS
FREQUENCY: CONTINUOUS

## 2023-10-01 ASSESSMENT — PAIN DESCRIPTION - ORIENTATION
ORIENTATION: RIGHT
ORIENTATION: RIGHT
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
## 2023-10-01 NOTE — DISCHARGE INSTRUCTIONS
Use Tylenol or Motrin for aches or pains. Keep foot and ankle elevated. Try heating pad or moist heat to the area. Be careful not to burn yourself. Follow-up with primary care.   Wear splint for comfort

## 2023-10-01 NOTE — ED PROVIDER NOTES
mis-transcribed.)    Yumiko Stark MD (electronically signed)  Attending Emergency Physician                      Yumiko Stark MD  10/01/23 1555

## 2023-10-03 ENCOUNTER — OFFICE VISIT (OUTPATIENT)
Dept: UROLOGY | Age: 68
End: 2023-10-03
Payer: MEDICARE

## 2023-10-03 VITALS — RESPIRATION RATE: 18 BRPM | BODY MASS INDEX: 29.53 KG/M2 | HEIGHT: 64 IN | WEIGHT: 173 LBS

## 2023-10-03 DIAGNOSIS — N32.81 OAB (OVERACTIVE BLADDER): Primary | ICD-10-CM

## 2023-10-03 DIAGNOSIS — N39.3 STRESS INCONTINENCE IN FEMALE: ICD-10-CM

## 2023-10-03 DIAGNOSIS — S37.019A PERINEPHRIC HEMATOMA: ICD-10-CM

## 2023-10-03 DIAGNOSIS — R35.0 URINARY FREQUENCY: ICD-10-CM

## 2023-10-03 LAB
BILIRUBIN URINE: NEGATIVE
BLOOD URINE, POC: ABNORMAL
CHARACTER, URINE: ABNORMAL
COLOR, URINE: YELLOW
GLUCOSE URINE: >= 1000 MG/DL
KETONES, URINE: NEGATIVE
LEUKOCYTE CLUMPS, URINE: ABNORMAL
NITRITE, URINE: NEGATIVE
PH, URINE: 6 (ref 5–9)
POST VOID RESIDUAL (PVR): 274 ML
PROTEIN, URINE: NEGATIVE MG/DL
SPECIFIC GRAVITY, URINE: 1.02 (ref 1–1.03)
UROBILINOGEN, URINE: 1 EU/DL (ref 0–1)

## 2023-10-03 PROCEDURE — G8484 FLU IMMUNIZE NO ADMIN: HCPCS | Performed by: NURSE PRACTITIONER

## 2023-10-03 PROCEDURE — 1090F PRES/ABSN URINE INCON ASSESS: CPT | Performed by: NURSE PRACTITIONER

## 2023-10-03 PROCEDURE — 4004F PT TOBACCO SCREEN RCVD TLK: CPT | Performed by: NURSE PRACTITIONER

## 2023-10-03 PROCEDURE — 99214 OFFICE O/P EST MOD 30 MIN: CPT | Performed by: NURSE PRACTITIONER

## 2023-10-03 PROCEDURE — G8417 CALC BMI ABV UP PARAM F/U: HCPCS | Performed by: NURSE PRACTITIONER

## 2023-10-03 PROCEDURE — G8427 DOCREV CUR MEDS BY ELIG CLIN: HCPCS | Performed by: NURSE PRACTITIONER

## 2023-10-03 PROCEDURE — 3017F COLORECTAL CA SCREEN DOC REV: CPT | Performed by: NURSE PRACTITIONER

## 2023-10-03 PROCEDURE — 51798 US URINE CAPACITY MEASURE: CPT | Performed by: NURSE PRACTITIONER

## 2023-10-03 PROCEDURE — G8400 PT W/DXA NO RESULTS DOC: HCPCS | Performed by: NURSE PRACTITIONER

## 2023-10-03 PROCEDURE — 1123F ACP DISCUSS/DSCN MKR DOCD: CPT | Performed by: NURSE PRACTITIONER

## 2023-10-03 PROCEDURE — 0509F URINE INCON PLAN DOCD: CPT | Performed by: NURSE PRACTITIONER

## 2023-10-03 PROCEDURE — 81003 URINALYSIS AUTO W/O SCOPE: CPT | Performed by: NURSE PRACTITIONER

## 2023-10-03 NOTE — PROGRESS NOTES
[address] [facility]  [address] [place] [address],[address] [address]  Dept: [phone]  Loc: [phone]    Visit Date: 10/3/2023        HPI:     Mike Membreno is a 79 y.o. female who presents today for:  Chief Complaint   Patient presents with    Follow-up    Post-Op Check       HPI  Patient presents to urology clinic for post-operative follow-up. Demar Pickard s/p cystoscopy with bladder botox 200 units by Dr. Burt Burch on 8/22/2023. PVR 274ml in office today, improvement from previous bladder scans. Denies flank pain, suprapubic pressure, gross hematuria, dysuria, fever or chills. Discussed avoiding bladder irritants. 9/13/2023  Demar Pickard was in ER yesterday with c/o of constipation x 4 days. Urine with mixed growth, currently on Keflex. Crt 0.7, WBC 8.9, Hgb 12.7. Catheter was placed in ER. Bladder scan 432ml. Loving draining with clear yellow urine. Long conversation regarding good bowel regimen.     Current Outpatient Medications   Medication Sig Dispense Refill    Catheters (BARDIA URETHRAL CATHETER 16FR) MISC 1 each by Does not apply route as needed (see) 1 16 fr catheter to be inserted may irrigate as needed with 100ml ns or sterile water daily and prn occlusion   may use any supply vendor 1 each 0    Water For Irrigation, Sterile (STERILE WATER FOR IRRIGATION) Irrigate with 100 ml ns or sterile water daily and prn for occlusion 500 mL 3    Incontinence Supplies (BARD BASIC IRRIGATION TRAY) KIT 1 each by Does not apply route as needed (irrigate daily and prn) 16 kit 3    Incontinence Supplies (URINARY DRAINAGE BAG) MISC 1 each by Does not apply route as needed (for urinary loving catheter) 2000ml drainage bag for urinary catheter  may use any supply vendor 1 each 0    Catheters (RALF LOVING INSERTION TRAY) MISC 1 each by Does not apply route as needed (1 insertion tray as needed) May use any vendor brand available to inset 16 fr loving catheter 1 each

## 2023-10-03 NOTE — PATIENT INSTRUCTIONS
Patient instructed to avoid bladder irritants in diet such as coffee, tea, caffeine, alcohol, carbonated beverages, spicy/acidic foods. Call sooner with any questions or concerns.

## 2023-10-04 LAB
BACTERIA UR CULT: ABNORMAL
ORGANISM: ABNORMAL

## 2023-10-04 RX ORDER — CEPHALEXIN 500 MG/1
500 CAPSULE ORAL 2 TIMES DAILY
Qty: 14 CAPSULE | Refills: 0 | Status: SHIPPED | OUTPATIENT
Start: 2023-10-04 | End: 2023-10-11

## 2023-10-11 ENCOUNTER — HOSPITAL ENCOUNTER (OUTPATIENT)
Dept: PHYSICAL THERAPY | Age: 68
Setting detail: THERAPIES SERIES
Discharge: HOME | End: 2023-10-11
Payer: MEDICARE

## 2023-10-11 PROCEDURE — 97530 THERAPEUTIC ACTIVITIES: CPT

## 2023-10-11 PROCEDURE — 97163 PT EVAL HIGH COMPLEX 45 MIN: CPT

## 2023-10-11 PROCEDURE — 97110 THERAPEUTIC EXERCISES: CPT

## 2023-10-23 ENCOUNTER — HOSPITAL ENCOUNTER (EMERGENCY)
Age: 68
Discharge: HOME | End: 2023-10-23
Payer: MEDICARE

## 2023-10-23 ENCOUNTER — APPOINTMENT (OUTPATIENT)
Dept: GENERAL RADIOLOGY | Age: 68
End: 2023-10-23
Payer: MEDICARE

## 2023-10-23 VITALS
BODY MASS INDEX: 29.02 KG/M2 | SYSTOLIC BLOOD PRESSURE: 137 MMHG | DIASTOLIC BLOOD PRESSURE: 78 MMHG | HEART RATE: 63 BPM | HEIGHT: 64 IN | RESPIRATION RATE: 16 BRPM | WEIGHT: 170 LBS | TEMPERATURE: 97.9 F | OXYGEN SATURATION: 96 %

## 2023-10-23 DIAGNOSIS — R68.89 MULTIPLE SOMATIC COMPLAINTS: ICD-10-CM

## 2023-10-23 DIAGNOSIS — S93.401A SPRAIN OF RIGHT ANKLE, UNSPECIFIED LIGAMENT, INITIAL ENCOUNTER: Primary | ICD-10-CM

## 2023-10-23 DIAGNOSIS — M25.572 ACUTE LEFT ANKLE PAIN: ICD-10-CM

## 2023-10-23 LAB
ALBUMIN SERPL BCG-MCNC: 3.1 G/DL (ref 3.5–5.1)
ALP SERPL-CCNC: 78 U/L (ref 38–126)
ALT SERPL W/O P-5'-P-CCNC: 18 U/L (ref 11–66)
ANION GAP SERPL CALC-SCNC: 11 MEQ/L (ref 8–16)
AST SERPL-CCNC: 26 U/L (ref 5–40)
BASOPHILS ABSOLUTE: 0 THOU/MM3 (ref 0–0.1)
BASOPHILS NFR BLD AUTO: 0.3 %
BILIRUB CONJ SERPL-MCNC: 0.3 MG/DL (ref 0–0.3)
BILIRUB SERPL-MCNC: 0.4 MG/DL (ref 0.3–1.2)
BUN SERPL-MCNC: 10 MG/DL (ref 7–22)
CALCIUM SERPL-MCNC: 10.2 MG/DL (ref 8.5–10.5)
CHLORIDE SERPL-SCNC: 97 MEQ/L (ref 98–111)
CO2 SERPL-SCNC: 23 MEQ/L (ref 23–33)
CREAT SERPL-MCNC: 0.7 MG/DL (ref 0.4–1.2)
CRP SERPL-MCNC: 19.77 MG/DL (ref 0–1)
DEPRECATED RDW RBC AUTO: 45.2 FL (ref 35–45)
EOSINOPHIL NFR BLD AUTO: 0.8 %
EOSINOPHILS ABSOLUTE: 0.1 THOU/MM3 (ref 0–0.4)
ERYTHROCYTE [DISTWIDTH] IN BLOOD BY AUTOMATED COUNT: 14.6 % (ref 11.5–14.5)
ERYTHROCYTE [SEDIMENTATION RATE] IN BLOOD BY WESTERGREN METHOD: 85 MM/HR (ref 0–20)
GFR SERPL CREATININE-BSD FRML MDRD: > 60 ML/MIN/1.73M2
GLUCOSE SERPL-MCNC: 126 MG/DL (ref 70–108)
HCT VFR BLD AUTO: 32.7 % (ref 37–47)
HGB BLD-MCNC: 9.9 GM/DL (ref 12–16)
IMM GRANULOCYTES # BLD AUTO: 0.1 THOU/MM3 (ref 0–0.07)
IMM GRANULOCYTES NFR BLD AUTO: 0.8 %
LIPASE SERPL-CCNC: 32.3 U/L (ref 5.6–51.3)
LYMPHOCYTES ABSOLUTE: 2.1 THOU/MM3 (ref 1–4.8)
LYMPHOCYTES NFR BLD AUTO: 18 %
MCH RBC QN AUTO: 25.5 PG (ref 26–33)
MCHC RBC AUTO-ENTMCNC: 30.3 GM/DL (ref 32.2–35.5)
MCV RBC AUTO: 84.3 FL (ref 81–99)
MONOCYTES ABSOLUTE: 0.7 THOU/MM3 (ref 0.4–1.3)
MONOCYTES NFR BLD AUTO: 6 %
NEUTROPHILS NFR BLD AUTO: 74.1 %
NRBC BLD AUTO-RTO: 0 /100 WBC
OSMOLALITY SERPL CALC.SUM OF ELEC: 263.2 MOSMOL/KG (ref 275–300)
PLATELET # BLD AUTO: 483 THOU/MM3 (ref 130–400)
PMV BLD AUTO: 8.7 FL (ref 9.4–12.4)
POTASSIUM SERPL-SCNC: 4.9 MEQ/L (ref 3.5–5.2)
PROT SERPL-MCNC: 7.2 G/DL (ref 6.1–8)
RBC # BLD AUTO: 3.88 MILL/MM3 (ref 4.2–5.4)
SEGMENTED NEUTROPHILS ABSOLUTE COUNT: 8.8 THOU/MM3 (ref 1.8–7.7)
SODIUM SERPL-SCNC: 131 MEQ/L (ref 135–145)
WBC # BLD AUTO: 11.9 THOU/MM3 (ref 4.8–10.8)

## 2023-10-23 PROCEDURE — 6370000000 HC RX 637 (ALT 250 FOR IP): Performed by: PHYSICIAN ASSISTANT

## 2023-10-23 PROCEDURE — 99284 EMERGENCY DEPT VISIT MOD MDM: CPT

## 2023-10-23 PROCEDURE — 85651 RBC SED RATE NONAUTOMATED: CPT

## 2023-10-23 PROCEDURE — 83690 ASSAY OF LIPASE: CPT

## 2023-10-23 PROCEDURE — 73610 X-RAY EXAM OF ANKLE: CPT

## 2023-10-23 PROCEDURE — 86140 C-REACTIVE PROTEIN: CPT

## 2023-10-23 PROCEDURE — 85025 COMPLETE CBC W/AUTO DIFF WBC: CPT

## 2023-10-23 PROCEDURE — 36415 COLL VENOUS BLD VENIPUNCTURE: CPT

## 2023-10-23 PROCEDURE — 80053 COMPREHEN METABOLIC PANEL: CPT

## 2023-10-23 PROCEDURE — 82248 BILIRUBIN DIRECT: CPT

## 2023-10-23 RX ORDER — TRAMADOL HYDROCHLORIDE 50 MG/1
50 TABLET ORAL ONCE
Status: COMPLETED | OUTPATIENT
Start: 2023-10-23 | End: 2023-10-23

## 2023-10-23 RX ADMIN — TRAMADOL HYDROCHLORIDE 50 MG: 50 TABLET, COATED ORAL at 11:48

## 2023-10-23 ASSESSMENT — PAIN DESCRIPTION - PAIN TYPE: TYPE: ACUTE PAIN

## 2023-10-23 ASSESSMENT — PAIN SCALES - GENERAL
PAINLEVEL_OUTOF10: 9
PAINLEVEL_OUTOF10: 9

## 2023-10-23 ASSESSMENT — PAIN DESCRIPTION - LOCATION: LOCATION: ANKLE

## 2023-10-23 ASSESSMENT — ENCOUNTER SYMPTOMS
ABDOMINAL PAIN: 0
SORE THROAT: 0
SHORTNESS OF BREATH: 0
VOMITING: 0
NAUSEA: 1
RHINORRHEA: 0
BACK PAIN: 0

## 2023-10-23 ASSESSMENT — PAIN - FUNCTIONAL ASSESSMENT: PAIN_FUNCTIONAL_ASSESSMENT: 0-10

## 2023-10-23 NOTE — ED TRIAGE NOTES
Patient presents via [facility] from [facility] in [place] with reports of right ankle pain that has been ongoing for the past 2 to 3 weeks. Patient reports increased pain upon standing.

## 2023-10-23 NOTE — ED PROVIDER NOTES
for urinary catheter  may use any supply vendor    LEVOTHYROXINE (SYNTHROID) 75 MCG TABLET    Take 1 tablet by mouth daily everyday except  take 150 mcg. MAGNESIUM HYDROXIDE (MILK OF MAGNESIA) 400 MG/5ML SUSPENSION    Take 30 mLs by mouth daily as needed for Constipation    PANTOPRAZOLE (PROTONIX) 40 MG TABLET    Take 1 tablet by mouth 2 times daily (before meals)    POLYETHYLENE GLYCOL (GLYCOLAX) 17 G PACKET    Take 1 packet by mouth daily as needed for Constipation    POTASSIUM CHLORIDE (KLOR-CON M) 20 MEQ EXTENDED RELEASE TABLET    Take 1 tablet by mouth daily    QUETIAPINE (SEROQUEL) 300 MG TABLET    Take 2 tablets by mouth nightly 2 tab    SUCRALFATE (CARAFATE) 1 GM TABLET    Take 1 tablet by mouth 4 times daily    TRAZODONE (DESYREL) 100 MG TABLET    Take 2 tablets by mouth nightly    WATER FOR IRRIGATION, STERILE (STERILE WATER FOR IRRIGATION)    Irrigate with 100 ml ns or sterile water daily and prn for occlusion       ALLERGIES     has No Known Allergies. FAMILY HISTORY     She indicated that her mother is . She indicated that her father is . She indicated that her sister is alive. She indicated that her brother is alive. She indicated that the status of her maternal grandmother is unknown. She indicated that the status of her maternal grandfather is unknown. She indicated that the status of her paternal grandmother is unknown. She indicated that the status of her paternal grandfather is unknown. She indicated that the status of her maternal uncle is unknown. She indicated that the status of her neg hx is unknown.   family history includes Cancer in her father, mother, and sister; Depression in her father; Diabetes in her maternal grandmother; Early Death in her maternal grandfather; Heart Attack in her sister;  Heart Disease in her father, maternal grandfather, maternal grandmother, maternal uncle, mother, paternal grandfather, and paternal grandmother; High Blood Pressure in her

## 2023-10-29 ENCOUNTER — HOSPITAL ENCOUNTER (INPATIENT)
Age: 68
LOS: 15 days | Discharge: HOME | DRG: 690 | End: 2023-11-13
Attending: STUDENT IN AN ORGANIZED HEALTH CARE EDUCATION/TRAINING PROGRAM | Admitting: INTERNAL MEDICINE
Payer: MEDICARE

## 2023-10-29 ENCOUNTER — APPOINTMENT (OUTPATIENT)
Dept: CT IMAGING | Age: 68
DRG: 690 | End: 2023-10-29
Payer: MEDICARE

## 2023-10-29 DIAGNOSIS — N15.1 PERINEPHRIC ABSCESS: ICD-10-CM

## 2023-10-29 DIAGNOSIS — N15.1 RENAL ABSCESS, LEFT: Primary | ICD-10-CM

## 2023-10-29 DIAGNOSIS — N90.89 LESION OF LABIA: ICD-10-CM

## 2023-10-29 LAB
ALBUMIN SERPL BCG-MCNC: 2.6 G/DL (ref 3.5–5.1)
ALP SERPL-CCNC: 83 U/L (ref 38–126)
ALT SERPL W/O P-5'-P-CCNC: 11 U/L (ref 11–66)
ANION GAP SERPL CALC-SCNC: 10 MEQ/L (ref 8–16)
ANION GAP SERPL CALC-SCNC: 13 MEQ/L (ref 8–16)
AST SERPL-CCNC: 16 U/L (ref 5–40)
BACTERIA URNS QL MICRO: ABNORMAL /HPF
BASOPHILS ABSOLUTE: 0.1 THOU/MM3 (ref 0–0.1)
BASOPHILS NFR BLD AUTO: 0.4 %
BILIRUB SERPL-MCNC: 0.5 MG/DL (ref 0.3–1.2)
BILIRUB UR QL STRIP.AUTO: NEGATIVE
BUN SERPL-MCNC: 11 MG/DL (ref 7–22)
BUN SERPL-MCNC: 9 MG/DL (ref 7–22)
CALCIUM SERPL-MCNC: 10.1 MG/DL (ref 8.5–10.5)
CALCIUM SERPL-MCNC: 10.2 MG/DL (ref 8.5–10.5)
CASTS #/AREA URNS LPF: ABNORMAL /LPF
CASTS 2: ABNORMAL /LPF
CHARACTER UR: ABNORMAL
CHLORIDE SERPL-SCNC: 93 MEQ/L (ref 98–111)
CHLORIDE SERPL-SCNC: 95 MEQ/L (ref 98–111)
CO2 SERPL-SCNC: 21 MEQ/L (ref 23–33)
CO2 SERPL-SCNC: 21 MEQ/L (ref 23–33)
COLOR: YELLOW
CREAT SERPL-MCNC: 0.6 MG/DL (ref 0.4–1.2)
CREAT SERPL-MCNC: 0.7 MG/DL (ref 0.4–1.2)
CRYSTALS URNS MICRO: ABNORMAL
DEPRECATED RDW RBC AUTO: 46 FL (ref 35–45)
EOSINOPHIL NFR BLD AUTO: 0.6 %
EOSINOPHILS ABSOLUTE: 0.1 THOU/MM3 (ref 0–0.4)
EPITHELIAL CELLS, UA: ABNORMAL /HPF
ERYTHROCYTE [DISTWIDTH] IN BLOOD BY AUTOMATED COUNT: 15.1 % (ref 11.5–14.5)
GFR SERPL CREATININE-BSD FRML MDRD: > 60 ML/MIN/1.73M2
GFR SERPL CREATININE-BSD FRML MDRD: > 60 ML/MIN/1.73M2
GLUCOSE SERPL-MCNC: 127 MG/DL (ref 70–108)
GLUCOSE SERPL-MCNC: 157 MG/DL (ref 70–108)
GLUCOSE UR QL STRIP.AUTO: NEGATIVE MG/DL
HCT VFR BLD AUTO: 31.9 % (ref 37–47)
HGB BLD-MCNC: 9.5 GM/DL (ref 12–16)
HGB UR QL STRIP.AUTO: ABNORMAL
IMM GRANULOCYTES # BLD AUTO: 0.15 THOU/MM3 (ref 0–0.07)
IMM GRANULOCYTES NFR BLD AUTO: 1.1 %
INR PPP: 1.56 (ref 0.85–1.13)
KETONES UR QL STRIP.AUTO: NEGATIVE
LACTIC ACID, SEPSIS: 0.6 MMOL/L (ref 0.5–1.9)
LACTIC ACID, SEPSIS: 0.8 MMOL/L (ref 0.5–1.9)
LYMPHOCYTES ABSOLUTE: 1.9 THOU/MM3 (ref 1–4.8)
LYMPHOCYTES NFR BLD AUTO: 13.3 %
MCH RBC QN AUTO: 24.5 PG (ref 26–33)
MCHC RBC AUTO-ENTMCNC: 29.8 GM/DL (ref 32.2–35.5)
MCV RBC AUTO: 82.4 FL (ref 81–99)
MISCELLANEOUS 2: ABNORMAL
MONOCYTES ABSOLUTE: 1.1 THOU/MM3 (ref 0.4–1.3)
MONOCYTES NFR BLD AUTO: 8.1 %
NEUTROPHILS NFR BLD AUTO: 76.5 %
NITRITE UR QL STRIP: NEGATIVE
NRBC BLD AUTO-RTO: 0 /100 WBC
OSMOLALITY SERPL CALC.SUM OF ELEC: 255.3 MOSMOL/KG (ref 275–300)
PH UR STRIP.AUTO: 5.5 [PH] (ref 5–9)
PLATELET # BLD AUTO: 537 THOU/MM3 (ref 130–400)
PMV BLD AUTO: 8.1 FL (ref 9.4–12.4)
POTASSIUM SERPL-SCNC: 4.5 MEQ/L (ref 3.5–5.2)
POTASSIUM SERPL-SCNC: 5.3 MEQ/L (ref 3.5–5.2)
PROT SERPL-MCNC: 7.3 G/DL (ref 6.1–8)
PROT UR STRIP.AUTO-MCNC: NEGATIVE MG/DL
RBC # BLD AUTO: 3.87 MILL/MM3 (ref 4.2–5.4)
RBC URINE: ABNORMAL /HPF
RENAL EPI CELLS #/AREA URNS HPF: ABNORMAL /[HPF]
SEGMENTED NEUTROPHILS ABSOLUTE COUNT: 10.8 THOU/MM3 (ref 1.8–7.7)
SODIUM SERPL-SCNC: 126 MEQ/L (ref 135–145)
SODIUM SERPL-SCNC: 127 MEQ/L (ref 135–145)
SP GR UR REFRACT.AUTO: 1.02 (ref 1–1.03)
UROBILINOGEN, URINE: 4 EU/DL (ref 0–1)
WBC # BLD AUTO: 14.1 THOU/MM3 (ref 4.8–10.8)
WBC #/AREA URNS HPF: ABNORMAL /HPF
WBC #/AREA URNS HPF: ABNORMAL /[HPF]
YEAST LIKE FUNGI URNS QL MICRO: ABNORMAL

## 2023-10-29 PROCEDURE — 96375 TX/PRO/DX INJ NEW DRUG ADDON: CPT

## 2023-10-29 PROCEDURE — 6370000000 HC RX 637 (ALT 250 FOR IP): Performed by: INTERNAL MEDICINE

## 2023-10-29 PROCEDURE — 6360000004 HC RX CONTRAST MEDICATION: Performed by: STUDENT IN AN ORGANIZED HEALTH CARE EDUCATION/TRAINING PROGRAM

## 2023-10-29 PROCEDURE — 87086 URINE CULTURE/COLONY COUNT: CPT

## 2023-10-29 PROCEDURE — 2580000003 HC RX 258: Performed by: STUDENT IN AN ORGANIZED HEALTH CARE EDUCATION/TRAINING PROGRAM

## 2023-10-29 PROCEDURE — 87040 BLOOD CULTURE FOR BACTERIA: CPT

## 2023-10-29 PROCEDURE — 96374 THER/PROPH/DIAG INJ IV PUSH: CPT

## 2023-10-29 PROCEDURE — 99223 1ST HOSP IP/OBS HIGH 75: CPT | Performed by: INTERNAL MEDICINE

## 2023-10-29 PROCEDURE — 36415 COLL VENOUS BLD VENIPUNCTURE: CPT

## 2023-10-29 PROCEDURE — 6360000002 HC RX W HCPCS: Performed by: STUDENT IN AN ORGANIZED HEALTH CARE EDUCATION/TRAINING PROGRAM

## 2023-10-29 PROCEDURE — 2580000003 HC RX 258: Performed by: INTERNAL MEDICINE

## 2023-10-29 PROCEDURE — 85025 COMPLETE CBC W/AUTO DIFF WBC: CPT

## 2023-10-29 PROCEDURE — 6360000002 HC RX W HCPCS: Performed by: INTERNAL MEDICINE

## 2023-10-29 PROCEDURE — 6360000002 HC RX W HCPCS: Performed by: NURSE PRACTITIONER

## 2023-10-29 PROCEDURE — 87077 CULTURE AEROBIC IDENTIFY: CPT

## 2023-10-29 PROCEDURE — 99285 EMERGENCY DEPT VISIT HI MDM: CPT

## 2023-10-29 PROCEDURE — 1200000003 HC TELEMETRY R&B

## 2023-10-29 PROCEDURE — 81001 URINALYSIS AUTO W/SCOPE: CPT

## 2023-10-29 PROCEDURE — 74177 CT ABD & PELVIS W/CONTRAST: CPT

## 2023-10-29 PROCEDURE — 80053 COMPREHEN METABOLIC PANEL: CPT

## 2023-10-29 PROCEDURE — 87186 SC STD MICRODIL/AGAR DIL: CPT

## 2023-10-29 PROCEDURE — 85610 PROTHROMBIN TIME: CPT

## 2023-10-29 PROCEDURE — 83605 ASSAY OF LACTIC ACID: CPT

## 2023-10-29 RX ORDER — FENTANYL CITRATE 50 UG/ML
50 INJECTION, SOLUTION INTRAMUSCULAR; INTRAVENOUS ONCE
Status: COMPLETED | OUTPATIENT
Start: 2023-10-29 | End: 2023-10-29

## 2023-10-29 RX ORDER — FENOFIBRATE 160 MG/1
160 TABLET ORAL DAILY
Status: DISCONTINUED | OUTPATIENT
Start: 2023-10-30 | End: 2023-11-13 | Stop reason: HOSPADM

## 2023-10-29 RX ORDER — ATORVASTATIN CALCIUM 40 MG/1
40 TABLET, FILM COATED ORAL NIGHTLY
Status: DISCONTINUED | OUTPATIENT
Start: 2023-10-29 | End: 2023-11-13 | Stop reason: HOSPADM

## 2023-10-29 RX ORDER — ALBUTEROL SULFATE 2.5 MG/3ML
2.5 SOLUTION RESPIRATORY (INHALATION) EVERY 6 HOURS PRN
Status: DISCONTINUED | OUTPATIENT
Start: 2023-10-29 | End: 2023-11-13 | Stop reason: HOSPADM

## 2023-10-29 RX ORDER — ESCITALOPRAM OXALATE 20 MG/1
20 TABLET ORAL DAILY
Status: DISCONTINUED | OUTPATIENT
Start: 2023-10-30 | End: 2023-11-13 | Stop reason: HOSPADM

## 2023-10-29 RX ORDER — SODIUM CHLORIDE 9 MG/ML
INJECTION, SOLUTION INTRAVENOUS PRN
Status: DISCONTINUED | OUTPATIENT
Start: 2023-10-29 | End: 2023-11-13 | Stop reason: HOSPADM

## 2023-10-29 RX ORDER — ARIPIPRAZOLE 2 MG/1
2 TABLET ORAL DAILY
Status: DISCONTINUED | OUTPATIENT
Start: 2023-10-30 | End: 2023-11-13 | Stop reason: HOSPADM

## 2023-10-29 RX ORDER — QUETIAPINE 200 MG/1
600 TABLET, FILM COATED, EXTENDED RELEASE ORAL NIGHTLY
Status: DISCONTINUED | OUTPATIENT
Start: 2023-10-29 | End: 2023-11-13 | Stop reason: HOSPADM

## 2023-10-29 RX ORDER — ACETAMINOPHEN 325 MG/1
650 TABLET ORAL EVERY 6 HOURS PRN
Status: DISCONTINUED | OUTPATIENT
Start: 2023-10-29 | End: 2023-11-13 | Stop reason: HOSPADM

## 2023-10-29 RX ORDER — SODIUM CHLORIDE, SODIUM LACTATE, POTASSIUM CHLORIDE, AND CALCIUM CHLORIDE .6; .31; .03; .02 G/100ML; G/100ML; G/100ML; G/100ML
30 INJECTION, SOLUTION INTRAVENOUS ONCE
Status: DISCONTINUED | OUTPATIENT
Start: 2023-10-29 | End: 2023-11-13 | Stop reason: HOSPADM

## 2023-10-29 RX ORDER — 0.9 % SODIUM CHLORIDE 0.9 %
500 INTRAVENOUS SOLUTION INTRAVENOUS ONCE
Status: COMPLETED | OUTPATIENT
Start: 2023-10-29 | End: 2023-10-29

## 2023-10-29 RX ORDER — TRAZODONE HYDROCHLORIDE 100 MG/1
300 TABLET ORAL NIGHTLY PRN
Status: DISCONTINUED | OUTPATIENT
Start: 2023-10-29 | End: 2023-10-31

## 2023-10-29 RX ORDER — SUCRALFATE 1 G/1
1 TABLET ORAL 4 TIMES DAILY
Status: DISCONTINUED | OUTPATIENT
Start: 2023-10-29 | End: 2023-11-13 | Stop reason: HOSPADM

## 2023-10-29 RX ORDER — MORPHINE SULFATE 2 MG/ML
2 INJECTION, SOLUTION INTRAMUSCULAR; INTRAVENOUS ONCE
Status: COMPLETED | OUTPATIENT
Start: 2023-10-29 | End: 2023-10-29

## 2023-10-29 RX ORDER — LEVOTHYROXINE SODIUM 0.07 MG/1
75 TABLET ORAL
Status: DISCONTINUED | OUTPATIENT
Start: 2023-10-30 | End: 2023-11-13 | Stop reason: HOSPADM

## 2023-10-29 RX ORDER — PANTOPRAZOLE SODIUM 40 MG/1
40 TABLET, DELAYED RELEASE ORAL
Status: DISCONTINUED | OUTPATIENT
Start: 2023-10-30 | End: 2023-11-13 | Stop reason: HOSPADM

## 2023-10-29 RX ORDER — ACETAMINOPHEN 650 MG/1
650 SUPPOSITORY RECTAL EVERY 6 HOURS PRN
Status: DISCONTINUED | OUTPATIENT
Start: 2023-10-29 | End: 2023-11-13 | Stop reason: HOSPADM

## 2023-10-29 RX ORDER — SODIUM CHLORIDE 0.9 % (FLUSH) 0.9 %
5-40 SYRINGE (ML) INJECTION PRN
Status: DISCONTINUED | OUTPATIENT
Start: 2023-10-29 | End: 2023-11-13 | Stop reason: HOSPADM

## 2023-10-29 RX ORDER — BUPROPION HYDROCHLORIDE 150 MG/1
150 TABLET ORAL EVERY MORNING
Status: DISCONTINUED | OUTPATIENT
Start: 2023-10-30 | End: 2023-11-13 | Stop reason: HOSPADM

## 2023-10-29 RX ORDER — FOLIC ACID 1 MG/1
1 TABLET ORAL DAILY
Status: DISCONTINUED | OUTPATIENT
Start: 2023-10-29 | End: 2023-11-13 | Stop reason: HOSPADM

## 2023-10-29 RX ORDER — LEVOTHYROXINE SODIUM 0.15 MG/1
150 TABLET ORAL
Status: DISCONTINUED | OUTPATIENT
Start: 2023-11-05 | End: 2023-11-13 | Stop reason: HOSPADM

## 2023-10-29 RX ORDER — FERROUS SULFATE 325(65) MG
325 TABLET ORAL 2 TIMES DAILY
Status: DISCONTINUED | OUTPATIENT
Start: 2023-10-29 | End: 2023-11-13 | Stop reason: HOSPADM

## 2023-10-29 RX ORDER — SODIUM CHLORIDE 0.9 % (FLUSH) 0.9 %
5-40 SYRINGE (ML) INJECTION EVERY 12 HOURS SCHEDULED
Status: DISCONTINUED | OUTPATIENT
Start: 2023-10-29 | End: 2023-11-13 | Stop reason: HOSPADM

## 2023-10-29 RX ORDER — CARVEDILOL 3.12 MG/1
3.12 TABLET ORAL 2 TIMES DAILY WITH MEALS
Status: DISCONTINUED | OUTPATIENT
Start: 2023-10-30 | End: 2023-11-13 | Stop reason: HOSPADM

## 2023-10-29 RX ADMIN — PIPERACILLIN AND TAZOBACTAM 3375 MG: 3; .375 INJECTION, POWDER, LYOPHILIZED, FOR SOLUTION INTRAVENOUS at 22:18

## 2023-10-29 RX ADMIN — MORPHINE SULFATE 2 MG: 2 INJECTION, SOLUTION INTRAMUSCULAR; INTRAVENOUS at 21:45

## 2023-10-29 RX ADMIN — PIPERACILLIN AND TAZOBACTAM 4500 MG: 4; .5 INJECTION, POWDER, LYOPHILIZED, FOR SOLUTION INTRAVENOUS at 17:12

## 2023-10-29 RX ADMIN — SODIUM CHLORIDE, PRESERVATIVE FREE 10 ML: 5 INJECTION INTRAVENOUS at 22:22

## 2023-10-29 RX ADMIN — SODIUM CHLORIDE 500 ML: 9 INJECTION, SOLUTION INTRAVENOUS at 16:06

## 2023-10-29 RX ADMIN — IOPAMIDOL 80 ML: 755 INJECTION, SOLUTION INTRAVENOUS at 15:51

## 2023-10-29 RX ADMIN — FENTANYL CITRATE 50 MCG: 50 INJECTION, SOLUTION INTRAMUSCULAR; INTRAVENOUS at 16:05

## 2023-10-29 ASSESSMENT — PAIN DESCRIPTION - LOCATION
LOCATION: ABDOMEN
LOCATION: ABDOMEN
LOCATION: ABDOMEN;VAGINA
LOCATION: ABDOMEN
LOCATION: ABDOMEN

## 2023-10-29 ASSESSMENT — PAIN - FUNCTIONAL ASSESSMENT
PAIN_FUNCTIONAL_ASSESSMENT: 0-10
PAIN_FUNCTIONAL_ASSESSMENT: 0-10
PAIN_FUNCTIONAL_ASSESSMENT: ACTIVITIES ARE NOT PREVENTED
PAIN_FUNCTIONAL_ASSESSMENT: 0-10
PAIN_FUNCTIONAL_ASSESSMENT: 0-10

## 2023-10-29 ASSESSMENT — PAIN SCALES - GENERAL
PAINLEVEL_OUTOF10: 10
PAINLEVEL_OUTOF10: 8
PAINLEVEL_OUTOF10: 10

## 2023-10-29 ASSESSMENT — PAIN DESCRIPTION - DESCRIPTORS: DESCRIPTORS: DISCOMFORT;ACHING

## 2023-10-29 ASSESSMENT — PAIN DESCRIPTION - ORIENTATION: ORIENTATION: LEFT

## 2023-10-29 NOTE — ED TRIAGE NOTES
Pt presents to the ED by EMS from [facility] with c/c lower abdominal pain. Pt recently diagnosed with UTI, has been taking bactrim for UTI. Pt also reports diarrhea and vomiting that started today. IV established by EMS and 4 mg Zofran administered en route to ED. Vitals stable.  Rates pain 10/10

## 2023-10-29 NOTE — ED PROVIDER NOTES
bleed, kidney stone, aortic aneurysm, aortic dissection, hemorrhoids, appendicitis, cholecystitis, pancreatitis             Pertinent Comorbid Conditions:     bipolar, diabetes diabetes, bipolar, COPD    2)  Data Reviewed (none if blank or additional interpretation can be found in ED Course)          My Independent interpretations:     EKG:          Imaging: Significant perinephric abscesses    Labs:      Leukocytosis and anemia. No other emergent findings. Mild hyponatremia and hypochloremia that I suspect is secondary to dehydration. Decision Rules/Clinical Scores utilized: No SIRS criteria            External Documentation Reviewed:   Previous patient encounter documents & history available on EMR was reviewed as available. 3)  Treatment and Disposition         ED Reassessment: Significantly improved after fentanyl administration. Case discussed with consulting clinician: Hospitalist Dr. Sheldon Grant for admission, Jo Ann Duke NP for urology with no further recommendations at this time, plan to see tomorrow. Shared Decision-Making was performed and disposition discussed with the        patient/caregiver at bedside with all questions answered. Social determinants of health impacting treatment or disposition:           Code Status: Not addressed      Summary of Patient Presentation:      MDM  Number of Diagnoses or Management Options  Renal abscess, left  Diagnosis management comments: 80-year-old comorbid female coming in with worsening left lower quadrant pain. Significant urologic history and being treated as an outpatient for urine tract infection. Urine is dirty and patient has evidence of significant abscess formation in the left perinephric region. This was identified also on bedside ultrasound. Consult to urology with no further recommendations at this time. Cultures drawn and Zosyn administered. No sepsis.   Plan to admit to hospitalist for further work-up and

## 2023-10-29 NOTE — H&P
Hospitalist History and Physical          Patient: Dat De La Cruz  : 1955  MRN: [id number]     Acct: [de-identified]    PCP: Shmuel Salazar MD  Date of Admission: 10/29/2023  Date of Service: Pt seen/examined on 10/29/23  and Admitted to Inpatient with expected LOS greater than two midnights due to medical therapy. Hospital Problems             Last Modified POA    * (Principal) Perinephric abscess 10/29/2023 Yes       Assessment and Plan:  Perinephric abscess: Imaging studies concerning for abscess. Review of prior cultures demonstrates resistance to Bactrim for Proteus species. Patient recently on Bactrim but there is no cultures from this time around as far as I can see in the chart. Concerned that she may have a resistant organism developing.  -Urology will discuss with them about drainage  -May need to discuss with interventional radiology as well  -Hold aspirin and anticoagulation  -Empiric Zosyn  -Urine culture pending as well as blood cultures  -30 cc per cake LR bolus  -Check lactic acid  -Leukocytosis likely related to this. Possible lichen sclerosis of left labia: Was paged to me by ED Team. Will follow up in AM.     Bipolar 1 disorder: Continue home medication     COPD, no acute exacerbation: Continue home inhalers     Essential hypertension: Continue home medications     Hyperlipidemia: Continue home medication        =======================================================================      Chief Complaint:  abdominal pain    History Of Present Illness:  Dat De La Cruz is a 79 y.o. female with PMHx of bipolar 1, urinary retention, HTN, HLD who presents to [address] with abdominal pain. Patient was recently treated with Botox for urinary retention and also over the last couple of weeks/months has been treated for UTI for Proteus species.   She was being treated for UTI again at her nursing facility with reportedly Bactrim and developed worsening left

## 2023-10-30 ENCOUNTER — APPOINTMENT (OUTPATIENT)
Dept: CT IMAGING | Age: 68
DRG: 690 | End: 2023-10-30
Payer: MEDICARE

## 2023-10-30 PROBLEM — B37.83 ANGULAR CHEILITIS WITH CANDIDIASIS: Status: ACTIVE | Noted: 2023-10-30

## 2023-10-30 LAB
BASOPHILS ABSOLUTE: 0.1 THOU/MM3 (ref 0–0.1)
BASOPHILS NFR BLD AUTO: 0.6 %
DEPRECATED RDW RBC AUTO: 46.5 FL (ref 35–45)
EOSINOPHIL NFR BLD AUTO: 1 %
EOSINOPHILS ABSOLUTE: 0.1 THOU/MM3 (ref 0–0.4)
ERYTHROCYTE [DISTWIDTH] IN BLOOD BY AUTOMATED COUNT: 15.2 % (ref 11.5–14.5)
HCT VFR BLD AUTO: 30.3 % (ref 37–47)
HGB BLD-MCNC: 9.2 GM/DL (ref 12–16)
IMM GRANULOCYTES # BLD AUTO: 0.13 THOU/MM3 (ref 0–0.07)
IMM GRANULOCYTES NFR BLD AUTO: 1.2 %
LYMPHOCYTES ABSOLUTE: 1.9 THOU/MM3 (ref 1–4.8)
LYMPHOCYTES NFR BLD AUTO: 17.7 %
MCH RBC QN AUTO: 25.1 PG (ref 26–33)
MCHC RBC AUTO-ENTMCNC: 30.4 GM/DL (ref 32.2–35.5)
MCV RBC AUTO: 82.6 FL (ref 81–99)
MONOCYTES ABSOLUTE: 1 THOU/MM3 (ref 0.4–1.3)
MONOCYTES NFR BLD AUTO: 9.5 %
NEUTROPHILS NFR BLD AUTO: 70 %
NRBC BLD AUTO-RTO: 0 /100 WBC
OSMOLALITY UR: 596 MOSMOL/KG (ref 250–750)
PLATELET # BLD AUTO: 472 THOU/MM3 (ref 130–400)
PMV BLD AUTO: 8.1 FL (ref 9.4–12.4)
RBC # BLD AUTO: 3.67 MILL/MM3 (ref 4.2–5.4)
SEGMENTED NEUTROPHILS ABSOLUTE COUNT: 7.6 THOU/MM3 (ref 1.8–7.7)
SODIUM UR-SCNC: 44 MEQ/L
WBC # BLD AUTO: 10.8 THOU/MM3 (ref 4.8–10.8)

## 2023-10-30 PROCEDURE — 6360000002 HC RX W HCPCS: Performed by: RADIOLOGY

## 2023-10-30 PROCEDURE — 36415 COLL VENOUS BLD VENIPUNCTURE: CPT

## 2023-10-30 PROCEDURE — 6360000002 HC RX W HCPCS: Performed by: INTERNAL MEDICINE

## 2023-10-30 PROCEDURE — 2580000003 HC RX 258: Performed by: INTERNAL MEDICINE

## 2023-10-30 PROCEDURE — 99221 1ST HOSP IP/OBS SF/LOW 40: CPT | Performed by: UROLOGY

## 2023-10-30 PROCEDURE — 87075 CULTR BACTERIA EXCEPT BLOOD: CPT

## 2023-10-30 PROCEDURE — 6370000000 HC RX 637 (ALT 250 FOR IP)

## 2023-10-30 PROCEDURE — 87205 SMEAR GRAM STAIN: CPT

## 2023-10-30 PROCEDURE — 0W9G30Z DRAINAGE OF PERITONEAL CAVITY WITH DRAINAGE DEVICE, PERCUTANEOUS APPROACH: ICD-10-PCS | Performed by: RADIOLOGY

## 2023-10-30 PROCEDURE — 83935 ASSAY OF URINE OSMOLALITY: CPT

## 2023-10-30 PROCEDURE — 75989 ABSCESS DRAINAGE UNDER X-RAY: CPT

## 2023-10-30 PROCEDURE — 85025 COMPLETE CBC W/AUTO DIFF WBC: CPT

## 2023-10-30 PROCEDURE — 87077 CULTURE AEROBIC IDENTIFY: CPT

## 2023-10-30 PROCEDURE — 87186 SC STD MICRODIL/AGAR DIL: CPT

## 2023-10-30 PROCEDURE — 6370000000 HC RX 637 (ALT 250 FOR IP): Performed by: INTERNAL MEDICINE

## 2023-10-30 PROCEDURE — 1200000003 HC TELEMETRY R&B

## 2023-10-30 PROCEDURE — 84300 ASSAY OF URINE SODIUM: CPT

## 2023-10-30 PROCEDURE — 2709999900 CT GUIDED NEEDLE PLACEMENT

## 2023-10-30 PROCEDURE — 87070 CULTURE OTHR SPECIMN AEROBIC: CPT

## 2023-10-30 PROCEDURE — 99233 SBSQ HOSP IP/OBS HIGH 50: CPT | Performed by: INTERNAL MEDICINE

## 2023-10-30 RX ORDER — FLUCONAZOLE 2 MG/ML
200 INJECTION, SOLUTION INTRAVENOUS ONCE
Status: COMPLETED | OUTPATIENT
Start: 2023-10-30 | End: 2023-10-30

## 2023-10-30 RX ORDER — HYDROCODONE BITARTRATE AND ACETAMINOPHEN 5; 325 MG/1; MG/1
2 TABLET ORAL EVERY 4 HOURS PRN
Status: DISCONTINUED | OUTPATIENT
Start: 2023-10-30 | End: 2023-11-13 | Stop reason: HOSPADM

## 2023-10-30 RX ORDER — MIDAZOLAM HYDROCHLORIDE 1 MG/ML
INJECTION INTRAMUSCULAR; INTRAVENOUS PRN
Status: COMPLETED | OUTPATIENT
Start: 2023-10-30 | End: 2023-10-30

## 2023-10-30 RX ORDER — FENTANYL CITRATE 50 UG/ML
INJECTION, SOLUTION INTRAMUSCULAR; INTRAVENOUS PRN
Status: COMPLETED | OUTPATIENT
Start: 2023-10-30 | End: 2023-10-30

## 2023-10-30 RX ORDER — FLUCONAZOLE, SODIUM CHLORIDE 2 MG/ML
100 INJECTION INTRAVENOUS EVERY 24 HOURS
Status: DISCONTINUED | OUTPATIENT
Start: 2023-10-31 | End: 2023-10-31 | Stop reason: RX

## 2023-10-30 RX ORDER — SODIUM CHLORIDE 9 MG/ML
INJECTION, SOLUTION INTRAVENOUS CONTINUOUS
Status: DISCONTINUED | OUTPATIENT
Start: 2023-10-30 | End: 2023-11-07

## 2023-10-30 RX ORDER — HYDROCODONE BITARTRATE AND ACETAMINOPHEN 5; 325 MG/1; MG/1
1 TABLET ORAL EVERY 4 HOURS PRN
Status: DISCONTINUED | OUTPATIENT
Start: 2023-10-30 | End: 2023-11-13 | Stop reason: HOSPADM

## 2023-10-30 RX ADMIN — PANTOPRAZOLE SODIUM 40 MG: 40 TABLET, DELAYED RELEASE ORAL at 06:50

## 2023-10-30 RX ADMIN — ESCITALOPRAM OXALATE 20 MG: 20 TABLET ORAL at 08:32

## 2023-10-30 RX ADMIN — SUCRALFATE 1 G: 1 TABLET ORAL at 08:32

## 2023-10-30 RX ADMIN — FENTANYL CITRATE 25 MCG: 50 INJECTION, SOLUTION INTRAMUSCULAR; INTRAVENOUS at 13:37

## 2023-10-30 RX ADMIN — BUPROPION HYDROCHLORIDE 150 MG: 150 TABLET, FILM COATED, EXTENDED RELEASE ORAL at 08:33

## 2023-10-30 RX ADMIN — ATORVASTATIN CALCIUM 40 MG: 40 TABLET, FILM COATED ORAL at 00:28

## 2023-10-30 RX ADMIN — SUCRALFATE 1 G: 1 TABLET ORAL at 20:01

## 2023-10-30 RX ADMIN — QUETIAPINE FUMARATE 600 MG: 200 TABLET, EXTENDED RELEASE ORAL at 20:00

## 2023-10-30 RX ADMIN — CARVEDILOL 3.12 MG: 3.12 TABLET, FILM COATED ORAL at 16:37

## 2023-10-30 RX ADMIN — PIPERACILLIN AND TAZOBACTAM 3375 MG: 3; .375 INJECTION, POWDER, LYOPHILIZED, FOR SOLUTION INTRAVENOUS at 06:46

## 2023-10-30 RX ADMIN — QUETIAPINE FUMARATE 600 MG: 200 TABLET, EXTENDED RELEASE ORAL at 00:28

## 2023-10-30 RX ADMIN — PIPERACILLIN AND TAZOBACTAM 3375 MG: 3; .375 INJECTION, POWDER, LYOPHILIZED, FOR SOLUTION INTRAVENOUS at 23:42

## 2023-10-30 RX ADMIN — FOLIC ACID 1 MG: 1 TABLET ORAL at 08:32

## 2023-10-30 RX ADMIN — ACETAMINOPHEN 650 MG: 325 TABLET ORAL at 18:10

## 2023-10-30 RX ADMIN — FENOFIBRATE 160 MG: 160 TABLET ORAL at 08:32

## 2023-10-30 RX ADMIN — HYDROCODONE BITARTRATE AND ACETAMINOPHEN 2 TABLET: 5; 325 TABLET ORAL at 21:40

## 2023-10-30 RX ADMIN — ARIPIPRAZOLE 2 MG: 2 TABLET ORAL at 08:32

## 2023-10-30 RX ADMIN — TRAZODONE HYDROCHLORIDE 300 MG: 100 TABLET ORAL at 20:10

## 2023-10-30 RX ADMIN — PANTOPRAZOLE SODIUM 40 MG: 40 TABLET, DELAYED RELEASE ORAL at 16:37

## 2023-10-30 RX ADMIN — FERROUS SULFATE TAB 325 MG (65 MG ELEMENTAL FE) 325 MG: 325 (65 FE) TAB at 08:32

## 2023-10-30 RX ADMIN — ACETAMINOPHEN 650 MG: 325 TABLET ORAL at 11:04

## 2023-10-30 RX ADMIN — PIPERACILLIN AND TAZOBACTAM 3375 MG: 3; .375 INJECTION, POWDER, LYOPHILIZED, FOR SOLUTION INTRAVENOUS at 16:43

## 2023-10-30 RX ADMIN — FERROUS SULFATE TAB 325 MG (65 MG ELEMENTAL FE) 325 MG: 325 (65 FE) TAB at 20:01

## 2023-10-30 RX ADMIN — ATORVASTATIN CALCIUM 40 MG: 40 TABLET, FILM COATED ORAL at 20:01

## 2023-10-30 RX ADMIN — MIDAZOLAM 0.5 MG: 1 INJECTION INTRAMUSCULAR; INTRAVENOUS at 13:37

## 2023-10-30 RX ADMIN — SUCRALFATE 1 G: 1 TABLET ORAL at 16:37

## 2023-10-30 RX ADMIN — SODIUM CHLORIDE: 9 INJECTION, SOLUTION INTRAVENOUS at 14:16

## 2023-10-30 RX ADMIN — LEVOTHYROXINE SODIUM 75 MCG: 0.07 TABLET ORAL at 06:50

## 2023-10-30 RX ADMIN — SUCRALFATE 1 G: 1 TABLET ORAL at 00:29

## 2023-10-30 RX ADMIN — FLUCONAZOLE 200 MG: 200 INJECTION, SOLUTION INTRAVENOUS at 15:36

## 2023-10-30 RX ADMIN — CARVEDILOL 3.12 MG: 3.12 TABLET, FILM COATED ORAL at 08:33

## 2023-10-30 ASSESSMENT — PAIN DESCRIPTION - DESCRIPTORS
DESCRIPTORS: THROBBING
DESCRIPTORS: SHARP
DESCRIPTORS: ACHING
DESCRIPTORS: STABBING
DESCRIPTORS: THROBBING

## 2023-10-30 ASSESSMENT — PAIN DESCRIPTION - ORIENTATION
ORIENTATION: LEFT;LOWER
ORIENTATION: LEFT
ORIENTATION: LEFT;LOWER
ORIENTATION: LEFT;LOWER

## 2023-10-30 ASSESSMENT — PAIN - FUNCTIONAL ASSESSMENT: PAIN_FUNCTIONAL_ASSESSMENT: PREVENTS OR INTERFERES SOME ACTIVE ACTIVITIES AND ADLS

## 2023-10-30 ASSESSMENT — PAIN DESCRIPTION - LOCATION
LOCATION: ABDOMEN

## 2023-10-30 ASSESSMENT — PAIN SCALES - GENERAL
PAINLEVEL_OUTOF10: 9
PAINLEVEL_OUTOF10: 9
PAINLEVEL_OUTOF10: 8
PAINLEVEL_OUTOF10: 0
PAINLEVEL_OUTOF10: 8
PAINLEVEL_OUTOF10: 8
PAINLEVEL_OUTOF10: 9
PAINLEVEL_OUTOF10: 0

## 2023-10-30 ASSESSMENT — PAIN DESCRIPTION - PAIN TYPE: TYPE: ACUTE PAIN

## 2023-10-30 NOTE — H&P
[facility]  Sedation/Analgesia History & Physical    Pt Name: Malia Piedra  MRN: [id number]  YOB: 1955  Provider Performing Procedure: Monica Skinner MD, MD  Primary Care Physician: Aliza Rojas MD    Formulation and discussion of sedation / procedure plans, risks, benefits, side effects and alternatives with patient and/or responsible adult completed. PRE-PROCEDURE   DNR-CCA/DNR-CC []Yes [x]No  Brief History/Pre-Procedure Diagnosis: Abdominal fluid collection posterior to left kidney          MEDICAL HISTORY  []CAD/Valve  []Liver Disease  []Lung Disease []Diabetes  []Hypertension []Renal Disease  []Additional information:       has a past medical history of Acute renal failure with acute cortical necrosis ([address]), Allergic rhinitis, Arthritis, Bipolar 1 disorder ([address]), Blood circulation, collateral, Cancer ([address]), Cataract, Colon polyps, COPD (chronic obstructive pulmonary disease) ([address]), Coronary vasospasm (HCC), Depression, Diverticulitis of colon, GERD (gastroesophageal reflux disease), Glaucoma, Hearing loss, Hiatal hernia, History of arterial ischemic stroke, History of colonoscopy, History of kidney stones, Hx of blood clots, Hyperlipidemia, Hypertension, Liver disease, Pneumonia, Seasonal allergies, Sexual problems, Suicidal thoughts, Thyroid disease, Urinary incontinence, Vomiting, Wears dentures, and Wears glasses. SURGICAL HISTORY   has a past surgical history that includes Mandible fracture surgery (1984); shoulder surgery (2014); Colonoscopy (2011); Dilatation, esophagus; Elbow surgery (Right, 10/7/2004); Rotator cuff repair (2004, 2014); skin biopsy (2006); Cholecystectomy, laparoscopic (6/12/15); Elbow surgery (Bilateral, 2016); Carpal tunnel release (Bilateral, 2016); Hysterectomy (1998); cystoscopy w biopsy of bladder (N/A, 7/13/2020); Stimulator Surgery (N/A, 11/4/2020); Stimulator Surgery (N/A, 11/23/2020); Abdomen surgery;  Cystoscopy (N/A, 2/10/2022);

## 2023-10-30 NOTE — FLOWSHEET NOTE
10/30/23 [facility]   Safe Environment   Safety Measures Standard Safety Measures  (virtual nurse safety round complete)     Pt did not respond to voice, camera turned on for safety. Pt resting in bed with eyes closed. Chest visibly rising and falling. No apparent signs of distress.

## 2023-10-31 LAB
ANION GAP SERPL CALC-SCNC: 7 MEQ/L (ref 8–16)
BACTERIA UR CULT: ABNORMAL
BACTERIA UR CULT: ABNORMAL
BUN SERPL-MCNC: 11 MG/DL (ref 7–22)
CALCIUM SERPL-MCNC: 9.6 MG/DL (ref 8.5–10.5)
CHLORIDE SERPL-SCNC: 98 MEQ/L (ref 98–111)
CO2 SERPL-SCNC: 26 MEQ/L (ref 23–33)
CREAT SERPL-MCNC: 0.7 MG/DL (ref 0.4–1.2)
DEPRECATED RDW RBC AUTO: 46.9 FL (ref 35–45)
ERYTHROCYTE [DISTWIDTH] IN BLOOD BY AUTOMATED COUNT: 15.3 % (ref 11.5–14.5)
GFR SERPL CREATININE-BSD FRML MDRD: > 60 ML/MIN/1.73M2
GLUCOSE SERPL-MCNC: 91 MG/DL (ref 70–108)
HCT VFR BLD AUTO: 30.3 % (ref 37–47)
HGB BLD-MCNC: 8.9 GM/DL (ref 12–16)
MCH RBC QN AUTO: 24.5 PG (ref 26–33)
MCHC RBC AUTO-ENTMCNC: 29.4 GM/DL (ref 32.2–35.5)
MCV RBC AUTO: 83.2 FL (ref 81–99)
ORGANISM: ABNORMAL
ORGANISM: ABNORMAL
PLATELET # BLD AUTO: 465 THOU/MM3 (ref 130–400)
PMV BLD AUTO: 8.2 FL (ref 9.4–12.4)
POTASSIUM SERPL-SCNC: 4.3 MEQ/L (ref 3.5–5.2)
RBC # BLD AUTO: 3.64 MILL/MM3 (ref 4.2–5.4)
SODIUM SERPL-SCNC: 127 MEQ/L (ref 135–145)
SODIUM SERPL-SCNC: 130 MEQ/L (ref 135–145)
SODIUM SERPL-SCNC: 131 MEQ/L (ref 135–145)
SODIUM SERPL-SCNC: 133 MEQ/L (ref 135–145)
WBC # BLD AUTO: 9.5 THOU/MM3 (ref 4.8–10.8)

## 2023-10-31 PROCEDURE — 99233 SBSQ HOSP IP/OBS HIGH 50: CPT | Performed by: INTERNAL MEDICINE

## 2023-10-31 PROCEDURE — 86592 SYPHILIS TEST NON-TREP QUAL: CPT

## 2023-10-31 PROCEDURE — 6360000002 HC RX W HCPCS: Performed by: INTERNAL MEDICINE

## 2023-10-31 PROCEDURE — 36415 COLL VENOUS BLD VENIPUNCTURE: CPT

## 2023-10-31 PROCEDURE — 6370000000 HC RX 637 (ALT 250 FOR IP): Performed by: INTERNAL MEDICINE

## 2023-10-31 PROCEDURE — 87253 VIRUS INOCULATE TISSUE ADDL: CPT

## 2023-10-31 PROCEDURE — 2580000003 HC RX 258: Performed by: INTERNAL MEDICINE

## 2023-10-31 PROCEDURE — 87205 SMEAR GRAM STAIN: CPT

## 2023-10-31 PROCEDURE — 84295 ASSAY OF SERUM SODIUM: CPT

## 2023-10-31 PROCEDURE — 6370000000 HC RX 637 (ALT 250 FOR IP)

## 2023-10-31 PROCEDURE — 1200000000 HC SEMI PRIVATE

## 2023-10-31 PROCEDURE — 80048 BASIC METABOLIC PNL TOTAL CA: CPT

## 2023-10-31 PROCEDURE — 99231 SBSQ HOSP IP/OBS SF/LOW 25: CPT | Performed by: UROLOGY

## 2023-10-31 PROCEDURE — 87252 VIRUS INOCULATION TISSUE: CPT

## 2023-10-31 PROCEDURE — 1200000003 HC TELEMETRY R&B

## 2023-10-31 PROCEDURE — 87070 CULTURE OTHR SPECIMN AEROBIC: CPT

## 2023-10-31 PROCEDURE — 85027 COMPLETE CBC AUTOMATED: CPT

## 2023-10-31 RX ORDER — FLUCONAZOLE 2 MG/ML
100 INJECTION, SOLUTION INTRAVENOUS EVERY 24 HOURS
Status: DISCONTINUED | OUTPATIENT
Start: 2023-10-31 | End: 2023-11-03

## 2023-10-31 RX ORDER — TRAZODONE HYDROCHLORIDE 100 MG/1
200 TABLET ORAL NIGHTLY PRN
Status: DISCONTINUED | OUTPATIENT
Start: 2023-10-31 | End: 2023-11-13 | Stop reason: HOSPADM

## 2023-10-31 RX ADMIN — CARVEDILOL 3.12 MG: 3.12 TABLET, FILM COATED ORAL at 10:07

## 2023-10-31 RX ADMIN — FERROUS SULFATE TAB 325 MG (65 MG ELEMENTAL FE) 325 MG: 325 (65 FE) TAB at 19:35

## 2023-10-31 RX ADMIN — SUCRALFATE 1 G: 1 TABLET ORAL at 12:23

## 2023-10-31 RX ADMIN — ARIPIPRAZOLE 2 MG: 2 TABLET ORAL at 10:04

## 2023-10-31 RX ADMIN — FOLIC ACID 1 MG: 1 TABLET ORAL at 10:07

## 2023-10-31 RX ADMIN — SUCRALFATE 1 G: 1 TABLET ORAL at 19:34

## 2023-10-31 RX ADMIN — PANTOPRAZOLE SODIUM 40 MG: 40 TABLET, DELAYED RELEASE ORAL at 05:37

## 2023-10-31 RX ADMIN — CARVEDILOL 3.12 MG: 3.12 TABLET, FILM COATED ORAL at 18:08

## 2023-10-31 RX ADMIN — LEVOTHYROXINE SODIUM 75 MCG: 0.07 TABLET ORAL at 05:37

## 2023-10-31 RX ADMIN — HYDROCODONE BITARTRATE AND ACETAMINOPHEN 2 TABLET: 5; 325 TABLET ORAL at 19:35

## 2023-10-31 RX ADMIN — FLUCONAZOLE 100 MG: 200 INJECTION, SOLUTION INTRAVENOUS at 10:22

## 2023-10-31 RX ADMIN — FERROUS SULFATE TAB 325 MG (65 MG ELEMENTAL FE) 325 MG: 325 (65 FE) TAB at 10:05

## 2023-10-31 RX ADMIN — PIPERACILLIN AND TAZOBACTAM 3375 MG: 3; .375 INJECTION, POWDER, LYOPHILIZED, FOR SOLUTION INTRAVENOUS at 05:41

## 2023-10-31 RX ADMIN — SUCRALFATE 1 G: 1 TABLET ORAL at 10:06

## 2023-10-31 RX ADMIN — FENOFIBRATE 160 MG: 160 TABLET ORAL at 10:05

## 2023-10-31 RX ADMIN — PIPERACILLIN AND TAZOBACTAM 3375 MG: 3; .375 INJECTION, POWDER, LYOPHILIZED, FOR SOLUTION INTRAVENOUS at 15:20

## 2023-10-31 RX ADMIN — BUPROPION HYDROCHLORIDE 150 MG: 150 TABLET, FILM COATED, EXTENDED RELEASE ORAL at 10:04

## 2023-10-31 RX ADMIN — PIPERACILLIN AND TAZOBACTAM 3375 MG: 3; .375 INJECTION, POWDER, LYOPHILIZED, FOR SOLUTION INTRAVENOUS at 23:40

## 2023-10-31 RX ADMIN — ESCITALOPRAM OXALATE 20 MG: 20 TABLET ORAL at 10:06

## 2023-10-31 RX ADMIN — PANTOPRAZOLE SODIUM 40 MG: 40 TABLET, DELAYED RELEASE ORAL at 16:21

## 2023-10-31 RX ADMIN — QUETIAPINE FUMARATE 600 MG: 200 TABLET, EXTENDED RELEASE ORAL at 19:34

## 2023-10-31 RX ADMIN — SODIUM CHLORIDE: 9 INJECTION, SOLUTION INTRAVENOUS at 03:46

## 2023-10-31 RX ADMIN — ATORVASTATIN CALCIUM 40 MG: 40 TABLET, FILM COATED ORAL at 19:34

## 2023-10-31 RX ADMIN — SUCRALFATE 1 G: 1 TABLET ORAL at 16:21

## 2023-10-31 ASSESSMENT — PAIN DESCRIPTION - PAIN TYPE
TYPE: ACUTE PAIN

## 2023-10-31 ASSESSMENT — PAIN DESCRIPTION - DESCRIPTORS: DESCRIPTORS: ACHING;DULL

## 2023-10-31 ASSESSMENT — PAIN DESCRIPTION - ORIENTATION
ORIENTATION: LEFT;LOWER
ORIENTATION: LEFT;LOWER

## 2023-10-31 ASSESSMENT — PAIN SCALES - GENERAL
PAINLEVEL_OUTOF10: 8

## 2023-10-31 ASSESSMENT — PAIN DESCRIPTION - LOCATION
LOCATION: ABDOMEN

## 2023-10-31 NOTE — FLOWSHEET NOTE
10/31/23 1034   Safe Environment   Safety Measures Bed in low position;Call light within reach; Side rails X 2  (Virtual Nurse Safety Rounds Complete)     Call placed to patients room, patient did not respond to audio, video turned on for safety purposes. Patient is resting in bed with eyes closed, call light within reach, no signs or symptoms of distress noted.

## 2023-10-31 NOTE — FLOWSHEET NOTE
10/31/23 [facility]   Safe Environment   Safety Measures Other (comment)  (Virtual Nurse Safety Rounds)     Call placed to patients room, patient responds to audio, permitted video. Patient alert and oriented. Staff present in room with patient.

## 2023-11-01 LAB
ANION GAP SERPL CALC-SCNC: 10 MEQ/L (ref 8–16)
BUN SERPL-MCNC: 9 MG/DL (ref 7–22)
CALCIUM SERPL-MCNC: 9.7 MG/DL (ref 8.5–10.5)
CHLORIDE SERPL-SCNC: 98 MEQ/L (ref 98–111)
CO2 SERPL-SCNC: 24 MEQ/L (ref 23–33)
CREAT SERPL-MCNC: 0.7 MG/DL (ref 0.4–1.2)
DEPRECATED RDW RBC AUTO: 48 FL (ref 35–45)
ERYTHROCYTE [DISTWIDTH] IN BLOOD BY AUTOMATED COUNT: 15.6 % (ref 11.5–14.5)
GFR SERPL CREATININE-BSD FRML MDRD: > 60 ML/MIN/1.73M2
GLUCOSE SERPL-MCNC: 141 MG/DL (ref 70–108)
HCT VFR BLD AUTO: 29.1 % (ref 37–47)
HGB BLD-MCNC: 8.5 GM/DL (ref 12–16)
MCH RBC QN AUTO: 24.6 PG (ref 26–33)
MCHC RBC AUTO-ENTMCNC: 29.2 GM/DL (ref 32.2–35.5)
MCV RBC AUTO: 84.3 FL (ref 81–99)
PLATELET # BLD AUTO: 446 THOU/MM3 (ref 130–400)
PMV BLD AUTO: 8.3 FL (ref 9.4–12.4)
POTASSIUM SERPL-SCNC: 4.6 MEQ/L (ref 3.5–5.2)
RBC # BLD AUTO: 3.45 MILL/MM3 (ref 4.2–5.4)
RPR SER QL: NONREACTIVE
SODIUM SERPL-SCNC: 131 MEQ/L (ref 135–145)
SODIUM SERPL-SCNC: 132 MEQ/L (ref 135–145)
SODIUM SERPL-SCNC: 136 MEQ/L (ref 135–145)
WBC # BLD AUTO: 7.5 THOU/MM3 (ref 4.8–10.8)

## 2023-11-01 PROCEDURE — 84207 ASSAY OF VITAMIN B-6: CPT

## 2023-11-01 PROCEDURE — 6370000000 HC RX 637 (ALT 250 FOR IP): Performed by: INTERNAL MEDICINE

## 2023-11-01 PROCEDURE — 6360000002 HC RX W HCPCS: Performed by: INTERNAL MEDICINE

## 2023-11-01 PROCEDURE — 85027 COMPLETE CBC AUTOMATED: CPT

## 2023-11-01 PROCEDURE — 6370000000 HC RX 637 (ALT 250 FOR IP): Performed by: STUDENT IN AN ORGANIZED HEALTH CARE EDUCATION/TRAINING PROGRAM

## 2023-11-01 PROCEDURE — 80048 BASIC METABOLIC PNL TOTAL CA: CPT

## 2023-11-01 PROCEDURE — 6360000002 HC RX W HCPCS: Performed by: STUDENT IN AN ORGANIZED HEALTH CARE EDUCATION/TRAINING PROGRAM

## 2023-11-01 PROCEDURE — 36415 COLL VENOUS BLD VENIPUNCTURE: CPT

## 2023-11-01 PROCEDURE — 84295 ASSAY OF SERUM SODIUM: CPT

## 2023-11-01 PROCEDURE — 2580000003 HC RX 258: Performed by: INTERNAL MEDICINE

## 2023-11-01 PROCEDURE — 1200000000 HC SEMI PRIVATE

## 2023-11-01 PROCEDURE — 6370000000 HC RX 637 (ALT 250 FOR IP)

## 2023-11-01 PROCEDURE — 99232 SBSQ HOSP IP/OBS MODERATE 35: CPT | Performed by: STUDENT IN AN ORGANIZED HEALTH CARE EDUCATION/TRAINING PROGRAM

## 2023-11-01 PROCEDURE — 1200000003 HC TELEMETRY R&B

## 2023-11-01 RX ORDER — ENOXAPARIN SODIUM 100 MG/ML
40 INJECTION SUBCUTANEOUS DAILY
Status: DISCONTINUED | OUTPATIENT
Start: 2023-11-01 | End: 2023-11-13 | Stop reason: HOSPADM

## 2023-11-01 RX ORDER — ASPIRIN 81 MG/1
81 TABLET ORAL DAILY
Status: DISCONTINUED | OUTPATIENT
Start: 2023-11-01 | End: 2023-11-13 | Stop reason: HOSPADM

## 2023-11-01 RX ADMIN — ENOXAPARIN SODIUM 40 MG: 100 INJECTION SUBCUTANEOUS at 10:39

## 2023-11-01 RX ADMIN — FOLIC ACID 1 MG: 1 TABLET ORAL at 10:40

## 2023-11-01 RX ADMIN — PIPERACILLIN AND TAZOBACTAM 3375 MG: 3; .375 INJECTION, POWDER, LYOPHILIZED, FOR SOLUTION INTRAVENOUS at 22:53

## 2023-11-01 RX ADMIN — PANTOPRAZOLE SODIUM 40 MG: 40 TABLET, DELAYED RELEASE ORAL at 05:23

## 2023-11-01 RX ADMIN — FENOFIBRATE 160 MG: 160 TABLET ORAL at 10:40

## 2023-11-01 RX ADMIN — FLUCONAZOLE 100 MG: 200 INJECTION, SOLUTION INTRAVENOUS at 11:54

## 2023-11-01 RX ADMIN — ESCITALOPRAM OXALATE 20 MG: 20 TABLET ORAL at 10:40

## 2023-11-01 RX ADMIN — PIPERACILLIN AND TAZOBACTAM 3375 MG: 3; .375 INJECTION, POWDER, LYOPHILIZED, FOR SOLUTION INTRAVENOUS at 16:38

## 2023-11-01 RX ADMIN — ASPIRIN 81 MG: 81 TABLET, COATED ORAL at 10:38

## 2023-11-01 RX ADMIN — PANTOPRAZOLE SODIUM 40 MG: 40 TABLET, DELAYED RELEASE ORAL at 16:26

## 2023-11-01 RX ADMIN — SODIUM CHLORIDE: 9 INJECTION, SOLUTION INTRAVENOUS at 16:46

## 2023-11-01 RX ADMIN — QUETIAPINE FUMARATE 600 MG: 200 TABLET, EXTENDED RELEASE ORAL at 19:51

## 2023-11-01 RX ADMIN — HYDROCODONE BITARTRATE AND ACETAMINOPHEN 2 TABLET: 5; 325 TABLET ORAL at 19:50

## 2023-11-01 RX ADMIN — SUCRALFATE 1 G: 1 TABLET ORAL at 16:26

## 2023-11-01 RX ADMIN — CARVEDILOL 3.12 MG: 3.12 TABLET, FILM COATED ORAL at 10:39

## 2023-11-01 RX ADMIN — LEVOTHYROXINE SODIUM 75 MCG: 0.07 TABLET ORAL at 05:23

## 2023-11-01 RX ADMIN — CARVEDILOL 3.12 MG: 3.12 TABLET, FILM COATED ORAL at 19:51

## 2023-11-01 RX ADMIN — ATORVASTATIN CALCIUM 40 MG: 40 TABLET, FILM COATED ORAL at 19:52

## 2023-11-01 RX ADMIN — PIPERACILLIN AND TAZOBACTAM 3375 MG: 3; .375 INJECTION, POWDER, LYOPHILIZED, FOR SOLUTION INTRAVENOUS at 06:17

## 2023-11-01 RX ADMIN — SUCRALFATE 1 G: 1 TABLET ORAL at 13:11

## 2023-11-01 RX ADMIN — SUCRALFATE 1 G: 1 TABLET ORAL at 10:40

## 2023-11-01 RX ADMIN — SUCRALFATE 1 G: 1 TABLET ORAL at 19:52

## 2023-11-01 RX ADMIN — FERROUS SULFATE TAB 325 MG (65 MG ELEMENTAL FE) 325 MG: 325 (65 FE) TAB at 10:40

## 2023-11-01 RX ADMIN — ARIPIPRAZOLE 2 MG: 2 TABLET ORAL at 10:38

## 2023-11-01 RX ADMIN — HYDROCODONE BITARTRATE AND ACETAMINOPHEN 2 TABLET: 5; 325 TABLET ORAL at 05:21

## 2023-11-01 RX ADMIN — BUPROPION HYDROCHLORIDE 150 MG: 150 TABLET, FILM COATED, EXTENDED RELEASE ORAL at 10:39

## 2023-11-01 RX ADMIN — FERROUS SULFATE TAB 325 MG (65 MG ELEMENTAL FE) 325 MG: 325 (65 FE) TAB at 19:52

## 2023-11-01 ASSESSMENT — PAIN SCALES - WONG BAKER
WONGBAKER_NUMERICALRESPONSE: 0

## 2023-11-01 ASSESSMENT — PAIN SCALES - GENERAL
PAINLEVEL_OUTOF10: 0
PAINLEVEL_OUTOF10: 0
PAINLEVEL_OUTOF10: 8
PAINLEVEL_OUTOF10: 0
PAINLEVEL_OUTOF10: 0
PAINLEVEL_OUTOF10: 8
PAINLEVEL_OUTOF10: 9
PAINLEVEL_OUTOF10: 8
PAINLEVEL_OUTOF10: 0
PAINLEVEL_OUTOF10: 8
PAINLEVEL_OUTOF10: 0
PAINLEVEL_OUTOF10: 0
PAINLEVEL_OUTOF10: 8
PAINLEVEL_OUTOF10: 0
PAINLEVEL_OUTOF10: 0
PAINLEVEL_OUTOF10: 8
PAINLEVEL_OUTOF10: 0
PAINLEVEL_OUTOF10: 0

## 2023-11-01 ASSESSMENT — PAIN DESCRIPTION - LOCATION
LOCATION: ABDOMEN

## 2023-11-01 ASSESSMENT — PAIN DESCRIPTION - FREQUENCY: FREQUENCY: INTERMITTENT

## 2023-11-01 ASSESSMENT — PAIN DESCRIPTION - ORIENTATION
ORIENTATION: LEFT;LOWER

## 2023-11-01 ASSESSMENT — PAIN DESCRIPTION - DESCRIPTORS
DESCRIPTORS: STABBING
DESCRIPTORS: STABBING
DESCRIPTORS: SHARP
DESCRIPTORS: STABBING

## 2023-11-01 ASSESSMENT — PAIN - FUNCTIONAL ASSESSMENT
PAIN_FUNCTIONAL_ASSESSMENT: PREVENTS OR INTERFERES SOME ACTIVE ACTIVITIES AND ADLS
PAIN_FUNCTIONAL_ASSESSMENT: PREVENTS OR INTERFERES SOME ACTIVE ACTIVITIES AND ADLS
PAIN_FUNCTIONAL_ASSESSMENT: ACTIVITIES ARE NOT PREVENTED
PAIN_FUNCTIONAL_ASSESSMENT: ACTIVITIES ARE NOT PREVENTED

## 2023-11-01 NOTE — FLOWSHEET NOTE
11/01/23 1109   Safe Environment   Safety Measures Other (comment)  (virtual safety round)     Pt in restroom with staff at this time.

## 2023-11-01 NOTE — FLOWSHEET NOTE
11/01/23 1323   Safe Environment   Safety Measures Other (comment)  (VN safety round)     VN called into patients room and introduced myself and role. Staff answered and declined needs at this time.

## 2023-11-02 ENCOUNTER — APPOINTMENT (OUTPATIENT)
Dept: CT IMAGING | Age: 68
DRG: 690 | End: 2023-11-02
Payer: MEDICARE

## 2023-11-02 PROBLEM — B00.9 HSV-2 (HERPES SIMPLEX VIRUS 2) INFECTION: Status: ACTIVE | Noted: 2023-11-02

## 2023-11-02 LAB
BACTERIA GENITAL AEROBE CULT: ABNORMAL
BACTERIA SPEC AEROBE CULT: ABNORMAL
BACTERIA SPEC AEROBE CULT: ABNORMAL
BACTERIA SPEC ANAEROBE CULT: ABNORMAL
GRAM STN SPEC: ABNORMAL
GRAM STN SPEC: ABNORMAL
HSV SPEC CULT: NORMAL
HSV1 ISLT QL IF: NORMAL
ORGANISM: ABNORMAL
ORGANISM: ABNORMAL
SODIUM SERPL-SCNC: 132 MEQ/L (ref 135–145)
SODIUM SERPL-SCNC: 135 MEQ/L (ref 135–145)
SODIUM SERPL-SCNC: 135 MEQ/L (ref 135–145)

## 2023-11-02 PROCEDURE — 87140 CULTURE TYPE IMMUNOFLUORESC: CPT

## 2023-11-02 PROCEDURE — 6360000002 HC RX W HCPCS: Performed by: INTERNAL MEDICINE

## 2023-11-02 PROCEDURE — 2580000003 HC RX 258: Performed by: INTERNAL MEDICINE

## 2023-11-02 PROCEDURE — 84295 ASSAY OF SERUM SODIUM: CPT

## 2023-11-02 PROCEDURE — 99232 SBSQ HOSP IP/OBS MODERATE 35: CPT | Performed by: STUDENT IN AN ORGANIZED HEALTH CARE EDUCATION/TRAINING PROGRAM

## 2023-11-02 PROCEDURE — 36415 COLL VENOUS BLD VENIPUNCTURE: CPT

## 2023-11-02 PROCEDURE — 6370000000 HC RX 637 (ALT 250 FOR IP): Performed by: INTERNAL MEDICINE

## 2023-11-02 PROCEDURE — 1200000000 HC SEMI PRIVATE

## 2023-11-02 PROCEDURE — 6370000000 HC RX 637 (ALT 250 FOR IP)

## 2023-11-02 PROCEDURE — 6370000000 HC RX 637 (ALT 250 FOR IP): Performed by: STUDENT IN AN ORGANIZED HEALTH CARE EDUCATION/TRAINING PROGRAM

## 2023-11-02 PROCEDURE — 6360000002 HC RX W HCPCS: Performed by: STUDENT IN AN ORGANIZED HEALTH CARE EDUCATION/TRAINING PROGRAM

## 2023-11-02 PROCEDURE — 99232 SBSQ HOSP IP/OBS MODERATE 35: CPT | Performed by: NURSE PRACTITIONER

## 2023-11-02 PROCEDURE — 74176 CT ABD & PELVIS W/O CONTRAST: CPT

## 2023-11-02 RX ORDER — ACYCLOVIR 200 MG/1
400 CAPSULE ORAL 3 TIMES DAILY
Status: COMPLETED | OUTPATIENT
Start: 2023-11-02 | End: 2023-11-08

## 2023-11-02 RX ADMIN — FLUCONAZOLE 100 MG: 200 INJECTION, SOLUTION INTRAVENOUS at 11:14

## 2023-11-02 RX ADMIN — SUCRALFATE 1 G: 1 TABLET ORAL at 17:03

## 2023-11-02 RX ADMIN — CARVEDILOL 3.12 MG: 3.12 TABLET, FILM COATED ORAL at 08:14

## 2023-11-02 RX ADMIN — ENOXAPARIN SODIUM 40 MG: 100 INJECTION SUBCUTANEOUS at 08:14

## 2023-11-02 RX ADMIN — ACYCLOVIR 400 MG: 200 CAPSULE ORAL at 21:34

## 2023-11-02 RX ADMIN — ESCITALOPRAM OXALATE 20 MG: 20 TABLET ORAL at 08:14

## 2023-11-02 RX ADMIN — FERROUS SULFATE TAB 325 MG (65 MG ELEMENTAL FE) 325 MG: 325 (65 FE) TAB at 08:14

## 2023-11-02 RX ADMIN — PANTOPRAZOLE SODIUM 40 MG: 40 TABLET, DELAYED RELEASE ORAL at 17:03

## 2023-11-02 RX ADMIN — HYDROCODONE BITARTRATE AND ACETAMINOPHEN 1 TABLET: 5; 325 TABLET ORAL at 17:03

## 2023-11-02 RX ADMIN — SUCRALFATE 1 G: 1 TABLET ORAL at 19:52

## 2023-11-02 RX ADMIN — QUETIAPINE FUMARATE 600 MG: 200 TABLET, EXTENDED RELEASE ORAL at 19:52

## 2023-11-02 RX ADMIN — CARVEDILOL 3.12 MG: 3.12 TABLET, FILM COATED ORAL at 17:03

## 2023-11-02 RX ADMIN — PANTOPRAZOLE SODIUM 40 MG: 40 TABLET, DELAYED RELEASE ORAL at 06:24

## 2023-11-02 RX ADMIN — PIPERACILLIN AND TAZOBACTAM 3375 MG: 3; .375 INJECTION, POWDER, LYOPHILIZED, FOR SOLUTION INTRAVENOUS at 08:13

## 2023-11-02 RX ADMIN — SUCRALFATE 1 G: 1 TABLET ORAL at 13:02

## 2023-11-02 RX ADMIN — ATORVASTATIN CALCIUM 40 MG: 40 TABLET, FILM COATED ORAL at 19:52

## 2023-11-02 RX ADMIN — ASPIRIN 81 MG: 81 TABLET, COATED ORAL at 08:14

## 2023-11-02 RX ADMIN — LEVOTHYROXINE SODIUM 75 MCG: 0.07 TABLET ORAL at 06:24

## 2023-11-02 RX ADMIN — ARIPIPRAZOLE 2 MG: 2 TABLET ORAL at 08:14

## 2023-11-02 RX ADMIN — HYDROCODONE BITARTRATE AND ACETAMINOPHEN 2 TABLET: 5; 325 TABLET ORAL at 11:11

## 2023-11-02 RX ADMIN — FERROUS SULFATE TAB 325 MG (65 MG ELEMENTAL FE) 325 MG: 325 (65 FE) TAB at 19:52

## 2023-11-02 RX ADMIN — SUCRALFATE 1 G: 1 TABLET ORAL at 08:14

## 2023-11-02 RX ADMIN — PIPERACILLIN AND TAZOBACTAM 3375 MG: 3; .375 INJECTION, POWDER, LYOPHILIZED, FOR SOLUTION INTRAVENOUS at 14:57

## 2023-11-02 RX ADMIN — FOLIC ACID 1 MG: 1 TABLET ORAL at 08:14

## 2023-11-02 RX ADMIN — PIPERACILLIN AND TAZOBACTAM 3375 MG: 3; .375 INJECTION, POWDER, LYOPHILIZED, FOR SOLUTION INTRAVENOUS at 23:16

## 2023-11-02 RX ADMIN — FENOFIBRATE 160 MG: 160 TABLET ORAL at 08:14

## 2023-11-02 RX ADMIN — HYDROCODONE BITARTRATE AND ACETAMINOPHEN 1 TABLET: 5; 325 TABLET ORAL at 21:34

## 2023-11-02 RX ADMIN — BUPROPION HYDROCHLORIDE 150 MG: 150 TABLET, FILM COATED, EXTENDED RELEASE ORAL at 08:14

## 2023-11-02 ASSESSMENT — PAIN SCALES - GENERAL
PAINLEVEL_OUTOF10: 7
PAINLEVEL_OUTOF10: 6
PAINLEVEL_OUTOF10: 6
PAINLEVEL_OUTOF10: 8
PAINLEVEL_OUTOF10: 9
PAINLEVEL_OUTOF10: 6

## 2023-11-02 ASSESSMENT — PAIN DESCRIPTION - ORIENTATION
ORIENTATION: LEFT
ORIENTATION: LEFT

## 2023-11-02 ASSESSMENT — PAIN DESCRIPTION - LOCATION
LOCATION: ABDOMEN
LOCATION: ABDOMEN;BACK
LOCATION: ABDOMEN

## 2023-11-02 ASSESSMENT — PAIN SCALES - WONG BAKER: WONGBAKER_NUMERICALRESPONSE: 2

## 2023-11-02 ASSESSMENT — PAIN DESCRIPTION - DESCRIPTORS: DESCRIPTORS: ACHING

## 2023-11-02 NOTE — FLOWSHEET NOTE
11/02/23 1213   Safe Environment   Safety Measures Bed in low position;Call light within reach; Side rails X 2  (Virtual Nurse Safety Round Completed)     Call placed to patients room, patient responds to audio, permitted video. Patient alert and oriented. Patient denied any concerns/complaints at this time. Patient educated on utilizing call light. Call light within reach, no signs or symptoms of distress noted.

## 2023-11-02 NOTE — FLOWSHEET NOTE
11/02/23 2498   Safe Environment   Safety Measures Bed in low position;Call light within reach; Side rails X 2  (Virtual Nurse Safety Round Completed)     Call placed to patients room, patient did not respond to audio, video turned on for safety purposes. Patient is resting in bed with eyes closed, call light within reach, no signs or symtpoms of distress noted.

## 2023-11-03 LAB
ANION GAP SERPL CALC-SCNC: 8 MEQ/L (ref 8–16)
BACTERIA BLD AEROBE CULT: NORMAL
BACTERIA BLD AEROBE CULT: NORMAL
BUN SERPL-MCNC: 9 MG/DL (ref 7–22)
CALCIUM SERPL-MCNC: 9.6 MG/DL (ref 8.5–10.5)
CHLORIDE SERPL-SCNC: 104 MEQ/L (ref 98–111)
CO2 SERPL-SCNC: 27 MEQ/L (ref 23–33)
CREAT SERPL-MCNC: 0.6 MG/DL (ref 0.4–1.2)
DEPRECATED RDW RBC AUTO: 47.9 FL (ref 35–45)
ERYTHROCYTE [DISTWIDTH] IN BLOOD BY AUTOMATED COUNT: 15.7 % (ref 11.5–14.5)
GFR SERPL CREATININE-BSD FRML MDRD: > 60 ML/MIN/1.73M2
GLUCOSE SERPL-MCNC: 146 MG/DL (ref 70–108)
HCT VFR BLD AUTO: 29.1 % (ref 37–47)
HGB BLD-MCNC: 8.5 GM/DL (ref 12–16)
MCH RBC QN AUTO: 24.5 PG (ref 26–33)
MCHC RBC AUTO-ENTMCNC: 29.2 GM/DL (ref 32.2–35.5)
MCV RBC AUTO: 83.9 FL (ref 81–99)
PLATELET # BLD AUTO: 445 THOU/MM3 (ref 130–400)
PMV BLD AUTO: 8.2 FL (ref 9.4–12.4)
POTASSIUM SERPL-SCNC: 4.3 MEQ/L (ref 3.5–5.2)
RBC # BLD AUTO: 3.47 MILL/MM3 (ref 4.2–5.4)
SODIUM SERPL-SCNC: 139 MEQ/L (ref 135–145)
WBC # BLD AUTO: 5.8 THOU/MM3 (ref 4.8–10.8)

## 2023-11-03 PROCEDURE — 99232 SBSQ HOSP IP/OBS MODERATE 35: CPT | Performed by: STUDENT IN AN ORGANIZED HEALTH CARE EDUCATION/TRAINING PROGRAM

## 2023-11-03 PROCEDURE — 36415 COLL VENOUS BLD VENIPUNCTURE: CPT

## 2023-11-03 PROCEDURE — 6360000002 HC RX W HCPCS: Performed by: STUDENT IN AN ORGANIZED HEALTH CARE EDUCATION/TRAINING PROGRAM

## 2023-11-03 PROCEDURE — 6370000000 HC RX 637 (ALT 250 FOR IP): Performed by: INTERNAL MEDICINE

## 2023-11-03 PROCEDURE — 6370000000 HC RX 637 (ALT 250 FOR IP): Performed by: STUDENT IN AN ORGANIZED HEALTH CARE EDUCATION/TRAINING PROGRAM

## 2023-11-03 PROCEDURE — 1200000000 HC SEMI PRIVATE

## 2023-11-03 PROCEDURE — 6370000000 HC RX 637 (ALT 250 FOR IP)

## 2023-11-03 PROCEDURE — 80048 BASIC METABOLIC PNL TOTAL CA: CPT

## 2023-11-03 PROCEDURE — 99232 SBSQ HOSP IP/OBS MODERATE 35: CPT | Performed by: NURSE PRACTITIONER

## 2023-11-03 PROCEDURE — 94640 AIRWAY INHALATION TREATMENT: CPT

## 2023-11-03 PROCEDURE — 85027 COMPLETE CBC AUTOMATED: CPT

## 2023-11-03 PROCEDURE — 6360000002 HC RX W HCPCS: Performed by: INTERNAL MEDICINE

## 2023-11-03 PROCEDURE — 2580000003 HC RX 258: Performed by: INTERNAL MEDICINE

## 2023-11-03 RX ADMIN — SUCRALFATE 1 G: 1 TABLET ORAL at 10:32

## 2023-11-03 RX ADMIN — FOLIC ACID 1 MG: 1 TABLET ORAL at 10:33

## 2023-11-03 RX ADMIN — HYDROCODONE BITARTRATE AND ACETAMINOPHEN 2 TABLET: 5; 325 TABLET ORAL at 19:43

## 2023-11-03 RX ADMIN — SUCRALFATE 1 G: 1 TABLET ORAL at 17:18

## 2023-11-03 RX ADMIN — ACYCLOVIR 400 MG: 200 CAPSULE ORAL at 19:40

## 2023-11-03 RX ADMIN — FERROUS SULFATE TAB 325 MG (65 MG ELEMENTAL FE) 325 MG: 325 (65 FE) TAB at 10:34

## 2023-11-03 RX ADMIN — QUETIAPINE FUMARATE 600 MG: 200 TABLET, EXTENDED RELEASE ORAL at 19:40

## 2023-11-03 RX ADMIN — ATORVASTATIN CALCIUM 40 MG: 40 TABLET, FILM COATED ORAL at 19:40

## 2023-11-03 RX ADMIN — MOMETASONE FUROATE AND FORMOTEROL FUMARATE DIHYDRATE 2 PUFF: 200; 5 AEROSOL RESPIRATORY (INHALATION) at 21:36

## 2023-11-03 RX ADMIN — ESCITALOPRAM OXALATE 20 MG: 20 TABLET ORAL at 10:34

## 2023-11-03 RX ADMIN — ACYCLOVIR 400 MG: 200 CAPSULE ORAL at 14:19

## 2023-11-03 RX ADMIN — CARVEDILOL 3.12 MG: 3.12 TABLET, FILM COATED ORAL at 10:33

## 2023-11-03 RX ADMIN — TRAZODONE HYDROCHLORIDE 200 MG: 100 TABLET ORAL at 19:43

## 2023-11-03 RX ADMIN — SUCRALFATE 1 G: 1 TABLET ORAL at 19:40

## 2023-11-03 RX ADMIN — ENOXAPARIN SODIUM 40 MG: 100 INJECTION SUBCUTANEOUS at 10:30

## 2023-11-03 RX ADMIN — CARVEDILOL 3.12 MG: 3.12 TABLET, FILM COATED ORAL at 17:18

## 2023-11-03 RX ADMIN — HYDROCODONE BITARTRATE AND ACETAMINOPHEN 2 TABLET: 5; 325 TABLET ORAL at 15:47

## 2023-11-03 RX ADMIN — FENOFIBRATE 160 MG: 160 TABLET ORAL at 10:31

## 2023-11-03 RX ADMIN — HYDROCODONE BITARTRATE AND ACETAMINOPHEN 2 TABLET: 5; 325 TABLET ORAL at 10:34

## 2023-11-03 RX ADMIN — BUPROPION HYDROCHLORIDE 150 MG: 150 TABLET, FILM COATED, EXTENDED RELEASE ORAL at 10:31

## 2023-11-03 RX ADMIN — PIPERACILLIN AND TAZOBACTAM 3375 MG: 3; .375 INJECTION, POWDER, LYOPHILIZED, FOR SOLUTION INTRAVENOUS at 23:04

## 2023-11-03 RX ADMIN — PIPERACILLIN AND TAZOBACTAM 3375 MG: 3; .375 INJECTION, POWDER, LYOPHILIZED, FOR SOLUTION INTRAVENOUS at 05:56

## 2023-11-03 RX ADMIN — FERROUS SULFATE TAB 325 MG (65 MG ELEMENTAL FE) 325 MG: 325 (65 FE) TAB at 19:41

## 2023-11-03 RX ADMIN — ARIPIPRAZOLE 2 MG: 2 TABLET ORAL at 10:31

## 2023-11-03 RX ADMIN — PANTOPRAZOLE SODIUM 40 MG: 40 TABLET, DELAYED RELEASE ORAL at 05:53

## 2023-11-03 RX ADMIN — LEVOTHYROXINE SODIUM 75 MCG: 0.07 TABLET ORAL at 05:53

## 2023-11-03 RX ADMIN — ASPIRIN 81 MG: 81 TABLET, COATED ORAL at 10:37

## 2023-11-03 RX ADMIN — ACYCLOVIR 400 MG: 200 CAPSULE ORAL at 10:31

## 2023-11-03 RX ADMIN — HYDROCODONE BITARTRATE AND ACETAMINOPHEN 1 TABLET: 5; 325 TABLET ORAL at 05:53

## 2023-11-03 RX ADMIN — SUCRALFATE 1 G: 1 TABLET ORAL at 14:19

## 2023-11-03 RX ADMIN — PIPERACILLIN AND TAZOBACTAM 3375 MG: 3; .375 INJECTION, POWDER, LYOPHILIZED, FOR SOLUTION INTRAVENOUS at 15:51

## 2023-11-03 RX ADMIN — PANTOPRAZOLE SODIUM 40 MG: 40 TABLET, DELAYED RELEASE ORAL at 15:46

## 2023-11-03 RX ADMIN — SODIUM CHLORIDE, PRESERVATIVE FREE 10 ML: 5 INJECTION INTRAVENOUS at 10:56

## 2023-11-03 ASSESSMENT — PAIN DESCRIPTION - FREQUENCY
FREQUENCY: CONTINUOUS
FREQUENCY: CONTINUOUS

## 2023-11-03 ASSESSMENT — PAIN DESCRIPTION - ORIENTATION
ORIENTATION: LEFT

## 2023-11-03 ASSESSMENT — PAIN DESCRIPTION - DESCRIPTORS
DESCRIPTORS: CRAMPING
DESCRIPTORS: CRAMPING;SORE
DESCRIPTORS: CRAMPING
DESCRIPTORS: DISCOMFORT

## 2023-11-03 ASSESSMENT — PAIN SCALES - GENERAL
PAINLEVEL_OUTOF10: 5
PAINLEVEL_OUTOF10: 8
PAINLEVEL_OUTOF10: 9
PAINLEVEL_OUTOF10: 6
PAINLEVEL_OUTOF10: 8
PAINLEVEL_OUTOF10: 3
PAINLEVEL_OUTOF10: 8

## 2023-11-03 ASSESSMENT — PAIN DESCRIPTION - LOCATION
LOCATION: ABDOMEN
LOCATION: FLANK
LOCATION: ABDOMEN
LOCATION: ABDOMEN

## 2023-11-03 ASSESSMENT — PAIN - FUNCTIONAL ASSESSMENT
PAIN_FUNCTIONAL_ASSESSMENT: PREVENTS OR INTERFERES SOME ACTIVE ACTIVITIES AND ADLS
PAIN_FUNCTIONAL_ASSESSMENT: PREVENTS OR INTERFERES SOME ACTIVE ACTIVITIES AND ADLS
PAIN_FUNCTIONAL_ASSESSMENT: ACTIVITIES ARE NOT PREVENTED

## 2023-11-03 ASSESSMENT — PAIN DESCRIPTION - ONSET
ONSET: ON-GOING
ONSET: ON-GOING

## 2023-11-03 ASSESSMENT — PAIN SCALES - WONG BAKER
WONGBAKER_NUMERICALRESPONSE: 0
WONGBAKER_NUMERICALRESPONSE: 8

## 2023-11-03 ASSESSMENT — PAIN DESCRIPTION - PAIN TYPE
TYPE: ACUTE PAIN
TYPE: ACUTE PAIN

## 2023-11-03 NOTE — FLOWSHEET NOTE
11/03/23 4118   Safe Environment   Safety Measures Call light within reach;Standard Safety Measures  (safety virtual round completed)     Virtual nurse completed safety round with patient sleeping at the moment; safe in bed. Will follow up later. Thank you.

## 2023-11-03 NOTE — FLOWSHEET NOTE
11/03/23 1033   Safe Environment   Safety Measures Bed in low position;Call light within reach; Side rails X 2  (Virtual Nurse Safety Round Complete)     Call placed to patients room, patient responds to audio, permitted video. Staff at bedside. Patient alert and oriented. Patient denied any voiced conscerns/complaints at this time. Patient educated on utilizig call light. Call light within reach, no signs or symtpoms of distress noted.

## 2023-11-03 NOTE — FLOWSHEET NOTE
11/03/23 1954   Safe Environment   Safety Measures Call light within reach;Standard Safety Measures  (virtual safety round completed)     Virtual nurse completed safety round with patient safe in bed stating no concerns at the moment. Thank you.

## 2023-11-03 NOTE — CONSULTS
[place] [address]                                  CONSULTATION    PATIENT NAME: Guillermo Morejon                  :        1955  MED REC NO:   [id number]                           ROOM:       0028  ACCOUNT NO:   [de-identified]                           ADMIT DATE: 10/29/2023  PROVIDER:     Joss Lemus. Lion Boston M.D.    Denisemanuelito Rogelio:  2023    REASON FOR CONSULTATION:  To assist with antibiotic treatment for  perinephric abscess and recent blister due to HSV. HISTORY OF PRESENT ILLNESS:  She is a 53-year-old female patient with a  history of bipolar disorder, history of urinary tract infection  presented with abdominal pain. The patient was noted to have left  perinephric abscess, requiring drainage. The patient has history of  urinary retention, for that she had some form of lithotripsy, presented  with above complaints. She had a large perinephric abscess that  required drainage. She had severe pain, had a drain placed, draining  very thick purulent fluid. It has not been draining since last few  days. She denies any fever or chills. She is on acyclovir, IV Zosyn to  treat recent blister on her labia and the perinephric abscess. She has  no nausea or vomiting. PAST MEDICAL HISTORY:  Significant for history of acute kidney injury,  allergic rhinitis, osteoarthritis, bipolar disorder, history of polyps  in colon, cataract, COPD, depression, hearing loss, history of stroke,  kidney stones, history of PE, hyperlipidemia, hypertension, fatty liver,  history of alcohol use. PAST SURGICAL HISTORY:  Abdominal surgery, cardiac surgery, carpal  tunnel release, cholecystectomy, cystoscopy with biopsy of liver,  hysterectomy, mandibular fracture surgery, rotator cuff repair, shoulder  surgery, skin biopsies. ALLERGIES:  NO KNOWN DRUG ALLERGIES FROM [facility].     CURRENT MEDICATIONS:  Include acyclovir 400 t.i.d.,
Every Day     Packs/day: 1.00     Years: 46.00     Additional pack years: 0.00     Total pack years: 46.00     Types: Cigarettes     Start date: 4/22/1977    Smokeless tobacco: Never    Tobacco comments:     Trying to quit   Vaping Use    Vaping Use: Never used   Substance and Sexual Activity    Alcohol use: No     Comment: none for 2 years    Drug use: Not Currently     Frequency: 1.0 times per week     Types: Cocaine    Sexual activity: Not Currently   Other Topics Concern    Not on file   Social History Narrative    Not on file     Social Determinants of Health     Financial Resource Strain: Not on file   Food Insecurity: Not on file   Transportation Needs: Not on file   Physical Activity: Not on file   Stress: Not on file   Social Connections: Not on file   Intimate Partner Violence: Not on file   Housing Stability: Not on file     Family History:       Problem Relation Age of Onset    Cancer Mother     Heart Disease Mother     High Blood Pressure Mother     High Cholesterol Mother     Cancer Father         brain    Depression Father     Heart Disease Father     High Cholesterol Father     Mental Illness Father     Cancer Sister     Heart Attack Sister     Heart Disease Maternal Uncle     High Cholesterol Maternal Uncle     Diabetes Maternal Grandmother     Heart Disease Maternal Grandmother     High Cholesterol Maternal Grandmother     Early Death Maternal Grandfather     Heart Disease Maternal Grandfather     High Cholesterol Maternal Grandfather     Stroke Maternal Grandfather     Heart Disease Paternal Grandmother     High Cholesterol Paternal Grandmother     Heart Disease Paternal Grandfather     High Cholesterol Paternal Grandfather     Colon Cancer Neg Hx     Inflam Bowel Dis Neg Hx        ROS:  Constitutional: Negative for chills, fatigue, fever, or weight loss. Eyes: Denies reported visual changes. ENT: Denies headache, difficulty swallowing, earache, and nosebleeds.   Cardiovascular: Negative for

## 2023-11-04 LAB
DEPRECATED RDW RBC AUTO: 48.2 FL (ref 35–45)
ERYTHROCYTE [DISTWIDTH] IN BLOOD BY AUTOMATED COUNT: 15.8 % (ref 11.5–14.5)
HCT VFR BLD AUTO: 30.2 % (ref 37–47)
HGB BLD-MCNC: 8.8 GM/DL (ref 12–16)
MCH RBC QN AUTO: 24.3 PG (ref 26–33)
MCHC RBC AUTO-ENTMCNC: 29.1 GM/DL (ref 32.2–35.5)
MCV RBC AUTO: 83.4 FL (ref 81–99)
PLATELET # BLD AUTO: 476 THOU/MM3 (ref 130–400)
PMV BLD AUTO: 8 FL (ref 9.4–12.4)
PYRIDOXAL PHOS SERPL-SCNC: 6.5 NMOL/L (ref 20–125)
RBC # BLD AUTO: 3.62 MILL/MM3 (ref 4.2–5.4)
WBC # BLD AUTO: 6.3 THOU/MM3 (ref 4.8–10.8)

## 2023-11-04 PROCEDURE — 6370000000 HC RX 637 (ALT 250 FOR IP): Performed by: INTERNAL MEDICINE

## 2023-11-04 PROCEDURE — 99232 SBSQ HOSP IP/OBS MODERATE 35: CPT | Performed by: STUDENT IN AN ORGANIZED HEALTH CARE EDUCATION/TRAINING PROGRAM

## 2023-11-04 PROCEDURE — 6370000000 HC RX 637 (ALT 250 FOR IP): Performed by: STUDENT IN AN ORGANIZED HEALTH CARE EDUCATION/TRAINING PROGRAM

## 2023-11-04 PROCEDURE — 6360000002 HC RX W HCPCS: Performed by: INTERNAL MEDICINE

## 2023-11-04 PROCEDURE — 1200000000 HC SEMI PRIVATE

## 2023-11-04 PROCEDURE — 6360000002 HC RX W HCPCS: Performed by: STUDENT IN AN ORGANIZED HEALTH CARE EDUCATION/TRAINING PROGRAM

## 2023-11-04 PROCEDURE — 2580000003 HC RX 258: Performed by: INTERNAL MEDICINE

## 2023-11-04 PROCEDURE — 85027 COMPLETE CBC AUTOMATED: CPT

## 2023-11-04 PROCEDURE — 94640 AIRWAY INHALATION TREATMENT: CPT

## 2023-11-04 PROCEDURE — 94761 N-INVAS EAR/PLS OXIMETRY MLT: CPT

## 2023-11-04 PROCEDURE — 6370000000 HC RX 637 (ALT 250 FOR IP)

## 2023-11-04 PROCEDURE — 36415 COLL VENOUS BLD VENIPUNCTURE: CPT

## 2023-11-04 RX ORDER — ONDANSETRON 4 MG/1
4 TABLET, ORALLY DISINTEGRATING ORAL EVERY 8 HOURS PRN
Status: DISCONTINUED | OUTPATIENT
Start: 2023-11-04 | End: 2023-11-13 | Stop reason: HOSPADM

## 2023-11-04 RX ORDER — ONDANSETRON 2 MG/ML
4 INJECTION INTRAMUSCULAR; INTRAVENOUS EVERY 6 HOURS PRN
Status: DISCONTINUED | OUTPATIENT
Start: 2023-11-04 | End: 2023-11-13 | Stop reason: HOSPADM

## 2023-11-04 RX ORDER — LANOLIN ALCOHOL/MO/W.PET/CERES
50 CREAM (GRAM) TOPICAL DAILY
Status: DISCONTINUED | OUTPATIENT
Start: 2023-11-04 | End: 2023-11-13 | Stop reason: HOSPADM

## 2023-11-04 RX ADMIN — FOLIC ACID 1 MG: 1 TABLET ORAL at 09:38

## 2023-11-04 RX ADMIN — ASPIRIN 81 MG: 81 TABLET, COATED ORAL at 09:38

## 2023-11-04 RX ADMIN — PANTOPRAZOLE SODIUM 40 MG: 40 TABLET, DELAYED RELEASE ORAL at 05:03

## 2023-11-04 RX ADMIN — FERROUS SULFATE TAB 325 MG (65 MG ELEMENTAL FE) 325 MG: 325 (65 FE) TAB at 20:12

## 2023-11-04 RX ADMIN — ESCITALOPRAM OXALATE 20 MG: 20 TABLET ORAL at 09:38

## 2023-11-04 RX ADMIN — PIPERACILLIN AND TAZOBACTAM 3375 MG: 3; .375 INJECTION, POWDER, LYOPHILIZED, FOR SOLUTION INTRAVENOUS at 05:05

## 2023-11-04 RX ADMIN — CARVEDILOL 3.12 MG: 3.12 TABLET, FILM COATED ORAL at 17:54

## 2023-11-04 RX ADMIN — SUCRALFATE 1 G: 1 TABLET ORAL at 20:12

## 2023-11-04 RX ADMIN — ENOXAPARIN SODIUM 40 MG: 100 INJECTION SUBCUTANEOUS at 09:37

## 2023-11-04 RX ADMIN — BUPROPION HYDROCHLORIDE 150 MG: 150 TABLET, FILM COATED, EXTENDED RELEASE ORAL at 09:38

## 2023-11-04 RX ADMIN — Medication 50 MG: at 15:30

## 2023-11-04 RX ADMIN — PIPERACILLIN AND TAZOBACTAM 3375 MG: 3; .375 INJECTION, POWDER, LYOPHILIZED, FOR SOLUTION INTRAVENOUS at 22:19

## 2023-11-04 RX ADMIN — HYDROCODONE BITARTRATE AND ACETAMINOPHEN 2 TABLET: 5; 325 TABLET ORAL at 17:55

## 2023-11-04 RX ADMIN — ATORVASTATIN CALCIUM 40 MG: 40 TABLET, FILM COATED ORAL at 20:12

## 2023-11-04 RX ADMIN — MOMETASONE FUROATE AND FORMOTEROL FUMARATE DIHYDRATE 2 PUFF: 200; 5 AEROSOL RESPIRATORY (INHALATION) at 18:35

## 2023-11-04 RX ADMIN — ARIPIPRAZOLE 2 MG: 2 TABLET ORAL at 09:38

## 2023-11-04 RX ADMIN — HYDROCODONE BITARTRATE AND ACETAMINOPHEN 2 TABLET: 5; 325 TABLET ORAL at 22:12

## 2023-11-04 RX ADMIN — CARVEDILOL 3.12 MG: 3.12 TABLET, FILM COATED ORAL at 09:37

## 2023-11-04 RX ADMIN — SUCRALFATE 1 G: 1 TABLET ORAL at 17:54

## 2023-11-04 RX ADMIN — LEVOTHYROXINE SODIUM 75 MCG: 0.07 TABLET ORAL at 05:03

## 2023-11-04 RX ADMIN — SUCRALFATE 1 G: 1 TABLET ORAL at 09:38

## 2023-11-04 RX ADMIN — MOMETASONE FUROATE AND FORMOTEROL FUMARATE DIHYDRATE 2 PUFF: 200; 5 AEROSOL RESPIRATORY (INHALATION) at 08:33

## 2023-11-04 RX ADMIN — ACYCLOVIR 400 MG: 200 CAPSULE ORAL at 15:30

## 2023-11-04 RX ADMIN — FENOFIBRATE 160 MG: 160 TABLET ORAL at 09:38

## 2023-11-04 RX ADMIN — PANTOPRAZOLE SODIUM 40 MG: 40 TABLET, DELAYED RELEASE ORAL at 15:30

## 2023-11-04 RX ADMIN — TRAZODONE HYDROCHLORIDE 200 MG: 100 TABLET ORAL at 20:12

## 2023-11-04 RX ADMIN — SUCRALFATE 1 G: 1 TABLET ORAL at 15:30

## 2023-11-04 RX ADMIN — PIPERACILLIN AND TAZOBACTAM 3375 MG: 3; .375 INJECTION, POWDER, LYOPHILIZED, FOR SOLUTION INTRAVENOUS at 15:30

## 2023-11-04 RX ADMIN — HYDROCODONE BITARTRATE AND ACETAMINOPHEN 2 TABLET: 5; 325 TABLET ORAL at 13:15

## 2023-11-04 RX ADMIN — ACYCLOVIR 400 MG: 200 CAPSULE ORAL at 20:12

## 2023-11-04 RX ADMIN — FERROUS SULFATE TAB 325 MG (65 MG ELEMENTAL FE) 325 MG: 325 (65 FE) TAB at 09:38

## 2023-11-04 RX ADMIN — QUETIAPINE FUMARATE 600 MG: 200 TABLET, EXTENDED RELEASE ORAL at 20:12

## 2023-11-04 RX ADMIN — ACYCLOVIR 400 MG: 200 CAPSULE ORAL at 09:37

## 2023-11-04 ASSESSMENT — PAIN SCALES - GENERAL
PAINLEVEL_OUTOF10: 8
PAINLEVEL_OUTOF10: 10
PAINLEVEL_OUTOF10: 0
PAINLEVEL_OUTOF10: 8
PAINLEVEL_OUTOF10: 0

## 2023-11-04 ASSESSMENT — PAIN DESCRIPTION - LOCATION
LOCATION: ABDOMEN

## 2023-11-04 ASSESSMENT — PAIN DESCRIPTION - DESCRIPTORS: DESCRIPTORS: ACHING;DISCOMFORT

## 2023-11-04 ASSESSMENT — PAIN DESCRIPTION - ORIENTATION: ORIENTATION: LEFT

## 2023-11-04 NOTE — FLOWSHEET NOTE
11/04/23 1136   Safe Environment   Safety Measures Call light within reach;Standard Safety Measures     VN completed safety rounds. Camera accessed for safety. Pt alert in bed. No s/s of any distress noted. Call bell and personal items within reach. No needs at this time.

## 2023-11-04 NOTE — FLOWSHEET NOTE
11/04/23 1613   Safe Environment   Safety Measures Bed in low position;Call light within reach; Fall prevention (comment); Side rails X 2;Standard Safety Measures  (Virtual nurse safety round completed.)     Patient is awake and  alert and answers questions. No distress noted. Inquired if patient had needs for addressing pain, potty, positioning, access to personal things and any other personal needs. No needs at this time. Referred patient to page 13 of the handbook for fall prevention review. Educated on call light use. Patient voices understanding on using the call light. Call light in reach.

## 2023-11-05 LAB
DEPRECATED RDW RBC AUTO: 48.7 FL (ref 35–45)
ERYTHROCYTE [DISTWIDTH] IN BLOOD BY AUTOMATED COUNT: 15.9 % (ref 11.5–14.5)
HCT VFR BLD AUTO: 31.9 % (ref 37–47)
HGB BLD-MCNC: 9.4 GM/DL (ref 12–16)
MCH RBC QN AUTO: 25.1 PG (ref 26–33)
MCHC RBC AUTO-ENTMCNC: 29.5 GM/DL (ref 32.2–35.5)
MCV RBC AUTO: 85.1 FL (ref 81–99)
PLATELET # BLD AUTO: 438 THOU/MM3 (ref 130–400)
PMV BLD AUTO: 7.9 FL (ref 9.4–12.4)
RBC # BLD AUTO: 3.75 MILL/MM3 (ref 4.2–5.4)
WBC # BLD AUTO: 6.3 THOU/MM3 (ref 4.8–10.8)

## 2023-11-05 PROCEDURE — 6360000002 HC RX W HCPCS: Performed by: STUDENT IN AN ORGANIZED HEALTH CARE EDUCATION/TRAINING PROGRAM

## 2023-11-05 PROCEDURE — 6370000000 HC RX 637 (ALT 250 FOR IP): Performed by: INTERNAL MEDICINE

## 2023-11-05 PROCEDURE — 94640 AIRWAY INHALATION TREATMENT: CPT

## 2023-11-05 PROCEDURE — 36415 COLL VENOUS BLD VENIPUNCTURE: CPT

## 2023-11-05 PROCEDURE — 85027 COMPLETE CBC AUTOMATED: CPT

## 2023-11-05 PROCEDURE — 6370000000 HC RX 637 (ALT 250 FOR IP)

## 2023-11-05 PROCEDURE — 6370000000 HC RX 637 (ALT 250 FOR IP): Performed by: STUDENT IN AN ORGANIZED HEALTH CARE EDUCATION/TRAINING PROGRAM

## 2023-11-05 PROCEDURE — 2580000003 HC RX 258: Performed by: INTERNAL MEDICINE

## 2023-11-05 PROCEDURE — 94761 N-INVAS EAR/PLS OXIMETRY MLT: CPT

## 2023-11-05 PROCEDURE — 99232 SBSQ HOSP IP/OBS MODERATE 35: CPT | Performed by: STUDENT IN AN ORGANIZED HEALTH CARE EDUCATION/TRAINING PROGRAM

## 2023-11-05 PROCEDURE — 1200000000 HC SEMI PRIVATE

## 2023-11-05 PROCEDURE — 2580000003 HC RX 258: Performed by: STUDENT IN AN ORGANIZED HEALTH CARE EDUCATION/TRAINING PROGRAM

## 2023-11-05 PROCEDURE — 6360000002 HC RX W HCPCS: Performed by: INTERNAL MEDICINE

## 2023-11-05 RX ADMIN — QUETIAPINE FUMARATE 600 MG: 200 TABLET, EXTENDED RELEASE ORAL at 20:46

## 2023-11-05 RX ADMIN — ASPIRIN 81 MG: 81 TABLET, COATED ORAL at 09:33

## 2023-11-05 RX ADMIN — Medication 50 MG: at 09:34

## 2023-11-05 RX ADMIN — HYDROCODONE BITARTRATE AND ACETAMINOPHEN 2 TABLET: 5; 325 TABLET ORAL at 05:47

## 2023-11-05 RX ADMIN — FOLIC ACID 1 MG: 1 TABLET ORAL at 09:33

## 2023-11-05 RX ADMIN — BUPROPION HYDROCHLORIDE 150 MG: 150 TABLET, FILM COATED, EXTENDED RELEASE ORAL at 09:33

## 2023-11-05 RX ADMIN — FENOFIBRATE 160 MG: 160 TABLET ORAL at 09:33

## 2023-11-05 RX ADMIN — SODIUM CHLORIDE: 9 INJECTION, SOLUTION INTRAVENOUS at 14:56

## 2023-11-05 RX ADMIN — ESCITALOPRAM OXALATE 20 MG: 20 TABLET ORAL at 09:34

## 2023-11-05 RX ADMIN — ENOXAPARIN SODIUM 40 MG: 100 INJECTION SUBCUTANEOUS at 09:33

## 2023-11-05 RX ADMIN — ARIPIPRAZOLE 2 MG: 2 TABLET ORAL at 09:33

## 2023-11-05 RX ADMIN — MOMETASONE FUROATE AND FORMOTEROL FUMARATE DIHYDRATE 2 PUFF: 200; 5 AEROSOL RESPIRATORY (INHALATION) at 17:25

## 2023-11-05 RX ADMIN — SODIUM CHLORIDE, PRESERVATIVE FREE 10 ML: 5 INJECTION INTRAVENOUS at 09:33

## 2023-11-05 RX ADMIN — SUCRALFATE 1 G: 1 TABLET ORAL at 09:33

## 2023-11-05 RX ADMIN — CARVEDILOL 3.12 MG: 3.12 TABLET, FILM COATED ORAL at 09:33

## 2023-11-05 RX ADMIN — ATORVASTATIN CALCIUM 40 MG: 40 TABLET, FILM COATED ORAL at 20:47

## 2023-11-05 RX ADMIN — PANTOPRAZOLE SODIUM 40 MG: 40 TABLET, DELAYED RELEASE ORAL at 05:35

## 2023-11-05 RX ADMIN — ACYCLOVIR 400 MG: 200 CAPSULE ORAL at 13:36

## 2023-11-05 RX ADMIN — SODIUM CHLORIDE, PRESERVATIVE FREE 10 ML: 5 INJECTION INTRAVENOUS at 20:47

## 2023-11-05 RX ADMIN — FERROUS SULFATE TAB 325 MG (65 MG ELEMENTAL FE) 325 MG: 325 (65 FE) TAB at 20:47

## 2023-11-05 RX ADMIN — HYDROCODONE BITARTRATE AND ACETAMINOPHEN 2 TABLET: 5; 325 TABLET ORAL at 20:52

## 2023-11-05 RX ADMIN — PIPERACILLIN AND TAZOBACTAM 3375 MG: 3; .375 INJECTION, POWDER, LYOPHILIZED, FOR SOLUTION INTRAVENOUS at 05:40

## 2023-11-05 RX ADMIN — PIPERACILLIN AND TAZOBACTAM 3375 MG: 3; .375 INJECTION, POWDER, LYOPHILIZED, FOR SOLUTION INTRAVENOUS at 14:58

## 2023-11-05 RX ADMIN — LEVOTHYROXINE SODIUM 150 MCG: 0.15 TABLET ORAL at 05:35

## 2023-11-05 RX ADMIN — MOMETASONE FUROATE AND FORMOTEROL FUMARATE DIHYDRATE 2 PUFF: 200; 5 AEROSOL RESPIRATORY (INHALATION) at 08:52

## 2023-11-05 RX ADMIN — SUCRALFATE 1 G: 1 TABLET ORAL at 17:11

## 2023-11-05 RX ADMIN — HYDROCODONE BITARTRATE AND ACETAMINOPHEN 2 TABLET: 5; 325 TABLET ORAL at 13:44

## 2023-11-05 RX ADMIN — SUCRALFATE 1 G: 1 TABLET ORAL at 20:46

## 2023-11-05 RX ADMIN — PANTOPRAZOLE SODIUM 40 MG: 40 TABLET, DELAYED RELEASE ORAL at 17:11

## 2023-11-05 RX ADMIN — ACYCLOVIR 400 MG: 200 CAPSULE ORAL at 09:34

## 2023-11-05 RX ADMIN — FERROUS SULFATE TAB 325 MG (65 MG ELEMENTAL FE) 325 MG: 325 (65 FE) TAB at 09:33

## 2023-11-05 RX ADMIN — SUCRALFATE 1 G: 1 TABLET ORAL at 13:36

## 2023-11-05 RX ADMIN — ACYCLOVIR 400 MG: 200 CAPSULE ORAL at 20:47

## 2023-11-05 ASSESSMENT — PAIN SCALES - GENERAL
PAINLEVEL_OUTOF10: 7
PAINLEVEL_OUTOF10: 9
PAINLEVEL_OUTOF10: 7
PAINLEVEL_OUTOF10: 9

## 2023-11-05 ASSESSMENT — PAIN SCALES - WONG BAKER: WONGBAKER_NUMERICALRESPONSE: 0

## 2023-11-05 ASSESSMENT — PAIN DESCRIPTION - PAIN TYPE: TYPE: ACUTE PAIN

## 2023-11-05 ASSESSMENT — PAIN DESCRIPTION - LOCATION
LOCATION: ABDOMEN

## 2023-11-05 ASSESSMENT — PAIN DESCRIPTION - DESCRIPTORS
DESCRIPTORS: DISCOMFORT
DESCRIPTORS: BURNING

## 2023-11-05 ASSESSMENT — PAIN DESCRIPTION - FREQUENCY: FREQUENCY: INTERMITTENT

## 2023-11-05 ASSESSMENT — PAIN DESCRIPTION - ORIENTATION
ORIENTATION: LOWER
ORIENTATION: LEFT
ORIENTATION: LEFT

## 2023-11-06 ENCOUNTER — TELEPHONE (OUTPATIENT)
Dept: UROLOGY | Age: 68
End: 2023-11-06

## 2023-11-06 PROBLEM — E53.1 VITAMIN B6 DEFICIENCY (NON ANEMIC): Status: ACTIVE | Noted: 2023-11-06

## 2023-11-06 LAB
DEPRECATED RDW RBC AUTO: 49.2 FL (ref 35–45)
ERYTHROCYTE [DISTWIDTH] IN BLOOD BY AUTOMATED COUNT: 16.1 % (ref 11.5–14.5)
HCT VFR BLD AUTO: 30.5 % (ref 37–47)
HGB BLD-MCNC: 8.8 GM/DL (ref 12–16)
MCH RBC QN AUTO: 24.6 PG (ref 26–33)
MCHC RBC AUTO-ENTMCNC: 28.9 GM/DL (ref 32.2–35.5)
MCV RBC AUTO: 85.2 FL (ref 81–99)
PLATELET # BLD AUTO: 430 THOU/MM3 (ref 130–400)
PMV BLD AUTO: 8 FL (ref 9.4–12.4)
RBC # BLD AUTO: 3.58 MILL/MM3 (ref 4.2–5.4)
SCAN OF BLOOD SMEAR: NORMAL
WBC # BLD AUTO: 5.8 THOU/MM3 (ref 4.8–10.8)

## 2023-11-06 PROCEDURE — 6370000000 HC RX 637 (ALT 250 FOR IP)

## 2023-11-06 PROCEDURE — 1200000000 HC SEMI PRIVATE

## 2023-11-06 PROCEDURE — 99231 SBSQ HOSP IP/OBS SF/LOW 25: CPT | Performed by: UROLOGY

## 2023-11-06 PROCEDURE — 2580000003 HC RX 258: Performed by: STUDENT IN AN ORGANIZED HEALTH CARE EDUCATION/TRAINING PROGRAM

## 2023-11-06 PROCEDURE — 6370000000 HC RX 637 (ALT 250 FOR IP): Performed by: STUDENT IN AN ORGANIZED HEALTH CARE EDUCATION/TRAINING PROGRAM

## 2023-11-06 PROCEDURE — 6360000002 HC RX W HCPCS: Performed by: STUDENT IN AN ORGANIZED HEALTH CARE EDUCATION/TRAINING PROGRAM

## 2023-11-06 PROCEDURE — 99232 SBSQ HOSP IP/OBS MODERATE 35: CPT | Performed by: STUDENT IN AN ORGANIZED HEALTH CARE EDUCATION/TRAINING PROGRAM

## 2023-11-06 PROCEDURE — 94640 AIRWAY INHALATION TREATMENT: CPT

## 2023-11-06 PROCEDURE — 2580000003 HC RX 258: Performed by: INTERNAL MEDICINE

## 2023-11-06 PROCEDURE — 85027 COMPLETE CBC AUTOMATED: CPT

## 2023-11-06 PROCEDURE — 6370000000 HC RX 637 (ALT 250 FOR IP): Performed by: INTERNAL MEDICINE

## 2023-11-06 PROCEDURE — 36415 COLL VENOUS BLD VENIPUNCTURE: CPT

## 2023-11-06 RX ORDER — AMOXICILLIN AND CLAVULANATE POTASSIUM 875; 125 MG/1; MG/1
1 TABLET, FILM COATED ORAL EVERY 12 HOURS SCHEDULED
Status: DISCONTINUED | OUTPATIENT
Start: 2023-11-06 | End: 2023-11-13 | Stop reason: HOSPADM

## 2023-11-06 RX ADMIN — PIPERACILLIN AND TAZOBACTAM 3375 MG: 3; .375 INJECTION, POWDER, LYOPHILIZED, FOR SOLUTION INTRAVENOUS at 07:24

## 2023-11-06 RX ADMIN — ACYCLOVIR 400 MG: 200 CAPSULE ORAL at 09:59

## 2023-11-06 RX ADMIN — FERROUS SULFATE TAB 325 MG (65 MG ELEMENTAL FE) 325 MG: 325 (65 FE) TAB at 10:00

## 2023-11-06 RX ADMIN — ARIPIPRAZOLE 2 MG: 2 TABLET ORAL at 09:59

## 2023-11-06 RX ADMIN — SODIUM CHLORIDE: 9 INJECTION, SOLUTION INTRAVENOUS at 04:22

## 2023-11-06 RX ADMIN — ACYCLOVIR 400 MG: 200 CAPSULE ORAL at 21:49

## 2023-11-06 RX ADMIN — HYDROCODONE BITARTRATE AND ACETAMINOPHEN 2 TABLET: 5; 325 TABLET ORAL at 16:05

## 2023-11-06 RX ADMIN — SUCRALFATE 1 G: 1 TABLET ORAL at 16:05

## 2023-11-06 RX ADMIN — SUCRALFATE 1 G: 1 TABLET ORAL at 09:59

## 2023-11-06 RX ADMIN — MOMETASONE FUROATE AND FORMOTEROL FUMARATE DIHYDRATE 2 PUFF: 200; 5 AEROSOL RESPIRATORY (INHALATION) at 17:12

## 2023-11-06 RX ADMIN — MOMETASONE FUROATE AND FORMOTEROL FUMARATE DIHYDRATE 2 PUFF: 200; 5 AEROSOL RESPIRATORY (INHALATION) at 07:59

## 2023-11-06 RX ADMIN — FENOFIBRATE 160 MG: 160 TABLET ORAL at 10:00

## 2023-11-06 RX ADMIN — BUPROPION HYDROCHLORIDE 150 MG: 150 TABLET, FILM COATED, EXTENDED RELEASE ORAL at 09:59

## 2023-11-06 RX ADMIN — HYDROCODONE BITARTRATE AND ACETAMINOPHEN 2 TABLET: 5; 325 TABLET ORAL at 04:11

## 2023-11-06 RX ADMIN — SUCRALFATE 1 G: 1 TABLET ORAL at 21:49

## 2023-11-06 RX ADMIN — Medication 50 MG: at 10:00

## 2023-11-06 RX ADMIN — HYDROCODONE BITARTRATE AND ACETAMINOPHEN 2 TABLET: 5; 325 TABLET ORAL at 21:54

## 2023-11-06 RX ADMIN — ATORVASTATIN CALCIUM 40 MG: 40 TABLET, FILM COATED ORAL at 21:49

## 2023-11-06 RX ADMIN — LEVOTHYROXINE SODIUM 75 MCG: 0.07 TABLET ORAL at 05:09

## 2023-11-06 RX ADMIN — HYDROCODONE BITARTRATE AND ACETAMINOPHEN 2 TABLET: 5; 325 TABLET ORAL at 10:00

## 2023-11-06 RX ADMIN — SODIUM CHLORIDE: 9 INJECTION, SOLUTION INTRAVENOUS at 19:04

## 2023-11-06 RX ADMIN — PIPERACILLIN AND TAZOBACTAM 3375 MG: 3; .375 INJECTION, POWDER, LYOPHILIZED, FOR SOLUTION INTRAVENOUS at 00:15

## 2023-11-06 RX ADMIN — FERROUS SULFATE TAB 325 MG (65 MG ELEMENTAL FE) 325 MG: 325 (65 FE) TAB at 21:49

## 2023-11-06 RX ADMIN — SUCRALFATE 1 G: 1 TABLET ORAL at 18:59

## 2023-11-06 RX ADMIN — AMOXICILLIN AND CLAVULANATE POTASSIUM 1 TABLET: 875; 125 TABLET, FILM COATED ORAL at 21:54

## 2023-11-06 RX ADMIN — PANTOPRAZOLE SODIUM 40 MG: 40 TABLET, DELAYED RELEASE ORAL at 05:09

## 2023-11-06 RX ADMIN — ENOXAPARIN SODIUM 40 MG: 100 INJECTION SUBCUTANEOUS at 09:59

## 2023-11-06 RX ADMIN — ASPIRIN 81 MG: 81 TABLET, COATED ORAL at 10:00

## 2023-11-06 RX ADMIN — FOLIC ACID 1 MG: 1 TABLET ORAL at 10:00

## 2023-11-06 RX ADMIN — CARVEDILOL 3.12 MG: 3.12 TABLET, FILM COATED ORAL at 16:06

## 2023-11-06 RX ADMIN — PANTOPRAZOLE SODIUM 40 MG: 40 TABLET, DELAYED RELEASE ORAL at 16:05

## 2023-11-06 RX ADMIN — ESCITALOPRAM OXALATE 20 MG: 20 TABLET ORAL at 09:59

## 2023-11-06 RX ADMIN — ACYCLOVIR 400 MG: 200 CAPSULE ORAL at 16:05

## 2023-11-06 RX ADMIN — QUETIAPINE FUMARATE 600 MG: 200 TABLET, EXTENDED RELEASE ORAL at 21:49

## 2023-11-06 RX ADMIN — CARVEDILOL 3.12 MG: 3.12 TABLET, FILM COATED ORAL at 10:00

## 2023-11-06 ASSESSMENT — PAIN - FUNCTIONAL ASSESSMENT
PAIN_FUNCTIONAL_ASSESSMENT: ACTIVITIES ARE NOT PREVENTED

## 2023-11-06 ASSESSMENT — PAIN DESCRIPTION - LOCATION
LOCATION: ABDOMEN
LOCATION: BACK

## 2023-11-06 ASSESSMENT — PAIN SCALES - GENERAL
PAINLEVEL_OUTOF10: 8
PAINLEVEL_OUTOF10: 7
PAINLEVEL_OUTOF10: 8
PAINLEVEL_OUTOF10: 9
PAINLEVEL_OUTOF10: 4

## 2023-11-06 ASSESSMENT — PAIN DESCRIPTION - ORIENTATION
ORIENTATION: LEFT
ORIENTATION: LEFT
ORIENTATION: MID
ORIENTATION: LEFT
ORIENTATION: LEFT

## 2023-11-06 ASSESSMENT — PAIN DESCRIPTION - FREQUENCY: FREQUENCY: INTERMITTENT

## 2023-11-06 ASSESSMENT — PAIN DESCRIPTION - ONSET: ONSET: ON-GOING

## 2023-11-06 ASSESSMENT — PAIN DESCRIPTION - DESCRIPTORS
DESCRIPTORS: ACHING;BURNING

## 2023-11-06 ASSESSMENT — PAIN DESCRIPTION - PAIN TYPE: TYPE: ACUTE PAIN

## 2023-11-06 NOTE — FLOWSHEET NOTE
11/06/23 1112   Safe Environment   Safety Measures Bed/Chair-Wheels locked;Call light within reach; Side rails X 2  (Virtual Nurse Safety Round Completed)     Call placed to patients room, patient did not respond to audio, video turned on for safety purposes. Patient is resting in bed with eyes closed, call light within reach, no signs or symtpoms of distress noted.

## 2023-11-06 NOTE — TELEPHONE ENCOUNTER
Consult for perinephric abscess  Has drain in place  Needs OV in 3-4 wks with Ct prior, can be with [facility]

## 2023-11-07 PROCEDURE — 6370000000 HC RX 637 (ALT 250 FOR IP): Performed by: INTERNAL MEDICINE

## 2023-11-07 PROCEDURE — 1200000000 HC SEMI PRIVATE

## 2023-11-07 PROCEDURE — 94761 N-INVAS EAR/PLS OXIMETRY MLT: CPT

## 2023-11-07 PROCEDURE — 94640 AIRWAY INHALATION TREATMENT: CPT

## 2023-11-07 PROCEDURE — 6370000000 HC RX 637 (ALT 250 FOR IP)

## 2023-11-07 PROCEDURE — 6370000000 HC RX 637 (ALT 250 FOR IP): Performed by: STUDENT IN AN ORGANIZED HEALTH CARE EDUCATION/TRAINING PROGRAM

## 2023-11-07 PROCEDURE — 6360000002 HC RX W HCPCS: Performed by: STUDENT IN AN ORGANIZED HEALTH CARE EDUCATION/TRAINING PROGRAM

## 2023-11-07 PROCEDURE — 2580000003 HC RX 258: Performed by: INTERNAL MEDICINE

## 2023-11-07 PROCEDURE — 99231 SBSQ HOSP IP/OBS SF/LOW 25: CPT | Performed by: STUDENT IN AN ORGANIZED HEALTH CARE EDUCATION/TRAINING PROGRAM

## 2023-11-07 RX ORDER — HYDROCODONE BITARTRATE AND ACETAMINOPHEN 5; 325 MG/1; MG/1
1 TABLET ORAL EVERY 4 HOURS PRN
Qty: 18 TABLET | Refills: 0 | Status: SHIPPED | OUTPATIENT
Start: 2023-11-07 | End: 2023-11-13 | Stop reason: SDUPTHER

## 2023-11-07 RX ORDER — ACYCLOVIR 200 MG/1
400 CAPSULE ORAL 3 TIMES DAILY
Qty: 12 CAPSULE | Refills: 0 | Status: SHIPPED | OUTPATIENT
Start: 2023-11-07 | End: 2023-11-09

## 2023-11-07 RX ORDER — PYRIDOXINE HCL (VITAMIN B6) 50 MG
50 TABLET ORAL DAILY
Qty: 30 TABLET | Refills: 3 | Status: SHIPPED | OUTPATIENT
Start: 2023-11-07

## 2023-11-07 RX ORDER — AMOXICILLIN AND CLAVULANATE POTASSIUM 875; 125 MG/1; MG/1
1 TABLET, FILM COATED ORAL EVERY 12 HOURS SCHEDULED
Qty: 41 TABLET | Refills: 0 | Status: SHIPPED | OUTPATIENT
Start: 2023-11-07 | End: 2023-11-28

## 2023-11-07 RX ADMIN — FERROUS SULFATE TAB 325 MG (65 MG ELEMENTAL FE) 325 MG: 325 (65 FE) TAB at 08:00

## 2023-11-07 RX ADMIN — MOMETASONE FUROATE AND FORMOTEROL FUMARATE DIHYDRATE 2 PUFF: 200; 5 AEROSOL RESPIRATORY (INHALATION) at 18:14

## 2023-11-07 RX ADMIN — SUCRALFATE 1 G: 1 TABLET ORAL at 11:17

## 2023-11-07 RX ADMIN — SUCRALFATE 1 G: 1 TABLET ORAL at 21:11

## 2023-11-07 RX ADMIN — CARVEDILOL 3.12 MG: 3.12 TABLET, FILM COATED ORAL at 17:03

## 2023-11-07 RX ADMIN — MOMETASONE FUROATE AND FORMOTEROL FUMARATE DIHYDRATE 2 PUFF: 200; 5 AEROSOL RESPIRATORY (INHALATION) at 08:41

## 2023-11-07 RX ADMIN — ENOXAPARIN SODIUM 40 MG: 100 INJECTION SUBCUTANEOUS at 08:00

## 2023-11-07 RX ADMIN — Medication 50 MG: at 08:00

## 2023-11-07 RX ADMIN — BUPROPION HYDROCHLORIDE 150 MG: 150 TABLET, FILM COATED, EXTENDED RELEASE ORAL at 08:00

## 2023-11-07 RX ADMIN — ASPIRIN 81 MG: 81 TABLET, COATED ORAL at 08:00

## 2023-11-07 RX ADMIN — AMOXICILLIN AND CLAVULANATE POTASSIUM 1 TABLET: 875; 125 TABLET, FILM COATED ORAL at 08:00

## 2023-11-07 RX ADMIN — SODIUM CHLORIDE, PRESERVATIVE FREE 10 ML: 5 INJECTION INTRAVENOUS at 21:11

## 2023-11-07 RX ADMIN — PANTOPRAZOLE SODIUM 40 MG: 40 TABLET, DELAYED RELEASE ORAL at 05:13

## 2023-11-07 RX ADMIN — ACYCLOVIR 400 MG: 200 CAPSULE ORAL at 13:52

## 2023-11-07 RX ADMIN — ONDANSETRON 4 MG: 4 TABLET, ORALLY DISINTEGRATING ORAL at 15:17

## 2023-11-07 RX ADMIN — ATORVASTATIN CALCIUM 40 MG: 40 TABLET, FILM COATED ORAL at 21:11

## 2023-11-07 RX ADMIN — PANTOPRAZOLE SODIUM 40 MG: 40 TABLET, DELAYED RELEASE ORAL at 15:18

## 2023-11-07 RX ADMIN — ACYCLOVIR 400 MG: 200 CAPSULE ORAL at 21:11

## 2023-11-07 RX ADMIN — AMOXICILLIN AND CLAVULANATE POTASSIUM 1 TABLET: 875; 125 TABLET, FILM COATED ORAL at 21:11

## 2023-11-07 RX ADMIN — LEVOTHYROXINE SODIUM 75 MCG: 0.07 TABLET ORAL at 05:09

## 2023-11-07 RX ADMIN — SUCRALFATE 1 G: 1 TABLET ORAL at 08:00

## 2023-11-07 RX ADMIN — SODIUM CHLORIDE, PRESERVATIVE FREE 10 ML: 5 INJECTION INTRAVENOUS at 08:01

## 2023-11-07 RX ADMIN — ARIPIPRAZOLE 2 MG: 2 TABLET ORAL at 08:00

## 2023-11-07 RX ADMIN — HYDROCODONE BITARTRATE AND ACETAMINOPHEN 2 TABLET: 5; 325 TABLET ORAL at 05:09

## 2023-11-07 RX ADMIN — FERROUS SULFATE TAB 325 MG (65 MG ELEMENTAL FE) 325 MG: 325 (65 FE) TAB at 21:11

## 2023-11-07 RX ADMIN — ESCITALOPRAM OXALATE 20 MG: 20 TABLET ORAL at 08:00

## 2023-11-07 RX ADMIN — ACETAMINOPHEN 650 MG: 325 TABLET ORAL at 11:17

## 2023-11-07 RX ADMIN — ACYCLOVIR 400 MG: 200 CAPSULE ORAL at 08:00

## 2023-11-07 RX ADMIN — FOLIC ACID 1 MG: 1 TABLET ORAL at 08:00

## 2023-11-07 RX ADMIN — FENOFIBRATE 160 MG: 160 TABLET ORAL at 08:00

## 2023-11-07 RX ADMIN — HYDROCODONE BITARTRATE AND ACETAMINOPHEN 1 TABLET: 5; 325 TABLET ORAL at 15:18

## 2023-11-07 RX ADMIN — ONDANSETRON 4 MG: 2 INJECTION INTRAMUSCULAR; INTRAVENOUS at 23:47

## 2023-11-07 RX ADMIN — QUETIAPINE FUMARATE 600 MG: 200 TABLET, EXTENDED RELEASE ORAL at 21:11

## 2023-11-07 RX ADMIN — SUCRALFATE 1 G: 1 TABLET ORAL at 15:18

## 2023-11-07 ASSESSMENT — PAIN DESCRIPTION - ORIENTATION
ORIENTATION: LEFT

## 2023-11-07 ASSESSMENT — PAIN SCALES - GENERAL
PAINLEVEL_OUTOF10: 7
PAINLEVEL_OUTOF10: 8
PAINLEVEL_OUTOF10: 7

## 2023-11-07 ASSESSMENT — PAIN DESCRIPTION - LOCATION
LOCATION: ABDOMEN
LOCATION: BACK;FLANK
LOCATION: ABDOMEN
LOCATION: BACK;FLANK
LOCATION: BACK;FLANK

## 2023-11-07 ASSESSMENT — PAIN DESCRIPTION - DESCRIPTORS
DESCRIPTORS: ACHING
DESCRIPTORS: ACHING;BURNING
DESCRIPTORS: SORE;ACHING
DESCRIPTORS: SORE
DESCRIPTORS: ACHING;BURNING

## 2023-11-07 NOTE — FLOWSHEET NOTE
11/07/23 1105   Safe Environment   Safety Measures Call light within reach; Other (comment)  (Virtual Nurse Safety Round Completed)     Call placed to patients room, patient responds to audio, permitted video. Patient alert and oriented. Patient denied any concerns/complaints at this time. Patient educated on utilizing call light. Call light within reach, no signs or symptoms of distress noted.

## 2023-11-08 PROCEDURE — 6370000000 HC RX 637 (ALT 250 FOR IP): Performed by: STUDENT IN AN ORGANIZED HEALTH CARE EDUCATION/TRAINING PROGRAM

## 2023-11-08 PROCEDURE — 6360000002 HC RX W HCPCS: Performed by: STUDENT IN AN ORGANIZED HEALTH CARE EDUCATION/TRAINING PROGRAM

## 2023-11-08 PROCEDURE — 94640 AIRWAY INHALATION TREATMENT: CPT

## 2023-11-08 PROCEDURE — 99232 SBSQ HOSP IP/OBS MODERATE 35: CPT | Performed by: PHYSICIAN ASSISTANT

## 2023-11-08 PROCEDURE — 1200000000 HC SEMI PRIVATE

## 2023-11-08 PROCEDURE — 2580000003 HC RX 258: Performed by: INTERNAL MEDICINE

## 2023-11-08 PROCEDURE — 6370000000 HC RX 637 (ALT 250 FOR IP): Performed by: INTERNAL MEDICINE

## 2023-11-08 PROCEDURE — 94761 N-INVAS EAR/PLS OXIMETRY MLT: CPT

## 2023-11-08 PROCEDURE — 6370000000 HC RX 637 (ALT 250 FOR IP)

## 2023-11-08 RX ADMIN — AMOXICILLIN AND CLAVULANATE POTASSIUM 1 TABLET: 875; 125 TABLET, FILM COATED ORAL at 08:15

## 2023-11-08 RX ADMIN — SUCRALFATE 1 G: 1 TABLET ORAL at 15:07

## 2023-11-08 RX ADMIN — ATORVASTATIN CALCIUM 40 MG: 40 TABLET, FILM COATED ORAL at 21:49

## 2023-11-08 RX ADMIN — FOLIC ACID 1 MG: 1 TABLET ORAL at 08:14

## 2023-11-08 RX ADMIN — MOMETASONE FUROATE AND FORMOTEROL FUMARATE DIHYDRATE 2 PUFF: 200; 5 AEROSOL RESPIRATORY (INHALATION) at 07:48

## 2023-11-08 RX ADMIN — HYDROCODONE BITARTRATE AND ACETAMINOPHEN 1 TABLET: 5; 325 TABLET ORAL at 11:40

## 2023-11-08 RX ADMIN — CARVEDILOL 3.12 MG: 3.12 TABLET, FILM COATED ORAL at 18:05

## 2023-11-08 RX ADMIN — ASPIRIN 81 MG: 81 TABLET, COATED ORAL at 08:15

## 2023-11-08 RX ADMIN — FENOFIBRATE 160 MG: 160 TABLET ORAL at 08:14

## 2023-11-08 RX ADMIN — ACETAMINOPHEN 650 MG: 325 TABLET ORAL at 08:15

## 2023-11-08 RX ADMIN — MOMETASONE FUROATE AND FORMOTEROL FUMARATE DIHYDRATE 2 PUFF: 200; 5 AEROSOL RESPIRATORY (INHALATION) at 18:25

## 2023-11-08 RX ADMIN — PANTOPRAZOLE SODIUM 40 MG: 40 TABLET, DELAYED RELEASE ORAL at 05:41

## 2023-11-08 RX ADMIN — FERROUS SULFATE TAB 325 MG (65 MG ELEMENTAL FE) 325 MG: 325 (65 FE) TAB at 08:14

## 2023-11-08 RX ADMIN — Medication 50 MG: at 08:15

## 2023-11-08 RX ADMIN — ESCITALOPRAM OXALATE 20 MG: 20 TABLET ORAL at 08:14

## 2023-11-08 RX ADMIN — ENOXAPARIN SODIUM 40 MG: 100 INJECTION SUBCUTANEOUS at 08:15

## 2023-11-08 RX ADMIN — ONDANSETRON 4 MG: 2 INJECTION INTRAMUSCULAR; INTRAVENOUS at 20:39

## 2023-11-08 RX ADMIN — LEVOTHYROXINE SODIUM 75 MCG: 0.07 TABLET ORAL at 05:41

## 2023-11-08 RX ADMIN — ACYCLOVIR 400 MG: 200 CAPSULE ORAL at 08:15

## 2023-11-08 RX ADMIN — BUPROPION HYDROCHLORIDE 150 MG: 150 TABLET, FILM COATED, EXTENDED RELEASE ORAL at 08:14

## 2023-11-08 RX ADMIN — QUETIAPINE FUMARATE 600 MG: 200 TABLET, EXTENDED RELEASE ORAL at 21:49

## 2023-11-08 RX ADMIN — HYDROCODONE BITARTRATE AND ACETAMINOPHEN 1 TABLET: 5; 325 TABLET ORAL at 05:41

## 2023-11-08 RX ADMIN — CARVEDILOL 3.12 MG: 3.12 TABLET, FILM COATED ORAL at 08:15

## 2023-11-08 RX ADMIN — FERROUS SULFATE TAB 325 MG (65 MG ELEMENTAL FE) 325 MG: 325 (65 FE) TAB at 21:49

## 2023-11-08 RX ADMIN — SODIUM CHLORIDE, PRESERVATIVE FREE 10 ML: 5 INJECTION INTRAVENOUS at 08:14

## 2023-11-08 RX ADMIN — PANTOPRAZOLE SODIUM 40 MG: 40 TABLET, DELAYED RELEASE ORAL at 15:07

## 2023-11-08 RX ADMIN — SUCRALFATE 1 G: 1 TABLET ORAL at 08:14

## 2023-11-08 RX ADMIN — SODIUM CHLORIDE, PRESERVATIVE FREE 10 ML: 5 INJECTION INTRAVENOUS at 21:49

## 2023-11-08 RX ADMIN — HYDROCODONE BITARTRATE AND ACETAMINOPHEN 2 TABLET: 5; 325 TABLET ORAL at 15:06

## 2023-11-08 RX ADMIN — HYDROCODONE BITARTRATE AND ACETAMINOPHEN 2 TABLET: 5; 325 TABLET ORAL at 19:03

## 2023-11-08 RX ADMIN — ARIPIPRAZOLE 2 MG: 2 TABLET ORAL at 08:19

## 2023-11-08 RX ADMIN — SUCRALFATE 1 G: 1 TABLET ORAL at 11:40

## 2023-11-08 RX ADMIN — AMOXICILLIN AND CLAVULANATE POTASSIUM 1 TABLET: 875; 125 TABLET, FILM COATED ORAL at 21:49

## 2023-11-08 RX ADMIN — SUCRALFATE 1 G: 1 TABLET ORAL at 21:50

## 2023-11-08 ASSESSMENT — PAIN SCALES - GENERAL
PAINLEVEL_OUTOF10: 9
PAINLEVEL_OUTOF10: 5
PAINLEVEL_OUTOF10: 8
PAINLEVEL_OUTOF10: 8
PAINLEVEL_OUTOF10: 9
PAINLEVEL_OUTOF10: 8
PAINLEVEL_OUTOF10: 3
PAINLEVEL_OUTOF10: 9
PAINLEVEL_OUTOF10: 3

## 2023-11-08 ASSESSMENT — PAIN DESCRIPTION - DESCRIPTORS
DESCRIPTORS: ACHING;SORE
DESCRIPTORS: ACHING;SORE;DISCOMFORT
DESCRIPTORS: ACHING
DESCRIPTORS: ACHING;SORE
DESCRIPTORS: ACHING;DISCOMFORT
DESCRIPTORS: ACHING;DISCOMFORT
DESCRIPTORS: ACHING;SORE;DISCOMFORT

## 2023-11-08 ASSESSMENT — PAIN DESCRIPTION - LOCATION
LOCATION: ABDOMEN;FLANK
LOCATION: ABDOMEN
LOCATION: ABDOMEN;FLANK
LOCATION: ABDOMEN

## 2023-11-08 ASSESSMENT — PAIN DESCRIPTION - ORIENTATION
ORIENTATION: LEFT
ORIENTATION: LEFT;LOWER
ORIENTATION: LEFT

## 2023-11-08 NOTE — FLOWSHEET NOTE
11/08/23 1354   Safe Environment   Safety Measures Bed in low position;Call light within reach; Side rails X 2  (Virtual nurse safety round completed)     Call placed to patients room, patient responds to audio, permitted video. Patient alert and oriented. Patient denied any concerns/complaints at this time. Patient educated on utilizing call light. Call light within reach, no signs or symptoms of distress noted.

## 2023-11-08 NOTE — FLOWSHEET NOTE
11/08/23 [facility]   Safe Environment   Safety Measures Bed in low position;Call light within reach; Side rails X 2  (Virtual Nurse Safety Round Complete)     Call placed to patients room, patient did not respond to audio, video turned on for safety purposes. Patient is resting in bed with eyes closed, call light within reach, no signs or symtpoms of distress noted.

## 2023-11-09 PROCEDURE — 99232 SBSQ HOSP IP/OBS MODERATE 35: CPT | Performed by: PHYSICIAN ASSISTANT

## 2023-11-09 PROCEDURE — 1200000000 HC SEMI PRIVATE

## 2023-11-09 PROCEDURE — 2580000003 HC RX 258: Performed by: INTERNAL MEDICINE

## 2023-11-09 PROCEDURE — 6370000000 HC RX 637 (ALT 250 FOR IP)

## 2023-11-09 PROCEDURE — 6370000000 HC RX 637 (ALT 250 FOR IP): Performed by: STUDENT IN AN ORGANIZED HEALTH CARE EDUCATION/TRAINING PROGRAM

## 2023-11-09 PROCEDURE — 94761 N-INVAS EAR/PLS OXIMETRY MLT: CPT

## 2023-11-09 PROCEDURE — 6360000002 HC RX W HCPCS: Performed by: STUDENT IN AN ORGANIZED HEALTH CARE EDUCATION/TRAINING PROGRAM

## 2023-11-09 PROCEDURE — 94640 AIRWAY INHALATION TREATMENT: CPT

## 2023-11-09 PROCEDURE — 6370000000 HC RX 637 (ALT 250 FOR IP): Performed by: INTERNAL MEDICINE

## 2023-11-09 RX ADMIN — MOMETASONE FUROATE AND FORMOTEROL FUMARATE DIHYDRATE 2 PUFF: 200; 5 AEROSOL RESPIRATORY (INHALATION) at 18:06

## 2023-11-09 RX ADMIN — HYDROCODONE BITARTRATE AND ACETAMINOPHEN 2 TABLET: 5; 325 TABLET ORAL at 17:48

## 2023-11-09 RX ADMIN — Medication 50 MG: at 08:24

## 2023-11-09 RX ADMIN — CARVEDILOL 3.12 MG: 3.12 TABLET, FILM COATED ORAL at 08:23

## 2023-11-09 RX ADMIN — PANTOPRAZOLE SODIUM 40 MG: 40 TABLET, DELAYED RELEASE ORAL at 06:24

## 2023-11-09 RX ADMIN — FERROUS SULFATE TAB 325 MG (65 MG ELEMENTAL FE) 325 MG: 325 (65 FE) TAB at 20:07

## 2023-11-09 RX ADMIN — FOLIC ACID 1 MG: 1 TABLET ORAL at 08:24

## 2023-11-09 RX ADMIN — MOMETASONE FUROATE AND FORMOTEROL FUMARATE DIHYDRATE 2 PUFF: 200; 5 AEROSOL RESPIRATORY (INHALATION) at 08:33

## 2023-11-09 RX ADMIN — ASPIRIN 81 MG: 81 TABLET, COATED ORAL at 08:21

## 2023-11-09 RX ADMIN — ATORVASTATIN CALCIUM 40 MG: 40 TABLET, FILM COATED ORAL at 20:07

## 2023-11-09 RX ADMIN — SUCRALFATE 1 G: 1 TABLET ORAL at 08:26

## 2023-11-09 RX ADMIN — SODIUM CHLORIDE, PRESERVATIVE FREE 10 ML: 5 INJECTION INTRAVENOUS at 20:07

## 2023-11-09 RX ADMIN — PANTOPRAZOLE SODIUM 40 MG: 40 TABLET, DELAYED RELEASE ORAL at 17:48

## 2023-11-09 RX ADMIN — HYDROCODONE BITARTRATE AND ACETAMINOPHEN 2 TABLET: 5; 325 TABLET ORAL at 12:57

## 2023-11-09 RX ADMIN — CARVEDILOL 3.12 MG: 3.12 TABLET, FILM COATED ORAL at 17:48

## 2023-11-09 RX ADMIN — SODIUM CHLORIDE, PRESERVATIVE FREE 10 ML: 5 INJECTION INTRAVENOUS at 08:25

## 2023-11-09 RX ADMIN — HYDROCODONE BITARTRATE AND ACETAMINOPHEN 2 TABLET: 5; 325 TABLET ORAL at 08:21

## 2023-11-09 RX ADMIN — FERROUS SULFATE TAB 325 MG (65 MG ELEMENTAL FE) 325 MG: 325 (65 FE) TAB at 08:24

## 2023-11-09 RX ADMIN — LEVOTHYROXINE SODIUM 75 MCG: 0.07 TABLET ORAL at 06:24

## 2023-11-09 RX ADMIN — AMOXICILLIN AND CLAVULANATE POTASSIUM 1 TABLET: 875; 125 TABLET, FILM COATED ORAL at 20:07

## 2023-11-09 RX ADMIN — QUETIAPINE FUMARATE 600 MG: 200 TABLET, EXTENDED RELEASE ORAL at 21:37

## 2023-11-09 RX ADMIN — FENOFIBRATE 160 MG: 160 TABLET ORAL at 08:24

## 2023-11-09 RX ADMIN — AMOXICILLIN AND CLAVULANATE POTASSIUM 1 TABLET: 875; 125 TABLET, FILM COATED ORAL at 08:24

## 2023-11-09 RX ADMIN — ESCITALOPRAM OXALATE 20 MG: 20 TABLET ORAL at 08:25

## 2023-11-09 RX ADMIN — ARIPIPRAZOLE 2 MG: 2 TABLET ORAL at 08:24

## 2023-11-09 RX ADMIN — SUCRALFATE 1 G: 1 TABLET ORAL at 12:57

## 2023-11-09 RX ADMIN — SUCRALFATE 1 G: 1 TABLET ORAL at 17:49

## 2023-11-09 RX ADMIN — ENOXAPARIN SODIUM 40 MG: 100 INJECTION SUBCUTANEOUS at 08:21

## 2023-11-09 RX ADMIN — SUCRALFATE 1 G: 1 TABLET ORAL at 20:07

## 2023-11-09 RX ADMIN — BUPROPION HYDROCHLORIDE 150 MG: 150 TABLET, FILM COATED, EXTENDED RELEASE ORAL at 08:24

## 2023-11-09 RX ADMIN — ONDANSETRON 4 MG: 4 TABLET, ORALLY DISINTEGRATING ORAL at 08:21

## 2023-11-09 ASSESSMENT — PAIN DESCRIPTION - ORIENTATION
ORIENTATION: LEFT

## 2023-11-09 ASSESSMENT — PAIN SCALES - GENERAL
PAINLEVEL_OUTOF10: 8
PAINLEVEL_OUTOF10: 8
PAINLEVEL_OUTOF10: 2
PAINLEVEL_OUTOF10: 1
PAINLEVEL_OUTOF10: 8

## 2023-11-09 ASSESSMENT — PAIN DESCRIPTION - LOCATION
LOCATION: FLANK
LOCATION: BACK
LOCATION: BACK

## 2023-11-09 ASSESSMENT — PAIN DESCRIPTION - DESCRIPTORS
DESCRIPTORS: ACHING
DESCRIPTORS: DISCOMFORT
DESCRIPTORS: ACHING

## 2023-11-09 ASSESSMENT — PAIN SCALES - WONG BAKER: WONGBAKER_NUMERICALRESPONSE: 0

## 2023-11-09 NOTE — FLOWSHEET NOTE
11/09/23 1114   Safe Environment   Safety Measures Call light within reach; Bed in low position;Side rails X 2  (Virtual Nurse Safety Rounds)     Call placed to patients room, patient responds to audio, permitted video. Patient alert and oriented. Patient denied any voiced concerns/complaints at this time. Patient educated on utilizing call light. Call light within reach, no signs or symptoms of distress noted.

## 2023-11-09 NOTE — DISCHARGE INSTR - COC
2014    right    SKIN BIOPSY  2006    under left eye    STIMULATOR SURGERY N/A 11/4/2020    STAGE 1 INTERSTIM PLACEMENT performed by Marina Melendez MD at [facility] 11/23/2020    PLACEMENT OF STAGE II INTERSTIM performed by Marina Melendez MD at [facility] N/A 4/14/2022    Removal of Interstim performed by Itz Triana MD at [address] [place] 1/12/2023    Cystoscopy Left Ureteroscopy Laser Lithotripsy Basket Retrieval of Stone Fragments possible Left Ureteral Stent performed by Itz Triana MD at [address] [place] [address]       Immunization History:   Immunization History   Administered Date(s) Administered    COVID-19, J&J, (age 18y+), IM, 0.5 mL 04/27/2021    Influenza Virus Vaccine 02/16/2012, 10/10/2012, 10/14/2013, 09/12/2014, 10/11/2015, 10/27/2016, 10/08/2018    Influenza, FLUARIX, FLULAVAL, Alfredia Rose (age 10 mo+) AND AFLURIA, (age 1 y+), PF, 0.5mL 10/27/2016, 10/08/2018    Influenza, FLUCELVAX, (age 10 mo+), MDCK, MDV, 0.5mL 10/01/2019    Pneumococcal, PPSV23, PNEUMOVAX 23, (age 2y+), SC/IM, 0.5mL 02/16/2012, 10/11/2015    TDaP, ADACEL (age 6y-58y), BOOSTRIX (age 10y+), IM, 0.5mL 06/24/2013, 04/22/2016, 06/11/2019       Active Problems:  Patient Active Problem List   Diagnosis Code    GERD (gastroesophageal reflux disease) K21.9    Colon polyps K63.5    Chest pain, atypical R07.89    Gastroenteritis K52.9    Pneumonia J18.9    Shoulder pain M25.519    History of pulmonary embolism Z86.711    COPD (chronic obstructive pulmonary disease) ([facility]) J44.9    Hypoglycemia E16.2    Anticoagulated on Coumadin Z79.01    Bipolar disorder ([facility]) F31.9    Cannabis abuse F12.10    Cocaine abuse ([address]) F14.10    Alcohol dependence in remission ([address]) F10.21    Cholecystitis with cholelithiasis K80.10    GI bleed K92.2    Erosive esophagitis K22.10    Hypokalemia E87.6    HTN (hypertension), benign I10    Bipolar 1 disorder ([address]) F31.9    Chronic pain G89.29    Hiatal hernia K44.9

## 2023-11-09 NOTE — FLOWSHEET NOTE
11/09/23 1728   Safe Environment   Safety Measures Call light within reach; Bed in low position;Standard Safety Measures     Call placed to patients room, patient responds to audio, permitted video. Patient alert and oriented. Patient denied any voiced concerns/complaints at this time. Patient educated on utilizing call light. Call light within reach, no signs or symtpoms of distress noted.

## 2023-11-10 PROCEDURE — 97116 GAIT TRAINING THERAPY: CPT

## 2023-11-10 PROCEDURE — 99232 SBSQ HOSP IP/OBS MODERATE 35: CPT | Performed by: PHYSICIAN ASSISTANT

## 2023-11-10 PROCEDURE — 97162 PT EVAL MOD COMPLEX 30 MIN: CPT

## 2023-11-10 PROCEDURE — 94640 AIRWAY INHALATION TREATMENT: CPT

## 2023-11-10 PROCEDURE — 6370000000 HC RX 637 (ALT 250 FOR IP): Performed by: INTERNAL MEDICINE

## 2023-11-10 PROCEDURE — 6370000000 HC RX 637 (ALT 250 FOR IP): Performed by: STUDENT IN AN ORGANIZED HEALTH CARE EDUCATION/TRAINING PROGRAM

## 2023-11-10 PROCEDURE — 6370000000 HC RX 637 (ALT 250 FOR IP)

## 2023-11-10 PROCEDURE — 1200000000 HC SEMI PRIVATE

## 2023-11-10 PROCEDURE — 97110 THERAPEUTIC EXERCISES: CPT

## 2023-11-10 PROCEDURE — 97535 SELF CARE MNGMENT TRAINING: CPT

## 2023-11-10 PROCEDURE — 2580000003 HC RX 258: Performed by: INTERNAL MEDICINE

## 2023-11-10 PROCEDURE — 6360000002 HC RX W HCPCS: Performed by: STUDENT IN AN ORGANIZED HEALTH CARE EDUCATION/TRAINING PROGRAM

## 2023-11-10 PROCEDURE — 97166 OT EVAL MOD COMPLEX 45 MIN: CPT

## 2023-11-10 RX ADMIN — SUCRALFATE 1 G: 1 TABLET ORAL at 15:34

## 2023-11-10 RX ADMIN — MOMETASONE FUROATE AND FORMOTEROL FUMARATE DIHYDRATE 2 PUFF: 200; 5 AEROSOL RESPIRATORY (INHALATION) at 17:15

## 2023-11-10 RX ADMIN — Medication 50 MG: at 08:54

## 2023-11-10 RX ADMIN — ESCITALOPRAM OXALATE 20 MG: 20 TABLET ORAL at 08:54

## 2023-11-10 RX ADMIN — HYDROCODONE BITARTRATE AND ACETAMINOPHEN 2 TABLET: 5; 325 TABLET ORAL at 19:49

## 2023-11-10 RX ADMIN — HYDROCODONE BITARTRATE AND ACETAMINOPHEN 2 TABLET: 5; 325 TABLET ORAL at 15:34

## 2023-11-10 RX ADMIN — FERROUS SULFATE TAB 325 MG (65 MG ELEMENTAL FE) 325 MG: 325 (65 FE) TAB at 21:28

## 2023-11-10 RX ADMIN — MOMETASONE FUROATE AND FORMOTEROL FUMARATE DIHYDRATE 2 PUFF: 200; 5 AEROSOL RESPIRATORY (INHALATION) at 07:38

## 2023-11-10 RX ADMIN — BUPROPION HYDROCHLORIDE 150 MG: 150 TABLET, FILM COATED, EXTENDED RELEASE ORAL at 08:54

## 2023-11-10 RX ADMIN — LEVOTHYROXINE SODIUM 75 MCG: 0.07 TABLET ORAL at 06:23

## 2023-11-10 RX ADMIN — FENOFIBRATE 160 MG: 160 TABLET ORAL at 08:54

## 2023-11-10 RX ADMIN — ARIPIPRAZOLE 2 MG: 2 TABLET ORAL at 08:54

## 2023-11-10 RX ADMIN — AMOXICILLIN AND CLAVULANATE POTASSIUM 1 TABLET: 875; 125 TABLET, FILM COATED ORAL at 21:28

## 2023-11-10 RX ADMIN — ENOXAPARIN SODIUM 40 MG: 100 INJECTION SUBCUTANEOUS at 08:54

## 2023-11-10 RX ADMIN — SUCRALFATE 1 G: 1 TABLET ORAL at 12:29

## 2023-11-10 RX ADMIN — HYDROCODONE BITARTRATE AND ACETAMINOPHEN 1 TABLET: 5; 325 TABLET ORAL at 06:27

## 2023-11-10 RX ADMIN — CARVEDILOL 3.12 MG: 3.12 TABLET, FILM COATED ORAL at 16:11

## 2023-11-10 RX ADMIN — FOLIC ACID 1 MG: 1 TABLET ORAL at 08:54

## 2023-11-10 RX ADMIN — CARVEDILOL 3.12 MG: 3.12 TABLET, FILM COATED ORAL at 08:54

## 2023-11-10 RX ADMIN — FERROUS SULFATE TAB 325 MG (65 MG ELEMENTAL FE) 325 MG: 325 (65 FE) TAB at 08:54

## 2023-11-10 RX ADMIN — SODIUM CHLORIDE, PRESERVATIVE FREE 10 ML: 5 INJECTION INTRAVENOUS at 21:29

## 2023-11-10 RX ADMIN — PANTOPRAZOLE SODIUM 40 MG: 40 TABLET, DELAYED RELEASE ORAL at 15:34

## 2023-11-10 RX ADMIN — QUETIAPINE FUMARATE 600 MG: 200 TABLET, EXTENDED RELEASE ORAL at 21:29

## 2023-11-10 RX ADMIN — SUCRALFATE 1 G: 1 TABLET ORAL at 21:29

## 2023-11-10 RX ADMIN — HYDROCODONE BITARTRATE AND ACETAMINOPHEN 2 TABLET: 5; 325 TABLET ORAL at 00:51

## 2023-11-10 RX ADMIN — PANTOPRAZOLE SODIUM 40 MG: 40 TABLET, DELAYED RELEASE ORAL at 06:22

## 2023-11-10 RX ADMIN — AMOXICILLIN AND CLAVULANATE POTASSIUM 1 TABLET: 875; 125 TABLET, FILM COATED ORAL at 08:54

## 2023-11-10 RX ADMIN — SUCRALFATE 1 G: 1 TABLET ORAL at 08:54

## 2023-11-10 RX ADMIN — ASPIRIN 81 MG: 81 TABLET, COATED ORAL at 08:54

## 2023-11-10 RX ADMIN — ATORVASTATIN CALCIUM 40 MG: 40 TABLET, FILM COATED ORAL at 21:28

## 2023-11-10 RX ADMIN — SODIUM CHLORIDE, PRESERVATIVE FREE 10 ML: 5 INJECTION INTRAVENOUS at 08:54

## 2023-11-10 ASSESSMENT — PAIN DESCRIPTION - LOCATION
LOCATION: BACK
LOCATION: HIP
LOCATION: BACK

## 2023-11-10 ASSESSMENT — PAIN SCALES - GENERAL
PAINLEVEL_OUTOF10: 9
PAINLEVEL_OUTOF10: 8
PAINLEVEL_OUTOF10: 5
PAINLEVEL_OUTOF10: 6
PAINLEVEL_OUTOF10: 4

## 2023-11-10 ASSESSMENT — PAIN DESCRIPTION - ORIENTATION
ORIENTATION: LOWER
ORIENTATION: LEFT
ORIENTATION: LOWER

## 2023-11-10 ASSESSMENT — PAIN DESCRIPTION - DESCRIPTORS
DESCRIPTORS: ACHING;SHARP;SPASM
DESCRIPTORS: ACHING;DISCOMFORT
DESCRIPTORS: ACHING;SPASM

## 2023-11-10 NOTE — FLOWSHEET NOTE
11/10/23 3598   Safe Environment   Safety Measures Other (comment)  (virtual safety round)     Pt did not respond to voice, camera turned on for safety. Pt not in bed at this time.

## 2023-11-11 LAB
ANION GAP SERPL CALC-SCNC: 10 MEQ/L (ref 8–16)
BUN SERPL-MCNC: 20 MG/DL (ref 7–22)
CALCIUM SERPL-MCNC: 10.4 MG/DL (ref 8.5–10.5)
CHLORIDE SERPL-SCNC: 103 MEQ/L (ref 98–111)
CO2 SERPL-SCNC: 25 MEQ/L (ref 23–33)
CREAT SERPL-MCNC: 0.7 MG/DL (ref 0.4–1.2)
DEPRECATED RDW RBC AUTO: 52.4 FL (ref 35–45)
ERYTHROCYTE [DISTWIDTH] IN BLOOD BY AUTOMATED COUNT: 18.8 % (ref 11.5–14.5)
GFR SERPL CREATININE-BSD FRML MDRD: > 60 ML/MIN/1.73M2
GLUCOSE SERPL-MCNC: 116 MG/DL (ref 70–108)
HCT VFR BLD AUTO: 36.5 % (ref 37–47)
HGB BLD-MCNC: 10.7 GM/DL (ref 12–16)
MCH RBC QN AUTO: 25.5 PG (ref 26–33)
MCHC RBC AUTO-ENTMCNC: 29.3 GM/DL (ref 32.2–35.5)
MCV RBC AUTO: 87.1 FL (ref 81–99)
PLATELET # BLD AUTO: 360 THOU/MM3 (ref 130–400)
PMV BLD AUTO: 8.4 FL (ref 9.4–12.4)
POTASSIUM SERPL-SCNC: 4.6 MEQ/L (ref 3.5–5.2)
RBC # BLD AUTO: 4.19 MILL/MM3 (ref 4.2–5.4)
SODIUM SERPL-SCNC: 138 MEQ/L (ref 135–145)
WBC # BLD AUTO: 7.5 THOU/MM3 (ref 4.8–10.8)

## 2023-11-11 PROCEDURE — 6370000000 HC RX 637 (ALT 250 FOR IP): Performed by: STUDENT IN AN ORGANIZED HEALTH CARE EDUCATION/TRAINING PROGRAM

## 2023-11-11 PROCEDURE — 6370000000 HC RX 637 (ALT 250 FOR IP)

## 2023-11-11 PROCEDURE — 99232 SBSQ HOSP IP/OBS MODERATE 35: CPT | Performed by: PHYSICIAN ASSISTANT

## 2023-11-11 PROCEDURE — 85027 COMPLETE CBC AUTOMATED: CPT

## 2023-11-11 PROCEDURE — 2580000003 HC RX 258: Performed by: INTERNAL MEDICINE

## 2023-11-11 PROCEDURE — 94640 AIRWAY INHALATION TREATMENT: CPT

## 2023-11-11 PROCEDURE — 6370000000 HC RX 637 (ALT 250 FOR IP): Performed by: INTERNAL MEDICINE

## 2023-11-11 PROCEDURE — 80048 BASIC METABOLIC PNL TOTAL CA: CPT

## 2023-11-11 PROCEDURE — 36415 COLL VENOUS BLD VENIPUNCTURE: CPT

## 2023-11-11 PROCEDURE — 6370000000 HC RX 637 (ALT 250 FOR IP): Performed by: PHYSICIAN ASSISTANT

## 2023-11-11 PROCEDURE — 1200000000 HC SEMI PRIVATE

## 2023-11-11 PROCEDURE — 6360000002 HC RX W HCPCS: Performed by: STUDENT IN AN ORGANIZED HEALTH CARE EDUCATION/TRAINING PROGRAM

## 2023-11-11 RX ADMIN — CARVEDILOL 3.12 MG: 3.12 TABLET, FILM COATED ORAL at 08:24

## 2023-11-11 RX ADMIN — HYDROCODONE BITARTRATE AND ACETAMINOPHEN 2 TABLET: 5; 325 TABLET ORAL at 23:24

## 2023-11-11 RX ADMIN — HYDROCODONE BITARTRATE AND ACETAMINOPHEN 2 TABLET: 5; 325 TABLET ORAL at 17:49

## 2023-11-11 RX ADMIN — PANTOPRAZOLE SODIUM 40 MG: 40 TABLET, DELAYED RELEASE ORAL at 06:29

## 2023-11-11 RX ADMIN — SUCRALFATE 1 G: 1 TABLET ORAL at 21:07

## 2023-11-11 RX ADMIN — FENOFIBRATE 160 MG: 160 TABLET ORAL at 08:24

## 2023-11-11 RX ADMIN — MOMETASONE FUROATE AND FORMOTEROL FUMARATE DIHYDRATE 2 PUFF: 200; 5 AEROSOL RESPIRATORY (INHALATION) at 17:27

## 2023-11-11 RX ADMIN — SODIUM CHLORIDE, PRESERVATIVE FREE 10 ML: 5 INJECTION INTRAVENOUS at 08:33

## 2023-11-11 RX ADMIN — QUETIAPINE FUMARATE 600 MG: 200 TABLET, EXTENDED RELEASE ORAL at 21:07

## 2023-11-11 RX ADMIN — ASPIRIN 81 MG: 81 TABLET, COATED ORAL at 08:24

## 2023-11-11 RX ADMIN — MOMETASONE FUROATE AND FORMOTEROL FUMARATE DIHYDRATE 2 PUFF: 200; 5 AEROSOL RESPIRATORY (INHALATION) at 07:56

## 2023-11-11 RX ADMIN — Medication 50 MG: at 08:24

## 2023-11-11 RX ADMIN — FERROUS SULFATE TAB 325 MG (65 MG ELEMENTAL FE) 325 MG: 325 (65 FE) TAB at 08:33

## 2023-11-11 RX ADMIN — CARVEDILOL 3.12 MG: 3.12 TABLET, FILM COATED ORAL at 17:59

## 2023-11-11 RX ADMIN — PANTOPRAZOLE SODIUM 40 MG: 40 TABLET, DELAYED RELEASE ORAL at 17:59

## 2023-11-11 RX ADMIN — FERROUS SULFATE TAB 325 MG (65 MG ELEMENTAL FE) 325 MG: 325 (65 FE) TAB at 21:06

## 2023-11-11 RX ADMIN — ARIPIPRAZOLE 2 MG: 2 TABLET ORAL at 08:24

## 2023-11-11 RX ADMIN — ESCITALOPRAM OXALATE 20 MG: 20 TABLET ORAL at 08:24

## 2023-11-11 RX ADMIN — SUCRALFATE 1 G: 1 TABLET ORAL at 14:46

## 2023-11-11 RX ADMIN — FOLIC ACID 1 MG: 1 TABLET ORAL at 08:24

## 2023-11-11 RX ADMIN — HYDROCODONE BITARTRATE AND ACETAMINOPHEN 2 TABLET: 5; 325 TABLET ORAL at 08:28

## 2023-11-11 RX ADMIN — SUCRALFATE 1 G: 1 TABLET ORAL at 08:24

## 2023-11-11 RX ADMIN — BUPROPION HYDROCHLORIDE 150 MG: 150 TABLET, FILM COATED, EXTENDED RELEASE ORAL at 08:24

## 2023-11-11 RX ADMIN — ATORVASTATIN CALCIUM 40 MG: 40 TABLET, FILM COATED ORAL at 21:06

## 2023-11-11 RX ADMIN — LEVOTHYROXINE SODIUM 75 MCG: 0.07 TABLET ORAL at 06:30

## 2023-11-11 RX ADMIN — AMOXICILLIN AND CLAVULANATE POTASSIUM 1 TABLET: 875; 125 TABLET, FILM COATED ORAL at 08:24

## 2023-11-11 RX ADMIN — ALUMINUM HYDROXIDE, MAGNESIUM HYDROXIDE, AND SIMETHICONE: 200; 200; 20 SUSPENSION ORAL at 17:58

## 2023-11-11 RX ADMIN — ENOXAPARIN SODIUM 40 MG: 100 INJECTION SUBCUTANEOUS at 08:25

## 2023-11-11 RX ADMIN — AMOXICILLIN AND CLAVULANATE POTASSIUM 1 TABLET: 875; 125 TABLET, FILM COATED ORAL at 21:06

## 2023-11-11 RX ADMIN — SODIUM CHLORIDE, PRESERVATIVE FREE 10 ML: 5 INJECTION INTRAVENOUS at 21:07

## 2023-11-11 RX ADMIN — SUCRALFATE 1 G: 1 TABLET ORAL at 17:59

## 2023-11-11 ASSESSMENT — PAIN DESCRIPTION - LOCATION
LOCATION: ABDOMEN

## 2023-11-11 ASSESSMENT — PAIN DESCRIPTION - PAIN TYPE
TYPE: ACUTE PAIN
TYPE: ACUTE PAIN

## 2023-11-11 ASSESSMENT — PAIN SCALES - GENERAL
PAINLEVEL_OUTOF10: 6
PAINLEVEL_OUTOF10: 9
PAINLEVEL_OUTOF10: 7
PAINLEVEL_OUTOF10: 9
PAINLEVEL_OUTOF10: 8

## 2023-11-11 ASSESSMENT — PAIN DESCRIPTION - DESCRIPTORS
DESCRIPTORS: ACHING
DESCRIPTORS: ACHING;THROBBING

## 2023-11-11 ASSESSMENT — PAIN DESCRIPTION - FREQUENCY
FREQUENCY: CONTINUOUS
FREQUENCY: CONTINUOUS

## 2023-11-11 ASSESSMENT — PAIN DESCRIPTION - ONSET
ONSET: ON-GOING
ONSET: ON-GOING

## 2023-11-11 ASSESSMENT — PAIN DESCRIPTION - ORIENTATION: ORIENTATION: LEFT

## 2023-11-11 NOTE — FLOWSHEET NOTE
11/11/23 1010   Safe Environment   Safety Measures Other (comment)  (Staff in the room.  no camera access granted.)

## 2023-11-11 NOTE — FLOWSHEET NOTE
11/11/23 3652   Safe Environment   Safety Measures Other (comment)  (Virtual Nurse Safety Round)     VN called into patients room and introduced myself and role. Patient answered and permitted video. Video activated. Patient resting comfortably in bed. Patient voiced no needs or concerns at this time. Pt denies pain. VN educated pt on utilizing call light if condition changes. Call light within reach.

## 2023-11-12 ENCOUNTER — APPOINTMENT (OUTPATIENT)
Dept: CT IMAGING | Age: 68
DRG: 690 | End: 2023-11-12
Payer: MEDICARE

## 2023-11-12 LAB — GLUCOSE BLD STRIP.AUTO-MCNC: 164 MG/DL (ref 70–108)

## 2023-11-12 PROCEDURE — 99233 SBSQ HOSP IP/OBS HIGH 50: CPT | Performed by: PHYSICIAN ASSISTANT

## 2023-11-12 PROCEDURE — 82948 REAGENT STRIP/BLOOD GLUCOSE: CPT

## 2023-11-12 PROCEDURE — 6370000000 HC RX 637 (ALT 250 FOR IP)

## 2023-11-12 PROCEDURE — 2580000003 HC RX 258: Performed by: INTERNAL MEDICINE

## 2023-11-12 PROCEDURE — 6370000000 HC RX 637 (ALT 250 FOR IP): Performed by: STUDENT IN AN ORGANIZED HEALTH CARE EDUCATION/TRAINING PROGRAM

## 2023-11-12 PROCEDURE — 94761 N-INVAS EAR/PLS OXIMETRY MLT: CPT

## 2023-11-12 PROCEDURE — 6360000002 HC RX W HCPCS: Performed by: STUDENT IN AN ORGANIZED HEALTH CARE EDUCATION/TRAINING PROGRAM

## 2023-11-12 PROCEDURE — 74177 CT ABD & PELVIS W/CONTRAST: CPT

## 2023-11-12 PROCEDURE — 6370000000 HC RX 637 (ALT 250 FOR IP): Performed by: INTERNAL MEDICINE

## 2023-11-12 PROCEDURE — 6360000004 HC RX CONTRAST MEDICATION: Performed by: INTERNAL MEDICINE

## 2023-11-12 PROCEDURE — 94640 AIRWAY INHALATION TREATMENT: CPT

## 2023-11-12 PROCEDURE — 1200000000 HC SEMI PRIVATE

## 2023-11-12 RX ADMIN — ARIPIPRAZOLE 2 MG: 2 TABLET ORAL at 09:41

## 2023-11-12 RX ADMIN — ASPIRIN 81 MG: 81 TABLET, COATED ORAL at 09:39

## 2023-11-12 RX ADMIN — LEVOTHYROXINE SODIUM 150 MCG: 0.15 TABLET ORAL at 06:07

## 2023-11-12 RX ADMIN — FENOFIBRATE 160 MG: 160 TABLET ORAL at 09:41

## 2023-11-12 RX ADMIN — PANTOPRAZOLE SODIUM 40 MG: 40 TABLET, DELAYED RELEASE ORAL at 06:07

## 2023-11-12 RX ADMIN — FERROUS SULFATE TAB 325 MG (65 MG ELEMENTAL FE) 325 MG: 325 (65 FE) TAB at 09:41

## 2023-11-12 RX ADMIN — HYDROCODONE BITARTRATE AND ACETAMINOPHEN 2 TABLET: 5; 325 TABLET ORAL at 09:38

## 2023-11-12 RX ADMIN — SUCRALFATE 1 G: 1 TABLET ORAL at 16:09

## 2023-11-12 RX ADMIN — PANTOPRAZOLE SODIUM 40 MG: 40 TABLET, DELAYED RELEASE ORAL at 16:09

## 2023-11-12 RX ADMIN — ESCITALOPRAM OXALATE 20 MG: 20 TABLET ORAL at 09:40

## 2023-11-12 RX ADMIN — SODIUM CHLORIDE, PRESERVATIVE FREE 10 ML: 5 INJECTION INTRAVENOUS at 09:39

## 2023-11-12 RX ADMIN — HYDROCODONE BITARTRATE AND ACETAMINOPHEN 2 TABLET: 5; 325 TABLET ORAL at 22:05

## 2023-11-12 RX ADMIN — IOPAMIDOL 80 ML: 755 INJECTION, SOLUTION INTRAVENOUS at 12:44

## 2023-11-12 RX ADMIN — CARVEDILOL 3.12 MG: 3.12 TABLET, FILM COATED ORAL at 16:09

## 2023-11-12 RX ADMIN — CARVEDILOL 3.12 MG: 3.12 TABLET, FILM COATED ORAL at 09:40

## 2023-11-12 RX ADMIN — ATORVASTATIN CALCIUM 40 MG: 40 TABLET, FILM COATED ORAL at 19:44

## 2023-11-12 RX ADMIN — FOLIC ACID 1 MG: 1 TABLET ORAL at 09:41

## 2023-11-12 RX ADMIN — HYDROCODONE BITARTRATE AND ACETAMINOPHEN 2 TABLET: 5; 325 TABLET ORAL at 16:09

## 2023-11-12 RX ADMIN — MOMETASONE FUROATE AND FORMOTEROL FUMARATE DIHYDRATE 2 PUFF: 200; 5 AEROSOL RESPIRATORY (INHALATION) at 18:49

## 2023-11-12 RX ADMIN — SUCRALFATE 1 G: 1 TABLET ORAL at 22:06

## 2023-11-12 RX ADMIN — AMOXICILLIN AND CLAVULANATE POTASSIUM 1 TABLET: 875; 125 TABLET, FILM COATED ORAL at 09:41

## 2023-11-12 RX ADMIN — ENOXAPARIN SODIUM 40 MG: 100 INJECTION SUBCUTANEOUS at 09:39

## 2023-11-12 RX ADMIN — AMOXICILLIN AND CLAVULANATE POTASSIUM 1 TABLET: 875; 125 TABLET, FILM COATED ORAL at 19:44

## 2023-11-12 RX ADMIN — SUCRALFATE 1 G: 1 TABLET ORAL at 09:39

## 2023-11-12 RX ADMIN — QUETIAPINE FUMARATE 600 MG: 200 TABLET, EXTENDED RELEASE ORAL at 19:44

## 2023-11-12 RX ADMIN — MOMETASONE FUROATE AND FORMOTEROL FUMARATE DIHYDRATE 2 PUFF: 200; 5 AEROSOL RESPIRATORY (INHALATION) at 08:02

## 2023-11-12 RX ADMIN — SUCRALFATE 1 G: 1 TABLET ORAL at 13:37

## 2023-11-12 RX ADMIN — BUPROPION HYDROCHLORIDE 150 MG: 150 TABLET, FILM COATED, EXTENDED RELEASE ORAL at 09:40

## 2023-11-12 RX ADMIN — Medication 50 MG: at 09:42

## 2023-11-12 RX ADMIN — FERROUS SULFATE TAB 325 MG (65 MG ELEMENTAL FE) 325 MG: 325 (65 FE) TAB at 19:44

## 2023-11-12 RX ADMIN — TRAZODONE HYDROCHLORIDE 200 MG: 100 TABLET ORAL at 22:05

## 2023-11-12 ASSESSMENT — PAIN SCALES - GENERAL
PAINLEVEL_OUTOF10: 8
PAINLEVEL_OUTOF10: 8
PAINLEVEL_OUTOF10: 4
PAINLEVEL_OUTOF10: 8

## 2023-11-12 ASSESSMENT — PAIN DESCRIPTION - ORIENTATION
ORIENTATION: LOWER;LEFT
ORIENTATION: LEFT

## 2023-11-12 ASSESSMENT — PAIN DESCRIPTION - ONSET: ONSET: ON-GOING

## 2023-11-12 ASSESSMENT — PAIN DESCRIPTION - LOCATION
LOCATION: ABDOMEN
LOCATION: BACK
LOCATION: ABDOMEN

## 2023-11-12 ASSESSMENT — PAIN DESCRIPTION - FREQUENCY: FREQUENCY: CONTINUOUS

## 2023-11-12 ASSESSMENT — PAIN DESCRIPTION - DESCRIPTORS
DESCRIPTORS: ACHING
DESCRIPTORS: ACHING

## 2023-11-12 ASSESSMENT — PAIN DESCRIPTION - PAIN TYPE: TYPE: ACUTE PAIN

## 2023-11-12 ASSESSMENT — PAIN SCALES - WONG BAKER: WONGBAKER_NUMERICALRESPONSE: 0

## 2023-11-12 NOTE — FLOWSHEET NOTE
11/12/23 1704   Safe Environment   Safety Measures Bed in low position; Fall prevention (comment); Side rails X 2;Standard Safety Measures  (Virtual nurse safety round completed.)     Introduced self and explained virtual nursing. Patient is awake and  alert and answers questions. No distress visible. Inquired if patient had needs for addressing pain, potty, positioning, access to personal things and any other personal needs. No needs at this time. Patient is using buttons on her bed if she needs to call nurse.

## 2023-11-13 VITALS
RESPIRATION RATE: 18 BRPM | BODY MASS INDEX: 27.59 KG/M2 | HEIGHT: 64 IN | DIASTOLIC BLOOD PRESSURE: 60 MMHG | WEIGHT: 161.6 LBS | OXYGEN SATURATION: 95 % | SYSTOLIC BLOOD PRESSURE: 135 MMHG | HEART RATE: 64 BPM | TEMPERATURE: 98 F

## 2023-11-13 DIAGNOSIS — N15.1 RENAL ABSCESS, LEFT: Primary | ICD-10-CM

## 2023-11-13 PROBLEM — F33.9 MAJOR DEPRESSIVE DISORDER, RECURRENT (HCC): Status: RESOLVED | Noted: 2019-03-11 | Resolved: 2023-11-13

## 2023-11-13 PROBLEM — A41.9 SEPTICEMIA (HCC): Status: RESOLVED | Noted: 2019-12-21 | Resolved: 2023-11-13

## 2023-11-13 PROBLEM — N12 PYELONEPHRITIS OF LEFT KIDNEY: Status: RESOLVED | Noted: 2019-12-21 | Resolved: 2023-11-13

## 2023-11-13 PROBLEM — J44.1 COPD EXACERBATION (HCC): Status: RESOLVED | Noted: 2018-02-18 | Resolved: 2023-11-13

## 2023-11-13 PROBLEM — R09.81 NASAL CONGESTION: Status: RESOLVED | Noted: 2019-07-20 | Resolved: 2023-11-13

## 2023-11-13 PROBLEM — E87.8 ELECTROLYTE ABNORMALITY: Status: RESOLVED | Noted: 2018-02-18 | Resolved: 2023-11-13

## 2023-11-13 PROBLEM — E78.2 MIXED HYPERLIPIDEMIA: Status: RESOLVED | Noted: 2019-07-20 | Resolved: 2023-11-13

## 2023-11-13 PROBLEM — Z48.89 ENCOUNTER FOR SURGICAL FOLLOW-UP CARE: Status: RESOLVED | Noted: 2019-12-11 | Resolved: 2023-11-13

## 2023-11-13 PROBLEM — J34.3 HYPERTROPHY OF INFERIOR NASAL TURBINATE: Status: RESOLVED | Noted: 2018-05-08 | Resolved: 2023-11-13

## 2023-11-13 PROBLEM — R79.89 ELEVATED LACTIC ACID LEVEL: Status: RESOLVED | Noted: 2019-12-21 | Resolved: 2023-11-13

## 2023-11-13 PROBLEM — J30.89 ENVIRONMENTAL AND SEASONAL ALLERGIES: Status: RESOLVED | Noted: 2019-07-20 | Resolved: 2023-11-13

## 2023-11-13 PROBLEM — R06.89 ACUTE RESPIRATORY INSUFFICIENCY: Status: RESOLVED | Noted: 2018-02-18 | Resolved: 2023-11-13

## 2023-11-13 PROBLEM — S93.601A SPRAIN OF RIGHT FOOT: Status: RESOLVED | Noted: 2019-12-11 | Resolved: 2023-11-13

## 2023-11-13 PROBLEM — J44.1 ACUTE EXACERBATION OF CHRONIC OBSTRUCTIVE PULMONARY DISEASE (COPD) (HCC): Status: RESOLVED | Noted: 2022-11-04 | Resolved: 2023-11-13

## 2023-11-13 PROBLEM — F29 PSYCHOSIS (HCC): Status: RESOLVED | Noted: 2018-10-25 | Resolved: 2023-11-13

## 2023-11-13 PROBLEM — N17.1 ACUTE RENAL FAILURE WITH ACUTE CORTICAL NECROSIS (HCC): Status: RESOLVED | Noted: 2019-12-21 | Resolved: 2023-11-13

## 2023-11-13 PROBLEM — G56.02 CARPAL TUNNEL SYNDROME OF LEFT WRIST: Status: RESOLVED | Noted: 2019-12-11 | Resolved: 2023-11-13

## 2023-11-13 PROBLEM — F33.9 DEPRESSION, MAJOR, RECURRENT (HCC): Status: RESOLVED | Noted: 2019-05-17 | Resolved: 2023-11-13

## 2023-11-13 PROBLEM — B00.9 HSV-2 (HERPES SIMPLEX VIRUS 2) INFECTION: Status: RESOLVED | Noted: 2023-11-02 | Resolved: 2023-11-13

## 2023-11-13 PROBLEM — R51.9 RHINOGENIC HEADACHE: Status: RESOLVED | Noted: 2018-05-08 | Resolved: 2023-11-13

## 2023-11-13 PROBLEM — N12 PYELONEPHRITIS: Status: RESOLVED | Noted: 2019-12-21 | Resolved: 2023-11-13

## 2023-11-13 PROBLEM — B37.83 ANGULAR CHEILITIS WITH CANDIDIASIS: Status: RESOLVED | Noted: 2023-10-30 | Resolved: 2023-11-13

## 2023-11-13 PROBLEM — J34.2 NASAL SEPTAL DEVIATION: Status: RESOLVED | Noted: 2018-05-08 | Resolved: 2023-11-13

## 2023-11-13 PROBLEM — R29.90 STROKE-LIKE SYMPTOM: Status: RESOLVED | Noted: 2019-05-05 | Resolved: 2023-11-13

## 2023-11-13 LAB
ANION GAP SERPL CALC-SCNC: 10 MEQ/L (ref 8–16)
BUN SERPL-MCNC: 18 MG/DL (ref 7–22)
CALCIUM SERPL-MCNC: 10.7 MG/DL (ref 8.5–10.5)
CHLORIDE SERPL-SCNC: 101 MEQ/L (ref 98–111)
CO2 SERPL-SCNC: 25 MEQ/L (ref 23–33)
CREAT SERPL-MCNC: 0.7 MG/DL (ref 0.4–1.2)
DEPRECATED RDW RBC AUTO: 59.8 FL (ref 35–45)
ERYTHROCYTE [DISTWIDTH] IN BLOOD BY AUTOMATED COUNT: 19.2 % (ref 11.5–14.5)
GFR SERPL CREATININE-BSD FRML MDRD: > 60 ML/MIN/1.73M2
GLUCOSE SERPL-MCNC: 119 MG/DL (ref 70–108)
HCT VFR BLD AUTO: 36.4 % (ref 37–47)
HGB BLD-MCNC: 10.6 GM/DL (ref 12–16)
MCH RBC QN AUTO: 25.5 PG (ref 26–33)
MCHC RBC AUTO-ENTMCNC: 29.1 GM/DL (ref 32.2–35.5)
MCV RBC AUTO: 87.5 FL (ref 81–99)
PLATELET # BLD AUTO: 350 THOU/MM3 (ref 130–400)
PMV BLD AUTO: 9.1 FL (ref 9.4–12.4)
POTASSIUM SERPL-SCNC: 4.7 MEQ/L (ref 3.5–5.2)
RBC # BLD AUTO: 4.16 MILL/MM3 (ref 4.2–5.4)
SODIUM SERPL-SCNC: 136 MEQ/L (ref 135–145)
WBC # BLD AUTO: 7.5 THOU/MM3 (ref 4.8–10.8)

## 2023-11-13 PROCEDURE — 6360000002 HC RX W HCPCS: Performed by: STUDENT IN AN ORGANIZED HEALTH CARE EDUCATION/TRAINING PROGRAM

## 2023-11-13 PROCEDURE — 99239 HOSP IP/OBS DSCHRG MGMT >30: CPT

## 2023-11-13 PROCEDURE — 6370000000 HC RX 637 (ALT 250 FOR IP): Performed by: STUDENT IN AN ORGANIZED HEALTH CARE EDUCATION/TRAINING PROGRAM

## 2023-11-13 PROCEDURE — 85027 COMPLETE CBC AUTOMATED: CPT

## 2023-11-13 PROCEDURE — 6370000000 HC RX 637 (ALT 250 FOR IP): Performed by: INTERNAL MEDICINE

## 2023-11-13 PROCEDURE — 80048 BASIC METABOLIC PNL TOTAL CA: CPT

## 2023-11-13 PROCEDURE — 2580000003 HC RX 258: Performed by: INTERNAL MEDICINE

## 2023-11-13 PROCEDURE — 6370000000 HC RX 637 (ALT 250 FOR IP)

## 2023-11-13 PROCEDURE — 36415 COLL VENOUS BLD VENIPUNCTURE: CPT

## 2023-11-13 RX ORDER — HYDROCODONE BITARTRATE AND ACETAMINOPHEN 5; 325 MG/1; MG/1
1 TABLET ORAL EVERY 6 HOURS PRN
Qty: 28 TABLET | Refills: 0 | Status: SHIPPED | OUTPATIENT
Start: 2023-11-13 | End: 2023-11-20

## 2023-11-13 RX ADMIN — PANTOPRAZOLE SODIUM 40 MG: 40 TABLET, DELAYED RELEASE ORAL at 08:38

## 2023-11-13 RX ADMIN — HYDROCODONE BITARTRATE AND ACETAMINOPHEN 2 TABLET: 5; 325 TABLET ORAL at 13:42

## 2023-11-13 RX ADMIN — SODIUM CHLORIDE, PRESERVATIVE FREE 10 ML: 5 INJECTION INTRAVENOUS at 08:40

## 2023-11-13 RX ADMIN — ESCITALOPRAM OXALATE 20 MG: 20 TABLET ORAL at 08:40

## 2023-11-13 RX ADMIN — CARVEDILOL 3.12 MG: 3.12 TABLET, FILM COATED ORAL at 08:38

## 2023-11-13 RX ADMIN — FOLIC ACID 1 MG: 1 TABLET ORAL at 08:38

## 2023-11-13 RX ADMIN — SODIUM CHLORIDE, PRESERVATIVE FREE 10 ML: 5 INJECTION INTRAVENOUS at 06:54

## 2023-11-13 RX ADMIN — SUCRALFATE 1 G: 1 TABLET ORAL at 13:39

## 2023-11-13 RX ADMIN — SUCRALFATE 1 G: 1 TABLET ORAL at 08:43

## 2023-11-13 RX ADMIN — ARIPIPRAZOLE 2 MG: 2 TABLET ORAL at 08:42

## 2023-11-13 RX ADMIN — ENOXAPARIN SODIUM 40 MG: 100 INJECTION SUBCUTANEOUS at 08:40

## 2023-11-13 RX ADMIN — ASPIRIN 81 MG: 81 TABLET, COATED ORAL at 08:48

## 2023-11-13 RX ADMIN — BUPROPION HYDROCHLORIDE 150 MG: 150 TABLET, FILM COATED, EXTENDED RELEASE ORAL at 08:40

## 2023-11-13 RX ADMIN — TRAZODONE HYDROCHLORIDE 200 MG: 100 TABLET ORAL at 08:39

## 2023-11-13 RX ADMIN — AMOXICILLIN AND CLAVULANATE POTASSIUM 1 TABLET: 875; 125 TABLET, FILM COATED ORAL at 08:38

## 2023-11-13 RX ADMIN — LEVOTHYROXINE SODIUM 75 MCG: 0.07 TABLET ORAL at 08:38

## 2023-11-13 RX ADMIN — FENOFIBRATE 160 MG: 160 TABLET ORAL at 08:39

## 2023-11-13 RX ADMIN — FERROUS SULFATE TAB 325 MG (65 MG ELEMENTAL FE) 325 MG: 325 (65 FE) TAB at 08:39

## 2023-11-13 RX ADMIN — HYDROCODONE BITARTRATE AND ACETAMINOPHEN 2 TABLET: 5; 325 TABLET ORAL at 05:05

## 2023-11-13 RX ADMIN — Medication 50 MG: at 08:40

## 2023-11-13 ASSESSMENT — PAIN - FUNCTIONAL ASSESSMENT: PAIN_FUNCTIONAL_ASSESSMENT: PREVENTS OR INTERFERES SOME ACTIVE ACTIVITIES AND ADLS

## 2023-11-13 ASSESSMENT — PAIN DESCRIPTION - ORIENTATION
ORIENTATION: LEFT;LOWER
ORIENTATION: LOWER;LEFT

## 2023-11-13 ASSESSMENT — PAIN SCALES - WONG BAKER
WONGBAKER_NUMERICALRESPONSE: 0

## 2023-11-13 ASSESSMENT — PAIN DESCRIPTION - LOCATION
LOCATION: ABDOMEN

## 2023-11-13 ASSESSMENT — PAIN DESCRIPTION - PAIN TYPE: TYPE: ACUTE PAIN

## 2023-11-13 ASSESSMENT — PAIN SCALES - GENERAL
PAINLEVEL_OUTOF10: 6
PAINLEVEL_OUTOF10: 5

## 2023-11-13 ASSESSMENT — PAIN DESCRIPTION - FREQUENCY: FREQUENCY: CONTINUOUS

## 2023-11-13 ASSESSMENT — PAIN DESCRIPTION - DESCRIPTORS
DESCRIPTORS: ACHING
DESCRIPTORS: ACHING

## 2023-11-13 NOTE — FLOWSHEET NOTE
11/13/23 1005   Safe Environment   Safety Measures Call light within reach  (Virtual Nurse Safety Round Complete)     Call placed to patients room, patient did not respond to audio, video turned on for safety purposes. Patient is resting in chair with eyes closed, call light within reach, no signs or symtpoms of distress noted.

## 2023-11-13 NOTE — CARE COORDINATION
10/30/23, 1:49 PM EDT  Discharge Planning Evaluation  Social work consult received, patient from [facility]. Patient/Family preference is to return to [address] Would patient be willing to go to a skilled facility if needed: yes if needed. Is there skilled care available at current facility: no.  Barriers to return to current living situation: n/a  Spoke with Frederic Ochoa at the facility. Patient bed hold: yes  Anticipated transport plan: ambulette  Patient's Healthcare Decision Maker: Named in [address]     Readmission Risk Low 0-14, Mod 15-19), High > 20: Readmission Risk Score: 21    Current PCP: Fatemeh Ott MD  PCP verified by CM? Yes    Patient Orientation: Alert and Oriented    Patient Cognition: Alert  History Provided by: Patient    Advance Directives:      Code Status: Full Code   Patient's Primary Decision Maker is: Named in [id number] [employer] (Active): Micky Jimenez Brother/Sister - [phone]     Discharge Planning:    Patient lives with: Other (Comment) (nursing home) Type of Home: Assisted living  Primary Care Giver: Self  Patient Support Systems include: Family Members   Current Financial resources: None  Current community resources: Assisted Living  Current services prior to admission: None            Current DME:              Type of Home Care services:  Nursing Services    ADLS  Prior functional level: Assistance with the following:, Bathing, Housework, Cooking, Mobility, Shopping  Current functional level: Assistance with the following:, Mobility, Shopping, Bathing, [address], Housework    Family can provide assistance at DC: No  Would you like Case Management to discuss the discharge plan with any other family members/significant others, and if so, who?  No  Plans to Return to Present Housing: Yes  Other Identified Issues/Barriers to RETURNING to current housing: n/a  Potential Assistance needed at discharge: N/A            Potential DME:
10/31/23, 2:14 PM EDT    DISCHARGE ON GOING [place] day: 2  Location: -28/028-A Reason for admit: Perinephric abscess [N15.1]  Renal abscess, left [N15.1]  Lesion of labia [N90.89]   Procedure: 10/29 CT Abd/Plv W: 1. Multiple subcapsular fluid collections at the left kidney with adjacent perinephric stranding suspicious for abscesses. 2. Large fluid collection in the left abdomen adjacent to the left kidney suspicious for abscess. 3. Left-sided nephrolithiasis. 4. Splenomegaly. 5. Gas in the urinary bladder which may be secondary to recent instrumentation. However, infection cannot be excluded. 6. Sigmoid colon diverticulosis. 10/30 IR drain insertion  Barriers to Discharge: Hospitalist and Urology following. WBC down to 9.5 today. Culture from L kidney abscess growing proteus species. Urine growing proteus mirabilis and klebsiella pneumoniae. Blood cultures show no growth at 24 hours. IVF. IV Diflucan and Zosyn. Purulent drainage from hemovac. PCP: Manasa Navarrete MD  Readmission Risk Score: 22.3%  Patient Goals/Plan/Treatment Preferences: Planning to return to [facility].
11/1/23, 11:17 AM EDT    DISCHARGE ON [place] day: 3  Location: -28/028-A Reason for admit: Perinephric abscess [N15.1]  Renal abscess, left [N15.1]  Lesion of labia [N90.89]   Procedure: 10/29 CT Abd/Plv W: 1. Multiple subcapsular fluid collections at the left kidney with adjacent perinephric stranding suspicious for abscesses. 2. Large fluid collection in the left abdomen adjacent to the left kidney suspicious for abscess. 3. Left-sided nephrolithiasis. 4. Splenomegaly. 5. Gas in the urinary bladder which may be secondary to recent instrumentation. However, infection cannot be excluded. 6. Sigmoid colon diverticulosis. 10/30 IR drain insertion  Barriers to Discharge: Hospitalist and Urology following. No new drainage from drain. Culture from L kidney growing proteus mirabilis. Genital culture growing yeast. Blood cultures no growth. IVF. IV Diflucan. IV Zosyn. PCP: Luci Hyde MD  Readmission Risk Score: 22.3%  Patient Goals/Plan/Treatment Preferences: Planning to return to [facility].
11/10/23, 3:42 PM EST    DISCHARGE PLANNING EVALUATION      Pre-cert pending with [facility]. They will contact RN if approved, blue envelope on chart.
11/13/23, 10:45 AM EST    Patient goals/plan/ treatment preferences discussed by  and . Patient goals/plan/ treatment preferences reviewed with patient/ family. Patient/ family verbalize understanding of discharge plan and are in agreement with goal/plan/treatment preferences. Understanding was demonstrated using the teach back method. AVS provided by RN at time of discharge, which includes all necessary medical information pertaining to the patients current course of illness, treatment, post-discharge goals of care, and treatment preferences. Services At/After Discharge: [facility]       IMM Letter  IMM Letter given to Patient/Family/Significant other/Guardian/POA/by[de-identified] Jamil Marti RN CM  IMM Letter date given[de-identified] 11/13/23  IMM Letter time given[de-identified] 9911        HESHAM-SIMA approved today for admissions. ROBERTO updated RN and Patient. Ambulette arranged for 3:30pm. AVS Faxed and RN updated with blue envelope for report. ROBERTO updated Saloni Burch at [address]
11/2/23, 11:44 AM EDT    DISCHARGE PLANNING EVALUATION    ROBERTO spoke with Kaitlynn Pena staff at [facility] [place], advised her of no discharge today. Kaitlynn Pena stated if pt does not discharge tomorrow by 2pm they would not be able to accept pt over the weekend. If pt does come with drain, will need HH orders for RN (drain/dressing care). Kaitlynn Pena also stated any new medications must be sent to [facility] in [address] [place] [address] faxed updated clinicals (ph #[phone], fx #[phone])    1:07 PM update: ROBERTO was advised by attending, no discharge planned until next week. ROBERTO updated Kaitlynn Pena at [facility]. ROBERTO will call on Monday to provide updated.
11/6/23, 3:10 PM EST    DISCHARGE PLANNING EVALUATION    This SW did talk with [employer] from [place], she reports that patient is not able to return with this drain because it needs flushed twice a day. Reports that patient will need to be skilled at a facility until drain is removed.
11/7/23, 10:40 AM EST    DISCHARGE PLANNING EVALUATION    This SW did attempt to see patient to discuss ECF placement, however she is in the shower at this time. SW did leave a message for patient's sister who is listed as secondary decision maker. 12:14 PM- This SW spoke with patient, she is frustrated about not returning to [place] but is understanding of the issues. She reports that she doesn't really care where she goes, \"just start somewhere\". SW did try [facility] as they would be closest to patient's current facility, spoke with Anna Marie Iyer there and she reports that they have a lengthy wait list to get it. SW will try next facilities on the list. Patient is also indifferent of location, wants best star ratings. SW did make referral to [employer] [facility], they will review and call SW back. 1:56 PM- Received a call from [employer] with [address], they are unable to accept due to medical complexity. SW will try next facility.
11/7/23, 8:58 AM EST    DISCHARGE ON GOING EVALUATION    Aquilino Miramontes day: 9  Location: -28/028-A Reason for admit: Perinephric abscess [N15.1]  Renal abscess, left [N15.1]  Lesion of labia [N90.89]   Procedure:    10/29 CT Abd/Plv W: 1. Multiple subcapsular fluid collections at the left kidney with adjacent perinephric stranding suspicious for abscesses. 2. Large fluid collection in the left abdomen adjacent to the left kidney suspicious for abscess. 3. Left-sided nephrolithiasis. 4. Splenomegaly. 5. Gas in the urinary bladder which may be secondary to recent instrumentation. However, infection cannot be excluded. 6. Sigmoid colon diverticulosis. 10/30 IR drain insertion  11/2 CT Abdomen Pelvis WO Contrast 1. A drainage catheter enters the left flank posteriorly with tip terminating in the central aspect of the left posterior perinephric fluid collection. The left posterior perinephric fluid collection has significantly decreased in size now measuring 3.1   x 6.5 cm in short axis (previously measured up to 6.8 x 11.1 cm in short axis). The smaller fluid collections adjacent to the lower pole of the left kidney are also smaller although difficult to characterize without IV contrast. There continues to be   mass effect on the posterior left kidney with a subcapsular collection of fluid along the posterior left kidney measuring up to 3.0 cm in thickness (previously measured 3.3 cm). Evaluation of the renal parenchyma is limited without IV contrast. Moderate   left flank subcutaneous edema and thickening of the left flank soft tissues is visualized surrounding the catheter. No discrete abdominal wall fluid collection is observed. 2. Nonobstructing left nephrolithiasis appears stable. No obstructive uropathy is observed.  Chronic findings are discussed    Barriers to Discharge: ID following, miguelina close drain maintenance, Zovirax, Albuterol nebs, Abilify, Asa, Lipitor, Wellbutrin, Coreg,
11/8/23, 10:21 AM EST    DISCHARGE PLANNING EVALUATION    SW received VM from Bailey MATA, patients POA. ROBERTO returned Bailey's call and Bailey reported she would like a referral to be made to [facility]. Bailey stated patient's niece worked there and facility is aware of her mental health diagnosis. ROBERTO spoke with Mary at [facility] and made referral for snf placement. Mary reported she will look into patient and call with decision. Patient will need a precert to come skilled. 2:11 PM  Spoke with Mary from [facility]. Mary reports patient was denied due to her insurance. She is out of network. Being that patient keeps receiving denials, ROBERTO spoke with Daniel Berrios at [address] Daniel Berrios reports reason they feel they cannot care for her is the needs of patient's drain. Daniel Berrios reported if [facility] agency is able to help with drain patient would be able to return. ROBERTO spoke with Lan Rankin at [address] [place] [address] who is in network with patient's insurance and gave referral. Lan Rankin stated she would look over referral and call back with decision. Will continue to follow. 3:16 PM  Spoke to Collette from [address] [place] [address]. Collette stated the most they would be able to come out is 2x a week to help flush drain until patient can do it on her own. ROBERTO spoke with Director of [address] [place] [address] D who stated CM should tell floor nurse to start teaching patient how to flush her own drain. Discharge plans for tomorrow to return to [address]  facility with [facility] for drain assistance. Spoke with Daniel Berrios whom agreed with discharge plan. D/C plan for tomorrow. Will need HH orders, DIANA signed, and discharge order.      Phone Numbers:   Casandra Burden: [address]: [address]: [phone]
11/8/23, 8:44 AM EST    DISCHARGE ON GOING EVALUATION    Catina Solar day: 10  Location: 6-28/028-A Reason for admit: Perinephric abscess [N15.1]  Renal abscess, left [N15.1]  Lesion of labia [N90.89]   Procedure:    10/29 CT Abd/Plv W: 1. Multiple subcapsular fluid collections at the left kidney with adjacent perinephric stranding suspicious for abscesses. 2. Large fluid collection in the left abdomen adjacent to the left kidney suspicious for abscess. 3. Left-sided nephrolithiasis. 4. Splenomegaly. 5. Gas in the urinary bladder which may be secondary to recent instrumentation. However, infection cannot be excluded. 6. Sigmoid colon diverticulosis. 10/30 IR drain insertion  11/2 CT Abdomen Pelvis WO Contrast 1. A drainage catheter enters the left flank posteriorly with tip terminating in the central aspect of the left posterior perinephric fluid collection. The left posterior perinephric fluid collection has significantly decreased in size now measuring 3.1   x 6.5 cm in short axis (previously measured up to 6.8 x 11.1 cm in short axis). The smaller fluid collections adjacent to the lower pole of the left kidney are also smaller although difficult to characterize without IV contrast. There continues to be   mass effect on the posterior left kidney with a subcapsular collection of fluid along the posterior left kidney measuring up to 3.0 cm in thickness (previously measured 3.3 cm). Evaluation of the renal parenchyma is limited without IV contrast. Moderate   left flank subcutaneous edema and thickening of the left flank soft tissues is visualized surrounding the catheter. No discrete abdominal wall fluid collection is observed. 2. Nonobstructing left nephrolithiasis appears stable. No obstructive uropathy is observed.  Chronic findings are discussed  Barriers to Discharge: ID following, miguelina close drain maintenance, Zovirax, Albuterol nebs, Abilify, Asa, Lipitor, Wellbutrin, Coreg, Lovenox,
11/9/23, 10:27 AM EST    DISCHARGE PLANNING EVALUATION       SW spoke with RN regarding drain management at home and RN feels Patient will struggle with maintaining flushes herself due to location of drain. Patient has not done any flushes herself at this point. Nakia Vargas from [address] here to assess and also feels that Patient is not able to manage her drain herself with HH helping at the facility and they prefer ECF placement for the time of the drain. Nakia Vargas and ROBERTO have spoke with Patient and she is in agreement and is in agreement with any facility as long as it is close by. Referral made to [facility] and spoke with Kelley Bain with [facility] present on unit and they are able to accept Patient pending pre-cert but orders are needed for PT/OT. CM updated and orders placed. ROBERTO updated Francois Slater with [address] regarding acceptance at [facility] pending pre-cert.
11/9/23, 12:56 PM EST    DISCHARGE ON GOING EVALUATION    Radha Skinner day: 11  Location: -28/028-A Reason for admit: Perinephric abscess [N15.1]  Renal abscess, left [N15.1]  Lesion of labia [N90.89]   Procedure:    10/29 CT Abd/Plv W: 1. Multiple subcapsular fluid collections at the left kidney with adjacent perinephric stranding suspicious for abscesses. 2. Large fluid collection in the left abdomen adjacent to the left kidney suspicious for abscess. 3. Left-sided nephrolithiasis. 4. Splenomegaly. 5. Gas in the urinary bladder which may be secondary to recent instrumentation. However, infection cannot be excluded. 6. Sigmoid colon diverticulosis. 10/30 IR drain insertion  11/2 CT Abdomen Pelvis WO Contrast 1. A drainage catheter enters the left flank posteriorly with tip terminating in the central aspect of the left posterior perinephric fluid collection. The left posterior perinephric fluid collection has significantly decreased in size now measuring 3.1   x 6.5 cm in short axis (previously measured up to 6.8 x 11.1 cm in short axis). The smaller fluid collections adjacent to the lower pole of the left kidney are also smaller although difficult to characterize without IV contrast. There continues to be   mass effect on the posterior left kidney with a subcapsular collection of fluid along the posterior left kidney measuring up to 3.0 cm in thickness (previously measured 3.3 cm). Evaluation of the renal parenchyma is limited without IV contrast. Moderate   left flank subcutaneous edema and thickening of the left flank soft tissues is visualized surrounding the catheter. No discrete abdominal wall fluid collection is observed. Barriers to Discharge: Await accepting facility. Hospitalist and ID following. Drain care, flushed bid. Augmentin. Pain and nausea control. PCP: Jr Jones MD  Readmission Risk Score: 18.4%  Patient Goals/Plan/Treatment Preferences: Pt from [address]
Wellbutrin, Coreg, Lovenox, Lexapro, Triglide, Ferrous Sulfate, folic acid, pain control, Synthroid, Protonix, Seroquel, Carafate, Desyrel. PCP: Ayden Uriarte MD  Readmission Risk Score: 20.9%  Patient Goals/Plan/Treatment Preferences:  [facility] SW following.
renal abscess. Lesion of labia. Urology consulted. IR completed drain insertion today, sent to culture. WBC 14.1 on admission, 10.8 today. UA: trace blood, 4 urobilinogen, moderate leukocytes, cloudy, growing gram - bacilli. IV Diflucan and Zosyn. IVF. NPO with sips with meds and ice chips. PCP: Luci Hyde MD    Readmission Risk Low 0-14, Mod 15-19), High > 20: Readmission Risk Score: 21      Advance Directives:      Code Status: Full Code   Patient's Primary Decision Maker is: Named in [id number] [employer] (Active): Yobany Cook Brother/Sister - [phone]    Patient Goals/Plan/Treatment Preferences: Planning to return to [facility]. See  notes for further information. Transportation/Food Security/Housekeeping Addressed: No issues identified. If patient is discharged prior to next notation, then this note serves as note for discharge by case management.

## 2023-11-13 NOTE — FLOWSHEET NOTE
11/13/23 1303   Safe Environment   Safety Measures Bed in low position;Call light within reach; Side rails X 2  (Virtual Nurse Safety Round Complete)     Call placed to patients room, patient responds to audio, permitted video. Patient alert and oriented. Patient denied any voiced conscerns/complaints at this time. Patient educated on utilizing call light. Call light within reach, no signs or symtpoms of distress noted.

## 2023-11-14 NOTE — DISCHARGE SUMMARY
Hospitalist Discharge Summary    Patient: Jeniffer Ortiz  YOB: 1955  MRN: [id number]   Acct: [de-identified]    Primary Care Physician: Alfreda Bejarano MD    Admit date  10/29/2023    Discharge date: 11/13/2023     Discharge Assessment and Plan:    Perinephric abscess: S/p placement of drain. Initially placed on Zosyn, p.o. Augmentin 11/6 to 11/27. Flush drain every shift, continue outpatient. Repeat CT abdomen/pelvis 11/12/2023 shows remaining subscapular abscess with the rest of the abscess resolved. Follow-up CT scan in 3 weeks. HSV type II: New onset. Acyclovir 400 mg 3 times daily for total 5 days completed  Angular cheilitis: S/p fluconazole  Chronic normocytic anemia: Stable  Vitamin B6 deficiency: B6 supplementation  Hyponatremia  History of DVT/PE  Bipolar 1 disorder  COPD, no acute exacerbation  Essential hypertension  Hyperlipidemia      Chief Complaint on presentation: flank pain    Initial H&P / Hospital Course:   From initial HPI 10/29: Jeniffer Ortiz is a 79 y.o. female with PMHx of bipolar 1, urinary retention, HTN, HLD who presents to [address] [place] [address] with abdominal pain. Patient was recently treated with Botox for urinary retention and also over the last couple of weeks/months has been treated for UTI for Proteus species. She was being treated for UTI again at her nursing facility with reportedly Bactrim and developed worsening left abdominal pain. Review of prior cultures demonstrates that Proteus species was resistant to Bactrim. I do not have any other cultures to compare to at this time. In the ER she was found to have a large fluid collection which on my personal review does appear to be more pronounced from prior fluid collection concerning for perinephric abscess. She is currently afebrile but has a leukocytosis with immature granulocytes. Pain is 10 out of 10.   She was started on IV fluids and IV antibiotics and will be admitted for possible

## 2023-11-14 NOTE — TELEPHONE ENCOUNTER
ECF pt  Did not admit with valid rx from [facility]. ASSESSMENT & PLAN   Diagnosis Orders   1. Renal abscess, left  HYDROcodone-acetaminophen (NORCO) 5-325 MG per tablet          OAARS reviewed and appropriate. Controlled Substances Monitoring: Periodic Controlled Substance Monitoring: Possible medication side effects, risk of tolerance/dependence & alternative treatments discussed., No signs of potential drug abuse or diversion identified.  Jason Garcia DO)      Future Appointments   Date Time Provider [address]   11/16/2023  1:00 PM STR PULMONARY FUNCTION ROOM 1 STRZ PFT [facility] HOD   11/16/2023  2:00 PM STR CT IMAGING RM1  OP [facility] STRZ OUT EXP STR Rad/Card   11/22/2023 11:40 AM Megan Yates MD [address]   11/30/2023 11:30 AM STR PCACC CT IMAGING RM1 STRZ PUT OP STR [place]   12/5/2023 10:45 AM Constance Holley APRN - CNP N [facility]   12/20/2023 11:00 AM Nicole Llanos, PT STRZ [address] [place] [address]   1/11/2024 12:45 PM Alona Gomez MD N [facility]

## 2023-11-16 ENCOUNTER — HOSPITAL ENCOUNTER (OUTPATIENT)
Dept: PULMONOLOGY | Age: 68
Discharge: HOME | End: 2023-11-16
Attending: INTERNAL MEDICINE
Payer: MEDICARE

## 2023-11-16 ENCOUNTER — APPOINTMENT (OUTPATIENT)
Dept: CT IMAGING | Age: 68
End: 2023-11-16
Attending: INTERNAL MEDICINE
Payer: MEDICARE

## 2023-11-16 DIAGNOSIS — F17.200 SMOKER UNMOTIVATED TO QUIT: ICD-10-CM

## 2023-11-16 DIAGNOSIS — J42 CHRONIC BRONCHITIS, UNSPECIFIED CHRONIC BRONCHITIS TYPE (HCC): ICD-10-CM

## 2023-11-16 DIAGNOSIS — J44.9 CHRONIC OBSTRUCTIVE PULMONARY DISEASE, UNSPECIFIED COPD TYPE (HCC): ICD-10-CM

## 2023-11-16 PROCEDURE — 94060 EVALUATION OF WHEEZING: CPT

## 2023-11-16 PROCEDURE — 94726 PLETHYSMOGRAPHY LUNG VOLUMES: CPT

## 2023-11-16 PROCEDURE — 94729 DIFFUSING CAPACITY: CPT

## 2023-11-21 ENCOUNTER — TELEPHONE (OUTPATIENT)
Dept: UROLOGY | Age: 68
End: 2023-11-21

## 2023-11-21 NOTE — TELEPHONE ENCOUNTER
Radiology wanted to clarify ext [id number]    Patient is scheduled for ct on 11/24/2023. She had a ct on 11/12/2023. Does this need repeated?

## 2023-11-21 NOTE — TELEPHONE ENCOUNTER
Per Lizzie Henry CNP, Has ct scheduled for 11/9  Needs canceled and rescheduled for 3-4 wks. Can cancel CT.  Reschedule for 3-4 weeks

## 2023-11-22 ENCOUNTER — OFFICE VISIT (OUTPATIENT)
Dept: PULMONOLOGY | Age: 68
End: 2023-11-22
Payer: MEDICARE

## 2023-11-22 VITALS
TEMPERATURE: 97.9 F | HEART RATE: 63 BPM | BODY MASS INDEX: 27.1 KG/M2 | DIASTOLIC BLOOD PRESSURE: 65 MMHG | WEIGHT: 158.7 LBS | OXYGEN SATURATION: 97 % | SYSTOLIC BLOOD PRESSURE: 122 MMHG | HEIGHT: 64 IN

## 2023-11-22 DIAGNOSIS — R94.2 MIXED OBSTRUCTIVE AND RESTRICTIVE VENTILATORY DEFECT: Primary | ICD-10-CM

## 2023-11-22 DIAGNOSIS — Z87.891 PERSONAL HISTORY OF TOBACCO USE: ICD-10-CM

## 2023-11-22 PROCEDURE — G0296 VISIT TO DETERM LDCT ELIG: HCPCS | Performed by: INTERNAL MEDICINE

## 2023-11-22 PROCEDURE — 3017F COLORECTAL CA SCREEN DOC REV: CPT | Performed by: INTERNAL MEDICINE

## 2023-11-22 PROCEDURE — 99214 OFFICE O/P EST MOD 30 MIN: CPT | Performed by: INTERNAL MEDICINE

## 2023-11-22 PROCEDURE — 4004F PT TOBACCO SCREEN RCVD TLK: CPT | Performed by: INTERNAL MEDICINE

## 2023-11-22 PROCEDURE — G8427 DOCREV CUR MEDS BY ELIG CLIN: HCPCS | Performed by: INTERNAL MEDICINE

## 2023-11-22 PROCEDURE — 3078F DIAST BP <80 MM HG: CPT | Performed by: INTERNAL MEDICINE

## 2023-11-22 PROCEDURE — G8400 PT W/DXA NO RESULTS DOC: HCPCS | Performed by: INTERNAL MEDICINE

## 2023-11-22 PROCEDURE — 1111F DSCHRG MED/CURRENT MED MERGE: CPT | Performed by: INTERNAL MEDICINE

## 2023-11-22 PROCEDURE — G8417 CALC BMI ABV UP PARAM F/U: HCPCS | Performed by: INTERNAL MEDICINE

## 2023-11-22 PROCEDURE — 3074F SYST BP LT 130 MM HG: CPT | Performed by: INTERNAL MEDICINE

## 2023-11-22 PROCEDURE — 1123F ACP DISCUSS/DSCN MKR DOCD: CPT | Performed by: INTERNAL MEDICINE

## 2023-11-22 PROCEDURE — 1090F PRES/ABSN URINE INCON ASSESS: CPT | Performed by: INTERNAL MEDICINE

## 2023-11-22 PROCEDURE — G8484 FLU IMMUNIZE NO ADMIN: HCPCS | Performed by: INTERNAL MEDICINE

## 2023-11-22 ASSESSMENT — ENCOUNTER SYMPTOMS
CHEST TIGHTNESS: 1
SHORTNESS OF BREATH: 1
VOICE CHANGE: 1
WHEEZING: 1
COUGH: 1

## 2023-11-26 ENCOUNTER — APPOINTMENT (OUTPATIENT)
Dept: CT IMAGING | Age: 68
End: 2023-11-26
Payer: MEDICARE

## 2023-11-26 ENCOUNTER — HOSPITAL ENCOUNTER (EMERGENCY)
Age: 68
Discharge: HOME | End: 2023-11-26
Attending: EMERGENCY MEDICINE
Payer: MEDICARE

## 2023-11-26 VITALS
RESPIRATION RATE: 18 BRPM | HEART RATE: 65 BPM | WEIGHT: 160 LBS | HEIGHT: 64 IN | OXYGEN SATURATION: 96 % | DIASTOLIC BLOOD PRESSURE: 74 MMHG | BODY MASS INDEX: 27.31 KG/M2 | TEMPERATURE: 97.6 F | SYSTOLIC BLOOD PRESSURE: 163 MMHG

## 2023-11-26 DIAGNOSIS — T83.022A: Primary | ICD-10-CM

## 2023-11-26 DIAGNOSIS — N39.0 URINARY TRACT INFECTION IN FEMALE: ICD-10-CM

## 2023-11-26 LAB
ALBUMIN SERPL BCG-MCNC: 3.8 G/DL (ref 3.5–5.1)
ALP SERPL-CCNC: 86 U/L (ref 38–126)
ALT SERPL W/O P-5'-P-CCNC: 34 U/L (ref 11–66)
ANION GAP SERPL CALC-SCNC: 9 MEQ/L (ref 8–16)
AST SERPL-CCNC: 33 U/L (ref 5–40)
BACTERIA URNS QL MICRO: ABNORMAL /HPF
BASOPHILS ABSOLUTE: 0 THOU/MM3 (ref 0–0.1)
BASOPHILS NFR BLD AUTO: 0.6 %
BILIRUB SERPL-MCNC: 0.4 MG/DL (ref 0.3–1.2)
BILIRUB UR QL STRIP.AUTO: NEGATIVE
BUN SERPL-MCNC: 15 MG/DL (ref 7–22)
CALCIUM SERPL-MCNC: 10.6 MG/DL (ref 8.5–10.5)
CASTS #/AREA URNS LPF: ABNORMAL /LPF
CASTS 2: ABNORMAL /LPF
CHARACTER UR: CLEAR
CHLORIDE SERPL-SCNC: 101 MEQ/L (ref 98–111)
CO2 SERPL-SCNC: 26 MEQ/L (ref 23–33)
COLOR: YELLOW
CREAT SERPL-MCNC: 0.7 MG/DL (ref 0.4–1.2)
CRYSTALS URNS MICRO: ABNORMAL
DEPRECATED RDW RBC AUTO: 58.7 FL (ref 35–45)
EOSINOPHIL NFR BLD AUTO: 2.8 %
EOSINOPHILS ABSOLUTE: 0.2 THOU/MM3 (ref 0–0.4)
EPITHELIAL CELLS, UA: ABNORMAL /HPF
ERYTHROCYTE [DISTWIDTH] IN BLOOD BY AUTOMATED COUNT: 19.2 % (ref 11.5–14.5)
GFR SERPL CREATININE-BSD FRML MDRD: > 60 ML/MIN/1.73M2
GLUCOSE SERPL-MCNC: 120 MG/DL (ref 70–108)
GLUCOSE UR QL STRIP.AUTO: NEGATIVE MG/DL
HCT VFR BLD AUTO: 37.4 % (ref 37–47)
HGB BLD-MCNC: 11.3 GM/DL (ref 12–16)
HGB UR QL STRIP.AUTO: NEGATIVE
IMM GRANULOCYTES # BLD AUTO: 0.05 THOU/MM3 (ref 0–0.07)
IMM GRANULOCYTES NFR BLD AUTO: 0.6 %
KETONES UR QL STRIP.AUTO: NEGATIVE
LYMPHOCYTES ABSOLUTE: 2.3 THOU/MM3 (ref 1–4.8)
LYMPHOCYTES NFR BLD AUTO: 28.8 %
MCH RBC QN AUTO: 25.6 PG (ref 26–33)
MCHC RBC AUTO-ENTMCNC: 30.2 GM/DL (ref 32.2–35.5)
MCV RBC AUTO: 84.6 FL (ref 81–99)
MISCELLANEOUS 2: ABNORMAL
MONOCYTES ABSOLUTE: 0.5 THOU/MM3 (ref 0.4–1.3)
MONOCYTES NFR BLD AUTO: 6 %
NEUTROPHILS NFR BLD AUTO: 61.2 %
NITRITE UR QL STRIP: NEGATIVE
NRBC BLD AUTO-RTO: 0 /100 WBC
OSMOLALITY SERPL CALC.SUM OF ELEC: 274 MOSMOL/KG (ref 275–300)
PH UR STRIP.AUTO: 6.5 [PH] (ref 5–9)
PLATELET # BLD AUTO: 327 THOU/MM3 (ref 130–400)
PMV BLD AUTO: 8.7 FL (ref 9.4–12.4)
POTASSIUM SERPL-SCNC: 4.5 MEQ/L (ref 3.5–5.2)
PROT SERPL-MCNC: 7.6 G/DL (ref 6.1–8)
PROT UR STRIP.AUTO-MCNC: NEGATIVE MG/DL
RBC # BLD AUTO: 4.42 MILL/MM3 (ref 4.2–5.4)
RBC URINE: ABNORMAL /HPF
REASON FOR REJECTION: NORMAL
REJECTED TEST: NORMAL
RENAL EPI CELLS #/AREA URNS HPF: ABNORMAL /[HPF]
SEGMENTED NEUTROPHILS ABSOLUTE COUNT: 5 THOU/MM3 (ref 1.8–7.7)
SODIUM SERPL-SCNC: 136 MEQ/L (ref 135–145)
SP GR UR REFRACT.AUTO: 1.01 (ref 1–1.03)
UROBILINOGEN, URINE: 1 EU/DL (ref 0–1)
WBC # BLD AUTO: 8.1 THOU/MM3 (ref 4.8–10.8)
WBC #/AREA URNS HPF: ABNORMAL /HPF
WBC #/AREA URNS HPF: ABNORMAL /[HPF]
YEAST LIKE FUNGI URNS QL MICRO: ABNORMAL

## 2023-11-26 PROCEDURE — 85025 COMPLETE CBC W/AUTO DIFF WBC: CPT

## 2023-11-26 PROCEDURE — 36415 COLL VENOUS BLD VENIPUNCTURE: CPT

## 2023-11-26 PROCEDURE — 74177 CT ABD & PELVIS W/CONTRAST: CPT

## 2023-11-26 PROCEDURE — 80053 COMPREHEN METABOLIC PANEL: CPT

## 2023-11-26 PROCEDURE — 99285 EMERGENCY DEPT VISIT HI MDM: CPT

## 2023-11-26 PROCEDURE — 81001 URINALYSIS AUTO W/SCOPE: CPT

## 2023-11-26 PROCEDURE — 6360000004 HC RX CONTRAST MEDICATION: Performed by: EMERGENCY MEDICINE

## 2023-11-26 RX ADMIN — IOPAMIDOL 80 ML: 755 INJECTION, SOLUTION INTRAVENOUS at 10:23

## 2023-11-26 ASSESSMENT — PAIN SCALES - GENERAL
PAINLEVEL_OUTOF10: 8
PAINLEVEL_OUTOF10: 8

## 2023-11-26 ASSESSMENT — PAIN DESCRIPTION - FREQUENCY: FREQUENCY: CONTINUOUS

## 2023-11-26 ASSESSMENT — PAIN DESCRIPTION - ORIENTATION: ORIENTATION: LEFT

## 2023-11-26 ASSESSMENT — PAIN - FUNCTIONAL ASSESSMENT
PAIN_FUNCTIONAL_ASSESSMENT: 0-10
PAIN_FUNCTIONAL_ASSESSMENT: 0-10

## 2023-11-26 ASSESSMENT — PAIN DESCRIPTION - LOCATION: LOCATION: FLANK

## 2023-11-26 ASSESSMENT — PAIN DESCRIPTION - ONSET: ONSET: ON-GOING

## 2023-11-26 NOTE — ED NOTES
Report called to [facility] by this RN to discuss d/c instructions     Patricia Strange RN  11/26/23 3399

## 2023-11-26 NOTE — ED TRIAGE NOTES
Pt presents to ED via EMS for evaluation of tube removal. Pt states she is unsure how it came out, or when it came out. Tube was from left flank area. Current pain 8/10. Pt denies medications prior to arrival. Pt denies CP or SOB. Vitals obtained at this time.

## 2023-11-26 NOTE — ED NOTES
Patient resting in bed. Updated on plan of care. Denies any needs. Call light in reach.       Jose Manuel Michael RN  11/26/23 2891

## 2023-11-26 NOTE — ED NOTES
Report received from [facility]. Upon first contact, pt resting in bed watching tv. No needs expressed at this time.  [place], [place]  11/26/23 1121

## 2023-11-26 NOTE — ED PROVIDER NOTES
disease, Pneumonia, Seasonal allergies, Sexual problems, Suicidal thoughts, Thyroid disease, Urinary incontinence, Vomiting, Wears dentures, and Wears glasses. SURGICAL HISTORY      has a past surgical history that includes Mandible fracture surgery (1984); shoulder surgery (2014); Colonoscopy (2011); Dilatation, esophagus; Elbow surgery (Right, 10/7/2004); Rotator cuff repair (2004, 2014); skin biopsy (2006); Cholecystectomy, laparoscopic (6/12/15); Elbow surgery (Bilateral, 2016); Carpal tunnel release (Bilateral, 2016); Hysterectomy (1998); cystoscopy w biopsy of bladder (N/A, 7/13/2020); Stimulator Surgery (N/A, 11/4/2020); Stimulator Surgery (N/A, 11/23/2020); Abdomen surgery; Cystoscopy (N/A, 2/10/2022); Cardiac surgery; Stimulator Surgery (N/A, 4/14/2022); EGD; Cystoscopy (N/A, 12/27/2022); Ureter surgery (Left, 1/12/2023); and Cystoscopy (N/A, 8/22/2023).     CURRENT MEDICATIONS       Discharge Medication List as of 11/26/2023 12:09 PM        CONTINUE these medications which have NOT CHANGED    Details   amoxicillin-clavulanate (AUGMENTIN) 875-125 MG per tablet Take 1 tablet by mouth every 12 hours for 41 doses, Disp-41 tablet, R-0Normal      vitamin B-6 (B-6) 50 MG tablet Take 1 tablet by mouth daily, Disp-30 tablet, R-3Normal      !! Catheters (BARDIA URETHRAL CATHETER 16FR) MISC 1 each by Does not apply route as needed (see) 1 16 fr catheter to be inserted may irrigate as needed with 100ml ns or sterile water daily and prn occlusion   may use any supply vendor, Disp-1 each, R-0Normal      Water For Irrigation, Sterile (STERILE WATER FOR IRRIGATION) Disp-500 mL, R-3, NormalIrrigate with 100 ml ns or sterile water daily and prn for occlusion      Incontinence Supplies (Yumber BASIC IRRIGATION TRAY) KIT AS NEEDED Starting Fri 9/15/2023, Disp-16 kit, R-3, Normal      Incontinence Supplies (URINARY DRAINAGE BAG) MISC AS NEEDED Starting Fri 9/15/2023, Disp-1 each, R-0, [id number] drainage bag for urinary

## 2023-11-26 NOTE — ED NOTES
Attempted to call [facility] with report, no answer at this time     Cher Fischer  11/26/23 122

## 2023-11-26 NOTE — ED NOTES
Patient resting in bed. Updated on results and plan of care. Denies any needs. Call light in reach.       Bj Kaye RN  11/26/23 9009

## 2023-11-26 NOTE — ED NOTES
Patient resting in bed. Updated on pending CT scan. Call light in reach.       Murray Hong RN  11/26/23 9256

## 2023-11-27 ENCOUNTER — NURSE ONLY (OUTPATIENT)
Dept: LAB | Age: 68
End: 2023-11-27

## 2023-11-27 LAB
25(OH)D3 SERPL-MCNC: 36 NG/ML (ref 30–100)
ANION GAP SERPL CALC-SCNC: 10 MEQ/L (ref 8–16)
BUN SERPL-MCNC: 12 MG/DL (ref 7–22)
CALCIUM SERPL-MCNC: 10.2 MG/DL (ref 8.5–10.5)
CHLORIDE SERPL-SCNC: 101 MEQ/L (ref 98–111)
CO2 SERPL-SCNC: 26 MEQ/L (ref 23–33)
CREAT SERPL-MCNC: 0.6 MG/DL (ref 0.4–1.2)
DEPRECATED RDW RBC AUTO: 61 FL (ref 35–45)
ERYTHROCYTE [DISTWIDTH] IN BLOOD BY AUTOMATED COUNT: 19.1 % (ref 11.5–14.5)
GFR SERPL CREATININE-BSD FRML MDRD: > 60 ML/MIN/1.73M2
GLUCOSE SERPL-MCNC: 180 MG/DL (ref 70–108)
HCT VFR BLD AUTO: 38.6 % (ref 37–47)
HGB BLD-MCNC: 11.4 GM/DL (ref 12–16)
MCH RBC QN AUTO: 25.8 PG (ref 26–33)
MCHC RBC AUTO-ENTMCNC: 29.5 GM/DL (ref 32.2–35.5)
MCV RBC AUTO: 87.3 FL (ref 81–99)
PLATELET # BLD AUTO: 328 THOU/MM3 (ref 130–400)
PMV BLD AUTO: 9.6 FL (ref 9.4–12.4)
POTASSIUM SERPL-SCNC: 4.6 MEQ/L (ref 3.5–5.2)
PTH-INTACT SERPL-MCNC: 24.3 PG/ML (ref 15–65)
RBC # BLD AUTO: 4.42 MILL/MM3 (ref 4.2–5.4)
SODIUM SERPL-SCNC: 137 MEQ/L (ref 135–145)
WBC # BLD AUTO: 8.8 THOU/MM3 (ref 4.8–10.8)

## 2023-11-30 ENCOUNTER — HOSPITAL ENCOUNTER (INPATIENT)
Age: 68
LOS: 5 days | Discharge: HOME | DRG: 690 | End: 2023-12-05
Attending: STUDENT IN AN ORGANIZED HEALTH CARE EDUCATION/TRAINING PROGRAM | Admitting: STUDENT IN AN ORGANIZED HEALTH CARE EDUCATION/TRAINING PROGRAM
Payer: MEDICARE

## 2023-11-30 ENCOUNTER — APPOINTMENT (OUTPATIENT)
Dept: CT IMAGING | Age: 68
DRG: 690 | End: 2023-11-30
Payer: MEDICARE

## 2023-11-30 DIAGNOSIS — N15.1 PERINEPHRIC ABSCESS: Primary | ICD-10-CM

## 2023-11-30 PROBLEM — R10.9 ABDOMINAL PAIN: Status: ACTIVE | Noted: 2023-11-30

## 2023-11-30 LAB
ALBUMIN SERPL BCG-MCNC: 3.8 G/DL (ref 3.5–5.1)
ALP SERPL-CCNC: 90 U/L (ref 38–126)
ALT SERPL W/O P-5'-P-CCNC: 29 U/L (ref 11–66)
ANION GAP SERPL CALC-SCNC: 13 MEQ/L (ref 8–16)
AST SERPL-CCNC: 29 U/L (ref 5–40)
BACTERIA URNS QL MICRO: ABNORMAL /HPF
BASOPHILS ABSOLUTE: 0 THOU/MM3 (ref 0–0.1)
BASOPHILS NFR BLD AUTO: 0.4 %
BILIRUB CONJ SERPL-MCNC: 0.3 MG/DL (ref 0–0.3)
BILIRUB SERPL-MCNC: 0.5 MG/DL (ref 0.3–1.2)
BILIRUB UR QL STRIP.AUTO: NEGATIVE
BUN SERPL-MCNC: 12 MG/DL (ref 7–22)
CALCIUM SERPL-MCNC: 10.4 MG/DL (ref 8.5–10.5)
CASTS #/AREA URNS LPF: ABNORMAL /LPF
CASTS 2: ABNORMAL /LPF
CHARACTER UR: CLEAR
CHLORIDE SERPL-SCNC: 101 MEQ/L (ref 98–111)
CO2 SERPL-SCNC: 21 MEQ/L (ref 23–33)
COLOR: YELLOW
CREAT SERPL-MCNC: 0.6 MG/DL (ref 0.4–1.2)
CRYSTALS URNS MICRO: ABNORMAL
DEPRECATED RDW RBC AUTO: 58.4 FL (ref 35–45)
EOSINOPHIL NFR BLD AUTO: 1.2 %
EOSINOPHILS ABSOLUTE: 0.1 THOU/MM3 (ref 0–0.4)
EPITHELIAL CELLS, UA: ABNORMAL /HPF
ERYTHROCYTE [DISTWIDTH] IN BLOOD BY AUTOMATED COUNT: 18.7 % (ref 11.5–14.5)
GFR SERPL CREATININE-BSD FRML MDRD: > 60 ML/MIN/1.73M2
GLUCOSE SERPL-MCNC: 166 MG/DL (ref 70–108)
GLUCOSE UR QL STRIP.AUTO: NEGATIVE MG/DL
HCT VFR BLD AUTO: 37.9 % (ref 37–47)
HGB BLD-MCNC: 11.3 GM/DL (ref 12–16)
HGB UR QL STRIP.AUTO: NEGATIVE
IMM GRANULOCYTES # BLD AUTO: 0.08 THOU/MM3 (ref 0–0.07)
IMM GRANULOCYTES NFR BLD AUTO: 0.7 %
KETONES UR QL STRIP.AUTO: NEGATIVE
LACTIC ACID, SEPSIS: 1.5 MMOL/L (ref 0.5–1.9)
LIPASE SERPL-CCNC: 23.5 U/L (ref 5.6–51.3)
LYMPHOCYTES ABSOLUTE: 2.6 THOU/MM3 (ref 1–4.8)
LYMPHOCYTES NFR BLD AUTO: 21.6 %
MCH RBC QN AUTO: 25.5 PG (ref 26–33)
MCHC RBC AUTO-ENTMCNC: 29.8 GM/DL (ref 32.2–35.5)
MCV RBC AUTO: 85.4 FL (ref 81–99)
MISCELLANEOUS 2: ABNORMAL
MONOCYTES ABSOLUTE: 0.9 THOU/MM3 (ref 0.4–1.3)
MONOCYTES NFR BLD AUTO: 7.8 %
NEUTROPHILS NFR BLD AUTO: 68.3 %
NITRITE UR QL STRIP: NEGATIVE
NRBC BLD AUTO-RTO: 0 /100 WBC
NT-PROBNP SERPL IA-MCNC: 544.6 PG/ML (ref 0–124)
OSMOLALITY SERPL CALC.SUM OF ELEC: 273.6 MOSMOL/KG (ref 275–300)
PH UR STRIP.AUTO: 6.5 [PH] (ref 5–9)
PLATELET # BLD AUTO: 317 THOU/MM3 (ref 130–400)
PMV BLD AUTO: 9.6 FL (ref 9.4–12.4)
POTASSIUM SERPL-SCNC: 4.5 MEQ/L (ref 3.5–5.2)
PROT SERPL-MCNC: 7.6 G/DL (ref 6.1–8)
PROT UR STRIP.AUTO-MCNC: NEGATIVE MG/DL
RBC # BLD AUTO: 4.44 MILL/MM3 (ref 4.2–5.4)
RBC URINE: ABNORMAL /HPF
RENAL EPI CELLS #/AREA URNS HPF: ABNORMAL /[HPF]
SEGMENTED NEUTROPHILS ABSOLUTE COUNT: 8.2 THOU/MM3 (ref 1.8–7.7)
SODIUM SERPL-SCNC: 135 MEQ/L (ref 135–145)
SP GR UR REFRACT.AUTO: 1.02 (ref 1–1.03)
TROPONIN, HIGH SENSITIVITY: 17 NG/L (ref 0–12)
UROBILINOGEN, URINE: 1 EU/DL (ref 0–1)
WBC # BLD AUTO: 12 THOU/MM3 (ref 4.8–10.8)
WBC #/AREA URNS HPF: ABNORMAL /HPF
WBC #/AREA URNS HPF: ABNORMAL /[HPF]
YEAST LIKE FUNGI URNS QL MICRO: ABNORMAL

## 2023-11-30 PROCEDURE — 83605 ASSAY OF LACTIC ACID: CPT

## 2023-11-30 PROCEDURE — 2060000000 HC ICU INTERMEDIATE R&B

## 2023-11-30 PROCEDURE — 87040 BLOOD CULTURE FOR BACTERIA: CPT

## 2023-11-30 PROCEDURE — 83690 ASSAY OF LIPASE: CPT

## 2023-11-30 PROCEDURE — 2580000003 HC RX 258

## 2023-11-30 PROCEDURE — 6360000002 HC RX W HCPCS: Performed by: EMERGENCY MEDICINE

## 2023-11-30 PROCEDURE — 2580000003 HC RX 258: Performed by: EMERGENCY MEDICINE

## 2023-11-30 PROCEDURE — 83880 ASSAY OF NATRIURETIC PEPTIDE: CPT

## 2023-11-30 PROCEDURE — 36415 COLL VENOUS BLD VENIPUNCTURE: CPT

## 2023-11-30 PROCEDURE — 6360000004 HC RX CONTRAST MEDICATION: Performed by: EMERGENCY MEDICINE

## 2023-11-30 PROCEDURE — 99222 1ST HOSP IP/OBS MODERATE 55: CPT | Performed by: NURSE PRACTITIONER

## 2023-11-30 PROCEDURE — 74177 CT ABD & PELVIS W/CONTRAST: CPT

## 2023-11-30 PROCEDURE — 99223 1ST HOSP IP/OBS HIGH 75: CPT | Performed by: STUDENT IN AN ORGANIZED HEALTH CARE EDUCATION/TRAINING PROGRAM

## 2023-11-30 PROCEDURE — 6360000002 HC RX W HCPCS

## 2023-11-30 PROCEDURE — 80048 BASIC METABOLIC PNL TOTAL CA: CPT

## 2023-11-30 PROCEDURE — 81001 URINALYSIS AUTO W/SCOPE: CPT

## 2023-11-30 PROCEDURE — 99285 EMERGENCY DEPT VISIT HI MDM: CPT

## 2023-11-30 PROCEDURE — 80076 HEPATIC FUNCTION PANEL: CPT

## 2023-11-30 PROCEDURE — 6370000000 HC RX 637 (ALT 250 FOR IP)

## 2023-11-30 PROCEDURE — 85025 COMPLETE CBC W/AUTO DIFF WBC: CPT

## 2023-11-30 PROCEDURE — 84484 ASSAY OF TROPONIN QUANT: CPT

## 2023-11-30 RX ORDER — ENOXAPARIN SODIUM 100 MG/ML
40 INJECTION SUBCUTANEOUS DAILY
Status: DISCONTINUED | OUTPATIENT
Start: 2023-11-30 | End: 2023-12-05 | Stop reason: HOSPADM

## 2023-11-30 RX ORDER — TRAZODONE HYDROCHLORIDE 100 MG/1
200 TABLET ORAL NIGHTLY
Status: DISCONTINUED | OUTPATIENT
Start: 2023-12-01 | End: 2023-12-05 | Stop reason: HOSPADM

## 2023-11-30 RX ORDER — ONDANSETRON 2 MG/ML
4 INJECTION INTRAMUSCULAR; INTRAVENOUS EVERY 6 HOURS PRN
Status: DISCONTINUED | OUTPATIENT
Start: 2023-11-30 | End: 2023-12-05 | Stop reason: HOSPADM

## 2023-11-30 RX ORDER — ACETAMINOPHEN 325 MG/1
650 TABLET ORAL EVERY 6 HOURS PRN
Status: DISCONTINUED | OUTPATIENT
Start: 2023-11-30 | End: 2023-12-01

## 2023-11-30 RX ORDER — FERROUS SULFATE 325(65) MG
325 TABLET ORAL 2 TIMES DAILY
Status: DISCONTINUED | OUTPATIENT
Start: 2023-11-30 | End: 2023-12-05 | Stop reason: HOSPADM

## 2023-11-30 RX ORDER — ESCITALOPRAM OXALATE 20 MG/1
20 TABLET ORAL DAILY
Status: DISCONTINUED | OUTPATIENT
Start: 2023-11-30 | End: 2023-12-05 | Stop reason: HOSPADM

## 2023-11-30 RX ORDER — ACETAMINOPHEN 650 MG/1
650 SUPPOSITORY RECTAL EVERY 6 HOURS PRN
Status: DISCONTINUED | OUTPATIENT
Start: 2023-11-30 | End: 2023-12-01

## 2023-11-30 RX ORDER — ALBUTEROL SULFATE 2.5 MG/3ML
2.5 SOLUTION RESPIRATORY (INHALATION) EVERY 6 HOURS PRN
Status: DISCONTINUED | OUTPATIENT
Start: 2023-11-30 | End: 2023-12-05 | Stop reason: HOSPADM

## 2023-11-30 RX ORDER — MAGNESIUM SULFATE IN WATER 40 MG/ML
2000 INJECTION, SOLUTION INTRAVENOUS PRN
Status: DISCONTINUED | OUTPATIENT
Start: 2023-11-30 | End: 2023-12-05 | Stop reason: HOSPADM

## 2023-11-30 RX ORDER — BUPROPION HYDROCHLORIDE 150 MG/1
150 TABLET ORAL EVERY MORNING
Status: DISCONTINUED | OUTPATIENT
Start: 2023-12-01 | End: 2023-12-05 | Stop reason: HOSPADM

## 2023-11-30 RX ORDER — CARVEDILOL 3.12 MG/1
3.12 TABLET ORAL 2 TIMES DAILY
Status: DISCONTINUED | OUTPATIENT
Start: 2023-11-30 | End: 2023-12-05 | Stop reason: HOSPADM

## 2023-11-30 RX ORDER — SODIUM CHLORIDE 0.9 % (FLUSH) 0.9 %
5-40 SYRINGE (ML) INJECTION EVERY 12 HOURS SCHEDULED
Status: DISCONTINUED | OUTPATIENT
Start: 2023-11-30 | End: 2023-12-05 | Stop reason: HOSPADM

## 2023-11-30 RX ORDER — FOLIC ACID 1 MG/1
1 TABLET ORAL DAILY
Status: DISCONTINUED | OUTPATIENT
Start: 2023-11-30 | End: 2023-12-05 | Stop reason: HOSPADM

## 2023-11-30 RX ORDER — PANTOPRAZOLE SODIUM 40 MG/1
40 TABLET, DELAYED RELEASE ORAL
Status: DISCONTINUED | OUTPATIENT
Start: 2023-12-01 | End: 2023-12-05 | Stop reason: HOSPADM

## 2023-11-30 RX ORDER — SODIUM CHLORIDE 0.9 % (FLUSH) 0.9 %
10 SYRINGE (ML) INJECTION PRN
Status: DISCONTINUED | OUTPATIENT
Start: 2023-11-30 | End: 2023-12-05 | Stop reason: HOSPADM

## 2023-11-30 RX ORDER — LEVOTHYROXINE SODIUM 0.07 MG/1
75 TABLET ORAL DAILY
Status: DISCONTINUED | OUTPATIENT
Start: 2023-12-01 | End: 2023-12-05 | Stop reason: HOSPADM

## 2023-11-30 RX ORDER — LANOLIN ALCOHOL/MO/W.PET/CERES
50 CREAM (GRAM) TOPICAL DAILY
Status: DISCONTINUED | OUTPATIENT
Start: 2023-12-01 | End: 2023-12-05 | Stop reason: HOSPADM

## 2023-11-30 RX ORDER — ONDANSETRON 4 MG/1
4 TABLET, ORALLY DISINTEGRATING ORAL EVERY 8 HOURS PRN
Status: DISCONTINUED | OUTPATIENT
Start: 2023-11-30 | End: 2023-12-05 | Stop reason: HOSPADM

## 2023-11-30 RX ORDER — LIDOCAINE 4 G/G
1 PATCH TOPICAL EVERY 24 HOURS
Status: DISCONTINUED | OUTPATIENT
Start: 2023-12-01 | End: 2023-12-05 | Stop reason: HOSPADM

## 2023-11-30 RX ORDER — DOCUSATE SODIUM 100 MG/1
100 CAPSULE, LIQUID FILLED ORAL 2 TIMES DAILY PRN
Status: DISCONTINUED | OUTPATIENT
Start: 2023-11-30 | End: 2023-12-05 | Stop reason: HOSPADM

## 2023-11-30 RX ORDER — FENOFIBRATE 160 MG/1
160 TABLET ORAL DAILY
Status: DISCONTINUED | OUTPATIENT
Start: 2023-12-01 | End: 2023-12-05 | Stop reason: HOSPADM

## 2023-11-30 RX ORDER — POTASSIUM CHLORIDE 20 MEQ/1
40 TABLET, EXTENDED RELEASE ORAL PRN
Status: DISCONTINUED | OUTPATIENT
Start: 2023-11-30 | End: 2023-12-05 | Stop reason: HOSPADM

## 2023-11-30 RX ORDER — POTASSIUM CHLORIDE 20 MEQ/1
20 TABLET, EXTENDED RELEASE ORAL DAILY
Status: DISCONTINUED | OUTPATIENT
Start: 2023-12-01 | End: 2023-12-05 | Stop reason: HOSPADM

## 2023-11-30 RX ORDER — SODIUM CHLORIDE 9 MG/ML
INJECTION, SOLUTION INTRAVENOUS PRN
Status: DISCONTINUED | OUTPATIENT
Start: 2023-11-30 | End: 2023-12-05 | Stop reason: HOSPADM

## 2023-11-30 RX ORDER — POLYETHYLENE GLYCOL 3350 17 G/17G
17 POWDER, FOR SOLUTION ORAL DAILY PRN
Status: DISCONTINUED | OUTPATIENT
Start: 2023-11-30 | End: 2023-12-05 | Stop reason: HOSPADM

## 2023-11-30 RX ORDER — POTASSIUM CHLORIDE 7.45 MG/ML
10 INJECTION INTRAVENOUS PRN
Status: DISCONTINUED | OUTPATIENT
Start: 2023-11-30 | End: 2023-12-05 | Stop reason: HOSPADM

## 2023-11-30 RX ORDER — KETOROLAC TROMETHAMINE 30 MG/ML
30 INJECTION, SOLUTION INTRAMUSCULAR; INTRAVENOUS ONCE
Status: COMPLETED | OUTPATIENT
Start: 2023-12-01 | End: 2023-12-01

## 2023-11-30 RX ORDER — ATORVASTATIN CALCIUM 40 MG/1
40 TABLET, FILM COATED ORAL NIGHTLY
Status: DISCONTINUED | OUTPATIENT
Start: 2023-11-30 | End: 2023-12-05 | Stop reason: HOSPADM

## 2023-11-30 RX ADMIN — ACETAMINOPHEN 650 MG: 325 TABLET ORAL at 20:21

## 2023-11-30 RX ADMIN — IOPAMIDOL 80 ML: 755 INJECTION, SOLUTION INTRAVENOUS at 14:37

## 2023-11-30 RX ADMIN — ATORVASTATIN CALCIUM 40 MG: 40 TABLET, FILM COATED ORAL at 20:32

## 2023-11-30 RX ADMIN — ESCITALOPRAM OXALATE 20 MG: 20 TABLET, FILM COATED ORAL at 20:32

## 2023-11-30 RX ADMIN — Medication 1500 MG: at 20:26

## 2023-11-30 RX ADMIN — PIPERACILLIN SODIUM AND TAZOBACTAM SODIUM 3375 MG: 3; .375 INJECTION, POWDER, LYOPHILIZED, FOR SOLUTION INTRAVENOUS at 23:26

## 2023-11-30 RX ADMIN — CARVEDILOL 3.12 MG: 6.25 TABLET, FILM COATED ORAL at 20:32

## 2023-11-30 RX ADMIN — SODIUM CHLORIDE, PRESERVATIVE FREE 10 ML: 5 INJECTION INTRAVENOUS at 20:27

## 2023-11-30 RX ADMIN — PIPERACILLIN AND TAZOBACTAM 4500 MG: 4; .5 INJECTION, POWDER, LYOPHILIZED, FOR SOLUTION INTRAVENOUS at 16:57

## 2023-11-30 ASSESSMENT — PAIN SCALES - GENERAL
PAINLEVEL_OUTOF10: 8
PAINLEVEL_OUTOF10: 10
PAINLEVEL_OUTOF10: 8
PAINLEVEL_OUTOF10: 7
PAINLEVEL_OUTOF10: 8
PAINLEVEL_OUTOF10: 8

## 2023-11-30 ASSESSMENT — PAIN - FUNCTIONAL ASSESSMENT
PAIN_FUNCTIONAL_ASSESSMENT: ACTIVITIES ARE NOT PREVENTED
PAIN_FUNCTIONAL_ASSESSMENT: ACTIVITIES ARE NOT PREVENTED
PAIN_FUNCTIONAL_ASSESSMENT: 0-10

## 2023-11-30 ASSESSMENT — PAIN DESCRIPTION - ONSET
ONSET: ON-GOING

## 2023-11-30 ASSESSMENT — PAIN DESCRIPTION - ORIENTATION
ORIENTATION: LEFT

## 2023-11-30 ASSESSMENT — PAIN DESCRIPTION - LOCATION
LOCATION: FLANK
LOCATION: ABDOMEN
LOCATION: FLANK
LOCATION: ABDOMEN
LOCATION: FLANK

## 2023-11-30 ASSESSMENT — PAIN DESCRIPTION - FREQUENCY
FREQUENCY: CONTINUOUS

## 2023-11-30 ASSESSMENT — PAIN DESCRIPTION - DESCRIPTORS
DESCRIPTORS: ACHING;SHARP
DESCRIPTORS: ACHING
DESCRIPTORS: ACHING;SHOOTING
DESCRIPTORS: ACHING

## 2023-11-30 ASSESSMENT — PAIN DESCRIPTION - PAIN TYPE
TYPE: CHRONIC PAIN
TYPE: ACUTE PAIN
TYPE: ACUTE PAIN

## 2023-11-30 NOTE — CONSULTS
Urology Consult Note      Reason for Consult:  Perinephric abscess  Requesting Physician:  Dr Angelica Galvin    History Obtained From:  patient    Chief Complaint: L flank pain    HISTORY OF PRESENT ILLNESS:                The patient is a 79 y.o. female who presented with L flank pain and generally not feeling well. She had recent hospitalization in October for perinephric abscess. IR drain was placed on 10/30/23 with improvement in size of abscess. Drain inadvertently displaced on 11/26. Since that time she has experienced increased pain and reports today she is generally not feeling well. Having chilling without fever. WBC 12. CRT 1. Lactate normal.  CT with left renal abscess measuring 2.8 x 4.6 x 5.8 cms and left posterior perinephric abscess increased in size at 3.2 x 5.5 x 4.4 cms in size (was 1.1 x 4.4 x 2.6 cms on 11/26). Hx of urge incontinence treated with bladder botox injections with last injection by Dr. Lucia Morales in 8/2023. Hx of renal hematoma following L ureteroscopic stone treatment in January 2023. Urine culture 10/29 with Klebsiella and Proteus. Drain fluid culture 10/30/23 with Proteus.       Past Medical History:        Diagnosis Date    Allergic rhinitis     Arthritis     back, arms, hips    Bipolar 1 disorder ([address])     Cancer ([address]) 2011    cancerous polyps removed - Dr. Italia Leiva    Cataract     Colon polyps     COPD (chronic obstructive pulmonary disease) ([address]) 07/24/2014    Coronary vasospasm ([address]) 08/17/2019    Depression     Diverticulitis of colon     GERD (gastroesophageal reflux disease)     Glaucoma     Hearing loss     Hiatal hernia     History of arterial ischemic stroke 07/20/2019    History of colonoscopy 2002    History of kidney stones     Hx of blood clots 06/17/2014    PE and collar bone area after shoulder surgery    Hyperlipidemia     Hypertension     Liver disease     enlarged liver - damaged with alcohol in past but no cirrhosis per patient    Pneumonia

## 2023-11-30 NOTE — ED PROVIDER NOTES
(*)     Glucose 166 (*)     All other components within normal limits   BRAIN NATRIURETIC PEPTIDE - Abnormal; Notable for the following components:    Pro-.6 (*)     All other components within normal limits   CBC WITH AUTO DIFFERENTIAL - Abnormal; Notable for the following components:    WBC 12.0 (*)     Hemoglobin 11.3 (*)     MCH 25.5 (*)     MCHC 29.8 (*)     RDW-CV 18.7 (*)     RDW-SD 58.4 (*)     Segs Absolute 8.2 (*)     Immature Grans (Abs) 0.08 (*)     All other components within normal limits   TROPONIN - Abnormal; Notable for the following components:    Troponin, High Sensitivity 17 (*)     All other components within normal limits   OSMOLALITY - Abnormal; Notable for the following components:    Osmolality Calc 273.6 (*)     All other components within normal limits   CULTURE, BLOOD 1   CULTURE, BLOOD 1   CULTURE, URINE   LIPASE   LACTATE, SEPSIS   HEPATIC FUNCTION PANEL   ANION GAP   GLOMERULAR FILTRATION RATE, ESTIMATED   URINALYSIS WITH REFLEX TO CULTURE       (Any cultures that may have been sent were not resulted at the time of this patient visit)    MEDICAL DECISION MAKING     1) Number and Complexity of Problems            Problem List This Visit:         Chief Complaint   Patient presents with    Abdominal Pain          Differential Diagnosis includes (but not limited to):    Perirenal abscess, GERD, PUD, pancreatitis, cholecystitis, GB colic, cholangitis, Esio-Kfsv-Dwgozu, SBO, DKA, AAA, mesenteric ischemia, perforated viscous, acute gastroenteritis, NSAP, pyelonephritis, kidney stone, appendicitis, hernia, UTI, constipation, ectopic, ovarian torsion, ovarian cyst, PID, tuboovarian abscess, period/ fibroid               Pertinent Comorbid Conditions:        2)  Data Reviewed (none if blank or additional interpretation can be found in ED Course)          My Independent interpretations:     EKG:          Imaging: CT abdomen showing increase in size of left subcapsular renal abscess and

## 2023-11-30 NOTE — H&P
antipsychotic medications. Other:  - Patient is on home aspirin 81 mg daily for an unspecified reason. We will hold this in case Urology decides to do a Urologic procedure in the near future. History Of Present Illness:    Shirley Vidal is a 79 y.o. female who presents to [facility] ED 11/30/2023 with abdomen pain. Patient is a current smoker (1 PPD for 46 years) with a PMH significant for recently diagnosed L perinephric abscess, kidney stones, blood clots, COPD, hypothyroidism. The patient was able to answer questions coherently, but is somewhat of a poor historian. The patient states that she has had abdominal pain for the past 2 weeks. She accidentally pulled out her percutaneous drainage tube on 11/27/23 per chart review. She describes her pain as left-sided, constant, located around the abdomen/flank/back, and rated an 8.5/10 in severity. She denies anything that improves or worsens the pain. She denied any pain or burning with urination when I talked with her, although she mentioned having those symptoms with Dr. Lizette Scott. She had a fever and an episode of vomiting food earlier, and still feels some nausea. She reports minimal intermittent and sporadic chest discomfort. She denies any headache, dizziness, or any other symptoms. She denies any smoking, drinking, or illicit drug use. Patient is a FULL code. ED Course:  Patient Vitals on Arrival T 97.8F RR 22 HR 71 /68 SpO2 97% on RA. Labwork showed BMP with Na 135, K 4.1, Cl 101, CO2 21, BUN 12, Cr 0.6, glucose 166. LFTs WNL. Pro-.6, Troponin 17. CBC showed WBC 12.0, Hgb 11.3, Hct 37.9, platelet 203. CT Abdomen Pelvis W Contrast:  The subcapsular abscess in the left kidney and the abscess in the left posterior perinephric space are both larger with measurements provided in the discussion. No obstructive uropathy is observed.  Mass effect on the left kidney is unchanged and the nonobstructing left nephrolithiasis appears

## 2023-11-30 NOTE — ED NOTES
Patient presents to the ED via EMS from [facility] for concerns of left side abdominal pain that has been ongoing. Patient was seen here about 4 days ago for the same thing. She recently had a abdominal drain placed for abscess; medics were reported from [address] [place] [address] Road that patient either pulled the drain out or it was pulled. She arrives today with no drain. Incision site intact and no swelling or redness. Patient rates pain at a 8/10. She has taken tylenol with no relief.       Bria Edge  11/30/23 0921

## 2023-12-01 ENCOUNTER — APPOINTMENT (OUTPATIENT)
Dept: CT IMAGING | Age: 68
DRG: 690 | End: 2023-12-01
Payer: MEDICARE

## 2023-12-01 LAB
ANION GAP SERPL CALC-SCNC: 8 MEQ/L (ref 8–16)
BASOPHILS ABSOLUTE: 0.1 THOU/MM3 (ref 0–0.1)
BASOPHILS NFR BLD AUTO: 0.6 %
BUN SERPL-MCNC: 11 MG/DL (ref 7–22)
CALCIUM SERPL-MCNC: 10.2 MG/DL (ref 8.5–10.5)
CHLORIDE SERPL-SCNC: 102 MEQ/L (ref 98–111)
CO2 SERPL-SCNC: 25 MEQ/L (ref 23–33)
CREAT SERPL-MCNC: 0.6 MG/DL (ref 0.4–1.2)
DEPRECATED RDW RBC AUTO: 58.3 FL (ref 35–45)
EOSINOPHIL NFR BLD AUTO: 1.9 %
EOSINOPHILS ABSOLUTE: 0.2 THOU/MM3 (ref 0–0.4)
ERYTHROCYTE [DISTWIDTH] IN BLOOD BY AUTOMATED COUNT: 18.6 % (ref 11.5–14.5)
GFR SERPL CREATININE-BSD FRML MDRD: > 60 ML/MIN/1.73M2
GLUCOSE SERPL-MCNC: 129 MG/DL (ref 70–108)
HCT VFR BLD AUTO: 35.2 % (ref 37–47)
HGB BLD-MCNC: 10.7 GM/DL (ref 12–16)
IMM GRANULOCYTES # BLD AUTO: 0.07 THOU/MM3 (ref 0–0.07)
IMM GRANULOCYTES NFR BLD AUTO: 0.8 %
LYMPHOCYTES ABSOLUTE: 2.1 THOU/MM3 (ref 1–4.8)
LYMPHOCYTES NFR BLD AUTO: 24.6 %
MCH RBC QN AUTO: 25.9 PG (ref 26–33)
MCHC RBC AUTO-ENTMCNC: 30.4 GM/DL (ref 32.2–35.5)
MCV RBC AUTO: 85.2 FL (ref 81–99)
MONOCYTES ABSOLUTE: 0.7 THOU/MM3 (ref 0.4–1.3)
MONOCYTES NFR BLD AUTO: 7.8 %
NEUTROPHILS NFR BLD AUTO: 64.3 %
NRBC BLD AUTO-RTO: 0 /100 WBC
PLATELET # BLD AUTO: 280 THOU/MM3 (ref 130–400)
PMV BLD AUTO: 9.2 FL (ref 9.4–12.4)
POTASSIUM SERPL-SCNC: 4.4 MEQ/L (ref 3.5–5.2)
RBC # BLD AUTO: 4.13 MILL/MM3 (ref 4.2–5.4)
SEGMENTED NEUTROPHILS ABSOLUTE COUNT: 5.4 THOU/MM3 (ref 1.8–7.7)
SODIUM SERPL-SCNC: 135 MEQ/L (ref 135–145)
WBC # BLD AUTO: 8.4 THOU/MM3 (ref 4.8–10.8)

## 2023-12-01 PROCEDURE — 99231 SBSQ HOSP IP/OBS SF/LOW 25: CPT | Performed by: UROLOGY

## 2023-12-01 PROCEDURE — 87205 SMEAR GRAM STAIN: CPT

## 2023-12-01 PROCEDURE — 6360000002 HC RX W HCPCS

## 2023-12-01 PROCEDURE — 80048 BASIC METABOLIC PNL TOTAL CA: CPT

## 2023-12-01 PROCEDURE — 87070 CULTURE OTHR SPECIMN AEROBIC: CPT

## 2023-12-01 PROCEDURE — 36415 COLL VENOUS BLD VENIPUNCTURE: CPT

## 2023-12-01 PROCEDURE — 6370000000 HC RX 637 (ALT 250 FOR IP)

## 2023-12-01 PROCEDURE — 10140 I&D HMTMA SEROMA/FLUID COLLJ: CPT

## 2023-12-01 PROCEDURE — 2580000003 HC RX 258

## 2023-12-01 PROCEDURE — 2060000000 HC ICU INTERMEDIATE R&B

## 2023-12-01 PROCEDURE — 6360000002 HC RX W HCPCS: Performed by: STUDENT IN AN ORGANIZED HEALTH CARE EDUCATION/TRAINING PROGRAM

## 2023-12-01 PROCEDURE — 99233 SBSQ HOSP IP/OBS HIGH 50: CPT | Performed by: STUDENT IN AN ORGANIZED HEALTH CARE EDUCATION/TRAINING PROGRAM

## 2023-12-01 PROCEDURE — 6370000000 HC RX 637 (ALT 250 FOR IP): Performed by: STUDENT IN AN ORGANIZED HEALTH CARE EDUCATION/TRAINING PROGRAM

## 2023-12-01 PROCEDURE — 87186 SC STD MICRODIL/AGAR DIL: CPT

## 2023-12-01 PROCEDURE — 94640 AIRWAY INHALATION TREATMENT: CPT

## 2023-12-01 PROCEDURE — 85025 COMPLETE CBC W/AUTO DIFF WBC: CPT

## 2023-12-01 PROCEDURE — 6360000002 HC RX W HCPCS: Performed by: RADIOLOGY

## 2023-12-01 PROCEDURE — 87075 CULTR BACTERIA EXCEPT BLOOD: CPT

## 2023-12-01 PROCEDURE — 2709999900 CT GUIDED NEEDLE PLACEMENT

## 2023-12-01 PROCEDURE — 94761 N-INVAS EAR/PLS OXIMETRY MLT: CPT

## 2023-12-01 PROCEDURE — 87077 CULTURE AEROBIC IDENTIFY: CPT

## 2023-12-01 RX ORDER — HYDROCODONE BITARTRATE AND ACETAMINOPHEN 5; 325 MG/1; MG/1
1 TABLET ORAL EVERY 4 HOURS PRN
Status: DISCONTINUED | OUTPATIENT
Start: 2023-12-01 | End: 2023-12-05 | Stop reason: HOSPADM

## 2023-12-01 RX ORDER — ACETAMINOPHEN 325 MG/1
650 TABLET ORAL EVERY 4 HOURS PRN
Status: DISCONTINUED | OUTPATIENT
Start: 2023-12-01 | End: 2023-12-05 | Stop reason: HOSPADM

## 2023-12-01 RX ORDER — HYDROCODONE BITARTRATE AND ACETAMINOPHEN 5; 325 MG/1; MG/1
2 TABLET ORAL EVERY 4 HOURS PRN
Status: DISCONTINUED | OUTPATIENT
Start: 2023-12-01 | End: 2023-12-05 | Stop reason: HOSPADM

## 2023-12-01 RX ORDER — FENTANYL CITRATE 50 UG/ML
INJECTION, SOLUTION INTRAMUSCULAR; INTRAVENOUS PRN
Status: COMPLETED | OUTPATIENT
Start: 2023-12-01 | End: 2023-12-01

## 2023-12-01 RX ORDER — MIDAZOLAM HYDROCHLORIDE 1 MG/ML
INJECTION INTRAMUSCULAR; INTRAVENOUS PRN
Status: COMPLETED | OUTPATIENT
Start: 2023-12-01 | End: 2023-12-01

## 2023-12-01 RX ADMIN — SODIUM CHLORIDE, PRESERVATIVE FREE 10 ML: 5 INJECTION INTRAVENOUS at 20:32

## 2023-12-01 RX ADMIN — LEVOTHYROXINE SODIUM 75 MCG: 0.07 TABLET ORAL at 08:23

## 2023-12-01 RX ADMIN — HYDROCODONE BITARTRATE AND ACETAMINOPHEN 2 TABLET: 5; 325 TABLET ORAL at 20:32

## 2023-12-01 RX ADMIN — MIDAZOLAM 1 MG: 1 INJECTION INTRAMUSCULAR; INTRAVENOUS at 11:26

## 2023-12-01 RX ADMIN — FENTANYL CITRATE 50 MCG: 50 INJECTION, SOLUTION INTRAMUSCULAR; INTRAVENOUS at 11:26

## 2023-12-01 RX ADMIN — PIPERACILLIN SODIUM AND TAZOBACTAM SODIUM 3375 MG: 3; .375 INJECTION, POWDER, LYOPHILIZED, FOR SOLUTION INTRAVENOUS at 06:06

## 2023-12-01 RX ADMIN — TIOTROPIUM BROMIDE INHALATION SPRAY 2 PUFF: 3.12 SPRAY, METERED RESPIRATORY (INHALATION) at 08:44

## 2023-12-01 RX ADMIN — ACETAMINOPHEN 650 MG: 325 TABLET ORAL at 08:27

## 2023-12-01 RX ADMIN — ATORVASTATIN CALCIUM 40 MG: 40 TABLET, FILM COATED ORAL at 20:33

## 2023-12-01 RX ADMIN — PANTOPRAZOLE SODIUM 40 MG: 40 TABLET, DELAYED RELEASE ORAL at 15:45

## 2023-12-01 RX ADMIN — POTASSIUM CHLORIDE 20 MEQ: 1500 TABLET, EXTENDED RELEASE ORAL at 10:38

## 2023-12-01 RX ADMIN — CARVEDILOL 3.12 MG: 6.25 TABLET, FILM COATED ORAL at 10:38

## 2023-12-01 RX ADMIN — CARVEDILOL 3.12 MG: 6.25 TABLET, FILM COATED ORAL at 20:32

## 2023-12-01 RX ADMIN — FERROUS SULFATE TAB 325 MG (65 MG ELEMENTAL FE) 325 MG: 325 (65 FE) TAB at 20:33

## 2023-12-01 RX ADMIN — Medication 50 MG: at 14:32

## 2023-12-01 RX ADMIN — MOMETASONE FUROATE AND FORMOTEROL FUMARATE DIHYDRATE 2 PUFF: 200; 5 AEROSOL RESPIRATORY (INHALATION) at 17:04

## 2023-12-01 RX ADMIN — VANCOMYCIN HYDROCHLORIDE 1000 MG: 1 INJECTION, POWDER, LYOPHILIZED, FOR SOLUTION INTRAVENOUS at 10:00

## 2023-12-01 RX ADMIN — HYDROCODONE BITARTRATE AND ACETAMINOPHEN 2 TABLET: 5; 325 TABLET ORAL at 14:32

## 2023-12-01 RX ADMIN — QUETIAPINE FUMARATE 600 MG: 400 TABLET ORAL at 22:02

## 2023-12-01 RX ADMIN — PANTOPRAZOLE SODIUM 40 MG: 40 TABLET, DELAYED RELEASE ORAL at 06:03

## 2023-12-01 RX ADMIN — FERROUS SULFATE TAB 325 MG (65 MG ELEMENTAL FE) 325 MG: 325 (65 FE) TAB at 10:38

## 2023-12-01 RX ADMIN — KETOROLAC TROMETHAMINE 30 MG: 30 INJECTION, SOLUTION INTRAMUSCULAR at 00:23

## 2023-12-01 RX ADMIN — SODIUM CHLORIDE, PRESERVATIVE FREE 10 ML: 5 INJECTION INTRAVENOUS at 14:38

## 2023-12-01 RX ADMIN — PIPERACILLIN SODIUM AND TAZOBACTAM SODIUM 3375 MG: 3; .375 INJECTION, POWDER, LYOPHILIZED, FOR SOLUTION INTRAVENOUS at 15:34

## 2023-12-01 RX ADMIN — BUPROPION HYDROCHLORIDE 150 MG: 150 TABLET, FILM COATED, EXTENDED RELEASE ORAL at 10:38

## 2023-12-01 RX ADMIN — ESCITALOPRAM OXALATE 20 MG: 20 TABLET, FILM COATED ORAL at 10:38

## 2023-12-01 RX ADMIN — FENOFIBRATE 160 MG: 160 TABLET ORAL at 10:38

## 2023-12-01 RX ADMIN — MOMETASONE FUROATE AND FORMOTEROL FUMARATE DIHYDRATE 2 PUFF: 200; 5 AEROSOL RESPIRATORY (INHALATION) at 08:44

## 2023-12-01 RX ADMIN — TRAZODONE HYDROCHLORIDE 200 MG: 100 TABLET ORAL at 20:32

## 2023-12-01 RX ADMIN — PIPERACILLIN SODIUM AND TAZOBACTAM SODIUM 3375 MG: 3; .375 INJECTION, POWDER, LYOPHILIZED, FOR SOLUTION INTRAVENOUS at 23:18

## 2023-12-01 RX ADMIN — FOLIC ACID 1 MG: 1 TABLET ORAL at 14:32

## 2023-12-01 ASSESSMENT — PAIN DESCRIPTION - LOCATION
LOCATION: FLANK
LOCATION: BACK;FLANK
LOCATION: FLANK;BACK
LOCATION: FLANK
LOCATION: FLANK
LOCATION: BACK
LOCATION: FLANK

## 2023-12-01 ASSESSMENT — PAIN DESCRIPTION - FREQUENCY
FREQUENCY: CONTINUOUS

## 2023-12-01 ASSESSMENT — PAIN SCALES - GENERAL
PAINLEVEL_OUTOF10: 6
PAINLEVEL_OUTOF10: 8
PAINLEVEL_OUTOF10: 7
PAINLEVEL_OUTOF10: 9
PAINLEVEL_OUTOF10: 6
PAINLEVEL_OUTOF10: 8
PAINLEVEL_OUTOF10: 8
PAINLEVEL_OUTOF10: 6
PAINLEVEL_OUTOF10: 8
PAINLEVEL_OUTOF10: 7
PAINLEVEL_OUTOF10: 9

## 2023-12-01 ASSESSMENT — PAIN DESCRIPTION - ONSET
ONSET: ON-GOING

## 2023-12-01 ASSESSMENT — PAIN - FUNCTIONAL ASSESSMENT
PAIN_FUNCTIONAL_ASSESSMENT: PREVENTS OR INTERFERES SOME ACTIVE ACTIVITIES AND ADLS
PAIN_FUNCTIONAL_ASSESSMENT: ACTIVITIES ARE NOT PREVENTED
PAIN_FUNCTIONAL_ASSESSMENT: PREVENTS OR INTERFERES SOME ACTIVE ACTIVITIES AND ADLS
PAIN_FUNCTIONAL_ASSESSMENT: ACTIVITIES ARE NOT PREVENTED
PAIN_FUNCTIONAL_ASSESSMENT: PREVENTS OR INTERFERES SOME ACTIVE ACTIVITIES AND ADLS
PAIN_FUNCTIONAL_ASSESSMENT: ACTIVITIES ARE NOT PREVENTED
PAIN_FUNCTIONAL_ASSESSMENT: PREVENTS OR INTERFERES SOME ACTIVE ACTIVITIES AND ADLS

## 2023-12-01 ASSESSMENT — PAIN DESCRIPTION - ORIENTATION
ORIENTATION: LEFT

## 2023-12-01 ASSESSMENT — PAIN DESCRIPTION - PAIN TYPE
TYPE: ACUTE PAIN

## 2023-12-01 ASSESSMENT — PAIN DESCRIPTION - DESCRIPTORS
DESCRIPTORS: SHARP;ACHING
DESCRIPTORS: ACHING;DISCOMFORT;SHARP;SORE

## 2023-12-01 NOTE — CARE COORDINATION
12/1/23, 2:25 PM EST  Discharge Planning Evaluation  Social work consult received, patient from [facility]. Patient/Family preference is to return to [facility]. The patient's current payor source at the facility is [facility] Medicare. Medicare skilled days available: Yes  Medicare does the patient have a three midnight qualifying stay? Yes  Insurance precert:  Yes  Spoke with Alivia Levy at the facility. Patient bed hold: Yes- Medicaid  Anticipated transport plan: Ambulance v. ambulette  Patient's Healthcare Decision Maker: Named in [address]       Readmission Risk Low 0-14, Mod 15-19), High > 20: Readmission Risk Score: 28.3    Current PCP: Georgia Shell MD  PCP verified by CM? Yes    Patient Orientation: Alert and Oriented    Patient Cognition: Alert  History Provided by: Patient    Advance Directives:      Code Status: Full Code   Patient's Primary Decision Maker is: Named in [id number] [employer] (Active): Tj Betancourt Brother/Sister - [phone]     Discharge Planning:    Patient lives with: Other (Comment) (Skilled nursing at [facility]) Type of Home: [address]  Primary Care Giver:  Other (Comment) (Rehab at [facility])  Patient Support Systems include: Family Members, Children   Current Financial resources: Medicare, Medicaid  Current community resources: ECF/Home Care, Transportation  Current services prior to admission: [address], Durable Medical Equipment            Current DME: Wheelchair            Type of Home Care services:  None    ADLS  Prior functional level: Assistance with the following:, Bathing, Dressing, Toileting, Housework, Mobility, Cooking  Current functional level: Assistance with the following:, Bathing, Dressing, Toileting, Cooking, Housework, Mobility    Family can provide assistance at DC: No  Would you like Case Management to discuss the discharge plan with any other family members/significant others, and if

## 2023-12-01 NOTE — FLOWSHEET NOTE
12/01/23 1013   Safe Environment   Safety Measures Bed in low position;Call light within reach; Side rails X 2  (Virtual Nurse Safety Round Complete)     Call placed to patients room, patient did not respond to audio, video turned on for safety purposes. Patient is resting in bed, call light within reach, no signs or symtpoms of distress noted.

## 2023-12-01 NOTE — CONSULTS
CONSULTATION NOTE :ID       Patient - Tashi Deleon,  Age - 79 y.o.    - 1955      Room Number - 4A-18/018-A   MRN -  [id number]   [facility]  Date of Admission -  2023  1:01 PM  Patient's PCP: Nubia Howell MD     Requesting Physician: Dahiana Gordon DO    REASON FOR CONSULTATION   Left Perinephric Abscess  CHIEF COMPLAINT   Abdominal Pain, Left    HISTORY OF PRESENT ILLNESS       This is a very pleasant 79 y.o. female with a PMH COPD, hyperlipidemia, hypertension, hypothyroidism, bipolar, depression, urinary incontinence and recent left sided perinephric abscess who was admitted to the hospital with a chief complaint of left sided abdominal/flank pain on 23. Her pain started on 23 following displacement and removal of her ;eft nephrostomy tube. Patient was seen in the ED on  with plan to follow up with Urology. Patient states since displacement of hier nephrostomy tube she has been feeling unwell, having fever, chills, and some confusion. Patient states that she is having trouble thinking due to brain fog. Patient notes she has been having left flank pain that has been slowly worsening in intensity. Patient presented to the ED on  due to worsening pain where Urology was consulted and patient was started on IV Vancomycin and Zosyn. Patient does have history of left perinephric abscess and had nephrostomy tube placed on 10/30. Cultures from the abscess at that time grew Proteus and patients urine culture from 10/29/23 showed Klebsiella and Proteus. CT abdomen and pelvis on  showed left renal abscess measuring 2.8 x 4.6 x 5.8 cm and left posterior perinephric abscess 3.2 x 5.5 x 4.4 cm. Patient seen morning of , reports fevers, chills, weakness, fatigue, pain with urination increased frequency and left sided abdominal pain. Denies, headache, diarrhea, constipation, chest pain, SOB, or palpitations.     PAST MEDICAL

## 2023-12-01 NOTE — FLOWSHEET NOTE
12/01/23 1311   Safe Environment   Safety Measures Bed in low position;Call light within reach; Side rails X 2  (Virtual Nurse Safety Round Complete)     Call placed to patients room, patient did not respond to audio, video turned on for safety purposes. Patient is resting in bed with eyes closed, call light within reach, no signs or symtpoms of distress noted.

## 2023-12-01 NOTE — CARE COORDINATION
12/1/23, 7:24 AM EST      DISCHARGE PLANNING EVALUATION    Tashi Deleon  Admitted: 11/30/2023  Hospital Day: 1    Location: 4A-18/018-A Reason for admit: Perinephric abscess [N15.1]  Abdominal pain [R10.9]    Past Medical History:   Diagnosis Date    Allergic rhinitis     Arthritis     back, arms, hips    Bipolar 1 disorder ([address])     Cancer ([address]) 2011    cancerous polyps removed - Dr. Roberta Moffett    Cataract     Colon polyps     COPD (chronic obstructive pulmonary disease) ([address]) 07/24/2014    Coronary vasospasm ([address]) 08/17/2019    Depression     Diverticulitis of colon     GERD (gastroesophageal reflux disease)     Glaucoma     Hearing loss     Hiatal hernia     History of arterial ischemic stroke 07/20/2019    History of colonoscopy 2002    History of kidney stones     Hx of blood clots 06/17/2014    PE and collar bone area after shoulder surgery    Hyperlipidemia     Hypertension     Liver disease     enlarged liver - damaged with alcohol in past but no cirrhosis per patient    Pneumonia 07/24/2014    Seasonal allergies     sneezing    Sexual problems     Suicidal thoughts     2015 admitted to 4E from [facility]    Thyroid disease     Urinary incontinence     Vomiting     Wears dentures     Wears glasses        Procedure:   11/30 CT A/P: The subcapsular abscess in the left kidney and the abscess in the left posterior perinephric space are both larger with measurements provided in the discussion. No obstructive uropathy is observed. Mass effect on the left kidney is unchanged and the nonobstructing left nephrolithiasis appears stable    Barriers to Discharge: To ED for abdominal pain and left flank pain along with chills. Hospitalist and Urology following. Consult to ID to see for \"Recent L perinephric abscess, increasing in size\". SCDs. Telemetry. Lipitor. Wellbutrin. Coreg. Lovenox. Lexapro. Fenofibrate. Iron. Folic Acid. Synthroid. Lidocaine patch. Inhalers. Protonix. Klor-con. Seroquel. Pyridoxine. IV Zosyn q8h.  IV

## 2023-12-01 NOTE — CARE COORDINATION
12/01/23 0723   Readmission Assessment   Number of Days since last admission? 8-30 days   Previous Disposition SNF   Who is being Interviewed Patient   What was the patient's/caregiver's perception as to why they think they needed to return back to the hospital? Other (Comment)  (abdominal pain)   Did you visit your Primary Care Physician after you left the hospital, before you returned this time? No  (at SNF)   Why weren't you able to visit your PCP? Other (Comment)  (at SNF)   Did you see a specialist, such as Cardiac, Pulmonary, Orthopedic Physician, etc. after you left the hospital? No   Who advised the patient to return to the hospital? Skilled Unit   In our efforts to provide the best possible care to you and others like you, can you think of anything that we could have done to help you after you left the hospital the first time, so that you might not have needed to return so soon?  Other (Comment)  (from SNF)

## 2023-12-01 NOTE — CARE COORDINATION
12/1/23, 7:52 AM EST    DISCHARGE PLANNING EVALUATION    SW spoke with Rubina from [facility], confirmed that patient is from their facility and is a bed hold. Rubina states that because patient is Medicaid, they may be able to accommodate long-term IV antibiotics depending on frequency. SW will keep her updated on patient's status.

## 2023-12-02 LAB
ANION GAP SERPL CALC-SCNC: 8 MEQ/L (ref 8–16)
BASOPHILS ABSOLUTE: 0 THOU/MM3 (ref 0–0.1)
BASOPHILS NFR BLD AUTO: 0.5 %
BUN SERPL-MCNC: 13 MG/DL (ref 7–22)
CALCIUM SERPL-MCNC: 10.4 MG/DL (ref 8.5–10.5)
CHLORIDE SERPL-SCNC: 102 MEQ/L (ref 98–111)
CO2 SERPL-SCNC: 25 MEQ/L (ref 23–33)
CREAT SERPL-MCNC: 0.8 MG/DL (ref 0.4–1.2)
DEPRECATED RDW RBC AUTO: 54 FL (ref 35–45)
EOSINOPHIL NFR BLD AUTO: 2.2 %
EOSINOPHILS ABSOLUTE: 0.2 THOU/MM3 (ref 0–0.4)
ERYTHROCYTE [DISTWIDTH] IN BLOOD BY AUTOMATED COUNT: 18.7 % (ref 11.5–14.5)
GFR SERPL CREATININE-BSD FRML MDRD: > 60 ML/MIN/1.73M2
GLUCOSE SERPL-MCNC: 226 MG/DL (ref 70–108)
HCT VFR BLD AUTO: 31.2 % (ref 37–47)
HGB BLD-MCNC: 10.2 GM/DL (ref 12–16)
IMM GRANULOCYTES # BLD AUTO: 0.05 THOU/MM3 (ref 0–0.07)
IMM GRANULOCYTES NFR BLD AUTO: 0.7 %
LYMPHOCYTES ABSOLUTE: 2 THOU/MM3 (ref 1–4.8)
LYMPHOCYTES NFR BLD AUTO: 26.6 %
MCH RBC QN AUTO: 25.9 PG (ref 26–33)
MCHC RBC AUTO-ENTMCNC: 32.7 GM/DL (ref 32.2–35.5)
MCV RBC AUTO: 79.2 FL (ref 81–99)
MONOCYTES ABSOLUTE: 0.6 THOU/MM3 (ref 0.4–1.3)
MONOCYTES NFR BLD AUTO: 7.9 %
NEUTROPHILS NFR BLD AUTO: 62.1 %
NRBC BLD AUTO-RTO: 0 /100 WBC
PLATELET # BLD AUTO: 265 THOU/MM3 (ref 130–400)
PMV BLD AUTO: 9.9 FL (ref 9.4–12.4)
POTASSIUM SERPL-SCNC: 5 MEQ/L (ref 3.5–5.2)
RBC # BLD AUTO: 3.94 MILL/MM3 (ref 4.2–5.4)
SEGMENTED NEUTROPHILS ABSOLUTE COUNT: 4.7 THOU/MM3 (ref 1.8–7.7)
SODIUM SERPL-SCNC: 135 MEQ/L (ref 135–145)
WBC # BLD AUTO: 7.6 THOU/MM3 (ref 4.8–10.8)

## 2023-12-02 PROCEDURE — 36415 COLL VENOUS BLD VENIPUNCTURE: CPT

## 2023-12-02 PROCEDURE — 97166 OT EVAL MOD COMPLEX 45 MIN: CPT

## 2023-12-02 PROCEDURE — 85025 COMPLETE CBC W/AUTO DIFF WBC: CPT

## 2023-12-02 PROCEDURE — 80048 BASIC METABOLIC PNL TOTAL CA: CPT

## 2023-12-02 PROCEDURE — 6370000000 HC RX 637 (ALT 250 FOR IP)

## 2023-12-02 PROCEDURE — 99232 SBSQ HOSP IP/OBS MODERATE 35: CPT | Performed by: STUDENT IN AN ORGANIZED HEALTH CARE EDUCATION/TRAINING PROGRAM

## 2023-12-02 PROCEDURE — 94640 AIRWAY INHALATION TREATMENT: CPT

## 2023-12-02 PROCEDURE — 6370000000 HC RX 637 (ALT 250 FOR IP): Performed by: STUDENT IN AN ORGANIZED HEALTH CARE EDUCATION/TRAINING PROGRAM

## 2023-12-02 PROCEDURE — 6360000002 HC RX W HCPCS

## 2023-12-02 PROCEDURE — 97535 SELF CARE MNGMENT TRAINING: CPT

## 2023-12-02 PROCEDURE — 2580000003 HC RX 258

## 2023-12-02 PROCEDURE — 0T9130Z DRAINAGE OF LEFT KIDNEY WITH DRAINAGE DEVICE, PERCUTANEOUS APPROACH: ICD-10-PCS | Performed by: RADIOLOGY

## 2023-12-02 PROCEDURE — 97530 THERAPEUTIC ACTIVITIES: CPT

## 2023-12-02 PROCEDURE — 1200000003 HC TELEMETRY R&B

## 2023-12-02 RX ADMIN — PIPERACILLIN SODIUM AND TAZOBACTAM SODIUM 3375 MG: 3; .375 INJECTION, POWDER, LYOPHILIZED, FOR SOLUTION INTRAVENOUS at 06:53

## 2023-12-02 RX ADMIN — CARVEDILOL 3.12 MG: 6.25 TABLET, FILM COATED ORAL at 20:11

## 2023-12-02 RX ADMIN — MOMETASONE FUROATE AND FORMOTEROL FUMARATE DIHYDRATE 2 PUFF: 200; 5 AEROSOL RESPIRATORY (INHALATION) at 08:30

## 2023-12-02 RX ADMIN — TIOTROPIUM BROMIDE INHALATION SPRAY 2 PUFF: 3.12 SPRAY, METERED RESPIRATORY (INHALATION) at 08:30

## 2023-12-02 RX ADMIN — POTASSIUM CHLORIDE 20 MEQ: 1500 TABLET, EXTENDED RELEASE ORAL at 08:00

## 2023-12-02 RX ADMIN — FERROUS SULFATE TAB 325 MG (65 MG ELEMENTAL FE) 325 MG: 325 (65 FE) TAB at 08:01

## 2023-12-02 RX ADMIN — FOLIC ACID 1 MG: 1 TABLET ORAL at 08:01

## 2023-12-02 RX ADMIN — PANTOPRAZOLE SODIUM 40 MG: 40 TABLET, DELAYED RELEASE ORAL at 06:46

## 2023-12-02 RX ADMIN — BUPROPION HYDROCHLORIDE 150 MG: 150 TABLET, FILM COATED, EXTENDED RELEASE ORAL at 08:02

## 2023-12-02 RX ADMIN — ATORVASTATIN CALCIUM 40 MG: 40 TABLET, FILM COATED ORAL at 20:11

## 2023-12-02 RX ADMIN — MOMETASONE FUROATE AND FORMOTEROL FUMARATE DIHYDRATE 2 PUFF: 200; 5 AEROSOL RESPIRATORY (INHALATION) at 17:06

## 2023-12-02 RX ADMIN — ENOXAPARIN SODIUM 40 MG: 100 INJECTION SUBCUTANEOUS at 08:00

## 2023-12-02 RX ADMIN — CARVEDILOL 3.12 MG: 6.25 TABLET, FILM COATED ORAL at 08:02

## 2023-12-02 RX ADMIN — ESCITALOPRAM OXALATE 20 MG: 20 TABLET, FILM COATED ORAL at 08:01

## 2023-12-02 RX ADMIN — LEVOTHYROXINE SODIUM 75 MCG: 0.07 TABLET ORAL at 06:46

## 2023-12-02 RX ADMIN — QUETIAPINE FUMARATE 600 MG: 400 TABLET ORAL at 20:11

## 2023-12-02 RX ADMIN — FENOFIBRATE 160 MG: 160 TABLET ORAL at 08:02

## 2023-12-02 RX ADMIN — Medication 50 MG: at 08:01

## 2023-12-02 RX ADMIN — HYDROCODONE BITARTRATE AND ACETAMINOPHEN 1 TABLET: 5; 325 TABLET ORAL at 13:54

## 2023-12-02 RX ADMIN — FERROUS SULFATE TAB 325 MG (65 MG ELEMENTAL FE) 325 MG: 325 (65 FE) TAB at 20:10

## 2023-12-02 RX ADMIN — PANTOPRAZOLE SODIUM 40 MG: 40 TABLET, DELAYED RELEASE ORAL at 13:54

## 2023-12-02 RX ADMIN — TRAZODONE HYDROCHLORIDE 200 MG: 100 TABLET ORAL at 20:10

## 2023-12-02 RX ADMIN — HYDROCODONE BITARTRATE AND ACETAMINOPHEN 2 TABLET: 5; 325 TABLET ORAL at 20:10

## 2023-12-02 RX ADMIN — SODIUM CHLORIDE, PRESERVATIVE FREE 10 ML: 5 INJECTION INTRAVENOUS at 20:10

## 2023-12-02 RX ADMIN — PIPERACILLIN SODIUM AND TAZOBACTAM SODIUM 3375 MG: 3; .375 INJECTION, POWDER, LYOPHILIZED, FOR SOLUTION INTRAVENOUS at 13:56

## 2023-12-02 ASSESSMENT — PAIN DESCRIPTION - ORIENTATION
ORIENTATION: LEFT
ORIENTATION: LEFT

## 2023-12-02 ASSESSMENT — PAIN SCALES - GENERAL
PAINLEVEL_OUTOF10: 6
PAINLEVEL_OUTOF10: 8
PAINLEVEL_OUTOF10: 8
PAINLEVEL_OUTOF10: 0

## 2023-12-02 ASSESSMENT — PAIN DESCRIPTION - DESCRIPTORS
DESCRIPTORS: ACHING;SORE
DESCRIPTORS: ACHING;DISCOMFORT

## 2023-12-02 ASSESSMENT — PAIN DESCRIPTION - ONSET: ONSET: ON-GOING

## 2023-12-02 ASSESSMENT — PAIN DESCRIPTION - FREQUENCY: FREQUENCY: CONTINUOUS

## 2023-12-02 ASSESSMENT — PAIN DESCRIPTION - LOCATION
LOCATION: BACK
LOCATION: FLANK

## 2023-12-02 ASSESSMENT — PAIN DESCRIPTION - PAIN TYPE: TYPE: ACUTE PAIN

## 2023-12-02 NOTE — FLOWSHEET NOTE
12/02/23 1027   Safe Environment   Safety Measures Call light within reach; Side rails X 2;Standard Safety Measures  (Virtual Nurse Safety Round)     VN completed safety rounds. Camera accessed for safety. Patient visualized alert in bed. No s/s of any distress noted. Call bell and personal items within reach. No needs at this time.

## 2023-12-03 LAB
BASOPHILS ABSOLUTE: 0.1 THOU/MM3 (ref 0–0.1)
BASOPHILS NFR BLD AUTO: 0.7 %
DEPRECATED RDW RBC AUTO: 57.5 FL (ref 35–45)
EOSINOPHIL NFR BLD AUTO: 2.4 %
EOSINOPHILS ABSOLUTE: 0.2 THOU/MM3 (ref 0–0.4)
ERYTHROCYTE [DISTWIDTH] IN BLOOD BY AUTOMATED COUNT: 18.7 % (ref 11.5–14.5)
HCT VFR BLD AUTO: 33 % (ref 37–47)
HGB BLD-MCNC: 9.9 GM/DL (ref 12–16)
IMM GRANULOCYTES # BLD AUTO: 0.06 THOU/MM3 (ref 0–0.07)
IMM GRANULOCYTES NFR BLD AUTO: 0.8 %
LYMPHOCYTES ABSOLUTE: 2.2 THOU/MM3 (ref 1–4.8)
LYMPHOCYTES NFR BLD AUTO: 29.3 %
MCH RBC QN AUTO: 25.4 PG (ref 26–33)
MCHC RBC AUTO-ENTMCNC: 30 GM/DL (ref 32.2–35.5)
MCV RBC AUTO: 84.8 FL (ref 81–99)
MONOCYTES ABSOLUTE: 0.6 THOU/MM3 (ref 0.4–1.3)
MONOCYTES NFR BLD AUTO: 8.5 %
NEUTROPHILS NFR BLD AUTO: 58.3 %
NRBC BLD AUTO-RTO: 0 /100 WBC
PLATELET # BLD AUTO: 319 THOU/MM3 (ref 130–400)
PMV BLD AUTO: 9.1 FL (ref 9.4–12.4)
RBC # BLD AUTO: 3.89 MILL/MM3 (ref 4.2–5.4)
SEGMENTED NEUTROPHILS ABSOLUTE COUNT: 4.4 THOU/MM3 (ref 1.8–7.7)
WBC # BLD AUTO: 7.6 THOU/MM3 (ref 4.8–10.8)

## 2023-12-03 PROCEDURE — 94640 AIRWAY INHALATION TREATMENT: CPT

## 2023-12-03 PROCEDURE — 6360000002 HC RX W HCPCS

## 2023-12-03 PROCEDURE — 85025 COMPLETE CBC W/AUTO DIFF WBC: CPT

## 2023-12-03 PROCEDURE — 6370000000 HC RX 637 (ALT 250 FOR IP): Performed by: STUDENT IN AN ORGANIZED HEALTH CARE EDUCATION/TRAINING PROGRAM

## 2023-12-03 PROCEDURE — 99232 SBSQ HOSP IP/OBS MODERATE 35: CPT | Performed by: STUDENT IN AN ORGANIZED HEALTH CARE EDUCATION/TRAINING PROGRAM

## 2023-12-03 PROCEDURE — 2580000003 HC RX 258

## 2023-12-03 PROCEDURE — 36415 COLL VENOUS BLD VENIPUNCTURE: CPT

## 2023-12-03 PROCEDURE — 6370000000 HC RX 637 (ALT 250 FOR IP)

## 2023-12-03 PROCEDURE — 94760 N-INVAS EAR/PLS OXIMETRY 1: CPT

## 2023-12-03 PROCEDURE — 1200000003 HC TELEMETRY R&B

## 2023-12-03 RX ADMIN — SODIUM CHLORIDE, PRESERVATIVE FREE 10 ML: 5 INJECTION INTRAVENOUS at 20:39

## 2023-12-03 RX ADMIN — QUETIAPINE FUMARATE 600 MG: 400 TABLET ORAL at 20:40

## 2023-12-03 RX ADMIN — DOCUSATE SODIUM 100 MG: 100 CAPSULE, LIQUID FILLED ORAL at 08:11

## 2023-12-03 RX ADMIN — POTASSIUM CHLORIDE 20 MEQ: 1500 TABLET, EXTENDED RELEASE ORAL at 08:07

## 2023-12-03 RX ADMIN — TRAZODONE HYDROCHLORIDE 200 MG: 100 TABLET ORAL at 20:41

## 2023-12-03 RX ADMIN — PIPERACILLIN SODIUM AND TAZOBACTAM SODIUM 3375 MG: 3; .375 INJECTION, POWDER, LYOPHILIZED, FOR SOLUTION INTRAVENOUS at 02:11

## 2023-12-03 RX ADMIN — PIPERACILLIN SODIUM AND TAZOBACTAM SODIUM 3375 MG: 3; .375 INJECTION, POWDER, LYOPHILIZED, FOR SOLUTION INTRAVENOUS at 20:40

## 2023-12-03 RX ADMIN — PANTOPRAZOLE SODIUM 40 MG: 40 TABLET, DELAYED RELEASE ORAL at 08:07

## 2023-12-03 RX ADMIN — HYDROCODONE BITARTRATE AND ACETAMINOPHEN 2 TABLET: 5; 325 TABLET ORAL at 07:27

## 2023-12-03 RX ADMIN — ENOXAPARIN SODIUM 40 MG: 100 INJECTION SUBCUTANEOUS at 08:07

## 2023-12-03 RX ADMIN — FENOFIBRATE 160 MG: 160 TABLET ORAL at 13:06

## 2023-12-03 RX ADMIN — MOMETASONE FUROATE AND FORMOTEROL FUMARATE DIHYDRATE 2 PUFF: 200; 5 AEROSOL RESPIRATORY (INHALATION) at 17:16

## 2023-12-03 RX ADMIN — LEVOTHYROXINE SODIUM 75 MCG: 0.07 TABLET ORAL at 08:07

## 2023-12-03 RX ADMIN — FOLIC ACID 1 MG: 1 TABLET ORAL at 08:07

## 2023-12-03 RX ADMIN — PIPERACILLIN SODIUM AND TAZOBACTAM SODIUM 3375 MG: 3; .375 INJECTION, POWDER, LYOPHILIZED, FOR SOLUTION INTRAVENOUS at 13:08

## 2023-12-03 RX ADMIN — FERROUS SULFATE TAB 325 MG (65 MG ELEMENTAL FE) 325 MG: 325 (65 FE) TAB at 08:07

## 2023-12-03 RX ADMIN — PANTOPRAZOLE SODIUM 40 MG: 40 TABLET, DELAYED RELEASE ORAL at 15:25

## 2023-12-03 RX ADMIN — HYDROCODONE BITARTRATE AND ACETAMINOPHEN 2 TABLET: 5; 325 TABLET ORAL at 20:41

## 2023-12-03 RX ADMIN — Medication 50 MG: at 08:08

## 2023-12-03 RX ADMIN — BUPROPION HYDROCHLORIDE 150 MG: 150 TABLET, FILM COATED, EXTENDED RELEASE ORAL at 13:06

## 2023-12-03 RX ADMIN — MOMETASONE FUROATE AND FORMOTEROL FUMARATE DIHYDRATE 2 PUFF: 200; 5 AEROSOL RESPIRATORY (INHALATION) at 07:34

## 2023-12-03 RX ADMIN — ESCITALOPRAM OXALATE 20 MG: 20 TABLET, FILM COATED ORAL at 13:06

## 2023-12-03 RX ADMIN — TIOTROPIUM BROMIDE INHALATION SPRAY 2 PUFF: 3.12 SPRAY, METERED RESPIRATORY (INHALATION) at 07:34

## 2023-12-03 RX ADMIN — HYDROCODONE BITARTRATE AND ACETAMINOPHEN 2 TABLET: 5; 325 TABLET ORAL at 13:06

## 2023-12-03 ASSESSMENT — PAIN SCALES - GENERAL
PAINLEVEL_OUTOF10: 7
PAINLEVEL_OUTOF10: 0
PAINLEVEL_OUTOF10: 8

## 2023-12-03 ASSESSMENT — PAIN DESCRIPTION - ORIENTATION: ORIENTATION: LEFT

## 2023-12-03 ASSESSMENT — PAIN SCALES - WONG BAKER
WONGBAKER_NUMERICALRESPONSE: 0
WONGBAKER_NUMERICALRESPONSE: 0

## 2023-12-03 ASSESSMENT — PAIN DESCRIPTION - PAIN TYPE: TYPE: ACUTE PAIN

## 2023-12-03 ASSESSMENT — PAIN DESCRIPTION - DESCRIPTORS: DESCRIPTORS: ACHING

## 2023-12-03 ASSESSMENT — PAIN - FUNCTIONAL ASSESSMENT: PAIN_FUNCTIONAL_ASSESSMENT: ACTIVITIES ARE NOT PREVENTED

## 2023-12-03 ASSESSMENT — PAIN DESCRIPTION - LOCATION
LOCATION: BACK
LOCATION: BACK

## 2023-12-03 ASSESSMENT — PAIN DESCRIPTION - ONSET: ONSET: ON-GOING

## 2023-12-03 ASSESSMENT — PAIN DESCRIPTION - FREQUENCY: FREQUENCY: CONTINUOUS

## 2023-12-03 NOTE — FLOWSHEET NOTE
12/03/23 1726   Safe Environment   Safety Measures Call light within reach; Bed in low position;Standard Safety Measures     Call placed to patients room, patient responds to audio, permitted video. Patient alert and oriented. Patient denied any voiced concerns/complaints at this time. Patient educated on utilizing call light. Call light within reach, no signs or symtpoms of distress noted.

## 2023-12-03 NOTE — FLOWSHEET NOTE
12/03/23 1154   Safe Environment   Safety Measures Call light within reach  (virtual nurse safety round)     VN called into patients room and introduced myself and role. Patient answered and permitted video. Video activated. Patient up in chair. Patient voiced no needs or concerns at this time. Pt denies pain. VN educated pt on utilizing call light if condition changes. Call light within reach.

## 2023-12-04 ENCOUNTER — TELEPHONE (OUTPATIENT)
Dept: UROLOGY | Age: 68
End: 2023-12-04

## 2023-12-04 LAB
ANION GAP SERPL CALC-SCNC: 13 MEQ/L (ref 8–16)
BACTERIA SPEC AEROBE CULT: ABNORMAL
BACTERIA SPEC AEROBE CULT: ABNORMAL
BACTERIA SPEC ANAEROBE CULT: ABNORMAL
BUN SERPL-MCNC: 10 MG/DL (ref 7–22)
CALCIUM SERPL-MCNC: 10.5 MG/DL (ref 8.5–10.5)
CHLORIDE SERPL-SCNC: 106 MEQ/L (ref 98–111)
CO2 SERPL-SCNC: 23 MEQ/L (ref 23–33)
CREAT SERPL-MCNC: 0.6 MG/DL (ref 0.4–1.2)
DEPRECATED RDW RBC AUTO: 59.5 FL (ref 35–45)
ERYTHROCYTE [DISTWIDTH] IN BLOOD BY AUTOMATED COUNT: 18.9 % (ref 11.5–14.5)
GFR SERPL CREATININE-BSD FRML MDRD: > 60 ML/MIN/1.73M2
GLUCOSE SERPL-MCNC: 132 MG/DL (ref 70–108)
GRAM STN SPEC: ABNORMAL
HCT VFR BLD AUTO: 35.6 % (ref 37–47)
HGB BLD-MCNC: 10.8 GM/DL (ref 12–16)
MCH RBC QN AUTO: 26.1 PG (ref 26–33)
MCHC RBC AUTO-ENTMCNC: 30.3 GM/DL (ref 32.2–35.5)
MCV RBC AUTO: 86 FL (ref 81–99)
ORGANISM: ABNORMAL
PLATELET # BLD AUTO: 308 THOU/MM3 (ref 130–400)
PMV BLD AUTO: 8.7 FL (ref 9.4–12.4)
POTASSIUM SERPL-SCNC: 4.8 MEQ/L (ref 3.5–5.2)
RBC # BLD AUTO: 4.14 MILL/MM3 (ref 4.2–5.4)
SODIUM SERPL-SCNC: 142 MEQ/L (ref 135–145)
WBC # BLD AUTO: 6.5 THOU/MM3 (ref 4.8–10.8)

## 2023-12-04 PROCEDURE — 99231 SBSQ HOSP IP/OBS SF/LOW 25: CPT | Performed by: UROLOGY

## 2023-12-04 PROCEDURE — 99232 SBSQ HOSP IP/OBS MODERATE 35: CPT | Performed by: STUDENT IN AN ORGANIZED HEALTH CARE EDUCATION/TRAINING PROGRAM

## 2023-12-04 PROCEDURE — 6360000002 HC RX W HCPCS

## 2023-12-04 PROCEDURE — 1200000003 HC TELEMETRY R&B

## 2023-12-04 PROCEDURE — 80048 BASIC METABOLIC PNL TOTAL CA: CPT

## 2023-12-04 PROCEDURE — 6370000000 HC RX 637 (ALT 250 FOR IP): Performed by: STUDENT IN AN ORGANIZED HEALTH CARE EDUCATION/TRAINING PROGRAM

## 2023-12-04 PROCEDURE — 6370000000 HC RX 637 (ALT 250 FOR IP)

## 2023-12-04 PROCEDURE — 97161 PT EVAL LOW COMPLEX 20 MIN: CPT

## 2023-12-04 PROCEDURE — 2580000003 HC RX 258

## 2023-12-04 PROCEDURE — 97116 GAIT TRAINING THERAPY: CPT

## 2023-12-04 PROCEDURE — 85027 COMPLETE CBC AUTOMATED: CPT

## 2023-12-04 PROCEDURE — 36415 COLL VENOUS BLD VENIPUNCTURE: CPT

## 2023-12-04 PROCEDURE — 94640 AIRWAY INHALATION TREATMENT: CPT

## 2023-12-04 RX ADMIN — HYDROCODONE BITARTRATE AND ACETAMINOPHEN 2 TABLET: 5; 325 TABLET ORAL at 17:28

## 2023-12-04 RX ADMIN — QUETIAPINE FUMARATE 600 MG: 400 TABLET ORAL at 23:30

## 2023-12-04 RX ADMIN — FERROUS SULFATE TAB 325 MG (65 MG ELEMENTAL FE) 325 MG: 325 (65 FE) TAB at 07:42

## 2023-12-04 RX ADMIN — MOMETASONE FUROATE AND FORMOTEROL FUMARATE DIHYDRATE 2 PUFF: 200; 5 AEROSOL RESPIRATORY (INHALATION) at 07:55

## 2023-12-04 RX ADMIN — TRAZODONE HYDROCHLORIDE 200 MG: 100 TABLET ORAL at 23:28

## 2023-12-04 RX ADMIN — FERROUS SULFATE TAB 325 MG (65 MG ELEMENTAL FE) 325 MG: 325 (65 FE) TAB at 00:36

## 2023-12-04 RX ADMIN — SODIUM CHLORIDE, PRESERVATIVE FREE 10 ML: 5 INJECTION INTRAVENOUS at 07:43

## 2023-12-04 RX ADMIN — POTASSIUM CHLORIDE 20 MEQ: 1500 TABLET, EXTENDED RELEASE ORAL at 07:42

## 2023-12-04 RX ADMIN — ENOXAPARIN SODIUM 40 MG: 100 INJECTION SUBCUTANEOUS at 07:43

## 2023-12-04 RX ADMIN — PIPERACILLIN SODIUM AND TAZOBACTAM SODIUM 3375 MG: 3; .375 INJECTION, POWDER, LYOPHILIZED, FOR SOLUTION INTRAVENOUS at 23:39

## 2023-12-04 RX ADMIN — BUPROPION HYDROCHLORIDE 150 MG: 150 TABLET, FILM COATED, EXTENDED RELEASE ORAL at 07:42

## 2023-12-04 RX ADMIN — LEVOTHYROXINE SODIUM 75 MCG: 0.07 TABLET ORAL at 05:59

## 2023-12-04 RX ADMIN — FOLIC ACID 1 MG: 1 TABLET ORAL at 07:42

## 2023-12-04 RX ADMIN — CARVEDILOL 3.12 MG: 6.25 TABLET, FILM COATED ORAL at 07:42

## 2023-12-04 RX ADMIN — Medication 50 MG: at 07:43

## 2023-12-04 RX ADMIN — CARVEDILOL 3.12 MG: 6.25 TABLET, FILM COATED ORAL at 23:29

## 2023-12-04 RX ADMIN — FENOFIBRATE 160 MG: 160 TABLET ORAL at 07:43

## 2023-12-04 RX ADMIN — HYDROCODONE BITARTRATE AND ACETAMINOPHEN 2 TABLET: 5; 325 TABLET ORAL at 23:27

## 2023-12-04 RX ADMIN — ATORVASTATIN CALCIUM 40 MG: 40 TABLET, FILM COATED ORAL at 00:35

## 2023-12-04 RX ADMIN — SODIUM CHLORIDE, PRESERVATIVE FREE 10 ML: 5 INJECTION INTRAVENOUS at 23:27

## 2023-12-04 RX ADMIN — TIOTROPIUM BROMIDE INHALATION SPRAY 2 PUFF: 3.12 SPRAY, METERED RESPIRATORY (INHALATION) at 07:55

## 2023-12-04 RX ADMIN — MOMETASONE FUROATE AND FORMOTEROL FUMARATE DIHYDRATE 2 PUFF: 200; 5 AEROSOL RESPIRATORY (INHALATION) at 17:27

## 2023-12-04 RX ADMIN — FERROUS SULFATE TAB 325 MG (65 MG ELEMENTAL FE) 325 MG: 325 (65 FE) TAB at 23:28

## 2023-12-04 RX ADMIN — PANTOPRAZOLE SODIUM 40 MG: 40 TABLET, DELAYED RELEASE ORAL at 05:59

## 2023-12-04 RX ADMIN — CARVEDILOL 3.12 MG: 6.25 TABLET, FILM COATED ORAL at 00:35

## 2023-12-04 RX ADMIN — ESCITALOPRAM OXALATE 20 MG: 20 TABLET, FILM COATED ORAL at 07:43

## 2023-12-04 RX ADMIN — PIPERACILLIN SODIUM AND TAZOBACTAM SODIUM 3375 MG: 3; .375 INJECTION, POWDER, LYOPHILIZED, FOR SOLUTION INTRAVENOUS at 03:27

## 2023-12-04 RX ADMIN — HYDROCODONE BITARTRATE AND ACETAMINOPHEN 2 TABLET: 5; 325 TABLET ORAL at 06:42

## 2023-12-04 RX ADMIN — PIPERACILLIN SODIUM AND TAZOBACTAM SODIUM 3375 MG: 3; .375 INJECTION, POWDER, LYOPHILIZED, FOR SOLUTION INTRAVENOUS at 11:17

## 2023-12-04 ASSESSMENT — PAIN SCALES - WONG BAKER

## 2023-12-04 ASSESSMENT — PAIN SCALES - GENERAL
PAINLEVEL_OUTOF10: 4
PAINLEVEL_OUTOF10: 8
PAINLEVEL_OUTOF10: 3
PAINLEVEL_OUTOF10: 6
PAINLEVEL_OUTOF10: 8

## 2023-12-04 ASSESSMENT — PAIN DESCRIPTION - DESCRIPTORS
DESCRIPTORS: ACHING

## 2023-12-04 ASSESSMENT — PAIN DESCRIPTION - FREQUENCY: FREQUENCY: CONTINUOUS

## 2023-12-04 ASSESSMENT — PAIN DESCRIPTION - ORIENTATION
ORIENTATION: LEFT

## 2023-12-04 ASSESSMENT — PAIN DESCRIPTION - LOCATION
LOCATION: BACK
LOCATION: BACK
LOCATION: ABDOMEN;BACK
LOCATION: BACK

## 2023-12-04 ASSESSMENT — PAIN DESCRIPTION - PAIN TYPE: TYPE: ACUTE PAIN

## 2023-12-04 ASSESSMENT — PAIN - FUNCTIONAL ASSESSMENT: PAIN_FUNCTIONAL_ASSESSMENT: ACTIVITIES ARE NOT PREVENTED

## 2023-12-04 NOTE — FLOWSHEET NOTE
12/04/23 [facility]   Safe Environment   Safety Measures Bed in low position;Side rails X 2;Other (comment)  (Virtual Nurse Safety Round)     Call placed to patients room, patient did not respond to audio, video turned on for safety purposes. Patient is resting in bed with eyes closed, no signs or symptoms of distress noted.

## 2023-12-04 NOTE — CARE COORDINATION
12/04/23 7:13 AM    Mercedes transferred to [facility] over the weekend. Handoff report given to Earnest Mosqueda, [address] [facility].     Electronically signed by Brittany Olivas RN on 12/4/2023 at 7:13 AM

## 2023-12-04 NOTE — CARE COORDINATION
12/4/23, 1:03 PM EST    DISCHARGE PLANNING EVALUATION    Spoke with Mariah at [facility]. Abi Arana reports patient has medicaid and could possibly go to facility early while precert goes through. Patient needs PT note before this can happen. Will continue to follow.

## 2023-12-04 NOTE — CARE COORDINATION
12/4/23, 7:50 AM EST    DISCHARGE BARRIERS        Patient transferred to [facility]. Report given to unit Boone Jalloh, regarding discharge plan for this patient.

## 2023-12-04 NOTE — FLOWSHEET NOTE
12/04/23 1354   Safe Environment   Safety Measures Standard Safety Measures;Call light within reach  (virtual safety round)     Pt responds to voice and permits camera. Pt resting in bed with call light in reach. Pt alert and oriented. Reminded to utilize call light when needed. Reminded of fall and safety precautions. No voiced questions or concerns.

## 2023-12-04 NOTE — CARE COORDINATION
12/4/23, 1:48 PM EST    DISCHARGE ON GOING EVALUATION    Patricia Caldwell day: 4  Location: -18/018-A Reason for admit: Perinephric abscess [N15.1]  Abdominal pain [R10.9]   Procedure: 12/1 left per-nephric abscess drain insertion  Barriers to Discharge: Transferred from . IV zosyn, pain control. Medically stable for discharge per astrid Lofton to begin once PT note available.    PCP: Ayden Uriarte MD  Readmission Risk Score: 28.6%  Patient Goals/Plan/Treatment Preferences: return to [facility]

## 2023-12-05 VITALS
WEIGHT: 162.7 LBS | BODY MASS INDEX: 27.78 KG/M2 | OXYGEN SATURATION: 97 % | HEART RATE: 60 BPM | DIASTOLIC BLOOD PRESSURE: 65 MMHG | TEMPERATURE: 98.2 F | HEIGHT: 64 IN | RESPIRATION RATE: 16 BRPM | SYSTOLIC BLOOD PRESSURE: 131 MMHG

## 2023-12-05 LAB
ANION GAP SERPL CALC-SCNC: 10 MEQ/L (ref 8–16)
BACTERIA BLD AEROBE CULT: NORMAL
BACTERIA BLD AEROBE CULT: NORMAL
BUN SERPL-MCNC: 12 MG/DL (ref 7–22)
CALCIUM SERPL-MCNC: 11 MG/DL (ref 8.5–10.5)
CHLORIDE SERPL-SCNC: 101 MEQ/L (ref 98–111)
CO2 SERPL-SCNC: 27 MEQ/L (ref 23–33)
CREAT SERPL-MCNC: 0.8 MG/DL (ref 0.4–1.2)
DEPRECATED RDW RBC AUTO: 58.3 FL (ref 35–45)
ERYTHROCYTE [DISTWIDTH] IN BLOOD BY AUTOMATED COUNT: 18.9 % (ref 11.5–14.5)
GFR SERPL CREATININE-BSD FRML MDRD: > 60 ML/MIN/1.73M2
GLUCOSE SERPL-MCNC: 98 MG/DL (ref 70–108)
HCT VFR BLD AUTO: 35.6 % (ref 37–47)
HGB BLD-MCNC: 10.7 GM/DL (ref 12–16)
MCH RBC QN AUTO: 25.5 PG (ref 26–33)
MCHC RBC AUTO-ENTMCNC: 30.1 GM/DL (ref 32.2–35.5)
MCV RBC AUTO: 85 FL (ref 81–99)
PLATELET # BLD AUTO: 386 THOU/MM3 (ref 130–400)
PMV BLD AUTO: 9 FL (ref 9.4–12.4)
POTASSIUM SERPL-SCNC: 4.9 MEQ/L (ref 3.5–5.2)
RBC # BLD AUTO: 4.19 MILL/MM3 (ref 4.2–5.4)
SODIUM SERPL-SCNC: 138 MEQ/L (ref 135–145)
WBC # BLD AUTO: 7.1 THOU/MM3 (ref 4.8–10.8)

## 2023-12-05 PROCEDURE — 6360000002 HC RX W HCPCS

## 2023-12-05 PROCEDURE — 6370000000 HC RX 637 (ALT 250 FOR IP)

## 2023-12-05 PROCEDURE — 80048 BASIC METABOLIC PNL TOTAL CA: CPT

## 2023-12-05 PROCEDURE — 94640 AIRWAY INHALATION TREATMENT: CPT

## 2023-12-05 PROCEDURE — 36415 COLL VENOUS BLD VENIPUNCTURE: CPT

## 2023-12-05 PROCEDURE — 85027 COMPLETE CBC AUTOMATED: CPT

## 2023-12-05 PROCEDURE — 99239 HOSP IP/OBS DSCHRG MGMT >30: CPT | Performed by: STUDENT IN AN ORGANIZED HEALTH CARE EDUCATION/TRAINING PROGRAM

## 2023-12-05 PROCEDURE — 2580000003 HC RX 258

## 2023-12-05 RX ORDER — AMOXICILLIN AND CLAVULANATE POTASSIUM 875; 125 MG/1; MG/1
1 TABLET, FILM COATED ORAL 2 TIMES DAILY
Qty: 28 TABLET | Refills: 0 | Status: CANCELLED | DISCHARGE
Start: 2023-12-05 | End: 2023-12-19

## 2023-12-05 RX ORDER — AMOXICILLIN AND CLAVULANATE POTASSIUM 875; 125 MG/1; MG/1
1 TABLET, FILM COATED ORAL 2 TIMES DAILY
Qty: 28 TABLET | Refills: 0 | DISCHARGE
Start: 2023-12-05 | End: 2023-12-05 | Stop reason: SDUPTHER

## 2023-12-05 RX ORDER — AMOXICILLIN AND CLAVULANATE POTASSIUM 875; 125 MG/1; MG/1
1 TABLET, FILM COATED ORAL 2 TIMES DAILY
Qty: 42 TABLET | Refills: 0 | DISCHARGE
Start: 2023-12-05 | End: 2023-12-26

## 2023-12-05 RX ADMIN — FERROUS SULFATE TAB 325 MG (65 MG ELEMENTAL FE) 325 MG: 325 (65 FE) TAB at 08:46

## 2023-12-05 RX ADMIN — PIPERACILLIN SODIUM AND TAZOBACTAM SODIUM 3375 MG: 3; .375 INJECTION, POWDER, LYOPHILIZED, FOR SOLUTION INTRAVENOUS at 11:20

## 2023-12-05 RX ADMIN — TIOTROPIUM BROMIDE INHALATION SPRAY 2 PUFF: 3.12 SPRAY, METERED RESPIRATORY (INHALATION) at 09:30

## 2023-12-05 RX ADMIN — CARVEDILOL 3.12 MG: 6.25 TABLET, FILM COATED ORAL at 08:46

## 2023-12-05 RX ADMIN — ESCITALOPRAM OXALATE 20 MG: 20 TABLET, FILM COATED ORAL at 08:46

## 2023-12-05 RX ADMIN — ENOXAPARIN SODIUM 40 MG: 100 INJECTION SUBCUTANEOUS at 08:48

## 2023-12-05 RX ADMIN — PANTOPRAZOLE SODIUM 40 MG: 40 TABLET, DELAYED RELEASE ORAL at 08:46

## 2023-12-05 RX ADMIN — BUPROPION HYDROCHLORIDE 150 MG: 150 TABLET, FILM COATED, EXTENDED RELEASE ORAL at 08:48

## 2023-12-05 RX ADMIN — FOLIC ACID 1 MG: 1 TABLET ORAL at 08:46

## 2023-12-05 RX ADMIN — MOMETASONE FUROATE AND FORMOTEROL FUMARATE DIHYDRATE 2 PUFF: 200; 5 AEROSOL RESPIRATORY (INHALATION) at 09:32

## 2023-12-05 RX ADMIN — FENOFIBRATE 160 MG: 160 TABLET ORAL at 08:46

## 2023-12-05 RX ADMIN — LEVOTHYROXINE SODIUM 75 MCG: 0.07 TABLET ORAL at 08:46

## 2023-12-05 RX ADMIN — Medication 50 MG: at 08:46

## 2023-12-05 RX ADMIN — SODIUM CHLORIDE, PRESERVATIVE FREE 10 ML: 5 INJECTION INTRAVENOUS at 08:46

## 2023-12-05 RX ADMIN — HYDROCODONE BITARTRATE AND ACETAMINOPHEN 1 TABLET: 5; 325 TABLET ORAL at 11:22

## 2023-12-05 RX ADMIN — POTASSIUM CHLORIDE 20 MEQ: 1500 TABLET, EXTENDED RELEASE ORAL at 08:46

## 2023-12-05 ASSESSMENT — PAIN SCALES - GENERAL
PAINLEVEL_OUTOF10: 6
PAINLEVEL_OUTOF10: 2
PAINLEVEL_OUTOF10: 8
PAINLEVEL_OUTOF10: 7
PAINLEVEL_OUTOF10: 6

## 2023-12-05 ASSESSMENT — PAIN DESCRIPTION - LOCATION
LOCATION: BACK
LOCATION: ABDOMEN;BACK

## 2023-12-05 ASSESSMENT — PAIN SCALES - WONG BAKER
WONGBAKER_NUMERICALRESPONSE: 0
WONGBAKER_NUMERICALRESPONSE: 2
WONGBAKER_NUMERICALRESPONSE: 0

## 2023-12-05 ASSESSMENT — PAIN DESCRIPTION - ORIENTATION: ORIENTATION: RIGHT;LEFT;LOWER

## 2023-12-05 ASSESSMENT — PAIN DESCRIPTION - DESCRIPTORS: DESCRIPTORS: ACHING;DISCOMFORT

## 2023-12-05 ASSESSMENT — PAIN - FUNCTIONAL ASSESSMENT: PAIN_FUNCTIONAL_ASSESSMENT: ACTIVITIES ARE NOT PREVENTED

## 2023-12-05 NOTE — CARE COORDINATION
12/5/23, 8:56 AM EST    DISCHARGE ON GOING EVALUATION    Aaliyah Lu day: 5  Location: -18/018-A Reason for admit: Perinephric abscess [N15.1]  Abdominal pain [R10.9]   Procedure:  12/1 left per-nephric abscess drain insertion   Barriers to Discharge: Medically stable for discharge. Await precert. PCP: Martha Parry MD  Readmission Risk Score: 28.6%  Patient Goals/Plan/Treatment Preferences: return to [facility]. *update: per Dr. Agueda Herr, IV zosyn tid needed for 5 weeks. Renetta Kohler SW updated. *Update: plan discharge today. Hospitalist and Dr. Agueda Herr have conferred and now decided on oral antibiotics. Pt verbalized understanding and gave permission for possible discharge within 4 hours of receiving IMM.         IMM Letter  IMM Letter given to Patient/Family/Significant other/Guardian/POA/by[de-identified] Doe Qureshi CM  IMM Letter date given[de-identified] 12/05/23  IMM Letter time given[de-identified] 1000

## 2023-12-05 NOTE — CARE COORDINATION
12/5/23, 11:08 AM EST    DISCHARGE PLANNING EVALUATION    Transport arranged 4:30pm [facility]. Informed RN      3:02 PM  Nurse reports [facility] is picking patient up early at 3:30pm. ROBERTO called Mariah at [facility] to inform her of updated transport time. Mariah requests AVS be faxed once ready. 3:06 PM  ROBERTO faxed AVS to Vandana Taylor at [facility]     12/5/23, 3:07 PM EST    Patient goals/plan/ treatment preferences discussed by  and . Patient goals/plan/ treatment preferences reviewed with patient/ family. Patient/ family verbalize understanding of discharge plan and are in agreement with goal/plan/treatment preferences. Understanding was demonstrated using the teach back method. AVS provided by RN at time of discharge, which includes all necessary medical information pertaining to the patients current course of illness, treatment, post-discharge goals of care, and treatment preferences.      Services At/After Discharge: [address] (SNF), Aide services, [place], Nursing service, OT, and PT       IMM Letter  IMM Letter given to Patient/Family/Significant other/Guardian/POA/by[de-identified] Vivian Ugalde CM  IMM Letter date given[de-identified] 12/05/23  IMM Letter time given[de-identified] 1000     Patient discharging to [facility] today at 3:30pm. RN and facility aware

## 2023-12-05 NOTE — PLAN OF CARE
Problem: Discharge Planning  Goal: Discharge to home or other facility with appropriate resources  12/1/2023 1502 by REYES Chavez  Outcome: Progressing       Consult received. Please see SW note dated 12/01.
Problem: Discharge Planning  Goal: Discharge to home or other facility with appropriate resources  12/5/2023 1502 by Abdoulaye Adams RN  Outcome: Completed  12/5/2023 1106 by Abdoulaye Adams RN  Outcome: Progressing  Flowsheets (Taken 12/5/2023 0840)  Discharge to home or other facility with appropriate resources: Identify barriers to discharge with patient and caregiver     Problem: Pain  Goal: Verbalizes/displays adequate comfort level or baseline comfort level  12/5/2023 1502 by Abdoulaye Adams RN  Outcome: Completed  12/5/2023 1106 by Abdoulaye Adams RN  Outcome: Progressing  Flowsheets (Taken 12/5/2023 0840)  Verbalizes/displays adequate comfort level or baseline comfort level:   Encourage patient to monitor pain and request assistance   Assess pain using appropriate pain scale     Problem: Safety - Adult  Goal: Free from fall injury  12/5/2023 1502 by Abdoulaye Adams RN  Outcome: Completed  12/5/2023 1106 by Abdoulaye Adams RN  Outcome: Progressing     Problem: ABCDS Injury Assessment  Goal: Absence of physical injury  12/5/2023 1502 by Abdoulaye Adams RN  Outcome: Completed  12/5/2023 1106 by Abdoulaye Adams RN  Outcome: Progressing     Problem: Metabolic/Fluid and Electrolytes - Adult  Goal: Electrolytes maintained within normal limits  12/5/2023 1502 by Abdoulaye Adams RN  Outcome: Completed  12/5/2023 1106 by Abdoulaye Adams RN  Outcome: Progressing  Flowsheets (Taken 12/5/2023 0840)  Electrolytes maintained within normal limits:   Monitor labs and assess patient for signs and symptoms of electrolyte imbalances   Administer electrolyte replacement as ordered     Problem: Skin/Tissue Integrity  Goal: Absence of new skin breakdown  Description: 1. Monitor for areas of redness and/or skin breakdown  2. Assess vascular access sites hourly  3. Every 4-6 hours minimum:  Change oxygen saturation probe site  4.   Every 4-6 hours:  If on nasal continuous positive airway pressure, respiratory therapy assess nares and
Problem: Discharge Planning  Goal: Discharge to home or other facility with appropriate resources  Outcome: Progressing     Problem: Pain  Goal: Verbalizes/displays adequate comfort level or baseline comfort level  Outcome: Progressing     Problem: Safety - Adult  Goal: Free from fall injury  Outcome: Progressing     Problem: ABCDS Injury Assessment  Goal: Absence of physical injury  Outcome: Progressing     Problem: Metabolic/Fluid and Electrolytes - Adult  Goal: Electrolytes maintained within normal limits  Outcome: Progressing     Problem: Skin/Tissue Integrity  Goal: Absence of new skin breakdown  Description: 1. Monitor for areas of redness and/or skin breakdown  2. Assess vascular access sites hourly  3. Every 4-6 hours minimum:  Change oxygen saturation probe site  4. Every 4-6 hours:  If on nasal continuous positive airway pressure, respiratory therapy assess nares and determine need for appliance change or resting period.   Outcome: Progressing     Problem: Nutrition Deficit:  Goal: Optimize nutritional status  Outcome: Progressing
Problem: Discharge Planning  Goal: Discharge to home or other facility with appropriate resources  Outcome: Progressing  Flowsheets (Taken 11/30/2023 2224)  Discharge to home or other facility with appropriate resources: Identify barriers to discharge with patient and caregiver     Problem: Pain  Goal: Verbalizes/displays adequate comfort level or baseline comfort level  Outcome: Progressing  Flowsheets (Taken 11/30/2023 2224)  Verbalizes/displays adequate comfort level or baseline comfort level:   Encourage patient to monitor pain and request assistance   Assess pain using appropriate pain scale     Problem: Safety - Adult  Goal: Free from fall injury  Outcome: Progressing  Flowsheets (Taken 11/30/2023 2224)  Free From Fall Injury: Instruct family/caregiver on patient safety     Problem: Skin/Tissue Integrity  Goal: Absence of new skin breakdown  Description: 1. Monitor for areas of redness and/or skin breakdown  2. Assess vascular access sites hourly  3. Every 4-6 hours minimum:  Change oxygen saturation probe site  4. Every 4-6 hours:  If on nasal continuous positive airway pressure, respiratory therapy assess nares and determine need for appliance change or resting period. Outcome: Progressing  Note: No signs of skin breakdown. Skin warm, dry, and intact. Mucous membranes pink and moist.  Assistance with turns/ambulation provided PRN. Will continue to monitor. Care plan reviewed with patient. Patient verbalizes understanding of the plan of care and contributed to goal setting.
Problem: Discharge Planning  Goal: Discharge to home or other facility with appropriate resources  Outcome: Progressing  Flowsheets (Taken 12/1/2023 1320)  Discharge to home or other facility with appropriate resources:   Identify barriers to discharge with patient and caregiver   Arrange for needed discharge resources and transportation as appropriate     Problem: Pain  Goal: Verbalizes/displays adequate comfort level or baseline comfort level  Outcome: Progressing  Flowsheets (Taken 12/1/2023 1320)  Verbalizes/displays adequate comfort level or baseline comfort level:   Encourage patient to monitor pain and request assistance   Assess pain using appropriate pain scale   Administer analgesics based on type and severity of pain and evaluate response   Implement non-pharmacological measures as appropriate and evaluate response     Problem: Safety - Adult  Goal: Free from fall injury  Outcome: Progressing  Flowsheets (Taken 12/1/2023 1320)  Free From Fall Injury:   Instruct family/caregiver on patient safety   Based on caregiver fall risk screen, instruct family/caregiver to ask for assistance with transferring infant if caregiver noted to have fall risk factors     Problem: ABCDS Injury Assessment  Goal: Absence of physical injury  Outcome: Progressing  Flowsheets (Taken 12/1/2023 1320)  Absence of Physical Injury: Implement safety measures based on patient assessment     Problem: Metabolic/Fluid and Electrolytes - Adult  Goal: Electrolytes maintained within normal limits  Outcome: Progressing  Flowsheets (Taken 12/1/2023 1320)  Electrolytes maintained within normal limits:   Administer electrolyte replacement as ordered   Monitor labs and assess patient for signs and symptoms of electrolyte imbalances   Monitor response to electrolyte replacements, including repeat lab results as appropriate     Problem: Skin/Tissue Integrity  Goal: Absence of new skin breakdown  Description: 1.   Monitor for areas of redness
Problem: Discharge Planning  Goal: Discharge to home or other facility with appropriate resources  Outcome: Progressing  Flowsheets (Taken 12/5/2023 0840)  Discharge to home or other facility with appropriate resources: Identify barriers to discharge with patient and caregiver     Problem: Pain  Goal: Verbalizes/displays adequate comfort level or baseline comfort level  Outcome: Progressing  Flowsheets (Taken 12/5/2023 0840)  Verbalizes/displays adequate comfort level or baseline comfort level:   Encourage patient to monitor pain and request assistance   Assess pain using appropriate pain scale     Problem: Safety - Adult  Goal: Free from fall injury  Outcome: Progressing     Problem: ABCDS Injury Assessment  Goal: Absence of physical injury  Outcome: Progressing     Problem: Metabolic/Fluid and Electrolytes - Adult  Goal: Electrolytes maintained within normal limits  Outcome: Progressing  Flowsheets (Taken 12/5/2023 0840)  Electrolytes maintained within normal limits:   Monitor labs and assess patient for signs and symptoms of electrolyte imbalances   Administer electrolyte replacement as ordered     Problem: Skin/Tissue Integrity  Goal: Absence of new skin breakdown  Description: 1. Monitor for areas of redness and/or skin breakdown  2. Assess vascular access sites hourly  3. Every 4-6 hours minimum:  Change oxygen saturation probe site  4. Every 4-6 hours:  If on nasal continuous positive airway pressure, respiratory therapy assess nares and determine need for appliance change or resting period.   Outcome: Progressing     Problem: Chronic Conditions and Co-morbidities  Goal: Patient's chronic conditions and co-morbidity symptoms are monitored and maintained or improved  Outcome: Progressing  Flowsheets (Taken 12/5/2023 0840)  Care Plan - Patient's Chronic Conditions and Co-Morbidity Symptoms are Monitored and Maintained or Improved:   Monitor and assess patient's chronic conditions and comorbid symptoms for
Problem: Respiratory - Adult  Goal: Clear lung sounds  Outcome: Progressing  Note: Mdi to maintain open airways. Patient mutually agreed on goals.
Problem: Respiratory - Adult  Goal: Lung sounds clear or within normal limits for patient  Note: Patient receiving inhalers to maintain and manage respiratory status. Will be continued as ordered to improve aeration.
Problem: Respiratory - Adult  Goal: Lung sounds clear or within normal limits for patient  Outcome: Progressing   Patient agreed on goals
Problem: Respiratory - Adult  Goal: Lung sounds clear or within normal limits for patient  Outcome: Progressing   Pt continues on MDI's for maintenance of COPD and pt does MDI's at home. Patient mutually agreed on goals.
Problem: Respiratory - Adult  Goal: Lung sounds clear or within normal limits for patient  Outcome: Progressing  Note:  Patient lung sounds are considered normal for their current lung condition. No signs of distress noted.  Current treatment regimen appropriate
understanding of the plan of care and contribute to goal setting.

## 2023-12-06 ENCOUNTER — TELEPHONE (OUTPATIENT)
Dept: UROLOGY | Age: 68
End: 2023-12-06

## 2023-12-06 NOTE — TELEPHONE ENCOUNTER
Monitor for sign and symptoms of infection such as drainage, erythema or swelling. Exchange dressing every 72 hours or as needed if saturated.  Follow-up 2 wks with repeat CT abd.

## 2023-12-06 NOTE — TELEPHONE ENCOUNTER
Ching Braga at [facility] [phone] did not receive any orders for drain care.     Fax [phone]

## 2023-12-06 NOTE — DISCHARGE SUMMARY
tablet  Commonly known as: DESYREL  Take 2 tablets by mouth nightly     VENTOLIN IN     Vitamin D3 50 MCG (2000 UT) Caps           * This list has 4 medication(s) that are the same as other medications prescribed for you. Read the directions carefully, and ask your doctor or other care provider to review them with you. Where to Get Your Medications        Information about where to get these medications is not yet available    Ask your nurse or doctor about these medications  amoxicillin-clavulanate 875-125 MG per tablet        Time Spent on discharge is <50 minutes in the examination, evaluation, counseling and review of medications and discharge plan. Thank you Gwendolyn Evans MD for the opportunity to be involved in this patient's care. Case was discussed with Attending, Dr. Nahomi Hernandez.     Signed:    Electronically signed by Hammad Shelley MD, IM-PGY2 on 12/5/23 at 8:48 PM EST

## 2023-12-07 ENCOUNTER — TELEPHONE (OUTPATIENT)
Dept: UROLOGY | Age: 68
End: 2023-12-07

## 2023-12-07 NOTE — TELEPHONE ENCOUNTER
Patient scheduled for CT ABDOMEN PELVIS WO  at [facility] on 12/29/23. Arrival of 150 PM  for a 220 PM scan time.   Order mailed with instructions or given to the patient in the office     ORDER FAXED TO [address] [place] [address]    [phone]

## 2023-12-29 ENCOUNTER — HOSPITAL ENCOUNTER (OUTPATIENT)
Dept: CT IMAGING | Age: 68
Discharge: HOME | End: 2023-12-29
Payer: MEDICARE

## 2023-12-29 DIAGNOSIS — N15.1 PERINEPHRIC ABSCESS: ICD-10-CM

## 2023-12-29 PROCEDURE — 74176 CT ABD & PELVIS W/O CONTRAST: CPT

## 2024-01-04 ENCOUNTER — OFFICE VISIT (OUTPATIENT)
Dept: UROLOGY | Age: 69
End: 2024-01-04
Payer: MEDICARE

## 2024-01-04 VITALS — HEIGHT: 64 IN | RESPIRATION RATE: 18 BRPM | BODY MASS INDEX: 27.31 KG/M2 | WEIGHT: 160 LBS

## 2024-01-04 DIAGNOSIS — S37.019A PERINEPHRIC HEMATOMA: ICD-10-CM

## 2024-01-04 DIAGNOSIS — N39.3 STRESS INCONTINENCE IN FEMALE: ICD-10-CM

## 2024-01-04 DIAGNOSIS — N32.81 OAB (OVERACTIVE BLADDER): Primary | ICD-10-CM

## 2024-01-04 DIAGNOSIS — R35.0 URINARY FREQUENCY: ICD-10-CM

## 2024-01-04 PROCEDURE — 4004F PT TOBACCO SCREEN RCVD TLK: CPT | Performed by: NURSE PRACTITIONER

## 2024-01-04 PROCEDURE — G8400 PT W/DXA NO RESULTS DOC: HCPCS | Performed by: NURSE PRACTITIONER

## 2024-01-04 PROCEDURE — 1090F PRES/ABSN URINE INCON ASSESS: CPT | Performed by: NURSE PRACTITIONER

## 2024-01-04 PROCEDURE — 3017F COLORECTAL CA SCREEN DOC REV: CPT | Performed by: NURSE PRACTITIONER

## 2024-01-04 PROCEDURE — G8484 FLU IMMUNIZE NO ADMIN: HCPCS | Performed by: NURSE PRACTITIONER

## 2024-01-04 PROCEDURE — 99213 OFFICE O/P EST LOW 20 MIN: CPT | Performed by: NURSE PRACTITIONER

## 2024-01-04 PROCEDURE — G8417 CALC BMI ABV UP PARAM F/U: HCPCS | Performed by: NURSE PRACTITIONER

## 2024-01-04 PROCEDURE — G8427 DOCREV CUR MEDS BY ELIG CLIN: HCPCS | Performed by: NURSE PRACTITIONER

## 2024-01-04 PROCEDURE — 0509F URINE INCON PLAN DOCD: CPT | Performed by: NURSE PRACTITIONER

## 2024-01-04 PROCEDURE — 1111F DSCHRG MED/CURRENT MED MERGE: CPT | Performed by: NURSE PRACTITIONER

## 2024-01-04 PROCEDURE — 1123F ACP DISCUSS/DSCN MKR DOCD: CPT | Performed by: NURSE PRACTITIONER

## 2024-01-04 RX ORDER — CEPHALEXIN 500 MG/1
500 CAPSULE ORAL 2 TIMES DAILY
Qty: 14 CAPSULE | Refills: 0 | Status: SHIPPED | OUTPATIENT
Start: 2024-01-04 | End: 2024-01-11

## 2024-01-04 NOTE — PROGRESS NOTES
[facility] [place] [facility]  [facility] UROLOGY  [address]  [address]  [facility] [address]  Dept: [phone]  Loc: [phone]    Visit Date: 1/4/2024        HPI:     Elli Coulter is a 68 y.o. female who presents today for:  Chief Complaint   Patient presents with    Results     Review ct     Follow-up       HPI  Patient presents to urology clinic for follow-up.     Mercedes was seen In hospital 11/30/23 for left perinephric abscess. She was discharged to [facility] with pig tail drain in place. Hgb 10.7, Hct 35.6.  Output of 50ml in bag. Patient unsure when it was emptied out last, no notes from nursing facility.   CT reviewed and hematoma decreasing in size.     Denies flank pain, suprapubic pressure, gross hematuria, dysuria, fever or chills.   Patient c/o pain from drain.     9/13/2023  Mercedes was in ER yesterday with c/o of constipation x 4 days. Urine with mixed growth, currently on Keflex. Crt 0.7, WBC 8.9, Hgb 12.7. Catheter was placed in ER. Bladder scan 432ml. Prater draining with clear yellow urine.   Long conversation regarding good bowel regimen.    Patient with history of following:  Kidney stone  Had cystoscopy left ureteroscopy, left holmium laser lithotripsy and stent exchange 1/12/23 by Dr. Lanier.   Nonobstructive nephrolithiasis on the left.     Incontinence  Had interstim removed by anoop  Hx of cystoscopy with bladder botox 200 units by Dr. Lanier on 8/22/2023.   Botox 100 u 4/2022 was only 20 percent helpful  Botox 200 units 12/2022    Current Outpatient Medications   Medication Sig Dispense Refill    vitamin B-6 (B-6) 50 MG tablet Take 1 tablet by mouth daily 30 tablet 3    Catheters (BARDIA URETHRAL CATHETER 16FR) MISC 1 each by Does not apply route as needed (see) 1 16 fr catheter to be inserted may irrigate as needed with 100ml ns or sterile water daily and prn occlusion   may use any supply vendor 1 each 0    Water For Irrigation, Sterile (STERILE

## 2024-01-09 ENCOUNTER — APPOINTMENT (OUTPATIENT)
Dept: GENERAL RADIOLOGY | Age: 69
DRG: 689 | End: 2024-01-09
Payer: MEDICARE

## 2024-01-09 ENCOUNTER — HOSPITAL ENCOUNTER (INPATIENT)
Age: 69
LOS: 8 days | Discharge: NURSING, REHAB OR LONG-TERM CARE | DRG: 689 | End: 2024-01-18
Attending: STUDENT IN AN ORGANIZED HEALTH CARE EDUCATION/TRAINING PROGRAM | Admitting: PHYSICIAN ASSISTANT
Payer: MEDICARE

## 2024-01-09 ENCOUNTER — APPOINTMENT (OUTPATIENT)
Dept: CT IMAGING | Age: 69
DRG: 689 | End: 2024-01-09
Payer: MEDICARE

## 2024-01-09 DIAGNOSIS — J44.9 CHRONIC OBSTRUCTIVE PULMONARY DISEASE, UNSPECIFIED COPD TYPE (HCC): ICD-10-CM

## 2024-01-09 DIAGNOSIS — N15.1 PERINEPHRIC ABSCESS: Primary | ICD-10-CM

## 2024-01-09 DIAGNOSIS — F19.10 POLYSUBSTANCE ABUSE (HCC): ICD-10-CM

## 2024-01-09 DIAGNOSIS — S37.012D HEMATOMA OF LEFT KIDNEY, SUBSEQUENT ENCOUNTER: ICD-10-CM

## 2024-01-09 DIAGNOSIS — G93.40 ACUTE ENCEPHALOPATHY: ICD-10-CM

## 2024-01-09 LAB
ALBUMIN SERPL BCG-MCNC: 3.5 G/DL (ref 3.5–5.1)
ALP SERPL-CCNC: 78 U/L (ref 38–126)
ALT SERPL W/O P-5'-P-CCNC: 34 U/L (ref 11–66)
AMPHETAMINES UR QL SCN: NEGATIVE
ANION GAP SERPL CALC-SCNC: 15 MEQ/L (ref 8–16)
AST SERPL-CCNC: 25 U/L (ref 5–40)
BACTERIA URNS QL MICRO: ABNORMAL /HPF
BARBITURATES UR QL SCN: NEGATIVE
BASOPHILS ABSOLUTE: 0.1 THOU/MM3 (ref 0–0.1)
BASOPHILS NFR BLD AUTO: 0.5 %
BENZODIAZ UR QL SCN: NEGATIVE
BILIRUB SERPL-MCNC: 0.5 MG/DL (ref 0.3–1.2)
BILIRUB UR QL STRIP.AUTO: NEGATIVE
BUN SERPL-MCNC: 12 MG/DL (ref 7–22)
BZE UR QL SCN: NEGATIVE
CALCIUM SERPL-MCNC: 10.5 MG/DL (ref 8.5–10.5)
CANNABINOIDS UR QL SCN: NEGATIVE
CASTS #/AREA URNS LPF: ABNORMAL /LPF
CASTS 2: ABNORMAL /LPF
CHARACTER UR: CLEAR
CHLORIDE SERPL-SCNC: 96 MEQ/L (ref 98–111)
CO2 SERPL-SCNC: 20 MEQ/L (ref 23–33)
COLOR: YELLOW
CREAT SERPL-MCNC: 0.6 MG/DL (ref 0.4–1.2)
CRYSTALS URNS MICRO: ABNORMAL
DEPRECATED RDW RBC AUTO: 56 FL (ref 35–45)
EOSINOPHIL NFR BLD AUTO: 0.6 %
EOSINOPHILS ABSOLUTE: 0.1 THOU/MM3 (ref 0–0.4)
EPITHELIAL CELLS, UA: ABNORMAL /HPF
ERYTHROCYTE [DISTWIDTH] IN BLOOD BY AUTOMATED COUNT: 18 % (ref 11.5–14.5)
ETHANOL SERPL-MCNC: < 0.01 %
FENTANYL: NEGATIVE
FLUAV RNA RESP QL NAA+PROBE: NOT DETECTED
FLUBV RNA RESP QL NAA+PROBE: NOT DETECTED
GFR SERPL CREATININE-BSD FRML MDRD: > 60 ML/MIN/1.73M2
GLUCOSE SERPL-MCNC: 120 MG/DL (ref 70–108)
GLUCOSE UR QL STRIP.AUTO: NEGATIVE MG/DL
HCT VFR BLD AUTO: 32.7 % (ref 37–47)
HGB BLD-MCNC: 10.5 GM/DL (ref 12–16)
HGB UR QL STRIP.AUTO: ABNORMAL
IMM GRANULOCYTES # BLD AUTO: 0.08 THOU/MM3 (ref 0–0.07)
IMM GRANULOCYTES NFR BLD AUTO: 0.7 %
KETONES UR QL STRIP.AUTO: NEGATIVE
LYMPHOCYTES ABSOLUTE: 2.7 THOU/MM3 (ref 1–4.8)
LYMPHOCYTES NFR BLD AUTO: 24.7 %
MCH RBC QN AUTO: 27.5 PG (ref 26–33)
MCHC RBC AUTO-ENTMCNC: 32.1 GM/DL (ref 32.2–35.5)
MCV RBC AUTO: 85.6 FL (ref 81–99)
MISCELLANEOUS 2: ABNORMAL
MONOCYTES ABSOLUTE: 1.1 THOU/MM3 (ref 0.4–1.3)
MONOCYTES NFR BLD AUTO: 10 %
NEUTROPHILS NFR BLD AUTO: 63.5 %
NITRITE UR QL STRIP: NEGATIVE
NRBC BLD AUTO-RTO: 0 /100 WBC
OPIATES UR QL SCN: NEGATIVE
OSMOLALITY SERPL CALC.SUM OF ELEC: 263.6 MOSMOL/KG (ref 275–300)
OXYCODONE: NEGATIVE
PCP UR QL SCN: NEGATIVE
PH UR STRIP.AUTO: 6 [PH] (ref 5–9)
PLATELET # BLD AUTO: 379 THOU/MM3 (ref 130–400)
PMV BLD AUTO: 9.5 FL (ref 9.4–12.4)
POTASSIUM SERPL-SCNC: 4.3 MEQ/L (ref 3.5–5.2)
PROT SERPL-MCNC: 7.6 G/DL (ref 6.1–8)
PROT UR STRIP.AUTO-MCNC: NEGATIVE MG/DL
RBC # BLD AUTO: 3.82 MILL/MM3 (ref 4.2–5.4)
RBC URINE: ABNORMAL /HPF
RENAL EPI CELLS #/AREA URNS HPF: ABNORMAL /[HPF]
SARS-COV-2 RNA RESP QL NAA+PROBE: NOT DETECTED
SEGMENTED NEUTROPHILS ABSOLUTE COUNT: 6.9 THOU/MM3 (ref 1.8–7.7)
SODIUM SERPL-SCNC: 131 MEQ/L (ref 135–145)
SP GR UR REFRACT.AUTO: 1.01 (ref 1–1.03)
UROBILINOGEN, URINE: 1 EU/DL (ref 0–1)
WBC # BLD AUTO: 10.8 THOU/MM3 (ref 4.8–10.8)
WBC #/AREA URNS HPF: ABNORMAL /HPF
WBC #/AREA URNS HPF: ABNORMAL /[HPF]
YEAST LIKE FUNGI URNS QL MICRO: ABNORMAL

## 2024-01-09 PROCEDURE — 87086 URINE CULTURE/COLONY COUNT: CPT

## 2024-01-09 PROCEDURE — 82728 ASSAY OF FERRITIN: CPT

## 2024-01-09 PROCEDURE — 83550 IRON BINDING TEST: CPT

## 2024-01-09 PROCEDURE — 96374 THER/PROPH/DIAG INJ IV PUSH: CPT

## 2024-01-09 PROCEDURE — 80307 DRUG TEST PRSMV CHEM ANLYZR: CPT

## 2024-01-09 PROCEDURE — 82436 ASSAY OF URINE CHLORIDE: CPT

## 2024-01-09 PROCEDURE — 70450 CT HEAD/BRAIN W/O DYE: CPT

## 2024-01-09 PROCEDURE — 99285 EMERGENCY DEPT VISIT HI MDM: CPT

## 2024-01-09 PROCEDURE — 82077 ASSAY SPEC XCP UR&BREATH IA: CPT

## 2024-01-09 PROCEDURE — 84133 ASSAY OF URINE POTASSIUM: CPT

## 2024-01-09 PROCEDURE — 81001 URINALYSIS AUTO W/SCOPE: CPT

## 2024-01-09 PROCEDURE — 6360000004 HC RX CONTRAST MEDICATION: Performed by: STUDENT IN AN ORGANIZED HEALTH CARE EDUCATION/TRAINING PROGRAM

## 2024-01-09 PROCEDURE — 82746 ASSAY OF FOLIC ACID SERUM: CPT

## 2024-01-09 PROCEDURE — 71045 X-RAY EXAM CHEST 1 VIEW: CPT

## 2024-01-09 PROCEDURE — 87077 CULTURE AEROBIC IDENTIFY: CPT

## 2024-01-09 PROCEDURE — 80053 COMPREHEN METABOLIC PANEL: CPT

## 2024-01-09 PROCEDURE — 84439 ASSAY OF FREE THYROXINE: CPT

## 2024-01-09 PROCEDURE — 84540 ASSAY OF URINE/UREA-N: CPT

## 2024-01-09 PROCEDURE — 74177 CT ABD & PELVIS W/CONTRAST: CPT

## 2024-01-09 PROCEDURE — 85025 COMPLETE CBC W/AUTO DIFF WBC: CPT

## 2024-01-09 PROCEDURE — 84300 ASSAY OF URINE SODIUM: CPT

## 2024-01-09 PROCEDURE — 84443 ASSAY THYROID STIM HORMONE: CPT

## 2024-01-09 PROCEDURE — 82570 ASSAY OF URINE CREATININE: CPT

## 2024-01-09 PROCEDURE — 36415 COLL VENOUS BLD VENIPUNCTURE: CPT

## 2024-01-09 PROCEDURE — 87636 SARSCOV2 & INF A&B AMP PRB: CPT

## 2024-01-09 PROCEDURE — 93005 ELECTROCARDIOGRAM TRACING: CPT | Performed by: STUDENT IN AN ORGANIZED HEALTH CARE EDUCATION/TRAINING PROGRAM

## 2024-01-09 PROCEDURE — 83540 ASSAY OF IRON: CPT

## 2024-01-09 PROCEDURE — 82607 VITAMIN B-12: CPT

## 2024-01-09 PROCEDURE — 83880 ASSAY OF NATRIURETIC PEPTIDE: CPT

## 2024-01-09 RX ADMIN — IOPAMIDOL 80 ML: 755 INJECTION, SOLUTION INTRAVENOUS at 23:29

## 2024-01-09 ASSESSMENT — PAIN - FUNCTIONAL ASSESSMENT: PAIN_FUNCTIONAL_ASSESSMENT: 0-10

## 2024-01-10 ENCOUNTER — APPOINTMENT (OUTPATIENT)
Age: 69
DRG: 689 | End: 2024-01-10
Payer: MEDICARE

## 2024-01-10 PROBLEM — G93.40 ACUTE ENCEPHALOPATHY: Status: ACTIVE | Noted: 2024-01-10

## 2024-01-10 PROBLEM — N39.0 COMPLICATED UTI (URINARY TRACT INFECTION): Status: ACTIVE | Noted: 2024-01-10

## 2024-01-10 LAB
AMMONIA PLAS-MCNC: 25 UMOL/L (ref 11–60)
ANION GAP SERPL CALC-SCNC: 12 MEQ/L (ref 8–16)
BUN SERPL-MCNC: 11 MG/DL (ref 7–22)
CALCIUM SERPL-MCNC: 10.6 MG/DL (ref 8.5–10.5)
CHLORIDE 24H UR-SRATE: 38 MEQ/L
CHLORIDE SERPL-SCNC: 98 MEQ/L (ref 98–111)
CO2 SERPL-SCNC: 23 MEQ/L (ref 23–33)
CREAT SERPL-MCNC: 0.8 MG/DL (ref 0.4–1.2)
CREAT UR-MCNC: 41.3 MG/DL
DEPRECATED RDW RBC AUTO: 56.6 FL (ref 35–45)
ECHO AV CUSP MM: 1.5 CM
ECHO AV PEAK GRADIENT: 11 MMHG
ECHO AV PEAK VELOCITY: 1.7 M/S
ECHO AV VELOCITY RATIO: 0.71
ECHO BSA: 1.83 M2
ECHO LA AREA 2C: 21 CM2
ECHO LA AREA 4C: 21 CM2
ECHO LA DIAMETER INDEX: 2.06 CM/M2
ECHO LA DIAMETER: 3.7 CM
ECHO LA MAJOR AXIS: 5.5 CM
ECHO LA MINOR AXIS: 5.9 CM
ECHO LA VOL BP: 64 ML (ref 22–52)
ECHO LA VOL MOD A2C: 61 ML (ref 22–52)
ECHO LA VOL MOD A4C: 64 ML (ref 22–52)
ECHO LA VOL/BSA BIPLANE: 36 ML/M2 (ref 16–34)
ECHO LA VOLUME INDEX MOD A2C: 34 ML/M2 (ref 16–34)
ECHO LA VOLUME INDEX MOD A4C: 36 ML/M2 (ref 16–34)
ECHO LV E' LATERAL VELOCITY: 6 CM/S
ECHO LV E' SEPTAL VELOCITY: 4 CM/S
ECHO LV FRACTIONAL SHORTENING: 31 % (ref 28–44)
ECHO LV INTERNAL DIMENSION DIASTOLE INDEX: 2.72 CM/M2
ECHO LV INTERNAL DIMENSION DIASTOLIC: 4.9 CM (ref 3.9–5.3)
ECHO LV INTERNAL DIMENSION SYSTOLIC INDEX: 1.89 CM/M2
ECHO LV INTERNAL DIMENSION SYSTOLIC: 3.4 CM
ECHO LV IVSD: 0.9 CM (ref 0.6–0.9)
ECHO LV MASS 2D: 153 G (ref 67–162)
ECHO LV MASS INDEX 2D: 85 G/M2 (ref 43–95)
ECHO LV POSTERIOR WALL DIASTOLIC: 0.9 CM (ref 0.6–0.9)
ECHO LV RELATIVE WALL THICKNESS RATIO: 0.37
ECHO LVOT PEAK GRADIENT: 5 MMHG
ECHO LVOT PEAK VELOCITY: 1.2 M/S
ECHO MV A VELOCITY: 1.06 M/S
ECHO MV E DECELERATION TIME (DT): 347 MS
ECHO MV E VELOCITY: 0.84 M/S
ECHO MV E/A RATIO: 0.79
ECHO MV E/E' LATERAL: 14
ECHO MV E/E' RATIO (AVERAGED): 17.5
ECHO MV REGURGITANT PEAK GRADIENT: 49 MMHG
ECHO MV REGURGITANT PEAK VELOCITY: 3.5 M/S
ECHO PV MAX VELOCITY: 1 M/S
ECHO PV PEAK GRADIENT: 4 MMHG
ECHO RV INTERNAL DIMENSION: 2.7 CM
ECHO RV TAPSE: 2 CM (ref 1.7–?)
ECHO TV E WAVE: 0.5 M/S
ECHO TV REGURGITANT MAX VELOCITY: 2.26 M/S
ECHO TV REGURGITANT PEAK GRADIENT: 20 MMHG
ERYTHROCYTE [DISTWIDTH] IN BLOOD BY AUTOMATED COUNT: 17.7 % (ref 11.5–14.5)
FERRITIN SERPL IA-MCNC: 1052 NG/ML (ref 10–291)
FOLATE SERPL-MCNC: > 20 NG/ML (ref 4.8–24.2)
GFR SERPL CREATININE-BSD FRML MDRD: > 60 ML/MIN/1.73M2
GLUCOSE SERPL-MCNC: 142 MG/DL (ref 70–108)
HCT VFR BLD AUTO: 35.1 % (ref 37–47)
HGB BLD-MCNC: 11.1 GM/DL (ref 12–16)
IRON SATN MFR SERPL: 6 % (ref 20–50)
IRON SERPL-MCNC: 13 UG/DL (ref 50–170)
MAGNESIUM SERPL-MCNC: 1.8 MG/DL (ref 1.6–2.4)
MCH RBC QN AUTO: 27.5 PG (ref 26–33)
MCHC RBC AUTO-ENTMCNC: 31.6 GM/DL (ref 32.2–35.5)
MCV RBC AUTO: 87.1 FL (ref 81–99)
NT-PROBNP SERPL IA-MCNC: 799.8 PG/ML (ref 0–124)
OSMOLALITY SERPL: 287 MOSMOL/KG (ref 275–295)
PLATELET # BLD AUTO: 355 THOU/MM3 (ref 130–400)
PMV BLD AUTO: 9.4 FL (ref 9.4–12.4)
POTASSIUM SERPL-SCNC: 4.4 MEQ/L (ref 3.5–5.2)
POTASSIUM UR-SCNC: 27.9 MEQ/L
PROCALCITONIN SERPL IA-MCNC: 0.11 NG/ML (ref 0.01–0.09)
RBC # BLD AUTO: 4.03 MILL/MM3 (ref 4.2–5.4)
SODIUM SERPL-SCNC: 133 MEQ/L (ref 135–145)
SODIUM UR-SCNC: 25 MEQ/L
T4 FREE SERPL-MCNC: 1.13 NG/DL (ref 0.93–1.76)
TIBC SERPL-MCNC: 224 UG/DL (ref 171–450)
TSH SERPL DL<=0.005 MIU/L-ACNC: 5.88 UIU/ML (ref 0.4–4.2)
UUN 24H UR-MCNC: 310 MG/DL
VIT B12 SERPL-MCNC: 303 PG/ML (ref 211–911)
WBC # BLD AUTO: 9.5 THOU/MM3 (ref 4.8–10.8)

## 2024-01-10 PROCEDURE — 1200000000 HC SEMI PRIVATE

## 2024-01-10 PROCEDURE — 6360000002 HC RX W HCPCS

## 2024-01-10 PROCEDURE — 93306 TTE W/DOPPLER COMPLETE: CPT | Performed by: NUCLEAR MEDICINE

## 2024-01-10 PROCEDURE — 93306 TTE W/DOPPLER COMPLETE: CPT

## 2024-01-10 PROCEDURE — 93010 ELECTROCARDIOGRAM REPORT: CPT | Performed by: INTERNAL MEDICINE

## 2024-01-10 PROCEDURE — 94640 AIRWAY INHALATION TREATMENT: CPT

## 2024-01-10 PROCEDURE — 36415 COLL VENOUS BLD VENIPUNCTURE: CPT

## 2024-01-10 PROCEDURE — 83930 ASSAY OF BLOOD OSMOLALITY: CPT

## 2024-01-10 PROCEDURE — 2580000003 HC RX 258: Performed by: STUDENT IN AN ORGANIZED HEALTH CARE EDUCATION/TRAINING PROGRAM

## 2024-01-10 PROCEDURE — 6370000000 HC RX 637 (ALT 250 FOR IP)

## 2024-01-10 PROCEDURE — 83735 ASSAY OF MAGNESIUM: CPT

## 2024-01-10 PROCEDURE — 2580000003 HC RX 258

## 2024-01-10 PROCEDURE — 6360000002 HC RX W HCPCS: Performed by: STUDENT IN AN ORGANIZED HEALTH CARE EDUCATION/TRAINING PROGRAM

## 2024-01-10 PROCEDURE — 82140 ASSAY OF AMMONIA: CPT

## 2024-01-10 PROCEDURE — 84145 PROCALCITONIN (PCT): CPT

## 2024-01-10 PROCEDURE — 99223 1ST HOSP IP/OBS HIGH 75: CPT | Performed by: STUDENT IN AN ORGANIZED HEALTH CARE EDUCATION/TRAINING PROGRAM

## 2024-01-10 PROCEDURE — 80048 BASIC METABOLIC PNL TOTAL CA: CPT

## 2024-01-10 PROCEDURE — 85027 COMPLETE CBC AUTOMATED: CPT

## 2024-01-10 RX ORDER — SODIUM CHLORIDE 0.9 % (FLUSH) 0.9 %
5-40 SYRINGE (ML) INJECTION PRN
Status: DISCONTINUED | OUTPATIENT
Start: 2024-01-10 | End: 2024-01-18 | Stop reason: HOSPADM

## 2024-01-10 RX ORDER — ESCITALOPRAM OXALATE 20 MG/1
20 TABLET ORAL DAILY
Status: DISCONTINUED | OUTPATIENT
Start: 2024-01-10 | End: 2024-01-18 | Stop reason: HOSPADM

## 2024-01-10 RX ORDER — ACETAMINOPHEN 650 MG/1
650 SUPPOSITORY RECTAL EVERY 6 HOURS PRN
Status: DISCONTINUED | OUTPATIENT
Start: 2024-01-10 | End: 2024-01-18 | Stop reason: HOSPADM

## 2024-01-10 RX ORDER — ONDANSETRON 4 MG/1
4 TABLET, ORALLY DISINTEGRATING ORAL EVERY 8 HOURS PRN
Status: DISCONTINUED | OUTPATIENT
Start: 2024-01-10 | End: 2024-01-18 | Stop reason: HOSPADM

## 2024-01-10 RX ORDER — ENOXAPARIN SODIUM 100 MG/ML
40 INJECTION SUBCUTANEOUS DAILY
Status: DISCONTINUED | OUTPATIENT
Start: 2024-01-10 | End: 2024-01-18 | Stop reason: HOSPADM

## 2024-01-10 RX ORDER — FENOFIBRATE 54 MG/1
54 TABLET ORAL DAILY
Status: DISCONTINUED | OUTPATIENT
Start: 2024-01-10 | End: 2024-01-18 | Stop reason: HOSPADM

## 2024-01-10 RX ORDER — FLUTICASONE PROPIONATE 50 MCG
1 SPRAY, SUSPENSION (ML) NASAL 2 TIMES DAILY
Status: DISCONTINUED | OUTPATIENT
Start: 2024-01-10 | End: 2024-01-18 | Stop reason: HOSPADM

## 2024-01-10 RX ORDER — POLYETHYLENE GLYCOL 3350 17 G/17G
17 POWDER, FOR SOLUTION ORAL DAILY PRN
Status: DISCONTINUED | OUTPATIENT
Start: 2024-01-10 | End: 2024-01-18 | Stop reason: HOSPADM

## 2024-01-10 RX ORDER — ATORVASTATIN CALCIUM 40 MG/1
40 TABLET, FILM COATED ORAL NIGHTLY
Status: DISCONTINUED | OUTPATIENT
Start: 2024-01-10 | End: 2024-01-18 | Stop reason: HOSPADM

## 2024-01-10 RX ORDER — ALBUTEROL SULFATE 90 UG/1
2 AEROSOL, METERED RESPIRATORY (INHALATION) EVERY 4 HOURS PRN
Status: DISCONTINUED | OUTPATIENT
Start: 2024-01-10 | End: 2024-01-18 | Stop reason: HOSPADM

## 2024-01-10 RX ORDER — ASPIRIN 81 MG/1
81 TABLET ORAL DAILY
Status: DISCONTINUED | OUTPATIENT
Start: 2024-01-10 | End: 2024-01-18 | Stop reason: HOSPADM

## 2024-01-10 RX ORDER — LEVOTHYROXINE SODIUM 0.15 MG/1
150 TABLET ORAL WEEKLY
Status: DISCONTINUED | OUTPATIENT
Start: 2024-01-10 | End: 2024-01-18 | Stop reason: HOSPADM

## 2024-01-10 RX ORDER — TRAZODONE HYDROCHLORIDE 100 MG/1
200 TABLET ORAL NIGHTLY
Status: DISCONTINUED | OUTPATIENT
Start: 2024-01-10 | End: 2024-01-18 | Stop reason: HOSPADM

## 2024-01-10 RX ORDER — PANTOPRAZOLE SODIUM 40 MG/1
40 TABLET, DELAYED RELEASE ORAL
Status: DISCONTINUED | OUTPATIENT
Start: 2024-01-10 | End: 2024-01-18 | Stop reason: HOSPADM

## 2024-01-10 RX ORDER — BUMETANIDE 0.25 MG/ML
0.5 INJECTION INTRAMUSCULAR; INTRAVENOUS ONCE
Status: DISCONTINUED | OUTPATIENT
Start: 2024-01-10 | End: 2024-01-11

## 2024-01-10 RX ORDER — ACETAMINOPHEN 325 MG/1
650 TABLET ORAL EVERY 6 HOURS PRN
Status: DISCONTINUED | OUTPATIENT
Start: 2024-01-10 | End: 2024-01-18 | Stop reason: HOSPADM

## 2024-01-10 RX ORDER — SODIUM CHLORIDE 9 MG/ML
INJECTION, SOLUTION INTRAVENOUS PRN
Status: DISCONTINUED | OUTPATIENT
Start: 2024-01-10 | End: 2024-01-18 | Stop reason: HOSPADM

## 2024-01-10 RX ORDER — SODIUM CHLORIDE 0.9 % (FLUSH) 0.9 %
5-40 SYRINGE (ML) INJECTION EVERY 12 HOURS SCHEDULED
Status: DISCONTINUED | OUTPATIENT
Start: 2024-01-10 | End: 2024-01-18 | Stop reason: HOSPADM

## 2024-01-10 RX ORDER — BUPROPION HYDROCHLORIDE 150 MG/1
300 TABLET ORAL EVERY MORNING
Status: DISCONTINUED | OUTPATIENT
Start: 2024-01-10 | End: 2024-01-18 | Stop reason: HOSPADM

## 2024-01-10 RX ORDER — NICOTINE 21 MG/24HR
1 PATCH, TRANSDERMAL 24 HOURS TRANSDERMAL DAILY
Status: DISCONTINUED | OUTPATIENT
Start: 2024-01-10 | End: 2024-01-18 | Stop reason: HOSPADM

## 2024-01-10 RX ORDER — CARVEDILOL 3.12 MG/1
3.12 TABLET ORAL 2 TIMES DAILY WITH MEALS
Status: DISCONTINUED | OUTPATIENT
Start: 2024-01-10 | End: 2024-01-18 | Stop reason: HOSPADM

## 2024-01-10 RX ORDER — LEVOTHYROXINE SODIUM 0.07 MG/1
75 TABLET ORAL
Status: DISCONTINUED | OUTPATIENT
Start: 2024-01-10 | End: 2024-01-18 | Stop reason: HOSPADM

## 2024-01-10 RX ORDER — ALBUTEROL SULFATE 90 UG/1
2 AEROSOL, METERED RESPIRATORY (INHALATION) EVERY 6 HOURS PRN
Status: DISCONTINUED | OUTPATIENT
Start: 2024-01-10 | End: 2024-01-10

## 2024-01-10 RX ORDER — ONDANSETRON 2 MG/ML
4 INJECTION INTRAMUSCULAR; INTRAVENOUS EVERY 6 HOURS PRN
Status: DISCONTINUED | OUTPATIENT
Start: 2024-01-10 | End: 2024-01-18 | Stop reason: HOSPADM

## 2024-01-10 RX ORDER — ALBUTEROL SULFATE 90 UG/1
2 AEROSOL, METERED RESPIRATORY (INHALATION)
Status: DISCONTINUED | OUTPATIENT
Start: 2024-01-10 | End: 2024-01-14

## 2024-01-10 RX ORDER — FOLIC ACID 1 MG/1
1 TABLET ORAL DAILY
Status: DISCONTINUED | OUTPATIENT
Start: 2024-01-10 | End: 2024-01-18 | Stop reason: HOSPADM

## 2024-01-10 RX ADMIN — SODIUM CHLORIDE, PRESERVATIVE FREE 10 ML: 5 INJECTION INTRAVENOUS at 10:12

## 2024-01-10 RX ADMIN — ALBUTEROL SULFATE 2 PUFF: 90 AEROSOL, METERED RESPIRATORY (INHALATION) at 16:52

## 2024-01-10 RX ADMIN — PIPERACILLIN AND TAZOBACTAM 3375 MG: 3; .375 INJECTION, POWDER, FOR SOLUTION INTRAVENOUS at 17:30

## 2024-01-10 RX ADMIN — QUETIAPINE FUMARATE 600 MG: 400 TABLET ORAL at 20:20

## 2024-01-10 RX ADMIN — TRAZODONE HYDROCHLORIDE 200 MG: 100 TABLET ORAL at 20:20

## 2024-01-10 RX ADMIN — ATORVASTATIN CALCIUM 40 MG: 40 TABLET, FILM COATED ORAL at 20:20

## 2024-01-10 RX ADMIN — ALBUTEROL SULFATE 2 PUFF: 90 AEROSOL, METERED RESPIRATORY (INHALATION) at 10:26

## 2024-01-10 RX ADMIN — BUPROPION HYDROCHLORIDE 300 MG: 150 TABLET, EXTENDED RELEASE ORAL at 10:19

## 2024-01-10 RX ADMIN — PANTOPRAZOLE SODIUM 40 MG: 40 TABLET, DELAYED RELEASE ORAL at 10:20

## 2024-01-10 RX ADMIN — ACETAMINOPHEN 650 MG: 325 TABLET ORAL at 20:20

## 2024-01-10 RX ADMIN — CARVEDILOL 3.12 MG: 3.12 TABLET, FILM COATED ORAL at 10:19

## 2024-01-10 RX ADMIN — ENOXAPARIN SODIUM 40 MG: 100 INJECTION SUBCUTANEOUS at 10:20

## 2024-01-10 RX ADMIN — LEVOTHYROXINE SODIUM 150 MCG: 0.15 TABLET ORAL at 10:19

## 2024-01-10 RX ADMIN — CARVEDILOL 3.12 MG: 3.12 TABLET, FILM COATED ORAL at 16:34

## 2024-01-10 RX ADMIN — SODIUM CHLORIDE: 9 INJECTION, SOLUTION INTRAVENOUS at 10:34

## 2024-01-10 RX ADMIN — PIPERACILLIN AND TAZOBACTAM 3375 MG: 3; .375 INJECTION, POWDER, FOR SOLUTION INTRAVENOUS at 10:35

## 2024-01-10 RX ADMIN — ACETAMINOPHEN 650 MG: 325 TABLET ORAL at 10:21

## 2024-01-10 RX ADMIN — FOLIC ACID 1 MG: 1 TABLET ORAL at 10:19

## 2024-01-10 RX ADMIN — LEVOTHYROXINE SODIUM 75 MCG: 0.07 TABLET ORAL at 10:18

## 2024-01-10 RX ADMIN — ESCITALOPRAM OXALATE 20 MG: 20 TABLET, FILM COATED ORAL at 10:19

## 2024-01-10 RX ADMIN — PANTOPRAZOLE SODIUM 40 MG: 40 TABLET, DELAYED RELEASE ORAL at 16:35

## 2024-01-10 RX ADMIN — FLUTICASONE PROPIONATE 1 SPRAY: 50 SPRAY, METERED NASAL at 10:15

## 2024-01-10 RX ADMIN — FENOFIBRATE 54 MG: 54 TABLET ORAL at 10:19

## 2024-01-10 RX ADMIN — PIPERACILLIN AND TAZOBACTAM 4500 MG: 4; .5 INJECTION, POWDER, FOR SOLUTION INTRAVENOUS at 01:26

## 2024-01-10 RX ADMIN — FLUTICASONE PROPIONATE 1 SPRAY: 50 SPRAY, METERED NASAL at 20:20

## 2024-01-10 RX ADMIN — ONDANSETRON 4 MG: 4 TABLET, ORALLY DISINTEGRATING ORAL at 20:20

## 2024-01-10 ASSESSMENT — PAIN DESCRIPTION - ORIENTATION
ORIENTATION: RIGHT;LEFT;LOWER
ORIENTATION: LOWER
ORIENTATION: RIGHT;LEFT;LOWER

## 2024-01-10 ASSESSMENT — PAIN SCALES - GENERAL
PAINLEVEL_OUTOF10: 3
PAINLEVEL_OUTOF10: 8
PAINLEVEL_OUTOF10: 9
PAINLEVEL_OUTOF10: 8
PAINLEVEL_OUTOF10: 8

## 2024-01-10 ASSESSMENT — PAIN DESCRIPTION - LOCATION
LOCATION: ABDOMEN

## 2024-01-10 ASSESSMENT — PAIN DESCRIPTION - PAIN TYPE
TYPE: ACUTE PAIN

## 2024-01-10 ASSESSMENT — PAIN DESCRIPTION - DESCRIPTORS
DESCRIPTORS: PATIENT UNABLE TO DESCRIBE

## 2024-01-10 ASSESSMENT — PAIN - FUNCTIONAL ASSESSMENT
PAIN_FUNCTIONAL_ASSESSMENT: ACTIVITIES ARE NOT PREVENTED

## 2024-01-10 ASSESSMENT — PAIN DESCRIPTION - ONSET: ONSET: ON-GOING

## 2024-01-10 ASSESSMENT — PAIN DESCRIPTION - FREQUENCY: FREQUENCY: INTERMITTENT

## 2024-01-10 NOTE — CARE COORDINATION
1/10/24, 10:22 AM EST  Discharge Planning Evaluation  Social work consult received, patient from [facility].   Left a voicemail with son Johnny to verify discharge plans. Patient reports she had been at [facility] and also [facility]. Pt confused at times, orientated x1 when SW visiting    Would patient be willing to go to a skilled facility if needed: was recently at [facility].  Is there skilled care available at current facility: no.  Barriers to return to current living situation: if patient needing IV or has drains, AL cannot care for this, AL reports they were concerned with pt's confusion when she returned to them yesterday from [facility], reports pt normally orientated x4   Spoke with Libia at the facility.  Patient bed hold: yes  Anticipated transport plan: ambulette  Patient's Healthcare Decision Maker: Named in Scanned ACP Document    SW met with Mercedes this morning, reports she was at [facility] and [facility], SW unable to verify what facility she was recently from. Pt orientated x1, during visit. Pt does talk about her sister Bailey who lives in [place]. SW did review POA paperwork son Johnny listed as primary POA, pt reports he lives in [place]. Pt needing to use the bathroom, tech did assist pt with this. Pt reports she does not use DME.     SW did talk with Libia with [facility], reports pt had just returned to them last night from [facility] and was confused. She confirms pt does not use DME at facility.   Readmission Risk Low 0-14, Mod 15-19), High > 20: Readmission Risk Score: 25.6    Current PCP: Miguel Romo MD  PCP verified by CM? Yes    Patient Orientation: Person    Patient Cognition: Alert  History Provided by: Patient, Medical Record, Other (see comment) ([facility])    Advance Directives:      Code Status: Full Code   Patient's Primary Decision Maker is: Named in Scanned ACP Document    Secondary Decision Maker (Active):

## 2024-01-10 NOTE — CARE COORDINATION
1/10/24, 7:23 AM EST      DISCHARGE PLANNING EVALUATION    Elli Coulter  Admitted: 1/9/2024  Hospital Day: 1    Location: -05/005-A Reason for admit: Perinephric abscess [N15.1]  Acute encephalopathy [G93.40]  AMS (altered mental status) [R41.82]    Past Medical History:   Diagnosis Date    Allergic rhinitis     Arthritis     back, arms, hips    Bipolar 1 disorder (HCC)     Cancer (HCC) 2011    cancerous polyps removed - Dr. Silva    Cataract     Colon polyps     COPD (chronic obstructive pulmonary disease) (HCC) 07/24/2014    Coronary vasospasm (HCC) 08/17/2019    Depression     Diverticulitis of colon     GERD (gastroesophageal reflux disease)     Glaucoma     Hearing loss     Hiatal hernia     History of arterial ischemic stroke 07/20/2019    History of colonoscopy 2002    History of kidney stones     Hx of blood clots 06/17/2014    PE and collar bone area after shoulder surgery    Hyperlipidemia     Hypertension     Liver disease     enlarged liver - damaged with alcohol in past but no cirrhosis per patient    Pneumonia 07/24/2014    Seasonal allergies     sneezing    Sexual problems     Suicidal thoughts     2015 admitted to 4E from [facility]    Thyroid disease     Urinary incontinence     Vomiting     Wears dentures     Wears glasses        Procedure: N/A  1/09 CT of head:  IMPRESSION:  No acute intracranial abnormality.  Atrophy and chronic microvascular ischemia.    1/09 CT of abdomen:  IMPRESSION:  4.3 x 2.4 cm posterior subcapsular collection of the left kidney. This   probably communicates with a 5.8 x 2.7 cm left retrorenal fluid   collection. The findings are grossly unchanged and compatible with the   history of abscess.  Nonobstructive left renal stones measuring up to 8 x 4 mm.  Multiple left para-aortic lymph nodes measuring less than 1 cm in short   axis, likely reactive.  Sigmoid diverticulum. There is no acute diverticulitis.  Small hiatal hernia.  Borderline cardiomegaly.    Barriers to

## 2024-01-10 NOTE — PLAN OF CARE
Problem: Respiratory - Adult  Goal: Clear lung sounds  1/10/2024 1654 by Rosalee Kim, APRIL  Outcome: Progressing  Note: Mdi to maintain open airways. Patient mutually agreed on goals.

## 2024-01-10 NOTE — PLAN OF CARE
Problem: Discharge Planning  Goal: Discharge to home or other facility with appropriate resources  Outcome: Progressing   SW consult received. See SW note 1/10/24.

## 2024-01-10 NOTE — ED PROVIDER NOTES
[facility] EMERGENCY DEPT  EMERGENCY DEPARTMENT ENCOUNTER          Pt Name: Elli Coulter  MRN: [id number]  Birthdate 1955  Date of evaluation: 1/9/2024  Physician: Santos Medrano DO      CHIEF COMPLAINT       Chief Complaint   Patient presents with    Altered Mental Status         HISTORY OF PRESENT ILLNESS    HPI  Elli Coulter is a 68 y.o. female who presents to the emergency department from nursing home, brought in by EMS for evaluation of acute cephalopathy.  Facility states that the patient has not been acting herself.  They state that she is confused.  Patient denies any acute complaint upon my initial evaluation.  However, she is unable to tell me where she is at, the year, or the month.  The patient has no other acute complaints at this time.      REVIEW OF SYSTEMS   Review of Systems   Unable to perform ROS: Mental status change         PAST MEDICAL AND SURGICAL HISTORY     Past Medical History:   Diagnosis Date    Allergic rhinitis     Arthritis     back, arms, hips    Bipolar 1 disorder (HCC)     Cancer (HCC) 2011    cancerous polyps removed - Dr. Silva    Cataract     Colon polyps     COPD (chronic obstructive pulmonary disease) (HCC) 07/24/2014    Coronary vasospasm (HCC) 08/17/2019    Depression     Diverticulitis of colon     GERD (gastroesophageal reflux disease)     Glaucoma     Hearing loss     Hiatal hernia     History of arterial ischemic stroke 07/20/2019    History of colonoscopy 2002    History of kidney stones     Hx of blood clots 06/17/2014    PE and collar bone area after shoulder surgery    Hyperlipidemia     Hypertension     Liver disease     enlarged liver - damaged with alcohol in past but no cirrhosis per patient    Pneumonia 07/24/2014    Seasonal allergies     sneezing    Sexual problems     Suicidal thoughts     2015 admitted to 4E from [facility]    Thyroid disease     Urinary incontinence     Vomiting     Wears dentures     Wears glasses      Past

## 2024-01-10 NOTE — ED NOTES
Pt resting on cot with eyes closed, appears in no acute distress. Vital signs stable. Call light in reach.

## 2024-01-10 NOTE — PLAN OF CARE
Patient admitted after midnight for metabolic encephalopathy thought to be secondary to UTI vs L sided perinephric abscess. She has been AOx3 since admission per H&P. Discussed with Urology, will monitor for now given no change in size on imaging. May consider ID consult.     Electronically signed by Dayanara Garcia PA-C on 1/10/2024 at 2:57 PM

## 2024-01-10 NOTE — CONSULTS
[facility]  [facility]  [address] [facility] [address]  Dept: [phone]  Loc: [phone]  Visit Date: 1/9/2024    Urology Consult Note    Reason for Consult:  perinephric abscess reoccurence   Requesting Physician:  medicine    History Obtained From:  patient, electronic medical record    Chief Complaint: AMS, abd pain    HISTORY OF PRESENT ILLNESS:                The patient is a 68 y.o. female with significant past medical history of Hx of left-sided perinephric s/p drainage, hypothyroidism, anemia, COPD, HTN, HLD who presented to [facility] on 4/9/2024 for evaluation of AMS from nursing home.  Patient was recently hospitalized from 11/30 to 12/5/2023; she had similar complaints of left-sided abdominal tenderness and was found to have left-sided perinephric abscess which was drained patient was sent home on antibiotic course.  At nursing home patient had AMS and abdominal tenderness; and patient was brought in for further evaluation      OV 1/4/24   1. OAB (overactive bladder)  2. Stress incontinence in female  3. Urinary frequency  - Hx of botox, mild stress incontinence  - Referral for PFPT in past  - LUTS not bothersome at this time.      4. Perinephric hematoma  - Reviewed CT, hematoma decreasing size.   - Pigtail drain removed today in office without difficulty.   - Follow-up 6 months with CT a/p wo contrast     Case discussed with Dr. Lorenzo.  Past Medical History:        Diagnosis Date    Allergic rhinitis     Arthritis     back, arms, hips    Bipolar 1 disorder (HCC)     Cancer (HCC) 2011    cancerous polyps removed - Dr. Silva    Cataract     Colon polyps     COPD (chronic obstructive pulmonary disease) (HCC) 07/24/2014    Coronary vasospasm (HCC) 08/17/2019    Depression     Diverticulitis of colon     GERD (gastroesophageal reflux disease)     Glaucoma     Hearing loss     Hiatal hernia     History of arterial ischemic stroke 07/20/2019    History of

## 2024-01-10 NOTE — PLAN OF CARE
Problem: Respiratory - Adult  Goal: Clear lung sounds  Outcome: Progressing  Note: Mdi to help improve lung aeration. Patient mutually agreed on goals.

## 2024-01-10 NOTE — ED TRIAGE NOTES
Pt arrives via EMS for AMS from [facility] in [place]. Pt alert on arrival. POC glucose 116 per EMS. Pt oriented to self and place. Pt unsure why she is here and believes her birth year is in the 1970's which is incorrect. Pt playing with SpO2 sensor during triage, states she is trying to \"find the year\".

## 2024-01-10 NOTE — PLAN OF CARE
Problem: Pain  Goal: Verbalizes/displays adequate comfort level or baseline comfort level  Outcome: Progressing  Flowsheets (Taken 1/10/2024 1021)  Verbalizes/displays adequate comfort level or baseline comfort level:   Encourage patient to monitor pain and request assistance   Assess pain using appropriate pain scale   Administer analgesics based on type and severity of pain and evaluate response   Implement non-pharmacological measures as appropriate and evaluate response   Consider cultural and social influences on pain and pain management   Notify Licensed Independent Practitioner if interventions unsuccessful or patient reports new pain     Problem: Safety - Adult  Goal: Free from fall injury  Outcome: Progressing  Flowsheets (Taken 1/10/2024 1852)  Free From Fall Injury: Instruct family/caregiver on patient safety     Problem: ABCDS Injury Assessment  Goal: Absence of physical injury  Outcome: Progressing  Flowsheets (Taken 1/10/2024 1852)  Absence of Physical Injury: Implement safety measures based on patient assessment     Problem: Skin/Tissue Integrity  Goal: Absence of new skin breakdown  Description: 1.  Monitor for areas of redness and/or skin breakdown  2.  Assess vascular access sites hourly  3.  Every 4-6 hours minimum:  Change oxygen saturation probe site  4.  Every 4-6 hours:  If on nasal continuous positive airway pressure, respiratory therapy assess nares and determine need for appliance change or resting period.  Outcome: Progressing     Problem: Chronic Conditions and Co-morbidities  Goal: Patient's chronic conditions and co-morbidity symptoms are monitored and maintained or improved  Outcome: Progressing  Flowsheets (Taken 1/10/2024 1019)  Care Plan - Patient's Chronic Conditions and Co-Morbidity Symptoms are Monitored and Maintained or Improved:   Monitor and assess patient's chronic conditions and comorbid symptoms for stability, deterioration, or improvement   Collaborate with

## 2024-01-11 ENCOUNTER — APPOINTMENT (OUTPATIENT)
Dept: CT IMAGING | Age: 69
DRG: 689 | End: 2024-01-11
Payer: MEDICARE

## 2024-01-11 LAB
ANION GAP SERPL CALC-SCNC: 9 MEQ/L (ref 8–16)
BUN SERPL-MCNC: 10 MG/DL (ref 7–22)
CALCIUM SERPL-MCNC: 10.3 MG/DL (ref 8.5–10.5)
CHLORIDE SERPL-SCNC: 98 MEQ/L (ref 98–111)
CO2 SERPL-SCNC: 25 MEQ/L (ref 23–33)
CREAT SERPL-MCNC: 0.7 MG/DL (ref 0.4–1.2)
DEPRECATED RDW RBC AUTO: 56.5 FL (ref 35–45)
ERYTHROCYTE [DISTWIDTH] IN BLOOD BY AUTOMATED COUNT: 17.3 % (ref 11.5–14.5)
GFR SERPL CREATININE-BSD FRML MDRD: > 60 ML/MIN/1.73M2
GLUCOSE SERPL-MCNC: 145 MG/DL (ref 70–108)
HCT VFR BLD AUTO: 34.6 % (ref 37–47)
HGB BLD-MCNC: 10.8 GM/DL (ref 12–16)
MAGNESIUM SERPL-MCNC: 1.6 MG/DL (ref 1.6–2.4)
MCH RBC QN AUTO: 27.5 PG (ref 26–33)
MCHC RBC AUTO-ENTMCNC: 31.2 GM/DL (ref 32.2–35.5)
MCV RBC AUTO: 88 FL (ref 81–99)
PLATELET # BLD AUTO: 349 THOU/MM3 (ref 130–400)
PMV BLD AUTO: 8.8 FL (ref 9.4–12.4)
POTASSIUM SERPL-SCNC: 4.1 MEQ/L (ref 3.5–5.2)
RBC # BLD AUTO: 3.93 MILL/MM3 (ref 4.2–5.4)
SODIUM SERPL-SCNC: 132 MEQ/L (ref 135–145)
WBC # BLD AUTO: 8.8 THOU/MM3 (ref 4.8–10.8)

## 2024-01-11 PROCEDURE — 77012 CT SCAN FOR NEEDLE BIOPSY: CPT

## 2024-01-11 PROCEDURE — 6370000000 HC RX 637 (ALT 250 FOR IP): Performed by: PHYSICIAN ASSISTANT

## 2024-01-11 PROCEDURE — 1200000000 HC SEMI PRIVATE

## 2024-01-11 PROCEDURE — 94640 AIRWAY INHALATION TREATMENT: CPT

## 2024-01-11 PROCEDURE — 2580000003 HC RX 258

## 2024-01-11 PROCEDURE — 85027 COMPLETE CBC AUTOMATED: CPT

## 2024-01-11 PROCEDURE — 87070 CULTURE OTHR SPECIMN AEROBIC: CPT

## 2024-01-11 PROCEDURE — 6370000000 HC RX 637 (ALT 250 FOR IP)

## 2024-01-11 PROCEDURE — 87186 SC STD MICRODIL/AGAR DIL: CPT

## 2024-01-11 PROCEDURE — 99231 SBSQ HOSP IP/OBS SF/LOW 25: CPT | Performed by: UROLOGY

## 2024-01-11 PROCEDURE — 87075 CULTR BACTERIA EXCEPT BLOOD: CPT

## 2024-01-11 PROCEDURE — 6360000002 HC RX W HCPCS

## 2024-01-11 PROCEDURE — 87205 SMEAR GRAM STAIN: CPT

## 2024-01-11 PROCEDURE — 80048 BASIC METABOLIC PNL TOTAL CA: CPT

## 2024-01-11 PROCEDURE — 87077 CULTURE AEROBIC IDENTIFY: CPT

## 2024-01-11 PROCEDURE — 0T9130Z DRAINAGE OF LEFT KIDNEY WITH DRAINAGE DEVICE, PERCUTANEOUS APPROACH: ICD-10-PCS | Performed by: RADIOLOGY

## 2024-01-11 PROCEDURE — 83735 ASSAY OF MAGNESIUM: CPT

## 2024-01-11 PROCEDURE — 94760 N-INVAS EAR/PLS OXIMETRY 1: CPT

## 2024-01-11 PROCEDURE — 6360000002 HC RX W HCPCS: Performed by: RADIOLOGY

## 2024-01-11 PROCEDURE — C1769 GUIDE WIRE: HCPCS

## 2024-01-11 PROCEDURE — 36415 COLL VENOUS BLD VENIPUNCTURE: CPT

## 2024-01-11 RX ORDER — TRAMADOL HYDROCHLORIDE 50 MG/1
50 TABLET ORAL EVERY 6 HOURS PRN
Status: DISCONTINUED | OUTPATIENT
Start: 2024-01-11 | End: 2024-01-18 | Stop reason: HOSPADM

## 2024-01-11 RX ORDER — FENTANYL CITRATE 50 UG/ML
50 INJECTION, SOLUTION INTRAMUSCULAR; INTRAVENOUS ONCE
Status: COMPLETED | OUTPATIENT
Start: 2024-01-11 | End: 2024-01-11

## 2024-01-11 RX ORDER — MIDAZOLAM HYDROCHLORIDE 1 MG/ML
1 INJECTION INTRAMUSCULAR; INTRAVENOUS ONCE
Status: COMPLETED | OUTPATIENT
Start: 2024-01-11 | End: 2024-01-11

## 2024-01-11 RX ADMIN — FENTANYL CITRATE 50 MCG: 50 INJECTION INTRAMUSCULAR; INTRAVENOUS at 11:00

## 2024-01-11 RX ADMIN — FENOFIBRATE 54 MG: 54 TABLET ORAL at 08:44

## 2024-01-11 RX ADMIN — MIDAZOLAM 1 MG: 1 INJECTION INTRAMUSCULAR; INTRAVENOUS at 11:00

## 2024-01-11 RX ADMIN — TRAZODONE HYDROCHLORIDE 200 MG: 100 TABLET ORAL at 22:01

## 2024-01-11 RX ADMIN — PANTOPRAZOLE SODIUM 40 MG: 40 TABLET, DELAYED RELEASE ORAL at 06:10

## 2024-01-11 RX ADMIN — ESCITALOPRAM OXALATE 20 MG: 20 TABLET, FILM COATED ORAL at 08:41

## 2024-01-11 RX ADMIN — TRAMADOL HYDROCHLORIDE 50 MG: 50 TABLET, COATED ORAL at 22:01

## 2024-01-11 RX ADMIN — CARVEDILOL 3.12 MG: 3.12 TABLET, FILM COATED ORAL at 16:51

## 2024-01-11 RX ADMIN — ACETAMINOPHEN 650 MG: 325 TABLET ORAL at 03:47

## 2024-01-11 RX ADMIN — ATORVASTATIN CALCIUM 40 MG: 40 TABLET, FILM COATED ORAL at 22:02

## 2024-01-11 RX ADMIN — ALBUTEROL SULFATE 2 PUFF: 90 AEROSOL, METERED RESPIRATORY (INHALATION) at 21:26

## 2024-01-11 RX ADMIN — SODIUM CHLORIDE, PRESERVATIVE FREE 10 ML: 5 INJECTION INTRAVENOUS at 08:43

## 2024-01-11 RX ADMIN — FLUTICASONE PROPIONATE 1 SPRAY: 50 SPRAY, METERED NASAL at 08:41

## 2024-01-11 RX ADMIN — LEVOTHYROXINE SODIUM 75 MCG: 0.07 TABLET ORAL at 06:10

## 2024-01-11 RX ADMIN — SODIUM CHLORIDE, PRESERVATIVE FREE 10 ML: 5 INJECTION INTRAVENOUS at 22:01

## 2024-01-11 RX ADMIN — FOLIC ACID 1 MG: 1 TABLET ORAL at 08:41

## 2024-01-11 RX ADMIN — FENTANYL CITRATE 50 MCG: 50 INJECTION INTRAMUSCULAR; INTRAVENOUS at 11:08

## 2024-01-11 RX ADMIN — PIPERACILLIN AND TAZOBACTAM 3375 MG: 3; .375 INJECTION, POWDER, FOR SOLUTION INTRAVENOUS at 02:08

## 2024-01-11 RX ADMIN — FLUTICASONE PROPIONATE 1 SPRAY: 50 SPRAY, METERED NASAL at 22:02

## 2024-01-11 RX ADMIN — PIPERACILLIN AND TAZOBACTAM 3375 MG: 3; .375 INJECTION, POWDER, FOR SOLUTION INTRAVENOUS at 22:03

## 2024-01-11 RX ADMIN — MIDAZOLAM 1 MG: 1 INJECTION INTRAMUSCULAR; INTRAVENOUS at 11:08

## 2024-01-11 RX ADMIN — PANTOPRAZOLE SODIUM 40 MG: 40 TABLET, DELAYED RELEASE ORAL at 16:48

## 2024-01-11 RX ADMIN — CARVEDILOL 3.12 MG: 3.12 TABLET, FILM COATED ORAL at 08:41

## 2024-01-11 RX ADMIN — PIPERACILLIN AND TAZOBACTAM 3375 MG: 3; .375 INJECTION, POWDER, FOR SOLUTION INTRAVENOUS at 13:07

## 2024-01-11 RX ADMIN — QUETIAPINE FUMARATE 600 MG: 400 TABLET ORAL at 22:02

## 2024-01-11 RX ADMIN — TRAMADOL HYDROCHLORIDE 50 MG: 50 TABLET, COATED ORAL at 14:39

## 2024-01-11 RX ADMIN — BUPROPION HYDROCHLORIDE 300 MG: 150 TABLET, EXTENDED RELEASE ORAL at 08:40

## 2024-01-11 ASSESSMENT — PAIN DESCRIPTION - FREQUENCY
FREQUENCY: INTERMITTENT
FREQUENCY: INTERMITTENT

## 2024-01-11 ASSESSMENT — PAIN DESCRIPTION - DESCRIPTORS
DESCRIPTORS: ACHING
DESCRIPTORS: ACHING;DISCOMFORT

## 2024-01-11 ASSESSMENT — PAIN DESCRIPTION - ORIENTATION
ORIENTATION: LEFT
ORIENTATION: LEFT

## 2024-01-11 ASSESSMENT — PAIN DESCRIPTION - ONSET
ONSET: ON-GOING
ONSET: ON-GOING

## 2024-01-11 ASSESSMENT — PAIN DESCRIPTION - PAIN TYPE
TYPE: ACUTE PAIN;SURGICAL PAIN
TYPE: SURGICAL PAIN

## 2024-01-11 ASSESSMENT — PAIN DESCRIPTION - LOCATION
LOCATION: ABDOMEN
LOCATION: ABDOMEN

## 2024-01-11 ASSESSMENT — PAIN SCALES - GENERAL
PAINLEVEL_OUTOF10: 10
PAINLEVEL_OUTOF10: 9

## 2024-01-11 NOTE — PLAN OF CARE
Problem: Pain  Goal: Verbalizes/displays adequate comfort level or baseline comfort level  1/11/2024 0832 by Victoriano Riojas RN  Outcome: Progressing     Problem: Safety - Adult  Goal: Free from fall injury  1/11/2024 0832 by Victoriano Riojas RN  Outcome: Progressing     Problem: ABCDS Injury Assessment  Goal: Absence of physical injury  1/11/2024 0832 by Victoriano Riojas RN  Outcome: Progressing     Problem: Skin/Tissue Integrity  Goal: Absence of new skin breakdown  Description: 1.  Monitor for areas of redness and/or skin breakdown  2.  Assess vascular access sites hourly  3.  Every 4-6 hours minimum:  Change oxygen saturation probe site  4.  Every 4-6 hours:  If on nasal continuous positive airway pressure, respiratory therapy assess nares and determine need for appliance change or resting period.  1/11/2024 0832 by Victoriano Riojas RN  Outcome: Progressing     Problem: Chronic Conditions and Co-morbidities  Goal: Patient's chronic conditions and co-morbidity symptoms are monitored and maintained or improved  1/11/2024 0832 by Victoriano Riojas, RN  Outcome: Progressing     Problem: Respiratory - Adult  Goal: Clear lung sounds  1/11/2024 0832 by Victoriano Riojas RN  Outcome: Progressing     Problem: Discharge Planning  Goal: Discharge to home or other facility with appropriate resources  1/11/2024 0832 by Victoriano Riojas RN  Outcome: Progressing

## 2024-01-11 NOTE — PLAN OF CARE
Problem: Pain  Goal: Verbalizes/displays adequate comfort level or baseline comfort level  1/10/2024 2351 by Carol Ann Day RN  Outcome: Progressing  Flowsheets (Taken 1/10/2024 2014)  Verbalizes/displays adequate comfort level or baseline comfort level:   Encourage patient to monitor pain and request assistance   Assess pain using appropriate pain scale   Administer analgesics based on type and severity of pain and evaluate response   Implement non-pharmacological measures as appropriate and evaluate response   Consider cultural and social influences on pain and pain management     Problem: Safety - Adult  Goal: Free from fall injury  1/10/2024 2351 by Carol Ann Day, RN  Outcome: Progressing  Flowsheets (Taken 1/10/2024 2351)  Free From Fall Injury: Instruct family/caregiver on patient safety     Problem: ABCDS Injury Assessment  Goal: Absence of physical injury  1/10/2024 2351 by Carol Ann Day, RN  Outcome: Progressing  Flowsheets (Taken 1/10/2024 2351)  Absence of Physical Injury: Implement safety measures based on patient assessment     Problem: Skin/Tissue Integrity  Goal: Absence of new skin breakdown  Description: 1.  Monitor for areas of redness and/or skin breakdown  2.  Assess vascular access sites hourly  3.  Every 4-6 hours minimum:  Change oxygen saturation probe site  4.  Every 4-6 hours:  If on nasal continuous positive airway pressure, respiratory therapy assess nares and determine need for appliance change or resting period.  1/10/2024 2351 by Carol Ann Day, RN  Outcome: Progressing  Note: No new skin breakdown noted on assessment.       Problem: Chronic Conditions and Co-morbidities  Goal: Patient's chronic conditions and co-morbidity symptoms are monitored and maintained or improved  1/10/2024 2351 by Carol Ann Day, RN  Outcome: Progressing  Flowsheets (Taken 1/10/2024 2016)  Care Plan - Patient's Chronic Conditions and Co-Morbidity Symptoms are Monitored and Maintained or Improved:

## 2024-01-11 NOTE — H&P
[facility]  Sedation/Analgesia History & Physical    Pt Name: Elli Coulter  MRN: [id number]  YOB: 1955  Provider Performing Procedure: Anshul Pablo MD, MD  Primary Care Physician: Miguel Romo MD    Formulation and discussion of sedation / procedure plans, risks, benefits, side effects and alternatives with patient and/or responsible adult completed.    PRE-PROCEDURE   DNR-CCA/DNR-CC []Yes [x]No  Brief History/Pre-Procedure Diagnosis: Left perinephric abscess          MEDICAL HISTORY  []CAD/Valve  []Liver Disease  []Lung Disease []Diabetes  []Hypertension []Renal Disease  [x]Additional information:       has a past medical history of Allergic rhinitis, Arthritis, Bipolar 1 disorder (HCC), Cancer (HCC), Cataract, Colon polyps, COPD (chronic obstructive pulmonary disease) (HCC), Coronary vasospasm (HCC), Depression, Diverticulitis of colon, GERD (gastroesophageal reflux disease), Glaucoma, Hearing loss, Hiatal hernia, History of arterial ischemic stroke, History of colonoscopy, History of kidney stones, Hx of blood clots, Hyperlipidemia, Hypertension, Liver disease, Pneumonia, Seasonal allergies, Sexual problems, Suicidal thoughts, Thyroid disease, Urinary incontinence, Vomiting, Wears dentures, and Wears glasses.    SURGICAL HISTORY   has a past surgical history that includes Mandible fracture surgery (1984); shoulder surgery (2014); Colonoscopy (2011); Dilatation, esophagus; Elbow surgery (Right, 10/7/2004); Rotator cuff repair (2004, 2014); skin biopsy (2006); Cholecystectomy, laparoscopic (6/12/15); Elbow surgery (Bilateral, 2016); Carpal tunnel release (Bilateral, 2016); Hysterectomy (1998); cystoscopy w biopsy of bladder (N/A, 7/13/2020); Stimulator Surgery (N/A, 11/4/2020); Stimulator Surgery (N/A, 11/23/2020); Abdomen surgery; Cystoscopy (N/A, 2/10/2022); Cardiac surgery; Stimulator Surgery (N/A, 4/14/2022); EGD; Cystoscopy (N/A, 12/27/2022); Ureter surgery (Left, 
(KLOR-CON M) 20 MEQ extended release tablet Take 1 tablet by mouth daily 12/5/22   Lizbeth Payan MD   QUEtiapine (SEROQUEL) 300 MG tablet Take 2 tablets by mouth nightly 2 tab    Lizbeth Payan MD   carvedilol (COREG) 3.125 MG tablet TAKE 1 TABLET BY MOUTH TWICE DAILY 7/1/20   Lizbeth Payan MD   aspirin 81 MG EC tablet Take 1 tablet by mouth daily 2/22/21   Jenny Sherwood MD   albuterol (PROVENTIL) (2.5 MG/3ML) 0.083% nebulizer solution Take 3 mLs by nebulization every 6 hours as needed for Wheezing 8/26/20   Jennifer Ortega APRN - CNP   acetaminophen (TYLENOL) 325 MG tablet Take by mouth every 6 hours as needed for Pain 2 tab    Lizbeth Payan MD   levothyroxine (SYNTHROID) 75 MCG tablet Take 1 tablet by mouth daily everyday except Sunday take 150 mcg. 12/26/19   Balwinder Patel APRN - CNP   fluticasone (FLONASE) 50 MCG/ACT nasal spray 1 spray by Each Nostril route 2 times daily 12/26/19   Balwinder Patel APRN - CNP   ferrous sulfate 325 (65 Fe) MG tablet Take 1 tablet by mouth 2 times daily 12/26/19   Balwinder Patel APRN - CNP   folic acid (FOLVITE) 1 MG tablet Take 1 tablet by mouth daily    Lizbeth Payan MD   atorvastatin (LIPITOR) 40 MG tablet Take 1 tablet by mouth nightly    Lizbeth Payan MD   escitalopram (LEXAPRO) 20 MG tablet Take 1 tablet by mouth daily    Lizbeth Payan MD   fenofibrate 160 MG tablet Take 145 mg by mouth daily    Lizbeth Payan MD   traZODone (DESYREL) 100 MG tablet Take 2 tablets by mouth nightly 10/31/18   Krysta Paula MD       Allergies:  Patient has no known allergies.    Past Medical, Social, Family Hx: see bottom of note or chart    Physical Exam:  /70   Pulse 65   Temp 98.5 °F (36.9 °C) (Oral)   Resp 18   Ht 1.626 m (5' 4\")   Wt 74.4 kg (164 lb)   SpO2 92%   BMI 28.15 kg/m²     General appearance: Chronically ill-appearing.  Eyes:  PERRL  HENT: Head normal in

## 2024-01-11 NOTE — CONSULTS
CONSULTATION NOTE :ID       Patient - Elli Coulter,  Age - 68 y.o.    - 1955      Room Number - 5K-05/005-A   MRN -  [id number]   [facility] # - [id number]  Date of Admission -  2024  9:04 PM  Patient's PCP: Miguel Romo MD     Requesting Physician: Dayanara Garcia PA-C    REASON FOR CONSULTATION   Left perinephric collection  CHIEF COMPLAINT   Change in mental state    HISTORY OF PRESENT ILLNESS       This is a very pleasant 68 y.o. female who was admitted to the hospital with a chief complaints of change in mental state  She is known to me from past hospitalization. Treated for perinephric fluid collection /abscess with prolonged antibiotics. She was admitted for abdominal pain and change in mental state. She was found collection around the left perinephric area. A drain was placed. Looks bloody fluid.she is on iv antibiotic.     PAST MEDICAL  HISTORY       Past Medical History:   Diagnosis Date    Allergic rhinitis     Arthritis     back, arms, hips    Bipolar 1 disorder (HCC)     Cancer (HCC)     cancerous polyps removed - Dr. Silva    Cataract     Colon polyps     COPD (chronic obstructive pulmonary disease) (HCC) 2014    Coronary vasospasm (HCC) 2019    Depression     Diverticulitis of colon     GERD (gastroesophageal reflux disease)     Glaucoma     Hearing loss     Hiatal hernia     History of arterial ischemic stroke 2019    History of colonoscopy 2002    History of kidney stones     Hx of blood clots 2014    PE and collar bone area after shoulder surgery    Hyperlipidemia     Hypertension     Liver disease     enlarged liver - damaged with alcohol in past but no cirrhosis per patient    Pneumonia 2014    Seasonal allergies     sneezing    Sexual problems     Suicidal thoughts      admitted to  from [facility]    Thyroid disease     Urinary incontinence     Vomiting     Wears dentures     Wears glasses        PAST SURGICAL

## 2024-01-11 NOTE — CARE COORDINATION
1/11/24, 10:52 AM EST    DISCHARGE PLANNING EVALUATION    SW stopped by pt's room, pt off unit. SW did have a message from son/EVELYN Turner last night. SW did call Johnny this morning, seemed to be unaware he is POA, paperwork on file from 2019 unless pt has changed documents since then. Johnny preference for pt to return to [facility] if able. ROBERTO did discussed SNF if needed pending clinical course.

## 2024-01-11 NOTE — CARE COORDINATION
1/11/24, 1:32 PM EST    DISCHARGE ON GOING EVALUATION    Elli Coulter       [facility] day: 1  Location: -05/005-A Reason for admit: Perinephric abscess [N15.1]  Acute encephalopathy [G93.40]  AMS (altered mental status) [R41.82]   Procedure:   1/9 CT Head WO: No acute intracranial abnormality. Atrophy and chronic microvascular ischemia.  1/10 CT A/P W: 4.3 x 2.4 cm posterior subcapsular collection of the left kidney. This probably communicates with a 5.8 x 2.7 cm left retrorenal fluid collection. The findings are grossly unchanged and compatible with the history of abscess. Nonobstructive left renal stones measuring up to 8 x 4 mm. Multiple left para-aortic lymph nodes measuring less than 1 cm in short axis, likely reactive. Sigmoid diverticulum. There is no acute diverticulitis. Small hiatal hernia.Borderline cardiomegaly  1/10 CXR: Mild pulmonary vascular congestion. Cardiomegaly  1/10 Echo: EF of 60 - 65%.   1/11 CT guided drain placement: Left Perirenal    Barriers to Discharge: Hospitalist, Urology and ID following. Left perirenal drain placed today. Room air. Tmax 100.4 over HS. Tylenol prn. IV zosyn q8h. WBC 8.8. A&A culture pending.     PCP: Miguel Romo MD  Readmission Risk Score: 25.7%  Patient Goals/Plan/Treatment Preferences: Mercedes is from [place]. May need SNF pending medical course. SW following.

## 2024-01-11 NOTE — OP NOTE
Department of Radiology  Post Procedure Progress Note      Pre-Procedure Diagnosis:  Left perinephric abscess    Procedure Performed:  CT guided drain placement    Anesthesia: local / versed and fentanyl    Findings: successful    Immediate Complications:  None    Estimated Blood Loss: minimal    SEE DICTATED PROCEDURE NOTE FOR COMPLETE DETAILS.    Electronically signed by Anshul Pablo MD on 1/11/2024 at 11:12 AM

## 2024-01-12 LAB
ANION GAP SERPL CALC-SCNC: 11 MEQ/L (ref 8–16)
BUN SERPL-MCNC: 10 MG/DL (ref 7–22)
CALCIUM SERPL-MCNC: 10.3 MG/DL (ref 8.5–10.5)
CHLORIDE SERPL-SCNC: 102 MEQ/L (ref 98–111)
CO2 SERPL-SCNC: 24 MEQ/L (ref 23–33)
CREAT SERPL-MCNC: 0.7 MG/DL (ref 0.4–1.2)
DEPRECATED RDW RBC AUTO: 55.8 FL (ref 35–45)
EKG ATRIAL RATE: 73 BPM
EKG P AXIS: 47 DEGREES
EKG P-R INTERVAL: 148 MS
EKG Q-T INTERVAL: 390 MS
EKG QRS DURATION: 84 MS
EKG QTC CALCULATION (BAZETT): 429 MS
EKG R AXIS: -6 DEGREES
EKG T AXIS: 57 DEGREES
EKG VENTRICULAR RATE: 73 BPM
ERYTHROCYTE [DISTWIDTH] IN BLOOD BY AUTOMATED COUNT: 17.3 % (ref 11.5–14.5)
GFR SERPL CREATININE-BSD FRML MDRD: > 60 ML/MIN/1.73M2
GLUCOSE SERPL-MCNC: 175 MG/DL (ref 70–108)
HCT VFR BLD AUTO: 33.4 % (ref 37–47)
HGB BLD-MCNC: 10.5 GM/DL (ref 12–16)
MAGNESIUM SERPL-MCNC: 1.6 MG/DL (ref 1.6–2.4)
MCH RBC QN AUTO: 27.5 PG (ref 26–33)
MCHC RBC AUTO-ENTMCNC: 31.4 GM/DL (ref 32.2–35.5)
MCV RBC AUTO: 87.4 FL (ref 81–99)
PLATELET # BLD AUTO: 382 THOU/MM3 (ref 130–400)
PMV BLD AUTO: 8.9 FL (ref 9.4–12.4)
POTASSIUM SERPL-SCNC: 4 MEQ/L (ref 3.5–5.2)
RBC # BLD AUTO: 3.82 MILL/MM3 (ref 4.2–5.4)
SODIUM SERPL-SCNC: 137 MEQ/L (ref 135–145)
WBC # BLD AUTO: 7.7 THOU/MM3 (ref 4.8–10.8)

## 2024-01-12 PROCEDURE — 6360000002 HC RX W HCPCS

## 2024-01-12 PROCEDURE — 85027 COMPLETE CBC AUTOMATED: CPT

## 2024-01-12 PROCEDURE — 6370000000 HC RX 637 (ALT 250 FOR IP): Performed by: PHYSICIAN ASSISTANT

## 2024-01-12 PROCEDURE — 6370000000 HC RX 637 (ALT 250 FOR IP)

## 2024-01-12 PROCEDURE — 80048 BASIC METABOLIC PNL TOTAL CA: CPT

## 2024-01-12 PROCEDURE — 83735 ASSAY OF MAGNESIUM: CPT

## 2024-01-12 PROCEDURE — 2580000003 HC RX 258

## 2024-01-12 PROCEDURE — 94640 AIRWAY INHALATION TREATMENT: CPT

## 2024-01-12 PROCEDURE — 36415 COLL VENOUS BLD VENIPUNCTURE: CPT

## 2024-01-12 PROCEDURE — 1200000000 HC SEMI PRIVATE

## 2024-01-12 PROCEDURE — 99231 SBSQ HOSP IP/OBS SF/LOW 25: CPT | Performed by: UROLOGY

## 2024-01-12 PROCEDURE — 6360000002 HC RX W HCPCS: Performed by: PHYSICIAN ASSISTANT

## 2024-01-12 RX ADMIN — ACETAMINOPHEN 650 MG: 325 TABLET ORAL at 04:27

## 2024-01-12 RX ADMIN — SODIUM CHLORIDE, PRESERVATIVE FREE 10 ML: 5 INJECTION INTRAVENOUS at 08:00

## 2024-01-12 RX ADMIN — ALBUTEROL SULFATE 2 PUFF: 90 AEROSOL, METERED RESPIRATORY (INHALATION) at 09:51

## 2024-01-12 RX ADMIN — QUETIAPINE FUMARATE 600 MG: 400 TABLET ORAL at 21:35

## 2024-01-12 RX ADMIN — PIPERACILLIN AND TAZOBACTAM 3375 MG: 3; .375 INJECTION, POWDER, FOR SOLUTION INTRAVENOUS at 15:02

## 2024-01-12 RX ADMIN — TRAMADOL HYDROCHLORIDE 50 MG: 50 TABLET, COATED ORAL at 09:04

## 2024-01-12 RX ADMIN — FLUTICASONE PROPIONATE 1 SPRAY: 50 SPRAY, METERED NASAL at 09:03

## 2024-01-12 RX ADMIN — LEVOTHYROXINE SODIUM 75 MCG: 0.07 TABLET ORAL at 04:28

## 2024-01-12 RX ADMIN — ACETAMINOPHEN 650 MG: 325 TABLET ORAL at 11:36

## 2024-01-12 RX ADMIN — BUPROPION HYDROCHLORIDE 300 MG: 150 TABLET, EXTENDED RELEASE ORAL at 09:00

## 2024-01-12 RX ADMIN — ALBUTEROL SULFATE 2 PUFF: 90 AEROSOL, METERED RESPIRATORY (INHALATION) at 18:01

## 2024-01-12 RX ADMIN — SODIUM CHLORIDE, PRESERVATIVE FREE 10 ML: 5 INJECTION INTRAVENOUS at 21:37

## 2024-01-12 RX ADMIN — ESCITALOPRAM OXALATE 20 MG: 20 TABLET, FILM COATED ORAL at 09:00

## 2024-01-12 RX ADMIN — PIPERACILLIN AND TAZOBACTAM 3375 MG: 3; .375 INJECTION, POWDER, FOR SOLUTION INTRAVENOUS at 04:30

## 2024-01-12 RX ADMIN — ENOXAPARIN SODIUM 40 MG: 100 INJECTION SUBCUTANEOUS at 09:05

## 2024-01-12 RX ADMIN — ATORVASTATIN CALCIUM 40 MG: 40 TABLET, FILM COATED ORAL at 21:36

## 2024-01-12 RX ADMIN — FOLIC ACID 1 MG: 1 TABLET ORAL at 09:00

## 2024-01-12 RX ADMIN — PANTOPRAZOLE SODIUM 40 MG: 40 TABLET, DELAYED RELEASE ORAL at 04:27

## 2024-01-12 RX ADMIN — TRAZODONE HYDROCHLORIDE 200 MG: 100 TABLET ORAL at 21:40

## 2024-01-12 RX ADMIN — CARVEDILOL 3.12 MG: 3.12 TABLET, FILM COATED ORAL at 17:47

## 2024-01-12 RX ADMIN — TRAMADOL HYDROCHLORIDE 50 MG: 50 TABLET, COATED ORAL at 16:32

## 2024-01-12 RX ADMIN — FENOFIBRATE 54 MG: 54 TABLET ORAL at 09:01

## 2024-01-12 RX ADMIN — HYDROMORPHONE HYDROCHLORIDE 0.5 MG: 1 INJECTION, SOLUTION INTRAMUSCULAR; INTRAVENOUS; SUBCUTANEOUS at 22:04

## 2024-01-12 RX ADMIN — ASPIRIN 81 MG: 81 TABLET, COATED ORAL at 09:04

## 2024-01-12 RX ADMIN — TRAMADOL HYDROCHLORIDE 50 MG: 50 TABLET, COATED ORAL at 04:27

## 2024-01-12 RX ADMIN — PIPERACILLIN AND TAZOBACTAM 3375 MG: 3; .375 INJECTION, POWDER, FOR SOLUTION INTRAVENOUS at 21:47

## 2024-01-12 RX ADMIN — PANTOPRAZOLE SODIUM 40 MG: 40 TABLET, DELAYED RELEASE ORAL at 15:03

## 2024-01-12 ASSESSMENT — PAIN - FUNCTIONAL ASSESSMENT
PAIN_FUNCTIONAL_ASSESSMENT: ACTIVITIES ARE NOT PREVENTED

## 2024-01-12 ASSESSMENT — PAIN SCALES - GENERAL
PAINLEVEL_OUTOF10: 9
PAINLEVEL_OUTOF10: 8
PAINLEVEL_OUTOF10: 10

## 2024-01-12 ASSESSMENT — PAIN DESCRIPTION - ORIENTATION
ORIENTATION: LEFT

## 2024-01-12 ASSESSMENT — PAIN DESCRIPTION - FREQUENCY
FREQUENCY: INTERMITTENT

## 2024-01-12 ASSESSMENT — PAIN DESCRIPTION - DESCRIPTORS
DESCRIPTORS: ACHING
DESCRIPTORS: ACHING
DESCRIPTORS: ACHING;BURNING
DESCRIPTORS: ACHING

## 2024-01-12 ASSESSMENT — PAIN DESCRIPTION - LOCATION
LOCATION: FLANK

## 2024-01-12 ASSESSMENT — PAIN DESCRIPTION - PAIN TYPE
TYPE: SURGICAL PAIN

## 2024-01-12 ASSESSMENT — PAIN DESCRIPTION - ONSET
ONSET: ON-GOING

## 2024-01-12 NOTE — CARE COORDINATION
1/12/24, 11:10 AM EST    DISCHARGE PLANNING EVALUATION    Pt had a drain placed yesterday, per notes pt to discharge with drain in place, waiting on culture results. SW did visit with pt this morning, orientated x3, she was up in her chair, states she is tired. SW discuss with pt having drain and possible need for IV antibiotics, will need to look at SNF. Pt unsure that she wants to return to [facility]. She states her niece works at an ECF but unsure which one, needing to contact her niece. Pt did call sister Bailey to get niece's number, then contact niece while SW at bedside. Pt reports niece is going to look at facilities today and will be by to visit with pt as well.     Post-acute (PAC) provider list was provided to patient. Patient was informed of their freedom to choose PAC provider. Discussed and offered to show the patient the relevant PAC Providers quality and resource use measures on Medicare Compare web site via computer based on patient's goals of care and treatment preferences. Questions regarding selection process were answered.  SW provided lists on in network agencies with pt's insurance as well.     ROBERTO did talk with Libia with [facility] discussed pt having drain, confirms that they cannot take [place] back [place] with her having a drain.     1:12 PM EST  Call from Radha with [employer], pt's niece works with them, asked if able to verify pt's insurance to see if pt able to go to them. ROBERTO met with pt and is okay with them checking into this.

## 2024-01-12 NOTE — PLAN OF CARE
Problem: Respiratory - Adult  Goal: Clear lung sounds  1/12/2024 0954 by Ruby Lind RCP  Outcome: Progressing

## 2024-01-12 NOTE — PLAN OF CARE
Problem: Respiratory - Adult  Goal: Clear lung sounds  1/11/2024 2128 by Chantale Dorman RCP  Outcome: Progressing   Patient mutually agrees upon goal.

## 2024-01-12 NOTE — PLAN OF CARE
Problem: Pain  Goal: Verbalizes/displays adequate comfort level or baseline comfort level  Outcome: Progressing  Flowsheets (Taken 1/12/2024 0019)  Verbalizes/displays adequate comfort level or baseline comfort level:   Encourage patient to monitor pain and request assistance   Assess pain using appropriate pain scale   Administer analgesics based on type and severity of pain and evaluate response   Implement non-pharmacological measures as appropriate and evaluate response     Problem: Safety - Adult  Goal: Free from fall injury  Outcome: Progressing  Flowsheets (Taken 1/12/2024 0019)  Free From Fall Injury: Instruct family/caregiver on patient safety     Problem: ABCDS Injury Assessment  Goal: Absence of physical injury  Outcome: Progressing  Flowsheets (Taken 1/12/2024 0019)  Absence of Physical Injury: Implement safety measures based on patient assessment     Problem: Skin/Tissue Integrity  Goal: Absence of new skin breakdown  Description: 1.  Monitor for areas of redness and/or skin breakdown  2.  Assess vascular access sites hourly  3.  Every 4-6 hours minimum:  Change oxygen saturation probe site  4.  Every 4-6 hours:  If on nasal continuous positive airway pressure, respiratory therapy assess nares and determine need for appliance change or resting period.  Outcome: Progressing     Problem: Chronic Conditions and Co-morbidities  Goal: Patient's chronic conditions and co-morbidity symptoms are monitored and maintained or improved  Outcome: Progressing  Flowsheets (Taken 1/12/2024 0019)  Care Plan - Patient's Chronic Conditions and Co-Morbidity Symptoms are Monitored and Maintained or Improved:   Collaborate with multidisciplinary team to address chronic and comorbid conditions and prevent exacerbation or deterioration   Monitor and assess patient's chronic conditions and comorbid symptoms for stability, deterioration, or improvement     Problem: Respiratory - Adult  Goal: Clear lung sounds  1/12/2024 0019

## 2024-01-12 NOTE — CARE COORDINATION
1/12/24, 2:37 PM EST    DISCHARGE ON GOING EVALUATION    Elli Coulter       Hospital day: 2  Location: -05/005-A Reason for admit: Perinephric abscess [N15.1]  Acute encephalopathy [G93.40]  AMS (altered mental status) [R41.82]   Procedure:   1/9 CT Head WO: No acute intracranial abnormality. Atrophy and chronic microvascular ischemia.  1/10 CT A/P W: 4.3 x 2.4 cm posterior subcapsular collection of the left kidney. This probably communicates with a 5.8 x 2.7 cm left retrorenal fluid collection. The findings are grossly unchanged and compatible with the history of abscess. Nonobstructive left renal stones measuring up to 8 x 4 mm. Multiple left para-aortic lymph nodes measuring less than 1 cm in short axis, likely reactive. Sigmoid diverticulum. There is no acute diverticulitis. Small hiatal hernia.Borderline cardiomegaly  1/10 CXR: Mild pulmonary vascular congestion. Cardiomegaly  1/10 Echo: EF of 60 - 65%.   1/11 CT guided drain placement: Left Perirenal    Barriers to Discharge: Hospitalist, Urology and ID following. Afebrile. Room air. HR 40s-50s today. Left perirenal drain care; flush q8h. Tylenol. IV zosyn q8h. Tramadol prn.     PCP: Miguel Romo MD  Readmission Risk Score: 26.3%  Patient Goals/Plan/Treatment Preferences: Mercedes is from [place]. Will need SNF, [place] unable to take back with drain. Will need precert. SW following.

## 2024-01-12 NOTE — PLAN OF CARE
Problem: Pain  Goal: Verbalizes/displays adequate comfort level or baseline comfort level  1/12/2024 1139 by Victoriano Riojas RN  Outcome: Progressing     Problem: Safety - Adult  Goal: Free from fall injury  1/12/2024 1139 by Victoriano Riojas RN  Outcome: Progressing     Problem: ABCDS Injury Assessment  Goal: Absence of physical injury  1/12/2024 1139 by Victoriano Riojas RN  Outcome: Progressing     Problem: Skin/Tissue Integrity  Goal: Absence of new skin breakdown  Description: 1.  Monitor for areas of redness and/or skin breakdown  2.  Assess vascular access sites hourly  3.  Every 4-6 hours minimum:  Change oxygen saturation probe site  4.  Every 4-6 hours:  If on nasal continuous positive airway pressure, respiratory therapy assess nares and determine need for appliance change or resting period.  1/12/2024 1139 by Victoriano Riojas RN  Outcome: Progressing     Problem: Chronic Conditions and Co-morbidities  Goal: Patient's chronic conditions and co-morbidity symptoms are monitored and maintained or improved  1/12/2024 1139 by Victoriano Riojas RN  Outcome: Progressing     Problem: Respiratory - Adult  Goal: Clear lung sounds  1/12/2024 1139 by Victoriano Riojas RN  Outcome: Progressing     Problem: Infection - Adult  Goal: Absence of infection during hospitalization  1/12/2024 1139 by Victoriano Riojas RN  Outcome: Progressing     Problem: Discharge Planning  Goal: Discharge to home or other facility with appropriate resources  1/12/2024 1139 by Victoriano Riojas RN  Outcome: Progressing     Problem: Nutrition Deficit:  Goal: Optimize nutritional status  1/12/2024 1139 by Victoriano Riojas, RN  Outcome: Progressing

## 2024-01-13 LAB
ANION GAP SERPL CALC-SCNC: 11 MEQ/L (ref 8–16)
BACTERIA UR CULT: ABNORMAL
BACTERIA UR CULT: ABNORMAL
BUN SERPL-MCNC: 10 MG/DL (ref 7–22)
CALCIUM SERPL-MCNC: 10.1 MG/DL (ref 8.5–10.5)
CHLORIDE SERPL-SCNC: 100 MEQ/L (ref 98–111)
CO2 SERPL-SCNC: 23 MEQ/L (ref 23–33)
CREAT SERPL-MCNC: 0.6 MG/DL (ref 0.4–1.2)
DEPRECATED RDW RBC AUTO: 54.6 FL (ref 35–45)
ERYTHROCYTE [DISTWIDTH] IN BLOOD BY AUTOMATED COUNT: 17.1 % (ref 11.5–14.5)
GFR SERPL CREATININE-BSD FRML MDRD: > 60 ML/MIN/1.73M2
GLUCOSE SERPL-MCNC: 147 MG/DL (ref 70–108)
HCT VFR BLD AUTO: 33.5 % (ref 37–47)
HGB BLD-MCNC: 10.4 GM/DL (ref 12–16)
MCH RBC QN AUTO: 27.4 PG (ref 26–33)
MCHC RBC AUTO-ENTMCNC: 31 GM/DL (ref 32.2–35.5)
MCV RBC AUTO: 88.2 FL (ref 81–99)
ORGANISM: ABNORMAL
PLATELET # BLD AUTO: 395 THOU/MM3 (ref 130–400)
PMV BLD AUTO: 8.7 FL (ref 9.4–12.4)
POTASSIUM SERPL-SCNC: 4.1 MEQ/L (ref 3.5–5.2)
RBC # BLD AUTO: 3.8 MILL/MM3 (ref 4.2–5.4)
SODIUM SERPL-SCNC: 134 MEQ/L (ref 135–145)
WBC # BLD AUTO: 6.5 THOU/MM3 (ref 4.8–10.8)

## 2024-01-13 PROCEDURE — 1200000000 HC SEMI PRIVATE

## 2024-01-13 PROCEDURE — 99231 SBSQ HOSP IP/OBS SF/LOW 25: CPT | Performed by: UROLOGY

## 2024-01-13 PROCEDURE — 6360000002 HC RX W HCPCS

## 2024-01-13 PROCEDURE — 85027 COMPLETE CBC AUTOMATED: CPT

## 2024-01-13 PROCEDURE — 6370000000 HC RX 637 (ALT 250 FOR IP): Performed by: PHYSICIAN ASSISTANT

## 2024-01-13 PROCEDURE — 2580000003 HC RX 258

## 2024-01-13 PROCEDURE — 6370000000 HC RX 637 (ALT 250 FOR IP)

## 2024-01-13 PROCEDURE — 94640 AIRWAY INHALATION TREATMENT: CPT

## 2024-01-13 PROCEDURE — 80048 BASIC METABOLIC PNL TOTAL CA: CPT

## 2024-01-13 PROCEDURE — 99232 SBSQ HOSP IP/OBS MODERATE 35: CPT | Performed by: PHYSICIAN ASSISTANT

## 2024-01-13 PROCEDURE — 36415 COLL VENOUS BLD VENIPUNCTURE: CPT

## 2024-01-13 RX ADMIN — BUPROPION HYDROCHLORIDE 300 MG: 150 TABLET, EXTENDED RELEASE ORAL at 11:40

## 2024-01-13 RX ADMIN — ATORVASTATIN CALCIUM 40 MG: 40 TABLET, FILM COATED ORAL at 20:30

## 2024-01-13 RX ADMIN — ENOXAPARIN SODIUM 40 MG: 100 INJECTION SUBCUTANEOUS at 11:44

## 2024-01-13 RX ADMIN — PIPERACILLIN AND TAZOBACTAM 3375 MG: 3; .375 INJECTION, POWDER, FOR SOLUTION INTRAVENOUS at 06:50

## 2024-01-13 RX ADMIN — ACETAMINOPHEN 650 MG: 325 TABLET ORAL at 18:58

## 2024-01-13 RX ADMIN — TRAMADOL HYDROCHLORIDE 50 MG: 50 TABLET, COATED ORAL at 02:27

## 2024-01-13 RX ADMIN — PANTOPRAZOLE SODIUM 40 MG: 40 TABLET, DELAYED RELEASE ORAL at 15:49

## 2024-01-13 RX ADMIN — FENOFIBRATE 54 MG: 54 TABLET ORAL at 11:40

## 2024-01-13 RX ADMIN — CARVEDILOL 3.12 MG: 3.12 TABLET, FILM COATED ORAL at 11:40

## 2024-01-13 RX ADMIN — FOLIC ACID 1 MG: 1 TABLET ORAL at 11:40

## 2024-01-13 RX ADMIN — ASPIRIN 81 MG: 81 TABLET, COATED ORAL at 11:44

## 2024-01-13 RX ADMIN — ACETAMINOPHEN 650 MG: 325 TABLET ORAL at 07:46

## 2024-01-13 RX ADMIN — PIPERACILLIN AND TAZOBACTAM 3375 MG: 3; .375 INJECTION, POWDER, FOR SOLUTION INTRAVENOUS at 23:48

## 2024-01-13 RX ADMIN — PANTOPRAZOLE SODIUM 40 MG: 40 TABLET, DELAYED RELEASE ORAL at 06:56

## 2024-01-13 RX ADMIN — ESCITALOPRAM OXALATE 20 MG: 20 TABLET, FILM COATED ORAL at 11:40

## 2024-01-13 RX ADMIN — FLUTICASONE PROPIONATE 1 SPRAY: 50 SPRAY, METERED NASAL at 11:39

## 2024-01-13 RX ADMIN — ALBUTEROL SULFATE 2 PUFF: 90 AEROSOL, METERED RESPIRATORY (INHALATION) at 07:46

## 2024-01-13 RX ADMIN — ALBUTEROL SULFATE 2 PUFF: 90 AEROSOL, METERED RESPIRATORY (INHALATION) at 15:44

## 2024-01-13 RX ADMIN — TRAMADOL HYDROCHLORIDE 50 MG: 50 TABLET, COATED ORAL at 15:52

## 2024-01-13 RX ADMIN — LEVOTHYROXINE SODIUM 75 MCG: 0.07 TABLET ORAL at 06:53

## 2024-01-13 RX ADMIN — PIPERACILLIN AND TAZOBACTAM 3375 MG: 3; .375 INJECTION, POWDER, FOR SOLUTION INTRAVENOUS at 15:48

## 2024-01-13 RX ADMIN — TRAZODONE HYDROCHLORIDE 200 MG: 100 TABLET ORAL at 20:30

## 2024-01-13 RX ADMIN — CARVEDILOL 3.12 MG: 3.12 TABLET, FILM COATED ORAL at 15:30

## 2024-01-13 RX ADMIN — QUETIAPINE FUMARATE 600 MG: 400 TABLET ORAL at 20:30

## 2024-01-13 ASSESSMENT — PAIN DESCRIPTION - DESCRIPTORS
DESCRIPTORS: SORE
DESCRIPTORS: ACHING;BURNING
DESCRIPTORS: ACHING
DESCRIPTORS: ACHING

## 2024-01-13 ASSESSMENT — PAIN SCALES - GENERAL
PAINLEVEL_OUTOF10: 4
PAINLEVEL_OUTOF10: 0
PAINLEVEL_OUTOF10: 7
PAINLEVEL_OUTOF10: 10
PAINLEVEL_OUTOF10: 8
PAINLEVEL_OUTOF10: 8
PAINLEVEL_OUTOF10: 0

## 2024-01-13 ASSESSMENT — PAIN DESCRIPTION - LOCATION
LOCATION: FLANK
LOCATION: RIB CAGE

## 2024-01-13 ASSESSMENT — PAIN DESCRIPTION - ORIENTATION
ORIENTATION: LEFT

## 2024-01-13 ASSESSMENT — PAIN - FUNCTIONAL ASSESSMENT
PAIN_FUNCTIONAL_ASSESSMENT: PREVENTS OR INTERFERES WITH MANY ACTIVE NOT PASSIVE ACTIVITIES
PAIN_FUNCTIONAL_ASSESSMENT: ACTIVITIES ARE NOT PREVENTED
PAIN_FUNCTIONAL_ASSESSMENT: ACTIVITIES ARE NOT PREVENTED
PAIN_FUNCTIONAL_ASSESSMENT: PREVENTS OR INTERFERES WITH MANY ACTIVE NOT PASSIVE ACTIVITIES

## 2024-01-13 ASSESSMENT — PAIN SCALES - WONG BAKER: WONGBAKER_NUMERICALRESPONSE: 0

## 2024-01-13 NOTE — PLAN OF CARE
Problem: Respiratory - Adult  Goal: Clear lung sounds  Outcome: Progressing   Pt continues on MDI for history of COPD and to clear lung sounds/improve aeration. Patient mutually agreed on goals.

## 2024-01-13 NOTE — PLAN OF CARE
Problem: Respiratory - Adult  Goal: Clear lung sounds  1/13/2024 1546 by Radha Ortega, RCP  Outcome: Progressing   Pt continues on MDI for history of COPD and to clear lung sounds/improve aeration. Patient mutually agreed on goals.

## 2024-01-13 NOTE — PLAN OF CARE
Problem: Pain  Goal: Verbalizes/displays adequate comfort level or baseline comfort level  Outcome: Progressing  Flowsheets (Taken 1/13/2024 1602)  Verbalizes/displays adequate comfort level or baseline comfort level:   Encourage patient to monitor pain and request assistance   Assess pain using appropriate pain scale   Administer analgesics based on type and severity of pain and evaluate response  Note: PRN tramadol.      Problem: Safety - Adult  Goal: Free from fall injury  Outcome: Progressing  Flowsheets (Taken 1/13/2024 1602)  Free From Fall Injury:   Instruct family/caregiver on patient safety   Based on caregiver fall risk screen, instruct family/caregiver to ask for assistance with transferring infant if caregiver noted to have fall risk factors     Problem: ABCDS Injury Assessment  Goal: Absence of physical injury  Outcome: Progressing  Flowsheets (Taken 1/13/2024 1602)  Absence of Physical Injury: Implement safety measures based on patient assessment     Problem: Skin/Tissue Integrity  Goal: Absence of new skin breakdown  Description: 1.  Monitor for areas of redness and/or skin breakdown  2.  Assess vascular access sites hourly  3.  Every 4-6 hours minimum:  Change oxygen saturation probe site  4.  Every 4-6 hours:  If on nasal continuous positive airway pressure, respiratory therapy assess nares and determine need for appliance change or resting period.  Outcome: Progressing  Note: Patient repositions every 2 hours. Heels elevated off of bed. Skin kept clean and dry. Skin assessed with every head to toe. Juan Pablo scale complete. CHG education. Dressing to R flank drain is clean, dry and intact.      Problem: Chronic Conditions and Co-morbidities  Goal: Patient's chronic conditions and co-morbidity symptoms are monitored and maintained or improved  Outcome: Progressing  Flowsheets (Taken 1/13/2024 1602)  Care Plan - Patient's Chronic Conditions and Co-Morbidity Symptoms are Monitored and Maintained or

## 2024-01-14 LAB
ANION GAP SERPL CALC-SCNC: 10 MEQ/L (ref 8–16)
BUN SERPL-MCNC: 12 MG/DL (ref 7–22)
CALCIUM SERPL-MCNC: 9.8 MG/DL (ref 8.5–10.5)
CHLORIDE SERPL-SCNC: 101 MEQ/L (ref 98–111)
CO2 SERPL-SCNC: 25 MEQ/L (ref 23–33)
CREAT SERPL-MCNC: 0.7 MG/DL (ref 0.4–1.2)
DEPRECATED RDW RBC AUTO: 55.2 FL (ref 35–45)
ERYTHROCYTE [DISTWIDTH] IN BLOOD BY AUTOMATED COUNT: 16.9 % (ref 11.5–14.5)
GFR SERPL CREATININE-BSD FRML MDRD: > 60 ML/MIN/1.73M2
GLUCOSE SERPL-MCNC: 192 MG/DL (ref 70–108)
HCT VFR BLD AUTO: 33.3 % (ref 37–47)
HGB BLD-MCNC: 10.1 GM/DL (ref 12–16)
MCH RBC QN AUTO: 27.4 PG (ref 26–33)
MCHC RBC AUTO-ENTMCNC: 30.3 GM/DL (ref 32.2–35.5)
MCV RBC AUTO: 90.2 FL (ref 81–99)
PLATELET # BLD AUTO: 388 THOU/MM3 (ref 130–400)
PMV BLD AUTO: 8.6 FL (ref 9.4–12.4)
POTASSIUM SERPL-SCNC: 4.1 MEQ/L (ref 3.5–5.2)
RBC # BLD AUTO: 3.69 MILL/MM3 (ref 4.2–5.4)
SODIUM SERPL-SCNC: 136 MEQ/L (ref 135–145)
WBC # BLD AUTO: 5.7 THOU/MM3 (ref 4.8–10.8)

## 2024-01-14 PROCEDURE — 85027 COMPLETE CBC AUTOMATED: CPT

## 2024-01-14 PROCEDURE — 36415 COLL VENOUS BLD VENIPUNCTURE: CPT

## 2024-01-14 PROCEDURE — 6370000000 HC RX 637 (ALT 250 FOR IP): Performed by: PHYSICIAN ASSISTANT

## 2024-01-14 PROCEDURE — 99232 SBSQ HOSP IP/OBS MODERATE 35: CPT | Performed by: PHYSICIAN ASSISTANT

## 2024-01-14 PROCEDURE — 94640 AIRWAY INHALATION TREATMENT: CPT

## 2024-01-14 PROCEDURE — 80048 BASIC METABOLIC PNL TOTAL CA: CPT

## 2024-01-14 PROCEDURE — 6370000000 HC RX 637 (ALT 250 FOR IP)

## 2024-01-14 PROCEDURE — 6360000002 HC RX W HCPCS

## 2024-01-14 PROCEDURE — 2580000003 HC RX 258

## 2024-01-14 PROCEDURE — 1200000000 HC SEMI PRIVATE

## 2024-01-14 RX ADMIN — ACETAMINOPHEN 650 MG: 325 TABLET ORAL at 11:09

## 2024-01-14 RX ADMIN — BUPROPION HYDROCHLORIDE 300 MG: 150 TABLET, EXTENDED RELEASE ORAL at 11:09

## 2024-01-14 RX ADMIN — PIPERACILLIN AND TAZOBACTAM 3375 MG: 3; .375 INJECTION, POWDER, FOR SOLUTION INTRAVENOUS at 16:36

## 2024-01-14 RX ADMIN — TRAMADOL HYDROCHLORIDE 50 MG: 50 TABLET, COATED ORAL at 13:25

## 2024-01-14 RX ADMIN — FOLIC ACID 1 MG: 1 TABLET ORAL at 11:09

## 2024-01-14 RX ADMIN — PANTOPRAZOLE SODIUM 40 MG: 40 TABLET, DELAYED RELEASE ORAL at 16:57

## 2024-01-14 RX ADMIN — ALBUTEROL SULFATE 2 PUFF: 90 AEROSOL, METERED RESPIRATORY (INHALATION) at 08:21

## 2024-01-14 RX ADMIN — FLUTICASONE PROPIONATE 1 SPRAY: 50 SPRAY, METERED NASAL at 11:09

## 2024-01-14 RX ADMIN — CARVEDILOL 3.12 MG: 3.12 TABLET, FILM COATED ORAL at 16:00

## 2024-01-14 RX ADMIN — ATORVASTATIN CALCIUM 40 MG: 40 TABLET, FILM COATED ORAL at 20:29

## 2024-01-14 RX ADMIN — ENOXAPARIN SODIUM 40 MG: 100 INJECTION SUBCUTANEOUS at 11:10

## 2024-01-14 RX ADMIN — ESCITALOPRAM OXALATE 20 MG: 20 TABLET, FILM COATED ORAL at 11:09

## 2024-01-14 RX ADMIN — ALBUTEROL SULFATE 2 PUFF: 90 AEROSOL, METERED RESPIRATORY (INHALATION) at 17:06

## 2024-01-14 RX ADMIN — FLUTICASONE PROPIONATE 1 SPRAY: 50 SPRAY, METERED NASAL at 20:30

## 2024-01-14 RX ADMIN — TRAMADOL HYDROCHLORIDE 50 MG: 50 TABLET, COATED ORAL at 20:31

## 2024-01-14 RX ADMIN — PIPERACILLIN AND TAZOBACTAM 3375 MG: 3; .375 INJECTION, POWDER, FOR SOLUTION INTRAVENOUS at 06:39

## 2024-01-14 RX ADMIN — ASPIRIN 81 MG: 81 TABLET, COATED ORAL at 11:09

## 2024-01-14 RX ADMIN — TRAMADOL HYDROCHLORIDE 50 MG: 50 TABLET, COATED ORAL at 06:47

## 2024-01-14 RX ADMIN — PANTOPRAZOLE SODIUM 40 MG: 40 TABLET, DELAYED RELEASE ORAL at 06:40

## 2024-01-14 RX ADMIN — QUETIAPINE FUMARATE 600 MG: 400 TABLET ORAL at 20:29

## 2024-01-14 RX ADMIN — CARVEDILOL 3.12 MG: 3.12 TABLET, FILM COATED ORAL at 11:09

## 2024-01-14 RX ADMIN — TRAZODONE HYDROCHLORIDE 200 MG: 100 TABLET ORAL at 20:29

## 2024-01-14 RX ADMIN — PIPERACILLIN AND TAZOBACTAM 3375 MG: 3; .375 INJECTION, POWDER, FOR SOLUTION INTRAVENOUS at 23:43

## 2024-01-14 RX ADMIN — FENOFIBRATE 54 MG: 54 TABLET ORAL at 11:09

## 2024-01-14 ASSESSMENT — PAIN DESCRIPTION - LOCATION
LOCATION: FLANK
LOCATION: ABDOMEN
LOCATION: FLANK

## 2024-01-14 ASSESSMENT — PAIN SCALES - GENERAL
PAINLEVEL_OUTOF10: 9
PAINLEVEL_OUTOF10: 9
PAINLEVEL_OUTOF10: 4
PAINLEVEL_OUTOF10: 0

## 2024-01-14 ASSESSMENT — PAIN DESCRIPTION - DESCRIPTORS
DESCRIPTORS: THROBBING
DESCRIPTORS: ACHING

## 2024-01-14 ASSESSMENT — PAIN - FUNCTIONAL ASSESSMENT
PAIN_FUNCTIONAL_ASSESSMENT: ACTIVITIES ARE NOT PREVENTED
PAIN_FUNCTIONAL_ASSESSMENT: PREVENTS OR INTERFERES WITH MANY ACTIVE NOT PASSIVE ACTIVITIES
PAIN_FUNCTIONAL_ASSESSMENT: ACTIVITIES ARE NOT PREVENTED
PAIN_FUNCTIONAL_ASSESSMENT: ACTIVITIES ARE NOT PREVENTED

## 2024-01-14 ASSESSMENT — PAIN DESCRIPTION - ORIENTATION
ORIENTATION: LEFT

## 2024-01-14 NOTE — RT PROTOCOL NOTE
RT Inhaler-Nebulizer Bronchodilator Protocol Note    There is a bronchodilator order in the chart from a provider indicating to follow the RT Bronchodilator Protocol and there is an “Initiate RT Inhaler-Nebulizer Bronchodilator Protocol” order as well (see protocol at bottom of note).    CXR Findings:  No results found.    The findings from the last RT Protocol Assessment were as follows:   History Pulmonary Disease: Chronic pulmonary disease  Respiratory Pattern: Regular pattern and RR 12-20 bpm  Breath Sounds: Clear breath sounds  Cough: Strong, spontaneous, non-productive  Indication for Bronchodilator Therapy: Decreased or absent breath sounds  Bronchodilator Assessment Score: 2    Aerosolized bronchodilator medication orders have been revised according to the RT Inhaler-Nebulizer Bronchodilator Protocol below.    Respiratory Therapist to perform RT Therapy Protocol Assessment initially then follow the protocol.  Repeat RT Therapy Protocol Assessment PRN for score 0-3 or on second treatment, BID, and PRN for scores above 3.    No Indications - adjust the frequency to every 6 hours PRN wheezing or bronchospasm, if no treatments needed after 48 hours then discontinue using Per Protocol order mode.     If indication present, adjust the RT bronchodilator orders based on the Bronchodilator Assessment Score as indicated below.  Use Inhaler orders unless patient has one or more of the following: on home nebulizer, not able to hold breath for 10 seconds, is not alert and oriented, cannot activate and use MDI correctly, or respiratory rate 25 breaths per minute or more, then use the equivalent nebulizer order(s) with same Frequency and PRN reasons based on the score.  If a patient is on this medication at home then do not decrease Frequency below that used at home.    0-3 - enter or revise RT bronchodilator order(s) to equivalent RT Bronchodilator order with Frequency of every 4 hours PRN for wheezing or increased work 
RT Inhaler-Nebulizer Bronchodilator Protocol Note    There is a bronchodilator order in the chart from a provider indicating to follow the RT Bronchodilator Protocol and there is an “Initiate RT Inhaler-Nebulizer Bronchodilator Protocol” order as well (see protocol at bottom of note).    CXR Findings:  No results found.    The findings from the last RT Protocol Assessment were as follows:   History Pulmonary Disease: Chronic pulmonary disease  Respiratory Pattern: Regular pattern and RR 12-20 bpm  Breath Sounds: Slightly diminished and/or crackles  Cough: Strong, spontaneous, non-productive  Indication for Bronchodilator Therapy: Decreased or absent breath sounds  Bronchodilator Assessment Score: 4    Aerosolized bronchodilator medication orders have been revised according to the RT Inhaler-Nebulizer Bronchodilator Protocol below.    Respiratory Therapist to perform RT Therapy Protocol Assessment initially then follow the protocol.  Repeat RT Therapy Protocol Assessment PRN for score 0-3 or on second treatment, BID, and PRN for scores above 3.    No Indications - adjust the frequency to every 6 hours PRN wheezing or bronchospasm, if no treatments needed after 48 hours then discontinue using Per Protocol order mode.     If indication present, adjust the RT bronchodilator orders based on the Bronchodilator Assessment Score as indicated below.  Use Inhaler orders unless patient has one or more of the following: on home nebulizer, not able to hold breath for 10 seconds, is not alert and oriented, cannot activate and use MDI correctly, or respiratory rate 25 breaths per minute or more, then use the equivalent nebulizer order(s) with same Frequency and PRN reasons based on the score.  If a patient is on this medication at home then do not decrease Frequency below that used at home.    0-3 - enter or revise RT bronchodilator order(s) to equivalent RT Bronchodilator order with Frequency of every 4 hours PRN for wheezing or 
RT Inhaler-Nebulizer Bronchodilator Protocol Note    There is a bronchodilator order in the chart from a provider indicating to follow the RT Bronchodilator Protocol and there is an “Initiate RT Inhaler-Nebulizer Bronchodilator Protocol” order as well (see protocol at bottom of note).    CXR Findings:  XR CHEST PORTABLE    Result Date: 1/10/2024  Mild pulmonary vascular congestion. Cardiomegaly. This document has been electronically signed by: Harry Hurst MD on 01/10/2024 12:54 AM      The findings from the last RT Protocol Assessment were as follows:   History Pulmonary Disease: Chronic pulmonary disease  Respiratory Pattern: Regular pattern and RR 12-20 bpm  Breath Sounds: Slightly diminished and/or crackles  Cough: Strong, spontaneous, non-productive  Indication for Bronchodilator Therapy: Decreased or absent breath sounds  Bronchodilator Assessment Score: 4    Aerosolized bronchodilator medication orders have been revised according to the RT Inhaler-Nebulizer Bronchodilator Protocol below.    Respiratory Therapist to perform RT Therapy Protocol Assessment initially then follow the protocol.  Repeat RT Therapy Protocol Assessment PRN for score 0-3 or on second treatment, BID, and PRN for scores above 3.    No Indications - adjust the frequency to every 6 hours PRN wheezing or bronchospasm, if no treatments needed after 48 hours then discontinue using Per Protocol order mode.     If indication present, adjust the RT bronchodilator orders based on the Bronchodilator Assessment Score as indicated below.  Use Inhaler orders unless patient has one or more of the following: on home nebulizer, not able to hold breath for 10 seconds, is not alert and oriented, cannot activate and use MDI correctly, or respiratory rate 25 breaths per minute or more, then use the equivalent nebulizer order(s) with same Frequency and PRN reasons based on the score.  If a patient is on this medication at home then do not decrease Frequency 
below that used at home.    0-3 - enter or revise RT bronchodilator order(s) to equivalent RT Bronchodilator order with Frequency of every 4 hours PRN for wheezing or increased work of breathing using Per Protocol order mode.        4-6 - enter or revise RT Bronchodilator order(s) to two equivalent RT bronchodilator orders with one order with BID Frequency and one order with Frequency of every 4 hours PRN wheezing or increased work of breathing using Per Protocol order mode.        7-10 - enter or revise RT Bronchodilator order(s) to two equivalent RT bronchodilator orders with one order with TID Frequency and one order with Frequency of every 4 hours PRN wheezing or increased work of breathing using Per Protocol order mode.       11-13 - enter or revise RT Bronchodilator order(s) to one equivalent RT bronchodilator order with QID Frequency and an Albuterol order with Frequency of every 4 hours PRN wheezing or increased work of breathing using Per Protocol order mode.      Greater than 13 - enter or revise RT Bronchodilator order(s) to one equivalent RT bronchodilator order with every 4 hours Frequency and an Albuterol order with Frequency of every 2 hours PRN wheezing or increased work of breathing using Per Protocol order mode.     RT to enter RT Home Evaluation for COPD & MDI Assessment order using Per Protocol order mode.    Electronically signed by Rosalee Kim RCP on 1/10/2024 at 10:25 AM

## 2024-01-14 NOTE — PLAN OF CARE
Problem: Pain  Goal: Verbalizes/displays adequate comfort level or baseline comfort level  Outcome: Progressing  Verbalizes/displays adequate comfort level or baseline comfort level:   Encourage patient to monitor pain and request assistance   Assess pain using appropriate pain scale   Administer analgesics based on type and severity of pain and evaluate response  Note: PRN tramadol.      Problem: Safety - Adult  Goal: Free from fall injury  Outcome: Progressing  Free From Fall Injury:   Instruct family/caregiver on patient safety   Based on caregiver fall risk screen, instruct family/caregiver to ask for assistance with transferring infant if caregiver noted to have fall risk factors     Problem: ABCDS Injury Assessment  Goal: Absence of physical injury  Outcome: Progressing  Absence of Physical Injury: Implement safety measures based on patient assessment     Problem: Skin/Tissue Integrity  Goal: Absence of new skin breakdown  Description: 1.  Monitor for areas of redness and/or skin breakdown  2.  Assess vascular access sites hourly  3.  Every 4-6 hours minimum:  Change oxygen saturation probe site  4.  Every 4-6 hours:  If on nasal continuous positive airway pressure, respiratory therapy assess nares and determine need for appliance change or resting period.  Outcome: Progressing  Note: Patient repositions every 2 hours. Heels elevated off of bed. Skin kept clean and dry. Skin assessed with every head to toe. Juan Pablo scale complete. CHG education. Dressing to R flank drain is clean, dry and intact.      Problem: Chronic Conditions and Co-morbidities  Goal: Patient's chronic conditions and co-morbidity symptoms are monitored and maintained or improved  Outcome: Progressing  Care Plan - Patient's Chronic Conditions and Co-Morbidity Symptoms are Monitored and Maintained or Improved:   Monitor and assess patient's chronic conditions and comorbid symptoms for stability, deterioration, or improvement   Collaborate with

## 2024-01-14 NOTE — PLAN OF CARE
Problem: Pain  Goal: Verbalizes/displays adequate comfort level or baseline comfort level  Outcome: Progressing  Verbalizes/displays adequate comfort level or baseline comfort level:   Encourage patient to monitor pain and request assistance   Assess pain using appropriate pain scale   Administer analgesics based on type and severity of pain and evaluate response  Note: PRN tramadol.      Problem: Safety - Adult  Goal: Free from fall injury  Outcome: Progressing  Free From Fall Injury:   Instruct family/caregiver on patient safety   Based on caregiver fall risk screen, instruct family/caregiver to ask for assistance with transferring infant if caregiver noted to have fall risk factors     Problem: ABCDS Injury Assessment  Goal: Absence of physical injury  Outcome: Progressing  Absence of Physical Injury: Implement safety measures based on patient assessment     Problem: Skin/Tissue Integrity  Goal: Absence of new skin breakdown  Description: 1.  Monitor for areas of redness and/or skin breakdown  2.  Assess vascular access sites hourly  3.  Every 4-6 hours minimum:  Change oxygen saturation probe site  4.  Every 4-6 hours:  If on nasal continuous positive airway pressure, respiratory therapy assess nares and determine need for appliance change or resting period.  Outcome: Progressing  Note: Patient repositions every 2 hours. Heels elevated off of bed. Skin kept clean and dry. Skin assessed with every head to toe. Juan Pablo scale complete. CHG bath. Dressing to R flank drain is clean, dry and intact.      Problem: Chronic Conditions and Co-morbidities  Goal: Patient's chronic conditions and co-morbidity symptoms are monitored and maintained or improved  Outcome: Progressing  Care Plan - Patient's Chronic Conditions and Co-Morbidity Symptoms are Monitored and Maintained or Improved:   Monitor and assess patient's chronic conditions and comorbid symptoms for stability, deterioration, or improvement   Collaborate with

## 2024-01-15 ENCOUNTER — TELEPHONE (OUTPATIENT)
Dept: UROLOGY | Age: 69
End: 2024-01-15

## 2024-01-15 LAB
ANION GAP SERPL CALC-SCNC: 10 MEQ/L (ref 8–16)
BACTERIA SPEC AEROBE CULT: ABNORMAL
BACTERIA SPEC ANAEROBE CULT: ABNORMAL
BUN SERPL-MCNC: 14 MG/DL (ref 7–22)
CALCIUM SERPL-MCNC: 10.2 MG/DL (ref 8.5–10.5)
CHLORIDE SERPL-SCNC: 102 MEQ/L (ref 98–111)
CO2 SERPL-SCNC: 26 MEQ/L (ref 23–33)
CREAT SERPL-MCNC: 0.6 MG/DL (ref 0.4–1.2)
DEPRECATED RDW RBC AUTO: 54.9 FL (ref 35–45)
ERYTHROCYTE [DISTWIDTH] IN BLOOD BY AUTOMATED COUNT: 17 % (ref 11.5–14.5)
GFR SERPL CREATININE-BSD FRML MDRD: > 60 ML/MIN/1.73M2
GLUCOSE SERPL-MCNC: 135 MG/DL (ref 70–108)
GRAM STN SPEC: ABNORMAL
HCT VFR BLD AUTO: 34.8 % (ref 37–47)
HGB BLD-MCNC: 10.7 GM/DL (ref 12–16)
MCH RBC QN AUTO: 27.6 PG (ref 26–33)
MCHC RBC AUTO-ENTMCNC: 30.7 GM/DL (ref 32.2–35.5)
MCV RBC AUTO: 89.7 FL (ref 81–99)
ORGANISM: ABNORMAL
PLATELET # BLD AUTO: 416 THOU/MM3 (ref 130–400)
PMV BLD AUTO: 8.1 FL (ref 9.4–12.4)
POTASSIUM SERPL-SCNC: 4.1 MEQ/L (ref 3.5–5.2)
RBC # BLD AUTO: 3.88 MILL/MM3 (ref 4.2–5.4)
SODIUM SERPL-SCNC: 138 MEQ/L (ref 135–145)
WBC # BLD AUTO: 7.4 THOU/MM3 (ref 4.8–10.8)

## 2024-01-15 PROCEDURE — 1200000000 HC SEMI PRIVATE

## 2024-01-15 PROCEDURE — 2580000003 HC RX 258

## 2024-01-15 PROCEDURE — 36415 COLL VENOUS BLD VENIPUNCTURE: CPT

## 2024-01-15 PROCEDURE — 80048 BASIC METABOLIC PNL TOTAL CA: CPT

## 2024-01-15 PROCEDURE — 6370000000 HC RX 637 (ALT 250 FOR IP): Performed by: PHYSICIAN ASSISTANT

## 2024-01-15 PROCEDURE — 6360000002 HC RX W HCPCS

## 2024-01-15 PROCEDURE — 99232 SBSQ HOSP IP/OBS MODERATE 35: CPT | Performed by: PHYSICIAN ASSISTANT

## 2024-01-15 PROCEDURE — 6370000000 HC RX 637 (ALT 250 FOR IP)

## 2024-01-15 PROCEDURE — 85027 COMPLETE CBC AUTOMATED: CPT

## 2024-01-15 RX ADMIN — QUETIAPINE FUMARATE 600 MG: 400 TABLET ORAL at 19:41

## 2024-01-15 RX ADMIN — FLUTICASONE PROPIONATE 1 SPRAY: 50 SPRAY, METERED NASAL at 19:41

## 2024-01-15 RX ADMIN — PANTOPRAZOLE SODIUM 40 MG: 40 TABLET, DELAYED RELEASE ORAL at 15:42

## 2024-01-15 RX ADMIN — CARVEDILOL 3.12 MG: 3.12 TABLET, FILM COATED ORAL at 15:47

## 2024-01-15 RX ADMIN — ATORVASTATIN CALCIUM 40 MG: 40 TABLET, FILM COATED ORAL at 19:41

## 2024-01-15 RX ADMIN — FOLIC ACID 1 MG: 1 TABLET ORAL at 08:41

## 2024-01-15 RX ADMIN — LEVOTHYROXINE SODIUM 75 MCG: 0.07 TABLET ORAL at 06:29

## 2024-01-15 RX ADMIN — ENOXAPARIN SODIUM 40 MG: 100 INJECTION SUBCUTANEOUS at 08:44

## 2024-01-15 RX ADMIN — PIPERACILLIN AND TAZOBACTAM 3375 MG: 3; .375 INJECTION, POWDER, FOR SOLUTION INTRAVENOUS at 22:25

## 2024-01-15 RX ADMIN — FENOFIBRATE 54 MG: 54 TABLET ORAL at 08:41

## 2024-01-15 RX ADMIN — CARVEDILOL 3.12 MG: 3.12 TABLET, FILM COATED ORAL at 08:43

## 2024-01-15 RX ADMIN — SODIUM CHLORIDE, PRESERVATIVE FREE 10 ML: 5 INJECTION INTRAVENOUS at 19:46

## 2024-01-15 RX ADMIN — TRAZODONE HYDROCHLORIDE 200 MG: 100 TABLET ORAL at 19:41

## 2024-01-15 RX ADMIN — BUPROPION HYDROCHLORIDE 300 MG: 150 TABLET, EXTENDED RELEASE ORAL at 08:42

## 2024-01-15 RX ADMIN — PANTOPRAZOLE SODIUM 40 MG: 40 TABLET, DELAYED RELEASE ORAL at 06:29

## 2024-01-15 RX ADMIN — ASPIRIN 81 MG: 81 TABLET, COATED ORAL at 08:40

## 2024-01-15 RX ADMIN — TRAMADOL HYDROCHLORIDE 50 MG: 50 TABLET, COATED ORAL at 19:46

## 2024-01-15 RX ADMIN — TRAMADOL HYDROCHLORIDE 50 MG: 50 TABLET, COATED ORAL at 08:59

## 2024-01-15 RX ADMIN — ESCITALOPRAM OXALATE 20 MG: 20 TABLET, FILM COATED ORAL at 08:41

## 2024-01-15 RX ADMIN — PIPERACILLIN AND TAZOBACTAM 3375 MG: 3; .375 INJECTION, POWDER, FOR SOLUTION INTRAVENOUS at 14:24

## 2024-01-15 RX ADMIN — FLUTICASONE PROPIONATE 1 SPRAY: 50 SPRAY, METERED NASAL at 08:40

## 2024-01-15 RX ADMIN — PIPERACILLIN AND TAZOBACTAM 3375 MG: 3; .375 INJECTION, POWDER, FOR SOLUTION INTRAVENOUS at 06:23

## 2024-01-15 RX ADMIN — SODIUM CHLORIDE, PRESERVATIVE FREE 10 ML: 5 INJECTION INTRAVENOUS at 08:43

## 2024-01-15 ASSESSMENT — PAIN SCALES - GENERAL
PAINLEVEL_OUTOF10: 8
PAINLEVEL_OUTOF10: 5

## 2024-01-15 ASSESSMENT — PAIN DESCRIPTION - LOCATION
LOCATION: FLANK
LOCATION: FLANK

## 2024-01-15 ASSESSMENT — PAIN DESCRIPTION - PAIN TYPE: TYPE: SURGICAL PAIN

## 2024-01-15 ASSESSMENT — PAIN DESCRIPTION - DESCRIPTORS
DESCRIPTORS: ACHING
DESCRIPTORS: ACHING

## 2024-01-15 ASSESSMENT — PAIN DESCRIPTION - ORIENTATION
ORIENTATION: LEFT
ORIENTATION: LEFT

## 2024-01-15 ASSESSMENT — PAIN DESCRIPTION - ONSET: ONSET: ON-GOING

## 2024-01-15 ASSESSMENT — PAIN - FUNCTIONAL ASSESSMENT: PAIN_FUNCTIONAL_ASSESSMENT: ACTIVITIES ARE NOT PREVENTED

## 2024-01-15 ASSESSMENT — PAIN DESCRIPTION - FREQUENCY: FREQUENCY: INTERMITTENT

## 2024-01-15 ASSESSMENT — PAIN SCALES - WONG BAKER: WONGBAKER_NUMERICALRESPONSE: 2

## 2024-01-15 NOTE — CARE COORDINATION
1/15/24, 11:33 AM EST    DISCHARGE PLANNING EVALUATION       Spoke with Radha at [facility] and she is waiting on corporate to determine if they can do a one time contract.

## 2024-01-15 NOTE — CARE COORDINATION
1/15/24, 1:53 PM EST    DISCHARGE PLANNING EVALUATION     Spoke with Radha at [facility] and they can try to obtain a pre-cert.  Patient will need seen by PT/OT for a pre-cert to be started.

## 2024-01-15 NOTE — PLAN OF CARE
Problem: Pain  Goal: Verbalizes/displays adequate comfort level or baseline comfort level  Outcome: Progressing     Problem: Safety - Adult  Goal: Free from fall injury  Outcome: Progressing     Problem: ABCDS Injury Assessment  Goal: Absence of physical injury  Outcome: Progressing     Problem: Skin/Tissue Integrity  Goal: Absence of new skin breakdown  Description: 1.  Monitor for areas of redness and/or skin breakdown  2.  Assess vascular access sites hourly  3.  Every 4-6 hours minimum:  Change oxygen saturation probe site  4.  Every 4-6 hours:  If on nasal continuous positive airway pressure, respiratory therapy assess nares and determine need for appliance change or resting period.  Outcome: Progressing     Problem: Chronic Conditions and Co-morbidities  Goal: Patient's chronic conditions and co-morbidity symptoms are monitored and maintained or improved  Outcome: Progressing     Problem: Respiratory - Adult  Goal: Clear lung sounds  Outcome: Progressing     Problem: Infection - Adult  Goal: Absence of infection during hospitalization  Outcome: Progressing     Problem: Discharge Planning  Goal: Discharge to home or other facility with appropriate resources  Outcome: Progressing     Problem: Nutrition Deficit:  Goal: Optimize nutritional status  Outcome: Progressing

## 2024-01-15 NOTE — TELEPHONE ENCOUNTER
Patient scheduled for CT ABDOMEN PELVIS WO  at [facility] on 7/3/24.  Arrival of 1230 PM for a 1 PM scan time.  Order mailed with instructions or given to the patient in the office     ORDER FAXED TO REJI FRIEDMAN [phone]

## 2024-01-15 NOTE — CARE COORDINATION
1/15/24, 2:38 PM EST    DISCHARGE ON GOING EVALUATION    Elli Coulter       [facility] day: 5  Location: -05/005-A Reason for admit: Perinephric abscess [N15.1]  Acute encephalopathy [G93.40]  AMS (altered mental status) [R41.82]   Procedure:   1/11 CT guided drain placement: Left Perirenal   Barriers to Discharge: Urology and ID following, Lovenox, Nicotine patch, Zosyn, prn Tylenol, Ultram and Zofran, BMP and CBC, PT/OT, strict I & O, drain care flushes, up as tolerated.   PCP: Miguel Romo MD  Readmission Risk Score: 26.9%  Patient Goals/Plan/Treatment Preferences: Mercedes is from [facility]. She is planned for SNF due to drain in place. SS following.

## 2024-01-16 LAB
ANION GAP SERPL CALC-SCNC: 11 MEQ/L (ref 8–16)
BUN SERPL-MCNC: 13 MG/DL (ref 7–22)
CALCIUM SERPL-MCNC: 10.3 MG/DL (ref 8.5–10.5)
CHLORIDE SERPL-SCNC: 103 MEQ/L (ref 98–111)
CO2 SERPL-SCNC: 24 MEQ/L (ref 23–33)
CREAT SERPL-MCNC: 0.7 MG/DL (ref 0.4–1.2)
DEPRECATED RDW RBC AUTO: 55.5 FL (ref 35–45)
ERYTHROCYTE [DISTWIDTH] IN BLOOD BY AUTOMATED COUNT: 16.9 % (ref 11.5–14.5)
GFR SERPL CREATININE-BSD FRML MDRD: > 60 ML/MIN/1.73M2
GLUCOSE SERPL-MCNC: 136 MG/DL (ref 70–108)
HCT VFR BLD AUTO: 35.6 % (ref 37–47)
HGB BLD-MCNC: 10.8 GM/DL (ref 12–16)
MCH RBC QN AUTO: 27.3 PG (ref 26–33)
MCHC RBC AUTO-ENTMCNC: 30.3 GM/DL (ref 32.2–35.5)
MCV RBC AUTO: 89.9 FL (ref 81–99)
PLATELET # BLD AUTO: 453 THOU/MM3 (ref 130–400)
PMV BLD AUTO: 8.3 FL (ref 9.4–12.4)
POTASSIUM SERPL-SCNC: 3.9 MEQ/L (ref 3.5–5.2)
RBC # BLD AUTO: 3.96 MILL/MM3 (ref 4.2–5.4)
SODIUM SERPL-SCNC: 138 MEQ/L (ref 135–145)
WBC # BLD AUTO: 7.4 THOU/MM3 (ref 4.8–10.8)

## 2024-01-16 PROCEDURE — 97162 PT EVAL MOD COMPLEX 30 MIN: CPT

## 2024-01-16 PROCEDURE — 85027 COMPLETE CBC AUTOMATED: CPT

## 2024-01-16 PROCEDURE — 2580000003 HC RX 258

## 2024-01-16 PROCEDURE — 6370000000 HC RX 637 (ALT 250 FOR IP)

## 2024-01-16 PROCEDURE — 97166 OT EVAL MOD COMPLEX 45 MIN: CPT

## 2024-01-16 PROCEDURE — 6360000002 HC RX W HCPCS

## 2024-01-16 PROCEDURE — 36415 COLL VENOUS BLD VENIPUNCTURE: CPT

## 2024-01-16 PROCEDURE — 6370000000 HC RX 637 (ALT 250 FOR IP): Performed by: PHYSICIAN ASSISTANT

## 2024-01-16 PROCEDURE — 97535 SELF CARE MNGMENT TRAINING: CPT

## 2024-01-16 PROCEDURE — 97116 GAIT TRAINING THERAPY: CPT

## 2024-01-16 PROCEDURE — 99232 SBSQ HOSP IP/OBS MODERATE 35: CPT | Performed by: PHYSICIAN ASSISTANT

## 2024-01-16 PROCEDURE — 1200000000 HC SEMI PRIVATE

## 2024-01-16 PROCEDURE — 80048 BASIC METABOLIC PNL TOTAL CA: CPT

## 2024-01-16 RX ADMIN — ASPIRIN 81 MG: 81 TABLET, COATED ORAL at 08:26

## 2024-01-16 RX ADMIN — PANTOPRAZOLE SODIUM 40 MG: 40 TABLET, DELAYED RELEASE ORAL at 16:12

## 2024-01-16 RX ADMIN — TRAZODONE HYDROCHLORIDE 200 MG: 100 TABLET ORAL at 19:51

## 2024-01-16 RX ADMIN — FLUTICASONE PROPIONATE 1 SPRAY: 50 SPRAY, METERED NASAL at 08:28

## 2024-01-16 RX ADMIN — LEVOTHYROXINE SODIUM 75 MCG: 0.07 TABLET ORAL at 05:39

## 2024-01-16 RX ADMIN — ATORVASTATIN CALCIUM 40 MG: 40 TABLET, FILM COATED ORAL at 19:51

## 2024-01-16 RX ADMIN — ENOXAPARIN SODIUM 40 MG: 100 INJECTION SUBCUTANEOUS at 08:28

## 2024-01-16 RX ADMIN — PANTOPRAZOLE SODIUM 40 MG: 40 TABLET, DELAYED RELEASE ORAL at 05:39

## 2024-01-16 RX ADMIN — CARVEDILOL 3.12 MG: 3.12 TABLET, FILM COATED ORAL at 08:27

## 2024-01-16 RX ADMIN — FLUTICASONE PROPIONATE 1 SPRAY: 50 SPRAY, METERED NASAL at 19:51

## 2024-01-16 RX ADMIN — FENOFIBRATE 54 MG: 54 TABLET ORAL at 08:27

## 2024-01-16 RX ADMIN — FOLIC ACID 1 MG: 1 TABLET ORAL at 08:27

## 2024-01-16 RX ADMIN — TRAMADOL HYDROCHLORIDE 50 MG: 50 TABLET, COATED ORAL at 16:21

## 2024-01-16 RX ADMIN — CARVEDILOL 3.12 MG: 3.12 TABLET, FILM COATED ORAL at 16:11

## 2024-01-16 RX ADMIN — TRAMADOL HYDROCHLORIDE 50 MG: 50 TABLET, COATED ORAL at 08:26

## 2024-01-16 RX ADMIN — PIPERACILLIN AND TAZOBACTAM 3375 MG: 3; .375 INJECTION, POWDER, FOR SOLUTION INTRAVENOUS at 16:08

## 2024-01-16 RX ADMIN — BUPROPION HYDROCHLORIDE 300 MG: 150 TABLET, EXTENDED RELEASE ORAL at 08:27

## 2024-01-16 RX ADMIN — PIPERACILLIN AND TAZOBACTAM 3375 MG: 3; .375 INJECTION, POWDER, FOR SOLUTION INTRAVENOUS at 06:32

## 2024-01-16 RX ADMIN — QUETIAPINE FUMARATE 600 MG: 400 TABLET ORAL at 19:51

## 2024-01-16 RX ADMIN — ESCITALOPRAM OXALATE 20 MG: 20 TABLET, FILM COATED ORAL at 08:27

## 2024-01-16 RX ADMIN — PIPERACILLIN AND TAZOBACTAM 3375 MG: 3; .375 INJECTION, POWDER, FOR SOLUTION INTRAVENOUS at 23:01

## 2024-01-16 ASSESSMENT — PAIN DESCRIPTION - PAIN TYPE
TYPE: SURGICAL PAIN

## 2024-01-16 ASSESSMENT — PAIN DESCRIPTION - LOCATION
LOCATION: FLANK

## 2024-01-16 ASSESSMENT — PAIN DESCRIPTION - ONSET
ONSET: ON-GOING

## 2024-01-16 ASSESSMENT — PAIN SCALES - GENERAL
PAINLEVEL_OUTOF10: 4
PAINLEVEL_OUTOF10: 8
PAINLEVEL_OUTOF10: 8

## 2024-01-16 ASSESSMENT — PAIN DESCRIPTION - ORIENTATION
ORIENTATION: LEFT

## 2024-01-16 ASSESSMENT — PAIN DESCRIPTION - FREQUENCY
FREQUENCY: INTERMITTENT

## 2024-01-16 ASSESSMENT — PAIN - FUNCTIONAL ASSESSMENT
PAIN_FUNCTIONAL_ASSESSMENT: ACTIVITIES ARE NOT PREVENTED

## 2024-01-16 ASSESSMENT — PAIN DESCRIPTION - DESCRIPTORS
DESCRIPTORS: ACHING

## 2024-01-16 NOTE — DISCHARGE INSTR - COC
Continuity of Care Form    Patient Name: Elli Coulter   :  1955  MRN:  [id number]    Admit date:  2024  Discharge date:  24    Code Status Order: Full Code   Advance Directives:     Admitting Physician:  Dayanara Garcia PA-C  PCP: Miguel Romo MD    Discharging Nurse: JOSE Moseley RN   Discharging Hospital Unit/Room#: 5K-05/005-A  Discharging Unit Phone Number: [phone]    Emergency Contact:   Extended Emergency Contact Information  Primary Emergency Contact: Bailey Trimble   [facility]  Home Phone: [phone]  Mobile Phone: [phone]  Relation: Brother/Sister  Hearing or visual needs: None  Other needs: None  Preferred language: English   needed? No  Secondary Emergency Contact: [facility] Phone: [phone]  Relation: Other    Past Surgical History:  Past Surgical History:   Procedure Laterality Date    ABDOMEN SURGERY      x4 removed cysts and ovary removed    CARDIAC SURGERY      heart caths, no stents    CARPAL TUNNEL RELEASE Bilateral 2016    CHOLECYSTECTOMY, LAPAROSCOPIC  6/12/15    Dr. Huff    COLONOSCOPY      CT DRAIN SOFT TISSUE ABSCESS  2024    CT DRAIN SOFT TISSUE ABSCESS 2024 [facility] CT SCAN    CYSTOSCOPY N/A 2/10/2022    CYSTOSCOPY URETHRAL IMPLANT INJECTION performed by Sami Lanier MD at [facility] OR    CYSTOSCOPY N/A 2022    Cystoscopy Bladder Botox 200 units Left Stent Placement performed by Sami Lanier MD at [facility] OR    CYSTOSCOPY N/A 2023    CYSTOSCOPY WITH BLADDER BOTOX 200 UNITS performed by Sami Lanier MD at [facility] OR    CYSTOSCOPY W BIOPSY OF BLADDER N/A 2020    CYSTOSCOPY WITH BLADDER BIOPSIES performed by Chris Avila MD at [facility] OR    DILATATION, ESOPHAGUS      EGD      ELBOW SURGERY Right 10/7/2004    Dr. Cronin    ELBOW SURGERY Bilateral 2016    decompression    HYSTERECTOMY (CERVIX STATUS UNKNOWN)      Dr. Manuel HUTCHINSON with [facility]    MANDIBLE FRACTURE SURGERY  1984

## 2024-01-16 NOTE — PLAN OF CARE
Problem: Pain  Goal: Verbalizes/displays adequate comfort level or baseline comfort level  Outcome: Progressing  Flowsheets (Taken 1/16/2024 0000)  Verbalizes/displays adequate comfort level or baseline comfort level:   Encourage patient to monitor pain and request assistance   Implement non-pharmacological measures as appropriate and evaluate response   Assess pain using appropriate pain scale   Administer analgesics based on type and severity of pain and evaluate response     Problem: Safety - Adult  Goal: Free from fall injury  Outcome: Progressing  Flowsheets (Taken 1/16/2024 0000)  Free From Fall Injury: Instruct family/caregiver on patient safety  Note: Patient has remained free of physical injury during this shift. Safe environment provided, call light within reach, and hourly rounding completed.      Problem: ABCDS Injury Assessment  Goal: Absence of physical injury  Outcome: Progressing  Flowsheets (Taken 1/16/2024 0000)  Absence of Physical Injury: Implement safety measures based on patient assessment  Note: Patient has remained free of physical injury during this shift. Safe environment provided, call light within reach, and hourly rounding completed.      Problem: Chronic Conditions and Co-morbidities  Goal: Patient's chronic conditions and co-morbidity symptoms are monitored and maintained or improved  Outcome: Progressing     Problem: Discharge Planning  Goal: Discharge to home or other facility with appropriate resources  Outcome: Progressing  Flowsheets (Taken 1/16/2024 0000)  Discharge to home or other facility with appropriate resources:   Identify barriers to discharge with patient and caregiver   Identify discharge learning needs (meds, wound care, etc)   Arrange for needed discharge resources and transportation as appropriate   Care plan reviewed with patient.  Patient verbalize understanding of the plan of care and contribute to goal setting.

## 2024-01-16 NOTE — CARE COORDINATION
1/16/24, 11:41 AM EST    DISCHARGE PLANNING EVALUATION     Call to [facility], they will start precert.

## 2024-01-16 NOTE — PLAN OF CARE
Problem: Pain  Goal: Verbalizes/displays adequate comfort level or baseline comfort level  1/16/2024 0835 by Victoriano Riojas RN  Outcome: Progressing     Problem: Safety - Adult  Goal: Free from fall injury  1/16/2024 0835 by Victoriano Riojas RN  Outcome: Progressing     Problem: ABCDS Injury Assessment  Goal: Absence of physical injury  1/16/2024 0835 by Victoriano Riojas RN  Outcome: Progressing     Problem: Skin/Tissue Integrity  Goal: Absence of new skin breakdown  Description: 1.  Monitor for areas of redness and/or skin breakdown  2.  Assess vascular access sites hourly  3.  Every 4-6 hours minimum:  Change oxygen saturation probe site  4.  Every 4-6 hours:  If on nasal continuous positive airway pressure, respiratory therapy assess nares and determine need for appliance change or resting period.  1/16/2024 0835 by Victoriano Riojas RN  Outcome: Progressing     Problem: Chronic Conditions and Co-morbidities  Goal: Patient's chronic conditions and co-morbidity symptoms are monitored and maintained or improved  1/16/2024 0835 by Victoriano Riojas RN  Outcome: Progressing     Problem: Respiratory - Adult  Goal: Clear lung sounds  1/16/2024 0835 by Victoriano Riojas RN  Outcome: Progressing     Problem: Infection - Adult  Goal: Absence of infection during hospitalization  1/16/2024 0835 by Victoriano Riojas RN  Outcome: Progressing     Problem: Discharge Planning  Goal: Discharge to home or other facility with appropriate resources  1/16/2024 0835 by Victoriano Riojas RN  Outcome: Progressing

## 2024-01-17 ENCOUNTER — APPOINTMENT (OUTPATIENT)
Dept: CT IMAGING | Age: 69
DRG: 689 | End: 2024-01-17
Payer: MEDICARE

## 2024-01-17 LAB
ANION GAP SERPL CALC-SCNC: 13 MEQ/L (ref 8–16)
BUN SERPL-MCNC: 16 MG/DL (ref 7–22)
CALCIUM SERPL-MCNC: 10.6 MG/DL (ref 8.5–10.5)
CHLORIDE SERPL-SCNC: 102 MEQ/L (ref 98–111)
CO2 SERPL-SCNC: 25 MEQ/L (ref 23–33)
CREAT SERPL-MCNC: 0.8 MG/DL (ref 0.4–1.2)
DEPRECATED RDW RBC AUTO: 55 FL (ref 35–45)
ERYTHROCYTE [DISTWIDTH] IN BLOOD BY AUTOMATED COUNT: 17.1 % (ref 11.5–14.5)
GFR SERPL CREATININE-BSD FRML MDRD: > 60 ML/MIN/1.73M2
GLUCOSE SERPL-MCNC: 139 MG/DL (ref 70–108)
HCT VFR BLD AUTO: 34.9 % (ref 37–47)
HGB BLD-MCNC: 10.8 GM/DL (ref 12–16)
MCH RBC QN AUTO: 27.7 PG (ref 26–33)
MCHC RBC AUTO-ENTMCNC: 30.9 GM/DL (ref 32.2–35.5)
MCV RBC AUTO: 89.5 FL (ref 81–99)
PLATELET # BLD AUTO: 427 THOU/MM3 (ref 130–400)
PMV BLD AUTO: 8.3 FL (ref 9.4–12.4)
POTASSIUM SERPL-SCNC: 4.5 MEQ/L (ref 3.5–5.2)
RBC # BLD AUTO: 3.9 MILL/MM3 (ref 4.2–5.4)
SODIUM SERPL-SCNC: 140 MEQ/L (ref 135–145)
WBC # BLD AUTO: 8.3 THOU/MM3 (ref 4.8–10.8)

## 2024-01-17 PROCEDURE — 74177 CT ABD & PELVIS W/CONTRAST: CPT

## 2024-01-17 PROCEDURE — 6360000002 HC RX W HCPCS

## 2024-01-17 PROCEDURE — 99232 SBSQ HOSP IP/OBS MODERATE 35: CPT | Performed by: PHYSICIAN ASSISTANT

## 2024-01-17 PROCEDURE — 6370000000 HC RX 637 (ALT 250 FOR IP)

## 2024-01-17 PROCEDURE — 2580000003 HC RX 258

## 2024-01-17 PROCEDURE — 6360000004 HC RX CONTRAST MEDICATION: Performed by: PHYSICIAN ASSISTANT

## 2024-01-17 PROCEDURE — 1200000000 HC SEMI PRIVATE

## 2024-01-17 PROCEDURE — 80048 BASIC METABOLIC PNL TOTAL CA: CPT

## 2024-01-17 PROCEDURE — 6370000000 HC RX 637 (ALT 250 FOR IP): Performed by: PHYSICIAN ASSISTANT

## 2024-01-17 PROCEDURE — 36415 COLL VENOUS BLD VENIPUNCTURE: CPT

## 2024-01-17 PROCEDURE — 85027 COMPLETE CBC AUTOMATED: CPT

## 2024-01-17 RX ORDER — TRAMADOL HYDROCHLORIDE 50 MG/1
50 TABLET ORAL EVERY 6 HOURS PRN
Qty: 12 TABLET | Refills: 0 | Status: SHIPPED | OUTPATIENT
Start: 2024-01-17 | End: 2024-01-20

## 2024-01-17 RX ORDER — TRAMADOL HYDROCHLORIDE 50 MG/1
50 TABLET ORAL EVERY 6 HOURS PRN
Qty: 12 TABLET | Refills: 0 | Status: SHIPPED | OUTPATIENT
Start: 2024-01-17 | End: 2024-01-17

## 2024-01-17 RX ADMIN — TRAMADOL HYDROCHLORIDE 50 MG: 50 TABLET, COATED ORAL at 10:39

## 2024-01-17 RX ADMIN — FENOFIBRATE 54 MG: 54 TABLET ORAL at 08:06

## 2024-01-17 RX ADMIN — IOPAMIDOL 80 ML: 755 INJECTION, SOLUTION INTRAVENOUS at 11:15

## 2024-01-17 RX ADMIN — ACETAMINOPHEN 650 MG: 325 TABLET ORAL at 21:40

## 2024-01-17 RX ADMIN — PANTOPRAZOLE SODIUM 40 MG: 40 TABLET, DELAYED RELEASE ORAL at 17:20

## 2024-01-17 RX ADMIN — CARVEDILOL 3.12 MG: 3.12 TABLET, FILM COATED ORAL at 17:12

## 2024-01-17 RX ADMIN — CARVEDILOL 3.12 MG: 3.12 TABLET, FILM COATED ORAL at 08:06

## 2024-01-17 RX ADMIN — BUPROPION HYDROCHLORIDE 300 MG: 150 TABLET, EXTENDED RELEASE ORAL at 08:06

## 2024-01-17 RX ADMIN — FOLIC ACID 1 MG: 1 TABLET ORAL at 08:06

## 2024-01-17 RX ADMIN — ACETAMINOPHEN 650 MG: 325 TABLET ORAL at 13:49

## 2024-01-17 RX ADMIN — TRAMADOL HYDROCHLORIDE 50 MG: 50 TABLET, COATED ORAL at 17:10

## 2024-01-17 RX ADMIN — PIPERACILLIN AND TAZOBACTAM 3375 MG: 3; .375 INJECTION, POWDER, FOR SOLUTION INTRAVENOUS at 17:11

## 2024-01-17 RX ADMIN — ASPIRIN 81 MG: 81 TABLET, COATED ORAL at 08:06

## 2024-01-17 RX ADMIN — LEVOTHYROXINE SODIUM 75 MCG: 0.07 TABLET ORAL at 04:53

## 2024-01-17 RX ADMIN — ESCITALOPRAM OXALATE 20 MG: 20 TABLET, FILM COATED ORAL at 08:06

## 2024-01-17 RX ADMIN — SODIUM CHLORIDE, PRESERVATIVE FREE 10 ML: 5 INJECTION INTRAVENOUS at 20:12

## 2024-01-17 RX ADMIN — ENOXAPARIN SODIUM 40 MG: 100 INJECTION SUBCUTANEOUS at 08:06

## 2024-01-17 RX ADMIN — QUETIAPINE FUMARATE 600 MG: 400 TABLET ORAL at 20:10

## 2024-01-17 RX ADMIN — PANTOPRAZOLE SODIUM 40 MG: 40 TABLET, DELAYED RELEASE ORAL at 04:53

## 2024-01-17 RX ADMIN — PIPERACILLIN AND TAZOBACTAM 3375 MG: 3; .375 INJECTION, POWDER, FOR SOLUTION INTRAVENOUS at 07:36

## 2024-01-17 RX ADMIN — LEVOTHYROXINE SODIUM 150 MCG: 0.15 TABLET ORAL at 04:52

## 2024-01-17 RX ADMIN — ATORVASTATIN CALCIUM 40 MG: 40 TABLET, FILM COATED ORAL at 20:10

## 2024-01-17 RX ADMIN — TRAZODONE HYDROCHLORIDE 200 MG: 100 TABLET ORAL at 20:10

## 2024-01-17 ASSESSMENT — PAIN SCALES - GENERAL
PAINLEVEL_OUTOF10: 8
PAINLEVEL_OUTOF10: 8
PAINLEVEL_OUTOF10: 5
PAINLEVEL_OUTOF10: 8
PAINLEVEL_OUTOF10: 3
PAINLEVEL_OUTOF10: 9
PAINLEVEL_OUTOF10: 7

## 2024-01-17 ASSESSMENT — PAIN DESCRIPTION - LOCATION
LOCATION: ABDOMEN

## 2024-01-17 ASSESSMENT — PAIN DESCRIPTION - PAIN TYPE: TYPE: ACUTE PAIN

## 2024-01-17 ASSESSMENT — PAIN DESCRIPTION - ONSET: ONSET: ON-GOING

## 2024-01-17 ASSESSMENT — PAIN DESCRIPTION - ORIENTATION
ORIENTATION: LEFT;LOWER
ORIENTATION: LEFT;LOWER
ORIENTATION: LEFT
ORIENTATION: LEFT;LOWER
ORIENTATION: LEFT;LOWER

## 2024-01-17 ASSESSMENT — PAIN DESCRIPTION - DESCRIPTORS
DESCRIPTORS: ACHING;BURNING;DISCOMFORT
DESCRIPTORS: ACHING;DISCOMFORT
DESCRIPTORS: SHARP;DISCOMFORT
DESCRIPTORS: ACHING;BURNING
DESCRIPTORS: BURNING

## 2024-01-17 ASSESSMENT — PAIN - FUNCTIONAL ASSESSMENT: PAIN_FUNCTIONAL_ASSESSMENT: PREVENTS OR INTERFERES SOME ACTIVE ACTIVITIES AND ADLS

## 2024-01-17 ASSESSMENT — PAIN DESCRIPTION - FREQUENCY: FREQUENCY: CONTINUOUS

## 2024-01-17 NOTE — PLAN OF CARE
Problem: Pain  Goal: Verbalizes/displays adequate comfort level or baseline comfort level  1/16/2024 2101 by Gregorio Luna RN  Outcome: Progressing  Flowsheets (Taken 1/16/2024 0000 by Olamide Merino RN)  Verbalizes/displays adequate comfort level or baseline comfort level:   Encourage patient to monitor pain and request assistance   Implement non-pharmacological measures as appropriate and evaluate response   Assess pain using appropriate pain scale   Administer analgesics based on type and severity of pain and evaluate response  1/16/2024 0835 by Victoriano Riojas RN  Outcome: Progressing     Problem: Safety - Adult  Goal: Free from fall injury  1/16/2024 2101 by Gregorio Luna RN  Outcome: Progressing  Flowsheets (Taken 1/16/2024 0000 by Olamide Merino RN)  Free From Fall Injury: Instruct family/caregiver on patient safety  1/16/2024 0835 by Victoriano Riojas RN  Outcome: Progressing     Problem: ABCDS Injury Assessment  Goal: Absence of physical injury  1/16/2024 2101 by Gregorio Luna RN  Outcome: Progressing  Flowsheets (Taken 1/16/2024 0000 by Olamide Merino, RN)  Absence of Physical Injury: Implement safety measures based on patient assessment  1/16/2024 0835 by Victoriano Riojas RN  Outcome: Progressing     Problem: Skin/Tissue Integrity  Goal: Absence of new skin breakdown  Description: 1.  Monitor for areas of redness and/or skin breakdown  2.  Assess vascular access sites hourly  3.  Every 4-6 hours minimum:  Change oxygen saturation probe site  4.  Every 4-6 hours:  If on nasal continuous positive airway pressure, respiratory therapy assess nares and determine need for appliance change or resting period.  1/16/2024 2101 by Gregorio Luna RN  Outcome: Progressing  1/16/2024 0835 by Victoriano Riojas RN  Outcome: Progressing     Problem: Chronic Conditions and Co-morbidities  Goal: Patient's chronic conditions and co-morbidity symptoms are monitored and maintained or improved  1/16/2024

## 2024-01-17 NOTE — CARE COORDINATION
1/17/24, 8:51 AM EST    DISCHARGE PLANNING EVALUATION     Call from Radha with [facility] is approved through 1/18    ROBERTO met with pt this morning and updated on above. ROBERTO did ask if wanting SW to contact any family/friends to update. Pt asked to call ninikki Laguna. ROBERTO did call niece while at bedside, she was also updated from facility on acceptance and insurance approve. Pt did talk with niece as well.

## 2024-01-17 NOTE — CARE COORDINATION
PELON note    Discharge is pending CT A/P results. Pt will dc on PO Augmentin per ID. SW on for dc to [facility]. Precert is approved through 1/18. Anticipated dc tonight or tomorrow morning.          IMM Letter  IMM Letter given to Patient/Family/Significant other/Guardian/POA/by:: Justine PUTNAM CM  IMM Letter date given:: 01/17/24  IMM Letter time given:: 1224  Observation Status Letter date given:: 01/10/24  Observation Status Letter time given:: 1634  Observation Status Letter given to Patient/Family/Significant other/Guardian/POA/by:: patinet access

## 2024-01-18 ENCOUNTER — TELEPHONE (OUTPATIENT)
Dept: UROLOGY | Age: 69
End: 2024-01-18

## 2024-01-18 VITALS
TEMPERATURE: 97.7 F | OXYGEN SATURATION: 97 % | DIASTOLIC BLOOD PRESSURE: 54 MMHG | HEIGHT: 64 IN | WEIGHT: 165.57 LBS | RESPIRATION RATE: 18 BRPM | SYSTOLIC BLOOD PRESSURE: 140 MMHG | BODY MASS INDEX: 28.27 KG/M2 | HEART RATE: 59 BPM

## 2024-01-18 PROCEDURE — 2580000003 HC RX 258

## 2024-01-18 PROCEDURE — 6370000000 HC RX 637 (ALT 250 FOR IP): Performed by: PHYSICIAN ASSISTANT

## 2024-01-18 PROCEDURE — 6370000000 HC RX 637 (ALT 250 FOR IP)

## 2024-01-18 PROCEDURE — 99231 SBSQ HOSP IP/OBS SF/LOW 25: CPT | Performed by: UROLOGY

## 2024-01-18 PROCEDURE — 6360000002 HC RX W HCPCS

## 2024-01-18 RX ORDER — AMOXICILLIN AND CLAVULANATE POTASSIUM 875; 125 MG/1; MG/1
1 TABLET, FILM COATED ORAL 2 TIMES DAILY
Qty: 28 TABLET | Refills: 0 | Status: SHIPPED | OUTPATIENT
Start: 2024-01-18 | End: 2024-02-01

## 2024-01-18 RX ADMIN — LEVOTHYROXINE SODIUM 75 MCG: 0.07 TABLET ORAL at 05:51

## 2024-01-18 RX ADMIN — PIPERACILLIN AND TAZOBACTAM 3375 MG: 3; .375 INJECTION, POWDER, FOR SOLUTION INTRAVENOUS at 05:54

## 2024-01-18 RX ADMIN — FENOFIBRATE 54 MG: 54 TABLET ORAL at 09:13

## 2024-01-18 RX ADMIN — CARVEDILOL 3.12 MG: 3.12 TABLET, FILM COATED ORAL at 09:13

## 2024-01-18 RX ADMIN — TRAMADOL HYDROCHLORIDE 50 MG: 50 TABLET, COATED ORAL at 04:29

## 2024-01-18 RX ADMIN — ESCITALOPRAM OXALATE 20 MG: 20 TABLET, FILM COATED ORAL at 09:13

## 2024-01-18 RX ADMIN — TRAMADOL HYDROCHLORIDE 50 MG: 50 TABLET, COATED ORAL at 09:12

## 2024-01-18 RX ADMIN — BUPROPION HYDROCHLORIDE 300 MG: 150 TABLET, EXTENDED RELEASE ORAL at 09:13

## 2024-01-18 RX ADMIN — TRAMADOL HYDROCHLORIDE 50 MG: 50 TABLET, COATED ORAL at 14:49

## 2024-01-18 RX ADMIN — FOLIC ACID 1 MG: 1 TABLET ORAL at 09:13

## 2024-01-18 RX ADMIN — PANTOPRAZOLE SODIUM 40 MG: 40 TABLET, DELAYED RELEASE ORAL at 05:51

## 2024-01-18 RX ADMIN — ASPIRIN 81 MG: 81 TABLET, COATED ORAL at 09:12

## 2024-01-18 RX ADMIN — ENOXAPARIN SODIUM 40 MG: 100 INJECTION SUBCUTANEOUS at 09:12

## 2024-01-18 ASSESSMENT — PAIN DESCRIPTION - ORIENTATION
ORIENTATION: LEFT
ORIENTATION: LEFT
ORIENTATION: OUTER;LEFT

## 2024-01-18 ASSESSMENT — PAIN - FUNCTIONAL ASSESSMENT
PAIN_FUNCTIONAL_ASSESSMENT: ACTIVITIES ARE NOT PREVENTED
PAIN_FUNCTIONAL_ASSESSMENT: ACTIVITIES ARE NOT PREVENTED
PAIN_FUNCTIONAL_ASSESSMENT: PREVENTS OR INTERFERES SOME ACTIVE ACTIVITIES AND ADLS

## 2024-01-18 ASSESSMENT — PAIN DESCRIPTION - DESCRIPTORS
DESCRIPTORS: SORE
DESCRIPTORS: SHARP
DESCRIPTORS: SHARP;ACHING

## 2024-01-18 ASSESSMENT — PAIN SCALES - GENERAL
PAINLEVEL_OUTOF10: 7
PAINLEVEL_OUTOF10: 8
PAINLEVEL_OUTOF10: 7
PAINLEVEL_OUTOF10: 8
PAINLEVEL_OUTOF10: 8

## 2024-01-18 ASSESSMENT — PAIN DESCRIPTION - FREQUENCY: FREQUENCY: CONTINUOUS

## 2024-01-18 ASSESSMENT — PAIN DESCRIPTION - LOCATION
LOCATION: BACK
LOCATION: CHEST
LOCATION: CHEST

## 2024-01-18 ASSESSMENT — PAIN DESCRIPTION - ONSET: ONSET: ON-GOING

## 2024-01-18 ASSESSMENT — PAIN DESCRIPTION - PAIN TYPE: TYPE: ACUTE PAIN

## 2024-01-18 NOTE — TELEPHONE ENCOUNTER
L renal abscess  Has drain back in  Needs OV in 4-6 wks with adilia  Ct prior  ID plans to cont abx 4 wks

## 2024-01-18 NOTE — DISCHARGE SUMMARY
Resident Discharge Summary (Hospitalist)      Patient Identification:   Elli Coulter   : 1955  MRN: [id number]   Account: [id number]   Patient's PCP: Miguel Romo MD    Admit Date: 2024   Discharge Date:   2024    Admitting Physician: Dayanara Garcia PA-C  Discharge Physician: Darrick Zheng MD       Discharge Diagnoses:  Left perinephritic abscess: CT abdomen/pelvis on  showed 4.3 x 2.4 cm posterior subcapsular collection of the left kidney.  Communicates with 5.8 x 2.7 cm left retrorenal fluid collection.  Drains were placed .  Patient was started on Zosyn.  CT abdomen/pelvis : Stable 5.6 x 3.1 subcapsular collection of left kidney.  Small residual 3 x 1.4 cm collection related to left psoas.  Start Augmentin for 2 weeks  Obtain CT abdomen/pelvis prior to urology follow-up  Follow-up with urology outpatient  UTI: Urine culture positive for Burkholderia cepacia.  Patient received Zosyn inpatient.  Start Augmentin for 2 weeks  Follow-up with urology outpatient  Follow-up PCP outpatient  Chronic HFpEF: TTE 1/10/2024: EF 60-65%.  Mild concentric left ventricular hypertrophy no regional wall motion abnormalities.  Home medication: Coreg  Continue home medications  Follow-up with PCP  Chronic normocytic anemia, stable: Continue home medications  COPD: Without exacerbation.  Continue home inhalers.  Nonobstructive CAD: Status post [facility] on 2017.  50% RCA.  Home medications: ASA, atorvastatin  Continue home medications.  Hypothyroidism: Home medication Synthroid  Continue home medication  GERD: Home medication: Protonix  Continue home medication  Anxiety/depression: Home medication: Wellbutrin, Seroquel, Lexapro  Continue home medication  Physical deconditioning: Patient be discharged to SNF.  Mild hyponatremia, resolved  Acute metabolic encephalopathy, resolved: Secondary to abscess/UTI.      Hospital Course:   Elli Coulter is a 68 y.o. female with PMHx COPD, GERD,

## 2024-01-18 NOTE — CARE COORDINATION
1/18/24, 9:05 AM EST    DISCHARGE PLANNING EVALUATION    ROBERTO met with pt this morning, discussed anticipating discharge today. ROBERTO did call [facility] for transport, they are working on this and will get back with SW on a time. ROBERTO faxed transport paperwork and copy placed on chart.     11:04 AM EST  Call to [facility], they are sitting waiting to hear back from  for transport. ROBERTO visited with Mercedes again this morning, updated on provider planning on discharge, waiting on transport time. ROBERTO did ask if wanting to call anyone in family to update, she would like to call ninikki. ROBERTO did call pt's gamal Laguna at bedside, updated on plans for discharge today, waiting on transport time. ROBERTO did ask pt if wanting SW to call son Johnny as SW did talk with him earlier in her stay due to pt being confused, pt declines.     1/18/24, 11:08 AM EST    Patient goals/plan/ treatment preferences discussed by  and .  Patient goals/plan/ treatment preferences reviewed with patient/ family.  Patient/ family verbalize understanding of discharge plan and are in agreement with goal/plan/treatment preferences.  Understanding was demonstrated using the teach back method.  AVS provided by RN at time of discharge, which includes all necessary medical information pertaining to the patients current course of illness, treatment, post-discharge goals of care, and treatment preferences.     Services At/After Discharge: Skilled Nursing Facility (SNF) and In amb[facility]       IMM Letter  IMM Letter given to Patient/Family/Significant other/Guardian/POA/by:: Justine PUTNAM   IMM Letter date given:: 01/17/24  IMM Letter time given:: 1224  Observation Status Letter date given:: 01/10/24  Observation Status Letter time given:: 1634  Observation Status Letter given to Patient/Family/Significant other/Guardian/POA/by:: gerry access     Call from [facility], they can pick pt up at 2:45pm. ROBERTO

## 2024-01-18 NOTE — PROGRESS NOTES
Progress note: Infectious diseases    Patient - Elli Coulter,  Age - 68 y.o.    - 1955      Room Number - 5K-05/005-A   MRN -  [id number]   [facility] # - [id number]  Date of Admission -  2024  9:04 PM    SUBJECTIVE:   She has abdominal pain, denies any nausea or vomiting. Denies chills  OBJECTIVE   VITALS    height is 1.626 m (5' 4\") and weight is 75.1 kg (165 lb 9.1 oz). Her oral temperature is 97.8 °F (36.6 °C). Her blood pressure is 160/67 (abnormal) and her pulse is 59. Her respiration is 16 and oxygen saturation is 95%.       Wt Readings from Last 3 Encounters:   24 75.1 kg (165 lb 9.1 oz)   24 74.4 kg (164 lb)   24 72.6 kg (160 lb)       I/O (24 Hours)    Intake/Output Summary (Last 24 hours) at 2024 1502  Last data filed at 2024 1300  Gross per 24 hour   Intake 1320 ml   Output 200 ml   Net 1120 ml         General Appearance  Awake, alert, oriented, chronically sick looking  HEENT - normocephalic, atraumatic,pale  conjunctiva,  anicteric sclera  Neck - Supple, no mass  Lungs -  Bilateral  air entry, no rhonchi, no wheeze  Cardiovascular - Heart sounds are normal.    Abdomen - soft, not distended, nontender, left perinephric drain.  Neurologic -oriented.  Skin - No bruising or bleeding  Extremities - No edema, no cyanosis, clubbing     MEDICATIONS:      aspirin  81 mg Oral Daily    atorvastatin  40 mg Oral Nightly    buPROPion  300 mg Oral QAM    carvedilol  3.125 mg Oral BID WC    escitalopram  20 mg Oral Daily    fenofibrate  54 mg Oral Daily    fluticasone  1 spray Each Nostril BID    folic acid  1 mg Oral Daily    levothyroxine  75 mcg Oral Once per day on  Sat    pantoprazole  40 mg Oral BID AC    QUEtiapine  600 mg Oral Nightly    traZODone  200 mg Oral Nightly    sodium chloride flush  5-40 mL IntraVENous 2 times per day    enoxaparin  40 mg SubCUTAneous 
                                                                                          Progress note: Infectious diseases    Patient - Elli Coulter,  Age - 68 y.o.    - 1955      Room Number - 5K-05/005-A   MRN -  [id number]   [facility] # - [id number]  Date of Admission -  2024  9:04 PM    SUBJECTIVE:   No new complaints  OBJECTIVE   VITALS    height is 1.626 m (5' 4\") and weight is 72.6 kg (160 lb 0.9 oz). Her oral temperature is 97.8 °F (36.6 °C). Her blood pressure is 130/87 and her pulse is 71. Her respiration is 18 and oxygen saturation is 98%.       Wt Readings from Last 3 Encounters:   24 72.6 kg (160 lb 0.9 oz)   24 74.4 kg (164 lb)   24 72.6 kg (160 lb)       I/O (24 Hours)    Intake/Output Summary (Last 24 hours) at 2024 0936  Last data filed at 2024 0554  Gross per 24 hour   Intake 1917.88 ml   Output 400 ml   Net 1517.88 ml         General Appearance  Awake, alert, oriented, chronically sick looking  HEENT - normocephalic, atraumatic,pale  conjunctiva,  anicteric sclera  Neck - Supple, no mass  Lungs -  Bilateral  air entry, no rhonchi, no wheeze  Cardiovascular - Heart sounds are normal.    Abdomen - soft, not distended, nontender, left perinephric drain.  Neurologic -oriented.  Skin - No bruising or bleeding  Extremities - No edema, no cyanosis, clubbing     MEDICATIONS:      aspirin  81 mg Oral Daily    atorvastatin  40 mg Oral Nightly    buPROPion  300 mg Oral QAM    carvedilol  3.125 mg Oral BID WC    escitalopram  20 mg Oral Daily    fenofibrate  54 mg Oral Daily    fluticasone  1 spray Each Nostril BID    folic acid  1 mg Oral Daily    levothyroxine  75 mcg Oral Once per day on Mon Tue Wed Thu Fri Sat    pantoprazole  40 mg Oral BID AC    QUEtiapine  600 mg Oral Nightly    traZODone  200 mg Oral Nightly    sodium chloride flush  5-40 mL IntraVENous 2 times per day    enoxaparin  40 mg SubCUTAneous Daily    nicotine  1 patch TransDERmal Daily    
                                                                                          Progress note: Infectious diseases    Patient - Elli Coulter,  Age - 68 y.o.    - 1955      Room Number - 5K-05/005-A   MRN -  [id number]   [facility] # - [id number]  Date of Admission -  2024  9:04 PM    SUBJECTIVE:   She has pain on the left flank area. Drain functioning  OBJECTIVE   VITALS    height is 1.626 m (5' 4\") and weight is 75.1 kg (165 lb 9.1 oz). Her oral temperature is 97.7 °F (36.5 °C). Her blood pressure is 140/54 (abnormal) and her pulse is 59. Her respiration is 18 and oxygen saturation is 97%.       Wt Readings from Last 3 Encounters:   24 75.1 kg (165 lb 9.1 oz)   24 74.4 kg (164 lb)   24 72.6 kg (160 lb)       I/O (24 Hours)    Intake/Output Summary (Last 24 hours) at 2024 0931  Last data filed at 2024 0601  Gross per 24 hour   Intake 750 ml   Output 1050 ml   Net -300 ml         General Appearance  Awake, alert, oriented, chronically sick looking  HEENT - normocephalic, atraumatic,pale  conjunctiva,  anicteric sclera  Neck - Supple, no mass  Lungs -  Bilateral  air entry, no rhonchi, no wheeze  Cardiovascular - Heart sounds are normal.    Abdomen - soft, not distended, nontender, left perinephric drain.  Neurologic -oriented.  Skin - No bruising or bleeding  Extremities - No edema, no cyanosis, clubbing     MEDICATIONS:      aspirin  81 mg Oral Daily    atorvastatin  40 mg Oral Nightly    buPROPion  300 mg Oral QAM    carvedilol  3.125 mg Oral BID WC    escitalopram  20 mg Oral Daily    fenofibrate  54 mg Oral Daily    fluticasone  1 spray Each Nostril BID    folic acid  1 mg Oral Daily    levothyroxine  75 mcg Oral Once per day on  Sat    pantoprazole  40 mg Oral BID AC    QUEtiapine  600 mg Oral Nightly    traZODone  200 mg Oral Nightly    sodium chloride flush  5-40 mL IntraVENous 2 times per day    enoxaparin  40 mg SubCUTAneous Daily    
                                                                                          Progress note: Infectious diseases    Patient - Elli Coulter,  Age - 68 y.o.    - 1955      Room Number - 5K-05/005-A   MRN -  [id number]   [facility] # - [id number]  Date of Admission -  2024  9:04 PM    SUBJECTIVE:   No new issues  OBJECTIVE   VITALS    height is 1.626 m (5' 4\") and weight is 71 kg (156 lb 8.4 oz). Her oral temperature is 98.3 °F (36.8 °C). Her blood pressure is 130/62 and her pulse is 60. Her respiration is 16 and oxygen saturation is 96%.       Wt Readings from Last 3 Encounters:   24 71 kg (156 lb 8.4 oz)   24 74.4 kg (164 lb)   24 72.6 kg (160 lb)       I/O (24 Hours)    Intake/Output Summary (Last 24 hours) at 1/15/2024 1029  Last data filed at 1/15/2024 1004  Gross per 24 hour   Intake 2622.1 ml   Output 1500 ml   Net 1122.1 ml         General Appearance  Awake, alert, oriented, chronically sick looking  HEENT - normocephalic, atraumatic,pale  conjunctiva,  anicteric sclera  Neck - Supple, no mass  Lungs -  Bilateral  air entry, no rhonchi, no wheeze  Cardiovascular - Heart sounds are normal.    Abdomen - soft, not distended, nontender, left perinephric drain.  Neurologic -oriented.  Skin - No bruising or bleeding  Extremities - No edema, no cyanosis, clubbing     MEDICATIONS:      aspirin  81 mg Oral Daily    atorvastatin  40 mg Oral Nightly    buPROPion  300 mg Oral QAM    carvedilol  3.125 mg Oral BID WC    escitalopram  20 mg Oral Daily    fenofibrate  54 mg Oral Daily    fluticasone  1 spray Each Nostril BID    folic acid  1 mg Oral Daily    levothyroxine  75 mcg Oral Once per day on Mon Tue Wed Thu Fri Sat    pantoprazole  40 mg Oral BID AC    QUEtiapine  600 mg Oral Nightly    traZODone  200 mg Oral Nightly    sodium chloride flush  5-40 mL IntraVENous 2 times per day    enoxaparin  40 mg SubCUTAneous Daily    nicotine  1 patch TransDERmal Daily    
                                                                                          Progress note: Infectious diseases    Patient - Elli Coulter,  Age - 68 y.o.    - 1955      Room Number - 5K-05/005-A   MRN -  [id number]   [facility] # - [id number]  Date of Admission -  2024  9:04 PM    SUBJECTIVE:   She denies any fever or chills.  OBJECTIVE   VITALS    height is 1.626 m (5' 4\") and weight is 71 kg (156 lb 8.4 oz). Her oral temperature is 97.3 °F (36.3 °C). Her blood pressure is 117/69 and her pulse is 48 (abnormal). Her respiration is 18 and oxygen saturation is 95%.       Wt Readings from Last 3 Encounters:   24 71 kg (156 lb 8.4 oz)   24 74.4 kg (164 lb)   24 72.6 kg (160 lb)       I/O (24 Hours)    Intake/Output Summary (Last 24 hours) at 2024 1329  Last data filed at 2024 1254  Gross per 24 hour   Intake 2177.45 ml   Output 1125 ml   Net 1052.45 ml       General Appearance  Awake, alert, oriented, chronically sick looking  HEENT - normocephalic, atraumatic, pink conjunctiva,  anicteric sclera  Neck - Supple, no mass  Lungs -  Bilateral  air entry, no rhonchi, no wheeze  Cardiovascular - Heart sounds are normal.    Abdomen - soft, not distended, nontender, left perinephric drain.  Neurologic -oriented.  Skin - No bruising or bleeding  Extremities - No edema, no cyanosis, clubbing     MEDICATIONS:      aspirin  81 mg Oral Daily    atorvastatin  40 mg Oral Nightly    buPROPion  300 mg Oral QAM    carvedilol  3.125 mg Oral BID WC    escitalopram  20 mg Oral Daily    fenofibrate  54 mg Oral Daily    fluticasone  1 spray Each Nostril BID    folic acid  1 mg Oral Daily    levothyroxine  75 mcg Oral Once per day on  Sat    pantoprazole  40 mg Oral BID AC    QUEtiapine  600 mg Oral Nightly    traZODone  200 mg Oral Nightly    sodium chloride flush  5-40 mL IntraVENous 2 times per day    enoxaparin  40 mg SubCUTAneous Daily    nicotine  1 patch TransDERmal 
        Hospitalist Progress Note    Patient:  Elli Coulter    YOB: 1955  Unit/Bed:5K-05/005-A  Date of Admission: 1/9/2024      Assessment/Plan:    Left perinephric abscess:   CT reports 4.3x2.4cm posterior scapular collection of the left kidney.  Communicates with a 5.8x2.7cm left retrorenal fluid collection. Stable from before.  S/p IR guided drain placement 1/11.    Culture (+) proteus miabilis - sensitive to zosyn, continue.  ID and urology following  Plan to repeat CT A/P in 1-2 days to decide on drain.     UTI:   ID and urology following.  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 ID on board     Acute metabolic encephalopathy:   Secondary to #1/2.  Improving    Chronic HFpEF:   Echo 1/10/24 EF 60-65%.   No overt signs of decompensation. Not on diuertics at home.   CXR with mild pulmonary edema, pt is ASX. Monitor for need for diuresis.     Mild hyponatremia:   Monitor BMP daily. Resolved.     Chronic normocytic anemia:   Hgb stable with baseline. Cont home ferrous sulfate, folic acid.     COPD: without exacerbation. Cont home inhalers.     Nonobstructive CAD:  Last [facility] 11/2017. 50% RCA. On ASA + statin therapy, continue.      Hypothyroidism: synthroid     GERD: PPI    Anxiety/depression: cont home meds.     Physical deconditioning: PT/OT. From ECF.    Tobacco abuse: 40+ PYH. Nicotine patch ordered.        Chief Complaint: confusion    HPI / Hospital Course:   Initial HPI: \"Elli Coulter is a 68 y.o. female with PMHx of Hx of left-sided perinephric s/p drainage, hypothyroidism, anemia, COPD, HTN, HLD who presented to [facility] on 4/9/2024 for evaluation of AMS from nursing home.  Patient was recently hospitalized from 11/30 to 12/5/2023; she had similar complaints of left-sided abdominal tenderness and was found to have left-sided perinephric abscess which was drained patient was sent home on antibiotic course.  At nursing home patient had AMS and abdominal 
        Hospitalist Progress Note    Patient:  Elli Coulter    YOB: 1955  Unit/Bed:5K-05/005-A  Date of Admission: 1/9/2024      Assessment/Plan:    Left perinephric abscess:   CT reports 4.3x2.4cm posterior scapular collection of the left kidney.  Communicates with a 5.8x2.7cm left retrorenal fluid collection. Stable from before.  S/p IR guided drain placement 1/11.    Culture (+) proteus miabilis - sensitive to zosyn.   Pt started on Zosyn on admission, cont for now, follow final culture report.  ID and urology following    UTI:   ID and urology following.  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 ID on board     Acute metabolic encephalopathy:   Secondary to #1/2.  Improving    Chronic HFpEF:   Echo 1/10/24 EF 60-65%.   No overt signs of decompensation.   CXR with mild pulmonary edema, pt is ASX. Monitor for need for diuresis.     Mild hyponatremia:   Monitor BMP daily. Resolved.     Chronic normocytic anemia:   Hgb stable with baseline. Cont home ferrous sulfate, folic acid.     COPD: without exacerbation. Cont home inhalers.     Nonobstructive CAD:  Last [facility] 11/2017. 50% RCA. On ASA + statin therapy, continue.      Hypothyroidism: synthroid     GERD: PPI    Anxiety/depression: cont home meds.     Physical deconditioning: PT/OT. From ECF.    Tobacco abuse: 40+ PYH. Nicotine patch ordered.        Chief Complaint: confusion    HPI / Hospital Course:   Initial HPI: \"Elli Coulter is a 68 y.o. female with PMHx of Hx of left-sided perinephric s/p drainage, hypothyroidism, anemia, COPD, HTN, HLD who presented to [facility] on 4/9/2024 for evaluation of AMS from nursing home.  Patient was recently hospitalized from 11/30 to 12/5/2023; she had similar complaints of left-sided abdominal tenderness and was found to have left-sided perinephric abscess which was drained patient was sent home on antibiotic course.  At nursing home patient had AMS and abdominal tenderness; and 
        Hospitalist Progress Note    Patient:  Elli Coulter    YOB: 1955  Unit/Bed:5K-05/005-A  Date of Admission: 1/9/2024      Assessment/Plan:    Left perinephric abscess:   CT reports 4.3x2.4cm posterior scapular collection of the left kidney.  Communicates with a 5.8x2.7cm left retrorenal fluid collection. Stable from before.  S/p IR guided drain placement 1/11.    Culture (+) proteus miabilis - sensitive to zosyn.   Pt started on Zosyn on admission, cont for now, follow final culture report.  ID and urology following    UTI:   ID and urology following.  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 ID on board     Acute metabolic encephalopathy:   Secondary to #1/2.  Improving    Chronic HFpEF:   Echo 1/10/24 EF 60-65%.   No overt signs of decompensation.   CXR with mild pulmonary edema, pt is ASX. Monitor for need for diuresis.     Mild hyponatremia:   Monitor BMP daily. Resolved.     Chronic normocytic anemia:   Hgb stable with baseline. Cont home ferrous sulfate, folic acid.     COPD: without exacerbation. Cont home inhalers.     Nonobstructive CAD:  Last [facility] 11/2017. 50% RCA. On ASA + statin therapy, continue.      Hypothyroidism: synthroid     GERD: PPI    Anxiety/depression: cont home meds.     Physical deconditioning: PT/OT. From ECF.    Tobacco abuse: 40+ PYH. Nicotine patch ordered.        Chief Complaint: confusion    HPI / Hospital Course:   Initial HPI: \"Elli Coulter is a 68 y.o. female with PMHx of Hx of left-sided perinephric s/p drainage, hypothyroidism, anemia, COPD, HTN, HLD who presented to [facility] on 4/9/2024 for evaluation of AMS from nursing home.  Patient was recently hospitalized from 11/30 to 12/5/2023; she had similar complaints of left-sided abdominal tenderness and was found to have left-sided perinephric abscess which was drained patient was sent home on antibiotic course.  At nursing home patient had AMS and abdominal tenderness; and 
        Hospitalist Progress Note    Patient:  Elli Coulter    YOB: 1955  Unit/Bed:5K-05/005-A  Date of Admission: 1/9/2024      Assessment/Plan:    Left perinephric abscess:   CT reports 4.3x2.4cm posterior scapular collection of the left kidney.  Communicates with a 5.8x2.7cm left retrorenal fluid collection. Stable from before.  S/p IR guided drain placement 1/11.    Culture (+) proteus species.   Pt started on Zosyn on admission, cont for now, follow final culture report.  ID and urology following    UTI:   ID and urology following.  Urine culture positive for gram-negative bacilli.  Continue Zosyn for now, follow final culture and sensitivity.    Acute metabolic encephalopathy:   Secondary to #1/2.  Improving    Chronic HFpEF:   Echo 1/10/24 EF 60-65%.   No overt signs of decompensation.   CXR with mild pulmonary edema, pt is ASX. Monitor for need for diuresis.     Mild hyponatremia:   Monitor BMP daily. Resolved.     Chronic normocytic anemia:   Hgb stable with baseline. Cont home ferrous sulfate, folic acid.     COPD: without exacerbation. Cont home inhalers.     Nonobstructive CAD:  Last [facility] 11/2017. 50% RCA. On ASA + statin therapy, continue.      Hypothyroidism: synthroid     GERD: PPI    Anxiety/depression: cont home meds.     Physical deconditioning: PT/OT. From ECF.    Tobacco abuse: 40+ PYH. Nicotine patch ordered.        Chief Complaint: confusion    HPI / Hospital Course:   Initial HPI: \"Elli Coulter is a 68 y.o. female with PMHx of Hx of left-sided perinephric s/p drainage, hypothyroidism, anemia, COPD, HTN, HLD who presented to [facility] on 4/9/2024 for evaluation of AMS from nursing home.  Patient was recently hospitalized from 11/30 to 12/5/2023; she had similar complaints of left-sided abdominal tenderness and was found to have left-sided perinephric abscess which was drained patient was sent home on antibiotic course.  At nursing home patient had AMS and abdominal tenderness; 
    Urology Progress Note    Reason for Consult:  perinephric abscess reoccurence   Requesting Physician:  medicine     History Obtained From:  patient, electronic medical record     Chief Complaint: AMS, abd pain    Subjective: \"    Patient is resting in bed, voiding spontaneously,  tolerating regular diet, denies any nausea or vomiting. There are complaints of controlled pain at this time.    L drain with bloody drainage, RN to jared output and date on discharge  She reports improved pain with drain in place  Fluid +proteus, ID on board    Discharge with drain in place  ID managing abx  OV in 4-6 wks with imaging  ID planning for abx 4 wks  Ok for d/c from URO standpoint  Will follow peripherally call with questions  Discussed with medicine team and ID     Left sided perinephric abscess s/p recent drainage - Multiple hospitalization last yr. 1st episode on 1/15/23. Multiple IR guided abscess drainage, most recent 23.   Was recently seen at Urology office on 24; pigtail drain removed. Follows Dr. Lorenzo.   Recent CT AP: 4.3 x 2.4 cm posterior subcapsular collection of the left kidney. This probably communicates with a 5.8 x 2.7 cm left retrorenal fluid collection.   Previous Cx from : Proteus mirabilis, resistant. Sensitive to Zosyn.   Left renal stones - non obstructing, monitor outpatient  UTI - Ux Burkholderia cepacia group , cont abx  Acute metabolic encephalopathy - improving        Vitals:  BP (!) 140/54   Pulse 59   Temp 97.7 °F (36.5 °C) (Oral)   Resp 18   Ht 1.626 m (5' 4\")   Wt 75.1 kg (165 lb 9.1 oz)   SpO2 97%   BMI 28.42 kg/m²   Temp  Av.6 °F (36.4 °C)  Min: 97.3 °F (36.3 °C)  Max: 97.8 °F (36.6 °C)    Intake/Output Summary (Last 24 hours) at 2024 0951  Last data filed at 2024 0601  Gross per 24 hour   Intake 750 ml   Output 1050 ml   Net -300 ml         Social History     Socioeconomic History    Marital status:      Spouse name: Not on file    Number of 
   01/12/24 1008   Encounter Summary   Encounter Overview/Reason  Spiritual/Emotional Needs   Service Provided For: Patient   [facility]   Support System Family members   Last Encounter  01/12/24   Complexity of Encounter Low   Begin Time 1003   End Time  1008   Total Time Calculated 5 min   Spiritual/Emotional needs   Type Spiritual Support   Assessment/Intervention/Outcome   Assessment Coping   Intervention Sustaining Presence/Ministry of presence;Prayer (assurance of)/[place];Nurtured Hope   Outcome Comfort     Assessment:  In my encounter with the 68 yr old patient, while rounding  the unit 5K,  I provided spiritual care to patient through conversation, I also came to assess the patient's spiritual needs present. The pt was admitted due to perinephric abscess.     Interventions:  I provided prayer, emotional support and words of comfort.  provided a listening presence and encouraged pt to share their beliefs and how they support them during their hospitalization.     Outcomes:  The patient was encouraged and didn't share any further spiritual needs at this time.     Plan:  Chaplains will follow-up at a later time for assessment of any spiritual care needs present.  
1040, Patient received in CT scanning for pre-procedure assessment. Monitor applied.   1045 Dr. Pablo here; spoke to patient.  This procedure has been fully reviewed with the patient and written informed consent has been obtained.   1057 Patient assisted to CT table and pre scan started.  1102 Procedure started with Dr. Pablo.  1108 Samples collected and sent to lab for further review.   1110 Procedure completed; patient tolerated well. Post scan obtained.   1115 Patient on bed; comfort ensured.  1118 Report called to 5K and patient taken to 5K via bed.      
AVS faxed and report called to facility. Script located in blue packet. Transport set for 1445.   
All discharge instructions given to patient and family with no further questions at this time. Patient discharged off unit via wheelchair. Chart contents placed in yellow bin.     
[facility]  INPATIENT OCCUPATIONAL THERAPY  STRZ ONC MED 5K  EVALUATION    Time:   Time In: 935  Time Out: 1000  Timed Code Treatment Minutes: 15 Minutes  Minutes: 25          Date: 2024  Patient Name: Elli Coulter,   Gender: female      MRN: [id number]  : 1955  (68 y.o.)  Referring Practitioner: Dayanara Garcia PA-C  Diagnosis: Perinephric abscess  Additional Pertinent Hx: per chart review; Elli Coulter is a 68 y.o. female with PMHx of Hx of left-sided perinephric s/p drainage, hypothyroidism, anemia, COPD, HTN, HLD who presented to [facility] on 2024 for evaluation of AMS from nursing home.  Patient was recently hospitalized from  to 2023; she had similar complaints of left-sided abdominal tenderness and was found to have left-sided perinephric abscess which was drained patient was sent home on antibiotic course.  At nursing home patient had AMS and abdominal tenderness; and patient was brought in for further evaluation.  Upon arrival to ED, patient alert and oriented to self only.  Was unable to identify date and location.      On arrival to ED, patient was afebrile, normotensive, HR in 70s and satting low 90s on RA.  BMP was concerning for sodium of 131, chloride of 96, bicarb 20.  LFTs WNL.  TSH 5.8 and FT4 of 1.13.  UDS was negative.  Ethyl alcohol: Negative.  CBC was concerning for H&H of 10.5/32.7.  Influenza/COVID-negative.  UA showed moderate LE, 25-50 WBC, few bacteria.  CT head showed no acute intracranial abnormality.  CT AP showed 4.3 x 2.4 cm posterior subscapular collection of left kidney.  This probably communicates with a 5.8 x 2.7 cm left grossly in CXR showed mild pulmonary vascular congestion and cardiomegaly.  Patient was given a dose of Zosyn in ED.     Patient was admitted to hospital services for further evaluation.  Upon evaluation in ED, patient was more alert and oriented to to person, place and mostly time.  Patient was able to state she was at 
CHG bath given. Patient ambulated 200 ft in the hallway and is tolerating fairly well.  
Comprehensive Nutrition Assessment    Type and Reason for Visit:  Initial, Positive Nutrition Screen (weight loss)    Nutrition Recommendations/Plan:   Recommend diet as tolerated.  Trial Ensure High Protein BID - has received in the past and reports acceptance.  Will monitor need for additional nutrition interventions. Discussed appropriate use of ONS during times of poor intake     Malnutrition Assessment:  Malnutrition Status:  At risk for malnutrition (Comment) (01/11/24 6171)    Context:  Acute Illness     Findings of the 6 clinical characteristics of malnutrition:  Energy Intake:  Mild decrease in energy intake (Comment)  Weight Loss:   (-2.5% in 1 month (difficult to evaluate w/ CHF))     Body Fat Loss:  No significant body fat loss     Muscle Mass Loss:  No significant muscle mass loss    Fluid Accumulation:  Unable to assess (CHF)     Strength:  Not Performed    Nutrition Assessment:     Pt. nutritionally compromised AEB weight loss.  At risk for further nutrition compromise r/t admit with perinephric abscess, abdominal pain, UTI, metabolic encephalopathy and underlying medical condition (hx bipolar, COPD, depression, HLD, HTN, CHF).       Nutrition Related Findings:      Wound Type: Surgical Incision     Pt. Report/Treatments/Miscellaneous: pt. Seen - reports being hungry (NPO at time of visit); states appetite was \"alright\" pta; denies any nausea or abdominal pain at present; has received ONS in the past and states acceptance; s/p CT guided drain placement -left perirenal  GI Status: 5 BMs noted past 24 hours  Pertinent Labs: 1/11: Glucose 145, BUN 10, Cr 0.7  Pertinent Meds: Bumex, Folvite, Glycolax, Tricor      Current Nutrition Intake & Therapies:          ADULT DIET; Regular  ADULT ORAL NUTRITION SUPPLEMENT; Breakfast, Dinner; Low Calorie/High Protein Oral Supplement    Anthropometric Measures:  Height: 162.6 cm (5' 4\")  Ideal Body Weight (IBW): 120 lbs (55 kg)    Admission Body Weight: 71.9 kg 
[facility]  INPATIENT PHYSICAL THERAPY  EVALUATION  [facility] ONC MED 5K - 5K-05/005-A    Time In: 08  Time Out: 0837  Timed Code Treatment Minutes: 8 Minutes  Minutes: 18          Date: 2024  Patient Name: Elli Coulter,  Gender:  female        MRN: [id number]  : 1955  (68 y.o.)      Referring Practitioner: JENN Spivey  Diagnosis: perinephric abscess  Additional Pertinent Hx: Elli Coulter is a 68 y.o. female with PMHx of Hx of left-sided perinephric s/p drainage, hypothyroidism, anemia, COPD, HTN, HLD who presented to [facility] on 2024 for evaluation of AMS from nursing home.  Patient was recently hospitalized from  to 2023; she had similar complaints of left-sided abdominal tenderness and was found to have left-sided perinephric abscess which was drained patient was sent home on antibiotic course.  At nursing home patient had AMS and abdominal tenderness; and patient was brought in for further evaluation.  Upon arrival to ED, patient alert and oriented to self only.  Was unable to identify date and location. s/p CT guided drain placementfor left perinephric abscess on 24.     Restrictions/Precautions:  Restrictions/Precautions: Fall Risk  Position Activity Restriction  Other position/activity restrictions: drain    Subjective:  Chart Reviewed: Yes  Patient assessed for rehabilitation services?: Yes  Subjective: pt c/o fatigue and pain, with encouragement agreeable for PT. RN administered pain meds at beginning of session. Pt oriented to self and birthdate. Not oriented to situation or date.    General:        Hearing: Within functional limits       Pain: 8/10: stomach/back per pt    Vitals:  on room air with O2 sat at 95%, /87 MAP 99, HR 52 bpm    Social/Functional History:    Lives With: Alone  Type of Home: Assisted living  Home Layout: One level  Home Equipment: Walker, rolling                              Additional Comments: pt poor historian and no 
Patient ambulated 200 feet in hallway. CHG bath given. Drain site remains clean, dry and intact. NO S/S of infection. Still draining bloody fluid.   
Patient educated on how to use incentive spirometer. Patient verbalized understanding and demonstrated proper use. Emphasized importance and usage of device, with coughing and deep breathing every 2 hours while awake.     Patient educated on importance of early ambulation. Patient lethargic this morning and was having mild difficulty following commands. Patient ambulated around room and to bathroom. Informed patient goal was to ambulate in brito this shift.     Drain site is free from S/S of infection. NO drainage. Dressing intact. Sanguineus drainage in bag.     Patient educated on daily CHG soap.          
[facility]   PROGRESS NOTE      Patient: Elli Coulter  Room #: 5K-05/005-A            YOB: 1955  Age: 68 y.o.  Gender: female            Admit Date & Time: 1/9/2024  9:04 PM    Assessment:    The patient was attempted to be visited for a consult and is currently in active care. The patient declined the visit.     Interventions:  A the patient was unable to be visited at this time. A prayer was provided outside the patient's room.     Outcomes:  The patient continue to be followed by spiritual care.     Plan:  1.Spiritual care will continue to follow the patient according to [facility] spiritual care SOP.       Electronically signed by Chaplain Abdon, on 1/10/2024 at 1:02 PM.  Spiritual Care Department  [facility]  [phone]    
AMS and abdominal tenderness; and patient was brought in for further evaluation.  Upon arrival to ED, patient alert and oriented to self only.  Was unable to identify date and location.      On arrival to ED, patient was afebrile, normotensive, HR in 70s and satting low 90s on RA.  BMP was concerning for sodium of 131, chloride of 96, bicarb 20.  LFTs WNL.  TSH 5.8 and FT4 of 1.13.  UDS was negative.  Ethyl alcohol: Negative.  CBC was concerning for H&H of 10.5/32.7.  Influenza/COVID-negative.  UA showed moderate LE, 25-50 WBC, few bacteria.  CT head showed no acute intracranial abnormality.  CT AP showed 4.3 x 2.4 cm posterior subscapular collection of left kidney.  This probably communicates with a 5.8 x 2.7 cm left grossly in CXR showed mild pulmonary vascular congestion and cardiomegaly.  Patient was given a dose of Zosyn in ED.     Patient was admitted to hospital services for further evaluation.  Upon evaluation in ED, patient was more alert and oriented to to person, place and mostly time.  Patient was able to state she was at hospital, the year was 2023.  She knew her family, her kids, and where she lived.  She was also able to discuss her previous hospitalizations and her current clinical status\"     Zosyn started on admission (1/9). Urology and ID consulted.   S/p drain placement 1/11.   Fluid collection culture (+) proteus miabilis - sensitive to zosyn.   Urine culture (+) Burkholderia cepacia group     Subjective (past 24 hours): Pt reports continued severe pain at drain site. AOx3. Denies f/c, sob, cp, abd pain, nvd. Plan for ct tomorrow.       ROS: Pertinent positives as noted in HPI. All other systems reviewed and negative.      Past medical history, family history, social history and allergies reviewed again and is unchanged since admission.    Exam:  BP (!) 157/78   Pulse 61   Temp 97.2 °F (36.2 °C) (Oral)   Resp 18   Ht 1.626 m (5' 4\")   Wt 72.6 kg (160 lb 0.9 oz)   SpO2 98%   BMI 27.47 
Smoking status: Every Day     Current packs/day: 1.00     Average packs/day: 1 pack/day for 46.7 years (46.7 ttl pk-yrs)     Types: Cigarettes     Start date: 4/22/1977    Smokeless tobacco: Never    Tobacco comments:     Trying to quit   Vaping Use    Vaping Use: Never used   Substance and Sexual Activity    Alcohol use: No     Comment: none for 2 years    Drug use: Not Currently     Frequency: 1.0 times per week     Types: Cocaine    Sexual activity: Not Currently   Other Topics Concern    Not on file   Social History Narrative    Not on file     Social Determinants of Health     Financial Resource Strain: Not on file   Food Insecurity: No Food Insecurity (1/10/2024)    Hunger Vital Sign     Worried About Running Out of Food in the Last Year: Never true     Ran Out of Food in the Last Year: Never true   Recent Concern: Food Insecurity - Food Insecurity Present (11/30/2023)    Hunger Vital Sign     Worried About Running Out of Food in the Last Year: Sometimes true     Ran Out of Food in the Last Year: Sometimes true   Transportation Needs: Unmet Transportation Needs (1/10/2024)    PRAPARE - Transportation     Lack of Transportation (Medical): Yes     Lack of Transportation (Non-Medical): Yes   Physical Activity: Not on file   Stress: Not on file   Social Connections: Not on file   Intimate Partner Violence: Not on file   Housing Stability: Low Risk  (1/10/2024)    Housing Stability Vital Sign     Unable to Pay for Housing in the Last Year: No     Number of Places Lived in the Last Year: 2     Unstable Housing in the Last Year: No   Recent Concern: Housing Stability - High Risk (11/30/2023)    Housing Stability Vital Sign     Unable to Pay for Housing in the Last Year: Yes     Number of Places Lived in the Last Year: 2     Unstable Housing in the Last Year: No     Family History   Problem Relation Age of Onset    Cancer Mother     Heart Disease Mother     High Blood Pressure Mother     High Cholesterol Mother     
Weight:  (per pt. 165#; per EMR: 8/16/23: 171# 13 oz, 11/6/23: 173# 12 oz, 12/4/23: 162# 11 oz)                       BMI Categories: Overweight (BMI 25.0-29.9)    Estimated Daily Nutrient Needs:  Energy Requirements Based On: Kcal/kg  Weight Used for Energy Requirements: Other (Comment) (72)  Energy (kcal/day): 0355-1399 kcals (20-25)  Weight Used for Protein Requirements: Ideal (55)  Protein (g/day): 72+ grams (1.3+)     Nutrition Diagnosis:   Unintended weight loss related to inadequate protein-energy intake as evidenced by weight loss    Nutrition Interventions:   Food and/or Nutrient Delivery: Continue Current Diet, Discontinue Oral Nutrition Supplement  Nutrition Education/Counseling: Education initiated (Encouraged po, good nutrition at best efforts.)  Coordination of Nutrition Care: Continue to monitor while inpatient       Goals:  Previous Goal Met: Progressing toward Goal(s)  Goals: PO intake 75% or greater, by next RD assessment       Nutrition Monitoring and Evaluation:      Food/Nutrient Intake Outcomes: Diet Advancement/Tolerance, Food and Nutrient Intake  Physical Signs/Symptoms Outcomes: Biochemical Data, Chewing or Swallowing, GI Status, Fluid Status or Edema, Nutrition Focused Physical Findings, Skin, Weight    Discharge Planning:    Too soon to determine     Lizzie Ngo, MICK, LD  Contact: [phone]      
Cancer Father         brain    Depression Father     Heart Disease Father     High Cholesterol Father     Mental Illness Father     Cancer Sister     Heart Attack Sister     Heart Disease Maternal Uncle     High Cholesterol Maternal Uncle     Diabetes Maternal Grandmother     Heart Disease Maternal Grandmother     High Cholesterol Maternal Grandmother     Early Death Maternal Grandfather     Heart Disease Maternal Grandfather     High Cholesterol Maternal Grandfather     Stroke Maternal Grandfather     Heart Disease Paternal Grandmother     High Cholesterol Paternal Grandmother     Heart Disease Paternal Grandfather     High Cholesterol Paternal Grandfather     Colon Cancer Neg Hx     Inflam Bowel Dis Neg Hx      No Known Allergies      Constitutional: Alert and oriented times x3, no acute distress, and cooperative to examination with appropriate mood and affect.   HEENT:   Head:               Normocephalic and atraumatic.   Mouth/Throat:                Mucous membranes are normal.   Eyes:               EOM are normal. No scleral icterus.  Nose:               The external appearance of the nose is normal  Ears:                The ears appear normal to external inspection.   Cardiovascular:       Normal rate, regular rhythm.  Pulmonary/Chest:  Normal respiratory rate and rhthym. No use of accessory muscles.   Abdominal:               Soft. L abd tenderness improved Active bowel sounds.  L drain in place             Genitalia:               Voiding spontaneously, mild dysuria  Musculoskeletal:               Normal range of motion. He exhibits no edema or tenderness of lower extremities.   Extremities:               No cyanosis, clubbing, or edema present.  Neurological:               Alert and oriented.     Labs:  WBC:    Lab Results   Component Value Date/Time    WBC 6.5 01/13/2024 04:15 AM     Hemoglobin/Hematocrit:    Lab Results   Component Value Date/Time    HGB 10.4 01/13/2024 04:15 AM    HCT 33.5 01/13/2024 
  Component Value Date/Time     01/15/2024 06:45 AM    K 4.1 01/15/2024 06:45 AM    K 4.4 12/01/2023 04:40 AM     01/15/2024 06:45 AM    CO2 26 01/15/2024 06:45 AM    BUN 14 01/15/2024 06:45 AM    LABALBU 3.5 01/09/2024 09:30 PM    LABALBU 4.3 04/19/2012 10:25 AM    CREATININE 0.6 01/15/2024 06:45 AM    CALCIUM 10.2 01/15/2024 06:45 AM    GFRAA >60 02/25/2019 08:53 PM    LABGLOM >60 01/15/2024 06:45 AM       BA Tompkins - CNP, APRN  01/15/24 12:33 PM  Urology     
AM     BMP:    Lab Results   Component Value Date/Time     01/11/2024 05:12 AM    K 4.1 01/11/2024 05:12 AM    K 4.4 12/01/2023 04:40 AM    CL 98 01/11/2024 05:12 AM    CO2 25 01/11/2024 05:12 AM    BUN 10 01/11/2024 05:12 AM    LABALBU 3.5 01/09/2024 09:30 PM    LABALBU 4.3 04/19/2012 10:25 AM    CREATININE 0.7 01/11/2024 05:12 AM    CALCIUM 10.3 01/11/2024 05:12 AM    GFRAA >60 02/25/2019 08:53 PM    LABGLOM >60 01/11/2024 05:12 AM     BA Tompkins - CNP, BA  01/11/24 9:08 AM  Urology     
technology.**         Final report electronically signed by Dr Anshul Pablo on 1/11/2024 11:42 AM      CT ABDOMEN PELVIS W IV CONTRAST Additional Contrast? None   Final Result      4.3 x 2.4 cm posterior subcapsular collection of the left kidney. This    probably communicates with a 5.8 x 2.7 cm left retrorenal fluid    collection. The findings are grossly unchanged and compatible with the    history of abscess.      Nonobstructive left renal stones measuring up to 8 x 4 mm.      Multiple left para-aortic lymph nodes measuring less than 1 cm in short    axis, likely reactive.      Sigmoid diverticulum. There is no acute diverticulitis.      Small hiatal hernia.      Borderline cardiomegaly.      This document has been electronically signed by: Harry Hurst MD on    01/10/2024 12:46 AM      All CTs at this facility use dose modulation techniques and iterative    reconstructions, and/or weight-based dosing   when appropriate to reduce radiation to a low as reasonably achievable.      CT HEAD WO CONTRAST   Final Result      No acute intracranial abnormality.      Atrophy and chronic microvascular ischemia.      This document has been electronically signed by: Harry Hurst MD on    01/09/2024 11:31 PM      All CTs at this facility use dose modulation techniques and iterative    reconstructions, and/or weight-based dosing   when appropriate to reduce radiation to a low as reasonably achievable.      XR CHEST PORTABLE   Final Result      Mild pulmonary vascular congestion.      Cardiomegaly.      This document has been electronically signed by: Harry Hurst MD on    01/10/2024 12:54 AM        Echo (TTE) complete (PRN contrast/bubble/strain/3D)    Result Date: 1/10/2024    Left Ventricle: Normal left ventricular systolic function with a visually estimated EF of 60 - 65%. Left ventricle size is normal. Increased wall thickness. Septal thickening. Normal wall motion. Normal diastolic function.   Aortic Valve: Not well 
cholecystectomy. The spleen is normal in size with a punctate calcifications suggesting old granulomatous disease. The pancreas and both adrenal glands are unremarkable. Both kidneys are normal in size without hydronephrosis. There are left renal stones measuring up to 8 x 4 mm. There is a 4.3 x 2.4 cm posterior subcapsular collection of the left kidney. This probably communicates with a 5.8 x 2.7 cm left retrorenal fluid collection. There is no hydronephrosis. The urinary bladder is unremarkable. The patient is post hysterectomy. There is no evidence for bowel obstruction. A normal appendix is seen. There is a sigmoid diverticulum. There is a small hiatal hernia. The abdominal aorta is normal in caliber. There are atherosclerotic calcifications. There are multiple left para-aortic lymph nodes measuring less than 1 cm in short axis. The partially visualized lower thorax shows bilateral dependent atelectasis and borderline cardiomegaly.     4.3 x 2.4 cm posterior subcapsular collection of the left kidney. This probably communicates with a 5.8 x 2.7 cm left retrorenal fluid collection. The findings are grossly unchanged and compatible with the history of abscess. Nonobstructive left renal stones measuring up to 8 x 4 mm. Multiple left para-aortic lymph nodes measuring less than 1 cm in short axis, likely reactive. Sigmoid diverticulum. There is no acute diverticulitis. Small hiatal hernia. Borderline cardiomegaly. This document has been electronically signed by: Harry Hurst MD on 01/10/2024 12:46 AM All CTs at this facility use dose modulation techniques and iterative reconstructions, and/or weight-based dosing when appropriate to reduce radiation to a low as reasonably achievable.    CT HEAD WO CONTRAST    Result Date: 1/9/2024  CT head without contrast COMPARISON: 09/28/2022 FINDINGS: No infarct or bleed is seen. There is atrophy and chronic microvascular ischemia. There is no hydrocephalus or midline shift. The

## 2024-01-18 NOTE — DISCHARGE INSTRUCTIONS
Follow-up with PCP outpatient.  CT abdomen/pelvis to be performed prior to follow-up with urology.  Follow-up with urology in 4-6 weeks.   Start Augmentin for perinephritic abscess for 2 weeks.

## 2024-01-18 NOTE — PLAN OF CARE
Problem: Pain  Goal: Verbalizes/displays adequate comfort level or baseline comfort level  Outcome: Progressing  Flowsheets (Taken 1/17/2024 2343)  Verbalizes/displays adequate comfort level or baseline comfort level:   Encourage patient to monitor pain and request assistance   Administer analgesics based on type and severity of pain and evaluate response   Consider cultural and social influences on pain and pain management   Assess pain using appropriate pain scale   Implement non-pharmacological measures as appropriate and evaluate response  Note: Ongoing assessment & interventions provided throughout shift.  Reminded patient to report any pain, pressure, or shortness of breath to the nurse.  Pain medications provided per physician's orders.      Problem: Safety - Adult  Goal: Free from fall injury  Outcome: Progressing  Flowsheets (Taken 1/17/2024 2343)  Free From Fall Injury: Instruct family/caregiver on patient safety  Note: Bed locked & in low position, call light in reach, side-rails up x2, bed/chair alarm utilized, non-slip socks on when ambulating, reminded patient to use call light to call for assistance.      Problem: ABCDS Injury Assessment  Goal: Absence of physical injury  Outcome: Progressing  Flowsheets (Taken 1/17/2024 2343)  Absence of Physical Injury: Implement safety measures based on patient assessment  Note: Bed locked & in low position, call light in reach, side-rails up x2, bed/chair alarm utilized, non-slip socks on when ambulating, reminded patient to use call light to call for assistance.      Problem: Skin/Tissue Integrity  Goal: Absence of new skin breakdown  Description: 1.  Monitor for areas of redness and/or skin breakdown  2.  Assess vascular access sites hourly  3.  Every 4-6 hours minimum:  Change oxygen saturation probe site  4.  Every 4-6 hours:  If on nasal continuous positive airway pressure, respiratory therapy assess nares and determine need for appliance change or resting

## 2024-01-19 PROBLEM — G93.41 METABOLIC ENCEPHALOPATHY: Status: ACTIVE | Noted: 2024-01-19

## 2024-01-19 PROBLEM — I25.10 CORONARY ARTERY DISEASE INVOLVING NATIVE CORONARY ARTERY OF NATIVE HEART WITHOUT ANGINA PECTORIS: Status: ACTIVE | Noted: 2024-01-19

## 2024-01-19 PROBLEM — D64.9 NORMOCYTIC ANEMIA: Status: ACTIVE | Noted: 2024-01-19

## 2024-01-19 PROBLEM — N30.01 ACUTE CYSTITIS WITH HEMATURIA: Status: ACTIVE | Noted: 2024-01-19

## 2024-01-19 PROBLEM — R53.81 PHYSICAL DECONDITIONING: Status: ACTIVE | Noted: 2024-01-19

## 2024-01-26 ENCOUNTER — HOSPITAL ENCOUNTER (INPATIENT)
Age: 69
LOS: 4 days | Discharge: HOME | DRG: 919 | End: 2024-01-31
Attending: EMERGENCY MEDICINE
Payer: MEDICARE

## 2024-01-26 ENCOUNTER — APPOINTMENT (OUTPATIENT)
Dept: CT IMAGING | Age: 69
DRG: 919 | End: 2024-01-26
Payer: MEDICARE

## 2024-01-26 DIAGNOSIS — G89.4 CHRONIC PAIN SYNDROME: Primary | ICD-10-CM

## 2024-01-26 DIAGNOSIS — N15.1 PERINEPHRIC ABSCESS: ICD-10-CM

## 2024-01-26 LAB
ALBUMIN SERPL BCG-MCNC: 4 G/DL (ref 3.5–5.1)
ALP SERPL-CCNC: 74 U/L (ref 38–126)
ALT SERPL W/O P-5'-P-CCNC: 31 U/L (ref 11–66)
ANION GAP SERPL CALC-SCNC: 11 MEQ/L (ref 8–16)
AST SERPL-CCNC: 23 U/L (ref 5–40)
BACTERIA: ABNORMAL
BILIRUB SERPL-MCNC: 0.2 MG/DL (ref 0.3–1.2)
BILIRUB UR QL STRIP: NEGATIVE
BUN SERPL-MCNC: 11 MG/DL (ref 7–22)
CALCIUM SERPL-MCNC: 10.3 MG/DL (ref 8.5–10.5)
CASTS #/AREA URNS LPF: ABNORMAL /LPF
CASTS #/AREA URNS LPF: ABNORMAL /LPF
CHARACTER UR: CLEAR
CHARCOAL URNS QL MICRO: ABNORMAL
CHLORIDE SERPL-SCNC: 101 MEQ/L (ref 98–111)
CO2 SERPL-SCNC: 26 MEQ/L (ref 23–33)
COLOR UR: YELLOW
CREAT SERPL-MCNC: 0.7 MG/DL (ref 0.4–1.2)
CRYSTALS URNS QL MICRO: ABNORMAL
EPITHELIAL CELLS, UA: ABNORMAL /HPF
GFR SERPL CREATININE-BSD FRML MDRD: > 60 ML/MIN/1.73M2
GLUCOSE SERPL-MCNC: 165 MG/DL (ref 70–108)
GLUCOSE UR QL STRIP.AUTO: NEGATIVE MG/DL
HGB UR QL STRIP.AUTO: NEGATIVE
KETONES UR QL STRIP.AUTO: NEGATIVE
LACTIC ACID, SEPSIS: 1.4 MMOL/L (ref 0.5–1.9)
LEUKOCYTE ESTERASE UR QL STRIP.AUTO: ABNORMAL
NITRITE UR QL STRIP.AUTO: NEGATIVE
OSMOLALITY SERPL CALC.SUM OF ELEC: 278.8 MOSMOL/KG (ref 275–300)
PH UR STRIP.AUTO: 6.5 [PH] (ref 5–9)
POTASSIUM SERPL-SCNC: 4 MEQ/L (ref 3.5–5.2)
PROT SERPL-MCNC: 7.4 G/DL (ref 6.1–8)
PROT UR STRIP.AUTO-MCNC: NEGATIVE MG/DL
RBC #/AREA URNS HPF: ABNORMAL /HPF
RENAL EPI CELLS #/AREA URNS HPF: ABNORMAL /[HPF]
SODIUM SERPL-SCNC: 138 MEQ/L (ref 135–145)
SP GR UR REFRACT.AUTO: 1.01 (ref 1–1.03)
UROBILINOGEN UR QL STRIP.AUTO: 1 EU/DL (ref 0–1)
WBC #/AREA URNS HPF: ABNORMAL /HPF
YEAST LIKE FUNGI URNS QL MICRO: ABNORMAL

## 2024-01-26 PROCEDURE — 96374 THER/PROPH/DIAG INJ IV PUSH: CPT

## 2024-01-26 PROCEDURE — 6360000004 HC RX CONTRAST MEDICATION: Performed by: EMERGENCY MEDICINE

## 2024-01-26 PROCEDURE — 81001 URINALYSIS AUTO W/SCOPE: CPT

## 2024-01-26 PROCEDURE — 87040 BLOOD CULTURE FOR BACTERIA: CPT

## 2024-01-26 PROCEDURE — 99285 EMERGENCY DEPT VISIT HI MDM: CPT

## 2024-01-26 PROCEDURE — 96375 TX/PRO/DX INJ NEW DRUG ADDON: CPT

## 2024-01-26 PROCEDURE — 80053 COMPREHEN METABOLIC PANEL: CPT

## 2024-01-26 PROCEDURE — 96376 TX/PRO/DX INJ SAME DRUG ADON: CPT

## 2024-01-26 PROCEDURE — 36415 COLL VENOUS BLD VENIPUNCTURE: CPT

## 2024-01-26 PROCEDURE — 74177 CT ABD & PELVIS W/CONTRAST: CPT

## 2024-01-26 PROCEDURE — 83605 ASSAY OF LACTIC ACID: CPT

## 2024-01-26 PROCEDURE — 85025 COMPLETE CBC W/AUTO DIFF WBC: CPT

## 2024-01-26 PROCEDURE — 6360000002 HC RX W HCPCS: Performed by: EMERGENCY MEDICINE

## 2024-01-26 RX ORDER — ONDANSETRON 2 MG/ML
4 INJECTION INTRAMUSCULAR; INTRAVENOUS ONCE
Status: COMPLETED | OUTPATIENT
Start: 2024-01-26 | End: 2024-01-26

## 2024-01-26 RX ORDER — MORPHINE SULFATE 4 MG/ML
4 INJECTION, SOLUTION INTRAMUSCULAR; INTRAVENOUS ONCE
Status: COMPLETED | OUTPATIENT
Start: 2024-01-26 | End: 2024-01-26

## 2024-01-26 RX ADMIN — IOPAMIDOL 80 ML: 755 INJECTION, SOLUTION INTRAVENOUS at 23:10

## 2024-01-26 RX ADMIN — MORPHINE SULFATE 4 MG: 4 INJECTION, SOLUTION INTRAMUSCULAR; INTRAVENOUS at 22:09

## 2024-01-26 RX ADMIN — ONDANSETRON 4 MG: 2 INJECTION INTRAMUSCULAR; INTRAVENOUS at 22:09

## 2024-01-26 ASSESSMENT — PAIN DESCRIPTION - ORIENTATION: ORIENTATION: LEFT;LOWER;OUTER

## 2024-01-26 ASSESSMENT — PAIN - FUNCTIONAL ASSESSMENT: PAIN_FUNCTIONAL_ASSESSMENT: 0-10

## 2024-01-26 ASSESSMENT — PAIN DESCRIPTION - LOCATION: LOCATION: ABDOMEN

## 2024-01-26 ASSESSMENT — PAIN SCALES - GENERAL: PAINLEVEL_OUTOF10: 10

## 2024-01-27 LAB
ANION GAP SERPL CALC-SCNC: 8 MEQ/L (ref 8–16)
BASOPHILS ABSOLUTE: 0.1 THOU/MM3 (ref 0–0.1)
BASOPHILS NFR BLD AUTO: 0.8 %
BUN SERPL-MCNC: 11 MG/DL (ref 7–22)
CALCIUM SERPL-MCNC: 10.4 MG/DL (ref 8.5–10.5)
CHLORIDE SERPL-SCNC: 100 MEQ/L (ref 98–111)
CO2 SERPL-SCNC: 30 MEQ/L (ref 23–33)
CREAT SERPL-MCNC: 0.7 MG/DL (ref 0.4–1.2)
DEPRECATED RDW RBC AUTO: 49.2 FL (ref 35–45)
DEPRECATED RDW RBC AUTO: 49.4 FL (ref 35–45)
EOSINOPHIL NFR BLD AUTO: 1.4 %
EOSINOPHILS ABSOLUTE: 0.1 THOU/MM3 (ref 0–0.4)
ERYTHROCYTE [DISTWIDTH] IN BLOOD BY AUTOMATED COUNT: 15.8 % (ref 11.5–14.5)
ERYTHROCYTE [DISTWIDTH] IN BLOOD BY AUTOMATED COUNT: 15.9 % (ref 11.5–14.5)
GFR SERPL CREATININE-BSD FRML MDRD: > 60 ML/MIN/1.73M2
GLUCOSE SERPL-MCNC: 133 MG/DL (ref 70–108)
HCT VFR BLD AUTO: 37.7 % (ref 37–47)
HCT VFR BLD AUTO: 39.1 % (ref 37–47)
HGB BLD-MCNC: 11.9 GM/DL (ref 12–16)
HGB BLD-MCNC: 12.5 GM/DL (ref 12–16)
IMM GRANULOCYTES # BLD AUTO: 0.05 THOU/MM3 (ref 0–0.07)
IMM GRANULOCYTES NFR BLD AUTO: 0.7 %
LYMPHOCYTES ABSOLUTE: 3 THOU/MM3 (ref 1–4.8)
LYMPHOCYTES NFR BLD AUTO: 42 %
MAGNESIUM SERPL-MCNC: 1.6 MG/DL (ref 1.6–2.4)
MCH RBC QN AUTO: 27.7 PG (ref 26–33)
MCH RBC QN AUTO: 28 PG (ref 26–33)
MCHC RBC AUTO-ENTMCNC: 31.6 GM/DL (ref 32.2–35.5)
MCHC RBC AUTO-ENTMCNC: 32 GM/DL (ref 32.2–35.5)
MCV RBC AUTO: 87.5 FL (ref 81–99)
MCV RBC AUTO: 87.7 FL (ref 81–99)
MONOCYTES ABSOLUTE: 0.6 THOU/MM3 (ref 0.4–1.3)
MONOCYTES NFR BLD AUTO: 7.9 %
NEUTROPHILS NFR BLD AUTO: 47.2 %
NRBC BLD AUTO-RTO: 0 /100 WBC
OSMOLALITY SERPL CALC.SUM OF ELEC: 277 MOSMOL/KG (ref 275–300)
PLATELET # BLD AUTO: 281 THOU/MM3 (ref 130–400)
PLATELET # BLD AUTO: 311 THOU/MM3 (ref 130–400)
PLATELET BLD QL SMEAR: ADEQUATE
PMV BLD AUTO: 9.1 FL (ref 9.4–12.4)
PMV BLD AUTO: 9.3 FL (ref 9.4–12.4)
POTASSIUM SERPL-SCNC: 4.3 MEQ/L (ref 3.5–5.2)
RBC # BLD AUTO: 4.3 MILL/MM3 (ref 4.2–5.4)
RBC # BLD AUTO: 4.47 MILL/MM3 (ref 4.2–5.4)
SCAN OF BLOOD SMEAR: NORMAL
SEGMENTED NEUTROPHILS ABSOLUTE COUNT: 3.4 THOU/MM3 (ref 1.8–7.7)
SODIUM SERPL-SCNC: 138 MEQ/L (ref 135–145)
VARIANT LYMPHS BLD QL SMEAR: ABNORMAL %
WBC # BLD AUTO: 7.2 THOU/MM3 (ref 4.8–10.8)
WBC # BLD AUTO: 7.9 THOU/MM3 (ref 4.8–10.8)

## 2024-01-27 PROCEDURE — 6370000000 HC RX 637 (ALT 250 FOR IP)

## 2024-01-27 PROCEDURE — 6360000002 HC RX W HCPCS: Performed by: EMERGENCY MEDICINE

## 2024-01-27 PROCEDURE — 99221 1ST HOSP IP/OBS SF/LOW 40: CPT | Performed by: UROLOGY

## 2024-01-27 PROCEDURE — 80048 BASIC METABOLIC PNL TOTAL CA: CPT

## 2024-01-27 PROCEDURE — 36415 COLL VENOUS BLD VENIPUNCTURE: CPT

## 2024-01-27 PROCEDURE — 85027 COMPLETE CBC AUTOMATED: CPT

## 2024-01-27 PROCEDURE — 6360000002 HC RX W HCPCS

## 2024-01-27 PROCEDURE — 99223 1ST HOSP IP/OBS HIGH 75: CPT | Performed by: HOSPITALIST

## 2024-01-27 PROCEDURE — 2580000003 HC RX 258

## 2024-01-27 PROCEDURE — 83735 ASSAY OF MAGNESIUM: CPT

## 2024-01-27 PROCEDURE — 1200000003 HC TELEMETRY R&B

## 2024-01-27 RX ORDER — SODIUM CHLORIDE, SODIUM LACTATE, POTASSIUM CHLORIDE, CALCIUM CHLORIDE 600; 310; 30; 20 MG/100ML; MG/100ML; MG/100ML; MG/100ML
INJECTION, SOLUTION INTRAVENOUS CONTINUOUS
Status: ACTIVE | OUTPATIENT
Start: 2024-01-27 | End: 2024-01-27

## 2024-01-27 RX ORDER — FERROUS SULFATE 325(65) MG
325 TABLET ORAL 2 TIMES DAILY
Status: DISCONTINUED | OUTPATIENT
Start: 2024-01-27 | End: 2024-01-31 | Stop reason: HOSPADM

## 2024-01-27 RX ORDER — FOLIC ACID 1 MG/1
1 TABLET ORAL DAILY
Status: DISCONTINUED | OUTPATIENT
Start: 2024-01-27 | End: 2024-01-31 | Stop reason: HOSPADM

## 2024-01-27 RX ORDER — LANOLIN ALCOHOL/MO/W.PET/CERES
50 CREAM (GRAM) TOPICAL DAILY
Status: DISCONTINUED | OUTPATIENT
Start: 2024-01-27 | End: 2024-01-31 | Stop reason: HOSPADM

## 2024-01-27 RX ORDER — HYDROCODONE BITARTRATE AND ACETAMINOPHEN 5; 325 MG/1; MG/1
2 TABLET ORAL EVERY 4 HOURS PRN
Status: DISCONTINUED | OUTPATIENT
Start: 2024-01-27 | End: 2024-01-31 | Stop reason: HOSPADM

## 2024-01-27 RX ORDER — POLYETHYLENE GLYCOL 3350 17 G/17G
17 POWDER, FOR SOLUTION ORAL DAILY PRN
Status: DISCONTINUED | OUTPATIENT
Start: 2024-01-27 | End: 2024-01-31 | Stop reason: HOSPADM

## 2024-01-27 RX ORDER — MAGNESIUM SULFATE IN WATER 40 MG/ML
2000 INJECTION, SOLUTION INTRAVENOUS PRN
Status: DISCONTINUED | OUTPATIENT
Start: 2024-01-27 | End: 2024-01-31 | Stop reason: HOSPADM

## 2024-01-27 RX ORDER — ONDANSETRON 4 MG/1
4 TABLET, ORALLY DISINTEGRATING ORAL EVERY 8 HOURS PRN
Status: DISCONTINUED | OUTPATIENT
Start: 2024-01-27 | End: 2024-01-31 | Stop reason: HOSPADM

## 2024-01-27 RX ORDER — ENOXAPARIN SODIUM 100 MG/ML
40 INJECTION SUBCUTANEOUS DAILY
Status: DISCONTINUED | OUTPATIENT
Start: 2024-01-27 | End: 2024-01-31 | Stop reason: HOSPADM

## 2024-01-27 RX ORDER — ONDANSETRON 2 MG/ML
4 INJECTION INTRAMUSCULAR; INTRAVENOUS EVERY 6 HOURS PRN
Status: DISCONTINUED | OUTPATIENT
Start: 2024-01-27 | End: 2024-01-31 | Stop reason: HOSPADM

## 2024-01-27 RX ORDER — MAGNESIUM HYDROXIDE 1200 MG/15ML
100 LIQUID ORAL PRN
Status: DISCONTINUED | OUTPATIENT
Start: 2024-01-27 | End: 2024-01-27

## 2024-01-27 RX ORDER — CARVEDILOL 3.12 MG/1
3.12 TABLET ORAL 2 TIMES DAILY
Status: DISCONTINUED | OUTPATIENT
Start: 2024-01-27 | End: 2024-01-31 | Stop reason: HOSPADM

## 2024-01-27 RX ORDER — FLUTICASONE PROPIONATE 50 MCG
1 SPRAY, SUSPENSION (ML) NASAL 2 TIMES DAILY
Status: DISCONTINUED | OUTPATIENT
Start: 2024-01-27 | End: 2024-01-31 | Stop reason: HOSPADM

## 2024-01-27 RX ORDER — LIDOCAINE 4 G/G
1 PATCH TOPICAL DAILY
Status: DISCONTINUED | OUTPATIENT
Start: 2024-01-27 | End: 2024-01-31 | Stop reason: HOSPADM

## 2024-01-27 RX ORDER — ATORVASTATIN CALCIUM 40 MG/1
40 TABLET, FILM COATED ORAL NIGHTLY
Status: DISCONTINUED | OUTPATIENT
Start: 2024-01-27 | End: 2024-01-31 | Stop reason: HOSPADM

## 2024-01-27 RX ORDER — POTASSIUM CHLORIDE 7.45 MG/ML
10 INJECTION INTRAVENOUS PRN
Status: DISCONTINUED | OUTPATIENT
Start: 2024-01-27 | End: 2024-01-31 | Stop reason: HOSPADM

## 2024-01-27 RX ORDER — HYDROCODONE BITARTRATE AND ACETAMINOPHEN 5; 325 MG/1; MG/1
1 TABLET ORAL EVERY 4 HOURS PRN
Status: DISCONTINUED | OUTPATIENT
Start: 2024-01-27 | End: 2024-01-31 | Stop reason: HOSPADM

## 2024-01-27 RX ORDER — ALBUTEROL SULFATE 2.5 MG/3ML
2.5 SOLUTION RESPIRATORY (INHALATION) EVERY 6 HOURS PRN
Status: DISCONTINUED | OUTPATIENT
Start: 2024-01-27 | End: 2024-01-31 | Stop reason: HOSPADM

## 2024-01-27 RX ORDER — SODIUM CHLORIDE 0.9 % (FLUSH) 0.9 %
5-40 SYRINGE (ML) INJECTION EVERY 12 HOURS SCHEDULED
Status: DISCONTINUED | OUTPATIENT
Start: 2024-01-27 | End: 2024-01-31 | Stop reason: HOSPADM

## 2024-01-27 RX ORDER — VITAMIN B COMPLEX
2000 TABLET ORAL DAILY
Status: DISCONTINUED | OUTPATIENT
Start: 2024-01-27 | End: 2024-01-31 | Stop reason: HOSPADM

## 2024-01-27 RX ORDER — BUPROPION HYDROCHLORIDE 300 MG/1
300 TABLET ORAL EVERY MORNING
Status: DISCONTINUED | OUTPATIENT
Start: 2024-01-27 | End: 2024-01-31 | Stop reason: HOSPADM

## 2024-01-27 RX ORDER — SODIUM CHLORIDE 0.9 % (FLUSH) 0.9 %
10 SYRINGE (ML) INJECTION PRN
Status: DISCONTINUED | OUTPATIENT
Start: 2024-01-27 | End: 2024-01-31 | Stop reason: HOSPADM

## 2024-01-27 RX ORDER — TRAZODONE HYDROCHLORIDE 100 MG/1
200 TABLET ORAL NIGHTLY
Status: DISCONTINUED | OUTPATIENT
Start: 2024-01-27 | End: 2024-01-31 | Stop reason: HOSPADM

## 2024-01-27 RX ORDER — TAMSULOSIN HYDROCHLORIDE 0.4 MG/1
0.4 CAPSULE ORAL DAILY
Status: DISCONTINUED | OUTPATIENT
Start: 2024-01-27 | End: 2024-01-27

## 2024-01-27 RX ORDER — ACETAMINOPHEN 650 MG/1
650 SUPPOSITORY RECTAL EVERY 6 HOURS PRN
Status: DISCONTINUED | OUTPATIENT
Start: 2024-01-27 | End: 2024-01-31 | Stop reason: HOSPADM

## 2024-01-27 RX ORDER — FENOFIBRATE 160 MG/1
160 TABLET ORAL DAILY
Status: DISCONTINUED | OUTPATIENT
Start: 2024-01-27 | End: 2024-01-31 | Stop reason: HOSPADM

## 2024-01-27 RX ORDER — LEVOTHYROXINE SODIUM 0.07 MG/1
75 TABLET ORAL
Status: DISCONTINUED | OUTPATIENT
Start: 2024-01-27 | End: 2024-01-31 | Stop reason: HOSPADM

## 2024-01-27 RX ORDER — MORPHINE SULFATE 4 MG/ML
4 INJECTION, SOLUTION INTRAMUSCULAR; INTRAVENOUS ONCE
Status: COMPLETED | OUTPATIENT
Start: 2024-01-27 | End: 2024-01-27

## 2024-01-27 RX ORDER — ACETAMINOPHEN 325 MG/1
650 TABLET ORAL EVERY 6 HOURS PRN
Status: DISCONTINUED | OUTPATIENT
Start: 2024-01-27 | End: 2024-01-31 | Stop reason: HOSPADM

## 2024-01-27 RX ORDER — PANTOPRAZOLE SODIUM 40 MG/1
40 TABLET, DELAYED RELEASE ORAL
Status: DISCONTINUED | OUTPATIENT
Start: 2024-01-27 | End: 2024-01-31 | Stop reason: HOSPADM

## 2024-01-27 RX ORDER — SODIUM CHLORIDE 9 MG/ML
INJECTION, SOLUTION INTRAVENOUS PRN
Status: DISCONTINUED | OUTPATIENT
Start: 2024-01-27 | End: 2024-01-31 | Stop reason: HOSPADM

## 2024-01-27 RX ORDER — POTASSIUM CHLORIDE 20 MEQ/1
40 TABLET, EXTENDED RELEASE ORAL PRN
Status: DISCONTINUED | OUTPATIENT
Start: 2024-01-27 | End: 2024-01-31 | Stop reason: HOSPADM

## 2024-01-27 RX ORDER — ASPIRIN 81 MG/1
81 TABLET ORAL DAILY
Status: DISCONTINUED | OUTPATIENT
Start: 2024-01-27 | End: 2024-01-31 | Stop reason: HOSPADM

## 2024-01-27 RX ORDER — ESCITALOPRAM OXALATE 20 MG/1
20 TABLET ORAL DAILY
Status: DISCONTINUED | OUTPATIENT
Start: 2024-01-27 | End: 2024-01-31 | Stop reason: HOSPADM

## 2024-01-27 RX ADMIN — HYDROCODONE BITARTRATE AND ACETAMINOPHEN 1 TABLET: 5; 325 TABLET ORAL at 22:24

## 2024-01-27 RX ADMIN — ENOXAPARIN SODIUM 40 MG: 100 INJECTION SUBCUTANEOUS at 08:48

## 2024-01-27 RX ADMIN — ASPIRIN 81 MG: 81 TABLET, COATED ORAL at 08:47

## 2024-01-27 RX ADMIN — FERROUS SULFATE TAB 325 MG (65 MG ELEMENTAL FE) 325 MG: 325 (65 FE) TAB at 08:48

## 2024-01-27 RX ADMIN — CARVEDILOL 3.12 MG: 3.12 TABLET, FILM COATED ORAL at 08:47

## 2024-01-27 RX ADMIN — QUETIAPINE FUMARATE 600 MG: 400 TABLET ORAL at 21:07

## 2024-01-27 RX ADMIN — FERROUS SULFATE TAB 325 MG (65 MG ELEMENTAL FE) 325 MG: 325 (65 FE) TAB at 21:06

## 2024-01-27 RX ADMIN — PANTOPRAZOLE SODIUM 40 MG: 40 TABLET, DELAYED RELEASE ORAL at 06:31

## 2024-01-27 RX ADMIN — FOLIC ACID 1 MG: 1 TABLET ORAL at 08:47

## 2024-01-27 RX ADMIN — Medication 50 MG: at 08:48

## 2024-01-27 RX ADMIN — CARVEDILOL 3.12 MG: 3.12 TABLET, FILM COATED ORAL at 21:07

## 2024-01-27 RX ADMIN — MORPHINE SULFATE 4 MG: 4 INJECTION, SOLUTION INTRAMUSCULAR; INTRAVENOUS at 00:26

## 2024-01-27 RX ADMIN — PANTOPRAZOLE SODIUM 40 MG: 40 TABLET, DELAYED RELEASE ORAL at 16:07

## 2024-01-27 RX ADMIN — BUPROPION HYDROCHLORIDE 300 MG: 300 TABLET, FILM COATED, EXTENDED RELEASE ORAL at 08:47

## 2024-01-27 RX ADMIN — SODIUM CHLORIDE, POTASSIUM CHLORIDE, SODIUM LACTATE AND CALCIUM CHLORIDE: 600; 310; 30; 20 INJECTION, SOLUTION INTRAVENOUS at 05:02

## 2024-01-27 RX ADMIN — HYDROCODONE BITARTRATE AND ACETAMINOPHEN 1 TABLET: 5; 325 TABLET ORAL at 18:42

## 2024-01-27 RX ADMIN — DICLOFENAC SODIUM 4 G: 10 GEL TOPICAL at 21:08

## 2024-01-27 RX ADMIN — ATORVASTATIN CALCIUM 40 MG: 40 TABLET, FILM COATED ORAL at 21:06

## 2024-01-27 RX ADMIN — Medication 2000 UNITS: at 08:48

## 2024-01-27 RX ADMIN — FENOFIBRATE 160 MG: 160 TABLET ORAL at 08:48

## 2024-01-27 RX ADMIN — CEFTRIAXONE SODIUM 1000 MG: 1 INJECTION, POWDER, FOR SOLUTION INTRAMUSCULAR; INTRAVENOUS at 13:54

## 2024-01-27 RX ADMIN — SODIUM CHLORIDE, PRESERVATIVE FREE 10 ML: 5 INJECTION INTRAVENOUS at 08:47

## 2024-01-27 RX ADMIN — ESCITALOPRAM OXALATE 20 MG: 20 TABLET ORAL at 08:48

## 2024-01-27 RX ADMIN — LEVOTHYROXINE SODIUM 75 MCG: 0.07 TABLET ORAL at 08:47

## 2024-01-27 RX ADMIN — DICLOFENAC SODIUM 4 G: 10 GEL TOPICAL at 08:48

## 2024-01-27 RX ADMIN — TRAZODONE HYDROCHLORIDE 200 MG: 100 TABLET ORAL at 21:06

## 2024-01-27 RX ADMIN — HYDROCODONE BITARTRATE AND ACETAMINOPHEN 1 TABLET: 5; 325 TABLET ORAL at 13:48

## 2024-01-27 ASSESSMENT — PAIN DESCRIPTION - LOCATION
LOCATION: FLANK
LOCATION: BACK
LOCATION: FLANK
LOCATION: HIP
LOCATION: ABDOMEN

## 2024-01-27 ASSESSMENT — PAIN DESCRIPTION - FREQUENCY
FREQUENCY: CONTINUOUS
FREQUENCY: INTERMITTENT
FREQUENCY: INTERMITTENT

## 2024-01-27 ASSESSMENT — PAIN SCALES - GENERAL
PAINLEVEL_OUTOF10: 2
PAINLEVEL_OUTOF10: 6
PAINLEVEL_OUTOF10: 2
PAINLEVEL_OUTOF10: 5
PAINLEVEL_OUTOF10: 7
PAINLEVEL_OUTOF10: 8
PAINLEVEL_OUTOF10: 3
PAINLEVEL_OUTOF10: 6

## 2024-01-27 ASSESSMENT — PAIN DESCRIPTION - PAIN TYPE
TYPE: ACUTE PAIN

## 2024-01-27 ASSESSMENT — PAIN - FUNCTIONAL ASSESSMENT
PAIN_FUNCTIONAL_ASSESSMENT: ACTIVITIES ARE NOT PREVENTED
PAIN_FUNCTIONAL_ASSESSMENT: PREVENTS OR INTERFERES SOME ACTIVE ACTIVITIES AND ADLS
PAIN_FUNCTIONAL_ASSESSMENT: ACTIVITIES ARE NOT PREVENTED

## 2024-01-27 ASSESSMENT — PAIN DESCRIPTION - ORIENTATION
ORIENTATION: LEFT
ORIENTATION: LEFT;LOWER
ORIENTATION: LEFT

## 2024-01-27 ASSESSMENT — PAIN DESCRIPTION - DESCRIPTORS
DESCRIPTORS: SHARP
DESCRIPTORS: ACHING;DISCOMFORT
DESCRIPTORS: ACHING
DESCRIPTORS: ACHING;DISCOMFORT
DESCRIPTORS: ACHING;DISCOMFORT

## 2024-01-27 ASSESSMENT — PAIN DESCRIPTION - ONSET
ONSET: ON-GOING

## 2024-01-27 NOTE — CONSULTS
[facility]  [facility]  [address] [address]  Dept: [phone]  Loc: [phone]  Visit Date: 1/26/2024    Urology Consult Note    Reason for Consult:  Discussed with Dr. Mancini to admit for pain management and have reassessment by urology   Requesting Physician:  medicine    History Obtained From:  patient, electronic medical record    Chief Complaint: abd pain    HISTORY OF PRESENT ILLNESS:                The patient is a 68 y.o. female with significant past medical history of Hx of left-sided perinephric s/p drainage, hypothyroidism, anemia, COPD, HTN, HLD who presented to [facility] on 4/9/2024 for evaluation of AMS from nursing home.  Patient was recently hospitalized from 11/30 to 12/5/2023; she had similar complaints of left-sided abdominal tenderness and was found to have left-sided perinephric abscess which was drained patient was sent home on antibiotic course.  At nursing home patient had abdominal tenderness; and patient was brought in for further evaluation.  Was admitted for pain control    OV 1/4/24   1. OAB (overactive bladder)  2. Stress incontinence in female  3. Urinary frequency  - Hx of botox, mild stress incontinence  - Referral for PFPT in past  - LUTS not bothersome at this time.      4. Perinephric hematoma  - Reviewed CT, hematoma decreasing size.   - Pigtail drain removed today in office without difficulty.   - Follow-up 6 months with CT a/p wo contrast     Previously admitted last week, drain was reinserted    Past Medical History:        Diagnosis Date    Allergic rhinitis     Arthritis     back, arms, hips    Bipolar 1 disorder (HCC)     Cancer ([facility]) 2011    cancerous polyps removed - Dr. Silva    Cataract     Colon polyps     COPD (chronic obstructive pulmonary disease) ([facility]) 07/24/2014    Coronary vasospasm ([facility]) 08/17/2019    Depression     Diverticulitis of colon     GERD (gastroesophageal reflux disease)     Glaucoma      01/27/2024 03:44 AM    CALCIUM 10.4 01/27/2024 03:44 AM    GFRAA >60 02/25/2019 08:53 PM    LABGLOM >60 01/27/2024 03:44 AM    GLUCOSE 133 01/27/2024 03:44 AM    GLUCOSE 155 12/12/2019 02:54 PM     BUN/Creatinine:    Lab Results   Component Value Date/Time    BUN 11 01/27/2024 03:44 AM    CREATININE 0.7 01/27/2024 03:44 AM     Magnesium:    Lab Results   Component Value Date/Time    MG 1.6 01/27/2024 03:44 AM     Phosphorus:    Lab Results   Component Value Date/Time    PHOS 3.2 11/17/2017 08:01 AM     PT/INR:    Lab Results   Component Value Date/Time    PROTIME 14.6 12/12/2019 02:54 PM    INR 1.56 10/29/2023 08:32 PM     U/A:    Lab Results   Component Value Date/Time    COLORU YELLOW 01/26/2024 10:00 PM    PHUR 6.5 01/26/2024 10:00 PM    LABCAST NONE SEEN 01/26/2024 10:00 PM    LABCAST NONE SEEN 01/26/2024 10:00 PM    WBCUA 2-4 01/26/2024 10:00 PM    WBCUA 0-2 04/19/2012 10:10 AM    RBCUA 0-2 01/26/2024 10:00 PM    MUCUS THREADS 09/12/2023 12:35 PM    YEAST NONE SEEN 01/26/2024 10:00 PM    BACTERIA NONE SEEN 01/26/2024 10:00 PM    CLARITYU  08/11/2020 12:00 AM      Comment:      Hazy    SPECGRAV 1.009 01/26/2024 10:00 PM    LEUKOCYTESUR TRACE 01/26/2024 10:00 PM    LEUKOCYTESUR MODERATE 01/09/2024 09:30 PM    UROBILINOGEN 1.0 01/26/2024 10:00 PM    BILIRUBINUR NEGATIVE 01/26/2024 10:00 PM    BILIRUBINUR NEGATIVE 04/19/2012 10:10 AM    BLOODU NEGATIVE 01/26/2024 10:00 PM    GLUCOSEU NEGATIVE 01/09/2024 09:30 PM    AMORPHOUS DEBRIS 09/12/2023 12:35 PM       Imaging:   The patient has had a CT Without and With Contrast which I have independently reviewed along with its accompanying report.  The study demonstrates   CT Abdomen and Pelvis W Contrast    COMPARISON: CT/KO/SR - CT ABDOMEN PELVIS W IV CONTRAST - 01/17/2024 11:17  AM EST    FINDINGS:  Small hiatal hernia.  Hepatic cyst. Diffusely hypodense liver consistent with hepatic steatosis.  Hepatomegaly.  Normal spleen.  Unremarkable right kidney. Left renal cyst.

## 2024-01-27 NOTE — PLAN OF CARE
Problem: Discharge Planning  Goal: Discharge to home or other facility with appropriate resources  Outcome: Progressing  Flowsheets (Taken 1/27/2024 0355)  Discharge to home or other facility with appropriate resources: Identify barriers to discharge with patient and caregiver     Problem: Pain  Goal: Verbalizes/displays adequate comfort level or baseline comfort level  Outcome: Progressing  Flowsheets (Taken 1/27/2024 0345)  Verbalizes/displays adequate comfort level or baseline comfort level: Encourage patient to monitor pain and request assistance     Problem: Safety - Adult  Goal: Free from fall injury  Outcome: Progressing  Note: Call light within reach.  Side rails up x2.  Bed alarm on. Non skid slippers available.

## 2024-01-27 NOTE — ED NOTES
Pt to ED via [facility] from [facility] c/o pain at drain site (lower left abdomen). Family at bedside states patient has the drain to drain an abscess. Pt states the pain started today. Pt denies N/V. VSS.

## 2024-01-27 NOTE — H&P
Medicine Admission History & Physical      Patient:  Elli Coulter  YOB: 1955  Date of Service: 1/27/2024  MRN: [id number]   Acct: [id number]   Primary Care Physician: Marc De Oliveira MD    Admitting Faculty MD: Misael Olivarez MD    Code Status: Full Code No additional code details    Date of Service: Pt seen/examined in consultation on 1/27/2024     Assessment / Plan     Briefly, pt Elli Coulter is a 68 y.o. female with a PMH of recent hospitalization for L perinephric abscess s/p PCT 01/11/24 who presents with worsening drain site pain and unchanged abscess sizes. Urology requesting admission for pain management and reassessment of abscess.     #L Large Perinephric Abscess s/p PTC 01/11/24: Active  Presenting with worsening drain site pain starting 01/26/24. CTA AP on admit demonstrated a medial L retroperitoneal collection abutting the L psoas muscle measuring 3.0 x 1.5 cm (previously 3.0 x 1.4 cm) and an unchanged L posterolateral subcapsular renal collection measuring 5.6 x 3.0 cm (previously 5.6 x 3.1 cm). No evidence of persistent or new obstructive uropathy. Per ED, Urology would appreciate admission for pain management and will reassess abscess. No s/s of active infection right now as UA is clean and pt is afebrile, non-tachycardic, non-tachypneic, non-diaphoretic, non-leukocytotic.   Plan:   -Urology consulted appreciate recs  -Wound ostomy consulted; appreciate recs  -Antimicrobials - will hold off on initiation of these for now    +PO Augmentin: (01/11/24-01/25/24)    +IV Zosyn 01/10/24-01/11/24)    +Adjust as appropriate  -Pain management; multimodal    +IV dilaudid push panel    +Tylenol    +Diclofenac    +Heating pad    +Will adjust as appropriate  -Will consider pain management consult  -Will consider ID consult    #Chronic HFpEF EF 60-65% 01/09/24, NYHA Class III, 2/2 NICM: Stable. No s/s of exacerbation. Continue carvedilol. Not on diuretic. Optimize GDMT as appropriate.  Unremarkable bladder. Status post hysterectomy. No ascites. No pneumoperitoneum. No lymphadenopathy. No acute fracture. Degenerative changes in the spine. Unchanged curvilinear sclerosis in the femoral heads consistent with osteonecrosis. No abdominal aortic aneurysm.     Similar left renal subcapsular collection and left retroperitoneal collections with left retroperitoneal percutaneous drain in place. Nonemergent/incidental findings above. This document has been electronically signed by: Diomedes Blandon MD on 01/26/2024 11:40 PM All CTs at this facility use dose modulation techniques and iterative reconstructions, and/or weight-based dosing when appropriate to reduce radiation to a low as reasonably achievable.      EKG:   As above    Micro:  No active infections    Signed:  Dr. Hardik Cronin MD   Chief Resident, PGY-3  01/27/24  1:49 AM    Staff: Dr. Misael Olivarez MD

## 2024-01-27 NOTE — PROGRESS NOTES
Pt admitted to  6K13 in a wheelchair.   Complaints: Complaining of pain at the left flank nephrostomy tube site.    IV none infusing into the forearm left, condition patent and no redness at the site. IV site free of s/s of infection or infiltration. Vital signs obtained. Assessment and data collection initiated. Two nurse skin assessment performed by Katya PUTNAM and Suzanne PUTNAM. Oriented to room. Policies and procedures for 6 explained. Katya PUTNAM discussed hourly rounding with patient addressing 5 P's. Fall prevention and safety brochure discussed with patient.  Bed alarm on. Call light in reach.  The best day to schedule a follow up Dr appointment is:  Tuesday a.m.  Explained patients right to have family, representative or physician notified of their admission.  Patient has Declined for physician to be notified.  Patient has Declined for family/representative to be notified. All questions answered with no further questions at this time.

## 2024-01-27 NOTE — PLAN OF CARE
Patient is a 68-year-old female who was recently in the hospital 1/9/2024 to 1/18/2024 due to large left perinephric abscess status post percutaneous CT-guided drain placement with residual 3 x 1.4 cm collection in close proximity of the left psoas and stable 5.6 x 3.1 subcapsular collection of the left kidney noted on 1/17/2024.  At the time of urine cultures were positive for Burkholderia cepacia and patient was discharged with Augmentin for 2 weeks however, wound cultures positive for Proteus sensitive to ceftriaxone.  Started the patient on ceftriaxone at this time.  Spoke with urology plan for IR assessment for possible drain placement into fluid collection noted on CT this admission.

## 2024-01-27 NOTE — ED PROVIDER NOTES
[facility] [place] EMERGENCY DEPT  [address] [address]  Phone: [phone]      CHIEF COMPLAINT       Chief Complaint   Patient presents with    Abdominal Pain       Nurses Notes reviewed and I agree except as noted in the HPI.      HISTORY OF PRESENT ILLNESS    Elli Coulter is a 68 y.o. female.    Patient presents from nursing home with pain in the left flank area.  She was recently admitted to the hospital for what was described as a left perinephric abscess that she has in place a tube that looks like a nephrostomy tube that is going to her drain and there is purulent material in the bulb.  In any case she says pain has been going on for a while in this region however there is a family member in the room who says that she just went to visit her 2 hours ago and  she did not say anything about it at the time.    REVIEW OF SYSTEMS         No fever, no chest pain, no shortness of breath, no abdominal pain    Remainder of review of systems is otherwise reviewed as negative.      PAST MEDICAL HISTORY    has a past medical history of Allergic rhinitis, Arthritis, Bipolar 1 disorder (HCC), Cancer (HCC), Cataract, Colon polyps, COPD (chronic obstructive pulmonary disease) (HCC), Coronary vasospasm (HCC), Depression, Diverticulitis of colon, GERD (gastroesophageal reflux disease), Glaucoma, Hearing loss, Hiatal hernia, History of arterial ischemic stroke, History of colonoscopy, History of kidney stones, Hx of blood clots, Hyperlipidemia, Hypertension, Liver disease, Pneumonia, Seasonal allergies, Sexual problems, Suicidal thoughts, Thyroid disease, Urinary incontinence, Vomiting, Wears dentures, and Wears glasses.    SURGICAL HISTORY      has a past surgical history that includes Mandible fracture surgery (1984); shoulder surgery (2014); Colonoscopy (2011); Dilatation, esophagus; Elbow surgery (Right, 10/7/2004); Rotator cuff repair (2004, 2014); skin biopsy (2006); Cholecystectomy, laparoscopic (6/12/15);        EMERGENCY DEPARTMENT COURSE:   Vitals:    Vitals:    01/26/24 2116   BP: (!) 157/66   Pulse: 69   Resp: 18   Temp: 98.3 °F (36.8 °C)   TempSrc: Oral   SpO2: 96%     We have done imaging of the CT of the abdomen pelvis with IV contrast.  The fluid collection found to be an abscess previously is found to be about the same size as when the drain was first put in.  So really has not gone down very much.  Measuring 5.6 x 3.0.  It appears that the tube is not dislodged.  There is still purulent material coming into the bulb.  The patient remains very uncomfortable more so than before.  I discussed the case with urology.  They feel of her pain is not well-controlled that she should come in.  I have arranged admission to the hospitalist with them to consult.      CRITICAL CARE:   none    CONSULTS:  urology    PROCEDURES:  None    FINAL IMPRESSION    Perinephric abscess    DISPOSITION/PLAN   Admitted    DISCHARGE MEDICATIONS:  New Prescriptions    No medications on file       (Please note that portions of this note were completed with a voice recognition program.  Efforts were made to edit the dictations but occasionally words are mis-transcribed.)    DO Live Elizondo Lawrence, DO  01/27/24 6733

## 2024-01-27 NOTE — ED NOTES
ED to inpatient nurses report      Chief Complaint:  Chief Complaint   Patient presents with    Abdominal Pain     Present to ED from: [place]     MOA:     LOC: alert and orientated to name, place, date  Mobility: Requires assistance * 1  Oxygen Baseline: Room Air    Current needs required: Room Air     Code Status:   Full Code          Mental Status:  Level of Consciousness: Alert (0)    Psych Assessment:        Vitals:  Patient Vitals for the past 24 hrs:   BP Temp Temp src Pulse Resp SpO2   01/27/24 0023 117/84 -- -- 64 18 96 %   01/26/24 2345 107/86 -- -- 64 17 97 %   01/26/24 2247 136/74 -- -- 63 18 94 %   01/26/24 2218 (!) 153/71 -- -- 63 18 96 %   01/26/24 2116 (!) 157/66 98.3 °F (36.8 °C) Oral 69 18 96 %        LDAs:   Peripheral IV 01/26/24 Right;Anterior Forearm (Active)   Site Assessment Clean, dry & intact 01/26/24 2303       Ambulatory Status:  No data recorded    Diagnosis:  DISPOSITION Admitted 01/27/2024 01:32:22 AM   Final diagnoses:   Perinephric abscess        Consults:  IP CONSULT TO UROLOGY     Pain Score:  Pain Assessment  Pain Assessment: 0-10  Pain Level: 10  Pain Location: Abdomen  Pain Orientation: Left, Lower, Outer    C-SSRS:   Risk of Suicide: No Risk    Sepsis Screening:  Sepsis Risk Score: 0.95    [place] Fall Risk:       Swallow Screening        Preferred Language:   English      ALLERGIES     Patient has no known allergies.    SURGICAL HISTORY       Past Surgical History:   Procedure Laterality Date    ABDOMEN SURGERY      x4 removed cysts and ovary removed    CARDIAC SURGERY      heart caths, no stents    CARPAL TUNNEL RELEASE Bilateral 2016    CHOLECYSTECTOMY, LAPAROSCOPIC  6/12/15    Dr. Huff    COLONOSCOPY  2011    CT DRAIN SOFT TISSUE ABSCESS  1/11/2024    CT DRAIN SOFT TISSUE ABSCESS 1/11/2024 [facility] CT SCAN    CYSTOSCOPY N/A 2/10/2022    CYSTOSCOPY URETHRAL IMPLANT INJECTION performed by Sami Lanier MD at [facility] OR    CYSTOSCOPY N/A 12/27/2022    Cystoscopy      enlarged liver - damaged with alcohol in past but no cirrhosis per patient    Pneumonia 07/24/2014    Seasonal allergies     sneezing    Sexual problems     Suicidal thoughts     2015 admitted to 4E from [facility]    Thyroid disease     Urinary incontinence     Vomiting     Wears dentures     Wears glasses            Electronically signed by Angel Luis Chance RN on 1/27/2024 at 1:58 AM

## 2024-01-28 LAB
ANION GAP SERPL CALC-SCNC: 11 MEQ/L (ref 8–16)
BUN SERPL-MCNC: 13 MG/DL (ref 7–22)
CALCIUM SERPL-MCNC: 10.2 MG/DL (ref 8.5–10.5)
CHLORIDE SERPL-SCNC: 103 MEQ/L (ref 98–111)
CO2 SERPL-SCNC: 27 MEQ/L (ref 23–33)
CREAT SERPL-MCNC: 0.7 MG/DL (ref 0.4–1.2)
DEPRECATED RDW RBC AUTO: 49.3 FL (ref 35–45)
ERYTHROCYTE [DISTWIDTH] IN BLOOD BY AUTOMATED COUNT: 15.9 % (ref 11.5–14.5)
GFR SERPL CREATININE-BSD FRML MDRD: > 60 ML/MIN/1.73M2
GLUCOSE SERPL-MCNC: 140 MG/DL (ref 70–108)
HCT VFR BLD AUTO: 34.5 % (ref 37–47)
HGB BLD-MCNC: 11 GM/DL (ref 12–16)
MAGNESIUM SERPL-MCNC: 1.8 MG/DL (ref 1.6–2.4)
MCH RBC QN AUTO: 28.1 PG (ref 26–33)
MCHC RBC AUTO-ENTMCNC: 31.9 GM/DL (ref 32.2–35.5)
MCV RBC AUTO: 88.2 FL (ref 81–99)
PLATELET # BLD AUTO: 266 THOU/MM3 (ref 130–400)
PMV BLD AUTO: 9.3 FL (ref 9.4–12.4)
POTASSIUM SERPL-SCNC: 4.1 MEQ/L (ref 3.5–5.2)
RBC # BLD AUTO: 3.91 MILL/MM3 (ref 4.2–5.4)
SODIUM SERPL-SCNC: 141 MEQ/L (ref 135–145)
T4 FREE SERPL-MCNC: 0.99 NG/DL (ref 0.93–1.76)
TSH SERPL DL<=0.005 MIU/L-ACNC: 2.43 UIU/ML (ref 0.4–4.2)
WBC # BLD AUTO: 7.5 THOU/MM3 (ref 4.8–10.8)

## 2024-01-28 PROCEDURE — 6370000000 HC RX 637 (ALT 250 FOR IP)

## 2024-01-28 PROCEDURE — 2580000003 HC RX 258

## 2024-01-28 PROCEDURE — 36415 COLL VENOUS BLD VENIPUNCTURE: CPT

## 2024-01-28 PROCEDURE — 1200000003 HC TELEMETRY R&B

## 2024-01-28 PROCEDURE — 85027 COMPLETE CBC AUTOMATED: CPT

## 2024-01-28 PROCEDURE — 99233 SBSQ HOSP IP/OBS HIGH 50: CPT | Performed by: HOSPITALIST

## 2024-01-28 PROCEDURE — 6360000002 HC RX W HCPCS

## 2024-01-28 PROCEDURE — 83735 ASSAY OF MAGNESIUM: CPT

## 2024-01-28 PROCEDURE — 84439 ASSAY OF FREE THYROXINE: CPT

## 2024-01-28 PROCEDURE — 80048 BASIC METABOLIC PNL TOTAL CA: CPT

## 2024-01-28 PROCEDURE — 84443 ASSAY THYROID STIM HORMONE: CPT

## 2024-01-28 RX ORDER — MAGNESIUM SULFATE IN WATER 40 MG/ML
2000 INJECTION, SOLUTION INTRAVENOUS ONCE
Status: COMPLETED | OUTPATIENT
Start: 2024-01-28 | End: 2024-01-28

## 2024-01-28 RX ADMIN — HYDROCODONE BITARTRATE AND ACETAMINOPHEN 2 TABLET: 5; 325 TABLET ORAL at 04:37

## 2024-01-28 RX ADMIN — ATORVASTATIN CALCIUM 40 MG: 40 TABLET, FILM COATED ORAL at 20:14

## 2024-01-28 RX ADMIN — Medication 2000 UNITS: at 08:58

## 2024-01-28 RX ADMIN — TRAZODONE HYDROCHLORIDE 200 MG: 100 TABLET ORAL at 20:13

## 2024-01-28 RX ADMIN — FERROUS SULFATE TAB 325 MG (65 MG ELEMENTAL FE) 325 MG: 325 (65 FE) TAB at 08:58

## 2024-01-28 RX ADMIN — SODIUM CHLORIDE, PRESERVATIVE FREE 10 ML: 5 INJECTION INTRAVENOUS at 08:57

## 2024-01-28 RX ADMIN — ESCITALOPRAM OXALATE 20 MG: 20 TABLET ORAL at 08:58

## 2024-01-28 RX ADMIN — MAGNESIUM SULFATE HEPTAHYDRATE 2000 MG: 40 INJECTION, SOLUTION INTRAVENOUS at 09:05

## 2024-01-28 RX ADMIN — LEVOTHYROXINE SODIUM 75 MCG: 0.07 TABLET ORAL at 04:19

## 2024-01-28 RX ADMIN — FENOFIBRATE 160 MG: 160 TABLET ORAL at 08:58

## 2024-01-28 RX ADMIN — CEFTRIAXONE SODIUM 1000 MG: 1 INJECTION, POWDER, FOR SOLUTION INTRAMUSCULAR; INTRAVENOUS at 13:14

## 2024-01-28 RX ADMIN — HYDROCODONE BITARTRATE AND ACETAMINOPHEN 1 TABLET: 5; 325 TABLET ORAL at 13:16

## 2024-01-28 RX ADMIN — FERROUS SULFATE TAB 325 MG (65 MG ELEMENTAL FE) 325 MG: 325 (65 FE) TAB at 20:14

## 2024-01-28 RX ADMIN — FOLIC ACID 1 MG: 1 TABLET ORAL at 08:58

## 2024-01-28 RX ADMIN — HYDROCODONE BITARTRATE AND ACETAMINOPHEN 2 TABLET: 5; 325 TABLET ORAL at 20:13

## 2024-01-28 RX ADMIN — ENOXAPARIN SODIUM 40 MG: 100 INJECTION SUBCUTANEOUS at 08:57

## 2024-01-28 RX ADMIN — ASPIRIN 81 MG: 81 TABLET, COATED ORAL at 08:57

## 2024-01-28 RX ADMIN — PANTOPRAZOLE SODIUM 40 MG: 40 TABLET, DELAYED RELEASE ORAL at 04:19

## 2024-01-28 RX ADMIN — CARVEDILOL 3.12 MG: 3.12 TABLET, FILM COATED ORAL at 20:14

## 2024-01-28 RX ADMIN — ACETAMINOPHEN 650 MG: 325 TABLET ORAL at 15:15

## 2024-01-28 RX ADMIN — SODIUM CHLORIDE, PRESERVATIVE FREE 10 ML: 5 INJECTION INTRAVENOUS at 20:14

## 2024-01-28 RX ADMIN — Medication 50 MG: at 08:58

## 2024-01-28 RX ADMIN — BUPROPION HYDROCHLORIDE 300 MG: 300 TABLET, FILM COATED, EXTENDED RELEASE ORAL at 08:58

## 2024-01-28 RX ADMIN — CARVEDILOL 3.12 MG: 3.12 TABLET, FILM COATED ORAL at 08:58

## 2024-01-28 RX ADMIN — PANTOPRAZOLE SODIUM 40 MG: 40 TABLET, DELAYED RELEASE ORAL at 15:15

## 2024-01-28 RX ADMIN — QUETIAPINE FUMARATE 600 MG: 400 TABLET ORAL at 20:14

## 2024-01-28 ASSESSMENT — PAIN DESCRIPTION - LOCATION
LOCATION: ABDOMEN
LOCATION: ABDOMEN
LOCATION: FLANK
LOCATION: ABDOMEN;FLANK

## 2024-01-28 ASSESSMENT — PAIN DESCRIPTION - DESCRIPTORS
DESCRIPTORS: ACHING
DESCRIPTORS: SORE
DESCRIPTORS: ACHING;SORE
DESCRIPTORS: ACHING;DISCOMFORT

## 2024-01-28 ASSESSMENT — PAIN SCALES - GENERAL
PAINLEVEL_OUTOF10: 1
PAINLEVEL_OUTOF10: 2
PAINLEVEL_OUTOF10: 8
PAINLEVEL_OUTOF10: 8
PAINLEVEL_OUTOF10: 6
PAINLEVEL_OUTOF10: 0
PAINLEVEL_OUTOF10: 2
PAINLEVEL_OUTOF10: 8

## 2024-01-28 ASSESSMENT — PAIN DESCRIPTION - ORIENTATION
ORIENTATION: LEFT
ORIENTATION: LEFT
ORIENTATION: LEFT;LOWER;MID
ORIENTATION: LEFT

## 2024-01-28 ASSESSMENT — PAIN DESCRIPTION - FREQUENCY: FREQUENCY: INTERMITTENT

## 2024-01-28 ASSESSMENT — PAIN SCALES - WONG BAKER
WONGBAKER_NUMERICALRESPONSE: 0
WONGBAKER_NUMERICALRESPONSE: 0

## 2024-01-28 ASSESSMENT — PAIN DESCRIPTION - ONSET: ONSET: ON-GOING

## 2024-01-28 ASSESSMENT — PAIN DESCRIPTION - PAIN TYPE: TYPE: ACUTE PAIN

## 2024-01-28 NOTE — PROGRESS NOTES
Patient educated on how to use incentive spirometer. Patient verbalized understanding and demonstrated proper use. Emphasized importance and usage of device, with coughing and deep breathing every daily while awake.

## 2024-01-28 NOTE — CARE COORDINATION
01/28/24 1204   Readmission Assessment   Number of Days since last admission? 8-30 days   Previous Disposition SNF   Who is being Interviewed Patient   What was the patient's/caregiver's perception as to why they think they needed to return back to the hospital? Other (Comment)  (started with left abd pains again)   Did you visit your Primary Care Physician after you left the hospital, before you returned this time? Yes   Did you see a specialist, such as Cardiac, Pulmonary, Orthopedic Physician, etc. after you left the hospital? Yes   Who advised the patient to return to the hospital? Skilled Unit   Does the patient report anything that got in the way of taking their medications? No   In our efforts to provide the best possible care to you and others like you, can you think of anything that we could have done to help you after you left the hospital the first time, so that you might not have needed to return so soon? Other (Comment)  (no all was fine here last time)

## 2024-01-28 NOTE — CARE COORDINATION
01/28/24 1205   Service Assessment   Patient Orientation Person;Place;Self   Cognition Alert   History Provided By Patient   Primary Caregiver Other (Comment)  (from [facility])   Accompanied By/Relationship no one   Support Systems Children;Family Members   Patient's Healthcare Decision Maker is: Named in Scanned ACP Document   PCP Verified by CM Yes   Last Visit to PCP Within last 6 months   Prior Functional Level Assistance with the following:;Bathing;Dressing;Cooking;Housework;Shopping   Current Functional Level Mobility;Shopping;Housework;Cooking;Dressing;Bathing;Assistance with the following:   Can patient return to prior living arrangement Unknown at present  (from [facility])   Ability to make needs known: Fair   Family able to assist with home care needs: No   Would you like for me to discuss the discharge plan with any other family members/significant others, and if so, who? Yes  (wants POA /so to be informed of discharge plan)   Financial Resources Medicare;Medicaid   Community Resources Other (Comment)  (from facility)   CM/SW Referral Disease Management Education;Activity/Exercise;DME;ADLs/IADLs   Social/Functional History   Lives With Alone   Type of Home Facility   Home Layout One level   Discharge Planning   Type of Residence Skilled Nursing Facility   Living Arrangements Alone   Current Services Prior To Admission None   Potential Assistance Needed Skilled Nursing Facility   DME Ordered? No   Potential Assistance Purchasing Medications No   Type of Home Care Services None   Patient expects to be discharged to: Skilled nursing facility   One/Two Story Residence One story   History of falls? 0   Services At/After Discharge   Transition of Care Consult (CM Consult) SNF   Services At/After Discharge PT;OT;Nursing services;Skilled Nursing Facility (SNF)   Confirm Follow Up Transport Wheelchair Van   Condition of Participation: Discharge Planning   Freedom of Choice list was

## 2024-01-28 NOTE — PROGRESS NOTES
Patient educated on how to use incentive spirometer. Patient verbalized understanding and demonstrated proper use. Emphasized importance and usage of device, with coughing and deep breathing every  once daily while awake.

## 2024-01-28 NOTE — PROGRESS NOTES
Hospitalist Progress Note    Patient:  Elli Coulter      Unit/Bed:6K-13/013-A    YOB: 1955    MRN: [id number]       Acct: [id number]     PCP: Marc De Oliveira MD    Date of Admission: 1/26/2024    Assessment/Plan:    Perinephric abscess, s/p percutaneous drainage placement.  Suspected drainage malfunction.  Recent admission due to left perinephric abscess, s/p percutaneous CT-guided drain placement.  There is residual fluid on the CT.  Patient completed antibiotic therapy for 2 weeks Augmentin at that time.  However pain did not improve instead worsened over the time slowly.  -Urology consulted which recommended IR drainage assessment which is planned tomorrow.  -Will continue antibiotics.  Ceftriaxone IV.  -Supportive care.  Pain control.  -Daily CBC BMP.  -Telemetry, pulse ox.  Daily weights TRICIA's.  CHF, HFpEF.  NYHA class II.  Not in exacerbation.  Nonischemic cardiomyopathy.  Last EF shows-6065%.  Clinically looks euvolemic.  CAD.  Nonobstructive.  Last LHC dated 11/16/2017 and found no obstruction.  Will continue aspirin daily low-dose.  Active smoking.  History of anemia iron deficiency.  Noted.  Hemoglobin and hematocrit/MCV remained stable.  Unspecified mood disorder/bipolar type I/depression/anxiety.  Will continue bupropion and trazodone Seroquel and escitalopram.  Hypothyroidism.  TSH/T4 free within normal limits.  Synthroid resumed.  Hepatic steatosis, history of alcohol use disorder.  Stable.  CT abdomen pelvis on admission shows hepatomegaly.  Liver panel unremarkable.        Expected discharge date: TBD    Disposition:    [] Home       [] TCU       [] Rehab       [] Psych       [] SNF       [] Long Term Care Facility       [] Other-TBD    Chief Complaint: Left side flank pain around percutaneous drainage.    Hospital Course: Initial HPI  robin Coulter is a 68 y.o. female with a PMH as detailed above and recent admission between 01/09/2024 to 01/18/2024 due to large left  ondansetron **OR** ondansetron, polyethylene glycol, acetaminophen **OR** acetaminophen, albuterol, HYDROcodone 5 mg - acetaminophen **OR** HYDROcodone 5 mg - acetaminophen      Intake/Output Summary (Last 24 hours) at 1/28/2024 1640  Last data filed at 1/28/2024 1423  Gross per 24 hour   Intake 1350.01 ml   Output 0 ml   Net 1350.01 ml       Diet:  ADULT DIET; Regular; No Added Salt (3-4 gm)    Exam:  /77   Pulse (!) 41   Temp 99.1 °F (37.3 °C) (Oral)   Resp 18   Ht 1.626 m (5' 4\")   Wt 78.6 kg (173 lb 4.5 oz)   SpO2 94%   BMI 29.74 kg/m²     General appearance: No apparent distress, appears stated age and cooperative.  HEENT: Pupils equal, round, and reactive to light. Conjunctivae/corneas clear.  Neck: Supple, with full range of motion. No jugular venous distention. Trachea midline.  Respiratory:  Normal respiratory effort. Clear to auscultation, bilaterally without Rales/Wheezes/Rhonchi.  Cardiovascular: Regular rate and rhythm with normal S1/S2 without murmurs, rubs or gallops.  Abdomen: Soft, non-tender, non-distended with normal bowel sounds.  Musculoskeletal: passive and active ROM x 4 extremities.  Skin: Skin color, texture, turgor normal.  No rashes or lesions.  Neurologic:  Neurovascularly intact without any focal sensory/motor deficits. Cranial nerves: II-XII intact, grossly non-focal.  Psychiatric: Alert and oriented, thought content appropriate, normal insight  Capillary Refill: Brisk,< 3 seconds   Peripheral Pulses: +2 palpable, equal bilaterally       Labs:   Recent Labs     01/26/24 2202 01/27/24  0344 01/28/24  0658   WBC 7.2 7.9 7.5   HGB 11.9* 12.5 11.0*   HCT 37.7 39.1 34.5*    311 266     Recent Labs     01/26/24 2202 01/27/24  0344 01/28/24  0658    138 141   K 4.0 4.3 4.1    100 103   CO2 26 30 27   BUN 11 11 13   CREATININE 0.7 0.7 0.7   CALCIUM 10.3 10.4 10.2     Recent Labs     01/26/24  2202   AST 23   ALT 31   BILITOT 0.2*   ALKPHOS 74     No results for  input(s): \"INR\" in the last 72 hours.  No results for input(s): \"CKTOTAL\", \"TROPONINI\" in the last 72 hours.    Microbiology:      Urinalysis:      Lab Results   Component Value Date/Time    NITRU NEGATIVE 01/26/2024 10:00 PM    WBCUA 2-4 01/26/2024 10:00 PM    WBCUA 0-2 04/19/2012 10:10 AM    BACTERIA NONE SEEN 01/26/2024 10:00 PM    RBCUA 0-2 01/26/2024 10:00 PM    BLOODU NEGATIVE 01/26/2024 10:00 PM    SPECGRAV 1.009 01/26/2024 10:00 PM    GLUCOSEU NEGATIVE 01/09/2024 09:30 PM       Radiology:  CT ABDOMEN PELVIS W IV CONTRAST Additional Contrast? None   Final Result   Similar left renal subcapsular collection and left retroperitoneal    collections with left retroperitoneal percutaneous drain in place.   Nonemergent/incidental findings above.      This document has been electronically signed by: Diomedes Blandon MD on    01/26/2024 11:40 PM      All CTs at this facility use dose modulation techniques and iterative    reconstructions, and/or weight-based dosing   when appropriate to reduce radiation to a low as reasonably achievable.          DVT prophylaxis: [] Lovenox                                 [x] SCDs                                 [] SQ Heparin                                 [] Encourage ambulation           [] Already on Anticoagulation     Code Status: Full Code    PT/OT Eval Status: yes    Tele:   [x] yes             [] no  Discussed with JENNIFER Daily MD  Electronically signed by Katherine Faith DO, PGY-3 on 1/28/2024 at 4:40 PM

## 2024-01-28 NOTE — PROGRESS NOTES
Patient educated on how to use incentive spirometer. Patient verbalized understanding and demonstrated proper use. Emphasized importance and usage of device, with coughing and deep breathing daily.

## 2024-01-29 LAB
ANION GAP SERPL CALC-SCNC: 10 MEQ/L (ref 8–16)
BUN SERPL-MCNC: 14 MG/DL (ref 7–22)
CALCIUM SERPL-MCNC: 10.4 MG/DL (ref 8.5–10.5)
CHLORIDE SERPL-SCNC: 103 MEQ/L (ref 98–111)
CO2 SERPL-SCNC: 26 MEQ/L (ref 23–33)
CREAT SERPL-MCNC: 0.6 MG/DL (ref 0.4–1.2)
DEPRECATED RDW RBC AUTO: 50.6 FL (ref 35–45)
ERYTHROCYTE [DISTWIDTH] IN BLOOD BY AUTOMATED COUNT: 15.6 % (ref 11.5–14.5)
GFR SERPL CREATININE-BSD FRML MDRD: > 60 ML/MIN/1.73M2
GLUCOSE SERPL-MCNC: 143 MG/DL (ref 70–108)
HCT VFR BLD AUTO: 35.9 % (ref 37–47)
HGB BLD-MCNC: 11.1 GM/DL (ref 12–16)
MAGNESIUM SERPL-MCNC: 1.6 MG/DL (ref 1.6–2.4)
MCH RBC QN AUTO: 27.8 PG (ref 26–33)
MCHC RBC AUTO-ENTMCNC: 30.9 GM/DL (ref 32.2–35.5)
MCV RBC AUTO: 90 FL (ref 81–99)
PLATELET # BLD AUTO: 254 THOU/MM3 (ref 130–400)
PMV BLD AUTO: 9.6 FL (ref 9.4–12.4)
POTASSIUM SERPL-SCNC: 4.1 MEQ/L (ref 3.5–5.2)
RBC # BLD AUTO: 3.99 MILL/MM3 (ref 4.2–5.4)
SODIUM SERPL-SCNC: 139 MEQ/L (ref 135–145)
WBC # BLD AUTO: 6.3 THOU/MM3 (ref 4.8–10.8)

## 2024-01-29 PROCEDURE — 1200000003 HC TELEMETRY R&B

## 2024-01-29 PROCEDURE — 80048 BASIC METABOLIC PNL TOTAL CA: CPT

## 2024-01-29 PROCEDURE — 85027 COMPLETE CBC AUTOMATED: CPT

## 2024-01-29 PROCEDURE — 2580000003 HC RX 258

## 2024-01-29 PROCEDURE — 6360000002 HC RX W HCPCS

## 2024-01-29 PROCEDURE — 36415 COLL VENOUS BLD VENIPUNCTURE: CPT

## 2024-01-29 PROCEDURE — 99233 SBSQ HOSP IP/OBS HIGH 50: CPT | Performed by: HOSPITALIST

## 2024-01-29 PROCEDURE — 6370000000 HC RX 637 (ALT 250 FOR IP)

## 2024-01-29 PROCEDURE — 83735 ASSAY OF MAGNESIUM: CPT

## 2024-01-29 RX ADMIN — PANTOPRAZOLE SODIUM 40 MG: 40 TABLET, DELAYED RELEASE ORAL at 06:35

## 2024-01-29 RX ADMIN — FOLIC ACID 1 MG: 1 TABLET ORAL at 10:08

## 2024-01-29 RX ADMIN — CARVEDILOL 3.12 MG: 3.12 TABLET, FILM COATED ORAL at 21:03

## 2024-01-29 RX ADMIN — FLUTICASONE PROPIONATE 1 SPRAY: 50 SPRAY, METERED NASAL at 21:05

## 2024-01-29 RX ADMIN — ATORVASTATIN CALCIUM 40 MG: 40 TABLET, FILM COATED ORAL at 21:02

## 2024-01-29 RX ADMIN — Medication 50 MG: at 10:08

## 2024-01-29 RX ADMIN — CEFTRIAXONE SODIUM 1000 MG: 1 INJECTION, POWDER, FOR SOLUTION INTRAMUSCULAR; INTRAVENOUS at 14:08

## 2024-01-29 RX ADMIN — PANTOPRAZOLE SODIUM 40 MG: 40 TABLET, DELAYED RELEASE ORAL at 16:19

## 2024-01-29 RX ADMIN — FERROUS SULFATE TAB 325 MG (65 MG ELEMENTAL FE) 325 MG: 325 (65 FE) TAB at 10:08

## 2024-01-29 RX ADMIN — ASPIRIN 81 MG: 81 TABLET, COATED ORAL at 10:08

## 2024-01-29 RX ADMIN — BUPROPION HYDROCHLORIDE 300 MG: 300 TABLET, FILM COATED, EXTENDED RELEASE ORAL at 10:08

## 2024-01-29 RX ADMIN — TRAZODONE HYDROCHLORIDE 200 MG: 100 TABLET ORAL at 21:01

## 2024-01-29 RX ADMIN — Medication 2000 UNITS: at 10:08

## 2024-01-29 RX ADMIN — ESCITALOPRAM OXALATE 20 MG: 20 TABLET ORAL at 10:08

## 2024-01-29 RX ADMIN — FENOFIBRATE 160 MG: 160 TABLET ORAL at 10:08

## 2024-01-29 RX ADMIN — HYDROCODONE BITARTRATE AND ACETAMINOPHEN 2 TABLET: 5; 325 TABLET ORAL at 06:35

## 2024-01-29 RX ADMIN — QUETIAPINE FUMARATE 600 MG: 400 TABLET ORAL at 21:02

## 2024-01-29 RX ADMIN — SODIUM CHLORIDE, PRESERVATIVE FREE 10 ML: 5 INJECTION INTRAVENOUS at 10:09

## 2024-01-29 RX ADMIN — ENOXAPARIN SODIUM 40 MG: 100 INJECTION SUBCUTANEOUS at 10:10

## 2024-01-29 RX ADMIN — SODIUM CHLORIDE, PRESERVATIVE FREE 10 ML: 5 INJECTION INTRAVENOUS at 21:04

## 2024-01-29 RX ADMIN — CARVEDILOL 3.12 MG: 3.12 TABLET, FILM COATED ORAL at 10:08

## 2024-01-29 RX ADMIN — LEVOTHYROXINE SODIUM 75 MCG: 0.07 TABLET ORAL at 06:35

## 2024-01-29 RX ADMIN — FERROUS SULFATE TAB 325 MG (65 MG ELEMENTAL FE) 325 MG: 325 (65 FE) TAB at 21:03

## 2024-01-29 ASSESSMENT — PAIN SCALES - GENERAL
PAINLEVEL_OUTOF10: 8
PAINLEVEL_OUTOF10: 2
PAINLEVEL_OUTOF10: 2
PAINLEVEL_OUTOF10: 4
PAINLEVEL_OUTOF10: 0

## 2024-01-29 ASSESSMENT — PAIN SCALES - WONG BAKER: WONGBAKER_NUMERICALRESPONSE: 0

## 2024-01-29 ASSESSMENT — PAIN DESCRIPTION - LOCATION: LOCATION: ABDOMEN;FLANK

## 2024-01-29 ASSESSMENT — PAIN DESCRIPTION - ORIENTATION: ORIENTATION: LEFT;LOWER

## 2024-01-29 ASSESSMENT — PAIN DESCRIPTION - DESCRIPTORS: DESCRIPTORS: ACHING;DISCOMFORT;SORE

## 2024-01-29 NOTE — PROGRESS NOTES
Patient educated on how to use incentive spirometer. Patient verbalized understanding and demonstrated proper use. Emphasized importance and usage of device, with coughing and deep breathing daily while awake.

## 2024-01-29 NOTE — DISCHARGE INSTR - COC
Continuity of Care Form    Patient Name: Elli Coulter   :  1955  MRN:  [id number]    Admit date:  2024  Discharge date:  ***    Code Status Order: Full Code   Advance Directives:     Admitting Physician:  No admitting provider for patient encounter.  PCP: Marc De Oliveira MD    Discharging Nurse: Pippa PUTNAM  Discharging Hospital Unit/Room#: 6K-13/013-A  Discharging Unit Phone Number: [phone]      Emergency Contact:   Extended Emergency Contact Information  Primary Emergency Contact: Bailey Trimble   [facility]  Home Phone: [phone]  Mobile Phone: [phone]  Relation: Brother/Sister  Hearing or visual needs: None  Other needs: None  Preferred language: English   needed? No  Secondary Emergency Contact: [facility] Phone: [phone]  Relation: Other    Past Surgical History:  Past Surgical History:   Procedure Laterality Date    ABDOMEN SURGERY      x4 removed cysts and ovary removed    CARDIAC SURGERY      heart caths, no stents    CARPAL TUNNEL RELEASE Bilateral 2016    CHOLECYSTECTOMY, LAPAROSCOPIC  6/12/15    Dr. Huff    COLONOSCOPY      CT DRAIN SOFT TISSUE ABSCESS  2024    CT DRAIN SOFT TISSUE ABSCESS 2024 [facility] CT SCAN    CYSTOSCOPY N/A 2/10/2022    CYSTOSCOPY URETHRAL IMPLANT INJECTION performed by Sami Lanier MD at [facility] OR    CYSTOSCOPY N/A 2022    Cystoscopy Bladder Botox 200 units Left Stent Placement performed by Sami Lanier MD at [facility] OR    CYSTOSCOPY N/A 2023    CYSTOSCOPY WITH BLADDER BOTOX 200 UNITS performed by Sami Lanier MD at [facility] OR    CYSTOSCOPY W BIOPSY OF BLADDER N/A 2020    CYSTOSCOPY WITH BLADDER BIOPSIES performed by Chris Avila MD at [facility] OR    DILATATION, ESOPHAGUS      EGD      ELBOW SURGERY Right 10/7/2004    Dr. Cronin    ELBOW SURGERY Bilateral 2016    decompression    HYSTERECTOMY (CERVIX STATUS UNKNOWN)      Dr. Manuel HUTCHINSON with [facility]    MANDIBLE FRACTURE        Isolation/Infection:   Isolation            No Isolation          Patient Infection Status       Infection Onset Added Last Indicated Last Indicated By Review Planned Expiration Resolved Resolved By    None active    Resolved    MRSA 02/21/21 02/21/21 02/21/21 MRSA by PCR   01/11/23 Trinh Bishop, INDY Ramos 2/2021                       Nurse Assessment:  Last Vital Signs: BP (!) 148/65   Pulse 57   Temp 97.9 °F (36.6 °C) (Oral)   Resp 18   Ht 1.626 m (5' 4\")   Wt 76 kg (167 lb 8.8 oz)   SpO2 95%   BMI 28.76 kg/m²     Last documented pain score (0-10 scale): Pain Level: 8  Last Weight:   Wt Readings from Last 1 Encounters:   01/31/24 76 kg (167 lb 8.8 oz)     Mental Status:  alert & oriented to self and place      IV Access:  - None    Nursing Mobility/ADLs:  Walking   Assisted  Transfer  Assisted  Bathing  Assisted  Dressing  Assisted  Toileting  Assisted  Feeding  Independent  Med Admin  Assisted  Med Delivery   whole    Wound Care Documentation and Therapy:        Elimination:  Continence:   Bowel:  Yes  Bladder: Yes  Urinary Catheter: None   Colostomy/Ileostomy/Ileal Conduit: No       Date of Last BM: 1/29/24    Intake/Output Summary (Last 24 hours) at 1/31/2024 1457  Last data filed at 1/31/2024 0819  Gross per 24 hour   Intake 840 ml   Output 0 ml   Net 840 ml     I/O last 3 completed shifts:  In: 830 [P.O.:830]  Out: 0     Safety Concerns:     At Risk for Falls    Impairments/Disabilities:      None    Nutrition Therapy:  Current Nutrition Therapy:   - Oral Diet:  General    Routes of Feeding: Oral  Liquids: Thin Liquids  Daily Fluid Restriction: no  Last Modified Barium Swallow with Video (Video Swallowing Test): not done    Treatments at the Time of Hospital Discharge:   Respiratory Treatments: n/a  Oxygen Therapy:  is not on home oxygen therapy.  Ventilator:    - No ventilator support    Rehab Therapies: Physical Therapy and Occupational Therapy  Weight Bearing Status/Restrictions: No  weight bearing restrictions  Other Medical Equipment (for information only, NOT a DME order):  walker  Other Treatments: n/a    Patient's personal belongings (please select all that are sent with patient):  None    RN SIGNATURE:  Bradly    CASE MANAGEMENT/SOCIAL WORK SECTION    Inpatient Status Date: 1/27/24    Readmission Risk Assessment Score:  Readmission Risk              Risk of Unplanned Readmission:  57           Discharging to Facility/ Agency   Name: [facility]  Address:[address] [place] [address]  Phone:[phone]  Fax:[phone]    Dialysis Facility (if applicable)   Name:  Address:  Dialysis Schedule:  Phone:  Fax:    / signature: Electronically signed by REYES Coleman on 1/29/24 at 2:40 PM EST    PHYSICIAN SECTION    Prognosis: Good    Condition at Discharge: Stable    Rehab Potential (if transferring to Rehab): Good    Recommended Labs or Other Treatments After Discharge: CT scan in 2 weeks to follow up on drain.    Physician Certification: I certify the above information and transfer of Elli Coulter  is necessary for the continuing treatment of the diagnosis listed and that she requires Skilled Nursing Facility for greater 30 days.     Update Admission H&P: No change in H&P    PHYSICIAN SIGNATURE:  Electronically signed by Antonio Jerry DO on 1/31/24 at 4:00 PM EST

## 2024-01-29 NOTE — PROGRESS NOTES
Patient educated on how to use incentive spirometer. Patient verbalized understanding and demonstrated proper use. Emphasized importance and usage of device, with coughing and deep breathing every day while awake.

## 2024-01-29 NOTE — CARE COORDINATION
1/29/24, 2:36 PM EST  Discharge Planning Evaluation  Social work consult received, patient from [facility].    Patient/Family preference is to return to [facility].    The patient's current payor source at the facility is [facility] Medicare.   Medicare skilled days available: yes  Medicare does the patient have a three midnight qualifying stay? NA  Insurance precert:  yes  Spoke with Radha at the facility.  Patient bed hold: yes  Anticipated transport plan: ambulette  Patient's Healthcare Decision Maker: Named in Scanned ACP Document  Readmission Risk Low 0-14, Mod 15-19), High > 20: Readmission Risk Score: 26.4    Current PCP: Marc De Oliveira MD  PCP verified by CM? Yes    Patient Orientation: Person, Place, Self    Patient Cognition: Alert  History Provided by: Patient    Advance Directives:      Code Status: Full Code   Patient's Primary Decision Maker is: Named in Scanned ACP Document    Primary Decision Maker: ONE,NO - Child - [phone]    Secondary Decision Maker (Active): Bailey Trimble - Brother/Sister - [phone]     Discharge Planning:    Patient lives with: Alone Type of Home: Skilled Nursing Facility  Primary Care Giver: Other (Comment) (from [facility])  Patient Support Systems include: Children, Family Members   Current Financial resources: Medicare, Medicaid  Current community resources: Other (Comment) (from facility)  Current services prior to admission: None            Current DME:              Type of Home Care services:  None    ADLS  Prior functional level: Assistance with the following:, Bathing, Dressing, Cooking, Housework, Shopping  Current functional level: Mobility, Shopping, Housework, Cooking, Dressing, Bathing, Assistance with the following:    Family can provide assistance at DC: No  Would you like Case Management to discuss the discharge plan with any other family members/significant others, and if so, who? Yes (wants POA /so to be informed of discharge plan)  Plans to

## 2024-01-29 NOTE — PLAN OF CARE
Problem: Discharge Planning  Goal: Discharge to home or other facility with appropriate resources  1/29/2024 1517 by Sofia Meeks, RN  Outcome: Progressing  1/29/2024 1441 by Alyssa Teran LSW  Outcome: Progressing  Flowsheets (Taken 1/29/2024 0732 by Sofia Meeks, RN)  Discharge to home or other facility with appropriate resources: Identify barriers to discharge with patient and caregiver     Problem: Pain  Goal: Verbalizes/displays adequate comfort level or baseline comfort level  Outcome: Progressing     Problem: Safety - Adult  Goal: Free from fall injury  Outcome: Progressing     Problem: ABCDS Injury Assessment  Goal: Absence of physical injury  Outcome: Progressing     Problem: Chronic Conditions and Co-morbidities  Goal: Patient's chronic conditions and co-morbidity symptoms are monitored and maintained or improved  Outcome: Progressing  Flowsheets (Taken 1/29/2024 0732)  Care Plan - Patient's Chronic Conditions and Co-Morbidity Symptoms are Monitored and Maintained or Improved: Monitor and assess patient's chronic conditions and comorbid symptoms for stability, deterioration, or improvement

## 2024-01-29 NOTE — PLAN OF CARE
Problem: Discharge Planning  Goal: Discharge to home or other facility with appropriate resources  Outcome: Progressing     Problem: Pain  Goal: Verbalizes/displays adequate comfort level or baseline comfort level  Outcome: Progressing  Flowsheets (Taken 1/28/2024 1955)  Verbalizes/displays adequate comfort level or baseline comfort level:   Encourage patient to monitor pain and request assistance   Assess pain using appropriate pain scale   Administer analgesics based on type and severity of pain and evaluate response     Problem: Safety - Adult  Goal: Free from fall injury  Outcome: Progressing     Problem: ABCDS Injury Assessment  Goal: Absence of physical injury  Outcome: Progressing

## 2024-01-29 NOTE — PROGRESS NOTES
Urology Progress Note    Reason for Consult:  Discussed with Dr. Mancini to admit for pain management and have reassessment by urology   Requesting Physician:  medicine     History Obtained From:  patient, electronic medical record     Chief Complaint: abd pain    Subjective: \"    Patient is resting in bed, voiding spontaneously, +flatus, +BM, ambulating with assistance, tolerating regular diet, denies any nausea or vomiting. There are complaints of controlled pain at this time.    More output in drain noted this AM, approx 75ml out  Will have IR evaluate, possibly exchange drain    IMPRESSION/Plan:    Denies pain  Keep drain in place  Fluid collection similar to previous, will take tincture of time to resolve  Has OV 2/15     Left sided perinephric abscess s/p recent drainage - Multiple hospitalization last yr. 1st episode on 1/15/23. Multiple IR guided abscess drainage, most recent 23.   Was recently seen at Urology office on 24; pigtail drain removed. Follows Dr. Lorenzo.   Similar  heterogeneous left posterolateral subcapsular renal collection measuring  5.6 x 3.0 cm (previously 5.6 x 3.1 cm). Unchanged trace adjacent left  retroperitoneal fluid with left percutaneous pigtail catheter in place.  Similar adjacent poorly delineated more medial left retroperitoneal  collection abutting the left psoas muscle measuring approximately 3.0 x  1.5 cm (previously 3.0 x 1.4 cm).  Left renal stones - non obstructing, monitor outpatient        Vitals:  /63   Pulse 57   Temp 97.3 °F (36.3 °C) (Oral)   Resp 17   Ht 1.626 m (5' 4\")   Wt 77.6 kg (171 lb 1.2 oz)   SpO2 90%   BMI 29.37 kg/m²   Temp  Av.1 °F (36.7 °C)  Min: 97.3 °F (36.3 °C)  Max: 99.1 °F (37.3 °C)    Intake/Output Summary (Last 24 hours) at 2024 1105  Last data filed at 2024 0810  Gross per 24 hour   Intake 1934.57 ml   Output 20 ml   Net 1914.57 ml       Social History     Socioeconomic History    Marital status:       and oriented.     Labs:  WBC:    Lab Results   Component Value Date/Time    WBC 6.3 01/29/2024 04:40 AM     Hemoglobin/Hematocrit:    Lab Results   Component Value Date/Time    HGB 11.1 01/29/2024 04:40 AM    HCT 35.9 01/29/2024 04:40 AM     BMP:    Lab Results   Component Value Date/Time     01/29/2024 04:40 AM    K 4.1 01/29/2024 04:40 AM    K 4.4 12/01/2023 04:40 AM     01/29/2024 04:40 AM    CO2 26 01/29/2024 04:40 AM    BUN 14 01/29/2024 04:40 AM    LABALBU 4.0 01/26/2024 10:02 PM    LABALBU 4.3 04/19/2012 10:25 AM    CREATININE 0.6 01/29/2024 04:40 AM    CALCIUM 10.4 01/29/2024 04:40 AM    GFRAA >60 02/25/2019 08:53 PM    LABGLOM >60 01/29/2024 04:40 AM       Troy Rinaldi, APRN - CNP, APRN  01/29/24 11:05 AM  Urology

## 2024-01-29 NOTE — PROGRESS NOTES
[facility] Contin[facility] Nurse  Progress Note       Elli Coulter  AGE: 68 y.o.   GENDER: female  : 1955  UNIT: 6K-13/013-A  TODAY'S DATE:  2024  ADMISSION DATE: 2024  9:12 PM    Summary:     Consult received for L flank PCT for perinephric abscess placed 24. Wound ostomy does not manage this type of drain. Defer to primary RN and urology.

## 2024-01-29 NOTE — PROGRESS NOTES
Internal Medicine Resident  Progress Note      Patient:  Elli Coulter    Unit/Bed:6K-13/013-A  YOB: 1955  MRN: [id number]   Acct: [id number]   PCP: Marc De Oliveira MD  Date of Admission: 1/26/2024  Date of Service: Pt seen/examined on 01/29/24      Assessment/Plan:  Left perinephric abscess s/p PTC 1/11/2024: Presenting with worsening drain site pain starting 01/26/24. CTAAP on admit demonstrated a medial L retroperitoneal collection a unchanged L posterolateral subcapsular renal collection measuring 5.6 x 3.0 cm (previously 5.6 x 3.1 cm). No evidence of persistent or new obstructive uropathy. Per ED, Urology would appreciate admission for pain management and will reassess abscess. No s/s of active infection right now as UA is clean and pt is afebrile, non-tachycardic, non-tachypneic, non-diaphoretic, non-leukocytotic.  Previous cultures show Proteus patient was treated with Augmentin for about 2 weeks.  However the FELICIA greater than 8 is not adequate coverage.  Patient received Zosyn 1/10/2024 to 1/11/2024, Augmentin 1/11/2024 to 1/25/2024.  Urology following: Plan to consult IR to see if drain needs to be repositioned.  Continue with ceftriaxone, will change to cefdinir tomorrow 1/30/2020  Will continue with [place] for pain  Will need to follow-up with pain management outpatient  Chronic HFpEF EF 60 to 65% in 1/9/2024:Stable. No s/s of exacerbation. Not on diuretic.  Continue carvedilol.    Strict I&O. Daily weights. Low sodium diet.   Refer to HF clinic on discharge  Nonobstructive CAD: Left heart cath 11/16/2017: No significant obstructive coronary artery disease  Continue aspirin  Hypertriglyceridemia: Stable. Continue statin / fenofibrate.   Erosive Esophagitis w/ Distal Schatzki's Ring / Hiatal Hernia / Gastritis: Stable. Noted on EGD 05/27/22. Continue PPI.   Diverticulosis: Stable. Colonoscopy 06/10/22. Noted.   Hepatic Steatosis 2/2 H/o Alcohol Use Disorder: Stable. CTA

## 2024-01-30 PROCEDURE — 2580000003 HC RX 258

## 2024-01-30 PROCEDURE — 97110 THERAPEUTIC EXERCISES: CPT

## 2024-01-30 PROCEDURE — 99232 SBSQ HOSP IP/OBS MODERATE 35: CPT

## 2024-01-30 PROCEDURE — 97116 GAIT TRAINING THERAPY: CPT

## 2024-01-30 PROCEDURE — 6370000000 HC RX 637 (ALT 250 FOR IP)

## 2024-01-30 PROCEDURE — 97162 PT EVAL MOD COMPLEX 30 MIN: CPT

## 2024-01-30 PROCEDURE — 6360000002 HC RX W HCPCS

## 2024-01-30 PROCEDURE — 99232 SBSQ HOSP IP/OBS MODERATE 35: CPT | Performed by: HOSPITALIST

## 2024-01-30 PROCEDURE — 97535 SELF CARE MNGMENT TRAINING: CPT

## 2024-01-30 PROCEDURE — 1200000003 HC TELEMETRY R&B

## 2024-01-30 PROCEDURE — 97166 OT EVAL MOD COMPLEX 45 MIN: CPT

## 2024-01-30 RX ADMIN — FERROUS SULFATE TAB 325 MG (65 MG ELEMENTAL FE) 325 MG: 325 (65 FE) TAB at 10:15

## 2024-01-30 RX ADMIN — ENOXAPARIN SODIUM 40 MG: 100 INJECTION SUBCUTANEOUS at 10:16

## 2024-01-30 RX ADMIN — HYDROCODONE BITARTRATE AND ACETAMINOPHEN 1 TABLET: 5; 325 TABLET ORAL at 15:10

## 2024-01-30 RX ADMIN — ACETAMINOPHEN 650 MG: 325 TABLET ORAL at 18:28

## 2024-01-30 RX ADMIN — ESCITALOPRAM OXALATE 20 MG: 20 TABLET ORAL at 10:15

## 2024-01-30 RX ADMIN — PANTOPRAZOLE SODIUM 40 MG: 40 TABLET, DELAYED RELEASE ORAL at 15:11

## 2024-01-30 RX ADMIN — ATORVASTATIN CALCIUM 40 MG: 40 TABLET, FILM COATED ORAL at 20:29

## 2024-01-30 RX ADMIN — FOLIC ACID 1 MG: 1 TABLET ORAL at 10:15

## 2024-01-30 RX ADMIN — BUPROPION HYDROCHLORIDE 300 MG: 300 TABLET, FILM COATED, EXTENDED RELEASE ORAL at 10:15

## 2024-01-30 RX ADMIN — HYDROCODONE BITARTRATE AND ACETAMINOPHEN 2 TABLET: 5; 325 TABLET ORAL at 10:15

## 2024-01-30 RX ADMIN — CARVEDILOL 3.12 MG: 3.12 TABLET, FILM COATED ORAL at 20:30

## 2024-01-30 RX ADMIN — HYDROCODONE BITARTRATE AND ACETAMINOPHEN 2 TABLET: 5; 325 TABLET ORAL at 01:16

## 2024-01-30 RX ADMIN — PANTOPRAZOLE SODIUM 40 MG: 40 TABLET, DELAYED RELEASE ORAL at 04:18

## 2024-01-30 RX ADMIN — Medication 50 MG: at 10:15

## 2024-01-30 RX ADMIN — CEFTRIAXONE SODIUM 1000 MG: 1 INJECTION, POWDER, FOR SOLUTION INTRAMUSCULAR; INTRAVENOUS at 13:13

## 2024-01-30 RX ADMIN — TRAZODONE HYDROCHLORIDE 200 MG: 100 TABLET ORAL at 20:30

## 2024-01-30 RX ADMIN — FERROUS SULFATE TAB 325 MG (65 MG ELEMENTAL FE) 325 MG: 325 (65 FE) TAB at 20:30

## 2024-01-30 RX ADMIN — Medication 2000 UNITS: at 10:15

## 2024-01-30 RX ADMIN — HYDROCODONE BITARTRATE AND ACETAMINOPHEN 2 TABLET: 5; 325 TABLET ORAL at 20:29

## 2024-01-30 RX ADMIN — ASPIRIN 81 MG: 81 TABLET, COATED ORAL at 10:15

## 2024-01-30 RX ADMIN — LEVOTHYROXINE SODIUM 75 MCG: 0.07 TABLET ORAL at 04:19

## 2024-01-30 RX ADMIN — SODIUM CHLORIDE, PRESERVATIVE FREE 10 ML: 5 INJECTION INTRAVENOUS at 10:16

## 2024-01-30 RX ADMIN — QUETIAPINE FUMARATE 600 MG: 400 TABLET ORAL at 20:30

## 2024-01-30 RX ADMIN — SODIUM CHLORIDE, PRESERVATIVE FREE 10 ML: 5 INJECTION INTRAVENOUS at 20:31

## 2024-01-30 RX ADMIN — FENOFIBRATE 160 MG: 160 TABLET ORAL at 10:15

## 2024-01-30 ASSESSMENT — PAIN DESCRIPTION - DESCRIPTORS
DESCRIPTORS: ACHING;PRESSURE
DESCRIPTORS: SHARP
DESCRIPTORS: SHARP
DESCRIPTORS: ACHING;SORE;DISCOMFORT
DESCRIPTORS: SHARP

## 2024-01-30 ASSESSMENT — PAIN DESCRIPTION - LOCATION
LOCATION: BACK;FLANK
LOCATION: BACK;FLANK
LOCATION: ABDOMEN;BACK
LOCATION: ABDOMEN;FLANK
LOCATION: ABDOMEN
LOCATION: BACK;FLANK

## 2024-01-30 ASSESSMENT — PAIN SCALES - WONG BAKER
WONGBAKER_NUMERICALRESPONSE: 0
WONGBAKER_NUMERICALRESPONSE: 0

## 2024-01-30 ASSESSMENT — PAIN SCALES - GENERAL
PAINLEVEL_OUTOF10: 0
PAINLEVEL_OUTOF10: 8
PAINLEVEL_OUTOF10: 8
PAINLEVEL_OUTOF10: 3
PAINLEVEL_OUTOF10: 8
PAINLEVEL_OUTOF10: 8

## 2024-01-30 ASSESSMENT — PAIN DESCRIPTION - ORIENTATION
ORIENTATION: LEFT

## 2024-01-30 NOTE — PLAN OF CARE
Problem: Discharge Planning  Goal: Discharge to home or other facility with appropriate resources  1/29/2024 2302 by Eduarda Paiz RN  Outcome: Progressing  1/29/2024 1517 by Sofia Meeks RN  Outcome: Progressing  1/29/2024 1441 by Alyssa Teran LSW  Outcome: Progressing  Flowsheets (Taken 1/29/2024 0732 by Sofia Meeks RN)  Discharge to home or other facility with appropriate resources: Identify barriers to discharge with patient and caregiver     Problem: Pain  Goal: Verbalizes/displays adequate comfort level or baseline comfort level  1/29/2024 2302 by Eduarda Paiz RN  Outcome: Progressing  1/29/2024 1517 by Sofia Meeks RN  Outcome: Progressing     Problem: Safety - Adult  Goal: Free from fall injury  1/29/2024 2302 by Eduarda Paiz RN  Outcome: Progressing  1/29/2024 1517 by Sofia Meeks RN  Outcome: Progressing     Problem: ABCDS Injury Assessment  Goal: Absence of physical injury  1/29/2024 2302 by Eduarda Paiz RN  Outcome: Progressing  1/29/2024 1517 by Sofia Meeks RN  Outcome: Progressing     Problem: Chronic Conditions and Co-morbidities  Goal: Patient's chronic conditions and co-morbidity symptoms are monitored and maintained or improved  1/29/2024 2302 by Eduarda Paiz RN  Outcome: Progressing  1/29/2024 1517 by Sofia Meeks RN  Outcome: Progressing  Flowsheets (Taken 1/29/2024 0732)  Care Plan - Patient's Chronic Conditions and Co-Morbidity Symptoms are Monitored and Maintained or Improved: Monitor and assess patient's chronic conditions and comorbid symptoms for stability, deterioration, or improvement

## 2024-01-30 NOTE — PROGRESS NOTES
Urology Progress Note      Impression/Plan:  L sided perinephric abscess s/p recent drainage   -Most recent CT abdomen and pelvis on January 26, 2024.  Findings include similar left renal subcapsular collection and left retroperitoneal collections with left retroperitoneal percutaneous drain in place.  -Reports of significant drain output.  200 cc observed today.  -IR has reviewed the case and at this time does not recommend trying to move the drain.  -Will plan to reimage in 1 to 2 weeks.  Area is likely to take time to resolve  L renal stones  -non obstructing, monitor outpatient        -Currently has an office visit on February 15  -Discussed with Dr. Lorenzo      Reason for Consult:  Discussed with Dr. Lorenzo to admit for pain management and have reassessment by urology   Requesting Physician:  medicine     History Obtained From:  patient, electronic medical record     Chief Complaint: abd pain    HPI: Ms. Lynne is a 68-year-old female that presents with a L sided perinephric abscess s/p recent drainage. She has had several hospitalization over the last year, with the first being on 1/15/2023. IR has performed multiple procedures to assist with draining this abscess, with the most recent being on 12/1/2023. She was seen in the clinic on 1/4/2024 when her pigtail drain was removed. She presented to the ED with concerns of high output drainage, between 200-400 cc. Some pain at the drainage site. However, no erythema or purulent discharge. CT A/P on 1/11/2024 with 4.3 x 2.4 cm posterior subcapsular collection of the left kidney. This probably communicates with a 5.8 x 2.7 cm left retrorenal fluid collection. The findings are grossly unchanged and compatible with the history of abscess.  She subsequently underwent drainage procedure with interventional radiology on January 11, 2024. Repeat CT on 1/17/2024 with a relatively stable 5.6 x 3.1 cm subcapsular collection of the left kidney and a pigtail catheter is seen in  the region of the previously seen left retroperitoneal collection. There is a small residual 3 x 1.4 cm collection related to the left psoas. Patient with continued output from drain. Most recent CT on  with Similar left renal subcapsular collection and left retroperitoneal collections with left retroperitoneal percutaneous drain in place.      Also with hx of L renal stones.     Subjective:   Patient is a poor historian.  She is sitting up in the chair today.  Discussed IR recommendations with her.    Pain:  Controlled   N/V:  Denies  Chest pain:  Denies  SOB:  Reports of some SOB. Primary notified.   Calf pain:  Denies    Diet:  ADULT DIET; Regular; No Added Salt (3-4 gm)   Activity:  Up with assistance         Vitals:  BP (!) 118/54   Pulse 58   Temp 98.5 °F (36.9 °C) (Oral)   Resp 18   Ht 1.626 m (5' 4\")   Wt 78.1 kg (172 lb 2.9 oz)   SpO2 95%   BMI 29.55 kg/m²   Temp  Av.1 °F (36.7 °C)  Min: 97.8 °F (36.6 °C)  Max: 98.5 °F (36.9 °C)    Intake/Output Summary (Last 24 hours) at 2024 0834  Last data filed at 2024 0402  Gross per 24 hour   Intake 1205 ml   Output 75 ml   Net 1130 ml       Social History     Socioeconomic History    Marital status:      Spouse name: Not on file    Number of children: 3    Years of education: 12    Highest education level: Not on file   Occupational History    Occupation: on disability     Employer: [employer]   Tobacco Use    Smoking status: Every Day     Current packs/day: 1.00     Average packs/day: 1 pack/day for 46.8 years (46.8 ttl pk-yrs)     Types: Cigarettes     Start date: 1977    Smokeless tobacco: Never    Tobacco comments:     Trying to quit   Vaping Use    Vaping Use: Never used   Substance and Sexual Activity    Alcohol use: No     Comment: none for 2 years    Drug use: Not Currently     Frequency: 1.0 times per week     Types: Cocaine    Sexual activity: Not Currently   Other Topics Concern    Not on file  Maternal Grandfather     Heart Disease Paternal Grandmother     High Cholesterol Paternal Grandmother     Heart Disease Paternal Grandfather     High Cholesterol Paternal Grandfather     Colon Cancer Neg Hx     Inflam Bowel Dis Neg Hx      No Known Allergies      Constitutional:  no acute distress, and cooperative to examination with appropriate mood and affect.   HEENT:   Head:         Normocephalic and atraumatic.   Eyes:         EOM are normal. No scleral icterus.  Nose:    The external appearance of the nose is normal  Ears:     The ears appear normal to external inspection.   Neck: Supple  Pulmonary/Chest:No use of accessory muscles.             Neurological: No focal deficit  Skin: Visible skin is warm and Dry  Psych: No hallucinations    Labs:  WBC:    Lab Results   Component Value Date/Time    WBC 6.3 01/29/2024 04:40 AM     Hemoglobin/Hematocrit:    Lab Results   Component Value Date/Time    HGB 11.1 01/29/2024 04:40 AM    HCT 35.9 01/29/2024 04:40 AM     BMP:    Lab Results   Component Value Date/Time     01/29/2024 04:40 AM    K 4.1 01/29/2024 04:40 AM    K 4.4 12/01/2023 04:40 AM     01/29/2024 04:40 AM    CO2 26 01/29/2024 04:40 AM    BUN 14 01/29/2024 04:40 AM    LABALBU 4.0 01/26/2024 10:02 PM    LABALBU 4.3 04/19/2012 10:25 AM    CREATININE 0.6 01/29/2024 04:40 AM    CALCIUM 10.4 01/29/2024 04:40 AM    GFRAA >60 02/25/2019 08:53 PM    LABGLOM >60 01/29/2024 04:40 AM       Mary Ellen Ray PA-C  01/30/24 8:34 AM  Urology

## 2024-01-30 NOTE — CARE COORDINATION
1/30/24, 2:19 PM EST    DISCHARGE ON GOING EVALUATION    Elli CHEATHAM [facility] day: 3  [facility] Reason for admit: Perinephric abscess [N15.1]   Procedure: 1/26 CT abd: Similar left renal subcapsular collection and left retroperitoneal   collections with left retroperitoneal percutaneous drain in place  Barriers to Discharge: IV rocephin, pain control. Drain to remain in place, with re-imaging in 2 weeks. Urology signed off. Precert initiated for [facility].   PCP: Marc De Oliveira MD  Readmission Risk Score: 26.6%  Patient Goals/Plan/Treatment Preferences: return to [facility]     Pt verbalized understanding and gave permission for possible discharge within 4 hours of receiving IMM.     IMM Letter  IMM Letter given to Patient/Family/Significant other/Guardian/POA/by:: Wilbert BIRD  IMM Letter date given:: 01/31/24  IMM Letter time given:: 4060

## 2024-01-30 NOTE — CARE COORDINATION
1/30/24, 1:20 PM EST    DISCHARGE PLANNING EVALUATION    ROBERTO spoke with Radha with [facility], requested pre-cert be started today.

## 2024-01-30 NOTE — PROGRESS NOTES
[facility]  INPATIENT OCCUPATIONAL THERAPY  STRZ RENAL TELEMETRY 6K  EVALUATION    Time:   Time In: 07  Time Out: 0811  Timed Code Treatment Minutes: 14 Minutes  Minutes: 22          Date: 2024  Patient Name: Elli Coulter,   Gender: female      MRN: [id number]  : 1955  (68 y.o.)  Referring Practitioner: Mary Carrillo MD  Diagnosis: Perinephric Abscess  Additional Pertinent Hx: Per H&P, \"Elli Coulter is a 68 y.o. female with a PMH as detailed above and recent admission between 2024 to 2024 due to large left perinephric abscess status post percutaneous CT-guided drain placement with residual 3 x 1.4 cm collection in close proximity of the left psoas and stable 5.6 x 3.1 cm subcapsular collection of the left kidney noted on 2024.  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  She presents today with the aforementioned chief complaint.  She reports that her drain is putting out approximately 200 to 400 cc of brown and slightly yellow fluid daily.  She denies any erythema, purulence, or swelling at the site of the drain.  She describes the drain site pain as sharp and burning radiating down the lateral aspect to her mid thigh.  Denies any suprapubic tenderness but does endorse intermittent hematuria that is decreasing in frequency.  She did endorse 1 episode of BRBPR 2020 4 x 1 but denies history of hemorrhoids and denies a recurrence stating that her subsequent stools have been normal in color and consistency.  Does endorse headaches but attributes them to migraines.  Does endorse 1 episode of subjective fever yesterday but denies nausea, vomiting, diaphoresis, or chills.  Denies lightheadedness or vertigo.  Patient states that the pain is now intolerable and is requesting for something to alleviate it.  Denies chest pain, shortness of breath, dysuria, flank

## 2024-01-30 NOTE — PROGRESS NOTES
Patient educated on how to use incentive spirometer. Patient verbalized understanding and demonstrated proper use. Emphasized importance and usage of device, with coughing and deep breathing every daily hours while awake.

## 2024-01-30 NOTE — PROGRESS NOTES
Patient educated on how to use incentive spirometer. Patient verbalized understanding and demonstrated proper use. Emphasized importance and usage of device, with coughing and deep breathing daily

## 2024-01-30 NOTE — PROGRESS NOTES
[facility]  INPATIENT PHYSICAL THERAPY  EVALUATION  STRZ RENAL TELEMETRY 6K - 6K-13/013-A    Discharge Recommendations:  Discharge Recommendations: Subacute/Skilled Nursing Facility    Equipment Recommendations:  Equipment Needed: No (defer to next level of care)    Time In: 909  Time Out: 940  Timed Code Treatment Minutes: 23 Minutes  Minutes: 31          Date: 2024  Patient Name: Elli Coulter,  Gender:  female        MRN: [id number]  : 1955  (68 y.o.)      Referring Practitioner: Mary Carrillo MD  Diagnosis: Perinephric abscess  Additional Pertinent Hx: 68 y.o. female with a PMH of recent hospitalization for L perinephric abscess s/p PCT 24 who presents with worsening drain site pain and unchanged abscess sizes. Urology requesting admission for pain management and reassessment of abscess.     Restrictions/Precautions:  Restrictions/Precautions: Fall Risk, General Precautions    Subjective:  Chart Reviewed: Yes  Patient assessed for rehabilitation services?: Yes  Subjective: RN approved session. Pt pleasant and agreeable to therapy    General:  Overall Orientation Status: Within Functional Limits  Vision: Within Functional Limits  Hearing: Within functional limits       Pain: 8/10: R side at drain site     Vitals: Vitals not assessed per clinical judgement, see nursing flowsheet    Social/Functional History:    Lives With: Alone  Type of Home: Facility  Home Layout: One level  Home Access: Level entry     Bathroom Shower/Tub: Walk-in shower  Bathroom Toilet: Handicap height  Bathroom Accessibility: Accessible       ADL Assistance: Needs assistance (Facility staff assists with showers. Pt reports she is Indep with dressing and toileting.)  Homemaking Assistance: Needs assistance (Facility completes.)  Ambulation Assistance: Independent  Transfer Assistance: Independent    Active : No     Additional Comments: Pt is a poor historian unsure of accuracy of upon  time included skilled education and facilitation of tasks to increase safety and independence with functional mobility for improved independence and quality of life.    Assessment:  Body Structures, Functions, Activity Limitations Requiring Skilled Therapeutic Intervention: Decreased functional mobility , Decreased endurance, Decreased strength, Decreased posture  Assessment: Elli Coulter is a 68 y.o. female that presents with drain site pain. Pt demonstrates a decrease in baseline by way of bed mobility, transfers and ambulation secondary to decreased activity tolerance, strength, fatigue, and balance deficits. Pt will benefit from skilled PT services throughout admission and beyond hospital discharge for improvements in functional mobility and in order to decrease fall risk and return pt to PLOF.   Therapy Prognosis: Good    Requires PT Follow-Up: Yes    Discharge Recommendations:  Discharge Recommendations: Subacute/Skilled Nursing Facility    Patient Education:      .    Patient Education  Education Given To: Patient  Education Provided: Role of Therapy, Plan of Care  Education Method: Verbal  Barriers to Learning: None  Education Outcome: Verbalized understanding       Equipment Recommendations:  Equipment Needed: No (defer to next level of care)    Plan:  Current Treatment Recommendations: Strengthening, Balance training, Functional mobility training, Transfer training, Endurance training, Home exercise program, Neuromuscular re-education, Gait training, Safety education & training, Therapeutic activities  General Plan:  (3-5x GM)    Goals:  Patient Goals : none stated  Short Term Goals  Time Frame for Short Term Goals: by discharge  Short Term Goal 1: bed mobility with HOB flat, no rails, mod I for increased functional ind  Short Term Goal 2: sit<>stand from various surfaces with LRD mod I for safe transfers  Short Term Goal 3: ambulate 30' with LRD mod I for safe ambulation at d/c site  Long Term  Goals  Time Frame for Long Term Goals : NA d/t short ELOS    Following session, patient left in safe position with all fall risk precautions in place.    Dilan Sagastume ([facility]) PT, DPT

## 2024-01-30 NOTE — PROGRESS NOTES
Internal Medicine Resident  Progress Note      Patient:  Elli Coulter    Unit/Bed:6K-13/013-A  YOB: 1955  MRN: [id number]   Acct: [id number]   PCP: Marc De Oliveira MD  Date of Admission: 1/26/2024  Date of Service: Pt seen/examined on 01/30/24      Assessment/Plan:  Left perinephric abscess s/p PTC 1/11/2024: Presenting with worsening drain site pain starting 01/26/24. CTAAP on admit demonstrated a medial L retroperitoneal collection a unchanged L posterolateral subcapsular renal collection measuring 5.6 x 3.0 cm (previously 5.6 x 3.1 cm). No evidence of persistent or new obstructive uropathy. Per ED, Urology would appreciate admission for pain management and will reassess abscess. No s/s of active infection right now as UA is clean and pt is afebrile, non-tachycardic, non-tachypneic, non-diaphoretic, non-leukocytotic.  Previous cultures show Proteus patient was treated with Augmentin for about 2 weeks.  However the FELICIA greater than 8 is not adequate coverage.  Patient received Zosyn 1/10/2024 to 1/11/2024, Augmentin 1/11/2024 to 1/25/2024.  Urology following: IR was consulted to state that the drain does not need to be repositioned at this time.  Okay to discharge from urology standpoint.  Continue with ceftriaxone, will change to cefdinir tomorrow 1/30/2020  Will continue with [place] for pain  Will need to follow-up with pain management outpatient  Chronic HFpEF EF 60 to 65% in 1/9/2024:Stable. No s/s of exacerbation. Not on diuretic.  Continue carvedilol.    Strict I&O. Daily weights. Low sodium diet.   Refer to HF clinic on discharge  Nonobstructive CAD: Left heart cath 11/16/2017: No significant obstructive coronary artery disease  Continue aspirin  Hypertriglyceridemia: Stable. Continue statin / fenofibrate.   Erosive Esophagitis w/ Distal Schatzki's Ring / Hiatal Hernia / Gastritis: Stable. Noted on EGD 05/27/22. Continue PPI.   Diverticulosis: Stable. Colonoscopy  BID AC    QUEtiapine, 600 mg, Oral, Nightly    traZODone, 200 mg, Oral, Nightly    pyridoxine, 50 mg, Oral, Daily    cefTRIAXone (ROCEPHIN) IV, 1,000 mg, IntraVENous, Q24H         Intake/Output Summary (Last 24 hours) at 1/30/2024 1418  Last data filed at 1/30/2024 0402  Gross per 24 hour   Intake 965 ml   Output 75 ml   Net 890 ml       Exam:  /75   Pulse 70   Temp 98.1 °F (36.7 °C) (Oral)   Resp 16   Ht 1.626 m (5' 4\")   Wt 78.1 kg (172 lb 2.9 oz)   SpO2 93%   BMI 29.55 kg/m²     General: No distress, appears stated age.  Eyes:  PERRL. Conjunctivae/corneas clear.  HENT: Head normal appearing. Nares normal. Oral mucosa moist.  Hearing intact.   Neck: Supple, with full range of motion. Trachea midline.  No gross JVD appreciated.  Respiratory:  Normal effort. Clear to auscultation, without rales or wheezes or rhonchi.  Cardiovascular: Normal rate, regular rhythm with normal S1/S2 without murmurs.    No lower extremity edema.   Abdomen: Soft, non-tender, non-distended with normal bowel sounds.  Drain in place, draining  Musculoskeletal: No joint swelling or tenderness. Normal tone. No abnormal movements.   Skin: Warm and dry. No rashes or lesions.  Neurologic:  No focal sensory/motor deficits in the upper or lower extremities. Cranial nerves:  grossly non-focal 2-12.     Psychiatric: Alert and oriented, normal insight and thought content.   Capillary Refill: Brisk,< 3 seconds.  Peripheral Pulses: +2 palpable, equal bilaterally.       Labs:   Recent Labs     01/28/24  0658 01/29/24  0440   WBC 7.5 6.3   HGB 11.0* 11.1*   HCT 34.5* 35.9*    254     Recent Labs     01/28/24  0658 01/29/24  0440    139   K 4.1 4.1    103   CO2 27 26   BUN 13 14   CREATININE 0.7 0.6   CALCIUM 10.2 10.4     No results for input(s): \"AST\", \"ALT\", \"BILIDIR\", \"BILITOT\", \"ALKPHOS\" in the last 72 hours.  No results for input(s): \"INR\" in the last 72 hours.  No results for input(s): \"TROPONINT\" in the last 72  hours.  No results for input(s): \"PROCAL\" in the last 72 hours.   Lab Results   Component Value Date/Time    NITRU NEGATIVE 01/26/2024 10:00 PM    WBCUA 2-4 01/26/2024 10:00 PM    WBCUA 0-2 04/19/2012 10:10 AM    BACTERIA NONE SEEN 01/26/2024 10:00 PM    RBCUA 0-2 01/26/2024 10:00 PM    BLOODU NEGATIVE 01/26/2024 10:00 PM    SPECGRAV 1.009 01/26/2024 10:00 PM    GLUCOSEU NEGATIVE 01/09/2024 09:30 PM       Radiology: No results found.  see assessment and plan for discussion of pertinent imaging.       DVT prophylaxis:    [x] Lovenox  [] SCDs  [] SQ Heparin  [] Encourage ambulation   [] Already on Anticoagulation  Diet: ADULT DIET; Regular; No Added Salt (3-4 gm)  Code Status: Full Code  PT/OT: Following  Tele: Normal sinus rhythm  IVF: None    Electronically signed by Antonio Jerry DO on 1/30/2024 at 2:18 PM    Case was discussed with Attending, Dr. Carrillo

## 2024-01-31 VITALS
HEIGHT: 64 IN | SYSTOLIC BLOOD PRESSURE: 116 MMHG | DIASTOLIC BLOOD PRESSURE: 61 MMHG | HEART RATE: 59 BPM | WEIGHT: 167.55 LBS | TEMPERATURE: 98.1 F | BODY MASS INDEX: 28.6 KG/M2 | RESPIRATION RATE: 18 BRPM | OXYGEN SATURATION: 96 %

## 2024-01-31 LAB — BACTERIA BLD AEROBE CULT: NORMAL

## 2024-01-31 PROCEDURE — 6370000000 HC RX 637 (ALT 250 FOR IP)

## 2024-01-31 PROCEDURE — 6360000002 HC RX W HCPCS

## 2024-01-31 PROCEDURE — 2580000003 HC RX 258

## 2024-01-31 PROCEDURE — 99239 HOSP IP/OBS DSCHRG MGMT >30: CPT | Performed by: STUDENT IN AN ORGANIZED HEALTH CARE EDUCATION/TRAINING PROGRAM

## 2024-01-31 RX ORDER — CEFDINIR 300 MG/1
300 CAPSULE ORAL 2 TIMES DAILY
Qty: 14 CAPSULE | Refills: 0 | DISCHARGE
Start: 2024-01-31 | End: 2024-02-12

## 2024-01-31 RX ORDER — OXYCODONE HYDROCHLORIDE 5 MG/1
5 TABLET ORAL EVERY 6 HOURS PRN
Status: DISCONTINUED | OUTPATIENT
Start: 2024-01-31 | End: 2024-01-31 | Stop reason: HOSPADM

## 2024-01-31 RX ORDER — FERROUS SULFATE 325(65) MG
325 TABLET ORAL
Qty: 30 TABLET | Refills: 0 | DISCHARGE
Start: 2024-01-31

## 2024-01-31 RX ORDER — OXYCODONE HYDROCHLORIDE 5 MG/1
5 TABLET ORAL EVERY 8 HOURS PRN
Qty: 9 TABLET | Refills: 0 | Status: CANCELLED | OUTPATIENT
Start: 2024-01-31 | End: 2024-02-03

## 2024-01-31 RX ORDER — LIDOCAINE 4 G/G
1 PATCH TOPICAL DAILY
DISCHARGE
Start: 2024-02-01

## 2024-01-31 RX ORDER — HYDROCODONE BITARTRATE AND ACETAMINOPHEN 5; 325 MG/1; MG/1
1 TABLET ORAL EVERY 8 HOURS PRN
Qty: 21 TABLET | Refills: 0 | Status: SHIPPED | OUTPATIENT
Start: 2024-01-31 | End: 2024-02-07

## 2024-01-31 RX ORDER — HYDROCODONE BITARTRATE AND ACETAMINOPHEN 5; 325 MG/1; MG/1
2 TABLET ORAL EVERY 8 HOURS PRN
Qty: 20 TABLET | Refills: 0 | Status: CANCELLED | OUTPATIENT
Start: 2024-01-31 | End: 2024-02-05

## 2024-01-31 RX ADMIN — FENOFIBRATE 160 MG: 160 TABLET ORAL at 08:24

## 2024-01-31 RX ADMIN — Medication 50 MG: at 08:24

## 2024-01-31 RX ADMIN — SODIUM CHLORIDE, PRESERVATIVE FREE 10 ML: 5 INJECTION INTRAVENOUS at 08:24

## 2024-01-31 RX ADMIN — Medication 2000 UNITS: at 08:24

## 2024-01-31 RX ADMIN — CARVEDILOL 3.12 MG: 3.12 TABLET, FILM COATED ORAL at 08:24

## 2024-01-31 RX ADMIN — CEFTRIAXONE SODIUM 1000 MG: 1 INJECTION, POWDER, FOR SOLUTION INTRAMUSCULAR; INTRAVENOUS at 12:37

## 2024-01-31 RX ADMIN — HYDROCODONE BITARTRATE AND ACETAMINOPHEN 2 TABLET: 5; 325 TABLET ORAL at 12:36

## 2024-01-31 RX ADMIN — PANTOPRAZOLE SODIUM 40 MG: 40 TABLET, DELAYED RELEASE ORAL at 15:19

## 2024-01-31 RX ADMIN — LEVOTHYROXINE SODIUM 75 MCG: 0.07 TABLET ORAL at 05:43

## 2024-01-31 RX ADMIN — ENOXAPARIN SODIUM 40 MG: 100 INJECTION SUBCUTANEOUS at 08:24

## 2024-01-31 RX ADMIN — PANTOPRAZOLE SODIUM 40 MG: 40 TABLET, DELAYED RELEASE ORAL at 05:43

## 2024-01-31 RX ADMIN — FERROUS SULFATE TAB 325 MG (65 MG ELEMENTAL FE) 325 MG: 325 (65 FE) TAB at 08:24

## 2024-01-31 RX ADMIN — BUPROPION HYDROCHLORIDE 300 MG: 300 TABLET, FILM COATED, EXTENDED RELEASE ORAL at 08:24

## 2024-01-31 RX ADMIN — ASPIRIN 81 MG: 81 TABLET, COATED ORAL at 08:24

## 2024-01-31 RX ADMIN — FOLIC ACID 1 MG: 1 TABLET ORAL at 08:24

## 2024-01-31 RX ADMIN — HYDROCODONE BITARTRATE AND ACETAMINOPHEN 2 TABLET: 5; 325 TABLET ORAL at 08:24

## 2024-01-31 RX ADMIN — ESCITALOPRAM OXALATE 20 MG: 20 TABLET ORAL at 08:24

## 2024-01-31 ASSESSMENT — PAIN DESCRIPTION - ORIENTATION
ORIENTATION: LEFT

## 2024-01-31 ASSESSMENT — PAIN SCALES - WONG BAKER
WONGBAKER_NUMERICALRESPONSE: 0

## 2024-01-31 ASSESSMENT — PAIN DESCRIPTION - LOCATION
LOCATION: ABDOMEN;FLANK
LOCATION: ABDOMEN;FLANK
LOCATION: ABDOMEN

## 2024-01-31 ASSESSMENT — PAIN SCALES - GENERAL
PAINLEVEL_OUTOF10: 7
PAINLEVEL_OUTOF10: 8
PAINLEVEL_OUTOF10: 7

## 2024-01-31 ASSESSMENT — PAIN DESCRIPTION - DESCRIPTORS
DESCRIPTORS: ACHING;SORE
DESCRIPTORS: ACHING;SORE;DISCOMFORT
DESCRIPTORS: SORE;ACHING

## 2024-01-31 NOTE — PLAN OF CARE
Problem: Discharge Planning  Goal: Discharge to home or other facility with appropriate resources  Outcome: Progressing  Flowsheets (Taken 1/30/2024 2132)  Discharge to home or other facility with appropriate resources:   Identify barriers to discharge with patient and caregiver   Arrange for needed discharge resources and transportation as appropriate   Identify discharge learning needs (meds, wound care, etc)   Arrange for interpreters to assist at discharge as needed   Refer to discharge planning if patient needs post-hospital services based on physician order or complex needs related to functional status, cognitive ability or social support system     Problem: Pain  Goal: Verbalizes/displays adequate comfort level or baseline comfort level  Outcome: Progressing  Flowsheets (Taken 1/30/2024 2132)  Verbalizes/displays adequate comfort level or baseline comfort level:   Encourage patient to monitor pain and request assistance   Assess pain using appropriate pain scale   Administer analgesics based on type and severity of pain and evaluate response   Implement non-pharmacological measures as appropriate and evaluate response   Consider cultural and social influences on pain and pain management   Notify Licensed Independent Practitioner if interventions unsuccessful or patient reports new pain     Problem: Safety - Adult  Goal: Free from fall injury  Outcome: Progressing  Flowsheets (Taken 1/30/2024 2132)  Free From Fall Injury:   Instruct family/caregiver on patient safety   Based on caregiver fall risk screen, instruct family/caregiver to ask for assistance with transferring infant if caregiver noted to have fall risk factors     Problem: ABCDS Injury Assessment  Goal: Absence of physical injury  Outcome: Progressing     Problem: Chronic Conditions and Co-morbidities  Goal: Patient's chronic conditions and co-morbidity symptoms are monitored and maintained or improved  Outcome: Progressing  Flowsheets (Taken  1/30/2024 2132)  Care Plan - Patient's Chronic Conditions and Co-Morbidity Symptoms are Monitored and Maintained or Improved:   Monitor and assess patient's chronic conditions and comorbid symptoms for stability, deterioration, or improvement   Collaborate with multidisciplinary team to address chronic and comorbid conditions and prevent exacerbation or deterioration   Update acute care plan with appropriate goals if chronic or comorbid symptoms are exacerbated and prevent overall improvement and discharge  Care plan reviewed with patient.  Patient verbalize understanding of the plan of care and contribute to goal setting.

## 2024-01-31 NOTE — CARE COORDINATION
1/31/24, 2:48 PM EST    Patient goals/plan/ treatment preferences discussed by  and .  Patient goals/plan/ treatment preferences reviewed with patient/ family.  Patient/ family verbalize understanding of discharge plan and are in agreement with goal/plan/treatment preferences.  Understanding was demonstrated using the teach back method.  AVS provided by RN at time of discharge, which includes all necessary medical information pertaining to the patients current course of illness, treatment, post-discharge goals of care, and treatment preferences.     Services At/After Discharge: Skilled Nursing Facility (SNF), Aide services, In ambulette, Nursing service, OT, and PT       IMM Letter  IMM Letter given to Patient/Family/Significant other/Guardian/POA/by:: Wilbert BIRD  IMM Letter date given:: 01/31/24  IMM Letter time given:: 1431       ROBERTO received call from Radha Horner, pre-cert has been approved for admission.  ROBERTO notified pt and attending.    LACP for  at 6:15 pm.  INYD Das is aware.  ROBERTO provided RN with blue packet to fax AVS when completed and call report.

## 2024-01-31 NOTE — DISCHARGE SUMMARY
Resident Discharge Summary (Hospitalist)      Patient Identification:   Elli Coulter   : 1955  MRN: [id number]   Account: [id number]   Patient's PCP: Marc De Oliveira MD    Admit Date: 2024   Discharge Date:   2024    Admitting Physician: No admitting provider for patient encounter.  Discharge Physician: Antonio Jerry DO       Discharge Diagnoses:  Left perinephric abscess s/p PTC 2024: Presenting with worsening drain site pain starting 24. CTAAP on admit demonstrated a medial L retroperitoneal collection a unchanged L posterolateral subcapsular renal collection measuring 5.6 x 3.0 cm (previously 5.6 x 3.1 cm). No evidence of persistent or new obstructive uropathy. Per ED, Urology would appreciate admission for pain management and will reassess abscess. No s/s of active infection right now as UA is clean and pt is afebrile, non-tachycardic, non-tachypneic, non-diaphoretic, non-leukocytotic.  Previous cultures show Proteus patient was treated with Augmentin for about 2 weeks.  However the FELICIA greater than 8 is not adequate coverage.  Patient received Zosyn 1/10/2024 to 2024, Augmentin 2024 to 2024.  Urology following: IR was consulted to state that the drain does not need to be repositioned at this time.  Okay to discharge from urology standpoint.  Follow-up with urology in 2 weeks with repeat imaging.  Continue cefdinir for 12 more days.  Will give [facility] for pain outpatient however patient needs to follow-up with pain management.  Will need to follow-up with pain management outpatient  Chronic HFpEF EF 60 to 65% in 2024:Stable. No s/s of exacerbation. Not on diuretic.  Continue carvedilol.    Strict I&O. Daily weights. Low sodium diet.   Nonobstructive CAD: Left heart cath 2017: No significant obstructive coronary artery disease  Continue aspirin  Hypertriglyceridemia: Stable. Continue statin / fenofibrate.   Erosive Esophagitis w/ Distal Schatzki's  endorse 1 episode of BRBPR 01/26/2020 4 x 1 but denies history of hemorrhoids and denies a recurrence stating that her subsequent stools have been normal in color and consistency.  Does endorse headaches but attributes them to migraines.  Does endorse 1 episode of subjective fever yesterday but denies nausea, vomiting, diaphoresis, or chills.  Denies lightheadedness or vertigo.  Patient states that the pain is now intolerable and is requesting for something to alleviate it.  Denies chest pain, shortness of breath, dysuria, flank pain, melena, dyschezia, blurry vision, or tinnitus.     1/29/2024: Plan for IR to assess for drain replacement versus new drain placement or adjustment of drain.     1/30/2024: Per IR no need to adjust drain at this time.  Drain is still draining.  Okay to discharge from urology standpoint.  Pre-CERT started      The patient was seen and examined on day of discharge and this discharge summary is in conjunction with any daily progress note from day of discharge.    The patient is discharged in stable condition.       Exam:   Vitals:  Vitals:    01/31/24 0815 01/31/24 0854 01/31/24 1117 01/31/24 1500   BP: 129/67  (!) 148/65 116/61   Pulse: 65  57 59   Resp: 18 16 18 18   Temp: 97.3 °F (36.3 °C)  97.9 °F (36.6 °C) 98.1 °F (36.7 °C)   TempSrc: Oral  Oral Oral   SpO2: 96%  95% 96%   Weight:       Height:         Weight: Weight - Scale: 76 kg (167 lb 8.8 oz)     General: No distress, appears stated age.  Eyes:  PERRL. Conjunctivae/corneas clear.  HENT: Head normal appearing. Nares normal. Oral mucosa moist.  Hearing intact.   Neck: Supple, with full range of motion. Trachea midline.  No gross JVD appreciated.  Respiratory:  Normal effort. Clear to auscultation, without rales or wheezes or rhonchi.  Cardiovascular: Normal rate, regular rhythm with normal S1/S2 without murmurs.    No lower extremity edema.   Abdomen: Soft, non-tender, non-distended with normal bowel sounds.  Drain in place,  Read the directions carefully, and ask your doctor or other care provider to review them with you.                STOP taking these medications      amoxicillin-clavulanate 875-125 MG per tablet  Commonly known as: AUGMENTIN               Where to Get Your Medications        You can get these medications from any pharmacy    Bring a paper prescription for each of these medications  HYDROcodone-acetaminophen 5-325 MG per tablet       Information about where to get these medications is not yet available    Ask your nurse or doctor about these medications  cefdinir 300 MG capsule  diclofenac sodium 1 % Gel  ferrous sulfate 325 (65 Fe) MG tablet  lidocaine 4 % external patch            Time Spent on discharge is 50 minutes in the examination, evaluation, counseling and review of medications and discharge plan.    Thank you Marc De Oliveira MD for the opportunity to be involved in this patient's care.      Signed:    Electronically signed by Antonio Jerry DO on 1/31/24 at 6:20 PM EST     Case was discussed with Attending, Dr. Behl

## 2024-02-01 ENCOUNTER — TELEPHONE (OUTPATIENT)
Dept: UROLOGY | Age: 69
End: 2024-02-01

## 2024-02-01 PROBLEM — D50.9 IRON DEFICIENCY ANEMIA: Status: ACTIVE | Noted: 2024-02-01

## 2024-02-01 PROBLEM — K20.90 ESOPHAGITIS: Status: ACTIVE | Noted: 2024-02-01

## 2024-02-01 PROBLEM — K57.90 DIVERTICULOSIS: Status: ACTIVE | Noted: 2024-02-01

## 2024-02-01 PROBLEM — K76.0 HEPATIC STEATOSIS: Status: ACTIVE | Noted: 2024-02-01

## 2024-02-01 PROBLEM — E78.1 HYPERTRIGLYCERIDEMIA: Status: ACTIVE | Noted: 2024-02-01

## 2024-02-01 NOTE — TELEPHONE ENCOUNTER
Ghazal at the [facility] [phone]. When the left nephrotomy tube was irrigated, it started to leak.     Was on hold for 10 minutes. Will attempt to call back later.

## 2024-02-01 NOTE — PROGRESS NOTES
AVS, DIANA, and scripts given to transport at 1815 to give to [facility]. Patient set to return to [facility]. Attempted to call report at [phone]. No answer.

## 2024-02-01 NOTE — TELEPHONE ENCOUNTER
Spoke to the [facility] Bustamante stated the dressing looks wet. Can they have orders for dressing changes. Denies any new drainage since last phone call.    Orders can be faxed to [phone].

## 2024-02-01 NOTE — PLAN OF CARE
Problem: Discharge Planning  Goal: Discharge to home or other facility with appropriate resources  Outcome: Adequate for Discharge     Problem: Pain  Goal: Verbalizes/displays adequate comfort level or baseline comfort level  Outcome: Adequate for Discharge     Problem: Safety - Adult  Goal: Free from fall injury  Outcome: Adequate for Discharge     Problem: ABCDS Injury Assessment  Goal: Absence of physical injury  Outcome: Adequate for Discharge     Problem: Chronic Conditions and Co-morbidities  Goal: Patient's chronic conditions and co-morbidity symptoms are monitored and maintained or improved  Outcome: Adequate for Discharge   Care plan reviewed with patient.  Patient verbalizes understanding of the plan of care and contributes to goal setting.

## 2024-02-02 NOTE — TELEPHONE ENCOUNTER
Makeda at the [facility] took verbal orders for dressing changes. She will call the office if it is not draining.     Patient currently sleeping and not aware of any problems with occlusion

## 2024-02-02 NOTE — TELEPHONE ENCOUNTER
Okay to change dressing PRN. Cn apply gauze with tegaderm dressing as needed when visibly soiled and once weekly.      Make sure it is draining okay as well when you call them back.

## 2024-02-06 NOTE — PROGRESS NOTES
Physician Progress Note      PATIENT:               PATRICK BUTCHER  CSN #:                  [id number]  :                       1955  ADMIT DATE:       2024 9:12 PM  DISCH DATE:        2024 6:31 PM  RESPONDING  PROVIDER #:        Ashita Behl MD          QUERY TEXT:    Pt admitted with Left perinephric abscess s/p PTC 2024. Pt noted to have   worsening drain site pain starting 24 . If possible, please document in   progress notes relationship, if any, between pain and drain complication.    The medical record reflects the following:  Risk Factors: Left perinephric abscess s/p PTC 2024  Clinical Indicators: Presenting with worsening drain site pain starting   24. discharge summary - Left perinephric abscess s/p PTC 2024. CT   on admit demonstrated a medial L retroperitoneal collection a unchanged L   posterolateral subcapsular renal  collection measuring 5.6 x 3.0 cm (previously 5.6 x 3.1 cm). DS: No evidence   of persistent or new obstructive uropathy. Urology following: IR was consulted   to state that the drain does not need to be repositioned at this time-no need   to adjust drain at this time.  Drain is still draining  Treatment: on DC Continue cefdinir for 12 more days    Thank You, Roopa CDS  Options provided:  -- post op Pain due to drain  -- Pain due to mechanical drain complication  -- Pain unrelated to mechanical drain complication  -- Other - I will add my own diagnosis  -- Disagree - Not applicable / Not valid  -- Disagree - Clinically unable to determine / Unknown  -- Refer to Clinical Documentation Reviewer    PROVIDER RESPONSE TEXT:    This patient had post pain due to drain.    Query created by: Cha Reynolds on 2024 9:26 AM      Electronically signed by:  Ashita Behl MD 2024 4:00 PM

## 2024-02-09 ENCOUNTER — APPOINTMENT (OUTPATIENT)
Dept: CT IMAGING | Age: 69
End: 2024-02-09
Payer: MEDICARE

## 2024-02-09 ENCOUNTER — HOSPITAL ENCOUNTER (EMERGENCY)
Age: 69
Discharge: HOME | End: 2024-02-09
Attending: EMERGENCY MEDICINE
Payer: MEDICARE

## 2024-02-09 ENCOUNTER — APPOINTMENT (OUTPATIENT)
Dept: INTERVENTIONAL RADIOLOGY/VASCULAR | Age: 69
End: 2024-02-09
Payer: MEDICARE

## 2024-02-09 ENCOUNTER — APPOINTMENT (OUTPATIENT)
Dept: GENERAL RADIOLOGY | Age: 69
End: 2024-02-09
Payer: MEDICARE

## 2024-02-09 VITALS
OXYGEN SATURATION: 90 % | BODY MASS INDEX: 25.95 KG/M2 | SYSTOLIC BLOOD PRESSURE: 115 MMHG | WEIGHT: 152 LBS | DIASTOLIC BLOOD PRESSURE: 96 MMHG | TEMPERATURE: 98.1 F | HEIGHT: 64 IN | RESPIRATION RATE: 21 BRPM | HEART RATE: 72 BPM

## 2024-02-09 DIAGNOSIS — N39.0 URINARY TRACT INFECTION WITH HEMATURIA, SITE UNSPECIFIED: ICD-10-CM

## 2024-02-09 DIAGNOSIS — R31.9 URINARY TRACT INFECTION WITH HEMATURIA, SITE UNSPECIFIED: ICD-10-CM

## 2024-02-09 DIAGNOSIS — T85.628A: Primary | ICD-10-CM

## 2024-02-09 DIAGNOSIS — J10.1 INFLUENZA A: ICD-10-CM

## 2024-02-09 LAB
ALBUMIN SERPL BCG-MCNC: 3.6 G/DL (ref 3.5–5.1)
ALP SERPL-CCNC: 74 U/L (ref 38–126)
ALT SERPL W/O P-5'-P-CCNC: 28 U/L (ref 11–66)
AMPHETAMINES UR QL SCN: POSITIVE
ANION GAP SERPL CALC-SCNC: 10 MEQ/L (ref 8–16)
AST SERPL-CCNC: 33 U/L (ref 5–40)
BACTERIA URNS QL MICRO: ABNORMAL /HPF
BARBITURATES UR QL SCN: NEGATIVE
BASOPHILS ABSOLUTE: 0 THOU/MM3 (ref 0–0.1)
BASOPHILS NFR BLD AUTO: 0.4 %
BENZODIAZ UR QL SCN: NEGATIVE
BILIRUB SERPL-MCNC: 0.3 MG/DL (ref 0.3–1.2)
BILIRUB UR QL STRIP.AUTO: NEGATIVE
BUN SERPL-MCNC: 10 MG/DL (ref 7–22)
BZE UR QL SCN: NEGATIVE
CALCIUM SERPL-MCNC: 10.2 MG/DL (ref 8.5–10.5)
CANNABINOIDS UR QL SCN: NEGATIVE
CASTS #/AREA URNS LPF: ABNORMAL /LPF
CASTS 2: ABNORMAL /LPF
CHARACTER UR: CLEAR
CHLORIDE SERPL-SCNC: 96 MEQ/L (ref 98–111)
CO2 SERPL-SCNC: 24 MEQ/L (ref 23–33)
COLOR: YELLOW
CREAT SERPL-MCNC: 0.7 MG/DL (ref 0.4–1.2)
CRYSTALS URNS MICRO: ABNORMAL
DEPRECATED RDW RBC AUTO: 48.8 FL (ref 35–45)
EKG ATRIAL RATE: 68 BPM
EKG P AXIS: 54 DEGREES
EKG P-R INTERVAL: 152 MS
EKG Q-T INTERVAL: 410 MS
EKG QRS DURATION: 84 MS
EKG QTC CALCULATION (BAZETT): 435 MS
EKG R AXIS: 19 DEGREES
EKG T AXIS: 66 DEGREES
EKG VENTRICULAR RATE: 68 BPM
EOSINOPHIL NFR BLD AUTO: 0.4 %
EOSINOPHILS ABSOLUTE: 0 THOU/MM3 (ref 0–0.4)
EPITHELIAL CELLS, UA: ABNORMAL /HPF
ERYTHROCYTE [DISTWIDTH] IN BLOOD BY AUTOMATED COUNT: 14.9 % (ref 11.5–14.5)
ETHANOL SERPL-MCNC: < 0.01 %
FENTANYL: NEGATIVE
FLUAV RNA RESP QL NAA+PROBE: DETECTED
FLUBV RNA RESP QL NAA+PROBE: NOT DETECTED
GFR SERPL CREATININE-BSD FRML MDRD: > 60 ML/MIN/1.73M2
GLUCOSE SERPL-MCNC: 133 MG/DL (ref 70–108)
GLUCOSE UR QL STRIP.AUTO: NEGATIVE MG/DL
HCT VFR BLD AUTO: 35.7 % (ref 37–47)
HGB BLD-MCNC: 11 GM/DL (ref 12–16)
HGB UR QL STRIP.AUTO: ABNORMAL
IMM GRANULOCYTES # BLD AUTO: 0.07 THOU/MM3 (ref 0–0.07)
IMM GRANULOCYTES NFR BLD AUTO: 1 %
KETONES UR QL STRIP.AUTO: NEGATIVE
LACTIC ACID, SEPSIS: 1 MMOL/L (ref 0.5–1.9)
LIPASE SERPL-CCNC: 17 U/L (ref 5.6–51.3)
LYMPHOCYTES ABSOLUTE: 0.8 THOU/MM3 (ref 1–4.8)
LYMPHOCYTES NFR BLD AUTO: 11 %
MAGNESIUM SERPL-MCNC: 1.5 MG/DL (ref 1.6–2.4)
MCH RBC QN AUTO: 27.5 PG (ref 26–33)
MCHC RBC AUTO-ENTMCNC: 30.8 GM/DL (ref 32.2–35.5)
MCV RBC AUTO: 89.3 FL (ref 81–99)
MISCELLANEOUS 2: ABNORMAL
MONOCYTES ABSOLUTE: 0.7 THOU/MM3 (ref 0.4–1.3)
MONOCYTES NFR BLD AUTO: 9.5 %
NEUTROPHILS NFR BLD AUTO: 77.7 %
NITRITE UR QL STRIP: POSITIVE
NRBC BLD AUTO-RTO: 0 /100 WBC
OPIATES UR QL SCN: NEGATIVE
OSMOLALITY SERPL CALC.SUM OF ELEC: 261.8 MOSMOL/KG (ref 275–300)
OXYCODONE: NEGATIVE
PCP UR QL SCN: NEGATIVE
PH UR STRIP.AUTO: 5.5 [PH] (ref 5–9)
PLATELET # BLD AUTO: 347 THOU/MM3 (ref 130–400)
PMV BLD AUTO: 8.7 FL (ref 9.4–12.4)
POTASSIUM SERPL-SCNC: 3.9 MEQ/L (ref 3.5–5.2)
PROT SERPL-MCNC: 7.5 G/DL (ref 6.1–8)
PROT UR STRIP.AUTO-MCNC: ABNORMAL MG/DL
RBC # BLD AUTO: 4 MILL/MM3 (ref 4.2–5.4)
RBC URINE: > 200 /HPF
RENAL EPI CELLS #/AREA URNS HPF: ABNORMAL /[HPF]
SARS-COV-2 RNA RESP QL NAA+PROBE: NOT DETECTED
SEGMENTED NEUTROPHILS ABSOLUTE COUNT: 5.4 THOU/MM3 (ref 1.8–7.7)
SODIUM SERPL-SCNC: 130 MEQ/L (ref 135–145)
SP GR UR REFRACT.AUTO: 1.02 (ref 1–1.03)
TROPONIN, HIGH SENSITIVITY: 15 NG/L (ref 0–12)
TSH SERPL DL<=0.005 MIU/L-ACNC: 2.42 UIU/ML (ref 0.4–4.2)
UROBILINOGEN, URINE: 1 EU/DL (ref 0–1)
WBC # BLD AUTO: 7 THOU/MM3 (ref 4.8–10.8)
WBC #/AREA URNS HPF: ABNORMAL /HPF
WBC #/AREA URNS HPF: NEGATIVE /[HPF]
YEAST LIKE FUNGI URNS QL MICRO: ABNORMAL

## 2024-02-09 PROCEDURE — 87205 SMEAR GRAM STAIN: CPT

## 2024-02-09 PROCEDURE — 87086 URINE CULTURE/COLONY COUNT: CPT

## 2024-02-09 PROCEDURE — 84443 ASSAY THYROID STIM HORMONE: CPT

## 2024-02-09 PROCEDURE — 99285 EMERGENCY DEPT VISIT HI MDM: CPT

## 2024-02-09 PROCEDURE — 85025 COMPLETE CBC W/AUTO DIFF WBC: CPT

## 2024-02-09 PROCEDURE — 87075 CULTR BACTERIA EXCEPT BLOOD: CPT

## 2024-02-09 PROCEDURE — 87186 SC STD MICRODIL/AGAR DIL: CPT

## 2024-02-09 PROCEDURE — 83605 ASSAY OF LACTIC ACID: CPT

## 2024-02-09 PROCEDURE — 83735 ASSAY OF MAGNESIUM: CPT

## 2024-02-09 PROCEDURE — 82077 ASSAY SPEC XCP UR&BREATH IA: CPT

## 2024-02-09 PROCEDURE — 84484 ASSAY OF TROPONIN QUANT: CPT

## 2024-02-09 PROCEDURE — 80053 COMPREHEN METABOLIC PANEL: CPT

## 2024-02-09 PROCEDURE — 93005 ELECTROCARDIOGRAM TRACING: CPT | Performed by: EMERGENCY MEDICINE

## 2024-02-09 PROCEDURE — 81001 URINALYSIS AUTO W/SCOPE: CPT

## 2024-02-09 PROCEDURE — C1729 CATH, DRAINAGE: HCPCS

## 2024-02-09 PROCEDURE — 10030 IMG GID FLU COLL DRG SFT TIS: CPT

## 2024-02-09 PROCEDURE — 83690 ASSAY OF LIPASE: CPT

## 2024-02-09 PROCEDURE — 87070 CULTURE OTHR SPECIMN AEROBIC: CPT

## 2024-02-09 PROCEDURE — 87077 CULTURE AEROBIC IDENTIFY: CPT

## 2024-02-09 PROCEDURE — 71045 X-RAY EXAM CHEST 1 VIEW: CPT

## 2024-02-09 PROCEDURE — 93010 ELECTROCARDIOGRAM REPORT: CPT | Performed by: INTERNAL MEDICINE

## 2024-02-09 PROCEDURE — 80307 DRUG TEST PRSMV CHEM ANLYZR: CPT

## 2024-02-09 PROCEDURE — 74150 CT ABDOMEN W/O CONTRAST: CPT

## 2024-02-09 PROCEDURE — 36415 COLL VENOUS BLD VENIPUNCTURE: CPT

## 2024-02-09 PROCEDURE — 87636 SARSCOV2 & INF A&B AMP PRB: CPT

## 2024-02-09 RX ORDER — CEFDINIR 300 MG/1
300 CAPSULE ORAL 2 TIMES DAILY
Qty: 10 CAPSULE | Refills: 0 | DISCHARGE
Start: 2024-02-09 | End: 2024-02-14

## 2024-02-09 RX ORDER — SULFAMETHOXAZOLE AND TRIMETHOPRIM 800; 160 MG/1; MG/1
1 TABLET ORAL 2 TIMES DAILY
Qty: 20 TABLET | Refills: 0 | DISCHARGE
Start: 2024-02-09 | End: 2024-02-09

## 2024-02-09 ASSESSMENT — PAIN - FUNCTIONAL ASSESSMENT: PAIN_FUNCTIONAL_ASSESSMENT: NONE - DENIES PAIN

## 2024-02-09 NOTE — PROGRESS NOTES
1255 Patient received in IR for left perirenal abscess drain insertion.   1300 This procedure has been fully reviewed with the patient and written informed consent has been obtained.  1303 Procedure started with Dr. Pablo.   1305 Procedure completed; patient tolerated well. Stay fix and op-site to exit site; no bleeding noted. JOCELYNN attached to drain.   1315 Patient on cart; comfort ensured.  1317 Report called to Jose PUTNAM in ED.   1320 Patient taken to CT via Radha MARINO.

## 2024-02-09 NOTE — ED NOTES
Patient back from IR at this time and placed back on monitoring devices. Patient denies any needs. Patient does not appear in acute distress at this time, respirs are even and unlabored with even rise and fall of chest. Call light given to patient.

## 2024-02-09 NOTE — ED TRIAGE NOTES
Pt arrived to the ed via ems with a chief complaint of a nguyễn drain that fell out today. EMS states they gave a nebulizer in route with a failed IV. Pt also states she has been feeling like crap they past two days with a productive cost. Respirations are even and unlabored. Even chest rise and fall. Alert and oriented time four. INDY Garcia and INDY Fletcher to bedside. Line labs and EKG obtained at this time. No acute distress noted.

## 2024-02-09 NOTE — ED PROVIDER NOTES
I performed a history and physical examination of the patient and discussed management with the resident. I reviewed the resident’s note and agree with the documented findings and plan of care. Any areas of disagreement are noted on the chart. I was personally present for the key portions of any procedures. I have documented in the chart those procedures where I was not present during the key portions. I have reviewed the emergency nurses triage note. I agree with the chief complaint, past medical history, past surgical history, allergies, medications, social and family history as documented unless otherwise noted below. Documentation of the HPI, Physical Exam and Medical Decision Making performed by medical students or scribes is based on my personal performance of the HPI, PE and MDM. For Phys Assistant/ Nurse Practitioner cases/documentation I have personally evaluated this patient and have completed at least one if not all key elements of the E/M (history, physical exam, and MDM). My findings are as noted below.    In other words, I personally saw and examined the patient I have reviewed and agreed with the resident findings including all diagnostic interpretations and treatment plans as written.  I was present for the key portion of any procedures performed and the inclusive time noted in any critical care statement.    Patient presents today from nursing home.  Apparently her JOCELYNN drain fell out.  Patient has had this happen to her several times.  Patient states she has not been feeling well over the last several days.  She does have a slight cough.  She has no real overt chest pain.  Patient is otherwise resting comfortably on the cot no apparent distress no other physical complaints at this time.      EKG reveals normal sinus rhythm, normal axis, ventricular rate of 68 MN interval 152 QRS duration 84 QT interval 410 QTc 435.      IR ABSCESS DRAINAGE PERC   Final Result   Successful, uncomplicated drain  replacement in the region of the left perirenal abscess.      **This report has been created using voice recognition software.  It may contain minor errors which are inherent in voice recognition technology.**      Final report electronically signed by Dr Anshul Pablo on 2/9/2024 2:22 PM      CT ABDOMEN WO CONTRAST Additional Contrast? None   Final Result   Following replacement in the interventional radiology suite, the left-sided abscess drain is in correct position.         **This report has been created using voice recognition software. It may contain minor errors which are inherent in voice recognition technology.**      Final report electronically signed by Dr Anshul Pablo on 2/9/2024 1:58 PM      XR CHEST PORTABLE   Final Result   1. No interval change since previous study dated 1/9/2024.         **This report has been created using voice recognition software. It may contain minor errors which are inherent in voice recognition technology.**      Final report electronically signed by DR ELIZABETH CORREA on 2/9/2024 12:45 PM        Labs Reviewed   COVID-19 & INFLUENZA COMBO - Abnormal; Notable for the following components:       Result Value    INFLUENZA A DETECTED (*)     All other components within normal limits   COMPREHENSIVE METABOLIC PANEL - Abnormal; Notable for the following components:    Glucose 133 (*)     Sodium 130 (*)     Chloride 96 (*)     All other components within normal limits   CBC WITH AUTO DIFFERENTIAL - Abnormal; Notable for the following components:    RBC 4.00 (*)     Hemoglobin 11.0 (*)     Hematocrit 35.7 (*)     MCHC 30.8 (*)     RDW-CV 14.9 (*)     RDW-SD 48.8 (*)     MPV 8.7 (*)     Lymphocytes Absolute 0.8 (*)     All other components within normal limits   MAGNESIUM - Abnormal; Notable for the following components:    Magnesium 1.5 (*)     All other components within normal limits   TROPONIN - Abnormal; Notable for the following components:    Troponin, High Sensitivity 15

## 2024-02-09 NOTE — ED NOTES
Pt resting at this time. Respirations are even and unlabored. No acute distress noted. Call light in reach

## 2024-02-09 NOTE — ED PROVIDER NOTES
[facility] EMERGENCY DEPT    EMERGENCY MEDICINE      Pt Name: Elli Coulter  MRN: [id number]  Birthdate 1955  Date of evaluation: 2/9/2024  Treating Physician: Dr. Harper  Resident Physician: Annie Valencia MD    CHIEF COMPLAINT       Chief Complaint   Patient presents with    Fatigue     History obtained from chart review and the patient.    HISTORY OF PRESENT ILLNESS    HPI    Elli Coulter is a 68 y.o. female with PMH of COPD, cocaine abuse, alcohol abuse, polysubstance abuse, DM presents to the emergency department for evaluation of nephrostomy tube dislodgment.  Patient brought in by EMS from [facility] and patient complaining of not feeling well for the past couple days.  She stated that one of her nurses had noticed that the nephrostomy tube would come out and that she does not know how it was dislodged.  Patient is denying any dysuria, nausea, vomiting, chest pain, fever, abdominal pain.    The patient has no other acute complaints at this time.    PAST MEDICAL AND SURGICAL HISTORY     Past Medical History:   Diagnosis Date    Allergic rhinitis     Arthritis     back, arms, hips    Bipolar 1 disorder (HCC)     Cancer (HCC) 2011    cancerous polyps removed - Dr. Silva    Cataract     Colon polyps     COPD (chronic obstructive pulmonary disease) (HCC) 07/24/2014    Coronary vasospasm (HCC) 08/17/2019    Depression     Diverticulitis of colon     GERD (gastroesophageal reflux disease)     Glaucoma     Hearing loss     Hiatal hernia     History of arterial ischemic stroke 07/20/2019    History of colonoscopy 2002    History of kidney stones     Hx of blood clots 06/17/2014    PE and collar bone area after shoulder surgery    Hyperlipidemia     Hypertension     Liver disease     enlarged liver - damaged with alcohol in past but no cirrhosis per patient    Pneumonia 07/24/2014    Seasonal allergies     sneezing    Sexual problems     Suicidal thoughts     2015 admitted to  from [facility]    Thyroid disease

## 2024-02-09 NOTE — DISCHARGE INSTRUCTIONS
Continue taking cefdinir as prescribed on 1/31 discharge for your UTI.     Urine culture was obtained from the nephrostomy drain. We will call with results once it is back.     Take your medication as indicated and prescribed.  If you are given an antibiotic then, make sure you get the prescription filled and take the antibiotics until finished.  Drink plenty of water while taking the antibiotics.  Avoid drinking alcohol or drinks that have caffeine in it while taking antibiotics.   If you were given the medication pyridium (or take over the counter Azo) - do not wear any contacts for the next week since this medication will turn your tears an orange color and will stain the contacts.      For pain use acetaminophen (Tylenol) or ibuprofen (Motrin / Advil), unless prescribed medications that have acetaminophen or ibuprofen (or similar medications) in it.  You can take over the counter acetaminophen tablets (1 - 2 tablets of the 500-mg strength every 6 hours) or ibuprofen tablets (2 tablets every 4 hours).    PLEASE RETURN TO THE EMERGENCY DEPARTMENT IMMEDIATELY for worsening symptoms, inability to urinate, worsening of blood in your urine, or if you develop any concerning symptoms such as: high fever not relieved by acetaminophen (Tylenol) and/or ibuprofen (Motrin / Advil), chills, shortness of breath, chest pain, feeling of your heart fluttering or racing, persistent nausea and/or vomiting, vomiting up blood, blood in your stool, loss of consciousness, numbness, weakness or tingling in the arms or legs or change in color of the extremities, changes in mental status, persistent headache, blurry vision, loss of bladder / bowel.

## 2024-02-09 NOTE — H&P
Formulation and discussion of sedation / procedure plans, risks, benefits, side effects and alternatives with patient and/or responsible adult completed.    Electronically signed by Anshul Pablo MD on 2/9/2024 at 1:09 PM

## 2024-02-09 NOTE — OP NOTE
Department of Radiology  Post Procedure Progress Note      Pre-Procedure Diagnosis:  Perirenal abscess.  The drain was accidentally pulled out.    Procedure Performed:  Replaced drain under CT guidance    Anesthesia: None    Findings: successful    Immediate Complications:  None    Estimated Blood Loss: minimal    SEE DICTATED PROCEDURE NOTE FOR COMPLETE DETAILS.    Electronically signed by Anshul Pablo MD on 2/9/2024 at 1:10 PM

## 2024-02-10 NOTE — ED PROVIDER NOTES
Transfer of Care Note:   Physician Signing out: Dr. Valencia  Receiving Physician: Gabrielle Guerra MD  Sign out time: 4pm    Brief history:  Patient presented due to nephrostomy tube dislodgment.     Items pending that need to be checked:  None    Tentative Impression of patient:  Nephrostomy tube dislodgment    Expected disposition of patient:  Pending results, discharged.        Additional Assessment and results:   I have personally performed a face to face diagnostic evaluation on this patient. The patient's initial evaluation and plan have been discussed with the prior physician who initially evaluated the patient. Nursing Notes, Past Medical Hx, Past Surgical Hx, Social Hx, Allergies, vital signs and Family Hx were all reviewed.      Vitals:    02/09/24 2038   BP: (!) 115/96   Pulse:    Resp:    Temp:    SpO2:      Physical Exam  Vitals reviewed.   Constitutional:       Appearance: Normal appearance.   HENT:      Head: Normocephalic and atraumatic.   Eyes:      General: No scleral icterus.     Conjunctiva/sclera: Conjunctivae normal.   Cardiovascular:      Rate and Rhythm: Normal rate and regular rhythm.      Pulses: Normal pulses.      Heart sounds: Normal heart sounds.   Pulmonary:      Effort: No respiratory distress.      Breath sounds: Normal breath sounds.   Chest:      Chest wall: No tenderness.   Abdominal:      General: Abdomen is flat. Bowel sounds are normal. There is no distension.      Palpations: Abdomen is soft.      Tenderness: There is no abdominal tenderness.   Musculoskeletal:      Right lower leg: No edema.      Left lower leg: No edema.   Skin:     General: Skin is warm and dry.      Capillary Refill: Capillary refill takes less than 2 seconds.      Coloration: Skin is not jaundiced.   Neurological:      General: No focal deficit present.      Mental Status: She is alert and oriented to person, place, and time.   Psychiatric:         Mood and Affect: Mood normal.         Behavior: Behavior  proofreading.

## 2024-02-11 LAB
BACTERIA SPEC AEROBE CULT: NORMAL
BACTERIA UR CULT: ABNORMAL
GRAM STN SPEC: NORMAL
ORGANISM: ABNORMAL

## 2024-02-13 LAB
BACTERIA UR CULT: ABNORMAL
BACTERIA UR CULT: ABNORMAL
ORGANISM: ABNORMAL
ORGANISM: ABNORMAL

## 2024-02-14 NOTE — PROGRESS NOTES
Pharmacy Note  ED Culture Follow-up    Elli Coulter is a 68 y.o. female.     Allergies: Patient has no known allergies.     Labs:  Lab Results   Component Value Date    BUN 10 02/09/2024    CREATININE 0.7 02/09/2024    WBC 7.0 02/09/2024     Estimated Creatinine Clearance: 75 mL/min (based on SCr of 0.7 mg/dL).    Current antimicrobials:   Cefdinir    ASSESSMENT:  Micro results:   Urine culture: positive for Enterococcus faecium and Klebsiella oxytoca     PLAN:  Need for intervention: Yes, but will defer to provider at nursing home  Discussed with: Foster Mancini MD  Chosen treatment:    Patient will be treated by provider at nursing home    Patient response:   Called and spoke with INDY Long at [facility].  She was made aware of the positive results.  Results faxed to [phone] per request.    Called/sent in prescription to: Not applicable    Please call with any questions. Ext. [phone]    Dominga Sanchez RPH, PharmD 3:51 PM 2/14/2024

## 2024-02-15 LAB
BACTERIA SPEC AEROBE CULT: NORMAL
BACTERIA SPEC ANAEROBE CULT: NORMAL
GRAM STN SPEC: NORMAL

## 2024-03-05 ENCOUNTER — OFFICE VISIT (OUTPATIENT)
Dept: UROLOGY | Age: 69
End: 2024-03-05

## 2024-03-05 ENCOUNTER — TELEPHONE (OUTPATIENT)
Dept: UROLOGY | Age: 69
End: 2024-03-05

## 2024-03-05 VITALS — RESPIRATION RATE: 16 BRPM | WEIGHT: 152 LBS | HEIGHT: 64 IN | BODY MASS INDEX: 25.95 KG/M2

## 2024-03-05 DIAGNOSIS — S37.019A PERINEPHRIC HEMATOMA: ICD-10-CM

## 2024-03-05 DIAGNOSIS — N39.3 STRESS INCONTINENCE IN FEMALE: ICD-10-CM

## 2024-03-05 DIAGNOSIS — N32.81 OAB (OVERACTIVE BLADDER): ICD-10-CM

## 2024-03-05 DIAGNOSIS — R35.0 URINARY FREQUENCY: ICD-10-CM

## 2024-03-05 LAB
BILIRUBIN URINE: NEGATIVE
CHARACTER, URINE: CLEAR
COLOR, URINE: YELLOW
GLUCOSE URINE: NEGATIVE MG/DL
LEUKOCYTE CLUMPS, URINE: ABNORMAL
NITRITE, URINE: POSITIVE
PH, URINE: 6.5 (ref 5–9)
PROTEIN, URINE: NEGATIVE MG/DL
SPECIFIC GRAVITY, URINE: 1.02 (ref 1–1.03)
UROBILINOGEN, URINE: 1 EU/DL (ref 0–1)

## 2024-03-05 NOTE — TELEPHONE ENCOUNTER
Patient scheduled for US RENAL COMPLETE  at [facility] on 5/31/24.  Arrival of 915 AM for a 930 AM scan time.  Order mailed with instructions or given to the patient in the office

## 2024-03-05 NOTE — PROGRESS NOTES
[facility] [place] [facility]  [facility]  [address]  [address]  [facility] [address]  Dept: [phone]  Loc: [phone]    Visit Date: 3/5/2024        HPI:     Elli Coulter is a 68 y.o. female who presents today for:  Chief Complaint   Patient presents with    overactive bladder    Follow-up       HPI  Patient presents to urology clinic for follow-up.     Mercedes is present with famiyl today. She reports feeling well. Denies flank pain, suprapubic pressure, gross hematuria, dysuria, fever or chills.   Drain replaced 2/9/24 by SUSAN CABEZAS drain output with 5cc. Family reports last emptied on Sunday.   U/a positive nitrites (asymptomatic), PVR 52ml in office today.     She currently resides at [facility].     Patient with history of following:  Kidney stone  Had cystoscopy left ureteroscopy, left holmium laser lithotripsy and stent exchange 1/12/23 by Dr. Lanier.   Nonobstructive nephrolithiasis on the left.     Incontinence  Had interstim removed by anoop  Hx of cystoscopy with bladder botox 200 units by Dr. Lanier on 8/22/2023.   Botox 100 u 4/2022 was only 20 percent helpful  Botox 200 units 12/2022    Current Outpatient Medications   Medication Sig Dispense Refill    diclofenac sodium (VOLTAREN) 1 % GEL Apply 4 g topically 2 times daily      ferrous sulfate (IRON 325) 325 (65 Fe) MG tablet Take 1 tablet by mouth every 48 hours With breakfast 30 tablet 0    lidocaine 4 % external patch Place 1 patch onto the skin daily      vitamin B-6 (B-6) 50 MG tablet Take 1 tablet by mouth daily 30 tablet 3    Catheters (BARDIA URETHRAL CATHETER 16FR) MISC 1 each by Does not apply route as needed (see) 1 16 fr catheter to be inserted may irrigate as needed with 100ml ns or sterile water daily and prn occlusion   may use any supply vendor 1 each 0    Water For Irrigation, Sterile (STERILE WATER FOR IRRIGATION) Irrigate with 100 ml ns or sterile water daily and prn for occlusion

## 2024-03-07 LAB — ORGANISM: ABNORMAL

## 2024-03-07 RX ORDER — AMOXICILLIN AND CLAVULANATE POTASSIUM 875; 125 MG/1; MG/1
1 TABLET, FILM COATED ORAL 2 TIMES DAILY
Qty: 20 TABLET | Refills: 0 | OUTPATIENT
Start: 2024-03-07 | End: 2024-03-17

## 2024-03-18 ENCOUNTER — APPOINTMENT (OUTPATIENT)
Dept: CT IMAGING | Age: 69
End: 2024-03-18
Payer: MEDICARE

## 2024-03-18 ENCOUNTER — HOSPITAL ENCOUNTER (INPATIENT)
Age: 69
LOS: 5 days | Discharge: OTHER INSTITUTION | End: 2024-03-23
Attending: INTERNAL MEDICINE
Payer: MEDICARE

## 2024-03-18 DIAGNOSIS — D50.0 IRON DEFICIENCY ANEMIA DUE TO CHRONIC BLOOD LOSS: ICD-10-CM

## 2024-03-18 DIAGNOSIS — R18.8 RETROPERITONEAL FLUID COLLECTION: Primary | ICD-10-CM

## 2024-03-18 LAB
ALBUMIN SERPL BCG-MCNC: 3 G/DL (ref 3.5–5.1)
ALP SERPL-CCNC: 102 U/L (ref 38–126)
ALT SERPL W/O P-5'-P-CCNC: 20 U/L (ref 11–66)
ANION GAP SERPL CALC-SCNC: 10 MEQ/L (ref 8–16)
AST SERPL-CCNC: 23 U/L (ref 5–40)
BASOPHILS ABSOLUTE: 0.1 THOU/MM3 (ref 0–0.1)
BASOPHILS NFR BLD AUTO: 0.5 %
BILIRUB SERPL-MCNC: 0.2 MG/DL (ref 0.3–1.2)
BUN SERPL-MCNC: 10 MG/DL (ref 7–22)
CALCIUM SERPL-MCNC: 10.5 MG/DL (ref 8.5–10.5)
CHLORIDE SERPL-SCNC: 98 MEQ/L (ref 98–111)
CO2 SERPL-SCNC: 25 MEQ/L (ref 23–33)
CREAT SERPL-MCNC: 0.7 MG/DL (ref 0.4–1.2)
DEPRECATED RDW RBC AUTO: 50.8 FL (ref 35–45)
EKG ATRIAL RATE: 61 BPM
EKG P AXIS: 64 DEGREES
EKG P-R INTERVAL: 160 MS
EKG Q-T INTERVAL: 432 MS
EKG QRS DURATION: 82 MS
EKG QTC CALCULATION (BAZETT): 434 MS
EKG R AXIS: 35 DEGREES
EKG T AXIS: 74 DEGREES
EKG VENTRICULAR RATE: 61 BPM
EOSINOPHIL NFR BLD AUTO: 1.2 %
EOSINOPHILS ABSOLUTE: 0.2 THOU/MM3 (ref 0–0.4)
ERYTHROCYTE [DISTWIDTH] IN BLOOD BY AUTOMATED COUNT: 15.3 % (ref 11.5–14.5)
GFR SERPL CREATININE-BSD FRML MDRD: > 60 ML/MIN/1.73M2
GLUCOSE SERPL-MCNC: 93 MG/DL (ref 70–108)
HCT VFR BLD AUTO: 33.6 % (ref 37–47)
HGB BLD-MCNC: 10.2 GM/DL (ref 12–16)
IMM GRANULOCYTES # BLD AUTO: 0.3 THOU/MM3 (ref 0–0.07)
IMM GRANULOCYTES NFR BLD AUTO: 2.3 %
LIPASE SERPL-CCNC: 25.5 U/L (ref 5.6–51.3)
LYMPHOCYTES ABSOLUTE: 2.8 THOU/MM3 (ref 1–4.8)
LYMPHOCYTES NFR BLD AUTO: 21.5 %
MCH RBC QN AUTO: 27.3 PG (ref 26–33)
MCHC RBC AUTO-ENTMCNC: 30.4 GM/DL (ref 32.2–35.5)
MCV RBC AUTO: 90.1 FL (ref 81–99)
MONOCYTES ABSOLUTE: 1.1 THOU/MM3 (ref 0.4–1.3)
MONOCYTES NFR BLD AUTO: 8.2 %
NEUTROPHILS NFR BLD AUTO: 66.3 %
NRBC BLD AUTO-RTO: 0 /100 WBC
OSMOLALITY SERPL CALC.SUM OF ELEC: 265.1 MOSMOL/KG (ref 275–300)
PLATELET # BLD AUTO: 507 THOU/MM3 (ref 130–400)
PMV BLD AUTO: 8.7 FL (ref 9.4–12.4)
POTASSIUM SERPL-SCNC: 4.3 MEQ/L (ref 3.5–5.2)
PROT SERPL-MCNC: 7.6 G/DL (ref 6.1–8)
RBC # BLD AUTO: 3.73 MILL/MM3 (ref 4.2–5.4)
SEGMENTED NEUTROPHILS ABSOLUTE COUNT: 8.6 THOU/MM3 (ref 1.8–7.7)
SODIUM SERPL-SCNC: 133 MEQ/L (ref 135–145)
TROPONIN, HIGH SENSITIVITY: 15 NG/L (ref 0–12)
WBC # BLD AUTO: 12.9 THOU/MM3 (ref 4.8–10.8)

## 2024-03-18 PROCEDURE — 2580000003 HC RX 258: Performed by: PHYSICIAN ASSISTANT

## 2024-03-18 PROCEDURE — 84484 ASSAY OF TROPONIN QUANT: CPT

## 2024-03-18 PROCEDURE — 85025 COMPLETE CBC W/AUTO DIFF WBC: CPT

## 2024-03-18 PROCEDURE — 83690 ASSAY OF LIPASE: CPT

## 2024-03-18 PROCEDURE — 6360000004 HC RX CONTRAST MEDICATION: Performed by: INTERNAL MEDICINE

## 2024-03-18 PROCEDURE — 6360000002 HC RX W HCPCS: Performed by: PHYSICIAN ASSISTANT

## 2024-03-18 PROCEDURE — 96374 THER/PROPH/DIAG INJ IV PUSH: CPT

## 2024-03-18 PROCEDURE — 99223 1ST HOSP IP/OBS HIGH 75: CPT | Performed by: PHYSICIAN ASSISTANT

## 2024-03-18 PROCEDURE — 1200000003 HC TELEMETRY R&B

## 2024-03-18 PROCEDURE — 6370000000 HC RX 637 (ALT 250 FOR IP): Performed by: PHYSICIAN ASSISTANT

## 2024-03-18 PROCEDURE — 6360000002 HC RX W HCPCS: Performed by: INTERNAL MEDICINE

## 2024-03-18 PROCEDURE — 74176 CT ABD & PELVIS W/O CONTRAST: CPT

## 2024-03-18 PROCEDURE — 1200000000 HC SEMI PRIVATE

## 2024-03-18 PROCEDURE — 99285 EMERGENCY DEPT VISIT HI MDM: CPT

## 2024-03-18 PROCEDURE — 93010 ELECTROCARDIOGRAM REPORT: CPT | Performed by: INTERNAL MEDICINE

## 2024-03-18 PROCEDURE — 80053 COMPREHEN METABOLIC PANEL: CPT

## 2024-03-18 PROCEDURE — 36415 COLL VENOUS BLD VENIPUNCTURE: CPT

## 2024-03-18 PROCEDURE — 93005 ELECTROCARDIOGRAM TRACING: CPT | Performed by: INTERNAL MEDICINE

## 2024-03-18 PROCEDURE — 2580000003 HC RX 258: Performed by: INTERNAL MEDICINE

## 2024-03-18 PROCEDURE — 96375 TX/PRO/DX INJ NEW DRUG ADDON: CPT

## 2024-03-18 RX ORDER — TRAZODONE HYDROCHLORIDE 100 MG/1
200 TABLET ORAL NIGHTLY
Status: DISCONTINUED | OUTPATIENT
Start: 2024-03-18 | End: 2024-03-23 | Stop reason: HOSPADM

## 2024-03-18 RX ORDER — ONDANSETRON 2 MG/ML
4 INJECTION INTRAMUSCULAR; INTRAVENOUS EVERY 6 HOURS PRN
Status: DISCONTINUED | OUTPATIENT
Start: 2024-03-18 | End: 2024-03-23 | Stop reason: HOSPADM

## 2024-03-18 RX ORDER — ALBUTEROL SULFATE 2.5 MG/3ML
2.5 SOLUTION RESPIRATORY (INHALATION) EVERY 6 HOURS PRN
Status: DISCONTINUED | OUTPATIENT
Start: 2024-03-18 | End: 2024-03-23 | Stop reason: HOSPADM

## 2024-03-18 RX ORDER — PANTOPRAZOLE SODIUM 40 MG/1
40 TABLET, DELAYED RELEASE ORAL
Status: DISCONTINUED | OUTPATIENT
Start: 2024-03-19 | End: 2024-03-23 | Stop reason: HOSPADM

## 2024-03-18 RX ORDER — POLYETHYLENE GLYCOL 3350 17 G/17G
17 POWDER, FOR SOLUTION ORAL DAILY PRN
Status: DISCONTINUED | OUTPATIENT
Start: 2024-03-18 | End: 2024-03-23 | Stop reason: HOSPADM

## 2024-03-18 RX ORDER — CARVEDILOL 3.12 MG/1
3.12 TABLET ORAL 2 TIMES DAILY
Status: DISCONTINUED | OUTPATIENT
Start: 2024-03-18 | End: 2024-03-23 | Stop reason: HOSPADM

## 2024-03-18 RX ORDER — MORPHINE SULFATE 2 MG/ML
2 INJECTION, SOLUTION INTRAMUSCULAR; INTRAVENOUS ONCE
Status: COMPLETED | OUTPATIENT
Start: 2024-03-18 | End: 2024-03-18

## 2024-03-18 RX ORDER — ATORVASTATIN CALCIUM 40 MG/1
40 TABLET, FILM COATED ORAL NIGHTLY
Status: DISCONTINUED | OUTPATIENT
Start: 2024-03-18 | End: 2024-03-23 | Stop reason: HOSPADM

## 2024-03-18 RX ORDER — LEVOTHYROXINE SODIUM 0.07 MG/1
75 TABLET ORAL
Status: DISCONTINUED | OUTPATIENT
Start: 2024-03-19 | End: 2024-03-23 | Stop reason: HOSPADM

## 2024-03-18 RX ORDER — MAGNESIUM SULFATE IN WATER 40 MG/ML
2000 INJECTION, SOLUTION INTRAVENOUS PRN
Status: DISCONTINUED | OUTPATIENT
Start: 2024-03-18 | End: 2024-03-23 | Stop reason: HOSPADM

## 2024-03-18 RX ORDER — POTASSIUM CHLORIDE 7.45 MG/ML
10 INJECTION INTRAVENOUS PRN
Status: DISCONTINUED | OUTPATIENT
Start: 2024-03-18 | End: 2024-03-23 | Stop reason: HOSPADM

## 2024-03-18 RX ORDER — ESCITALOPRAM OXALATE 20 MG/1
20 TABLET ORAL DAILY
Status: DISCONTINUED | OUTPATIENT
Start: 2024-03-18 | End: 2024-03-23 | Stop reason: HOSPADM

## 2024-03-18 RX ORDER — POTASSIUM CHLORIDE 20 MEQ/1
40 TABLET, EXTENDED RELEASE ORAL PRN
Status: DISCONTINUED | OUTPATIENT
Start: 2024-03-18 | End: 2024-03-23 | Stop reason: HOSPADM

## 2024-03-18 RX ORDER — MORPHINE SULFATE 2 MG/ML
2 INJECTION, SOLUTION INTRAMUSCULAR; INTRAVENOUS EVERY 4 HOURS PRN
Status: DISCONTINUED | OUTPATIENT
Start: 2024-03-18 | End: 2024-03-20

## 2024-03-18 RX ORDER — MORPHINE SULFATE 4 MG/ML
4 INJECTION, SOLUTION INTRAMUSCULAR; INTRAVENOUS
Status: DISCONTINUED | OUTPATIENT
Start: 2024-03-18 | End: 2024-03-20

## 2024-03-18 RX ORDER — ACETAMINOPHEN 325 MG/1
650 TABLET ORAL EVERY 6 HOURS PRN
Status: DISCONTINUED | OUTPATIENT
Start: 2024-03-18 | End: 2024-03-23 | Stop reason: HOSPADM

## 2024-03-18 RX ORDER — LEVOTHYROXINE SODIUM 0.15 MG/1
150 TABLET ORAL WEEKLY
Status: DISCONTINUED | OUTPATIENT
Start: 2024-03-24 | End: 2024-03-23 | Stop reason: HOSPADM

## 2024-03-18 RX ORDER — FOLIC ACID 1 MG/1
1 TABLET ORAL DAILY
Status: DISCONTINUED | OUTPATIENT
Start: 2024-03-18 | End: 2024-03-23 | Stop reason: HOSPADM

## 2024-03-18 RX ORDER — FLUTICASONE PROPIONATE 50 MCG
1 SPRAY, SUSPENSION (ML) NASAL 2 TIMES DAILY
Status: DISCONTINUED | OUTPATIENT
Start: 2024-03-18 | End: 2024-03-23 | Stop reason: HOSPADM

## 2024-03-18 RX ORDER — FENOFIBRATE 160 MG/1
160 TABLET ORAL DAILY
Status: DISCONTINUED | OUTPATIENT
Start: 2024-03-18 | End: 2024-03-23 | Stop reason: HOSPADM

## 2024-03-18 RX ORDER — BUPROPION HYDROCHLORIDE 300 MG/1
300 TABLET ORAL EVERY MORNING
Status: DISCONTINUED | OUTPATIENT
Start: 2024-03-19 | End: 2024-03-23 | Stop reason: HOSPADM

## 2024-03-18 RX ORDER — FERROUS SULFATE 325(65) MG
325 TABLET ORAL
Status: DISCONTINUED | OUTPATIENT
Start: 2024-03-18 | End: 2024-03-23 | Stop reason: HOSPADM

## 2024-03-18 RX ORDER — ASPIRIN 81 MG/1
81 TABLET ORAL DAILY
Status: DISCONTINUED | OUTPATIENT
Start: 2024-03-18 | End: 2024-03-23 | Stop reason: HOSPADM

## 2024-03-18 RX ORDER — ONDANSETRON 2 MG/ML
4 INJECTION INTRAMUSCULAR; INTRAVENOUS ONCE
Status: COMPLETED | OUTPATIENT
Start: 2024-03-18 | End: 2024-03-18

## 2024-03-18 RX ORDER — ACETAMINOPHEN 650 MG/1
650 SUPPOSITORY RECTAL EVERY 6 HOURS PRN
Status: DISCONTINUED | OUTPATIENT
Start: 2024-03-18 | End: 2024-03-23 | Stop reason: HOSPADM

## 2024-03-18 RX ORDER — SODIUM CHLORIDE 9 MG/ML
INJECTION, SOLUTION INTRAVENOUS PRN
Status: DISCONTINUED | OUTPATIENT
Start: 2024-03-18 | End: 2024-03-23 | Stop reason: HOSPADM

## 2024-03-18 RX ORDER — SODIUM CHLORIDE 0.9 % (FLUSH) 0.9 %
5-40 SYRINGE (ML) INJECTION EVERY 12 HOURS SCHEDULED
Status: DISCONTINUED | OUTPATIENT
Start: 2024-03-18 | End: 2024-03-23 | Stop reason: HOSPADM

## 2024-03-18 RX ORDER — SODIUM CHLORIDE 0.9 % (FLUSH) 0.9 %
5-40 SYRINGE (ML) INJECTION PRN
Status: DISCONTINUED | OUTPATIENT
Start: 2024-03-18 | End: 2024-03-23 | Stop reason: HOSPADM

## 2024-03-18 RX ORDER — 0.9 % SODIUM CHLORIDE 0.9 %
1000 INTRAVENOUS SOLUTION INTRAVENOUS ONCE
Status: COMPLETED | OUTPATIENT
Start: 2024-03-18 | End: 2024-03-18

## 2024-03-18 RX ORDER — ONDANSETRON 4 MG/1
4 TABLET, ORALLY DISINTEGRATING ORAL EVERY 8 HOURS PRN
Status: DISCONTINUED | OUTPATIENT
Start: 2024-03-18 | End: 2024-03-23 | Stop reason: HOSPADM

## 2024-03-18 RX ORDER — LANOLIN ALCOHOL/MO/W.PET/CERES
3 CREAM (GRAM) TOPICAL NIGHTLY
Status: DISCONTINUED | OUTPATIENT
Start: 2024-03-18 | End: 2024-03-23 | Stop reason: HOSPADM

## 2024-03-18 RX ADMIN — FLUTICASONE PROPIONATE 1 SPRAY: 50 SPRAY, METERED NASAL at 23:05

## 2024-03-18 RX ADMIN — CARVEDILOL 3.12 MG: 3.12 TABLET, FILM COATED ORAL at 23:03

## 2024-03-18 RX ADMIN — SODIUM CHLORIDE 1000 ML: 9 INJECTION, SOLUTION INTRAVENOUS at 17:24

## 2024-03-18 RX ADMIN — ATORVASTATIN CALCIUM 40 MG: 40 TABLET, FILM COATED ORAL at 23:04

## 2024-03-18 RX ADMIN — ONDANSETRON 4 MG: 2 INJECTION INTRAMUSCULAR; INTRAVENOUS at 17:22

## 2024-03-18 RX ADMIN — IOPAMIDOL 80 ML: 755 INJECTION, SOLUTION INTRAVENOUS at 17:42

## 2024-03-18 RX ADMIN — MORPHINE SULFATE 2 MG: 2 INJECTION, SOLUTION INTRAMUSCULAR; INTRAVENOUS at 17:21

## 2024-03-18 RX ADMIN — TRAZODONE HYDROCHLORIDE 200 MG: 100 TABLET ORAL at 23:03

## 2024-03-18 RX ADMIN — MORPHINE SULFATE 4 MG: 4 INJECTION, SOLUTION INTRAMUSCULAR; INTRAVENOUS at 21:36

## 2024-03-18 RX ADMIN — Medication 3 MG: at 23:04

## 2024-03-18 RX ADMIN — QUETIAPINE FUMARATE 600 MG: 400 TABLET ORAL at 23:03

## 2024-03-18 RX ADMIN — SODIUM CHLORIDE, PRESERVATIVE FREE 10 ML: 5 INJECTION INTRAVENOUS at 21:37

## 2024-03-18 ASSESSMENT — ENCOUNTER SYMPTOMS
CHEST TIGHTNESS: 0
RHINORRHEA: 0
SHORTNESS OF BREATH: 0
EYE PAIN: 0
CONSTIPATION: 0
TROUBLE SWALLOWING: 0
DIARRHEA: 0
BLOOD IN STOOL: 0
ABDOMINAL PAIN: 1
ABDOMINAL DISTENTION: 0
APNEA: 0
VOMITING: 0
PHOTOPHOBIA: 0
COUGH: 0
BACK PAIN: 0
COLOR CHANGE: 0
VOICE CHANGE: 0

## 2024-03-18 ASSESSMENT — PAIN - FUNCTIONAL ASSESSMENT
PAIN_FUNCTIONAL_ASSESSMENT: 0-10

## 2024-03-18 ASSESSMENT — PAIN DESCRIPTION - FREQUENCY
FREQUENCY: CONTINUOUS
FREQUENCY: CONTINUOUS

## 2024-03-18 ASSESSMENT — PAIN SCALES - GENERAL
PAINLEVEL_OUTOF10: 9
PAINLEVEL_OUTOF10: 0

## 2024-03-18 ASSESSMENT — PAIN DESCRIPTION - DESCRIPTORS
DESCRIPTORS: ACHING;NAGGING;SORE
DESCRIPTORS: ACHING
DESCRIPTORS: ACHING;NAGGING;SORE

## 2024-03-18 ASSESSMENT — PAIN DESCRIPTION - ORIENTATION
ORIENTATION: LEFT

## 2024-03-18 ASSESSMENT — PAIN DESCRIPTION - LOCATION
LOCATION: ABDOMEN;BACK
LOCATION: BACK
LOCATION: ABDOMEN;BACK

## 2024-03-18 NOTE — ED NOTES
Pt ambulated to and from restroom without complications. Pt was unable to provide urine sample stating \"I could not get it in the cup\". Pt is calm and resting in bed, side rails up x2, call light within reach, respirations unlabored. Pt has no further needs or complaints at this time.

## 2024-03-18 NOTE — ED PROVIDER NOTES
sensory changes   Endocrine:  Denies polyuria or polydypsia   Lymphatic:  Denies swollen glands   Psychiatric:  Denies depression, suicidal ideation or homicidal ideation   All systems negative except as marked.     PHYSICAL EXAM    VITAL SIGNS: BP (!) 113/91   Pulse 60   Temp 97.8 °F (36.6 °C) (Oral)   Resp 16   Wt 68.9 kg (151 lb 14.4 oz)   SpO2 94%   BMI 26.06 kg/m²    Constitutional:  Well developed, Well nourished, No acute distress, Non-toxic appearance.   HENT:  Normocephalic, Atraumatic, Bilateral external ears normal, Oropharynx moist, No oral exudates, Nose normal. Neck- Normal range of motion, No tenderness, Supple, No stridor.   Eyes:  PERRL, EOMI, Conjunctiva normal, No discharge.   Respiratory:  Normal breath sounds, No respiratory distress, No wheezing, No chest tenderness.   Cardiovascular:  Normal heart rate, Normal rhythm, No murmurs, No rubs, No gallops.   GI:  Bowel sounds normal, Soft, left lower quadrant tenderness, No masses, No pulsatile masses.   : External genitalia appear normal, No masses or lesions. No discharge. No CVA tenderness.   Musculoskeletal:  Intact distal pulses, No edema, No tenderness, No cyanosis, No clubbing. Good range of motion in all major joints. No tenderness to palpation or major deformities noted. Back- No tenderness.   Integument:  Warm, Dry, No erythema, No rash.   Lymphatic:  No lymphadenopathy noted.   Neurologic:  Alert & oriented x 3, Normal motor function, Normal sensory function, No focal deficits noted.   Psychiatric:  Affect normal, Judgment normal, Mood normal.     EKG   RADIOLOGY    CT ABDOMEN PELVIS WO CONTRAST Additional Contrast? None   Final Result   1. Previous left retroperitoneal drainage catheter is removed.   2. Reaccumulation and increased size of the left posterior retroperitoneal    fluid collection abutting the lateral margin of the left psoas, likely    from decompression from the left posterior renal subcapsular fluid    collection  going to follow-up in the morning.  Patient will be admitted to the hospitalist for further workup and management.  All the patient's labs and testing reviewed discussed with the admitting and also with the patient    FINAL IMPRESSION    1. Retroperitoneal fluid collection             Juarez Page MD  03/18/24 1915

## 2024-03-19 ENCOUNTER — APPOINTMENT (OUTPATIENT)
Dept: CT IMAGING | Age: 69
End: 2024-03-19
Payer: MEDICARE

## 2024-03-19 PROBLEM — R18.8 RETROPERITONEAL FLUID COLLECTION: Status: ACTIVE | Noted: 2024-03-19

## 2024-03-19 LAB
ANION GAP SERPL CALC-SCNC: 14 MEQ/L (ref 8–16)
BACTERIA: ABNORMAL
BASOPHILS ABSOLUTE: 0 THOU/MM3 (ref 0–0.1)
BASOPHILS NFR BLD AUTO: 0.4 %
BILIRUB UR QL STRIP: NEGATIVE
BUN SERPL-MCNC: 10 MG/DL (ref 7–22)
CALCIUM SERPL-MCNC: 9.9 MG/DL (ref 8.5–10.5)
CASTS #/AREA URNS LPF: ABNORMAL /LPF
CASTS #/AREA URNS LPF: ABNORMAL /LPF
CHARACTER UR: CLEAR
CHARCOAL URNS QL MICRO: ABNORMAL
CHLORIDE SERPL-SCNC: 100 MEQ/L (ref 98–111)
CO2 SERPL-SCNC: 23 MEQ/L (ref 23–33)
COLOR UR: YELLOW
CREAT SERPL-MCNC: 0.7 MG/DL (ref 0.4–1.2)
CRYSTALS URNS QL MICRO: ABNORMAL
DEPRECATED RDW RBC AUTO: 49.9 FL (ref 35–45)
EOSINOPHIL NFR BLD AUTO: 1.2 %
EOSINOPHILS ABSOLUTE: 0.1 THOU/MM3 (ref 0–0.4)
EPITHELIAL CELLS, UA: ABNORMAL /HPF
ERYTHROCYTE [DISTWIDTH] IN BLOOD BY AUTOMATED COUNT: 15.4 % (ref 11.5–14.5)
GFR SERPL CREATININE-BSD FRML MDRD: > 60 ML/MIN/1.73M2
GLUCOSE SERPL-MCNC: 81 MG/DL (ref 70–108)
GLUCOSE UR QL STRIP.AUTO: NEGATIVE MG/DL
HCT VFR BLD AUTO: 31.6 % (ref 37–47)
HGB BLD-MCNC: 9.8 GM/DL (ref 12–16)
HGB UR QL STRIP.AUTO: ABNORMAL
IMM GRANULOCYTES # BLD AUTO: 0.2 THOU/MM3 (ref 0–0.07)
IMM GRANULOCYTES NFR BLD AUTO: 1.7 %
KETONES UR QL STRIP.AUTO: NEGATIVE
LEUKOCYTE ESTERASE UR QL STRIP.AUTO: ABNORMAL
LYMPHOCYTES ABSOLUTE: 1.9 THOU/MM3 (ref 1–4.8)
LYMPHOCYTES NFR BLD AUTO: 16 %
MCH RBC QN AUTO: 27.6 PG (ref 26–33)
MCHC RBC AUTO-ENTMCNC: 31 GM/DL (ref 32.2–35.5)
MCV RBC AUTO: 89 FL (ref 81–99)
MONOCYTES ABSOLUTE: 0.9 THOU/MM3 (ref 0.4–1.3)
MONOCYTES NFR BLD AUTO: 7.7 %
NEUTROPHILS NFR BLD AUTO: 73 %
NITRITE UR QL STRIP.AUTO: POSITIVE
NRBC BLD AUTO-RTO: 0 /100 WBC
PH UR STRIP.AUTO: 6 [PH] (ref 5–9)
PLATELET # BLD AUTO: 477 THOU/MM3 (ref 130–400)
PMV BLD AUTO: 8.6 FL (ref 9.4–12.4)
POTASSIUM SERPL-SCNC: 4.3 MEQ/L (ref 3.5–5.2)
PROT UR STRIP.AUTO-MCNC: NEGATIVE MG/DL
RBC # BLD AUTO: 3.55 MILL/MM3 (ref 4.2–5.4)
RBC #/AREA URNS HPF: ABNORMAL /HPF
RENAL EPI CELLS #/AREA URNS HPF: ABNORMAL /[HPF]
SEGMENTED NEUTROPHILS ABSOLUTE COUNT: 8.5 THOU/MM3 (ref 1.8–7.7)
SODIUM SERPL-SCNC: 137 MEQ/L (ref 135–145)
SPECIFIC GRAVITY UA: 1.01 (ref 1–1.03)
UROBILINOGEN, URINE: 0.2 EU/DL (ref 0–1)
WBC # BLD AUTO: 11.7 THOU/MM3 (ref 4.8–10.8)
WBC #/AREA URNS HPF: ABNORMAL /HPF
YEAST LIKE FUNGI URNS QL MICRO: ABNORMAL

## 2024-03-19 PROCEDURE — 6370000000 HC RX 637 (ALT 250 FOR IP): Performed by: PHYSICIAN ASSISTANT

## 2024-03-19 PROCEDURE — 99221 1ST HOSP IP/OBS SF/LOW 40: CPT | Performed by: UROLOGY

## 2024-03-19 PROCEDURE — 85025 COMPLETE CBC W/AUTO DIFF WBC: CPT

## 2024-03-19 PROCEDURE — 1200000003 HC TELEMETRY R&B

## 2024-03-19 PROCEDURE — 80048 BASIC METABOLIC PNL TOTAL CA: CPT

## 2024-03-19 PROCEDURE — 6360000002 HC RX W HCPCS: Performed by: PHYSICIAN ASSISTANT

## 2024-03-19 PROCEDURE — 87077 CULTURE AEROBIC IDENTIFY: CPT

## 2024-03-19 PROCEDURE — 2580000003 HC RX 258: Performed by: NURSE PRACTITIONER

## 2024-03-19 PROCEDURE — 10140 I&D HMTMA SEROMA/FLUID COLLJ: CPT

## 2024-03-19 PROCEDURE — 1200000000 HC SEMI PRIVATE

## 2024-03-19 PROCEDURE — 87070 CULTURE OTHR SPECIMN AEROBIC: CPT

## 2024-03-19 PROCEDURE — 6360000002 HC RX W HCPCS: Performed by: RADIOLOGY

## 2024-03-19 PROCEDURE — 99232 SBSQ HOSP IP/OBS MODERATE 35: CPT | Performed by: NURSE PRACTITIONER

## 2024-03-19 PROCEDURE — 87186 SC STD MICRODIL/AGAR DIL: CPT

## 2024-03-19 PROCEDURE — 87075 CULTR BACTERIA EXCEPT BLOOD: CPT

## 2024-03-19 PROCEDURE — 0W9H30Z DRAINAGE OF RETROPERITONEUM WITH DRAINAGE DEVICE, PERCUTANEOUS APPROACH: ICD-10-PCS | Performed by: RADIOLOGY

## 2024-03-19 PROCEDURE — 94640 AIRWAY INHALATION TREATMENT: CPT

## 2024-03-19 PROCEDURE — 81001 URINALYSIS AUTO W/SCOPE: CPT

## 2024-03-19 PROCEDURE — 87205 SMEAR GRAM STAIN: CPT

## 2024-03-19 PROCEDURE — 36415 COLL VENOUS BLD VENIPUNCTURE: CPT

## 2024-03-19 PROCEDURE — 2580000003 HC RX 258: Performed by: PHYSICIAN ASSISTANT

## 2024-03-19 RX ORDER — ALBUTEROL SULFATE 90 UG/1
2 AEROSOL, METERED RESPIRATORY (INHALATION)
Status: DISCONTINUED | OUTPATIENT
Start: 2024-03-19 | End: 2024-03-23 | Stop reason: HOSPADM

## 2024-03-19 RX ORDER — MIDAZOLAM HYDROCHLORIDE 1 MG/ML
INJECTION INTRAMUSCULAR; INTRAVENOUS PRN
Status: COMPLETED | OUTPATIENT
Start: 2024-03-19 | End: 2024-03-19

## 2024-03-19 RX ORDER — FENTANYL CITRATE 50 UG/ML
INJECTION, SOLUTION INTRAMUSCULAR; INTRAVENOUS PRN
Status: COMPLETED | OUTPATIENT
Start: 2024-03-19 | End: 2024-03-19

## 2024-03-19 RX ORDER — SODIUM CHLORIDE 9 MG/ML
INJECTION, SOLUTION INTRAVENOUS CONTINUOUS
Status: DISCONTINUED | OUTPATIENT
Start: 2024-03-19 | End: 2024-03-22

## 2024-03-19 RX ADMIN — FENTANYL CITRATE 25 MCG: 50 INJECTION, SOLUTION INTRAMUSCULAR; INTRAVENOUS at 10:41

## 2024-03-19 RX ADMIN — MORPHINE SULFATE 4 MG: 4 INJECTION, SOLUTION INTRAMUSCULAR; INTRAVENOUS at 18:01

## 2024-03-19 RX ADMIN — MORPHINE SULFATE 4 MG: 4 INJECTION, SOLUTION INTRAMUSCULAR; INTRAVENOUS at 05:57

## 2024-03-19 RX ADMIN — ESCITALOPRAM OXALATE 20 MG: 20 TABLET ORAL at 09:21

## 2024-03-19 RX ADMIN — ALBUTEROL SULFATE 2 PUFF: 90 AEROSOL, METERED RESPIRATORY (INHALATION) at 16:58

## 2024-03-19 RX ADMIN — SODIUM CHLORIDE, PRESERVATIVE FREE 10 ML: 5 INJECTION INTRAVENOUS at 21:36

## 2024-03-19 RX ADMIN — BUPROPION HYDROCHLORIDE 300 MG: 300 TABLET, FILM COATED, EXTENDED RELEASE ORAL at 09:21

## 2024-03-19 RX ADMIN — PIPERACILLIN AND TAZOBACTAM 3375 MG: 3; .375 INJECTION, POWDER, LYOPHILIZED, FOR SOLUTION INTRAVENOUS at 09:56

## 2024-03-19 RX ADMIN — ATORVASTATIN CALCIUM 40 MG: 40 TABLET, FILM COATED ORAL at 21:37

## 2024-03-19 RX ADMIN — CARVEDILOL 3.12 MG: 3.12 TABLET, FILM COATED ORAL at 09:21

## 2024-03-19 RX ADMIN — PIPERACILLIN AND TAZOBACTAM 4500 MG: 4; .5 INJECTION, POWDER, FOR SOLUTION INTRAVENOUS at 02:42

## 2024-03-19 RX ADMIN — CARVEDILOL 3.12 MG: 3.12 TABLET, FILM COATED ORAL at 21:36

## 2024-03-19 RX ADMIN — ALBUTEROL SULFATE 2 PUFF: 90 AEROSOL, METERED RESPIRATORY (INHALATION) at 08:37

## 2024-03-19 RX ADMIN — FOLIC ACID 1 MG: 1 TABLET ORAL at 09:21

## 2024-03-19 RX ADMIN — SODIUM CHLORIDE: 9 INJECTION, SOLUTION INTRAVENOUS at 09:03

## 2024-03-19 RX ADMIN — Medication 3 MG: at 21:36

## 2024-03-19 RX ADMIN — FLUTICASONE PROPIONATE 1 SPRAY: 50 SPRAY, METERED NASAL at 09:21

## 2024-03-19 RX ADMIN — TRAZODONE HYDROCHLORIDE 200 MG: 100 TABLET ORAL at 21:36

## 2024-03-19 RX ADMIN — MORPHINE SULFATE 4 MG: 4 INJECTION, SOLUTION INTRAMUSCULAR; INTRAVENOUS at 14:29

## 2024-03-19 RX ADMIN — FENOFIBRATE 160 MG: 160 TABLET ORAL at 09:21

## 2024-03-19 RX ADMIN — MIDAZOLAM 0.5 MG: 1 INJECTION INTRAMUSCULAR; INTRAVENOUS at 10:41

## 2024-03-19 RX ADMIN — QUETIAPINE FUMARATE 600 MG: 400 TABLET ORAL at 21:36

## 2024-03-19 RX ADMIN — PANTOPRAZOLE SODIUM 40 MG: 40 TABLET, DELAYED RELEASE ORAL at 05:58

## 2024-03-19 RX ADMIN — PANTOPRAZOLE SODIUM 40 MG: 40 TABLET, DELAYED RELEASE ORAL at 16:20

## 2024-03-19 RX ADMIN — PIPERACILLIN AND TAZOBACTAM 3375 MG: 3; .375 INJECTION, POWDER, LYOPHILIZED, FOR SOLUTION INTRAVENOUS at 18:03

## 2024-03-19 RX ADMIN — LEVOTHYROXINE SODIUM 75 MCG: 0.07 TABLET ORAL at 05:58

## 2024-03-19 RX ADMIN — MORPHINE SULFATE 4 MG: 4 INJECTION, SOLUTION INTRAMUSCULAR; INTRAVENOUS at 21:37

## 2024-03-19 ASSESSMENT — PAIN SCALES - GENERAL
PAINLEVEL_OUTOF10: 9
PAINLEVEL_OUTOF10: 0
PAINLEVEL_OUTOF10: 8
PAINLEVEL_OUTOF10: 9
PAINLEVEL_OUTOF10: 0
PAINLEVEL_OUTOF10: 8

## 2024-03-19 ASSESSMENT — PAIN DESCRIPTION - LOCATION
LOCATION: ABDOMEN
LOCATION: FLANK
LOCATION: ABDOMEN;BACK
LOCATION: FLANK

## 2024-03-19 ASSESSMENT — PAIN DESCRIPTION - DESCRIPTORS
DESCRIPTORS: ACHING;SORE;NAGGING
DESCRIPTORS: ACHING;SORE
DESCRIPTORS: SORE
DESCRIPTORS: ACHING;NAGGING;SORE

## 2024-03-19 ASSESSMENT — ENCOUNTER SYMPTOMS
EYE PAIN: 0
CONSTIPATION: 0
BACK PAIN: 1
VOMITING: 0
VOICE CHANGE: 0
DIARRHEA: 0
CHOKING: 0
TROUBLE SWALLOWING: 0
RHINORRHEA: 0
ABDOMINAL DISTENTION: 0
BLOOD IN STOOL: 0
COLOR CHANGE: 0
SHORTNESS OF BREATH: 0
COUGH: 0
PHOTOPHOBIA: 0
ABDOMINAL PAIN: 1

## 2024-03-19 ASSESSMENT — PAIN DESCRIPTION - ORIENTATION
ORIENTATION: LEFT

## 2024-03-19 NOTE — CONSULTS
[facility]  [facility]  [address] [facility] [address]  Dept: [phone]  Loc: [phone]  Visit Date: 3/18/2024    Urology Consult Note    Reason for Consult:  recurrent perinephric/retroperitoneal abscess   Requesting Physician:  medicine    History Obtained From:  patient, electronic medical record    Chief Complaint: abd pain    HISTORY OF PRESENT ILLNESS:                The patient is a 68 y.o. female with significant past medical history of see below who presents with abd pain  OV 3/5  Drain replaced 2/9/24 by SUSAN CABEZAS drain output with 5cc. Family reports last emptied on Sunday.   U/a positive nitrites (asymptomatic), PVR 52ml in office today  Patient with history of following:  Kidney stone  Had cystoscopy left ureteroscopy, left holmium laser lithotripsy and stent exchange 1/12/23 by Dr. Lanier.   Nonobstructive nephrolithiasis on the left.     Incontinence  Had interstim removed by anoop  Hx of cystoscopy with bladder botox 200 units by Dr. Lanier on 8/22/2023.   Botox 100 u 4/2022 was only 20 percent helpful  Botox 200 units 12/2022  Past Medical History:        Diagnosis Date    Allergic rhinitis     Arthritis     back, arms, hips    Bipolar 1 disorder (HCC)     Cancer (HCC) 2011    cancerous polyps removed - Dr. Silva    Cataract     Colon polyps     COPD (chronic obstructive pulmonary disease) (HCC) 07/24/2014    Coronary vasospasm (HCC) 08/17/2019    Depression     Diverticulitis of colon     GERD (gastroesophageal reflux disease)     Glaucoma     Hearing loss     Hiatal hernia     History of arterial ischemic stroke 07/20/2019    History of colonoscopy 2002    History of kidney stones     Hx of blood clots 06/17/2014    PE and collar bone area after shoulder surgery    Hyperlipidemia     Hypertension     Liver disease     enlarged liver - damaged with alcohol in past but no cirrhosis per patient    Pneumonia 07/24/2014    Seasonal allergies      10/29/2023 08:32 PM     U/A:    Lab Results   Component Value Date/Time    COLORU YELLOW 03/19/2024 06:34 AM    PHUR 6.0 03/19/2024 06:34 AM    LABCAST NONE SEEN 03/19/2024 06:34 AM    LABCAST NONE SEEN 03/19/2024 06:34 AM    WBCUA 5-9 03/19/2024 06:34 AM    WBCUA 0-2 04/19/2012 10:10 AM    RBCUA 3-5 03/19/2024 06:34 AM    MUCUS THREADS 09/12/2023 12:35 PM    YEAST NONE SEEN 03/19/2024 06:34 AM    BACTERIA MANY 03/19/2024 06:34 AM    CLARITYU  08/11/2020 12:00 AM      Comment:      Hazy    SPECGRAV 1.010 03/19/2024 06:34 AM    LEUKOCYTESUR TRACE 03/19/2024 06:34 AM    UROBILINOGEN 0.2 03/19/2024 06:34 AM    BILIRUBINUR NEGATIVE 03/19/2024 06:34 AM    BILIRUBINUR NEGATIVE 04/19/2012 10:10 AM    BLOODU TRACE 03/19/2024 06:34 AM    GLUCOSEU Negative 03/05/2024 09:11 AM    AMORPHOUS DEBRIS 09/12/2023 12:35 PM       Imaging:   The patient has had a CT Without and With Contrast which I have independently reviewed along with its accompanying report.  The study demonstrates CT abdomen and pelvis without contrast     Comparison: CT/SR - CT ABDOMEN PELVIS W IV CONTRAST - 01/26/2024 11:12 PM   EST     Findings:  Bibasilar subsegmental atelectasis.     Hypodense left hepatic cyst. Gallbladder is absent.  Enlarged spleen at 13.5 craniocaudal dimension with a few calcified   granulomas.  Pancreas and adrenal glands remarkable.     Previous posterior subcapsular fluid collection of the left kidney is   decreased in size, measuring 2.7 x 1.7 cm from 4.7 x 2.7 cm previously.  This is contiguous with a posterior left retroperitoneal fluid collection   abutting the lateral margin of the left psoas, which is increased in size,   measuring 7.3 x 3.2 cm from 3 x 1.5 cm previously.  Previous left retroperitoneal drainage catheter is removed.  Stable nonobstructing stones in the left kidney.  Right kidney is unremarkable. No hydronephrosis.     No bowel obstruction, pneumoperitoneum, or pneumatosis. The appendix is   normal.  Colonic

## 2024-03-19 NOTE — PROGRESS NOTES
Pharmacy Note - Extended Infusion Beta-Lactam Dose Adjustment    Piperacillin/Tazobactam 3375 mg q8h extended infusion ordered for the treatment of Intra-abdominal Infection. Per [facility] Extended Infusion Beta-Lactam Policy, this will be changed to 4500 mg loading dose followed by 3375 mg q8h extended infusion     Estimated Creatinine Clearance: Estimated Creatinine Clearance: 80 mL/min (based on SCr of 0.7 mg/dL).    Dialysis Status, MARILY, CKD: Normal    BMI: Body mass index is 29.37 kg/m².    Rationale for Adjustment: Dose adjusted per [facility] Extended Infusion Policy based on renal function and indication. The above medication is renally eliminated and demonstrates time-dependent effects on bacterial eradication. Extended-infusion dosing strategy aims to enhance microbiologic and clinical efficacy.     Pharmacy will monitor renal function daily and adjust dose as necessary.      Please call with any questions.    Thank you,  Minnie Mora [facility], BCPS, BCGP  3/19/2024     1:07 AM

## 2024-03-19 NOTE — PROGRESS NOTES
1014 Pt arrived to CT for left side retroperitoneal fluid collection drain placement. Procedure explained using teach back method. Pt states understanding.  1020 Dr William into assess patient and explain procedure.  1022 This procedure has been fully reviewed with the patient and written informed consent has been obtained.   1025 Patient assisted to table in prone position. Monitor attached to patient. Comfort ensured.  1035 Pre-procedure images obtained.  1042 Procedure begins.  1055 Procedure complete. 10ml fluid sample obtained and sent to lab.   1056 Post-procedure images obtained.  1058 Stay fix and op-site applied to drain exit site. Jcarlos close bag attached to drain.   1104 Monitor removed. Patient assisted to bed in semi fowlers position. Comfort ensured.  1108 Report called to Sultana PUTNAM on 8A.   1113 Patient transported to 8A in stable condition.

## 2024-03-19 NOTE — H&P
Hospitalist - History & Physical      Patient: Elli Coulter    Unit/Bed:8A-07/007-A  YOB: 1955  MRN: [id number]   Acct: [id number]   PCP: Marc De Oliveira MD      Assessment and Plan:        Retroperitoneal fluid collection:   CT abd pelvis w/out contrast today shows previous left retroperitoneal drainage catheter removed and reaccumulation of left posterior retroperitoneal fluid collection.    WBC 12.9 today.   Morphine and ondansetron for symptomatic management.   Piperacillin-tazobactam for intra-abdominal infection coverage.   Consult IR, plan for IR abscess drainage.   NPO on 3/19 0015.   Chronic iron deficiency anemia:  Hemoglobin 10.2 today.  Continue ferrous sulfate 325 mg and folic acid 1 mg.   HLD:   Continue atorvastatin 40 mg PO, fenofibrate 160 mg PO.  Depression, bipolar 1 disorder:  Continue bupropion 300 mg PO, escitalopram 20 mg PO, quetiapine 600 mg PO.  COPD:  Continue fluticasone.   HTN:   Continue carvedilol 3.125 mg.  Hypothyroidism:   Continue levothyroxine 75 mcg.  GERD:   Continue pantoprazole 40 mg PO BID.    CC:  abdominal pain    HPI: Elli Coulter is a 68 year old female who presents with left lower quadrant abdominal pain that started early today. She says she had cysts in her back on the left-side that were previously drained but unsure the cause of these or when they were drained. She says the abdominal pain has been on and off for a while but worse today. She is urinating normal today and denies hematuria, vomiting, nausea, headache, chest pain.  She has been unable to walk today due to the pain. She has a history of kidney disease. Last bowel movement was earlier today. She says her abdominal pain has not been controlled with the morphine given in ED.     ROS: Review of Systems   Constitutional:  Negative for chills, diaphoresis, fatigue and fever.   HENT:  Negative for ear pain, rhinorrhea, sneezing, tinnitus, trouble swallowing and voice change.  
Or  potassium alternative oral replacement, 40 mEq, PRN   Or  potassium chloride, 10 mEq, PRN  magnesium sulfate, 2,000 mg, PRN  ondansetron, 4 mg, Q8H PRN   Or  ondansetron, 4 mg, Q6H PRN  polyethylene glycol, 17 g, Daily PRN  acetaminophen, 650 mg, Q6H PRN   Or  acetaminophen, 650 mg, Q6H PRN  morphine, 2 mg, Q4H PRN   Or  morphine, 4 mg, Q2H PRN        Labs:   Recent Results (from the past 24 hour(s))   CBC with Auto Differential    Collection Time: 03/18/24  4:32 PM   Result Value Ref Range    WBC 12.9 (H) 4.8 - 10.8 thou/mm3    RBC 3.73 (L) 4.20 - 5.40 mill/mm3    Hemoglobin 10.2 (L) 12.0 - 16.0 gm/dl    Hematocrit 33.6 (L) 37.0 - 47.0 %    MCV 90.1 81.0 - 99.0 fL    MCH 27.3 26.0 - 33.0 pg    MCHC 30.4 (L) 32.2 - 35.5 gm/dl    RDW-CV 15.3 (H) 11.5 - 14.5 %    RDW-SD 50.8 (H) 35.0 - 45.0 fL    Platelets 507 (H) 130 - 400 thou/mm3    MPV 8.7 (L) 9.4 - 12.4 fL    Seg Neutrophils 66.3 %    Lymphocytes 21.5 %    Monocytes 8.2 %    Eosinophils 1.2 %    Basophils 0.5 %    Immature Granulocytes 2.3 %    Segs Absolute 8.6 (H) 1.8 - 7.7 thou/mm3    Lymphocytes Absolute 2.8 1.0 - 4.8 thou/mm3    Monocytes Absolute 1.1 0.4 - 1.3 thou/mm3    Eosinophils Absolute 0.2 0.0 - 0.4 thou/mm3    Basophils Absolute 0.1 0.0 - 0.1 thou/mm3    Immature Grans (Abs) 0.30 (H) 0.00 - 0.07 thou/mm3    nRBC 0 /100 wbc   Comprehensive Metabolic Panel w/ Reflex to MG    Collection Time: 03/18/24  4:32 PM   Result Value Ref Range    Glucose 93 70 - 108 mg/dL    Creatinine 0.7 0.4 - 1.2 mg/dL    BUN 10 7 - 22 mg/dL    Sodium 133 (L) 135 - 145 meq/L    Potassium reflex Magnesium 4.3 3.5 - 5.2 meq/L    Chloride 98 98 - 111 meq/L    CO2 25 23 - 33 meq/L    Calcium 10.5 8.5 - 10.5 mg/dL    AST 23 5 - 40 U/L    Alkaline Phosphatase 102 38 - 126 U/L    Total Protein 7.6 6.1 - 8.0 g/dL    Albumin 3.0 (L) 3.5 - 5.1 g/dL    Total Bilirubin 0.2 (L) 0.3 - 1.2 mg/dL    ALT 20 11 - 66 U/L   Lipase    Collection Time: 03/18/24  4:32 PM   Result Value Ref

## 2024-03-19 NOTE — DISCHARGE INSTR - COC
Continuity of Care Form    Patient Name: Elli Coulter   :  1955  MRN:  [id number]    Admit date:  3/18/2024  Discharge date:  3-23-24    Code Status Order: Full Code   Advance Directives:     Admitting Physician:  No admitting provider for patient encounter.  PCP: Marc De Oliveira MD    Discharging Nurse: khris martínez   Discharging Hospital Unit/Room#: 8A-07/007-A  Discharging Unit Phone Number: [phone]    Emergency Contact:   Extended Emergency Contact Information  Primary Emergency Contact: Bailey Trimble   [facility]  Home Phone: [phone]  Mobile Phone: [phone]  Relation: Brother/Sister  Hearing or visual needs: None  Other needs: None  Preferred language: English   needed? No  Secondary Emergency Contact: [facility] Phone: [phone]  Relation: Other    Past Surgical History:  Past Surgical History:   Procedure Laterality Date    ABDOMEN SURGERY      x4 removed cysts and ovary removed    CARDIAC SURGERY      heart caths, no stents    CARPAL TUNNEL RELEASE Bilateral 2016    CHOLECYSTECTOMY, LAPAROSCOPIC  6/12/15    Dr. Huff    COLONOSCOPY      CT DRAIN SOFT TISSUE ABSCESS  2024    CT DRAIN SOFT TISSUE ABSCESS 2024 [facility] CT SCAN    CYSTOSCOPY N/A 2/10/2022    CYSTOSCOPY URETHRAL IMPLANT INJECTION performed by Sami Lanier MD at [facility] OR    CYSTOSCOPY N/A 2022    Cystoscopy Bladder Botox 200 units Left Stent Placement performed by Sami Lanier MD at [facility] OR    CYSTOSCOPY N/A 2023    CYSTOSCOPY WITH BLADDER BOTOX 200 UNITS performed by Sami Lanier MD at [facility] OR    CYSTOSCOPY W BIOPSY OF BLADDER N/A 2020    CYSTOSCOPY WITH BLADDER BIOPSIES performed by Chris Avila MD at [facility] OR    DILATATION, ESOPHAGUS      EGD      ELBOW SURGERY Right 10/7/2004    Dr. Cronin    ELBOW SURGERY Bilateral 2016    decompression    HYSTERECTOMY (CERVIX STATUS UNKNOWN)      Dr. Manuel HUTCHINSON with [facility]    MANDIBLE FRACTURE  at the Time of Hospital Discharge:   Respiratory Treatments: as needed  Oxygen Therapy:  is not on home oxygen therapy.  Ventilator:    - No ventilator support    Rehab Therapies: Physical Therapy and Occupational Therapy  Weight Bearing Status/Restrictions: No weight bearing restrictions  Other Medical Equipment (for information only, NOT a DME order):  stand by, walker as needed  Other Treatments: m/a    Patient's personal belongings (please select all that are sent with patient):  Clothing shoes    RN SIGNATURE:  Electronically signed by Bhumi Pablo RN on 3/23/24 at 2:08 PM EDT    CASE MANAGEMENT/SOCIAL WORK SECTION    Inpatient Status Date: 03/18/2024    Readmission Risk Assessment Score:  Readmission Risk              Risk of Unplanned Readmission:  59           Discharging to Facility/ Agency   Name: [facility]  Address: [address] [place] [id number]  Phone: [phone]  Fax: [phone]    Dialysis Facility (if applicable)   Name:  Address:  Dialysis Schedule:  Phone:  Fax:    / signature: Electronically signed by REYES Hutson on 3/19/24 at 2:15 PM EDT    PHYSICIAN SECTION    Prognosis: Good    Condition at Discharge: Stable    Rehab Potential (if transferring to Rehab): Good    Recommended Labs or Other Treatments After Discharge: see avs    Physician Certification: I certify the above information and transfer of Elli Coulter  is necessary for the continuing treatment of the diagnosis listed and that she requires Skilled Nursing Facility for greater 30 days.     Update Admission H&P: No change in H&P    PHYSICIAN SIGNATURE:  Electronically signed by Jenny Sherwood MD on 3/23/24 at 12:39 PM EDT

## 2024-03-19 NOTE — PLAN OF CARE
Problem: Discharge Planning  Goal: Discharge to home or other facility with appropriate resources  3/19/2024 1614 by Sultana Dickinson RN  Outcome: Progressing  3/19/2024 0318 by Bette Meraz RN  Outcome: Progressing  Flowsheets (Taken 3/18/2024 2043)  Discharge to home or other facility with appropriate resources: Identify barriers to discharge with patient and caregiver     Problem: Safety - Adult  Goal: Free from fall injury  3/19/2024 1614 by Sultana Dickinson RN  Outcome: Progressing  3/19/2024 0318 by Bette Meraz RN  Outcome: Progressing  Flowsheets (Taken 3/18/2024 2038)  Free From Fall Injury: Instruct family/caregiver on patient safety     Problem: Pain  Goal: Verbalizes/displays adequate comfort level or baseline comfort level  3/19/2024 1614 by Sultana Dickinson RN  Outcome: Progressing  3/19/2024 0318 by Bette Meraz RN  Outcome: Progressing     Problem: Respiratory - Adult  Goal: Clear lung sounds  3/19/2024 0839 by Nazanin Wisdom ABELARDO  Outcome: Progressing  Note: Patient agrees to goals     Problem: Chronic Conditions and Co-morbidities  Goal: Patient's chronic conditions and co-morbidity symptoms are monitored and maintained or improved  Outcome: Progressing     Problem: ABCDS Injury Assessment  Goal: Absence of physical injury  Outcome: Progressing

## 2024-03-19 NOTE — PRE SEDATION
[facility]  Sedation/Analgesia History & Physical    Pt Name: Elli Coulter  MRN: [id number]  YOB: 1955  Provider Performing Procedure: Rosalee William MD, MD  Primary Care Physician: Marc De Oliveira MD    Formulation and discussion of sedation / procedure plans, risks, benefits, side effects and alternatives with patient and/or responsible adult completed.    PRE-PROCEDURE   DNR-CCA/DNR-CC []Yes [x]No  Brief History/Pre-Procedure Diagnosis: left retroperitoneal collection          MEDICAL HISTORY  []CAD/Valve  [x]Liver Disease  [x]Lung Disease []Diabetes  [x]Hypertension []Renal Disease  []Additional information:       has a past medical history of Allergic rhinitis, Arthritis, Bipolar 1 disorder (HCC), Cancer (HCC), Cataract, Colon polyps, COPD (chronic obstructive pulmonary disease) (HCC), Coronary vasospasm (HCC), Depression, Diverticulitis of colon, GERD (gastroesophageal reflux disease), Glaucoma, Hearing loss, Hiatal hernia, History of arterial ischemic stroke, History of colonoscopy, History of kidney stones, Hx of blood clots, Hyperlipidemia, Hypertension, Liver disease, Pneumonia, Seasonal allergies, Sexual problems, Suicidal thoughts, Thyroid disease, Urinary incontinence, Vomiting, Wears dentures, and Wears glasses.    SURGICAL HISTORY   has a past surgical history that includes Mandible fracture surgery (1984); shoulder surgery (2014); Colonoscopy (2011); Dilatation, esophagus; Elbow surgery (Right, 10/7/2004); Rotator cuff repair (2004, 2014); skin biopsy (2006); Cholecystectomy, laparoscopic (6/12/15); Elbow surgery (Bilateral, 2016); Carpal tunnel release (Bilateral, 2016); Hysterectomy (1998); cystoscopy w biopsy of bladder (N/A, 7/13/2020); Stimulator Surgery (N/A, 11/4/2020); Stimulator Surgery (N/A, 11/23/2020); Abdomen surgery; Cystoscopy (N/A, 2/10/2022); Cardiac surgery; Stimulator Surgery (N/A, 4/14/2022); EGD; Cystoscopy (N/A, 12/27/2022); Ureter surgery  (Left, 1/12/2023); Cystoscopy (N/A, 8/22/2023); and CT ABSCESS DRAINAGE SOFT TISSUE (1/11/2024).  Additional information:       ALLERGIES   Allergies as of 03/18/2024    (No Known Allergies)     Additional information:       MEDICATIONS   Coumadin Use Last 5 Days [x]No []Yes  Antiplatelet drug therapy use last 5 days  [x]No [x]Yes  Other anticoagulant use last 5 days  [x]No []Yes    Current Facility-Administered Medications:     [COMPLETED] piperacillin-tazobactam (ZOSYN) 4,500 mg in sodium chloride 0.9 % 100 mL IVPB (mini-bag), 4,500 mg, IntraVENous, Once, Stopped at 03/19/24 0312 **FOLLOWED BY** piperacillin-tazobactam (ZOSYN) 3,375 mg in sodium chloride 0.9 % 50 mL IVPB (mini-bag), 3,375 mg, IntraVENous, Q8H, Melissa Kim PA-C, Last Rate: 12.5 mL/hr at 03/19/24 0956, 3,375 mg at 03/19/24 0956    0.9 % sodium chloride infusion, , IntraVENous, Continuous, Balwinder Patel, APRN - CNP, Last Rate: 75 mL/hr at 03/19/24 0903, New Bag at 03/19/24 0903    albuterol sulfate HFA (PROVENTIL;VENTOLIN;PROAIR) 108 (90 Base) MCG/ACT inhaler 2 puff, 2 puff, Inhalation, BID RT, Melissa Kim PA-C, 2 puff at 03/19/24 0837    albuterol (PROVENTIL) (2.5 MG/3ML) 0.083% nebulizer solution 2.5 mg, 2.5 mg, Nebulization, Q6H PRN, Melissa Kim PA-C    [Held by provider] aspirin EC tablet 81 mg, 81 mg, Oral, Daily, Melissa Kim PA-C    atorvastatin (LIPITOR) tablet 40 mg, 40 mg, Oral, Nightly, Melsisa Kim PA-C, 40 mg at 03/18/24 2304    buPROPion (WELLBUTRIN XL) extended release tablet 300 mg, 300 mg, Oral, QAM, Melissa Kim PA-C, 300 mg at 03/19/24 0921    carvedilol (COREG) tablet 3.125 mg, 3.125 mg, Oral, BID, Melissa Kim PA-C, 3.125 mg at 03/19/24 0921    escitalopram (LEXAPRO) tablet 20 mg, 20 mg, Oral, Daily, Melissa Kim PA-C, 20 mg at 03/19/24 0921    fenofibrate (TRIGLIDE) tablet 160 mg, 160 mg, Oral, Daily, Melissa Kim PA-C, 160 mg at 03/19/24 0921    ferrous sulfate (IRON 325) tablet 325 mg,

## 2024-03-19 NOTE — PLAN OF CARE
Problem: Discharge Planning  Goal: Discharge to home or other facility with appropriate resources  Outcome: Progressing  Flowsheets (Taken 3/18/2024 2043)  Discharge to home or other facility with appropriate resources: Identify barriers to discharge with patient and caregiver     Problem: Safety - Adult  Goal: Free from fall injury  Outcome: Progressing  Flowsheets (Taken 3/18/2024 2038)  Free From Fall Injury: Instruct family/caregiver on patient safety     Problem: Pain  Goal: Verbalizes/displays adequate comfort level or baseline comfort level  Outcome: Progressing

## 2024-03-19 NOTE — PLAN OF CARE
Problem: Respiratory - Adult  Goal: Clear lung sounds  3/19/2024 1702 by Kathia Suarez RCP  Outcome: Progressing  Continue breathing medication to improve aeration of lungs.  Patient mutually agreed on goals.

## 2024-03-19 NOTE — CARE COORDINATION
3/19/24, 11:08 AM EDT      DISCHARGE PLANNING EVALUATION    Elli Coulter  Admitted: 3/18/2024  Hospital Day: 1    Location: 8A-07/007-A Reason for admit: Abdominal pain [R10.9]  Retroperitoneal fluid collection [R18.8]    Past Medical History:   Diagnosis Date    Allergic rhinitis     Arthritis     back, arms, hips    Bipolar 1 disorder (HCC)     Cancer (HCC) 2011    cancerous polyps removed - Dr. Silva    Cataract     Colon polyps     COPD (chronic obstructive pulmonary disease) (HCC) 07/24/2014    Coronary vasospasm (HCC) 08/17/2019    Depression     Diverticulitis of colon     GERD (gastroesophageal reflux disease)     Glaucoma     Hearing loss     Hiatal hernia     History of arterial ischemic stroke 07/20/2019    History of colonoscopy 2002    History of kidney stones     Hx of blood clots 06/17/2014    PE and collar bone area after shoulder surgery    Hyperlipidemia     Hypertension     Liver disease     enlarged liver - damaged with alcohol in past but no cirrhosis per patient    Pneumonia 07/24/2014    Seasonal allergies     sneezing    Sexual problems     Suicidal thoughts     2015 admitted to 4E from [facility]    Thyroid disease     Urinary incontinence     Vomiting     Wears dentures     Wears glasses        Procedure: 3/18 CT Abd/Plv WO: 1. Previous left retroperitoneal drainage catheter is removed.   2. Reaccumulation and increased size of the left posterior retroperitoneal   fluid collection abutting the lateral margin of the left psoas, likely   from decompression from the left posterior renal subcapsular fluid   collection which is mildly decreased in size.   3. Stable nonobstructing left renal stones. No hydronephrosis.   4. Colonic diverticula.   5. Nonspecific mild splenomegaly.   Barriers to Discharge: Admit from ER as inpatient. Came in with LLQ abdominal pain. IR consulted for abscess drainage, culture ordered for fluid. Consult to Urology. UA: trace blood, + nitrite, trace leukocytes. IVF.

## 2024-03-19 NOTE — CARE COORDINATION
3/19/24, 2:22 PM EDT  Discharge Planning Evaluation  Social work consult received, patient from [facility].    Patient/Family preference is to return to [facility].    The patient's current payor source at the facility is Medicaid.   Medicare skilled days available: NA  Medicare does the patient have a three midnight qualifying stay? NA  Insurance precert:  Yes  Spoke with Joanne at the facility.  Patient bed hold: Yes  Anticipated transport plan: Possibly family if niece is off work otherwise ambulette  Patient's Healthcare Decision Maker: Named in Scanned ACP Document        Readmission Risk Low 0-14, Mod 15-19), High > 20: Readmission Risk Score: 31.8    Current PCP: Marc De Oliveira MD  PCP verified by CM? Yes    Patient Orientation: Other (see comment) (Niece answered questions)    Patient Cognition: Other (see comment) (Niece answered questions)  History Provided by: Child/Family    Advance Directives:      Code Status: Full Code   Patient's Primary Decision Maker is: Named in Scanned ACP Document    Primary Decision Maker: ONE,NO - Child - [phone]    Secondary Decision Maker (Active): Bailey Trimble - Brother/Sister - [phone]     Discharge Planning:    Patient lives with: Other (Comment) (ECF) Type of Home: Long-Term Care  Primary Care Giver: Other (Comment) ([facility] Staff)  Patient Support Systems include: Family Members   Current Financial resources: Medicaid, Medicare  Current community resources: ECF/Home Care  Current services prior to admission: Skilled Nursing Facility            Current DME: Wheelchair, Walker            Type of Home Care services:  Nursing Services    ADLS  Prior functional level: Assistance with the following:, Bathing, Dressing, Cooking, Housework, Shopping, Mobility  Current functional level: Assistance with the following:, Bathing, Dressing, Cooking, Housework, Shopping, Mobility    Family can provide assistance at DC: No  Would you like Case Management to discuss the  discharge plan with any other family members/significant others, and if so, who? Yes  Plans to Return to Present Housing: Yes  Other Identified Issues/Barriers to RETURNING to current housing: NA  Potential Assistance needed at discharge: N/A            Potential DME:    Patient expects to discharge to: Skilled nursing facility  Plan for transportation at discharge: Family    Financial  Payor: [facility] MEDICARE / Plan: [employer] DUAL COMPLETE / Product Type: *No Product type* /     Potential assistance Purchasing Medications: No

## 2024-03-19 NOTE — PROGRESS NOTES
Hospitalist Progress Note    Patient:  Elli Coulter      Unit/Bed:8A-07/007-A    YOB: 1955    MRN: [id number]       Acct: [id number]     PCP: Marc De Oliveira MD    Date of Admission: 3/18/2024    Assessment/Plan:    Perinephric hematoma vs retroperitoneal fluid collection, recent left perinephric abscess: Had drain pulled 3/5 with low OP. Repeat CT abd pelvis w/out contrast shows previous left retroperitoneal drainage catheter removed and reaccumulation of left posterior retroperitoneal fluid collection. Piperacillin-tazobactam for intra-abdominal infection coverage. Consult IR, plan for IR abscess drainage. Will consult urology. Start gentle IVF while NPO.  Hyponatremia, resolved.   Leukocytosis. Downward trend.   Chronic iron deficiency anemia. Hemoglobin 9.8. Continue ferrous sulfate 325 mg and folic acid 1 mg.   Thrombocytosis. Likely reactive.   UTI(POA). UA positive nitrite, many bacteria, trace leukocyte esterase and blood. On IV Zosyn.   HLD: Continue atorvastatin 40 mg PO, fenofibrate 160 mg PO  Depression, bipolar 1 disorder: Continue bupropion 300 mg PO, escitalopram 20 mg PO, quetiapine 600 mg PO. stable  COPD. Not in exacerbation. Continue albuterol as needed.   Primary HTN: Continue carvedilol 3.125 mg BID.  Hypothyroidism: Continue levothyroxine 75 mcg daily Monday - Saturday with 150 mcg on Sunday  GERD: Continue pantoprazole 40 mg PO BID    Dispo: discussed case with urology.       Chief Complaint: abdominal pain     Hospital Course:     Elli Coulter is a 68 year old female who presents with left lower quadrant abdominal pain that started early today. She says had cysts in her back on the left-side that were previously drained but unsure the cause of these or when they were drained. She says the abdominal pain has been on and off for a while but worse today. She is urinating normal today and denies hematuria, vomiting, nausea, headache, chest pain. She has been unable  diverticula.   5. Nonspecific mild splenomegaly.      This document has been electronically signed by: John Paul Pisano MD on    03/18/2024 06:04 PM      All CTs at this facility use dose modulation techniques and iterative    reconstructions, and/or weight-based dosing   when appropriate to reduce radiation to a low as reasonably achievable.      CT DRAINAGE HEMATOMA/SEROMA/FLUID COLLECTION    (Results Pending)       Diet: Diet NPO    DVT prophylaxis: [] Lovenox                                 [x] SCDs                                 [] SQ Heparin                                 [] Encourage ambulation           [] Already on Anticoagulation     Disposition:    [x] Home       [] TCU       [] Rehab       [] Psych       [] SNF       [] Long Term Care Facility       [] Other-    Code Status: Full Code      Electronically signed by BA Diaz CNP on 3/19/2024 at 7:58 AM

## 2024-03-19 NOTE — POST SEDATION
Sedation Post Procedure Note    Patient Name: Elli Coulter   YOB: 1955  Room/Bed: [address] [place] [address]  Medical Record Number: [id number]  Date: 3/19/2024   Time: 11:24 AM         Physicians/Assistants: Rosalee William MD, MD    Procedure Performed:  8 Fr abscess drain placement, left retroperitoneum    Post-Sedation Vital Signs:  Vitals:    03/19/24 1122   BP: 110/63   Pulse: 60   Resp: 16   Temp:    SpO2: 90%      Vital signs were reviewed and were stable after the procedure (see flow sheet for vitals)            Post-Sedation Exam: same           Complications: none    Electronically signed by Rosalee William MD on 3/19/2024 at 11:24 AM

## 2024-03-20 LAB
ANION GAP SERPL CALC-SCNC: 14 MEQ/L (ref 8–16)
BASOPHILS ABSOLUTE: 0 THOU/MM3 (ref 0–0.1)
BASOPHILS NFR BLD AUTO: 0.5 %
BUN SERPL-MCNC: 13 MG/DL (ref 7–22)
CALCIUM SERPL-MCNC: 9.7 MG/DL (ref 8.5–10.5)
CHLORIDE SERPL-SCNC: 100 MEQ/L (ref 98–111)
CO2 SERPL-SCNC: 20 MEQ/L (ref 23–33)
CREAT SERPL-MCNC: 0.6 MG/DL (ref 0.4–1.2)
DEPRECATED RDW RBC AUTO: 52.2 FL (ref 35–45)
EOSINOPHIL NFR BLD AUTO: 2.3 %
EOSINOPHILS ABSOLUTE: 0.2 THOU/MM3 (ref 0–0.4)
ERYTHROCYTE [DISTWIDTH] IN BLOOD BY AUTOMATED COUNT: 15.4 % (ref 11.5–14.5)
GFR SERPL CREATININE-BSD FRML MDRD: > 60 ML/MIN/1.73M2
GLUCOSE SERPL-MCNC: 106 MG/DL (ref 70–108)
HCT VFR BLD AUTO: 32 % (ref 37–47)
HGB BLD-MCNC: 9.3 GM/DL (ref 12–16)
IMM GRANULOCYTES # BLD AUTO: 0.17 THOU/MM3 (ref 0–0.07)
IMM GRANULOCYTES NFR BLD AUTO: 2.2 %
LYMPHOCYTES ABSOLUTE: 1.8 THOU/MM3 (ref 1–4.8)
LYMPHOCYTES NFR BLD AUTO: 22.4 %
MCH RBC QN AUTO: 26.9 PG (ref 26–33)
MCHC RBC AUTO-ENTMCNC: 29.1 GM/DL (ref 32.2–35.5)
MCV RBC AUTO: 92.5 FL (ref 81–99)
MONOCYTES ABSOLUTE: 0.7 THOU/MM3 (ref 0.4–1.3)
MONOCYTES NFR BLD AUTO: 9.1 %
NEUTROPHILS NFR BLD AUTO: 63.5 %
NRBC BLD AUTO-RTO: 0 /100 WBC
PLATELET # BLD AUTO: 430 THOU/MM3 (ref 130–400)
PMV BLD AUTO: 8.6 FL (ref 9.4–12.4)
POTASSIUM SERPL-SCNC: 4.1 MEQ/L (ref 3.5–5.2)
RBC # BLD AUTO: 3.46 MILL/MM3 (ref 4.2–5.4)
SEGMENTED NEUTROPHILS ABSOLUTE COUNT: 5 THOU/MM3 (ref 1.8–7.7)
SODIUM SERPL-SCNC: 134 MEQ/L (ref 135–145)
WBC # BLD AUTO: 7.9 THOU/MM3 (ref 4.8–10.8)

## 2024-03-20 PROCEDURE — 6370000000 HC RX 637 (ALT 250 FOR IP): Performed by: PHYSICIAN ASSISTANT

## 2024-03-20 PROCEDURE — 6360000002 HC RX W HCPCS: Performed by: PHYSICIAN ASSISTANT

## 2024-03-20 PROCEDURE — 85025 COMPLETE CBC W/AUTO DIFF WBC: CPT

## 2024-03-20 PROCEDURE — 97166 OT EVAL MOD COMPLEX 45 MIN: CPT

## 2024-03-20 PROCEDURE — 1200000000 HC SEMI PRIVATE

## 2024-03-20 PROCEDURE — 6370000000 HC RX 637 (ALT 250 FOR IP): Performed by: UROLOGY

## 2024-03-20 PROCEDURE — 94640 AIRWAY INHALATION TREATMENT: CPT

## 2024-03-20 PROCEDURE — 1200000003 HC TELEMETRY R&B

## 2024-03-20 PROCEDURE — 2580000003 HC RX 258: Performed by: NURSE PRACTITIONER

## 2024-03-20 PROCEDURE — 99231 SBSQ HOSP IP/OBS SF/LOW 25: CPT | Performed by: UROLOGY

## 2024-03-20 PROCEDURE — 97162 PT EVAL MOD COMPLEX 30 MIN: CPT

## 2024-03-20 PROCEDURE — 2580000003 HC RX 258: Performed by: PHYSICIAN ASSISTANT

## 2024-03-20 PROCEDURE — 36415 COLL VENOUS BLD VENIPUNCTURE: CPT

## 2024-03-20 PROCEDURE — 99232 SBSQ HOSP IP/OBS MODERATE 35: CPT | Performed by: PHYSICIAN ASSISTANT

## 2024-03-20 PROCEDURE — 97530 THERAPEUTIC ACTIVITIES: CPT

## 2024-03-20 PROCEDURE — 94761 N-INVAS EAR/PLS OXIMETRY MLT: CPT

## 2024-03-20 PROCEDURE — 80048 BASIC METABOLIC PNL TOTAL CA: CPT

## 2024-03-20 PROCEDURE — 97535 SELF CARE MNGMENT TRAINING: CPT

## 2024-03-20 RX ORDER — HYDROCODONE BITARTRATE AND ACETAMINOPHEN 5; 325 MG/1; MG/1
2 TABLET ORAL EVERY 4 HOURS PRN
Status: DISCONTINUED | OUTPATIENT
Start: 2024-03-20 | End: 2024-03-23 | Stop reason: HOSPADM

## 2024-03-20 RX ORDER — HYDROCODONE BITARTRATE AND ACETAMINOPHEN 5; 325 MG/1; MG/1
1 TABLET ORAL EVERY 4 HOURS PRN
Status: DISCONTINUED | OUTPATIENT
Start: 2024-03-20 | End: 2024-03-23 | Stop reason: HOSPADM

## 2024-03-20 RX ADMIN — CARVEDILOL 3.12 MG: 3.12 TABLET, FILM COATED ORAL at 08:21

## 2024-03-20 RX ADMIN — LEVOTHYROXINE SODIUM 75 MCG: 0.07 TABLET ORAL at 06:35

## 2024-03-20 RX ADMIN — BUPROPION HYDROCHLORIDE 300 MG: 300 TABLET, FILM COATED, EXTENDED RELEASE ORAL at 08:21

## 2024-03-20 RX ADMIN — PANTOPRAZOLE SODIUM 40 MG: 40 TABLET, DELAYED RELEASE ORAL at 16:16

## 2024-03-20 RX ADMIN — ALBUTEROL SULFATE 2 PUFF: 90 AEROSOL, METERED RESPIRATORY (INHALATION) at 16:57

## 2024-03-20 RX ADMIN — FOLIC ACID 1 MG: 1 TABLET ORAL at 08:23

## 2024-03-20 RX ADMIN — PIPERACILLIN AND TAZOBACTAM 3375 MG: 3; .375 INJECTION, POWDER, LYOPHILIZED, FOR SOLUTION INTRAVENOUS at 16:15

## 2024-03-20 RX ADMIN — PIPERACILLIN AND TAZOBACTAM 3375 MG: 3; .375 INJECTION, POWDER, LYOPHILIZED, FOR SOLUTION INTRAVENOUS at 03:25

## 2024-03-20 RX ADMIN — SODIUM CHLORIDE, PRESERVATIVE FREE 10 ML: 5 INJECTION INTRAVENOUS at 20:59

## 2024-03-20 RX ADMIN — PANTOPRAZOLE SODIUM 40 MG: 40 TABLET, DELAYED RELEASE ORAL at 06:35

## 2024-03-20 RX ADMIN — FLUTICASONE PROPIONATE 1 SPRAY: 50 SPRAY, METERED NASAL at 08:22

## 2024-03-20 RX ADMIN — TRAZODONE HYDROCHLORIDE 200 MG: 100 TABLET ORAL at 20:59

## 2024-03-20 RX ADMIN — HYDROCODONE BITARTRATE AND ACETAMINOPHEN 2 TABLET: 5; 325 TABLET ORAL at 20:57

## 2024-03-20 RX ADMIN — Medication 3 MG: at 20:59

## 2024-03-20 RX ADMIN — SODIUM CHLORIDE: 9 INJECTION, SOLUTION INTRAVENOUS at 10:16

## 2024-03-20 RX ADMIN — HYDROCODONE BITARTRATE AND ACETAMINOPHEN 1 TABLET: 5; 325 TABLET ORAL at 16:16

## 2024-03-20 RX ADMIN — ATORVASTATIN CALCIUM 40 MG: 40 TABLET, FILM COATED ORAL at 20:58

## 2024-03-20 RX ADMIN — QUETIAPINE FUMARATE 600 MG: 400 TABLET ORAL at 20:57

## 2024-03-20 RX ADMIN — ESCITALOPRAM OXALATE 20 MG: 20 TABLET ORAL at 08:20

## 2024-03-20 RX ADMIN — PIPERACILLIN AND TAZOBACTAM 3375 MG: 3; .375 INJECTION, POWDER, LYOPHILIZED, FOR SOLUTION INTRAVENOUS at 10:18

## 2024-03-20 RX ADMIN — ALBUTEROL SULFATE 2 PUFF: 90 AEROSOL, METERED RESPIRATORY (INHALATION) at 07:31

## 2024-03-20 RX ADMIN — CARVEDILOL 3.12 MG: 3.12 TABLET, FILM COATED ORAL at 20:58

## 2024-03-20 RX ADMIN — FENOFIBRATE 160 MG: 160 TABLET ORAL at 08:20

## 2024-03-20 ASSESSMENT — PAIN DESCRIPTION - DESCRIPTORS
DESCRIPTORS: ACHING
DESCRIPTORS: ACHING;DISCOMFORT;SORE

## 2024-03-20 ASSESSMENT — PAIN DESCRIPTION - ORIENTATION
ORIENTATION: LEFT
ORIENTATION: ANTERIOR;LEFT

## 2024-03-20 ASSESSMENT — PAIN SCALES - GENERAL
PAINLEVEL_OUTOF10: 7
PAINLEVEL_OUTOF10: 6
PAINLEVEL_OUTOF10: 0

## 2024-03-20 ASSESSMENT — PAIN DESCRIPTION - ONSET: ONSET: PROGRESSIVE

## 2024-03-20 ASSESSMENT — PAIN DESCRIPTION - LOCATION
LOCATION: ABDOMEN
LOCATION: ABDOMEN

## 2024-03-20 ASSESSMENT — PAIN DESCRIPTION - FREQUENCY: FREQUENCY: INTERMITTENT

## 2024-03-20 ASSESSMENT — PAIN DESCRIPTION - PAIN TYPE: TYPE: SURGICAL PAIN;ACUTE PAIN

## 2024-03-20 NOTE — CARE COORDINATION
3/20/24, 3:22 PM EDT    DISCHARGE PLANNING EVALUATION    Left message with Joanne heredia [facility] regarding starting precert.  Will be ready for discharge 2-3 days.  PT & OT notes completed.

## 2024-03-20 NOTE — PROGRESS NOTES
[facility]  INPATIENT PHYSICAL THERAPY  EVALUATION  STRZ MED SURG 8AB - 8A-07/007-A    Time In: 0848  Time Out: 0915  Timed Code Treatment Minutes: 10 Minutes  Minutes: 27          Date: 3/20/2024  Patient Name: Elli Coulter,  Gender:  female        MRN: [id number]  : 1955  (68 y.o.)      Referring Practitioner: Balwinder Patel APRN - CNP  Diagnosis: Abdominal Pain  Additional Pertinent Hx: Per EMR:Elli Coulter is a 68 year old female who presents with left lower quadrant abdominal pain that started early today. She says had cysts in her back on the left-side that were previously drained but unsure the cause of these or when they were drained. She says the abdominal pain has been on and off for a while but worse today. She is urinating normal today and denies hematuria, vomiting, nausea, headache, chest pain. She has been unable to walk today due to the pain. She has a history of kidney disease. Perinephric hematoma vs retroperitoneal fluid collection, recent left perinephric abscess: Had drain pulled 3/5 with low OP. Repeat CT abd pelvis w/out contrast shows previous left retroperitoneal drainage catheter removed and reaccumulation of left posterior retroperitoneal fluid collection. Piperacillin-tazobactam for intra-abdominal infection coverage. On 3/19 patient CT for left side retroperitoneal fluid collection drain placement.     Restrictions/Precautions:  Restrictions/Precautions: General Precautions  Position Activity Restriction  Other position/activity restrictions: Drain in L abdomen.    Subjective:  Chart Reviewed: Yes  Patient assessed for rehabilitation services?: Yes  Family / Caregiver Present: No  Subjective: RN approved session, patient sitting up in bedside chair and agreeable to session. Finishing up breakfast. Patient questionable historian, no family present during session. Pt slow to answer questions when asked, and sometimes did not even answer them. Pt

## 2024-03-20 NOTE — PROGRESS NOTES
Hospitalist Progress Note    Patient:  Elli Coulter    YOB: 1955  Unit/Bed:8A-07/007-A  Date of Admission: 3/18/2024  Code Status: Full Code      Assessment/Plan:    Perinephric hematoma vs retroperitoneal fluid collection, recent recurrent left perinephric abscess:   Recent perinephric abscess, s/p drain pulled 3/5 with low OP.   Repeat CT abd pelvis w/out contrast (3/18) shows previous left retroperitoneal drainage catheter removed and reaccumulation of left posterior retroperitoneal fluid collection.  Piperacillin-tazobactam (started 3/19)for intra-abdominal infection coverage.   Urology following.   S/p IR guided drain placement left retroperitoneum 3/19. Blood tinged output, 200cc thus far.   Follow final cultures.     UTI (POA): UA positive nitrites, leuks, bacteria. Culture was not sent. On Zosyn     Hyponatremia, resolved: replace per protocol.    Leukocytosis: d/t #1. Trending down.     Chronic iron deficiency anemia: cont ferrous sulfate and folic acid    Thrombocytosis: Likely reactive    Bipolar 1 disorder: Continue home meds    COPD: Continue home inhalers    Essential hypertension: Continue Coreg    Hypothyroidism: Continue home Synthroid    GERD: PPI    LDA: []CVC / []PICC / []Midline / []Prater / [x]Drains / []Mediport / []None  Antibiotics: Zosyn  Steroids: no  Labs (still needed?): [x]Yes / []No  IVF (still needed?): [x]Yes / []No    Level of care: []Step Down / [x]Med-Surg  Bed Status: [x]Inpatient / []Observation  Telemetry: [x]Yes / []No  PT/OT: [x]Yes / []No    DVT Prophylaxis: [] Lovenox / [] Heparin / [] SCDs / [] Already on Systemic Anticoagulation / [x] None     Chief Complaint: abdominal pain    HPI / Hospital Course: Initial HPI: \"Elli Coulter is a 68 year old female who presents with left lower quadrant abdominal pain that started early today. She says had cysts in her back on the left-side that were previously drained but unsure the cause of these or when

## 2024-03-20 NOTE — PLAN OF CARE
Problem: Respiratory - Adult  Goal: Clear lung sounds  Outcome: Progressing   Breath sounds clear, continuing albuterol per protocol.  Patient mutually agreed on goals.

## 2024-03-20 NOTE — PROGRESS NOTES
[facility]  INPATIENT OCCUPATIONAL THERAPY  STRZ MED SURG 8AB  EVALUATION    Time:   Time In: 1222  Time Out: 1238  Timed Code Treatment Minutes: 8 Minutes  Minutes: 16          Date: 3/20/2024  Patient Name: Elli Coulter,   Gender: female      MRN: [id number]  : 1955  (68 y.o.)  Referring Practitioner: Balwinder Patel APRN - CNP  Diagnosis: abdominal pain  Additional Pertinent Hx: Elli Coulter is a 68 year old female who presents with left lower quadrant abdominal pain that started early today. She says she had cysts in her back on the left-side that were previously drained but unsure the cause of these or when they were drained. She says the abdominal pain has been on and off for a while but worse today. . CT abd pelvis w/out contrast today shows previous left retroperitoneal drainage catheter removed and reaccumulation of left posterior retroperitoneal fluid collection. Consult IR, plan for IR abscess drainage    Restrictions/Precautions:  Restrictions/Precautions: General Precautions  Position Activity Restriction  Other position/activity restrictions: Drain in L abdomen.    Subjective  Chart Reviewed: Yes, Orders, Progress Notes, History and Physical  Patient assessed for rehabilitation services?: Yes     Nurse approved OT eval. Pt in bed on OT arrival, drowsy but agreeable to OT eval and OOB activity.     Pain: does not c/o pain     Vitals: Vitals not assessed per clinical judgement, see nursing flowsheet    Social/Functional History:  Type of Home: [facility] ([facility])  Home Layout: One level  Home Access: Level entry  Home Equipment: Walker, rolling, Cane           ADL Assistance: Needs assistance  Ambulation Assistance: Independent  Transfer Assistance: Independent          Additional Comments: Patient having increased difficulty answering PLOF questions. Pt reports she sometimes walks with an AD, unsure if cane or RW. Notes indicate patient is on the

## 2024-03-20 NOTE — PLAN OF CARE
Problem: Discharge Planning  Goal: Discharge to home or other facility with appropriate resources  3/20/2024 0206 by Bette Meraz RN  Outcome: Progressing  Flowsheets (Taken 3/19/2024 2010)  Discharge to home or other facility with appropriate resources: Identify barriers to discharge with patient and caregiver  3/19/2024 1614 by Sultana Dickinson RN  Outcome: Progressing     Problem: Safety - Adult  Goal: Free from fall injury  3/20/2024 0206 by Bette Meraz RN  Outcome: Progressing  Flowsheets (Taken 3/19/2024 2010)  Free From Fall Injury: Instruct family/caregiver on patient safety  3/19/2024 1614 by Sultana Dickinson RN  Outcome: Progressing     Problem: Pain  Goal: Verbalizes/displays adequate comfort level or baseline comfort level  3/20/2024 0206 by Bette Meraz RN  Outcome: Progressing  Flowsheets (Taken 3/19/2024 2010)  Verbalizes/displays adequate comfort level or baseline comfort level: Encourage patient to monitor pain and request assistance  3/19/2024 1614 by Sultana Dickinson RN  Outcome: Progressing     Problem: Respiratory - Adult  Goal: Clear lung sounds  3/19/2024 1702 by Kathia Suarez RCP  Outcome: Progressing     Problem: Chronic Conditions and Co-morbidities  Goal: Patient's chronic conditions and co-morbidity symptoms are monitored and maintained or improved  3/20/2024 0206 by Bette Meraz RN  Outcome: Progressing  Flowsheets (Taken 3/19/2024 2010)  Care Plan - Patient's Chronic Conditions and Co-Morbidity Symptoms are Monitored and Maintained or Improved: Monitor and assess patient's chronic conditions and comorbid symptoms for stability, deterioration, or improvement  3/19/2024 1614 by Sultana Dickinson RN  Outcome: Progressing     Problem: ABCDS Injury Assessment  Goal: Absence of physical injury  3/20/2024 0206 by Bette Meraz RN  Outcome: Progressing  Flowsheets (Taken 3/19/2024 2010)  Absence of Physical Injury: Implement safety measures based on patient  assessment  3/19/2024 1614 by Sultana Dickinson, RN  Outcome: Progressing

## 2024-03-20 NOTE — RT PROTOCOL NOTE
RT Inhaler-Nebulizer Bronchodilator Protocol Note    There is a bronchodilator order in the chart from a provider indicating to follow the RT Bronchodilator Protocol and there is an “Initiate RT Inhaler-Nebulizer Bronchodilator Protocol” order as well (see protocol at bottom of note).    CXR Findings:  No results found.    The findings from the last RT Protocol Assessment were as follows:   History Pulmonary Disease: Chronic pulmonary disease  Respiratory Pattern: Regular pattern and RR 12-20 bpm  Breath Sounds: Slightly diminished and/or crackles  Cough: Strong, spontaneous, non-productive  Indication for Bronchodilator Therapy: Decreased or absent breath sounds  Bronchodilator Assessment Score: 4    Aerosolized bronchodilator medication orders have been revised according to the RT Inhaler-Nebulizer Bronchodilator Protocol below.    Respiratory Therapist to perform RT Therapy Protocol Assessment initially then follow the protocol.  Repeat RT Therapy Protocol Assessment PRN for score 0-3 or on second treatment, BID, and PRN for scores above 3.    No Indications - adjust the frequency to every 6 hours PRN wheezing or bronchospasm, if no treatments needed after 48 hours then discontinue using Per Protocol order mode.     If indication present, adjust the RT bronchodilator orders based on the Bronchodilator Assessment Score as indicated below.  Use Inhaler orders unless patient has one or more of the following: on home nebulizer, not able to hold breath for 10 seconds, is not alert and oriented, cannot activate and use MDI correctly, or respiratory rate 25 breaths per minute or more, then use the equivalent nebulizer order(s) with same Frequency and PRN reasons based on the score.  If a patient is on this medication at home then do not decrease Frequency below that used at home.    0-3 - enter or revise RT bronchodilator order(s) to equivalent RT Bronchodilator order with Frequency of every 4 hours PRN for wheezing or  increased work of breathing using Per Protocol order mode.        4-6 - enter or revise RT Bronchodilator order(s) to two equivalent RT bronchodilator orders with one order with BID Frequency and one order with Frequency of every 4 hours PRN wheezing or increased work of breathing using Per Protocol order mode.        7-10 - enter or revise RT Bronchodilator order(s) to two equivalent RT bronchodilator orders with one order with TID Frequency and one order with Frequency of every 4 hours PRN wheezing or increased work of breathing using Per Protocol order mode.       11-13 - enter or revise RT Bronchodilator order(s) to one equivalent RT bronchodilator order with QID Frequency and an Albuterol order with Frequency of every 4 hours PRN wheezing or increased work of breathing using Per Protocol order mode.      Greater than 13 - enter or revise RT Bronchodilator order(s) to one equivalent RT bronchodilator order with every 4 hours Frequency and an Albuterol order with Frequency of every 2 hours PRN wheezing or increased work of breathing using Per Protocol order mode.     RT to enter RT Home Evaluation for COPD & MDI Assessment order using Per Protocol order mode.    Electronically signed by Dayanara Clark RCP on 3/20/2024 at 7:33 AM

## 2024-03-20 NOTE — PROGRESS NOTES
TRACE*       Physical Exam:  No acute distress. Awake, alert and oriented.  Neck is supple  Regular rate and rhythm. Normal peripheral pulses  No accessory muscles of inspiration. Symmetric chest rise  Abdomen soft, +tender around drain L drain in place  No calf pain. Minimal/no edema in bilateral lower extremities.  Skin is warm, dry  Psych: mood, affect normal    Additional Lab/Culture results:     Imaging Reviewed:     BA Tompkins CNP independently reviewed the images and verified the radiology reports from:    CT DRAINAGE HEMATOMA/SEROMA/FLUID COLLECTION    Result Date: 3/19/2024  CT-GUIDED ABSCESS DRAINAGE PERFORMED BY: Rosalee William M.D. DRAINAGE SITE: Left retroperitoneum APPROACH: Left posterior CATHETER: 8 Bahamian multipurpose drainage catheter.  FLUID OBTAINED: 10 mL tan purulent material SEDATION: Versed 0.5 mg and fentanyl 25 mcg , IV; the patient was sedated for 20 minutes during this procedure and monitored with EKG and pulse ox monitoring devices by a registered nurse. PROCEDURE: Signed informed consent was obtained prior to performing this procedure. The patient was placed on the CT scanner and sedated, as indicated above. ALL CT SCANS AT THIS FACILITY use dose modulation, iterative reconstruction, and/or weight-based dosing when appropriate to reduce radiation dose to as low as reasonably achievable. CT images were initially obtained to determine appropriate puncture site. The skin was marked, prepped, and draped in a sterile fashion. Following local anesthesia with 2% lidocaine and utilizing aseptic technique, a needle was successfully passed into the abscess pocket. A small amount of pus was aspirated to confirm appropriate needle position. A guidewire was then passed through the needle followed by insertion of progressively larger dilators up to an 8 Bahamian size. An 8 Bahamian multi-side-hole drainage catheter was then passed over the wire and was coiled within the abscess pocket. The  retaining suture was then locked in the standard fashion. Catheter was then hooked to a Jcarlos-Close drainage bag and the catheter was flushed several times with sterile saline. The catheter was stabilized to the skin with a Percu-Stay device.     Status post successful abscess drainage procedure.. **This report has been created using voice recognition software.  It may contain minor errors which are inherent in voice recognition technology.** Final report electronically signed by Dr. Rosalee William on 3/19/2024 2:39 PM    CT ABDOMEN PELVIS WO CONTRAST Additional Contrast? None    Result Date: 3/18/2024  CT abdomen and pelvis without contrast Comparison: CT/SR - CT ABDOMEN PELVIS W IV CONTRAST - 01/26/2024 11:12 PM EST Findings: Bibasilar subsegmental atelectasis. Hypodense left hepatic cyst. Gallbladder is absent. Enlarged spleen at 13.5 craniocaudal dimension with a few calcified granulomas. Pancreas and adrenal glands remarkable. Previous posterior subcapsular fluid collection of the left kidney is decreased in size, measuring 2.7 x 1.7 cm from 4.7 x 2.7 cm previously. This is contiguous with a posterior left retroperitoneal fluid collection abutting the lateral margin of the left psoas, which is increased in size, measuring 7.3 x 3.2 cm from 3 x 1.5 cm previously. Previous left retroperitoneal drainage catheter is removed. Stable nonobstructing stones in the left kidney. Right kidney is unremarkable. No hydronephrosis. No bowel obstruction, pneumoperitoneum, or pneumatosis. The appendix is normal. Colonic diverticula without acute diverticulitis. Calcified plaque in the nonaneurysmal aorta and iliac arteries. There are no enlarged abdominal or pelvic lymph nodes. The urinary bladder is unremarkable. Uterus is absent. No free fluid in the pelvis. There are no aggressive osseous lesions.     1. Previous left retroperitoneal drainage catheter is removed. 2. Reaccumulation and increased size of the left posterior

## 2024-03-21 ENCOUNTER — TELEPHONE (OUTPATIENT)
Dept: UROLOGY | Age: 69
End: 2024-03-21

## 2024-03-21 LAB
BACTERIA SPEC AEROBE CULT: ABNORMAL
BACTERIA SPEC ANAEROBE CULT: ABNORMAL
GRAM STN SPEC: ABNORMAL
ORGANISM: ABNORMAL

## 2024-03-21 PROCEDURE — 99231 SBSQ HOSP IP/OBS SF/LOW 25: CPT | Performed by: UROLOGY

## 2024-03-21 PROCEDURE — 94640 AIRWAY INHALATION TREATMENT: CPT

## 2024-03-21 PROCEDURE — 6370000000 HC RX 637 (ALT 250 FOR IP): Performed by: PHYSICIAN ASSISTANT

## 2024-03-21 PROCEDURE — 94761 N-INVAS EAR/PLS OXIMETRY MLT: CPT

## 2024-03-21 PROCEDURE — 2580000003 HC RX 258: Performed by: PHYSICIAN ASSISTANT

## 2024-03-21 PROCEDURE — 97116 GAIT TRAINING THERAPY: CPT

## 2024-03-21 PROCEDURE — 6370000000 HC RX 637 (ALT 250 FOR IP): Performed by: UROLOGY

## 2024-03-21 PROCEDURE — 97110 THERAPEUTIC EXERCISES: CPT

## 2024-03-21 PROCEDURE — 99232 SBSQ HOSP IP/OBS MODERATE 35: CPT | Performed by: PHYSICIAN ASSISTANT

## 2024-03-21 PROCEDURE — 97530 THERAPEUTIC ACTIVITIES: CPT

## 2024-03-21 PROCEDURE — 1200000000 HC SEMI PRIVATE

## 2024-03-21 PROCEDURE — 6360000002 HC RX W HCPCS: Performed by: PHYSICIAN ASSISTANT

## 2024-03-21 PROCEDURE — 97535 SELF CARE MNGMENT TRAINING: CPT

## 2024-03-21 PROCEDURE — 2580000003 HC RX 258: Performed by: NURSE PRACTITIONER

## 2024-03-21 RX ADMIN — ALBUTEROL SULFATE 2 PUFF: 90 AEROSOL, METERED RESPIRATORY (INHALATION) at 18:07

## 2024-03-21 RX ADMIN — SODIUM CHLORIDE: 9 INJECTION, SOLUTION INTRAVENOUS at 17:41

## 2024-03-21 RX ADMIN — FENOFIBRATE 160 MG: 160 TABLET ORAL at 08:06

## 2024-03-21 RX ADMIN — PIPERACILLIN AND TAZOBACTAM 3375 MG: 3; .375 INJECTION, POWDER, LYOPHILIZED, FOR SOLUTION INTRAVENOUS at 10:21

## 2024-03-21 RX ADMIN — ALBUTEROL SULFATE 2 PUFF: 90 AEROSOL, METERED RESPIRATORY (INHALATION) at 09:05

## 2024-03-21 RX ADMIN — CARVEDILOL 3.12 MG: 3.12 TABLET, FILM COATED ORAL at 08:12

## 2024-03-21 RX ADMIN — BUPROPION HYDROCHLORIDE 300 MG: 300 TABLET, FILM COATED, EXTENDED RELEASE ORAL at 08:12

## 2024-03-21 RX ADMIN — PANTOPRAZOLE SODIUM 40 MG: 40 TABLET, DELAYED RELEASE ORAL at 08:06

## 2024-03-21 RX ADMIN — SODIUM CHLORIDE: 9 INJECTION, SOLUTION INTRAVENOUS at 01:10

## 2024-03-21 RX ADMIN — FLUTICASONE PROPIONATE 1 SPRAY: 50 SPRAY, METERED NASAL at 20:24

## 2024-03-21 RX ADMIN — FERROUS SULFATE TAB 325 MG (65 MG ELEMENTAL FE) 325 MG: 325 (65 FE) TAB at 00:00

## 2024-03-21 RX ADMIN — LEVOTHYROXINE SODIUM 75 MCG: 0.07 TABLET ORAL at 08:06

## 2024-03-21 RX ADMIN — ESCITALOPRAM OXALATE 20 MG: 20 TABLET ORAL at 08:06

## 2024-03-21 RX ADMIN — CARVEDILOL 3.12 MG: 3.12 TABLET, FILM COATED ORAL at 20:22

## 2024-03-21 RX ADMIN — HYDROCODONE BITARTRATE AND ACETAMINOPHEN 2 TABLET: 5; 325 TABLET ORAL at 09:10

## 2024-03-21 RX ADMIN — HYDROCODONE BITARTRATE AND ACETAMINOPHEN 2 TABLET: 5; 325 TABLET ORAL at 18:08

## 2024-03-21 RX ADMIN — FOLIC ACID 1 MG: 1 TABLET ORAL at 08:06

## 2024-03-21 RX ADMIN — ATORVASTATIN CALCIUM 40 MG: 40 TABLET, FILM COATED ORAL at 20:22

## 2024-03-21 RX ADMIN — PIPERACILLIN AND TAZOBACTAM 3375 MG: 3; .375 INJECTION, POWDER, LYOPHILIZED, FOR SOLUTION INTRAVENOUS at 02:17

## 2024-03-21 RX ADMIN — FLUTICASONE PROPIONATE 1 SPRAY: 50 SPRAY, METERED NASAL at 08:07

## 2024-03-21 RX ADMIN — Medication 3 MG: at 20:22

## 2024-03-21 RX ADMIN — PIPERACILLIN AND TAZOBACTAM 3375 MG: 3; .375 INJECTION, POWDER, LYOPHILIZED, FOR SOLUTION INTRAVENOUS at 18:06

## 2024-03-21 RX ADMIN — QUETIAPINE FUMARATE 600 MG: 400 TABLET ORAL at 20:22

## 2024-03-21 RX ADMIN — TRAZODONE HYDROCHLORIDE 200 MG: 100 TABLET ORAL at 20:22

## 2024-03-21 RX ADMIN — PANTOPRAZOLE SODIUM 40 MG: 40 TABLET, DELAYED RELEASE ORAL at 16:19

## 2024-03-21 ASSESSMENT — PAIN SCALES - GENERAL
PAINLEVEL_OUTOF10: 0
PAINLEVEL_OUTOF10: 8
PAINLEVEL_OUTOF10: 0
PAINLEVEL_OUTOF10: 7
PAINLEVEL_OUTOF10: 7
PAINLEVEL_OUTOF10: 2
PAINLEVEL_OUTOF10: 0
PAINLEVEL_OUTOF10: 0

## 2024-03-21 ASSESSMENT — PAIN DESCRIPTION - DESCRIPTORS
DESCRIPTORS: ACHING

## 2024-03-21 ASSESSMENT — PAIN DESCRIPTION - ORIENTATION
ORIENTATION: LEFT

## 2024-03-21 ASSESSMENT — PAIN DESCRIPTION - PAIN TYPE: TYPE: SURGICAL PAIN

## 2024-03-21 ASSESSMENT — PAIN DESCRIPTION - LOCATION
LOCATION: FLANK

## 2024-03-21 ASSESSMENT — PAIN DESCRIPTION - ONSET: ONSET: ON-GOING

## 2024-03-21 ASSESSMENT — PAIN DESCRIPTION - FREQUENCY: FREQUENCY: CONTINUOUS

## 2024-03-21 NOTE — CARE COORDINATION
3/21/24, 2:44 PM EDT    DISCHARGE PLANNING EVALUATION    Received message for Joanne Freeman [facility], precert was started today 3/21.

## 2024-03-21 NOTE — CONSULTS
CONSULTATION NOTE :ID       Patient - Elli Coulter,  Age - 68 y.o.    - 1955      Room Number - 8A-07/007-A   MRN -  [id number]   [facility] # - [id number]  Date of Admission -  3/18/2024  3:43 PM  Patient's PCP: Marc De Oliveira MD     Requesting Physician: Dayanara Garcia PA-C    REASON FOR CONSULTATION   Left perinephric abscess  CHIEF COMPLAINT   Left flank pain    HISTORY OF PRESENT ILLNESS       This is a very pleasant 68 y.o. female who was admitted to the hospital with a chief complaints of pain on the left  flank. She is known to me from past hospitalization. She has hx of left kidney stone complicated with perinephric abscess extending to the psoas muscle. Had drains placed before. Treated with multiple courses of antibiotics. She presented with pain on the left flank area. She had CT abdomen and pelvis and was noted to have recurrence of the abscess. She was taken to IR and a drain was placed. Has purulent drainge. Still has left kidney stone. Her urine and the abscess is growing the same orgnism. She is on iv antibiotic    PAST MEDICAL  HISTORY       Past Medical History:   Diagnosis Date    Allergic rhinitis     Arthritis     back, arms, hips    Bipolar 1 disorder (HCC)     Cancer (HCC)     cancerous polyps removed - Dr. Silva    Cataract     Colon polyps     COPD (chronic obstructive pulmonary disease) (HCC) 2014    Coronary vasospasm (HCC) 2019    Depression     Diverticulitis of colon     GERD (gastroesophageal reflux disease)     Glaucoma     Hearing loss     Hiatal hernia     History of arterial ischemic stroke 2019    History of colonoscopy 2002    History of kidney stones     Hx of blood clots 2014    PE and collar bone area after shoulder surgery    Hyperlipidemia     Hypertension     Liver disease     enlarged liver - damaged with alcohol in past but no cirrhosis per patient    Pneumonia 2014    Seasonal allergies

## 2024-03-21 NOTE — PROGRESS NOTES
[facility]  STRZ MED SURG 8AB  Occupational Therapy  Daily Note  Time:   Time In: 1442  Time Out: 1509  Timed Code Treatment Minutes: 27 Minutes  Minutes: 27          Date: 3/21/2024  Patient Name: Elli Coulter,   Gender: female      Room: [facility]07007-A  MRN: [id number]  : 1955  (68 y.o.)  Referring Practitioner: Balwinder Patel APRN - CNP  Diagnosis: abdominal pain  Additional Pertinent Hx: Elli Coulter is a 68 year old female who presents with left lower quadrant abdominal pain that started early today. She says she had cysts in her back on the left-side that were previously drained but unsure the cause of these or when they were drained. She says the abdominal pain has been on and off for a while but worse today. . CT abd pelvis w/out contrast today shows previous left retroperitoneal drainage catheter removed and reaccumulation of left posterior retroperitoneal fluid collection. Consult IR, plan for IR abscess drainage    Restrictions/Precautions:  Restrictions/Precautions: General Precautions  Position Activity Restriction  Other position/activity restrictions: Drain in L abdomen.     Social/Functional History:  Type of Home: Facility ([facility]  Home Layout: One level  Home Access: Level entry  Home Equipment: Walker, rolling, Cane           ADL Assistance: Needs assistance  Ambulation Assistance: Independent  Transfer Assistance: Independent          Additional Comments: Patient having increased difficulty answering PLOF questions. Pt reports she sometimes walks with an AD, unsure if cane or RW. Notes indicate patient is on the skilled side of [facility], but patient unable to recall if she receives therapy. No family present to verify above information, patient unable to complete her thoughts when trying to answer PLOF questions.    SUBJECTIVE: Nurse Connie lyn session, In bed upon arrival, agreeable to OT session, cooperative    PAIN: 0/10:

## 2024-03-21 NOTE — PROGRESS NOTES
were drained. She says the abdominal pain has been on and off for a while but worse today. She is urinating normal today and denies hematuria, vomiting, nausea, headache, chest pain. She has been unable to walk today due to the pain. She has a history of kidney disease. Last bowel movement was earlier today. She says her abdominal pain has not been controlled with the morphine given in ED.  \"     Subjective (past 24 hours): PT in bed, appears more awake today. States her abdominal pain has resolved, she has some discomfort around her drain site. She endorses mild nausea and denies vd, SOB, chest pain. She states her last BM was 2 days ago.  Case discussed with Urology. Consult ID.       Diet: ADULT DIET; Regular; 4 carb choices (60 gm/meal); Low Fat/Low Chol/High Fiber/LETICIA  ROS: Pertinent positives as noted in HPI. All other systems reviewed and negative.    Past medical history, family history, social history and allergies reviewed again and is unchanged since admission.    Exam:  /71   Pulse 55   Temp 97.8 °F (36.6 °C)   Resp 16   Ht 1.626 m (5' 4\")   Wt 73.5 kg (162 lb)   SpO2 96%   BMI 27.81 kg/m²   General:   Pleasant, chronically-ill appearing female, INAD.   HEENT:  normocephalic and atraumatic.  No scleral icterus. PERR.  Neck: supple.  No JVD. No thyromegaly.  Lungs: clear to auscultation.  No retractions  Cardiac: RRR with slight murmur.  Abdomen: soft. Nontender.  Bowel sounds positive.  Extremities:  No clubbing, cyanosis, or edema x 4.    Vasculature: capillary refill < 3 seconds. Palpable LE pulses bilaterally.  Skin:  warm and dry. Perc drain present L flank region  Psych:  Alert and oriented x3.  Affect appropriate  Lymph:  No supraclavicular adenopathy.  Neurologic:  No focal deficit.  No seizures.      Data: (All radiographs, tracings, PFTs, and imaging are personally viewed and interpreted unless otherwise noted)  Labs:   Recent Labs     03/18/24  1632 03/19/24  0540 03/20/24  0539  report has been created using voice recognition software.  It may contain minor errors which are inherent in voice recognition technology.**      Final report electronically signed by Dr. Rosalee William on 3/19/2024 2:39 PM      CT ABDOMEN PELVIS WO CONTRAST Additional Contrast? None   Final Result   1. Previous left retroperitoneal drainage catheter is removed.   2. Reaccumulation and increased size of the left posterior retroperitoneal    fluid collection abutting the lateral margin of the left psoas, likely    from decompression from the left posterior renal subcapsular fluid    collection which is mildly decreased in size.   3. Stable nonobstructing left renal stones. No hydronephrosis.   4. Colonic diverticula.   5. Nonspecific mild splenomegaly.      This document has been electronically signed by: John Paul Pisano MD on    03/18/2024 06:04 PM      All CTs at this facility use dose modulation techniques and iterative    reconstructions, and/or weight-based dosing   when appropriate to reduce radiation to a low as reasonably achievable.        CT DRAINAGE HEMATOMA/SEROMA/FLUID COLLECTION    Result Date: 3/19/2024  CT-GUIDED ABSCESS DRAINAGE PERFORMED BY: Rosalee William M.D. DRAINAGE SITE: Left retroperitoneum APPROACH: Left posterior CATHETER: 8 Burmese multipurpose drainage catheter.  FLUID OBTAINED: 10 mL tan purulent material SEDATION: Versed 0.5 mg and fentanyl 25 mcg , IV; the patient was sedated for 20 minutes during this procedure and monitored with EKG and pulse ox monitoring devices by a registered nurse. PROCEDURE: Signed informed consent was obtained prior to performing this procedure. The patient was placed on the CT scanner and sedated, as indicated above. ALL CT SCANS AT THIS FACILITY use dose modulation, iterative reconstruction, and/or weight-based dosing when appropriate to reduce radiation dose to as low as reasonably achievable. CT images were initially obtained to determine appropriate puncture

## 2024-03-21 NOTE — PLAN OF CARE
Problem: Discharge Planning  Goal: Discharge to home or other facility with appropriate resources  Outcome: Progressing     Problem: Safety - Adult  Goal: Free from fall injury  Outcome: Progressing     Problem: Pain  Goal: Verbalizes/displays adequate comfort level or baseline comfort level  Outcome: Progressing     Problem: Respiratory - Adult  Goal: Clear lung sounds  3/21/2024 0906 by Hellen Thornton RCP  Outcome: Progressing     Problem: Chronic Conditions and Co-morbidities  Goal: Patient's chronic conditions and co-morbidity symptoms are monitored and maintained or improved  Outcome: Progressing     Problem: ABCDS Injury Assessment  Goal: Absence of physical injury  Outcome: Progressing     Problem: Skin/Tissue Integrity  Goal: Absence of new skin breakdown  Description: 1.  Monitor for areas of redness and/or skin breakdown  2.  Assess vascular access sites hourly  3.  Every 4-6 hours minimum:  Change oxygen saturation probe site  4.  Every 4-6 hours:  If on nasal continuous positive airway pressure, respiratory therapy assess nares and determine need for appliance change or resting period.  Outcome: Progressing

## 2024-03-21 NOTE — RT PROTOCOL NOTE
RT Inhaler-Nebulizer Bronchodilator Protocol Note    There is a bronchodilator order in the chart from a provider indicating to follow the RT Bronchodilator Protocol and there is an “Initiate RT Inhaler-Nebulizer Bronchodilator Protocol” order as well (see protocol at bottom of note).    CXR Findings:  No results found.    The findings from the last RT Protocol Assessment were as follows:   History Pulmonary Disease: Chronic pulmonary disease  Respiratory Pattern: Regular pattern and RR 12-20 bpm  Breath Sounds: Slightly diminished and/or crackles  Cough: Strong, spontaneous, non-productive  Indication for Bronchodilator Therapy: Decreased or absent breath sounds  Bronchodilator Assessment Score: 4    Aerosolized bronchodilator medication orders have been revised according to the RT Inhaler-Nebulizer Bronchodilator Protocol below.    Respiratory Therapist to perform RT Therapy Protocol Assessment initially then follow the protocol.  Repeat RT Therapy Protocol Assessment PRN for score 0-3 or on second treatment, BID, and PRN for scores above 3.    No Indications - adjust the frequency to every 6 hours PRN wheezing or bronchospasm, if no treatments needed after 48 hours then discontinue using Per Protocol order mode.     If indication present, adjust the RT bronchodilator orders based on the Bronchodilator Assessment Score as indicated below.  Use Inhaler orders unless patient has one or more of the following: on home nebulizer, not able to hold breath for 10 seconds, is not alert and oriented, cannot activate and use MDI correctly, or respiratory rate 25 breaths per minute or more, then use the equivalent nebulizer order(s) with same Frequency and PRN reasons based on the score.  If a patient is on this medication at home then do not decrease Frequency below that used at home.    0-3 - enter or revise RT bronchodilator order(s) to equivalent RT Bronchodilator order with Frequency of every 4 hours PRN for wheezing or  increased work of breathing using Per Protocol order mode.        4-6 - enter or revise RT Bronchodilator order(s) to two equivalent RT bronchodilator orders with one order with BID Frequency and one order with Frequency of every 4 hours PRN wheezing or increased work of breathing using Per Protocol order mode.        7-10 - enter or revise RT Bronchodilator order(s) to two equivalent RT bronchodilator orders with one order with TID Frequency and one order with Frequency of every 4 hours PRN wheezing or increased work of breathing using Per Protocol order mode.       11-13 - enter or revise RT Bronchodilator order(s) to one equivalent RT bronchodilator order with QID Frequency and an Albuterol order with Frequency of every 4 hours PRN wheezing or increased work of breathing using Per Protocol order mode.      Greater than 13 - enter or revise RT Bronchodilator order(s) to one equivalent RT bronchodilator order with every 4 hours Frequency and an Albuterol order with Frequency of every 2 hours PRN wheezing or increased work of breathing using Per Protocol order mode.     RT to enter RT Home Evaluation for COPD & MDI Assessment order using Per Protocol order mode.    Electronically signed by Hellen Thornton RCP on 3/21/2024 at 9:06 AM

## 2024-03-21 NOTE — PROGRESS NOTES
Troy Rinaldi, BA - CNP  Urology Progress Note    Subjective: Elli Coulter is a 68 y.o. female.    s/p L side retroperitoneal fluid collection drain placement     His/Her current Diet is: ADULT DIET; Regular; 4 carb choices (60 gm/meal); Low Fat/Low Chol/High Fiber/LETICIA.    Since the previous note, the patient reports the following:  No acute issues overnight.  No fevers or chills.  No nausea or vomiting.  No chest pain or shortness of breath.  No calf pain.  Still c/o pain - improved  Tolerating PO Diet.    HGB trending down, cont to hold asa  Cont abx  Fluid culture +proteus, consider ID consult, has seen in past  Drain with blood tinged output 200ml total so far  Cont drain at discharge  She reports improved pain  URO plans to facilitate referral to [facility] for second opinion on fluid collection  Will follow while inpatient      Vitals and Labs:  Patient Vitals for the past 24 hrs:   BP Temp Temp src Pulse Resp SpO2 Weight   03/21/24 0910 -- -- -- -- 16 -- --   03/21/24 0816 128/71 97.8 °F (36.6 °C) -- 55 -- 96 % --   03/21/24 0812 128/71 -- -- 55 -- -- --   03/21/24 0415 (!) 123/59 97.3 °F (36.3 °C) Axillary 61 16 96 % 73.5 kg (162 lb)   03/21/24 0000 (!) 158/92 97.6 °F (36.4 °C) Axillary 60 17 90 % --   03/20/24 2127 -- -- -- -- 17 -- --   03/20/24 2045 (!) 170/73 98.1 °F (36.7 °C) Oral 65 19 90 % --   03/20/24 1616 -- -- -- -- 18 -- --   03/20/24 1515 139/66 98.4 °F (36.9 °C) Oral 64 16 91 % --   03/20/24 1112 102/76 98.3 °F (36.8 °C) Oral 57 18 94 % --       I/O last 3 completed shifts:  In: 1115.5 [P.O.:960; I.V.:2.6; IV Piggyback:152.9]  Out: 1400 [Urine:1300; Drains:100]    Recent Labs     03/18/24  1632 03/19/24  0540 03/20/24  0539   WBC 12.9* 11.7* 7.9   HGB 10.2* 9.8* 9.3*   HCT 33.6* 31.6* 32.0*   MCV 90.1 89.0 92.5   * 477* 430*       Recent Labs     03/18/24  1632 03/19/24  0540 03/20/24  0539   * 137 134*   K 4.3 4.3 4.1   CL 98 100 100   CO2 25 23 20*   BUN 10 10 13   CREATININE  0.7 0.7 0.6         Recent Labs     03/19/24  0634   COLORU YELLOW   PHUR 6.0   LABCAST NONE SEEN  NONE SEEN   WBCUA 5-9   RBCUA 3-5   YEAST NONE SEEN   BACTERIA MANY   SPECGRAV 1.010   LEUKOCYTESUR TRACE*   UROBILINOGEN 0.2   BILIRUBINUR NEGATIVE   BLOODU TRACE*         Physical Exam:  No acute distress. Awake, alert and oriented.  Neck is supple  Regular rate and rhythm. Normal peripheral pulses  No accessory muscles of inspiration. Symmetric chest rise  Abdomen soft, +tender around drain L drain in place  No calf pain. Minimal/no edema in bilateral lower extremities.  Skin is warm, dry  Psych: mood, affect normal    Additional Lab/Culture results:     Imaging Reviewed:     BA Tompkins CNP independently reviewed the images and verified the radiology reports from:    CT DRAINAGE HEMATOMA/SEROMA/FLUID COLLECTION    Result Date: 3/19/2024  CT-GUIDED ABSCESS DRAINAGE PERFORMED BY: Rosalee William M.D. DRAINAGE SITE: Left retroperitoneum APPROACH: Left posterior CATHETER: 8 [place] Islander multipurpose drainage catheter.  FLUID OBTAINED: 10 mL tan purulent material SEDATION: Versed 0.5 mg and fentanyl 25 mcg , IV; the patient was sedated for 20 minutes during this procedure and monitored with EKG and pulse ox monitoring devices by a registered nurse. PROCEDURE: Signed informed consent was obtained prior to performing this procedure. The patient was placed on the CT scanner and sedated, as indicated above. ALL CT SCANS AT THIS FACILITY use dose modulation, iterative reconstruction, and/or weight-based dosing when appropriate to reduce radiation dose to as low as reasonably achievable. CT images were initially obtained to determine appropriate puncture site. The skin was marked, prepped, and draped in a sterile fashion. Following local anesthesia with 2% lidocaine and utilizing aseptic technique, a needle was successfully passed into the abscess pocket. A small amount of pus was aspirated to confirm appropriate needle

## 2024-03-21 NOTE — PROGRESS NOTES
[facility]  INPATIENT PHYSICAL THERAPY  DAILY NOTE  STRZ MED SURG 8AB - 8A-07/007-A    Time In: 0954  Time Out: 1018  Timed Code Treatment Minutes: 24 Minutes  Minutes: 24          Date: 3/21/2024  Patient Name: Elli Coulter,  Gender:  female        MRN: [id number]  : 1955  (68 y.o.)     Referring Practitioner: Balwinder Patel APRN - CNP  Diagnosis: Abdominal Pain  Additional Pertinent Hx: Per EMR:Elli Coulter is a 68 year old female who presents with left lower quadrant abdominal pain that started early today. She says had cysts in her back on the left-side that were previously drained but unsure the cause of these or when they were drained. She says the abdominal pain has been on and off for a while but worse today. She is urinating normal today and denies hematuria, vomiting, nausea, headache, chest pain. She has been unable to walk today due to the pain. She has a history of kidney disease. Perinephric hematoma vs retroperitoneal fluid collection, recent left perinephric abscess: Had drain pulled 3/5 with low OP. Repeat CT abd pelvis w/out contrast shows previous left retroperitoneal drainage catheter removed and reaccumulation of left posterior retroperitoneal fluid collection. Piperacillin-tazobactam for intra-abdominal infection coverage. On 3/19 patient CT for left side retroperitoneal fluid collection drain placement.     Prior Level of Function:  Type of Home: Facility ([facility])  Home Layout: One level  Home Access: Level entry  Home Equipment: Walker, rolling, Cane        ADL Assistance: Needs assistance  Ambulation Assistance: Independent  Transfer Assistance: Independent  Additional Comments: Patient having increased difficulty answering PLOF questions. Pt reports she sometimes walks with an AD, unsure if cane or RW. Notes indicate patient is on the skilled side of [facility], but patient unable to recall if she receives therapy. No family

## 2024-03-21 NOTE — TELEPHONE ENCOUNTER
Hx of recurrent perinephric fluid collection  S/p L works by Yannick 1/23/23  Drain placed with 200ml out  Dr Lorenzo would like to refer to [facility] for second opinion  Please facilitate  Thanks

## 2024-03-21 NOTE — CARE COORDINATION
3/21/24, 2:11 PM EDT    DISCHARGE ON GOING EVALUATION    Elli Coulter       Hospital day: 3  Location: 8A-07/007-A Reason for admit: Abdominal pain [R10.9]  Retroperitoneal fluid collection [R18.8]   Procedure:   3/18 CT A/P: Previous left retroperitoneal drainage catheter is removed. 2. Reaccumulation and increased size of the left posterior retroperitoneal fluid collection abutting the lateral margin of the left psoas, likely from decompression from the left posterior renal subcapsular fluid collection which is mildly decreased in size. 3. Stable nonobstructing left renal stones. No hydronephrosis. 4. Colonic diverticula. 5. Nonspecific mild splenomegaly.  3/19 Abscess Drain Placement: left retroperitoneum- 8Fr.     Barriers to Discharge: Hospitalist and Urology following. ID to see. Recurrent perinephric abscess. Culture +proteus. Drain with 100ml out. IVF@75. Norco prn. IV zosyn q8h.     PCP: Marc De Oliveira MD  Readmission Risk Score: 33.2%  Patient Goals/Plan/Treatment Preferences: Return to [facility]. Pre-cert started this AM.

## 2024-03-22 LAB
ANION GAP SERPL CALC-SCNC: 12 MEQ/L (ref 8–16)
BUN SERPL-MCNC: 12 MG/DL (ref 7–22)
CALCIUM SERPL-MCNC: 9.7 MG/DL (ref 8.5–10.5)
CHLORIDE SERPL-SCNC: 105 MEQ/L (ref 98–111)
CO2 SERPL-SCNC: 24 MEQ/L (ref 23–33)
CREAT SERPL-MCNC: 0.7 MG/DL (ref 0.4–1.2)
DEPRECATED RDW RBC AUTO: 49.5 FL (ref 35–45)
ERYTHROCYTE [DISTWIDTH] IN BLOOD BY AUTOMATED COUNT: 15.3 % (ref 11.5–14.5)
GFR SERPL CREATININE-BSD FRML MDRD: > 60 ML/MIN/1.73M2
GLUCOSE SERPL-MCNC: 100 MG/DL (ref 70–108)
HCT VFR BLD AUTO: 30.4 % (ref 37–47)
HGB BLD-MCNC: 9.2 GM/DL (ref 12–16)
MCH RBC QN AUTO: 27 PG (ref 26–33)
MCHC RBC AUTO-ENTMCNC: 30.3 GM/DL (ref 32.2–35.5)
MCV RBC AUTO: 89.1 FL (ref 81–99)
PLATELET # BLD AUTO: 481 THOU/MM3 (ref 130–400)
PMV BLD AUTO: 8.5 FL (ref 9.4–12.4)
POTASSIUM SERPL-SCNC: 4.3 MEQ/L (ref 3.5–5.2)
RBC # BLD AUTO: 3.41 MILL/MM3 (ref 4.2–5.4)
SODIUM SERPL-SCNC: 141 MEQ/L (ref 135–145)
WBC # BLD AUTO: 5.8 THOU/MM3 (ref 4.8–10.8)

## 2024-03-22 PROCEDURE — 6370000000 HC RX 637 (ALT 250 FOR IP): Performed by: INTERNAL MEDICINE

## 2024-03-22 PROCEDURE — 6370000000 HC RX 637 (ALT 250 FOR IP): Performed by: UROLOGY

## 2024-03-22 PROCEDURE — 6360000002 HC RX W HCPCS: Performed by: PHYSICIAN ASSISTANT

## 2024-03-22 PROCEDURE — 80048 BASIC METABOLIC PNL TOTAL CA: CPT

## 2024-03-22 PROCEDURE — 85027 COMPLETE CBC AUTOMATED: CPT

## 2024-03-22 PROCEDURE — 94761 N-INVAS EAR/PLS OXIMETRY MLT: CPT

## 2024-03-22 PROCEDURE — 6370000000 HC RX 637 (ALT 250 FOR IP): Performed by: PHYSICIAN ASSISTANT

## 2024-03-22 PROCEDURE — 36415 COLL VENOUS BLD VENIPUNCTURE: CPT

## 2024-03-22 PROCEDURE — 2580000003 HC RX 258: Performed by: PHYSICIAN ASSISTANT

## 2024-03-22 PROCEDURE — 1200000000 HC SEMI PRIVATE

## 2024-03-22 PROCEDURE — 94640 AIRWAY INHALATION TREATMENT: CPT

## 2024-03-22 PROCEDURE — 97110 THERAPEUTIC EXERCISES: CPT

## 2024-03-22 PROCEDURE — 99231 SBSQ HOSP IP/OBS SF/LOW 25: CPT | Performed by: UROLOGY

## 2024-03-22 PROCEDURE — 97530 THERAPEUTIC ACTIVITIES: CPT

## 2024-03-22 PROCEDURE — 99232 SBSQ HOSP IP/OBS MODERATE 35: CPT | Performed by: INTERNAL MEDICINE

## 2024-03-22 RX ORDER — LACTULOSE 10 G/15ML
20 SOLUTION ORAL 2 TIMES DAILY
Status: DISPENSED | OUTPATIENT
Start: 2024-03-22 | End: 2024-03-23

## 2024-03-22 RX ADMIN — HYDROCODONE BITARTRATE AND ACETAMINOPHEN 2 TABLET: 5; 325 TABLET ORAL at 08:32

## 2024-03-22 RX ADMIN — ATORVASTATIN CALCIUM 40 MG: 40 TABLET, FILM COATED ORAL at 21:40

## 2024-03-22 RX ADMIN — ESCITALOPRAM OXALATE 20 MG: 20 TABLET ORAL at 08:30

## 2024-03-22 RX ADMIN — CARVEDILOL 3.12 MG: 3.12 TABLET, FILM COATED ORAL at 21:40

## 2024-03-22 RX ADMIN — TRAZODONE HYDROCHLORIDE 200 MG: 100 TABLET ORAL at 21:39

## 2024-03-22 RX ADMIN — LACTULOSE 20 G: 20 SOLUTION ORAL at 15:03

## 2024-03-22 RX ADMIN — FOLIC ACID 1 MG: 1 TABLET ORAL at 08:30

## 2024-03-22 RX ADMIN — SODIUM CHLORIDE, PRESERVATIVE FREE 10 ML: 5 INJECTION INTRAVENOUS at 08:30

## 2024-03-22 RX ADMIN — HYDROCODONE BITARTRATE AND ACETAMINOPHEN 2 TABLET: 5; 325 TABLET ORAL at 00:37

## 2024-03-22 RX ADMIN — FENOFIBRATE 160 MG: 160 TABLET ORAL at 08:30

## 2024-03-22 RX ADMIN — PIPERACILLIN AND TAZOBACTAM 3375 MG: 3; .375 INJECTION, POWDER, LYOPHILIZED, FOR SOLUTION INTRAVENOUS at 03:17

## 2024-03-22 RX ADMIN — FERROUS SULFATE TAB 325 MG (65 MG ELEMENTAL FE) 325 MG: 325 (65 FE) TAB at 21:42

## 2024-03-22 RX ADMIN — PIPERACILLIN AND TAZOBACTAM 3375 MG: 3; .375 INJECTION, POWDER, LYOPHILIZED, FOR SOLUTION INTRAVENOUS at 09:51

## 2024-03-22 RX ADMIN — LEVOTHYROXINE SODIUM 75 MCG: 0.07 TABLET ORAL at 05:20

## 2024-03-22 RX ADMIN — CARVEDILOL 3.12 MG: 3.12 TABLET, FILM COATED ORAL at 08:30

## 2024-03-22 RX ADMIN — HYDROCODONE BITARTRATE AND ACETAMINOPHEN 2 TABLET: 5; 325 TABLET ORAL at 23:31

## 2024-03-22 RX ADMIN — QUETIAPINE FUMARATE 600 MG: 400 TABLET ORAL at 21:39

## 2024-03-22 RX ADMIN — PIPERACILLIN AND TAZOBACTAM 3375 MG: 3; .375 INJECTION, POWDER, LYOPHILIZED, FOR SOLUTION INTRAVENOUS at 18:09

## 2024-03-22 RX ADMIN — ALBUTEROL SULFATE 2 PUFF: 90 AEROSOL, METERED RESPIRATORY (INHALATION) at 07:56

## 2024-03-22 RX ADMIN — Medication 3 MG: at 21:39

## 2024-03-22 RX ADMIN — FLUTICASONE PROPIONATE 1 SPRAY: 50 SPRAY, METERED NASAL at 08:30

## 2024-03-22 RX ADMIN — PANTOPRAZOLE SODIUM 40 MG: 40 TABLET, DELAYED RELEASE ORAL at 15:05

## 2024-03-22 RX ADMIN — PANTOPRAZOLE SODIUM 40 MG: 40 TABLET, DELAYED RELEASE ORAL at 05:21

## 2024-03-22 RX ADMIN — BUPROPION HYDROCHLORIDE 300 MG: 300 TABLET, FILM COATED, EXTENDED RELEASE ORAL at 08:30

## 2024-03-22 ASSESSMENT — PAIN SCALES - GENERAL
PAINLEVEL_OUTOF10: 0
PAINLEVEL_OUTOF10: 7
PAINLEVEL_OUTOF10: 7
PAINLEVEL_OUTOF10: 0
PAINLEVEL_OUTOF10: 8
PAINLEVEL_OUTOF10: 7

## 2024-03-22 ASSESSMENT — PAIN DESCRIPTION - PAIN TYPE: TYPE: SURGICAL PAIN

## 2024-03-22 ASSESSMENT — PAIN DESCRIPTION - LOCATION
LOCATION: FLANK
LOCATION: FLANK

## 2024-03-22 ASSESSMENT — PAIN DESCRIPTION - ORIENTATION
ORIENTATION: LEFT
ORIENTATION: LEFT

## 2024-03-22 ASSESSMENT — PAIN - FUNCTIONAL ASSESSMENT: PAIN_FUNCTIONAL_ASSESSMENT: ACTIVITIES ARE NOT PREVENTED

## 2024-03-22 ASSESSMENT — PAIN DESCRIPTION - DESCRIPTORS: DESCRIPTORS: ACHING

## 2024-03-22 NOTE — PLAN OF CARE
Problem: Discharge Planning  Goal: Discharge to home or other facility with appropriate resources  3/22/2024 0725 by Hai Payne RN  Outcome: Progressing  3/22/2024 0057 by Brooklyn Pace RN  Outcome: Progressing     Problem: Safety - Adult  Goal: Free from fall injury  3/22/2024 0725 by Hai Payne RN  Outcome: Progressing  3/22/2024 0057 by Brooklyn Pace RN  Outcome: Progressing     Problem: Pain  Goal: Verbalizes/displays adequate comfort level or baseline comfort level  3/22/2024 0725 by Hai Payne RN  Outcome: Progressing  3/22/2024 0057 by Brooklyn Pace RN  Outcome: Progressing     Problem: Chronic Conditions and Co-morbidities  Goal: Patient's chronic conditions and co-morbidity symptoms are monitored and maintained or improved  3/22/2024 0725 by Hai Payne RN  Outcome: Progressing  3/22/2024 0057 by Brooklyn Pace RN  Outcome: Progressing  Flowsheets (Taken 3/21/2024 2017)  Care Plan - Patient's Chronic Conditions and Co-Morbidity Symptoms are Monitored and Maintained or Improved: Monitor and assess patient's chronic conditions and comorbid symptoms for stability, deterioration, or improvement     Problem: ABCDS Injury Assessment  Goal: Absence of physical injury  3/22/2024 0057 by Brooklyn Pace RN  Outcome: Progressing     Problem: Skin/Tissue Integrity  Goal: Absence of new skin breakdown  Description: 1.  Monitor for areas of redness and/or skin breakdown  2.  Assess vascular access sites hourly  3.  Every 4-6 hours minimum:  Change oxygen saturation probe site  4.  Every 4-6 hours:  If on nasal continuous positive airway pressure, respiratory therapy assess nares and determine need for appliance change or resting period.  3/22/2024 0057 by Brooklyn Pace RN  Outcome: Progressing

## 2024-03-22 NOTE — PROGRESS NOTES
\"BILIRUBINUR\", \"BLOODU\" in the last 72 hours.    Invalid input(s): \"NITRATE\", \"GLUCOSEUKETONESUAMORPHOUS\"      Physical Exam:  No acute distress. Awake, alert and oriented.  Neck is supple  Regular rate and rhythm. Normal peripheral pulses  No accessory muscles of inspiration. Symmetric chest rise  Abdomen soft, +tender around drain L drain in place  No calf pain. Minimal/no edema in bilateral lower extremities.  Skin is warm, dry  Psych: mood, affect normal    Additional Lab/Culture results:     Imaging Reviewed:     BA Tompkins CNP independently reviewed the images and verified the radiology reports from:    CT DRAINAGE HEMATOMA/SEROMA/FLUID COLLECTION    Result Date: 3/19/2024  CT-GUIDED ABSCESS DRAINAGE PERFORMED BY: Rosalee William M.D. DRAINAGE SITE: Left retroperitoneum APPROACH: Left posterior CATHETER: 8 Macedonian multipurpose drainage catheter.  FLUID OBTAINED: 10 mL tan purulent material SEDATION: Versed 0.5 mg and fentanyl 25 mcg , IV; the patient was sedated for 20 minutes during this procedure and monitored with EKG and pulse ox monitoring devices by a registered nurse. PROCEDURE: Signed informed consent was obtained prior to performing this procedure. The patient was placed on the CT scanner and sedated, as indicated above. ALL CT SCANS AT THIS FACILITY use dose modulation, iterative reconstruction, and/or weight-based dosing when appropriate to reduce radiation dose to as low as reasonably achievable. CT images were initially obtained to determine appropriate puncture site. The skin was marked, prepped, and draped in a sterile fashion. Following local anesthesia with 2% lidocaine and utilizing aseptic technique, a needle was successfully passed into the abscess pocket. A small amount of pus was aspirated to confirm appropriate needle position. A guidewire was then passed through the needle followed by insertion of progressively larger dilators up to an 8 Macedonian size. An 8 Macedonian multi-side-hole  drainage catheter is removed. 2. Reaccumulation and increased size of the left posterior retroperitoneal fluid collection abutting the lateral margin of the left psoas, likely from decompression from the left posterior renal subcapsular fluid collection which is mildly decreased in size. 3. Stable nonobstructing left renal stones. No hydronephrosis. 4. Colonic diverticula. 5. Nonspecific mild splenomegaly. This document has been electronically signed by: John Paul Pisano MD on 03/18/2024 06:04 PM All CTs at this facility use dose modulation techniques and iterative reconstructions, and/or weight-based dosing when appropriate to reduce radiation to a low as reasonably achievable.      Impression:    Patient Active Problem List   Diagnosis    GERD (gastroesophageal reflux disease)    Colon polyps    History of pulmonary embolism    COPD (chronic obstructive pulmonary disease) ([facility])    Cannabis abuse    Cocaine abuse (HCC)    Alcohol dependence in remission ([facility])    GI bleed    Erosive esophagitis    HTN (hypertension), benign    Bipolar 1 disorder ([facility])    Chronic pain    Hiatal hernia    Erosive gastritis    H pylori ulcer    History of Helicobacter pylori infection    Hyponatremia    Essential hypertension    Hypothyroidism    History of DVT (deep vein thrombosis)    Depression    Dyslipidemia    Chronic heart failure with preserved ejection fraction (HFpEF) (HCC)    Tobacco use disorder    Asymmetrical sensorineural hearing loss    Tinnitus, left ear    Substance-induced psychotic disorder (HCC)    Polysubstance abuse (HCC)    Right arm weakness    Paresthesias    Depression with anxiety    Amnesia    Xgkunfmlf-Kfsxtgu-Mpvbcv disease    Change of, skin texture    Chronic midline low back pain without sciatica    Coronary vasospasm (HCC)    Derangement of knee    Disorder associated with Helicobacter species    Disturbance of skin sensation    Endometriosis    Glaucoma suspect    History of arterial ischemic stroke

## 2024-03-22 NOTE — PLAN OF CARE
Problem: Chronic Conditions and Co-morbidities  Goal: Patient's chronic conditions and co-morbidity symptoms are monitored and maintained or improved  3/22/2024 0057 by Brooklyn Pace RN  Outcome: Progressing  Flowsheets (Taken 3/21/2024 2017)  Care Plan - Patient's Chronic Conditions and Co-Morbidity Symptoms are Monitored and Maintained or Improved: Monitor and assess patient's chronic conditions and comorbid symptoms for stability, deterioration, or improvement  3/21/2024 1545 by Connie Teixeira, RN  Outcome: Progressing     Problem: Skin/Tissue Integrity  Goal: Absence of new skin breakdown  Description: 1.  Monitor for areas of redness and/or skin breakdown  2.  Assess vascular access sites hourly  3.  Every 4-6 hours minimum:  Change oxygen saturation probe site  4.  Every 4-6 hours:  If on nasal continuous positive airway pressure, respiratory therapy assess nares and determine need for appliance change or resting period.  3/22/2024 0057 by Brooklyn Pace, RN  Outcome: Progressing  3/21/2024 1545 by Connie Teixeira RN  Outcome: Progressing     Problem: Pain  Goal: Verbalizes/displays adequate comfort level or baseline comfort level  3/22/2024 0057 by Brooklyn Pace, RN  Outcome: Progressing  3/21/2024 1545 by Connie Teixeira RN  Outcome: Progressing

## 2024-03-22 NOTE — PROGRESS NOTES
[facility]  STRZ MED SURG 8AB  Occupational Therapy  Daily Note  Time:   Time In: 1139  Time Out: 1151  Timed Code Treatment Minutes: 12 Minutes  Minutes: 12          Date: 3/22/2024  Patient Name: Elli Coulter,   Gender: female      Room: [facility]07/007-A  MRN: [id number]  : 1955  (68 y.o.)  Referring Practitioner: Balwinder Patel APRN - CNP  Diagnosis: abdominal pain  Additional Pertinent Hx: Elli Coulter is a 68 year old female who presents with left lower quadrant abdominal pain that started early today. She says she had cysts in her back on the left-side that were previously drained but unsure the cause of these or when they were drained. She says the abdominal pain has been on and off for a while but worse today. . CT abd pelvis w/out contrast today shows previous left retroperitoneal drainage catheter removed and reaccumulation of left posterior retroperitoneal fluid collection. Consult IR, plan for IR abscess drainage    Restrictions/Precautions:  Restrictions/Precautions: General Precautions  Position Activity Restriction  Other position/activity restrictions: Drain in L abdomen.     Social/Functional History:  Type of Home: Facility ([facility])  Home Layout: One level  Home Access: Level entry  Home Equipment: Walker, rolling, Cane           ADL Assistance: Needs assistance  Ambulation Assistance: Independent  Transfer Assistance: Independent          Additional Comments: Patient having increased difficulty answering PLOF questions. Pt reports she sometimes walks with an AD, unsure if cane or RW. Notes indicate patient is on the skilled side of [facility], but patient unable to recall if she receives therapy. No family present to verify above information, patient unable to complete her thoughts when trying to answer PLOF questions.    SUBJECTIVE: Nurse Say ok'd session, In bed upon arrival, encouragement given for session    PAIN: 8/10: L side    Vitals:  Vitals not assessed per clinical judgement, see nursing flowsheet    COGNITION: Slow Processing      ADDITIONAL ACTIVITIES:  Guided patient through BUE AROM this date x10 reps x1 set in supine in order to increase BUE strength and improve activity tolerance for ADLs and homemaking tasks.          Modified Storey Scale:  Not Applicable    ASSESSMENT:     Activity Tolerance:  Patient tolerance of  treatment: Fair treatment tolerance self limiting      Discharge Recommendations: Inpatient Therapy Stay  Equipment Recommendations: Equipment Needed: No  Plan: Times Per Week: 3-5x  Times Per Day: Once a day  Current Treatment Recommendations: Strengthening, Functional mobility training, Balance training, Endurance training, Self-Care / ADL, Safety education & training    Education:  Learners: Patient  Home Exercise Program    Goals  Short Term Goals  Time Frame for Short Term Goals: until discharge  Short Term Goal 1: Pt will navigate to/from bathroom and HH distances with SBA and minimal cues for safety awareness and safe walker management to improve indep with ADL routines.  Short Term Goal 2: Pt will tolerance x5 min standing with 1-2 UE release at SBA [facility] and activity tolerance required for sinkside ADLS  Short Term Goal 3: Pt will complete toileting routine with Supervision and no additional cues for safety to improve indep with ADL routines.  Short Term Goal 4: Pt will complete UB/LB dressing and bathing with SBA and minimal cues for problem solving and sequencing to improve indep with self care tasks.    Following session, patient left in safe position with all fall risk precautions in place.

## 2024-03-22 NOTE — CARE COORDINATION
3/22/24, 3:15 PM EDT    DISCHARGE PLANNING EVALUATION    Joanne called from [facility], precert approved.  Patient will not be on IV antibiotics.  Ambulette transport scheduled for 3:30 tomorrow 3/23.    3/22/24, 3:16 PM EDT    Patient goals/plan/ treatment preferences discussed by  and .  Patient goals/plan/ treatment preferences reviewed with patient/ family.  Patient/ family verbalize understanding of discharge plan and are in agreement with goal/plan/treatment preferences.  Understanding was demonstrated using the teach back method.  AVS provided by RN at time of discharge, which includes all necessary medical information pertaining to the patients current course of illness, treatment, post-discharge goals of care, and treatment preferences.     Services At/After Discharge: Skilled Nursing Facility (SNF), Aide services, In ambulette, Nursing service, OT, and PT       IMM Letter  IMM Letter given to Patient/Family/Significant other/Guardian/POA/by:: Hellen PUTNAM   IMM Letter date given:: 03/22/24  IMM Letter time given:: 0835     Anticipate discharge tomorrow tor return [facility] skilled medicare bed. Joanne at [facility] aware of discharge 3/23 with 3:30 transport.  Discharge instructions and blue packet on chart.

## 2024-03-22 NOTE — RT PROTOCOL NOTE
RT Inhaler-Nebulizer Bronchodilator Protocol Note    There is a bronchodilator order in the chart from a provider indicating to follow the RT Bronchodilator Protocol and there is an “Initiate RT Inhaler-Nebulizer Bronchodilator Protocol” order as well (see protocol at bottom of note).    CXR Findings:  No results found.    The findings from the last RT Protocol Assessment were as follows:   History Pulmonary Disease: Chronic pulmonary disease  Respiratory Pattern: Regular pattern and RR 12-20 bpm  Breath Sounds: Slightly diminished and/or crackles  Cough: Strong, spontaneous, non-productive  Indication for Bronchodilator Therapy: Decreased or absent breath sounds  Bronchodilator Assessment Score: 4    Aerosolized bronchodilator medication orders have been revised according to the RT Inhaler-Nebulizer Bronchodilator Protocol below.    Respiratory Therapist to perform RT Therapy Protocol Assessment initially then follow the protocol.  Repeat RT Therapy Protocol Assessment PRN for score 0-3 or on second treatment, BID, and PRN for scores above 3.    No Indications - adjust the frequency to every 6 hours PRN wheezing or bronchospasm, if no treatments needed after 48 hours then discontinue using Per Protocol order mode.     If indication present, adjust the RT bronchodilator orders based on the Bronchodilator Assessment Score as indicated below.  Use Inhaler orders unless patient has one or more of the following: on home nebulizer, not able to hold breath for 10 seconds, is not alert and oriented, cannot activate and use MDI correctly, or respiratory rate 25 breaths per minute or more, then use the equivalent nebulizer order(s) with same Frequency and PRN reasons based on the score.  If a patient is on this medication at home then do not decrease Frequency below that used at home.    0-3 - enter or revise RT bronchodilator order(s) to equivalent RT Bronchodilator order with Frequency of every 4 hours PRN for wheezing or  increased work of breathing using Per Protocol order mode.        4-6 - enter or revise RT Bronchodilator order(s) to two equivalent RT bronchodilator orders with one order with BID Frequency and one order with Frequency of every 4 hours PRN wheezing or increased work of breathing using Per Protocol order mode.        7-10 - enter or revise RT Bronchodilator order(s) to two equivalent RT bronchodilator orders with one order with TID Frequency and one order with Frequency of every 4 hours PRN wheezing or increased work of breathing using Per Protocol order mode.       11-13 - enter or revise RT Bronchodilator order(s) to one equivalent RT bronchodilator order with QID Frequency and an Albuterol order with Frequency of every 4 hours PRN wheezing or increased work of breathing using Per Protocol order mode.      Greater than 13 - enter or revise RT Bronchodilator order(s) to one equivalent RT bronchodilator order with every 4 hours Frequency and an Albuterol order with Frequency of every 2 hours PRN wheezing or increased work of breathing using Per Protocol order mode.     RT to enter RT Home Evaluation for COPD & MDI Assessment order using Per Protocol order mode.    Electronically signed by Deisi Hopper RCP on 3/22/2024 at 5:06 PM

## 2024-03-22 NOTE — PROGRESS NOTES
Hospitalist Progress Note    Patient:  Elli Coulter    YOB: 1955  Unit/Bed:8A-07/007-A  Date of Admission: 3/18/2024  Code Status: Full Code      Assessment/Plan:    Recurrent left perinephric abscess:   Recent perinephric abscess, s/p drain pulled 3/5 with low OP. This is the 3rd reoccurrence.   Repeat CT abd pelvis w/out contrast (3/18) shows previous left retroperitoneal drainage catheter removed and reaccumulation of left posterior retroperitoneal fluid collection.  Piperacillin-tazobactam (started 3/19) for intra-abdominal infection coverage.   Urology following.   S/p IR guided drain placement left retroperitoneum 3/19. Blood tinged output, approx 200cc thus far.   Culture (+) Proteus.   Consult ID.   3/22-ID seen currently on IV Zosyn, stop IV fluids    UTI (POA): UA positive nitrites, leuks, bacteria. Culture was not sent. On Zosyn     Acute hypovolemic hyponatremia: Resolved    Leukocytosis: d/t #1. Resolved.     Chronic iron deficiency anemia: cont ferrous sulfate and folic acid    Thrombocytosis: Likely reactive, trending down.     Bipolar 1 disorder: Continue home meds    COPD: Continue home inhalers    Essential hypertension: Continue home Coreg    Hypothyroidism: Continue home Synthroid    GERD: PPI  Constipation: added lactulose for constipation    LDA: []CVC / []PICC / []Midline / []Prater / [x]Drains / []Mediport / []None  Antibiotics: Zosyn  Steroids: no  Labs (still needed?): [x]Yes / []No  IVF (still needed?): [x]Yes / []No    Level of care: []Step Down / [x]Med-Surg  Bed Status: [x]Inpatient / []Observation  Telemetry: [x]Yes / []No  PT/OT: [x]Yes / []No    DVT Prophylaxis: [] Lovenox / [] Heparin / [x] SCDs / [] Already on Systemic Anticoagulation / [] None     Chief Complaint: abdominal pain    HPI / Hospital Course: Initial HPI: \"Elli Coulter is a 68 year old female who presents with left lower quadrant abdominal pain that started early today. She says had  reduce radiation dose to as low as reasonably achievable. CT images were initially obtained to determine appropriate puncture site. The skin was marked, prepped, and draped in a sterile fashion. Following local anesthesia with 2% lidocaine and utilizing aseptic technique, a needle was successfully passed into the abscess pocket. A small amount of pus was aspirated to confirm appropriate needle position. A guidewire was then passed through the needle followed by insertion of progressively larger dilators up to an 8 Danish size. An 8 Danish multi-side-hole drainage catheter was then passed over the wire and was coiled within the abscess pocket. The retaining suture was then locked in the standard fashion. Catheter was then hooked to a Jcarlos-Close drainage bag and the catheter was flushed several times with sterile saline. The catheter was stabilized to the skin with a Percu-Stay device.     Status post successful abscess drainage procedure.. **This report has been created using voice recognition software.  It may contain minor errors which are inherent in voice recognition technology.** Final report electronically signed by Dr. Rosalee William on 3/19/2024 2:39 PM    CT ABDOMEN PELVIS WO CONTRAST Additional Contrast? None    Result Date: 3/18/2024  CT abdomen and pelvis without contrast Comparison: CT/SR - CT ABDOMEN PELVIS W IV CONTRAST - 01/26/2024 11:12 PM EST Findings: Bibasilar subsegmental atelectasis. Hypodense left hepatic cyst. Gallbladder is absent. Enlarged spleen at 13.5 craniocaudal dimension with a few calcified granulomas. Pancreas and adrenal glands remarkable. Previous posterior subcapsular fluid collection of the left kidney is decreased in size, measuring 2.7 x 1.7 cm from 4.7 x 2.7 cm previously. This is contiguous with a posterior left retroperitoneal fluid collection abutting the lateral margin of the left psoas, which is increased in size, measuring 7.3 x 3.2 cm from 3 x 1.5 cm previously. Previous

## 2024-03-22 NOTE — PROGRESS NOTES
[facility]  INPATIENT PHYSICAL THERAPY  DAILY NOTE  [facility] MED SURG 8AB - 8A-07/007-A    Time In: 1025  Time Out: 1037  Timed Code Treatment Minutes: 12 Minutes  Minutes: 12          Date: 3/22/2024  Patient Name: Elli Coulter,  Gender:  female        MRN: [id number]  : 1955  (68 y.o.)     Referring Practitioner: Balwinder Patel APRN - CNP  Diagnosis: Abdominal Pain  Additional Pertinent Hx: Per EMR:Elli Coulter is a 68 year old female who presents with left lower quadrant abdominal pain that started early today. She says had cysts in her back on the left-side that were previously drained but unsure the cause of these or when they were drained. She says the abdominal pain has been on and off for a while but worse today. She is urinating normal today and denies hematuria, vomiting, nausea, headache, chest pain. She has been unable to walk today due to the pain. She has a history of kidney disease. Perinephric hematoma vs retroperitoneal fluid collection, recent left perinephric abscess: Had drain pulled 3/5 with low OP. Repeat CT abd pelvis w/out contrast shows previous left retroperitoneal drainage catheter removed and reaccumulation of left posterior retroperitoneal fluid collection. Piperacillin-tazobactam for intra-abdominal infection coverage. On 3/19 patient CT for left side retroperitoneal fluid collection drain placement.     Prior Level of Function:  Type of Home: Facility ([facility])  Home Layout: One level  Home Access: Level entry  Home Equipment: Walker, rolling, Cane        ADL Assistance: Needs assistance  Ambulation Assistance: Independent  Transfer Assistance: Independent  Additional Comments: Patient having increased difficulty answering PLOF questions. Pt reports she sometimes walks with an AD, unsure if cane or RW. Notes indicate patient is on the skilled side of [facility], but patient unable to recall if she receives therapy. No family  present to verify above information, patient unable to complete her thoughts when trying to answer PLOF questions.    Restrictions/Precautions:  Restrictions/Precautions: General Precautions  Position Activity Restriction  Other position/activity restrictions: Drain in L abdomen.     SUBJECTIVE: Pt in supine and initially refusing therapy, pt required encouragement to participate. Pt agreeable w/ supine therex. Pt reporting pain in L side and mood feeling \"blah\"    PAIN: 8/10: L side     Vitals: Vitals not assessed per clinical judgement, see nursing flowsheet    OBJECTIVE:  Bed Mobility:  Not Tested    Transfers:  Not Tested    Ambulation:  Not Tested    Exercise:  Patient was guided in 1 set(s) 10 reps of exercise to both lower extremities.  Ankle pumps, Glut sets, Quad sets, Heelslides, Short arc quads, and Hip abduction/adduction.  Exercises were completed for increased independence with functional mobility.    Functional Outcome Measures: Completed  AM-PAC Inpatient Mobility without Stair Climbing Raw Score : 15  AM-PAC Inpatient without Stair Climbing T-Scale Score : 43.03  Modified Hancock Scale:  Not Applicable    ASSESSMENT:  Assessment: Patient progressing toward established goals.  Activity Tolerance:  Patient tolerance of  treatment: fair. Pt tolerated therex fair despite pain in L side. Pt required increased time to complete therex d/t pain and fatigue.     Equipment Recommendations:Equipment Needed: No  Discharge Recommendations: Subacte/Skilled Nursing Facility  Plan: Current Treatment Recommendations: Strengthening, Balance training, Functional mobility training, Transfer training, Gait training, Safety education & training, Patient/Caregiver education & training, Therapeutic activities  General Plan:  (3-5x)    Education  Education:  Learners: Patient  Patient Education: Verbal Exercise Instruction, Activity Pacing, Energy Conservation, importance of mobilizing    Goals:  Patient Goals : Pt goal to

## 2024-03-22 NOTE — PROGRESS NOTES
collection R18.8         ASSESSMENT/PLAN   Perinephric abscess s/p drain  Recurrent left retroperitoneal abscess related to above  The drainage tube was flushed with 10 cc saline. Will continue to flush every shift  Continue current iv antibiotic      Nikos Virk MD, 3/22/2024 10:40 AM

## 2024-03-22 NOTE — PROGRESS NOTES
03/22/24 1045   Encounter Summary   Encounter Overview/Reason  Spiritual/Emotional Needs   Service Provided For: Patient   Referral/Consult From: Rounding   Support System Family members;Children   Last Encounter  03/22/24   Complexity of Encounter Moderate   Begin Time 1035   End Time  1045   Total Time Calculated 10 min   Spiritual/Emotional needs   Type Spiritual Support   Assessment/Intervention/Outcome   Assessment Calm;Coping   Intervention Nurtured Hope;Prayer (assurance of)/Animas;Sustaining Presence/Ministry of presence   Outcome Comfort     Assessment:  In my encounter with the 68 yr old patient, while rounding  the unit 8A,  I provided spiritual care to patient through conversation, I also came to assess the patient's spiritual needs present. The pt was admitted due to abdominal pain.     Interventions:  I provided prayer, emotional support and words of comfort.  provided a listening presence and encouraged pt to share their beliefs and how I can support them during their hospitalization.     Outcomes:  The patient was encouraged and didn't share any further spiritual needs at this time.     Plan:  Chaplains will follow-up at a later time for assessment of any spiritual care needs present.

## 2024-03-23 ENCOUNTER — TELEPHONE (OUTPATIENT)
Dept: UROLOGY | Age: 69
End: 2024-03-23

## 2024-03-23 VITALS
OXYGEN SATURATION: 93 % | HEIGHT: 64 IN | BODY MASS INDEX: 27.66 KG/M2 | SYSTOLIC BLOOD PRESSURE: 125 MMHG | WEIGHT: 162 LBS | DIASTOLIC BLOOD PRESSURE: 69 MMHG | HEART RATE: 50 BPM | RESPIRATION RATE: 19 BRPM | TEMPERATURE: 97.6 F

## 2024-03-23 PROCEDURE — 94640 AIRWAY INHALATION TREATMENT: CPT

## 2024-03-23 PROCEDURE — 6360000002 HC RX W HCPCS: Performed by: PHYSICIAN ASSISTANT

## 2024-03-23 PROCEDURE — 6370000000 HC RX 637 (ALT 250 FOR IP): Performed by: UROLOGY

## 2024-03-23 PROCEDURE — 6370000000 HC RX 637 (ALT 250 FOR IP): Performed by: PHYSICIAN ASSISTANT

## 2024-03-23 PROCEDURE — 99239 HOSP IP/OBS DSCHRG MGMT >30: CPT | Performed by: INTERNAL MEDICINE

## 2024-03-23 PROCEDURE — 2580000003 HC RX 258: Performed by: PHYSICIAN ASSISTANT

## 2024-03-23 PROCEDURE — 94761 N-INVAS EAR/PLS OXIMETRY MLT: CPT

## 2024-03-23 RX ORDER — AMOXICILLIN AND CLAVULANATE POTASSIUM 500; 125 MG/1; MG/1
1 TABLET, FILM COATED ORAL 3 TIMES DAILY
Refills: 0 | DISCHARGE
Start: 2024-03-23 | End: 2024-04-13

## 2024-03-23 RX ORDER — KETOROLAC TROMETHAMINE 10 MG/1
10 TABLET, FILM COATED ORAL EVERY 6 HOURS PRN
Qty: 20 TABLET | Refills: 0 | DISCHARGE
Start: 2024-03-23 | End: 2024-04-02

## 2024-03-23 RX ADMIN — PIPERACILLIN AND TAZOBACTAM 3375 MG: 3; .375 INJECTION, POWDER, LYOPHILIZED, FOR SOLUTION INTRAVENOUS at 02:41

## 2024-03-23 RX ADMIN — ALBUTEROL SULFATE 2 PUFF: 90 AEROSOL, METERED RESPIRATORY (INHALATION) at 07:54

## 2024-03-23 RX ADMIN — HYDROCODONE BITARTRATE AND ACETAMINOPHEN 1 TABLET: 5; 325 TABLET ORAL at 09:59

## 2024-03-23 RX ADMIN — FENOFIBRATE 160 MG: 160 TABLET ORAL at 09:59

## 2024-03-23 RX ADMIN — BUPROPION HYDROCHLORIDE 300 MG: 300 TABLET, FILM COATED, EXTENDED RELEASE ORAL at 12:17

## 2024-03-23 RX ADMIN — PIPERACILLIN AND TAZOBACTAM 3375 MG: 3; .375 INJECTION, POWDER, LYOPHILIZED, FOR SOLUTION INTRAVENOUS at 10:23

## 2024-03-23 RX ADMIN — PANTOPRAZOLE SODIUM 40 MG: 40 TABLET, DELAYED RELEASE ORAL at 06:47

## 2024-03-23 RX ADMIN — SODIUM CHLORIDE, PRESERVATIVE FREE 10 ML: 5 INJECTION INTRAVENOUS at 10:02

## 2024-03-23 RX ADMIN — LEVOTHYROXINE SODIUM 75 MCG: 0.07 TABLET ORAL at 06:47

## 2024-03-23 RX ADMIN — FLUTICASONE PROPIONATE 1 SPRAY: 50 SPRAY, METERED NASAL at 10:01

## 2024-03-23 RX ADMIN — ALBUTEROL SULFATE 2 PUFF: 90 AEROSOL, METERED RESPIRATORY (INHALATION) at 15:28

## 2024-03-23 RX ADMIN — FOLIC ACID 1 MG: 1 TABLET ORAL at 09:59

## 2024-03-23 RX ADMIN — ESCITALOPRAM OXALATE 20 MG: 20 TABLET ORAL at 09:59

## 2024-03-23 ASSESSMENT — PAIN DESCRIPTION - LOCATION
LOCATION: FLANK
LOCATION: FLANK

## 2024-03-23 ASSESSMENT — PAIN DESCRIPTION - DESCRIPTORS
DESCRIPTORS: ACHING;SORE
DESCRIPTORS: ACHING;DISCOMFORT

## 2024-03-23 ASSESSMENT — PAIN SCALES - GENERAL
PAINLEVEL_OUTOF10: 5
PAINLEVEL_OUTOF10: 7
PAINLEVEL_OUTOF10: 0

## 2024-03-23 ASSESSMENT — PAIN DESCRIPTION - ONSET: ONSET: ON-GOING

## 2024-03-23 ASSESSMENT — PAIN DESCRIPTION - FREQUENCY: FREQUENCY: CONTINUOUS

## 2024-03-23 ASSESSMENT — PAIN DESCRIPTION - PAIN TYPE: TYPE: ACUTE PAIN

## 2024-03-23 ASSESSMENT — PAIN DESCRIPTION - ORIENTATION
ORIENTATION: LEFT
ORIENTATION: LEFT

## 2024-03-23 NOTE — PLAN OF CARE
Problem: Respiratory - Adult  Goal: Clear lung sounds  Outcome: Progressing  Note: Patient's breath sounds were clear and diminished throughout all lung fields. Continue taking inhalers and breathing treatments as ordered to improve aeration.

## 2024-03-23 NOTE — PROGRESS NOTES
Urology Progress Note  HPI/Subjective  -S/p cysto with bladder botox 2023  -S/p cystoscopy left ureteroscopy, left holmium laser lithotripsy and stent exchange 23 by Dr. Lanier.     -s/p L side retroperitoneal fluid collection drain placement      His/Her current Diet is: ADULT DIET; Regular; 4 carb choices (60 gm/meal); Low Fat/Low Chol/High Fiber/LETICIA.    Labs yesterday 3/22/24, none today  Hgb 9.2  Wbc 5.8  Cre 0.7  VSS       Since the previous note, the patient reports the following:  No acute issues overnight.  No fevers or chills.  No nausea or vomiting.  No chest pain or shortness of breath.  No calf pain.  Still c/o pain - improves with pain medication per pt.  7/10 now but also asking for pain medication now.  Tolerating PO Diet.       Patient is resting in bed, voiding yellow urine, ambulating with assistance, tolerating regular diet, denies any nausea or vomiting. There are complaints of 7/10 pain at this time. But it improved with medication and she is asking for it now.     Assess/plan  HGB stable - ok to restart ASA  Cont abx  Fluid culture +proteus, ID on board  Drain with blood tinged output approx 225ml total so far  Cont drain at discharge  She reports improved pain  URO is facilitating referral as outpatient to [facility] for second opinion on fluid collection  Will follow while inpatient  Ok for discharge with drain from URO standpoint              Vitals:  BP (!) 168/75   Pulse 60   Temp 98.1 °F (36.7 °C) (Oral)   Resp 16   Ht 1.626 m (5' 4\")   Wt 73.5 kg (162 lb)   SpO2 95%   BMI 27.81 kg/m²   Temp  Av °F (36.7 °C)  Min: 97.8 °F (36.6 °C)  Max: 98.1 °F (36.7 °C)    Intake/Output Summary (Last 24 hours) at 3/22/2024 2144  Last data filed at 3/22/2024 1648  Gross per 24 hour   Intake 240 ml   Output 1950 ml   Net -1710 ml       Social History     Socioeconomic History    Marital status:      Spouse name: Not on file    Number of children: 3    Years of education: 12

## 2024-03-23 NOTE — PLAN OF CARE
Problem: Discharge Planning  Goal: Discharge to home or other facility with appropriate resources  Outcome: Progressing  Note: Patient plans to discharge [facility].  No barriers noted at this time.       Problem: Safety - Adult  Goal: Free from fall injury  Outcome: Progressing  Note: Patient free from falls.  Fall precautions in place.  Patient encouraged to use call light.  Bed alarm on.      Problem: Pain  Goal: Verbalizes/displays adequate comfort level or baseline comfort level  Outcome: Progressing  Note: Patient will have decrease pain. Patient will rate pain on a 0-10 scale. Non-pharmaceutical pain interventions will be used before medications.        Problem: Chronic Conditions and Co-morbidities  Goal: Patient's chronic conditions and co-morbidity symptoms are monitored and maintained or improved  Outcome: Progressing     Problem: ABCDS Injury Assessment  Goal: Absence of physical injury  Note: Patient free from accidental injury. Bed in low locked position, side rails up x2. Call light and personal belongings within reach.       Problem: Skin/Tissue Integrity  Goal: Absence of new skin breakdown  Description: 1.  Monitor for areas of redness and/or skin breakdown  2.  Assess vascular access sites hourly  3.  Every 4-6 hours minimum:  Change oxygen saturation probe site  4.  Every 4-6 hours:  If on nasal continuous positive airway pressure, respiratory therapy assess nares and determine need for appliance change or resting period.  Outcome: Progressing  Note: No skin breakdown noted this shift. Patient encouraged to change position frequently.

## 2024-03-23 NOTE — DISCHARGE SUMMARY
created using voice recognition software.  It may contain minor errors which are inherent in voice recognition technology.** Final report electronically signed by Dr. Rosalee William on 3/19/2024 2:39 PM    CT ABDOMEN PELVIS WO CONTRAST Additional Contrast? None    Result Date: 3/18/2024  CT abdomen and pelvis without contrast Comparison: CT/SR - CT ABDOMEN PELVIS W IV CONTRAST - 01/26/2024 11:12 PM EST Findings: Bibasilar subsegmental atelectasis. Hypodense left hepatic cyst. Gallbladder is absent. Enlarged spleen at 13.5 craniocaudal dimension with a few calcified granulomas. Pancreas and adrenal glands remarkable. Previous posterior subcapsular fluid collection of the left kidney is decreased in size, measuring 2.7 x 1.7 cm from 4.7 x 2.7 cm previously. This is contiguous with a posterior left retroperitoneal fluid collection abutting the lateral margin of the left psoas, which is increased in size, measuring 7.3 x 3.2 cm from 3 x 1.5 cm previously. Previous left retroperitoneal drainage catheter is removed. Stable nonobstructing stones in the left kidney. Right kidney is unremarkable. No hydronephrosis. No bowel obstruction, pneumoperitoneum, or pneumatosis. The appendix is normal. Colonic diverticula without acute diverticulitis. Calcified plaque in the nonaneurysmal aorta and iliac arteries. There are no enlarged abdominal or pelvic lymph nodes. The urinary bladder is unremarkable. Uterus is absent. No free fluid in the pelvis. There are no aggressive osseous lesions.     1. Previous left retroperitoneal drainage catheter is removed. 2. Reaccumulation and increased size of the left posterior retroperitoneal fluid collection abutting the lateral margin of the left psoas, likely from decompression from the left posterior renal subcapsular fluid collection which is mildly decreased in size. 3. Stable nonobstructing left renal stones. No hydronephrosis. 4. Colonic diverticula. 5. Nonspecific mild splenomegaly. This  document has been electronically signed by: John Paul Pisano MD on 03/18/2024 06:04 PM All CTs at this facility use dose modulation techniques and iterative reconstructions, and/or weight-based dosing when appropriate to reduce radiation to a low as reasonably achievable.      Electronically signed by Jenny Sherwood MD on 3/23/2024 at 12:45 PM

## 2024-03-23 NOTE — PROGRESS NOTES
5-40 mL IntraVENous 2 times per day    melatonin  3 mg Oral Nightly    [START ON 3/24/2024] levothyroxine  150 mcg Oral Weekly      sodium chloride       HYDROcodone 5 mg - acetaminophen **OR** HYDROcodone 5 mg - acetaminophen, albuterol, sodium chloride flush, sodium chloride, potassium chloride **OR** potassium alternative oral replacement **OR** potassium chloride, magnesium sulfate, ondansetron **OR** ondansetron, polyethylene glycol, acetaminophen **OR** acetaminophen      LABS:     CBC:   Recent Labs     03/22/24  0531   WBC 5.8   HGB 9.2*   *       BMP:    Recent Labs     03/22/24  0531      K 4.3      CO2 24   BUN 12   CREATININE 0.7   GLUCOSE 100       Calcium:  Recent Labs     03/22/24  0531   CALCIUM 9.7          CULTURES:   UA: No results for input(s): \"SPECGRAV\", \"PHUR\", \"COLORU\", \"CLARITYU\", \"MUCUS\", \"PROTEINU\", \"BLOODU\", \"RBCUA\", \"WBCUA\", \"BACTERIA\", \"NITRU\", \"GLUCOSEU\", \"BILIRUBINUR\", \"UROBILINOGEN\", \"KETUA\", \"LABCAST\", \"LABCASTTY\", \"AMORPHOS\" in the last 72 hours.    Invalid input(s): \"CRYSTALS\"  Micro:   Lab Results   Component Value Date/Time    BC  01/26/2024 10:00 PM     No growth 24 hours. No growth 48 hours. No growth at 5 days            Problem list of patient:     Patient Active Problem List   Diagnosis Code    GERD (gastroesophageal reflux disease) K21.9    Colon polyps K63.5    History of pulmonary embolism Z86.711    COPD (chronic obstructive pulmonary disease) ([facility]) J44.9    Cannabis abuse F12.10    Cocaine abuse ([facility]) F14.10    Alcohol dependence in remission ([facility]) F10.21    GI bleed K92.2    Erosive esophagitis K22.10    HTN (hypertension), benign I10    Bipolar 1 disorder ([facility]) F31.9    Chronic pain G89.29    Hiatal hernia K44.9    Erosive gastritis K29.60    H pylori ulcer K27.9, B96.81    History of Helicobacter pylori infection Z86.19    Hyponatremia E87.1    Essential hypertension I10    Hypothyroidism E03.9    History of DVT (deep vein thrombosis) Z86.718     Depression F32.A    Dyslipidemia E78.5    Chronic heart failure with preserved ejection fraction (HFpEF) ([facility]) I50.32    Tobacco use disorder F17.200    Asymmetrical sensorineural hearing loss H90.3    Tinnitus, left ear H93.12    Substance-induced psychotic disorder ([facility]) F19.959    Polysubstance abuse ([facility]) F19.10    Right arm weakness R29.898    Paresthesias R20.2    Depression with anxiety F41.8    Amnesia R41.3    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    Change of, skin texture R23.4    Chronic midline low back pain without sciatica M54.50, G89.29    Coronary vasospasm ([facility]) I20.1    Derangement of knee M23.90    Disorder associated with Helicobacter species B96.89    Disturbance of skin sensation R20.9    Endometriosis N80.9    Glaucoma suspect H40.009    History of arterial ischemic stroke Z86.73    Large liver R16.0    Lesion of ulnar nerve G56.20    Nicotine dependence F17.200    Pancreatic insufficiency K86.89    Primary osteoarthritis involving multiple joints M15.9    Sciatica M54.30    Type 2 diabetes mellitus without complication, without long-term current use of insulin ([facility]) E11.9    Urinary incontinence, overflow N39.490    Renal hematoma, left S37.012A    Perinephric abscess N15.1    Vitamin B6 deficiency (non anemic) E53.1    Abdominal pain R10.9    Acute encephalopathy G93.40    Urinary tract infection in female N39.0    Acute cystitis with hematuria N30.01    Normocytic anemia D64.9    Coronary artery disease involving native coronary artery of native heart without angina pectoris I25.10    Physical deconditioning R53.81    Metabolic encephalopathy G93.41    Hypertriglyceridemia E78.1    Esophagitis K20.90    Diverticulosis K57.90    Hepatic steatosis K76.0    Iron deficiency anemia D50.9    Retroperitoneal fluid collection R18.8         ASSESSMENT/PLAN   Perinephric abscess s/p drain  Recurrent left retroperitoneal abscess related to above  Continue to flush the drain  Ok with discharge plan

## 2024-03-23 NOTE — PROGRESS NOTES
Discharge teaching and instructions for diagnosis/procedure of abdominal pain completed with patient using teachback method. AVS reviewed. Prescriptions sent to preferred pharamcy. Patient voiced understanding regarding prescriptions, follow up appointments, and care of self at home. Discharged in a wheelchair to  skilled nursing per EMS transportation

## 2024-03-25 ENCOUNTER — TELEPHONE (OUTPATIENT)
Dept: UROLOGY | Age: 69
End: 2024-03-25

## 2024-03-25 DIAGNOSIS — N28.89 PERINEPHRIC FLUID COLLECTION: Primary | ICD-10-CM

## 2024-04-10 ENCOUNTER — OFFICE VISIT (OUTPATIENT)
Dept: UROLOGY | Age: 69
End: 2024-04-10
Payer: MEDICARE

## 2024-04-10 VITALS — HEIGHT: 64 IN | BODY MASS INDEX: 27.66 KG/M2 | WEIGHT: 162 LBS | RESPIRATION RATE: 14 BRPM

## 2024-04-10 DIAGNOSIS — N39.3 STRESS INCONTINENCE IN FEMALE: ICD-10-CM

## 2024-04-10 DIAGNOSIS — R82.90 ABNORMAL URINALYSIS: ICD-10-CM

## 2024-04-10 DIAGNOSIS — N32.81 OAB (OVERACTIVE BLADDER): Primary | ICD-10-CM

## 2024-04-10 LAB
BILIRUBIN URINE: NEGATIVE
BLOOD URINE, POC: ABNORMAL
CHARACTER, URINE: CLEAR
COLOR, URINE: YELLOW
GLUCOSE URINE: 500 MG/DL
KETONES, URINE: NEGATIVE
LEUKOCYTE CLUMPS, URINE: ABNORMAL
NITRITE, URINE: POSITIVE
PH, URINE: 5.5 (ref 5–9)
POST VOID RESIDUAL (PVR): 99 ML
PROTEIN, URINE: NEGATIVE MG/DL
SPECIFIC GRAVITY, URINE: >= 1.03 (ref 1–1.03)
UROBILINOGEN, URINE: 0.2 EU/DL (ref 0–1)

## 2024-04-10 PROCEDURE — G8400 PT W/DXA NO RESULTS DOC: HCPCS

## 2024-04-10 PROCEDURE — 1090F PRES/ABSN URINE INCON ASSESS: CPT

## 2024-04-10 PROCEDURE — 3017F COLORECTAL CA SCREEN DOC REV: CPT

## 2024-04-10 PROCEDURE — 1123F ACP DISCUSS/DSCN MKR DOCD: CPT

## 2024-04-10 PROCEDURE — 1111F DSCHRG MED/CURRENT MED MERGE: CPT

## 2024-04-10 PROCEDURE — 51798 US URINE CAPACITY MEASURE: CPT

## 2024-04-10 PROCEDURE — G8427 DOCREV CUR MEDS BY ELIG CLIN: HCPCS

## 2024-04-10 PROCEDURE — G8417 CALC BMI ABV UP PARAM F/U: HCPCS

## 2024-04-10 PROCEDURE — 99213 OFFICE O/P EST LOW 20 MIN: CPT

## 2024-04-10 PROCEDURE — 0509F URINE INCON PLAN DOCD: CPT

## 2024-04-10 PROCEDURE — 81003 URINALYSIS AUTO W/O SCOPE: CPT

## 2024-04-10 PROCEDURE — 4004F PT TOBACCO SCREEN RCVD TLK: CPT

## 2024-04-10 ASSESSMENT — ENCOUNTER SYMPTOMS
CONSTIPATION: 0
DIARRHEA: 1

## 2024-04-10 NOTE — PATIENT INSTRUCTIONS
Follow-up with [facility] urology  Follow-up with [facility] in 6 weeks or sooner if needed  Call with questions, comments, or concerns. I recommend going to the ED for further evaluation if you develop fever, chills, nausea, vomiting, chest pain, SOB, or calf pain.

## 2024-04-10 NOTE — PROGRESS NOTES
[facility] [place] [facility]  [facility] UROLOGY  [address]  [address]  [facility] [address]  Dept: [phone]  Loc: [phone]    Visit Date: 4/10/2024        HPI:     HPI  Ms. Lynne is a 68-year-old female that presents in follow-up.    Hx of a L sided perinephric hematoma/abscess. She has had several hospitalization over the last year, with the first being on 1/15/2023. IR has performed multiple procedures to assist with draining this abscess. Presented to [facility] with recurrent L perinephric abscess, third occurrence. CT a/p on 3/18 with previous L retroperitoneal drainage catheter removed and re-accumulation of the L posterior retroperitoneal fluid collection. Treated with zosyn and underwent repeat IR guided drain placement at L retroperitoneum on 3/19. Fluid culture with proteus. Total of 550 cc output at this time.     Also with hx of kidney stones. Underwent cystoscopy left ureteroscopy, left holmium laser lithotripsy and stent exchange 1/12/23 with Dr. Lanier. Still with L non-obstructive nephrolithiasis.     Hx of OAB and incontinence. She did have an interstim which was ultimately removed. She is s/p cystoscopy with bladder botox 200 units by Dr. Lanier on 8/22/2023.     Current Outpatient Medications   Medication Sig Dispense Refill    amoxicillin-clavulanate (AUGMENTIN) 500-125 MG per tablet Take 1 tablet by mouth 3 times daily for 21 days  0    diclofenac sodium (VOLTAREN) 1 % GEL Apply 4 g topically 2 times daily      ferrous sulfate (IRON 325) 325 (65 Fe) MG tablet Take 1 tablet by mouth every 48 hours With breakfast 30 tablet 0    lidocaine 4 % external patch Place 1 patch onto the skin daily      vitamin B-6 (B-6) 50 MG tablet Take 1 tablet by mouth daily 30 tablet 3    Water For Irrigation, Sterile (STERILE WATER FOR IRRIGATION) Irrigate with 100 ml ns or sterile water daily and prn for occlusion 500 mL 3    polyethylene glycol (GLYCOLAX) 17 g packet Take 1

## 2024-04-13 LAB
BACTERIA UR CULT: ABNORMAL
BACTERIA UR CULT: ABNORMAL
ORGANISM: ABNORMAL
ORGANISM: ABNORMAL

## 2024-04-15 ENCOUNTER — TELEPHONE (OUTPATIENT)
Dept: UROLOGY | Age: 69
End: 2024-04-15

## 2024-04-15 RX ORDER — CEFDINIR 300 MG/1
300 CAPSULE ORAL 2 TIMES DAILY
Qty: 20 CAPSULE | Refills: 0 | Status: SHIPPED | OUTPATIENT
Start: 2024-04-15 | End: 2024-04-25

## 2024-04-15 NOTE — TELEPHONE ENCOUNTER
Rao at the [facility] advised of the urine results and verbal order given for omnicef. Lab results faxed to the [facility].    [facility] notified to cancel the prescription.

## 2024-04-15 NOTE — TELEPHONE ENCOUNTER
Start Omnicef per cultures    The patient should go to the ED should they develop fever, chills, nausea, vomiting, chest pain, SOB, calf pain, feelings of incomplete emptying, or should they otherwise feel they need evaluated

## 2024-05-08 ENCOUNTER — HOSPITAL ENCOUNTER (OUTPATIENT)
Dept: CT IMAGING | Age: 69
Discharge: HOME | End: 2024-05-08
Attending: INTERNAL MEDICINE
Payer: MEDICARE

## 2024-05-08 ENCOUNTER — HOSPITAL ENCOUNTER (OUTPATIENT)
Dept: ULTRASOUND IMAGING | Age: 69
Discharge: HOME | End: 2024-05-08
Attending: INTERNAL MEDICINE
Payer: MEDICARE

## 2024-05-08 DIAGNOSIS — Z87.891 PERSONAL HISTORY OF TOBACCO USE: ICD-10-CM

## 2024-05-08 DIAGNOSIS — S37.019A PERINEPHRIC HEMATOMA: ICD-10-CM

## 2024-05-08 PROCEDURE — 76770 US EXAM ABDO BACK WALL COMP: CPT

## 2024-05-08 PROCEDURE — 71271 CT THORAX LUNG CANCER SCR C-: CPT

## 2024-05-21 ENCOUNTER — OFFICE VISIT (OUTPATIENT)
Dept: UROLOGY | Age: 69
End: 2024-05-21
Payer: MEDICARE

## 2024-05-21 ENCOUNTER — TELEPHONE (OUTPATIENT)
Dept: UROLOGY | Age: 69
End: 2024-05-21

## 2024-05-21 VITALS — WEIGHT: 162 LBS | HEIGHT: 64 IN | RESPIRATION RATE: 16 BRPM | BODY MASS INDEX: 27.66 KG/M2

## 2024-05-21 DIAGNOSIS — N32.81 OAB (OVERACTIVE BLADDER): Primary | ICD-10-CM

## 2024-05-21 DIAGNOSIS — R35.0 URINARY FREQUENCY: ICD-10-CM

## 2024-05-21 LAB
BILIRUBIN, URINE: NEGATIVE
BLOOD URINE, POC: ABNORMAL
CHARACTER, URINE: ABNORMAL
COLOR: YELLOW
GLUCOSE URINE: 500 MG/DL
KETONES, URINE: NEGATIVE
LEUKOCYTE CLUMPS, URINE: ABNORMAL
NITRITE, URINE: NEGATIVE
PH, URINE: 6 (ref 5–9)
POST VOID RESIDUAL (PVR): 103 ML
PROTEIN, URINE: 30 MG/DL
SPECIFIC GRAVITY UA: >= 1.03 (ref 1–1.03)
UROBILINOGEN, URINE: 0.2 EU/DL (ref 0–1)

## 2024-05-21 PROCEDURE — G8400 PT W/DXA NO RESULTS DOC: HCPCS

## 2024-05-21 PROCEDURE — 4004F PT TOBACCO SCREEN RCVD TLK: CPT

## 2024-05-21 PROCEDURE — 81003 URINALYSIS AUTO W/O SCOPE: CPT

## 2024-05-21 PROCEDURE — 1090F PRES/ABSN URINE INCON ASSESS: CPT

## 2024-05-21 PROCEDURE — 3017F COLORECTAL CA SCREEN DOC REV: CPT

## 2024-05-21 PROCEDURE — 1123F ACP DISCUSS/DSCN MKR DOCD: CPT

## 2024-05-21 PROCEDURE — 51798 US URINE CAPACITY MEASURE: CPT

## 2024-05-21 PROCEDURE — G8417 CALC BMI ABV UP PARAM F/U: HCPCS

## 2024-05-21 PROCEDURE — 99213 OFFICE O/P EST LOW 20 MIN: CPT

## 2024-05-21 PROCEDURE — G8427 DOCREV CUR MEDS BY ELIG CLIN: HCPCS

## 2024-05-21 RX ORDER — ONDANSETRON HYDROCHLORIDE 8 MG/1
8 TABLET, FILM COATED ORAL EVERY 8 HOURS PRN
COMMUNITY

## 2024-05-21 NOTE — PROGRESS NOTES
onto the skin daily      vitamin B-6 (B-6) 50 MG tablet Take 1 tablet by mouth daily 30 tablet 3    Catheters (BARDIA URETHRAL CATHETER 16FR) MISC 1 each by Does not apply route as needed (see) 1 16 fr catheter to be inserted may irrigate as needed with 100ml ns or sterile water daily and prn occlusion   may use any supply vendor 1 each 0    Water For Irrigation, Sterile (STERILE WATER FOR IRRIGATION) Irrigate with 100 ml ns or sterile water daily and prn for occlusion 500 mL 3    Incontinence Supplies (BARD BASIC IRRIGATION TRAY) KIT 1 each by Does not apply route as needed (irrigate daily and prn) 16 kit 3    Incontinence Supplies (URINARY DRAINAGE BAG) MISC 1 each by Does not apply route as needed (for urinary norton catheter) 2000ml drainage bag for urinary catheter  may use any supply vendor 1 each 0    Catheters (RALF NORTON INSERTION TRAY) MISC 1 each by Does not apply route as needed (1 insertion tray as needed) May use any vendor brand available to inset 16 fr norton catheter 1 each 0    polyethylene glycol (GLYCOLAX) 17 g packet Take 1 packet by mouth daily as needed for Constipation 527 g 2    docusate sodium (COLACE) 100 MG capsule Take 1 capsule by mouth 2 times daily as needed for Constipation 30 capsule 2    Cholecalciferol (VITAMIN D3) 50 MCG (2000 UT) CAPS Take 1 capsule by mouth daily      magnesium hydroxide (MILK OF MAGNESIA) 400 MG/5ML suspension Take 30 mLs by mouth daily as needed for Constipation      pantoprazole (PROTONIX) 40 MG tablet Take 1 tablet by mouth 2 times daily (before meals) 30 tablet 3    buPROPion (WELLBUTRIN XL) 150 MG extended release tablet Take 2 tablets by mouth every morning      Albuterol (VENTOLIN IN) Inhale 90 mcg into the lungs every 6 hours as needed (wheezing)      QUEtiapine (SEROQUEL) 300 MG tablet Take 2 tablets by mouth nightly 2 tab      carvedilol (COREG) 3.125 MG tablet TAKE 1 TABLET BY MOUTH TWICE DAILY      albuterol (PROVENTIL) (2.5 MG/3ML) 0.083% nebulizer

## 2024-05-21 NOTE — PATIENT INSTRUCTIONS
Follow-up for CTU  Continue to follow with [facility]   Call with questions, comments, or concerns. I recommend going to the ED for further evaluation if you develop fever, chills, nausea, vomiting, chest pain, SOB, or calf pain.

## 2024-05-21 NOTE — TELEPHONE ENCOUNTER
Patient scheduled for CT UROGRAM  at [facility] on 5/28/2024.  Arrival of 900 for a 930 scan time.  Order with instructions given to the patient in the office

## 2024-05-22 ENCOUNTER — OFFICE VISIT (OUTPATIENT)
Dept: PULMONOLOGY | Age: 69
End: 2024-05-22
Payer: MEDICARE

## 2024-05-22 VITALS
HEART RATE: 84 BPM | TEMPERATURE: 97.5 F | OXYGEN SATURATION: 97 % | HEIGHT: 64 IN | SYSTOLIC BLOOD PRESSURE: 122 MMHG | DIASTOLIC BLOOD PRESSURE: 78 MMHG | WEIGHT: 162 LBS | BODY MASS INDEX: 27.66 KG/M2

## 2024-05-22 DIAGNOSIS — J44.9 CHRONIC OBSTRUCTIVE PULMONARY DISEASE, UNSPECIFIED COPD TYPE (HCC): Primary | ICD-10-CM

## 2024-05-22 DIAGNOSIS — Z87.891 PERSONAL HISTORY OF SMOKING: ICD-10-CM

## 2024-05-22 PROCEDURE — 3023F SPIROM DOC REV: CPT | Performed by: INTERNAL MEDICINE

## 2024-05-22 PROCEDURE — 1123F ACP DISCUSS/DSCN MKR DOCD: CPT | Performed by: INTERNAL MEDICINE

## 2024-05-22 PROCEDURE — G8427 DOCREV CUR MEDS BY ELIG CLIN: HCPCS | Performed by: INTERNAL MEDICINE

## 2024-05-22 PROCEDURE — 3017F COLORECTAL CA SCREEN DOC REV: CPT | Performed by: INTERNAL MEDICINE

## 2024-05-22 PROCEDURE — 3078F DIAST BP <80 MM HG: CPT | Performed by: INTERNAL MEDICINE

## 2024-05-22 PROCEDURE — 3074F SYST BP LT 130 MM HG: CPT | Performed by: INTERNAL MEDICINE

## 2024-05-22 PROCEDURE — G8417 CALC BMI ABV UP PARAM F/U: HCPCS | Performed by: INTERNAL MEDICINE

## 2024-05-22 PROCEDURE — 4004F PT TOBACCO SCREEN RCVD TLK: CPT | Performed by: INTERNAL MEDICINE

## 2024-05-22 PROCEDURE — 99213 OFFICE O/P EST LOW 20 MIN: CPT | Performed by: INTERNAL MEDICINE

## 2024-05-22 PROCEDURE — G8400 PT W/DXA NO RESULTS DOC: HCPCS | Performed by: INTERNAL MEDICINE

## 2024-05-22 PROCEDURE — 1090F PRES/ABSN URINE INCON ASSESS: CPT | Performed by: INTERNAL MEDICINE

## 2024-05-22 ASSESSMENT — ENCOUNTER SYMPTOMS
CHEST TIGHTNESS: 0
WHEEZING: 0
VOICE CHANGE: 1
SHORTNESS OF BREATH: 0
COUGH: 0

## 2024-05-22 NOTE — PROGRESS NOTES
Subjective:      Patient ID: Elli Coulter is a 68 y.o. female.    CC: COPD    HPI     Comes with sister to review results of the screening CT scan of the chest for daily diagnosis of lung cancer, stop smoking a few months ago    Screening CT scan of the chest for early diagnosis of lung cancer was review study was completed on 5/8/2024 I was reviewed as a RADS 2 with no suspicious lesions    Currently still in the nursing home, she has percutaneous drainage catheter for recurring and persistent left perinephric abscess, purulent material in the back, has schedule appointment at [facility] for further recommendations and consideration for a refractory nephrectomy    Very little mobility at the nursing home currently on a wheelchair on room air her breathing is at baseline denies wheezing cough dyspnea dyspnea exertion chest pain hemoptysis.    Pulmonary function test consistent with obstructive/restrictive ventilatory impairment currently on albuterol nebulizer and as needed basis.    Review of Systems   Constitutional:  Positive for fatigue.   HENT:  Positive for voice change.    Respiratory:  Negative for cough, chest tightness, shortness of breath and wheezing.    Endocrine: Negative.    Musculoskeletal:  Positive for arthralgias.   Psychiatric/Behavioral:  The patient is nervous/anxious.           All other systems reviewed and are negative    Lung Nodule Screening     [x] Qualifies    [] Does not qualify   [] Declined   [] Completed  Past Medical History:   Diagnosis Date    Allergic rhinitis     Arthritis     back, arms, hips    Bipolar 1 disorder (HCC)     Cancer ([facility]) 2011    cancerous polyps removed - Dr. Silva    Cataract     Colon polyps     COPD (chronic obstructive pulmonary disease) ([facility]) 07/24/2014    Coronary vasospasm ([facility]) 08/17/2019    Depression     Diverticulitis of colon     GERD (gastroesophageal reflux disease)     Glaucoma     Hearing loss     Hiatal hernia     History of arterial

## 2024-05-28 ENCOUNTER — HOSPITAL ENCOUNTER (OUTPATIENT)
Age: 69
Discharge: HOME | End: 2024-05-28
Payer: MEDICARE

## 2024-05-28 ENCOUNTER — HOSPITAL ENCOUNTER (OUTPATIENT)
Dept: CT IMAGING | Age: 69
Discharge: HOME | End: 2024-05-28
Payer: MEDICARE

## 2024-05-28 DIAGNOSIS — N28.89 URETERAL FISTULA: ICD-10-CM

## 2024-05-28 LAB
CREAT BLD-MCNC: 0.6 MG/DL (ref 0.5–1.2)
GFR SERPL CREATININE-BSD FRML MDRD: > 90 ML/MIN/1.73M2

## 2024-05-28 PROCEDURE — 74178 CT ABD&PLV WO CNTR FLWD CNTR: CPT | Performed by: UROLOGY

## 2024-05-28 PROCEDURE — 6360000004 HC RX CONTRAST MEDICATION: Performed by: UROLOGY

## 2024-05-28 PROCEDURE — 82565 ASSAY OF CREATININE: CPT

## 2024-05-28 RX ADMIN — IOPAMIDOL 100 ML: 755 INJECTION, SOLUTION INTRAVENOUS at 09:50

## 2024-06-09 ENCOUNTER — HOSPITAL ENCOUNTER (EMERGENCY)
Age: 69
Discharge: HOME | End: 2024-06-09
Attending: INTERNAL MEDICINE
Payer: MEDICARE

## 2024-06-09 ENCOUNTER — APPOINTMENT (OUTPATIENT)
Dept: CT IMAGING | Age: 69
End: 2024-06-09
Payer: MEDICARE

## 2024-06-09 ENCOUNTER — APPOINTMENT (OUTPATIENT)
Dept: GENERAL RADIOLOGY | Age: 69
End: 2024-06-09
Payer: MEDICARE

## 2024-06-09 VITALS
HEART RATE: 77 BPM | DIASTOLIC BLOOD PRESSURE: 62 MMHG | OXYGEN SATURATION: 94 % | BODY MASS INDEX: 27.66 KG/M2 | WEIGHT: 162 LBS | TEMPERATURE: 98.9 F | RESPIRATION RATE: 17 BRPM | HEIGHT: 64 IN | SYSTOLIC BLOOD PRESSURE: 122 MMHG

## 2024-06-09 DIAGNOSIS — R10.9 FLANK PAIN: Primary | ICD-10-CM

## 2024-06-09 LAB
ALBUMIN SERPL BCG-MCNC: 3.8 G/DL (ref 3.5–5.1)
ALP SERPL-CCNC: 78 U/L (ref 38–126)
ALT SERPL W/O P-5'-P-CCNC: 29 U/L (ref 11–66)
ANION GAP SERPL CALC-SCNC: 13 MEQ/L (ref 8–16)
AST SERPL-CCNC: 25 U/L (ref 5–40)
BACTERIA URNS QL MICRO: ABNORMAL /HPF
BASOPHILS ABSOLUTE: 0 THOU/MM3 (ref 0–0.1)
BASOPHILS NFR BLD AUTO: 0.4 %
BILIRUB CONJ SERPL-MCNC: < 0.2 MG/DL (ref 0–0.3)
BILIRUB SERPL-MCNC: 0.4 MG/DL (ref 0.3–1.2)
BILIRUB UR QL STRIP.AUTO: NEGATIVE
BUN SERPL-MCNC: 8 MG/DL (ref 7–22)
CALCIUM SERPL-MCNC: 10 MG/DL (ref 8.5–10.5)
CASTS #/AREA URNS LPF: ABNORMAL /LPF
CASTS 2: ABNORMAL /LPF
CHARACTER UR: ABNORMAL
CHLORIDE SERPL-SCNC: 99 MEQ/L (ref 98–111)
CO2 SERPL-SCNC: 22 MEQ/L (ref 23–33)
COLOR: YELLOW
CREAT SERPL-MCNC: 0.7 MG/DL (ref 0.4–1.2)
CRYSTALS URNS MICRO: ABNORMAL
DEPRECATED RDW RBC AUTO: 45.1 FL (ref 35–45)
EKG ATRIAL RATE: 81 BPM
EKG P AXIS: 61 DEGREES
EKG P-R INTERVAL: 152 MS
EKG Q-T INTERVAL: 378 MS
EKG QRS DURATION: 92 MS
EKG QTC CALCULATION (BAZETT): 439 MS
EKG R AXIS: 1 DEGREES
EKG T AXIS: 79 DEGREES
EKG VENTRICULAR RATE: 81 BPM
EOSINOPHIL NFR BLD AUTO: 0.3 %
EOSINOPHILS ABSOLUTE: 0 THOU/MM3 (ref 0–0.4)
EPITHELIAL CELLS, UA: ABNORMAL /HPF
ERYTHROCYTE [DISTWIDTH] IN BLOOD BY AUTOMATED COUNT: 14.2 % (ref 11.5–14.5)
GFR SERPL CREATININE-BSD FRML MDRD: > 90 ML/MIN/1.73M2
GLUCOSE SERPL-MCNC: 191 MG/DL (ref 70–108)
GLUCOSE UR QL STRIP.AUTO: NEGATIVE MG/DL
HCT VFR BLD AUTO: 36.7 % (ref 37–47)
HGB BLD-MCNC: 11.5 GM/DL (ref 12–16)
HGB UR QL STRIP.AUTO: ABNORMAL
IMM GRANULOCYTES # BLD AUTO: 0.03 THOU/MM3 (ref 0–0.07)
IMM GRANULOCYTES NFR BLD AUTO: 0.4 %
KETONES UR QL STRIP.AUTO: NEGATIVE
LIPASE SERPL-CCNC: 16.7 U/L (ref 5.6–51.3)
LYMPHOCYTES ABSOLUTE: 1 THOU/MM3 (ref 1–4.8)
LYMPHOCYTES NFR BLD AUTO: 13.6 %
MCH RBC QN AUTO: 27.7 PG (ref 26–33)
MCHC RBC AUTO-ENTMCNC: 31.3 GM/DL (ref 32.2–35.5)
MCV RBC AUTO: 88.4 FL (ref 81–99)
MISCELLANEOUS 2: ABNORMAL
MONOCYTES ABSOLUTE: 0.5 THOU/MM3 (ref 0.4–1.3)
MONOCYTES NFR BLD AUTO: 6.9 %
NEUTROPHILS ABSOLUTE: 6 THOU/MM3 (ref 1.8–7.7)
NEUTROPHILS NFR BLD AUTO: 78.4 %
NITRITE UR QL STRIP: NEGATIVE
NRBC BLD AUTO-RTO: 0 /100 WBC
NT-PROBNP SERPL IA-MCNC: 271.2 PG/ML (ref 0–124)
OSMOLALITY SERPL CALC.SUM OF ELEC: 271.7 MOSMOL/KG (ref 275–300)
PH UR STRIP.AUTO: 5.5 [PH] (ref 5–9)
PLATELET # BLD AUTO: 254 THOU/MM3 (ref 130–400)
PMV BLD AUTO: 9.2 FL (ref 9.4–12.4)
POTASSIUM SERPL-SCNC: 4.2 MEQ/L (ref 3.5–5.2)
PROT SERPL-MCNC: 7.1 G/DL (ref 6.1–8)
PROT UR STRIP.AUTO-MCNC: ABNORMAL MG/DL
RBC # BLD AUTO: 4.15 MILL/MM3 (ref 4.2–5.4)
RBC URINE: > 100 /HPF
RENAL EPI CELLS #/AREA URNS HPF: ABNORMAL /[HPF]
SODIUM SERPL-SCNC: 134 MEQ/L (ref 135–145)
SP GR UR REFRACT.AUTO: 1.02 (ref 1–1.03)
TROPONIN, HIGH SENSITIVITY: 13 NG/L (ref 0–12)
UROBILINOGEN, URINE: 1 EU/DL (ref 0–1)
WBC # BLD AUTO: 7.7 THOU/MM3 (ref 4.8–10.8)
WBC #/AREA URNS HPF: ABNORMAL /HPF
WBC #/AREA URNS HPF: NEGATIVE /[HPF]
YEAST LIKE FUNGI URNS QL MICRO: ABNORMAL

## 2024-06-09 PROCEDURE — 83880 ASSAY OF NATRIURETIC PEPTIDE: CPT

## 2024-06-09 PROCEDURE — 93010 ELECTROCARDIOGRAM REPORT: CPT | Performed by: NUCLEAR MEDICINE

## 2024-06-09 PROCEDURE — 82248 BILIRUBIN DIRECT: CPT

## 2024-06-09 PROCEDURE — 85025 COMPLETE CBC W/AUTO DIFF WBC: CPT

## 2024-06-09 PROCEDURE — 96375 TX/PRO/DX INJ NEW DRUG ADDON: CPT

## 2024-06-09 PROCEDURE — 81001 URINALYSIS AUTO W/SCOPE: CPT

## 2024-06-09 PROCEDURE — 84484 ASSAY OF TROPONIN QUANT: CPT

## 2024-06-09 PROCEDURE — 36415 COLL VENOUS BLD VENIPUNCTURE: CPT

## 2024-06-09 PROCEDURE — 74177 CT ABD & PELVIS W/CONTRAST: CPT

## 2024-06-09 PROCEDURE — 99285 EMERGENCY DEPT VISIT HI MDM: CPT

## 2024-06-09 PROCEDURE — 83690 ASSAY OF LIPASE: CPT

## 2024-06-09 PROCEDURE — 93005 ELECTROCARDIOGRAM TRACING: CPT | Performed by: EMERGENCY MEDICINE

## 2024-06-09 PROCEDURE — 96374 THER/PROPH/DIAG INJ IV PUSH: CPT

## 2024-06-09 PROCEDURE — 6360000002 HC RX W HCPCS

## 2024-06-09 PROCEDURE — 80053 COMPREHEN METABOLIC PANEL: CPT

## 2024-06-09 PROCEDURE — 6360000004 HC RX CONTRAST MEDICATION

## 2024-06-09 PROCEDURE — 71046 X-RAY EXAM CHEST 2 VIEWS: CPT

## 2024-06-09 RX ORDER — MORPHINE SULFATE 4 MG/ML
4 INJECTION, SOLUTION INTRAMUSCULAR; INTRAVENOUS ONCE
Status: COMPLETED | OUTPATIENT
Start: 2024-06-09 | End: 2024-06-09

## 2024-06-09 RX ORDER — ONDANSETRON 2 MG/ML
4 INJECTION INTRAMUSCULAR; INTRAVENOUS ONCE
Status: COMPLETED | OUTPATIENT
Start: 2024-06-09 | End: 2024-06-09

## 2024-06-09 RX ADMIN — ONDANSETRON 4 MG: 2 INJECTION INTRAMUSCULAR; INTRAVENOUS at 13:12

## 2024-06-09 RX ADMIN — MORPHINE SULFATE 4 MG: 4 INJECTION, SOLUTION INTRAMUSCULAR; INTRAVENOUS at 13:12

## 2024-06-09 RX ADMIN — IOPAMIDOL 80 ML: 755 INJECTION, SOLUTION INTRAVENOUS at 14:17

## 2024-06-09 ASSESSMENT — PAIN - FUNCTIONAL ASSESSMENT
PAIN_FUNCTIONAL_ASSESSMENT: 0-10

## 2024-06-09 ASSESSMENT — PAIN SCALES - GENERAL
PAINLEVEL_OUTOF10: 8
PAINLEVEL_OUTOF10: 8

## 2024-06-09 ASSESSMENT — PAIN DESCRIPTION - LOCATION: LOCATION: CHEST

## 2024-06-09 NOTE — ED PROVIDER NOTES
[facility] EMERGENCY DEPT      EMERGENCY MEDICINE     Pt Name: Elli Coulter  MRN: [id number]  Birthdate 1955  Date of evaluation: 6/9/2024  Resident Physician: Mamadou Mitchell MD  Attending Physician: Melvin Ibarra DO    CHIEF COMPLAINT       Chief Complaint   Patient presents with    Chest Pain     HISTORY OF PRESENT ILLNESS   Elli Coulter is a 68 y.o. female who presents to the emergency department from home, brought in by EMS for evaluation of chest pain    Patient was brought to the emergency room by squad if she was complaining of chest pain at the nursing home.  When asked where her pain was patient points to her left flank and abdomen.  When asked about chest pain the patient again points to her left flank and abdomen saying this is where I hurt.  Patient does not have any shortness of breath no nausea no vomiting no lightheadedness no dizziness.  Patient has any diarrhea or dysuria.  Patient reports she had a history of recurrent kidney stones as well as perinephric abscesses.  Patient reports recently have a drain placed by urology.  Patient says that the drainage from the drain has been bloody since it was placed and there has been no change in output.      The patient has no other acute complaints at this time.    ROS Negative Except as Documented Above.    PASTMEDICAL HISTORY     Past Medical History:   Diagnosis Date    Allergic rhinitis     Arthritis     back, arms, hips    Bipolar 1 disorder (HCC)     Cancer ([facility]) 2011    cancerous polyps removed - Dr. Silva    Cataract     Colon polyps     COPD (chronic obstructive pulmonary disease) ([facility]) 07/24/2014    Coronary vasospasm ([facility]) 08/17/2019    Depression     Diverticulitis of colon     GERD (gastroesophageal reflux disease)     Glaucoma     Hearing loss     Hiatal hernia     History of arterial ischemic stroke 07/20/2019    History of colonoscopy 2002    History of kidney stones     Hx of blood clots 06/17/2014    PE and

## 2024-06-09 NOTE — DISCHARGE INSTRUCTIONS
No evidence of heart attack on today's labs or imaging.  We suspect your flank pain is related to this abscess/hematoma surrounding your kidney which is still draining appropriately.  Recommend following up with your primary care doctor following your visit today.  Follow-up with your urologist as well.  Monitor yourself for symptoms of fever worsening pain lightheadedness dizziness return emerged part if develop any of the symptoms.

## 2024-06-09 NOTE — ED NOTES
Pt updated on repeat blood work. Pt resting in bed, respirations even and unlabored. No needs expressed at this time. Call light within reach. VSS

## 2024-06-09 NOTE — ED TRIAGE NOTES
Pt presents to the ER from [facility] with c/o CP that started around 1100. Pt states she was getting ready to eat lunch when it started. EMS report and EKG was showing STEMI, pt received 4 ASA and 3 Nitro in route. Upon arrival, EKG obtained and STEMI not seen or paged. Pt also received a Norco at 1200 at the nursing home. Pt is alert and oriented, respirations even and unlabored. VSS

## 2024-06-09 NOTE — ED NOTES
Pt updated on POC. Pt medicated per MAR. Urine specimen obtained via straight cath. No needs expressed at this time. Respirations even and unlabored, call light within reach. VSS

## 2024-06-25 ENCOUNTER — OFFICE VISIT (OUTPATIENT)
Dept: UROLOGY | Age: 69
End: 2024-06-25
Payer: MEDICARE

## 2024-06-25 VITALS — HEIGHT: 64 IN | RESPIRATION RATE: 17 BRPM | WEIGHT: 172 LBS | BODY MASS INDEX: 29.37 KG/M2

## 2024-06-25 DIAGNOSIS — N39.3 STRESS INCONTINENCE IN FEMALE: ICD-10-CM

## 2024-06-25 DIAGNOSIS — R82.90 ABNORMAL URINALYSIS: ICD-10-CM

## 2024-06-25 DIAGNOSIS — N39.41 URGE INCONTINENCE OF URINE: ICD-10-CM

## 2024-06-25 DIAGNOSIS — S37.012D HEMATOMA OF LEFT KIDNEY, SUBSEQUENT ENCOUNTER: Primary | ICD-10-CM

## 2024-06-25 DIAGNOSIS — N32.81 OAB (OVERACTIVE BLADDER): Primary | ICD-10-CM

## 2024-06-25 DIAGNOSIS — N15.1 PERINEPHRIC ABSCESS: ICD-10-CM

## 2024-06-25 DIAGNOSIS — N20.0 NEPHROLITHIASIS: ICD-10-CM

## 2024-06-25 DIAGNOSIS — R35.0 URINARY FREQUENCY: ICD-10-CM

## 2024-06-25 LAB
BILIRUBIN, URINE: NEGATIVE
BLOOD URINE, POC: ABNORMAL
CHARACTER, URINE: CLEAR
COLOR: YELLOW
GLUCOSE URINE: NEGATIVE MG/DL
KETONES, URINE: NEGATIVE
LEUKOCYTE CLUMPS, URINE: ABNORMAL
NITRITE, URINE: NEGATIVE
PH, URINE: 6 (ref 5–9)
PROTEIN, URINE: NEGATIVE MG/DL
SPECIFIC GRAVITY UA: >= 1.03 (ref 1–1.03)
UROBILINOGEN, URINE: 1 EU/DL (ref 0–1)

## 2024-06-25 PROCEDURE — 0509F URINE INCON PLAN DOCD: CPT

## 2024-06-25 PROCEDURE — G8427 DOCREV CUR MEDS BY ELIG CLIN: HCPCS

## 2024-06-25 PROCEDURE — G8400 PT W/DXA NO RESULTS DOC: HCPCS

## 2024-06-25 PROCEDURE — 3017F COLORECTAL CA SCREEN DOC REV: CPT

## 2024-06-25 PROCEDURE — 4004F PT TOBACCO SCREEN RCVD TLK: CPT

## 2024-06-25 PROCEDURE — 1090F PRES/ABSN URINE INCON ASSESS: CPT

## 2024-06-25 PROCEDURE — 81003 URINALYSIS AUTO W/O SCOPE: CPT

## 2024-06-25 PROCEDURE — G8417 CALC BMI ABV UP PARAM F/U: HCPCS

## 2024-06-25 PROCEDURE — 99214 OFFICE O/P EST MOD 30 MIN: CPT

## 2024-06-25 PROCEDURE — 1123F ACP DISCUSS/DSCN MKR DOCD: CPT

## 2024-06-25 RX ORDER — PIOGLITAZONEHYDROCHLORIDE 15 MG/1
15 TABLET ORAL DAILY
COMMUNITY

## 2024-06-25 RX ORDER — INSULIN LISPRO 100 [IU]/ML
INJECTION, SOLUTION INTRAVENOUS; SUBCUTANEOUS
COMMUNITY

## 2024-06-25 NOTE — PATIENT INSTRUCTIONS
Follow-up with IR for a drain exchange  Follow-up with [facility]  Call with questions, comments, or concerns. I recommend going to the ED for further evaluation if you develop fever, chills, nausea, vomiting, chest pain, SOB, or calf pain.

## 2024-06-25 NOTE — PROGRESS NOTES
[facility] [place] [facility]  [facility] UROLOGY  [address]  [address]  [facility] [address]  Dept: [phone]  Loc: [phone]    Visit Date: 6/25/2024        HPI:     HPI  Ms. Lynne is a 68-year-old female that presents in follow-up.     Hx of a L sided perinephric hematoma/abscess. She has had several hospitalization over the last year, with the first being on 1/15/2023. IR has performed multiple procedures to assist with draining this abscess. Presented to [facility] with recurrent L perinephric abscess, third occurrence. CT a/p on 3/18 with previous L retroperitoneal drainage catheter removed and re-accumulation of the L posterior retroperitoneal fluid collection. Treated with zosyn and underwent repeat IR guided drain placement at L retroperitoneum on 3/19. Fluid culture with proteus. Total of 550 cc output at that time. Given recurrent formation of this abscess, patient was seen by [facility] on 4/30/2024. Patient was encouraged to maintain consistent hydration. She was also to get a CTU to assess for connection with urinary system. Future treatment options include IR drainage with [facility] and refractory nephrectomy.      Also with hx of kidney stones. Underwent cystoscopy left ureteroscopy, left holmium laser lithotripsy and stent exchange 1/12/23 with Dr. Lanier. Still with L non-obstructive nephrolithiasis.      Hx of OAB and incontinence. She did have an interstim which was ultimately removed. She is s/p cystoscopy with bladder botox 200 units by Dr. Lanier on 8/22/2023.     Current Outpatient Medications   Medication Sig Dispense Refill    diclofenac sodium (VOLTAREN) 1 % GEL Apply 4 g topically 2 times daily      ferrous sulfate (IRON 325) 325 (65 Fe) MG tablet Take 1 tablet by mouth every 48 hours With breakfast 30 tablet 0    vitamin B-6 (B-6) 50 MG tablet Take 1 tablet by mouth daily 30 tablet 3    docusate sodium (COLACE) 100 MG capsule Take 1 capsule by mouth 2 times

## 2024-07-09 ENCOUNTER — HOSPITAL ENCOUNTER (EMERGENCY)
Age: 69
Discharge: HOME | End: 2024-07-09
Attending: FAMILY MEDICINE
Payer: MEDICARE

## 2024-07-09 ENCOUNTER — APPOINTMENT (OUTPATIENT)
Dept: GENERAL RADIOLOGY | Age: 69
End: 2024-07-09
Payer: MEDICARE

## 2024-07-09 VITALS
TEMPERATURE: 98 F | BODY MASS INDEX: 30.05 KG/M2 | DIASTOLIC BLOOD PRESSURE: 91 MMHG | SYSTOLIC BLOOD PRESSURE: 140 MMHG | OXYGEN SATURATION: 96 % | HEART RATE: 75 BPM | WEIGHT: 176 LBS | HEIGHT: 64 IN | RESPIRATION RATE: 21 BRPM

## 2024-07-09 DIAGNOSIS — R10.9 CHRONIC ABDOMINAL PAIN: ICD-10-CM

## 2024-07-09 DIAGNOSIS — G89.29 CHRONIC ABDOMINAL PAIN: ICD-10-CM

## 2024-07-09 DIAGNOSIS — R07.9 CHEST PAIN, UNSPECIFIED TYPE: Primary | ICD-10-CM

## 2024-07-09 LAB
ALBUMIN SERPL BCG-MCNC: 3.9 G/DL (ref 3.5–5.1)
ALP SERPL-CCNC: 87 U/L (ref 38–126)
ALT SERPL W/O P-5'-P-CCNC: 46 U/L (ref 11–66)
ANION GAP SERPL CALC-SCNC: 14 MEQ/L (ref 8–16)
AST SERPL-CCNC: 57 U/L (ref 5–40)
BASOPHILS ABSOLUTE: 0.1 THOU/MM3 (ref 0–0.1)
BASOPHILS NFR BLD AUTO: 0.6 %
BILIRUB SERPL-MCNC: 0.3 MG/DL (ref 0.3–1.2)
BUN SERPL-MCNC: 12 MG/DL (ref 7–22)
CALCIUM SERPL-MCNC: 10 MG/DL (ref 8.5–10.5)
CHLORIDE SERPL-SCNC: 97 MEQ/L (ref 98–111)
CO2 SERPL-SCNC: 22 MEQ/L (ref 23–33)
CREAT SERPL-MCNC: 0.8 MG/DL (ref 0.4–1.2)
D DIMER PPP IA.FEU-MCNC: < 215 NG/ML FEU (ref 0–500)
DEPRECATED RDW RBC AUTO: 44.2 FL (ref 35–45)
EOSINOPHIL NFR BLD AUTO: 1 %
EOSINOPHILS ABSOLUTE: 0.1 THOU/MM3 (ref 0–0.4)
ERYTHROCYTE [DISTWIDTH] IN BLOOD BY AUTOMATED COUNT: 13.9 % (ref 11.5–14.5)
GFR SERPL CREATININE-BSD FRML MDRD: 81 ML/MIN/1.73M2
GLUCOSE SERPL-MCNC: 259 MG/DL (ref 70–108)
HCT VFR BLD AUTO: 37.8 % (ref 37–47)
HGB BLD-MCNC: 12 GM/DL (ref 12–16)
IMM GRANULOCYTES # BLD AUTO: 0.05 THOU/MM3 (ref 0–0.07)
IMM GRANULOCYTES NFR BLD AUTO: 0.6 %
LACTIC ACID, SEPSIS: 2.2 MMOL/L (ref 0.5–1.9)
LYMPHOCYTES ABSOLUTE: 2.5 THOU/MM3 (ref 1–4.8)
LYMPHOCYTES NFR BLD AUTO: 28.9 %
MCH RBC QN AUTO: 28.1 PG (ref 26–33)
MCHC RBC AUTO-ENTMCNC: 31.7 GM/DL (ref 32.2–35.5)
MCV RBC AUTO: 88.5 FL (ref 81–99)
MONOCYTES ABSOLUTE: 0.7 THOU/MM3 (ref 0.4–1.3)
MONOCYTES NFR BLD AUTO: 8 %
NEUTROPHILS ABSOLUTE: 5.4 THOU/MM3 (ref 1.8–7.7)
NEUTROPHILS NFR BLD AUTO: 60.9 %
NRBC BLD AUTO-RTO: 0 /100 WBC
NT-PROBNP SERPL IA-MCNC: 165 PG/ML (ref 0–124)
OSMOLALITY SERPL CALC.SUM OF ELEC: 275.1 MOSMOL/KG (ref 275–300)
PLATELET # BLD AUTO: 266 THOU/MM3 (ref 130–400)
PMV BLD AUTO: 9.9 FL (ref 9.4–12.4)
POTASSIUM SERPL-SCNC: 4.4 MEQ/L (ref 3.5–5.2)
POTASSIUM SERPL-SCNC: 4.4 MEQ/L (ref 3.5–5.2)
PROT SERPL-MCNC: 7.3 G/DL (ref 6.1–8)
RBC # BLD AUTO: 4.27 MILL/MM3 (ref 4.2–5.4)
SODIUM SERPL-SCNC: 133 MEQ/L (ref 135–145)
TROPONIN, HIGH SENSITIVITY: 10 NG/L (ref 0–12)
WBC # BLD AUTO: 8.8 THOU/MM3 (ref 4.8–10.8)

## 2024-07-09 PROCEDURE — 71045 X-RAY EXAM CHEST 1 VIEW: CPT

## 2024-07-09 PROCEDURE — 84484 ASSAY OF TROPONIN QUANT: CPT

## 2024-07-09 PROCEDURE — 80053 COMPREHEN METABOLIC PANEL: CPT

## 2024-07-09 PROCEDURE — 85025 COMPLETE CBC W/AUTO DIFF WBC: CPT

## 2024-07-09 PROCEDURE — 36415 COLL VENOUS BLD VENIPUNCTURE: CPT

## 2024-07-09 PROCEDURE — 99285 EMERGENCY DEPT VISIT HI MDM: CPT

## 2024-07-09 PROCEDURE — 93005 ELECTROCARDIOGRAM TRACING: CPT | Performed by: FAMILY MEDICINE

## 2024-07-09 PROCEDURE — 85379 FIBRIN DEGRADATION QUANT: CPT

## 2024-07-09 PROCEDURE — 83880 ASSAY OF NATRIURETIC PEPTIDE: CPT

## 2024-07-09 PROCEDURE — 83605 ASSAY OF LACTIC ACID: CPT

## 2024-07-09 PROCEDURE — 6370000000 HC RX 637 (ALT 250 FOR IP): Performed by: FAMILY MEDICINE

## 2024-07-09 RX ORDER — DOXYCYCLINE HYCLATE 100 MG
100 TABLET ORAL 2 TIMES DAILY
Qty: 14 TABLET | Refills: 0 | Status: SHIPPED | OUTPATIENT
Start: 2024-07-09 | End: 2024-07-16

## 2024-07-09 RX ORDER — HYDROCODONE BITARTRATE AND ACETAMINOPHEN 5; 325 MG/1; MG/1
1 TABLET ORAL ONCE
Status: COMPLETED | OUTPATIENT
Start: 2024-07-09 | End: 2024-07-09

## 2024-07-09 RX ADMIN — HYDROCODONE BITARTRATE AND ACETAMINOPHEN 1 TABLET: 5; 325 TABLET ORAL at 20:57

## 2024-07-09 ASSESSMENT — PAIN - FUNCTIONAL ASSESSMENT: PAIN_FUNCTIONAL_ASSESSMENT: 0-10

## 2024-07-09 ASSESSMENT — HEART SCORE: ECG: NON-SPECIFC REPOLARIZATION DISTURBANCE/LBTB/PM

## 2024-07-09 ASSESSMENT — PAIN DESCRIPTION - LOCATION: LOCATION: CHEST

## 2024-07-09 ASSESSMENT — PAIN SCALES - GENERAL: PAINLEVEL_OUTOF10: 8

## 2024-07-09 NOTE — ED PROVIDER NOTES
EMERGENCY DEPARTMENT ENCOUNTER     CHIEF COMPLAINT   Chief Complaint   Patient presents with    Chest Pain    Shortness of Breath        HPI   Elli Coulter is a 69 y.o. female with a hx of COPD, GERD, PE, who presents with chest pain without palpitations and her chronic abdominal pain with known nephrostomy tube, onset was today. The duration has been intermittent. The trigger for the pain is none. Pain was transient without exertional dependence.    Pt denies any dysuria, fever, but is being treated for UTI, purportedly.    PAST MEDICAL & SURGICAL HISTORY   Past Medical History:   Diagnosis Date    Allergic rhinitis     Arthritis     back, arms, hips    Bipolar 1 disorder (HCC)     Cancer (HCC) 2011    cancerous polyps removed - Dr. Silva    Cataract     Colon polyps     COPD (chronic obstructive pulmonary disease) (HCC) 07/24/2014    Coronary vasospasm (HCC) 08/17/2019    Depression     Diverticulitis of colon     GERD (gastroesophageal reflux disease)     Glaucoma     Hearing loss     Hiatal hernia     History of arterial ischemic stroke 07/20/2019    History of colonoscopy 2002    History of kidney stones     Hx of blood clots 06/17/2014    PE and collar bone area after shoulder surgery    Hyperlipidemia     Hypertension     Liver disease     enlarged liver - damaged with alcohol in past but no cirrhosis per patient    Pneumonia 07/24/2014    Seasonal allergies     sneezing    Sexual problems     Suicidal thoughts     2015 admitted to 4E from [facility]    Thyroid disease     Urinary incontinence     Vomiting     Wears dentures     Wears glasses       Past Surgical History:   Procedure Laterality Date    ABDOMEN SURGERY      x4 removed cysts and ovary removed    CARDIAC SURGERY      heart caths, no stents    CARPAL TUNNEL RELEASE Bilateral 2016    CHOLECYSTECTOMY, LAPAROSCOPIC  6/12/15    Dr. Huff    COLONOSCOPY  2011    CT DRAIN SOFT TISSUE ABSCESS  1/11/2024    CT DRAIN SOFT TISSUE ABSCESS 1/11/2024

## 2024-07-09 NOTE — ED TRIAGE NOTES
Pt to the ED via [facility] from the [facility] with c/o chest pain and shortness of breath. Pt states she has had intermittent chest pain for a few days but it worsened today. Pt rates pain 8/10 at this time. PT has 4 ASA and 2 nitro en route. Respirations easy and unlabored. EKG obtained upon arrival.

## 2024-07-10 LAB
EKG ATRIAL RATE: 99 BPM
EKG P AXIS: 63 DEGREES
EKG P-R INTERVAL: 176 MS
EKG Q-T INTERVAL: 372 MS
EKG QRS DURATION: 126 MS
EKG QTC CALCULATION (BAZETT): 477 MS
EKG R AXIS: 0 DEGREES
EKG T AXIS: 137 DEGREES
EKG VENTRICULAR RATE: 99 BPM

## 2024-07-10 PROCEDURE — 93010 ELECTROCARDIOGRAM REPORT: CPT | Performed by: INTERNAL MEDICINE

## 2024-07-10 NOTE — DISCHARGE INSTRUCTIONS
PLEASE CONTINUE TAKING DOXYCYCLINE ANTIBIOTICS FOR ONE ADDITIONAL WEEK TO TREAT A POSSIBLE URINARY TRACT INFECTION.    SEE YOUR NEPHROLOGIST SOON REGARDING YOUR NEPHROSTOMY TUBE.    RETURN IF WORSE

## 2024-07-10 NOTE — ED NOTES
Writer and El nurse extern into pt room per RN request to perform incontinent care. Linens changed. External catheter placed at this time.  Pt alert and helpful. Pt rolled self.  Pt returned to comfortable position. Call light within reach. No further needs voiced.

## 2024-07-23 ENCOUNTER — HOSPITAL ENCOUNTER (OUTPATIENT)
Dept: INTERVENTIONAL RADIOLOGY/VASCULAR | Age: 69
Discharge: HOME | End: 2024-07-23

## 2024-07-23 ENCOUNTER — TELEPHONE (OUTPATIENT)
Dept: UROLOGY | Age: 69
End: 2024-07-23

## 2024-07-23 VITALS
RESPIRATION RATE: 16 BRPM | TEMPERATURE: 97.6 F | HEART RATE: 73 BPM | SYSTOLIC BLOOD PRESSURE: 177 MMHG | OXYGEN SATURATION: 92 % | DIASTOLIC BLOOD PRESSURE: 73 MMHG

## 2024-07-23 DIAGNOSIS — N15.1 PERINEPHRIC ABSCESS: ICD-10-CM

## 2024-07-23 DIAGNOSIS — S37.012D HEMATOMA OF LEFT KIDNEY, SUBSEQUENT ENCOUNTER: ICD-10-CM

## 2024-07-23 NOTE — TELEPHONE ENCOUNTER
Spoke with IR regarding request for L nephrostomy tube. Exchange scheduled for 7/23/2024. Chart review notes that the patient has a follow-up with [facility] on 7/26/2024.   Will leave L nephrostomy tube in place and have her follow-up with [facility] for further evaluation and management of this area.

## 2024-07-23 NOTE — PROGRESS NOTES
1110  Mercedes was wheeled into room via wheelchair with her niece for abscess drain tube exchange. Pt rights and responsibilities offered to her.  --dr. Clemens wants tube removed, I talked with Alondra at urology office and she states Mary Ellen Flor wants to just replaced.        Mary Ellen Ray talked with Melissa in IR and she wants [facility] to decide what to do with Tube on Friday when she has an appt with them.     Nothing was done for Mercedes today. Her niece wheeled her out via wheelchair for d/c.     SREEKANTH HER NIECE            _M___ Safety:       (Environmental)  Pollard to environment  Ensure ID band is correct and in place/ allergy band as needed  Assess for fall risk  Initiate fall precautions as applicable (fall band, side rails, etc.)  Call light within reach  Bed in low position/ wheels locked    _M___ Pain:       Assess pain level and characteristics  Administer analgesics as ordered  Assess effectiveness of pain management and report to MD as needed    _M___ Knowledge Deficit:  Assess baseline knowledge  Provide teaching at level of understanding  Provide teaching via preferred learning method  Evaluate teaching effectiveness    __M__ Hemodynamic/Respiratory Status:       (Pre and Post Procedure Monitoring)  Assess/Monitor vital signs and LOC  Assess Baseline SpO2 prior to any sedation  Obtain weight/height  Assess vital signs/ LOC until patient meets discharge criteria  Monitor procedure site and notify MD of any issues

## 2024-07-28 NOTE — ED PROVIDER NOTES
AEPB INHALER    1 puff 2 times daily     VITAMIN B-12 (CYANOCOBALAMIN) 500 MCG TABLET    Take 500 mcg by mouth daily       ALLERGIES     is allergic to seasonal.    FAMILY HISTORY     indicated that her mother is . She indicated that her father is . She indicated that her sister is alive. She indicated that her brother is alive. She indicated that the status of her maternal grandmother is unknown. She indicated that the status of her maternal grandfather is unknown. She indicated that the status of her paternal grandmother is unknown. She indicated that the status of her paternal grandfather is unknown. She indicated that the status of her maternal uncle is unknown.    family history includes Cancer in her father, mother, and sister; Depression in her father; Diabetes in her maternal grandmother; Early Death in her maternal grandfather; Heart Attack in her sister; Heart Disease in her father, maternal grandfather, maternal grandmother, maternal uncle, mother, paternal grandfather, and paternal grandmother; High Blood Pressure in her mother; High Cholesterol in her father, maternal grandfather, maternal grandmother, maternal uncle, mother, paternal grandfather, and paternal grandmother; Mental Illness in her father; Stroke in her maternal grandfather. SOCIAL HISTORY      reports that she has been smoking Cigarettes. She started smoking about 40 years ago. She has a 30.00 pack-year smoking history. She has never used smokeless tobacco. She reports that she uses drugs, including Cocaine, about 1 time per week. She reports that she does not drink alcohol. PHYSICAL EXAM     INITIAL VITALS:  height is 5' 4\" (1.626 m) and weight is 170 lb (77.1 kg). Her oral temperature is 98.5 °F (36.9 °C). Her blood pressure is 107/71 and her pulse is 74. Her respiration is 18 and oxygen saturation is 97%. Physical Exam   Constitutional: She is oriented to person, place, and time.  She appears well-developed and
Statement Selected

## 2024-07-29 ENCOUNTER — HOSPITAL ENCOUNTER (EMERGENCY)
Age: 69
Discharge: HOME | End: 2024-07-30
Payer: MEDICARE

## 2024-07-29 DIAGNOSIS — T83.022A NEPHROSTOMY TUBE DISPLACED (HCC): Primary | ICD-10-CM

## 2024-07-29 DIAGNOSIS — R10.9 LEFT FLANK PAIN: ICD-10-CM

## 2024-07-29 PROCEDURE — 99284 EMERGENCY DEPT VISIT MOD MDM: CPT

## 2024-07-29 ASSESSMENT — PAIN DESCRIPTION - ORIENTATION: ORIENTATION: LEFT

## 2024-07-29 ASSESSMENT — PAIN - FUNCTIONAL ASSESSMENT: PAIN_FUNCTIONAL_ASSESSMENT: 0-10

## 2024-07-29 ASSESSMENT — PAIN DESCRIPTION - LOCATION: LOCATION: FLANK

## 2024-07-29 ASSESSMENT — PAIN SCALES - GENERAL: PAINLEVEL_OUTOF10: 9

## 2024-07-30 ENCOUNTER — APPOINTMENT (OUTPATIENT)
Dept: CT IMAGING | Age: 69
End: 2024-07-30
Payer: MEDICARE

## 2024-07-30 VITALS
TEMPERATURE: 98.2 F | OXYGEN SATURATION: 94 % | WEIGHT: 160 LBS | BODY MASS INDEX: 27.31 KG/M2 | HEIGHT: 64 IN | RESPIRATION RATE: 16 BRPM | SYSTOLIC BLOOD PRESSURE: 122 MMHG | HEART RATE: 81 BPM | DIASTOLIC BLOOD PRESSURE: 72 MMHG

## 2024-07-30 LAB
ALBUMIN SERPL BCG-MCNC: 4.1 G/DL (ref 3.5–5.1)
ALP SERPL-CCNC: 79 U/L (ref 38–126)
ALT SERPL W/O P-5'-P-CCNC: 42 U/L (ref 11–66)
ANION GAP SERPL CALC-SCNC: 11 MEQ/L (ref 8–16)
AST SERPL-CCNC: 32 U/L (ref 5–40)
BACTERIA URNS QL MICRO: ABNORMAL /HPF
BASOPHILS ABSOLUTE: 0.1 THOU/MM3 (ref 0–0.1)
BASOPHILS NFR BLD AUTO: 0.6 %
BILIRUB SERPL-MCNC: 0.3 MG/DL (ref 0.3–1.2)
BILIRUB UR QL STRIP.AUTO: NEGATIVE
BUN SERPL-MCNC: 14 MG/DL (ref 7–22)
CALCIUM SERPL-MCNC: 10.4 MG/DL (ref 8.5–10.5)
CASTS #/AREA URNS LPF: ABNORMAL /LPF
CASTS 2: ABNORMAL /LPF
CHARACTER UR: CLEAR
CHLORIDE SERPL-SCNC: 96 MEQ/L (ref 98–111)
CO2 SERPL-SCNC: 25 MEQ/L (ref 23–33)
COLOR, UA: YELLOW
CREAT SERPL-MCNC: 0.7 MG/DL (ref 0.4–1.2)
CRYSTALS URNS MICRO: ABNORMAL
DEPRECATED RDW RBC AUTO: 44.4 FL (ref 35–45)
EOSINOPHIL NFR BLD AUTO: 1 %
EOSINOPHILS ABSOLUTE: 0.1 THOU/MM3 (ref 0–0.4)
EPITHELIAL CELLS, UA: ABNORMAL /HPF
ERYTHROCYTE [DISTWIDTH] IN BLOOD BY AUTOMATED COUNT: 13.9 % (ref 11.5–14.5)
GFR SERPL CREATININE-BSD FRML MDRD: > 90 ML/MIN/1.73M2
GLUCOSE SERPL-MCNC: 267 MG/DL (ref 70–108)
GLUCOSE UR QL STRIP.AUTO: NEGATIVE MG/DL
HCT VFR BLD AUTO: 36.8 % (ref 37–47)
HGB BLD-MCNC: 11.8 GM/DL (ref 12–16)
HGB UR QL STRIP.AUTO: ABNORMAL
IMM GRANULOCYTES # BLD AUTO: 0.05 THOU/MM3 (ref 0–0.07)
IMM GRANULOCYTES NFR BLD AUTO: 0.5 %
KETONES UR QL STRIP.AUTO: NEGATIVE
LYMPHOCYTES ABSOLUTE: 3.2 THOU/MM3 (ref 1–4.8)
LYMPHOCYTES NFR BLD AUTO: 32.6 %
MCH RBC QN AUTO: 28.4 PG (ref 26–33)
MCHC RBC AUTO-ENTMCNC: 32.1 GM/DL (ref 32.2–35.5)
MCV RBC AUTO: 88.5 FL (ref 81–99)
MISCELLANEOUS 2: ABNORMAL
MONOCYTES ABSOLUTE: 0.8 THOU/MM3 (ref 0.4–1.3)
MONOCYTES NFR BLD AUTO: 8.1 %
NEUTROPHILS ABSOLUTE: 5.5 THOU/MM3 (ref 1.8–7.7)
NEUTROPHILS NFR BLD AUTO: 57.2 %
NITRITE UR QL STRIP: NEGATIVE
NRBC BLD AUTO-RTO: 0 /100 WBC
OSMOLALITY SERPL CALC.SUM OF ELEC: 274.4 MOSMOL/KG (ref 275–300)
PH UR STRIP.AUTO: 6 [PH] (ref 5–9)
PLATELET # BLD AUTO: 298 THOU/MM3 (ref 130–400)
PMV BLD AUTO: 9.2 FL (ref 9.4–12.4)
POTASSIUM SERPL-SCNC: 4.5 MEQ/L (ref 3.5–5.2)
PROT SERPL-MCNC: 7.1 G/DL (ref 6.1–8)
PROT UR STRIP.AUTO-MCNC: NEGATIVE MG/DL
RBC # BLD AUTO: 4.16 MILL/MM3 (ref 4.2–5.4)
RBC URINE: ABNORMAL /HPF
RENAL EPI CELLS #/AREA URNS HPF: ABNORMAL /[HPF]
SODIUM SERPL-SCNC: 132 MEQ/L (ref 135–145)
SP GR UR REFRACT.AUTO: 1.01 (ref 1–1.03)
UROBILINOGEN, URINE: 0.2 EU/DL (ref 0–1)
WBC # BLD AUTO: 9.7 THOU/MM3 (ref 4.8–10.8)
WBC #/AREA URNS HPF: ABNORMAL /HPF
WBC #/AREA URNS HPF: ABNORMAL /[HPF]
YEAST LIKE FUNGI URNS QL MICRO: ABNORMAL

## 2024-07-30 PROCEDURE — 51798 US URINE CAPACITY MEASURE: CPT

## 2024-07-30 PROCEDURE — 74176 CT ABD & PELVIS W/O CONTRAST: CPT

## 2024-07-30 PROCEDURE — 6360000002 HC RX W HCPCS: Performed by: PHYSICIAN ASSISTANT

## 2024-07-30 PROCEDURE — 96374 THER/PROPH/DIAG INJ IV PUSH: CPT

## 2024-07-30 PROCEDURE — 87086 URINE CULTURE/COLONY COUNT: CPT

## 2024-07-30 PROCEDURE — 85025 COMPLETE CBC W/AUTO DIFF WBC: CPT

## 2024-07-30 PROCEDURE — 96375 TX/PRO/DX INJ NEW DRUG ADDON: CPT

## 2024-07-30 PROCEDURE — 36415 COLL VENOUS BLD VENIPUNCTURE: CPT

## 2024-07-30 PROCEDURE — 81001 URINALYSIS AUTO W/SCOPE: CPT

## 2024-07-30 PROCEDURE — 96376 TX/PRO/DX INJ SAME DRUG ADON: CPT

## 2024-07-30 PROCEDURE — 80053 COMPREHEN METABOLIC PANEL: CPT

## 2024-07-30 RX ORDER — ONDANSETRON 2 MG/ML
4 INJECTION INTRAMUSCULAR; INTRAVENOUS ONCE
Status: COMPLETED | OUTPATIENT
Start: 2024-07-30 | End: 2024-07-30

## 2024-07-30 RX ORDER — MORPHINE SULFATE 2 MG/ML
2 INJECTION, SOLUTION INTRAMUSCULAR; INTRAVENOUS ONCE
Status: COMPLETED | OUTPATIENT
Start: 2024-07-30 | End: 2024-07-30

## 2024-07-30 RX ORDER — MORPHINE SULFATE 4 MG/ML
4 INJECTION, SOLUTION INTRAMUSCULAR; INTRAVENOUS ONCE
Status: COMPLETED | OUTPATIENT
Start: 2024-07-30 | End: 2024-07-30

## 2024-07-30 RX ADMIN — MORPHINE SULFATE 2 MG: 2 INJECTION, SOLUTION INTRAMUSCULAR; INTRAVENOUS at 11:35

## 2024-07-30 RX ADMIN — ONDANSETRON 4 MG: 2 INJECTION INTRAMUSCULAR; INTRAVENOUS at 01:07

## 2024-07-30 RX ADMIN — MORPHINE SULFATE 4 MG: 4 INJECTION, SOLUTION INTRAMUSCULAR; INTRAVENOUS at 01:08

## 2024-07-30 ASSESSMENT — PAIN SCALES - GENERAL
PAINLEVEL_OUTOF10: 4
PAINLEVEL_OUTOF10: 9
PAINLEVEL_OUTOF10: 9
PAINLEVEL_OUTOF10: 4

## 2024-07-30 ASSESSMENT — ENCOUNTER SYMPTOMS
SHORTNESS OF BREATH: 0
DIARRHEA: 0
VOMITING: 0
ABDOMINAL DISTENTION: 1
COUGH: 0
ABDOMINAL PAIN: 1
NAUSEA: 1

## 2024-07-30 ASSESSMENT — PAIN - FUNCTIONAL ASSESSMENT
PAIN_FUNCTIONAL_ASSESSMENT: 0-10
PAIN_FUNCTIONAL_ASSESSMENT: NONE - DENIES PAIN
PAIN_FUNCTIONAL_ASSESSMENT: 0-10
PAIN_FUNCTIONAL_ASSESSMENT: 0-10
PAIN_FUNCTIONAL_ASSESSMENT: NONE - DENIES PAIN

## 2024-07-30 ASSESSMENT — PAIN DESCRIPTION - LOCATION
LOCATION: BACK
LOCATION: FLANK
LOCATION: FLANK

## 2024-07-30 NOTE — DISCHARGE INSTRUCTIONS
Urology is in agreement that nephrostomy tube does not need to be replaced in the abscessed area it was not in the kidney originally or now.  IR recommends a follow-up CT in 2 weeks to see if the area has enlarged any that needs drained.  The area at this time is really too small to get much of a tube in it.  I discussed with urologist and interventional radiology.  This will be the plan going forward.  She is also to follow-up with [facility]

## 2024-07-30 NOTE — ED NOTES
Pt resting in bed with eyes closed, respirations even and unlabored. No distress noted. Call light within reach. VSS

## 2024-07-30 NOTE — ED TRIAGE NOTES
Patient presents to ED via EMS from [facility] with chief complaint of nephrostomy tube getting pulled out. Patient reports the tube got pulled out on Saturday and she has been having worsening pain since. Pain rated 9/10 in left flank. Patient reports needing left kidney removed but states she does not have a date for that yet. Site clean dry and dressing intact.

## 2024-07-30 NOTE — ED PROVIDER NOTES
Patient was received from JENN Vivas as a signout.  Patient presented by EMS from [facility] with a displaced nephrostomy tube.  This was apparently displaced on Saturday she been having some worsening pain so the nursing staff sent her here for evaluation.  JENN Hurtado did discuss with urology who is supposed to have the patient set up with IR today through the ED to have nephrostomy tube replaced.  Currently patient is afebrile with fairly normal vital signs  Patient does have some left CVA tenderness otherwise she is resting comfortably in her room  Labs and imaging was evaluated by myself.  IR has already been contacted per JENN Boyd.  Will evaluate patient and plan for discharge after placement      Update: Dr. William from IR called and was concerned if the tube was either in the kidney or the abscess.  She states the abscess is very small too small for a tube really will be difficult to place the tube back in.  She recommended a scan in 2 weeks to see if the area had gotten any bigger.    I contacted urology.  Urology states she is supposed to follow-up with [facility] and we can leave out the tube and follow-up if we think it is too difficult to place.    I will discharge the patient back home.  Have her schedule appointment with urology.  She will be discharged home in stable    ICD-10-CM    1. Nephrostomy tube displaced (HCC)  T83.022A       2. Left flank pain  R10.9              Sergio Bernard PA-C  07/30/24 1128

## 2024-07-30 NOTE — ED PROVIDER NOTES
[place] EMERGENCY DEPT      Pt Name: Elli Coulter  MRN: [id number]  Birthdate 1955  Date of evaluation: 7/29/2024  Provider: Libia Orlando PA-C    CHIEF COMPLAINT       Chief Complaint   Patient presents with    Nephrostomy tube fell out       Nurses Notes reviewed and I agree except as noted in the HPI.      HISTORY OF PRESENT ILLNESS    Elli Coulter is a 69 y.o. female who presents via EMS from nursing home for left flank pain.  Patient was pushing herself up and accidentally pushed down on her nephrostomy tube and it pulled out on 7-27.  Patient has had pain to the area since.  Additionally she has developed abdominal distention, intermittent nausea, chills, clamminess, and headache.  The pain is described as a soreness.  She is having difficulty urinating.  Tonight at 2000 she received a Norco which somewhat helped.  Patient has had the left nephrostomy tube for years and is being evaluated by her urologist Dr. Cronin for possible nephrectomy.  The patient is unsure why she was not sent to the ER sooner but feels she was sent in tonight because \"the nurse was sick of hearing me complaining about being in pain.\"  Patient denies fever, vomiting, bowel changes, chest pain, dyspnea, dizziness, weakness, or other complaints.      REVIEW OF SYSTEMS     Review of Systems   Constitutional:  Positive for chills and diaphoresis. Negative for appetite change and fever.   Respiratory:  Negative for cough and shortness of breath.    Cardiovascular:  Negative for chest pain.   Gastrointestinal:  Positive for abdominal distention, abdominal pain and nausea. Negative for diarrhea and vomiting.   Genitourinary:  Positive for difficulty urinating and flank pain. Negative for dysuria and frequency.   Musculoskeletal:  Negative for gait problem.   Skin:  Negative for rash.   Neurological:  Positive for headaches. Negative for dizziness, weakness and light-headedness.   Psychiatric/Behavioral:  Positive for

## 2024-07-30 NOTE — ED NOTES
Patient assisted to restroom and returned to bed via wheelchair. Patient resting in bed. Respirations easy and unlabored. No distress noted. Call light within reach. Urine sample collected and sent to lab.

## 2024-07-30 NOTE — ED NOTES
Patient resting in bed with eyes closed. Respirations easy and unlabored. No distress noted. Call light within reach.

## 2024-07-30 NOTE — ED NOTES
Patient medicated per MAR. Bladder scan completed: 181 ml found in bladder. Patient resting in bed. Respirations easy and unlabored. No distress noted. Call light within reach.

## 2024-07-30 NOTE — ED NOTES
Patient and family updated on care plan, verbal understanding given. Patient is to be picked up by [facility] at 1155 to be brought back to the [facility]. Patient given water. Patient medicated per MAR. IV removed with no complication. No other needs or concerns at this time. VSS.

## 2024-07-31 LAB
BACTERIA UR CULT: ABNORMAL
ORGANISM: ABNORMAL

## 2024-08-08 ENCOUNTER — HOSPITAL ENCOUNTER (INPATIENT)
Age: 69
LOS: 19 days | Discharge: NURSING, REHAB OR LONG-TERM CARE | DRG: 689 | End: 2024-08-27
Attending: EMERGENCY MEDICINE
Payer: MEDICARE

## 2024-08-08 ENCOUNTER — APPOINTMENT (OUTPATIENT)
Dept: CT IMAGING | Age: 69
DRG: 689 | End: 2024-08-08
Payer: MEDICARE

## 2024-08-08 DIAGNOSIS — N15.1 KIDNEY, PERINEPHRIC ABSCESS: ICD-10-CM

## 2024-08-08 DIAGNOSIS — N15.1 PERINEPHRIC ABSCESS: ICD-10-CM

## 2024-08-08 DIAGNOSIS — K68.12 PSOAS ABSCESS (HCC): Primary | ICD-10-CM

## 2024-08-08 LAB
ALBUMIN SERPL BCG-MCNC: 3.4 G/DL (ref 3.5–5.1)
ALP SERPL-CCNC: 76 U/L (ref 38–126)
ALT SERPL W/O P-5'-P-CCNC: 21 U/L (ref 11–66)
ANION GAP SERPL CALC-SCNC: 13 MEQ/L (ref 8–16)
AST SERPL-CCNC: 21 U/L (ref 5–40)
BACTERIA URNS QL MICRO: ABNORMAL /HPF
BILIRUB SERPL-MCNC: 0.3 MG/DL (ref 0.3–1.2)
BILIRUB UR QL STRIP.AUTO: NEGATIVE
BUN SERPL-MCNC: 10 MG/DL (ref 7–22)
CALCIUM SERPL-MCNC: 9.7 MG/DL (ref 8.5–10.5)
CASTS #/AREA URNS LPF: ABNORMAL /LPF
CASTS 2: ABNORMAL /LPF
CHARACTER UR: CLEAR
CHLORIDE SERPL-SCNC: 93 MEQ/L (ref 98–111)
CO2 SERPL-SCNC: 22 MEQ/L (ref 23–33)
COLOR, UA: YELLOW
CREAT SERPL-MCNC: 0.6 MG/DL (ref 0.4–1.2)
CRYSTALS URNS MICRO: ABNORMAL
DEPRECATED RDW RBC AUTO: 45.1 FL (ref 35–45)
EPITHELIAL CELLS, UA: ABNORMAL /HPF
ERYTHROCYTE [DISTWIDTH] IN BLOOD BY AUTOMATED COUNT: 14.2 % (ref 11.5–14.5)
GFR SERPL CREATININE-BSD FRML MDRD: > 90 ML/MIN/1.73M2
GLUCOSE SERPL-MCNC: 299 MG/DL (ref 70–108)
GLUCOSE UR QL STRIP.AUTO: >= 1000 MG/DL
HCT VFR BLD AUTO: 34.6 % (ref 37–47)
HGB BLD-MCNC: 11.1 GM/DL (ref 12–16)
HGB UR QL STRIP.AUTO: ABNORMAL
KETONES UR QL STRIP.AUTO: NEGATIVE
MCH RBC QN AUTO: 28.3 PG (ref 26–33)
MCHC RBC AUTO-ENTMCNC: 32.1 GM/DL (ref 32.2–35.5)
MCV RBC AUTO: 88.3 FL (ref 81–99)
MISCELLANEOUS 2: ABNORMAL
NITRITE UR QL STRIP: NEGATIVE
OSMOLALITY SERPL CALC.SUM OF ELEC: 267.3 MOSMOL/KG (ref 275–300)
PH UR STRIP.AUTO: 6.5 [PH] (ref 5–9)
PLATELET # BLD AUTO: 335 THOU/MM3 (ref 130–400)
PMV BLD AUTO: 10.1 FL (ref 9.4–12.4)
POTASSIUM SERPL-SCNC: 4.4 MEQ/L (ref 3.5–5.2)
PROT SERPL-MCNC: 7.2 G/DL (ref 6.1–8)
PROT UR STRIP.AUTO-MCNC: NEGATIVE MG/DL
RBC # BLD AUTO: 3.92 MILL/MM3 (ref 4.2–5.4)
RBC URINE: ABNORMAL /HPF
RENAL EPI CELLS #/AREA URNS HPF: ABNORMAL /[HPF]
SODIUM SERPL-SCNC: 128 MEQ/L (ref 135–145)
SP GR UR REFRACT.AUTO: 1.02 (ref 1–1.03)
UROBILINOGEN, URINE: 1 EU/DL (ref 0–1)
WBC # BLD AUTO: 11.3 THOU/MM3 (ref 4.8–10.8)
WBC #/AREA URNS HPF: ABNORMAL /HPF
WBC #/AREA URNS HPF: ABNORMAL /[HPF]
YEAST LIKE FUNGI URNS QL MICRO: ABNORMAL

## 2024-08-08 PROCEDURE — 74177 CT ABD & PELVIS W/CONTRAST: CPT

## 2024-08-08 PROCEDURE — 83935 ASSAY OF URINE OSMOLALITY: CPT

## 2024-08-08 PROCEDURE — 81001 URINALYSIS AUTO W/SCOPE: CPT

## 2024-08-08 PROCEDURE — 1200000003 HC TELEMETRY R&B

## 2024-08-08 PROCEDURE — 87186 SC STD MICRODIL/AGAR DIL: CPT

## 2024-08-08 PROCEDURE — 93005 ELECTROCARDIOGRAM TRACING: CPT

## 2024-08-08 PROCEDURE — 36415 COLL VENOUS BLD VENIPUNCTURE: CPT

## 2024-08-08 PROCEDURE — 99285 EMERGENCY DEPT VISIT HI MDM: CPT

## 2024-08-08 PROCEDURE — 96374 THER/PROPH/DIAG INJ IV PUSH: CPT

## 2024-08-08 PROCEDURE — 87077 CULTURE AEROBIC IDENTIFY: CPT

## 2024-08-08 PROCEDURE — 85027 COMPLETE CBC AUTOMATED: CPT

## 2024-08-08 PROCEDURE — 2580000003 HC RX 258

## 2024-08-08 PROCEDURE — 80053 COMPREHEN METABOLIC PANEL: CPT

## 2024-08-08 PROCEDURE — 96375 TX/PRO/DX INJ NEW DRUG ADDON: CPT

## 2024-08-08 PROCEDURE — 84300 ASSAY OF URINE SODIUM: CPT

## 2024-08-08 PROCEDURE — 99223 1ST HOSP IP/OBS HIGH 75: CPT

## 2024-08-08 PROCEDURE — 6360000004 HC RX CONTRAST MEDICATION: Performed by: EMERGENCY MEDICINE

## 2024-08-08 PROCEDURE — 6360000002 HC RX W HCPCS

## 2024-08-08 PROCEDURE — 6360000002 HC RX W HCPCS: Performed by: EMERGENCY MEDICINE

## 2024-08-08 PROCEDURE — 87086 URINE CULTURE/COLONY COUNT: CPT

## 2024-08-08 PROCEDURE — 87040 BLOOD CULTURE FOR BACTERIA: CPT

## 2024-08-08 RX ORDER — ESCITALOPRAM OXALATE 20 MG/1
20 TABLET ORAL DAILY
Status: DISCONTINUED | OUTPATIENT
Start: 2024-08-09 | End: 2024-08-27 | Stop reason: HOSPADM

## 2024-08-08 RX ORDER — DEXTROSE MONOHYDRATE 100 MG/ML
INJECTION, SOLUTION INTRAVENOUS CONTINUOUS PRN
Status: DISCONTINUED | OUTPATIENT
Start: 2024-08-08 | End: 2024-08-27 | Stop reason: HOSPADM

## 2024-08-08 RX ORDER — FERROUS SULFATE 325(65) MG
325 TABLET ORAL
Status: DISCONTINUED | OUTPATIENT
Start: 2024-08-09 | End: 2024-08-27 | Stop reason: HOSPADM

## 2024-08-08 RX ORDER — CARVEDILOL 3.12 MG/1
3.12 TABLET ORAL 2 TIMES DAILY
Status: DISCONTINUED | OUTPATIENT
Start: 2024-08-09 | End: 2024-08-27 | Stop reason: HOSPADM

## 2024-08-08 RX ORDER — SODIUM CHLORIDE 9 MG/ML
INJECTION, SOLUTION INTRAVENOUS CONTINUOUS
Status: ACTIVE | OUTPATIENT
Start: 2024-08-08 | End: 2024-08-09

## 2024-08-08 RX ORDER — ACETAMINOPHEN 325 MG/1
650 TABLET ORAL EVERY 6 HOURS PRN
Status: DISCONTINUED | OUTPATIENT
Start: 2024-08-08 | End: 2024-08-27 | Stop reason: HOSPADM

## 2024-08-08 RX ORDER — PANTOPRAZOLE SODIUM 40 MG/1
40 TABLET, DELAYED RELEASE ORAL
Status: DISCONTINUED | OUTPATIENT
Start: 2024-08-09 | End: 2024-08-27 | Stop reason: HOSPADM

## 2024-08-08 RX ORDER — ENOXAPARIN SODIUM 100 MG/ML
40 INJECTION SUBCUTANEOUS DAILY
Status: DISCONTINUED | OUTPATIENT
Start: 2024-08-09 | End: 2024-08-27 | Stop reason: HOSPADM

## 2024-08-08 RX ORDER — LEVOTHYROXINE SODIUM 75 UG/1
75 TABLET ORAL
Status: DISCONTINUED | OUTPATIENT
Start: 2024-08-09 | End: 2024-08-27 | Stop reason: HOSPADM

## 2024-08-08 RX ORDER — SODIUM CHLORIDE 9 MG/ML
INJECTION, SOLUTION INTRAVENOUS PRN
Status: DISCONTINUED | OUTPATIENT
Start: 2024-08-08 | End: 2024-08-27 | Stop reason: HOSPADM

## 2024-08-08 RX ORDER — KETOROLAC TROMETHAMINE 30 MG/ML
15 INJECTION, SOLUTION INTRAMUSCULAR; INTRAVENOUS EVERY 6 HOURS PRN
Status: DISPENSED | OUTPATIENT
Start: 2024-08-08 | End: 2024-08-13

## 2024-08-08 RX ORDER — ALBUTEROL SULFATE 0.83 MG/ML
2.5 SOLUTION RESPIRATORY (INHALATION) EVERY 6 HOURS PRN
Status: DISCONTINUED | OUTPATIENT
Start: 2024-08-08 | End: 2024-08-27 | Stop reason: HOSPADM

## 2024-08-08 RX ORDER — SODIUM CHLORIDE 0.9 % (FLUSH) 0.9 %
5-40 SYRINGE (ML) INJECTION EVERY 12 HOURS SCHEDULED
Status: DISCONTINUED | OUTPATIENT
Start: 2024-08-08 | End: 2024-08-27 | Stop reason: HOSPADM

## 2024-08-08 RX ORDER — GLUCAGON 1 MG/ML
1 KIT INJECTION PRN
Status: DISCONTINUED | OUTPATIENT
Start: 2024-08-08 | End: 2024-08-27 | Stop reason: HOSPADM

## 2024-08-08 RX ORDER — FENOFIBRATE 160 MG/1
160 TABLET ORAL DAILY
Status: DISCONTINUED | OUTPATIENT
Start: 2024-08-09 | End: 2024-08-27 | Stop reason: HOSPADM

## 2024-08-08 RX ORDER — IOPAMIDOL 755 MG/ML
80 INJECTION, SOLUTION INTRAVASCULAR
Status: COMPLETED | OUTPATIENT
Start: 2024-08-08 | End: 2024-08-08

## 2024-08-08 RX ORDER — ACETAMINOPHEN 650 MG/1
650 SUPPOSITORY RECTAL EVERY 6 HOURS PRN
Status: DISCONTINUED | OUTPATIENT
Start: 2024-08-08 | End: 2024-08-27 | Stop reason: HOSPADM

## 2024-08-08 RX ORDER — FLUTICASONE PROPIONATE 50 MCG
1 SPRAY, SUSPENSION (ML) NASAL 2 TIMES DAILY
Status: DISCONTINUED | OUTPATIENT
Start: 2024-08-09 | End: 2024-08-27 | Stop reason: HOSPADM

## 2024-08-08 RX ORDER — ONDANSETRON 2 MG/ML
4 INJECTION INTRAMUSCULAR; INTRAVENOUS ONCE
Status: COMPLETED | OUTPATIENT
Start: 2024-08-08 | End: 2024-08-08

## 2024-08-08 RX ORDER — SODIUM CHLORIDE 0.9 % (FLUSH) 0.9 %
5-40 SYRINGE (ML) INJECTION PRN
Status: DISCONTINUED | OUTPATIENT
Start: 2024-08-08 | End: 2024-08-27 | Stop reason: HOSPADM

## 2024-08-08 RX ORDER — INSULIN LISPRO 100 [IU]/ML
0-4 INJECTION, SOLUTION INTRAVENOUS; SUBCUTANEOUS
Status: DISCONTINUED | OUTPATIENT
Start: 2024-08-09 | End: 2024-08-21

## 2024-08-08 RX ORDER — LIDOCAINE 4 G/G
2 PATCH TOPICAL DAILY
Status: DISCONTINUED | OUTPATIENT
Start: 2024-08-09 | End: 2024-08-27 | Stop reason: HOSPADM

## 2024-08-08 RX ORDER — BUPROPION HYDROCHLORIDE 300 MG/1
300 TABLET ORAL EVERY MORNING
Status: DISCONTINUED | OUTPATIENT
Start: 2024-08-09 | End: 2024-08-27 | Stop reason: HOSPADM

## 2024-08-08 RX ORDER — ATORVASTATIN CALCIUM 40 MG/1
40 TABLET, FILM COATED ORAL NIGHTLY
Status: DISCONTINUED | OUTPATIENT
Start: 2024-08-09 | End: 2024-08-27 | Stop reason: HOSPADM

## 2024-08-08 RX ORDER — INSULIN LISPRO 100 [IU]/ML
0-4 INJECTION, SOLUTION INTRAVENOUS; SUBCUTANEOUS NIGHTLY
Status: DISCONTINUED | OUTPATIENT
Start: 2024-08-08 | End: 2024-08-21

## 2024-08-08 RX ORDER — TRAZODONE HYDROCHLORIDE 100 MG/1
200 TABLET ORAL NIGHTLY
Status: DISCONTINUED | OUTPATIENT
Start: 2024-08-09 | End: 2024-08-27 | Stop reason: HOSPADM

## 2024-08-08 RX ADMIN — ONDANSETRON 4 MG: 2 INJECTION INTRAMUSCULAR; INTRAVENOUS at 21:56

## 2024-08-08 RX ADMIN — PIPERACILLIN AND TAZOBACTAM 4500 MG: 4; .5 INJECTION, POWDER, FOR SOLUTION INTRAVENOUS at 21:29

## 2024-08-08 RX ADMIN — IOPAMIDOL 80 ML: 755 INJECTION, SOLUTION INTRAVENOUS at 19:48

## 2024-08-08 RX ADMIN — HYDROMORPHONE HYDROCHLORIDE 0.5 MG: 1 INJECTION, SOLUTION INTRAMUSCULAR; INTRAVENOUS; SUBCUTANEOUS at 21:57

## 2024-08-08 RX ADMIN — HYDROMORPHONE HYDROCHLORIDE 0.5 MG: 1 INJECTION, SOLUTION INTRAMUSCULAR; INTRAVENOUS; SUBCUTANEOUS at 18:54

## 2024-08-08 ASSESSMENT — PAIN SCALES - GENERAL: PAINLEVEL_OUTOF10: 10

## 2024-08-08 ASSESSMENT — PAIN - FUNCTIONAL ASSESSMENT
PAIN_FUNCTIONAL_ASSESSMENT: 0-10
PAIN_FUNCTIONAL_ASSESSMENT: NONE - DENIES PAIN
PAIN_FUNCTIONAL_ASSESSMENT: NONE - DENIES PAIN

## 2024-08-08 ASSESSMENT — PAIN DESCRIPTION - ORIENTATION: ORIENTATION: LEFT

## 2024-08-08 ASSESSMENT — PAIN DESCRIPTION - LOCATION: LOCATION: ABDOMEN

## 2024-08-08 NOTE — ED NOTES
Presents to ED with complaints of left side flank/abdominal pain that began 3 hours PTA. PT states she has been seen for this pain before. States she follows with a urologist.

## 2024-08-08 NOTE — ED PROVIDER NOTES
Attending Supervising Physician's Attestation Statement  I performed a history and physical examination on the patient and discussed the management with the resident physician at 1740. I reviewed and agree with the findings and plan as documented in the resident physician note. This includes all diagnostic interpretations and treatment plans as written. I was present for the key portion of any procedures performed and the inclusive time noted in any critical care statement. Except as noted below.     Brief H&P   This patient is a 69 y.o. Female with left-sided flank and back pain.  Patient has a known subcapsular kidney abscess, for which she has been followed by urology.  She currently resides in an Count [facility] the [facility].  She states over the last few days she is having increasing pain in the left flank rating down into the left groin.  She has had associated nausea, but no vomiting.  Patient also notes that she has had some black tarry stools.    On my examination, awake and alert 69-year-old female.  She appears uncomfortable    HEENT: Normocephalic, Atraumatic. Neck is supple without TTP.   Lungs: Clear and equal bilaterally. No increased work of breathing or respiratory distress.  Heart: Rate and rhythm regular, no murmurs clicks or gallops  Abdomen: Soft non-tender, and non-distended abdomen. No rigidity. No Guarding.   Lower extremities: No edema, no tenderness to palpation.   Neuro: Awake and alert, no lateralizing deficits, cranial nerves II through XII grossly intact bilaterally.  Gait steady and without difficulty  Back: c/o pain in left flank, no rashes/lesions.       Diagnostics and Treatments      LABS / RADIOLOGY:     Labs Reviewed   CBC - Abnormal; Notable for the following components:       Result Value    WBC 11.3 (*)     RBC 3.92 (*)     Hemoglobin 11.1 (*)     Hematocrit 34.6 (*)     MCHC 32.1 (*)     RDW-SD 45.1 (*)     All other components within normal limits   COMPREHENSIVE METABOLIC PANEL - Abnormal; Notable

## 2024-08-08 NOTE — ED PROVIDER NOTES
[facility] EMERGENCY DEPT  EMERGENCY DEPARTMENT ENCOUNTER          Pt Name: Elli Coulter  MRN: [id number]  Birthdate 1955  Date of evaluation: 8/8/2024  Physician: Mary Ellen Yan DO  Supervising Attending Physician: Sharri Jauregui MD       CHIEF COMPLAINT       Chief Complaint   Patient presents with    Abdominal Pain         HISTORY OF PRESENT ILLNESS    HPI  Elli Coulter is a 69 y.o. female with PMH of kidney stones, left subcapsular kidney abscess, urinary incontinence, colon hyperplastic polyp, gastritis, GI bleed who presents to the emergency department from nursing home, by private vehicle for evaluation of worsening left leg pain for the past 3 days. Patient states that for the past few days has been having constant left flank pain 9/10 radiating into left leg as well as clamminess and diaphoresis. She reports fevers but does not know how high the temp was. She also had nausea last night but does not have any right now.  She reports diarrhea that started today. She also reports black tarry stools and dizziness the past month. She denies dysuria. She reports that she had the nephrostomy tube chronically which she accidentally displaced and removed a few weeks ago. Per her chart, she has had multiple hospitalizations and IR procedures to drain her abscess. She follows with urology [facility] as well as [facility]. There are plans for future left refractory nephrectomy. She does not know if she has finished or is still on any antibiotics as these are often given to her by the nursing home.      The patient has no other acute complaints at this time.      REVIEW OF SYSTEMS   Review of Systems      PAST MEDICAL AND SURGICAL HISTORY     Past Medical History:   Diagnosis Date    Allergic rhinitis     Arthritis     back, arms, hips    Bipolar 1 disorder (HCC)     Cancer (HCC) 2011    cancerous polyps removed - Dr. Silva    Cataract     Colon polyps     COPD (chronic obstructive

## 2024-08-09 ENCOUNTER — APPOINTMENT (OUTPATIENT)
Dept: CT IMAGING | Age: 69
DRG: 689 | End: 2024-08-09
Payer: MEDICARE

## 2024-08-09 PROBLEM — K68.12 PSOAS ABSCESS (HCC): Status: ACTIVE | Noted: 2024-08-09

## 2024-08-09 PROBLEM — Z87.09 HISTORY OF COPD: Status: ACTIVE | Noted: 2024-08-09

## 2024-08-09 LAB
ANION GAP SERPL CALC-SCNC: 8 MEQ/L (ref 8–16)
BACTERIA UR CULT: ABNORMAL
BASOPHILS ABSOLUTE: 0 THOU/MM3 (ref 0–0.1)
BASOPHILS NFR BLD AUTO: 0.5 %
BUN SERPL-MCNC: 10 MG/DL (ref 7–22)
CALCIUM SERPL-MCNC: 10 MG/DL (ref 8.5–10.5)
CHLORIDE SERPL-SCNC: 98 MEQ/L (ref 98–111)
CO2 SERPL-SCNC: 26 MEQ/L (ref 23–33)
CREAT SERPL-MCNC: 0.7 MG/DL (ref 0.4–1.2)
DEPRECATED MEAN GLUCOSE BLD GHB EST-ACNC: 189 MG/DL (ref 70–126)
DEPRECATED RDW RBC AUTO: 45.9 FL (ref 35–45)
EKG ATRIAL RATE: 77 BPM
EKG P AXIS: 47 DEGREES
EKG P-R INTERVAL: 110 MS
EKG Q-T INTERVAL: 394 MS
EKG QRS DURATION: 84 MS
EKG QTC CALCULATION (BAZETT): 445 MS
EKG R AXIS: -7 DEGREES
EKG T AXIS: 82 DEGREES
EKG VENTRICULAR RATE: 77 BPM
EOSINOPHIL NFR BLD AUTO: 0.8 %
EOSINOPHILS ABSOLUTE: 0.1 THOU/MM3 (ref 0–0.4)
ERYTHROCYTE [DISTWIDTH] IN BLOOD BY AUTOMATED COUNT: 14.2 % (ref 11.5–14.5)
GFR SERPL CREATININE-BSD FRML MDRD: > 90 ML/MIN/1.73M2
GLUCOSE BLD STRIP.AUTO-MCNC: 155 MG/DL (ref 70–108)
GLUCOSE BLD STRIP.AUTO-MCNC: 182 MG/DL (ref 70–108)
GLUCOSE BLD STRIP.AUTO-MCNC: 190 MG/DL (ref 70–108)
GLUCOSE BLD STRIP.AUTO-MCNC: 199 MG/DL (ref 70–108)
GLUCOSE SERPL-MCNC: 185 MG/DL (ref 70–108)
HBA1C MFR BLD HPLC: 8.3 % (ref 4.4–6.4)
HCT VFR BLD AUTO: 34.9 % (ref 37–47)
HGB BLD-MCNC: 11 GM/DL (ref 12–16)
IMM GRANULOCYTES # BLD AUTO: 0.04 THOU/MM3 (ref 0–0.07)
IMM GRANULOCYTES NFR BLD AUTO: 0.4 %
LYMPHOCYTES ABSOLUTE: 1.4 THOU/MM3 (ref 1–4.8)
LYMPHOCYTES NFR BLD AUTO: 13.7 %
MCH RBC QN AUTO: 27.9 PG (ref 26–33)
MCHC RBC AUTO-ENTMCNC: 31.5 GM/DL (ref 32.2–35.5)
MCV RBC AUTO: 88.6 FL (ref 81–99)
MONOCYTES ABSOLUTE: 1 THOU/MM3 (ref 0.4–1.3)
MONOCYTES NFR BLD AUTO: 10.1 %
NEUTROPHILS ABSOLUTE: 7.4 THOU/MM3 (ref 1.8–7.7)
NEUTROPHILS NFR BLD AUTO: 74.5 %
NRBC BLD AUTO-RTO: 0 /100 WBC
ORGANISM: ABNORMAL
OSMOLALITY SERPL CALC.SUM OF ELEC: 268.4 MOSMOL/KG (ref 275–300)
OSMOLALITY UR: 424 MOSMOL/KG (ref 250–750)
PLATELET # BLD AUTO: 297 THOU/MM3 (ref 130–400)
PMV BLD AUTO: 9.4 FL (ref 9.4–12.4)
POTASSIUM SERPL-SCNC: 4.3 MEQ/L (ref 3.5–5.2)
RBC # BLD AUTO: 3.94 MILL/MM3 (ref 4.2–5.4)
SODIUM SERPL-SCNC: 126 MEQ/L (ref 135–145)
SODIUM SERPL-SCNC: 132 MEQ/L (ref 135–145)
SODIUM SERPL-SCNC: 132 MEQ/L (ref 135–145)
SODIUM SERPL-SCNC: 133 MEQ/L (ref 135–145)
SODIUM UR-SCNC: 65 MEQ/L
WBC # BLD AUTO: 9.9 THOU/MM3 (ref 4.8–10.8)

## 2024-08-09 PROCEDURE — 1200000003 HC TELEMETRY R&B

## 2024-08-09 PROCEDURE — 87102 FUNGUS ISOLATION CULTURE: CPT

## 2024-08-09 PROCEDURE — 84295 ASSAY OF SERUM SODIUM: CPT

## 2024-08-09 PROCEDURE — 6360000002 HC RX W HCPCS

## 2024-08-09 PROCEDURE — 87070 CULTURE OTHR SPECIMN AEROBIC: CPT

## 2024-08-09 PROCEDURE — 99232 SBSQ HOSP IP/OBS MODERATE 35: CPT | Performed by: NURSE PRACTITIONER

## 2024-08-09 PROCEDURE — 82948 REAGENT STRIP/BLOOD GLUCOSE: CPT

## 2024-08-09 PROCEDURE — 80048 BASIC METABOLIC PNL TOTAL CA: CPT

## 2024-08-09 PROCEDURE — 87075 CULTR BACTERIA EXCEPT BLOOD: CPT

## 2024-08-09 PROCEDURE — 2580000003 HC RX 258

## 2024-08-09 PROCEDURE — 36415 COLL VENOUS BLD VENIPUNCTURE: CPT

## 2024-08-09 PROCEDURE — 93010 ELECTROCARDIOGRAM REPORT: CPT | Performed by: INTERNAL MEDICINE

## 2024-08-09 PROCEDURE — 85025 COMPLETE CBC W/AUTO DIFF WBC: CPT

## 2024-08-09 PROCEDURE — 87077 CULTURE AEROBIC IDENTIFY: CPT

## 2024-08-09 PROCEDURE — 83036 HEMOGLOBIN GLYCOSYLATED A1C: CPT

## 2024-08-09 PROCEDURE — 6370000000 HC RX 637 (ALT 250 FOR IP): Performed by: NURSE PRACTITIONER

## 2024-08-09 PROCEDURE — 87205 SMEAR GRAM STAIN: CPT

## 2024-08-09 PROCEDURE — 99221 1ST HOSP IP/OBS SF/LOW 40: CPT | Performed by: UROLOGY

## 2024-08-09 PROCEDURE — C1769 GUIDE WIRE: HCPCS

## 2024-08-09 PROCEDURE — 0W9H3ZZ DRAINAGE OF RETROPERITONEUM, PERCUTANEOUS APPROACH: ICD-10-PCS | Performed by: RADIOLOGY

## 2024-08-09 PROCEDURE — 87186 SC STD MICRODIL/AGAR DIL: CPT

## 2024-08-09 PROCEDURE — 6370000000 HC RX 637 (ALT 250 FOR IP)

## 2024-08-09 PROCEDURE — 6360000002 HC RX W HCPCS: Performed by: RADIOLOGY

## 2024-08-09 RX ORDER — MIDAZOLAM HYDROCHLORIDE 1 MG/ML
INJECTION INTRAMUSCULAR; INTRAVENOUS PRN
Status: COMPLETED | OUTPATIENT
Start: 2024-08-09 | End: 2024-08-09

## 2024-08-09 RX ORDER — DOXYCYCLINE 100 MG/1
100 CAPSULE ORAL 2 TIMES DAILY
Status: ON HOLD | COMMUNITY
End: 2024-08-27 | Stop reason: HOSPADM

## 2024-08-09 RX ORDER — NITROGLYCERIN 0.4 MG/1
0.4 TABLET SUBLINGUAL
COMMUNITY

## 2024-08-09 RX ORDER — INSULIN GLARGINE 100 [IU]/ML
14 INJECTION, SOLUTION SUBCUTANEOUS NIGHTLY
COMMUNITY

## 2024-08-09 RX ORDER — LOPERAMIDE HCL 2 MG
2 CAPSULE ORAL
COMMUNITY

## 2024-08-09 RX ORDER — HYDROCODONE BITARTRATE AND ACETAMINOPHEN 5; 325 MG/1; MG/1
1 TABLET ORAL EVERY 8 HOURS PRN
COMMUNITY

## 2024-08-09 RX ORDER — FENTANYL CITRATE 50 UG/ML
INJECTION, SOLUTION INTRAMUSCULAR; INTRAVENOUS PRN
Status: COMPLETED | OUTPATIENT
Start: 2024-08-09 | End: 2024-08-09

## 2024-08-09 RX ORDER — HYDROCODONE BITARTRATE AND ACETAMINOPHEN 5; 325 MG/1; MG/1
1 TABLET ORAL EVERY 6 HOURS PRN
Status: DISCONTINUED | OUTPATIENT
Start: 2024-08-09 | End: 2024-08-27 | Stop reason: HOSPADM

## 2024-08-09 RX ADMIN — HYDROMORPHONE HYDROCHLORIDE 0.5 MG: 1 INJECTION, SOLUTION INTRAMUSCULAR; INTRAVENOUS; SUBCUTANEOUS at 02:08

## 2024-08-09 RX ADMIN — HYDROCODONE BITARTRATE AND ACETAMINOPHEN 1 TABLET: 5; 325 TABLET ORAL at 21:30

## 2024-08-09 RX ADMIN — TRAZODONE HYDROCHLORIDE 200 MG: 100 TABLET ORAL at 21:30

## 2024-08-09 RX ADMIN — BUPROPION HYDROCHLORIDE 300 MG: 300 TABLET, EXTENDED RELEASE ORAL at 13:04

## 2024-08-09 RX ADMIN — FENOFIBRATE 160 MG: 160 TABLET ORAL at 13:04

## 2024-08-09 RX ADMIN — TRAZODONE HYDROCHLORIDE 200 MG: 100 TABLET ORAL at 03:12

## 2024-08-09 RX ADMIN — SODIUM CHLORIDE, PRESERVATIVE FREE 10 ML: 5 INJECTION INTRAVENOUS at 22:32

## 2024-08-09 RX ADMIN — ATORVASTATIN CALCIUM 40 MG: 40 TABLET, FILM COATED ORAL at 21:34

## 2024-08-09 RX ADMIN — PIPERACILLIN AND TAZOBACTAM 3375 MG: 3; .375 INJECTION, POWDER, FOR SOLUTION INTRAVENOUS at 03:16

## 2024-08-09 RX ADMIN — CARVEDILOL 3.12 MG: 3.12 TABLET, FILM COATED ORAL at 13:04

## 2024-08-09 RX ADMIN — MIDAZOLAM 1 MG: 1 INJECTION INTRAMUSCULAR; INTRAVENOUS at 10:55

## 2024-08-09 RX ADMIN — ATORVASTATIN CALCIUM 40 MG: 40 TABLET, FILM COATED ORAL at 03:12

## 2024-08-09 RX ADMIN — PANTOPRAZOLE SODIUM 40 MG: 40 TABLET, DELAYED RELEASE ORAL at 05:17

## 2024-08-09 RX ADMIN — ACETAMINOPHEN 650 MG: 325 TABLET ORAL at 13:04

## 2024-08-09 RX ADMIN — SODIUM CHLORIDE: 9 INJECTION, SOLUTION INTRAVENOUS at 00:12

## 2024-08-09 RX ADMIN — LEVOTHYROXINE SODIUM 75 MCG: 0.07 TABLET ORAL at 05:17

## 2024-08-09 RX ADMIN — HYDROCODONE BITARTRATE AND ACETAMINOPHEN 1 TABLET: 5; 325 TABLET ORAL at 14:51

## 2024-08-09 RX ADMIN — CARVEDILOL 3.12 MG: 3.12 TABLET, FILM COATED ORAL at 21:34

## 2024-08-09 RX ADMIN — QUETIAPINE FUMARATE 350 MG: 400 TABLET ORAL at 21:33

## 2024-08-09 RX ADMIN — PIPERACILLIN AND TAZOBACTAM 3375 MG: 3; .375 INJECTION, POWDER, FOR SOLUTION INTRAVENOUS at 13:19

## 2024-08-09 RX ADMIN — FLUTICASONE PROPIONATE 1 SPRAY: 50 SPRAY, METERED NASAL at 21:30

## 2024-08-09 RX ADMIN — ESCITALOPRAM OXALATE 20 MG: 20 TABLET ORAL at 14:51

## 2024-08-09 RX ADMIN — CARVEDILOL 3.12 MG: 3.12 TABLET, FILM COATED ORAL at 03:12

## 2024-08-09 RX ADMIN — PIPERACILLIN AND TAZOBACTAM 3375 MG: 3; .375 INJECTION, POWDER, FOR SOLUTION INTRAVENOUS at 22:02

## 2024-08-09 RX ADMIN — KETOROLAC TROMETHAMINE 15 MG: 30 INJECTION, SOLUTION INTRAMUSCULAR at 21:30

## 2024-08-09 RX ADMIN — PANTOPRAZOLE SODIUM 40 MG: 40 TABLET, DELAYED RELEASE ORAL at 17:07

## 2024-08-09 RX ADMIN — SODIUM CHLORIDE, PRESERVATIVE FREE 10 ML: 5 INJECTION INTRAVENOUS at 00:13

## 2024-08-09 RX ADMIN — FENTANYL CITRATE 50 MCG: 50 INJECTION, SOLUTION INTRAMUSCULAR; INTRAVENOUS at 10:55

## 2024-08-09 ASSESSMENT — PAIN DESCRIPTION - LOCATION
LOCATION: ABDOMEN
LOCATION: ABDOMEN

## 2024-08-09 ASSESSMENT — PAIN SCALES - GENERAL
PAINLEVEL_OUTOF10: 0
PAINLEVEL_OUTOF10: 10
PAINLEVEL_OUTOF10: 5
PAINLEVEL_OUTOF10: 7
PAINLEVEL_OUTOF10: 9
PAINLEVEL_OUTOF10: 0
PAINLEVEL_OUTOF10: 10
PAINLEVEL_OUTOF10: 8
PAINLEVEL_OUTOF10: 6
PAINLEVEL_OUTOF10: 7

## 2024-08-09 ASSESSMENT — PAIN SCALES - WONG BAKER: WONGBAKER_NUMERICALRESPONSE: NO HURT

## 2024-08-09 ASSESSMENT — PAIN DESCRIPTION - DESCRIPTORS: DESCRIPTORS: DISCOMFORT

## 2024-08-09 ASSESSMENT — PAIN DESCRIPTION - ORIENTATION
ORIENTATION: LEFT
ORIENTATION: LEFT;LOWER

## 2024-08-09 NOTE — PROGRESS NOTES
1031 Pt arrived to CT for CT guided abscess drain placement under conscious sedation.   1032 Procedure explained using teach back method. Pt states understanding.  1034 Dr Pablo into assess patient and explain procedure. This procedure has been fully reviewed with the patient and written informed consent has been obtained.   1038 Care turned over to Daphney CHEATHAM RN

## 2024-08-09 NOTE — CARE COORDINATION
Case Management Assessment Initial Evaluation    Date/Time of Evaluation: 2024 8:47 AM  Assessment Completed by: Phoebe Roach RN    If patient is discharged prior to next notation, then this note serves as note for discharge by case management.    Patient Name: Elli Coulter                   YOB: 1955  Diagnosis: Kidney, perinephric abscess [N15.1]  Perinephric abscess [N15.1]  Psoas abscess (HCC) [K68.12]                   Date / Time: 2024  5:04 PM  [facility]     Patient Admission Status: Inpatient   Readmission Risk Low 0-14, Mod 15-19), High > 20: Readmission Risk Score: 22.8    Current PCP: Ryan Montalvo MD  Health Care Decision Makers:   Primary Decision Maker: ONE,NO - Child - [phone]    Secondary Decision Maker (Active): Bailey Trimble - Brother/Sister - [phone]    Additional Case Management Notes: to ED with left-sided flank and back pain.     Procedures:    CT guided drain placement by IR    Imagin/8 CT abd/pelvis:  Left perinephric abscess measures 3.2 x 2.3 x 5.4 cm is larger on   todays exam compared to 2024 and is having increased mass effect upon the left kidney.   Left psoas/lateral abdominal abscess measures 2.8 x 8.9 x 7.9 cm and is increased in size.    Zosyn IV every 8 hrs, Lovenox, IVF stopped post procedure. Urology and hospitalist follows.     Patient Goals/Plan/Treatment Preferences: Mercedes is from [facility] to see and complete initial assessment.

## 2024-08-09 NOTE — PROGRESS NOTES
Hospitalist Progress Note    Patient:  lEli Coulter      Unit/Bed:7K-09/009-A    YOB: 1955    MRN: [id number]       Acct: [id number]     PCP: Ryan Montalvo MD    Date of Admission: 8/8/2024    Assessment/Plan:    1.Recurrent left perinephric abscess   CT abd/pelvis done on 8/8/24 showed: Left perinephric abscess measures 3.2 x 2.3 x 5.4 cm is larger on  todays exam compared to 07/30/2024 and is having increased mass effect  upon the left kidney.Left psoas/lateral abdominal abscess measures 2.8 x 8.9 x 7.9 cm and is  increased in size compared to prior CT  Patient is s/p drain placement today by IR  On IV Zosyn  Blood culture in progress will continue to follow   Continue analgesics prn  Urology on consult appreciate input  Per record  patient has history of chronic nephrostomy tube but became displaced and removed on 7/27/24 and decision was made to leave it out as the abscess was too small per IR.     2. Acute complicated cystitis with hematuria  Continue IV antibiotics  Follow cultures     3. Hypotonic hyponatremia  Current NA level 132 improving   Urine osmolality,urine sodium WNL  Continue IF fluids gentle hydration  Continue to monitor NA      4.Essential Hypertension  Stable  Continue Carvedilol with holding parameters    5.History of COPD  Stable  Continue Albuterol prn    6. NIDDM type 2  Continue insulin sliding scale  Monitor glucose  Hypoglycemia protocol  Actos on hold  Check HgA1C    7.Physical deconditioning   Supportive care   PT/OT  SS on consult  appreciate input, pt resides  in [facility]    8.GERD  Stable  Continue Protonix    9.Hypothyroidism  Stable  Continue Levothyroxine    10.Hyperlipidemia  Continue Atorvastatin    11.Bipolar 1 disorder  Continue home meds    12.Tobacco use disorder  Nicotine patch  Continue to encourage smoking cessation        Anticipated Discharge in : 2-3 days     Active Hospital Problems    Diagnosis Date Noted    Perinephric abscess [N15.1]  HYDROcodone-acetaminophen, sodium chloride flush, sodium chloride, acetaminophen **OR** acetaminophen, ketorolac, HYDROmorphone **OR** HYDROmorphone, glucose, dextrose bolus **OR** dextrose bolus, glucagon (rDNA), dextrose, albuterol      Intake/Output Summary (Last 24 hours) at 8/9/2024 1505  Last data filed at 8/9/2024 0956  Gross per 24 hour   Intake 0 ml   Output --   Net 0 ml       Diet:  ADULT DIET; Regular; 3 carb choices (45 gm/meal)    Exam:  /68   Pulse 67   Temp 97.6 °F (36.4 °C) (Oral)   Resp 18   Ht 1.626 m (5' 4\")   Wt 78.6 kg (173 lb 4.5 oz)   SpO2 (!) 89%   BMI 29.74 kg/m²     General appearance: Awake alert forgetfulNo apparent distress, appears stated age and cooperative.  HEENT: Normocephalic atraumatic Conjunctivae/corneas clear.  Neck: Supple, with full range of motion. No jugular venous distention. Trachea midline.  Respiratory:  Normal respiratory effort. Clear to auscultation, bilaterally without Rales/Wheezes/Rhonchi.  Cardiovascular: Regular rate and rhythm with normal S1/S2 without murmurs, rubs or gallops.  Abdomen: Soft, mild tenderness to drain site left flank, drain in place draining purulent drainage,non-distended with normal bowel sounds.  Musculoskeletal: passive and active ROM x 4 extremities.  Skin: Skin color, texture, turgor normal.  No rashes or lesions.  Neurologic:  Neurovascularly intact without any focal sensory/motor deficit  Psychiatric: Alert and oriented, thought content appropriate, forgetful  Capillary Refill: Brisk,< 3 seconds   Peripheral Pulses: +2 palpable, equal bilaterally       Labs:   Recent Labs     08/08/24 1740 08/09/24  0656   WBC 11.3* 9.9   HGB 11.1* 11.0*   HCT 34.6* 34.9*    297     Recent Labs     08/08/24 1740 08/09/24  0012 08/09/24  0656 08/09/24  0757   * 132* 132* 133*   K 4.4  --  4.3  --    CL 93*  --  98  --    CO2 22*  --  26  --    BUN 10  --  10  --    CREATININE 0.6  --  0.7  --    CALCIUM 9.7  --  10.0  --

## 2024-08-09 NOTE — H&P
Hospitalist History & Physical    Patient:  Elli Coulter    Unit/Bed:21/021A  YOB: 1955  MRN: [id number]   Acct: [id number]   PCP: Ryan Montalvo MD  Code Status: Prior    Date of Service: Pt seen/examined on 08/08/24 and admitted to Inpatient with expected LOS greater than two midnights due to medical therapy.     Chief Complaint: abdominal pain    Assessment/Plan:    Recurrent left perinephric abscess: Complains of left flank pain and back pain, nausea. History of chronic nephrostomy tube but became displaced and removed on 7/27/24 and decision was made to leave it out as the abscess was too small per IR. CT A/P notes a left perinephric abscess measures 3.2x2.3x5.4 cm and larger than previous exam on 7/30/24, having increased mass effect upon the left kidney, left psoas/lateral abdominal abscess measure 2.8x8.9x7.9 cm and is increase in size compared to prior exam. WBC 11.3  NPO at MN  Consult Urology  Consult IR for nephrostomy placement  Follow urine culture  BC x2 pending  Pain control: PRN Toradol, Dilaudid  Continue Zosyn for intra-abdominal infection coverage  Gentle IVF    Hypotonic hyponatremia, chronic: Na 128. Likely related to above.   Continuous IVF  Urine lytes pending  Sodium level Q8H  BMP in AM    Acute complicated cystitis with hematuria: Likely secondary to above. UA shows moderate leukocytes, few bacteria.   Treatment as noted above    COPD:   Continue PRN albuterol    Essential HTN:   Continue carvedilol with holding parameters    NIDDMII: 11/2023 A1c 9.3. On Actos as OP.   Hold home med while IP  Blood glucose ACHS  Low dose SSI  Hypoglycemia protocol  Carb control diet    Hypothyroidism:   Continue leveothyroxine    GERD:   Continue Protonix    Normocytic anemia, chronic: Hgb 11.1. No overt signs of bleeding.   Transfuse if Hgb <7 or hemodynamically unstable  CBC in AM  Continue ferrous sulfate    Bipolar 1 disorder:   Continue Wellbutrin, Lexapro, Seroquel,  Suicidal thoughts     2015 admitted to 4E from [facility]    Thyroid disease     Urinary incontinence     Vomiting     Wears dentures     Wears glasses        Past Surgical History:        Procedure Laterality Date    ABDOMEN SURGERY      x4 removed cysts and ovary removed    CARDIAC SURGERY      heart caths, no stents    CARPAL TUNNEL RELEASE Bilateral 2016    CHOLECYSTECTOMY, LAPAROSCOPIC  6/12/15    Dr. Huff    COLONOSCOPY  2011    CT DRAIN SOFT TISSUE ABSCESS  1/11/2024    CT DRAIN SOFT TISSUE ABSCESS 1/11/2024 STR CT SCAN    CYSTOSCOPY N/A 2/10/2022    CYSTOSCOPY URETHRAL IMPLANT INJECTION performed by Sami Lanier MD at [facility] OR    CYSTOSCOPY N/A 12/27/2022    Cystoscopy Bladder Botox 200 units Left Stent Placement performed by Sami Lanier MD at [facility] OR    CYSTOSCOPY N/A 8/22/2023    CYSTOSCOPY WITH BLADDER BOTOX 200 UNITS performed by Sami Lanier MD at [facility] OR    CYSTOSCOPY W BIOPSY OF BLADDER N/A 7/13/2020    CYSTOSCOPY WITH BLADDER BIOPSIES performed by Chris Avila MD at [facility] OR    DILATATION, ESOPHAGUS      EGD      ELBOW SURGERY Right 10/7/2004    Dr. Cronin    ELBOW SURGERY Bilateral 2016    decompression    HYSTERECTOMY (CERVIX STATUS UNKNOWN)  1998    Dr. Manuel HUTCHINSON with [facility]    MANDIBLE FRACTURE SURGERY  1984    ROTATOR CUFF REPAIR  2004, 2014    right shoulder    SHOULDER SURGERY  2014    right    SKIN BIOPSY  2006    under left eye    STIMULATOR SURGERY N/A 11/4/2020    STAGE 1 INTERSTIM PLACEMENT performed by Chris Avila MD at [facility] OR    STIMULATOR SURGERY N/A 11/23/2020    PLACEMENT OF STAGE II INTERSTIM performed by Chris Avila MD at [facility] OR    STIMULATOR SURGERY N/A 4/14/2022    Removal of Interstim performed by Sami Lanier MD at [facility] OR    URETER SURGERY Left 1/12/2023    Cystoscopy Left Ureteroscopy Laser Lithotripsy Basket Retrieval of Stone Fragments possible Left Ureteral Stent performed by Sami Lanier MD at [facility] OR       Home Medications:   No current

## 2024-08-09 NOTE — PROGRESS NOTES
TRANSFER - OUT REPORT:    Verbal report given to Kathia RN(name) on Elli Coulter being transferred to (unit) for routine progression of patient care       Report consisted of patient's Situation, Background, Assessment and   Recommendations(SBAR).     Information from the following report(s) Nurse Handoff Report, Surgery Report, and MAR was reviewed with the receiving nurse.    Opportunity for questions and clarification was provided.      Patient transported with:   Monitor

## 2024-08-09 NOTE — PROGRESS NOTES
[facility]  SPEECH THERAPY MISSED TREATMENT NOTE  [facility]      Date: 2024  Patient Name: Elli Coulter        MRN: [id number]    : 1955  (69 y.o.)    REASON FOR MISSED TREATMENT:  ST received novel orders from Serenity Spears APRN - CNP to complete [id number] evaluation. RN Kathia approved attempt and reported concerns re: memory. Upon arrival to room, patient sleeping in room. Easily awakened to name; however, patient with refusal to complete evaluation despite education re: rationale for evaluation. ST to re-attempt at a later date/time as patient is medically appropriate and available.     Chantale Issa M.A., CCC-SLP [id number]

## 2024-08-09 NOTE — PLAN OF CARE
Problem: Discharge Planning  Goal: Discharge to home or other facility with appropriate resources  8/9/2024 1032 by Kathia Kim RN  Outcome: Progressing  8/9/2024 0650 by Nubia Barcenas RN  Outcome: Progressing  Flowsheets (Taken 8/8/2024 2343)  Discharge to home or other facility with appropriate resources: Identify barriers to discharge with patient and caregiver     Problem: Pain  Goal: Verbalizes/displays adequate comfort level or baseline comfort level  8/9/2024 1032 by Kathia Kim RN  Outcome: Progressing  Flowsheets (Taken 8/9/2024 0830)  Verbalizes/displays adequate comfort level or baseline comfort level: Encourage patient to monitor pain and request assistance  8/9/2024 0650 by Nubia Barcenas RN  Outcome: Progressing  Flowsheets (Taken 8/8/2024 2330)  Verbalizes/displays adequate comfort level or baseline comfort level: Encourage patient to monitor pain and request assistance     Problem: ABCDS Injury Assessment  Goal: Absence of physical injury  Outcome: Progressing   Care plan reviewed with patient.  Patient verbalizes understanding of the plan of care and contribute to goal setting.

## 2024-08-09 NOTE — PROCEDURES
PROCEDURE NOTE  Date: 8/9/2024   Name: Elli Coulter  YOB: 1955    Procedures        EKG was completed and handed to RN

## 2024-08-09 NOTE — ED NOTES
Spoke to  staff who approved patient transfer to -09. Patient transported upstairs in stable condition.

## 2024-08-09 NOTE — PLAN OF CARE
Problem: Discharge Planning  Goal: Discharge to home or other facility with appropriate resources  Outcome: Progressing  Flowsheets (Taken 8/8/2024 2343)  Discharge to home or other facility with appropriate resources: Identify barriers to discharge with patient and caregiver     Problem: Pain  Goal: Verbalizes/displays adequate comfort level or baseline comfort level  Outcome: Progressing  Flowsheets (Taken 8/8/2024 2330)  Verbalizes/displays adequate comfort level or baseline comfort level: Encourage patient to monitor pain and request assistance

## 2024-08-09 NOTE — H&P
[facility][note 3]  Sedation/Analgesia History & Physical    Pt Name: Elli Coulter  MRN: [id number]  YOB: 1955  Provider Performing Procedure: Anshul Pablo MD, MD  Primary Care Physician: Ryan Montalvo MD    Formulation and discussion of sedation / procedure plans, risks, benefits, side effects and alternatives with patient and/or responsible adult completed.    PRE-PROCEDURE   DNR-CCA/DNR-CC []Yes [x]No  Brief History/Pre-Procedure Diagnosis: Left retroperitoneal abscess          MEDICAL HISTORY  []CAD/Valve  []Liver Disease  []Lung Disease []Diabetes  []Hypertension []Renal Disease  [x]Additional information:       has a past medical history of Allergic rhinitis, Arthritis, Bipolar 1 disorder (HCC), Cancer (HCC), Cataract, Colon polyps, COPD (chronic obstructive pulmonary disease) (HCC), Coronary vasospasm (HCC), Depression, Diverticulitis of colon, GERD (gastroesophageal reflux disease), Glaucoma, Hearing loss, Hiatal hernia, History of arterial ischemic stroke, History of colonoscopy, History of kidney stones, Hx of blood clots, Hyperlipidemia, Hypertension, Liver disease, Pneumonia, Seasonal allergies, Sexual problems, Suicidal thoughts, Thyroid disease, Urinary incontinence, Vomiting, Wears dentures, and Wears glasses.    SURGICAL HISTORY   has a past surgical history that includes Mandible fracture surgery (1984); shoulder surgery (2014); Colonoscopy (2011); Dilatation, esophagus; Elbow surgery (Right, 10/7/2004); Rotator cuff repair (2004, 2014); skin biopsy (2006); Cholecystectomy, laparoscopic (6/12/15); Elbow surgery (Bilateral, 2016); Carpal tunnel release (Bilateral, 2016); Hysterectomy (1998); cystoscopy w biopsy of bladder (N/A, 7/13/2020); Stimulator Surgery (N/A, 11/4/2020); Stimulator Surgery (N/A, 11/23/2020); Abdomen surgery; Cystoscopy (N/A, 2/10/2022); Cardiac surgery; Stimulator Surgery (N/A, 4/14/2022); EGD; Cystoscopy (N/A, 12/27/2022); Ureter surgery (Left,

## 2024-08-09 NOTE — CONSULTS
WBCUA 15-25 08/08/2024 05:45 PM    WBCUA 0-2 04/19/2012 10:10 AM    RBCUA 50-75 08/08/2024 05:45 PM    MUCUS THREADS 09/12/2023 12:35 PM    YEAST NONE SEEN 08/08/2024 05:45 PM    BACTERIA FEW 08/08/2024 05:45 PM    CLARITYU  08/11/2020 12:00 AM      Comment:      Hazy    LEUKOCYTESUR MODERATE 08/08/2024 05:45 PM    UROBILINOGEN 1.0 08/08/2024 05:45 PM    BILIRUBINUR NEGATIVE 08/08/2024 05:45 PM    BLOODU MODERATE 08/08/2024 05:45 PM    GLUCOSEU >= 1000 08/08/2024 05:45 PM    AMORPHOUS DEBRIS 09/12/2023 12:35 PM       Imaging:   The patient has had a CT Without and With Contrast which I have independently reviewed along with its accompanying report.  The study demonstrates Clinical history: Left flank pain     Findings:  Lung bases are clear.  Small hiatal hernia  Enlarged fatty infiltrated liver. Stable hypodense lesion in the right   lobe of the liver likely represents a small cyst  Splenomegaly.  No acute pancreatitis.  Prior cholecystectomy  No choledocholithiasis or biliary obstruction.  Normal adrenal glands.  Right kidney demonstrates normal enhancement. No right renal stones. No   right urinary obstruction.  Left renal stones. No hydronephrosis. No left ureteral calculi or urinary   obstruction  Left perinephric abscess measures 3.2 x 2.3 x 5.4 cm. Abscess appears   larger on todays exam compared to 07/30/2024 and is having increased mass   effect upon the left kidney..  Left psoas/lateral abdominal abscess measures 2.8 x 8.9 x 7.9 cm and is   increased in size compared to prior CT  Edema seen along the prior nephrostomy tube tract in the posterior   subcutaneous fat of the left flank.  No abdominal aortic aneurysm.  No small bowel obstruction  Appendix is normal in caliber  No colitis or diverticulitis.  Urinary bladder does not demonstrate stones or wall thickening.  Prior hysterectomy.  No free fluid  Bilateral femoral head AVN.  Mild multilevel degenerative disease of the lumbar spine.      IMPRESSION:  Impression:  1. Left perinephric abscess measures 3.2 x 2.3 x 5.4 cm is larger on   todays exam compared to 07/30/2024 and is having increased mass effect   upon the left kidney.  2. Left psoas/lateral abdominal abscess measures 2.8 x 8.9 x 7.9 cm and is   increased in size compared to prior CT    IMPRESSION/Plan:    NPO  IR to insert drain - discussed with IR    L perinephric abscess - 3.2 x 2.3 x 5.4 cm, larger than last exam.  IR to place drain  Abd abscess - 2.8 x 8.9 x 7.9 cm and is   increased in size  UTI - Ux pending, cont abx    Can follow up with [facility] at discharge to discuss treatment regarding recurrent perinephric abscess, referral from our office was sent      Thank you for including us in the care of BA Salcedo - CNP, BA  08/09/24 8:16 AM  Urology

## 2024-08-09 NOTE — PROGRESS NOTES
1038 Care assumed from April Janell PUTNAM.  1039 Patient received in CT scanning for pre-procedure drain insertion assessment with family at bedside. Monitor applied.   1040 Patient assisted to CT table and pre scan started.   1057 Procedure started with Dr. Pablo.  1100 Samples collected and sent to lab for further review.   1102 Procedure completed; patient tolerated well. Post scan obtained. Dressing to left flank, stayfix and opsite, no bleeding noted.  1106 Attempted to call report to Kathia PUTNAM - no answer.  1108 Patient on bed; comfort ensured.  1109 Report called to Kathia PUTNAM and patient taken to 7K via bed with family at bedside. Pt alert and oriented x3; follows commands. Skin pink, warm, and dry. Respirations easy, regular, and nonlabored.

## 2024-08-09 NOTE — OP NOTE
Department of Radiology  Post Procedure Progress Note      Pre-Procedure Diagnosis:  Left retroperitoneal abscess    Procedure Performed:  CT guided drain placement    Anesthesia: local / versed and fentanyl    Findings: successful    Immediate Complications:  None    Estimated Blood Loss: minimal    SEE DICTATED PROCEDURE NOTE FOR COMPLETE DETAILS.    Electronically signed by Anshul Pablo MD on 8/9/2024 at 11:04 AM

## 2024-08-09 NOTE — ED NOTES
ED to inpatient nurses report      Chief Complaint:  Chief Complaint   Patient presents with    Abdominal Pain     Present to ED from: Nursing home     MOA:     LOC: alert and orientated to name, place, date  Mobility: Requires assistance * 1  Oxygen Baseline: RA    Current needs required: RA     Code Status:   Full Code    What abnormal results were found and what did you give/do to treat them? See labs   Any procedures or intervention occur? See MAR    Mental Status:  Level of Consciousness: Alert (0)    Psych Assessment:        Vitals:  Patient Vitals for the past 24 hrs:   BP Temp Temp src Pulse Resp SpO2 Height Weight   08/08/24 2200 (!) 146/78 -- -- 82 18 97 % -- --   08/08/24 2157 -- -- -- -- 18 -- -- --   08/08/24 2130 (!) 151/79 -- -- 76 20 96 % -- --   08/08/24 1939 131/68 -- -- 74 20 91 % -- --   08/08/24 1906 (!) 148/71 -- -- 77 17 93 % -- --   08/08/24 1707 (!) 151/74 97.5 °F (36.4 °C) Oral 85 17 96 % 1.626 m (5' 4\") 72.6 kg (160 lb)        LDAs:   Peripheral IV 08/08/24 Posterior;Right Hand (Active)       Ambulatory Status:  Presents to emergency department  because of falls (Syncope, seizure, or loss of consciousness): No, Age > 70: No, Altered Mental Status, Intoxication with alcohol or substance confusion (Disorientation, impaired judgment, poor safety awaremess, or inability to follow instructions): No, Impaired Mobility: Ambulates or transfers with assistive devices or assistance; Unable to ambulate or transer.: Yes, Nursing Judgement: Yes    Diagnosis:  DISPOSITION Admitted 08/08/2024 09:48:45 PM   Final diagnoses:   Psoas abscess (HCC)   Kidney, perinephric abscess        Consults:  IP CONSULT TO UROLOGY     Pain Score:  Pain Assessment  Pain Assessment: None - Denies Pain  Pain Level: 10  Patient's Stated Pain Goal: 4  Pain Location: Abdomen  Pain Orientation: Left    C-SSRS:   Risk of Suicide: No Risk    Sepsis Screening:       Austen Fall Risk:  Anderson 1 Fall Risk  Presents to emergency  INTERSTIM performed by Chris Avila MD at [facility] OR    STIMULATOR SURGERY N/A 4/14/2022    Removal of Interstim performed by Sami Lanier MD at [facility] OR    URETER SURGERY Left 1/12/2023    Cystoscopy Left Ureteroscopy Laser Lithotripsy Basket Retrieval of Stone Fragments possible Left Ureteral Stent performed by Sami Lanier MD at [facility] OR       PAST MEDICAL HISTORY       Past Medical History:   Diagnosis Date    Allergic rhinitis     Arthritis     back, arms, hips    Bipolar 1 disorder (HCC)     Cancer (HCC) 2011    cancerous polyps removed - Dr. Silva    Cataract     Colon polyps     COPD (chronic obstructive pulmonary disease) (HCC) 07/24/2014    Coronary vasospasm (HCC) 08/17/2019    Depression     Diverticulitis of colon     GERD (gastroesophageal reflux disease)     Glaucoma     Hearing loss     Hiatal hernia     History of arterial ischemic stroke 07/20/2019    History of colonoscopy 2002    History of kidney stones     Hx of blood clots 06/17/2014    PE and collar bone area after shoulder surgery    Hyperlipidemia     Hypertension     Liver disease     enlarged liver - damaged with alcohol in past but no cirrhosis per patient    Pneumonia 07/24/2014    Seasonal allergies     sneezing    Sexual problems     Suicidal thoughts     2015 admitted to 4E from [facility]    Thyroid disease     Urinary incontinence     Vomiting     Wears dentures     Wears glasses            Electronically signed by Gloria Moreland RN on 8/8/2024 at 10:04 PM

## 2024-08-09 NOTE — CARE COORDINATION
08/09/24 1427   Service Assessment   Patient Orientation Alert and Oriented   Cognition Alert   History Provided By Patient   Primary Caregiver Self   Support Systems Family Members   Patient's Healthcare Decision Maker is: Named in Scanned ACP Document   PCP Verified by CM Yes   Last Visit to PCP Within last 6 months   Prior Functional Level Assistance with the following:;Shopping;Housework;Cooking   Current Functional Level Assistance with the following:;Shopping;Housework;Cooking   Can patient return to prior living arrangement Unknown at present   Family able to assist with home care needs: No   Would you like for me to discuss the discharge plan with any other family members/significant others, and if so, who? No   Financial Resources Medicare   Community Resources Assisted Living   Social/Functional History   Lives With Alone   Type of Home Assisted living   Discharge Planning   Type of Residence Assisted living   Living Arrangements Alone   Current Services Prior To Admission None   Potential Assistance Needed Skilled Nursing Facility   DME Ordered? No   Potential Assistance Purchasing Medications No   Type of Home Care Services None   Patient expects to be discharged to: Assisted living   Services At/After Discharge   Transition of Care Consult (CM Consult) Assisted Living   Services At/After Discharge Assisted living   Mode of Transport at Discharge [facility]   Condition of Participation: Discharge Planning   The Patient and/or Patient Representative was provided with a Choice of Provider? Patient   The Patient and/Or Patient Representative agree with the Discharge Plan? Yes   Freedom of Choice list was provided with basic dialogue that supports the patient's individualized plan of care/goals, treatment preferences, and shares the quality data associated with the providers?  No  (declined)     Post-acute (PAC) provider list was provided to patient. Patient was informed of their freedom to choose PAC

## 2024-08-10 LAB
ANION GAP SERPL CALC-SCNC: 11 MEQ/L (ref 8–16)
BACTERIA UR CULT: ABNORMAL
BASOPHILS ABSOLUTE: 0 THOU/MM3 (ref 0–0.1)
BASOPHILS NFR BLD AUTO: 0.7 %
BUN SERPL-MCNC: 15 MG/DL (ref 7–22)
CALCIUM SERPL-MCNC: 9.7 MG/DL (ref 8.5–10.5)
CHLORIDE SERPL-SCNC: 96 MEQ/L (ref 98–111)
CO2 SERPL-SCNC: 24 MEQ/L (ref 23–33)
CREAT SERPL-MCNC: 0.9 MG/DL (ref 0.4–1.2)
DEPRECATED RDW RBC AUTO: 45.8 FL (ref 35–45)
EOSINOPHIL NFR BLD AUTO: 1.5 %
EOSINOPHILS ABSOLUTE: 0.1 THOU/MM3 (ref 0–0.4)
ERYTHROCYTE [DISTWIDTH] IN BLOOD BY AUTOMATED COUNT: 14 % (ref 11.5–14.5)
GFR SERPL CREATININE-BSD FRML MDRD: 70 ML/MIN/1.73M2
GLUCOSE BLD STRIP.AUTO-MCNC: 161 MG/DL (ref 70–108)
GLUCOSE BLD STRIP.AUTO-MCNC: 179 MG/DL (ref 70–108)
GLUCOSE BLD STRIP.AUTO-MCNC: 273 MG/DL (ref 70–108)
GLUCOSE SERPL-MCNC: 297 MG/DL (ref 70–108)
HCT VFR BLD AUTO: 35.8 % (ref 37–47)
HGB BLD-MCNC: 11.1 GM/DL (ref 12–16)
IMM GRANULOCYTES # BLD AUTO: 0.02 THOU/MM3 (ref 0–0.07)
IMM GRANULOCYTES NFR BLD AUTO: 0.3 %
LYMPHOCYTES ABSOLUTE: 1.2 THOU/MM3 (ref 1–4.8)
LYMPHOCYTES NFR BLD AUTO: 21 %
MCH RBC QN AUTO: 27.8 PG (ref 26–33)
MCHC RBC AUTO-ENTMCNC: 31 GM/DL (ref 32.2–35.5)
MCV RBC AUTO: 89.5 FL (ref 81–99)
MONOCYTES ABSOLUTE: 0.4 THOU/MM3 (ref 0.4–1.3)
MONOCYTES NFR BLD AUTO: 7 %
NEUTROPHILS ABSOLUTE: 4.1 THOU/MM3 (ref 1.8–7.7)
NEUTROPHILS NFR BLD AUTO: 69.5 %
NRBC BLD AUTO-RTO: 0 /100 WBC
ORGANISM: ABNORMAL
PLATELET # BLD AUTO: 295 THOU/MM3 (ref 130–400)
PMV BLD AUTO: 9.6 FL (ref 9.4–12.4)
POTASSIUM SERPL-SCNC: 4.4 MEQ/L (ref 3.5–5.2)
RBC # BLD AUTO: 4 MILL/MM3 (ref 4.2–5.4)
SODIUM SERPL-SCNC: 129 MEQ/L (ref 135–145)
SODIUM SERPL-SCNC: 130 MEQ/L (ref 135–145)
SODIUM SERPL-SCNC: 131 MEQ/L (ref 135–145)
SODIUM SERPL-SCNC: 133 MEQ/L (ref 135–145)
WBC # BLD AUTO: 5.9 THOU/MM3 (ref 4.8–10.8)

## 2024-08-10 PROCEDURE — 82948 REAGENT STRIP/BLOOD GLUCOSE: CPT

## 2024-08-10 PROCEDURE — 1200000003 HC TELEMETRY R&B

## 2024-08-10 PROCEDURE — 2580000003 HC RX 258

## 2024-08-10 PROCEDURE — 36415 COLL VENOUS BLD VENIPUNCTURE: CPT

## 2024-08-10 PROCEDURE — 80048 BASIC METABOLIC PNL TOTAL CA: CPT

## 2024-08-10 PROCEDURE — 85025 COMPLETE CBC W/AUTO DIFF WBC: CPT

## 2024-08-10 PROCEDURE — 99232 SBSQ HOSP IP/OBS MODERATE 35: CPT | Performed by: NURSE PRACTITIONER

## 2024-08-10 PROCEDURE — 6370000000 HC RX 637 (ALT 250 FOR IP): Performed by: NURSE PRACTITIONER

## 2024-08-10 PROCEDURE — 6360000002 HC RX W HCPCS

## 2024-08-10 PROCEDURE — 99231 SBSQ HOSP IP/OBS SF/LOW 25: CPT | Performed by: UROLOGY

## 2024-08-10 PROCEDURE — 51798 US URINE CAPACITY MEASURE: CPT

## 2024-08-10 PROCEDURE — 84295 ASSAY OF SERUM SODIUM: CPT

## 2024-08-10 PROCEDURE — 6370000000 HC RX 637 (ALT 250 FOR IP)

## 2024-08-10 RX ADMIN — HYDROCODONE BITARTRATE AND ACETAMINOPHEN 1 TABLET: 5; 325 TABLET ORAL at 05:02

## 2024-08-10 RX ADMIN — PANTOPRAZOLE SODIUM 40 MG: 40 TABLET, DELAYED RELEASE ORAL at 05:02

## 2024-08-10 RX ADMIN — CARVEDILOL 3.12 MG: 3.12 TABLET, FILM COATED ORAL at 20:13

## 2024-08-10 RX ADMIN — PANTOPRAZOLE SODIUM 40 MG: 40 TABLET, DELAYED RELEASE ORAL at 15:31

## 2024-08-10 RX ADMIN — BUPROPION HYDROCHLORIDE 300 MG: 300 TABLET, EXTENDED RELEASE ORAL at 09:13

## 2024-08-10 RX ADMIN — ESCITALOPRAM OXALATE 20 MG: 20 TABLET ORAL at 09:13

## 2024-08-10 RX ADMIN — HYDROCODONE BITARTRATE AND ACETAMINOPHEN 1 TABLET: 5; 325 TABLET ORAL at 12:54

## 2024-08-10 RX ADMIN — PIPERACILLIN AND TAZOBACTAM 3375 MG: 3; .375 INJECTION, POWDER, FOR SOLUTION INTRAVENOUS at 12:27

## 2024-08-10 RX ADMIN — PIPERACILLIN AND TAZOBACTAM 3375 MG: 3; .375 INJECTION, POWDER, FOR SOLUTION INTRAVENOUS at 20:24

## 2024-08-10 RX ADMIN — TRAZODONE HYDROCHLORIDE 200 MG: 100 TABLET ORAL at 20:14

## 2024-08-10 RX ADMIN — PIPERACILLIN AND TAZOBACTAM 3375 MG: 3; .375 INJECTION, POWDER, FOR SOLUTION INTRAVENOUS at 05:03

## 2024-08-10 RX ADMIN — ATORVASTATIN CALCIUM 40 MG: 40 TABLET, FILM COATED ORAL at 20:13

## 2024-08-10 RX ADMIN — LEVOTHYROXINE SODIUM 75 MCG: 0.07 TABLET ORAL at 05:06

## 2024-08-10 RX ADMIN — HYDROCODONE BITARTRATE AND ACETAMINOPHEN 1 TABLET: 5; 325 TABLET ORAL at 20:13

## 2024-08-10 RX ADMIN — SODIUM CHLORIDE, PRESERVATIVE FREE 10 ML: 5 INJECTION INTRAVENOUS at 20:14

## 2024-08-10 RX ADMIN — QUETIAPINE FUMARATE 350 MG: 400 TABLET ORAL at 20:11

## 2024-08-10 RX ADMIN — FENOFIBRATE 160 MG: 160 TABLET ORAL at 09:13

## 2024-08-10 ASSESSMENT — PAIN SCALES - GENERAL
PAINLEVEL_OUTOF10: 7
PAINLEVEL_OUTOF10: 0
PAINLEVEL_OUTOF10: 7
PAINLEVEL_OUTOF10: 2

## 2024-08-10 ASSESSMENT — PAIN SCALES - WONG BAKER
WONGBAKER_NUMERICALRESPONSE: NO HURT

## 2024-08-10 ASSESSMENT — PAIN DESCRIPTION - LOCATION
LOCATION: FLANK

## 2024-08-10 ASSESSMENT — PAIN - FUNCTIONAL ASSESSMENT: PAIN_FUNCTIONAL_ASSESSMENT: ACTIVITIES ARE NOT PREVENTED

## 2024-08-10 ASSESSMENT — PAIN DESCRIPTION - DESCRIPTORS: DESCRIPTORS: ACHING

## 2024-08-10 ASSESSMENT — PAIN DESCRIPTION - ORIENTATION
ORIENTATION: LEFT

## 2024-08-10 NOTE — PROGRESS NOTES
Troy Rinaldi, BA - CNP  Urology Progress Note    Subjective: Elli Coulter is a 69 y.o. female.    s/p * Surgery not found * on     His/Her current Diet is: ADULT DIET; Regular; 3 carb choices (45 gm/meal).    Since the previous note, the patient reports the following:  No acute issues overnight.  No fevers or chills.  No nausea or vomiting.  No chest pain or shortness of breath.  No calf pain.  Pain Controlled.  Ambulating.  Tolerating PO Diet.    Cont abx  Drain in place - draining tan colored fluid  Discharge with drain  URO recommends follow up with [facility]-discussed with patient    URO to follow peripherally, call with questions    Vitals and Labs:  Patient Vitals for the past 24 hrs:   BP Temp Temp src Pulse Resp SpO2 Weight   08/10/24 0900 (!) 120/58 97.1 °F (36.2 °C) Oral 59 16 95 % --   08/10/24 0532 -- -- -- -- 18 -- --   08/10/24 0506 (!) 149/68 97.9 °F (36.6 °C) Oral 66 18 95 % 77.8 kg (171 lb 8.3 oz)   08/09/24 2200 -- -- -- -- 18 -- --   08/09/24 2130 (!) 178/81 98.1 °F (36.7 °C) Oral 78 18 94 % --   08/09/24 1700 (!) 166/67 -- -- 68 20 94 % --   08/09/24 1304 124/68 -- -- 67 18 93 % --   08/09/24 1200 132/61 -- -- 69 18 92 % --   08/09/24 1145 125/63 -- -- -- -- 92 % --   08/09/24 1130 (!) 112/56 -- -- 69 18 92 % --   08/09/24 1105 (!) 116/54 -- -- 75 22 (!) 89 % --   08/09/24 1100 (!) 124/58 -- -- 73 20 (!) 89 % --   08/09/24 1055 (!) 128/58 -- -- 72 26 90 % --   08/09/24 1050 128/82 -- -- 76 25 95 % --     I/O last 3 completed shifts:  In: 490 [P.O.:490]  Out: -     Recent Labs     08/08/24 1740 08/09/24  0656 08/10/24  0815   WBC 11.3* 9.9 5.9   HGB 11.1* 11.0* 11.1*   HCT 34.6* 34.9* 35.8*   MCV 88.3 88.6 89.5    297 295     Recent Labs     08/08/24 1740 08/09/24  0012 08/09/24 0656 08/09/24  0757 08/09/24  2348 08/10/24  0815 08/10/24  0917   *   < > 132*   < > 129* 133* 131*   K 4.4  --  4.3  --   --   --  4.4   CL 93*  --  98  --   --   --  96*   CO2 22*  --  26  --

## 2024-08-10 NOTE — PROGRESS NOTES
Hospitalist Progress Note    Patient:  Elli Coulter      Unit/Bed:7K-09/009-A    YOB: 1955    MRN: [id number]       Acct: [id number]     PCP: Ryan Montalvo MD    Date of Admission: 8/8/2024    Assessment/Plan:    1.Recurrent left perinephric abscess   CT abd/pelvis done on 8/8/24 showed: Left perinephric abscess measures 3.2 x 2.3 x 5.4 cm is larger on  todays exam compared to 07/30/2024 and is having increased mass effect  upon the left kidney.Left psoas/lateral abdominal abscess measures 2.8 x 8.9 x 7.9 cm and is  increased in size compared to prior CT  Patient is s/p drain placement today by IR  On IV Zosyn  Blood culture in progress will continue to follow   Continue analgesics prn  Urology on consult appreciate input  Per record  patient has history of chronic nephrostomy tube but became displaced and removed on 7/27/24 and decision was made to leave it out as the abscess was too small per IR.     8/10 blood culture preliminary report no growth    2. Acute complicated cystitis with hematuria  Continue IV antibiotics  Follow cultures     8/10 urine culture reports mixed growth    3. Hypotonic hyponatremia  Current NA level 132 improving   Urine osmolality,urine sodium WNL  Continue IV fluids gentle hydration  Continue to monitor NA    8/10 NA level this am 131    4.Essential Hypertension  Stable  Continue Carvedilol with holding parameters    5.History of COPD  Stable  Continue Albuterol prn    6. NIDDM type 2  Continue insulin sliding scale  Monitor glucose  Hypoglycemia protocol  Actos on hold  Check HgA1C    7.Physical deconditioning   Supportive care   PT/OT  SS on consult  appreciate input, pt resides  in [facility]    8.GERD  Stable  Continue Protonix    9.Hypothyroidism  Stable  Continue Levothyroxine    10.Hyperlipidemia  Continue Atorvastatin    11.Bipolar 1 disorder  Continue home meds    12.Tobacco use disorder  Nicotine patch  Continue to encourage smoking  to reduce radiation to a low as reasonably achievable.          Diet: ADULT DIET; Regular; 3 carb choices (45 gm/meal)    DVT prophylaxis: [x] Lovenox                                 [] SCDs                                 [] SQ Heparin                                 [] Encourage ambulation           [] Already on Anticoagulation     Disposition:    [] Home       [] TCU       [] Rehab       [] Psych       [x] SNF       [] Long Term Care Facility       [] Other-    Code Status: Full Code    PT/OT Eval Status: yes        Electronically signed by BA Gupta CNP on 8/10/2024 at 1:08 PM

## 2024-08-10 NOTE — PLAN OF CARE
Problem: Discharge Planning  Goal: Discharge to home or other facility with appropriate resources  8/10/2024 1032 by Kathia Kim RN  Outcome: Progressing  Flowsheets (Taken 8/10/2024 0900)  Discharge to home or other facility with appropriate resources:   Identify barriers to discharge with patient and caregiver   Arrange for needed discharge resources and transportation as appropriate  8/9/2024 2240 by Laureen De La Rosa RN  Outcome: Progressing  Flowsheets (Taken 8/9/2024 2000)  Discharge to home or other facility with appropriate resources: Identify barriers to discharge with patient and caregiver     Problem: Pain  Goal: Verbalizes/displays adequate comfort level or baseline comfort level  8/10/2024 1032 by Kathia Kim RN  Outcome: Progressing  Flowsheets (Taken 8/10/2024 0506 by Laureen De La Rosa RN)  Verbalizes/displays adequate comfort level or baseline comfort level: Encourage patient to monitor pain and request assistance  8/9/2024 2240 by Laureen De La Rosa RN  Outcome: Progressing  Flowsheets (Taken 8/9/2024 2130)  Verbalizes/displays adequate comfort level or baseline comfort level: Encourage patient to monitor pain and request assistance     Problem: ABCDS Injury Assessment  Goal: Absence of physical injury  Outcome: Progressing  Flowsheets (Taken 8/10/2024 1026)  Absence of Physical Injury: Implement safety measures based on patient assessment     Problem: Safety - Adult  Goal: Free from fall injury  8/10/2024 1032 by Kathia Kim RN  Outcome: Progressing  Flowsheets (Taken 8/10/2024 1026)  Free From Fall Injury: Instruct family/caregiver on patient safety  8/9/2024 2240 by Laureen De La Rosa RN  Outcome: Progressing   Care plan reviewed with patient.  Patient verbalizes understanding of the plan of care and contribute to goal setting.

## 2024-08-10 NOTE — PLAN OF CARE
Problem: Discharge Planning  Goal: Discharge to home or other facility with appropriate resources  8/9/2024 2240 by Laureen De La Rosa RN  Outcome: Progressing  8/9/2024 1427 by Kimmy Boyd LSW  Outcome: Progressing  8/9/2024 1032 by Kathia Kim RN  Outcome: Progressing     Problem: Pain  Goal: Verbalizes/displays adequate comfort level or baseline comfort level  8/9/2024 2240 by Laureen De La Rosa RN  Outcome: Progressing  Flowsheets (Taken 8/9/2024 2130)  Verbalizes/displays adequate comfort level or baseline comfort level: Encourage patient to monitor pain and request assistance  8/9/2024 1032 by Kathia Kim RN  Outcome: Progressing  Flowsheets (Taken 8/9/2024 0830)  Verbalizes/displays adequate comfort level or baseline comfort level: Encourage patient to monitor pain and request assistance     Problem: Safety - Adult  Goal: Free from fall injury  Outcome: Progressing

## 2024-08-11 LAB
ANION GAP SERPL CALC-SCNC: 12 MEQ/L (ref 8–16)
BASOPHILS ABSOLUTE: 0 THOU/MM3 (ref 0–0.1)
BASOPHILS NFR BLD AUTO: 0.5 %
BUN SERPL-MCNC: 14 MG/DL (ref 7–22)
CALCIUM SERPL-MCNC: 9.8 MG/DL (ref 8.5–10.5)
CHLORIDE SERPL-SCNC: 101 MEQ/L (ref 98–111)
CO2 SERPL-SCNC: 24 MEQ/L (ref 23–33)
CREAT SERPL-MCNC: 0.7 MG/DL (ref 0.4–1.2)
DEPRECATED RDW RBC AUTO: 44.9 FL (ref 35–45)
EOSINOPHIL NFR BLD AUTO: 2.3 %
EOSINOPHILS ABSOLUTE: 0.1 THOU/MM3 (ref 0–0.4)
ERYTHROCYTE [DISTWIDTH] IN BLOOD BY AUTOMATED COUNT: 14 % (ref 11.5–14.5)
GFR SERPL CREATININE-BSD FRML MDRD: > 90 ML/MIN/1.73M2
GLUCOSE BLD STRIP.AUTO-MCNC: 163 MG/DL (ref 70–108)
GLUCOSE BLD STRIP.AUTO-MCNC: 177 MG/DL (ref 70–108)
GLUCOSE BLD STRIP.AUTO-MCNC: 181 MG/DL (ref 70–108)
GLUCOSE BLD STRIP.AUTO-MCNC: 198 MG/DL (ref 70–108)
GLUCOSE SERPL-MCNC: 199 MG/DL (ref 70–108)
HCT VFR BLD AUTO: 32.9 % (ref 37–47)
HGB BLD-MCNC: 10.4 GM/DL (ref 12–16)
IMM GRANULOCYTES # BLD AUTO: 0.02 THOU/MM3 (ref 0–0.07)
IMM GRANULOCYTES NFR BLD AUTO: 0.4 %
LYMPHOCYTES ABSOLUTE: 1.7 THOU/MM3 (ref 1–4.8)
LYMPHOCYTES NFR BLD AUTO: 30.3 %
MCH RBC QN AUTO: 27.9 PG (ref 26–33)
MCHC RBC AUTO-ENTMCNC: 31.6 GM/DL (ref 32.2–35.5)
MCV RBC AUTO: 88.2 FL (ref 81–99)
MONOCYTES ABSOLUTE: 0.6 THOU/MM3 (ref 0.4–1.3)
MONOCYTES NFR BLD AUTO: 10.2 %
NEUTROPHILS ABSOLUTE: 3.2 THOU/MM3 (ref 1.8–7.7)
NEUTROPHILS NFR BLD AUTO: 56.3 %
NRBC BLD AUTO-RTO: 0 /100 WBC
PLATELET # BLD AUTO: 322 THOU/MM3 (ref 130–400)
PMV BLD AUTO: 9.3 FL (ref 9.4–12.4)
POTASSIUM SERPL-SCNC: 4.2 MEQ/L (ref 3.5–5.2)
RBC # BLD AUTO: 3.73 MILL/MM3 (ref 4.2–5.4)
SODIUM SERPL-SCNC: 136 MEQ/L (ref 135–145)
SODIUM SERPL-SCNC: 136 MEQ/L (ref 135–145)
SODIUM SERPL-SCNC: 137 MEQ/L (ref 135–145)
SODIUM SERPL-SCNC: 139 MEQ/L (ref 135–145)
WBC # BLD AUTO: 5.6 THOU/MM3 (ref 4.8–10.8)

## 2024-08-11 PROCEDURE — 85025 COMPLETE CBC W/AUTO DIFF WBC: CPT

## 2024-08-11 PROCEDURE — 6370000000 HC RX 637 (ALT 250 FOR IP): Performed by: NURSE PRACTITIONER

## 2024-08-11 PROCEDURE — 99232 SBSQ HOSP IP/OBS MODERATE 35: CPT | Performed by: NURSE PRACTITIONER

## 2024-08-11 PROCEDURE — 36415 COLL VENOUS BLD VENIPUNCTURE: CPT

## 2024-08-11 PROCEDURE — 1200000000 HC SEMI PRIVATE

## 2024-08-11 PROCEDURE — 2580000003 HC RX 258

## 2024-08-11 PROCEDURE — 6370000000 HC RX 637 (ALT 250 FOR IP)

## 2024-08-11 PROCEDURE — 6360000002 HC RX W HCPCS

## 2024-08-11 PROCEDURE — 84295 ASSAY OF SERUM SODIUM: CPT

## 2024-08-11 PROCEDURE — 82948 REAGENT STRIP/BLOOD GLUCOSE: CPT

## 2024-08-11 PROCEDURE — 80048 BASIC METABOLIC PNL TOTAL CA: CPT

## 2024-08-11 PROCEDURE — 1200000003 HC TELEMETRY R&B

## 2024-08-11 RX ADMIN — LEVOTHYROXINE SODIUM 75 MCG: 0.07 TABLET ORAL at 04:33

## 2024-08-11 RX ADMIN — BUPROPION HYDROCHLORIDE 300 MG: 300 TABLET, EXTENDED RELEASE ORAL at 09:04

## 2024-08-11 RX ADMIN — FLUTICASONE PROPIONATE 1 SPRAY: 50 SPRAY, METERED NASAL at 09:05

## 2024-08-11 RX ADMIN — FERROUS SULFATE TAB 325 MG (65 MG ELEMENTAL FE) 325 MG: 325 (65 FE) TAB at 09:04

## 2024-08-11 RX ADMIN — SODIUM CHLORIDE, PRESERVATIVE FREE 10 ML: 5 INJECTION INTRAVENOUS at 09:05

## 2024-08-11 RX ADMIN — FENOFIBRATE 160 MG: 160 TABLET ORAL at 09:04

## 2024-08-11 RX ADMIN — PANTOPRAZOLE SODIUM 40 MG: 40 TABLET, DELAYED RELEASE ORAL at 16:08

## 2024-08-11 RX ADMIN — HYDROCODONE BITARTRATE AND ACETAMINOPHEN 1 TABLET: 5; 325 TABLET ORAL at 10:54

## 2024-08-11 RX ADMIN — TRAZODONE HYDROCHLORIDE 200 MG: 100 TABLET ORAL at 19:43

## 2024-08-11 RX ADMIN — PANTOPRAZOLE SODIUM 40 MG: 40 TABLET, DELAYED RELEASE ORAL at 04:33

## 2024-08-11 RX ADMIN — CARVEDILOL 3.12 MG: 3.12 TABLET, FILM COATED ORAL at 19:43

## 2024-08-11 RX ADMIN — CARVEDILOL 3.12 MG: 3.12 TABLET, FILM COATED ORAL at 09:04

## 2024-08-11 RX ADMIN — HYDROCODONE BITARTRATE AND ACETAMINOPHEN 1 TABLET: 5; 325 TABLET ORAL at 16:52

## 2024-08-11 RX ADMIN — ESCITALOPRAM OXALATE 20 MG: 20 TABLET ORAL at 09:04

## 2024-08-11 RX ADMIN — QUETIAPINE FUMARATE 350 MG: 400 TABLET ORAL at 19:42

## 2024-08-11 RX ADMIN — ATORVASTATIN CALCIUM 40 MG: 40 TABLET, FILM COATED ORAL at 19:43

## 2024-08-11 RX ADMIN — HYDROCODONE BITARTRATE AND ACETAMINOPHEN 1 TABLET: 5; 325 TABLET ORAL at 04:36

## 2024-08-11 RX ADMIN — PIPERACILLIN AND TAZOBACTAM 3375 MG: 3; .375 INJECTION, POWDER, FOR SOLUTION INTRAVENOUS at 12:02

## 2024-08-11 RX ADMIN — PIPERACILLIN AND TAZOBACTAM 3375 MG: 3; .375 INJECTION, POWDER, FOR SOLUTION INTRAVENOUS at 19:41

## 2024-08-11 RX ADMIN — SODIUM CHLORIDE, PRESERVATIVE FREE 10 ML: 5 INJECTION INTRAVENOUS at 19:43

## 2024-08-11 RX ADMIN — PIPERACILLIN AND TAZOBACTAM 3375 MG: 3; .375 INJECTION, POWDER, FOR SOLUTION INTRAVENOUS at 04:32

## 2024-08-11 RX ADMIN — ENOXAPARIN SODIUM 40 MG: 100 INJECTION SUBCUTANEOUS at 09:03

## 2024-08-11 ASSESSMENT — PAIN - FUNCTIONAL ASSESSMENT: PAIN_FUNCTIONAL_ASSESSMENT: ACTIVITIES ARE NOT PREVENTED

## 2024-08-11 ASSESSMENT — PAIN DESCRIPTION - ORIENTATION
ORIENTATION: LEFT
ORIENTATION: LEFT

## 2024-08-11 ASSESSMENT — PAIN DESCRIPTION - LOCATION
LOCATION: FLANK
LOCATION: FLANK

## 2024-08-11 ASSESSMENT — PAIN SCALES - GENERAL
PAINLEVEL_OUTOF10: 5
PAINLEVEL_OUTOF10: 3
PAINLEVEL_OUTOF10: 6
PAINLEVEL_OUTOF10: 6

## 2024-08-11 ASSESSMENT — PAIN DESCRIPTION - DESCRIPTORS
DESCRIPTORS: ACHING
DESCRIPTORS: ACHING

## 2024-08-11 NOTE — PROGRESS NOTES
This RN was told from day shift RN in shift change report that patient's accordion drain is filled with air. This RN assessed the drain and it's not maintaining a suction. Called resource nurse to evaluate.    Resource nurse came in, bag is filled with air, resource nurse exchanged the miguelina close drain and flushed it aseptically. Noted drainage coming out from patient to the drain's tubing. 30ml brownish drainage noted from the previous drain.

## 2024-08-11 NOTE — PROGRESS NOTES
Hospitalist Progress Note    Patient:  Elli Coulter      Unit/Bed:7K-09/009-A    YOB: 1955    MRN: [id number]       Acct: [id number]     PCP: Ryan Montalvo MD    Date of Admission: 8/8/2024    Assessment/Plan:    1.Recurrent left perinephric abscess   CT abd/pelvis done on 8/8/24 showed: Left perinephric abscess measures 3.2 x 2.3 x 5.4 cm is larger on  todays exam compared to 07/30/2024 and is having increased mass effect  upon the left kidney.Left psoas/lateral abdominal abscess measures 2.8 x 8.9 x 7.9 cm and is  increased in size compared to prior CT  Patient is s/p drain placement today by IR  On IV Zosyn  Blood culture in progress will continue to follow   Continue analgesics prn  Urology on consult appreciate input  Per record  patient has history of chronic nephrostomy tube but became displaced and removed on 7/27/24 and decision was made to leave it out as the abscess was too small per IR.     8/10 blood culture preliminary report no growth    8/11 left flank  fluid preliminary report growing gram negative bacilli.     2. Acute complicated cystitis with hematuria  Continue IV antibiotics  Follow cultures     8/10 urine culture reports mixed growth    3. Hypotonic hyponatremia  Current NA level 132 improving   Urine osmolality,urine sodium WNL  Continue IV fluids gentle hydration  Continue to monitor NA    8/10 NA level this am 131    8/11 NA level 139    4.Essential Hypertension  Stable  Continue Carvedilol with holding parameters    5.History of COPD  Stable  Continue Albuterol prn    6. NIDDM type 2  Continue insulin sliding scale  Monitor glucose  Hypoglycemia protocol  Actos on hold  Check HgA1C    8/11 Hg A1C 8.3    7.Physical deconditioning   Supportive care   PT/OT  SS on consult  appreciate input, pt resides  in [facility]    8.GERD  Stable  Continue Protonix    9.Hypothyroidism  Stable  Continue Levothyroxine    10.Hyperlipidemia  Continue Atorvastatin    11.Bipolar 1  fluticasone  1 spray Each Nostril BID    levothyroxine  75 mcg Oral QAM AC    pantoprazole  40 mg Oral BID AC    traZODone  200 mg Oral Nightly     PRN Meds: HYDROcodone-acetaminophen, sodium chloride flush, sodium chloride, acetaminophen **OR** acetaminophen, ketorolac, HYDROmorphone **OR** HYDROmorphone, glucose, dextrose bolus **OR** dextrose bolus, glucagon (rDNA), dextrose, albuterol      Intake/Output Summary (Last 24 hours) at 8/11/2024 1324  Last data filed at 8/11/2024 0828  Gross per 24 hour   Intake 1680 ml   Output 1080 ml   Net 600 ml       Diet:  ADULT DIET; Regular; 3 carb choices (45 gm/meal)    Exam:  BP (!) 140/70   Pulse 59   Temp 97.8 °F (36.6 °C) (Oral)   Resp 16   Ht 1.626 m (5' 4\")   Wt 77.8 kg (171 lb 8.3 oz)   SpO2 94%   BMI 29.44 kg/m²     General appearance: Awake alert forgets easily No apparent distress, appears stated age and cooperative.  HEENT: Normocephalic atraumatic Conjunctivae/corneas clear.  Neck: Supple, with full range of motion. No jugular venous distention. Trachea midline.  Respiratory:  Normal respiratory effort. Clear to auscultation, bilaterally without Rales/Wheezes/Rhonchi.  Cardiovascular: Regular rate and rhythm with normal S1/S2 without murmurs, rubs or gallops.  Abdomen: Soft, mild tenderness to drain site left flank, drain in place draining purulent drainage,non-distended with normal bowel sounds.  Musculoskeletal: passive and active ROM x 4 extremities.  Skin: Skin color, texture, turgor normal.  No rashes or lesions.  Neurologic:  Neurovascularly intact without any focal sensory/motor deficit  Psychiatric: Alert and oriented, thought content appropriate, forgets easily   Capillary Refill: Brisk,< 3 seconds   Peripheral Pulses: +2 palpable, equal bilaterally       Labs:   Recent Labs     08/09/24  0656 08/10/24  0815 08/11/24  0606   WBC 9.9 5.9 5.6   HGB 11.0* 11.1* 10.4*   HCT 34.9* 35.8* 32.9*    295 322     Recent Labs     08/09/24  0656

## 2024-08-11 NOTE — PROCEDURES
PROCEDURE NOTE  Date: 8/10/2024   Name: Elli Coulter  YOB: 1955    Procedures    Bladder scan completed in the amount of 115 ml and handed to RN

## 2024-08-11 NOTE — PLAN OF CARE
Problem: Discharge Planning  Goal: Discharge to home or other facility with appropriate resources  8/11/2024 1013 by Lisa Ludwig RN  Outcome: Progressing  Flowsheets (Taken 8/11/2024 0911)  Discharge to home or other facility with appropriate resources: Identify barriers to discharge with patient and caregiver  8/11/2024 0048 by Annalisa Nobles RN  Outcome: Progressing  Flowsheets (Taken 8/11/2024 0048)  Discharge to home or other facility with appropriate resources:   Identify barriers to discharge with patient and caregiver   Arrange for needed discharge resources and transportation as appropriate   Identify discharge learning needs (meds, wound care, etc)     Problem: Pain  Goal: Verbalizes/displays adequate comfort level or baseline comfort level  8/11/2024 1013 by Lisa Ludwig RN  Outcome: Progressing  8/11/2024 0048 by Annalisa Nobles RN  Outcome: Progressing  Flowsheets (Taken 8/11/2024 0048)  Verbalizes/displays adequate comfort level or baseline comfort level:   Encourage patient to monitor pain and request assistance   Assess pain using appropriate pain scale   Administer analgesics based on type and severity of pain and evaluate response   Implement non-pharmacological measures as appropriate and evaluate response     Problem: ABCDS Injury Assessment  Goal: Absence of physical injury  8/11/2024 1013 by Lisa Ludwig RN  Outcome: Progressing  8/11/2024 0048 by Annalisa Nobles RN  Outcome: Progressing  Flowsheets (Taken 8/11/2024 0048)  Absence of Physical Injury: Implement safety measures based on patient assessment     Problem: Safety - Adult  Goal: Free from fall injury  8/11/2024 1013 by Lisa Ludwig RN  Outcome: Progressing  8/11/2024 0048 by Annalisa Nobles RN  Outcome: Progressing  Flowsheets (Taken 8/11/2024 0048)  Free From Fall Injury: Instruct family/caregiver on patient safety     Problem: Skin/Tissue Integrity  Goal: Absence of new skin breakdown  Description: 1.  Monitor for

## 2024-08-11 NOTE — PLAN OF CARE
Problem: Discharge Planning  Goal: Discharge to home or other facility with appropriate resources  Outcome: Progressing  Flowsheets (Taken 8/11/2024 0048)  Discharge to home or other facility with appropriate resources:   Identify barriers to discharge with patient and caregiver   Arrange for needed discharge resources and transportation as appropriate   Identify discharge learning needs (meds, wound care, etc)     Problem: Pain  Goal: Verbalizes/displays adequate comfort level or baseline comfort level  Outcome: Progressing  Flowsheets (Taken 8/11/2024 0048)  Verbalizes/displays adequate comfort level or baseline comfort level:   Encourage patient to monitor pain and request assistance   Assess pain using appropriate pain scale   Administer analgesics based on type and severity of pain and evaluate response   Implement non-pharmacological measures as appropriate and evaluate response     Problem: ABCDS Injury Assessment  Goal: Absence of physical injury  Outcome: Progressing  Flowsheets (Taken 8/11/2024 0048)  Absence of Physical Injury: Implement safety measures based on patient assessment     Problem: Safety - Adult  Goal: Free from fall injury  Outcome: Progressing  Flowsheets (Taken 8/11/2024 0048)  Free From Fall Injury: Instruct family/caregiver on patient safety     Problem: Skin/Tissue Integrity  Goal: Absence of new skin breakdown  Description: 1.  Monitor for areas of redness and/or skin breakdown  2.  Assess vascular access sites hourly  3.  Every 4-6 hours minimum:  Change oxygen saturation probe site  4.  Every 4-6 hours:  If on nasal continuous positive airway pressure, respiratory therapy assess nares and determine need for appliance change or resting period.  Outcome: Progressing

## 2024-08-12 LAB
ANION GAP SERPL CALC-SCNC: 10 MEQ/L (ref 8–16)
BASOPHILS ABSOLUTE: 0 THOU/MM3 (ref 0–0.1)
BASOPHILS NFR BLD AUTO: 0.5 %
BUN SERPL-MCNC: 13 MG/DL (ref 7–22)
CALCIUM SERPL-MCNC: 10.1 MG/DL (ref 8.5–10.5)
CHLORIDE SERPL-SCNC: 102 MEQ/L (ref 98–111)
CO2 SERPL-SCNC: 26 MEQ/L (ref 23–33)
CREAT SERPL-MCNC: 0.8 MG/DL (ref 0.4–1.2)
DEPRECATED RDW RBC AUTO: 45 FL (ref 35–45)
EOSINOPHIL NFR BLD AUTO: 2.4 %
EOSINOPHILS ABSOLUTE: 0.1 THOU/MM3 (ref 0–0.4)
ERYTHROCYTE [DISTWIDTH] IN BLOOD BY AUTOMATED COUNT: 14 % (ref 11.5–14.5)
FUNGUS SPEC CULT: NORMAL
FUNGUS SPEC FUNGUS STN: NORMAL
GFR SERPL CREATININE-BSD FRML MDRD: 81 ML/MIN/1.73M2
GLUCOSE BLD STRIP.AUTO-MCNC: 138 MG/DL (ref 70–108)
GLUCOSE BLD STRIP.AUTO-MCNC: 142 MG/DL (ref 70–108)
GLUCOSE BLD STRIP.AUTO-MCNC: 191 MG/DL (ref 70–108)
GLUCOSE BLD STRIP.AUTO-MCNC: 200 MG/DL (ref 70–108)
GLUCOSE SERPL-MCNC: 147 MG/DL (ref 70–108)
HCT VFR BLD AUTO: 34.2 % (ref 37–47)
HGB BLD-MCNC: 10.7 GM/DL (ref 12–16)
IMM GRANULOCYTES # BLD AUTO: 0.04 THOU/MM3 (ref 0–0.07)
IMM GRANULOCYTES NFR BLD AUTO: 0.6 %
LYMPHOCYTES ABSOLUTE: 2.1 THOU/MM3 (ref 1–4.8)
LYMPHOCYTES NFR BLD AUTO: 33.9 %
MCH RBC QN AUTO: 27.7 PG (ref 26–33)
MCHC RBC AUTO-ENTMCNC: 31.3 GM/DL (ref 32.2–35.5)
MCV RBC AUTO: 88.6 FL (ref 81–99)
MONOCYTES ABSOLUTE: 0.6 THOU/MM3 (ref 0.4–1.3)
MONOCYTES NFR BLD AUTO: 9.6 %
NEUTROPHILS ABSOLUTE: 3.3 THOU/MM3 (ref 1.8–7.7)
NEUTROPHILS NFR BLD AUTO: 53 %
NRBC BLD AUTO-RTO: 0 /100 WBC
PLATELET # BLD AUTO: 361 THOU/MM3 (ref 130–400)
PMV BLD AUTO: 9.1 FL (ref 9.4–12.4)
POTASSIUM SERPL-SCNC: 4.2 MEQ/L (ref 3.5–5.2)
RBC # BLD AUTO: 3.86 MILL/MM3 (ref 4.2–5.4)
SODIUM SERPL-SCNC: 138 MEQ/L (ref 135–145)
WBC # BLD AUTO: 6.2 THOU/MM3 (ref 4.8–10.8)

## 2024-08-12 PROCEDURE — 97162 PT EVAL MOD COMPLEX 30 MIN: CPT

## 2024-08-12 PROCEDURE — 92523 SPEECH SOUND LANG COMPREHEN: CPT

## 2024-08-12 PROCEDURE — 6370000000 HC RX 637 (ALT 250 FOR IP)

## 2024-08-12 PROCEDURE — 1200000003 HC TELEMETRY R&B

## 2024-08-12 PROCEDURE — 6370000000 HC RX 637 (ALT 250 FOR IP): Performed by: NURSE PRACTITIONER

## 2024-08-12 PROCEDURE — 99232 SBSQ HOSP IP/OBS MODERATE 35: CPT | Performed by: NURSE PRACTITIONER

## 2024-08-12 PROCEDURE — 1200000000 HC SEMI PRIVATE

## 2024-08-12 PROCEDURE — 6360000002 HC RX W HCPCS

## 2024-08-12 PROCEDURE — 82948 REAGENT STRIP/BLOOD GLUCOSE: CPT

## 2024-08-12 PROCEDURE — 97129 THER IVNTJ 1ST 15 MIN: CPT

## 2024-08-12 PROCEDURE — 97530 THERAPEUTIC ACTIVITIES: CPT

## 2024-08-12 PROCEDURE — 2580000003 HC RX 258

## 2024-08-12 PROCEDURE — 85025 COMPLETE CBC W/AUTO DIFF WBC: CPT

## 2024-08-12 PROCEDURE — 36415 COLL VENOUS BLD VENIPUNCTURE: CPT

## 2024-08-12 PROCEDURE — 80048 BASIC METABOLIC PNL TOTAL CA: CPT

## 2024-08-12 RX ADMIN — ESCITALOPRAM OXALATE 20 MG: 20 TABLET ORAL at 09:08

## 2024-08-12 RX ADMIN — FLUTICASONE PROPIONATE 1 SPRAY: 50 SPRAY, METERED NASAL at 09:08

## 2024-08-12 RX ADMIN — TRAZODONE HYDROCHLORIDE 200 MG: 100 TABLET ORAL at 19:51

## 2024-08-12 RX ADMIN — QUETIAPINE FUMARATE 350 MG: 400 TABLET ORAL at 19:51

## 2024-08-12 RX ADMIN — HYDROMORPHONE HYDROCHLORIDE 0.5 MG: 1 INJECTION, SOLUTION INTRAMUSCULAR; INTRAVENOUS; SUBCUTANEOUS at 15:12

## 2024-08-12 RX ADMIN — PANTOPRAZOLE SODIUM 40 MG: 40 TABLET, DELAYED RELEASE ORAL at 16:54

## 2024-08-12 RX ADMIN — PIPERACILLIN AND TAZOBACTAM 3375 MG: 3; .375 INJECTION, POWDER, FOR SOLUTION INTRAVENOUS at 12:28

## 2024-08-12 RX ADMIN — CARVEDILOL 3.12 MG: 3.12 TABLET, FILM COATED ORAL at 11:11

## 2024-08-12 RX ADMIN — ENOXAPARIN SODIUM 40 MG: 100 INJECTION SUBCUTANEOUS at 09:08

## 2024-08-12 RX ADMIN — SODIUM CHLORIDE, PRESERVATIVE FREE 10 ML: 5 INJECTION INTRAVENOUS at 09:08

## 2024-08-12 RX ADMIN — CARVEDILOL 3.12 MG: 3.12 TABLET, FILM COATED ORAL at 19:51

## 2024-08-12 RX ADMIN — PIPERACILLIN AND TAZOBACTAM 3375 MG: 3; .375 INJECTION, POWDER, FOR SOLUTION INTRAVENOUS at 04:50

## 2024-08-12 RX ADMIN — FENOFIBRATE 160 MG: 160 TABLET ORAL at 09:09

## 2024-08-12 RX ADMIN — PANTOPRAZOLE SODIUM 40 MG: 40 TABLET, DELAYED RELEASE ORAL at 05:13

## 2024-08-12 RX ADMIN — PIPERACILLIN AND TAZOBACTAM 3375 MG: 3; .375 INJECTION, POWDER, FOR SOLUTION INTRAVENOUS at 19:54

## 2024-08-12 RX ADMIN — HYDROMORPHONE HYDROCHLORIDE 0.5 MG: 1 INJECTION, SOLUTION INTRAMUSCULAR; INTRAVENOUS; SUBCUTANEOUS at 09:07

## 2024-08-12 RX ADMIN — HYDROCODONE BITARTRATE AND ACETAMINOPHEN 1 TABLET: 5; 325 TABLET ORAL at 22:04

## 2024-08-12 RX ADMIN — HYDROCODONE BITARTRATE AND ACETAMINOPHEN 1 TABLET: 5; 325 TABLET ORAL at 02:15

## 2024-08-12 RX ADMIN — HYDROCODONE BITARTRATE AND ACETAMINOPHEN 1 TABLET: 5; 325 TABLET ORAL at 12:58

## 2024-08-12 RX ADMIN — ATORVASTATIN CALCIUM 40 MG: 40 TABLET, FILM COATED ORAL at 19:51

## 2024-08-12 RX ADMIN — SODIUM CHLORIDE, PRESERVATIVE FREE 10 ML: 5 INJECTION INTRAVENOUS at 19:51

## 2024-08-12 RX ADMIN — INSULIN LISPRO 1 UNITS: 100 INJECTION, SOLUTION INTRAVENOUS; SUBCUTANEOUS at 12:59

## 2024-08-12 RX ADMIN — LEVOTHYROXINE SODIUM 75 MCG: 0.07 TABLET ORAL at 05:13

## 2024-08-12 RX ADMIN — BUPROPION HYDROCHLORIDE 300 MG: 300 TABLET, EXTENDED RELEASE ORAL at 09:09

## 2024-08-12 ASSESSMENT — PAIN DESCRIPTION - LOCATION
LOCATION: ABDOMEN
LOCATION: FLANK

## 2024-08-12 ASSESSMENT — PAIN DESCRIPTION - DESCRIPTORS
DESCRIPTORS: ACHING;THROBBING
DESCRIPTORS: ACHING
DESCRIPTORS: ACHING;THROBBING
DESCRIPTORS: ACHING
DESCRIPTORS: ACHING;THROBBING
DESCRIPTORS: ACHING

## 2024-08-12 ASSESSMENT — PAIN SCALES - GENERAL
PAINLEVEL_OUTOF10: 4
PAINLEVEL_OUTOF10: 5
PAINLEVEL_OUTOF10: 8
PAINLEVEL_OUTOF10: 3
PAINLEVEL_OUTOF10: 7
PAINLEVEL_OUTOF10: 5
PAINLEVEL_OUTOF10: 4
PAINLEVEL_OUTOF10: 4
PAINLEVEL_OUTOF10: 7
PAINLEVEL_OUTOF10: 6
PAINLEVEL_OUTOF10: 4
PAINLEVEL_OUTOF10: 7

## 2024-08-12 ASSESSMENT — PAIN DESCRIPTION - FREQUENCY
FREQUENCY: CONTINUOUS

## 2024-08-12 ASSESSMENT — PAIN DESCRIPTION - ORIENTATION
ORIENTATION: LEFT

## 2024-08-12 ASSESSMENT — PAIN - FUNCTIONAL ASSESSMENT
PAIN_FUNCTIONAL_ASSESSMENT: ACTIVITIES ARE NOT PREVENTED

## 2024-08-12 ASSESSMENT — PAIN DESCRIPTION - PAIN TYPE
TYPE: CHRONIC PAIN

## 2024-08-12 NOTE — CARE COORDINATION
8/12/24, 1:20 PM EDT    DISCHARGE ON GOING EVALUATION    Elli CHEATHAM [facility] day: 4  Location: [facility]09/009-A Reason for admit: Kidney, perinephric abscess [N15.1]  Perinephric abscess [N15.1]  Psoas abscess (HCC) [K68.12]     Procedures: 8/9 CT guided drain placement by IR     Imaging since last note: na    Barriers to Discharge: needs precert for SNF, PT/OT ordered, follow truclose drain outputs. Cont IV antibiotics. Dilaudid IV.    PCP: Ryan Montalvo MD  Readmission Risk Score: 22.2    Patient Goals/Plan/Treatment Preferences: plans [facility] (from [place]). SW following.

## 2024-08-12 NOTE — PROGRESS NOTES
Hospitalist Progress Note    Patient:  Elli Coulter      Unit/Bed:7K-09/009-A    YOB: 1955    MRN: [id number]       Acct: [id number]     PCP: Ryan Montalvo MD    Date of Admission: 8/8/2024    Assessment/Plan:    1.Recurrent left perinephric abscess   CT abd/pelvis done on 8/8/24 showed: Left perinephric abscess measures 3.2 x 2.3 x 5.4 cm is larger on  todays exam compared to 07/30/2024 and is having increased mass effect  upon the left kidney.Left psoas/lateral abdominal abscess measures 2.8 x 8.9 x 7.9 cm and is  increased in size compared to prior CT  Patient is s/p drain placement today by IR  On IV Zosyn  Blood culture in progress will continue to follow   Continue analgesics prn  Urology on consult appreciate input  Per record  patient has history of chronic nephrostomy tube but became displaced and removed on 7/27/24 and decision was made to leave it out as the abscess was too small per IR.     8/10 blood culture preliminary report no growth    8/11 left flank  fluid preliminary report growing gram negative bacilli.     8/12 with left flank fluid continue Proteus mirabilis    2. Acute complicated cystitis with hematuria  Continue IV antibiotics  Follow cultures     8/10 urine culture reports mixed growth    3. Hypotonic hyponatremia  Current NA level 132 improving   Urine osmolality,urine sodium WNL  Continue IV fluids gentle hydration  Continue to monitor NA    8/10 NA level this am 131    8/11 NA level 139    8/12 sodium level this a.m. 138    4.Essential Hypertension  Stable  Continue Carvedilol with holding parameters    5.History of COPD  Stable  Continue Albuterol prn    6. NIDDM type 2  Continue insulin sliding scale  Monitor glucose  Hypoglycemia protocol  Actos on hold  Check HgA1C    8/11 Hg A1C 8.3    7.Physical deconditioning   Supportive care   PT/OT  SS on consult  appreciate input, pt resides  in [facility]    8/12 PT OT to evaluate patient need pre-CERT for SNF  placement      8.GERD  Stable  Continue Protonix    9.Hypothyroidism  Stable  Continue Levothyroxine    10.Hyperlipidemia  Continue Atorvastatin    11.Bipolar 1 disorder  Continue home meds    12.Tobacco use disorder  Nicotine patch  Continue to encourage smoking cessation        Anticipated Discharge in : 2-3 days     Active Hospital Problems    Diagnosis Date Noted    Psoas abscess (HCC) [K68.12] 08/09/2024    History of COPD [Z87.09] 08/09/2024    Perinephric abscess [N15.1] 10/29/2023           Chief Complaint: abdominal pain     Hospital Course: History of Present Illness:  Elli Coulter is a 69 y.o. female with PMHx of Left perinephric abscess, COPD, DM who presented to [facility] with chief complaint of abdominal pain. Patient reports having increased left flank pain that radiates down her right leg over the past few days. Reports some associated nausea but denies emesis. Reports some fever and chills at ECF but does not know how high her temp was. Reports several ED visits and hospitalizations for the same symptoms and issues with her nephrostomy tube in the past. Denies any other symptoms. Denies headache, dizziness, or lightheadedness. Denies chest pain or SOB. Denies abdominal pain. Denies dysuria or hematuria.          Subjective: Patient examined  was resting on the bed was awake alert, relates incisional with the pain at 7/10, RN in the room giving pain medications.  Patient denies nausea vomiting fever or chills, denies shortness of breath     Medications:  Reviewed    Infusion Medications    sodium chloride      dextrose       Scheduled Medications    QUEtiapine  350 mg Oral Nightly    sodium chloride flush  5-40 mL IntraVENous 2 times per day    enoxaparin  40 mg SubCUTAneous Daily    piperacillin-tazobactam  3,375 mg IntraVENous Q8H    lidocaine  2 patch TransDERmal Daily    insulin lispro  0-4 Units SubCUTAneous TID     insulin lispro  0-4 Units SubCUTAneous Nightly    atorvastatin  40 mg Oral

## 2024-08-12 NOTE — PLAN OF CARE
Problem: Discharge Planning  Goal: Discharge to home or other facility with appropriate resources  Outcome: Progressing  Flowsheets (Taken 8/12/2024 0341)  Discharge to home or other facility with appropriate resources:   Identify barriers to discharge with patient and caregiver   Arrange for needed discharge resources and transportation as appropriate   Identify discharge learning needs (meds, wound care, etc)     Problem: Pain  Goal: Verbalizes/displays adequate comfort level or baseline comfort level  Outcome: Progressing  Flowsheets (Taken 8/12/2024 0341)  Verbalizes/displays adequate comfort level or baseline comfort level:   Encourage patient to monitor pain and request assistance   Assess pain using appropriate pain scale   Administer analgesics based on type and severity of pain and evaluate response   Implement non-pharmacological measures as appropriate and evaluate response     Problem: ABCDS Injury Assessment  Goal: Absence of physical injury  Outcome: Progressing  Flowsheets (Taken 8/12/2024 0341)  Absence of Physical Injury: Implement safety measures based on patient assessment     Problem: Safety - Adult  Goal: Free from fall injury  Outcome: Progressing  Flowsheets (Taken 8/12/2024 0341)  Free From Fall Injury: Instruct family/caregiver on patient safety     Problem: Skin/Tissue Integrity  Goal: Absence of new skin breakdown  Description: 1.  Monitor for areas of redness and/or skin breakdown  2.  Assess vascular access sites hourly  3.  Every 4-6 hours minimum:  Change oxygen saturation probe site  4.  Every 4-6 hours:  If on nasal continuous positive airway pressure, respiratory therapy assess nares and determine need for appliance change or resting period.  Outcome: Progressing

## 2024-08-12 NOTE — PROGRESS NOTES
[facility]  SPEECH THERAPY  [facility]  Speech - Language - Cognitive Evaluation + Cognitive tx    Discharge Recommendations: SNF    SLP Individual Minutes  Time In: 1414  Time Out: 1439  Minutes: 25  Timed Code Treatment Minutes: 8 Minutes       Aynvbv-Immizjnn-Qrjpbeoma eval: 17 minutes   Cognitive tx: 8 minutes     Date: 2024  Patient Name: Elli Coulter      CSN: [id number]   : 1955  (69 y.o.)  Gender: female   Referring Physician:  Serenity Spears APRN - CNP   Diagnosis: kidndey, perinephric abscess   Precautions: fall risk   History of Present Illness/Injury: Patient admitted with above diagnosis. Per chart review, \"Elli Coulter is a 69 y.o. female with PMHx of Left perinephric abscess, COPD, DM who presented to [facility] with chief complaint of abdominal pain. Patient reports having increased left flank pain that radiates down her right leg over the past few days. Reports some associated nausea but denies emesis. Reports some fever and chills at ECF but does not know how high her temp was. Reports several ED visits and hospitalizations for the same symptoms and issues with her nephrostomy tube in the past. Denies any other symptoms. Denies headache, dizziness, or lightheadedness. Denies chest pain or SOB. Denies abdominal pain. Denies dysuria or hematuria.\"     Past Medical History:   Diagnosis Date    Allergic rhinitis     Arthritis     back, arms, hips    Bipolar 1 disorder (HCC)     Cancer (HCC)     cancerous polyps removed - Dr. Silva    Cataract     Colon polyps     COPD (chronic obstructive pulmonary disease) ([facility]) 2014    Coronary vasospasm ([facility]) 2019    Depression     Diverticulitis of colon     GERD (gastroesophageal reflux disease)     Glaucoma     Hearing loss     Hiatal hernia     History of arterial ischemic stroke 2019    History of colonoscopy 2002    History of kidney stones     Hx of blood clots 2014    PE and collar bone area

## 2024-08-12 NOTE — PROGRESS NOTES
[facility]  INPATIENT PHYSICAL THERAPY  EVALUATION  [facility] 7K - 7K-09/009-A    Discharge Recommendations: Discharge Recommendations: Continue to assess pending progress, Therapy recommended at discharge  Equipment Recommendations: Equipment Needed: No    Time In: 1322  Time Out: 1355  Timed Code Treatment Minutes: 23 Minutes  Minutes: 33          Date: 2024  Patient Name: Elli Coulter,  Gender:  female        MRN: [id number]  : 1955  (69 y.o.)      Referring Practitioner: Serenity Spears APRN - CNP  Diagnosis: Kidney, perinephric abcess  Additional Pertinent Hx: Per H&P: \"Elli Coulter is a 69 y.o. female with PMHx of Left perinephric abscess, COPD, DM who presented to [facility] with chief complaint of abdominal pain. Patient reports having increased left flank pain that radiates down her right leg over the past few days. Reports some associated nausea but denies emesis. Reports some fever and chills at ECF but does not know how high her temp was. Reports several ED visits and hospitalizations for the same symptoms and issues with her nephrostomy tube in the past. Denies any other symptoms. Denies headache, dizziness, or lightheadedness. Denies chest pain or SOB. Denies abdominal pain. Denies dysuria or hematuria.\"     Restrictions/Precautions:  Restrictions/Precautions: Fall Risk, General Precautions    Subjective:  Chart Reviewed: Yes  Patient assessed for rehabilitation services?: Yes  Family / Caregiver Present: No  Subjective: Clearance from RN to see pt this date. Pt was semi-supine in bed when PT arrived. Pt agreeable to PT session with encouragement.    General:  Overall Orientation Status: Within Normal Limits  Overall Cognitive Status: WFL  Vision: Impaired  Vision Exceptions: Wears glasses for reading  Hearing: Exceptions to WFL  Hearing Exceptions: Hard of hearing/hearing concerns       Pain: 7/10: Pt reports L flank pain    Vitals: Vitals not assessed per clinical  and CGA  Short Term Goal 5: Pt will perform bed <> chair transfers with no AD and IND  Additional Goals?: Yes  Short Term Goal 6: Pt will score mod to low fall risk on Tinetti balance assessment  Long Term Goals  Time Frame for Long Term Goals : NA due to short ELOS    Following session, patient left sitting in bedside chair. Call light and bedside table are within reach. Nursing staff aware of pt location.

## 2024-08-12 NOTE — CARE COORDINATION
8/12/24, 11:50 AM EDT    DISCHARGE PLANNING EVALUATION    Spoke with patient, she is willing to consider going to the snf at [facility] rather than return to assisted living, if recommended.   Will need PT/OT evals and recommendations if snf is needed

## 2024-08-12 NOTE — PLAN OF CARE
Problem: Discharge Planning  Goal: Discharge to home or other facility with appropriate resources  Outcome: Progressing  Flowsheets (Taken 8/12/2024 6131)  Discharge to home or other facility with appropriate resources: Identify barriers to discharge with patient and caregiver     Problem: ABCDS Injury Assessment  Goal: Absence of physical injury  Outcome: Progressing     Problem: Safety - Adult  Goal: Free from fall injury  Outcome: Progressing     Problem: Skin/Tissue Integrity  Goal: Absence of new skin breakdown  Description: 1.  Monitor for areas of redness and/or skin breakdown  2.  Assess vascular access sites hourly  3.  Every 4-6 hours minimum:  Change oxygen saturation probe site  4.  Every 4-6 hours:  If on nasal continuous positive airway pressure, respiratory therapy assess nares and determine need for appliance change or resting period.  Outcome: Progressing

## 2024-08-13 LAB
ANION GAP SERPL CALC-SCNC: 11 MEQ/L (ref 8–16)
BACTERIA SPEC AEROBE CULT: ABNORMAL
BACTERIA SPEC AEROBE CULT: ABNORMAL
BACTERIA SPEC ANAEROBE CULT: ABNORMAL
BASOPHILS ABSOLUTE: 0 THOU/MM3 (ref 0–0.1)
BASOPHILS NFR BLD AUTO: 0.6 %
BUN SERPL-MCNC: 15 MG/DL (ref 7–22)
CALCIUM SERPL-MCNC: 10 MG/DL (ref 8.5–10.5)
CHLORIDE SERPL-SCNC: 103 MEQ/L (ref 98–111)
CO2 SERPL-SCNC: 23 MEQ/L (ref 23–33)
CREAT SERPL-MCNC: 0.7 MG/DL (ref 0.4–1.2)
DEPRECATED RDW RBC AUTO: 45.2 FL (ref 35–45)
EOSINOPHIL NFR BLD AUTO: 1.9 %
EOSINOPHILS ABSOLUTE: 0.1 THOU/MM3 (ref 0–0.4)
ERYTHROCYTE [DISTWIDTH] IN BLOOD BY AUTOMATED COUNT: 13.9 % (ref 11.5–14.5)
GFR SERPL CREATININE-BSD FRML MDRD: > 90 ML/MIN/1.73M2
GLUCOSE BLD STRIP.AUTO-MCNC: 137 MG/DL (ref 70–108)
GLUCOSE BLD STRIP.AUTO-MCNC: 150 MG/DL (ref 70–108)
GLUCOSE BLD STRIP.AUTO-MCNC: 179 MG/DL (ref 70–108)
GLUCOSE BLD STRIP.AUTO-MCNC: 180 MG/DL (ref 70–108)
GLUCOSE SERPL-MCNC: 148 MG/DL (ref 70–108)
GRAM STN SPEC: ABNORMAL
HCT VFR BLD AUTO: 35.2 % (ref 37–47)
HGB BLD-MCNC: 10.9 GM/DL (ref 12–16)
IMM GRANULOCYTES # BLD AUTO: 0.05 THOU/MM3 (ref 0–0.07)
IMM GRANULOCYTES NFR BLD AUTO: 0.8 %
LYMPHOCYTES ABSOLUTE: 1.8 THOU/MM3 (ref 1–4.8)
LYMPHOCYTES NFR BLD AUTO: 28.8 %
MCH RBC QN AUTO: 27.7 PG (ref 26–33)
MCHC RBC AUTO-ENTMCNC: 31 GM/DL (ref 32.2–35.5)
MCV RBC AUTO: 89.3 FL (ref 81–99)
MONOCYTES ABSOLUTE: 0.5 THOU/MM3 (ref 0.4–1.3)
MONOCYTES NFR BLD AUTO: 8.1 %
NEUTROPHILS ABSOLUTE: 3.8 THOU/MM3 (ref 1.8–7.7)
NEUTROPHILS NFR BLD AUTO: 59.8 %
NRBC BLD AUTO-RTO: 0 /100 WBC
ORGANISM: ABNORMAL
ORGANISM: ABNORMAL
PLATELET # BLD AUTO: 331 THOU/MM3 (ref 130–400)
PMV BLD AUTO: 8.7 FL (ref 9.4–12.4)
POTASSIUM SERPL-SCNC: 4.1 MEQ/L (ref 3.5–5.2)
RBC # BLD AUTO: 3.94 MILL/MM3 (ref 4.2–5.4)
SODIUM SERPL-SCNC: 137 MEQ/L (ref 135–145)
WBC # BLD AUTO: 6.3 THOU/MM3 (ref 4.8–10.8)

## 2024-08-13 PROCEDURE — 97535 SELF CARE MNGMENT TRAINING: CPT

## 2024-08-13 PROCEDURE — 97166 OT EVAL MOD COMPLEX 45 MIN: CPT

## 2024-08-13 PROCEDURE — 6360000002 HC RX W HCPCS

## 2024-08-13 PROCEDURE — 2580000003 HC RX 258

## 2024-08-13 PROCEDURE — 85025 COMPLETE CBC W/AUTO DIFF WBC: CPT

## 2024-08-13 PROCEDURE — 36415 COLL VENOUS BLD VENIPUNCTURE: CPT

## 2024-08-13 PROCEDURE — 82948 REAGENT STRIP/BLOOD GLUCOSE: CPT

## 2024-08-13 PROCEDURE — 6370000000 HC RX 637 (ALT 250 FOR IP): Performed by: NURSE PRACTITIONER

## 2024-08-13 PROCEDURE — 1200000000 HC SEMI PRIVATE

## 2024-08-13 PROCEDURE — 99232 SBSQ HOSP IP/OBS MODERATE 35: CPT | Performed by: NURSE PRACTITIONER

## 2024-08-13 PROCEDURE — 80048 BASIC METABOLIC PNL TOTAL CA: CPT

## 2024-08-13 PROCEDURE — 6370000000 HC RX 637 (ALT 250 FOR IP)

## 2024-08-13 RX ADMIN — ENOXAPARIN SODIUM 40 MG: 100 INJECTION SUBCUTANEOUS at 08:56

## 2024-08-13 RX ADMIN — FENOFIBRATE 160 MG: 160 TABLET ORAL at 08:57

## 2024-08-13 RX ADMIN — QUETIAPINE FUMARATE 350 MG: 400 TABLET ORAL at 19:39

## 2024-08-13 RX ADMIN — TRAZODONE HYDROCHLORIDE 200 MG: 100 TABLET ORAL at 19:39

## 2024-08-13 RX ADMIN — HYDROCODONE BITARTRATE AND ACETAMINOPHEN 1 TABLET: 5; 325 TABLET ORAL at 19:53

## 2024-08-13 RX ADMIN — LEVOTHYROXINE SODIUM 75 MCG: 0.07 TABLET ORAL at 04:44

## 2024-08-13 RX ADMIN — CARVEDILOL 3.12 MG: 3.12 TABLET, FILM COATED ORAL at 19:39

## 2024-08-13 RX ADMIN — CARVEDILOL 3.12 MG: 3.12 TABLET, FILM COATED ORAL at 08:57

## 2024-08-13 RX ADMIN — FERROUS SULFATE TAB 325 MG (65 MG ELEMENTAL FE) 325 MG: 325 (65 FE) TAB at 08:57

## 2024-08-13 RX ADMIN — PANTOPRAZOLE SODIUM 40 MG: 40 TABLET, DELAYED RELEASE ORAL at 18:06

## 2024-08-13 RX ADMIN — PANTOPRAZOLE SODIUM 40 MG: 40 TABLET, DELAYED RELEASE ORAL at 04:44

## 2024-08-13 RX ADMIN — BUPROPION HYDROCHLORIDE 300 MG: 300 TABLET, EXTENDED RELEASE ORAL at 08:57

## 2024-08-13 RX ADMIN — HYDROCODONE BITARTRATE AND ACETAMINOPHEN 1 TABLET: 5; 325 TABLET ORAL at 04:51

## 2024-08-13 RX ADMIN — SODIUM CHLORIDE, PRESERVATIVE FREE 10 ML: 5 INJECTION INTRAVENOUS at 08:57

## 2024-08-13 RX ADMIN — FLUTICASONE PROPIONATE 1 SPRAY: 50 SPRAY, METERED NASAL at 19:39

## 2024-08-13 RX ADMIN — PIPERACILLIN AND TAZOBACTAM 3375 MG: 3; .375 INJECTION, POWDER, FOR SOLUTION INTRAVENOUS at 13:44

## 2024-08-13 RX ADMIN — FLUTICASONE PROPIONATE 1 SPRAY: 50 SPRAY, METERED NASAL at 08:57

## 2024-08-13 RX ADMIN — ATORVASTATIN CALCIUM 40 MG: 40 TABLET, FILM COATED ORAL at 19:39

## 2024-08-13 RX ADMIN — HYDROMORPHONE HYDROCHLORIDE 0.5 MG: 1 INJECTION, SOLUTION INTRAMUSCULAR; INTRAVENOUS; SUBCUTANEOUS at 09:02

## 2024-08-13 RX ADMIN — HYDROCODONE BITARTRATE AND ACETAMINOPHEN 1 TABLET: 5; 325 TABLET ORAL at 14:07

## 2024-08-13 RX ADMIN — PIPERACILLIN AND TAZOBACTAM 3375 MG: 3; .375 INJECTION, POWDER, FOR SOLUTION INTRAVENOUS at 19:48

## 2024-08-13 RX ADMIN — ESCITALOPRAM OXALATE 20 MG: 20 TABLET ORAL at 08:57

## 2024-08-13 RX ADMIN — PIPERACILLIN AND TAZOBACTAM 3375 MG: 3; .375 INJECTION, POWDER, FOR SOLUTION INTRAVENOUS at 04:43

## 2024-08-13 ASSESSMENT — PAIN - FUNCTIONAL ASSESSMENT: PAIN_FUNCTIONAL_ASSESSMENT: ACTIVITIES ARE NOT PREVENTED

## 2024-08-13 ASSESSMENT — PAIN DESCRIPTION - DESCRIPTORS
DESCRIPTORS: ACHING
DESCRIPTORS: ACHING

## 2024-08-13 ASSESSMENT — PAIN SCALES - GENERAL
PAINLEVEL_OUTOF10: 7
PAINLEVEL_OUTOF10: 3
PAINLEVEL_OUTOF10: 7
PAINLEVEL_OUTOF10: 5

## 2024-08-13 ASSESSMENT — PAIN DESCRIPTION - ORIENTATION
ORIENTATION: MID
ORIENTATION: LEFT

## 2024-08-13 ASSESSMENT — PAIN DESCRIPTION - LOCATION
LOCATION: FLANK
LOCATION: BACK

## 2024-08-13 NOTE — PLAN OF CARE
Problem: Discharge Planning  Goal: Discharge to home or other facility with appropriate resources  8/13/2024 1006 by Alyssa Saleh RN  Outcome: Progressing     Problem: Pain  Goal: Verbalizes/displays adequate comfort level or baseline comfort level  8/13/2024 1006 by Alyssa Saleh RN  Outcome: Progressing     Problem: ABCDS Injury Assessment  Goal: Absence of physical injury  8/13/2024 1006 by Alyssa Saleh RN  Outcome: Progressing     Problem: Safety - Adult  Goal: Free from fall injury  8/13/2024 1006 by Alyssa Saleh RN  Outcome: Progressing     Problem: Skin/Tissue Integrity  Goal: Absence of new skin breakdown  Description: 1.  Monitor for areas of redness and/or skin breakdown  2.  Assess vascular access sites hourly  3.  Every 4-6 hours minimum:  Change oxygen saturation probe site  4.  Every 4-6 hours:  If on nasal continuous positive airway pressure, respiratory therapy assess nares and determine need for appliance change or resting period.  8/13/2024 1006 by Alyssa Saleh RN  Outcome: Progressing     Problem: Chronic Conditions and Co-morbidities  Goal: Patient's chronic conditions and co-morbidity symptoms are monitored and maintained or improved  Outcome: Progressing

## 2024-08-13 NOTE — PROGRESS NOTES
[facility]  PHYSICAL THERAPY MISSED TREATMENT NOTE  [facility]    Date: 2024  Patient Name: Elli Coulter        MRN: [id number]   : 1955  (69 y.o.)  Gender: female   Referring Practitioner: Serenity Spears APRN - CNP  Diagnosis: Kidney, perinephric abcess         REASON FOR MISSED TREATMENT:  Missed Treat.  RN ok'ed session. Pt was sleeping in bed upon arrival. Pt was fairly easy to waken and politely requested to rest due to jist returning to bed and recently completing OT session. PT to attempt to see at next available date as time allows and as willing.

## 2024-08-13 NOTE — PLAN OF CARE
Problem: Discharge Planning  Goal: Discharge to home or other facility with appropriate resources  8/13/2024 0201 by Annalisa Nobles, RN  Outcome: Progressing  Flowsheets (Taken 8/13/2024 0201)  Discharge to home or other facility with appropriate resources:   Identify barriers to discharge with patient and caregiver   Arrange for needed discharge resources and transportation as appropriate   Identify discharge learning needs (meds, wound care, etc)     Problem: Pain  Goal: Verbalizes/displays adequate comfort level or baseline comfort level  Outcome: Progressing  Flowsheets (Taken 8/13/2024 0201)  Verbalizes/displays adequate comfort level or baseline comfort level:   Encourage patient to monitor pain and request assistance   Assess pain using appropriate pain scale   Administer analgesics based on type and severity of pain and evaluate response   Implement non-pharmacological measures as appropriate and evaluate response     Problem: ABCDS Injury Assessment  Goal: Absence of physical injury  8/13/2024 0201 by Annalisa Nobles, RN  Outcome: Progressing  Flowsheets (Taken 8/13/2024 0201)  Absence of Physical Injury: Implement safety measures based on patient assessment     Problem: Safety - Adult  Goal: Free from fall injury  8/13/2024 0201 by Annalisa Nobles, RN  Outcome: Progressing  Flowsheets (Taken 8/13/2024 0201)  Free From Fall Injury: Instruct family/caregiver on patient safety     Problem: Skin/Tissue Integrity  Goal: Absence of new skin breakdown  Description: 1.  Monitor for areas of redness and/or skin breakdown  2.  Assess vascular access sites hourly  3.  Every 4-6 hours minimum:  Change oxygen saturation probe site  4.  Every 4-6 hours:  If on nasal continuous positive airway pressure, respiratory therapy assess nares and determine need for appliance change or resting period.  8/13/2024 0201 by Annalisa Nobles, RN  Outcome: Progressing     Care plan reviewed with patient. Patient verbalizes understanding  of the plan of care and contributes to goal setting.

## 2024-08-13 NOTE — CARE COORDINATION
8/13/24, 9:13 AM EDT    DISCHARGE PLANNING EVALUATION    From [facility] assisted living, may consider snf at [facility], if needed, or return to assisted living.

## 2024-08-13 NOTE — PROGRESS NOTES
Hospitalist Progress Note    Patient:  Elli Coulter      Unit/Bed:7K-09/009-A    YOB: 1955    MRN: [id number]       Acct: [id number]     PCP: Ryan Montalvo MD    Date of Admission: 8/8/2024    Assessment/Plan:    1.Recurrent left perinephric abscess   CT abd/pelvis done on 8/8/24 showed: Left perinephric abscess measures 3.2 x 2.3 x 5.4 cm is larger on  todays exam compared to 07/30/2024 and is having increased mass effect  upon the left kidney.Left psoas/lateral abdominal abscess measures 2.8 x 8.9 x 7.9 cm and is  increased in size compared to prior CT  Patient is s/p drain placement today by IR  On IV Zosyn  Blood culture in progress will continue to follow   Continue analgesics prn  Urology on consult appreciate input  Per record  patient has history of chronic nephrostomy tube but became displaced and removed on 7/27/24 and decision was made to leave it out as the abscess was too small per IR.     8/10 blood culture preliminary report no growth    8/11 left flank  fluid preliminary report growing gram negative bacilli.     8/12 with left flank fluid continue Proteus mirabilis    2. Acute complicated cystitis with hematuria  Continue IV antibiotics  Follow cultures     8/10 urine culture reports mixed growth    3. Hypotonic hyponatremia  Current NA level 132 improving   Urine osmolality,urine sodium WNL  Continue IV fluids gentle hydration  Continue to monitor NA    8/10 NA level this am 131    8/11 NA level 139    8/12 sodium level this a.m. 138    8/13     4.Essential Hypertension  Stable  Continue Carvedilol with holding parameters    5.History of COPD  Stable  Continue Albuterol prn    6. NIDDM type 2  Continue insulin sliding scale  Monitor glucose  Hypoglycemia protocol  Actos on hold  Check HgA1C    8/11 Hg A1C 8.3    7.Physical deconditioning   Supportive care   PT/OT  SS on consult  appreciate input, pt resides  in [facility]    8/12 PT OT to evaluate patient need pre-CERT for

## 2024-08-13 NOTE — PROGRESS NOTES
[facility]  INPATIENT OCCUPATIONAL THERAPY  [facility] 7K  EVALUATION      Discharge Recommendations: Other (Comment) (safe return to assistive living)  Equipment Recommendations: Equipment Needed: No  Other: continue to monitor    Time In: 1144  Time Out: 1211  Timed Code Treatment Minutes: 19 Minutes  Minutes: 27          Date: 2024  Patient Name: Elli Coulter,   Gender: female      MRN: [id number]  : 1955  (69 y.o.)  Referring Practitioner: Serenity Spears APRN - CNP  Diagnosis: Kindey, perinephric absecess  Additional Pertinent Hx: Per H&P: \"Elli Coulter is a 69 y.o. female with PMHx of Left perinephric abscess, COPD, DM who presented to [facility] with chief complaint of abdominal pain. Patient reports having increased left flank pain that radiates down her right leg over the past few days. Reports some associated nausea but denies emesis. Reports some fever and chills at ECF but does not know how high her temp was. Reports several ED visits and hospitalizations for the same symptoms and issues with her nephrostomy tube in the past. Denies any other symptoms. Denies headache, dizziness, or lightheadedness. Denies chest pain or SOB. Denies abdominal pain. Denies dysuria or hematuria.\"    Restrictions/Precautions:  Restrictions/Precautions: Fall Risk, General Precautions    Subjective  Chart Reviewed: Yes, Orders, Progress Notes, History and Physical  Patient assessed for rehabilitation services?: Yes  Family / Caregiver Present: No    Subjective: checked with RN and approved session. Pt asleep in bed and easily awoken with voice, agreeable to therapy. Pt was pleasant during session, but appeared to be confused at the start of session, but became more aware by the end of session. Pt was oriented to day, place, and self.    Pain: denied/10:     Vitals: Nurse checked vitals prior to session    Social/Functional History:  Lives With: Alone  Type of Home: Assisted living  Home Layout:

## 2024-08-14 LAB
GLUCOSE BLD STRIP.AUTO-MCNC: 134 MG/DL (ref 70–108)
GLUCOSE BLD STRIP.AUTO-MCNC: 170 MG/DL (ref 70–108)
GLUCOSE BLD STRIP.AUTO-MCNC: 237 MG/DL (ref 70–108)
GLUCOSE BLD STRIP.AUTO-MCNC: 239 MG/DL (ref 70–108)

## 2024-08-14 PROCEDURE — 97535 SELF CARE MNGMENT TRAINING: CPT

## 2024-08-14 PROCEDURE — 6370000000 HC RX 637 (ALT 250 FOR IP): Performed by: NURSE PRACTITIONER

## 2024-08-14 PROCEDURE — 6370000000 HC RX 637 (ALT 250 FOR IP)

## 2024-08-14 PROCEDURE — 6360000002 HC RX W HCPCS

## 2024-08-14 PROCEDURE — 1200000000 HC SEMI PRIVATE

## 2024-08-14 PROCEDURE — 2580000003 HC RX 258

## 2024-08-14 PROCEDURE — 82948 REAGENT STRIP/BLOOD GLUCOSE: CPT

## 2024-08-14 PROCEDURE — 99232 SBSQ HOSP IP/OBS MODERATE 35: CPT

## 2024-08-14 RX ADMIN — PIPERACILLIN AND TAZOBACTAM 3375 MG: 3; .375 INJECTION, POWDER, FOR SOLUTION INTRAVENOUS at 04:38

## 2024-08-14 RX ADMIN — BUPROPION HYDROCHLORIDE 300 MG: 300 TABLET, EXTENDED RELEASE ORAL at 07:55

## 2024-08-14 RX ADMIN — PANTOPRAZOLE SODIUM 40 MG: 40 TABLET, DELAYED RELEASE ORAL at 05:33

## 2024-08-14 RX ADMIN — TRAZODONE HYDROCHLORIDE 200 MG: 100 TABLET ORAL at 20:13

## 2024-08-14 RX ADMIN — QUETIAPINE FUMARATE 350 MG: 400 TABLET ORAL at 20:11

## 2024-08-14 RX ADMIN — CARVEDILOL 3.12 MG: 3.12 TABLET, FILM COATED ORAL at 07:55

## 2024-08-14 RX ADMIN — HYDROCODONE BITARTRATE AND ACETAMINOPHEN 1 TABLET: 5; 325 TABLET ORAL at 02:28

## 2024-08-14 RX ADMIN — HYDROCODONE BITARTRATE AND ACETAMINOPHEN 1 TABLET: 5; 325 TABLET ORAL at 20:23

## 2024-08-14 RX ADMIN — ATORVASTATIN CALCIUM 40 MG: 40 TABLET, FILM COATED ORAL at 20:08

## 2024-08-14 RX ADMIN — PANTOPRAZOLE SODIUM 40 MG: 40 TABLET, DELAYED RELEASE ORAL at 16:26

## 2024-08-14 RX ADMIN — HYDROCODONE BITARTRATE AND ACETAMINOPHEN 1 TABLET: 5; 325 TABLET ORAL at 14:21

## 2024-08-14 RX ADMIN — SODIUM CHLORIDE, PRESERVATIVE FREE 10 ML: 5 INJECTION INTRAVENOUS at 07:56

## 2024-08-14 RX ADMIN — FLUTICASONE PROPIONATE 1 SPRAY: 50 SPRAY, METERED NASAL at 08:00

## 2024-08-14 RX ADMIN — PIPERACILLIN AND TAZOBACTAM 3375 MG: 3; .375 INJECTION, POWDER, FOR SOLUTION INTRAVENOUS at 12:08

## 2024-08-14 RX ADMIN — LEVOTHYROXINE SODIUM 75 MCG: 0.07 TABLET ORAL at 05:33

## 2024-08-14 RX ADMIN — INSULIN LISPRO 1 UNITS: 100 INJECTION, SOLUTION INTRAVENOUS; SUBCUTANEOUS at 12:08

## 2024-08-14 RX ADMIN — ESCITALOPRAM OXALATE 20 MG: 20 TABLET ORAL at 07:55

## 2024-08-14 RX ADMIN — PIPERACILLIN AND TAZOBACTAM 3375 MG: 3; .375 INJECTION, POWDER, FOR SOLUTION INTRAVENOUS at 20:19

## 2024-08-14 RX ADMIN — FENOFIBRATE 160 MG: 160 TABLET ORAL at 07:55

## 2024-08-14 RX ADMIN — ENOXAPARIN SODIUM 40 MG: 100 INJECTION SUBCUTANEOUS at 07:55

## 2024-08-14 RX ADMIN — HYDROCODONE BITARTRATE AND ACETAMINOPHEN 1 TABLET: 5; 325 TABLET ORAL at 08:40

## 2024-08-14 ASSESSMENT — PAIN SCALES - GENERAL
PAINLEVEL_OUTOF10: 0
PAINLEVEL_OUTOF10: 8
PAINLEVEL_OUTOF10: 3
PAINLEVEL_OUTOF10: 6
PAINLEVEL_OUTOF10: 3

## 2024-08-14 ASSESSMENT — PAIN DESCRIPTION - ORIENTATION
ORIENTATION: LEFT
ORIENTATION: LEFT;POSTERIOR

## 2024-08-14 ASSESSMENT — PAIN DESCRIPTION - LOCATION
LOCATION: BACK
LOCATION: BACK

## 2024-08-14 ASSESSMENT — PAIN - FUNCTIONAL ASSESSMENT: PAIN_FUNCTIONAL_ASSESSMENT: PREVENTS OR INTERFERES WITH MANY ACTIVE NOT PASSIVE ACTIVITIES

## 2024-08-14 ASSESSMENT — PAIN DESCRIPTION - DESCRIPTORS
DESCRIPTORS: SHARP
DESCRIPTORS: ACHING

## 2024-08-14 NOTE — PLAN OF CARE
Problem: Discharge Planning  Goal: Discharge to home or other facility with appropriate resources  Outcome: Progressing  Flowsheets (Taken 8/13/2024 1930)  Discharge to home or other facility with appropriate resources: Identify barriers to discharge with patient and caregiver     Problem: Pain  Goal: Verbalizes/displays adequate comfort level or baseline comfort level  Outcome: Progressing  Flowsheets (Taken 8/13/2024 1930)  Verbalizes/displays adequate comfort level or baseline comfort level: Assess pain using appropriate pain scale     Problem: ABCDS Injury Assessment  Goal: Absence of physical injury  Outcome: Progressing     Problem: Safety - Adult  Goal: Free from fall injury  Outcome: Progressing     Problem: Skin/Tissue Integrity  Goal: Absence of new skin breakdown  Description: 1.  Monitor for areas of redness and/or skin breakdown  2.  Assess vascular access sites hourly  3.  Every 4-6 hours minimum:  Change oxygen saturation probe site  4.  Every 4-6 hours:  If on nasal continuous positive airway pressure, respiratory therapy assess nares and determine need for appliance change or resting period.  Outcome: Progressing     Care plan reviewed with patient. Patient verbalizes understanding of the plan of care and contributes to goal setting.

## 2024-08-14 NOTE — CARE COORDINATION
8/14/24, 2:20 PM EDT    DISCHARGE PLANNING EVALUATION    Spoke with sister about return to assisted living or skilled nursing stay, she is in agreement with skilled nursing for rehab before returning to assisted living.   Spoke with Siri, facility will have bed available in snf and will start precert.

## 2024-08-14 NOTE — PROGRESS NOTES
[facility]  [facility] 7K  Occupational Therapy  Daily Note    Discharge Recommendations: Subacute/skilled nursing facility  Equipment Recommendations: Equipment Needed: No  Other: continue to monitor       Time In: 901  Time Out: 940  Timed Code Treatment Minutes: 39 Minutes  Minutes: 39          Date: 2024  Patient Name: Elli Coulter,   Gender: female      [facility]  MRN: [id number]  : 1955  (69 y.o.)  Referring Practitioner: Serenity Spears APRN - CNP  Diagnosis: Kindey, perinephric absecess  Additional Pertinent Hx: Per H&P: \"Elli Coulter is a 69 y.o. female with PMHx of Left perinephric abscess, COPD, DM who presented to [facility] with chief complaint of abdominal pain. Patient reports having increased left flank pain that radiates down her right leg over the past few days. Reports some associated nausea but denies emesis. Reports some fever and chills at ECF but does not know how high her temp was. Reports several ED visits and hospitalizations for the same symptoms and issues with her nephrostomy tube in the past. Denies any other symptoms. Denies headache, dizziness, or lightheadedness. Denies chest pain or SOB. Denies abdominal pain. Denies dysuria or hematuria.\"    Restrictions/Precautions:  Restrictions/Precautions: Fall Risk, General Precautions     Social/Functional History:  Lives With: Alone  Type of Home: Assisted living  Home Layout: One level  Home Access: Level entry  Home Equipment: Wheelchair - Manual, Walker - Rolling, Grab bars   Bathroom Shower/Tub: Walk-in shower  Bathroom Toilet: Standard  Bathroom Equipment: Grab bars in shower, Grab bars around toilet, Shower chair       ADL Assistance: Needs assistance (needs assistance to shower and toileting)  Ambulation Assistance: Independent  Transfer Assistance: Independent       Leisure & Hobbies: Play bingo  Additional Comments: used walker to ambulate, had assitance to shower and toileting  education, & procurement, Self-Care / ADL    Education:  Learners: Patient  ADL's and Fall Prevention    Goals  Short Term Goals  Time Frame for Short Term Goals: by discharge  Short Term Goal 1: Pt will complete LB bathing/dressing with supervision in prep for increased independence with dressing and bathing  Short Term Goal 2: Pt will ambulate HH distances with SBA and 0 verbal cues for safety in prep for safe functional mobility in her home environment.  Short Term Goal 3: Pt will tolerate 12-15 min standing to complete grooming tasks with 1-2 UE release and supervision in prep for increased acitivity tolerance with ADL and IADL task.  Short Term Goal 4: Pt will complete various functional transfers with supervision in prep for safe toilet transfers  Long Term Goals  Time Frame for Long Term Goals : none due to ELOS    Following session, patient left in safe position with all fall risk precautions in place.

## 2024-08-14 NOTE — PROGRESS NOTES
[facility]  PHYSICAL THERAPY MISSED TREATMENT NOTE  [facility]    Date: 2024  Patient Name: Elli Coulter        MRN: [id number]   : 1955  (69 y.o.)  Gender: female   Referring Practitioner: Serenity Spears APRN - CNP  Diagnosis: Kidney, perinephric abcess         REASON FOR MISSED TREATMENT:  Missed Treat.  RN ok'ed session however patient refused due to increased pain. Pt voices \"its everywhere in my body\". Pt encouraged to get OOB and the benefits of therapy however patient continued to refuse. PT to attempt to see at next available date as time allows and as willing.

## 2024-08-14 NOTE — PROGRESS NOTES
Hospitalist Progress Note    Patient:  Elli Coulter      Unit/Bed:7K-09/009-A    YOB: 1955    MRN: [id number]       Acct: [id number]     PCP: Ryan Montalvo MD    Date of Admission: 8/8/2024    Assessment/Plan:    Left side perinephric abscess, recurrent.  Seen by urology.  Appreciate recommendations.    -On antibiotics.  Zosyn 6/10.   -Seen by urology.  Patient will need [facility] urology follow-up.   -Fall/aspiration precautions.  Daily weights TRICIA's.  -Daily BMP.  -Pre-CERT started.   following.  To require SNF.  Primary hypertension.  On home meds.  On top of that, hydralazine as needed.  Deconditioning.  Due to multiple comorbidities.  Requires SNF placement.  PT OT.  COPD.  As needed albuterol.  Monitoring.  Type 2 diabetes mellitus.  Sliding scale insulin.  POC glucose checks AC plus at bedtime.  Hypoglycemic treatments.  Hyponatremia.  Resolved.  GERD.  On PPI  Hypothyroidism.  On Synthroid.  Bipolar disorder.  Home meds resumed.  On Lexapro/Wellbutrin, Seroquel  Tobacco smoking.  On nicotine patch.  Educated/counseled regarding quitting from smoking.  Mild anemia.  No signs of acute bleeding.  Noted.  Likely chronic disease.  Will continue monitoring.  Daily CBC.  Hyperlipidemia.  On Lipitor/trilidge.          Expected discharge date: TBD    Disposition:    [] Home       [] TCU       [] Rehab       [] Psych       [x] SNF       [] Long Term Care Facility       [] Other-    Chief Complaint: Abdominal pain    Hospital Course: Initial HPI.Elli Coulter is a 69 y.o. female with PMHx of Left perinephric abscess, COPD, DM who presented to [facility] with chief complaint of abdominal pain. Patient reports having increased left flank pain that radiates down her right leg over the past few days. Reports some associated nausea but denies emesis. Reports some fever and chills at ECF but does not know how high her temp was. Reports several ED visits and hospitalizations for the same

## 2024-08-15 LAB
BACTERIA BLD AEROBE CULT: NORMAL
BACTERIA BLD AEROBE CULT: NORMAL
GLUCOSE BLD STRIP.AUTO-MCNC: 132 MG/DL (ref 70–108)
GLUCOSE BLD STRIP.AUTO-MCNC: 177 MG/DL (ref 70–108)
GLUCOSE BLD STRIP.AUTO-MCNC: 187 MG/DL (ref 70–108)
GLUCOSE BLD STRIP.AUTO-MCNC: 192 MG/DL (ref 70–108)

## 2024-08-15 PROCEDURE — 2580000003 HC RX 258

## 2024-08-15 PROCEDURE — 6360000002 HC RX W HCPCS

## 2024-08-15 PROCEDURE — 82948 REAGENT STRIP/BLOOD GLUCOSE: CPT

## 2024-08-15 PROCEDURE — 99232 SBSQ HOSP IP/OBS MODERATE 35: CPT

## 2024-08-15 PROCEDURE — 6370000000 HC RX 637 (ALT 250 FOR IP)

## 2024-08-15 PROCEDURE — 97116 GAIT TRAINING THERAPY: CPT

## 2024-08-15 PROCEDURE — 6370000000 HC RX 637 (ALT 250 FOR IP): Performed by: NURSE PRACTITIONER

## 2024-08-15 PROCEDURE — 97110 THERAPEUTIC EXERCISES: CPT

## 2024-08-15 PROCEDURE — 1200000000 HC SEMI PRIVATE

## 2024-08-15 RX ADMIN — FERROUS SULFATE TAB 325 MG (65 MG ELEMENTAL FE) 325 MG: 325 (65 FE) TAB at 08:01

## 2024-08-15 RX ADMIN — PIPERACILLIN AND TAZOBACTAM 3375 MG: 3; .375 INJECTION, POWDER, FOR SOLUTION INTRAVENOUS at 03:51

## 2024-08-15 RX ADMIN — FLUTICASONE PROPIONATE 1 SPRAY: 50 SPRAY, METERED NASAL at 08:02

## 2024-08-15 RX ADMIN — CARVEDILOL 3.12 MG: 3.12 TABLET, FILM COATED ORAL at 21:12

## 2024-08-15 RX ADMIN — HYDROCODONE BITARTRATE AND ACETAMINOPHEN 1 TABLET: 5; 325 TABLET ORAL at 14:02

## 2024-08-15 RX ADMIN — PIPERACILLIN AND TAZOBACTAM 3375 MG: 3; .375 INJECTION, POWDER, FOR SOLUTION INTRAVENOUS at 11:10

## 2024-08-15 RX ADMIN — SODIUM CHLORIDE, PRESERVATIVE FREE 10 ML: 5 INJECTION INTRAVENOUS at 03:47

## 2024-08-15 RX ADMIN — LEVOTHYROXINE SODIUM 75 MCG: 0.07 TABLET ORAL at 06:20

## 2024-08-15 RX ADMIN — FENOFIBRATE 160 MG: 160 TABLET ORAL at 08:01

## 2024-08-15 RX ADMIN — SODIUM CHLORIDE, PRESERVATIVE FREE 5 ML: 5 INJECTION INTRAVENOUS at 21:07

## 2024-08-15 RX ADMIN — CARVEDILOL 3.12 MG: 3.12 TABLET, FILM COATED ORAL at 08:01

## 2024-08-15 RX ADMIN — ENOXAPARIN SODIUM 40 MG: 100 INJECTION SUBCUTANEOUS at 08:02

## 2024-08-15 RX ADMIN — TRAZODONE HYDROCHLORIDE 200 MG: 100 TABLET ORAL at 21:11

## 2024-08-15 RX ADMIN — PANTOPRAZOLE SODIUM 40 MG: 40 TABLET, DELAYED RELEASE ORAL at 06:20

## 2024-08-15 RX ADMIN — SODIUM CHLORIDE, PRESERVATIVE FREE 10 ML: 5 INJECTION INTRAVENOUS at 20:53

## 2024-08-15 RX ADMIN — PIPERACILLIN AND TAZOBACTAM 3375 MG: 3; .375 INJECTION, POWDER, FOR SOLUTION INTRAVENOUS at 21:10

## 2024-08-15 RX ADMIN — HYDROCODONE BITARTRATE AND ACETAMINOPHEN 1 TABLET: 5; 325 TABLET ORAL at 08:01

## 2024-08-15 RX ADMIN — SODIUM CHLORIDE: 9 INJECTION, SOLUTION INTRAVENOUS at 21:09

## 2024-08-15 RX ADMIN — SODIUM CHLORIDE: 9 INJECTION, SOLUTION INTRAVENOUS at 03:51

## 2024-08-15 RX ADMIN — ATORVASTATIN CALCIUM 40 MG: 40 TABLET, FILM COATED ORAL at 21:12

## 2024-08-15 RX ADMIN — BUPROPION HYDROCHLORIDE 300 MG: 300 TABLET, EXTENDED RELEASE ORAL at 08:01

## 2024-08-15 RX ADMIN — PANTOPRAZOLE SODIUM 40 MG: 40 TABLET, DELAYED RELEASE ORAL at 14:02

## 2024-08-15 RX ADMIN — ESCITALOPRAM OXALATE 20 MG: 20 TABLET ORAL at 08:01

## 2024-08-15 RX ADMIN — HYDROCODONE BITARTRATE AND ACETAMINOPHEN 1 TABLET: 5; 325 TABLET ORAL at 21:19

## 2024-08-15 RX ADMIN — QUETIAPINE FUMARATE 350 MG: 400 TABLET ORAL at 21:11

## 2024-08-15 ASSESSMENT — PAIN DESCRIPTION - DESCRIPTORS: DESCRIPTORS: SPASM

## 2024-08-15 ASSESSMENT — PAIN DESCRIPTION - ORIENTATION: ORIENTATION: LOWER

## 2024-08-15 ASSESSMENT — PAIN DESCRIPTION - LOCATION: LOCATION: BACK

## 2024-08-15 ASSESSMENT — PAIN SCALES - GENERAL
PAINLEVEL_OUTOF10: 6
PAINLEVEL_OUTOF10: 8
PAINLEVEL_OUTOF10: 6

## 2024-08-15 ASSESSMENT — PAIN - FUNCTIONAL ASSESSMENT: PAIN_FUNCTIONAL_ASSESSMENT: PREVENTS OR INTERFERES WITH ALL ACTIVE AND SOME PASSIVE ACTIVITIES

## 2024-08-15 NOTE — PROGRESS NOTES
[facility]  OCCUPATIONAL THERAPY MISSED TREATMENT NOTE  [facility] 7K  7K-09/009-A      Date: 8/15/2024  Patient Name: Elli Coulter        CSN: [id number]   : 1955  (69 y.o.)  Gender: female   Referring Practitioner: Serenity Spears APRN - CNP  Diagnosis: Kindey, perinephric absecess         REASON FOR MISSED TREATMENT:   OT treatment attempted Pt. Just lying down in bed with nursing assisting. Pt. Declined at this time Pt. Would like to rest.  Will re-attempt next available treatment date.

## 2024-08-15 NOTE — PROGRESS NOTES
[facility]  INPATIENT PHYSICAL THERAPY  DAILY NOTE  [facility] ORTHOPEDICS 7K - 7K-09/009-A      Discharge Recommendations: Continue to assess pending progress and Patient would benefit from continued PT at discharge  Equipment Recommendations:Equipment Needed: No      Time In: 08  Time Out: 0849  Timed Code Treatment Minutes: 23 Minutes  Minutes: 23          Date: 8/15/2024  Patient Name: Elli Coulter,  Gender:  female        MRN: [id number]  : 1955  (69 y.o.)     Referring Practitioner: Serenity Spears APRN - CNP  Diagnosis: Kidney, perinephric abcess  Additional Pertinent Hx: Per H&P: \"Elil Coulter is a 69 y.o. female with PMHx of Left perinephric abscess, COPD, DM who presented to [facility] with chief complaint of abdominal pain. Patient reports having increased left flank pain that radiates down her right leg over the past few days. Reports some associated nausea but denies emesis. Reports some fever and chills at ECF but does not know how high her temp was. Reports several ED visits and hospitalizations for the same symptoms and issues with her nephrostomy tube in the past. Denies any other symptoms. Denies headache, dizziness, or lightheadedness. Denies chest pain or SOB. Denies abdominal pain. Denies dysuria or hematuria.\"     Prior Level of Function:  Lives With: Alone  Type of Home: Assisted living  Home Layout: One level  Home Access: Level entry  Home Equipment: Wheelchair - Manual, Walker - Rolling, Grab bars   Bathroom Shower/Tub: Walk-in shower  Bathroom Toilet: Standard  Bathroom Equipment: Grab bars in shower, Grab bars around toilet, Shower chair    ADL Assistance: Needs assistance (needs assistance to shower and toileting)  Ambulation Assistance: Independent  Transfer Assistance: Independent  Additional Comments: used walker to ambulate, had assitance to shower and toileting prn    Restrictions/Precautions:  Restrictions/Precautions: Fall Risk, General Precautions

## 2024-08-15 NOTE — PLAN OF CARE
Problem: Discharge Planning  Goal: Discharge to home or other facility with appropriate resources  Outcome: Progressing  Flowsheets (Taken 8/14/2024 1955)  Discharge to home or other facility with appropriate resources: Identify barriers to discharge with patient and caregiver     Problem: Pain  Goal: Verbalizes/displays adequate comfort level or baseline comfort level  Outcome: Progressing  Flowsheets (Taken 8/14/2024 2002)  Verbalizes/displays adequate comfort level or baseline comfort level:   Encourage patient to monitor pain and request assistance   Assess pain using appropriate pain scale   Administer analgesics based on type and severity of pain and evaluate response   Implement non-pharmacological measures as appropriate and evaluate response     Problem: ABCDS Injury Assessment  Goal: Absence of physical injury  Outcome: Progressing     Problem: Safety - Adult  Goal: Free from fall injury  Outcome: Progressing     Problem: Skin/Tissue Integrity  Goal: Absence of new skin breakdown  Description: 1.  Monitor for areas of redness and/or skin breakdown  2.  Assess vascular access sites hourly  3.  Every 4-6 hours minimum:  Change oxygen saturation probe site  4.  Every 4-6 hours:  If on nasal continuous positive airway pressure, respiratory therapy assess nares and determine need for appliance change or resting period.  Outcome: Progressing     Problem: Chronic Conditions and Co-morbidities  Goal: Patient's chronic conditions and co-morbidity symptoms are monitored and maintained or improved  Outcome: Progressing  Flowsheets (Taken 8/14/2024 1955)  Care Plan - Patient's Chronic Conditions and Co-Morbidity Symptoms are Monitored and Maintained or Improved: Monitor and assess patient's chronic conditions and comorbid symptoms for stability, deterioration, or improvement

## 2024-08-15 NOTE — PROGRESS NOTES
Hospitalist Progress Note    Patient:  Elli Coulter      Unit/Bed:7K-09/009-A    YOB: 1955    MRN: [id number]       Acct: [id number]     PCP: Ryan Montalvo MD    Date of Admission: 8/8/2024    Assessment/Plan:    Left side perinephric abscess, recurrent.  Earlier on admission, patient is seen by urology, recommendations received, patient will [facility].  Patient is medically stable awaiting pre-CERT.    -On antibiotic.  on Zosyn IV 7/10.  Can be switched to p.o. Augmentin due to sensitive findings on discharge..   -Seen by urology.  Patient will need [facility] urology follow-up.   -Fall/aspiration precautions.  Daily weights TRICIA's.  -Daily BMP.  -Pre-CERT started.   following.  To require SNF.  Primary hypertension.  On home meds.  On top of that, hydralazine as needed.  Deconditioning.  Due to multiple comorbidities.  Requires SNF placement.  PT OT.  COPD.  As needed albuterol.  Monitoring.  Type 2 diabetes mellitus.  Sliding scale insulin.  POC glucose checks AC plus at bedtime.  Hypoglycemic treatments.  Hyponatremia.  Resolved.  GERD.  On PPI  Hypothyroidism.  On Synthroid.  Bipolar disorder.  Home meds resumed.  On Lexapro/Wellbutrin, Seroquel  Tobacco smoking.  On nicotine patch.  Educated/counseled regarding quitting from smoking.  Mild anemia.  No signs of acute bleeding.  Noted.  Likely chronic disease.  Will continue monitoring.  Daily CBC.  Hyperlipidemia.  On Lipitor/trilidge.          Expected discharge date: TBD    Disposition:    [] Home       [] TCU       [] Rehab       [] Psych       [x] SNF       [] Long Term Care Facility       [] Other-    Chief Complaint: Abdominal pain    Hospital Course: Initial HPI.Elli Coulter is a 69 y.o. female with PMHx of Left perinephric abscess, COPD, DM who presented to [facility] with chief complaint of abdominal pain. Patient reports having increased left flank pain that radiates down her right leg over the  Result   Status post successful abscess drainage procedure..               **This report has been created using voice recognition software.  It may contain   minor errors which are inherent in voice recognition technology.**               Electronically signed by Dr Anshul Pablo      CT ABDOMEN PELVIS W IV CONTRAST Additional Contrast? None   Final Result   Impression:   1. Left perinephric abscess measures 3.2 x 2.3 x 5.4 cm is larger on    todays exam compared to 07/30/2024 and is having increased mass effect    upon the left kidney.   2. Left psoas/lateral abdominal abscess measures 2.8 x 8.9 x 7.9 cm and is    increased in size compared to prior CT      This document has been electronically signed by: Prerna Mccormick MD on    08/08/2024 08:22 PM      All CTs at this facility use dose modulation techniques and iterative    reconstructions, and/or weight-based dosing   when appropriate to reduce radiation to a low as reasonably achievable.          DVT prophylaxis: [x] Lovenox                                 [] SCDs                                 [] SQ Heparin                                 [] Encourage ambulation           [] Already on Anticoagulation     Code Status: Full Code    PT/OT Eval Status:     Tele:   [] yes             [] no    Electronically signed by Katherine Faith DO on 8/15/2024 at 2:45 PM

## 2024-08-16 LAB
ANION GAP SERPL CALC-SCNC: 13 MEQ/L (ref 8–16)
BASOPHILS ABSOLUTE: 0 THOU/MM3 (ref 0–0.1)
BASOPHILS NFR BLD AUTO: 0.7 %
BUN SERPL-MCNC: 14 MG/DL (ref 7–22)
CALCIUM SERPL-MCNC: 10.1 MG/DL (ref 8.5–10.5)
CHLORIDE SERPL-SCNC: 104 MEQ/L (ref 98–111)
CO2 SERPL-SCNC: 23 MEQ/L (ref 23–33)
CREAT SERPL-MCNC: 0.8 MG/DL (ref 0.4–1.2)
DEPRECATED RDW RBC AUTO: 45 FL (ref 35–45)
EOSINOPHIL NFR BLD AUTO: 2.1 %
EOSINOPHILS ABSOLUTE: 0.1 THOU/MM3 (ref 0–0.4)
ERYTHROCYTE [DISTWIDTH] IN BLOOD BY AUTOMATED COUNT: 14.2 % (ref 11.5–14.5)
GFR SERPL CREATININE-BSD FRML MDRD: 81 ML/MIN/1.73M2
GLUCOSE BLD STRIP.AUTO-MCNC: 145 MG/DL (ref 70–108)
GLUCOSE BLD STRIP.AUTO-MCNC: 162 MG/DL (ref 70–108)
GLUCOSE BLD STRIP.AUTO-MCNC: 179 MG/DL (ref 70–108)
GLUCOSE BLD STRIP.AUTO-MCNC: 181 MG/DL (ref 70–108)
GLUCOSE SERPL-MCNC: 146 MG/DL (ref 70–108)
HCT VFR BLD AUTO: 35.5 % (ref 37–47)
HGB BLD-MCNC: 11.1 GM/DL (ref 12–16)
IMM GRANULOCYTES # BLD AUTO: 0.11 THOU/MM3 (ref 0–0.07)
IMM GRANULOCYTES NFR BLD AUTO: 1.9 %
LYMPHOCYTES ABSOLUTE: 2.3 THOU/MM3 (ref 1–4.8)
LYMPHOCYTES NFR BLD AUTO: 39 %
MAGNESIUM SERPL-MCNC: 1.8 MG/DL (ref 1.6–2.4)
MCH RBC QN AUTO: 27.7 PG (ref 26–33)
MCHC RBC AUTO-ENTMCNC: 31.3 GM/DL (ref 32.2–35.5)
MCV RBC AUTO: 88.5 FL (ref 81–99)
MONOCYTES ABSOLUTE: 0.4 THOU/MM3 (ref 0.4–1.3)
MONOCYTES NFR BLD AUTO: 7.1 %
NEUTROPHILS ABSOLUTE: 2.9 THOU/MM3 (ref 1.8–7.7)
NEUTROPHILS NFR BLD AUTO: 49.2 %
NRBC BLD AUTO-RTO: 0 /100 WBC
PLATELET # BLD AUTO: 359 THOU/MM3 (ref 130–400)
PMV BLD AUTO: 8.7 FL (ref 9.4–12.4)
POTASSIUM SERPL-SCNC: 4.1 MEQ/L (ref 3.5–5.2)
RBC # BLD AUTO: 4.01 MILL/MM3 (ref 4.2–5.4)
SODIUM SERPL-SCNC: 140 MEQ/L (ref 135–145)
WBC # BLD AUTO: 5.8 THOU/MM3 (ref 4.8–10.8)

## 2024-08-16 PROCEDURE — 36415 COLL VENOUS BLD VENIPUNCTURE: CPT

## 2024-08-16 PROCEDURE — 6360000002 HC RX W HCPCS

## 2024-08-16 PROCEDURE — 97535 SELF CARE MNGMENT TRAINING: CPT

## 2024-08-16 PROCEDURE — 80048 BASIC METABOLIC PNL TOTAL CA: CPT

## 2024-08-16 PROCEDURE — 6370000000 HC RX 637 (ALT 250 FOR IP)

## 2024-08-16 PROCEDURE — 99232 SBSQ HOSP IP/OBS MODERATE 35: CPT

## 2024-08-16 PROCEDURE — 82948 REAGENT STRIP/BLOOD GLUCOSE: CPT

## 2024-08-16 PROCEDURE — 85025 COMPLETE CBC W/AUTO DIFF WBC: CPT

## 2024-08-16 PROCEDURE — 1200000000 HC SEMI PRIVATE

## 2024-08-16 PROCEDURE — 6370000000 HC RX 637 (ALT 250 FOR IP): Performed by: NURSE PRACTITIONER

## 2024-08-16 PROCEDURE — 83735 ASSAY OF MAGNESIUM: CPT

## 2024-08-16 PROCEDURE — 2580000003 HC RX 258

## 2024-08-16 RX ORDER — MAGNESIUM SULFATE IN WATER 40 MG/ML
2000 INJECTION, SOLUTION INTRAVENOUS ONCE
Status: COMPLETED | OUTPATIENT
Start: 2024-08-16 | End: 2024-08-16

## 2024-08-16 RX ADMIN — FENOFIBRATE 160 MG: 160 TABLET ORAL at 11:28

## 2024-08-16 RX ADMIN — TRAZODONE HYDROCHLORIDE 200 MG: 100 TABLET ORAL at 19:46

## 2024-08-16 RX ADMIN — PANTOPRAZOLE SODIUM 40 MG: 40 TABLET, DELAYED RELEASE ORAL at 05:52

## 2024-08-16 RX ADMIN — CARVEDILOL 3.12 MG: 3.12 TABLET, FILM COATED ORAL at 11:28

## 2024-08-16 RX ADMIN — ACETAMINOPHEN 650 MG: 325 TABLET ORAL at 16:02

## 2024-08-16 RX ADMIN — HYDROCODONE BITARTRATE AND ACETAMINOPHEN 1 TABLET: 5; 325 TABLET ORAL at 11:29

## 2024-08-16 RX ADMIN — LEVOTHYROXINE SODIUM 75 MCG: 0.07 TABLET ORAL at 05:52

## 2024-08-16 RX ADMIN — BUPROPION HYDROCHLORIDE 300 MG: 300 TABLET, EXTENDED RELEASE ORAL at 11:28

## 2024-08-16 RX ADMIN — QUETIAPINE FUMARATE 350 MG: 400 TABLET ORAL at 19:45

## 2024-08-16 RX ADMIN — CARVEDILOL 3.12 MG: 3.12 TABLET, FILM COATED ORAL at 19:46

## 2024-08-16 RX ADMIN — PANTOPRAZOLE SODIUM 40 MG: 40 TABLET, DELAYED RELEASE ORAL at 16:02

## 2024-08-16 RX ADMIN — ESCITALOPRAM OXALATE 20 MG: 20 TABLET ORAL at 11:28

## 2024-08-16 RX ADMIN — SODIUM CHLORIDE, PRESERVATIVE FREE 10 ML: 5 INJECTION INTRAVENOUS at 19:47

## 2024-08-16 RX ADMIN — SODIUM CHLORIDE, PRESERVATIVE FREE 10 ML: 5 INJECTION INTRAVENOUS at 11:30

## 2024-08-16 RX ADMIN — ATORVASTATIN CALCIUM 40 MG: 40 TABLET, FILM COATED ORAL at 19:46

## 2024-08-16 RX ADMIN — MAGNESIUM SULFATE HEPTAHYDRATE 2000 MG: 40 INJECTION, SOLUTION INTRAVENOUS at 11:27

## 2024-08-16 RX ADMIN — ENOXAPARIN SODIUM 40 MG: 100 INJECTION SUBCUTANEOUS at 11:28

## 2024-08-16 ASSESSMENT — PAIN SCALES - GENERAL
PAINLEVEL_OUTOF10: 8
PAINLEVEL_OUTOF10: 5

## 2024-08-16 ASSESSMENT — PAIN SCALES - WONG BAKER
WONGBAKER_NUMERICALRESPONSE: NO HURT

## 2024-08-16 NOTE — PROGRESS NOTES
Hospitalist Progress Note    Patient:  Elli Coulter      Unit/Bed:7K-09/009-A    YOB: 1955    MRN: [id number]       Acct: [id number]     PCP: Ryan Montalvo MD    Date of Admission: 8/8/2024    Assessment/Plan:    Left side perinephric abscess, recurrent.  Earlier on admission, patient is seen by urology, recommendations received, patient will need follow-up [facility] center.  Patient is medically stable awaiting pre-CERT.    -On antibiotic.  on Zosyn IV 7/10.  Can be switched to p.o. Augmentin due to sensitive findings on discharge..   -Seen by urology.  Patient will need [facility] urology follow-up.   -Fall/aspiration precautions.  Daily weights TRICIA's.  -Daily BMP.  -Pre-CERT started.   following.  To require SNF.  Primary hypertension.  On home meds.  On top of that, hydralazine as needed.  Deconditioning.  Due to multiple comorbidities.  Requires SNF placement.  PT OT.  COPD.  As needed albuterol.  Monitoring.  Type 2 diabetes mellitus.  Sliding scale insulin.  POC glucose checks AC plus at bedtime.  Hypoglycemic treatments.  Hyponatremia.  Resolved.  GERD.  On PPI  Hypothyroidism.  On Synthroid.  Bipolar disorder.  Home meds resumed.  On Lexapro/Wellbutrin, Seroquel  Tobacco smoking.  On nicotine patch.  Educated/counseled regarding quitting from smoking.  Mild anemia.  No signs of acute bleeding.  Noted.  Likely chronic disease.  Will continue monitoring.  Daily CBC.  Hyperlipidemia.  On Lipitor/trilidge.          Expected discharge date: TBD    Disposition:    [] Home       [] TCU       [] Rehab       [] Psych       [x] SNF       [] Long Term Care Facility       [] Other-    Chief Complaint: Abdominal pain    Hospital Course: Initial HPI.Elli Coulter is a 69 y.o. female with PMHx of Left perinephric abscess, COPD, DM who presented to [facility] with chief complaint of abdominal pain. Patient reports having increased left flank pain that radiates down her right leg over the  Result   Status post successful abscess drainage procedure..               **This report has been created using voice recognition software.  It may contain   minor errors which are inherent in voice recognition technology.**               Electronically signed by Dr Anshul Pablo      CT ABDOMEN PELVIS W IV CONTRAST Additional Contrast? None   Final Result   Impression:   1. Left perinephric abscess measures 3.2 x 2.3 x 5.4 cm is larger on    todays exam compared to 07/30/2024 and is having increased mass effect    upon the left kidney.   2. Left psoas/lateral abdominal abscess measures 2.8 x 8.9 x 7.9 cm and is    increased in size compared to prior CT      This document has been electronically signed by: Prerna Mccormick MD on    08/08/2024 08:22 PM      All CTs at this facility use dose modulation techniques and iterative    reconstructions, and/or weight-based dosing   when appropriate to reduce radiation to a low as reasonably achievable.          DVT prophylaxis: [x] Lovenox                                 [] SCDs                                 [] SQ Heparin                                 [] Encourage ambulation           [] Already on Anticoagulation     Code Status: Full Code    PT/OT Eval Status:     Tele:   [] yes             [] no    Electronically signed by Katherine Faith DO on 8/16/2024 at 9:49 AM

## 2024-08-16 NOTE — DISCHARGE INSTR - COC
Continuity of Care Form    Patient Name: Elli Coulter   :  1955  MRN:  [id number]    Admit date:  2024  Discharge date:  2024    Code Status Order: Full Code   Advance Directives:   Advance Care Flowsheet Documentation             Admitting Physician:  Bette Ohara APRN - CNP  PCP: Ryan Montalvo MD    Discharging Nurse: Roney  Discharging Hospital Unit/Room#: 7K-09/009-A  Discharging Unit Phone Number: [phone]    Emergency Contact:   Extended Emergency Contact Information  Primary Emergency Contact: SREEKANTH BAEZ  Address: [address] [place] [address]  Home Phone: [phone]  Relation: Niece/Nephew  Secondary Emergency Contact: NaiBailey   [facility]  Home Phone: [phone]  Mobile Phone: [phone]  Relation: Brother/Sister  Hearing or visual needs: None  Other needs: None  Preferred language: English   needed? No    Past Surgical History:  Past Surgical History:   Procedure Laterality Date    ABDOMEN SURGERY      x4 removed cysts and ovary removed    CARDIAC SURGERY      heart caths, no stents    CARPAL TUNNEL RELEASE Bilateral 2016    CHOLECYSTECTOMY, LAPAROSCOPIC  6/12/15    Dr. Huff    COLONOSCOPY      CT DRAIN SOFT TISSUE ABSCESS  2024    CT DRAIN SOFT TISSUE ABSCESS 2024 [facility] CT SCAN    CYSTOSCOPY N/A 2/10/2022    CYSTOSCOPY URETHRAL IMPLANT INJECTION performed by Sami Lanier MD at [facility] OR    CYSTOSCOPY N/A 2022    Cystoscopy Bladder Botox 200 units Left Stent Placement performed by Sami Lanier MD at [facility] OR    CYSTOSCOPY N/A 2023    CYSTOSCOPY WITH BLADDER BOTOX 200 UNITS performed by Sami Lanier MD at [facility] OR    CYSTOSCOPY W BIOPSY OF BLADDER N/A 2020    CYSTOSCOPY WITH BLADDER BIOPSIES performed by Chris Avila MD at [facility] OR    DILATATION, ESOPHAGUS      EGD      ELBOW SURGERY Right 10/7/2004    Dr. Cronin    ELBOW SURGERY Bilateral 2016

## 2024-08-16 NOTE — CARE COORDINATION
8/16/24, 2:58 PM EDT    DISCHARGE ON GOING EVALUATION    Elli [facility] day: 8  Location: -09/009-A Reason for admit: Kidney, perinephric abscess [N15.1]  Perinephric abscess [N15.1]  Psoas abscess (HCC) [K68.12]     Procedures: 8/9 CT guided drain placement by IR     Imaging since last note: na    Barriers to Discharge: await precert; needs to continue to work with both PT/OT. Norco for pain, DM mgmt.    PCP: Ryan Montalvo MD  Readmission Risk Score: 22.3    Patient Goals/Plan/Treatment Preferences: precert for [facility].

## 2024-08-16 NOTE — PROGRESS NOTES
Low Risk Nutrition Note      Recommendations/Plan:  Initiate Activia (TID) - to assist with constipation - would recommend increase in bowel regimen to assist with BM motivation as last documented was 8/10. Continue diet per provider.     Nutrition Assessment:   Pt falling asleep throughout visit - RD asked multiple questions 3x each before response was given by pt. Pt states that she usually suffers with constipation and will occasionally take miralax at home to assist with. Will add activia yogurt to assist with BM motivation this admit. Otherwise, pt eating % of meals throughout LOS, wt stable per EMR and pt denies questions.    Reason for Visit:  Initial, RD Nutrition Re-Screen/LOS    Current Nutrition Therapies:  ADULT DIET; Regular; 3 carb choices (45 gm/meal)  ADULT ORAL NUTRITION SUPPLEMENT; Breakfast, Lunch, Dinner; Other Oral Supplement; Activia yogurt    Height:  162.6 cm (5' 4\")    Current Body Weight:  77.7 kg (171 lb 4.8 oz) (8/16: no edema)       Diagnosis:  No nutrition diagnosis at this time.     Monitoring and Evaluation:  Patient will be monitored per nutrition standards of care.     Consult Dietitian if nutrition intervention essential to patient care is needed.     Discharge Planning:  No needs      Electronically signed by Scottie Beltrán, MICK, LD on 8/16/24 at 11:05 AM EDT    Contact:  [phone]

## 2024-08-16 NOTE — PLAN OF CARE
Problem: Discharge Planning  Goal: Discharge to home or other facility with appropriate resources  8/16/2024 1901 by Kathia Kim RN  Outcome: Progressing  8/16/2024 1900 by Kathia Kim RN  Flowsheets (Taken 8/16/2024 1000)  Discharge to home or other facility with appropriate resources:   Identify barriers to discharge with patient and caregiver   Arrange for needed discharge resources and transportation as appropriate     Problem: Pain  Goal: Verbalizes/displays adequate comfort level or baseline comfort level  8/16/2024 1901 by Kathia Kim RN  Outcome: Progressing  8/16/2024 1900 by Kathia Kim RN  Flowsheets (Taken 8/15/2024 2215 by Joyce Tolbert RN)  Verbalizes/displays adequate comfort level or baseline comfort level:   Encourage patient to monitor pain and request assistance   Assess pain using appropriate pain scale   Administer analgesics based on type and severity of pain and evaluate response   Implement non-pharmacological measures as appropriate and evaluate response     Problem: ABCDS Injury Assessment  Goal: Absence of physical injury  8/16/2024 1901 by Kathia Kim RN  Outcome: Progressing  8/16/2024 1900 by Kathia Kim RN  Flowsheets (Taken 8/16/2024 1000)  Absence of Physical Injury: Implement safety measures based on patient assessment     Problem: Safety - Adult  Goal: Free from fall injury  8/16/2024 1901 by Kathia Kim RN  Outcome: Progressing  8/16/2024 1900 by Kathia Kim RN  Flowsheets (Taken 8/16/2024 1000)  Free From Fall Injury: Instruct family/caregiver on patient safety     Problem: Skin/Tissue Integrity  Goal: Absence of new skin breakdown  Description: 1.  Monitor for areas of redness and/or skin breakdown  2.  Assess vascular access sites hourly  3.  Every 4-6 hours minimum:  Change oxygen saturation probe site  4.  Every 4-6 hours:  If on nasal continuous positive airway pressure, respiratory therapy assess nares and determine need for appliance change or resting

## 2024-08-16 NOTE — PROGRESS NOTES
[facility]  [facility]  Occupational Therapy  Daily Note    Discharge Recommendations: Subacute/skilled nursing facility  Equipment Recommendations: Equipment Needed: No  Other: continue to monitor       Time In: 1630  Time Out: 1655  Timed Code Treatment Minutes: 25 Minutes  Minutes: 25          Date: 2024  Patient Name: Elli Coulter,   Gender: female      Room: [address] [place] [address]  MRN: [id number]  : 1955  (69 y.o.)  Referring Practitioner: Serenity Spears APRN - CNP  Diagnosis: Kindey, perinephric absecess  Additional Pertinent Hx: Per H&P: \"Elli Coulter is a 69 y.o. female with PMHx of Left perinephric abscess, COPD, DM who presented to [facility] with chief complaint of abdominal pain. Patient reports having increased left flank pain that radiates down her right leg over the past few days. Reports some associated nausea but denies emesis. Reports some fever and chills at ECF but does not know how high her temp was. Reports several ED visits and hospitalizations for the same symptoms and issues with her nephrostomy tube in the past. Denies any other symptoms. Denies headache, dizziness, or lightheadedness. Denies chest pain or SOB. Denies abdominal pain. Denies dysuria or hematuria.\"    Restrictions/Precautions:  Restrictions/Precautions: Fall Risk, General Precautions; L nephrostomy tube    Social/Functional History:  Lives With: Alone  Type of Home: Assisted living  Home Layout: One level  Home Access: Level entry  Home Equipment: Wheelchair - Manual, Walker - Rolling, Grab bars   Bathroom Shower/Tub: Walk-in shower  Bathroom Toilet: Standard  Bathroom Equipment: Grab bars in shower, Grab bars around toilet, Shower chair       ADL Assistance: Needs assistance (needs assistance to shower and toileting)  Ambulation Assistance: Independent  Transfer Assistance: Independent       Leisure & Hobbies: Play bingo  Additional Comments: used walker to ambulate, had assitance to shower and  strength.  Discussed pt's pain management.  Encouraged pt to find the times when she can tolerate doing activities and that it is beneficial to be doing them at that time.  Pt verbalized understanding.   AM-PAC Inpatient Daily Activity Raw Score: 23  AM-PAC Inpatient ADL T-Scale Score : 51.12  ADL Inpatient CMS 0-100% Score: 15.86    Modified [place] Scale:  Not Applicable    ASSESSMENT:     Activity Tolerance:  Patient tolerance of  treatment: Limited by pain Pt stood for 1.5 minute duration while finishing her self care. She remained in the chair following the session.      Plan: Times Per Week: 4-5x  Current Treatment Recommendations: Strengthening, Functional mobility training, Endurance training, Safety education & training, Patient/Caregiver education & training, Equipment evaluation, education, & procurement, Self-Care / ADL    Education:  Learners: Patient  Role of OT, Plan of Care, and Importance of Increasing Activity    Goals  Short Term Goals  Time Frame for Short Term Goals: by discharge  Short Term Goal 1: Pt will complete LB bathing/dressing with supervision in prep for increased independence with dressing and bathing  Short Term Goal 2: Pt will ambulate HH distances with SBA and 0 verbal cues for safety in prep for safe functional mobility in her home environment.  Short Term Goal 3: Pt will tolerate 12-15 min standing to complete grooming tasks with 1-2 UE release and supervision in prep for increased acitivity tolerance with ADL and IADL task.  Short Term Goal 4: Pt will complete various functional transfers with supervision in prep for safe toilet transfers  Long Term Goals  Time Frame for Long Term Goals : none due to ELOS    Following session, patient left in safe position with all fall risk precautions in place.

## 2024-08-16 NOTE — PROGRESS NOTES
[facility]  PHYSICAL THERAPY MISSED TREATMENT NOTE  [facility]    Date: 2024  Patient Name: Elli Coulter        MRN: [id number]   : 1955  (69 y.o.)  Gender: female   Referring Practitioner: Serenity Spears APRN - CNP  Diagnosis: Kidney, perinephric abcess         REASON FOR MISSED TREATMENT:  Missed Treat. RN ok'ed session.On first attempt patient was sleeping in bed and was unable to wake to a therapeutic level with patient keeping eyes closed. On second attempt patient was sitting up in chair and denies wanting to complete exercises or ambulaton. Education provided on benefits of therapy however patient continued to refuse. PT to attempt to see at next available date as time allows and as willing.

## 2024-08-17 LAB
ANION GAP SERPL CALC-SCNC: 11 MEQ/L (ref 8–16)
BUN SERPL-MCNC: 16 MG/DL (ref 7–22)
CALCIUM SERPL-MCNC: 9.8 MG/DL (ref 8.5–10.5)
CHLORIDE SERPL-SCNC: 101 MEQ/L (ref 98–111)
CO2 SERPL-SCNC: 24 MEQ/L (ref 23–33)
CREAT SERPL-MCNC: 0.8 MG/DL (ref 0.4–1.2)
GFR SERPL CREATININE-BSD FRML MDRD: 81 ML/MIN/1.73M2
GLUCOSE BLD STRIP.AUTO-MCNC: 172 MG/DL (ref 70–108)
GLUCOSE BLD STRIP.AUTO-MCNC: 190 MG/DL (ref 70–108)
GLUCOSE BLD STRIP.AUTO-MCNC: 190 MG/DL (ref 70–108)
GLUCOSE BLD STRIP.AUTO-MCNC: 214 MG/DL (ref 70–108)
GLUCOSE BLD STRIP.AUTO-MCNC: 220 MG/DL (ref 70–108)
GLUCOSE SERPL-MCNC: 168 MG/DL (ref 70–108)
POTASSIUM SERPL-SCNC: 4.5 MEQ/L (ref 3.5–5.2)
SODIUM SERPL-SCNC: 136 MEQ/L (ref 135–145)

## 2024-08-17 PROCEDURE — 6370000000 HC RX 637 (ALT 250 FOR IP): Performed by: NURSE PRACTITIONER

## 2024-08-17 PROCEDURE — 2580000003 HC RX 258

## 2024-08-17 PROCEDURE — 6370000000 HC RX 637 (ALT 250 FOR IP)

## 2024-08-17 PROCEDURE — 99231 SBSQ HOSP IP/OBS SF/LOW 25: CPT

## 2024-08-17 PROCEDURE — 82948 REAGENT STRIP/BLOOD GLUCOSE: CPT

## 2024-08-17 PROCEDURE — 1200000000 HC SEMI PRIVATE

## 2024-08-17 PROCEDURE — 36415 COLL VENOUS BLD VENIPUNCTURE: CPT

## 2024-08-17 PROCEDURE — 80048 BASIC METABOLIC PNL TOTAL CA: CPT

## 2024-08-17 RX ADMIN — PANTOPRAZOLE SODIUM 40 MG: 40 TABLET, DELAYED RELEASE ORAL at 17:10

## 2024-08-17 RX ADMIN — BUPROPION HYDROCHLORIDE 300 MG: 300 TABLET, EXTENDED RELEASE ORAL at 09:06

## 2024-08-17 RX ADMIN — FERROUS SULFATE TAB 325 MG (65 MG ELEMENTAL FE) 325 MG: 325 (65 FE) TAB at 09:05

## 2024-08-17 RX ADMIN — QUETIAPINE FUMARATE 350 MG: 400 TABLET ORAL at 19:59

## 2024-08-17 RX ADMIN — CARVEDILOL 3.12 MG: 3.12 TABLET, FILM COATED ORAL at 19:59

## 2024-08-17 RX ADMIN — HYDROCODONE BITARTRATE AND ACETAMINOPHEN 1 TABLET: 5; 325 TABLET ORAL at 14:10

## 2024-08-17 RX ADMIN — PANTOPRAZOLE SODIUM 40 MG: 40 TABLET, DELAYED RELEASE ORAL at 05:37

## 2024-08-17 RX ADMIN — HYDROCODONE BITARTRATE AND ACETAMINOPHEN 1 TABLET: 5; 325 TABLET ORAL at 05:37

## 2024-08-17 RX ADMIN — CARVEDILOL 3.12 MG: 3.12 TABLET, FILM COATED ORAL at 09:06

## 2024-08-17 RX ADMIN — ATORVASTATIN CALCIUM 40 MG: 40 TABLET, FILM COATED ORAL at 19:59

## 2024-08-17 RX ADMIN — FENOFIBRATE 160 MG: 160 TABLET ORAL at 09:05

## 2024-08-17 RX ADMIN — SODIUM CHLORIDE, PRESERVATIVE FREE 10 ML: 5 INJECTION INTRAVENOUS at 14:10

## 2024-08-17 RX ADMIN — TRAZODONE HYDROCHLORIDE 200 MG: 100 TABLET ORAL at 19:59

## 2024-08-17 RX ADMIN — ACETAMINOPHEN 650 MG: 325 TABLET ORAL at 09:05

## 2024-08-17 RX ADMIN — ESCITALOPRAM OXALATE 20 MG: 20 TABLET ORAL at 09:05

## 2024-08-17 RX ADMIN — LEVOTHYROXINE SODIUM 75 MCG: 0.07 TABLET ORAL at 05:37

## 2024-08-17 RX ADMIN — SODIUM CHLORIDE, PRESERVATIVE FREE 10 ML: 5 INJECTION INTRAVENOUS at 19:59

## 2024-08-17 ASSESSMENT — PAIN SCALES - GENERAL
PAINLEVEL_OUTOF10: 4
PAINLEVEL_OUTOF10: 7
PAINLEVEL_OUTOF10: 7
PAINLEVEL_OUTOF10: 5
PAINLEVEL_OUTOF10: 4

## 2024-08-17 ASSESSMENT — PAIN DESCRIPTION - DESCRIPTORS
DESCRIPTORS: ACHING
DESCRIPTORS: ACHING

## 2024-08-17 ASSESSMENT — PAIN DESCRIPTION - LOCATION
LOCATION: FLANK
LOCATION: FLANK

## 2024-08-17 ASSESSMENT — PAIN DESCRIPTION - ORIENTATION
ORIENTATION: LEFT
ORIENTATION: LEFT

## 2024-08-17 ASSESSMENT — PAIN SCALES - WONG BAKER
WONGBAKER_NUMERICALRESPONSE: NO HURT

## 2024-08-17 ASSESSMENT — PAIN DESCRIPTION - FREQUENCY: FREQUENCY: CONTINUOUS

## 2024-08-17 ASSESSMENT — PAIN DESCRIPTION - PAIN TYPE: TYPE: CHRONIC PAIN

## 2024-08-17 ASSESSMENT — PAIN DESCRIPTION - ONSET: ONSET: ON-GOING

## 2024-08-17 NOTE — PROGRESS NOTES
Hospitalist Progress Note    Patient:  Elli Coulter      Unit/Bed:7K-09/009-A    YOB: 1955    MRN: [id number]       Acct: [id number]     PCP: Ryan Montalvo MD    Date of Admission: 8/8/2024    Assessment/Plan:    Left side perinephric abscess, recurrent.  Earlier on admission, patient is seen by urology, recommendations received, patient will need follow-up [facility] center.  Patient is medically stable awaiting pre-CERT.    -On antibiotic.  on Zosyn IV 7/10.  Can be switched to p.o. Augmentin due to sensitive findings on discharge..   -Seen by urology.  Patient will need [facility] urology follow-up.   -Fall/aspiration precautions.  Daily weights TRICIA's.  -Daily BMP.  -Pre-CERT started.   following.  To require SNF.  Primary hypertension.  On home meds.  On top of that, hydralazine as needed.  Deconditioning.  Due to multiple comorbidities.  Requires SNF placement.  PT OT.  COPD.  As needed albuterol.  Monitoring.  Type 2 diabetes mellitus.  Sliding scale insulin.  POC glucose checks AC plus at bedtime.  Hypoglycemic treatments.  Hyponatremia.  Resolved.  GERD.  On PPI  Hypothyroidism.  On Synthroid.  Bipolar disorder.  Home meds resumed.  On Lexapro/Wellbutrin, Seroquel  Tobacco smoking.  On nicotine patch.  Educated/counseled regarding quitting from smoking.  Mild anemia.  No signs of acute bleeding.  Noted.  Likely chronic disease.  Will continue monitoring.  Daily CBC.  Hyperlipidemia.  On Lipitor/trilidge.          Expected discharge date: TBD    Disposition:    [] Home       [] TCU       [] Rehab       [] Psych       [x] SNF       [] Long Term Care Facility       [] Other-    Chief Complaint: Abdominal pain    Hospital Course: Initial HPI.Elli Coulter is a 69 y.o. female with PMHx of Left perinephric abscess, COPD, DM who presented to [facility] with chief complaint of abdominal pain. Patient reports having increased left flank pain that radiates down her right leg over the        Radiology:  CT DRAINAGE HEMATOMA/SEROMA/FLUID COLLECTION   Final Result   Status post successful abscess drainage procedure..               **This report has been created using voice recognition software.  It may contain   minor errors which are inherent in voice recognition technology.**               Electronically signed by Dr Anshul Pablo      CT ABDOMEN PELVIS W IV CONTRAST Additional Contrast? None   Final Result   Impression:   1. Left perinephric abscess measures 3.2 x 2.3 x 5.4 cm is larger on    todays exam compared to 07/30/2024 and is having increased mass effect    upon the left kidney.   2. Left psoas/lateral abdominal abscess measures 2.8 x 8.9 x 7.9 cm and is    increased in size compared to prior CT      This document has been electronically signed by: Prerna Mccormick MD on    08/08/2024 08:22 PM      All CTs at this facility use dose modulation techniques and iterative    reconstructions, and/or weight-based dosing   when appropriate to reduce radiation to a low as reasonably achievable.          DVT prophylaxis: [x] Lovenox                                 [] SCDs                                 [] SQ Heparin                                 [] Encourage ambulation           [] Already on Anticoagulation     Code Status: Full Code    PT/OT Eval Status:     Tele:   [] yes             [] no    Electronically signed by Katherine Faith DO on 8/17/2024 at 5:47 PM

## 2024-08-17 NOTE — PLAN OF CARE
Problem: Discharge Planning  Goal: Discharge to home or other facility with appropriate resources  8/17/2024 0054 by Annalisa Nobles RN  Outcome: Progressing  Flowsheets (Taken 8/17/2024 0054)  Discharge to home or other facility with appropriate resources:   Identify barriers to discharge with patient and caregiver   Arrange for needed discharge resources and transportation as appropriate   Identify discharge learning needs (meds, wound care, etc)     Problem: Pain  Goal: Verbalizes/displays adequate comfort level or baseline comfort level  8/17/2024 0054 by Annalisa Nobles RN  Outcome: Progressing  Flowsheets (Taken 8/17/2024 0054)  Verbalizes/displays adequate comfort level or baseline comfort level:   Encourage patient to monitor pain and request assistance   Assess pain using appropriate pain scale   Administer analgesics based on type and severity of pain and evaluate response   Implement non-pharmacological measures as appropriate and evaluate response     Problem: ABCDS Injury Assessment  Goal: Absence of physical injury  8/17/2024 0054 by Annalisa Nobles RN  Outcome: Progressing  Flowsheets (Taken 8/17/2024 0054)  Absence of Physical Injury: Implement safety measures based on patient assessment     Problem: Safety - Adult  Goal: Free from fall injury  8/17/2024 0054 by Annalisa Nobles RN  Outcome: Progressing  Flowsheets (Taken 8/17/2024 0054)  Free From Fall Injury: Instruct family/caregiver on patient safety     Problem: Skin/Tissue Integrity  Goal: Absence of new skin breakdown  Description: 1.  Monitor for areas of redness and/or skin breakdown  2.  Assess vascular access sites hourly  3.  Every 4-6 hours minimum:  Change oxygen saturation probe site  4.  Every 4-6 hours:  If on nasal continuous positive airway pressure, respiratory therapy assess nares and determine need for appliance change or resting period.  8/17/2024 0054 by Annalisa Nobles, RN  Outcome: Progressing     Problem: Chronic Conditions

## 2024-08-18 LAB
ANION GAP SERPL CALC-SCNC: 11 MEQ/L (ref 8–16)
BUN SERPL-MCNC: 18 MG/DL (ref 7–22)
CALCIUM SERPL-MCNC: 10.4 MG/DL (ref 8.5–10.5)
CHLORIDE SERPL-SCNC: 104 MEQ/L (ref 98–111)
CO2 SERPL-SCNC: 21 MEQ/L (ref 23–33)
CREAT SERPL-MCNC: 0.7 MG/DL (ref 0.4–1.2)
GFR SERPL CREATININE-BSD FRML MDRD: > 90 ML/MIN/1.73M2
GLUCOSE BLD STRIP.AUTO-MCNC: 168 MG/DL (ref 70–108)
GLUCOSE BLD STRIP.AUTO-MCNC: 174 MG/DL (ref 70–108)
GLUCOSE BLD STRIP.AUTO-MCNC: 218 MG/DL (ref 70–108)
GLUCOSE BLD STRIP.AUTO-MCNC: 235 MG/DL (ref 70–108)
GLUCOSE SERPL-MCNC: 168 MG/DL (ref 70–108)
POTASSIUM SERPL-SCNC: 4.5 MEQ/L (ref 3.5–5.2)
SODIUM SERPL-SCNC: 136 MEQ/L (ref 135–145)

## 2024-08-18 PROCEDURE — 36415 COLL VENOUS BLD VENIPUNCTURE: CPT

## 2024-08-18 PROCEDURE — 6370000000 HC RX 637 (ALT 250 FOR IP)

## 2024-08-18 PROCEDURE — 80048 BASIC METABOLIC PNL TOTAL CA: CPT

## 2024-08-18 PROCEDURE — 6370000000 HC RX 637 (ALT 250 FOR IP): Performed by: NURSE PRACTITIONER

## 2024-08-18 PROCEDURE — 2580000003 HC RX 258

## 2024-08-18 PROCEDURE — 99231 SBSQ HOSP IP/OBS SF/LOW 25: CPT | Performed by: PHYSICIAN ASSISTANT

## 2024-08-18 PROCEDURE — 6360000002 HC RX W HCPCS

## 2024-08-18 PROCEDURE — 82948 REAGENT STRIP/BLOOD GLUCOSE: CPT

## 2024-08-18 PROCEDURE — 1200000000 HC SEMI PRIVATE

## 2024-08-18 RX ADMIN — PANTOPRAZOLE SODIUM 40 MG: 40 TABLET, DELAYED RELEASE ORAL at 16:32

## 2024-08-18 RX ADMIN — HYDROCODONE BITARTRATE AND ACETAMINOPHEN 1 TABLET: 5; 325 TABLET ORAL at 16:32

## 2024-08-18 RX ADMIN — TRAZODONE HYDROCHLORIDE 200 MG: 100 TABLET ORAL at 20:28

## 2024-08-18 RX ADMIN — PANTOPRAZOLE SODIUM 40 MG: 40 TABLET, DELAYED RELEASE ORAL at 06:25

## 2024-08-18 RX ADMIN — CARVEDILOL 3.12 MG: 3.12 TABLET, FILM COATED ORAL at 20:27

## 2024-08-18 RX ADMIN — ESCITALOPRAM OXALATE 20 MG: 20 TABLET ORAL at 08:44

## 2024-08-18 RX ADMIN — FENOFIBRATE 160 MG: 160 TABLET ORAL at 08:44

## 2024-08-18 RX ADMIN — BUPROPION HYDROCHLORIDE 300 MG: 300 TABLET, EXTENDED RELEASE ORAL at 08:44

## 2024-08-18 RX ADMIN — ACETAMINOPHEN 650 MG: 325 TABLET ORAL at 08:43

## 2024-08-18 RX ADMIN — QUETIAPINE FUMARATE 350 MG: 400 TABLET ORAL at 20:27

## 2024-08-18 RX ADMIN — ATORVASTATIN CALCIUM 40 MG: 40 TABLET, FILM COATED ORAL at 20:27

## 2024-08-18 RX ADMIN — SODIUM CHLORIDE, PRESERVATIVE FREE 10 ML: 5 INJECTION INTRAVENOUS at 16:32

## 2024-08-18 RX ADMIN — ENOXAPARIN SODIUM 40 MG: 100 INJECTION SUBCUTANEOUS at 08:44

## 2024-08-18 RX ADMIN — SODIUM CHLORIDE, PRESERVATIVE FREE 10 ML: 5 INJECTION INTRAVENOUS at 20:28

## 2024-08-18 RX ADMIN — CARVEDILOL 3.12 MG: 3.12 TABLET, FILM COATED ORAL at 08:44

## 2024-08-18 RX ADMIN — LEVOTHYROXINE SODIUM 75 MCG: 0.07 TABLET ORAL at 06:25

## 2024-08-18 ASSESSMENT — PAIN SCALES - GENERAL
PAINLEVEL_OUTOF10: 7
PAINLEVEL_OUTOF10: 5
PAINLEVEL_OUTOF10: 0

## 2024-08-18 ASSESSMENT — PAIN SCALES - WONG BAKER
WONGBAKER_NUMERICALRESPONSE: NO HURT
WONGBAKER_NUMERICALRESPONSE: NO HURT

## 2024-08-18 NOTE — PROGRESS NOTES
Hospitalist Progress Note    Patient:  Elli Coulter      Unit/Bed:7K-09/009-A    YOB: 1955    MRN: [id number]       Acct: [id number]     PCP: Ryan Montalvo MD    Date of Admission: 8/8/2024    Assessment/Plan:    Left side perinephric abscess, recurrent  Urology consulted, saw patient, recommend outpatient f/u at [facility]  Received Zosyn, completed course  Patient is medically stable for d/c to SNF awaiting pre-CERT.    Fall/aspiration precautions.    Daily weights TRICIA's.  Primary hypertension.  On home meds.  Hydralazine as needed ordered additionally.  Deconditioning.  Due to multiple comorbidities.  Requires SNF placement.  PT OT.  COPD.  As needed albuterol.  Monitoring.  Type 2 diabetes mellitus.  Sliding scale insulin.  POC glucose checks AC plus at bedtime.  Hypoglycemic treatments.  Hyponatremia.  Resolved.  GERD.  On PPI  Hypothyroidism.  On Synthroid.  Bipolar disorder.  Home meds resumed.  On Lexapro/Wellbutrin, Seroquel  Tobacco smoking.  On nicotine patch.  Educated/counseled regarding quitting from smoking.  Mild anemia.  No signs of acute bleeding.  Noted.  Likely chronic disease.  Will continue monitoring.  Daily CBC.  Hyperlipidemia.  On Lipitor/trilidge.    Disposition:    [] Home       [] TCU       [] Rehab       [] Psych       [x] SNF- Await Pre-cert       [] Long Term Care Facility       [] Other-    Chief Complaint: Abdominal Pain      Subjective: 69 y.o. female admitted to the hospitalist service for abdominal pain.  Patient rates her pain an 8/10 in severity.  She denies chest pain.  She denies nausea or vomiting.      Medications:    Infusion Medications    sodium chloride 20 mL/hr at 08/15/24 2109    dextrose       Scheduled Medications    QUEtiapine  350 mg Oral Nightly    sodium chloride flush  5-40 mL IntraVENous 2 times per day    enoxaparin  40 mg SubCUTAneous Daily    lidocaine  2 patch TransDERmal Daily    insulin lispro  0-4 Units SubCUTAneous TID WC    insulin  NEGATIVE 08/08/2024 05:45 PM    WBCUA 15-25 08/08/2024 05:45 PM    WBCUA 0-2 04/19/2012 10:10 AM    BACTERIA FEW 08/08/2024 05:45 PM    RBCUA 50-75 08/08/2024 05:45 PM    BLOODU MODERATE 08/08/2024 05:45 PM    GLUCOSEU >= 1000 08/08/2024 05:45 PM       Radiology:  CT DRAINAGE HEMATOMA/SEROMA/FLUID COLLECTION   Final Result   Status post successful abscess drainage procedure..               **This report has been created using voice recognition software.  It may contain   minor errors which are inherent in voice recognition technology.**               Electronically signed by Dr Anshul Pablo      CT ABDOMEN PELVIS W IV CONTRAST Additional Contrast? None   Final Result   Impression:   1. Left perinephric abscess measures 3.2 x 2.3 x 5.4 cm is larger on    todays exam compared to 07/30/2024 and is having increased mass effect    upon the left kidney.   2. Left psoas/lateral abdominal abscess measures 2.8 x 8.9 x 7.9 cm and is    increased in size compared to prior CT      This document has been electronically signed by: Prerna Mccormick MD on    08/08/2024 08:22 PM      All CTs at this facility use dose modulation techniques and iterative    reconstructions, and/or weight-based dosing   when appropriate to reduce radiation to a low as reasonably achievable.          Diet: ADULT DIET; Regular; 3 carb choices (45 gm/meal)  ADULT ORAL NUTRITION SUPPLEMENT; Breakfast, Lunch, Dinner; Other Oral Supplement; Activia yogurt      Code Status: Full Code      Electronically signed by Yfn Casas PA-C on 8/18/2024 at 11:39 AM

## 2024-08-18 NOTE — PLAN OF CARE
Problem: Discharge Planning  Goal: Discharge to home or other facility with appropriate resources  Outcome: Progressing  Flowsheets (Taken 8/18/2024 0830)  Discharge to home or other facility with appropriate resources: Identify barriers to discharge with patient and caregiver     Problem: Pain  Goal: Verbalizes/displays adequate comfort level or baseline comfort level  Outcome: Progressing     Problem: ABCDS Injury Assessment  Goal: Absence of physical injury  Outcome: Progressing  Flowsheets (Taken 8/18/2024 0830)  Absence of Physical Injury: Implement safety measures based on patient assessment     Problem: Safety - Adult  Goal: Free from fall injury  Outcome: Progressing  Flowsheets (Taken 8/18/2024 0830)  Free From Fall Injury: Instruct family/caregiver on patient safety     Problem: Skin/Tissue Integrity  Goal: Absence of new skin breakdown  Description: 1.  Monitor for areas of redness and/or skin breakdown  2.  Assess vascular access sites hourly  3.  Every 4-6 hours minimum:  Change oxygen saturation probe site  4.  Every 4-6 hours:  If on nasal continuous positive airway pressure, respiratory therapy assess nares and determine need for appliance change or resting period.  Outcome: Progressing     Problem: Chronic Conditions and Co-morbidities  Goal: Patient's chronic conditions and co-morbidity symptoms are monitored and maintained or improved  Outcome: Progressing  Flowsheets (Taken 8/18/2024 0830)  Care Plan - Patient's Chronic Conditions and Co-Morbidity Symptoms are Monitored and Maintained or Improved: Monitor and assess patient's chronic conditions and comorbid symptoms for stability, deterioration, or improvement   Care plan reviewed with patient.  Patient verbalizes understanding of the plan of care and contribute to goal setting.

## 2024-08-19 LAB
ANION GAP SERPL CALC-SCNC: 12 MEQ/L (ref 8–16)
BUN SERPL-MCNC: 20 MG/DL (ref 7–22)
CALCIUM SERPL-MCNC: 10.6 MG/DL (ref 8.5–10.5)
CHLORIDE SERPL-SCNC: 102 MEQ/L (ref 98–111)
CO2 SERPL-SCNC: 24 MEQ/L (ref 23–33)
CREAT SERPL-MCNC: 0.8 MG/DL (ref 0.4–1.2)
GFR SERPL CREATININE-BSD FRML MDRD: 81 ML/MIN/1.73M2
GLUCOSE BLD STRIP.AUTO-MCNC: 155 MG/DL (ref 70–108)
GLUCOSE BLD STRIP.AUTO-MCNC: 187 MG/DL (ref 70–108)
GLUCOSE BLD STRIP.AUTO-MCNC: 189 MG/DL (ref 70–108)
GLUCOSE BLD STRIP.AUTO-MCNC: 249 MG/DL (ref 70–108)
GLUCOSE SERPL-MCNC: 152 MG/DL (ref 70–108)
POTASSIUM SERPL-SCNC: 4.1 MEQ/L (ref 3.5–5.2)
SODIUM SERPL-SCNC: 138 MEQ/L (ref 135–145)

## 2024-08-19 PROCEDURE — 97116 GAIT TRAINING THERAPY: CPT

## 2024-08-19 PROCEDURE — 1200000000 HC SEMI PRIVATE

## 2024-08-19 PROCEDURE — 6370000000 HC RX 637 (ALT 250 FOR IP): Performed by: NURSE PRACTITIONER

## 2024-08-19 PROCEDURE — 6360000002 HC RX W HCPCS

## 2024-08-19 PROCEDURE — 6370000000 HC RX 637 (ALT 250 FOR IP)

## 2024-08-19 PROCEDURE — 36415 COLL VENOUS BLD VENIPUNCTURE: CPT

## 2024-08-19 PROCEDURE — 99232 SBSQ HOSP IP/OBS MODERATE 35: CPT | Performed by: PHYSICIAN ASSISTANT

## 2024-08-19 PROCEDURE — 80048 BASIC METABOLIC PNL TOTAL CA: CPT

## 2024-08-19 PROCEDURE — 97535 SELF CARE MNGMENT TRAINING: CPT

## 2024-08-19 PROCEDURE — 82948 REAGENT STRIP/BLOOD GLUCOSE: CPT

## 2024-08-19 PROCEDURE — 97530 THERAPEUTIC ACTIVITIES: CPT

## 2024-08-19 PROCEDURE — 97110 THERAPEUTIC EXERCISES: CPT

## 2024-08-19 PROCEDURE — 2580000003 HC RX 258

## 2024-08-19 RX ADMIN — CARVEDILOL 3.12 MG: 3.12 TABLET, FILM COATED ORAL at 19:50

## 2024-08-19 RX ADMIN — ESCITALOPRAM OXALATE 20 MG: 20 TABLET ORAL at 08:39

## 2024-08-19 RX ADMIN — PANTOPRAZOLE SODIUM 40 MG: 40 TABLET, DELAYED RELEASE ORAL at 15:10

## 2024-08-19 RX ADMIN — HYDROCODONE BITARTRATE AND ACETAMINOPHEN 1 TABLET: 5; 325 TABLET ORAL at 23:07

## 2024-08-19 RX ADMIN — PANTOPRAZOLE SODIUM 40 MG: 40 TABLET, DELAYED RELEASE ORAL at 05:38

## 2024-08-19 RX ADMIN — SODIUM CHLORIDE, PRESERVATIVE FREE 10 ML: 5 INJECTION INTRAVENOUS at 19:51

## 2024-08-19 RX ADMIN — FERROUS SULFATE TAB 325 MG (65 MG ELEMENTAL FE) 325 MG: 325 (65 FE) TAB at 08:39

## 2024-08-19 RX ADMIN — QUETIAPINE FUMARATE 350 MG: 400 TABLET ORAL at 19:50

## 2024-08-19 RX ADMIN — ATORVASTATIN CALCIUM 40 MG: 40 TABLET, FILM COATED ORAL at 19:51

## 2024-08-19 RX ADMIN — SODIUM CHLORIDE, PRESERVATIVE FREE 10 ML: 5 INJECTION INTRAVENOUS at 08:39

## 2024-08-19 RX ADMIN — FLUTICASONE PROPIONATE 1 SPRAY: 50 SPRAY, METERED NASAL at 08:39

## 2024-08-19 RX ADMIN — HYDROCODONE BITARTRATE AND ACETAMINOPHEN 1 TABLET: 5; 325 TABLET ORAL at 08:39

## 2024-08-19 RX ADMIN — LEVOTHYROXINE SODIUM 75 MCG: 0.07 TABLET ORAL at 05:38

## 2024-08-19 RX ADMIN — ENOXAPARIN SODIUM 40 MG: 100 INJECTION SUBCUTANEOUS at 08:39

## 2024-08-19 RX ADMIN — BUPROPION HYDROCHLORIDE 300 MG: 300 TABLET, EXTENDED RELEASE ORAL at 08:39

## 2024-08-19 RX ADMIN — HYDROCODONE BITARTRATE AND ACETAMINOPHEN 1 TABLET: 5; 325 TABLET ORAL at 15:10

## 2024-08-19 RX ADMIN — FENOFIBRATE 160 MG: 160 TABLET ORAL at 08:39

## 2024-08-19 RX ADMIN — TRAZODONE HYDROCHLORIDE 200 MG: 100 TABLET ORAL at 19:50

## 2024-08-19 ASSESSMENT — PAIN - FUNCTIONAL ASSESSMENT
PAIN_FUNCTIONAL_ASSESSMENT: ACTIVITIES ARE NOT PREVENTED
PAIN_FUNCTIONAL_ASSESSMENT: ACTIVITIES ARE NOT PREVENTED
PAIN_FUNCTIONAL_ASSESSMENT: 0-10
PAIN_FUNCTIONAL_ASSESSMENT: 0-10

## 2024-08-19 ASSESSMENT — PAIN SCALES - GENERAL
PAINLEVEL_OUTOF10: 8
PAINLEVEL_OUTOF10: 6
PAINLEVEL_OUTOF10: 8
PAINLEVEL_OUTOF10: 7
PAINLEVEL_OUTOF10: 7
PAINLEVEL_OUTOF10: 8
PAINLEVEL_OUTOF10: 7
PAINLEVEL_OUTOF10: 8

## 2024-08-19 ASSESSMENT — PAIN DESCRIPTION - ORIENTATION: ORIENTATION: LEFT;LOWER

## 2024-08-19 ASSESSMENT — PAIN DESCRIPTION - DESCRIPTORS
DESCRIPTORS: ACHING
DESCRIPTORS: ACHING

## 2024-08-19 ASSESSMENT — PAIN DESCRIPTION - LOCATION: LOCATION: FLANK

## 2024-08-19 NOTE — PROGRESS NOTES
[facility]  [facility]  Occupational Therapy  Daily Note    Discharge Recommendations: Subacute/skilled nursing facility  Equipment Recommendations: Equipment Needed: No  Other: continue to monitor      Time In: 1343  Time Out: 1407  Timed Code Treatment Minutes: 24 Minutes  Minutes: 24          Date: 2024  Patient Name: Elli Coulter,   Gender: female      Room: [address] [place] [address]  MRN: [id number]  : 1955  (69 y.o.)  Referring Practitioner: Serenity Spears APRN - CNP  Diagnosis: Kindey, perinephric absecess  Additional Pertinent Hx: Per H&P: \"Elli Coulter is a 69 y.o. female with PMHx of Left perinephric abscess, COPD, DM who presented to [facility] with chief complaint of abdominal pain. Patient reports having increased left flank pain that radiates down her right leg over the past few days. Reports some associated nausea but denies emesis. Reports some fever and chills at ECF but does not know how high her temp was. Reports several ED visits and hospitalizations for the same symptoms and issues with her nephrostomy tube in the past. Denies any other symptoms. Denies headache, dizziness, or lightheadedness. Denies chest pain or SOB. Denies abdominal pain. Denies dysuria or hematuria.\"    Restrictions/Precautions:  Restrictions/Precautions: Fall Risk, General Precautions     Social/Functional History:  Lives With: Alone  Type of Home: Assisted living  Home Layout: One level  Home Access: Level entry  Home Equipment: Wheelchair - Manual, Walker - Rolling, Grab bars   Bathroom Shower/Tub: Walk-in shower  Bathroom Toilet: Standard  Bathroom Equipment: Grab bars in shower, Grab bars around toilet, Shower chair       ADL Assistance: Needs assistance (needs assistance to shower and toileting)  Ambulation Assistance: Independent  Transfer Assistance: Independent       Leisure & Hobbies: Play bingo  Additional Comments: used walker to ambulate, had assitance to shower and toileting  Applicable    ASSESSMENT:     Activity Tolerance:  Patient tolerance of  treatment: Good treatment tolerance      Plan: Times Per Week: 4-5x  Current Treatment Recommendations: Strengthening, Functional mobility training, Endurance training, Safety education & training, Patient/Caregiver education & training, Equipment evaluation, education, & procurement, Self-Care / ADL    Education:  Learners: Patient  Role of OT, Plan of Care, ADL's, IADL's, Home Safety, and Importance of Increasing Activity    Goals  Short Term Goals  Time Frame for Short Term Goals: by discharge  Short Term Goal 1: Pt will complete LB bathing/dressing with supervision in prep for increased independence with dressing and bathing  Short Term Goal 2: Pt will ambulate HH distances with SBA and 0 verbal cues for safety in prep for safe functional mobility in her home environment.  Short Term Goal 3: Pt will tolerate 12-15 min standing to complete grooming tasks with 1-2 UE release and supervision in prep for increased acitivity tolerance with ADL and IADL task.  Short Term Goal 4: Pt will complete various functional transfers with supervision in prep for safe toilet transfers  Long Term Goals  Time Frame for Long Term Goals : none due to ELOS    Following session, patient left in safe position with all fall risk precautions in place.

## 2024-08-19 NOTE — CARE COORDINATION
8/19/24, 3:44 PM EDT    DISCHARGE PLANNING EVALUATION       Provider aware of peer to peer is needed.

## 2024-08-19 NOTE — PROGRESS NOTES
L flank at drain site    Vitals: Vitals not assessed per clinical judgement, see nursing flowsheet    OBJECTIVE:  Bed Mobility:  Supine to Sit: Stand By Assistance, with head of bed flat, with rail, with increased time for completion  Sit to Supine: Stand By Assistance, with head of bed flat, with rail     Transfers:  Sit to Stand: Stand By Assistance  Stand to Sit:Stand By Assistance    Ambulation:  Stand By Assistance  Distance: 25 ft  Surface: Level Tile  Device: No Device  Gait Deviations: Decreased Step Length Bilaterally, Decreased Gait Speed, Narrow Base of Support, Mild Path Deviations, and Unsteady Gait     Stairs:  Not Tested    Balance:  Static Sitting Balance:  Supervision  Dynamic Sitting Balance: Supervision, Stand By Assistance  Static Standing Balance: Stand By Assistance  Dynamic Standing Balance: Stand By Assistance    Exercise:  Patient was guided in 1 set(s) 7-10 reps of exercises to both lower extremities: Ankle pumps, Glut sets, Quad sets, Heelslides, Hip abduction/adduction, and Long arc quads.  Exercises were completed for increased independence with functional mobility.    Functional Outcome Measures:  5 Times Sit to Stand   Current Score: 28.55 seconds (Date: 8/19/2024)    Interpretation of Score: The 5 Times Sit to Stand Test (FTSST) measures functional lower limb muscle strength and may be useful in quantifying functional change of transitional movements. Greater than 16.0 seconds indicates increased risk of falls. Cut-off scores of 16 seconds discriminates fallers from non-fallers. < 60 years old: 11 seconds = normal; 70-80 years old: 13 seconds = normal; 80-90 years old: 15 seconds = normal.,     [facility] (6 CLICK) BASIC MOBILITY     AM-PAC Inpatient Mobility without Stair Climbing Raw Score : 15  AM-PAC Inpatient without Stair Climbing T-Scale Score : 43.03  Mobility Inpatient CMS 0-100% Score: 47.43     Modified [place] Scale:  Not Applicable    ASSESSMENT:  Assessment: Patient  progressing toward established goals. Patient continues to demonstrate deficits in Strength, Balance, Endurance, and Gait mechanics and would benefit from continued skilled PT to address these impairments and return to PLOF.   Activity Tolerance:  Patient tolerance of  treatment:Fair.  Plan: Current Treatment Recommendations: Strengthening, Balance training, Functional mobility training, Transfer training, Gait training, Stair training, Safety education & training, Home exercise program, Therapeutic activities  General Plan:  (4-5x GM)    Education:  Learners: Patient  Patient Education: Plan of Care, Home Exercise Program    Goals:  Patient Goals : \"to get better.\"  Short Term Goals  Time Frame for Short Term Goals: Prior to hospital d/c  Short Term Goal 1: Pt will perform supine <> sitting EOB with IND  Short Term Goal 2: Pt will perform STSs to no AD with IND  Short Term Goal 3: Pt will ambulate 35ft with no AD and IND  Short Term Goal 4: Pt will ambualte 200ft with no AD and CGA  Short Term Goal 5: Pt will perform bed <> chair transfers with no AD and IND  Additional Goals?: Yes  Short Term Goal 6: Pt will score mod to low fall risk on Tinetti balance assessment  Long Term Goals  Time Frame for Long Term Goals : NA due to short ELOS    Following session, patient left in safe position with all fall risk precautions in place.     Radha Cisneros, MPT 2649

## 2024-08-19 NOTE — PLAN OF CARE
Problem: Discharge Planning  Goal: Discharge to home or other facility with appropriate resources  8/19/2024 0914 by Haylee Patel, RN  Outcome: Progressing  Flowsheets (Taken 8/19/2024 0914)  Discharge to home or other facility with appropriate resources:   Identify barriers to discharge with patient and caregiver   Identify discharge learning needs (meds, wound care, etc)   Arrange for needed discharge resources and transportation as appropriate   Refer to discharge planning if patient needs post-hospital services based on physician order or complex needs related to functional status, cognitive ability or social support system  8/18/2024 2224 by Annalisa Nobles, RN  Outcome: Not Progressing  Flowsheets (Taken 8/18/2024 2224)  Discharge to home or other facility with appropriate resources:   Identify barriers to discharge with patient and caregiver   Arrange for needed discharge resources and transportation as appropriate   Identify discharge learning needs (meds, wound care, etc)     Problem: Pain  Goal: Verbalizes/displays adequate comfort level or baseline comfort level  8/19/2024 0914 by Haylee Patel, RN  Outcome: Progressing  Flowsheets (Taken 8/19/2024 0914)  Verbalizes/displays adequate comfort level or baseline comfort level:   Encourage patient to monitor pain and request assistance   Administer analgesics based on type and severity of pain and evaluate response   Consider cultural and social influences on pain and pain management   Assess pain using appropriate pain scale   Implement non-pharmacological measures as appropriate and evaluate response   Notify Licensed Independent Practitioner if interventions unsuccessful or patient reports new pain  8/18/2024 2224 by Annalisa Nobles, RN  Outcome: Not Progressing  Flowsheets (Taken 8/18/2024 2224)  Verbalizes/displays adequate comfort level or baseline comfort level:   Encourage patient to monitor pain and request assistance   Assess pain using  Progressing  Flowsheets (Taken 8/19/2024 0914)  Verbalizes/displays adequate comfort level or baseline comfort level:   Encourage patient to monitor pain and request assistance   Administer analgesics based on type and severity of pain and evaluate response   Consider cultural and social influences on pain and pain management   Assess pain using appropriate pain scale   Implement non-pharmacological measures as appropriate and evaluate response   Notify Licensed Independent Practitioner if interventions unsuccessful or patient reports new pain  8/18/2024 2224 by Annalisa Nobles, RN  Outcome: Not Progressing  Flowsheets (Taken 8/18/2024 2224)  Verbalizes/displays adequate comfort level or baseline comfort level:   Encourage patient to monitor pain and request assistance   Assess pain using appropriate pain scale   Administer analgesics based on type and severity of pain and evaluate response   Implement non-pharmacological measures as appropriate and evaluate response     Problem: ABCDS Injury Assessment  Goal: Absence of physical injury  8/19/2024 0914 by Haylee Patel RN  Outcome: Progressing  Flowsheets (Taken 8/19/2024 0914)  Absence of Physical Injury: Implement safety measures based on patient assessment  8/18/2024 2224 by Annalisa Nobles RN  Outcome: Not Progressing  Flowsheets (Taken 8/18/2024 2224)  Absence of Physical Injury: Implement safety measures based on patient assessment     Problem: Safety - Adult  Goal: Free from fall injury  8/19/2024 0914 by Haylee Patel RN  Outcome: Progressing  Flowsheets (Taken 8/19/2024 0914)  Free From Fall Injury: Instruct family/caregiver on patient safety  8/18/2024 2224 by Annalisa Nobles RN  Outcome: Not Progressing  Flowsheets (Taken 8/18/2024 2224)  Free From Fall Injury: Instruct family/caregiver on patient safety     Problem: Skin/Tissue Integrity  Goal: Absence of new skin breakdown  Description: 1.  Monitor for areas of redness and/or skin breakdown  2.   Assess vascular access sites hourly  3.  Every 4-6 hours minimum:  Change oxygen saturation probe site  4.  Every 4-6 hours:  If on nasal continuous positive airway pressure, respiratory therapy assess nares and determine need for appliance change or resting period.  8/19/2024 0914 by Haylee Patel, RN  Outcome: Progressing  8/18/2024 2224 by Annalisa Nobles RN  Outcome: Not Progressing     Problem: Chronic Conditions and Co-morbidities  Goal: Patient's chronic conditions and co-morbidity symptoms are monitored and maintained or improved  8/19/2024 0914 by Haylee Patel, RN  Outcome: Progressing  Flowsheets (Taken 8/19/2024 0914)  Care Plan - Patient's Chronic Conditions and Co-Morbidity Symptoms are Monitored and Maintained or Improved:   Monitor and assess patient's chronic conditions and comorbid symptoms for stability, deterioration, or improvement   Collaborate with multidisciplinary team to address chronic and comorbid conditions and prevent exacerbation or deterioration   Update acute care plan with appropriate goals if chronic or comorbid symptoms are exacerbated and prevent overall improvement and discharge  8/18/2024 2224 by Annalisa Nobles, RN  Outcome: Not Progressing  Flowsheets (Taken 8/18/2024 2224)  Care Plan - Patient's Chronic Conditions and Co-Morbidity Symptoms are Monitored and Maintained or Improved:   Monitor and assess patient's chronic conditions and comorbid symptoms for stability, deterioration, or improvement   Collaborate with multidisciplinary team to address chronic and comorbid conditions and prevent exacerbation or deterioration     Care plan reviewed with patient. Patient verbalizes understanding of plan of care and contributes to goal setting.

## 2024-08-19 NOTE — PROGRESS NOTES
Hospitalist Progress Note    Patient:  Elli Coulter      Unit/Bed:7K-09/009-A    YOB: 1955    MRN: [id number]       Acct: [id number]     PCP: Ryan Montalvo MD    Date of Admission: 8/8/2024    Assessment/Plan:    Left side perinephric abscess, recurrent  Urology consulted, saw patient, recommend outpatient f/u at [facility]  Received Zosyn, completed course  Patient is medically stable for d/c to SNF awaiting pre-CERT. Case discussed with , insurance requiring peer to peer, though want updated therapy notes prior to completing  Fall/aspiration precautions.    Daily weights TRICIA's.  Primary hypertension.  On home meds.  Hydralazine as needed ordered additionally.  Deconditioning.  Due to multiple comorbidities.  Requires SNF placement.  PT OT.  COPD.  As needed albuterol.  Monitoring.  Type 2 diabetes mellitus.  Sliding scale insulin.  POC glucose checks AC plus at bedtime.  Hypoglycemic treatments. Latest POCT glucose- 189  Hyponatremia.  Resolved.  GERD.  On PPI  Hypothyroidism.  On Synthroid.  Bipolar disorder.  Home meds resumed.  On Lexapro/Wellbutrin, Seroquel  Tobacco smoking.  On nicotine patch.  Educated/counseled regarding quitting from smoking.  Mild anemia.  No signs of acute bleeding.  Noted.  Likely chronic disease.  Will continue monitoring.  Daily CBC.  Hyperlipidemia.  On Lipitor/trilidge.    Disposition:    [] Home       [] TCU       [] Rehab       [] Psych       [x] SNF- Await Pre-cert       [] Long Term Care Facility       [] Other-    Chief Complaint: Abdominal Pain      Subjective: 69 y.o. female admitted to the hospitalist service for abdominal pain.  Patient reports feeling a little bit better. She denies nausea or vomiting. Await updated therapy notes for discharge.      Medications:    Infusion Medications    sodium chloride 20 mL/hr at 08/15/24 2109    dextrose       Scheduled Medications    QUEtiapine  350 mg Oral Nightly    sodium chloride flush  5-40 mL  IntraVENous 2 times per day    enoxaparin  40 mg SubCUTAneous Daily    lidocaine  2 patch TransDERmal Daily    insulin lispro  0-4 Units SubCUTAneous TID WC    insulin lispro  0-4 Units SubCUTAneous Nightly    atorvastatin  40 mg Oral Nightly    buPROPion  300 mg Oral QAM    carvedilol  3.125 mg Oral BID    escitalopram  20 mg Oral Daily    fenofibrate  160 mg Oral Daily    ferrous sulfate  325 mg Oral Q48H    fluticasone  1 spray Each Nostril BID    levothyroxine  75 mcg Oral QAM AC    pantoprazole  40 mg Oral BID AC    traZODone  200 mg Oral Nightly     PRN Meds: HYDROcodone-acetaminophen, sodium chloride flush, sodium chloride, acetaminophen **OR** acetaminophen, glucose, dextrose bolus **OR** dextrose bolus, glucagon (rDNA), dextrose, albuterol      Intake/Output Summary (Last 24 hours) at 8/19/2024 1944  Last data filed at 8/19/2024 1507  Gross per 24 hour   Intake 860 ml   Output 150 ml   Net 710 ml       Diet:  ADULT DIET; Regular; 3 carb choices (45 gm/meal)  ADULT ORAL NUTRITION SUPPLEMENT; Breakfast, Lunch, Dinner; Other Oral Supplement; Activia yogurt    Exam:  BP (!) 148/78   Pulse 70   Temp 97.3 °F (36.3 °C) (Oral)   Resp 20   Ht 1.626 m (5' 4\")   Wt 75.1 kg (165 lb 9.1 oz)   SpO2 97%   BMI 28.42 kg/m²     Physical Exam  Constitutional:       Interventions: She is not intubated.  Cardiovascular:      Rate and Rhythm: Normal rate and regular rhythm.      Heart sounds: Murmur heard.   Pulmonary:      Effort: She is not intubated.   Abdominal:      Comments: Drain in place, left flank   Neurological:      Mental Status: She is alert.   Psychiatric:         Speech: She is communicative.        Labs:   No results for input(s): \"WBC\", \"HGB\", \"HCT\", \"PLT\" in the last 72 hours.    Recent Labs     08/17/24  0633 08/18/24  0629 08/19/24  0559    136 138   K 4.5 4.5 4.1    104 102   CO2 24 21* 24   BUN 16 18 20   CREATININE 0.8 0.7 0.8   CALCIUM 9.8 10.4 10.6*     No results for input(s): \"AST\",  \"ALT\", \"BILIDIR\", \"BILITOT\", \"ALKPHOS\" in the last 72 hours.  No results for input(s): \"INR\" in the last 72 hours.  No results for input(s): \"CKTOTAL\", \"TROPONINI\" in the last 72 hours.    Urinalysis:      Lab Results   Component Value Date/Time    NITRU NEGATIVE 08/08/2024 05:45 PM    WBCUA 15-25 08/08/2024 05:45 PM    WBCUA 0-2 04/19/2012 10:10 AM    BACTERIA FEW 08/08/2024 05:45 PM    RBCUA 50-75 08/08/2024 05:45 PM    BLOODU MODERATE 08/08/2024 05:45 PM    GLUCOSEU >= 1000 08/08/2024 05:45 PM       Radiology:  CT DRAINAGE HEMATOMA/SEROMA/FLUID COLLECTION   Final Result   Status post successful abscess drainage procedure..               **This report has been created using voice recognition software.  It may contain   minor errors which are inherent in voice recognition technology.**               Electronically signed by Dr Anshul Pablo      CT ABDOMEN PELVIS W IV CONTRAST Additional Contrast? None   Final Result   Impression:   1. Left perinephric abscess measures 3.2 x 2.3 x 5.4 cm is larger on    todays exam compared to 07/30/2024 and is having increased mass effect    upon the left kidney.   2. Left psoas/lateral abdominal abscess measures 2.8 x 8.9 x 7.9 cm and is    increased in size compared to prior CT      This document has been electronically signed by: Prerna Mccormick MD on    08/08/2024 08:22 PM      All CTs at this facility use dose modulation techniques and iterative    reconstructions, and/or weight-based dosing   when appropriate to reduce radiation to a low as reasonably achievable.          Diet: ADULT DIET; Regular; 3 carb choices (45 gm/meal)  ADULT ORAL NUTRITION SUPPLEMENT; Breakfast, Lunch, Dinner; Other Oral Supplement; Activia yogurt      Code Status: Full Code      Electronically signed by Yfn Casas PA-C on 8/19/2024 at 7:44 PM

## 2024-08-19 NOTE — PLAN OF CARE
Problem: Discharge Planning  Goal: Discharge to home or other facility with appropriate resources  8/18/2024 2224 by Annalisa Nobles, RN  Outcome: Not Progressing  Flowsheets (Taken 8/18/2024 2224)  Discharge to home or other facility with appropriate resources:   Identify barriers to discharge with patient and caregiver   Arrange for needed discharge resources and transportation as appropriate   Identify discharge learning needs (meds, wound care, etc)     Problem: Pain  Goal: Verbalizes/displays adequate comfort level or baseline comfort level  8/18/2024 2224 by Annalisa Nobles, RN  Outcome: Not Progressing  Flowsheets (Taken 8/18/2024 2224)  Verbalizes/displays adequate comfort level or baseline comfort level:   Encourage patient to monitor pain and request assistance   Assess pain using appropriate pain scale   Administer analgesics based on type and severity of pain and evaluate response   Implement non-pharmacological measures as appropriate and evaluate response     Problem: ABCDS Injury Assessment  Goal: Absence of physical injury  8/18/2024 2224 by Annalisa Nobles, RN  Outcome: Not Progressing  Flowsheets (Taken 8/18/2024 2224)  Absence of Physical Injury: Implement safety measures based on patient assessment     Problem: Safety - Adult  Goal: Free from fall injury  8/18/2024 2224 by Annalisa Nobels, RN  Outcome: Not Progressing  Flowsheets (Taken 8/18/2024 2224)  Free From Fall Injury: Instruct family/caregiver on patient safety     Problem: Skin/Tissue Integrity  Goal: Absence of new skin breakdown  Description: 1.  Monitor for areas of redness and/or skin breakdown  2.  Assess vascular access sites hourly  3.  Every 4-6 hours minimum:  Change oxygen saturation probe site  4.  Every 4-6 hours:  If on nasal continuous positive airway pressure, respiratory therapy assess nares and determine need for appliance change or resting period.  8/18/2024 2224 by Annalisa Nobles, RN  Outcome: Not Progressing      Problem: Chronic Conditions and Co-morbidities  Goal: Patient's chronic conditions and co-morbidity symptoms are monitored and maintained or improved  8/18/2024 2224 by Annalisa Nobles RN  Outcome: Not Progressing  Flowsheets (Taken 8/18/2024 2224)  Care Plan - Patient's Chronic Conditions and Co-Morbidity Symptoms are Monitored and Maintained or Improved:   Monitor and assess patient's chronic conditions and comorbid symptoms for stability, deterioration, or improvement   Collaborate with multidisciplinary team to address chronic and comorbid conditions and prevent exacerbation or deterioration     Care plan reviewed with patient. Patient verbalizes understanding of the plan of care and contributes to goal setting.

## 2024-08-20 LAB
EKG ATRIAL RATE: 69 BPM
EKG P AXIS: 62 DEGREES
EKG P-R INTERVAL: 154 MS
EKG Q-T INTERVAL: 424 MS
EKG QRS DURATION: 94 MS
EKG QTC CALCULATION (BAZETT): 454 MS
EKG R AXIS: 0 DEGREES
EKG T AXIS: 54 DEGREES
EKG VENTRICULAR RATE: 69 BPM
GLUCOSE BLD STRIP.AUTO-MCNC: 159 MG/DL (ref 70–108)
GLUCOSE BLD STRIP.AUTO-MCNC: 176 MG/DL (ref 70–108)
GLUCOSE BLD STRIP.AUTO-MCNC: 190 MG/DL (ref 70–108)
GLUCOSE BLD STRIP.AUTO-MCNC: 222 MG/DL (ref 70–108)
TROPONIN, HIGH SENSITIVITY: 11 NG/L (ref 0–12)

## 2024-08-20 PROCEDURE — 93005 ELECTROCARDIOGRAM TRACING: CPT | Performed by: PHYSICIAN ASSISTANT

## 2024-08-20 PROCEDURE — 84484 ASSAY OF TROPONIN QUANT: CPT

## 2024-08-20 PROCEDURE — 36415 COLL VENOUS BLD VENIPUNCTURE: CPT

## 2024-08-20 PROCEDURE — 97530 THERAPEUTIC ACTIVITIES: CPT

## 2024-08-20 PROCEDURE — 6370000000 HC RX 637 (ALT 250 FOR IP): Performed by: NURSE PRACTITIONER

## 2024-08-20 PROCEDURE — 93010 ELECTROCARDIOGRAM REPORT: CPT | Performed by: INTERNAL MEDICINE

## 2024-08-20 PROCEDURE — 99233 SBSQ HOSP IP/OBS HIGH 50: CPT | Performed by: PHYSICIAN ASSISTANT

## 2024-08-20 PROCEDURE — 2580000003 HC RX 258

## 2024-08-20 PROCEDURE — 82948 REAGENT STRIP/BLOOD GLUCOSE: CPT

## 2024-08-20 PROCEDURE — 6360000002 HC RX W HCPCS

## 2024-08-20 PROCEDURE — 1200000000 HC SEMI PRIVATE

## 2024-08-20 PROCEDURE — 6370000000 HC RX 637 (ALT 250 FOR IP)

## 2024-08-20 PROCEDURE — 97116 GAIT TRAINING THERAPY: CPT

## 2024-08-20 RX ADMIN — ATORVASTATIN CALCIUM 40 MG: 40 TABLET, FILM COATED ORAL at 21:31

## 2024-08-20 RX ADMIN — SODIUM CHLORIDE, PRESERVATIVE FREE 10 ML: 5 INJECTION INTRAVENOUS at 08:27

## 2024-08-20 RX ADMIN — HYDROCODONE BITARTRATE AND ACETAMINOPHEN 1 TABLET: 5; 325 TABLET ORAL at 08:27

## 2024-08-20 RX ADMIN — CARVEDILOL 3.12 MG: 3.12 TABLET, FILM COATED ORAL at 21:31

## 2024-08-20 RX ADMIN — ESCITALOPRAM OXALATE 20 MG: 20 TABLET ORAL at 08:27

## 2024-08-20 RX ADMIN — PANTOPRAZOLE SODIUM 40 MG: 40 TABLET, DELAYED RELEASE ORAL at 15:29

## 2024-08-20 RX ADMIN — ENOXAPARIN SODIUM 40 MG: 100 INJECTION SUBCUTANEOUS at 08:27

## 2024-08-20 RX ADMIN — FENOFIBRATE 160 MG: 160 TABLET ORAL at 08:27

## 2024-08-20 RX ADMIN — FLUTICASONE PROPIONATE 1 SPRAY: 50 SPRAY, METERED NASAL at 08:27

## 2024-08-20 RX ADMIN — SODIUM CHLORIDE, PRESERVATIVE FREE 10 ML: 5 INJECTION INTRAVENOUS at 21:33

## 2024-08-20 RX ADMIN — INSULIN LISPRO 1 UNITS: 100 INJECTION, SOLUTION INTRAVENOUS; SUBCUTANEOUS at 17:32

## 2024-08-20 RX ADMIN — HYDROCODONE BITARTRATE AND ACETAMINOPHEN 1 TABLET: 5; 325 TABLET ORAL at 15:29

## 2024-08-20 RX ADMIN — BUPROPION HYDROCHLORIDE 300 MG: 300 TABLET, EXTENDED RELEASE ORAL at 08:27

## 2024-08-20 RX ADMIN — TRAZODONE HYDROCHLORIDE 200 MG: 100 TABLET ORAL at 21:33

## 2024-08-20 RX ADMIN — QUETIAPINE FUMARATE 350 MG: 400 TABLET ORAL at 21:31

## 2024-08-20 RX ADMIN — LEVOTHYROXINE SODIUM 75 MCG: 0.07 TABLET ORAL at 05:16

## 2024-08-20 RX ADMIN — PANTOPRAZOLE SODIUM 40 MG: 40 TABLET, DELAYED RELEASE ORAL at 05:16

## 2024-08-20 RX ADMIN — HYDROCODONE BITARTRATE AND ACETAMINOPHEN 1 TABLET: 5; 325 TABLET ORAL at 21:33

## 2024-08-20 ASSESSMENT — PAIN - FUNCTIONAL ASSESSMENT
PAIN_FUNCTIONAL_ASSESSMENT: 0-10
PAIN_FUNCTIONAL_ASSESSMENT: ACTIVITIES ARE NOT PREVENTED
PAIN_FUNCTIONAL_ASSESSMENT: 0-10

## 2024-08-20 ASSESSMENT — PAIN DESCRIPTION - DESCRIPTORS
DESCRIPTORS: ACHING
DESCRIPTORS: ACHING;SHARP
DESCRIPTORS: ACHING;SHARP

## 2024-08-20 ASSESSMENT — PAIN DESCRIPTION - LOCATION
LOCATION: FLANK
LOCATION: FLANK

## 2024-08-20 ASSESSMENT — PAIN DESCRIPTION - ORIENTATION
ORIENTATION: LEFT;LOWER
ORIENTATION: LEFT;LOWER

## 2024-08-20 ASSESSMENT — PAIN SCALES - GENERAL
PAINLEVEL_OUTOF10: 8
PAINLEVEL_OUTOF10: 9
PAINLEVEL_OUTOF10: 7
PAINLEVEL_OUTOF10: 9
PAINLEVEL_OUTOF10: 8

## 2024-08-20 ASSESSMENT — PAIN DESCRIPTION - FREQUENCY: FREQUENCY: CONTINUOUS

## 2024-08-20 ASSESSMENT — PAIN DESCRIPTION - ONSET: ONSET: ON-GOING

## 2024-08-20 ASSESSMENT — PAIN DESCRIPTION - DIRECTION: RADIATING_TOWARDS: ACROSS LOWER BACK

## 2024-08-20 ASSESSMENT — PAIN DESCRIPTION - PAIN TYPE: TYPE: SURGICAL PAIN

## 2024-08-20 NOTE — PLAN OF CARE
Problem: Discharge Planning  Goal: Discharge to home or other facility with appropriate resources  8/20/2024 0906 by Haylee Patel, RN  Outcome: Progressing  Flowsheets (Taken 8/20/2024 0906)  Discharge to home or other facility with appropriate resources:   Identify barriers to discharge with patient and caregiver   Identify discharge learning needs (meds, wound care, etc)   Refer to discharge planning if patient needs post-hospital services based on physician order or complex needs related to functional status, cognitive ability or social support system   Arrange for needed discharge resources and transportation as appropriate  8/20/2024 0050 by Annalisa Nobles, RN  Outcome: Progressing  Flowsheets (Taken 8/20/2024 0050)  Discharge to home or other facility with appropriate resources:   Identify barriers to discharge with patient and caregiver   Arrange for needed discharge resources and transportation as appropriate   Identify discharge learning needs (meds, wound care, etc)     Problem: Pain  Goal: Verbalizes/displays adequate comfort level or baseline comfort level  8/20/2024 0906 by Haylee Patel, RN  Outcome: Progressing  Flowsheets (Taken 8/20/2024 0906)  Verbalizes/displays adequate comfort level or baseline comfort level:   Encourage patient to monitor pain and request assistance   Administer analgesics based on type and severity of pain and evaluate response   Consider cultural and social influences on pain and pain management   Assess pain using appropriate pain scale   Implement non-pharmacological measures as appropriate and evaluate response   Notify Licensed Independent Practitioner if interventions unsuccessful or patient reports new pain  8/20/2024 0050 by Annalisa Nobles, RN  Outcome: Progressing  Flowsheets (Taken 8/20/2024 0050)  Verbalizes/displays adequate comfort level or baseline comfort level:   Encourage patient to monitor pain and request assistance   Assess pain using appropriate

## 2024-08-20 NOTE — PROGRESS NOTES
[facility]  OCCUPATIONAL THERAPY MISSED TREATMENT NOTE  [facility] 7K  7K-09/009-A      Date: 2024  Patient Name: Elli Coulter        CSN: [id number]   : 1955  (69 y.o.)  Gender: female   Referring Practitioner: Serenity Spears APRN - CNP  Diagnosis: Kindey, perinephric absecess         REASON FOR MISSED TREATMENT:  PT with Pt will check back as time allows.

## 2024-08-20 NOTE — PLAN OF CARE
Problem: Discharge Planning  Goal: Discharge to home or other facility with appropriate resources  Outcome: Progressing  Flowsheets (Taken 8/20/2024 0050)  Discharge to home or other facility with appropriate resources:   Identify barriers to discharge with patient and caregiver   Arrange for needed discharge resources and transportation as appropriate   Identify discharge learning needs (meds, wound care, etc)     Problem: Pain  Goal: Verbalizes/displays adequate comfort level or baseline comfort level  Outcome: Progressing  Flowsheets (Taken 8/20/2024 0050)  Verbalizes/displays adequate comfort level or baseline comfort level:   Encourage patient to monitor pain and request assistance   Assess pain using appropriate pain scale   Administer analgesics based on type and severity of pain and evaluate response   Implement non-pharmacological measures as appropriate and evaluate response     Problem: ABCDS Injury Assessment  Goal: Absence of physical injury  Outcome: Progressing  Flowsheets (Taken 8/20/2024 0050)  Absence of Physical Injury: Implement safety measures based on patient assessment     Problem: Safety - Adult  Goal: Free from fall injury  Outcome: Progressing  Flowsheets (Taken 8/20/2024 0050)  Free From Fall Injury: Instruct family/caregiver on patient safety     Problem: Skin/Tissue Integrity  Goal: Absence of new skin breakdown  Description: 1.  Monitor for areas of redness and/or skin breakdown  2.  Assess vascular access sites hourly  3.  Every 4-6 hours minimum:  Change oxygen saturation probe site  4.  Every 4-6 hours:  If on nasal continuous positive airway pressure, respiratory therapy assess nares and determine need for appliance change or resting period.  Outcome: Progressing     Problem: Chronic Conditions and Co-morbidities  Goal: Patient's chronic conditions and co-morbidity symptoms are monitored and maintained or improved  Outcome: Progressing  Flowsheets (Taken 8/20/2024 0050)  Care Plan  - Patient's Chronic Conditions and Co-Morbidity Symptoms are Monitored and Maintained or Improved:   Monitor and assess patient's chronic conditions and comorbid symptoms for stability, deterioration, or improvement   Collaborate with multidisciplinary team to address chronic and comorbid conditions and prevent exacerbation or deterioration     Care plan reviewed with patient. Patient verbalizes understanding of the plan of care and contributes to goal setting.

## 2024-08-20 NOTE — PROGRESS NOTES
[facility]  INPATIENT PHYSICAL THERAPY  DAILY NOTE  [facility] 7K - 7K-09/009-A      Discharge Recommendations: Subacte/Skilled Nursing Facility  Equipment Recommendations:Equipment Needed: No      Time In: 911  Time Out: 938  Timed Code Treatment Minutes: 27 Minutes  Minutes: 27          Date: 2024  Patient Name: Elli Coulter,  Gender:  female        MRN: [id number]  : 1955  (69 y.o.)     Referring Practitioner: Serenity Spears APRN - CNP  Diagnosis: Kidney, perinephric abcess  Additional Pertinent Hx: Per H&P: \"Elli Coulter is a 69 y.o. female with PMHx of Left perinephric abscess, COPD, DM who presented to [facility] with chief complaint of abdominal pain. Patient reports having increased left flank pain that radiates down her right leg over the past few days. Reports some associated nausea but denies emesis. Reports some fever and chills at ECF but does not know how high her temp was. Reports several ED visits and hospitalizations for the same symptoms and issues with her nephrostomy tube in the past. Denies any other symptoms. Denies headache, dizziness, or lightheadedness. Denies chest pain or SOB. Denies abdominal pain. Denies dysuria or hematuria.\"     Prior Level of Function:  Lives With: Alone  Type of Home: Assisted living  Home Layout: One level  Home Access: Level entry  Home Equipment: Wheelchair - Manual, Walker - Rolling, Grab bars   Bathroom Shower/Tub: Walk-in shower  Bathroom Toilet: Standard  Bathroom Equipment: Grab bars in shower, Grab bars around toilet, Shower chair    ADL Assistance: Needs assistance (needs assistance to shower and toileting)  Ambulation Assistance: Independent  Transfer Assistance: Independent  Additional Comments: used walker to ambulate, had assitance to shower and toileting prn    Restrictions/Precautions:  Restrictions/Precautions: Fall Risk, General Precautions     SUBJECTIVE: Pt in bed upon arrival, and agrees to therapy, RN  approved session, Pt A&O X 4/4, Pt requires encouragement to participate, pt Lethargic/falling asleep. Pt reporting dizziness when she gets up.     PAIN: nephrostomy tube site: 7/10    Vitals:   Vitals:    08/20/24 0824   BP: (!) 125/58   Pulse: 59   Resp: 16   Temp: 97.5 °F (36.4 °C)   SpO2: 98%     OBJECTIVE:  Bed Mobility:  Supine to Sit: Contact Guard Assistance, with head of bed raised, with rail, with verbal cues , with increased time for completion  Sit to Supine: Contact Guard Assistance, with head of bed raised, with rail, with verbal cues , with increased time for completion   Scooting: Contact Guard Assistance, Minimal Assistance, with verbal cues , with increased time for completion    Transfers:  Sit to Stand: Contact Guard Assistance  Stand to Sit:Contact Guard Assistance    Ambulation:  Contact Guard Assistance  Distance: 15ftx2  Surface: Level Tile  Device: No Device  Gait Deviations: Forward Flexed Posture, Slow Marisela, Decreased Step Length Bilaterally, Decreased Gait Speed, Decreased Heel Strike Bilaterally, Mild Path Deviations, and Unsteady Gait. Decreased Arm Swing  Pt did c/o dizziness with gait    Balance:  Pt completed functional tasks at toilet with assist for stability and safety. Various functional tasks completed with and without UE support in sitting and standing, pt able to complete tasks without physical assist, No LOBs, extra time to complete    Exercise:  Patient was guided in 1 set(s) 10 reps of exercises to both lower extremities: Ankle pumps, Glut sets, Quad sets, Heelslides, Short arc quads, Hip abduction/adduction and Straight leg raises.  Exercises were completed for increased independence with functional mobility.    Functional Outcome Measures:  5 Times Sit to Stand   Current Score: 38.42 seconds (Date: 8/20/2024)    Interpretation of Score: The 5 Times Sit to Stand Test (FTSST) measures functional lower limb muscle strength and may be useful in quantifying functional

## 2024-08-20 NOTE — PROCEDURES
PROCEDURE NOTE  Date: 8/20/2024   Name: Elli Coulter  YOB: 1955    Procedures  EKG completed, given to Haylee PUTNAM

## 2024-08-20 NOTE — PROGRESS NOTES
Hospitalist Progress Note    Patient:  Elli Coulter      Unit/Bed:7K-09/009-A    YOB: 1955    MRN: [id number]       Acct: [id number]     PCP: Ryan Montalvo MD    Date of Admission: 8/8/2024    Assessment/Plan:    Left side perinephric abscess, recurrent  Urology consulted, saw patient, recommend outpatient f/u at [facility]  Received Zosyn, completed course  Patient is medically stable for d/c to SNF awaiting pre-CERT.  Peer to peer attempted this morning, however patient already denied.  Case discussed with . Number for appeal provided. Contacted appeal number, they want updated therapy notes and they will expedite appeal process.  Reports 72-hour window. Patient remained stable for discharge at this time.  Fall/aspiration precautions.    Daily weights TRICIA's.  Chest Pain, Unspecified: Patient reports constant chest pain today  EKG and troponin ordered  Consider Echo, murmur noted on exam  Primary hypertension.  On home meds.  Hydralazine as needed ordered additionally.  Deconditioning.  Due to multiple comorbidities.  Requires SNF placement.  PT OT.  COPD.  As needed albuterol.  Monitoring.  Type 2 diabetes mellitus.  Sliding scale insulin.  POC glucose checks AC plus at bedtime.  Hypoglycemic treatments. Latest POCT glucose- 176  Hyponatremia.  Resolved.  GERD.  On PPI  Hypothyroidism.  On Synthroid.  Bipolar disorder.  Home meds resumed.  On Lexapro/Wellbutrin, Seroquel  Tobacco smoking.  On nicotine patch.  Educated/counseled regarding quitting from smoking.  Mild anemia.  No signs of acute bleeding.  Noted.  Likely chronic disease.  Will continue monitoring.  Daily CBC.  Hyperlipidemia.  On Lipitor/trilidge.    Disposition:    [] Home       [] TCU       [] Rehab       [] Psych       [x] SNF- Await Pre-cert       [] Long Term Care Facility       [] Other-    Chief Complaint: Abdominal Pain      Subjective: 69 y.o. female admitted to the hospitalist service for abdominal pain.

## 2024-08-20 NOTE — CARE COORDINATION
8/20/24, 3:04 PM EDT    DISCHARGE ON GOING EVALUATION    Elli CHEATHAM [facility] day: 12  Location: [facility]09/009-A Reason for admit: Kidney, perinephric abscess [N15.1]  Perinephric abscess [N15.1]  Psoas abscess (HCC) [K68.12]     Procedures:   8/9 CT guided drain placement by IR     Imaging since last note: none    Barriers to Discharge: Medically clear per hospitalist. Awaiting [id number] decision. Jim Casas completed peer to peer today. PT/OT notes updated yesterday with PT again today, per peer to peer request.     PCP: Ryan Montalvo MD  Readmission Risk Score: 22.9    Patient Goals/Plan/Treatment Preferences: [id number] after precert denied to [facility]. Awaiting decision.

## 2024-08-20 NOTE — PROGRESS NOTES
[facility]  OCCUPATIONAL THERAPY MISSED TREATMENT NOTE  [facility]  7K-09/009-A      Date: 2024  Patient Name: Elli Coulter        CSN: [id number]   : 1955  (69 y.o.)  Gender: female   Referring Practitioner: Serenity Spears APRN - CNP  Diagnosis: Kindey, perinephric absecess         REASON FOR MISSED TREATMENT: Patient Refused.  Pt was finishing her supper when attempted a second time. She asked to not do any more therapy this day.  Will retry tomorrow.

## 2024-08-21 LAB
GLUCOSE BLD STRIP.AUTO-MCNC: 181 MG/DL (ref 70–108)
GLUCOSE BLD STRIP.AUTO-MCNC: 194 MG/DL (ref 70–108)
GLUCOSE BLD STRIP.AUTO-MCNC: 258 MG/DL (ref 70–108)
GLUCOSE BLD STRIP.AUTO-MCNC: 265 MG/DL (ref 70–108)

## 2024-08-21 PROCEDURE — 82948 REAGENT STRIP/BLOOD GLUCOSE: CPT

## 2024-08-21 PROCEDURE — 6360000002 HC RX W HCPCS

## 2024-08-21 PROCEDURE — 2580000003 HC RX 258

## 2024-08-21 PROCEDURE — 6370000000 HC RX 637 (ALT 250 FOR IP): Performed by: NURSE PRACTITIONER

## 2024-08-21 PROCEDURE — 1200000000 HC SEMI PRIVATE

## 2024-08-21 PROCEDURE — 99232 SBSQ HOSP IP/OBS MODERATE 35: CPT | Performed by: PHYSICIAN ASSISTANT

## 2024-08-21 PROCEDURE — 6370000000 HC RX 637 (ALT 250 FOR IP)

## 2024-08-21 PROCEDURE — 97535 SELF CARE MNGMENT TRAINING: CPT

## 2024-08-21 RX ORDER — INSULIN LISPRO 100 [IU]/ML
0-4 INJECTION, SOLUTION INTRAVENOUS; SUBCUTANEOUS NIGHTLY
Status: DISCONTINUED | OUTPATIENT
Start: 2024-08-21 | End: 2024-08-21

## 2024-08-21 RX ORDER — INSULIN LISPRO 100 [IU]/ML
0-16 INJECTION, SOLUTION INTRAVENOUS; SUBCUTANEOUS
Status: DISCONTINUED | OUTPATIENT
Start: 2024-08-21 | End: 2024-08-27 | Stop reason: HOSPADM

## 2024-08-21 RX ORDER — INSULIN LISPRO 100 [IU]/ML
0-4 INJECTION, SOLUTION INTRAVENOUS; SUBCUTANEOUS NIGHTLY
Status: DISCONTINUED | OUTPATIENT
Start: 2024-08-21 | End: 2024-08-27 | Stop reason: HOSPADM

## 2024-08-21 RX ORDER — INSULIN LISPRO 100 [IU]/ML
0-8 INJECTION, SOLUTION INTRAVENOUS; SUBCUTANEOUS
Status: DISCONTINUED | OUTPATIENT
Start: 2024-08-21 | End: 2024-08-21

## 2024-08-21 RX ADMIN — HYDROCODONE BITARTRATE AND ACETAMINOPHEN 1 TABLET: 5; 325 TABLET ORAL at 05:05

## 2024-08-21 RX ADMIN — ESCITALOPRAM OXALATE 20 MG: 20 TABLET ORAL at 10:35

## 2024-08-21 RX ADMIN — TRAZODONE HYDROCHLORIDE 200 MG: 100 TABLET ORAL at 20:00

## 2024-08-21 RX ADMIN — HYDROCODONE BITARTRATE AND ACETAMINOPHEN 1 TABLET: 5; 325 TABLET ORAL at 11:12

## 2024-08-21 RX ADMIN — FENOFIBRATE 160 MG: 160 TABLET ORAL at 10:35

## 2024-08-21 RX ADMIN — FERROUS SULFATE TAB 325 MG (65 MG ELEMENTAL FE) 325 MG: 325 (65 FE) TAB at 10:36

## 2024-08-21 RX ADMIN — CARVEDILOL 3.12 MG: 3.12 TABLET, FILM COATED ORAL at 20:00

## 2024-08-21 RX ADMIN — BUPROPION HYDROCHLORIDE 300 MG: 300 TABLET, EXTENDED RELEASE ORAL at 10:35

## 2024-08-21 RX ADMIN — INSULIN LISPRO 2 UNITS: 100 INJECTION, SOLUTION INTRAVENOUS; SUBCUTANEOUS at 16:12

## 2024-08-21 RX ADMIN — HYDROCODONE BITARTRATE AND ACETAMINOPHEN 1 TABLET: 5; 325 TABLET ORAL at 18:08

## 2024-08-21 RX ADMIN — FLUTICASONE PROPIONATE 1 SPRAY: 50 SPRAY, METERED NASAL at 10:32

## 2024-08-21 RX ADMIN — PANTOPRAZOLE SODIUM 40 MG: 40 TABLET, DELAYED RELEASE ORAL at 16:09

## 2024-08-21 RX ADMIN — ACETAMINOPHEN 650 MG: 325 TABLET ORAL at 14:30

## 2024-08-21 RX ADMIN — ATORVASTATIN CALCIUM 40 MG: 40 TABLET, FILM COATED ORAL at 20:00

## 2024-08-21 RX ADMIN — QUETIAPINE FUMARATE 350 MG: 400 TABLET ORAL at 20:00

## 2024-08-21 RX ADMIN — PANTOPRAZOLE SODIUM 40 MG: 40 TABLET, DELAYED RELEASE ORAL at 05:05

## 2024-08-21 RX ADMIN — ENOXAPARIN SODIUM 40 MG: 100 INJECTION SUBCUTANEOUS at 10:33

## 2024-08-21 RX ADMIN — SODIUM CHLORIDE, PRESERVATIVE FREE 10 ML: 5 INJECTION INTRAVENOUS at 10:32

## 2024-08-21 RX ADMIN — INSULIN LISPRO 2 UNITS: 100 INJECTION, SOLUTION INTRAVENOUS; SUBCUTANEOUS at 10:48

## 2024-08-21 RX ADMIN — LEVOTHYROXINE SODIUM 75 MCG: 0.07 TABLET ORAL at 05:05

## 2024-08-21 RX ADMIN — CARVEDILOL 3.12 MG: 3.12 TABLET, FILM COATED ORAL at 10:35

## 2024-08-21 RX ADMIN — SODIUM CHLORIDE, PRESERVATIVE FREE 10 ML: 5 INJECTION INTRAVENOUS at 20:00

## 2024-08-21 ASSESSMENT — PAIN SCALES - GENERAL
PAINLEVEL_OUTOF10: 8
PAINLEVEL_OUTOF10: 7
PAINLEVEL_OUTOF10: 9
PAINLEVEL_OUTOF10: 8

## 2024-08-21 ASSESSMENT — PAIN DESCRIPTION - DESCRIPTORS: DESCRIPTORS: ACHING

## 2024-08-21 ASSESSMENT — PAIN DESCRIPTION - ORIENTATION: ORIENTATION: LEFT

## 2024-08-21 ASSESSMENT — PAIN - FUNCTIONAL ASSESSMENT: PAIN_FUNCTIONAL_ASSESSMENT: ACTIVITIES ARE NOT PREVENTED

## 2024-08-21 ASSESSMENT — PAIN DESCRIPTION - LOCATION
LOCATION: FLANK
LOCATION: FLANK

## 2024-08-21 NOTE — PROGRESS NOTES
[facility] [place]  [facility]  Occupational Therapy  Daily Note    Discharge Recommendations: Subacute/skilled nursing facility  Equipment Recommendations: Equipment Needed: No  Other: continue to monitor      Time In: 922  Time Out: 1004  Timed Code Treatment Minutes: 42 Minutes  Minutes: 42          Date: 2024  Patient Name: Elli Coulter,   Gender: female      [facility]  MRN: [id number]  : 1955  (69 y.o.)  Referring Practitioner: Serenity Spears APRN - CNP  Diagnosis: Kindey, perinephric absecess  Additional Pertinent Hx: Per H&P: \"Elli Coulter is a 69 y.o. female with PMHx of Left perinephric abscess, COPD, DM who presented to [facility] with chief complaint of abdominal pain. Patient reports having increased left flank pain that radiates down her right leg over the past few days. Reports some associated nausea but denies emesis. Reports some fever and chills at ECF but does not know how high her temp was. Reports several ED visits and hospitalizations for the same symptoms and issues with her nephrostomy tube in the past. Denies any other symptoms. Denies headache, dizziness, or lightheadedness. Denies chest pain or SOB. Denies abdominal pain. Denies dysuria or hematuria.\"    Restrictions/Precautions:  Restrictions/Precautions: Fall Risk, General Precautions     Social/Functional History:  Lives With: Alone  Type of Home: Assisted living  Home Layout: One level  Home Access: Level entry  Home Equipment: Wheelchair - Manual, Walker - Rolling, Grab bars   Bathroom Shower/Tub: Walk-in shower  Bathroom Toilet: Standard  Bathroom Equipment: Grab bars in shower, Grab bars around toilet, Shower chair       ADL Assistance: Needs assistance (needs assistance to shower and toileting)  Ambulation Assistance: Independent  Transfer Assistance: Independent       Leisure & Hobbies: Play bingo  Additional Comments: used walker to ambulate, had assitance to shower and toileting

## 2024-08-21 NOTE — CARE COORDINATION
8/21/24, 2:47 PM EDT    DISCHARGE PLANNING EVALUATION    PT/OT notes faxed for insurance appeal for snf.

## 2024-08-21 NOTE — PROGRESS NOTES
[facility]  PHYSICAL THERAPY MISSED TREATMENT NOTE  [facility]    Date: 2024  Patient Name: Elli Coulter        MRN: [id number]   : 1955  (69 y.o.)  Gender: female   Referring Practitioner: Serenity Spears APRN - CNP  Diagnosis: Kidney, perinephric abcess         REASON FOR MISSED TREATMENT:  Missed Treat.      Pt declined session due to wanting to eat lunch first, attempted to encourage pt to participate as lunch was not due to come for another 25-30 mins however pt cont to decline.

## 2024-08-21 NOTE — PROGRESS NOTES
Hospitalist Progress Note    Patient:  Elli Coulter      Unit/Bed:7K-09/009-A    YOB: 1955    MRN: [id number]       Acct: [id number]     PCP: Ryan Montalvo MD    Date of Admission: 8/8/2024    Assessment/Plan:    Left side perinephric abscess, recurrent  Urology consulted, saw patient, recommend outpatient f/u at [facility]  Received Zosyn, completed course  Patient is medically stable for d/c to SNF awaiting pre-CERT.  Peer to peer attempted this morning, however patient already denied.  Case discussed with . Number for appeal provided. Contacted appeal number, they want updated therapy notes and they will expedite appeal process.  Reports 72-hour window. Case discussed with , she will work on getting updated therapy notes to [facility].  Fall/aspiration precautions.    Daily weights TRICIA's.  Drain placed by IR 8/9/24 remains in place, plan for possible removal tomorrow  Chest Pain, Unspecified: Patient reports constant chest pain today  EKG and troponin ordered  Consider Echo, murmur noted on exam  Primary hypertension.  On home meds.  Hydralazine as needed ordered additionally.  Deconditioning.  Due to multiple comorbidities.  Requires SNF placement.  PT OT.  COPD.  As needed albuterol.  Monitoring.  Type 2 diabetes mellitus.  Sliding scale insulin increased from medium to high.  POC glucose checks AC plus at bedtime.  Hypoglycemic treatments. Latest POCT glucose reviewed, 265, message received from nursing regarding increasing sliding scale, will increase from Medium dose to high dose.  Hyponatremia.  Resolved.  GERD.  On PPI  Hypothyroidism.  On Synthroid.  Bipolar disorder.  Home meds resumed.  On Lexapro/Wellbutrin, Seroquel  Tobacco smoking.  On nicotine patch.  Educated/counseled regarding quitting from smoking.  Mild anemia.  No signs of acute bleeding.  Noted.  Likely chronic disease.  Will continue monitoring.  Daily CBC.  Hyperlipidemia.  On

## 2024-08-21 NOTE — PLAN OF CARE
Problem: Discharge Planning  Goal: Discharge to home or other facility with appropriate resources  8/20/2024 2205 by Miranda Armas RN  Outcome: Progressing  Flowsheets (Taken 8/20/2024 0906 by Haylee Patel, RN)  Discharge to home or other facility with appropriate resources:   Identify barriers to discharge with patient and caregiver   Identify discharge learning needs (meds, wound care, etc)   Refer to discharge planning if patient needs post-hospital services based on physician order or complex needs related to functional status, cognitive ability or social support system   Arrange for needed discharge resources and transportation as appropriate     Problem: Pain  Goal: Verbalizes/displays adequate comfort level or baseline comfort level  8/20/2024 2205 by Miranda Armas, RN  Outcome: Progressing  Flowsheets (Taken 8/20/2024 0906 by Haylee Patel, RN)  Verbalizes/displays adequate comfort level or baseline comfort level:   Encourage patient to monitor pain and request assistance   Administer analgesics based on type and severity of pain and evaluate response   Consider cultural and social influences on pain and pain management   Assess pain using appropriate pain scale   Implement non-pharmacological measures as appropriate and evaluate response   Notify Licensed Independent Practitioner if interventions unsuccessful or patient reports new pain     Problem: ABCDS Injury Assessment  Goal: Absence of physical injury  8/20/2024 2205 by Miranda Armas RN  Outcome: Progressing  Flowsheets (Taken 8/20/2024 0906 by Haylee Patel, RN)  Absence of Physical Injury: Implement safety measures based on patient assessment     Problem: Safety - Adult  Goal: Free from fall injury  8/20/2024 2205 by Miranda Armas, RN  Outcome: Progressing  Flowsheets (Taken 8/20/2024 0906 by Haylee Patel, RN)  Free From Fall Injury: Instruct family/caregiver on patient safety     Problem: Skin/Tissue

## 2024-08-21 NOTE — CARE COORDINATION
8/21/24, 11:26 AM EDT    DISCHARGE PLANNING EVALUATION    Peer to peer requested for insurance approval for snf

## 2024-08-22 ENCOUNTER — APPOINTMENT (OUTPATIENT)
Dept: CT IMAGING | Age: 69
DRG: 689 | End: 2024-08-22
Payer: MEDICARE

## 2024-08-22 LAB
ANION GAP SERPL CALC-SCNC: 11 MEQ/L (ref 8–16)
BASOPHILS ABSOLUTE: 0.1 THOU/MM3 (ref 0–0.1)
BASOPHILS NFR BLD AUTO: 1 %
BUN SERPL-MCNC: 15 MG/DL (ref 7–22)
CALCIUM SERPL-MCNC: 9.8 MG/DL (ref 8.5–10.5)
CHLORIDE SERPL-SCNC: 101 MEQ/L (ref 98–111)
CO2 SERPL-SCNC: 22 MEQ/L (ref 23–33)
CREAT SERPL-MCNC: 0.6 MG/DL (ref 0.4–1.2)
DEPRECATED RDW RBC AUTO: 44.1 FL (ref 35–45)
EOSINOPHIL NFR BLD AUTO: 1.6 %
EOSINOPHILS ABSOLUTE: 0.1 THOU/MM3 (ref 0–0.4)
ERYTHROCYTE [DISTWIDTH] IN BLOOD BY AUTOMATED COUNT: 14.2 % (ref 11.5–14.5)
GFR SERPL CREATININE-BSD FRML MDRD: > 90 ML/MIN/1.73M2
GLUCOSE BLD STRIP.AUTO-MCNC: 182 MG/DL (ref 70–108)
GLUCOSE BLD STRIP.AUTO-MCNC: 194 MG/DL (ref 70–108)
GLUCOSE BLD STRIP.AUTO-MCNC: 236 MG/DL (ref 70–108)
GLUCOSE BLD STRIP.AUTO-MCNC: 311 MG/DL (ref 70–108)
GLUCOSE SERPL-MCNC: 173 MG/DL (ref 70–108)
HCT VFR BLD AUTO: 35.9 % (ref 37–47)
HGB BLD-MCNC: 11.6 GM/DL (ref 12–16)
IMM GRANULOCYTES # BLD AUTO: 0.03 THOU/MM3 (ref 0–0.07)
IMM GRANULOCYTES NFR BLD AUTO: 0.6 %
LYMPHOCYTES ABSOLUTE: 1.9 THOU/MM3 (ref 1–4.8)
LYMPHOCYTES NFR BLD AUTO: 38 %
MCH RBC QN AUTO: 28.1 PG (ref 26–33)
MCHC RBC AUTO-ENTMCNC: 32.3 GM/DL (ref 32.2–35.5)
MCV RBC AUTO: 86.9 FL (ref 81–99)
MONOCYTES ABSOLUTE: 0.5 THOU/MM3 (ref 0.4–1.3)
MONOCYTES NFR BLD AUTO: 9.3 %
NEUTROPHILS ABSOLUTE: 2.5 THOU/MM3 (ref 1.8–7.7)
NEUTROPHILS NFR BLD AUTO: 49.5 %
NRBC BLD AUTO-RTO: 0 /100 WBC
PLATELET # BLD AUTO: 246 THOU/MM3 (ref 130–400)
PMV BLD AUTO: 9.4 FL (ref 9.4–12.4)
POTASSIUM SERPL-SCNC: 4.2 MEQ/L (ref 3.5–5.2)
RBC # BLD AUTO: 4.13 MILL/MM3 (ref 4.2–5.4)
SODIUM SERPL-SCNC: 134 MEQ/L (ref 135–145)
WBC # BLD AUTO: 5 THOU/MM3 (ref 4.8–10.8)

## 2024-08-22 PROCEDURE — 6370000000 HC RX 637 (ALT 250 FOR IP)

## 2024-08-22 PROCEDURE — 80048 BASIC METABOLIC PNL TOTAL CA: CPT

## 2024-08-22 PROCEDURE — 74177 CT ABD & PELVIS W/CONTRAST: CPT

## 2024-08-22 PROCEDURE — 36415 COLL VENOUS BLD VENIPUNCTURE: CPT

## 2024-08-22 PROCEDURE — 6360000004 HC RX CONTRAST MEDICATION: Performed by: PHYSICIAN ASSISTANT

## 2024-08-22 PROCEDURE — 85025 COMPLETE CBC W/AUTO DIFF WBC: CPT

## 2024-08-22 PROCEDURE — 97530 THERAPEUTIC ACTIVITIES: CPT

## 2024-08-22 PROCEDURE — 6360000002 HC RX W HCPCS

## 2024-08-22 PROCEDURE — 99233 SBSQ HOSP IP/OBS HIGH 50: CPT | Performed by: PHYSICIAN ASSISTANT

## 2024-08-22 PROCEDURE — 2580000003 HC RX 258

## 2024-08-22 PROCEDURE — 6370000000 HC RX 637 (ALT 250 FOR IP): Performed by: NURSE PRACTITIONER

## 2024-08-22 PROCEDURE — 6370000000 HC RX 637 (ALT 250 FOR IP): Performed by: PHYSICIAN ASSISTANT

## 2024-08-22 PROCEDURE — 82948 REAGENT STRIP/BLOOD GLUCOSE: CPT

## 2024-08-22 PROCEDURE — 97110 THERAPEUTIC EXERCISES: CPT

## 2024-08-22 PROCEDURE — 1200000000 HC SEMI PRIVATE

## 2024-08-22 RX ORDER — IOPAMIDOL 755 MG/ML
80 INJECTION, SOLUTION INTRAVASCULAR
Status: COMPLETED | OUTPATIENT
Start: 2024-08-22 | End: 2024-08-22

## 2024-08-22 RX ADMIN — HYDROCODONE BITARTRATE AND ACETAMINOPHEN 1 TABLET: 5; 325 TABLET ORAL at 19:55

## 2024-08-22 RX ADMIN — CARVEDILOL 3.12 MG: 3.12 TABLET, FILM COATED ORAL at 19:55

## 2024-08-22 RX ADMIN — INSULIN LISPRO 4 UNITS: 100 INJECTION, SOLUTION INTRAVENOUS; SUBCUTANEOUS at 17:03

## 2024-08-22 RX ADMIN — LEVOTHYROXINE SODIUM 75 MCG: 0.07 TABLET ORAL at 05:28

## 2024-08-22 RX ADMIN — SODIUM CHLORIDE, PRESERVATIVE FREE 10 ML: 5 INJECTION INTRAVENOUS at 08:09

## 2024-08-22 RX ADMIN — IOPAMIDOL 80 ML: 755 INJECTION, SOLUTION INTRAVENOUS at 15:05

## 2024-08-22 RX ADMIN — QUETIAPINE FUMARATE 350 MG: 400 TABLET ORAL at 19:55

## 2024-08-22 RX ADMIN — FLUTICASONE PROPIONATE 1 SPRAY: 50 SPRAY, METERED NASAL at 08:09

## 2024-08-22 RX ADMIN — FLUTICASONE PROPIONATE 1 SPRAY: 50 SPRAY, METERED NASAL at 19:58

## 2024-08-22 RX ADMIN — SODIUM CHLORIDE, PRESERVATIVE FREE 10 ML: 5 INJECTION INTRAVENOUS at 19:55

## 2024-08-22 RX ADMIN — PANTOPRAZOLE SODIUM 40 MG: 40 TABLET, DELAYED RELEASE ORAL at 14:34

## 2024-08-22 RX ADMIN — ENOXAPARIN SODIUM 40 MG: 100 INJECTION SUBCUTANEOUS at 08:10

## 2024-08-22 RX ADMIN — BUPROPION HYDROCHLORIDE 300 MG: 300 TABLET, EXTENDED RELEASE ORAL at 08:09

## 2024-08-22 RX ADMIN — PANTOPRAZOLE SODIUM 40 MG: 40 TABLET, DELAYED RELEASE ORAL at 05:28

## 2024-08-22 RX ADMIN — ATORVASTATIN CALCIUM 40 MG: 40 TABLET, FILM COATED ORAL at 19:55

## 2024-08-22 RX ADMIN — ESCITALOPRAM OXALATE 20 MG: 20 TABLET ORAL at 08:09

## 2024-08-22 RX ADMIN — CARVEDILOL 3.12 MG: 3.12 TABLET, FILM COATED ORAL at 08:08

## 2024-08-22 RX ADMIN — HYDROCODONE BITARTRATE AND ACETAMINOPHEN 1 TABLET: 5; 325 TABLET ORAL at 04:49

## 2024-08-22 RX ADMIN — HYDROCODONE BITARTRATE AND ACETAMINOPHEN 1 TABLET: 5; 325 TABLET ORAL at 14:36

## 2024-08-22 RX ADMIN — FENOFIBRATE 160 MG: 160 TABLET ORAL at 08:09

## 2024-08-22 RX ADMIN — TRAZODONE HYDROCHLORIDE 200 MG: 100 TABLET ORAL at 19:55

## 2024-08-22 ASSESSMENT — PAIN DESCRIPTION - DESCRIPTORS
DESCRIPTORS: ACHING
DESCRIPTORS: ACHING

## 2024-08-22 ASSESSMENT — PAIN - FUNCTIONAL ASSESSMENT: PAIN_FUNCTIONAL_ASSESSMENT: ACTIVITIES ARE NOT PREVENTED

## 2024-08-22 ASSESSMENT — PAIN SCALES - GENERAL
PAINLEVEL_OUTOF10: 6
PAINLEVEL_OUTOF10: 6
PAINLEVEL_OUTOF10: 10

## 2024-08-22 ASSESSMENT — PAIN DESCRIPTION - ORIENTATION
ORIENTATION: LEFT
ORIENTATION: LEFT

## 2024-08-22 ASSESSMENT — PAIN DESCRIPTION - LOCATION
LOCATION: FLANK
LOCATION: FLANK

## 2024-08-22 NOTE — CARE COORDINATION
8/22/24, 8:37 AM EDT    DISCHARGE PLANNING EVALUATION    Expedited appeal for [facility] in progress.

## 2024-08-22 NOTE — CARE COORDINATION
8/22/24, 8:37 AM EDT    DISCHARGE PLANNING EVALUATION    Expedited appeal in process for snf at [facility], patient is from assisted living at the facility.

## 2024-08-22 NOTE — PLAN OF CARE
Problem: Discharge Planning  Goal: Discharge to home or other facility with appropriate resources  8/21/2024 2156 by Miranda Armas RN  Outcome: Progressing  Flowsheets (Taken 8/20/2024 0906 by Haylee Patel, RN)  Discharge to home or other facility with appropriate resources:   Identify barriers to discharge with patient and caregiver   Identify discharge learning needs (meds, wound care, etc)   Refer to discharge planning if patient needs post-hospital services based on physician order or complex needs related to functional status, cognitive ability or social support system   Arrange for needed discharge resources and transportation as appropriate     Problem: Pain  Goal: Verbalizes/displays adequate comfort level or baseline comfort level  8/21/2024 2156 by Miranda Armas, RN  Outcome: Progressing  Flowsheets (Taken 8/20/2024 0906 by Haylee Patel, RN)  Verbalizes/displays adequate comfort level or baseline comfort level:   Encourage patient to monitor pain and request assistance   Administer analgesics based on type and severity of pain and evaluate response   Consider cultural and social influences on pain and pain management   Assess pain using appropriate pain scale   Implement non-pharmacological measures as appropriate and evaluate response   Notify Licensed Independent Practitioner if interventions unsuccessful or patient reports new pain     Problem: ABCDS Injury Assessment  Goal: Absence of physical injury  8/21/2024 2156 by Miranda Armas RN  Outcome: Progressing  Flowsheets (Taken 8/20/2024 0906 by Haylee Patel, RN)  Absence of Physical Injury: Implement safety measures based on patient assessment     Problem: Safety - Adult  Goal: Free from fall injury  8/21/2024 2156 by Miranda Armas, RN  Outcome: Progressing  Flowsheets (Taken 8/20/2024 0906 by Haylee Patel, RN)  Free From Fall Injury: Instruct family/caregiver on patient safety     Problem: Skin/Tissue

## 2024-08-22 NOTE — PROGRESS NOTES
Hospitalist Progress Note    Patient:  Elli Coulter      Unit/Bed:7K-09/009-A    YOB: 1955    MRN: [id number]       Acct: [id number]     PCP: Ryan Montalvo MD    Date of Admission: 8/8/2024    Assessment/Plan:    Left side perinephric abscess, recurrent  Urology consulted, saw patient, recommend outpatient f/u at [facility]  Received Zosyn, completed course  Patient is medically stable for d/c to SNF awaiting pre-CERT.  Peer to peer attempted morning of 8/20/24, however patient already denied.  Case discussed with . Number for appeal provided. Contacted appeal number, they want updated therapy notes and they will expedite appeal process.  Reports 72-hour window. Case discussed with  8/21/24,updated therapy notes sent. Appeal pending at this time.  Fall/aspiration precautions.    Daily weights TRICIA's.  Drain placed by IR 8/9/24 remains in place IR procedure request made to remove drain, IR contacted  Chest Pain, Unspecified: Patient reports constant chest pain today  EKG and troponin ordered  Consider Echo, murmur noted on exam  Primary hypertension.  On home meds.  Hydralazine as needed ordered additionally.  Deconditioning.  Due to multiple comorbidities.  Requires SNF placement.  PT OT.  COPD.  As needed albuterol.  Monitoring.  Type 2 diabetes mellitus.  Sliding scale insulin increased from medium to high.  POC glucose checks AC plus at bedtime.  Hypoglycemic treatments. Contine high dose sliding scale, latest POCT glucose 194  Hyponatremia.  134, insignificant  GERD.  On PPI  Hypothyroidism.  On Synthroid.  Bipolar disorder.  Home meds resumed.  On Lexapro/Wellbutrin, Seroquel  Tobacco smoking.  On nicotine patch.  Educated/counseled regarding quitting from smoking.  Mild anemia.  Hemoglobin improved to 11.6 8/22/2024  Hyperlipidemia.  On Lipitor/trilidge.    Disposition:    [] Home       [] TCU       [] Rehab       [] Psych       [x] SNF- Await Pre-cert       [] Long

## 2024-08-23 ENCOUNTER — APPOINTMENT (OUTPATIENT)
Dept: INTERVENTIONAL RADIOLOGY/VASCULAR | Age: 69
DRG: 689 | End: 2024-08-23
Payer: MEDICARE

## 2024-08-23 LAB
GLUCOSE BLD STRIP.AUTO-MCNC: 190 MG/DL (ref 70–108)
GLUCOSE BLD STRIP.AUTO-MCNC: 196 MG/DL (ref 70–108)
GLUCOSE BLD STRIP.AUTO-MCNC: 228 MG/DL (ref 70–108)
GLUCOSE BLD STRIP.AUTO-MCNC: 270 MG/DL (ref 70–108)

## 2024-08-23 PROCEDURE — 6360000002 HC RX W HCPCS

## 2024-08-23 PROCEDURE — 6370000000 HC RX 637 (ALT 250 FOR IP)

## 2024-08-23 PROCEDURE — 6370000000 HC RX 637 (ALT 250 FOR IP): Performed by: NURSE PRACTITIONER

## 2024-08-23 PROCEDURE — 1200000000 HC SEMI PRIVATE

## 2024-08-23 PROCEDURE — 97110 THERAPEUTIC EXERCISES: CPT

## 2024-08-23 PROCEDURE — 99232 SBSQ HOSP IP/OBS MODERATE 35: CPT

## 2024-08-23 PROCEDURE — 2580000003 HC RX 258

## 2024-08-23 PROCEDURE — 82948 REAGENT STRIP/BLOOD GLUCOSE: CPT

## 2024-08-23 PROCEDURE — 97530 THERAPEUTIC ACTIVITIES: CPT

## 2024-08-23 PROCEDURE — 6370000000 HC RX 637 (ALT 250 FOR IP): Performed by: PHYSICIAN ASSISTANT

## 2024-08-23 RX ORDER — INSULIN GLARGINE 100 [IU]/ML
14 INJECTION, SOLUTION SUBCUTANEOUS NIGHTLY
Status: DISCONTINUED | OUTPATIENT
Start: 2024-08-23 | End: 2024-08-23

## 2024-08-23 RX ORDER — HYDRALAZINE HYDROCHLORIDE 20 MG/ML
10 INJECTION INTRAMUSCULAR; INTRAVENOUS EVERY 6 HOURS PRN
Status: DISCONTINUED | OUTPATIENT
Start: 2024-08-23 | End: 2024-08-27 | Stop reason: HOSPADM

## 2024-08-23 RX ORDER — INSULIN GLARGINE 100 [IU]/ML
10 INJECTION, SOLUTION SUBCUTANEOUS NIGHTLY
Status: DISCONTINUED | OUTPATIENT
Start: 2024-08-23 | End: 2024-08-24

## 2024-08-23 RX ADMIN — SODIUM CHLORIDE, PRESERVATIVE FREE 10 ML: 5 INJECTION INTRAVENOUS at 20:14

## 2024-08-23 RX ADMIN — ACETAMINOPHEN 650 MG: 325 TABLET ORAL at 20:12

## 2024-08-23 RX ADMIN — SODIUM CHLORIDE, PRESERVATIVE FREE 10 ML: 5 INJECTION INTRAVENOUS at 07:45

## 2024-08-23 RX ADMIN — CARVEDILOL 3.12 MG: 3.12 TABLET, FILM COATED ORAL at 20:13

## 2024-08-23 RX ADMIN — ESCITALOPRAM OXALATE 20 MG: 20 TABLET ORAL at 07:45

## 2024-08-23 RX ADMIN — PANTOPRAZOLE SODIUM 40 MG: 40 TABLET, DELAYED RELEASE ORAL at 06:01

## 2024-08-23 RX ADMIN — PANTOPRAZOLE SODIUM 40 MG: 40 TABLET, DELAYED RELEASE ORAL at 15:47

## 2024-08-23 RX ADMIN — INSULIN LISPRO 4 UNITS: 100 INJECTION, SOLUTION INTRAVENOUS; SUBCUTANEOUS at 12:01

## 2024-08-23 RX ADMIN — HYDROCODONE BITARTRATE AND ACETAMINOPHEN 1 TABLET: 5; 325 TABLET ORAL at 21:41

## 2024-08-23 RX ADMIN — ATORVASTATIN CALCIUM 40 MG: 40 TABLET, FILM COATED ORAL at 20:14

## 2024-08-23 RX ADMIN — TRAZODONE HYDROCHLORIDE 200 MG: 100 TABLET ORAL at 20:12

## 2024-08-23 RX ADMIN — HYDROCODONE BITARTRATE AND ACETAMINOPHEN 1 TABLET: 5; 325 TABLET ORAL at 06:01

## 2024-08-23 RX ADMIN — BUPROPION HYDROCHLORIDE 300 MG: 300 TABLET, EXTENDED RELEASE ORAL at 07:45

## 2024-08-23 RX ADMIN — FERROUS SULFATE TAB 325 MG (65 MG ELEMENTAL FE) 325 MG: 325 (65 FE) TAB at 07:45

## 2024-08-23 RX ADMIN — CARVEDILOL 3.12 MG: 3.12 TABLET, FILM COATED ORAL at 07:45

## 2024-08-23 RX ADMIN — INSULIN GLARGINE 10 UNITS: 100 INJECTION, SOLUTION SUBCUTANEOUS at 20:11

## 2024-08-23 RX ADMIN — QUETIAPINE FUMARATE 350 MG: 400 TABLET ORAL at 20:12

## 2024-08-23 RX ADMIN — HYDROCODONE BITARTRATE AND ACETAMINOPHEN 1 TABLET: 5; 325 TABLET ORAL at 15:31

## 2024-08-23 RX ADMIN — ENOXAPARIN SODIUM 40 MG: 100 INJECTION SUBCUTANEOUS at 07:46

## 2024-08-23 RX ADMIN — LEVOTHYROXINE SODIUM 75 MCG: 0.07 TABLET ORAL at 06:01

## 2024-08-23 RX ADMIN — FENOFIBRATE 160 MG: 160 TABLET ORAL at 07:45

## 2024-08-23 RX ADMIN — FLUTICASONE PROPIONATE 1 SPRAY: 50 SPRAY, METERED NASAL at 07:45

## 2024-08-23 RX ADMIN — HYDRALAZINE HYDROCHLORIDE 10 MG: 20 INJECTION, SOLUTION INTRAMUSCULAR; INTRAVENOUS at 15:47

## 2024-08-23 ASSESSMENT — PAIN SCALES - WONG BAKER: WONGBAKER_NUMERICALRESPONSE: NO HURT

## 2024-08-23 ASSESSMENT — PAIN SCALES - GENERAL
PAINLEVEL_OUTOF10: 7
PAINLEVEL_OUTOF10: 6
PAINLEVEL_OUTOF10: 9
PAINLEVEL_OUTOF10: 7
PAINLEVEL_OUTOF10: 0
PAINLEVEL_OUTOF10: 7
PAINLEVEL_OUTOF10: 6

## 2024-08-23 ASSESSMENT — PAIN DESCRIPTION - ORIENTATION
ORIENTATION: RIGHT;LEFT;LOWER
ORIENTATION: MID

## 2024-08-23 ASSESSMENT — PAIN DESCRIPTION - LOCATION
LOCATION: BACK
LOCATION: BACK

## 2024-08-23 ASSESSMENT — PAIN DESCRIPTION - DESCRIPTORS
DESCRIPTORS: ACHING
DESCRIPTORS: ACHING

## 2024-08-23 NOTE — CARE COORDINATION
8/23/24, 9:16 AM EDT    DISCHARGE PLANNING EVALUATION    Call made to darrel Horner decision on appeal for insurance benefit for skilled care.

## 2024-08-23 NOTE — PROGRESS NOTES
Hospitalist Progress Note    Patient:  Elli Coulter    Unit/Bed:7K-09/009-A  YOB: 1955  MRN: [id number]   Acct: [id number]   PCP: Ryan Montalvo MD  Date of Admission: 8/8/2024    ASSESSMENT AND PLAN  Active Problems  Recurrent left perinephric abscess: Nephrostomy drain placed by IR on admission. Pt remains afebrile, hemodynamically stable, non-toxic appearing.   Culture shows K. Pneumoniae, Proteus mirabilis. Received Zosyn 8/8- 8/15.   CT A/P 8/22 shows significant improvement since 8/8/2024 with almost complete drainage of left perinephric and psoas abscess.  IR consulted to remove drain.   Urology consulted recommending follow up with [facility], as she follows there.   T2DM:  Lantus resumed today at 10 units nightly. Continue SSI. Monitor BMP, POCT glucose checks. Hypoglycemia protocol in place.   Essential HTN: Intermittently accelerated. Monitor closely. On coreg. Will consider adding antihypertensive agent.   PRN hydralazine ordered.   Physical deconditioning: PT/OT, awaiting SNF placement.     Resolved Problems  Chest pain  Acute complicated cystitis with hematuria: Urine culture showed mixed growth. Received Zosyn as above.     Chronic Conditions (reviewed and stable unless otherwise stated):   Chronic hypotonic hyponatremia  COPD: Stable on RA. Continue PRN albuterol.   Hypothyroidism: Continue synthroid  GERD: Continue PPI.   Chronic normocytic anemia:   HLD: continue statin.   Bipolar 1 disorder: Continue lexapro, wellbutrin, seroquel  Tobacco use disorder: nicotine patch      DVT Prophylaxis: [] Lovenox / [x] Heparin / [] SCDs / [] Already on Systemic Anticoagulation / [] None  LDA: []CVC / []PICC / []Midline / []Prater / []Drains / []Mediport / [x]None  Antibiotics: none   Steroids: none  Labs (still needed?): [x]Yes / []No  IVF (still needed?): []Yes / [x]No    Level of care: []Step Down / [x]Med-Surg  Bed Status:  techniques and iterative reconstructions, and/or weight-based dosing when appropriate to reduce radiation to a low as reasonably achievable.      Electronically signed by Aliyah Lala PA-C on 8/23/2024 at 7:07 AM

## 2024-08-23 NOTE — CARE COORDINATION
8/23/24, 2:05 PM EDT    DISCHARGE ON GOING EVALUATION    Elli CHEATHAM [facility] day: 15  Location: [facility]09/009-A Reason for admit: Kidney, perinephric abscess [N15.1]  Perinephric abscess [N15.1]  Psoas abscess (HCC) [K68.12]     Procedures: 8/9 CT guided drain placement by IR   Imaging since last note: na    Barriers to Discharge: plans IR to remove truclose drain today    PCP: Ryan Montalvo MD  Readmission Risk Score: 22.4    Patient Goals/Plan/Treatment Preferences: insurance denied SNF; appeal in process. Poss return to [facility].

## 2024-08-23 NOTE — PROGRESS NOTES
[facility]  [facility]  Occupational Therapy  Daily Note    Discharge Recommendations: ECF with OT  Equipment Recommendations: Equipment Needed: No  Other: continue to monitor       Time In: 1325  Time Out: 1403  Timed Code Treatment Minutes: 38 Minutes  Minutes: 38          Date: 2024  Patient Name: Elli Coulter,   Gender: female      Room: [address] [place] [address]  MRN: [id number]  : 1955  (69 y.o.)  Referring Practitioner: Serenity Spears APRN - CNP  Diagnosis: Kindey, perinephric absecess  Additional Pertinent Hx: Per H&P: \"Elli Coulter is a 69 y.o. female with PMHx of Left perinephric abscess, COPD, DM who presented to [facility] with chief complaint of abdominal pain. Patient reports having increased left flank pain that radiates down her right leg over the past few days. Reports some associated nausea but denies emesis. Reports some fever and chills at ECF but does not know how high her temp was. Reports several ED visits and hospitalizations for the same symptoms and issues with her nephrostomy tube in the past. Denies any other symptoms. Denies headache, dizziness, or lightheadedness. Denies chest pain or SOB. Denies abdominal pain. Denies dysuria or hematuria.\"    Restrictions/Precautions:  Restrictions/Precautions: Fall Risk, General Precautions      Social/Functional History:  Lives With: Alone  Type of Home: Assisted living  Home Layout: One level  Home Access: Level entry  Home Equipment: Wheelchair - Manual, Walker - Rolling, Grab bars   Bathroom Shower/Tub: Walk-in shower  Bathroom Toilet: Standard  Bathroom Equipment: Grab bars in shower, Grab bars around toilet, Shower chair       ADL Assistance: Needs assistance (needs assistance to shower and toileting)  Ambulation Assistance: Independent  Transfer Assistance: Independent       Leisure & Hobbies: Play bingo  Additional Comments: used walker to ambulate, had assitance to shower and toileting prn    SUBJECTIVE: Pt seated in  bed upon arrival, drowsy initially, slow to answer questions/follow commands. Pt agreeable to OT session with encouragement, confusion noted throughout. Pt believed had therapy earlier this date, although no other therapy notes seen/documented.    PAIN: 0/10:     Vitals: Vitals not assessed per clinical judgement, see nursing flowsheet    COGNITION: Slow Processing, Decreased Recall, Impaired Memory, Decreased Problem Solving, and Decreased Safety Awareness  **Pt forgot therapist had just been in room, when this therapist returned after getting her ice chips. Pt stated \"were you the one just in here or was that someone else?\"   **pt oriented to person, place.     ADL:   No ADL's completed this session..  **Pt declined completion of all at this time.    IADL:   Not Tested    BALANCE:  Sitting Balance:  Supervision.    Standing Balance: Contact Guard Assistance.      BED MOBILITY:  Supine to Sit: Minimal Assistance, with head of bed raised, with verbal cues , with increased time for completion ; assist to elevate at shoulders/trunk. Completed X2 trials as pt spontaneously returned self to supine after first trial.   Sit to Supine: Stand By Assistance    Scooting: Stand By Assistance      TRANSFERS:  Sit to Stand:  Contact Guard Assistance, with increased time for completion, cues for hand placement.    Stand to Sit: Stand By Assistance, with increased time for completion, cues for hand placement.      FUNCTIONAL MOBILITY:  Assistive Device: Rolling Walker  Assist Level:  Contact Guard Assistance.   Distance: Within room and short distance outside of room  Slow pace, minimal cues for safety with RW to keep close.  Functional mobility completed to increase overall activity tolerance needed for ADL tasks.       ADDITIONAL ACTIVITIES:  Pt completed BUE minimal resistance theraband exercises while seated in chair. Pt completed 1 set X 10 repetitions in all planes with short rest breaks in between variations. Pt demoes poor  carryover for instructions re: correct technique, hand placement, etc, requiring max cues to correct. Poor attention to task, problems solving, initiation, and sequencing noted during completion of HEP. Exercises completed to increase overall strength/endurance needed for ADLs and transfers.     AM-PAC Inpatient Daily Activity Raw Score: 21  AM-PAC Inpatient ADL T-Scale Score : 44.27  ADL Inpatient CMS 0-100% Score: 32.79    Modified Luckey Scale:  Not Applicable    ASSESSMENT:     Activity Tolerance:  Patient tolerance of  treatment: Fair treatment tolerance, Limited by fatigue, Reduced activity pace, and Need for increased rest breaks       Plan: Times Per Week: 4-5x  Current Treatment Recommendations: Strengthening, Functional mobility training, Endurance training, Safety education & training, Patient/Caregiver education & training, Equipment evaluation, education, & procurement, Self-Care / ADL    Education:  Learners: Patient  Plan of Care, Home Exercise Program, Orientation, and Importance of Increasing Activity    Goals  Short Term Goals  Time Frame for Short Term Goals: by discharge  Short Term Goal 1: Pt will complete LB bathing/dressing with supervision in prep for increased independence with dressing and bathing  Short Term Goal 2: Pt will ambulate HH distances with SBA and 0 verbal cues for safety in prep for safe functional mobility in her home environment.  Short Term Goal 3: Pt will tolerate 12-15 min standing to complete grooming tasks with 1-2 UE release and supervision in prep for increased acitivity tolerance with ADL and IADL task.  Short Term Goal 4: Pt will complete various functional transfers with supervision in prep for safe toilet transfers  Long Term Goals  Time Frame for Long Term Goals : none due to ELOS    Following session, patient left in safe position with all fall risk precautions in place.

## 2024-08-24 LAB
ANION GAP SERPL CALC-SCNC: 12 MEQ/L (ref 8–16)
BASOPHILS ABSOLUTE: 0 THOU/MM3 (ref 0–0.1)
BASOPHILS NFR BLD AUTO: 0.9 %
BUN SERPL-MCNC: 20 MG/DL (ref 7–22)
CALCIUM SERPL-MCNC: 10.7 MG/DL (ref 8.5–10.5)
CHLORIDE SERPL-SCNC: 101 MEQ/L (ref 98–111)
CO2 SERPL-SCNC: 24 MEQ/L (ref 23–33)
CREAT SERPL-MCNC: 0.8 MG/DL (ref 0.4–1.2)
DEPRECATED RDW RBC AUTO: 45.6 FL (ref 35–45)
EOSINOPHIL NFR BLD AUTO: 1.8 %
EOSINOPHILS ABSOLUTE: 0.1 THOU/MM3 (ref 0–0.4)
ERYTHROCYTE [DISTWIDTH] IN BLOOD BY AUTOMATED COUNT: 14.5 % (ref 11.5–14.5)
GFR SERPL CREATININE-BSD FRML MDRD: 81 ML/MIN/1.73M2
GLUCOSE BLD STRIP.AUTO-MCNC: 194 MG/DL (ref 70–108)
GLUCOSE BLD STRIP.AUTO-MCNC: 229 MG/DL (ref 70–108)
GLUCOSE BLD STRIP.AUTO-MCNC: 237 MG/DL (ref 70–108)
GLUCOSE BLD STRIP.AUTO-MCNC: 372 MG/DL (ref 70–108)
GLUCOSE SERPL-MCNC: 193 MG/DL (ref 70–108)
HCT VFR BLD AUTO: 37.5 % (ref 37–47)
HGB BLD-MCNC: 12.1 GM/DL (ref 12–16)
IMM GRANULOCYTES # BLD AUTO: 0.03 THOU/MM3 (ref 0–0.07)
IMM GRANULOCYTES NFR BLD AUTO: 0.6 %
LYMPHOCYTES ABSOLUTE: 2.7 THOU/MM3 (ref 1–4.8)
LYMPHOCYTES NFR BLD AUTO: 49 %
MCH RBC QN AUTO: 28.1 PG (ref 26–33)
MCHC RBC AUTO-ENTMCNC: 32.3 GM/DL (ref 32.2–35.5)
MCV RBC AUTO: 87.2 FL (ref 81–99)
MONOCYTES ABSOLUTE: 0.5 THOU/MM3 (ref 0.4–1.3)
MONOCYTES NFR BLD AUTO: 9 %
NEUTROPHILS ABSOLUTE: 2.1 THOU/MM3 (ref 1.8–7.7)
NEUTROPHILS NFR BLD AUTO: 38.7 %
NRBC BLD AUTO-RTO: 0 /100 WBC
PLATELET # BLD AUTO: 248 THOU/MM3 (ref 130–400)
PMV BLD AUTO: 9.3 FL (ref 9.4–12.4)
POTASSIUM SERPL-SCNC: 4.6 MEQ/L (ref 3.5–5.2)
RBC # BLD AUTO: 4.3 MILL/MM3 (ref 4.2–5.4)
SODIUM SERPL-SCNC: 137 MEQ/L (ref 135–145)
WBC # BLD AUTO: 5.5 THOU/MM3 (ref 4.8–10.8)

## 2024-08-24 PROCEDURE — 2580000003 HC RX 258

## 2024-08-24 PROCEDURE — 6370000000 HC RX 637 (ALT 250 FOR IP)

## 2024-08-24 PROCEDURE — 36415 COLL VENOUS BLD VENIPUNCTURE: CPT

## 2024-08-24 PROCEDURE — 80048 BASIC METABOLIC PNL TOTAL CA: CPT

## 2024-08-24 PROCEDURE — 6370000000 HC RX 637 (ALT 250 FOR IP): Performed by: NURSE PRACTITIONER

## 2024-08-24 PROCEDURE — 82948 REAGENT STRIP/BLOOD GLUCOSE: CPT

## 2024-08-24 PROCEDURE — 6360000002 HC RX W HCPCS

## 2024-08-24 PROCEDURE — 85025 COMPLETE CBC W/AUTO DIFF WBC: CPT

## 2024-08-24 PROCEDURE — 6370000000 HC RX 637 (ALT 250 FOR IP): Performed by: PHYSICIAN ASSISTANT

## 2024-08-24 PROCEDURE — 1200000000 HC SEMI PRIVATE

## 2024-08-24 PROCEDURE — 99231 SBSQ HOSP IP/OBS SF/LOW 25: CPT

## 2024-08-24 RX ORDER — INSULIN GLARGINE 100 [IU]/ML
12 INJECTION, SOLUTION SUBCUTANEOUS NIGHTLY
Status: DISCONTINUED | OUTPATIENT
Start: 2024-08-24 | End: 2024-08-25

## 2024-08-24 RX ADMIN — LEVOTHYROXINE SODIUM 75 MCG: 0.07 TABLET ORAL at 06:12

## 2024-08-24 RX ADMIN — ATORVASTATIN CALCIUM 40 MG: 40 TABLET, FILM COATED ORAL at 19:55

## 2024-08-24 RX ADMIN — HYDROCODONE BITARTRATE AND ACETAMINOPHEN 1 TABLET: 5; 325 TABLET ORAL at 14:44

## 2024-08-24 RX ADMIN — INSULIN GLARGINE 12 UNITS: 100 INJECTION, SOLUTION SUBCUTANEOUS at 19:56

## 2024-08-24 RX ADMIN — HYDROCODONE BITARTRATE AND ACETAMINOPHEN 1 TABLET: 5; 325 TABLET ORAL at 08:04

## 2024-08-24 RX ADMIN — ACETAMINOPHEN 650 MG: 325 TABLET ORAL at 12:36

## 2024-08-24 RX ADMIN — BUPROPION HYDROCHLORIDE 300 MG: 300 TABLET, EXTENDED RELEASE ORAL at 08:04

## 2024-08-24 RX ADMIN — PANTOPRAZOLE SODIUM 40 MG: 40 TABLET, DELAYED RELEASE ORAL at 06:12

## 2024-08-24 RX ADMIN — ESCITALOPRAM OXALATE 20 MG: 20 TABLET ORAL at 08:04

## 2024-08-24 RX ADMIN — SODIUM CHLORIDE, PRESERVATIVE FREE 10 ML: 5 INJECTION INTRAVENOUS at 08:04

## 2024-08-24 RX ADMIN — PANTOPRAZOLE SODIUM 40 MG: 40 TABLET, DELAYED RELEASE ORAL at 14:43

## 2024-08-24 RX ADMIN — HYDROCODONE BITARTRATE AND ACETAMINOPHEN 1 TABLET: 5; 325 TABLET ORAL at 21:33

## 2024-08-24 RX ADMIN — QUETIAPINE FUMARATE 350 MG: 400 TABLET ORAL at 19:55

## 2024-08-24 RX ADMIN — ENOXAPARIN SODIUM 40 MG: 100 INJECTION SUBCUTANEOUS at 09:22

## 2024-08-24 RX ADMIN — FENOFIBRATE 160 MG: 160 TABLET ORAL at 08:03

## 2024-08-24 RX ADMIN — SODIUM CHLORIDE, PRESERVATIVE FREE 10 ML: 5 INJECTION INTRAVENOUS at 19:56

## 2024-08-24 RX ADMIN — TRAZODONE HYDROCHLORIDE 200 MG: 100 TABLET ORAL at 19:57

## 2024-08-24 RX ADMIN — FLUTICASONE PROPIONATE 1 SPRAY: 50 SPRAY, METERED NASAL at 19:57

## 2024-08-24 RX ADMIN — CARVEDILOL 3.12 MG: 3.12 TABLET, FILM COATED ORAL at 19:55

## 2024-08-24 RX ADMIN — INSULIN LISPRO 4 UNITS: 100 INJECTION, SOLUTION INTRAVENOUS; SUBCUTANEOUS at 17:10

## 2024-08-24 RX ADMIN — INSULIN LISPRO 16 UNITS: 100 INJECTION, SOLUTION INTRAVENOUS; SUBCUTANEOUS at 11:57

## 2024-08-24 ASSESSMENT — PAIN DESCRIPTION - ONSET: ONSET: ON-GOING

## 2024-08-24 ASSESSMENT — PAIN DESCRIPTION - LOCATION
LOCATION: BACK

## 2024-08-24 ASSESSMENT — PAIN SCALES - GENERAL
PAINLEVEL_OUTOF10: 6
PAINLEVEL_OUTOF10: 9
PAINLEVEL_OUTOF10: 0
PAINLEVEL_OUTOF10: 3
PAINLEVEL_OUTOF10: 7

## 2024-08-24 ASSESSMENT — PAIN DESCRIPTION - DESCRIPTORS
DESCRIPTORS: ACHING
DESCRIPTORS: ACHING;SORE
DESCRIPTORS: ACHING
DESCRIPTORS: ACHING;SORE

## 2024-08-24 ASSESSMENT — PAIN DESCRIPTION - ORIENTATION
ORIENTATION: LOWER
ORIENTATION: LEFT
ORIENTATION: LOWER
ORIENTATION: LOWER

## 2024-08-24 ASSESSMENT — PAIN DESCRIPTION - FREQUENCY: FREQUENCY: CONTINUOUS

## 2024-08-24 ASSESSMENT — PAIN DESCRIPTION - PAIN TYPE: TYPE: CHRONIC PAIN

## 2024-08-24 NOTE — PROGRESS NOTES
preliminary Culture overgrown by swarming Proteus species. No further evaluation possible. If a true mixed aerobic and anaerobic infection is suspected, then broad spectrum empiric antibiotic therapy is indicated and should include coverage for anaerobic organisms.          Urinalysis:      Lab Results   Component Value Date/Time    NITRU NEGATIVE 08/08/2024 05:45 PM    WBCUA 15-25 08/08/2024 05:45 PM    WBCUA 0-2 04/19/2012 10:10 AM    BACTERIA FEW 08/08/2024 05:45 PM    RBCUA 50-75 08/08/2024 05:45 PM    BLOODU MODERATE 08/08/2024 05:45 PM    GLUCOSEU >= 1000 08/08/2024 05:45 PM       Radiology:  CT ABDOMEN PELVIS W IV CONTRAST Additional Contrast? Radiologist Recommendation   Final Result      1. Significant interval improvement since previous study dated 8/8/2024 with   almost complete drainage of the left perinephric and psoas abscess. Small   residual abnormal density behind the left kidney measuring 2.8 x 1.9 cm in size.   2. 7.9 mm hypodensity in the left lobe of the liver. Mild splenomegaly.   3. Status post cholecystectomy and hysterectomy.   4. Lumbar spondylosis.   5. Atherosclerotic calcification in the abdominal aorta and iliac arteries.   6. Diverticulosis involving the colon..               **This report has been created using voice recognition software. It may contain   minor errors which are inherent in voice recognition technology.**            Electronically signed by Dr. Ryan Stevens      CT DRAINAGE HEMATOMA/SEROMA/FLUID COLLECTION   Final Result   Status post successful abscess drainage procedure..               **This report has been created using voice recognition software.  It may contain   minor errors which are inherent in voice recognition technology.**               Electronically signed by Dr Anshul Pablo      CT ABDOMEN PELVIS W IV CONTRAST Additional Contrast? None   Final Result   Impression:   1. Left perinephric abscess measures 3.2 x 2.3 x 5.4 cm is larger on    todays exam  compared to 07/30/2024 and is having increased mass effect    upon the left kidney.   2. Left psoas/lateral abdominal abscess measures 2.8 x 8.9 x 7.9 cm and is    increased in size compared to prior CT      This document has been electronically signed by: Prerna Mccormick MD on    08/08/2024 08:22 PM      All CTs at this facility use dose modulation techniques and iterative    reconstructions, and/or weight-based dosing   when appropriate to reduce radiation to a low as reasonably achievable.      IR INTERVENTIONAL RADIOLOGY PROCEDURE REQUEST    (Results Pending)     CT DRAINAGE HEMATOMA/SEROMA/FLUID COLLECTION    Result Date: 8/9/2024  CT-GUIDED ABSCESS DRAINAGE PERFORMED BY: Anshul Pablo M.D. DRAINAGE SITE: Left retroperitoneum APPROACH: Posterior left CATHETER: 8 Thai multipurpose drainage catheter. FLUID OBTAINED: Purulent appearing fluid SEDATION: Versed 1 mg; fentanyl 50 mcg, IV; the patient was sedated during this procedure,  and monitored with EKG and pulse ox monitoring devices by a registered nurse. Face-to-face time with patient 10 minutes. PROCEDURE: Signed informed consent was obtained prior to performing this procedure. The patient was placed on the CT scanner and sedated, as indicated above. ALL CT SCANS AT THIS FACILITY use dose modulation, iterative reconstruction, and/or weight-based dosing when appropriate to reduce radiation dose to as low as reasonably achievable. CT images were initially obtained to determine appropriate puncture site. The skin was marked, prepped, and draped in a sterile fashion. Following local anesthesia and utilizing aseptic technique, a needle was successfully passed into the abscess pocket. A small amount of pus was aspirated to confirm appropriate needle position. A guidewire was then passed through the needle followed by insertion of progressively larger dilators up to an 8 Thai size. An 8 Thai multi-side-hole drainage catheter was then passed over the  wire and was coiled within the abscess pocket. The retaining suture was then locked in the standard fashion. Catheter was then hooked to a Jcarlos-Close drainage bag and the catheter was flushed several times with sterile saline. The catheter was stabilized to the skin with a Percu-Stay device. A sample of the pus was sent to laboratory for culture and sensitivity testing.     Status post successful abscess drainage procedure.. **This report has been created using voice recognition software.  It may contain minor errors which are inherent in voice recognition technology.** Electronically signed by Dr Anshul Pablo    CT ABDOMEN PELVIS W IV CONTRAST Additional Contrast? None    Result Date: 8/8/2024  Exam: CT Abdomen and Pelvis with contrast Comparison: 07/30/2024 Clinical history: Left flank pain Findings: Lung bases are clear. Small hiatal hernia Enlarged fatty infiltrated liver. Stable hypodense lesion in the right lobe of the liver likely represents a small cyst Splenomegaly. No acute pancreatitis. Prior cholecystectomy No choledocholithiasis or biliary obstruction. Normal adrenal glands. Right kidney demonstrates normal enhancement. No right renal stones. No right urinary obstruction. Left renal stones. No hydronephrosis. No left ureteral calculi or urinary obstruction Left perinephric abscess measures 3.2 x 2.3 x 5.4 cm. Abscess appears larger on todays exam compared to 07/30/2024 and is having increased mass effect upon the left kidney.. Left psoas/lateral abdominal abscess measures 2.8 x 8.9 x 7.9 cm and is increased in size compared to prior CT Edema seen along the prior nephrostomy tube tract in the posterior subcutaneous fat of the left flank. No abdominal aortic aneurysm. No small bowel obstruction Appendix is normal in caliber No colitis or diverticulitis. Urinary bladder does not demonstrate stones or wall thickening. Prior hysterectomy. No free fluid Bilateral femoral head AVN. Mild multilevel

## 2024-08-25 LAB
ANION GAP SERPL CALC-SCNC: 9 MEQ/L (ref 8–16)
BASOPHILS ABSOLUTE: 0.1 THOU/MM3 (ref 0–0.1)
BASOPHILS NFR BLD AUTO: 1.1 %
BUN SERPL-MCNC: 19 MG/DL (ref 7–22)
CALCIUM SERPL-MCNC: 10.4 MG/DL (ref 8.5–10.5)
CHLORIDE SERPL-SCNC: 100 MEQ/L (ref 98–111)
CO2 SERPL-SCNC: 25 MEQ/L (ref 23–33)
CREAT SERPL-MCNC: 0.8 MG/DL (ref 0.4–1.2)
DEPRECATED RDW RBC AUTO: 44.9 FL (ref 35–45)
EOSINOPHIL NFR BLD AUTO: 1.4 %
EOSINOPHILS ABSOLUTE: 0.1 THOU/MM3 (ref 0–0.4)
ERYTHROCYTE [DISTWIDTH] IN BLOOD BY AUTOMATED COUNT: 14.3 % (ref 11.5–14.5)
GFR SERPL CREATININE-BSD FRML MDRD: 81 ML/MIN/1.73M2
GLUCOSE BLD STRIP.AUTO-MCNC: 228 MG/DL (ref 70–108)
GLUCOSE BLD STRIP.AUTO-MCNC: 241 MG/DL (ref 70–108)
GLUCOSE BLD STRIP.AUTO-MCNC: 280 MG/DL (ref 70–108)
GLUCOSE BLD STRIP.AUTO-MCNC: 298 MG/DL (ref 70–108)
GLUCOSE SERPL-MCNC: 241 MG/DL (ref 70–108)
HCT VFR BLD AUTO: 37.6 % (ref 37–47)
HGB BLD-MCNC: 12.4 GM/DL (ref 12–16)
IMM GRANULOCYTES # BLD AUTO: 0.03 THOU/MM3 (ref 0–0.07)
IMM GRANULOCYTES NFR BLD AUTO: 0.4 %
LYMPHOCYTES ABSOLUTE: 2.5 THOU/MM3 (ref 1–4.8)
LYMPHOCYTES NFR BLD AUTO: 33.2 %
MCH RBC QN AUTO: 28.6 PG (ref 26–33)
MCHC RBC AUTO-ENTMCNC: 33 GM/DL (ref 32.2–35.5)
MCV RBC AUTO: 86.8 FL (ref 81–99)
MONOCYTES ABSOLUTE: 0.4 THOU/MM3 (ref 0.4–1.3)
MONOCYTES NFR BLD AUTO: 5.7 %
NEUTROPHILS ABSOLUTE: 4.4 THOU/MM3 (ref 1.8–7.7)
NEUTROPHILS NFR BLD AUTO: 58.2 %
NRBC BLD AUTO-RTO: 0 /100 WBC
PLATELET # BLD AUTO: 282 THOU/MM3 (ref 130–400)
PMV BLD AUTO: 9.9 FL (ref 9.4–12.4)
POTASSIUM SERPL-SCNC: 4.3 MEQ/L (ref 3.5–5.2)
RBC # BLD AUTO: 4.33 MILL/MM3 (ref 4.2–5.4)
SODIUM SERPL-SCNC: 134 MEQ/L (ref 135–145)
WBC # BLD AUTO: 7.6 THOU/MM3 (ref 4.8–10.8)

## 2024-08-25 PROCEDURE — 36415 COLL VENOUS BLD VENIPUNCTURE: CPT

## 2024-08-25 PROCEDURE — 1200000000 HC SEMI PRIVATE

## 2024-08-25 PROCEDURE — 6360000002 HC RX W HCPCS

## 2024-08-25 PROCEDURE — 6370000000 HC RX 637 (ALT 250 FOR IP)

## 2024-08-25 PROCEDURE — 6370000000 HC RX 637 (ALT 250 FOR IP): Performed by: NURSE PRACTITIONER

## 2024-08-25 PROCEDURE — 85025 COMPLETE CBC W/AUTO DIFF WBC: CPT

## 2024-08-25 PROCEDURE — 2580000003 HC RX 258

## 2024-08-25 PROCEDURE — 80048 BASIC METABOLIC PNL TOTAL CA: CPT

## 2024-08-25 PROCEDURE — 6370000000 HC RX 637 (ALT 250 FOR IP): Performed by: PHYSICIAN ASSISTANT

## 2024-08-25 PROCEDURE — 99231 SBSQ HOSP IP/OBS SF/LOW 25: CPT

## 2024-08-25 PROCEDURE — 82948 REAGENT STRIP/BLOOD GLUCOSE: CPT

## 2024-08-25 RX ORDER — INSULIN GLARGINE 100 [IU]/ML
14 INJECTION, SOLUTION SUBCUTANEOUS NIGHTLY
Status: DISCONTINUED | OUTPATIENT
Start: 2024-08-25 | End: 2024-08-27 | Stop reason: HOSPADM

## 2024-08-25 RX ADMIN — FLUTICASONE PROPIONATE 1 SPRAY: 50 SPRAY, METERED NASAL at 19:58

## 2024-08-25 RX ADMIN — INSULIN LISPRO 8 UNITS: 100 INJECTION, SOLUTION INTRAVENOUS; SUBCUTANEOUS at 11:30

## 2024-08-25 RX ADMIN — ENOXAPARIN SODIUM 40 MG: 100 INJECTION SUBCUTANEOUS at 07:41

## 2024-08-25 RX ADMIN — PANTOPRAZOLE SODIUM 40 MG: 40 TABLET, DELAYED RELEASE ORAL at 16:34

## 2024-08-25 RX ADMIN — CARVEDILOL 3.12 MG: 3.12 TABLET, FILM COATED ORAL at 19:57

## 2024-08-25 RX ADMIN — HYDROCODONE BITARTRATE AND ACETAMINOPHEN 1 TABLET: 5; 325 TABLET ORAL at 21:23

## 2024-08-25 RX ADMIN — BUPROPION HYDROCHLORIDE 300 MG: 300 TABLET, EXTENDED RELEASE ORAL at 07:40

## 2024-08-25 RX ADMIN — INSULIN LISPRO 8 UNITS: 100 INJECTION, SOLUTION INTRAVENOUS; SUBCUTANEOUS at 16:33

## 2024-08-25 RX ADMIN — LEVOTHYROXINE SODIUM 75 MCG: 0.07 TABLET ORAL at 05:28

## 2024-08-25 RX ADMIN — CARVEDILOL 3.12 MG: 3.12 TABLET, FILM COATED ORAL at 07:39

## 2024-08-25 RX ADMIN — PANTOPRAZOLE SODIUM 40 MG: 40 TABLET, DELAYED RELEASE ORAL at 05:28

## 2024-08-25 RX ADMIN — ESCITALOPRAM OXALATE 20 MG: 20 TABLET ORAL at 07:40

## 2024-08-25 RX ADMIN — TRAZODONE HYDROCHLORIDE 200 MG: 100 TABLET ORAL at 19:59

## 2024-08-25 RX ADMIN — HYDROCODONE BITARTRATE AND ACETAMINOPHEN 1 TABLET: 5; 325 TABLET ORAL at 16:34

## 2024-08-25 RX ADMIN — FERROUS SULFATE TAB 325 MG (65 MG ELEMENTAL FE) 325 MG: 325 (65 FE) TAB at 07:39

## 2024-08-25 RX ADMIN — HYDROCODONE BITARTRATE AND ACETAMINOPHEN 1 TABLET: 5; 325 TABLET ORAL at 10:09

## 2024-08-25 RX ADMIN — ATORVASTATIN CALCIUM 40 MG: 40 TABLET, FILM COATED ORAL at 19:57

## 2024-08-25 RX ADMIN — FENOFIBRATE 160 MG: 160 TABLET ORAL at 07:39

## 2024-08-25 RX ADMIN — QUETIAPINE FUMARATE 350 MG: 400 TABLET ORAL at 19:58

## 2024-08-25 RX ADMIN — FLUTICASONE PROPIONATE 1 SPRAY: 50 SPRAY, METERED NASAL at 07:41

## 2024-08-25 RX ADMIN — SODIUM CHLORIDE, PRESERVATIVE FREE 10 ML: 5 INJECTION INTRAVENOUS at 19:58

## 2024-08-25 RX ADMIN — SODIUM CHLORIDE, PRESERVATIVE FREE 10 ML: 5 INJECTION INTRAVENOUS at 07:40

## 2024-08-25 RX ADMIN — INSULIN GLARGINE 14 UNITS: 100 INJECTION, SOLUTION SUBCUTANEOUS at 19:58

## 2024-08-25 ASSESSMENT — PAIN DESCRIPTION - LOCATION
LOCATION: BACK

## 2024-08-25 ASSESSMENT — PAIN DESCRIPTION - DESCRIPTORS
DESCRIPTORS: ACHING
DESCRIPTORS: ACHING;DISCOMFORT;SPASM
DESCRIPTORS: ACHING;DISCOMFORT;SHARP;SORE

## 2024-08-25 ASSESSMENT — PAIN SCALES - GENERAL
PAINLEVEL_OUTOF10: 5
PAINLEVEL_OUTOF10: 7
PAINLEVEL_OUTOF10: 0
PAINLEVEL_OUTOF10: 6

## 2024-08-25 ASSESSMENT — PAIN DESCRIPTION - ORIENTATION
ORIENTATION: LEFT;LOWER
ORIENTATION: LOWER
ORIENTATION: LEFT

## 2024-08-25 ASSESSMENT — PAIN SCALES - WONG BAKER: WONGBAKER_NUMERICALRESPONSE: HURTS LITTLE MORE

## 2024-08-25 NOTE — PLAN OF CARE
Problem: Discharge Planning  Goal: Discharge to home or other facility with appropriate resources  8/25/2024 1001 by Nahed Hernandez RN  Outcome: Progressing     Problem: Pain  Goal: Verbalizes/displays adequate comfort level or baseline comfort level  8/25/2024 1001 by Nahed Hernandez RN  Outcome: Progressing     Problem: ABCDS Injury Assessment  Goal: Absence of physical injury  8/25/2024 1001 by Nahed Hernandez RN  Outcome: Progressing     Problem: Safety - Adult  Goal: Free from fall injury  8/25/2024 1001 by Nahed Hernandez RN  Outcome: Progressing     Problem: Skin/Tissue Integrity  Goal: Absence of new skin breakdown  Description: 1.  Monitor for areas of redness and/or skin breakdown  2.  Assess vascular access sites hourly  3.  Every 4-6 hours minimum:  Change oxygen saturation probe site  4.  Every 4-6 hours:  If on nasal continuous positive airway pressure, respiratory therapy assess nares and determine need for appliance change or resting period.  8/25/2024 1001 by Nahed Hernandez RN  Outcome: Progressing     Problem: Chronic Conditions and Co-morbidities  Goal: Patient's chronic conditions and co-morbidity symptoms are monitored and maintained or improved  8/25/2024 1001 by Nahed Hernandez RN  Outcome: Progressing     Care plan reviewed with patient , patient verbalized understanding of plan of care and contributed to goal setting.

## 2024-08-25 NOTE — PLAN OF CARE
Problem: Discharge Planning  Goal: Discharge to home or other facility with appropriate resources  8/24/2024 2119 by Kylie Valle RN  Outcome: Progressing  Flowsheets (Taken 8/24/2024 2119)  Discharge to home or other facility with appropriate resources:   Identify barriers to discharge with patient and caregiver   Arrange for needed discharge resources and transportation as appropriate   Identify discharge learning needs (meds, wound care, etc)     Problem: Pain  Goal: Verbalizes/displays adequate comfort level or baseline comfort level  8/24/2024 2119 by Kylie Valle RN  Outcome: Progressing  Flowsheets (Taken 8/24/2024 2119)  Verbalizes/displays adequate comfort level or baseline comfort level:   Encourage patient to monitor pain and request assistance   Assess pain using appropriate pain scale   Administer analgesics based on type and severity of pain and evaluate response   Implement non-pharmacological measures as appropriate and evaluate response     Problem: ABCDS Injury Assessment  Goal: Absence of physical injury  8/24/2024 2119 by Kylie Valle RN  Outcome: Progressing  Flowsheets (Taken 8/24/2024 2119)  Absence of Physical Injury: Implement safety measures based on patient assessment     Problem: Safety - Adult  Goal: Free from fall injury  8/24/2024 2119 by Kylie Valle RN  Outcome: Progressing  Flowsheets (Taken 8/24/2024 2119)  Free From Fall Injury: Instruct family/caregiver on patient safety     Problem: Skin/Tissue Integrity  Goal: Absence of new skin breakdown  Description: 1.  Monitor for areas of redness and/or skin breakdown  2.  Assess vascular access sites hourly  3.  Every 4-6 hours minimum:  Change oxygen saturation probe site  4.  Every 4-6 hours:  If on nasal continuous positive airway pressure, respiratory therapy assess nares and determine need for appliance change or resting period.  8/24/2024 2119 by Kylie Valle RN  Outcome: Progressing     Problem: Chronic  Conditions and Co-morbidities  Goal: Patient's chronic conditions and co-morbidity symptoms are monitored and maintained or improved  8/24/2024 2119 by Kylie Valle RN  Outcome: Progressing  Flowsheets (Taken 8/24/2024 2119)  Care Plan - Patient's Chronic Conditions and Co-Morbidity Symptoms are Monitored and Maintained or Improved:   Monitor and assess patient's chronic conditions and comorbid symptoms for stability, deterioration, or improvement   Collaborate with multidisciplinary team to address chronic and comorbid conditions and prevent exacerbation or deterioration   Update acute care plan with appropriate goals if chronic or comorbid symptoms are exacerbated and prevent overall improvement and discharge     Care plan reviewed with patient.  Patient verbalizes understanding of the plan of care and contributes to goal setting.

## 2024-08-25 NOTE — PROGRESS NOTES
Hospitalist Progress Note    Patient:  Elli Coulter    Unit/Bed:7K-09/009-A  YOB: 1955  MRN: [id number]   Acct: [id number]   PCP: Ryan Montalvo MD  Date of Admission: 8/8/2024    ASSESSMENT AND PLAN  Active Problems  Recurrent left perinephric abscess: Nephrostomy drain placed by IR on admission. Pt remains afebrile, hemodynamically stable, non-toxic appearing.   Culture shows K. Pneumoniae, Proteus mirabilis. Received Zosyn 8/8- 8/15.   CT A/P 8/22 shows significant improvement since 8/8/2024 with almost complete drainage of left perinephric and psoas abscess.  IR removed drain 8/23. Dressing appears clean, dry, intact.  Urology consulted recommending follow up with [facility], as she follows there.   Awaiting repeat labs today. Pt having some left flank pain today. Monitor closely. Encourage Ice application. Suspect secondary to increase mobility, muscle soreness from drain removal, also considering re accumulation, however suspect less likely at this time.  T2DM:  Lantus increased today to 14 units nightly. Continue SSI. Monitor BMP, POCT glucose checks. Hypoglycemia protocol in place.   Essential HTN: Intermittently accelerated. Monitor closely. On coreg. Will consider adding antihypertensive agent.   PRN hydralazine ordered.   Physical deconditioning: PT/OT, awaiting SNF placement.     Resolved Problems  Chest pain  Acute complicated cystitis with hematuria: Urine culture showed mixed growth. Received Zosyn as above.     Chronic Conditions (reviewed and stable unless otherwise stated):   Chronic hypotonic hyponatremia  COPD: Stable on RA. Continue PRN albuterol.   Hypothyroidism: Continue synthroid  GERD: Continue PPI.   Chronic normocytic anemia:   HLD: continue statin.   Bipolar 1 disorder: Continue lexapro, wellbutrin, seroquel  Tobacco use disorder: nicotine patch      DVT Prophylaxis: [] Lovenox / [x] Heparin / [] SCDs /  Ryan Stevens      CT DRAINAGE HEMATOMA/SEROMA/FLUID COLLECTION   Final Result   Status post successful abscess drainage procedure..               **This report has been created using voice recognition software.  It may contain   minor errors which are inherent in voice recognition technology.**               Electronically signed by Dr Anshul Pablo      CT ABDOMEN PELVIS W IV CONTRAST Additional Contrast? None   Final Result   Impression:   1. Left perinephric abscess measures 3.2 x 2.3 x 5.4 cm is larger on    todays exam compared to 07/30/2024 and is having increased mass effect    upon the left kidney.   2. Left psoas/lateral abdominal abscess measures 2.8 x 8.9 x 7.9 cm and is    increased in size compared to prior CT      This document has been electronically signed by: Prerna Mccormick MD on    08/08/2024 08:22 PM      All CTs at this facility use dose modulation techniques and iterative    reconstructions, and/or weight-based dosing   when appropriate to reduce radiation to a low as reasonably achievable.      IR INTERVENTIONAL RADIOLOGY PROCEDURE REQUEST    (Results Pending)     CT DRAINAGE HEMATOMA/SEROMA/FLUID COLLECTION    Result Date: 8/9/2024  CT-GUIDED ABSCESS DRAINAGE PERFORMED BY: Anshul Pablo M.D. DRAINAGE SITE: Left retroperitoneum APPROACH: Posterior left CATHETER: 8 Czech multipurpose drainage catheter. FLUID OBTAINED: Purulent appearing fluid SEDATION: Versed 1 mg; fentanyl 50 mcg, IV; the patient was sedated during this procedure,  and monitored with EKG and pulse ox monitoring devices by a registered nurse. Face-to-face time with patient 10 minutes. PROCEDURE: Signed informed consent was obtained prior to performing this procedure. The patient was placed on the CT scanner and sedated, as indicated above. ALL CT SCANS AT THIS FACILITY use dose modulation, iterative reconstruction, and/or weight-based dosing when appropriate to reduce radiation dose to as low as reasonably  achievable. CT images were initially obtained to determine appropriate puncture site. The skin was marked, prepped, and draped in a sterile fashion. Following local anesthesia and utilizing aseptic technique, a needle was successfully passed into the abscess pocket. A small amount of pus was aspirated to confirm appropriate needle position. A guidewire was then passed through the needle followed by insertion of progressively larger dilators up to an 8 Albanian size. An 8 Albanian multi-side-hole drainage catheter was then passed over the wire and was coiled within the abscess pocket. The retaining suture was then locked in the standard fashion. Catheter was then hooked to a Jcarlos-Close drainage bag and the catheter was flushed several times with sterile saline. The catheter was stabilized to the skin with a Percu-Stay device. A sample of the pus was sent to laboratory for culture and sensitivity testing.     Status post successful abscess drainage procedure.. **This report has been created using voice recognition software.  It may contain minor errors which are inherent in voice recognition technology.** Electronically signed by Dr Anshul Pablo    CT ABDOMEN PELVIS W IV CONTRAST Additional Contrast? None    Result Date: 8/8/2024  Exam: CT Abdomen and Pelvis with contrast Comparison: 07/30/2024 Clinical history: Left flank pain Findings: Lung bases are clear. Small hiatal hernia Enlarged fatty infiltrated liver. Stable hypodense lesion in the right lobe of the liver likely represents a small cyst Splenomegaly. No acute pancreatitis. Prior cholecystectomy No choledocholithiasis or biliary obstruction. Normal adrenal glands. Right kidney demonstrates normal enhancement. No right renal stones. No right urinary obstruction. Left renal stones. No hydronephrosis. No left ureteral calculi or urinary obstruction Left perinephric abscess measures 3.2 x 2.3 x 5.4 cm. Abscess appears larger on todays exam compared to 07/30/2024

## 2024-08-26 LAB
GLUCOSE BLD STRIP.AUTO-MCNC: 203 MG/DL (ref 70–108)
GLUCOSE BLD STRIP.AUTO-MCNC: 219 MG/DL (ref 70–108)
GLUCOSE BLD STRIP.AUTO-MCNC: 225 MG/DL (ref 70–108)
GLUCOSE BLD STRIP.AUTO-MCNC: 231 MG/DL (ref 70–108)
GLUCOSE BLD STRIP.AUTO-MCNC: 238 MG/DL (ref 70–108)

## 2024-08-26 PROCEDURE — 82948 REAGENT STRIP/BLOOD GLUCOSE: CPT

## 2024-08-26 PROCEDURE — 6370000000 HC RX 637 (ALT 250 FOR IP)

## 2024-08-26 PROCEDURE — 6370000000 HC RX 637 (ALT 250 FOR IP): Performed by: NURSE PRACTITIONER

## 2024-08-26 PROCEDURE — 6360000002 HC RX W HCPCS

## 2024-08-26 PROCEDURE — 99231 SBSQ HOSP IP/OBS SF/LOW 25: CPT

## 2024-08-26 PROCEDURE — 2580000003 HC RX 258

## 2024-08-26 PROCEDURE — 97110 THERAPEUTIC EXERCISES: CPT

## 2024-08-26 PROCEDURE — 1200000000 HC SEMI PRIVATE

## 2024-08-26 PROCEDURE — 6370000000 HC RX 637 (ALT 250 FOR IP): Performed by: PHYSICIAN ASSISTANT

## 2024-08-26 RX ORDER — INSULIN LISPRO 100 [IU]/ML
5 INJECTION, SOLUTION INTRAVENOUS; SUBCUTANEOUS
Status: DISCONTINUED | OUTPATIENT
Start: 2024-08-26 | End: 2024-08-27 | Stop reason: HOSPADM

## 2024-08-26 RX ADMIN — ATORVASTATIN CALCIUM 40 MG: 40 TABLET, FILM COATED ORAL at 20:01

## 2024-08-26 RX ADMIN — HYDROCODONE BITARTRATE AND ACETAMINOPHEN 1 TABLET: 5; 325 TABLET ORAL at 13:11

## 2024-08-26 RX ADMIN — SODIUM CHLORIDE, PRESERVATIVE FREE 10 ML: 5 INJECTION INTRAVENOUS at 20:03

## 2024-08-26 RX ADMIN — PANTOPRAZOLE SODIUM 40 MG: 40 TABLET, DELAYED RELEASE ORAL at 05:45

## 2024-08-26 RX ADMIN — INSULIN LISPRO 4 UNITS: 100 INJECTION, SOLUTION INTRAVENOUS; SUBCUTANEOUS at 17:04

## 2024-08-26 RX ADMIN — ACETAMINOPHEN 650 MG: 325 TABLET ORAL at 08:30

## 2024-08-26 RX ADMIN — CARVEDILOL 3.12 MG: 3.12 TABLET, FILM COATED ORAL at 08:30

## 2024-08-26 RX ADMIN — PANTOPRAZOLE SODIUM 40 MG: 40 TABLET, DELAYED RELEASE ORAL at 17:04

## 2024-08-26 RX ADMIN — CARVEDILOL 3.12 MG: 3.12 TABLET, FILM COATED ORAL at 20:01

## 2024-08-26 RX ADMIN — QUETIAPINE FUMARATE 350 MG: 400 TABLET ORAL at 20:01

## 2024-08-26 RX ADMIN — LEVOTHYROXINE SODIUM 75 MCG: 0.07 TABLET ORAL at 05:45

## 2024-08-26 RX ADMIN — INSULIN GLARGINE 14 UNITS: 100 INJECTION, SOLUTION SUBCUTANEOUS at 20:09

## 2024-08-26 RX ADMIN — SODIUM CHLORIDE, PRESERVATIVE FREE 10 ML: 5 INJECTION INTRAVENOUS at 08:29

## 2024-08-26 RX ADMIN — FENOFIBRATE 160 MG: 160 TABLET ORAL at 08:29

## 2024-08-26 RX ADMIN — FLUTICASONE PROPIONATE 1 SPRAY: 50 SPRAY, METERED NASAL at 20:05

## 2024-08-26 RX ADMIN — INSULIN LISPRO 4 UNITS: 100 INJECTION, SOLUTION INTRAVENOUS; SUBCUTANEOUS at 13:11

## 2024-08-26 RX ADMIN — FLUTICASONE PROPIONATE 1 SPRAY: 50 SPRAY, METERED NASAL at 08:30

## 2024-08-26 RX ADMIN — ESCITALOPRAM OXALATE 20 MG: 20 TABLET ORAL at 08:30

## 2024-08-26 RX ADMIN — BUPROPION HYDROCHLORIDE 300 MG: 300 TABLET, EXTENDED RELEASE ORAL at 08:30

## 2024-08-26 RX ADMIN — TRAZODONE HYDROCHLORIDE 200 MG: 100 TABLET ORAL at 20:00

## 2024-08-26 RX ADMIN — HYDROCODONE BITARTRATE AND ACETAMINOPHEN 1 TABLET: 5; 325 TABLET ORAL at 20:01

## 2024-08-26 RX ADMIN — ENOXAPARIN SODIUM 40 MG: 100 INJECTION SUBCUTANEOUS at 08:29

## 2024-08-26 RX ADMIN — ACETAMINOPHEN 650 MG: 325 TABLET ORAL at 17:04

## 2024-08-26 ASSESSMENT — PAIN SCALES - WONG BAKER
WONGBAKER_NUMERICALRESPONSE: NO HURT
WONGBAKER_NUMERICALRESPONSE: HURTS A LITTLE BIT

## 2024-08-26 ASSESSMENT — PAIN SCALES - GENERAL
PAINLEVEL_OUTOF10: 6
PAINLEVEL_OUTOF10: 9
PAINLEVEL_OUTOF10: 4
PAINLEVEL_OUTOF10: 7
PAINLEVEL_OUTOF10: 6
PAINLEVEL_OUTOF10: 0
PAINLEVEL_OUTOF10: 6

## 2024-08-26 ASSESSMENT — PAIN DESCRIPTION - LOCATION: LOCATION: BACK

## 2024-08-26 NOTE — PROGRESS NOTES
Hospitalist Progress Note    Patient:  Elli Coulter    Unit/Bed:7K-09/009-A  YOB: 1955  MRN: [id number]   Acct: [id number]   PCP: Ryan Montalvo MD  Date of Admission: 8/8/2024    ASSESSMENT AND PLAN  Active Problems  Recurrent left perinephric abscess: Nephrostomy drain placed by IR on admission. Pt remains afebrile, hemodynamically stable, non-toxic appearing.   Culture shows K. Pneumoniae, Proteus mirabilis. Received Zosyn 8/8- 8/15.   CT A/P 8/22 shows significant improvement since 8/8/2024 with almost complete drainage of left perinephric and psoas abscess.  IR removed drain 8/23. Dressing appears clean, dry, intact.  Urology consulted recommending follow up with [facility], as she follows there.   WBC stable.  Continue PRN analgesics.  T2DM:  Continue Lantus 14 units night. Add 5 units humalog TID WC. Continue SSI. Monitor BMP, POCT glucose checks. Hypoglycemia protocol in place.   Essential HTN: Stable. Intermittently accelerated. Monitor closely. On coreg. Will consider adding antihypertensive agent.   PRN hydralazine ordered.   Physical deconditioning: PT/OT, awaiting SNF placement.     Resolved Problems  Chest pain  Acute complicated cystitis with hematuria: Urine culture showed mixed growth. Received Zosyn as above.     Chronic Conditions (reviewed and stable unless otherwise stated):   Chronic hypotonic hyponatremia  COPD: Stable on RA. Continue PRN albuterol.   Hypothyroidism: Continue synthroid  GERD: Continue PPI.   Chronic normocytic anemia:   HLD: continue statin.   Bipolar 1 disorder: Continue lexapro, wellbutrin, seroquel  Tobacco use disorder: nicotine patch      DVT Prophylaxis: [] Lovenox / [x] Heparin / [] SCDs / [] Already on Systemic Anticoagulation / [] None  LDA: []CVC / []PICC / []Midline / []Prater / []Drains / []Mediport / [x]None  Antibiotics: none   Steroids: none  Labs (still needed?): [x]Yes /  and/or weight-based dosing when appropriate to reduce radiation dose to as low as reasonably achievable. CT images were initially obtained to determine appropriate puncture site. The skin was marked, prepped, and draped in a sterile fashion. Following local anesthesia and utilizing aseptic technique, a needle was successfully passed into the abscess pocket. A small amount of pus was aspirated to confirm appropriate needle position. A guidewire was then passed through the needle followed by insertion of progressively larger dilators up to an 8 Kenyan size. An 8 Kenyan multi-side-hole drainage catheter was then passed over the wire and was coiled within the abscess pocket. The retaining suture was then locked in the standard fashion. Catheter was then hooked to a Jcarlos-Close drainage bag and the catheter was flushed several times with sterile saline. The catheter was stabilized to the skin with a Percu-Stay device. A sample of the pus was sent to laboratory for culture and sensitivity testing.     Status post successful abscess drainage procedure.. **This report has been created using voice recognition software.  It may contain minor errors which are inherent in voice recognition technology.** Electronically signed by Dr Anshul Pablo    CT ABDOMEN PELVIS W IV CONTRAST Additional Contrast? None    Result Date: 8/8/2024  Exam: CT Abdomen and Pelvis with contrast Comparison: 07/30/2024 Clinical history: Left flank pain Findings: Lung bases are clear. Small hiatal hernia Enlarged fatty infiltrated liver. Stable hypodense lesion in the right lobe of the liver likely represents a small cyst Splenomegaly. No acute pancreatitis. Prior cholecystectomy No choledocholithiasis or biliary obstruction. Normal adrenal glands. Right kidney demonstrates normal enhancement. No right renal stones. No right urinary obstruction. Left renal stones. No hydronephrosis. No left ureteral calculi or urinary obstruction Left perinephric  abscess measures 3.2 x 2.3 x 5.4 cm. Abscess appears larger on todays exam compared to 07/30/2024 and is having increased mass effect upon the left kidney.. Left psoas/lateral abdominal abscess measures 2.8 x 8.9 x 7.9 cm and is increased in size compared to prior CT Edema seen along the prior nephrostomy tube tract in the posterior subcutaneous fat of the left flank. No abdominal aortic aneurysm. No small bowel obstruction Appendix is normal in caliber No colitis or diverticulitis. Urinary bladder does not demonstrate stones or wall thickening. Prior hysterectomy. No free fluid Bilateral femoral head AVN. Mild multilevel degenerative disease of the lumbar spine.     Impression: 1. Left perinephric abscess measures 3.2 x 2.3 x 5.4 cm is larger on todays exam compared to 07/30/2024 and is having increased mass effect upon the left kidney. 2. Left psoas/lateral abdominal abscess measures 2.8 x 8.9 x 7.9 cm and is increased in size compared to prior CT This document has been electronically signed by: Prerna Mccormick MD on 08/08/2024 08:22 PM All CTs at this facility use dose modulation techniques and iterative reconstructions, and/or weight-based dosing when appropriate to reduce radiation to a low as reasonably achievable.      Electronically signed by Aliyah Lala PA-C on 8/26/2024 at 5:01 PM

## 2024-08-26 NOTE — PROGRESS NOTES
[facility]  OCCUPATIONAL THERAPY MISSED TREATMENT NOTE  [facility]  7K-09/009-A      Date: 2024  Patient Name: Elli Coulter        CSN: [id number]   : 1955  (69 y.o.)  Gender: female   Referring Practitioner: Serenity Spears APRN - CNP  Diagnosis: Kindey, perinephric absecess         REASON FOR MISSED TREATMENT: Patient Refused.  Pt reports not feeling well and sleeping in bed. Therapist educated pt on importance of movement and pt continued to decline reporting she just wants to sleep. Will try back at later time.

## 2024-08-26 NOTE — PLAN OF CARE
Problem: Discharge Planning  Goal: Discharge to home or other facility with appropriate resources  Outcome: Progressing  Flowsheets (Taken 8/26/2024 0815)  Discharge to home or other facility with appropriate resources:   Identify barriers to discharge with patient and caregiver   Arrange for needed discharge resources and transportation as appropriate   Identify discharge learning needs (meds, wound care, etc)   Refer to discharge planning if patient needs post-hospital services based on physician order or complex needs related to functional status, cognitive ability or social support system     Problem: Pain  Goal: Verbalizes/displays adequate comfort level or baseline comfort level  Outcome: Progressing     Problem: ABCDS Injury Assessment  Goal: Absence of physical injury  Outcome: Progressing  Flowsheets (Taken 8/26/2024 0819)  Absence of Physical Injury: Implement safety measures based on patient assessment     Problem: Safety - Adult  Goal: Free from fall injury  Outcome: Progressing  Flowsheets (Taken 8/26/2024 0819)  Free From Fall Injury: Instruct family/caregiver on patient safety     Problem: Skin/Tissue Integrity  Goal: Absence of new skin breakdown  Description: 1.  Monitor for areas of redness and/or skin breakdown  2.  Assess vascular access sites hourly  3.  Every 4-6 hours minimum:  Change oxygen saturation probe site  4.  Every 4-6 hours:  If on nasal continuous positive airway pressure, respiratory therapy assess nares and determine need for appliance change or resting period.  Outcome: Progressing     Problem: Chronic Conditions and Co-morbidities  Goal: Patient's chronic conditions and co-morbidity symptoms are monitored and maintained or improved  Outcome: Progressing  Flowsheets (Taken 8/26/2024 0815)  Care Plan - Patient's Chronic Conditions and Co-Morbidity Symptoms are Monitored and Maintained or Improved: Monitor and assess patient's chronic conditions and comorbid symptoms for

## 2024-08-26 NOTE — CARE COORDINATION
8/26/24, 12:05 PM EDT    DISCHARGE PLANNING EVALUATION    Siri at discharge if expedited appeal is approved.

## 2024-08-26 NOTE — PROGRESS NOTES
[facility]  INPATIENT PHYSICAL THERAPY  DAILY NOTE  STR ORTHOPEDICS 7K - 7K-09/009-A      Discharge Recommendations: Continue to assess pending progress and Subacte/Skilled Nursing Facility  Equipment Recommendations:Equipment Needed: No       Time In: 1557  Time Out: 1610  Timed Code Treatment Minutes: 13 Minutes  Minutes: 13          Date: 2024  Patient Name: Elli Coulter,  Gender:  female        MRN: [id number]  : 1955  (69 y.o.)     Referring Practitioner: Serenity Spears APRN - CNP  Diagnosis: Kidney, perinephric abcess  Additional Pertinent Hx: Per H&P: \"Elli Coulter is a 69 y.o. female with PMHx of Left perinephric abscess, COPD, DM who presented to [facility] with chief complaint of abdominal pain. Patient reports having increased left flank pain that radiates down her right leg over the past few days. Reports some associated nausea but denies emesis. Reports some fever and chills at ECF but does not know how high her temp was. Reports several ED visits and hospitalizations for the same symptoms and issues with her nephrostomy tube in the past. Denies any other symptoms. Denies headache, dizziness, or lightheadedness. Denies chest pain or SOB. Denies abdominal pain. Denies dysuria or hematuria.\"     Prior Level of Function:  Lives With: Alone  Type of Home: Assisted living  Home Layout: One level  Home Access: Level entry  Home Equipment: Wheelchair - Manual, Walker - Rolling, Grab bars   Bathroom Shower/Tub: Walk-in shower  Bathroom Toilet: Standard  Bathroom Equipment: Grab bars in shower, Grab bars around toilet, Shower chair    ADL Assistance: Needs assistance (needs assistance to shower and toileting)  Ambulation Assistance: Independent  Transfer Assistance: Independent  Additional Comments: used walker to ambulate, had assitance to shower and toileting prn    Restrictions/Precautions:  Restrictions/Precautions: Fall Risk, General Precautions     SUBJECTIVE: Pt resting in

## 2024-08-26 NOTE — PLAN OF CARE
Problem: Discharge Planning  Goal: Discharge to home or other facility with appropriate resources  8/25/2024 2230 by Kylie Valle RN  Outcome: Progressing  Flowsheets (Taken 8/25/2024 2230)  Discharge to home or other facility with appropriate resources:   Identify barriers to discharge with patient and caregiver   Arrange for needed discharge resources and transportation as appropriate   Identify discharge learning needs (meds, wound care, etc)     Problem: Pain  Goal: Verbalizes/displays adequate comfort level or baseline comfort level  8/25/2024 2230 by Kylie Valle RN  Outcome: Progressing  Flowsheets (Taken 8/25/2024 2230)  Verbalizes/displays adequate comfort level or baseline comfort level:   Encourage patient to monitor pain and request assistance   Assess pain using appropriate pain scale   Administer analgesics based on type and severity of pain and evaluate response   Implement non-pharmacological measures as appropriate and evaluate response     Problem: ABCDS Injury Assessment  Goal: Absence of physical injury  8/25/2024 2230 by Kylie Valle RN  Outcome: Progressing  Flowsheets (Taken 8/25/2024 2230)  Absence of Physical Injury: Implement safety measures based on patient assessment     Problem: Safety - Adult  Goal: Free from fall injury  8/25/2024 2230 by Kylie Valle RN  Outcome: Progressing  Flowsheets (Taken 8/25/2024 2230)  Free From Fall Injury: Instruct family/caregiver on patient safety     Problem: Skin/Tissue Integrity  Goal: Absence of new skin breakdown  Description: 1.  Monitor for areas of redness and/or skin breakdown  2.  Assess vascular access sites hourly  3.  Every 4-6 hours minimum:  Change oxygen saturation probe site  4.  Every 4-6 hours:  If on nasal continuous positive airway pressure, respiratory therapy assess nares and determine need for appliance change or resting period.  8/25/2024 2230 by Kylie Valle RN  Outcome: Progressing     Problem: Chronic

## 2024-08-27 VITALS
RESPIRATION RATE: 16 BRPM | TEMPERATURE: 98.5 F | OXYGEN SATURATION: 98 % | HEART RATE: 70 BPM | BODY MASS INDEX: 28.53 KG/M2 | DIASTOLIC BLOOD PRESSURE: 63 MMHG | SYSTOLIC BLOOD PRESSURE: 145 MMHG | WEIGHT: 167.11 LBS | HEIGHT: 64 IN

## 2024-08-27 LAB
ANION GAP SERPL CALC-SCNC: 9 MEQ/L (ref 8–16)
BASOPHILS ABSOLUTE: 0.1 THOU/MM3 (ref 0–0.1)
BASOPHILS NFR BLD AUTO: 0.7 %
BUN SERPL-MCNC: 19 MG/DL (ref 7–22)
CALCIUM SERPL-MCNC: 10 MG/DL (ref 8.5–10.5)
CHLORIDE SERPL-SCNC: 99 MEQ/L (ref 98–111)
CO2 SERPL-SCNC: 23 MEQ/L (ref 23–33)
CREAT SERPL-MCNC: 0.8 MG/DL (ref 0.4–1.2)
DEPRECATED RDW RBC AUTO: 46.4 FL (ref 35–45)
EOSINOPHIL NFR BLD AUTO: 0.8 %
EOSINOPHILS ABSOLUTE: 0.1 THOU/MM3 (ref 0–0.4)
ERYTHROCYTE [DISTWIDTH] IN BLOOD BY AUTOMATED COUNT: 14.4 % (ref 11.5–14.5)
GFR SERPL CREATININE-BSD FRML MDRD: 81 ML/MIN/1.73M2
GLUCOSE BLD STRIP.AUTO-MCNC: 171 MG/DL (ref 70–108)
GLUCOSE BLD STRIP.AUTO-MCNC: 179 MG/DL (ref 70–108)
GLUCOSE BLD STRIP.AUTO-MCNC: 186 MG/DL (ref 70–108)
GLUCOSE BLD STRIP.AUTO-MCNC: 205 MG/DL (ref 70–108)
GLUCOSE SERPL-MCNC: 224 MG/DL (ref 70–108)
HCT VFR BLD AUTO: 36.9 % (ref 37–47)
HGB BLD-MCNC: 11.8 GM/DL (ref 12–16)
IMM GRANULOCYTES # BLD AUTO: 0.03 THOU/MM3 (ref 0–0.07)
IMM GRANULOCYTES NFR BLD AUTO: 0.3 %
LYMPHOCYTES ABSOLUTE: 2.4 THOU/MM3 (ref 1–4.8)
LYMPHOCYTES NFR BLD AUTO: 24.8 %
MCH RBC QN AUTO: 28.1 PG (ref 26–33)
MCHC RBC AUTO-ENTMCNC: 32 GM/DL (ref 32.2–35.5)
MCV RBC AUTO: 87.9 FL (ref 81–99)
MONOCYTES ABSOLUTE: 0.7 THOU/MM3 (ref 0.4–1.3)
MONOCYTES NFR BLD AUTO: 7 %
NEUTROPHILS ABSOLUTE: 6.3 THOU/MM3 (ref 1.8–7.7)
NEUTROPHILS NFR BLD AUTO: 66.4 %
NRBC BLD AUTO-RTO: 0 /100 WBC
PLATELET # BLD AUTO: 262 THOU/MM3 (ref 130–400)
PMV BLD AUTO: 9.9 FL (ref 9.4–12.4)
POTASSIUM SERPL-SCNC: 4.2 MEQ/L (ref 3.5–5.2)
RBC # BLD AUTO: 4.2 MILL/MM3 (ref 4.2–5.4)
SODIUM SERPL-SCNC: 131 MEQ/L (ref 135–145)
WBC # BLD AUTO: 9.5 THOU/MM3 (ref 4.8–10.8)

## 2024-08-27 PROCEDURE — 82948 REAGENT STRIP/BLOOD GLUCOSE: CPT

## 2024-08-27 PROCEDURE — 6370000000 HC RX 637 (ALT 250 FOR IP)

## 2024-08-27 PROCEDURE — 85025 COMPLETE CBC W/AUTO DIFF WBC: CPT

## 2024-08-27 PROCEDURE — 2580000003 HC RX 258

## 2024-08-27 PROCEDURE — 6370000000 HC RX 637 (ALT 250 FOR IP): Performed by: PHYSICIAN ASSISTANT

## 2024-08-27 PROCEDURE — 97530 THERAPEUTIC ACTIVITIES: CPT

## 2024-08-27 PROCEDURE — 6370000000 HC RX 637 (ALT 250 FOR IP): Performed by: NURSE PRACTITIONER

## 2024-08-27 PROCEDURE — 80048 BASIC METABOLIC PNL TOTAL CA: CPT

## 2024-08-27 PROCEDURE — 97535 SELF CARE MNGMENT TRAINING: CPT

## 2024-08-27 PROCEDURE — 36415 COLL VENOUS BLD VENIPUNCTURE: CPT

## 2024-08-27 PROCEDURE — 6360000002 HC RX W HCPCS

## 2024-08-27 RX ORDER — BISACODYL 5 MG/1
10 TABLET, DELAYED RELEASE ORAL DAILY
Status: DISCONTINUED | OUTPATIENT
Start: 2024-08-27 | End: 2024-08-27 | Stop reason: HOSPADM

## 2024-08-27 RX ORDER — SENNOSIDES A AND B 8.6 MG/1
2 TABLET, FILM COATED ORAL NIGHTLY
Status: DISCONTINUED | OUTPATIENT
Start: 2024-08-27 | End: 2024-08-27 | Stop reason: HOSPADM

## 2024-08-27 RX ORDER — SENNOSIDES A AND B 8.6 MG/1
2 TABLET, FILM COATED ORAL NIGHTLY
Qty: 60 TABLET | Refills: 0 | Status: SHIPPED | OUTPATIENT
Start: 2024-08-27 | End: 2024-09-26

## 2024-08-27 RX ADMIN — PANTOPRAZOLE SODIUM 40 MG: 40 TABLET, DELAYED RELEASE ORAL at 15:58

## 2024-08-27 RX ADMIN — CARVEDILOL 3.12 MG: 3.12 TABLET, FILM COATED ORAL at 08:59

## 2024-08-27 RX ADMIN — INSULIN LISPRO 5 UNITS: 100 INJECTION, SOLUTION INTRAVENOUS; SUBCUTANEOUS at 15:58

## 2024-08-27 RX ADMIN — ESCITALOPRAM OXALATE 20 MG: 20 TABLET ORAL at 08:59

## 2024-08-27 RX ADMIN — SODIUM CHLORIDE, PRESERVATIVE FREE 10 ML: 5 INJECTION INTRAVENOUS at 09:01

## 2024-08-27 RX ADMIN — PANTOPRAZOLE SODIUM 40 MG: 40 TABLET, DELAYED RELEASE ORAL at 05:57

## 2024-08-27 RX ADMIN — LEVOTHYROXINE SODIUM 75 MCG: 0.07 TABLET ORAL at 05:57

## 2024-08-27 RX ADMIN — INSULIN LISPRO 5 UNITS: 100 INJECTION, SOLUTION INTRAVENOUS; SUBCUTANEOUS at 11:30

## 2024-08-27 RX ADMIN — BUPROPION HYDROCHLORIDE 300 MG: 300 TABLET, EXTENDED RELEASE ORAL at 08:59

## 2024-08-27 RX ADMIN — HYDROCODONE BITARTRATE AND ACETAMINOPHEN 1 TABLET: 5; 325 TABLET ORAL at 13:00

## 2024-08-27 RX ADMIN — FLUTICASONE PROPIONATE 1 SPRAY: 50 SPRAY, METERED NASAL at 09:00

## 2024-08-27 RX ADMIN — ENOXAPARIN SODIUM 40 MG: 100 INJECTION SUBCUTANEOUS at 08:58

## 2024-08-27 RX ADMIN — INSULIN LISPRO 5 UNITS: 100 INJECTION, SOLUTION INTRAVENOUS; SUBCUTANEOUS at 09:00

## 2024-08-27 RX ADMIN — INSULIN LISPRO 4 UNITS: 100 INJECTION, SOLUTION INTRAVENOUS; SUBCUTANEOUS at 15:58

## 2024-08-27 RX ADMIN — FERROUS SULFATE TAB 325 MG (65 MG ELEMENTAL FE) 325 MG: 325 (65 FE) TAB at 09:02

## 2024-08-27 RX ADMIN — HYDROCODONE BITARTRATE AND ACETAMINOPHEN 1 TABLET: 5; 325 TABLET ORAL at 05:57

## 2024-08-27 RX ADMIN — FENOFIBRATE 160 MG: 160 TABLET ORAL at 08:59

## 2024-08-27 RX ADMIN — BISACODYL 10 MG: 5 TABLET, COATED ORAL at 11:30

## 2024-08-27 ASSESSMENT — PAIN DESCRIPTION - DESCRIPTORS
DESCRIPTORS: ACHING

## 2024-08-27 ASSESSMENT — PAIN SCALES - GENERAL
PAINLEVEL_OUTOF10: 6
PAINLEVEL_OUTOF10: 5
PAINLEVEL_OUTOF10: 7

## 2024-08-27 ASSESSMENT — PAIN DESCRIPTION - LOCATION
LOCATION: BACK

## 2024-08-27 ASSESSMENT — PAIN DESCRIPTION - ORIENTATION
ORIENTATION: LEFT;MID;LOWER
ORIENTATION: LEFT
ORIENTATION: LEFT

## 2024-08-27 ASSESSMENT — PAIN - FUNCTIONAL ASSESSMENT
PAIN_FUNCTIONAL_ASSESSMENT: ACTIVITIES ARE NOT PREVENTED

## 2024-08-27 ASSESSMENT — PAIN DESCRIPTION - FREQUENCY
FREQUENCY: CONTINUOUS
FREQUENCY: CONTINUOUS

## 2024-08-27 ASSESSMENT — PAIN DESCRIPTION - PAIN TYPE
TYPE: ACUTE PAIN;SURGICAL PAIN
TYPE: ACUTE PAIN;SURGICAL PAIN

## 2024-08-27 ASSESSMENT — PAIN SCALES - WONG BAKER: WONGBAKER_NUMERICALRESPONSE: HURTS A LITTLE BIT

## 2024-08-27 ASSESSMENT — PAIN DESCRIPTION - ONSET
ONSET: ON-GOING
ONSET: ON-GOING

## 2024-08-27 NOTE — PROGRESS NOTES
[facility]  PHYSICAL THERAPY MISSED TREATMENT NOTE  STRZ ORTHOPEDICS 7K    Date: 2024  Patient Name: Elli Coulter        MRN: [id number]   : 1955  (69 y.o.)  Gender: female   Referring Practitioner: Serenity Spears APRN - CNP  Diagnosis: Kidney, perinephric abcess         REASON FOR MISSED TREATMENT:  Missed Treat.  RN ok'ed session. Upon arrival patient was laying in bed and voices that she does not want to complete therapy today due to \"I just did therapy in the hospital today with my arms\". Patient educated on difference between PT and OT and benefits of OOB activity and ambulation however patient continued to refuse. PT to attempt to see at next available date as time allows and as willing.

## 2024-08-27 NOTE — PROGRESS NOTES
[facility]  [facility] ORTHOPED[facility] 7K  Occupational Therapy  Daily Note    Discharge Recommendations: Subacute/skilled nursing facility and 24 hour assistance or supervision  Equipment Recommendations: Equipment Needed: No  Other: continue to monitor       Time In: 1410  Time Out: 1440  Timed Code Treatment Minutes: 30 Minutes  Minutes: 30   Time In: 1510  Time Out: 1522  Timed Code Treatment Minutes: 12 Minutes  Minutes: 12       Date: 2024  Patient Name: Elli Coulter,   Gender: female      [facility]  MRN: [id number]  : 1955  (69 y.o.)  Referring Practitioner: Serenity Spears APRN - CNP  Diagnosis: Kind[place], perinephric absecess  Additional Pertinent Hx: Per H&P: \"Elli Coulter is a 69 y.o. female with PMHx of Left perinephric abscess, COPD, DM who presented to [facility] with chief complaint of abdominal pain. Patient reports having increased left flank pain that radiates down her right leg over the past few days. Reports some associated nausea but denies emesis. Reports some fever and chills at ECF but does not know how high her temp was. Reports several ED visits and hospitalizations for the same symptoms and issues with her nephrostomy tube in the past. Denies any other symptoms. Denies headache, dizziness, or lightheadedness. Denies chest pain or SOB. Denies abdominal pain. Denies dysuria or hematuria.\"    Restrictions/Precautions:  Restrictions/Precautions: Fall Risk, General Precautions      Social/Functional History:  Lives With: Alone  Type of Home: Assisted living  Home Layout: One level  Home Access: Level entry  Home Equipment: Wheelchair - Manual, Walker - Rolling, Grab bars   Bathroom Shower/Tub: Walk-in shower  Bathroom Toilet: Standard  Bathroom Equipment: Grab bars in shower, Grab bars around toilet, Shower chair       ADL Assistance: Needs assistance (needs assistance to shower and toileting)  Ambulation Assistance: Independent  Transfer Assistance: Independent      position with all fall risk precautions in place.

## 2024-08-27 NOTE — DISCHARGE SUMMARY
Hospitalist Discharge Summary    Patient: Elli Coulter  YOB: 1955  MRN: [id number]   Acct: [id number]    Primary Care Physician: Ryan Montalvo MD    Admit date  8/8/2024    Discharge date:  8/27/2024    Chief Complaint: \"abdominal pain\"  Initial HPI: History obtained per patient and chart review.   \"Elli Coulter is a 69 y.o. female with PMHx of Left perinephric abscess, COPD, DM who presented to [facility] with chief complaint of abdominal pain. Patient reports having increased left flank pain that radiates down her right leg over the past few days. Reports some associated nausea but denies emesis. Reports some fever and chills at ECF but does not know how high her temp was. Reports several ED visits and hospitalizations for the same symptoms and issues with her nephrostomy tube in the past. Denies any other symptoms. Denies headache, dizziness, or lightheadedness. Denies chest pain or SOB. Denies abdominal pain. Denies dysuria or hematuria. \"          8/9: \"Patient examined and evaluated was resting on the bed was awake alert noted to be forgetful.Was on room air breathing non labored, patient reports feeling tired,denies sob,cp, palpitations, fever or chill.Patient had drain placement today. RN reported earlier on patient noted to have some confusion, speech therapist consulted to evaluate\"     8/10: \" Patient examined  was resting on the bed was awake alert, reports feeling fatigue, no agitation noted  forgets easily on room air breathing non labored.Denies sob,cp, fever or chills,nausea or vomiting.  Per speech therapy not patient refused to complete evaluation despite education, will re-attempt later when patient mentally appropriate\"     8/11: \" Patient examined  was resting on the bed was awake alert, reports had a lot of pain last night had pain medications given improved. During exam states pain under control. Denies fever chills.\"     8/12: \"Patient examined  was resting on the bed  IV CONTRAST Additional Contrast? Radiologist Recommendation   Final Result      1. Significant interval improvement since previous study dated 8/8/2024 with   almost complete drainage of the left perinephric and psoas abscess. Small   residual abnormal density behind the left kidney measuring 2.8 x 1.9 cm in size.   2. 7.9 mm hypodensity in the left lobe of the liver. Mild splenomegaly.   3. Status post cholecystectomy and hysterectomy.   4. Lumbar spondylosis.   5. Atherosclerotic calcification in the abdominal aorta and iliac arteries.   6. Diverticulosis involving the colon..               **This report has been created using voice recognition software. It may contain   minor errors which are inherent in voice recognition technology.**            Electronically signed by Dr. Ryan Stevens      CT DRAINAGE HEMATOMA/SEROMA/FLUID COLLECTION   Final Result   Status post successful abscess drainage procedure..               **This report has been created using voice recognition software.  It may contain   minor errors which are inherent in voice recognition technology.**               Electronically signed by Dr Anshul Pablo      CT ABDOMEN PELVIS W IV CONTRAST Additional Contrast? None   Final Result   Impression:   1. Left perinephric abscess measures 3.2 x 2.3 x 5.4 cm is larger on    todays exam compared to 07/30/2024 and is having increased mass effect    upon the left kidney.   2. Left psoas/lateral abdominal abscess measures 2.8 x 8.9 x 7.9 cm and is    increased in size compared to prior CT      This document has been electronically signed by: Prerna Mccormick MD on    08/08/2024 08:22 PM      All CTs at this facility use dose modulation techniques and iterative    reconstructions, and/or weight-based dosing   when appropriate to reduce radiation to a low as reasonably achievable.          Consults:   IP CONSULT TO UROLOGY  IP CONSULT TO SOCIAL WORK    Discharge Medications:      Medication List       senna 8.6 MG tablet          Patient Instructions:    Discharge lab work: CBC in 1 week.  Activity: activity as tolerated  Diet: ADULT DIET; Regular; 3 carb choices (45 gm/meal)  ADULT ORAL NUTRITION SUPPLEMENT; Breakfast, Lunch, Dinner; Other Oral Supplement; Activia yogurt      Follow-up visits:   Ryan Montalvo MD  [address]  [facility] [address]    Schedule an appointment as soon as possible for a visit in 1 week(s)  [facility] please call to schedule a 1 week hospital follow up appointment.    [facility]  [address] [facility] [address] [phone]      [facility]       Disposition: SNF  Condition at Discharge: Stable    Time Spent: 55 minutes    Signed:    Thank you Ryan Montalvo MD for the opportunity to be involved in this patient's care.    Electronically signed by Aliyah Lala PA-C on 8/27/2024 at 2:54 PM  Discharging Hospitalist

## 2024-08-27 NOTE — PROGRESS NOTES
SubCUTAneous TID WC    insulin lispro  0-4 Units SubCUTAneous Nightly    QUEtiapine  350 mg Oral Nightly    sodium chloride flush  5-40 mL IntraVENous 2 times per day    enoxaparin  40 mg SubCUTAneous Daily    lidocaine  2 patch TransDERmal Daily    atorvastatin  40 mg Oral Nightly    buPROPion  300 mg Oral QAM    carvedilol  3.125 mg Oral BID    escitalopram  20 mg Oral Daily    fenofibrate  160 mg Oral Daily    ferrous sulfate  325 mg Oral Q48H    fluticasone  1 spray Each Nostril BID    levothyroxine  75 mcg Oral QAM AC    pantoprazole  40 mg Oral BID AC    traZODone  200 mg Oral Nightly    PRN Meds: hydrALAZINE, HYDROcodone-acetaminophen, sodium chloride flush, sodium chloride, acetaminophen **OR** acetaminophen, glucose, dextrose bolus **OR** dextrose bolus, glucagon (rDNA), dextrose, albuterol    Exam:  /76   Pulse 72   Temp 97.3 °F (36.3 °C) (Oral)   Resp 16   Ht 1.626 m (5' 4\")   Wt 75.8 kg (167 lb 1.7 oz)   SpO2 96%   BMI 28.68 kg/m²   General appearance: Chronically ill appearing. No apparent distress, appears stated age and cooperative.   HEENT: Pupils equal, round, and reactive to light. Conjunctivae/corneas clear.  Neck: Supple, with full range of motion. Trachea midline.  Respiratory:  Normal respiratory effort. Clear to auscultation, bilaterally without Rales/Wheezes/Rhonchi.  Cardiovascular: Regular rate and rhythm with normal S1/S2 without murmurs, rubs or gallops.  Abdomen: Tenderness to palpation of abdomen, generalized. Gauze dressing noted on left flank- appears clean, dry, intact tenderness to palpation of left flank. Soft, non-distended with normal bowel sounds.   Musculoskeletal: Tenderness to palpation to paraspinal muscles. passive and active ROM x 4 extremities.  Skin: Skin color, texture, turgor normal.  No rashes or lesions.  Neurologic: hard of hearing.  Neurovascularly intact without any gross focal sensory/motor deficits. Cranial nerves: II-XII intact, grossly  CATHETER: 8 Portuguese multipurpose drainage catheter. FLUID OBTAINED: Purulent appearing fluid SEDATION: Versed 1 mg; fentanyl 50 mcg, IV; the patient was sedated during this procedure,  and monitored with EKG and pulse ox monitoring devices by a registered nurse. Face-to-face time with patient 10 minutes. PROCEDURE: Signed informed consent was obtained prior to performing this procedure. The patient was placed on the CT scanner and sedated, as indicated above. ALL CT SCANS AT THIS FACILITY use dose modulation, iterative reconstruction, and/or weight-based dosing when appropriate to reduce radiation dose to as low as reasonably achievable. CT images were initially obtained to determine appropriate puncture site. The skin was marked, prepped, and draped in a sterile fashion. Following local anesthesia and utilizing aseptic technique, a needle was successfully passed into the abscess pocket. A small amount of pus was aspirated to confirm appropriate needle position. A guidewire was then passed through the needle followed by insertion of progressively larger dilators up to an 8 Portuguese size. An 8 Portuguese multi-side-hole drainage catheter was then passed over the wire and was coiled within the abscess pocket. The retaining suture was then locked in the standard fashion. Catheter was then hooked to a Jcarlos-Close drainage bag and the catheter was flushed several times with sterile saline. The catheter was stabilized to the skin with a Percu-Stay device. A sample of the pus was sent to laboratory for culture and sensitivity testing.     Status post successful abscess drainage procedure.. **This report has been created using voice recognition software.  It may contain minor errors which are inherent in voice recognition technology.** Electronically signed by Dr Anshul Pablo    CT ABDOMEN PELVIS W IV CONTRAST Additional Contrast? None    Result Date: 8/8/2024  Exam: CT Abdomen and Pelvis with contrast Comparison: 07/30/2024  Clinical history: Left flank pain Findings: Lung bases are clear. Small hiatal hernia Enlarged fatty infiltrated liver. Stable hypodense lesion in the right lobe of the liver likely represents a small cyst Splenomegaly. No acute pancreatitis. Prior cholecystectomy No choledocholithiasis or biliary obstruction. Normal adrenal glands. Right kidney demonstrates normal enhancement. No right renal stones. No right urinary obstruction. Left renal stones. No hydronephrosis. No left ureteral calculi or urinary obstruction Left perinephric abscess measures 3.2 x 2.3 x 5.4 cm. Abscess appears larger on todays exam compared to 07/30/2024 and is having increased mass effect upon the left kidney.. Left psoas/lateral abdominal abscess measures 2.8 x 8.9 x 7.9 cm and is increased in size compared to prior CT Edema seen along the prior nephrostomy tube tract in the posterior subcutaneous fat of the left flank. No abdominal aortic aneurysm. No small bowel obstruction Appendix is normal in caliber No colitis or diverticulitis. Urinary bladder does not demonstrate stones or wall thickening. Prior hysterectomy. No free fluid Bilateral femoral head AVN. Mild multilevel degenerative disease of the lumbar spine.     Impression: 1. Left perinephric abscess measures 3.2 x 2.3 x 5.4 cm is larger on todays exam compared to 07/30/2024 and is having increased mass effect upon the left kidney. 2. Left psoas/lateral abdominal abscess measures 2.8 x 8.9 x 7.9 cm and is increased in size compared to prior CT This document has been electronically signed by: Prerna Mccormick MD on 08/08/2024 08:22 PM All CTs at this facility use dose modulation techniques and iterative reconstructions, and/or weight-based dosing when appropriate to reduce radiation to a low as reasonably achievable.      Electronically signed by Aliyah Lala PA-C on 8/27/2024 at 9:24 AM

## 2024-08-27 NOTE — CARE COORDINATION
8/27/24, 2:53 PM EDT    DISCHARGE PLANNING EVALUATION    Spoke with [facility], snf can accept back today.   Spoke with sister, Bailey, who is in agreement.    Transport scheduled for 6:30  pm      8/27/24, 2:58 PM EDT    Patient goals/plan/ treatment preferences discussed by  and .  Patient goals/plan/ treatment preferences reviewed with patient/ family.  Patient/ family verbalize understanding of discharge plan and are in agreement with goal/plan/treatment preferences.  Understanding was demonstrated using the teach back method.  AVS provided by RN at time of discharge, which includes all necessary medical information pertaining to the patients current course of illness, treatment, post-discharge goals of care, and treatment preferences.     Services At/After Discharge: Skilled Nursing Facility (SNF) and In ambulance

## 2024-08-27 NOTE — CARE COORDINATION
8/27/24, 8:15 AM EDT    DISCHARGE PLANNING EVALUATION   Spoke with [facility] admissions, expedited appeal is still pending, inquired if patient could admit under medicaid, as there is no expected date for decision on the appeal.  [facility] is reviewing this, waiting decision.

## 2024-08-27 NOTE — PROGRESS NOTES
Report called to joey LPN at this time. All questions answered. All belongings packed and ready for discharge. [employer] [facility] to transport at 1830.

## 2024-08-27 NOTE — DISCHARGE INSTR - DIET

## 2024-08-27 NOTE — PROGRESS NOTES
Comprehensive Nutrition Assessment    Type and Reason for Visit:  Initial, RD Nutrition Re-Screen/LOS    Nutrition Recommendations/Plan:   Continue diet per provider and encourage adequate PO intake at best efforts.   Continue ONS: Activia (TID) - to assist with BM motivation  Recommend MVI     Malnutrition Assessment:  Malnutrition Status:  At risk for malnutrition (Comment) (08/27/24 2999)    Context:  Acute Illness     Findings of the 6 clinical characteristics of malnutrition:  Energy Intake:  Mild decrease in energy intake (Comment)  Weight Loss:  No significant weight loss (per EMR; however, note hx/o CHF)     Body Fat Loss:  No significant body fat loss     Muscle Mass Loss:  No significant muscle mass loss    Fluid Accumulation:  Unable to assess     Strength:  Not Performed    Nutrition Assessment:     Pt. nutritionally compromised AEB variable PO intakes this admit and continued constipation.  At risk for further nutrition compromise r/t recurrent L perinephric abscess - drain removed 8/23 and with improvements per CT A/P 8/22, physical deconditioning, constipation and underlying medical condition (PMHx: Arthritis, bipolar disorder, cancerous polyps removed during colonoscopy, COPD, depression, diverticulitis, GERD, glaucoma, [place], hiatal hernia, stroke 2019, current tobacco use, previous cocaine use).       Nutrition Related Findings:    Pt. Report/Treatments/Miscellaneous: Pt seen - did not offer much in conversation. Pt feels like she is eating \"a lot\" right now and denies accuracy with variable PO intakes reported. Denies N/V - continues to feel constipated. Pt is eating activia yogurt even though she dislikes it in hopes to motivate bowels.   GI Status: BM  - 8/23; constipation - on bowel regimen  Pertinent Labs: Na 131, glucose 224  Pertinent Meds: lipitor, dulcolax, lexapro, fenofibrate, ferrous sulfate, lantus, humalog, synthroid, protonix, seroquel, senna, trazodone, norco     Wound Type:

## 2024-09-09 LAB
FUNGUS SPEC CULT: NORMAL
FUNGUS SPEC FUNGUS STN: NORMAL

## 2024-09-10 ENCOUNTER — APPOINTMENT (OUTPATIENT)
Dept: CT IMAGING | Age: 69
DRG: 871 | End: 2024-09-10
Payer: MEDICARE

## 2024-09-10 ENCOUNTER — HOSPITAL ENCOUNTER (INPATIENT)
Age: 69
LOS: 5 days | Discharge: ANOTHER HOSPITAL | DRG: 871 | End: 2024-09-15
Attending: EMERGENCY MEDICINE | Admitting: STUDENT IN AN ORGANIZED HEALTH CARE EDUCATION/TRAINING PROGRAM
Payer: MEDICARE

## 2024-09-10 DIAGNOSIS — R18.8 RETROPERITONEAL FLUID COLLECTION: ICD-10-CM

## 2024-09-10 DIAGNOSIS — N15.1 PERINEPHRIC ABSCESS: Primary | ICD-10-CM

## 2024-09-10 LAB
ALBUMIN SERPL BCG-MCNC: 3.1 G/DL (ref 3.5–5.1)
ALP SERPL-CCNC: 85 U/L (ref 38–126)
ALT SERPL W/O P-5'-P-CCNC: 28 U/L (ref 11–66)
ANION GAP SERPL CALC-SCNC: 13 MEQ/L (ref 8–16)
AST SERPL-CCNC: 38 U/L (ref 5–40)
BACTERIA URNS QL MICRO: ABNORMAL /HPF
BASOPHILS ABSOLUTE: 0 THOU/MM3 (ref 0–0.1)
BASOPHILS NFR BLD AUTO: 0.4 %
BILIRUB SERPL-MCNC: 0.3 MG/DL (ref 0.3–1.2)
BILIRUB UR QL STRIP.AUTO: NEGATIVE
BUN SERPL-MCNC: 14 MG/DL (ref 7–22)
CALCIUM SERPL-MCNC: 10 MG/DL (ref 8.5–10.5)
CASTS #/AREA URNS LPF: ABNORMAL /LPF
CASTS 2: ABNORMAL /LPF
CHARACTER UR: CLEAR
CHLORIDE SERPL-SCNC: 94 MEQ/L (ref 98–111)
CO2 SERPL-SCNC: 21 MEQ/L (ref 23–33)
COLOR, UA: YELLOW
CREAT SERPL-MCNC: 0.7 MG/DL (ref 0.4–1.2)
CRYSTALS URNS MICRO: ABNORMAL
DEPRECATED RDW RBC AUTO: 45.6 FL (ref 35–45)
EOSINOPHIL NFR BLD AUTO: 0.7 %
EOSINOPHILS ABSOLUTE: 0.1 THOU/MM3 (ref 0–0.4)
EPITHELIAL CELLS, UA: ABNORMAL /HPF
ERYTHROCYTE [DISTWIDTH] IN BLOOD BY AUTOMATED COUNT: 14.2 % (ref 11.5–14.5)
GFR SERPL CREATININE-BSD FRML MDRD: > 90 ML/MIN/1.73M2
GLUCOSE BLD STRIP.AUTO-MCNC: 154 MG/DL (ref 70–108)
GLUCOSE SERPL-MCNC: 192 MG/DL (ref 70–108)
GLUCOSE UR QL STRIP.AUTO: NEGATIVE MG/DL
HCT VFR BLD AUTO: 33.8 % (ref 37–47)
HGB BLD-MCNC: 10.5 GM/DL (ref 12–16)
HGB UR QL STRIP.AUTO: ABNORMAL
IMM GRANULOCYTES # BLD AUTO: 0.08 THOU/MM3 (ref 0–0.07)
IMM GRANULOCYTES NFR BLD AUTO: 0.7 %
KETONES UR QL STRIP.AUTO: NEGATIVE
LIPASE SERPL-CCNC: 26.7 U/L (ref 5.6–51.3)
LYMPHOCYTES ABSOLUTE: 1.7 THOU/MM3 (ref 1–4.8)
LYMPHOCYTES NFR BLD AUTO: 15.3 %
MCH RBC QN AUTO: 27.3 PG (ref 26–33)
MCHC RBC AUTO-ENTMCNC: 31.1 GM/DL (ref 32.2–35.5)
MCV RBC AUTO: 88 FL (ref 81–99)
MISCELLANEOUS 2: ABNORMAL
MONOCYTES ABSOLUTE: 0.8 THOU/MM3 (ref 0.4–1.3)
MONOCYTES NFR BLD AUTO: 7.6 %
NEUTROPHILS ABSOLUTE: 8.4 THOU/MM3 (ref 1.8–7.7)
NEUTROPHILS NFR BLD AUTO: 75.3 %
NITRITE UR QL STRIP: NEGATIVE
NRBC BLD AUTO-RTO: 0 /100 WBC
OSMOLALITY SERPL CALC.SUM OF ELEC: 262.7 MOSMOL/KG (ref 275–300)
PH UR STRIP.AUTO: 6 [PH] (ref 5–9)
PLATELET # BLD AUTO: 248 THOU/MM3 (ref 130–400)
PMV BLD AUTO: 9.8 FL (ref 9.4–12.4)
POTASSIUM SERPL-SCNC: 5 MEQ/L (ref 3.5–5.2)
PROT SERPL-MCNC: 7.3 G/DL (ref 6.1–8)
PROT UR STRIP.AUTO-MCNC: NEGATIVE MG/DL
RBC # BLD AUTO: 3.84 MILL/MM3 (ref 4.2–5.4)
RBC URINE: ABNORMAL /HPF
RENAL EPI CELLS #/AREA URNS HPF: ABNORMAL /[HPF]
SODIUM SERPL-SCNC: 128 MEQ/L (ref 135–145)
SP GR UR REFRACT.AUTO: 1.01 (ref 1–1.03)
UROBILINOGEN, URINE: 1 EU/DL (ref 0–1)
WBC # BLD AUTO: 11.1 THOU/MM3 (ref 4.8–10.8)
WBC #/AREA URNS HPF: ABNORMAL /HPF
WBC #/AREA URNS HPF: ABNORMAL /[HPF]
YEAST LIKE FUNGI URNS QL MICRO: ABNORMAL

## 2024-09-10 PROCEDURE — 2580000003 HC RX 258: Performed by: EMERGENCY MEDICINE

## 2024-09-10 PROCEDURE — 83690 ASSAY OF LIPASE: CPT

## 2024-09-10 PROCEDURE — 96365 THER/PROPH/DIAG IV INF INIT: CPT

## 2024-09-10 PROCEDURE — 87086 URINE CULTURE/COLONY COUNT: CPT

## 2024-09-10 PROCEDURE — 81001 URINALYSIS AUTO W/SCOPE: CPT

## 2024-09-10 PROCEDURE — 96375 TX/PRO/DX INJ NEW DRUG ADDON: CPT

## 2024-09-10 PROCEDURE — 74177 CT ABD & PELVIS W/CONTRAST: CPT

## 2024-09-10 PROCEDURE — 1200000003 HC TELEMETRY R&B

## 2024-09-10 PROCEDURE — 85025 COMPLETE CBC W/AUTO DIFF WBC: CPT

## 2024-09-10 PROCEDURE — 6360000004 HC RX CONTRAST MEDICATION: Performed by: EMERGENCY MEDICINE

## 2024-09-10 PROCEDURE — 6360000002 HC RX W HCPCS: Performed by: EMERGENCY MEDICINE

## 2024-09-10 PROCEDURE — 99285 EMERGENCY DEPT VISIT HI MDM: CPT

## 2024-09-10 PROCEDURE — 99223 1ST HOSP IP/OBS HIGH 75: CPT | Performed by: PHYSICIAN ASSISTANT

## 2024-09-10 PROCEDURE — 80053 COMPREHEN METABOLIC PANEL: CPT

## 2024-09-10 PROCEDURE — 36415 COLL VENOUS BLD VENIPUNCTURE: CPT

## 2024-09-10 PROCEDURE — 96367 TX/PROPH/DG ADDL SEQ IV INF: CPT

## 2024-09-10 PROCEDURE — 82948 REAGENT STRIP/BLOOD GLUCOSE: CPT

## 2024-09-10 RX ORDER — INSULIN LISPRO 100 [IU]/ML
0-4 INJECTION, SOLUTION INTRAVENOUS; SUBCUTANEOUS EVERY 4 HOURS
Status: DISCONTINUED | OUTPATIENT
Start: 2024-09-10 | End: 2024-09-11

## 2024-09-10 RX ORDER — IOPAMIDOL 755 MG/ML
80 INJECTION, SOLUTION INTRAVASCULAR
Status: COMPLETED | OUTPATIENT
Start: 2024-09-10 | End: 2024-09-10

## 2024-09-10 RX ORDER — FENTANYL CITRATE 50 UG/ML
50 INJECTION, SOLUTION INTRAMUSCULAR; INTRAVENOUS ONCE
Status: COMPLETED | OUTPATIENT
Start: 2024-09-10 | End: 2024-09-10

## 2024-09-10 RX ADMIN — VANCOMYCIN HYDROCHLORIDE 1250 MG: 5 INJECTION, POWDER, LYOPHILIZED, FOR SOLUTION INTRAVENOUS at 22:26

## 2024-09-10 RX ADMIN — IOPAMIDOL 80 ML: 755 INJECTION, SOLUTION INTRAVENOUS at 20:42

## 2024-09-10 RX ADMIN — FENTANYL CITRATE 50 MCG: 50 INJECTION, SOLUTION INTRAMUSCULAR; INTRAVENOUS at 21:02

## 2024-09-10 RX ADMIN — PIPERACILLIN AND TAZOBACTAM 4500 MG: 4; .5 INJECTION, POWDER, LYOPHILIZED, FOR SOLUTION INTRAVENOUS at 21:51

## 2024-09-10 ASSESSMENT — PAIN SCALES - GENERAL
PAINLEVEL_OUTOF10: 10
PAINLEVEL_OUTOF10: 10
PAINLEVEL_OUTOF10: 7
PAINLEVEL_OUTOF10: 9
PAINLEVEL_OUTOF10: 10

## 2024-09-10 ASSESSMENT — PAIN DESCRIPTION - LOCATION: LOCATION: ABDOMEN;FLANK

## 2024-09-10 ASSESSMENT — PAIN - FUNCTIONAL ASSESSMENT
PAIN_FUNCTIONAL_ASSESSMENT: 0-10
PAIN_FUNCTIONAL_ASSESSMENT: 0-10

## 2024-09-10 ASSESSMENT — PAIN DESCRIPTION - ORIENTATION: ORIENTATION: RIGHT;LEFT

## 2024-09-11 ENCOUNTER — APPOINTMENT (OUTPATIENT)
Dept: CT IMAGING | Age: 69
DRG: 871 | End: 2024-09-11
Payer: MEDICARE

## 2024-09-11 PROBLEM — Z79.4 DIABETES MELLITUS TYPE 2, INSULIN DEPENDENT (HCC): Status: ACTIVE | Noted: 2019-07-20

## 2024-09-11 PROBLEM — R07.9 CHEST PAIN: Status: ACTIVE | Noted: 2024-09-11

## 2024-09-11 LAB
ANION GAP SERPL CALC-SCNC: 11 MEQ/L (ref 8–16)
BASOPHILS ABSOLUTE: 0 THOU/MM3 (ref 0–0.1)
BASOPHILS NFR BLD AUTO: 0.2 %
BUN SERPL-MCNC: 12 MG/DL (ref 7–22)
CALCIUM SERPL-MCNC: 9.9 MG/DL (ref 8.5–10.5)
CHLORIDE SERPL-SCNC: 96 MEQ/L (ref 98–111)
CO2 SERPL-SCNC: 24 MEQ/L (ref 23–33)
CREAT SERPL-MCNC: 0.7 MG/DL (ref 0.4–1.2)
DEPRECATED RDW RBC AUTO: 43.6 FL (ref 35–45)
EOSINOPHIL NFR BLD AUTO: 0.6 %
EOSINOPHILS ABSOLUTE: 0.1 THOU/MM3 (ref 0–0.4)
ERYTHROCYTE [DISTWIDTH] IN BLOOD BY AUTOMATED COUNT: 14.5 % (ref 11.5–14.5)
GFR SERPL CREATININE-BSD FRML MDRD: > 90 ML/MIN/1.73M2
GLUCOSE BLD STRIP.AUTO-MCNC: 123 MG/DL (ref 70–108)
GLUCOSE BLD STRIP.AUTO-MCNC: 128 MG/DL (ref 70–108)
GLUCOSE BLD STRIP.AUTO-MCNC: 135 MG/DL (ref 70–108)
GLUCOSE BLD STRIP.AUTO-MCNC: 137 MG/DL (ref 70–108)
GLUCOSE BLD STRIP.AUTO-MCNC: 148 MG/DL (ref 70–108)
GLUCOSE BLD STRIP.AUTO-MCNC: 166 MG/DL (ref 70–108)
GLUCOSE SERPL-MCNC: 138 MG/DL (ref 70–108)
HCT VFR BLD AUTO: 30.2 % (ref 37–47)
HGB BLD-MCNC: 9.9 GM/DL (ref 12–16)
IMM GRANULOCYTES # BLD AUTO: 0.11 THOU/MM3 (ref 0–0.07)
IMM GRANULOCYTES NFR BLD AUTO: 0.9 %
LYMPHOCYTES ABSOLUTE: 1.6 THOU/MM3 (ref 1–4.8)
LYMPHOCYTES NFR BLD AUTO: 12.7 %
MCH RBC QN AUTO: 27.3 PG (ref 26–33)
MCHC RBC AUTO-ENTMCNC: 32.8 GM/DL (ref 32.2–35.5)
MCV RBC AUTO: 83.2 FL (ref 81–99)
MONOCYTES ABSOLUTE: 0.9 THOU/MM3 (ref 0.4–1.3)
MONOCYTES NFR BLD AUTO: 7.4 %
NEUTROPHILS ABSOLUTE: 9.6 THOU/MM3 (ref 1.8–7.7)
NEUTROPHILS NFR BLD AUTO: 78.2 %
NRBC BLD AUTO-RTO: 0 /100 WBC
PLATELET # BLD AUTO: 434 THOU/MM3 (ref 130–400)
PMV BLD AUTO: 8.7 FL (ref 9.4–12.4)
POTASSIUM SERPL-SCNC: 4.3 MEQ/L (ref 3.5–5.2)
RBC # BLD AUTO: 3.63 MILL/MM3 (ref 4.2–5.4)
SODIUM SERPL-SCNC: 131 MEQ/L (ref 135–145)
TROPONIN, HIGH SENSITIVITY: 13 NG/L (ref 0–12)
TROPONIN, HIGH SENSITIVITY: 14 NG/L (ref 0–12)
TROPONIN, HIGH SENSITIVITY: 14 NG/L (ref 0–12)
WBC # BLD AUTO: 12.3 THOU/MM3 (ref 4.8–10.8)

## 2024-09-11 PROCEDURE — 6360000002 HC RX W HCPCS: Performed by: PHYSICIAN ASSISTANT

## 2024-09-11 PROCEDURE — 99233 SBSQ HOSP IP/OBS HIGH 50: CPT | Performed by: STUDENT IN AN ORGANIZED HEALTH CARE EDUCATION/TRAINING PROGRAM

## 2024-09-11 PROCEDURE — 0T9130Z DRAINAGE OF LEFT KIDNEY WITH DRAINAGE DEVICE, PERCUTANEOUS APPROACH: ICD-10-PCS | Performed by: STUDENT IN AN ORGANIZED HEALTH CARE EDUCATION/TRAINING PROGRAM

## 2024-09-11 PROCEDURE — 6370000000 HC RX 637 (ALT 250 FOR IP): Performed by: PHYSICIAN ASSISTANT

## 2024-09-11 PROCEDURE — 87070 CULTURE OTHR SPECIMN AEROBIC: CPT

## 2024-09-11 PROCEDURE — 2709999900 CT ABSCESS DRAINAGE W CATH PLACEMENT S&I

## 2024-09-11 PROCEDURE — 6370000000 HC RX 637 (ALT 250 FOR IP): Performed by: STUDENT IN AN ORGANIZED HEALTH CARE EDUCATION/TRAINING PROGRAM

## 2024-09-11 PROCEDURE — 80048 BASIC METABOLIC PNL TOTAL CA: CPT

## 2024-09-11 PROCEDURE — 93005 ELECTROCARDIOGRAM TRACING: CPT | Performed by: STUDENT IN AN ORGANIZED HEALTH CARE EDUCATION/TRAINING PROGRAM

## 2024-09-11 PROCEDURE — 36415 COLL VENOUS BLD VENIPUNCTURE: CPT

## 2024-09-11 PROCEDURE — 1200000003 HC TELEMETRY R&B

## 2024-09-11 PROCEDURE — 2580000003 HC RX 258: Performed by: PHYSICIAN ASSISTANT

## 2024-09-11 PROCEDURE — 87075 CULTR BACTERIA EXCEPT BLOOD: CPT

## 2024-09-11 PROCEDURE — 6360000002 HC RX W HCPCS: Performed by: RADIOLOGY

## 2024-09-11 PROCEDURE — 6360000002 HC RX W HCPCS: Performed by: STUDENT IN AN ORGANIZED HEALTH CARE EDUCATION/TRAINING PROGRAM

## 2024-09-11 PROCEDURE — 85025 COMPLETE CBC W/AUTO DIFF WBC: CPT

## 2024-09-11 PROCEDURE — 1200000000 HC SEMI PRIVATE

## 2024-09-11 PROCEDURE — 84484 ASSAY OF TROPONIN QUANT: CPT

## 2024-09-11 PROCEDURE — 82948 REAGENT STRIP/BLOOD GLUCOSE: CPT

## 2024-09-11 PROCEDURE — 87205 SMEAR GRAM STAIN: CPT

## 2024-09-11 PROCEDURE — 99221 1ST HOSP IP/OBS SF/LOW 40: CPT | Performed by: UROLOGY

## 2024-09-11 PROCEDURE — 2580000003 HC RX 258

## 2024-09-11 PROCEDURE — 2580000003 HC RX 258: Performed by: STUDENT IN AN ORGANIZED HEALTH CARE EDUCATION/TRAINING PROGRAM

## 2024-09-11 PROCEDURE — 6360000002 HC RX W HCPCS

## 2024-09-11 RX ORDER — ESCITALOPRAM OXALATE 20 MG/1
20 TABLET ORAL DAILY
Status: DISCONTINUED | OUTPATIENT
Start: 2024-09-11 | End: 2024-09-15 | Stop reason: HOSPADM

## 2024-09-11 RX ORDER — HYDROCODONE BITARTRATE AND ACETAMINOPHEN 5; 325 MG/1; MG/1
1 TABLET ORAL EVERY 8 HOURS PRN
Status: DISCONTINUED | OUTPATIENT
Start: 2024-09-11 | End: 2024-09-11

## 2024-09-11 RX ORDER — CARVEDILOL 3.12 MG/1
3.12 TABLET ORAL 2 TIMES DAILY
Status: DISCONTINUED | OUTPATIENT
Start: 2024-09-11 | End: 2024-09-15 | Stop reason: HOSPADM

## 2024-09-11 RX ORDER — TRAZODONE HYDROCHLORIDE 100 MG/1
200 TABLET ORAL NIGHTLY
Status: DISCONTINUED | OUTPATIENT
Start: 2024-09-11 | End: 2024-09-15 | Stop reason: HOSPADM

## 2024-09-11 RX ORDER — DEXTROSE MONOHYDRATE 100 MG/ML
INJECTION, SOLUTION INTRAVENOUS CONTINUOUS PRN
Status: DISCONTINUED | OUTPATIENT
Start: 2024-09-11 | End: 2024-09-15 | Stop reason: HOSPADM

## 2024-09-11 RX ORDER — ALBUTEROL SULFATE 0.83 MG/ML
2.5 SOLUTION RESPIRATORY (INHALATION) EVERY 6 HOURS PRN
Status: DISCONTINUED | OUTPATIENT
Start: 2024-09-11 | End: 2024-09-15 | Stop reason: HOSPADM

## 2024-09-11 RX ORDER — MAGNESIUM SULFATE IN WATER 40 MG/ML
2000 INJECTION, SOLUTION INTRAVENOUS PRN
Status: DISCONTINUED | OUTPATIENT
Start: 2024-09-11 | End: 2024-09-15 | Stop reason: HOSPADM

## 2024-09-11 RX ORDER — POLYETHYLENE GLYCOL 3350 17 G/17G
17 POWDER, FOR SOLUTION ORAL DAILY PRN
Status: DISCONTINUED | OUTPATIENT
Start: 2024-09-11 | End: 2024-09-15 | Stop reason: HOSPADM

## 2024-09-11 RX ORDER — INSULIN GLARGINE 100 [IU]/ML
14 INJECTION, SOLUTION SUBCUTANEOUS NIGHTLY
Status: DISCONTINUED | OUTPATIENT
Start: 2024-09-11 | End: 2024-09-15 | Stop reason: HOSPADM

## 2024-09-11 RX ORDER — GLUCAGON 1 MG/ML
1 KIT INJECTION PRN
Status: DISCONTINUED | OUTPATIENT
Start: 2024-09-11 | End: 2024-09-15 | Stop reason: HOSPADM

## 2024-09-11 RX ORDER — SODIUM CHLORIDE 0.9 % (FLUSH) 0.9 %
5-40 SYRINGE (ML) INJECTION PRN
Status: DISCONTINUED | OUTPATIENT
Start: 2024-09-11 | End: 2024-09-15 | Stop reason: HOSPADM

## 2024-09-11 RX ORDER — ONDANSETRON 2 MG/ML
4 INJECTION INTRAMUSCULAR; INTRAVENOUS EVERY 6 HOURS PRN
Status: DISCONTINUED | OUTPATIENT
Start: 2024-09-11 | End: 2024-09-15 | Stop reason: HOSPADM

## 2024-09-11 RX ORDER — SODIUM CHLORIDE 9 MG/ML
INJECTION, SOLUTION INTRAVENOUS PRN
Status: DISCONTINUED | OUTPATIENT
Start: 2024-09-11 | End: 2024-09-15 | Stop reason: HOSPADM

## 2024-09-11 RX ORDER — ATORVASTATIN CALCIUM 40 MG/1
40 TABLET, FILM COATED ORAL NIGHTLY
Status: DISCONTINUED | OUTPATIENT
Start: 2024-09-11 | End: 2024-09-15 | Stop reason: HOSPADM

## 2024-09-11 RX ORDER — FENTANYL CITRATE 50 UG/ML
INJECTION, SOLUTION INTRAMUSCULAR; INTRAVENOUS PRN
Status: COMPLETED | OUTPATIENT
Start: 2024-09-11 | End: 2024-09-11

## 2024-09-11 RX ORDER — HYDROCODONE BITARTRATE AND ACETAMINOPHEN 5; 325 MG/1; MG/1
1 TABLET ORAL EVERY 4 HOURS PRN
Status: DISCONTINUED | OUTPATIENT
Start: 2024-09-11 | End: 2024-09-15 | Stop reason: HOSPADM

## 2024-09-11 RX ORDER — POTASSIUM CHLORIDE 1500 MG/1
40 TABLET, EXTENDED RELEASE ORAL PRN
Status: DISCONTINUED | OUTPATIENT
Start: 2024-09-11 | End: 2024-09-15 | Stop reason: HOSPADM

## 2024-09-11 RX ORDER — INSULIN LISPRO 100 [IU]/ML
0-4 INJECTION, SOLUTION INTRAVENOUS; SUBCUTANEOUS
Status: DISCONTINUED | OUTPATIENT
Start: 2024-09-11 | End: 2024-09-15 | Stop reason: HOSPADM

## 2024-09-11 RX ORDER — HYDROCODONE BITARTRATE AND ACETAMINOPHEN 5; 325 MG/1; MG/1
2 TABLET ORAL EVERY 4 HOURS PRN
Status: DISCONTINUED | OUTPATIENT
Start: 2024-09-11 | End: 2024-09-15 | Stop reason: HOSPADM

## 2024-09-11 RX ORDER — LIDOCAINE 4 G/G
1 PATCH TOPICAL DAILY
Status: DISCONTINUED | OUTPATIENT
Start: 2024-09-11 | End: 2024-09-15 | Stop reason: HOSPADM

## 2024-09-11 RX ORDER — LEVOTHYROXINE SODIUM 75 UG/1
75 TABLET ORAL
Status: DISCONTINUED | OUTPATIENT
Start: 2024-09-11 | End: 2024-09-15 | Stop reason: HOSPADM

## 2024-09-11 RX ORDER — FLUTICASONE PROPIONATE 50 MCG
1 SPRAY, SUSPENSION (ML) NASAL 2 TIMES DAILY
Status: DISCONTINUED | OUTPATIENT
Start: 2024-09-11 | End: 2024-09-15 | Stop reason: HOSPADM

## 2024-09-11 RX ORDER — BUPROPION HYDROCHLORIDE 300 MG/1
300 TABLET ORAL EVERY MORNING
Status: DISCONTINUED | OUTPATIENT
Start: 2024-09-11 | End: 2024-09-15 | Stop reason: HOSPADM

## 2024-09-11 RX ORDER — ACETAMINOPHEN 325 MG/1
650 TABLET ORAL EVERY 6 HOURS PRN
Status: DISCONTINUED | OUTPATIENT
Start: 2024-09-11 | End: 2024-09-15 | Stop reason: HOSPADM

## 2024-09-11 RX ORDER — SODIUM CHLORIDE 0.9 % (FLUSH) 0.9 %
5-40 SYRINGE (ML) INJECTION EVERY 12 HOURS SCHEDULED
Status: DISCONTINUED | OUTPATIENT
Start: 2024-09-11 | End: 2024-09-15 | Stop reason: HOSPADM

## 2024-09-11 RX ORDER — ACETAMINOPHEN 650 MG/1
650 SUPPOSITORY RECTAL EVERY 6 HOURS PRN
Status: DISCONTINUED | OUTPATIENT
Start: 2024-09-11 | End: 2024-09-15 | Stop reason: HOSPADM

## 2024-09-11 RX ORDER — PANTOPRAZOLE SODIUM 40 MG/1
40 TABLET, DELAYED RELEASE ORAL
Status: DISCONTINUED | OUTPATIENT
Start: 2024-09-11 | End: 2024-09-15 | Stop reason: HOSPADM

## 2024-09-11 RX ORDER — ENOXAPARIN SODIUM 100 MG/ML
40 INJECTION SUBCUTANEOUS DAILY
Status: DISCONTINUED | OUTPATIENT
Start: 2024-09-11 | End: 2024-09-15 | Stop reason: HOSPADM

## 2024-09-11 RX ORDER — MIDAZOLAM HYDROCHLORIDE 1 MG/ML
INJECTION INTRAMUSCULAR; INTRAVENOUS PRN
Status: COMPLETED | OUTPATIENT
Start: 2024-09-11 | End: 2024-09-11

## 2024-09-11 RX ORDER — ONDANSETRON 4 MG/1
4 TABLET, ORALLY DISINTEGRATING ORAL EVERY 8 HOURS PRN
Status: DISCONTINUED | OUTPATIENT
Start: 2024-09-11 | End: 2024-09-15 | Stop reason: HOSPADM

## 2024-09-11 RX ORDER — PIOGLITAZONEHYDROCHLORIDE 15 MG/1
15 TABLET ORAL DAILY
Status: DISCONTINUED | OUTPATIENT
Start: 2024-09-11 | End: 2024-09-15 | Stop reason: HOSPADM

## 2024-09-11 RX ORDER — POTASSIUM CHLORIDE 7.45 MG/ML
10 INJECTION INTRAVENOUS PRN
Status: DISCONTINUED | OUTPATIENT
Start: 2024-09-11 | End: 2024-09-15 | Stop reason: HOSPADM

## 2024-09-11 RX ADMIN — QUETIAPINE FUMARATE 350 MG: 100 TABLET ORAL at 21:10

## 2024-09-11 RX ADMIN — FLUTICASONE PROPIONATE 1 SPRAY: 50 SPRAY, METERED NASAL at 21:09

## 2024-09-11 RX ADMIN — FENTANYL CITRATE 25 MCG: 50 INJECTION, SOLUTION INTRAMUSCULAR; INTRAVENOUS at 12:09

## 2024-09-11 RX ADMIN — MIDAZOLAM 0.5 MG: 1 INJECTION INTRAMUSCULAR; INTRAVENOUS at 12:09

## 2024-09-11 RX ADMIN — TRAZODONE HYDROCHLORIDE 200 MG: 100 TABLET ORAL at 21:10

## 2024-09-11 RX ADMIN — Medication 1500 MG: at 18:35

## 2024-09-11 RX ADMIN — FLUTICASONE PROPIONATE 1 SPRAY: 50 SPRAY, METERED NASAL at 09:17

## 2024-09-11 RX ADMIN — HYDROCODONE BITARTRATE AND ACETAMINOPHEN 2 TABLET: 5; 325 TABLET ORAL at 21:09

## 2024-09-11 RX ADMIN — HYDROCODONE BITARTRATE AND ACETAMINOPHEN 1 TABLET: 5; 325 TABLET ORAL at 10:46

## 2024-09-11 RX ADMIN — ATORVASTATIN CALCIUM 40 MG: 40 TABLET, FILM COATED ORAL at 21:09

## 2024-09-11 RX ADMIN — BUPROPION HYDROCHLORIDE 300 MG: 300 TABLET, EXTENDED RELEASE ORAL at 09:16

## 2024-09-11 RX ADMIN — LEVOTHYROXINE SODIUM 75 MCG: 0.07 TABLET ORAL at 06:23

## 2024-09-11 RX ADMIN — CARVEDILOL 3.12 MG: 3.12 TABLET, FILM COATED ORAL at 21:09

## 2024-09-11 RX ADMIN — HYDROCODONE BITARTRATE AND ACETAMINOPHEN 2 TABLET: 5; 325 TABLET ORAL at 14:40

## 2024-09-11 RX ADMIN — ENOXAPARIN SODIUM 40 MG: 100 INJECTION SUBCUTANEOUS at 13:14

## 2024-09-11 RX ADMIN — PANTOPRAZOLE SODIUM 40 MG: 40 TABLET, DELAYED RELEASE ORAL at 17:52

## 2024-09-11 RX ADMIN — PIPERACILLIN AND TAZOBACTAM 3375 MG: 3; .375 INJECTION, POWDER, FOR SOLUTION INTRAVENOUS at 13:39

## 2024-09-11 RX ADMIN — PANTOPRAZOLE SODIUM 40 MG: 40 TABLET, DELAYED RELEASE ORAL at 06:23

## 2024-09-11 RX ADMIN — ESCITALOPRAM OXALATE 20 MG: 20 TABLET ORAL at 09:16

## 2024-09-11 RX ADMIN — CARVEDILOL 3.12 MG: 3.12 TABLET, FILM COATED ORAL at 09:16

## 2024-09-11 RX ADMIN — PIPERACILLIN AND TAZOBACTAM 3375 MG: 3; .375 INJECTION, POWDER, FOR SOLUTION INTRAVENOUS at 21:12

## 2024-09-11 RX ADMIN — PIOGLITAZONE 15 MG: 15 TABLET ORAL at 09:16

## 2024-09-11 RX ADMIN — HYDROCODONE BITARTRATE AND ACETAMINOPHEN 1 TABLET: 5; 325 TABLET ORAL at 02:39

## 2024-09-11 RX ADMIN — SODIUM CHLORIDE, PRESERVATIVE FREE 10 ML: 5 INJECTION INTRAVENOUS at 09:16

## 2024-09-11 RX ADMIN — SODIUM CHLORIDE, PRESERVATIVE FREE 10 ML: 5 INJECTION INTRAVENOUS at 21:12

## 2024-09-11 ASSESSMENT — PAIN DESCRIPTION - DESCRIPTORS
DESCRIPTORS: ACHING
DESCRIPTORS: ACHING;SHARP;SORE
DESCRIPTORS: ACHING
DESCRIPTORS: ACHING
DESCRIPTORS: CRAMPING
DESCRIPTORS: ACHING
DESCRIPTORS: ACHING;SHARP;SORE

## 2024-09-11 ASSESSMENT — PAIN SCALES - GENERAL
PAINLEVEL_OUTOF10: 5
PAINLEVEL_OUTOF10: 10
PAINLEVEL_OUTOF10: 6
PAINLEVEL_OUTOF10: 6
PAINLEVEL_OUTOF10: 10
PAINLEVEL_OUTOF10: 9
PAINLEVEL_OUTOF10: 0
PAINLEVEL_OUTOF10: 8
PAINLEVEL_OUTOF10: 8
PAINLEVEL_OUTOF10: 4
PAINLEVEL_OUTOF10: 10
PAINLEVEL_OUTOF10: 8

## 2024-09-11 ASSESSMENT — PAIN DESCRIPTION - LOCATION
LOCATION: ABDOMEN
LOCATION: FLANK
LOCATION: ABDOMEN;FLANK
LOCATION: ABDOMEN

## 2024-09-11 ASSESSMENT — PAIN DESCRIPTION - ORIENTATION
ORIENTATION: LEFT
ORIENTATION: LEFT;MID

## 2024-09-11 ASSESSMENT — PAIN SCALES - WONG BAKER
WONGBAKER_NUMERICALRESPONSE: HURTS A LITTLE BIT
WONGBAKER_NUMERICALRESPONSE: NO HURT

## 2024-09-12 LAB
ANION GAP SERPL CALC-SCNC: 9 MEQ/L (ref 8–16)
BACTERIA UR CULT: NORMAL
BASOPHILS ABSOLUTE: 0 THOU/MM3 (ref 0–0.1)
BASOPHILS NFR BLD AUTO: 0.4 %
BUN SERPL-MCNC: 15 MG/DL (ref 7–22)
CALCIUM SERPL-MCNC: 10 MG/DL (ref 8.5–10.5)
CHLORIDE SERPL-SCNC: 98 MEQ/L (ref 98–111)
CO2 SERPL-SCNC: 26 MEQ/L (ref 23–33)
CREAT SERPL-MCNC: 0.8 MG/DL (ref 0.4–1.2)
DEPRECATED RDW RBC AUTO: 46.9 FL (ref 35–45)
EKG ATRIAL RATE: 61 BPM
EKG P AXIS: 56 DEGREES
EKG P-R INTERVAL: 144 MS
EKG Q-T INTERVAL: 430 MS
EKG QRS DURATION: 98 MS
EKG QTC CALCULATION (BAZETT): 432 MS
EKG R AXIS: 11 DEGREES
EKG T AXIS: 60 DEGREES
EKG VENTRICULAR RATE: 61 BPM
EOSINOPHIL NFR BLD AUTO: 1.7 %
EOSINOPHILS ABSOLUTE: 0.1 THOU/MM3 (ref 0–0.4)
ERYTHROCYTE [DISTWIDTH] IN BLOOD BY AUTOMATED COUNT: 14.6 % (ref 11.5–14.5)
GFR SERPL CREATININE-BSD FRML MDRD: 81 ML/MIN/1.73M2
GLUCOSE BLD STRIP.AUTO-MCNC: 135 MG/DL (ref 70–108)
GLUCOSE BLD STRIP.AUTO-MCNC: 146 MG/DL (ref 70–108)
GLUCOSE BLD STRIP.AUTO-MCNC: 147 MG/DL (ref 70–108)
GLUCOSE BLD STRIP.AUTO-MCNC: 227 MG/DL (ref 70–108)
GLUCOSE SERPL-MCNC: 139 MG/DL (ref 70–108)
HCT VFR BLD AUTO: 32.4 % (ref 37–47)
HGB BLD-MCNC: 10 GM/DL (ref 12–16)
IMM GRANULOCYTES # BLD AUTO: 0.08 THOU/MM3 (ref 0–0.07)
IMM GRANULOCYTES NFR BLD AUTO: 1 %
LYMPHOCYTES ABSOLUTE: 1.4 THOU/MM3 (ref 1–4.8)
LYMPHOCYTES NFR BLD AUTO: 18.5 %
MCH RBC QN AUTO: 27.2 PG (ref 26–33)
MCHC RBC AUTO-ENTMCNC: 30.9 GM/DL (ref 32.2–35.5)
MCV RBC AUTO: 88 FL (ref 81–99)
MONOCYTES ABSOLUTE: 0.7 THOU/MM3 (ref 0.4–1.3)
MONOCYTES NFR BLD AUTO: 9.4 %
NEUTROPHILS ABSOLUTE: 5.4 THOU/MM3 (ref 1.8–7.7)
NEUTROPHILS NFR BLD AUTO: 69 %
NRBC BLD AUTO-RTO: 0 /100 WBC
PLATELET # BLD AUTO: 484 THOU/MM3 (ref 130–400)
PMV BLD AUTO: 8.8 FL (ref 9.4–12.4)
POTASSIUM SERPL-SCNC: 4.6 MEQ/L (ref 3.5–5.2)
RBC # BLD AUTO: 3.68 MILL/MM3 (ref 4.2–5.4)
SODIUM SERPL-SCNC: 133 MEQ/L (ref 135–145)
VANCOMYCIN SERPL-MCNC: 25.1 UG/ML (ref 0.1–39.9)
WBC # BLD AUTO: 7.8 THOU/MM3 (ref 4.8–10.8)

## 2024-09-12 PROCEDURE — 99233 SBSQ HOSP IP/OBS HIGH 50: CPT | Performed by: STUDENT IN AN ORGANIZED HEALTH CARE EDUCATION/TRAINING PROGRAM

## 2024-09-12 PROCEDURE — 6360000002 HC RX W HCPCS: Performed by: STUDENT IN AN ORGANIZED HEALTH CARE EDUCATION/TRAINING PROGRAM

## 2024-09-12 PROCEDURE — 85025 COMPLETE CBC W/AUTO DIFF WBC: CPT

## 2024-09-12 PROCEDURE — 6370000000 HC RX 637 (ALT 250 FOR IP): Performed by: PHYSICIAN ASSISTANT

## 2024-09-12 PROCEDURE — 36415 COLL VENOUS BLD VENIPUNCTURE: CPT

## 2024-09-12 PROCEDURE — 99231 SBSQ HOSP IP/OBS SF/LOW 25: CPT | Performed by: UROLOGY

## 2024-09-12 PROCEDURE — 93010 ELECTROCARDIOGRAM REPORT: CPT | Performed by: NUCLEAR MEDICINE

## 2024-09-12 PROCEDURE — 6360000002 HC RX W HCPCS

## 2024-09-12 PROCEDURE — 6360000002 HC RX W HCPCS: Performed by: PHYSICIAN ASSISTANT

## 2024-09-12 PROCEDURE — 80202 ASSAY OF VANCOMYCIN: CPT

## 2024-09-12 PROCEDURE — 82948 REAGENT STRIP/BLOOD GLUCOSE: CPT

## 2024-09-12 PROCEDURE — 97116 GAIT TRAINING THERAPY: CPT

## 2024-09-12 PROCEDURE — 1200000000 HC SEMI PRIVATE

## 2024-09-12 PROCEDURE — 2580000003 HC RX 258: Performed by: PHYSICIAN ASSISTANT

## 2024-09-12 PROCEDURE — 97535 SELF CARE MNGMENT TRAINING: CPT

## 2024-09-12 PROCEDURE — 97162 PT EVAL MOD COMPLEX 30 MIN: CPT

## 2024-09-12 PROCEDURE — 97530 THERAPEUTIC ACTIVITIES: CPT

## 2024-09-12 PROCEDURE — 97166 OT EVAL MOD COMPLEX 45 MIN: CPT

## 2024-09-12 PROCEDURE — 80048 BASIC METABOLIC PNL TOTAL CA: CPT

## 2024-09-12 PROCEDURE — 6370000000 HC RX 637 (ALT 250 FOR IP): Performed by: STUDENT IN AN ORGANIZED HEALTH CARE EDUCATION/TRAINING PROGRAM

## 2024-09-12 PROCEDURE — 2580000003 HC RX 258: Performed by: STUDENT IN AN ORGANIZED HEALTH CARE EDUCATION/TRAINING PROGRAM

## 2024-09-12 RX ORDER — LANOLIN ALCOHOL/MO/W.PET/CERES
3 CREAM (GRAM) TOPICAL NIGHTLY
Status: DISCONTINUED | OUTPATIENT
Start: 2024-09-12 | End: 2024-09-15 | Stop reason: HOSPADM

## 2024-09-12 RX ADMIN — PIOGLITAZONE 15 MG: 15 TABLET ORAL at 08:46

## 2024-09-12 RX ADMIN — LEVOTHYROXINE SODIUM 75 MCG: 0.07 TABLET ORAL at 06:12

## 2024-09-12 RX ADMIN — PIPERACILLIN AND TAZOBACTAM 3375 MG: 3; .375 INJECTION, POWDER, FOR SOLUTION INTRAVENOUS at 12:55

## 2024-09-12 RX ADMIN — BUPROPION HYDROCHLORIDE 300 MG: 300 TABLET, EXTENDED RELEASE ORAL at 08:42

## 2024-09-12 RX ADMIN — HYDROMORPHONE HYDROCHLORIDE 0.25 MG: 1 INJECTION, SOLUTION INTRAMUSCULAR; INTRAVENOUS; SUBCUTANEOUS at 16:19

## 2024-09-12 RX ADMIN — HYDROMORPHONE HYDROCHLORIDE 0.25 MG: 1 INJECTION, SOLUTION INTRAMUSCULAR; INTRAVENOUS; SUBCUTANEOUS at 11:33

## 2024-09-12 RX ADMIN — HYDROCODONE BITARTRATE AND ACETAMINOPHEN 2 TABLET: 5; 325 TABLET ORAL at 08:42

## 2024-09-12 RX ADMIN — INSULIN LISPRO 1 UNITS: 100 INJECTION, SOLUTION INTRAVENOUS; SUBCUTANEOUS at 21:51

## 2024-09-12 RX ADMIN — HYDROMORPHONE HYDROCHLORIDE 0.25 MG: 1 INJECTION, SOLUTION INTRAMUSCULAR; INTRAVENOUS; SUBCUTANEOUS at 21:39

## 2024-09-12 RX ADMIN — Medication 3 MG: at 21:40

## 2024-09-12 RX ADMIN — PIPERACILLIN AND TAZOBACTAM 3375 MG: 3; .375 INJECTION, POWDER, FOR SOLUTION INTRAVENOUS at 04:09

## 2024-09-12 RX ADMIN — PIPERACILLIN AND TAZOBACTAM 3375 MG: 3; .375 INJECTION, POWDER, FOR SOLUTION INTRAVENOUS at 21:42

## 2024-09-12 RX ADMIN — Medication 1500 MG: at 08:50

## 2024-09-12 RX ADMIN — ENOXAPARIN SODIUM 40 MG: 100 INJECTION SUBCUTANEOUS at 08:42

## 2024-09-12 RX ADMIN — HYDROCODONE BITARTRATE AND ACETAMINOPHEN 2 TABLET: 5; 325 TABLET ORAL at 13:09

## 2024-09-12 RX ADMIN — SODIUM CHLORIDE, PRESERVATIVE FREE 10 ML: 5 INJECTION INTRAVENOUS at 21:39

## 2024-09-12 RX ADMIN — QUETIAPINE FUMARATE 350 MG: 100 TABLET ORAL at 21:40

## 2024-09-12 RX ADMIN — ATORVASTATIN CALCIUM 40 MG: 40 TABLET, FILM COATED ORAL at 21:40

## 2024-09-12 RX ADMIN — TRAZODONE HYDROCHLORIDE 200 MG: 100 TABLET ORAL at 21:40

## 2024-09-12 RX ADMIN — PANTOPRAZOLE SODIUM 40 MG: 40 TABLET, DELAYED RELEASE ORAL at 06:11

## 2024-09-12 RX ADMIN — PANTOPRAZOLE SODIUM 40 MG: 40 TABLET, DELAYED RELEASE ORAL at 16:19

## 2024-09-12 RX ADMIN — CARVEDILOL 3.12 MG: 3.12 TABLET, FILM COATED ORAL at 21:40

## 2024-09-12 RX ADMIN — CARVEDILOL 3.12 MG: 3.12 TABLET, FILM COATED ORAL at 08:41

## 2024-09-12 RX ADMIN — ESCITALOPRAM OXALATE 20 MG: 20 TABLET ORAL at 08:41

## 2024-09-12 ASSESSMENT — PAIN DESCRIPTION - DESCRIPTORS
DESCRIPTORS: ACHING;DISCOMFORT;SHARP
DESCRIPTORS: ACHING
DESCRIPTORS: ACHING;SHARP
DESCRIPTORS: ACHING;SHARP
DESCRIPTORS: ACHING
DESCRIPTORS: ACHING

## 2024-09-12 ASSESSMENT — PAIN SCALES - WONG BAKER
WONGBAKER_NUMERICALRESPONSE: HURTS A LITTLE BIT

## 2024-09-12 ASSESSMENT — PAIN DESCRIPTION - ORIENTATION
ORIENTATION: MID
ORIENTATION: LEFT
ORIENTATION: LEFT
ORIENTATION: MID
ORIENTATION: MID;RIGHT
ORIENTATION: MID

## 2024-09-12 ASSESSMENT — PAIN SCALES - GENERAL
PAINLEVEL_OUTOF10: 5
PAINLEVEL_OUTOF10: 5
PAINLEVEL_OUTOF10: 8
PAINLEVEL_OUTOF10: 3
PAINLEVEL_OUTOF10: 9
PAINLEVEL_OUTOF10: 8
PAINLEVEL_OUTOF10: 8
PAINLEVEL_OUTOF10: 9
PAINLEVEL_OUTOF10: 8
PAINLEVEL_OUTOF10: 5
PAINLEVEL_OUTOF10: 5
PAINLEVEL_OUTOF10: 10

## 2024-09-12 ASSESSMENT — PAIN - FUNCTIONAL ASSESSMENT
PAIN_FUNCTIONAL_ASSESSMENT: ACTIVITIES ARE NOT PREVENTED
PAIN_FUNCTIONAL_ASSESSMENT: PREVENTS OR INTERFERES SOME ACTIVE ACTIVITIES AND ADLS
PAIN_FUNCTIONAL_ASSESSMENT: ACTIVITIES ARE NOT PREVENTED
PAIN_FUNCTIONAL_ASSESSMENT: ACTIVITIES ARE NOT PREVENTED

## 2024-09-12 ASSESSMENT — PAIN DESCRIPTION - LOCATION
LOCATION: ABDOMEN
LOCATION: ABDOMEN
LOCATION: ABDOMEN;BACK
LOCATION: FLANK;BACK;LEG
LOCATION: ABDOMEN
LOCATION: ABDOMEN

## 2024-09-12 ASSESSMENT — PAIN DESCRIPTION - PAIN TYPE: TYPE: ACUTE PAIN

## 2024-09-13 LAB
ANION GAP SERPL CALC-SCNC: 11 MEQ/L (ref 8–16)
BASOPHILS ABSOLUTE: 0 THOU/MM3 (ref 0–0.1)
BASOPHILS NFR BLD AUTO: 0.7 %
BUN SERPL-MCNC: 15 MG/DL (ref 7–22)
CALCIUM SERPL-MCNC: 9.9 MG/DL (ref 8.5–10.5)
CHLORIDE SERPL-SCNC: 103 MEQ/L (ref 98–111)
CO2 SERPL-SCNC: 23 MEQ/L (ref 23–33)
CREAT SERPL-MCNC: 0.7 MG/DL (ref 0.4–1.2)
DEPRECATED RDW RBC AUTO: 47.2 FL (ref 35–45)
EOSINOPHIL NFR BLD AUTO: 2.4 %
EOSINOPHILS ABSOLUTE: 0.1 THOU/MM3 (ref 0–0.4)
ERYTHROCYTE [DISTWIDTH] IN BLOOD BY AUTOMATED COUNT: 14.6 % (ref 11.5–14.5)
FERRITIN SERPL IA-MCNC: 1095 NG/ML (ref 10–291)
FOLATE SERPL-MCNC: 15.3 NG/ML (ref 4.8–24.2)
GFR SERPL CREATININE-BSD FRML MDRD: > 90 ML/MIN/1.73M2
GLUCOSE BLD STRIP.AUTO-MCNC: 120 MG/DL (ref 70–108)
GLUCOSE BLD STRIP.AUTO-MCNC: 134 MG/DL (ref 70–108)
GLUCOSE BLD STRIP.AUTO-MCNC: 145 MG/DL (ref 70–108)
GLUCOSE BLD STRIP.AUTO-MCNC: 163 MG/DL (ref 70–108)
GLUCOSE SERPL-MCNC: 122 MG/DL (ref 70–108)
HCT VFR BLD AUTO: 32.4 % (ref 37–47)
HGB BLD-MCNC: 10.1 GM/DL (ref 12–16)
HGB RETIC QN AUTO: 24.4 PG (ref 28.2–35.7)
IMM GRANULOCYTES # BLD AUTO: 0.09 THOU/MM3 (ref 0–0.07)
IMM GRANULOCYTES NFR BLD AUTO: 1.5 %
IMM RETICS NFR: 27.3 % (ref 3–15.9)
IRON SATN MFR SERPL: 21 % (ref 20–50)
IRON SERPL-MCNC: 34 UG/DL (ref 50–170)
LYMPHOCYTES ABSOLUTE: 1.6 THOU/MM3 (ref 1–4.8)
LYMPHOCYTES NFR BLD AUTO: 27.5 %
MCH RBC QN AUTO: 27.3 PG (ref 26–33)
MCHC RBC AUTO-ENTMCNC: 31.2 GM/DL (ref 32.2–35.5)
MCV RBC AUTO: 87.6 FL (ref 81–99)
MONOCYTES ABSOLUTE: 0.5 THOU/MM3 (ref 0.4–1.3)
MONOCYTES NFR BLD AUTO: 7.8 %
NEUTROPHILS ABSOLUTE: 3.5 THOU/MM3 (ref 1.8–7.7)
NEUTROPHILS NFR BLD AUTO: 60.1 %
NRBC BLD AUTO-RTO: 0 /100 WBC
PLATELET # BLD AUTO: 468 THOU/MM3 (ref 130–400)
PMV BLD AUTO: 8.5 FL (ref 9.4–12.4)
POTASSIUM SERPL-SCNC: 4.5 MEQ/L (ref 3.5–5.2)
RBC # BLD AUTO: 3.7 MILL/MM3 (ref 4.2–5.4)
RETICS # AUTO: 47 THOU/MM3 (ref 20–115)
RETICS/RBC NFR AUTO: 1.3 % (ref 0.5–2)
SODIUM SERPL-SCNC: 137 MEQ/L (ref 135–145)
TIBC SERPL-MCNC: 165 UG/DL (ref 171–450)
VIT B12 SERPL-MCNC: 374 PG/ML (ref 211–911)
WBC # BLD AUTO: 5.9 THOU/MM3 (ref 4.8–10.8)

## 2024-09-13 PROCEDURE — 87040 BLOOD CULTURE FOR BACTERIA: CPT

## 2024-09-13 PROCEDURE — 1200000000 HC SEMI PRIVATE

## 2024-09-13 PROCEDURE — 97530 THERAPEUTIC ACTIVITIES: CPT

## 2024-09-13 PROCEDURE — 97116 GAIT TRAINING THERAPY: CPT

## 2024-09-13 PROCEDURE — 85046 RETICYTE/HGB CONCENTRATE: CPT

## 2024-09-13 PROCEDURE — 2580000003 HC RX 258: Performed by: PHYSICIAN ASSISTANT

## 2024-09-13 PROCEDURE — 82728 ASSAY OF FERRITIN: CPT

## 2024-09-13 PROCEDURE — 6370000000 HC RX 637 (ALT 250 FOR IP): Performed by: PHYSICIAN ASSISTANT

## 2024-09-13 PROCEDURE — 83540 ASSAY OF IRON: CPT

## 2024-09-13 PROCEDURE — 99233 SBSQ HOSP IP/OBS HIGH 50: CPT | Performed by: STUDENT IN AN ORGANIZED HEALTH CARE EDUCATION/TRAINING PROGRAM

## 2024-09-13 PROCEDURE — 6360000002 HC RX W HCPCS: Performed by: PHYSICIAN ASSISTANT

## 2024-09-13 PROCEDURE — 85025 COMPLETE CBC W/AUTO DIFF WBC: CPT

## 2024-09-13 PROCEDURE — 97110 THERAPEUTIC EXERCISES: CPT

## 2024-09-13 PROCEDURE — 36415 COLL VENOUS BLD VENIPUNCTURE: CPT

## 2024-09-13 PROCEDURE — 2580000003 HC RX 258: Performed by: STUDENT IN AN ORGANIZED HEALTH CARE EDUCATION/TRAINING PROGRAM

## 2024-09-13 PROCEDURE — 6360000002 HC RX W HCPCS: Performed by: STUDENT IN AN ORGANIZED HEALTH CARE EDUCATION/TRAINING PROGRAM

## 2024-09-13 PROCEDURE — 6370000000 HC RX 637 (ALT 250 FOR IP): Performed by: STUDENT IN AN ORGANIZED HEALTH CARE EDUCATION/TRAINING PROGRAM

## 2024-09-13 PROCEDURE — 82948 REAGENT STRIP/BLOOD GLUCOSE: CPT

## 2024-09-13 PROCEDURE — 83550 IRON BINDING TEST: CPT

## 2024-09-13 PROCEDURE — 82746 ASSAY OF FOLIC ACID SERUM: CPT

## 2024-09-13 PROCEDURE — 80048 BASIC METABOLIC PNL TOTAL CA: CPT

## 2024-09-13 PROCEDURE — 82607 VITAMIN B-12: CPT

## 2024-09-13 RX ADMIN — SODIUM CHLORIDE, PRESERVATIVE FREE 10 ML: 5 INJECTION INTRAVENOUS at 20:42

## 2024-09-13 RX ADMIN — HYDROCODONE BITARTRATE AND ACETAMINOPHEN 2 TABLET: 5; 325 TABLET ORAL at 08:30

## 2024-09-13 RX ADMIN — PANTOPRAZOLE SODIUM 40 MG: 40 TABLET, DELAYED RELEASE ORAL at 15:54

## 2024-09-13 RX ADMIN — CARVEDILOL 3.12 MG: 3.12 TABLET, FILM COATED ORAL at 08:30

## 2024-09-13 RX ADMIN — ENOXAPARIN SODIUM 40 MG: 100 INJECTION SUBCUTANEOUS at 08:30

## 2024-09-13 RX ADMIN — Medication 3 MG: at 20:42

## 2024-09-13 RX ADMIN — HYDROCODONE BITARTRATE AND ACETAMINOPHEN 2 TABLET: 5; 325 TABLET ORAL at 12:51

## 2024-09-13 RX ADMIN — TRAZODONE HYDROCHLORIDE 200 MG: 100 TABLET ORAL at 20:42

## 2024-09-13 RX ADMIN — PIPERACILLIN AND TAZOBACTAM 3375 MG: 3; .375 INJECTION, POWDER, FOR SOLUTION INTRAVENOUS at 04:15

## 2024-09-13 RX ADMIN — PIPERACILLIN AND TAZOBACTAM 3375 MG: 3; .375 INJECTION, POWDER, FOR SOLUTION INTRAVENOUS at 20:43

## 2024-09-13 RX ADMIN — SODIUM CHLORIDE: 9 INJECTION, SOLUTION INTRAVENOUS at 20:38

## 2024-09-13 RX ADMIN — PIPERACILLIN AND TAZOBACTAM 3375 MG: 3; .375 INJECTION, POWDER, FOR SOLUTION INTRAVENOUS at 12:45

## 2024-09-13 RX ADMIN — LEVOTHYROXINE SODIUM 75 MCG: 0.07 TABLET ORAL at 06:04

## 2024-09-13 RX ADMIN — HYDROMORPHONE HYDROCHLORIDE 0.25 MG: 1 INJECTION, SOLUTION INTRAMUSCULAR; INTRAVENOUS; SUBCUTANEOUS at 20:47

## 2024-09-13 RX ADMIN — HYDROCODONE BITARTRATE AND ACETAMINOPHEN 2 TABLET: 5; 325 TABLET ORAL at 17:37

## 2024-09-13 RX ADMIN — PANTOPRAZOLE SODIUM 40 MG: 40 TABLET, DELAYED RELEASE ORAL at 06:04

## 2024-09-13 RX ADMIN — PIOGLITAZONE 15 MG: 15 TABLET ORAL at 08:30

## 2024-09-13 RX ADMIN — ATORVASTATIN CALCIUM 40 MG: 40 TABLET, FILM COATED ORAL at 20:42

## 2024-09-13 RX ADMIN — BUPROPION HYDROCHLORIDE 300 MG: 300 TABLET, EXTENDED RELEASE ORAL at 08:30

## 2024-09-13 RX ADMIN — ESCITALOPRAM OXALATE 20 MG: 20 TABLET ORAL at 08:30

## 2024-09-13 RX ADMIN — QUETIAPINE FUMARATE 350 MG: 100 TABLET ORAL at 20:42

## 2024-09-13 RX ADMIN — CARVEDILOL 3.12 MG: 3.12 TABLET, FILM COATED ORAL at 20:42

## 2024-09-13 RX ADMIN — VANCOMYCIN HYDROCHLORIDE 1250 MG: 5 INJECTION, POWDER, LYOPHILIZED, FOR SOLUTION INTRAVENOUS at 02:07

## 2024-09-13 RX ADMIN — VANCOMYCIN HYDROCHLORIDE 1250 MG: 5 INJECTION, POWDER, LYOPHILIZED, FOR SOLUTION INTRAVENOUS at 22:56

## 2024-09-13 ASSESSMENT — PAIN DESCRIPTION - DESCRIPTORS
DESCRIPTORS: SHARP
DESCRIPTORS: ACHING;SHARP
DESCRIPTORS: ACHING;SHARP

## 2024-09-13 ASSESSMENT — PAIN - FUNCTIONAL ASSESSMENT
PAIN_FUNCTIONAL_ASSESSMENT: ACTIVITIES ARE NOT PREVENTED

## 2024-09-13 ASSESSMENT — PAIN DESCRIPTION - ORIENTATION
ORIENTATION: LEFT

## 2024-09-13 ASSESSMENT — PAIN DESCRIPTION - PAIN TYPE: TYPE: ACUTE PAIN

## 2024-09-13 ASSESSMENT — PAIN SCALES - GENERAL
PAINLEVEL_OUTOF10: 10
PAINLEVEL_OUTOF10: 9
PAINLEVEL_OUTOF10: 8
PAINLEVEL_OUTOF10: 8
PAINLEVEL_OUTOF10: 7

## 2024-09-13 ASSESSMENT — PAIN SCALES - WONG BAKER: WONGBAKER_NUMERICALRESPONSE: HURTS A LITTLE BIT

## 2024-09-13 ASSESSMENT — PAIN DESCRIPTION - LOCATION
LOCATION: ABDOMEN

## 2024-09-14 ENCOUNTER — APPOINTMENT (OUTPATIENT)
Dept: CT IMAGING | Age: 69
DRG: 871 | End: 2024-09-14
Payer: MEDICARE

## 2024-09-14 LAB
ANION GAP SERPL CALC-SCNC: 8 MEQ/L (ref 8–16)
BASOPHILS ABSOLUTE: 0 THOU/MM3 (ref 0–0.1)
BASOPHILS NFR BLD AUTO: 0.9 %
BUN SERPL-MCNC: 17 MG/DL (ref 7–22)
CALCIUM SERPL-MCNC: 10 MG/DL (ref 8.5–10.5)
CHLORIDE SERPL-SCNC: 100 MEQ/L (ref 98–111)
CO2 SERPL-SCNC: 27 MEQ/L (ref 23–33)
CREAT SERPL-MCNC: 0.8 MG/DL (ref 0.4–1.2)
DEPRECATED RDW RBC AUTO: 47.1 FL (ref 35–45)
EOSINOPHIL NFR BLD AUTO: 2.4 %
EOSINOPHILS ABSOLUTE: 0.1 THOU/MM3 (ref 0–0.4)
ERYTHROCYTE [DISTWIDTH] IN BLOOD BY AUTOMATED COUNT: 14.6 % (ref 11.5–14.5)
GFR SERPL CREATININE-BSD FRML MDRD: 81 ML/MIN/1.73M2
GLUCOSE BLD STRIP.AUTO-MCNC: 112 MG/DL (ref 70–108)
GLUCOSE BLD STRIP.AUTO-MCNC: 138 MG/DL (ref 70–108)
GLUCOSE BLD STRIP.AUTO-MCNC: 149 MG/DL (ref 70–108)
GLUCOSE BLD STRIP.AUTO-MCNC: 157 MG/DL (ref 70–108)
GLUCOSE BLD STRIP.AUTO-MCNC: 189 MG/DL (ref 70–108)
GLUCOSE SERPL-MCNC: 185 MG/DL (ref 70–108)
HCT VFR BLD AUTO: 32.7 % (ref 37–47)
HGB BLD-MCNC: 9.9 GM/DL (ref 12–16)
IMM GRANULOCYTES # BLD AUTO: 0.18 THOU/MM3 (ref 0–0.07)
IMM GRANULOCYTES NFR BLD AUTO: 3.3 %
LYMPHOCYTES ABSOLUTE: 1.9 THOU/MM3 (ref 1–4.8)
LYMPHOCYTES NFR BLD AUTO: 35.1 %
MCH RBC QN AUTO: 26.8 PG (ref 26–33)
MCHC RBC AUTO-ENTMCNC: 30.3 GM/DL (ref 32.2–35.5)
MCV RBC AUTO: 88.4 FL (ref 81–99)
MONOCYTES ABSOLUTE: 0.5 THOU/MM3 (ref 0.4–1.3)
MONOCYTES NFR BLD AUTO: 9.7 %
NEUTROPHILS ABSOLUTE: 2.6 THOU/MM3 (ref 1.8–7.7)
NEUTROPHILS NFR BLD AUTO: 48.6 %
NRBC BLD AUTO-RTO: 0 /100 WBC
PLATELET # BLD AUTO: 466 THOU/MM3 (ref 130–400)
PMV BLD AUTO: 8.5 FL (ref 9.4–12.4)
POTASSIUM SERPL-SCNC: 4.8 MEQ/L (ref 3.5–5.2)
RBC # BLD AUTO: 3.7 MILL/MM3 (ref 4.2–5.4)
SODIUM SERPL-SCNC: 135 MEQ/L (ref 135–145)
WBC # BLD AUTO: 5.4 THOU/MM3 (ref 4.8–10.8)

## 2024-09-14 PROCEDURE — 2580000003 HC RX 258: Performed by: PHYSICIAN ASSISTANT

## 2024-09-14 PROCEDURE — 74177 CT ABD & PELVIS W/CONTRAST: CPT

## 2024-09-14 PROCEDURE — 36415 COLL VENOUS BLD VENIPUNCTURE: CPT

## 2024-09-14 PROCEDURE — 85025 COMPLETE CBC W/AUTO DIFF WBC: CPT

## 2024-09-14 PROCEDURE — 6360000002 HC RX W HCPCS: Performed by: PHYSICIAN ASSISTANT

## 2024-09-14 PROCEDURE — 6360000004 HC RX CONTRAST MEDICATION: Performed by: STUDENT IN AN ORGANIZED HEALTH CARE EDUCATION/TRAINING PROGRAM

## 2024-09-14 PROCEDURE — 80048 BASIC METABOLIC PNL TOTAL CA: CPT

## 2024-09-14 PROCEDURE — 2580000003 HC RX 258: Performed by: STUDENT IN AN ORGANIZED HEALTH CARE EDUCATION/TRAINING PROGRAM

## 2024-09-14 PROCEDURE — 6370000000 HC RX 637 (ALT 250 FOR IP): Performed by: PHYSICIAN ASSISTANT

## 2024-09-14 PROCEDURE — 6370000000 HC RX 637 (ALT 250 FOR IP): Performed by: STUDENT IN AN ORGANIZED HEALTH CARE EDUCATION/TRAINING PROGRAM

## 2024-09-14 PROCEDURE — 1200000000 HC SEMI PRIVATE

## 2024-09-14 PROCEDURE — 82948 REAGENT STRIP/BLOOD GLUCOSE: CPT

## 2024-09-14 PROCEDURE — 99233 SBSQ HOSP IP/OBS HIGH 50: CPT | Performed by: STUDENT IN AN ORGANIZED HEALTH CARE EDUCATION/TRAINING PROGRAM

## 2024-09-14 PROCEDURE — 6360000002 HC RX W HCPCS: Performed by: STUDENT IN AN ORGANIZED HEALTH CARE EDUCATION/TRAINING PROGRAM

## 2024-09-14 RX ORDER — IOPAMIDOL 755 MG/ML
80 INJECTION, SOLUTION INTRAVASCULAR
Status: COMPLETED | OUTPATIENT
Start: 2024-09-14 | End: 2024-09-14

## 2024-09-14 RX ADMIN — PIOGLITAZONE 15 MG: 15 TABLET ORAL at 10:45

## 2024-09-14 RX ADMIN — ESCITALOPRAM OXALATE 20 MG: 20 TABLET ORAL at 10:46

## 2024-09-14 RX ADMIN — ATORVASTATIN CALCIUM 40 MG: 40 TABLET, FILM COATED ORAL at 22:31

## 2024-09-14 RX ADMIN — IOPAMIDOL 80 ML: 755 INJECTION, SOLUTION INTRAVENOUS at 16:05

## 2024-09-14 RX ADMIN — BUPROPION HYDROCHLORIDE 300 MG: 300 TABLET, EXTENDED RELEASE ORAL at 10:46

## 2024-09-14 RX ADMIN — PIPERACILLIN AND TAZOBACTAM 3375 MG: 3; .375 INJECTION, POWDER, FOR SOLUTION INTRAVENOUS at 22:48

## 2024-09-14 RX ADMIN — HYDROMORPHONE HYDROCHLORIDE 0.25 MG: 1 INJECTION, SOLUTION INTRAMUSCULAR; INTRAVENOUS; SUBCUTANEOUS at 10:57

## 2024-09-14 RX ADMIN — Medication 3 MG: at 22:31

## 2024-09-14 RX ADMIN — PANTOPRAZOLE SODIUM 40 MG: 40 TABLET, DELAYED RELEASE ORAL at 15:28

## 2024-09-14 RX ADMIN — HYDROMORPHONE HYDROCHLORIDE 0.25 MG: 1 INJECTION, SOLUTION INTRAMUSCULAR; INTRAVENOUS; SUBCUTANEOUS at 12:45

## 2024-09-14 RX ADMIN — FLUTICASONE PROPIONATE 1 SPRAY: 50 SPRAY, METERED NASAL at 08:09

## 2024-09-14 RX ADMIN — LEVOTHYROXINE SODIUM 75 MCG: 0.07 TABLET ORAL at 05:33

## 2024-09-14 RX ADMIN — PANTOPRAZOLE SODIUM 40 MG: 40 TABLET, DELAYED RELEASE ORAL at 05:33

## 2024-09-14 RX ADMIN — HYDROCODONE BITARTRATE AND ACETAMINOPHEN 2 TABLET: 5; 325 TABLET ORAL at 22:31

## 2024-09-14 RX ADMIN — CARVEDILOL 3.12 MG: 3.12 TABLET, FILM COATED ORAL at 22:32

## 2024-09-14 RX ADMIN — TRAZODONE HYDROCHLORIDE 200 MG: 100 TABLET ORAL at 22:31

## 2024-09-14 RX ADMIN — HYDROCODONE BITARTRATE AND ACETAMINOPHEN 2 TABLET: 5; 325 TABLET ORAL at 15:30

## 2024-09-14 RX ADMIN — SODIUM CHLORIDE, PRESERVATIVE FREE 10 ML: 5 INJECTION INTRAVENOUS at 21:00

## 2024-09-14 RX ADMIN — QUETIAPINE FUMARATE 350 MG: 100 TABLET ORAL at 22:31

## 2024-09-14 RX ADMIN — SODIUM CHLORIDE, PRESERVATIVE FREE 10 ML: 5 INJECTION INTRAVENOUS at 08:09

## 2024-09-14 RX ADMIN — ENOXAPARIN SODIUM 40 MG: 100 INJECTION SUBCUTANEOUS at 08:07

## 2024-09-14 RX ADMIN — VANCOMYCIN HYDROCHLORIDE 1250 MG: 5 INJECTION, POWDER, LYOPHILIZED, FOR SOLUTION INTRAVENOUS at 17:25

## 2024-09-14 RX ADMIN — HYDROCODONE BITARTRATE AND ACETAMINOPHEN 2 TABLET: 5; 325 TABLET ORAL at 08:06

## 2024-09-14 RX ADMIN — CARVEDILOL 3.12 MG: 3.12 TABLET, FILM COATED ORAL at 08:11

## 2024-09-14 RX ADMIN — PIPERACILLIN AND TAZOBACTAM 3375 MG: 3; .375 INJECTION, POWDER, FOR SOLUTION INTRAVENOUS at 12:50

## 2024-09-14 RX ADMIN — PIPERACILLIN AND TAZOBACTAM 3375 MG: 3; .375 INJECTION, POWDER, FOR SOLUTION INTRAVENOUS at 03:41

## 2024-09-14 ASSESSMENT — PAIN SCALES - WONG BAKER: WONGBAKER_NUMERICALRESPONSE: NO HURT

## 2024-09-14 ASSESSMENT — PAIN DESCRIPTION - PAIN TYPE
TYPE: SURGICAL PAIN

## 2024-09-14 ASSESSMENT — PAIN DESCRIPTION - DESCRIPTORS
DESCRIPTORS: ACHING;DISCOMFORT;SORE
DESCRIPTORS: ACHING;SORE;TENDER
DESCRIPTORS: ACHING;DISCOMFORT;TENDER
DESCRIPTORS: ACHING;DISCOMFORT;SORE
DESCRIPTORS: ACHING

## 2024-09-14 ASSESSMENT — PAIN DESCRIPTION - ONSET
ONSET: ON-GOING

## 2024-09-14 ASSESSMENT — PAIN DESCRIPTION - ORIENTATION
ORIENTATION: LEFT;MID
ORIENTATION: LEFT

## 2024-09-14 ASSESSMENT — PAIN SCALES - GENERAL
PAINLEVEL_OUTOF10: 8
PAINLEVEL_OUTOF10: 4
PAINLEVEL_OUTOF10: 9
PAINLEVEL_OUTOF10: 8
PAINLEVEL_OUTOF10: 9
PAINLEVEL_OUTOF10: 5
PAINLEVEL_OUTOF10: 10
PAINLEVEL_OUTOF10: 9
PAINLEVEL_OUTOF10: 0

## 2024-09-14 ASSESSMENT — PAIN DESCRIPTION - LOCATION
LOCATION: ABDOMEN
LOCATION: ABDOMEN;FLANK
LOCATION: ABDOMEN;FLANK
LOCATION: FLANK;ABDOMEN
LOCATION: ABDOMEN

## 2024-09-14 ASSESSMENT — PAIN DESCRIPTION - FREQUENCY
FREQUENCY: CONTINUOUS

## 2024-09-15 VITALS
RESPIRATION RATE: 16 BRPM | DIASTOLIC BLOOD PRESSURE: 86 MMHG | OXYGEN SATURATION: 100 % | TEMPERATURE: 97.4 F | BODY MASS INDEX: 28.51 KG/M2 | HEIGHT: 64 IN | SYSTOLIC BLOOD PRESSURE: 134 MMHG | WEIGHT: 167 LBS | HEART RATE: 60 BPM

## 2024-09-15 LAB
ANION GAP SERPL CALC-SCNC: 11 MEQ/L (ref 8–16)
BASOPHILS ABSOLUTE: 0.1 THOU/MM3 (ref 0–0.1)
BASOPHILS NFR BLD AUTO: 0.8 %
BUN SERPL-MCNC: 19 MG/DL (ref 7–22)
CALCIUM SERPL-MCNC: 10 MG/DL (ref 8.5–10.5)
CHLORIDE SERPL-SCNC: 103 MEQ/L (ref 98–111)
CO2 SERPL-SCNC: 25 MEQ/L (ref 23–33)
CREAT SERPL-MCNC: 0.7 MG/DL (ref 0.4–1.2)
DEPRECATED RDW RBC AUTO: 48.8 FL (ref 35–45)
EOSINOPHIL NFR BLD AUTO: 2.2 %
EOSINOPHILS ABSOLUTE: 0.2 THOU/MM3 (ref 0–0.4)
ERYTHROCYTE [DISTWIDTH] IN BLOOD BY AUTOMATED COUNT: 14.7 % (ref 11.5–14.5)
GFR SERPL CREATININE-BSD FRML MDRD: > 90 ML/MIN/1.73M2
GLUCOSE BLD STRIP.AUTO-MCNC: 129 MG/DL (ref 70–108)
GLUCOSE BLD STRIP.AUTO-MCNC: 174 MG/DL (ref 70–108)
GLUCOSE SERPL-MCNC: 134 MG/DL (ref 70–108)
HCT VFR BLD AUTO: 34.6 % (ref 37–47)
HGB BLD-MCNC: 10.5 GM/DL (ref 12–16)
IMM GRANULOCYTES # BLD AUTO: 0.3 THOU/MM3 (ref 0–0.07)
IMM GRANULOCYTES NFR BLD AUTO: 4.2 %
LYMPHOCYTES ABSOLUTE: 2.3 THOU/MM3 (ref 1–4.8)
LYMPHOCYTES NFR BLD AUTO: 31.7 %
MCH RBC QN AUTO: 27.6 PG (ref 26–33)
MCHC RBC AUTO-ENTMCNC: 30.3 GM/DL (ref 32.2–35.5)
MCV RBC AUTO: 90.8 FL (ref 81–99)
MONOCYTES ABSOLUTE: 0.5 THOU/MM3 (ref 0.4–1.3)
MONOCYTES NFR BLD AUTO: 6.7 %
NEUTROPHILS ABSOLUTE: 3.9 THOU/MM3 (ref 1.8–7.7)
NEUTROPHILS NFR BLD AUTO: 54.4 %
NRBC BLD AUTO-RTO: 0 /100 WBC
PLATELET # BLD AUTO: 513 THOU/MM3 (ref 130–400)
PLATELET BLD QL SMEAR: ABNORMAL
PMV BLD AUTO: 8.6 FL (ref 9.4–12.4)
POLYCHROMASIA BLD QL SMEAR: ABNORMAL
POTASSIUM SERPL-SCNC: 4.6 MEQ/L (ref 3.5–5.2)
RBC # BLD AUTO: 3.81 MILL/MM3 (ref 4.2–5.4)
SCAN OF BLOOD SMEAR: NORMAL
SODIUM SERPL-SCNC: 139 MEQ/L (ref 135–145)
VANCOMYCIN SERPL-MCNC: 9.7 UG/ML (ref 0.1–39.9)
VARIANT LYMPHS BLD QL SMEAR: ABNORMAL %
WBC # BLD AUTO: 7.2 THOU/MM3 (ref 4.8–10.8)

## 2024-09-15 PROCEDURE — 82948 REAGENT STRIP/BLOOD GLUCOSE: CPT

## 2024-09-15 PROCEDURE — 6360000002 HC RX W HCPCS: Performed by: PHYSICIAN ASSISTANT

## 2024-09-15 PROCEDURE — 2580000003 HC RX 258: Performed by: STUDENT IN AN ORGANIZED HEALTH CARE EDUCATION/TRAINING PROGRAM

## 2024-09-15 PROCEDURE — 99239 HOSP IP/OBS DSCHRG MGMT >30: CPT | Performed by: STUDENT IN AN ORGANIZED HEALTH CARE EDUCATION/TRAINING PROGRAM

## 2024-09-15 PROCEDURE — 6360000002 HC RX W HCPCS: Performed by: STUDENT IN AN ORGANIZED HEALTH CARE EDUCATION/TRAINING PROGRAM

## 2024-09-15 PROCEDURE — 6360000002 HC RX W HCPCS: Performed by: PHARMACIST

## 2024-09-15 PROCEDURE — 2580000003 HC RX 258: Performed by: PHARMACIST

## 2024-09-15 PROCEDURE — 80202 ASSAY OF VANCOMYCIN: CPT

## 2024-09-15 PROCEDURE — 85025 COMPLETE CBC W/AUTO DIFF WBC: CPT

## 2024-09-15 PROCEDURE — 36415 COLL VENOUS BLD VENIPUNCTURE: CPT

## 2024-09-15 PROCEDURE — 6370000000 HC RX 637 (ALT 250 FOR IP): Performed by: STUDENT IN AN ORGANIZED HEALTH CARE EDUCATION/TRAINING PROGRAM

## 2024-09-15 PROCEDURE — 2580000003 HC RX 258: Performed by: PHYSICIAN ASSISTANT

## 2024-09-15 PROCEDURE — 6370000000 HC RX 637 (ALT 250 FOR IP): Performed by: PHYSICIAN ASSISTANT

## 2024-09-15 PROCEDURE — 80048 BASIC METABOLIC PNL TOTAL CA: CPT

## 2024-09-15 RX ORDER — LANOLIN ALCOHOL/MO/W.PET/CERES
3 CREAM (GRAM) TOPICAL NIGHTLY
Qty: 30 TABLET | Refills: 0
Start: 2024-09-15

## 2024-09-15 RX ADMIN — SODIUM CHLORIDE, PRESERVATIVE FREE 10 ML: 5 INJECTION INTRAVENOUS at 08:54

## 2024-09-15 RX ADMIN — BUPROPION HYDROCHLORIDE 300 MG: 300 TABLET, EXTENDED RELEASE ORAL at 08:47

## 2024-09-15 RX ADMIN — LEVOTHYROXINE SODIUM 75 MCG: 0.07 TABLET ORAL at 06:55

## 2024-09-15 RX ADMIN — PIPERACILLIN AND TAZOBACTAM 3375 MG: 3; .375 INJECTION, POWDER, FOR SOLUTION INTRAVENOUS at 04:17

## 2024-09-15 RX ADMIN — HYDROMORPHONE HYDROCHLORIDE 0.25 MG: 1 INJECTION, SOLUTION INTRAMUSCULAR; INTRAVENOUS; SUBCUTANEOUS at 02:12

## 2024-09-15 RX ADMIN — HYDROCODONE BITARTRATE AND ACETAMINOPHEN 2 TABLET: 5; 325 TABLET ORAL at 12:14

## 2024-09-15 RX ADMIN — VANCOMYCIN HYDROCHLORIDE 1250 MG: 5 INJECTION, POWDER, LYOPHILIZED, FOR SOLUTION INTRAVENOUS at 09:00

## 2024-09-15 RX ADMIN — PIPERACILLIN AND TAZOBACTAM 3375 MG: 3; .375 INJECTION, POWDER, FOR SOLUTION INTRAVENOUS at 12:25

## 2024-09-15 RX ADMIN — PIOGLITAZONE 15 MG: 15 TABLET ORAL at 08:47

## 2024-09-15 RX ADMIN — PANTOPRAZOLE SODIUM 40 MG: 40 TABLET, DELAYED RELEASE ORAL at 06:55

## 2024-09-15 RX ADMIN — CARVEDILOL 3.12 MG: 3.12 TABLET, FILM COATED ORAL at 08:47

## 2024-09-15 RX ADMIN — HYDROMORPHONE HYDROCHLORIDE 0.25 MG: 1 INJECTION, SOLUTION INTRAMUSCULAR; INTRAVENOUS; SUBCUTANEOUS at 08:51

## 2024-09-15 RX ADMIN — ENOXAPARIN SODIUM 40 MG: 100 INJECTION SUBCUTANEOUS at 08:52

## 2024-09-15 RX ADMIN — ESCITALOPRAM OXALATE 20 MG: 20 TABLET ORAL at 08:47

## 2024-09-15 RX ADMIN — FLUTICASONE PROPIONATE 1 SPRAY: 50 SPRAY, METERED NASAL at 08:47

## 2024-09-15 ASSESSMENT — PAIN SCALES - GENERAL
PAINLEVEL_OUTOF10: 0
PAINLEVEL_OUTOF10: 6
PAINLEVEL_OUTOF10: 0
PAINLEVEL_OUTOF10: 8
PAINLEVEL_OUTOF10: 5

## 2024-09-15 ASSESSMENT — PAIN DESCRIPTION - PAIN TYPE: TYPE: ACUTE PAIN;SURGICAL PAIN

## 2024-09-15 ASSESSMENT — PAIN SCALES - WONG BAKER
WONGBAKER_NUMERICALRESPONSE: NO HURT
WONGBAKER_NUMERICALRESPONSE: NO HURT

## 2024-09-15 ASSESSMENT — PAIN DESCRIPTION - DESCRIPTORS
DESCRIPTORS: ACHING
DESCRIPTORS: DISCOMFORT;STABBING

## 2024-09-15 ASSESSMENT — PAIN DESCRIPTION - ORIENTATION
ORIENTATION: LEFT
ORIENTATION: LEFT

## 2024-09-15 ASSESSMENT — PAIN DESCRIPTION - LOCATION
LOCATION: ABDOMEN;FLANK
LOCATION: ABDOMEN
LOCATION: ABDOMEN;FLANK

## 2024-09-15 ASSESSMENT — PAIN DESCRIPTION - ONSET: ONSET: ON-GOING

## 2024-09-15 ASSESSMENT — PAIN DESCRIPTION - FREQUENCY: FREQUENCY: CONTINUOUS

## 2024-09-16 LAB
BACTERIA SPEC AEROBE CULT: NORMAL
BACTERIA SPEC ANAEROBE CULT: NORMAL
GRAM STN SPEC: NORMAL

## 2024-09-18 LAB
BACTERIA BLD AEROBE CULT: NORMAL
BACTERIA BLD AEROBE CULT: NORMAL

## 2024-10-04 ENCOUNTER — HOSPITAL ENCOUNTER (EMERGENCY)
Age: 69
Discharge: HOME | End: 2024-10-05
Payer: MEDICARE

## 2024-10-04 ENCOUNTER — APPOINTMENT (OUTPATIENT)
Dept: CT IMAGING | Age: 69
End: 2024-10-04
Payer: MEDICARE

## 2024-10-04 ENCOUNTER — APPOINTMENT (OUTPATIENT)
Dept: GENERAL RADIOLOGY | Age: 69
End: 2024-10-04
Payer: MEDICARE

## 2024-10-04 DIAGNOSIS — K52.9 GASTROENTERITIS: Primary | ICD-10-CM

## 2024-10-04 DIAGNOSIS — Z93.6 NEPHROSTOMY PRESENT (HCC): ICD-10-CM

## 2024-10-04 DIAGNOSIS — R68.89 MULTIPLE SOMATIC COMPLAINTS: ICD-10-CM

## 2024-10-04 DIAGNOSIS — R10.9 LEFT FLANK PAIN: ICD-10-CM

## 2024-10-04 DIAGNOSIS — N15.1 PERINEPHRIC ABSCESS: ICD-10-CM

## 2024-10-04 LAB
ALBUMIN SERPL BCG-MCNC: 3.9 G/DL (ref 3.5–5.1)
ALP SERPL-CCNC: 69 U/L (ref 38–126)
ALT SERPL W/O P-5'-P-CCNC: 35 U/L (ref 11–66)
ANION GAP SERPL CALC-SCNC: 14 MEQ/L (ref 8–16)
AST SERPL-CCNC: 31 U/L (ref 5–40)
BACTERIA URNS QL MICRO: ABNORMAL /HPF
BASOPHILS ABSOLUTE: 0 THOU/MM3 (ref 0–0.1)
BASOPHILS NFR BLD AUTO: 0.3 %
BILIRUB SERPL-MCNC: 0.3 MG/DL (ref 0.3–1.2)
BILIRUB UR QL STRIP.AUTO: NEGATIVE
BUN SERPL-MCNC: 16 MG/DL (ref 7–22)
CALCIUM SERPL-MCNC: 10.4 MG/DL (ref 8.5–10.5)
CASTS #/AREA URNS LPF: ABNORMAL /LPF
CASTS 2: ABNORMAL /LPF
CHARACTER UR: CLEAR
CHLORIDE SERPL-SCNC: 98 MEQ/L (ref 98–111)
CO2 SERPL-SCNC: 22 MEQ/L (ref 23–33)
COLOR, UA: YELLOW
CREAT SERPL-MCNC: 0.7 MG/DL (ref 0.4–1.2)
CRYSTALS URNS MICRO: ABNORMAL
DEPRECATED RDW RBC AUTO: 49.9 FL (ref 35–45)
EOSINOPHIL NFR BLD AUTO: 2.4 %
EOSINOPHILS ABSOLUTE: 0.2 THOU/MM3 (ref 0–0.4)
EPITHELIAL CELLS, UA: ABNORMAL /HPF
ERYTHROCYTE [DISTWIDTH] IN BLOOD BY AUTOMATED COUNT: 15.6 % (ref 11.5–14.5)
FLUAV RNA RESP QL NAA+PROBE: NOT DETECTED
FLUBV RNA RESP QL NAA+PROBE: NOT DETECTED
GFR SERPL CREATININE-BSD FRML MDRD: > 90 ML/MIN/1.73M2
GLUCOSE BLD STRIP.AUTO-MCNC: 299 MG/DL (ref 70–108)
GLUCOSE SERPL-MCNC: 296 MG/DL (ref 70–108)
GLUCOSE UR QL STRIP.AUTO: 500 MG/DL
HCT VFR BLD AUTO: 36.8 % (ref 37–47)
HGB BLD-MCNC: 11.5 GM/DL (ref 12–16)
HGB UR QL STRIP.AUTO: ABNORMAL
IMM GRANULOCYTES # BLD AUTO: 0.02 THOU/MM3 (ref 0–0.07)
IMM GRANULOCYTES NFR BLD AUTO: 0.3 %
KETONES UR QL STRIP.AUTO: NEGATIVE
LACTIC ACID, SEPSIS: 1.4 MMOL/L (ref 0.5–1.9)
LACTIC ACID, SEPSIS: 2 MMOL/L (ref 0.5–1.9)
LIPASE SERPL-CCNC: 45.9 U/L (ref 5.6–51.3)
LYMPHOCYTES ABSOLUTE: 1.9 THOU/MM3 (ref 1–4.8)
LYMPHOCYTES NFR BLD AUTO: 29.7 %
MCH RBC QN AUTO: 27.3 PG (ref 26–33)
MCHC RBC AUTO-ENTMCNC: 31.3 GM/DL (ref 32.2–35.5)
MCV RBC AUTO: 87.4 FL (ref 81–99)
MISCELLANEOUS 2: ABNORMAL
MONOCYTES ABSOLUTE: 0.5 THOU/MM3 (ref 0.4–1.3)
MONOCYTES NFR BLD AUTO: 7.8 %
NEUTROPHILS ABSOLUTE: 3.7 THOU/MM3 (ref 1.8–7.7)
NEUTROPHILS NFR BLD AUTO: 59.5 %
NITRITE UR QL STRIP: NEGATIVE
NRBC BLD AUTO-RTO: 0 /100 WBC
NT-PROBNP SERPL IA-MCNC: 291 PG/ML (ref 0–124)
OSMOLALITY SERPL CALC.SUM OF ELEC: 280.4 MOSMOL/KG (ref 275–300)
PH UR STRIP.AUTO: 6 [PH] (ref 5–9)
PLATELET # BLD AUTO: 218 THOU/MM3 (ref 130–400)
PMV BLD AUTO: 9.8 FL (ref 9.4–12.4)
POTASSIUM SERPL-SCNC: 4.4 MEQ/L (ref 3.5–5.2)
PROCALCITONIN SERPL IA-MCNC: 0.08 NG/ML (ref 0.01–0.09)
PROT SERPL-MCNC: 7.3 G/DL (ref 6.1–8)
PROT UR STRIP.AUTO-MCNC: NEGATIVE MG/DL
RBC # BLD AUTO: 4.21 MILL/MM3 (ref 4.2–5.4)
RBC URINE: ABNORMAL /HPF
RENAL EPI CELLS #/AREA URNS HPF: ABNORMAL /[HPF]
SARS-COV-2 RNA RESP QL NAA+PROBE: NOT DETECTED
SODIUM SERPL-SCNC: 134 MEQ/L (ref 135–145)
SP GR UR REFRACT.AUTO: 1.01 (ref 1–1.03)
TROPONIN, HIGH SENSITIVITY: 13 NG/L (ref 0–12)
UROBILINOGEN, URINE: 1 EU/DL (ref 0–1)
WBC # BLD AUTO: 6.3 THOU/MM3 (ref 4.8–10.8)
WBC #/AREA URNS HPF: ABNORMAL /HPF
WBC #/AREA URNS HPF: ABNORMAL /[HPF]
YEAST LIKE FUNGI URNS QL MICRO: ABNORMAL

## 2024-10-04 PROCEDURE — 36415 COLL VENOUS BLD VENIPUNCTURE: CPT

## 2024-10-04 PROCEDURE — 82948 REAGENT STRIP/BLOOD GLUCOSE: CPT

## 2024-10-04 PROCEDURE — 6360000004 HC RX CONTRAST MEDICATION: Performed by: PHYSICIAN ASSISTANT

## 2024-10-04 PROCEDURE — 87086 URINE CULTURE/COLONY COUNT: CPT

## 2024-10-04 PROCEDURE — 83690 ASSAY OF LIPASE: CPT

## 2024-10-04 PROCEDURE — 84145 PROCALCITONIN (PCT): CPT

## 2024-10-04 PROCEDURE — 96374 THER/PROPH/DIAG INJ IV PUSH: CPT

## 2024-10-04 PROCEDURE — 74177 CT ABD & PELVIS W/CONTRAST: CPT

## 2024-10-04 PROCEDURE — 93005 ELECTROCARDIOGRAM TRACING: CPT | Performed by: PHYSICIAN ASSISTANT

## 2024-10-04 PROCEDURE — 85025 COMPLETE CBC W/AUTO DIFF WBC: CPT

## 2024-10-04 PROCEDURE — 6360000002 HC RX W HCPCS: Performed by: PHYSICIAN ASSISTANT

## 2024-10-04 PROCEDURE — 81001 URINALYSIS AUTO W/SCOPE: CPT

## 2024-10-04 PROCEDURE — 87636 SARSCOV2 & INF A&B AMP PRB: CPT

## 2024-10-04 PROCEDURE — 83605 ASSAY OF LACTIC ACID: CPT

## 2024-10-04 PROCEDURE — 71045 X-RAY EXAM CHEST 1 VIEW: CPT

## 2024-10-04 PROCEDURE — 84484 ASSAY OF TROPONIN QUANT: CPT

## 2024-10-04 PROCEDURE — 83880 ASSAY OF NATRIURETIC PEPTIDE: CPT

## 2024-10-04 PROCEDURE — 99285 EMERGENCY DEPT VISIT HI MDM: CPT

## 2024-10-04 PROCEDURE — 80053 COMPREHEN METABOLIC PANEL: CPT

## 2024-10-04 RX ORDER — KETOROLAC TROMETHAMINE 30 MG/ML
15 INJECTION, SOLUTION INTRAMUSCULAR; INTRAVENOUS ONCE
Status: COMPLETED | OUTPATIENT
Start: 2024-10-04 | End: 2024-10-04

## 2024-10-04 RX ORDER — IOPAMIDOL 755 MG/ML
80 INJECTION, SOLUTION INTRAVASCULAR
Status: COMPLETED | OUTPATIENT
Start: 2024-10-04 | End: 2024-10-04

## 2024-10-04 RX ADMIN — KETOROLAC TROMETHAMINE 15 MG: 30 INJECTION, SOLUTION INTRAMUSCULAR at 21:23

## 2024-10-04 RX ADMIN — IOPAMIDOL 80 ML: 755 INJECTION, SOLUTION INTRAVENOUS at 22:13

## 2024-10-04 ASSESSMENT — PAIN SCALES - GENERAL
PAINLEVEL_OUTOF10: 8
PAINLEVEL_OUTOF10: 9

## 2024-10-04 ASSESSMENT — ENCOUNTER SYMPTOMS
VOMITING: 1
SHORTNESS OF BREATH: 0
NAUSEA: 1
SORE THROAT: 0
DIARRHEA: 1
BACK PAIN: 1
COUGH: 1
ABDOMINAL PAIN: 1
RHINORRHEA: 1

## 2024-10-04 ASSESSMENT — PAIN DESCRIPTION - DESCRIPTORS
DESCRIPTORS: ACHING
DESCRIPTORS: ACHING

## 2024-10-04 ASSESSMENT — PAIN DESCRIPTION - LOCATION
LOCATION: HEAD

## 2024-10-04 ASSESSMENT — PAIN - FUNCTIONAL ASSESSMENT
PAIN_FUNCTIONAL_ASSESSMENT: 0-10
PAIN_FUNCTIONAL_ASSESSMENT: 0-10

## 2024-10-05 ENCOUNTER — HOSPITAL ENCOUNTER (EMERGENCY)
Age: 69
Discharge: NURSING, REHAB OR LONG-TERM CARE | End: 2024-10-06
Attending: EMERGENCY MEDICINE
Payer: MEDICARE

## 2024-10-05 VITALS
SYSTOLIC BLOOD PRESSURE: 143 MMHG | HEIGHT: 64 IN | WEIGHT: 173 LBS | DIASTOLIC BLOOD PRESSURE: 82 MMHG | TEMPERATURE: 98.1 F | RESPIRATION RATE: 18 BRPM | OXYGEN SATURATION: 95 % | BODY MASS INDEX: 29.53 KG/M2 | HEART RATE: 71 BPM

## 2024-10-05 DIAGNOSIS — T83.022A NEPHROSTOMY TUBE DISPLACED (HCC): Primary | ICD-10-CM

## 2024-10-05 LAB
EKG ATRIAL RATE: 89 BPM
EKG P AXIS: 53 DEGREES
EKG P-R INTERVAL: 168 MS
EKG Q-T INTERVAL: 374 MS
EKG QRS DURATION: 86 MS
EKG QTC CALCULATION (BAZETT): 455 MS
EKG R AXIS: -12 DEGREES
EKG T AXIS: 79 DEGREES
EKG VENTRICULAR RATE: 89 BPM
GLUCOSE BLD STRIP.AUTO-MCNC: 209 MG/DL (ref 70–108)

## 2024-10-05 PROCEDURE — 93010 ELECTROCARDIOGRAM REPORT: CPT | Performed by: INTERNAL MEDICINE

## 2024-10-05 PROCEDURE — 99283 EMERGENCY DEPT VISIT LOW MDM: CPT

## 2024-10-05 PROCEDURE — 82948 REAGENT STRIP/BLOOD GLUCOSE: CPT

## 2024-10-05 RX ORDER — ONDANSETRON 4 MG/1
4 TABLET, ORALLY DISINTEGRATING ORAL EVERY 8 HOURS PRN
Qty: 20 TABLET | Refills: 0 | Status: ON HOLD | OUTPATIENT
Start: 2024-10-05

## 2024-10-05 ASSESSMENT — PAIN - FUNCTIONAL ASSESSMENT: PAIN_FUNCTIONAL_ASSESSMENT: NONE - DENIES PAIN

## 2024-10-05 NOTE — ED PROVIDER NOTES
within normal limits   TROPONIN - Abnormal; Notable for the following components:    Troponin, High Sensitivity 13 (*)     All other components within normal limits   BRAIN NATRIURETIC PEPTIDE - Abnormal; Notable for the following components:    NT Pro-.0 (*)     All other components within normal limits   LACTATE, SEPSIS - Abnormal; Notable for the following components:    Lactic Acid, Sepsis 2.0 (*)     All other components within normal limits   URINE WITH REFLEXED MICRO - Abnormal; Notable for the following components:    Glucose, Ur 500 (*)     Blood, Urine MODERATE (*)     Leukocyte Esterase, Urine MODERATE (*)     All other components within normal limits   POCT GLUCOSE - Abnormal; Notable for the following components:    POC Glucose 299 (*)     All other components within normal limits   POCT GLUCOSE - Abnormal; Notable for the following components:    POC Glucose 209 (*)     All other components within normal limits   COVID-19 & INFLUENZA COMBO   CULTURE, REFLEXED, URINE   LIPASE   LACTATE, SEPSIS   PROCALCITONIN   ANION GAP   OSMOLALITY   GLOMERULAR FILTRATION RATE, ESTIMATED       EMERGENCY DEPARTMENT COURSE:   Vitals:    Vitals:    10/04/24 2251 10/04/24 2359 10/05/24 0041 10/05/24 0136   BP: 131/69 (!) 149/79 (!) 141/80 (!) 143/82   Pulse: 78 76 88 71   Resp: 24 23 20 18   Temp:       TempSrc:       SpO2: 96% 96% 97% 95%   Weight:       Height:         MDM:  The patient was seen and evaluated within the ED today for left flank pain, vomiting, diarrhea, and multiple other complaints. On exam, I appreciated no acute findings.  Nephrostomy tube was in place with no overt signs of infection.  Old records were reviewed. Within the department, I observed the patient's vital signs to be within acceptable range. Radiological studies within the department revealed overall improvement with decreasing size of left subcapsular renal abscesses. Laboratory work was reassuring. Within the department the patient  was treated with Toradol. I observed the patient's condition to modestly improve during the duration of their stay. On re-exam patient was sleeping.  She reported decreased pain. Vital signs remained stable. The patient remained non-toxic appearing with no distress evident in the ER. The patient was comfortable with the plan of discharge home and to follow up with her provider and specialists. Anticipatory guidance was given. The patient was instructed to return to the emergency department for any worsening of their symptoms. Patient was discharged from the emergency department in good condition with all questions answered. See disposition below. I have given the patient strict written and verbal instructions about care at home, follow-up, and signs and symptoms of worsening of condition and they did verbalize understanding.     CRITICAL CARE:   None    CONSULTS:  None    PROCEDURES:  None    FINAL IMPRESSION      1. Gastroenteritis    2. Left flank pain    3. Perinephric abscess    4. Nephrostomy present (HCC)    5. Multiple somatic complaints          DISPOSITION/PLAN     1. Gastroenteritis    2. Left flank pain    3. Perinephric abscess    4. Nephrostomy present (HCC)    5. Multiple somatic complaints        PATIENT REFERRED TO:  Ryan Montalvo MD  [address] [place] [id number]    Schedule an appointment as soon as possible for a visit         DISCHARGE MEDICATIONS:  New Prescriptions    ONDANSETRON (ZOFRAN-ODT) 4 MG DISINTEGRATING TABLET    Take 1 tablet by mouth every 8 hours as needed for Nausea or Vomiting       (Please note that portions of this note were completed with a voice recognition program.  Efforts were made to edit the dictations but occasionally words are mis-transcribed.)    Libia Orlando PA-C 10/05/24 2:06 AM    MIKEY Vivas Jennifer, PA-C  10/05/24 [id number]

## 2024-10-05 NOTE — ED NOTES
Pt resting on cot w/ eyes open watching tv upon entrance. Respirations even and unlabored. [place] offered for pt comfort.

## 2024-10-05 NOTE — ED NOTES
Pt resting on cot w/ eyes closed upon entrance. Light off for pt comfort. Respirations even and unlabored.

## 2024-10-05 NOTE — ED TRIAGE NOTES
Pt presents to ED via EMS w/ c/o N/V from [facility]. EMS reports pt presented as SOB. Pt denies wearing oxygen at home. Pt 02 is 97% on roomair upon arrival. Pt reports headache 9/10. EMS reports pt is diabetic and blood sugar was 175 while in transit. Pt's blood sugar is 299 upon arrival. EKG obtained at this time. EMS reports giving zofran 4x while in transit. EMS reports there was no symptom relief w/ the zofran. EMS reports pt has 4x instances of emesis while in transit. Pt is oriented to self, place and situation but not oriented to time (year).

## 2024-10-05 NOTE — ED NOTES
Pt resting on cot w/ eyes open upon entrance. Pt ambulated to restroom w/o difficulty. Respirations even and unlabored. Light off for pt comfort.

## 2024-10-05 NOTE — ED NOTES
Pt resting on cot w/ eyes open watching tv upon entrance. Respirations even and unlabored. Light off for pt comfort.

## 2024-10-05 NOTE — ED NOTES
Pt resting on cot w/ eyes open upon entrance. Respirations even and unlabored. Light off for pt comfort.

## 2024-10-06 ENCOUNTER — APPOINTMENT (OUTPATIENT)
Dept: CT IMAGING | Age: 69
End: 2024-10-06
Payer: MEDICARE

## 2024-10-06 VITALS
RESPIRATION RATE: 16 BRPM | OXYGEN SATURATION: 95 % | SYSTOLIC BLOOD PRESSURE: 115 MMHG | WEIGHT: 173 LBS | TEMPERATURE: 98.4 F | HEART RATE: 64 BPM | HEIGHT: 64 IN | DIASTOLIC BLOOD PRESSURE: 65 MMHG | BODY MASS INDEX: 29.53 KG/M2

## 2024-10-06 LAB
ANION GAP SERPL CALC-SCNC: 11 MEQ/L (ref 8–16)
BACTERIA UR CULT: ABNORMAL
BASOPHILS ABSOLUTE: 0 THOU/MM3 (ref 0–0.1)
BASOPHILS NFR BLD AUTO: 0.5 %
BUN SERPL-MCNC: 18 MG/DL (ref 7–22)
CALCIUM SERPL-MCNC: 10.5 MG/DL (ref 8.5–10.5)
CHLORIDE SERPL-SCNC: 98 MEQ/L (ref 98–111)
CO2 SERPL-SCNC: 24 MEQ/L (ref 23–33)
CREAT SERPL-MCNC: 0.9 MG/DL (ref 0.4–1.2)
DEPRECATED RDW RBC AUTO: 49.3 FL (ref 35–45)
EOSINOPHIL NFR BLD AUTO: 4 %
EOSINOPHILS ABSOLUTE: 0.3 THOU/MM3 (ref 0–0.4)
ERYTHROCYTE [DISTWIDTH] IN BLOOD BY AUTOMATED COUNT: 15.6 % (ref 11.5–14.5)
GFR SERPL CREATININE-BSD FRML MDRD: 70 ML/MIN/1.73M2
GLUCOSE SERPL-MCNC: 179 MG/DL (ref 70–108)
HCT VFR BLD AUTO: 35.6 % (ref 37–47)
HGB BLD-MCNC: 11.2 GM/DL (ref 12–16)
IMM GRANULOCYTES # BLD AUTO: 0.01 THOU/MM3 (ref 0–0.07)
IMM GRANULOCYTES NFR BLD AUTO: 0.2 %
LACTIC ACID, SEPSIS: 1.4 MMOL/L (ref 0.5–1.9)
LYMPHOCYTES ABSOLUTE: 1.8 THOU/MM3 (ref 1–4.8)
LYMPHOCYTES NFR BLD AUTO: 27.3 %
MCH RBC QN AUTO: 27.2 PG (ref 26–33)
MCHC RBC AUTO-ENTMCNC: 31.5 GM/DL (ref 32.2–35.5)
MCV RBC AUTO: 86.4 FL (ref 81–99)
MONOCYTES ABSOLUTE: 0.5 THOU/MM3 (ref 0.4–1.3)
MONOCYTES NFR BLD AUTO: 7.3 %
NEUTROPHILS ABSOLUTE: 4 THOU/MM3 (ref 1.8–7.7)
NEUTROPHILS NFR BLD AUTO: 60.7 %
NRBC BLD AUTO-RTO: 0 /100 WBC
ORGANISM: ABNORMAL
OSMOLALITY SERPL CALC.SUM OF ELEC: 272.8 MOSMOL/KG (ref 275–300)
PLATELET # BLD AUTO: 211 THOU/MM3 (ref 130–400)
PMV BLD AUTO: 9.8 FL (ref 9.4–12.4)
POTASSIUM SERPL-SCNC: 4.3 MEQ/L (ref 3.5–5.2)
RBC # BLD AUTO: 4.12 MILL/MM3 (ref 4.2–5.4)
SODIUM SERPL-SCNC: 133 MEQ/L (ref 135–145)
WBC # BLD AUTO: 6.6 THOU/MM3 (ref 4.8–10.8)

## 2024-10-06 PROCEDURE — 36415 COLL VENOUS BLD VENIPUNCTURE: CPT

## 2024-10-06 PROCEDURE — 85025 COMPLETE CBC W/AUTO DIFF WBC: CPT

## 2024-10-06 PROCEDURE — 80048 BASIC METABOLIC PNL TOTAL CA: CPT

## 2024-10-06 PROCEDURE — 83605 ASSAY OF LACTIC ACID: CPT

## 2024-10-06 ASSESSMENT — PAIN - FUNCTIONAL ASSESSMENT: PAIN_FUNCTIONAL_ASSESSMENT: NONE - DENIES PAIN

## 2024-10-06 NOTE — DISCHARGE INSTRUCTIONS
Follow up with Interventional Radiology. Call [facility] [phone] and provide patient information.    PLEASE RETURN TO THE EMERGENCY DEPARTMENT IMMEDIATELY for worsening symptoms, white drainage from the wound, redness or streaking, or if you develop any concerning symptoms such as: high fever not relieved by acetaminophen (Tylenol) and/or ibuprofen (Motrin / Advil), chills, shortness of breath, chest pain, feeling of your heart fluttering or racing, persistent nausea and/or vomiting, vomiting up blood, blood in your stool, loss of consciousness, numbness, weakness or tingling in the arms or legs or change in color of the extremities, changes in mental status, persistent headache, blurry vision, loss of bladder / bowel control, unable to follow up with your physician, or other any other care or concern.

## 2024-10-06 NOTE — ED PROVIDER NOTES
PATIENT NAME: Elli Coulter  MRN: [id number]  : 1955  FONTAINE: 10/5/2024    I performed a history and physical examination of the patient and discussed management with the Resident. I reviewed the Resident's note and agree with the documented findings and plan of care. Any areas of disagreement are noted on the chart. I was personally present for the key portions of any procedures and have documented in the chart those procedures where I was not present during the key portions. I have reviewed the emergency nurses triage note and agree with the chief complaint, past medical history, past surgical history, allergies, medications, social and family history as documented unless otherwise noted below.    MEDICAL DEDISION MAKING (MDM)     Elli Coulter is a 69 y.o. female who presents to Emergency Department with nephrostomy tube fell out     Left side nephrostomy tube was accidentally pulled out by the patient when she sat up to go to the bathroom tonight.     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    Patient's vital signs are stable, no fever, nontoxic looking.  No bleeding or drainage from nephrostomy tube stoma site.    Discussed with IR, OK to discharge for OP nephrostomy tube reinsertion in next 2-3 days. SNF will call central scheduling Monday to schedule the procedure.      Vitals:    10/05/24 2349 10/06/24 0051   BP: 130/63 115/65   Pulse: 64    Resp: 16 16   Temp: 98.4 °F (36.9 °C)    TempSrc: Oral    SpO2: 94% 95%   Weight: 78.5 kg (173 lb)    Height: 1.626 m (5' 4\")      Labs Reviewed   CBC WITH AUTO DIFFERENTIAL - Abnormal; Notable for the following components:       Result Value    RBC 4.12 (*)     Hemoglobin 11.2 (*)     Hematocrit 35.6 (*)     MCHC 31.5 (*)     RDW-CV 15.6 (*)     RDW-SD 49.3 (*)     All other components within normal limits   BASIC 
abscess  MARILY     Decision Rules/Clinical Scores utilized:    Not Applicable    MIPS documentation:      Medical Decision Making  Patient presented with complaints of nephrostomy tube displacement.  To note that patient has a nephrostomy tube for perinephric abscess that has been a recurrent problem for her.  Patient denies any systemic symptoms concerning for an underlying infection.  Interventional radiology were contacted to check if patient can follow-up as outpatient or have to be admitted for replacement.    Basic workup was done to make sure patient does not have an underlying infection as well especially that the patient was not reliable in her history and states that she for got a lot of information.  Her vitals are stable though.   Interventional radiology recommended that patient follow-up as outpatient especially that patient is  stable and has no signs of infection.    Results discussed with patient.  Patient verbalized understanding and agreement to plan. Refer to ED course for additional information.      ED COURSE   ED Medications administered this visit (None if left blank):   Medications - No data to display         PROCEDURES: (None if blank)  Procedures:     CRITICAL CARE:  None    MEDICATION CHANGES     New Prescriptions    No medications on file       FINAL DISPOSITION   Shared Decision-Making was performed, disposition discussed with the patient/family and questions answered.    Outpatient follow up (If applicable):  No follow-up provider specified.  Not applicable          FINAL DIAGNOSES:  Final diagnoses:   Nephrostomy tube displaced (HCC)       Condition: condition: fair  Dispo: Admit to med/surg floor  DISPOSITION Decision To Admit 10/06/2024 12:55:38 AM  Condition at Disposition: Data Unavailable      This transcription was electronically signed. It was dictated by use of voice recognition software and electronically transcribed. The transcription may contain errors not detected in

## 2024-10-06 NOTE — ED TRIAGE NOTES
Patient presents to ED from [facility] via EMS with C/O nephrostomy falling out. Patient states it was placed 3 days ago. Patient states it fell out tonight at 2300. VS stable.

## 2024-10-09 DIAGNOSIS — R35.0 URINARY FREQUENCY: Primary | ICD-10-CM

## 2024-10-09 DIAGNOSIS — N39.0 URINARY TRACT INFECTION WITHOUT HEMATURIA, SITE UNSPECIFIED: ICD-10-CM

## 2024-10-11 ENCOUNTER — HOSPITAL ENCOUNTER (OUTPATIENT)
Age: 69
Setting detail: OBSERVATION
Discharge: NURSING, REHAB OR LONG-TERM CARE | End: 2024-10-15
Attending: EMERGENCY MEDICINE
Payer: MEDICARE

## 2024-10-11 ENCOUNTER — APPOINTMENT (OUTPATIENT)
Dept: CT IMAGING | Age: 69
End: 2024-10-11
Payer: MEDICARE

## 2024-10-11 DIAGNOSIS — T83.022A NEPHROSTOMY TUBE DISPLACED (HCC): Primary | ICD-10-CM

## 2024-10-11 DIAGNOSIS — G89.4 CHRONIC PAIN SYNDROME: ICD-10-CM

## 2024-10-11 LAB
ALBUMIN SERPL BCG-MCNC: 4.1 G/DL (ref 3.5–5.1)
ALP SERPL-CCNC: 71 U/L (ref 38–126)
ALT SERPL W/O P-5'-P-CCNC: 29 U/L (ref 11–66)
ANION GAP SERPL CALC-SCNC: 13 MEQ/L (ref 8–16)
AST SERPL-CCNC: 23 U/L (ref 5–40)
BACTERIA URNS QL MICRO: ABNORMAL /HPF
BASOPHILS ABSOLUTE: 0 THOU/MM3 (ref 0–0.1)
BASOPHILS NFR BLD AUTO: 0.4 %
BILIRUB CONJ SERPL-MCNC: 0.2 MG/DL (ref 0.1–13.8)
BILIRUB SERPL-MCNC: 0.3 MG/DL (ref 0.3–1.2)
BILIRUB UR QL STRIP.AUTO: NEGATIVE
BUN SERPL-MCNC: 14 MG/DL (ref 7–22)
CALCIUM SERPL-MCNC: 10.1 MG/DL (ref 8.5–10.5)
CASTS #/AREA URNS LPF: ABNORMAL /LPF
CASTS 2: ABNORMAL /LPF
CHARACTER UR: ABNORMAL
CHLORIDE SERPL-SCNC: 93 MEQ/L (ref 98–111)
CO2 SERPL-SCNC: 25 MEQ/L (ref 23–33)
COLOR, UA: YELLOW
CREAT SERPL-MCNC: 0.7 MG/DL (ref 0.4–1.2)
CRYSTALS URNS MICRO: ABNORMAL
DEPRECATED RDW RBC AUTO: 47.2 FL (ref 35–45)
EOSINOPHIL NFR BLD AUTO: 2.1 %
EOSINOPHILS ABSOLUTE: 0.2 THOU/MM3 (ref 0–0.4)
EPITHELIAL CELLS, UA: ABNORMAL /HPF
ERYTHROCYTE [DISTWIDTH] IN BLOOD BY AUTOMATED COUNT: 14.9 % (ref 11.5–14.5)
GFR SERPL CREATININE-BSD FRML MDRD: > 90 ML/MIN/1.73M2
GLUCOSE SERPL-MCNC: 206 MG/DL (ref 70–108)
GLUCOSE UR QL STRIP.AUTO: NEGATIVE MG/DL
HCT VFR BLD AUTO: 36.4 % (ref 37–47)
HGB BLD-MCNC: 11.4 GM/DL (ref 12–16)
HGB UR QL STRIP.AUTO: ABNORMAL
IMM GRANULOCYTES # BLD AUTO: 0.04 THOU/MM3 (ref 0–0.07)
IMM GRANULOCYTES NFR BLD AUTO: 0.4 %
KETONES UR QL STRIP.AUTO: NEGATIVE
LIPASE SERPL-CCNC: 35.8 U/L (ref 5.6–51.3)
LYMPHOCYTES ABSOLUTE: 2.8 THOU/MM3 (ref 1–4.8)
LYMPHOCYTES NFR BLD AUTO: 30.7 %
MAGNESIUM SERPL-MCNC: 1.5 MG/DL (ref 1.6–2.4)
MCH RBC QN AUTO: 27 PG (ref 26–33)
MCHC RBC AUTO-ENTMCNC: 31.3 GM/DL (ref 32.2–35.5)
MCV RBC AUTO: 86.1 FL (ref 81–99)
MISCELLANEOUS 2: ABNORMAL
MONOCYTES ABSOLUTE: 0.7 THOU/MM3 (ref 0.4–1.3)
MONOCYTES NFR BLD AUTO: 7.4 %
NEUTROPHILS ABSOLUTE: 5.3 THOU/MM3 (ref 1.8–7.7)
NEUTROPHILS NFR BLD AUTO: 59 %
NITRITE UR QL STRIP: NEGATIVE
NRBC BLD AUTO-RTO: 0 /100 WBC
OSMOLALITY SERPL CALC.SUM OF ELEC: 269.1 MOSMOL/KG (ref 275–300)
PH UR STRIP.AUTO: 6.5 [PH] (ref 5–9)
PLATELET # BLD AUTO: 297 THOU/MM3 (ref 130–400)
PMV BLD AUTO: 9.9 FL (ref 9.4–12.4)
POTASSIUM SERPL-SCNC: 4.4 MEQ/L (ref 3.5–5.2)
PROT SERPL-MCNC: 7.8 G/DL (ref 6.1–8)
PROT UR STRIP.AUTO-MCNC: NEGATIVE MG/DL
RBC # BLD AUTO: 4.23 MILL/MM3 (ref 4.2–5.4)
RBC URINE: ABNORMAL /HPF
RENAL EPI CELLS #/AREA URNS HPF: ABNORMAL /[HPF]
SODIUM SERPL-SCNC: 131 MEQ/L (ref 135–145)
SP GR UR REFRACT.AUTO: 1.01 (ref 1–1.03)
UROBILINOGEN, URINE: 1 EU/DL (ref 0–1)
WBC # BLD AUTO: 9 THOU/MM3 (ref 4.8–10.8)
WBC #/AREA URNS HPF: ABNORMAL /HPF
WBC #/AREA URNS HPF: ABNORMAL /[HPF]
YEAST LIKE FUNGI URNS QL MICRO: ABNORMAL

## 2024-10-11 PROCEDURE — 83690 ASSAY OF LIPASE: CPT

## 2024-10-11 PROCEDURE — 99285 EMERGENCY DEPT VISIT HI MDM: CPT

## 2024-10-11 PROCEDURE — G0378 HOSPITAL OBSERVATION PER HR: HCPCS

## 2024-10-11 PROCEDURE — 96376 TX/PRO/DX INJ SAME DRUG ADON: CPT

## 2024-10-11 PROCEDURE — 6360000004 HC RX CONTRAST MEDICATION: Performed by: EMERGENCY MEDICINE

## 2024-10-11 PROCEDURE — 96375 TX/PRO/DX INJ NEW DRUG ADDON: CPT

## 2024-10-11 PROCEDURE — 81001 URINALYSIS AUTO W/SCOPE: CPT

## 2024-10-11 PROCEDURE — 80053 COMPREHEN METABOLIC PANEL: CPT

## 2024-10-11 PROCEDURE — 74177 CT ABD & PELVIS W/CONTRAST: CPT

## 2024-10-11 PROCEDURE — 99223 1ST HOSP IP/OBS HIGH 75: CPT

## 2024-10-11 PROCEDURE — 6360000002 HC RX W HCPCS: Performed by: EMERGENCY MEDICINE

## 2024-10-11 PROCEDURE — 83735 ASSAY OF MAGNESIUM: CPT

## 2024-10-11 PROCEDURE — 36415 COLL VENOUS BLD VENIPUNCTURE: CPT

## 2024-10-11 PROCEDURE — 82248 BILIRUBIN DIRECT: CPT

## 2024-10-11 PROCEDURE — 87186 SC STD MICRODIL/AGAR DIL: CPT

## 2024-10-11 PROCEDURE — 85025 COMPLETE CBC W/AUTO DIFF WBC: CPT

## 2024-10-11 PROCEDURE — 87077 CULTURE AEROBIC IDENTIFY: CPT

## 2024-10-11 PROCEDURE — 6360000002 HC RX W HCPCS

## 2024-10-11 PROCEDURE — 96374 THER/PROPH/DIAG INJ IV PUSH: CPT

## 2024-10-11 PROCEDURE — 93005 ELECTROCARDIOGRAM TRACING: CPT | Performed by: EMERGENCY MEDICINE

## 2024-10-11 PROCEDURE — 87086 URINE CULTURE/COLONY COUNT: CPT

## 2024-10-11 RX ORDER — FENTANYL CITRATE 50 UG/ML
50 INJECTION, SOLUTION INTRAMUSCULAR; INTRAVENOUS ONCE
Status: COMPLETED | OUTPATIENT
Start: 2024-10-11 | End: 2024-10-11

## 2024-10-11 RX ORDER — ONDANSETRON 4 MG/1
4 TABLET, ORALLY DISINTEGRATING ORAL EVERY 8 HOURS PRN
Status: DISCONTINUED | OUTPATIENT
Start: 2024-10-11 | End: 2024-10-15 | Stop reason: HOSPADM

## 2024-10-11 RX ORDER — POTASSIUM CHLORIDE 1500 MG/1
40 TABLET, EXTENDED RELEASE ORAL PRN
Status: DISCONTINUED | OUTPATIENT
Start: 2024-10-11 | End: 2024-10-15 | Stop reason: HOSPADM

## 2024-10-11 RX ORDER — SODIUM CHLORIDE 0.9 % (FLUSH) 0.9 %
5-40 SYRINGE (ML) INJECTION EVERY 12 HOURS SCHEDULED
Status: DISCONTINUED | OUTPATIENT
Start: 2024-10-11 | End: 2024-10-15 | Stop reason: HOSPADM

## 2024-10-11 RX ORDER — ONDANSETRON 2 MG/ML
4 INJECTION INTRAMUSCULAR; INTRAVENOUS EVERY 6 HOURS PRN
Status: DISCONTINUED | OUTPATIENT
Start: 2024-10-11 | End: 2024-10-15 | Stop reason: HOSPADM

## 2024-10-11 RX ORDER — IOPAMIDOL 755 MG/ML
80 INJECTION, SOLUTION INTRAVASCULAR
Status: COMPLETED | OUTPATIENT
Start: 2024-10-11 | End: 2024-10-11

## 2024-10-11 RX ORDER — MAGNESIUM SULFATE IN WATER 40 MG/ML
2000 INJECTION, SOLUTION INTRAVENOUS PRN
Status: DISCONTINUED | OUTPATIENT
Start: 2024-10-11 | End: 2024-10-15 | Stop reason: HOSPADM

## 2024-10-11 RX ORDER — SODIUM CHLORIDE 9 MG/ML
INJECTION, SOLUTION INTRAVENOUS PRN
Status: DISCONTINUED | OUTPATIENT
Start: 2024-10-11 | End: 2024-10-15 | Stop reason: HOSPADM

## 2024-10-11 RX ORDER — POLYETHYLENE GLYCOL 3350 17 G/17G
17 POWDER, FOR SOLUTION ORAL DAILY PRN
Status: DISCONTINUED | OUTPATIENT
Start: 2024-10-11 | End: 2024-10-15 | Stop reason: HOSPADM

## 2024-10-11 RX ORDER — ENOXAPARIN SODIUM 100 MG/ML
40 INJECTION SUBCUTANEOUS DAILY
Status: DISCONTINUED | OUTPATIENT
Start: 2024-10-12 | End: 2024-10-15 | Stop reason: HOSPADM

## 2024-10-11 RX ORDER — SODIUM CHLORIDE 0.9 % (FLUSH) 0.9 %
5-40 SYRINGE (ML) INJECTION PRN
Status: DISCONTINUED | OUTPATIENT
Start: 2024-10-11 | End: 2024-10-15 | Stop reason: HOSPADM

## 2024-10-11 RX ORDER — POTASSIUM CHLORIDE 7.45 MG/ML
10 INJECTION INTRAVENOUS PRN
Status: DISCONTINUED | OUTPATIENT
Start: 2024-10-11 | End: 2024-10-15 | Stop reason: HOSPADM

## 2024-10-11 RX ADMIN — FENTANYL CITRATE 50 MCG: 50 INJECTION, SOLUTION INTRAMUSCULAR; INTRAVENOUS at 19:12

## 2024-10-11 RX ADMIN — IOPAMIDOL 80 ML: 755 INJECTION, SOLUTION INTRAVENOUS at 20:01

## 2024-10-11 RX ADMIN — FENTANYL CITRATE 50 MCG: 50 INJECTION, SOLUTION INTRAMUSCULAR; INTRAVENOUS at 22:42

## 2024-10-11 ASSESSMENT — PAIN DESCRIPTION - DESCRIPTORS
DESCRIPTORS: PRESSURE;DISCOMFORT
DESCRIPTORS: DISCOMFORT

## 2024-10-11 ASSESSMENT — PAIN - FUNCTIONAL ASSESSMENT
PAIN_FUNCTIONAL_ASSESSMENT: 0-10

## 2024-10-11 ASSESSMENT — PAIN DESCRIPTION - LOCATION
LOCATION: ABDOMEN;FLANK
LOCATION: ABDOMEN;FLANK
LOCATION: ABDOMEN

## 2024-10-11 ASSESSMENT — PAIN SCALES - GENERAL
PAINLEVEL_OUTOF10: 8
PAINLEVEL_OUTOF10: 8
PAINLEVEL_OUTOF10: 10
PAINLEVEL_OUTOF10: 10
PAINLEVEL_OUTOF10: 9
PAINLEVEL_OUTOF10: 10

## 2024-10-11 ASSESSMENT — PAIN DESCRIPTION - PAIN TYPE: TYPE: ACUTE PAIN

## 2024-10-11 ASSESSMENT — PAIN DESCRIPTION - FREQUENCY: FREQUENCY: CONTINUOUS

## 2024-10-11 ASSESSMENT — PAIN DESCRIPTION - ONSET: ONSET: ON-GOING

## 2024-10-11 ASSESSMENT — PAIN DESCRIPTION - ORIENTATION
ORIENTATION: LEFT
ORIENTATION: LEFT

## 2024-10-11 NOTE — ED TRIAGE NOTES
Pt presents to ED via EMS with chief complaint of left sided abdominal and back pain. Pt states it has been going on for weeks, but is worse today. States she accidentally pulled out her nephrostomy tube on 10/5. Denies trouble urinating. No meds taken prior to arrival.

## 2024-10-11 NOTE — ED PROVIDER NOTES
[facility] EMERGENCY DEPT  EMERGENCY DEPARTMENT ENCOUNTER          Pt Name: Elli Coulter  MRN: [id number]  Birthdate 1955  Date of evaluation: 10/11/2024  Physician: Erin Conrad MD  Supervising Attending Physician: Bradford Carrasquillo DO       CHIEF COMPLAINT       Chief Complaint   Patient presents with    Abdominal Pain         HISTORY OF PRESENT ILLNESS    HPI  Elli Coulter is a 69 y.o. female with past medical history of erosive gastritis, erosive esophagitis, GERD, COPD, cocaine abuse, hypertension, bipolar 1 disorder's, hyponatremia, hypothyroidism, history of DVT, dyslipidemia, chronic heart failure, asymmetrical sensorineural hearing loss, amnesia, buedinger ludloff laewen disease, coronary vasospasm, pancreatic insufficiency, osteoarthritis, diabetes mellitus type 2, acute encephalopathy, UTI, coronary artery disease, diverticulosis, hepatic steatosis, iron deficiency anemia, psoas abscess, polyps cancer, who presents to the emergency department from nursing home by EMS for evaluation of abdominal pain and left flank pain. The patient report that the pain started week ago when she pulled out the nephrostomy tube, and her nephrologist replace the tube and she accidentally pulled it while she is sitting on it and her doctor will replace the tube on Monday and will perform the nephrectomy Thursday per patient history. The patient also report headache, nausea, vomiting daily and back pain related to the left flank pain and patient state the pain is moved from her left flank are to the left lower quadrant area and suprapubic area.     The patient denies fever, chills, lightheadedness, dizziness, vision changes, tinnitus, cough, congestion, sore throat, neck pain/stiffness, chest pain, shortness of breath, constipation, diarrhea, dysuria, blood in the urine or stool, numbness/tingling/weakness in extremities.    The patient has no other acute complaints at this time.      PAST

## 2024-10-11 NOTE — ED NOTES
This RN assumes care of pt. Pt resting on cot. Vitals reevaluated. Respirations even and unlabored. Denies further needs.

## 2024-10-12 LAB
ANION GAP SERPL CALC-SCNC: 15 MEQ/L (ref 8–16)
BASOPHILS ABSOLUTE: 0 THOU/MM3 (ref 0–0.1)
BASOPHILS NFR BLD AUTO: 0.4 %
BUN SERPL-MCNC: 16 MG/DL (ref 7–22)
CALCIUM SERPL-MCNC: 10.1 MG/DL (ref 8.5–10.5)
CHLORIDE SERPL-SCNC: 98 MEQ/L (ref 98–111)
CO2 SERPL-SCNC: 22 MEQ/L (ref 23–33)
CREAT SERPL-MCNC: 0.7 MG/DL (ref 0.4–1.2)
DEPRECATED RDW RBC AUTO: 48.2 FL (ref 35–45)
EKG ATRIAL RATE: 81 BPM
EKG P AXIS: 57 DEGREES
EKG P-R INTERVAL: 170 MS
EKG Q-T INTERVAL: 378 MS
EKG QRS DURATION: 82 MS
EKG QTC CALCULATION (BAZETT): 439 MS
EKG R AXIS: 12 DEGREES
EKG T AXIS: 74 DEGREES
EKG VENTRICULAR RATE: 81 BPM
EOSINOPHIL NFR BLD AUTO: 2 %
EOSINOPHILS ABSOLUTE: 0.2 THOU/MM3 (ref 0–0.4)
ERYTHROCYTE [DISTWIDTH] IN BLOOD BY AUTOMATED COUNT: 15.1 % (ref 11.5–14.5)
GFR SERPL CREATININE-BSD FRML MDRD: > 90 ML/MIN/1.73M2
GLUCOSE BLD STRIP.AUTO-MCNC: 183 MG/DL (ref 70–108)
GLUCOSE BLD STRIP.AUTO-MCNC: 186 MG/DL (ref 70–108)
GLUCOSE BLD STRIP.AUTO-MCNC: 235 MG/DL (ref 70–108)
GLUCOSE BLD STRIP.AUTO-MCNC: 274 MG/DL (ref 70–108)
GLUCOSE SERPL-MCNC: 181 MG/DL (ref 70–108)
HCT VFR BLD AUTO: 36.7 % (ref 37–47)
HGB BLD-MCNC: 11.5 GM/DL (ref 12–16)
IMM GRANULOCYTES # BLD AUTO: 0.03 THOU/MM3 (ref 0–0.07)
IMM GRANULOCYTES NFR BLD AUTO: 0.4 %
LYMPHOCYTES ABSOLUTE: 2.1 THOU/MM3 (ref 1–4.8)
LYMPHOCYTES NFR BLD AUTO: 26.5 %
MAGNESIUM SERPL-MCNC: 1.8 MG/DL (ref 1.6–2.4)
MCH RBC QN AUTO: 27.2 PG (ref 26–33)
MCHC RBC AUTO-ENTMCNC: 31.3 GM/DL (ref 32.2–35.5)
MCV RBC AUTO: 86.8 FL (ref 81–99)
MONOCYTES ABSOLUTE: 0.7 THOU/MM3 (ref 0.4–1.3)
MONOCYTES NFR BLD AUTO: 8.1 %
NEUTROPHILS ABSOLUTE: 5.1 THOU/MM3 (ref 1.8–7.7)
NEUTROPHILS NFR BLD AUTO: 62.6 %
NRBC BLD AUTO-RTO: 0 /100 WBC
OSMOLALITY SERPL CALC.SUM OF ELEC: 275.9 MOSMOL/KG (ref 275–300)
PLATELET # BLD AUTO: 296 THOU/MM3 (ref 130–400)
PMV BLD AUTO: 9.7 FL (ref 9.4–12.4)
POTASSIUM SERPL-SCNC: 4.3 MEQ/L (ref 3.5–5.2)
RBC # BLD AUTO: 4.23 MILL/MM3 (ref 4.2–5.4)
SODIUM SERPL-SCNC: 135 MEQ/L (ref 135–145)
WBC # BLD AUTO: 8.1 THOU/MM3 (ref 4.8–10.8)

## 2024-10-12 PROCEDURE — 6370000000 HC RX 637 (ALT 250 FOR IP)

## 2024-10-12 PROCEDURE — 85025 COMPLETE CBC W/AUTO DIFF WBC: CPT

## 2024-10-12 PROCEDURE — 2580000003 HC RX 258: Performed by: EMERGENCY MEDICINE

## 2024-10-12 PROCEDURE — G0378 HOSPITAL OBSERVATION PER HR: HCPCS

## 2024-10-12 PROCEDURE — 96365 THER/PROPH/DIAG IV INF INIT: CPT

## 2024-10-12 PROCEDURE — 36415 COLL VENOUS BLD VENIPUNCTURE: CPT

## 2024-10-12 PROCEDURE — 93010 ELECTROCARDIOGRAM REPORT: CPT | Performed by: INTERNAL MEDICINE

## 2024-10-12 PROCEDURE — 6360000002 HC RX W HCPCS: Performed by: EMERGENCY MEDICINE

## 2024-10-12 PROCEDURE — 83735 ASSAY OF MAGNESIUM: CPT

## 2024-10-12 PROCEDURE — 80048 BASIC METABOLIC PNL TOTAL CA: CPT

## 2024-10-12 PROCEDURE — 2580000003 HC RX 258

## 2024-10-12 PROCEDURE — 2500000003 HC RX 250 WO HCPCS

## 2024-10-12 PROCEDURE — 82948 REAGENT STRIP/BLOOD GLUCOSE: CPT

## 2024-10-12 PROCEDURE — 94640 AIRWAY INHALATION TREATMENT: CPT

## 2024-10-12 PROCEDURE — 6370000000 HC RX 637 (ALT 250 FOR IP): Performed by: FAMILY MEDICINE

## 2024-10-12 PROCEDURE — 96361 HYDRATE IV INFUSION ADD-ON: CPT

## 2024-10-12 PROCEDURE — 99232 SBSQ HOSP IP/OBS MODERATE 35: CPT | Performed by: FAMILY MEDICINE

## 2024-10-12 RX ORDER — FLUTICASONE PROPIONATE 50 MCG
1 SPRAY, SUSPENSION (ML) NASAL 2 TIMES DAILY
Status: DISCONTINUED | OUTPATIENT
Start: 2024-10-12 | End: 2024-10-15 | Stop reason: HOSPADM

## 2024-10-12 RX ORDER — INSULIN GLARGINE 100 [IU]/ML
14 INJECTION, SOLUTION SUBCUTANEOUS NIGHTLY
COMMUNITY

## 2024-10-12 RX ORDER — FENOFIBRATE 160 MG/1
160 TABLET ORAL DAILY
Status: DISCONTINUED | OUTPATIENT
Start: 2024-10-12 | End: 2024-10-15 | Stop reason: HOSPADM

## 2024-10-12 RX ORDER — FERROUS SULFATE 325(65) MG
325 TABLET ORAL
Status: DISCONTINUED | OUTPATIENT
Start: 2024-10-14 | End: 2024-10-15 | Stop reason: HOSPADM

## 2024-10-12 RX ORDER — PANTOPRAZOLE SODIUM 40 MG/1
40 TABLET, DELAYED RELEASE ORAL
Status: DISCONTINUED | OUTPATIENT
Start: 2024-10-12 | End: 2024-10-15 | Stop reason: HOSPADM

## 2024-10-12 RX ORDER — ESCITALOPRAM OXALATE 20 MG/1
20 TABLET ORAL DAILY
Status: DISCONTINUED | OUTPATIENT
Start: 2024-10-12 | End: 2024-10-15 | Stop reason: HOSPADM

## 2024-10-12 RX ORDER — DOCUSATE SODIUM 100 MG/1
100 CAPSULE, LIQUID FILLED ORAL 2 TIMES DAILY PRN
Status: DISCONTINUED | OUTPATIENT
Start: 2024-10-12 | End: 2024-10-15 | Stop reason: HOSPADM

## 2024-10-12 RX ORDER — INSULIN GLARGINE 100 [IU]/ML
14 INJECTION, SOLUTION SUBCUTANEOUS NIGHTLY
Status: DISCONTINUED | OUTPATIENT
Start: 2024-10-12 | End: 2024-10-15 | Stop reason: HOSPADM

## 2024-10-12 RX ORDER — ALBUTEROL SULFATE 90 UG/1
2 INHALANT RESPIRATORY (INHALATION) EVERY 4 HOURS PRN
Status: DISCONTINUED | OUTPATIENT
Start: 2024-10-12 | End: 2024-10-15 | Stop reason: HOSPADM

## 2024-10-12 RX ORDER — DEXTROSE MONOHYDRATE 100 MG/ML
INJECTION, SOLUTION INTRAVENOUS CONTINUOUS PRN
Status: DISCONTINUED | OUTPATIENT
Start: 2024-10-12 | End: 2024-10-15 | Stop reason: HOSPADM

## 2024-10-12 RX ORDER — CARVEDILOL 3.12 MG/1
3.12 TABLET ORAL 2 TIMES DAILY
Status: DISCONTINUED | OUTPATIENT
Start: 2024-10-12 | End: 2024-10-15 | Stop reason: HOSPADM

## 2024-10-12 RX ORDER — INSULIN LISPRO 100 [IU]/ML
0-4 INJECTION, SOLUTION INTRAVENOUS; SUBCUTANEOUS
Status: DISCONTINUED | OUTPATIENT
Start: 2024-10-12 | End: 2024-10-13

## 2024-10-12 RX ORDER — LEVOTHYROXINE SODIUM 75 UG/1
75 TABLET ORAL
Status: DISCONTINUED | OUTPATIENT
Start: 2024-10-12 | End: 2024-10-15 | Stop reason: HOSPADM

## 2024-10-12 RX ORDER — HYDROCODONE BITARTRATE AND ACETAMINOPHEN 5; 325 MG/1; MG/1
1 TABLET ORAL EVERY 4 HOURS PRN
Status: DISCONTINUED | OUTPATIENT
Start: 2024-10-12 | End: 2024-10-15 | Stop reason: HOSPADM

## 2024-10-12 RX ORDER — HYDROCODONE BITARTRATE AND ACETAMINOPHEN 5; 325 MG/1; MG/1
2 TABLET ORAL EVERY 4 HOURS PRN
Status: DISCONTINUED | OUTPATIENT
Start: 2024-10-12 | End: 2024-10-15 | Stop reason: HOSPADM

## 2024-10-12 RX ORDER — ALBUTEROL SULFATE 90 UG/1
2 INHALANT RESPIRATORY (INHALATION)
Status: DISCONTINUED | OUTPATIENT
Start: 2024-10-12 | End: 2024-10-12

## 2024-10-12 RX ORDER — VITAMIN B COMPLEX
2000 TABLET ORAL DAILY
Status: DISCONTINUED | OUTPATIENT
Start: 2024-10-12 | End: 2024-10-15 | Stop reason: HOSPADM

## 2024-10-12 RX ORDER — ALBUTEROL SULFATE 0.83 MG/ML
2.5 SOLUTION RESPIRATORY (INHALATION) EVERY 6 HOURS PRN
Status: DISCONTINUED | OUTPATIENT
Start: 2024-10-12 | End: 2024-10-15 | Stop reason: HOSPADM

## 2024-10-12 RX ORDER — LIDOCAINE 4 G/G
1 PATCH TOPICAL DAILY
Status: DISCONTINUED | OUTPATIENT
Start: 2024-10-12 | End: 2024-10-15 | Stop reason: HOSPADM

## 2024-10-12 RX ORDER — ATORVASTATIN CALCIUM 40 MG/1
40 TABLET, FILM COATED ORAL NIGHTLY
Status: DISCONTINUED | OUTPATIENT
Start: 2024-10-12 | End: 2024-10-15 | Stop reason: HOSPADM

## 2024-10-12 RX ORDER — FOLIC ACID 1 MG/1
1 TABLET ORAL DAILY
Status: DISCONTINUED | OUTPATIENT
Start: 2024-10-12 | End: 2024-10-15 | Stop reason: HOSPADM

## 2024-10-12 RX ORDER — LANOLIN ALCOHOL/MO/W.PET/CERES
50 CREAM (GRAM) TOPICAL DAILY
Status: DISCONTINUED | OUTPATIENT
Start: 2024-10-12 | End: 2024-10-15 | Stop reason: HOSPADM

## 2024-10-12 RX ORDER — LANOLIN ALCOHOL/MO/W.PET/CERES
3 CREAM (GRAM) TOPICAL NIGHTLY
Status: DISCONTINUED | OUTPATIENT
Start: 2024-10-12 | End: 2024-10-15 | Stop reason: HOSPADM

## 2024-10-12 RX ORDER — GLUCAGON 1 MG/ML
1 KIT INJECTION PRN
Status: DISCONTINUED | OUTPATIENT
Start: 2024-10-12 | End: 2024-10-15 | Stop reason: HOSPADM

## 2024-10-12 RX ORDER — LEVOTHYROXINE SODIUM 150 UG/1
150 TABLET ORAL
Status: DISCONTINUED | OUTPATIENT
Start: 2024-10-13 | End: 2024-10-15 | Stop reason: HOSPADM

## 2024-10-12 RX ORDER — TRAZODONE HYDROCHLORIDE 100 MG/1
200 TABLET ORAL NIGHTLY
Status: DISCONTINUED | OUTPATIENT
Start: 2024-10-12 | End: 2024-10-15 | Stop reason: HOSPADM

## 2024-10-12 RX ORDER — BUPROPION HYDROCHLORIDE 300 MG/1
300 TABLET ORAL EVERY MORNING
Status: DISCONTINUED | OUTPATIENT
Start: 2024-10-12 | End: 2024-10-15 | Stop reason: HOSPADM

## 2024-10-12 RX ADMIN — TRAZODONE HYDROCHLORIDE 200 MG: 100 TABLET ORAL at 02:20

## 2024-10-12 RX ADMIN — FLUTICASONE PROPIONATE 1 SPRAY: 50 SPRAY, METERED NASAL at 11:04

## 2024-10-12 RX ADMIN — INSULIN GLARGINE 14 UNITS: 100 INJECTION, SOLUTION SUBCUTANEOUS at 02:17

## 2024-10-12 RX ADMIN — INSULIN LISPRO 1 UNITS: 100 INJECTION, SOLUTION INTRAVENOUS; SUBCUTANEOUS at 17:45

## 2024-10-12 RX ADMIN — PIPERACILLIN AND TAZOBACTAM 4500 MG: 4; .5 INJECTION, POWDER, FOR SOLUTION INTRAVENOUS at 01:55

## 2024-10-12 RX ADMIN — CARVEDILOL 3.12 MG: 3.12 TABLET, FILM COATED ORAL at 09:27

## 2024-10-12 RX ADMIN — HYDROCODONE BITARTRATE AND ACETAMINOPHEN 2 TABLET: 5; 325 TABLET ORAL at 13:04

## 2024-10-12 RX ADMIN — Medication 2 PUFF: at 20:43

## 2024-10-12 RX ADMIN — SODIUM CHLORIDE, PRESERVATIVE FREE 10 ML: 5 INJECTION INTRAVENOUS at 22:10

## 2024-10-12 RX ADMIN — DICLOFENAC SODIUM 4 G: 10 GEL TOPICAL at 09:27

## 2024-10-12 RX ADMIN — Medication 2000 UNITS: at 09:27

## 2024-10-12 RX ADMIN — PYRIDOXINE HCL TAB 50 MG 50 MG: 50 TAB at 09:27

## 2024-10-12 RX ADMIN — Medication 3 MG: at 02:20

## 2024-10-12 RX ADMIN — INSULIN GLARGINE 14 UNITS: 100 INJECTION, SOLUTION SUBCUTANEOUS at 22:08

## 2024-10-12 RX ADMIN — ESCITALOPRAM OXALATE 20 MG: 20 TABLET, FILM COATED ORAL at 09:27

## 2024-10-12 RX ADMIN — CARVEDILOL 3.12 MG: 3.12 TABLET, FILM COATED ORAL at 02:20

## 2024-10-12 RX ADMIN — BUPROPION HYDROCHLORIDE 300 MG: 300 TABLET, EXTENDED RELEASE ORAL at 09:27

## 2024-10-12 RX ADMIN — Medication 3 MG: at 22:08

## 2024-10-12 RX ADMIN — TRAZODONE HYDROCHLORIDE 200 MG: 100 TABLET ORAL at 22:08

## 2024-10-12 RX ADMIN — HYDROCODONE BITARTRATE AND ACETAMINOPHEN 2 TABLET: 5; 325 TABLET ORAL at 00:11

## 2024-10-12 RX ADMIN — HYDROCODONE BITARTRATE AND ACETAMINOPHEN 2 TABLET: 5; 325 TABLET ORAL at 22:13

## 2024-10-12 RX ADMIN — FLUTICASONE PROPIONATE 1 SPRAY: 50 SPRAY, METERED NASAL at 22:10

## 2024-10-12 RX ADMIN — CARVEDILOL 3.12 MG: 3.12 TABLET, FILM COATED ORAL at 22:08

## 2024-10-12 RX ADMIN — HYDROCODONE BITARTRATE AND ACETAMINOPHEN 2 TABLET: 5; 325 TABLET ORAL at 09:22

## 2024-10-12 RX ADMIN — FOLIC ACID 1 MG: 1 TABLET ORAL at 09:27

## 2024-10-12 RX ADMIN — HYDROCODONE BITARTRATE AND ACETAMINOPHEN 2 TABLET: 5; 325 TABLET ORAL at 16:51

## 2024-10-12 RX ADMIN — ATORVASTATIN CALCIUM 40 MG: 40 TABLET, FILM COATED ORAL at 02:20

## 2024-10-12 RX ADMIN — PANTOPRAZOLE SODIUM 40 MG: 40 TABLET, DELAYED RELEASE ORAL at 16:49

## 2024-10-12 RX ADMIN — INSULIN LISPRO 2 UNITS: 100 INJECTION, SOLUTION INTRAVENOUS; SUBCUTANEOUS at 22:08

## 2024-10-12 RX ADMIN — MICONAZOLE NITRATE: 2 POWDER TOPICAL at 13:06

## 2024-10-12 RX ADMIN — ATORVASTATIN CALCIUM 40 MG: 40 TABLET, FILM COATED ORAL at 22:08

## 2024-10-12 RX ADMIN — SODIUM CHLORIDE: 9 INJECTION, SOLUTION INTRAVENOUS at 01:54

## 2024-10-12 RX ADMIN — SODIUM CHLORIDE, PRESERVATIVE FREE 10 ML: 5 INJECTION INTRAVENOUS at 09:27

## 2024-10-12 RX ADMIN — FENOFIBRATE 160 MG: 160 TABLET ORAL at 09:27

## 2024-10-12 RX ADMIN — SODIUM CHLORIDE, PRESERVATIVE FREE 10 ML: 5 INJECTION INTRAVENOUS at 00:11

## 2024-10-12 ASSESSMENT — PAIN DESCRIPTION - LOCATION
LOCATION: FLANK
LOCATION: ABDOMEN;BACK
LOCATION: ABDOMEN;BACK
LOCATION: BACK
LOCATION: ABDOMEN
LOCATION: BACK

## 2024-10-12 ASSESSMENT — PAIN DESCRIPTION - DESCRIPTORS
DESCRIPTORS: ACHING

## 2024-10-12 ASSESSMENT — PAIN DESCRIPTION - ORIENTATION
ORIENTATION: LEFT
ORIENTATION: LEFT;MID;LOWER

## 2024-10-12 ASSESSMENT — PAIN DESCRIPTION - ONSET
ONSET: ON-GOING

## 2024-10-12 ASSESSMENT — PAIN DESCRIPTION - FREQUENCY
FREQUENCY: CONTINUOUS

## 2024-10-12 ASSESSMENT — PAIN SCALES - GENERAL
PAINLEVEL_OUTOF10: 10
PAINLEVEL_OUTOF10: 5
PAINLEVEL_OUTOF10: 4
PAINLEVEL_OUTOF10: 7
PAINLEVEL_OUTOF10: 8
PAINLEVEL_OUTOF10: 7
PAINLEVEL_OUTOF10: 8
PAINLEVEL_OUTOF10: 4

## 2024-10-12 ASSESSMENT — PAIN - FUNCTIONAL ASSESSMENT
PAIN_FUNCTIONAL_ASSESSMENT: ACTIVITIES ARE NOT PREVENTED

## 2024-10-12 ASSESSMENT — PAIN DESCRIPTION - PAIN TYPE
TYPE: ACUTE PAIN

## 2024-10-12 ASSESSMENT — PAIN SCALES - WONG BAKER: WONGBAKER_NUMERICALRESPONSE: HURTS A LITTLE BIT

## 2024-10-12 NOTE — H&P
are equal, round, and reactive to light.   Cardiovascular:      Rate and Rhythm: Normal rate and regular rhythm.   Pulmonary:      Effort: Pulmonary effort is normal.      Breath sounds: Normal breath sounds.   Abdominal:      Palpations: Abdomen is soft.      Tenderness: There is abdominal tenderness. There is left CVA tenderness.   Musculoskeletal:      Right lower leg: No edema.      Left lower leg: No edema.   Skin:     General: Skin is warm and dry.      Capillary Refill: Capillary refill takes less than 2 seconds.   Neurological:      General: No focal deficit present.      Mental Status: She is alert and oriented to person, place, and time.          Data: (All radiographs, tracings, PFTs, and imaging are personally viewed and interpreted unless otherwise noted)  Labs:   Recent Labs     10/11/24  1845   WBC 9.0   HGB 11.4*   HCT 36.4*        Recent Labs     10/11/24  1845   *   K 4.4   CL 93*   CO2 25   BUN 14   CREATININE 0.7   CALCIUM 10.1     Recent Labs     10/11/24  1845   AST 23   ALT 29   BILIDIR 0.2   BILITOT 0.3   ALKPHOS 71     No results for input(s): \"INR\" in the last 72 hours.  No results for input(s): \"CKTOTAL\", \"TROPONINI\" in the last 72 hours.  Urinalysis:   Lab Results   Component Value Date/Time    NITRU NEGATIVE 10/11/2024 06:50 PM    WBCUA 50-75 10/11/2024 06:50 PM    WBCUA 0-2 04/19/2012 10:10 AM    BACTERIA FEW 10/11/2024 06:50 PM    RBCUA 25-50 10/11/2024 06:50 PM    BLOODU MODERATE 10/11/2024 06:50 PM    GLUCOSEU NEGATIVE 10/11/2024 06:50 PM       Radiology:  CT ABDOMEN PELVIS W IV CONTRAST Additional Contrast? None   Final Result   1. Unchanged left renal subcapsular abscess.   2. Increased size of left posterior pararenal retroperitoneal abscess    status post drain removal.   3. Previously seen left psoas abscess not demonstrated. Minimal and    unchanged residual inflammatory changes along the posterior margin of the    psoas.      This document has been electronically

## 2024-10-12 NOTE — ED NOTES
Called [facility] and spoke with Jade who approved patient transport to [facility]. Patient is in stable condition.

## 2024-10-12 NOTE — ED NOTES
Pt resting on cot with call light within reach. Pt given blanket for comfort. Denies further needs. VSS. Respirations even and unlabored.

## 2024-10-12 NOTE — CARE COORDINATION
10/12/24 1338   Readmission Assessment   Number of Days since last admission? 8-30 days   Previous Disposition Other (comment)  ([facility])   Who is being Interviewed Patient   What was the patient's/caregiver's perception as to why they think they needed to return back to the hospital? Other (Comment)  (Tubes can out from kidneys)   Did you visit your Primary Care Physician after you left the hospital, before you returned this time? No   Why weren't you able to visit your PCP? Other (Comment)  (Saw speacialist)   Did you see a specialist, such as Cardiac, Pulmonary, Orthopedic Physician, etc. after you left the hospital? No   Who advised the patient to return to the hospital? Other (Comment)  (Niece)   Does the patient report anything that got in the way of taking their medications? No   In our efforts to provide the best possible care to you and others like you, can you think of anything that we could have done to help you after you left the hospital the first time, so that you might not have needed to return so soon? Other (Comment)  (No)     Patient Goals/Plan/Treatment Preferences: [place] needs unknown at this time.     If patient is discharged prior to next notation, then this note serves as note for discharge by case management.

## 2024-10-12 NOTE — ED NOTES
Pt requesting something for pain. VSS. Respirations even and unlabored. Denies further needs. Call light within reach.

## 2024-10-12 NOTE — PROGRESS NOTES
PROGRESS NOTE      Patient:  Elli Coulter      Unit/Bed:6K-03/003-A    YOB: 1955    MRN: [id number]       Acct: [id number]     PCP: Ryan Montalvo MD    Date of Admission: 10/11/2024    Anticipated Discharge in : pending clinical course    Assessment/Plan:    Left flank pain  Left Renal abscesses s/p Nephrostomy Tube placement  Nephrostomy Tube displacement, accidental  Pt presented with worsening left flank pain after accidental displacement of recently placed nephrostomy tubes prior to arrival.  Of note, pt was recently admitted (9/10-9/15) for Sepsis 2/2 renal abscess s/p Nephrostomy tube placement.  Urology was c/s during that admission and recommended Nephrectomy at tertiary care center.  10/11 CT A/P showed and unchanged Left renal subcapsular abscess and a left retroperitoneal abscess noted to be increased in size.  10/11 UA (+) mod blood, mod LE, RBCs and WBCs.  Urine Cx (p)   Started on Zosyn upon admission -- continue at this time.  10/12  IR c/s -- for replacement of nephrostomy tubes.  Awaiting further recs  Pt scheduled for Nephrectomy at [facility] on 10/17/24    IDDM2  On Lantus 14U and Humalog, Actos at home  Resumed Lantus 14U upon admission.  LD SSI  Hypoglycemia protocol in place.  Accu checks  Monitor daily BMP    COPD  Pt breathing well on RA -- not on O2 at baseline.    Continue home inhalers     Chronic HFpEF   Last Echo done on 1/10/24 showed EF 60% with G1DD.    Continue home meds    Hyponatremia, mild and improving   10/11  Na 131 >> Na 135 - improved  Monitor with daily BMP    Hypomagnesemia, improving   10/11 Mg 1.5 >> 10/12  Mg 1.8  Replacement protocol in place.   Monitor with daily BMP    Chronic normocytic anemia  Baseline Hb 9-11.  Hb ~11.5 during admission - stable.  No s/s of acute blood loss.   Continue to monitor with daily CBC    Insomnia   Continue home meds    Constipation  Continue home laxatives    Anxiety/Depression  Continue home

## 2024-10-12 NOTE — ED NOTES
ED to inpatient nurses report    Chief Complaint   Patient presents with    Abdominal Pain    Flank Pain      Present to ED from [facility]   LOC: alert and orientated to name, place, date  Vital signs   Vitals:    10/11/24 1910 10/11/24 1934 10/11/24 2036 10/11/24 2136   BP: (!) 163/102 (!) 144/86 (!) 142/84 (!) 148/78   Pulse: 80 76 76 78   Resp: 16 15 14 20   Temp:       TempSrc:       SpO2: 96% 97% 96% 97%   Weight:       Height:          Oxygen Baseline RA    Current needs required RA Bipap/Cpap No  LDAs:   Peripheral IV 10/11/24 Left Antecubital (Active)     Mobility: Requires assistance * 1  Pending ED orders: Zosyn due to unavailable in omni, pharmacy called.    Present condition: stable    C-SSRS Risk of Suicide: No Risk  Swallow Screening    Preferred Language: English     Electronically signed by Dominga Archibald RN on 10/11/2024 at 10:43 PM

## 2024-10-12 NOTE — ED NOTES
Pt medicated per MAR. Pt resting on cot with call light within reach. VSS. Respirations even and unlabored.

## 2024-10-13 PROBLEM — E78.5 HYPERLIPIDEMIA: Status: ACTIVE | Noted: 2024-02-01

## 2024-10-13 PROBLEM — E56.9 VITAMIN DEFICIENCY: Status: ACTIVE | Noted: 2024-10-13

## 2024-10-13 PROBLEM — E83.42 HYPOMAGNESEMIA: Status: ACTIVE | Noted: 2024-10-13

## 2024-10-13 PROBLEM — R82.71 BACTERIURIA: Status: ACTIVE | Noted: 2024-10-13

## 2024-10-13 PROBLEM — K59.00 CONSTIPATION: Status: ACTIVE | Noted: 2024-10-13

## 2024-10-13 LAB
ANION GAP SERPL CALC-SCNC: 12 MEQ/L (ref 8–16)
BASOPHILS ABSOLUTE: 0 THOU/MM3 (ref 0–0.1)
BASOPHILS NFR BLD AUTO: 0.4 %
BUN SERPL-MCNC: 16 MG/DL (ref 7–22)
CALCIUM SERPL-MCNC: 10.4 MG/DL (ref 8.5–10.5)
CHLORIDE SERPL-SCNC: 95 MEQ/L (ref 98–111)
CO2 SERPL-SCNC: 26 MEQ/L (ref 23–33)
CREAT SERPL-MCNC: 0.7 MG/DL (ref 0.4–1.2)
DEPRECATED RDW RBC AUTO: 47.7 FL (ref 35–45)
EKG ATRIAL RATE: 66 BPM
EKG P AXIS: 48 DEGREES
EKG P-R INTERVAL: 150 MS
EKG Q-T INTERVAL: 410 MS
EKG QRS DURATION: 88 MS
EKG QTC CALCULATION (BAZETT): 429 MS
EKG R AXIS: 0 DEGREES
EKG T AXIS: 51 DEGREES
EKG VENTRICULAR RATE: 66 BPM
EOSINOPHIL NFR BLD AUTO: 2.4 %
EOSINOPHILS ABSOLUTE: 0.2 THOU/MM3 (ref 0–0.4)
ERYTHROCYTE [DISTWIDTH] IN BLOOD BY AUTOMATED COUNT: 14.9 % (ref 11.5–14.5)
GFR SERPL CREATININE-BSD FRML MDRD: > 90 ML/MIN/1.73M2
GLUCOSE BLD STRIP.AUTO-MCNC: 173 MG/DL (ref 70–108)
GLUCOSE BLD STRIP.AUTO-MCNC: 179 MG/DL (ref 70–108)
GLUCOSE BLD STRIP.AUTO-MCNC: 203 MG/DL (ref 70–108)
GLUCOSE BLD STRIP.AUTO-MCNC: 218 MG/DL (ref 70–108)
GLUCOSE SERPL-MCNC: 186 MG/DL (ref 70–108)
HCT VFR BLD AUTO: 39.3 % (ref 37–47)
HGB BLD-MCNC: 12.2 GM/DL (ref 12–16)
IMM GRANULOCYTES # BLD AUTO: 0.05 THOU/MM3 (ref 0–0.07)
IMM GRANULOCYTES NFR BLD AUTO: 0.6 %
LYMPHOCYTES ABSOLUTE: 2.3 THOU/MM3 (ref 1–4.8)
LYMPHOCYTES NFR BLD AUTO: 26.2 %
MAGNESIUM SERPL-MCNC: 1.7 MG/DL (ref 1.6–2.4)
MCH RBC QN AUTO: 27 PG (ref 26–33)
MCHC RBC AUTO-ENTMCNC: 31 GM/DL (ref 32.2–35.5)
MCV RBC AUTO: 86.9 FL (ref 81–99)
MONOCYTES ABSOLUTE: 0.6 THOU/MM3 (ref 0.4–1.3)
MONOCYTES NFR BLD AUTO: 7.2 %
NEUTROPHILS ABSOLUTE: 5.6 THOU/MM3 (ref 1.8–7.7)
NEUTROPHILS NFR BLD AUTO: 63.2 %
NRBC BLD AUTO-RTO: 0 /100 WBC
PLATELET # BLD AUTO: 353 THOU/MM3 (ref 130–400)
PMV BLD AUTO: 9.4 FL (ref 9.4–12.4)
POTASSIUM SERPL-SCNC: 4.8 MEQ/L (ref 3.5–5.2)
RBC # BLD AUTO: 4.52 MILL/MM3 (ref 4.2–5.4)
SODIUM SERPL-SCNC: 133 MEQ/L (ref 135–145)
TROPONIN, HIGH SENSITIVITY: 13 NG/L (ref 0–12)
WBC # BLD AUTO: 8.9 THOU/MM3 (ref 4.8–10.8)

## 2024-10-13 PROCEDURE — 2580000003 HC RX 258

## 2024-10-13 PROCEDURE — 80048 BASIC METABOLIC PNL TOTAL CA: CPT

## 2024-10-13 PROCEDURE — 85025 COMPLETE CBC W/AUTO DIFF WBC: CPT

## 2024-10-13 PROCEDURE — 6360000002 HC RX W HCPCS

## 2024-10-13 PROCEDURE — 83735 ASSAY OF MAGNESIUM: CPT

## 2024-10-13 PROCEDURE — G0378 HOSPITAL OBSERVATION PER HR: HCPCS

## 2024-10-13 PROCEDURE — 6370000000 HC RX 637 (ALT 250 FOR IP)

## 2024-10-13 PROCEDURE — 93010 ELECTROCARDIOGRAM REPORT: CPT | Performed by: INTERNAL MEDICINE

## 2024-10-13 PROCEDURE — 96366 THER/PROPH/DIAG IV INF ADDON: CPT

## 2024-10-13 PROCEDURE — 2500000003 HC RX 250 WO HCPCS

## 2024-10-13 PROCEDURE — 93005 ELECTROCARDIOGRAM TRACING: CPT

## 2024-10-13 PROCEDURE — 99232 SBSQ HOSP IP/OBS MODERATE 35: CPT | Performed by: FAMILY MEDICINE

## 2024-10-13 PROCEDURE — 96372 THER/PROPH/DIAG INJ SC/IM: CPT

## 2024-10-13 PROCEDURE — 36415 COLL VENOUS BLD VENIPUNCTURE: CPT

## 2024-10-13 PROCEDURE — 82948 REAGENT STRIP/BLOOD GLUCOSE: CPT

## 2024-10-13 PROCEDURE — 84484 ASSAY OF TROPONIN QUANT: CPT

## 2024-10-13 RX ORDER — INSULIN LISPRO 100 [IU]/ML
0-8 INJECTION, SOLUTION INTRAVENOUS; SUBCUTANEOUS
Status: DISCONTINUED | OUTPATIENT
Start: 2024-10-13 | End: 2024-10-15 | Stop reason: HOSPADM

## 2024-10-13 RX ADMIN — Medication 2000 UNITS: at 10:12

## 2024-10-13 RX ADMIN — ENOXAPARIN SODIUM 40 MG: 40 INJECTION SUBCUTANEOUS at 10:18

## 2024-10-13 RX ADMIN — PANTOPRAZOLE SODIUM 40 MG: 40 TABLET, DELAYED RELEASE ORAL at 19:13

## 2024-10-13 RX ADMIN — FOLIC ACID 1 MG: 1 TABLET ORAL at 10:12

## 2024-10-13 RX ADMIN — PIPERACILLIN AND TAZOBACTAM 4500 MG: 4; .5 INJECTION, POWDER, FOR SOLUTION INTRAVENOUS at 12:12

## 2024-10-13 RX ADMIN — SODIUM CHLORIDE, PRESERVATIVE FREE 10 ML: 5 INJECTION INTRAVENOUS at 21:03

## 2024-10-13 RX ADMIN — INSULIN GLARGINE 14 UNITS: 100 INJECTION, SOLUTION SUBCUTANEOUS at 21:00

## 2024-10-13 RX ADMIN — MICONAZOLE NITRATE: 2 POWDER TOPICAL at 21:03

## 2024-10-13 RX ADMIN — FENOFIBRATE 160 MG: 160 TABLET ORAL at 10:12

## 2024-10-13 RX ADMIN — BUPROPION HYDROCHLORIDE 300 MG: 300 TABLET, EXTENDED RELEASE ORAL at 10:12

## 2024-10-13 RX ADMIN — CARVEDILOL 3.12 MG: 3.12 TABLET, FILM COATED ORAL at 10:12

## 2024-10-13 RX ADMIN — PIPERACILLIN AND TAZOBACTAM 3375 MG: 3; .375 INJECTION, POWDER, FOR SOLUTION INTRAVENOUS at 18:18

## 2024-10-13 RX ADMIN — TRAZODONE HYDROCHLORIDE 200 MG: 100 TABLET ORAL at 20:59

## 2024-10-13 RX ADMIN — ATORVASTATIN CALCIUM 40 MG: 40 TABLET, FILM COATED ORAL at 21:01

## 2024-10-13 RX ADMIN — LEVOTHYROXINE SODIUM 150 MCG: 0.15 TABLET ORAL at 07:44

## 2024-10-13 RX ADMIN — ONDANSETRON 4 MG: 4 TABLET, ORALLY DISINTEGRATING ORAL at 08:19

## 2024-10-13 RX ADMIN — FLUTICASONE PROPIONATE 1 SPRAY: 50 SPRAY, METERED NASAL at 20:59

## 2024-10-13 RX ADMIN — PANTOPRAZOLE SODIUM 40 MG: 40 TABLET, DELAYED RELEASE ORAL at 07:41

## 2024-10-13 RX ADMIN — SODIUM CHLORIDE, PRESERVATIVE FREE 10 ML: 5 INJECTION INTRAVENOUS at 10:12

## 2024-10-13 RX ADMIN — PYRIDOXINE HCL TAB 50 MG 50 MG: 50 TAB at 10:12

## 2024-10-13 RX ADMIN — DICLOFENAC SODIUM 4 G: 10 GEL TOPICAL at 21:00

## 2024-10-13 RX ADMIN — INSULIN LISPRO 2 UNITS: 100 INJECTION, SOLUTION INTRAVENOUS; SUBCUTANEOUS at 21:00

## 2024-10-13 RX ADMIN — HYDROCODONE BITARTRATE AND ACETAMINOPHEN 2 TABLET: 5; 325 TABLET ORAL at 19:13

## 2024-10-13 RX ADMIN — ESCITALOPRAM OXALATE 20 MG: 20 TABLET, FILM COATED ORAL at 10:12

## 2024-10-13 RX ADMIN — HYDROCODONE BITARTRATE AND ACETAMINOPHEN 2 TABLET: 5; 325 TABLET ORAL at 07:41

## 2024-10-13 RX ADMIN — CARVEDILOL 3.12 MG: 3.12 TABLET, FILM COATED ORAL at 21:01

## 2024-10-13 RX ADMIN — INSULIN LISPRO 2 UNITS: 100 INJECTION, SOLUTION INTRAVENOUS; SUBCUTANEOUS at 12:13

## 2024-10-13 RX ADMIN — HYDROCODONE BITARTRATE AND ACETAMINOPHEN 2 TABLET: 5; 325 TABLET ORAL at 12:24

## 2024-10-13 RX ADMIN — Medication 3 MG: at 21:00

## 2024-10-13 RX ADMIN — HYDROCODONE BITARTRATE AND ACETAMINOPHEN 2 TABLET: 5; 325 TABLET ORAL at 03:16

## 2024-10-13 ASSESSMENT — PAIN DESCRIPTION - DESCRIPTORS
DESCRIPTORS: ACHING
DESCRIPTORS: ACHING;DISCOMFORT
DESCRIPTORS: ACHING

## 2024-10-13 ASSESSMENT — PAIN DESCRIPTION - LOCATION
LOCATION: BACK

## 2024-10-13 ASSESSMENT — PAIN SCALES - GENERAL
PAINLEVEL_OUTOF10: 8
PAINLEVEL_OUTOF10: 7
PAINLEVEL_OUTOF10: 0

## 2024-10-13 ASSESSMENT — PAIN DESCRIPTION - ORIENTATION
ORIENTATION: LEFT
ORIENTATION: LOWER;LEFT
ORIENTATION: LOWER
ORIENTATION: LEFT

## 2024-10-13 NOTE — PROCEDURES
PROCEDURE NOTE  Date: 10/13/2024   Name: Elli Coulter  YOB: 1955    Procedures  EKG completed handed to Shara PUTNAM

## 2024-10-13 NOTE — PROGRESS NOTES
PROGRESS NOTE      Patient:  Elli Coulter  Unit/Bed:6-03/003-A  YOB: 1955  MRN: [id number]   Acct: [id number]    PCP: Ryan Montalvo MD    Date of Admission: 10/11/2024 LOS: 0    Date of Evaluation:  10/13/2024    Anticipated Discharge: 1-2 days    Assessment/Plan:    Displaced nephrostomy tube  - Accidental removal of nephrostomy tube by patient at home 10/5 (originally placed during admission 9/10 - 9/15) => patient reported to ED 10/11 for worsening left flank pain  - CT A/P 10/11 showed unchanged left renal subcapsular abscess and a left retroperitoneal abscess increased in size  - 10/11 UA moderate blood, moderate leukocyte esterase, RBCs and WBCs => urine culture growing E. coli with 10,000 CFU, sensitivities pending  - 10/13 discussed with IR Dr. Bowling, plans replacement 10/14 => hold Lovenox and n.p.o. at midnight    Recurrent left subcapsular and retroperitoneal abscess  -Was admitted 9/10 - 9/15 for sepsis due to recurrent left perinephric abscess (multiple admissions since 10/29/2023 => IR guided drain placement 9/11 per urology recommendation, treated with vancomycin and Zosyn => transferred to [facility] 9/15 for consideration of nephrectomy and was discharged with 14 days ciprofloxacin and scheduled for nephrectomy on 10/17  - CT A/P 10/11 revealed unchanged left renal subcapsular abscess, increased size of the left posterior pararenal retroperitoneal abscess s/p drain removal, previously seen left psoas abscess not demonstrated  - 10/13 discussed with IR Dr. Pablo, plans replacement 10/14 => hold Lovenox and n.p.o. at midnight  -Afebrile since admission with WBCs 8-9 but considering growth of perinephric abscess and nephrostomy tube insertion => started on Zosyn 10/12 to continue at least until nephrostomy tube replaced    Bacteriuria in the setting of perinephric abscess  -UA 10/11 moderate blood, moderate leukocyte esterase, few bacteria  - Urine culture 10/11

## 2024-10-13 NOTE — RT PROTOCOL NOTE
RT Nebulizer Bronchodilator Protocol Note    There is a bronchodilator order in the chart from a provider indicating to follow the RT Bronchodilator Protocol and there is an “Initiate RT Bronchodilator Protocol” order as well (see protocol at bottom of note).    CXR Findings:  No results found.    The findings from the last RT Protocol Assessment were as follows:  Smoking: Chronic pulmonary disease  Respiratory Pattern: Regular pattern and RR 12-20 bpm  Breath Sounds: Clear breath sounds  Cough: Strong, spontaneous, non-productive  Indication for Bronchodilator Therapy: On home bronchodilators (prn at home hardly takes treatments at home)  Bronchodilator Assessment Score: 2    Aerosolized bronchodilator medication orders have been revised according to the RT Nebulizer Bronchodilator Protocol below.    Respiratory Therapist to perform RT Therapy Protocol Assessment initially then follow the protocol.  Repeat RT Therapy Protocol Assessment PRN for score 0-3 or on second treatment, BID, and PRN for scores above 3.    No Indications - adjust the frequency to every 6 hours PRN wheezing or bronchospasm, if no treatments needed after 48 hours then discontinue using Per Protocol order mode.     If indication present, adjust the RT bronchodilator orders based on the Bronchodilator Assessment Score as indicated below.  If a patient is on this medication at home then do not decrease Frequency below that used at home.    0-3 - enter or revise RT bronchodilator order(s) to equivalent RT Bronchodilator order with Frequency of every 4 hours PRN for wheezing or increased work of breathing using Per Protocol order mode.       4-6 - enter or revise RT Bronchodilator order(s) to two equivalent RT bronchodilator orders with one order with BID Frequency and one order with Frequency of every 4 hours PRN wheezing or increased work of breathing using Per Protocol order mode.         7-10 - enter or revise RT Bronchodilator order(s) to two 
breathing using Per Protocol order mode.        4-6 - enter or revise RT Bronchodilator order(s) to two equivalent RT bronchodilator orders with one order with BID Frequency and one order with Frequency of every 4 hours PRN wheezing or increased work of breathing using Per Protocol order mode.        7-10 - enter or revise RT Bronchodilator order(s) to two equivalent RT bronchodilator orders with one order with TID Frequency and one order with Frequency of every 4 hours PRN wheezing or increased work of breathing using Per Protocol order mode.       11-13 - enter or revise RT Bronchodilator order(s) to one equivalent RT bronchodilator order with QID Frequency and an Albuterol order with Frequency of every 4 hours PRN wheezing or increased work of breathing using Per Protocol order mode.      Greater than 13 - enter or revise RT Bronchodilator order(s) to one equivalent RT bronchodilator order with every 4 hours Frequency and an Albuterol order with Frequency of every 2 hours PRN wheezing or increased work of breathing using Per Protocol order mode.     RT to enter RT Home Evaluation for COPD & MDI Assessment order using Per Protocol order mode.    Electronically signed by Dilan Welch RCP on 10/12/2024 at 3:17 PM

## 2024-10-14 ENCOUNTER — APPOINTMENT (OUTPATIENT)
Dept: CT IMAGING | Age: 69
End: 2024-10-14
Payer: MEDICARE

## 2024-10-14 LAB
ANION GAP SERPL CALC-SCNC: 16 MEQ/L (ref 8–16)
BACTERIA UR CULT: ABNORMAL
BASOPHILS ABSOLUTE: 0 THOU/MM3 (ref 0–0.1)
BASOPHILS NFR BLD AUTO: 0.3 %
BUN SERPL-MCNC: 13 MG/DL (ref 7–22)
CALCIUM SERPL-MCNC: 10.1 MG/DL (ref 8.5–10.5)
CHLORIDE SERPL-SCNC: 96 MEQ/L (ref 98–111)
CO2 SERPL-SCNC: 23 MEQ/L (ref 23–33)
CREAT SERPL-MCNC: 0.7 MG/DL (ref 0.4–1.2)
DEPRECATED RDW RBC AUTO: 47 FL (ref 35–45)
EOSINOPHIL NFR BLD AUTO: 1.1 %
EOSINOPHILS ABSOLUTE: 0.1 THOU/MM3 (ref 0–0.4)
ERYTHROCYTE [DISTWIDTH] IN BLOOD BY AUTOMATED COUNT: 14.8 % (ref 11.5–14.5)
GFR SERPL CREATININE-BSD FRML MDRD: > 90 ML/MIN/1.73M2
GLUCOSE BLD STRIP.AUTO-MCNC: 129 MG/DL (ref 70–108)
GLUCOSE BLD STRIP.AUTO-MCNC: 132 MG/DL (ref 70–108)
GLUCOSE BLD STRIP.AUTO-MCNC: 143 MG/DL (ref 70–108)
GLUCOSE BLD STRIP.AUTO-MCNC: 163 MG/DL (ref 70–108)
GLUCOSE BLD STRIP.AUTO-MCNC: 228 MG/DL (ref 70–108)
GLUCOSE SERPL-MCNC: 154 MG/DL (ref 70–108)
HCT VFR BLD AUTO: 35.8 % (ref 37–47)
HGB BLD-MCNC: 11.3 GM/DL (ref 12–16)
IMM GRANULOCYTES # BLD AUTO: 0.05 THOU/MM3 (ref 0–0.07)
IMM GRANULOCYTES NFR BLD AUTO: 0.6 %
INR PPP: 1.16 (ref 0.85–1.13)
LYMPHOCYTES ABSOLUTE: 1.9 THOU/MM3 (ref 1–4.8)
LYMPHOCYTES NFR BLD AUTO: 21.9 %
MCH RBC QN AUTO: 27.4 PG (ref 26–33)
MCHC RBC AUTO-ENTMCNC: 31.6 GM/DL (ref 32.2–35.5)
MCV RBC AUTO: 86.7 FL (ref 81–99)
MONOCYTES ABSOLUTE: 0.7 THOU/MM3 (ref 0.4–1.3)
MONOCYTES NFR BLD AUTO: 7.6 %
NEUTROPHILS ABSOLUTE: 6 THOU/MM3 (ref 1.8–7.7)
NEUTROPHILS NFR BLD AUTO: 68.5 %
NRBC BLD AUTO-RTO: 0 /100 WBC
ORGANISM: ABNORMAL
PLATELET # BLD AUTO: 354 THOU/MM3 (ref 130–400)
PMV BLD AUTO: 9.6 FL (ref 9.4–12.4)
POTASSIUM SERPL-SCNC: 4.2 MEQ/L (ref 3.5–5.2)
RBC # BLD AUTO: 4.13 MILL/MM3 (ref 4.2–5.4)
SODIUM SERPL-SCNC: 135 MEQ/L (ref 135–145)
T4 FREE SERPL-MCNC: 1.34 NG/DL (ref 0.93–1.68)
TSH SERPL DL<=0.005 MIU/L-ACNC: 4.66 UIU/ML (ref 0.4–4.2)
WBC # BLD AUTO: 8.8 THOU/MM3 (ref 4.8–10.8)

## 2024-10-14 PROCEDURE — 85025 COMPLETE CBC W/AUTO DIFF WBC: CPT

## 2024-10-14 PROCEDURE — 84443 ASSAY THYROID STIM HORMONE: CPT

## 2024-10-14 PROCEDURE — 87205 SMEAR GRAM STAIN: CPT

## 2024-10-14 PROCEDURE — 2580000003 HC RX 258

## 2024-10-14 PROCEDURE — 87075 CULTR BACTERIA EXCEPT BLOOD: CPT

## 2024-10-14 PROCEDURE — 87186 SC STD MICRODIL/AGAR DIL: CPT

## 2024-10-14 PROCEDURE — 97166 OT EVAL MOD COMPLEX 45 MIN: CPT

## 2024-10-14 PROCEDURE — G0378 HOSPITAL OBSERVATION PER HR: HCPCS

## 2024-10-14 PROCEDURE — 85610 PROTHROMBIN TIME: CPT

## 2024-10-14 PROCEDURE — 99232 SBSQ HOSP IP/OBS MODERATE 35: CPT | Performed by: FAMILY MEDICINE

## 2024-10-14 PROCEDURE — 97535 SELF CARE MNGMENT TRAINING: CPT

## 2024-10-14 PROCEDURE — 80048 BASIC METABOLIC PNL TOTAL CA: CPT

## 2024-10-14 PROCEDURE — 2709999900 CT DRAINAGE HEMATOMA/SEROMA/FLUID COLLECTION

## 2024-10-14 PROCEDURE — 87077 CULTURE AEROBIC IDENTIFY: CPT

## 2024-10-14 PROCEDURE — 6370000000 HC RX 637 (ALT 250 FOR IP)

## 2024-10-14 PROCEDURE — 96366 THER/PROPH/DIAG IV INF ADDON: CPT

## 2024-10-14 PROCEDURE — 84439 ASSAY OF FREE THYROXINE: CPT

## 2024-10-14 PROCEDURE — 36415 COLL VENOUS BLD VENIPUNCTURE: CPT

## 2024-10-14 PROCEDURE — 82948 REAGENT STRIP/BLOOD GLUCOSE: CPT

## 2024-10-14 PROCEDURE — 2500000003 HC RX 250 WO HCPCS

## 2024-10-14 PROCEDURE — 87070 CULTURE OTHR SPECIMN AEROBIC: CPT

## 2024-10-14 PROCEDURE — 6360000002 HC RX W HCPCS: Performed by: RADIOLOGY

## 2024-10-14 PROCEDURE — 6360000002 HC RX W HCPCS

## 2024-10-14 RX ORDER — FENTANYL CITRATE 50 UG/ML
INJECTION, SOLUTION INTRAMUSCULAR; INTRAVENOUS PRN
Status: COMPLETED | OUTPATIENT
Start: 2024-10-14 | End: 2024-10-14

## 2024-10-14 RX ORDER — SENNOSIDES A AND B 8.6 MG/1
1 TABLET, FILM COATED ORAL NIGHTLY
Status: DISCONTINUED | OUTPATIENT
Start: 2024-10-14 | End: 2024-10-15 | Stop reason: HOSPADM

## 2024-10-14 RX ORDER — MIDAZOLAM HYDROCHLORIDE 1 MG/ML
INJECTION INTRAMUSCULAR; INTRAVENOUS PRN
Status: COMPLETED | OUTPATIENT
Start: 2024-10-14 | End: 2024-10-14

## 2024-10-14 RX ADMIN — DICLOFENAC SODIUM 4 G: 10 GEL TOPICAL at 20:24

## 2024-10-14 RX ADMIN — PANTOPRAZOLE SODIUM 40 MG: 40 TABLET, DELAYED RELEASE ORAL at 15:58

## 2024-10-14 RX ADMIN — MICONAZOLE NITRATE: 2 POWDER TOPICAL at 08:42

## 2024-10-14 RX ADMIN — SODIUM CHLORIDE, PRESERVATIVE FREE 10 ML: 5 INJECTION INTRAVENOUS at 20:24

## 2024-10-14 RX ADMIN — TRAZODONE HYDROCHLORIDE 200 MG: 100 TABLET ORAL at 21:55

## 2024-10-14 RX ADMIN — HYDROCODONE BITARTRATE AND ACETAMINOPHEN 2 TABLET: 5; 325 TABLET ORAL at 10:35

## 2024-10-14 RX ADMIN — HYDROCODONE BITARTRATE AND ACETAMINOPHEN 2 TABLET: 5; 325 TABLET ORAL at 20:21

## 2024-10-14 RX ADMIN — FENOFIBRATE 160 MG: 160 TABLET ORAL at 08:41

## 2024-10-14 RX ADMIN — HYDROCODONE BITARTRATE AND ACETAMINOPHEN 2 TABLET: 5; 325 TABLET ORAL at 15:58

## 2024-10-14 RX ADMIN — FOLIC ACID 1 MG: 1 TABLET ORAL at 08:41

## 2024-10-14 RX ADMIN — LEVOTHYROXINE SODIUM 75 MCG: 0.07 TABLET ORAL at 06:09

## 2024-10-14 RX ADMIN — PANTOPRAZOLE SODIUM 40 MG: 40 TABLET, DELAYED RELEASE ORAL at 06:08

## 2024-10-14 RX ADMIN — BUPROPION HYDROCHLORIDE 300 MG: 300 TABLET, EXTENDED RELEASE ORAL at 08:41

## 2024-10-14 RX ADMIN — Medication 2000 UNITS: at 08:41

## 2024-10-14 RX ADMIN — AMOXICILLIN AND CLAVULANATE POTASSIUM 1 TABLET: 875; 125 TABLET, FILM COATED ORAL at 20:20

## 2024-10-14 RX ADMIN — CARVEDILOL 3.12 MG: 3.12 TABLET, FILM COATED ORAL at 20:20

## 2024-10-14 RX ADMIN — INSULIN GLARGINE 14 UNITS: 100 INJECTION, SOLUTION SUBCUTANEOUS at 20:28

## 2024-10-14 RX ADMIN — ESCITALOPRAM OXALATE 20 MG: 20 TABLET, FILM COATED ORAL at 08:41

## 2024-10-14 RX ADMIN — FLUTICASONE PROPIONATE 1 SPRAY: 50 SPRAY, METERED NASAL at 20:24

## 2024-10-14 RX ADMIN — Medication 3 MG: at 20:20

## 2024-10-14 RX ADMIN — FERROUS SULFATE TAB 325 MG (65 MG ELEMENTAL FE) 325 MG: 325 (65 FE) TAB at 08:41

## 2024-10-14 RX ADMIN — SODIUM CHLORIDE, PRESERVATIVE FREE 10 ML: 5 INJECTION INTRAVENOUS at 08:40

## 2024-10-14 RX ADMIN — FLUTICASONE PROPIONATE 1 SPRAY: 50 SPRAY, METERED NASAL at 08:41

## 2024-10-14 RX ADMIN — MIDAZOLAM 1 MG: 1 INJECTION INTRAMUSCULAR; INTRAVENOUS at 14:23

## 2024-10-14 RX ADMIN — ATORVASTATIN CALCIUM 40 MG: 40 TABLET, FILM COATED ORAL at 20:20

## 2024-10-14 RX ADMIN — HYDROCODONE BITARTRATE AND ACETAMINOPHEN 2 TABLET: 5; 325 TABLET ORAL at 06:08

## 2024-10-14 RX ADMIN — PYRIDOXINE HCL TAB 50 MG 50 MG: 50 TAB at 08:41

## 2024-10-14 RX ADMIN — MICONAZOLE NITRATE: 2 POWDER TOPICAL at 20:24

## 2024-10-14 RX ADMIN — PIPERACILLIN AND TAZOBACTAM 3375 MG: 3; .375 INJECTION, POWDER, FOR SOLUTION INTRAVENOUS at 10:42

## 2024-10-14 RX ADMIN — FENTANYL CITRATE 50 MCG: 50 INJECTION, SOLUTION INTRAMUSCULAR; INTRAVENOUS at 14:23

## 2024-10-14 RX ADMIN — PIPERACILLIN AND TAZOBACTAM 3375 MG: 3; .375 INJECTION, POWDER, FOR SOLUTION INTRAVENOUS at 02:08

## 2024-10-14 ASSESSMENT — PAIN DESCRIPTION - ORIENTATION
ORIENTATION: LEFT
ORIENTATION: LEFT

## 2024-10-14 ASSESSMENT — PAIN DESCRIPTION - DESCRIPTORS
DESCRIPTORS: ACHING
DESCRIPTORS: DISCOMFORT
DESCRIPTORS: DISCOMFORT

## 2024-10-14 ASSESSMENT — PAIN SCALES - GENERAL
PAINLEVEL_OUTOF10: 8
PAINLEVEL_OUTOF10: 9
PAINLEVEL_OUTOF10: 0
PAINLEVEL_OUTOF10: 8

## 2024-10-14 ASSESSMENT — PAIN SCALES - WONG BAKER: WONGBAKER_NUMERICALRESPONSE: NO HURT

## 2024-10-14 ASSESSMENT — PAIN DESCRIPTION - LOCATION
LOCATION: BACK
LOCATION: ABDOMEN;FLANK
LOCATION: ABDOMEN
LOCATION: ABDOMEN;FLANK

## 2024-10-14 ASSESSMENT — PAIN - FUNCTIONAL ASSESSMENT: PAIN_FUNCTIONAL_ASSESSMENT: ACTIVITIES ARE NOT PREVENTED

## 2024-10-14 ASSESSMENT — PAIN DESCRIPTION - PAIN TYPE: TYPE: ACUTE PAIN

## 2024-10-14 NOTE — PROGRESS NOTES
1412 Patient received in CT scanning for Left perinephric abscess drain insertion. Monitor applied.   1412 Dr. Pablo here; spoke to patient.   1413 This procedure has been fully reviewed with the patient and written informed consent has been obtained.   1418 Patient assisted to CT table and pre scan started.   1424 Procedure started with Dr. Pablo  1428 Samples collected and sent to lab for further review.  10 ml of purulent blood tinged fluid sent, attached to accordion bag  1429 Procedure completed; patient tolerated well. Post scan obtained.   1430 Patient on bed; comfort ensured.  1438 Report called to Aarti PUTNAM and patient taken to 6K via bed/transport with family at bedside. Pt alert and oriented x3; follows commands. Skin pink, warm, and dry. Respirations easy, regular, and nonlabored.

## 2024-10-14 NOTE — PROGRESS NOTES
[facility]  INPATIENT OCCUPATIONAL THERAPY  STRZ RENAL TELEMETRY 6K  EVALUATION      Discharge Recommendations:  (safe to return to Assisted Living from therapy standpoint)  Equipment Recommendations: No        Time In: 1145  Time Out: 1202  Timed Code Treatment Minutes: 8 Minutes  Minutes: 17          Date: 10/14/2024  Patient Name: Elli Coulter,   Gender: female      MRN: [id number]  : 1955  (69 y.o.)  Referring Practitioner: Eda Martinez MD  Diagnosis: Nephrostomy tube displaced (HCC)  Additional Pertinent Hx: Per H&P: \" Elli Coulter is a 69 y.o. female with PMHx of COPD, DM2, renal abscesses, GERD who presented to [facility] with chief complaint of left sided abdominal and flank pain. The patient had nephrostomy tube placed secondary to posterior pararenal retroperitoneal abscess. She says that she is scheduled for left nephrectomy on 10/17 at [facility].  She reports that she accidentally dislodged her nephrostomy tube on 10/5 when she stood up and became entangled in the tubing.  She developed the pain a day or two ago but tonight it became so severe that she came to the emergency department for further evaluation. Displaced nephrostomy tube  - Accidental removal of nephrostomy tube by patient at home 10/5 (originally placed during admission 9/10 - 9/15) => patient reported to ED 10/11 for worsening left flank pain    Restrictions/Precautions:  Restrictions/Precautions: Fall Risk, Up as Tolerated    Subjective  Chart Reviewed: Yes, Orders, Progress Notes    Subjective: Nurse approved OT eval. Pt in bed on OT eval. Pt in bed on OT arrival, pleasant and cooperative. Pt is agreeable to OOB activity.    Pain: does not c/o pain     Vitals: Vitals not assessed per clinical judgement, see nursing flowsheet    Social/Functional History:  Lives With: Alone  Type of Home: Assisted living  Home Layout: One level  Home Equipment: Wheelchair - Manual   Bathroom Equipment: Shower chair

## 2024-10-14 NOTE — DISCHARGE INSTR - COC
Continuity of Care Form    Patient Name: Elli Coulter   :  1955  MRN:  [id number]    Admit date:  10/11/2024  Discharge date:  10/16/2024    Code Status Order: Full Code   Advance Directives:   Advance Care Flowsheet Documentation             Admitting Physician:  No admitting provider for patient encounter.  PCP: Ryan Montalvo MD    Discharging Nurse: Shara Navarrete RN  Discharging Hospital Unit/Room#: 6K-03/003-A  Discharging Unit Phone Number: [phone]    Emergency Contact:   Extended Emergency Contact Information  Primary Emergency Contact: SREEKANTH BAEZ  Address: [address]  Home Phone: [phone]  Relation: Niece/Nephew  Secondary Emergency Contact: AndreaseufemiaBailey   [facility]  Home Phone: [phone]  Mobile Phone: [phone]  Relation: Brother/Sister  Hearing or visual needs: None  Other needs: None  Preferred language: English   needed? No    Past Surgical History:  Past Surgical History:   Procedure Laterality Date    ABDOMEN SURGERY      x4 removed cysts and ovary removed    CARDIAC SURGERY      heart caths, no stents    CARPAL TUNNEL RELEASE Bilateral 2016    CHOLECYSTECTOMY, LAPAROSCOPIC  6/12/15    Dr. Huff    COLONOSCOPY      CT DRAIN SOFT TISSUE ABSCESS  2024    CT DRAIN SOFT TISSUE ABSCESS 2024 [facility] CT SCAN    CYSTOSCOPY N/A 2/10/2022    CYSTOSCOPY URETHRAL IMPLANT INJECTION performed by Sami Lanier MD at [facility] OR    CYSTOSCOPY N/A 2022    Cystoscopy Bladder Botox 200 units Left Stent Placement performed by Sami Lanier MD at [facility] OR    CYSTOSCOPY N/A 2023    CYSTOSCOPY WITH BLADDER BOTOX 200 UNITS performed by Sami Lanier MD at [facility] OR    CYSTOSCOPY W BIOPSY OF BLADDER N/A 2020    CYSTOSCOPY WITH BLADDER BIOPSIES performed by Chris Avila MD at [facility] OR    DILATATION, ESOPHAGUS      EGD      ELBOW SURGERY Right 10/7/2004    Dr. Mehrdad CASAS

## 2024-10-14 NOTE — CARE COORDINATION
10/14/24, 1:28 PM EDT  Discharge Planning Evaluation  Social work consult received, patient from [facility].    Patient/Family preference is to return to [facility].    The patient's current payor source at the facility is medicaid.   Medicare skilled days available: yes  Medicare does the patient have a three midnight qualifying stay? N/a  Insurance precert:  only if skilled need  Spoke with Elliot at the facility.  Patient bed hold: yes  Anticipated transport plan: family  Patient's Healthcare Decision Maker: Named in Scanned ACP Document    SW spoke with Patient about returning and she indicated that she would have family to call for transport. Patient is a possible return tomorrow.

## 2024-10-14 NOTE — PROGRESS NOTES
PROGRESS NOTE      Patient:  Elli Coulter      Unit/Bed:6K-03/003-A    YOB: 1955    MRN: [id number]       Acct: [id number]     PCP: Ryan Montalvo MD    Date of Admission: 10/11/2024    Anticipated Discharge in : pending clinical course    Assessment/Plan:    Left-sided flank pain  Chronic Left Renal abscesses s/p IR Tube placement  Nephrostomy/Abscess drainage Tube displacement, accidental  Pt presented with worsening left flank pain after accidental displacement of recently placed nephrostomy tubes prior to arrival.  Of note, pt was recently admitted (9/10-9/15) for Sepsis 2/2 renal abscess s/p Nephrostomy/drainage tube placement.  Urology was c/s during that admission and recommended Nephrectomy at tertiary care center.  10/11 CT A/P showed and unchanged Left renal subcapsular abscess and a left retroperitoneal abscess noted to be increased in size.  10/11 UA (+) mod blood, mod LE, RBCs and WBCs.  Started on Zosyn upon admission  10/14  Pt s/p IR drain placement for recurrent abscesses.  10/14  Urine Cx (+) E. Coli ---> switched to PO Augmentin post-procedure.  Of note, pt scheduled for Nephrectomy at [facility] on 11/14/24    Bacteriuria, in the setting of perinephric abscess  UA (+) and Urine Cx (+) E coli ---> pt initially started on IV Zosyn, however 10/14 with culture sensistivities resulting, pt switched to PO Augmentin post- IR procedure  10/14  pt reports improvement in symtpoms overall  Continue to monitor CBC in AM    Chest Pressure, improved  Non-obstructive CAD  10/13  pt reports symtpoms of chest pressure.  Further cardiac workup with Trop (13) and EKG (NSR) showed low concerns  for ACS  Stress test (4/2024) (-) for ischemia  Last [facility] (2017) showed 20-25% stenosis.  If recurs, STAT EKG and trop.    Monitor Telemetry and daily labs.    IDDM2  On Lantus 14U and Humalog, Actos at home  Resumed Lantus 14U upon admission.  LD SSI  Hypoglycemia protocol in place.  Accu

## 2024-10-14 NOTE — PROGRESS NOTES
[facility]  PHYSICAL THERAPY MISSED TREATMENT NOTE  STRZ RENAL TELEMETRY 6K    Date: 10/14/2024  Patient Name: Elli Coulter        MRN: [id number]   : 1955  (69 y.o.)  Gender: female                REASON FOR MISSED TREATMENT:  Pt had procedure to place a perinephric drain this afternoon and is too sore to try activity at this time and requested to wait until tomorrow.      Radha Cisneros, MPT [id number]

## 2024-10-15 ENCOUNTER — APPOINTMENT (OUTPATIENT)
Dept: INTERVENTIONAL RADIOLOGY/VASCULAR | Age: 69
End: 2024-10-15
Payer: MEDICARE

## 2024-10-15 VITALS
RESPIRATION RATE: 16 BRPM | WEIGHT: 178.4 LBS | HEART RATE: 68 BPM | BODY MASS INDEX: 30.46 KG/M2 | DIASTOLIC BLOOD PRESSURE: 70 MMHG | OXYGEN SATURATION: 97 % | SYSTOLIC BLOOD PRESSURE: 121 MMHG | TEMPERATURE: 97.3 F | HEIGHT: 64 IN

## 2024-10-15 LAB
ANION GAP SERPL CALC-SCNC: 12 MEQ/L (ref 8–16)
BASOPHILS ABSOLUTE: 0 THOU/MM3 (ref 0–0.1)
BASOPHILS NFR BLD AUTO: 0.5 %
BUN SERPL-MCNC: 14 MG/DL (ref 7–22)
CALCIUM SERPL-MCNC: 10 MG/DL (ref 8.5–10.5)
CHLORIDE SERPL-SCNC: 101 MEQ/L (ref 98–111)
CO2 SERPL-SCNC: 24 MEQ/L (ref 23–33)
CREAT SERPL-MCNC: 0.7 MG/DL (ref 0.4–1.2)
DEPRECATED RDW RBC AUTO: 46.8 FL (ref 35–45)
EOSINOPHIL NFR BLD AUTO: 2.2 %
EOSINOPHILS ABSOLUTE: 0.2 THOU/MM3 (ref 0–0.4)
ERYTHROCYTE [DISTWIDTH] IN BLOOD BY AUTOMATED COUNT: 14.8 % (ref 11.5–14.5)
GFR SERPL CREATININE-BSD FRML MDRD: > 90 ML/MIN/1.73M2
GLUCOSE BLD STRIP.AUTO-MCNC: 199 MG/DL (ref 70–108)
GLUCOSE BLD STRIP.AUTO-MCNC: 225 MG/DL (ref 70–108)
GLUCOSE SERPL-MCNC: 177 MG/DL (ref 70–108)
HCT VFR BLD AUTO: 35.4 % (ref 37–47)
HGB BLD-MCNC: 11 GM/DL (ref 12–16)
IMM GRANULOCYTES # BLD AUTO: 0.04 THOU/MM3 (ref 0–0.07)
IMM GRANULOCYTES NFR BLD AUTO: 0.5 %
LYMPHOCYTES ABSOLUTE: 2.5 THOU/MM3 (ref 1–4.8)
LYMPHOCYTES NFR BLD AUTO: 31.7 %
MCH RBC QN AUTO: 26.8 PG (ref 26–33)
MCHC RBC AUTO-ENTMCNC: 31.1 GM/DL (ref 32.2–35.5)
MCV RBC AUTO: 86.1 FL (ref 81–99)
MONOCYTES ABSOLUTE: 0.6 THOU/MM3 (ref 0.4–1.3)
MONOCYTES NFR BLD AUTO: 8.1 %
NEUTROPHILS ABSOLUTE: 4.6 THOU/MM3 (ref 1.8–7.7)
NEUTROPHILS NFR BLD AUTO: 57 %
NRBC BLD AUTO-RTO: 0 /100 WBC
PLATELET # BLD AUTO: 334 THOU/MM3 (ref 130–400)
PMV BLD AUTO: 9.3 FL (ref 9.4–12.4)
POTASSIUM SERPL-SCNC: 4.1 MEQ/L (ref 3.5–5.2)
RBC # BLD AUTO: 4.11 MILL/MM3 (ref 4.2–5.4)
SODIUM SERPL-SCNC: 137 MEQ/L (ref 135–145)
WBC # BLD AUTO: 8 THOU/MM3 (ref 4.8–10.8)

## 2024-10-15 PROCEDURE — 80048 BASIC METABOLIC PNL TOTAL CA: CPT

## 2024-10-15 PROCEDURE — 2580000003 HC RX 258

## 2024-10-15 PROCEDURE — 6370000000 HC RX 637 (ALT 250 FOR IP)

## 2024-10-15 PROCEDURE — G0378 HOSPITAL OBSERVATION PER HR: HCPCS

## 2024-10-15 PROCEDURE — 82948 REAGENT STRIP/BLOOD GLUCOSE: CPT

## 2024-10-15 PROCEDURE — 85025 COMPLETE CBC W/AUTO DIFF WBC: CPT

## 2024-10-15 PROCEDURE — 36415 COLL VENOUS BLD VENIPUNCTURE: CPT

## 2024-10-15 RX ORDER — FERROUS SULFATE 325(65) MG
325 TABLET ORAL
DISCHARGE
Start: 2024-10-15

## 2024-10-15 RX ORDER — HYDROCODONE BITARTRATE AND ACETAMINOPHEN 5; 325 MG/1; MG/1
1 TABLET ORAL EVERY 4 HOURS PRN
Status: SHIPPED | DISCHARGE
Start: 2024-10-15 | End: 2024-10-18

## 2024-10-15 RX ADMIN — BUPROPION HYDROCHLORIDE 300 MG: 300 TABLET, EXTENDED RELEASE ORAL at 08:15

## 2024-10-15 RX ADMIN — LEVOTHYROXINE SODIUM 75 MCG: 0.07 TABLET ORAL at 05:53

## 2024-10-15 RX ADMIN — FENOFIBRATE 160 MG: 160 TABLET ORAL at 08:15

## 2024-10-15 RX ADMIN — Medication 2000 UNITS: at 08:16

## 2024-10-15 RX ADMIN — HYDROCODONE BITARTRATE AND ACETAMINOPHEN 2 TABLET: 5; 325 TABLET ORAL at 08:15

## 2024-10-15 RX ADMIN — PYRIDOXINE HCL TAB 50 MG 50 MG: 50 TAB at 08:15

## 2024-10-15 RX ADMIN — SODIUM CHLORIDE, PRESERVATIVE FREE 10 ML: 5 INJECTION INTRAVENOUS at 08:16

## 2024-10-15 RX ADMIN — FOLIC ACID 1 MG: 1 TABLET ORAL at 08:15

## 2024-10-15 RX ADMIN — AMOXICILLIN AND CLAVULANATE POTASSIUM 1 TABLET: 875; 125 TABLET, FILM COATED ORAL at 08:15

## 2024-10-15 RX ADMIN — MICONAZOLE NITRATE: 2 POWDER TOPICAL at 08:16

## 2024-10-15 RX ADMIN — PANTOPRAZOLE SODIUM 40 MG: 40 TABLET, DELAYED RELEASE ORAL at 05:53

## 2024-10-15 RX ADMIN — CARVEDILOL 3.12 MG: 3.12 TABLET, FILM COATED ORAL at 08:15

## 2024-10-15 RX ADMIN — FLUTICASONE PROPIONATE 1 SPRAY: 50 SPRAY, METERED NASAL at 08:16

## 2024-10-15 RX ADMIN — ESCITALOPRAM OXALATE 20 MG: 20 TABLET, FILM COATED ORAL at 08:15

## 2024-10-15 RX ADMIN — HYDROCODONE BITARTRATE AND ACETAMINOPHEN 2 TABLET: 5; 325 TABLET ORAL at 03:52

## 2024-10-15 ASSESSMENT — PAIN SCALES - GENERAL
PAINLEVEL_OUTOF10: 8
PAINLEVEL_OUTOF10: 10

## 2024-10-15 ASSESSMENT — PAIN DESCRIPTION - LOCATION
LOCATION: BACK
LOCATION: ABDOMEN;FLANK

## 2024-10-15 ASSESSMENT — PAIN DESCRIPTION - DESCRIPTORS
DESCRIPTORS: DISCOMFORT
DESCRIPTORS: DISCOMFORT

## 2024-10-15 ASSESSMENT — PAIN DESCRIPTION - ORIENTATION
ORIENTATION: LEFT
ORIENTATION: LEFT

## 2024-10-15 ASSESSMENT — PAIN - FUNCTIONAL ASSESSMENT: PAIN_FUNCTIONAL_ASSESSMENT: ACTIVITIES ARE NOT PREVENTED

## 2024-10-15 ASSESSMENT — PAIN SCALES - WONG BAKER: WONGBAKER_NUMERICALRESPONSE: NO HURT

## 2024-10-15 NOTE — DISCHARGE SUMMARY
DISCHARGE SUMMARY      Patient Identification:   Elli Coulter   : 1955  MRN: [id number]   Account: [id number]      Patient's PCP: Ryan Montalvo MD    Admit Date: 10/11/2024     Discharge Date: 10/15/2024  10/15/24    Admitting Physician: No admitting provider for patient encounter.     Discharge Physician: Wendy Spear MD     Discharge Diagnoses:  The patient was seen and examined on day of discharge and this discharge summary is in conjunction with any daily progress note from day of discharge.    Left-sided flank pain  Chronic Left Renal abscesses s/p IR Tube placement  Nephrostomy/Abscess drainage Tube displacement, accidental  Bacteriuria, in the setting of perinephric abscess  Chest Pressure, improved  Non-obstructive CAD  IDDM2  COPD  Chronic HFpEF   Hyponatremia, mild and improved  Hypomagnesemia, improved  Chronic normocytic anemia, stable  Insomnia   Chronic Constipation  Anxiety/Depression  HLD/Hypertrigs  Hypothyroidism  GERD  Vitamin deficiency  Chronic Smoker  Overweight  Deconditioning    Hospital Course:   Elli Coulter is a 69 y.o. female admitted to [facility] on 10/11/2024 for left flank pain 2/2 chrnoic recurrent renal abscesses s/p drain place at her last hospital stay (9/10-9/15), that she accidentally displaced and presented with worsening of symptoms.        During hospital stay, pt underwent success IR procedure -- re-placement of abscess drain tubes done on 10/14.  Tube draining well.  No concerns noted.  Pt scheduled for Nephrectomy with Dr. Chavez at [facility] on 24.  Follow up as scheduled.     Hospital course also complicated with UTI (+) E coli.  Treated with IV Zosyn initially --> however, once Cx sensitivities resulted, switched to PO Augmentin --> to be continued upon discharge for 13 more doses for a total course of 10 days.    Pt is stable for discharge on 10/15.     Exam:     Vitals:  Vitals:    10/15/24 0349 10/15/24 0352 10/15/24

## 2024-10-15 NOTE — CARE COORDINATION
10/15/24, 11:01 AM EDT    Patient goals/plan/ treatment preferences discussed by  and .  Patient goals/plan/ treatment preferences reviewed with patient/ family.  Patient/ family verbalize understanding of discharge plan and are in agreement with goal/plan/treatment preferences.  Understanding was demonstrated using the teach back method.  AVS provided by RN at time of discharge, which includes all necessary medical information pertaining to the patients current course of illness, treatment, post-discharge goals of care, and treatment preferences.     Services At/After Discharge: Skilled Nursing Facility (SNF)    Patient returning to [facility] under her medicaid. ROBERTO spoke with gamal Garcia who will be here within the hour to transport Patient back to the facility. RN updated and blue envelope on chart.

## 2024-10-15 NOTE — PROGRESS NOTES
Report called to Larissa heredia [facility]. AVS and last dose MAR faxed to facility. Niece set to transport patient around 12pm.

## 2024-10-15 NOTE — PLAN OF CARE
Problem: Chronic Conditions and Co-morbidities  Goal: Patient's chronic conditions and co-morbidity symptoms are monitored and maintained or improved  Outcome: Progressing  Flowsheets (Taken 10/12/2024 0340)  Care Plan - Patient's Chronic Conditions and Co-Morbidity Symptoms are Monitored and Maintained or Improved:   Monitor and assess patient's chronic conditions and comorbid symptoms for stability, deterioration, or improvement   Collaborate with multidisciplinary team to address chronic and comorbid conditions and prevent exacerbation or deterioration   Update acute care plan with appropriate goals if chronic or comorbid symptoms are exacerbated and prevent overall improvement and discharge     Problem: Pain  Goal: Verbalizes/displays adequate comfort level or baseline comfort level  Outcome: Progressing  Flowsheets (Taken 10/12/2024 0340)  Verbalizes/displays adequate comfort level or baseline comfort level:   Encourage patient to monitor pain and request assistance   Assess pain using appropriate pain scale   Administer analgesics based on type and severity of pain and evaluate response     Problem: ABCDS Injury Assessment  Goal: Absence of physical injury  Outcome: Progressing  Flowsheets (Taken 10/12/2024 0340)  Absence of Physical Injury: Implement safety measures based on patient assessment     
  Problem: Chronic Conditions and Co-morbidities  Goal: Patient's chronic conditions and co-morbidity symptoms are monitored and maintained or improved  Outcome: Progressing  Flowsheets (Taken 10/13/2024 1740)  Care Plan - Patient's Chronic Conditions and Co-Morbidity Symptoms are Monitored and Maintained or Improved:   Monitor and assess patient's chronic conditions and comorbid symptoms for stability, deterioration, or improvement   Collaborate with multidisciplinary team to address chronic and comorbid conditions and prevent exacerbation or deterioration     Problem: Discharge Planning  Goal: Discharge to home or other facility with appropriate resources  Outcome: Progressing  Flowsheets (Taken 10/13/2024 1740)  Discharge to home or other facility with appropriate resources: Identify barriers to discharge with patient and caregiver     Problem: Pain  Goal: Verbalizes/displays adequate comfort level or baseline comfort level  Outcome: Progressing  Flowsheets (Taken 10/13/2024 1740)  Verbalizes/displays adequate comfort level or baseline comfort level:   Encourage patient to monitor pain and request assistance   Assess pain using appropriate pain scale     Problem: Skin/Tissue Integrity  Goal: Absence of new skin breakdown  Description: 1.  Monitor for areas of redness and/or skin breakdown  2.  Assess vascular access sites hourly  3.  Every 4-6 hours minimum:  Change oxygen saturation probe site  4.  Every 4-6 hours:  If on nasal continuous positive airway pressure, respiratory therapy assess nares and determine need for appliance change or resting period.  Outcome: Progressing  Note: No new signs or symptoms of skin breakdown noted this shift, encouraging patient to turn and reposition self in bed q2h       Problem: Safety - Adult  Goal: Free from fall injury  Outcome: Progressing  Note: No falls noted this shift. Continue falling star program. Bed alarm on, bed in low position. Call light and personal belongings 
  Problem: Discharge Planning  Goal: Discharge to home or other facility with appropriate resources  Outcome: Progressing  Flowsheets (Taken 10/14/2024 1342)  Discharge to home or other facility with appropriate resources: Identify barriers to discharge with patient and caregiver  Note: Return to [facility], see  notes 10/14/24.     
  Problem: Respiratory - Adult  Goal: Lung sounds clear or within normal limits for patient  Outcome: Progressing   Patient scored Q4 PRN per protocol for breathing treatments. Patient mutually agrees with goal of care   
0340)  Verbalizes/displays adequate comfort level or baseline comfort level:   Encourage patient to monitor pain and request assistance   Assess pain using appropriate pain scale   Administer analgesics based on type and severity of pain and evaluate response     Problem: Skin/Tissue Integrity  Goal: Absence of new skin breakdown  Description: 1.  Monitor for areas of redness and/or skin breakdown  2.  Assess vascular access sites hourly  3.  Every 4-6 hours minimum:  Change oxygen saturation probe site  4.  Every 4-6 hours:  If on nasal continuous positive airway pressure, respiratory therapy assess nares and determine need for appliance change or resting period.  Outcome: Progressing  Note: Skin CDI       Problem: Safety - Adult  Goal: Free from fall injury  Outcome: Progressing  Flowsheets (Taken 10/12/2024 1026)  Free From Fall Injury: Instruct family/caregiver on patient safety     Problem: ABCDS Injury Assessment  Goal: Absence of physical injury  10/12/2024 1026 by Hellen Perrin, RN  Outcome: Progressing  10/12/2024 0340 by Luke Lyle, RN  Outcome: Progressing  Flowsheets (Taken 10/12/2024 0340)  Absence of Physical Injury: Implement safety measures based on patient assessment     
Encourage patient to monitor pain and request assistance   Assess pain using appropriate pain scale  10/13/2024 1740 by Shara Navarrete RN  Outcome: Progressing  Flowsheets (Taken 10/13/2024 1740)  Verbalizes/displays adequate comfort level or baseline comfort level:   Encourage patient to monitor pain and request assistance   Assess pain using appropriate pain scale     Problem: Skin/Tissue Integrity  Goal: Absence of new skin breakdown  Description: 1.  Monitor for areas of redness and/or skin breakdown  2.  Assess vascular access sites hourly  3.  Every 4-6 hours minimum:  Change oxygen saturation probe site  4.  Every 4-6 hours:  If on nasal continuous positive airway pressure, respiratory therapy assess nares and determine need for appliance change or resting period.  10/13/2024 2226 by Vahe Medrano RN  Outcome: Progressing  10/13/2024 1740 by Shara Navarrete RN  Outcome: Progressing  Note: No new signs or symptoms of skin breakdown noted this shift, encouraging patient to turn and reposition self in bed q2h       Problem: Safety - Adult  Goal: Free from fall injury  10/13/2024 2226 by Vahe Medrano RN  Outcome: Progressing  Flowsheets (Taken 10/12/2024 1026 by Hellen Perrin, RN)  Free From Fall Injury: Instruct family/caregiver on patient safety  10/13/2024 1740 by Shara Navarrete RN  Outcome: Progressing  Note: No falls noted this shift. Continue falling star program. Bed alarm on, bed in low position. Call light and personal belongings in reach.  Patient uses call light appropriately.       Problem: ABCDS Injury Assessment  Goal: Absence of physical injury  10/13/2024 2226 by Vahe Medrano RN  Outcome: Progressing  Flowsheets (Taken 10/12/2024 0340 by Luke Lyle, RN)  Absence of Physical Injury: Implement safety measures based on patient assessment  10/13/2024 1740 by Shara Navarrete RN  Outcome: Progressing     Problem: Musculoskeletal - Adult  Goal: Return mobility to safest 
Skin/Tissue Integrity - Adult  Goal: Skin integrity remains intact  Outcome: Progressing  Flowsheets  Taken 10/14/2024 2142 by Carol Ann Darling RN  Skin Integrity Remains Intact:   Monitor for areas of redness and/or skin breakdown   Assess vascular access sites hourly    Care plan reviewed with patient.  Patient verbalizes understanding of the care plan and contributed to goal setting.

## 2024-10-22 ENCOUNTER — TELEPHONE (OUTPATIENT)
Dept: CARDIOLOGY CLINIC | Age: 69
End: 2024-10-22

## 2024-11-04 ENCOUNTER — APPOINTMENT (OUTPATIENT)
Dept: CT IMAGING | Age: 69
End: 2024-11-04
Payer: MEDICARE

## 2024-11-04 ENCOUNTER — HOSPITAL ENCOUNTER (EMERGENCY)
Age: 69
Discharge: NURSING, REHAB OR LONG-TERM CARE | End: 2024-11-04
Attending: STUDENT IN AN ORGANIZED HEALTH CARE EDUCATION/TRAINING PROGRAM
Payer: MEDICARE

## 2024-11-04 VITALS
OXYGEN SATURATION: 96 % | DIASTOLIC BLOOD PRESSURE: 63 MMHG | HEIGHT: 64 IN | RESPIRATION RATE: 18 BRPM | WEIGHT: 178 LBS | HEART RATE: 78 BPM | SYSTOLIC BLOOD PRESSURE: 145 MMHG | BODY MASS INDEX: 30.39 KG/M2 | TEMPERATURE: 97.4 F

## 2024-11-04 DIAGNOSIS — G44.229 CHRONIC TENSION-TYPE HEADACHE, NOT INTRACTABLE: ICD-10-CM

## 2024-11-04 DIAGNOSIS — R10.84 GENERALIZED ABDOMINAL PAIN: Primary | ICD-10-CM

## 2024-11-04 LAB
ALBUMIN SERPL BCG-MCNC: 3.6 G/DL (ref 3.5–5.1)
ALP SERPL-CCNC: 72 U/L (ref 38–126)
ALT SERPL W/O P-5'-P-CCNC: 34 U/L (ref 11–66)
ANION GAP SERPL CALC-SCNC: 14 MEQ/L (ref 8–16)
AST SERPL-CCNC: 30 U/L (ref 5–40)
BASOPHILS ABSOLUTE: 0 THOU/MM3 (ref 0–0.1)
BASOPHILS NFR BLD AUTO: 0.3 %
BILIRUB SERPL-MCNC: 0.7 MG/DL (ref 0.3–1.2)
BUN SERPL-MCNC: 13 MG/DL (ref 7–22)
CALCIUM SERPL-MCNC: 10.1 MG/DL (ref 8.5–10.5)
CHLORIDE SERPL-SCNC: 95 MEQ/L (ref 98–111)
CO2 SERPL-SCNC: 21 MEQ/L (ref 23–33)
CREAT SERPL-MCNC: 0.7 MG/DL (ref 0.4–1.2)
DEPRECATED RDW RBC AUTO: 46.6 FL (ref 35–45)
EOSINOPHIL NFR BLD AUTO: 0.2 %
EOSINOPHILS ABSOLUTE: 0 THOU/MM3 (ref 0–0.4)
ERYTHROCYTE [DISTWIDTH] IN BLOOD BY AUTOMATED COUNT: 15.4 % (ref 11.5–14.5)
GFR SERPL CREATININE-BSD FRML MDRD: > 90 ML/MIN/1.73M2
GLUCOSE SERPL-MCNC: 174 MG/DL (ref 70–108)
HCT VFR BLD AUTO: 35.3 % (ref 37–47)
HGB BLD-MCNC: 11.4 GM/DL (ref 12–16)
IMM GRANULOCYTES # BLD AUTO: 0.04 THOU/MM3 (ref 0–0.07)
IMM GRANULOCYTES NFR BLD AUTO: 0.6 %
LIPASE SERPL-CCNC: 25.3 U/L (ref 5.6–51.3)
LYMPHOCYTES ABSOLUTE: 0.8 THOU/MM3 (ref 1–4.8)
LYMPHOCYTES NFR BLD AUTO: 11.6 %
MCH RBC QN AUTO: 27.2 PG (ref 26–33)
MCHC RBC AUTO-ENTMCNC: 32.3 GM/DL (ref 32.2–35.5)
MCV RBC AUTO: 84.2 FL (ref 81–99)
MONOCYTES ABSOLUTE: 0.4 THOU/MM3 (ref 0.4–1.3)
MONOCYTES NFR BLD AUTO: 6 %
NEUTROPHILS ABSOLUTE: 5.4 THOU/MM3 (ref 1.8–7.7)
NEUTROPHILS NFR BLD AUTO: 81.3 %
NRBC BLD AUTO-RTO: 0 /100 WBC
OSMOLALITY SERPL CALC.SUM OF ELEC: 265.1 MOSMOL/KG (ref 275–300)
PLATELET # BLD AUTO: 245 THOU/MM3 (ref 130–400)
PMV BLD AUTO: 9.6 FL (ref 9.4–12.4)
POTASSIUM SERPL-SCNC: 3.9 MEQ/L (ref 3.5–5.2)
PROCALCITONIN SERPL IA-MCNC: 0.4 NG/ML (ref 0.01–0.09)
PROT SERPL-MCNC: 7.2 G/DL (ref 6.1–8)
RBC # BLD AUTO: 4.19 MILL/MM3 (ref 4.2–5.4)
SODIUM SERPL-SCNC: 130 MEQ/L (ref 135–145)
WBC # BLD AUTO: 6.7 THOU/MM3 (ref 4.8–10.8)

## 2024-11-04 PROCEDURE — 84145 PROCALCITONIN (PCT): CPT

## 2024-11-04 PROCEDURE — 2580000003 HC RX 258: Performed by: STUDENT IN AN ORGANIZED HEALTH CARE EDUCATION/TRAINING PROGRAM

## 2024-11-04 PROCEDURE — 74177 CT ABD & PELVIS W/CONTRAST: CPT

## 2024-11-04 PROCEDURE — 80053 COMPREHEN METABOLIC PANEL: CPT

## 2024-11-04 PROCEDURE — 99285 EMERGENCY DEPT VISIT HI MDM: CPT

## 2024-11-04 PROCEDURE — 96374 THER/PROPH/DIAG INJ IV PUSH: CPT

## 2024-11-04 PROCEDURE — 85025 COMPLETE CBC W/AUTO DIFF WBC: CPT

## 2024-11-04 PROCEDURE — 96375 TX/PRO/DX INJ NEW DRUG ADDON: CPT

## 2024-11-04 PROCEDURE — 6360000002 HC RX W HCPCS

## 2024-11-04 PROCEDURE — 36415 COLL VENOUS BLD VENIPUNCTURE: CPT

## 2024-11-04 PROCEDURE — 83690 ASSAY OF LIPASE: CPT

## 2024-11-04 PROCEDURE — 6360000004 HC RX CONTRAST MEDICATION: Performed by: STUDENT IN AN ORGANIZED HEALTH CARE EDUCATION/TRAINING PROGRAM

## 2024-11-04 PROCEDURE — 6360000002 HC RX W HCPCS: Performed by: STUDENT IN AN ORGANIZED HEALTH CARE EDUCATION/TRAINING PROGRAM

## 2024-11-04 RX ORDER — KETOROLAC TROMETHAMINE 30 MG/ML
15 INJECTION, SOLUTION INTRAMUSCULAR; INTRAVENOUS ONCE
Status: COMPLETED | OUTPATIENT
Start: 2024-11-04 | End: 2024-11-04

## 2024-11-04 RX ORDER — 0.9 % SODIUM CHLORIDE 0.9 %
1000 INTRAVENOUS SOLUTION INTRAVENOUS ONCE
Status: COMPLETED | OUTPATIENT
Start: 2024-11-04 | End: 2024-11-04

## 2024-11-04 RX ORDER — ONDANSETRON 2 MG/ML
4 INJECTION INTRAMUSCULAR; INTRAVENOUS ONCE
Status: DISCONTINUED | OUTPATIENT
Start: 2024-11-04 | End: 2024-11-04

## 2024-11-04 RX ORDER — IOPAMIDOL 755 MG/ML
80 INJECTION, SOLUTION INTRAVASCULAR
Status: COMPLETED | OUTPATIENT
Start: 2024-11-04 | End: 2024-11-04

## 2024-11-04 RX ORDER — METOCLOPRAMIDE HYDROCHLORIDE 5 MG/ML
10 INJECTION INTRAMUSCULAR; INTRAVENOUS ONCE
Status: COMPLETED | OUTPATIENT
Start: 2024-11-04 | End: 2024-11-04

## 2024-11-04 RX ADMIN — IOPAMIDOL 80 ML: 755 INJECTION, SOLUTION INTRAVENOUS at 15:45

## 2024-11-04 RX ADMIN — METOCLOPRAMIDE 10 MG: 5 INJECTION, SOLUTION INTRAMUSCULAR; INTRAVENOUS at 15:33

## 2024-11-04 RX ADMIN — KETOROLAC TROMETHAMINE 15 MG: 30 INJECTION, SOLUTION INTRAMUSCULAR at 15:33

## 2024-11-04 RX ADMIN — SODIUM CHLORIDE 1000 ML: 9 INJECTION, SOLUTION INTRAVENOUS at 15:33

## 2024-11-04 ASSESSMENT — PAIN SCALES - GENERAL
PAINLEVEL_OUTOF10: 7
PAINLEVEL_OUTOF10: 8
PAINLEVEL_OUTOF10: 10
PAINLEVEL_OUTOF10: 7
PAINLEVEL_OUTOF10: 7
PAINLEVEL_OUTOF10: 10
PAINLEVEL_OUTOF10: 7

## 2024-11-04 ASSESSMENT — PAIN - FUNCTIONAL ASSESSMENT
PAIN_FUNCTIONAL_ASSESSMENT: 0-10
PAIN_FUNCTIONAL_ASSESSMENT: ADULT NONVERBAL PAIN SCALE (NPVS)

## 2024-11-04 ASSESSMENT — PAIN DESCRIPTION - PAIN TYPE: TYPE: ACUTE PAIN

## 2024-11-04 ASSESSMENT — PAIN DESCRIPTION - LOCATION
LOCATION: ABDOMEN
LOCATION: HEAD

## 2024-11-04 ASSESSMENT — PAIN DESCRIPTION - ORIENTATION: ORIENTATION: RIGHT

## 2024-11-04 ASSESSMENT — PAIN DESCRIPTION - DESCRIPTORS: DESCRIPTORS: ACHING

## 2024-11-04 NOTE — ED NOTES
Pt. Resting in bed with even and unlabored respirations. Pt. States her pain is a 7/10. Pt. Updated about plan for discharge. Pt. Has no further concerns, questions or needs at this time. Call light within reach.

## 2024-11-04 NOTE — ED PROVIDER NOTES
User Index  [TY] Bonifacio Yuan MD       PROCEDURES: (None if blank)  Procedures:     CRITICAL CARE:  Refer to attending note    MEDICATION CHANGES     New Prescriptions    No medications on file       FINAL DISPOSITION   Shared Decision-Making was performed, disposition discussed with the patient/family and questions answered.    Outpatient follow up (If applicable):  Ryan Montalvo MD  [address] [facility] [address]  [facility] [address]    Go to   As needed    [facility] EMERGENCY DEPT  [address] [place], [place] [id number] [phone]  Go to   If symptoms worsen    The results of pertinent diagnostic studies and exam findings were discussed with the patient/surrogate. The patient’s provisional diagnosis and plan of care were discussed with the patient and present family who expressed understanding. Medications were reviewed and indications and risks of medications were discussed with the patient/family present. Strict return precautions and follow up instructions were discussed with the patient prior to discharge, with which the patient agrees.          FINAL DIAGNOSES:  Final diagnoses:   Generalized abdominal pain   Chronic tension-type headache, not intractable       Condition: condition: stable  Dispo: Discharge to nursing home  DISPOSITION Decision To Discharge 11/04/2024 04:20:13 PM           This transcription was electronically signed. It was dictated by use of voice recognition software and electronically transcribed. The transcription may contain errors not detected in proofreading.

## 2024-11-04 NOTE — ED NOTES
Pt. Resting in bed with even and unlabored respirations. Pt. States pain is a 10/10 at this time. Medicated per mar. Pt. Updated about plan of care and treatment.  Pt. Has no further concerns, questions or needs at this time. Call light within reach.

## 2024-11-04 NOTE — ED NOTES
Pt. Resting in bed with even and unlabored respirations. Pt. Resting on left side with eyes closed. Pt. Has no further concerns, questions or needs at this time. Call light within reach.

## 2024-11-04 NOTE — PLAN OF CARE
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  Recently s/p right BKA in August and left BKA on 24 and has been at [facility] since surgery in September.  Increased lethargy and confusion 10/24, seen in the [facility] ER with concern for pneumonia and UTI s/p IV fluids and ceftriaxone, transferred to [facility] for sepsis, started on broad spectrum ABX, IV fluids, and stress dose steroids.  The patient was transferred to [facility] on 10/26 for further work up of sepsis and encephalopathy, possible UTI as source.  Also with supratherapeutic tacrolimus level.  Medically ready for discharge back to TCU.       Recommendations:   - Consider serial imaging for the lung nodules per transplant ID.  Of note, pt. is due for repeat PET for interval follow up PTLD of duodenum, recommend follow-up with oncology on discharge.  - Diuresis per primary  - Discontinue Duoneb and transition back to PTA albuterol inhaler prn upon discharge  - Continue 2L NC overnight for now and repeat overnight oximetry 24-48h prior to discharge from TCU  - DSA (10/27) pending  - Pulmonary follow up currently scheduled   - Tacrolimus level ordered  if remains inpatient (vs obtain at TCU then weekly on  at minimum)  - Chronic azithromycin held while on ciprofloxacin, resume once course complete  - CMV weekly monitoring x4 given recent    Problem: Pain  Goal: Verbalizes/displays adequate comfort level or baseline comfort level  1/12/2024 0933 by Victoriano Riojas RN  Outcome: Progressing     Problem: Safety - Adult  Goal: Free from fall injury  1/12/2024 0933 by Victoriano Riojas RN  Outcome: Progressing     Problem: ABCDS Injury Assessment  Goal: Absence of physical injury  1/12/2024 0933 by Victoriano Riojas RN  Outcome: Progressing     Problem: Skin/Tissue Integrity  Goal: Absence of new skin breakdown  Description: 1.  Monitor for areas of redness and/or skin breakdown  2.  Assess vascular access sites hourly  3.  Every 4-6 hours minimum:  Change oxygen saturation probe site  4.  Every 4-6 hours:  If on nasal continuous positive airway pressure, respiratory therapy assess nares and determine need for appliance change or resting period.  1/12/2024 0933 by Victoriano Riojas RN  Outcome: Progressing     Problem: Chronic Conditions and Co-morbidities  Goal: Patient's chronic conditions and co-morbidity symptoms are monitored and maintained or improved  1/12/2024 0933 by Victoriano Riojas RN  Outcome: Progressing     Problem: Respiratory - Adult  Goal: Clear lung sounds  1/12/2024 0933 by Victoriano Riojas RN  Outcome: Progressing     Problem: Infection - Adult  Goal: Absence of infection during hospitalization  1/12/2024 0933 by Victoriano Riojas RN  Outcome: Progressing     Problem: Discharge Planning  Goal: Discharge to home or other facility with appropriate resources  1/12/2024 0933 by Victoriano Riojas RN  Outcome: Progressing      stress dose steroids (next due 11/8)  - PO ciprofloxacin through 11/7 for total 14d course for UTI per transplant ID, QTc monitoring per primary     Acute hypoxic respiratory failure, Resolved:  Nocturnal hypoxia:  Concern for RUL/RML pneumonia:  Encephalopathy, Resolved: Admitted from OSH with sepsis and encephalopathy, likely UTI as source with possible pneumonia.  Also with supratherapeutic tacrolimus level possibly contributing to encephalopathy, now improved.  CT CAP (10/25) noted interval development of new hazy ill-defined pulmonary nodule involving RUL and RML, interval decrease in prior consolidation posterior RLL with minimal residual atelectasis remaining, minimal atelectasis LLL with stable chronic left basilar pleural fluid, no new lymphadenopathy, unchanged pancreatic cyst, and heterogeneous enhancement to a moderately enlarged prostate.  Tmax 102.8.  CRP, procal, and LA elevated, all now improving.  S/p stress dose steroids (stopped 10/27).  MRSA swab, covid/influenza/RSV, Legionella/Strep pneumoniae (10/25) all negative, respiratory panel (10/26) negative.  Fungitell, A. galactomannan, Fungal Ab, and Histo blood Ag (10/25) all negative.  Histo galactomannan Ag urine (10/26) negative.  Suspect hypoxia in part due to hypervolemia, CXR (10/27) with increased size of cardiac silhouette and increased pulmonary edema.  Repeat CXR (10/28) with decreased pulmonary edema with mild residual edematous and/or atelectatic changes and increased small left pleural effusion.  Echo (10/28) with EF 38%, normal RV, no significant valvular abnormalities.  Cardiology consulted 10/30 given echo findings and cardiac history, see their note for details.  On RA during the day, 1-2L NC overnight (no oxygen use at baseline), did not use oxygen overnight 11/3-11/4.  Overnight oximetry study 11/1-11/2 with SpO2 <88% for 7 minutes and 52 seconds.  - Consider serial imaging for the lung nodules per transplant ID.  Of note, pt. is  due for repeat PET for interval follow up PTLD of duodenum, recommend follow-up with oncology on discharge.  - ABX as below per transplant ID  - Diuresis per primary  - Nebs: Duoneb QID --> discontinue nebs and transition back to PTA albuterol inhaler prn upon discharge  - IS and Aerobika q1-2h while awake   - Continue 2L NC overnight for now and repeat overnight oximetry 24-48h prior to discharge from TCU     S/p bilateral sequential lung transplant (BSLT) for A1AT: DSA negative 11/17/23.  Last seen by Dr. Murphy in May and at that time PFTs had improved to recent best (in two years).  EBV (10/27) negative.    - DSA (10/27) pending  - Pulmonary follow up currently scheduled 12/19     Immunosuppression: 2 drug IS due to history of PTLD  - Tacrolimus 2 mg BID (increased 11/3).  Goal level 8-10.  Repeat level ordered 11/6 if remains inpatient (vs obtain at TCU then weekly on Mondays at minimum).  - Prednisone 5 mg qAM / 2.5 mg qPM     Prophylaxis:   - Dapsone for PJP ppx  - S/p acyclovir for shingles prophylaxis s/p rituximab per chart (started on 4/23, stopped 10/27)     CLAD: PTA azithromycin 250 mg three times/week (for QTc of 460) --> held while on ciprofloxacin, resume once course complete  - Continue Breo (substitute for Advair), and montelukast.     H/o recurrent CMV viremia: Per OP notes, pt. had been on chronic valcyte, but this was stopped secondary to cost.  CMV level (11/1) negative.    - CMV weekly monitoring x4 given recent stress dose steroids (next due 11/8)     Hypogammaglobulinemia: IgG low although adequate at 552 on 10/27, no indication for IVIG at this time.     Other relevant problems being managed by the primary team:     Probable urosepsis:  Prostatic enhancement see on CT: Admitted with sepsis and encephalopathy as above.  CT CAP findings as above.  Urine culture (10/24) with E. coli and Klebsiella pneumoniae.  Urine culture (10/25) negative.  Transplant ID consulted, see their note for details.    - ABX: PO ciprofloxacin (10/29-11/7) for total 14d course per transplant ID, (signed off 10/29), QTc monitoring per primary, s/p additional ceftriaxone (10/27-10/28), minocycline (10/26-10/28, h/o Steno), Zosyn (10/25-10/26), doxycycline (10/25-10/26), and IV vancomycin (10/25-10/27)     Pancytopenia: Possibly secondary to infection, improvement noted on CBC since 10/28.    Cardiac: H/O PCI with MEGHNA.  Cardiomegaly on admission CXR.  Echo (10/28) with decreased left ventricular function, EF 35-40% (moderately reduced), grade I or early diastolic dysfunction, moderate diffuse hypokinesis is present.  Coronary angiogram (11/1) without significant progression of CAD and no evidence of obstructive epicardial disease, only mild ISR.  Cardiology consulted 10/30, see their note for details, leading suspicion is likely induced by septic cardiomyopathy.  - Management per primary  - Cardiology follow up with repeat echo as OP    We appreciate the excellent care provided by the [facility] team.  Recommendations communicated via [employer] and this note.  Will continue to follow along closely, please do not hesitate to call with any questions or concerns.    Patient discussed with Dr. Joya.    Delma Henry PA-C  Inpatient [facility]  Pulmonary CF/Transplant     Subjective & Interval History:     Remains on RA, did not use oxygen overnight last night.  No dyspnea or sputum.  Morning cough stable.  Stools remain loose/somewhat formed, using Imodium.  Left BKA pain managed with Tylenol.  Remains net negative with diuresis.    Review of Systems:     C: No fever, no chills, + decreased weight  INTEGUMENTARY/SKIN: No rash or obvious new lesions  ENT/MOUTH: No sore throat, no sinus pain, no nasal congestion or drainage  RESP: See interval history  CV: No chest pain, no peripheral edema  GI: No nausea, no vomiting, no reflux symptoms  : No dysuria  MUSCULOSKELETAL: See interval history  ENDOCRINE: Blood sugars with adequate  "control  NEURO: No headache  PSYCHIATRIC: Mood stable    Physical Exam:     All notes, images, and labs from past 24 hours (at minimum) were reviewed.    Vital signs:  Temp: 97.9  F (36.6  C) Temp src: Oral BP: (!) 148/94 Pulse: 99   Resp: 20 SpO2: 96 % O2 Device: None (Room air) Oxygen Delivery: 1 LPM Height: 138.4 cm (4' 6.5\") Weight: 57 kg (125 lb 10.6 oz)  I/O:   Intake/Output Summary (Last 24 hours) at 11/4/2024 1153  Last data filed at 11/4/2024 1101  Gross per 24 hour   Intake 1300 ml   Output 1125 ml   Net 175 ml     Constitutional: Lying upright in bed, in no apparent distress.   HEENT: Eyes with pink conjunctivae, anicteric.  Oral mucosa moist without lesions.   PULM: Mildly diminished air flowt to LLL.  No crackles, no rhonchi, no wheezes.  Non-labored breathing on RA.  CV: Normal S1 and S2.  RRR.  No murmur, gallop, or rub.  No peripheral edema.   ABD: NABS, soft, nondistended.    MSK: Moves all extremities.    NEURO: Alert and conversant.   SKIN: Warm, dry.  PSYCH: Mood stable.     Data:     LABS    CMP:   Recent Labs   Lab 11/04/24  0913 11/04/24  0326 11/03/24  2134 11/03/24  1801 11/02/24  0841 11/02/24  0527 11/01/24  1912 11/01/24  1341 11/01/24  1028 11/01/24  0552 10/31/24  2240 10/31/24  1744 10/31/24  0425 10/31/24  0416   NA  --   --   --   --   --  137  --  136  --  138  --  137  --  141   POTASSIUM  --   --   --   --   --  4.8  --  3.9  --  4.4  --  4.5  --  3.7   CHLORIDE  --   --   --   --   --  102  --  103  --  104  --  101  --  105   CO2  --   --   --   --   --  26  --  25  --  25  --  26  --  26   ANIONGAP  --   --   --   --   --  9  --  8  --  9  --  10  --  10   * 115* 113* 132*   < > 137*   < > 114*   < > 115*   < > 150*   < > 90   BUN  --   --   --   --   --  24.9*  --  26.2*  --  28.1*  --  30.9*  --  32.6*   CR  --   --   --   --   --  0.74  --  0.64*  --  0.73  --  0.81  --  0.97   GFRESTIMATED  --   --   --   --   --  >90  --  >90  --  >90  --  >90  --  83   ERIN  --   -- " "  --   --   --  8.2*  --  8.2*  --  8.1*  --  8.1*  --  8.0*   MAG  --   --   --   --   --  1.7  --   --   --  1.8  --  2.2  --  1.5*    < > = values in this interval not displayed.     CBC:   Recent Labs   Lab 11/02/24  0527 11/01/24  1341 11/01/24  0552 10/31/24  0416   WBC 7.9 9.4 8.4 9.0   RBC 3.03* 3.09* 3.14* 2.93*   HGB 9.2* 9.6* 9.7* 9.0*   HCT 30.2* 31.0* 31.2* 29.1*    100 99 99   MCH 30.4 31.1 30.9 30.7   MCHC 30.5* 31.0* 31.1* 30.9*   RDW 17.0* 17.0* 16.8* 16.4*    238 228 186       INR:   Recent Labs   Lab 11/01/24  1341   INR 1.26*       Glucose:   Recent Labs   Lab 11/04/24  0913 11/04/24  0326 11/03/24  2134 11/03/24  1801 11/03/24  1428 11/03/24  0941   * 115* 113* 132* 159* 117*       Blood Gas: No lab results found in last 7 days.    Culture Data No results for input(s): \"CULT\" in the last 168 hours.    Virology Data:   Lab Results   Component Value Date    INFLUA Negative 01/28/2014    FLUAH1 Not Detected 10/26/2024    FLUAH3 Not Detected 10/26/2024    DP1113 Not Detected 10/26/2024    IFLUB Not Detected 10/26/2024    RSVA Not Detected 10/26/2024    RSVB Not Detected 10/26/2024    PIV1 Not Detected 10/26/2024    PIV2 Not Detected 10/26/2024    PIV3 Not Detected 10/26/2024    HMPV Not Detected 10/26/2024    HRVS Negative 02/24/2019    ADVBE Negative 02/24/2019    ADVC Negative 02/24/2019    ADVC Negative 08/05/2014    ADVC Negative 06/03/2014       Historical CMV results (last 3 of prior testing):  Lab Results   Component Value Date    CMVQNT Not Detected 11/01/2024    CMVQNT Not Detected 10/25/2024    CMVQNT Not Detected 07/12/2024     Lab Results   Component Value Date    CMVLOG Not Calculated 06/03/2021    CMVLOG Not Calculated 05/12/2021    CMVLOG Not Calculated 02/19/2020       Urine Studies    Recent Labs   Lab Test 10/25/24  1250 08/29/24  1522   URINEPH 5.0 5.0   NITRITE Negative Negative   LEUKEST Trace* Negative   WBCU 12* 1       Most Recent [facility] Pulmonary " Function Testing (FVC/FEV1 only)  FVC-Pre   Date Value Ref Range Status   05/16/2024 3.69 L    11/17/2023 3.67 L    05/25/2023 3.74 L    11/17/2022 3.68 L      FVC-%Pred-Pre   Date Value Ref Range Status   05/16/2024 101 %    11/17/2023 100 %    05/25/2023 93 %    11/17/2022 91 %      FEV1-Pre   Date Value Ref Range Status   05/16/2024 3.03 L    11/17/2023 2.93 L    05/25/2023 2.94 L    11/17/2022 2.80 L      FEV1-%Pred-Pre   Date Value Ref Range Status   05/16/2024 108 %    11/17/2023 104 %    05/25/2023 96 %    11/17/2022 91 %        IMAGING    No results found for this or any previous visit (from the past 48 hours).

## 2024-11-04 NOTE — ED NOTES
ADULT NUTRITION INITIAL ASSESSMENT    Pt is at moderate nutrition risk. Pt meets moderate malnutrition criteria.       CRITERIA FOR MALNUTRITION DIAGNOSIS:  Criteria for non-severe malnutrition diagnosis: chronic illness related to wt loss 5% in 1 month, e Pt. Resting in bed with even and unlabored respirations. Pt. States pain is a 7/10 at this time. Provided lunch box. Pt. Updated about plan for discharge around 2200.  Pt. Has no further concerns, questions or needs at this time. Call light within reach.      communicated to RN  - Discharge and transfer of nutrition care to new setting or provider: monitor plans    ADMITTING DIAGNOSIS:   Acute on chronic diastolic heart failure (HCC) [I50.33]  Pulmonary hypertension (HCC) [I27.20]  Chronic obstructive pulmonary for advanced age  IBW: 130 lbs        93% IBW  Usual Body Wt: 130 lbs       93% UBW    WEIGHT HISTORY: Current wt reflects 7% (9 lb) wt loss over the past week.  Some wt loss likely r/t fluctuations in fluid volumes   Patient Weight(s) for the past 336 hrs: Once per day on Mon Wed Fri   • dilTIAZem HCl ER Beads  120 mg Oral Daily   • hydrALAzine HCl  10 mg Oral TID   • Levothyroxine Sodium  150 mcg Oral Before breakfast   • Montelukast Sodium  10 mg Oral Nightly   • atorvastatin  20 mg Oral Nightly   • minhusa breakdown, promote healing, monitor fluid status and maintain true wt within 5%    DIETITIAN TO FOLLOW.      [address] [facility] Jun 87, Nick Whiting 66

## 2024-11-05 NOTE — ED NOTES
Pt resting in bed. Vitals assessed. RR easy and unlabored. Pt rates headache 7/10 at this time. Report from Saumya PUTNAM. Assumed care at this time.

## 2025-01-15 ENCOUNTER — APPOINTMENT (OUTPATIENT)
Dept: CT IMAGING | Age: 70
DRG: 863 | End: 2025-01-15
Payer: MEDICARE

## 2025-01-15 ENCOUNTER — HOSPITAL ENCOUNTER (INPATIENT)
Age: 70
LOS: 4 days | Discharge: HOME | DRG: 863 | End: 2025-01-20
Attending: EMERGENCY MEDICINE | Admitting: FAMILY MEDICINE
Payer: MEDICARE

## 2025-01-15 DIAGNOSIS — R10.30 LOWER ABDOMINAL PAIN: ICD-10-CM

## 2025-01-15 DIAGNOSIS — D72.829 LEUKOCYTOSIS, UNSPECIFIED TYPE: ICD-10-CM

## 2025-01-15 DIAGNOSIS — R10.32 LEFT LOWER QUADRANT ABDOMINAL PAIN: ICD-10-CM

## 2025-01-15 DIAGNOSIS — N28.89 PERINEPHRIC FLUID COLLECTION: Primary | ICD-10-CM

## 2025-01-15 LAB
ALBUMIN SERPL BCP-MCNC: 2.8 GM/DL (ref 3.4–5)
ALP SERPL-CCNC: 74 U/L (ref 46–116)
ALT SERPL W P-5'-P-CCNC: 28 U/L (ref 14–63)
AMORPH SED URNS QL MICRO: ABNORMAL
ANION GAP SERPL CALC-SCNC: 11 MEQ/L (ref 8–16)
AST SERPL W P-5'-P-CCNC: 17 U/L (ref 15–37)
BACTERIA URNS QL MICRO: ABNORMAL
BASOPHILS # BLD: 0.2 % (ref 0–3)
BASOPHILS ABSOLUTE: 0 THOU/MM3 (ref 0–0.1)
BILIRUB SERPL-MCNC: 0.4 MG/DL (ref 0.2–1)
BILIRUB UR QL STRIP.AUTO: NEGATIVE
BUN SERPL-MCNC: 19 MG/DL (ref 7–18)
CALCIUM SERPL-MCNC: 10.4 MG/DL (ref 8.5–10.1)
CASTS #/AREA URNS LPF: ABNORMAL /LPF
CHARACTER UR: CLEAR
CHLORIDE SERPL-SCNC: 93 MEQ/L (ref 98–107)
CO2 SERPL-SCNC: 25 MEQ/L (ref 21–32)
COLOR UR AUTO: ABNORMAL
CREAT SERPL-MCNC: 1 MG/DL (ref 0.6–1.3)
CRYSTALS VITF MICRO: ABNORMAL
EOSINOPHILS ABSOLUTE: 0.1 THOU/MM3 (ref 0–0.5)
EOSINOPHILS RELATIVE PERCENT: 0.8 % (ref 0–4)
EPI CELLS #/AREA URNS HPF: ABNORMAL /HPF
GFR SERPL CREATININE-BSD FRML MDRD: 62 ML/MIN/1.73M2
GLUCOSE SERPL-MCNC: 169 MG/DL (ref 74–106)
GLUCOSE UR QL STRIP.AUTO: NEGATIVE MG/DL
HCT VFR BLD CALC: 32.8 % (ref 37–47)
HEMOGLOBIN: 10.4 GM/DL (ref 12–16)
HGB UR STRIP.AUTO-MCNC: ABNORMAL MG/L
IMMATURE GRANS (ABS): 0.13 THOU/MM3 (ref 0–0.07)
IMMATURE GRANULOCYTES %: 1 %
KETONES UR QL STRIP.AUTO: NEGATIVE
LACTATE: 1.1 MMOL/L (ref 0.9–1.7)
LEUKOCYTE ESTERASE UR QL STRIP.AUTO: ABNORMAL
LIPASE SERPL-CCNC: 32 U/L (ref 16–77)
LYMPHOCYTES # BLD AUTO: 18.1 % (ref 15–47)
LYMPHOCYTES ABSOLUTE: 2.3 THOU/MM3 (ref 1–4.8)
MCH RBC QN AUTO: 25.6 PG (ref 26–32)
MCHC RBC AUTO-ENTMCNC: 31.7 GM/DL (ref 31–35)
MCV RBC AUTO: 80.8 FL (ref 81–99)
MONOCYTES: 1 THOU/MM3 (ref 0.3–1.3)
MONOCYTES: 8.3 % (ref 0–12)
MUCOUS THREADS URNS QL MICRO: ABNORMAL
NEUTROPHILS ABSOLUTE: 9.1 THOU/MM3 (ref 1.8–7.7)
NITRITE UR QL STRIP.AUTO: NEGATIVE
PDW BLD-RTO: 14.3 % (ref 11.5–14.9)
PH UR STRIP.AUTO: 6 [PH] (ref 5–9)
PLATELET # BLD AUTO: 331 THOU/MM3 (ref 130–400)
PMV BLD AUTO: 8.1 FL (ref 9.4–12.4)
POTASSIUM SERPL-SCNC: 4.7 MEQ/L (ref 3.5–5.1)
PROT SERPL-MCNC: 7.7 GM/DL (ref 6.4–8.2)
PROT UR STRIP.AUTO-MCNC: NEGATIVE MG/DL
RBC # BLD: 4.06 MILL/MM3 (ref 4.1–5.3)
RBC #/AREA URNS HPF: ABNORMAL /HPF
REFLEX TO URINE C & S: ABNORMAL
SEG NEUTROPHILS: 72 % (ref 43–75)
SODIUM SERPL-SCNC: 129 MEQ/L (ref 136–145)
SP GR UR STRIP.AUTO: < 1.005 (ref 1–1.03)
UROBILINOGEN UR STRIP-ACNC: 0.2 EU/DL (ref 0–1)
WBC # BLD: 12.6 THOU/MM3 (ref 4.8–10.8)
WBC # UR STRIP.AUTO: ABNORMAL /HPF

## 2025-01-15 PROCEDURE — 87086 URINE CULTURE/COLONY COUNT: CPT

## 2025-01-15 PROCEDURE — 6360000004 HC RX CONTRAST MEDICATION: Performed by: EMERGENCY MEDICINE

## 2025-01-15 PROCEDURE — 96375 TX/PRO/DX INJ NEW DRUG ADDON: CPT

## 2025-01-15 PROCEDURE — 81001 URINALYSIS AUTO W/SCOPE: CPT

## 2025-01-15 PROCEDURE — 83605 ASSAY OF LACTIC ACID: CPT

## 2025-01-15 PROCEDURE — 99285 EMERGENCY DEPT VISIT HI MDM: CPT

## 2025-01-15 PROCEDURE — 96376 TX/PRO/DX INJ SAME DRUG ADON: CPT

## 2025-01-15 PROCEDURE — 80053 COMPREHEN METABOLIC PANEL: CPT

## 2025-01-15 PROCEDURE — 96365 THER/PROPH/DIAG IV INF INIT: CPT

## 2025-01-15 PROCEDURE — 83690 ASSAY OF LIPASE: CPT

## 2025-01-15 PROCEDURE — 87040 BLOOD CULTURE FOR BACTERIA: CPT

## 2025-01-15 PROCEDURE — 6360000002 HC RX W HCPCS: Performed by: EMERGENCY MEDICINE

## 2025-01-15 PROCEDURE — 85025 COMPLETE CBC W/AUTO DIFF WBC: CPT

## 2025-01-15 PROCEDURE — 74177 CT ABD & PELVIS W/CONTRAST: CPT

## 2025-01-15 RX ORDER — IOPAMIDOL 755 MG/ML
100 INJECTION, SOLUTION INTRAVASCULAR
Status: COMPLETED | OUTPATIENT
Start: 2025-01-15 | End: 2025-01-15

## 2025-01-15 RX ORDER — MORPHINE SULFATE 2 MG/ML
2 INJECTION, SOLUTION INTRAMUSCULAR; INTRAVENOUS ONCE
Status: COMPLETED | OUTPATIENT
Start: 2025-01-15 | End: 2025-01-15

## 2025-01-15 RX ORDER — ONDANSETRON 2 MG/ML
4 INJECTION INTRAMUSCULAR; INTRAVENOUS ONCE
Status: COMPLETED | OUTPATIENT
Start: 2025-01-15 | End: 2025-01-15

## 2025-01-15 RX ORDER — MORPHINE SULFATE 2 MG/ML
2 INJECTION, SOLUTION INTRAMUSCULAR; INTRAVENOUS ONCE
Status: COMPLETED | OUTPATIENT
Start: 2025-01-16 | End: 2025-01-15

## 2025-01-15 RX ORDER — CIPROFLOXACIN 2 MG/ML
400 INJECTION, SOLUTION INTRAVENOUS ONCE
Status: COMPLETED | OUTPATIENT
Start: 2025-01-15 | End: 2025-01-15

## 2025-01-15 RX ADMIN — MORPHINE SULFATE 2 MG: 2 INJECTION, SOLUTION INTRAMUSCULAR; INTRAVENOUS at 21:49

## 2025-01-15 RX ADMIN — CIPROFLOXACIN 400 MG: 400 INJECTION, SOLUTION INTRAVENOUS at 21:54

## 2025-01-15 RX ADMIN — MORPHINE SULFATE 2 MG: 2 INJECTION, SOLUTION INTRAMUSCULAR; INTRAVENOUS at 23:41

## 2025-01-15 RX ADMIN — IOPAMIDOL 100 ML: 755 INJECTION, SOLUTION INTRAVENOUS at 20:52

## 2025-01-15 RX ADMIN — ONDANSETRON 4 MG: 2 INJECTION INTRAMUSCULAR; INTRAVENOUS at 21:48

## 2025-01-15 ASSESSMENT — PAIN SCALES - GENERAL
PAINLEVEL_OUTOF10: 6
PAINLEVEL_OUTOF10: 5
PAINLEVEL_OUTOF10: 10
PAINLEVEL_OUTOF10: 7
PAINLEVEL_OUTOF10: 10
PAINLEVEL_OUTOF10: 7
PAINLEVEL_OUTOF10: 10

## 2025-01-15 ASSESSMENT — PAIN DESCRIPTION - DESCRIPTORS
DESCRIPTORS: ACHING;SHARP
DESCRIPTORS: PRESSURE;DISCOMFORT
DESCRIPTORS: DISCOMFORT;PRESSURE
DESCRIPTORS: DISCOMFORT

## 2025-01-15 ASSESSMENT — PAIN DESCRIPTION - LOCATION
LOCATION: ABDOMEN

## 2025-01-15 ASSESSMENT — PAIN - FUNCTIONAL ASSESSMENT
PAIN_FUNCTIONAL_ASSESSMENT: WONG-BAKER FACES
PAIN_FUNCTIONAL_ASSESSMENT: 0-10
PAIN_FUNCTIONAL_ASSESSMENT: 0-10

## 2025-01-15 ASSESSMENT — PAIN SCALES - WONG BAKER: WONGBAKER_NUMERICALRESPONSE: NO HURT

## 2025-01-15 ASSESSMENT — PAIN DESCRIPTION - ORIENTATION
ORIENTATION: LEFT;LOWER
ORIENTATION: LEFT;LOWER

## 2025-01-16 ENCOUNTER — APPOINTMENT (OUTPATIENT)
Dept: CT IMAGING | Age: 70
DRG: 863 | End: 2025-01-16
Payer: MEDICARE

## 2025-01-16 PROBLEM — D72.829 LEUKOCYTOSIS: Status: ACTIVE | Noted: 2025-01-16

## 2025-01-16 PROBLEM — G93.40 ACUTE ENCEPHALOPATHY: Status: RESOLVED | Noted: 2024-01-10 | Resolved: 2025-01-16

## 2025-01-16 PROBLEM — K59.00 CONSTIPATION: Chronic | Status: ACTIVE | Noted: 2024-10-13

## 2025-01-16 PROBLEM — I50.32 CHRONIC HEART FAILURE WITH PRESERVED EJECTION FRACTION (HFPEF) (HCC): Chronic | Status: ACTIVE | Noted: 2018-02-18

## 2025-01-16 PROBLEM — Z86.73 HISTORY OF ARTERIAL ISCHEMIC STROKE: Status: RESOLVED | Noted: 2019-07-20 | Resolved: 2025-01-16

## 2025-01-16 PROBLEM — F17.200 TOBACCO USE DISORDER: Status: RESOLVED | Noted: 2018-02-18 | Resolved: 2025-01-16

## 2025-01-16 PROBLEM — R07.9 LEFT-SIDED CHEST PAIN: Status: RESOLVED | Noted: 2024-09-11 | Resolved: 2025-01-16

## 2025-01-16 PROBLEM — R41.3 AMNESIA: Chronic | Status: ACTIVE | Noted: 2019-12-11

## 2025-01-16 PROBLEM — R10.30 LOWER ABDOMINAL PAIN: Status: ACTIVE | Noted: 2025-01-16

## 2025-01-16 PROBLEM — N30.01 ACUTE CYSTITIS WITH HEMATURIA: Status: RESOLVED | Noted: 2024-01-19 | Resolved: 2025-01-16

## 2025-01-16 PROBLEM — N28.89 PERINEPHRIC FLUID COLLECTION: Status: ACTIVE | Noted: 2023-10-29

## 2025-01-16 PROBLEM — E03.9 ACQUIRED HYPOTHYROIDISM: Status: ACTIVE | Noted: 2024-10-16

## 2025-01-16 PROBLEM — E83.42 HYPOMAGNESEMIA: Status: RESOLVED | Noted: 2024-10-13 | Resolved: 2025-01-16

## 2025-01-16 PROBLEM — T83.022A NEPHROSTOMY TUBE DISPLACED (HCC): Status: RESOLVED | Noted: 2024-10-11 | Resolved: 2025-01-16

## 2025-01-16 PROBLEM — N15.1 RENAL ABSCESS: Status: ACTIVE | Noted: 2024-11-14

## 2025-01-16 PROBLEM — F19.959 SUBSTANCE-INDUCED PSYCHOTIC DISORDER (HCC): Status: RESOLVED | Noted: 2018-10-26 | Resolved: 2025-01-16

## 2025-01-16 PROBLEM — M54.50 CHRONIC MIDLINE LOW BACK PAIN WITHOUT SCIATICA: Chronic | Status: ACTIVE | Noted: 2019-07-20

## 2025-01-16 PROBLEM — R10.9 ABDOMINAL PAIN: Status: ACTIVE | Noted: 2025-01-16

## 2025-01-16 PROBLEM — G89.29 CHRONIC MIDLINE LOW BACK PAIN WITHOUT SCIATICA: Chronic | Status: ACTIVE | Noted: 2019-07-20

## 2025-01-16 PROBLEM — S37.012A RENAL HEMATOMA, LEFT: Status: RESOLVED | Noted: 2023-01-15 | Resolved: 2025-01-16

## 2025-01-16 PROBLEM — N80.9 ENDOMETRIOSIS: Status: RESOLVED | Noted: 2019-12-11 | Resolved: 2025-01-16

## 2025-01-16 PROBLEM — I20.1 CORONARY VASOSPASM (HCC): Chronic | Status: ACTIVE | Noted: 2019-08-17

## 2025-01-16 PROBLEM — R82.71 BACTERIURIA: Status: RESOLVED | Noted: 2024-10-13 | Resolved: 2025-01-16

## 2025-01-16 PROBLEM — M22.40: Chronic | Status: ACTIVE | Noted: 2019-12-11

## 2025-01-16 PROBLEM — F17.200 TOBACCO USE DISORDER: Chronic | Status: ACTIVE | Noted: 2018-02-18

## 2025-01-16 PROBLEM — R29.898 RIGHT ARM WEAKNESS: Status: RESOLVED | Noted: 2019-05-06 | Resolved: 2025-01-16

## 2025-01-16 PROBLEM — N39.0 URINARY TRACT INFECTION IN FEMALE: Status: RESOLVED | Noted: 2024-01-10 | Resolved: 2025-01-16

## 2025-01-16 PROBLEM — E03.9 ACQUIRED HYPOTHYROIDISM: Chronic | Status: ACTIVE | Noted: 2024-10-16

## 2025-01-16 LAB
GLUCOSE BLD STRIP.AUTO-MCNC: 167 MG/DL (ref 70–108)
GLUCOSE BLD STRIP.AUTO-MCNC: 186 MG/DL (ref 70–108)
GLUCOSE BLD STRIP.AUTO-MCNC: 236 MG/DL (ref 70–108)

## 2025-01-16 PROCEDURE — 87075 CULTR BACTERIA EXCEPT BLOOD: CPT

## 2025-01-16 PROCEDURE — 0W9H30Z DRAINAGE OF RETROPERITONEUM WITH DRAINAGE DEVICE, PERCUTANEOUS APPROACH: ICD-10-PCS | Performed by: RADIOLOGY

## 2025-01-16 PROCEDURE — 1200000000 HC SEMI PRIVATE

## 2025-01-16 PROCEDURE — 6370000000 HC RX 637 (ALT 250 FOR IP)

## 2025-01-16 PROCEDURE — 6360000002 HC RX W HCPCS

## 2025-01-16 PROCEDURE — 6360000002 HC RX W HCPCS: Performed by: EMERGENCY MEDICINE

## 2025-01-16 PROCEDURE — 6370000000 HC RX 637 (ALT 250 FOR IP): Performed by: EMERGENCY MEDICINE

## 2025-01-16 PROCEDURE — 87077 CULTURE AEROBIC IDENTIFY: CPT

## 2025-01-16 PROCEDURE — 1200000003 HC TELEMETRY R&B

## 2025-01-16 PROCEDURE — 82948 REAGENT STRIP/BLOOD GLUCOSE: CPT

## 2025-01-16 PROCEDURE — 6360000002 HC RX W HCPCS: Performed by: RADIOLOGY

## 2025-01-16 PROCEDURE — 2709999900 CT ABSCESS DRAINAGE W CATH PLACEMENT S&I

## 2025-01-16 PROCEDURE — 77012 CT SCAN FOR NEEDLE BIOPSY: CPT

## 2025-01-16 PROCEDURE — 2500000003 HC RX 250 WO HCPCS

## 2025-01-16 PROCEDURE — 87186 SC STD MICRODIL/AGAR DIL: CPT

## 2025-01-16 PROCEDURE — 87070 CULTURE OTHR SPECIMN AEROBIC: CPT

## 2025-01-16 PROCEDURE — 99223 1ST HOSP IP/OBS HIGH 75: CPT

## 2025-01-16 PROCEDURE — 87205 SMEAR GRAM STAIN: CPT

## 2025-01-16 PROCEDURE — 99221 1ST HOSP IP/OBS SF/LOW 40: CPT | Performed by: UROLOGY

## 2025-01-16 RX ORDER — CARVEDILOL 3.12 MG/1
3.12 TABLET ORAL 2 TIMES DAILY
Status: DISCONTINUED | OUTPATIENT
Start: 2025-01-16 | End: 2025-01-20 | Stop reason: HOSPADM

## 2025-01-16 RX ORDER — FOLIC ACID 1 MG/1
1 TABLET ORAL DAILY
Status: DISCONTINUED | OUTPATIENT
Start: 2025-01-16 | End: 2025-01-20 | Stop reason: HOSPADM

## 2025-01-16 RX ORDER — POLYETHYLENE GLYCOL 3350 17 G/17G
17 POWDER, FOR SOLUTION ORAL DAILY PRN
Status: DISCONTINUED | OUTPATIENT
Start: 2025-01-16 | End: 2025-01-20 | Stop reason: HOSPADM

## 2025-01-16 RX ORDER — FENOFIBRATE 160 MG/1
160 TABLET ORAL DAILY
Status: DISCONTINUED | OUTPATIENT
Start: 2025-01-16 | End: 2025-01-17

## 2025-01-16 RX ORDER — ONDANSETRON 2 MG/ML
4 INJECTION INTRAMUSCULAR; INTRAVENOUS ONCE
Status: COMPLETED | OUTPATIENT
Start: 2025-01-16 | End: 2025-01-16

## 2025-01-16 RX ORDER — ACETAMINOPHEN 325 MG/1
650 TABLET ORAL EVERY 6 HOURS PRN
Status: DISCONTINUED | OUTPATIENT
Start: 2025-01-16 | End: 2025-01-20 | Stop reason: HOSPADM

## 2025-01-16 RX ORDER — SODIUM CHLORIDE 0.9 % (FLUSH) 0.9 %
5-40 SYRINGE (ML) INJECTION PRN
Status: DISCONTINUED | OUTPATIENT
Start: 2025-01-16 | End: 2025-01-20 | Stop reason: HOSPADM

## 2025-01-16 RX ORDER — POTASSIUM CHLORIDE 1500 MG/1
40 TABLET, EXTENDED RELEASE ORAL PRN
Status: DISCONTINUED | OUTPATIENT
Start: 2025-01-16 | End: 2025-01-20 | Stop reason: HOSPADM

## 2025-01-16 RX ORDER — QUETIAPINE FUMARATE 100 MG/1
200 TABLET, FILM COATED ORAL NIGHTLY
Status: DISCONTINUED | OUTPATIENT
Start: 2025-01-16 | End: 2025-01-20 | Stop reason: HOSPADM

## 2025-01-16 RX ORDER — MORPHINE SULFATE 2 MG/ML
2 INJECTION, SOLUTION INTRAMUSCULAR; INTRAVENOUS ONCE
Status: COMPLETED | OUTPATIENT
Start: 2025-01-16 | End: 2025-01-16

## 2025-01-16 RX ORDER — ENOXAPARIN SODIUM 100 MG/ML
40 INJECTION SUBCUTANEOUS DAILY
Status: DISCONTINUED | OUTPATIENT
Start: 2025-01-16 | End: 2025-01-20 | Stop reason: HOSPADM

## 2025-01-16 RX ORDER — INSULIN GLARGINE 100 [IU]/ML
14 INJECTION, SOLUTION SUBCUTANEOUS NIGHTLY
Status: DISCONTINUED | OUTPATIENT
Start: 2025-01-16 | End: 2025-01-20 | Stop reason: HOSPADM

## 2025-01-16 RX ORDER — LEVOTHYROXINE SODIUM 150 UG/1
150 TABLET ORAL
Status: DISCONTINUED | OUTPATIENT
Start: 2025-01-19 | End: 2025-01-20 | Stop reason: HOSPADM

## 2025-01-16 RX ORDER — HYDROCODONE BITARTRATE AND ACETAMINOPHEN 5; 325 MG/1; MG/1
2 TABLET ORAL EVERY 4 HOURS PRN
Status: DISCONTINUED | OUTPATIENT
Start: 2025-01-16 | End: 2025-01-17

## 2025-01-16 RX ORDER — ONDANSETRON 4 MG/1
4 TABLET, ORALLY DISINTEGRATING ORAL EVERY 8 HOURS PRN
Status: DISCONTINUED | OUTPATIENT
Start: 2025-01-16 | End: 2025-01-20 | Stop reason: HOSPADM

## 2025-01-16 RX ORDER — LORAZEPAM 1 MG/1
1 TABLET ORAL ONCE
Status: COMPLETED | OUTPATIENT
Start: 2025-01-16 | End: 2025-01-16

## 2025-01-16 RX ORDER — MAGNESIUM SULFATE IN WATER 40 MG/ML
2000 INJECTION, SOLUTION INTRAVENOUS PRN
Status: DISCONTINUED | OUTPATIENT
Start: 2025-01-16 | End: 2025-01-20 | Stop reason: HOSPADM

## 2025-01-16 RX ORDER — SODIUM CHLORIDE 9 MG/ML
INJECTION, SOLUTION INTRAVENOUS PRN
Status: DISCONTINUED | OUTPATIENT
Start: 2025-01-16 | End: 2025-01-20 | Stop reason: HOSPADM

## 2025-01-16 RX ORDER — BUPROPION HYDROCHLORIDE 300 MG/1
300 TABLET ORAL EVERY MORNING
Status: DISCONTINUED | OUTPATIENT
Start: 2025-01-16 | End: 2025-01-20 | Stop reason: HOSPADM

## 2025-01-16 RX ORDER — ONDANSETRON 2 MG/ML
4 INJECTION INTRAMUSCULAR; INTRAVENOUS EVERY 6 HOURS PRN
Status: DISCONTINUED | OUTPATIENT
Start: 2025-01-16 | End: 2025-01-20 | Stop reason: HOSPADM

## 2025-01-16 RX ORDER — INSULIN LISPRO 100 [IU]/ML
0-8 INJECTION, SOLUTION INTRAVENOUS; SUBCUTANEOUS
Status: DISCONTINUED | OUTPATIENT
Start: 2025-01-16 | End: 2025-01-20 | Stop reason: HOSPADM

## 2025-01-16 RX ORDER — LEVOTHYROXINE SODIUM 75 UG/1
75 TABLET ORAL
Status: DISCONTINUED | OUTPATIENT
Start: 2025-01-16 | End: 2025-01-20 | Stop reason: HOSPADM

## 2025-01-16 RX ORDER — MIDAZOLAM HYDROCHLORIDE 1 MG/ML
INJECTION, SOLUTION INTRAMUSCULAR; INTRAVENOUS PRN
Status: COMPLETED | OUTPATIENT
Start: 2025-01-16 | End: 2025-01-16

## 2025-01-16 RX ORDER — ATORVASTATIN CALCIUM 40 MG/1
40 TABLET, FILM COATED ORAL NIGHTLY
Status: DISCONTINUED | OUTPATIENT
Start: 2025-01-16 | End: 2025-01-20 | Stop reason: HOSPADM

## 2025-01-16 RX ORDER — ACETAMINOPHEN 650 MG/1
650 SUPPOSITORY RECTAL EVERY 6 HOURS PRN
Status: DISCONTINUED | OUTPATIENT
Start: 2025-01-16 | End: 2025-01-20 | Stop reason: HOSPADM

## 2025-01-16 RX ORDER — ROPINIROLE 0.5 MG/1
0.5 TABLET, FILM COATED ORAL NIGHTLY
COMMUNITY
Start: 2024-12-30

## 2025-01-16 RX ORDER — ROPINIROLE 0.5 MG/1
0.5 TABLET, FILM COATED ORAL NIGHTLY
Status: DISCONTINUED | OUTPATIENT
Start: 2025-01-16 | End: 2025-01-20 | Stop reason: HOSPADM

## 2025-01-16 RX ORDER — HYDROCODONE BITARTRATE AND ACETAMINOPHEN 5; 325 MG/1; MG/1
1 TABLET ORAL EVERY 4 HOURS PRN
Status: DISCONTINUED | OUTPATIENT
Start: 2025-01-16 | End: 2025-01-17

## 2025-01-16 RX ORDER — LINACLOTIDE 145 UG/1
145 CAPSULE, GELATIN COATED ORAL
COMMUNITY
Start: 2025-01-10

## 2025-01-16 RX ORDER — SODIUM CHLORIDE 0.9 % (FLUSH) 0.9 %
5-40 SYRINGE (ML) INJECTION EVERY 12 HOURS SCHEDULED
Status: DISCONTINUED | OUTPATIENT
Start: 2025-01-16 | End: 2025-01-20 | Stop reason: HOSPADM

## 2025-01-16 RX ORDER — TRAZODONE HYDROCHLORIDE 100 MG/1
200 TABLET ORAL NIGHTLY
Status: DISCONTINUED | OUTPATIENT
Start: 2025-01-16 | End: 2025-01-20 | Stop reason: HOSPADM

## 2025-01-16 RX ORDER — HYDROCODONE BITARTRATE AND ACETAMINOPHEN 5; 325 MG/1; MG/1
1 TABLET ORAL EVERY 6 HOURS PRN
Status: ON HOLD | COMMUNITY
Start: 2025-01-12 | End: 2025-01-18

## 2025-01-16 RX ORDER — CIPROFLOXACIN 2 MG/ML
400 INJECTION, SOLUTION INTRAVENOUS EVERY 12 HOURS
Status: DISCONTINUED | OUTPATIENT
Start: 2025-01-16 | End: 2025-01-18

## 2025-01-16 RX ORDER — PIOGLITAZONE 15 MG/1
15 TABLET ORAL DAILY
Status: DISCONTINUED | OUTPATIENT
Start: 2025-01-16 | End: 2025-01-20 | Stop reason: HOSPADM

## 2025-01-16 RX ORDER — ESCITALOPRAM OXALATE 20 MG/1
20 TABLET ORAL DAILY
Status: DISCONTINUED | OUTPATIENT
Start: 2025-01-16 | End: 2025-01-20 | Stop reason: HOSPADM

## 2025-01-16 RX ORDER — FENTANYL CITRATE 50 UG/ML
INJECTION, SOLUTION INTRAMUSCULAR; INTRAVENOUS PRN
Status: COMPLETED | OUTPATIENT
Start: 2025-01-16 | End: 2025-01-16

## 2025-01-16 RX ORDER — LEVOTHYROXINE SODIUM 75 UG/1
75 TABLET ORAL DAILY
Status: DISCONTINUED | OUTPATIENT
Start: 2025-01-16 | End: 2025-01-16 | Stop reason: SDUPTHER

## 2025-01-16 RX ORDER — ALBUTEROL SULFATE 0.83 MG/ML
2.5 SOLUTION RESPIRATORY (INHALATION) EVERY 6 HOURS PRN
Status: DISCONTINUED | OUTPATIENT
Start: 2025-01-16 | End: 2025-01-20 | Stop reason: HOSPADM

## 2025-01-16 RX ORDER — POTASSIUM CHLORIDE 7.45 MG/ML
10 INJECTION INTRAVENOUS PRN
Status: DISCONTINUED | OUTPATIENT
Start: 2025-01-16 | End: 2025-01-20 | Stop reason: HOSPADM

## 2025-01-16 RX ORDER — FERROUS SULFATE 325(65) MG
325 TABLET ORAL
Status: DISCONTINUED | OUTPATIENT
Start: 2025-01-18 | End: 2025-01-20 | Stop reason: HOSPADM

## 2025-01-16 RX ORDER — PANTOPRAZOLE SODIUM 40 MG/1
40 TABLET, DELAYED RELEASE ORAL
Status: DISCONTINUED | OUTPATIENT
Start: 2025-01-16 | End: 2025-01-20 | Stop reason: HOSPADM

## 2025-01-16 RX ADMIN — ENOXAPARIN SODIUM 40 MG: 100 INJECTION SUBCUTANEOUS at 10:17

## 2025-01-16 RX ADMIN — INSULIN LISPRO 2 UNITS: 100 INJECTION, SOLUTION INTRAVENOUS; SUBCUTANEOUS at 17:09

## 2025-01-16 RX ADMIN — FENOFIBRATE 160 MG: 160 TABLET ORAL at 11:13

## 2025-01-16 RX ADMIN — INSULIN LISPRO 2 UNITS: 100 INJECTION, SOLUTION INTRAVENOUS; SUBCUTANEOUS at 11:12

## 2025-01-16 RX ADMIN — PANTOPRAZOLE SODIUM 40 MG: 40 TABLET, DELAYED RELEASE ORAL at 15:36

## 2025-01-16 RX ADMIN — BUPROPION HYDROCHLORIDE 300 MG: 300 TABLET, EXTENDED RELEASE ORAL at 10:17

## 2025-01-16 RX ADMIN — CIPROFLOXACIN 400 MG: 400 INJECTION, SOLUTION INTRAVENOUS at 21:15

## 2025-01-16 RX ADMIN — INSULIN LISPRO 2 UNITS: 100 INJECTION, SOLUTION INTRAVENOUS; SUBCUTANEOUS at 20:45

## 2025-01-16 RX ADMIN — SODIUM CHLORIDE, PRESERVATIVE FREE 10 ML: 5 INJECTION INTRAVENOUS at 10:17

## 2025-01-16 RX ADMIN — HYDROCODONE BITARTRATE AND ACETAMINOPHEN 2 TABLET: 5; 325 TABLET ORAL at 11:12

## 2025-01-16 RX ADMIN — FOLIC ACID 1 MG: 1 TABLET ORAL at 11:13

## 2025-01-16 RX ADMIN — QUETIAPINE FUMARATE 200 MG: 100 TABLET ORAL at 20:46

## 2025-01-16 RX ADMIN — MIDAZOLAM 0.5 MG: 1 INJECTION INTRAMUSCULAR; INTRAVENOUS at 14:13

## 2025-01-16 RX ADMIN — SODIUM CHLORIDE, PRESERVATIVE FREE 10 ML: 5 INJECTION INTRAVENOUS at 20:48

## 2025-01-16 RX ADMIN — HYDROCODONE BITARTRATE AND ACETAMINOPHEN 2 TABLET: 5; 325 TABLET ORAL at 17:06

## 2025-01-16 RX ADMIN — ROPINIROLE HYDROCHLORIDE 0.5 MG: 0.5 TABLET, FILM COATED ORAL at 20:46

## 2025-01-16 RX ADMIN — CARVEDILOL 3.12 MG: 3.12 TABLET, FILM COATED ORAL at 10:18

## 2025-01-16 RX ADMIN — ATORVASTATIN CALCIUM 40 MG: 40 TABLET, FILM COATED ORAL at 20:46

## 2025-01-16 RX ADMIN — CARVEDILOL 3.12 MG: 3.12 TABLET, FILM COATED ORAL at 20:46

## 2025-01-16 RX ADMIN — TRAZODONE HYDROCHLORIDE 200 MG: 100 TABLET ORAL at 20:46

## 2025-01-16 RX ADMIN — Medication 6 MG: at 20:45

## 2025-01-16 RX ADMIN — LEVOTHYROXINE SODIUM 75 MCG: 0.07 TABLET ORAL at 11:13

## 2025-01-16 RX ADMIN — ONDANSETRON 4 MG: 2 INJECTION INTRAMUSCULAR; INTRAVENOUS at 00:54

## 2025-01-16 RX ADMIN — LORAZEPAM 1 MG: 1 TABLET ORAL at 00:54

## 2025-01-16 RX ADMIN — ESCITALOPRAM OXALATE 20 MG: 20 TABLET ORAL at 10:18

## 2025-01-16 RX ADMIN — MORPHINE SULFATE 2 MG: 2 INJECTION, SOLUTION INTRAMUSCULAR; INTRAVENOUS at 00:55

## 2025-01-16 RX ADMIN — FENTANYL CITRATE 50 MCG: 50 INJECTION, SOLUTION INTRAMUSCULAR; INTRAVENOUS at 14:13

## 2025-01-16 RX ADMIN — INSULIN GLARGINE 14 UNITS: 100 INJECTION, SOLUTION SUBCUTANEOUS at 20:45

## 2025-01-16 ASSESSMENT — PAIN DESCRIPTION - ORIENTATION
ORIENTATION: LEFT
ORIENTATION: LEFT
ORIENTATION: LOWER;LEFT
ORIENTATION: LEFT;LOWER
ORIENTATION: LEFT
ORIENTATION: LEFT
ORIENTATION: LEFT;LOWER
ORIENTATION: LEFT;LOWER

## 2025-01-16 ASSESSMENT — PAIN DESCRIPTION - ONSET
ONSET: ON-GOING

## 2025-01-16 ASSESSMENT — PAIN - FUNCTIONAL ASSESSMENT
PAIN_FUNCTIONAL_ASSESSMENT: ACTIVITIES ARE NOT PREVENTED

## 2025-01-16 ASSESSMENT — PAIN SCALES - GENERAL
PAINLEVEL_OUTOF10: 10
PAINLEVEL_OUTOF10: 7
PAINLEVEL_OUTOF10: 8
PAINLEVEL_OUTOF10: 8
PAINLEVEL_OUTOF10: 10
PAINLEVEL_OUTOF10: 0
PAINLEVEL_OUTOF10: 7
PAINLEVEL_OUTOF10: 7
PAINLEVEL_OUTOF10: 3

## 2025-01-16 ASSESSMENT — PAIN DESCRIPTION - LOCATION
LOCATION: ABDOMEN

## 2025-01-16 ASSESSMENT — PAIN DESCRIPTION - FREQUENCY
FREQUENCY: CONTINUOUS

## 2025-01-16 ASSESSMENT — PAIN DESCRIPTION - DESCRIPTORS
DESCRIPTORS: DISCOMFORT;PRESSURE
DESCRIPTORS: ACHING
DESCRIPTORS: ACHING
DESCRIPTORS: DISCOMFORT;PRESSURE
DESCRIPTORS: ACHING
DESCRIPTORS: DISCOMFORT

## 2025-01-16 ASSESSMENT — PAIN SCALES - WONG BAKER
WONGBAKER_NUMERICALRESPONSE: NO HURT
WONGBAKER_NUMERICALRESPONSE: NO HURT
WONGBAKER_NUMERICALRESPONSE: HURTS LITTLE MORE

## 2025-01-16 ASSESSMENT — PAIN DESCRIPTION - PAIN TYPE
TYPE: ACUTE PAIN

## 2025-01-16 NOTE — CARE COORDINATION
Case Management Assessment Initial Evaluation    Date/Time of Evaluation: 2025 7:53 AM  Assessment Completed by: Wilbert Maloney RN    If patient is discharged prior to next notation, then this note serves as note for discharge by case management.    Patient Name: Elli Coulter                   YOB: 1955  Diagnosis: Abdominal pain [R10.9]  Perinephric fluid collection [N28.89]  Leukocytosis, unspecified type [D72.829]  Left lower quadrant abdominal pain [R10.32]                   Date / Time: 1/15/2025  7:59 PM  Location: [facility]/017-     Patient Admission Status: Inpatient   Readmission Risk Low 0-14, Mod 15-19), High > 20: Readmission Risk Score: 22.5    Current PCP: Ryan Montalvo MD  Health Care Decision Makers:   Primary Decision Maker: ONE,NO - Child - [phone]    Secondary Decision Maker (Active): Bailey Trimble - Brother/Sister - [phone]    Additional Case Management Notes: Na+129, WBC 12.6. Pain control, prn nebs. IR drainage of fluid collection ordered. Urology consult pending. PT/OT.     Procedures: none    Imagin/15 CT abd: Stranding and ill-defined enhancement in the nephrectomy bed could be infectious/inflammatory. .Increase in size of rim enhancing fluid collection in the left   retroperitoneum    Patient Goals/Plan/Treatment Preferences: From the [facility]. SW consulted.

## 2025-01-16 NOTE — ED NOTES
Report called to Maria E PUTNAM. Troy MEDINA agreed to transport patient to [facility]

## 2025-01-16 NOTE — ED PROVIDER NOTES
[facility] Emergency Department  [address] [place] [address]  Phone: [phone]  EMERGENCY DEPARTMENT ENCOUNTER      Pt Name: Elli Coulter  MRN: [id number]  Birthdate 1955  Date of evaluation: 1/15/2025  Provider: Domingo Goodwin MD    CHIEF COMPLAINT       Chief Complaint   Patient presents with    Abdominal Pain         HISTORY OF PRESENT ILLNESS      Elli Coulter is a 69 y.o. female who presents to the emergency department with above noted complaint.  Patient has abdominal pain discomfort.  Been diagnosed with possible constipation in past.  She is coming from nursing facility.  She has had a left-sided nephrectomy several months ago.  She had some perinephric infection and abscess that were working on for several months prior to that.        REVIEW OF SYSTEMS     Positive for flank pain abdominal pain.  No vomiting no fever  Review of Systems  All systems negative except as marked.     PAST MEDICAL HISTORY     Past Medical History:   Diagnosis Date    Allergic rhinitis     Arthritis     back, arms, hips    Bipolar 1 disorder (HCC)     Cancer (HCC) 2011    cancerous polyps removed - Dr. Silva    Cataract     Colon polyps     COPD (chronic obstructive pulmonary disease) (HCC) 07/24/2014    Coronary vasospasm (HCC) 08/17/2019    Depression     Diverticulitis of colon     GERD (gastroesophageal reflux disease)     Glaucoma     Hearing loss     Hiatal hernia     History of arterial ischemic stroke 07/20/2019    History of colonoscopy 2002    History of kidney stones     Hx of blood clots 06/17/2014    PE and collar bone area after shoulder surgery    Hyperlipidemia     Hypertension     Liver disease     enlarged liver - damaged with alcohol in past but no cirrhosis per patient    Pneumonia 07/24/2014    Seasonal allergies     sneezing    Sexual problems     Suicidal thoughts     2015 admitted to  from [facility]    Thyroid disease     Urinary incontinence     Vomiting

## 2025-01-16 NOTE — H&P
every 6 hours as needed for Pain 2 tab   albuterol (PROVENTIL) (2.5 MG/3ML) 0.083% nebulizer solution Past Month Self No Yes   Sig: Take 3 mLs by nebulization every 6 hours as needed for Wheezing   atorvastatin (LIPITOR) 40 MG tablet Past Week Self Yes Yes   Sig: Take 1 tablet by mouth nightly   buPROPion (WELLBUTRIN XL) 150 MG extended release tablet 1/15/2025 at 0908  Yes Yes   Sig: Take 2 tablets by mouth every morning   carvedilol (COREG) 3.125 MG tablet 1/15/2025 at 0908 Self Yes Yes   Sig: TAKE 1 TABLET BY MOUTH TWICE DAILY   diclofenac sodium (VOLTAREN) 1 % GEL Past Month  No Yes   Sig: Apply 4 g topically 2 times daily   docusate sodium (COLACE) 100 MG capsule Past Month  No Yes   Sig: Take 1 capsule by mouth 2 times daily as needed for Constipation   escitalopram (LEXAPRO) 20 MG tablet 1/15/2025 at 0908 Self Yes Yes   Sig: Take 1 tablet by mouth daily   fenofibrate 160 MG tablet 1/15/2025 at 0908  Yes Yes   Sig: Take 1 tablet by mouth daily   ferrous sulfate (IRON 325) 325 (65 Fe) MG tablet 1/15/2025 at 0908  No Yes   Sig: Take 1 tablet by mouth every 3 days With breakfast   fluticasone (FLONASE) 50 MCG/ACT nasal spray Past Month Self No Yes   Si spray by Each Nostril route 2 times daily   folic acid (FOLVITE) 1 MG tablet 1/15/2025 at 0908 Self Yes Yes   Sig: Take 1 tablet by mouth daily   insulin glargine (LANTUS) 100 UNIT/ML injection vial 1/15/2025 at 1908  Yes Yes   Sig: Inject 14 Units into the skin nightly   insulin lispro (HUMALOG) 100 UNIT/ML injection cartridge 1/15/2025 at 1640  Yes Yes   Sig: Inject into the skin 3 times daily (before meals)   levothyroxine (SYNTHROID) 75 MCG tablet 1/15/2025 Self No Yes   Sig: Take 1 tablet by mouth daily everyday except  take 150 mcg.   lidocaine 4 % external patch Past Month  No Yes   Sig: Place 1 patch onto the skin daily   loperamide (IMODIUM) 2 MG capsule Unknown  Yes No   Sig: Take 1 capsule by mouth 5 (five) times a day Indications: Loose Stools

## 2025-01-16 NOTE — CONSULTS
[facility]  [facility]  [address] [address]  Dept: [phone]  Loc: [phone]  Visit Date: 1/15/2025    Urology Consult Note    Reason for Consult:  retroperitoneal fluid collection in setting of s/p nephrectomy   Requesting Physician:  medicine    History Obtained From:  patient, electronic medical record    Chief Complaint: abd pain    HISTORY OF PRESENT ILLNESS:                The patient is a 69 y.o. female with significant past medical history of s/p nephrectomy at  who presents with abd pain  Found to have reoccurring fluid collection  URO consulted for above      Past Medical History:        Diagnosis Date    Acute cystitis with hematuria 01/19/2024    Acute encephalopathy 01/10/2024    Alcohol dependence in remission ([facility])     Allergic rhinitis     Arthritis     back, arms, hips    Bacteriuria 10/13/2024    Bipolar 1 disorder ([facility])     Cancer ([facility]) 2011    cancerous polyps removed - Dr. Silva    Cannabis abuse     Cataract     Change of, skin texture 04/21/2014    Cocaine abuse ([facility])     Colon polyps     COPD (chronic obstructive pulmonary disease) (HCC) 07/24/2014    Coronary vasospasm ([facility]) 08/17/2019    Depression     Disorder associated with Helicobacter species 04/27/2012    Diverticulitis of colon     Endometriosis 12/11/2019    Erosive esophagitis 07/06/2015    Erosive gastritis 10/10/2015    GERD (gastroesophageal reflux disease)     Glaucoma     H pylori ulcer 10/10/2015    Hearing loss     Hiatal hernia     History of arterial ischemic stroke 07/20/2019    History of colonoscopy 2002    History of DVT (deep vein thrombosis)     History of Helicobacter pylori infection     History of kidney stones     History of pulmonary embolism 07/24/2014    Hx of blood clots 06/17/2014    PE and collar bone area after shoulder surgery    Hyperlipidemia     Hypertension     Hypomagnesemia 10/13/2024    Left-sided chest pain 09/11/2024

## 2025-01-17 LAB
ANION GAP SERPL CALC-SCNC: 17 MEQ/L (ref 8–16)
BACTERIA UR CULT: ABNORMAL
BASOPHILS ABSOLUTE: 0 THOU/MM3 (ref 0–0.1)
BASOPHILS NFR BLD AUTO: 0.4 %
BUN SERPL-MCNC: 22 MG/DL (ref 7–22)
CALCIUM SERPL-MCNC: 10.7 MG/DL (ref 8.5–10.5)
CHLORIDE SERPL-SCNC: 94 MEQ/L (ref 98–111)
CO2 SERPL-SCNC: 20 MEQ/L (ref 23–33)
CREAT SERPL-MCNC: 1.1 MG/DL (ref 0.4–1.2)
DEPRECATED RDW RBC AUTO: 43.5 FL (ref 35–45)
EOSINOPHIL NFR BLD AUTO: 1.6 %
EOSINOPHILS ABSOLUTE: 0.2 THOU/MM3 (ref 0–0.4)
ERYTHROCYTE [DISTWIDTH] IN BLOOD BY AUTOMATED COUNT: 14.6 % (ref 11.5–14.5)
GFR SERPL CREATININE-BSD FRML MDRD: 55 ML/MIN/1.73M2
GLUCOSE BLD STRIP.AUTO-MCNC: 168 MG/DL (ref 70–108)
GLUCOSE BLD STRIP.AUTO-MCNC: 173 MG/DL (ref 70–108)
GLUCOSE BLD STRIP.AUTO-MCNC: 206 MG/DL (ref 70–108)
GLUCOSE BLD STRIP.AUTO-MCNC: 206 MG/DL (ref 70–108)
GLUCOSE SERPL-MCNC: 185 MG/DL (ref 70–108)
HCT VFR BLD AUTO: 36 % (ref 37–47)
HGB BLD-MCNC: 11.2 GM/DL (ref 12–16)
IMM GRANULOCYTES # BLD AUTO: 0.15 THOU/MM3 (ref 0–0.07)
IMM GRANULOCYTES NFR BLD AUTO: 1.3 %
LYMPHOCYTES ABSOLUTE: 1.9 THOU/MM3 (ref 1–4.8)
LYMPHOCYTES NFR BLD AUTO: 16.4 %
MCH RBC QN AUTO: 25.5 PG (ref 26–33)
MCHC RBC AUTO-ENTMCNC: 31.1 GM/DL (ref 32.2–35.5)
MCV RBC AUTO: 81.8 FL (ref 81–99)
MONOCYTES ABSOLUTE: 1 THOU/MM3 (ref 0.4–1.3)
MONOCYTES NFR BLD AUTO: 8.5 %
NEUTROPHILS ABSOLUTE: 8.3 THOU/MM3 (ref 1.8–7.7)
NEUTROPHILS NFR BLD AUTO: 71.8 %
NRBC BLD AUTO-RTO: 0 /100 WBC
ORGANISM: ABNORMAL
PLATELET # BLD AUTO: 440 THOU/MM3 (ref 130–400)
PMV BLD AUTO: 8.7 FL (ref 9.4–12.4)
POTASSIUM SERPL-SCNC: 4.6 MEQ/L (ref 3.5–5.2)
RBC # BLD AUTO: 4.4 MILL/MM3 (ref 4.2–5.4)
SODIUM SERPL-SCNC: 131 MEQ/L (ref 135–145)
WBC # BLD AUTO: 11.5 THOU/MM3 (ref 4.8–10.8)

## 2025-01-17 PROCEDURE — 2500000003 HC RX 250 WO HCPCS

## 2025-01-17 PROCEDURE — 97162 PT EVAL MOD COMPLEX 30 MIN: CPT

## 2025-01-17 PROCEDURE — 1200000003 HC TELEMETRY R&B

## 2025-01-17 PROCEDURE — 85025 COMPLETE CBC W/AUTO DIFF WBC: CPT

## 2025-01-17 PROCEDURE — 99233 SBSQ HOSP IP/OBS HIGH 50: CPT | Performed by: NURSE PRACTITIONER

## 2025-01-17 PROCEDURE — 6360000002 HC RX W HCPCS

## 2025-01-17 PROCEDURE — 6370000000 HC RX 637 (ALT 250 FOR IP): Performed by: NURSE PRACTITIONER

## 2025-01-17 PROCEDURE — 1200000000 HC SEMI PRIVATE

## 2025-01-17 PROCEDURE — 82948 REAGENT STRIP/BLOOD GLUCOSE: CPT

## 2025-01-17 PROCEDURE — 99231 SBSQ HOSP IP/OBS SF/LOW 25: CPT | Performed by: UROLOGY

## 2025-01-17 PROCEDURE — 36415 COLL VENOUS BLD VENIPUNCTURE: CPT

## 2025-01-17 PROCEDURE — 80048 BASIC METABOLIC PNL TOTAL CA: CPT

## 2025-01-17 PROCEDURE — 6370000000 HC RX 637 (ALT 250 FOR IP)

## 2025-01-17 PROCEDURE — 97116 GAIT TRAINING THERAPY: CPT

## 2025-01-17 PROCEDURE — 2580000003 HC RX 258: Performed by: NURSE PRACTITIONER

## 2025-01-17 RX ORDER — FENOFIBRATE 54 MG/1
50 TABLET ORAL DAILY
Status: DISCONTINUED | OUTPATIENT
Start: 2025-01-18 | End: 2025-01-20 | Stop reason: HOSPADM

## 2025-01-17 RX ORDER — SODIUM CHLORIDE 9 MG/ML
INJECTION, SOLUTION INTRAVENOUS CONTINUOUS
Status: DISCONTINUED | OUTPATIENT
Start: 2025-01-17 | End: 2025-01-18

## 2025-01-17 RX ORDER — OXYCODONE AND ACETAMINOPHEN 5; 325 MG/1; MG/1
1 TABLET ORAL EVERY 4 HOURS PRN
Status: DISCONTINUED | OUTPATIENT
Start: 2025-01-17 | End: 2025-01-20 | Stop reason: HOSPADM

## 2025-01-17 RX ORDER — OXYCODONE AND ACETAMINOPHEN 5; 325 MG/1; MG/1
2 TABLET ORAL EVERY 4 HOURS PRN
Status: DISCONTINUED | OUTPATIENT
Start: 2025-01-17 | End: 2025-01-20 | Stop reason: HOSPADM

## 2025-01-17 RX ADMIN — PANTOPRAZOLE SODIUM 40 MG: 40 TABLET, DELAYED RELEASE ORAL at 08:52

## 2025-01-17 RX ADMIN — ATORVASTATIN CALCIUM 40 MG: 40 TABLET, FILM COATED ORAL at 20:20

## 2025-01-17 RX ADMIN — PANTOPRAZOLE SODIUM 40 MG: 40 TABLET, DELAYED RELEASE ORAL at 14:21

## 2025-01-17 RX ADMIN — CIPROFLOXACIN 400 MG: 400 INJECTION, SOLUTION INTRAVENOUS at 22:46

## 2025-01-17 RX ADMIN — BUPROPION HYDROCHLORIDE 300 MG: 300 TABLET, EXTENDED RELEASE ORAL at 08:53

## 2025-01-17 RX ADMIN — OXYCODONE HYDROCHLORIDE AND ACETAMINOPHEN 2 TABLET: 5; 325 TABLET ORAL at 14:20

## 2025-01-17 RX ADMIN — ENOXAPARIN SODIUM 40 MG: 100 INJECTION SUBCUTANEOUS at 08:52

## 2025-01-17 RX ADMIN — QUETIAPINE FUMARATE 200 MG: 100 TABLET ORAL at 20:20

## 2025-01-17 RX ADMIN — INSULIN GLARGINE 14 UNITS: 100 INJECTION, SOLUTION SUBCUTANEOUS at 20:25

## 2025-01-17 RX ADMIN — Medication 6 MG: at 20:25

## 2025-01-17 RX ADMIN — SODIUM CHLORIDE: 9 INJECTION, SOLUTION INTRAVENOUS at 14:25

## 2025-01-17 RX ADMIN — CARVEDILOL 3.12 MG: 3.12 TABLET, FILM COATED ORAL at 08:53

## 2025-01-17 RX ADMIN — OXYCODONE HYDROCHLORIDE AND ACETAMINOPHEN 2 TABLET: 5; 325 TABLET ORAL at 20:25

## 2025-01-17 RX ADMIN — INSULIN LISPRO 2 UNITS: 100 INJECTION, SOLUTION INTRAVENOUS; SUBCUTANEOUS at 14:21

## 2025-01-17 RX ADMIN — HYDROCODONE BITARTRATE AND ACETAMINOPHEN 2 TABLET: 5; 325 TABLET ORAL at 08:53

## 2025-01-17 RX ADMIN — TRAZODONE HYDROCHLORIDE 200 MG: 100 TABLET ORAL at 20:26

## 2025-01-17 RX ADMIN — ESCITALOPRAM OXALATE 20 MG: 20 TABLET ORAL at 08:53

## 2025-01-17 RX ADMIN — INSULIN LISPRO 2 UNITS: 100 INJECTION, SOLUTION INTRAVENOUS; SUBCUTANEOUS at 08:52

## 2025-01-17 RX ADMIN — HYDROCODONE BITARTRATE AND ACETAMINOPHEN 2 TABLET: 5; 325 TABLET ORAL at 03:34

## 2025-01-17 RX ADMIN — FENOFIBRATE 160 MG: 160 TABLET ORAL at 08:53

## 2025-01-17 RX ADMIN — CIPROFLOXACIN 400 MG: 400 INJECTION, SOLUTION INTRAVENOUS at 08:52

## 2025-01-17 RX ADMIN — SODIUM CHLORIDE, PRESERVATIVE FREE 10 ML: 5 INJECTION INTRAVENOUS at 08:49

## 2025-01-17 RX ADMIN — LEVOTHYROXINE SODIUM 75 MCG: 0.07 TABLET ORAL at 08:52

## 2025-01-17 RX ADMIN — FOLIC ACID 1 MG: 1 TABLET ORAL at 08:52

## 2025-01-17 RX ADMIN — CARVEDILOL 3.12 MG: 3.12 TABLET, FILM COATED ORAL at 20:21

## 2025-01-17 RX ADMIN — ROPINIROLE HYDROCHLORIDE 0.5 MG: 0.5 TABLET, FILM COATED ORAL at 20:27

## 2025-01-17 ASSESSMENT — PAIN SCALES - GENERAL
PAINLEVEL_OUTOF10: 0
PAINLEVEL_OUTOF10: 5
PAINLEVEL_OUTOF10: 7
PAINLEVEL_OUTOF10: 9
PAINLEVEL_OUTOF10: 9
PAINLEVEL_OUTOF10: 10
PAINLEVEL_OUTOF10: 10

## 2025-01-17 ASSESSMENT — PAIN DESCRIPTION - PAIN TYPE
TYPE: SURGICAL PAIN
TYPE: ACUTE PAIN
TYPE: SURGICAL PAIN

## 2025-01-17 ASSESSMENT — PAIN DESCRIPTION - FREQUENCY
FREQUENCY: INTERMITTENT

## 2025-01-17 ASSESSMENT — PAIN DESCRIPTION - LOCATION
LOCATION: ABDOMEN

## 2025-01-17 ASSESSMENT — PAIN DESCRIPTION - ONSET
ONSET: ON-GOING

## 2025-01-17 ASSESSMENT — PAIN - FUNCTIONAL ASSESSMENT
PAIN_FUNCTIONAL_ASSESSMENT: ACTIVITIES ARE NOT PREVENTED

## 2025-01-17 ASSESSMENT — PAIN DESCRIPTION - ORIENTATION
ORIENTATION: LEFT

## 2025-01-17 ASSESSMENT — PAIN DESCRIPTION - DESCRIPTORS
DESCRIPTORS: ACHING

## 2025-01-17 ASSESSMENT — PAIN SCALES - WONG BAKER
WONGBAKER_NUMERICALRESPONSE: NO HURT
WONGBAKER_NUMERICALRESPONSE: NO HURT

## 2025-01-17 NOTE — CARE COORDINATION
.1/17/25, 2:43 PM EST    DISCHARGE PLANNING EVALUATION     SW consult as Patient is from [facility]. Spoke with Patient and she is from [facility] and reports being there for the past year.  Patient plans to return and can ride in wheelchair at discharge.     ROBERTO spoke with Elliot at [facility], Patient is a  pre-cert with medicare but a medicaid bed hold. ROBERTO updated Elliot on need for miguelina-close drain and she will return with this in place possibly tomorrow. Elliot stated that Patient is able to return tomorrow and does not require a pre-cert.         Readmission Risk Low 0-14, Mod 15-19), High > 20: Readmission Risk Score: 23.2    Current PCP: Ryan Montalvo MD  PCP verified by CM? Yes    Patient Orientation: Alert and Oriented    Patient Cognition: Alert  History Provided by: Patient, Medical Record    Advance Directives:      Code Status: Full Code   Patient's Primary Decision Maker is: Named in Scanned ACP Document    Secondary Decision Maker (Active): Bailey Trimble - Brother/Sister - [phone]     Discharge Planning:    Patient lives with: Other (Comment) (SNF) Type of Home: Skilled Nursing Facility  Primary Care Giver: Other (Comment) (SNF)  Patient Support Systems include: Family Members   Current Financial resources: Medicare, Medicaid  Current community resources: ECF/Home Care  Current services prior to admission: Skilled Nursing Facility            Current DME:              Type of Home Care services:  None    ADLS  Prior functional level: Assistance with the following:, Cooking, Housework, Shopping, Mobility, Other (see comment) (from SNF)  Current functional level: Assistance with the following:, Mobility, Cooking, Housework, Shopping    Family can provide assistance at DC: No  Would you like Case Management to discuss the discharge plan with any other family members/significant others, and if so, who? No  Plans to Return to Present Housing: Yes  Other Identified Issues/Barriers to

## 2025-01-17 NOTE — CARE COORDINATION
01/17/25 1455   Service Assessment   Patient Orientation Alert and Oriented   Cognition Alert   History Provided By Patient;Medical Record   Primary Caregiver Other (Comment)  (SNF)   Support Systems Family Members   Patient's Healthcare Decision Maker is: Named in Scanned ACP Document   PCP Verified by CM Yes   Prior Functional Level Assistance with the following:;Cooking;Housework;Shopping;Mobility;Other (see comment)  (from SNF)   Current Functional Level Assistance with the following:;Mobility;Cooking;Housework;Shopping   Can patient return to prior living arrangement Yes   Ability to make needs known: Fair   Family able to assist with home care needs: No   Would you like for me to discuss the discharge plan with any other family members/significant others, and if so, who? No   Financial Resources Medicare;Medicaid   Community Resources ECF/Home Care   Discharge Planning   Type of Residence Skilled Nursing Facility   Living Arrangements Other (Comment)  (SNF)   Current Services Prior To Admission Skilled Nursing Facility   Potential Assistance Needed N/A   DME Ordered? No   Potential Assistance Purchasing Medications No   Type of Home Care Services None   Patient expects to be discharged to: Skilled nursing facility   Condition of Participation: Discharge Planning   The Plan for Transition of Care is related to the following treatment goals: improve infection

## 2025-01-17 NOTE — DISCHARGE INSTR - COC
Continuity of Care Form    Patient Name: Elli Coulter   :  1955  MRN:  [id number]    Admit date:  1/15/2025  Discharge date:  2025      Code Status Order: Full Code   Advance Directives:   Advance Care Flowsheet Documentation             Admitting Physician:  No admitting provider for patient encounter.  PCP: Ryan Montalvo MD    Discharging Nurse: Alejandra  Discharging Hospital Unit/Room#: 6K-17/017-A  Discharging Unit Phone Number: [phone]    Emergency Contact:   Extended Emergency Contact Information  Primary Emergency Contact: SREEKANTH BAEZ  Address: [address]  Mobile Phone: [phone]  Relation: Niece/Nephew  Secondary Emergency Contact: Leonor Trimbleody   [place]  Mobile Phone: [phone]  Relation: Brother/Sister  Hearing or visual needs: None  Other needs: None  Preferred language: English   needed? No    Past Surgical History:  Past Surgical History:   Procedure Laterality Date    ABDOMEN SURGERY      x4 removed cysts and ovary removed    CARDIAC SURGERY      heart caths, no stents    CARPAL TUNNEL RELEASE Bilateral 2016    CHOLECYSTECTOMY, LAPAROSCOPIC  6/12/15    Dr. Huff    COLONOSCOPY      CT ABSCESS DRAINAGE SOFT TISSUE  2024    CT DRAIN SOFT TISSUE ABSCESS 2024 [facility] CT SCAN    CYSTOSCOPY N/A 2/10/2022    CYSTOSCOPY URETHRAL IMPLANT INJECTION performed by Sami Lanier MD at [facility] OR    CYSTOSCOPY N/A 2022    Cystoscopy Bladder Botox 200 units Left Stent Placement performed by Sami Lanier MD at [facility] OR    CYSTOSCOPY N/A 2023    CYSTOSCOPY WITH BLADDER BOTOX 200 UNITS performed by Sami Lanier MD at [facility] OR    CYSTOSCOPY W BIOPSY OF BLADDER N/A 2020    CYSTOSCOPY WITH BLADDER BIOPSIES performed by Chris Avila MD at [facility] OR    DILATATION, ESOPHAGUS      EGD      ELBOW SURGERY Right 10/7/2004    Dr. Cronin    ELBOW SURGERY Bilateral 2016

## 2025-01-17 NOTE — PLAN OF CARE
Problem: Chronic Conditions and Co-morbidities  Goal: Patient's chronic conditions and co-morbidity symptoms are monitored and maintained or improved  Outcome: Progressing  Flowsheets (Taken 1/16/2025 0745 by Sharri Meraz, RN)  Care Plan - Patient's Chronic Conditions and Co-Morbidity Symptoms are Monitored and Maintained or Improved: Monitor and assess patient's chronic conditions and comorbid symptoms for stability, deterioration, or improvement     Problem: Discharge Planning  Goal: Discharge to home or other facility with appropriate resources  Outcome: Progressing  Flowsheets (Taken 1/16/2025 5177 by Galina Moody, RN)  Discharge to home or other facility with appropriate resources:   Identify barriers to discharge with patient and caregiver   Arrange for interpreters to assist at discharge as needed   Arrange for needed discharge resources and transportation as appropriate     Problem: Pain  Goal: Verbalizes/displays adequate comfort level or baseline comfort level  Outcome: Progressing  Flowsheets (Taken 1/16/2025 1515 by Sharri Meraz, RN)  Verbalizes/displays adequate comfort level or baseline comfort level: Encourage patient to monitor pain and request assistance     Problem: Safety - Adult  Goal: Free from fall injury  Outcome: Progressing  Flowsheets (Taken 1/17/2025 0302)  Free From Fall Injury:   Instruct family/caregiver on patient safety   Based on caregiver fall risk screen, instruct family/caregiver to ask for assistance with transferring infant if caregiver noted to have fall risk factors     Problem: Neurosensory - Adult  Goal: Achieves stable or improved neurological status  Outcome: Progressing  Flowsheets (Taken 1/17/2025 0302)  Achieves stable or improved neurological status:   Assess for and report changes in neurological status   Initiate measures to prevent increased intracranial pressure   Maintain blood pressure and fluid volume within ordered parameters to optimize cerebral

## 2025-01-17 NOTE — PLAN OF CARE
Problem: Discharge Planning  Goal: Discharge to home or other facility with appropriate resources  1/17/2025 1455 by Andra Maya, MSW, LSW  Outcome: Progressing  Flowsheets (Taken 1/17/2025 1455)  Discharge to home or other facility with appropriate resources: Identify barriers to discharge with patient and caregiver  Note: Return to [facility], see  notes 1/17/25

## 2025-01-18 LAB
ANION GAP SERPL CALC-SCNC: 15 MEQ/L (ref 8–16)
BACTERIA SPEC AEROBE CULT: ABNORMAL
BACTERIA SPEC ANAEROBE CULT: ABNORMAL
BASOPHILS ABSOLUTE: 0 THOU/MM3 (ref 0–0.1)
BASOPHILS NFR BLD AUTO: 0.5 %
BUN SERPL-MCNC: 25 MG/DL (ref 7–22)
CALCIUM SERPL-MCNC: 10.3 MG/DL (ref 8.5–10.5)
CHLORIDE SERPL-SCNC: 95 MEQ/L (ref 98–111)
CO2 SERPL-SCNC: 23 MEQ/L (ref 23–33)
CREAT SERPL-MCNC: 0.9 MG/DL (ref 0.4–1.2)
DEPRECATED RDW RBC AUTO: 44.8 FL (ref 35–45)
EOSINOPHIL NFR BLD AUTO: 2.6 %
EOSINOPHILS ABSOLUTE: 0.2 THOU/MM3 (ref 0–0.4)
ERYTHROCYTE [DISTWIDTH] IN BLOOD BY AUTOMATED COUNT: 14.8 % (ref 11.5–14.5)
GFR SERPL CREATININE-BSD FRML MDRD: 70 ML/MIN/1.73M2
GLUCOSE BLD STRIP.AUTO-MCNC: 134 MG/DL (ref 70–108)
GLUCOSE BLD STRIP.AUTO-MCNC: 140 MG/DL (ref 70–108)
GLUCOSE BLD STRIP.AUTO-MCNC: 150 MG/DL (ref 70–108)
GLUCOSE BLD STRIP.AUTO-MCNC: 159 MG/DL (ref 70–108)
GLUCOSE SERPL-MCNC: 145 MG/DL (ref 70–108)
GRAM STN SPEC: ABNORMAL
HCT VFR BLD AUTO: 34.5 % (ref 37–47)
HGB BLD-MCNC: 10.5 GM/DL (ref 12–16)
IMM GRANULOCYTES # BLD AUTO: 0.08 THOU/MM3 (ref 0–0.07)
IMM GRANULOCYTES NFR BLD AUTO: 1.1 %
LYMPHOCYTES ABSOLUTE: 1.8 THOU/MM3 (ref 1–4.8)
LYMPHOCYTES NFR BLD AUTO: 25.2 %
MCH RBC QN AUTO: 25.2 PG (ref 26–33)
MCHC RBC AUTO-ENTMCNC: 30.4 GM/DL (ref 32.2–35.5)
MCV RBC AUTO: 82.9 FL (ref 81–99)
MONOCYTES ABSOLUTE: 0.5 THOU/MM3 (ref 0.4–1.3)
MONOCYTES NFR BLD AUTO: 7.5 %
NEUTROPHILS ABSOLUTE: 4.6 THOU/MM3 (ref 1.8–7.7)
NEUTROPHILS NFR BLD AUTO: 63.1 %
NRBC BLD AUTO-RTO: 0 /100 WBC
ORGANISM: ABNORMAL
PLATELET # BLD AUTO: 422 THOU/MM3 (ref 130–400)
PMV BLD AUTO: 9.1 FL (ref 9.4–12.4)
POTASSIUM SERPL-SCNC: 4.8 MEQ/L (ref 3.5–5.2)
RBC # BLD AUTO: 4.16 MILL/MM3 (ref 4.2–5.4)
SODIUM SERPL-SCNC: 133 MEQ/L (ref 135–145)
WBC # BLD AUTO: 7.3 THOU/MM3 (ref 4.8–10.8)

## 2025-01-18 PROCEDURE — 6370000000 HC RX 637 (ALT 250 FOR IP): Performed by: NURSE PRACTITIONER

## 2025-01-18 PROCEDURE — 85025 COMPLETE CBC W/AUTO DIFF WBC: CPT

## 2025-01-18 PROCEDURE — 80048 BASIC METABOLIC PNL TOTAL CA: CPT

## 2025-01-18 PROCEDURE — 1200000000 HC SEMI PRIVATE

## 2025-01-18 PROCEDURE — 6370000000 HC RX 637 (ALT 250 FOR IP)

## 2025-01-18 PROCEDURE — 82948 REAGENT STRIP/BLOOD GLUCOSE: CPT

## 2025-01-18 PROCEDURE — 2500000003 HC RX 250 WO HCPCS

## 2025-01-18 PROCEDURE — 36415 COLL VENOUS BLD VENIPUNCTURE: CPT

## 2025-01-18 PROCEDURE — 99232 SBSQ HOSP IP/OBS MODERATE 35: CPT

## 2025-01-18 PROCEDURE — 6360000002 HC RX W HCPCS

## 2025-01-18 PROCEDURE — 1200000003 HC TELEMETRY R&B

## 2025-01-18 PROCEDURE — 99232 SBSQ HOSP IP/OBS MODERATE 35: CPT | Performed by: NURSE PRACTITIONER

## 2025-01-18 RX ORDER — HYDROCODONE BITARTRATE AND ACETAMINOPHEN 5; 325 MG/1; MG/1
1 TABLET ORAL EVERY 6 HOURS PRN
Status: SHIPPED | DISCHARGE
Start: 2025-01-18 | End: 2025-01-21

## 2025-01-18 RX ADMIN — CIPROFLOXACIN 400 MG: 400 INJECTION, SOLUTION INTRAVENOUS at 08:17

## 2025-01-18 RX ADMIN — FOLIC ACID 1 MG: 1 TABLET ORAL at 08:06

## 2025-01-18 RX ADMIN — OXYCODONE HYDROCHLORIDE AND ACETAMINOPHEN 1 TABLET: 5; 325 TABLET ORAL at 21:43

## 2025-01-18 RX ADMIN — CARVEDILOL 3.12 MG: 3.12 TABLET, FILM COATED ORAL at 21:35

## 2025-01-18 RX ADMIN — OXYCODONE HYDROCHLORIDE AND ACETAMINOPHEN 2 TABLET: 5; 325 TABLET ORAL at 13:10

## 2025-01-18 RX ADMIN — ATORVASTATIN CALCIUM 40 MG: 40 TABLET, FILM COATED ORAL at 21:35

## 2025-01-18 RX ADMIN — CARVEDILOL 3.12 MG: 3.12 TABLET, FILM COATED ORAL at 08:05

## 2025-01-18 RX ADMIN — LEVOTHYROXINE SODIUM 75 MCG: 0.07 TABLET ORAL at 03:19

## 2025-01-18 RX ADMIN — TRAZODONE HYDROCHLORIDE 200 MG: 100 TABLET ORAL at 21:39

## 2025-01-18 RX ADMIN — ENOXAPARIN SODIUM 40 MG: 100 INJECTION SUBCUTANEOUS at 08:05

## 2025-01-18 RX ADMIN — AMOXICILLIN AND CLAVULANATE POTASSIUM 1 TABLET: 875; 125 TABLET, FILM COATED ORAL at 21:35

## 2025-01-18 RX ADMIN — QUETIAPINE FUMARATE 200 MG: 100 TABLET ORAL at 21:35

## 2025-01-18 RX ADMIN — BUPROPION HYDROCHLORIDE 300 MG: 300 TABLET, EXTENDED RELEASE ORAL at 08:05

## 2025-01-18 RX ADMIN — INSULIN GLARGINE 14 UNITS: 100 INJECTION, SOLUTION SUBCUTANEOUS at 21:35

## 2025-01-18 RX ADMIN — OXYCODONE HYDROCHLORIDE AND ACETAMINOPHEN 2 TABLET: 5; 325 TABLET ORAL at 17:27

## 2025-01-18 RX ADMIN — Medication 6 MG: at 21:35

## 2025-01-18 RX ADMIN — ESCITALOPRAM OXALATE 20 MG: 20 TABLET ORAL at 08:05

## 2025-01-18 RX ADMIN — PANTOPRAZOLE SODIUM 40 MG: 40 TABLET, DELAYED RELEASE ORAL at 17:27

## 2025-01-18 RX ADMIN — SODIUM CHLORIDE, PRESERVATIVE FREE 10 ML: 5 INJECTION INTRAVENOUS at 19:45

## 2025-01-18 RX ADMIN — OXYCODONE HYDROCHLORIDE AND ACETAMINOPHEN 2 TABLET: 5; 325 TABLET ORAL at 03:19

## 2025-01-18 RX ADMIN — PANTOPRAZOLE SODIUM 40 MG: 40 TABLET, DELAYED RELEASE ORAL at 03:20

## 2025-01-18 RX ADMIN — OXYCODONE HYDROCHLORIDE AND ACETAMINOPHEN 2 TABLET: 5; 325 TABLET ORAL at 08:16

## 2025-01-18 RX ADMIN — POLYETHYLENE GLYCOL 3350 17 G: 17 POWDER, FOR SOLUTION ORAL at 08:24

## 2025-01-18 RX ADMIN — ROPINIROLE HYDROCHLORIDE 0.5 MG: 0.5 TABLET, FILM COATED ORAL at 21:35

## 2025-01-18 RX ADMIN — AMOXICILLIN AND CLAVULANATE POTASSIUM 1 TABLET: 875; 125 TABLET, FILM COATED ORAL at 16:00

## 2025-01-18 RX ADMIN — FERROUS SULFATE TAB 325 MG (65 MG ELEMENTAL FE) 325 MG: 325 (65 FE) TAB at 13:09

## 2025-01-18 RX ADMIN — FENOFIBRATE 54 MG: 54 TABLET ORAL at 08:05

## 2025-01-18 ASSESSMENT — PAIN DESCRIPTION - DESCRIPTORS
DESCRIPTORS: ACHING
DESCRIPTORS: ACHING
DESCRIPTORS: ACHING;DISCOMFORT
DESCRIPTORS: ACHING;SORE;STABBING
DESCRIPTORS: ACHING
DESCRIPTORS: ACHING

## 2025-01-18 ASSESSMENT — PAIN SCALES - WONG BAKER: WONGBAKER_NUMERICALRESPONSE: NO HURT

## 2025-01-18 ASSESSMENT — PAIN SCALES - GENERAL
PAINLEVEL_OUTOF10: 7
PAINLEVEL_OUTOF10: 9
PAINLEVEL_OUTOF10: 10
PAINLEVEL_OUTOF10: 5
PAINLEVEL_OUTOF10: 8
PAINLEVEL_OUTOF10: 9
PAINLEVEL_OUTOF10: 7
PAINLEVEL_OUTOF10: 8
PAINLEVEL_OUTOF10: 9
PAINLEVEL_OUTOF10: 5
PAINLEVEL_OUTOF10: 9
PAINLEVEL_OUTOF10: 7

## 2025-01-18 ASSESSMENT — PAIN DESCRIPTION - ORIENTATION
ORIENTATION: LEFT
ORIENTATION: LEFT;MID
ORIENTATION: LEFT;MID
ORIENTATION: LEFT

## 2025-01-18 ASSESSMENT — PAIN DESCRIPTION - LOCATION
LOCATION: ABDOMEN

## 2025-01-18 ASSESSMENT — PAIN - FUNCTIONAL ASSESSMENT
PAIN_FUNCTIONAL_ASSESSMENT: ACTIVITIES ARE NOT PREVENTED

## 2025-01-18 ASSESSMENT — PAIN DESCRIPTION - FREQUENCY
FREQUENCY: INTERMITTENT
FREQUENCY: INTERMITTENT

## 2025-01-18 ASSESSMENT — PAIN DESCRIPTION - PAIN TYPE
TYPE: SURGICAL PAIN
TYPE: SURGICAL PAIN

## 2025-01-18 ASSESSMENT — PAIN DESCRIPTION - ONSET
ONSET: ON-GOING
ONSET: ON-GOING

## 2025-01-18 NOTE — PLAN OF CARE
Problem: Chronic Conditions and Co-morbidities  Goal: Patient's chronic conditions and co-morbidity symptoms are monitored and maintained or improved  Outcome: Progressing     Problem: Discharge Planning  Goal: Discharge to home or other facility with appropriate resources  Outcome: Progressing     Problem: Pain  Goal: Verbalizes/displays adequate comfort level or baseline comfort level  Outcome: Progressing  Flowsheets (Taken 1/18/2025 0340)  Verbalizes/displays adequate comfort level or baseline comfort level: Encourage patient to monitor pain and request assistance     Problem: Safety - Adult  Goal: Free from fall injury  Outcome: Progressing     Problem: Skin/Tissue Integrity - Adult  Goal: Skin integrity remains intact  Outcome: Progressing     Problem: Musculoskeletal - Adult  Goal: Return ADL status to a safe level of function  Outcome: Progressing     Problem: Genitourinary - Adult  Goal: Absence of urinary retention  Outcome: Progressing

## 2025-01-18 NOTE — PLAN OF CARE
Problem: Chronic Conditions and Co-morbidities  Goal: Patient's chronic conditions and co-morbidity symptoms are monitored and maintained or improved  Outcome: Progressing  Flowsheets (Taken 1/17/2025 0830 by Sharri Meraz RN)  Care Plan - Patient's Chronic Conditions and Co-Morbidity Symptoms are Monitored and Maintained or Improved: Monitor and assess patient's chronic conditions and comorbid symptoms for stability, deterioration, or improvement     Problem: Discharge Planning  Goal: Discharge to home or other facility with appropriate resources  1/17/2025 2145 by Shawnee Pearson RN  Outcome: Progressing  Flowsheets (Taken 1/17/2025 1455 by Andra Maya, JAKE, REYES)  Discharge to home or other facility with appropriate resources: Identify barriers to discharge with patient and caregiver  1/17/2025 1455 by Andra Maya, JAKE, REYES  Outcome: Progressing  Flowsheets  Taken 1/17/2025 1455 by Andra Maya MSW, REYES  Discharge to home or other facility with appropriate resources: Identify barriers to discharge with patient and caregiver  Taken 1/17/2025 0830 by Sharri Meraz RN  Discharge to home or other facility with appropriate resources: Identify barriers to discharge with patient and caregiver  Note: Return to [facility], see  notes 1/17/25.     Problem: Pain  Goal: Verbalizes/displays adequate comfort level or baseline comfort level  Outcome: Progressing  Flowsheets  Taken 1/17/2025 1600 by Sharri Meraz RN  Verbalizes/displays adequate comfort level or baseline comfort level: Encourage patient to monitor pain and request assistance  Taken 1/17/2025 1400 by Sharri Meraz RN  Verbalizes/displays adequate comfort level or baseline comfort level: Encourage patient to monitor pain and request assistance  Taken 1/17/2025 0830 by Sharri Meraz RN  Verbalizes/displays adequate comfort level or baseline comfort level: Encourage patient to monitor pain and request assistance     Problem: Safety -

## 2025-01-19 ENCOUNTER — APPOINTMENT (OUTPATIENT)
Dept: GENERAL RADIOLOGY | Age: 70
DRG: 863 | End: 2025-01-19
Payer: MEDICARE

## 2025-01-19 ENCOUNTER — TELEPHONE (OUTPATIENT)
Dept: UROLOGY | Age: 70
End: 2025-01-19

## 2025-01-19 PROBLEM — R06.02 SHORTNESS OF BREATH: Status: ACTIVE | Noted: 2025-01-19

## 2025-01-19 LAB
ANION GAP SERPL CALC-SCNC: 7 MEQ/L (ref 8–16)
BASOPHILS ABSOLUTE: 0 THOU/MM3 (ref 0–0.1)
BASOPHILS NFR BLD AUTO: 0.4 %
BUN SERPL-MCNC: 24 MG/DL (ref 7–22)
CALCIUM SERPL-MCNC: 10.1 MG/DL (ref 8.5–10.5)
CHLORIDE SERPL-SCNC: 96 MEQ/L (ref 98–111)
CO2 SERPL-SCNC: 26 MEQ/L (ref 23–33)
CREAT SERPL-MCNC: 0.9 MG/DL (ref 0.4–1.2)
DEPRECATED RDW RBC AUTO: 46.2 FL (ref 35–45)
EOSINOPHIL NFR BLD AUTO: 2 %
EOSINOPHILS ABSOLUTE: 0.2 THOU/MM3 (ref 0–0.4)
ERYTHROCYTE [DISTWIDTH] IN BLOOD BY AUTOMATED COUNT: 15.2 % (ref 11.5–14.5)
GFR SERPL CREATININE-BSD FRML MDRD: 70 ML/MIN/1.73M2
GLUCOSE BLD STRIP.AUTO-MCNC: 143 MG/DL (ref 70–108)
GLUCOSE BLD STRIP.AUTO-MCNC: 167 MG/DL (ref 70–108)
GLUCOSE BLD STRIP.AUTO-MCNC: 186 MG/DL (ref 70–108)
GLUCOSE BLD STRIP.AUTO-MCNC: 186 MG/DL (ref 70–108)
GLUCOSE SERPL-MCNC: 165 MG/DL (ref 70–108)
HCT VFR BLD AUTO: 34.4 % (ref 37–47)
HGB BLD-MCNC: 10.5 GM/DL (ref 12–16)
IMM GRANULOCYTES # BLD AUTO: 0.08 THOU/MM3 (ref 0–0.07)
IMM GRANULOCYTES NFR BLD AUTO: 0.9 %
LYMPHOCYTES ABSOLUTE: 1.7 THOU/MM3 (ref 1–4.8)
LYMPHOCYTES NFR BLD AUTO: 18.7 %
MCH RBC QN AUTO: 25.4 PG (ref 26–33)
MCHC RBC AUTO-ENTMCNC: 30.5 GM/DL (ref 32.2–35.5)
MCV RBC AUTO: 83.1 FL (ref 81–99)
MONOCYTES ABSOLUTE: 0.5 THOU/MM3 (ref 0.4–1.3)
MONOCYTES NFR BLD AUTO: 5.5 %
NEUTROPHILS ABSOLUTE: 6.5 THOU/MM3 (ref 1.8–7.7)
NEUTROPHILS NFR BLD AUTO: 72.5 %
NRBC BLD AUTO-RTO: 0 /100 WBC
NT-PROBNP SERPL IA-MCNC: 412.5 PG/ML (ref 0–124)
PLATELET # BLD AUTO: 443 THOU/MM3 (ref 130–400)
PMV BLD AUTO: 8.6 FL (ref 9.4–12.4)
POTASSIUM SERPL-SCNC: 4.8 MEQ/L (ref 3.5–5.2)
RBC # BLD AUTO: 4.14 MILL/MM3 (ref 4.2–5.4)
SODIUM SERPL-SCNC: 129 MEQ/L (ref 135–145)
WBC # BLD AUTO: 8.9 THOU/MM3 (ref 4.8–10.8)

## 2025-01-19 PROCEDURE — 94640 AIRWAY INHALATION TREATMENT: CPT

## 2025-01-19 PROCEDURE — 2500000003 HC RX 250 WO HCPCS

## 2025-01-19 PROCEDURE — 6370000000 HC RX 637 (ALT 250 FOR IP)

## 2025-01-19 PROCEDURE — 99232 SBSQ HOSP IP/OBS MODERATE 35: CPT

## 2025-01-19 PROCEDURE — 6360000002 HC RX W HCPCS

## 2025-01-19 PROCEDURE — 82948 REAGENT STRIP/BLOOD GLUCOSE: CPT

## 2025-01-19 PROCEDURE — 85025 COMPLETE CBC W/AUTO DIFF WBC: CPT

## 2025-01-19 PROCEDURE — 71046 X-RAY EXAM CHEST 2 VIEWS: CPT

## 2025-01-19 PROCEDURE — 1200000000 HC SEMI PRIVATE

## 2025-01-19 PROCEDURE — 99232 SBSQ HOSP IP/OBS MODERATE 35: CPT | Performed by: NURSE PRACTITIONER

## 2025-01-19 PROCEDURE — 6370000000 HC RX 637 (ALT 250 FOR IP): Performed by: NURSE PRACTITIONER

## 2025-01-19 PROCEDURE — 36415 COLL VENOUS BLD VENIPUNCTURE: CPT

## 2025-01-19 PROCEDURE — 80048 BASIC METABOLIC PNL TOTAL CA: CPT

## 2025-01-19 PROCEDURE — 83880 ASSAY OF NATRIURETIC PEPTIDE: CPT

## 2025-01-19 RX ADMIN — FOLIC ACID 1 MG: 1 TABLET ORAL at 09:13

## 2025-01-19 RX ADMIN — ESCITALOPRAM OXALATE 20 MG: 20 TABLET ORAL at 09:13

## 2025-01-19 RX ADMIN — ATORVASTATIN CALCIUM 40 MG: 40 TABLET, FILM COATED ORAL at 19:40

## 2025-01-19 RX ADMIN — PANTOPRAZOLE SODIUM 40 MG: 40 TABLET, DELAYED RELEASE ORAL at 05:39

## 2025-01-19 RX ADMIN — OXYCODONE HYDROCHLORIDE AND ACETAMINOPHEN 2 TABLET: 5; 325 TABLET ORAL at 21:37

## 2025-01-19 RX ADMIN — ROPINIROLE HYDROCHLORIDE 0.5 MG: 0.5 TABLET, FILM COATED ORAL at 19:40

## 2025-01-19 RX ADMIN — Medication 6 MG: at 21:37

## 2025-01-19 RX ADMIN — ALBUTEROL SULFATE 2.5 MG: 2.5 SOLUTION RESPIRATORY (INHALATION) at 06:26

## 2025-01-19 RX ADMIN — PANTOPRAZOLE SODIUM 40 MG: 40 TABLET, DELAYED RELEASE ORAL at 16:27

## 2025-01-19 RX ADMIN — INSULIN GLARGINE 14 UNITS: 100 INJECTION, SOLUTION SUBCUTANEOUS at 19:39

## 2025-01-19 RX ADMIN — OXYCODONE HYDROCHLORIDE AND ACETAMINOPHEN 2 TABLET: 5; 325 TABLET ORAL at 05:39

## 2025-01-19 RX ADMIN — AMOXICILLIN AND CLAVULANATE POTASSIUM 1 TABLET: 875; 125 TABLET, FILM COATED ORAL at 19:40

## 2025-01-19 RX ADMIN — CARVEDILOL 3.12 MG: 3.12 TABLET, FILM COATED ORAL at 19:40

## 2025-01-19 RX ADMIN — FENOFIBRATE 54 MG: 54 TABLET ORAL at 09:13

## 2025-01-19 RX ADMIN — ENOXAPARIN SODIUM 40 MG: 100 INJECTION SUBCUTANEOUS at 09:12

## 2025-01-19 RX ADMIN — CARVEDILOL 3.12 MG: 3.12 TABLET, FILM COATED ORAL at 09:12

## 2025-01-19 RX ADMIN — INSULIN LISPRO 2 UNITS: 100 INJECTION, SOLUTION INTRAVENOUS; SUBCUTANEOUS at 11:45

## 2025-01-19 RX ADMIN — QUETIAPINE FUMARATE 200 MG: 100 TABLET ORAL at 21:38

## 2025-01-19 RX ADMIN — OXYCODONE HYDROCHLORIDE AND ACETAMINOPHEN 2 TABLET: 5; 325 TABLET ORAL at 16:28

## 2025-01-19 RX ADMIN — TRAZODONE HYDROCHLORIDE 200 MG: 100 TABLET ORAL at 21:37

## 2025-01-19 RX ADMIN — SODIUM CHLORIDE, PRESERVATIVE FREE 10 ML: 5 INJECTION INTRAVENOUS at 09:13

## 2025-01-19 RX ADMIN — INSULIN LISPRO 2 UNITS: 100 INJECTION, SOLUTION INTRAVENOUS; SUBCUTANEOUS at 16:27

## 2025-01-19 RX ADMIN — OXYCODONE HYDROCHLORIDE AND ACETAMINOPHEN 2 TABLET: 5; 325 TABLET ORAL at 11:41

## 2025-01-19 RX ADMIN — SODIUM CHLORIDE, PRESERVATIVE FREE 10 ML: 5 INJECTION INTRAVENOUS at 19:39

## 2025-01-19 RX ADMIN — AMOXICILLIN AND CLAVULANATE POTASSIUM 1 TABLET: 875; 125 TABLET, FILM COATED ORAL at 09:12

## 2025-01-19 RX ADMIN — BUPROPION HYDROCHLORIDE 300 MG: 300 TABLET, EXTENDED RELEASE ORAL at 09:12

## 2025-01-19 RX ADMIN — LEVOTHYROXINE SODIUM 150 MCG: 0.15 TABLET ORAL at 05:40

## 2025-01-19 ASSESSMENT — PAIN SCALES - GENERAL
PAINLEVEL_OUTOF10: 7
PAINLEVEL_OUTOF10: 8
PAINLEVEL_OUTOF10: 4
PAINLEVEL_OUTOF10: 8
PAINLEVEL_OUTOF10: 9
PAINLEVEL_OUTOF10: 9

## 2025-01-19 ASSESSMENT — PAIN DESCRIPTION - ORIENTATION
ORIENTATION: LEFT
ORIENTATION: LEFT;MID
ORIENTATION: LEFT;MID
ORIENTATION: LEFT
ORIENTATION: LEFT

## 2025-01-19 ASSESSMENT — PAIN DESCRIPTION - LOCATION
LOCATION: ABDOMEN
LOCATION: ABDOMEN
LOCATION: FLANK
LOCATION: ABDOMEN
LOCATION: ABDOMEN

## 2025-01-19 ASSESSMENT — PAIN - FUNCTIONAL ASSESSMENT
PAIN_FUNCTIONAL_ASSESSMENT: ACTIVITIES ARE NOT PREVENTED

## 2025-01-19 ASSESSMENT — PAIN DESCRIPTION - DESCRIPTORS
DESCRIPTORS: ACHING;DISCOMFORT
DESCRIPTORS: ACHING;SORE
DESCRIPTORS: ACHING;DISCOMFORT
DESCRIPTORS: ACHING;DISCOMFORT
DESCRIPTORS: ACHING;SORE;DISCOMFORT

## 2025-01-19 NOTE — PLAN OF CARE
Problem: Chronic Conditions and Co-morbidities  Goal: Patient's chronic conditions and co-morbidity symptoms are monitored and maintained or improved  1/19/2025 0212 by Luke Lyle RN  Outcome: Progressing  Flowsheets (Taken 1/17/2025 0830 by Sharri Meraz, RN)  Care Plan - Patient's Chronic Conditions and Co-Morbidity Symptoms are Monitored and Maintained or Improved: Monitor and assess patient's chronic conditions and comorbid symptoms for stability, deterioration, or improvement  1/18/2025 1804 by Alejandra Kim RN  Outcome: Progressing     Problem: Discharge Planning  Goal: Discharge to home or other facility with appropriate resources  1/19/2025 0212 by Luke Lyle RN  Outcome: Progressing  Flowsheets (Taken 1/17/2025 1455 by Andra Maya, MSW, LSW)  Discharge to home or other facility with appropriate resources: Identify barriers to discharge with patient and caregiver  1/18/2025 1804 by Alejandra Kim RN  Outcome: Progressing     Problem: Pain  Goal: Verbalizes/displays adequate comfort level or baseline comfort level  1/19/2025 0212 by Luke Lyle RN  Outcome: Progressing  Flowsheets (Taken 1/18/2025 0812 by Alejandra Kim, INDY)  Verbalizes/displays adequate comfort level or baseline comfort level: Encourage patient to monitor pain and request assistance  1/18/2025 1804 by Alejandra Kim RN  Outcome: Progressing  Flowsheets (Taken 1/18/2025 0812)  Verbalizes/displays adequate comfort level or baseline comfort level: Encourage patient to monitor pain and request assistance     Problem: Safety - Adult  Goal: Free from fall injury  1/19/2025 0212 by Luke Lyle RN  Outcome: Progressing  Flowsheets (Taken 1/17/2025 0302 by Shawnee Pearson, RN)  Free From Fall Injury:   Instruct family/caregiver on patient safety   Based on caregiver fall risk screen, instruct family/caregiver to ask for assistance with transferring infant if caregiver noted to have fall risk factors  1/18/2025

## 2025-01-19 NOTE — PLAN OF CARE
Problem: Chronic Conditions and Co-morbidities  Goal: Patient's chronic conditions and co-morbidity symptoms are monitored and maintained or improved  Outcome: Progressing     Problem: Discharge Planning  Goal: Discharge to home or other facility with appropriate resources  Outcome: Progressing     Problem: Pain  Goal: Verbalizes/displays adequate comfort level or baseline comfort level  Outcome: Progressing     Problem: Safety - Adult  Goal: Free from fall injury  Outcome: Progressing     Problem: Neurosensory - Adult  Goal: Achieves stable or improved neurological status  Outcome: Progressing     Problem: Respiratory - Adult  Goal: Achieves optimal ventilation and oxygenation  Outcome: Progressing     Problem: Skin/Tissue Integrity - Adult  Goal: Skin integrity remains intact  Outcome: Progressing     Problem: Musculoskeletal - Adult  Goal: Return ADL status to a safe level of function  Outcome: Progressing     Problem: Genitourinary - Adult  Goal: Absence of urinary retention  Outcome: Progressing

## 2025-01-20 VITALS
OXYGEN SATURATION: 98 % | HEIGHT: 64 IN | BODY MASS INDEX: 32.44 KG/M2 | DIASTOLIC BLOOD PRESSURE: 63 MMHG | SYSTOLIC BLOOD PRESSURE: 133 MMHG | RESPIRATION RATE: 16 BRPM | TEMPERATURE: 97.7 F | WEIGHT: 190 LBS | HEART RATE: 54 BPM

## 2025-01-20 LAB
ANION GAP SERPL CALC-SCNC: 10 MEQ/L (ref 8–16)
BUN SERPL-MCNC: 23 MG/DL (ref 7–22)
CALCIUM SERPL-MCNC: 10.3 MG/DL (ref 8.5–10.5)
CHLORIDE SERPL-SCNC: 99 MEQ/L (ref 98–111)
CO2 SERPL-SCNC: 26 MEQ/L (ref 23–33)
CREAT SERPL-MCNC: 1 MG/DL (ref 0.4–1.2)
GFR SERPL CREATININE-BSD FRML MDRD: 62 ML/MIN/1.73M2
GLUCOSE BLD STRIP.AUTO-MCNC: 125 MG/DL (ref 70–108)
GLUCOSE SERPL-MCNC: 121 MG/DL (ref 70–108)
POTASSIUM SERPL-SCNC: 4.7 MEQ/L (ref 3.5–5.2)
SODIUM SERPL-SCNC: 135 MEQ/L (ref 135–145)

## 2025-01-20 PROCEDURE — 2500000003 HC RX 250 WO HCPCS

## 2025-01-20 PROCEDURE — 99239 HOSP IP/OBS DSCHRG MGMT >30: CPT | Performed by: NURSE PRACTITIONER

## 2025-01-20 PROCEDURE — 82948 REAGENT STRIP/BLOOD GLUCOSE: CPT

## 2025-01-20 PROCEDURE — 6360000002 HC RX W HCPCS

## 2025-01-20 PROCEDURE — 36415 COLL VENOUS BLD VENIPUNCTURE: CPT

## 2025-01-20 PROCEDURE — 6370000000 HC RX 637 (ALT 250 FOR IP)

## 2025-01-20 PROCEDURE — 6370000000 HC RX 637 (ALT 250 FOR IP): Performed by: NURSE PRACTITIONER

## 2025-01-20 PROCEDURE — 80048 BASIC METABOLIC PNL TOTAL CA: CPT

## 2025-01-20 RX ADMIN — BUPROPION HYDROCHLORIDE 300 MG: 300 TABLET, EXTENDED RELEASE ORAL at 08:08

## 2025-01-20 RX ADMIN — LEVOTHYROXINE SODIUM 75 MCG: 0.07 TABLET ORAL at 03:51

## 2025-01-20 RX ADMIN — ENOXAPARIN SODIUM 40 MG: 100 INJECTION SUBCUTANEOUS at 08:09

## 2025-01-20 RX ADMIN — SODIUM CHLORIDE, PRESERVATIVE FREE 10 ML: 5 INJECTION INTRAVENOUS at 08:09

## 2025-01-20 RX ADMIN — AMOXICILLIN AND CLAVULANATE POTASSIUM 1 TABLET: 875; 125 TABLET, FILM COATED ORAL at 08:08

## 2025-01-20 RX ADMIN — OXYCODONE HYDROCHLORIDE AND ACETAMINOPHEN 2 TABLET: 5; 325 TABLET ORAL at 08:09

## 2025-01-20 RX ADMIN — ESCITALOPRAM OXALATE 20 MG: 20 TABLET ORAL at 08:09

## 2025-01-20 RX ADMIN — OXYCODONE HYDROCHLORIDE AND ACETAMINOPHEN 2 TABLET: 5; 325 TABLET ORAL at 02:08

## 2025-01-20 RX ADMIN — CARVEDILOL 3.12 MG: 3.12 TABLET, FILM COATED ORAL at 08:09

## 2025-01-20 RX ADMIN — FENOFIBRATE 54 MG: 54 TABLET ORAL at 08:09

## 2025-01-20 RX ADMIN — PANTOPRAZOLE SODIUM 40 MG: 40 TABLET, DELAYED RELEASE ORAL at 03:51

## 2025-01-20 RX ADMIN — FOLIC ACID 1 MG: 1 TABLET ORAL at 08:09

## 2025-01-20 ASSESSMENT — PAIN DESCRIPTION - DESCRIPTORS
DESCRIPTORS: ACHING;DISCOMFORT
DESCRIPTORS: SORE;ACHING

## 2025-01-20 ASSESSMENT — PAIN SCALES - GENERAL
PAINLEVEL_OUTOF10: 8
PAINLEVEL_OUTOF10: 9

## 2025-01-20 ASSESSMENT — PAIN DESCRIPTION - ORIENTATION
ORIENTATION: LEFT
ORIENTATION: LEFT

## 2025-01-20 ASSESSMENT — PAIN DESCRIPTION - LOCATION
LOCATION: FLANK
LOCATION: FLANK

## 2025-01-20 NOTE — PROGRESS NOTES
Pharmacy Renal Adjustment    Pharmacy renally adjusted the following medication(s) per P&T approved policy: fenofibrate    Recent Labs     01/15/25  2014 01/17/25  0659   BUN 19* 22   CREATININE 1.0 1.1     eGFR:   Recent Labs     01/15/25  2014 01/17/25  0659   LABGLOM 62 55*     Estimated Creatinine Clearance: 53 mL/min (based on SCr of 1.1 mg/dL).    Assessment:  MARILY    Plan:   Decrease fenofibrate from 160 mg to 54 mg once daily    Please call pharmacy with any questions.    Sofia Cruz, Pharm.D., BCPS, BCCCP 1/17/2025 11:35 AM        
    Hospitalist Progress Note    Patient:  Elli Coulter      Unit/Bed:6K-17/017-A    YOB: 1955    MRN: [id number]       Acct: [id number]     PCP: Ryan Montalvo MD    Date of Admission: 1/15/2025    Assessment/Plan:    Perinephritic fluid collection, status post nephrectomy: Retroperitoneal fluid collection seen on CT abdomen. Urology consulted due to retroperitoneal fluid collection status post nephrectomy.  They recommended transfer to tertiary center for palliative nephrectomy care.  Patient refused transfer. IR consulted for drainage. 50 mL purulent fluid drained. Drain placed. Cipro prescribed post nephrectomy. Cipro dose given in ER. Due to leukocytosis and purulent fluid drained, Cipro restarted BID for five days. Will go home with drain.   Acute on chronic abdominal pain. Stop Norco. Start Percocet.   Hyponatremia: Improved. Repeat BMP in AM.  Leukocytosis. Downward trend. Afebrile.   HIgh anion gap metabolic acidosis. Start IVF. Repeat BMP in AM.   Chronic heart failure with preserved ejection fraction, not decompensated: Most recent EF showed 60-65% with normal systolic function and wall motion. No signs of decompensation. Managed with carvedilol. Continue during inpatient. Sodium and fluid restriction. Daily weights. Strict I/Os.  COPD without exacerbation:Managed at home with Linzess and O2 with exertion. Albuterol as needed for wheezing.  Type 2 Diabetes Mellitus: Managed with pioglitazone and glargine insulin 14 units. Held during admission for close glycemic control. Blood sugars WNL. Low dose sliding scale insulin. Continue glargine insulin. POC glucose ACHS x4. Hypoglycemia protocols.   Acquired Hypothyroidism: Managed at home with levothyroxine.   Diverticulosis: Historical. No mention of diverticulosis or diverticulitis on CT abdomen 1/15/25  RLS: Managed with Requip.  Anxiety/Depression: Managed with Lexapro, Wellbutrin and Seroquel.   GERD: Managed with Pantoprazole.   HLD: 
    Mary Ellen Ray PA-C  Urology Progress Note  Reason for Consult:  retroperitoneal fluid collection in setting of s/p nephrectomy   Requesting Physician:  medicine     History Obtained From:  patient, electronic medical record     HPI:Ms. Lynne is a 69-year-old female that presented to [facility] with complaints of abdominal pain.     Hx of a recurrent L sided perinephric/retroperitoneal abscess. She has had several hospitalization over the last couple years, with the first being on 1/15/2023. IR has performed multiple procedures to assist with drainage of these formed abscesses. Given the recurrent formation of the abscess despite several interventions, the patient was seen by [facility] on 4/30/2024. She was encouraged to maintain consistent hydration and get a CTU to assess for connection with urinary system. Future treatment options per [facility] included IR drainage at [facility] or undergoing a refractory nephrectomy. On 11/14/2024 she underwent a simple L nephrectomy with the [facility] [place]. [facility] consulted this admission for Ct findings of a rim enhancing fluid collection in the L retroperitoneum measuring 10.8 cm in size- increased from prior. Our group recommended transfer to a tertiary care center but the patient has refused.     Also with hx of kidney stones. Underwent cystoscopy left ureteroscopy, left holmium laser lithotripsy and stent exchange 1/12/23 with Dr. Lanier.      Hx of OAB and incontinence. She did have an interstim which was ultimately removed. She is s/p cystoscopy with bladder botox 200 units by Dr. Lanier on 8/22/2023.      Medical hx of chronic HF, COPD, type 2 DM, hypothyroidism, diverticulosis, RLS, anxiety, depression, GERD, and HLD          Subjective: Elli Coulter is a 69 y.o. female.    His/Her current Diet is: ADULT DIET; Regular; 3 carb choices (45 gm/meal); Low Sodium (2 gm); 2000 ml.    Since the previous note, the patient reports the following:  No acute issues 
    Mary Ellen Ray PA-C  Urology Progress Note  Reason for Consult:  retroperitoneal fluid collection in setting of s/p nephrectomy   Requesting Physician:  medicine     History Obtained From:  patient, electronic medical record    HPI:Ms. Lynne is a 69-year-old female that presented to [facility] with complaints of abdominal pain.     Hx of a recurrent L sided perinephric/retroperitoneal abscess. She has had several hospitalization over the last couple years, with the first being on 1/15/2023. IR has performed multiple procedures to assist with drainage of these formed abscesses. Given the recurrent formation of the abscess despite several interventions, the patient was seen by [facility] on 4/30/2024. She was encouraged to maintain consistent hydration and get a CTU to assess for connection with urinary system. Future treatment options per [facility] included [facility] at [facility] or undergoing a refractory nephrectomy. On 11/14/2024 she underwent a simple L nephrectomy with the [facility]. [facility]rology consulted this admission for Ct findings of a rim enhancing fluid collection in the L retroperitoneum measuring 10.8 cm in size- increased from prior. Our group recommended transfer to a tertiary care center but the patient has refused.     Also with hx of kidney stones. Underwent cystoscopy left ureteroscopy, left holmium laser lithotripsy and stent exchange 1/12/23 with Dr. Lanier.      Hx of OAB and incontinence. She did have an interstim which was ultimately removed. She is s/p cystoscopy with bladder botox 200 units by Dr. Lanier on 8/22/2023.     Medical hx of chronic HF, COPD, type 2 DM, hypothyroidism, diverticulosis, RLS, anxiety, depression, GERD, and HLD       Subjective: Elli Coulter is a 69 y.o. female.      His/Her current Diet is: ADULT DIET; Regular; 3 carb choices (45 gm/meal); Low Sodium (2 gm); 2000 ml.    Since the previous note, the patient reports the following:  No acute issues 
    Urology Progress Note    Reason for Consult:  retroperitoneal fluid collection in setting of s/p nephrectomy   Requesting Physician:  medicine     History Obtained From:  patient, electronic medical record       Subjective: \"    Patient is resting in bed, voiding spontaneously, ambulating with assistance, tolerating regular diet, denies any nausea or vomiting. There are complaints of controlled pain at this time.    Tan fluid in drainage bag, approx 100ml    L fluid collection - chronic, s/p L nephrectomy, pain controlled with norco  UTI - Ux pending, abx per primary     URO recs she follow up with established urologist at discharge, if she would prefer to stay local we will see outpatient with drain in place     URO to follow          Vitals:  /60   Pulse 59   Temp 97.8 °F (36.6 °C) (Oral)   Resp 16   Ht 1.626 m (5' 4\")   Wt 86.2 kg (190 lb)   SpO2 95%   BMI 32.61 kg/m²   Temp  Av.1 °F (36.7 °C)  Min: 97.8 °F (36.6 °C)  Max: 98.6 °F (37 °C)    Intake/Output Summary (Last 24 hours) at 2025 1100  Last data filed at 2025 0857  Gross per 24 hour   Intake 360 ml   Output 100 ml   Net 260 ml       Social History     Socioeconomic History    Marital status:      Spouse name: Not on file    Number of children: 3    Years of education: 12    Highest education level: Not on file   Occupational History    Occupation: on disability     Employer: [employer]   Tobacco Use    Smoking status: Every Day     Current packs/day: 1.00     Average packs/day: 1 pack/day for 47.7 years (47.7 ttl pk-yrs)     Types: Cigarettes     Start date: 1977    Smokeless tobacco: Never    Tobacco comments:     Trying to quit   Vaping Use    Vaping status: Never Used   Substance and Sexual Activity    Alcohol use: No     Comment: none for 2 years    Drug use: Not Currently     Frequency: 1.0 times per week     Types: Cocaine    Sexual activity: Not Currently   Other Topics Concern    
1335 Patient received in CT scanning for left retroperitoneal drain insertion. Monitor applied.   1340 Assisted patient to bathroom then to CT table.  1408 Dr. Clemens here; spoke to patient. This procedure has been fully reviewed with the patient and written informed consent has been obtained.   1410 Pre scan started.  1414 Procedure started with Dr. Clemens.  1417 Samples collected and sent to lab for further review.   1419 Line sutured in place. 50 ml of purulent fluid out.  1421 Procedure completed; patient tolerated well. Post scan obtained.   1427 Patient on bed; comfort ensured.  1428 patient taken to 6K via bed/transport. Pt alert and oriented x3; follows commands. Skin pink, warm, and dry. Respirations easy, regular, and nonlabored.    
[facility]  OCCUPATIONAL THERAPY MISSED TREATMENT NOTE  STRZ RENAL TELEMETRY 6K  6K-17/017-A      Date: 2025  Patient Name: Elli Coulter        CSN: [id number]   : 1955  (69 y.o.)  Gender: female                REASON FOR MISSED TREATMENT:  OT attempted to see patient 2x this date. 1st attempt pt declining requesting for OT to come back after 9:30AM. 2nd attempt at 9:45 pt is sleeping and continues requesting to rest. OT will check back as time allows.                 
Discharge teaching and instructions for diagnosis/procedure of Abdominal pain completed with patient using teachback method. AVS reviewed, faxed blue envelope given to [facility] . Report given to INDY Hartman at [facility]. Discharged in a wheelchair to  skilled nursing per EMS transportation.             
[facility]  INPATIENT PHYSICAL THERAPY  EVALUATION  STR RENAL TELEMETRY 6K - 6K-17/017-A    Discharge Recommendations: Subacute/Skilled Nursing Facility  Equipment Recommendations: No             Time In: 1342  Time Out: 1400  Timed Code Treatment Minutes: 10 Minutes  Minutes: 18     Date: 2025  Patient Name: Elli Coulter,  Gender:  female        MRN: [id number]  : 1955  (69 y.o.)      Referring Practitioner: Cosme Ventura PA-C  Diagnosis: Lower abdominal pain  Additional Pertinent Hx: per chart review: Elli Coulter is a 69 y.o. female with PMHx of renal abscess status post nephrectomy, diverticulosis, COPD, HFpEF, who presents to [facility] with lower abdominal pain.  Patient orts that she is had ongoing pain for the last 2 months.  She says it has been present ever since her nephrectomy on 2024 at [place].  She reports that it has associated nausea, vomiting, constipation, epigastric pain, distention.  She denies any fever, chills, chest pain, shortness of breath, chest tightness, weakness, lightheadedness, dizziness.  She endorses 10 out of 10 pain in the midline of her lower abdomen that extends to the bilateral sides of her abdomen.  She states the pain is constant but gets worse when she moves or when pressed on.  She denies any association with food.  She denies any change in urination.     Restrictions/Precautions:  Restrictions/Precautions: Fall Risk     Subjective:  Chart Reviewed: Yes  Patient assessed for rehabilitation services?: Yes  Subjective: Nurse approved visit.  Patient was up in bathroom upon entering.  Patient agreeable to therapy.  States she spends most of her day in bed at nursing facility.    General:   Patient is alert and oriented times 4, able to follow one step commands.      Pain: Patient c/o (L) hip and (L) lower quadrant pain with attempts of AROM to (L) hip    Vitals: Vitals not assessed per clinical judgement, see 
TRANSFER - OUT REPORT:    Verbal report given to Sharri RN(name) on Elli Coulter being transferred to (unit) for routine progression of patient care       Report consisted of patient's Situation, Background, Assessment and   Recommendations(SBAR).     Information from the following report(s) Nurse Handoff Report, Surgery Report, and MAR was reviewed with the receiving nurse.    Opportunity for questions and clarification was provided.      Patient transported with:   Monitor    
              **This report has been created using voice recognition software.  It may contain   minor errors which are inherent in voice recognition technology.**               Electronically signed by Dr. Kt Clemens      CT ABSCESS DRAINAGE W CATH PLACEMENT S&I   Final Result   Status post successful abscess drainage procedure..               **This report has been created using voice recognition software.  It may contain   minor errors which are inherent in voice recognition technology.**               Electronically signed by Dr. Kt Clemens      CT ABDOMEN PELVIS W IV CONTRAST Additional Contrast? None   Final Result   Impression:   Interval left nephrectomy. Stranding and ill-defined enhancement in the    nephrectomy bed could be infectious/inflammatory. Attention on follow up    is recommended.   Increase in size of rim enhancing fluid collection in the left    retroperitoneum, currently measuring up to 10.8 cm and previously    measuring up to 5.9 cm. There has been removal of the previously seen    percutaneous drain.   No bowel obstruction.      This document has been electronically signed by: Rox Cornejo MD    on 01/15/2025 09:20 PM      All CTs at this facility use dose modulation techniques and iterative    reconstructions, and/or weight-based dosing   when appropriate to reduce radiation to a low as reasonably achievable.          Diet: ADULT DIET; Regular; 3 carb choices (45 gm/meal); Low Sodium (2 gm); 2000 ml    DVT prophylaxis: [x] Lovenox                                 [] SCDs                                 [] SQ Heparin                                 [] Encourage ambulation           [] Already on Anticoagulation     Disposition:    [x] Home       [] TCU       [] Rehab       [] Psych       [] SNF       [] Long Term Care Facility       [] Other-    Code Status: Full Code    PT/OT Eval Status: Yes        Electronically signed by BA Diaz CNP on 1/18/2025 at 
[] Rehab       [] Psych       [] SNF       [] Long Term Care Facility       [] Other-    Code Status: Full Code    PT/OT Eval Status: Yes        Electronically signed by BA Diaz CNP on 1/19/2025 at 1:13 PM

## 2025-01-20 NOTE — CARE COORDINATION
1/20/25, 9:44 AM EST    Patient goals/plan/ treatment preferences discussed by  and .  Patient goals/plan/ treatment preferences reviewed with patient/ family.  Patient/ family verbalize understanding of discharge plan and are in agreement with goal/plan/treatment preferences.  Understanding was demonstrated using the teach back method.  AVS provided by RN at time of discharge, which includes all necessary medical information pertaining to the patients current course of illness, treatment, post-discharge goals of care, and treatment preferences.     Services At/After Discharge: Skilled Nursing Facility (SNF)    Patient discharging today to [facility]. SW updated Elliot of discharge and Ambulette arranged by night shift around 9:30am.

## 2025-01-20 NOTE — DISCHARGE SUMMARY
glargine 100 UNIT/ML injection vial  Commonly known as: LANTUS     levothyroxine 75 MCG tablet  Commonly known as: SYNTHROID  Take 1 tablet by mouth daily everyday except Sunday take 150 mcg.     lidocaine 4 % external patch  Place 1 patch onto the skin daily     Linzess 145 MCG capsule  Generic drug: linaclotide     loperamide 2 MG capsule  Commonly known as: IMODIUM     magnesium hydroxide 400 MG/5ML suspension  Commonly known as: MILK OF MAGNESIA     melatonin 3 MG Tabs tablet  Take 1 tablet by mouth nightly     miconazole nitrate 2 % Aerp  Commonly known as: LOTRIMIN AF     nitroGLYCERIN 0.4 MG SL tablet  Commonly known as: NITROSTAT     ondansetron 4 MG disintegrating tablet  Commonly known as: ZOFRAN-ODT  Take 1 tablet by mouth every 8 hours as needed for Nausea or Vomiting     pantoprazole 40 MG tablet  Commonly known as: PROTONIX  Take 1 tablet by mouth 2 times daily (before meals)     pioglitazone 15 MG tablet  Commonly known as: ACTOS     polyethylene glycol 17 g packet  Commonly known as: GLYCOLAX  Take 1 packet by mouth daily as needed for Constipation     pyridoxine 50 MG tablet  Commonly known as: B-6  Take 1 tablet by mouth daily     QUETIAPINE FUMARATE PO     rOPINIRole 0.5 MG tablet  Commonly known as: REQUIP     sterile water for irrigation  Irrigate with 100 ml ns or sterile water daily and prn for occlusion     traZODone 100 MG tablet  Commonly known as: DESYREL  Take 2 tablets by mouth nightly     UTI-STAT PO     vitamin D 50 MCG (2000 UT) Caps capsule  Commonly known as: CHOLECALCIFEROL           * This list has 4 medication(s) that are the same as other medications prescribed for you. Read the directions carefully, and ask your doctor or other care provider to review them with you.                   Where to Get Your Medications        You can get these medications from any pharmacy    Bring a paper prescription for each of these medications  HYDROcodone-acetaminophen 5-325 MG per tablet

## 2025-01-21 LAB — BACTERIA BLD AEROBE CULT: NORMAL

## 2025-02-05 ENCOUNTER — HOSPITAL ENCOUNTER (EMERGENCY)
Age: 70
Discharge: HOME | DRG: 907 | End: 2025-02-06
Attending: EMERGENCY MEDICINE
Payer: MEDICARE

## 2025-02-05 DIAGNOSIS — Z93.6 NEPHROSTOMY PRESENT (HCC): Primary | ICD-10-CM

## 2025-02-05 DIAGNOSIS — R10.9 FLANK PAIN: ICD-10-CM

## 2025-02-05 PROCEDURE — 36415 COLL VENOUS BLD VENIPUNCTURE: CPT

## 2025-02-05 PROCEDURE — 80053 COMPREHEN METABOLIC PANEL: CPT

## 2025-02-05 PROCEDURE — 85025 COMPLETE CBC W/AUTO DIFF WBC: CPT

## 2025-02-05 PROCEDURE — 87636 SARSCOV2 & INF A&B AMP PRB: CPT

## 2025-02-05 PROCEDURE — 99285 EMERGENCY DEPT VISIT HI MDM: CPT

## 2025-02-05 ASSESSMENT — PAIN DESCRIPTION - ORIENTATION: ORIENTATION: LEFT

## 2025-02-05 ASSESSMENT — PAIN SCALES - GENERAL: PAINLEVEL_OUTOF10: 9

## 2025-02-05 ASSESSMENT — PAIN DESCRIPTION - LOCATION: LOCATION: FLANK

## 2025-02-05 ASSESSMENT — PAIN - FUNCTIONAL ASSESSMENT: PAIN_FUNCTIONAL_ASSESSMENT: 0-10

## 2025-02-06 ENCOUNTER — APPOINTMENT (OUTPATIENT)
Dept: CT IMAGING | Age: 70
DRG: 907 | End: 2025-02-06
Payer: MEDICARE

## 2025-02-06 VITALS
DIASTOLIC BLOOD PRESSURE: 68 MMHG | BODY MASS INDEX: 29.53 KG/M2 | HEIGHT: 64 IN | HEART RATE: 64 BPM | SYSTOLIC BLOOD PRESSURE: 120 MMHG | RESPIRATION RATE: 14 BRPM | TEMPERATURE: 98.6 F | OXYGEN SATURATION: 96 % | WEIGHT: 173 LBS

## 2025-02-06 LAB
ALBUMIN SERPL BCG-MCNC: 3.5 G/DL (ref 3.5–5.1)
ALP SERPL-CCNC: 58 U/L (ref 38–126)
ALT SERPL W/O P-5'-P-CCNC: 31 U/L (ref 11–66)
ANION GAP SERPL CALC-SCNC: 10 MEQ/L (ref 8–16)
AST SERPL-CCNC: 25 U/L (ref 5–40)
BASOPHILS ABSOLUTE: 0.1 THOU/MM3 (ref 0–0.1)
BASOPHILS NFR BLD AUTO: 0.8 %
BILIRUB SERPL-MCNC: 0.2 MG/DL (ref 0.3–1.2)
BUN SERPL-MCNC: 24 MG/DL (ref 7–22)
CALCIUM SERPL-MCNC: 10.1 MG/DL (ref 8.5–10.5)
CHLORIDE SERPL-SCNC: 96 MEQ/L (ref 98–111)
CO2 SERPL-SCNC: 23 MEQ/L (ref 23–33)
CREAT SERPL-MCNC: 0.9 MG/DL (ref 0.4–1.2)
DEPRECATED RDW RBC AUTO: 49.1 FL (ref 35–45)
EOSINOPHIL NFR BLD AUTO: 3 %
EOSINOPHILS ABSOLUTE: 0.2 THOU/MM3 (ref 0–0.4)
ERYTHROCYTE [DISTWIDTH] IN BLOOD BY AUTOMATED COUNT: 16.6 % (ref 11.5–14.5)
FLUAV RNA RESP QL NAA+PROBE: NOT DETECTED
FLUBV RNA RESP QL NAA+PROBE: NOT DETECTED
GFR SERPL CREATININE-BSD FRML MDRD: 70 ML/MIN/1.73M2
GLUCOSE SERPL-MCNC: 246 MG/DL (ref 70–108)
HCT VFR BLD AUTO: 36.9 % (ref 37–47)
HGB BLD-MCNC: 11.2 GM/DL (ref 12–16)
IMM GRANULOCYTES # BLD AUTO: 0.11 THOU/MM3 (ref 0–0.07)
IMM GRANULOCYTES NFR BLD AUTO: 1.4 %
LACTIC ACID, SEPSIS: 1.8 MMOL/L (ref 0.5–1.9)
LYMPHOCYTES ABSOLUTE: 2.9 THOU/MM3 (ref 1–4.8)
LYMPHOCYTES NFR BLD AUTO: 37.8 %
MCH RBC QN AUTO: 25.3 PG (ref 26–33)
MCHC RBC AUTO-ENTMCNC: 30.4 GM/DL (ref 32.2–35.5)
MCV RBC AUTO: 83.5 FL (ref 81–99)
MONOCYTES ABSOLUTE: 0.6 THOU/MM3 (ref 0.4–1.3)
MONOCYTES NFR BLD AUTO: 7.7 %
NEUTROPHILS ABSOLUTE: 3.8 THOU/MM3 (ref 1.8–7.7)
NEUTROPHILS NFR BLD AUTO: 49.3 %
NRBC BLD AUTO-RTO: 0 /100 WBC
OSMOLALITY SERPL CALC.SUM OF ELEC: 271.2 MOSMOL/KG (ref 275–300)
PLATELET # BLD AUTO: 252 THOU/MM3 (ref 130–400)
PMV BLD AUTO: 9.6 FL (ref 9.4–12.4)
POTASSIUM SERPL-SCNC: 4.2 MEQ/L (ref 3.5–5.2)
PROT SERPL-MCNC: 6.7 G/DL (ref 6.1–8)
RBC # BLD AUTO: 4.42 MILL/MM3 (ref 4.2–5.4)
SARS-COV-2 RNA RESP QL NAA+PROBE: DETECTED
SODIUM SERPL-SCNC: 129 MEQ/L (ref 135–145)
WBC # BLD AUTO: 7.7 THOU/MM3 (ref 4.8–10.8)

## 2025-02-06 PROCEDURE — 6360000002 HC RX W HCPCS: Performed by: EMERGENCY MEDICINE

## 2025-02-06 PROCEDURE — 96375 TX/PRO/DX INJ NEW DRUG ADDON: CPT

## 2025-02-06 PROCEDURE — 83605 ASSAY OF LACTIC ACID: CPT

## 2025-02-06 PROCEDURE — 6360000004 HC RX CONTRAST MEDICATION: Performed by: EMERGENCY MEDICINE

## 2025-02-06 PROCEDURE — 96374 THER/PROPH/DIAG INJ IV PUSH: CPT

## 2025-02-06 PROCEDURE — 74177 CT ABD & PELVIS W/CONTRAST: CPT

## 2025-02-06 PROCEDURE — 87040 BLOOD CULTURE FOR BACTERIA: CPT

## 2025-02-06 RX ORDER — ONDANSETRON 2 MG/ML
4 INJECTION INTRAMUSCULAR; INTRAVENOUS ONCE
Status: COMPLETED | OUTPATIENT
Start: 2025-02-06 | End: 2025-02-06

## 2025-02-06 RX ORDER — OXYCODONE AND ACETAMINOPHEN 5; 325 MG/1; MG/1
1 TABLET ORAL EVERY 6 HOURS PRN
Qty: 20 TABLET | Refills: 0 | Status: ON HOLD | OUTPATIENT
Start: 2025-02-06 | End: 2025-02-18

## 2025-02-06 RX ORDER — IOPAMIDOL 755 MG/ML
80 INJECTION, SOLUTION INTRAVASCULAR
Status: COMPLETED | OUTPATIENT
Start: 2025-02-06 | End: 2025-02-06

## 2025-02-06 RX ORDER — DIPHENHYDRAMINE HYDROCHLORIDE 50 MG/ML
25 INJECTION INTRAMUSCULAR; INTRAVENOUS ONCE
Status: COMPLETED | OUTPATIENT
Start: 2025-02-06 | End: 2025-02-06

## 2025-02-06 RX ADMIN — IOPAMIDOL 80 ML: 755 INJECTION, SOLUTION INTRAVENOUS at 01:21

## 2025-02-06 RX ADMIN — ONDANSETRON 4 MG: 2 INJECTION INTRAMUSCULAR; INTRAVENOUS at 01:10

## 2025-02-06 RX ADMIN — DIPHENHYDRAMINE HYDROCHLORIDE 25 MG: 50 INJECTION INTRAMUSCULAR; INTRAVENOUS at 01:18

## 2025-02-06 RX ADMIN — HYDROMORPHONE HYDROCHLORIDE 1 MG: 1 INJECTION, SOLUTION INTRAMUSCULAR; INTRAVENOUS; SUBCUTANEOUS at 01:09

## 2025-02-06 ASSESSMENT — ENCOUNTER SYMPTOMS
VOMITING: 0
ABDOMINAL PAIN: 0
SORE THROAT: 0
BACK PAIN: 0
COUGH: 0
SHORTNESS OF BREATH: 0
DIARRHEA: 0

## 2025-02-06 NOTE — ED PROVIDER NOTES
Rfl: 0    fluticasone (FLONASE) 50 MCG/ACT nasal spray, 1 spray by Each Nostril route 2 times daily, Disp: 1 Bottle, Rfl: 0    folic acid (FOLVITE) 1 MG tablet, Take 1 tablet by mouth daily, Disp: , Rfl:     atorvastatin (LIPITOR) 40 MG tablet, Take 1 tablet by mouth nightly, Disp: , Rfl:     escitalopram (LEXAPRO) 20 MG tablet, Take 1 tablet by mouth daily, Disp: , Rfl:     fenofibrate 160 MG tablet, Take 1 tablet by mouth daily, Disp: , Rfl:     traZODone (DESYREL) 100 MG tablet, Take 2 tablets by mouth nightly, Disp: 30 tablet, Rfl: 0      SOCIAL HISTORY     Social History     Social History Narrative    Not on file     Social History     Tobacco Use    Smoking status: Every Day     Current packs/day: 1.00     Average packs/day: 1 pack/day for 47.8 years (47.8 ttl pk-yrs)     Types: Cigarettes     Start date: 4/22/1977    Smokeless tobacco: Never    Tobacco comments:     Trying to quit   Vaping Use    Vaping status: Never Used   Substance Use Topics    Alcohol use: No     Comment: none for 2 years    Drug use: Not Currently     Frequency: 1.0 times per week     Types: Cocaine         ALLERGIES   No Known Allergies      FAMILY HISTORY     Family History   Problem Relation Age of Onset    Cancer Mother     Heart Disease Mother     High Blood Pressure Mother     High Cholesterol Mother     Cancer Father         brain    Depression Father     Heart Disease Father     High Cholesterol Father     Mental Illness Father     Cancer Sister     Heart Attack Sister     Heart Disease Maternal Uncle     High Cholesterol Maternal Uncle     Diabetes Maternal Grandmother     Heart Disease Maternal Grandmother     High Cholesterol Maternal Grandmother     Early Death Maternal Grandfather     Heart Disease Maternal Grandfather     High Cholesterol Maternal Grandfather     Stroke Maternal Grandfather     Heart Disease Paternal Grandmother     High Cholesterol Paternal Grandmother     Heart Disease Paternal Grandfather     High  advised her to follow up with her urologist.  Patient verbalized understanding.   [ER]      ED Course User Index  [ER] Sharri Jauregui MD     Available laboratory and imaging results were independently reviewed and clinically correlated.    MIPS: NA    Decision Rules/Clinical Scores utilized:   NA .    Code Status:  Full code    [facility] Social determinants of health considered to potentially effect treatment and/or disposition plan:  Older age and Health literacy  Healthy People 2030, U.[place] Department of Health and Human Services, Office of Disease Prevention and Health Promotion.     Medical Comorbidities impacting treatment or disposition:  nephrectomy with post operative abscess   Past Medical History:   Diagnosis Date    Acute cystitis with hematuria 01/19/2024    Acute encephalopathy 01/10/2024    Alcohol dependence in remission (HCC)     Allergic rhinitis     Arthritis     back, arms, hips    Bacteriuria 10/13/2024    Bipolar 1 disorder (HCC)     Cancer (HCC) 2011    cancerous polyps removed - Dr. Silva    Cannabis abuse     Cataract     Change of, skin texture 04/21/2014    Cocaine abuse (HCC)     Colon polyps     COPD (chronic obstructive pulmonary disease) (HCC) 07/24/2014    Coronary vasospasm ([facility]) 08/17/2019    Depression     Disorder associated with Helicobacter species 04/27/2012    Diverticulitis of colon     Endometriosis 12/11/2019    Erosive esophagitis 07/06/2015    Erosive gastritis 10/10/2015    GERD (gastroesophageal reflux disease)     Glaucoma     H pylori ulcer 10/10/2015    Hearing loss     Hiatal hernia     History of arterial ischemic stroke 07/20/2019    History of colonoscopy 2002    History of DVT (deep vein thrombosis)     History of Helicobacter pylori infection     History of kidney stones     History of pulmonary embolism 07/24/2014    Hx of blood clots 06/17/2014    PE and collar bone area after shoulder surgery    Hyperlipidemia     Hypertension     Hypomagnesemia 10/13/2024

## 2025-02-06 NOTE — ED NOTES
Pt medicated per MAR. Blood work collected. Redness and itching noticed on left forearm after medication administration. Pt noting itching. Provider notified and pt medicated per MAR. Respirations even and unlabored. Call light within reach. Will monitor. CT at bedside to transport pt.

## 2025-02-06 NOTE — ED NOTES
Pt reports to the ED from the [facility] due to nephrostomy tube pain. Pt states had left kidney removed and has had a drain placed from it. Pt was getting out of bed when pt felt tube pull and has had pain since. Pt is unsure of what time this occurred. Pt took a norco at 2100. Pt had COVID and stated was just out of quarantine today. INT established. Cardiac monitoring in place.

## 2025-02-06 NOTE — ED NOTES
Pt resting on cot and updated on plan of care to be discharged. Respirations even and unlabored. Call light within reach. Will monitor.

## 2025-02-06 NOTE — ED NOTES
Pt ambulated to restroom at this time with this RN. Pt unable to collect urine sample at this time. Respirations even and unlabored. Call light within reach. Will monitor.

## 2025-02-06 NOTE — DISCHARGE INSTRUCTIONS
Emergency rooms cannot replace ongoing medical care.   We suggest you call your PCP for follow up within one week.   Return to the ER for increasing pain, fever > 101.5, recurrent vomiting, or any concern you deem emergent.      Thank you for choosing [facility] for your medical care!  We know you have a choice for your health care needs, and we are grateful you chose us.

## 2025-02-06 NOTE — ED NOTES
Pt returned from CT at this time. Pt itching has resolved. Some red spots noted on left forearm. Respirations even and unlabored. Call light within reach. No further needs voiced. Will monitor.

## 2025-02-07 ENCOUNTER — APPOINTMENT (OUTPATIENT)
Dept: CT IMAGING | Age: 70
DRG: 907 | End: 2025-02-07
Payer: MEDICARE

## 2025-02-07 ENCOUNTER — HOSPITAL ENCOUNTER (INPATIENT)
Age: 70
LOS: 10 days | Discharge: NURSING, REHAB OR LONG-TERM CARE | DRG: 907 | End: 2025-02-18
Attending: STUDENT IN AN ORGANIZED HEALTH CARE EDUCATION/TRAINING PROGRAM | Admitting: STUDENT IN AN ORGANIZED HEALTH CARE EDUCATION/TRAINING PROGRAM
Payer: MEDICARE

## 2025-02-07 DIAGNOSIS — R10.9 FLANK PAIN: ICD-10-CM

## 2025-02-07 DIAGNOSIS — L02.219 CELLULITIS AND ABSCESS OF TRUNK: Primary | ICD-10-CM

## 2025-02-07 DIAGNOSIS — L03.319 CELLULITIS AND ABSCESS OF TRUNK: Primary | ICD-10-CM

## 2025-02-07 DIAGNOSIS — Z93.6 NEPHROSTOMY PRESENT (HCC): ICD-10-CM

## 2025-02-07 LAB
ALBUMIN SERPL BCG-MCNC: 3.4 G/DL (ref 3.5–5.1)
ALP SERPL-CCNC: 58 U/L (ref 38–126)
ALT SERPL W/O P-5'-P-CCNC: 27 U/L (ref 11–66)
ANION GAP SERPL CALC-SCNC: 10 MEQ/L (ref 8–16)
AST SERPL-CCNC: 19 U/L (ref 5–40)
BASOPHILS ABSOLUTE: 0 THOU/MM3 (ref 0–0.1)
BASOPHILS NFR BLD AUTO: 0.2 %
BILIRUB CONJ SERPL-MCNC: 0.3 MG/DL (ref 0.1–13.8)
BILIRUB SERPL-MCNC: 0.6 MG/DL (ref 0.3–1.2)
BUN SERPL-MCNC: 19 MG/DL (ref 7–22)
CALCIUM SERPL-MCNC: 9.8 MG/DL (ref 8.5–10.5)
CHLORIDE SERPL-SCNC: 96 MEQ/L (ref 98–111)
CO2 SERPL-SCNC: 23 MEQ/L (ref 23–33)
CREAT SERPL-MCNC: 0.9 MG/DL (ref 0.4–1.2)
DEPRECATED RDW RBC AUTO: 49.4 FL (ref 35–45)
EOSINOPHIL NFR BLD AUTO: 1.8 %
EOSINOPHILS ABSOLUTE: 0.2 THOU/MM3 (ref 0–0.4)
ERYTHROCYTE [DISTWIDTH] IN BLOOD BY AUTOMATED COUNT: 16.7 % (ref 11.5–14.5)
GFR SERPL CREATININE-BSD FRML MDRD: 70 ML/MIN/1.73M2
GLUCOSE SERPL-MCNC: 257 MG/DL (ref 70–108)
HCT VFR BLD AUTO: 30.9 % (ref 37–47)
HGB BLD-MCNC: 9.7 GM/DL (ref 12–16)
IMM GRANULOCYTES # BLD AUTO: 0.07 THOU/MM3 (ref 0–0.07)
IMM GRANULOCYTES NFR BLD AUTO: 0.8 %
LACTATE SERPL-SCNC: 1.8 MMOL/L (ref 0.5–2)
LYMPHOCYTES ABSOLUTE: 1.1 THOU/MM3 (ref 1–4.8)
LYMPHOCYTES NFR BLD AUTO: 12.1 %
MCH RBC QN AUTO: 25.9 PG (ref 26–33)
MCHC RBC AUTO-ENTMCNC: 31.4 GM/DL (ref 32.2–35.5)
MCV RBC AUTO: 82.4 FL (ref 81–99)
MONOCYTES ABSOLUTE: 1 THOU/MM3 (ref 0.4–1.3)
MONOCYTES NFR BLD AUTO: 10.7 %
NEUTROPHILS ABSOLUTE: 6.9 THOU/MM3 (ref 1.8–7.7)
NEUTROPHILS NFR BLD AUTO: 74.4 %
NRBC BLD AUTO-RTO: 0 /100 WBC
OSMOLALITY SERPL CALC.SUM OF ELEC: 270 MOSMOL/KG (ref 275–300)
PLATELET # BLD AUTO: 180 THOU/MM3 (ref 130–400)
PMV BLD AUTO: 9.3 FL (ref 9.4–12.4)
POTASSIUM SERPL-SCNC: 4.2 MEQ/L (ref 3.5–5.2)
PROT SERPL-MCNC: 6.4 G/DL (ref 6.1–8)
RBC # BLD AUTO: 3.75 MILL/MM3 (ref 4.2–5.4)
SODIUM SERPL-SCNC: 129 MEQ/L (ref 135–145)
WBC # BLD AUTO: 9.3 THOU/MM3 (ref 4.8–10.8)

## 2025-02-07 PROCEDURE — 86140 C-REACTIVE PROTEIN: CPT

## 2025-02-07 PROCEDURE — 84145 PROCALCITONIN (PCT): CPT

## 2025-02-07 PROCEDURE — 83605 ASSAY OF LACTIC ACID: CPT

## 2025-02-07 PROCEDURE — 99285 EMERGENCY DEPT VISIT HI MDM: CPT

## 2025-02-07 PROCEDURE — 36415 COLL VENOUS BLD VENIPUNCTURE: CPT

## 2025-02-07 PROCEDURE — 74177 CT ABD & PELVIS W/CONTRAST: CPT

## 2025-02-07 PROCEDURE — 82248 BILIRUBIN DIRECT: CPT

## 2025-02-07 PROCEDURE — 85025 COMPLETE CBC W/AUTO DIFF WBC: CPT

## 2025-02-07 PROCEDURE — 80053 COMPREHEN METABOLIC PANEL: CPT

## 2025-02-07 PROCEDURE — 6360000004 HC RX CONTRAST MEDICATION: Performed by: STUDENT IN AN ORGANIZED HEALTH CARE EDUCATION/TRAINING PROGRAM

## 2025-02-07 PROCEDURE — 87040 BLOOD CULTURE FOR BACTERIA: CPT

## 2025-02-07 RX ORDER — IOPAMIDOL 755 MG/ML
80 INJECTION, SOLUTION INTRAVASCULAR
Status: COMPLETED | OUTPATIENT
Start: 2025-02-07 | End: 2025-02-07

## 2025-02-07 RX ADMIN — IOPAMIDOL 80 ML: 755 INJECTION, SOLUTION INTRAVENOUS at 23:39

## 2025-02-07 ASSESSMENT — PAIN SCALES - GENERAL
PAINLEVEL_OUTOF10: 10
PAINLEVEL_OUTOF10: 10

## 2025-02-07 ASSESSMENT — PAIN - FUNCTIONAL ASSESSMENT
PAIN_FUNCTIONAL_ASSESSMENT: 0-10
PAIN_FUNCTIONAL_ASSESSMENT: 0-10

## 2025-02-08 PROBLEM — L03.90 CELLULITIS: Status: ACTIVE | Noted: 2025-02-08

## 2025-02-08 LAB
BACTERIA BLD AEROBE CULT: NORMAL
BACTERIA BLD AEROBE CULT: NORMAL
BACTERIA URNS QL MICRO: ABNORMAL /HPF
BILIRUB UR QL STRIP.AUTO: NEGATIVE
CASTS #/AREA URNS LPF: ABNORMAL /LPF
CASTS 2: ABNORMAL /LPF
CHARACTER UR: CLEAR
COLOR, UA: YELLOW
CRP SERPL-MCNC: 26.04 MG/DL (ref 0–1)
CRYSTALS URNS MICRO: ABNORMAL
EPITHELIAL CELLS, UA: ABNORMAL /HPF
GLUCOSE BLD STRIP.AUTO-MCNC: 131 MG/DL (ref 70–108)
GLUCOSE BLD STRIP.AUTO-MCNC: 137 MG/DL (ref 70–108)
GLUCOSE BLD STRIP.AUTO-MCNC: 97 MG/DL (ref 70–108)
GLUCOSE UR QL STRIP.AUTO: NEGATIVE MG/DL
HGB UR QL STRIP.AUTO: ABNORMAL
KETONES UR QL STRIP.AUTO: NEGATIVE
MISCELLANEOUS 2: ABNORMAL
NITRITE UR QL STRIP: NEGATIVE
PH UR STRIP.AUTO: 6 [PH] (ref 5–9)
PROCALCITONIN SERPL IA-MCNC: 0.19 NG/ML (ref 0.01–0.09)
PROT UR STRIP.AUTO-MCNC: NEGATIVE MG/DL
RBC URINE: ABNORMAL /HPF
RENAL EPI CELLS #/AREA URNS HPF: ABNORMAL /[HPF]
SP GR UR REFRACT.AUTO: > 1.03 (ref 1–1.03)
UROBILINOGEN, URINE: 1 EU/DL (ref 0–1)
WBC #/AREA URNS HPF: ABNORMAL /HPF
WBC #/AREA URNS HPF: ABNORMAL /[HPF]
YEAST LIKE FUNGI URNS QL MICRO: ABNORMAL

## 2025-02-08 PROCEDURE — 99223 1ST HOSP IP/OBS HIGH 75: CPT

## 2025-02-08 PROCEDURE — 36415 COLL VENOUS BLD VENIPUNCTURE: CPT

## 2025-02-08 PROCEDURE — 6370000000 HC RX 637 (ALT 250 FOR IP)

## 2025-02-08 PROCEDURE — 2580000003 HC RX 258

## 2025-02-08 PROCEDURE — 87205 SMEAR GRAM STAIN: CPT

## 2025-02-08 PROCEDURE — 93005 ELECTROCARDIOGRAM TRACING: CPT

## 2025-02-08 PROCEDURE — 81001 URINALYSIS AUTO W/SCOPE: CPT

## 2025-02-08 PROCEDURE — 96375 TX/PRO/DX INJ NEW DRUG ADDON: CPT

## 2025-02-08 PROCEDURE — 2500000003 HC RX 250 WO HCPCS

## 2025-02-08 PROCEDURE — 87086 URINE CULTURE/COLONY COUNT: CPT

## 2025-02-08 PROCEDURE — 87077 CULTURE AEROBIC IDENTIFY: CPT

## 2025-02-08 PROCEDURE — 6360000002 HC RX W HCPCS

## 2025-02-08 PROCEDURE — 2580000003 HC RX 258: Performed by: STUDENT IN AN ORGANIZED HEALTH CARE EDUCATION/TRAINING PROGRAM

## 2025-02-08 PROCEDURE — 87040 BLOOD CULTURE FOR BACTERIA: CPT

## 2025-02-08 PROCEDURE — 87147 CULTURE TYPE IMMUNOLOGIC: CPT

## 2025-02-08 PROCEDURE — 87075 CULTR BACTERIA EXCEPT BLOOD: CPT

## 2025-02-08 PROCEDURE — 6360000002 HC RX W HCPCS: Performed by: STUDENT IN AN ORGANIZED HEALTH CARE EDUCATION/TRAINING PROGRAM

## 2025-02-08 PROCEDURE — 87070 CULTURE OTHR SPECIMN AEROBIC: CPT

## 2025-02-08 PROCEDURE — 96374 THER/PROPH/DIAG INJ IV PUSH: CPT

## 2025-02-08 PROCEDURE — 1200000003 HC TELEMETRY R&B

## 2025-02-08 PROCEDURE — 82948 REAGENT STRIP/BLOOD GLUCOSE: CPT

## 2025-02-08 PROCEDURE — 87186 SC STD MICRODIL/AGAR DIL: CPT

## 2025-02-08 RX ORDER — MORPHINE SULFATE 4 MG/ML
4 INJECTION, SOLUTION INTRAMUSCULAR; INTRAVENOUS
Status: COMPLETED | OUTPATIENT
Start: 2025-02-08 | End: 2025-02-08

## 2025-02-08 RX ORDER — ROPINIROLE 0.5 MG/1
0.5 TABLET, FILM COATED ORAL NIGHTLY
Status: DISCONTINUED | OUTPATIENT
Start: 2025-02-08 | End: 2025-02-18 | Stop reason: HOSPADM

## 2025-02-08 RX ORDER — 0.9 % SODIUM CHLORIDE 0.9 %
1000 INTRAVENOUS SOLUTION INTRAVENOUS ONCE
Status: COMPLETED | OUTPATIENT
Start: 2025-02-08 | End: 2025-02-08

## 2025-02-08 RX ORDER — MAGNESIUM SULFATE IN WATER 40 MG/ML
2000 INJECTION, SOLUTION INTRAVENOUS PRN
Status: DISCONTINUED | OUTPATIENT
Start: 2025-02-08 | End: 2025-02-18 | Stop reason: HOSPADM

## 2025-02-08 RX ORDER — ESCITALOPRAM OXALATE 20 MG/1
20 TABLET ORAL DAILY
Status: DISCONTINUED | OUTPATIENT
Start: 2025-02-08 | End: 2025-02-18 | Stop reason: HOSPADM

## 2025-02-08 RX ORDER — ATORVASTATIN CALCIUM 40 MG/1
40 TABLET, FILM COATED ORAL NIGHTLY
Status: DISCONTINUED | OUTPATIENT
Start: 2025-02-08 | End: 2025-02-18 | Stop reason: HOSPADM

## 2025-02-08 RX ORDER — ACETAMINOPHEN 325 MG/1
650 TABLET ORAL EVERY 6 HOURS PRN
Status: DISCONTINUED | OUTPATIENT
Start: 2025-02-08 | End: 2025-02-18 | Stop reason: HOSPADM

## 2025-02-08 RX ORDER — OXYCODONE AND ACETAMINOPHEN 5; 325 MG/1; MG/1
1 TABLET ORAL EVERY 6 HOURS PRN
Status: DISCONTINUED | OUTPATIENT
Start: 2025-02-08 | End: 2025-02-18 | Stop reason: HOSPADM

## 2025-02-08 RX ORDER — FLUTICASONE PROPIONATE 50 MCG
1 SPRAY, SUSPENSION (ML) NASAL 2 TIMES DAILY PRN
Status: DISCONTINUED | OUTPATIENT
Start: 2025-02-08 | End: 2025-02-18 | Stop reason: HOSPADM

## 2025-02-08 RX ORDER — ALBUTEROL SULFATE 0.83 MG/ML
2.5 SOLUTION RESPIRATORY (INHALATION) EVERY 6 HOURS PRN
Status: DISCONTINUED | OUTPATIENT
Start: 2025-02-08 | End: 2025-02-12

## 2025-02-08 RX ORDER — GLUCAGON 1 MG/ML
1 KIT INJECTION PRN
Status: DISCONTINUED | OUTPATIENT
Start: 2025-02-08 | End: 2025-02-18 | Stop reason: HOSPADM

## 2025-02-08 RX ORDER — FOLIC ACID 1 MG/1
1 TABLET ORAL DAILY
Status: DISCONTINUED | OUTPATIENT
Start: 2025-02-08 | End: 2025-02-12

## 2025-02-08 RX ORDER — ENOXAPARIN SODIUM 100 MG/ML
40 INJECTION SUBCUTANEOUS DAILY
Status: DISCONTINUED | OUTPATIENT
Start: 2025-02-08 | End: 2025-02-18 | Stop reason: HOSPADM

## 2025-02-08 RX ORDER — QUETIAPINE FUMARATE 100 MG/1
200 TABLET, FILM COATED ORAL NIGHTLY
Status: DISCONTINUED | OUTPATIENT
Start: 2025-02-08 | End: 2025-02-11

## 2025-02-08 RX ORDER — ONDANSETRON 2 MG/ML
4 INJECTION INTRAMUSCULAR; INTRAVENOUS EVERY 6 HOURS PRN
Status: DISCONTINUED | OUTPATIENT
Start: 2025-02-08 | End: 2025-02-18 | Stop reason: HOSPADM

## 2025-02-08 RX ORDER — TRAZODONE HYDROCHLORIDE 100 MG/1
200 TABLET ORAL NIGHTLY
Status: DISCONTINUED | OUTPATIENT
Start: 2025-02-08 | End: 2025-02-18 | Stop reason: HOSPADM

## 2025-02-08 RX ORDER — CARVEDILOL 3.12 MG/1
3.12 TABLET ORAL 2 TIMES DAILY
Status: DISCONTINUED | OUTPATIENT
Start: 2025-02-08 | End: 2025-02-18 | Stop reason: HOSPADM

## 2025-02-08 RX ORDER — DEXTROSE MONOHYDRATE 100 MG/ML
INJECTION, SOLUTION INTRAVENOUS CONTINUOUS PRN
Status: DISCONTINUED | OUTPATIENT
Start: 2025-02-08 | End: 2025-02-18 | Stop reason: HOSPADM

## 2025-02-08 RX ORDER — POLYETHYLENE GLYCOL 3350 17 G/17G
17 POWDER, FOR SOLUTION ORAL DAILY PRN
Status: DISCONTINUED | OUTPATIENT
Start: 2025-02-08 | End: 2025-02-18 | Stop reason: HOSPADM

## 2025-02-08 RX ORDER — INSULIN LISPRO 100 [IU]/ML
0-4 INJECTION, SOLUTION INTRAVENOUS; SUBCUTANEOUS
Status: DISCONTINUED | OUTPATIENT
Start: 2025-02-08 | End: 2025-02-18 | Stop reason: HOSPADM

## 2025-02-08 RX ORDER — ACETAMINOPHEN 650 MG/1
650 SUPPOSITORY RECTAL EVERY 6 HOURS PRN
Status: DISCONTINUED | OUTPATIENT
Start: 2025-02-08 | End: 2025-02-18 | Stop reason: HOSPADM

## 2025-02-08 RX ORDER — FENOFIBRATE 160 MG/1
160 TABLET ORAL DAILY
Status: DISCONTINUED | OUTPATIENT
Start: 2025-02-08 | End: 2025-02-18 | Stop reason: HOSPADM

## 2025-02-08 RX ORDER — POTASSIUM CHLORIDE 7.45 MG/ML
10 INJECTION INTRAVENOUS PRN
Status: DISCONTINUED | OUTPATIENT
Start: 2025-02-08 | End: 2025-02-18 | Stop reason: HOSPADM

## 2025-02-08 RX ORDER — POTASSIUM CHLORIDE 1500 MG/1
40 TABLET, EXTENDED RELEASE ORAL PRN
Status: DISCONTINUED | OUTPATIENT
Start: 2025-02-08 | End: 2025-02-18 | Stop reason: HOSPADM

## 2025-02-08 RX ORDER — SODIUM CHLORIDE 0.9 % (FLUSH) 0.9 %
5-40 SYRINGE (ML) INJECTION EVERY 12 HOURS SCHEDULED
Status: DISCONTINUED | OUTPATIENT
Start: 2025-02-08 | End: 2025-02-18 | Stop reason: HOSPADM

## 2025-02-08 RX ORDER — PANTOPRAZOLE SODIUM 40 MG/1
40 TABLET, DELAYED RELEASE ORAL
Status: DISCONTINUED | OUTPATIENT
Start: 2025-02-08 | End: 2025-02-18 | Stop reason: HOSPADM

## 2025-02-08 RX ORDER — LEVOTHYROXINE SODIUM 75 UG/1
75 TABLET ORAL
Status: DISCONTINUED | OUTPATIENT
Start: 2025-02-08 | End: 2025-02-11

## 2025-02-08 RX ORDER — INSULIN GLARGINE 100 [IU]/ML
14 INJECTION, SOLUTION SUBCUTANEOUS NIGHTLY
Status: DISCONTINUED | OUTPATIENT
Start: 2025-02-08 | End: 2025-02-09

## 2025-02-08 RX ORDER — ONDANSETRON 4 MG/1
4 TABLET, ORALLY DISINTEGRATING ORAL EVERY 8 HOURS PRN
Status: DISCONTINUED | OUTPATIENT
Start: 2025-02-08 | End: 2025-02-18 | Stop reason: HOSPADM

## 2025-02-08 RX ORDER — ONDANSETRON 2 MG/ML
4 INJECTION INTRAMUSCULAR; INTRAVENOUS ONCE
Status: COMPLETED | OUTPATIENT
Start: 2025-02-08 | End: 2025-02-08

## 2025-02-08 RX ORDER — BUPROPION HYDROCHLORIDE 300 MG/1
300 TABLET ORAL EVERY MORNING
Status: DISCONTINUED | OUTPATIENT
Start: 2025-02-08 | End: 2025-02-18 | Stop reason: HOSPADM

## 2025-02-08 RX ORDER — SODIUM CHLORIDE 9 MG/ML
INJECTION, SOLUTION INTRAVENOUS PRN
Status: DISCONTINUED | OUTPATIENT
Start: 2025-02-08 | End: 2025-02-18 | Stop reason: HOSPADM

## 2025-02-08 RX ORDER — SODIUM CHLORIDE 0.9 % (FLUSH) 0.9 %
5-40 SYRINGE (ML) INJECTION PRN
Status: DISCONTINUED | OUTPATIENT
Start: 2025-02-08 | End: 2025-02-18 | Stop reason: HOSPADM

## 2025-02-08 RX ADMIN — ROPINIROLE HYDROCHLORIDE 0.5 MG: 0.5 TABLET, FILM COATED ORAL at 06:13

## 2025-02-08 RX ADMIN — ESCITALOPRAM OXALATE 20 MG: 20 TABLET ORAL at 11:02

## 2025-02-08 RX ADMIN — ENOXAPARIN SODIUM 40 MG: 100 INJECTION SUBCUTANEOUS at 11:02

## 2025-02-08 RX ADMIN — ATORVASTATIN CALCIUM 40 MG: 40 TABLET, FILM COATED ORAL at 20:56

## 2025-02-08 RX ADMIN — PANTOPRAZOLE SODIUM 40 MG: 40 TABLET, DELAYED RELEASE ORAL at 15:04

## 2025-02-08 RX ADMIN — SODIUM CHLORIDE, PRESERVATIVE FREE 10 ML: 5 INJECTION INTRAVENOUS at 22:12

## 2025-02-08 RX ADMIN — OXYCODONE HYDROCHLORIDE AND ACETAMINOPHEN 1 TABLET: 5; 325 TABLET ORAL at 05:48

## 2025-02-08 RX ADMIN — SODIUM CHLORIDE 1000 ML: 9 INJECTION, SOLUTION INTRAVENOUS at 00:55

## 2025-02-08 RX ADMIN — QUETIAPINE FUMARATE 200 MG: 100 TABLET ORAL at 22:12

## 2025-02-08 RX ADMIN — ROPINIROLE HYDROCHLORIDE 0.5 MG: 0.5 TABLET, FILM COATED ORAL at 22:12

## 2025-02-08 RX ADMIN — CARVEDILOL 3.12 MG: 3.12 TABLET, FILM COATED ORAL at 22:12

## 2025-02-08 RX ADMIN — INSULIN GLARGINE 14 UNITS: 100 INJECTION, SOLUTION SUBCUTANEOUS at 20:57

## 2025-02-08 RX ADMIN — BUPROPION HYDROCHLORIDE 300 MG: 300 TABLET, EXTENDED RELEASE ORAL at 11:02

## 2025-02-08 RX ADMIN — OXYCODONE HYDROCHLORIDE AND ACETAMINOPHEN 1 TABLET: 5; 325 TABLET ORAL at 20:56

## 2025-02-08 RX ADMIN — OXYCODONE HYDROCHLORIDE AND ACETAMINOPHEN 1 TABLET: 5; 325 TABLET ORAL at 15:03

## 2025-02-08 RX ADMIN — ONDANSETRON 4 MG: 2 INJECTION, SOLUTION INTRAMUSCULAR; INTRAVENOUS at 00:46

## 2025-02-08 RX ADMIN — FOLIC ACID 1 MG: 1 TABLET ORAL at 11:02

## 2025-02-08 RX ADMIN — OXYCODONE HYDROCHLORIDE AND ACETAMINOPHEN 1 TABLET: 5; 325 TABLET ORAL at 11:56

## 2025-02-08 RX ADMIN — VANCOMYCIN HYDROCHLORIDE 2000 MG: 5 INJECTION, POWDER, LYOPHILIZED, FOR SOLUTION INTRAVENOUS at 01:18

## 2025-02-08 RX ADMIN — CARVEDILOL 3.12 MG: 3.12 TABLET, FILM COATED ORAL at 11:02

## 2025-02-08 RX ADMIN — TRAZODONE HYDROCHLORIDE 200 MG: 100 TABLET ORAL at 20:56

## 2025-02-08 RX ADMIN — WATER 1000 MG: 1 INJECTION INTRAMUSCULAR; INTRAVENOUS; SUBCUTANEOUS at 00:51

## 2025-02-08 RX ADMIN — FENOFIBRATE 160 MG: 160 TABLET ORAL at 11:02

## 2025-02-08 RX ADMIN — MORPHINE SULFATE 4 MG: 4 INJECTION, SOLUTION INTRAMUSCULAR; INTRAVENOUS at 00:44

## 2025-02-08 ASSESSMENT — PAIN DESCRIPTION - LOCATION
LOCATION: ABDOMEN
LOCATION: FLANK

## 2025-02-08 ASSESSMENT — PAIN - FUNCTIONAL ASSESSMENT
PAIN_FUNCTIONAL_ASSESSMENT: 0-10
PAIN_FUNCTIONAL_ASSESSMENT: 0-10
PAIN_FUNCTIONAL_ASSESSMENT: PREVENTS OR INTERFERES SOME ACTIVE ACTIVITIES AND ADLS
PAIN_FUNCTIONAL_ASSESSMENT: 0-10

## 2025-02-08 ASSESSMENT — PAIN SCALES - GENERAL
PAINLEVEL_OUTOF10: 9
PAINLEVEL_OUTOF10: 10
PAINLEVEL_OUTOF10: 4
PAINLEVEL_OUTOF10: 9
PAINLEVEL_OUTOF10: 10
PAINLEVEL_OUTOF10: 9
PAINLEVEL_OUTOF10: 9
PAINLEVEL_OUTOF10: 8
PAINLEVEL_OUTOF10: 9
PAINLEVEL_OUTOF10: 7
PAINLEVEL_OUTOF10: 8

## 2025-02-08 ASSESSMENT — PAIN DESCRIPTION - DESCRIPTORS: DESCRIPTORS: ACHING

## 2025-02-08 ASSESSMENT — PAIN SCALES - WONG BAKER: WONGBAKER_NUMERICALRESPONSE: NO HURT

## 2025-02-08 ASSESSMENT — PAIN DESCRIPTION - ORIENTATION: ORIENTATION: LEFT

## 2025-02-08 NOTE — ED NOTES
Patient is resting in bed with easy and unlabored respirations. Patient requests pain medication. Patient advised to scheduled medication. Call light in reach. Side rails up x2. Patient denies further complaints or concerns. Will monitor.

## 2025-02-08 NOTE — ED NOTES
ED to inpatient nurses report    Chief Complaint   Patient presents with    Abscess      Present to ED from nursing home  LOC: alert and orientated to name, place, date  Vital signs   Vitals:    02/07/25 2222 02/07/25 2238 02/07/25 2351 02/08/25 0004   BP: (!) 149/103 (!) 166/93 127/74 127/70   Pulse: 77 80 75 72   Resp: 21 21 24 29   Temp:       TempSrc:       SpO2: 96% 97% 94% 94%   Weight:       Height:          Oxygen Baseline room air    Current needs required room air Bipap/Cpap No  LDAs:   Peripheral IV 02/07/25 Left Antecubital (Active)   Site Assessment Clean, dry & intact 02/07/25 2236   Line Status Flushed;Blood return noted;Specimen collected 02/07/25 2236     Mobility: Requires assistance * 1  Pending ED orders: complete  Present condition: stable    Electronically signed by Radha Carrillo RN on 2/8/2025 at 12:43 AM

## 2025-02-08 NOTE — ED NOTES
Spoke with Dylon on 3A to approve the transport of patient to 3A05. Pt in stable condition at this time.

## 2025-02-08 NOTE — ED PROVIDER NOTES
atorvastatin (LIPITOR) 40 MG tablet, Take 1 tablet by mouth nightly, Disp: , Rfl:     escitalopram (LEXAPRO) 20 MG tablet, Take 1 tablet by mouth daily, Disp: , Rfl:     fenofibrate 160 MG tablet, Take 1 tablet by mouth daily, Disp: , Rfl:     traZODone (DESYREL) 100 MG tablet, Take 2 tablets by mouth nightly, Disp: 30 tablet, Rfl: 0    Previous Medications    ACETAMINOPHEN (TYLENOL) 325 MG TABLET    Take by mouth every 6 hours as needed for Pain 2 tab    ALBUTEROL (PROVENTIL) (2.5 MG/3ML) 0.083% NEBULIZER SOLUTION    Take 3 mLs by nebulization every 6 hours as needed for Wheezing    ATORVASTATIN (LIPITOR) 40 MG TABLET    Take 1 tablet by mouth nightly    BUPROPION (WELLBUTRIN XL) 150 MG EXTENDED RELEASE TABLET    Take 2 tablets by mouth every morning    CARVEDILOL (COREG) 3.125 MG TABLET    TAKE 1 TABLET BY MOUTH TWICE DAILY    CATHETERS (BARDIA URETHRAL CATHETER 16FR) MISC    1 each by Does not apply route as needed (see) 1 16 fr catheter to be inserted may irrigate as needed with 100ml ns or sterile water daily and prn occlusion   may use any supply vendor    CATHETERS (RALF NORTON INSERTION TRAY) MISC    1 each by Does not apply route as needed (1 insertion tray as needed) May use any vendor brand available to inset 16 fr norton catheter    CHOLECALCIFEROL (VITAMIN D3) 50 MCG (2000 UT) CAPS    Take 1 capsule by mouth daily    CRANBERRY-VITAMIN C-INULIN (UTI-STAT PO)    Take 30 mLs by mouth daily    DICLOFENAC SODIUM (VOLTAREN) 1 % GEL    Apply 4 g topically 2 times daily    DOCUSATE SODIUM (COLACE) 100 MG CAPSULE    Take 1 capsule by mouth 2 times daily as needed for Constipation    ESCITALOPRAM (LEXAPRO) 20 MG TABLET    Take 1 tablet by mouth daily    FENOFIBRATE 160 MG TABLET    Take 1 tablet by mouth daily    FERROUS SULFATE (IRON 325) 325 (65 FE) MG TABLET    Take 1 tablet by mouth every 3 days With breakfast    FLUTICASONE (FLONASE) 50 MCG/ACT NASAL SPRAY    1 spray by Each Nostril route 2 times daily

## 2025-02-08 NOTE — CONSULTS
[facility]  [facility] CCU 3A  [address] [address]  Dept: [phone]  Loc: [phone]  Visit Date: 2/7/2025    Urology Consult Note    Reason for Consult:    History of recurrent perinephric abscesses, drain placed with now purulent drainage around site and cellulitis     Requesting Physician:  Saad Orr    History Obtained From:  patient, electronic medical record    Chief Complaint: drainage nad redness around abscess drain on left.     HISTORY OF PRESENT ILLNESS:                The patient is a 69 y.o. female with significant past medical history of recurrent perinephric fluid collection, simple left nephrectomy at [facility]  who presented with left flank pain and drainage and redness around left drainage tube.  Urology was consulted for history of recurrent perinephric abscesses, drain placed with now purulent drainage around site and cellulitis.  ID is following.      Hx of a recurrent L sided perinephric/retroperitoneal abscess. She has had several hospitalization over the last couple years, with the first being on 1/15/2023. IR has performed multiple procedures to assist with drainage of these formed abscesses. Given the recurrent formation of the abscess despite several interventions, the patient was seen by [facility] on 4/30/2024. She was encouraged to maintain consistent hydration and get a CTU to assess for connection with urinary system. Future treatment options per [facility] included IR drainage at [facility] or undergoing a refractory nephrectomy. On 11/14/2024 she underwent a simple L nephrectomy with the [facility]. [facility] saw 1/2025 for Ct findings of a rim enhancing fluid collection in the L retroperitoneum measuring 10.8 cm in size- increased from prior. Our group recommended transfer to a tertiary care center but the patient has refused. She is now s/p placement of a retroperitoneal abscess drain with IR on 1/16/2025.Hx Fluid culture with heavy 
intermittent dosing (placeholder)   Other RX Placeholder    [START ON 2/9/2025] vancomycin  1,500 mg IntraVENous Q24H     Continuous Infusions:   sodium chloride      dextrose       PRN Meds:albuterol, fluticasone, oxyCODONE-acetaminophen, sodium chloride flush, sodium chloride, potassium chloride **OR** potassium alternative oral replacement **OR** potassium chloride, magnesium sulfate, ondansetron **OR** ondansetron, polyethylene glycol, acetaminophen **OR** acetaminophen, glucose, dextrose bolus **OR** dextrose bolus, glucagon (rDNA), dextrose  Allergies:   ALLERGIES:    Patient has no known allergies.        SOCIAL HISTORY:     TOBACCO:   reports that she has been smoking cigarettes. She started smoking about 47 years ago. She has a 47.8 pack-year smoking history. She has never used smokeless tobacco.     ETOH:   reports no history of alcohol use.  Patient currently lives with family       FAMILY HISTORY:         Problem Relation Age of Onset    Cancer Mother     Heart Disease Mother     High Blood Pressure Mother     High Cholesterol Mother     Cancer Father         brain    Depression Father     Heart Disease Father     High Cholesterol Father     Mental Illness Father     Cancer Sister     Heart Attack Sister     Heart Disease Maternal Uncle     High Cholesterol Maternal Uncle     Diabetes Maternal Grandmother     Heart Disease Maternal Grandmother     High Cholesterol Maternal Grandmother     Early Death Maternal Grandfather     Heart Disease Maternal Grandfather     High Cholesterol Maternal Grandfather     Stroke Maternal Grandfather     Heart Disease Paternal Grandmother     High Cholesterol Paternal Grandmother     Heart Disease Paternal Grandfather     High Cholesterol Paternal Grandfather     Colon Cancer Neg Hx     Inflam Bowel Dis Neg Hx        REVIEW OF SYSTEMS:     Constitutional: no fever, no night sweats, no fatigue, no weight loss.  Head: no head ache , no head injury, no migranes.  Eye: no eye

## 2025-02-08 NOTE — H&P
History & Physical    Patient:  Elli Coulter  YOB: 1955  Date of Service: 2/8/2025  MRN: [id number]   Acct:  [id number]   Primary Care Physician: Ryan Montalvo MD    Chief Complaint: Drainage/redness around left nephrostomy tube    Assessment and plan:  Left flank cellulitis: Left flank drain placement on 1/16 by IR secondary to perinephric abscess.  Patient reports increased pain over the last week.  Presenting with erythema, swelling, warmth, purulent drainage around left flank drain site.  CT showing soft tissue edema likely representing worsening cellulitis, no soft tissue abscess.  WBC and lactic acid within normal limits.  CRP elevated at 26, procalcitonin mildly elevated at 0.19.  Fluid culture from abscess on 1/16 showed Proteus.  Started on Rocephin and vancomycin by ED.  Will continue at this time.  Culture of purulent drainage ordered.  Consult urology and ID.  History of recurrent left-sided perinephric/retroperitoneal abscesses s/p left nephrectomy 11/14/2024, drain placed on 1/16: CT imaging reports left perinephric fluid collection appears nearly completely resolved.  Had outpatient urology appointment previously scheduled.  Will consult urology secondary to above.  Hyponatremia: Sodium 129 corrected to 132 with hyperglycemia.  Was hyponatremic on prior admission in January as well.  Received 1 L fluid bolus in ER.  Repeat BMP in the morning.  Chronic diastolic heart failure: Most recent echo January 2024 showing EF of 60 to 65%, with normal diastolic function.  Prior echo in July 2021 showing G1 DD.  Will monitor intake and output, daily weights.  No evidence of volume overload on exam.   IDDMII: Glucose 257 on arrival.  Continue home Lantus, SSI, hypoglycemia protocol, card control diet.  Primary hypertension: Continue home meds with hold parameters.  Hypothyroidism: Continue Synthroid  COPD, without exacerbation: Albuterol as needed.  RLS: Continue  Lab results mailed to patient per patient request

## 2025-02-08 NOTE — PROCEDURES
PROCEDURE NOTE  Date: 2/8/2025   Name: Elli Coulter  YOB: 1955    Procedures  EKG completed, given to Comfort RN

## 2025-02-08 NOTE — ED TRIAGE NOTES
Pt presents to ED via EMS [facility] w/ c/o abscesses around nephrosotomy drain site. Site appears red and warm to touch. Pt reports 10/10 pain. EMS reports LOS has declined since they saw her last. Facility reports administering percocet 325 mg last at 1645.  while in transit.

## 2025-02-08 NOTE — ED NOTES
Patient is resting in bed with easy and unlabored respirations, eyes closed. Call light in reach. Side rails up x2. Patient denies further complaints or concerns. Will monitor.

## 2025-02-09 ENCOUNTER — TELEPHONE (OUTPATIENT)
Dept: UROLOGY | Age: 70
End: 2025-02-09

## 2025-02-09 PROBLEM — L02.219 CELLULITIS AND ABSCESS OF TRUNK: Status: ACTIVE | Noted: 2025-02-09

## 2025-02-09 PROBLEM — L03.319 CELLULITIS AND ABSCESS OF TRUNK: Status: ACTIVE | Noted: 2025-02-09

## 2025-02-09 LAB
ANION GAP SERPL CALC-SCNC: 10 MEQ/L (ref 8–16)
BASOPHILS ABSOLUTE: 0 THOU/MM3 (ref 0–0.1)
BASOPHILS NFR BLD AUTO: 0.3 %
BUN SERPL-MCNC: 12 MG/DL (ref 7–22)
CALCIUM SERPL-MCNC: 9.4 MG/DL (ref 8.5–10.5)
CHLORIDE SERPL-SCNC: 101 MEQ/L (ref 98–111)
CO2 SERPL-SCNC: 22 MEQ/L (ref 23–33)
CREAT SERPL-MCNC: 0.8 MG/DL (ref 0.4–1.2)
DEPRECATED RDW RBC AUTO: 51.5 FL (ref 35–45)
EKG ATRIAL RATE: 61 BPM
EKG ATRIAL RATE: 65 BPM
EKG P AXIS: 62 DEGREES
EKG P AXIS: 66 DEGREES
EKG P-R INTERVAL: 152 MS
EKG P-R INTERVAL: 156 MS
EKG Q-T INTERVAL: 410 MS
EKG Q-T INTERVAL: 430 MS
EKG QRS DURATION: 80 MS
EKG QRS DURATION: 88 MS
EKG QTC CALCULATION (BAZETT): 426 MS
EKG QTC CALCULATION (BAZETT): 432 MS
EKG R AXIS: 11 DEGREES
EKG R AXIS: 8 DEGREES
EKG T AXIS: 84 DEGREES
EKG T AXIS: 92 DEGREES
EKG VENTRICULAR RATE: 61 BPM
EKG VENTRICULAR RATE: 65 BPM
EOSINOPHIL NFR BLD AUTO: 2.8 %
EOSINOPHILS ABSOLUTE: 0.2 THOU/MM3 (ref 0–0.4)
ERYTHROCYTE [DISTWIDTH] IN BLOOD BY AUTOMATED COUNT: 16.9 % (ref 11.5–14.5)
GFR SERPL CREATININE-BSD FRML MDRD: 80 ML/MIN/1.73M2
GLUCOSE BLD STRIP.AUTO-MCNC: 111 MG/DL (ref 70–108)
GLUCOSE BLD STRIP.AUTO-MCNC: 126 MG/DL (ref 70–108)
GLUCOSE BLD STRIP.AUTO-MCNC: 129 MG/DL (ref 70–108)
GLUCOSE BLD STRIP.AUTO-MCNC: 184 MG/DL (ref 70–108)
GLUCOSE SERPL-MCNC: 99 MG/DL (ref 70–108)
HCT VFR BLD AUTO: 30.8 % (ref 37–47)
HGB BLD-MCNC: 9.1 GM/DL (ref 12–16)
IMM GRANULOCYTES # BLD AUTO: 0.04 THOU/MM3 (ref 0–0.07)
IMM GRANULOCYTES NFR BLD AUTO: 0.6 %
LYMPHOCYTES ABSOLUTE: 1.5 THOU/MM3 (ref 1–4.8)
LYMPHOCYTES NFR BLD AUTO: 23.1 %
MCH RBC QN AUTO: 25.2 PG (ref 26–33)
MCHC RBC AUTO-ENTMCNC: 29.5 GM/DL (ref 32.2–35.5)
MCV RBC AUTO: 85.3 FL (ref 81–99)
MONOCYTES ABSOLUTE: 0.7 THOU/MM3 (ref 0.4–1.3)
MONOCYTES NFR BLD AUTO: 11.3 %
NEUTROPHILS ABSOLUTE: 4 THOU/MM3 (ref 1.8–7.7)
NEUTROPHILS NFR BLD AUTO: 61.9 %
NRBC BLD AUTO-RTO: 0 /100 WBC
PLATELET # BLD AUTO: 195 THOU/MM3 (ref 130–400)
PMV BLD AUTO: 9.7 FL (ref 9.4–12.4)
POTASSIUM SERPL-SCNC: 4.2 MEQ/L (ref 3.5–5.2)
RBC # BLD AUTO: 3.61 MILL/MM3 (ref 4.2–5.4)
SODIUM SERPL-SCNC: 133 MEQ/L (ref 135–145)
TROPONIN, HIGH SENSITIVITY: 19 NG/L (ref 0–12)
WBC # BLD AUTO: 6.4 THOU/MM3 (ref 4.8–10.8)

## 2025-02-09 PROCEDURE — 93010 ELECTROCARDIOGRAM REPORT: CPT | Performed by: NUCLEAR MEDICINE

## 2025-02-09 PROCEDURE — 99233 SBSQ HOSP IP/OBS HIGH 50: CPT | Performed by: INTERNAL MEDICINE

## 2025-02-09 PROCEDURE — 85025 COMPLETE CBC W/AUTO DIFF WBC: CPT

## 2025-02-09 PROCEDURE — 6370000000 HC RX 637 (ALT 250 FOR IP): Performed by: INTERNAL MEDICINE

## 2025-02-09 PROCEDURE — 80048 BASIC METABOLIC PNL TOTAL CA: CPT

## 2025-02-09 PROCEDURE — 0WPF30Z REMOVAL OF DRAINAGE DEVICE FROM ABDOMINAL WALL, PERCUTANEOUS APPROACH: ICD-10-PCS | Performed by: STUDENT IN AN ORGANIZED HEALTH CARE EDUCATION/TRAINING PROGRAM

## 2025-02-09 PROCEDURE — 6360000002 HC RX W HCPCS

## 2025-02-09 PROCEDURE — 36415 COLL VENOUS BLD VENIPUNCTURE: CPT

## 2025-02-09 PROCEDURE — 99231 SBSQ HOSP IP/OBS SF/LOW 25: CPT | Performed by: NURSE PRACTITIONER

## 2025-02-09 PROCEDURE — 6370000000 HC RX 637 (ALT 250 FOR IP)

## 2025-02-09 PROCEDURE — 1200000003 HC TELEMETRY R&B

## 2025-02-09 PROCEDURE — 2500000003 HC RX 250 WO HCPCS

## 2025-02-09 PROCEDURE — 84484 ASSAY OF TROPONIN QUANT: CPT

## 2025-02-09 PROCEDURE — 93005 ELECTROCARDIOGRAM TRACING: CPT | Performed by: INTERNAL MEDICINE

## 2025-02-09 PROCEDURE — 1200000000 HC SEMI PRIVATE

## 2025-02-09 PROCEDURE — 82948 REAGENT STRIP/BLOOD GLUCOSE: CPT

## 2025-02-09 RX ORDER — INSULIN GLARGINE 100 [IU]/ML
10 INJECTION, SOLUTION SUBCUTANEOUS NIGHTLY
Status: DISCONTINUED | OUTPATIENT
Start: 2025-02-09 | End: 2025-02-18 | Stop reason: HOSPADM

## 2025-02-09 RX ADMIN — SODIUM CHLORIDE, PRESERVATIVE FREE 10 ML: 5 INJECTION INTRAVENOUS at 19:50

## 2025-02-09 RX ADMIN — PANTOPRAZOLE SODIUM 40 MG: 40 TABLET, DELAYED RELEASE ORAL at 17:12

## 2025-02-09 RX ADMIN — BUPROPION HYDROCHLORIDE 300 MG: 300 TABLET, EXTENDED RELEASE ORAL at 11:33

## 2025-02-09 RX ADMIN — OXYCODONE HYDROCHLORIDE AND ACETAMINOPHEN 1 TABLET: 5; 325 TABLET ORAL at 17:11

## 2025-02-09 RX ADMIN — CARVEDILOL 3.12 MG: 3.12 TABLET, FILM COATED ORAL at 21:02

## 2025-02-09 RX ADMIN — TRAZODONE HYDROCHLORIDE 200 MG: 100 TABLET ORAL at 19:49

## 2025-02-09 RX ADMIN — OXYCODONE HYDROCHLORIDE AND ACETAMINOPHEN 1 TABLET: 5; 325 TABLET ORAL at 23:25

## 2025-02-09 RX ADMIN — LEVOTHYROXINE SODIUM 75 MCG: 0.07 TABLET ORAL at 06:05

## 2025-02-09 RX ADMIN — SODIUM CHLORIDE, PRESERVATIVE FREE 10 ML: 5 INJECTION INTRAVENOUS at 09:44

## 2025-02-09 RX ADMIN — ENOXAPARIN SODIUM 40 MG: 100 INJECTION SUBCUTANEOUS at 09:41

## 2025-02-09 RX ADMIN — CARVEDILOL 3.12 MG: 3.12 TABLET, FILM COATED ORAL at 11:33

## 2025-02-09 RX ADMIN — FENOFIBRATE 160 MG: 160 TABLET ORAL at 11:33

## 2025-02-09 RX ADMIN — ATORVASTATIN CALCIUM 40 MG: 40 TABLET, FILM COATED ORAL at 21:02

## 2025-02-09 RX ADMIN — OXYCODONE HYDROCHLORIDE AND ACETAMINOPHEN 1 TABLET: 5; 325 TABLET ORAL at 03:27

## 2025-02-09 RX ADMIN — Medication 1500 MG: at 01:37

## 2025-02-09 RX ADMIN — PANTOPRAZOLE SODIUM 40 MG: 40 TABLET, DELAYED RELEASE ORAL at 06:05

## 2025-02-09 RX ADMIN — WATER 1000 MG: 1 INJECTION INTRAMUSCULAR; INTRAVENOUS; SUBCUTANEOUS at 01:33

## 2025-02-09 RX ADMIN — FOLIC ACID 1 MG: 1 TABLET ORAL at 11:33

## 2025-02-09 RX ADMIN — OXYCODONE HYDROCHLORIDE AND ACETAMINOPHEN 1 TABLET: 5; 325 TABLET ORAL at 09:41

## 2025-02-09 RX ADMIN — INSULIN GLARGINE 10 UNITS: 100 INJECTION, SOLUTION SUBCUTANEOUS at 19:49

## 2025-02-09 RX ADMIN — ESCITALOPRAM OXALATE 20 MG: 20 TABLET ORAL at 11:33

## 2025-02-09 RX ADMIN — QUETIAPINE FUMARATE 200 MG: 100 TABLET ORAL at 21:02

## 2025-02-09 ASSESSMENT — PAIN DESCRIPTION - LOCATION
LOCATION: ABDOMEN
LOCATION: BACK
LOCATION: FLANK
LOCATION: BACK
LOCATION: FLANK

## 2025-02-09 ASSESSMENT — PAIN DESCRIPTION - ORIENTATION
ORIENTATION: LEFT

## 2025-02-09 ASSESSMENT — PAIN SCALES - GENERAL
PAINLEVEL_OUTOF10: 4
PAINLEVEL_OUTOF10: 9
PAINLEVEL_OUTOF10: 8
PAINLEVEL_OUTOF10: 0
PAINLEVEL_OUTOF10: 3
PAINLEVEL_OUTOF10: 7
PAINLEVEL_OUTOF10: 3
PAINLEVEL_OUTOF10: 7

## 2025-02-09 ASSESSMENT — PAIN DESCRIPTION - DESCRIPTORS
DESCRIPTORS: ACHING
DESCRIPTORS: ACHING;CRAMPING
DESCRIPTORS: ACHING

## 2025-02-09 ASSESSMENT — PAIN - FUNCTIONAL ASSESSMENT
PAIN_FUNCTIONAL_ASSESSMENT: ACTIVITIES ARE NOT PREVENTED
PAIN_FUNCTIONAL_ASSESSMENT: ACTIVITIES ARE NOT PREVENTED
PAIN_FUNCTIONAL_ASSESSMENT: PREVENTS OR INTERFERES SOME ACTIVE ACTIVITIES AND ADLS
PAIN_FUNCTIONAL_ASSESSMENT: PREVENTS OR INTERFERES SOME ACTIVE ACTIVITIES AND ADLS

## 2025-02-09 ASSESSMENT — PAIN DESCRIPTION - ONSET: ONSET: ON-GOING

## 2025-02-09 ASSESSMENT — PAIN DESCRIPTION - FREQUENCY: FREQUENCY: CONTINUOUS

## 2025-02-09 NOTE — RT PROTOCOL NOTE
RT Inhaler-Nebulizer Bronchodilator Protocol Note    There is a bronchodilator order in the chart from a provider indicating to follow the RT Bronchodilator Protocol and there is an “Initiate RT Inhaler-Nebulizer Bronchodilator Protocol” order as well (see protocol at bottom of note).    CXR Findings:  No results found.    The findings from the last RT Protocol Assessment were as follows:   History Pulmonary Disease: Chronic pulmonary disease  Respiratory Pattern: Regular pattern and RR 12-20 bpm  Breath Sounds: Intermittent or unilateral wheezes  Cough: Strong, spontaneous, non-productive  Indication for Bronchodilator Therapy: Unable to speak in sentences  Bronchodilator Assessment Score: 6    Aerosolized bronchodilator medication orders have been revised according to the RT Inhaler-Nebulizer Bronchodilator Protocol below.    Respiratory Therapist to perform RT Therapy Protocol Assessment initially then follow the protocol.  Repeat RT Therapy Protocol Assessment PRN for score 0-3 or on second treatment, BID, and PRN for scores above 3.    No Indications - adjust the frequency to every 6 hours PRN wheezing or bronchospasm, if no treatments needed after 48 hours then discontinue using Per Protocol order mode.     If indication present, adjust the RT bronchodilator orders based on the Bronchodilator Assessment Score as indicated below.  Use Inhaler orders unless patient has one or more of the following: on home nebulizer, not able to hold breath for 10 seconds, is not alert and oriented, cannot activate and use MDI correctly, or respiratory rate 25 breaths per minute or more, then use the equivalent nebulizer order(s) with same Frequency and PRN reasons based on the score.  If a patient is on this medication at home then do not decrease Frequency below that used at home.    0-3 - enter or revise RT bronchodilator order(s) to equivalent RT Bronchodilator order with Frequency of every 4 hours PRN for wheezing or

## 2025-02-09 NOTE — CARE COORDINATION
02/09/25 1015   Service Assessment   Patient Orientation Alert and Oriented   Cognition Alert   History Provided By Patient   Primary Caregiver Other (Comment)  (Longterm at [facility].)   Support Systems /;Other (Comment);Family Members  ([facility] staff)   Patient's Healthcare Decision Maker is: Named in Scanned ACP Document   PCP Verified by CM Yes   Last Visit to PCP Within last year  (Uncertain.)   Prior Functional Level Assistance with the following:;Bathing;Cooking;Housework;Shopping;Mobility   Current Functional Level Assistance with the following:;Bathing;Dressing;Toileting;Cooking;Housework;Shopping;Mobility   Can patient return to prior living arrangement Yes   Ability to make needs known: Good   Family able to assist with home care needs: No   Would you like for me to discuss the discharge plan with any other family members/significant others, and if so, who? No   Financial Resources Medicare;Medicaid   Community Resources ECF/Home Care   Social/Functional History   Lives With Other (Comment)  (ECF)   Active  No   Patient's  Info Pt in ECF.  Sister assists with transportation.   Discharge Planning   Type of Residence Long-Term Care   Living Arrangements Other (Comment)  (ECF)   Current Services Prior To Admission None   DME Ordered? No   Potential Assistance Purchasing Medications No   Type of Home Care Services None   Patient expects to be discharged to: Long-term care   Services At/After Discharge   Transition of Care Consult (CM Consult) Discharge Planning   Mode of Transport at Discharge Other (see comment)  (Pt states she will need transportation.)   Confirm Follow Up Transport Other (see comment)  (Family)   Condition of Participation: Discharge Planning   The Plan for Transition of Care is related to the following treatment goals: Get infection better.   Patient Goals/Plan/Treatment Preferences: Mercedes is from [facility]. She plans to return there at discharge. SW

## 2025-02-09 NOTE — PROCEDURES
PROCEDURE NOTE  Date: 2/9/2025   Name: Elli Coulter  YOB: 1955    Procedures  EKG completed, given to Jose PUTNAM

## 2025-02-09 NOTE — CARE COORDINATION
02/09/25 0723   Readmission Assessment   Number of Days since last admission? 8-30 days   Previous Disposition SNF   Who is being Interviewed   (Medical Record. Pt came from SNF.)   What was the patient's/caregiver's perception as to why they think they needed to return back to the hospital? Other (Comment)  (Left flank pain.)   Did you visit your Primary Care Physician after you left the hospital, before you returned this time? No  (Uncertain. Pt from [facility].)   Why weren't you able to visit your PCP? Other (Comment)  (From SNF)   Did you see a specialist, such as Cardiac, Pulmonary, Orthopedic Physician, etc. after you left the hospital? Other (Comment)  (Uncertain.)   Does the patient report anything that got in the way of taking their medications? No   In our efforts to provide the best possible care to you and others like you, can you think of anything that we could have done to help you after you left the hospital the first time, so that you might not have needed to return so soon? Other (Comment)  (Pt presented from SNF.)

## 2025-02-10 LAB
GLUCOSE BLD STRIP.AUTO-MCNC: 122 MG/DL (ref 70–108)
GLUCOSE BLD STRIP.AUTO-MCNC: 132 MG/DL (ref 70–108)
GLUCOSE BLD STRIP.AUTO-MCNC: 153 MG/DL (ref 70–108)
GLUCOSE BLD STRIP.AUTO-MCNC: 155 MG/DL (ref 70–108)
GLUCOSE BLD STRIP.AUTO-MCNC: 183 MG/DL (ref 70–108)
GLUCOSE BLD STRIP.AUTO-MCNC: 190 MG/DL (ref 70–108)
VANCOMYCIN SERPL-MCNC: 11.9 UG/ML (ref 10–39.9)

## 2025-02-10 PROCEDURE — 1200000000 HC SEMI PRIVATE

## 2025-02-10 PROCEDURE — 97161 PT EVAL LOW COMPLEX 20 MIN: CPT

## 2025-02-10 PROCEDURE — 82948 REAGENT STRIP/BLOOD GLUCOSE: CPT

## 2025-02-10 PROCEDURE — 97116 GAIT TRAINING THERAPY: CPT

## 2025-02-10 PROCEDURE — 6370000000 HC RX 637 (ALT 250 FOR IP)

## 2025-02-10 PROCEDURE — 87641 MR-STAPH DNA AMP PROBE: CPT

## 2025-02-10 PROCEDURE — 99232 SBSQ HOSP IP/OBS MODERATE 35: CPT | Performed by: INTERNAL MEDICINE

## 2025-02-10 PROCEDURE — 80202 ASSAY OF VANCOMYCIN: CPT

## 2025-02-10 PROCEDURE — 6360000002 HC RX W HCPCS

## 2025-02-10 PROCEDURE — 36415 COLL VENOUS BLD VENIPUNCTURE: CPT

## 2025-02-10 PROCEDURE — 6370000000 HC RX 637 (ALT 250 FOR IP): Performed by: INTERNAL MEDICINE

## 2025-02-10 PROCEDURE — 1200000003 HC TELEMETRY R&B

## 2025-02-10 PROCEDURE — 2580000003 HC RX 258

## 2025-02-10 PROCEDURE — 2500000003 HC RX 250 WO HCPCS

## 2025-02-10 RX ORDER — AMLODIPINE BESYLATE 5 MG/1
5 TABLET ORAL DAILY
Status: DISCONTINUED | OUTPATIENT
Start: 2025-02-10 | End: 2025-02-11

## 2025-02-10 RX ADMIN — WATER 1000 MG: 1 INJECTION INTRAMUSCULAR; INTRAVENOUS; SUBCUTANEOUS at 00:39

## 2025-02-10 RX ADMIN — ROPINIROLE HYDROCHLORIDE 0.5 MG: 0.5 TABLET, FILM COATED ORAL at 20:18

## 2025-02-10 RX ADMIN — PANTOPRAZOLE SODIUM 40 MG: 40 TABLET, DELAYED RELEASE ORAL at 05:22

## 2025-02-10 RX ADMIN — OXYCODONE HYDROCHLORIDE AND ACETAMINOPHEN 1 TABLET: 5; 325 TABLET ORAL at 19:07

## 2025-02-10 RX ADMIN — FOLIC ACID 1 MG: 1 TABLET ORAL at 08:45

## 2025-02-10 RX ADMIN — AMLODIPINE BESYLATE 5 MG: 5 TABLET ORAL at 18:30

## 2025-02-10 RX ADMIN — Medication 1500 MG: at 00:41

## 2025-02-10 RX ADMIN — CARVEDILOL 3.12 MG: 3.12 TABLET, FILM COATED ORAL at 20:19

## 2025-02-10 RX ADMIN — OXYCODONE HYDROCHLORIDE AND ACETAMINOPHEN 1 TABLET: 5; 325 TABLET ORAL at 12:49

## 2025-02-10 RX ADMIN — BUPROPION HYDROCHLORIDE 300 MG: 300 TABLET, EXTENDED RELEASE ORAL at 08:45

## 2025-02-10 RX ADMIN — QUETIAPINE FUMARATE 200 MG: 100 TABLET ORAL at 20:19

## 2025-02-10 RX ADMIN — TRAZODONE HYDROCHLORIDE 200 MG: 100 TABLET ORAL at 20:23

## 2025-02-10 RX ADMIN — LEVOTHYROXINE SODIUM 75 MCG: 0.07 TABLET ORAL at 05:21

## 2025-02-10 RX ADMIN — ATORVASTATIN CALCIUM 40 MG: 40 TABLET, FILM COATED ORAL at 20:19

## 2025-02-10 RX ADMIN — FENOFIBRATE 160 MG: 160 TABLET ORAL at 08:46

## 2025-02-10 RX ADMIN — ESCITALOPRAM OXALATE 20 MG: 20 TABLET ORAL at 08:45

## 2025-02-10 RX ADMIN — Medication 1250 MG: at 15:40

## 2025-02-10 RX ADMIN — OXYCODONE HYDROCHLORIDE AND ACETAMINOPHEN 1 TABLET: 5; 325 TABLET ORAL at 05:22

## 2025-02-10 RX ADMIN — PANTOPRAZOLE SODIUM 40 MG: 40 TABLET, DELAYED RELEASE ORAL at 15:42

## 2025-02-10 RX ADMIN — ENOXAPARIN SODIUM 40 MG: 100 INJECTION SUBCUTANEOUS at 09:17

## 2025-02-10 RX ADMIN — INSULIN GLARGINE 10 UNITS: 100 INJECTION, SOLUTION SUBCUTANEOUS at 20:23

## 2025-02-10 ASSESSMENT — PAIN DESCRIPTION - ORIENTATION
ORIENTATION: LOWER;LEFT
ORIENTATION: LEFT;LOWER

## 2025-02-10 ASSESSMENT — PAIN DESCRIPTION - DESCRIPTORS
DESCRIPTORS: ACHING
DESCRIPTORS: SHARP
DESCRIPTORS: SHARP

## 2025-02-10 ASSESSMENT — PAIN SCALES - GENERAL
PAINLEVEL_OUTOF10: 8
PAINLEVEL_OUTOF10: 8

## 2025-02-10 ASSESSMENT — PAIN DESCRIPTION - LOCATION
LOCATION: BACK
LOCATION: BACK

## 2025-02-10 NOTE — DISCHARGE INSTR - COC
Bilateral 2016    decompression    HYSTERECTOMY (CERVIX STATUS UNKNOWN)  1998    Dr. Manuel HUTCHINSON with [facility]    MANDIBLE FRACTURE SURGERY  1984    ROTATOR CUFF REPAIR  2004, 2014    right shoulder    SHOULDER SURGERY  2014    right    SKIN BIOPSY  2006    under left eye    STIMULATOR SURGERY N/A 11/4/2020    STAGE 1 INTERSTIM PLACEMENT performed by Chris Avila MD at [facility] OR    STIMULATOR SURGERY N/A 11/23/2020    PLACEMENT OF STAGE II INTERSTIM performed by Chris Avila MD at [facility] OR    STIMULATOR SURGERY N/A 4/14/2022    Removal of Interstim performed by Sami Lanier MD at [facility] OR    URETER SURGERY Left 1/12/2023    Cystoscopy Left Ureteroscopy Laser Lithotripsy Basket Retrieval of Stone Fragments possible Left Ureteral Stent performed by Sami Lanier MD at [facility] OR       Immunization History:   Immunization History   Administered Date(s) Administered    COVID-19, J&J, (age 18y+), IM, 0.5 mL 04/27/2021    COVID-19, MODERNA BLUE border, Primary or Immunocompromised, (age 12y+), IM, 100 mcg/0.5mL 01/11/2022    Hepatitis A Vaccine 02/16/2012, 10/14/2013    Hepatitis B vaccine 02/16/2012    Influenza Virus Vaccine 02/16/2012, 10/10/2012, 10/14/2013, 09/12/2014, 10/11/2015, 10/27/2016, 11/14/2017, 10/08/2018    Influenza, FLUARIX, FLULAVAL, FLUZONE (age 6 mo+) and AFLURIA, (age 3 y+), Quadv PF, 0.5mL 10/27/2016, 10/08/2018    Influenza, FLUCELVAX, (age 6 mo+), MDCK, Quadv MDV, 0.5mL 10/01/2019, 10/21/2020    Pneumococcal Vaccine 02/16/2012    Pneumococcal, PPSV23, PNEUMOVAX 23, (age 2y+), SC/IM, 0.5mL 02/16/2012, 10/11/2015    TDaP, ADACEL (age 10y-64y), BOOSTRIX (age 10y+), IM, 0.5mL 06/24/2013, 04/22/2016, 06/11/2019       Active Problems:  Patient Active Problem List   Diagnosis Code    GERD (gastroesophageal reflux disease) K21.9    Colon polyps K63.5    COPD (chronic obstructive pulmonary disease) ([facility]) J44.9    Bipolar 1 disorder ([facility]) F31.9    Chronic pain G89.29    Hiatal hernia K44.9    Hyponatremia

## 2025-02-10 NOTE — CARE COORDINATION
2/10/25, 11:50 AM EST  Discharge Planning Evaluation  Social work consult received, patient from [facility].    Patient/Family preference is to return to [facility].    The patient's current payor source at the facility is [facility] Medicare.   Medicare skilled days available: yes  Medicare does the patient have a three midnight qualifying stay? na  Insurance precert:  yes  Spoke with Elliot at the facility.  Patient bed hold: yes  Anticipated transport plan: unsure  Patient's Healthcare Decision Maker: Named in Scanned ACP Document  Readmission Risk Low 0-14, Mod 15-19), High > 20: Readmission Risk Score: 32.6    Current PCP: Ryan Montalvo MD  PCP verified by CM? Yes    Patient Orientation: Alert and Oriented    Patient Cognition: Alert  History Provided by: Patient    Advance Directives:      Code Status: Full Code   Patient's Primary Decision Maker is: Named in Scanned ACP Document    Secondary Decision Maker (Active): Bailey Trimble - Brother/Sister - [phone]     Discharge Planning:    Patient lives with: Other (Comment) ([facility]) Type of Home: Long-Term Care  Primary Care Giver: Other (Comment) (Longterm at [facility].)  Patient Support Systems include: /, Other (Comment), Family Members ([facility] staff)   Current Financial resources: Medicare, Medicaid  Current community resources: [facility]/Home Care  Current services prior to admission: None            Current DME:              Type of Home Care services:  None    ADLS  Prior functional level: Assistance with the following:, Bathing, Cooking, Housework, Shopping, Mobility  Current functional level: Assistance with the following:, Bathing, Dressing, Toileting, Cooking, Housework, Shopping, Mobility    Family can provide assistance at DC: No  Would you like Case Management to discuss the discharge plan with any other family members/significant others, and if so, who? No  Plans to Return to Present Housing: Yes  Other Identified Issues/Barriers to

## 2025-02-11 LAB
ANION GAP SERPL CALC-SCNC: 12 MEQ/L (ref 8–16)
BACTERIA BLD AEROBE CULT: NORMAL
BACTERIA BLD AEROBE CULT: NORMAL
BACTERIA SPEC AEROBE CULT: ABNORMAL
BACTERIA SPEC AEROBE CULT: ABNORMAL
BACTERIA SPEC ANAEROBE CULT: ABNORMAL
BASOPHILS ABSOLUTE: 0 THOU/MM3 (ref 0–0.1)
BASOPHILS NFR BLD AUTO: 1 %
BUN SERPL-MCNC: 20 MG/DL (ref 7–22)
CALCIUM SERPL-MCNC: 9.8 MG/DL (ref 8.5–10.5)
CHLORIDE SERPL-SCNC: 105 MEQ/L (ref 98–111)
CO2 SERPL-SCNC: 20 MEQ/L (ref 23–33)
CREAT SERPL-MCNC: 0.8 MG/DL (ref 0.4–1.2)
DEPRECATED RDW RBC AUTO: 52.8 FL (ref 35–45)
EOSINOPHIL NFR BLD AUTO: 4 %
EOSINOPHILS ABSOLUTE: 0.2 THOU/MM3 (ref 0–0.4)
ERYTHROCYTE [DISTWIDTH] IN BLOOD BY AUTOMATED COUNT: 16.9 % (ref 11.5–14.5)
GFR SERPL CREATININE-BSD FRML MDRD: 80 ML/MIN/1.73M2
GLUCOSE BLD STRIP.AUTO-MCNC: 124 MG/DL (ref 70–108)
GLUCOSE BLD STRIP.AUTO-MCNC: 127 MG/DL (ref 70–108)
GLUCOSE BLD STRIP.AUTO-MCNC: 162 MG/DL (ref 70–108)
GLUCOSE BLD STRIP.AUTO-MCNC: 174 MG/DL (ref 70–108)
GLUCOSE SERPL-MCNC: 138 MG/DL (ref 70–108)
GRAM STN SPEC: ABNORMAL
HCT VFR BLD AUTO: 32.7 % (ref 37–47)
HGB BLD-MCNC: 9.6 GM/DL (ref 12–16)
IMM GRANULOCYTES # BLD AUTO: 0.04 THOU/MM3 (ref 0–0.07)
IMM GRANULOCYTES NFR BLD AUTO: 1 %
LYMPHOCYTES ABSOLUTE: 1.8 THOU/MM3 (ref 1–4.8)
LYMPHOCYTES NFR BLD AUTO: 44.1 %
MCH RBC QN AUTO: 25.5 PG (ref 26–33)
MCHC RBC AUTO-ENTMCNC: 29.4 GM/DL (ref 32.2–35.5)
MCV RBC AUTO: 86.7 FL (ref 81–99)
MONOCYTES ABSOLUTE: 0.5 THOU/MM3 (ref 0.4–1.3)
MONOCYTES NFR BLD AUTO: 13 %
MRSA DNA SPEC QL NAA+PROBE: NEGATIVE
NEUTROPHILS ABSOLUTE: 1.5 THOU/MM3 (ref 1.8–7.7)
NEUTROPHILS NFR BLD AUTO: 36.9 %
NRBC BLD AUTO-RTO: 0 /100 WBC
ORGANISM: ABNORMAL
ORGANISM: ABNORMAL
PLATELET # BLD AUTO: 292 THOU/MM3 (ref 130–400)
PMV BLD AUTO: 9.1 FL (ref 9.4–12.4)
POTASSIUM SERPL-SCNC: 4.8 MEQ/L (ref 3.5–5.2)
RBC # BLD AUTO: 3.77 MILL/MM3 (ref 4.2–5.4)
SODIUM SERPL-SCNC: 137 MEQ/L (ref 135–145)
WBC # BLD AUTO: 4 THOU/MM3 (ref 4.8–10.8)

## 2025-02-11 PROCEDURE — 36415 COLL VENOUS BLD VENIPUNCTURE: CPT

## 2025-02-11 PROCEDURE — 6360000002 HC RX W HCPCS

## 2025-02-11 PROCEDURE — 85025 COMPLETE CBC W/AUTO DIFF WBC: CPT

## 2025-02-11 PROCEDURE — 97535 SELF CARE MNGMENT TRAINING: CPT

## 2025-02-11 PROCEDURE — 6370000000 HC RX 637 (ALT 250 FOR IP)

## 2025-02-11 PROCEDURE — 82948 REAGENT STRIP/BLOOD GLUCOSE: CPT

## 2025-02-11 PROCEDURE — 80048 BASIC METABOLIC PNL TOTAL CA: CPT

## 2025-02-11 PROCEDURE — 6370000000 HC RX 637 (ALT 250 FOR IP): Performed by: INTERNAL MEDICINE

## 2025-02-11 PROCEDURE — 97166 OT EVAL MOD COMPLEX 45 MIN: CPT

## 2025-02-11 PROCEDURE — 1200000000 HC SEMI PRIVATE

## 2025-02-11 PROCEDURE — 99232 SBSQ HOSP IP/OBS MODERATE 35: CPT | Performed by: INTERNAL MEDICINE

## 2025-02-11 PROCEDURE — 2500000003 HC RX 250 WO HCPCS

## 2025-02-11 PROCEDURE — 1200000003 HC TELEMETRY R&B

## 2025-02-11 RX ORDER — DOCUSATE SODIUM 100 MG/1
100 CAPSULE, LIQUID FILLED ORAL 2 TIMES DAILY
COMMUNITY

## 2025-02-11 RX ORDER — DIVALPROEX SODIUM 125 MG/1
125 TABLET, DELAYED RELEASE ORAL 2 TIMES DAILY
COMMUNITY

## 2025-02-11 RX ORDER — INSULIN GLARGINE 100 [IU]/ML
14 INJECTION, SOLUTION SUBCUTANEOUS NIGHTLY
Status: ON HOLD | COMMUNITY
End: 2025-02-18

## 2025-02-11 RX ORDER — LEVOTHYROXINE SODIUM 150 UG/1
150 TABLET ORAL
Status: DISCONTINUED | OUTPATIENT
Start: 2025-02-16 | End: 2025-02-18 | Stop reason: HOSPADM

## 2025-02-11 RX ORDER — LEVOTHYROXINE SODIUM 150 UG/1
150 TABLET ORAL WEEKLY
COMMUNITY

## 2025-02-11 RX ORDER — HYDROCODONE BITARTRATE AND ACETAMINOPHEN 5; 325 MG/1; MG/1
1 TABLET ORAL EVERY 4 HOURS PRN
Status: ON HOLD | COMMUNITY
End: 2025-02-18 | Stop reason: HOSPADM

## 2025-02-11 RX ORDER — LIDOCAINE 4 G/G
1 PATCH TOPICAL DAILY PRN
COMMUNITY

## 2025-02-11 RX ORDER — CEFADROXIL 500 MG/1
500 CAPSULE ORAL 2 TIMES DAILY
Status: ON HOLD | COMMUNITY
Start: 2025-02-07 | End: 2025-02-18

## 2025-02-11 RX ORDER — DIVALPROEX SODIUM 125 MG/1
125 CAPSULE, COATED PELLETS ORAL 2 TIMES DAILY
Status: DISCONTINUED | OUTPATIENT
Start: 2025-02-11 | End: 2025-02-18 | Stop reason: HOSPADM

## 2025-02-11 RX ORDER — AMLODIPINE BESYLATE 10 MG/1
10 TABLET ORAL DAILY
Status: DISCONTINUED | OUTPATIENT
Start: 2025-02-12 | End: 2025-02-18 | Stop reason: HOSPADM

## 2025-02-11 RX ORDER — LEVOTHYROXINE SODIUM 75 UG/1
75 TABLET ORAL
Status: DISCONTINUED | OUTPATIENT
Start: 2025-02-12 | End: 2025-02-18 | Stop reason: HOSPADM

## 2025-02-11 RX ORDER — FLUTICASONE PROPIONATE 50 MCG
1 SPRAY, SUSPENSION (ML) NASAL 2 TIMES DAILY PRN
COMMUNITY

## 2025-02-11 RX ADMIN — CARVEDILOL 3.12 MG: 3.12 TABLET, FILM COATED ORAL at 21:40

## 2025-02-11 RX ADMIN — FOLIC ACID 1 MG: 1 TABLET ORAL at 09:52

## 2025-02-11 RX ADMIN — AMLODIPINE BESYLATE 5 MG: 5 TABLET ORAL at 09:52

## 2025-02-11 RX ADMIN — OXYCODONE HYDROCHLORIDE AND ACETAMINOPHEN 1 TABLET: 5; 325 TABLET ORAL at 15:56

## 2025-02-11 RX ADMIN — ESCITALOPRAM OXALATE 20 MG: 20 TABLET ORAL at 09:51

## 2025-02-11 RX ADMIN — BUPROPION HYDROCHLORIDE 300 MG: 300 TABLET, EXTENDED RELEASE ORAL at 09:51

## 2025-02-11 RX ADMIN — ATORVASTATIN CALCIUM 40 MG: 40 TABLET, FILM COATED ORAL at 21:42

## 2025-02-11 RX ADMIN — PANTOPRAZOLE SODIUM 40 MG: 40 TABLET, DELAYED RELEASE ORAL at 05:30

## 2025-02-11 RX ADMIN — PANTOPRAZOLE SODIUM 40 MG: 40 TABLET, DELAYED RELEASE ORAL at 15:56

## 2025-02-11 RX ADMIN — ENOXAPARIN SODIUM 40 MG: 100 INJECTION SUBCUTANEOUS at 09:51

## 2025-02-11 RX ADMIN — DIVALPROEX SODIUM 125 MG: 125 CAPSULE ORAL at 09:56

## 2025-02-11 RX ADMIN — Medication 1250 MG: at 05:35

## 2025-02-11 RX ADMIN — FENOFIBRATE 160 MG: 160 TABLET ORAL at 09:51

## 2025-02-11 RX ADMIN — WATER 1000 MG: 1 INJECTION INTRAMUSCULAR; INTRAVENOUS; SUBCUTANEOUS at 00:31

## 2025-02-11 RX ADMIN — SODIUM CHLORIDE, PRESERVATIVE FREE 10 ML: 5 INJECTION INTRAVENOUS at 21:40

## 2025-02-11 RX ADMIN — OXYCODONE HYDROCHLORIDE AND ACETAMINOPHEN 1 TABLET: 5; 325 TABLET ORAL at 21:40

## 2025-02-11 RX ADMIN — DIVALPROEX SODIUM 125 MG: 125 CAPSULE ORAL at 21:43

## 2025-02-11 RX ADMIN — LEVOTHYROXINE SODIUM 75 MCG: 0.07 TABLET ORAL at 05:30

## 2025-02-11 RX ADMIN — Medication 1250 MG: at 18:07

## 2025-02-11 RX ADMIN — OXYCODONE HYDROCHLORIDE AND ACETAMINOPHEN 1 TABLET: 5; 325 TABLET ORAL at 09:50

## 2025-02-11 RX ADMIN — ROPINIROLE HYDROCHLORIDE 0.5 MG: 0.5 TABLET, FILM COATED ORAL at 21:40

## 2025-02-11 RX ADMIN — OXYCODONE HYDROCHLORIDE AND ACETAMINOPHEN 1 TABLET: 5; 325 TABLET ORAL at 00:32

## 2025-02-11 RX ADMIN — INSULIN GLARGINE 10 UNITS: 100 INJECTION, SOLUTION SUBCUTANEOUS at 21:42

## 2025-02-11 RX ADMIN — TRAZODONE HYDROCHLORIDE 200 MG: 100 TABLET ORAL at 21:40

## 2025-02-11 ASSESSMENT — PAIN DESCRIPTION - LOCATION
LOCATION: ABDOMEN
LOCATION: FLANK;ABDOMEN
LOCATION: ABDOMEN;FLANK

## 2025-02-11 ASSESSMENT — PAIN SCALES - GENERAL
PAINLEVEL_OUTOF10: 0
PAINLEVEL_OUTOF10: 7
PAINLEVEL_OUTOF10: 7
PAINLEVEL_OUTOF10: 8
PAINLEVEL_OUTOF10: 0
PAINLEVEL_OUTOF10: 8

## 2025-02-11 ASSESSMENT — PAIN DESCRIPTION - ORIENTATION
ORIENTATION: LEFT
ORIENTATION: LEFT

## 2025-02-11 ASSESSMENT — PAIN DESCRIPTION - DESCRIPTORS: DESCRIPTORS: ACHING

## 2025-02-11 NOTE — CARE COORDINATION
2/11/25, 8:34 AM EST    DISCHARGE ON GOING EVALUATION    Elli CHEATHAM [facility] day: 3  Location: 3A-05/005-A Reason for admit: Cellulitis and abscess of trunk [L03.319, L02.219]  Cellulitis [L03.90]     Procedures: none     Imaging since last note:   2/6 CT A/P: Left nephrectomy with drain placement.  Near complete resolution of left retroperitoneal collection. Improvement in stranding about the nephrectomy bed. Stable enlarged, fatty liver.  2/8 CT A/P: Recent left nephrectomy. Pigtail catheter drain in the left perinephric  region/left flank is unchanged in position when compared to prior exam.   Left perinephric fluid collection appears nearly completely resolved and  is unchanged in appearance compared to 02/06/2025. 2. Soft tissue edema within the subcutaneous fat of the left flank around   the surgical drain is more prominent on todays exam than compared to prior  CT likely representing a worsening cellulitis. No soft tissue abscess.    Barriers to Discharge: Hospitalist and ID following. Urology signed off- recommending transfer to tertiary care- patient refusing. PT/OT. Left nephrostomy site culture showing proteus and staph aureus. Culture showing oxacillin resistance for Staph aureus. IV rocephin q24h. SQ lovneox. DM management. Percocet prn. IV vancomycin q12h.     PCP: Ryan Montalvo MD  Readmission Risk Score: 32    Patient Goals/Plan/Treatment Preferences: Return to [facility]. Requires precert. SW to see.

## 2025-02-12 LAB
ANION GAP SERPL CALC-SCNC: 14 MEQ/L (ref 8–16)
BACTERIA UR CULT: ABNORMAL
BACTERIA UR CULT: ABNORMAL
BASOPHILS ABSOLUTE: 0 THOU/MM3 (ref 0–0.1)
BASOPHILS NFR BLD AUTO: 0.8 %
BUN SERPL-MCNC: 20 MG/DL (ref 7–22)
CALCIUM SERPL-MCNC: 10 MG/DL (ref 8.5–10.5)
CHLORIDE SERPL-SCNC: 101 MEQ/L (ref 98–111)
CO2 SERPL-SCNC: 21 MEQ/L (ref 23–33)
CREAT SERPL-MCNC: 0.9 MG/DL (ref 0.4–1.2)
DEPRECATED RDW RBC AUTO: 50.1 FL (ref 35–45)
EOSINOPHIL NFR BLD AUTO: 3.9 %
EOSINOPHILS ABSOLUTE: 0.2 THOU/MM3 (ref 0–0.4)
ERYTHROCYTE [DISTWIDTH] IN BLOOD BY AUTOMATED COUNT: 16.7 % (ref 11.5–14.5)
GFR SERPL CREATININE-BSD FRML MDRD: 70 ML/MIN/1.73M2
GLUCOSE BLD STRIP.AUTO-MCNC: 118 MG/DL (ref 70–108)
GLUCOSE BLD STRIP.AUTO-MCNC: 129 MG/DL (ref 70–108)
GLUCOSE BLD STRIP.AUTO-MCNC: 225 MG/DL (ref 70–108)
GLUCOSE SERPL-MCNC: 108 MG/DL (ref 70–108)
HCT VFR BLD AUTO: 32.5 % (ref 37–47)
HGB BLD-MCNC: 9.9 GM/DL (ref 12–16)
IMM GRANULOCYTES # BLD AUTO: 0.06 THOU/MM3 (ref 0–0.07)
IMM GRANULOCYTES NFR BLD AUTO: 1.2 %
LYMPHOCYTES ABSOLUTE: 2.2 THOU/MM3 (ref 1–4.8)
LYMPHOCYTES NFR BLD AUTO: 42.8 %
MCH RBC QN AUTO: 25.4 PG (ref 26–33)
MCHC RBC AUTO-ENTMCNC: 30.5 GM/DL (ref 32.2–35.5)
MCV RBC AUTO: 83.5 FL (ref 81–99)
MONOCYTES ABSOLUTE: 0.5 THOU/MM3 (ref 0.4–1.3)
MONOCYTES NFR BLD AUTO: 10 %
NEUTROPHILS ABSOLUTE: 2.1 THOU/MM3 (ref 1.8–7.7)
NEUTROPHILS NFR BLD AUTO: 41.3 %
NRBC BLD AUTO-RTO: 0 /100 WBC
ORGANISM: ABNORMAL
ORGANISM: ABNORMAL
PLATELET # BLD AUTO: 310 THOU/MM3 (ref 130–400)
PMV BLD AUTO: 8.8 FL (ref 9.4–12.4)
POTASSIUM SERPL-SCNC: 4.3 MEQ/L (ref 3.5–5.2)
RBC # BLD AUTO: 3.89 MILL/MM3 (ref 4.2–5.4)
SODIUM SERPL-SCNC: 136 MEQ/L (ref 135–145)
VANCOMYCIN SERPL-MCNC: 27.7 UG/ML (ref 10–39.9)
WBC # BLD AUTO: 5.2 THOU/MM3 (ref 4.8–10.8)

## 2025-02-12 PROCEDURE — 6370000000 HC RX 637 (ALT 250 FOR IP)

## 2025-02-12 PROCEDURE — 94640 AIRWAY INHALATION TREATMENT: CPT

## 2025-02-12 PROCEDURE — 82948 REAGENT STRIP/BLOOD GLUCOSE: CPT

## 2025-02-12 PROCEDURE — 6360000002 HC RX W HCPCS

## 2025-02-12 PROCEDURE — 1200000000 HC SEMI PRIVATE

## 2025-02-12 PROCEDURE — 6370000000 HC RX 637 (ALT 250 FOR IP): Performed by: INTERNAL MEDICINE

## 2025-02-12 PROCEDURE — 2500000003 HC RX 250 WO HCPCS

## 2025-02-12 PROCEDURE — 85025 COMPLETE CBC W/AUTO DIFF WBC: CPT

## 2025-02-12 PROCEDURE — 99232 SBSQ HOSP IP/OBS MODERATE 35: CPT | Performed by: INTERNAL MEDICINE

## 2025-02-12 PROCEDURE — 80202 ASSAY OF VANCOMYCIN: CPT

## 2025-02-12 PROCEDURE — 80048 BASIC METABOLIC PNL TOTAL CA: CPT

## 2025-02-12 PROCEDURE — 36415 COLL VENOUS BLD VENIPUNCTURE: CPT

## 2025-02-12 PROCEDURE — 2580000003 HC RX 258

## 2025-02-12 RX ORDER — ALBUTEROL SULFATE 90 UG/1
2 INHALANT RESPIRATORY (INHALATION) EVERY 4 HOURS PRN
Status: DISCONTINUED | OUTPATIENT
Start: 2025-02-12 | End: 2025-02-18 | Stop reason: HOSPADM

## 2025-02-12 RX ORDER — ALBUTEROL SULFATE 90 UG/1
2 INHALANT RESPIRATORY (INHALATION)
Status: DISCONTINUED | OUTPATIENT
Start: 2025-02-13 | End: 2025-02-14

## 2025-02-12 RX ORDER — ALBUTEROL SULFATE 90 UG/1
2 INHALANT RESPIRATORY (INHALATION)
Status: DISCONTINUED | OUTPATIENT
Start: 2025-02-12 | End: 2025-02-12

## 2025-02-12 RX ADMIN — Medication 1250 MG: at 22:28

## 2025-02-12 RX ADMIN — Medication 1250 MG: at 04:30

## 2025-02-12 RX ADMIN — PANTOPRAZOLE SODIUM 40 MG: 40 TABLET, DELAYED RELEASE ORAL at 03:14

## 2025-02-12 RX ADMIN — ENOXAPARIN SODIUM 40 MG: 100 INJECTION SUBCUTANEOUS at 07:32

## 2025-02-12 RX ADMIN — ATORVASTATIN CALCIUM 40 MG: 40 TABLET, FILM COATED ORAL at 19:32

## 2025-02-12 RX ADMIN — OXYCODONE HYDROCHLORIDE AND ACETAMINOPHEN 1 TABLET: 5; 325 TABLET ORAL at 03:14

## 2025-02-12 RX ADMIN — FOLIC ACID 1 MG: 1 TABLET ORAL at 07:32

## 2025-02-12 RX ADMIN — ESCITALOPRAM OXALATE 20 MG: 20 TABLET ORAL at 07:32

## 2025-02-12 RX ADMIN — DIVALPROEX SODIUM 125 MG: 125 CAPSULE ORAL at 21:09

## 2025-02-12 RX ADMIN — DIVALPROEX SODIUM 125 MG: 125 CAPSULE ORAL at 07:32

## 2025-02-12 RX ADMIN — OXYCODONE HYDROCHLORIDE AND ACETAMINOPHEN 1 TABLET: 5; 325 TABLET ORAL at 12:34

## 2025-02-12 RX ADMIN — FENOFIBRATE 160 MG: 160 TABLET ORAL at 07:32

## 2025-02-12 RX ADMIN — AMLODIPINE BESYLATE 10 MG: 10 TABLET ORAL at 07:32

## 2025-02-12 RX ADMIN — PANTOPRAZOLE SODIUM 40 MG: 40 TABLET, DELAYED RELEASE ORAL at 07:32

## 2025-02-12 RX ADMIN — BUPROPION HYDROCHLORIDE 300 MG: 300 TABLET, EXTENDED RELEASE ORAL at 07:32

## 2025-02-12 RX ADMIN — WATER 1000 MG: 1 INJECTION INTRAMUSCULAR; INTRAVENOUS; SUBCUTANEOUS at 03:14

## 2025-02-12 RX ADMIN — INSULIN GLARGINE 10 UNITS: 100 INJECTION, SOLUTION SUBCUTANEOUS at 21:10

## 2025-02-12 RX ADMIN — ALBUTEROL SULFATE 2 PUFF: 90 AEROSOL, METERED RESPIRATORY (INHALATION) at 21:35

## 2025-02-12 RX ADMIN — INSULIN LISPRO 2 UNITS: 100 INJECTION, SOLUTION INTRAVENOUS; SUBCUTANEOUS at 21:10

## 2025-02-12 RX ADMIN — SODIUM CHLORIDE, PRESERVATIVE FREE 10 ML: 5 INJECTION INTRAVENOUS at 19:33

## 2025-02-12 RX ADMIN — OXYCODONE HYDROCHLORIDE AND ACETAMINOPHEN 1 TABLET: 5; 325 TABLET ORAL at 18:42

## 2025-02-12 RX ADMIN — ALBUTEROL SULFATE 2.5 MG: 2.5 SOLUTION RESPIRATORY (INHALATION) at 14:06

## 2025-02-12 RX ADMIN — ROPINIROLE HYDROCHLORIDE 0.5 MG: 0.5 TABLET, FILM COATED ORAL at 19:32

## 2025-02-12 RX ADMIN — PANTOPRAZOLE SODIUM 40 MG: 40 TABLET, DELAYED RELEASE ORAL at 16:14

## 2025-02-12 RX ADMIN — CARVEDILOL 3.12 MG: 3.12 TABLET, FILM COATED ORAL at 19:32

## 2025-02-12 RX ADMIN — TRAZODONE HYDROCHLORIDE 200 MG: 100 TABLET ORAL at 21:14

## 2025-02-12 RX ADMIN — LEVOTHYROXINE SODIUM 75 MCG: 0.07 TABLET ORAL at 03:14

## 2025-02-12 ASSESSMENT — PAIN DESCRIPTION - DESCRIPTORS: DESCRIPTORS: ACHING

## 2025-02-12 ASSESSMENT — PAIN DESCRIPTION - ORIENTATION
ORIENTATION: LEFT
ORIENTATION: LEFT

## 2025-02-12 ASSESSMENT — PAIN SCALES - GENERAL
PAINLEVEL_OUTOF10: 0
PAINLEVEL_OUTOF10: 8

## 2025-02-12 ASSESSMENT — PAIN DESCRIPTION - LOCATION
LOCATION: ABDOMEN
LOCATION: FLANK
LOCATION: FLANK

## 2025-02-12 NOTE — RT PROTOCOL NOTE
RT Inhaler-Nebulizer Bronchodilator Protocol Note    There is a bronchodilator order in the chart from a provider indicating to follow the RT Bronchodilator Protocol and there is an “Initiate RT Inhaler-Nebulizer Bronchodilator Protocol” order as well (see protocol at bottom of note).    CXR Findings:  No results found.    The findings from the last RT Protocol Assessment were as follows:   History Pulmonary Disease: Chronic pulmonary disease  Respiratory Pattern: Mild dyspnea at rest, irregular pattern, or RR 21-25 bpm  Breath Sounds: Intermittent or unilateral wheezes  Cough: Strong, productive  Indication for Bronchodilator Therapy: Wheezing associated with pulm disorder  Bronchodilator Assessment Score: 11    Aerosolized bronchodilator medication orders have been revised according to the RT Inhaler-Nebulizer Bronchodilator Protocol below.    Respiratory Therapist to perform RT Therapy Protocol Assessment initially then follow the protocol.  Repeat RT Therapy Protocol Assessment PRN for score 0-3 or on second treatment, BID, and PRN for scores above 3.    No Indications - adjust the frequency to every 6 hours PRN wheezing or bronchospasm, if no treatments needed after 48 hours then discontinue using Per Protocol order mode.     If indication present, adjust the RT bronchodilator orders based on the Bronchodilator Assessment Score as indicated below.  Use Inhaler orders unless patient has one or more of the following: on home nebulizer, not able to hold breath for 10 seconds, is not alert and oriented, cannot activate and use MDI correctly, or respiratory rate 25 breaths per minute or more, then use the equivalent nebulizer order(s) with same Frequency and PRN reasons based on the score.  If a patient is on this medication at home then do not decrease Frequency below that used at home.    0-3 - enter or revise RT bronchodilator order(s) to equivalent RT Bronchodilator order with Frequency of every 4 hours PRN for

## 2025-02-12 NOTE — CARE COORDINATION
2/12/25, 2:43 PM EST    DISCHARGE ON GOING EVALUATION    Elli CHEATHAM [facility] day: 4  Location: 3A-05/005-A Reason for admit: Cellulitis and abscess of trunk [L03.319, L02.219]  Cellulitis [L03.90]     Procedures: none    Imaging since last note: none     Barriers to Discharge: Hospitalist and ID following. PT/OT- refused PT today. Left nephrostomy site culture showing proteus and staph aureus. Culture showing oxacillin resistance for Staph aureus.Nebulizers/Inhalers. IV rocephin q24h. SQ lovenox. Percocet prn. IV vancomycin.     PCP: Ryan Montalvo MD  Readmission Risk Score: 30.6    Patient Goals/Plan/Treatment Preferences: Return to [facility]. Requires precert. SW to see.     Spoke w/ Dr. Virk; no plans for longterm IV abx at discharge.     Per Dr. Glynn; okay to start precert tomorrow.

## 2025-02-13 LAB
BACTERIA BLD AEROBE CULT: NORMAL
BACTERIA BLD AEROBE CULT: NORMAL
BASOPHILS ABSOLUTE: 0 THOU/MM3 (ref 0–0.1)
BASOPHILS NFR BLD AUTO: 0.7 %
BUN SERPL-MCNC: 19 MG/DL (ref 7–22)
CALCIUM SERPL-MCNC: 10.4 MG/DL (ref 8.5–10.5)
CHLORIDE SERPL-SCNC: 100 MEQ/L (ref 98–111)
CREAT SERPL-MCNC: 0.9 MG/DL (ref 0.4–1.2)
DEPRECATED RDW RBC AUTO: 49.4 FL (ref 35–45)
EOSINOPHIL NFR BLD AUTO: 3.9 %
EOSINOPHILS ABSOLUTE: 0.2 THOU/MM3 (ref 0–0.4)
ERYTHROCYTE [DISTWIDTH] IN BLOOD BY AUTOMATED COUNT: 16.6 % (ref 11.5–14.5)
GFR SERPL CREATININE-BSD FRML MDRD: 70 ML/MIN/1.73M2
GLUCOSE BLD STRIP.AUTO-MCNC: 113 MG/DL (ref 70–108)
GLUCOSE BLD STRIP.AUTO-MCNC: 151 MG/DL (ref 70–108)
GLUCOSE BLD STRIP.AUTO-MCNC: 275 MG/DL (ref 70–108)
GLUCOSE SERPL-MCNC: 116 MG/DL (ref 70–108)
HCT VFR BLD AUTO: 34 % (ref 37–47)
HGB BLD-MCNC: 10.3 GM/DL (ref 12–16)
IMM GRANULOCYTES # BLD AUTO: 0.06 THOU/MM3 (ref 0–0.07)
IMM GRANULOCYTES NFR BLD AUTO: 1.1 %
LYMPHOCYTES ABSOLUTE: 2.3 THOU/MM3 (ref 1–4.8)
LYMPHOCYTES NFR BLD AUTO: 40.3 %
MCH RBC QN AUTO: 25.1 PG (ref 26–33)
MCHC RBC AUTO-ENTMCNC: 30.3 GM/DL (ref 32.2–35.5)
MCV RBC AUTO: 82.9 FL (ref 81–99)
MONOCYTES ABSOLUTE: 0.4 THOU/MM3 (ref 0.4–1.3)
MONOCYTES NFR BLD AUTO: 7.7 %
NEUTROPHILS ABSOLUTE: 2.6 THOU/MM3 (ref 1.8–7.7)
NEUTROPHILS NFR BLD AUTO: 46.3 %
NRBC BLD AUTO-RTO: 0 /100 WBC
PLATELET # BLD AUTO: 371 THOU/MM3 (ref 130–400)
PMV BLD AUTO: 8.5 FL (ref 9.4–12.4)
POTASSIUM SERPL-SCNC: 4.3 MEQ/L (ref 3.5–5.2)
RBC # BLD AUTO: 4.1 MILL/MM3 (ref 4.2–5.4)
SODIUM SERPL-SCNC: 138 MEQ/L (ref 135–145)
WBC # BLD AUTO: 5.6 THOU/MM3 (ref 4.8–10.8)

## 2025-02-13 PROCEDURE — 6370000000 HC RX 637 (ALT 250 FOR IP)

## 2025-02-13 PROCEDURE — 2500000003 HC RX 250 WO HCPCS

## 2025-02-13 PROCEDURE — 6370000000 HC RX 637 (ALT 250 FOR IP): Performed by: INTERNAL MEDICINE

## 2025-02-13 PROCEDURE — 97116 GAIT TRAINING THERAPY: CPT

## 2025-02-13 PROCEDURE — 1200000000 HC SEMI PRIVATE

## 2025-02-13 PROCEDURE — 85025 COMPLETE CBC W/AUTO DIFF WBC: CPT

## 2025-02-13 PROCEDURE — 97535 SELF CARE MNGMENT TRAINING: CPT

## 2025-02-13 PROCEDURE — 6360000002 HC RX W HCPCS

## 2025-02-13 PROCEDURE — 05HB33Z INSERTION OF INFUSION DEVICE INTO RIGHT BASILIC VEIN, PERCUTANEOUS APPROACH: ICD-10-PCS | Performed by: STUDENT IN AN ORGANIZED HEALTH CARE EDUCATION/TRAINING PROGRAM

## 2025-02-13 PROCEDURE — 94761 N-INVAS EAR/PLS OXIMETRY MLT: CPT

## 2025-02-13 PROCEDURE — 80048 BASIC METABOLIC PNL TOTAL CA: CPT

## 2025-02-13 PROCEDURE — 97110 THERAPEUTIC EXERCISES: CPT

## 2025-02-13 PROCEDURE — 97530 THERAPEUTIC ACTIVITIES: CPT

## 2025-02-13 PROCEDURE — 36415 COLL VENOUS BLD VENIPUNCTURE: CPT

## 2025-02-13 PROCEDURE — 99232 SBSQ HOSP IP/OBS MODERATE 35: CPT | Performed by: INTERNAL MEDICINE

## 2025-02-13 PROCEDURE — 94640 AIRWAY INHALATION TREATMENT: CPT

## 2025-02-13 PROCEDURE — 82948 REAGENT STRIP/BLOOD GLUCOSE: CPT

## 2025-02-13 RX ADMIN — PANTOPRAZOLE SODIUM 40 MG: 40 TABLET, DELAYED RELEASE ORAL at 05:47

## 2025-02-13 RX ADMIN — BUPROPION HYDROCHLORIDE 300 MG: 300 TABLET, EXTENDED RELEASE ORAL at 09:39

## 2025-02-13 RX ADMIN — ALBUTEROL SULFATE 2 PUFF: 90 AEROSOL, METERED RESPIRATORY (INHALATION) at 07:51

## 2025-02-13 RX ADMIN — FENOFIBRATE 160 MG: 160 TABLET ORAL at 09:39

## 2025-02-13 RX ADMIN — ESCITALOPRAM OXALATE 20 MG: 20 TABLET ORAL at 09:39

## 2025-02-13 RX ADMIN — LEVOTHYROXINE SODIUM 75 MCG: 0.07 TABLET ORAL at 05:47

## 2025-02-13 RX ADMIN — CARVEDILOL 3.12 MG: 3.12 TABLET, FILM COATED ORAL at 09:39

## 2025-02-13 RX ADMIN — AMLODIPINE BESYLATE 10 MG: 10 TABLET ORAL at 09:39

## 2025-02-13 RX ADMIN — INSULIN LISPRO 2 UNITS: 100 INJECTION, SOLUTION INTRAVENOUS; SUBCUTANEOUS at 21:15

## 2025-02-13 RX ADMIN — OXYCODONE HYDROCHLORIDE AND ACETAMINOPHEN 1 TABLET: 5; 325 TABLET ORAL at 02:19

## 2025-02-13 RX ADMIN — PANTOPRAZOLE SODIUM 40 MG: 40 TABLET, DELAYED RELEASE ORAL at 15:43

## 2025-02-13 RX ADMIN — ENOXAPARIN SODIUM 40 MG: 100 INJECTION SUBCUTANEOUS at 09:39

## 2025-02-13 RX ADMIN — ALBUTEROL SULFATE 2 PUFF: 90 AEROSOL, METERED RESPIRATORY (INHALATION) at 23:38

## 2025-02-13 RX ADMIN — OXYCODONE HYDROCHLORIDE AND ACETAMINOPHEN 1 TABLET: 5; 325 TABLET ORAL at 22:58

## 2025-02-13 RX ADMIN — OXYCODONE HYDROCHLORIDE AND ACETAMINOPHEN 1 TABLET: 5; 325 TABLET ORAL at 09:41

## 2025-02-13 RX ADMIN — ROPINIROLE HYDROCHLORIDE 0.5 MG: 0.5 TABLET, FILM COATED ORAL at 21:20

## 2025-02-13 RX ADMIN — DIVALPROEX SODIUM 125 MG: 125 CAPSULE ORAL at 21:29

## 2025-02-13 RX ADMIN — SODIUM CHLORIDE, PRESERVATIVE FREE 10 ML: 5 INJECTION INTRAVENOUS at 21:15

## 2025-02-13 RX ADMIN — SODIUM CHLORIDE, PRESERVATIVE FREE 10 ML: 5 INJECTION INTRAVENOUS at 09:39

## 2025-02-13 RX ADMIN — WATER 1000 MG: 1 INJECTION INTRAMUSCULAR; INTRAVENOUS; SUBCUTANEOUS at 05:47

## 2025-02-13 RX ADMIN — Medication 1250 MG: at 17:44

## 2025-02-13 RX ADMIN — OXYCODONE HYDROCHLORIDE AND ACETAMINOPHEN 1 TABLET: 5; 325 TABLET ORAL at 15:43

## 2025-02-13 RX ADMIN — ATORVASTATIN CALCIUM 40 MG: 40 TABLET, FILM COATED ORAL at 21:19

## 2025-02-13 RX ADMIN — INSULIN GLARGINE 10 UNITS: 100 INJECTION, SOLUTION SUBCUTANEOUS at 21:15

## 2025-02-13 RX ADMIN — ALBUTEROL SULFATE 2 PUFF: 90 AEROSOL, METERED RESPIRATORY (INHALATION) at 15:00

## 2025-02-13 RX ADMIN — CARVEDILOL 3.12 MG: 3.12 TABLET, FILM COATED ORAL at 21:19

## 2025-02-13 RX ADMIN — DIVALPROEX SODIUM 125 MG: 125 CAPSULE ORAL at 09:38

## 2025-02-13 RX ADMIN — TRAZODONE HYDROCHLORIDE 200 MG: 100 TABLET ORAL at 21:15

## 2025-02-13 ASSESSMENT — PAIN SCALES - GENERAL
PAINLEVEL_OUTOF10: 10
PAINLEVEL_OUTOF10: 7
PAINLEVEL_OUTOF10: 3
PAINLEVEL_OUTOF10: 8
PAINLEVEL_OUTOF10: 6
PAINLEVEL_OUTOF10: 5
PAINLEVEL_OUTOF10: 7
PAINLEVEL_OUTOF10: 7
PAINLEVEL_OUTOF10: 0

## 2025-02-13 ASSESSMENT — PAIN DESCRIPTION - LOCATION
LOCATION: FLANK
LOCATION: ABDOMEN
LOCATION: ABDOMEN;FLANK

## 2025-02-13 ASSESSMENT — PAIN DESCRIPTION - ORIENTATION
ORIENTATION: LEFT

## 2025-02-13 ASSESSMENT — PAIN DESCRIPTION - DESCRIPTORS
DESCRIPTORS: ACHING
DESCRIPTORS: ACHING
DESCRIPTORS: ACHING;DISCOMFORT
DESCRIPTORS: ACHING;DISCOMFORT
DESCRIPTORS: ACHING

## 2025-02-13 ASSESSMENT — PAIN DESCRIPTION - FREQUENCY
FREQUENCY: CONTINUOUS
FREQUENCY: CONTINUOUS

## 2025-02-13 ASSESSMENT — PAIN - FUNCTIONAL ASSESSMENT
PAIN_FUNCTIONAL_ASSESSMENT: ACTIVITIES ARE NOT PREVENTED

## 2025-02-13 ASSESSMENT — PAIN DESCRIPTION - ONSET
ONSET: ON-GOING
ONSET: ON-GOING

## 2025-02-13 ASSESSMENT — PAIN DESCRIPTION - PAIN TYPE
TYPE: ACUTE PAIN
TYPE: SURGICAL PAIN

## 2025-02-13 ASSESSMENT — PAIN SCALES - WONG BAKER
WONGBAKER_NUMERICALRESPONSE: HURTS LITTLE MORE
WONGBAKER_NUMERICALRESPONSE: HURTS A LITTLE BIT
WONGBAKER_NUMERICALRESPONSE: NO HURT

## 2025-02-13 NOTE — CARE COORDINATION
2/13/25, 2:54 PM EST    DISCHARGE ON GOING EVALUATION    Elli CHEATHAM [facility] day: 5  Location: 3A-05/005-A Reason for admit: Cellulitis and abscess of trunk [L03.319, L02.219]  Cellulitis [L03.90]     Procedures: none    Imaging since last note: none    Barriers to Discharge: Hospitalist and ID following. PT/OT- precert started today. Left nephrostomy site culture showing proteus and staph aureus. Culture showing oxacillin resistance for Staph aureus. IV rocephin q24h. IV vancomycin q18h- transition to PO at dc. Nebulizers/Inhalers.     PCP: Ryan Montalvo MD  Readmission Risk Score: 29.8    Patient Goals/Plan/Treatment Preferences: Return to [facility]. Precert started today. SW following.

## 2025-02-14 LAB
GLUCOSE BLD STRIP.AUTO-MCNC: 118 MG/DL (ref 70–108)
GLUCOSE BLD STRIP.AUTO-MCNC: 118 MG/DL (ref 70–108)
GLUCOSE BLD STRIP.AUTO-MCNC: 133 MG/DL (ref 70–108)
GLUCOSE BLD STRIP.AUTO-MCNC: 192 MG/DL (ref 70–108)
VANCOMYCIN SERPL-MCNC: 15.7 UG/ML (ref 10–39.9)

## 2025-02-14 PROCEDURE — 1200000000 HC SEMI PRIVATE

## 2025-02-14 PROCEDURE — 6370000000 HC RX 637 (ALT 250 FOR IP): Performed by: INTERNAL MEDICINE

## 2025-02-14 PROCEDURE — 6370000000 HC RX 637 (ALT 250 FOR IP)

## 2025-02-14 PROCEDURE — 99232 SBSQ HOSP IP/OBS MODERATE 35: CPT | Performed by: INTERNAL MEDICINE

## 2025-02-14 PROCEDURE — 6360000002 HC RX W HCPCS

## 2025-02-14 PROCEDURE — 2500000003 HC RX 250 WO HCPCS

## 2025-02-14 PROCEDURE — 94761 N-INVAS EAR/PLS OXIMETRY MLT: CPT

## 2025-02-14 PROCEDURE — 94640 AIRWAY INHALATION TREATMENT: CPT

## 2025-02-14 PROCEDURE — 82948 REAGENT STRIP/BLOOD GLUCOSE: CPT

## 2025-02-14 PROCEDURE — 36415 COLL VENOUS BLD VENIPUNCTURE: CPT

## 2025-02-14 PROCEDURE — 80202 ASSAY OF VANCOMYCIN: CPT

## 2025-02-14 RX ORDER — ALBUTEROL SULFATE 90 UG/1
2 INHALANT RESPIRATORY (INHALATION)
Status: DISCONTINUED | OUTPATIENT
Start: 2025-02-15 | End: 2025-02-16

## 2025-02-14 RX ORDER — GUAIFENESIN/DEXTROMETHORPHAN 100-10MG/5
5 SYRUP ORAL EVERY 4 HOURS PRN
Status: DISCONTINUED | OUTPATIENT
Start: 2025-02-14 | End: 2025-02-18 | Stop reason: HOSPADM

## 2025-02-14 RX ORDER — IPRATROPIUM BROMIDE AND ALBUTEROL SULFATE 2.5; .5 MG/3ML; MG/3ML
1 SOLUTION RESPIRATORY (INHALATION) ONCE
Status: COMPLETED | OUTPATIENT
Start: 2025-02-14 | End: 2025-02-14

## 2025-02-14 RX ORDER — GUAIFENESIN 600 MG/1
600 TABLET, EXTENDED RELEASE ORAL 2 TIMES DAILY
Status: DISCONTINUED | OUTPATIENT
Start: 2025-02-14 | End: 2025-02-14

## 2025-02-14 RX ADMIN — IPRATROPIUM BROMIDE AND ALBUTEROL SULFATE 1 DOSE: .5; 3 SOLUTION RESPIRATORY (INHALATION) at 01:43

## 2025-02-14 RX ADMIN — OXYCODONE HYDROCHLORIDE AND ACETAMINOPHEN 1 TABLET: 5; 325 TABLET ORAL at 18:36

## 2025-02-14 RX ADMIN — AMLODIPINE BESYLATE 10 MG: 10 TABLET ORAL at 10:37

## 2025-02-14 RX ADMIN — DIVALPROEX SODIUM 125 MG: 125 CAPSULE ORAL at 08:44

## 2025-02-14 RX ADMIN — ESCITALOPRAM OXALATE 20 MG: 20 TABLET ORAL at 08:44

## 2025-02-14 RX ADMIN — GUAIFENESIN 600 MG: 600 TABLET ORAL at 08:44

## 2025-02-14 RX ADMIN — GUAIFENESIN 600 MG: 600 TABLET ORAL at 01:26

## 2025-02-14 RX ADMIN — ALBUTEROL SULFATE 2 PUFF: 90 AEROSOL, METERED RESPIRATORY (INHALATION) at 09:11

## 2025-02-14 RX ADMIN — PANTOPRAZOLE SODIUM 40 MG: 40 TABLET, DELAYED RELEASE ORAL at 17:11

## 2025-02-14 RX ADMIN — ATORVASTATIN CALCIUM 40 MG: 40 TABLET, FILM COATED ORAL at 20:48

## 2025-02-14 RX ADMIN — TRAZODONE HYDROCHLORIDE 200 MG: 100 TABLET ORAL at 20:49

## 2025-02-14 RX ADMIN — INSULIN LISPRO 1 UNITS: 100 INJECTION, SOLUTION INTRAVENOUS; SUBCUTANEOUS at 17:11

## 2025-02-14 RX ADMIN — ALBUTEROL SULFATE 2 PUFF: 90 AEROSOL, METERED RESPIRATORY (INHALATION) at 20:29

## 2025-02-14 RX ADMIN — PANTOPRAZOLE SODIUM 40 MG: 40 TABLET, DELAYED RELEASE ORAL at 05:35

## 2025-02-14 RX ADMIN — OXYCODONE HYDROCHLORIDE AND ACETAMINOPHEN 1 TABLET: 5; 325 TABLET ORAL at 05:35

## 2025-02-14 RX ADMIN — SODIUM CHLORIDE, PRESERVATIVE FREE 10 ML: 5 INJECTION INTRAVENOUS at 20:50

## 2025-02-14 RX ADMIN — Medication 1250 MG: at 12:02

## 2025-02-14 RX ADMIN — ENOXAPARIN SODIUM 40 MG: 100 INJECTION SUBCUTANEOUS at 08:44

## 2025-02-14 RX ADMIN — DIVALPROEX SODIUM 125 MG: 125 CAPSULE ORAL at 20:48

## 2025-02-14 RX ADMIN — WATER 1000 MG: 1 INJECTION INTRAMUSCULAR; INTRAVENOUS; SUBCUTANEOUS at 05:35

## 2025-02-14 RX ADMIN — INSULIN GLARGINE 10 UNITS: 100 INJECTION, SOLUTION SUBCUTANEOUS at 20:46

## 2025-02-14 RX ADMIN — FENOFIBRATE 160 MG: 160 TABLET ORAL at 08:44

## 2025-02-14 RX ADMIN — ALBUTEROL SULFATE 2 PUFF: 90 AEROSOL, METERED RESPIRATORY (INHALATION) at 13:59

## 2025-02-14 RX ADMIN — SODIUM CHLORIDE, PRESERVATIVE FREE 10 ML: 5 INJECTION INTRAVENOUS at 08:44

## 2025-02-14 RX ADMIN — GUAIFENESIN SYRUP AND DEXTROMETHORPHAN 5 ML: 100; 10 SYRUP ORAL at 20:46

## 2025-02-14 RX ADMIN — LEVOTHYROXINE SODIUM 75 MCG: 0.07 TABLET ORAL at 10:36

## 2025-02-14 RX ADMIN — CARVEDILOL 3.12 MG: 3.12 TABLET, FILM COATED ORAL at 20:48

## 2025-02-14 RX ADMIN — BUPROPION HYDROCHLORIDE 300 MG: 300 TABLET, EXTENDED RELEASE ORAL at 08:44

## 2025-02-14 RX ADMIN — OXYCODONE HYDROCHLORIDE AND ACETAMINOPHEN 1 TABLET: 5; 325 TABLET ORAL at 12:03

## 2025-02-14 RX ADMIN — GUAIFENESIN SYRUP AND DEXTROMETHORPHAN 5 ML: 100; 10 SYRUP ORAL at 13:30

## 2025-02-14 RX ADMIN — ROPINIROLE HYDROCHLORIDE 0.5 MG: 0.5 TABLET, FILM COATED ORAL at 20:47

## 2025-02-14 ASSESSMENT — PAIN DESCRIPTION - ORIENTATION
ORIENTATION: LEFT
ORIENTATION: LEFT

## 2025-02-14 ASSESSMENT — PAIN SCALES - GENERAL
PAINLEVEL_OUTOF10: 7
PAINLEVEL_OUTOF10: 8

## 2025-02-14 ASSESSMENT — PAIN - FUNCTIONAL ASSESSMENT
PAIN_FUNCTIONAL_ASSESSMENT: 0-10
PAIN_FUNCTIONAL_ASSESSMENT: ACTIVITIES ARE NOT PREVENTED
PAIN_FUNCTIONAL_ASSESSMENT: ACTIVITIES ARE NOT PREVENTED

## 2025-02-14 ASSESSMENT — PAIN DESCRIPTION - DESCRIPTORS
DESCRIPTORS: SHARP
DESCRIPTORS: DISCOMFORT;ACHING
DESCRIPTORS: ACHING

## 2025-02-14 ASSESSMENT — PAIN DESCRIPTION - LOCATION
LOCATION: FLANK
LOCATION: FLANK

## 2025-02-14 NOTE — CARE COORDINATION
2/14/25, 7:20 AM EST    DISCHARGE ON GOING EVALUATION    Transferred to [facility]; Report given to Hellen .     Electronically signed by Cristy Mack RN on 2/14/2025 at 7:20 AM

## 2025-02-14 NOTE — CARE COORDINATION
2/14/25, 7:11 AM EST    DISCHARGE BARRIERS        Patient transferred to . Report given to unit SW, Kimmy, regarding discharge plan for this patient.

## 2025-02-15 LAB
GLUCOSE BLD STRIP.AUTO-MCNC: 109 MG/DL (ref 70–108)
GLUCOSE BLD STRIP.AUTO-MCNC: 151 MG/DL (ref 70–108)
GLUCOSE BLD STRIP.AUTO-MCNC: 177 MG/DL (ref 70–108)

## 2025-02-15 PROCEDURE — 99232 SBSQ HOSP IP/OBS MODERATE 35: CPT | Performed by: INTERNAL MEDICINE

## 2025-02-15 PROCEDURE — 2500000003 HC RX 250 WO HCPCS

## 2025-02-15 PROCEDURE — 6370000000 HC RX 637 (ALT 250 FOR IP): Performed by: INTERNAL MEDICINE

## 2025-02-15 PROCEDURE — 6370000000 HC RX 637 (ALT 250 FOR IP)

## 2025-02-15 PROCEDURE — 6360000002 HC RX W HCPCS

## 2025-02-15 PROCEDURE — 94760 N-INVAS EAR/PLS OXIMETRY 1: CPT

## 2025-02-15 PROCEDURE — 82948 REAGENT STRIP/BLOOD GLUCOSE: CPT

## 2025-02-15 PROCEDURE — 2580000003 HC RX 258

## 2025-02-15 PROCEDURE — 1200000000 HC SEMI PRIVATE

## 2025-02-15 PROCEDURE — 94640 AIRWAY INHALATION TREATMENT: CPT

## 2025-02-15 RX ORDER — DOXYCYCLINE HYCLATE 100 MG
100 TABLET ORAL EVERY 12 HOURS SCHEDULED
Status: DISCONTINUED | OUTPATIENT
Start: 2025-02-15 | End: 2025-02-18 | Stop reason: HOSPADM

## 2025-02-15 RX ORDER — AMOXICILLIN AND CLAVULANATE POTASSIUM 500; 125 MG/1; MG/1
1 TABLET, FILM COATED ORAL EVERY 8 HOURS SCHEDULED
Status: DISCONTINUED | OUTPATIENT
Start: 2025-02-15 | End: 2025-02-18 | Stop reason: HOSPADM

## 2025-02-15 RX ADMIN — GUAIFENESIN SYRUP AND DEXTROMETHORPHAN 5 ML: 100; 10 SYRUP ORAL at 05:36

## 2025-02-15 RX ADMIN — DIVALPROEX SODIUM 125 MG: 125 CAPSULE ORAL at 08:22

## 2025-02-15 RX ADMIN — ALBUTEROL SULFATE 2 PUFF: 90 AEROSOL, METERED RESPIRATORY (INHALATION) at 09:52

## 2025-02-15 RX ADMIN — BUPROPION HYDROCHLORIDE 300 MG: 300 TABLET, EXTENDED RELEASE ORAL at 08:22

## 2025-02-15 RX ADMIN — INSULIN GLARGINE 10 UNITS: 100 INJECTION, SOLUTION SUBCUTANEOUS at 20:57

## 2025-02-15 RX ADMIN — PANTOPRAZOLE SODIUM 40 MG: 40 TABLET, DELAYED RELEASE ORAL at 05:18

## 2025-02-15 RX ADMIN — ESCITALOPRAM OXALATE 20 MG: 20 TABLET ORAL at 08:22

## 2025-02-15 RX ADMIN — PANTOPRAZOLE SODIUM 40 MG: 40 TABLET, DELAYED RELEASE ORAL at 16:31

## 2025-02-15 RX ADMIN — CARVEDILOL 3.12 MG: 3.12 TABLET, FILM COATED ORAL at 20:58

## 2025-02-15 RX ADMIN — OXYCODONE HYDROCHLORIDE AND ACETAMINOPHEN 1 TABLET: 5; 325 TABLET ORAL at 20:57

## 2025-02-15 RX ADMIN — ALBUTEROL SULFATE 2 PUFF: 90 AEROSOL, METERED RESPIRATORY (INHALATION) at 16:14

## 2025-02-15 RX ADMIN — ENOXAPARIN SODIUM 40 MG: 100 INJECTION SUBCUTANEOUS at 08:22

## 2025-02-15 RX ADMIN — OXYCODONE HYDROCHLORIDE AND ACETAMINOPHEN 1 TABLET: 5; 325 TABLET ORAL at 05:36

## 2025-02-15 RX ADMIN — ATORVASTATIN CALCIUM 40 MG: 40 TABLET, FILM COATED ORAL at 20:58

## 2025-02-15 RX ADMIN — OXYCODONE HYDROCHLORIDE AND ACETAMINOPHEN 1 TABLET: 5; 325 TABLET ORAL at 13:58

## 2025-02-15 RX ADMIN — DIVALPROEX SODIUM 125 MG: 125 CAPSULE ORAL at 20:58

## 2025-02-15 RX ADMIN — TRAZODONE HYDROCHLORIDE 200 MG: 100 TABLET ORAL at 20:57

## 2025-02-15 RX ADMIN — ROPINIROLE HYDROCHLORIDE 0.5 MG: 0.5 TABLET, FILM COATED ORAL at 20:58

## 2025-02-15 RX ADMIN — SODIUM CHLORIDE, PRESERVATIVE FREE 10 ML: 5 INJECTION INTRAVENOUS at 08:22

## 2025-02-15 RX ADMIN — AMOXICILLIN AND CLAVULANATE POTASSIUM 1 TABLET: 500; 125 TABLET, FILM COATED ORAL at 22:08

## 2025-02-15 RX ADMIN — Medication 1250 MG: at 05:21

## 2025-02-15 RX ADMIN — AMLODIPINE BESYLATE 10 MG: 10 TABLET ORAL at 08:22

## 2025-02-15 RX ADMIN — SODIUM CHLORIDE, PRESERVATIVE FREE 10 ML: 5 INJECTION INTRAVENOUS at 20:59

## 2025-02-15 RX ADMIN — AMOXICILLIN AND CLAVULANATE POTASSIUM 1 TABLET: 500; 125 TABLET, FILM COATED ORAL at 13:59

## 2025-02-15 RX ADMIN — DOXYCYCLINE HYCLATE 100 MG: 100 TABLET, COATED ORAL at 22:08

## 2025-02-15 RX ADMIN — FENOFIBRATE 160 MG: 160 TABLET ORAL at 08:22

## 2025-02-15 RX ADMIN — LEVOTHYROXINE SODIUM 75 MCG: 0.07 TABLET ORAL at 05:18

## 2025-02-15 RX ADMIN — GUAIFENESIN SYRUP AND DEXTROMETHORPHAN 5 ML: 100; 10 SYRUP ORAL at 14:07

## 2025-02-15 ASSESSMENT — PAIN DESCRIPTION - LOCATION
LOCATION: OTHER (COMMENT)
LOCATION: RIB CAGE;OTHER (COMMENT)

## 2025-02-15 ASSESSMENT — PAIN SCALES - GENERAL
PAINLEVEL_OUTOF10: 8
PAINLEVEL_OUTOF10: 8
PAINLEVEL_OUTOF10: 4
PAINLEVEL_OUTOF10: 3
PAINLEVEL_OUTOF10: 8

## 2025-02-15 ASSESSMENT — PAIN - FUNCTIONAL ASSESSMENT: PAIN_FUNCTIONAL_ASSESSMENT: ACTIVITIES ARE NOT PREVENTED

## 2025-02-15 ASSESSMENT — PAIN DESCRIPTION - DESCRIPTORS
DESCRIPTORS: ACHING;SHARP
DESCRIPTORS: ACHING

## 2025-02-16 LAB
BASOPHILS ABSOLUTE: 0.1 THOU/MM3 (ref 0–0.1)
BASOPHILS NFR BLD AUTO: 1 %
DEPRECATED RDW RBC AUTO: 52.9 FL (ref 35–45)
EOSINOPHIL NFR BLD AUTO: 4.5 %
EOSINOPHILS ABSOLUTE: 0.3 THOU/MM3 (ref 0–0.4)
ERYTHROCYTE [DISTWIDTH] IN BLOOD BY AUTOMATED COUNT: 17.3 % (ref 11.5–14.5)
GLUCOSE BLD STRIP.AUTO-MCNC: 116 MG/DL (ref 70–108)
GLUCOSE BLD STRIP.AUTO-MCNC: 156 MG/DL (ref 70–108)
GLUCOSE BLD STRIP.AUTO-MCNC: 222 MG/DL (ref 70–108)
GLUCOSE BLD STRIP.AUTO-MCNC: 87 MG/DL (ref 70–108)
HCT VFR BLD AUTO: 36.2 % (ref 37–47)
HGB BLD-MCNC: 10.8 GM/DL (ref 12–16)
IMM GRANULOCYTES # BLD AUTO: 0.05 THOU/MM3 (ref 0–0.07)
IMM GRANULOCYTES NFR BLD AUTO: 0.8 %
LYMPHOCYTES ABSOLUTE: 2.3 THOU/MM3 (ref 1–4.8)
LYMPHOCYTES NFR BLD AUTO: 39.1 %
MCH RBC QN AUTO: 25.7 PG (ref 26–33)
MCHC RBC AUTO-ENTMCNC: 29.8 GM/DL (ref 32.2–35.5)
MCV RBC AUTO: 86 FL (ref 81–99)
MONOCYTES ABSOLUTE: 0.5 THOU/MM3 (ref 0.4–1.3)
MONOCYTES NFR BLD AUTO: 7.8 %
NEUTROPHILS ABSOLUTE: 2.8 THOU/MM3 (ref 1.8–7.7)
NEUTROPHILS NFR BLD AUTO: 46.8 %
NRBC BLD AUTO-RTO: 0 /100 WBC
PLATELET # BLD AUTO: 327 THOU/MM3 (ref 130–400)
PMV BLD AUTO: 8.5 FL (ref 9.4–12.4)
RBC # BLD AUTO: 4.21 MILL/MM3 (ref 4.2–5.4)
WBC # BLD AUTO: 6 THOU/MM3 (ref 4.8–10.8)

## 2025-02-16 PROCEDURE — 2500000003 HC RX 250 WO HCPCS

## 2025-02-16 PROCEDURE — 6370000000 HC RX 637 (ALT 250 FOR IP): Performed by: INTERNAL MEDICINE

## 2025-02-16 PROCEDURE — 36415 COLL VENOUS BLD VENIPUNCTURE: CPT

## 2025-02-16 PROCEDURE — 6360000002 HC RX W HCPCS

## 2025-02-16 PROCEDURE — 82948 REAGENT STRIP/BLOOD GLUCOSE: CPT

## 2025-02-16 PROCEDURE — 99232 SBSQ HOSP IP/OBS MODERATE 35: CPT | Performed by: INTERNAL MEDICINE

## 2025-02-16 PROCEDURE — 6370000000 HC RX 637 (ALT 250 FOR IP)

## 2025-02-16 PROCEDURE — 1200000000 HC SEMI PRIVATE

## 2025-02-16 PROCEDURE — 85025 COMPLETE CBC W/AUTO DIFF WBC: CPT

## 2025-02-16 PROCEDURE — 94640 AIRWAY INHALATION TREATMENT: CPT

## 2025-02-16 RX ORDER — ALBUTEROL SULFATE 90 UG/1
2 INHALANT RESPIRATORY (INHALATION) 3 TIMES DAILY
Status: DISCONTINUED | OUTPATIENT
Start: 2025-02-17 | End: 2025-02-18 | Stop reason: HOSPADM

## 2025-02-16 RX ADMIN — CARVEDILOL 3.12 MG: 3.12 TABLET, FILM COATED ORAL at 09:57

## 2025-02-16 RX ADMIN — AMOXICILLIN AND CLAVULANATE POTASSIUM 1 TABLET: 500; 125 TABLET, FILM COATED ORAL at 16:28

## 2025-02-16 RX ADMIN — ALBUTEROL SULFATE 2 PUFF: 90 AEROSOL, METERED RESPIRATORY (INHALATION) at 22:22

## 2025-02-16 RX ADMIN — SODIUM CHLORIDE, PRESERVATIVE FREE 10 ML: 5 INJECTION INTRAVENOUS at 09:57

## 2025-02-16 RX ADMIN — GUAIFENESIN SYRUP AND DEXTROMETHORPHAN 5 ML: 100; 10 SYRUP ORAL at 21:02

## 2025-02-16 RX ADMIN — OXYCODONE HYDROCHLORIDE AND ACETAMINOPHEN 1 TABLET: 5; 325 TABLET ORAL at 10:01

## 2025-02-16 RX ADMIN — TRAZODONE HYDROCHLORIDE 200 MG: 100 TABLET ORAL at 21:02

## 2025-02-16 RX ADMIN — MICONAZOLE NITRATE: 2 POWDER TOPICAL at 23:46

## 2025-02-16 RX ADMIN — SODIUM CHLORIDE, PRESERVATIVE FREE 10 ML: 5 INJECTION INTRAVENOUS at 21:10

## 2025-02-16 RX ADMIN — PANTOPRAZOLE SODIUM 40 MG: 40 TABLET, DELAYED RELEASE ORAL at 16:28

## 2025-02-16 RX ADMIN — OXYCODONE HYDROCHLORIDE AND ACETAMINOPHEN 1 TABLET: 5; 325 TABLET ORAL at 03:28

## 2025-02-16 RX ADMIN — LEVOTHYROXINE SODIUM 150 MCG: 0.15 TABLET ORAL at 05:55

## 2025-02-16 RX ADMIN — CARVEDILOL 3.12 MG: 3.12 TABLET, FILM COATED ORAL at 21:03

## 2025-02-16 RX ADMIN — AMLODIPINE BESYLATE 10 MG: 10 TABLET ORAL at 09:57

## 2025-02-16 RX ADMIN — ALBUTEROL SULFATE 2 PUFF: 90 AEROSOL, METERED RESPIRATORY (INHALATION) at 09:48

## 2025-02-16 RX ADMIN — ATORVASTATIN CALCIUM 40 MG: 40 TABLET, FILM COATED ORAL at 21:03

## 2025-02-16 RX ADMIN — INSULIN LISPRO 1 UNITS: 100 INJECTION, SOLUTION INTRAVENOUS; SUBCUTANEOUS at 12:36

## 2025-02-16 RX ADMIN — ENOXAPARIN SODIUM 40 MG: 100 INJECTION SUBCUTANEOUS at 09:58

## 2025-02-16 RX ADMIN — DOXYCYCLINE HYCLATE 100 MG: 100 TABLET, COATED ORAL at 09:57

## 2025-02-16 RX ADMIN — DIVALPROEX SODIUM 125 MG: 125 CAPSULE ORAL at 09:58

## 2025-02-16 RX ADMIN — PANTOPRAZOLE SODIUM 40 MG: 40 TABLET, DELAYED RELEASE ORAL at 05:55

## 2025-02-16 RX ADMIN — BUPROPION HYDROCHLORIDE 300 MG: 300 TABLET, EXTENDED RELEASE ORAL at 09:57

## 2025-02-16 RX ADMIN — ROPINIROLE HYDROCHLORIDE 0.5 MG: 0.5 TABLET, FILM COATED ORAL at 21:03

## 2025-02-16 RX ADMIN — INSULIN GLARGINE 10 UNITS: 100 INJECTION, SOLUTION SUBCUTANEOUS at 21:02

## 2025-02-16 RX ADMIN — AMOXICILLIN AND CLAVULANATE POTASSIUM 1 TABLET: 500; 125 TABLET, FILM COATED ORAL at 21:03

## 2025-02-16 RX ADMIN — AMOXICILLIN AND CLAVULANATE POTASSIUM 1 TABLET: 500; 125 TABLET, FILM COATED ORAL at 05:55

## 2025-02-16 RX ADMIN — OXYCODONE HYDROCHLORIDE AND ACETAMINOPHEN 1 TABLET: 5; 325 TABLET ORAL at 23:46

## 2025-02-16 RX ADMIN — DIVALPROEX SODIUM 125 MG: 125 CAPSULE ORAL at 21:03

## 2025-02-16 RX ADMIN — DOXYCYCLINE HYCLATE 100 MG: 100 TABLET, COATED ORAL at 21:03

## 2025-02-16 RX ADMIN — ESCITALOPRAM OXALATE 20 MG: 20 TABLET ORAL at 09:57

## 2025-02-16 RX ADMIN — FENOFIBRATE 160 MG: 160 TABLET ORAL at 09:57

## 2025-02-16 RX ADMIN — OXYCODONE HYDROCHLORIDE AND ACETAMINOPHEN 1 TABLET: 5; 325 TABLET ORAL at 16:28

## 2025-02-16 ASSESSMENT — PAIN SCALES - GENERAL
PAINLEVEL_OUTOF10: 7
PAINLEVEL_OUTOF10: 8

## 2025-02-16 ASSESSMENT — PAIN DESCRIPTION - ORIENTATION
ORIENTATION: LEFT

## 2025-02-16 ASSESSMENT — PAIN DESCRIPTION - LOCATION
LOCATION: ABDOMEN

## 2025-02-16 ASSESSMENT — PAIN DESCRIPTION - DESCRIPTORS
DESCRIPTORS: ACHING

## 2025-02-16 ASSESSMENT — PAIN - FUNCTIONAL ASSESSMENT: PAIN_FUNCTIONAL_ASSESSMENT: ACTIVITIES ARE NOT PREVENTED

## 2025-02-17 LAB
GLUCOSE BLD STRIP.AUTO-MCNC: 110 MG/DL (ref 70–108)
GLUCOSE BLD STRIP.AUTO-MCNC: 113 MG/DL (ref 70–108)
GLUCOSE BLD STRIP.AUTO-MCNC: 157 MG/DL (ref 70–108)
GLUCOSE BLD STRIP.AUTO-MCNC: 191 MG/DL (ref 70–108)

## 2025-02-17 PROCEDURE — NBSRV NON-BILLABLE SERVICE: Performed by: STUDENT IN AN ORGANIZED HEALTH CARE EDUCATION/TRAINING PROGRAM

## 2025-02-17 PROCEDURE — 6370000000 HC RX 637 (ALT 250 FOR IP): Performed by: INTERNAL MEDICINE

## 2025-02-17 PROCEDURE — 99232 SBSQ HOSP IP/OBS MODERATE 35: CPT | Performed by: STUDENT IN AN ORGANIZED HEALTH CARE EDUCATION/TRAINING PROGRAM

## 2025-02-17 PROCEDURE — 97535 SELF CARE MNGMENT TRAINING: CPT

## 2025-02-17 PROCEDURE — 1200000000 HC SEMI PRIVATE

## 2025-02-17 PROCEDURE — 94640 AIRWAY INHALATION TREATMENT: CPT

## 2025-02-17 PROCEDURE — 97110 THERAPEUTIC EXERCISES: CPT

## 2025-02-17 PROCEDURE — 6370000000 HC RX 637 (ALT 250 FOR IP)

## 2025-02-17 PROCEDURE — 82948 REAGENT STRIP/BLOOD GLUCOSE: CPT

## 2025-02-17 PROCEDURE — 97530 THERAPEUTIC ACTIVITIES: CPT

## 2025-02-17 PROCEDURE — 6360000002 HC RX W HCPCS

## 2025-02-17 PROCEDURE — 2500000003 HC RX 250 WO HCPCS

## 2025-02-17 RX ADMIN — BUPROPION HYDROCHLORIDE 300 MG: 300 TABLET, EXTENDED RELEASE ORAL at 10:51

## 2025-02-17 RX ADMIN — CARVEDILOL 3.12 MG: 3.12 TABLET, FILM COATED ORAL at 20:47

## 2025-02-17 RX ADMIN — TRAZODONE HYDROCHLORIDE 200 MG: 100 TABLET ORAL at 20:46

## 2025-02-17 RX ADMIN — DIVALPROEX SODIUM 125 MG: 125 CAPSULE ORAL at 20:48

## 2025-02-17 RX ADMIN — AMOXICILLIN AND CLAVULANATE POTASSIUM 1 TABLET: 500; 125 TABLET, FILM COATED ORAL at 04:20

## 2025-02-17 RX ADMIN — DIVALPROEX SODIUM 125 MG: 125 CAPSULE ORAL at 09:36

## 2025-02-17 RX ADMIN — PANTOPRAZOLE SODIUM 40 MG: 40 TABLET, DELAYED RELEASE ORAL at 16:05

## 2025-02-17 RX ADMIN — PANTOPRAZOLE SODIUM 40 MG: 40 TABLET, DELAYED RELEASE ORAL at 05:05

## 2025-02-17 RX ADMIN — OXYCODONE HYDROCHLORIDE AND ACETAMINOPHEN 1 TABLET: 5; 325 TABLET ORAL at 07:32

## 2025-02-17 RX ADMIN — INSULIN GLARGINE 10 UNITS: 100 INJECTION, SOLUTION SUBCUTANEOUS at 20:47

## 2025-02-17 RX ADMIN — AMLODIPINE BESYLATE 10 MG: 10 TABLET ORAL at 09:35

## 2025-02-17 RX ADMIN — LEVOTHYROXINE SODIUM 75 MCG: 0.07 TABLET ORAL at 05:05

## 2025-02-17 RX ADMIN — FENOFIBRATE 160 MG: 160 TABLET ORAL at 09:35

## 2025-02-17 RX ADMIN — ALBUTEROL SULFATE 2 PUFF: 90 AEROSOL, METERED RESPIRATORY (INHALATION) at 22:36

## 2025-02-17 RX ADMIN — CARVEDILOL 3.12 MG: 3.12 TABLET, FILM COATED ORAL at 09:35

## 2025-02-17 RX ADMIN — MICONAZOLE NITRATE: 2 POWDER TOPICAL at 20:48

## 2025-02-17 RX ADMIN — OXYCODONE HYDROCHLORIDE AND ACETAMINOPHEN 1 TABLET: 5; 325 TABLET ORAL at 13:25

## 2025-02-17 RX ADMIN — SODIUM CHLORIDE, PRESERVATIVE FREE 10 ML: 5 INJECTION INTRAVENOUS at 09:38

## 2025-02-17 RX ADMIN — INSULIN LISPRO 1 UNITS: 100 INJECTION, SOLUTION INTRAVENOUS; SUBCUTANEOUS at 21:25

## 2025-02-17 RX ADMIN — SODIUM CHLORIDE, PRESERVATIVE FREE 10 ML: 5 INJECTION INTRAVENOUS at 20:48

## 2025-02-17 RX ADMIN — DOXYCYCLINE HYCLATE 100 MG: 100 TABLET, COATED ORAL at 20:48

## 2025-02-17 RX ADMIN — ROPINIROLE HYDROCHLORIDE 0.5 MG: 0.5 TABLET, FILM COATED ORAL at 20:47

## 2025-02-17 RX ADMIN — AMOXICILLIN AND CLAVULANATE POTASSIUM 1 TABLET: 500; 125 TABLET, FILM COATED ORAL at 20:47

## 2025-02-17 RX ADMIN — ALBUTEROL SULFATE 2 PUFF: 90 AEROSOL, METERED RESPIRATORY (INHALATION) at 13:17

## 2025-02-17 RX ADMIN — GUAIFENESIN SYRUP AND DEXTROMETHORPHAN 5 ML: 100; 10 SYRUP ORAL at 20:47

## 2025-02-17 RX ADMIN — ALBUTEROL SULFATE 2 PUFF: 90 AEROSOL, METERED RESPIRATORY (INHALATION) at 09:16

## 2025-02-17 RX ADMIN — AMOXICILLIN AND CLAVULANATE POTASSIUM 1 TABLET: 500; 125 TABLET, FILM COATED ORAL at 13:26

## 2025-02-17 RX ADMIN — MICONAZOLE NITRATE: 2 POWDER TOPICAL at 09:39

## 2025-02-17 RX ADMIN — ENOXAPARIN SODIUM 40 MG: 100 INJECTION SUBCUTANEOUS at 09:36

## 2025-02-17 RX ADMIN — ATORVASTATIN CALCIUM 40 MG: 40 TABLET, FILM COATED ORAL at 20:47

## 2025-02-17 RX ADMIN — DOXYCYCLINE HYCLATE 100 MG: 100 TABLET, COATED ORAL at 09:35

## 2025-02-17 RX ADMIN — ESCITALOPRAM OXALATE 20 MG: 20 TABLET ORAL at 09:36

## 2025-02-17 RX ADMIN — OXYCODONE HYDROCHLORIDE AND ACETAMINOPHEN 1 TABLET: 5; 325 TABLET ORAL at 20:46

## 2025-02-17 ASSESSMENT — PAIN SCALES - GENERAL
PAINLEVEL_OUTOF10: 6
PAINLEVEL_OUTOF10: 8
PAINLEVEL_OUTOF10: 7
PAINLEVEL_OUTOF10: 7
PAINLEVEL_OUTOF10: 6
PAINLEVEL_OUTOF10: 7
PAINLEVEL_OUTOF10: 7

## 2025-02-17 ASSESSMENT — PAIN DESCRIPTION - LOCATION
LOCATION: ABDOMEN

## 2025-02-17 ASSESSMENT — PAIN DESCRIPTION - DESCRIPTORS
DESCRIPTORS: ACHING
DESCRIPTORS: ACHING

## 2025-02-17 ASSESSMENT — PAIN DESCRIPTION - ORIENTATION
ORIENTATION: LEFT

## 2025-02-17 NOTE — CARE COORDINATION
2/17/25, 9:14 AM EST    DISCHARGE PLANNING EVALUATION    Spoke with physician who has completed peer to peer, will need PT/OT updated notes within 4 hours.  Spoke with RN who is attempting to contact PT/OT with this request.       Precert started by ISHAAN, will need PT/OT notes uploaded to portal when available for precert update.  Discussed with case management director for assistance with this.

## 2025-02-17 NOTE — CARE COORDINATION
2/17/25, 1:19 PM EST    DISCHARGE PLANNING EVALUATION    Peer to peer updated with PT/OT notes, waiting decision from insurance and confirmation of approval.

## 2025-02-17 NOTE — CARE COORDINATION
Peer to Peer needs to be complete before noon 2/17/2025.  Provider needs to call [phone] option [phone].  Patient name Elli Coulter  Member ID [id number]   1955.  I PS Dr. Alfaro and gave the above information.

## 2025-02-17 NOTE — CARE COORDINATION
2/17/25, 11:47 AM EST    DISCHARGE PLANNING EVALUATION    PT/OT have seen and notes are available.  Spoke with Siri for assistance in submitting these notes, they are unable without a reference number , also discussed with case management director as CMA is not available today.

## 2025-02-18 VITALS
SYSTOLIC BLOOD PRESSURE: 142 MMHG | WEIGHT: 171.52 LBS | OXYGEN SATURATION: 96 % | RESPIRATION RATE: 16 BRPM | DIASTOLIC BLOOD PRESSURE: 90 MMHG | TEMPERATURE: 97.8 F | HEART RATE: 70 BPM | BODY MASS INDEX: 29.28 KG/M2 | HEIGHT: 64 IN

## 2025-02-18 LAB
GLUCOSE BLD STRIP.AUTO-MCNC: 131 MG/DL (ref 70–108)
GLUCOSE BLD STRIP.AUTO-MCNC: 139 MG/DL (ref 70–108)

## 2025-02-18 PROCEDURE — 82948 REAGENT STRIP/BLOOD GLUCOSE: CPT

## 2025-02-18 PROCEDURE — 6370000000 HC RX 637 (ALT 250 FOR IP)

## 2025-02-18 PROCEDURE — 2500000003 HC RX 250 WO HCPCS

## 2025-02-18 PROCEDURE — 6360000002 HC RX W HCPCS

## 2025-02-18 PROCEDURE — 6370000000 HC RX 637 (ALT 250 FOR IP): Performed by: INTERNAL MEDICINE

## 2025-02-18 PROCEDURE — 99239 HOSP IP/OBS DSCHRG MGMT >30: CPT | Performed by: STUDENT IN AN ORGANIZED HEALTH CARE EDUCATION/TRAINING PROGRAM

## 2025-02-18 PROCEDURE — 94640 AIRWAY INHALATION TREATMENT: CPT

## 2025-02-18 RX ORDER — AMOXICILLIN AND CLAVULANATE POTASSIUM 500; 125 MG/1; MG/1
1 TABLET, FILM COATED ORAL EVERY 8 HOURS SCHEDULED
DISCHARGE
Start: 2025-02-18 | End: 2025-02-22

## 2025-02-18 RX ORDER — FERROUS SULFATE 325(65) MG
325 TABLET ORAL
DISCHARGE
Start: 2025-03-03

## 2025-02-18 RX ORDER — DOXYCYCLINE HYCLATE 100 MG
100 TABLET ORAL EVERY 12 HOURS SCHEDULED
DISCHARGE
Start: 2025-02-18 | End: 2025-02-22

## 2025-02-18 RX ORDER — OXYCODONE AND ACETAMINOPHEN 5; 325 MG/1; MG/1
1 TABLET ORAL EVERY 6 HOURS PRN
Qty: 12 TABLET | Refills: 0 | Status: SHIPPED | OUTPATIENT
Start: 2025-02-18 | End: 2025-02-21

## 2025-02-18 RX ORDER — INSULIN GLARGINE 100 [IU]/ML
10 INJECTION, SOLUTION SUBCUTANEOUS NIGHTLY
DISCHARGE
Start: 2025-02-18

## 2025-02-18 RX ORDER — CEFADROXIL 500 MG/1
500 CAPSULE ORAL 2 TIMES DAILY
Qty: 20 CAPSULE | Refills: 0 | Status: SHIPPED | OUTPATIENT
Start: 2025-02-18 | End: 2025-02-28

## 2025-02-18 RX ORDER — AMLODIPINE BESYLATE 10 MG/1
10 TABLET ORAL DAILY
DISCHARGE
Start: 2025-02-19

## 2025-02-18 RX ORDER — GUAIFENESIN/DEXTROMETHORPHAN 100-10MG/5
5 SYRUP ORAL EVERY 4 HOURS PRN
DISCHARGE
Start: 2025-02-18 | End: 2025-02-28

## 2025-02-18 RX ADMIN — GUAIFENESIN SYRUP AND DEXTROMETHORPHAN 5 ML: 100; 10 SYRUP ORAL at 03:53

## 2025-02-18 RX ADMIN — SODIUM CHLORIDE, PRESERVATIVE FREE 10 ML: 5 INJECTION INTRAVENOUS at 08:26

## 2025-02-18 RX ADMIN — BUPROPION HYDROCHLORIDE 300 MG: 300 TABLET, EXTENDED RELEASE ORAL at 08:26

## 2025-02-18 RX ADMIN — ENOXAPARIN SODIUM 40 MG: 100 INJECTION SUBCUTANEOUS at 08:25

## 2025-02-18 RX ADMIN — OXYCODONE HYDROCHLORIDE AND ACETAMINOPHEN 1 TABLET: 5; 325 TABLET ORAL at 11:03

## 2025-02-18 RX ADMIN — MICONAZOLE NITRATE: 2 POWDER TOPICAL at 08:27

## 2025-02-18 RX ADMIN — DIVALPROEX SODIUM 125 MG: 125 CAPSULE ORAL at 08:26

## 2025-02-18 RX ADMIN — ESCITALOPRAM OXALATE 20 MG: 20 TABLET ORAL at 08:27

## 2025-02-18 RX ADMIN — AMOXICILLIN AND CLAVULANATE POTASSIUM 1 TABLET: 500; 125 TABLET, FILM COATED ORAL at 04:17

## 2025-02-18 RX ADMIN — ALBUTEROL SULFATE 2 PUFF: 90 AEROSOL, METERED RESPIRATORY (INHALATION) at 13:10

## 2025-02-18 RX ADMIN — CARVEDILOL 3.12 MG: 3.12 TABLET, FILM COATED ORAL at 08:26

## 2025-02-18 RX ADMIN — DOXYCYCLINE HYCLATE 100 MG: 100 TABLET, COATED ORAL at 08:26

## 2025-02-18 RX ADMIN — OXYCODONE HYDROCHLORIDE AND ACETAMINOPHEN 1 TABLET: 5; 325 TABLET ORAL at 03:52

## 2025-02-18 RX ADMIN — PANTOPRAZOLE SODIUM 40 MG: 40 TABLET, DELAYED RELEASE ORAL at 05:01

## 2025-02-18 RX ADMIN — AMLODIPINE BESYLATE 10 MG: 10 TABLET ORAL at 08:27

## 2025-02-18 RX ADMIN — LEVOTHYROXINE SODIUM 75 MCG: 0.07 TABLET ORAL at 05:00

## 2025-02-18 RX ADMIN — ALBUTEROL SULFATE 2 PUFF: 90 AEROSOL, METERED RESPIRATORY (INHALATION) at 09:03

## 2025-02-18 RX ADMIN — AMOXICILLIN AND CLAVULANATE POTASSIUM 1 TABLET: 500; 125 TABLET, FILM COATED ORAL at 14:24

## 2025-02-18 RX ADMIN — FENOFIBRATE 160 MG: 160 TABLET ORAL at 08:27

## 2025-02-18 ASSESSMENT — PAIN DESCRIPTION - ORIENTATION
ORIENTATION: LEFT
ORIENTATION: LEFT

## 2025-02-18 ASSESSMENT — PAIN - FUNCTIONAL ASSESSMENT: PAIN_FUNCTIONAL_ASSESSMENT: ACTIVITIES ARE NOT PREVENTED

## 2025-02-18 ASSESSMENT — PAIN DESCRIPTION - DESCRIPTORS: DESCRIPTORS: ACHING;DISCOMFORT

## 2025-02-18 ASSESSMENT — PAIN SCALES - GENERAL
PAINLEVEL_OUTOF10: 8
PAINLEVEL_OUTOF10: 6
PAINLEVEL_OUTOF10: 7

## 2025-02-18 ASSESSMENT — PAIN DESCRIPTION - LOCATION: LOCATION: ABDOMEN

## 2025-02-18 NOTE — PROGRESS NOTES
Progress note: Infectious diseases    Patient - Elli Coulter,  Age - 69 y.o.    - 1955      Room Number - 3A-05/005-A   MRN -  [id number]   [facility] # - [id number]  Date of Admission -  2025  9:33 PM    SUBJECTIVE:   She has pain on the left flank.  She has drainage from the left flank.  Area around the drainage tube  OBJECTIVE   VITALS    height is 1.626 m (5' 4\") and weight is 78.9 kg (174 lb). Her oral temperature is 98.1 °F (36.7 °C). Her blood pressure is 170/78 (abnormal) and her pulse is 62. Her respiration is 16 and oxygen saturation is 93%.       Wt Readings from Last 3 Encounters:   25 78.9 kg (174 lb)   25 78.5 kg (173 lb)   01/15/25 86.2 kg (190 lb)       I/O (24 Hours)    Intake/Output Summary (Last 24 hours) at 2025 1001  Last data filed at 2025 0600  Gross per 24 hour   Intake 600 ml   Output 1000 ml   Net -400 ml       General Appearance  Awake, alert, oriented,  not  In acute distress  HEENT - normocephalic, atraumatic, pink conjunctiva,  anicteric sclera  Neck - Supple, no mass  Lungs -  Bilateral + air entry, no rhonchi, no wheeze  Cardiovascular - Heart sounds are normal.     Abdomen -there is swelling and induration to the left flank area.  Has drainage from around the catheter.  It was red and warm to touch  Neurologic -oriented  Skin - No bruising or bleeding  Extremities -+ edema, no cyanosis, clubbing     MEDICATIONS:      insulin glargine  10 Units SubCUTAneous Nightly    atorvastatin  40 mg Oral Nightly    buPROPion  300 mg Oral QAM    carvedilol  3.125 mg Oral BID    escitalopram  20 mg Oral Daily    fenofibrate  160 mg Oral Daily    folic acid  1 mg Oral Daily    levothyroxine  75 mcg Oral QAM AC    linaclotide  145 mcg Oral QAM AC    pantoprazole  40 mg Oral BID AC    QUEtiapine  200 mg Oral Nightly    rOPINIRole  0.5 mg Oral Nightly    traZODone  200 mg Oral 
                                                                                          Progress note: Infectious diseases    Patient - Elli Coulter,  Age - 69 y.o.    - 1955      Room Number - 3A-05/005-A   MRN -  [id number]   [facility] # - [id number]  Date of Admission -  2025  9:33 PM    SUBJECTIVE:   Patient seen and evaluated at bedside this afternoon. Patient reports productive cough with grey sputum. Patient reports ongoing left flank pain. Denies any other acute issues.   OBJECTIVE   VITALS    height is 1.626 m (5' 4\") and weight is 78.9 kg (173 lb 15.1 oz). Her oral temperature is 97.9 °F (36.6 °C). Her blood pressure is 141/53 (abnormal) and her pulse is 56. Her respiration is 19 and oxygen saturation is 94%.       Wt Readings from Last 3 Encounters:   25 78.9 kg (173 lb 15.1 oz)   25 78.5 kg (173 lb)   01/15/25 86.2 kg (190 lb)       I/O (24 Hours)    Intake/Output Summary (Last 24 hours) at 2025 1317  Last data filed at 2025 1130  Gross per 24 hour   Intake 660 ml   Output --   Net 660 ml       General Appearance  Awake, alert, oriented,  not  In acute distress  HEENT - normocephalic, atraumatic, pink conjunctiva,  anicteric sclera  Neck - Supple, no mass  Lungs -  Bilateral air entry, no rhonchi, no wheeze  Cardiovascular - Heart sounds are normal.  Regular rate and rhythm without murmur, gallop or rub.  Abdomen - Swelling in left flank continues to improve. Induration improved. No active drainage.   Neurologic -Oriented  Skin - No bruising or bleeding  Extremities - No edema, no cyanosis, clubbing     MEDICATIONS:      vancomycin  1,250 mg IntraVENous Q18H    divalproex  125 mg Oral BID    [START ON 2025] levothyroxine  150 mcg Oral Every     levothyroxine  75 mcg Oral Once per day on     amLODIPine  10 mg Oral Daily    insulin glargine  10 Units SubCUTAneous Nightly    atorvastatin  40 mg Oral Nightly    
                                                                                          Progress note: Infectious diseases    Patient - Elli Coulter,  Age - 69 y.o.    - 1955      Room Number - 3A-05/005-A   MRN -  [id number]   [facility] # - [id number]  Date of Admission -  2025  9:33 PM    SUBJECTIVE:    No new issues. She is feeling better  OBJECTIVE   VITALS    height is 1.626 m (5' 4\") and weight is 78.9 kg (173 lb 15.1 oz). Her temperature is 98 °F (36.7 °C). Her blood pressure is 125/77 and her pulse is 57. Her respiration is 16 and oxygen saturation is 97%.       Wt Readings from Last 3 Encounters:   25 78.9 kg (173 lb 15.1 oz)   25 78.5 kg (173 lb)   01/15/25 86.2 kg (190 lb)       I/O (24 Hours)    Intake/Output Summary (Last 24 hours) at 202525  Last data filed at 2025  Gross per 24 hour   Intake 360 ml   Output 0 ml   Net 360 ml       General Appearance  Awake, alert, oriented,  not  In acute distress  HEENT - normocephalic, atraumatic, pink conjunctiva,  anicteric sclera  Neck - Supple, no mass  Lungs -  Bilateral air entry, no rhonchi, no wheeze  Cardiovascular - Heart sounds are normal.     Abdomen -the swelling redness and induration is less on the left flank  Neurologic -Oriented  Skin - No bruising or bleeding  Extremities - No edema, no cyanosis, clubbing     MEDICATIONS:      vancomycin  1,250 mg IntraVENous Q18H    albuterol sulfate HFA  2 puff Inhalation TID RT    divalproex  125 mg Oral BID    [START ON 2025] levothyroxine  150 mcg Oral Every     levothyroxine  75 mcg Oral Once per day on     amLODIPine  10 mg Oral Daily    insulin glargine  10 Units SubCUTAneous Nightly    atorvastatin  40 mg Oral Nightly    buPROPion  300 mg Oral QAM    carvedilol  3.125 mg Oral BID    escitalopram  20 mg Oral Daily    fenofibrate  160 mg Oral Daily    pantoprazole  40 mg Oral BID AC    rOPINIRole  0.5 mg 
                                                                                          Progress note: Infectious diseases    Patient - Elli Coulter,  Age - 69 y.o.    - 1955      Room Number - 3A-05/005-A   MRN -  [id number]   [facility] # - [id number]  Date of Admission -  2025  9:33 PM    SUBJECTIVE:   Patient seen and evaluated at bedside this morning. Reports on going left flank pain. Reports some tingling sensation with urination but denies dysuria. No other acute complaints at this time.   OBJECTIVE   VITALS    height is 1.626 m (5' 4\") and weight is 78.9 kg (174 lb). Her oral temperature is 98.7 °F (37.1 °C). Her blood pressure is 173/67 (abnormal) and her pulse is 62. Her respiration is 24 and oxygen saturation is 97%.       Wt Readings from Last 3 Encounters:   25 78.9 kg (174 lb)   25 78.5 kg (173 lb)   01/15/25 86.2 kg (190 lb)       I/O (24 Hours)    Intake/Output Summary (Last 24 hours) at 2/10/2025 0815  Last data filed at 2/10/2025 0542  Gross per 24 hour   Intake 1000 ml   Output --   Net 1000 ml       General Appearance  Awake, alert, oriented,  not  In acute distress  HEENT - normocephalic, atraumatic, pink conjunctiva,  anicteric sclera  Neck - Supple, no mass  Lungs -  Bilateral air entry, no rhonchi, no wheeze  Cardiovascular - Heart sounds are normal.  Regular rate and rhythm without murmur, gallop or rub.  Abdomen - there is swelling on the left flank, no drainage, the redness is less  Neurologic -Oriented  Skin - No bruising or bleeding  Extremities - No edema, no cyanosis, clubbing     MEDICATIONS:      insulin glargine  10 Units SubCUTAneous Nightly    atorvastatin  40 mg Oral Nightly    buPROPion  300 mg Oral QAM    carvedilol  3.125 mg Oral BID    escitalopram  20 mg Oral Daily    fenofibrate  160 mg Oral Daily    folic acid  1 mg Oral Daily    levothyroxine  75 mcg Oral QAM AC    linaclotide  145 mcg Oral QAM AC    pantoprazole  40 mg Oral BID AC    QUEtiapine  
                                                                                          Progress note: Infectious diseases    Patient - Elli Coulter,  Age - 69 y.o.    - 1955      Room Number - 7K-28/028-A   MRN -  [id number]   [facility] # - [id number]  Date of Admission -  2025  9:33 PM    SUBJECTIVE:   No new complaints  OBJECTIVE   VITALS    height is 1.626 m (5' 4\") and weight is 78.4 kg (172 lb 13.5 oz). Her oral temperature is 97.7 °F (36.5 °C). Her blood pressure is 137/67 and her pulse is 57. Her respiration is 18 and oxygen saturation is 97%.       Wt Readings from Last 3 Encounters:   25 78.4 kg (172 lb 13.5 oz)   25 78.5 kg (173 lb)   01/15/25 86.2 kg (190 lb)       I/O (24 Hours)    Intake/Output Summary (Last 24 hours) at 2/15/2025 1154  Last data filed at 2/15/2025 0625  Gross per 24 hour   Intake 642 ml   Output 0 ml   Net 642 ml       General Appearance  Awake, alert, oriented,  not  In acute distress  HEENT - normocephalic, atraumatic, pink conjunctiva,  anicteric sclera  Neck - Supple, no mass  Lungs -  Bilateral air entry,    Cardiovascular - Heart sounds are normal.  Regular rate and rhythm without murmur, gallop or rub.  Abdomen - the redness and swelling on the left flank is much improved  Neurologic -Oriented  Skin - No bruising or bleeding  Extremities - No edema, no cyanosis, clubbing     MEDICATIONS:      albuterol sulfate HFA  2 puff Inhalation BID RT    divalproex  125 mg Oral BID    [START ON 2025] levothyroxine  150 mcg Oral Every     levothyroxine  75 mcg Oral Once per day on     amLODIPine  10 mg Oral Daily    insulin glargine  10 Units SubCUTAneous Nightly    atorvastatin  40 mg Oral Nightly    buPROPion  300 mg Oral QAM    carvedilol  3.125 mg Oral BID    escitalopram  20 mg Oral Daily    fenofibrate  160 mg Oral Daily    pantoprazole  40 mg Oral BID AC    rOPINIRole  0.5 mg Oral Nightly    
                                                                                          Progress note: Infectious diseases    Patient - Elli Coulter,  Age - 69 y.o.    - 1955      Room Number - 7K-28/028-A   MRN -  [id number]   [facility] # - [id number]  Date of Admission -  2025  9:33 PM    SUBJECTIVE:   Patient seen and evaluated at bedside this morning. She reports intermittent chills and ongoing left flank pain. Denies any other issues at this time. States she had an episode of coughing yesterday and respiratory came and gave her a breathing treatment states that helped her cough.   OBJECTIVE   VITALS    height is 1.626 m (5' 4\") and weight is 78.4 kg (172 lb 13.5 oz). Her oral temperature is 97.6 °F (36.4 °C). Her blood pressure is 131/65 and her pulse is 61. Her respiration is 16 and oxygen saturation is 96%.       Wt Readings from Last 3 Encounters:   25 78.4 kg (172 lb 13.5 oz)   25 78.5 kg (173 lb)   01/15/25 86.2 kg (190 lb)       I/O (24 Hours)    Intake/Output Summary (Last 24 hours) at 2025 0830  Last data filed at 2025  Gross per 24 hour   Intake 800 ml   Output --   Net 800 ml       General Appearance  Awake, alert, oriented,  not  In acute distress  HEENT - normocephalic, atraumatic, pink conjunctiva,  anicteric sclera  Neck - Supple, no mass  Lungs -  Bilateral air entry, no rhonchi, no wheeze  Cardiovascular - Heart sounds are normal.  Regular rate and rhythm without murmur, gallop or rub.  Abdomen - Swelling in left flank with improved induration. No active drainage. Redness is improved.   Neurologic -Oriented  Skin - No bruising or bleeding  Extremities - No edema, no cyanosis, clubbing     MEDICATIONS:      guaiFENesin  600 mg Oral BID    vancomycin  1,250 mg IntraVENous Q18H    albuterol sulfate HFA  2 puff Inhalation TID RT    divalproex  125 mg Oral BID    [START ON 2025] levothyroxine  150 mcg Oral Every     levothyroxine  75 mcg Oral Once 
                                                                                          Progress note: Infectious diseases    Patient - Elli Coulter,  Age - 69 y.o.    - 1955      Room Number - 7K-28/028-A   MRN -  [id number]   [facility] # - [id number]  Date of Admission -  2025  9:33 PM    SUBJECTIVE:   She is feeling better  OBJECTIVE   VITALS    height is 1.626 m (5' 4\") and weight is 77.8 kg (171 lb 8.3 oz). Her oral temperature is 97.8 °F (36.6 °C). Her blood pressure is 142/90 (abnormal) and her pulse is 70. Her respiration is 17 and oxygen saturation is 96%.       Wt Readings from Last 3 Encounters:   25 77.8 kg (171 lb 8.3 oz)   25 78.5 kg (173 lb)   01/15/25 86.2 kg (190 lb)       I/O (24 Hours)    Intake/Output Summary (Last 24 hours) at 2025 1038  Last data filed at 2025 0303  Gross per 24 hour   Intake 500 ml   Output --   Net 500 ml       General Appearance  Awake, alert, oriented,  not  In acute distress  HEENT - normocephalic, atraumatic, pink conjunctiva,  anicteric sclera  Neck - Supple, no mass  Lungs -  Bilateral air entry,    Cardiovascular - Heart sounds are normal.  Regular rate and rhythm without murmur, gallop or rub.  Abdomen - soft non tender, the swelling and redness on the left flank area has resolved.no drainage   Neurologic -Oriented  Skin - No bruising or bleeding  Extremities - No edema, no cyanosis, clubbing     MEDICATIONS:      albuterol sulfate HFA  2 puff Inhalation TID    miconazole   Topical BID    amoxicillin-clavulanate  1 tablet Oral 3 times per day    doxycycline hyclate  100 mg Oral 2 times per day    divalproex  125 mg Oral BID    levothyroxine  150 mcg Oral Every     levothyroxine  75 mcg Oral Once per day on     amLODIPine  10 mg Oral Daily    insulin glargine  10 Units SubCUTAneous Nightly    atorvastatin  40 mg Oral Nightly    buPROPion  300 mg Oral QAM    carvedilol  3.125 mg 
                                                                       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  Status post left nephrectomy.  Has recurrent retroperitoneal abscesses.  Was admitted to [facility] January 15 to 20, 2025.  Percutaneous drain placed on 1/16.  Blood cultures growing Proteus.  Patient discharged on Augmentin.  Since then, has noted increased swelling, erythema, calor to her left flank.  CT abdomen pelvis showing soft tissue swelling with suggestion of cellulitis but no bowen abscess.  Discussed case with infectious disease, appreciate recommendations.  Drain removed at bedside 2/9/2025.  On review of CT, no bowen abscess.  Will hold off on replacing drain.  If cellulitis worsens, may need to consult general surgery for I&D.  Urology consulted, appreciate recommendations.  At this point, patient does not want to be transferred to [place].  Urine cultures were drawn, growing GNR  Wound cultures collected, growth of Proteus, which is same organism growing from perinephric/retroperitoneal abscess.  Currently on ceftriaxone and vancomycin.  Will continue for now per ID recommendations.  Review of Proteus from cultures drawn on 1/16/2025 show that is ceftriaxone sensitive.    Hyponatremia  Mild hyponatremia at 133.  Monitor for now.  Asymptomatic    COVID infection/positive COVID test  Positive COVID test prior to arrival.  Notes some dry cough.  Has chronic dyspnea which is unchanged.  Treat conservatively.    Type 2 diabetes  Patient has history of type 2 diabetes with 
    [facility]  [facility] [address]  [address] [address]  Dept: [phone]  Loc: [phone]  Visit Date: 2/7/2025    Urology Consult Note    Reason for Consult:    History of recurrent perinephric abscesses, drain placed with now purulent drainage around site and cellulitis       Chief Complaint: drainage nad redness around abscess drain on left.     HISTORY OF PRESENT ILLNESS:                The patient is a 69 y.o. female with significant past medical history of recurrent perinephric fluid collection, simple left nephrectomy at [facility] 11/2024 who presented with left flank pain and drainage and redness around left drainage tube that was placed 1/16/25.  Urology was consulted for history of recurrent perinephric abscesses, drain placed with now purulent drainage around site and cellulitis.  ID is following.      Hx of a recurrent L sided perinephric/retroperitoneal abscess. She has had several hospitalization over the last couple years, with the first being on 1/15/2023. IR has performed multiple procedures to assist with drainage of these formed abscesses. Given the recurrent formation of the abscess despite several interventions, the patient was seen by [facility] on 4/30/2024. She was encouraged to maintain consistent hydration and get a CTU to assess for connection with urinary system. Future treatment options per [facility] included IR drainage at [facility] or undergoing a refractory nephrectomy. On 11/14/2024 she underwent a simple L nephrectomy with the [facility]. [facility] saw 1/2025 for Ct findings of a rim enhancing fluid collection in the L retroperitoneum measuring 10.8 cm in size- increased from prior. Our group recommended transfer to a tertiary care center but the patient has refused. She is now s/p placement of a retroperitoneal abscess drain with IR on 1/16/2025.Hx Fluid culture with heavy growth of proteus and given augmentin.      -Also with hx of kidney 
  Physician Progress Note      PATIENT:               PATRICK BUTCHER  CSN #:                  [id number]  :                       1955  ADMIT DATE:       2025 9:33 PM  DISCH DATE:  RESPONDING  PROVIDER #:        Magda Glynn MD          QUERY TEXT:    Pt admitted with left flank wall cellulitis. Pt noted to have recent left   nephrectomy and pigtail drainage catheter in place. If possible, please   document in progress notes and discharge summary the relationship, if any,   between left flank wall cellulitis and the following:    The medical record reflects the following:  Risk Factors: left flank wall cellulitis  Clinical Indicators: presented with left flank wall cellulitis; recent left   nephrectomy and pigtail drainage catheter in place; CT abd/pelvis shows recent   left nephrectomy, pigtail catheter drain in the left perinephric region/left   flank is unchanged in position compared to prior exam, left perinephric fluid   collection appears nearly completely resolved and is unchanged in appearance   compared to 25, soft tissue edema within the subcutaneous fat of the left   flank around the drain is more prominent on todays exam than compared to prior   exam likely representing a worsening   Treatment: Labs, imaging, monitoring, Rocephin, Vancomycin  Options provided:  -- Left flank wall cellulitis due to pigtail catheter  -- Left flank wall cellulitis due to left nephrectomy  -- Other - I will add my own diagnosis  -- Disagree - Not applicable / Not valid  -- Disagree - Clinically unable to determine / Unknown  -- Refer to Clinical Documentation Reviewer    PROVIDER RESPONSE TEXT:    This patient has left flank wall cellulitis due to pigtail catheter.    Query created by: Sydnee Pantoja on 2/10/2025 5:23 PM      Electronically signed by:  Magda Glynn MD 2025 1:50 PM          
 [facility]  INPATIENT PHYSICAL THERAPY  REASSESSMENT/DAILY NOTE  [facility] ORTHOPEDICS 7K - 7K-28/028-A      Discharge Recommendations: Patient would benefit from continued PT at discharge  Equipment Recommendations: No               Time In: 1035  Time Out: 1058  Timed Code Treatment Minutes: 23 Minutes  Minutes: 23          Date: 2025  Patient Name: Elli Coulter,  Gender:  female        MRN: [id number]  : 1955  (69 y.o.)     Referring Practitioner: Chang Henry DO  Diagnosis: Cellulitis  Additional Pertinent Hx: Per EMR \"The patient is a 69 y.o. female who presents with complaints of flank pain and drainage/redness around left nephrostomy tube.  History of recurrent perinephric fluid collection s/p left nephrectomy.  Recent admission from 1/15-, with similar complaint and had drain placed by IR.  Peritoneal fluid showed Proteus and patient was discharged on Augmentin back to SNF with outpatient urology follow-up.  Was seen in the ER on  due to increased pain at drain site, per ED note was noted to have drainage that was purulent.  However CT had showed resolution of perinephric abscess and as patient's pain improved she was discharged with Percocet.  Presented back to ER on evening of  again due to complaints of pain around drain site as well as increased redness and drainage.  Repeat CT now showing soft tissue edema and worsening cellulitis, no abscess.  Was started on Rocephin and vancomycin in ER.  Hospitalist contacted for admission.  Upon my exam, patient is resting in bed.  She tells me that she started to notice increased pain around drain site approximately a week ago.  Upon evaluation, noted to have significant erythema and warmth with a large amount of purulent drainage around drain tube.  See media.  Denies any other abdominal pain, chest pain, shortness of breath.     Prior Level of Function:  Lives With: Alone  Type of Home: Assisted living  Home Layout: One 
[facility]   Pharmacy Pharmacokinetic Monitoring Service - Vancomycin    Indication: L perinephric abscess  Target Concentration: Goal AUC/FELICIA 400-600 mg*hr/L  Day of Therapy: 3  Additional Antimicrobials: Rocephin    Pertinent Laboratory Values:   Wt Readings from Last 1 Encounters:   02/07/25 78.9 kg (174 lb)     Temp Readings from Last 1 Encounters:   02/10/25 97.7 °F (36.5 °C) (Oral)     Estimated Creatinine Clearance: 69 mL/min (based on SCr of 0.8 mg/dL).  Recent Labs     02/07/25  2214 02/09/25  0505   CREATININE 0.9 0.8   BUN 19 12   WBC 9.3 6.4       Recent vancomycin administrations                     vancomycin (VANCOCIN) 1500 mg in sodium chloride 0.9 % 250 mL IVPB (mg) 1,500 mg New Bag 02/10/25 0041     1,500 mg New Bag 02/09/25 0137    vancomycin (VANCOCIN) 2000 mg in 0.9% sodium chloride 500 mL IVPB (mg) 2,000 mg New Bag 02/08/25 0118                    Assessment:  Date/Time Current Dose Concentration Timing of Concentration (hr) AUC C trough,ss   2/10/25 @ 1137 1500 mg q24h 11.9 10H 56 M 308 6.2   Note: Serum concentrations collected for AUC dosing may appear elevated if collected in close proximity to the dose administered, this is not necessarily an indication of toxicity    Plan:  Current dosing regimen is sub-therapeutic  Increase dose to 1250mg q12h for estimated AUC of  510 & trough of 14.1  Pharmacy will monitor renal function daily and adjust therapy as indicated.    Thank you for the consult,  Julissa ARECHIGA.Ph., BCPS., 2/10/2025,1:05 PM      
[facility]   Pharmacy Pharmacokinetic Monitoring Service - Vancomycin    Indication:  L perinephric abscess   Target Concentration: Goal AUC/FELICIA 400-600 mg*hr/L  Day of Therapy: 5  Additional Antimicrobials: ceftriaxone    Pertinent Laboratory Values:   Wt Readings from Last 1 Encounters:   02/12/25 78.9 kg (173 lb 15.1 oz)     Temp Readings from Last 1 Encounters:   02/12/25 97.6 °F (36.4 °C) (Oral)     Estimated Creatinine Clearance: 62 mL/min (based on SCr of 0.9 mg/dL).  Recent Labs     02/11/25  0520 02/12/25  0453   CREATININE 0.8 0.9   BUN 20 20   WBC 4.0* 5.2     Pertinent Cultures:  Date Source Results   2/8/25 Left nephrostomy tube Proteus, mirabilis, staph aureus   2/8/25 Urine cx Proteus mirabilis, kleb pneumoniae   2/8/25 BC NG x 48hrs     Recent vancomycin administrations                     vancomycin (VANCOCIN) 1250 mg in sodium chloride 0.9% 250 mL IVPB (mg) 1,250 mg New Bag 02/12/25 0430     1,250 mg New Bag 02/11/25 1807     1,250 mg New Bag  0535     1,250 mg New Bag 02/10/25 1540    vancomycin (VANCOCIN) 1500 mg in sodium chloride 0.9 % 250 mL IVPB (mg) 1,500 mg New Bag 02/10/25 0041                    Assessment:  Date/Time Current Dose Concentration Timing of Concentration (hr) AUC C trough,   02/12/25 1250 mg q12h 27.7 6 hrs 696 19.7   Note: Serum concentrations collected for AUC dosing may appear elevated if collected in close proximity to the dose administered, this is not necessarily an indication of toxicity    Plan:  Current dosing regimen is supra-therapeutic  Decrease dose to 1250 mg q18h for estimated AUC of  473 & trough of 11.9  Repeat vancomycin concentration not yet ordered  Pharmacy will monitor renal function daily and adjust therapy as indicated.    Thank you for the consult,  Justine Ng, PharmD 2/12/2025 11:07 AM    
[facility]   Pharmacy Pharmacokinetic Monitoring Service - Vancomycin    Indication: SSTI  Target Concentration: Goal AUC/FELICIA 400-600 mg*hr/L  Day of Therapy: 7  Additional Antimicrobials: None    Pertinent Laboratory Values:   Wt Readings from Last 1 Encounters:   02/14/25 78.4 kg (172 lb 13.5 oz)     Temp Readings from Last 1 Encounters:   02/14/25 97 °F (36.1 °C) (Axillary)     Estimated Creatinine Clearance: 61 mL/min (based on SCr of 0.9 mg/dL).  Recent Labs     02/12/25  0453 02/13/25  0506   CREATININE 0.9 0.9   BUN 20 19   WBC 5.2 5.6     Pertinent Cultures:  Date Source Results   2/8 Left nephrostomy MRSA heavy growth, Proteus mirabilis   MRSA Nasal Swab: N/A. Non-respiratory infection.    Recent vancomycin administrations                     vancomycin (VANCOCIN) 1250 mg in sodium chloride 0.9% 250 mL IVPB ()  Restarted 02/13/25 1830     1,250 mg New Bag  1744     1,250 mg New Bag 02/12/25 2228    vancomycin (VANCOCIN) 1250 mg in sodium chloride 0.9% 250 mL IVPB (mg) 1,250 mg New Bag 02/12/25 0430     1,250 mg New Bag 02/11/25 1807                    Assessment:  Date/Time Current Dose Concentration Timing of Concentration (hr) AUC C trough,ss   2/14 0750 1250 mg q18h 15.7 14 hr 6 min 489 12.8   Note: Serum concentrations collected for AUC dosing may appear elevated if collected in close proximity to the dose administered, this is not necessarily an indication of toxicity    Plan:  Current dosing regimen is therapeutic  Continue current dose of 1250 mg q18h  Pharmacy will monitor renal function daily and adjust therapy as indicated.    Thank you for the consult,  Gisel Blankenship PharmD 2/14/2025 10:23 AM    
[facility]   Pharmacy Pharmacokinetic Monitoring Service - Vancomycin     Elli Coulter is a 69 y.o. female starting on vancomycin therapy for a SSTI. Pharmacy consulted by Dominga Mercado CNP for monitoring and adjustment.    Target Concentration: Goal AUC/FELICIA 400-600 mg*hr/L    Additional Antimicrobials: Rocephin    Pertinent Laboratory Values:   Wt Readings from Last 1 Encounters:   02/07/25 78.9 kg (174 lb)     Temp Readings from Last 1 Encounters:   02/07/25 98.3 °F (36.8 °C) (Oral)     Estimated Creatinine Clearance: 62 mL/min (based on SCr of 0.9 mg/dL).  Recent Labs     02/05/25  2346 02/07/25  2214   CREATININE 0.9 0.9   BUN 24* 19   WBC 7.7 9.3     Pertinent Cultures:  Date Source Results   02/07/25 BCx2, wound    02/08/25 UC    MRSA Nasal Swab: N/A. Non-respiratory infection.    Plan:  Dosing recommendations based on Bayesian software  Start vancomycin 2000 mg iv load (already given) followed by 1500 mg every 24 hours.  Anticipated AUC of 475 and trough concentration of 13.3 at steady state  Renal labs as indicated  Pharmacy will continue to monitor patient and adjust therapy as indicated    Thank you for the consult,  Tamara Reilly [facility] PharmD  2/8/2025   4:03 AM      
[facility]  INPATIENT OCCUPATIONAL THERAPY  [facility] CCU 3A  EVALUATION      Discharge Recommendations: Patient would benefit from continued therapy after discharge (return to facility with OT services)  Equipment Recommendations: No (defer to next level of care)        Time In: 830  Time Out: 852  Timed Code Treatment Minutes: 10 Minutes  Minutes: 22          Date: 2025  Patient Name: Elli Coulter,   Gender: female      MRN: [id number]  : 1955  (69 y.o.)  Referring Practitioner: Chang Henry DO  Diagnosis: Cellulitis  Additional Pertinent Hx: Per EMR: 69 y.o. female who presents with complaints of flank pain and drainage/redness around left nephrostomy tube.  History of recurrent perinephric fluid collection s/p left nephrectomy.  Recent admission from 1/15-, with similar complaint and had drain placed by IR.  Peritoneal fluid showed Proteus and patient was discharged on Augmentin back to SNF with outpatient urology follow-up.  Was seen in the ER on  due to increased pain at drain site, per ED note was noted to have drainage that was purulent.  However CT had showed resolution of perinephric abscess and as patient's pain improved she was discharged with Percocet.  Presented back to ER on evening of  again due to complaints of pain around drain site as well as increased redness and drainage.  Repeat CT now showing soft tissue edema and worsening cellulitis, no abscess.  Was started on Rocephin and vancomycin in ER.  Hospitalist contacted for admission.  Upon my exam, patient is resting in bed.  She tells me that she started to notice increased pain around drain site approximately a week ago.  Upon evaluation, noted to have significant erythema and warmth with a large amount of purulent drainage around drain tube.  See media.  Denies any other abdominal pain, chest pain, shortness of breath.    Restrictions/Precautions:  Restrictions/Precautions: Isolation  Position Activity 
Patient wants txs PRN patient refused tx at time of assessment      
Pharmacy Medication History Note    List of current medications patient is taking is complete.    Source of information: [facility] list    Changes made to medication list:  Medications removed (include reason, ex. therapy complete or physician discontinued):  Acetaminophen - not active on ECF list  Linzess - discontinued on 2/4/25  Quetiapine - discontinued 2/7/25 at ECF    Medications added/doses adjusted:  Changed docusate 100mg from BID prn to BID scheduled  Changed fluticasone NS from 1 spray BID scheduled to BID prn  Added Humalog sliding scale  Changed loperamide to 5 times a day prn  Changed lidoderm patch to prn  Clarified melatonin as 6mg nightly  Changed diclofenac gel to bid prn from bid scheduled  Added cefadroxil 500mg BID for 10 days from 2/7/25 to 2/17/25  Added Depakote 125mg BID  Added hydrocodone/APAP 5/325 1 tab q4h prn pain    Other notes (ex. Recent course of antibiotics, Coumadin dosing):  none        Electronically signed by Cha Briseno RPH on 2/11/2025 at 8:14 AM      
[facility]  INPATIENT PHYSICAL THERAPY  EVALUATION  [facility] CCU 3A - 3A-05/005-A    Discharge Recommendations: Continue to assess pending progress, return to [facility]/SNF  Equipment Recommendations: No             Time In: 1417  Time Out: 1434  Timed Code Treatment Minutes: 9 Minutes  Minutes: 17          Date: 2/10/2025  Patient Name: Elli Coulter,  Gender:  female        MRN: [id number]  : 1955  (69 y.o.)      Referring Practitioner: Chang Henry DO  Diagnosis: Cellulitis  Additional Pertinent Hx: Per EMR \"The patient is a 69 y.o. female who presents with complaints of flank pain and drainage/redness around left nephrostomy tube.  History of recurrent perinephric fluid collection s/p left nephrectomy.  Recent admission from 1/15-, with similar complaint and had drain placed by IR.  Peritoneal fluid showed Proteus and patient was discharged on Augmentin back to SNF with outpatient urology follow-up.  Was seen in the ER on  due to increased pain at drain site, per ED note was noted to have drainage that was purulent.  However CT had showed resolution of perinephric abscess and as patient's pain improved she was discharged with Percocet.  Presented back to ER on evening of  again due to complaints of pain around drain site as well as increased redness and drainage.  Repeat CT now showing soft tissue edema and worsening cellulitis, no abscess.  Was started on Rocephin and vancomycin in ER.  Hospitalist contacted for admission.  Upon my exam, patient is resting in bed.  She tells me that she started to notice increased pain around drain site approximately a week ago.  Upon evaluation, noted to have significant erythema and warmth with a large amount of purulent drainage around drain tube.  See media.  Denies any other abdominal pain, chest pain, shortness of breath.     Restrictions/Precautions:  Restrictions/Precautions: Isolation       Other Position/Activity Restrictions: COVID 
Report called to Charo at the [facility]. AVS and last dose mar sent with patient to [facility]. Patient transported by wheelchair by [facility] ems.   
[facility]  OCCUPATIONAL THERAPY MISSED TREATMENT NOTE  [facility] CCU 3A  3A-05/005-A      Date: 2/10/2025  Patient Name: Elli Coulter        CSN: [id number]   : 1955  (69 y.o.)  Gender: female                REASON FOR MISSED TREATMENT:  OT attempted at this time, although pt declined due to fatigue. Despite max encouragement, pt continued to decline. Will check back as able                
Spiritual Health History and Assessment/Progress Note  [facility]    Attempted Encounter,  ,  ,      Name: Elli Coulter MRN: [id number]    Age: 69 y.o.     Sex: female   Language: English   [employer]: Non-[employer]ian   Cellulitis     Date: 2/12/2025            Total Time Calculated: (P) 5 min              Spiritual Assessment began in [facility] CCU 3A        Referral/Consult From: Rounding   Encounter Overview/Reason: Attempted Encounter  Service Provided For: Patient    Jayde, Belief, Meaning:   Patient unable to assess at this time  Family/Friends No family/friends present      Importance and Influence:  Patient unable to assess at this time  Family/Friends No family/friends present    Community:  Patient feels well-supported. Support system includes: Extended family  Family/Friends No family/friends present    Assessment and Plan of Care:   During my encounter with the 69 yr old patient, I attempted to visit with the pt on 3A. The patient appears to be resting (not responsive/resting) now and I didn't want to disturb the patient. I or another  will attempt to visit the patient or the family at another time. The pt was admitted due to cellulitis.     Patient Interventions include: Other: Prayer  Family/Friends Interventions include: No family/friends present    Patient Plan of Care: Other: visit per request of pt  Family/Friends Plan of Care: No family/friends present    Electronically signed by JUSTIN Rg on 2/12/2025 at 12:16 PM   
[facility]--HOSPITALIST GROUP    Hospitalist PROGRESS NOTE dictated by Magda Glynn MD on 2025    Patient ID: Elli Coulter is 69 y.o. and presently in room -A  : 1955 MRN: [id number]    Admit date: 2025  Primary Care Physician: Ryan Montalvo MD   Patient Care Team:  Ryan Montalvo MD as PCP - General (Family Medicine)  Tel: [phone]  Admitting Physician: Sedrick Thayer MD   Code Status:  Full Code    Elli Coulter is a 69 y.o.  female who presented with Abscess    Room: -  Admit date: 2025    History of Present Illness: The patient is a 69 y.o. female who presents with complaints of flank pain and drainage/redness around left nephrostomy tube.  History of recurrent perinephric fluid collection s/p left nephrectomy.  Recent admission from 1/15-, with similar complaint and had drain placed by IR.  Peritoneal fluid showed Proteus and patient was discharged on Augmentin back to [facility] with outpatient urology follow-up.  Was seen in the ER on  due to increased pain at drain site, per ED note was noted to have drainage that was purulent.  However CT had showed resolution of perinephric abscess and as patient's pain improved she was discharged with Percocet.  Presented back to ER on evening of  again due to complaints of pain around drain site as well as increased redness and drainage.  Repeat CT now showing soft tissue edema and worsening cellulitis, no abscess.  Was started on Rocephin and vancomycin in ER.  Hospitalist contacted for admission.  Upon my exam, patient is resting in bed.  She tells me that she started to notice increased pain around drain site approximately a week ago.  Upon evaluation, noted to have significant erythema and warmth with a large amount of purulent drainage around drain tube.  See media.  Denies any other abdominal pain, chest pain, shortness of breath.  -----------    2/10/2025: 
[facility]  PHYSICAL THERAPY MISSED TREATMENT NOTE  [facility] CCU 3A    Date: 2025  Patient Name: Elli Coulter        MRN: [id number]   : 1955  (69 y.o.)  Gender: female   Referring Practitioner: Chang Henry DO            REASON FOR MISSED TREATMENT:  Clearance from RN to see pt this date. Pt was semi-supine in bed when PT arrived. Pt reports not having adequate sleep and refuses participation in PT intervention at this time. PT to reattempt at later date/time .      Yoana Cabrera, PT, DPT        
[facility]  [facility] CCU 3A  Occupational Therapy  Daily Note    Discharge Recommendations: Subacute/skilled nursing facility  Equipment Recommendations: No (defer to next level of care)        Time In: 904  Time Out: 929  Timed Code Treatment Minutes: 25 Minutes  Minutes: 25          Date: 2025  Patient Name: Elli Coulter,   Gender: female      Room: [address] [place] [address]  MRN: [id number]  : 1955  (69 y.o.)  Referring Practitioner: Chang Henry DO  Diagnosis: Cellulitis  Additional Pertinent Hx: Per EMR: 69 y.o. female who presents with complaints of flank pain and drainage/redness around left nephrostomy tube.  History of recurrent perinephric fluid collection s/p left nephrectomy.  Recent admission from 1/15-, with similar complaint and had drain placed by IR.  Peritoneal fluid showed Proteus and patient was discharged on Augmentin back to SNF with outpatient urology follow-up.  Was seen in the ER on  due to increased pain at drain site, per ED note was noted to have drainage that was purulent.  However CT had showed resolution of perinephric abscess and as patient's pain improved she was discharged with Percocet.  Presented back to ER on evening of  again due to complaints of pain around drain site as well as increased redness and drainage.  Repeat CT now showing soft tissue edema and worsening cellulitis, no abscess.  Was started on Rocephin and vancomycin in ER.  Hospitalist contacted for admission.  Upon my exam, patient is resting in bed.  She tells me that she started to notice increased pain around drain site approximately a week ago.  Upon evaluation, noted to have significant erythema and warmth with a large amount of purulent drainage around drain tube.  See media.  Denies any other abdominal pain, chest pain, shortness of breath.    Restrictions/Precautions:  Restrictions/Precautions: Isolation  Position Activity Restriction  Other Position/Activity Restrictions: COVID + 
Patient states has some cough productive of grayish sputum on questioning.  No chest pain or shortness of breath.    Left flank region cellulitis at site of nephrostomy tube and on Rocephin and vancomycin since 2/8/2025 per ID.  Initial cultures revealing Proteus species and Staphylococcus coagulase positive.    Urine culture positive for Proteus mirabilis and gram-negative bacilli less than 50 K.  ---------    2/11/2025: Patient continues to have some cough productive of grayish sputum.  Also has some chest pain secondary to cough.  No shortness of breath.    Urine cultures positive for Proteus mirabilis and Klebsiella pneumonia less than 50K.    Patient is on Rocephin and vancomycin per ID since 2/8/2025 for left flank region cellulitis.  Left nephrostomy site cultures reveal Proteus mirabilis and MRSA.  -------    2/12/2025: Patient COVID-19 positive PTA, has left flank region cellulitis at site of left nephrostomy tube.  Patient states has cough productive of yellow sputum.  Breathing stable.  No chest pain.    Left nephrostomy site cultures reveal Proteus mirabilis and MRSA.    Rocephin and vancomycin per ID since 2/8/2025 for left flank region cellulitis.  -------    2/13/2025: Patient COVID-19 positive PTA, has left flank region cellulitis at site of left nephrostomy tube.  Patient's states still has cough productive of yellow sputum.  Some chest pain secondary to cough.  Breathing stable.    Left nephrostomy site cultures reveal Proteus mirabilis and MRSA.    Rocephin and vancomycin per ID since 2/8/2025 for left flank region cellulitis.    WBC 5.6, hemoglobin 10.3.  ---------    2/14/2025: Patient apparently from assisted living facility COVID-19 positive PTA and left flank region cellulitis site of left nephrostomy tube. Today cough productive of yellow sputum and shortness of breath on exertion.  Denies chest pain.  O2 sat 98% on room air.    Patient is on Rocephin and vancomycin per ID since 2/8/2025 for 
retention risk).    5. Chronic Kidney Disease (Likely CKD 3)  No acute kidney injury.  Plan:  Monitor renal function & electrolytes.    6. Hypertension  Now on Amlodipine 10 mg daily.  Plan:  Monitor BP for response to adjustment.    7. COPD (No Acute Exacerbation)  Mild productive cough.  Plan:  Continue Albuterol HFA PRN.  Monitor for increased SOB/wheezing.    8. GERD  On chronic PPI therapy.  Plan:  Continue Pantoprazole 40 mg BID.    Level of care: [x]Step Down / []Med-Surg  Bed Status: [x]Inpatient / []Observation  Telemetry: [x]Yes / []No  PT/OT: [x]Yes / []No    DVT Prophylaxis: [] Lovenox / [] Heparin / [] SCDs / [] Already on Systemic Anticoagulation / [] None     Expected discharge date:  tbd  Disposition: tbd     Code status: Full Code     ===================================================================    Chief Complaint: Abdominal pain       Subjective (past 24 hours):     - NAEO  - Patient is afebrile, HDS     Medications:    Infusion Medications    sodium chloride      dextrose      Scheduled Medications    albuterol sulfate HFA  2 puff Inhalation TID    miconazole   Topical BID    amoxicillin-clavulanate  1 tablet Oral 3 times per day    doxycycline hyclate  100 mg Oral 2 times per day    divalproex  125 mg Oral BID    levothyroxine  150 mcg Oral Every Sunday    levothyroxine  75 mcg Oral Once per day on Monday Tuesday Wednesday Thursday Friday Saturday    amLODIPine  10 mg Oral Daily    insulin glargine  10 Units SubCUTAneous Nightly    atorvastatin  40 mg Oral Nightly    buPROPion  300 mg Oral QAM    carvedilol  3.125 mg Oral BID    escitalopram  20 mg Oral Daily    fenofibrate  160 mg Oral Daily    pantoprazole  40 mg Oral BID AC    rOPINIRole  0.5 mg Oral Nightly    traZODone  200 mg Oral Nightly    sodium chloride flush  5-40 mL IntraVENous 2 times per day    enoxaparin  40 mg SubCUTAneous Daily    insulin lispro  0-4 Units SubCUTAneous 4x Daily AC & HS    PRN Meds: 
   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    Chronic midline low back pain without sciatica M54.50, G89.29    Coronary vasospasm (HCC) I20.1    Derangement of knee M23.90    Disturbance of skin sensation R20.9    Glaucoma suspect H40.009    Large liver R16.0    Lesion of ulnar nerve G56.20    Nicotine dependence F17.200    Pancreatic insufficiency K86.89    Primary osteoarthritis involving multiple joints M15.0    Sciatica M54.30    Type 2 diabetes mellitus with insulin therapy ([facility]) E11.9, Z79.4    Urinary incontinence, overflow N39.490    Perinephric fluid collection N28.89    Vitamin B6 deficiency (non anemic) E53.1    Chronic anemia D64.9    CAD (coronary artery disease) I25.10    Physical deconditioning R53.81    Metabolic encephalopathy G93.41    Hyperlipidemia E78.5    Esophagitis K20.90    Diverticulosis K57.90    Hepatic steatosis K76.0    Iron deficiency anemia D50.9    Retroperitoneal fluid collection R18.8    Psoas abscess (HCC) K68.12    Constipation K59.00    Vitamin deficiency E56.9    Lower abdominal pain R10.30    Renal abscess N15.1    Leukocytosis D72.829    Shortness of breath R06.02    Cellulitis L03.90    Cellulitis and abscess of trunk L03.319, L02.219         ASSESSMENT/PLAN   -Left flank wall cellulitis: improved  -Recent COVID infection: she has cough  On oral antibiotic  Discharge plan      
18  AM-PAC Inpatient without Stair Climbing T-Scale Score : 51.97     Modified [place]:  Current Functional Status:  Not Applicable    ASSESSMENT:  Assessment: Patient progressing toward established goals.  Activity Tolerance:  Patient tolerance of  treatment:Good.  Plan: Current Treatment Recommendations: Strengthening, Balance training, Gait training, Functional mobility training, Transfer training, Neuromuscular re-education, Endurance training, Equipment evaluation, education, & procurement, Patient/Caregiver education & training, Safety education & training, Home exercise program, Therapeutic activities  General Plan:  (2-3x GM)    Education:  Learners: Patient  Patient Education: Plan of Care, Home Exercise Program    Goals:  Patient Goals : return home  Short Term Goals  Time Frame for Short Term Goals: by discharge  Short Term Goal 1: Pt will demo sit to/from stand transfers with IND with no AD to return home safely.  Short Term Goal 2: Pt will amb for >150 feet with no AD and IND to return home safely.  Long Term Goals  Time Frame for Long Term Goals : NA due to short ELOS    Following session, patient left in safe position with all fall risk precautions in place.     Radha Cisneros, MPT [id number]    
Was admitted to [facility] January 15 to 20, 2025.  Percutaneous drain placed on 1/16.  Blood cultures growing Proteus.  Patient discharged on Augmentin.  Since then, has noted increased swelling, erythema, calor to her left flank.  CT abdomen pelvis showing soft tissue swelling with suggestion of cellulitis but no bowen abscess.  Drain removed at bedside 2/9/2025.  On review of CT, no bowen abscess.  Will hold off on replacing drain. Wound cultures collected, growth of Proteus, which is same organism growing from perinephric/retroperitoneal abscess.  Currently on ceftriaxone and vancomycin per ID recommendations.  Review of Proteus from cultures drawn on 1/16/2025 show that is ceftriaxone sensitive.  Hyponatremia  COVID infection PTA  Type 2 diabetes  Patient has history of type 2 diabetes with A1c of 8.3 from August 2024.  Home regimen includes Lantus 14 units nightly, pioglitazone 15 mg daily, prandial insulin  Hold home agents.  Patient somewhat hypoglycemic.  Will reduce dose of Lantus to 10 units nightly.  Additionally, given patient's history of HFpEF, would recommend outpatient transition off TZD agent  Chronic diastolic heart failure  Hypertension  Hyperlipidemia  Hypothyroidism  COPD without exacerbation  Restless leg syndrome  Anxiety/depression:  Seroquel, trazodone, Wellbutrin  GERD  Negative MRSA 2/10/2025.      Plan:    Increased amlodipine 10 mg p.o. daily.  On Rocephin and Vancomycin per ID.  ADULT DIET; Regular; 4 carb choices (60 gm/meal); Low Fat/Low Chol/High Fiber/LETICIA  Continuous Infusions:    sodium chloride      dextrose       divalproex, 125 mg, BID  [START ON 2/16/2025] levothyroxine, 150 mcg, Every Sunday  levothyroxine, 75 mcg, Once per day on Monday Tuesday Wednesday Thursday Friday Saturday  amLODIPine, 10 mg, Daily  vancomycin, 1,250 mg, Q12H  insulin glargine, 10 Units, Nightly  atorvastatin, 40 mg, Nightly  buPROPion, 300 mg, QAM  carvedilol, 3.125 mg, BID  escitalopram, 20 mg, 
increasing patient's UB strength through completion of upper extremity  theraband exercises using  resistance band. Pt completed pull aparts, shoulder flexion, elbow extension, horizontal  abduction and chest press x10 reps each while seated for increased strength and activity tolerance to support Adls. .      Functional Outcome Measures:   AM-PAC Inpatient Daily Activity Raw Score: 22     Modified [place]:  Current Functional Status:  Not Applicable    ASSESSMENT:     Activity Tolerance:  Patient tolerance of  treatment: Good treatment tolerance      Plan: Times Per Week: 2-3x  Current Treatment Recommendations: Strengthening, Balance training, Functional mobility training, Endurance training, Safety education & training, Patient/Caregiver education & training, Self-Care / ADL    Education:  Learners: Patient  ADL's and Home Exercise Program    Goals  Short Term Goals  Time Frame for Short Term Goals: by discharge  Short Term Goal 1: Pt will complete ADL transfer w/ SUP via LRD.  Short Term Goal 2: Pt will complete LE dressing w/ SUP assist and LHAD PRN.  Short Term Goal 3: Pt will complete dynamic standing task >10 min for improved sinkside grooming tolerance.  Short Term Goal 4: Pt will complete functional mobility at household distances with 0-2 cues for safety awareness and technique.  Long Term Goals  Time Frame for Long Term Goals : No LTGs set 2/2 short ELOS    Following session, patient left in safe position with all fall risk precautions in place.       
(started 2/8)  -Left nephrostomy site culture showing proteus and staph aureus. Culture showing oxacillin resistance for Staph aureus.   -Urine culture showing proteus mirabilis and klebsiella pneumoniae    Electronically signed by Dr. Darrick Zheng MD on 2/11/2025 at 8:26 AM.    Case and plan reviewed and discussed with Dr. Virk    This report has been created using voice recognition software. It may contain minor errors which are inherent in voice recognition technology.  Patient was seen and examined face-to-face by me  The chart, progress notes, labs and radiographs were reviewed.   Case discussed with the nurse. Questions and concerns were addressed.  I agree with the progress note.      
displaced (HCC), Paresthesias, Pneumonia, Polysubstance abuse (HCC), Renal hematoma, left, Right arm weakness, Seasonal allergies, Sexual problems, Substance-induced psychotic disorder (HCC), Suicidal thoughts, Thyroid disease, Tobacco abuse, Tobacco use disorder, Urinary incontinence, Urinary tract infection in female, Vomiting, Wears dentures, and Wears glasses.    SURGICAL HISTORY      has a past surgical history that includes Mandible fracture surgery (1984); shoulder surgery (2014); Colonoscopy (2011); Dilatation, esophagus; Elbow surgery (Right, 10/7/2004); Rotator cuff repair (2004, 2014); skin biopsy (2006); Cholecystectomy, laparoscopic (6/12/15); Elbow surgery (Bilateral, 2016); Carpal tunnel release (Bilateral, 2016); Hysterectomy (1998); cystoscopy w biopsy of bladder (N/A, 7/13/2020); Stimulator Surgery (N/A, 11/4/2020); Stimulator Surgery (N/A, 11/23/2020); Abdomen surgery; Cystoscopy (N/A, 2/10/2022); Cardiac surgery; Stimulator Surgery (N/A, 4/14/2022); EGD; Cystoscopy (N/A, 12/27/2022); Ureter surgery (Left, 1/12/2023); Cystoscopy (N/A, 8/22/2023); and CT ABSCESS DRAINAGE SOFT TISSUE (1/11/2024).    CURRENT MEDICATIONS     vancomycin, 1,250 mg, Q12H  amLODIPine, 5 mg, Daily  insulin glargine, 10 Units, Nightly  atorvastatin, 40 mg, Nightly  buPROPion, 300 mg, QAM  carvedilol, 3.125 mg, BID  escitalopram, 20 mg, Daily  fenofibrate, 160 mg, Daily  folic acid, 1 mg, Daily  levothyroxine, 75 mcg, QAM AC  linaclotide, 145 mcg, QAM AC  pantoprazole, 40 mg, BID AC  QUEtiapine, 200 mg, Nightly  rOPINIRole, 0.5 mg, Nightly  traZODone, 200 mg, Nightly  sodium chloride flush, 5-40 mL, 2 times per day  enoxaparin, 40 mg, Daily  insulin lispro, 0-4 Units, 4x Daily AC & HS  cefTRIAXone (ROCEPHIN) IV, 1,000 mg, Q24H  vancomycin (VANCOCIN) intermittent dosing (placeholder), , RX Placeholder        Continuous Infusions:    sodium chloride      dextrose         PRN:  albuterol, 2.5 mg, Q6H PRN  fluticasone, 1 spray, 
40 mg, Nightly  buPROPion, 300 mg, QAM  carvedilol, 3.125 mg, BID  escitalopram, 20 mg, Daily  fenofibrate, 160 mg, Daily  folic acid, 1 mg, Daily  pantoprazole, 40 mg, BID AC  rOPINIRole, 0.5 mg, Nightly  traZODone, 200 mg, Nightly  sodium chloride flush, 5-40 mL, 2 times per day  enoxaparin, 40 mg, Daily  insulin lispro, 0-4 Units, 4x Daily AC & HS  cefTRIAXone (ROCEPHIN) IV, 1,000 mg, Q24H  vancomycin (VANCOCIN) intermittent dosing (placeholder), , RX Placeholder        Continuous Infusions:    sodium chloride      dextrose         PRN:  albuterol, 2.5 mg, Q6H PRN  fluticasone, 1 spray, BID PRN  oxyCODONE-acetaminophen, 1 tablet, Q6H PRN  sodium chloride flush, 5-40 mL, PRN  sodium chloride, , PRN  potassium chloride, 40 mEq, PRN   Or  potassium alternative oral replacement, 40 mEq, PRN   Or  potassium chloride, 10 mEq, PRN  magnesium sulfate, 2,000 mg, PRN  ondansetron, 4 mg, Q8H PRN   Or  ondansetron, 4 mg, Q6H PRN  polyethylene glycol, 17 g, Daily PRN  acetaminophen, 650 mg, Q6H PRN   Or  acetaminophen, 650 mg, Q6H PRN  glucose, 4 tablet, PRN  dextrose bolus, 125 mL, PRN   Or  dextrose bolus, 250 mL, PRN  glucagon (rDNA), 1 mg, PRN  dextrose, , Continuous PRN          HOME MEDICATIONS     Medications Prior to Admission: docusate sodium (COLACE) 100 MG capsule, Take 1 capsule by mouth 2 times daily  insulin glargine (LANTUS SOLOSTAR) 100 UNIT/ML injection pen, Inject 14 Units into the skin nightly  levothyroxine (SYNTHROID) 150 MCG tablet, Take 1 tablet by mouth once a week On Sundays  lidocaine (HM LIDOCAINE PATCH) 4 % external patch, Place 1 patch onto the skin daily as needed (pain) On in the morning, off in the evenimg  melatonin 3 MG TABS tablet, Take 2 tablets by mouth nightly  cefadroxil (DURICEF) 500 MG capsule, Take 1 capsule by mouth 2 times daily  divalproex (DEPAKOTE) 125 MG DR tablet, Take 1 tablet by mouth 2 times daily  HYDROcodone-acetaminophen (NORCO) 5-325 MG per tablet, Take 1 tablet by mouth 
\"BILIRUBINUR\", \"UROBILINOGEN\", \"KETUA\", \"LABCAST\", \"LABCASTTY\", \"AMORPHOS\" in the last 72 hours.    Invalid input(s): \"CRYSTALS\"    Micro:   Lab Results   Component Value Date/Time    BC No growth 24 hours. No growth 48 hours. 02/08/2025 07:40 AM         No results for input(s): \"INR\" in the last 72 hours.  Lab Results   Component Value Date    DDIMER < 215.00 07/09/2024     Lab Results   Component Value Date     (H) 12/12/2019     troponins  Lab Results   Component Value Date    TROPONINI 0.02 12/12/2019         D Dimer: No results for input(s): \"DDIMER\" in the last 72 hours.  Troponin T No results for input(s): \"TROPONINT\" in the last 72 hours.  ProBNP   No results found for requested labs within last 30 days.     ProBNP Invalid input(s): \"PRO-BNP\"  Lactic acid:Invalid input(s): \"LACTIC ACID\"      PT/INR: No results for input(s): \"PROTIME\", \"INR\" in the last 72 hours.  APTT: No results for input(s): \"APTT\" in the last 72 hours.      ESR:   Lab Results   Component Value Date    SEDRATE 85 (H) 10/23/2023     CRP:   Lab Results   Component Value Date    CRP 26.04 (H) 02/07/2025     Folate and B12:   Lab Results   Component Value Date    [id number] 374 09/13/2024   ,   Lab Results   Component Value Date    FOLATE 15.3 09/13/2024     Thyroid Studies:   Lab Results   Component Value Date    TSH 4.660 (H) 10/14/2024     D-Dimer:   Lab Results   Component Value Date    DDIMER < 215.00 07/09/2024       Lactic acid: No components found for: \"LACT\"     Troponin T   No results for input(s): \"TROPHS\" in the last 72 hours.      Troponins:     No components found for: \"TROP\"    Lab Results   Component Value Date    TROPONINI 0.02 12/12/2019       No results found for requested labs within last 30 days.    Cardiac Enzymes:    No results found for requested labs within last 30 days.    Lab Results   Component Value Date    CKMB 4.2 07/23/2011       ProBNP:    No components found for: \"NTBNP\"      Urine Sodium:   No 
No results for input(s): \"APTT\" in the last 72 hours.      ESR:   Lab Results   Component Value Date    SEDRATE 85 (H) 10/23/2023     CRP:   Lab Results   Component Value Date    CRP 26.04 (H) 02/07/2025     Folate and B12:   Lab Results   Component Value Date    [id number] 374 09/13/2024   ,   Lab Results   Component Value Date    FOLATE 15.3 09/13/2024     Thyroid Studies:   Lab Results   Component Value Date    TSH 4.660 (H) 10/14/2024     D-Dimer:   Lab Results   Component Value Date    DDIMER < 215.00 07/09/2024       Lactic acid: No components found for: \"LACT\"     Troponin T   No results for input(s): \"TROPHS\" in the last 72 hours.      Troponins:     No components found for: \"TROP\"    Lab Results   Component Value Date    TROPONINI 0.02 12/12/2019       No results found for requested labs within last 30 days.    Cardiac Enzymes:    No results found for requested labs within last 30 days.    Lab Results   Component Value Date    CKMB 4.2 07/23/2011       ProBNP:    No components found for: \"NTBNP\"      Urine Sodium:   No components found for: \"MARIOLA\"  Urine Potassium:  No results found for: \"KUR\"  Urine Chloride:  No results found for: \"CLUR\"  Urine Osmolarity:   Lab Results   Component Value Date/Time    OSMOU 424 08/08/2024 07:00 PM     Urine Protein:   No results found for: \"TPU\"  Urine Creatinine:   No results found for: \"LABCREA\"  Urine Eosinophils:  No components found for: \"UEOS\"    Thyroid Studies:   No results found for: \"T4\"  No results found for: \"T3\"  Lab Results   Component Value Date/Time    TSH 4.660 10/14/2024 06:28 AM       Lab Results   Component Value Date    TSH 4.660 (H) 10/14/2024       HbA1c  No components found for: \"HA1CC\"    Lipids:  Lab Results   Component Value Date    HDL 36 09/26/2022    LDL 88 09/26/2022         CRP:   Lab Results   Component Value Date    CRP 26.04 (H) 02/07/2025     ESR:   Lab Results   Component Value Date    SEDRATE 85 (H) 10/23/2023       Folate and B12:

## 2025-02-18 NOTE — CARE COORDINATION
2/18/25, 10:11 AM EST    DISCHARGE PLANNING EVALUATION    [facility] will accept under medicaid, will accept today.  Transport available at 3:30 pm.   Spoke with gamal, who is in agreement with plan for discharge today.    2/18/25, 2:12 PM EST    Patient goals/plan/ treatment preferences discussed by  and .  Patient goals/plan/ treatment preferences reviewed with patient/ family.  Patient/ family verbalize understanding of discharge plan and are in agreement with goal/plan/treatment preferences.  Understanding was demonstrated using the teach back method.  AVS provided by RN at time of discharge, which includes all necessary medical information pertaining to the patients current course of illness, treatment, post-discharge goals of care, and treatment preferences.     Services At/After Discharge: Skilled Nursing Facility (SNF) and In ambulette

## 2025-02-18 NOTE — PLAN OF CARE
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 She was recently admitted (1/15-1/20) for a similar issue, underwent drain placement by IR, and was discharged on Augmentin. She returned to the ER on 2/6 with increased pain and drainage, but CT showed resolution of the abscess, and she was discharged with Percocet. She presented again on 2/7 with worsening symptoms, and repeat CT revealed soft tissue edema consistent with cellulitis but no abscess. She was started on Rocephin and vancomycin in the ED. Exam showed significant erythema, warmth, and purulent drainage at the drain site. WBC and lactic acid were normal, but CRP (26) and procalcitonin (0.19) were elevated. Fluid culture from 1/16 had previously grown Proteus, and a new culture was obtained. Infectious Disease and Urology were consulted, with ID noting infection tracking around the catheter site, recommending continued IV antibiotics with possible drain removal and surgical exploration if needed. The patient also tested positive for COVID-19, reporting symptoms for the past 10 days. Additionally, she was found to have hyponatremia (Na 129, corrected to 132 with hyperglycemia), which was present during her prior admission. She received a 1L fluid bolus in the ER, and BMP will be repeated in the morning. Further management includes close monitoring, antibiotic adjustment per culture results, and potential procedural intervention if indicated.   
  Problem: Chronic Conditions and Co-morbidities  Goal: Patient's chronic conditions and co-morbidity symptoms are monitored and maintained or improved  2/10/2025 1924 by Johnny Fonseca RN  Outcome: Progressing  2/10/2025 1559 by Tosha Mckeon RN  Outcome: Progressing     Problem: Discharge Planning  Goal: Discharge to home or other facility with appropriate resources  2/10/2025 1924 by Johnny Fonseca RN  Outcome: Progressing  2/10/2025 1559 by Tosha Mckeon RN  Outcome: Progressing     Problem: Pain  Goal: Verbalizes/displays adequate comfort level or baseline comfort level  2/10/2025 1924 by Johnny Fonseca RN  Outcome: Progressing  Flowsheets (Taken 2/10/2025 1919)  Verbalizes/displays adequate comfort level or baseline comfort level: Encourage patient to monitor pain and request assistance  2/10/2025 1559 by Tosha Mckeon RN  Outcome: Progressing     Problem: Skin/Tissue Integrity  Goal: Skin integrity remains intact  Description: 1.  Monitor for areas of redness and/or skin breakdown  2.  Assess vascular access sites hourly  3.  Every 4-6 hours minimum:  Change oxygen saturation probe site  4.  Every 4-6 hours:  If on nasal continuous positive airway pressure, respiratory therapy assess nares and determine need for appliance change or resting period  2/10/2025 1924 by Johnny Fonseca RN  Outcome: Progressing  2/10/2025 1559 by Tosha Mckeon RN  Outcome: Progressing     Problem: Safety - Adult  Goal: Free from fall injury  2/10/2025 1924 by Johnny Fonseca RN  Outcome: Progressing  2/10/2025 1559 by Tosha Mckeon RN  Outcome: Progressing     
  Problem: Chronic Conditions and Co-morbidities  Goal: Patient's chronic conditions and co-morbidity symptoms are monitored and maintained or improved  2/15/2025 1745 by Haylee Patel, RN  Outcome: Progressing  Flowsheets (Taken 2/15/2025 1745)  Care Plan - Patient's Chronic Conditions and Co-Morbidity Symptoms are Monitored and Maintained or Improved:   Monitor and assess patient's chronic conditions and comorbid symptoms for stability, deterioration, or improvement   Collaborate with multidisciplinary team to address chronic and comorbid conditions and prevent exacerbation or deterioration  2/15/2025 0443 by Kamilah Pinto RN  Outcome: Progressing     Problem: Discharge Planning  Goal: Discharge to home or other facility with appropriate resources  2/15/2025 1745 by Hayele Patel, RN  Outcome: Progressing  Flowsheets (Taken 2/15/2025 1745)  Discharge to home or other facility with appropriate resources:   Identify barriers to discharge with patient and caregiver   Identify discharge learning needs (meds, wound care, etc)   Refer to discharge planning if patient needs post-hospital services based on physician order or complex needs related to functional status, cognitive ability or social support system   Arrange for needed discharge resources and transportation as appropriate  2/15/2025 0507 by Kamilah Pinto RN  Outcome: Progressing  2/15/2025 0443 by Kamilah Pinto RN  Outcome: Progressing     Problem: Pain  Goal: Verbalizes/displays adequate comfort level or baseline comfort level  2/15/2025 1745 by Haylee Patel, RN  Outcome: Progressing  Flowsheets (Taken 2/15/2025 1745)  Verbalizes/displays adequate comfort level or baseline comfort level:   Encourage patient to monitor pain and request assistance   Administer analgesics based on type and severity of pain and evaluate response   Consider cultural and social influences on pain and pain management   Assess pain using appropriate pain scale   
  Problem: Chronic Conditions and Co-morbidities  Goal: Patient's chronic conditions and co-morbidity symptoms are monitored and maintained or improved  2/16/2025 1258 by Consuelo Merrill RN  Outcome: Progressing  Flowsheets (Taken 2/16/2025 1258)  Care Plan - Patient's Chronic Conditions and Co-Morbidity Symptoms are Monitored and Maintained or Improved: Monitor and assess patient's chronic conditions and comorbid symptoms for stability, deterioration, or improvement     Problem: Discharge Planning  Goal: Discharge to home or other facility with appropriate resources  2/16/2025 1258 by Consuelo Merrill RN  Outcome: Progressing  Flowsheets (Taken 2/16/2025 1258)  Discharge to home or other facility with appropriate resources: Identify barriers to discharge with patient and caregiver     Problem: Pain  Goal: Verbalizes/displays adequate comfort level or baseline comfort level  2/16/2025 1258 by Consuelo Merrill RN  Outcome: Progressing  Flowsheets (Taken 2/16/2025 1258)  Verbalizes/displays adequate comfort level or baseline comfort level: Encourage patient to monitor pain and request assistance     Problem: Skin/Tissue Integrity  Goal: Skin integrity remains intact  Description: 1.  Monitor for areas of redness and/or skin breakdown  2.  Assess vascular access sites hourly  3.  Every 4-6 hours minimum:  Change oxygen saturation probe site  4.  Every 4-6 hours:  If on nasal continuous positive airway pressure, respiratory therapy assess nares and determine need for appliance change or resting period  2/16/2025 1258 by Consuelo Merrill RN  Outcome: Progressing  Flowsheets (Taken 2/16/2025 1258)  Skin Integrity Remains Intact: Monitor for areas of redness and/or skin breakdown     Problem: Safety - Adult  Goal: Free from fall injury  Outcome: Progressing  Flowsheets (Taken 2/16/2025 1258)  Free From Fall Injury: Instruct family/caregiver on patient safety   Care plan reviewed with patient.  Patient verbalize understanding of 
  Problem: Chronic Conditions and Co-morbidities  Goal: Patient's chronic conditions and co-morbidity symptoms are monitored and maintained or improved  2/17/2025 0030 by Brooklyn Pace RN  Outcome: Progressing  2/16/2025 1258 by Consuelo Merrill RN  Outcome: Progressing  Flowsheets (Taken 2/16/2025 1258)  Care Plan - Patient's Chronic Conditions and Co-Morbidity Symptoms are Monitored and Maintained or Improved: Monitor and assess patient's chronic conditions and comorbid symptoms for stability, deterioration, or improvement     Problem: Pain  Goal: Verbalizes/displays adequate comfort level or baseline comfort level  2/17/2025 0030 by Brooklyn Pace RN  Outcome: Progressing  2/16/2025 1258 by Consuelo Merrill RN  Outcome: Progressing  Flowsheets (Taken 2/16/2025 1258)  Verbalizes/displays adequate comfort level or baseline comfort level: Encourage patient to monitor pain and request assistance     Problem: Discharge Planning  Goal: Discharge to home or other facility with appropriate resources  2/17/2025 0030 by Brooklyn Pace RN  Outcome: Progressing  2/16/2025 1258 by Consuelo Merrill RN  Outcome: Progressing  Flowsheets (Taken 2/16/2025 1258)  Discharge to home or other facility with appropriate resources: Identify barriers to discharge with patient and caregiver   Care plan reviewed with patient.  Patient verbalize understanding of the plan of care and contribute to goal setting.    
  Problem: Chronic Conditions and Co-morbidities  Goal: Patient's chronic conditions and co-morbidity symptoms are monitored and maintained or improved  2/18/2025 0129 by Brooklyn Pace RN  Outcome: Progressing  Flowsheets (Taken 2/17/2025 2042)  Care Plan - Patient's Chronic Conditions and Co-Morbidity Symptoms are Monitored and Maintained or Improved: Monitor and assess patient's chronic conditions and comorbid symptoms for stability, deterioration, or improvement  2/17/2025 1757 by Pavithra lAlred LPN  Outcome: Progressing  Flowsheets (Taken 2/17/2025 1757)  Care Plan - Patient's Chronic Conditions and Co-Morbidity Symptoms are Monitored and Maintained or Improved:   Monitor and assess patient's chronic conditions and comorbid symptoms for stability, deterioration, or improvement   Collaborate with multidisciplinary team to address chronic and comorbid conditions and prevent exacerbation or deterioration     Problem: Pain  Goal: Verbalizes/displays adequate comfort level or baseline comfort level  2/18/2025 0129 by Brooklyn Pace RN  Outcome: Progressing  2/17/2025 1757 by Pavithra Allred LPN  Outcome: Progressing  Flowsheets (Taken 2/17/2025 1757)  Verbalizes/displays adequate comfort level or baseline comfort level:   Encourage patient to monitor pain and request assistance   Assess pain using appropriate pain scale   Administer analgesics based on type and severity of pain and evaluate response     Problem: Discharge Planning  Goal: Discharge to home or other facility with appropriate resources  2/18/2025 0129 by Brooklyn Pace RN  Outcome: Progressing  Flowsheets (Taken 2/17/2025 2042)  Discharge to home or other facility with appropriate resources: Identify barriers to discharge with patient and caregiver  2/17/2025 1757 by Pavithra Allred LPN  Outcome: Progressing  Flowsheets (Taken 2/17/2025 1757)  Discharge to home or other facility with appropriate resources:   Identify barriers to discharge 
  Problem: Chronic Conditions and Co-morbidities  Goal: Patient's chronic conditions and co-morbidity symptoms are monitored and maintained or improved  2/18/2025 1252 by Maria Herrera RN  Outcome: Completed     Problem: Discharge Planning  Goal: Discharge to home or other facility with appropriate resources  2/18/2025 1252 by Maria Herrera RN  Outcome: Completed     Problem: Pain  Goal: Verbalizes/displays adequate comfort level or baseline comfort level  2/18/2025 1252 by Maria Herrera RN  Outcome: Completed     Problem: Skin/Tissue Integrity  Goal: Skin integrity remains intact  Description: 1.  Monitor for areas of redness and/or skin breakdown  2.  Assess vascular access sites hourly  3.  Every 4-6 hours minimum:  Change oxygen saturation probe site  4.  Every 4-6 hours:  If on nasal continuous positive airway pressure, respiratory therapy assess nares and determine need for appliance change or resting period  2/18/2025 1252 by Maria Herrera RN  Outcome: Completed  Flowsheets (Taken 2/18/2025 1250)  Skin Integrity Remains Intact: Monitor for areas of redness and/or skin breakdown     Problem: Safety - Adult  Goal: Free from fall injury  2/18/2025 1252 by Maria Herrera RN  Outcome: Completed     Problem: Respiratory - Adult  Goal: Clear lung sounds  2/18/2025 0907 by Tyron Pearson RCP  Outcome: Progressing   Care plan reviewed with patient.  Patient verbalize understanding of the plan of care and contribute to goal setting.     
  Problem: Chronic Conditions and Co-morbidities  Goal: Patient's chronic conditions and co-morbidity symptoms are monitored and maintained or improved  Outcome: Progressing     Problem: Discharge Planning  Goal: Discharge to home or other facility with appropriate resources  2/15/2025 0507 by Kamilah Pinto RN  Outcome: Progressing  2/15/2025 0443 by Kamilah Pinto RN  Outcome: Progressing     Problem: Pain  Goal: Verbalizes/displays adequate comfort level or baseline comfort level  2/15/2025 0507 by Kamilah Pinto RN  Outcome: Progressing  2/15/2025 0443 by Kamilah Pinto RN  Outcome: Progressing     Problem: Skin/Tissue Integrity  Goal: Skin integrity remains intact  Description: 1.  Monitor for areas of redness and/or skin breakdown  2.  Assess vascular access sites hourly  3.  Every 4-6 hours minimum:  Change oxygen saturation probe site  4.  Every 4-6 hours:  If on nasal continuous positive airway pressure, respiratory therapy assess nares and determine need for appliance change or resting period  Outcome: Progressing     Problem: Respiratory - Adult  Goal: Clear lung sounds  2/14/2025 2031 by Justine Roberts RCP  Outcome: Progressing     
  Problem: Chronic Conditions and Co-morbidities  Goal: Patient's chronic conditions and co-morbidity symptoms are monitored and maintained or improved  Outcome: Progressing     Problem: Discharge Planning  Goal: Discharge to home or other facility with appropriate resources  Outcome: Progressing     Problem: Pain  Goal: Verbalizes/displays adequate comfort level or baseline comfort level  Outcome: Progressing     Problem: Respiratory - Adult  Goal: Clear lung sounds  2/14/2025 2031 by Justine Roberts, APRIL  Outcome: Progressing     
  Problem: Chronic Conditions and Co-morbidities  Goal: Patient's chronic conditions and co-morbidity symptoms are monitored and maintained or improved  Outcome: Progressing     Problem: Discharge Planning  Goal: Discharge to home or other facility with appropriate resources  Outcome: Progressing     Problem: Pain  Goal: Verbalizes/displays adequate comfort level or baseline comfort level  Outcome: Progressing     Problem: Skin/Tissue Integrity  Goal: Skin integrity remains intact  Description: 1.  Monitor for areas of redness and/or skin breakdown  2.  Assess vascular access sites hourly  3.  Every 4-6 hours minimum:  Change oxygen saturation probe site  4.  Every 4-6 hours:  If on nasal continuous positive airway pressure, respiratory therapy assess nares and determine need for appliance change or resting period  Outcome: Progressing     Problem: Safety - Adult  Goal: Free from fall injury  Outcome: Progressing     
  Problem: Chronic Conditions and Co-morbidities  Goal: Patient's chronic conditions and co-morbidity symptoms are monitored and maintained or improved  Outcome: Progressing     Problem: Discharge Planning  Goal: Discharge to home or other facility with appropriate resources  Outcome: Progressing     Problem: Pain  Goal: Verbalizes/displays adequate comfort level or baseline comfort level  Outcome: Progressing     Problem: Skin/Tissue Integrity  Goal: Skin integrity remains intact  Description: 1.  Monitor for areas of redness and/or skin breakdown  2.  Assess vascular access sites hourly  3.  Every 4-6 hours minimum:  Change oxygen saturation probe site  4.  Every 4-6 hours:  If on nasal continuous positive airway pressure, respiratory therapy assess nares and determine need for appliance change or resting period  Outcome: Progressing     Problem: Safety - Adult  Goal: Free from fall injury  Outcome: Progressing     
  Problem: Chronic Conditions and Co-morbidities  Goal: Patient's chronic conditions and co-morbidity symptoms are monitored and maintained or improved  Outcome: Progressing     Problem: Pain  Goal: Verbalizes/displays adequate comfort level or baseline comfort level  Outcome: Progressing     Problem: Skin/Tissue Integrity  Goal: Skin integrity remains intact  Description: 1.  Monitor for areas of redness and/or skin breakdown  2.  Assess vascular access sites hourly  3.  Every 4-6 hours minimum:  Change oxygen saturation probe site  4.  Every 4-6 hours:  If on nasal continuous positive airway pressure, respiratory therapy assess nares and determine need for appliance change or resting period  Outcome: Progressing     Problem: Safety - Adult  Goal: Free from fall injury  Outcome: Progressing     
  Problem: Chronic Conditions and Co-morbidities  Goal: Patient's chronic conditions and co-morbidity symptoms are monitored and maintained or improved  Outcome: Progressing  Flowsheets (Taken 2/17/2025 1757)  Care Plan - Patient's Chronic Conditions and Co-Morbidity Symptoms are Monitored and Maintained or Improved:   Monitor and assess patient's chronic conditions and comorbid symptoms for stability, deterioration, or improvement   Collaborate with multidisciplinary team to address chronic and comorbid conditions and prevent exacerbation or deterioration     Problem: Discharge Planning  Goal: Discharge to home or other facility with appropriate resources  Outcome: Progressing  Flowsheets (Taken 2/17/2025 1757)  Discharge to home or other facility with appropriate resources:   Identify barriers to discharge with patient and caregiver   Arrange for needed discharge resources and transportation as appropriate     Problem: Pain  Goal: Verbalizes/displays adequate comfort level or baseline comfort level  Outcome: Progressing  Flowsheets (Taken 2/17/2025 1757)  Verbalizes/displays adequate comfort level or baseline comfort level:   Encourage patient to monitor pain and request assistance   Assess pain using appropriate pain scale   Administer analgesics based on type and severity of pain and evaluate response     Problem: Skin/Tissue Integrity  Goal: Skin integrity remains intact  Description: 1.  Monitor for areas of redness and/or skin breakdown  2.  Assess vascular access sites hourly  3.  Every 4-6 hours minimum:  Change oxygen saturation probe site  4.  Every 4-6 hours:  If on nasal continuous positive airway pressure, respiratory therapy assess nares and determine need for appliance change or resting period  Outcome: Progressing  Flowsheets (Taken 2/17/2025 1757)  Skin Integrity Remains Intact: Monitor for areas of redness and/or skin breakdown     Problem: Safety - Adult  Goal: Free from fall injury  Outcome: 
  Problem: Chronic Conditions and Co-morbidities  Goal: Patient's chronic conditions and co-morbidity symptoms are monitored and maintained or improved  Outcome: Progressing  Flowsheets (Taken 2/9/2025 0800 by Jose Francisco Whitaker, RN)  Care Plan - Patient's Chronic Conditions and Co-Morbidity Symptoms are Monitored and Maintained or Improved:   Monitor and assess patient's chronic conditions and comorbid symptoms for stability, deterioration, or improvement   Collaborate with multidisciplinary team to address chronic and comorbid conditions and prevent exacerbation or deterioration   Update acute care plan with appropriate goals if chronic or comorbid symptoms are exacerbated and prevent overall improvement and discharge     Problem: Discharge Planning  Goal: Discharge to home or other facility with appropriate resources  Outcome: Progressing  Flowsheets (Taken 2/9/2025 0800 by Jose Francisco Whitaker, RN)  Discharge to home or other facility with appropriate resources:   Identify barriers to discharge with patient and caregiver   Arrange for needed discharge resources and transportation as appropriate   Identify discharge learning needs (meds, wound care, etc)     Problem: Pain  Goal: Verbalizes/displays adequate comfort level or baseline comfort level  Outcome: Progressing     Problem: Skin/Tissue Integrity  Goal: Skin integrity remains intact  Description: 1.  Monitor for areas of redness and/or skin breakdown  2.  Assess vascular access sites hourly  3.  Every 4-6 hours minimum:  Change oxygen saturation probe site  4.  Every 4-6 hours:  If on nasal continuous positive airway pressure, respiratory therapy assess nares and determine need for appliance change or resting period  Outcome: Progressing     Problem: Safety - Adult  Goal: Free from fall injury  Outcome: Progressing     
  Problem: Chronic Conditions and Co-morbidities  Goal: Patient's chronic conditions and co-morbidity symptoms are monitored and maintained or improved  Outcome: Progressing  Flowsheets (Taken 2/9/2025 0800 by Jose Francisco Whitaker, RN)  Care Plan - Patient's Chronic Conditions and Co-Morbidity Symptoms are Monitored and Maintained or Improved:   Monitor and assess patient's chronic conditions and comorbid symptoms for stability, deterioration, or improvement   Collaborate with multidisciplinary team to address chronic and comorbid conditions and prevent exacerbation or deterioration   Update acute care plan with appropriate goals if chronic or comorbid symptoms are exacerbated and prevent overall improvement and discharge     Problem: Discharge Planning  Goal: Discharge to home or other facility with appropriate resources  Outcome: Progressing  Flowsheets (Taken 2/9/2025 0800 by Jose Francisco Whitaker, RN)  Discharge to home or other facility with appropriate resources:   Identify barriers to discharge with patient and caregiver   Arrange for needed discharge resources and transportation as appropriate   Identify discharge learning needs (meds, wound care, etc)     Problem: Pain  Goal: Verbalizes/displays adequate comfort level or baseline comfort level  Outcome: Progressing  Flowsheets (Taken 2/11/2025 0400 by Johnny Fonseca, INDY)  Verbalizes/displays adequate comfort level or baseline comfort level: Encourage patient to monitor pain and request assistance     Problem: Skin/Tissue Integrity  Goal: Skin integrity remains intact  Description: 1.  Monitor for areas of redness and/or skin breakdown  2.  Assess vascular access sites hourly  3.  Every 4-6 hours minimum:  Change oxygen saturation probe site  4.  Every 4-6 hours:  If on nasal continuous positive airway pressure, respiratory therapy assess nares and determine need for appliance change or resting period  Outcome: Progressing  Flowsheets (Taken 2/14/2025 8933)  Skin 
  Problem: Respiratory - Adult  Goal: Clear lung sounds  2/15/2025 0957 by Radha Ortega, RCP  Outcome: Progressing   Pt continues on MDI for maintenance of COPD, to clear lung sounds/improve aeration and pt does aerosols at home. Patient mutually agreed on goals.    
  Problem: Respiratory - Adult  Goal: Clear lung sounds  2/15/2025 1616 by Arleth Braswell, RCP  Outcome: Progressing     
  Problem: Respiratory - Adult  Goal: Clear lung sounds  2/17/2025 2239 by Ruby Lind RCP  Outcome: Progressing     
  Problem: Respiratory - Adult  Goal: Clear lung sounds  2/18/2025 0907 by Tyron Pearson, RCP  Outcome: Progressing   Patient mutually agreed on goals.    
  Problem: Respiratory - Adult  Goal: Clear lung sounds  Outcome: Progressing     
  Problem: Respiratory - Adult  Goal: Clear lung sounds  Outcome: Progressing   Patient mutually agreed on goals.      
  Problem: Respiratory - Adult  Goal: Clear lung sounds  Outcome: Progressing   Still wheezing continue plan.  
Implement non-pharmacological measures as appropriate and evaluate response   Notify Licensed Independent Practitioner if interventions unsuccessful or patient reports new pain     Problem: Skin/Tissue Integrity  Goal: Skin integrity remains intact  Description: 1.  Monitor for areas of redness and/or skin breakdown  2.  Assess vascular access sites hourly  3.  Every 4-6 hours minimum:  Change oxygen saturation probe site  4.  Every 4-6 hours:  If on nasal continuous positive airway pressure, respiratory therapy assess nares and determine need for appliance change or resting period  2/16/2025 0427 by Kamilah Pinto, RN  Outcome: Progressing  2/15/2025 1745 by Haylee Patel, RN  Outcome: Progressing  Flowsheets (Taken 2/15/2025 1745)  Skin Integrity Remains Intact:   Monitor for areas of redness and/or skin breakdown   Assess vascular access sites hourly     Problem: Safety - Adult  Goal: Free from fall injury  2/15/2025 1745 by Haylee Patel, RN  Outcome: Progressing  Flowsheets (Taken 2/15/2025 1745)  Free From Fall Injury: Instruct family/caregiver on patient safety     Problem: Respiratory - Adult  Goal: Clear lung sounds  2/15/2025 1616 by Arleth Braswell RCP  Outcome: Progressing

## 2025-02-18 NOTE — DISCHARGE INSTR - DIET

## 2025-02-18 NOTE — CARE COORDINATION
Insurance denied precert for SNF. Patient can appeal by calling #[phone] or Fax #[phone]

## 2025-02-21 NOTE — DISCHARGE SUMMARY
regardless of in vitro sensitivity, should not be used for staphylococcal infections.        Lab Results   Component Value Date/Time    LABANAE  02/08/2025 02:45 AM     Culture overgrown by swarming Proteus species. No further evaluation of this culture is possible. If a true mixed aerobic and anaerobic infection is suspected, then broad spectrum empiric antibiotic therapy is indicated and should include coverage for anaerobic organisms.          Urinalysis:      Lab Results   Component Value Date/Time    NITRU NEGATIVE 02/08/2025 12:20 AM    WBCUA 10-15 02/08/2025 12:20 AM    WBCUA 0-2 04/19/2012 10:10 AM    BACTERIA NONE SEEN 02/08/2025 12:20 AM    RBCUA 3-5 02/08/2025 12:20 AM    BLOODU TRACE 02/08/2025 12:20 AM    GLUCOSEU NEGATIVE 02/08/2025 12:20 AM       Radiology:-  CT ABDOMEN PELVIS W IV CONTRAST Additional Contrast? None    Result Date: 2/8/2025  Exam: CT Abdomen and Pelvis with contrast Comparison: 02/06/2025 Clinical history: Left flank pain, redness around the nephrostomy tube Findings: Recent left nephrectomy. Pigtail catheter drain in the left perinephric region/left flank is unchanged in position when compared to prior exam. Left perinephric fluid collection appears nearly completely resolved and is unchanged in appearance compared to 02/06/2025. Soft tissue edema within the subcutaneous fat of the left flank around the drain is more prominent on todays exam than compared to prior exam likely representing a worsening cellulitis. No soft tissue abscess. Enlarged fatty infiltrated liver, unchanged. Small well-defined hypodense lesion in the left lobe of the liver may represent cyst or hemangioma. The spleen and pancreas do not demonstrate any acute process. Prior cholecystectomy No choledocholithiasis or biliary obstruction. Right kidney has a normal appearance. No stones or hydronephrosis. No abdominal aortic aneurysm. No small bowel obstruction Appendix is not identified No colitis or diverticulitis.

## 2025-02-25 ENCOUNTER — OFFICE VISIT (OUTPATIENT)
Dept: UROLOGY | Age: 70
End: 2025-02-25
Payer: MEDICARE

## 2025-02-25 VITALS — RESPIRATION RATE: 22 BRPM | BODY MASS INDEX: 29.19 KG/M2 | HEIGHT: 64 IN | WEIGHT: 171 LBS

## 2025-02-25 DIAGNOSIS — N20.0 NEPHROLITHIASIS: Primary | ICD-10-CM

## 2025-02-25 DIAGNOSIS — N20.0 NEPHROLITHIASIS: ICD-10-CM

## 2025-02-25 DIAGNOSIS — N39.41 URGE INCONTINENCE OF URINE: ICD-10-CM

## 2025-02-25 DIAGNOSIS — N39.3 STRESS INCONTINENCE IN FEMALE: ICD-10-CM

## 2025-02-25 DIAGNOSIS — N39.0 RECURRENT UTI: ICD-10-CM

## 2025-02-25 DIAGNOSIS — R82.90 ABNORMAL URINALYSIS: ICD-10-CM

## 2025-02-25 DIAGNOSIS — S37.019A PERINEPHRIC HEMATOMA: Primary | ICD-10-CM

## 2025-02-25 DIAGNOSIS — R18.8 RETROPERITONEAL FLUID COLLECTION: ICD-10-CM

## 2025-02-25 DIAGNOSIS — N32.81 OAB (OVERACTIVE BLADDER): ICD-10-CM

## 2025-02-25 DIAGNOSIS — R35.0 URINARY FREQUENCY: ICD-10-CM

## 2025-02-25 PROCEDURE — G8417 CALC BMI ABV UP PARAM F/U: HCPCS | Performed by: NURSE PRACTITIONER

## 2025-02-25 PROCEDURE — G8400 PT W/DXA NO RESULTS DOC: HCPCS | Performed by: NURSE PRACTITIONER

## 2025-02-25 PROCEDURE — 4004F PT TOBACCO SCREEN RCVD TLK: CPT | Performed by: NURSE PRACTITIONER

## 2025-02-25 PROCEDURE — 1111F DSCHRG MED/CURRENT MED MERGE: CPT | Performed by: NURSE PRACTITIONER

## 2025-02-25 PROCEDURE — 0509F URINE INCON PLAN DOCD: CPT | Performed by: NURSE PRACTITIONER

## 2025-02-25 PROCEDURE — 1159F MED LIST DOCD IN RCRD: CPT | Performed by: NURSE PRACTITIONER

## 2025-02-25 PROCEDURE — 3017F COLORECTAL CA SCREEN DOC REV: CPT | Performed by: NURSE PRACTITIONER

## 2025-02-25 PROCEDURE — 99214 OFFICE O/P EST MOD 30 MIN: CPT | Performed by: NURSE PRACTITIONER

## 2025-02-25 PROCEDURE — G8427 DOCREV CUR MEDS BY ELIG CLIN: HCPCS | Performed by: NURSE PRACTITIONER

## 2025-02-25 PROCEDURE — 1090F PRES/ABSN URINE INCON ASSESS: CPT | Performed by: NURSE PRACTITIONER

## 2025-02-25 PROCEDURE — 1123F ACP DISCUSS/DSCN MKR DOCD: CPT | Performed by: NURSE PRACTITIONER

## 2025-02-25 NOTE — PATIENT INSTRUCTIONS
Recommend D-mannose and cranberry     Recommend calling infectious disease     Call sooner with any questions or concerns.

## 2025-02-25 NOTE — PROGRESS NOTES
[facility] [place] [facility]  [facility]  [address]  [address]  [facility] [address]  Dept: [phone]  Loc: [phone]    Visit Date: 2/25/2025        HPI:     Elli Coulter is a 69 y.o. female who presents today for:  Chief Complaint   Patient presents with    Follow-up       HPI  Patient presents to urology clinic for hospital follow-up.     Elli was seen in the hospital from 2/7/2025-2/18/2025 for left flank pain associated with left retroperitoneal fluid collection. No evidence of abscess, nephrostomy drain removed (2/9). Urology recommend transfer to tertiary care facility however patient refused.  She underwent a left nephrectomy 10/2024 by [facility]   She is doing well today. Denies suprapubic pressure, gross hematuria, dysuria, fever or chills.       Also with hx of kidney stones. Underwent cystoscopy left ureteroscopy, left holmium laser lithotripsy and stent exchange 1/12/23 with Dr. Lanier.      Hx of OAB and incontinence. She did have an interstim which was ultimately removed. She is s/p cystoscopy with bladder botox 200 units by Dr. Lanier on 8/22/2023.     Current Outpatient Medications   Medication Sig Dispense Refill    insulin glargine (LANTUS SOLOSTAR) 100 UNIT/ML injection pen Inject 10 Units into the skin nightly      amLODIPine (NORVASC) 10 MG tablet Take 1 tablet by mouth daily      cefadroxil (DURICEF) 500 MG capsule Take 1 capsule by mouth 2 times daily for 10 days 20 capsule 0    [START ON 3/3/2025] ferrous sulfate (IRON 325) 325 (65 Fe) MG tablet Take 1 tablet by mouth every 3 days With breakfast      guaiFENesin-dextromethorphan (ROBITUSSIN DM) 100-10 MG/5ML syrup Take 5 mLs by mouth every 4 hours as needed for Cough      docusate sodium (COLACE) 100 MG capsule Take 1 capsule by mouth 2 times daily      fluticasone (FLONASE) 50 MCG/ACT nasal spray 1 spray by Each Nostril route 2 times daily as needed for Rhinitis

## 2025-02-26 ENCOUNTER — TELEPHONE (OUTPATIENT)
Dept: UROLOGY | Age: 70
End: 2025-02-26

## 2025-02-26 NOTE — TELEPHONE ENCOUNTER
Dianna Freeman need orders faxed to [phone] with the preferred dose of cranberry and D Mannose

## 2025-02-27 LAB
BACTERIA UR CULT: ABNORMAL
ORGANISM: ABNORMAL

## 2025-03-05 ENCOUNTER — OFFICE VISIT (OUTPATIENT)
Dept: PULMONOLOGY | Age: 70
End: 2025-03-05
Payer: MEDICARE

## 2025-03-05 VITALS
TEMPERATURE: 98.1 F | BODY MASS INDEX: 29.67 KG/M2 | OXYGEN SATURATION: 96 % | DIASTOLIC BLOOD PRESSURE: 78 MMHG | HEIGHT: 64 IN | WEIGHT: 173.8 LBS | SYSTOLIC BLOOD PRESSURE: 140 MMHG | HEART RATE: 60 BPM

## 2025-03-05 DIAGNOSIS — Z87.891 PERSONAL HISTORY OF TOBACCO USE: ICD-10-CM

## 2025-03-05 DIAGNOSIS — J44.1 COPD EXACERBATION (HCC): Primary | ICD-10-CM

## 2025-03-05 DIAGNOSIS — J44.9 CHRONIC OBSTRUCTIVE PULMONARY DISEASE, UNSPECIFIED COPD TYPE (HCC): ICD-10-CM

## 2025-03-05 PROCEDURE — G8400 PT W/DXA NO RESULTS DOC: HCPCS | Performed by: INTERNAL MEDICINE

## 2025-03-05 PROCEDURE — 1159F MED LIST DOCD IN RCRD: CPT | Performed by: INTERNAL MEDICINE

## 2025-03-05 PROCEDURE — 1036F TOBACCO NON-USER: CPT | Performed by: INTERNAL MEDICINE

## 2025-03-05 PROCEDURE — 99214 OFFICE O/P EST MOD 30 MIN: CPT | Performed by: INTERNAL MEDICINE

## 2025-03-05 PROCEDURE — G8427 DOCREV CUR MEDS BY ELIG CLIN: HCPCS | Performed by: INTERNAL MEDICINE

## 2025-03-05 PROCEDURE — 1123F ACP DISCUSS/DSCN MKR DOCD: CPT | Performed by: INTERNAL MEDICINE

## 2025-03-05 PROCEDURE — 3023F SPIROM DOC REV: CPT | Performed by: INTERNAL MEDICINE

## 2025-03-05 PROCEDURE — G0296 VISIT TO DETERM LDCT ELIG: HCPCS | Performed by: INTERNAL MEDICINE

## 2025-03-05 PROCEDURE — 3017F COLORECTAL CA SCREEN DOC REV: CPT | Performed by: INTERNAL MEDICINE

## 2025-03-05 PROCEDURE — G8417 CALC BMI ABV UP PARAM F/U: HCPCS | Performed by: INTERNAL MEDICINE

## 2025-03-05 PROCEDURE — 1111F DSCHRG MED/CURRENT MED MERGE: CPT | Performed by: INTERNAL MEDICINE

## 2025-03-05 PROCEDURE — 3078F DIAST BP <80 MM HG: CPT | Performed by: INTERNAL MEDICINE

## 2025-03-05 PROCEDURE — 3077F SYST BP >= 140 MM HG: CPT | Performed by: INTERNAL MEDICINE

## 2025-03-05 PROCEDURE — 1090F PRES/ABSN URINE INCON ASSESS: CPT | Performed by: INTERNAL MEDICINE

## 2025-03-05 ASSESSMENT — ENCOUNTER SYMPTOMS
VOICE CHANGE: 0
WHEEZING: 1
SHORTNESS OF BREATH: 1
CHEST TIGHTNESS: 1
COUGH: 1

## 2025-03-05 NOTE — PROGRESS NOTES
Subjective:      Patient ID: Elli Coulter is a 69 y.o. female.    CC: COPD    HPI       Comes for a scheduled follow-up for COPD  Today we need to schedule had a screening CT scan of the chest for early diagnosis of lung cancer which she is doing in the next couple of months.  Since I saw her last she had a left nephrectomy due to persistent perinephric abscess, currently the nephrostomy tube is out, she required hospital admission for IV antibiotics and surgery.    She had flareup of COPD, tested positive for COVID, treated with Paxlovid however her lower respiratory tract symptoms have since flared up to include chest tightness, cough sometimes productive of thick yellow sputum, no fever, wheezing.    Current medications for COPD includes only inhaled albuterol, currently at the nursing home      Review of Systems   Constitutional:  Negative for fatigue.   HENT:  Negative for voice change.    Respiratory:  Positive for cough, chest tightness, shortness of breath and wheezing.    Endocrine: Negative.    Musculoskeletal:  Negative for arthralgias.   Psychiatric/Behavioral:  The patient is not nervous/anxious.           All other systems reviewed and are negative    Lung Nodule Screening     [x] Qualifies    [] Does not qualify   [] Declined   [] Completed  Past Medical History:   Diagnosis Date    Acute cystitis with hematuria 01/19/2024    Acute encephalopathy 01/10/2024    Alcohol dependence in remission (HCC)     Allergic rhinitis     Arthritis     back, arms, hips    Bacteriuria 10/13/2024    Bipolar 1 disorder (HCC)     Cancer ([facility]) 2011    cancerous polyps removed - Dr. Silva    Cannabis abuse     Cataract     Change of, skin texture 04/21/2014    Cocaine abuse (HCC)     Colon polyps     COPD (chronic obstructive pulmonary disease) ([facility]) 07/24/2014    Coronary vasospasm 08/17/2019    Depression     Disorder associated with Helicobacter species 04/27/2012    Diverticulitis of colon     Endometriosis

## 2025-03-24 ENCOUNTER — OFFICE VISIT (OUTPATIENT)
Dept: NEPHROLOGY | Age: 70
End: 2025-03-24
Payer: MEDICARE

## 2025-03-24 VITALS
HEART RATE: 57 BPM | HEIGHT: 64 IN | SYSTOLIC BLOOD PRESSURE: 118 MMHG | DIASTOLIC BLOOD PRESSURE: 62 MMHG | BODY MASS INDEX: 30.05 KG/M2 | WEIGHT: 176 LBS | OXYGEN SATURATION: 96 %

## 2025-03-24 DIAGNOSIS — E11.21 DIABETIC NEPHROPATHY ASSOCIATED WITH TYPE 2 DIABETES MELLITUS: ICD-10-CM

## 2025-03-24 DIAGNOSIS — L03.90 CELLULITIS, UNSPECIFIED CELLULITIS SITE: ICD-10-CM

## 2025-03-24 DIAGNOSIS — E87.20 METABOLIC ACIDOSIS: ICD-10-CM

## 2025-03-24 DIAGNOSIS — N18.31 STAGE 3A CHRONIC KIDNEY DISEASE (HCC): Primary | ICD-10-CM

## 2025-03-24 DIAGNOSIS — I10 PRIMARY HYPERTENSION: ICD-10-CM

## 2025-03-24 DIAGNOSIS — N20.0 NEPHROLITHIASIS: ICD-10-CM

## 2025-03-24 DIAGNOSIS — E87.70 HYPERVOLEMIA, UNSPECIFIED HYPERVOLEMIA TYPE: ICD-10-CM

## 2025-03-24 DIAGNOSIS — Z90.5 SOLITARY KIDNEY, ACQUIRED: ICD-10-CM

## 2025-03-24 DIAGNOSIS — N13.30 HYDRONEPHROSIS OF LEFT KIDNEY: ICD-10-CM

## 2025-03-24 DIAGNOSIS — Z90.5 HISTORY OF LEFT NEPHRECTOMY: ICD-10-CM

## 2025-03-24 PROBLEM — N18.2 CKD (CHRONIC KIDNEY DISEASE), STAGE II: Status: ACTIVE | Noted: 2025-03-24

## 2025-03-24 PROCEDURE — 3017F COLORECTAL CA SCREEN DOC REV: CPT | Performed by: INTERNAL MEDICINE

## 2025-03-24 PROCEDURE — G8417 CALC BMI ABV UP PARAM F/U: HCPCS | Performed by: INTERNAL MEDICINE

## 2025-03-24 PROCEDURE — 1159F MED LIST DOCD IN RCRD: CPT | Performed by: INTERNAL MEDICINE

## 2025-03-24 PROCEDURE — G8427 DOCREV CUR MEDS BY ELIG CLIN: HCPCS | Performed by: INTERNAL MEDICINE

## 2025-03-24 PROCEDURE — 3074F SYST BP LT 130 MM HG: CPT | Performed by: INTERNAL MEDICINE

## 2025-03-24 PROCEDURE — 99204 OFFICE O/P NEW MOD 45 MIN: CPT | Performed by: INTERNAL MEDICINE

## 2025-03-24 PROCEDURE — 1090F PRES/ABSN URINE INCON ASSESS: CPT | Performed by: INTERNAL MEDICINE

## 2025-03-24 PROCEDURE — 1123F ACP DISCUSS/DSCN MKR DOCD: CPT | Performed by: INTERNAL MEDICINE

## 2025-03-24 PROCEDURE — 3078F DIAST BP <80 MM HG: CPT | Performed by: INTERNAL MEDICINE

## 2025-03-24 PROCEDURE — 1036F TOBACCO NON-USER: CPT | Performed by: INTERNAL MEDICINE

## 2025-03-24 PROCEDURE — 2022F DILAT RTA XM EVC RTNOPTHY: CPT | Performed by: INTERNAL MEDICINE

## 2025-03-24 PROCEDURE — 3046F HEMOGLOBIN A1C LEVEL >9.0%: CPT | Performed by: INTERNAL MEDICINE

## 2025-03-24 PROCEDURE — G8400 PT W/DXA NO RESULTS DOC: HCPCS | Performed by: INTERNAL MEDICINE

## 2025-03-24 RX ORDER — CEFADROXIL 500 MG/1
CAPSULE ORAL
COMMUNITY
Start: 2025-03-18

## 2025-03-24 RX ORDER — LINACLOTIDE 72 UG/1
CAPSULE, GELATIN COATED ORAL
COMMUNITY
Start: 2025-01-24

## 2025-03-24 RX ORDER — OXYCODONE AND ACETAMINOPHEN 5; 325 MG/1; MG/1
TABLET ORAL
COMMUNITY
Start: 2025-03-22

## 2025-03-24 RX ORDER — NIRMATRELVIR AND RITONAVIR 150-100 MG
KIT ORAL
COMMUNITY
Start: 2025-01-28

## 2025-03-24 NOTE — PROGRESS NOTES
[facility] nephrology Consult Note  Patient's Name: Elli Coulter  10:30 AM  3/24/2025    Nephrologist: Dileep    Reason for Consult: Kidney disease  Requesting Physician:  No att. providers found  PCP: Ryan Montalvo MD    Chief Complaint:  None  Assessment    ICD-10-CM    1. Stage 3a chronic kidney disease (HCC)  N18.31 Basic Metabolic Panel     Magnesium      2. Metabolic acidosis  E87.20       3. Hydronephrosis of left kidney  N13.30       4. Primary hypertension  I10       5. Cellulitis, unspecified cellulitis site  L03.90       6. Diabetic nephropathy associated with type 2 diabetes mellitus (HCC)  E11.21       7. Nephrolithiasis  N20.0 LITHOLINK     CANCELED: LITHOLINK      8. Solitary kidney, acquired  Z90.5       9. History of left nephrectomy  Z90.5       10. Hypervolemia, unspecified hypervolemia type  E87.70             Plan    Chronic kidney disease likely due to combination of diabetic nephropathy and hypertensive nephrosclerosis.  I discussed with the patient.  She understood well.  I addressed her questions.  Avoid any nonsteroidal anti-inflammatory medications.  Compressive list provided to her.  Will get a kidney ultrasound.  Metabolic acidosis is mild and we will monitor for now.  Renal ultrasound to follow-up for previous hydronephrosis.  Nephrostomy tube is managed by [facility].  Cellulitis appears resolved.  Diabetes mellitus is managed by another provider.   Litholink studies for nephrolithiasis.  Solitary kidney stable  History of left nephrectomy is noted  For volume overload, is advised to weigh herself every day.  Take extra dose of  diuretic if she gains 2 pounds or more within a day.  Return visit in 4 weeks with labs      History Obtained From: Patient and her caretaker.  History of Present Ilness:    Elli Coulter is a 69 y.o. female with history of hypertension, diabetes mellitus, history of SARS-CoV-2 infection, congestive heart failure, cellulitis of the left flank among 
New patient referred by Diamond Bravo for history of kidney stones and abnormal urinalysis. Patient says that she had her Left kidney removed 11/2024 at [facility] due to an abscess that was drained several times and wasn't resolving. She developed cellulitis around the drainage tubes. She was hospitalized here at [facility] for the cellulitis and for covid for approximately 2 weeks this year. Her niece is with her today. 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 ([facility]- possibly long term- may move to assisted living)  No kidney stones for at least 2 years. Reports recurrent UTI'S. Mercedes has been on a lot of antibiotics over the past few months.   She reports tingling/numbness in legs but denies swelling.  
yes

## 2025-03-31 ENCOUNTER — APPOINTMENT (OUTPATIENT)
Dept: CT IMAGING | Age: 70
DRG: 863 | End: 2025-03-31
Payer: MEDICARE

## 2025-03-31 ENCOUNTER — HOSPITAL ENCOUNTER (INPATIENT)
Age: 70
LOS: 5 days | Discharge: NURSING, REHAB OR LONG-TERM CARE | DRG: 863 | End: 2025-04-08
Attending: EMERGENCY MEDICINE | Admitting: NURSE PRACTITIONER
Payer: MEDICARE

## 2025-03-31 ENCOUNTER — APPOINTMENT (OUTPATIENT)
Dept: GENERAL RADIOLOGY | Age: 70
DRG: 863 | End: 2025-03-31
Payer: MEDICARE

## 2025-03-31 DIAGNOSIS — M54.50 CHRONIC BILATERAL LOW BACK PAIN WITHOUT SCIATICA: ICD-10-CM

## 2025-03-31 DIAGNOSIS — G89.29 CHRONIC BILATERAL LOW BACK PAIN WITHOUT SCIATICA: ICD-10-CM

## 2025-03-31 DIAGNOSIS — R18.8 RETROPERITONEAL FLUID COLLECTION: ICD-10-CM

## 2025-03-31 DIAGNOSIS — M54.42 ACUTE LEFT-SIDED LOW BACK PAIN WITH LEFT-SIDED SCIATICA: ICD-10-CM

## 2025-03-31 DIAGNOSIS — R51.9 ACUTE NONINTRACTABLE HEADACHE, UNSPECIFIED HEADACHE TYPE: Primary | ICD-10-CM

## 2025-03-31 LAB
ALBUMIN SERPL BCG-MCNC: 4.1 G/DL (ref 3.4–4.9)
ALP SERPL-CCNC: 62 U/L (ref 35–104)
ALT SERPL W/O P-5'-P-CCNC: 32 U/L (ref 10–35)
ANION GAP SERPL CALC-SCNC: 10 MEQ/L (ref 8–16)
AST SERPL-CCNC: 25 U/L (ref 10–35)
BASOPHILS ABSOLUTE: 0 THOU/MM3 (ref 0–0.1)
BASOPHILS NFR BLD AUTO: 0.5 %
BILIRUB CONJ SERPL-MCNC: 0.2 MG/DL (ref 0–0.2)
BILIRUB SERPL-MCNC: 0.3 MG/DL (ref 0.3–1.2)
BILIRUB UR QL STRIP.AUTO: NEGATIVE
BUN SERPL-MCNC: 17 MG/DL (ref 8–23)
CALCIUM SERPL-MCNC: 10.2 MG/DL (ref 8.8–10.2)
CHARACTER UR: CLEAR
CHLORIDE SERPL-SCNC: 98 MEQ/L (ref 98–111)
CO2 SERPL-SCNC: 24 MEQ/L (ref 22–29)
COLOR, UA: YELLOW
CREAT SERPL-MCNC: 0.9 MG/DL (ref 0.5–0.9)
CRP SERPL-MCNC: 6.41 MG/DL (ref 0–0.5)
DEPRECATED RDW RBC AUTO: 46.2 FL (ref 35–45)
EKG ATRIAL RATE: 72 BPM
EKG P AXIS: 52 DEGREES
EKG P-R INTERVAL: 168 MS
EKG Q-T INTERVAL: 402 MS
EKG QRS DURATION: 96 MS
EKG QTC CALCULATION (BAZETT): 440 MS
EKG R AXIS: 4 DEGREES
EKG T AXIS: 79 DEGREES
EKG VENTRICULAR RATE: 72 BPM
EOSINOPHIL NFR BLD AUTO: 1.4 %
EOSINOPHILS ABSOLUTE: 0.1 THOU/MM3 (ref 0–0.4)
ERYTHROCYTE [DISTWIDTH] IN BLOOD BY AUTOMATED COUNT: 15.4 % (ref 11.5–14.5)
FLUAV RNA RESP QL NAA+PROBE: NOT DETECTED
FLUBV RNA RESP QL NAA+PROBE: NOT DETECTED
GFR SERPL CREATININE-BSD FRML MDRD: 70 ML/MIN/1.73M2
GLUCOSE SERPL-MCNC: 109 MG/DL (ref 74–109)
GLUCOSE UR QL STRIP.AUTO: 250 MG/DL
HCT VFR BLD AUTO: 38.4 % (ref 37–47)
HGB BLD-MCNC: 12.4 GM/DL (ref 12–16)
HGB UR QL STRIP.AUTO: NEGATIVE
IMM GRANULOCYTES # BLD AUTO: 0.04 THOU/MM3 (ref 0–0.07)
IMM GRANULOCYTES NFR BLD AUTO: 0.5 %
KETONES UR QL STRIP.AUTO: NEGATIVE
LACTIC ACID, SEPSIS: 1 MMOL/L (ref 0.5–1.9)
LIPASE SERPL-CCNC: 39 U/L (ref 13–60)
LYMPHOCYTES ABSOLUTE: 2.5 THOU/MM3 (ref 1–4.8)
LYMPHOCYTES NFR BLD AUTO: 30.9 %
MAGNESIUM SERPL-MCNC: 1.9 MG/DL (ref 1.6–2.6)
MCH RBC QN AUTO: 26.4 PG (ref 26–33)
MCHC RBC AUTO-ENTMCNC: 32.3 GM/DL (ref 32.2–35.5)
MCV RBC AUTO: 81.9 FL (ref 81–99)
MONOCYTES ABSOLUTE: 0.6 THOU/MM3 (ref 0.4–1.3)
MONOCYTES NFR BLD AUTO: 7.1 %
NEUTROPHILS ABSOLUTE: 4.8 THOU/MM3 (ref 1.8–7.7)
NEUTROPHILS NFR BLD AUTO: 59.6 %
NITRITE UR QL STRIP: NEGATIVE
NRBC BLD AUTO-RTO: 0 /100 WBC
OSMOLALITY SERPL CALC.SUM OF ELEC: 266.6 MOSMOL/KG (ref 275–300)
PH UR STRIP.AUTO: 6.5 [PH] (ref 5–9)
PLATELET # BLD AUTO: 263 THOU/MM3 (ref 130–400)
PMV BLD AUTO: 9.1 FL (ref 9.4–12.4)
POTASSIUM SERPL-SCNC: 4.2 MEQ/L (ref 3.5–5.2)
PROT SERPL-MCNC: 7.5 G/DL (ref 6.4–8.3)
PROT UR STRIP.AUTO-MCNC: NEGATIVE MG/DL
RBC # BLD AUTO: 4.69 MILL/MM3 (ref 4.2–5.4)
SARS-COV-2 RNA RESP QL NAA+PROBE: NOT DETECTED
SODIUM SERPL-SCNC: 132 MEQ/L (ref 135–145)
SP GR UR REFRACT.AUTO: 1.01 (ref 1–1.03)
TROPONIN, HIGH SENSITIVITY: 18 NG/L (ref 0–12)
TROPONIN, HIGH SENSITIVITY: 19 NG/L (ref 0–12)
UROBILINOGEN, URINE: 0.2 EU/DL (ref 0–1)
WBC # BLD AUTO: 8.1 THOU/MM3 (ref 4.8–10.8)
WBC #/AREA URNS HPF: NEGATIVE /[HPF]

## 2025-03-31 PROCEDURE — 82248 BILIRUBIN DIRECT: CPT

## 2025-03-31 PROCEDURE — 93010 ELECTROCARDIOGRAM REPORT: CPT | Performed by: INTERNAL MEDICINE

## 2025-03-31 PROCEDURE — 70491 CT SOFT TISSUE NECK W/DYE: CPT

## 2025-03-31 PROCEDURE — 71045 X-RAY EXAM CHEST 1 VIEW: CPT

## 2025-03-31 PROCEDURE — 93005 ELECTROCARDIOGRAM TRACING: CPT | Performed by: EMERGENCY MEDICINE

## 2025-03-31 PROCEDURE — 99285 EMERGENCY DEPT VISIT HI MDM: CPT

## 2025-03-31 PROCEDURE — 83605 ASSAY OF LACTIC ACID: CPT

## 2025-03-31 PROCEDURE — 96374 THER/PROPH/DIAG INJ IV PUSH: CPT

## 2025-03-31 PROCEDURE — 80053 COMPREHEN METABOLIC PANEL: CPT

## 2025-03-31 PROCEDURE — 74177 CT ABD & PELVIS W/CONTRAST: CPT

## 2025-03-31 PROCEDURE — 83735 ASSAY OF MAGNESIUM: CPT

## 2025-03-31 PROCEDURE — 86140 C-REACTIVE PROTEIN: CPT

## 2025-03-31 PROCEDURE — 36415 COLL VENOUS BLD VENIPUNCTURE: CPT

## 2025-03-31 PROCEDURE — 84484 ASSAY OF TROPONIN QUANT: CPT

## 2025-03-31 PROCEDURE — 81003 URINALYSIS AUTO W/O SCOPE: CPT

## 2025-03-31 PROCEDURE — 6360000002 HC RX W HCPCS: Performed by: EMERGENCY MEDICINE

## 2025-03-31 PROCEDURE — 96375 TX/PRO/DX INJ NEW DRUG ADDON: CPT

## 2025-03-31 PROCEDURE — 6360000004 HC RX CONTRAST MEDICATION: Performed by: EMERGENCY MEDICINE

## 2025-03-31 PROCEDURE — 87636 SARSCOV2 & INF A&B AMP PRB: CPT

## 2025-03-31 PROCEDURE — 85025 COMPLETE CBC W/AUTO DIFF WBC: CPT

## 2025-03-31 PROCEDURE — 83690 ASSAY OF LIPASE: CPT

## 2025-03-31 PROCEDURE — 84145 PROCALCITONIN (PCT): CPT

## 2025-03-31 RX ORDER — IOPAMIDOL 755 MG/ML
80 INJECTION, SOLUTION INTRAVASCULAR
Status: COMPLETED | OUTPATIENT
Start: 2025-03-31 | End: 2025-03-31

## 2025-03-31 RX ORDER — DROPERIDOL 2.5 MG/ML
1.25 INJECTION, SOLUTION INTRAMUSCULAR; INTRAVENOUS ONCE
Status: COMPLETED | OUTPATIENT
Start: 2025-03-31 | End: 2025-03-31

## 2025-03-31 RX ORDER — ORPHENADRINE CITRATE 30 MG/ML
60 INJECTION INTRAMUSCULAR; INTRAVENOUS ONCE
Status: COMPLETED | OUTPATIENT
Start: 2025-03-31 | End: 2025-03-31

## 2025-03-31 RX ADMIN — ORPHENADRINE CITRATE 60 MG: 60 INJECTION INTRAMUSCULAR; INTRAVENOUS at 21:29

## 2025-03-31 RX ADMIN — IOPAMIDOL 80 ML: 755 INJECTION, SOLUTION INTRAVENOUS at 22:16

## 2025-03-31 RX ADMIN — DROPERIDOL 1.25 MG: 2.5 INJECTION, SOLUTION INTRAMUSCULAR; INTRAVENOUS at 21:29

## 2025-03-31 ASSESSMENT — PAIN - FUNCTIONAL ASSESSMENT: PAIN_FUNCTIONAL_ASSESSMENT: 0-10

## 2025-03-31 ASSESSMENT — PAIN SCALES - GENERAL: PAINLEVEL_OUTOF10: 8

## 2025-03-31 NOTE — ED TRIAGE NOTES
Pt present to the ED from [facility] with c/c of headache. Pt state she has a history of headaches. Pt denies taking medications for her headaches, but stated that she was receiving percocet for her chronic back pain that she recently stopped taking. Pt reports 8/10 headache that that encompasses her whole head. Pt denies any trauma to her head. Pt denies being around anyone sick but states she has had a cough for month. Covid/flu collected.

## 2025-04-01 ENCOUNTER — TELEPHONE (OUTPATIENT)
Dept: UROLOGY | Age: 70
End: 2025-04-01

## 2025-04-01 PROBLEM — M54.42 ACUTE LEFT-SIDED LOW BACK PAIN WITH LEFT-SIDED SCIATICA: Status: ACTIVE | Noted: 2025-04-01

## 2025-04-01 PROBLEM — Z90.5 HISTORY OF LEFT NEPHRECTOMY: Status: ACTIVE | Noted: 2025-04-01

## 2025-04-01 LAB
GLUCOSE BLD STRIP.AUTO-MCNC: 124 MG/DL (ref 70–108)
GLUCOSE BLD STRIP.AUTO-MCNC: 138 MG/DL (ref 70–108)
GLUCOSE BLD STRIP.AUTO-MCNC: 146 MG/DL (ref 70–108)
GLUCOSE BLD STRIP.AUTO-MCNC: 185 MG/DL (ref 70–108)
PROCALCITONIN SERPL IA-MCNC: 0.08 NG/ML (ref 0.01–0.09)

## 2025-04-01 PROCEDURE — 82948 REAGENT STRIP/BLOOD GLUCOSE: CPT

## 2025-04-01 PROCEDURE — 6360000002 HC RX W HCPCS

## 2025-04-01 PROCEDURE — 2580000003 HC RX 258

## 2025-04-01 PROCEDURE — 99221 1ST HOSP IP/OBS SF/LOW 40: CPT

## 2025-04-01 PROCEDURE — G0378 HOSPITAL OBSERVATION PER HR: HCPCS

## 2025-04-01 PROCEDURE — 99223 1ST HOSP IP/OBS HIGH 75: CPT

## 2025-04-01 PROCEDURE — 87040 BLOOD CULTURE FOR BACTERIA: CPT

## 2025-04-01 PROCEDURE — 6370000000 HC RX 637 (ALT 250 FOR IP): Performed by: INTERNAL MEDICINE

## 2025-04-01 PROCEDURE — 96368 THER/DIAG CONCURRENT INF: CPT

## 2025-04-01 PROCEDURE — 6370000000 HC RX 637 (ALT 250 FOR IP)

## 2025-04-01 PROCEDURE — 94640 AIRWAY INHALATION TREATMENT: CPT

## 2025-04-01 PROCEDURE — 96375 TX/PRO/DX INJ NEW DRUG ADDON: CPT

## 2025-04-01 PROCEDURE — 96372 THER/PROPH/DIAG INJ SC/IM: CPT

## 2025-04-01 PROCEDURE — 96365 THER/PROPH/DIAG IV INF INIT: CPT

## 2025-04-01 PROCEDURE — 2500000003 HC RX 250 WO HCPCS

## 2025-04-01 PROCEDURE — 96366 THER/PROPH/DIAG IV INF ADDON: CPT

## 2025-04-01 RX ORDER — SODIUM CHLORIDE 0.9 % (FLUSH) 0.9 %
5-40 SYRINGE (ML) INJECTION PRN
Status: DISCONTINUED | OUTPATIENT
Start: 2025-04-01 | End: 2025-04-08 | Stop reason: HOSPADM

## 2025-04-01 RX ORDER — AMLODIPINE BESYLATE 10 MG/1
10 TABLET ORAL DAILY
Status: DISCONTINUED | OUTPATIENT
Start: 2025-04-01 | End: 2025-04-08 | Stop reason: HOSPADM

## 2025-04-01 RX ORDER — DOCUSATE SODIUM 100 MG/1
100 CAPSULE, LIQUID FILLED ORAL 2 TIMES DAILY
Status: DISCONTINUED | OUTPATIENT
Start: 2025-04-01 | End: 2025-04-08 | Stop reason: HOSPADM

## 2025-04-01 RX ORDER — POTASSIUM CHLORIDE 1500 MG/1
40 TABLET, EXTENDED RELEASE ORAL PRN
Status: DISCONTINUED | OUTPATIENT
Start: 2025-04-01 | End: 2025-04-08 | Stop reason: HOSPADM

## 2025-04-01 RX ORDER — TRAMADOL HYDROCHLORIDE 50 MG/1
50 TABLET ORAL EVERY 6 HOURS PRN
Status: ON HOLD | COMMUNITY
End: 2025-04-08 | Stop reason: HOSPADM

## 2025-04-01 RX ORDER — FERROUS SULFATE 325(65) MG
325 TABLET ORAL
Status: DISCONTINUED | OUTPATIENT
Start: 2025-04-01 | End: 2025-04-08 | Stop reason: HOSPADM

## 2025-04-01 RX ORDER — ONDANSETRON 2 MG/ML
4 INJECTION INTRAMUSCULAR; INTRAVENOUS EVERY 6 HOURS PRN
Status: DISCONTINUED | OUTPATIENT
Start: 2025-04-01 | End: 2025-04-08 | Stop reason: HOSPADM

## 2025-04-01 RX ORDER — GLUCAGON 1 MG/ML
1 KIT INJECTION PRN
Status: DISCONTINUED | OUTPATIENT
Start: 2025-04-01 | End: 2025-04-03 | Stop reason: SDUPTHER

## 2025-04-01 RX ORDER — LEVOTHYROXINE SODIUM 75 UG/1
75 TABLET ORAL
Status: DISCONTINUED | OUTPATIENT
Start: 2025-04-01 | End: 2025-04-08 | Stop reason: HOSPADM

## 2025-04-01 RX ORDER — OXYCODONE HYDROCHLORIDE 5 MG/1
5 TABLET ORAL EVERY 4 HOURS PRN
Refills: 0 | Status: DISCONTINUED | OUTPATIENT
Start: 2025-04-01 | End: 2025-04-02

## 2025-04-01 RX ORDER — DEXTROSE MONOHYDRATE 100 MG/ML
INJECTION, SOLUTION INTRAVENOUS CONTINUOUS PRN
Status: DISCONTINUED | OUTPATIENT
Start: 2025-04-01 | End: 2025-04-03 | Stop reason: SDUPTHER

## 2025-04-01 RX ORDER — FENOFIBRATE 160 MG/1
160 TABLET ORAL DAILY
Status: DISCONTINUED | OUTPATIENT
Start: 2025-04-01 | End: 2025-04-08 | Stop reason: HOSPADM

## 2025-04-01 RX ORDER — ACETAMINOPHEN 325 MG/1
650 TABLET ORAL EVERY 6 HOURS PRN
Status: DISCONTINUED | OUTPATIENT
Start: 2025-04-01 | End: 2025-04-08 | Stop reason: HOSPADM

## 2025-04-01 RX ORDER — MAGNESIUM SULFATE IN WATER 40 MG/ML
2000 INJECTION, SOLUTION INTRAVENOUS PRN
Status: DISCONTINUED | OUTPATIENT
Start: 2025-04-01 | End: 2025-04-08 | Stop reason: HOSPADM

## 2025-04-01 RX ORDER — BUPROPION HYDROCHLORIDE 300 MG/1
300 TABLET ORAL EVERY MORNING
Status: DISCONTINUED | OUTPATIENT
Start: 2025-04-01 | End: 2025-04-08 | Stop reason: HOSPADM

## 2025-04-01 RX ORDER — ATORVASTATIN CALCIUM 40 MG/1
40 TABLET, FILM COATED ORAL NIGHTLY
Status: DISCONTINUED | OUTPATIENT
Start: 2025-04-01 | End: 2025-04-08 | Stop reason: HOSPADM

## 2025-04-01 RX ORDER — ROPINIROLE 0.5 MG/1
0.5 TABLET, FILM COATED ORAL NIGHTLY
Status: DISCONTINUED | OUTPATIENT
Start: 2025-04-01 | End: 2025-04-08 | Stop reason: HOSPADM

## 2025-04-01 RX ORDER — BUDESONIDE AND FORMOTEROL FUMARATE DIHYDRATE 80; 4.5 UG/1; UG/1
2 AEROSOL RESPIRATORY (INHALATION)
Status: DISCONTINUED | OUTPATIENT
Start: 2025-04-01 | End: 2025-04-08 | Stop reason: HOSPADM

## 2025-04-01 RX ORDER — SODIUM CHLORIDE 0.9 % (FLUSH) 0.9 %
5-40 SYRINGE (ML) INJECTION EVERY 12 HOURS SCHEDULED
Status: DISCONTINUED | OUTPATIENT
Start: 2025-04-01 | End: 2025-04-08 | Stop reason: HOSPADM

## 2025-04-01 RX ORDER — CARVEDILOL 3.12 MG/1
3.12 TABLET ORAL 2 TIMES DAILY WITH MEALS
Status: DISCONTINUED | OUTPATIENT
Start: 2025-04-01 | End: 2025-04-08 | Stop reason: HOSPADM

## 2025-04-01 RX ORDER — LATANOPROST 50 UG/ML
1 SOLUTION/ DROPS OPHTHALMIC NIGHTLY
Status: ON HOLD | COMMUNITY
End: 2025-04-08

## 2025-04-01 RX ORDER — INSULIN LISPRO 100 [IU]/ML
0-4 INJECTION, SOLUTION INTRAVENOUS; SUBCUTANEOUS
Status: DISCONTINUED | OUTPATIENT
Start: 2025-04-01 | End: 2025-04-03

## 2025-04-01 RX ORDER — ACETAMINOPHEN 650 MG/1
650 SUPPOSITORY RECTAL EVERY 6 HOURS PRN
Status: DISCONTINUED | OUTPATIENT
Start: 2025-04-01 | End: 2025-04-08 | Stop reason: HOSPADM

## 2025-04-01 RX ORDER — DIVALPROEX SODIUM 125 MG/1
125 CAPSULE, COATED PELLETS ORAL 2 TIMES DAILY
Status: DISCONTINUED | OUTPATIENT
Start: 2025-04-01 | End: 2025-04-08 | Stop reason: HOSPADM

## 2025-04-01 RX ORDER — ONDANSETRON 4 MG/1
4 TABLET, ORALLY DISINTEGRATING ORAL EVERY 8 HOURS PRN
Status: DISCONTINUED | OUTPATIENT
Start: 2025-04-01 | End: 2025-04-08 | Stop reason: HOSPADM

## 2025-04-01 RX ORDER — SODIUM CHLORIDE 9 MG/ML
INJECTION, SOLUTION INTRAVENOUS PRN
Status: DISCONTINUED | OUTPATIENT
Start: 2025-04-01 | End: 2025-04-08 | Stop reason: HOSPADM

## 2025-04-01 RX ORDER — TRAZODONE HYDROCHLORIDE 100 MG/1
200 TABLET ORAL NIGHTLY
Status: DISCONTINUED | OUTPATIENT
Start: 2025-04-01 | End: 2025-04-08 | Stop reason: HOSPADM

## 2025-04-01 RX ORDER — LEVOTHYROXINE SODIUM 150 UG/1
150 TABLET ORAL WEEKLY
Status: DISCONTINUED | OUTPATIENT
Start: 2025-04-06 | End: 2025-04-08 | Stop reason: HOSPADM

## 2025-04-01 RX ORDER — GUAIFENESIN AND DEXTROMETHORPHAN HYDROBROMIDE 10; 100 MG/5ML; MG/5ML
5 SYRUP ORAL EVERY 4 HOURS PRN
Status: ON HOLD | COMMUNITY
End: 2025-04-08

## 2025-04-01 RX ORDER — INSULIN GLARGINE 100 [IU]/ML
10 INJECTION, SOLUTION SUBCUTANEOUS NIGHTLY
Status: DISCONTINUED | OUTPATIENT
Start: 2025-04-01 | End: 2025-04-08 | Stop reason: HOSPADM

## 2025-04-01 RX ORDER — FLUTICASONE PROPIONATE 50 MCG
1 SPRAY, SUSPENSION (ML) NASAL 2 TIMES DAILY PRN
Status: DISCONTINUED | OUTPATIENT
Start: 2025-04-01 | End: 2025-04-07

## 2025-04-01 RX ORDER — HEPARIN SODIUM 5000 [USP'U]/ML
5000 INJECTION, SOLUTION INTRAVENOUS; SUBCUTANEOUS EVERY 8 HOURS SCHEDULED
Status: DISCONTINUED | OUTPATIENT
Start: 2025-04-01 | End: 2025-04-08 | Stop reason: HOSPADM

## 2025-04-01 RX ORDER — PANTOPRAZOLE SODIUM 40 MG/1
40 TABLET, DELAYED RELEASE ORAL
Status: DISCONTINUED | OUTPATIENT
Start: 2025-04-01 | End: 2025-04-08 | Stop reason: HOSPADM

## 2025-04-01 RX ORDER — POTASSIUM CHLORIDE 7.45 MG/ML
10 INJECTION INTRAVENOUS PRN
Status: DISCONTINUED | OUTPATIENT
Start: 2025-04-01 | End: 2025-04-08 | Stop reason: HOSPADM

## 2025-04-01 RX ORDER — FOLIC ACID 1 MG/1
1 TABLET ORAL DAILY
Status: DISCONTINUED | OUTPATIENT
Start: 2025-04-01 | End: 2025-04-08 | Stop reason: HOSPADM

## 2025-04-01 RX ORDER — ESCITALOPRAM OXALATE 20 MG/1
20 TABLET ORAL DAILY
Status: DISCONTINUED | OUTPATIENT
Start: 2025-04-01 | End: 2025-04-08 | Stop reason: HOSPADM

## 2025-04-01 RX ORDER — POLYETHYLENE GLYCOL 3350 17 G/17G
17 POWDER, FOR SOLUTION ORAL DAILY PRN
Status: DISCONTINUED | OUTPATIENT
Start: 2025-04-01 | End: 2025-04-08 | Stop reason: HOSPADM

## 2025-04-01 RX ADMIN — ACETAMINOPHEN 650 MG: 325 TABLET ORAL at 08:17

## 2025-04-01 RX ADMIN — DOCUSATE SODIUM 100 MG: 100 CAPSULE, LIQUID FILLED ORAL at 08:17

## 2025-04-01 RX ADMIN — PIPERACILLIN AND TAZOBACTAM 4500 MG: 4; .5 INJECTION, POWDER, LYOPHILIZED, FOR SOLUTION INTRAVENOUS at 05:47

## 2025-04-01 RX ADMIN — TIOTROPIUM BROMIDE INHALATION SPRAY 2 PUFF: 3.12 SPRAY, METERED RESPIRATORY (INHALATION) at 08:08

## 2025-04-01 RX ADMIN — DIVALPROEX SODIUM 125 MG: 125 CAPSULE ORAL at 19:37

## 2025-04-01 RX ADMIN — FLUTICASONE PROPIONATE 1 SPRAY: 50 SPRAY, METERED NASAL at 08:17

## 2025-04-01 RX ADMIN — OXYCODONE 5 MG: 5 TABLET ORAL at 17:09

## 2025-04-01 RX ADMIN — ROPINIROLE HYDROCHLORIDE 0.5 MG: 0.5 TABLET, FILM COATED ORAL at 19:36

## 2025-04-01 RX ADMIN — PIPERACILLIN AND TAZOBACTAM 3375 MG: 3; .375 INJECTION, POWDER, FOR SOLUTION INTRAVENOUS at 11:54

## 2025-04-01 RX ADMIN — ESCITALOPRAM OXALATE 20 MG: 20 TABLET ORAL at 08:17

## 2025-04-01 RX ADMIN — ONDANSETRON 4 MG: 2 INJECTION, SOLUTION INTRAMUSCULAR; INTRAVENOUS at 10:20

## 2025-04-01 RX ADMIN — DOCUSATE SODIUM 100 MG: 100 CAPSULE, LIQUID FILLED ORAL at 19:37

## 2025-04-01 RX ADMIN — CARVEDILOL 3.12 MG: 3.12 TABLET, FILM COATED ORAL at 08:17

## 2025-04-01 RX ADMIN — FERROUS SULFATE TAB 325 MG (65 MG ELEMENTAL FE) 325 MG: 325 (65 FE) TAB at 08:17

## 2025-04-01 RX ADMIN — HEPARIN SODIUM 5000 UNITS: 5000 INJECTION INTRAVENOUS; SUBCUTANEOUS at 22:14

## 2025-04-01 RX ADMIN — AMLODIPINE BESYLATE 10 MG: 10 TABLET ORAL at 08:17

## 2025-04-01 RX ADMIN — TRAZODONE HYDROCHLORIDE 200 MG: 100 TABLET ORAL at 19:37

## 2025-04-01 RX ADMIN — Medication 2000 MG: at 03:03

## 2025-04-01 RX ADMIN — INSULIN LISPRO 1 UNITS: 100 INJECTION, SOLUTION INTRAVENOUS; SUBCUTANEOUS at 17:10

## 2025-04-01 RX ADMIN — OXYCODONE 5 MG: 5 TABLET ORAL at 22:13

## 2025-04-01 RX ADMIN — HEPARIN SODIUM 5000 UNITS: 5000 INJECTION INTRAVENOUS; SUBCUTANEOUS at 13:12

## 2025-04-01 RX ADMIN — ACETAMINOPHEN 650 MG: 325 TABLET ORAL at 14:35

## 2025-04-01 RX ADMIN — BUPROPION HYDROCHLORIDE 300 MG: 300 TABLET, EXTENDED RELEASE ORAL at 08:17

## 2025-04-01 RX ADMIN — BUDESONIDE AND FORMOTEROL FUMARATE DIHYDRATE 2 PUFF: 80; 4.5 AEROSOL RESPIRATORY (INHALATION) at 08:08

## 2025-04-01 RX ADMIN — HEPARIN SODIUM 5000 UNITS: 5000 INJECTION INTRAVENOUS; SUBCUTANEOUS at 05:47

## 2025-04-01 RX ADMIN — DIVALPROEX SODIUM 125 MG: 125 CAPSULE ORAL at 08:17

## 2025-04-01 RX ADMIN — FENOFIBRATE 160 MG: 160 TABLET ORAL at 08:17

## 2025-04-01 RX ADMIN — FOLIC ACID 1 MG: 1 TABLET ORAL at 08:17

## 2025-04-01 RX ADMIN — BUDESONIDE AND FORMOTEROL FUMARATE DIHYDRATE 2 PUFF: 80; 4.5 AEROSOL RESPIRATORY (INHALATION) at 19:39

## 2025-04-01 RX ADMIN — CARVEDILOL 3.12 MG: 3.12 TABLET, FILM COATED ORAL at 16:04

## 2025-04-01 RX ADMIN — PANTOPRAZOLE SODIUM 40 MG: 40 TABLET, DELAYED RELEASE ORAL at 16:04

## 2025-04-01 RX ADMIN — ATORVASTATIN CALCIUM 40 MG: 40 TABLET, FILM COATED ORAL at 19:37

## 2025-04-01 RX ADMIN — SODIUM CHLORIDE, PRESERVATIVE FREE 10 ML: 5 INJECTION INTRAVENOUS at 08:22

## 2025-04-01 RX ADMIN — PIPERACILLIN AND TAZOBACTAM 3375 MG: 3; .375 INJECTION, POWDER, FOR SOLUTION INTRAVENOUS at 19:07

## 2025-04-01 ASSESSMENT — PAIN - FUNCTIONAL ASSESSMENT
PAIN_FUNCTIONAL_ASSESSMENT: ACTIVITIES ARE NOT PREVENTED

## 2025-04-01 ASSESSMENT — PAIN DESCRIPTION - ORIENTATION
ORIENTATION: MID
ORIENTATION: LEFT
ORIENTATION: LEFT

## 2025-04-01 ASSESSMENT — PAIN DESCRIPTION - DESCRIPTORS
DESCRIPTORS: ACHING
DESCRIPTORS: SORE
DESCRIPTORS: ACHING
DESCRIPTORS: SORE

## 2025-04-01 ASSESSMENT — PAIN DESCRIPTION - LOCATION
LOCATION: HEAD
LOCATION: BACK

## 2025-04-01 ASSESSMENT — PAIN SCALES - GENERAL
PAINLEVEL_OUTOF10: 9
PAINLEVEL_OUTOF10: 8
PAINLEVEL_OUTOF10: 10

## 2025-04-01 ASSESSMENT — PAIN DESCRIPTION - PAIN TYPE: TYPE: ACUTE PAIN

## 2025-04-01 NOTE — ED PROVIDER NOTES
RADIOLOGY:  XR CHEST PORTABLE   Final Result   Impression:      No acute processes      This document has been electronically signed by: Anil Sky MD on    03/31/2025 10:55 PM      CT ABDOMEN PELVIS W IV CONTRAST Additional Contrast? None   Final Result   Impression:      Left retroperitoneal fluid collection, possible recurrent abscess      This document has been electronically signed by: Anil Sky MD on    03/31/2025 10:46 PM      All CTs at this facility use dose modulation techniques and iterative    reconstructions, and/or weight-based dosing   when appropriate to reduce radiation to a low as reasonably achievable.      CT SOFT TISSUE NECK W CONTRAST   Final Result   Impression:      No significant abnormalities.      This document has been electronically signed by: Anil Sky MD on    03/31/2025 10:42 PM      All CTs at this facility use dose modulation techniques and iterative    reconstructions, and/or weight-based dosing   when appropriate to reduce radiation to a low as reasonably achievable.          MEDICAL DECISION MAKING:  This patient is a 69 y.o. Female with a past medical history of left renal abscess status post partial nephrectomy presented to the ED with lower back pain and headache.  CT abdomen and pelvis is remarkable for retroperitoneal fluid with concerns for recurrent abscess.  Patient endorsed left lower quadrant pain but denies fever with urinary symptoms.  No systemic signs of white count noted in ED.  Patient however started on antibiotics and admitted with plan for inpatient urology consult.    FINAL IMPRESSION:  1. Acute nonintractable headache, unspecified headache type    2. Acute left-sided low back pain with left-sided sciatica    3. Retroperitoneal fluid collection         
lower jaw swelling as well and there was minor swelling noted so CT soft tissue neck was also obtained.  Patient was given droperidol for her headache and Norflex for the left lower back pain with improvement in symptoms.  She was sleeping on my reassessment prior to her obtaining her CTs.  She did have an elevated troponin but denied any chest pain and reported chronic shortness of breath with no worsening from baseline.  Pending the completion of patient's delta troponin and CTs patient was signed out to Dr. Zafar please see his note for final disposition.    /  ED Course as of 04/01/25 0138   Mon Mar 31, 2025   2210 On reassessment patient was sleeping and I woke up her up for assessment and she stated that she feels a little bit better but still has the headache. [AA]   2225 Glucose, Ur(!): 250 [PS]   2225 CARBON DIOXIDE: 24 [PS]   2227 Signout at this time  69-year-old female with extensive medical history presented to the ED with chief complaint of headache.  Imaging of the abdomen pelvis as patient endorsed abdominal pain with history of renal abscess.  However, patient not sick appearing.  Patient felt better after droperidol administration.  Repeat Trope is pending as first was elevated.  Potential discharge pending trops. [PS]      ED Course User Index  [AA] Annie Valencia MD  [PS] Perfecto Velasquez MD     Vitals Reviewed:    Vitals:    03/31/25 1927 03/31/25 2042 03/31/25 2142   BP: (!) 141/87 (!) 160/84 132/71   Pulse: 70 72 64   Resp: 18 16 18   Temp: 98.1 °F (36.7 °C)     TempSrc: Oral     SpO2: 97% 96% 94%   Weight: 77.1 kg (170 lb)     Height: 1.626 m (5' 4\")         The patient was seen and examined. Appropriate diagnostic testing was performed and results reviewed with the patient and/or caregivers.    The results of pertinent diagnostic studies and exam findings were discussed. The patient’s provisional diagnosis and plan of care were discussed with the patient and present

## 2025-04-01 NOTE — DISCHARGE INSTR - COC
Continuity of Care Form    Patient Name: Elli Coulter   :  1955  MRN:  [id number]    Admit date:  3/31/2025  Discharge date:  4/3/2025    Code Status Order: Full Code   Advance Directives:     Admitting Physician:  No admitting provider for patient encounter.  PCP: Ryan Montalvo MD    Discharging Nurse: Sergio  Discharging Hospital Unit/Room#: 8B-26/026-A  Discharging Unit Phone Number: [phone]    Emergency Contact:   Extended Emergency Contact Information  Primary Emergency Contact: SREEKANTH BAEZ  Address: [address] [place] [address]  Home Phone: [phone]  Mobile Phone: [phone]  Relation: Niece/Nephew  Secondary Emergency Contact: Bailey Trimble   [facility]  Home Phone: [phone]  Mobile Phone: [phone]  Relation: Brother/Sister  Hearing or visual needs: None  Other needs: None  Preferred language: English   needed? No    Past Surgical History:  Past Surgical History:   Procedure Laterality Date    ABDOMEN SURGERY      x4 removed cysts and ovary removed    CARDIAC SURGERY      heart caths, no stents    CARPAL TUNNEL RELEASE Bilateral 2016    CHOLECYSTECTOMY, LAPAROSCOPIC  2015    Dr. Huff    COLONOSCOPY      CT ABSCESS DRAINAGE SOFT TISSUE  2024    CT DRAIN SOFT TISSUE ABSCESS 2024 [facility] CT SCAN    CYSTOSCOPY N/A 02/10/2022    CYSTOSCOPY URETHRAL IMPLANT INJECTION performed by Sami Lanier MD at [facility] OR    CYSTOSCOPY N/A 2022    Cystoscopy Bladder Botox 200 units Left Stent Placement performed by Sami Lanier MD at [facility] OR    CYSTOSCOPY N/A 2023    CYSTOSCOPY WITH BLADDER BOTOX 200 UNITS performed by Sami Lanier MD at [facility] OR    CYSTOSCOPY W BIOPSY OF BLADDER N/A 2020    CYSTOSCOPY WITH BLADDER BIOPSIES performed by Chris Avila MD at [facility] OR    DILATATION, ESOPHAGUS      EGD      ELBOW SURGERY Right 10/07/2004    Dr. Cronin    ELBOW SURGERY Bilateral

## 2025-04-01 NOTE — ED NOTES
Cultures collected by conrado RN and zi PUTNAM. Handoff report given to Zi PUTNAM.   
Phone call placed to NICK Salazar to approve pt transport to [facility] in stable condition.  
Pt resting in bed with eyes closed. RR easy and unlabored. No distress noted. Call light in reach.  
Report received from Jose PUTNAM. Cultures collected as ordered. Pt remains alert and oriented. RR regular and unlabored. Call light in reach. Unmet needs denied.  
10/11/2024    Paresthesias     Pneumonia 07/24/2014    Polysubstance abuse (HCC)     Renal hematoma, left 01/15/2023    Right arm weakness 05/06/2019    Seasonal allergies     sneezing    Sexual problems     Substance-induced psychotic disorder (HCC) 10/26/2018    Suicidal thoughts     2015 admitted to 4E from [facility]    Thyroid disease     Tobacco abuse     Tobacco use disorder 02/18/2018    Urinary incontinence     Urinary tract infection in female 01/10/2024    Vomiting     Wears dentures     Wears glasses            Electronically signed by Jose Francisco Castillo RN on 4/1/2025 at 1:51 AM     
Statement Selected

## 2025-04-02 LAB
ANION GAP SERPL CALC-SCNC: 9 MEQ/L (ref 8–16)
BASOPHILS ABSOLUTE: 0 THOU/MM3 (ref 0–0.1)
BASOPHILS NFR BLD AUTO: 0.5 %
BUN SERPL-MCNC: 17 MG/DL (ref 8–23)
CALCIUM SERPL-MCNC: 10.2 MG/DL (ref 8.8–10.2)
CHLORIDE SERPL-SCNC: 104 MEQ/L (ref 98–111)
CO2 SERPL-SCNC: 23 MEQ/L (ref 22–29)
CREAT SERPL-MCNC: 1 MG/DL (ref 0.5–0.9)
DEPRECATED RDW RBC AUTO: 46.9 FL (ref 35–45)
EOSINOPHIL NFR BLD AUTO: 2.6 %
EOSINOPHILS ABSOLUTE: 0.1 THOU/MM3 (ref 0–0.4)
ERYTHROCYTE [DISTWIDTH] IN BLOOD BY AUTOMATED COUNT: 15.5 % (ref 11.5–14.5)
GFR SERPL CREATININE-BSD FRML MDRD: 61 ML/MIN/1.73M2
GLUCOSE BLD STRIP.AUTO-MCNC: 152 MG/DL (ref 70–108)
GLUCOSE BLD STRIP.AUTO-MCNC: 166 MG/DL (ref 70–108)
GLUCOSE BLD STRIP.AUTO-MCNC: 209 MG/DL (ref 70–108)
GLUCOSE BLD STRIP.AUTO-MCNC: 223 MG/DL (ref 70–108)
GLUCOSE SERPL-MCNC: 165 MG/DL (ref 74–109)
HCT VFR BLD AUTO: 36.6 % (ref 37–47)
HGB BLD-MCNC: 11.7 GM/DL (ref 12–16)
IMM GRANULOCYTES # BLD AUTO: 0.03 THOU/MM3 (ref 0–0.07)
IMM GRANULOCYTES NFR BLD AUTO: 0.5 %
LYMPHOCYTES ABSOLUTE: 1.9 THOU/MM3 (ref 1–4.8)
LYMPHOCYTES NFR BLD AUTO: 32.5 %
MCH RBC QN AUTO: 26.5 PG (ref 26–33)
MCHC RBC AUTO-ENTMCNC: 32 GM/DL (ref 32.2–35.5)
MCV RBC AUTO: 82.8 FL (ref 81–99)
MONOCYTES ABSOLUTE: 0.5 THOU/MM3 (ref 0.4–1.3)
MONOCYTES NFR BLD AUTO: 8.9 %
NEUTROPHILS ABSOLUTE: 3.1 THOU/MM3 (ref 1.8–7.7)
NEUTROPHILS NFR BLD AUTO: 55 %
NRBC BLD AUTO-RTO: 0 /100 WBC
PLATELET # BLD AUTO: 244 THOU/MM3 (ref 130–400)
PMV BLD AUTO: 8.9 FL (ref 9.4–12.4)
POTASSIUM SERPL-SCNC: 4.6 MEQ/L (ref 3.5–5.2)
RBC # BLD AUTO: 4.42 MILL/MM3 (ref 4.2–5.4)
SODIUM SERPL-SCNC: 136 MEQ/L (ref 135–145)
VANCOMYCIN SERPL-MCNC: 14.4 UG/ML (ref 10–39.9)
WBC # BLD AUTO: 5.7 THOU/MM3 (ref 4.8–10.8)

## 2025-04-02 PROCEDURE — 99233 SBSQ HOSP IP/OBS HIGH 50: CPT | Performed by: NURSE PRACTITIONER

## 2025-04-02 PROCEDURE — G0378 HOSPITAL OBSERVATION PER HR: HCPCS

## 2025-04-02 PROCEDURE — 6370000000 HC RX 637 (ALT 250 FOR IP)

## 2025-04-02 PROCEDURE — 85025 COMPLETE CBC W/AUTO DIFF WBC: CPT

## 2025-04-02 PROCEDURE — 80202 ASSAY OF VANCOMYCIN: CPT

## 2025-04-02 PROCEDURE — 96368 THER/DIAG CONCURRENT INF: CPT

## 2025-04-02 PROCEDURE — 2580000003 HC RX 258

## 2025-04-02 PROCEDURE — 80048 BASIC METABOLIC PNL TOTAL CA: CPT

## 2025-04-02 PROCEDURE — 36415 COLL VENOUS BLD VENIPUNCTURE: CPT

## 2025-04-02 PROCEDURE — 82948 REAGENT STRIP/BLOOD GLUCOSE: CPT

## 2025-04-02 PROCEDURE — 96372 THER/PROPH/DIAG INJ SC/IM: CPT

## 2025-04-02 PROCEDURE — 6360000002 HC RX W HCPCS

## 2025-04-02 PROCEDURE — 94640 AIRWAY INHALATION TREATMENT: CPT

## 2025-04-02 PROCEDURE — 6370000000 HC RX 637 (ALT 250 FOR IP): Performed by: INTERNAL MEDICINE

## 2025-04-02 PROCEDURE — 96366 THER/PROPH/DIAG IV INF ADDON: CPT

## 2025-04-02 RX ORDER — OXYCODONE HYDROCHLORIDE 5 MG/1
5 TABLET ORAL EVERY 4 HOURS PRN
Refills: 0 | Status: DISCONTINUED | OUTPATIENT
Start: 2025-04-02 | End: 2025-04-08 | Stop reason: HOSPADM

## 2025-04-02 RX ORDER — OXYCODONE HYDROCHLORIDE 5 MG/1
5 TABLET ORAL ONCE
Refills: 0 | Status: COMPLETED | OUTPATIENT
Start: 2025-04-02 | End: 2025-04-02

## 2025-04-02 RX ORDER — OXYCODONE HYDROCHLORIDE 5 MG/1
10 TABLET ORAL EVERY 4 HOURS PRN
Refills: 0 | Status: DISCONTINUED | OUTPATIENT
Start: 2025-04-02 | End: 2025-04-06

## 2025-04-02 RX ADMIN — OXYCODONE 5 MG: 5 TABLET ORAL at 21:33

## 2025-04-02 RX ADMIN — LEVOTHYROXINE SODIUM 75 MCG: 0.07 TABLET ORAL at 05:26

## 2025-04-02 RX ADMIN — INSULIN LISPRO 1 UNITS: 100 INJECTION, SOLUTION INTRAVENOUS; SUBCUTANEOUS at 11:31

## 2025-04-02 RX ADMIN — TIOTROPIUM BROMIDE INHALATION SPRAY 2 PUFF: 3.12 SPRAY, METERED RESPIRATORY (INHALATION) at 10:01

## 2025-04-02 RX ADMIN — DIVALPROEX SODIUM 125 MG: 125 CAPSULE ORAL at 19:27

## 2025-04-02 RX ADMIN — VANCOMYCIN HYDROCHLORIDE 1500 MG: 5 INJECTION, POWDER, LYOPHILIZED, FOR SOLUTION INTRAVENOUS at 03:51

## 2025-04-02 RX ADMIN — PANTOPRAZOLE SODIUM 40 MG: 40 TABLET, DELAYED RELEASE ORAL at 16:19

## 2025-04-02 RX ADMIN — OXYCODONE 5 MG: 5 TABLET ORAL at 07:58

## 2025-04-02 RX ADMIN — INSULIN LISPRO 1 UNITS: 100 INJECTION, SOLUTION INTRAVENOUS; SUBCUTANEOUS at 19:36

## 2025-04-02 RX ADMIN — HEPARIN SODIUM 5000 UNITS: 5000 INJECTION INTRAVENOUS; SUBCUTANEOUS at 13:05

## 2025-04-02 RX ADMIN — OXYCODONE 5 MG: 5 TABLET ORAL at 17:22

## 2025-04-02 RX ADMIN — PANTOPRAZOLE SODIUM 40 MG: 40 TABLET, DELAYED RELEASE ORAL at 05:26

## 2025-04-02 RX ADMIN — ATORVASTATIN CALCIUM 40 MG: 40 TABLET, FILM COATED ORAL at 19:27

## 2025-04-02 RX ADMIN — FOLIC ACID 1 MG: 1 TABLET ORAL at 07:59

## 2025-04-02 RX ADMIN — BUDESONIDE AND FORMOTEROL FUMARATE DIHYDRATE 2 PUFF: 80; 4.5 AEROSOL RESPIRATORY (INHALATION) at 20:26

## 2025-04-02 RX ADMIN — HEPARIN SODIUM 5000 UNITS: 5000 INJECTION INTRAVENOUS; SUBCUTANEOUS at 05:27

## 2025-04-02 RX ADMIN — DIVALPROEX SODIUM 125 MG: 125 CAPSULE ORAL at 08:00

## 2025-04-02 RX ADMIN — CARVEDILOL 3.12 MG: 3.12 TABLET, FILM COATED ORAL at 08:01

## 2025-04-02 RX ADMIN — BUPROPION HYDROCHLORIDE 300 MG: 300 TABLET, EXTENDED RELEASE ORAL at 08:00

## 2025-04-02 RX ADMIN — ROPINIROLE HYDROCHLORIDE 0.5 MG: 0.5 TABLET, FILM COATED ORAL at 19:27

## 2025-04-02 RX ADMIN — DOCUSATE SODIUM 100 MG: 100 CAPSULE, LIQUID FILLED ORAL at 07:59

## 2025-04-02 RX ADMIN — PIPERACILLIN AND TAZOBACTAM 3375 MG: 3; .375 INJECTION, POWDER, FOR SOLUTION INTRAVENOUS at 03:48

## 2025-04-02 RX ADMIN — BUDESONIDE AND FORMOTEROL FUMARATE DIHYDRATE 2 PUFF: 80; 4.5 AEROSOL RESPIRATORY (INHALATION) at 10:01

## 2025-04-02 RX ADMIN — TRAZODONE HYDROCHLORIDE 200 MG: 100 TABLET ORAL at 19:34

## 2025-04-02 RX ADMIN — PIPERACILLIN AND TAZOBACTAM 3375 MG: 3; .375 INJECTION, POWDER, FOR SOLUTION INTRAVENOUS at 11:14

## 2025-04-02 RX ADMIN — OXYCODONE 5 MG: 5 TABLET ORAL at 23:27

## 2025-04-02 RX ADMIN — AMLODIPINE BESYLATE 10 MG: 10 TABLET ORAL at 07:59

## 2025-04-02 RX ADMIN — PIPERACILLIN AND TAZOBACTAM 3375 MG: 3; .375 INJECTION, POWDER, FOR SOLUTION INTRAVENOUS at 18:46

## 2025-04-02 RX ADMIN — HEPARIN SODIUM 5000 UNITS: 5000 INJECTION INTRAVENOUS; SUBCUTANEOUS at 19:37

## 2025-04-02 RX ADMIN — ESCITALOPRAM OXALATE 20 MG: 20 TABLET ORAL at 08:01

## 2025-04-02 RX ADMIN — OXYCODONE 5 MG: 5 TABLET ORAL at 13:04

## 2025-04-02 RX ADMIN — FENOFIBRATE 160 MG: 160 TABLET ORAL at 08:00

## 2025-04-02 RX ADMIN — CARVEDILOL 3.12 MG: 3.12 TABLET, FILM COATED ORAL at 16:19

## 2025-04-02 ASSESSMENT — PAIN SCALES - GENERAL
PAINLEVEL_OUTOF10: 9
PAINLEVEL_OUTOF10: 9
PAINLEVEL_OUTOF10: 8
PAINLEVEL_OUTOF10: 6

## 2025-04-02 ASSESSMENT — PAIN DESCRIPTION - DESCRIPTORS
DESCRIPTORS: DISCOMFORT
DESCRIPTORS: DISCOMFORT;ACHING
DESCRIPTORS: DISCOMFORT;ACHING

## 2025-04-02 ASSESSMENT — PAIN DESCRIPTION - LOCATION
LOCATION: BACK

## 2025-04-02 ASSESSMENT — PAIN DESCRIPTION - ORIENTATION
ORIENTATION: LEFT;LOWER
ORIENTATION: LOWER;LEFT
ORIENTATION: LOWER;MID

## 2025-04-02 NOTE — CONSULTS
[place] [id number]                              CONSULTATION      PATIENT NAME: PATRICK BUTCHER            : 1955  MED REC NO: [id number]                       ROOM: [facility]  ACCOUNT NO: [id number]                       ADMIT DATE: 2025  PROVIDER: Nikos Virk MD      CONSULT DATE: 2025    REASON FOR CONSULTATION:  Retroperitoneal fluid collection.    HISTORY OF PRESENT ILLNESS:  She is a 69-year-old female patient well known to me from her past hospitalization.  She had a previous history of left flank fluid with abscess required multiple hospitalization drainage.  Subsequently, she had left nephrectomy for left perinephric abscess.  After the surgery, she had a small fluid collection that required drainage at one time.  She was admitted to the hospital because she has left-sided flank pain and headache.  She reports that the flank pain radiates towards her legs.  It gets worse when she is moving.  She reports that she had history of degenerative joint disease and years ago, she had steroid injections.  She denies any fever or chills.  She had a CAT scan that showed residual fluid collection, but much smaller than before.  She denies any dysuria, frequency, or urgency.    PAST MEDICAL HISTORY:  Significant for history of cystitis, history of alcohol use disorder, allergic rhinitis, osteoarthritis, history of bipolar disorder, cocaine and cannabis use, COPD, depression, history of diverticulosis, erosive esophagitis, hard of hearing, history of kidney stones, PE, and history of psychosis.    SURGICAL HISTORY:  Includes abdominal surgery, cardiac catheterization, carpal tunnel releases, cholecystectomy laparoscopic, colonoscopy, CT abscess drainage on multiple occasions, cystoscopy, left nephrectomy, elbow surgery, hysterectomy, mandible fracture surgery, rotator cuff repair, shoulder surgery, and skin 
frequency, hematuria and urgency.   Musculoskeletal:  Positive for back pain (low left).   All other systems reviewed and are negative.        PHYSICAL EXAM:  VITALS:  BP (!) 125/54   Pulse 57   Temp 99 °F (37.2 °C) (Oral)   Resp 18   Ht 1.626 m (5' 4\")   Wt 77.1 kg (170 lb)   SpO2 94%   BMI 29.18 kg/m² .  Nursing note and vitals reviewed.    Physical Exam  Vitals and nursing note reviewed.   Constitutional:       General: She is not in acute distress.     Appearance: She is obese. She is not ill-appearing.   HENT:      Head: Normocephalic and atraumatic.      Right Ear: External ear normal.      Left Ear: External ear normal.      Nose: Nose normal.      Mouth/Throat:      Mouth: Mucous membranes are moist.   Cardiovascular:      Pulses: Normal pulses.      Heart sounds: Normal heart sounds.   Pulmonary:      Effort: Pulmonary effort is normal. No respiratory distress.      Breath sounds: Normal breath sounds.   Abdominal:      Palpations: Abdomen is soft.      Tenderness: There is no abdominal tenderness.   Musculoskeletal:      Cervical back: Neck supple.        Back:    Skin:     General: Skin is warm and dry.   Neurological:      Mental Status: She is alert. Mental status is at baseline.   Psychiatric:         Mood and Affect: Mood normal.         Behavior: Behavior normal.           DATA:  CBC:   Lab Results   Component Value Date/Time    WBC 8.1 03/31/2025 09:00 PM    RBC 4.69 03/31/2025 09:00 PM    RBC 4.41 04/19/2012 10:25 AM    HGB 12.4 03/31/2025 09:00 PM    HGB 13.1 04/19/2012 10:25 AM    HCT 38.4 03/31/2025 09:00 PM    MCV 81.9 03/31/2025 09:00 PM    MCH 26.4 03/31/2025 09:00 PM    MCHC 32.3 03/31/2025 09:00 PM    RDW 14.3 01/15/2025 08:14 PM     03/31/2025 09:00 PM    MPV 9.1 03/31/2025 09:00 PM     BMP:    Lab Results   Component Value Date/Time     03/31/2025 09:00 PM    K 4.2 03/31/2025 09:00 PM    K 4.2 02/09/2025 05:05 AM    CL 98 03/31/2025 09:00 PM    CO2 24 03/31/2025 09:00

## 2025-04-03 ENCOUNTER — APPOINTMENT (OUTPATIENT)
Dept: MRI IMAGING | Age: 70
DRG: 863 | End: 2025-04-03
Payer: MEDICARE

## 2025-04-03 PROBLEM — M54.50 LOW BACK PAIN: Status: ACTIVE | Noted: 2025-04-03

## 2025-04-03 LAB
GLUCOSE BLD STRIP.AUTO-MCNC: 132 MG/DL (ref 70–108)
GLUCOSE BLD STRIP.AUTO-MCNC: 144 MG/DL (ref 70–108)
GLUCOSE BLD STRIP.AUTO-MCNC: 178 MG/DL (ref 70–108)
GLUCOSE BLD STRIP.AUTO-MCNC: 281 MG/DL (ref 70–108)

## 2025-04-03 PROCEDURE — 6370000000 HC RX 637 (ALT 250 FOR IP): Performed by: NURSE PRACTITIONER

## 2025-04-03 PROCEDURE — 6360000002 HC RX W HCPCS

## 2025-04-03 PROCEDURE — 96372 THER/PROPH/DIAG INJ SC/IM: CPT

## 2025-04-03 PROCEDURE — 94760 N-INVAS EAR/PLS OXIMETRY 1: CPT

## 2025-04-03 PROCEDURE — 94640 AIRWAY INHALATION TREATMENT: CPT

## 2025-04-03 PROCEDURE — 6360000002 HC RX W HCPCS: Performed by: NURSE PRACTITIONER

## 2025-04-03 PROCEDURE — 82948 REAGENT STRIP/BLOOD GLUCOSE: CPT

## 2025-04-03 PROCEDURE — 6370000000 HC RX 637 (ALT 250 FOR IP)

## 2025-04-03 PROCEDURE — 2580000003 HC RX 258

## 2025-04-03 PROCEDURE — 99233 SBSQ HOSP IP/OBS HIGH 50: CPT | Performed by: NURSE PRACTITIONER

## 2025-04-03 PROCEDURE — 2500000003 HC RX 250 WO HCPCS

## 2025-04-03 PROCEDURE — 72148 MRI LUMBAR SPINE W/O DYE: CPT

## 2025-04-03 PROCEDURE — 96366 THER/PROPH/DIAG IV INF ADDON: CPT

## 2025-04-03 PROCEDURE — 1200000000 HC SEMI PRIVATE

## 2025-04-03 RX ORDER — GUAIFENESIN/DEXTROMETHORPHAN 100-10MG/5
5 SYRUP ORAL EVERY 4 HOURS PRN
Status: DISCONTINUED | OUTPATIENT
Start: 2025-04-03 | End: 2025-04-08 | Stop reason: HOSPADM

## 2025-04-03 RX ORDER — LORAZEPAM 2 MG/ML
1 INJECTION INTRAMUSCULAR ONCE
Status: COMPLETED | OUTPATIENT
Start: 2025-04-03 | End: 2025-04-03

## 2025-04-03 RX ORDER — DEXTROSE MONOHYDRATE 100 MG/ML
INJECTION, SOLUTION INTRAVENOUS CONTINUOUS PRN
Status: DISCONTINUED | OUTPATIENT
Start: 2025-04-03 | End: 2025-04-08 | Stop reason: HOSPADM

## 2025-04-03 RX ORDER — INSULIN LISPRO 100 [IU]/ML
0-8 INJECTION, SOLUTION INTRAVENOUS; SUBCUTANEOUS
Status: DISCONTINUED | OUTPATIENT
Start: 2025-04-03 | End: 2025-04-08 | Stop reason: HOSPADM

## 2025-04-03 RX ORDER — GLUCAGON 1 MG/ML
1 KIT INJECTION PRN
Status: DISCONTINUED | OUTPATIENT
Start: 2025-04-03 | End: 2025-04-08 | Stop reason: HOSPADM

## 2025-04-03 RX ADMIN — PANTOPRAZOLE SODIUM 40 MG: 40 TABLET, DELAYED RELEASE ORAL at 04:29

## 2025-04-03 RX ADMIN — INSULIN LISPRO 4 UNITS: 100 INJECTION, SOLUTION INTRAVENOUS; SUBCUTANEOUS at 18:14

## 2025-04-03 RX ADMIN — VANCOMYCIN HYDROCHLORIDE 1500 MG: 5 INJECTION, POWDER, LYOPHILIZED, FOR SOLUTION INTRAVENOUS at 02:52

## 2025-04-03 RX ADMIN — ESCITALOPRAM OXALATE 20 MG: 20 TABLET ORAL at 08:20

## 2025-04-03 RX ADMIN — HEPARIN SODIUM 5000 UNITS: 5000 INJECTION INTRAVENOUS; SUBCUTANEOUS at 04:28

## 2025-04-03 RX ADMIN — LEVOTHYROXINE SODIUM 75 MCG: 0.07 TABLET ORAL at 04:29

## 2025-04-03 RX ADMIN — PANTOPRAZOLE SODIUM 40 MG: 40 TABLET, DELAYED RELEASE ORAL at 15:06

## 2025-04-03 RX ADMIN — TIOTROPIUM BROMIDE INHALATION SPRAY 2 PUFF: 3.12 SPRAY, METERED RESPIRATORY (INHALATION) at 08:50

## 2025-04-03 RX ADMIN — ATORVASTATIN CALCIUM 40 MG: 40 TABLET, FILM COATED ORAL at 21:19

## 2025-04-03 RX ADMIN — FENOFIBRATE 160 MG: 160 TABLET ORAL at 08:20

## 2025-04-03 RX ADMIN — HEPARIN SODIUM 5000 UNITS: 5000 INJECTION INTRAVENOUS; SUBCUTANEOUS at 15:05

## 2025-04-03 RX ADMIN — PIPERACILLIN AND TAZOBACTAM 3375 MG: 3; .375 INJECTION, POWDER, FOR SOLUTION INTRAVENOUS at 04:28

## 2025-04-03 RX ADMIN — CARVEDILOL 3.12 MG: 3.12 TABLET, FILM COATED ORAL at 08:21

## 2025-04-03 RX ADMIN — AMLODIPINE BESYLATE 10 MG: 10 TABLET ORAL at 08:21

## 2025-04-03 RX ADMIN — LORAZEPAM 1 MG: 2 INJECTION INTRAMUSCULAR; INTRAVENOUS at 11:45

## 2025-04-03 RX ADMIN — PIPERACILLIN AND TAZOBACTAM 3375 MG: 3; .375 INJECTION, POWDER, FOR SOLUTION INTRAVENOUS at 15:07

## 2025-04-03 RX ADMIN — HEPARIN SODIUM 5000 UNITS: 5000 INJECTION INTRAVENOUS; SUBCUTANEOUS at 21:19

## 2025-04-03 RX ADMIN — OXYCODONE 10 MG: 5 TABLET ORAL at 18:54

## 2025-04-03 RX ADMIN — PIPERACILLIN AND TAZOBACTAM 3375 MG: 3; .375 INJECTION, POWDER, FOR SOLUTION INTRAVENOUS at 23:18

## 2025-04-03 RX ADMIN — BUPROPION HYDROCHLORIDE 300 MG: 300 TABLET, EXTENDED RELEASE ORAL at 08:20

## 2025-04-03 RX ADMIN — DIVALPROEX SODIUM 125 MG: 125 CAPSULE ORAL at 08:20

## 2025-04-03 RX ADMIN — DIVALPROEX SODIUM 125 MG: 125 CAPSULE ORAL at 21:19

## 2025-04-03 RX ADMIN — TRAZODONE HYDROCHLORIDE 200 MG: 100 TABLET ORAL at 21:19

## 2025-04-03 RX ADMIN — OXYCODONE 10 MG: 5 TABLET ORAL at 08:27

## 2025-04-03 RX ADMIN — FOLIC ACID 1 MG: 1 TABLET ORAL at 08:21

## 2025-04-03 RX ADMIN — SODIUM CHLORIDE, PRESERVATIVE FREE 10 ML: 5 INJECTION INTRAVENOUS at 21:19

## 2025-04-03 RX ADMIN — OXYCODONE 10 MG: 5 TABLET ORAL at 02:55

## 2025-04-03 RX ADMIN — BUDESONIDE AND FORMOTEROL FUMARATE DIHYDRATE 2 PUFF: 80; 4.5 AEROSOL RESPIRATORY (INHALATION) at 08:50

## 2025-04-03 RX ADMIN — INSULIN GLARGINE 10 UNITS: 100 INJECTION, SOLUTION SUBCUTANEOUS at 21:20

## 2025-04-03 RX ADMIN — CARVEDILOL 3.12 MG: 3.12 TABLET, FILM COATED ORAL at 18:14

## 2025-04-03 RX ADMIN — BUDESONIDE AND FORMOTEROL FUMARATE DIHYDRATE 2 PUFF: 80; 4.5 AEROSOL RESPIRATORY (INHALATION) at 20:43

## 2025-04-03 RX ADMIN — ROPINIROLE HYDROCHLORIDE 0.5 MG: 0.5 TABLET, FILM COATED ORAL at 21:19

## 2025-04-03 ASSESSMENT — PAIN DESCRIPTION - ORIENTATION
ORIENTATION: LEFT;LOWER

## 2025-04-03 ASSESSMENT — PAIN DESCRIPTION - DESCRIPTORS
DESCRIPTORS: ACHING
DESCRIPTORS: ACHING
DESCRIPTORS: ACHING;DISCOMFORT
DESCRIPTORS: ACHING;DISCOMFORT

## 2025-04-03 ASSESSMENT — PAIN SCALES - GENERAL
PAINLEVEL_OUTOF10: 7
PAINLEVEL_OUTOF10: 0
PAINLEVEL_OUTOF10: 9
PAINLEVEL_OUTOF10: 7

## 2025-04-03 ASSESSMENT — PAIN DESCRIPTION - LOCATION
LOCATION: BACK

## 2025-04-04 ENCOUNTER — APPOINTMENT (OUTPATIENT)
Dept: CT IMAGING | Age: 70
DRG: 863 | End: 2025-04-04
Attending: NURSE PRACTITIONER
Payer: MEDICARE

## 2025-04-04 LAB
BACTERIA URNS QL MICRO: ABNORMAL /HPF
BILIRUB UR QL STRIP.AUTO: NEGATIVE
CASTS #/AREA URNS LPF: ABNORMAL /LPF
CASTS 2: ABNORMAL /LPF
CHARACTER UR: ABNORMAL
COLOR, UA: ABNORMAL
CRYSTALS URNS MICRO: ABNORMAL
EPITHELIAL CELLS, UA: ABNORMAL /HPF
GLUCOSE BLD STRIP.AUTO-MCNC: 135 MG/DL (ref 70–108)
GLUCOSE BLD STRIP.AUTO-MCNC: 182 MG/DL (ref 70–108)
GLUCOSE BLD STRIP.AUTO-MCNC: 217 MG/DL (ref 70–108)
GLUCOSE BLD STRIP.AUTO-MCNC: 219 MG/DL (ref 70–108)
GLUCOSE UR QL STRIP.AUTO: 500 MG/DL
HGB UR QL STRIP.AUTO: NEGATIVE
KETONES UR QL STRIP.AUTO: NEGATIVE
MISCELLANEOUS 2: ABNORMAL
NITRITE UR QL STRIP: NEGATIVE
PH UR STRIP.AUTO: 6 [PH] (ref 5–9)
PROT UR STRIP.AUTO-MCNC: NEGATIVE MG/DL
RBC URINE: ABNORMAL /HPF
RENAL EPI CELLS #/AREA URNS HPF: ABNORMAL /[HPF]
SP GR UR REFRACT.AUTO: 1.02 (ref 1–1.03)
UROBILINOGEN, URINE: 0.2 EU/DL (ref 0–1)
WBC #/AREA URNS HPF: ABNORMAL /HPF
WBC #/AREA URNS HPF: ABNORMAL /[HPF]
YEAST LIKE FUNGI URNS QL MICRO: ABNORMAL

## 2025-04-04 PROCEDURE — 2709999900 CT ABSCESS DRAINAGE SOFT TISSUE

## 2025-04-04 PROCEDURE — 0W9G3ZZ DRAINAGE OF PERITONEAL CAVITY, PERCUTANEOUS APPROACH: ICD-10-PCS | Performed by: RADIOLOGY

## 2025-04-04 PROCEDURE — 87205 SMEAR GRAM STAIN: CPT

## 2025-04-04 PROCEDURE — 6370000000 HC RX 637 (ALT 250 FOR IP): Performed by: NURSE PRACTITIONER

## 2025-04-04 PROCEDURE — 97535 SELF CARE MNGMENT TRAINING: CPT

## 2025-04-04 PROCEDURE — 2500000003 HC RX 250 WO HCPCS

## 2025-04-04 PROCEDURE — 94640 AIRWAY INHALATION TREATMENT: CPT

## 2025-04-04 PROCEDURE — 2580000003 HC RX 258

## 2025-04-04 PROCEDURE — 6370000000 HC RX 637 (ALT 250 FOR IP)

## 2025-04-04 PROCEDURE — 82948 REAGENT STRIP/BLOOD GLUCOSE: CPT

## 2025-04-04 PROCEDURE — 6360000002 HC RX W HCPCS

## 2025-04-04 PROCEDURE — 87077 CULTURE AEROBIC IDENTIFY: CPT

## 2025-04-04 PROCEDURE — 87075 CULTR BACTERIA EXCEPT BLOOD: CPT

## 2025-04-04 PROCEDURE — 87070 CULTURE OTHR SPECIMN AEROBIC: CPT

## 2025-04-04 PROCEDURE — 81001 URINALYSIS AUTO W/SCOPE: CPT

## 2025-04-04 PROCEDURE — 87186 SC STD MICRODIL/AGAR DIL: CPT

## 2025-04-04 PROCEDURE — 97166 OT EVAL MOD COMPLEX 45 MIN: CPT

## 2025-04-04 PROCEDURE — 1200000000 HC SEMI PRIVATE

## 2025-04-04 PROCEDURE — 99233 SBSQ HOSP IP/OBS HIGH 50: CPT | Performed by: NURSE PRACTITIONER

## 2025-04-04 PROCEDURE — 94669 MECHANICAL CHEST WALL OSCILL: CPT

## 2025-04-04 PROCEDURE — 6360000002 HC RX W HCPCS: Performed by: RADIOLOGY

## 2025-04-04 RX ORDER — FENTANYL CITRATE 50 UG/ML
INJECTION, SOLUTION INTRAMUSCULAR; INTRAVENOUS PRN
Status: COMPLETED | OUTPATIENT
Start: 2025-04-04 | End: 2025-04-04

## 2025-04-04 RX ORDER — MIDAZOLAM HYDROCHLORIDE 1 MG/ML
INJECTION, SOLUTION INTRAMUSCULAR; INTRAVENOUS PRN
Status: COMPLETED | OUTPATIENT
Start: 2025-04-04 | End: 2025-04-04

## 2025-04-04 RX ADMIN — SODIUM CHLORIDE, PRESERVATIVE FREE 10 ML: 5 INJECTION INTRAVENOUS at 20:44

## 2025-04-04 RX ADMIN — AMLODIPINE BESYLATE 10 MG: 10 TABLET ORAL at 09:02

## 2025-04-04 RX ADMIN — DIVALPROEX SODIUM 125 MG: 125 CAPSULE ORAL at 20:43

## 2025-04-04 RX ADMIN — BUPROPION HYDROCHLORIDE 300 MG: 300 TABLET, EXTENDED RELEASE ORAL at 09:01

## 2025-04-04 RX ADMIN — ATORVASTATIN CALCIUM 40 MG: 40 TABLET, FILM COATED ORAL at 20:43

## 2025-04-04 RX ADMIN — PIPERACILLIN AND TAZOBACTAM 3375 MG: 3; .375 INJECTION, POWDER, FOR SOLUTION INTRAVENOUS at 15:25

## 2025-04-04 RX ADMIN — TIOTROPIUM BROMIDE INHALATION SPRAY 2 PUFF: 3.12 SPRAY, METERED RESPIRATORY (INHALATION) at 08:01

## 2025-04-04 RX ADMIN — ESCITALOPRAM OXALATE 20 MG: 20 TABLET ORAL at 09:01

## 2025-04-04 RX ADMIN — INSULIN GLARGINE 10 UNITS: 100 INJECTION, SOLUTION SUBCUTANEOUS at 20:45

## 2025-04-04 RX ADMIN — PIPERACILLIN AND TAZOBACTAM 3375 MG: 3; .375 INJECTION, POWDER, FOR SOLUTION INTRAVENOUS at 06:24

## 2025-04-04 RX ADMIN — INSULIN LISPRO 2 UNITS: 100 INJECTION, SOLUTION INTRAVENOUS; SUBCUTANEOUS at 17:53

## 2025-04-04 RX ADMIN — CARVEDILOL 3.12 MG: 3.12 TABLET, FILM COATED ORAL at 17:54

## 2025-04-04 RX ADMIN — FENTANYL CITRATE 50 MCG: 50 INJECTION, SOLUTION INTRAMUSCULAR; INTRAVENOUS at 14:55

## 2025-04-04 RX ADMIN — ROPINIROLE HYDROCHLORIDE 0.5 MG: 0.5 TABLET, FILM COATED ORAL at 20:43

## 2025-04-04 RX ADMIN — INSULIN LISPRO 2 UNITS: 100 INJECTION, SOLUTION INTRAVENOUS; SUBCUTANEOUS at 20:44

## 2025-04-04 RX ADMIN — DIVALPROEX SODIUM 125 MG: 125 CAPSULE ORAL at 09:01

## 2025-04-04 RX ADMIN — PANTOPRAZOLE SODIUM 40 MG: 40 TABLET, DELAYED RELEASE ORAL at 15:29

## 2025-04-04 RX ADMIN — HEPARIN SODIUM 5000 UNITS: 5000 INJECTION INTRAVENOUS; SUBCUTANEOUS at 06:22

## 2025-04-04 RX ADMIN — CARVEDILOL 3.12 MG: 3.12 TABLET, FILM COATED ORAL at 09:01

## 2025-04-04 RX ADMIN — TRAZODONE HYDROCHLORIDE 200 MG: 100 TABLET ORAL at 23:05

## 2025-04-04 RX ADMIN — HEPARIN SODIUM 5000 UNITS: 5000 INJECTION INTRAVENOUS; SUBCUTANEOUS at 23:06

## 2025-04-04 RX ADMIN — OXYCODONE 10 MG: 5 TABLET ORAL at 20:43

## 2025-04-04 RX ADMIN — BUDESONIDE AND FORMOTEROL FUMARATE DIHYDRATE 2 PUFF: 80; 4.5 AEROSOL RESPIRATORY (INHALATION) at 08:01

## 2025-04-04 RX ADMIN — BUDESONIDE AND FORMOTEROL FUMARATE DIHYDRATE 2 PUFF: 80; 4.5 AEROSOL RESPIRATORY (INHALATION) at 22:18

## 2025-04-04 RX ADMIN — MIDAZOLAM 1 MG: 1 INJECTION INTRAMUSCULAR; INTRAVENOUS at 14:55

## 2025-04-04 RX ADMIN — PANTOPRAZOLE SODIUM 40 MG: 40 TABLET, DELAYED RELEASE ORAL at 06:22

## 2025-04-04 RX ADMIN — FOLIC ACID 1 MG: 1 TABLET ORAL at 09:01

## 2025-04-04 RX ADMIN — OXYCODONE 10 MG: 5 TABLET ORAL at 15:29

## 2025-04-04 RX ADMIN — FERROUS SULFATE TAB 325 MG (65 MG ELEMENTAL FE) 325 MG: 325 (65 FE) TAB at 09:01

## 2025-04-04 RX ADMIN — PIPERACILLIN AND TAZOBACTAM 3375 MG: 3; .375 INJECTION, POWDER, FOR SOLUTION INTRAVENOUS at 23:04

## 2025-04-04 RX ADMIN — FENOFIBRATE 160 MG: 160 TABLET ORAL at 09:01

## 2025-04-04 RX ADMIN — OXYCODONE 10 MG: 5 TABLET ORAL at 08:58

## 2025-04-04 RX ADMIN — LEVOTHYROXINE SODIUM 75 MCG: 0.07 TABLET ORAL at 06:22

## 2025-04-04 RX ADMIN — VANCOMYCIN HYDROCHLORIDE 1500 MG: 5 INJECTION, POWDER, LYOPHILIZED, FOR SOLUTION INTRAVENOUS at 03:19

## 2025-04-04 ASSESSMENT — PAIN SCALES - WONG BAKER: WONGBAKER_NUMERICALRESPONSE: NO HURT

## 2025-04-04 ASSESSMENT — PAIN DESCRIPTION - LOCATION
LOCATION: BACK
LOCATION: ABDOMEN
LOCATION: ABDOMEN
LOCATION: BACK
LOCATION: ABDOMEN

## 2025-04-04 ASSESSMENT — PAIN SCALES - GENERAL
PAINLEVEL_OUTOF10: 7
PAINLEVEL_OUTOF10: 6
PAINLEVEL_OUTOF10: 5
PAINLEVEL_OUTOF10: 8
PAINLEVEL_OUTOF10: 0
PAINLEVEL_OUTOF10: 7
PAINLEVEL_OUTOF10: 10

## 2025-04-04 ASSESSMENT — PAIN DESCRIPTION - DESCRIPTORS: DESCRIPTORS: ACHING;DISCOMFORT

## 2025-04-04 ASSESSMENT — PAIN DESCRIPTION - ORIENTATION: ORIENTATION: LOWER

## 2025-04-04 NOTE — H&P
appropriate candidate to undergo the planned procedure sedation and anesthesia. (Refer to nursing sedation/analgesia documentation record)  [x]Formulation and discussion of sedation/procedure plan, risks, and expectations with patient and/or responsible adult completed.  [x]Patient examined immediately prior to the procedure. (Refer to nursing sedation/analgesia documentation record)    Anshul Pablo MD, MD  Electronically signed 4/4/2025 at 2:51 PM    
wbc   BMP    Collection Time: 03/31/25  9:00 PM   Result Value Ref Range    Sodium 132 (L) 135 - 145 meq/L    Potassium 4.2 3.5 - 5.2 meq/L    Chloride 98 98 - 111 meq/L    CO2 24 22 - 29 meq/L    Glucose 109 74 - 109 mg/dL    BUN 17 8 - 23 mg/dL    Creatinine 0.9 0.5 - 0.9 mg/dL    Calcium 10.2 8.8 - 10.2 mg/dL   Hepatic Function Panel    Collection Time: 03/31/25  9:00 PM   Result Value Ref Range    Albumin 4.1 3.4 - 4.9 g/dL    Total Bilirubin 0.3 0.3 - 1.2 mg/dL    Bilirubin, Direct 0.2 0.0 - 0.2 mg/dL    Alkaline Phosphatase 62 35 - 104 U/L    AST 25 10 - 35 U/L    ALT 32 10 - 35 U/L    Total Protein 7.5 6.4 - 8.3 g/dL   Lactate, Sepsis    Collection Time: 03/31/25  9:00 PM   Result Value Ref Range    Lactic Acid, Sepsis 1.0 0.5 - 1.9 mmol/L   C-Reactive Protein    Collection Time: 03/31/25  9:00 PM   Result Value Ref Range    CRP 6.41 (H) 0.00 - 0.50 mg/dl   Urinalysis with Reflex to Culture    Collection Time: 03/31/25  9:00 PM    Specimen: Urine   Result Value Ref Range    Glucose, Ur 250 (A) NEGATIVE mg/dl    Bilirubin, Urine NEGATIVE NEGATIVE    Ketones, Urine NEGATIVE NEGATIVE    Specific Gravity, UA 1.013 1.002 - 1.030    Blood, Urine NEGATIVE NEGATIVE    pH, Urine 6.5 5.0 - 9.0    Protein, UA NEGATIVE NEGATIVE    Urobilinogen, Urine 0.2 0.0 - 1.0 eu/dl    Nitrite, Urine NEGATIVE NEGATIVE    Leukocyte Esterase, Urine NEGATIVE NEGATIVE    Color, UA YELLOW STRAW-YELLOW    Character, Urine CLEAR CLEAR-SL CLOUD   Troponin    Collection Time: 03/31/25  9:00 PM   Result Value Ref Range    Troponin, High Sensitivity 19 (H) 0 - 12 ng/L   Magnesium    Collection Time: 03/31/25  9:00 PM   Result Value Ref Range    Magnesium 1.9 1.6 - 2.6 mg/dL   Lipase    Collection Time: 03/31/25  9:00 PM   Result Value Ref Range    Lipase 39.0 13.0 - 60.0 U/L   Anion Gap    Collection Time: 03/31/25  9:00 PM   Result Value Ref Range    Anion Gap 10.0 8.0 - 16.0 meq/L   Glomerular Filtration Rate, Estimated    Collection Time:

## 2025-04-04 NOTE — OP NOTE
Department of Radiology  Post Procedure Progress Note      Pre-Procedure Diagnosis:  Left retroperitoneal fluid collection    Procedure Performed:  CT guided drain placement    Anesthesia: local / versed and fentanyl    Findings: successful    Immediate Complications:  None    Estimated Blood Loss: minimal    SEE DICTATED PROCEDURE NOTE FOR COMPLETE DETAILS.    Electronically signed by Anshul Pablo MD on 4/4/2025 at 4:13 PM

## 2025-04-05 LAB
GLUCOSE BLD STRIP.AUTO-MCNC: 197 MG/DL (ref 70–108)
GLUCOSE BLD STRIP.AUTO-MCNC: 231 MG/DL (ref 70–108)
GLUCOSE BLD STRIP.AUTO-MCNC: 237 MG/DL (ref 70–108)
GLUCOSE BLD STRIP.AUTO-MCNC: 285 MG/DL (ref 70–108)

## 2025-04-05 PROCEDURE — 6360000002 HC RX W HCPCS

## 2025-04-05 PROCEDURE — 6370000000 HC RX 637 (ALT 250 FOR IP)

## 2025-04-05 PROCEDURE — 6370000000 HC RX 637 (ALT 250 FOR IP): Performed by: NURSE PRACTITIONER

## 2025-04-05 PROCEDURE — 82948 REAGENT STRIP/BLOOD GLUCOSE: CPT

## 2025-04-05 PROCEDURE — 2580000003 HC RX 258

## 2025-04-05 PROCEDURE — 6360000002 HC RX W HCPCS: Performed by: NURSE PRACTITIONER

## 2025-04-05 PROCEDURE — 1200000000 HC SEMI PRIVATE

## 2025-04-05 PROCEDURE — 2500000003 HC RX 250 WO HCPCS

## 2025-04-05 PROCEDURE — 94640 AIRWAY INHALATION TREATMENT: CPT

## 2025-04-05 PROCEDURE — 94669 MECHANICAL CHEST WALL OSCILL: CPT

## 2025-04-05 PROCEDURE — 99232 SBSQ HOSP IP/OBS MODERATE 35: CPT | Performed by: NURSE PRACTITIONER

## 2025-04-05 RX ORDER — KETOROLAC TROMETHAMINE 30 MG/ML
15 INJECTION, SOLUTION INTRAMUSCULAR; INTRAVENOUS EVERY 6 HOURS PRN
Status: DISCONTINUED | OUTPATIENT
Start: 2025-04-05 | End: 2025-04-08 | Stop reason: HOSPADM

## 2025-04-05 RX ADMIN — SODIUM CHLORIDE, PRESERVATIVE FREE 10 ML: 5 INJECTION INTRAVENOUS at 21:54

## 2025-04-05 RX ADMIN — DIVALPROEX SODIUM 125 MG: 125 CAPSULE ORAL at 21:55

## 2025-04-05 RX ADMIN — BUPROPION HYDROCHLORIDE 300 MG: 300 TABLET, EXTENDED RELEASE ORAL at 08:24

## 2025-04-05 RX ADMIN — CARVEDILOL 3.12 MG: 3.12 TABLET, FILM COATED ORAL at 17:49

## 2025-04-05 RX ADMIN — HEPARIN SODIUM 5000 UNITS: 5000 INJECTION INTRAVENOUS; SUBCUTANEOUS at 21:55

## 2025-04-05 RX ADMIN — TIOTROPIUM BROMIDE INHALATION SPRAY 2 PUFF: 3.12 SPRAY, METERED RESPIRATORY (INHALATION) at 09:45

## 2025-04-05 RX ADMIN — OXYCODONE 10 MG: 5 TABLET ORAL at 17:48

## 2025-04-05 RX ADMIN — SODIUM CHLORIDE, PRESERVATIVE FREE 10 ML: 5 INJECTION INTRAVENOUS at 08:24

## 2025-04-05 RX ADMIN — DOCUSATE SODIUM 100 MG: 100 CAPSULE, LIQUID FILLED ORAL at 08:26

## 2025-04-05 RX ADMIN — OXYCODONE 10 MG: 5 TABLET ORAL at 08:22

## 2025-04-05 RX ADMIN — FENOFIBRATE 160 MG: 160 TABLET ORAL at 08:24

## 2025-04-05 RX ADMIN — ROPINIROLE HYDROCHLORIDE 0.5 MG: 0.5 TABLET, FILM COATED ORAL at 21:55

## 2025-04-05 RX ADMIN — PIPERACILLIN AND TAZOBACTAM 3375 MG: 3; .375 INJECTION, POWDER, FOR SOLUTION INTRAVENOUS at 23:20

## 2025-04-05 RX ADMIN — ESCITALOPRAM OXALATE 20 MG: 20 TABLET ORAL at 08:24

## 2025-04-05 RX ADMIN — BUDESONIDE AND FORMOTEROL FUMARATE DIHYDRATE 2 PUFF: 80; 4.5 AEROSOL RESPIRATORY (INHALATION) at 22:01

## 2025-04-05 RX ADMIN — INSULIN LISPRO 2 UNITS: 100 INJECTION, SOLUTION INTRAVENOUS; SUBCUTANEOUS at 09:10

## 2025-04-05 RX ADMIN — PIPERACILLIN AND TAZOBACTAM 3375 MG: 3; .375 INJECTION, POWDER, FOR SOLUTION INTRAVENOUS at 15:52

## 2025-04-05 RX ADMIN — PIPERACILLIN AND TAZOBACTAM 3375 MG: 3; .375 INJECTION, POWDER, FOR SOLUTION INTRAVENOUS at 06:34

## 2025-04-05 RX ADMIN — FOLIC ACID 1 MG: 1 TABLET ORAL at 08:24

## 2025-04-05 RX ADMIN — PANTOPRAZOLE SODIUM 40 MG: 40 TABLET, DELAYED RELEASE ORAL at 06:34

## 2025-04-05 RX ADMIN — KETOROLAC TROMETHAMINE 15 MG: 30 INJECTION, SOLUTION INTRAMUSCULAR at 22:02

## 2025-04-05 RX ADMIN — INSULIN GLARGINE 10 UNITS: 100 INJECTION, SOLUTION SUBCUTANEOUS at 21:55

## 2025-04-05 RX ADMIN — DIVALPROEX SODIUM 125 MG: 125 CAPSULE ORAL at 08:24

## 2025-04-05 RX ADMIN — AMLODIPINE BESYLATE 10 MG: 10 TABLET ORAL at 08:25

## 2025-04-05 RX ADMIN — ATORVASTATIN CALCIUM 40 MG: 40 TABLET, FILM COATED ORAL at 21:55

## 2025-04-05 RX ADMIN — INSULIN LISPRO 2 UNITS: 100 INJECTION, SOLUTION INTRAVENOUS; SUBCUTANEOUS at 17:47

## 2025-04-05 RX ADMIN — HEPARIN SODIUM 5000 UNITS: 5000 INJECTION INTRAVENOUS; SUBCUTANEOUS at 06:34

## 2025-04-05 RX ADMIN — TRAZODONE HYDROCHLORIDE 200 MG: 100 TABLET ORAL at 21:55

## 2025-04-05 RX ADMIN — VANCOMYCIN HYDROCHLORIDE 1500 MG: 5 INJECTION, POWDER, LYOPHILIZED, FOR SOLUTION INTRAVENOUS at 03:07

## 2025-04-05 RX ADMIN — KETOROLAC TROMETHAMINE 15 MG: 30 INJECTION, SOLUTION INTRAMUSCULAR at 11:30

## 2025-04-05 RX ADMIN — ONDANSETRON 4 MG: 2 INJECTION, SOLUTION INTRAMUSCULAR; INTRAVENOUS at 23:15

## 2025-04-05 RX ADMIN — INSULIN LISPRO 2 UNITS: 100 INJECTION, SOLUTION INTRAVENOUS; SUBCUTANEOUS at 12:56

## 2025-04-05 RX ADMIN — CARVEDILOL 3.12 MG: 3.12 TABLET, FILM COATED ORAL at 08:24

## 2025-04-05 RX ADMIN — PANTOPRAZOLE SODIUM 40 MG: 40 TABLET, DELAYED RELEASE ORAL at 17:48

## 2025-04-05 RX ADMIN — INSULIN LISPRO 4 UNITS: 100 INJECTION, SOLUTION INTRAVENOUS; SUBCUTANEOUS at 21:56

## 2025-04-05 RX ADMIN — BUDESONIDE AND FORMOTEROL FUMARATE DIHYDRATE 2 PUFF: 80; 4.5 AEROSOL RESPIRATORY (INHALATION) at 09:45

## 2025-04-05 RX ADMIN — HEPARIN SODIUM 5000 UNITS: 5000 INJECTION INTRAVENOUS; SUBCUTANEOUS at 12:57

## 2025-04-05 RX ADMIN — LEVOTHYROXINE SODIUM 75 MCG: 0.07 TABLET ORAL at 06:34

## 2025-04-05 RX ADMIN — DOCUSATE SODIUM 100 MG: 100 CAPSULE, LIQUID FILLED ORAL at 21:55

## 2025-04-05 ASSESSMENT — PAIN DESCRIPTION - DESCRIPTORS
DESCRIPTORS: ACHING
DESCRIPTORS: ACHING

## 2025-04-05 ASSESSMENT — PAIN SCALES - GENERAL
PAINLEVEL_OUTOF10: 8
PAINLEVEL_OUTOF10: 9
PAINLEVEL_OUTOF10: 7
PAINLEVEL_OUTOF10: 9
PAINLEVEL_OUTOF10: 6
PAINLEVEL_OUTOF10: 8

## 2025-04-05 ASSESSMENT — PAIN DESCRIPTION - LOCATION
LOCATION: ABDOMEN
LOCATION: BACK
LOCATION: BACK

## 2025-04-05 ASSESSMENT — PAIN SCALES - WONG BAKER: WONGBAKER_NUMERICALRESPONSE: NO HURT

## 2025-04-05 ASSESSMENT — PAIN DESCRIPTION - ORIENTATION
ORIENTATION: LOWER
ORIENTATION: LEFT

## 2025-04-06 LAB
ANION GAP SERPL CALC-SCNC: 11 MEQ/L (ref 8–16)
BACTERIA BLD AEROBE CULT: NORMAL
BACTERIA BLD AEROBE CULT: NORMAL
BUN SERPL-MCNC: 20 MG/DL (ref 8–23)
CALCIUM SERPL-MCNC: 10.7 MG/DL (ref 8.8–10.2)
CHLORIDE SERPL-SCNC: 100 MEQ/L (ref 98–111)
CO2 SERPL-SCNC: 25 MEQ/L (ref 22–29)
CREAT SERPL-MCNC: 0.9 MG/DL (ref 0.5–0.9)
DEPRECATED RDW RBC AUTO: 45.2 FL (ref 35–45)
ERYTHROCYTE [DISTWIDTH] IN BLOOD BY AUTOMATED COUNT: 15.2 % (ref 11.5–14.5)
GFR SERPL CREATININE-BSD FRML MDRD: 70 ML/MIN/1.73M2
GLUCOSE BLD STRIP.AUTO-MCNC: 151 MG/DL (ref 70–108)
GLUCOSE BLD STRIP.AUTO-MCNC: 182 MG/DL (ref 70–108)
GLUCOSE BLD STRIP.AUTO-MCNC: 274 MG/DL (ref 70–108)
GLUCOSE BLD STRIP.AUTO-MCNC: 289 MG/DL (ref 70–108)
GLUCOSE SERPL-MCNC: 183 MG/DL (ref 74–109)
HCT VFR BLD AUTO: 35.2 % (ref 37–47)
HGB BLD-MCNC: 11.5 GM/DL (ref 12–16)
MCH RBC QN AUTO: 26.8 PG (ref 26–33)
MCHC RBC AUTO-ENTMCNC: 32.7 GM/DL (ref 32.2–35.5)
MCV RBC AUTO: 82.1 FL (ref 81–99)
PLATELET # BLD AUTO: 288 THOU/MM3 (ref 130–400)
PMV BLD AUTO: 8.8 FL (ref 9.4–12.4)
POTASSIUM SERPL-SCNC: 4.3 MEQ/L (ref 3.5–5.2)
RBC # BLD AUTO: 4.29 MILL/MM3 (ref 4.2–5.4)
SODIUM SERPL-SCNC: 136 MEQ/L (ref 135–145)
WBC # BLD AUTO: 5.6 THOU/MM3 (ref 4.8–10.8)

## 2025-04-06 PROCEDURE — 6370000000 HC RX 637 (ALT 250 FOR IP)

## 2025-04-06 PROCEDURE — 85027 COMPLETE CBC AUTOMATED: CPT

## 2025-04-06 PROCEDURE — 1200000000 HC SEMI PRIVATE

## 2025-04-06 PROCEDURE — 2500000003 HC RX 250 WO HCPCS

## 2025-04-06 PROCEDURE — 82948 REAGENT STRIP/BLOOD GLUCOSE: CPT

## 2025-04-06 PROCEDURE — 6370000000 HC RX 637 (ALT 250 FOR IP): Performed by: NURSE PRACTITIONER

## 2025-04-06 PROCEDURE — 2580000003 HC RX 258

## 2025-04-06 PROCEDURE — 6360000002 HC RX W HCPCS

## 2025-04-06 PROCEDURE — 6360000002 HC RX W HCPCS: Performed by: NURSE PRACTITIONER

## 2025-04-06 PROCEDURE — 80048 BASIC METABOLIC PNL TOTAL CA: CPT

## 2025-04-06 PROCEDURE — 94669 MECHANICAL CHEST WALL OSCILL: CPT

## 2025-04-06 PROCEDURE — 94761 N-INVAS EAR/PLS OXIMETRY MLT: CPT

## 2025-04-06 PROCEDURE — 94640 AIRWAY INHALATION TREATMENT: CPT

## 2025-04-06 PROCEDURE — 99232 SBSQ HOSP IP/OBS MODERATE 35: CPT | Performed by: NURSE PRACTITIONER

## 2025-04-06 PROCEDURE — 36415 COLL VENOUS BLD VENIPUNCTURE: CPT

## 2025-04-06 RX ADMIN — FENOFIBRATE 160 MG: 160 TABLET ORAL at 09:49

## 2025-04-06 RX ADMIN — SODIUM CHLORIDE, PRESERVATIVE FREE 10 ML: 5 INJECTION INTRAVENOUS at 09:50

## 2025-04-06 RX ADMIN — ROPINIROLE HYDROCHLORIDE 0.5 MG: 0.5 TABLET, FILM COATED ORAL at 21:15

## 2025-04-06 RX ADMIN — DIVALPROEX SODIUM 125 MG: 125 CAPSULE ORAL at 09:50

## 2025-04-06 RX ADMIN — HEPARIN SODIUM 5000 UNITS: 5000 INJECTION INTRAVENOUS; SUBCUTANEOUS at 13:42

## 2025-04-06 RX ADMIN — BUPROPION HYDROCHLORIDE 300 MG: 300 TABLET, EXTENDED RELEASE ORAL at 09:50

## 2025-04-06 RX ADMIN — PIPERACILLIN AND TAZOBACTAM 3375 MG: 3; .375 INJECTION, POWDER, FOR SOLUTION INTRAVENOUS at 23:55

## 2025-04-06 RX ADMIN — INSULIN LISPRO 2 UNITS: 100 INJECTION, SOLUTION INTRAVENOUS; SUBCUTANEOUS at 13:42

## 2025-04-06 RX ADMIN — KETOROLAC TROMETHAMINE 15 MG: 30 INJECTION, SOLUTION INTRAMUSCULAR at 21:16

## 2025-04-06 RX ADMIN — DOCUSATE SODIUM 100 MG: 100 CAPSULE, LIQUID FILLED ORAL at 09:49

## 2025-04-06 RX ADMIN — BUDESONIDE AND FORMOTEROL FUMARATE DIHYDRATE 2 PUFF: 80; 4.5 AEROSOL RESPIRATORY (INHALATION) at 21:46

## 2025-04-06 RX ADMIN — CARVEDILOL 3.12 MG: 3.12 TABLET, FILM COATED ORAL at 17:43

## 2025-04-06 RX ADMIN — KETOROLAC TROMETHAMINE 15 MG: 30 INJECTION, SOLUTION INTRAMUSCULAR at 15:23

## 2025-04-06 RX ADMIN — HEPARIN SODIUM 5000 UNITS: 5000 INJECTION INTRAVENOUS; SUBCUTANEOUS at 21:15

## 2025-04-06 RX ADMIN — DIVALPROEX SODIUM 125 MG: 125 CAPSULE ORAL at 21:15

## 2025-04-06 RX ADMIN — INSULIN LISPRO 4 UNITS: 100 INJECTION, SOLUTION INTRAVENOUS; SUBCUTANEOUS at 21:16

## 2025-04-06 RX ADMIN — ACETAMINOPHEN 650 MG: 325 TABLET ORAL at 23:52

## 2025-04-06 RX ADMIN — PANTOPRAZOLE SODIUM 40 MG: 40 TABLET, DELAYED RELEASE ORAL at 15:23

## 2025-04-06 RX ADMIN — PIPERACILLIN AND TAZOBACTAM 3375 MG: 3; .375 INJECTION, POWDER, FOR SOLUTION INTRAVENOUS at 15:30

## 2025-04-06 RX ADMIN — ATORVASTATIN CALCIUM 40 MG: 40 TABLET, FILM COATED ORAL at 21:15

## 2025-04-06 RX ADMIN — BUDESONIDE AND FORMOTEROL FUMARATE DIHYDRATE 2 PUFF: 80; 4.5 AEROSOL RESPIRATORY (INHALATION) at 09:56

## 2025-04-06 RX ADMIN — TRAZODONE HYDROCHLORIDE 200 MG: 100 TABLET ORAL at 21:15

## 2025-04-06 RX ADMIN — OXYCODONE 10 MG: 5 TABLET ORAL at 09:56

## 2025-04-06 RX ADMIN — DOCUSATE SODIUM 100 MG: 100 CAPSULE, LIQUID FILLED ORAL at 21:15

## 2025-04-06 RX ADMIN — ESCITALOPRAM OXALATE 20 MG: 20 TABLET ORAL at 09:50

## 2025-04-06 RX ADMIN — SODIUM CHLORIDE, PRESERVATIVE FREE 10 ML: 5 INJECTION INTRAVENOUS at 21:16

## 2025-04-06 RX ADMIN — HEPARIN SODIUM 5000 UNITS: 5000 INJECTION INTRAVENOUS; SUBCUTANEOUS at 07:07

## 2025-04-06 RX ADMIN — FOLIC ACID 1 MG: 1 TABLET ORAL at 09:50

## 2025-04-06 RX ADMIN — INSULIN GLARGINE 10 UNITS: 100 INJECTION, SOLUTION SUBCUTANEOUS at 21:16

## 2025-04-06 RX ADMIN — TIOTROPIUM BROMIDE INHALATION SPRAY 2 PUFF: 3.12 SPRAY, METERED RESPIRATORY (INHALATION) at 09:56

## 2025-04-06 RX ADMIN — PIPERACILLIN AND TAZOBACTAM 3375 MG: 3; .375 INJECTION, POWDER, FOR SOLUTION INTRAVENOUS at 07:08

## 2025-04-06 RX ADMIN — PANTOPRAZOLE SODIUM 40 MG: 40 TABLET, DELAYED RELEASE ORAL at 07:06

## 2025-04-06 RX ADMIN — LEVOTHYROXINE SODIUM 150 MCG: 0.15 TABLET ORAL at 07:07

## 2025-04-06 RX ADMIN — AMLODIPINE BESYLATE 10 MG: 10 TABLET ORAL at 09:49

## 2025-04-06 RX ADMIN — INSULIN LISPRO 4 UNITS: 100 INJECTION, SOLUTION INTRAVENOUS; SUBCUTANEOUS at 17:43

## 2025-04-06 ASSESSMENT — PAIN DESCRIPTION - DESCRIPTORS
DESCRIPTORS: SHARP
DESCRIPTORS: SHARP

## 2025-04-06 ASSESSMENT — PAIN DESCRIPTION - PAIN TYPE: TYPE: ACUTE PAIN

## 2025-04-06 ASSESSMENT — PAIN DESCRIPTION - ORIENTATION
ORIENTATION: LEFT
ORIENTATION: LEFT;LOWER

## 2025-04-06 ASSESSMENT — PAIN SCALES - GENERAL
PAINLEVEL_OUTOF10: 8
PAINLEVEL_OUTOF10: 7
PAINLEVEL_OUTOF10: 6

## 2025-04-06 ASSESSMENT — PAIN DESCRIPTION - LOCATION
LOCATION: BACK
LOCATION: BACK

## 2025-04-06 ASSESSMENT — PAIN SCALES - WONG BAKER: WONGBAKER_NUMERICALRESPONSE: NO HURT

## 2025-04-06 ASSESSMENT — PAIN DESCRIPTION - ONSET: ONSET: ON-GOING

## 2025-04-06 ASSESSMENT — PAIN DESCRIPTION - FREQUENCY: FREQUENCY: CONTINUOUS

## 2025-04-07 LAB
GLUCOSE BLD STRIP.AUTO-MCNC: 145 MG/DL (ref 70–108)
GLUCOSE BLD STRIP.AUTO-MCNC: 189 MG/DL (ref 70–108)
GLUCOSE BLD STRIP.AUTO-MCNC: 194 MG/DL (ref 70–108)
GLUCOSE BLD STRIP.AUTO-MCNC: 238 MG/DL (ref 70–108)

## 2025-04-07 PROCEDURE — 6360000002 HC RX W HCPCS

## 2025-04-07 PROCEDURE — 6370000000 HC RX 637 (ALT 250 FOR IP)

## 2025-04-07 PROCEDURE — 82948 REAGENT STRIP/BLOOD GLUCOSE: CPT

## 2025-04-07 PROCEDURE — 2580000003 HC RX 258

## 2025-04-07 PROCEDURE — 1200000000 HC SEMI PRIVATE

## 2025-04-07 PROCEDURE — 6360000002 HC RX W HCPCS: Performed by: NURSE PRACTITIONER

## 2025-04-07 PROCEDURE — 94640 AIRWAY INHALATION TREATMENT: CPT

## 2025-04-07 PROCEDURE — 97530 THERAPEUTIC ACTIVITIES: CPT

## 2025-04-07 PROCEDURE — 97162 PT EVAL MOD COMPLEX 30 MIN: CPT

## 2025-04-07 PROCEDURE — 94761 N-INVAS EAR/PLS OXIMETRY MLT: CPT

## 2025-04-07 PROCEDURE — 99232 SBSQ HOSP IP/OBS MODERATE 35: CPT | Performed by: NURSE PRACTITIONER

## 2025-04-07 PROCEDURE — 97535 SELF CARE MNGMENT TRAINING: CPT

## 2025-04-07 PROCEDURE — 6370000000 HC RX 637 (ALT 250 FOR IP): Performed by: NURSE PRACTITIONER

## 2025-04-07 PROCEDURE — 2500000003 HC RX 250 WO HCPCS

## 2025-04-07 PROCEDURE — 97116 GAIT TRAINING THERAPY: CPT

## 2025-04-07 RX ORDER — CETIRIZINE HYDROCHLORIDE 10 MG/1
10 TABLET ORAL DAILY
Status: DISCONTINUED | OUTPATIENT
Start: 2025-04-07 | End: 2025-04-08 | Stop reason: HOSPADM

## 2025-04-07 RX ORDER — FLUTICASONE PROPIONATE 50 MCG
1 SPRAY, SUSPENSION (ML) NASAL NIGHTLY PRN
Status: DISCONTINUED | OUTPATIENT
Start: 2025-04-07 | End: 2025-04-08 | Stop reason: HOSPADM

## 2025-04-07 RX ADMIN — PANTOPRAZOLE SODIUM 40 MG: 40 TABLET, DELAYED RELEASE ORAL at 15:21

## 2025-04-07 RX ADMIN — CETIRIZINE HYDROCHLORIDE 10 MG: 10 TABLET, FILM COATED ORAL at 10:20

## 2025-04-07 RX ADMIN — ATORVASTATIN CALCIUM 40 MG: 40 TABLET, FILM COATED ORAL at 23:04

## 2025-04-07 RX ADMIN — DIVALPROEX SODIUM 125 MG: 125 CAPSULE ORAL at 23:04

## 2025-04-07 RX ADMIN — CARVEDILOL 3.12 MG: 3.12 TABLET, FILM COATED ORAL at 17:26

## 2025-04-07 RX ADMIN — DIVALPROEX SODIUM 125 MG: 125 CAPSULE ORAL at 08:58

## 2025-04-07 RX ADMIN — DOCUSATE SODIUM 100 MG: 100 CAPSULE, LIQUID FILLED ORAL at 08:57

## 2025-04-07 RX ADMIN — PIPERACILLIN AND TAZOBACTAM 3375 MG: 3; .375 INJECTION, POWDER, FOR SOLUTION INTRAVENOUS at 06:49

## 2025-04-07 RX ADMIN — SODIUM CHLORIDE, PRESERVATIVE FREE 10 ML: 5 INJECTION INTRAVENOUS at 23:05

## 2025-04-07 RX ADMIN — INSULIN LISPRO 2 UNITS: 100 INJECTION, SOLUTION INTRAVENOUS; SUBCUTANEOUS at 17:29

## 2025-04-07 RX ADMIN — TIOTROPIUM BROMIDE INHALATION SPRAY 2 PUFF: 3.12 SPRAY, METERED RESPIRATORY (INHALATION) at 07:57

## 2025-04-07 RX ADMIN — OXYCODONE 5 MG: 5 TABLET ORAL at 21:29

## 2025-04-07 RX ADMIN — ESCITALOPRAM OXALATE 20 MG: 20 TABLET ORAL at 08:58

## 2025-04-07 RX ADMIN — BUPROPION HYDROCHLORIDE 300 MG: 300 TABLET, EXTENDED RELEASE ORAL at 08:58

## 2025-04-07 RX ADMIN — SODIUM CHLORIDE, PRESERVATIVE FREE 10 ML: 5 INJECTION INTRAVENOUS at 09:03

## 2025-04-07 RX ADMIN — LEVOTHYROXINE SODIUM 75 MCG: 0.07 TABLET ORAL at 06:48

## 2025-04-07 RX ADMIN — PANTOPRAZOLE SODIUM 40 MG: 40 TABLET, DELAYED RELEASE ORAL at 06:48

## 2025-04-07 RX ADMIN — HEPARIN SODIUM 5000 UNITS: 5000 INJECTION INTRAVENOUS; SUBCUTANEOUS at 15:20

## 2025-04-07 RX ADMIN — BUDESONIDE AND FORMOTEROL FUMARATE DIHYDRATE 2 PUFF: 80; 4.5 AEROSOL RESPIRATORY (INHALATION) at 19:49

## 2025-04-07 RX ADMIN — OXYCODONE 5 MG: 5 TABLET ORAL at 12:04

## 2025-04-07 RX ADMIN — DICLOFENAC SODIUM 2 G: 10 GEL TOPICAL at 10:21

## 2025-04-07 RX ADMIN — DOCUSATE SODIUM 100 MG: 100 CAPSULE, LIQUID FILLED ORAL at 23:04

## 2025-04-07 RX ADMIN — PIPERACILLIN AND TAZOBACTAM 3375 MG: 3; .375 INJECTION, POWDER, FOR SOLUTION INTRAVENOUS at 15:27

## 2025-04-07 RX ADMIN — CARVEDILOL 3.12 MG: 3.12 TABLET, FILM COATED ORAL at 08:57

## 2025-04-07 RX ADMIN — FENOFIBRATE 160 MG: 160 TABLET ORAL at 08:57

## 2025-04-07 RX ADMIN — AMLODIPINE BESYLATE 10 MG: 10 TABLET ORAL at 08:59

## 2025-04-07 RX ADMIN — FERROUS SULFATE TAB 325 MG (65 MG ELEMENTAL FE) 325 MG: 325 (65 FE) TAB at 08:59

## 2025-04-07 RX ADMIN — ONDANSETRON 4 MG: 2 INJECTION, SOLUTION INTRAMUSCULAR; INTRAVENOUS at 10:24

## 2025-04-07 RX ADMIN — FOLIC ACID 1 MG: 1 TABLET ORAL at 08:58

## 2025-04-07 RX ADMIN — ROPINIROLE HYDROCHLORIDE 0.5 MG: 0.5 TABLET, FILM COATED ORAL at 23:04

## 2025-04-07 RX ADMIN — INSULIN LISPRO 2 UNITS: 100 INJECTION, SOLUTION INTRAVENOUS; SUBCUTANEOUS at 23:05

## 2025-04-07 RX ADMIN — PIPERACILLIN AND TAZOBACTAM 3375 MG: 3; .375 INJECTION, POWDER, FOR SOLUTION INTRAVENOUS at 23:10

## 2025-04-07 RX ADMIN — INSULIN GLARGINE 10 UNITS: 100 INJECTION, SOLUTION SUBCUTANEOUS at 23:04

## 2025-04-07 RX ADMIN — TRAZODONE HYDROCHLORIDE 200 MG: 100 TABLET ORAL at 23:04

## 2025-04-07 RX ADMIN — BUDESONIDE AND FORMOTEROL FUMARATE DIHYDRATE 2 PUFF: 80; 4.5 AEROSOL RESPIRATORY (INHALATION) at 07:57

## 2025-04-07 RX ADMIN — FLUTICASONE PROPIONATE 1 SPRAY: 50 SPRAY, METERED NASAL at 11:56

## 2025-04-07 RX ADMIN — KETOROLAC TROMETHAMINE 15 MG: 30 INJECTION, SOLUTION INTRAMUSCULAR at 08:59

## 2025-04-07 RX ADMIN — INSULIN LISPRO 2 UNITS: 100 INJECTION, SOLUTION INTRAVENOUS; SUBCUTANEOUS at 12:05

## 2025-04-07 ASSESSMENT — PAIN SCALES - GENERAL
PAINLEVEL_OUTOF10: 7
PAINLEVEL_OUTOF10: 9
PAINLEVEL_OUTOF10: 4
PAINLEVEL_OUTOF10: 7

## 2025-04-07 ASSESSMENT — PAIN DESCRIPTION - DESCRIPTORS
DESCRIPTORS: ACHING
DESCRIPTORS: SHARP

## 2025-04-07 ASSESSMENT — PAIN DESCRIPTION - LOCATION
LOCATION: NECK
LOCATION: BACK

## 2025-04-07 ASSESSMENT — PAIN SCALES - WONG BAKER
WONGBAKER_NUMERICALRESPONSE: NO HURT
WONGBAKER_NUMERICALRESPONSE: NO HURT

## 2025-04-07 ASSESSMENT — PAIN DESCRIPTION - ORIENTATION
ORIENTATION: LOWER
ORIENTATION: POSTERIOR

## 2025-04-07 NOTE — PLAN OF CARE
Problem: Chronic Conditions and Co-morbidities  Goal: Patient's chronic conditions and co-morbidity symptoms are monitored and maintained or improved  4/3/2025 1612 by Sergio Biggs RN  Outcome: Progressing  4/3/2025 1531 by Sergio Biggs RN  Outcome: Progressing     Problem: Discharge Planning  Goal: Discharge to home or other facility with appropriate resources  4/3/2025 1612 by Sergio Biggs RN  Outcome: Progressing  4/3/2025 1531 by Sergio Biggs RN  Outcome: Progressing     Problem: Pain  Goal: Verbalizes/displays adequate comfort level or baseline comfort level  4/3/2025 1612 by Sergio Biggs RN  Outcome: Progressing  4/3/2025 1531 by Sergio Biggs RN  Outcome: Progressing     Problem: Safety - Adult  Goal: Free from fall injury  4/3/2025 1612 by Sergio Biggs RN  Outcome: Progressing  4/3/2025 1531 by Sergio Biggs RN  Outcome: Progressing     Problem: Respiratory - Adult  Goal: Clear lung sounds  Description: Clear lung sounds  4/3/2025 0855 by Mercedes Camara RCP  Outcome: Progressing     
  Problem: Chronic Conditions and Co-morbidities  Goal: Patient's chronic conditions and co-morbidity symptoms are monitored and maintained or improved  4/3/2025 1612 by Sergio Biggs, RN  Outcome: Progressing  4/3/2025 1531 by Sergio Biggs, RN  Outcome: Progressing     
  Problem: Chronic Conditions and Co-morbidities  Goal: Patient's chronic conditions and co-morbidity symptoms are monitored and maintained or improved  Outcome: Progressing     Problem: Discharge Planning  Goal: Discharge to home or other facility with appropriate resources  Outcome: Progressing     Problem: Pain  Goal: Verbalizes/displays adequate comfort level or baseline comfort level  Outcome: Progressing     Problem: Safety - Adult  Goal: Free from fall injury  Outcome: Progressing     Problem: Musculoskeletal - Adult  Goal: Return mobility to safest level of function  Outcome: Progressing     Problem: Metabolic/Fluid and Electrolytes - Adult  Goal: Glucose maintained within prescribed range  Outcome: Progressing     
  Problem: Chronic Conditions and Co-morbidities  Goal: Patient's chronic conditions and co-morbidity symptoms are monitored and maintained or improved  Outcome: Progressing     Problem: Discharge Planning  Goal: Discharge to home or other facility with appropriate resources  Outcome: Progressing     Problem: Pain  Goal: Verbalizes/displays adequate comfort level or baseline comfort level  Outcome: Progressing     Problem: Safety - Adult  Goal: Free from fall injury  Outcome: Progressing     Problem: Respiratory - Adult  Goal: Clear lung sounds  Description: Clear lung sounds  4/3/2025 0855 by Mercedes Camara, P  Outcome: Progressing     Problem: Musculoskeletal - Adult  Goal: Return mobility to safest level of function  Outcome: Progressing     
  Problem: Chronic Conditions and Co-morbidities  Goal: Patient's chronic conditions and co-morbidity symptoms are monitored and maintained or improved  Outcome: Progressing     Problem: Discharge Planning  Goal: Discharge to home or other facility with appropriate resources  Outcome: Progressing     Problem: Pain  Goal: Verbalizes/displays adequate comfort level or baseline comfort level  Outcome: Progressing     Problem: Safety - Adult  Goal: Free from fall injury  Outcome: Progressing     Problem: Respiratory - Adult  Goal: Clear lung sounds  Description: Clear lung sounds  4/4/2025 0803 by Nani Smith ABELARDO  Outcome: Progressing  Note: Maintenance  Patient mutually agreed on goals.       Problem: Musculoskeletal - Adult  Goal: Return mobility to safest level of function  4/4/2025 0956 by Sergio Biggs, RN  Outcome: Progressing  4/3/2025 2041 by Ketty Cat, RN  Outcome: Progressing     Problem: Metabolic/Fluid and Electrolytes - Adult  Goal: Glucose maintained within prescribed range  Outcome: Progressing     
  Problem: Chronic Conditions and Co-morbidities  Goal: Patient's chronic conditions and co-morbidity symptoms are monitored and maintained or improved  Outcome: Progressing     Problem: Discharge Planning  Goal: Discharge to home or other facility with appropriate resources  Outcome: Progressing     Problem: Pain  Goal: Verbalizes/displays adequate comfort level or baseline comfort level  Outcome: Progressing     Problem: Safety - Adult  Goal: Free from fall injury  Outcome: Progressing     Problem: Respiratory - Adult  Goal: Clear lung sounds  Description: Clear lung sounds  4/7/2025 0801 by Dilan Welch RCP  Outcome: Progressing     Problem: Musculoskeletal - Adult  Goal: Return mobility to safest level of function  Outcome: Progressing     Problem: Metabolic/Fluid and Electrolytes - Adult  Goal: Glucose maintained within prescribed range  Outcome: Progressing     
  Problem: Pain  Goal: Verbalizes/displays adequate comfort level or baseline comfort level  Outcome: Progressing     
  Problem: Respiratory - Adult  Goal: Clear lung sounds  Description: Clear lung sounds  4/6/2025 2149 by Dilan Dawson RCP  Outcome: Progressing     
  Problem: Respiratory - Adult  Goal: Clear lung sounds  Description: Clear lung sounds  4/7/2025 0801 by Dilan Welch RCP  Outcome: Progressing  4/6/2025 2149 by Dilan Dawson RCP  Outcome: Progressing     
  Problem: Respiratory - Adult  Goal: Clear lung sounds  Description: Clear lung sounds  4/7/2025 1951 by Naomi Alcala RCP  Outcome: Progressing    
  Problem: Respiratory - Adult  Goal: Clear lung sounds  Description: Clear lung sounds  Outcome: Progressing     
  Problem: Respiratory - Adult  Goal: Clear lung sounds  Description: Clear lung sounds  Outcome: Progressing    Continue tx's to improve breath sounds, increase aeration, and decrease WOB. Pt mutually agrees with goals.  
  Problem: Respiratory - Adult  Goal: Clear lung sounds  Description: Clear lung sounds  Outcome: Progressing  Note: Maintenance  Patient mutually agreed on goals.       
  Problem: Respiratory - Adult  Goal: Clear lung sounds  Description: Clear lung sounds  Outcome: Progressing  Note: Maintenance  Patient mutually agreed on goals.       
Consult received see sw note dated 4/1/2025.  
Very unclear what the etiology to her constellation of complaints are. There is a possible abscess on CT imaging. No fevers though, no lekocytosis. Given history of MRSA from prior abscess, added on Vanco. May need to consult ID tomorrow to consider further workup.    Electronically signed by Vinay Estrella MD on 4/1/2025 at 7:29 PM    
ambulating with or without assistive devices  Note: Patient up with standby assist. Encouraging patient to ambulate with staff.      Problem: Metabolic/Fluid and Electrolytes - Adult  Goal: Glucose maintained within prescribed range  Outcome: Progressing  Flowsheets (Taken 4/1/2025 1419)  Glucose maintained within prescribed range:   Administer ordered medications to maintain glucose within target range   Assess for signs and symptoms of hyperglycemia and hypoglycemia  Note: Glucose monitored AC and HS. Insulin as ordered.

## 2025-04-08 ENCOUNTER — APPOINTMENT (OUTPATIENT)
Dept: CT IMAGING | Age: 70
DRG: 863 | End: 2025-04-08
Payer: MEDICARE

## 2025-04-08 VITALS
BODY MASS INDEX: 30.23 KG/M2 | WEIGHT: 177.1 LBS | SYSTOLIC BLOOD PRESSURE: 132 MMHG | HEIGHT: 64 IN | HEART RATE: 58 BPM | RESPIRATION RATE: 16 BRPM | TEMPERATURE: 97.9 F | DIASTOLIC BLOOD PRESSURE: 64 MMHG | OXYGEN SATURATION: 94 %

## 2025-04-08 LAB
GLUCOSE BLD STRIP.AUTO-MCNC: 140 MG/DL (ref 70–108)
GLUCOSE BLD STRIP.AUTO-MCNC: 175 MG/DL (ref 70–108)

## 2025-04-08 PROCEDURE — 2580000003 HC RX 258

## 2025-04-08 PROCEDURE — 82948 REAGENT STRIP/BLOOD GLUCOSE: CPT

## 2025-04-08 PROCEDURE — 74176 CT ABD & PELVIS W/O CONTRAST: CPT

## 2025-04-08 PROCEDURE — 99239 HOSP IP/OBS DSCHRG MGMT >30: CPT | Performed by: NURSE PRACTITIONER

## 2025-04-08 PROCEDURE — 6370000000 HC RX 637 (ALT 250 FOR IP)

## 2025-04-08 PROCEDURE — 6360000002 HC RX W HCPCS

## 2025-04-08 PROCEDURE — 6370000000 HC RX 637 (ALT 250 FOR IP): Performed by: NURSE PRACTITIONER

## 2025-04-08 RX ORDER — ESCITALOPRAM OXALATE 20 MG/1
20 TABLET ORAL DAILY
DISCHARGE
Start: 2025-04-08

## 2025-04-08 RX ORDER — PANTOPRAZOLE SODIUM 40 MG/1
40 TABLET, DELAYED RELEASE ORAL
DISCHARGE
Start: 2025-04-08

## 2025-04-08 RX ORDER — DIVALPROEX SODIUM 125 MG/1
125 TABLET, DELAYED RELEASE ORAL 2 TIMES DAILY
DISCHARGE
Start: 2025-04-08

## 2025-04-08 RX ORDER — PYRIDOXINE HCL (VITAMIN B6) 50 MG
50 TABLET ORAL DAILY
DISCHARGE
Start: 2025-04-08

## 2025-04-08 RX ORDER — DOCUSATE SODIUM 100 MG/1
100 CAPSULE, LIQUID FILLED ORAL 2 TIMES DAILY
DISCHARGE
Start: 2025-04-08

## 2025-04-08 RX ORDER — FENOFIBRATE 160 MG/1
160 TABLET ORAL DAILY
DISCHARGE
Start: 2025-04-08

## 2025-04-08 RX ORDER — AMLODIPINE BESYLATE 10 MG/1
10 TABLET ORAL DAILY
DISCHARGE
Start: 2025-04-08

## 2025-04-08 RX ORDER — CETIRIZINE HYDROCHLORIDE 10 MG/1
10 TABLET ORAL DAILY
DISCHARGE
Start: 2025-04-09

## 2025-04-08 RX ORDER — CEPHALEXIN 500 MG/1
500 CAPSULE ORAL 3 TIMES DAILY
DISCHARGE
Start: 2025-04-08

## 2025-04-08 RX ORDER — ACETAMINOPHEN 160 MG
2000 TABLET,DISINTEGRATING ORAL DAILY
DISCHARGE
Start: 2025-04-08

## 2025-04-08 RX ORDER — POLYETHYLENE GLYCOL 3350 17 G/17G
17 POWDER, FOR SOLUTION ORAL DAILY PRN
DISCHARGE
Start: 2025-04-08

## 2025-04-08 RX ORDER — ATORVASTATIN CALCIUM 40 MG/1
40 TABLET, FILM COATED ORAL NIGHTLY
DISCHARGE
Start: 2025-04-08

## 2025-04-08 RX ORDER — PIOGLITAZONE 15 MG/1
15 TABLET ORAL DAILY
DISCHARGE
Start: 2025-04-08

## 2025-04-08 RX ORDER — BUPROPION HYDROCHLORIDE 150 MG/1
300 TABLET ORAL EVERY MORNING
DISCHARGE
Start: 2025-04-08

## 2025-04-08 RX ORDER — LOPERAMIDE HYDROCHLORIDE 2 MG/1
2 CAPSULE ORAL 4 TIMES DAILY PRN
DISCHARGE
Start: 2025-04-08

## 2025-04-08 RX ORDER — OXYCODONE HYDROCHLORIDE 5 MG/1
5 TABLET ORAL EVERY 4 HOURS PRN
Qty: 20 TABLET | Refills: 0 | Status: SHIPPED | OUTPATIENT
Start: 2025-04-08 | End: 2025-04-14

## 2025-04-08 RX ORDER — LEVOTHYROXINE SODIUM 150 UG/1
150 TABLET ORAL WEEKLY
DISCHARGE
Start: 2025-04-08

## 2025-04-08 RX ORDER — INSULIN LISPRO 100 [IU]/ML
0-10 INJECTION, SOLUTION INTRAVENOUS; SUBCUTANEOUS
DISCHARGE
Start: 2025-04-08

## 2025-04-08 RX ORDER — ROPINIROLE 0.5 MG/1
0.5 TABLET, FILM COATED ORAL NIGHTLY
DISCHARGE
Start: 2025-04-08

## 2025-04-08 RX ORDER — GUAIFENESIN AND DEXTROMETHORPHAN HYDROBROMIDE 10; 100 MG/5ML; MG/5ML
5 SYRUP ORAL EVERY 4 HOURS PRN
DISCHARGE
Start: 2025-04-08

## 2025-04-08 RX ORDER — TRAZODONE HYDROCHLORIDE 100 MG/1
200 TABLET ORAL NIGHTLY
DISCHARGE
Start: 2025-04-08

## 2025-04-08 RX ORDER — CARVEDILOL 3.12 MG/1
3.12 TABLET ORAL 2 TIMES DAILY
DISCHARGE
Start: 2025-04-08

## 2025-04-08 RX ORDER — INSULIN GLARGINE 100 [IU]/ML
10 INJECTION, SOLUTION SUBCUTANEOUS NIGHTLY
DISCHARGE
Start: 2025-04-08

## 2025-04-08 RX ORDER — LATANOPROST 50 UG/ML
1 SOLUTION/ DROPS OPHTHALMIC NIGHTLY
DISCHARGE
Start: 2025-04-08

## 2025-04-08 RX ORDER — NITROGLYCERIN 0.4 MG/1
0.4 TABLET SUBLINGUAL
DISCHARGE
Start: 2025-04-08

## 2025-04-08 RX ORDER — FLUTICASONE PROPIONATE 50 MCG
1 SPRAY, SUSPENSION (ML) NASAL 2 TIMES DAILY PRN
DISCHARGE
Start: 2025-04-08

## 2025-04-08 RX ORDER — FERROUS SULFATE 325(65) MG
325 TABLET ORAL
DISCHARGE
Start: 2025-04-08

## 2025-04-08 RX ORDER — LEVOTHYROXINE SODIUM 75 UG/1
75 TABLET ORAL DAILY
DISCHARGE
Start: 2025-04-08

## 2025-04-08 RX ORDER — ALBUTEROL SULFATE 0.83 MG/ML
2.5 SOLUTION RESPIRATORY (INHALATION) EVERY 6 HOURS PRN
DISCHARGE
Start: 2025-04-08

## 2025-04-08 RX ORDER — FOLIC ACID 1 MG/1
1 TABLET ORAL DAILY
DISCHARGE
Start: 2025-04-08

## 2025-04-08 RX ORDER — ONDANSETRON 4 MG/1
4 TABLET, ORALLY DISINTEGRATING ORAL EVERY 8 HOURS PRN
DISCHARGE
Start: 2025-04-08

## 2025-04-08 RX ADMIN — DOCUSATE SODIUM 100 MG: 100 CAPSULE, LIQUID FILLED ORAL at 09:38

## 2025-04-08 RX ADMIN — LEVOTHYROXINE SODIUM 75 MCG: 0.07 TABLET ORAL at 06:40

## 2025-04-08 RX ADMIN — FENOFIBRATE 160 MG: 160 TABLET ORAL at 09:38

## 2025-04-08 RX ADMIN — PIPERACILLIN AND TAZOBACTAM 3375 MG: 3; .375 INJECTION, POWDER, FOR SOLUTION INTRAVENOUS at 06:46

## 2025-04-08 RX ADMIN — FOLIC ACID 1 MG: 1 TABLET ORAL at 09:38

## 2025-04-08 RX ADMIN — PANTOPRAZOLE SODIUM 40 MG: 40 TABLET, DELAYED RELEASE ORAL at 06:40

## 2025-04-08 RX ADMIN — ESCITALOPRAM OXALATE 20 MG: 20 TABLET ORAL at 09:38

## 2025-04-08 RX ADMIN — CETIRIZINE HYDROCHLORIDE 10 MG: 10 TABLET, FILM COATED ORAL at 09:38

## 2025-04-08 RX ADMIN — OXYCODONE 5 MG: 5 TABLET ORAL at 10:12

## 2025-04-08 RX ADMIN — DICLOFENAC SODIUM 2 G: 10 GEL TOPICAL at 09:37

## 2025-04-08 RX ADMIN — AMLODIPINE BESYLATE 10 MG: 10 TABLET ORAL at 09:38

## 2025-04-08 RX ADMIN — DIVALPROEX SODIUM 125 MG: 125 CAPSULE ORAL at 09:38

## 2025-04-08 RX ADMIN — CARVEDILOL 3.12 MG: 3.12 TABLET, FILM COATED ORAL at 09:38

## 2025-04-08 RX ADMIN — BUPROPION HYDROCHLORIDE 300 MG: 300 TABLET, EXTENDED RELEASE ORAL at 09:38

## 2025-04-08 ASSESSMENT — PAIN DESCRIPTION - ORIENTATION: ORIENTATION: LOWER

## 2025-04-08 ASSESSMENT — PAIN DESCRIPTION - ONSET: ONSET: SUDDEN

## 2025-04-08 ASSESSMENT — PAIN DESCRIPTION - DESCRIPTORS: DESCRIPTORS: ACHING;SORE

## 2025-04-08 ASSESSMENT — PAIN DESCRIPTION - LOCATION: LOCATION: BACK;ABDOMEN

## 2025-04-08 ASSESSMENT — PAIN - FUNCTIONAL ASSESSMENT: PAIN_FUNCTIONAL_ASSESSMENT: PREVENTS OR INTERFERES SOME ACTIVE ACTIVITIES AND ADLS

## 2025-04-08 ASSESSMENT — PAIN SCALES - GENERAL: PAINLEVEL_OUTOF10: 8

## 2025-04-08 ASSESSMENT — PAIN DESCRIPTION - PAIN TYPE: TYPE: ACUTE PAIN

## 2025-04-08 ASSESSMENT — PAIN DESCRIPTION - FREQUENCY: FREQUENCY: INTERMITTENT

## 2025-04-08 NOTE — CARE COORDINATION
4/3/25, 2:50 PM EDT    DISCHARGE PLANNING EVALUATION    Spoke with patient, anticipate discharge tomorrow to return to [facility]. Will schedule ambulette in AM is discharge is confirmed.    Spoke with Elliot at [facility], informed anticipate discharge tomorrow.  
4/3/25, 3:08 PM EDT    DISCHARGE ON GOING EVALUATION    [facility]lli [facility] day: 0  Location: -26/026-A Reason for admit: Retroperitoneal fluid collection [R18.8]  Acute nonintractable headache, unspecified headache type [R51.9]  Acute left-sided low back pain with left-sided sciatica [M54.42]  Low back pain [M54.50]     Procedures: none     Imaging since last note:   4/3 MRI Lumbar: Mild facet degenerative changes in the lumbar spine. 2. 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 3. No evidence of spinal canal stenosis. No acute findings.    Barriers to Discharge: Hospitalist and ID following. PT/OT. SQ heparin. DM management. Roxicodone prn. IV zosyn q8h. IV vancomycin q24h.     PCP: Ryan Montalvo MD  Readmission Risk Score: 30.3    Patient Goals/Plan/Treatment Preferences: Return to [facility]. No precert. Ambulette transport. SW following.     
4/7/25, 3:12 PM EDT    DISCHARGE PLANNING EVALUATION    Spoke with patient, advised possible discharge tomorrow 4/8.  Will arrange an ambulette in the morning.  
4/8/25, 12:44 PM EDT    Patient goals/plan/ treatment preferences discussed by  and .  Patient goals/plan/ treatment preferences reviewed with patient/ family.  Patient/ family verbalize understanding of discharge plan and are in agreement with goal/plan/treatment preferences.  Understanding was demonstrated using the teach back method.  AVS provided by RN at time of discharge, which includes all necessary medical information pertaining to the patients current course of illness, treatment, post-discharge goals of care, and treatment preferences.     Services At/After Discharge: Long Term Care, Aide services, In ambulette, and Nursing service       Patient discharged to return to [facility], long term Medicaid bed.  Spoke with Elliot at [facility], informed of discharge and 1:30 pm transport.  RN aware to fax AVS and MAR and call report.  Spoke with patient, informed of discharge and 1:30 transport.  Left message for patient gamal Laguna, informed of discharge and 1:30 transport.  
Case Management Assessment Initial Evaluation    Date/Time of Evaluation: 4/1/2025 1:55 PM  Assessment Completed by: Justine Markham RN    If patient is discharged prior to next notation, then this note serves as note for discharge by case management.    Patient Name: Elli Coulter                   YOB: 1955  Diagnosis: Retroperitoneal fluid collection [R18.8]  Acute nonintractable headache, unspecified headache type [R51.9]  Acute left-sided low back pain with left-sided sciatica [M54.42]                   Date / Time: 3/31/2025  7:17 PM  Location: [facility]26/026-A     Patient Admission Status: Observation   Readmission Risk Low 0-14, Mod 15-19), High > 20: Readmission Risk Score: 28.2    Current PCP: Ryan Montalvo MD  Health Care Decision Makers:   Secondary Decision Maker (Active): Bailey Trimble - Brother/Sister - [phone]    Additional Case Management Notes: Patient [facility] ED from [facility] with HA, left jaw pain and left lower back pain. Admitted after retroperitoneal fluid collection was revealed. Hx of nephrectomy 11/14/2024, and retroperitoneal drain for the same, removed 2/9/25. IV Vanc and IV Zosyn. Blood cultures pending. Hospitalist and urology. Possible drain if symptoms do not improve.     Procedures: N/A    Imaging:   3/31 CT A/P: Left retroperitoneal fluid collection, possible recurrent abscess     Patient Goals/Plan/Treatment Preferences: From [facility]. Plans to return. See  notes for dc planning.          
RETURNING to current housing: none  Potential Assistance needed at discharge: N/A            Potential DME:    Patient expects to discharge to: Long-term care  Plan for transportation at discharge:  jayesh    Financial  Payor: [facility] MEDICARE / Plan: [employer] DUAL COMPLETE / Product Type: *No Product type* /     Potential assistance Purchasing Medications:  no

## 2025-04-08 NOTE — DISCHARGE SUMMARY
Hospital Medicine Discharge Summary      Patient Identification:   Elli Coulter   : 1955  MRN: [id number]   Account: [id number]      Patient's PCP: Ryan Montalvo MD    Admit Date: 3/31/2025     Discharge Date:   2025    Admitting Physician: BA Goodwin CNP     Discharging Nurse Practitioner: BA Goodwin CNP     Discharge Diagnoses with Assessment/Plan:  Recurrent retroperitoneal fluid collection with Proteus mirabilis (POA) possibly secondary to left nephrectomy on 2024--appreciate urology input~low suspicion for abscess; Zosyn from - and sensitive and will transition to Keflex 500 mg 3 times daily for 10 days from  per ID, vancomycin from -; afebrile, no increase in white count; appreciate infectious disease input~he feels the swelling appears to be much better than her last hospitalization; on exam the left side of her low back is tender with some swelling; had CT-guided drain placement per IR on  and removed on  as repeat CT scan showed the left peritoneal fluid collection has diminished measuring up to 7 mm in thickness and previously was 23 mm; oxycodone for severe pain  Possible left-sided sciatica pain--questioning if secondary to #1, MRI lumbar spine without contrast showed mild facet degenerative changes in the lumbar spine, no evidence of spinal canal stenosis and no acute finding; urine without any infection on 3/31; encourage patient to increase her activity; needs PT/OT  Intractable headache--resolved  Chronic diastolic heart failure--on Coreg  Diabetes mellitus type 2, uncontrolled--Lantus; medium dose sliding scale, stable  Primary hypertension, uncontrolled--on Norvasc, Coreg; stable  Hypothyroidism--treated with Synthroid  COPD--stable, on room air; on Symbicort/Spiriva  RLS--on Requip  Anxiety/depression--treated  History simple left nephrectomy on 2024--retroperitoneal drain was placed on

## 2025-04-08 NOTE — PROGRESS NOTES
Progress note: Infectious diseases    Patient - Elli Coulter,  Age - 69 y.o.    - 1955      Room Number - 8B-26/026-A   MRN -  [id number]   [facility] # - [id number]  Date of Admission -  3/31/2025  7:17 PM    SUBJECTIVE:   She has no new complaints.  There is no drainage from the [place]car[place].  CT scan without contrast was done shows improvement of the collection.  Denies any fever or chills.  OBJECTIVE   VITALS    height is 1.626 m (5' 4\") and weight is 80.3 kg (177 lb 1.6 oz). Her oral temperature is 97.9 °F (36.6 °C). Her blood pressure is 132/64 and her pulse is 58. Her respiration is 16 and oxygen saturation is 94%.       Wt Readings from Last 3 Encounters:   25 80.3 kg (177 lb 1.6 oz)   25 79.8 kg (176 lb)   25 78.8 kg (173 lb 12.8 oz)       I/O (24 Hours)    Intake/Output Summary (Last 24 hours) at 2025 1222  Last data filed at 2025 0930  Gross per 24 hour   Intake 350 ml   Output 0 ml   Net 350 ml       General Appearance  Awake, alert, oriented,     HEENT - normocephalic, atraumatic, pale  conjunctiva,  anicteric sclera  Neck - Supple, no mass  Lungs -  Bilateral   air entry,    Cardiovascular - Heart sounds are normal.     Abdomen - soft, not distended, nontender, drain over the left flank,   Neurologic -oriented.  Skin - No bruising or bleeding  Extremities - No edema, no cyanosis, clubbing   Drain on the left flank area  MEDICATIONS:      cetirizine  10 mg Oral Daily    diclofenac sodium  2 g Topical BID    piperacillin-tazobactam  3,375 mg IntraVENous Q8H    insulin lispro  0-8 Units SubCUTAneous 4x Daily AC & HS    amLODIPine  10 mg Oral Daily    atorvastatin  40 mg Oral Nightly    buPROPion  300 mg Oral QAM    carvedilol  3.125 mg Oral BID WC    divalproex  125 mg Oral BID    docusate sodium  100 mg Oral BID    escitalopram  20 mg Oral Daily    fenofibrate  160 mg Oral Daily    
              Hospitalist Progress Note    Patient:  Elli Coulter      Unit/Bed:8B-26/026-A    YOB: 1955    MRN: [id number]       Acct: [id number]     PCP: Ryan Montalvo MD    Date of Admission: 3/31/2025    Assessment/Plan:    Recurrent retroperitoneal fluid collection with gram-negative bacilli possibly secondary to left nephrectomy on November 14, 2024--appreciate urology input~low suspicion for abscess; Zosyn from 4/1, vancomycin from 4/1-4/5; afebrile, no increase in white count; appreciate infectious disease input~he feels the swelling appears to be much better than her last hospitalization; on exam the left side of her low back is tender with some swelling; had CT-guided drain placement per IR on 4/4 and preliminary cultures showing gram-negative bacilli;  Toradol 15 mg every 6 hours as needed moderate/severe pain~use 2 doses in 24 hours, is on oxycodone via moderate pain protocol and used 10 mg in 24 hours  Possible left-sided sciatica pain--questioning if secondary to #1, MRI lumbar spine without contrast showed mild facet degenerative changes in the lumbar spine, no evidence of spinal canal stenosis and no acute finding; urine without any infection on 3/31; encourage patient to increase her activity  Intractable headache--resolved  Chronic diastolic heart failure--on Coreg  Diabetes mellitus type 2, uncontrolled--Lantus; medium dose sliding scale, monitor  Primary hypertension, uncontrolled--on Norvasc, Coreg; monitor  Hypothyroidism--treated with Synthroid  COPD--stable, on room air; on Symbicort/Spiriva  RLS--on Requip  Anxiety/depression--treated  History simple left nephrectomy on November 14, 2024--retroperitoneal drain was placed on January 6, 2025 and removed on February 9, 2025  Physical deconditioning/debility--secondary to numerous comorbid medical conditions, patient is from assisted living, per OT note from 4/4 return to assisted living and resume OT services; awaiting PT 
              Hospitalist Progress Note    Patient:  Elli Coulter      Unit/Bed:8B-26/026-A    YOB: 1955    MRN: [id number]       Acct: [id number]     PCP: Ryan Montalvo MD    Date of Admission: 3/31/2025    Assessment/Plan:    Recurrent retroperitoneal fluid collection--appreciate urology input~low suspicion for abscess; Zosyn from 4/1, vancomycin from 4/1; afebrile, no increase in white count; will involve infectious disease, questioning if IR needs to place a drain  Possible left-sided sciatica pain--questioning if secondary to #1, may need dedicated lumbar imaging  Intractable headache--resolved  Chronic diastolic heart failure  Diabetes mellitus type 2, uncontrolled  Primary hypertension, uncontrolled--on Norvasc, Coreg; monitor  Hypothyroidism--treated with Synthroid  COPD--stable, on room air; on Symbicort/Spiriva  RLS--on Requip  Anxiety/depression--treated  History simple left nephrectomy on November 14, 2024--retroperitoneal drain was placed on January 6, 2025 and removed on February 9, 2025      Expected discharge date: Pending clinical course    Disposition:    [x] Home       [] TCU       [] Rehab       [] Psych       [] SNF       [] Long Term Care Facility       [] Other-    Chief Complaint: Per H&P done April 1, 2025: \"The patient is a 69 y.o. female who presents with complaints initially of ongoing headache and left lower back pain along with pain in her left jaw.  Patient's primary complaint was related to headache which she states has been going on for 3 to 4 weeks.  States that she had seen her PCP about the headache and he had discussed potentially getting a CT scan of her head.  Patient was given droperidol in ER and patient reported improvement in her headache.  In regards to the left jaw pain, patient was noted to have ulceration where her dentures sit in her mouth and was suspected that this was contributing.  She did have a CT soft tissue neck which showed no significant 
  Pharmacy Note - Extended Infusion Beta-Lactam Dose Adjustment    Piperacillin/Tazobactam 3375 mg q8h extended infusion ordered for the treatment of Intra-abdominal Infection. Per [facility] Extended Infusion Beta-Lactam Policy, this will be changed to 4500 mg q8h extended infusion for BMI > 40    Estimated Creatinine Clearance: Estimated Creatinine Clearance: 79 mL/min (based on SCr of 0.9 mg/dL).    BMI: Body mass index is 47 kg/m².    Rationale for Adjustment: Dose adjusted per [facility] Extended Infusion Policy based on renal function and indication. The above medication is renally eliminated and demonstrates time-dependent effects on bacterial eradication. Extended-infusion dosing strategy aims to enhance microbiologic and clinical efficacy.     Pharmacy will monitor renal function daily and adjust dose as necessary.    Please call with any questions.    Thank you,    Destin Foley, PharmD, BCPS  4/7/2025  10:07 AM      
1441 Patient received in CT scanning for pre-procedure drain insertion assessment. Monitor applied.  Dr. Pablo here; spoke to patient. This procedure has been fully reviewed with the patient and written informed consent has been obtained.   1451 Patient assisted to CT table and pre scan started.   1456 Procedure started with Dr. Pablo.  1500 Samples collected and sent to lab for further review.  Procedure completed; patient tolerated well. Post scan obtained.   1508 Patient on bed; comfort ensured.  1510 Report called to 8B and patient taken to bed via bed with family at bedside. Pt alert and oriented x3; follows commands. Skin pink, warm, and dry. Respirations easy, regular, and nonlabored.    
Changed vancomycin loading dose to 25mg/kg (2000mg) per [facility] policy.  Minnie Mora [facility], BCPS, [facility] [place]  3/15/2025     11:31 PM    
[facility]  STRZ MED SURG 8AB  Occupational Therapy  Daily Note    Discharge Recommendations: ECF with OT  Equipment Recommendations: No        Time In: 0958  Time Out: 1021  Timed Code Treatment Minutes: 23 Minutes  Minutes: 23          Date: 2025  Patient Name: Elli Coulter,   Gender: female      Room: 8B-26/026-A  MRN: [id number]  : 1955  (69 y.o.)  Referring Practitioner: Candace Jimenez APRN - CNP  Diagnosis: Retroperitoneal fluid collection  Additional Pertinent Hx: Per EMR: ER note:  69 y.o. female with PMH of COPD, substance abuse, EtOH use, bipolar disorder presents to the emergency department for evaluation of headache, left lower back pain with sciatic pain shooting down the left lower leg, left mouth pain and left sided tinnitus.  Patient stated symptoms have been going on for the past 3 to 4 weeks.  CT guided drain placement on 4/3    Restrictions/Precautions:  Position Activity Restriction  Other Position/Activity Restrictions: Drain     Social/Functional History:  Lives With: Other (Comment) (ECF)  Type of Home: Assisted living  Home Layout: One level  Home Access: Level entry  Home Equipment: Walker - Rolling   Bathroom Shower/Tub: Walk-in shower  Bathroom Toilet: Standard  Bathroom Equipment: Grab bars in shower, Shower chair    Receives Help From:  (facility staff)  Prior Level of Assist for ADLs: Needs assistance (pt reports being ind but per chart review, pt needs intermittent assist from [facility] staff.)  Prior Level of Assist for Homemaking: Needs assistance  Homemaking Responsibilities: No  Ambulation Assistance: Independent  Prior Level of Assist for Transfers: Independent          Occupation: On disability  Additional Comments: Pt reports not using AD within [facility] room and furniture/wall walking. Is pushed by [facility] staff in w/c to dining brito. Reports only walking longer distances when working with therapy. [facility] also manages cleaning, laundry, meals, and 
Discharge teaching and instructions for diagnosis/procedure of retroperitoneal fluid collection completed with patient using teachback method. AVS reviewed. Printed prescriptions given to patient. Patient voiced understanding regarding prescriptions, follow up appointments, and care of self at home. Discharged via ambulance to  skilled nursing per EMS transportation. Report called to MANUEL Hartman at [facility].     
[facility]   Pharmacy Pharmacokinetic Monitoring Service - Vancomycin    Indication: IAI  Target Concentration: Goal AUC/FELICIA 400-600 mg*hr/L  Day of Therapy: 2  Additional Antimicrobials: zosyn    Pertinent Laboratory Values:   Wt Readings from Last 1 Encounters:   03/31/25 77.1 kg (170 lb)     Temp Readings from Last 1 Encounters:   04/02/25 97.9 °F (36.6 °C) (Oral)     Estimated Creatinine Clearance: 55 mL/min (A) (based on SCr of 1 mg/dL (H)).  Recent Labs     03/31/25  2100 04/02/25  0605   CREATININE 0.9 1.0*   BUN 17 17   WBC 8.1 5.7       Recent vancomycin administrations                     vancomycin (VANCOCIN) 1500 mg in sodium chloride 0.9 % 250 mL IVPB (mg) 1,500 mg New Bag 04/02/25 0351    vancomycin (VANCOCIN) 2000 mg in 0.9% sodium chloride 500 mL IVPB (mg) 2,000 mg New Bag 04/01/25 0303                  Assessment:  Date/Time Current Dose Concentration Timing of Concentration AUC C trough,ss   4/2/25 @ 1151 1500mg q24h 14.4 ~ 8 hr 467 14.7   Note: Serum concentrations collected for AUC dosing may appear elevated if collected in close proximity to the dose administered, this is not necessarily an indication of toxicity    Plan:  Current dosing regimen is therapeutic  Continue current dose of Vancomycin 1500mg q24h  Pharmacy will monitor renal function and adjust therapy as indicated.    Thank you for the consult,  Julissa Quiroz R.Ph., BCPS., 4/2/2025,1:43 PM     
[facility]   Pharmacy Pharmacokinetic Monitoring Service - Vancomycin     Elli Coulter is a 69 y.o. female starting on vancomycin therapy for IAI. Pharmacy consulted by Dr. Estrella for monitoring and adjustment.    Target Concentration: Goal AUC/FELICIA 400-600 mg*hr/L    Additional Antimicrobials: Zosyn    Pertinent Laboratory Values:   Wt Readings from Last 1 Encounters:   03/31/25 77.1 kg (170 lb)     Temp Readings from Last 1 Encounters:   04/01/25 98.1 °F (36.7 °C) (Oral)     Estimated Creatinine Clearance: 61 mL/min (based on SCr of 0.9 mg/dL).  Recent Labs     03/31/25  2100   CREATININE 0.9   BUN 17   WBC 8.1     Pertinent Cultures:  Date Source Results   2/8/25 Drainage at nephrostomy site  MRSA, Proteus mirabilis   4/1/25 Blood x2 sent   MRSA Nasal Swab: N/A. Non-respiratory infection.    Plan:  Dosing recommendations based on Bayesian software  Patient received vancomycin 2000 mg on 4/1/25 at 0303. Begin vancomycin 1500 mg IV q24h for anticipated AUC of 492 and trough concentration of 13.8 at steady state  Renal labs as indicated   Vancomycin concentration ordered for tomorrow at 1200  Pharmacy will monitor renal function and adjust therapy as indicated    Thank you for the consult,  Chantale Redmond, PharmD  4/1/2025 at 11:58 AM   
[facility]   Pharmacy Pharmacokinetic Monitoring Service - Vancomycin    Indication: IAI  Target Concentration: Goal AUC/FELICIA 400-600 mg*hr/L  Day of Therapy: 2  Additional Antimicrobials: zosyn    Pertinent Laboratory Values:   Wt Readings from Last 1 Encounters:   03/31/25 77.1 kg (170 lb)     Temp Readings from Last 1 Encounters:   04/02/25 97.9 °F (36.6 °C) (Oral)     Estimated Creatinine Clearance: 55 mL/min (A) (based on SCr of 1 mg/dL (H)).  Recent Labs     03/31/25  2100 04/02/25  0605   CREATININE 0.9 1.0*   BUN 17 17   WBC 8.1 5.7       Recent vancomycin administrations                     vancomycin (VANCOCIN) 1500 mg in sodium chloride 0.9 % 250 mL IVPB (mg) 1,500 mg New Bag 04/02/25 0351    vancomycin (VANCOCIN) 2000 mg in 0.9% sodium chloride 500 mL IVPB (mg) 2,000 mg New Bag 04/01/25 0303                  Assessment:  Date/Time Current Dose Concentration Timing of Concentration AUC C trough,ss   4/2/25 @ 1151 1500mg q24h 14.4 ~ 8 hr 467 14.7   Note: Serum concentrations collected for AUC dosing may appear elevated if collected in close proximity to the dose administered, this is not necessarily an indication of toxicity    Plan:  Current dosing regimen is therapeutic  Continue current dose of Vancomycin 1500mg q24h  Pharmacy will monitor renal function and adjust therapy as indicated.    Thank you for the consult,  Julissa Quiroz R.Ph., BCPS., 4/2/2025,1:43 PM     
[facility]  INPATIENT OCCUPATIONAL THERAPY  STRZ MED SURG 8AB  EVALUATION      Discharge Recommendations: Patient would benefit from continued therapy after discharge (return to [facility] with resumption of OT services)  Equipment Recommendations: No        Time In: 0938  Time Out: 957  Timed Code Treatment Minutes: 10 Minutes  Minutes: 19          Date: 2025  Patient Name: Elli Coulter,   Gender: female      MRN: [id number]  : 1955  (69 y.o.)  Referring Practitioner: Candace Jimenez APRN - CNP  Diagnosis: Retroperitoneal fluid collection  Additional Pertinent Hx: Per EMR: ER note:  69 y.o. female with PMH of COPD, substance abuse, EtOH use, bipolar disorder presents to the emergency department for evaluation of headache, left lower back pain with sciatic pain shooting down the left lower leg, left mouth pain and left sided tinnitus.  Patient stated symptoms have been going on for the past 3 to 4 weeks.  CT guided drain placement on 4/3    Restrictions/Precautions:  Restrictions/Precautions: Fall Risk  Position Activity Restriction  Other Position/Activity Restrictions: Drain    Subjective  Chart Reviewed: Yes, Orders, Progress Notes, History and Physical  Patient assessed for rehabilitation services?: Yes         Pain: did not rate, groaning with back pain throughout session    Vitals: Vitals not assessed per clinical judgement, see nursing flowsheet    Social/Functional History:  Lives With: Other (Comment) (ECF)  Type of Home: Assisted living  Home Layout: One level  Home Access: Level entry  Home Equipment: Walker - Rolling   Bathroom Shower/Tub: Walk-in shower  Bathroom Toilet: Standard  Bathroom Equipment: Grab bars in shower, Shower chair    Receives Help From:  (facility staff)  Prior Level of Assist for ADLs: Needs assistance (pt reports being ind but per chart review, pt needs intermittent assist from [facility] staff.)  Prior Level of Assist for Homemaking: Needs 
[facility]   PROGRESS NOTE      Patient: Elli Coulter  Room #: 8B-26/026-A            YOB: 1955  Age: 69 y.o.  Gender: female            Admit Date & Time: 3/31/2025  7:17 PM    Assessment:    The patient declined a visit today.    Interventions:  The patient was provided information about Spiritual Care being available.     Outcomes:  The  wished the patient a positive day.     Plan:  1.Spiritual care will continue to follow the patient according to [facility] spiritual care SOP.       Electronically signed by Chaplain Abdon, on 4/3/2025 at 9:57 AM.  Spiritual Care Department  [facility]  [phone]    
[facility]  OCCUPATIONAL THERAPY MISSED TREATMENT NOTE  STRZ MED SURG 8AB  8B-26/026-A      Date: 2025  Patient Name: Elli Coulter        CSN: [id number]   : 1955  (69 y.o.)  Gender: female                REASON FOR MISSED TREATMENT: Pt declined OT eval this AM. Despite encouragement and education re importance of OOB activity, pt continued to decline and reported \"not now.\" Will follow up as appropriate. Thank you.                
clinical course, hopefully 4/7    Disposition:    [x] Home       [] TCU       [] Rehab       [] Psych       [] SNF       [] Long Term Care Facility       [] Other-    Chief Complaint: Back pain    Hospital Course: Per H&P done April 1, 2025: \"The patient is a 69 y.o. female who presents with complaints initially of ongoing headache and left lower back pain along with pain in her left jaw.  Patient's primary complaint was related to headache which she states has been going on for 3 to 4 weeks.  States that she had seen her PCP about the headache and he had discussed potentially getting a CT scan of her head.  Patient was given droperidol in ER and patient reported improvement in her headache.  In regards to the left jaw pain, patient was noted to have ulceration where her dentures sit in her mouth and was suspected that this was contributing.  She did have a CT soft tissue neck which showed no significant abnormalities.  Due to some left flank pain and prior history of recurrent perinephric abscess, CT abdomen pelvis was obtained which showed concern for left retroperitoneal fluid collection and possible recurrent abscess.  Urology and hospitalist were contacted.  Upon my exam, patient is resting in bed.  She does report improvement in her headache at this time but still states that it is rated as a 7-8/10.  States headache is diffuse across her entire head, at times has photophobia and phonophobia.  Denies any vision changes at this time.  States that she has a history of migraines over 20 years ago.  In regards to left flank and abdominal pain she denies left lower quadrant abdominal pain but does note some tenderness on her left flank and left lower back with palpation.  Denies any fevers.\"    4/2--> hemodynamically stable, remains afebrile, on room air; urology saw yesterday and note reviewed; will involve infectious disease, questioning if we need IR to place a drain     4/3--> hemodynamically stable, afebrile 
mcg Oral Weekly    levothyroxine  75 mcg Oral Once per day on Monday Tuesday Wednesday Thursday Friday Saturday    pantoprazole  40 mg Oral BID AC    rOPINIRole  0.5 mg Oral Nightly    traZODone  200 mg Oral Nightly    sodium chloride flush  5-40 mL IntraVENous 2 times per day    piperacillin-tazobactam  3,375 mg IntraVENous Q8H    heparin (porcine)  5,000 Units SubCUTAneous 3 times per day      dextrose      sodium chloride       ketorolac, glucose, dextrose bolus **OR** dextrose bolus, glucagon (rDNA), dextrose, guaiFENesin-dextromethorphan, oxyCODONE **OR** oxyCODONE, fluticasone, sodium chloride flush, sodium chloride, potassium chloride **OR** potassium alternative oral replacement **OR** potassium chloride, magnesium sulfate, ondansetron **OR** ondansetron, polyethylene glycol, acetaminophen **OR** acetaminophen      LABS:     CBC:   Recent Labs     04/06/25  0605   WBC 5.6   HGB 11.5*          BMP:    Recent Labs     04/06/25  0606      K 4.3      CO2 25   BUN 20   CREATININE 0.9   GLUCOSE 183*       Calcium:  Recent Labs     04/06/25  0606   CALCIUM 10.7*       Ionized Calcium:Invalid input(s): \"IONCA\"  Magnesium:  No results for input(s): \"MG\" in the last 72 hours.    Phosphorus:No results for input(s): \"PHOS\" in the last 72 hours.  BNP:No results for input(s): \"BNP\" in the last 72 hours.  Glucose:  Recent Labs     04/05/25  1732 04/05/25  1948 04/06/25  0833   POCGLU 231* 285* 151*     HgbA1C: No results for input(s): \"LABA1C\" in the last 72 hours.  INR: No results for input(s): \"INR\" in the last 72 hours.  Hepatic:   No results for input(s): \"ALKPHOS\", \"ALT\", \"AST\", \"BILITOT\", \"BILIDIR\", \"LABALBU\" in the last 72 hours.    Invalid input(s): \"PROT\"     CULTURES:   UA:   Recent Labs     04/04/25  1050   PHUR 6.0   COLORU STRAW   PROTEINU NEGATIVE   BLOODU NEGATIVE   RBCUA 0-2   WBCUA 5-9   BACTERIA NONE SEEN   NITRU NEGATIVE   GLUCOSEU 500*   BILIRUBINUR NEGATIVE   UROBILINOGEN 0.2   KETUA 
fenofibrate  160 mg Oral Daily    ferrous sulfate  325 mg Oral Q3 Days    budesonide-formoterol  2 puff Inhalation BID RT    And    tiotropium  2 puff Inhalation Daily RT    [Held by provider] insulin glargine  10 Units SubCUTAneous Nightly    folic acid  1 mg Oral Daily    insulin lispro  0-4 Units SubCUTAneous 4x Daily AC & HS    [START ON 4/6/2025] levothyroxine  150 mcg Oral Weekly    levothyroxine  75 mcg Oral Once per day on Monday Tuesday Wednesday Thursday Friday Saturday    pantoprazole  40 mg Oral BID AC    rOPINIRole  0.5 mg Oral Nightly    traZODone  200 mg Oral Nightly    sodium chloride flush  5-40 mL IntraVENous 2 times per day    piperacillin-tazobactam  3,375 mg IntraVENous Q8H    heparin (porcine)  5,000 Units SubCUTAneous 3 times per day    vancomycin (VANCOCIN) intermittent dosing (placeholder)   Other RX Placeholder    vancomycin  1,500 mg IntraVENous Q24H     PRN Meds: oxyCODONE **OR** oxyCODONE, fluticasone, glucose, dextrose bolus **OR** dextrose bolus, glucagon (rDNA), dextrose, sodium chloride flush, sodium chloride, potassium chloride **OR** potassium alternative oral replacement **OR** potassium chloride, magnesium sulfate, ondansetron **OR** ondansetron, polyethylene glycol, acetaminophen **OR** acetaminophen      Intake/Output Summary (Last 24 hours) at 4/3/2025 0817  Last data filed at 4/2/2025 1946  Gross per 24 hour   Intake 220 ml   Output --   Net 220 ml       Diet:  ADULT DIET; Regular; 5 carb choices (75 gm/meal)    Exam:  BP (!) 169/74   Pulse 58   Temp 98 °F (36.7 °C)   Resp 16   Ht 1.626 m (5' 4\")   Wt 77.1 kg (170 lb)   SpO2 92%   BMI 29.18 kg/m²     General appearance: No apparent distress, appears stated age and cooperative.  HEENT: Pupils equal, round, and reactive to light. Conjunctivae/corneas clear.  Neck: Supple, with full range of motion. No jugular venous distention. Trachea midline.  Respiratory:  Normal respiratory effort. Clear to auscultation, bilaterally 
K59.00    Vitamin deficiency E56.9    Lower abdominal pain R10.30    Renal abscess N15.1    Leukocytosis D72.829    Shortness of breath R06.02    Cellulitis L03.90    Cellulitis and abscess of trunk L03.319, L02.219    CKD (chronic kidney disease), stage II N18.2    Acute left-sided low back pain with left-sided sciatica M54.42    History of left nephrectomy Z90.5    Low back pain M54.50         ASSESSMENT/PLAN   Lower back pain radiating to the left leg :  MRI mild degenerative disease,  Hx of left nephrectomy for recurrent abscess /infection: a drain placed on the fluid. Minimal drainage. Gram stain so far negative. Will stop vancomycin  If remains negative by tomorrow will stop antibiotics      Nikos Virk MD, 4/5/2025 9:15 AM   
K59.00    Vitamin deficiency E56.9    Lower abdominal pain R10.30    Renal abscess N15.1    Leukocytosis D72.829    Shortness of breath R06.02    Cellulitis L03.90    Cellulitis and abscess of trunk L03.319, L02.219    CKD (chronic kidney disease), stage II N18.2    Acute left-sided low back pain with left-sided sciatica M54.42    History of left nephrectomy Z90.5    Low back pain M54.50         ASSESSMENT/PLAN   Lower back pain radiating to the left leg :  MRI mild degenerative disease,  Hx of left nephrectomy for recurrent abscess /infection: a drain placed on the fluid. Minimal drainage. Will follow the result   Will follow the gram stain and cx      Nikos Virk MD, 4/4/2025 4:35 PM   
Safety education & training  General Plan:  (3-5X GM)    Goals:  Patient Goals : agreeable for PT  Short Term Goals  Time Frame for Short Term Goals: by discharge  Short Term Goal 1: bed mobility with S to get in/out of bed  Short Term Goal 2: transfer with S to get in/out of chairs  Short Term Goal 3: amb >25'x1 with LRAD or no AD and S to walk safely in room  Long Term Goals  Time Frame for Long Term Goals : no LTGs set secondary to short ELOS    Following session, patient left in safe position with all fall risk precautions in place.      
abscess N15.1    Leukocytosis D72.829    Shortness of breath R06.02    Cellulitis L03.90    Cellulitis and abscess of trunk L03.319, L02.219    CKD (chronic kidney disease), stage II N18.2    Acute left-sided low back pain with left-sided sciatica M54.42    History of left nephrectomy Z90.5    Low back pain M54.50         ASSESSMENT/PLAN   Lower back pain radiating to the left leg : plan for MRI  Hx of left nephrectomy for recurrent abscess /infection  Will follow MRI report      Nikos Virk MD, 4/3/2025 11:38 AM   
disease,  Hx of left nephrectomy for recurrent abscess /infection: a drain placed on the fluid. Minimal drainage.   Gram stain +ve for gram negative bacilli not yet identified  Continue current treatment. Will plan discharge in 1-2 days    Nikos Virk MD, 4/7/2025 9:49 AM   
reasonably achievable.    CT SOFT TISSUE NECK W CONTRAST  Result Date: 3/31/2025  CT soft tissue neck with contrast Comparison: None Findings: Tonsils, adenoids and epiglottis are within normal limits. There is no prevertebral soft tissue swelling or fluid. Bilateral parotid and submandibular glands unremarkable. No significant adenopathy in the neck by size criteria. Airway midline without deviation or displacement. No foreign bodies are demonstrated. Visualized upper lungs and sinuses are clear. No acute bony abnormalities demonstrated. Mandible and TMJs are unremarkable. Bilateral mastoids and middle ears are clear.     Impression: No significant abnormalities. This document has been electronically signed by: Anil Sky MD on 03/31/2025 10:42 PM All CTs at this facility use dose modulation techniques and iterative reconstructions, and/or weight-based dosing when appropriate to reduce radiation to a low as reasonably achievable.      Code Status: Full Code    Tele:   [] Yes              [x] no    LDA: []CVC / []PICC / []Midline / []Prater / []Drains / []Mediport / [x]None  Antibiotics: Zosyn  Steroids: None  Labs (still needed?): [x]Yes / []No  IVF (still needed?): []Yes / [x]No    Level of care: []Step Down / [x]Med-Surg  Bed Status: [x]Inpatient / []Observation  PT/OT: [x]Yes / []No    DVT Prophylaxis: [] Lovenox / [x] Heparin subcu/ [] SCDs / [] Already on Systemic Anticoagulation / [] None       Active Hospital Problems    Diagnosis Date Noted    Low back pain [M54.50] 04/03/2025    Acute left-sided low back pain with left-sided sciatica [M54.42] 04/01/2025    History of left nephrectomy [Z90.5] 04/01/2025    Retroperitoneal fluid collection [R18.8] 03/19/2024       Electronically signed by BA Goodwin CNP on 4/5/2025 at 6:51 AM    
weight-based dosing when appropriate to reduce radiation to a low as reasonably achievable.      Code Status: Full Code    Tele:   [] Yes              [x] no    LDA: []CVC / []PICC / []Midline / []Prater / []Drains / []Mediport / [x]None  Antibiotics: Zosyn/vancomycin  Steroids: None  Labs (still needed?): []Yes / [x]No  IVF (still needed?): []Yes / [x]No    Level of care: []Step Down / [x]Med-Surg  Bed Status: [x]Inpatient / []Observation  PT/OT: [x]Yes / []No    DVT Prophylaxis: [] Lovenox / [x] Heparin subcu/ [] SCDs / [] Already on Systemic Anticoagulation / [] None       Active Hospital Problems    Diagnosis Date Noted    Low back pain [M54.50] 04/03/2025    Acute left-sided low back pain with left-sided sciatica [M54.42] 04/01/2025    History of left nephrectomy [Z90.5] 04/01/2025    Retroperitoneal fluid collection [R18.8] 03/19/2024       Electronically signed by BA Goodwin CNP on 4/4/2025 at 6:42 AM

## 2025-04-14 LAB
BUN BLDV-MCNC: 24 MG/DL
CALCIUM SERPL-MCNC: 10.4 MG/DL
CHLORIDE BLD-SCNC: 97 MMOL/L
CO2: 23 MMOL/L
CREAT SERPL-MCNC: 1 MG/DL
EGFR: 60
GLUCOSE BLD-MCNC: 238 MG/DL
POTASSIUM SERPL-SCNC: 4.9 MMOL/L
SODIUM BLD-SCNC: 133 MMOL/L

## 2025-04-28 NOTE — PROGRESS NOTES
Hospitalist Progress Note    Patient:  Liz Morrell      Unit/Bed:8A-06/006-A    YOB: 1955    MRN: [id number]       Acct: [de-identified]     PCP: BA Jeter CNP    Date of Admission: 11/4/2022    Assessment/Plan:    Acute COPD exacerbation--on Proventil nebulizers twice a day, Spiriva; received Solu-Medrol 125 mg on 11/4 and started on prednisone 40 mg daily from 11/5; oxygen weaned off, patient states she quit smoking 4 days ago; improved lung aeration today  Possible bilateral pneumonia (POA), likely secondary to gram-negative bacilli--Zithromax 11/4, Rocephin from 11/5; has incentive spirometry and Acapella  Elevated troponin--trended down to normal, likely demand mismatch  Acute hyperglycemia--likely secondary to steroids, hemoglobin A1c noted to be 6.5, change to high dose Humalog sliding scale along with hypoglycemia protocol, glucose down to 197 today, will need outpatient follow-up, monitor  Leukocytosis--resulted in normal  Essential hypertension, uncontrolled--Coreg, monitor  Chronic diastolic heart failure--on Coreg  Hypothyroidism--treated with Synthroid  Tobacco abuse--cessation counseling; NicoDerm 14 mg topically daily  History of urinary overflow incontinence      Expected discharge date: Approximately 1 days    Disposition:    [x] Home       [] TCU       [] Rehab       [] Psych       [] SNF       [] [place]       [] Other-    Chief Complaint: Shortness of breath    Hospital Course: Per H&P done 11/4/2022: Susana Clark is a 73 y/o  female with a PMHx of COPD, urinary incontinence who presents to [facility] ED today for the evaluation of shortness of breath. Pt states her symptoms started about 4 days ago, and has progressively worsened. She endorses associated body aches, headache, fatigue, lightheadedness, decreased appetite and PO intake x 24 hours, and productive cough.  She states she did not take any of her medications yesterday, and has not taken anything to treat her symptoms. Pt  denies fever, chills, chest pain, abdominal pain, dizziness, changes in bowel or urinary habits. \"    11/5--> hemodynamically stable, satting 95% on 3 L of oxygen; sugar was quite high so hemoglobin A1c was checked and noted to be 6.5    11/6--> hemodynamically stable, on room air, much improved aeration today on exam; glucose better on high-dose sliding scale     Subjective (past 24 hours): States she is tired and that her chest hurts with deep inspiration from coughing, she is eating, she appears much more comfortable today    Medications:  Reviewed    Infusion Medications    dextrose      sodium chloride       Scheduled Medications    insulin lispro  0-8 Units SubCUTAneous TID WC    insulin lispro  0-4 Units SubCUTAneous Nightly    azithromycin  250 mg Oral Daily    albuterol  2.5 mg Nebulization BID    cefTRIAXone (ROCEPHIN) IV  1,000 mg IntraVENous Q24H    ARIPiprazole  2 mg Oral Daily    aspirin  81 mg Oral Daily    atorvastatin  40 mg Oral Nightly    carvedilol  3.125 mg Oral BID    clopidogrel  75 mg Oral Daily    escitalopram  30 mg Oral Daily    famotidine  20 mg Oral Daily    fenofibrate  160 mg Oral Daily    ferrous sulfate  325 mg Oral BID    folic acid  1 mg Oral Daily    levothyroxine  75 mcg Oral QAM AC    pantoprazole  40 mg Oral QAM AC    QUEtiapine  300 mg Oral Nightly    Roflumilast  500 mcg Oral Daily    sucralfate  1 g Oral 4x Daily    traZODone  150 mg Oral Nightly    sodium chloride flush  10 mL IntraVENous 2 times per day    enoxaparin  40 mg SubCUTAneous Q24H    nicotine  1 patch TransDERmal Daily    guaiFENesin  600 mg Oral BID    predniSONE  40 mg Oral Daily    mometasone-formoterol  2 puff Inhalation BID    tiotropium  2 puff Inhalation Daily     PRN Meds: glucose, dextrose bolus **OR** dextrose bolus, glucagon (rDNA), dextrose, ALPRAZolam, phenazopyridine, sodium chloride flush, sodium chloride, ondansetron **OR** ondansetron, polyethylene glycol, acetaminophen **OR** acetaminophen, potassium chloride **OR** potassium alternative oral replacement **OR** potassium chloride, magnesium sulfate, albuterol sulfate HFA      Intake/Output Summary (Last 24 hours) at 11/6/2022 8615  Last data filed at 11/5/2022 1815  Gross per 24 hour   Intake --   Output 400 ml   Net -400 ml         Diet:  ADULT DIET; Regular    Exam:  /60   Pulse 59   Temp 97.9 °F (36.6 °C) (Oral)   Resp 18   Ht 5' 4\" (1.626 m)   Wt 161 lb 13.1 oz (73.4 kg)   SpO2 94%   BMI 27.78 kg/m²     General appearance: No apparent distress, appears stated age and cooperative. HEENT: Pupils equal, round, and reactive to light. Conjunctivae/corneas clear. Neck: Supple, with full range of motion. No jugular venous distention. Trachea midline. Respiratory:  Normal respiratory effort. Diminished to auscultation, bilaterally with occasional expiratory wheeze~much improved aeration. Speaks in complete sentences  Cardiovascular: Regular rate and rhythm with normal S1/S2 without murmurs, rubs or gallops. Abdomen: Soft, non-tender, non-distended with normal bowel sounds. Musculoskeletal: passive and active ROM x 4 extremities. Skin: Skin color, texture, turgor normal.    Neurologic:  Neurovascularly intact without any focal sensory/motor deficits.  Cranial nerves: II-XII intact, grossly non-focal.  Psychiatric: Alert and oriented, thought content appropriate  Capillary Refill: Brisk,< 3 seconds   Peripheral Pulses: +2 palpable, equal bilaterally       Labs:   Recent Labs     11/04/22  1631 11/05/22  0526   WBC 14.6* 10.2   HGB 13.7 12.7   HCT 42.8 39.2    245       Recent Labs     11/04/22  1631 11/05/22  0526    133*   K 4.0 4.2    100   CO2 22* 23   BUN 15 20   CREATININE 0.7 0.7   CALCIUM 10.0 9.6       Microbiology:    Strep pneumonia (-)  Legionella is pending (-)  Molecular pneumonia panel positive for Moraxella catarrhalis and staph aureus    Urinalysis:      Lab Results   Component Value Date/Time    NITRU NEGATIVE 07/28/2022 12:01 AM    WBCUA  07/28/2022 12:01 AM    WBCUA 0-2 04/19/2012 10:10 AM    BACTERIA NONE SEEN 07/28/2022 12:01 AM    RBCUA 0-2 07/28/2022 12:01 AM    BLOODU TRACE 07/28/2022 12:01 AM    SPECGRAV 1.012 02/20/2021 06:30 PM    GLUCOSEU NEGATIVE 07/28/2022 12:01 AM       Radiology:  XR CHEST PORTABLE    Result Date: 11/4/2022  PROCEDURE: XR CHEST PORTABLE CLINICAL INFORMATION: 79-year-old female with cough and shortness of breath. COMPARISON: Radiographs 9/20/2022. TECHNIQUE: AP upright view of the chest was obtained. FINDINGS: Interstitial opacities are present throughout both lungs. The cardiac silhouette and pulmonary vasculature are within normal limits. There is no significant pleural effusion or pneumothorax. Visualized portions of the upper abdomen are within normal limits. The osseous structures are intact. No acute fractures or suspicious osseous lesions. Prominent interstitial infiltrates are present throughout the lungs on both sides. **This report has been created using voice recognition software. It may contain minor errors which are inherent in voice recognition technology. ** Final report electronically signed by Dr Rivka Carey on 11/4/2022 4:34 PM      DVT prophylaxis: [x] Lovenox                                 [] SCDs                                 [] SQ Heparin                                 [] Encourage ambulation           [] Already on Anticoagulation     Code Status: Full Code    PT/OT Eval Status: Ambulate    Tele:   [] Yes              [x] no    Active Hospital Problems    Diagnosis Date Noted    Acute exacerbation of chronic obstructive pulmonary disease ([facility]) [facility] [J44.1] 11/04/2022     Priority: Medium       Electronically signed by BA Lindsey CNP on 11/6/2022 at 6:37 AM 112

## 2025-04-29 ENCOUNTER — TELEPHONE (OUTPATIENT)
Dept: NEPHROLOGY | Age: 70
End: 2025-04-29

## 2025-04-29 LAB — MAGNESIUM: 1.7 MG/DL

## 2025-04-29 RX ORDER — ROPINIROLE 1 MG/1
1 TABLET, FILM COATED ORAL NIGHTLY
COMMUNITY
Start: 2025-03-24

## 2025-04-29 RX ORDER — OXYCODONE HYDROCHLORIDE 5 MG/1
5 CAPSULE ORAL EVERY 4 HOURS PRN
COMMUNITY

## 2025-04-29 RX ORDER — CIPROFLOXACIN 500 MG/1
TABLET, FILM COATED ORAL
COMMUNITY
Start: 2024-11-19

## 2025-04-29 RX ORDER — ACETAMINOPHEN 325 MG/1
650 TABLET ORAL EVERY 4 HOURS PRN
COMMUNITY

## 2025-04-29 RX ORDER — AMOXICILLIN AND CLAVULANATE POTASSIUM 500; 125 MG/1; MG/1
TABLET, FILM COATED ORAL
COMMUNITY
Start: 2025-02-18

## 2025-04-29 NOTE — TELEPHONE ENCOUNTER
Shira from [employer] called today. She said the kit was mailed to the patient 3/25/25 and they did not receive it back completed.

## 2025-04-30 ENCOUNTER — OFFICE VISIT (OUTPATIENT)
Dept: NEPHROLOGY | Age: 70
End: 2025-04-30
Payer: MEDICARE

## 2025-04-30 ENCOUNTER — TELEPHONE (OUTPATIENT)
Dept: NEPHROLOGY | Age: 70
End: 2025-04-30

## 2025-04-30 VITALS
BODY MASS INDEX: 30.66 KG/M2 | SYSTOLIC BLOOD PRESSURE: 132 MMHG | OXYGEN SATURATION: 97 % | WEIGHT: 179.6 LBS | HEART RATE: 56 BPM | HEIGHT: 64 IN | DIASTOLIC BLOOD PRESSURE: 64 MMHG

## 2025-04-30 DIAGNOSIS — E11.21 DIABETIC NEPHROPATHY ASSOCIATED WITH TYPE 2 DIABETES MELLITUS (HCC): ICD-10-CM

## 2025-04-30 DIAGNOSIS — I10 PRIMARY HYPERTENSION: ICD-10-CM

## 2025-04-30 DIAGNOSIS — N18.2 CKD (CHRONIC KIDNEY DISEASE), STAGE II: Primary | ICD-10-CM

## 2025-04-30 PROCEDURE — 3017F COLORECTAL CA SCREEN DOC REV: CPT | Performed by: INTERNAL MEDICINE

## 2025-04-30 PROCEDURE — 2022F DILAT RTA XM EVC RTNOPTHY: CPT | Performed by: INTERNAL MEDICINE

## 2025-04-30 PROCEDURE — 1111F DSCHRG MED/CURRENT MED MERGE: CPT | Performed by: INTERNAL MEDICINE

## 2025-04-30 PROCEDURE — G8400 PT W/DXA NO RESULTS DOC: HCPCS | Performed by: INTERNAL MEDICINE

## 2025-04-30 PROCEDURE — 1123F ACP DISCUSS/DSCN MKR DOCD: CPT | Performed by: INTERNAL MEDICINE

## 2025-04-30 PROCEDURE — 99213 OFFICE O/P EST LOW 20 MIN: CPT | Performed by: INTERNAL MEDICINE

## 2025-04-30 PROCEDURE — 3046F HEMOGLOBIN A1C LEVEL >9.0%: CPT | Performed by: INTERNAL MEDICINE

## 2025-04-30 PROCEDURE — 1090F PRES/ABSN URINE INCON ASSESS: CPT | Performed by: INTERNAL MEDICINE

## 2025-04-30 PROCEDURE — 1036F TOBACCO NON-USER: CPT | Performed by: INTERNAL MEDICINE

## 2025-04-30 PROCEDURE — G8427 DOCREV CUR MEDS BY ELIG CLIN: HCPCS | Performed by: INTERNAL MEDICINE

## 2025-04-30 PROCEDURE — 1159F MED LIST DOCD IN RCRD: CPT | Performed by: INTERNAL MEDICINE

## 2025-04-30 PROCEDURE — 3075F SYST BP GE 130 - 139MM HG: CPT | Performed by: INTERNAL MEDICINE

## 2025-04-30 PROCEDURE — G8417 CALC BMI ABV UP PARAM F/U: HCPCS | Performed by: INTERNAL MEDICINE

## 2025-04-30 PROCEDURE — 3078F DIAST BP <80 MM HG: CPT | Performed by: INTERNAL MEDICINE

## 2025-04-30 NOTE — PROGRESS NOTES
Renal Progress Note    Assessment and Plan:      Diagnosis Orders   1. CKD (chronic kidney disease), stage II        2. Diabetic nephropathy associated with type 2 diabetes mellitus (HCC)        3. Primary hypertension                  PLAN:  Serum creatinine is good at 1.0 mg/dL with estimated GFR greater than 60 mL/min.  I discussed with the patient.  Diabetes mellitus is managed another provider.  Hypertension is well-controlled and also managed by another provider.  Magnesium is good at 1.7 mg/dL  Medications reviewed  No changes   Return visit as needed.  24-hour urine is still pending for litho link.  Will call the patient when the result becomes available.          Patient Active Problem List   Diagnosis    GERD (gastroesophageal reflux disease)    Colon polyps    COPD (chronic obstructive pulmonary disease) (HCC)    Bipolar 1 disorder (HCC)    Chronic pain    Hiatal hernia    Hyponatremia    High anion gap metabolic acidosis    Primary hypertension    Acquired hypothyroidism    Depression    Dyslipidemia    Chronic heart failure with preserved ejection fraction (HFpEF) (HCC)    Asymmetrical sensorineural hearing loss    Anxiety disorder    Amnesia    Wcliqoahk-Kfnqhhg-Ttuffx disease    Chronic midline low back pain without sciatica    Coronary vasospasm    Derangement of knee    Disturbance of skin sensation    Glaucoma suspect    Large liver    Lesion of ulnar nerve    Nicotine dependence    Pancreatic insufficiency    Primary osteoarthritis involving multiple joints    Sciatica    Type 2 diabetes mellitus with insulin therapy (HCC)    Urinary incontinence, overflow    Perinephric fluid collection    Vitamin B6 deficiency (non anemic)    Chronic anemia    CAD (coronary artery disease)    Physical deconditioning    Metabolic encephalopathy    Hyperlipidemia    Esophagitis    Diverticulosis    Hepatic steatosis    Iron deficiency anemia    Retroperitoneal fluid collection    Psoas abscess (HCC)

## 2025-05-01 NOTE — ADDENDUM NOTE
Addended by: Chidi Keene on: 5/18/2023 11:54 AM     Modules accepted: Orders Patient had a lumpectomy and radiation back in 2018-19.  She has bi-annual surveillance with a doctor in [place].    Orders:    CBC Auto Differential; Future    Comprehensive Metabolic Panel; Future    Hemoglobin A1C; Future

## 2025-05-02 NOTE — TELEPHONE ENCOUNTER
I did speak with [email or web address] on Tuesday. [facility] states they shipped it back to the lab on 4/26/25. I will call to check on it.

## 2025-05-06 DIAGNOSIS — N20.0 NEPHROLITHIASIS: ICD-10-CM

## 2025-05-11 ENCOUNTER — HOSPITAL ENCOUNTER (INPATIENT)
Age: 70
LOS: 2 days | Discharge: HOME | DRG: 392 | End: 2025-05-16
Attending: EMERGENCY MEDICINE | Admitting: STUDENT IN AN ORGANIZED HEALTH CARE EDUCATION/TRAINING PROGRAM
Payer: MEDICARE

## 2025-05-11 ENCOUNTER — APPOINTMENT (OUTPATIENT)
Dept: CT IMAGING | Age: 70
DRG: 392 | End: 2025-05-11
Payer: MEDICARE

## 2025-05-11 ENCOUNTER — APPOINTMENT (OUTPATIENT)
Dept: GENERAL RADIOLOGY | Age: 70
DRG: 392 | End: 2025-05-11
Payer: MEDICARE

## 2025-05-11 DIAGNOSIS — K92.2 GASTROINTESTINAL HEMORRHAGE, UNSPECIFIED GASTROINTESTINAL HEMORRHAGE TYPE: ICD-10-CM

## 2025-05-11 DIAGNOSIS — R19.7 DIARRHEA, UNSPECIFIED TYPE: Primary | ICD-10-CM

## 2025-05-11 DIAGNOSIS — R19.7 ACUTE DIARRHEA: ICD-10-CM

## 2025-05-11 LAB
ALBUMIN SERPL BCG-MCNC: 4.2 G/DL (ref 3.4–4.9)
ALP SERPL-CCNC: 78 U/L (ref 38–126)
ALT SERPL W/O P-5'-P-CCNC: 38 U/L (ref 10–35)
ANION GAP SERPL CALC-SCNC: 17 MEQ/L (ref 8–16)
AST SERPL-CCNC: 39 U/L (ref 10–35)
BASOPHILS ABSOLUTE: 0 THOU/MM3 (ref 0–0.1)
BASOPHILS NFR BLD AUTO: 0.3 %
BILIRUB CONJ SERPL-MCNC: < 0.1 MG/DL (ref 0–0.2)
BILIRUB SERPL-MCNC: 0.3 MG/DL (ref 0.3–1.2)
BUN SERPL-MCNC: 24 MG/DL (ref 8–23)
CALCIUM SERPL-MCNC: 11 MG/DL (ref 8.8–10.2)
CHLORIDE SERPL-SCNC: 96 MEQ/L (ref 98–111)
CO2 SERPL-SCNC: 19 MEQ/L (ref 22–29)
CREAT SERPL-MCNC: 1.2 MG/DL (ref 0.5–0.9)
DEPRECATED RDW RBC AUTO: 39.8 FL (ref 35–45)
EOSINOPHIL NFR BLD AUTO: 0.6 %
EOSINOPHILS ABSOLUTE: 0.1 THOU/MM3 (ref 0–0.4)
ERYTHROCYTE [DISTWIDTH] IN BLOOD BY AUTOMATED COUNT: 13.7 % (ref 11.5–14.5)
GFR SERPL CREATININE-BSD FRML MDRD: 49 ML/MIN/1.73M2
GLUCOSE SERPL-MCNC: 149 MG/DL (ref 74–109)
HCT VFR BLD AUTO: 40.6 % (ref 37–47)
HEMOCCULT STL QL: POSITIVE
HGB BLD-MCNC: 13.3 GM/DL (ref 12–16)
IMM GRANULOCYTES # BLD AUTO: 0.07 THOU/MM3 (ref 0–0.07)
IMM GRANULOCYTES NFR BLD AUTO: 0.5 %
INR PPP: 1.05 (ref 0.85–1.13)
LYMPHOCYTES ABSOLUTE: 2.1 THOU/MM3 (ref 1–4.8)
LYMPHOCYTES NFR BLD AUTO: 15.7 %
MCH RBC QN AUTO: 26.2 PG (ref 26–33)
MCHC RBC AUTO-ENTMCNC: 32.8 GM/DL (ref 32.2–35.5)
MCV RBC AUTO: 79.9 FL (ref 81–99)
MONOCYTES ABSOLUTE: 0.8 THOU/MM3 (ref 0.4–1.3)
MONOCYTES NFR BLD AUTO: 5.8 %
NEUTROPHILS ABSOLUTE: 10.2 THOU/MM3 (ref 1.8–7.7)
NEUTROPHILS NFR BLD AUTO: 77.1 %
NRBC BLD AUTO-RTO: 0 /100 WBC
OSMOLALITY SERPL CALC.SUM OF ELEC: 271.4 MOSMOL/KG (ref 275–300)
PLATELET # BLD AUTO: 404 THOU/MM3 (ref 130–400)
PMV BLD AUTO: 9.1 FL (ref 9.4–12.4)
POTASSIUM SERPL-SCNC: 4.6 MEQ/L (ref 3.5–5.2)
PROT SERPL-MCNC: 8.3 G/DL (ref 6.4–8.3)
RBC # BLD AUTO: 5.08 MILL/MM3 (ref 4.2–5.4)
SODIUM SERPL-SCNC: 132 MEQ/L (ref 135–145)
TROPONIN, HIGH SENSITIVITY: 26 NG/L (ref 0–12)
WBC # BLD AUTO: 13.2 THOU/MM3 (ref 4.8–10.8)

## 2025-05-11 PROCEDURE — 36415 COLL VENOUS BLD VENIPUNCTURE: CPT

## 2025-05-11 PROCEDURE — 82272 OCCULT BLD FECES 1-3 TESTS: CPT

## 2025-05-11 PROCEDURE — 83690 ASSAY OF LIPASE: CPT

## 2025-05-11 PROCEDURE — 80053 COMPREHEN METABOLIC PANEL: CPT

## 2025-05-11 PROCEDURE — 99285 EMERGENCY DEPT VISIT HI MDM: CPT

## 2025-05-11 PROCEDURE — 74177 CT ABD & PELVIS W/CONTRAST: CPT | Performed by: RADIOLOGY

## 2025-05-11 PROCEDURE — 93005 ELECTROCARDIOGRAM TRACING: CPT

## 2025-05-11 PROCEDURE — 85025 COMPLETE CBC W/AUTO DIFF WBC: CPT

## 2025-05-11 PROCEDURE — 86900 BLOOD TYPING SEROLOGIC ABO: CPT

## 2025-05-11 PROCEDURE — 71045 X-RAY EXAM CHEST 1 VIEW: CPT

## 2025-05-11 PROCEDURE — 6360000002 HC RX W HCPCS: Performed by: EMERGENCY MEDICINE

## 2025-05-11 PROCEDURE — 86885 COOMBS TEST INDIRECT QUAL: CPT

## 2025-05-11 PROCEDURE — 6360000004 HC RX CONTRAST MEDICATION: Performed by: EMERGENCY MEDICINE

## 2025-05-11 PROCEDURE — 85610 PROTHROMBIN TIME: CPT

## 2025-05-11 PROCEDURE — 86901 BLOOD TYPING SEROLOGIC RH(D): CPT

## 2025-05-11 PROCEDURE — 82248 BILIRUBIN DIRECT: CPT

## 2025-05-11 PROCEDURE — 84484 ASSAY OF TROPONIN QUANT: CPT

## 2025-05-11 PROCEDURE — 96375 TX/PRO/DX INJ NEW DRUG ADDON: CPT

## 2025-05-11 PROCEDURE — 71045 X-RAY EXAM CHEST 1 VIEW: CPT | Performed by: RADIOLOGY

## 2025-05-11 PROCEDURE — 74177 CT ABD & PELVIS W/CONTRAST: CPT

## 2025-05-11 PROCEDURE — 96374 THER/PROPH/DIAG INJ IV PUSH: CPT

## 2025-05-11 RX ORDER — PANTOPRAZOLE SODIUM 40 MG/10ML
80 INJECTION, POWDER, LYOPHILIZED, FOR SOLUTION INTRAVENOUS ONCE
Status: COMPLETED | OUTPATIENT
Start: 2025-05-11 | End: 2025-05-11

## 2025-05-11 RX ORDER — IOPAMIDOL 755 MG/ML
80 INJECTION, SOLUTION INTRAVASCULAR
Status: COMPLETED | OUTPATIENT
Start: 2025-05-11 | End: 2025-05-11

## 2025-05-11 RX ADMIN — IOPAMIDOL 80 ML: 755 INJECTION, SOLUTION INTRAVENOUS at 23:27

## 2025-05-11 RX ADMIN — PANTOPRAZOLE SODIUM 80 MG: 40 INJECTION, POWDER, FOR SOLUTION INTRAVENOUS at 23:37

## 2025-05-11 ASSESSMENT — PAIN DESCRIPTION - ORIENTATION: ORIENTATION: LEFT;LOWER

## 2025-05-11 ASSESSMENT — PAIN - FUNCTIONAL ASSESSMENT: PAIN_FUNCTIONAL_ASSESSMENT: 0-10

## 2025-05-11 ASSESSMENT — PAIN DESCRIPTION - LOCATION: LOCATION: ABDOMEN

## 2025-05-12 PROBLEM — R19.7 DIARRHEA: Status: ACTIVE | Noted: 2025-05-12

## 2025-05-12 LAB
ABO GROUP BLD: NORMAL
AMORPH SED URNS QL MICRO: ABNORMAL
ANION GAP SERPL CALC-SCNC: 12 MEQ/L (ref 8–16)
ANION GAP SERPL CALC-SCNC: 15 MEQ/L (ref 8–16)
ANION GAP SERPL CALC-SCNC: 15 MEQ/L (ref 8–16)
BACTERIA: ABNORMAL
BASOPHILS ABSOLUTE: 0 THOU/MM3 (ref 0–0.1)
BASOPHILS NFR BLD AUTO: 0.1 %
BILIRUB UR QL STRIP: NEGATIVE
BUN SERPL-MCNC: 17 MG/DL (ref 8–23)
BUN SERPL-MCNC: 20 MG/DL (ref 8–23)
BUN SERPL-MCNC: 23 MG/DL (ref 8–23)
C CAYETANENSIS DNA SPEC QL NAA+PROBE: NOT DETECTED
C DIFF GDH STL QL: NEGATIVE
CA-I BLD ISE-SCNC: 1.36 MMOL/L (ref 1.12–1.32)
CALCIUM SERPL-MCNC: 10 MG/DL (ref 8.8–10.2)
CALCIUM SERPL-MCNC: 10 MG/DL (ref 8.8–10.2)
CALCIUM SERPL-MCNC: 9.3 MG/DL (ref 8.8–10.2)
CAMPY SP DNA.DIARRHEA STL QL NAA+PROBE: NOT DETECTED
CASTS #/AREA URNS LPF: ABNORMAL /LPF
CASTS #/AREA URNS LPF: ABNORMAL /LPF
CHARACTER UR: CLEAR
CHARCOAL URNS QL MICRO: ABNORMAL
CHLORIDE SERPL-SCNC: 100 MEQ/L (ref 98–111)
CHLORIDE SERPL-SCNC: 101 MEQ/L (ref 98–111)
CHLORIDE SERPL-SCNC: 103 MEQ/L (ref 98–111)
CO2 SERPL-SCNC: 14 MEQ/L (ref 22–29)
CO2 SERPL-SCNC: 16 MEQ/L (ref 22–29)
CO2 SERPL-SCNC: 17 MEQ/L (ref 22–29)
COLOR UR: YELLOW
CREAT SERPL-MCNC: 0.9 MG/DL (ref 0.5–0.9)
CREAT SERPL-MCNC: 1.1 MG/DL (ref 0.5–0.9)
CREAT SERPL-MCNC: 1.1 MG/DL (ref 0.5–0.9)
CRYPTOSP DNA SPEC QL NAA+PROBE: NOT DETECTED
CRYSTALS URNS QL MICRO: ABNORMAL
CRYSTALS URNS QL MICRO: ABNORMAL
DEPRECATED RDW RBC AUTO: 42 FL (ref 35–45)
E COLI O157H7 DNA SPEC QL NAA+PROBE: ABNORMAL
E HISTOLYT DNA SPEC QL NAA+PROBE: NOT DETECTED
EAEC PAA PLAS AGGR+AATA ST NAA+NON-PRB: NOT DETECTED
EC STX1+STX2 + H7 FLIC SPEC NAA+PROBE: NOT DETECTED
EOSINOPHIL NFR BLD AUTO: 0.8 %
EOSINOPHILS ABSOLUTE: 0.1 THOU/MM3 (ref 0–0.4)
EPEC EAE GENE STL QL NAA+NON-PROBE: NOT DETECTED
EPITHELIAL CELLS, UA: ABNORMAL /HPF
ERYTHROCYTE [DISTWIDTH] IN BLOOD BY AUTOMATED COUNT: 13.7 % (ref 11.5–14.5)
ETEC LTA+ST1A+ST1B TOX ST NAA+NON-PROBE: NOT DETECTED
G LAMBLIA DNA SPEC QL NAA+PROBE: NOT DETECTED
GFR SERPL CREATININE-BSD FRML MDRD: 55 ML/MIN/1.73M2
GFR SERPL CREATININE-BSD FRML MDRD: 55 ML/MIN/1.73M2
GFR SERPL CREATININE-BSD FRML MDRD: 70 ML/MIN/1.73M2
GLUCOSE BLD STRIP.AUTO-MCNC: 164 MG/DL (ref 70–108)
GLUCOSE BLD STRIP.AUTO-MCNC: 168 MG/DL (ref 70–108)
GLUCOSE BLD STRIP.AUTO-MCNC: 173 MG/DL (ref 70–108)
GLUCOSE BLD STRIP.AUTO-MCNC: 209 MG/DL (ref 70–108)
GLUCOSE BLD STRIP.AUTO-MCNC: 240 MG/DL (ref 70–108)
GLUCOSE SERPL-MCNC: 177 MG/DL (ref 74–109)
GLUCOSE SERPL-MCNC: 197 MG/DL (ref 74–109)
GLUCOSE SERPL-MCNC: 244 MG/DL (ref 74–109)
GLUCOSE UR QL STRIP.AUTO: NEGATIVE MG/DL
HADV DNA SPEC QL NAA+PROBE: NOT DETECTED
HASTV RNA SPEC QL NAA+PROBE: NOT DETECTED
HCT VFR BLD AUTO: 39.4 % (ref 37–47)
HGB BLD-MCNC: 12.3 GM/DL (ref 12–16)
HGB UR QL STRIP.AUTO: ABNORMAL
IAT IGG-SP REAG SERPL QL: NORMAL
IMM GRANULOCYTES # BLD AUTO: 0.04 THOU/MM3 (ref 0–0.07)
IMM GRANULOCYTES NFR BLD AUTO: 0.4 %
KETONES UR QL STRIP.AUTO: NEGATIVE
LEUKOCYTE ESTERASE UR QL STRIP.AUTO: ABNORMAL
LIPASE SERPL-CCNC: 45 U/L (ref 13–60)
LYMPHOCYTES ABSOLUTE: 1 THOU/MM3 (ref 1–4.8)
LYMPHOCYTES NFR BLD AUTO: 11.2 %
MCH RBC QN AUTO: 26.1 PG (ref 26–33)
MCHC RBC AUTO-ENTMCNC: 31.2 GM/DL (ref 32.2–35.5)
MCV RBC AUTO: 83.5 FL (ref 81–99)
MISCELLANEOUS LAB TEST RESULT: ABNORMAL
MONOCYTES ABSOLUTE: 0.4 THOU/MM3 (ref 0.4–1.3)
MONOCYTES NFR BLD AUTO: 4.5 %
MUCOUS THREADS URNS QL MICRO: ABNORMAL
NEUTROPHILS ABSOLUTE: 7.5 THOU/MM3 (ref 1.8–7.7)
NEUTROPHILS NFR BLD AUTO: 83 %
NITRITE UR QL STRIP.AUTO: NEGATIVE
NOROVIRUS GI + GII RNA STL NAA+PROBE: DETECTED
NRBC BLD AUTO-RTO: 0 /100 WBC
P SHIGELLOIDES DNA STL QL NAA+PROBE: NOT DETECTED
PH UR STRIP.AUTO: 8 [PH] (ref 5–9)
PLATELET # BLD AUTO: 282 THOU/MM3 (ref 130–400)
PMV BLD AUTO: 9 FL (ref 9.4–12.4)
POTASSIUM SERPL-SCNC: 3.8 MEQ/L (ref 3.5–5.2)
POTASSIUM SERPL-SCNC: 4.5 MEQ/L (ref 3.5–5.2)
POTASSIUM SERPL-SCNC: 4.6 MEQ/L (ref 3.5–5.2)
PROT UR STRIP.AUTO-MCNC: NEGATIVE MG/DL
RBC # BLD AUTO: 4.72 MILL/MM3 (ref 4.2–5.4)
RBC #/AREA URNS HPF: ABNORMAL /HPF
RENAL EPI CELLS #/AREA URNS HPF: ABNORMAL /[HPF]
RH BLD: NORMAL
RV RNA SPEC QL NAA+PROBE: NOT DETECTED
SALMONELLA DNA SPEC QL NAA+PROBE: NOT DETECTED
SAPOVIRUS RNA SPEC QL NAA+PROBE: NOT DETECTED
SHIGELLA SP+EIEC IPAH ST NAA+NON-PROBE: NOT DETECTED
SODIUM SERPL-SCNC: 130 MEQ/L (ref 135–145)
SODIUM SERPL-SCNC: 131 MEQ/L (ref 135–145)
SODIUM SERPL-SCNC: 132 MEQ/L (ref 135–145)
SP GR UR REFRACT.AUTO: > 1.03 (ref 1–1.03)
TROPONIN, HIGH SENSITIVITY: 23 NG/L (ref 0–12)
TROPONIN, HIGH SENSITIVITY: 23 NG/L (ref 0–12)
UROBILINOGEN UR QL STRIP.AUTO: 0.2 EU/DL (ref 0–1)
V CHOLERAE DNA SPEC QL NAA+PROBE: NOT DETECTED
VALPROATE SERPL-MCNC: 10 UG/ML (ref 50–100)
VIBRIO DNA SPEC NAA+PROBE: NOT DETECTED
WBC # BLD AUTO: 9 THOU/MM3 (ref 4.8–10.8)
WBC #/AREA URNS HPF: ABNORMAL /HPF
Y ENTERO RECN STL QL NAA+PROBE: NOT DETECTED
YEAST LIKE FUNGI URNS QL MICRO: ABNORMAL

## 2025-05-12 PROCEDURE — 96361 HYDRATE IV INFUSION ADD-ON: CPT

## 2025-05-12 PROCEDURE — 87507 IADNA-DNA/RNA PROBE TQ 12-25: CPT

## 2025-05-12 PROCEDURE — 96375 TX/PRO/DX INJ NEW DRUG ADDON: CPT

## 2025-05-12 PROCEDURE — 36415 COLL VENOUS BLD VENIPUNCTURE: CPT

## 2025-05-12 PROCEDURE — 94640 AIRWAY INHALATION TREATMENT: CPT

## 2025-05-12 PROCEDURE — 87449 NOS EACH ORGANISM AG IA: CPT

## 2025-05-12 PROCEDURE — 80048 BASIC METABOLIC PNL TOTAL CA: CPT

## 2025-05-12 PROCEDURE — 2580000003 HC RX 258

## 2025-05-12 PROCEDURE — 2580000003 HC RX 258: Performed by: EMERGENCY MEDICINE

## 2025-05-12 PROCEDURE — 82948 REAGENT STRIP/BLOOD GLUCOSE: CPT

## 2025-05-12 PROCEDURE — 84484 ASSAY OF TROPONIN QUANT: CPT

## 2025-05-12 PROCEDURE — G0378 HOSPITAL OBSERVATION PER HR: HCPCS

## 2025-05-12 PROCEDURE — 96366 THER/PROPH/DIAG IV INF ADDON: CPT

## 2025-05-12 PROCEDURE — 99223 1ST HOSP IP/OBS HIGH 75: CPT | Performed by: STUDENT IN AN ORGANIZED HEALTH CARE EDUCATION/TRAINING PROGRAM

## 2025-05-12 PROCEDURE — 80164 ASSAY DIPROPYLACETIC ACD TOT: CPT

## 2025-05-12 PROCEDURE — 2580000003 HC RX 258: Performed by: STUDENT IN AN ORGANIZED HEALTH CARE EDUCATION/TRAINING PROGRAM

## 2025-05-12 PROCEDURE — 82330 ASSAY OF CALCIUM: CPT

## 2025-05-12 PROCEDURE — 94761 N-INVAS EAR/PLS OXIMETRY MLT: CPT

## 2025-05-12 PROCEDURE — 6370000000 HC RX 637 (ALT 250 FOR IP)

## 2025-05-12 PROCEDURE — 6370000000 HC RX 637 (ALT 250 FOR IP): Performed by: PHYSICIAN ASSISTANT

## 2025-05-12 PROCEDURE — 96376 TX/PRO/DX INJ SAME DRUG ADON: CPT

## 2025-05-12 PROCEDURE — 96372 THER/PROPH/DIAG INJ SC/IM: CPT

## 2025-05-12 PROCEDURE — 96365 THER/PROPH/DIAG IV INF INIT: CPT

## 2025-05-12 PROCEDURE — 2500000003 HC RX 250 WO HCPCS: Performed by: STUDENT IN AN ORGANIZED HEALTH CARE EDUCATION/TRAINING PROGRAM

## 2025-05-12 PROCEDURE — 81001 URINALYSIS AUTO W/SCOPE: CPT

## 2025-05-12 PROCEDURE — 6360000002 HC RX W HCPCS

## 2025-05-12 PROCEDURE — 6370000000 HC RX 637 (ALT 250 FOR IP): Performed by: STUDENT IN AN ORGANIZED HEALTH CARE EDUCATION/TRAINING PROGRAM

## 2025-05-12 PROCEDURE — 85025 COMPLETE CBC W/AUTO DIFF WBC: CPT

## 2025-05-12 RX ORDER — BUDESONIDE AND FORMOTEROL FUMARATE DIHYDRATE 80; 4.5 UG/1; UG/1
2 AEROSOL RESPIRATORY (INHALATION)
Status: DISCONTINUED | OUTPATIENT
Start: 2025-05-12 | End: 2025-05-16 | Stop reason: HOSPADM

## 2025-05-12 RX ORDER — POTASSIUM CHLORIDE 1500 MG/1
40 TABLET, EXTENDED RELEASE ORAL PRN
Status: DISCONTINUED | OUTPATIENT
Start: 2025-05-12 | End: 2025-05-16 | Stop reason: HOSPADM

## 2025-05-12 RX ORDER — OXYCODONE AND ACETAMINOPHEN 5; 325 MG/1; MG/1
1 TABLET ORAL EVERY 4 HOURS PRN
Refills: 0 | Status: DISCONTINUED | OUTPATIENT
Start: 2025-05-12 | End: 2025-05-12

## 2025-05-12 RX ORDER — SODIUM CHLORIDE 9 MG/ML
INJECTION, SOLUTION INTRAVENOUS CONTINUOUS
Status: DISCONTINUED | OUTPATIENT
Start: 2025-05-12 | End: 2025-05-12

## 2025-05-12 RX ORDER — OXYCODONE AND ACETAMINOPHEN 5; 325 MG/1; MG/1
2 TABLET ORAL EVERY 4 HOURS PRN
Refills: 0 | Status: DISCONTINUED | OUTPATIENT
Start: 2025-05-12 | End: 2025-05-12

## 2025-05-12 RX ORDER — POTASSIUM CHLORIDE 1500 MG/1
40 TABLET, EXTENDED RELEASE ORAL PRN
Status: DISCONTINUED | OUTPATIENT
Start: 2025-05-12 | End: 2025-05-12

## 2025-05-12 RX ORDER — CARVEDILOL 3.12 MG/1
3.12 TABLET ORAL 2 TIMES DAILY WITH MEALS
Status: DISCONTINUED | OUTPATIENT
Start: 2025-05-12 | End: 2025-05-16 | Stop reason: HOSPADM

## 2025-05-12 RX ORDER — ONDANSETRON 4 MG/1
4 TABLET, ORALLY DISINTEGRATING ORAL EVERY 8 HOURS PRN
Status: DISCONTINUED | OUTPATIENT
Start: 2025-05-12 | End: 2025-05-16 | Stop reason: HOSPADM

## 2025-05-12 RX ORDER — ACETAMINOPHEN 650 MG/1
650 SUPPOSITORY RECTAL EVERY 6 HOURS PRN
Status: DISCONTINUED | OUTPATIENT
Start: 2025-05-12 | End: 2025-05-16 | Stop reason: HOSPADM

## 2025-05-12 RX ORDER — OXYCODONE HYDROCHLORIDE 5 MG/1
5 TABLET ORAL EVERY 4 HOURS PRN
Refills: 0 | Status: DISCONTINUED | OUTPATIENT
Start: 2025-05-12 | End: 2025-05-16 | Stop reason: HOSPADM

## 2025-05-12 RX ORDER — POTASSIUM CHLORIDE 7.45 MG/ML
10 INJECTION INTRAVENOUS PRN
Status: DISCONTINUED | OUTPATIENT
Start: 2025-05-12 | End: 2025-05-12

## 2025-05-12 RX ORDER — ATORVASTATIN CALCIUM 40 MG/1
40 TABLET, FILM COATED ORAL NIGHTLY
Status: DISCONTINUED | OUTPATIENT
Start: 2025-05-12 | End: 2025-05-16 | Stop reason: HOSPADM

## 2025-05-12 RX ORDER — TRAZODONE HYDROCHLORIDE 100 MG/1
200 TABLET ORAL NIGHTLY
Status: DISCONTINUED | OUTPATIENT
Start: 2025-05-12 | End: 2025-05-16 | Stop reason: HOSPADM

## 2025-05-12 RX ORDER — LEVOTHYROXINE SODIUM 75 UG/1
75 TABLET ORAL
Status: DISCONTINUED | OUTPATIENT
Start: 2025-05-12 | End: 2025-05-16 | Stop reason: HOSPADM

## 2025-05-12 RX ORDER — DIVALPROEX SODIUM 125 MG/1
125 CAPSULE, COATED PELLETS ORAL 2 TIMES DAILY
Status: DISCONTINUED | OUTPATIENT
Start: 2025-05-12 | End: 2025-05-16 | Stop reason: HOSPADM

## 2025-05-12 RX ORDER — ACETAMINOPHEN 325 MG/1
650 TABLET ORAL EVERY 6 HOURS PRN
Status: DISCONTINUED | OUTPATIENT
Start: 2025-05-12 | End: 2025-05-16 | Stop reason: HOSPADM

## 2025-05-12 RX ORDER — POTASSIUM CHLORIDE 7.45 MG/ML
10 INJECTION INTRAVENOUS PRN
Status: DISCONTINUED | OUTPATIENT
Start: 2025-05-12 | End: 2025-05-16 | Stop reason: HOSPADM

## 2025-05-12 RX ORDER — GLUCAGON 1 MG/ML
1 KIT INJECTION PRN
Status: DISCONTINUED | OUTPATIENT
Start: 2025-05-12 | End: 2025-05-16 | Stop reason: HOSPADM

## 2025-05-12 RX ORDER — FLUTICASONE PROPIONATE 50 MCG
1 SPRAY, SUSPENSION (ML) NASAL 2 TIMES DAILY PRN
Status: DISCONTINUED | OUTPATIENT
Start: 2025-05-12 | End: 2025-05-16 | Stop reason: HOSPADM

## 2025-05-12 RX ORDER — MAGNESIUM SULFATE IN WATER 40 MG/ML
2000 INJECTION, SOLUTION INTRAVENOUS PRN
Status: DISCONTINUED | OUTPATIENT
Start: 2025-05-12 | End: 2025-05-12

## 2025-05-12 RX ORDER — INSULIN LISPRO 100 [IU]/ML
0-4 INJECTION, SOLUTION INTRAVENOUS; SUBCUTANEOUS
Status: DISCONTINUED | OUTPATIENT
Start: 2025-05-12 | End: 2025-05-16 | Stop reason: HOSPADM

## 2025-05-12 RX ORDER — PANTOPRAZOLE SODIUM 40 MG/1
40 TABLET, DELAYED RELEASE ORAL
Status: DISCONTINUED | OUTPATIENT
Start: 2025-05-12 | End: 2025-05-16 | Stop reason: HOSPADM

## 2025-05-12 RX ORDER — LEVOTHYROXINE SODIUM 150 UG/1
150 TABLET ORAL WEEKLY
Status: DISCONTINUED | OUTPATIENT
Start: 2025-05-18 | End: 2025-05-16 | Stop reason: HOSPADM

## 2025-05-12 RX ORDER — FENTANYL CITRATE 50 UG/ML
25 INJECTION, SOLUTION INTRAMUSCULAR; INTRAVENOUS ONCE
Status: COMPLETED | OUTPATIENT
Start: 2025-05-12 | End: 2025-05-12

## 2025-05-12 RX ORDER — DULOXETIN HYDROCHLORIDE 60 MG/1
60 CAPSULE, DELAYED RELEASE ORAL DAILY
COMMUNITY

## 2025-05-12 RX ORDER — AMLODIPINE BESYLATE 10 MG/1
10 TABLET ORAL DAILY
Status: DISCONTINUED | OUTPATIENT
Start: 2025-05-12 | End: 2025-05-16 | Stop reason: HOSPADM

## 2025-05-12 RX ORDER — SODIUM CHLORIDE 0.9 % (FLUSH) 0.9 %
5-40 SYRINGE (ML) INJECTION EVERY 12 HOURS SCHEDULED
Status: DISCONTINUED | OUTPATIENT
Start: 2025-05-12 | End: 2025-05-16 | Stop reason: HOSPADM

## 2025-05-12 RX ORDER — FOLIC ACID 1 MG/1
1 TABLET ORAL DAILY
Status: DISCONTINUED | OUTPATIENT
Start: 2025-05-12 | End: 2025-05-16 | Stop reason: HOSPADM

## 2025-05-12 RX ORDER — MAGNESIUM SULFATE IN WATER 40 MG/ML
2000 INJECTION, SOLUTION INTRAVENOUS PRN
Status: DISCONTINUED | OUTPATIENT
Start: 2025-05-12 | End: 2025-05-16 | Stop reason: HOSPADM

## 2025-05-12 RX ORDER — ROPINIROLE 1 MG/1
1 TABLET, FILM COATED ORAL NIGHTLY
Status: DISCONTINUED | OUTPATIENT
Start: 2025-05-12 | End: 2025-05-16 | Stop reason: HOSPADM

## 2025-05-12 RX ORDER — NITROGLYCERIN 0.4 MG/1
0.4 TABLET SUBLINGUAL
Status: DISCONTINUED | OUTPATIENT
Start: 2025-05-12 | End: 2025-05-16 | Stop reason: HOSPADM

## 2025-05-12 RX ORDER — 0.9 % SODIUM CHLORIDE 0.9 %
1000 INTRAVENOUS SOLUTION INTRAVENOUS ONCE
Status: COMPLETED | OUTPATIENT
Start: 2025-05-12 | End: 2025-05-12

## 2025-05-12 RX ORDER — LOPERAMIDE HYDROCHLORIDE 2 MG/1
2 CAPSULE ORAL 4 TIMES DAILY PRN
Status: DISCONTINUED | OUTPATIENT
Start: 2025-05-12 | End: 2025-05-12

## 2025-05-12 RX ORDER — ONDANSETRON 2 MG/ML
4 INJECTION INTRAMUSCULAR; INTRAVENOUS EVERY 6 HOURS PRN
Status: DISCONTINUED | OUTPATIENT
Start: 2025-05-12 | End: 2025-05-16 | Stop reason: HOSPADM

## 2025-05-12 RX ORDER — SODIUM CHLORIDE 0.9 % (FLUSH) 0.9 %
5-40 SYRINGE (ML) INJECTION PRN
Status: DISCONTINUED | OUTPATIENT
Start: 2025-05-12 | End: 2025-05-16 | Stop reason: HOSPADM

## 2025-05-12 RX ORDER — BUPROPION HYDROCHLORIDE 300 MG/1
300 TABLET ORAL EVERY MORNING
Status: DISCONTINUED | OUTPATIENT
Start: 2025-05-12 | End: 2025-05-16 | Stop reason: HOSPADM

## 2025-05-12 RX ORDER — ENOXAPARIN SODIUM 100 MG/ML
40 INJECTION SUBCUTANEOUS DAILY
Status: DISCONTINUED | OUTPATIENT
Start: 2025-05-12 | End: 2025-05-16 | Stop reason: HOSPADM

## 2025-05-12 RX ORDER — ESCITALOPRAM OXALATE 20 MG/1
20 TABLET ORAL DAILY
Status: DISCONTINUED | OUTPATIENT
Start: 2025-05-12 | End: 2025-05-16 | Stop reason: HOSPADM

## 2025-05-12 RX ORDER — DEXTROSE MONOHYDRATE 100 MG/ML
INJECTION, SOLUTION INTRAVENOUS CONTINUOUS PRN
Status: DISCONTINUED | OUTPATIENT
Start: 2025-05-12 | End: 2025-05-16 | Stop reason: HOSPADM

## 2025-05-12 RX ORDER — INSULIN GLARGINE 100 [IU]/ML
10 INJECTION, SOLUTION SUBCUTANEOUS NIGHTLY
Status: DISCONTINUED | OUTPATIENT
Start: 2025-05-12 | End: 2025-05-16 | Stop reason: HOSPADM

## 2025-05-12 RX ORDER — SODIUM CHLORIDE 9 MG/ML
INJECTION, SOLUTION INTRAVENOUS PRN
Status: DISCONTINUED | OUTPATIENT
Start: 2025-05-12 | End: 2025-05-16 | Stop reason: HOSPADM

## 2025-05-12 RX ORDER — POLYETHYLENE GLYCOL 3350 17 G/17G
17 POWDER, FOR SOLUTION ORAL DAILY PRN
Status: DISCONTINUED | OUTPATIENT
Start: 2025-05-12 | End: 2025-05-16 | Stop reason: HOSPADM

## 2025-05-12 RX ORDER — LANOLIN ALCOHOL/MO/W.PET/CERES
50 CREAM (GRAM) TOPICAL DAILY
Status: DISCONTINUED | OUTPATIENT
Start: 2025-05-12 | End: 2025-05-16 | Stop reason: HOSPADM

## 2025-05-12 RX ORDER — INSULIN LISPRO 100 [IU]/ML
0-10 INJECTION, SOLUTION INTRAVENOUS; SUBCUTANEOUS
Status: DISCONTINUED | OUTPATIENT
Start: 2025-05-12 | End: 2025-05-12

## 2025-05-12 RX ORDER — ALBUTEROL SULFATE 0.83 MG/ML
2.5 SOLUTION RESPIRATORY (INHALATION) EVERY 6 HOURS PRN
Status: DISCONTINUED | OUTPATIENT
Start: 2025-05-12 | End: 2025-05-16 | Stop reason: HOSPADM

## 2025-05-12 RX ADMIN — FENTANYL CITRATE 25 MCG: 50 INJECTION INTRAMUSCULAR; INTRAVENOUS at 01:06

## 2025-05-12 RX ADMIN — HYDROMORPHONE HYDROCHLORIDE 0.5 MG: 1 INJECTION, SOLUTION INTRAMUSCULAR; INTRAVENOUS; SUBCUTANEOUS at 05:52

## 2025-05-12 RX ADMIN — ROPINIROLE HYDROCHLORIDE 1 MG: 1 TABLET, FILM COATED ORAL at 20:17

## 2025-05-12 RX ADMIN — ENOXAPARIN SODIUM 40 MG: 100 INJECTION SUBCUTANEOUS at 09:14

## 2025-05-12 RX ADMIN — TRAZODONE HYDROCHLORIDE 200 MG: 100 TABLET ORAL at 20:17

## 2025-05-12 RX ADMIN — BUDESONIDE AND FORMOTEROL FUMARATE DIHYDRATE 2 PUFF: 80; 4.5 AEROSOL RESPIRATORY (INHALATION) at 21:25

## 2025-05-12 RX ADMIN — AMLODIPINE BESYLATE 10 MG: 10 TABLET ORAL at 09:14

## 2025-05-12 RX ADMIN — SODIUM CHLORIDE 1000 ML: 9 INJECTION, SOLUTION INTRAVENOUS at 01:06

## 2025-05-12 RX ADMIN — CARVEDILOL 3.12 MG: 3.12 TABLET, FILM COATED ORAL at 16:29

## 2025-05-12 RX ADMIN — OXYCODONE 5 MG: 5 TABLET ORAL at 04:11

## 2025-05-12 RX ADMIN — ESCITALOPRAM OXALATE 20 MG: 20 TABLET, FILM COATED ORAL at 09:15

## 2025-05-12 RX ADMIN — PANTOPRAZOLE SODIUM 40 MG: 40 TABLET, DELAYED RELEASE ORAL at 14:41

## 2025-05-12 RX ADMIN — INSULIN LISPRO 1 UNITS: 100 INJECTION, SOLUTION INTRAVENOUS; SUBCUTANEOUS at 20:21

## 2025-05-12 RX ADMIN — FOLIC ACID 1 MG: 1 TABLET ORAL at 09:15

## 2025-05-12 RX ADMIN — BUDESONIDE AND FORMOTEROL FUMARATE DIHYDRATE 2 PUFF: 80; 4.5 AEROSOL RESPIRATORY (INHALATION) at 09:00

## 2025-05-12 RX ADMIN — INSULIN GLARGINE 10 UNITS: 100 INJECTION, SOLUTION SUBCUTANEOUS at 20:18

## 2025-05-12 RX ADMIN — ATORVASTATIN CALCIUM 40 MG: 40 TABLET, FILM COATED ORAL at 20:17

## 2025-05-12 RX ADMIN — TIOTROPIUM BROMIDE INHALATION SPRAY 2 PUFF: 3.12 SPRAY, METERED RESPIRATORY (INHALATION) at 08:59

## 2025-05-12 RX ADMIN — DIVALPROEX SODIUM 125 MG: 125 CAPSULE ORAL at 09:14

## 2025-05-12 RX ADMIN — LEVOTHYROXINE SODIUM 75 MCG: 0.07 TABLET ORAL at 05:52

## 2025-05-12 RX ADMIN — SODIUM BICARBONATE: 84 INJECTION, SOLUTION INTRAVENOUS at 16:28

## 2025-05-12 RX ADMIN — HYDROMORPHONE HYDROCHLORIDE 0.5 MG: 1 INJECTION, SOLUTION INTRAMUSCULAR; INTRAVENOUS; SUBCUTANEOUS at 18:39

## 2025-05-12 RX ADMIN — PANTOPRAZOLE SODIUM 40 MG: 40 TABLET, DELAYED RELEASE ORAL at 05:52

## 2025-05-12 RX ADMIN — HYDROMORPHONE HYDROCHLORIDE 0.5 MG: 1 INJECTION, SOLUTION INTRAMUSCULAR; INTRAVENOUS; SUBCUTANEOUS at 12:09

## 2025-05-12 RX ADMIN — PYRIDOXINE HCL TAB 50 MG 50 MG: 50 TAB at 09:15

## 2025-05-12 RX ADMIN — DIVALPROEX SODIUM 125 MG: 125 CAPSULE ORAL at 20:17

## 2025-05-12 RX ADMIN — OXYCODONE AND ACETAMINOPHEN 2 TABLET: 325; 5 TABLET ORAL at 09:16

## 2025-05-12 RX ADMIN — BUPROPION HYDROCHLORIDE 300 MG: 300 TABLET, EXTENDED RELEASE ORAL at 09:14

## 2025-05-12 RX ADMIN — CARVEDILOL 3.12 MG: 3.12 TABLET, FILM COATED ORAL at 09:14

## 2025-05-12 RX ADMIN — SODIUM CHLORIDE: 0.9 INJECTION, SOLUTION INTRAVENOUS at 03:20

## 2025-05-12 RX ADMIN — OXYCODONE 5 MG: 5 TABLET ORAL at 14:41

## 2025-05-12 RX ADMIN — SODIUM CHLORIDE: 0.9 INJECTION, SOLUTION INTRAVENOUS at 02:40

## 2025-05-12 ASSESSMENT — PAIN - FUNCTIONAL ASSESSMENT
PAIN_FUNCTIONAL_ASSESSMENT: PREVENTS OR INTERFERES SOME ACTIVE ACTIVITIES AND ADLS
PAIN_FUNCTIONAL_ASSESSMENT: PREVENTS OR INTERFERES WITH MANY ACTIVE NOT PASSIVE ACTIVITIES
PAIN_FUNCTIONAL_ASSESSMENT: PREVENTS OR INTERFERES SOME ACTIVE ACTIVITIES AND ADLS
PAIN_FUNCTIONAL_ASSESSMENT: PREVENTS OR INTERFERES SOME ACTIVE ACTIVITIES AND ADLS
PAIN_FUNCTIONAL_ASSESSMENT: NONE - DENIES PAIN
PAIN_FUNCTIONAL_ASSESSMENT: PREVENTS OR INTERFERES SOME ACTIVE ACTIVITIES AND ADLS

## 2025-05-12 ASSESSMENT — ENCOUNTER SYMPTOMS
COUGH: 0
SHORTNESS OF BREATH: 1
NAUSEA: 1
DIARRHEA: 1
ABDOMINAL PAIN: 1

## 2025-05-12 ASSESSMENT — PAIN SCALES - GENERAL
PAINLEVEL_OUTOF10: 3
PAINLEVEL_OUTOF10: 5
PAINLEVEL_OUTOF10: 7
PAINLEVEL_OUTOF10: 10
PAINLEVEL_OUTOF10: 6
PAINLEVEL_OUTOF10: 7
PAINLEVEL_OUTOF10: 10
PAINLEVEL_OUTOF10: 4
PAINLEVEL_OUTOF10: 5

## 2025-05-12 ASSESSMENT — PAIN DESCRIPTION - DESCRIPTORS
DESCRIPTORS: ACHING
DESCRIPTORS: CRAMPING
DESCRIPTORS: ACHING
DESCRIPTORS: ACHING;SHARP
DESCRIPTORS: CRAMPING
DESCRIPTORS: ACHING

## 2025-05-12 ASSESSMENT — PAIN DESCRIPTION - ORIENTATION
ORIENTATION: LEFT;LOWER;UPPER
ORIENTATION: LEFT
ORIENTATION: MID

## 2025-05-12 ASSESSMENT — PAIN DESCRIPTION - LOCATION
LOCATION: ABDOMEN;BACK
LOCATION: ABDOMEN;BACK
LOCATION: ABDOMEN
LOCATION: ABDOMEN
LOCATION: ABDOMEN;BACK
LOCATION: ABDOMEN;BACK

## 2025-05-12 ASSESSMENT — PAIN SCALES - WONG BAKER: WONGBAKER_NUMERICALRESPONSE: NO HURT

## 2025-05-12 NOTE — H&P
remission (HCC)     Allergic rhinitis     Arthritis     back, arms, hips    Bacteriuria 10/13/2024    Bipolar 1 disorder (HCC)     Cancer (HCC) 2011    cancerous polyps removed - Dr. Silva    Cannabis abuse     Cataract     Cellulitis 02/2025    left side with drain after kidney removal L side    Change of, skin texture 04/21/2014    Cocaine abuse (HCC)     Colon polyps     COPD (chronic obstructive pulmonary disease) (HCC) 07/24/2014    Coronary vasospasm 08/17/2019    Depression     Disorder associated with Helicobacter species 04/27/2012    Diverticulitis of colon     Endometriosis 12/11/2019    Erosive esophagitis 07/06/2015    Erosive gastritis 10/10/2015    GERD (gastroesophageal reflux disease)     Glaucoma     H pylori ulcer 10/10/2015    Hearing loss     Hiatal hernia     History of arterial ischemic stroke 07/20/2019    History of colonoscopy 2002    History of DVT (deep vein thrombosis)     History of Helicobacter pylori infection     History of kidney stones     History of pulmonary embolism 07/24/2014    Hx of blood clots 06/17/2014    PE and collar bone area after shoulder surgery    Hyperlipidemia     Hypertension     Hypomagnesemia 10/13/2024    Left-sided chest pain 09/11/2024    Liver disease     enlarged liver - damaged with alcohol in past but no cirrhosis per patient    Nephrostomy tube displaced 10/11/2024    Paresthesias     Pneumonia 07/24/2014    Polysubstance abuse (HCC)     Renal hematoma, left 01/15/2023    Right arm weakness 05/06/2019    Seasonal allergies     sneezing    Sexual problems     Substance-induced psychotic disorder (HCC) 10/26/2018    Suicidal thoughts     2015 admitted to [facility] from [facility]    Thyroid disease     Tobacco abuse     Tobacco use disorder 02/18/2018    Urinary incontinence     Urinary tract infection in female 01/10/2024    Vomiting     Wears dentures     Wears glasses          Procedure Laterality Date    ABDOMEN SURGERY      x4 removed cysts and ovary removed     Stability: Low Risk  (5/12/2025)    Housing Stability Vital Sign     Unable to Pay for Housing in the Last Year: No     Number of Times Moved in the Last Year: 0     Homeless in the Last Year: No        Code status: Full Code     Electronically signed by Kenyetta Alexis MD on 5/12/2025 at 3:07 AM.   Case was discussed with the Attending, Dr. Stevens.

## 2025-05-12 NOTE — DISCHARGE INSTR - COC
Continuity of Care Form    Patient Name: Elli Coulter   :  1955  MRN:  [id number]    Admit date:  2025  Discharge date:  25    Code Status Order: Full Code   Advance Directives:     Admitting Physician:  Desiree Stevens DO  PCP: Ryan Montalvo MD    Discharging Nurse: Alejandra  Discharging Hospital Unit/Room#: 6K-02/002-A  Discharging Unit Phone Number: [phone]    Emergency Contact:   Extended Emergency Contact Information  Primary Emergency Contact: SREEKANTH BAEZ  Address: [address]  Home Phone: [phone]  Mobile Phone: [phone]  Relation: Niece/Nephew  Secondary Emergency Contact: AndreaseufemiaBailey   [facility]  Home Phone: [phone]  Mobile Phone: [phone]  Relation: Brother/Sister  Hearing or visual needs: None  Other needs: None  Preferred language: English   needed? No    Past Surgical History:  Past Surgical History:   Procedure Laterality Date    ABDOMEN SURGERY      x4 removed cysts and ovary removed    CARDIAC SURGERY      heart caths, no stents    CARPAL TUNNEL RELEASE Bilateral 2016    CHOLECYSTECTOMY, LAPAROSCOPIC  2015    Dr. Huff    COLONOSCOPY      CT ABSCESS DRAINAGE SOFT TISSUE  2024    CT DRAIN SOFT TISSUE ABSCESS 2024 [facility] CT SCAN    CT ABSCESS DRAINAGE SOFT TISSUE  2025    CT ABSCESS DRAINAGE SOFT TISSUE 2025 [facility] CT SCAN    CYSTOSCOPY N/A 02/10/2022    CYSTOSCOPY URETHRAL IMPLANT INJECTION performed by Sami [facility] MD at [facility] OR    CYSTOSCOPY N/A 2022    Cystoscopy Bladder Botox 200 units Left Stent Placement performed by Sami Lanier MD at [facility] OR    CYSTOSCOPY N/A 2023    CYSTOSCOPY WITH BLADDER BOTOX 200 UNITS performed by Sami Lanier MD at [facility] OR    CYSTOSCOPY W BIOPSY OF BLADDER N/A 2020    CYSTOSCOPY WITH BLADDER BIOPSIES performed by Chris Avila MD at [facility] OR    DILATATION, ESOPHAGUS      EGD      ELBOW

## 2025-05-12 NOTE — ED TRIAGE NOTES
Pt to ER via EMS from [facility]. Pt c/o LLQ pain with diarrhea. EMS concerned for STEMI. Cath lab paged at 3840. Pt denies CP, endorses SOB when asked. Pt given 324mg Aspirin and 4mg Zofran prior to arrival. EKG complete.

## 2025-05-12 NOTE — CARE COORDINATION
5/12/25, 3:02 PM EDT  Discharge Planning Evaluation  Social work consult received, patient from [facility].    Patient/Family preference is to return to [facility].    The patient's current payor source at the facility is medicaid.   Medicare skilled days available: yes  Medicare does the patient have a three midnight qualifying stay? no  Insurance precert:  yes if needs skilled  Message left with Mary at the facility.  Patient bed hold: yes  Anticipated transport plan: ambulette  Patient's Healthcare Decision Maker: Named in Scanned ACP Document       05/12/25 1458   Service Assessment   Patient Orientation Alert and Oriented   Cognition Alert   History Provided By Patient   Primary Caregiver Other (Comment)  (long-term at [facility])   Support Systems Family Members   Patient's Healthcare Decision Maker is: Named in Scanned ACP Document   PCP Verified by CM Yes   Last Visit to PCP Within last 6 months   Prior Functional Level Assistance with the following:;Housework;Shopping;Mobility;Feeding;Cooking   Current Functional Level Assistance with the following:;Housework;Shopping;Mobility;Feeding;Cooking   Can patient return to prior living arrangement Yes   Ability to make needs known: Good   Family able to assist with home care needs: No   Would you like for me to discuss the discharge plan with any other family members/significant others, and if so, who? No   Financial Resources None   Community Resources ECF/Home Care   CM/SW Referral Other (see comment)  (returning to [facility])   Discharge Planning   Type of Residence Skilled Nursing Facility   Living Arrangements Other (Comment)  (from facility)   Current Services Prior To Admission Extended Care Facility   Potential Assistance Needed N/A   DME Ordered? No   Potential Assistance Purchasing Medications No   Type of Home Care Services None   Patient expects to be discharged to: Skilled nursing facility   Follow Up Appointment: Best Day/Time  Monday AM    One/Two Story Residence One story   History of falls? 0   Services At/After Discharge   Transition of Care Consult (CM Consult) SNF   Partner SNF Yes   Services At/After Discharge Skilled Nursing Facility (SNF)   [place] Resource Information Provided? No   Mode of Transport at Discharge ALS   Confirm Follow Up Transport Self   Condition of Participation: Discharge Planning   The Plan for Transition of Care is related to the following treatment goals: Return to [facility]   The Patient and/or Patient Representative was provided with a Choice of Provider? Patient   The Patient and/Or Patient Representative agree with the Discharge Plan? Yes   Freedom of Choice list was provided with basic dialogue that supports the patient's individualized plan of care/goals, treatment preferences, and shares the quality data associated with the providers?  No

## 2025-05-12 NOTE — ED PROVIDER NOTES
[facility]  EMERGENCY DEPARTMENT ENCOUNTER          Pt Name: Elli Coulter  MRN: [id number]  Birthdate 1955  Date of evaluation: 5/11/2025  Treating Resident Physician: Paco Pepe MD  Supervising Physician: Jd Dickinson DO    History obtained from the patient.    CHIEF COMPLAINT       Chief Complaint   Patient presents with    Abdominal Pain     LLQ    Shortness of Breath    Diarrhea           HISTORY OF PRESENT ILLNESS    HPI  Elli Coulter is a 69 y.o. 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  Patient is unsure of when the pain started.  She stated that it was constant with severity 10/10 and did not radiate.  Associated symptoms included nausea and diarrhea, which had started this morning.  It was reportedly profuse diarrhea, foul-smelling.  Patient reports left lower quadrant abdominal pain is similar to prior episodes of intra-abdominal infection.    There was concern for STEMI based on EKG obtained by EMS, however assessment in the ED including full clinical history and repeat EKG resulted in cancellation of STEMI alert.      REVIEW OF SYSTEMS   Review of Systems   Constitutional:  Positive for appetite change and chills. Negative for fever.   Respiratory:  Positive for shortness of breath. Negative for cough.    Cardiovascular:  Negative for chest pain.   Gastrointestinal:  Positive for abdominal pain, diarrhea and nausea.   Genitourinary:  Negative for dysuria and hematuria.   Neurological:  Positive for dizziness. Negative for syncope.   Psychiatric/Behavioral:  Positive for decreased concentration. Negative for confusion.          PAST MEDICAL AND SURGICAL HISTORY     Past Medical History:   Diagnosis Date    Acute cystitis with hematuria 01/19/2024         (A negative COVID-19 test should be interpreted as COVID no longer suspected unless otherwise noted in this encounter documentation note)    Radiologic studies results:  CT ABDOMEN PELVIS W IV CONTRAST Additional Contrast? None   Final Result   No acute findings.      This document has been electronically signed by: Paco Ferrell MD on    05/12/2025 12:00 AM      All CTs at this facility use dose modulation techniques and iterative    reconstructions, and/or weight-based dosing   when appropriate to reduce radiation to a low as reasonably achievable.      XR CHEST PORTABLE   Final Result   1. No acute findings.      This document has been electronically signed by: Paco Ferrell MD on    05/11/2025 11:30 PM          ED Medications administered this visit:   Medications   sodium chloride 0.9 % bolus 1,000 mL (1,000 mLs IntraVENous New Bag 5/12/25 0106)   iopamidol (ISOVUE-370) 76 % injection 80 mL (80 mLs IntraVENous Given 5/11/25 2327)   pantoprazole (PROTONIX) injection 80 mg (80 mg IntraVENous Given 5/11/25 2337)   fentaNYL (SUBLIMAZE) injection 25 mcg (25 mcg IntraVENous Given 5/12/25 0106)         ED COURSE     ED Course as of 05/12/25 0206   Sun May 11, 2025   2355 Troponin, High Sensitivity(!): 26  Troponin mildly elevated, labs from last several months showed troponin around 19.  Repeat EKG without significant ST change, STEMI alert was canceled.  Will repeat troponin. [DH]   2357 WBC(!): 13.2  Mild leukocytosis [DH]   2357 ALT(!): 38  Mild transaminitis, labs from recent months show AST, ALT were WNL. [DH]   2357 Occult Blood Fecal: Positive  Occult blood screen positive.  Delivered Protonix 80 mg IV. [DH]      ED Course User Index  [DH] Paco Pepe MD         MEDICATION CHANGES     New Prescriptions    No medications on file         FINAL DISPOSITION   MDM    Patient is a 69-year-old female with PMH left nephrectomy, CKD stage II, IDDM 2, CHF who presented with left lower quadrant abdominal

## 2025-05-13 ENCOUNTER — APPOINTMENT (OUTPATIENT)
Dept: GENERAL RADIOLOGY | Age: 70
DRG: 392 | End: 2025-05-13
Payer: MEDICARE

## 2025-05-13 LAB
ANION GAP SERPL CALC-SCNC: 12 MEQ/L (ref 8–16)
BASOPHILS ABSOLUTE: 0 THOU/MM3 (ref 0–0.1)
BASOPHILS NFR BLD AUTO: 0.1 %
BUN SERPL-MCNC: 12 MG/DL (ref 8–23)
CALCIUM SERPL-MCNC: 9.3 MG/DL (ref 8.8–10.2)
CHLORIDE SERPL-SCNC: 97 MEQ/L (ref 98–111)
CO2 SERPL-SCNC: 23 MEQ/L (ref 22–29)
CREAT SERPL-MCNC: 0.8 MG/DL (ref 0.5–0.9)
DEPRECATED RDW RBC AUTO: 41.9 FL (ref 35–45)
EOSINOPHIL NFR BLD AUTO: 1.2 %
EOSINOPHILS ABSOLUTE: 0.1 THOU/MM3 (ref 0–0.4)
ERYTHROCYTE [DISTWIDTH] IN BLOOD BY AUTOMATED COUNT: 14 % (ref 11.5–14.5)
GFR SERPL CREATININE-BSD FRML MDRD: 80 ML/MIN/1.73M2
GLUCOSE BLD STRIP.AUTO-MCNC: 155 MG/DL (ref 70–108)
GLUCOSE BLD STRIP.AUTO-MCNC: 189 MG/DL (ref 70–108)
GLUCOSE BLD STRIP.AUTO-MCNC: 196 MG/DL (ref 70–108)
GLUCOSE BLD STRIP.AUTO-MCNC: 226 MG/DL (ref 70–108)
GLUCOSE SERPL-MCNC: 216 MG/DL (ref 74–109)
HCT VFR BLD AUTO: 33.7 % (ref 37–47)
HGB BLD-MCNC: 10.6 GM/DL (ref 12–16)
IMM GRANULOCYTES # BLD AUTO: 0.03 THOU/MM3 (ref 0–0.07)
IMM GRANULOCYTES NFR BLD AUTO: 0.4 %
LYMPHOCYTES ABSOLUTE: 1 THOU/MM3 (ref 1–4.8)
LYMPHOCYTES NFR BLD AUTO: 14.3 %
MCH RBC QN AUTO: 26 PG (ref 26–33)
MCHC RBC AUTO-ENTMCNC: 31.5 GM/DL (ref 32.2–35.5)
MCV RBC AUTO: 82.6 FL (ref 81–99)
MONOCYTES ABSOLUTE: 0.6 THOU/MM3 (ref 0.4–1.3)
MONOCYTES NFR BLD AUTO: 8.3 %
NEUTROPHILS ABSOLUTE: 5.1 THOU/MM3 (ref 1.8–7.7)
NEUTROPHILS NFR BLD AUTO: 75.7 %
NRBC BLD AUTO-RTO: 0 /100 WBC
PLATELET # BLD AUTO: 239 THOU/MM3 (ref 130–400)
PMV BLD AUTO: 9.4 FL (ref 9.4–12.4)
POTASSIUM SERPL-SCNC: 4.1 MEQ/L (ref 3.5–5.2)
RBC # BLD AUTO: 4.08 MILL/MM3 (ref 4.2–5.4)
SODIUM SERPL-SCNC: 132 MEQ/L (ref 135–145)
WBC # BLD AUTO: 6.8 THOU/MM3 (ref 4.8–10.8)

## 2025-05-13 PROCEDURE — 6360000002 HC RX W HCPCS

## 2025-05-13 PROCEDURE — 82948 REAGENT STRIP/BLOOD GLUCOSE: CPT

## 2025-05-13 PROCEDURE — 6370000000 HC RX 637 (ALT 250 FOR IP)

## 2025-05-13 PROCEDURE — 94640 AIRWAY INHALATION TREATMENT: CPT

## 2025-05-13 PROCEDURE — 96366 THER/PROPH/DIAG IV INF ADDON: CPT

## 2025-05-13 PROCEDURE — 74018 RADEX ABDOMEN 1 VIEW: CPT

## 2025-05-13 PROCEDURE — 2580000003 HC RX 258: Performed by: STUDENT IN AN ORGANIZED HEALTH CARE EDUCATION/TRAINING PROGRAM

## 2025-05-13 PROCEDURE — 94761 N-INVAS EAR/PLS OXIMETRY MLT: CPT

## 2025-05-13 PROCEDURE — 80048 BASIC METABOLIC PNL TOTAL CA: CPT

## 2025-05-13 PROCEDURE — 85025 COMPLETE CBC W/AUTO DIFF WBC: CPT

## 2025-05-13 PROCEDURE — 36415 COLL VENOUS BLD VENIPUNCTURE: CPT

## 2025-05-13 PROCEDURE — 2500000003 HC RX 250 WO HCPCS: Performed by: STUDENT IN AN ORGANIZED HEALTH CARE EDUCATION/TRAINING PROGRAM

## 2025-05-13 PROCEDURE — 6370000000 HC RX 637 (ALT 250 FOR IP): Performed by: STUDENT IN AN ORGANIZED HEALTH CARE EDUCATION/TRAINING PROGRAM

## 2025-05-13 PROCEDURE — 6370000000 HC RX 637 (ALT 250 FOR IP): Performed by: PHYSICIAN ASSISTANT

## 2025-05-13 PROCEDURE — 2500000003 HC RX 250 WO HCPCS

## 2025-05-13 PROCEDURE — 96376 TX/PRO/DX INJ SAME DRUG ADON: CPT

## 2025-05-13 PROCEDURE — 96372 THER/PROPH/DIAG INJ SC/IM: CPT

## 2025-05-13 PROCEDURE — 99233 SBSQ HOSP IP/OBS HIGH 50: CPT | Performed by: STUDENT IN AN ORGANIZED HEALTH CARE EDUCATION/TRAINING PROGRAM

## 2025-05-13 PROCEDURE — G0378 HOSPITAL OBSERVATION PER HR: HCPCS

## 2025-05-13 RX ADMIN — OXYCODONE 5 MG: 5 TABLET ORAL at 15:12

## 2025-05-13 RX ADMIN — OXYCODONE 5 MG: 5 TABLET ORAL at 08:41

## 2025-05-13 RX ADMIN — INSULIN LISPRO 1 UNITS: 100 INJECTION, SOLUTION INTRAVENOUS; SUBCUTANEOUS at 08:33

## 2025-05-13 RX ADMIN — SODIUM BICARBONATE: 84 INJECTION, SOLUTION INTRAVENOUS at 00:29

## 2025-05-13 RX ADMIN — HYDROMORPHONE HYDROCHLORIDE 0.5 MG: 1 INJECTION, SOLUTION INTRAMUSCULAR; INTRAVENOUS; SUBCUTANEOUS at 05:49

## 2025-05-13 RX ADMIN — LEVOTHYROXINE SODIUM 75 MCG: 0.07 TABLET ORAL at 05:52

## 2025-05-13 RX ADMIN — DIVALPROEX SODIUM 125 MG: 125 CAPSULE ORAL at 20:22

## 2025-05-13 RX ADMIN — INSULIN LISPRO 1 UNITS: 100 INJECTION, SOLUTION INTRAVENOUS; SUBCUTANEOUS at 20:28

## 2025-05-13 RX ADMIN — ATORVASTATIN CALCIUM 40 MG: 40 TABLET, FILM COATED ORAL at 20:22

## 2025-05-13 RX ADMIN — PYRIDOXINE HCL TAB 50 MG 50 MG: 50 TAB at 08:33

## 2025-05-13 RX ADMIN — BUDESONIDE AND FORMOTEROL FUMARATE DIHYDRATE 2 PUFF: 80; 4.5 AEROSOL RESPIRATORY (INHALATION) at 20:57

## 2025-05-13 RX ADMIN — BUDESONIDE AND FORMOTEROL FUMARATE DIHYDRATE 2 PUFF: 80; 4.5 AEROSOL RESPIRATORY (INHALATION) at 08:01

## 2025-05-13 RX ADMIN — PANTOPRAZOLE SODIUM 40 MG: 40 TABLET, DELAYED RELEASE ORAL at 05:49

## 2025-05-13 RX ADMIN — INSULIN LISPRO 1 UNITS: 100 INJECTION, SOLUTION INTRAVENOUS; SUBCUTANEOUS at 11:59

## 2025-05-13 RX ADMIN — SODIUM CHLORIDE, PRESERVATIVE FREE 10 ML: 5 INJECTION INTRAVENOUS at 20:23

## 2025-05-13 RX ADMIN — TRAZODONE HYDROCHLORIDE 200 MG: 100 TABLET ORAL at 20:22

## 2025-05-13 RX ADMIN — DIVALPROEX SODIUM 125 MG: 125 CAPSULE ORAL at 08:33

## 2025-05-13 RX ADMIN — BUPROPION HYDROCHLORIDE 300 MG: 300 TABLET, EXTENDED RELEASE ORAL at 08:33

## 2025-05-13 RX ADMIN — TIOTROPIUM BROMIDE INHALATION SPRAY 2 PUFF: 3.12 SPRAY, METERED RESPIRATORY (INHALATION) at 08:01

## 2025-05-13 RX ADMIN — ENOXAPARIN SODIUM 40 MG: 100 INJECTION SUBCUTANEOUS at 08:34

## 2025-05-13 RX ADMIN — HYDROMORPHONE HYDROCHLORIDE 0.5 MG: 1 INJECTION, SOLUTION INTRAMUSCULAR; INTRAVENOUS; SUBCUTANEOUS at 11:59

## 2025-05-13 RX ADMIN — HYDROMORPHONE HYDROCHLORIDE 0.5 MG: 1 INJECTION, SOLUTION INTRAMUSCULAR; INTRAVENOUS; SUBCUTANEOUS at 18:24

## 2025-05-13 RX ADMIN — FOLIC ACID 1 MG: 1 TABLET ORAL at 08:33

## 2025-05-13 RX ADMIN — INSULIN GLARGINE 10 UNITS: 100 INJECTION, SOLUTION SUBCUTANEOUS at 20:22

## 2025-05-13 RX ADMIN — ESCITALOPRAM OXALATE 20 MG: 20 TABLET, FILM COATED ORAL at 08:33

## 2025-05-13 RX ADMIN — HYDROMORPHONE HYDROCHLORIDE 0.5 MG: 1 INJECTION, SOLUTION INTRAMUSCULAR; INTRAVENOUS; SUBCUTANEOUS at 00:19

## 2025-05-13 RX ADMIN — ROPINIROLE HYDROCHLORIDE 1 MG: 1 TABLET, FILM COATED ORAL at 20:22

## 2025-05-13 RX ADMIN — CARVEDILOL 3.12 MG: 3.12 TABLET, FILM COATED ORAL at 15:12

## 2025-05-13 RX ADMIN — SODIUM CHLORIDE, PRESERVATIVE FREE 10 ML: 5 INJECTION INTRAVENOUS at 08:34

## 2025-05-13 RX ADMIN — PANTOPRAZOLE SODIUM 40 MG: 40 TABLET, DELAYED RELEASE ORAL at 15:12

## 2025-05-13 ASSESSMENT — PAIN DESCRIPTION - LOCATION
LOCATION: BACK
LOCATION: ABDOMEN
LOCATION: BACK;ABDOMEN
LOCATION: ABDOMEN
LOCATION: ABDOMEN;BACK

## 2025-05-13 ASSESSMENT — PAIN - FUNCTIONAL ASSESSMENT
PAIN_FUNCTIONAL_ASSESSMENT: ACTIVITIES ARE NOT PREVENTED

## 2025-05-13 ASSESSMENT — PAIN SCALES - GENERAL
PAINLEVEL_OUTOF10: 0
PAINLEVEL_OUTOF10: 8
PAINLEVEL_OUTOF10: 7
PAINLEVEL_OUTOF10: 10
PAINLEVEL_OUTOF10: 8
PAINLEVEL_OUTOF10: 7

## 2025-05-13 ASSESSMENT — PAIN DESCRIPTION - DESCRIPTORS
DESCRIPTORS: CRAMPING

## 2025-05-13 ASSESSMENT — PAIN SCALES - WONG BAKER: WONGBAKER_NUMERICALRESPONSE: NO HURT

## 2025-05-13 ASSESSMENT — PAIN DESCRIPTION - ORIENTATION
ORIENTATION: MID
ORIENTATION: LOWER
ORIENTATION: MID
ORIENTATION: LOWER

## 2025-05-13 NOTE — PROGRESS NOTES
Hospitalist Progress Note      Patient:  Elli Coulter    Unit/Bed:6K-02/002-A  YOB: 1955  MRN: [id number]   Acct: [id number]   PCP: Ryan Montalvo MD  Date of Admission: 5/11/2025    ASSESSMENT AND PLAN    Active Problems  Acute diarrhea secondary to norovirus infection, abdominal pain: Patient presented with weeklong history of diarrhea, loose watery/nonbloody. GI panel positive for norovirus. C. difficile toxin/antigen negative. CT abdomen/pelvis on 5/11/2025 negative for acute process. Mild leukocytosis of 13.2 on admission. WBC today 6.8. Patient having multiple episodes of diarrhea throughout the day. KUB from today shows nonobstructive bowel gas pattern. Continue supportive care.  Mild hyponatremia: Sodium level 132 today. Continue to monitor with daily BMP.  Elevated troponin: Likely secondary to demand ischemia.  Initial troponin level was 26, repeat 23, 23. There was initial STEMI concern on EKG obtained by EMS however assessment in ED with repeat EKG and physical exam noted it was not a STEMI. Patient denies having any chest pain, palpitations, shortness of breath. Low suspicion for ACS. Continues telemetry.  Resolved Problems  MARILY on CKD stage III: Likely secondary to prerenal etiology due to poor oral intake due to Norovirus. Initial creatinine level was 1.2.  Today creatinine improved to 0.8.  Continue to monitor with daily BMP.  High anion gap metabolic acidosis: S/p bicarb drip. CO2 23, anion gap 12 today. Continue to monitor daily with BMP.  Hypercalcemia: Calcium level 9.3 today. Continue to monitor daily with BMP.  Chronic Conditions (reviewed and stable unless otherwise stated)  Primary hypertension: Continue Norvasc 10 oral daily and Coreg 3.125 mg oral twice daily.  Hyperlipidemia: Continue Lipitor 40 mg oral nightly.  Hypothyroidism: Continue Synthroid 75 mg oral on  Monday/Tuesday/Wednesday/Thursday/Friday/Saturday and Synthroid 150 mcg oral every Sunday.  Type 2 diabetes mellitus: Continue Lantus 10 units subcu nightly. Low-dose sliding scale. Hypoglycemia protocol in place. POCT glucose checks.  Hx of left nephrectomy: Noted.  COPD: Continue Symbicort/Spiriva, Flonase, and albuterol.  Bipolar 1 disorder/depression: Continue Wellbutrin  mg oral daily, Lexapro 20 mg oral daily, and trazodone 1200 mg oral nightly.  GERD: Continue Protonix 40 mg oral twice daily.  Hx of colon cancerous polyps s/p removed in 2011: Noted  Hx of DVT: Noted.  Hx of polysubstance use: Noted.      LDA: []CVC / []PICC / []Midline / []Prater / []Drains / []Mediport / [x]None  Antibiotics: None  Steroids: None  Labs (still needed?): [x]Yes / []No  IVF (still needed?): []Yes / [x]No    Level of care: []Step Down / [x]Med-Surg  Bed Status: [x]Inpatient / []Observation  Telemetry: [x]Yes / []No  PT/OT: []Yes / [x]No    DVT Prophylaxis: [x] Lovenox / [] Heparin / [] SCDs / [] Already on Systemic Anticoagulation / [] None     Expected discharge date: 5/14/2025  Disposition: SNF     Code status: Full Code     ===================================================================    Chief Complaint: Abdominal pain, diarrhea   Subjective (past 24 hours):   Patient seen and examined this morning. No new issues overnight.  Patient states she is having abdominal cramping and many episodes of diarrhea. She reports she has some sores on her buttocks and has some blood noted on wiping. Discussed with RN/tech reported no bloody bowel movements were noted.  However she does have sores on her buttocks. Will continue to turn patient and apply cream on sores. Repeat KUB ordered showed nonobstructive gas pattern. Plan for discharge tomorrow.  Patient denies having any chest pain, shortness of breath, fever, or chills.      History Of Present Illness:    Per previous note \"Elli Coulter is a 69 y.o. female with PMHx who

## 2025-05-13 NOTE — PROGRESS NOTES
Pt was in bed and alone at the time of the visit. She was dealing with diarrhea. She was encouraged and blessed.    05/13/25 1517   Encounter Summary   Encounter Overview/Reason Initial Encounter   Service Provided For Patient   Referral/Consult From [facility]   Support System Family members   Last Encounter  05/13/25   Complexity of Encounter Low   Begin Time 1130   End Time  1136   Total Time Calculated 6 min   Spiritual/Emotional needs   Type Spiritual Support   Assessment/Intervention/Outcome   Assessment Anxious   Intervention Empowerment

## 2025-05-14 PROBLEM — R19.7 ACUTE DIARRHEA: Status: ACTIVE | Noted: 2025-05-14

## 2025-05-14 LAB
ANION GAP SERPL CALC-SCNC: 11 MEQ/L (ref 8–16)
BASOPHILS ABSOLUTE: 0 THOU/MM3 (ref 0–0.1)
BASOPHILS NFR BLD AUTO: 0.2 %
BUN SERPL-MCNC: 9 MG/DL (ref 8–23)
CALCIUM SERPL-MCNC: 9.9 MG/DL (ref 8.8–10.2)
CHLORIDE SERPL-SCNC: 101 MEQ/L (ref 98–111)
CO2 SERPL-SCNC: 23 MEQ/L (ref 22–29)
CREAT SERPL-MCNC: 0.7 MG/DL (ref 0.5–0.9)
DEPRECATED RDW RBC AUTO: 41.8 FL (ref 35–45)
EKG ATRIAL RATE: 68 BPM
EKG ATRIAL RATE: 70 BPM
EKG P AXIS: 53 DEGREES
EKG P AXIS: 57 DEGREES
EKG P AXIS: 59 DEGREES
EKG P AXIS: 65 DEGREES
EKG P-R INTERVAL: 148 MS
EKG P-R INTERVAL: 148 MS
EKG P-R INTERVAL: 152 MS
EKG P-R INTERVAL: 158 MS
EKG Q-T INTERVAL: 402 MS
EKG Q-T INTERVAL: 408 MS
EKG Q-T INTERVAL: 410 MS
EKG Q-T INTERVAL: 414 MS
EKG QRS DURATION: 76 MS
EKG QRS DURATION: 78 MS
EKG QRS DURATION: 78 MS
EKG QRS DURATION: 80 MS
EKG QTC CALCULATION (BAZETT): 433 MS
EKG QTC CALCULATION (BAZETT): 434 MS
EKG QTC CALCULATION (BAZETT): 435 MS
EKG QTC CALCULATION (BAZETT): 440 MS
EKG R AXIS: 32 DEGREES
EKG R AXIS: 33 DEGREES
EKG R AXIS: 33 DEGREES
EKG R AXIS: 9 DEGREES
EKG T AXIS: 71 DEGREES
EKG T AXIS: 74 DEGREES
EKG T AXIS: 76 DEGREES
EKG T AXIS: 77 DEGREES
EKG VENTRICULAR RATE: 68 BPM
EKG VENTRICULAR RATE: 70 BPM
EOSINOPHIL NFR BLD AUTO: 1.9 %
EOSINOPHILS ABSOLUTE: 0.1 THOU/MM3 (ref 0–0.4)
ERYTHROCYTE [DISTWIDTH] IN BLOOD BY AUTOMATED COUNT: 14 % (ref 11.5–14.5)
FERRITIN SERPL IA-MCNC: 313 NG/ML (ref 13–150)
FOLATE SERPL-MCNC: > 20 NG/ML (ref 4.6–34.8)
GFR SERPL CREATININE-BSD FRML MDRD: > 90 ML/MIN/1.73M2
GLUCOSE BLD STRIP.AUTO-MCNC: 149 MG/DL (ref 70–108)
GLUCOSE BLD STRIP.AUTO-MCNC: 160 MG/DL (ref 70–108)
GLUCOSE BLD STRIP.AUTO-MCNC: 195 MG/DL (ref 70–108)
GLUCOSE BLD STRIP.AUTO-MCNC: 230 MG/DL (ref 70–108)
GLUCOSE SERPL-MCNC: 141 MG/DL (ref 74–109)
HCT VFR BLD AUTO: 33.1 % (ref 37–47)
HCT VFR BLD AUTO: 33.2 % (ref 37–47)
HGB BLD-MCNC: 10.4 GM/DL (ref 12–16)
HGB BLD-MCNC: 10.5 GM/DL (ref 12–16)
IMM GRANULOCYTES # BLD AUTO: 0.03 THOU/MM3 (ref 0–0.07)
IMM GRANULOCYTES NFR BLD AUTO: 0.5 %
IRON SATN MFR SERPL: 5 % (ref 20–50)
IRON SERPL-MCNC: 10 UG/DL (ref 37–145)
LYMPHOCYTES ABSOLUTE: 1.5 THOU/MM3 (ref 1–4.8)
LYMPHOCYTES NFR BLD AUTO: 24.9 %
MCH RBC QN AUTO: 25.9 PG (ref 26–33)
MCHC RBC AUTO-ENTMCNC: 31.4 GM/DL (ref 32.2–35.5)
MCV RBC AUTO: 82.5 FL (ref 81–99)
MONOCYTES ABSOLUTE: 0.6 THOU/MM3 (ref 0.4–1.3)
MONOCYTES NFR BLD AUTO: 9.7 %
NEUTROPHILS ABSOLUTE: 3.9 THOU/MM3 (ref 1.8–7.7)
NEUTROPHILS NFR BLD AUTO: 62.8 %
NRBC BLD AUTO-RTO: 0 /100 WBC
PLATELET # BLD AUTO: 222 THOU/MM3 (ref 130–400)
PMV BLD AUTO: 8.9 FL (ref 9.4–12.4)
POTASSIUM SERPL-SCNC: 3.8 MEQ/L (ref 3.5–5.2)
RBC # BLD AUTO: 4.01 MILL/MM3 (ref 4.2–5.4)
SODIUM SERPL-SCNC: 135 MEQ/L (ref 135–145)
TIBC SERPL-MCNC: 210 UG/DL (ref 171–450)
VIT B12 SERPL-MCNC: 375 PG/ML (ref 232–1245)
WBC # BLD AUTO: 6.2 THOU/MM3 (ref 4.8–10.8)

## 2025-05-14 PROCEDURE — 6370000000 HC RX 637 (ALT 250 FOR IP)

## 2025-05-14 PROCEDURE — 94761 N-INVAS EAR/PLS OXIMETRY MLT: CPT

## 2025-05-14 PROCEDURE — 6370000000 HC RX 637 (ALT 250 FOR IP): Performed by: STUDENT IN AN ORGANIZED HEALTH CARE EDUCATION/TRAINING PROGRAM

## 2025-05-14 PROCEDURE — 6360000002 HC RX W HCPCS

## 2025-05-14 PROCEDURE — 83540 ASSAY OF IRON: CPT

## 2025-05-14 PROCEDURE — 85018 HEMOGLOBIN: CPT

## 2025-05-14 PROCEDURE — 85025 COMPLETE CBC W/AUTO DIFF WBC: CPT

## 2025-05-14 PROCEDURE — 2500000003 HC RX 250 WO HCPCS

## 2025-05-14 PROCEDURE — 82728 ASSAY OF FERRITIN: CPT

## 2025-05-14 PROCEDURE — 6370000000 HC RX 637 (ALT 250 FOR IP): Performed by: PHYSICIAN ASSISTANT

## 2025-05-14 PROCEDURE — 82746 ASSAY OF FOLIC ACID SERUM: CPT

## 2025-05-14 PROCEDURE — 85014 HEMATOCRIT: CPT

## 2025-05-14 PROCEDURE — 82607 VITAMIN B-12: CPT

## 2025-05-14 PROCEDURE — 94640 AIRWAY INHALATION TREATMENT: CPT

## 2025-05-14 PROCEDURE — 96376 TX/PRO/DX INJ SAME DRUG ADON: CPT

## 2025-05-14 PROCEDURE — 6360000002 HC RX W HCPCS: Performed by: STUDENT IN AN ORGANIZED HEALTH CARE EDUCATION/TRAINING PROGRAM

## 2025-05-14 PROCEDURE — 36415 COLL VENOUS BLD VENIPUNCTURE: CPT

## 2025-05-14 PROCEDURE — 83550 IRON BINDING TEST: CPT

## 2025-05-14 PROCEDURE — 2580000003 HC RX 258: Performed by: STUDENT IN AN ORGANIZED HEALTH CARE EDUCATION/TRAINING PROGRAM

## 2025-05-14 PROCEDURE — 99233 SBSQ HOSP IP/OBS HIGH 50: CPT | Performed by: STUDENT IN AN ORGANIZED HEALTH CARE EDUCATION/TRAINING PROGRAM

## 2025-05-14 PROCEDURE — 96372 THER/PROPH/DIAG INJ SC/IM: CPT

## 2025-05-14 PROCEDURE — 1200000003 HC TELEMETRY R&B

## 2025-05-14 PROCEDURE — 82948 REAGENT STRIP/BLOOD GLUCOSE: CPT

## 2025-05-14 PROCEDURE — 80048 BASIC METABOLIC PNL TOTAL CA: CPT

## 2025-05-14 RX ORDER — SIMETHICONE 40MG/0.6ML
40 SUSPENSION, DROPS(FINAL DOSAGE FORM)(ML) ORAL EVERY 6 HOURS PRN
Status: DISCONTINUED | OUTPATIENT
Start: 2025-05-14 | End: 2025-05-16 | Stop reason: HOSPADM

## 2025-05-14 RX ADMIN — SODIUM CHLORIDE, PRESERVATIVE FREE 10 ML: 5 INJECTION INTRAVENOUS at 08:21

## 2025-05-14 RX ADMIN — TIOTROPIUM BROMIDE INHALATION SPRAY 2 PUFF: 3.12 SPRAY, METERED RESPIRATORY (INHALATION) at 09:51

## 2025-05-14 RX ADMIN — OXYCODONE 5 MG: 5 TABLET ORAL at 21:32

## 2025-05-14 RX ADMIN — FOLIC ACID 1 MG: 1 TABLET ORAL at 08:20

## 2025-05-14 RX ADMIN — INSULIN LISPRO 2 UNITS: 100 INJECTION, SOLUTION INTRAVENOUS; SUBCUTANEOUS at 13:52

## 2025-05-14 RX ADMIN — BUPROPION HYDROCHLORIDE 300 MG: 300 TABLET, EXTENDED RELEASE ORAL at 08:19

## 2025-05-14 RX ADMIN — DIVALPROEX SODIUM 125 MG: 125 CAPSULE ORAL at 21:32

## 2025-05-14 RX ADMIN — PYRIDOXINE HCL TAB 50 MG 50 MG: 50 TAB at 08:20

## 2025-05-14 RX ADMIN — OXYCODONE 5 MG: 5 TABLET ORAL at 13:53

## 2025-05-14 RX ADMIN — PANTOPRAZOLE SODIUM 40 MG: 40 TABLET, DELAYED RELEASE ORAL at 06:59

## 2025-05-14 RX ADMIN — SIMETHICONE 40 MG: 20 SUSPENSION/ DROPS ORAL at 16:25

## 2025-05-14 RX ADMIN — IRON SUCROSE 200 MG: 20 INJECTION, SOLUTION INTRAVENOUS at 15:02

## 2025-05-14 RX ADMIN — HYDROMORPHONE HYDROCHLORIDE 0.5 MG: 1 INJECTION, SOLUTION INTRAMUSCULAR; INTRAVENOUS; SUBCUTANEOUS at 06:59

## 2025-05-14 RX ADMIN — BUDESONIDE AND FORMOTEROL FUMARATE DIHYDRATE 2 PUFF: 80; 4.5 AEROSOL RESPIRATORY (INHALATION) at 09:51

## 2025-05-14 RX ADMIN — ENOXAPARIN SODIUM 40 MG: 100 INJECTION SUBCUTANEOUS at 08:18

## 2025-05-14 RX ADMIN — CARVEDILOL 3.12 MG: 3.12 TABLET, FILM COATED ORAL at 08:20

## 2025-05-14 RX ADMIN — SODIUM CHLORIDE, PRESERVATIVE FREE 10 ML: 5 INJECTION INTRAVENOUS at 21:33

## 2025-05-14 RX ADMIN — ONDANSETRON 4 MG: 2 INJECTION, SOLUTION INTRAMUSCULAR; INTRAVENOUS at 20:30

## 2025-05-14 RX ADMIN — HYDROMORPHONE HYDROCHLORIDE 0.5 MG: 1 INJECTION, SOLUTION INTRAMUSCULAR; INTRAVENOUS; SUBCUTANEOUS at 00:08

## 2025-05-14 RX ADMIN — PANTOPRAZOLE SODIUM 40 MG: 40 TABLET, DELAYED RELEASE ORAL at 16:25

## 2025-05-14 RX ADMIN — INSULIN LISPRO 1 UNITS: 100 INJECTION, SOLUTION INTRAVENOUS; SUBCUTANEOUS at 21:33

## 2025-05-14 RX ADMIN — BUDESONIDE AND FORMOTEROL FUMARATE DIHYDRATE 2 PUFF: 80; 4.5 AEROSOL RESPIRATORY (INHALATION) at 21:00

## 2025-05-14 RX ADMIN — TRAZODONE HYDROCHLORIDE 200 MG: 100 TABLET ORAL at 21:32

## 2025-05-14 RX ADMIN — ATORVASTATIN CALCIUM 40 MG: 40 TABLET, FILM COATED ORAL at 21:32

## 2025-05-14 RX ADMIN — INSULIN GLARGINE 10 UNITS: 100 INJECTION, SOLUTION SUBCUTANEOUS at 21:33

## 2025-05-14 RX ADMIN — AMLODIPINE BESYLATE 10 MG: 10 TABLET ORAL at 08:19

## 2025-05-14 RX ADMIN — LEVOTHYROXINE SODIUM 75 MCG: 0.07 TABLET ORAL at 07:01

## 2025-05-14 RX ADMIN — ESCITALOPRAM OXALATE 20 MG: 20 TABLET, FILM COATED ORAL at 08:19

## 2025-05-14 RX ADMIN — ROPINIROLE HYDROCHLORIDE 1 MG: 1 TABLET, FILM COATED ORAL at 21:32

## 2025-05-14 RX ADMIN — DIVALPROEX SODIUM 125 MG: 125 CAPSULE ORAL at 08:20

## 2025-05-14 ASSESSMENT — PAIN SCALES - GENERAL
PAINLEVEL_OUTOF10: 6
PAINLEVEL_OUTOF10: 8
PAINLEVEL_OUTOF10: 0
PAINLEVEL_OUTOF10: 8
PAINLEVEL_OUTOF10: 0

## 2025-05-14 ASSESSMENT — PAIN DESCRIPTION - LOCATION: LOCATION: ABDOMEN;BACK

## 2025-05-14 ASSESSMENT — PAIN DESCRIPTION - DESCRIPTORS: DESCRIPTORS: ACHING

## 2025-05-14 ASSESSMENT — PAIN DESCRIPTION - ORIENTATION: ORIENTATION: LEFT

## 2025-05-14 NOTE — PROGRESS NOTES
Hospitalist Progress Note      Patient:  Elli Coulter    Unit/Bed:6K-02/002-A  YOB: 1955  MRN: [id number]   Acct: [id number]   PCP: Ryan Montalvo MD  Date of Admission: 5/11/2025    ASSESSMENT AND PLAN    Active Problems  Acute diarrhea secondary to norovirus infection, abdominal pain: Patient presented with weeklong history of diarrhea, loose watery/nonbloody. GI panel positive for norovirus. C. difficile toxin/antigen negative. CT abdomen/pelvis on 5/11/2025 negative for acute process. Mild leukocytosis of 13.2 on admission. WBC today 6.2. Patient having multiple episodes of diarrhea throughout the day. KUB from 5/13/2025 shows nonobstructive bowel gas pattern. Continue supportive care and will add simethicone 40 mg oral every 6 hours as needed for abdominal cramping.  Iron deficiency anemia: Initial hemoglobin on arrival was 13.3.  Today it is 10.4. Patient has had IV fluids/bicarb drip which is now stopped. Patient reports noticing some bloody bowel movements however nurse/tech has examined the movements and no blood was noted. Ordered iron studies which showed iron 10, iron saturation 5%, TIBC 210, ferritin 313. Will give IV Venofer 200 mg daily for 3 days. Will need oral iron supplementation on discharge. Continue to monitor with daily CBC. Transfuse PRBC if hemoglobin less than 7 or hemodynamically unstable.  Elevated troponin: Likely secondary to demand ischemia. Initial troponin level was 26, repeat 23, 23. There was initial STEMI concern on EKG obtained by EMS however assessment in ED with repeat EKG and physical exam noted it was not a STEMI. Patient denies having any chest pain, palpitations, shortness of breath. Low suspicion for ACS. Continues telemetry.  Resolved Problems  MARILY on CKD stage III: Likely secondary to prerenal etiology due to poor oral intake due to Norovirus. Initial creatinine level was 1.2.

## 2025-05-14 NOTE — CARE COORDINATION
5/14/25, 1:37 PM EDT    DISCHARGE ON GOING EVALUATION    Elli CHEATH[facility] day: 0  Location: -02/002-A Reason for admit: Diarrhea [R19.7]  Gastrointestinal hemorrhage, unspecified gastrointestinal hemorrhage type [K92.2]  Diarrhea, unspecified type [R19.7]  Acute diarrhea [R19.7]     Procedures: none       Imaging since last note: none    Barriers to Discharge: Patient reports bloody BM. Dr. Stevens wants to monitor Hgb as it dropped slightly, and will plan discharge tomorrow.     PCP: Ryan Montalvo MD  Readmission Risk Score: 32.3    Patient Goals/Plan/Treatment Preferences: return to [facility]

## 2025-05-15 ENCOUNTER — APPOINTMENT (OUTPATIENT)
Dept: GENERAL RADIOLOGY | Age: 70
DRG: 392 | End: 2025-05-15
Payer: MEDICARE

## 2025-05-15 LAB
ALBUMIN SERPL BCG-MCNC: 3 G/DL (ref 3.4–4.9)
ALP SERPL-CCNC: 56 U/L (ref 38–126)
ALT SERPL W/O P-5'-P-CCNC: 30 U/L (ref 10–35)
ANION GAP SERPL CALC-SCNC: 11 MEQ/L (ref 8–16)
AST SERPL-CCNC: 19 U/L (ref 10–35)
BILIRUB SERPL-MCNC: < 0.2 MG/DL (ref 0.3–1.2)
BUN SERPL-MCNC: 11 MG/DL (ref 8–23)
CALCIUM SERPL-MCNC: 9.3 MG/DL (ref 8.8–10.2)
CHLORIDE SERPL-SCNC: 99 MEQ/L (ref 98–111)
CO2 SERPL-SCNC: 21 MEQ/L (ref 22–29)
CREAT SERPL-MCNC: 0.7 MG/DL (ref 0.5–0.9)
DEPRECATED RDW RBC AUTO: 41.5 FL (ref 35–45)
ERYTHROCYTE [DISTWIDTH] IN BLOOD BY AUTOMATED COUNT: 13.8 % (ref 11.5–14.5)
GFR SERPL CREATININE-BSD FRML MDRD: > 90 ML/MIN/1.73M2
GLUCOSE BLD STRIP.AUTO-MCNC: 139 MG/DL (ref 70–108)
GLUCOSE BLD STRIP.AUTO-MCNC: 178 MG/DL (ref 70–108)
GLUCOSE BLD STRIP.AUTO-MCNC: 193 MG/DL (ref 70–108)
GLUCOSE BLD STRIP.AUTO-MCNC: 211 MG/DL (ref 70–108)
GLUCOSE SERPL-MCNC: 190 MG/DL (ref 74–109)
HCT VFR BLD AUTO: 31.5 % (ref 37–47)
HCT VFR BLD AUTO: 32.4 % (ref 37–47)
HGB BLD-MCNC: 10.1 GM/DL (ref 12–16)
HGB BLD-MCNC: 10.4 GM/DL (ref 12–16)
MCH RBC QN AUTO: 26.6 PG (ref 26–33)
MCHC RBC AUTO-ENTMCNC: 32.1 GM/DL (ref 32.2–35.5)
MCV RBC AUTO: 82.9 FL (ref 81–99)
PLATELET # BLD AUTO: 222 THOU/MM3 (ref 130–400)
PMV BLD AUTO: 8.9 FL (ref 9.4–12.4)
POTASSIUM SERPL-SCNC: 4 MEQ/L (ref 3.5–5.2)
PROT SERPL-MCNC: 5.9 G/DL (ref 6.4–8.3)
RBC # BLD AUTO: 3.8 MILL/MM3 (ref 4.2–5.4)
SODIUM SERPL-SCNC: 131 MEQ/L (ref 135–145)
WBC # BLD AUTO: 6.7 THOU/MM3 (ref 4.8–10.8)

## 2025-05-15 PROCEDURE — 85027 COMPLETE CBC AUTOMATED: CPT

## 2025-05-15 PROCEDURE — 80053 COMPREHEN METABOLIC PANEL: CPT

## 2025-05-15 PROCEDURE — 6370000000 HC RX 637 (ALT 250 FOR IP): Performed by: PHYSICIAN ASSISTANT

## 2025-05-15 PROCEDURE — 2580000003 HC RX 258: Performed by: STUDENT IN AN ORGANIZED HEALTH CARE EDUCATION/TRAINING PROGRAM

## 2025-05-15 PROCEDURE — 94640 AIRWAY INHALATION TREATMENT: CPT

## 2025-05-15 PROCEDURE — 6370000000 HC RX 637 (ALT 250 FOR IP): Performed by: STUDENT IN AN ORGANIZED HEALTH CARE EDUCATION/TRAINING PROGRAM

## 2025-05-15 PROCEDURE — 99233 SBSQ HOSP IP/OBS HIGH 50: CPT | Performed by: STUDENT IN AN ORGANIZED HEALTH CARE EDUCATION/TRAINING PROGRAM

## 2025-05-15 PROCEDURE — 82948 REAGENT STRIP/BLOOD GLUCOSE: CPT

## 2025-05-15 PROCEDURE — 74018 RADEX ABDOMEN 1 VIEW: CPT

## 2025-05-15 PROCEDURE — 6360000002 HC RX W HCPCS: Performed by: STUDENT IN AN ORGANIZED HEALTH CARE EDUCATION/TRAINING PROGRAM

## 2025-05-15 PROCEDURE — 6360000002 HC RX W HCPCS

## 2025-05-15 PROCEDURE — 85018 HEMOGLOBIN: CPT

## 2025-05-15 PROCEDURE — 94761 N-INVAS EAR/PLS OXIMETRY MLT: CPT

## 2025-05-15 PROCEDURE — 6370000000 HC RX 637 (ALT 250 FOR IP)

## 2025-05-15 PROCEDURE — 85014 HEMATOCRIT: CPT

## 2025-05-15 PROCEDURE — 36415 COLL VENOUS BLD VENIPUNCTURE: CPT

## 2025-05-15 PROCEDURE — 2500000003 HC RX 250 WO HCPCS

## 2025-05-15 PROCEDURE — 1200000003 HC TELEMETRY R&B

## 2025-05-15 RX ADMIN — FOLIC ACID 1 MG: 1 TABLET ORAL at 08:28

## 2025-05-15 RX ADMIN — PANTOPRAZOLE SODIUM 40 MG: 40 TABLET, DELAYED RELEASE ORAL at 06:58

## 2025-05-15 RX ADMIN — OXYCODONE 5 MG: 5 TABLET ORAL at 14:37

## 2025-05-15 RX ADMIN — IRON SUCROSE 200 MG: 20 INJECTION, SOLUTION INTRAVENOUS at 08:36

## 2025-05-15 RX ADMIN — AMLODIPINE BESYLATE 10 MG: 10 TABLET ORAL at 08:27

## 2025-05-15 RX ADMIN — INSULIN LISPRO 1 UNITS: 100 INJECTION, SOLUTION INTRAVENOUS; SUBCUTANEOUS at 19:46

## 2025-05-15 RX ADMIN — ESCITALOPRAM OXALATE 20 MG: 20 TABLET, FILM COATED ORAL at 08:28

## 2025-05-15 RX ADMIN — OXYCODONE 5 MG: 5 TABLET ORAL at 03:45

## 2025-05-15 RX ADMIN — ROPINIROLE HYDROCHLORIDE 1 MG: 1 TABLET, FILM COATED ORAL at 21:17

## 2025-05-15 RX ADMIN — DIVALPROEX SODIUM 125 MG: 125 CAPSULE ORAL at 21:17

## 2025-05-15 RX ADMIN — BUDESONIDE AND FORMOTEROL FUMARATE DIHYDRATE 2 PUFF: 80; 4.5 AEROSOL RESPIRATORY (INHALATION) at 09:02

## 2025-05-15 RX ADMIN — PANTOPRAZOLE SODIUM 40 MG: 40 TABLET, DELAYED RELEASE ORAL at 16:58

## 2025-05-15 RX ADMIN — DIVALPROEX SODIUM 125 MG: 125 CAPSULE ORAL at 08:27

## 2025-05-15 RX ADMIN — INSULIN LISPRO 1 UNITS: 100 INJECTION, SOLUTION INTRAVENOUS; SUBCUTANEOUS at 11:40

## 2025-05-15 RX ADMIN — OXYCODONE 5 MG: 5 TABLET ORAL at 08:30

## 2025-05-15 RX ADMIN — TRAZODONE HYDROCHLORIDE 200 MG: 100 TABLET ORAL at 19:42

## 2025-05-15 RX ADMIN — INSULIN GLARGINE 10 UNITS: 100 INJECTION, SOLUTION SUBCUTANEOUS at 19:46

## 2025-05-15 RX ADMIN — TIOTROPIUM BROMIDE INHALATION SPRAY 2 PUFF: 3.12 SPRAY, METERED RESPIRATORY (INHALATION) at 09:02

## 2025-05-15 RX ADMIN — PYRIDOXINE HCL TAB 50 MG 50 MG: 50 TAB at 08:28

## 2025-05-15 RX ADMIN — LEVOTHYROXINE SODIUM 75 MCG: 0.07 TABLET ORAL at 06:58

## 2025-05-15 RX ADMIN — SODIUM CHLORIDE, PRESERVATIVE FREE 10 ML: 5 INJECTION INTRAVENOUS at 19:42

## 2025-05-15 RX ADMIN — BUDESONIDE AND FORMOTEROL FUMARATE DIHYDRATE 2 PUFF: 80; 4.5 AEROSOL RESPIRATORY (INHALATION) at 20:34

## 2025-05-15 RX ADMIN — ATORVASTATIN CALCIUM 40 MG: 40 TABLET, FILM COATED ORAL at 19:42

## 2025-05-15 RX ADMIN — BUPROPION HYDROCHLORIDE 300 MG: 300 TABLET, EXTENDED RELEASE ORAL at 08:28

## 2025-05-15 RX ADMIN — ENOXAPARIN SODIUM 40 MG: 100 INJECTION SUBCUTANEOUS at 08:27

## 2025-05-15 RX ADMIN — OXYCODONE 5 MG: 5 TABLET ORAL at 19:47

## 2025-05-15 RX ADMIN — SODIUM CHLORIDE, PRESERVATIVE FREE 10 ML: 5 INJECTION INTRAVENOUS at 08:28

## 2025-05-15 RX ADMIN — CARVEDILOL 3.12 MG: 3.12 TABLET, FILM COATED ORAL at 16:57

## 2025-05-15 ASSESSMENT — PAIN DESCRIPTION - ORIENTATION
ORIENTATION: LOWER
ORIENTATION: LOWER
ORIENTATION: MID

## 2025-05-15 ASSESSMENT — PAIN SCALES - GENERAL
PAINLEVEL_OUTOF10: 6
PAINLEVEL_OUTOF10: 8
PAINLEVEL_OUTOF10: 3
PAINLEVEL_OUTOF10: 8
PAINLEVEL_OUTOF10: 9
PAINLEVEL_OUTOF10: 6
PAINLEVEL_OUTOF10: 7

## 2025-05-15 ASSESSMENT — PAIN DESCRIPTION - DESCRIPTORS
DESCRIPTORS: ACHING;SORE
DESCRIPTORS: ACHING
DESCRIPTORS: ACHING;SORE

## 2025-05-15 ASSESSMENT — PAIN DESCRIPTION - LOCATION
LOCATION: BACK

## 2025-05-15 ASSESSMENT — PAIN SCALES - WONG BAKER: WONGBAKER_NUMERICALRESPONSE: NO HURT

## 2025-05-15 NOTE — PROGRESS NOTES
inherent in voice recognition technology.**      Electronically signed by Dr Anshul Pablo      CT ABDOMEN PELVIS W IV CONTRAST Additional Contrast? None   Final Result   No acute findings.      This document has been electronically signed by: Paco Ferrell MD on    05/12/2025 12:00 AM      All CTs at this facility use dose modulation techniques and iterative    reconstructions, and/or weight-based dosing   when appropriate to reduce radiation to a low as reasonably achievable.      XR CHEST PORTABLE   Final Result   1. No acute findings.      This document has been electronically signed by: Paco Ferrell MD on    05/11/2025 11:30 PM        CT ABDOMEN PELVIS W IV CONTRAST Additional Contrast? None  Result Date: 5/12/2025  CT abdomen and pelvis with contrast Comparison: CT/SR - CT ABDOMEN PELVIS W IV CONTRAST - 3/31/25 22:18 EDT Findings: No consolidation or effusion. Status post cholecystectomy. The liver, spleen, adrenals and pancreas are normal. The left kidney is surgically absent. Fluid collection at the site of prior nephrectomy measuring 1.4 x 6.3 cm No bowel obstruction, pneumoperitoneum, or pneumatosis. Pelvic contents unremarkable. Normal appendix. The bones are intact.     No acute findings. This document has been electronically signed by: Paco Ferrell MD on 05/12/2025 12:00 AM All CTs at this facility use dose modulation techniques and iterative reconstructions, and/or weight-based dosing when appropriate to reduce radiation to a low as reasonably achievable.    XR CHEST PORTABLE  Result Date: 5/11/2025  1 view chest x-ray Comparison: CR/SR - XR CHEST PORTABLE - 3/31/25 22:40 EDT Findings: The lungs are clear. Normal size heart. No acute fracture.     1. No acute findings. This document has been electronically signed by: Paco Ferrell MD on 05/11/2025 11:30 PM      Electronically signed by Desiree Stevens DO on 5/15/2025 at 6:34 PM

## 2025-05-16 VITALS
HEIGHT: 64 IN | WEIGHT: 183.64 LBS | HEART RATE: 60 BPM | OXYGEN SATURATION: 94 % | TEMPERATURE: 97.9 F | SYSTOLIC BLOOD PRESSURE: 134 MMHG | BODY MASS INDEX: 31.35 KG/M2 | DIASTOLIC BLOOD PRESSURE: 68 MMHG | RESPIRATION RATE: 19 BRPM

## 2025-05-16 LAB
ALBUMIN SERPL BCG-MCNC: 3.2 G/DL (ref 3.4–4.9)
ALP SERPL-CCNC: 72 U/L (ref 38–126)
ALT SERPL W/O P-5'-P-CCNC: 34 U/L (ref 10–35)
ANION GAP SERPL CALC-SCNC: 9 MEQ/L (ref 8–16)
ANION GAP SERPL CALC-SCNC: 9 MEQ/L (ref 8–16)
AST SERPL-CCNC: 31 U/L (ref 10–35)
BILIRUB SERPL-MCNC: 0.2 MG/DL (ref 0.3–1.2)
BUN SERPL-MCNC: 11 MG/DL (ref 8–23)
BUN SERPL-MCNC: 11 MG/DL (ref 8–23)
CALCIUM SERPL-MCNC: 10 MG/DL (ref 8.8–10.2)
CALCIUM SERPL-MCNC: 9.9 MG/DL (ref 8.8–10.2)
CHLORIDE SERPL-SCNC: 100 MEQ/L (ref 98–111)
CHLORIDE SERPL-SCNC: 99 MEQ/L (ref 98–111)
CO2 SERPL-SCNC: 24 MEQ/L (ref 22–29)
CO2 SERPL-SCNC: 24 MEQ/L (ref 22–29)
CREAT SERPL-MCNC: 0.9 MG/DL (ref 0.5–0.9)
CREAT SERPL-MCNC: 1 MG/DL (ref 0.5–0.9)
DEPRECATED RDW RBC AUTO: 42.2 FL (ref 35–45)
ERYTHROCYTE [DISTWIDTH] IN BLOOD BY AUTOMATED COUNT: 13.9 % (ref 11.5–14.5)
GFR SERPL CREATININE-BSD FRML MDRD: 61 ML/MIN/1.73M2
GFR SERPL CREATININE-BSD FRML MDRD: 70 ML/MIN/1.73M2
GLUCOSE BLD STRIP.AUTO-MCNC: 149 MG/DL (ref 70–108)
GLUCOSE BLD STRIP.AUTO-MCNC: 173 MG/DL (ref 70–108)
GLUCOSE SERPL-MCNC: 155 MG/DL (ref 74–109)
GLUCOSE SERPL-MCNC: 187 MG/DL (ref 74–109)
HCT VFR BLD AUTO: 32.2 % (ref 37–47)
HGB BLD-MCNC: 10.2 GM/DL (ref 12–16)
MCH RBC QN AUTO: 26.4 PG (ref 26–33)
MCHC RBC AUTO-ENTMCNC: 31.7 GM/DL (ref 32.2–35.5)
MCV RBC AUTO: 83.4 FL (ref 81–99)
PLATELET # BLD AUTO: 280 THOU/MM3 (ref 130–400)
PMV BLD AUTO: 9.2 FL (ref 9.4–12.4)
POTASSIUM SERPL-SCNC: 4.8 MEQ/L (ref 3.5–5.2)
POTASSIUM SERPL-SCNC: 4.9 MEQ/L (ref 3.5–5.2)
PROT SERPL-MCNC: 6.3 G/DL (ref 6.4–8.3)
RBC # BLD AUTO: 3.86 MILL/MM3 (ref 4.2–5.4)
SODIUM SERPL-SCNC: 132 MEQ/L (ref 135–145)
SODIUM SERPL-SCNC: 133 MEQ/L (ref 135–145)
WBC # BLD AUTO: 8.4 THOU/MM3 (ref 4.8–10.8)

## 2025-05-16 PROCEDURE — 94761 N-INVAS EAR/PLS OXIMETRY MLT: CPT

## 2025-05-16 PROCEDURE — 6370000000 HC RX 637 (ALT 250 FOR IP): Performed by: STUDENT IN AN ORGANIZED HEALTH CARE EDUCATION/TRAINING PROGRAM

## 2025-05-16 PROCEDURE — 2500000003 HC RX 250 WO HCPCS

## 2025-05-16 PROCEDURE — 80053 COMPREHEN METABOLIC PANEL: CPT

## 2025-05-16 PROCEDURE — 85027 COMPLETE CBC AUTOMATED: CPT

## 2025-05-16 PROCEDURE — 6360000002 HC RX W HCPCS

## 2025-05-16 PROCEDURE — 2580000003 HC RX 258: Performed by: STUDENT IN AN ORGANIZED HEALTH CARE EDUCATION/TRAINING PROGRAM

## 2025-05-16 PROCEDURE — 94640 AIRWAY INHALATION TREATMENT: CPT

## 2025-05-16 PROCEDURE — 82948 REAGENT STRIP/BLOOD GLUCOSE: CPT

## 2025-05-16 PROCEDURE — 6360000002 HC RX W HCPCS: Performed by: STUDENT IN AN ORGANIZED HEALTH CARE EDUCATION/TRAINING PROGRAM

## 2025-05-16 PROCEDURE — 36415 COLL VENOUS BLD VENIPUNCTURE: CPT

## 2025-05-16 PROCEDURE — 99239 HOSP IP/OBS DSCHRG MGMT >30: CPT | Performed by: STUDENT IN AN ORGANIZED HEALTH CARE EDUCATION/TRAINING PROGRAM

## 2025-05-16 PROCEDURE — 6370000000 HC RX 637 (ALT 250 FOR IP)

## 2025-05-16 RX ORDER — FERROUS SULFATE 325(65) MG
325 TABLET ORAL EVERY OTHER DAY
Refills: 3 | DISCHARGE
Start: 2025-05-16 | End: 2025-06-15

## 2025-05-16 RX ADMIN — LEVOTHYROXINE SODIUM 75 MCG: 0.07 TABLET ORAL at 05:13

## 2025-05-16 RX ADMIN — ENOXAPARIN SODIUM 40 MG: 100 INJECTION SUBCUTANEOUS at 08:52

## 2025-05-16 RX ADMIN — OXYCODONE 5 MG: 5 TABLET ORAL at 05:16

## 2025-05-16 RX ADMIN — BUDESONIDE AND FORMOTEROL FUMARATE DIHYDRATE 2 PUFF: 80; 4.5 AEROSOL RESPIRATORY (INHALATION) at 09:08

## 2025-05-16 RX ADMIN — ESCITALOPRAM OXALATE 20 MG: 20 TABLET, FILM COATED ORAL at 08:52

## 2025-05-16 RX ADMIN — TIOTROPIUM BROMIDE INHALATION SPRAY 2 PUFF: 3.12 SPRAY, METERED RESPIRATORY (INHALATION) at 09:08

## 2025-05-16 RX ADMIN — PYRIDOXINE HCL TAB 50 MG 50 MG: 50 TAB at 08:52

## 2025-05-16 RX ADMIN — AMLODIPINE BESYLATE 10 MG: 10 TABLET ORAL at 08:52

## 2025-05-16 RX ADMIN — IRON SUCROSE 200 MG: 20 INJECTION, SOLUTION INTRAVENOUS at 08:51

## 2025-05-16 RX ADMIN — SODIUM CHLORIDE, PRESERVATIVE FREE 10 ML: 5 INJECTION INTRAVENOUS at 08:53

## 2025-05-16 RX ADMIN — OXYCODONE 5 MG: 5 TABLET ORAL at 12:31

## 2025-05-16 RX ADMIN — DIVALPROEX SODIUM 125 MG: 125 CAPSULE ORAL at 08:52

## 2025-05-16 RX ADMIN — PANTOPRAZOLE SODIUM 40 MG: 40 TABLET, DELAYED RELEASE ORAL at 05:13

## 2025-05-16 RX ADMIN — FOLIC ACID 1 MG: 1 TABLET ORAL at 08:52

## 2025-05-16 RX ADMIN — BUPROPION HYDROCHLORIDE 300 MG: 300 TABLET, EXTENDED RELEASE ORAL at 08:52

## 2025-05-16 ASSESSMENT — PAIN DESCRIPTION - LOCATION
LOCATION: BACK

## 2025-05-16 ASSESSMENT — PAIN DESCRIPTION - ORIENTATION
ORIENTATION: LEFT;LOWER;RIGHT
ORIENTATION: LEFT;RIGHT;MID
ORIENTATION: RIGHT;LEFT;LOWER;MID

## 2025-05-16 ASSESSMENT — PAIN SCALES - WONG BAKER
WONGBAKER_NUMERICALRESPONSE: NO HURT
WONGBAKER_NUMERICALRESPONSE: NO HURT

## 2025-05-16 ASSESSMENT — PAIN SCALES - GENERAL
PAINLEVEL_OUTOF10: 4
PAINLEVEL_OUTOF10: 7
PAINLEVEL_OUTOF10: 5
PAINLEVEL_OUTOF10: 8
PAINLEVEL_OUTOF10: 6
PAINLEVEL_OUTOF10: 5

## 2025-05-16 ASSESSMENT — PAIN DESCRIPTION - ONSET: ONSET: GRADUAL

## 2025-05-16 ASSESSMENT — PAIN DESCRIPTION - FREQUENCY: FREQUENCY: CONTINUOUS

## 2025-05-16 ASSESSMENT — PAIN DESCRIPTION - DESCRIPTORS
DESCRIPTORS: SHARP;ACHING
DESCRIPTORS: SHARP
DESCRIPTORS: ACHING;SORE

## 2025-05-16 ASSESSMENT — PAIN DESCRIPTION - PAIN TYPE: TYPE: CHRONIC PAIN

## 2025-05-16 NOTE — CARE COORDINATION
5/16/25, 1:33 PM EDT    Patient goals/plan/ treatment preferences discussed by  and .  Patient goals/plan/ treatment preferences reviewed with patient/ family.  Patient/ family verbalize understanding of discharge plan and are in agreement with goal/plan/treatment preferences.  Understanding was demonstrated using the teach back method.  AVS provided by RN at time of discharge, which includes all necessary medical information pertaining to the patients current course of illness, treatment, post-discharge goals of care, and treatment preferences.     Services At/After Discharge: Long Term Care, Aide services, In ambulette, and Nursing service    [facility].  [facility] for transport at 3 pm.  ROBERTO left message with pts sister, Bailey.    ROBERTO faxed AVS.  RN is aware

## 2025-05-16 NOTE — DISCHARGE SUMMARY
Hospital Medicine Discharge Summary      Patient Identification:   Elli Coulter   : 1955  MRN: [id number]   Account: [id number]      Patient's PCP: Ryan Montalvo MD    Admit Date: 2025     Discharge Date:     Admitting Physician: Desiree Stevens DO     Discharge Physician: Desiree Stevens DO     Discharge Diagnoses:    Active Problems  Acute diarrhea secondary to norovirus infection, abdominal pain: Resolved. Patient presented with weeklong history of diarrhea, loose watery/nonbloody. GI panel positive for norovirus. C. difficile toxin/antigen negative. CT abdomen/pelvis on 2025 negative for acute process. Mild leukocytosis of 13.2 on admission. WBC today 8.4. Patient has had multiple episodes of diarrhea throughout the last 2 days. KUB from 5/15/25 showed no acute findings. Check BMP and CBC in 3 days. Close follow-up with PCP and GI on discharge.  Iron deficiency anemia: Initial hemoglobin on arrival was 13.3.  Today it is 10.4. S/p IV fluids/bicarb drip. Patient reports noticing some bloody bowel movements however nurse/tech has examined the movements and no blood was noted. Iron studies which showed iron 10, iron saturation 5%, TIBC 210, ferritin 313. S/p IV Venofer 200 mg daily for 3 days ( to ).  Started on oral iron 325 mg every other day on discharge. Check CBC in 3 days. Close follow-up with GI on discharge.  Patient reports following with Dr. Monreal outpatient.   Elevated troponin: Likely secondary to demand ischemia. Initial troponin level was 26, repeat 23, 23. There was initial STEMI concern on EKG obtained by EMS however assessment in ED with repeat EKG and physical exam noted it was not a STEMI. Patient denies having any chest pain, palpitations, shortness of breath. Low suspicion for ACS. Continues telemetry.  Resolved Problems  MARILY on CKD stage III: Likely secondary to prerenal etiology due to poor oral intake due to Norovirus. Initial creatinine  not yet available    Ask your nurse or doctor about these medications  ferrous sulfate 325 (65 Fe) MG tablet         Time Spent on discharge is more than 45 minutes in the examination, evaluation, counseling and review of medications and discharge plan.        Signed:    Thank you Ryan Montalvo MD for the opportunity to be involved in this patient's care.    Electronically signed by Desiree Stevens DO on 5/16/2025 at 6:34 PM

## 2025-05-16 NOTE — PLAN OF CARE
Problem: Chronic Conditions and Co-morbidities  Goal: Patient's chronic conditions and co-morbidity symptoms are monitored and maintained or improved  5/16/2025 1022 by Alejandra Kim RN  Outcome: Progressing  Flowsheets (Taken 5/16/2025 0845)  Care Plan - Patient's Chronic Conditions and Co-Morbidity Symptoms are Monitored and Maintained or Improved: Monitor and assess patient's chronic conditions and comorbid symptoms for stability, deterioration, or improvement  5/16/2025 1022 by Alejandra Kim RN  Outcome: Progressing  Flowsheets (Taken 5/16/2025 0845)  Care Plan - Patient's Chronic Conditions and Co-Morbidity Symptoms are Monitored and Maintained or Improved: Monitor and assess patient's chronic conditions and comorbid symptoms for stability, deterioration, or improvement     Problem: Safety - Adult  Goal: Free from fall injury  5/16/2025 1022 by Alejandra Kim RN  Outcome: Progressing  Flowsheets (Taken 5/12/2025 2102 by Shawnee Pearson, RN)  Free From Fall Injury:   Instruct family/caregiver on patient safety   Based on caregiver fall risk screen, instruct family/caregiver to ask for assistance with transferring infant if caregiver noted to have fall risk factors  5/16/2025 1022 by Alejandra Kim RN  Outcome: Progressing     Problem: Respiratory - Adult  Goal: Achieves optimal ventilation and oxygenation  5/16/2025 1022 by Alejandra Kim RN  Outcome: Progressing  Flowsheets (Taken 5/16/2025 0845)  Achieves optimal ventilation and oxygenation: Assess for changes in respiratory status  5/16/2025 1022 by Alejandra Kim RN  Outcome: Progressing  5/16/2025 0914 by Elvia Garcia RCP  Outcome: Progressing  Flowsheets (Taken 5/16/2025 0845 by Alejandra Kim, RN)  Achieves optimal ventilation and oxygenation: Assess for changes in respiratory status  5/15/2025 2037 by Justine Roberts RCP  Outcome: Progressing     Problem: Pain  Goal: Verbalizes/displays adequate comfort level or baseline comfort level  5/16/2025 
  Problem: Chronic Conditions and Co-morbidities  Goal: Patient's chronic conditions and co-morbidity symptoms are monitored and maintained or improved  Outcome: Progressing     Problem: Discharge Planning  Goal: Discharge to home or other facility with appropriate resources  Outcome: Progressing     Problem: Safety - Adult  Goal: Free from fall injury  Outcome: Progressing     Problem: Respiratory - Adult  Goal: Lung sounds clear or within normal limits for patient  Outcome: Progressing  Goal: Achieves optimal ventilation and oxygenation  Outcome: Progressing  Flowsheets (Taken 5/15/2025 0903 by Dilan Welch RCP)  Achieves optimal ventilation and oxygenation: Assess for changes in respiratory status     Problem: Pain  Goal: Verbalizes/displays adequate comfort level or baseline comfort level  Outcome: Progressing     Problem: Skin/Tissue Integrity  Goal: Skin integrity remains intact  Description: 1.  Monitor for areas of redness and/or skin breakdown2.  Assess vascular access sites hourly3.  Every 4-6 hours minimum:  Change oxygen saturation probe site4.  Every 4-6 hours:  If on nasal continuous positive airway pressure, respiratory therapy assess nares and determine need for appliance change or resting period  Outcome: Progressing     
  Problem: Discharge Planning  Goal: Discharge to home or other facility with appropriate resources  5/12/2025 1507 by Andra Maya, MSW, LSW  Note: Return to [facility], see SW notes 5/12/25.  5/12/2025 1025 by Delano Marquez  Outcome: Progressing  Note: Return [facility], social work following, ambulette transport     
  Problem: Discharge Planning  Goal: Discharge to home or other facility with appropriate resources  Outcome: Progressing  Note: Return [facility], social work following, ambulette transport     Problem: Safety - Adult  Goal: Free from fall injury  Outcome: Progressing  Note: No falls noted this shift. Continue falling star program. Bed alarm on, bed in low position. Call light and personal belongings in reach.  Patient uses call light appropriately.       Problem: Respiratory - Adult  Goal: Lung sounds clear or within normal limits for patient  5/12/2025 0923 by Dayanara Clark ABELARDO  Outcome: Progressing     Problem: Pain  Goal: Verbalizes/displays adequate comfort level or baseline comfort level  Outcome: Progressing  Note: Dilaudid scheduled q6, oxicodone q4 PRN, encourage rest and reposition     Problem: Skin/Tissue Integrity  Goal: Skin integrity remains intact  Description: 1.  Monitor for areas of redness and/or skin breakdown2.  Assess vascular access sites hourly3.  Every 4-6 hours minimum:  Change oxygen saturation probe site4.  Every 4-6 hours:  If on nasal continuous positive airway pressure, respiratory therapy assess nares and determine need for appliance change or resting period  Outcome: Progressing  Note: No new signs or symptoms of skin breakdown noted this shift, encouraging patient to turn and reposition self in bed q2h       
  Problem: Respiratory - Adult  Goal: Lung sounds clear or within normal limits for patient  5/13/2025 0816 by Hellen Thornton RCP  Outcome: Progressing  5/12/2025 2102 by Shawnee Pearson RN  Outcome: Progressing   Patient lung sounds are considered normal for their current lung condition. No signs of distress noted. Current treatment regimen appropriate   
  Problem: Respiratory - Adult  Goal: Lung sounds clear or within normal limits for patient  5/13/2025 2102 by Tracey Mead RCP  Outcome: Progressing     Patient mutually agreed on goals.  
  Problem: Respiratory - Adult  Goal: Lung sounds clear or within normal limits for patient  5/14/2025 0954 by Dilan Welch RCP  Outcome: Progressing  5/13/2025 2102 by Tracey Mead RCP  Outcome: Progressing  Goal: Achieves optimal ventilation and oxygenation  Outcome: Progressing     
  Problem: Respiratory - Adult  Goal: Lung sounds clear or within normal limits for patient  5/14/2025 2104 by Tracey Mead RCP  Outcome: Progressing     Problem: Respiratory - Adult  Goal: Achieves optimal ventilation and oxygenation  5/14/2025 2104 by Tracey Mead RCP  Outcome: Progressing     Patient mutually agreed on goals.  
  Problem: Respiratory - Adult  Goal: Lung sounds clear or within normal limits for patient  5/15/2025 2037 by Justine Roberts RCP  Outcome: Progressing     Problem: Respiratory - Adult  Goal: Achieves optimal ventilation and oxygenation  5/15/2025 2037 by Justine Roberts RCP  Outcome: Progressing   Patient lung sounds are considered normal for their current lung condition. No signs of distress noted. Current treatment regimen appropriate Patient mutually agreed on goals.    
  Problem: Respiratory - Adult  Goal: Lung sounds clear or within normal limits for patient  5/16/2025 0914 by Elvia Garcia RCP  Outcome: Progressing     Problem: Respiratory - Adult  Goal: Achieves optimal ventilation and oxygenation  5/16/2025 0914 by Elvia Garcia RCP  Outcome: Progressing   Continue therapy as ordered to achieve and maintain clear breath sounds and improve aeration.  
  Problem: Respiratory - Adult  Goal: Lung sounds clear or within normal limits for patient  Outcome: Progressing   Breath sounds diminished, continuing inhalers as ordered.  Patient mutually agreed on goals.      
Elli Coulter is a 69-year-old female who presented overnight with complaints of abdominal pain and diarrhea. GI panel positive for norovirus. Continue supportive care. Patient also noted to have metabolic acidosis. Will start bicarb drip and continue to monitor. Will repeat BMP in the evening. If worsens, will consider nephrology consult. Patient able to tolerate regular diet at this time. Nursing to document all stool color and consistency. Continue current management.  
vascular access sites hourly   Every 4-6 hours minimum:  Change oxygen saturation probe site   Every 4-6 hours:  If on nasal continuous positive airway pressure, assess nares and determine need for appliance change or resting period   Turn and reposition as indicated   Assess need for specialty bed   Positioning devices   Monitor skin under medical devices   Check visual cues for pain   Pressure redistribution bed/mattress (bed type)  5/12/2025 1025 by Delano Marquez  Outcome: Progressing  Note: No new signs or symptoms of skin breakdown noted this shift, encouraging patient to turn and reposition self in bed q2h

## 2025-05-27 ENCOUNTER — OFFICE VISIT (OUTPATIENT)
Dept: UROLOGY | Age: 70
End: 2025-05-27
Payer: MEDICARE

## 2025-05-27 VITALS — RESPIRATION RATE: 18 BRPM | HEIGHT: 64 IN | BODY MASS INDEX: 31.24 KG/M2 | WEIGHT: 183 LBS

## 2025-05-27 DIAGNOSIS — S37.019A PERINEPHRIC HEMATOMA: Primary | ICD-10-CM

## 2025-05-27 DIAGNOSIS — N39.0 RECURRENT UTI: ICD-10-CM

## 2025-05-27 DIAGNOSIS — N32.81 OAB (OVERACTIVE BLADDER): ICD-10-CM

## 2025-05-27 DIAGNOSIS — R18.8 RETROPERITONEAL FLUID COLLECTION: ICD-10-CM

## 2025-05-27 DIAGNOSIS — N20.0 NEPHROLITHIASIS: ICD-10-CM

## 2025-05-27 DIAGNOSIS — R35.0 URINARY FREQUENCY: ICD-10-CM

## 2025-05-27 DIAGNOSIS — N39.41 URGE INCONTINENCE OF URINE: ICD-10-CM

## 2025-05-27 DIAGNOSIS — N39.3 STRESS INCONTINENCE IN FEMALE: ICD-10-CM

## 2025-05-27 PROCEDURE — 1159F MED LIST DOCD IN RCRD: CPT | Performed by: NURSE PRACTITIONER

## 2025-05-27 PROCEDURE — G8400 PT W/DXA NO RESULTS DOC: HCPCS | Performed by: NURSE PRACTITIONER

## 2025-05-27 PROCEDURE — 1090F PRES/ABSN URINE INCON ASSESS: CPT | Performed by: NURSE PRACTITIONER

## 2025-05-27 PROCEDURE — 3017F COLORECTAL CA SCREEN DOC REV: CPT | Performed by: NURSE PRACTITIONER

## 2025-05-27 PROCEDURE — 1123F ACP DISCUSS/DSCN MKR DOCD: CPT | Performed by: NURSE PRACTITIONER

## 2025-05-27 PROCEDURE — 1036F TOBACCO NON-USER: CPT | Performed by: NURSE PRACTITIONER

## 2025-05-27 PROCEDURE — G8427 DOCREV CUR MEDS BY ELIG CLIN: HCPCS | Performed by: NURSE PRACTITIONER

## 2025-05-27 PROCEDURE — 0509F URINE INCON PLAN DOCD: CPT | Performed by: NURSE PRACTITIONER

## 2025-05-27 PROCEDURE — G8417 CALC BMI ABV UP PARAM F/U: HCPCS | Performed by: NURSE PRACTITIONER

## 2025-05-27 PROCEDURE — 99214 OFFICE O/P EST MOD 30 MIN: CPT | Performed by: NURSE PRACTITIONER

## 2025-05-27 PROCEDURE — 1111F DSCHRG MED/CURRENT MED MERGE: CPT | Performed by: NURSE PRACTITIONER

## 2025-05-27 RX ORDER — MIRABEGRON 50 MG/1
50 TABLET, FILM COATED, EXTENDED RELEASE ORAL DAILY
Qty: 90 TABLET | Refills: 2 | Status: ON HOLD | OUTPATIENT
Start: 2025-05-27 | End: 2025-08-25

## 2025-05-27 NOTE — PROGRESS NOTES
[facility] [place] [facility]  [facility]  [address]  [address]  [facility] [address]  Dept: [phone]  Loc: [phone]    Visit Date: 5/27/2025        HPI:     Elli Coulter is a 69 y.o. female who presents today for:  Chief Complaint   Patient presents with    Follow-up     From hospital       HPI  Patient presents to urology clinic for hospital follow-up.     Elli was seen in the hospital for low back pain and LLQ. She underwent a left nephrectomy 10/2024 by [facility]   Denies flank pain, suprapubic pressure, gross hematuria, dysuria, fever or chills.       Also with hx of kidney stones. Underwent cystoscopy left ureteroscopy, left holmium laser lithotripsy and stent exchange 1/12/23 with Dr. Lanier.      Hx of OAB and incontinence. She did have an interstim which was ultimately removed. She underwent cystoscopy with bladder botox 200 units by Dr. Lanier on 8/22/2023.       Current Outpatient Medications   Medication Sig Dispense Refill    mirabegron (MYRBETRIQ) 50 MG TB24 Take 50 mg by mouth daily 90 tablet 2    ferrous sulfate (IRON 325) 325 (65 Fe) MG tablet Take 1 tablet by mouth every other day  3    DULoxetine (CYMBALTA) 60 MG extended release capsule Take 1 capsule by mouth daily      ciprofloxacin (CIPRO) 500 MG tablet       rOPINIRole (REQUIP) 1 MG tablet Take 1 tablet by mouth nightly      acetaminophen (TYLENOL) 325 MG tablet Take 2 tablets by mouth every 4 hours as needed for Pain      oxyCODONE 5 MG capsule Take 1 capsule by mouth every 4 hours as needed for Pain.      nitroGLYCERIN (NITROSTAT) 0.4 MG SL tablet Place 1 tablet under the tongue every 15 minutes as needed for Chest pain up to max of 3 total doses. If no relief after 1 dose, call 911.      albuterol (PROVENTIL) (2.5 MG/3ML) 0.083% nebulizer solution Take 3 mLs by nebulization every 6 hours as needed for Wheezing      fluticasone-umeclidin-vilant (TRELEGY ELLIPTA) 100-62.5-25

## 2025-05-31 ENCOUNTER — APPOINTMENT (OUTPATIENT)
Dept: CT IMAGING | Age: 70
DRG: 920 | End: 2025-05-31
Payer: MEDICARE

## 2025-05-31 ENCOUNTER — HOSPITAL ENCOUNTER (INPATIENT)
Age: 70
LOS: 8 days | Discharge: NURSING, REHAB OR LONG-TERM CARE | DRG: 920 | End: 2025-06-08
Admitting: STUDENT IN AN ORGANIZED HEALTH CARE EDUCATION/TRAINING PROGRAM
Payer: MEDICARE

## 2025-05-31 DIAGNOSIS — R18.8 INTRA-ABDOMINAL FLUID COLLECTION: ICD-10-CM

## 2025-05-31 DIAGNOSIS — N30.00 ACUTE CYSTITIS WITHOUT HEMATURIA: Primary | ICD-10-CM

## 2025-05-31 DIAGNOSIS — R10.32 ABDOMINAL PAIN, LEFT LOWER QUADRANT: ICD-10-CM

## 2025-05-31 DIAGNOSIS — E87.1 HYPONATREMIA: ICD-10-CM

## 2025-05-31 PROBLEM — N15.1 RENAL ABSCESS, LEFT: Status: ACTIVE | Noted: 2025-05-31

## 2025-05-31 PROBLEM — N18.31 STAGE 3A CHRONIC KIDNEY DISEASE (HCC): Status: ACTIVE | Noted: 2025-05-31

## 2025-05-31 PROBLEM — E11.21 TYPE 2 DIABETES MELLITUS WITH DIABETIC NEPHROPATHY, WITH LONG-TERM CURRENT USE OF INSULIN (HCC): Status: ACTIVE | Noted: 2025-05-31

## 2025-05-31 PROBLEM — Z79.4 TYPE 2 DIABETES MELLITUS WITH DIABETIC NEPHROPATHY, WITH LONG-TERM CURRENT USE OF INSULIN (HCC): Status: ACTIVE | Noted: 2025-05-31

## 2025-05-31 PROBLEM — R30.0 DYSURIA: Status: ACTIVE | Noted: 2025-05-31

## 2025-05-31 LAB
ALBUMIN SERPL BCG-MCNC: 3 G/DL (ref 3.4–4.9)
ALP SERPL-CCNC: 77 U/L (ref 38–126)
ALT SERPL W/O P-5'-P-CCNC: 15 U/L (ref 10–35)
ANION GAP SERPL CALC-SCNC: 12 MEQ/L (ref 8–16)
AST SERPL-CCNC: 22 U/L (ref 10–35)
BACTERIA URNS QL MICRO: ABNORMAL /HPF
BASOPHILS ABSOLUTE: 0 THOU/MM3 (ref 0–0.1)
BASOPHILS NFR BLD AUTO: 0.3 %
BILIRUB SERPL-MCNC: 0.5 MG/DL (ref 0.3–1.2)
BILIRUB UR QL STRIP.AUTO: ABNORMAL
BUN SERPL-MCNC: 16 MG/DL (ref 8–23)
CALCIUM SERPL-MCNC: 9.6 MG/DL (ref 8.8–10.2)
CASTS #/AREA URNS LPF: ABNORMAL /LPF
CASTS 2: ABNORMAL /LPF
CHARACTER UR: ABNORMAL
CHLORIDE SERPL-SCNC: 94 MEQ/L (ref 98–111)
CO2 SERPL-SCNC: 20 MEQ/L (ref 22–29)
COLOR, UA: ABNORMAL
CREAT SERPL-MCNC: 0.9 MG/DL (ref 0.5–0.9)
CREAT UR-MCNC: 160 MG/DL
CRP SERPL-MCNC: 13.9 MG/DL (ref 0–0.5)
CRYSTALS URNS MICRO: ABNORMAL
DEPRECATED RDW RBC AUTO: 45.3 FL (ref 35–45)
EOSINOPHIL NFR BLD AUTO: 0.5 %
EOSINOPHILS ABSOLUTE: 0.1 THOU/MM3 (ref 0–0.4)
EPITHELIAL CELLS, UA: ABNORMAL /HPF
ERYTHROCYTE [DISTWIDTH] IN BLOOD BY AUTOMATED COUNT: 15.3 % (ref 11.5–14.5)
GFR SERPL CREATININE-BSD FRML MDRD: 70 ML/MIN/1.73M2
GLUCOSE SERPL-MCNC: 98 MG/DL (ref 74–109)
GLUCOSE UR QL STRIP.AUTO: NEGATIVE MG/DL
HCT VFR BLD AUTO: 32.9 % (ref 37–47)
HGB BLD-MCNC: 10.3 GM/DL (ref 12–16)
HGB UR QL STRIP.AUTO: NEGATIVE
IMM GRANULOCYTES # BLD AUTO: 0.14 THOU/MM3 (ref 0–0.07)
IMM GRANULOCYTES NFR BLD AUTO: 1.1 %
KETONES UR QL STRIP.AUTO: NEGATIVE
LACTIC ACID, SEPSIS: 0.8 MMOL/L (ref 0.5–1.9)
LIPASE SERPL-CCNC: 31 U/L (ref 13–60)
LYMPHOCYTES ABSOLUTE: 1.5 THOU/MM3 (ref 1–4.8)
LYMPHOCYTES NFR BLD AUTO: 12.6 %
MCH RBC QN AUTO: 25.4 PG (ref 26–33)
MCHC RBC AUTO-ENTMCNC: 31.3 GM/DL (ref 32.2–35.5)
MCV RBC AUTO: 81.2 FL (ref 81–99)
MISCELLANEOUS 2: ABNORMAL
MONOCYTES ABSOLUTE: 0.8 THOU/MM3 (ref 0.4–1.3)
MONOCYTES NFR BLD AUTO: 6.4 %
NEUTROPHILS ABSOLUTE: 9.7 THOU/MM3 (ref 1.8–7.7)
NEUTROPHILS NFR BLD AUTO: 79.1 %
NITRITE UR QL STRIP: NEGATIVE
NRBC BLD AUTO-RTO: 0 /100 WBC
OSMOLALITY SERPL CALC.SUM OF ELEC: 254.5 MOSMOL/KG (ref 275–300)
OSMOLALITY UR: 669 MOSMOL/KG (ref 250–750)
PH UR STRIP.AUTO: 6 [PH] (ref 5–9)
PLATELET # BLD AUTO: 453 THOU/MM3 (ref 130–400)
PMV BLD AUTO: 7.9 FL (ref 9.4–12.4)
POTASSIUM SERPL-SCNC: 4.7 MEQ/L (ref 3.5–5.2)
PROCALCITONIN SERPL IA-MCNC: 0.1 NG/ML (ref 0.01–0.09)
PROT SERPL-MCNC: 7 G/DL (ref 6.4–8.3)
PROT UR STRIP.AUTO-MCNC: NEGATIVE MG/DL
RBC # BLD AUTO: 4.05 MILL/MM3 (ref 4.2–5.4)
RBC URINE: ABNORMAL /HPF
RENAL EPI CELLS #/AREA URNS HPF: ABNORMAL /[HPF]
SODIUM SERPL-SCNC: 126 MEQ/L (ref 135–145)
SODIUM UR-SCNC: 59 MEQ/L
SP GR UR REFRACT.AUTO: 1.02 (ref 1–1.03)
UROBILINOGEN, URINE: 1 EU/DL (ref 0–1)
WBC # BLD AUTO: 12.3 THOU/MM3 (ref 4.8–10.8)
WBC #/AREA URNS HPF: ABNORMAL /HPF
WBC #/AREA URNS HPF: ABNORMAL /[HPF]
YEAST LIKE FUNGI URNS QL MICRO: ABNORMAL

## 2025-05-31 PROCEDURE — 96375 TX/PRO/DX INJ NEW DRUG ADDON: CPT

## 2025-05-31 PROCEDURE — 1200000000 HC SEMI PRIVATE

## 2025-05-31 PROCEDURE — 99223 1ST HOSP IP/OBS HIGH 75: CPT

## 2025-05-31 PROCEDURE — 83690 ASSAY OF LIPASE: CPT

## 2025-05-31 PROCEDURE — 83935 ASSAY OF URINE OSMOLALITY: CPT

## 2025-05-31 PROCEDURE — 87077 CULTURE AEROBIC IDENTIFY: CPT

## 2025-05-31 PROCEDURE — 83605 ASSAY OF LACTIC ACID: CPT

## 2025-05-31 PROCEDURE — 85025 COMPLETE CBC W/AUTO DIFF WBC: CPT

## 2025-05-31 PROCEDURE — 96374 THER/PROPH/DIAG INJ IV PUSH: CPT

## 2025-05-31 PROCEDURE — 0W9H30Z DRAINAGE OF RETROPERITONEUM WITH DRAINAGE DEVICE, PERCUTANEOUS APPROACH: ICD-10-PCS | Performed by: RADIOLOGY

## 2025-05-31 PROCEDURE — 84145 PROCALCITONIN (PCT): CPT

## 2025-05-31 PROCEDURE — 87186 SC STD MICRODIL/AGAR DIL: CPT

## 2025-05-31 PROCEDURE — 36415 COLL VENOUS BLD VENIPUNCTURE: CPT

## 2025-05-31 PROCEDURE — 84300 ASSAY OF URINE SODIUM: CPT

## 2025-05-31 PROCEDURE — 87086 URINE CULTURE/COLONY COUNT: CPT

## 2025-05-31 PROCEDURE — 2500000003 HC RX 250 WO HCPCS

## 2025-05-31 PROCEDURE — 74176 CT ABD & PELVIS W/O CONTRAST: CPT

## 2025-05-31 PROCEDURE — 86140 C-REACTIVE PROTEIN: CPT

## 2025-05-31 PROCEDURE — 2580000003 HC RX 258

## 2025-05-31 PROCEDURE — 6360000002 HC RX W HCPCS: Performed by: PHYSICIAN ASSISTANT

## 2025-05-31 PROCEDURE — 1200000003 HC TELEMETRY R&B

## 2025-05-31 PROCEDURE — 81001 URINALYSIS AUTO W/SCOPE: CPT

## 2025-05-31 PROCEDURE — 85651 RBC SED RATE NONAUTOMATED: CPT

## 2025-05-31 PROCEDURE — 2580000003 HC RX 258: Performed by: PHYSICIAN ASSISTANT

## 2025-05-31 PROCEDURE — 99285 EMERGENCY DEPT VISIT HI MDM: CPT

## 2025-05-31 PROCEDURE — 96376 TX/PRO/DX INJ SAME DRUG ADON: CPT

## 2025-05-31 PROCEDURE — 6360000002 HC RX W HCPCS: Performed by: NURSE PRACTITIONER

## 2025-05-31 PROCEDURE — 80053 COMPREHEN METABOLIC PANEL: CPT

## 2025-05-31 PROCEDURE — 82570 ASSAY OF URINE CREATININE: CPT

## 2025-05-31 RX ORDER — DULOXETIN HYDROCHLORIDE 60 MG/1
120 CAPSULE, DELAYED RELEASE ORAL DAILY
Status: DISCONTINUED | OUTPATIENT
Start: 2025-06-01 | End: 2025-06-08 | Stop reason: HOSPADM

## 2025-05-31 RX ORDER — GLUCAGON 1 MG/ML
1 KIT INJECTION PRN
Status: DISCONTINUED | OUTPATIENT
Start: 2025-05-31 | End: 2025-06-08 | Stop reason: HOSPADM

## 2025-05-31 RX ORDER — LEVOTHYROXINE SODIUM 75 UG/1
75 TABLET ORAL DAILY
Status: DISCONTINUED | OUTPATIENT
Start: 2025-06-02 | End: 2025-06-08 | Stop reason: HOSPADM

## 2025-05-31 RX ORDER — LEVOTHYROXINE SODIUM 150 UG/1
150 TABLET ORAL WEEKLY
Status: DISCONTINUED | OUTPATIENT
Start: 2025-06-01 | End: 2025-06-08 | Stop reason: HOSPADM

## 2025-05-31 RX ORDER — ATORVASTATIN CALCIUM 40 MG/1
40 TABLET, FILM COATED ORAL NIGHTLY
Status: DISCONTINUED | OUTPATIENT
Start: 2025-05-31 | End: 2025-06-08 | Stop reason: HOSPADM

## 2025-05-31 RX ORDER — MORPHINE SULFATE 10 MG/ML
6 INJECTION, SOLUTION INTRAMUSCULAR; INTRAVENOUS ONCE
Status: COMPLETED | OUTPATIENT
Start: 2025-05-31 | End: 2025-05-31

## 2025-05-31 RX ORDER — INSULIN GLARGINE 100 [IU]/ML
10 INJECTION, SOLUTION SUBCUTANEOUS NIGHTLY
Status: DISCONTINUED | OUTPATIENT
Start: 2025-06-01 | End: 2025-06-08 | Stop reason: HOSPADM

## 2025-05-31 RX ORDER — OXYCODONE HYDROCHLORIDE 5 MG/1
5 TABLET ORAL EVERY 4 HOURS PRN
Refills: 0 | Status: DISCONTINUED | OUTPATIENT
Start: 2025-05-31 | End: 2025-06-03

## 2025-05-31 RX ORDER — ALBUTEROL SULFATE 0.83 MG/ML
2.5 SOLUTION RESPIRATORY (INHALATION) EVERY 6 HOURS PRN
Status: DISCONTINUED | OUTPATIENT
Start: 2025-05-31 | End: 2025-06-03

## 2025-05-31 RX ORDER — SODIUM CHLORIDE 0.9 % (FLUSH) 0.9 %
5-40 SYRINGE (ML) INJECTION PRN
Status: DISCONTINUED | OUTPATIENT
Start: 2025-05-31 | End: 2025-06-08 | Stop reason: HOSPADM

## 2025-05-31 RX ORDER — SODIUM CHLORIDE 9 MG/ML
INJECTION, SOLUTION INTRAVENOUS CONTINUOUS
Status: ACTIVE | OUTPATIENT
Start: 2025-05-31 | End: 2025-06-01

## 2025-05-31 RX ORDER — ROPINIROLE 1 MG/1
1 TABLET, FILM COATED ORAL NIGHTLY
Status: DISCONTINUED | OUTPATIENT
Start: 2025-05-31 | End: 2025-06-08 | Stop reason: HOSPADM

## 2025-05-31 RX ORDER — DEXTROSE MONOHYDRATE 100 MG/ML
INJECTION, SOLUTION INTRAVENOUS CONTINUOUS PRN
Status: DISCONTINUED | OUTPATIENT
Start: 2025-05-31 | End: 2025-06-08 | Stop reason: HOSPADM

## 2025-05-31 RX ORDER — AMLODIPINE BESYLATE 5 MG/1
5 TABLET ORAL 2 TIMES DAILY
Status: DISCONTINUED | OUTPATIENT
Start: 2025-05-31 | End: 2025-06-08 | Stop reason: HOSPADM

## 2025-05-31 RX ORDER — BUPROPION HYDROCHLORIDE 300 MG/1
300 TABLET ORAL EVERY MORNING
Status: DISCONTINUED | OUTPATIENT
Start: 2025-06-01 | End: 2025-06-08 | Stop reason: HOSPADM

## 2025-05-31 RX ORDER — CARVEDILOL 3.12 MG/1
3.12 TABLET ORAL 2 TIMES DAILY WITH MEALS
Status: DISCONTINUED | OUTPATIENT
Start: 2025-06-01 | End: 2025-06-08 | Stop reason: HOSPADM

## 2025-05-31 RX ORDER — ONDANSETRON 2 MG/ML
4 INJECTION INTRAMUSCULAR; INTRAVENOUS ONCE
Status: COMPLETED | OUTPATIENT
Start: 2025-05-31 | End: 2025-05-31

## 2025-05-31 RX ORDER — DOCUSATE SODIUM 100 MG/1
100 CAPSULE, LIQUID FILLED ORAL 2 TIMES DAILY
Status: DISCONTINUED | OUTPATIENT
Start: 2025-05-31 | End: 2025-06-08 | Stop reason: HOSPADM

## 2025-05-31 RX ORDER — TROSPIUM CHLORIDE 20 MG/1
20 TABLET, FILM COATED ORAL
Status: DISCONTINUED | OUTPATIENT
Start: 2025-06-01 | End: 2025-06-08 | Stop reason: HOSPADM

## 2025-05-31 RX ORDER — ACETAMINOPHEN 650 MG/1
650 SUPPOSITORY RECTAL EVERY 6 HOURS PRN
Status: DISCONTINUED | OUTPATIENT
Start: 2025-05-31 | End: 2025-06-08 | Stop reason: HOSPADM

## 2025-05-31 RX ORDER — SODIUM CHLORIDE 9 MG/ML
INJECTION, SOLUTION INTRAVENOUS PRN
Status: DISCONTINUED | OUTPATIENT
Start: 2025-05-31 | End: 2025-06-08 | Stop reason: HOSPADM

## 2025-05-31 RX ORDER — PANTOPRAZOLE SODIUM 40 MG/1
40 TABLET, DELAYED RELEASE ORAL
Status: DISCONTINUED | OUTPATIENT
Start: 2025-06-01 | End: 2025-06-08 | Stop reason: HOSPADM

## 2025-05-31 RX ORDER — TRAZODONE HYDROCHLORIDE 100 MG/1
200 TABLET ORAL NIGHTLY
Status: DISCONTINUED | OUTPATIENT
Start: 2025-05-31 | End: 2025-06-08 | Stop reason: HOSPADM

## 2025-05-31 RX ORDER — LEVOFLOXACIN 5 MG/ML
750 INJECTION, SOLUTION INTRAVENOUS ONCE
Status: DISCONTINUED | OUTPATIENT
Start: 2025-05-31 | End: 2025-05-31

## 2025-05-31 RX ORDER — INSULIN LISPRO 100 [IU]/ML
0-8 INJECTION, SOLUTION INTRAVENOUS; SUBCUTANEOUS
Status: DISCONTINUED | OUTPATIENT
Start: 2025-05-31 | End: 2025-06-08 | Stop reason: HOSPADM

## 2025-05-31 RX ORDER — FENOFIBRATE 160 MG/1
160 TABLET ORAL DAILY
Status: DISCONTINUED | OUTPATIENT
Start: 2025-06-01 | End: 2025-06-08 | Stop reason: HOSPADM

## 2025-05-31 RX ORDER — LORATADINE 10 MG/1
10 TABLET ORAL DAILY
COMMUNITY

## 2025-05-31 RX ORDER — SODIUM CHLORIDE 0.9 % (FLUSH) 0.9 %
5-40 SYRINGE (ML) INJECTION EVERY 12 HOURS SCHEDULED
Status: DISCONTINUED | OUTPATIENT
Start: 2025-05-31 | End: 2025-06-08 | Stop reason: HOSPADM

## 2025-05-31 RX ORDER — MORPHINE SULFATE 2 MG/ML
2 INJECTION, SOLUTION INTRAMUSCULAR; INTRAVENOUS
Status: DISCONTINUED | OUTPATIENT
Start: 2025-05-31 | End: 2025-06-03

## 2025-05-31 RX ORDER — DIVALPROEX SODIUM 125 MG/1
125 CAPSULE, COATED PELLETS ORAL 2 TIMES DAILY
Status: DISCONTINUED | OUTPATIENT
Start: 2025-05-31 | End: 2025-06-08 | Stop reason: HOSPADM

## 2025-05-31 RX ORDER — LATANOPROST 50 UG/ML
1 SOLUTION/ DROPS OPHTHALMIC NIGHTLY
Status: DISCONTINUED | OUTPATIENT
Start: 2025-05-31 | End: 2025-06-08 | Stop reason: HOSPADM

## 2025-05-31 RX ORDER — ACETAMINOPHEN 325 MG/1
650 TABLET ORAL EVERY 6 HOURS PRN
Status: DISCONTINUED | OUTPATIENT
Start: 2025-05-31 | End: 2025-06-08 | Stop reason: HOSPADM

## 2025-05-31 RX ORDER — FERROUS SULFATE 325(65) MG
325 TABLET ORAL EVERY OTHER DAY
Status: DISCONTINUED | OUTPATIENT
Start: 2025-06-02 | End: 2025-06-08 | Stop reason: HOSPADM

## 2025-05-31 RX ORDER — MORPHINE SULFATE 2 MG/ML
1 INJECTION, SOLUTION INTRAMUSCULAR; INTRAVENOUS
Status: DISCONTINUED | OUTPATIENT
Start: 2025-05-31 | End: 2025-06-03

## 2025-05-31 RX ORDER — MORPHINE SULFATE 4 MG/ML
4 INJECTION, SOLUTION INTRAMUSCULAR; INTRAVENOUS ONCE
Refills: 0 | Status: COMPLETED | OUTPATIENT
Start: 2025-05-31 | End: 2025-05-31

## 2025-05-31 RX ORDER — ENOXAPARIN SODIUM 100 MG/ML
40 INJECTION SUBCUTANEOUS DAILY
Status: DISCONTINUED | OUTPATIENT
Start: 2025-06-01 | End: 2025-06-08 | Stop reason: HOSPADM

## 2025-05-31 RX ORDER — BUDESONIDE AND FORMOTEROL FUMARATE DIHYDRATE 80; 4.5 UG/1; UG/1
2 AEROSOL RESPIRATORY (INHALATION)
Status: DISCONTINUED | OUTPATIENT
Start: 2025-05-31 | End: 2025-06-08 | Stop reason: HOSPADM

## 2025-05-31 RX ADMIN — SODIUM CHLORIDE, PRESERVATIVE FREE 10 ML: 5 INJECTION INTRAVENOUS at 21:39

## 2025-05-31 RX ADMIN — PIPERACILLIN AND TAZOBACTAM 4500 MG: 4; .5 INJECTION, POWDER, LYOPHILIZED, FOR SOLUTION INTRAVENOUS at 20:09

## 2025-05-31 RX ADMIN — ONDANSETRON 4 MG: 2 INJECTION, SOLUTION INTRAMUSCULAR; INTRAVENOUS at 17:00

## 2025-05-31 RX ADMIN — MORPHINE SULFATE 6 MG: 10 INJECTION, SOLUTION INTRAMUSCULAR; INTRAVENOUS at 18:40

## 2025-05-31 RX ADMIN — MORPHINE SULFATE 4 MG: 4 INJECTION, SOLUTION INTRAMUSCULAR; INTRAVENOUS at 17:00

## 2025-05-31 RX ADMIN — SODIUM CHLORIDE: 0.9 INJECTION, SOLUTION INTRAVENOUS at 21:43

## 2025-05-31 ASSESSMENT — PAIN DESCRIPTION - DIRECTION: RADIATING_TOWARDS: BACK

## 2025-05-31 ASSESSMENT — PAIN DESCRIPTION - ORIENTATION
ORIENTATION: LEFT
ORIENTATION: LEFT

## 2025-05-31 ASSESSMENT — PAIN - FUNCTIONAL ASSESSMENT
PAIN_FUNCTIONAL_ASSESSMENT: 0-10

## 2025-05-31 ASSESSMENT — PAIN SCALES - GENERAL
PAINLEVEL_OUTOF10: 7
PAINLEVEL_OUTOF10: 8
PAINLEVEL_OUTOF10: 9
PAINLEVEL_OUTOF10: 10

## 2025-05-31 ASSESSMENT — PAIN DESCRIPTION - LOCATION
LOCATION: FLANK
LOCATION: ABDOMEN;GENERALIZED

## 2025-05-31 ASSESSMENT — PAIN DESCRIPTION - DESCRIPTORS: DESCRIPTORS: SHOOTING

## 2025-05-31 NOTE — ED TRIAGE NOTES
Pt presents to the ED by EMS form [facility] with c/c pain. Pt reports having jayce removed in February 2025 and since then has been dealing with intermittent pain in left lower region. Pt on pain regimen at home that she reports is not helping. Pt reports she was diagnosed with phantom pain. Vitals stable.

## 2025-05-31 NOTE — H&P
Lithotripsy Basket Retrieval of Stone Fragments possible Left Ureteral Stent performed by Sami Lanier MD at [facility] OR       Home Medications:   No current facility-administered medications on file prior to encounter.     Current Outpatient Medications on File Prior to Encounter   Medication Sig Dispense Refill    mirabegron (MYRBETRIQ) 50 MG TB24 Take 50 mg by mouth daily 90 tablet 2    ferrous sulfate (IRON 325) 325 (65 Fe) MG tablet Take 1 tablet by mouth every other day  3    DULoxetine (CYMBALTA) 60 MG extended release capsule Take 1 capsule by mouth daily      ciprofloxacin (CIPRO) 500 MG tablet       rOPINIRole (REQUIP) 1 MG tablet Take 1 tablet by mouth nightly      acetaminophen (TYLENOL) 325 MG tablet Take 2 tablets by mouth every 4 hours as needed for Pain      oxyCODONE 5 MG capsule Take 1 capsule by mouth every 4 hours as needed for Pain.      nitroGLYCERIN (NITROSTAT) 0.4 MG SL tablet Place 1 tablet under the tongue every 15 minutes as needed for Chest pain up to max of 3 total doses. If no relief after 1 dose, call 911.      albuterol (PROVENTIL) (2.5 MG/3ML) 0.083% nebulizer solution Take 3 mLs by nebulization every 6 hours as needed for Wheezing      fluticasone-umeclidin-vilant (TRELEGY ELLIPTA) 100-62.5-25 MCG/ACT AEPB inhaler Inhale 1 puff into the lungs daily      divalproex (DEPAKOTE) 125 MG DR tablet Take 1 tablet by mouth 2 times daily      buPROPion (WELLBUTRIN XL) 150 MG extended release tablet Take 2 tablets by mouth every morning      escitalopram (LEXAPRO) 20 MG tablet Take 1 tablet by mouth daily (Patient taking differently: Take 0.5 tablets by mouth daily)      traZODone (DESYREL) 100 MG tablet Take 2 tablets by mouth nightly      insulin glargine (LANTUS SOLOSTAR) 100 UNIT/ML injection pen Inject 10 Units into the skin nightly      insulin lispro (HUMALOG) 100 UNIT/ML injection cartridge Inject 0-10 Units into the skin 4 times daily (before meals and nightly) Inject per sliding scale

## 2025-05-31 NOTE — ED PROVIDER NOTES
[facility] EMERGENCY DEPARTMENT      EMERGENCY MEDICINE     Pt Name: Elli Coulter  MRN: [id number]  Birthdate 1955  Date of evaluation: 5/31/2025  Provider: BA De Leon CNP    CHIEF COMPLAINT       Chief Complaint   Patient presents with    Pain     HISTORY OF PRESENT ILLNESS   Elli Coulter is a pleasant 69 y.o. female with a past medical history of obesity, COPD, DVT, PE, left nephrectomy in February of this year at a facility in [place].  She presents with complaint of approximately 1 weeks worth of gradually worsening left flank and left lower quadrant abdominal pain along with dark-colored urine and decreased frequency of urination.  No modifying factors for her abdominal pain.  She denies any fever or chills.  No accompanying nausea, vomiting, diarrhea, or constipation    PASTMEDICAL HISTORY     Past Medical History:   Diagnosis Date    Acute cystitis with hematuria 01/19/2024    Acute encephalopathy 01/10/2024    Alcohol dependence in remission ([facility])     Allergic rhinitis     Arthritis     back, arms, hips    Bacteriuria 10/13/2024    Bipolar 1 disorder ([facility])     Cancer ([facility]) 2011    cancerous polyps removed - Dr. Silva    Cannabis abuse     Cataract     Cellulitis 02/2025    left side with drain after kidney removal L side    Change of, skin texture 04/21/2014    Cocaine abuse ([facility])     Colon polyps     COPD (chronic obstructive pulmonary disease) ([facility]) 07/24/2014    Coronary vasospasm 08/17/2019    Depression     Disorder associated with Helicobacter species 04/27/2012    Diverticulitis of colon     Endometriosis 12/11/2019    Erosive esophagitis 07/06/2015    Erosive gastritis 10/10/2015    GERD (gastroesophageal reflux disease)     Glaucoma     H pylori ulcer 10/10/2015    Hearing loss     Hiatal hernia     History of arterial ischemic stroke 07/20/2019    History of colonoscopy 2002    History of DVT (deep vein thrombosis)     History of Helicobacter pylori infection     
NP pending imaging studies, antibiotics, and final disposition likely admission.  CT scan was concerning for a 7.8 x 4.3 x 8.4 cm fluid collection along the left renal fossa located primarily in the lateral left psoas.  Her urine was concerning for UTI, white count was 12.3, and sodium 126.  Patient was remedicated with morphine 6 mg IV, maintenance fluids, and antibiotics.  In review of her old records prior fluid collection grew out Proteus Mirabilis which was resistant to Cipro and intermediate to Levaquin so Zosyn was administered.  The patient remained stable with good vital signs.  Bette Ohara NP was consulted and graciously accepted admission.  The patient was admitted to the hospital in fair condition.    FINAL IMPRESSION      1. Acute cystitis without hematuria    2. Abdominal pain, left lower quadrant    3. Intra-abdominal fluid collection    4. Hyponatremia        Care of this patient was transferred from El [place]ummer NP to myself at shift change.    (Please note that portions of this note were completed with a voice recognition program.  Efforts were made to edit the dictations but occasionally words are mis-transcribed.)    Libia Orlando PA-C 5/31/25 7:38 PM       Libia Orlando PA-C  05/31/25 1938

## 2025-06-01 LAB
ANION GAP SERPL CALC-SCNC: 10 MEQ/L (ref 8–16)
APTT PPP: 29.8 SECONDS (ref 22–38)
BASOPHILS ABSOLUTE: 0 THOU/MM3 (ref 0–0.1)
BASOPHILS NFR BLD AUTO: 0.4 %
BUN SERPL-MCNC: 15 MG/DL (ref 8–23)
CALCIUM SERPL-MCNC: 9.3 MG/DL (ref 8.8–10.2)
CHLORIDE SERPL-SCNC: 98 MEQ/L (ref 98–111)
CO2 SERPL-SCNC: 21 MEQ/L (ref 22–29)
CREAT SERPL-MCNC: 0.9 MG/DL (ref 0.5–0.9)
DEPRECATED RDW RBC AUTO: 46.1 FL (ref 35–45)
EOSINOPHIL NFR BLD AUTO: 0.9 %
EOSINOPHILS ABSOLUTE: 0.1 THOU/MM3 (ref 0–0.4)
ERYTHROCYTE [DISTWIDTH] IN BLOOD BY AUTOMATED COUNT: 15.2 % (ref 11.5–14.5)
ERYTHROCYTE [SEDIMENTATION RATE] IN BLOOD BY WESTERGREN METHOD: 89 MM/HR (ref 0–20)
GFR SERPL CREATININE-BSD FRML MDRD: 70 ML/MIN/1.73M2
GLUCOSE BLD STRIP.AUTO-MCNC: 107 MG/DL (ref 70–108)
GLUCOSE BLD STRIP.AUTO-MCNC: 108 MG/DL (ref 70–108)
GLUCOSE BLD STRIP.AUTO-MCNC: 114 MG/DL (ref 70–108)
GLUCOSE BLD STRIP.AUTO-MCNC: 127 MG/DL (ref 70–108)
GLUCOSE BLD STRIP.AUTO-MCNC: 173 MG/DL (ref 70–108)
GLUCOSE SERPL-MCNC: 137 MG/DL (ref 74–109)
HCT VFR BLD AUTO: 32.3 % (ref 37–47)
HGB BLD-MCNC: 10 GM/DL (ref 12–16)
IMM GRANULOCYTES # BLD AUTO: 0.16 THOU/MM3 (ref 0–0.07)
IMM GRANULOCYTES NFR BLD AUTO: 1.4 %
INR PPP: 1.37 (ref 0.85–1.13)
LYMPHOCYTES ABSOLUTE: 1.1 THOU/MM3 (ref 1–4.8)
LYMPHOCYTES NFR BLD AUTO: 9.9 %
MCH RBC QN AUTO: 25.4 PG (ref 26–33)
MCHC RBC AUTO-ENTMCNC: 31 GM/DL (ref 32.2–35.5)
MCV RBC AUTO: 82.2 FL (ref 81–99)
MONOCYTES ABSOLUTE: 0.8 THOU/MM3 (ref 0.4–1.3)
MONOCYTES NFR BLD AUTO: 7 %
NEUTROPHILS ABSOLUTE: 9.2 THOU/MM3 (ref 1.8–7.7)
NEUTROPHILS NFR BLD AUTO: 80.4 %
NRBC BLD AUTO-RTO: 0 /100 WBC
OSMOLALITY SERPL CALC.SUM OF ELEC: 261.9 MOSMOL/KG (ref 275–300)
PLATELET # BLD AUTO: 398 THOU/MM3 (ref 130–400)
PMV BLD AUTO: 7.9 FL (ref 9.4–12.4)
POTASSIUM SERPL-SCNC: 4.4 MEQ/L (ref 3.5–5.2)
RBC # BLD AUTO: 3.93 MILL/MM3 (ref 4.2–5.4)
SODIUM SERPL-SCNC: 129 MEQ/L (ref 135–145)
SODIUM SERPL-SCNC: 130 MEQ/L (ref 135–145)
WBC # BLD AUTO: 11.4 THOU/MM3 (ref 4.8–10.8)

## 2025-06-01 PROCEDURE — 6360000002 HC RX W HCPCS

## 2025-06-01 PROCEDURE — 85610 PROTHROMBIN TIME: CPT

## 2025-06-01 PROCEDURE — 99222 1ST HOSP IP/OBS MODERATE 55: CPT | Performed by: NURSE PRACTITIONER

## 2025-06-01 PROCEDURE — 1200000003 HC TELEMETRY R&B

## 2025-06-01 PROCEDURE — 6370000000 HC RX 637 (ALT 250 FOR IP)

## 2025-06-01 PROCEDURE — 84295 ASSAY OF SERUM SODIUM: CPT

## 2025-06-01 PROCEDURE — 80048 BASIC METABOLIC PNL TOTAL CA: CPT

## 2025-06-01 PROCEDURE — 85730 THROMBOPLASTIN TIME PARTIAL: CPT

## 2025-06-01 PROCEDURE — 36415 COLL VENOUS BLD VENIPUNCTURE: CPT

## 2025-06-01 PROCEDURE — 94761 N-INVAS EAR/PLS OXIMETRY MLT: CPT

## 2025-06-01 PROCEDURE — 2580000003 HC RX 258

## 2025-06-01 PROCEDURE — 94640 AIRWAY INHALATION TREATMENT: CPT

## 2025-06-01 PROCEDURE — 82948 REAGENT STRIP/BLOOD GLUCOSE: CPT

## 2025-06-01 PROCEDURE — 85025 COMPLETE CBC W/AUTO DIFF WBC: CPT

## 2025-06-01 PROCEDURE — 1200000000 HC SEMI PRIVATE

## 2025-06-01 RX ADMIN — OXYCODONE HYDROCHLORIDE 5 MG: 5 TABLET ORAL at 20:36

## 2025-06-01 RX ADMIN — PIPERACILLIN AND TAZOBACTAM 3.38 MG: 3; .375 INJECTION, POWDER, FOR SOLUTION INTRAVENOUS at 03:26

## 2025-06-01 RX ADMIN — OXYCODONE HYDROCHLORIDE 5 MG: 5 TABLET ORAL at 16:04

## 2025-06-01 RX ADMIN — BUDESONIDE AND FORMOTEROL FUMARATE DIHYDRATE 2 PUFF: 80; 4.5 AEROSOL RESPIRATORY (INHALATION) at 09:49

## 2025-06-01 RX ADMIN — DULOXETINE 120 MG: 60 CAPSULE, DELAYED RELEASE ORAL at 08:28

## 2025-06-01 RX ADMIN — LATANOPROST 1 DROP: 50 SOLUTION OPHTHALMIC at 20:36

## 2025-06-01 RX ADMIN — SODIUM CHLORIDE: 0.9 INJECTION, SOLUTION INTRAVENOUS at 11:37

## 2025-06-01 RX ADMIN — ROPINIROLE HYDROCHLORIDE 1 MG: 1 TABLET, FILM COATED ORAL at 20:36

## 2025-06-01 RX ADMIN — ROPINIROLE HYDROCHLORIDE 1 MG: 1 TABLET, FILM COATED ORAL at 00:15

## 2025-06-01 RX ADMIN — LATANOPROST 1 DROP: 50 SOLUTION OPHTHALMIC at 00:17

## 2025-06-01 RX ADMIN — DIVALPROEX SODIUM 125 MG: 125 CAPSULE ORAL at 20:36

## 2025-06-01 RX ADMIN — PANTOPRAZOLE SODIUM 40 MG: 40 TABLET, DELAYED RELEASE ORAL at 06:19

## 2025-06-01 RX ADMIN — AMLODIPINE BESYLATE 5 MG: 5 TABLET ORAL at 20:36

## 2025-06-01 RX ADMIN — PANTOPRAZOLE SODIUM 40 MG: 40 TABLET, DELAYED RELEASE ORAL at 16:04

## 2025-06-01 RX ADMIN — TROSPIUM CHLORIDE 20 MG: 20 TABLET, FILM COATED ORAL at 16:04

## 2025-06-01 RX ADMIN — PIPERACILLIN SODIUM AND TAZOBACTAM SODIUM 3375 MG: 3; .375 INJECTION, POWDER, LYOPHILIZED, FOR SOLUTION INTRAVENOUS at 11:56

## 2025-06-01 RX ADMIN — DIVALPROEX SODIUM 125 MG: 125 CAPSULE ORAL at 00:14

## 2025-06-01 RX ADMIN — DIVALPROEX SODIUM 125 MG: 125 CAPSULE ORAL at 08:28

## 2025-06-01 RX ADMIN — BUPROPION HYDROCHLORIDE 300 MG: 300 TABLET, EXTENDED RELEASE ORAL at 08:28

## 2025-06-01 RX ADMIN — TRAZODONE HYDROCHLORIDE 200 MG: 100 TABLET ORAL at 20:36

## 2025-06-01 RX ADMIN — DOCUSATE SODIUM 100 MG: 100 CAPSULE, LIQUID FILLED ORAL at 20:36

## 2025-06-01 RX ADMIN — MORPHINE SULFATE 2 MG: 2 INJECTION, SOLUTION INTRAMUSCULAR; INTRAVENOUS at 22:17

## 2025-06-01 RX ADMIN — ATORVASTATIN CALCIUM 40 MG: 40 TABLET, FILM COATED ORAL at 00:15

## 2025-06-01 RX ADMIN — TIOTROPIUM BROMIDE INHALATION SPRAY 2 PUFF: 3.12 SPRAY, METERED RESPIRATORY (INHALATION) at 09:48

## 2025-06-01 RX ADMIN — OXYCODONE HYDROCHLORIDE 5 MG: 5 TABLET ORAL at 11:43

## 2025-06-01 RX ADMIN — MORPHINE SULFATE 2 MG: 2 INJECTION, SOLUTION INTRAMUSCULAR; INTRAVENOUS at 04:03

## 2025-06-01 RX ADMIN — ATORVASTATIN CALCIUM 40 MG: 40 TABLET, FILM COATED ORAL at 20:36

## 2025-06-01 RX ADMIN — AMLODIPINE BESYLATE 5 MG: 5 TABLET ORAL at 00:14

## 2025-06-01 RX ADMIN — MORPHINE SULFATE 2 MG: 2 INJECTION, SOLUTION INTRAMUSCULAR; INTRAVENOUS at 00:16

## 2025-06-01 RX ADMIN — TROSPIUM CHLORIDE 20 MG: 20 TABLET, FILM COATED ORAL at 06:19

## 2025-06-01 RX ADMIN — ENOXAPARIN SODIUM 40 MG: 100 INJECTION SUBCUTANEOUS at 08:28

## 2025-06-01 RX ADMIN — TRAZODONE HYDROCHLORIDE 200 MG: 100 TABLET ORAL at 00:15

## 2025-06-01 RX ADMIN — LEVOTHYROXINE SODIUM 150 MCG: 0.15 TABLET ORAL at 06:19

## 2025-06-01 RX ADMIN — DOCUSATE SODIUM 100 MG: 100 CAPSULE, LIQUID FILLED ORAL at 08:28

## 2025-06-01 RX ADMIN — PIPERACILLIN SODIUM AND TAZOBACTAM SODIUM 3375 MG: 3; .375 INJECTION, POWDER, LYOPHILIZED, FOR SOLUTION INTRAVENOUS at 20:31

## 2025-06-01 RX ADMIN — INSULIN GLARGINE 10 UNITS: 100 INJECTION, SOLUTION SUBCUTANEOUS at 20:35

## 2025-06-01 RX ADMIN — FENOFIBRATE 160 MG: 160 TABLET ORAL at 08:28

## 2025-06-01 ASSESSMENT — PAIN DESCRIPTION - DESCRIPTORS
DESCRIPTORS: SHOOTING
DESCRIPTORS: SHARP
DESCRIPTORS: SHARP
DESCRIPTORS: SHOOTING
DESCRIPTORS: SHARP
DESCRIPTORS: SHARP;STABBING

## 2025-06-01 ASSESSMENT — PAIN SCALES - GENERAL
PAINLEVEL_OUTOF10: 10
PAINLEVEL_OUTOF10: 8
PAINLEVEL_OUTOF10: 8
PAINLEVEL_OUTOF10: 10
PAINLEVEL_OUTOF10: 9
PAINLEVEL_OUTOF10: 9
PAINLEVEL_OUTOF10: 10
PAINLEVEL_OUTOF10: 8
PAINLEVEL_OUTOF10: 7

## 2025-06-01 ASSESSMENT — PAIN DESCRIPTION - LOCATION
LOCATION: FLANK;BACK
LOCATION: BACK;FLANK
LOCATION: FLANK
LOCATION: BACK;FLANK
LOCATION: FLANK
LOCATION: ABDOMEN;BACK

## 2025-06-01 ASSESSMENT — PAIN DESCRIPTION - ORIENTATION
ORIENTATION: LEFT
ORIENTATION: LEFT;LOWER
ORIENTATION: LEFT
ORIENTATION: LEFT

## 2025-06-01 NOTE — ED NOTES
Pt resting on cot and medicated per MAR. Admitting doctor at bedside. Respirations even and unlabored. Call light within reach. Will monitor. No further needs voiced.

## 2025-06-01 NOTE — ED NOTES
ED to inpatient nurses report    Chief Complaint   Patient presents with    Pain      Present to ED from [facility]   LOC: alert and orientated to name, place, date  Vital signs   Vitals:    05/31/25 1602 05/31/25 1657 05/31/25 1842 05/31/25 2015   BP: 130/64 138/79 (!) 152/70 132/63   Pulse: 57 58 66 62   Resp: 18 18 19 17   Temp: 97.4 °F (36.3 °C)      TempSrc: Oral      SpO2: 96% 94% 98% 90%   Weight: 77.1 kg (170 lb)      Height: 1.626 m (5' 4\")         Oxygen Baseline RA    Current needs required RA Bipap/Cpap No  LDAs:   Peripheral IV 05/31/25 Left Forearm (Active)   Site Assessment Clean, dry & intact 05/31/25 2015   Line Status Infusing 05/31/25 2015   Phlebitis Assessment No symptoms 05/31/25 2015   Infiltration Assessment 0 05/31/25 2015   Dressing Status Clean, dry & intact 05/31/25 2015   Dressing Type Transparent 05/31/25 2015     Mobility: Requires assistance * 1  Pending ED orders: None    Present condition: Stable     C-SSRS Risk of Suicide: No Risk  Swallow Screening    Preferred Language: English     Electronically signed by Dominga Archibald RN on 5/31/2025 at 8:32 PM

## 2025-06-02 ENCOUNTER — APPOINTMENT (OUTPATIENT)
Dept: CT IMAGING | Age: 70
DRG: 920 | End: 2025-06-02
Payer: MEDICARE

## 2025-06-02 LAB
ANION GAP SERPL CALC-SCNC: 10 MEQ/L (ref 8–16)
BUN SERPL-MCNC: 12 MG/DL (ref 8–23)
CALCIUM SERPL-MCNC: 9.4 MG/DL (ref 8.8–10.2)
CHLORIDE SERPL-SCNC: 97 MEQ/L (ref 98–111)
CO2 SERPL-SCNC: 21 MEQ/L (ref 22–29)
CREAT SERPL-MCNC: 0.7 MG/DL (ref 0.5–0.9)
DEPRECATED RDW RBC AUTO: 46.8 FL (ref 35–45)
ERYTHROCYTE [DISTWIDTH] IN BLOOD BY AUTOMATED COUNT: 15.6 % (ref 11.5–14.5)
GFR SERPL CREATININE-BSD FRML MDRD: > 90 ML/MIN/1.73M2
GLUCOSE BLD STRIP.AUTO-MCNC: 115 MG/DL (ref 70–108)
GLUCOSE BLD STRIP.AUTO-MCNC: 134 MG/DL (ref 70–108)
GLUCOSE BLD STRIP.AUTO-MCNC: 137 MG/DL (ref 70–108)
GLUCOSE BLD STRIP.AUTO-MCNC: 90 MG/DL (ref 70–108)
GLUCOSE SERPL-MCNC: 127 MG/DL (ref 74–109)
HCT VFR BLD AUTO: 31.4 % (ref 37–47)
HGB BLD-MCNC: 9.6 GM/DL (ref 12–16)
MCH RBC QN AUTO: 25.4 PG (ref 26–33)
MCHC RBC AUTO-ENTMCNC: 30.6 GM/DL (ref 32.2–35.5)
MCV RBC AUTO: 83.1 FL (ref 81–99)
PLATELET # BLD AUTO: 453 THOU/MM3 (ref 130–400)
PMV BLD AUTO: 8.8 FL (ref 9.4–12.4)
POTASSIUM SERPL-SCNC: 4.9 MEQ/L (ref 3.5–5.2)
RBC # BLD AUTO: 3.78 MILL/MM3 (ref 4.2–5.4)
SODIUM SERPL-SCNC: 128 MEQ/L (ref 135–145)
WBC # BLD AUTO: 12 THOU/MM3 (ref 4.8–10.8)

## 2025-06-02 PROCEDURE — 6370000000 HC RX 637 (ALT 250 FOR IP)

## 2025-06-02 PROCEDURE — 97535 SELF CARE MNGMENT TRAINING: CPT

## 2025-06-02 PROCEDURE — 82948 REAGENT STRIP/BLOOD GLUCOSE: CPT

## 2025-06-02 PROCEDURE — 97166 OT EVAL MOD COMPLEX 45 MIN: CPT

## 2025-06-02 PROCEDURE — C1769 GUIDE WIRE: HCPCS

## 2025-06-02 PROCEDURE — 94640 AIRWAY INHALATION TREATMENT: CPT

## 2025-06-02 PROCEDURE — 6360000002 HC RX W HCPCS: Performed by: RADIOLOGY

## 2025-06-02 PROCEDURE — 87077 CULTURE AEROBIC IDENTIFY: CPT

## 2025-06-02 PROCEDURE — 6360000002 HC RX W HCPCS

## 2025-06-02 PROCEDURE — 87070 CULTURE OTHR SPECIMN AEROBIC: CPT

## 2025-06-02 PROCEDURE — 2580000003 HC RX 258

## 2025-06-02 PROCEDURE — 36415 COLL VENOUS BLD VENIPUNCTURE: CPT

## 2025-06-02 PROCEDURE — 85027 COMPLETE CBC AUTOMATED: CPT

## 2025-06-02 PROCEDURE — 87075 CULTR BACTERIA EXCEPT BLOOD: CPT

## 2025-06-02 PROCEDURE — 99233 SBSQ HOSP IP/OBS HIGH 50: CPT | Performed by: PHYSICIAN ASSISTANT

## 2025-06-02 PROCEDURE — 1200000003 HC TELEMETRY R&B

## 2025-06-02 PROCEDURE — 87205 SMEAR GRAM STAIN: CPT

## 2025-06-02 PROCEDURE — 87186 SC STD MICRODIL/AGAR DIL: CPT

## 2025-06-02 PROCEDURE — 2580000003 HC RX 258: Performed by: PHYSICIAN ASSISTANT

## 2025-06-02 PROCEDURE — 84478 ASSAY OF TRIGLYCERIDES: CPT

## 2025-06-02 PROCEDURE — 80048 BASIC METABOLIC PNL TOTAL CA: CPT

## 2025-06-02 PROCEDURE — 1200000000 HC SEMI PRIVATE

## 2025-06-02 RX ORDER — FENTANYL CITRATE 50 UG/ML
INJECTION, SOLUTION INTRAMUSCULAR; INTRAVENOUS PRN
Status: COMPLETED | OUTPATIENT
Start: 2025-06-02 | End: 2025-06-02

## 2025-06-02 RX ORDER — SODIUM CHLORIDE 9 MG/ML
INJECTION, SOLUTION INTRAVENOUS CONTINUOUS
Status: DISCONTINUED | OUTPATIENT
Start: 2025-06-02 | End: 2025-06-03

## 2025-06-02 RX ORDER — MIDAZOLAM HYDROCHLORIDE 1 MG/ML
INJECTION, SOLUTION INTRAMUSCULAR; INTRAVENOUS PRN
Status: COMPLETED | OUTPATIENT
Start: 2025-06-02 | End: 2025-06-02

## 2025-06-02 RX ADMIN — PIPERACILLIN SODIUM AND TAZOBACTAM SODIUM 3375 MG: 3; .375 INJECTION, POWDER, LYOPHILIZED, FOR SOLUTION INTRAVENOUS at 05:37

## 2025-06-02 RX ADMIN — SODIUM CHLORIDE: 9 INJECTION, SOLUTION INTRAVENOUS at 14:18

## 2025-06-02 RX ADMIN — TIOTROPIUM BROMIDE INHALATION SPRAY 2 PUFF: 3.12 SPRAY, METERED RESPIRATORY (INHALATION) at 09:03

## 2025-06-02 RX ADMIN — TROSPIUM CHLORIDE 20 MG: 20 TABLET, FILM COATED ORAL at 05:40

## 2025-06-02 RX ADMIN — DIVALPROEX SODIUM 125 MG: 125 CAPSULE ORAL at 20:24

## 2025-06-02 RX ADMIN — OXYCODONE HYDROCHLORIDE 5 MG: 5 TABLET ORAL at 08:28

## 2025-06-02 RX ADMIN — OXYCODONE HYDROCHLORIDE 5 MG: 5 TABLET ORAL at 00:59

## 2025-06-02 RX ADMIN — LATANOPROST 1 DROP: 50 SOLUTION OPHTHALMIC at 20:25

## 2025-06-02 RX ADMIN — DOCUSATE SODIUM 100 MG: 100 CAPSULE, LIQUID FILLED ORAL at 20:24

## 2025-06-02 RX ADMIN — MORPHINE SULFATE 2 MG: 2 INJECTION, SOLUTION INTRAMUSCULAR; INTRAVENOUS at 11:12

## 2025-06-02 RX ADMIN — ROPINIROLE HYDROCHLORIDE 1 MG: 1 TABLET, FILM COATED ORAL at 20:24

## 2025-06-02 RX ADMIN — TRAZODONE HYDROCHLORIDE 200 MG: 100 TABLET ORAL at 20:24

## 2025-06-02 RX ADMIN — DIVALPROEX SODIUM 125 MG: 125 CAPSULE ORAL at 08:28

## 2025-06-02 RX ADMIN — AMLODIPINE BESYLATE 5 MG: 5 TABLET ORAL at 20:24

## 2025-06-02 RX ADMIN — LEVOTHYROXINE SODIUM 75 MCG: 0.07 TABLET ORAL at 05:40

## 2025-06-02 RX ADMIN — INSULIN GLARGINE 10 UNITS: 100 INJECTION, SOLUTION SUBCUTANEOUS at 20:24

## 2025-06-02 RX ADMIN — PANTOPRAZOLE SODIUM 40 MG: 40 TABLET, DELAYED RELEASE ORAL at 05:40

## 2025-06-02 RX ADMIN — PIPERACILLIN SODIUM AND TAZOBACTAM SODIUM 3375 MG: 3; .375 INJECTION, POWDER, LYOPHILIZED, FOR SOLUTION INTRAVENOUS at 14:20

## 2025-06-02 RX ADMIN — BUDESONIDE AND FORMOTEROL FUMARATE DIHYDRATE 2 PUFF: 80; 4.5 AEROSOL RESPIRATORY (INHALATION) at 09:02

## 2025-06-02 RX ADMIN — AMLODIPINE BESYLATE 5 MG: 5 TABLET ORAL at 08:28

## 2025-06-02 RX ADMIN — OXYCODONE HYDROCHLORIDE 5 MG: 5 TABLET ORAL at 20:24

## 2025-06-02 RX ADMIN — DULOXETINE 120 MG: 60 CAPSULE, DELAYED RELEASE ORAL at 08:28

## 2025-06-02 RX ADMIN — MIDAZOLAM 1 MG: 1 INJECTION INTRAMUSCULAR; INTRAVENOUS at 16:25

## 2025-06-02 RX ADMIN — BUPROPION HYDROCHLORIDE 300 MG: 300 TABLET, EXTENDED RELEASE ORAL at 08:28

## 2025-06-02 RX ADMIN — FERROUS SULFATE TAB 325 MG (65 MG ELEMENTAL FE) 325 MG: 325 (65 FE) TAB at 08:28

## 2025-06-02 RX ADMIN — ATORVASTATIN CALCIUM 40 MG: 40 TABLET, FILM COATED ORAL at 20:24

## 2025-06-02 RX ADMIN — PIPERACILLIN SODIUM AND TAZOBACTAM SODIUM 3375 MG: 3; .375 INJECTION, POWDER, LYOPHILIZED, FOR SOLUTION INTRAVENOUS at 23:09

## 2025-06-02 RX ADMIN — PANTOPRAZOLE SODIUM 40 MG: 40 TABLET, DELAYED RELEASE ORAL at 17:11

## 2025-06-02 RX ADMIN — TROSPIUM CHLORIDE 20 MG: 20 TABLET, FILM COATED ORAL at 17:11

## 2025-06-02 RX ADMIN — FENOFIBRATE 160 MG: 160 TABLET ORAL at 08:28

## 2025-06-02 RX ADMIN — FENTANYL CITRATE 50 MCG: 50 INJECTION, SOLUTION INTRAMUSCULAR; INTRAVENOUS at 16:25

## 2025-06-02 RX ADMIN — SODIUM CHLORIDE: 9 INJECTION, SOLUTION INTRAVENOUS at 23:10

## 2025-06-02 ASSESSMENT — PAIN DESCRIPTION - DESCRIPTORS
DESCRIPTORS: SHARP;ACHING
DESCRIPTORS: ACHING;SORE;SHARP
DESCRIPTORS: SHARP
DESCRIPTORS: ACHING

## 2025-06-02 ASSESSMENT — PAIN - FUNCTIONAL ASSESSMENT
PAIN_FUNCTIONAL_ASSESSMENT: ACTIVITIES ARE NOT PREVENTED

## 2025-06-02 ASSESSMENT — PAIN SCALES - GENERAL
PAINLEVEL_OUTOF10: 8
PAINLEVEL_OUTOF10: 7
PAINLEVEL_OUTOF10: 9
PAINLEVEL_OUTOF10: 9
PAINLEVEL_OUTOF10: 7
PAINLEVEL_OUTOF10: 9
PAINLEVEL_OUTOF10: 7
PAINLEVEL_OUTOF10: 8

## 2025-06-02 ASSESSMENT — PAIN DESCRIPTION - ORIENTATION
ORIENTATION: LEFT
ORIENTATION: RIGHT;LEFT
ORIENTATION: RIGHT;LEFT
ORIENTATION: LEFT;LOWER

## 2025-06-02 ASSESSMENT — PAIN DESCRIPTION - LOCATION
LOCATION: FLANK
LOCATION: ABDOMEN;BACK

## 2025-06-02 NOTE — DISCHARGE INSTR - COC
ESOPHAGUS      EGD      ELBOW SURGERY Right 10/07/2004    Dr. Cronin    ELBOW SURGERY Bilateral 2016    decompression    HYSTERECTOMY (CERVIX STATUS UNKNOWN)  1998    Dr. Manuel HUTCHINSON with BSO    KIDNEY REMOVAL Left 11/2024    at [facility]    MANDIBLE FRACTURE SURGERY  1984    ROTATOR CUFF REPAIR  2004, 2014    right shoulder    SHOULDER SURGERY  2014    right    SKIN BIOPSY  2006    under left eye    STIMULATOR SURGERY N/A 11/04/2020    STAGE 1 INTERSTIM PLACEMENT performed by Chris Avila MD at [facility] OR    STIMULATOR SURGERY N/A 11/23/2020    PLACEMENT OF STAGE II INTERSTIM performed by Chris Avila MD at [facility] OR    STIMULATOR SURGERY N/A 04/14/2022    Removal of Interstim performed by Sami Lanier MD at [facility] OR    URETER SURGERY Left 01/12/2023    Cystoscopy Left Ureteroscopy Laser Lithotripsy Basket Retrieval of Stone Fragments possible Left Ureteral Stent performed by Sami Lanier MD at [facility] OR       Immunization History:   Immunization History   Administered Date(s) Administered    COVID-19, J&J, (age 18y+), IM, 0.5 mL 04/27/2021    COVID-19, MODERNA BLUE border, Primary or Immunocompromised, (age 12y+), IM, 100 mcg/0.5mL 01/11/2022    Hepatitis A Vaccine 02/16/2012, 10/14/2013    Hepatitis B vaccine 02/16/2012    Influenza Virus Vaccine 02/16/2012, 10/10/2012, 10/14/2013, 09/12/2014, 10/11/2015, 10/27/2016, 11/14/2017, 10/08/2018    Influenza, FLUARIX, FLULAVAL, FLUZONE (age 6 mo+) and AFLURIA, (age 3 y+), Quadv PF, 0.5mL 10/27/2016, 10/08/2018    Influenza, FLUCELVAX, (age 6 mo+), MDCK, Quadv MDV, 0.5mL 10/01/2019, 10/21/2020    Pneumococcal Vaccine 02/16/2012    Pneumococcal, PPSV23, PNEUMOVAX 23, (age 2y+), SC/IM, 0.5mL 02/16/2012, 10/11/2015    TDaP, ADACEL (age 10y-64y), BOOSTRIX (age 10y+), IM, 0.5mL 06/24/2013, 04/22/2016, 06/11/2019       Active Problems:  Patient Active Problem List   Diagnosis Code    GERD (gastroesophageal reflux disease) K21.9    Colon polyps K63.5    COPD

## 2025-06-03 ENCOUNTER — TELEPHONE (OUTPATIENT)
Dept: UROLOGY | Age: 70
End: 2025-06-03

## 2025-06-03 PROBLEM — N30.00 ACUTE CYSTITIS WITHOUT HEMATURIA: Status: ACTIVE | Noted: 2024-01-19

## 2025-06-03 LAB
ANION GAP SERPL CALC-SCNC: 10 MEQ/L (ref 8–16)
BACTERIA UR CULT: ABNORMAL
BUN SERPL-MCNC: 10 MG/DL (ref 8–23)
CALCIUM SERPL-MCNC: 9.7 MG/DL (ref 8.8–10.2)
CHLORIDE SERPL-SCNC: 101 MEQ/L (ref 98–111)
CO2 SERPL-SCNC: 22 MEQ/L (ref 22–29)
CREAT SERPL-MCNC: 0.8 MG/DL (ref 0.5–0.9)
GFR SERPL CREATININE-BSD FRML MDRD: 80 ML/MIN/1.73M2
GLUCOSE BLD STRIP.AUTO-MCNC: 103 MG/DL (ref 70–108)
GLUCOSE BLD STRIP.AUTO-MCNC: 116 MG/DL (ref 70–108)
GLUCOSE BLD STRIP.AUTO-MCNC: 123 MG/DL (ref 70–108)
GLUCOSE BLD STRIP.AUTO-MCNC: 125 MG/DL (ref 70–108)
GLUCOSE SERPL-MCNC: 97 MG/DL (ref 74–109)
ORGANISM: ABNORMAL
POTASSIUM SERPL-SCNC: 4.5 MEQ/L (ref 3.5–5.2)
SODIUM SERPL-SCNC: 133 MEQ/L (ref 135–145)

## 2025-06-03 PROCEDURE — 6360000002 HC RX W HCPCS: Performed by: PHYSICIAN ASSISTANT

## 2025-06-03 PROCEDURE — 99231 SBSQ HOSP IP/OBS SF/LOW 25: CPT | Performed by: UROLOGY

## 2025-06-03 PROCEDURE — 2580000003 HC RX 258: Performed by: PHYSICIAN ASSISTANT

## 2025-06-03 PROCEDURE — 80048 BASIC METABOLIC PNL TOTAL CA: CPT

## 2025-06-03 PROCEDURE — 6370000000 HC RX 637 (ALT 250 FOR IP)

## 2025-06-03 PROCEDURE — 2580000003 HC RX 258

## 2025-06-03 PROCEDURE — 36415 COLL VENOUS BLD VENIPUNCTURE: CPT

## 2025-06-03 PROCEDURE — 6360000002 HC RX W HCPCS

## 2025-06-03 PROCEDURE — 94761 N-INVAS EAR/PLS OXIMETRY MLT: CPT

## 2025-06-03 PROCEDURE — 82948 REAGENT STRIP/BLOOD GLUCOSE: CPT

## 2025-06-03 PROCEDURE — 1200000000 HC SEMI PRIVATE

## 2025-06-03 PROCEDURE — 2500000003 HC RX 250 WO HCPCS

## 2025-06-03 PROCEDURE — 99233 SBSQ HOSP IP/OBS HIGH 50: CPT | Performed by: PHYSICIAN ASSISTANT

## 2025-06-03 PROCEDURE — 94640 AIRWAY INHALATION TREATMENT: CPT

## 2025-06-03 PROCEDURE — 6370000000 HC RX 637 (ALT 250 FOR IP): Performed by: PHYSICIAN ASSISTANT

## 2025-06-03 RX ORDER — OXYCODONE HYDROCHLORIDE 5 MG/1
5 TABLET ORAL EVERY 4 HOURS PRN
Refills: 0 | Status: DISCONTINUED | OUTPATIENT
Start: 2025-06-03 | End: 2025-06-08 | Stop reason: HOSPADM

## 2025-06-03 RX ORDER — OXYCODONE HYDROCHLORIDE 5 MG/1
10 TABLET ORAL EVERY 4 HOURS PRN
Refills: 0 | Status: DISCONTINUED | OUTPATIENT
Start: 2025-06-03 | End: 2025-06-08 | Stop reason: HOSPADM

## 2025-06-03 RX ORDER — ALBUTEROL SULFATE 0.83 MG/ML
2.5 SOLUTION RESPIRATORY (INHALATION) EVERY 4 HOURS PRN
Status: DISCONTINUED | OUTPATIENT
Start: 2025-06-03 | End: 2025-06-08 | Stop reason: HOSPADM

## 2025-06-03 RX ADMIN — OXYCODONE 10 MG: 5 TABLET ORAL at 20:55

## 2025-06-03 RX ADMIN — BUDESONIDE AND FORMOTEROL FUMARATE DIHYDRATE 2 PUFF: 80; 4.5 AEROSOL RESPIRATORY (INHALATION) at 08:12

## 2025-06-03 RX ADMIN — PANTOPRAZOLE SODIUM 40 MG: 40 TABLET, DELAYED RELEASE ORAL at 16:05

## 2025-06-03 RX ADMIN — DIVALPROEX SODIUM 125 MG: 125 CAPSULE ORAL at 20:58

## 2025-06-03 RX ADMIN — PANTOPRAZOLE SODIUM 40 MG: 40 TABLET, DELAYED RELEASE ORAL at 05:56

## 2025-06-03 RX ADMIN — LEVOTHYROXINE SODIUM 75 MCG: 0.07 TABLET ORAL at 05:56

## 2025-06-03 RX ADMIN — SODIUM CHLORIDE: 9 INJECTION, SOLUTION INTRAVENOUS at 13:11

## 2025-06-03 RX ADMIN — AMLODIPINE BESYLATE 5 MG: 5 TABLET ORAL at 20:59

## 2025-06-03 RX ADMIN — OXYCODONE HYDROCHLORIDE 5 MG: 5 TABLET ORAL at 10:56

## 2025-06-03 RX ADMIN — CARVEDILOL 3.12 MG: 3.12 TABLET, FILM COATED ORAL at 16:05

## 2025-06-03 RX ADMIN — PIPERACILLIN SODIUM AND TAZOBACTAM SODIUM 3375 MG: 3; .375 INJECTION, POWDER, LYOPHILIZED, FOR SOLUTION INTRAVENOUS at 05:59

## 2025-06-03 RX ADMIN — DOCUSATE SODIUM 100 MG: 100 CAPSULE, LIQUID FILLED ORAL at 20:56

## 2025-06-03 RX ADMIN — MEROPENEM 2000 MG: 1 INJECTION INTRAVENOUS at 13:13

## 2025-06-03 RX ADMIN — BUPROPION HYDROCHLORIDE 300 MG: 300 TABLET, EXTENDED RELEASE ORAL at 08:02

## 2025-06-03 RX ADMIN — OXYCODONE 10 MG: 5 TABLET ORAL at 16:05

## 2025-06-03 RX ADMIN — INSULIN GLARGINE 10 UNITS: 100 INJECTION, SOLUTION SUBCUTANEOUS at 20:58

## 2025-06-03 RX ADMIN — TROSPIUM CHLORIDE 20 MG: 20 TABLET, FILM COATED ORAL at 05:56

## 2025-06-03 RX ADMIN — MEROPENEM 1000 MG: 1 INJECTION INTRAVENOUS at 21:13

## 2025-06-03 RX ADMIN — CARVEDILOL 3.12 MG: 3.12 TABLET, FILM COATED ORAL at 08:02

## 2025-06-03 RX ADMIN — AMLODIPINE BESYLATE 5 MG: 5 TABLET ORAL at 08:02

## 2025-06-03 RX ADMIN — ATORVASTATIN CALCIUM 40 MG: 40 TABLET, FILM COATED ORAL at 20:59

## 2025-06-03 RX ADMIN — DIVALPROEX SODIUM 125 MG: 125 CAPSULE ORAL at 08:02

## 2025-06-03 RX ADMIN — BUDESONIDE AND FORMOTEROL FUMARATE DIHYDRATE 2 PUFF: 80; 4.5 AEROSOL RESPIRATORY (INHALATION) at 20:18

## 2025-06-03 RX ADMIN — DOCUSATE SODIUM 100 MG: 100 CAPSULE, LIQUID FILLED ORAL at 08:02

## 2025-06-03 RX ADMIN — ENOXAPARIN SODIUM 40 MG: 100 INJECTION SUBCUTANEOUS at 08:02

## 2025-06-03 RX ADMIN — TIOTROPIUM BROMIDE INHALATION SPRAY 2 PUFF: 3.12 SPRAY, METERED RESPIRATORY (INHALATION) at 08:12

## 2025-06-03 RX ADMIN — OXYCODONE HYDROCHLORIDE 5 MG: 5 TABLET ORAL at 04:37

## 2025-06-03 RX ADMIN — FENOFIBRATE 160 MG: 160 TABLET ORAL at 08:02

## 2025-06-03 RX ADMIN — DULOXETINE 120 MG: 60 CAPSULE, DELAYED RELEASE ORAL at 08:02

## 2025-06-03 RX ADMIN — ROPINIROLE HYDROCHLORIDE 1 MG: 1 TABLET, FILM COATED ORAL at 20:59

## 2025-06-03 RX ADMIN — TROSPIUM CHLORIDE 20 MG: 20 TABLET, FILM COATED ORAL at 16:05

## 2025-06-03 RX ADMIN — TRAZODONE HYDROCHLORIDE 200 MG: 100 TABLET ORAL at 20:59

## 2025-06-03 RX ADMIN — LATANOPROST 1 DROP: 50 SOLUTION OPHTHALMIC at 21:13

## 2025-06-03 RX ADMIN — SODIUM CHLORIDE, PRESERVATIVE FREE 10 ML: 5 INJECTION INTRAVENOUS at 20:56

## 2025-06-03 ASSESSMENT — PAIN SCALES - GENERAL
PAINLEVEL_OUTOF10: 8
PAINLEVEL_OUTOF10: 9
PAINLEVEL_OUTOF10: 8
PAINLEVEL_OUTOF10: 8
PAINLEVEL_OUTOF10: 9
PAINLEVEL_OUTOF10: 2
PAINLEVEL_OUTOF10: 6

## 2025-06-03 ASSESSMENT — PAIN - FUNCTIONAL ASSESSMENT
PAIN_FUNCTIONAL_ASSESSMENT: ACTIVITIES ARE NOT PREVENTED

## 2025-06-03 ASSESSMENT — PAIN DESCRIPTION - DESCRIPTORS
DESCRIPTORS: ACHING
DESCRIPTORS: STABBING
DESCRIPTORS: ACHING
DESCRIPTORS: ACHING
DESCRIPTORS: STABBING
DESCRIPTORS: ACHING

## 2025-06-03 ASSESSMENT — PAIN DESCRIPTION - DIRECTION: RADIATING_TOWARDS: BACK

## 2025-06-03 ASSESSMENT — PAIN DESCRIPTION - PAIN TYPE
TYPE: ACUTE PAIN

## 2025-06-03 ASSESSMENT — PAIN DESCRIPTION - LOCATION
LOCATION: FLANK
LOCATION: ABDOMEN
LOCATION: FLANK

## 2025-06-03 ASSESSMENT — PAIN DESCRIPTION - ORIENTATION
ORIENTATION: LEFT

## 2025-06-03 ASSESSMENT — PAIN DESCRIPTION - FREQUENCY
FREQUENCY: INTERMITTENT
FREQUENCY: CONTINUOUS
FREQUENCY: CONTINUOUS

## 2025-06-03 ASSESSMENT — PAIN DESCRIPTION - ONSET: ONSET: SUDDEN

## 2025-06-03 NOTE — CONSULTS
CONSULTATION NOTE :ID       Patient - Elli Coulter,  Age - 69 y.o.    - 1955      Room Number - 6K-12/012-A   MRN -  [id number]   [facility] # - [id number]  Date of Admission -  2025  4:00 PM  Patient's PCP: Marc De Oliveira MD     Requesting Physician: Dayanara Garcia PA-C    REASON FOR CONSULTATION   Recurrent fluid collection over left renal bed.  CHIEF COMPLAINT   Left flank pain    HISTORY OF PRESENT ILLNESS       This is a very pleasant 69 y.o. female who was admitted to the hospital with a chief complaints of left flank pain for a few days prior to her admission patient had previous history of left nephrectomy unfortunately she had recurrent fluid collection over the nephrectomy bed requiring drainage.  She was admitted few days ago due to pain on her left flank area she denies any fever or chills she did report that she had dysuria and frequency a drain was placed at and has whitish fluid.  She has no nausea or vomiting.  She is currently on IV antibiotics meropenem due to a positive urine culture report.  She has history of bipolar disorder COPD and substance use disorder.    PAST MEDICAL  HISTORY       Past Medical History:   Diagnosis Date    Acute cystitis with hematuria 2024    Acute encephalopathy 01/10/2024    Alcohol dependence in remission ([facility])     Allergic rhinitis     Arthritis     back, arms, hips    Bacteriuria 10/13/2024    Bipolar 1 disorder ([facility])     Cancer ([facility])     cancerous polyps removed - Dr. Silva    Cannabis abuse     Cataract     Cellulitis 2025    left side with drain after kidney removal L side    Change of, skin texture 2014    Cocaine abuse ([facility])     Colon polyps     COPD (chronic obstructive pulmonary disease) ([facility]) 2014    Coronary vasospasm 2019    Depression     Disorder associated with Helicobacter species 2012    Diverticulitis of colon     Endometriosis 2019    Erosive esophagitis 
Ct findings of a rim enhancing fluid collection in the L retroperitoneum measuring 10.8 cm in size- increased from prior. Our group recommended transfer to a tertiary care center but the patient has refused. She is now s/p placement of a retroperitoneal abscess drain with IR on 1/16/2025. Drain removed 2/9/25 due to concern for cellulitis at that time.     UTI, follow culture    S/p left nephrectomy at [facility] 11/14/24    Plan:    Supportive care per primary   Antibiotics per primary. Zosyn  IR ordered for drain placement of abscess/seroma already. Per staff not being done today.   NPO midnight for drain placement.  NO urological intervention planned.     ID has previously followed, Dr Virk. Consider consulting if needed.     Previously was recommended to follow up as well with [facility] as her surgery was completed there.    Follow         Thank you for including us in the care of Elli Mack, BA - KESHAV, BA  06/01/25 8:34 AM  Urology

## 2025-06-04 LAB
ANION GAP SERPL CALC-SCNC: 10 MEQ/L (ref 8–16)
BUN SERPL-MCNC: 12 MG/DL (ref 8–23)
CALCIUM SERPL-MCNC: 10.1 MG/DL (ref 8.8–10.2)
CHLORIDE SERPL-SCNC: 99 MEQ/L (ref 98–111)
CO2 SERPL-SCNC: 24 MEQ/L (ref 22–29)
CREAT SERPL-MCNC: 0.7 MG/DL (ref 0.5–0.9)
DEPRECATED RDW RBC AUTO: 47.2 FL (ref 35–45)
ERYTHROCYTE [DISTWIDTH] IN BLOOD BY AUTOMATED COUNT: 15.7 % (ref 11.5–14.5)
GFR SERPL CREATININE-BSD FRML MDRD: > 90 ML/MIN/1.73M2
GLUCOSE BLD STRIP.AUTO-MCNC: 114 MG/DL (ref 70–108)
GLUCOSE BLD STRIP.AUTO-MCNC: 115 MG/DL (ref 70–108)
GLUCOSE BLD STRIP.AUTO-MCNC: 83 MG/DL (ref 70–108)
GLUCOSE BLD STRIP.AUTO-MCNC: 97 MG/DL (ref 70–108)
GLUCOSE SERPL-MCNC: 97 MG/DL (ref 74–109)
HCT VFR BLD AUTO: 30 % (ref 37–47)
HGB BLD-MCNC: 9.1 GM/DL (ref 12–16)
MCH RBC QN AUTO: 24.9 PG (ref 26–33)
MCHC RBC AUTO-ENTMCNC: 30.3 GM/DL (ref 32.2–35.5)
MCV RBC AUTO: 82 FL (ref 81–99)
PLATELET # BLD AUTO: 394 THOU/MM3 (ref 130–400)
PMV BLD AUTO: 8.2 FL (ref 9.4–12.4)
POTASSIUM SERPL-SCNC: 4.7 MEQ/L (ref 3.5–5.2)
RBC # BLD AUTO: 3.66 MILL/MM3 (ref 4.2–5.4)
SODIUM SERPL-SCNC: 133 MEQ/L (ref 135–145)
WBC # BLD AUTO: 6.1 THOU/MM3 (ref 4.8–10.8)

## 2025-06-04 PROCEDURE — 6370000000 HC RX 637 (ALT 250 FOR IP)

## 2025-06-04 PROCEDURE — 85027 COMPLETE CBC AUTOMATED: CPT

## 2025-06-04 PROCEDURE — 1200000000 HC SEMI PRIVATE

## 2025-06-04 PROCEDURE — 6360000002 HC RX W HCPCS: Performed by: NURSE PRACTITIONER

## 2025-06-04 PROCEDURE — 6360000002 HC RX W HCPCS: Performed by: PHYSICIAN ASSISTANT

## 2025-06-04 PROCEDURE — 2500000003 HC RX 250 WO HCPCS

## 2025-06-04 PROCEDURE — 99232 SBSQ HOSP IP/OBS MODERATE 35: CPT | Performed by: NURSE PRACTITIONER

## 2025-06-04 PROCEDURE — 82948 REAGENT STRIP/BLOOD GLUCOSE: CPT

## 2025-06-04 PROCEDURE — 36415 COLL VENOUS BLD VENIPUNCTURE: CPT

## 2025-06-04 PROCEDURE — 94640 AIRWAY INHALATION TREATMENT: CPT

## 2025-06-04 PROCEDURE — 2580000003 HC RX 258: Performed by: PHYSICIAN ASSISTANT

## 2025-06-04 PROCEDURE — 6360000002 HC RX W HCPCS

## 2025-06-04 PROCEDURE — 94761 N-INVAS EAR/PLS OXIMETRY MLT: CPT

## 2025-06-04 PROCEDURE — 80048 BASIC METABOLIC PNL TOTAL CA: CPT

## 2025-06-04 PROCEDURE — 6370000000 HC RX 637 (ALT 250 FOR IP): Performed by: PHYSICIAN ASSISTANT

## 2025-06-04 RX ORDER — ONDANSETRON 2 MG/ML
4 INJECTION INTRAMUSCULAR; INTRAVENOUS EVERY 6 HOURS PRN
Status: DISCONTINUED | OUTPATIENT
Start: 2025-06-04 | End: 2025-06-08 | Stop reason: HOSPADM

## 2025-06-04 RX ADMIN — FERROUS SULFATE TAB 325 MG (65 MG ELEMENTAL FE) 325 MG: 325 (65 FE) TAB at 08:13

## 2025-06-04 RX ADMIN — OXYCODONE 10 MG: 5 TABLET ORAL at 23:15

## 2025-06-04 RX ADMIN — DULOXETINE 120 MG: 60 CAPSULE, DELAYED RELEASE ORAL at 08:13

## 2025-06-04 RX ADMIN — PANTOPRAZOLE SODIUM 40 MG: 40 TABLET, DELAYED RELEASE ORAL at 05:58

## 2025-06-04 RX ADMIN — INSULIN GLARGINE 10 UNITS: 100 INJECTION, SOLUTION SUBCUTANEOUS at 20:08

## 2025-06-04 RX ADMIN — SODIUM CHLORIDE, PRESERVATIVE FREE 10 ML: 5 INJECTION INTRAVENOUS at 08:12

## 2025-06-04 RX ADMIN — MEROPENEM 1000 MG: 1 INJECTION INTRAVENOUS at 04:15

## 2025-06-04 RX ADMIN — PANTOPRAZOLE SODIUM 40 MG: 40 TABLET, DELAYED RELEASE ORAL at 16:51

## 2025-06-04 RX ADMIN — DOCUSATE SODIUM 100 MG: 100 CAPSULE, LIQUID FILLED ORAL at 08:13

## 2025-06-04 RX ADMIN — ONDANSETRON 4 MG: 2 INJECTION, SOLUTION INTRAMUSCULAR; INTRAVENOUS at 16:32

## 2025-06-04 RX ADMIN — TROSPIUM CHLORIDE 20 MG: 20 TABLET, FILM COATED ORAL at 05:57

## 2025-06-04 RX ADMIN — TIOTROPIUM BROMIDE INHALATION SPRAY 2 PUFF: 3.12 SPRAY, METERED RESPIRATORY (INHALATION) at 07:36

## 2025-06-04 RX ADMIN — MEROPENEM 1000 MG: 1 INJECTION INTRAVENOUS at 12:15

## 2025-06-04 RX ADMIN — AMLODIPINE BESYLATE 5 MG: 5 TABLET ORAL at 08:13

## 2025-06-04 RX ADMIN — DIVALPROEX SODIUM 125 MG: 125 CAPSULE ORAL at 20:08

## 2025-06-04 RX ADMIN — BUDESONIDE AND FORMOTEROL FUMARATE DIHYDRATE 2 PUFF: 80; 4.5 AEROSOL RESPIRATORY (INHALATION) at 07:36

## 2025-06-04 RX ADMIN — OXYCODONE 10 MG: 5 TABLET ORAL at 16:51

## 2025-06-04 RX ADMIN — LEVOTHYROXINE SODIUM 75 MCG: 0.07 TABLET ORAL at 05:58

## 2025-06-04 RX ADMIN — ATORVASTATIN CALCIUM 40 MG: 40 TABLET, FILM COATED ORAL at 20:08

## 2025-06-04 RX ADMIN — AMLODIPINE BESYLATE 5 MG: 5 TABLET ORAL at 20:08

## 2025-06-04 RX ADMIN — BUPROPION HYDROCHLORIDE 300 MG: 300 TABLET, EXTENDED RELEASE ORAL at 08:13

## 2025-06-04 RX ADMIN — OXYCODONE 10 MG: 5 TABLET ORAL at 08:13

## 2025-06-04 RX ADMIN — OXYCODONE 10 MG: 5 TABLET ORAL at 12:13

## 2025-06-04 RX ADMIN — TRAZODONE HYDROCHLORIDE 200 MG: 100 TABLET ORAL at 20:08

## 2025-06-04 RX ADMIN — MEROPENEM 1000 MG: 1 INJECTION INTRAVENOUS at 20:11

## 2025-06-04 RX ADMIN — ROPINIROLE HYDROCHLORIDE 1 MG: 1 TABLET, FILM COATED ORAL at 20:08

## 2025-06-04 RX ADMIN — SODIUM CHLORIDE, PRESERVATIVE FREE 10 ML: 5 INJECTION INTRAVENOUS at 20:08

## 2025-06-04 RX ADMIN — ENOXAPARIN SODIUM 40 MG: 100 INJECTION SUBCUTANEOUS at 08:12

## 2025-06-04 RX ADMIN — OXYCODONE 10 MG: 5 TABLET ORAL at 04:10

## 2025-06-04 RX ADMIN — DIVALPROEX SODIUM 125 MG: 125 CAPSULE ORAL at 08:13

## 2025-06-04 RX ADMIN — LATANOPROST 1 DROP: 50 SOLUTION OPHTHALMIC at 20:09

## 2025-06-04 RX ADMIN — DOCUSATE SODIUM 100 MG: 100 CAPSULE, LIQUID FILLED ORAL at 20:08

## 2025-06-04 RX ADMIN — FENOFIBRATE 160 MG: 160 TABLET ORAL at 08:13

## 2025-06-04 RX ADMIN — BUDESONIDE AND FORMOTEROL FUMARATE DIHYDRATE 2 PUFF: 80; 4.5 AEROSOL RESPIRATORY (INHALATION) at 20:22

## 2025-06-04 RX ADMIN — TROSPIUM CHLORIDE 20 MG: 20 TABLET, FILM COATED ORAL at 16:51

## 2025-06-04 ASSESSMENT — PAIN DESCRIPTION - LOCATION
LOCATION: FLANK
LOCATION: FLANK

## 2025-06-04 ASSESSMENT — PAIN SCALES - GENERAL
PAINLEVEL_OUTOF10: 8
PAINLEVEL_OUTOF10: 7
PAINLEVEL_OUTOF10: 8
PAINLEVEL_OUTOF10: 7
PAINLEVEL_OUTOF10: 7
PAINLEVEL_OUTOF10: 8
PAINLEVEL_OUTOF10: 8

## 2025-06-04 ASSESSMENT — PAIN DESCRIPTION - ORIENTATION
ORIENTATION: LEFT
ORIENTATION: LEFT

## 2025-06-04 ASSESSMENT — PAIN DESCRIPTION - DESCRIPTORS
DESCRIPTORS: SHARP
DESCRIPTORS: ACHING

## 2025-06-04 NOTE — CARE COORDINATION
06/01/25 1514   Readmission Assessment   Number of Days since last admission? 8-30 days   Previous Disposition SNF  ([facility])   Who is being Interviewed Patient   What was the patient's/caregiver's perception as to why they think they needed to return back to the hospital? Other (Comment)  (Worsening flank pain with nausea)   Did you visit your Primary Care Physician after you left the hospital, before you returned this time? No   Why weren't you able to visit your PCP? Did not have an appointment   Did you see a specialist, such as Cardiac, Pulmonary, Orthopedic Physician, etc. after you left the hospital? Yes   Who advised the patient to return to the hospital? Self-referral   Does the patient report anything that got in the way of taking their medications? No   In our efforts to provide the best possible care to you and others like you, can you think of anything that we could have done to help you after you left the hospital the first time, so that you might not have needed to return so soon? Other (Comment)  (No)       
   06/01/25 1515   Service Assessment   Patient Orientation Alert and Oriented   Cognition Alert   History Provided By Patient   Primary Caregiver Self   Accompanied By/Relationship n/a   Support Systems Family Members;Children   Patient's Healthcare Decision Maker is: Named in Scanned ACP Document   PCP Verified by CM Yes   Prior Functional Level Assistance with the following:;Bathing;Dressing;Toileting;Housework;Cooking;Mobility;Shopping   Current Functional Level Assistance with the following:;Bathing;Dressing;Toileting;Cooking;Housework;Mobility;Shopping   Can patient return to prior living arrangement Yes   Ability to make needs known: Good   Family able to assist with home care needs: Other (comment)  (n/a - from SNF)   Would you like for me to discuss the discharge plan with any other family members/significant others, and if so, who? No   Financial Resources Medicare;Medicaid   Community Resources ECF/Home Care  ([facility])   Social/Functional History   Active  No   Patient's  Info Facility   Discharge Planning   Type of Residence Skilled Nursing Facility  ([facility])   Living Arrangements Other (Comment)  (SNF)   Current Services Prior To Admission Skilled Nursing Facility   Potential Assistance Needed Skilled Nursing Facility   DME Ordered? No   Potential Assistance Purchasing Medications No   Type of Home Care Services None   Patient expects to be discharged to: Skilled nursing facility  ([facility])   Services At/After Discharge   Confirm Follow Up Transport Other (see comment)  (Will need transport)   Condition of Participation: Discharge Planning   The Plan for Transition of Care is related to the following treatment goals: Feel better     Patient Goals/Plan/Treatment Preferences: Return to [facility]. Mostly wheelchair bound; only able to walk short distances. SW consulted.     If patient is discharged prior to next notation, then this note serves as note for 
6/3/25, 2:45 PM EDT    DISCHARGE ON GOING EVALUATION    Elli Coulter       Hospital day: 3  Location: -12/012-A Reason for admit: Abdominal pain, left lower quadrant [R10.32]  Hyponatremia [E87.1]  Intra-abdominal fluid collection [R18.8]  Renal abscess, left [N15.1]  Acute cystitis without hematuria [N30.00]     Procedures: 6/2 abscess drain placed in left retroperitoneum- cx pending     Imaging since last note: none    Barriers to Discharge: IVF, IV merrem. ID consult pending. Drain monitoring.     PCP: Marc De Oliveira MD  Readmission Risk Score: 34.5    Patient Goals/Plan/Treatment Preferences: Return to [facility]   
6/4/25, 4:46 PM EDT    DISCHARGE ON GOING EVALUATION    Elli Coulter       Hospital day: 4  Location: -12/012-A Reason for admit: Abdominal pain, left lower quadrant [R10.32]  Hyponatremia [E87.1]  Intra-abdominal fluid collection [R18.8]  Renal abscess, left [N15.1]  Acute cystitis without hematuria [N30.00]     Procedures:   6/2 abscess drain placed in left retroperitoneum- cx pending     Imaging since last note: no new    Barriers to Discharge: Hospitalist, urology and ID following. Await final culture results. IV Merrem for now.     PCP: Marc De Oliveira MD  Readmission Risk Score: 34.9    Patient Goals/Plan/Treatment Preferences: Return to [facility]. Follow SW notes for updates.     
Identified Issues/Barriers to RETURNING to current housing: none noted  Potential Assistance needed at discharge: Skilled Nursing Facility            Potential DME:    Patient expects to discharge to: Skilled nursing facility ([facility])  Plan for transportation at discharge: Other (see comment) (Will need transport)    Financial  Payor: [facility] MEDICARE / Plan: [employer] DUAL COMPLETE / Product Type: *No Product type* /     Potential assistance Purchasing Medications: No

## 2025-06-05 LAB
ANION GAP SERPL CALC-SCNC: 10 MEQ/L (ref 8–16)
BACTERIA SPEC AEROBE CULT: ABNORMAL
BACTERIA SPEC ANAEROBE CULT: ABNORMAL
BASOPHILS ABSOLUTE: 0 THOU/MM3 (ref 0–0.1)
BASOPHILS NFR BLD AUTO: 0.5 %
BUN SERPL-MCNC: 14 MG/DL (ref 8–23)
CALCIUM SERPL-MCNC: 10.3 MG/DL (ref 8.8–10.2)
CHLORIDE SERPL-SCNC: 99 MEQ/L (ref 98–111)
CO2 SERPL-SCNC: 24 MEQ/L (ref 22–29)
CREAT SERPL-MCNC: 0.8 MG/DL (ref 0.5–0.9)
DEPRECATED RDW RBC AUTO: 48.8 FL (ref 35–45)
EOSINOPHIL NFR BLD AUTO: 2.1 %
EOSINOPHILS ABSOLUTE: 0.1 THOU/MM3 (ref 0–0.4)
ERYTHROCYTE [DISTWIDTH] IN BLOOD BY AUTOMATED COUNT: 16 % (ref 11.5–14.5)
GFR SERPL CREATININE-BSD FRML MDRD: 80 ML/MIN/1.73M2
GLUCOSE BLD STRIP.AUTO-MCNC: 106 MG/DL (ref 70–108)
GLUCOSE BLD STRIP.AUTO-MCNC: 89 MG/DL (ref 70–108)
GLUCOSE BLD STRIP.AUTO-MCNC: 94 MG/DL (ref 70–108)
GLUCOSE BLD STRIP.AUTO-MCNC: 98 MG/DL (ref 70–108)
GLUCOSE SERPL-MCNC: 117 MG/DL (ref 74–109)
GRAM STN SPEC: ABNORMAL
HCT VFR BLD AUTO: 32.7 % (ref 37–47)
HGB BLD-MCNC: 10 GM/DL (ref 12–16)
IMM GRANULOCYTES # BLD AUTO: 0.11 THOU/MM3 (ref 0–0.07)
IMM GRANULOCYTES NFR BLD AUTO: 1.8 %
LYMPHOCYTES ABSOLUTE: 1.4 THOU/MM3 (ref 1–4.8)
LYMPHOCYTES NFR BLD AUTO: 23.3 %
MCH RBC QN AUTO: 25.6 PG (ref 26–33)
MCHC RBC AUTO-ENTMCNC: 30.6 GM/DL (ref 32.2–35.5)
MCV RBC AUTO: 83.8 FL (ref 81–99)
MONOCYTES ABSOLUTE: 0.4 THOU/MM3 (ref 0.4–1.3)
MONOCYTES NFR BLD AUTO: 6.4 %
NEUTROPHILS ABSOLUTE: 4 THOU/MM3 (ref 1.8–7.7)
NEUTROPHILS NFR BLD AUTO: 65.9 %
NRBC BLD AUTO-RTO: 0 /100 WBC
ORGANISM: ABNORMAL
PLATELET # BLD AUTO: 417 THOU/MM3 (ref 130–400)
PMV BLD AUTO: 8.1 FL (ref 9.4–12.4)
POTASSIUM SERPL-SCNC: 4.8 MEQ/L (ref 3.5–5.2)
RBC # BLD AUTO: 3.9 MILL/MM3 (ref 4.2–5.4)
SODIUM SERPL-SCNC: 133 MEQ/L (ref 135–145)
WBC # BLD AUTO: 6.1 THOU/MM3 (ref 4.8–10.8)

## 2025-06-05 PROCEDURE — 6370000000 HC RX 637 (ALT 250 FOR IP)

## 2025-06-05 PROCEDURE — 36415 COLL VENOUS BLD VENIPUNCTURE: CPT

## 2025-06-05 PROCEDURE — 80048 BASIC METABOLIC PNL TOTAL CA: CPT

## 2025-06-05 PROCEDURE — 97535 SELF CARE MNGMENT TRAINING: CPT

## 2025-06-05 PROCEDURE — 1200000000 HC SEMI PRIVATE

## 2025-06-05 PROCEDURE — 6370000000 HC RX 637 (ALT 250 FOR IP): Performed by: PHYSICIAN ASSISTANT

## 2025-06-05 PROCEDURE — 94640 AIRWAY INHALATION TREATMENT: CPT

## 2025-06-05 PROCEDURE — 2580000003 HC RX 258: Performed by: PHYSICIAN ASSISTANT

## 2025-06-05 PROCEDURE — 99232 SBSQ HOSP IP/OBS MODERATE 35: CPT | Performed by: NURSE PRACTITIONER

## 2025-06-05 PROCEDURE — 6360000002 HC RX W HCPCS: Performed by: PHYSICIAN ASSISTANT

## 2025-06-05 PROCEDURE — 6360000002 HC RX W HCPCS

## 2025-06-05 PROCEDURE — 85025 COMPLETE CBC W/AUTO DIFF WBC: CPT

## 2025-06-05 PROCEDURE — 2500000003 HC RX 250 WO HCPCS

## 2025-06-05 PROCEDURE — 82948 REAGENT STRIP/BLOOD GLUCOSE: CPT

## 2025-06-05 RX ADMIN — DOCUSATE SODIUM 100 MG: 100 CAPSULE, LIQUID FILLED ORAL at 19:37

## 2025-06-05 RX ADMIN — AMLODIPINE BESYLATE 5 MG: 5 TABLET ORAL at 19:37

## 2025-06-05 RX ADMIN — FENOFIBRATE 160 MG: 160 TABLET ORAL at 08:59

## 2025-06-05 RX ADMIN — LEVOTHYROXINE SODIUM 75 MCG: 0.07 TABLET ORAL at 04:50

## 2025-06-05 RX ADMIN — ENOXAPARIN SODIUM 40 MG: 100 INJECTION SUBCUTANEOUS at 09:00

## 2025-06-05 RX ADMIN — AMLODIPINE BESYLATE 5 MG: 5 TABLET ORAL at 08:59

## 2025-06-05 RX ADMIN — BUDESONIDE AND FORMOTEROL FUMARATE DIHYDRATE 2 PUFF: 80; 4.5 AEROSOL RESPIRATORY (INHALATION) at 22:35

## 2025-06-05 RX ADMIN — DIVALPROEX SODIUM 125 MG: 125 CAPSULE ORAL at 08:59

## 2025-06-05 RX ADMIN — MEROPENEM 1000 MG: 1 INJECTION INTRAVENOUS at 12:32

## 2025-06-05 RX ADMIN — PANTOPRAZOLE SODIUM 40 MG: 40 TABLET, DELAYED RELEASE ORAL at 14:20

## 2025-06-05 RX ADMIN — PANTOPRAZOLE SODIUM 40 MG: 40 TABLET, DELAYED RELEASE ORAL at 04:49

## 2025-06-05 RX ADMIN — DULOXETINE 120 MG: 60 CAPSULE, DELAYED RELEASE ORAL at 08:59

## 2025-06-05 RX ADMIN — TRAZODONE HYDROCHLORIDE 200 MG: 100 TABLET ORAL at 19:37

## 2025-06-05 RX ADMIN — OXYCODONE 10 MG: 5 TABLET ORAL at 19:37

## 2025-06-05 RX ADMIN — BUDESONIDE AND FORMOTEROL FUMARATE DIHYDRATE 2 PUFF: 80; 4.5 AEROSOL RESPIRATORY (INHALATION) at 08:10

## 2025-06-05 RX ADMIN — SODIUM CHLORIDE, PRESERVATIVE FREE 10 ML: 5 INJECTION INTRAVENOUS at 08:50

## 2025-06-05 RX ADMIN — MEROPENEM 1000 MG: 1 INJECTION INTRAVENOUS at 04:48

## 2025-06-05 RX ADMIN — TIOTROPIUM BROMIDE INHALATION SPRAY 2 PUFF: 3.12 SPRAY, METERED RESPIRATORY (INHALATION) at 08:10

## 2025-06-05 RX ADMIN — OXYCODONE 10 MG: 5 TABLET ORAL at 07:19

## 2025-06-05 RX ADMIN — DIVALPROEX SODIUM 125 MG: 125 CAPSULE ORAL at 19:37

## 2025-06-05 RX ADMIN — TROSPIUM CHLORIDE 20 MG: 20 TABLET, FILM COATED ORAL at 04:49

## 2025-06-05 RX ADMIN — ROPINIROLE HYDROCHLORIDE 1 MG: 1 TABLET, FILM COATED ORAL at 19:37

## 2025-06-05 RX ADMIN — SODIUM CHLORIDE, PRESERVATIVE FREE 10 ML: 5 INJECTION INTRAVENOUS at 19:37

## 2025-06-05 RX ADMIN — MEROPENEM 1000 MG: 1 INJECTION INTRAVENOUS at 19:57

## 2025-06-05 RX ADMIN — ATORVASTATIN CALCIUM 40 MG: 40 TABLET, FILM COATED ORAL at 19:37

## 2025-06-05 RX ADMIN — DOCUSATE SODIUM 100 MG: 100 CAPSULE, LIQUID FILLED ORAL at 09:00

## 2025-06-05 RX ADMIN — OXYCODONE 10 MG: 5 TABLET ORAL at 14:20

## 2025-06-05 RX ADMIN — LATANOPROST 1 DROP: 50 SOLUTION OPHTHALMIC at 19:37

## 2025-06-05 RX ADMIN — BUPROPION HYDROCHLORIDE 300 MG: 300 TABLET, EXTENDED RELEASE ORAL at 08:59

## 2025-06-05 RX ADMIN — TROSPIUM CHLORIDE 20 MG: 20 TABLET, FILM COATED ORAL at 16:43

## 2025-06-05 ASSESSMENT — PAIN - FUNCTIONAL ASSESSMENT: PAIN_FUNCTIONAL_ASSESSMENT: ACTIVITIES ARE NOT PREVENTED

## 2025-06-05 ASSESSMENT — PAIN SCALES - GENERAL
PAINLEVEL_OUTOF10: 6
PAINLEVEL_OUTOF10: 2
PAINLEVEL_OUTOF10: 8

## 2025-06-05 ASSESSMENT — PAIN DESCRIPTION - DESCRIPTORS
DESCRIPTORS: ACHING
DESCRIPTORS: ACHING
DESCRIPTORS: DISCOMFORT

## 2025-06-05 ASSESSMENT — PAIN DESCRIPTION - ORIENTATION
ORIENTATION: LEFT

## 2025-06-05 ASSESSMENT — PAIN DESCRIPTION - LOCATION
LOCATION: FLANK

## 2025-06-05 ASSESSMENT — PAIN SCALES - WONG BAKER: WONGBAKER_NUMERICALRESPONSE: NO HURT

## 2025-06-06 ENCOUNTER — APPOINTMENT (OUTPATIENT)
Dept: CT IMAGING | Age: 70
DRG: 920 | End: 2025-06-06
Payer: MEDICARE

## 2025-06-06 LAB
ANION GAP SERPL CALC-SCNC: 12 MEQ/L (ref 8–16)
BASOPHILS ABSOLUTE: 0 THOU/MM3 (ref 0–0.1)
BASOPHILS NFR BLD AUTO: 0.7 %
BUN SERPL-MCNC: 15 MG/DL (ref 8–23)
CALCIUM SERPL-MCNC: 10.2 MG/DL (ref 8.8–10.2)
CHLORIDE SERPL-SCNC: 99 MEQ/L (ref 98–111)
CO2 SERPL-SCNC: 25 MEQ/L (ref 22–29)
CREAT SERPL-MCNC: 0.7 MG/DL (ref 0.5–0.9)
DEPRECATED RDW RBC AUTO: 47.5 FL (ref 35–45)
EOSINOPHIL NFR BLD AUTO: 2 %
EOSINOPHILS ABSOLUTE: 0.1 THOU/MM3 (ref 0–0.4)
ERYTHROCYTE [DISTWIDTH] IN BLOOD BY AUTOMATED COUNT: 15.9 % (ref 11.5–14.5)
GFR SERPL CREATININE-BSD FRML MDRD: > 90 ML/MIN/1.73M2
GLUCOSE BLD STRIP.AUTO-MCNC: 128 MG/DL (ref 70–108)
GLUCOSE BLD STRIP.AUTO-MCNC: 86 MG/DL (ref 70–108)
GLUCOSE BLD STRIP.AUTO-MCNC: 90 MG/DL (ref 70–108)
GLUCOSE BLD STRIP.AUTO-MCNC: 97 MG/DL (ref 70–108)
GLUCOSE SERPL-MCNC: 103 MG/DL (ref 74–109)
HCT VFR BLD AUTO: 35.8 % (ref 37–47)
HGB BLD-MCNC: 10.9 GM/DL (ref 12–16)
IMM GRANULOCYTES # BLD AUTO: 0.16 THOU/MM3 (ref 0–0.07)
IMM GRANULOCYTES NFR BLD AUTO: 2.9 %
LYMPHOCYTES ABSOLUTE: 1.4 THOU/MM3 (ref 1–4.8)
LYMPHOCYTES NFR BLD AUTO: 24.8 %
MCH RBC QN AUTO: 25.2 PG (ref 26–33)
MCHC RBC AUTO-ENTMCNC: 30.4 GM/DL (ref 32.2–35.5)
MCV RBC AUTO: 82.7 FL (ref 81–99)
MISC. #1 REFERENCE GROUP TEST: NORMAL
MONOCYTES ABSOLUTE: 0.3 THOU/MM3 (ref 0.4–1.3)
MONOCYTES NFR BLD AUTO: 6.2 %
NEUTROPHILS ABSOLUTE: 3.5 THOU/MM3 (ref 1.8–7.7)
NEUTROPHILS NFR BLD AUTO: 63.4 %
NRBC BLD AUTO-RTO: 0 /100 WBC
PLATELET # BLD AUTO: 413 THOU/MM3 (ref 130–400)
PMV BLD AUTO: 7.9 FL (ref 9.4–12.4)
POTASSIUM SERPL-SCNC: 4.6 MEQ/L (ref 3.5–5.2)
RBC # BLD AUTO: 4.33 MILL/MM3 (ref 4.2–5.4)
SODIUM SERPL-SCNC: 136 MEQ/L (ref 135–145)
WBC # BLD AUTO: 5.5 THOU/MM3 (ref 4.8–10.8)

## 2025-06-06 PROCEDURE — 94761 N-INVAS EAR/PLS OXIMETRY MLT: CPT

## 2025-06-06 PROCEDURE — 6370000000 HC RX 637 (ALT 250 FOR IP): Performed by: PHYSICIAN ASSISTANT

## 2025-06-06 PROCEDURE — 2580000003 HC RX 258: Performed by: PHYSICIAN ASSISTANT

## 2025-06-06 PROCEDURE — 82948 REAGENT STRIP/BLOOD GLUCOSE: CPT

## 2025-06-06 PROCEDURE — 94640 AIRWAY INHALATION TREATMENT: CPT

## 2025-06-06 PROCEDURE — 6360000002 HC RX W HCPCS

## 2025-06-06 PROCEDURE — 6370000000 HC RX 637 (ALT 250 FOR IP)

## 2025-06-06 PROCEDURE — 85025 COMPLETE CBC W/AUTO DIFF WBC: CPT

## 2025-06-06 PROCEDURE — 74176 CT ABD & PELVIS W/O CONTRAST: CPT

## 2025-06-06 PROCEDURE — 6360000002 HC RX W HCPCS: Performed by: PHYSICIAN ASSISTANT

## 2025-06-06 PROCEDURE — 2500000003 HC RX 250 WO HCPCS

## 2025-06-06 PROCEDURE — 80048 BASIC METABOLIC PNL TOTAL CA: CPT

## 2025-06-06 PROCEDURE — 1200000000 HC SEMI PRIVATE

## 2025-06-06 PROCEDURE — 36415 COLL VENOUS BLD VENIPUNCTURE: CPT

## 2025-06-06 PROCEDURE — 99232 SBSQ HOSP IP/OBS MODERATE 35: CPT | Performed by: NURSE PRACTITIONER

## 2025-06-06 RX ADMIN — DIVALPROEX SODIUM 125 MG: 125 CAPSULE ORAL at 20:49

## 2025-06-06 RX ADMIN — OXYCODONE 10 MG: 5 TABLET ORAL at 13:32

## 2025-06-06 RX ADMIN — ROPINIROLE HYDROCHLORIDE 1 MG: 1 TABLET, FILM COATED ORAL at 20:49

## 2025-06-06 RX ADMIN — ATORVASTATIN CALCIUM 40 MG: 40 TABLET, FILM COATED ORAL at 20:49

## 2025-06-06 RX ADMIN — AMLODIPINE BESYLATE 5 MG: 5 TABLET ORAL at 08:36

## 2025-06-06 RX ADMIN — OXYCODONE 10 MG: 5 TABLET ORAL at 04:53

## 2025-06-06 RX ADMIN — LATANOPROST 1 DROP: 50 SOLUTION OPHTHALMIC at 20:58

## 2025-06-06 RX ADMIN — TROSPIUM CHLORIDE 20 MG: 20 TABLET, FILM COATED ORAL at 19:04

## 2025-06-06 RX ADMIN — TIOTROPIUM BROMIDE INHALATION SPRAY 2 PUFF: 3.12 SPRAY, METERED RESPIRATORY (INHALATION) at 07:57

## 2025-06-06 RX ADMIN — TRAZODONE HYDROCHLORIDE 200 MG: 100 TABLET ORAL at 20:48

## 2025-06-06 RX ADMIN — INSULIN GLARGINE 10 UNITS: 100 INJECTION, SOLUTION SUBCUTANEOUS at 20:49

## 2025-06-06 RX ADMIN — MEROPENEM 1000 MG: 1 INJECTION INTRAVENOUS at 12:03

## 2025-06-06 RX ADMIN — BUDESONIDE AND FORMOTEROL FUMARATE DIHYDRATE 2 PUFF: 80; 4.5 AEROSOL RESPIRATORY (INHALATION) at 17:29

## 2025-06-06 RX ADMIN — BUDESONIDE AND FORMOTEROL FUMARATE DIHYDRATE 2 PUFF: 80; 4.5 AEROSOL RESPIRATORY (INHALATION) at 07:57

## 2025-06-06 RX ADMIN — FENOFIBRATE 160 MG: 160 TABLET ORAL at 08:36

## 2025-06-06 RX ADMIN — PANTOPRAZOLE SODIUM 40 MG: 40 TABLET, DELAYED RELEASE ORAL at 04:33

## 2025-06-06 RX ADMIN — ENOXAPARIN SODIUM 40 MG: 100 INJECTION SUBCUTANEOUS at 08:43

## 2025-06-06 RX ADMIN — FERROUS SULFATE TAB 325 MG (65 MG ELEMENTAL FE) 325 MG: 325 (65 FE) TAB at 08:37

## 2025-06-06 RX ADMIN — DIVALPROEX SODIUM 125 MG: 125 CAPSULE ORAL at 08:36

## 2025-06-06 RX ADMIN — DOCUSATE SODIUM 100 MG: 100 CAPSULE, LIQUID FILLED ORAL at 08:42

## 2025-06-06 RX ADMIN — CARVEDILOL 3.12 MG: 3.12 TABLET, FILM COATED ORAL at 19:03

## 2025-06-06 RX ADMIN — SODIUM CHLORIDE, PRESERVATIVE FREE 10 ML: 5 INJECTION INTRAVENOUS at 20:48

## 2025-06-06 RX ADMIN — TIOTROPIUM BROMIDE INHALATION SPRAY 2 PUFF: 3.12 SPRAY, METERED RESPIRATORY (INHALATION) at 17:28

## 2025-06-06 RX ADMIN — DOCUSATE SODIUM 100 MG: 100 CAPSULE, LIQUID FILLED ORAL at 20:48

## 2025-06-06 RX ADMIN — BUPROPION HYDROCHLORIDE 300 MG: 300 TABLET, EXTENDED RELEASE ORAL at 08:37

## 2025-06-06 RX ADMIN — DULOXETINE 120 MG: 60 CAPSULE, DELAYED RELEASE ORAL at 08:36

## 2025-06-06 RX ADMIN — OXYCODONE 10 MG: 5 TABLET ORAL at 19:07

## 2025-06-06 RX ADMIN — TROSPIUM CHLORIDE 20 MG: 20 TABLET, FILM COATED ORAL at 04:33

## 2025-06-06 RX ADMIN — OXYCODONE 10 MG: 5 TABLET ORAL at 08:43

## 2025-06-06 RX ADMIN — LEVOTHYROXINE SODIUM 75 MCG: 0.07 TABLET ORAL at 04:33

## 2025-06-06 RX ADMIN — CARVEDILOL 3.12 MG: 3.12 TABLET, FILM COATED ORAL at 08:36

## 2025-06-06 RX ADMIN — SODIUM CHLORIDE, PRESERVATIVE FREE 10 ML: 5 INJECTION INTRAVENOUS at 08:21

## 2025-06-06 RX ADMIN — MEROPENEM 1000 MG: 1 INJECTION INTRAVENOUS at 04:31

## 2025-06-06 RX ADMIN — AMLODIPINE BESYLATE 5 MG: 5 TABLET ORAL at 20:49

## 2025-06-06 RX ADMIN — MEROPENEM 1000 MG: 1 INJECTION INTRAVENOUS at 20:48

## 2025-06-06 ASSESSMENT — PAIN DESCRIPTION - LOCATION
LOCATION: BACK
LOCATION: BACK
LOCATION: FLANK

## 2025-06-06 ASSESSMENT — PAIN DESCRIPTION - ORIENTATION
ORIENTATION: LEFT
ORIENTATION: LEFT

## 2025-06-06 ASSESSMENT — PAIN SCALES - GENERAL
PAINLEVEL_OUTOF10: 7
PAINLEVEL_OUTOF10: 9
PAINLEVEL_OUTOF10: 7
PAINLEVEL_OUTOF10: 5
PAINLEVEL_OUTOF10: 4
PAINLEVEL_OUTOF10: 4
PAINLEVEL_OUTOF10: 7

## 2025-06-06 ASSESSMENT — PAIN DESCRIPTION - PAIN TYPE: TYPE: CHRONIC PAIN

## 2025-06-06 ASSESSMENT — PAIN DESCRIPTION - DESCRIPTORS
DESCRIPTORS: ACHING

## 2025-06-07 LAB
ANION GAP SERPL CALC-SCNC: 11 MEQ/L (ref 8–16)
BASOPHILS ABSOLUTE: 0 THOU/MM3 (ref 0–0.1)
BASOPHILS NFR BLD AUTO: 0.7 %
BUN SERPL-MCNC: 20 MG/DL (ref 8–23)
CALCIUM SERPL-MCNC: 10.5 MG/DL (ref 8.8–10.2)
CHLORIDE SERPL-SCNC: 101 MEQ/L (ref 98–111)
CO2 SERPL-SCNC: 25 MEQ/L (ref 22–29)
CREAT SERPL-MCNC: 0.8 MG/DL (ref 0.5–0.9)
DEPRECATED RDW RBC AUTO: 48.4 FL (ref 35–45)
EOSINOPHIL NFR BLD AUTO: 1.5 %
EOSINOPHILS ABSOLUTE: 0.1 THOU/MM3 (ref 0–0.4)
ERYTHROCYTE [DISTWIDTH] IN BLOOD BY AUTOMATED COUNT: 16.2 % (ref 11.5–14.5)
GFR SERPL CREATININE-BSD FRML MDRD: 80 ML/MIN/1.73M2
GLUCOSE BLD STRIP.AUTO-MCNC: 104 MG/DL (ref 70–108)
GLUCOSE BLD STRIP.AUTO-MCNC: 119 MG/DL (ref 70–108)
GLUCOSE BLD STRIP.AUTO-MCNC: 130 MG/DL (ref 70–108)
GLUCOSE BLD STRIP.AUTO-MCNC: 98 MG/DL (ref 70–108)
GLUCOSE SERPL-MCNC: 92 MG/DL (ref 74–109)
HCT VFR BLD AUTO: 36.4 % (ref 37–47)
HGB BLD-MCNC: 11 GM/DL (ref 12–16)
IMM GRANULOCYTES # BLD AUTO: 0.09 THOU/MM3 (ref 0–0.07)
IMM GRANULOCYTES NFR BLD AUTO: 1.5 %
LYMPHOCYTES ABSOLUTE: 1.5 THOU/MM3 (ref 1–4.8)
LYMPHOCYTES NFR BLD AUTO: 26.5 %
MCH RBC QN AUTO: 25.2 PG (ref 26–33)
MCHC RBC AUTO-ENTMCNC: 30.2 GM/DL (ref 32.2–35.5)
MCV RBC AUTO: 83.5 FL (ref 81–99)
MONOCYTES ABSOLUTE: 0.3 THOU/MM3 (ref 0.4–1.3)
MONOCYTES NFR BLD AUTO: 5.3 %
NEUTROPHILS ABSOLUTE: 3.7 THOU/MM3 (ref 1.8–7.7)
NEUTROPHILS NFR BLD AUTO: 64.5 %
NRBC BLD AUTO-RTO: 0 /100 WBC
PLATELET # BLD AUTO: 408 THOU/MM3 (ref 130–400)
PMV BLD AUTO: 7.9 FL (ref 9.4–12.4)
POTASSIUM SERPL-SCNC: 5.1 MEQ/L (ref 3.5–5.2)
RBC # BLD AUTO: 4.36 MILL/MM3 (ref 4.2–5.4)
SODIUM SERPL-SCNC: 137 MEQ/L (ref 135–145)
WBC # BLD AUTO: 5.8 THOU/MM3 (ref 4.8–10.8)

## 2025-06-07 PROCEDURE — 1200000000 HC SEMI PRIVATE

## 2025-06-07 PROCEDURE — 6360000002 HC RX W HCPCS

## 2025-06-07 PROCEDURE — 82948 REAGENT STRIP/BLOOD GLUCOSE: CPT

## 2025-06-07 PROCEDURE — 99232 SBSQ HOSP IP/OBS MODERATE 35: CPT | Performed by: NURSE PRACTITIONER

## 2025-06-07 PROCEDURE — 2500000003 HC RX 250 WO HCPCS: Performed by: NURSE PRACTITIONER

## 2025-06-07 PROCEDURE — 6370000000 HC RX 637 (ALT 250 FOR IP): Performed by: NURSE PRACTITIONER

## 2025-06-07 PROCEDURE — 6360000002 HC RX W HCPCS: Performed by: PHYSICIAN ASSISTANT

## 2025-06-07 PROCEDURE — 2580000003 HC RX 258: Performed by: PHYSICIAN ASSISTANT

## 2025-06-07 PROCEDURE — 80048 BASIC METABOLIC PNL TOTAL CA: CPT

## 2025-06-07 PROCEDURE — 36415 COLL VENOUS BLD VENIPUNCTURE: CPT

## 2025-06-07 PROCEDURE — 6360000002 HC RX W HCPCS: Performed by: NURSE PRACTITIONER

## 2025-06-07 PROCEDURE — 94640 AIRWAY INHALATION TREATMENT: CPT

## 2025-06-07 PROCEDURE — 2500000003 HC RX 250 WO HCPCS

## 2025-06-07 PROCEDURE — 6370000000 HC RX 637 (ALT 250 FOR IP)

## 2025-06-07 PROCEDURE — 6370000000 HC RX 637 (ALT 250 FOR IP): Performed by: PHYSICIAN ASSISTANT

## 2025-06-07 PROCEDURE — 85025 COMPLETE CBC W/AUTO DIFF WBC: CPT

## 2025-06-07 RX ORDER — POLYETHYLENE GLYCOL 3350 17 G/17G
17 POWDER, FOR SOLUTION ORAL DAILY
Status: DISCONTINUED | OUTPATIENT
Start: 2025-06-07 | End: 2025-06-08 | Stop reason: HOSPADM

## 2025-06-07 RX ADMIN — CARVEDILOL 3.12 MG: 3.12 TABLET, FILM COATED ORAL at 09:43

## 2025-06-07 RX ADMIN — OXYCODONE 10 MG: 5 TABLET ORAL at 09:49

## 2025-06-07 RX ADMIN — DOCUSATE SODIUM 100 MG: 100 CAPSULE, LIQUID FILLED ORAL at 09:43

## 2025-06-07 RX ADMIN — MICONAZOLE NITRATE: 2 POWDER TOPICAL at 20:25

## 2025-06-07 RX ADMIN — ONDANSETRON 4 MG: 2 INJECTION, SOLUTION INTRAMUSCULAR; INTRAVENOUS at 18:02

## 2025-06-07 RX ADMIN — SODIUM CHLORIDE, PRESERVATIVE FREE 10 ML: 5 INJECTION INTRAVENOUS at 20:25

## 2025-06-07 RX ADMIN — TRAZODONE HYDROCHLORIDE 200 MG: 100 TABLET ORAL at 20:25

## 2025-06-07 RX ADMIN — PANTOPRAZOLE SODIUM 40 MG: 40 TABLET, DELAYED RELEASE ORAL at 15:44

## 2025-06-07 RX ADMIN — OXYCODONE 10 MG: 5 TABLET ORAL at 04:14

## 2025-06-07 RX ADMIN — SODIUM CHLORIDE, PRESERVATIVE FREE 10 ML: 5 INJECTION INTRAVENOUS at 09:44

## 2025-06-07 RX ADMIN — TIOTROPIUM BROMIDE INHALATION SPRAY 2 PUFF: 3.12 SPRAY, METERED RESPIRATORY (INHALATION) at 07:42

## 2025-06-07 RX ADMIN — DOCUSATE SODIUM 100 MG: 100 CAPSULE, LIQUID FILLED ORAL at 20:25

## 2025-06-07 RX ADMIN — ATORVASTATIN CALCIUM 40 MG: 40 TABLET, FILM COATED ORAL at 20:25

## 2025-06-07 RX ADMIN — MEROPENEM 1000 MG: 1 INJECTION INTRAVENOUS at 20:30

## 2025-06-07 RX ADMIN — ROPINIROLE HYDROCHLORIDE 1 MG: 1 TABLET, FILM COATED ORAL at 20:25

## 2025-06-07 RX ADMIN — TROSPIUM CHLORIDE 20 MG: 20 TABLET, FILM COATED ORAL at 15:45

## 2025-06-07 RX ADMIN — MEROPENEM 1000 MG: 1 INJECTION INTRAVENOUS at 04:19

## 2025-06-07 RX ADMIN — DULOXETINE 120 MG: 60 CAPSULE, DELAYED RELEASE ORAL at 09:43

## 2025-06-07 RX ADMIN — MICONAZOLE NITRATE: 2 POWDER TOPICAL at 14:02

## 2025-06-07 RX ADMIN — LEVOTHYROXINE SODIUM 75 MCG: 0.07 TABLET ORAL at 06:04

## 2025-06-07 RX ADMIN — MAGNESIUM HYDROXIDE 30 ML: 1200 LIQUID ORAL at 20:28

## 2025-06-07 RX ADMIN — LATANOPROST 1 DROP: 50 SOLUTION OPHTHALMIC at 20:26

## 2025-06-07 RX ADMIN — DIVALPROEX SODIUM 125 MG: 125 CAPSULE ORAL at 09:43

## 2025-06-07 RX ADMIN — PANTOPRAZOLE SODIUM 40 MG: 40 TABLET, DELAYED RELEASE ORAL at 06:03

## 2025-06-07 RX ADMIN — TROSPIUM CHLORIDE 20 MG: 20 TABLET, FILM COATED ORAL at 06:03

## 2025-06-07 RX ADMIN — OXYCODONE 10 MG: 5 TABLET ORAL at 15:44

## 2025-06-07 RX ADMIN — ENOXAPARIN SODIUM 40 MG: 100 INJECTION SUBCUTANEOUS at 09:43

## 2025-06-07 RX ADMIN — DIVALPROEX SODIUM 125 MG: 125 CAPSULE ORAL at 20:25

## 2025-06-07 RX ADMIN — CARVEDILOL 3.12 MG: 3.12 TABLET, FILM COATED ORAL at 15:44

## 2025-06-07 RX ADMIN — BUDESONIDE AND FORMOTEROL FUMARATE DIHYDRATE 2 PUFF: 80; 4.5 AEROSOL RESPIRATORY (INHALATION) at 07:42

## 2025-06-07 RX ADMIN — FENOFIBRATE 160 MG: 160 TABLET ORAL at 09:43

## 2025-06-07 RX ADMIN — AMLODIPINE BESYLATE 5 MG: 5 TABLET ORAL at 20:25

## 2025-06-07 RX ADMIN — MEROPENEM 1000 MG: 1 INJECTION INTRAVENOUS at 12:12

## 2025-06-07 RX ADMIN — BUPROPION HYDROCHLORIDE 300 MG: 300 TABLET, EXTENDED RELEASE ORAL at 09:43

## 2025-06-07 RX ADMIN — POLYETHYLENE GLYCOL 3350 17 G: 17 POWDER, FOR SOLUTION ORAL at 12:09

## 2025-06-07 RX ADMIN — AMLODIPINE BESYLATE 5 MG: 5 TABLET ORAL at 09:43

## 2025-06-07 ASSESSMENT — PAIN SCALES - GENERAL
PAINLEVEL_OUTOF10: 7
PAINLEVEL_OUTOF10: 8
PAINLEVEL_OUTOF10: 0
PAINLEVEL_OUTOF10: 6
PAINLEVEL_OUTOF10: 5
PAINLEVEL_OUTOF10: 3

## 2025-06-07 ASSESSMENT — PAIN DESCRIPTION - PAIN TYPE: TYPE: CHRONIC PAIN

## 2025-06-07 ASSESSMENT — PAIN DESCRIPTION - DESCRIPTORS
DESCRIPTORS: ACHING
DESCRIPTORS: ACHING;SHARP

## 2025-06-07 ASSESSMENT — PAIN DESCRIPTION - ORIENTATION
ORIENTATION: RIGHT;LEFT
ORIENTATION: LEFT

## 2025-06-07 ASSESSMENT — PAIN DESCRIPTION - LOCATION
LOCATION: BACK;FLANK
LOCATION: BACK
LOCATION: BACK

## 2025-06-08 VITALS
DIASTOLIC BLOOD PRESSURE: 69 MMHG | SYSTOLIC BLOOD PRESSURE: 143 MMHG | OXYGEN SATURATION: 95 % | RESPIRATION RATE: 20 BRPM | TEMPERATURE: 97.9 F | BODY MASS INDEX: 27.66 KG/M2 | WEIGHT: 162.04 LBS | HEART RATE: 65 BPM | HEIGHT: 64 IN

## 2025-06-08 LAB
ANION GAP SERPL CALC-SCNC: 10 MEQ/L (ref 8–16)
BASOPHILS ABSOLUTE: 0 THOU/MM3 (ref 0–0.1)
BASOPHILS NFR BLD AUTO: 0.8 %
BUN SERPL-MCNC: 21 MG/DL (ref 8–23)
CALCIUM SERPL-MCNC: 10.1 MG/DL (ref 8.8–10.2)
CHLORIDE SERPL-SCNC: 101 MEQ/L (ref 98–111)
CO2 SERPL-SCNC: 24 MEQ/L (ref 22–29)
CREAT SERPL-MCNC: 0.9 MG/DL (ref 0.5–0.9)
DEPRECATED RDW RBC AUTO: 48.8 FL (ref 35–45)
EOSINOPHIL NFR BLD AUTO: 2.1 %
EOSINOPHILS ABSOLUTE: 0.1 THOU/MM3 (ref 0–0.4)
ERYTHROCYTE [DISTWIDTH] IN BLOOD BY AUTOMATED COUNT: 16.4 % (ref 11.5–14.5)
GFR SERPL CREATININE-BSD FRML MDRD: 70 ML/MIN/1.73M2
GLUCOSE BLD STRIP.AUTO-MCNC: 111 MG/DL (ref 70–108)
GLUCOSE BLD STRIP.AUTO-MCNC: 86 MG/DL (ref 70–108)
GLUCOSE SERPL-MCNC: 84 MG/DL (ref 74–109)
HCT VFR BLD AUTO: 36 % (ref 37–47)
HGB BLD-MCNC: 10.9 GM/DL (ref 12–16)
IMM GRANULOCYTES # BLD AUTO: 0.09 THOU/MM3 (ref 0–0.07)
IMM GRANULOCYTES NFR BLD AUTO: 1.5 %
LYMPHOCYTES ABSOLUTE: 1.8 THOU/MM3 (ref 1–4.8)
LYMPHOCYTES NFR BLD AUTO: 30.3 %
MCH RBC QN AUTO: 25.3 PG (ref 26–33)
MCHC RBC AUTO-ENTMCNC: 30.3 GM/DL (ref 32.2–35.5)
MCV RBC AUTO: 83.5 FL (ref 81–99)
MONOCYTES ABSOLUTE: 0.4 THOU/MM3 (ref 0.4–1.3)
MONOCYTES NFR BLD AUTO: 6.7 %
NEUTROPHILS ABSOLUTE: 3.6 THOU/MM3 (ref 1.8–7.7)
NEUTROPHILS NFR BLD AUTO: 58.6 %
NRBC BLD AUTO-RTO: 0 /100 WBC
PLATELET # BLD AUTO: 361 THOU/MM3 (ref 130–400)
PMV BLD AUTO: 7.9 FL (ref 9.4–12.4)
POTASSIUM SERPL-SCNC: 4.9 MEQ/L (ref 3.5–5.2)
RBC # BLD AUTO: 4.31 MILL/MM3 (ref 4.2–5.4)
SODIUM SERPL-SCNC: 135 MEQ/L (ref 135–145)
WBC # BLD AUTO: 6.1 THOU/MM3 (ref 4.8–10.8)

## 2025-06-08 PROCEDURE — 6360000002 HC RX W HCPCS: Performed by: PHYSICIAN ASSISTANT

## 2025-06-08 PROCEDURE — 6370000000 HC RX 637 (ALT 250 FOR IP): Performed by: PHYSICIAN ASSISTANT

## 2025-06-08 PROCEDURE — 36415 COLL VENOUS BLD VENIPUNCTURE: CPT

## 2025-06-08 PROCEDURE — 6370000000 HC RX 637 (ALT 250 FOR IP): Performed by: NURSE PRACTITIONER

## 2025-06-08 PROCEDURE — 6360000002 HC RX W HCPCS

## 2025-06-08 PROCEDURE — 99239 HOSP IP/OBS DSCHRG MGMT >30: CPT | Performed by: NURSE PRACTITIONER

## 2025-06-08 PROCEDURE — 6370000000 HC RX 637 (ALT 250 FOR IP)

## 2025-06-08 PROCEDURE — 85025 COMPLETE CBC W/AUTO DIFF WBC: CPT

## 2025-06-08 PROCEDURE — 2580000003 HC RX 258: Performed by: PHYSICIAN ASSISTANT

## 2025-06-08 PROCEDURE — 80048 BASIC METABOLIC PNL TOTAL CA: CPT

## 2025-06-08 PROCEDURE — 94640 AIRWAY INHALATION TREATMENT: CPT

## 2025-06-08 PROCEDURE — 2500000003 HC RX 250 WO HCPCS

## 2025-06-08 PROCEDURE — 82948 REAGENT STRIP/BLOOD GLUCOSE: CPT

## 2025-06-08 RX ORDER — AMLODIPINE BESYLATE 5 MG/1
5 TABLET ORAL 2 TIMES DAILY
DISCHARGE
Start: 2025-06-08

## 2025-06-08 RX ADMIN — BUPROPION HYDROCHLORIDE 300 MG: 300 TABLET, EXTENDED RELEASE ORAL at 09:17

## 2025-06-08 RX ADMIN — PANTOPRAZOLE SODIUM 40 MG: 40 TABLET, DELAYED RELEASE ORAL at 05:02

## 2025-06-08 RX ADMIN — TROSPIUM CHLORIDE 20 MG: 20 TABLET, FILM COATED ORAL at 16:31

## 2025-06-08 RX ADMIN — CARVEDILOL 3.12 MG: 3.12 TABLET, FILM COATED ORAL at 16:32

## 2025-06-08 RX ADMIN — FERROUS SULFATE TAB 325 MG (65 MG ELEMENTAL FE) 325 MG: 325 (65 FE) TAB at 09:18

## 2025-06-08 RX ADMIN — DULOXETINE 120 MG: 60 CAPSULE, DELAYED RELEASE ORAL at 09:18

## 2025-06-08 RX ADMIN — MEROPENEM 1000 MG: 1 INJECTION INTRAVENOUS at 03:30

## 2025-06-08 RX ADMIN — MICONAZOLE NITRATE: 2 POWDER TOPICAL at 09:19

## 2025-06-08 RX ADMIN — LEVOTHYROXINE SODIUM 150 MCG: 0.15 TABLET ORAL at 05:02

## 2025-06-08 RX ADMIN — ALBUTEROL SULFATE 2.5 MG: 2.5 SOLUTION RESPIRATORY (INHALATION) at 12:42

## 2025-06-08 RX ADMIN — PANTOPRAZOLE SODIUM 40 MG: 40 TABLET, DELAYED RELEASE ORAL at 16:31

## 2025-06-08 RX ADMIN — MEROPENEM 1000 MG: 1 INJECTION INTRAVENOUS at 11:56

## 2025-06-08 RX ADMIN — TROSPIUM CHLORIDE 20 MG: 20 TABLET, FILM COATED ORAL at 05:02

## 2025-06-08 RX ADMIN — OXYCODONE 10 MG: 5 TABLET ORAL at 13:58

## 2025-06-08 RX ADMIN — DIVALPROEX SODIUM 125 MG: 125 CAPSULE ORAL at 09:17

## 2025-06-08 RX ADMIN — AMLODIPINE BESYLATE 5 MG: 5 TABLET ORAL at 09:17

## 2025-06-08 RX ADMIN — ENOXAPARIN SODIUM 40 MG: 100 INJECTION SUBCUTANEOUS at 09:18

## 2025-06-08 RX ADMIN — LEVOTHYROXINE SODIUM 75 MCG: 0.07 TABLET ORAL at 05:02

## 2025-06-08 RX ADMIN — TIOTROPIUM BROMIDE INHALATION SPRAY 2 PUFF: 3.12 SPRAY, METERED RESPIRATORY (INHALATION) at 08:19

## 2025-06-08 RX ADMIN — FENOFIBRATE 160 MG: 160 TABLET ORAL at 09:18

## 2025-06-08 RX ADMIN — OXYCODONE 10 MG: 5 TABLET ORAL at 09:19

## 2025-06-08 RX ADMIN — OXYCODONE 10 MG: 5 TABLET ORAL at 01:28

## 2025-06-08 RX ADMIN — BUDESONIDE AND FORMOTEROL FUMARATE DIHYDRATE 2 PUFF: 80; 4.5 AEROSOL RESPIRATORY (INHALATION) at 08:19

## 2025-06-08 RX ADMIN — DOCUSATE SODIUM 100 MG: 100 CAPSULE, LIQUID FILLED ORAL at 09:18

## 2025-06-08 RX ADMIN — SODIUM CHLORIDE, PRESERVATIVE FREE 10 ML: 5 INJECTION INTRAVENOUS at 09:20

## 2025-06-08 RX ADMIN — POLYETHYLENE GLYCOL 3350 17 G: 17 POWDER, FOR SOLUTION ORAL at 09:19

## 2025-06-08 ASSESSMENT — PAIN DESCRIPTION - LOCATION
LOCATION: BACK
LOCATION: ABDOMEN;FLANK
LOCATION: BACK

## 2025-06-08 ASSESSMENT — PAIN DESCRIPTION - ORIENTATION
ORIENTATION: MID
ORIENTATION: RIGHT;LEFT;LOWER;MID
ORIENTATION: LEFT

## 2025-06-08 ASSESSMENT — PAIN SCALES - WONG BAKER: WONGBAKER_NUMERICALRESPONSE: NO HURT

## 2025-06-08 ASSESSMENT — PAIN DESCRIPTION - DESCRIPTORS
DESCRIPTORS: ACHING;SHARP
DESCRIPTORS: ACHING
DESCRIPTORS: ACHING

## 2025-06-08 ASSESSMENT — PAIN SCALES - GENERAL
PAINLEVEL_OUTOF10: 4
PAINLEVEL_OUTOF10: 5
PAINLEVEL_OUTOF10: 4
PAINLEVEL_OUTOF10: 8
PAINLEVEL_OUTOF10: 7
PAINLEVEL_OUTOF10: 7

## 2025-06-08 ASSESSMENT — PAIN - FUNCTIONAL ASSESSMENT
PAIN_FUNCTIONAL_ASSESSMENT: PREVENTS OR INTERFERES WITH MANY ACTIVE NOT PASSIVE ACTIVITIES
PAIN_FUNCTIONAL_ASSESSMENT: PREVENTS OR INTERFERES SOME ACTIVE ACTIVITIES AND ADLS
PAIN_FUNCTIONAL_ASSESSMENT: ACTIVITIES ARE NOT PREVENTED

## 2025-06-08 NOTE — PROGRESS NOTES
Progress note: Infectious diseases    Patient - Elli Coulter,  Age - 69 y.o.    - 1955      Room Number - 6K-12/012-A   MRN -  [id number]   [facility] # - [id number]  Date of Admission -  2025  4:00 PM    SUBJECTIVE:   No new issues.     OBJECTIVE   VITALS    height is 1.626 m (5' 4\") and weight is 73.5 kg (162 lb 0.6 oz). Her oral temperature is 98 °F (36.7 °C). Her blood pressure is 134/66 and her pulse is 59. Her respiration is 22 and oxygen saturation is 95%.       Wt Readings from Last 3 Encounters:   25 73.5 kg (162 lb 0.6 oz)   25 83 kg (183 lb)   25 83.3 kg (183 lb 10.3 oz)       I/O (24 Hours)    Intake/Output Summary (Last 24 hours) at 2025 1049  Last data filed at 2025 0902  Gross per 24 hour   Intake 1236 ml   Output --   Net 1236 ml       General Appearance  Awake, alert, oriented,  not  In acute distress  HEENT - normocephalic, atraumatic, pale  conjunctiva,  anicteric sclera  Neck - Supple, no mass  Lungs -  Bilateral  air entry, no rhonchi, no wheeze  Cardiovascular - Heart sounds are normal.     Abdomen - soft, not distended, nontender, drain on the left flank, chronically swollen left flank area,no redness  Neurologic -oriented  Skin - No bruising or bleeding  Extremities - No edema, no cyanosis, clubbing     MEDICATIONS:      polyethylene glycol  17 g Oral Daily    miconazole   Topical BID    meropenem  1,000 mg IntraVENous Q8H    sodium chloride flush  5-40 mL IntraVENous 2 times per day    enoxaparin  40 mg SubCUTAneous Daily    atorvastatin  40 mg Oral Nightly    buPROPion  300 mg Oral QAM    carvedilol  3.125 mg Oral BID WC    divalproex  125 mg Oral BID    docusate sodium  100 mg Oral BID    DULoxetine  120 mg Oral Daily    fenofibrate  160 mg Oral Daily    ferrous sulfate  325 mg Oral Every Other Day    budesonide-formoterol  2 puff Inhalation BID RT    And    
    Hospitalist Progress Note      Patient:  Elli Coulter 69 y.o. female       : 1955  Unit/Bed:-12/012-A    Date of Admission: 2025      ASSESSMENT AND PLAN    Active Problems  Perinephritic fluid collection, recurrent    CT AP (personally reviewed) shows a fluid collection in the left renal fossa. History of left nephrectomy.   Pt has a history of  abscesses. Previous cultures have grown Proteus mirabilis.   Plan for IR guided drain placement.   Continue IV Zosyn (start ).   Urology following, notes reviewed.   NPO at MN. Pain control. Consider ID consult in AM.   Hypoosmolar hyponatremia, chronic  Likely hypovolemic, d/t poor po intake. S/p IVF overnight. Repeat Na and decide on further fluids.   UTI  UA positive for leuks, bacteria.  Patient reports dysuria.  Continue with Zosyn, follow urine culture  Physical debility  PT/OT/SW. From ECF     Chronic Conditions (reviewed, stable, and home medications resumed, unless otherwise stated)  CKD stage IIIA  IDDMII  Essential hypertension  Iron deficiency anemia  GERD  Hypothyroidism  History of PE/DVT  Bipolar 1 disorder  History of polysubstance abuse      LDA: []CVC / []PICC / []Midline / []Prater / []Drains / []Mediport / []None  Antibiotics: yes  Steroids: no  Labs (still needed?): [x]Yes / []No  IVF (still needed?): []Yes / [x]No    Level of care: []Step Down / [x]Med-Surg  Bed Status: [x]Inpatient / []Observation  Telemetry: [x]Yes / []No  PT/OT: [x]Yes / []No    DVT Prophylaxis: [x] Lovenox / [] Heparin / [] SCDs / [] Already on Systemic Anticoagulation / [] None     Expected discharge date:  tbd  Disposition: ECF     Code status: Full Code     ===================================================================    Chief Complaint: flank pain   Subjective (past 24 hours):   Patient reports continued flank pain, has significant tenderness on exam.  She denies fever or chills.  Reports mild shortness of breath, which is chronic.  No chest 
    Hospitalist Progress Note      Patient:  Elli Coulter 69 y.o. female       : 1955  Unit/Bed:-12/012-A    Date of Admission: 2025      ASSESSMENT AND PLAN    Active Problems  Perinephritic fluid collection, recurrent    CT AP (personally reviewed) shows a fluid collection in the left renal fossa. History of left nephrectomy.   Pt has a history of  abscesses. Previous cultures have grown Proteus mirabilis.  Continue IV Zosyn (start ).   Discussed with Urology. Plan for CT guided drain placement today. Urology will manage the drain. Consider ID consult when cultures result.   Hypoosmolar hyponatremia, chronic  Likely hypovolemic, d/t poor po intake. S/p IVF overnight. Na stable. Add NS while NPO.   UTI  UA positive for leuks, bacteria.  Patient reports dysuria.  Continue with Zosyn, follow urine culture  Physical debility  PT/OT/SW. From ECF     Chronic Conditions (reviewed, stable, and home medications resumed, unless otherwise stated)  CKD stage IIIA  IDDMII  Essential hypertension  Iron deficiency anemia  GERD  Hypothyroidism  History of PE/DVT  Bipolar 1 disorder  History of polysubstance abuse      LDA: []CVC / []PICC / []Midline / []Prater / []Drains / []Mediport / []None  Antibiotics: yes  Steroids: no  Labs (still needed?): [x]Yes / []No  IVF (still needed?): []Yes / [x]No    Level of care: []Step Down / [x]Med-Surg  Bed Status: [x]Inpatient / []Observation  Telemetry: [x]Yes / []No  PT/OT: [x]Yes / []No    DVT Prophylaxis: [x] Lovenox / [] Heparin / [] SCDs / [] Already on Systemic Anticoagulation / [] None     Expected discharge date:  tbd  Disposition: ECF     Code status: Full Code     ===================================================================    Chief Complaint: flank pain   Subjective (past 24 hours):   Pt ambulating in the room with assistance. Continued left flank pain. No f/c, sob, cp.      Initial HPI 2025 per chart review:  Elli Coulter is a 69 y.o. female 
    Hospitalist Progress Note    Patient:  Elli Coulter      Unit/Bed:6K-12/012-A    YOB: 1955    MRN: [id number]       Acct: [id number]     PCP: Marc De Oliveira MD    Date of Admission: 5/31/2025    Assessment/Plan:    1.  Perinephritic fluid collection, recurrent: Patient is s/p CT-guided drain placement 6/2  CT abdomen and pelvis done on 5/31/2025 showed:Patient is status post left nephrectomy. There is an indeterminate fluid collection demonstrated along the left renal fossa measuring 7.8 x 4.3 cm in the  transverse and AP dimensions respectively as well as 8.4 cm in the craniocaudal dimension. The sterility of this collection is indeterminate, therefore abscess is not excluded. This could also represent a possible seroma. This is located primarily lateral to the left psoas.  Per record review patient was treated with IV Zosyn started on 5/21 transitioned to meropenem on 6/3 for ESBL in urine  ID managing antibiotics awaiting culture report appreciate input    6/5/25  Body fluid culture grew Proteus mirabilis   ID planning to use p.o. antibiotic on discharge see notes    6/6/25  ID  discussed the case patient to have CT abdomen and pelvis without contrast to assess for fluid collection prior to drain removal appreciate input ,order placed    6/7/25  CT abdomen and pelvis showed:A drainage catheter terminates within the left retroperitoneal fluid  collection. Near complete resolution of the fluid collection is observed. Only trace fluid is seen at the tip of the catheter measuring up to 11 mm.  Considerable left flank subcutaneous edema tracks inferiorly to the level of the proximal thighs. No abdominal wall fluid collection is identified. Correlate with direct inspection for abdominal wall cellulitis. Follow-up to ensure resolution is advised    2.  UTI  UA grew ESBL producing Klebsiella  On antibiotic ID managing      3.  Chronic hypoosmolar hyponatremia improved with IV therapy  Current 
    Hospitalist Progress Note    Patient:  Elli Coulter      Unit/Bed:6K-12/012-A    YOB: 1955    MRN: [id number]       Acct: [id number]     PCP: Marc De Oliveira MD    Date of Admission: 5/31/2025    Assessment/Plan:    1.  Perinephritic fluid collection, recurrent: Patient is s/p CT-guided drain placement 6/2  CT abdomen and pelvis done on 5/31/2025 showed:Patient is status post left nephrectomy. There is an indeterminate fluid collection demonstrated along the left renal fossa measuring 7.8 x 4.3 cm in the  transverse and AP dimensions respectively as well as 8.4 cm in the craniocaudal dimension. The sterility of this collection is indeterminate, therefore abscess is not excluded. This could also represent a possible seroma. This is located primarily lateral to the left psoas.  Per record review patient was treated with IV Zosyn started on 5/21 transitioned to meropenem on 6/3 for ESBL in urine  ID managing antibiotics awaiting culture report appreciate input    6/5/25  Body fluid culture grew Proteus mirabilis   ID planning to use p.o. antibiotic on discharge see notes    6/6/25  ID  discussed the case patient to have CT abdomen and pelvis without contrast to assess for fluid collection prior to drain removal appreciate input ,order placed    2.  UTI  UA grew ESBL producing Klebsiella  On antibiotic ID managing      3.  Chronic hypoosmolar hyponatremia improved with IV therapy  Current sodium level 133    4.  Physical deconditioning patient is from F  PT/OT/SW    5. essential hypertension  On amlodipine, carvedilol    6.  Insulin-dependent diabetes mellitus  On Lantus  Insulin sliding scale  Monitor blood sugar [facility]  Hypoglycemia protocol    7.  Chronic kidney disease stage IIIa  Avoid nephrotoxins  Renally dose medications  Monitor BMP    8.  Hypothyroidism  Stable  On levothyroxine    9.  Iron deficiency anemia  Continue iron supplements      10.  GERD    11.  History of PE/DVT    12. 
    Hospitalist Progress Note    Patient:  Elli Coulter      Unit/Bed:6K-12/012-A    YOB: 1955    MRN: [id number]       Acct: [id number]     PCP: Marc De Oliveira MD    Date of Admission: 5/31/2025    Assessment/Plan:    1.  Perinephritic fluid collection, recurrent: Patient is s/p CT-guided drain placement 6/2  CT abdomen and pelvis done on 5/31/2025 showed:Patient is status post left nephrectomy. There is an indeterminate fluid collection demonstrated along the left renal fossa measuring 7.8 x 4.3 cm in the  transverse and AP dimensions respectively as well as 8.4 cm in the craniocaudal dimension. The sterility of this collection is indeterminate, therefore abscess is not excluded. This could also represent a possible seroma. This is located primarily lateral to the left psoas.  Per record review patient was treated with IV Zosyn started on 5/21 transitioned to meropenem on 6/3 for ESBL in urine  ID managing antibiotics awaiting culture report appreciate input    6/5/25  Body fluid culture grew Proteus mirabilis   ID planning to use p.o. antibiotic on discharge see notes    2.  UTI  UA grew ESBL producing Klebsiella  On antibiotic ID managing    3.  Chronic hypoosmolar hyponatremia improved with IV therapy  Current sodium level 133    4.  Physical deconditioning patient is from F  PT/OT/SW    5. essential hypertension  On amlodipine, carvedilol    6.  Insulin-dependent diabetes mellitus  On Lantus  Insulin sliding scale  Monitor blood sugar [facility]  Hypoglycemia protocol    7.  Chronic kidney disease stage IIIa  Avoid nephrotoxins  Renally dose medications  Monitor BMP    8.  Hypothyroidism  Stable  On levothyroxine    9.  Iron deficiency anemia  Continue iron supplements      10.  GERD    11.  History of PE/DVT    12. history of polysubstance abuse    11.  Bipolar disorder  Continue home meds      Anticipated Discharge in : Pending course    Active Hospital Problems    Diagnosis Date Noted    
  Pharmacy Note - Extended Infusion Beta-Lactam Dose Adjustment    Piperacillin/Tazobactam 4500 mg q8h extended infusion ordered for the treatment of Intra-abdominal Infection. Per [facility] Extended Infusion Beta-Lactam Policy, this will be changed to 4500 mg loading dose followed by 3375 mg q8h extended infusion    Estimated Creatinine Clearance: Estimated Creatinine Clearance: 61 mL/min (based on SCr of 0.9 mg/dL).    Dialysis Status, MARILY, CKD: Normal    BMI: Body mass index is 29.18 kg/m².    Rationale for Adjustment: Dose adjusted per [facility] Extended Infusion Policy based on renal function and indication. The above medication is renally eliminated and demonstrates time-dependent effects on bacterial eradication. Extended-infusion dosing strategy aims to enhance microbiologic and clinical efficacy.     Pharmacy will monitor renal function daily and adjust dose as necessary.    Please call with any questions.    Thank you,  Julissa ARECHIGA.Ph., BCPS., 5/31/2025,9:09 PM      
  Physician Progress Note      PATIENT:               PATRICK BUTCHER  CSN #:                  [id number]  :                       1955  ADMIT DATE:       2025 4:00 PM  DISCH DATE:  RESPONDING  PROVIDER #:        Serenity Spears CNP          QUERY TEXT:    Please clarify the following documentation:    The clinical indicators include:  -- H&P from  by Bette Ohara reflects \"Left flank pain secondary to   concern for recurrent left renal abscess vs seroma, status post left   nephrectomy 2024, retroperitoneal drain placed -2025\"  --Progress note from 6/3 by Bette Ohara reflects \"Perinephritic fluid   collection, recurrent\"  --Labs show abscess culture positive proteus species and gram negative bacilli   both with heavy growth  --CT abdomen/pelvis from  showed \" Patient is status post left   nephrectomy. There is an indeterminate fluid collection demonstrated along the   left renal fossa measuring 7.8 x 4.3 cm in the transverse and AP dimensions   respectively as well as 8.4 cm in the craniocaudal dimension. The sterility of   this collection is indeterminate, therefore abscess is not excluded. This   could also represent a possible seroma. This is located primarily lateral to   the left psoas.\"  --CT abscess drainage from  showed \"status post successful abscess   drainage procedure\"  --MAR shows IV Merrem, IV Zosyn was discontinued  Options provided:  -- Perinephritic abscess is related to the nephrectomy  -- Seroma due to previous nephrectomy  -- Other - I will add my own diagnosis  -- Disagree - Not applicable / Not valid  -- Disagree - Clinically unable to determine / Unknown  -- Refer to Clinical Documentation Reviewer    PROVIDER RESPONSE TEXT:    recurrent fluid collection over an old nephrectomy wound bed.    Query created by: Sdynee Pantoja on 2025 11:03 AM      Electronically signed by:  Serenity Spears CNP 2025 4:58 PM          
.[facility]  OCCUPATIONAL THERAPY MISSED TREATMENT NOTE  [facility] RENAL TELEMETRY 6K  6K-12/012-A      Date: 2025  Patient Name: Elli Coulter        CSN: [id number]   : 1955  (69 y.o.)  Gender: female   Referring Practitioner: Bette Ohara APRN - CNP            REASON FOR MISSED TREATMENT: Patient Refused.  Attempted to see pt this pm and she stated she had just gotten back to bed.  Pt was having hard time keeping awake for therapist.  Pt requested to wait till later or tomorrow for therapy.  Told pt was not for sure we could do this but she cont to decline to part in therapy.                   
1615 Patient received in CT scanning for pre-procedure drain insertion assessment with family at bedside. Monitor applied.   1620 Dr. Pablo here; spoke to patient. This procedure has been fully reviewed with the patient and written informed consent has been obtained.  Patient assisted to CT table and pre scan started.   1627 Procedure started with Dr. Pablo.  1630 Samples collected and sent to lab for further review.  Procedure completed; patient tolerated well. Post scan obtained.   1635 Patient on bed; comfort ensured.  1638 Report called to 6K and patient taken to 6K via bed with family at bedside. Pt alert and oriented x3; follows commands. Skin pink, warm, and dry. Respirations easy, regular, and nonlabored.    
[facility]  PHYSICAL THERAPY MISSED TREATMENT NOTE  STRZ RENAL TELEMETRY 6K    Date: 6/3/2025  Patient Name: Elli Coulter        MRN: [id number]   : 1955  (69 y.o.)  Gender: female                REASON FOR MISSED TREATMENT:  Patient refused treatment.  PT provided education on importance of mobility, however patient continued to decline stating she needed rest. PT will check back as able.     
Pharmacy Medication History Note    List of current medications patient is taking is complete.    Source of information: MAR from [facility]    Changes made to medication list:  Medications removed:  Cetirizine 10 mg daily (switched to loratadine)  Ciprofloxacin (not on medication list from ECF)  Escitalopram (discontinued 5/15/25 per ECF medication list)  Ondansetron PRN (not on medication list from ECF)    Medications added/doses adjusted:  Amlodipine changed from 10 mg daily to 5 mg BID  Duloxetine changed from 60 mg to 120 mg daily   Added loratadine 10 mg daily     Electronically signed by Davida Luis RPH on 5/31/2025 at 8:32 PM   
Pt was in bed and was alone at time of the visit. She was dealing with renal abscess. She was encouraged and blessed.    06/05/25 1604   Encounter Summary   Encounter Overview/Reason Spiritual/Emotional Needs   Service Provided For Patient   [facility]   Support System Family members   Last Encounter  06/05/25   Complexity of Encounter Low   Begin Time 1240   End Time  1246   Total Time Calculated 6 min   Spiritual/Emotional needs   Type Spiritual Support   Assessment/Intervention/Outcome   Assessment Hopeful   Intervention Empowerment        
Spoke with Peter in lab about adding triglyceride level to fluid cultures taken yesterday. Peter confirmed she will add it on and let this RN know if unable to do so.   
: No  Patient's  Info: Facility  Occupation: Retired       SUBJECTIVE: pt sitting in on STS with tech doing bathing.   Pt willing to do with therpay and tech ok wtihi therapy taking over.     PAIN: pt stated pain in groin area due to rash     Vitals: Vitals not assessed per clinical judgement, see nursing flowsheet    COGNITION: Decreased Problem Solving and Decreased Safety Awareness    ADL:   Bathing: Contact Guard Assistance.  Able to complete all areas   Upper Extremity Dressing: Minimal Assistance.  To tie gown   Lower Extremity Dressing: Contact Guard Assistance.  To don depends   Footwear Management: Contact Guard Assistance.  To don slipper socks   Toileting: Stand By Assistance.     Toilet Transfer: Stand By Assistance.   .    IADL:   Not Tested    BED MOBILITY:  Sit to Supine: Contact Guard Assistance      TRANSFERS:  Sit to Stand:  Contact Guard Assistance.    Stand to Sit: Contact Guard Assistance.      FUNCTIONAL MOBILITY:  Assistive Device: Rolling Walker  Assist Level:  Contact Guard Assistance.   Distance: To and from bathroom  No LOB      Modified Jose Roberto:  Current Functional Status:  Not Applicable    Education:  Learners: Patient  ADL's    ASSESSMENT:     Activity Tolerance:  Patient tolerance of  treatment: Good treatment tolerance      Plan: Times Per Week: 3-5x  Times Per Day: Once a day  Current Treatment Recommendations: Strengthening, Balance training, Functional mobility training, Endurance training, Neuromuscular re-education, Pain management, Self-Care / ADL, Positioning, Patient/Caregiver education & training, Safety education & training, Coordination training    Goals  Short Term Goals  Time Frame for Short Term Goals: by discharge  Short Term Goal 1: patient will tolerate 5 min functional standing with (S) to increase ease with toileting and grooming.  Short Term Goal 2: patient will functionally ambulate house hold distances with (S).  Short Term Goal 3: patient will 
Nightly    insulin lispro  0-8 Units SubCUTAneous 4x Daily AC & HS    latanoprost  1 drop Both Eyes Nightly    levothyroxine  75 mcg Oral Daily    levothyroxine  150 mcg Oral Weekly    trospium  20 mg Oral BID AC    pantoprazole  40 mg Oral BID AC    rOPINIRole  1 mg Oral Nightly    traZODone  200 mg Oral Nightly    amLODIPine  5 mg Oral BID      sodium chloride      dextrose       albuterol, oxyCODONE **OR** oxyCODONE, sodium chloride flush, sodium chloride, acetaminophen **OR** acetaminophen, glucose, dextrose bolus **OR** dextrose bolus, glucagon (rDNA), dextrose      LABS:     CBC:   Recent Labs     06/02/25  0546 06/04/25  0605   WBC 12.0* 6.1   HGB 9.6* 9.1*   * 394     BMP:    Recent Labs     06/02/25  0546 06/03/25  0641 06/04/25  0605   * 133* 133*   K 4.9 4.5 4.7   CL 97* 101 99   CO2 21* 22 24   BUN 12 10 12   CREATININE 0.7 0.8 0.7   GLUCOSE 127* 97 97     Calcium:  Recent Labs     06/04/25  0605   CALCIUM 10.1      Recent Labs     06/03/25  1923 06/04/25  0645 06/04/25  1057   POCGLU 125* 97 115*        CULTURES:   UA: No results for input(s): \"SPECGRAV\", \"PHUR\", \"COLORU\", \"CLARITYU\", \"MUCUS\", \"PROTEINU\", \"BLOODU\", \"RBCUA\", \"WBCUA\", \"BACTERIA\", \"NITRU\", \"GLUCOSEU\", \"BILIRUBINUR\", \"UROBILINOGEN\", \"KETUA\", \"LABCAST\", \"LABCASTTY\", \"AMORPHOS\" in the last 72 hours.    Invalid input(s): \"CRYSTALS\"  Micro:   Lab Results   Component Value Date/Time    BC  04/01/2025 02:50 AM     No growth 24 hours. No growth 48 hours. No growth at 5 days    BC  04/01/2025 02:50 AM     No growth 24 hours. No growth 48 hours. No growth at 5 days          Problem list of patient:     Patient Active Problem List   Diagnosis Code    GERD (gastroesophageal reflux disease) K21.9    Colon polyps K63.5    COPD (chronic obstructive pulmonary disease) ([facility]) J44.9    Bipolar 1 disorder ([facility]) F31.9    Chronic pain G89.29    Hiatal hernia K44.9    Hyponatremia E87.1    High anion gap metabolic acidosis E87.29    Primary 
**OR** acetaminophen, glucose, dextrose bolus **OR** dextrose bolus, glucagon (rDNA), dextrose    Exam:  /74   Pulse 61   Temp 97.8 °F (36.6 °C) (Oral)   Resp 16   Ht 1.626 m (5' 4\")   Wt 81.2 kg (179 lb 1.6 oz)   SpO2 96%   BMI 30.74 kg/m²   General: Chronically ill-appearing.  No distress, appears stated age.   Eyes:  PERRL. Conjunctivae/corneas clear.  HENT: Head normal appearing. Nares normal. Oral mucosa moist.  Hearing intact.   Neck: Supple, with full range of motion. Trachea midline.  No gross JVD appreciated.  Respiratory:  Normal effort. Clear to auscultation, without rales or wheezes or rhonchi.  Cardiovascular: Normal rate, regular rhythm with normal S1/S2 without murmurs.    No lower extremity edema.   Abdomen: Soft, left-sided tenderness.  Non-distended with normal bowel sounds.  Musculoskeletal: No joint swelling or tenderness. Normal tone. No abnormal movements.   Skin: Warm and dry. No rashes or lesions.  Neurologic:  No focal sensory/motor deficits in the upper or lower extremities. Cranial nerves:  grossly non-focal 2-12.     Psychiatric: Alert and oriented, normal insight and thought content.   Capillary Refill: Brisk,< 3 seconds.      Labs/Radiology: See chart or assessment above.     Electronically signed by Dayanara Garcia PA-C on 6/3/2025 at 4:36 PM    
   Type 2 diabetes mellitus with diabetic nephropathy, with long-term current use of insulin (HCC) E11.21, Z79.4         ASSESSMENT/PLAN   Recurrent fluid collection over the left nephrectomy bed  Body fluid cx is proteus which is sensitive to augmentin.   Follow up CT much improved.  There is no drainage.  Ok to remove the drain and discharge patient with oral augmentin    Nikos Virk MD, 6/7/2025 12:47 PM   
  Recurrent fluid collection over the left nephrectomy bed  Body fluid cx is proteus which is sensitive to augmentin. Will try to use oral antibiotic on discharge.    Nikos Virk MD, 6/5/2025 10:30 AM   
06/02/2025 05:46 AM    BUN 12 06/02/2025 05:46 AM    CREATININE 0.7 06/02/2025 05:46 AM    CALCIUM 9.4 06/02/2025 05:46 AM    GFRAA >60 02/25/2019 08:53 PM    LABGLOM > 90 06/02/2025 05:46 AM    LABGLOM 60 04/14/2025 08:06 AM       BA Tompkins - CNP, BA  06/02/25 12:41 PM  Urology     
AM    BUN 10 06/03/2025 06:41 AM    CREATININE 0.8 06/03/2025 06:41 AM    CALCIUM 9.7 06/03/2025 06:41 AM    GFRAA >60 02/25/2019 08:53 PM    LABGLOM 80 06/03/2025 06:41 AM    LABGLOM 60 04/14/2025 08:06 AM       BA Tompkins - CNP, BA  06/03/25 11:46 AM  Urology     
current use of insulin (HCC) E11.21, Z79.4         ASSESSMENT/PLAN   Recurrent fluid collection over the left nephrectomy bed  Body fluid cx is proteus which is sensitive to augmentin.   Consider follow up CT to evalaute the fluid collection prior to removal    Nikos Virk MD, 6/6/2025 10:22 AM   
time frame for expected duration of plan of care.  If no long-term goals established, a short length of stay is anticipated.    Goals:  Patient goals : return home at [facility]  Short Term Goals  Time Frame for Short Term Goals: by discharge  Short Term Goal 1: patient will tolerate 5 min functional standing with (S) to increase ease with toileting and grooming.  Short Term Goal 2: patient will functionally ambulate house hold distances with (S).  Short Term Goal 3: patient will complete ADL routine with (S) and edu in energy conservation tech to increase ADL success.  Short Term Goal 4: patient will demo >/= 4+/5 (B) UE strength to increase ease with functional transfers.    AM-[facility] Inpatient Daily Activity Raw Score: 19  AM-[facility] Inpatient ADL T-Scale Score : 40.22    Following session, patient left in safe position in chair, with alarm, and call light within reach               
  Electronically signed by Dr. Santiago Newman          Diet: ADULT DIET; Regular; 4 carb choices (60 gm/meal)    DVT prophylaxis: [] Lovenox                                 [] SCDs                                 [] SQ Heparin                                 [] Encourage ambulation           [] Already on Anticoagulation     Disposition:    [] Home       [] TCU       [] Rehab       [] Psych       [] SNF       [] Long Term Care Facility       [] Other-    Code Status: Full Code    PT/OT Eval Status: yes        Electronically signed by BA Gupta CNP on 6/4/2025 at 3:03 PM

## 2025-06-08 NOTE — DISCHARGE SUMMARY
Vomiting     oxyCODONE 5 MG immediate release tablet  Commonly known as: ROXICODONE     pantoprazole 40 MG tablet  Commonly known as: PROTONIX  Take 1 tablet by mouth 2 times daily (before meals)     pioglitazone 15 MG tablet  Commonly known as: ACTOS  Take 1 tablet by mouth daily     polyethylene glycol 17 g packet  Commonly known as: GLYCOLAX  Take 1 packet by mouth daily as needed for Constipation     pyridoxine 50 MG tablet  Commonly known as: B-6  Take 1 tablet by mouth daily     rOPINIRole 1 MG tablet  Commonly known as: REQUIP     traZODone 100 MG tablet  Commonly known as: DESYREL  Take 2 tablets by mouth nightly     vitamin D 50 MCG (2000 UT) Caps capsule  Commonly known as: CHOLECALCIFEROL  Take 1 capsule by mouth daily           * This list has 6 medication(s) that are the same as other medications prescribed for you. Read the directions carefully, and ask your doctor or other care provider to review them with you.                STOP taking these medications      ciprofloxacin 500 MG tablet  Commonly known as: CIPRO               Where to Get Your Medications        Information about where to get these medications is not yet available    Ask your nurse or doctor about these medications  amLODIPine 5 MG tablet  amoxicillin-clavulanate 875-125 MG per tablet  miconazole 2 % powder         Time Spent on discharge is more than 45 minutes in the examination, evaluation, counseling and review of medications and discharge plan.        Signed:    Thank you Marc De Oliveira MD for the opportunity to be involved in this patient's care.    Electronically signed by BA Gupta CNP on 6/8/2025 at 12:54 PM

## 2025-06-08 NOTE — PLAN OF CARE
Problem: Chronic Conditions and Co-morbidities  Goal: Patient's chronic conditions and co-morbidity symptoms are monitored and maintained or improved  6/5/2025 0959 by Connie Teixeira RN  Outcome: Progressing  6/5/2025 0303 by Zeb Garg RN  Outcome: Progressing  Flowsheets (Taken 6/2/2025 1039 by Loree Elkins RN)  Care Plan - Patient's Chronic Conditions and Co-Morbidity Symptoms are Monitored and Maintained or Improved:   Monitor and assess patient's chronic conditions and comorbid symptoms for stability, deterioration, or improvement   Collaborate with multidisciplinary team to address chronic and comorbid conditions and prevent exacerbation or deterioration   Update acute care plan with appropriate goals if chronic or comorbid symptoms are exacerbated and prevent overall improvement and discharge     Problem: Discharge Planning  Goal: Discharge to home or other facility with appropriate resources  6/5/2025 0959 by Connie Teixeira RN  Outcome: Progressing  6/5/2025 0303 by Zeb Garg RN  Outcome: Progressing  Flowsheets (Taken 6/2/2025 1039 by Loree Elkins RN)  Discharge to home or other facility with appropriate resources:   Identify barriers to discharge with patient and caregiver   Arrange for needed discharge resources and transportation as appropriate   Identify discharge learning needs (meds, wound care, etc)     Problem: Pain  Goal: Verbalizes/displays adequate comfort level or baseline comfort level  6/5/2025 0959 by Connie Teixeira RN  Outcome: Progressing  6/5/2025 0303 by Zeb Garg RN  Outcome: Progressing  Flowsheets (Taken 6/2/2025 1039 by Loree Elkins, RN)  Verbalizes/displays adequate comfort level or baseline comfort level:   Encourage patient to monitor pain and request assistance   Administer analgesics based on type and severity of pain and evaluate response     Problem: Safety - Adult  Goal: Free from fall injury  6/5/2025 
  Problem: Chronic Conditions and Co-morbidities  Goal: Patient's chronic conditions and co-morbidity symptoms are monitored and maintained or improved  6/6/2025 2311 by Radha Landaverde RN  Outcome: Progressing  Flowsheets (Taken 6/6/2025 2311)  Care Plan - Patient's Chronic Conditions and Co-Morbidity Symptoms are Monitored and Maintained or Improved: Monitor and assess patient's chronic conditions and comorbid symptoms for stability, deterioration, or improvement  6/6/2025 2304 by Radha Landaverde RN  Outcome: Progressing     Problem: Discharge Planning  Goal: Discharge to home or other facility with appropriate resources  6/6/2025 2311 by Radha Landaverde RN  Flowsheets (Taken 6/6/2025 2311)  Discharge to home or other facility with appropriate resources: Identify barriers to discharge with patient and caregiver  6/6/2025 2304 by Radha Landaverde RN  Outcome: Progressing  Note: Discharging back to facility tomorrow     Problem: Pain  Goal: Verbalizes/displays adequate comfort level or baseline comfort level  6/6/2025 2311 by Radha Landaverde RN  Flowsheets (Taken 6/6/2025 2311)  Verbalizes/displays adequate comfort level or baseline comfort level:   Implement non-pharmacological measures as appropriate and evaluate response   Assess pain using appropriate pain scale  6/6/2025 2304 by Radha Landaverde RN  Outcome: Progressing     Problem: Safety - Adult  Goal: Free from fall injury  6/6/2025 2311 by Radha Landaverde RN  Flowsheets (Taken 6/6/2025 2311)  Free From Fall Injury: Instruct family/caregiver on patient safety  6/6/2025 2304 by Radha Landaverde RN  Outcome: Progressing  Note: Patient remains free from falls or injury during current shift. Bedrails up x2, bed wheels locked, call light within reach and bed alarm on for high fall risk patients.      Problem: Discharge Planning  Goal: Discharge to home or other facility with appropriate resources  6/6/2025 2311 by Radha Landaverde RN  Flowsheets (Taken 6/6/2025 
  Problem: Chronic Conditions and Co-morbidities  Goal: Patient's chronic conditions and co-morbidity symptoms are monitored and maintained or improved  Outcome: Progressing  Flowsheets (Taken 5/31/2025 2250 by Carlos Decker RN)  Care Plan - Patient's Chronic Conditions and Co-Morbidity Symptoms are Monitored and Maintained or Improved:   Monitor and assess patient's chronic conditions and comorbid symptoms for stability, deterioration, or improvement   Collaborate with multidisciplinary team to address chronic and comorbid conditions and prevent exacerbation or deterioration   Update acute care plan with appropriate goals if chronic or comorbid symptoms are exacerbated and prevent overall improvement and discharge     Problem: Discharge Planning  Goal: Discharge to home or other facility with appropriate resources  Outcome: Progressing  Flowsheets (Taken 6/2/2025 0212)  Discharge to home or other facility with appropriate resources: Identify barriers to discharge with patient and caregiver     Problem: Pain  Goal: Verbalizes/displays adequate comfort level or baseline comfort level  Outcome: Progressing  Flowsheets (Taken 5/31/2025 2250 by Carlos Decker RN)  Verbalizes/displays adequate comfort level or baseline comfort level:   Encourage patient to monitor pain and request assistance   Assess pain using appropriate pain scale   Implement non-pharmacological measures as appropriate and evaluate response   Administer analgesics based on type and severity of pain and evaluate response   Consider cultural and social influences on pain and pain management   Notify Licensed Independent Practitioner if interventions unsuccessful or patient reports new pain     Problem: Safety - Adult  Goal: Free from fall injury  Outcome: Progressing  Flowsheets (Taken 6/2/2025 0212)  Free From Fall Injury: Instruct family/caregiver on patient safety     Problem: Genitourinary - Adult  Goal: Absence of urinary retention  Outcome: 
  Problem: Chronic Conditions and Co-morbidities  Goal: Patient's chronic conditions and co-morbidity symptoms are monitored and maintained or improved  Outcome: Progressing  Flowsheets (Taken 6/2/2025 1039 by Loree Elkins, RN)  Care Plan - Patient's Chronic Conditions and Co-Morbidity Symptoms are Monitored and Maintained or Improved:   Monitor and assess patient's chronic conditions and comorbid symptoms for stability, deterioration, or improvement   Collaborate with multidisciplinary team to address chronic and comorbid conditions and prevent exacerbation or deterioration   Update acute care plan with appropriate goals if chronic or comorbid symptoms are exacerbated and prevent overall improvement and discharge     Problem: Discharge Planning  Goal: Discharge to home or other facility with appropriate resources  Outcome: Progressing  Flowsheets (Taken 6/2/2025 1039 by Loree Elkins, RN)  Discharge to home or other facility with appropriate resources:   Identify barriers to discharge with patient and caregiver   Arrange for needed discharge resources and transportation as appropriate   Identify discharge learning needs (meds, wound care, etc)     Problem: Pain  Goal: Verbalizes/displays adequate comfort level or baseline comfort level  Outcome: Progressing  Flowsheets (Taken 6/2/2025 1039 by Loree Elkins, RN)  Verbalizes/displays adequate comfort level or baseline comfort level:   Encourage patient to monitor pain and request assistance   Administer analgesics based on type and severity of pain and evaluate response     Problem: Safety - Adult  Goal: Free from fall injury  Outcome: Progressing  Flowsheets (Taken 6/2/2025 1039 by Loree Elkins, RN)  Free From Fall Injury:   Instruct family/caregiver on patient safety   Based on caregiver fall risk screen, instruct family/caregiver to ask for assistance with transferring infant if caregiver noted to have fall risk factors     Problem: Genitourinary - 
  Problem: Chronic Conditions and Co-morbidities  Goal: Patient's chronic conditions and co-morbidity symptoms are monitored and maintained or improved  Outcome: Progressing  Flowsheets (Taken 6/2/2025 1039 by Loree Elkins, RN)  Care Plan - Patient's Chronic Conditions and Co-Morbidity Symptoms are Monitored and Maintained or Improved:   Monitor and assess patient's chronic conditions and comorbid symptoms for stability, deterioration, or improvement   Collaborate with multidisciplinary team to address chronic and comorbid conditions and prevent exacerbation or deterioration   Update acute care plan with appropriate goals if chronic or comorbid symptoms are exacerbated and prevent overall improvement and discharge     Problem: Discharge Planning  Goal: Discharge to home or other facility with appropriate resources  Outcome: Progressing  Flowsheets (Taken 6/2/2025 1039 by Loree Elkins, RN)  Discharge to home or other facility with appropriate resources:   Identify barriers to discharge with patient and caregiver   Arrange for needed discharge resources and transportation as appropriate   Identify discharge learning needs (meds, wound care, etc)     Problem: Pain  Goal: Verbalizes/displays adequate comfort level or baseline comfort level  Outcome: Progressing  Flowsheets (Taken 6/2/2025 1039 by Loree Elkins, RN)  Verbalizes/displays adequate comfort level or baseline comfort level:   Encourage patient to monitor pain and request assistance   Administer analgesics based on type and severity of pain and evaluate response     Problem: Safety - Adult  Goal: Free from fall injury  Outcome: Progressing  Flowsheets (Taken 6/2/2025 1039 by Loree Elkins, RN)  Free From Fall Injury:   Instruct family/caregiver on patient safety   Based on caregiver fall risk screen, instruct family/caregiver to ask for assistance with transferring infant if caregiver noted to have fall risk factors     Problem: Genitourinary - 
  Problem: Chronic Conditions and Co-morbidities  Goal: Patient's chronic conditions and co-morbidity symptoms are monitored and maintained or improved  Outcome: Progressing  Flowsheets (Taken 6/8/2025 0018)  Care Plan - Patient's Chronic Conditions and Co-Morbidity Symptoms are Monitored and Maintained or Improved:   Monitor and assess patient's chronic conditions and comorbid symptoms for stability, deterioration, or improvement   Collaborate with multidisciplinary team to address chronic and comorbid conditions and prevent exacerbation or deterioration   Update acute care plan with appropriate goals if chronic or comorbid symptoms are exacerbated and prevent overall improvement and discharge     Problem: Discharge Planning  Goal: Discharge to home or other facility with appropriate resources  6/8/2025 0018 by Carol Ann Darling, RN  Outcome: Progressing  Flowsheets (Taken 6/8/2025 0018)  Discharge to home or other facility with appropriate resources:   Identify barriers to discharge with patient and caregiver   Arrange for needed discharge resources and transportation as appropriate   Identify discharge learning needs (meds, wound care, etc)     Problem: Pain  Goal: Verbalizes/displays adequate comfort level or baseline comfort level  6/8/2025 0018 by Carol Ann Darling, RN  Outcome: Progressing  Flowsheets (Taken 6/8/2025 0018)  Verbalizes/displays adequate comfort level or baseline comfort level:   Encourage patient to monitor pain and request assistance   Assess pain using appropriate pain scale   Administer analgesics based on type and severity of pain and evaluate response   Implement non-pharmacological measures as appropriate and evaluate response     Problem: Safety - Adult  Goal: Free from fall injury  6/8/2025 0018 by Carol Ann Darling, RN  Outcome: Progressing  Flowsheets (Taken 6/8/2025 0018)  Free From Fall Injury:   Instruct family/caregiver on patient safety   Based on caregiver fall risk screen, instruct 
  Problem: Discharge Planning  Goal: Discharge to home or other facility with appropriate resources  6/7/2025 1248 by Delano Marquez  Outcome: Progressing  Note: Discharge back to [facility], social work following, family to transport  6/6/2025 2311 by Radha Landaverde, RN  Flowsheets (Taken 6/6/2025 2311)  Discharge to home or other facility with appropriate resources: Identify barriers to discharge with patient and caregiver  6/6/2025 2304 by Radha Landaverde RN  Outcome: Progressing  Note: Discharging back to facility tomorrow     Problem: Pain  Goal: Verbalizes/displays adequate comfort level or baseline comfort level  6/7/2025 1248 by Delano Marquez  Outcome: Progressing  Note: Roxicodone q4 PRN, encourage rest and reposition  6/6/2025 2311 by Radha Landaverde RN  Flowsheets (Taken 6/6/2025 2311)  Verbalizes/displays adequate comfort level or baseline comfort level:   Implement non-pharmacological measures as appropriate and evaluate response   Assess pain using appropriate pain scale  6/6/2025 2304 by Radha Landaverde RN  Outcome: Progressing     Problem: Safety - Adult  Goal: Free from fall injury  6/7/2025 1248 by Delano Marquez  Outcome: Progressing  Note: No falls noted this shift. Continue falling star program. Bed alarm on, bed in low position. Call light and personal belongings in reach.  Patient uses call light appropriately.    6/6/2025 2311 by Radha Landaverde RN  Flowsheets (Taken 6/6/2025 2311)  Free From Fall Injury: Instruct family/caregiver on patient safety  6/6/2025 2304 by Radha Landaverde RN  Outcome: Progressing  Note: Patient remains free from falls or injury during current shift. Bedrails up x2, bed wheels locked, call light within reach and bed alarm on for high fall risk patients.      Problem: Respiratory - Adult  Goal: Lung sounds clear or within normal limits for patient  6/7/2025 1248 by Delano Marquez  Outcome: Progressing  Note: Lung sounds clear, encourage cough and deep breathing, 
  Problem: Respiratory - Adult  Goal: Lung sounds clear or within normal limits for patient  6/2/2025 0910 by Mercedes Camara, RCABELARDO  Outcome: Progressing     
  Problem: Respiratory - Adult  Goal: Lung sounds clear or within normal limits for patient  6/3/2025 0815 by Dilan Welch RCP  Outcome: Progressing  6/3/2025 0102 by Zeb Garg RN  Outcome: Progressing     
  Problem: Respiratory - Adult  Goal: Lung sounds clear or within normal limits for patient  6/4/2025 2025 by Naomi Alcala RCP  Outcome: Progressing    
  Problem: Respiratory - Adult  Goal: Lung sounds clear or within normal limits for patient  6/5/2025 0813 by Teresa Brown RCP  Outcome: Progressing     
  Problem: Respiratory - Adult  Goal: Lung sounds clear or within normal limits for patient  6/6/2025 0807 by Plescher, Payton, RCP  Outcome: Progressing     
  Problem: Respiratory - Adult  Goal: Lung sounds clear or within normal limits for patient  6/8/2025 0823 by Teresa Brown RCP  Outcome: Progressing     
  Problem: Respiratory - Adult  Goal: Lung sounds clear or within normal limits for patient  Outcome: Progressing     
  Problem: Respiratory - Adult  Goal: Lung sounds clear or within normal limits for patient  Outcome: Progressing   Breath sounds clear, continuing inhalers as ordered.  Patient mutually agreed on goals.    
  Problem: Respiratory - Adult  Goal: Lung sounds clear or within normal limits for patient  Outcome: Progressing   Patient lung sounds are considered normal for their current lung condition. No signs of distress noted. Current treatment regimen appropriate   
Return to [facility]   
have fall risk factors     Problem: Genitourinary - Adult  Goal: Absence of urinary retention  6/2/2025 0212 by Luke Lyle RN  Outcome: Progressing  Flowsheets (Taken 6/2/2025 0212)  Absence of urinary retention: Assess patient’s ability to void and empty bladder     Problem: Genitourinary - Adult  Goal: Urinary catheter remains patent  6/2/2025 1039 by Loree Elkins RN  Outcome: Progressing  Flowsheets (Taken 6/2/2025 1039)  Urinary catheter remains patent: Assess patency of urinary catheter     Problem: Infection - Adult  Goal: Absence of infection at discharge  6/2/2025 1039 by Loree Elkins RN  Outcome: Progressing  Flowsheets (Taken 6/2/2025 1039)  Absence of infection at discharge: Assess and monitor for signs and symptoms of infection     Problem: Infection - Adult  Goal: Absence of infection during hospitalization  6/2/2025 1039 by Loree Elkins RN  Outcome: Progressing  Flowsheets (Taken 6/2/2025 1039)  Absence of infection during hospitalization: Assess and monitor for signs and symptoms of infection     Problem: Infection - Adult  Goal: Absence of fever/infection during anticipated neutropenic period  6/2/2025 0212 by Luke Lyle RN  Outcome: Progressing  Flowsheets (Taken 5/31/2025 2250 by Carlos Decker RN)  Absence of fever/infection during anticipated neutropenic period:   Monitor white blood cell count   Administer growth factors as ordered   Implement neutropenic guidelines     Problem: Respiratory - Adult  Goal: Lung sounds clear or within normal limits for patient  6/2/2025 1039 by Loree Elkins RN  Outcome: Progressing   Care plan reviewed with patient and family.  Patient and family verbalize understanding of the plan of care and contribute to goal setting.     
transferring infant if caregiver noted to have fall risk factors     Problem: Genitourinary - Adult  Goal: Absence of urinary retention  Outcome: Progressing  Flowsheets (Taken 6/2/2025 0212 by Luke Lyle, RN)  Absence of urinary retention: Assess patient’s ability to void and empty bladder  Goal: Urinary catheter remains patent  Outcome: Progressing  Flowsheets (Taken 6/2/2025 1039 by Loree Elkins, RN)  Urinary catheter remains patent: Assess patency of urinary catheter     Problem: Infection - Adult  Goal: Absence of infection at discharge  Outcome: Progressing  Flowsheets (Taken 6/2/2025 1039 by Loree Elkins, RN)  Absence of infection at discharge: Assess and monitor for signs and symptoms of infection  Goal: Absence of infection during hospitalization  Outcome: Progressing  Flowsheets (Taken 6/2/2025 1039 by Loree Elkins, RN)  Absence of infection during hospitalization: Assess and monitor for signs and symptoms of infection  Goal: Absence of fever/infection during anticipated neutropenic period  Outcome: Progressing  Flowsheets (Taken 5/31/2025 2250 by Carlos Decker, RN)  Absence of fever/infection during anticipated neutropenic period:   Monitor white blood cell count   Administer growth factors as ordered   Implement neutropenic guidelines     Problem: Respiratory - Adult  Goal: Lung sounds clear or within normal limits for patient  Outcome: Progressing     
instruct in appropriate isolation precautions for identified infection/condition  Goal: Absence of infection during hospitalization  Outcome: Progressing  Flowsheets (Taken 5/31/2025 2250)  Absence of infection during hospitalization:   Assess and monitor for signs and symptoms of infection   Monitor lab/diagnostic results   Monitor all insertion sites i.e., indwelling lines, tubes and drains   Monitor endotracheal (as able) and nasal secretions for changes in amount and color   [place] appropriate cooling/warming therapies per order   Administer medications as ordered   Instruct and encourage patient and family to use good hand hygiene technique   Identify and instruct in appropriate isolation precautions for identified infection/condition  Goal: Absence of fever/infection during anticipated neutropenic period  Outcome: Progressing  Flowsheets (Taken 5/31/2025 2250)  Absence of fever/infection during anticipated neutropenic period:   Monitor white blood cell count   Administer growth factors as ordered   Implement neutropenic guidelines  Care plan reviewed with patient.  Patient verbalizes understanding of the care plan and contributed to goal setting.

## 2025-06-10 ENCOUNTER — HOSPITAL ENCOUNTER (OUTPATIENT)
Dept: CT IMAGING | Age: 70
Discharge: HOME | End: 2025-06-10
Attending: INTERNAL MEDICINE
Payer: MEDICARE

## 2025-06-10 ENCOUNTER — RESULTS FOLLOW-UP (OUTPATIENT)
Age: 70
End: 2025-06-10

## 2025-06-10 DIAGNOSIS — Z87.891 PERSONAL HISTORY OF TOBACCO USE: ICD-10-CM

## 2025-06-10 PROCEDURE — 71271 CT THORAX LUNG CANCER SCR C-: CPT

## 2025-07-02 NOTE — PROGRESS NOTES
[facility]    Patient: PATRICK BUTCHER, 69 y.o.   : 1955  7/3/2025      Patient of Dr. Yates     Assessment/Plan      Diagnosis Orders   1. Chronic obstructive pulmonary disease, unspecified COPD type (HCC)  albuterol sulfate HFA (VENTOLIN HFA) 108 (90 Base) MCG/ACT inhaler    Budeson-Glycopyrrol-Formoterol (BREZTRI AEROSPHERE) 160-9-4.8 MCG/ACT AERO      2. Mixed obstructive and restrictive ventilatory defect  albuterol sulfate HFA (VENTOLIN HFA) 108 (90 Base) MCG/ACT inhaler    Budeson-Glycopyrrol-Formoterol (BREZTRI AEROSPHERE) 160-9-4.8 MCG/ACT AERO      3. Personal history of tobacco use  albuterol sulfate HFA (VENTOLIN HFA) 108 (90 Base) MCG/ACT inhaler    Budeson-Glycopyrrol-Formoterol (BREZTRI AEROSPHERE) 160-9-4.8 MCG/ACT AERO      4. Chronic bronchitis, unspecified chronic bronchitis type (HCC)  albuterol sulfate HFA (VENTOLIN HFA) 108 (90 Base) MCG/ACT inhaler    Budeson-Glycopyrrol-Formoterol (BREZTRI AEROSPHERE) 160-9-4.8 MCG/ACT AERO      5. Chronic cough  benzonatate (TESSALON) 200 MG capsule         -Discontinue Trelegy related to poor tolerance from patient  -Begin Breztri 2 puffs twice daily, rinse mouth after use  -Begin albuterol inhaler, previously unavailable to patient  -Encourage patient to add Singulair to her regimen to help with chronic cough, patient declines, can explore this option again at next visit  -Begin Tessalon Perles 200 mg twice daily as needed for chronic cough  -Continue pantoprazole, patient denies any GERD symptoms at this time  -Reviewed preventative vaccinations    Advised patient to call office with any changes, questions, or concerns regarding respiratory status or issues with prescribed medications    Return in about 3 months (around 10/3/2025) for chronic bronchitis, chronic cough after med change.     Subjective     Chief Complaint   Patient presents with    Follow-up     3 month COPD follow up CT Chest 2025

## 2025-07-03 ENCOUNTER — OFFICE VISIT (OUTPATIENT)
Age: 70
End: 2025-07-03
Payer: MEDICARE

## 2025-07-03 VITALS
OXYGEN SATURATION: 97 % | HEART RATE: 55 BPM | WEIGHT: 174 LBS | TEMPERATURE: 97.6 F | DIASTOLIC BLOOD PRESSURE: 68 MMHG | HEIGHT: 64 IN | BODY MASS INDEX: 29.71 KG/M2 | SYSTOLIC BLOOD PRESSURE: 112 MMHG

## 2025-07-03 DIAGNOSIS — Z87.891 PERSONAL HISTORY OF TOBACCO USE: ICD-10-CM

## 2025-07-03 DIAGNOSIS — R05.3 CHRONIC COUGH: ICD-10-CM

## 2025-07-03 DIAGNOSIS — J44.9 CHRONIC OBSTRUCTIVE PULMONARY DISEASE, UNSPECIFIED COPD TYPE (HCC): Primary | ICD-10-CM

## 2025-07-03 DIAGNOSIS — R94.2 MIXED OBSTRUCTIVE AND RESTRICTIVE VENTILATORY DEFECT: ICD-10-CM

## 2025-07-03 DIAGNOSIS — J42 CHRONIC BRONCHITIS, UNSPECIFIED CHRONIC BRONCHITIS TYPE (HCC): ICD-10-CM

## 2025-07-03 PROCEDURE — G8427 DOCREV CUR MEDS BY ELIG CLIN: HCPCS

## 2025-07-03 PROCEDURE — G8417 CALC BMI ABV UP PARAM F/U: HCPCS

## 2025-07-03 PROCEDURE — 3078F DIAST BP <80 MM HG: CPT

## 2025-07-03 PROCEDURE — 1159F MED LIST DOCD IN RCRD: CPT

## 2025-07-03 PROCEDURE — 1036F TOBACCO NON-USER: CPT

## 2025-07-03 PROCEDURE — 3074F SYST BP LT 130 MM HG: CPT

## 2025-07-03 PROCEDURE — 3017F COLORECTAL CA SCREEN DOC REV: CPT

## 2025-07-03 PROCEDURE — 99214 OFFICE O/P EST MOD 30 MIN: CPT

## 2025-07-03 PROCEDURE — G8400 PT W/DXA NO RESULTS DOC: HCPCS

## 2025-07-03 PROCEDURE — 1123F ACP DISCUSS/DSCN MKR DOCD: CPT

## 2025-07-03 PROCEDURE — 1090F PRES/ABSN URINE INCON ASSESS: CPT

## 2025-07-03 PROCEDURE — 1111F DSCHRG MED/CURRENT MED MERGE: CPT

## 2025-07-03 PROCEDURE — 3023F SPIROM DOC REV: CPT

## 2025-07-03 RX ORDER — ALBUTEROL SULFATE 90 UG/1
2 INHALANT RESPIRATORY (INHALATION) 4 TIMES DAILY PRN
Qty: 8 G | Refills: 11 | Status: SHIPPED | OUTPATIENT
Start: 2025-07-03

## 2025-07-03 RX ORDER — BUDESONIDE, GLYCOPYRROLATE, AND FORMOTEROL FUMARATE 160; 9; 4.8 UG/1; UG/1; UG/1
2 AEROSOL, METERED RESPIRATORY (INHALATION) 2 TIMES DAILY
Qty: 1 EACH | Refills: 3 | Status: SHIPPED | OUTPATIENT
Start: 2025-07-03

## 2025-07-03 RX ORDER — BENZONATATE 200 MG/1
200 CAPSULE ORAL 2 TIMES DAILY PRN
Qty: 90 CAPSULE | Refills: 0 | Status: SHIPPED | OUTPATIENT
Start: 2025-07-03 | End: 2025-08-17

## 2025-07-04 ASSESSMENT — ENCOUNTER SYMPTOMS
RHINORRHEA: 0
SINUS PRESSURE: 1
SHORTNESS OF BREATH: 1
TROUBLE SWALLOWING: 0
WHEEZING: 1
COUGH: 1
SORE THROAT: 1
CHEST TIGHTNESS: 0

## 2025-07-08 ENCOUNTER — TELEPHONE (OUTPATIENT)
Dept: PULMONOLOGY | Age: 70
End: 2025-07-08

## 2025-07-08 NOTE — TELEPHONE ENCOUNTER
Called to Elayne at [facility]. I read you're note to her. She voiced understanding. They will call us if they need us. Thank you

## 2025-07-08 NOTE — TELEPHONE ENCOUNTER
You mentioned in you're note she is to try Singular. She does not have a script for that. She is also taking Robitussin q 4 hrs. Should she be taking the Robitussin and the tesslon pearls? Please advise. Thank you

## 2025-07-10 DIAGNOSIS — R94.2 MIXED OBSTRUCTIVE AND RESTRICTIVE VENTILATORY DEFECT: ICD-10-CM

## 2025-07-10 DIAGNOSIS — J42 CHRONIC BRONCHITIS, UNSPECIFIED CHRONIC BRONCHITIS TYPE (HCC): ICD-10-CM

## 2025-07-10 DIAGNOSIS — J44.9 CHRONIC OBSTRUCTIVE PULMONARY DISEASE, UNSPECIFIED COPD TYPE (HCC): ICD-10-CM

## 2025-07-10 DIAGNOSIS — Z87.891 PERSONAL HISTORY OF TOBACCO USE: ICD-10-CM

## 2025-07-10 RX ORDER — ALBUTEROL SULFATE 90 UG/1
2 INHALANT RESPIRATORY (INHALATION) 4 TIMES DAILY PRN
Qty: 8 G | Refills: 11 | Status: SHIPPED | OUTPATIENT
Start: 2025-07-10

## 2025-07-10 NOTE — TELEPHONE ENCOUNTER
Obtain final culture results once resulted. Empirically start Omnicef 300 mg PO BID x 7 days. Treatment Goal Met?: No Treatment Goal Explanation (Does Not Render In The Note): Stable for the purposes of categorizing medical decision making is defined by the specific treatment goals for an individual patient. A patient that is not at their treatment goal is not stable, even if the condition has not changed and there is no short- term threat to life or function.

## 2025-08-05 ENCOUNTER — OFFICE VISIT (OUTPATIENT)
Dept: NEPHROLOGY | Age: 70
End: 2025-08-05
Payer: MEDICARE

## 2025-08-05 VITALS
HEART RATE: 69 BPM | SYSTOLIC BLOOD PRESSURE: 130 MMHG | DIASTOLIC BLOOD PRESSURE: 64 MMHG | BODY MASS INDEX: 31.1 KG/M2 | HEIGHT: 64 IN | WEIGHT: 182.2 LBS | OXYGEN SATURATION: 97 %

## 2025-08-05 DIAGNOSIS — Z90.5 HISTORY OF LEFT NEPHRECTOMY: ICD-10-CM

## 2025-08-05 DIAGNOSIS — N39.490 URINARY INCONTINENCE, OVERFLOW: ICD-10-CM

## 2025-08-05 DIAGNOSIS — M54.50 LEFT-SIDED LOW BACK PAIN WITHOUT SCIATICA, UNSPECIFIED CHRONICITY: ICD-10-CM

## 2025-08-05 DIAGNOSIS — E11.21 DIABETIC NEPHROPATHY ASSOCIATED WITH TYPE 2 DIABETES MELLITUS (HCC): ICD-10-CM

## 2025-08-05 DIAGNOSIS — I10 PRIMARY HYPERTENSION: ICD-10-CM

## 2025-08-05 DIAGNOSIS — N18.2 CKD (CHRONIC KIDNEY DISEASE), STAGE II: Primary | ICD-10-CM

## 2025-08-05 PROCEDURE — G8427 DOCREV CUR MEDS BY ELIG CLIN: HCPCS | Performed by: INTERNAL MEDICINE

## 2025-08-05 PROCEDURE — 1123F ACP DISCUSS/DSCN MKR DOCD: CPT | Performed by: INTERNAL MEDICINE

## 2025-08-05 PROCEDURE — 0509F URINE INCON PLAN DOCD: CPT | Performed by: INTERNAL MEDICINE

## 2025-08-05 PROCEDURE — 1036F TOBACCO NON-USER: CPT | Performed by: INTERNAL MEDICINE

## 2025-08-05 PROCEDURE — 2022F DILAT RTA XM EVC RTNOPTHY: CPT | Performed by: INTERNAL MEDICINE

## 2025-08-05 PROCEDURE — G8417 CALC BMI ABV UP PARAM F/U: HCPCS | Performed by: INTERNAL MEDICINE

## 2025-08-05 PROCEDURE — 99214 OFFICE O/P EST MOD 30 MIN: CPT | Performed by: INTERNAL MEDICINE

## 2025-08-05 PROCEDURE — G8400 PT W/DXA NO RESULTS DOC: HCPCS | Performed by: INTERNAL MEDICINE

## 2025-08-05 PROCEDURE — 3078F DIAST BP <80 MM HG: CPT | Performed by: INTERNAL MEDICINE

## 2025-08-05 PROCEDURE — 3075F SYST BP GE 130 - 139MM HG: CPT | Performed by: INTERNAL MEDICINE

## 2025-08-05 PROCEDURE — 3046F HEMOGLOBIN A1C LEVEL >9.0%: CPT | Performed by: INTERNAL MEDICINE

## 2025-08-05 PROCEDURE — 3017F COLORECTAL CA SCREEN DOC REV: CPT | Performed by: INTERNAL MEDICINE

## 2025-08-05 PROCEDURE — 1159F MED LIST DOCD IN RCRD: CPT | Performed by: INTERNAL MEDICINE

## 2025-08-05 PROCEDURE — 1090F PRES/ABSN URINE INCON ASSESS: CPT | Performed by: INTERNAL MEDICINE

## 2025-08-07 ENCOUNTER — HOSPITAL ENCOUNTER (OUTPATIENT)
Age: 70
Discharge: HOME | End: 2025-08-07
Payer: MEDICARE

## 2025-08-07 ENCOUNTER — HOSPITAL ENCOUNTER (OUTPATIENT)
Dept: GENERAL RADIOLOGY | Age: 70
Discharge: HOME | End: 2025-08-07
Payer: MEDICARE

## 2025-08-07 DIAGNOSIS — N18.2 CKD (CHRONIC KIDNEY DISEASE), STAGE II: ICD-10-CM

## 2025-08-07 DIAGNOSIS — M54.50 LEFT-SIDED LOW BACK PAIN WITHOUT SCIATICA, UNSPECIFIED CHRONICITY: ICD-10-CM

## 2025-08-07 LAB
ANION GAP SERPL CALC-SCNC: 11 MEQ/L (ref 8–16)
BUN SERPL-MCNC: 16 MG/DL (ref 8–23)
CALCIUM SERPL-MCNC: 10.3 MG/DL (ref 8.8–10.2)
CHLORIDE SERPL-SCNC: 99 MEQ/L (ref 98–111)
CO2 SERPL-SCNC: 25 MEQ/L (ref 22–29)
CREAT SERPL-MCNC: 1 MG/DL (ref 0.5–0.9)
GFR SERPL CREATININE-BSD FRML MDRD: 61 ML/MIN/1.73M2
GLUCOSE SERPL-MCNC: 142 MG/DL (ref 74–109)
POTASSIUM SERPL-SCNC: 4.4 MEQ/L (ref 3.5–5.2)
SODIUM SERPL-SCNC: 135 MEQ/L (ref 135–145)

## 2025-08-07 PROCEDURE — 36415 COLL VENOUS BLD VENIPUNCTURE: CPT

## 2025-08-07 PROCEDURE — 80048 BASIC METABOLIC PNL TOTAL CA: CPT

## 2025-08-07 PROCEDURE — 72100 X-RAY EXAM L-S SPINE 2/3 VWS: CPT

## 2025-08-13 ENCOUNTER — HOSPITAL ENCOUNTER (OUTPATIENT)
Dept: ULTRASOUND IMAGING | Age: 70
Discharge: HOME | End: 2025-08-13
Attending: INTERNAL MEDICINE
Payer: MEDICARE

## 2025-08-13 ENCOUNTER — OFFICE VISIT (OUTPATIENT)
Dept: ENT CLINIC | Age: 70
End: 2025-08-13
Payer: MEDICARE

## 2025-08-13 VITALS
HEART RATE: 80 BPM | OXYGEN SATURATION: 96 % | HEIGHT: 64 IN | WEIGHT: 185.7 LBS | DIASTOLIC BLOOD PRESSURE: 82 MMHG | SYSTOLIC BLOOD PRESSURE: 142 MMHG | RESPIRATION RATE: 18 BRPM | BODY MASS INDEX: 31.7 KG/M2 | TEMPERATURE: 97.8 F

## 2025-08-13 DIAGNOSIS — H65.22 LEFT CHRONIC SEROUS OTITIS MEDIA: Primary | ICD-10-CM

## 2025-08-13 DIAGNOSIS — R60.0 SUBMANDIBULAR GLAND SWELLING: ICD-10-CM

## 2025-08-13 DIAGNOSIS — H91.92 DECREASED HEARING OF LEFT EAR: ICD-10-CM

## 2025-08-13 DIAGNOSIS — J34.2 NASAL SEPTAL DEVIATION: ICD-10-CM

## 2025-08-13 DIAGNOSIS — N39.490 URINARY INCONTINENCE, OVERFLOW: ICD-10-CM

## 2025-08-13 DIAGNOSIS — H93.12 TINNITUS OF LEFT EAR: ICD-10-CM

## 2025-08-13 PROCEDURE — 1036F TOBACCO NON-USER: CPT | Performed by: REGISTERED NURSE

## 2025-08-13 PROCEDURE — G8400 PT W/DXA NO RESULTS DOC: HCPCS | Performed by: REGISTERED NURSE

## 2025-08-13 PROCEDURE — 1159F MED LIST DOCD IN RCRD: CPT | Performed by: REGISTERED NURSE

## 2025-08-13 PROCEDURE — 3079F DIAST BP 80-89 MM HG: CPT | Performed by: REGISTERED NURSE

## 2025-08-13 PROCEDURE — G8417 CALC BMI ABV UP PARAM F/U: HCPCS | Performed by: REGISTERED NURSE

## 2025-08-13 PROCEDURE — 1090F PRES/ABSN URINE INCON ASSESS: CPT | Performed by: REGISTERED NURSE

## 2025-08-13 PROCEDURE — 1123F ACP DISCUSS/DSCN MKR DOCD: CPT | Performed by: REGISTERED NURSE

## 2025-08-13 PROCEDURE — 76770 US EXAM ABDO BACK WALL COMP: CPT

## 2025-08-13 PROCEDURE — 1160F RVW MEDS BY RX/DR IN RCRD: CPT | Performed by: REGISTERED NURSE

## 2025-08-13 PROCEDURE — 3017F COLORECTAL CA SCREEN DOC REV: CPT | Performed by: REGISTERED NURSE

## 2025-08-13 PROCEDURE — G8427 DOCREV CUR MEDS BY ELIG CLIN: HCPCS | Performed by: REGISTERED NURSE

## 2025-08-13 PROCEDURE — 3077F SYST BP >= 140 MM HG: CPT | Performed by: REGISTERED NURSE

## 2025-08-13 PROCEDURE — 99204 OFFICE O/P NEW MOD 45 MIN: CPT | Performed by: REGISTERED NURSE

## 2025-08-13 RX ORDER — FLUTICASONE PROPIONATE 50 MCG
1 SPRAY, SUSPENSION (ML) NASAL DAILY
Qty: 16 G | Refills: 2 | Status: SHIPPED | OUTPATIENT
Start: 2025-08-13

## 2025-08-16 ENCOUNTER — HOSPITAL ENCOUNTER (EMERGENCY)
Age: 70
Discharge: HOME | End: 2025-08-17
Attending: STUDENT IN AN ORGANIZED HEALTH CARE EDUCATION/TRAINING PROGRAM
Payer: MEDICARE

## 2025-08-16 ENCOUNTER — APPOINTMENT (OUTPATIENT)
Dept: GENERAL RADIOLOGY | Age: 70
End: 2025-08-16
Payer: MEDICARE

## 2025-08-16 VITALS
DIASTOLIC BLOOD PRESSURE: 72 MMHG | RESPIRATION RATE: 19 BRPM | BODY MASS INDEX: 29.19 KG/M2 | HEIGHT: 64 IN | OXYGEN SATURATION: 99 % | HEART RATE: 65 BPM | TEMPERATURE: 97.8 F | SYSTOLIC BLOOD PRESSURE: 158 MMHG | WEIGHT: 171 LBS

## 2025-08-16 DIAGNOSIS — M25.572 LEFT ANKLE PAIN, UNSPECIFIED CHRONICITY: ICD-10-CM

## 2025-08-16 DIAGNOSIS — M25.561 RIGHT KNEE PAIN, UNSPECIFIED CHRONICITY: Primary | ICD-10-CM

## 2025-08-16 PROCEDURE — 96372 THER/PROPH/DIAG INJ SC/IM: CPT

## 2025-08-16 PROCEDURE — 6370000000 HC RX 637 (ALT 250 FOR IP)

## 2025-08-16 PROCEDURE — 99284 EMERGENCY DEPT VISIT MOD MDM: CPT

## 2025-08-16 PROCEDURE — 73610 X-RAY EXAM OF ANKLE: CPT

## 2025-08-16 PROCEDURE — 6360000002 HC RX W HCPCS

## 2025-08-16 PROCEDURE — 73564 X-RAY EXAM KNEE 4 OR MORE: CPT

## 2025-08-16 RX ORDER — OXYCODONE AND ACETAMINOPHEN 5; 325 MG/1; MG/1
1 TABLET ORAL ONCE
Refills: 0 | Status: COMPLETED | OUTPATIENT
Start: 2025-08-16 | End: 2025-08-16

## 2025-08-16 RX ORDER — ORPHENADRINE CITRATE 30 MG/ML
60 INJECTION INTRAMUSCULAR; INTRAVENOUS ONCE
Status: COMPLETED | OUTPATIENT
Start: 2025-08-16 | End: 2025-08-16

## 2025-08-16 RX ADMIN — ORPHENADRINE CITRATE 60 MG: 60 INJECTION INTRAMUSCULAR; INTRAVENOUS at 22:54

## 2025-08-16 RX ADMIN — OXYCODONE AND ACETAMINOPHEN 1 TABLET: 5; 325 TABLET ORAL at 21:06

## 2025-08-16 ASSESSMENT — PAIN SCALES - GENERAL: PAINLEVEL_OUTOF10: 9

## 2025-08-16 ASSESSMENT — PAIN - FUNCTIONAL ASSESSMENT: PAIN_FUNCTIONAL_ASSESSMENT: 0-10

## 2025-08-18 ENCOUNTER — OFFICE VISIT (OUTPATIENT)
Dept: NEPHROLOGY | Age: 70
End: 2025-08-18
Payer: MEDICARE

## 2025-08-18 VITALS
HEIGHT: 64 IN | BODY MASS INDEX: 31.69 KG/M2 | SYSTOLIC BLOOD PRESSURE: 136 MMHG | DIASTOLIC BLOOD PRESSURE: 68 MMHG | HEART RATE: 83 BPM | WEIGHT: 185.6 LBS | OXYGEN SATURATION: 97 %

## 2025-08-18 DIAGNOSIS — I10 PRIMARY HYPERTENSION: Chronic | ICD-10-CM

## 2025-08-18 DIAGNOSIS — Z90.5 HISTORY OF LEFT NEPHRECTOMY: ICD-10-CM

## 2025-08-18 DIAGNOSIS — E11.21 DIABETIC NEPHROPATHY ASSOCIATED WITH TYPE 2 DIABETES MELLITUS (HCC): ICD-10-CM

## 2025-08-18 DIAGNOSIS — M15.0 PRIMARY OSTEOARTHRITIS INVOLVING MULTIPLE JOINTS: ICD-10-CM

## 2025-08-18 DIAGNOSIS — M54.50 LEFT-SIDED LOW BACK PAIN WITHOUT SCIATICA, UNSPECIFIED CHRONICITY: Primary | ICD-10-CM

## 2025-08-18 PROCEDURE — 3078F DIAST BP <80 MM HG: CPT | Performed by: INTERNAL MEDICINE

## 2025-08-18 PROCEDURE — 3046F HEMOGLOBIN A1C LEVEL >9.0%: CPT | Performed by: INTERNAL MEDICINE

## 2025-08-18 PROCEDURE — 2022F DILAT RTA XM EVC RTNOPTHY: CPT | Performed by: INTERNAL MEDICINE

## 2025-08-18 PROCEDURE — 3075F SYST BP GE 130 - 139MM HG: CPT | Performed by: INTERNAL MEDICINE

## 2025-08-18 PROCEDURE — 3017F COLORECTAL CA SCREEN DOC REV: CPT | Performed by: INTERNAL MEDICINE

## 2025-08-18 PROCEDURE — 1090F PRES/ABSN URINE INCON ASSESS: CPT | Performed by: INTERNAL MEDICINE

## 2025-08-18 PROCEDURE — G8428 CUR MEDS NOT DOCUMENT: HCPCS | Performed by: INTERNAL MEDICINE

## 2025-08-18 PROCEDURE — 1036F TOBACCO NON-USER: CPT | Performed by: INTERNAL MEDICINE

## 2025-08-18 PROCEDURE — G8417 CALC BMI ABV UP PARAM F/U: HCPCS | Performed by: INTERNAL MEDICINE

## 2025-08-18 PROCEDURE — G8400 PT W/DXA NO RESULTS DOC: HCPCS | Performed by: INTERNAL MEDICINE

## 2025-08-18 PROCEDURE — 99213 OFFICE O/P EST LOW 20 MIN: CPT | Performed by: INTERNAL MEDICINE

## 2025-08-18 PROCEDURE — 1123F ACP DISCUSS/DSCN MKR DOCD: CPT | Performed by: INTERNAL MEDICINE

## 2025-08-21 ENCOUNTER — HOSPITAL ENCOUNTER (OUTPATIENT)
Dept: ULTRASOUND IMAGING | Age: 70
Discharge: HOME | End: 2025-08-21
Attending: REGISTERED NURSE
Payer: MEDICARE

## 2025-08-21 DIAGNOSIS — R60.0 SUBMANDIBULAR GLAND SWELLING: ICD-10-CM

## 2025-08-21 PROCEDURE — 76536 US EXAM OF HEAD AND NECK: CPT

## (undated) DEVICE — DUAL LUMEN URETERAL CATHETER

## (undated) DEVICE — KIT,ANTI FOG,W/SPONGE & FLUID,SOFT PACK: Brand: MEDLINE

## (undated) DEVICE — BAG DRNGE (SEE COMMENT) UROLOGY TBL 15.5X31 IN W/HOSE

## (undated) DEVICE — NEUROSTIMULATOR EXT SM LTWT SGL BTTN H2O RESIST WIRELESS

## (undated) DEVICE — CYSTO: Brand: MEDLINE INDUSTRIES, INC.

## (undated) DEVICE — BREAST HERNIA PACK: Brand: MEDLINE INDUSTRIES, INC.

## (undated) DEVICE — Z DISCONTINUED BY MEDLINE USE 2711682 TRAY SKIN PREP DRY W/ PREM GLV

## (undated) DEVICE — Device: Brand: SINGLE USE INJECTOR NM-221C-0427

## (undated) DEVICE — SYRINGE MED 10ML LUERLOCK TIP W/O SFTY DISP

## (undated) DEVICE — SOLUTION SCRB 4OZ 10% PVP I POVIDONE IOD TOP PAINT EXIDINE

## (undated) DEVICE — CYSTO PACK: Brand: MEDLINE INDUSTRIES, INC.

## (undated) DEVICE — PROVE COVER: Brand: UNBRANDED

## (undated) DEVICE — BANDAGE ADH W1XL3IN NAT FAB WVN FLX DURABLE N ADH PD SEAL

## (undated) DEVICE — GLOVE ORANGE PI 7   MSG9070

## (undated) DEVICE — GLOVE SURG SZ 6 THK91MIL LTX FREE SYN POLYISOPRENE ANTI

## (undated) DEVICE — ELECTRODE PT RET AD L9FT HI MOIST COND ADH HYDRGEL CORDED

## (undated) DEVICE — Z DISCONTINUED USE 2218611 SUTURE PROL PS-1 PRECIS PT RVS CUT 3/8 CIR 24MM 18 IN SZ 3-0

## (undated) DEVICE — SOLUTION IV IRRIG WATER 1000ML POUR BRL 2F7114

## (undated) DEVICE — PREP SOL PVP IODINE 4%  4 OZ/BTL

## (undated) DEVICE — GAUZE,SPONGE,3"X3",12PLY,STERILE,LF,2'S: Brand: MEDLINE

## (undated) DEVICE — Z INACTIVE USE 2660664 SOLUTION IRRIG 3000ML 0.9% SOD CHL USP UROMATIC PLAS CONT

## (undated) DEVICE — DISPOSABLE NEEDLE: Brand: DISPOSABLE NEEDLE

## (undated) DEVICE — TUBING, SUCTION, 1/4" X 20', STRAIGHT: Brand: MEDLINE INDUSTRIES, INC.

## (undated) DEVICE — CODMAN® SURGICAL PATTIES 1/2" X 3" (1.27CM X 7.62CM): Brand: CODMAN®

## (undated) DEVICE — GOWN,SIRUS,NON REINFRCD,LARGE,SET IN SL: Brand: MEDLINE

## (undated) DEVICE — SINU FOAM: Brand: SINU-FOAM

## (undated) DEVICE — SOLUTION SURG PREP POV IOD 7.5% 4 OZ

## (undated) DEVICE — SOLUTION IRRIG 3000ML 0.9% SOD CHL USP UROMATIC PLAS CONT

## (undated) DEVICE — 1010 S-DRAPE TOWEL DRAPE 10/BX: Brand: STERI-DRAPE™

## (undated) DEVICE — CLEANER,CAUTERY TIP,2X2",STERILE: Brand: MEDLINE

## (undated) DEVICE — GAUZE,SPONGE,8"X4",12PLY,XRAY,STRL,LF: Brand: MEDLINE

## (undated) DEVICE — SINGLE-USE DIGITAL FLEXIBLE URETEROSCOPE: Brand: LITHOVUE

## (undated) DEVICE — COVER US PRB W5XL96IN LTX W/ GEL

## (undated) DEVICE — Z INACTIVE USE 2735373 APPLICATOR FBR LAIN COT WOOD TIP ECONOMICAL

## (undated) DEVICE — PROGRAMMER PATIENT SMART INTERSTIM

## (undated) DEVICE — MICRO TIP WIPE: Brand: DEVON

## (undated) DEVICE — SOLUTION SCRB 4OZ 4% CHG H2O AIDED FOR PREOPERATIVE SKIN

## (undated) DEVICE — DRESSING TRNSPAR W5XL4.5IN FLM SHT SEMIPERMEABLE WIND

## (undated) DEVICE — BAG,BANDED,W/RUBBERBAND,STERILE,30X36: Brand: MEDLINE

## (undated) DEVICE — ANTI-FOG SOLUTION WITH FOAM PAD: Brand: DEVON

## (undated) DEVICE — COVER ARMBRD W13XL28.5IN IMPERV BLU FOR OP RM

## (undated) DEVICE — SPONGE GZ W4XL4IN COT 12 PLY TYP VII WVN C FLD DSGN

## (undated) DEVICE — BOTOX INJ 100U TYPE-A  (ORDERED AS NEEDED)

## (undated) DEVICE — Y-TYPE TUR/BLADDER IRRIGATION SET, REGULATING CLAMP

## (undated) DEVICE — GLOVE ORANGE PI 7 1/2   MSG9075

## (undated) DEVICE — C-ARM: Brand: UNBRANDED

## (undated) DEVICE — C-ARMOR C-ARM EQUIPMENT COVERS CLEAR STERILE UNIVERSAL FIT 12 PER CASE: Brand: C-ARMOR

## (undated) DEVICE — COVER,MAYO STAND,STERILE: Brand: MEDLINE

## (undated) DEVICE — SUTURE VCRL + SZ 3-0 L27IN ABSRB UD L26MM SH 1/2 CIR VCP416H

## (undated) DEVICE — HF-RESECTION ELECTRODE PLASMABUTTON BUTTON, 24 FR., 12°-30°, ESG TURIS: Brand: OLYMPUS

## (undated) DEVICE — CATHETER URETH 24FR BLLN 30CC STD LTX 3 W TWO OPP DRNGE EYE

## (undated) DEVICE — GLOVE SURG SZ 65 THK91MIL LTX FREE SYN POLYISOPRENE

## (undated) DEVICE — SYRINGE CATH TIP 50ML

## (undated) DEVICE — SYRINGE MED 10ML TRNSLUC BRL PLUNG BLK MRK POLYPR CTRL

## (undated) DEVICE — PAD,NON-ADHERENT,3X8,STERILE,LF,1/PK: Brand: MEDLINE

## (undated) DEVICE — Device

## (undated) DEVICE — SYRINGE MED 50ML LUERLOCK TIP

## (undated) DEVICE — PENCIL SMK EVAC ALL IN 1 DSGN ENH VISIBILITY IMPROVED AIR

## (undated) DEVICE — 4-PORT MANIFOLD: Brand: NEPTUNE 2

## (undated) DEVICE — SINGLE ACTION PUMPING SYSTEM

## (undated) DEVICE — SUTURE MCRYL + SZ 3-0 L27IN ABSRB UD PS1 L24MM 3/8 CIR REV MCP936H

## (undated) DEVICE — GUIDEWIRE URO L150CM DIA0.035IN STIFF NIT HYDRPHLC STR TIP

## (undated) DEVICE — ADHESIVE SKIN CLSR 0.7ML TOP DERMBND ADV

## (undated) DEVICE — DRAINBAG,ANTI-REFLUX TOWER,L/F,2000ML,LL: Brand: MEDLINE

## (undated) DEVICE — AGENT HEMSTAT 3GM PURIFIED PLNT STARCH PWD ABSRB ARISTA AH

## (undated) DEVICE — CONMED DISPOSABLE BIPOLAR CABLE, 10' (3.05M): Brand: CONMED

## (undated) DEVICE — ADAPTER URO SCP UROLOK LL

## (undated) DEVICE — SKIN AFFIX SURG ADHESIVE 72/CS 0.55ML: Brand: MEDLINE